# Patient Record
Sex: FEMALE | Race: WHITE | NOT HISPANIC OR LATINO | Employment: OTHER | URBAN - METROPOLITAN AREA
[De-identification: names, ages, dates, MRNs, and addresses within clinical notes are randomized per-mention and may not be internally consistent; named-entity substitution may affect disease eponyms.]

---

## 2017-01-03 ENCOUNTER — HOSPITAL ENCOUNTER (OUTPATIENT)
Dept: MAMMOGRAPHY | Facility: HOSPITAL | Age: 57
Discharge: HOME | End: 2017-01-03
Attending: INTERNAL MEDICINE

## 2017-01-03 DIAGNOSIS — Z12.31 OTHER SCREENING MAMMOGRAM: ICD-10-CM

## 2017-01-09 ENCOUNTER — COMMUNICATION - HEALTHEAST (OUTPATIENT)
Dept: INTERNAL MEDICINE | Facility: CLINIC | Age: 57
End: 2017-01-09

## 2017-01-19 ENCOUNTER — OFFICE VISIT - HEALTHEAST (OUTPATIENT)
Dept: BEHAVIORAL HEALTH | Facility: CLINIC | Age: 57
End: 2017-01-19

## 2017-01-19 DIAGNOSIS — F41.1 GENERALIZED ANXIETY DISORDER: ICD-10-CM

## 2017-01-19 DIAGNOSIS — F60.3 BORDERLINE PERSONALITY DISORDER (H): ICD-10-CM

## 2017-01-19 DIAGNOSIS — F33.1 DEPRESSION, MAJOR, RECURRENT, MODERATE (H): ICD-10-CM

## 2017-01-19 DIAGNOSIS — F43.12 CHRONIC POST-TRAUMATIC STRESS DISORDER (PTSD): ICD-10-CM

## 2017-01-23 ENCOUNTER — COMMUNICATION - HEALTHEAST (OUTPATIENT)
Dept: INTERNAL MEDICINE | Facility: CLINIC | Age: 57
End: 2017-01-23

## 2017-02-13 ENCOUNTER — COMMUNICATION - HEALTHEAST (OUTPATIENT)
Dept: INTERNAL MEDICINE | Facility: CLINIC | Age: 57
End: 2017-02-13

## 2017-02-13 DIAGNOSIS — S32.000A LUMBAR COMPRESSION FRACTURE, CLOSED, INITIAL ENCOUNTER (H): ICD-10-CM

## 2017-02-20 ENCOUNTER — COMMUNICATION - HEALTHEAST (OUTPATIENT)
Dept: INTERNAL MEDICINE | Facility: CLINIC | Age: 57
End: 2017-02-20

## 2017-02-23 ENCOUNTER — OFFICE VISIT - HEALTHEAST (OUTPATIENT)
Dept: BEHAVIORAL HEALTH | Facility: CLINIC | Age: 57
End: 2017-02-23

## 2017-02-23 DIAGNOSIS — F41.1 GENERALIZED ANXIETY DISORDER: ICD-10-CM

## 2017-02-23 DIAGNOSIS — F43.12 CHRONIC POST-TRAUMATIC STRESS DISORDER (PTSD): ICD-10-CM

## 2017-02-23 DIAGNOSIS — F60.3 BORDERLINE PERSONALITY DISORDER (H): ICD-10-CM

## 2017-02-23 DIAGNOSIS — F33.1 DEPRESSION, MAJOR, RECURRENT, MODERATE (H): ICD-10-CM

## 2017-02-27 ENCOUNTER — COMMUNICATION - HEALTHEAST (OUTPATIENT)
Dept: INTERNAL MEDICINE | Facility: CLINIC | Age: 57
End: 2017-02-27

## 2017-02-28 ENCOUNTER — COMMUNICATION - HEALTHEAST (OUTPATIENT)
Dept: BEHAVIORAL HEALTH | Facility: CLINIC | Age: 57
End: 2017-02-28

## 2017-02-28 ENCOUNTER — OFFICE VISIT - HEALTHEAST (OUTPATIENT)
Dept: BEHAVIORAL HEALTH | Facility: CLINIC | Age: 57
End: 2017-02-28

## 2017-02-28 DIAGNOSIS — F60.3 BORDERLINE PERSONALITY DISORDER (H): ICD-10-CM

## 2017-02-28 DIAGNOSIS — F41.9 ANXIETY DISORDER, UNSPECIFIED TYPE: ICD-10-CM

## 2017-02-28 ASSESSMENT — MIFFLIN-ST. JEOR: SCORE: 1952.08

## 2017-03-01 ENCOUNTER — OFFICE VISIT - HEALTHEAST (OUTPATIENT)
Dept: INTERNAL MEDICINE | Facility: CLINIC | Age: 57
End: 2017-03-01

## 2017-03-01 DIAGNOSIS — E78.2 MIXED HYPERLIPIDEMIA: ICD-10-CM

## 2017-03-01 DIAGNOSIS — E03.4 HYPOTHYROIDISM DUE TO ACQUIRED ATROPHY OF THYROID: ICD-10-CM

## 2017-03-01 DIAGNOSIS — N18.3 CHRONIC KIDNEY DISEASE (CKD), STAGE 3 (MODERATE): ICD-10-CM

## 2017-03-01 DIAGNOSIS — F41.9 ANXIETY DISORDER, UNSPECIFIED TYPE: ICD-10-CM

## 2017-03-01 DIAGNOSIS — G40.909 NONINTRACTABLE EPILEPSY WITHOUT STATUS EPILEPTICUS, UNSPECIFIED EPILEPSY TYPE (H): ICD-10-CM

## 2017-03-01 DIAGNOSIS — I10 ESSENTIAL HYPERTENSION: ICD-10-CM

## 2017-03-01 DIAGNOSIS — F17.218 NICOTINE DEPENDENCE, CIGARETTES, WITH OTHER NICOTINE-INDUCED DISORDERS: ICD-10-CM

## 2017-03-01 LAB
CHOLEST SERPL-MCNC: 209 MG/DL
FASTING STATUS PATIENT QL REPORTED: ABNORMAL
HDLC SERPL-MCNC: 76 MG/DL
LDLC SERPL CALC-MCNC: 113 MG/DL
TRIGL SERPL-MCNC: 98 MG/DL

## 2017-03-01 ASSESSMENT — MIFFLIN-ST. JEOR: SCORE: 1956.61

## 2017-03-13 ENCOUNTER — COMMUNICATION - HEALTHEAST (OUTPATIENT)
Dept: INTERNAL MEDICINE | Facility: CLINIC | Age: 57
End: 2017-03-13

## 2017-03-27 ENCOUNTER — COMMUNICATION - HEALTHEAST (OUTPATIENT)
Dept: INTERNAL MEDICINE | Facility: CLINIC | Age: 57
End: 2017-03-27

## 2017-03-30 ENCOUNTER — OFFICE VISIT - HEALTHEAST (OUTPATIENT)
Dept: BEHAVIORAL HEALTH | Facility: CLINIC | Age: 57
End: 2017-03-30

## 2017-03-30 ENCOUNTER — AMBULATORY - HEALTHEAST (OUTPATIENT)
Dept: BEHAVIORAL HEALTH | Facility: CLINIC | Age: 57
End: 2017-03-30

## 2017-03-30 DIAGNOSIS — F60.3 BORDERLINE PERSONALITY DISORDER (H): ICD-10-CM

## 2017-03-30 DIAGNOSIS — F33.1 DEPRESSION, MAJOR, RECURRENT, MODERATE (H): ICD-10-CM

## 2017-03-30 DIAGNOSIS — F43.12 CHRONIC POST-TRAUMATIC STRESS DISORDER (PTSD): ICD-10-CM

## 2017-03-30 DIAGNOSIS — F41.1 GENERALIZED ANXIETY DISORDER: ICD-10-CM

## 2017-04-04 ENCOUNTER — HOME CARE/HOSPICE - HEALTHEAST (OUTPATIENT)
Dept: HOME HEALTH SERVICES | Facility: HOME HEALTH | Age: 57
End: 2017-04-04

## 2017-04-04 ENCOUNTER — COMMUNICATION - HEALTHEAST (OUTPATIENT)
Dept: INTERNAL MEDICINE | Facility: CLINIC | Age: 57
End: 2017-04-04

## 2017-04-04 ENCOUNTER — COMMUNICATION - HEALTHEAST (OUTPATIENT)
Dept: NURSING | Facility: CLINIC | Age: 57
End: 2017-04-04

## 2017-04-04 ENCOUNTER — OFFICE VISIT - HEALTHEAST (OUTPATIENT)
Dept: INTERNAL MEDICINE | Facility: CLINIC | Age: 57
End: 2017-04-04

## 2017-04-04 DIAGNOSIS — I26.99 PULMONARY EMBOLISM WITH INFARCTION (H): ICD-10-CM

## 2017-04-04 DIAGNOSIS — I10 ESSENTIAL HYPERTENSION WITH GOAL BLOOD PRESSURE LESS THAN 140/90: ICD-10-CM

## 2017-04-04 DIAGNOSIS — F17.218 NICOTINE DEPENDENCE, CIGARETTES, WITH OTHER NICOTINE-INDUCED DISORDERS: ICD-10-CM

## 2017-04-04 DIAGNOSIS — N18.3 CHRONIC KIDNEY DISEASE (CKD), STAGE 3 (MODERATE): ICD-10-CM

## 2017-04-04 DIAGNOSIS — H61.23 BILATERAL IMPACTED CERUMEN: ICD-10-CM

## 2017-04-04 ASSESSMENT — MIFFLIN-ST. JEOR: SCORE: 1992.9

## 2017-04-05 ENCOUNTER — COMMUNICATION - HEALTHEAST (OUTPATIENT)
Dept: INTERNAL MEDICINE | Facility: CLINIC | Age: 57
End: 2017-04-05

## 2017-04-05 ENCOUNTER — COMMUNICATION - HEALTHEAST (OUTPATIENT)
Dept: HOME HEALTH SERVICES | Facility: HOME HEALTH | Age: 57
End: 2017-04-05

## 2017-04-06 ENCOUNTER — COMMUNICATION - HEALTHEAST (OUTPATIENT)
Dept: INTERNAL MEDICINE | Facility: CLINIC | Age: 57
End: 2017-04-06

## 2017-04-06 DIAGNOSIS — I26.99 PULMONARY EMBOLISM WITH INFARCTION (H): ICD-10-CM

## 2017-04-10 ENCOUNTER — COMMUNICATION - HEALTHEAST (OUTPATIENT)
Dept: INTERNAL MEDICINE | Facility: CLINIC | Age: 57
End: 2017-04-10

## 2017-04-13 ENCOUNTER — COMMUNICATION - HEALTHEAST (OUTPATIENT)
Dept: NURSING | Facility: CLINIC | Age: 57
End: 2017-04-13

## 2017-04-13 ENCOUNTER — AMBULATORY - HEALTHEAST (OUTPATIENT)
Dept: LAB | Facility: CLINIC | Age: 57
End: 2017-04-13

## 2017-04-13 DIAGNOSIS — I26.99 PULMONARY EMBOLISM WITH INFARCTION (H): ICD-10-CM

## 2017-04-20 ENCOUNTER — COMMUNICATION - HEALTHEAST (OUTPATIENT)
Dept: NURSING | Facility: CLINIC | Age: 57
End: 2017-04-20

## 2017-04-20 ENCOUNTER — AMBULATORY - HEALTHEAST (OUTPATIENT)
Dept: LAB | Facility: CLINIC | Age: 57
End: 2017-04-20

## 2017-04-20 DIAGNOSIS — I26.99 PULMONARY EMBOLISM WITH INFARCTION (H): ICD-10-CM

## 2017-04-24 ENCOUNTER — COMMUNICATION - HEALTHEAST (OUTPATIENT)
Dept: NURSING | Facility: CLINIC | Age: 57
End: 2017-04-24

## 2017-04-24 ENCOUNTER — AMBULATORY - HEALTHEAST (OUTPATIENT)
Dept: LAB | Facility: CLINIC | Age: 57
End: 2017-04-24

## 2017-04-24 DIAGNOSIS — I26.99 PULMONARY EMBOLISM WITH INFARCTION (H): ICD-10-CM

## 2017-04-27 ENCOUNTER — OFFICE VISIT - HEALTHEAST (OUTPATIENT)
Dept: INTERNAL MEDICINE | Facility: CLINIC | Age: 57
End: 2017-04-27

## 2017-04-27 ENCOUNTER — COMMUNICATION - HEALTHEAST (OUTPATIENT)
Dept: NURSING | Facility: CLINIC | Age: 57
End: 2017-04-27

## 2017-04-27 DIAGNOSIS — I26.99 PULMONARY EMBOLISM WITH INFARCTION (H): ICD-10-CM

## 2017-04-27 DIAGNOSIS — G89.29 CHRONIC NONINTRACTABLE HEADACHE, UNSPECIFIED HEADACHE TYPE: ICD-10-CM

## 2017-04-27 DIAGNOSIS — E66.09 NON MORBID OBESITY DUE TO EXCESS CALORIES: ICD-10-CM

## 2017-04-27 DIAGNOSIS — R51.9 CHRONIC NONINTRACTABLE HEADACHE, UNSPECIFIED HEADACHE TYPE: ICD-10-CM

## 2017-04-27 DIAGNOSIS — F41.9 ANXIETY DISORDER, UNSPECIFIED TYPE: ICD-10-CM

## 2017-04-27 ASSESSMENT — MIFFLIN-ST. JEOR: SCORE: 1983.83

## 2017-05-01 ENCOUNTER — COMMUNICATION - HEALTHEAST (OUTPATIENT)
Dept: INTERNAL MEDICINE | Facility: CLINIC | Age: 57
End: 2017-05-01

## 2017-05-02 ENCOUNTER — AMBULATORY - HEALTHEAST (OUTPATIENT)
Dept: LAB | Facility: CLINIC | Age: 57
End: 2017-05-02

## 2017-05-02 ENCOUNTER — COMMUNICATION - HEALTHEAST (OUTPATIENT)
Dept: NURSING | Facility: CLINIC | Age: 57
End: 2017-05-02

## 2017-05-02 DIAGNOSIS — I26.99 PULMONARY EMBOLISM WITH INFARCTION (H): ICD-10-CM

## 2017-05-05 ENCOUNTER — AMBULATORY - HEALTHEAST (OUTPATIENT)
Dept: LAB | Facility: CLINIC | Age: 57
End: 2017-05-05

## 2017-05-05 ENCOUNTER — COMMUNICATION - HEALTHEAST (OUTPATIENT)
Dept: NURSING | Facility: CLINIC | Age: 57
End: 2017-05-05

## 2017-05-05 ENCOUNTER — COMMUNICATION - HEALTHEAST (OUTPATIENT)
Dept: INTERNAL MEDICINE | Facility: CLINIC | Age: 57
End: 2017-05-05

## 2017-05-05 DIAGNOSIS — I26.99 PULMONARY EMBOLISM WITH INFARCTION (H): ICD-10-CM

## 2017-05-08 ENCOUNTER — AMBULATORY - HEALTHEAST (OUTPATIENT)
Dept: NURSING | Facility: CLINIC | Age: 57
End: 2017-05-08

## 2017-05-08 ENCOUNTER — COMMUNICATION - HEALTHEAST (OUTPATIENT)
Dept: INTERNAL MEDICINE | Facility: CLINIC | Age: 57
End: 2017-05-08

## 2017-05-08 DIAGNOSIS — Z79.01 LONG TERM (CURRENT) USE OF ANTICOAGULANTS: ICD-10-CM

## 2017-05-08 DIAGNOSIS — I26.99 PULMONARY EMBOLISM WITH INFARCTION (H): ICD-10-CM

## 2017-05-10 ENCOUNTER — RECORDS - HEALTHEAST (OUTPATIENT)
Dept: ADMINISTRATIVE | Facility: OTHER | Age: 57
End: 2017-05-10

## 2017-05-11 ENCOUNTER — OFFICE VISIT - HEALTHEAST (OUTPATIENT)
Dept: BEHAVIORAL HEALTH | Facility: CLINIC | Age: 57
End: 2017-05-11

## 2017-05-11 DIAGNOSIS — F33.1 DEPRESSION, MAJOR, RECURRENT, MODERATE (H): ICD-10-CM

## 2017-05-11 DIAGNOSIS — F43.12 CHRONIC POST-TRAUMATIC STRESS DISORDER (PTSD): ICD-10-CM

## 2017-05-11 DIAGNOSIS — F41.1 GENERALIZED ANXIETY DISORDER: ICD-10-CM

## 2017-05-11 DIAGNOSIS — F60.3 BORDERLINE PERSONALITY DISORDER (H): ICD-10-CM

## 2017-05-12 ENCOUNTER — COMMUNICATION - HEALTHEAST (OUTPATIENT)
Dept: NURSING | Facility: CLINIC | Age: 57
End: 2017-05-12

## 2017-05-12 ENCOUNTER — AMBULATORY - HEALTHEAST (OUTPATIENT)
Dept: LAB | Facility: CLINIC | Age: 57
End: 2017-05-12

## 2017-05-12 DIAGNOSIS — I26.99 PULMONARY EMBOLISM WITH INFARCTION (H): ICD-10-CM

## 2017-05-15 ENCOUNTER — COMMUNICATION - HEALTHEAST (OUTPATIENT)
Dept: INTERNAL MEDICINE | Facility: CLINIC | Age: 57
End: 2017-05-15

## 2017-05-19 ENCOUNTER — COMMUNICATION - HEALTHEAST (OUTPATIENT)
Dept: NURSING | Facility: CLINIC | Age: 57
End: 2017-05-19

## 2017-05-19 ENCOUNTER — AMBULATORY - HEALTHEAST (OUTPATIENT)
Dept: LAB | Facility: CLINIC | Age: 57
End: 2017-05-19

## 2017-05-19 DIAGNOSIS — I26.99 PULMONARY EMBOLISM WITH INFARCTION (H): ICD-10-CM

## 2017-05-25 ENCOUNTER — AMBULATORY - HEALTHEAST (OUTPATIENT)
Dept: BEHAVIORAL HEALTH | Facility: CLINIC | Age: 57
End: 2017-05-25

## 2017-05-25 ENCOUNTER — OFFICE VISIT - HEALTHEAST (OUTPATIENT)
Dept: BEHAVIORAL HEALTH | Facility: CLINIC | Age: 57
End: 2017-05-25

## 2017-05-25 DIAGNOSIS — F33.1 DEPRESSION, MAJOR, RECURRENT, MODERATE (H): ICD-10-CM

## 2017-05-25 DIAGNOSIS — F41.1 GENERALIZED ANXIETY DISORDER: ICD-10-CM

## 2017-05-25 DIAGNOSIS — F60.3 BORDERLINE PERSONALITY DISORDER (H): ICD-10-CM

## 2017-05-25 DIAGNOSIS — F43.12 CHRONIC POST-TRAUMATIC STRESS DISORDER (PTSD): ICD-10-CM

## 2017-05-26 ENCOUNTER — COMMUNICATION - HEALTHEAST (OUTPATIENT)
Dept: NURSING | Facility: CLINIC | Age: 57
End: 2017-05-26

## 2017-05-26 ENCOUNTER — AMBULATORY - HEALTHEAST (OUTPATIENT)
Dept: LAB | Facility: CLINIC | Age: 57
End: 2017-05-26

## 2017-05-26 ENCOUNTER — COMMUNICATION - HEALTHEAST (OUTPATIENT)
Dept: SCHEDULING | Facility: CLINIC | Age: 57
End: 2017-05-26

## 2017-05-26 DIAGNOSIS — I26.99 PULMONARY EMBOLISM WITH INFARCTION (H): ICD-10-CM

## 2017-05-30 ENCOUNTER — COMMUNICATION - HEALTHEAST (OUTPATIENT)
Dept: BEHAVIORAL HEALTH | Facility: CLINIC | Age: 57
End: 2017-05-30

## 2017-06-02 ENCOUNTER — COMMUNICATION - HEALTHEAST (OUTPATIENT)
Dept: NURSING | Facility: CLINIC | Age: 57
End: 2017-06-02

## 2017-06-02 ENCOUNTER — AMBULATORY - HEALTHEAST (OUTPATIENT)
Dept: LAB | Facility: HOSPITAL | Age: 57
End: 2017-06-02

## 2017-06-02 ENCOUNTER — COMMUNICATION - HEALTHEAST (OUTPATIENT)
Dept: INTERNAL MEDICINE | Facility: CLINIC | Age: 57
End: 2017-06-02

## 2017-06-02 DIAGNOSIS — I26.99 PULMONARY EMBOLISM WITH INFARCTION (H): ICD-10-CM

## 2017-06-05 ENCOUNTER — COMMUNICATION - HEALTHEAST (OUTPATIENT)
Dept: INTERNAL MEDICINE | Facility: CLINIC | Age: 57
End: 2017-06-05

## 2017-06-12 ENCOUNTER — AMBULATORY - HEALTHEAST (OUTPATIENT)
Dept: LAB | Facility: CLINIC | Age: 57
End: 2017-06-12

## 2017-06-12 ENCOUNTER — COMMUNICATION - HEALTHEAST (OUTPATIENT)
Dept: NURSING | Facility: CLINIC | Age: 57
End: 2017-06-12

## 2017-06-12 ENCOUNTER — COMMUNICATION - HEALTHEAST (OUTPATIENT)
Dept: INTERNAL MEDICINE | Facility: CLINIC | Age: 57
End: 2017-06-12

## 2017-06-12 DIAGNOSIS — I26.99 PULMONARY EMBOLISM WITH INFARCTION (H): ICD-10-CM

## 2017-06-26 ENCOUNTER — COMMUNICATION - HEALTHEAST (OUTPATIENT)
Dept: INTERNAL MEDICINE | Facility: CLINIC | Age: 57
End: 2017-06-26

## 2017-06-26 ENCOUNTER — AMBULATORY - HEALTHEAST (OUTPATIENT)
Dept: LAB | Facility: CLINIC | Age: 57
End: 2017-06-26

## 2017-06-26 DIAGNOSIS — I26.99 PULMONARY EMBOLISM WITH INFARCTION (H): ICD-10-CM

## 2017-06-27 ENCOUNTER — COMMUNICATION - HEALTHEAST (OUTPATIENT)
Dept: INTERNAL MEDICINE | Facility: CLINIC | Age: 57
End: 2017-06-27

## 2017-07-10 ENCOUNTER — COMMUNICATION - HEALTHEAST (OUTPATIENT)
Dept: INTERNAL MEDICINE | Facility: CLINIC | Age: 57
End: 2017-07-10

## 2017-07-10 ENCOUNTER — COMMUNICATION - HEALTHEAST (OUTPATIENT)
Dept: NURSING | Facility: CLINIC | Age: 57
End: 2017-07-10

## 2017-07-10 ENCOUNTER — AMBULATORY - HEALTHEAST (OUTPATIENT)
Dept: LAB | Facility: CLINIC | Age: 57
End: 2017-07-10

## 2017-07-10 DIAGNOSIS — I26.99 PULMONARY EMBOLISM WITH INFARCTION (H): ICD-10-CM

## 2017-07-18 ENCOUNTER — OFFICE VISIT - HEALTHEAST (OUTPATIENT)
Dept: BEHAVIORAL HEALTH | Facility: CLINIC | Age: 57
End: 2017-07-18

## 2017-07-18 DIAGNOSIS — F17.218 NICOTINE DEPENDENCE, CIGARETTES, WITH OTHER NICOTINE-INDUCED DISORDERS: ICD-10-CM

## 2017-07-18 DIAGNOSIS — F32.9 MAJOR DEPRESSIVE DISORDER WITHOUT PSYCHOTIC FEATURES: ICD-10-CM

## 2017-07-18 DIAGNOSIS — F60.3 BORDERLINE PERSONALITY DISORDER (H): ICD-10-CM

## 2017-07-18 ASSESSMENT — MIFFLIN-ST. JEOR: SCORE: 1965.68

## 2017-07-25 ENCOUNTER — AMBULATORY - HEALTHEAST (OUTPATIENT)
Dept: BEHAVIORAL HEALTH | Facility: CLINIC | Age: 57
End: 2017-07-25

## 2017-07-27 ENCOUNTER — OFFICE VISIT - HEALTHEAST (OUTPATIENT)
Dept: BEHAVIORAL HEALTH | Facility: CLINIC | Age: 57
End: 2017-07-27

## 2017-07-27 DIAGNOSIS — F33.1 DEPRESSION, MAJOR, RECURRENT, MODERATE (H): ICD-10-CM

## 2017-07-27 DIAGNOSIS — F60.3 BORDERLINE PERSONALITY DISORDER (H): ICD-10-CM

## 2017-07-27 DIAGNOSIS — F41.9 ANXIETY DISORDER, UNSPECIFIED TYPE: ICD-10-CM

## 2017-07-27 DIAGNOSIS — F41.1 GENERALIZED ANXIETY DISORDER: ICD-10-CM

## 2017-08-02 ENCOUNTER — COMMUNICATION - HEALTHEAST (OUTPATIENT)
Dept: NURSING | Facility: CLINIC | Age: 57
End: 2017-08-02

## 2017-08-10 ENCOUNTER — OFFICE VISIT - HEALTHEAST (OUTPATIENT)
Dept: BEHAVIORAL HEALTH | Facility: CLINIC | Age: 57
End: 2017-08-10

## 2017-08-10 ENCOUNTER — COMMUNICATION - HEALTHEAST (OUTPATIENT)
Dept: INTERNAL MEDICINE | Facility: CLINIC | Age: 57
End: 2017-08-10

## 2017-08-10 DIAGNOSIS — F60.3 BORDERLINE PERSONALITY DISORDER (H): ICD-10-CM

## 2017-08-10 DIAGNOSIS — F33.1 DEPRESSION, MAJOR, RECURRENT, MODERATE (H): ICD-10-CM

## 2017-08-10 DIAGNOSIS — F41.1 GENERALIZED ANXIETY DISORDER: ICD-10-CM

## 2017-08-11 ENCOUNTER — AMBULATORY - HEALTHEAST (OUTPATIENT)
Dept: LAB | Facility: CLINIC | Age: 57
End: 2017-08-11

## 2017-08-11 ENCOUNTER — COMMUNICATION - HEALTHEAST (OUTPATIENT)
Dept: NURSING | Facility: CLINIC | Age: 57
End: 2017-08-11

## 2017-08-11 DIAGNOSIS — I26.99 PULMONARY EMBOLISM WITH INFARCTION (H): ICD-10-CM

## 2017-08-18 ENCOUNTER — COMMUNICATION - HEALTHEAST (OUTPATIENT)
Dept: BEHAVIORAL HEALTH | Facility: CLINIC | Age: 57
End: 2017-08-18

## 2017-08-22 ENCOUNTER — COMMUNICATION - HEALTHEAST (OUTPATIENT)
Dept: INTERNAL MEDICINE | Facility: CLINIC | Age: 57
End: 2017-08-22

## 2017-08-24 ENCOUNTER — RECORDS - HEALTHEAST (OUTPATIENT)
Dept: ADMINISTRATIVE | Facility: OTHER | Age: 57
End: 2017-08-24

## 2017-08-28 ENCOUNTER — COMMUNICATION - HEALTHEAST (OUTPATIENT)
Dept: BEHAVIORAL HEALTH | Facility: CLINIC | Age: 57
End: 2017-08-28

## 2017-08-30 ENCOUNTER — AMBULATORY - HEALTHEAST (OUTPATIENT)
Dept: BEHAVIORAL HEALTH | Facility: CLINIC | Age: 57
End: 2017-08-30

## 2017-08-31 ENCOUNTER — OFFICE VISIT - HEALTHEAST (OUTPATIENT)
Dept: BEHAVIORAL HEALTH | Facility: CLINIC | Age: 57
End: 2017-08-31

## 2017-08-31 ENCOUNTER — AMBULATORY - HEALTHEAST (OUTPATIENT)
Dept: BEHAVIORAL HEALTH | Facility: CLINIC | Age: 57
End: 2017-08-31

## 2017-08-31 DIAGNOSIS — F41.1 GENERALIZED ANXIETY DISORDER: ICD-10-CM

## 2017-08-31 DIAGNOSIS — F60.3 BORDERLINE PERSONALITY DISORDER (H): ICD-10-CM

## 2017-08-31 DIAGNOSIS — F33.1 DEPRESSION, MAJOR, RECURRENT, MODERATE (H): ICD-10-CM

## 2017-09-06 ENCOUNTER — COMMUNICATION - HEALTHEAST (OUTPATIENT)
Dept: BEHAVIORAL HEALTH | Facility: CLINIC | Age: 57
End: 2017-09-06

## 2017-09-06 ENCOUNTER — AMBULATORY - HEALTHEAST (OUTPATIENT)
Dept: LAB | Facility: CLINIC | Age: 57
End: 2017-09-06

## 2017-09-06 ENCOUNTER — COMMUNICATION - HEALTHEAST (OUTPATIENT)
Dept: NURSING | Facility: CLINIC | Age: 57
End: 2017-09-06

## 2017-09-06 ENCOUNTER — COMMUNICATION - HEALTHEAST (OUTPATIENT)
Dept: INTERNAL MEDICINE | Facility: CLINIC | Age: 57
End: 2017-09-06

## 2017-09-06 DIAGNOSIS — F17.218 NICOTINE DEPENDENCE, CIGARETTES, WITH OTHER NICOTINE-INDUCED DISORDERS: ICD-10-CM

## 2017-09-06 DIAGNOSIS — F32.9 MAJOR DEPRESSIVE DISORDER WITHOUT PSYCHOTIC FEATURES: ICD-10-CM

## 2017-09-06 DIAGNOSIS — I26.99 PULMONARY EMBOLISM WITH INFARCTION (H): ICD-10-CM

## 2017-09-06 DIAGNOSIS — F60.3 BORDERLINE PERSONALITY DISORDER (H): ICD-10-CM

## 2017-09-13 ENCOUNTER — COMMUNICATION - HEALTHEAST (OUTPATIENT)
Dept: INTERNAL MEDICINE | Facility: CLINIC | Age: 57
End: 2017-09-13

## 2017-09-14 ENCOUNTER — OFFICE VISIT - HEALTHEAST (OUTPATIENT)
Dept: BEHAVIORAL HEALTH | Facility: CLINIC | Age: 57
End: 2017-09-14

## 2017-09-14 DIAGNOSIS — F33.1 DEPRESSION, MAJOR, RECURRENT, MODERATE (H): ICD-10-CM

## 2017-09-14 DIAGNOSIS — F60.3 BORDERLINE PERSONALITY DISORDER (H): ICD-10-CM

## 2017-09-14 DIAGNOSIS — F41.1 GENERALIZED ANXIETY DISORDER: ICD-10-CM

## 2017-09-15 ENCOUNTER — COMMUNICATION - HEALTHEAST (OUTPATIENT)
Dept: BEHAVIORAL HEALTH | Facility: CLINIC | Age: 57
End: 2017-09-15

## 2017-09-28 ENCOUNTER — OFFICE VISIT - HEALTHEAST (OUTPATIENT)
Dept: BEHAVIORAL HEALTH | Facility: CLINIC | Age: 57
End: 2017-09-28

## 2017-09-28 DIAGNOSIS — F43.12 CHRONIC POST-TRAUMATIC STRESS DISORDER (PTSD): ICD-10-CM

## 2017-09-28 DIAGNOSIS — F60.3 BORDERLINE PERSONALITY DISORDER (H): ICD-10-CM

## 2017-09-28 DIAGNOSIS — F41.1 GENERALIZED ANXIETY DISORDER: ICD-10-CM

## 2017-09-28 DIAGNOSIS — F33.1 DEPRESSION, MAJOR, RECURRENT, MODERATE (H): ICD-10-CM

## 2017-10-02 ENCOUNTER — COMMUNICATION - HEALTHEAST (OUTPATIENT)
Dept: NURSING | Facility: CLINIC | Age: 57
End: 2017-10-02

## 2017-10-02 DIAGNOSIS — I26.99 PULMONARY EMBOLISM WITH INFARCTION (H): ICD-10-CM

## 2017-10-03 ENCOUNTER — OFFICE VISIT - HEALTHEAST (OUTPATIENT)
Dept: BEHAVIORAL HEALTH | Facility: CLINIC | Age: 57
End: 2017-10-03

## 2017-10-03 DIAGNOSIS — F32.9 MAJOR DEPRESSIVE DISORDER WITHOUT PSYCHOTIC FEATURES: ICD-10-CM

## 2017-10-03 DIAGNOSIS — F17.218 NICOTINE DEPENDENCE, CIGARETTES, WITH OTHER NICOTINE-INDUCED DISORDERS: ICD-10-CM

## 2017-10-03 DIAGNOSIS — F60.3 BORDERLINE PERSONALITY DISORDER (H): ICD-10-CM

## 2017-10-03 DIAGNOSIS — F33.2 SEVERE EPISODE OF RECURRENT MAJOR DEPRESSIVE DISORDER, WITHOUT PSYCHOTIC FEATURES (H): ICD-10-CM

## 2017-10-03 ASSESSMENT — MIFFLIN-ST. JEOR: SCORE: 1961.15

## 2017-10-04 ENCOUNTER — COMMUNICATION - HEALTHEAST (OUTPATIENT)
Dept: INTERNAL MEDICINE | Facility: CLINIC | Age: 57
End: 2017-10-04

## 2017-10-04 ENCOUNTER — COMMUNICATION - HEALTHEAST (OUTPATIENT)
Dept: BEHAVIORAL HEALTH | Facility: CLINIC | Age: 57
End: 2017-10-04

## 2017-10-04 DIAGNOSIS — F17.218 NICOTINE DEPENDENCE, CIGARETTES, WITH OTHER NICOTINE-INDUCED DISORDERS: ICD-10-CM

## 2017-10-04 DIAGNOSIS — E55.9 VITAMIN D DEFICIENCY: ICD-10-CM

## 2017-10-04 DIAGNOSIS — E03.9 HYPOTHYROIDISM: ICD-10-CM

## 2017-10-04 DIAGNOSIS — F32.9 MAJOR DEPRESSIVE DISORDER WITHOUT PSYCHOTIC FEATURES: ICD-10-CM

## 2017-10-04 DIAGNOSIS — F60.3 BORDERLINE PERSONALITY DISORDER (H): ICD-10-CM

## 2017-10-04 DIAGNOSIS — I10 HTN (HYPERTENSION): ICD-10-CM

## 2017-10-05 ENCOUNTER — COMMUNICATION - HEALTHEAST (OUTPATIENT)
Dept: BEHAVIORAL HEALTH | Facility: CLINIC | Age: 57
End: 2017-10-05

## 2017-10-05 ENCOUNTER — COMMUNICATION - HEALTHEAST (OUTPATIENT)
Dept: NURSING | Facility: CLINIC | Age: 57
End: 2017-10-05

## 2017-10-13 ENCOUNTER — COMMUNICATION - HEALTHEAST (OUTPATIENT)
Dept: INTERNAL MEDICINE | Facility: CLINIC | Age: 57
End: 2017-10-13

## 2017-10-13 DIAGNOSIS — I26.99 PULMONARY EMBOLISM WITH INFARCTION (H): ICD-10-CM

## 2017-10-17 ENCOUNTER — COMMUNICATION - HEALTHEAST (OUTPATIENT)
Dept: BEHAVIORAL HEALTH | Facility: CLINIC | Age: 57
End: 2017-10-17

## 2017-10-30 ENCOUNTER — RECORDS - HEALTHEAST (OUTPATIENT)
Dept: ADMINISTRATIVE | Facility: OTHER | Age: 57
End: 2017-10-30

## 2017-10-30 ENCOUNTER — COMMUNICATION - HEALTHEAST (OUTPATIENT)
Dept: NURSING | Facility: CLINIC | Age: 57
End: 2017-10-30

## 2017-11-08 ENCOUNTER — COMMUNICATION - HEALTHEAST (OUTPATIENT)
Dept: INTERNAL MEDICINE | Facility: CLINIC | Age: 57
End: 2017-11-08

## 2017-11-09 ENCOUNTER — OFFICE VISIT - HEALTHEAST (OUTPATIENT)
Dept: BEHAVIORAL HEALTH | Facility: CLINIC | Age: 57
End: 2017-11-09

## 2017-11-09 DIAGNOSIS — F41.1 GENERALIZED ANXIETY DISORDER: ICD-10-CM

## 2017-11-09 DIAGNOSIS — F60.3 BORDERLINE PERSONALITY DISORDER (H): ICD-10-CM

## 2017-11-09 DIAGNOSIS — F33.1 DEPRESSION, MAJOR, RECURRENT, MODERATE (H): ICD-10-CM

## 2017-11-09 DIAGNOSIS — F43.12 CHRONIC POST-TRAUMATIC STRESS DISORDER (PTSD): ICD-10-CM

## 2017-11-22 ENCOUNTER — OFFICE VISIT - HEALTHEAST (OUTPATIENT)
Dept: BEHAVIORAL HEALTH | Facility: CLINIC | Age: 57
End: 2017-11-22

## 2017-11-22 DIAGNOSIS — F60.3 BORDERLINE PERSONALITY DISORDER (H): ICD-10-CM

## 2017-11-22 DIAGNOSIS — F33.1 DEPRESSION, MAJOR, RECURRENT, MODERATE (H): ICD-10-CM

## 2017-11-22 DIAGNOSIS — F41.1 GENERALIZED ANXIETY DISORDER: ICD-10-CM

## 2017-11-29 ENCOUNTER — AMBULATORY - HEALTHEAST (OUTPATIENT)
Dept: LAB | Facility: CLINIC | Age: 57
End: 2017-11-29

## 2017-11-29 ENCOUNTER — COMMUNICATION - HEALTHEAST (OUTPATIENT)
Dept: NURSING | Facility: CLINIC | Age: 57
End: 2017-11-29

## 2017-11-29 DIAGNOSIS — I26.99 PULMONARY EMBOLISM WITH INFARCTION (H): ICD-10-CM

## 2017-12-05 ENCOUNTER — COMMUNICATION - HEALTHEAST (OUTPATIENT)
Dept: BEHAVIORAL HEALTH | Facility: CLINIC | Age: 57
End: 2017-12-05

## 2017-12-07 ENCOUNTER — COMMUNICATION - HEALTHEAST (OUTPATIENT)
Dept: NURSING | Facility: CLINIC | Age: 57
End: 2017-12-07

## 2017-12-07 DIAGNOSIS — I26.99 PULMONARY EMBOLISM WITH INFARCTION (H): ICD-10-CM

## 2017-12-18 ENCOUNTER — COMMUNICATION - HEALTHEAST (OUTPATIENT)
Dept: BEHAVIORAL HEALTH | Facility: CLINIC | Age: 57
End: 2017-12-18

## 2017-12-21 ENCOUNTER — AMBULATORY - HEALTHEAST (OUTPATIENT)
Dept: BEHAVIORAL HEALTH | Facility: CLINIC | Age: 57
End: 2017-12-21

## 2017-12-21 ENCOUNTER — OFFICE VISIT - HEALTHEAST (OUTPATIENT)
Dept: BEHAVIORAL HEALTH | Facility: CLINIC | Age: 57
End: 2017-12-21

## 2017-12-21 DIAGNOSIS — F41.1 GENERALIZED ANXIETY DISORDER: ICD-10-CM

## 2017-12-21 DIAGNOSIS — F60.3 BORDERLINE PERSONALITY DISORDER (H): ICD-10-CM

## 2017-12-21 DIAGNOSIS — F33.1 DEPRESSION, MAJOR, RECURRENT, MODERATE (H): ICD-10-CM

## 2017-12-21 DIAGNOSIS — F43.12 CHRONIC POST-TRAUMATIC STRESS DISORDER (PTSD): ICD-10-CM

## 2017-12-27 ENCOUNTER — COMMUNICATION - HEALTHEAST (OUTPATIENT)
Dept: INTERNAL MEDICINE | Facility: CLINIC | Age: 57
End: 2017-12-27

## 2017-12-27 DIAGNOSIS — E03.9 HYPOTHYROIDISM: ICD-10-CM

## 2018-01-02 ENCOUNTER — AMBULATORY - HEALTHEAST (OUTPATIENT)
Dept: LAB | Facility: CLINIC | Age: 58
End: 2018-01-02

## 2018-01-02 ENCOUNTER — COMMUNICATION - HEALTHEAST (OUTPATIENT)
Dept: INTERNAL MEDICINE | Facility: CLINIC | Age: 58
End: 2018-01-02

## 2018-01-02 ENCOUNTER — OFFICE VISIT - HEALTHEAST (OUTPATIENT)
Dept: BEHAVIORAL HEALTH | Facility: CLINIC | Age: 58
End: 2018-01-02

## 2018-01-02 DIAGNOSIS — M85.9 DISORDER OF BONE DENSITY AND STRUCTURE, UNSPECIFIED: ICD-10-CM

## 2018-01-02 DIAGNOSIS — E03.9 HYPOTHYROID: ICD-10-CM

## 2018-01-02 DIAGNOSIS — F60.3 BORDERLINE PERSONALITY DISORDER (H): ICD-10-CM

## 2018-01-02 DIAGNOSIS — I26.99 PULMONARY EMBOLISM WITH INFARCTION (H): ICD-10-CM

## 2018-01-02 LAB
ANION GAP SERPL CALCULATED.3IONS-SCNC: 7 MMOL/L (ref 5–18)
BASOPHILS # BLD AUTO: 0 THOU/UL (ref 0–0.2)
BASOPHILS NFR BLD AUTO: 1 % (ref 0–2)
BUN SERPL-MCNC: 22 MG/DL (ref 8–22)
CALCIUM SERPL-MCNC: 8.9 MG/DL (ref 8.5–10.5)
CHLORIDE BLD-SCNC: 105 MMOL/L (ref 98–107)
CO2 SERPL-SCNC: 24 MMOL/L (ref 22–31)
CREAT SERPL-MCNC: 0.9 MG/DL (ref 0.6–1.1)
EOSINOPHIL # BLD AUTO: 0.1 THOU/UL (ref 0–0.4)
EOSINOPHIL NFR BLD AUTO: 2 % (ref 0–6)
ERYTHROCYTE [DISTWIDTH] IN BLOOD BY AUTOMATED COUNT: 13.3 % (ref 11–14.5)
GFR SERPL CREATININE-BSD FRML MDRD: >60 ML/MIN/1.73M2
GLUCOSE BLD-MCNC: 254 MG/DL (ref 70–125)
HCT VFR BLD AUTO: 40.9 % (ref 35–47)
HGB BLD-MCNC: 13.8 G/DL (ref 12–16)
INR PPP: 2.83 (ref 0.9–1.1)
LYMPHOCYTES # BLD AUTO: 1.2 THOU/UL (ref 0.8–4.4)
LYMPHOCYTES NFR BLD AUTO: 30 % (ref 20–40)
MCH RBC QN AUTO: 30.7 PG (ref 27–34)
MCHC RBC AUTO-ENTMCNC: 33.7 G/DL (ref 32–36)
MCV RBC AUTO: 91 FL (ref 80–100)
MONOCYTES # BLD AUTO: 0.2 THOU/UL (ref 0–0.9)
MONOCYTES NFR BLD AUTO: 6 % (ref 2–10)
NEUTROPHILS # BLD AUTO: 2.4 THOU/UL (ref 2–7.7)
NEUTROPHILS NFR BLD AUTO: 61 % (ref 50–70)
PLATELET # BLD AUTO: 217 THOU/UL (ref 140–440)
PMV BLD AUTO: 10.1 FL (ref 8.5–12.5)
POTASSIUM BLD-SCNC: 4.1 MMOL/L (ref 3.5–5)
RBC # BLD AUTO: 4.49 MILL/UL (ref 3.8–5.4)
SODIUM SERPL-SCNC: 136 MMOL/L (ref 136–145)
TSH SERPL DL<=0.005 MIU/L-ACNC: 0.9 UIU/ML (ref 0.3–5)
WBC: 4 THOU/UL (ref 4–11)

## 2018-01-04 LAB — 25(OH)D3 SERPL-MCNC: 36.5 NG/ML (ref 30–80)

## 2018-01-09 ENCOUNTER — COMMUNICATION - HEALTHEAST (OUTPATIENT)
Dept: BEHAVIORAL HEALTH | Facility: CLINIC | Age: 58
End: 2018-01-09

## 2018-01-09 ENCOUNTER — RECORDS - HEALTHEAST (OUTPATIENT)
Dept: ADMINISTRATIVE | Facility: OTHER | Age: 58
End: 2018-01-09

## 2018-01-09 ENCOUNTER — AMBULATORY - HEALTHEAST (OUTPATIENT)
Dept: BEHAVIORAL HEALTH | Facility: CLINIC | Age: 58
End: 2018-01-09

## 2018-01-09 DIAGNOSIS — R94.31 PROLONGED QT INTERVAL: ICD-10-CM

## 2018-01-09 DIAGNOSIS — R41.89 COGNITIVE DEFICITS: ICD-10-CM

## 2018-01-11 ENCOUNTER — OFFICE VISIT - HEALTHEAST (OUTPATIENT)
Dept: INTERNAL MEDICINE | Facility: CLINIC | Age: 58
End: 2018-01-11

## 2018-01-11 DIAGNOSIS — R29.6 RECURRENT FALLS: ICD-10-CM

## 2018-01-11 DIAGNOSIS — R41.82 ALTERED MENTAL STATUS: ICD-10-CM

## 2018-01-11 ASSESSMENT — MIFFLIN-ST. JEOR: SCORE: 1947.54

## 2018-01-12 ENCOUNTER — COMMUNICATION - HEALTHEAST (OUTPATIENT)
Dept: NURSING | Facility: CLINIC | Age: 58
End: 2018-01-12

## 2018-01-17 ENCOUNTER — RECORDS - HEALTHEAST (OUTPATIENT)
Dept: ADMINISTRATIVE | Facility: OTHER | Age: 58
End: 2018-01-17

## 2018-01-18 ENCOUNTER — OFFICE VISIT - HEALTHEAST (OUTPATIENT)
Dept: BEHAVIORAL HEALTH | Facility: CLINIC | Age: 58
End: 2018-01-18

## 2018-01-18 DIAGNOSIS — F60.3 BORDERLINE PERSONALITY DISORDER (H): ICD-10-CM

## 2018-01-18 DIAGNOSIS — R41.89 COGNITIVE DEFICITS: ICD-10-CM

## 2018-01-18 DIAGNOSIS — F33.1 DEPRESSION, MAJOR, RECURRENT, MODERATE (H): ICD-10-CM

## 2018-01-18 DIAGNOSIS — F41.1 GENERALIZED ANXIETY DISORDER: ICD-10-CM

## 2018-01-24 ENCOUNTER — COMMUNICATION - HEALTHEAST (OUTPATIENT)
Dept: BEHAVIORAL HEALTH | Facility: CLINIC | Age: 58
End: 2018-01-24

## 2018-01-26 ENCOUNTER — COMMUNICATION - HEALTHEAST (OUTPATIENT)
Dept: BEHAVIORAL HEALTH | Facility: CLINIC | Age: 58
End: 2018-01-26

## 2018-02-01 ENCOUNTER — OFFICE VISIT - HEALTHEAST (OUTPATIENT)
Dept: BEHAVIORAL HEALTH | Facility: CLINIC | Age: 58
End: 2018-02-01

## 2018-02-01 DIAGNOSIS — F33.1 DEPRESSION, MAJOR, RECURRENT, MODERATE (H): ICD-10-CM

## 2018-02-01 DIAGNOSIS — F41.1 GENERALIZED ANXIETY DISORDER: ICD-10-CM

## 2018-02-01 DIAGNOSIS — F60.3 BORDERLINE PERSONALITY DISORDER (H): ICD-10-CM

## 2018-02-01 DIAGNOSIS — R41.89 COGNITIVE DEFICITS: ICD-10-CM

## 2018-02-06 ENCOUNTER — COMMUNICATION - HEALTHEAST (OUTPATIENT)
Dept: NURSING | Facility: CLINIC | Age: 58
End: 2018-02-06

## 2018-02-06 DIAGNOSIS — I26.99 PULMONARY EMBOLISM WITH INFARCTION (H): ICD-10-CM

## 2018-02-13 ENCOUNTER — AMBULATORY - HEALTHEAST (OUTPATIENT)
Dept: BEHAVIORAL HEALTH | Facility: CLINIC | Age: 58
End: 2018-02-13

## 2018-02-13 ENCOUNTER — COMMUNICATION - HEALTHEAST (OUTPATIENT)
Dept: BEHAVIORAL HEALTH | Facility: CLINIC | Age: 58
End: 2018-02-13

## 2018-02-13 DIAGNOSIS — F60.3 BORDERLINE PERSONALITY DISORDER (H): ICD-10-CM

## 2018-02-13 DIAGNOSIS — F33.2 SEVERE EPISODE OF RECURRENT MAJOR DEPRESSIVE DISORDER, WITHOUT PSYCHOTIC FEATURES (H): ICD-10-CM

## 2018-02-13 DIAGNOSIS — F17.218 NICOTINE DEPENDENCE, CIGARETTES, WITH OTHER NICOTINE-INDUCED DISORDERS: ICD-10-CM

## 2018-02-15 ENCOUNTER — COMMUNICATION - HEALTHEAST (OUTPATIENT)
Dept: NURSING | Facility: CLINIC | Age: 58
End: 2018-02-15

## 2018-02-15 ENCOUNTER — AMBULATORY - HEALTHEAST (OUTPATIENT)
Dept: BEHAVIORAL HEALTH | Facility: CLINIC | Age: 58
End: 2018-02-15

## 2018-02-16 ENCOUNTER — COMMUNICATION - HEALTHEAST (OUTPATIENT)
Dept: LAB | Facility: CLINIC | Age: 58
End: 2018-02-16

## 2018-02-16 ENCOUNTER — AMBULATORY - HEALTHEAST (OUTPATIENT)
Dept: LAB | Facility: CLINIC | Age: 58
End: 2018-02-16

## 2018-02-16 ENCOUNTER — OFFICE VISIT - HEALTHEAST (OUTPATIENT)
Dept: FAMILY MEDICINE | Facility: CLINIC | Age: 58
End: 2018-02-16

## 2018-02-16 DIAGNOSIS — K92.1 BLACK STOOLS: ICD-10-CM

## 2018-02-16 DIAGNOSIS — I26.99 PULMONARY EMBOLISM WITH INFARCTION (H): ICD-10-CM

## 2018-02-16 DIAGNOSIS — R79.1 ELEVATED INR: ICD-10-CM

## 2018-02-16 LAB
HEMOCCULT SP1 STL QL: NEGATIVE
HGB BLD-MCNC: 14.2 G/DL (ref 12–16)
INR PPP: 3.74 (ref 0.9–1.1)

## 2018-02-16 ASSESSMENT — MIFFLIN-ST. JEOR: SCORE: 1929.4

## 2018-02-19 ENCOUNTER — COMMUNICATION - HEALTHEAST (OUTPATIENT)
Dept: NURSING | Facility: CLINIC | Age: 58
End: 2018-02-19

## 2018-02-19 ENCOUNTER — AMBULATORY - HEALTHEAST (OUTPATIENT)
Dept: LAB | Facility: CLINIC | Age: 58
End: 2018-02-19

## 2018-02-19 DIAGNOSIS — I26.99 PULMONARY EMBOLISM WITH INFARCTION (H): ICD-10-CM

## 2018-02-19 LAB — INR PPP: 2.3 (ref 0.9–1.1)

## 2018-02-23 ENCOUNTER — COMMUNICATION - HEALTHEAST (OUTPATIENT)
Dept: INTERNAL MEDICINE | Facility: CLINIC | Age: 58
End: 2018-02-23

## 2018-02-23 ENCOUNTER — OFFICE VISIT - HEALTHEAST (OUTPATIENT)
Dept: INTERNAL MEDICINE | Facility: CLINIC | Age: 58
End: 2018-02-23

## 2018-02-23 DIAGNOSIS — N18.30 STAGE 3 CHRONIC KIDNEY DISEASE (H): ICD-10-CM

## 2018-02-23 DIAGNOSIS — E03.4 HYPOTHYROIDISM DUE TO ACQUIRED ATROPHY OF THYROID: ICD-10-CM

## 2018-02-23 DIAGNOSIS — R29.6 FREQUENT FALLS: ICD-10-CM

## 2018-02-23 DIAGNOSIS — F17.218 NICOTINE DEPENDENCE, CIGARETTES, WITH OTHER NICOTINE-INDUCED DISORDERS: ICD-10-CM

## 2018-02-23 DIAGNOSIS — E66.01 MORBID OBESITY WITH BMI OF 45.0-49.9, ADULT (H): ICD-10-CM

## 2018-02-23 DIAGNOSIS — I26.99 PULMONARY EMBOLISM WITH INFARCTION (H): ICD-10-CM

## 2018-02-23 DIAGNOSIS — I10 ESSENTIAL HYPERTENSION WITH GOAL BLOOD PRESSURE LESS THAN 140/90: ICD-10-CM

## 2018-02-23 DIAGNOSIS — F41.9 ANXIETY DISORDER, UNSPECIFIED TYPE: ICD-10-CM

## 2018-02-23 LAB — INR PPP: 4 (ref 0.9–1.1)

## 2018-02-23 ASSESSMENT — MIFFLIN-ST. JEOR: SCORE: 1924.86

## 2018-02-28 ENCOUNTER — COMMUNICATION - HEALTHEAST (OUTPATIENT)
Dept: INTERNAL MEDICINE | Facility: CLINIC | Age: 58
End: 2018-02-28

## 2018-02-28 DIAGNOSIS — E03.9 HYPOTHYROIDISM: ICD-10-CM

## 2018-03-01 ENCOUNTER — COMMUNICATION - HEALTHEAST (OUTPATIENT)
Dept: NURSING | Facility: CLINIC | Age: 58
End: 2018-03-01

## 2018-03-01 ENCOUNTER — AMBULATORY - HEALTHEAST (OUTPATIENT)
Dept: LAB | Facility: CLINIC | Age: 58
End: 2018-03-01

## 2018-03-01 ENCOUNTER — OFFICE VISIT - HEALTHEAST (OUTPATIENT)
Dept: BEHAVIORAL HEALTH | Facility: CLINIC | Age: 58
End: 2018-03-01

## 2018-03-01 DIAGNOSIS — F41.1 GENERALIZED ANXIETY DISORDER: ICD-10-CM

## 2018-03-01 DIAGNOSIS — I26.99 PULMONARY EMBOLISM WITH INFARCTION (H): ICD-10-CM

## 2018-03-01 DIAGNOSIS — R41.89 COGNITIVE DEFICITS: ICD-10-CM

## 2018-03-01 DIAGNOSIS — F33.1 DEPRESSION, MAJOR, RECURRENT, MODERATE (H): ICD-10-CM

## 2018-03-01 DIAGNOSIS — F60.3 BORDERLINE PERSONALITY DISORDER (H): ICD-10-CM

## 2018-03-01 LAB — INR PPP: 1.4 (ref 0.9–1.1)

## 2018-03-02 ENCOUNTER — OFFICE VISIT - HEALTHEAST (OUTPATIENT)
Dept: PHYSICAL THERAPY | Facility: REHABILITATION | Age: 58
End: 2018-03-02

## 2018-03-02 ENCOUNTER — COMMUNICATION - HEALTHEAST (OUTPATIENT)
Dept: INTERNAL MEDICINE | Facility: CLINIC | Age: 58
End: 2018-03-02

## 2018-03-02 DIAGNOSIS — R53.1 DECREASED STRENGTH, ENDURANCE, AND MOBILITY: ICD-10-CM

## 2018-03-02 DIAGNOSIS — Z74.09 DECREASED STRENGTH, ENDURANCE, AND MOBILITY: ICD-10-CM

## 2018-03-02 DIAGNOSIS — R26.81 GAIT INSTABILITY: ICD-10-CM

## 2018-03-02 DIAGNOSIS — I26.99 PULMONARY EMBOLISM WITH INFARCTION (H): ICD-10-CM

## 2018-03-02 DIAGNOSIS — R68.89 DECREASED STRENGTH, ENDURANCE, AND MOBILITY: ICD-10-CM

## 2018-03-02 DIAGNOSIS — M62.81 GENERALIZED MUSCLE WEAKNESS: ICD-10-CM

## 2018-03-06 ENCOUNTER — COMMUNICATION - HEALTHEAST (OUTPATIENT)
Dept: BEHAVIORAL HEALTH | Facility: CLINIC | Age: 58
End: 2018-03-06

## 2018-03-07 ENCOUNTER — OFFICE VISIT - HEALTHEAST (OUTPATIENT)
Dept: PHYSICAL THERAPY | Facility: REHABILITATION | Age: 58
End: 2018-03-07

## 2018-03-07 DIAGNOSIS — R68.89 DECREASED STRENGTH, ENDURANCE, AND MOBILITY: ICD-10-CM

## 2018-03-07 DIAGNOSIS — M62.81 GENERALIZED MUSCLE WEAKNESS: ICD-10-CM

## 2018-03-07 DIAGNOSIS — R26.81 GAIT INSTABILITY: ICD-10-CM

## 2018-03-07 DIAGNOSIS — Z74.09 DECREASED STRENGTH, ENDURANCE, AND MOBILITY: ICD-10-CM

## 2018-03-07 DIAGNOSIS — R53.1 DECREASED STRENGTH, ENDURANCE, AND MOBILITY: ICD-10-CM

## 2018-03-09 ENCOUNTER — COMMUNICATION - HEALTHEAST (OUTPATIENT)
Dept: PHYSICAL THERAPY | Facility: REHABILITATION | Age: 58
End: 2018-03-09

## 2018-03-13 ENCOUNTER — OFFICE VISIT - HEALTHEAST (OUTPATIENT)
Dept: PHYSICAL THERAPY | Facility: REHABILITATION | Age: 58
End: 2018-03-13

## 2018-03-13 DIAGNOSIS — R53.1 DECREASED STRENGTH, ENDURANCE, AND MOBILITY: ICD-10-CM

## 2018-03-13 DIAGNOSIS — M62.81 GENERALIZED MUSCLE WEAKNESS: ICD-10-CM

## 2018-03-13 DIAGNOSIS — Z74.09 DECREASED STRENGTH, ENDURANCE, AND MOBILITY: ICD-10-CM

## 2018-03-13 DIAGNOSIS — R26.81 GAIT INSTABILITY: ICD-10-CM

## 2018-03-13 DIAGNOSIS — R68.89 DECREASED STRENGTH, ENDURANCE, AND MOBILITY: ICD-10-CM

## 2018-03-14 ENCOUNTER — COMMUNICATION - HEALTHEAST (OUTPATIENT)
Dept: SCHEDULING | Facility: CLINIC | Age: 58
End: 2018-03-14

## 2018-03-15 ENCOUNTER — OFFICE VISIT - HEALTHEAST (OUTPATIENT)
Dept: BEHAVIORAL HEALTH | Facility: CLINIC | Age: 58
End: 2018-03-15

## 2018-03-15 DIAGNOSIS — F60.3 BORDERLINE PERSONALITY DISORDER (H): ICD-10-CM

## 2018-03-15 DIAGNOSIS — F33.1 DEPRESSION, MAJOR, RECURRENT, MODERATE (H): ICD-10-CM

## 2018-03-15 DIAGNOSIS — F41.1 GENERALIZED ANXIETY DISORDER: ICD-10-CM

## 2018-03-15 DIAGNOSIS — R41.89 COGNITIVE DEFICITS: ICD-10-CM

## 2018-03-16 ENCOUNTER — OFFICE VISIT - HEALTHEAST (OUTPATIENT)
Dept: PHYSICAL THERAPY | Facility: REHABILITATION | Age: 58
End: 2018-03-16

## 2018-03-16 ENCOUNTER — COMMUNICATION - HEALTHEAST (OUTPATIENT)
Dept: NURSING | Facility: CLINIC | Age: 58
End: 2018-03-16

## 2018-03-16 DIAGNOSIS — Z74.09 DECREASED STRENGTH, ENDURANCE, AND MOBILITY: ICD-10-CM

## 2018-03-16 DIAGNOSIS — R26.81 GAIT INSTABILITY: ICD-10-CM

## 2018-03-16 DIAGNOSIS — R53.1 DECREASED STRENGTH, ENDURANCE, AND MOBILITY: ICD-10-CM

## 2018-03-16 DIAGNOSIS — R68.89 DECREASED STRENGTH, ENDURANCE, AND MOBILITY: ICD-10-CM

## 2018-03-16 DIAGNOSIS — M62.81 GENERALIZED MUSCLE WEAKNESS: ICD-10-CM

## 2018-03-20 ENCOUNTER — OFFICE VISIT - HEALTHEAST (OUTPATIENT)
Dept: PHYSICAL THERAPY | Facility: REHABILITATION | Age: 58
End: 2018-03-20

## 2018-03-20 DIAGNOSIS — M62.81 GENERALIZED MUSCLE WEAKNESS: ICD-10-CM

## 2018-03-20 DIAGNOSIS — R26.81 GAIT INSTABILITY: ICD-10-CM

## 2018-03-20 DIAGNOSIS — R68.89 DECREASED STRENGTH, ENDURANCE, AND MOBILITY: ICD-10-CM

## 2018-03-20 DIAGNOSIS — R53.1 DECREASED STRENGTH, ENDURANCE, AND MOBILITY: ICD-10-CM

## 2018-03-20 DIAGNOSIS — Z74.09 DECREASED STRENGTH, ENDURANCE, AND MOBILITY: ICD-10-CM

## 2018-03-23 ENCOUNTER — OFFICE VISIT - HEALTHEAST (OUTPATIENT)
Dept: PHYSICAL THERAPY | Facility: REHABILITATION | Age: 58
End: 2018-03-23

## 2018-03-23 DIAGNOSIS — R26.81 GAIT INSTABILITY: ICD-10-CM

## 2018-03-23 DIAGNOSIS — R68.89 DECREASED STRENGTH, ENDURANCE, AND MOBILITY: ICD-10-CM

## 2018-03-23 DIAGNOSIS — R53.1 DECREASED STRENGTH, ENDURANCE, AND MOBILITY: ICD-10-CM

## 2018-03-23 DIAGNOSIS — M62.81 GENERALIZED MUSCLE WEAKNESS: ICD-10-CM

## 2018-03-23 DIAGNOSIS — Z74.09 DECREASED STRENGTH, ENDURANCE, AND MOBILITY: ICD-10-CM

## 2018-03-26 ENCOUNTER — COMMUNICATION - HEALTHEAST (OUTPATIENT)
Dept: BEHAVIORAL HEALTH | Facility: CLINIC | Age: 58
End: 2018-03-26

## 2018-03-28 ENCOUNTER — COMMUNICATION - HEALTHEAST (OUTPATIENT)
Dept: INTERNAL MEDICINE | Facility: CLINIC | Age: 58
End: 2018-03-28

## 2018-03-28 ENCOUNTER — COMMUNICATION - HEALTHEAST (OUTPATIENT)
Dept: ANTICOAGULATION | Facility: CLINIC | Age: 58
End: 2018-03-28

## 2018-03-28 ENCOUNTER — AMBULATORY - HEALTHEAST (OUTPATIENT)
Dept: LAB | Facility: CLINIC | Age: 58
End: 2018-03-28

## 2018-03-28 DIAGNOSIS — E55.9 VITAMIN D DEFICIENCY: ICD-10-CM

## 2018-03-28 DIAGNOSIS — I26.99 PULMONARY EMBOLISM WITH INFARCTION (H): ICD-10-CM

## 2018-03-28 LAB — INR PPP: 2.4 (ref 0.9–1.1)

## 2018-04-03 ENCOUNTER — RECORDS - HEALTHEAST (OUTPATIENT)
Dept: ADMINISTRATIVE | Facility: OTHER | Age: 58
End: 2018-04-03

## 2018-04-11 ENCOUNTER — COMMUNICATION - HEALTHEAST (OUTPATIENT)
Dept: ANTICOAGULATION | Facility: CLINIC | Age: 58
End: 2018-04-11

## 2018-04-11 ENCOUNTER — COMMUNICATION - HEALTHEAST (OUTPATIENT)
Dept: INTERNAL MEDICINE | Facility: CLINIC | Age: 58
End: 2018-04-11

## 2018-04-11 ENCOUNTER — AMBULATORY - HEALTHEAST (OUTPATIENT)
Dept: LAB | Facility: CLINIC | Age: 58
End: 2018-04-11

## 2018-04-11 ENCOUNTER — COMMUNICATION - HEALTHEAST (OUTPATIENT)
Dept: SCHEDULING | Facility: CLINIC | Age: 58
End: 2018-04-11

## 2018-04-11 DIAGNOSIS — I26.99 PULMONARY EMBOLISM WITH INFARCTION (H): ICD-10-CM

## 2018-04-11 LAB — INR PPP: 4.4 (ref 0.9–1.1)

## 2018-04-12 ENCOUNTER — COMMUNICATION - HEALTHEAST (OUTPATIENT)
Dept: ANTICOAGULATION | Facility: CLINIC | Age: 58
End: 2018-04-12

## 2018-04-12 DIAGNOSIS — I26.99 PULMONARY EMBOLISM WITH INFARCTION (H): ICD-10-CM

## 2018-04-17 ENCOUNTER — RECORDS - HEALTHEAST (OUTPATIENT)
Dept: ADMINISTRATIVE | Facility: OTHER | Age: 58
End: 2018-04-17

## 2018-04-17 ENCOUNTER — COMMUNICATION - HEALTHEAST (OUTPATIENT)
Dept: BEHAVIORAL HEALTH | Facility: CLINIC | Age: 58
End: 2018-04-17

## 2018-04-20 ENCOUNTER — COMMUNICATION - HEALTHEAST (OUTPATIENT)
Dept: BEHAVIORAL HEALTH | Facility: CLINIC | Age: 58
End: 2018-04-20

## 2018-04-25 ENCOUNTER — COMMUNICATION - HEALTHEAST (OUTPATIENT)
Dept: BEHAVIORAL HEALTH | Facility: CLINIC | Age: 58
End: 2018-04-25

## 2018-04-25 ENCOUNTER — OFFICE VISIT - HEALTHEAST (OUTPATIENT)
Dept: INTERNAL MEDICINE | Facility: CLINIC | Age: 58
End: 2018-04-25

## 2018-04-25 ENCOUNTER — COMMUNICATION - HEALTHEAST (OUTPATIENT)
Dept: ANTICOAGULATION | Facility: CLINIC | Age: 58
End: 2018-04-25

## 2018-04-25 ENCOUNTER — COMMUNICATION - HEALTHEAST (OUTPATIENT)
Dept: INTERNAL MEDICINE | Facility: CLINIC | Age: 58
End: 2018-04-25

## 2018-04-25 DIAGNOSIS — I26.99 PULMONARY EMBOLISM WITH INFARCTION (H): ICD-10-CM

## 2018-04-25 DIAGNOSIS — F25.9 SCHIZOAFFECTIVE DISORDER (H): ICD-10-CM

## 2018-04-25 DIAGNOSIS — N18.30 STAGE 3 CHRONIC KIDNEY DISEASE (H): ICD-10-CM

## 2018-04-25 DIAGNOSIS — K21.9 GASTROESOPHAGEAL REFLUX DISEASE WITHOUT ESOPHAGITIS: ICD-10-CM

## 2018-04-25 DIAGNOSIS — E03.4 HYPOTHYROIDISM DUE TO ACQUIRED ATROPHY OF THYROID: ICD-10-CM

## 2018-04-25 DIAGNOSIS — I10 ESSENTIAL HYPERTENSION WITH GOAL BLOOD PRESSURE LESS THAN 140/90: ICD-10-CM

## 2018-04-25 DIAGNOSIS — E27.8 ADRENAL MASS (H): ICD-10-CM

## 2018-04-25 DIAGNOSIS — E66.01 MORBID OBESITY WITH BMI OF 45.0-49.9, ADULT (H): ICD-10-CM

## 2018-04-25 DIAGNOSIS — F41.9 ANXIETY DISORDER, UNSPECIFIED TYPE: ICD-10-CM

## 2018-04-25 DIAGNOSIS — R29.6 FREQUENT FALLS: ICD-10-CM

## 2018-04-25 LAB
ALBUMIN SERPL-MCNC: 3 G/DL (ref 3.5–5)
ALP SERPL-CCNC: 91 U/L (ref 45–120)
ALT SERPL W P-5'-P-CCNC: 18 U/L (ref 0–45)
ANION GAP SERPL CALCULATED.3IONS-SCNC: 9 MMOL/L (ref 5–18)
AST SERPL W P-5'-P-CCNC: 16 U/L (ref 0–40)
BILIRUB SERPL-MCNC: 0.3 MG/DL (ref 0–1)
BUN SERPL-MCNC: 5 MG/DL (ref 8–22)
CALCIUM SERPL-MCNC: 8.5 MG/DL (ref 8.5–10.5)
CHLORIDE BLD-SCNC: 108 MMOL/L (ref 98–107)
CO2 SERPL-SCNC: 23 MMOL/L (ref 22–31)
CREAT SERPL-MCNC: 1.33 MG/DL (ref 0.6–1.1)
ERYTHROCYTE [DISTWIDTH] IN BLOOD BY AUTOMATED COUNT: 12.1 % (ref 11–14.5)
GFR SERPL CREATININE-BSD FRML MDRD: 41 ML/MIN/1.73M2
GLUCOSE BLD-MCNC: 94 MG/DL (ref 70–125)
HCT VFR BLD AUTO: 39.3 % (ref 35–47)
HGB BLD-MCNC: 13 G/DL (ref 12–16)
INR PPP: 4.3 (ref 0.9–1.1)
MCH RBC QN AUTO: 29.8 PG (ref 27–34)
MCHC RBC AUTO-ENTMCNC: 33.2 G/DL (ref 32–36)
MCV RBC AUTO: 90 FL (ref 80–100)
PLATELET # BLD AUTO: 185 THOU/UL (ref 140–440)
PMV BLD AUTO: 7.6 FL (ref 7–10)
POTASSIUM BLD-SCNC: 3.9 MMOL/L (ref 3.5–5)
PROT SERPL-MCNC: 6.2 G/DL (ref 6–8)
RBC # BLD AUTO: 4.38 MILL/UL (ref 3.8–5.4)
SODIUM SERPL-SCNC: 140 MMOL/L (ref 136–145)
WBC: 3.1 THOU/UL (ref 4–11)

## 2018-04-25 ASSESSMENT — MIFFLIN-ST. JEOR: SCORE: 1924.86

## 2018-04-26 ENCOUNTER — COMMUNICATION - HEALTHEAST (OUTPATIENT)
Dept: INTERNAL MEDICINE | Facility: CLINIC | Age: 58
End: 2018-04-26

## 2018-04-26 ENCOUNTER — OFFICE VISIT - HEALTHEAST (OUTPATIENT)
Dept: BEHAVIORAL HEALTH | Facility: CLINIC | Age: 58
End: 2018-04-26

## 2018-04-26 ENCOUNTER — AMBULATORY - HEALTHEAST (OUTPATIENT)
Dept: BEHAVIORAL HEALTH | Facility: CLINIC | Age: 58
End: 2018-04-26

## 2018-04-26 DIAGNOSIS — F41.1 GENERALIZED ANXIETY DISORDER: ICD-10-CM

## 2018-04-26 DIAGNOSIS — F33.1 DEPRESSION, MAJOR, RECURRENT, MODERATE (H): ICD-10-CM

## 2018-04-26 DIAGNOSIS — R41.89 COGNITIVE DEFICITS: ICD-10-CM

## 2018-04-26 DIAGNOSIS — F60.3 BORDERLINE PERSONALITY DISORDER (H): ICD-10-CM

## 2018-05-01 ENCOUNTER — COMMUNICATION - HEALTHEAST (OUTPATIENT)
Dept: BEHAVIORAL HEALTH | Facility: CLINIC | Age: 58
End: 2018-05-01

## 2018-05-01 ENCOUNTER — RECORDS - HEALTHEAST (OUTPATIENT)
Dept: ADMINISTRATIVE | Facility: OTHER | Age: 58
End: 2018-05-01

## 2018-05-09 ENCOUNTER — AMBULATORY - HEALTHEAST (OUTPATIENT)
Dept: LAB | Facility: CLINIC | Age: 58
End: 2018-05-09

## 2018-05-09 ENCOUNTER — COMMUNICATION - HEALTHEAST (OUTPATIENT)
Dept: ANTICOAGULATION | Facility: CLINIC | Age: 58
End: 2018-05-09

## 2018-05-09 ENCOUNTER — COMMUNICATION - HEALTHEAST (OUTPATIENT)
Dept: BEHAVIORAL HEALTH | Facility: HOSPITAL | Age: 58
End: 2018-05-09

## 2018-05-09 ENCOUNTER — COMMUNICATION - HEALTHEAST (OUTPATIENT)
Dept: BEHAVIORAL HEALTH | Facility: CLINIC | Age: 58
End: 2018-05-09

## 2018-05-09 DIAGNOSIS — F33.2 SEVERE EPISODE OF RECURRENT MAJOR DEPRESSIVE DISORDER, WITHOUT PSYCHOTIC FEATURES (H): ICD-10-CM

## 2018-05-09 DIAGNOSIS — F60.3 BORDERLINE PERSONALITY DISORDER (H): ICD-10-CM

## 2018-05-09 DIAGNOSIS — I26.99 PULMONARY EMBOLISM WITH INFARCTION (H): ICD-10-CM

## 2018-05-09 DIAGNOSIS — F17.218 NICOTINE DEPENDENCE, CIGARETTES, WITH OTHER NICOTINE-INDUCED DISORDERS: ICD-10-CM

## 2018-05-09 LAB — INR PPP: 2.4 (ref 0.9–1.1)

## 2018-05-10 ENCOUNTER — COMMUNICATION - HEALTHEAST (OUTPATIENT)
Dept: NURSING | Facility: CLINIC | Age: 58
End: 2018-05-10

## 2018-05-11 ENCOUNTER — AMBULATORY - HEALTHEAST (OUTPATIENT)
Dept: INTERNAL MEDICINE | Facility: CLINIC | Age: 58
End: 2018-05-11

## 2018-05-11 DIAGNOSIS — F41.9 ANXIETY DISORDER, UNSPECIFIED TYPE: ICD-10-CM

## 2018-05-16 ENCOUNTER — COMMUNICATION - HEALTHEAST (OUTPATIENT)
Dept: BEHAVIORAL HEALTH | Facility: CLINIC | Age: 58
End: 2018-05-16

## 2018-05-22 ENCOUNTER — AMBULATORY - HEALTHEAST (OUTPATIENT)
Dept: BEHAVIORAL HEALTH | Facility: CLINIC | Age: 58
End: 2018-05-22

## 2018-05-23 ENCOUNTER — COMMUNICATION - HEALTHEAST (OUTPATIENT)
Dept: ANTICOAGULATION | Facility: CLINIC | Age: 58
End: 2018-05-23

## 2018-05-23 ENCOUNTER — COMMUNICATION - HEALTHEAST (OUTPATIENT)
Dept: BEHAVIORAL HEALTH | Facility: CLINIC | Age: 58
End: 2018-05-23

## 2018-05-23 ENCOUNTER — AMBULATORY - HEALTHEAST (OUTPATIENT)
Dept: LAB | Facility: CLINIC | Age: 58
End: 2018-05-23

## 2018-05-23 DIAGNOSIS — I26.99 PULMONARY EMBOLISM WITH INFARCTION (H): ICD-10-CM

## 2018-05-23 LAB — INR PPP: 2.1 (ref 0.9–1.1)

## 2018-05-24 ENCOUNTER — OFFICE VISIT - HEALTHEAST (OUTPATIENT)
Dept: BEHAVIORAL HEALTH | Facility: CLINIC | Age: 58
End: 2018-05-24

## 2018-05-24 DIAGNOSIS — R41.89 COGNITIVE DEFICITS: ICD-10-CM

## 2018-05-24 DIAGNOSIS — F60.3 BORDERLINE PERSONALITY DISORDER (H): ICD-10-CM

## 2018-05-24 DIAGNOSIS — F33.1 DEPRESSION, MAJOR, RECURRENT, MODERATE (H): ICD-10-CM

## 2018-05-24 DIAGNOSIS — F41.1 GENERALIZED ANXIETY DISORDER: ICD-10-CM

## 2018-05-29 ENCOUNTER — AMBULATORY - HEALTHEAST (OUTPATIENT)
Dept: BEHAVIORAL HEALTH | Facility: CLINIC | Age: 58
End: 2018-05-29

## 2018-05-29 ENCOUNTER — COMMUNICATION - HEALTHEAST (OUTPATIENT)
Dept: BEHAVIORAL HEALTH | Facility: CLINIC | Age: 58
End: 2018-05-29

## 2018-06-06 ENCOUNTER — COMMUNICATION - HEALTHEAST (OUTPATIENT)
Dept: ANTICOAGULATION | Facility: CLINIC | Age: 58
End: 2018-06-06

## 2018-06-06 ENCOUNTER — COMMUNICATION - HEALTHEAST (OUTPATIENT)
Dept: BEHAVIORAL HEALTH | Facility: CLINIC | Age: 58
End: 2018-06-06

## 2018-06-06 ENCOUNTER — AMBULATORY - HEALTHEAST (OUTPATIENT)
Dept: LAB | Facility: CLINIC | Age: 58
End: 2018-06-06

## 2018-06-06 DIAGNOSIS — F33.2 SEVERE EPISODE OF RECURRENT MAJOR DEPRESSIVE DISORDER, WITHOUT PSYCHOTIC FEATURES (H): ICD-10-CM

## 2018-06-06 DIAGNOSIS — I26.99 PULMONARY EMBOLISM WITH INFARCTION (H): ICD-10-CM

## 2018-06-06 DIAGNOSIS — F60.3 BORDERLINE PERSONALITY DISORDER (H): ICD-10-CM

## 2018-06-06 DIAGNOSIS — F17.218 NICOTINE DEPENDENCE, CIGARETTES, WITH OTHER NICOTINE-INDUCED DISORDERS: ICD-10-CM

## 2018-06-06 LAB — INR PPP: 2.6 (ref 0.9–1.1)

## 2018-06-07 ENCOUNTER — OFFICE VISIT - HEALTHEAST (OUTPATIENT)
Dept: BEHAVIORAL HEALTH | Facility: CLINIC | Age: 58
End: 2018-06-07

## 2018-06-07 DIAGNOSIS — F41.1 GENERALIZED ANXIETY DISORDER: ICD-10-CM

## 2018-06-07 DIAGNOSIS — R41.89 COGNITIVE DEFICITS: ICD-10-CM

## 2018-06-07 DIAGNOSIS — F33.1 DEPRESSION, MAJOR, RECURRENT, MODERATE (H): ICD-10-CM

## 2018-06-07 DIAGNOSIS — F60.3 BORDERLINE PERSONALITY DISORDER (H): ICD-10-CM

## 2018-06-12 ENCOUNTER — COMMUNICATION - HEALTHEAST (OUTPATIENT)
Dept: BEHAVIORAL HEALTH | Facility: CLINIC | Age: 58
End: 2018-06-12

## 2018-06-12 ENCOUNTER — OFFICE VISIT - HEALTHEAST (OUTPATIENT)
Dept: BEHAVIORAL HEALTH | Facility: CLINIC | Age: 58
End: 2018-06-12

## 2018-06-12 ENCOUNTER — COMMUNICATION - HEALTHEAST (OUTPATIENT)
Dept: NURSING | Facility: CLINIC | Age: 58
End: 2018-06-12

## 2018-06-12 DIAGNOSIS — F60.3 BORDERLINE PERSONALITY DISORDER IN ADULT (H): ICD-10-CM

## 2018-06-12 DIAGNOSIS — F17.218 NICOTINE DEPENDENCE, CIGARETTES, WITH OTHER NICOTINE-INDUCED DISORDERS: ICD-10-CM

## 2018-06-12 DIAGNOSIS — F33.2 SEVERE EPISODE OF RECURRENT MAJOR DEPRESSIVE DISORDER, WITHOUT PSYCHOTIC FEATURES (H): ICD-10-CM

## 2018-06-12 DIAGNOSIS — G47.33 OSA (OBSTRUCTIVE SLEEP APNEA): ICD-10-CM

## 2018-06-13 ENCOUNTER — COMMUNICATION - HEALTHEAST (OUTPATIENT)
Dept: BEHAVIORAL HEALTH | Facility: CLINIC | Age: 58
End: 2018-06-13

## 2018-06-21 ENCOUNTER — OFFICE VISIT - HEALTHEAST (OUTPATIENT)
Dept: BEHAVIORAL HEALTH | Facility: CLINIC | Age: 58
End: 2018-06-21

## 2018-06-21 DIAGNOSIS — R41.89 COGNITIVE DEFICITS: ICD-10-CM

## 2018-06-21 DIAGNOSIS — F41.1 GENERALIZED ANXIETY DISORDER: ICD-10-CM

## 2018-06-21 DIAGNOSIS — F60.3 BORDERLINE PERSONALITY DISORDER (H): ICD-10-CM

## 2018-06-21 DIAGNOSIS — F33.1 DEPRESSION, MAJOR, RECURRENT, MODERATE (H): ICD-10-CM

## 2018-06-25 ENCOUNTER — AMBULATORY - HEALTHEAST (OUTPATIENT)
Dept: NURSING | Facility: CLINIC | Age: 58
End: 2018-06-25

## 2018-06-25 ENCOUNTER — COMMUNICATION - HEALTHEAST (OUTPATIENT)
Dept: CARE COORDINATION | Facility: CLINIC | Age: 58
End: 2018-06-25

## 2018-06-25 ENCOUNTER — COMMUNICATION - HEALTHEAST (OUTPATIENT)
Dept: BEHAVIORAL HEALTH | Facility: CLINIC | Age: 58
End: 2018-06-25

## 2018-07-03 ENCOUNTER — COMMUNICATION - HEALTHEAST (OUTPATIENT)
Dept: BEHAVIORAL HEALTH | Facility: CLINIC | Age: 58
End: 2018-07-03

## 2018-07-05 ENCOUNTER — COMMUNICATION - HEALTHEAST (OUTPATIENT)
Dept: NURSING | Facility: CLINIC | Age: 58
End: 2018-07-05

## 2018-07-06 ENCOUNTER — COMMUNICATION - HEALTHEAST (OUTPATIENT)
Dept: ANTICOAGULATION | Facility: CLINIC | Age: 58
End: 2018-07-06

## 2018-07-13 ENCOUNTER — AMBULATORY - HEALTHEAST (OUTPATIENT)
Dept: NURSING | Facility: CLINIC | Age: 58
End: 2018-07-13

## 2018-07-18 ENCOUNTER — COMMUNICATION - HEALTHEAST (OUTPATIENT)
Dept: BEHAVIORAL HEALTH | Facility: CLINIC | Age: 58
End: 2018-07-18

## 2018-07-18 DIAGNOSIS — F25.9 SCHIZOAFFECTIVE DISORDER (H): ICD-10-CM

## 2018-07-19 ENCOUNTER — OFFICE VISIT - HEALTHEAST (OUTPATIENT)
Dept: BEHAVIORAL HEALTH | Facility: CLINIC | Age: 58
End: 2018-07-19

## 2018-07-19 DIAGNOSIS — F33.1 DEPRESSION, MAJOR, RECURRENT, MODERATE (H): ICD-10-CM

## 2018-07-19 DIAGNOSIS — R41.89 COGNITIVE DEFICITS: ICD-10-CM

## 2018-07-19 DIAGNOSIS — F41.1 GENERALIZED ANXIETY DISORDER: ICD-10-CM

## 2018-07-19 DIAGNOSIS — F60.3 BORDERLINE PERSONALITY DISORDER (H): ICD-10-CM

## 2018-07-26 ENCOUNTER — COMMUNICATION - HEALTHEAST (OUTPATIENT)
Dept: NURSING | Facility: CLINIC | Age: 58
End: 2018-07-26

## 2018-07-31 ENCOUNTER — RECORDS - HEALTHEAST (OUTPATIENT)
Dept: ADMINISTRATIVE | Facility: OTHER | Age: 58
End: 2018-07-31

## 2018-08-07 ENCOUNTER — OFFICE VISIT - HEALTHEAST (OUTPATIENT)
Dept: BEHAVIORAL HEALTH | Facility: CLINIC | Age: 58
End: 2018-08-07

## 2018-08-07 DIAGNOSIS — F17.218 NICOTINE DEPENDENCE, CIGARETTES, WITH OTHER NICOTINE-INDUCED DISORDERS: ICD-10-CM

## 2018-08-07 DIAGNOSIS — F33.2 SEVERE EPISODE OF RECURRENT MAJOR DEPRESSIVE DISORDER, WITHOUT PSYCHOTIC FEATURES (H): ICD-10-CM

## 2018-08-07 DIAGNOSIS — G47.19 EXCESSIVE DAYTIME SLEEPINESS: ICD-10-CM

## 2018-08-07 ASSESSMENT — MIFFLIN-ST. JEOR: SCORE: 1870.43

## 2018-08-08 ENCOUNTER — COMMUNICATION - HEALTHEAST (OUTPATIENT)
Dept: NURSING | Facility: CLINIC | Age: 58
End: 2018-08-08

## 2018-08-15 ENCOUNTER — COMMUNICATION - HEALTHEAST (OUTPATIENT)
Dept: INTERNAL MEDICINE | Facility: CLINIC | Age: 58
End: 2018-08-15

## 2018-08-15 ENCOUNTER — OFFICE VISIT - HEALTHEAST (OUTPATIENT)
Dept: INTERNAL MEDICINE | Facility: CLINIC | Age: 58
End: 2018-08-15

## 2018-08-15 ENCOUNTER — COMMUNICATION - HEALTHEAST (OUTPATIENT)
Dept: BEHAVIORAL HEALTH | Facility: CLINIC | Age: 58
End: 2018-08-15

## 2018-08-15 ENCOUNTER — COMMUNICATION - HEALTHEAST (OUTPATIENT)
Dept: ANTICOAGULATION | Facility: CLINIC | Age: 58
End: 2018-08-15

## 2018-08-15 ENCOUNTER — COMMUNICATION - HEALTHEAST (OUTPATIENT)
Dept: NURSING | Facility: CLINIC | Age: 58
End: 2018-08-15

## 2018-08-15 DIAGNOSIS — R06.02 SOB (SHORTNESS OF BREATH): ICD-10-CM

## 2018-08-15 DIAGNOSIS — N32.81 OVERACTIVE BLADDER: ICD-10-CM

## 2018-08-15 DIAGNOSIS — F25.9 SCHIZOAFFECTIVE DISORDER (H): ICD-10-CM

## 2018-08-15 DIAGNOSIS — R05.9 COUGH: ICD-10-CM

## 2018-08-15 DIAGNOSIS — I26.99 PULMONARY EMBOLISM WITH INFARCTION (H): ICD-10-CM

## 2018-08-15 DIAGNOSIS — C73 MALIGNANT NEOPLASM OF THYROID GLAND (H): ICD-10-CM

## 2018-08-15 DIAGNOSIS — E78.2 MIXED HYPERLIPIDEMIA: ICD-10-CM

## 2018-08-15 DIAGNOSIS — E66.01 MORBID OBESITY WITH BMI OF 45.0-49.9, ADULT (H): ICD-10-CM

## 2018-08-15 DIAGNOSIS — F17.218 NICOTINE DEPENDENCE, CIGARETTES, WITH OTHER NICOTINE-INDUCED DISORDERS: ICD-10-CM

## 2018-08-15 DIAGNOSIS — J44.9 CHRONIC OBSTRUCTIVE PULMONARY DISEASE (H): ICD-10-CM

## 2018-08-15 DIAGNOSIS — G40.909 NONINTRACTABLE EPILEPSY WITHOUT STATUS EPILEPTICUS, UNSPECIFIED EPILEPSY TYPE (H): ICD-10-CM

## 2018-08-15 DIAGNOSIS — K21.9 GERD (GASTROESOPHAGEAL REFLUX DISEASE): ICD-10-CM

## 2018-08-15 LAB — INR PPP: 3.1 (ref 0.9–1.1)

## 2018-08-15 ASSESSMENT — MIFFLIN-ST. JEOR: SCORE: 1893.11

## 2018-08-27 ENCOUNTER — COMMUNICATION - HEALTHEAST (OUTPATIENT)
Dept: ANTICOAGULATION | Facility: CLINIC | Age: 58
End: 2018-08-27

## 2018-08-29 ENCOUNTER — AMBULATORY - HEALTHEAST (OUTPATIENT)
Dept: BEHAVIORAL HEALTH | Facility: CLINIC | Age: 58
End: 2018-08-29

## 2018-08-29 ENCOUNTER — COMMUNICATION - HEALTHEAST (OUTPATIENT)
Dept: INTERNAL MEDICINE | Facility: CLINIC | Age: 58
End: 2018-08-29

## 2018-08-29 ENCOUNTER — OFFICE VISIT - HEALTHEAST (OUTPATIENT)
Dept: BEHAVIORAL HEALTH | Facility: CLINIC | Age: 58
End: 2018-08-29

## 2018-08-29 DIAGNOSIS — F33.1 DEPRESSION, MAJOR, RECURRENT, MODERATE (H): ICD-10-CM

## 2018-08-29 DIAGNOSIS — R41.89 COGNITIVE DEFICITS: ICD-10-CM

## 2018-08-29 DIAGNOSIS — F41.1 GENERALIZED ANXIETY DISORDER: ICD-10-CM

## 2018-08-29 DIAGNOSIS — F60.3 BORDERLINE PERSONALITY DISORDER (H): ICD-10-CM

## 2018-09-04 ENCOUNTER — COMMUNICATION - HEALTHEAST (OUTPATIENT)
Dept: NURSING | Facility: CLINIC | Age: 58
End: 2018-09-04

## 2018-09-11 ENCOUNTER — COMMUNICATION - HEALTHEAST (OUTPATIENT)
Dept: BEHAVIORAL HEALTH | Facility: CLINIC | Age: 58
End: 2018-09-11

## 2018-09-13 ENCOUNTER — COMMUNICATION - HEALTHEAST (OUTPATIENT)
Dept: INTERNAL MEDICINE | Facility: CLINIC | Age: 58
End: 2018-09-13

## 2018-09-13 ENCOUNTER — OFFICE VISIT - HEALTHEAST (OUTPATIENT)
Dept: INTERNAL MEDICINE | Facility: CLINIC | Age: 58
End: 2018-09-13

## 2018-09-13 ENCOUNTER — OFFICE VISIT - HEALTHEAST (OUTPATIENT)
Dept: BEHAVIORAL HEALTH | Facility: CLINIC | Age: 58
End: 2018-09-13

## 2018-09-13 ENCOUNTER — COMMUNICATION - HEALTHEAST (OUTPATIENT)
Dept: ANTICOAGULATION | Facility: CLINIC | Age: 58
End: 2018-09-13

## 2018-09-13 DIAGNOSIS — F32.1 MODERATE MAJOR DEPRESSION (H): ICD-10-CM

## 2018-09-13 DIAGNOSIS — I26.99 PULMONARY EMBOLISM WITH INFARCTION (H): ICD-10-CM

## 2018-09-13 DIAGNOSIS — G40.909 NONINTRACTABLE EPILEPSY WITHOUT STATUS EPILEPTICUS, UNSPECIFIED EPILEPSY TYPE (H): ICD-10-CM

## 2018-09-13 DIAGNOSIS — Z00.00 ROUTINE GENERAL MEDICAL EXAMINATION AT A HEALTH CARE FACILITY: ICD-10-CM

## 2018-09-13 DIAGNOSIS — F60.3 BORDERLINE PERSONALITY DISORDER (H): ICD-10-CM

## 2018-09-13 DIAGNOSIS — R73.9 HYPERGLYCEMIA: ICD-10-CM

## 2018-09-13 DIAGNOSIS — F33.1 DEPRESSION, MAJOR, RECURRENT, MODERATE (H): ICD-10-CM

## 2018-09-13 DIAGNOSIS — G47.33 OSA (OBSTRUCTIVE SLEEP APNEA): ICD-10-CM

## 2018-09-13 DIAGNOSIS — F41.9 ANXIETY DISORDER, UNSPECIFIED TYPE: ICD-10-CM

## 2018-09-13 DIAGNOSIS — N18.30 STAGE 3 CHRONIC KIDNEY DISEASE (H): ICD-10-CM

## 2018-09-13 DIAGNOSIS — Z71.89 ADVANCE CARE PLANNING: ICD-10-CM

## 2018-09-13 DIAGNOSIS — E78.2 MIXED HYPERLIPIDEMIA: ICD-10-CM

## 2018-09-13 DIAGNOSIS — Z66 DNR (DO NOT RESUSCITATE): ICD-10-CM

## 2018-09-13 DIAGNOSIS — I10 ESSENTIAL HYPERTENSION WITH GOAL BLOOD PRESSURE LESS THAN 140/90: ICD-10-CM

## 2018-09-13 DIAGNOSIS — F41.1 GENERALIZED ANXIETY DISORDER: ICD-10-CM

## 2018-09-13 DIAGNOSIS — G43.809 OTHER MIGRAINE WITHOUT STATUS MIGRAINOSUS, NOT INTRACTABLE: ICD-10-CM

## 2018-09-13 DIAGNOSIS — E03.4 HYPOTHYROIDISM DUE TO ACQUIRED ATROPHY OF THYROID: ICD-10-CM

## 2018-09-13 DIAGNOSIS — R41.89 COGNITIVE DEFICITS: ICD-10-CM

## 2018-09-13 LAB
ALBUMIN SERPL-MCNC: 3.4 G/DL (ref 3.5–5)
ALP SERPL-CCNC: 100 U/L (ref 45–120)
ALT SERPL W P-5'-P-CCNC: 27 U/L (ref 0–45)
ANION GAP SERPL CALCULATED.3IONS-SCNC: 12 MMOL/L (ref 5–18)
AST SERPL W P-5'-P-CCNC: 23 U/L (ref 0–40)
BILIRUB SERPL-MCNC: 0.4 MG/DL (ref 0–1)
BUN SERPL-MCNC: 8 MG/DL (ref 8–22)
CALCIUM SERPL-MCNC: 8.9 MG/DL (ref 8.5–10.5)
CHLORIDE BLD-SCNC: 109 MMOL/L (ref 98–107)
CHOLEST SERPL-MCNC: 249 MG/DL
CO2 SERPL-SCNC: 21 MMOL/L (ref 22–31)
CREAT SERPL-MCNC: 1.4 MG/DL (ref 0.6–1.1)
ERYTHROCYTE [DISTWIDTH] IN BLOOD BY AUTOMATED COUNT: 12.8 % (ref 11–14.5)
FASTING STATUS PATIENT QL REPORTED: YES
GFR SERPL CREATININE-BSD FRML MDRD: 39 ML/MIN/1.73M2
GLUCOSE BLD-MCNC: 93 MG/DL (ref 70–125)
HBA1C MFR BLD: 4.8 % (ref 3.5–6)
HCT VFR BLD AUTO: 41.6 % (ref 35–47)
HDLC SERPL-MCNC: 83 MG/DL
HGB BLD-MCNC: 13.9 G/DL (ref 12–16)
INR PPP: 3.5 (ref 0.9–1.1)
LDLC SERPL CALC-MCNC: 148 MG/DL
MCH RBC QN AUTO: 29.9 PG (ref 27–34)
MCHC RBC AUTO-ENTMCNC: 33.5 G/DL (ref 32–36)
MCV RBC AUTO: 89 FL (ref 80–100)
PLATELET # BLD AUTO: 216 THOU/UL (ref 140–440)
PMV BLD AUTO: 7.6 FL (ref 7–10)
POTASSIUM BLD-SCNC: 4 MMOL/L (ref 3.5–5)
PROT SERPL-MCNC: 6.7 G/DL (ref 6–8)
RBC # BLD AUTO: 4.66 MILL/UL (ref 3.8–5.4)
SODIUM SERPL-SCNC: 142 MMOL/L (ref 136–145)
TRIGL SERPL-MCNC: 91 MG/DL
TSH SERPL DL<=0.005 MIU/L-ACNC: 5.28 UIU/ML (ref 0.3–5)
WBC: 4.9 THOU/UL (ref 4–11)

## 2018-09-13 ASSESSMENT — MIFFLIN-ST. JEOR: SCORE: 1906.72

## 2018-09-14 ENCOUNTER — COMMUNICATION - HEALTHEAST (OUTPATIENT)
Dept: INTERNAL MEDICINE | Facility: CLINIC | Age: 58
End: 2018-09-14

## 2018-09-17 ENCOUNTER — COMMUNICATION - HEALTHEAST (OUTPATIENT)
Dept: BEHAVIORAL HEALTH | Facility: CLINIC | Age: 58
End: 2018-09-17

## 2018-09-21 ENCOUNTER — COMMUNICATION - HEALTHEAST (OUTPATIENT)
Dept: NURSING | Facility: CLINIC | Age: 58
End: 2018-09-21

## 2018-09-26 ENCOUNTER — COMMUNICATION - HEALTHEAST (OUTPATIENT)
Dept: INTERNAL MEDICINE | Facility: CLINIC | Age: 58
End: 2018-09-26

## 2018-09-26 ENCOUNTER — COMMUNICATION - HEALTHEAST (OUTPATIENT)
Dept: BEHAVIORAL HEALTH | Facility: CLINIC | Age: 58
End: 2018-09-26

## 2018-09-26 DIAGNOSIS — F17.218 NICOTINE DEPENDENCE, CIGARETTES, WITH OTHER NICOTINE-INDUCED DISORDERS: ICD-10-CM

## 2018-09-26 DIAGNOSIS — F60.3 BORDERLINE PERSONALITY DISORDER (H): ICD-10-CM

## 2018-09-26 DIAGNOSIS — E55.9 VITAMIN D DEFICIENCY: ICD-10-CM

## 2018-09-26 DIAGNOSIS — F32.9 MAJOR DEPRESSIVE DISORDER WITHOUT PSYCHOTIC FEATURES: ICD-10-CM

## 2018-09-26 DIAGNOSIS — F33.2 SEVERE EPISODE OF RECURRENT MAJOR DEPRESSIVE DISORDER, WITHOUT PSYCHOTIC FEATURES (H): ICD-10-CM

## 2018-09-28 ENCOUNTER — COMMUNICATION - HEALTHEAST (OUTPATIENT)
Dept: ANTICOAGULATION | Facility: CLINIC | Age: 58
End: 2018-09-28

## 2018-10-02 ENCOUNTER — COMMUNICATION - HEALTHEAST (OUTPATIENT)
Dept: NURSING | Facility: CLINIC | Age: 58
End: 2018-10-02

## 2018-10-02 ENCOUNTER — COMMUNICATION - HEALTHEAST (OUTPATIENT)
Dept: ANTICOAGULATION | Facility: CLINIC | Age: 58
End: 2018-10-02

## 2018-10-02 ENCOUNTER — AMBULATORY - HEALTHEAST (OUTPATIENT)
Dept: LAB | Facility: CLINIC | Age: 58
End: 2018-10-02

## 2018-10-02 DIAGNOSIS — I26.99 PULMONARY EMBOLISM WITH INFARCTION (H): ICD-10-CM

## 2018-10-02 LAB — INR PPP: 1.7 (ref 0.9–1.1)

## 2018-10-11 ENCOUNTER — OFFICE VISIT - HEALTHEAST (OUTPATIENT)
Dept: BEHAVIORAL HEALTH | Facility: CLINIC | Age: 58
End: 2018-10-11

## 2018-10-11 DIAGNOSIS — F33.1 DEPRESSION, MAJOR, RECURRENT, MODERATE (H): ICD-10-CM

## 2018-10-11 DIAGNOSIS — R41.89 COGNITIVE DEFICITS: ICD-10-CM

## 2018-10-11 DIAGNOSIS — F60.3 BORDERLINE PERSONALITY DISORDER (H): ICD-10-CM

## 2018-10-11 DIAGNOSIS — F41.1 GENERALIZED ANXIETY DISORDER: ICD-10-CM

## 2018-10-15 ENCOUNTER — COMMUNICATION - HEALTHEAST (OUTPATIENT)
Dept: BEHAVIORAL HEALTH | Facility: CLINIC | Age: 58
End: 2018-10-15

## 2018-10-17 ENCOUNTER — COMMUNICATION - HEALTHEAST (OUTPATIENT)
Dept: ANTICOAGULATION | Facility: CLINIC | Age: 58
End: 2018-10-17

## 2018-10-19 ENCOUNTER — COMMUNICATION - HEALTHEAST (OUTPATIENT)
Dept: ANTICOAGULATION | Facility: CLINIC | Age: 58
End: 2018-10-19

## 2018-10-19 ENCOUNTER — AMBULATORY - HEALTHEAST (OUTPATIENT)
Dept: LAB | Facility: CLINIC | Age: 58
End: 2018-10-19

## 2018-10-19 DIAGNOSIS — I26.99 PULMONARY EMBOLISM WITH INFARCTION (H): ICD-10-CM

## 2018-10-19 LAB — INR PPP: 1.3 (ref 0.9–1.1)

## 2018-10-24 ENCOUNTER — COMMUNICATION - HEALTHEAST (OUTPATIENT)
Dept: BEHAVIORAL HEALTH | Facility: CLINIC | Age: 58
End: 2018-10-24

## 2018-10-24 ENCOUNTER — COMMUNICATION - HEALTHEAST (OUTPATIENT)
Dept: INTERNAL MEDICINE | Facility: CLINIC | Age: 58
End: 2018-10-24

## 2018-10-24 DIAGNOSIS — F17.218 NICOTINE DEPENDENCE, CIGARETTES, WITH OTHER NICOTINE-INDUCED DISORDERS: ICD-10-CM

## 2018-10-24 DIAGNOSIS — F60.3 BORDERLINE PERSONALITY DISORDER (H): ICD-10-CM

## 2018-10-24 DIAGNOSIS — I26.99 PULMONARY EMBOLISM WITH INFARCTION (H): ICD-10-CM

## 2018-10-24 DIAGNOSIS — F33.2 SEVERE EPISODE OF RECURRENT MAJOR DEPRESSIVE DISORDER, WITHOUT PSYCHOTIC FEATURES (H): ICD-10-CM

## 2018-10-24 DIAGNOSIS — F32.9 MAJOR DEPRESSIVE DISORDER WITHOUT PSYCHOTIC FEATURES: ICD-10-CM

## 2018-10-25 ENCOUNTER — AMBULATORY - HEALTHEAST (OUTPATIENT)
Dept: LAB | Facility: CLINIC | Age: 58
End: 2018-10-25

## 2018-10-25 ENCOUNTER — COMMUNICATION - HEALTHEAST (OUTPATIENT)
Dept: ANTICOAGULATION | Facility: CLINIC | Age: 58
End: 2018-10-25

## 2018-10-25 DIAGNOSIS — I26.99 PULMONARY EMBOLISM WITH INFARCTION (H): ICD-10-CM

## 2018-10-25 LAB — INR PPP: 1.2 (ref 0.9–1.1)

## 2018-10-29 ENCOUNTER — AMBULATORY - HEALTHEAST (OUTPATIENT)
Dept: LAB | Facility: CLINIC | Age: 58
End: 2018-10-29

## 2018-10-29 ENCOUNTER — COMMUNICATION - HEALTHEAST (OUTPATIENT)
Dept: ANTICOAGULATION | Facility: CLINIC | Age: 58
End: 2018-10-29

## 2018-10-29 DIAGNOSIS — I26.99 PULMONARY EMBOLISM WITH INFARCTION (H): ICD-10-CM

## 2018-10-29 LAB — INR PPP: 1.9 (ref 0.9–1.1)

## 2018-11-01 ENCOUNTER — AMBULATORY - HEALTHEAST (OUTPATIENT)
Dept: LAB | Facility: CLINIC | Age: 58
End: 2018-11-01

## 2018-11-01 ENCOUNTER — COMMUNICATION - HEALTHEAST (OUTPATIENT)
Dept: ANTICOAGULATION | Facility: CLINIC | Age: 58
End: 2018-11-01

## 2018-11-01 DIAGNOSIS — I26.99 PULMONARY EMBOLISM WITH INFARCTION (H): ICD-10-CM

## 2018-11-01 LAB — INR PPP: 1.9 (ref 0.9–1.1)

## 2018-11-06 ENCOUNTER — OFFICE VISIT - HEALTHEAST (OUTPATIENT)
Dept: BEHAVIORAL HEALTH | Facility: CLINIC | Age: 58
End: 2018-11-06

## 2018-11-06 DIAGNOSIS — F25.1 SCHIZOAFFECTIVE DISORDER, DEPRESSIVE TYPE (H): ICD-10-CM

## 2018-11-06 DIAGNOSIS — F17.218 NICOTINE DEPENDENCE, CIGARETTES, WITH OTHER NICOTINE-INDUCED DISORDERS: ICD-10-CM

## 2018-11-06 DIAGNOSIS — F60.3 BORDERLINE PERSONALITY DISORDER (H): ICD-10-CM

## 2018-11-06 DIAGNOSIS — F32.9 MAJOR DEPRESSIVE DISORDER WITHOUT PSYCHOTIC FEATURES: ICD-10-CM

## 2018-11-06 DIAGNOSIS — F33.2 SEVERE EPISODE OF RECURRENT MAJOR DEPRESSIVE DISORDER, WITHOUT PSYCHOTIC FEATURES (H): ICD-10-CM

## 2018-11-06 ASSESSMENT — MIFFLIN-ST. JEOR: SCORE: 1974.76

## 2018-11-08 ENCOUNTER — OFFICE VISIT - HEALTHEAST (OUTPATIENT)
Dept: BEHAVIORAL HEALTH | Facility: CLINIC | Age: 58
End: 2018-11-08

## 2018-11-08 DIAGNOSIS — F60.3 BORDERLINE PERSONALITY DISORDER (H): ICD-10-CM

## 2018-11-08 DIAGNOSIS — R41.89 COGNITIVE DEFICITS: ICD-10-CM

## 2018-11-08 DIAGNOSIS — F33.1 DEPRESSION, MAJOR, RECURRENT, MODERATE (H): ICD-10-CM

## 2018-11-08 DIAGNOSIS — F41.1 GENERALIZED ANXIETY DISORDER: ICD-10-CM

## 2018-11-09 ENCOUNTER — COMMUNICATION - HEALTHEAST (OUTPATIENT)
Dept: NURSING | Facility: CLINIC | Age: 58
End: 2018-11-09

## 2018-11-12 ENCOUNTER — AMBULATORY - HEALTHEAST (OUTPATIENT)
Dept: LAB | Facility: CLINIC | Age: 58
End: 2018-11-12

## 2018-11-12 ENCOUNTER — COMMUNICATION - HEALTHEAST (OUTPATIENT)
Dept: INTERNAL MEDICINE | Facility: CLINIC | Age: 58
End: 2018-11-12

## 2018-11-12 ENCOUNTER — COMMUNICATION - HEALTHEAST (OUTPATIENT)
Dept: ANTICOAGULATION | Facility: CLINIC | Age: 58
End: 2018-11-12

## 2018-11-12 DIAGNOSIS — I26.99 PULMONARY EMBOLISM WITH INFARCTION (H): ICD-10-CM

## 2018-11-12 LAB — INR PPP: 2.2 (ref 0.9–1.1)

## 2018-11-13 ENCOUNTER — COMMUNICATION - HEALTHEAST (OUTPATIENT)
Dept: ANTICOAGULATION | Facility: CLINIC | Age: 58
End: 2018-11-13

## 2018-11-13 DIAGNOSIS — I82.409 DVT (DEEP VENOUS THROMBOSIS) (H): ICD-10-CM

## 2018-11-13 DIAGNOSIS — I26.99 PULMONARY EMBOLISM (H): ICD-10-CM

## 2018-11-19 ENCOUNTER — OFFICE VISIT - HEALTHEAST (OUTPATIENT)
Dept: BEHAVIORAL HEALTH | Facility: CLINIC | Age: 58
End: 2018-11-19

## 2018-11-19 DIAGNOSIS — F33.1 DEPRESSION, MAJOR, RECURRENT, MODERATE (H): ICD-10-CM

## 2018-11-19 DIAGNOSIS — F41.1 GENERALIZED ANXIETY DISORDER: ICD-10-CM

## 2018-11-26 ENCOUNTER — OFFICE VISIT - HEALTHEAST (OUTPATIENT)
Dept: BEHAVIORAL HEALTH | Facility: CLINIC | Age: 58
End: 2018-11-26

## 2018-11-26 DIAGNOSIS — F41.1 GENERALIZED ANXIETY DISORDER: ICD-10-CM

## 2018-11-26 DIAGNOSIS — F33.1 DEPRESSION, MAJOR, RECURRENT, MODERATE (H): ICD-10-CM

## 2018-11-27 ENCOUNTER — COMMUNICATION - HEALTHEAST (OUTPATIENT)
Dept: BEHAVIORAL HEALTH | Facility: CLINIC | Age: 58
End: 2018-11-27

## 2018-11-27 ENCOUNTER — COMMUNICATION - HEALTHEAST (OUTPATIENT)
Dept: ANTICOAGULATION | Facility: CLINIC | Age: 58
End: 2018-11-27

## 2018-11-27 ENCOUNTER — AMBULATORY - HEALTHEAST (OUTPATIENT)
Dept: LAB | Facility: CLINIC | Age: 58
End: 2018-11-27

## 2018-11-27 DIAGNOSIS — I26.99 PULMONARY EMBOLISM (H): ICD-10-CM

## 2018-11-27 DIAGNOSIS — F17.218 NICOTINE DEPENDENCE, CIGARETTES, WITH OTHER NICOTINE-INDUCED DISORDERS: ICD-10-CM

## 2018-11-27 DIAGNOSIS — F60.3 BORDERLINE PERSONALITY DISORDER (H): ICD-10-CM

## 2018-11-27 DIAGNOSIS — F33.2 SEVERE EPISODE OF RECURRENT MAJOR DEPRESSIVE DISORDER, WITHOUT PSYCHOTIC FEATURES (H): ICD-10-CM

## 2018-11-27 DIAGNOSIS — F32.9 MAJOR DEPRESSIVE DISORDER WITHOUT PSYCHOTIC FEATURES: ICD-10-CM

## 2018-11-27 DIAGNOSIS — I82.409 DVT (DEEP VENOUS THROMBOSIS) (H): ICD-10-CM

## 2018-11-27 DIAGNOSIS — I26.99 PULMONARY EMBOLISM WITH INFARCTION (H): ICD-10-CM

## 2018-11-27 LAB — INR PPP: 2.7 (ref 0.9–1.1)

## 2018-11-29 ENCOUNTER — COMMUNICATION - HEALTHEAST (OUTPATIENT)
Dept: INTERNAL MEDICINE | Facility: CLINIC | Age: 58
End: 2018-11-29

## 2018-11-29 DIAGNOSIS — E03.9 HYPOTHYROIDISM: ICD-10-CM

## 2018-11-29 DIAGNOSIS — K21.9 GASTROESOPHAGEAL REFLUX DISEASE WITHOUT ESOPHAGITIS: ICD-10-CM

## 2018-11-29 DIAGNOSIS — E78.2 MIXED HYPERLIPIDEMIA: ICD-10-CM

## 2018-12-03 ENCOUNTER — AMBULATORY - HEALTHEAST (OUTPATIENT)
Dept: BEHAVIORAL HEALTH | Facility: HOSPITAL | Age: 58
End: 2018-12-03

## 2018-12-03 ENCOUNTER — OFFICE VISIT - HEALTHEAST (OUTPATIENT)
Dept: BEHAVIORAL HEALTH | Facility: CLINIC | Age: 58
End: 2018-12-03

## 2018-12-03 DIAGNOSIS — F33.1 DEPRESSION, MAJOR, RECURRENT, MODERATE (H): ICD-10-CM

## 2018-12-03 DIAGNOSIS — F41.1 GENERALIZED ANXIETY DISORDER: ICD-10-CM

## 2018-12-06 ENCOUNTER — AMBULATORY - HEALTHEAST (OUTPATIENT)
Dept: BEHAVIORAL HEALTH | Facility: CLINIC | Age: 58
End: 2018-12-06

## 2018-12-06 ENCOUNTER — OFFICE VISIT - HEALTHEAST (OUTPATIENT)
Dept: BEHAVIORAL HEALTH | Facility: CLINIC | Age: 58
End: 2018-12-06

## 2018-12-06 DIAGNOSIS — F33.1 DEPRESSION, MAJOR, RECURRENT, MODERATE (H): ICD-10-CM

## 2018-12-06 DIAGNOSIS — R41.89 COGNITIVE DEFICITS: ICD-10-CM

## 2018-12-06 DIAGNOSIS — F41.1 GENERALIZED ANXIETY DISORDER: ICD-10-CM

## 2018-12-06 DIAGNOSIS — F33.2 SEVERE EPISODE OF RECURRENT MAJOR DEPRESSIVE DISORDER, WITHOUT PSYCHOTIC FEATURES (H): ICD-10-CM

## 2018-12-06 DIAGNOSIS — F60.3 BORDERLINE PERSONALITY DISORDER (H): ICD-10-CM

## 2018-12-06 DIAGNOSIS — F60.3 BORDERLINE PERSONALITY DISORDER IN ADULT (H): ICD-10-CM

## 2018-12-06 DIAGNOSIS — F17.218 NICOTINE DEPENDENCE, CIGARETTES, WITH OTHER NICOTINE-INDUCED DISORDERS: ICD-10-CM

## 2018-12-06 ASSESSMENT — MIFFLIN-ST. JEOR: SCORE: 2024.65

## 2018-12-07 ENCOUNTER — COMMUNICATION - HEALTHEAST (OUTPATIENT)
Dept: NURSING | Facility: CLINIC | Age: 58
End: 2018-12-07

## 2018-12-10 ENCOUNTER — OFFICE VISIT - HEALTHEAST (OUTPATIENT)
Dept: BEHAVIORAL HEALTH | Facility: CLINIC | Age: 58
End: 2018-12-10

## 2018-12-10 ENCOUNTER — AMBULATORY - HEALTHEAST (OUTPATIENT)
Dept: BEHAVIORAL HEALTH | Facility: HOSPITAL | Age: 58
End: 2018-12-10

## 2018-12-10 DIAGNOSIS — F17.218 NICOTINE DEPENDENCE, CIGARETTES, WITH OTHER NICOTINE-INDUCED DISORDERS: ICD-10-CM

## 2018-12-10 DIAGNOSIS — F32.1 MODERATE MAJOR DEPRESSION (H): ICD-10-CM

## 2018-12-12 ENCOUNTER — COMMUNICATION - HEALTHEAST (OUTPATIENT)
Dept: BEHAVIORAL HEALTH | Facility: CLINIC | Age: 58
End: 2018-12-12

## 2018-12-14 ENCOUNTER — COMMUNICATION - HEALTHEAST (OUTPATIENT)
Dept: NURSING | Facility: CLINIC | Age: 58
End: 2018-12-14

## 2018-12-14 ENCOUNTER — COMMUNICATION - HEALTHEAST (OUTPATIENT)
Dept: ANTICOAGULATION | Facility: CLINIC | Age: 58
End: 2018-12-14

## 2018-12-14 ENCOUNTER — OFFICE VISIT - HEALTHEAST (OUTPATIENT)
Dept: INTERNAL MEDICINE | Facility: CLINIC | Age: 58
End: 2018-12-14

## 2018-12-14 DIAGNOSIS — I10 ESSENTIAL HYPERTENSION WITH GOAL BLOOD PRESSURE LESS THAN 140/90: ICD-10-CM

## 2018-12-14 DIAGNOSIS — I26.99 PULMONARY EMBOLISM WITH INFARCTION (H): ICD-10-CM

## 2018-12-14 DIAGNOSIS — E66.01 MORBID OBESITY WITH BMI OF 45.0-49.9, ADULT (H): ICD-10-CM

## 2018-12-14 DIAGNOSIS — I26.99 PULMONARY EMBOLISM (H): ICD-10-CM

## 2018-12-14 DIAGNOSIS — Z12.31 VISIT FOR SCREENING MAMMOGRAM: ICD-10-CM

## 2018-12-14 DIAGNOSIS — F41.9 ANXIETY DISORDER, UNSPECIFIED TYPE: ICD-10-CM

## 2018-12-14 DIAGNOSIS — N18.30 STAGE 3 CHRONIC KIDNEY DISEASE (H): ICD-10-CM

## 2018-12-14 DIAGNOSIS — E03.4 HYPOTHYROIDISM DUE TO ACQUIRED ATROPHY OF THYROID: ICD-10-CM

## 2018-12-14 DIAGNOSIS — F17.218 NICOTINE DEPENDENCE, CIGARETTES, WITH OTHER NICOTINE-INDUCED DISORDERS: ICD-10-CM

## 2018-12-14 DIAGNOSIS — I82.409 DVT (DEEP VENOUS THROMBOSIS) (H): ICD-10-CM

## 2018-12-14 LAB
ANION GAP SERPL CALCULATED.3IONS-SCNC: 9 MMOL/L (ref 5–18)
BUN SERPL-MCNC: 12 MG/DL (ref 8–22)
CALCIUM SERPL-MCNC: 8.8 MG/DL (ref 8.5–10.5)
CHLORIDE BLD-SCNC: 106 MMOL/L (ref 98–107)
CO2 SERPL-SCNC: 26 MMOL/L (ref 22–31)
CREAT SERPL-MCNC: 1.32 MG/DL (ref 0.6–1.1)
GFR SERPL CREATININE-BSD FRML MDRD: 41 ML/MIN/1.73M2
GLUCOSE BLD-MCNC: 104 MG/DL (ref 70–125)
INR PPP: 3.1 (ref 0.9–1.1)
POTASSIUM BLD-SCNC: 4.5 MMOL/L (ref 3.5–5)
SODIUM SERPL-SCNC: 141 MMOL/L (ref 136–145)
TSH SERPL DL<=0.005 MIU/L-ACNC: 15.32 UIU/ML (ref 0.3–5)

## 2018-12-14 ASSESSMENT — MIFFLIN-ST. JEOR: SCORE: 2051.87

## 2018-12-17 ENCOUNTER — AMBULATORY - HEALTHEAST (OUTPATIENT)
Dept: BEHAVIORAL HEALTH | Facility: HOSPITAL | Age: 58
End: 2018-12-17

## 2018-12-17 ENCOUNTER — COMMUNICATION - HEALTHEAST (OUTPATIENT)
Dept: ANTICOAGULATION | Facility: CLINIC | Age: 58
End: 2018-12-17

## 2018-12-18 ENCOUNTER — COMMUNICATION - HEALTHEAST (OUTPATIENT)
Dept: INTERNAL MEDICINE | Facility: CLINIC | Age: 58
End: 2018-12-18

## 2018-12-18 ENCOUNTER — AMBULATORY - HEALTHEAST (OUTPATIENT)
Dept: BEHAVIORAL HEALTH | Facility: HOSPITAL | Age: 58
End: 2018-12-18

## 2018-12-19 ENCOUNTER — COMMUNICATION - HEALTHEAST (OUTPATIENT)
Dept: BEHAVIORAL HEALTH | Facility: CLINIC | Age: 58
End: 2018-12-19

## 2018-12-20 ENCOUNTER — OFFICE VISIT - HEALTHEAST (OUTPATIENT)
Dept: BEHAVIORAL HEALTH | Facility: CLINIC | Age: 58
End: 2018-12-20

## 2018-12-20 DIAGNOSIS — F43.12 CHRONIC POST-TRAUMATIC STRESS DISORDER (PTSD): ICD-10-CM

## 2018-12-20 DIAGNOSIS — F32.1 MODERATE MAJOR DEPRESSION (H): ICD-10-CM

## 2018-12-20 DIAGNOSIS — F60.3 BORDERLINE PERSONALITY DISORDER IN ADULT (H): ICD-10-CM

## 2018-12-20 DIAGNOSIS — F41.1 GENERALIZED ANXIETY DISORDER: ICD-10-CM

## 2018-12-26 ENCOUNTER — COMMUNICATION - HEALTHEAST (OUTPATIENT)
Dept: INTERNAL MEDICINE | Facility: CLINIC | Age: 58
End: 2018-12-26

## 2018-12-26 ENCOUNTER — OFFICE VISIT - HEALTHEAST (OUTPATIENT)
Dept: INTERNAL MEDICINE | Facility: CLINIC | Age: 58
End: 2018-12-26

## 2018-12-26 DIAGNOSIS — R07.89 CHEST WALL PAIN: ICD-10-CM

## 2018-12-28 ENCOUNTER — COMMUNICATION - HEALTHEAST (OUTPATIENT)
Dept: BEHAVIORAL HEALTH | Facility: CLINIC | Age: 58
End: 2018-12-28

## 2018-12-28 DIAGNOSIS — F33.2 SEVERE EPISODE OF RECURRENT MAJOR DEPRESSIVE DISORDER, WITHOUT PSYCHOTIC FEATURES (H): ICD-10-CM

## 2018-12-28 DIAGNOSIS — F17.218 NICOTINE DEPENDENCE, CIGARETTES, WITH OTHER NICOTINE-INDUCED DISORDERS: ICD-10-CM

## 2018-12-28 DIAGNOSIS — F32.9 MAJOR DEPRESSIVE DISORDER WITHOUT PSYCHOTIC FEATURES: ICD-10-CM

## 2018-12-28 DIAGNOSIS — F60.3 BORDERLINE PERSONALITY DISORDER (H): ICD-10-CM

## 2018-12-31 ENCOUNTER — COMMUNICATION - HEALTHEAST (OUTPATIENT)
Dept: NURSING | Facility: CLINIC | Age: 58
End: 2018-12-31

## 2018-12-31 ENCOUNTER — OFFICE VISIT - HEALTHEAST (OUTPATIENT)
Dept: BEHAVIORAL HEALTH | Facility: CLINIC | Age: 58
End: 2018-12-31

## 2018-12-31 ENCOUNTER — COMMUNICATION - HEALTHEAST (OUTPATIENT)
Dept: BEHAVIORAL HEALTH | Facility: CLINIC | Age: 58
End: 2018-12-31

## 2018-12-31 DIAGNOSIS — F32.1 MODERATE MAJOR DEPRESSION (H): ICD-10-CM

## 2018-12-31 DIAGNOSIS — F41.1 GENERALIZED ANXIETY DISORDER: ICD-10-CM

## 2018-12-31 DIAGNOSIS — F43.12 CHRONIC POST-TRAUMATIC STRESS DISORDER (PTSD): ICD-10-CM

## 2019-01-03 ENCOUNTER — OFFICE VISIT - HEALTHEAST (OUTPATIENT)
Dept: BEHAVIORAL HEALTH | Facility: CLINIC | Age: 59
End: 2019-01-03

## 2019-01-03 DIAGNOSIS — F43.12 CHRONIC POST-TRAUMATIC STRESS DISORDER (PTSD): ICD-10-CM

## 2019-01-03 DIAGNOSIS — F41.1 GENERALIZED ANXIETY DISORDER: ICD-10-CM

## 2019-01-03 DIAGNOSIS — F32.1 MODERATE MAJOR DEPRESSION (H): ICD-10-CM

## 2019-01-03 DIAGNOSIS — F60.3 BORDERLINE PERSONALITY DISORDER IN ADULT (H): ICD-10-CM

## 2019-01-04 ENCOUNTER — COMMUNICATION - HEALTHEAST (OUTPATIENT)
Dept: ANTICOAGULATION | Facility: CLINIC | Age: 59
End: 2019-01-04

## 2019-01-08 ENCOUNTER — COMMUNICATION - HEALTHEAST (OUTPATIENT)
Dept: ANTICOAGULATION | Facility: CLINIC | Age: 59
End: 2019-01-08

## 2019-01-08 ENCOUNTER — AMBULATORY - HEALTHEAST (OUTPATIENT)
Dept: LAB | Facility: CLINIC | Age: 59
End: 2019-01-08

## 2019-01-08 DIAGNOSIS — I82.409 DVT (DEEP VENOUS THROMBOSIS) (H): ICD-10-CM

## 2019-01-08 DIAGNOSIS — I26.99 PULMONARY EMBOLISM WITH INFARCTION (H): ICD-10-CM

## 2019-01-08 DIAGNOSIS — I26.99 PULMONARY EMBOLISM (H): ICD-10-CM

## 2019-01-08 LAB — INR PPP: 2.8 (ref 0.9–1.1)

## 2019-01-09 ENCOUNTER — COMMUNICATION - HEALTHEAST (OUTPATIENT)
Dept: INTERNAL MEDICINE | Facility: CLINIC | Age: 59
End: 2019-01-09

## 2019-01-09 DIAGNOSIS — E21.0 PRIMARY HYPERPARATHYROIDISM (H): ICD-10-CM

## 2019-01-14 ENCOUNTER — OFFICE VISIT - HEALTHEAST (OUTPATIENT)
Dept: BEHAVIORAL HEALTH | Facility: CLINIC | Age: 59
End: 2019-01-14

## 2019-01-14 DIAGNOSIS — F43.12 CHRONIC POST-TRAUMATIC STRESS DISORDER (PTSD): ICD-10-CM

## 2019-01-14 DIAGNOSIS — F32.1 MODERATE MAJOR DEPRESSION (H): ICD-10-CM

## 2019-01-14 DIAGNOSIS — F41.1 GENERALIZED ANXIETY DISORDER: ICD-10-CM

## 2019-01-17 ENCOUNTER — OFFICE VISIT - HEALTHEAST (OUTPATIENT)
Dept: BEHAVIORAL HEALTH | Facility: CLINIC | Age: 59
End: 2019-01-17

## 2019-01-17 DIAGNOSIS — F60.3 BORDERLINE PERSONALITY DISORDER IN ADULT (H): ICD-10-CM

## 2019-01-17 DIAGNOSIS — F41.1 GENERALIZED ANXIETY DISORDER: ICD-10-CM

## 2019-01-17 DIAGNOSIS — F32.1 MODERATE MAJOR DEPRESSION (H): ICD-10-CM

## 2019-01-17 DIAGNOSIS — F43.12 CHRONIC POST-TRAUMATIC STRESS DISORDER (PTSD): ICD-10-CM

## 2019-01-18 ENCOUNTER — COMMUNICATION - HEALTHEAST (OUTPATIENT)
Dept: NURSING | Facility: CLINIC | Age: 59
End: 2019-01-18

## 2019-01-21 ENCOUNTER — OFFICE VISIT - HEALTHEAST (OUTPATIENT)
Dept: BEHAVIORAL HEALTH | Facility: CLINIC | Age: 59
End: 2019-01-21

## 2019-01-21 DIAGNOSIS — F32.1 MODERATE MAJOR DEPRESSION (H): ICD-10-CM

## 2019-01-21 DIAGNOSIS — F43.12 CHRONIC POST-TRAUMATIC STRESS DISORDER (PTSD): ICD-10-CM

## 2019-01-21 DIAGNOSIS — F41.1 GENERALIZED ANXIETY DISORDER: ICD-10-CM

## 2019-01-28 ENCOUNTER — AMBULATORY - HEALTHEAST (OUTPATIENT)
Dept: BEHAVIORAL HEALTH | Facility: CLINIC | Age: 59
End: 2019-01-28

## 2019-01-31 ENCOUNTER — OFFICE VISIT - HEALTHEAST (OUTPATIENT)
Dept: BEHAVIORAL HEALTH | Facility: CLINIC | Age: 59
End: 2019-01-31

## 2019-01-31 DIAGNOSIS — F32.1 MODERATE MAJOR DEPRESSION (H): ICD-10-CM

## 2019-01-31 DIAGNOSIS — F60.3 BORDERLINE PERSONALITY DISORDER IN ADULT (H): ICD-10-CM

## 2019-01-31 DIAGNOSIS — F43.12 CHRONIC POST-TRAUMATIC STRESS DISORDER (PTSD): ICD-10-CM

## 2019-01-31 DIAGNOSIS — F41.1 GENERALIZED ANXIETY DISORDER: ICD-10-CM

## 2019-02-06 ENCOUNTER — COMMUNICATION - HEALTHEAST (OUTPATIENT)
Dept: ANTICOAGULATION | Facility: CLINIC | Age: 59
End: 2019-02-06

## 2019-02-06 ENCOUNTER — OFFICE VISIT - HEALTHEAST (OUTPATIENT)
Dept: INTERNAL MEDICINE | Facility: CLINIC | Age: 59
End: 2019-02-06

## 2019-02-06 DIAGNOSIS — E27.8 ADRENAL MASS (H): ICD-10-CM

## 2019-02-06 DIAGNOSIS — I26.99 PULMONARY EMBOLISM WITH INFARCTION (H): ICD-10-CM

## 2019-02-06 DIAGNOSIS — G62.9 POLYNEUROPATHY: ICD-10-CM

## 2019-02-06 DIAGNOSIS — E03.4 HYPOTHYROIDISM DUE TO ACQUIRED ATROPHY OF THYROID: ICD-10-CM

## 2019-02-06 DIAGNOSIS — G40.909 NONINTRACTABLE EPILEPSY WITHOUT STATUS EPILEPTICUS, UNSPECIFIED EPILEPSY TYPE (H): ICD-10-CM

## 2019-02-06 DIAGNOSIS — F25.1 SCHIZOAFFECTIVE DISORDER, DEPRESSIVE TYPE (H): ICD-10-CM

## 2019-02-06 DIAGNOSIS — I26.99 PULMONARY EMBOLISM (H): ICD-10-CM

## 2019-02-06 DIAGNOSIS — C73 MALIGNANT NEOPLASM OF THYROID GLAND (H): ICD-10-CM

## 2019-02-06 DIAGNOSIS — E66.01 MORBID OBESITY WITH BMI OF 45.0-49.9, ADULT (H): ICD-10-CM

## 2019-02-06 DIAGNOSIS — J44.9 CHRONIC OBSTRUCTIVE PULMONARY DISEASE, UNSPECIFIED COPD TYPE (H): ICD-10-CM

## 2019-02-06 DIAGNOSIS — F17.218 NICOTINE DEPENDENCE, CIGARETTES, WITH OTHER NICOTINE-INDUCED DISORDERS: ICD-10-CM

## 2019-02-06 DIAGNOSIS — I82.409 DVT (DEEP VENOUS THROMBOSIS) (H): ICD-10-CM

## 2019-02-06 DIAGNOSIS — I27.82 OTHER CHRONIC PULMONARY EMBOLISM WITHOUT ACUTE COR PULMONALE (H): ICD-10-CM

## 2019-02-06 LAB
INR PPP: 2.8 (ref 0.9–1.1)
TSH SERPL DL<=0.005 MIU/L-ACNC: 4.17 UIU/ML (ref 0.3–5)
VIT B12 SERPL-MCNC: 528 PG/ML (ref 213–816)

## 2019-02-06 ASSESSMENT — MIFFLIN-ST. JEOR: SCORE: 2074.55

## 2019-02-07 ENCOUNTER — COMMUNICATION - HEALTHEAST (OUTPATIENT)
Dept: INTERNAL MEDICINE | Facility: CLINIC | Age: 59
End: 2019-02-07

## 2019-02-09 ENCOUNTER — COMMUNICATION - HEALTHEAST (OUTPATIENT)
Dept: INTERNAL MEDICINE | Facility: CLINIC | Age: 59
End: 2019-02-09

## 2019-02-11 ENCOUNTER — COMMUNICATION - HEALTHEAST (OUTPATIENT)
Dept: BEHAVIORAL HEALTH | Facility: CLINIC | Age: 59
End: 2019-02-11

## 2019-02-11 DIAGNOSIS — F33.2 SEVERE EPISODE OF RECURRENT MAJOR DEPRESSIVE DISORDER, WITHOUT PSYCHOTIC FEATURES (H): ICD-10-CM

## 2019-02-11 DIAGNOSIS — F17.218 NICOTINE DEPENDENCE, CIGARETTES, WITH OTHER NICOTINE-INDUCED DISORDERS: ICD-10-CM

## 2019-02-18 ENCOUNTER — OFFICE VISIT - HEALTHEAST (OUTPATIENT)
Dept: BEHAVIORAL HEALTH | Facility: CLINIC | Age: 59
End: 2019-02-18

## 2019-02-18 DIAGNOSIS — F41.1 GENERALIZED ANXIETY DISORDER: ICD-10-CM

## 2019-02-18 DIAGNOSIS — F43.12 CHRONIC POST-TRAUMATIC STRESS DISORDER (PTSD): ICD-10-CM

## 2019-02-18 DIAGNOSIS — F32.1 MODERATE MAJOR DEPRESSION (H): ICD-10-CM

## 2019-02-25 ENCOUNTER — COMMUNICATION - HEALTHEAST (OUTPATIENT)
Dept: BEHAVIORAL HEALTH | Facility: CLINIC | Age: 59
End: 2019-02-25

## 2019-02-25 ENCOUNTER — COMMUNICATION - HEALTHEAST (OUTPATIENT)
Dept: INTERNAL MEDICINE | Facility: CLINIC | Age: 59
End: 2019-02-25

## 2019-02-25 ENCOUNTER — RECORDS - HEALTHEAST (OUTPATIENT)
Dept: ADMINISTRATIVE | Facility: OTHER | Age: 59
End: 2019-02-25

## 2019-02-26 ENCOUNTER — OFFICE VISIT - HEALTHEAST (OUTPATIENT)
Dept: BEHAVIORAL HEALTH | Facility: CLINIC | Age: 59
End: 2019-02-26

## 2019-02-26 ENCOUNTER — COMMUNICATION - HEALTHEAST (OUTPATIENT)
Dept: BEHAVIORAL HEALTH | Facility: CLINIC | Age: 59
End: 2019-02-26

## 2019-02-26 DIAGNOSIS — G47.33 OSA (OBSTRUCTIVE SLEEP APNEA): ICD-10-CM

## 2019-02-26 DIAGNOSIS — F17.218 NICOTINE DEPENDENCE, CIGARETTES, WITH OTHER NICOTINE-INDUCED DISORDERS: ICD-10-CM

## 2019-02-26 DIAGNOSIS — F32.9 MAJOR DEPRESSIVE DISORDER WITHOUT PSYCHOTIC FEATURES: ICD-10-CM

## 2019-02-26 DIAGNOSIS — F33.2 SEVERE EPISODE OF RECURRENT MAJOR DEPRESSIVE DISORDER, WITHOUT PSYCHOTIC FEATURES (H): ICD-10-CM

## 2019-02-26 DIAGNOSIS — F60.3 BORDERLINE PERSONALITY DISORDER (H): ICD-10-CM

## 2019-02-26 DIAGNOSIS — F32.1 MODERATE MAJOR DEPRESSION (H): ICD-10-CM

## 2019-02-26 DIAGNOSIS — F25.1 SCHIZOAFFECTIVE DISORDER, DEPRESSIVE TYPE (H): ICD-10-CM

## 2019-02-26 ASSESSMENT — MIFFLIN-ST. JEOR: SCORE: 2088.16

## 2019-02-27 ENCOUNTER — COMMUNICATION - HEALTHEAST (OUTPATIENT)
Dept: NURSING | Facility: CLINIC | Age: 59
End: 2019-02-27

## 2019-03-04 ENCOUNTER — AMBULATORY - HEALTHEAST (OUTPATIENT)
Dept: BEHAVIORAL HEALTH | Facility: CLINIC | Age: 59
End: 2019-03-04

## 2019-03-11 ENCOUNTER — COMMUNICATION - HEALTHEAST (OUTPATIENT)
Dept: BEHAVIORAL HEALTH | Facility: CLINIC | Age: 59
End: 2019-03-11

## 2019-03-11 ENCOUNTER — OFFICE VISIT - HEALTHEAST (OUTPATIENT)
Dept: BEHAVIORAL HEALTH | Facility: CLINIC | Age: 59
End: 2019-03-11

## 2019-03-11 DIAGNOSIS — F32.1 MODERATE MAJOR DEPRESSION (H): ICD-10-CM

## 2019-03-11 DIAGNOSIS — F43.12 CHRONIC POST-TRAUMATIC STRESS DISORDER (PTSD): ICD-10-CM

## 2019-03-12 ENCOUNTER — COMMUNICATION - HEALTHEAST (OUTPATIENT)
Dept: ANTICOAGULATION | Facility: CLINIC | Age: 59
End: 2019-03-12

## 2019-03-12 ENCOUNTER — AMBULATORY - HEALTHEAST (OUTPATIENT)
Dept: BEHAVIORAL HEALTH | Facility: CLINIC | Age: 59
End: 2019-03-12

## 2019-03-12 DIAGNOSIS — I26.99 PULMONARY EMBOLISM WITH INFARCTION (H): ICD-10-CM

## 2019-03-12 DIAGNOSIS — K59.09 OTHER CONSTIPATION: ICD-10-CM

## 2019-03-15 ENCOUNTER — COMMUNICATION - HEALTHEAST (OUTPATIENT)
Dept: INTERNAL MEDICINE | Facility: CLINIC | Age: 59
End: 2019-03-15

## 2019-03-15 DIAGNOSIS — I26.99 PULMONARY EMBOLISM WITH INFARCTION (H): ICD-10-CM

## 2019-03-18 ENCOUNTER — AMBULATORY - HEALTHEAST (OUTPATIENT)
Dept: BEHAVIORAL HEALTH | Facility: CLINIC | Age: 59
End: 2019-03-18

## 2019-03-20 ENCOUNTER — OFFICE VISIT - HEALTHEAST (OUTPATIENT)
Dept: INTERNAL MEDICINE | Facility: CLINIC | Age: 59
End: 2019-03-20

## 2019-03-20 DIAGNOSIS — E66.01 MORBID OBESITY WITH BMI OF 45.0-49.9, ADULT (H): ICD-10-CM

## 2019-03-20 DIAGNOSIS — S92.354D CLOSED NONDISPLACED FRACTURE OF FIFTH METATARSAL BONE OF RIGHT FOOT WITH ROUTINE HEALING, SUBSEQUENT ENCOUNTER: ICD-10-CM

## 2019-03-20 DIAGNOSIS — I10 ESSENTIAL HYPERTENSION WITH GOAL BLOOD PRESSURE LESS THAN 140/90: ICD-10-CM

## 2019-03-20 DIAGNOSIS — I26.99 PULMONARY EMBOLISM WITH INFARCTION (H): ICD-10-CM
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
## 2019-03-20 ASSESSMENT — MIFFLIN-ST. JEOR: SCORE: 2074.55

## 2019-03-23 ENCOUNTER — COMMUNICATION - HEALTHEAST (OUTPATIENT)
Dept: INTERNAL MEDICINE | Facility: CLINIC | Age: 59
End: 2019-03-23

## 2019-03-23 DIAGNOSIS — E78.2 MIXED HYPERLIPIDEMIA: ICD-10-CM

## 2019-03-24 ENCOUNTER — COMMUNICATION - HEALTHEAST (OUTPATIENT)
Dept: INTERNAL MEDICINE | Facility: CLINIC | Age: 59
End: 2019-03-24

## 2019-03-24 DIAGNOSIS — E78.2 MIXED HYPERLIPIDEMIA: ICD-10-CM

## 2019-03-28 ENCOUNTER — COMMUNICATION - HEALTHEAST (OUTPATIENT)
Dept: ANTICOAGULATION | Facility: CLINIC | Age: 59
End: 2019-03-28

## 2019-04-03 ENCOUNTER — COMMUNICATION - HEALTHEAST (OUTPATIENT)
Dept: INTERNAL MEDICINE | Facility: CLINIC | Age: 59
End: 2019-04-03

## 2019-04-03 DIAGNOSIS — E55.9 VITAMIN D DEFICIENCY: ICD-10-CM

## 2019-04-03 DIAGNOSIS — N32.81 OAB (OVERACTIVE BLADDER): ICD-10-CM

## 2019-04-11 ENCOUNTER — COMMUNICATION - HEALTHEAST (OUTPATIENT)
Dept: NURSING | Facility: CLINIC | Age: 59
End: 2019-04-11

## 2019-04-12 ENCOUNTER — AMBULATORY - HEALTHEAST (OUTPATIENT)
Dept: BEHAVIORAL HEALTH | Facility: CLINIC | Age: 59
End: 2019-04-12

## 2019-04-15 ENCOUNTER — COMMUNICATION - HEALTHEAST (OUTPATIENT)
Dept: INTERNAL MEDICINE | Facility: CLINIC | Age: 59
End: 2019-04-15

## 2019-04-15 DIAGNOSIS — K21.9 GASTROESOPHAGEAL REFLUX DISEASE WITHOUT ESOPHAGITIS: ICD-10-CM

## 2019-04-16 ENCOUNTER — COMMUNICATION - HEALTHEAST (OUTPATIENT)
Dept: BEHAVIORAL HEALTH | Facility: CLINIC | Age: 59
End: 2019-04-16

## 2019-04-16 ENCOUNTER — RECORDS - HEALTHEAST (OUTPATIENT)
Dept: ADMINISTRATIVE | Facility: OTHER | Age: 59
End: 2019-04-16

## 2019-04-23 ENCOUNTER — COMMUNICATION - HEALTHEAST (OUTPATIENT)
Dept: NURSING | Facility: CLINIC | Age: 59
End: 2019-04-23

## 2019-04-24 ENCOUNTER — COMMUNICATION - HEALTHEAST (OUTPATIENT)
Dept: BEHAVIORAL HEALTH | Facility: CLINIC | Age: 59
End: 2019-04-24

## 2019-04-25 ENCOUNTER — OFFICE VISIT - HEALTHEAST (OUTPATIENT)
Dept: BEHAVIORAL HEALTH | Facility: CLINIC | Age: 59
End: 2019-04-25

## 2019-04-25 ENCOUNTER — AMBULATORY - HEALTHEAST (OUTPATIENT)
Dept: BEHAVIORAL HEALTH | Facility: CLINIC | Age: 59
End: 2019-04-25

## 2019-04-25 DIAGNOSIS — F43.12 CHRONIC POST-TRAUMATIC STRESS DISORDER (PTSD): ICD-10-CM

## 2019-04-25 DIAGNOSIS — R41.89 COGNITIVE DEFICITS: ICD-10-CM

## 2019-04-25 DIAGNOSIS — F41.1 GENERALIZED ANXIETY DISORDER: ICD-10-CM

## 2019-04-25 DIAGNOSIS — F60.3 BORDERLINE PERSONALITY DISORDER (H): ICD-10-CM

## 2019-04-25 DIAGNOSIS — F32.1 MODERATE MAJOR DEPRESSION (H): ICD-10-CM

## 2019-04-26 ENCOUNTER — COMMUNICATION - HEALTHEAST (OUTPATIENT)
Dept: ANTICOAGULATION | Facility: CLINIC | Age: 59
End: 2019-04-26

## 2019-04-26 ENCOUNTER — AMBULATORY - HEALTHEAST (OUTPATIENT)
Dept: LAB | Facility: CLINIC | Age: 59
End: 2019-04-26

## 2019-04-26 DIAGNOSIS — I26.99 PULMONARY EMBOLISM (H): ICD-10-CM

## 2019-04-26 DIAGNOSIS — I26.99 PULMONARY EMBOLISM WITH INFARCTION (H): ICD-10-CM

## 2019-04-26 DIAGNOSIS — I82.409 DVT (DEEP VENOUS THROMBOSIS) (H): ICD-10-CM

## 2019-04-26 LAB — INR PPP: 2 (ref 0.9–1.1)

## 2019-04-30 ENCOUNTER — AMBULATORY - HEALTHEAST (OUTPATIENT)
Dept: CARE COORDINATION | Facility: CLINIC | Age: 59
End: 2019-04-30

## 2019-05-01 ENCOUNTER — COMMUNICATION - HEALTHEAST (OUTPATIENT)
Dept: INTERNAL MEDICINE | Facility: CLINIC | Age: 59
End: 2019-05-01

## 2019-05-07 ENCOUNTER — OFFICE VISIT - HEALTHEAST (OUTPATIENT)
Dept: BEHAVIORAL HEALTH | Facility: CLINIC | Age: 59
End: 2019-05-07

## 2019-05-07 DIAGNOSIS — R63.0 APPETITE LOSS: ICD-10-CM

## 2019-05-07 DIAGNOSIS — G47.30 SLEEP APNEA, UNSPECIFIED TYPE: ICD-10-CM

## 2019-05-07 ASSESSMENT — MIFFLIN-ST. JEOR: SCORE: 2115.37

## 2019-05-08 ENCOUNTER — COMMUNICATION - HEALTHEAST (OUTPATIENT)
Dept: INTERNAL MEDICINE | Facility: CLINIC | Age: 59
End: 2019-05-08

## 2019-05-09 ENCOUNTER — OFFICE VISIT - HEALTHEAST (OUTPATIENT)
Dept: BEHAVIORAL HEALTH | Facility: CLINIC | Age: 59
End: 2019-05-09

## 2019-05-09 DIAGNOSIS — F41.1 GENERALIZED ANXIETY DISORDER: ICD-10-CM

## 2019-05-09 DIAGNOSIS — F43.12 CHRONIC POST-TRAUMATIC STRESS DISORDER (PTSD): ICD-10-CM

## 2019-05-09 DIAGNOSIS — R41.89 COGNITIVE DEFICITS: ICD-10-CM

## 2019-05-09 DIAGNOSIS — F60.3 BORDERLINE PERSONALITY DISORDER (H): ICD-10-CM

## 2019-05-09 DIAGNOSIS — F33.1 DEPRESSION, MAJOR, RECURRENT, MODERATE (H): ICD-10-CM

## 2019-05-13 ENCOUNTER — AMBULATORY - HEALTHEAST (OUTPATIENT)
Dept: LAB | Facility: CLINIC | Age: 59
End: 2019-05-13

## 2019-05-13 ENCOUNTER — COMMUNICATION - HEALTHEAST (OUTPATIENT)
Dept: ANTICOAGULATION | Facility: CLINIC | Age: 59
End: 2019-05-13

## 2019-05-13 DIAGNOSIS — I26.99 PULMONARY EMBOLISM (H): ICD-10-CM

## 2019-05-13 DIAGNOSIS — I82.409 DVT (DEEP VENOUS THROMBOSIS) (H): ICD-10-CM

## 2019-05-13 DIAGNOSIS — I26.99 PULMONARY EMBOLISM WITH INFARCTION (H): ICD-10-CM

## 2019-05-13 LAB — INR PPP: 2.1 (ref 0.9–1.1)

## 2019-05-14 ENCOUNTER — COMMUNICATION - HEALTHEAST (OUTPATIENT)
Dept: NURSING | Facility: CLINIC | Age: 59
End: 2019-05-14

## 2019-05-15 ENCOUNTER — COMMUNICATION - HEALTHEAST (OUTPATIENT)
Dept: INTERNAL MEDICINE | Facility: CLINIC | Age: 59
End: 2019-05-15

## 2019-05-20 ENCOUNTER — COMMUNICATION - HEALTHEAST (OUTPATIENT)
Dept: BEHAVIORAL HEALTH | Facility: CLINIC | Age: 59
End: 2019-05-20

## 2019-05-20 DIAGNOSIS — F33.2 SEVERE EPISODE OF RECURRENT MAJOR DEPRESSIVE DISORDER, WITHOUT PSYCHOTIC FEATURES (H): ICD-10-CM

## 2019-05-20 DIAGNOSIS — F17.218 NICOTINE DEPENDENCE, CIGARETTES, WITH OTHER NICOTINE-INDUCED DISORDERS: ICD-10-CM

## 2019-05-23 ENCOUNTER — COMMUNICATION - HEALTHEAST (OUTPATIENT)
Dept: NURSING | Facility: CLINIC | Age: 59
End: 2019-05-23

## 2019-05-23 ENCOUNTER — AMBULATORY - HEALTHEAST (OUTPATIENT)
Dept: BEHAVIORAL HEALTH | Facility: CLINIC | Age: 59
End: 2019-05-23

## 2019-05-26 ENCOUNTER — COMMUNICATION - HEALTHEAST (OUTPATIENT)
Dept: INTERNAL MEDICINE | Facility: CLINIC | Age: 59
End: 2019-05-26

## 2019-05-26 DIAGNOSIS — E78.2 MIXED HYPERLIPIDEMIA: ICD-10-CM

## 2019-05-26 DIAGNOSIS — E21.0 PRIMARY HYPERPARATHYROIDISM (H): ICD-10-CM

## 2019-05-28 ENCOUNTER — COMMUNICATION - HEALTHEAST (OUTPATIENT)
Dept: BEHAVIORAL HEALTH | Facility: CLINIC | Age: 59
End: 2019-05-28

## 2019-05-28 ENCOUNTER — RECORDS - HEALTHEAST (OUTPATIENT)
Dept: ADMINISTRATIVE | Facility: OTHER | Age: 59
End: 2019-05-28

## 2019-05-28 DIAGNOSIS — F33.2 SEVERE EPISODE OF RECURRENT MAJOR DEPRESSIVE DISORDER, WITHOUT PSYCHOTIC FEATURES (H): ICD-10-CM

## 2019-05-28 DIAGNOSIS — F17.218 NICOTINE DEPENDENCE, CIGARETTES, WITH OTHER NICOTINE-INDUCED DISORDERS: ICD-10-CM

## 2019-05-28 DIAGNOSIS — F25.1 SCHIZOAFFECTIVE DISORDER, DEPRESSIVE TYPE (H): ICD-10-CM

## 2019-05-29 ENCOUNTER — COMMUNICATION - HEALTHEAST (OUTPATIENT)
Dept: INTERNAL MEDICINE | Facility: CLINIC | Age: 59
End: 2019-05-29

## 2019-06-13 ENCOUNTER — COMMUNICATION - HEALTHEAST (OUTPATIENT)
Dept: BEHAVIORAL HEALTH | Facility: CLINIC | Age: 59
End: 2019-06-13

## 2019-06-13 DIAGNOSIS — R63.0 APPETITE LOSS: ICD-10-CM

## 2019-06-18 ENCOUNTER — COMMUNICATION - HEALTHEAST (OUTPATIENT)
Dept: ANTICOAGULATION | Facility: CLINIC | Age: 59
End: 2019-06-18

## 2019-06-19 ENCOUNTER — COMMUNICATION - HEALTHEAST (OUTPATIENT)
Dept: BEHAVIORAL HEALTH | Facility: CLINIC | Age: 59
End: 2019-06-19

## 2019-06-20 ENCOUNTER — RECORDS - HEALTHEAST (OUTPATIENT)
Dept: ADMINISTRATIVE | Facility: OTHER | Age: 59
End: 2019-06-20

## 2019-07-02 ENCOUNTER — AMBULATORY - HEALTHEAST (OUTPATIENT)
Dept: BEHAVIORAL HEALTH | Facility: CLINIC | Age: 59
End: 2019-07-02

## 2019-07-02 DIAGNOSIS — R63.0 APPETITE LOSS: ICD-10-CM

## 2019-07-12 ENCOUNTER — OFFICE VISIT - HEALTHEAST (OUTPATIENT)
Dept: INTERNAL MEDICINE | Facility: CLINIC | Age: 59
End: 2019-07-12

## 2019-07-12 ENCOUNTER — COMMUNICATION - HEALTHEAST (OUTPATIENT)
Dept: BEHAVIORAL HEALTH | Facility: CLINIC | Age: 59
End: 2019-07-12

## 2019-07-12 ENCOUNTER — COMMUNICATION - HEALTHEAST (OUTPATIENT)
Dept: ANTICOAGULATION | Facility: CLINIC | Age: 59
End: 2019-07-12

## 2019-07-12 DIAGNOSIS — F17.218 NICOTINE DEPENDENCE, CIGARETTES, WITH OTHER NICOTINE-INDUCED DISORDERS: ICD-10-CM

## 2019-07-12 DIAGNOSIS — I26.99 PULMONARY EMBOLISM WITH INFARCTION (H): ICD-10-CM

## 2019-07-12 DIAGNOSIS — R79.9 ABNORMAL FINDING OF BLOOD CHEMISTRY: ICD-10-CM

## 2019-07-12 DIAGNOSIS — G62.0 POLYNEUROPATHY DUE TO DRUG (H): ICD-10-CM

## 2019-07-12 DIAGNOSIS — G40.909 NONINTRACTABLE EPILEPSY WITHOUT STATUS EPILEPTICUS, UNSPECIFIED EPILEPSY TYPE (H): ICD-10-CM

## 2019-07-12 DIAGNOSIS — E66.01 MORBID OBESITY WITH BMI OF 45.0-49.9, ADULT (H): ICD-10-CM

## 2019-07-12 DIAGNOSIS — E03.4 HYPOTHYROIDISM DUE TO ACQUIRED ATROPHY OF THYROID: ICD-10-CM

## 2019-07-12 DIAGNOSIS — F33.2 SEVERE EPISODE OF RECURRENT MAJOR DEPRESSIVE DISORDER, WITHOUT PSYCHOTIC FEATURES (H): ICD-10-CM

## 2019-07-12 DIAGNOSIS — F25.1 SCHIZOAFFECTIVE DISORDER, DEPRESSIVE TYPE (H): ICD-10-CM

## 2019-07-12 DIAGNOSIS — I10 ESSENTIAL HYPERTENSION WITH GOAL BLOOD PRESSURE LESS THAN 140/90: ICD-10-CM

## 2019-07-12 DIAGNOSIS — Z11.4 SCREENING FOR HIV (HUMAN IMMUNODEFICIENCY VIRUS): ICD-10-CM

## 2019-07-12 DIAGNOSIS — I82.409 DVT (DEEP VENOUS THROMBOSIS) (H): ICD-10-CM

## 2019-07-12 DIAGNOSIS — I26.99 PULMONARY EMBOLISM (H): ICD-10-CM

## 2019-07-12 LAB
ALBUMIN SERPL-MCNC: 3.3 G/DL (ref 3.5–5)
ALP SERPL-CCNC: 103 U/L (ref 45–120)
ALT SERPL W P-5'-P-CCNC: 36 U/L (ref 0–45)
ANION GAP SERPL CALCULATED.3IONS-SCNC: 9 MMOL/L (ref 5–18)
AST SERPL W P-5'-P-CCNC: 27 U/L (ref 0–40)
BILIRUB SERPL-MCNC: 0.2 MG/DL (ref 0–1)
BUN SERPL-MCNC: 11 MG/DL (ref 8–22)
CALCIUM SERPL-MCNC: 8.8 MG/DL (ref 8.5–10.5)
CHLORIDE BLD-SCNC: 106 MMOL/L (ref 98–107)
CO2 SERPL-SCNC: 21 MMOL/L (ref 22–31)
CREAT SERPL-MCNC: 1.31 MG/DL (ref 0.6–1.1)
ERYTHROCYTE [DISTWIDTH] IN BLOOD BY AUTOMATED COUNT: 11.9 % (ref 11–14.5)
GFR SERPL CREATININE-BSD FRML MDRD: 42 ML/MIN/1.73M2
GLUCOSE BLD-MCNC: 103 MG/DL (ref 70–125)
HBA1C MFR BLD: 5.6 % (ref 3.5–6)
HCT VFR BLD AUTO: 41.7 % (ref 35–47)
HGB BLD-MCNC: 14 G/DL (ref 12–16)
HIV 1+2 AB+HIV1 P24 AG SERPL QL IA: NEGATIVE
INR PPP: 1.8 (ref 0.9–1.1)
MCH RBC QN AUTO: 29.3 PG (ref 27–34)
MCHC RBC AUTO-ENTMCNC: 33.7 G/DL (ref 32–36)
MCV RBC AUTO: 87 FL (ref 80–100)
PLATELET # BLD AUTO: 303 THOU/UL (ref 140–440)
PMV BLD AUTO: 7.2 FL (ref 7–10)
POTASSIUM BLD-SCNC: 4 MMOL/L (ref 3.5–5)
PROT SERPL-MCNC: 6.5 G/DL (ref 6–8)
RBC # BLD AUTO: 4.79 MILL/UL (ref 3.8–5.4)
SODIUM SERPL-SCNC: 136 MMOL/L (ref 136–145)
T4 FREE SERPL-MCNC: 0.9 NG/DL (ref 0.7–1.8)
TSH SERPL DL<=0.005 MIU/L-ACNC: 9.19 UIU/ML (ref 0.3–5)
WBC: 4 THOU/UL (ref 4–11)

## 2019-07-12 ASSESSMENT — MIFFLIN-ST. JEOR: SCORE: 2201.56

## 2019-07-13 LAB — T PALLIDUM AB SER QL: NEGATIVE

## 2019-07-15 ENCOUNTER — COMMUNICATION - HEALTHEAST (OUTPATIENT)
Dept: BEHAVIORAL HEALTH | Facility: CLINIC | Age: 59
End: 2019-07-15

## 2019-07-15 DIAGNOSIS — K59.09 OTHER CONSTIPATION: ICD-10-CM

## 2019-07-16 LAB
ALBUMIN PERCENT: 54.9 % (ref 51–67)
ALBUMIN SERPL ELPH-MCNC: 3.6 G/DL (ref 3.2–4.7)
ALPHA 1 PERCENT: 3.2 % (ref 2–4)
ALPHA 2 PERCENT: 15.4 % (ref 5–13)
ALPHA1 GLOB SERPL ELPH-MCNC: 0.2 G/DL (ref 0.1–0.3)
ALPHA2 GLOB SERPL ELPH-MCNC: 1 G/DL (ref 0.4–0.9)
B-GLOBULIN SERPL ELPH-MCNC: 0.9 G/DL (ref 0.7–1.2)
BETA PERCENT: 13.6 % (ref 10–17)
GAMMA GLOB SERPL ELPH-MCNC: 0.8 G/DL (ref 0.6–1.4)
GAMMA GLOBULIN PERCENT: 12.9 % (ref 9–20)
PATH ICD:: ABNORMAL
PROT PATTERN SERPL ELPH-IMP: ABNORMAL
PROT SERPL-MCNC: 6.5 G/DL (ref 6–8)
REVIEWING PATHOLOGIST: ABNORMAL

## 2019-07-22 ENCOUNTER — COMMUNICATION - HEALTHEAST (OUTPATIENT)
Dept: INTERNAL MEDICINE | Facility: CLINIC | Age: 59
End: 2019-07-22

## 2019-07-23 ENCOUNTER — AMBULATORY - HEALTHEAST (OUTPATIENT)
Dept: INTERNAL MEDICINE | Facility: CLINIC | Age: 59
End: 2019-07-23

## 2019-07-23 DIAGNOSIS — E03.4 HYPOTHYROIDISM DUE TO ACQUIRED ATROPHY OF THYROID: ICD-10-CM

## 2019-07-29 ENCOUNTER — COMMUNICATION - HEALTHEAST (OUTPATIENT)
Dept: ANTICOAGULATION | Facility: CLINIC | Age: 59
End: 2019-07-29

## 2019-08-05 ENCOUNTER — COMMUNICATION - HEALTHEAST (OUTPATIENT)
Dept: BEHAVIORAL HEALTH | Facility: CLINIC | Age: 59
End: 2019-08-05

## 2019-08-05 DIAGNOSIS — K59.09 OTHER CONSTIPATION: ICD-10-CM

## 2019-08-06 ENCOUNTER — COMMUNICATION - HEALTHEAST (OUTPATIENT)
Dept: BEHAVIORAL HEALTH | Facility: CLINIC | Age: 59
End: 2019-08-06

## 2019-08-06 DIAGNOSIS — K59.09 OTHER CONSTIPATION: ICD-10-CM

## 2019-08-11 ENCOUNTER — RECORDS - HEALTHEAST (OUTPATIENT)
Dept: ADMINISTRATIVE | Facility: OTHER | Age: 59
End: 2019-08-11

## 2019-08-12 ENCOUNTER — COMMUNICATION - HEALTHEAST (OUTPATIENT)
Dept: BEHAVIORAL HEALTH | Facility: CLINIC | Age: 59
End: 2019-08-12

## 2019-08-14 ENCOUNTER — COMMUNICATION - HEALTHEAST (OUTPATIENT)
Dept: BEHAVIORAL HEALTH | Facility: CLINIC | Age: 59
End: 2019-08-14

## 2019-08-14 DIAGNOSIS — K59.09 OTHER CONSTIPATION: ICD-10-CM

## 2019-08-20 ENCOUNTER — OFFICE VISIT - HEALTHEAST (OUTPATIENT)
Dept: BEHAVIORAL HEALTH | Facility: CLINIC | Age: 59
End: 2019-08-20

## 2019-08-20 DIAGNOSIS — F17.218 NICOTINE DEPENDENCE, CIGARETTES, WITH OTHER NICOTINE-INDUCED DISORDERS: ICD-10-CM

## 2019-08-20 DIAGNOSIS — F25.1 SCHIZOAFFECTIVE DISORDER, DEPRESSIVE TYPE (H): ICD-10-CM

## 2019-08-20 DIAGNOSIS — F60.3 BORDERLINE PERSONALITY DISORDER (H): ICD-10-CM

## 2019-08-20 DIAGNOSIS — F32.9 MAJOR DEPRESSIVE DISORDER WITHOUT PSYCHOTIC FEATURES: ICD-10-CM

## 2019-08-20 DIAGNOSIS — F33.2 SEVERE EPISODE OF RECURRENT MAJOR DEPRESSIVE DISORDER, WITHOUT PSYCHOTIC FEATURES (H): ICD-10-CM

## 2019-08-20 DIAGNOSIS — R63.0 APPETITE LOSS: ICD-10-CM

## 2019-08-20 DIAGNOSIS — K59.09 OTHER CONSTIPATION: ICD-10-CM

## 2019-08-20 ASSESSMENT — MIFFLIN-ST. JEOR: SCORE: 2183.41

## 2019-08-21 ENCOUNTER — COMMUNICATION - HEALTHEAST (OUTPATIENT)
Dept: INTERNAL MEDICINE | Facility: CLINIC | Age: 59
End: 2019-08-21

## 2019-08-22 ENCOUNTER — OFFICE VISIT - HEALTHEAST (OUTPATIENT)
Dept: BEHAVIORAL HEALTH | Facility: CLINIC | Age: 59
End: 2019-08-22

## 2019-08-22 DIAGNOSIS — F41.1 GENERALIZED ANXIETY DISORDER: ICD-10-CM

## 2019-08-22 DIAGNOSIS — R41.89 COGNITIVE DEFICITS: ICD-10-CM

## 2019-08-22 DIAGNOSIS — F60.3 BORDERLINE PERSONALITY DISORDER (H): ICD-10-CM

## 2019-08-22 DIAGNOSIS — F33.1 DEPRESSION, MAJOR, RECURRENT, MODERATE (H): ICD-10-CM

## 2019-08-22 DIAGNOSIS — F43.12 CHRONIC POST-TRAUMATIC STRESS DISORDER (PTSD): ICD-10-CM

## 2019-09-10 ENCOUNTER — COMMUNICATION - HEALTHEAST (OUTPATIENT)
Dept: ANTICOAGULATION | Facility: CLINIC | Age: 59
End: 2019-09-10

## 2019-09-10 DIAGNOSIS — I26.99 PULMONARY EMBOLISM WITH INFARCTION (H): ICD-10-CM

## 2019-09-18 ENCOUNTER — COMMUNICATION - HEALTHEAST (OUTPATIENT)
Dept: INTERNAL MEDICINE | Facility: CLINIC | Age: 59
End: 2019-09-18

## 2019-09-18 DIAGNOSIS — Z79.01 LONG TERM (CURRENT) USE OF ANTICOAGULANTS: ICD-10-CM

## 2019-09-18 DIAGNOSIS — I26.99 PULMONARY EMBOLISM WITH INFARCTION (H): ICD-10-CM

## 2019-09-19 ENCOUNTER — AMBULATORY - HEALTHEAST (OUTPATIENT)
Dept: BEHAVIORAL HEALTH | Facility: CLINIC | Age: 59
End: 2019-09-19

## 2019-09-19 ENCOUNTER — OFFICE VISIT - HEALTHEAST (OUTPATIENT)
Dept: BEHAVIORAL HEALTH | Facility: CLINIC | Age: 59
End: 2019-09-19

## 2019-09-19 DIAGNOSIS — F60.3 BORDERLINE PERSONALITY DISORDER (H): ICD-10-CM

## 2019-09-19 DIAGNOSIS — R41.89 COGNITIVE DEFICITS: ICD-10-CM

## 2019-09-19 DIAGNOSIS — F41.1 GENERALIZED ANXIETY DISORDER: ICD-10-CM

## 2019-09-19 DIAGNOSIS — F33.2 SEVERE EPISODE OF RECURRENT MAJOR DEPRESSIVE DISORDER, WITHOUT PSYCHOTIC FEATURES (H): ICD-10-CM

## 2019-09-19 DIAGNOSIS — F33.1 DEPRESSION, MAJOR, RECURRENT, MODERATE (H): ICD-10-CM

## 2019-09-19 DIAGNOSIS — F43.12 CHRONIC POST-TRAUMATIC STRESS DISORDER (PTSD): ICD-10-CM

## 2019-09-19 DIAGNOSIS — F17.218 NICOTINE DEPENDENCE, CIGARETTES, WITH OTHER NICOTINE-INDUCED DISORDERS: ICD-10-CM

## 2019-10-01 ENCOUNTER — COMMUNICATION - HEALTHEAST (OUTPATIENT)
Dept: ANTICOAGULATION | Facility: CLINIC | Age: 59
End: 2019-10-01

## 2019-10-09 ENCOUNTER — COMMUNICATION - HEALTHEAST (OUTPATIENT)
Dept: BEHAVIORAL HEALTH | Facility: CLINIC | Age: 59
End: 2019-10-09

## 2019-10-11 ENCOUNTER — COMMUNICATION - HEALTHEAST (OUTPATIENT)
Dept: ANTICOAGULATION | Facility: CLINIC | Age: 59
End: 2019-10-11

## 2019-10-11 DIAGNOSIS — I26.99 PULMONARY EMBOLISM WITH INFARCTION (H): ICD-10-CM

## 2019-10-14 ENCOUNTER — COMMUNICATION - HEALTHEAST (OUTPATIENT)
Dept: BEHAVIORAL HEALTH | Facility: CLINIC | Age: 59
End: 2019-10-14

## 2019-10-22 ENCOUNTER — OFFICE VISIT - HEALTHEAST (OUTPATIENT)
Dept: BEHAVIORAL HEALTH | Facility: CLINIC | Age: 59
End: 2019-10-22

## 2019-10-23 ENCOUNTER — COMMUNICATION - HEALTHEAST (OUTPATIENT)
Dept: INTERNAL MEDICINE | Facility: CLINIC | Age: 59
End: 2019-10-23

## 2019-10-23 ENCOUNTER — COMMUNICATION - HEALTHEAST (OUTPATIENT)
Dept: BEHAVIORAL HEALTH | Facility: CLINIC | Age: 59
End: 2019-10-23

## 2019-10-23 DIAGNOSIS — K59.09 OTHER CONSTIPATION: ICD-10-CM

## 2019-10-23 DIAGNOSIS — I26.99 PULMONARY EMBOLISM WITH INFARCTION (H): ICD-10-CM

## 2019-10-28 ENCOUNTER — COMMUNICATION - HEALTHEAST (OUTPATIENT)
Dept: INTERNAL MEDICINE | Facility: CLINIC | Age: 59
End: 2019-10-28

## 2019-10-28 DIAGNOSIS — K59.09 OTHER CONSTIPATION: ICD-10-CM

## 2019-11-05 ENCOUNTER — OFFICE VISIT - HEALTHEAST (OUTPATIENT)
Dept: BEHAVIORAL HEALTH | Facility: CLINIC | Age: 59
End: 2019-11-05

## 2019-11-05 DIAGNOSIS — F33.1 DEPRESSION, MAJOR, RECURRENT, MODERATE (H): ICD-10-CM

## 2019-11-05 DIAGNOSIS — F43.12 CHRONIC POST-TRAUMATIC STRESS DISORDER (PTSD): ICD-10-CM

## 2019-11-05 DIAGNOSIS — R41.89 COGNITIVE DEFICITS: ICD-10-CM

## 2019-11-05 DIAGNOSIS — F41.1 GENERALIZED ANXIETY DISORDER: ICD-10-CM

## 2019-11-05 DIAGNOSIS — F60.3 BORDERLINE PERSONALITY DISORDER (H): ICD-10-CM

## 2019-11-06 ENCOUNTER — AMBULATORY - HEALTHEAST (OUTPATIENT)
Dept: LAB | Facility: CLINIC | Age: 59
End: 2019-11-06

## 2019-11-06 ENCOUNTER — AMBULATORY - HEALTHEAST (OUTPATIENT)
Dept: INTERNAL MEDICINE | Facility: CLINIC | Age: 59
End: 2019-11-06

## 2019-11-06 ENCOUNTER — RECORDS - HEALTHEAST (OUTPATIENT)
Dept: ADMINISTRATIVE | Facility: OTHER | Age: 59
End: 2019-11-06

## 2019-11-06 ENCOUNTER — OFFICE VISIT - HEALTHEAST (OUTPATIENT)
Dept: INTERNAL MEDICINE | Facility: CLINIC | Age: 59
End: 2019-11-06

## 2019-11-06 ENCOUNTER — COMMUNICATION - HEALTHEAST (OUTPATIENT)
Dept: ANTICOAGULATION | Facility: CLINIC | Age: 59
End: 2019-11-06

## 2019-11-06 ENCOUNTER — RECORDS - HEALTHEAST (OUTPATIENT)
Dept: ANTICOAGULATION | Facility: CLINIC | Age: 59
End: 2019-11-06

## 2019-11-06 DIAGNOSIS — R35.0 URINARY FREQUENCY: ICD-10-CM

## 2019-11-06 DIAGNOSIS — F25.1 SCHIZOAFFECTIVE DISORDER, DEPRESSIVE TYPE (H): ICD-10-CM

## 2019-11-06 DIAGNOSIS — I26.99 PULMONARY EMBOLISM WITH INFARCTION (H): ICD-10-CM

## 2019-11-06 DIAGNOSIS — N18.30 STAGE 3 CHRONIC KIDNEY DISEASE (H): ICD-10-CM

## 2019-11-06 DIAGNOSIS — E03.4 HYPOTHYROIDISM DUE TO ACQUIRED ATROPHY OF THYROID: ICD-10-CM

## 2019-11-06 DIAGNOSIS — R41.0 CONFUSION: ICD-10-CM

## 2019-11-06 DIAGNOSIS — G40.909 NONINTRACTABLE EPILEPSY WITHOUT STATUS EPILEPTICUS, UNSPECIFIED EPILEPSY TYPE (H): ICD-10-CM

## 2019-11-06 LAB
ALBUMIN SERPL-MCNC: 3.6 G/DL (ref 3.5–5)
ALBUMIN UR-MCNC: NEGATIVE MG/DL
ALP SERPL-CCNC: 120 U/L (ref 45–120)
ALT SERPL W P-5'-P-CCNC: 19 U/L (ref 0–45)
ANION GAP SERPL CALCULATED.3IONS-SCNC: 11 MMOL/L (ref 5–18)
APPEARANCE UR: CLEAR
AST SERPL W P-5'-P-CCNC: 18 U/L (ref 0–40)
BACTERIA #/AREA URNS HPF: ABNORMAL HPF
BILIRUB SERPL-MCNC: 0.4 MG/DL (ref 0–1)
BILIRUB UR QL STRIP: ABNORMAL
BUN SERPL-MCNC: 9 MG/DL (ref 8–22)
CALCIUM SERPL-MCNC: 8.8 MG/DL (ref 8.5–10.5)
CARBAMAZEPINE SERPL-MCNC: 5.6 UG/ML (ref 4–12)
CHLORIDE BLD-SCNC: 106 MMOL/L (ref 98–107)
CO2 SERPL-SCNC: 23 MMOL/L (ref 22–31)
COLOR UR AUTO: YELLOW
CREAT SERPL-MCNC: 1.69 MG/DL (ref 0.6–1.1)
ERYTHROCYTE [DISTWIDTH] IN BLOOD BY AUTOMATED COUNT: 12.9 % (ref 11–14.5)
GFR SERPL CREATININE-BSD FRML MDRD: 31 ML/MIN/1.73M2
GLUCOSE BLD-MCNC: 104 MG/DL (ref 70–125)
GLUCOSE UR STRIP-MCNC: NEGATIVE MG/DL
HCT VFR BLD AUTO: 42.6 % (ref 35–47)
HGB BLD-MCNC: 14.5 G/DL (ref 12–16)
HGB UR QL STRIP: ABNORMAL
INR PPP: 3.5 (ref 0.9–1.1)
KETONES UR STRIP-MCNC: NEGATIVE MG/DL
LEUKOCYTE ESTERASE UR QL STRIP: NEGATIVE
MCH RBC QN AUTO: 29.4 PG (ref 27–34)
MCHC RBC AUTO-ENTMCNC: 34 G/DL (ref 32–36)
MCV RBC AUTO: 86 FL (ref 80–100)
MUCOUS THREADS #/AREA URNS LPF: ABNORMAL LPF
NITRATE UR QL: NEGATIVE
PH UR STRIP: 7 [PH] (ref 5–8)
PLATELET # BLD AUTO: 248 THOU/UL (ref 140–440)
PMV BLD AUTO: 8.5 FL (ref 7–10)
POTASSIUM BLD-SCNC: 4.4 MMOL/L (ref 3.5–5)
PROT SERPL-MCNC: 7.3 G/DL (ref 6–8)
RBC # BLD AUTO: 4.94 MILL/UL (ref 3.8–5.4)
RBC #/AREA URNS AUTO: ABNORMAL HPF
SODIUM SERPL-SCNC: 140 MMOL/L (ref 136–145)
SP GR UR STRIP: 1.01 (ref 1–1.03)
SQUAMOUS #/AREA URNS AUTO: ABNORMAL LPF
TSH SERPL DL<=0.005 MIU/L-ACNC: 4.71 UIU/ML (ref 0.3–5)
UROBILINOGEN UR STRIP-ACNC: ABNORMAL
WBC #/AREA URNS AUTO: ABNORMAL HPF
WBC: 5.5 THOU/UL (ref 4–11)

## 2019-11-07 ENCOUNTER — COMMUNICATION - HEALTHEAST (OUTPATIENT)
Dept: INTERNAL MEDICINE | Facility: CLINIC | Age: 59
End: 2019-11-07

## 2019-11-07 LAB — BACTERIA SPEC CULT: NO GROWTH

## 2019-11-10 LAB — TOPIRAMATE SERPL-MCNC: <1.5 UG/ML (ref 5–20)

## 2019-11-11 ENCOUNTER — COMMUNICATION - HEALTHEAST (OUTPATIENT)
Dept: INTERNAL MEDICINE | Facility: CLINIC | Age: 59
End: 2019-11-11

## 2019-11-13 ENCOUNTER — COMMUNICATION - HEALTHEAST (OUTPATIENT)
Dept: SCHEDULING | Facility: CLINIC | Age: 59
End: 2019-11-13

## 2019-11-13 ENCOUNTER — COMMUNICATION - HEALTHEAST (OUTPATIENT)
Dept: BEHAVIORAL HEALTH | Facility: CLINIC | Age: 59
End: 2019-11-13

## 2019-11-14 ENCOUNTER — COMMUNICATION - HEALTHEAST (OUTPATIENT)
Dept: ANTICOAGULATION | Facility: CLINIC | Age: 59
End: 2019-11-14

## 2019-11-14 DIAGNOSIS — I26.99 PULMONARY EMBOLISM WITH INFARCTION (H): ICD-10-CM

## 2019-11-20 ENCOUNTER — COMMUNICATION - HEALTHEAST (OUTPATIENT)
Dept: INTERNAL MEDICINE | Facility: CLINIC | Age: 59
End: 2019-11-20

## 2019-11-20 DIAGNOSIS — K59.09 OTHER CONSTIPATION: ICD-10-CM

## 2019-11-26 ENCOUNTER — COMMUNICATION - HEALTHEAST (OUTPATIENT)
Dept: BEHAVIORAL HEALTH | Facility: CLINIC | Age: 59
End: 2019-11-26

## 2019-11-26 DIAGNOSIS — F17.218 NICOTINE DEPENDENCE, CIGARETTES, WITH OTHER NICOTINE-INDUCED DISORDERS: ICD-10-CM

## 2019-11-26 DIAGNOSIS — F33.2 SEVERE EPISODE OF RECURRENT MAJOR DEPRESSIVE DISORDER, WITHOUT PSYCHOTIC FEATURES (H): ICD-10-CM

## 2019-12-04 ENCOUNTER — COMMUNICATION - HEALTHEAST (OUTPATIENT)
Dept: ANTICOAGULATION | Facility: CLINIC | Age: 59
End: 2019-12-04

## 2019-12-10 ENCOUNTER — COMMUNICATION - HEALTHEAST (OUTPATIENT)
Dept: BEHAVIORAL HEALTH | Facility: CLINIC | Age: 59
End: 2019-12-10

## 2019-12-10 ENCOUNTER — OFFICE VISIT - HEALTHEAST (OUTPATIENT)
Dept: BEHAVIORAL HEALTH | Facility: CLINIC | Age: 59
End: 2019-12-10

## 2019-12-10 ENCOUNTER — RECORDS - HEALTHEAST (OUTPATIENT)
Dept: ANTICOAGULATION | Facility: CLINIC | Age: 59
End: 2019-12-10

## 2019-12-10 DIAGNOSIS — R41.0 DELIRIUM: ICD-10-CM

## 2019-12-10 DIAGNOSIS — F32.1 MODERATE MAJOR DEPRESSION (H): ICD-10-CM

## 2019-12-10 DIAGNOSIS — R41.89 NEUROCOGNITIVE DEFICITS: ICD-10-CM

## 2019-12-10 DIAGNOSIS — R29.818 NEUROCOGNITIVE DEFICITS: ICD-10-CM

## 2019-12-11 ENCOUNTER — COMMUNICATION - HEALTHEAST (OUTPATIENT)
Dept: INTERNAL MEDICINE | Facility: CLINIC | Age: 59
End: 2019-12-11

## 2019-12-11 DIAGNOSIS — E21.0 PRIMARY HYPERPARATHYROIDISM (H): ICD-10-CM

## 2019-12-11 DIAGNOSIS — E78.2 MIXED HYPERLIPIDEMIA: ICD-10-CM

## 2019-12-13 ENCOUNTER — COMMUNICATION - HEALTHEAST (OUTPATIENT)
Dept: ANTICOAGULATION | Facility: CLINIC | Age: 59
End: 2019-12-13

## 2019-12-13 DIAGNOSIS — I26.99 PULMONARY EMBOLISM WITH INFARCTION (H): ICD-10-CM

## 2019-12-18 ENCOUNTER — COMMUNICATION - HEALTHEAST (OUTPATIENT)
Dept: ANTICOAGULATION | Facility: CLINIC | Age: 59
End: 2019-12-18

## 2019-12-18 ENCOUNTER — OFFICE VISIT - HEALTHEAST (OUTPATIENT)
Dept: INTERNAL MEDICINE | Facility: CLINIC | Age: 59
End: 2019-12-18

## 2019-12-18 DIAGNOSIS — F33.1 MAJOR DEPRESSIVE DISORDER, RECURRENT EPISODE, MODERATE (H): ICD-10-CM

## 2019-12-18 DIAGNOSIS — I10 ESSENTIAL HYPERTENSION WITH GOAL BLOOD PRESSURE LESS THAN 140/90: ICD-10-CM

## 2019-12-18 DIAGNOSIS — I26.99 PULMONARY EMBOLISM WITH INFARCTION (H): ICD-10-CM

## 2019-12-18 DIAGNOSIS — E66.01 MORBID OBESITY WITH BMI OF 45.0-49.9, ADULT (H): ICD-10-CM

## 2019-12-18 DIAGNOSIS — N18.30 STAGE 3 CHRONIC KIDNEY DISEASE (H): ICD-10-CM

## 2019-12-18 DIAGNOSIS — G40.909 NONINTRACTABLE EPILEPSY WITHOUT STATUS EPILEPTICUS, UNSPECIFIED EPILEPSY TYPE (H): ICD-10-CM

## 2019-12-18 DIAGNOSIS — F17.218 NICOTINE DEPENDENCE, CIGARETTES, WITH OTHER NICOTINE-INDUCED DISORDERS: ICD-10-CM

## 2019-12-18 DIAGNOSIS — F41.9 ANXIETY DISORDER, UNSPECIFIED TYPE: ICD-10-CM

## 2019-12-18 LAB — INR PPP: 3.8 (ref 0.9–1.1)

## 2019-12-18 ASSESSMENT — PATIENT HEALTH QUESTIONNAIRE - PHQ9: SUM OF ALL RESPONSES TO PHQ QUESTIONS 1-9: 21

## 2019-12-18 ASSESSMENT — MIFFLIN-ST. JEOR: SCORE: 2133.52

## 2019-12-20 ENCOUNTER — COMMUNICATION - HEALTHEAST (OUTPATIENT)
Dept: BEHAVIORAL HEALTH | Facility: CLINIC | Age: 59
End: 2019-12-20

## 2019-12-26 ENCOUNTER — COMMUNICATION - HEALTHEAST (OUTPATIENT)
Dept: INTERNAL MEDICINE | Facility: CLINIC | Age: 59
End: 2019-12-26

## 2019-12-26 ENCOUNTER — RECORDS - HEALTHEAST (OUTPATIENT)
Dept: ANTICOAGULATION | Facility: CLINIC | Age: 59
End: 2019-12-26

## 2019-12-26 ENCOUNTER — AMBULATORY - HEALTHEAST (OUTPATIENT)
Dept: LAB | Facility: CLINIC | Age: 59
End: 2019-12-26

## 2019-12-26 DIAGNOSIS — I26.99 PULMONARY EMBOLISM WITH INFARCTION (H): ICD-10-CM

## 2019-12-26 LAB
INR PPP: 2.2 (ref 0.9–1.1)
INR PPP: 2.2 (ref 0.9–1.1)

## 2019-12-31 ENCOUNTER — AMBULATORY - HEALTHEAST (OUTPATIENT)
Dept: BEHAVIORAL HEALTH | Facility: CLINIC | Age: 59
End: 2019-12-31

## 2020-01-02 ENCOUNTER — RECORDS - HEALTHEAST (OUTPATIENT)
Dept: ADMINISTRATIVE | Facility: OTHER | Age: 60
End: 2020-01-02

## 2020-01-07 ENCOUNTER — COMMUNICATION - HEALTHEAST (OUTPATIENT)
Dept: BEHAVIORAL HEALTH | Facility: CLINIC | Age: 60
End: 2020-01-07

## 2020-01-07 ENCOUNTER — RECORDS - HEALTHEAST (OUTPATIENT)
Dept: ADMINISTRATIVE | Facility: OTHER | Age: 60
End: 2020-01-07

## 2020-01-15 ENCOUNTER — OFFICE VISIT - HEALTHEAST (OUTPATIENT)
Dept: BEHAVIORAL HEALTH | Facility: CLINIC | Age: 60
End: 2020-01-15

## 2020-01-15 ENCOUNTER — AMBULATORY - HEALTHEAST (OUTPATIENT)
Dept: BEHAVIORAL HEALTH | Facility: CLINIC | Age: 60
End: 2020-01-15

## 2020-01-15 DIAGNOSIS — F60.3 BORDERLINE PERSONALITY DISORDER (H): ICD-10-CM

## 2020-01-15 DIAGNOSIS — F33.1 DEPRESSION, MAJOR, RECURRENT, MODERATE (H): ICD-10-CM

## 2020-01-15 DIAGNOSIS — F41.1 GENERALIZED ANXIETY DISORDER: ICD-10-CM

## 2020-01-15 DIAGNOSIS — F43.12 CHRONIC POST-TRAUMATIC STRESS DISORDER (PTSD): ICD-10-CM

## 2020-01-15 DIAGNOSIS — R41.89 COGNITIVE DEFICITS: ICD-10-CM

## 2020-01-16 ENCOUNTER — AMBULATORY - HEALTHEAST (OUTPATIENT)
Dept: LAB | Facility: CLINIC | Age: 60
End: 2020-01-16

## 2020-01-16 ENCOUNTER — COMMUNICATION - HEALTHEAST (OUTPATIENT)
Dept: ANTICOAGULATION | Facility: CLINIC | Age: 60
End: 2020-01-16

## 2020-01-16 DIAGNOSIS — I26.99 PULMONARY EMBOLISM WITH INFARCTION (H): ICD-10-CM

## 2020-01-16 LAB
INR PPP: 1.9 (ref 0.9–1.1)
INR PPP: 2.1 (ref 0.9–1.1)

## 2020-01-21 ENCOUNTER — OFFICE VISIT - HEALTHEAST (OUTPATIENT)
Dept: INTERNAL MEDICINE | Facility: CLINIC | Age: 60
End: 2020-01-21

## 2020-01-21 DIAGNOSIS — G40.909 NONINTRACTABLE EPILEPSY WITHOUT STATUS EPILEPTICUS, UNSPECIFIED EPILEPSY TYPE (H): ICD-10-CM

## 2020-01-21 DIAGNOSIS — M54.50 ACUTE BILATERAL LOW BACK PAIN WITHOUT SCIATICA: ICD-10-CM

## 2020-01-21 DIAGNOSIS — E27.8 ADRENAL MASS (H): ICD-10-CM

## 2020-01-21 DIAGNOSIS — F17.218 NICOTINE DEPENDENCE, CIGARETTES, WITH OTHER NICOTINE-INDUCED DISORDERS: ICD-10-CM

## 2020-01-21 DIAGNOSIS — G62.0 POLYNEUROPATHY DUE TO DRUG (H): ICD-10-CM

## 2020-01-21 DIAGNOSIS — C73 MALIGNANT NEOPLASM OF THYROID GLAND (H): ICD-10-CM

## 2020-01-21 DIAGNOSIS — F25.1 SCHIZOAFFECTIVE DISORDER, DEPRESSIVE TYPE (H): ICD-10-CM

## 2020-01-21 DIAGNOSIS — E66.01 MORBID OBESITY WITH BMI OF 45.0-49.9, ADULT (H): ICD-10-CM

## 2020-01-21 DIAGNOSIS — N18.30 STAGE 3 CHRONIC KIDNEY DISEASE (H): ICD-10-CM

## 2020-01-21 DIAGNOSIS — J44.9 CHRONIC OBSTRUCTIVE PULMONARY DISEASE, UNSPECIFIED COPD TYPE (H): ICD-10-CM

## 2020-01-21 DIAGNOSIS — I26.99 PULMONARY EMBOLISM WITH INFARCTION (H): ICD-10-CM

## 2020-01-21 ASSESSMENT — MIFFLIN-ST. JEOR: SCORE: 2133.52

## 2020-01-22 ENCOUNTER — COMMUNICATION - HEALTHEAST (OUTPATIENT)
Dept: ANTICOAGULATION | Facility: CLINIC | Age: 60
End: 2020-01-22

## 2020-01-28 ENCOUNTER — COMMUNICATION - HEALTHEAST (OUTPATIENT)
Dept: INTERNAL MEDICINE | Facility: CLINIC | Age: 60
End: 2020-01-28

## 2020-01-28 ENCOUNTER — OFFICE VISIT - HEALTHEAST (OUTPATIENT)
Dept: BEHAVIORAL HEALTH | Facility: CLINIC | Age: 60
End: 2020-01-28

## 2020-01-28 DIAGNOSIS — F60.3 BORDERLINE PERSONALITY DISORDER (H): ICD-10-CM

## 2020-01-28 DIAGNOSIS — K59.09 OTHER CONSTIPATION: ICD-10-CM

## 2020-01-28 DIAGNOSIS — F25.1 SCHIZOAFFECTIVE DISORDER, DEPRESSIVE TYPE (H): ICD-10-CM

## 2020-01-28 DIAGNOSIS — M54.9 BACK PAIN: ICD-10-CM

## 2020-01-28 DIAGNOSIS — R63.0 APPETITE LOSS: ICD-10-CM

## 2020-01-28 DIAGNOSIS — F32.9 MAJOR DEPRESSIVE DISORDER WITHOUT PSYCHOTIC FEATURES: ICD-10-CM

## 2020-01-28 DIAGNOSIS — F17.218 NICOTINE DEPENDENCE, CIGARETTES, WITH OTHER NICOTINE-INDUCED DISORDERS: ICD-10-CM

## 2020-01-29 ENCOUNTER — OFFICE VISIT - HEALTHEAST (OUTPATIENT)
Dept: INTERNAL MEDICINE | Facility: CLINIC | Age: 60
End: 2020-01-29

## 2020-01-29 ENCOUNTER — COMMUNICATION - HEALTHEAST (OUTPATIENT)
Dept: INTERNAL MEDICINE | Facility: CLINIC | Age: 60
End: 2020-01-29

## 2020-01-29 DIAGNOSIS — M54.9 BACK PAIN: ICD-10-CM

## 2020-01-29 DIAGNOSIS — M54.50 CHRONIC LOW BACK PAIN WITHOUT SCIATICA, UNSPECIFIED BACK PAIN LATERALITY: ICD-10-CM

## 2020-01-29 DIAGNOSIS — G89.29 CHRONIC LOW BACK PAIN WITHOUT SCIATICA, UNSPECIFIED BACK PAIN LATERALITY: ICD-10-CM

## 2020-01-30 ENCOUNTER — HOSPITAL ENCOUNTER (OUTPATIENT)
Dept: PHYSICAL MEDICINE AND REHAB | Facility: CLINIC | Age: 60
Discharge: HOME | End: 2020-01-30
Attending: INTERNAL MEDICINE

## 2020-01-30 DIAGNOSIS — M54.6 ACUTE MIDLINE THORACIC BACK PAIN: ICD-10-CM

## 2020-01-30 DIAGNOSIS — Z87.81 HISTORY OF VERTEBRAL COMPRESSION FRACTURE: ICD-10-CM

## 2020-01-30 DIAGNOSIS — R20.0 NUMBNESS AND TINGLING OF LEFT LOWER EXTREMITY: ICD-10-CM

## 2020-01-30 DIAGNOSIS — M54.50 ACUTE BILATERAL LOW BACK PAIN WITHOUT SCIATICA: ICD-10-CM

## 2020-01-30 DIAGNOSIS — R20.2 NUMBNESS AND TINGLING OF LEFT LOWER EXTREMITY: ICD-10-CM

## 2020-01-30 ASSESSMENT — MIFFLIN-ST. JEOR: SCORE: 2142.59

## 2020-02-05 ENCOUNTER — COMMUNICATION - HEALTHEAST (OUTPATIENT)
Dept: INTERNAL MEDICINE | Facility: CLINIC | Age: 60
End: 2020-02-05

## 2020-02-05 ENCOUNTER — COMMUNICATION - HEALTHEAST (OUTPATIENT)
Dept: BEHAVIORAL HEALTH | Facility: CLINIC | Age: 60
End: 2020-02-05

## 2020-02-05 DIAGNOSIS — N32.81 OAB (OVERACTIVE BLADDER): ICD-10-CM

## 2020-02-05 DIAGNOSIS — K21.9 GASTROESOPHAGEAL REFLUX DISEASE WITHOUT ESOPHAGITIS: ICD-10-CM

## 2020-02-05 DIAGNOSIS — F33.2 SEVERE EPISODE OF RECURRENT MAJOR DEPRESSIVE DISORDER, WITHOUT PSYCHOTIC FEATURES (H): ICD-10-CM

## 2020-02-05 DIAGNOSIS — F17.218 NICOTINE DEPENDENCE, CIGARETTES, WITH OTHER NICOTINE-INDUCED DISORDERS: ICD-10-CM

## 2020-02-10 ENCOUNTER — COMMUNICATION - HEALTHEAST (OUTPATIENT)
Dept: INTERNAL MEDICINE | Facility: CLINIC | Age: 60
End: 2020-02-10

## 2020-02-10 ENCOUNTER — AMBULATORY - HEALTHEAST (OUTPATIENT)
Dept: PHYSICAL MEDICINE AND REHAB | Facility: CLINIC | Age: 60
End: 2020-02-10

## 2020-02-10 ENCOUNTER — COMMUNICATION - HEALTHEAST (OUTPATIENT)
Dept: LAB | Facility: CLINIC | Age: 60
End: 2020-02-10

## 2020-02-10 ENCOUNTER — HOSPITAL ENCOUNTER (OUTPATIENT)
Dept: ULTRASOUND IMAGING | Facility: CLINIC | Age: 60
Discharge: HOME | End: 2020-02-10
Attending: INTERNAL MEDICINE

## 2020-02-10 ENCOUNTER — OFFICE VISIT - HEALTHEAST (OUTPATIENT)
Dept: INTERNAL MEDICINE | Facility: CLINIC | Age: 60
End: 2020-02-10

## 2020-02-10 ENCOUNTER — COMMUNICATION - HEALTHEAST (OUTPATIENT)
Dept: BEHAVIORAL HEALTH | Facility: CLINIC | Age: 60
End: 2020-02-10

## 2020-02-10 DIAGNOSIS — M54.50 ACUTE BILATERAL LOW BACK PAIN WITHOUT SCIATICA: ICD-10-CM

## 2020-02-10 DIAGNOSIS — R33.9 URINARY RETENTION: ICD-10-CM

## 2020-02-10 DIAGNOSIS — Z87.81 HISTORY OF VERTEBRAL COMPRESSION FRACTURE: ICD-10-CM

## 2020-02-10 DIAGNOSIS — R20.0 NUMBNESS AND TINGLING OF LEFT LOWER EXTREMITY: ICD-10-CM

## 2020-02-10 DIAGNOSIS — R20.2 NUMBNESS AND TINGLING OF LEFT LOWER EXTREMITY: ICD-10-CM

## 2020-02-10 DIAGNOSIS — M54.6 ACUTE MIDLINE THORACIC BACK PAIN: ICD-10-CM

## 2020-02-10 DIAGNOSIS — I26.99 PULMONARY EMBOLISM WITH INFARCTION (H): ICD-10-CM

## 2020-02-11 ENCOUNTER — COMMUNICATION - HEALTHEAST (OUTPATIENT)
Dept: LAB | Facility: CLINIC | Age: 60
End: 2020-02-11

## 2020-02-11 ENCOUNTER — AMBULATORY - HEALTHEAST (OUTPATIENT)
Dept: LAB | Facility: CLINIC | Age: 60
End: 2020-02-11

## 2020-02-11 DIAGNOSIS — I26.99 PULMONARY EMBOLISM WITH INFARCTION (H): ICD-10-CM

## 2020-02-11 LAB — INR PPP: 3.44 (ref 0.9–1.1)

## 2020-02-12 ENCOUNTER — COMMUNICATION - HEALTHEAST (OUTPATIENT)
Dept: INTERNAL MEDICINE | Facility: CLINIC | Age: 60
End: 2020-02-12

## 2020-02-12 ENCOUNTER — COMMUNICATION - HEALTHEAST (OUTPATIENT)
Dept: BEHAVIORAL HEALTH | Facility: CLINIC | Age: 60
End: 2020-02-12

## 2020-02-12 DIAGNOSIS — F33.2 SEVERE EPISODE OF RECURRENT MAJOR DEPRESSIVE DISORDER, WITHOUT PSYCHOTIC FEATURES (H): ICD-10-CM

## 2020-02-12 DIAGNOSIS — F17.218 NICOTINE DEPENDENCE, CIGARETTES, WITH OTHER NICOTINE-INDUCED DISORDERS: ICD-10-CM

## 2020-02-12 DIAGNOSIS — I26.99 PULMONARY EMBOLISM WITH INFARCTION (H): ICD-10-CM

## 2020-02-13 ENCOUNTER — RECORDS - HEALTHEAST (OUTPATIENT)
Dept: ADMINISTRATIVE | Facility: OTHER | Age: 60
End: 2020-02-13

## 2020-02-18 ENCOUNTER — COMMUNICATION - HEALTHEAST (OUTPATIENT)
Dept: ANTICOAGULATION | Facility: CLINIC | Age: 60
End: 2020-02-18

## 2020-03-12 ENCOUNTER — COMMUNICATION - HEALTHEAST (OUTPATIENT)
Dept: INTERNAL MEDICINE | Facility: CLINIC | Age: 60
End: 2020-03-12

## 2020-03-12 ENCOUNTER — HOSPITAL ENCOUNTER (OUTPATIENT)
Dept: RADIOLOGY | Facility: HOSPITAL | Age: 60
Discharge: HOME | End: 2020-03-12

## 2020-03-12 ENCOUNTER — HOSPITAL ENCOUNTER (OUTPATIENT)
Dept: CT IMAGING | Facility: HOSPITAL | Age: 60
Discharge: HOME | End: 2020-03-12

## 2020-03-12 DIAGNOSIS — Z87.81 HISTORY OF VERTEBRAL COMPRESSION FRACTURE: ICD-10-CM

## 2020-03-12 DIAGNOSIS — M54.6 ACUTE MIDLINE THORACIC BACK PAIN: ICD-10-CM

## 2020-03-12 DIAGNOSIS — I26.99 PULMONARY EMBOLISM WITH INFARCTION (H): ICD-10-CM

## 2020-03-12 DIAGNOSIS — Z79.01 LONG TERM (CURRENT) USE OF ANTICOAGULANTS: ICD-10-CM

## 2020-03-12 DIAGNOSIS — M54.50 ACUTE BILATERAL LOW BACK PAIN WITHOUT SCIATICA: ICD-10-CM

## 2020-03-12 DIAGNOSIS — R20.2 NUMBNESS AND TINGLING OF LEFT LOWER EXTREMITY: ICD-10-CM

## 2020-03-12 DIAGNOSIS — R20.0 NUMBNESS AND TINGLING OF LEFT LOWER EXTREMITY: ICD-10-CM

## 2020-03-13 ENCOUNTER — NURSE TRIAGE (OUTPATIENT)
Dept: NURSING | Facility: CLINIC | Age: 60
End: 2020-03-13

## 2020-03-13 ENCOUNTER — AMBULATORY - HEALTHEAST (OUTPATIENT)
Dept: PHYSICAL MEDICINE AND REHAB | Facility: CLINIC | Age: 60
End: 2020-03-13

## 2020-03-13 ENCOUNTER — COMMUNICATION - HEALTHEAST (OUTPATIENT)
Dept: ANTICOAGULATION | Facility: CLINIC | Age: 60
End: 2020-03-13

## 2020-03-13 ENCOUNTER — OFFICE VISIT - HEALTHEAST (OUTPATIENT)
Dept: INTERNAL MEDICINE | Facility: CLINIC | Age: 60
End: 2020-03-13

## 2020-03-13 ENCOUNTER — COMMUNICATION - HEALTHEAST (OUTPATIENT)
Dept: OTHER | Facility: CLINIC | Age: 60
End: 2020-03-13

## 2020-03-13 ENCOUNTER — COMMUNICATION - HEALTHEAST (OUTPATIENT)
Dept: SCHEDULING | Facility: CLINIC | Age: 60
End: 2020-03-13

## 2020-03-13 ENCOUNTER — COMMUNICATION - HEALTHEAST (OUTPATIENT)
Dept: PHYSICAL MEDICINE AND REHAB | Facility: CLINIC | Age: 60
End: 2020-03-13

## 2020-03-13 DIAGNOSIS — S32.050A CLOSED COMPRESSION FRACTURE OF FIFTH LUMBAR VERTEBRA, INITIAL ENCOUNTER: ICD-10-CM

## 2020-03-13 DIAGNOSIS — R31.0 GROSS HEMATURIA: ICD-10-CM

## 2020-03-13 DIAGNOSIS — M80.00XA AGE-RELATED OSTEOPOROSIS WITH CURRENT PATHOLOGICAL FRACTURE, INITIAL ENCOUNTER: ICD-10-CM

## 2020-03-13 DIAGNOSIS — R20.2 NUMBNESS AND TINGLING OF LEFT LOWER EXTREMITY: ICD-10-CM

## 2020-03-13 DIAGNOSIS — E55.9 VITAMIN D DEFICIENCY: ICD-10-CM

## 2020-03-13 DIAGNOSIS — Z12.31 VISIT FOR SCREENING MAMMOGRAM: ICD-10-CM

## 2020-03-13 DIAGNOSIS — S32.020A CLOSED COMPRESSION FRACTURE OF SECOND LUMBAR VERTEBRA, INITIAL ENCOUNTER: ICD-10-CM

## 2020-03-13 DIAGNOSIS — M54.6 ACUTE MIDLINE THORACIC BACK PAIN: ICD-10-CM

## 2020-03-13 DIAGNOSIS — I26.99 PULMONARY EMBOLISM WITH INFARCTION (H): ICD-10-CM

## 2020-03-13 DIAGNOSIS — R20.0 NUMBNESS AND TINGLING OF LEFT LOWER EXTREMITY: ICD-10-CM

## 2020-03-13 DIAGNOSIS — I10 ESSENTIAL HYPERTENSION WITH GOAL BLOOD PRESSURE LESS THAN 140/90: ICD-10-CM

## 2020-03-13 DIAGNOSIS — Z87.81 HISTORY OF VERTEBRAL COMPRESSION FRACTURE: ICD-10-CM

## 2020-03-13 DIAGNOSIS — M54.50 ACUTE BILATERAL LOW BACK PAIN WITHOUT SCIATICA: ICD-10-CM

## 2020-03-13 LAB — INR PPP: 5.3 (ref 0.9–1.1)

## 2020-03-13 ASSESSMENT — MIFFLIN-ST. JEOR: SCORE: 2142.59

## 2020-03-13 NOTE — TELEPHONE ENCOUNTER
PCP is with [facility].    Additional Information    Negative: SEVERE vaginal bleeding (e.g., continuous red blood from vagina, or large blood clots) and very weak (can't stand)    Negative: Passed out (i.e., fainted, collapsed and was not responding)    Negative: Difficult to awaken or acting confused (e.g., disoriented, slurred speech)    Negative: Shock suspected (e.g., cold/pale/clammy skin, too weak to stand, low BP, rapid pulse)    Negative: Sounds like a life-threatening emergency to the triager    Negative: Pregnant > 20 weeks (5 months or more)    Negative: Pregnant < 20 weeks (less than 5 months)    Negative: Postpartum < 1 month since delivery ('Did you recently give birth?')    Negative: Vaginal discharge is the main symptom and bleeding is slight    Negative: SEVERE abdominal pain (e.g., excruciating)    Negative: SEVERE dizziness (e.g., unable to stand, requires support to walk, feels like passing out now)    Negative: SEVERE vaginal bleeding (i.e., soaking 2 pads or tampons per hour and present 2 or more hours; 1 menstrual cup every 2 hours)    Negative: MODERATE vaginal bleeding (i.e., soaking pad or tampon per hour and present > 6 hours; 1 menstrual cup every 6 hours)    Negative: Pale skin (pallor) of new onset or worsening    Negative: Constant abdominal pain lasting > 2 hours    Negative: Patient sounds very sick or weak to the triager    Taking Coumadin (warfarin) or other strong blood thinner, or known bleeding disorder (e.g., thrombocytopenia)    Protocols used: VAGINAL BLEEDING - [id number]

## 2020-03-15 LAB — 25(OH)D3 SERPL-MCNC: 20.6 NG/ML (ref 30–80)

## 2020-03-16 ENCOUNTER — AMBULATORY - HEALTHEAST (OUTPATIENT)
Dept: PHYSICAL MEDICINE AND REHAB | Facility: CLINIC | Age: 60
End: 2020-03-16

## 2020-03-16 ENCOUNTER — COMMUNICATION - HEALTHEAST (OUTPATIENT)
Dept: PHYSICAL MEDICINE AND REHAB | Facility: CLINIC | Age: 60
End: 2020-03-16

## 2020-03-16 ENCOUNTER — AMBULATORY - HEALTHEAST (OUTPATIENT)
Dept: LAB | Facility: CLINIC | Age: 60
End: 2020-03-16

## 2020-03-16 ENCOUNTER — COMMUNICATION - HEALTHEAST (OUTPATIENT)
Dept: ANTICOAGULATION | Facility: CLINIC | Age: 60
End: 2020-03-16

## 2020-03-16 DIAGNOSIS — E55.9 VITAMIN D DEFICIENCY: ICD-10-CM

## 2020-03-16 DIAGNOSIS — I26.99 PULMONARY EMBOLISM WITH INFARCTION (H): ICD-10-CM

## 2020-03-16 DIAGNOSIS — M54.50 ACUTE BILATERAL LOW BACK PAIN WITHOUT SCIATICA: ICD-10-CM

## 2020-03-16 DIAGNOSIS — M80.00XA AGE-RELATED OSTEOPOROSIS WITH CURRENT PATHOLOGICAL FRACTURE, INITIAL ENCOUNTER: ICD-10-CM

## 2020-03-16 LAB — INR PPP: 1.3 (ref 0.9–1.1)

## 2020-03-20 ENCOUNTER — COMMUNICATION - HEALTHEAST (OUTPATIENT)
Dept: ANTICOAGULATION | Facility: CLINIC | Age: 60
End: 2020-03-20

## 2020-03-20 ENCOUNTER — AMBULATORY - HEALTHEAST (OUTPATIENT)
Dept: LAB | Facility: CLINIC | Age: 60
End: 2020-03-20

## 2020-03-20 DIAGNOSIS — I26.99 PULMONARY EMBOLISM WITH INFARCTION (H): ICD-10-CM

## 2020-03-20 LAB — INR PPP: 2.5 (ref 0.9–1.1)

## 2020-03-24 ENCOUNTER — COMMUNICATION - HEALTHEAST (OUTPATIENT)
Dept: PHYSICAL MEDICINE AND REHAB | Facility: CLINIC | Age: 60
End: 2020-03-24

## 2020-03-24 DIAGNOSIS — E55.9 VITAMIN D DEFICIENCY: ICD-10-CM

## 2020-03-24 DIAGNOSIS — M80.00XA AGE-RELATED OSTEOPOROSIS WITH CURRENT PATHOLOGICAL FRACTURE, INITIAL ENCOUNTER: ICD-10-CM

## 2020-03-24 DIAGNOSIS — M54.50 ACUTE BILATERAL LOW BACK PAIN WITHOUT SCIATICA: ICD-10-CM

## 2020-03-25 ENCOUNTER — AMBULATORY - HEALTHEAST (OUTPATIENT)
Dept: OTHER | Facility: CLINIC | Age: 60
End: 2020-03-25

## 2020-03-27 ENCOUNTER — AMBULATORY - HEALTHEAST (OUTPATIENT)
Dept: OTHER | Facility: CLINIC | Age: 60
End: 2020-03-27

## 2020-03-31 ENCOUNTER — RECORDS - HEALTHEAST (OUTPATIENT)
Dept: ADMINISTRATIVE | Facility: OTHER | Age: 60
End: 2020-03-31

## 2020-03-31 ENCOUNTER — OFFICE VISIT - HEALTHEAST (OUTPATIENT)
Dept: BEHAVIORAL HEALTH | Facility: CLINIC | Age: 60
End: 2020-03-31

## 2020-03-31 ENCOUNTER — COMMUNICATION - HEALTHEAST (OUTPATIENT)
Dept: INTERNAL MEDICINE | Facility: CLINIC | Age: 60
End: 2020-03-31

## 2020-03-31 DIAGNOSIS — F25.1 SCHIZOAFFECTIVE DISORDER, DEPRESSIVE TYPE (H): ICD-10-CM

## 2020-03-31 DIAGNOSIS — F60.3 BORDERLINE PERSONALITY DISORDER (H): ICD-10-CM

## 2020-03-31 DIAGNOSIS — F32.9 MAJOR DEPRESSIVE DISORDER WITHOUT PSYCHOTIC FEATURES: ICD-10-CM

## 2020-03-31 DIAGNOSIS — R63.0 APPETITE LOSS: ICD-10-CM

## 2020-03-31 DIAGNOSIS — K59.09 OTHER CONSTIPATION: ICD-10-CM

## 2020-03-31 DIAGNOSIS — F17.218 NICOTINE DEPENDENCE, CIGARETTES, WITH OTHER NICOTINE-INDUCED DISORDERS: ICD-10-CM

## 2020-04-01 ENCOUNTER — COMMUNICATION - HEALTHEAST (OUTPATIENT)
Dept: ANTICOAGULATION | Facility: CLINIC | Age: 60
End: 2020-04-01

## 2020-04-02 ENCOUNTER — COMMUNICATION - HEALTHEAST (OUTPATIENT)
Dept: INTERNAL MEDICINE | Facility: CLINIC | Age: 60
End: 2020-04-02

## 2020-04-02 DIAGNOSIS — E55.9 VITAMIN D DEFICIENCY: ICD-10-CM

## 2020-04-09 ENCOUNTER — COMMUNICATION - HEALTHEAST (OUTPATIENT)
Dept: PHYSICAL MEDICINE AND REHAB | Facility: CLINIC | Age: 60
End: 2020-04-09

## 2020-04-09 ENCOUNTER — COMMUNICATION - HEALTHEAST (OUTPATIENT)
Dept: BEHAVIORAL HEALTH | Facility: CLINIC | Age: 60
End: 2020-04-09

## 2020-04-09 DIAGNOSIS — S32.050A CLOSED COMPRESSION FRACTURE OF FIFTH LUMBAR VERTEBRA, INITIAL ENCOUNTER: ICD-10-CM

## 2020-04-09 DIAGNOSIS — M54.50 ACUTE BILATERAL LOW BACK PAIN WITHOUT SCIATICA: ICD-10-CM

## 2020-04-09 DIAGNOSIS — S32.020A CLOSED COMPRESSION FRACTURE OF SECOND LUMBAR VERTEBRA, INITIAL ENCOUNTER: ICD-10-CM

## 2020-04-15 ENCOUNTER — OFFICE VISIT - HEALTHEAST (OUTPATIENT)
Dept: INTERNAL MEDICINE | Facility: CLINIC | Age: 60
End: 2020-04-15

## 2020-04-15 ENCOUNTER — COMMUNICATION - HEALTHEAST (OUTPATIENT)
Dept: SCHEDULING | Facility: CLINIC | Age: 60
End: 2020-04-15

## 2020-04-15 ENCOUNTER — COMMUNICATION - HEALTHEAST (OUTPATIENT)
Dept: INTERNAL MEDICINE | Facility: CLINIC | Age: 60
End: 2020-04-15

## 2020-04-15 DIAGNOSIS — H10.33 ACUTE CONJUNCTIVITIS OF BOTH EYES, UNSPECIFIED ACUTE CONJUNCTIVITIS TYPE: ICD-10-CM

## 2020-04-16 ENCOUNTER — COMMUNICATION - HEALTHEAST (OUTPATIENT)
Dept: PHYSICAL MEDICINE AND REHAB | Facility: CLINIC | Age: 60
End: 2020-04-16

## 2020-04-21 ENCOUNTER — COMMUNICATION - HEALTHEAST (OUTPATIENT)
Dept: INTERNAL MEDICINE | Facility: CLINIC | Age: 60
End: 2020-04-21

## 2020-04-21 DIAGNOSIS — I26.99 PULMONARY EMBOLISM WITH INFARCTION (H): ICD-10-CM

## 2020-04-22 ENCOUNTER — RECORDS - HEALTHEAST (OUTPATIENT)
Dept: ANTICOAGULATION | Facility: CLINIC | Age: 60
End: 2020-04-22

## 2020-04-22 ENCOUNTER — AMBULATORY - HEALTHEAST (OUTPATIENT)
Dept: LAB | Facility: CLINIC | Age: 60
End: 2020-04-22

## 2020-04-22 DIAGNOSIS — I26.99 PULMONARY EMBOLISM WITH INFARCTION (H): ICD-10-CM

## 2020-04-22 LAB — INR PPP: 1 (ref 0.9–1.1)

## 2020-04-29 ENCOUNTER — AMBULATORY - HEALTHEAST (OUTPATIENT)
Dept: LAB | Facility: CLINIC | Age: 60
End: 2020-04-29

## 2020-04-29 ENCOUNTER — COMMUNICATION - HEALTHEAST (OUTPATIENT)
Dept: ANTICOAGULATION | Facility: CLINIC | Age: 60
End: 2020-04-29

## 2020-04-29 DIAGNOSIS — I26.99 PULMONARY EMBOLISM WITH INFARCTION (H): ICD-10-CM

## 2020-04-29 LAB — INR PPP: 1.7 (ref 0.9–1.1)

## 2020-05-06 ENCOUNTER — AMBULATORY - HEALTHEAST (OUTPATIENT)
Dept: LAB | Facility: CLINIC | Age: 60
End: 2020-05-06

## 2020-05-06 ENCOUNTER — COMMUNICATION - HEALTHEAST (OUTPATIENT)
Dept: ANTICOAGULATION | Facility: CLINIC | Age: 60
End: 2020-05-06

## 2020-05-06 DIAGNOSIS — I26.99 PULMONARY EMBOLISM WITH INFARCTION (H): ICD-10-CM

## 2020-05-06 LAB — INR PPP: 1.5 (ref 0.9–1.1)

## 2020-05-18 ENCOUNTER — COMMUNICATION - HEALTHEAST (OUTPATIENT)
Dept: SCHEDULING | Facility: CLINIC | Age: 60
End: 2020-05-18

## 2020-05-18 ENCOUNTER — OFFICE VISIT - HEALTHEAST (OUTPATIENT)
Dept: INTERNAL MEDICINE | Facility: CLINIC | Age: 60
End: 2020-05-18

## 2020-05-18 ENCOUNTER — COMMUNICATION - HEALTHEAST (OUTPATIENT)
Dept: ANTICOAGULATION | Facility: CLINIC | Age: 60
End: 2020-05-18

## 2020-05-18 ENCOUNTER — AMBULATORY - HEALTHEAST (OUTPATIENT)
Dept: LAB | Facility: CLINIC | Age: 60
End: 2020-05-18

## 2020-05-18 DIAGNOSIS — I26.99 PULMONARY EMBOLISM WITH INFARCTION (H): ICD-10-CM

## 2020-05-18 DIAGNOSIS — H10.13 ALLERGIC CONJUNCTIVITIS, BILATERAL: ICD-10-CM

## 2020-05-18 LAB — INR PPP: 1.4 (ref 0.9–1.1)

## 2020-05-20 ENCOUNTER — COMMUNICATION - HEALTHEAST (OUTPATIENT)
Dept: BEHAVIORAL HEALTH | Facility: CLINIC | Age: 60
End: 2020-05-20

## 2020-05-20 ENCOUNTER — COMMUNICATION - HEALTHEAST (OUTPATIENT)
Dept: INTERNAL MEDICINE | Facility: CLINIC | Age: 60
End: 2020-05-20

## 2020-05-20 DIAGNOSIS — I26.99 PULMONARY EMBOLISM WITH INFARCTION (H): ICD-10-CM

## 2020-05-21 ENCOUNTER — RECORDS - HEALTHEAST (OUTPATIENT)
Dept: ADMINISTRATIVE | Facility: OTHER | Age: 60
End: 2020-05-21

## 2020-05-22 ENCOUNTER — AMBULATORY - HEALTHEAST (OUTPATIENT)
Dept: LAB | Facility: CLINIC | Age: 60
End: 2020-05-22

## 2020-05-22 DIAGNOSIS — G40.209 LOCALIZATION-RELATED PARTIAL EPILEPSY WITH COMPLEX PARTIAL SEIZURES (H): ICD-10-CM

## 2020-06-03 ENCOUNTER — COMMUNICATION - HEALTHEAST (OUTPATIENT)
Dept: INTERNAL MEDICINE | Facility: CLINIC | Age: 60
End: 2020-06-03

## 2020-06-05 ENCOUNTER — COMMUNICATION - HEALTHEAST (OUTPATIENT)
Dept: BEHAVIORAL HEALTH | Facility: CLINIC | Age: 60
End: 2020-06-05

## 2020-06-11 ENCOUNTER — AMBULATORY - HEALTHEAST (OUTPATIENT)
Dept: LAB | Facility: CLINIC | Age: 60
End: 2020-06-11

## 2020-06-11 ENCOUNTER — COMMUNICATION - HEALTHEAST (OUTPATIENT)
Dept: ANTICOAGULATION | Facility: CLINIC | Age: 60
End: 2020-06-11

## 2020-06-11 DIAGNOSIS — G40.209 LOCALIZATION-RELATED PARTIAL EPILEPSY WITH COMPLEX PARTIAL SEIZURES (H): ICD-10-CM

## 2020-06-11 DIAGNOSIS — I26.99 PULMONARY EMBOLISM WITH INFARCTION (H): ICD-10-CM

## 2020-06-11 LAB
CARBAMAZEPINE SERPL-MCNC: 8.4 UG/ML (ref 4–12)
INR PPP: 1.8 (ref 0.9–1.1)

## 2020-06-15 ENCOUNTER — AMBULATORY - HEALTHEAST (OUTPATIENT)
Dept: PHYSICAL MEDICINE AND REHAB | Facility: CLINIC | Age: 60
End: 2020-06-15

## 2020-06-15 ENCOUNTER — COMMUNICATION - HEALTHEAST (OUTPATIENT)
Dept: PHYSICAL MEDICINE AND REHAB | Facility: CLINIC | Age: 60
End: 2020-06-15

## 2020-06-15 DIAGNOSIS — M80.00XG AGE-RELATED OSTEOPOROSIS WITH CURRENT PATHOLOGICAL FRACTURE WITH DELAYED HEALING, SUBSEQUENT ENCOUNTER: ICD-10-CM

## 2020-06-15 DIAGNOSIS — M54.50 ACUTE BILATERAL LOW BACK PAIN WITHOUT SCIATICA: ICD-10-CM

## 2020-06-17 ENCOUNTER — HOSPITAL ENCOUNTER (OUTPATIENT)
Dept: RADIOLOGY | Facility: HOSPITAL | Age: 60
Discharge: HOME | End: 2020-06-17

## 2020-06-17 ENCOUNTER — COMMUNICATION - HEALTHEAST (OUTPATIENT)
Dept: INTERNAL MEDICINE | Facility: CLINIC | Age: 60
End: 2020-06-17

## 2020-06-17 DIAGNOSIS — M80.00XG AGE-RELATED OSTEOPOROSIS WITH CURRENT PATHOLOGICAL FRACTURE WITH DELAYED HEALING, SUBSEQUENT ENCOUNTER: ICD-10-CM

## 2020-06-17 DIAGNOSIS — M54.50 ACUTE BILATERAL LOW BACK PAIN WITHOUT SCIATICA: ICD-10-CM

## 2020-06-19 LAB
MISCELLANEOUS TEST DEPT. - HE HISTORICAL: NORMAL
PERFORMING LAB: NORMAL
SPECIMEN STATUS: NORMAL
TEST NAME: NORMAL

## 2020-06-29 ENCOUNTER — COMMUNICATION - HEALTHEAST (OUTPATIENT)
Dept: ANTICOAGULATION | Facility: CLINIC | Age: 60
End: 2020-06-29

## 2020-06-29 ENCOUNTER — RECORDS - HEALTHEAST (OUTPATIENT)
Dept: ANTICOAGULATION | Facility: CLINIC | Age: 60
End: 2020-06-29

## 2020-06-29 ENCOUNTER — AMBULATORY - HEALTHEAST (OUTPATIENT)
Dept: LAB | Facility: CLINIC | Age: 60
End: 2020-06-29

## 2020-06-29 DIAGNOSIS — I26.99 PULMONARY EMBOLISM WITH INFARCTION (H): ICD-10-CM

## 2020-06-29 LAB
INR PPP: 1.6 (ref 0.9–1.1)
INR PPP: 1.6 (ref 0.9–1.1)

## 2020-06-30 ENCOUNTER — OFFICE VISIT - HEALTHEAST (OUTPATIENT)
Dept: BEHAVIORAL HEALTH | Facility: CLINIC | Age: 60
End: 2020-06-30

## 2020-06-30 ENCOUNTER — COMMUNICATION - HEALTHEAST (OUTPATIENT)
Dept: INTERNAL MEDICINE | Facility: CLINIC | Age: 60
End: 2020-06-30

## 2020-06-30 DIAGNOSIS — I26.99 PULMONARY EMBOLISM WITH INFARCTION (H): ICD-10-CM

## 2020-06-30 DIAGNOSIS — F25.1 SCHIZOAFFECTIVE DISORDER, DEPRESSIVE TYPE (H): ICD-10-CM

## 2020-06-30 DIAGNOSIS — G47.10 SLEEPING EXCESSIVE: ICD-10-CM

## 2020-07-01 ENCOUNTER — COMMUNICATION - HEALTHEAST (OUTPATIENT)
Dept: INTERNAL MEDICINE | Facility: CLINIC | Age: 60
End: 2020-07-01

## 2020-07-01 ENCOUNTER — HOSPITAL ENCOUNTER (OUTPATIENT)
Dept: PHYSICAL MEDICINE AND REHAB | Facility: CLINIC | Age: 60
Discharge: HOME | End: 2020-07-01
Attending: NURSE PRACTITIONER

## 2020-07-01 ENCOUNTER — COMMUNICATION - HEALTHEAST (OUTPATIENT)
Dept: OTHER | Facility: CLINIC | Age: 60
End: 2020-07-01

## 2020-07-01 ENCOUNTER — COMMUNICATION - HEALTHEAST (OUTPATIENT)
Dept: BEHAVIORAL HEALTH | Facility: CLINIC | Age: 60
End: 2020-07-01

## 2020-07-01 ENCOUNTER — RECORDS - HEALTHEAST (OUTPATIENT)
Dept: ADMINISTRATIVE | Facility: OTHER | Age: 60
End: 2020-07-01

## 2020-07-01 DIAGNOSIS — M54.41 CHRONIC BILATERAL LOW BACK PAIN WITH RIGHT-SIDED SCIATICA: ICD-10-CM

## 2020-07-01 DIAGNOSIS — Z87.81 HISTORY OF VERTEBRAL COMPRESSION FRACTURE: ICD-10-CM

## 2020-07-01 DIAGNOSIS — M80.00XA AGE-RELATED OSTEOPOROSIS WITH CURRENT PATHOLOGICAL FRACTURE, INITIAL ENCOUNTER: ICD-10-CM

## 2020-07-01 DIAGNOSIS — E55.9 VITAMIN D DEFICIENCY: ICD-10-CM

## 2020-07-01 DIAGNOSIS — M80.88XD OSTEOPOROTIC COMPRESSION FRACTURE OF SPINE, WITH ROUTINE HEALING, SUBSEQUENT ENCOUNTER: ICD-10-CM

## 2020-07-01 DIAGNOSIS — I26.99 PULMONARY EMBOLISM WITH INFARCTION (H): ICD-10-CM

## 2020-07-01 DIAGNOSIS — M80.08XA AGE-RELATED OSTEOPOROSIS WITH CURRENT PATHOLOGICAL FRACTURE, VERTEBRA(E), INITIAL ENCOUNTER FOR FRACTURE (H): ICD-10-CM

## 2020-07-01 DIAGNOSIS — M80.00XG AGE-RELATED OSTEOPOROSIS WITH CURRENT PATHOLOGICAL FRACTURE WITH DELAYED HEALING, SUBSEQUENT ENCOUNTER: ICD-10-CM

## 2020-07-01 DIAGNOSIS — G89.29 CHRONIC BILATERAL LOW BACK PAIN WITH RIGHT-SIDED SCIATICA: ICD-10-CM

## 2020-07-14 ENCOUNTER — COMMUNICATION - HEALTHEAST (OUTPATIENT)
Dept: ANTICOAGULATION | Facility: CLINIC | Age: 60
End: 2020-07-14

## 2020-07-14 ENCOUNTER — RECORDS - HEALTHEAST (OUTPATIENT)
Dept: BONE DENSITY | Facility: CLINIC | Age: 60
End: 2020-07-14

## 2020-07-14 ENCOUNTER — RECORDS - HEALTHEAST (OUTPATIENT)
Dept: ADMINISTRATIVE | Facility: OTHER | Age: 60
End: 2020-07-14

## 2020-07-14 ENCOUNTER — AMBULATORY - HEALTHEAST (OUTPATIENT)
Dept: LAB | Facility: CLINIC | Age: 60
End: 2020-07-14

## 2020-07-14 DIAGNOSIS — I26.99 PULMONARY EMBOLISM WITH INFARCTION (H): ICD-10-CM

## 2020-07-14 DIAGNOSIS — Z87.81 PERSONAL HISTORY OF (HEALED) TRAUMATIC FRACTURE: ICD-10-CM

## 2020-07-14 DIAGNOSIS — M80.08XA AGE-RELATED OSTEOPOROSIS WITH CURRENT PATHOLOGICAL FRACTURE, VERTEBRA(E), INITIAL ENCOUNTER FOR FRACTURE (H): ICD-10-CM

## 2020-07-14 DIAGNOSIS — M80.00XG AGE-RELATED OSTEOPOROSIS WITH CURRENT PATHOLOGICAL FRACTURE, UNSPECIFIED SITE, SUBSEQUENT ENCOUNTER FOR FRACTURE WITH DELAYED HEALING: ICD-10-CM

## 2020-07-14 DIAGNOSIS — M54.41 LUMBAGO WITH SCIATICA, RIGHT SIDE: ICD-10-CM

## 2020-07-14 DIAGNOSIS — G89.29 OTHER CHRONIC PAIN: ICD-10-CM

## 2020-07-14 LAB — INR PPP: 1.7 (ref 0.9–1.1)

## 2020-07-15 ENCOUNTER — COMMUNICATION - HEALTHEAST (OUTPATIENT)
Dept: INTERNAL MEDICINE | Facility: CLINIC | Age: 60
End: 2020-07-15

## 2020-07-15 DIAGNOSIS — I26.99 PULMONARY EMBOLISM WITH INFARCTION (H): ICD-10-CM

## 2020-07-21 ENCOUNTER — COMMUNICATION - HEALTHEAST (OUTPATIENT)
Dept: PHYSICAL MEDICINE AND REHAB | Facility: CLINIC | Age: 60
End: 2020-07-21

## 2020-07-22 ENCOUNTER — RECORDS - HEALTHEAST (OUTPATIENT)
Dept: ADMINISTRATIVE | Facility: OTHER | Age: 60
End: 2020-07-22

## 2020-07-22 ENCOUNTER — COMMUNICATION - HEALTHEAST (OUTPATIENT)
Dept: INTERNAL MEDICINE | Facility: CLINIC | Age: 60
End: 2020-07-22

## 2020-07-22 DIAGNOSIS — I26.99 PULMONARY EMBOLISM WITH INFARCTION (H): ICD-10-CM

## 2020-07-22 DIAGNOSIS — Z79.01 LONG TERM (CURRENT) USE OF ANTICOAGULANTS: ICD-10-CM

## 2020-07-23 ENCOUNTER — HOSPITAL ENCOUNTER (OUTPATIENT)
Dept: PHYSICAL MEDICINE AND REHAB | Facility: CLINIC | Age: 60
Discharge: HOME | End: 2020-07-23
Attending: NURSE PRACTITIONER

## 2020-07-23 DIAGNOSIS — M54.41 CHRONIC BILATERAL LOW BACK PAIN WITH RIGHT-SIDED SCIATICA: ICD-10-CM

## 2020-07-23 DIAGNOSIS — Z87.81 HISTORY OF VERTEBRAL COMPRESSION FRACTURE: ICD-10-CM

## 2020-07-23 DIAGNOSIS — G89.29 CHRONIC BILATERAL LOW BACK PAIN WITH RIGHT-SIDED SCIATICA: ICD-10-CM

## 2020-07-23 DIAGNOSIS — M80.00XG AGE-RELATED OSTEOPOROSIS WITH CURRENT PATHOLOGICAL FRACTURE WITH DELAYED HEALING, SUBSEQUENT ENCOUNTER: ICD-10-CM

## 2020-07-27 ENCOUNTER — AMBULATORY - HEALTHEAST (OUTPATIENT)
Dept: LAB | Facility: CLINIC | Age: 60
End: 2020-07-27

## 2020-07-27 ENCOUNTER — COMMUNICATION - HEALTHEAST (OUTPATIENT)
Dept: ANTICOAGULATION | Facility: CLINIC | Age: 60
End: 2020-07-27

## 2020-07-27 DIAGNOSIS — I26.99 PULMONARY EMBOLISM WITH INFARCTION (H): ICD-10-CM

## 2020-07-27 LAB — INR PPP: 2.2 (ref 0.9–1.1)

## 2020-07-28 ENCOUNTER — COMMUNICATION - HEALTHEAST (OUTPATIENT)
Dept: BEHAVIORAL HEALTH | Facility: CLINIC | Age: 60
End: 2020-07-28

## 2020-08-04 ENCOUNTER — RECORDS - HEALTHEAST (OUTPATIENT)
Dept: ADMINISTRATIVE | Facility: OTHER | Age: 60
End: 2020-08-04

## 2020-08-05 ENCOUNTER — COMMUNICATION - HEALTHEAST (OUTPATIENT)
Dept: PHYSICAL MEDICINE AND REHAB | Facility: CLINIC | Age: 60
End: 2020-08-05

## 2020-08-10 ENCOUNTER — COMMUNICATION - HEALTHEAST (OUTPATIENT)
Dept: ANTICOAGULATION | Facility: CLINIC | Age: 60
End: 2020-08-10

## 2020-08-10 ENCOUNTER — AMBULATORY - HEALTHEAST (OUTPATIENT)
Dept: LAB | Facility: CLINIC | Age: 60
End: 2020-08-10

## 2020-08-10 DIAGNOSIS — I26.99 PULMONARY EMBOLISM WITH INFARCTION (H): ICD-10-CM

## 2020-08-10 LAB — INR PPP: 2.3 (ref 0.9–1.1)

## 2020-08-13 ENCOUNTER — COMMUNICATION - HEALTHEAST (OUTPATIENT)
Dept: BEHAVIORAL HEALTH | Facility: CLINIC | Age: 60
End: 2020-08-13

## 2020-08-17 ENCOUNTER — COMMUNICATION - HEALTHEAST (OUTPATIENT)
Dept: PHYSICAL MEDICINE AND REHAB | Facility: CLINIC | Age: 60
End: 2020-08-17

## 2020-08-18 ENCOUNTER — COMMUNICATION - HEALTHEAST (OUTPATIENT)
Dept: INTERNAL MEDICINE | Facility: CLINIC | Age: 60
End: 2020-08-18

## 2020-08-18 DIAGNOSIS — I26.99 PULMONARY EMBOLISM WITH INFARCTION (H): ICD-10-CM

## 2020-08-18 DIAGNOSIS — Z79.01 LONG TERM (CURRENT) USE OF ANTICOAGULANTS: ICD-10-CM

## 2020-08-26 ENCOUNTER — COMMUNICATION - HEALTHEAST (OUTPATIENT)
Dept: PHYSICAL MEDICINE AND REHAB | Facility: CLINIC | Age: 60
End: 2020-08-26

## 2020-08-26 ENCOUNTER — AMBULATORY - HEALTHEAST (OUTPATIENT)
Dept: PHYSICAL MEDICINE AND REHAB | Facility: CLINIC | Age: 60
End: 2020-08-26

## 2020-08-26 DIAGNOSIS — M54.41 CHRONIC BILATERAL LOW BACK PAIN WITH RIGHT-SIDED SCIATICA: ICD-10-CM

## 2020-08-26 DIAGNOSIS — G89.29 CHRONIC BILATERAL LOW BACK PAIN WITH RIGHT-SIDED SCIATICA: ICD-10-CM

## 2020-08-26 DIAGNOSIS — E55.9 VITAMIN D DEFICIENCY: ICD-10-CM

## 2020-08-26 DIAGNOSIS — R20.0 NUMBNESS AND TINGLING OF LEFT LOWER EXTREMITY: ICD-10-CM

## 2020-08-26 DIAGNOSIS — S32.020G CLOSED COMPRESSION FRACTURE OF L2 LUMBAR VERTEBRA WITH DELAYED HEALING, SUBSEQUENT ENCOUNTER: ICD-10-CM

## 2020-08-26 DIAGNOSIS — M80.00XG AGE-RELATED OSTEOPOROSIS WITH CURRENT PATHOLOGICAL FRACTURE WITH DELAYED HEALING, SUBSEQUENT ENCOUNTER: ICD-10-CM

## 2020-08-26 DIAGNOSIS — S32.050G CLOSED COMPRESSION FRACTURE OF L5 LUMBAR VERTEBRA WITH DELAYED HEALING, SUBSEQUENT ENCOUNTER: ICD-10-CM

## 2020-08-26 DIAGNOSIS — R20.2 NUMBNESS AND TINGLING OF LEFT LOWER EXTREMITY: ICD-10-CM

## 2020-08-26 DIAGNOSIS — Z87.81 HISTORY OF VERTEBRAL COMPRESSION FRACTURE: ICD-10-CM

## 2020-08-27 ENCOUNTER — AMBULATORY - HEALTHEAST (OUTPATIENT)
Dept: INTERNAL MEDICINE | Facility: CLINIC | Age: 60
End: 2020-08-27

## 2020-08-27 ENCOUNTER — COMMUNICATION - HEALTHEAST (OUTPATIENT)
Dept: INTERNAL MEDICINE | Facility: CLINIC | Age: 60
End: 2020-08-27

## 2020-09-10 ENCOUNTER — COMMUNICATION - HEALTHEAST (OUTPATIENT)
Dept: ANTICOAGULATION | Facility: CLINIC | Age: 60
End: 2020-09-10

## 2020-09-10 ENCOUNTER — AMBULATORY - HEALTHEAST (OUTPATIENT)
Dept: LAB | Facility: CLINIC | Age: 60
End: 2020-09-10

## 2020-09-10 DIAGNOSIS — I26.99 PULMONARY EMBOLISM WITH INFARCTION (H): ICD-10-CM

## 2020-09-10 LAB — INR PPP: 3.9 (ref 0.9–1.1)

## 2020-09-14 ENCOUNTER — COMMUNICATION - HEALTHEAST (OUTPATIENT)
Dept: INTERNAL MEDICINE | Facility: CLINIC | Age: 60
End: 2020-09-14

## 2020-09-14 DIAGNOSIS — I26.99 PULMONARY EMBOLISM WITH INFARCTION (H): ICD-10-CM

## 2020-09-16 ENCOUNTER — COMMUNICATION - HEALTHEAST (OUTPATIENT)
Dept: SCHEDULING | Facility: CLINIC | Age: 60
End: 2020-09-16

## 2020-09-24 ENCOUNTER — OFFICE VISIT - HEALTHEAST (OUTPATIENT)
Dept: INTERNAL MEDICINE | Facility: CLINIC | Age: 60
End: 2020-09-24

## 2020-09-24 ENCOUNTER — COMMUNICATION - HEALTHEAST (OUTPATIENT)
Dept: ANTICOAGULATION | Facility: CLINIC | Age: 60
End: 2020-09-24

## 2020-09-24 DIAGNOSIS — H92.21 OTORRHAGIA OF RIGHT EAR: ICD-10-CM

## 2020-09-24 DIAGNOSIS — I10 ESSENTIAL HYPERTENSION WITH GOAL BLOOD PRESSURE LESS THAN 140/90: ICD-10-CM

## 2020-09-24 DIAGNOSIS — E66.01 MORBID OBESITY WITH BMI OF 45.0-49.9, ADULT (H): ICD-10-CM

## 2020-09-24 DIAGNOSIS — F41.9 ANXIETY DISORDER, UNSPECIFIED TYPE: ICD-10-CM

## 2020-09-24 DIAGNOSIS — I26.99 PULMONARY EMBOLISM WITH INFARCTION (H): ICD-10-CM

## 2020-09-24 DIAGNOSIS — F25.1 SCHIZOAFFECTIVE DISORDER, DEPRESSIVE TYPE (H): ICD-10-CM

## 2020-09-24 DIAGNOSIS — G40.909 NONINTRACTABLE EPILEPSY WITHOUT STATUS EPILEPTICUS, UNSPECIFIED EPILEPSY TYPE (H): ICD-10-CM

## 2020-09-24 LAB
ALBUMIN SERPL-MCNC: 3.4 G/DL (ref 3.5–5)
ALP SERPL-CCNC: 106 U/L (ref 45–120)
ALT SERPL W P-5'-P-CCNC: 28 U/L (ref 0–45)
ANION GAP SERPL CALCULATED.3IONS-SCNC: 9 MMOL/L (ref 5–18)
AST SERPL W P-5'-P-CCNC: 23 U/L (ref 0–40)
BILIRUB SERPL-MCNC: 0.2 MG/DL (ref 0–1)
BUN SERPL-MCNC: 6 MG/DL (ref 8–22)
CALCIUM SERPL-MCNC: 8.7 MG/DL (ref 8.5–10.5)
CHLORIDE BLD-SCNC: 110 MMOL/L (ref 98–107)
CO2 SERPL-SCNC: 23 MMOL/L (ref 22–31)
CREAT SERPL-MCNC: 1.21 MG/DL (ref 0.6–1.1)
ERYTHROCYTE [DISTWIDTH] IN BLOOD BY AUTOMATED COUNT: 11.7 % (ref 11–14.5)
GFR SERPL CREATININE-BSD FRML MDRD: 46 ML/MIN/1.73M2
GLUCOSE BLD-MCNC: 108 MG/DL (ref 70–125)
HCT VFR BLD AUTO: 41.5 % (ref 35–47)
HGB BLD-MCNC: 14 G/DL (ref 12–16)
INR PPP: 2.4 (ref 0.9–1.1)
MCH RBC QN AUTO: 30 PG (ref 27–34)
MCHC RBC AUTO-ENTMCNC: 33.7 G/DL (ref 32–36)
MCV RBC AUTO: 89 FL (ref 80–100)
PLATELET # BLD AUTO: 225 THOU/UL (ref 140–440)
PMV BLD AUTO: 8.1 FL (ref 7–10)
POTASSIUM BLD-SCNC: 3.9 MMOL/L (ref 3.5–5)
PROT SERPL-MCNC: 6.8 G/DL (ref 6–8)
RBC # BLD AUTO: 4.65 MILL/UL (ref 3.8–5.4)
SODIUM SERPL-SCNC: 142 MMOL/L (ref 136–145)
TSH SERPL DL<=0.005 MIU/L-ACNC: 4.79 UIU/ML (ref 0.3–5)
WBC: 3.2 THOU/UL (ref 4–11)

## 2020-09-24 ASSESSMENT — MIFFLIN-ST. JEOR: SCORE: 2115.37

## 2020-09-24 ASSESSMENT — PATIENT HEALTH QUESTIONNAIRE - PHQ9: SUM OF ALL RESPONSES TO PHQ QUESTIONS 1-9: 0

## 2020-09-25 ENCOUNTER — COMMUNICATION - HEALTHEAST (OUTPATIENT)
Dept: INTERNAL MEDICINE | Facility: CLINIC | Age: 60
End: 2020-09-25

## 2020-09-28 ENCOUNTER — COMMUNICATION - HEALTHEAST (OUTPATIENT)
Dept: ANTICOAGULATION | Facility: CLINIC | Age: 60
End: 2020-09-28

## 2020-09-28 ENCOUNTER — AMBULATORY - HEALTHEAST (OUTPATIENT)
Dept: LAB | Facility: CLINIC | Age: 60
End: 2020-09-28

## 2020-09-28 ENCOUNTER — OFFICE VISIT - HEALTHEAST (OUTPATIENT)
Dept: OTOLARYNGOLOGY | Facility: CLINIC | Age: 60
End: 2020-09-28

## 2020-09-28 DIAGNOSIS — I26.99 PULMONARY EMBOLISM WITH INFARCTION (H): ICD-10-CM

## 2020-09-28 DIAGNOSIS — H60.391 INFECTIVE OTITIS EXTERNA, RIGHT: ICD-10-CM

## 2020-09-28 DIAGNOSIS — H61.23 BILATERAL IMPACTED CERUMEN: ICD-10-CM

## 2020-09-28 LAB — INR PPP: 2.6 (ref 0.9–1.1)

## 2020-09-30 ENCOUNTER — OFFICE VISIT - HEALTHEAST (OUTPATIENT)
Dept: INTERNAL MEDICINE | Facility: CLINIC | Age: 60
End: 2020-09-30

## 2020-09-30 DIAGNOSIS — M80.08XD FRACTURE OF VERTEBRA DUE TO OSTEOPOROSIS WITH ROUTINE HEALING, SUBSEQUENT ENCOUNTER: ICD-10-CM

## 2020-09-30 DIAGNOSIS — M80.00XD AGE-RELATED OSTEOPOROSIS WITH CURRENT PATHOLOGICAL FRACTURE WITH ROUTINE HEALING, SUBSEQUENT ENCOUNTER: ICD-10-CM

## 2020-09-30 DIAGNOSIS — N18.30 STAGE 3 CHRONIC KIDNEY DISEASE (H): ICD-10-CM

## 2020-10-01 ENCOUNTER — COMMUNICATION - HEALTHEAST (OUTPATIENT)
Dept: INTERNAL MEDICINE | Facility: CLINIC | Age: 60
End: 2020-10-01

## 2020-10-02 ENCOUNTER — AMBULATORY - HEALTHEAST (OUTPATIENT)
Dept: LAB | Facility: CLINIC | Age: 60
End: 2020-10-02

## 2020-10-02 ENCOUNTER — COMMUNICATION - HEALTHEAST (OUTPATIENT)
Dept: ANTICOAGULATION | Facility: CLINIC | Age: 60
End: 2020-10-02

## 2020-10-02 DIAGNOSIS — I26.99 PULMONARY EMBOLISM WITH INFARCTION (H): ICD-10-CM

## 2020-10-02 LAB — INR PPP: 2.3 (ref 0.9–1.1)

## 2020-10-05 ENCOUNTER — COMMUNICATION - HEALTHEAST (OUTPATIENT)
Dept: BEHAVIORAL HEALTH | Facility: CLINIC | Age: 60
End: 2020-10-05

## 2020-10-05 ENCOUNTER — AMBULATORY - HEALTHEAST (OUTPATIENT)
Dept: NURSING | Facility: CLINIC | Age: 60
End: 2020-10-05

## 2020-10-07 ENCOUNTER — COMMUNICATION - HEALTHEAST (OUTPATIENT)
Dept: BEHAVIORAL HEALTH | Facility: CLINIC | Age: 60
End: 2020-10-07

## 2020-10-07 DIAGNOSIS — F32.9 MAJOR DEPRESSIVE DISORDER WITHOUT PSYCHOTIC FEATURES: ICD-10-CM

## 2020-10-07 DIAGNOSIS — F17.218 NICOTINE DEPENDENCE, CIGARETTES, WITH OTHER NICOTINE-INDUCED DISORDERS: ICD-10-CM

## 2020-10-07 DIAGNOSIS — F60.3 BORDERLINE PERSONALITY DISORDER (H): ICD-10-CM

## 2020-10-09 ENCOUNTER — COMMUNICATION - HEALTHEAST (OUTPATIENT)
Dept: BEHAVIORAL HEALTH | Facility: CLINIC | Age: 60
End: 2020-10-09

## 2020-10-09 DIAGNOSIS — F32.9 MAJOR DEPRESSIVE DISORDER WITHOUT PSYCHOTIC FEATURES: ICD-10-CM

## 2020-10-09 DIAGNOSIS — F60.3 BORDERLINE PERSONALITY DISORDER (H): ICD-10-CM

## 2020-10-09 DIAGNOSIS — F17.218 NICOTINE DEPENDENCE, CIGARETTES, WITH OTHER NICOTINE-INDUCED DISORDERS: ICD-10-CM

## 2020-10-13 ENCOUNTER — COMMUNICATION - HEALTHEAST (OUTPATIENT)
Dept: ANTICOAGULATION | Facility: CLINIC | Age: 60
End: 2020-10-13

## 2020-10-13 DIAGNOSIS — I82.409 DVT (DEEP VENOUS THROMBOSIS) (H): ICD-10-CM

## 2020-10-13 DIAGNOSIS — I26.99 PULMONARY EMBOLISM WITH INFARCTION (H): ICD-10-CM

## 2020-10-19 ENCOUNTER — COMMUNICATION - HEALTHEAST (OUTPATIENT)
Dept: INTERNAL MEDICINE | Facility: CLINIC | Age: 60
End: 2020-10-19

## 2020-10-19 DIAGNOSIS — I26.99 PULMONARY EMBOLISM WITH INFARCTION (H): ICD-10-CM

## 2020-10-21 ENCOUNTER — COMMUNICATION - HEALTHEAST (OUTPATIENT)
Dept: INTERNAL MEDICINE | Facility: CLINIC | Age: 60
End: 2020-10-21

## 2020-10-21 DIAGNOSIS — K59.09 OTHER CONSTIPATION: ICD-10-CM

## 2020-10-22 ENCOUNTER — OFFICE VISIT - HEALTHEAST (OUTPATIENT)
Dept: PHYSICAL THERAPY | Facility: REHABILITATION | Age: 60
End: 2020-10-22

## 2020-10-22 DIAGNOSIS — R53.81 PHYSICAL DECONDITIONING: ICD-10-CM

## 2020-10-22 DIAGNOSIS — M54.41 CHRONIC BILATERAL LOW BACK PAIN WITH RIGHT-SIDED SCIATICA: ICD-10-CM

## 2020-10-22 DIAGNOSIS — Z87.81 HISTORY OF VERTEBRAL COMPRESSION FRACTURE: ICD-10-CM

## 2020-10-22 DIAGNOSIS — R26.9 ABNORMALITY OF GAIT: ICD-10-CM

## 2020-10-22 DIAGNOSIS — M62.81 GENERALIZED MUSCLE WEAKNESS: ICD-10-CM

## 2020-10-22 DIAGNOSIS — G89.29 CHRONIC BILATERAL LOW BACK PAIN WITH RIGHT-SIDED SCIATICA: ICD-10-CM

## 2020-10-22 DIAGNOSIS — M80.00XG AGE-RELATED OSTEOPOROSIS WITH CURRENT PATHOLOGICAL FRACTURE WITH DELAYED HEALING, SUBSEQUENT ENCOUNTER: ICD-10-CM

## 2020-10-22 DIAGNOSIS — R29.818 DIFFICULTY BALANCING: ICD-10-CM

## 2020-10-26 ENCOUNTER — COMMUNICATION - HEALTHEAST (OUTPATIENT)
Dept: BEHAVIORAL HEALTH | Facility: CLINIC | Age: 60
End: 2020-10-26

## 2020-10-26 DIAGNOSIS — F25.1 SCHIZOAFFECTIVE DISORDER, DEPRESSIVE TYPE (H): ICD-10-CM

## 2020-10-30 ENCOUNTER — COMMUNICATION - HEALTHEAST (OUTPATIENT)
Dept: SCHEDULING | Facility: CLINIC | Age: 60
End: 2020-10-30

## 2020-10-30 ENCOUNTER — AMBULATORY - HEALTHEAST (OUTPATIENT)
Dept: LAB | Facility: CLINIC | Age: 60
End: 2020-10-30

## 2020-10-30 ENCOUNTER — COMMUNICATION - HEALTHEAST (OUTPATIENT)
Dept: INTERNAL MEDICINE | Facility: CLINIC | Age: 60
End: 2020-10-30

## 2020-10-30 ENCOUNTER — COMMUNICATION - HEALTHEAST (OUTPATIENT)
Dept: ANTICOAGULATION | Facility: CLINIC | Age: 60
End: 2020-10-30

## 2020-10-30 DIAGNOSIS — I26.99 PULMONARY EMBOLISM WITH INFARCTION (H): ICD-10-CM

## 2020-10-30 DIAGNOSIS — N32.81 OAB (OVERACTIVE BLADDER): ICD-10-CM

## 2020-10-30 DIAGNOSIS — E78.2 MIXED HYPERLIPIDEMIA: ICD-10-CM

## 2020-10-30 DIAGNOSIS — K21.9 GASTROESOPHAGEAL REFLUX DISEASE WITHOUT ESOPHAGITIS: ICD-10-CM

## 2020-10-30 DIAGNOSIS — E21.0 PRIMARY HYPERPARATHYROIDISM (H): ICD-10-CM

## 2020-10-30 DIAGNOSIS — I82.409 DVT (DEEP VENOUS THROMBOSIS) (H): ICD-10-CM

## 2020-10-30 LAB — INR PPP: 1.7 (ref 0.9–1.1)

## 2020-11-03 ENCOUNTER — OFFICE VISIT - HEALTHEAST (OUTPATIENT)
Dept: PHYSICAL THERAPY | Facility: REHABILITATION | Age: 60
End: 2020-11-03

## 2020-11-03 DIAGNOSIS — R29.818 DIFFICULTY BALANCING: ICD-10-CM

## 2020-11-03 DIAGNOSIS — G89.29 CHRONIC BILATERAL LOW BACK PAIN WITH RIGHT-SIDED SCIATICA: ICD-10-CM

## 2020-11-03 DIAGNOSIS — M54.41 CHRONIC BILATERAL LOW BACK PAIN WITH RIGHT-SIDED SCIATICA: ICD-10-CM

## 2020-11-03 DIAGNOSIS — M62.81 GENERALIZED MUSCLE WEAKNESS: ICD-10-CM

## 2020-11-03 DIAGNOSIS — R26.9 ABNORMALITY OF GAIT: ICD-10-CM

## 2020-11-03 DIAGNOSIS — Z87.81 HISTORY OF VERTEBRAL COMPRESSION FRACTURE: ICD-10-CM

## 2020-11-03 DIAGNOSIS — M80.00XG AGE-RELATED OSTEOPOROSIS WITH CURRENT PATHOLOGICAL FRACTURE WITH DELAYED HEALING, SUBSEQUENT ENCOUNTER: ICD-10-CM

## 2020-11-03 DIAGNOSIS — R53.81 PHYSICAL DECONDITIONING: ICD-10-CM

## 2020-11-17 ENCOUNTER — OFFICE VISIT - HEALTHEAST (OUTPATIENT)
Dept: PHYSICAL THERAPY | Facility: REHABILITATION | Age: 60
End: 2020-11-17

## 2020-11-17 ENCOUNTER — AMBULATORY - HEALTHEAST (OUTPATIENT)
Dept: LAB | Facility: CLINIC | Age: 60
End: 2020-11-17

## 2020-11-17 ENCOUNTER — COMMUNICATION - HEALTHEAST (OUTPATIENT)
Dept: ANTICOAGULATION | Facility: CLINIC | Age: 60
End: 2020-11-17

## 2020-11-17 DIAGNOSIS — M54.41 CHRONIC BILATERAL LOW BACK PAIN WITH RIGHT-SIDED SCIATICA: ICD-10-CM

## 2020-11-17 DIAGNOSIS — G89.29 CHRONIC BILATERAL LOW BACK PAIN WITH RIGHT-SIDED SCIATICA: ICD-10-CM

## 2020-11-17 DIAGNOSIS — R29.818 DIFFICULTY BALANCING: ICD-10-CM

## 2020-11-17 DIAGNOSIS — Z87.81 HISTORY OF VERTEBRAL COMPRESSION FRACTURE: ICD-10-CM

## 2020-11-17 DIAGNOSIS — I26.99 PULMONARY EMBOLISM WITH INFARCTION (H): ICD-10-CM

## 2020-11-17 DIAGNOSIS — R26.9 ABNORMALITY OF GAIT: ICD-10-CM

## 2020-11-17 DIAGNOSIS — M80.00XG AGE-RELATED OSTEOPOROSIS WITH CURRENT PATHOLOGICAL FRACTURE WITH DELAYED HEALING, SUBSEQUENT ENCOUNTER: ICD-10-CM

## 2020-11-17 DIAGNOSIS — R53.81 PHYSICAL DECONDITIONING: ICD-10-CM

## 2020-11-17 DIAGNOSIS — M62.81 GENERALIZED MUSCLE WEAKNESS: ICD-10-CM

## 2020-11-17 DIAGNOSIS — I82.409 DVT (DEEP VENOUS THROMBOSIS) (H): ICD-10-CM

## 2020-11-17 LAB — INR PPP: 4.7 (ref 0.9–1.1)

## 2020-11-24 ENCOUNTER — COMMUNICATION - HEALTHEAST (OUTPATIENT)
Dept: BEHAVIORAL HEALTH | Facility: CLINIC | Age: 60
End: 2020-11-24

## 2020-11-30 ENCOUNTER — AMBULATORY - HEALTHEAST (OUTPATIENT)
Dept: LAB | Facility: CLINIC | Age: 60
End: 2020-11-30

## 2020-11-30 ENCOUNTER — COMMUNICATION - HEALTHEAST (OUTPATIENT)
Dept: ANTICOAGULATION | Facility: CLINIC | Age: 60
End: 2020-11-30

## 2020-11-30 DIAGNOSIS — I26.99 PULMONARY EMBOLISM WITH INFARCTION (H): ICD-10-CM

## 2020-11-30 DIAGNOSIS — I82.409 DVT (DEEP VENOUS THROMBOSIS) (H): ICD-10-CM

## 2020-11-30 LAB — INR PPP: 1.8 (ref 0.9–1.1)

## 2020-12-01 ENCOUNTER — OFFICE VISIT - HEALTHEAST (OUTPATIENT)
Dept: BEHAVIORAL HEALTH | Facility: CLINIC | Age: 60
End: 2020-12-01

## 2020-12-01 ENCOUNTER — COMMUNICATION - HEALTHEAST (OUTPATIENT)
Dept: BEHAVIORAL HEALTH | Facility: CLINIC | Age: 60
End: 2020-12-01

## 2020-12-01 ENCOUNTER — RECORDS - HEALTHEAST (OUTPATIENT)
Dept: ADMINISTRATIVE | Facility: OTHER | Age: 60
End: 2020-12-01

## 2020-12-01 DIAGNOSIS — G47.00 INSOMNIA, UNSPECIFIED TYPE: ICD-10-CM

## 2020-12-01 DIAGNOSIS — K59.09 OTHER CONSTIPATION: ICD-10-CM

## 2020-12-01 DIAGNOSIS — F25.1 SCHIZOAFFECTIVE DISORDER, DEPRESSIVE TYPE (H): ICD-10-CM

## 2020-12-01 DIAGNOSIS — E21.0 PRIMARY HYPERPARATHYROIDISM (H): ICD-10-CM

## 2020-12-01 DIAGNOSIS — G47.30 SLEEP APNEA, UNSPECIFIED TYPE: ICD-10-CM

## 2020-12-04 ENCOUNTER — COMMUNICATION - HEALTHEAST (OUTPATIENT)
Dept: BEHAVIORAL HEALTH | Facility: CLINIC | Age: 60
End: 2020-12-04

## 2020-12-08 ENCOUNTER — AMBULATORY - HEALTHEAST (OUTPATIENT)
Dept: LAB | Facility: CLINIC | Age: 60
End: 2020-12-08

## 2020-12-08 ENCOUNTER — COMMUNICATION - HEALTHEAST (OUTPATIENT)
Dept: BEHAVIORAL HEALTH | Facility: CLINIC | Age: 60
End: 2020-12-08

## 2020-12-08 ENCOUNTER — COMMUNICATION - HEALTHEAST (OUTPATIENT)
Dept: ANTICOAGULATION | Facility: CLINIC | Age: 60
End: 2020-12-08

## 2020-12-08 ENCOUNTER — RECORDS - HEALTHEAST (OUTPATIENT)
Dept: ADMINISTRATIVE | Facility: OTHER | Age: 60
End: 2020-12-08

## 2020-12-08 ENCOUNTER — OFFICE VISIT - HEALTHEAST (OUTPATIENT)
Dept: PHYSICAL THERAPY | Facility: REHABILITATION | Age: 60
End: 2020-12-08

## 2020-12-08 DIAGNOSIS — I26.99 PULMONARY EMBOLISM WITH INFARCTION (H): ICD-10-CM

## 2020-12-08 DIAGNOSIS — M80.00XG AGE-RELATED OSTEOPOROSIS WITH CURRENT PATHOLOGICAL FRACTURE WITH DELAYED HEALING, SUBSEQUENT ENCOUNTER: ICD-10-CM

## 2020-12-08 DIAGNOSIS — R29.818 DIFFICULTY BALANCING: ICD-10-CM

## 2020-12-08 DIAGNOSIS — I82.409 DVT (DEEP VENOUS THROMBOSIS) (H): ICD-10-CM

## 2020-12-08 DIAGNOSIS — M54.41 CHRONIC BILATERAL LOW BACK PAIN WITH RIGHT-SIDED SCIATICA: ICD-10-CM

## 2020-12-08 DIAGNOSIS — G89.29 CHRONIC BILATERAL LOW BACK PAIN WITH RIGHT-SIDED SCIATICA: ICD-10-CM

## 2020-12-08 DIAGNOSIS — R53.81 PHYSICAL DECONDITIONING: ICD-10-CM

## 2020-12-08 DIAGNOSIS — R26.9 ABNORMALITY OF GAIT: ICD-10-CM

## 2020-12-08 DIAGNOSIS — Z87.81 HISTORY OF VERTEBRAL COMPRESSION FRACTURE: ICD-10-CM

## 2020-12-08 DIAGNOSIS — M62.81 GENERALIZED MUSCLE WEAKNESS: ICD-10-CM

## 2020-12-08 LAB — INR PPP: 1.8 (ref 0.9–1.1)

## 2020-12-11 ENCOUNTER — COMMUNICATION - HEALTHEAST (OUTPATIENT)
Dept: BEHAVIORAL HEALTH | Facility: CLINIC | Age: 60
End: 2020-12-11

## 2020-12-14 ENCOUNTER — COMMUNICATION - HEALTHEAST (OUTPATIENT)
Dept: INTERNAL MEDICINE | Facility: CLINIC | Age: 60
End: 2020-12-14

## 2020-12-15 ENCOUNTER — COMMUNICATION - HEALTHEAST (OUTPATIENT)
Dept: BEHAVIORAL HEALTH | Facility: CLINIC | Age: 60
End: 2020-12-15

## 2020-12-15 ENCOUNTER — AMBULATORY - HEALTHEAST (OUTPATIENT)
Dept: BEHAVIORAL HEALTH | Facility: CLINIC | Age: 60
End: 2020-12-15

## 2020-12-15 DIAGNOSIS — F41.9 ANXIETY DISORDER, UNSPECIFIED TYPE: ICD-10-CM

## 2020-12-16 ENCOUNTER — COMMUNICATION - HEALTHEAST (OUTPATIENT)
Dept: BEHAVIORAL HEALTH | Facility: CLINIC | Age: 60
End: 2020-12-16

## 2020-12-16 DIAGNOSIS — F60.3 BORDERLINE PERSONALITY DISORDER (H): ICD-10-CM

## 2020-12-16 DIAGNOSIS — F17.218 NICOTINE DEPENDENCE, CIGARETTES, WITH OTHER NICOTINE-INDUCED DISORDERS: ICD-10-CM

## 2020-12-16 DIAGNOSIS — F32.9 MAJOR DEPRESSIVE DISORDER WITHOUT PSYCHOTIC FEATURES: ICD-10-CM

## 2020-12-22 ENCOUNTER — VIRTUAL VISIT (OUTPATIENT)
Dept: SLEEP MEDICINE | Facility: CLINIC | Age: 60
End: 2020-12-22
Payer: MEDICARE

## 2020-12-22 DIAGNOSIS — E66.01 MORBID OBESITY (H): ICD-10-CM

## 2020-12-22 DIAGNOSIS — F41.9 ANXIETY: ICD-10-CM

## 2020-12-22 DIAGNOSIS — R06.83 SNORING: Primary | ICD-10-CM

## 2020-12-22 DIAGNOSIS — I10 ESSENTIAL HYPERTENSION: ICD-10-CM

## 2020-12-22 PROCEDURE — 99443 PR PHYSICIAN TELEPHONE EVALUATION 21-30 MIN: CPT | Performed by: PEDIATRICS

## 2020-12-29 ENCOUNTER — AMBULATORY - HEALTHEAST (OUTPATIENT)
Dept: LAB | Facility: CLINIC | Age: 60
End: 2020-12-29

## 2020-12-29 ENCOUNTER — COMMUNICATION - HEALTHEAST (OUTPATIENT)
Dept: FAMILY MEDICINE | Facility: CLINIC | Age: 60
End: 2020-12-29

## 2020-12-29 ENCOUNTER — COMMUNICATION - HEALTHEAST (OUTPATIENT)
Dept: ANTICOAGULATION | Facility: CLINIC | Age: 60
End: 2020-12-29

## 2020-12-29 ENCOUNTER — OFFICE VISIT - HEALTHEAST (OUTPATIENT)
Dept: PHYSICAL THERAPY | Facility: REHABILITATION | Age: 60
End: 2020-12-29

## 2020-12-29 DIAGNOSIS — R26.9 ABNORMALITY OF GAIT: ICD-10-CM

## 2020-12-29 DIAGNOSIS — M62.81 GENERALIZED MUSCLE WEAKNESS: ICD-10-CM

## 2020-12-29 DIAGNOSIS — M80.00XG AGE-RELATED OSTEOPOROSIS WITH CURRENT PATHOLOGICAL FRACTURE WITH DELAYED HEALING, SUBSEQUENT ENCOUNTER: ICD-10-CM

## 2020-12-29 DIAGNOSIS — Z87.81 HISTORY OF VERTEBRAL COMPRESSION FRACTURE: ICD-10-CM

## 2020-12-29 DIAGNOSIS — R29.818 DIFFICULTY BALANCING: ICD-10-CM

## 2020-12-29 DIAGNOSIS — I82.409 DVT (DEEP VENOUS THROMBOSIS) (H): ICD-10-CM

## 2020-12-29 DIAGNOSIS — M54.41 CHRONIC BILATERAL LOW BACK PAIN WITH RIGHT-SIDED SCIATICA: ICD-10-CM

## 2020-12-29 DIAGNOSIS — G89.29 CHRONIC BILATERAL LOW BACK PAIN WITH RIGHT-SIDED SCIATICA: ICD-10-CM

## 2020-12-29 DIAGNOSIS — I26.99 PULMONARY EMBOLISM WITH INFARCTION (H): ICD-10-CM

## 2020-12-29 DIAGNOSIS — R53.81 PHYSICAL DECONDITIONING: ICD-10-CM

## 2020-12-29 LAB — INR PPP: 1.9 (ref 0.9–1.1)

## 2020-12-30 ENCOUNTER — COMMUNICATION - HEALTHEAST (OUTPATIENT)
Dept: BEHAVIORAL HEALTH | Facility: CLINIC | Age: 60
End: 2020-12-30

## 2021-01-05 ENCOUNTER — RECORDS - HEALTHEAST (OUTPATIENT)
Dept: ADMINISTRATIVE | Facility: OTHER | Age: 61
End: 2021-01-05

## 2021-01-05 ENCOUNTER — COMMUNICATION - HEALTHEAST (OUTPATIENT)
Dept: BEHAVIORAL HEALTH | Facility: CLINIC | Age: 61
End: 2021-01-05

## 2021-01-05 ENCOUNTER — AMBULATORY - HEALTHEAST (OUTPATIENT)
Dept: BEHAVIORAL HEALTH | Facility: CLINIC | Age: 61
End: 2021-01-05

## 2021-01-07 ENCOUNTER — OFFICE VISIT - HEALTHEAST (OUTPATIENT)
Dept: BEHAVIORAL HEALTH | Facility: CLINIC | Age: 61
End: 2021-01-07

## 2021-01-07 DIAGNOSIS — F60.3 BORDERLINE PERSONALITY DISORDER (H): ICD-10-CM

## 2021-01-07 DIAGNOSIS — F43.12 POST-TRAUMATIC STRESS DISORDER, CHRONIC: ICD-10-CM

## 2021-01-07 DIAGNOSIS — F25.1 SCHIZOAFFECTIVE DISORDER, DEPRESSIVE TYPE (H): ICD-10-CM

## 2021-01-07 DIAGNOSIS — F41.9 ANXIETY DISORDER, UNSPECIFIED TYPE: ICD-10-CM

## 2021-01-12 ENCOUNTER — COMMUNICATION - HEALTHEAST (OUTPATIENT)
Dept: ANTICOAGULATION | Facility: CLINIC | Age: 61
End: 2021-01-12

## 2021-01-12 ENCOUNTER — COMMUNICATION - HEALTHEAST (OUTPATIENT)
Dept: FAMILY MEDICINE | Facility: CLINIC | Age: 61
End: 2021-01-12

## 2021-01-12 ENCOUNTER — OFFICE VISIT - HEALTHEAST (OUTPATIENT)
Dept: FAMILY MEDICINE | Facility: CLINIC | Age: 61
End: 2021-01-12

## 2021-01-12 DIAGNOSIS — K21.9 GASTROESOPHAGEAL REFLUX DISEASE WITHOUT ESOPHAGITIS: ICD-10-CM

## 2021-01-12 DIAGNOSIS — Z86.711 HISTORY OF PULMONARY EMBOLISM: ICD-10-CM

## 2021-01-12 DIAGNOSIS — Z12.31 SCREENING MAMMOGRAM, ENCOUNTER FOR: ICD-10-CM

## 2021-01-12 DIAGNOSIS — Z91.030 BEE STING ALLERGY: ICD-10-CM

## 2021-01-12 DIAGNOSIS — G47.33 OSA (OBSTRUCTIVE SLEEP APNEA): ICD-10-CM

## 2021-01-12 DIAGNOSIS — N18.31 STAGE 3A CHRONIC KIDNEY DISEASE (H): ICD-10-CM

## 2021-01-12 DIAGNOSIS — G40.909 NONINTRACTABLE EPILEPSY WITHOUT STATUS EPILEPTICUS, UNSPECIFIED EPILEPSY TYPE (H): ICD-10-CM

## 2021-01-12 DIAGNOSIS — F33.1 MAJOR DEPRESSIVE DISORDER, RECURRENT EPISODE, MODERATE (H): ICD-10-CM

## 2021-01-12 DIAGNOSIS — F25.1 SCHIZOAFFECTIVE DISORDER, DEPRESSIVE TYPE (H): ICD-10-CM

## 2021-01-12 DIAGNOSIS — J44.9 CHRONIC OBSTRUCTIVE PULMONARY DISEASE, UNSPECIFIED COPD TYPE (H): ICD-10-CM

## 2021-01-12 DIAGNOSIS — E78.2 MIXED HYPERLIPIDEMIA: ICD-10-CM

## 2021-01-12 DIAGNOSIS — F17.218 NICOTINE DEPENDENCE, CIGARETTES, WITH OTHER NICOTINE-INDUCED DISORDERS: ICD-10-CM

## 2021-01-12 DIAGNOSIS — E03.9 ACQUIRED HYPOTHYROIDISM: ICD-10-CM

## 2021-01-12 LAB — INR PPP: 2.5 (ref 0.9–1.1)

## 2021-01-12 ASSESSMENT — MIFFLIN-ST. JEOR: SCORE: 2134.08

## 2021-01-13 ENCOUNTER — AMBULATORY - HEALTHEAST (OUTPATIENT)
Dept: ANTICOAGULATION | Facility: CLINIC | Age: 61
End: 2021-01-13

## 2021-01-13 ENCOUNTER — COMMUNICATION - HEALTHEAST (OUTPATIENT)
Dept: FAMILY MEDICINE | Facility: CLINIC | Age: 61
End: 2021-01-13

## 2021-01-13 DIAGNOSIS — I26.99 PULMONARY EMBOLISM WITH INFARCTION (H): ICD-10-CM

## 2021-01-13 DIAGNOSIS — Z79.01 LONG TERM (CURRENT) USE OF ANTICOAGULANTS: ICD-10-CM

## 2021-01-18 ENCOUNTER — AMBULATORY - HEALTHEAST (OUTPATIENT)
Dept: LAB | Facility: CLINIC | Age: 61
End: 2021-01-18

## 2021-01-18 ENCOUNTER — COMMUNICATION - HEALTHEAST (OUTPATIENT)
Dept: ANTICOAGULATION | Facility: CLINIC | Age: 61
End: 2021-01-18

## 2021-01-18 DIAGNOSIS — Z86.711 HISTORY OF PULMONARY EMBOLISM: ICD-10-CM

## 2021-01-18 DIAGNOSIS — Z79.899 ENCOUNTER FOR LONG-TERM (CURRENT) USE OF MEDICATIONS: ICD-10-CM

## 2021-01-18 LAB — INR PPP: 1.5 (ref 0.9–1.1)

## 2021-01-19 ENCOUNTER — OFFICE VISIT - HEALTHEAST (OUTPATIENT)
Dept: PHYSICAL THERAPY | Facility: REHABILITATION | Age: 61
End: 2021-01-19

## 2021-01-19 DIAGNOSIS — M54.41 CHRONIC BILATERAL LOW BACK PAIN WITH RIGHT-SIDED SCIATICA: ICD-10-CM

## 2021-01-19 DIAGNOSIS — R26.9 ABNORMALITY OF GAIT: ICD-10-CM

## 2021-01-19 DIAGNOSIS — Z87.81 HISTORY OF VERTEBRAL COMPRESSION FRACTURE: ICD-10-CM

## 2021-01-19 DIAGNOSIS — G89.29 CHRONIC BILATERAL LOW BACK PAIN WITH RIGHT-SIDED SCIATICA: ICD-10-CM

## 2021-01-19 DIAGNOSIS — R53.81 PHYSICAL DECONDITIONING: ICD-10-CM

## 2021-01-19 DIAGNOSIS — M62.81 GENERALIZED MUSCLE WEAKNESS: ICD-10-CM

## 2021-01-19 DIAGNOSIS — M80.00XG AGE-RELATED OSTEOPOROSIS WITH CURRENT PATHOLOGICAL FRACTURE WITH DELAYED HEALING, SUBSEQUENT ENCOUNTER: ICD-10-CM

## 2021-01-19 DIAGNOSIS — R29.818 DIFFICULTY BALANCING: ICD-10-CM

## 2021-01-20 LAB — LAMOTRIGINE SERPL-MCNC: 3.5 UG/ML (ref 2.5–15)

## 2021-01-25 ENCOUNTER — COMMUNICATION - HEALTHEAST (OUTPATIENT)
Dept: FAMILY MEDICINE | Facility: CLINIC | Age: 61
End: 2021-01-25

## 2021-01-25 DIAGNOSIS — Z86.711 HISTORY OF PULMONARY EMBOLISM: ICD-10-CM

## 2021-01-26 ENCOUNTER — AMBULATORY - HEALTHEAST (OUTPATIENT)
Dept: LAB | Facility: CLINIC | Age: 61
End: 2021-01-26

## 2021-01-26 ENCOUNTER — COMMUNICATION - HEALTHEAST (OUTPATIENT)
Dept: ANTICOAGULATION | Facility: CLINIC | Age: 61
End: 2021-01-26

## 2021-01-26 DIAGNOSIS — Z86.711 HISTORY OF PULMONARY EMBOLISM: ICD-10-CM

## 2021-01-26 LAB — INR PPP: 1.4 (ref 0.9–1.1)

## 2021-01-27 ENCOUNTER — COMMUNICATION - HEALTHEAST (OUTPATIENT)
Dept: FAMILY MEDICINE | Facility: CLINIC | Age: 61
End: 2021-01-27

## 2021-01-27 ENCOUNTER — OFFICE VISIT (OUTPATIENT)
Dept: SLEEP MEDICINE | Facility: CLINIC | Age: 61
End: 2021-01-27
Attending: PEDIATRICS
Payer: MEDICARE

## 2021-01-27 DIAGNOSIS — Z86.711 HISTORY OF PULMONARY EMBOLISM: ICD-10-CM

## 2021-01-27 DIAGNOSIS — G47.33 OSA (OBSTRUCTIVE SLEEP APNEA): Primary | ICD-10-CM

## 2021-01-27 NOTE — PROGRESS NOTES
After spending almost 45 minutes with pt, she opted not to take home the device. Pt feels that she will fail it and would like to do a PSG instead.

## 2021-01-28 DIAGNOSIS — R06.83 SNORING: Primary | ICD-10-CM

## 2021-01-28 DIAGNOSIS — E66.01 MORBID OBESITY (H): ICD-10-CM

## 2021-01-28 DIAGNOSIS — I10 ESSENTIAL HYPERTENSION: ICD-10-CM

## 2021-01-29 ENCOUNTER — OFFICE VISIT - HEALTHEAST (OUTPATIENT)
Dept: FAMILY MEDICINE | Facility: CLINIC | Age: 61
End: 2021-01-29

## 2021-01-29 ENCOUNTER — COMMUNICATION - HEALTHEAST (OUTPATIENT)
Dept: FAMILY MEDICINE | Facility: CLINIC | Age: 61
End: 2021-01-29

## 2021-01-29 DIAGNOSIS — M80.08XD FRACTURE OF VERTEBRA DUE TO OSTEOPOROSIS WITH ROUTINE HEALING, SUBSEQUENT ENCOUNTER: ICD-10-CM

## 2021-01-29 DIAGNOSIS — S46.911A STRAIN OF RIGHT SHOULDER, INITIAL ENCOUNTER: ICD-10-CM
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
## 2021-01-29 DIAGNOSIS — S39.012A STRAIN OF LUMBAR REGION, INITIAL ENCOUNTER: ICD-10-CM

## 2021-01-29 ASSESSMENT — ANXIETY QUESTIONNAIRES
5. BEING SO RESTLESS THAT IT IS HARD TO SIT STILL: SEVERAL DAYS
7. FEELING AFRAID AS IF SOMETHING AWFUL MIGHT HAPPEN: MORE THAN HALF THE DAYS
GAD7 TOTAL SCORE: 13
3. WORRYING TOO MUCH ABOUT DIFFERENT THINGS: MORE THAN HALF THE DAYS
6. BECOMING EASILY ANNOYED OR IRRITABLE: MORE THAN HALF THE DAYS
4. TROUBLE RELAXING: MORE THAN HALF THE DAYS
1. FEELING NERVOUS, ANXIOUS, OR ON EDGE: MORE THAN HALF THE DAYS
2. NOT BEING ABLE TO STOP OR CONTROL WORRYING: MORE THAN HALF THE DAYS

## 2021-01-29 ASSESSMENT — PATIENT HEALTH QUESTIONNAIRE - PHQ9: SUM OF ALL RESPONSES TO PHQ QUESTIONS 1-9: 8

## 2021-02-01 ENCOUNTER — COMMUNICATION - HEALTHEAST (OUTPATIENT)
Dept: FAMILY MEDICINE | Facility: CLINIC | Age: 61
End: 2021-02-01

## 2021-02-01 DIAGNOSIS — R53.81 PHYSICAL DECONDITIONING: ICD-10-CM

## 2021-02-01 DIAGNOSIS — R42 DIZZINESS: ICD-10-CM

## 2021-02-01 DIAGNOSIS — R29.6 RECURRENT FALLS: ICD-10-CM

## 2021-02-02 ENCOUNTER — COMMUNICATION - HEALTHEAST (OUTPATIENT)
Dept: FAMILY MEDICINE | Facility: CLINIC | Age: 61
End: 2021-02-02

## 2021-02-02 ENCOUNTER — AMBULATORY - HEALTHEAST (OUTPATIENT)
Dept: LAB | Facility: CLINIC | Age: 61
End: 2021-02-02

## 2021-02-02 ENCOUNTER — OFFICE VISIT - HEALTHEAST (OUTPATIENT)
Dept: BEHAVIORAL HEALTH | Facility: CLINIC | Age: 61
End: 2021-02-02

## 2021-02-02 ENCOUNTER — COMMUNICATION - HEALTHEAST (OUTPATIENT)
Dept: ANTICOAGULATION | Facility: CLINIC | Age: 61
End: 2021-02-02

## 2021-02-02 DIAGNOSIS — Z86.711 HISTORY OF PULMONARY EMBOLISM: ICD-10-CM

## 2021-02-02 DIAGNOSIS — F17.218 NICOTINE DEPENDENCE, CIGARETTES, WITH OTHER NICOTINE-INDUCED DISORDERS: ICD-10-CM

## 2021-02-02 DIAGNOSIS — G47.33 OSA (OBSTRUCTIVE SLEEP APNEA): ICD-10-CM

## 2021-02-02 DIAGNOSIS — F33.1 MAJOR DEPRESSIVE DISORDER, RECURRENT EPISODE, MODERATE (H): ICD-10-CM

## 2021-02-02 DIAGNOSIS — Z79.899 HIGH RISK MEDICATION USE: ICD-10-CM

## 2021-02-02 DIAGNOSIS — T88.7XXA MEDICATION SIDE EFFECTS: ICD-10-CM

## 2021-02-02 DIAGNOSIS — F25.1 SCHIZOAFFECTIVE DISORDER, DEPRESSIVE TYPE (H): ICD-10-CM

## 2021-02-02 LAB — INR PPP: 2.3 (ref 0.9–1.1)

## 2021-02-03 ENCOUNTER — COMMUNICATION - HEALTHEAST (OUTPATIENT)
Dept: BEHAVIORAL HEALTH | Facility: CLINIC | Age: 61
End: 2021-02-03

## 2021-02-03 ENCOUNTER — DOCUMENTATION ONLY (OUTPATIENT)
Dept: SLEEP MEDICINE | Facility: CLINIC | Age: 61
End: 2021-02-03

## 2021-02-03 ENCOUNTER — THERAPY VISIT (OUTPATIENT)
Dept: SLEEP MEDICINE | Facility: CLINIC | Age: 61
End: 2021-02-03
Payer: MEDICARE

## 2021-02-03 DIAGNOSIS — I10 ESSENTIAL HYPERTENSION: ICD-10-CM

## 2021-02-03 DIAGNOSIS — R06.83 SNORING: ICD-10-CM

## 2021-02-03 DIAGNOSIS — E66.01 MORBID OBESITY (H): ICD-10-CM

## 2021-02-03 PROCEDURE — 95810 POLYSOM 6/> YRS 4/> PARAM: CPT | Performed by: PEDIATRICS

## 2021-02-05 ENCOUNTER — AMBULATORY - HEALTHEAST (OUTPATIENT)
Dept: LAB | Facility: CLINIC | Age: 61
End: 2021-02-05

## 2021-02-05 DIAGNOSIS — G40.209 LOCALIZATION-RELATED SYMPTOMATIC EPILEPSY AND EPILEPTIC SYNDROMES WITH COMPLEX PARTIAL SEIZURES, NOT INTRACTABLE, WITHOUT STATUS EPILEPTICUS (H): ICD-10-CM

## 2021-02-05 LAB — INR PPP: 2.5 (ref 0.9–1.1)

## 2021-02-05 NOTE — PROCEDURES
" SLEEP STUDY INTERPRETATION  DIAGNOSTIC POLYSOMNOGRAPHY REPORT      Patient: NAYLA WEST  YOB: 1960  Study Date: 2/3/2021  MRN: [id number]  Referring Provider: Self  Ordering Provider: Kaley Baron MD    Indications for Polysomnography: The patient is a 60 year old Female who is 5' 7\" and weighs 316.0 lbs. Her BMI is 49.6, [place] sleepiness scale 5 and neck circumference is 0 cm. She had a diagnostic polysomnogram conducted on March 25, 2015.  Her BMI at that time was 49.4.  The study is limited because the total sleep time is only 205 minutes and no REM sleep was captured.  The AHI in this limited study was 1.8.  Patient reports that she continues to snore per her  has not reported apneic events.  Medical history is significant for morbid obesity with a BMI of about 52 (patient reported weight 327 pounds, height 5 foot 6 inches).  She has hypertension which is controlled with medication.  She has anxiety and depression; she reports depression is controlled and anxiety has been mildly active lately.  She has no history of diabetes mellitus, myocardial infarction, or cardiac dysrhythmia.  Her medical chart reports pulmonary embolism with infarction.  A diagnostic polysomnogram was performed to evaluate for sleep apnea/hypoventilation/hypoxemia.    Polysomnogram Data: A full night polysomnogram recorded the standard physiologic parameters including EEG, EOG, EMG, ECG, nasal and oral airflow. Respiratory parameters of chest and abdominal movements were recorded with respiratory inductance plethysmography. Oxygen saturation was recorded by pulse oximetry. Hypopnea scoring rule used: 1B 4%.    Sleep Architecture:  The sleep efficiency was reduced due to frequent arousals and awakenings, but all sleep stages were captured.  Sleep disruption was related to recurrent respiratory obstruction and desaturations.  The total recording time of the polysomnogram was 434.3 minutes. The total sleep " time was 287.0 minutes. Sleep latency was decreased at 4.5 minutes with the use of a sleep aid (trazodone 100 mg). REM latency was 415.5 minutes. Arousal index was increased at 97.6 arousals per hour. Sleep efficiency was decreased at 66.1%. Wake after sleep onset was 142.0 minutes. The patient spent 45.1% of total sleep time in Stage N1, 42.0% in Stage N2, 9.8% in Stage N3, and 3.1% in REM. Time in REM supine was 9.0 minutes.    Respiration: Severe obstructive sleep apnea was demonstrated, with an AHI of 91.4 events per hour of sleep time.  There was no position or sleep stage predominance.  Hypoventilation was not present.  Oxyhemoglobin saturation monitoring is consistent with nocturnal hypoxemia (9.2 minutes less than or equal to 88%) due to the cumulative effect of episodic desaturations.    Events ? The polysomnogram revealed a presence of 79 obstructive, 35 central, and 2 mixed apneas resulting in an apnea index of 24.3 events per hour. There were 321 obstructive hypopneas and 0 central hypopneas resulting in an obstructive hypopnea index of 67.1 and central hypopnea index of 0 events per hour. The combined apnea/hypopnea index was 91.4 events per hour (central apnea/hypopnea index was 7.3 events per hour). The REM AHI was 60.0 events per hour. The supine AHI was 79.2 events per hour. The RERA index was 4.2 events per hour.  The RDI was 95.5 events per hour.    Snoring - was reported as mild to loud and intermittent.    Respiratory rate and pattern - was notable for normal respiratory rate and pattern aside from airway obstructions.    Sustained Sleep Associated Hypoventilation - Transcutaneous carbon dioxide monitoring was used, however significant hypoventilation was not present with a maximum change from 30.0 to 49.6 mmHg and 0 minutes at or greater than 55 mmHg.    Sleep Associated Hypoxemia - (Greater than 5 minutes O2 sat at or below 88%) was present. Baseline oxygen saturation was 92.5%. Lowest oxygen  saturation was 78.6%. Time spent less than or equal to 88% was 9.2 minutes. Time spent less than or equal to 89% was 21.6 minutes.    Movement Activity: Limb movements were associated with arousals following airway obstruction.  The patient was also noted to vocalize frequently during arousals after airway obstruction.    Periodic Limb Activity - There were 12 PLMs during the entire study. The PLM index was 2.5 movements per hour. The PLM Arousal Index was 0.4 per hour.    REM EMG Activity - Excessive muscle activity was not present.    Nocturnal Behavior - Abnormal sleep related behaviors were not noted during sleep.  The patient was noted to vocalize frequently during arousals after airway obstruction.    Bruxism - None apparent.    Cardiac Summary: Normal sinus rhythm  The average pulse rate was 69.9 bpm. The minimum pulse rate was 55.9 bpm while the maximum pulse rate was 95.9 bpm.  Arrhythmias were not noted.      Assessment:     Severe obstructive sleep apnea was demonstrated, with an AHI of 91.4 events per hour of sleep time.      Oxyhemoglobin saturation monitoring is consistent with nocturnal hypoxemia (9.2 minutes less than or equal to 88%) due to the cumulative effect of episodic desaturations.     Recommendations:    Recommend treatment with Auto?titrating PAP therapy with a range of 5 to 20 cmH2O. Recommend clinical follow up with sleep management team.    Advice regarding the risks of drowsy driving.    Suggest optimizing sleep schedule and avoiding sleep deprivation.    Weight management (if BMI > 30).    Diagnostic Codes:   Obstructive Sleep Apnea G47.33  Sleep Hypoxemia/Hypoventilation G47.36     2/3/2021 [facility] Sleep Study (316.0 lbs) - AHI 91.4, RDI 95.5, Supine AHI 79.2, REM AHI 60.0, Low O2 78.6%, Time Spent ?88% 9.2 minutes / Time Spent ?89% 21.6 minutes.     _____________________________________   Electronically Signed By:  Kaley Baron MD  2/5/2021

## 2021-02-09 LAB — SLPCOMP: NORMAL

## 2021-02-12 ENCOUNTER — AMBULATORY - HEALTHEAST (OUTPATIENT)
Dept: LAB | Facility: CLINIC | Age: 61
End: 2021-02-12

## 2021-02-12 ENCOUNTER — COMMUNICATION - HEALTHEAST (OUTPATIENT)
Dept: TELEHEALTH | Facility: CLINIC | Age: 61
End: 2021-02-12

## 2021-02-12 ENCOUNTER — COMMUNICATION - HEALTHEAST (OUTPATIENT)
Dept: ANTICOAGULATION | Facility: CLINIC | Age: 61
End: 2021-02-12

## 2021-02-12 DIAGNOSIS — Z86.711 HISTORY OF PULMONARY EMBOLISM: ICD-10-CM

## 2021-02-12 DIAGNOSIS — G40.209 LOCALIZATION-RELATED SYMPTOMATIC EPILEPSY AND EPILEPTIC SYNDROMES WITH COMPLEX PARTIAL SEIZURES, NOT INTRACTABLE, WITHOUT STATUS EPILEPTICUS (H): ICD-10-CM

## 2021-02-12 LAB
CARBAMAZEPINE SERPL-MCNC: 7.8 UG/ML (ref 4–12)
INR PPP: 3.4 (ref 0.9–1.1)

## 2021-02-14 LAB — LAMOTRIGINE SERPL-MCNC: 6.4 UG/ML (ref 2.5–15)

## 2021-02-17 ENCOUNTER — COMMUNICATION - HEALTHEAST (OUTPATIENT)
Dept: ADMINISTRATIVE | Facility: CLINIC | Age: 61
End: 2021-02-17

## 2021-02-19 ENCOUNTER — OFFICE VISIT - HEALTHEAST (OUTPATIENT)
Dept: FAMILY MEDICINE | Facility: CLINIC | Age: 61
End: 2021-02-19

## 2021-02-19 ENCOUNTER — COMMUNICATION - HEALTHEAST (OUTPATIENT)
Dept: FAMILY MEDICINE | Facility: CLINIC | Age: 61
End: 2021-02-19

## 2021-02-19 DIAGNOSIS — H10.13 ALLERGIC CONJUNCTIVITIS, BILATERAL: ICD-10-CM

## 2021-02-19 DIAGNOSIS — R29.6 FALLS FREQUENTLY: ICD-10-CM

## 2021-02-19 ASSESSMENT — MIFFLIN-ST. JEOR: SCORE: 2123.88

## 2021-02-23 ENCOUNTER — RECORDS - HEALTHEAST (OUTPATIENT)
Dept: ADMINISTRATIVE | Facility: OTHER | Age: 61
End: 2021-02-23

## 2021-02-23 VITALS — WEIGHT: 293 LBS | HEIGHT: 66 IN | BODY MASS INDEX: 47.09 KG/M2

## 2021-02-23 RX ORDER — ROSUVASTATIN CALCIUM 20 MG/1
20 TABLET, COATED ORAL
COMMUNITY
Start: 2020-01-09 | End: 2021-10-14

## 2021-02-23 RX ORDER — WARFARIN SODIUM 10 MG/1
TABLET ORAL DAILY
COMMUNITY
End: 2021-08-20

## 2021-02-23 RX ORDER — OLOPATADINE HYDROCHLORIDE 1 MG/ML
1 SOLUTION/ DROPS OPHTHALMIC
COMMUNITY
Start: 2020-05-18 | End: 2021-08-06

## 2021-02-23 RX ORDER — LANOLIN ALCOHOL/MO/W.PET/CERES
6 CREAM (GRAM) TOPICAL
COMMUNITY
Start: 2020-12-01 | End: 2021-07-13

## 2021-02-23 RX ORDER — QUETIAPINE FUMARATE 200 MG/1
200 TABLET, FILM COATED ORAL
COMMUNITY
Start: 2020-12-01 | End: 2021-07-13

## 2021-02-23 RX ORDER — SENNOSIDES 8.6 MG
CAPSULE ORAL
COMMUNITY
Start: 2020-05-05 | End: 2021-08-06

## 2021-02-23 RX ORDER — LAMOTRIGINE 25 MG/1
TABLET ORAL
COMMUNITY
Start: 2020-11-30 | End: 2021-07-20

## 2021-02-23 ASSESSMENT — MIFFLIN-ST. JEOR: SCORE: 2115.37

## 2021-02-23 NOTE — PROGRESS NOTES
Valentina is a 61 year old who is being evaluated via a billable video visit.      How would you like to obtain your AVS? Mail a copy  If the video visit is dropped, the invitation should be resent by: Text to cell phone: [phone]  Will anyone else be joining your video visit? No      Video Video Start Time: The video visit was converted to a telephone call when the patient was unable to connect.  I called her cell phone and [facility]  Out[facility] Encounter, Established Patient      Name: Valentina Olmedo MRN# [id number]   Age: 61 year old YOB: 1960     Date of Visit: February 25, 2021  Primary care provider: Donald Rooney    Assessment and Plan:     1. CHRIS (obstructive sleep apnea)  Today I submitted a prescription for AutoPAP therapy, 5 to 20 cm water pressure, patient's choice of PAP mask, and all associated consumable supplies.  I counseled the patient to use the treatment nightly and throughout the entire sleep period.  The Sleep Therapy Management (STM) group will contact the patient as per protocol for support and troubleshooting.  Patient will be asked to schedule a follow-up appointment in 8 weeks to determine clinical progress with the therapy and assess adherence.    2. Essential hypertension  CHRIS is an independent risk factor for hypertension; risk for hypertension is related to severity of sleep apnea and episodic nocturnal hypoxemia.  Untreated CHRIS can cause loss of blood pressure dipping during nighttime sleep.  PAP therapy can improve blood pressure control in individuals with co-existing CHRIS and hypertension.    3. Morbid obesity (H)  Patients with obesity are at higher risk for sleep apnea due to fat deposition in the posterior airway and tongue.  Sleep disruption associated with untreated sleep disorders (including, but not limited to, sleep apnea) can cause hormonal disruption that  predisposes individuals to gain weight.  Weight loss can lead to improvement in sleep apnea severity and sleep quality.  Nocturnal hypoventilation may be a consideration in some individuals with obesity.    History:   Valentina Olmedo is a 61 year old female whose telephone visit today is to review the results of the diagnostic polysomnogram conducted on February 3, 2021.    She had a previous diagnostic polysomnogram conducted on March 25, 2015.  Her BMI at that time was 49.4.  The study is limited because the total sleep time is only 205 minutes and no REM sleep was captured.  The AHI in this limited study was 1.8.  Patient reports that she continues to snore per her  has not reported apneic events.  Medical history is significant for morbid obesity with a BMI of about 52 (patient reported weight 327 pounds, height 5 foot 6 inches).  She has hypertension which is controlled with medication.  She has anxiety and depression; she reports depression is controlled and anxiety has been mildly active lately.  She has no history of diabetes mellitus, myocardial infarction, or cardiac dysrhythmia.  Her medical chart reports pulmonary embolism with infarction.     I reviewed the results of the current sleep study with the patient.  Although sleep efficiency was reduced at 66%, all sleep stages were captured.  Sleep disruption was related to recurrent respiratory obstruction and desaturations.  Very severe obstructive sleep apnea was demonstrated with an AHI of 91 events per hour of sleep time.  There was no position or sleep stage predominance.  Hypoventilation was not present.  The oxyhemoglobin saturation sheri was 78.6%.  Oxyhemoglobin saturation monitoring is consistent with nocturnal hypoxemia (9.2 minutes less than or equal to 88%).  This appears to be due to the cumulative effect of episodic desaturations.  Limb movements were associated with arousals following airway obstruction.  The patient was noted to  "vocalize frequently during arousals after airway obstruction.    Patient notes that she had trouble with using CPAP on 2 occasions when it was attempted prior to her sleep study.  She is willing to try CPAP at home.  She would prefer to use a nasal mask as she is a nose breather.    Medications:     Current Outpatient Medications   Medication Sig     albuterol (ALBUTEROL) 108 (90 BASE) MCG/ACT inhaler Inhale 2 puffs into the lungs     Calcium Carbonate Antacid (TUMS LASTING EFFECTS PO) Take 2 tablets by mouth two times daily     carBAMazepine (TEGRETOL) 200 MG tablet Take 2 tablets (400 mg) by mouth 2 times daily     cholecalciferol (VITAMIN D-3 SUPER STRENGTH) 2000 UNITS tablet Take 2,000 Units by mouth     EPINEPHrine 0.15 MG/0.15ML SOAJ Inject 1 each into the muscle     lamoTRIgine (LAMICTAL) 25 MG tablet      levothyroxine (SYNTHROID, LEVOTHROID) 125 MCG tablet Take 2 tablets (250 mcg) by mouth every morning (before breakfast)     melatonin 3 MG tablet Take 6 mg by mouth     olopatadine (PATANOL) 0.1 % ophthalmic solution 1 drop     omeprazole (PRILOSEC) 20 MG DR capsule Take 20 mg by mouth     oxybutynin (DITROPAN-XL) 10 MG 24 hr tablet Take one tablet by mouth twice daily.     QUEtiapine (SEROQUEL) 200 MG tablet Take 200 mg by mouth     rosuvastatin (CRESTOR) 20 MG tablet Take 20 mg by mouth     Sennosides (SENNA) 8.6 MG CAPS      warfarin ANTICOAGULANT (COUMADIN) 10 MG tablet Take by mouth daily 10 mg Daily except on Tuesdays 12.5 mg     No current facility-administered medications for this visit.         Allergies   Allergen Reactions     Aspirin Shortness Of Breath     Bees Anaphylaxis     Latex Itching     Phenobarbital      Vistaril [Hydroxyzine] Other (See Comments)     Sluggish, unable to wake up     Gabapentin Rash       Past Medical History:     Past Medical History:   Diagnosis Date     Anxiety      Depressive disorder      Hypertension         Physical Examination:   Ht 1.676 m (5' 6\")   Wt (!) " 152.9 kg (337 lb)   BMI 54.39 kg/m         Kaley Baron MD   2/25/2021   [address], [address]  [place] [address] [phone]    Copy to: Donald Rooney

## 2021-02-23 NOTE — PATIENT INSTRUCTIONS
Your BMI is Body mass index is 54.39 kg/m .  Weight management is a personal decision.  If you are interested in exploring weight loss strategies, the following discussion covers the approaches that may be successful. Body mass index (BMI) is one way to tell whether you are at a healthy weight, overweight, or obese. It measures your weight in relation to your height.  A BMI of 18.5 to 24.9 is in the healthy range. A person with a BMI of 25 to 29.9 is considered overweight, and someone with a BMI of 30 or greater is considered obese. More than two-thirds of American adults are considered overweight or obese.  Being overweight or obese increases the risk for further weight gain. Excess weight may lead to heart disease and diabetes.  Creating and following plans for healthy eating and physical activity may help you improve your health.  Weight control is part of healthy lifestyle and includes exercise, emotional health, and healthy eating habits. Careful eating habits lifelong are the mainstay of weight control. Though there are significant health benefits from weight loss, long-term weight loss with diet alone may be very difficult to achieve- studies show long-term success with dietary management in less than 10% of people. Attaining a healthy weight may be especially difficult to achieve in those with severe obesity. In some cases, medications, devices and surgical management might be considered.  What can you do?  If you are overweight or obese and are interested in methods for weight loss, you should discuss this with your provider.     Consider reducing daily calorie intake by 500 calories.     Keep a food journal.     Avoiding skipping meals, consider cutting portions instead.    Diet combined with exercise helps maintain muscle while optimizing fat loss. Strength training is particularly important for building and maintaining muscle mass. Exercise helps reduce stress, increase energy, and improves fitness.  Increasing exercise without diet control, however, may not burn enough calories to loose weight.       Start walking three days a week 10-20 minutes at a time    Work towards walking thirty minutes five days a week     Eventually, increase the speed of your walking for 1-2 minutes at time    In addition, we recommend that you review healthy lifestyles and methods for weight loss available through the National Institutes of Health patient information sites:  http://win.niddk.nih.gov/publications/index.htm    And look into health and wellness programs that may be available through your health insurance provider, employer, local community center, or onur club.    Weight management plan: Patient was referred to their PCP to discuss a diet and exercise plan.

## 2021-02-25 ENCOUNTER — VIRTUAL VISIT (OUTPATIENT)
Dept: SLEEP MEDICINE | Facility: CLINIC | Age: 61
End: 2021-02-25
Payer: MEDICARE

## 2021-02-25 DIAGNOSIS — G47.33 OSA (OBSTRUCTIVE SLEEP APNEA): Primary | ICD-10-CM

## 2021-02-25 DIAGNOSIS — I10 ESSENTIAL HYPERTENSION: ICD-10-CM

## 2021-02-25 DIAGNOSIS — E66.01 MORBID OBESITY (H): ICD-10-CM

## 2021-02-25 PROCEDURE — 99442 PR PHYSICIAN TELEPHONE EVALUATION 11-20 MIN: CPT | Mod: 95 | Performed by: PEDIATRICS

## 2021-02-26 ENCOUNTER — AMBULATORY - HEALTHEAST (OUTPATIENT)
Dept: LAB | Facility: CLINIC | Age: 61
End: 2021-02-26

## 2021-02-26 ENCOUNTER — COMMUNICATION - HEALTHEAST (OUTPATIENT)
Dept: ANTICOAGULATION | Facility: CLINIC | Age: 61
End: 2021-02-26

## 2021-02-26 DIAGNOSIS — Z86.711 HISTORY OF PULMONARY EMBOLISM: ICD-10-CM

## 2021-02-26 LAB — INR PPP: 2.9 (ref 0.9–1.1)

## 2021-03-02 ENCOUNTER — COMMUNICATION - HEALTHEAST (OUTPATIENT)
Dept: SCHEDULING | Facility: CLINIC | Age: 61
End: 2021-03-02

## 2021-03-02 ENCOUNTER — DOCUMENTATION ONLY (OUTPATIENT)
Dept: SLEEP MEDICINE | Facility: CLINIC | Age: 61
End: 2021-03-02

## 2021-03-02 NOTE — PROGRESS NOTES
Patient was offered choice of vendor and chose [facility].  Patient Valentina Olmedo was set up at [place]  on March 2, 2021. Patient received a Resmed AirSense 10 Auto. Pressures were set at 5-20 cm H2O.   Patient s ramp is 5 cm H2O for Auto and FLEX/EPR is EPR, 2.  Patient received a Resmed Mask name: AIRFIT P10  Pillow mask size fitpack for her, heated tubing and heated humidifier.  Patient does need to meet compliance.   Pema Paniagua

## 2021-03-05 ENCOUNTER — DOCUMENTATION ONLY (OUTPATIENT)
Dept: SLEEP MEDICINE | Facility: CLINIC | Age: 61
End: 2021-03-05

## 2021-03-08 ENCOUNTER — TELEPHONE (OUTPATIENT)
Dept: SLEEP MEDICINE | Facility: CLINIC | Age: 61
End: 2021-03-08

## 2021-03-11 ENCOUNTER — COMMUNICATION - HEALTHEAST (OUTPATIENT)
Dept: ANTICOAGULATION | Facility: CLINIC | Age: 61
End: 2021-03-11

## 2021-03-11 ENCOUNTER — AMBULATORY - HEALTHEAST (OUTPATIENT)
Dept: LAB | Facility: CLINIC | Age: 61
End: 2021-03-11

## 2021-03-11 DIAGNOSIS — Z86.711 HISTORY OF PULMONARY EMBOLISM: ICD-10-CM

## 2021-03-11 LAB — INR PPP: 4.2 (ref 0.9–1.1)

## 2021-03-15 ENCOUNTER — AMBULATORY - HEALTHEAST (OUTPATIENT)
Dept: NURSING | Facility: CLINIC | Age: 61
End: 2021-03-15

## 2021-03-16 ENCOUNTER — DOCUMENTATION ONLY (OUTPATIENT)
Dept: SLEEP MEDICINE | Facility: CLINIC | Age: 61
End: 2021-03-16

## 2021-03-16 NOTE — PROGRESS NOTES
14  DAY STM VISIT    Diagnostic AHI: 91.4    PSG    Subjective measures:  Patient feels she is removing the mask due to feeling like she has to work hard to exhale against pressure.  She finds the mask uncomfortable.  She does not like the tube that goes by her face. She will reach out to [facility]       Assessment: Pt not meeting objective benchmarks for compliance  Patient failing following subjective benchmarks: pressure issues    Action plan: pt to have 30 day STM visit  Changed EPR to  3.      Device type: Auto-CPAP    PAP settings: CPAP min 5.0 cm  H20       CPAP max 20.0 cm  H20      95th% pressure 10.5 cm  H20        RESMED EPR level Setting: TWO    RESMED Soft response setting:  OFF    Mask type:  Nasal Pillows    Objective measures: 14 day rolling measures      Compliance  7 %      Leak  8.6  lpm  last  upload      AHI 1.73   last  upload      Average number of minutes 62      Objective measure goal  Compliance   Goal >70%  Leak   Goal < 24 lpm  AHI  Goal < 5  Usage  Goal >240        Total time spent on accessing and interpreting remote patient PAP therapy data  12 minutes    Total time spent counseling, coaching  and reviewing PAP therapy data with patient  5 minutes    [id number]  [id number]  no (3 day STM)

## 2021-03-18 ENCOUNTER — AMBULATORY - HEALTHEAST (OUTPATIENT)
Dept: LAB | Facility: CLINIC | Age: 61
End: 2021-03-18

## 2021-03-18 ENCOUNTER — COMMUNICATION - HEALTHEAST (OUTPATIENT)
Dept: ANTICOAGULATION | Facility: CLINIC | Age: 61
End: 2021-03-18

## 2021-03-18 DIAGNOSIS — Z86.711 HISTORY OF PULMONARY EMBOLISM: ICD-10-CM

## 2021-03-18 LAB — INR PPP: 1.8 (ref 0.9–1.1)

## 2021-03-22 ENCOUNTER — DOCUMENTATION ONLY (OUTPATIENT)
Dept: SLEEP MEDICINE | Facility: CLINIC | Age: 61
End: 2021-03-22

## 2021-03-22 NOTE — PROGRESS NOTES
STM pt call in     Diagnostic AHI: 91.4    PSG    Subjective measures: Patient is still struggling with pressures.  She has been able to fall asleep with the device but is waking up to too much pressure.       Assessment: Pt not meeting objective benchmarks for compliance  Patient failing following subjective benchmarks: pressure issues    Action plan: changed device to soft response.  Pt has [facility] 30 day follow up scheduled.       Device type: Auto-CPAP    PAP settings: CPAP min 5.0 cm  H20       CPAP max 20.0 cm  H20           95th% pressure 9.9 cm  H20        RESMED EPR level Setting: THREE    RESMED Soft response setting:  OFF    Mask type:  Nasal Pillows    Objective measures: 14 day rolling measures      Compliance  14 %      Leak  8.03  lpm  last  upload      AHI 1.49   last  upload      Average number of minutes 124      Objective measure goal  Compliance   Goal >70%  Leak   Goal < 24 lpm  AHI  Goal < 5  Usage  Goal >240        Total time spent on accessing and interpreting remote patient PAP therapy data  10 minutes    Total time spent counseling, coaching  and reviewing PAP therapy data with patient  3 minutes    [id number]

## 2021-03-23 ENCOUNTER — DOCUMENTATION ONLY (OUTPATIENT)
Dept: SLEEP MEDICINE | Facility: CLINIC | Age: 61
End: 2021-03-23

## 2021-03-23 ENCOUNTER — OFFICE VISIT - HEALTHEAST (OUTPATIENT)
Dept: FAMILY MEDICINE | Facility: CLINIC | Age: 61
End: 2021-03-23

## 2021-03-23 DIAGNOSIS — M79.641 PAIN IN BOTH HANDS: ICD-10-CM

## 2021-03-23 DIAGNOSIS — G89.29 CHRONIC RIGHT SHOULDER PAIN: ICD-10-CM

## 2021-03-23 DIAGNOSIS — M79.642 PAIN IN BOTH HANDS: ICD-10-CM

## 2021-03-23 DIAGNOSIS — G47.33 OSA (OBSTRUCTIVE SLEEP APNEA): Primary | ICD-10-CM

## 2021-03-23 DIAGNOSIS — M47.817 SPONDYLOSIS OF LUMBOSACRAL REGION, UNSPECIFIED SPINAL OSTEOARTHRITIS COMPLICATION STATUS: ICD-10-CM

## 2021-03-23 DIAGNOSIS — M25.511 CHRONIC RIGHT SHOULDER PAIN: ICD-10-CM

## 2021-03-23 DIAGNOSIS — M47.892 OTHER OSTEOARTHRITIS OF SPINE, CERVICAL REGION: ICD-10-CM

## 2021-03-23 ASSESSMENT — ANXIETY QUESTIONNAIRES
3. WORRYING TOO MUCH ABOUT DIFFERENT THINGS: MORE THAN HALF THE DAYS
IF YOU CHECKED OFF ANY PROBLEMS ON THIS QUESTIONNAIRE, HOW DIFFICULT HAVE THESE PROBLEMS MADE IT FOR YOU TO DO YOUR WORK, TAKE CARE OF THINGS AT HOME, OR GET ALONG WITH OTHER PEOPLE: SOMEWHAT DIFFICULT
7. FEELING AFRAID AS IF SOMETHING AWFUL MIGHT HAPPEN: MORE THAN HALF THE DAYS
5. BEING SO RESTLESS THAT IT IS HARD TO SIT STILL: SEVERAL DAYS
6. BECOMING EASILY ANNOYED OR IRRITABLE: MORE THAN HALF THE DAYS
GAD7 TOTAL SCORE: 13
4. TROUBLE RELAXING: MORE THAN HALF THE DAYS
1. FEELING NERVOUS, ANXIOUS, OR ON EDGE: MORE THAN HALF THE DAYS
2. NOT BEING ABLE TO STOP OR CONTROL WORRYING: MORE THAN HALF THE DAYS

## 2021-03-23 ASSESSMENT — MIFFLIN-ST. JEOR: SCORE: 2115.37

## 2021-03-23 ASSESSMENT — PATIENT HEALTH QUESTIONNAIRE - PHQ9: SUM OF ALL RESPONSES TO PHQ QUESTIONS 1-9: 10

## 2021-03-23 NOTE — PROGRESS NOTES
STM recheck     Diagnostic AHI: 91.4    PSG    Subjective measures:   Pt awoke to find herself to chewing on her nose piece.    She is feeling that the pressures are now feeling better and she is ok with narrowing pressures for comfort.     Assessment: Pt not meeting objective benchmarks for compliance  Patient failing following subjective benchmarks: working on comfort measures.     Action plan: pended order placed to provider for pressure change to 6-9 cm H20   Pt to check to make sure she did not put a hole in her mask last night.      Device type: Auto-CPAP    PAP settings: CPAP min 5.0 cm  H20       CPAP max 20.0 cm  H20         95th% pressure 10.1 cm  H20        RESMED EPR level Setting: THREE    RESMED Soft response setting:  ON    Mask type:  Nasal Pillows    Objective measures: 14 day rolling measures      Compliance  21 %      Leak  7.97  lpm  last  upload      AHI 1.84   last  upload      Average number of minutes 146      Objective measure goal  Compliance   Goal >70%  Leak   Goal < 24 lpm  AHI  Goal < 5  Usage  Goal >240        Total time spent on accessing and interpreting remote patient PAP therapy data  10 minutes    Total time spent counseling, coaching  and reviewing PAP therapy data with patient  5 minutes    [id number]

## 2021-03-31 ENCOUNTER — COMMUNICATION - HEALTHEAST (OUTPATIENT)
Dept: FAMILY MEDICINE | Facility: CLINIC | Age: 61
End: 2021-03-31

## 2021-03-31 DIAGNOSIS — I26.99 PULMONARY EMBOLISM WITH INFARCTION (H): ICD-10-CM

## 2021-03-31 DIAGNOSIS — Z79.01 LONG TERM (CURRENT) USE OF ANTICOAGULANTS: ICD-10-CM

## 2021-04-01 ENCOUNTER — AMBULATORY - HEALTHEAST (OUTPATIENT)
Dept: LAB | Facility: CLINIC | Age: 61
End: 2021-04-01

## 2021-04-01 ENCOUNTER — OFFICE VISIT - HEALTHEAST (OUTPATIENT)
Dept: PHYSICAL THERAPY | Facility: REHABILITATION | Age: 61
End: 2021-04-01

## 2021-04-01 ENCOUNTER — COMMUNICATION - HEALTHEAST (OUTPATIENT)
Dept: ANTICOAGULATION | Facility: CLINIC | Age: 61
End: 2021-04-01

## 2021-04-01 DIAGNOSIS — R26.9 ABNORMALITY OF GAIT: ICD-10-CM

## 2021-04-01 DIAGNOSIS — R53.81 PHYSICAL DECONDITIONING: ICD-10-CM

## 2021-04-01 DIAGNOSIS — Z86.711 HISTORY OF PULMONARY EMBOLISM: ICD-10-CM

## 2021-04-01 DIAGNOSIS — M80.00XG AGE-RELATED OSTEOPOROSIS WITH CURRENT PATHOLOGICAL FRACTURE WITH DELAYED HEALING, SUBSEQUENT ENCOUNTER: ICD-10-CM

## 2021-04-01 DIAGNOSIS — M62.81 GENERALIZED MUSCLE WEAKNESS: ICD-10-CM

## 2021-04-01 DIAGNOSIS — M54.41 CHRONIC BILATERAL LOW BACK PAIN WITH RIGHT-SIDED SCIATICA: ICD-10-CM

## 2021-04-01 DIAGNOSIS — G89.29 CHRONIC BILATERAL LOW BACK PAIN WITH RIGHT-SIDED SCIATICA: ICD-10-CM

## 2021-04-01 DIAGNOSIS — R29.818 DIFFICULTY BALANCING: ICD-10-CM

## 2021-04-01 DIAGNOSIS — Z87.81 HISTORY OF VERTEBRAL COMPRESSION FRACTURE: ICD-10-CM

## 2021-04-01 LAB — INR PPP: 2.7 (ref 0.9–1.1)

## 2021-04-02 ENCOUNTER — DOCUMENTATION ONLY (OUTPATIENT)
Dept: SLEEP MEDICINE | Facility: CLINIC | Age: 61
End: 2021-04-02
Payer: MEDICARE

## 2021-04-02 NOTE — PROGRESS NOTES
30 DAY STM VISIT    Diagnostic AHI: 91.4  PSG    Subjective measures:   Patient takes mask off during the night. Patient has allergies and it it is difficult to breathe against the pressure.  Patient is aware of compliance.     Assessment: Pt not meeting objective benchmarks for compliance  Patient failing following subjective benchmarks: mask discomfort  and congestion  Action plan: 2 week STM recheck appt scheduled  Patient has scheduled a follow up visit with Dr. Enciso on 4/25/2021.   Device type: Auto-CPAP  PAP settings: CPAP min 6.0 cm  H20     CPAP max 9.0 cm  H20    95th% pressure 9.3 cm  H20      RESMED EPR level Setting: THREE    RESMED Soft response setting:  ON  Mask type:  Nasal Pillows  Objective measures: 14 day rolling measures      Compliance  57 %      Leak  11.04 lpm  last  upload      AHI 7.34   last  upload      Average number of minutes 231      Objective measure goal  Compliance   Goal >70%  Leak   Goal < 24 lpm  AHI  Goal < 5  Usage  Goal >240        Total time spent on accessing and interpreting remote patient PAP therapy data  14 minutes    Total time spent counseling, coaching  and reviewing PAP therapy data with patient  6 minutes     [id number] this call  [id number] no  at 3 or 14 day [facility]

## 2021-04-05 ENCOUNTER — AMBULATORY - HEALTHEAST (OUTPATIENT)
Dept: NURSING | Facility: CLINIC | Age: 61
End: 2021-04-05

## 2021-04-06 ENCOUNTER — OFFICE VISIT - HEALTHEAST (OUTPATIENT)
Dept: BEHAVIORAL HEALTH | Facility: CLINIC | Age: 61
End: 2021-04-06

## 2021-04-06 DIAGNOSIS — Z79.899 HIGH RISK MEDICATION USE: ICD-10-CM

## 2021-04-06 DIAGNOSIS — F17.218 NICOTINE DEPENDENCE, CIGARETTES, WITH OTHER NICOTINE-INDUCED DISORDERS: ICD-10-CM

## 2021-04-06 DIAGNOSIS — F25.1 SCHIZOAFFECTIVE DISORDER, DEPRESSIVE TYPE (H): ICD-10-CM

## 2021-04-06 DIAGNOSIS — G47.00 INSOMNIA, UNSPECIFIED TYPE: ICD-10-CM

## 2021-04-06 DIAGNOSIS — G40.802 OTHER EPILEPSY WITHOUT STATUS EPILEPTICUS, NOT INTRACTABLE (H): ICD-10-CM

## 2021-04-06 DIAGNOSIS — G47.33 OSA (OBSTRUCTIVE SLEEP APNEA): ICD-10-CM

## 2021-04-07 ENCOUNTER — COMMUNICATION - HEALTHEAST (OUTPATIENT)
Dept: BEHAVIORAL HEALTH | Facility: CLINIC | Age: 61
End: 2021-04-07

## 2021-04-07 ENCOUNTER — OFFICE VISIT - HEALTHEAST (OUTPATIENT)
Dept: BEHAVIORAL HEALTH | Facility: CLINIC | Age: 61
End: 2021-04-07

## 2021-04-07 ENCOUNTER — DOCUMENTATION ONLY (OUTPATIENT)
Dept: SLEEP MEDICINE | Facility: CLINIC | Age: 61
End: 2021-04-07

## 2021-04-07 DIAGNOSIS — F60.3 BORDERLINE PERSONALITY DISORDER (H): ICD-10-CM

## 2021-04-07 DIAGNOSIS — F25.1 SCHIZOAFFECTIVE DISORDER, DEPRESSIVE TYPE (H): ICD-10-CM

## 2021-04-07 DIAGNOSIS — F41.1 GENERALIZED ANXIETY DISORDER: ICD-10-CM

## 2021-04-07 NOTE — PROGRESS NOTES
DATE: 04/07/2021  LOCATION OF MASK FITTING: [place] [facility]  REASON FOR MASK FITTING: PATIENT'S MASK KEEPS FALLING OFF AT NIGHT.   DISCUSSED/VIEWED THE FOLLOWING MASKS:  1. NUANCE PRO GEL FITPACK  2. AIRFIT N20 SMALL  3. AIRFIT N30I FITPACK    MASK AND SIZE SELECTED: NUANCE PRO GEL FITPACK  MASK CLARIFICATION NEEDED: NO

## 2021-04-13 ENCOUNTER — RECORDS - HEALTHEAST (OUTPATIENT)
Dept: ADMINISTRATIVE | Facility: OTHER | Age: 61
End: 2021-04-13

## 2021-04-15 ENCOUNTER — AMBULATORY - HEALTHEAST (OUTPATIENT)
Dept: LAB | Facility: CLINIC | Age: 61
End: 2021-04-15

## 2021-04-15 ENCOUNTER — OFFICE VISIT - HEALTHEAST (OUTPATIENT)
Dept: FAMILY MEDICINE | Facility: CLINIC | Age: 61
End: 2021-04-15

## 2021-04-15 ENCOUNTER — COMMUNICATION - HEALTHEAST (OUTPATIENT)
Dept: ANTICOAGULATION | Facility: CLINIC | Age: 61
End: 2021-04-15

## 2021-04-15 DIAGNOSIS — Z86.711 HISTORY OF PULMONARY EMBOLISM: ICD-10-CM

## 2021-04-15 DIAGNOSIS — Z20.822 EXPOSURE TO COVID-19 VIRUS: ICD-10-CM

## 2021-04-15 LAB — INR PPP: 1.9 (ref 0.9–1.1)

## 2021-04-16 ENCOUNTER — AMBULATORY - HEALTHEAST (OUTPATIENT)
Dept: FAMILY MEDICINE | Facility: CLINIC | Age: 61
End: 2021-04-16

## 2021-04-16 ENCOUNTER — DOCUMENTATION ONLY (OUTPATIENT)
Dept: SLEEP MEDICINE | Facility: CLINIC | Age: 61
End: 2021-04-16
Payer: MEDICARE

## 2021-04-16 DIAGNOSIS — Z20.822 EXPOSURE TO COVID-19 VIRUS: ICD-10-CM

## 2021-04-17 ENCOUNTER — COMMUNICATION - HEALTHEAST (OUTPATIENT)
Dept: FAMILY MEDICINE | Facility: CLINIC | Age: 61
End: 2021-04-17

## 2021-04-19 ENCOUNTER — AMBULATORY - HEALTHEAST (OUTPATIENT)
Dept: FAMILY MEDICINE | Facility: CLINIC | Age: 61
End: 2021-04-19

## 2021-04-19 DIAGNOSIS — U07.1 CLINICAL DIAGNOSIS OF COVID-19: ICD-10-CM

## 2021-04-21 ENCOUNTER — COMMUNICATION - HEALTHEAST (OUTPATIENT)
Dept: BEHAVIORAL HEALTH | Facility: CLINIC | Age: 61
End: 2021-04-21

## 2021-04-21 ENCOUNTER — DOCUMENTATION ONLY (OUTPATIENT)
Dept: SLEEP MEDICINE | Facility: CLINIC | Age: 61
End: 2021-04-21
Payer: MEDICARE

## 2021-04-21 ENCOUNTER — OFFICE VISIT - HEALTHEAST (OUTPATIENT)
Dept: BEHAVIORAL HEALTH | Facility: CLINIC | Age: 61
End: 2021-04-21

## 2021-04-21 DIAGNOSIS — F60.3 BORDERLINE PERSONALITY DISORDER (H): ICD-10-CM

## 2021-04-21 DIAGNOSIS — F25.1 SCHIZOAFFECTIVE DISORDER, DEPRESSIVE TYPE (H): ICD-10-CM

## 2021-04-21 DIAGNOSIS — F41.1 GENERALIZED ANXIETY DISORDER: ICD-10-CM

## 2021-04-21 NOTE — PROGRESS NOTES
[facility] Recheck:  Patient has met compliance but now she has co-vid.  Patient AHI elevated at 10 and she does not want to change pressures. Patient AHI has improved since her sleep study when her AHI was 90.  Patient will call in the futre once she gets over Co-Vid 19.

## 2021-05-04 ENCOUNTER — COMMUNICATION - HEALTHEAST (OUTPATIENT)
Dept: ANTICOAGULATION | Facility: CLINIC | Age: 61
End: 2021-05-04

## 2021-05-04 ENCOUNTER — AMBULATORY - HEALTHEAST (OUTPATIENT)
Dept: LAB | Facility: CLINIC | Age: 61
End: 2021-05-04

## 2021-05-04 DIAGNOSIS — Z86.711 HISTORY OF PULMONARY EMBOLISM: ICD-10-CM

## 2021-05-04 LAB — INR PPP: 2.4 (ref 0.9–1.1)

## 2021-05-05 ENCOUNTER — RECORDS - HEALTHEAST (OUTPATIENT)
Dept: ADMINISTRATIVE | Facility: OTHER | Age: 61
End: 2021-05-05

## 2021-05-05 ENCOUNTER — OFFICE VISIT - HEALTHEAST (OUTPATIENT)
Dept: BEHAVIORAL HEALTH | Facility: CLINIC | Age: 61
End: 2021-05-05

## 2021-05-05 DIAGNOSIS — F41.1 GENERALIZED ANXIETY DISORDER: ICD-10-CM

## 2021-05-05 DIAGNOSIS — F25.1 SCHIZOAFFECTIVE DISORDER, DEPRESSIVE TYPE (H): ICD-10-CM

## 2021-05-05 DIAGNOSIS — F60.3 BORDERLINE PERSONALITY DISORDER (H): ICD-10-CM

## 2021-05-05 ASSESSMENT — ANXIETY QUESTIONNAIRES
1. FEELING NERVOUS, ANXIOUS, OR ON EDGE: SEVERAL DAYS
5. BEING SO RESTLESS THAT IT IS HARD TO SIT STILL: SEVERAL DAYS
3. WORRYING TOO MUCH ABOUT DIFFERENT THINGS: MORE THAN HALF THE DAYS
2. NOT BEING ABLE TO STOP OR CONTROL WORRYING: SEVERAL DAYS
6. BECOMING EASILY ANNOYED OR IRRITABLE: MORE THAN HALF THE DAYS
4. TROUBLE RELAXING: SEVERAL DAYS
7. FEELING AFRAID AS IF SOMETHING AWFUL MIGHT HAPPEN: SEVERAL DAYS
IF YOU CHECKED OFF ANY PROBLEMS ON THIS QUESTIONNAIRE, HOW DIFFICULT HAVE THESE PROBLEMS MADE IT FOR YOU TO DO YOUR WORK, TAKE CARE OF THINGS AT HOME, OR GET ALONG WITH OTHER PEOPLE: SOMEWHAT DIFFICULT

## 2021-05-06 ENCOUNTER — OFFICE VISIT - HEALTHEAST (OUTPATIENT)
Dept: FAMILY MEDICINE | Facility: CLINIC | Age: 61
End: 2021-05-06

## 2021-05-06 DIAGNOSIS — J01.40 ACUTE NON-RECURRENT PANSINUSITIS: ICD-10-CM

## 2021-05-06 DIAGNOSIS — J30.1 SEASONAL ALLERGIC RHINITIS DUE TO POLLEN: ICD-10-CM

## 2021-05-06 ASSESSMENT — MIFFLIN-ST. JEOR: SCORE: 2139.19

## 2021-05-10 ENCOUNTER — AMBULATORY - HEALTHEAST (OUTPATIENT)
Dept: NURSING | Facility: CLINIC | Age: 61
End: 2021-05-10

## 2021-05-17 ENCOUNTER — HOSPITAL ENCOUNTER (OUTPATIENT)
Dept: MAMMOGRAPHY | Facility: CLINIC | Age: 61
Discharge: HOME | End: 2021-05-17
Attending: FAMILY MEDICINE
Payer: MEDICARE

## 2021-05-17 DIAGNOSIS — Z12.31 SCREENING MAMMOGRAM, ENCOUNTER FOR: ICD-10-CM

## 2021-05-18 ENCOUNTER — AMBULATORY - HEALTHEAST (OUTPATIENT)
Dept: LAB | Facility: CLINIC | Age: 61
End: 2021-05-18

## 2021-05-18 ENCOUNTER — COMMUNICATION - HEALTHEAST (OUTPATIENT)
Dept: ANTICOAGULATION | Facility: CLINIC | Age: 61
End: 2021-05-18

## 2021-05-18 DIAGNOSIS — Z86.711 HISTORY OF PULMONARY EMBOLISM: ICD-10-CM

## 2021-05-18 LAB — INR PPP: 3.4 (ref 0.9–1.1)

## 2021-05-24 NOTE — PATIENT INSTRUCTIONS

## 2021-05-24 NOTE — PROGRESS NOTES
"Valentina Olmedo is a 61 year old female being evaluated via a billable telephone visit.     \"This telephone visit will be conducted via a call between you and your physician/provider. We have found that certain health care needs can be provided without the need for an in-person visit or physical exam.  This service lets us provide the care you need with a telephone conversation.  If a prescription is necessary we can send it directly to your pharmacy.  If lab work is needed we can place an order for that and you can then stop by our lab to have the test done at a later time.\"    Telephone visits are billed at different rates depending on your insurance coverage.  Please reach out to your insurance provider with any questions.    Patient has given verbal consent for  a Telephone visit? No    What telephone number would you like your provider to contact at at:  [phone]     How would you like to obtain your AVS? Mail a copy    Gracie Whitten MA    Telephone Visit Details:     Telephone Visit Start Time: 2:04 PM    Telephone Visit End Time:2:16 PM    [facility]  Outpatient Sleep Medicine Encounter, Established Patient      Name: Valentina Olmedo MRN# [id number]   Age: 61 year old YOB: 1960     Date of Visit: May 25, 2021  Primary care provider: Promise Vanegas    Assessment and Plan:     1. CHRIS (obstructive sleep apnea)  I advised the patient to continue acclimating to PAP therapy.  The goal is to use the treatment nightly and throughout the entire sleep period.  She may benefit from getting a full face mask since she is currently having prominent allergy symptoms that interfere with nasal breathing.  Patient will contact her [employer] to arrange for this.  Follow up in 2 months for reassessment and to check adherence.  Based on her elevated AHI on the downloaded data, I will increase the upper limit of her AutoPap setting to 12 cm " water.  Patient is agreeable to this.    2. Seasonal allergic rhinitis, unspecified trigger  Suggested that she continue with over-the-counter allergy medication and consider an appointment with an allergy specialist since this problem has been ongoing.  Also recommended that she wash her bedding in hot water and consider allergy covers for her pillows and/or mattress to reduce dust mite exposure.    History:     Valentina Olmedo is a 61 year old female whose visit was conducted via telephone today.  Patient is prescribed AutoPap therapy for treatment of severe obstructive sleep apnea.  2/3/2021 Dx PSG AHI 91, SpO2 sheri 78.6%, 9.2 minutes <= 88%.     She reports difficulty using CPAP throughout the night although she is using it nearly every night.  She has a nasal mask which is problematic since her allergy symptoms are prominent at this time.  This is been an ongoing issue for her and allergy symptoms seem to be seasonal.  She is using over-the-counter medications for treatment.  She has never seen an allergy specialist in the past.    Compliance data below; this is consistent with her history and represents suboptimal adherence to PAP therapy.  I reviewed the severity of sleep disordered breathing with her and encouraged her to continue to eliminate obstacles to using CPAP throughout the night.    ResMed   Auto-PAP 6.0 - 9.0 cmH2O 30 day usage data:   10% of days with > 4 hours of use. 1/30 days with no use.   Average use 131 minutes per day.   95%ile Leak 5.32 L/min.   CPAP 95% pressure 8.9 cm.   AHI 18 events per hour.     Medications:     Current Outpatient Medications   Medication Sig     albuterol (ALBUTEROL) 108 (90 BASE) MCG/ACT inhaler Inhale 2 puffs into the lungs     Calcium Carbonate Antacid (TUMS LASTING EFFECTS PO) Take 2 tablets by mouth two times daily     carBAMazepine (TEGRETOL) 200 MG tablet Take 2 tablets (400 mg) by mouth 2 times daily     cholecalciferol (VITAMIN D-3 SUPER STRENGTH) 2000  UNITS tablet Take 2,000 Units by mouth     EPINEPHrine 0.15 MG/0.15ML SOAJ Inject 1 each into the muscle     lamoTRIgine (LAMICTAL) 25 MG tablet      levothyroxine (SYNTHROID, LEVOTHROID) 125 MCG tablet Take 2 tablets (250 mcg) by mouth every morning (before breakfast)     melatonin 3 MG tablet Take 6 mg by mouth     olopatadine (PATANOL) 0.1 % ophthalmic solution 1 drop     omeprazole (PRILOSEC) 20 MG DR capsule Take 20 mg by mouth     oxybutynin (DITROPAN-XL) 10 MG 24 hr tablet Take one tablet by mouth twice daily.     QUEtiapine (SEROQUEL) 200 MG tablet Take 200 mg by mouth     rosuvastatin (CRESTOR) 20 MG tablet Take 20 mg by mouth     Sennosides (SENNA) 8.6 MG CAPS      warfarin ANTICOAGULANT (COUMADIN) 10 MG tablet Take by mouth daily 10 mg Daily except on Tuesdays 12.5 mg     No current facility-administered medications for this visit.         Allergies   Allergen Reactions     Aspirin Shortness Of Breath     Bees Anaphylaxis     Latex Itching     Phenobarbital      Vistaril [Hydroxyzine] Other (See Comments)     Sluggish, unable to wake up     Gabapentin Rash       Past Medical History:     Past Medical History:   Diagnosis Date     Anxiety      Depressive disorder      Hypertension         Physical Examination:   There were no vitals taken for this visit.         This note has been dictated using voice recognition software. Any grammatical, typographical, or context distortions are unintentional and inherent to the software.    Kaley Baron MD   5/25/2021   [address], [place] [address]   [phone]    Copy to: Promise Vanegas

## 2021-05-25 ENCOUNTER — VIRTUAL VISIT (OUTPATIENT)
Dept: SLEEP MEDICINE | Facility: CLINIC | Age: 61
End: 2021-05-25
Payer: MEDICARE

## 2021-05-25 DIAGNOSIS — J30.2 SEASONAL ALLERGIC RHINITIS, UNSPECIFIED TRIGGER: ICD-10-CM

## 2021-05-25 DIAGNOSIS — G47.33 OSA (OBSTRUCTIVE SLEEP APNEA): Primary | ICD-10-CM

## 2021-05-25 PROCEDURE — 99442 PR PHYSICIAN TELEPHONE EVALUATION 11-20 MIN: CPT | Mod: 95 | Performed by: PEDIATRICS

## 2021-05-26 ASSESSMENT — PATIENT HEALTH QUESTIONNAIRE - PHQ9: SUM OF ALL RESPONSES TO PHQ QUESTIONS 1-9: 21

## 2021-05-26 NOTE — TELEPHONE ENCOUNTER
RN cannot approve Refill Request    RN can NOT refill this medication medication not on med list.       Last office visit: Visit date not found Last Physical: Visit date not found Last MTM visit: Visit date not found Last visit same specialty: 3/20/2019 Donald Rooney MD.  Next visit within 3 mo: Visit date not found  Next physical within 3 mo: Visit date not found      Jesse Christensen, [facility] Triage/Med Refill 3/23/2019    Requested Prescriptions   Pending Prescriptions Disp Refills     atorvastatin (LIPITOR) 20 MG tablet [Pharmacy Med Name: ATORVASTATIN 20MG TABLETS] 30 tablet 0     Sig: TAKE 1 TABLET BY MOUTH EVERY EVENING FOR CHOLESTEROL    Statins Refill Protocol (Hmg CoA Reductase Inhibitors) Passed - 3/23/2019 10:47 AM       Passed - PCP or prescribing provider visit in past 12 months     Last office visit with prescriber/PCP: Visit date not found OR same dept: 3/20/2019 Donald Rooney MD OR same specialty: 3/20/2019 Donald Rooney MD  Last physical: Visit date not found Last MTM visit: Visit date not found   Next visit within 3 mo: Visit date not found  Next physical within 3 mo: Visit date not found  Prescriber OR PCP: Wayne Orta MD  Last diagnosis associated with med order: There are no diagnoses linked to this encounter.  If protocol passes may refill for 12 months if within 3 months of last provider visit (or a total of 15 months).

## 2021-05-27 NOTE — TELEPHONE ENCOUNTER
Refill Approved    Rx renewed per Medication Renewal Policy. Medication was last renewed on 8/15/18. 9/26/18    Nadeen Stubbs, [facility] Triage/Med Refill 4/4/2019     Requested Prescriptions   Pending Prescriptions Disp Refills     oxybutynin (DITROPAN XL) 10 MG ER tablet [Pharmacy Med Name: OXYBUTYNIN ER 10MG TABLETS] 30 tablet 0     Sig: TAKE 1 TABLET BY MOUTH EVERY DAY    Urinary Incontinence Medications Refill Protocol Passed - 4/3/2019  9:27 AM       Passed - PCP or prescribing provider visit in past 12 months      Last office visit with prescriber/PCP: 3/20/2019 Donald Rooney MD OR same dept: 3/20/2019 Donald Rooney MD OR same specialty: 3/20/2019 Donald Rooney MD  Last physical: 9/13/2018 Last MTM visit: Visit date not found   Next visit within 3 mo: Visit date not found  Next physical within 3 mo: Visit date not found  Prescriber OR PCP: Donald Rooney MD  Last diagnosis associated with med order: 1. Vitamin D deficiency  - VITAMIN D3 2,000 unit capsule [Pharmacy Med Name: VITAMIN D3 2,000UNIT CAPSULES]; TAKE 1 CAPSULE BY MOUTH DAILY  Dispense: 30 capsule; Refill: 0    If protocol passes may refill for 12 months if within 3 months of last provider visit (or a total of 15 months).             VITAMIN D3 2,000 unit capsule [Pharmacy Med Name: VITAMIN D3 2,000UNIT CAPSULES] 30 capsule 0     Sig: TAKE 1 CAPSULE BY MOUTH DAILY    There is no refill protocol information for this order

## 2021-05-27 NOTE — TELEPHONE ENCOUNTER
RN cannot approve Refill Request    RN can NOT refill this medication historical medication requested. Last office visit: Visit date not found Last Physical: Visit date not found Last MTM visit: Visit date not found Last visit same specialty: 3/20/2019 Donald Rooney MD.  Next visit within 3 mo: Visit date not found  Next physical within 3 mo: Visit date not found      Shani Mariee, [facility] Triage/Med Refill 3/24/2019    Requested Prescriptions   Pending Prescriptions Disp Refills     atorvastatin (LIPITOR) 20 MG tablet [Pharmacy Med Name: ATORVASTATIN 20MG TABLETS] 30 tablet 0     Sig: TAKE 1 TABLET BY MOUTH EVERY EVENING FOR CHOLESTEROL    Statins Refill Protocol (Hmg CoA Reductase Inhibitors) Passed - 3/24/2019 10:04 AM       Passed - PCP or prescribing provider visit in past 12 months     Last office visit with prescriber/PCP: Visit date not found OR same dept: 3/20/2019 Donald Rooney MD OR same specialty: 3/20/2019 Donald Rooney MD  Last physical: Visit date not found Last MTM visit: Visit date not found   Next visit within 3 mo: Visit date not found  Next physical within 3 mo: Visit date not found  Prescriber OR PCP: Wayne Orta MD  Last diagnosis associated with med order: There are no diagnoses linked to this encounter.  If protocol passes may refill for 12 months if within 3 months of last provider visit (or a total of 15 months).

## 2021-05-27 NOTE — PROGRESS NOTES
Writer called the patient regarding the following message:    Sandra Cook 1 hour ago (2:07 PM)           Valentina, wife of Ayden called to say she just called 911 and they took Ayden away.  He has been having bizarre behavior lately (wanting to throw and break things, throwing cereal down the toilet) she did not know what to do.  When asked where they were taking him she said she did not know.           Documentation        Writer called Valentina  the patient's wife. She indicated that the patient had been refusing to take his medications and had been angry and throwing things on her. Patient decided to call 911. Valentina does not know which hospital the patient was taken but was planning to find out. She stated she felt some what stressed by Ayden's state but feels she is glad he is being taken care of. Valentina will call for help should she feel unsafe. She has the crisis number.

## 2021-05-27 NOTE — PROGRESS NOTES
Attempt 1-Care Guide called patient.  If this patient is returning our call please transfer to Dionne at ext. [phone]    Next outreach due: 4/17/19

## 2021-05-27 NOTE — TELEPHONE ENCOUNTER
ANTICOAGULATION  MANAGEMENT PROGRAM    Valentina Olmedo is overdue for INR check.  Reminder call made.    Spoke with Valentina and scheduled INR appointment on 4/9 .    Fanta Smith RN

## 2021-05-27 NOTE — TELEPHONE ENCOUNTER
Patient Returning Call  Reason for call:  Calling back  Information relayed to patient:  Offered to schedule INR but pt's  is in the hospital and she doesn't have any transportation to the clinic. Would like to wait until  is out of clinic to go in gor inr   Patient has additional questions:  No  If YES, what are your questions/concerns:    Okay to leave a detailed message?: Yes

## 2021-05-27 NOTE — TELEPHONE ENCOUNTER
Refill requests received for Trazodone and Duloxetine, and [facility] stated they sent those in error, as they already have scripts there from Dr. Puente.

## 2021-05-27 NOTE — TELEPHONE ENCOUNTER
Refill Approved    Rx renewed per Medication Renewal Policy. Medication was last renewed on 11/29/18.    Nadeen Stubbs, [facility] Triage/Med Refill 4/16/2019     Requested Prescriptions   Pending Prescriptions Disp Refills     omeprazole (PRILOSEC) 20 MG capsule [Pharmacy Med Name: OMEPRAZOLE 20MG CAPSULES] 30 capsule 0     Sig: TAKE 1 CAPSULE BY MOUTH DAILY BEFORE BREAKFAST       GI Medications Refill Protocol Passed - 4/15/2019  3:40 AM        Passed - PCP or prescribing provider visit in last 12 or next 3 months.     Last office visit with prescriber/PCP: 3/20/2019 Donald Rooney MD OR same dept: 3/20/2019 Donald Rooney MD OR same specialty: 3/20/2019 Donald Rooney MD  Last physical: 9/13/2018 Last MTM visit: Visit date not found   Next visit within 3 mo: Visit date not found  Next physical within 3 mo: Visit date not found  Prescriber OR PCP: Donald Rooney MD  Last diagnosis associated with med order: 1. Gastroesophageal reflux disease without esophagitis  - omeprazole (PRILOSEC) 20 MG capsule [Pharmacy Med Name: OMEPRAZOLE 20MG CAPSULES]; TAKE 1 CAPSULE BY MOUTH DAILY BEFORE BREAKFAST  Dispense: 30 capsule; Refill: 0    If protocol passes may refill for 12 months if within 3 months of last provider visit (or a total of 15 months).

## 2021-05-27 NOTE — TELEPHONE ENCOUNTER
ANTICOAGULATION  MANAGEMENT PROGRAM    Valentina Olmedo is overdue for INR check.  Reminder call made.    Left message for Valentina. If returning call, please schedule INR check as soon as possible.    Shonna Ernandez RN

## 2021-05-28 ASSESSMENT — ANXIETY QUESTIONNAIRES
GAD7 TOTAL SCORE: 13
GAD7 TOTAL SCORE: 13

## 2021-05-28 NOTE — TELEPHONE ENCOUNTER
Who is calling:  Xiao  Reason for Call:  Xiao was calling to confirm patients warfarin dosing.  Date of last appointment with primary care: n/a  Okay to leave a detailed message: Yes

## 2021-05-28 NOTE — TELEPHONE ENCOUNTER
Called ([phone])     - called 7x - still not available.     - Xiao originally called on 5/1/19.  Warfarin dose instructions was given to her.

## 2021-05-28 NOTE — TELEPHONE ENCOUNTER
ANTICOAGULATION  MANAGEMENT    Assessment     Today's INR result of 2.10 is Therapeutic (goal INR of 2.0-3.0)        Warfarin taken as previously instructed    No new diet changes affecting INR    No interaction expected between Topamax and warfarin    Continues to tolerate warfarin with no reported s/s of bleeding or thromboembolism     Previous INR was Therapeutic at 2.00 on 4/26/19.    Plan:     Spoke with Valentina regarding INR result and instructed:     Warfarin Dosing Instructions:    - Continue current warfarin dose 7.5 mg daily.    Instructed patient to follow up no later than:  4 wks.    Education provided: target INR goal and significance of current INR result, no interaction anticipated between warfarin and Topamax and importance of notifying clinic for changes in medications    Valentina verbalizes understanding and agrees to warfarin dosing plan.    Instructed to call the [facility] for any changes, questions or concerns. (#[phone])   ?   Shonna Ernandez RN    Subjective/Objective:      Valentina Olmedo, a 59 y.o. female is on warfarin.     Valentina reports:     Home warfarin dose: as updated on anticoagulation calendar per template     Missed doses: No     Medication changes:  Yes:  One 5/7/19 new RX Topamax 25mg one tablet at bed-time, to suppress her eating, to help her loose WT.     S/S of bleeding or thromboembolism:  No     New Injury or illness:  No     Changes in diet or alcohol consumption:  Yes.  Likes to eat.     Upcoming surgery, procedure or cardioversion:  No    Anticoagulation Episode Summary     Current INR goal:   2.0-3.0   TTR:   62.0 % (1.8 y)   Next INR check:   6/10/2019   INR from last check:   2.10 (5/13/2019)   Weekly max warfarin dose:      Target end date:      INR check location:      Preferred lab:      Send INR reminders to:   ANTICOAGULATION POOL C (DTN,VAD,CGR,GAV)    Indications    Pulmonary embolism with infarction (H) [I26.99]           Comments:            Anticoagulation Care  Providers     Provider Role Specialty Phone number    Donald Rooney MD Referring Internal Medicine [phone]

## 2021-05-28 NOTE — PROGRESS NOTES
This Graduation Wellness Plan provides private information in regards to the work I have done with my Care Team from my Primary Care Clinic.  This document provides insight on the goals I have accomplished.  My Care Team congratulates me on my journey to maintain wellness.  This document will help guide me on my journey to maintain a healthy lifestyle.  I will use this to help me overcome any barriers I may encounter.  If I should have any questions or concerns, I will continue to contact the members of my Care Team or contact my Primary Care Clinic for assistance.        My Clinic Care Coordination Wellness Plan    [facility] [address]  [address]  [address] [place]  [address]  [phone]    My Preferred Method of Contact:  Phone: [phone]    My Primary/Preferred Language:  English    Preferred Learning Style:  Reading information, Face to face discussion, Pictures/Diagrams and Hands on teaching    Emergency Contact: Extended Emergency Contact Information  Primary Emergency Contact: Juan Ramonjuan diegoJass  Address: [address] [place] [address] [id number] [place] [address]  Home Phone: [phone]  Relation: Spouse  Secondary Emergency Contact: Nadeen Morgan   [place]  Mobile Phone: [phone]  Relation: Sister-In-Law     PCP:  Donald Rooney MD  Specialists:    Care Team            Donald Rooney MD   [phone] PCP - General, Internal Medicine    Dionne Olivas, [facility] Care Coordination Care Guide, Primary Care - [facility] [phone] Fax [phone]     Loretta Barney, [facility]   [phone] , Licensed Clinical     Breana Puente MD   [phone] Physician, Psychiatry    Patty Ellis   [phone]       - [facility]        Accomplishments:  Goals        General      COMPLETED: I am able to get groceries   (pt-stated)      By working with my county  I am able to afford groceries or get to the food shelf when we are low on food.                  Advanced Directive/Living Will: The patient was given information regarding Adanced Directives/Living Will    Clinical Emergency Plan    Epilepsy: Safety during a Seizure       Let family and friends know what to expect and how to react when you have a seizure. This helps keep them calm and you safe. All seizures should be treated with care, but tonic-clonic seizures (seizures during which you lose consciousness) require more attention. Here are some pointers for loved ones.  What to Know     Seizures typically last less than 3 minutes, but it will feel like it is longer. People recover safely from most seizures. During a tonic-clonic seizure, the person may appear to stop breathing or turn slightly blue. This may be scary for you, but try to stay calm. Afterward, the person may be tired, confused, and achy. He or she may need to sleep for several hours to fully recover.  What to Do     During any seizure, stay with the person until it is over. Note the time when the seizure starts and ends. Don t try to stop the seizure. During a tonic-clonic seizure, also do the following:    Move hard or sharp objects out of the way.    Lay the person on a flat surface and turn them on their side.    Place a flat, soft object under their head.    Don t try to restrain the person. Both of you could get hurt.    Don t put anything in the person s mouth. They cannot swallow their tongue, and you risk breaking their teeth or being bitten.    Don t give the person medications during a seizure, unless you ve been trained by a health care provider.    Speak quietly to the person as they recover.     Call 911 if the seizure lasts longer than 5 minutes, there is no conscious interval between 2 seizures, or several seizures occur in a row. These events could represent status epilepticus, a  medical emergency. Also call 911 if the seizure is very different from past seizures, a first time seizure, the patient does not 'wake up' or regain consciousness, or if the person is pregnant or has diabetes.     Preventing Falls in the Home  An adult or child can fall for many reasons. If you are an older adult, you may fall because your reaction time slows down. Your muscles and joints may get stiff, weak, or less flexible because of illness, medicines, or a physical condition. These things can also make a child more likely to fall or be injured in a fall.  Other health problems that make falls more likely include:    Arthritis    Dizziness or lightheadedness when you get out of bed (orthostatic hypotension)    History of a stroke    Dizziness    Anemia    Certain medicines taken for mental illness    Problems with balance or gait    History of falls with or without an injury    Changes in vision (vision impairment)    Changes in thinking skills and memory (cognitive impairment)  Injuries from a fall can include broken bones, dislocated joints, and cuts. When these injuries are serious enough, they can make it impossible for you or a child who is injured in a fall to live on his or her own.  Prevention tips  To help prevent falls and fall-related injuries, follow the tips below.   Floors  Make floors safer by doing the following:     Put nonskid pads under area rugs.    Remove throw rugs.    Replace worn floor coverings.    Tack carpets firmly to each step on carpeted stairs. Put nonskid strips on the edges of uncarpeted stairs.    Keep floors and stairs free of clutter and cords.    Arrange furniture so there are clear pathways.    Clean up any spills right away.    Wear shoes that fit.  Bathrooms  Make bathrooms safer by doing the following:     Install grab bars in the tub or shower.    Apply nonskid strips or put a nonskid rubber mat in the tub or shower.    Sit on a bath chair to bathe.    Use bathmats with  nonskid backing.  Lighting and the environment  Improve lighting in your home by doing the following:     Keep a flashlight in each room. Or put a lamp next to the bed within easy reach.    Put nightlights in the bedrooms, hallways, kitchen, and bathrooms.    Make sure all stairways have good lighting.    Take your time when going up and down stairs.    Put handrails on both sides of stairs and in walkways for more support. To prevent injury to your wrist or arm, don t use handrails to pull yourself up.    Install grab bars to pull yourself up.    Move or rearrange items that you use often. This will make them easier to find or reach.    Look at your home to find any safety hazards. Especially look at doorways, walkways, and the driveway. Remove or repair any safety problems that you find.  Preventing Falls    Having a health problem can make you more likely to fall. Taking certain kinds of medicines may also increase your risk of falls. So, improving your health can help you avoid a fall. Work with your healthcare provider to manage health problems and to review your medicines. If you have your health under control, your risk of falling is lessened.     When to call your healthcare provider  Be sure to call your healthcare provider if you fall. Also call if you have any of these signs or symptoms (someone else may need to point them out to you):    Feeling lightheaded or dizzy more than once a day    Losing your balance often or feeling unsteady on your feet    Feeling numbness in your legs or feet, or noticing a change in the way you walk    Having a steady decline in your memory or mental sharpness       [id number] [employer]. [address] [place] [address], [place] [address]. All rights reserved. This information is not intended as a substitute for professional medical care. Always follow your healthcare professional's instructions.      Emergency Plan Recommendation:     When to Use the Emergency  Department (ED)  An emergency means you could die if you don t get care quickly. Or you could be hurt permanently (disabled). Read below to know when to use -- and when not to use -- an emergency department (also called ED).     Dangers to your life  Here are examples of emergencies. These need immediate care:  A hard time breathing  Severe chest pain  Choking  Severe bleeding  Suddenly not able to move or speak  Blacking out (fainting)`  Poisoning     Dangers of permanent injuries  Here are other emergencies. These also need immediate care:  Deep cuts or severe burns  Broken bones, or sudden severe pain and swelling in a joint     When it s an emergency  If you have an emergency, follow these steps:     1. Go to the nearest emergency department  If you can, go to the hospital ED closest to you right away.  If you cannot get there right away, or if it is not safe to take yourself, call 911 or your police emergency number.  2. Call your primary care doctor  Tell your doctor about the emergency. Call within 24 hours of going to the ED.  If you cannot call, have someone call for you.  Go to your doctor (not the ED) for any follow-up care.     When it s not an emergency  If a problem is not an emergency, follow these steps:     1. Call your primary care doctor  If you don t know the name of your doctor, call your health plan.  If you cannot call, have someone call for you.  2. Follow instructions  Your doctor will tell you what you should do.  You may be told to see your doctor right away. You may be told to go to the ED. Or you may be told to go to an urgent care center.  Follow your doctor s advice.      All [facility] clinic patients have access to a Nurse 24 hours a day, 7 days a week.  If you have questions or want advice from a Nurse, please know [facility] is here for you.  You can call your clinic and they will connect you or you can call [facility] at [phone].  [facility] also has Walk In Care clinics  in multiple locations.  Call the number listed above for more information about our Walk In Care clinics or visit the [employer] website at [email or web address].

## 2021-05-28 NOTE — TELEPHONE ENCOUNTER
FYI - Status Update  Who is Calling: Home Care nurse Xiao  Update: Patient reported to Xiao today that she has been falling to the ground and not been able to get up. She also reported that she is having more difficulty transferring from a seated position to standing. Patient has always been a high fall risk but Xiao is now concerned about patient's ability to stay safe in her home. Xiao would like to make PCP aware of these changes and she is wondering if a referral to OT or something along those lines is necessary.    Okay to leave a detailed message?:  Yes please call so Xiao can assist with coordinating care, and providing education to patient.
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
## 2021-05-28 NOTE — PROGRESS NOTES
Epilepsy: Safety during a Seizure      Let family and friends know what to expect and how to react when you have a seizure. This helps keep them calm and you safe. All seizures should be treated with care, but tonic-clonic seizures (seizures during which you lose consciousness) require more attention. Here are some pointers for loved ones.  What to Know    Seizures typically last less than 3 minutes, but it will feel like it is longer. People recover safely from most seizures. During a tonic-clonic seizure, the person may appear to stop breathing or turn slightly blue. This may be scary for you, but try to stay calm. Afterward, the person may be tired, confused, and achy. He or she may need to sleep for several hours to fully recover.  What to Do    During any seizure, stay with the person until it is over. Note the time when the seizure starts and ends. Don t try to stop the seizure. During a tonic-clonic seizure, also do the following:    Move hard or sharp objects out of the way.    Lay the person on a flat surface and turn them on their side.    Place a flat, soft object under their head.    Don t try to restrain the person. Both of you could get hurt.    Don t put anything in the person s mouth. They cannot swallow their tongue, and you risk breaking their teeth or being bitten.    Don t give the person medications during a seizure, unless you ve been trained by a health care provider.    Speak quietly to the person as they recover.    Call 911 if the seizure lasts longer than 5 minutes, there is no conscious interval between 2 seizures, or several seizures occur in a row. These events could represent status epilepticus, a medical emergency. Also call 911 if the seizure is very different from past seizures, a first time seizure, the patient does not 'wake up' or regain consciousness, or if the person is pregnant or has diabetes.    Preventing Falls in the Home  An adult or child can fall for many reasons. If  you are an older adult, you may fall because your reaction time slows down. Your muscles and joints may get stiff, weak, or less flexible because of illness, medicines, or a physical condition. These things can also make a child more likely to fall or be injured in a fall.  Other health problems that make falls more likely include:  Arthritis  Dizziness or lightheadedness when you get out of bed (orthostatic hypotension)  History of a stroke  Dizziness  Anemia  Certain medicines taken for mental illness  Problems with balance or gait  History of falls with or without an injury  Changes in vision (vision impairment)  Changes in thinking skills and memory (cognitive impairment)  Injuries from a fall can include broken bones, dislocated joints, and cuts. When these injuries are serious enough, they can make it impossible for you or a child who is injured in a fall to live on his or her own.  Prevention tips  To help prevent falls and fall-related injuries, follow the tips below.   Floors  Make floors safer by doing the following:   Put nonskid pads under area rugs.  Remove throw rugs.  Replace worn floor coverings.  Tack carpets firmly to each step on carpeted stairs. Put nonskid strips on the edges of uncarpeted stairs.  Keep floors and stairs free of clutter and cords.  Arrange furniture so there are clear pathways.  Clean up any spills right away.  Wear shoes that fit.  Bathrooms  Make bathrooms safer by doing the following:   Install grab bars in the tub or shower.  Apply nonskid strips or put a nonskid rubber mat in the tub or shower.  Sit on a bath chair to bathe.  Use bathmats with nonskid backing.  Lighting and the environment  Improve lighting in your home by doing the following:   Keep a flashlight in each room. Or put a lamp next to the bed within easy reach.  Put nightlights in the bedrooms, hallways, kitchen, and bathrooms.  Make sure all stairways have good lighting.  Take your time when going up and down  stairs.  Put handrails on both sides of stairs and in walkways for more support. To prevent injury to your wrist or arm, don t use handrails to pull yourself up.  Install grab bars to pull yourself up.  Move or rearrange items that you use often. This will make them easier to find or reach.  Look at your home to find any safety hazards. Especially look at doorways, walkways, and the driveway. Remove or repair any safety problems that you find.  Preventing Falls    Having a health problem can make you more likely to fall. Taking certain kinds of medicines may also increase your risk of falls. So, improving your health can help you avoid a fall. Work with your healthcare provider to manage health problems and to review your medicines. If you have your health under control, your risk of falling is lessened.    When to call your healthcare provider  Be sure to call your healthcare provider if you fall. Also call if you have any of these signs or symptoms (someone else may need to point them out to you):    Feeling lightheaded or dizzy more than once a day    Losing your balance often or feeling unsteady on your feet    Feeling numbness in your legs or feet, or noticing a change in the way you walk    Having a steady decline in your memory or mental sharpness      [id number] [employer]. [address] [place] [address], [place] [address]. All rights reserved. This information is not intended as a substitute for professional medical care. Always follow your healthcare professional's instructions.     Emergency Plan Recommendation:    When to Use the Emergency Department (ED)  An emergency means you could die if you don t get care quickly. Or you could be hurt permanently (disabled). Read below to know when to use -- and when not to use -- an emergency department (also called ED).    Dangers to your life  Here are examples of emergencies. These need immediate care:  A hard time breathing  Severe chest  pain  Choking  Severe bleeding  Suddenly not able to move or speak  Blacking out (fainting)`  Poisoning    Dangers of permanent injuries  Here are other emergencies. These also need immediate care:  Deep cuts or severe burns  Broken bones, or sudden severe pain and swelling in a joint    When it s an emergency  If you have an emergency, follow these steps:    1. Go to the nearest emergency department  If you can, go to the hospital ED closest to you right away.  If you cannot get there right away, or if it is not safe to take yourself, call 911 or your police emergency number.  2. Call your primary care doctor  Tell your doctor about the emergency. Call within 24 hours of going to the ED.  If you cannot call, have someone call for you.  Go to your doctor (not the ED) for any follow-up care.    When it s not an emergency  If a problem is not an emergency, follow these steps:    1. Call your primary care doctor  If you don t know the name of your doctor, call your health plan.  If you cannot call, have someone call for you.  2. Follow instructions  Your doctor will tell you what you should do.  You may be told to see your doctor right away. You may be told to go to the ED. Or you may be told to go to an urgent care center.  Follow your doctor s advice.

## 2021-05-28 NOTE — TELEPHONE ENCOUNTER
Who is calling:  Xiao home care nurse with [facility]  Reason for Call:  Nurse was calling to confirm patient's warfarin dosing for this week.    Writer did mention that [facility] had spoken with her last week, which nurse did not remember.    Date of last appointment with primary care: 03/20/19  Okay to leave a detailed message: Yes

## 2021-05-28 NOTE — PROGRESS NOTES
"Date of Service:  2019    Name:  Valentina Olmedo  :  1960  MRN:  [id number]    HPI:   Valentina Olmedo 59 y.o. female with neurocognitive deficits, borderline personality disorder, major depressive disorder, seizure disorder, CHRIS, hypothyroid, on warfarin for PE. She is accompanied by Jose F Milan CM, \"Patty\".   Last seen in Feb. At that visit, I encouraged her to complete sleep clinic and PCP appointment (did not do).    Fractured right foot in March.  Fell outside [facility].  Wore boot (no surgery).  Her  Al was hospitalized for esophageal bleeding.  He has home nursing now.    Insomnia, snoring, gasping. Watches TV in bedroom.  Turns TV off at 10:30PM, but does not fall asleep.  Reports lying in bed awake all night.  Cannot go to couch in living room because  is there.    ROS: Insomnia, bilateral feet numbness, weight gain 6 pounds since February.    Quit smoking last month.    Therapy-individual therapy with Loretta.     Illness Recovery and Management group with [facility] (on hold for now).      Neurocognitive deficits  Neuropsych testing done (3/20/18).  Note excerpt:  \"Implications/recommendations:  Her current test performance demonstrates significant decline relative to last evaluation in .    The possibility of significant medication side effects secondary to her anti-epileptic and psychotropic drug regime should be explored, as well possible onset of dementia.   Follow-up by neuropsychology should be considered if a medication adjustment results in some improvement in her presentation.\"   --------Excerpt by Irais De La Cruz PhD at [facility],  (3/20/18)---------------------    Social  Lives with  in [facility] in [place].  Has 3 cats-Yulisa Camarillo Parker.    Harry Ellis [phone]  Weekly Med set- up.    Medical  Seizure disorder, Neurologist Praveena Benitez, [facility]    CHRIS, Dx'd at [facility] by Benjie Allan MD 12/10/2013, \"AutoCPAP 5-20 cms " "of water is recommended, while patient sleeps\".  Sleep study done at [facility] 4/2015- \"Pt did not have any significant sleep apnea during her test\", 4/6/15 Fernando Montaño MD     Hypothyroid    On warfarin for P.E, April 2017       Medications:       Current Outpatient Medications on File Prior to Visit   Medication Sig Dispense Refill     albuterol (PROAIR HFA;PROVENTIL HFA;VENTOLIN HFA) 90 mcg/actuation inhaler Inhale 2 puffs every 4 (four) hours as needed for wheezing. 1 Inhaler 6     benztropine (COGENTIN) 1 MG tablet Take 1 tablet (1 mg total) by mouth daily. 30 tablet 6     carBAMazepine (TEGRETOL) 200 mg tablet Take 200 mg by mouth 3 (three) times a day. 2 tabs (400mg) q AM, 1.5 tabs (300mg) q PM and HS       diphenhydrAMINE HCl 25 mg TbDL Take 25 mg by mouth at bedtime. 30 each 6     DULoxetine (CYMBALTA) 60 MG capsule Take 2 capsules (120 mg total) by mouth daily. 60 capsule 6     ethotoin (PEGANONE) 250 mg Tab Take 1,000 mg by mouth 3 (three) times a day.       levothyroxine (SYNTHROID) 200 MCG tablet Take 1 tablet (200 mcg total) by mouth daily. 30 tablet 11     melatonin 3 mg Tab tablet Take 1 tablet (3 mg total) by mouth at bedtime as needed. 30 tablet 2     nicotine polacrilex (NICORETTE) 2 mg gum Apply 1 each (2 mg total) to the mouth or throat as needed for smoking cessation. 220 each 2     omeprazole (PRILOSEC) 20 MG capsule TAKE 1 CAPSULE BY MOUTH DAILY BEFORE BREAKFAST 90 capsule 3     oxybutynin (DITROPAN XL) 10 MG ER tablet TAKE 1 TABLET BY MOUTH EVERY DAY 30 tablet 11     QUEtiapine (SEROQUEL) 200 MG tablet Take 1 tablet (200 mg total) by mouth at bedtime (generic. Seroquel) 30 tablet 6     risperiDONE (RISPERDAL) 0.5 MG tablet Take 1 tablet (0.5 mg total) by mouth at bedtime. 30 tablet 2     senna (SENOKOT) 8.6 mg tablet Take 2 tablets by mouth 2 (two) times a day. 120 tablet 0     traZODone (DESYREL) 100 MG tablet Take 2 tablets (200 mg total) by mouth at bedtime. 60 tablet 6     VITAMIN D3 2,000 " unit capsule TAKE 1 CAPSULE BY MOUTH DAILY 30 capsule 11     warfarin (COUMADIN) 5 MG tablet Take 2 tablet by mouth two days per week and take 1 and 1/2 tablet by mouth the other 5 days of the week 50 tablet 19     atorvastatin (LIPITOR) 20 MG tablet TAKE 1 TABLET BY MOUTH EVERY EVENING FOR CHOLESTEROL 30 tablet 0     EPINEPHrine 0.15 mg/0.15 mL atIn Inject 1 each into the shoulder, thigh, or buttocks.       polyethylene glycol (MIRALAX) 17 gram packet Take 17 g by mouth daily as needed.        rosuvastatin (CRESTOR) 20 MG tablet Take 1 tablet (20 mg total) by mouth daily. 90 tablet 3     [DISCONTINUED] atorvastatin (LIPITOR) 20 MG tablet TAKE 1 TABLET BY MOUTH EVERY EVENING FOR CHOLESTEROL 30 tablet 0     No current facility-administered medications on file prior to visit.           Associated Clinical Documents:       Notes reviewed in EPIC and [facility] including: medication reconciliation, nurses's notes, progress notes, recent labs, PMH, and OSH records.    ROS:       10 point ROS was negative except for the items listed in HPI.      MSE:      Vital signs: refer to nursing notes from today.   Alert & oriented x 3.  Smiles, good eye contact  Appearance: Appears stated age, casually dressed.  Speech: Normal rate, rhythm and tone.  Gait: Normal.  Musculoskeletal:  no abnormal movements.  Mood/Affect: brighter  Thought Process: circumferential  Thought Content: Occ passive SI, no plan. No homicide ideation.  Associations: Intact, no delusions.  Perceptions: See HPI  Memory: recent and remote memory intact, not formally tested  Attention span and concentration: Fair  Language: Intact.  Fund of Knowledge: Normal.  Insight and Judgement: Adequate    Impression:        1. Neurocognitive deficits-more alert decreasing SGA.  2. major depressive disorder, recurrent, currently in remission.   3. CHRIS- does not want CPAP.  4.  Obesity-worsening wt gain since quitting cigs.      Plan:       1.  Teaching done on risks of untreated  sleep apnea.  Referral to    sleep clinic.  2.  Pt to make appt with Dr. Rooney- discuss bariatric and dietitian referral.  3. Topamax 25 mg nightly for appetite.  Micromedix drug-drug interaction checked.  4.  Continue other meds as above, f/u 2 mos.  5.  Continue therapy        Total Time and Coordination of Care:       25 Minutes spent in the care of this patient with >50% of this time was spent in   Face-to-face counseling specifically discussing and educating about sleep apnea, diet, and bariatric clinic. Teaching done on the risks, benefits and side effects of medications.      This dictation was completed with speech recognition software and there may be unintended word substitutions.

## 2021-05-28 NOTE — TELEPHONE ENCOUNTER
Called and spoke with Xiao (Home Care nurse for Valentina Olmedo).     - last INR was therapeutic on 4/26/19 at 2.00     - current warfarin dose is 7.5mg daily.

## 2021-05-28 NOTE — PROGRESS NOTES
"Mental Health Visit Note    5/9/2019    Start time: 1:10pm    Stop Time: 1:52pm  Session # 3    Session Type: Patient is presenting for an Individual session.   Persons present include patient and therapist, patient's spouse is present for collateral in session and per patient request, and because he providers transport.    Valentina Olmedo is a 59 y.o. female is being seen today for   Chief Complaint   Patient presents with     MH Follow Up     Anxiety     anxiety about spouse's difficulties     Depression     low mood with poor sleep, low mood, worries about spouse   .     New symptoms or complaints: Poor sleep.     Functional Impairment:   Personal: 3  Family: 2-3 (no contact with family apart from spouse)  Work: 4  Social: 3-4  (no social support apart from )    Clinical assessment of mental status: Patient's grooming is Well groomed, his attire is Appropriate, and his age appears Appears Stated. Behavior towards examiner is Cooperative, motor activity is Within normal, and eye contact is Staring.  Patient's mood is Depressed, and affect is Blunted, Flat and Depressed.  Speech/language is Delayed/Hesitant, attention is Distractible, and concentration is Brief. Thought process is Slow, thought content is Within noraml and  Within normal.  Patient's orientation is X 3, memory is  Impairment and Recent, judgement is Impairment and Minimal, and estimated intelligence is Below Average. Demonstrated insight is Adequate while fund of knowledge is adequate.    Suicidal/Homicidal Ideation present:  Patient reports passive SI noted last session \"mostly gone (resolved)\".   She denies active plan or intent.  States she would not do it because of her .  Agrees to call 911 or present to ER if she has impulses to harm herself.    Patient's impression of their current status:   Patient reports she is feeling \"a little depressed and anxious\".  Denies passive SI noted last time, admits she's not sure if she's had any " "since last session. Sleep is poor.  She met with psychiatry about same.    Patient reports mixed follow-up with therapy homework: She is working with both her own and her 's  but has not identified increased support options in the community.  Practicing deep breathing and grounding in the senses \"once or twice\" for acute anxiety.  She continues efforts to get up and dressed and out of the bedroom during the daytime but admits that sometimes she just does not feel like moving.  She has been able to attempt very little walking because of recovery from a broken foot (arrives in a wheelchair today).    Therapist impression of patients current state: Patient shows some improvement from depressed and anxious symptomology exhibited last session but continues with some chronic symptoms.  Suicidal ideation has apparently resolved.  Social support system remains poor and she would benefit from increased support via therapy group or IOP.  She is nonetheless maintaining herself in the community without crisis intervention or mental health emergency which is progress given this patient's chronicity and history of civil commitment.    Processed negative cognitions, reinforced strengths, validated patient efforts and feelings.  Normalized worries in the context of her 's difficulties and validated patient's efforts to be there and support him.   Discussed benefit of increased social connection. Discussed that Mental Illness and Recovery is starting again. Provided referral for registration.  Discussed that IOP or a day program might also be appropriate for increased support, patient declines at this point.    Type of psychotherapeutic technique provided: Client centered, Solution-focused and CBT      Progress toward short term goals: Mixed progress: She is working with both her own and her 's  but has not identified increased support options in the community.  Practicing deep breathing " "and grounding in the senses \"once or twice\" for acute anxiety.  She continues efforts to get up and dressed and out of the bedroom during the daytime but admits that sometimes she just does not feel like moving.  She has been able to attempt very little walking because of recovery from a broken foot (arrives in a wheelchair today).    Review of long term goals: Not done at today's visit and Date of last review 4/25/2019    Diagnosis:   1. Generalized anxiety disorder    2. Depression, major, recurrent, moderate (H)    3. Chronic post-traumatic stress disorder (PTSD)    4. Borderline personality disorder (H)    5. Cognitive deficits        Plan and Follow up: Patient will return for follow up in four weeks.  Patient will work with her own and her 's  to identify increased support options for each of them in the community.  She will register for and attend mental health illness and recovery goalsetting group.  She will utilize deep breathing and grounding her senses as practice and session for acute anxiety.  She will continue efforts to get up and dressed and out of the bedroom during the daytime.  She will travel with her  to make excursions into the community at least 2 times per week.    Discharge Criteria/Planning: Client has chronic symptoms and ongoing therapy for maintenance stability recommended.    Loretta Barney MSW, LICSW  5/9/2019  "

## 2021-05-28 NOTE — PROGRESS NOTES
RN chart review requested.  Patient has multiple resources in the community and CCC is no longer needed.    Will graduate patient after RN chart review complete

## 2021-05-28 NOTE — TELEPHONE ENCOUNTER
ANTICOAGULATION MANAGEMENT PROGRAM--Non-Compliance Chart Review    Valentina Olmedo is overdue for INR follow up.      INR Results:   Lab Results   Component Value Date    INR 1.84 (H) 03/11/2019    INR 2.80 (H) 02/06/2019    INR 2.80 (H) 01/08/2019         Chart reviewed, continue non-compliance protocol       Fanta Smith RN

## 2021-05-28 NOTE — TELEPHONE ENCOUNTER
Received request for updated Home Health Care Certification and Plan of Treatment from Xiao Dias RN from [facility]    Please review and sign for orders if approved.  Fax back to Atten: Xiao Dias RN fax number: [phone] (must dial 1 before number)      Provider reviewed and signed faxing back to [facility] 4/24/2019    Labeled and copy held in temp holding area original sent to scanning.

## 2021-05-28 NOTE — TELEPHONE ENCOUNTER
Date of Last Office Visit: 5/7/19  Date of Next Office Visit: 7/9/19  No shows since last visit: none  Cancellations since last visit: none  ED visits since last visit:  none  Medication Risperdal 0.5 mg date last ordered: 2/1/19  Qty: 30  Refills: 2  Lapse in therapy greater than 7 days: no  Medication refill request verified as identical to current order: yes  Result of Last DAM, VPA, Li+ Level, CBC, or Carbamazepine Level (at or since last visit): N/A     [x] Medication refilled per [facility] M-1.   [] Medication unable to be refilled by RN due to criteria not met as indicated below:     []Eligibility - not seen in last year    []Supervision - no future appointment    []Compliance     []Verification - order discrepancy    []Controlled Medication    []Medication not included in RN Protocol    []90 - day supply request    []Other     Current Medication list:    albuterol (PROAIR HFA;PROVENTIL HFA;VENTOLIN HFA) 90 mcg/actuation inhaler Inhale 2 puffs every 4 (four) hours as needed for wheezing.   atorvastatin (LIPITOR) 20 MG tablet TAKE 1 TABLET BY MOUTH EVERY EVENING FOR CHOLESTEROL   benztropine (COGENTIN) 1 MG tablet Take 1 tablet (1 mg total) by mouth daily.   carBAMazepine (TEGRETOL) 200 mg tablet Take 200 mg by mouth 3 (three) times a day. 2 tabs (400mg) q AM, 1.5 tabs (300mg) q PM and HS   diphenhydrAMINE HCl 25 mg TbDL Take 25 mg by mouth at bedtime.   DULoxetine (CYMBALTA) 60 MG capsule Take 2 capsules (120 mg total) by mouth daily.   EPINEPHrine 0.15 mg/0.15 mL atIn Inject 1 each into the shoulder, thigh, or buttocks.   ethotoin (PEGANONE) 250 mg Tab Take 1,000 mg by mouth 3 (three) times a day.   levothyroxine (SYNTHROID) 200 MCG tablet Take 1 tablet (200 mcg total) by mouth daily.   melatonin 3 mg Tab tablet Take 1 tablet (3 mg total) by mouth at bedtime as needed.   nicotine polacrilex (NICORETTE) 2 mg gum Apply 1 each (2 mg total) to the mouth or throat as needed for smoking cessation.   omeprazole  (PRILOSEC) 20 MG capsule TAKE 1 CAPSULE BY MOUTH DAILY BEFORE BREAKFAST   oxybutynin (DITROPAN XL) 10 MG ER tablet TAKE 1 TABLET BY MOUTH EVERY DAY   polyethylene glycol (MIRALAX) 17 gram packet Take 17 g by mouth daily as needed.    QUEtiapine (SEROQUEL) 200 MG tablet Take 1 tablet (200 mg total) by mouth at bedtime (generic. Seroquel)   risperiDONE (RISPERDAL) 0.5 MG tablet Take 1 tablet (0.5 mg total) by mouth at bedtime.   rosuvastatin (CRESTOR) 20 MG tablet Take 1 tablet (20 mg total) by mouth daily.   senna (SENOKOT) 8.6 mg tablet Take 2 tablets by mouth 2 (two) times a day.   topiramate (TOPAMAX) 25 MG tablet Take 1 tablet (25 mg total) by mouth at bedtime.   traZODone (DESYREL) 100 MG tablet Take 2 tablets (200 mg total) by mouth at bedtime.   VITAMIN D3 2,000 unit capsule TAKE 1 CAPSULE BY MOUTH DAILY   warfarin (COUMADIN) 5 MG tablet Take 2 tablet by mouth two days per week and take 1 and 1/2 tablet by mouth the other 5 days of the week       Medication Plan of Care at last office visit with MD/CNP:  Plan:       1.  Teaching done on risks of untreated sleep apnea.  Referral to    sleep clinic.  2.  Pt to make appt with Dr. Rooney- discuss bariatric and dietitian referral.  3. Topamax 25 mg nightly for appetite.  Micromedix drug-drug interaction checked.  4.  Continue other meds as above, f/u 2 mos.  5.  Continue therapy

## 2021-05-28 NOTE — TELEPHONE ENCOUNTER
ANTICOAGULATION  MANAGEMENT    Assessment     Today's INR result of 2.00 is Therapeutic (goal INR of 2.0-3.0)        Warfarin taken as previously instructed    No new diet changes affecting INR    No new medication/supplements affecting INR    Continues to tolerate warfarin with no reported s/s of bleeding or thromboembolism     Previous INR was Subtherapeutic at 1.84 on 3/11/19.    Plan:     Spoke with Valentina regarding INR result and instructed:     Warfarin Dosing Instructions:   (has 5mg tabs)   - Continue current warfarin dose 7.5 mg daily    Instructed patient to follow up no later than:  2 wks.    Education provided: importance of consistent vitamin K intake, target INR goal and significance of current INR result and importance of notifying clinic for diarrhea, nausea/vomiting, reduced intake and/or illness    Valentina verbalizes understanding and agrees to warfarin dosing plan.    Instructed to call the [facility] for any changes, questions or concerns. (#[phone])   ?   Shonna Ernandez RN    Subjective/Objective:      Valentina ORNELAS Juan Ramonjuan diego, a 59 y.o. female is on warfarin.     Valentina reports:     Home warfarin dose: as updated on anticoagulation calendar per template     Missed doses: No     Medication changes:  No     S/S of bleeding or thromboembolism:  No     New Injury or illness:  No.  But reported feeling depressed.  She has been so stressed out due to her , Jass's recent hospitalization.   - had a fall (slipped on ice) on 3/11/19, incurred closed non-displaced fracture of 5th metatarsal bone of right foot.  Wearing CAM boot.     Changes in diet or alcohol consumption:  No     Upcoming surgery, procedure or cardioversion:  No    Anticoagulation Episode Summary     Current INR goal:   2.0-3.0   TTR:   61.0 % (1.7 y)   Next INR check:   5/10/2019   INR from last check:   2.00 (4/26/2019)   Weekly max warfarin dose:      Target end date:      INR check location:      Preferred lab:      Send INR reminders  to:   ANTICOAGULATION POOL C (DTN,VAD,CGR,GAV)    Indications    Pulmonary embolism with infarction (H) [I26.99]           Comments:            Anticoagulation Care Providers     Provider Role Specialty Phone number    Donald Rooney MD Referring Internal Medicine [phone]

## 2021-05-29 ENCOUNTER — RECORDS - HEALTHEAST (OUTPATIENT)
Dept: ADMINISTRATIVE | Facility: CLINIC | Age: 61
End: 2021-05-29

## 2021-05-29 NOTE — TELEPHONE ENCOUNTER
Medication Question or Clarification  Who is calling: Pharmacy: [facility]  What medication are you calling about? (include dose and sig) atorvastatin (LIPITOR) 20 MG tablet and rosuvastatin (CRESTOR) 20 MG tablet  Who prescribed the medication?: Dr. Rooney  What is your question/concern?: Pharmacy is looking for clarification on dosing of the medications  Pharmacy: [facility]  Okay to leave a detailed message?: Yes  Site CMT - Please call the pharmacy to obtain any additional needed information.

## 2021-05-29 NOTE — TELEPHONE ENCOUNTER
RECEIVED REQUEST FROM [facility] FOR UPDATED STANDARDIZED ASSESSMENT - PUT IN PROVIDER'S CLINIC MAILBOX

## 2021-05-29 NOTE — TELEPHONE ENCOUNTER
Who is calling: [facility] -   Reason for Call: Below message incorrect-  Please clarify wich  STATIN medication patient is to be taking ,  Lipitor OR  Crestor - Please call pharmacy and advise ASAP   Date of last appointment with primary care:    Has the patient been recently seen:  Yes  Okay to leave a detailed message: Yes

## 2021-05-29 NOTE — PROGRESS NOTES
Called patient at home due to no show today.  Patient states she forgot.  Discussed no show/late cancel policy, reminded patient of importance of making it to appts.  Loretta Barney, ZHANGSW

## 2021-05-29 NOTE — TELEPHONE ENCOUNTER
Left message to call back for: No name given-[facility]  Information to relay to patient:  None- what is the question from pharmacy?

## 2021-05-29 NOTE — TELEPHONE ENCOUNTER
[facility] covering for Dr. Puente    Pt has a home visiting nurse and transferring prescriptions to [place].   Pharmacy is sending meds to her nurse on 6/24/19    Date of Last Office Visit: 5/7/19  Date of Next Office Visit: 7/9/19  No shows since last visit: 5/30/19  Cancellations since last visit: 5/23/19 - provider initiated; 6/6/19 - ins not active  ED visits since last visit: no    Medication Topamax 25 date last ordered: 5/7/19  Qty: 30  Refills: 1   Disp Refills Start End ISABEL   topiramate (TOPAMAX) 25 MG tablet 30 tablet 1 5/7/2019  --   Sig - Route: Take 1 tablet (25 mg total) by mouth at bedtime. - Oral       Lapse in therapy greater than 7 days: no  Medication refill request verified as identical to current order: yes  Result of Last DAM, VPA, Li+ Level, CBC, or Carbamazepine Level (at or since last visit): N/A        []Eligibility - not seen in last year    []Supervision - no future appointment    []Compliance     []Verification - order discrepancy    []Controlled Medication    []90 - day supply request    [x]Other LPN pending medications    Current Medication list:      albuterol (PROAIR HFA;PROVENTIL HFA;VENTOLIN HFA) 90 mcg/actuation inhaler Inhale 2 puffs every 4 (four) hours as needed for wheezing.   atorvastatin (LIPITOR) 20 MG tablet TAKE 1 TABLET BY MOUTH EVERY EVENING FOR CHOLESTEROL   atorvastatin (LIPITOR) 20 MG tablet TAKE 1 TABLET BY MOUTH EVERY EVENING FOR CHOLESTEROL   benztropine (COGENTIN) 1 MG tablet Take 1 tablet (1 mg total) by mouth daily.   carBAMazepine (TEGRETOL) 200 mg tablet Take 200 mg by mouth 3 (three) times a day. 2 tabs (400mg) q AM, 1.5 tabs (300mg) q PM and HS   diphenhydrAMINE HCl 25 mg TbDL Take 25 mg by mouth at bedtime.   DULoxetine (CYMBALTA) 60 MG capsule Take 2 capsules (120 mg total) by mouth daily.   EPINEPHrine 0.15 mg/0.15 mL atIn Inject 1 each into the shoulder, thigh, or buttocks.   ethotoin (PEGANONE) 250 mg Tab Take 1,000 mg by mouth 3 (three) times a day.    levothyroxine (SYNTHROID, LEVOTHROID) 200 MCG tablet TAKE 1 TABLET(200 MCG) BY MOUTH DAILY   melatonin 3 mg Tab tablet Take 1 tablet (3 mg total) by mouth at bedtime as needed.   nicotine polacrilex (NICORETTE) 2 mg gum Apply 1 each (2 mg total) to the mouth or throat as needed for smoking cessation.   omeprazole (PRILOSEC) 20 MG capsule TAKE 1 CAPSULE BY MOUTH DAILY BEFORE BREAKFAST   oxybutynin (DITROPAN XL) 10 MG ER tablet TAKE 1 TABLET BY MOUTH EVERY DAY   polyethylene glycol (MIRALAX) 17 gram packet Take 17 g by mouth daily as needed.    QUEtiapine (SEROQUEL) 200 MG tablet Take 1 tablet (200 mg total) by mouth at bedtime (generic. Seroquel)   risperiDONE (RISPERDAL) 0.5 MG tablet Take 1 tablet (0.5 mg total) by mouth at bedtime.   rosuvastatin (CRESTOR) 20 MG tablet Take 1 tablet (20 mg total) by mouth daily.   senna (SENOKOT) 8.6 mg tablet Take 2 tablets by mouth 2 (two) times a day.   topiramate (TOPAMAX) 25 MG tablet Take 1 tablet (25 mg total) by mouth at bedtime.   traZODone (DESYREL) 100 MG tablet Take 2 tablets (200 mg total) by mouth at bedtime.   VITAMIN D3 2,000 unit capsule TAKE 1 CAPSULE BY MOUTH DAILY   warfarin (COUMADIN) 5 MG tablet Take 2 tablet by mouth two days per week and take 1 and 1/2 tablet by mouth the other 5 days of the week       Medication Plan of Care at last office visit with MD/CNP:    PLAN:    Plan:       1.  Teaching done on risks of untreated sleep apnea.  Referral to    sleep clinic.  2.  Pt to make appt with Dr. Rooney- discuss bariatric and dietitian referral.  3. Topamax 25 mg nightly for appetite.  Micromedix drug-drug interaction checked.  4.  Continue other meds as above, f/u 2 mos.  5.  Continue therapy      [place], [place], [place], and [place] : NA

## 2021-05-29 NOTE — TELEPHONE ENCOUNTER
[facility], [facility] [address] 60 Day Summary.  Please review, make changes, sign, and fax back.  Located in file drawer.

## 2021-05-29 NOTE — TELEPHONE ENCOUNTER
Refill Approved    Rx renewed per Medication Renewal Policy. Medication was last renewed on 1/10/19 Levothyroid  3/25/19 Lipitor .    Paula Enrique, [facility] Triage/Med Refill 5/26/2019     Requested Prescriptions   Pending Prescriptions Disp Refills     levothyroxine (SYNTHROID, LEVOTHROID) 200 MCG tablet [Pharmacy Med Name: LEVOTHYROXINE SOD 0.2MG(200MCG) TAB] 90 tablet 11     Sig: TAKE 1 TABLET(200 MCG) BY MOUTH DAILY       Thyroid Hormones Protocol Passed - 5/26/2019 10:10 AM        Passed - Provider visit in past 12 months or next 3 months     Last office visit with prescriber/PCP: 3/20/2019 Donald Rooney MD OR same dept: 3/20/2019 Donald Rooney MD OR same specialty: 3/20/2019 Donald Rooney MD  Last physical: 9/13/2018 Last MTM visit: Visit date not found   Next visit within 3 mo: Visit date not found  Next physical within 3 mo: Visit date not found  Prescriber OR PCP: Donald Rooney MD  Last diagnosis associated with med order: 1. Hyperparathyroidism, s/p resection adenoma  - levothyroxine (SYNTHROID, LEVOTHROID) 200 MCG tablet [Pharmacy Med Name: LEVOTHYROXINE SOD 0.2MG(200MCG) TAB]; TAKE 1 TABLET(200 MCG) BY MOUTH DAILY  Dispense: 90 tablet; Refill: 11    2. Mixed hyperlipidemia  - atorvastatin (LIPITOR) 20 MG tablet [Pharmacy Med Name: ATORVASTATIN 20MG TABLETS]; TAKE 1 TABLET BY MOUTH EVERY EVENING FOR CHOLESTEROL  Dispense: 90 tablet; Refill: 0    If protocol passes may refill for 12 months if within 3 months of last provider visit (or a total of 15 months).             Passed - TSH on file in past 12 months for patient age 12 & older     TSH   Date Value Ref Range Status   02/06/2019 4.17 0.30 - 5.00 uIU/mL Final                   atorvastatin (LIPITOR) 20 MG tablet [Pharmacy Med Name: ATORVASTATIN 20MG TABLETS] 90 tablet 0     Sig: TAKE 1 TABLET BY MOUTH EVERY EVENING FOR CHOLESTEROL       Statins Refill Protocol (Hmg CoA Reductase Inhibitors) Passed - 5/26/2019  10:10 AM        Passed - PCP or prescribing provider visit in past 12 months      Last office visit with prescriber/PCP: 3/20/2019 Donald Rooney MD OR same dept: 3/20/2019 Donald Rooney MD OR same specialty: 3/20/2019 Donald Rooney MD  Last physical: 9/13/2018 Last MTM visit: Visit date not found   Next visit within 3 mo: Visit date not found  Next physical within 3 mo: Visit date not found  Prescriber OR PCP: Donald Rooney MD  Last diagnosis associated with med order: 1. Hyperparathyroidism, s/p resection adenoma  - levothyroxine (SYNTHROID, LEVOTHROID) 200 MCG tablet [Pharmacy Med Name: LEVOTHYROXINE SOD 0.2MG(200MCG) TAB]; TAKE 1 TABLET(200 MCG) BY MOUTH DAILY  Dispense: 90 tablet; Refill: 11    2. Mixed hyperlipidemia  - atorvastatin (LIPITOR) 20 MG tablet [Pharmacy Med Name: ATORVASTATIN 20MG TABLETS]; TAKE 1 TABLET BY MOUTH EVERY EVENING FOR CHOLESTEROL  Dispense: 90 tablet; Refill: 0    If protocol passes may refill for 12 months if within 3 months of last provider visit (or a total of 15 months).

## 2021-05-30 ENCOUNTER — RECORDS - HEALTHEAST (OUTPATIENT)
Dept: ADMINISTRATIVE | Facility: CLINIC | Age: 61
End: 2021-05-30

## 2021-05-30 VITALS — BODY MASS INDEX: 47.09 KG/M2 | WEIGHT: 293 LBS | HEIGHT: 66 IN

## 2021-05-30 VITALS — WEIGHT: 293 LBS | HEIGHT: 66 IN | BODY MASS INDEX: 47.09 KG/M2

## 2021-05-30 VITALS — WEIGHT: 293 LBS | BODY MASS INDEX: 47.09 KG/M2 | HEIGHT: 66 IN

## 2021-05-30 NOTE — TELEPHONE ENCOUNTER
Date of Last Office Visit: 05/07/19  Date of Next Office Visit: 08/20/19  No shows since last visit: 7/09/19-per pt Headache; 5/30/19 Lt Cx-per pt migraine  Cancellations since last visit: 6/06/19 w/Nurse-Ins not active; 5/23/19 w/nurse-provider   ED visits since last visit:  None  Medication risperidone 0.5 mg tablet date last ordered: 5/20/19  Qty: 30  Refills: 1  Lapse in therapy greater than 7 days: yes  Medication refill request verified as identical to current order: yes  Result of Last DAM, VPA, Li+ Level, CBC, or Carbamazepine Level (at or since last visit): NA     [] Medication refilled per AC M-1.   [] Medication unable to be refilled by RN due to criteria not met as indicated below:     []Eligibility - not seen in last year    []Supervision - no future appointment    [x]Compliance  NS/Lt Cx: 7/09/19 per pt had headache    []Verification - order discrepancy    []Controlled Medication    []Medication not included in RN Protocol    []90 - day supply request    [x]Other CMA pending medications   Current Medication list:    Valentina ORNELAS Juan Ramonbach    (MRN [id number])   Your Current Medications Are     albuterol (PROAIR HFA;PROVENTIL HFA;VENTOLIN HFA) 90 mcg/actuation inhaler Inhale 2 puffs every 4 (four) hours as needed for wheezing.   atorvastatin (LIPITOR) 20 MG tablet TAKE 1 TABLET BY MOUTH EVERY EVENING FOR CHOLESTEROL   atorvastatin (LIPITOR) 20 MG tablet TAKE 1 TABLET BY MOUTH EVERY EVENING FOR CHOLESTEROL   benztropine (COGENTIN) 1 MG tablet Take 1 tablet (1 mg total) by mouth daily.   carBAMazepine (TEGRETOL) 200 mg tablet Take 200 mg by mouth 3 (three) times a day. 2 tabs (400mg) q AM, 1.5 tabs (300mg) q PM and HS   diphenhydrAMINE HCl 25 mg TbDL Take 25 mg by mouth at bedtime.   DULoxetine (CYMBALTA) 60 MG capsule Take 2 capsules (120 mg total) by mouth daily.   EPINEPHrine 0.15 mg/0.15 mL atIn Inject 1 each into the shoulder, thigh, or buttocks.   ethotoin (PEGANONE) 250 mg Tab Take 1,000 mg by mouth 3  (three) times a day.   levothyroxine (SYNTHROID, LEVOTHROID) 200 MCG tablet TAKE 1 TABLET(200 MCG) BY MOUTH DAILY   melatonin 3 mg Tab tablet Take 1 tablet (3 mg total) by mouth at bedtime as needed.   nicotine polacrilex (NICORETTE) 2 mg gum Apply 1 each (2 mg total) to the mouth or throat as needed for smoking cessation.   omeprazole (PRILOSEC) 20 MG capsule TAKE 1 CAPSULE BY MOUTH DAILY BEFORE BREAKFAST   oxybutynin (DITROPAN XL) 10 MG ER tablet TAKE 1 TABLET BY MOUTH EVERY DAY   polyethylene glycol (MIRALAX) 17 gram packet Take 17 g by mouth daily as needed.    QUEtiapine (SEROQUEL) 200 MG tablet Take 1 tablet (200 mg total) by mouth at bedtime (generic. Seroquel)   risperiDONE (RISPERDAL) 0.5 MG tablet Take 1 tablet (0.5 mg total) by mouth at bedtime.   rosuvastatin (CRESTOR) 20 MG tablet Take 1 tablet (20 mg total) by mouth daily.   senna (SENOKOT) 8.6 mg tablet Take 2 tablets by mouth 2 (two) times a day.   topiramate (TOPAMAX) 25 MG tablet Take 1 tablet (25 mg total) by mouth at bedtime.   traZODone (DESYREL) 100 MG tablet Take 2 tablets (200 mg total) by mouth at bedtime.   VITAMIN D3 2,000 unit capsule TAKE 1 CAPSULE BY MOUTH DAILY   warfarin (COUMADIN) 5 MG tablet Take 2 tablet by mouth two days per week and take 1 and 1/2 tablet by mouth the other 5 days of the week       Medication Plan of Care at last office visit with MD/CNP: Office Visit 5/07/19 Dr. Puente  Plan:       1.  Teaching done on risks of untreated sleep apnea.  Referral to    sleep clinic.  2.  Pt to make appt with Dr. Rooney- discuss bariatric and dietitian referral.  3. Topamax 25 mg nightly for appetite.  Micromedix drug-drug interaction checked.  4.  Continue other meds as above, f/u 2 mos.  5.  Continue therapy

## 2021-05-30 NOTE — TELEPHONE ENCOUNTER
ANTICOAGULATION  MANAGEMENT PROGRAM    Valentina Olmedo is overdue for INR check.  Reminder call made.    Left message for Valentina. If returning call, please schedule INR check as soon as possible.    Angella Caraballo RN

## 2021-05-30 NOTE — TELEPHONE ENCOUNTER
ANTICOAGULATION  MANAGEMENT    Assessment     Today's INR result of 1.80 is Subtherapeutic (goal INR of 2.0-3.0)        Less warfarin taken than instructed which may be affecting INR    No new diet changes affecting INR    No new medication/supplements affecting INR    Continues to tolerate warfarin with no reported s/s of bleeding or thromboembolism     Previous INR was Therapeutic at 2.10 on 5/13/19.   (last 2 INR's therapeutic.)    Plan:     Spoke with Domenico regarding INR result and instructed:     Warfarin Dosing Instructions:  (has 5 mg tabs)   - today, advised one time booster with 10mg warfarin dose,   - then continue current warfarin dose 7.5 mg daily.    Instructed patient to follow up no later than:  1-2 wks.    Education provided: importance of consistent vitamin K intake, target INR goal and significance of current INR result, importance of taking warfarin as instructed and importance of notifying clinic for changes in medications    Domenico verbalizes understanding and agrees to warfarin dosing plan.    [facility], questions or concerns. (#[phone])   ?   Shonna Ernandez RN    Subjective/Objective:      Domenico Olmedo, a 59 y.o. female is on warfarin.     Domenico reports:     Home warfarin dose: as updated on anticoagulation calendar per template     Missed doses: Yes:  Reported missing 2.5mg warfarin dose 3 days ago.     Medication changes:  No     S/S of bleeding or thromboembolism:  No     New Injury or illness:  Yes:  F/u visit today with Dr. Rooney - numbness of both feet.  Referral to Neurology.     Changes in diet or alcohol consumption:  No     Upcoming surgery, procedure or cardioversion:  No    Anticoagulation Episode Summary     Current INR goal:   2.0-3.0   TTR:   59.6 % (1.9 y)   Next INR check:   7/26/2019   INR from last check:   1.80! (7/12/2019)   Weekly max warfarin dose:      Target end date:      INR check location:      Preferred lab:      Send  INR reminders to:   [facility]    Indications    Pulmonary embolism with infarction (H) [I26.99]           Comments:            Anticoagulation Care Providers     Provider Role Specialty Phone number    Donald Rooney MD Referring Internal Medicine [phone]

## 2021-05-30 NOTE — PROGRESS NOTES
Office Visit - Follow Up   Valentina Olmedo   59 y.o. female    Date of Visit: 7/12/2019    Chief Complaint   Patient presents with     Cerumen Impaction     feet numbness        Assessment and Plan   1. 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  Check labs as below.  I have encouraged her to see neurology for further evaluation  - Comprehensive Metabolic Panel  - Glycosylated Hemoglobin A1c  - HM2(CBC w/o Differential)  - Electrophoresis, Protein, Serum, Cascade  - Syphilis Screen, [facility]  - Ambulatory referral to Neurology    2. Schizoaffective disorder, depressive type (H)  Generally stable on numerous medication, has had quite a bit of weight gain, follow-up with psychiatry    3. Abnormal finding of blood chemistry   - Glycosylated Hemoglobin A1c    4. Screening for HIV (human immunodeficiency virus)  - HIV Antigen/Antibody Screening [facility]    5. Hypothyroidism due to acquired atrophy of thyroid  - Thyroid Cascade    6. Nonintractable epilepsy without status epilepticus, unspecified epilepsy type (H)  Stable, follow-up with epilepsy neurologist per routine    7. Pulmonary embolism with infarction (H)  Continue warfarin    8. Nicotine dependence, cigarettes, with other nicotine-induced disorders  Stop smoking but is gained weight, encouraged her to limit the amount of food she eats    9. Essential hypertension with goal blood pressure less than 140/90  Blood pressure control continue current plan    10. DVT (deep venous thrombosis) (H)  Above  - INR    11. Pulmonary embolism (H)  Above  - INR    12. Morbid obesity with BMI of 45.0-49.9, adult (H)  - Ambulatory referral to Bariatric Care: Surgical and Non-Surgical  The following high BMI interventions were performed this visit: encouragement to exercise and lifestyle education regarding diet    Return in about 4 weeks (around 8/9/2019) for recheck.     History of Present Illness   This 59 y.o. old woman comes in for  "follow-up.  Since her last visit she has stopped smoking which is excellent, but she has gained 20 pounds.  She is able to walk but often gets around by wheelchair.  She had broken her fifth metatarsal and I recommended she see orthopedic surgery which she did not keep this appointment.  Her foot is not bothering her much anymore.  She does continue to have numbness in her feet.  She is on numerous seizure medication and medications for schizophrenia.  She has not had a history of diabetes, she does have hypothyroidism.  She denies any weakness in her legs but is somewhat limited by her weight    Review of Systems: A comprehensive review of systems was negative except as noted.     Medications, Allergies and Problem List   Reviewed, reconciled and updated  Post Discharge Medication Reconciliation Status:      Physical Exam   General Appearance:   No acute distress    /86 (Patient Site: Left Arm, Patient Position: Sitting, Cuff Size: Adult Regular)   Pulse 91   Ht 5' 6\" (1.676 m)   Wt (!) 356 lb (161.5 kg)   SpO2 96%   BMI 57.46 kg/m      Is in a wheelchair heart rate controlled lungs clear abdomen obese soft she has good strength in her lower extremity.  Some numbness with light palpation to her feet     Additional Information   Current Outpatient Medications   Medication Sig Dispense Refill     albuterol (PROAIR HFA;PROVENTIL HFA;VENTOLIN HFA) 90 mcg/actuation inhaler Inhale 2 puffs every 4 (four) hours as needed for wheezing. 1 Inhaler 6     atorvastatin (LIPITOR) 20 MG tablet TAKE 1 TABLET BY MOUTH EVERY EVENING FOR CHOLESTEROL 30 tablet 0     atorvastatin (LIPITOR) 20 MG tablet TAKE 1 TABLET BY MOUTH EVERY EVENING FOR CHOLESTEROL 90 tablet 3     benztropine (COGENTIN) 1 MG tablet Take 1 tablet (1 mg total) by mouth daily. 30 tablet 6     carBAMazepine (TEGRETOL) 200 mg tablet Take 200 mg by mouth 3 (three) times a day. 2 tabs (400mg) q AM, 1.5 tabs (300mg) q PM and HS       diphenhydrAMINE HCl 25 mg " TbDL Take 25 mg by mouth at bedtime. 30 each 6     DULoxetine (CYMBALTA) 60 MG capsule Take 2 capsules (120 mg total) by mouth daily. 60 capsule 6     EPINEPHrine 0.15 mg/0.15 mL atIn Inject 1 each into the shoulder, thigh, or buttocks.       ethotoin (PEGANONE) 250 mg Tab Take 1,000 mg by mouth 3 (three) times a day.       levothyroxine (SYNTHROID, LEVOTHROID) 200 MCG tablet TAKE 1 TABLET(200 MCG) BY MOUTH DAILY 90 tablet 3     melatonin 3 mg Tab tablet Take 1 tablet (3 mg total) by mouth at bedtime as needed. 30 tablet 2     nicotine polacrilex (NICORETTE) 2 mg gum Apply 1 each (2 mg total) to the mouth or throat as needed for smoking cessation. 220 each 2     omeprazole (PRILOSEC) 20 MG capsule TAKE 1 CAPSULE BY MOUTH DAILY BEFORE BREAKFAST 90 capsule 3     oxybutynin (DITROPAN XL) 10 MG ER tablet TAKE 1 TABLET BY MOUTH EVERY DAY 30 tablet 11     polyethylene glycol (MIRALAX) 17 gram packet Take 17 g by mouth daily as needed.        QUEtiapine (SEROQUEL) 200 MG tablet Take 1 tablet (200 mg total) by mouth at bedtime (generic. Seroquel) 90 tablet 1     risperiDONE (RISPERDAL) 0.5 MG tablet Take 1 tablet (0.5 mg total) by mouth at bedtime. 30 tablet 1     rosuvastatin (CRESTOR) 20 MG tablet Take 1 tablet (20 mg total) by mouth daily. 90 tablet 3     senna (SENOKOT) 8.6 mg tablet Take 2 tablets by mouth 2 (two) times a day. 120 tablet 0     topiramate (TOPAMAX) 25 MG tablet Take 1 tablet (25 mg total) by mouth at bedtime. 30 tablet 6     traZODone (DESYREL) 100 MG tablet Take 2 tablets (200 mg total) by mouth at bedtime. 180 tablet 1     VITAMIN D3 2,000 unit capsule TAKE 1 CAPSULE BY MOUTH DAILY 30 capsule 11     warfarin (COUMADIN) 5 MG tablet Take 2 tablet by mouth two days per week and take 1 and 1/2 tablet by mouth the other 5 days of the week 50 tablet 19     No current facility-administered medications for this visit.      Allergies   Allergen Reactions     Aspirin (Tartrazine Only) Shortness Of Breath      Can have asa as long as no yellow dye     Gabapentin Rash     Guilherme Johnsons rash     Venom-Honey Bee Anaphylaxis     Vistaril [Hydroxyzine Pamoate] Other (See Comments)     Excessive sedation     Yellow Dye Shortness Of Breath     Linked only to tartazine.     Hydroxyzine Other (See Comments)     Sluggish; unable to wake up     Latex Itching     Other Environmental Allergy      Dust, Mold, Pollen     Phenobarbital Other (See Comments)     unknown     Latex Itching     Social History     Tobacco Use     Smoking status: Former Smoker     Packs/day: 2.00     Years: 35.00     Pack years: 70.00     Types: Cigarettes     Start date: 6/10/1974     Smokeless tobacco: Never Used     Tobacco comment: smoking 1-2 a week   Substance Use Topics     Alcohol use: No     Drug use: No       Review and/or order of clinical lab tests:  Review and/or order of radiology tests:  Review and/or order of medicine tests:  Discussion of test results with performing physician:  Decision to obtain old records and/or obtain history from someone other than the patient:  Review and summarization of old records and/or obtaining history from someone other than the patient and.or discussion of case with another health care provider:  Independent visualization of image, tracing or specimen itself:    Time:      Donald Rooney MD

## 2021-05-30 NOTE — TELEPHONE ENCOUNTER
ANTICOAGULATION  MANAGEMENT PROGRAM    Valentina Olmedo is overdue for INR check.  Reminder call made.    Spoke with Ayden and left message for Valentina. If returning call, please schedule INR check as soon as possible.    Angella Caraballo RN

## 2021-05-31 VITALS — WEIGHT: 293 LBS | HEIGHT: 66 IN | BODY MASS INDEX: 47.09 KG/M2

## 2021-05-31 VITALS — BODY MASS INDEX: 47.09 KG/M2 | HEIGHT: 66 IN | WEIGHT: 293 LBS

## 2021-05-31 VITALS — HEIGHT: 66 IN | BODY MASS INDEX: 53.75 KG/M2

## 2021-05-31 VITALS — HEIGHT: 66 IN | BODY MASS INDEX: 47.09 KG/M2 | WEIGHT: 293 LBS

## 2021-05-31 NOTE — PROGRESS NOTES
"Mental Health Visit Note    8/22/2019    Start time: 1:10pm    Stop Time: 1:56pm  Session # 4    Session Type: Patient is presenting for an Individual session.   Persons present include patient and therapist, patient's spouse is present for collateral in session and per patient request, and because he provides transport.    Valentina Olmedo is a 59 y.o. female is being seen today for   Chief Complaint   Patient presents with     MH Follow Up     Depression     low mood in the context of multiple medical issues, social isolation     Anxiety     anxiety in the context of multiple medical issues     PTSD     chronic; unable to make use of specific PTSD treatments   .     New symptoms or complaints: None    Functional Impairment:   Personal: 3  Family: 2-3 (no contact with family apart from spouse)  Work: 4  Social: 3-4  (no social support apart from )    Clinical assessment of mental status: [Same as 5/9/2019]  Patient's grooming is Well groomed, his attire is Appropriate, and his age appears Appears Stated. Behavior towards examiner is Cooperative, motor activity is Within normal, and eye contact is Staring.  Patient's mood is Depressed, and affect is Blunted, Flat and Depressed.  Speech/language is Delayed/Hesitant, attention is Distractible, and concentration is Brief. Thought process is Slow, thought content is Within noraml and  Within normal.  Patient's orientation is X 3, memory is  Impairment and Recent, judgement is Impairment and Minimal, and estimated intelligence is Below Average. Demonstrated insight is Adequate while fund of knowledge is adequate.    Suicidal/Homicidal Ideation present:  Patient reports occasional passive SI since last session. She denies active plan or intent.  States she would not do it because of her .  Agrees to call 911 or present to ER if she has impulses to harm herself.    Patient's impression of their current status:   \"I wished I was dead because he kept going and " "going and going into the hospital.\"  Patient reports occasional passive SI since last session. She denies active plan or intent.  States she would not do it because of her .    Patient processes multiple stressors with 's esophogeal bleed and multiple hospitalizations.  States she's not driven for a year and she was \"too scared to drive\" to go and visit him in the hospital.  She was \"lonely\" and \"desparate\".  Endorses passive SI, denies plan, means, or intent. Agrees to call 911 or present to ER if she has impulses to harm herself.   He no longer has psychotherapy available because of insurance issues, and patient feels her spouse needs more support than she can offer.    Patient reports mixed  follow-up with therapy homework: She continues to meet with her own , states \"I am not sure\" as regards whether they discussed ways to get more social support in the community.  States that she is not willing to attend mental health illness and recovery goal setting group without her spouse who is no longer attending because of insurance issues.  She utilizes deep breathing and grounding skills for acute anxiety.  States she has made some efforts to get up and dressed and out of the bedroom during the daytime.  Together they have made a couple of excursions into the community but not twice a week.    Therapist impression of patients current state: Recent acute anxiety and mood issues due to spouse's mental and medical health conditions and multiple hospitalizations.  Some rather chronic suicidal ideation has reoccurred but patient is able to verify safety plan and sustaining factors that would prevent her from suicide.  Her social support system remains poor and she would benefit from increased support via a therapy group for IOP, which she declines.  She is nonetheless maintaining herself in the community without crisis intervention or mental health emergency, which is progress given this patient's " "chronicity and history of lengthy civil commitment.    Processed negative cognitions, reinforced strengths, validated patient efforts and feelings.   Brainstormed options for increased support for spouse so that patient's own stress is reduced.  Discussed the benefit of becoming more independent in transportation and community engagement.  Brainstormed transportation options should she need to visit spouse in the hospital again.  Offered patient increased frequency of psychotherapy sessions, which she declines stating that she wants to continue coming every 4 weeks.    Type of psychotherapeutic technique provided: Client centered, Solution-focused and CBT      Progress toward short term goals: Mixed progress: She is working with both her own and her 's  but has not identified increased support options in the community.  Practicing deep breathing and grounding in the senses \"once or twice\" for acute anxiety.  She continues efforts to get up and dressed and out of the bedroom during the daytime but admits that sometimes she just does not feel like moving.  She has been able to attempt very little walking because of recovery from a broken foot (arrives in a wheelchair today).    Review of long term goals: Not done at today's visit and Date of last review 4/25/2019    Diagnosis:   1. Depression, major, recurrent, moderate (H)    2. Generalized anxiety disorder    3. Borderline personality disorder (H)    4. Chronic post-traumatic stress disorder (PTSD)    5. Cognitive deficits        Plan and Follow up: Patient will return for follow up in four weeks.  Patient will work with her own and her 's  to identify increased support options for each of them in the community.  She will register for and attend mental health illness and recovery goalsetting group.  She will utilize deep breathing and grounding her senses as practice and session for acute anxiety.  She will continue efforts to " get up and dressed and out of the bedroom during the daytime.  She will travel with her  to make excursions into the community at least 2 times per week.    Discharge Criteria/Planning: Client has chronic symptoms and ongoing therapy for maintenance stability recommended.    Loretta BRINK, LICSW  8/22/2019

## 2021-05-31 NOTE — TELEPHONE ENCOUNTER
ANTICOAGULATION MANAGEMENT PROGRAM--Non-Compliance Chart Review    Valentina Olmedo is overdue for INR follow up.      INR Results:   Lab Results   Component Value Date    INR 1.80 (H) 07/12/2019    INR 2.10 (H) 05/13/2019    INR 2.00 (H) 04/26/2019         Chart reviewed, continue non-compliance protocol       Fanta Smith RN

## 2021-05-31 NOTE — PROGRESS NOTES
"Date of Service:  2019    Name:  Valentina Olmedo  :  1960  MRN:  [id number]    HPI:   Valentina Olmedo 59 y.o. female with neurocognitive deficits, borderline personality disorder, major depressive disorder, seizure disorder, CHRIS, hypothyroid, on warfarin for PE. She is accompanied by [place] CM, \"Patty\".   Last seen in May.     This is last day w/CM Patty. Pt got CADI waiver. Will be getting new CADI  CM (Jose Morfin), ILS, meals, homemaker.    R/o CHRIS, multiple referrals and multiple NS's to sleep clinic  (last time ).    Pt reports failed Neuropsych appt 19.    Neurocognitive deficits  Neuropsych testing done (3/20/18).  Note excerpt: \"Implications/recommendations:  Her current test performance demonstrates significant decline relative to last evaluation in .The possibility of significant medication side effects secondary to her anti-epileptic and psychotropic drug regime should be explored, as well possible onset of dementia.\"  --Irais De La Cruz PhD at [facility], 18-----    Appt w/PCP Dr Rooney on .  Referred to bariatric clinic and was to f/u with Dr Rooney in 4 weeks (pt has not f/u on these).  CM Patty said pt doesn't answer phone and misses appts.    ROS: R sided HA, Pt c/o memory problems, insomnia, snoring, gasping, bilateral feet numbness, weight gain 20 pounds since 2019.    Nicotine- used to smoke 1ppd. Now smokes ~2-3 cigs/day.    Therapy-individual therapy with Loretta.    Illness Recovery and Management group with [facility] (on hold for now).    Social  Lives with  in [facility] in [place].  Has 3 cats-Yulisa Camarillo Parker.    Weekly Med set- up.    Medical  Seizure disorder, Neurologist Praveena Benitez, [facility]    CHRIS, Dx'd at [facility] by Benjie Allan MD 12/10/2013, \"AutoCPAP 5-20 cms of water is recommended, while patient sleeps\".  Sleep study done at [facility] 2015- \"Pt did not have any significant sleep apnea during her test\", " 4/6/15 Fernando Montaño MD     Hypothyroid    On warfarin for P.E, April 2017       Medications:       Current Outpatient Medications on File Prior to Visit   Medication Sig Dispense Refill     albuterol (PROAIR HFA;PROVENTIL HFA;VENTOLIN HFA) 90 mcg/actuation inhaler Inhale 2 puffs every 4 (four) hours as needed for wheezing. 1 Inhaler 6     atorvastatin (LIPITOR) 20 MG tablet TAKE 1 TABLET BY MOUTH EVERY EVENING FOR CHOLESTEROL 30 tablet 0     atorvastatin (LIPITOR) 20 MG tablet TAKE 1 TABLET BY MOUTH EVERY EVENING FOR CHOLESTEROL 90 tablet 3     carBAMazepine (TEGRETOL) 200 mg tablet Take 200 mg by mouth 3 (three) times a day. 2 tabs (400mg) q AM, 1.5 tabs (300mg) q PM and HS       EPINEPHrine 0.15 mg/0.15 mL atIn Inject 1 each into the shoulder, thigh, or buttocks.       ethotoin (PEGANONE) 250 mg Tab Take 1,000 mg by mouth 3 (three) times a day.       levothyroxine (SYNTHROID, LEVOTHROID) 200 MCG tablet TAKE 1 TABLET(200 MCG) BY MOUTH DAILY 90 tablet 3     nicotine polacrilex (NICORETTE) 2 mg gum Apply 1 each (2 mg total) to the mouth or throat as needed for smoking cessation. 220 each 2     omeprazole (PRILOSEC) 20 MG capsule TAKE 1 CAPSULE BY MOUTH DAILY BEFORE BREAKFAST 90 capsule 3     oxybutynin (DITROPAN XL) 10 MG ER tablet TAKE 1 TABLET BY MOUTH EVERY DAY 30 tablet 11     rosuvastatin (CRESTOR) 20 MG tablet Take 1 tablet (20 mg total) by mouth daily. 90 tablet 3     senna (SENOKOT) 8.6 mg tablet Take 2 tablets by mouth 2 (two) times a day. 120 tablet 0     VITAMIN D3 2,000 unit capsule TAKE 1 CAPSULE BY MOUTH DAILY 30 capsule 11     warfarin (COUMADIN) 5 MG tablet Take 2 tablet by mouth two days per week and take 1 and 1/2 tablet by mouth the other 5 days of the week 50 tablet 19     [DISCONTINUED] benztropine (COGENTIN) 1 MG tablet Take 1 tablet (1 mg total) by mouth daily. 30 tablet 6     [DISCONTINUED] diphenhydrAMINE HCl 25 mg TbDL Take 25 mg by mouth at bedtime. 30 each 6     [DISCONTINUED] DULoxetine  (CYMBALTA) 60 MG capsule Take 2 capsules (120 mg total) by mouth daily. 60 capsule 6     [DISCONTINUED] melatonin 3 mg Tab tablet Take 1 tablet (3 mg total) by mouth at bedtime as needed. 30 tablet 2     [DISCONTINUED] QUEtiapine (SEROQUEL) 200 MG tablet Take 1 tablet (200 mg total) by mouth at bedtime (generic. Seroquel) 90 tablet 1     [DISCONTINUED] risperiDONE (RISPERDAL) 0.5 MG tablet TAKE 1 TABLET BY MOUTH EVERY NIGHT AT BEDTIME 30 tablet 1     [DISCONTINUED] topiramate (TOPAMAX) 25 MG tablet Take 1 tablet (25 mg total) by mouth at bedtime. 30 tablet 6     [DISCONTINUED] traZODone (DESYREL) 100 MG tablet Take 2 tablets (200 mg total) by mouth at bedtime. 180 tablet 1     polyethylene glycol (MIRALAX) 17 gram packet Take 17 g by mouth daily as needed.        No current facility-administered medications on file prior to visit.           Associated Clinical Documents:       Notes reviewed in [facility] including: medication reconciliation, nurses's notes, progress notes, recent labs, PMH, and OSH records.    ROS:       10 point ROS was negative except for the items listed in HPI.      MSE:      Vital signs: refer to nursing notes from today.   Alert & oriented x 3.  Poor eye contact  Appearance: Appears stated age, casually dressed.  Speech: Normal rate, rhythm and tone.  Gait: wide-based gait, wheely walker  Musculoskeletal: Motor slowing, no abnormal movements.  Mood/Affect: dysphoric  Thought Process: Perseverative  Thought Content: Occ passive SI, no plan. No homicide ideation.  Associations: Intact, denies delusions.  Perceptions: See HPI  Memory: recent and remote memory deficit, not formally tested  Attention span and concentration: poor  Language: Intact.  Fund of Knowledge: Normal.  Insight and Judgement: fair    Impression:        1. Neurocognitive deficits-worsening memory, needs re-testing  2. Major depressive disorder, recurrent, mild  3. CHRIS- non-adherant to tx recs  4.  Obesity-worsening.      Plan:       1.  Teaching done on risks of untreated sleep apnea.          Enc sleep clinic appt.  2.  Pt to make appts with:       Dr. Rooney, Bariatric clinic, [facility]      (neuropsych test).  3.  DC Benadryl and Cogentin.  4.  Decrease trazodone 100mg q hs. Continue other meds as      above, NO visit 2 weeks, MD f/u 2 mos.  5.  Continue therapy        Total Time and Coordination of Care:       25 Minutes spent in the care of this patient with >50% of this time was spent in   Face-to-face counseling specifically discussing and educating about sleep apnea, diet, and bariatric clinic. Teaching done on the risks, benefits and side effects of medications.      This dictation was completed with speech recognition software and there may be unintended word substitutions.

## 2021-05-31 NOTE — TELEPHONE ENCOUNTER
[facility] 60 day summery.  Located in file drawer.

## 2021-06-01 ENCOUNTER — AMBULATORY - HEALTHEAST (OUTPATIENT)
Dept: LAB | Facility: CLINIC | Age: 61
End: 2021-06-01

## 2021-06-01 ENCOUNTER — COMMUNICATION - HEALTHEAST (OUTPATIENT)
Dept: ANTICOAGULATION | Facility: CLINIC | Age: 61
End: 2021-06-01

## 2021-06-01 VITALS — BODY MASS INDEX: 47.61 KG/M2 | HEIGHT: 66 IN

## 2021-06-01 VITALS — BODY MASS INDEX: 46.28 KG/M2 | HEIGHT: 66 IN | WEIGHT: 288 LBS

## 2021-06-01 VITALS — HEIGHT: 66 IN | BODY MASS INDEX: 47.09 KG/M2 | WEIGHT: 293 LBS

## 2021-06-01 VITALS — HEIGHT: 66 IN | WEIGHT: 293 LBS | BODY MASS INDEX: 47.09 KG/M2

## 2021-06-01 VITALS — WEIGHT: 293 LBS | HEIGHT: 66 IN | BODY MASS INDEX: 47.09 KG/M2

## 2021-06-01 VITALS — WEIGHT: 283 LBS | HEIGHT: 66 IN | BODY MASS INDEX: 45.48 KG/M2

## 2021-06-01 DIAGNOSIS — Z86.711 HISTORY OF PULMONARY EMBOLISM: ICD-10-CM

## 2021-06-01 LAB — INR PPP: 1.7 (ref 0.9–1.1)

## 2021-06-01 NOTE — PROGRESS NOTES
Behavioral Care Nurse Only Visit    9/19/2019  Last visit: 8/20/19            Chief Complaint   Patient presents with     CCC RN Assessment           Current Outpatient Medications:      albuterol (PROAIR HFA;PROVENTIL HFA;VENTOLIN HFA) 90 mcg/actuation inhaler, Inhale 2 puffs every 4 (four) hours as needed for wheezing., Disp: 1 Inhaler, Rfl: 6     atorvastatin (LIPITOR) 20 MG tablet, TAKE 1 TABLET BY MOUTH EVERY EVENING FOR CHOLESTEROL, Disp: 30 tablet, Rfl: 0     atorvastatin (LIPITOR) 20 MG tablet, TAKE 1 TABLET BY MOUTH EVERY EVENING FOR CHOLESTEROL, Disp: 90 tablet, Rfl: 3     carBAMazepine (TEGRETOL) 200 mg tablet, Take 200 mg by mouth 3 (three) times a day. 2 tabs (400mg) q AM, 1.5 tabs (300mg) q PM and HS, Disp: , Rfl:      DULoxetine (CYMBALTA) 60 MG capsule, Take 2 capsules (120 mg total) by mouth daily., Disp: 180 capsule, Rfl: 1     EPINEPHrine 0.15 mg/0.15 mL atIn, Inject 1 each into the shoulder, thigh, or buttocks., Disp: , Rfl:      ethotoin (PEGANONE) 250 mg Tab, Take 1,000 mg by mouth 3 (three) times a day., Disp: , Rfl:      levothyroxine (SYNTHROID, LEVOTHROID) 200 MCG tablet, TAKE 1 TABLET(200 MCG) BY MOUTH DAILY, Disp: 90 tablet, Rfl: 3     LORazepam (ATIVAN) 1 MG tablet, Take 1 tablet (1 mg total) by mouth 3 (three) times a day as needed for anxiety., Disp: 8 tablet, Rfl: 0     melatonin 3 mg Tab tablet, Take 1 tablet (3 mg total) by mouth at bedtime as needed., Disp: 90 tablet, Rfl: 1     nicotine polacrilex (NICORETTE) 2 mg gum, Apply 1 each (2 mg total) to the mouth or throat as needed for smoking cessation., Disp: 220 each, Rfl: 2     omeprazole (PRILOSEC) 20 MG capsule, TAKE 1 CAPSULE BY MOUTH DAILY BEFORE BREAKFAST, Disp: 90 capsule, Rfl: 3     oxybutynin (DITROPAN XL) 10 MG ER tablet, TAKE 1 TABLET BY MOUTH EVERY DAY, Disp: 30 tablet, Rfl: 11     polyethylene glycol (MIRALAX) 17 gram packet, Take 17 g by mouth daily as needed. , Disp: , Rfl:      QUEtiapine (SEROQUEL) 200 MG tablet,  "Take 1 tablet (200 mg total) by mouth at bedtime (generic. Seroquel), Disp: 90 tablet, Rfl: 1     risperiDONE (RISPERDAL) 0.5 MG tablet, Take 1 tablet (0.5 mg total) by mouth at bedtime., Disp: 90 tablet, Rfl: 1     rosuvastatin (CRESTOR) 20 MG tablet, Take 1 tablet (20 mg total) by mouth daily., Disp: 90 tablet, Rfl: 3     senna (SENOKOT) 8.6 mg tablet, Take 2 tablets by mouth 2 (two) times a day., Disp: 120 tablet, Rfl: 0     topiramate (TOPAMAX) 25 MG tablet, Take 1 tablet (25 mg total) by mouth at bedtime., Disp: 90 tablet, Rfl: 1     traZODone (DESYREL) 100 MG tablet, Take 1 tablet (100 mg total) by mouth at bedtime., Disp: 90 tablet, Rfl: 1     VITAMIN D3 2,000 unit capsule, TAKE 1 CAPSULE BY MOUTH DAILY, Disp: 30 capsule, Rfl: 11     warfarin (COUMADIN/JANTOVEN) 5 MG tablet, Take 1 &1/2 tablets (7.5mg) by mouth daily, as directed.  Adjust dose based on INR results., Disp: 140 tablet, Rfl: 1    Stated taking medications as directed.  yes If no, document variance on medication Reconciliation form.  Pregnant: no     SUBJECTIVE    Sleep disturbance: yes Not sleeping well, difficultygetting and staying asleep since d/c of benedryl and decrease in trazodone    Irritability: yes Yelling at  and doesn't know why, not often    Sad/depressed mood: no    Mood Swings/Manic Symptoms: no    Anxiety: yes One anxiety episode on 9/9/19 where she was short of breath    Panic attacks: no    Paranoia: no    Hallucinations: no    Delusions: no    Obsessive/Compulsive: no    Impaired concentration: yes Patient reports that this is baseline    Cognitive difficulties: yes Difficulty \"finding words\" and using the correct words for things     Suicide thoughts/SIB: no    Homicide thoughts/aggession:no    Verbal/physical/property  Comments:N/A    Change in interest: no    Hopeless/helpless/worthless: yes Feelings of heplessness because she is in a wheelchair    Change in energy: no    Change in libido: not evaluated    Alcohol " use: no    Drug use: no    Appetite disturbance:no    Physical symptoms: no      Vitals:    09/19/19 1224   BP: 131/80   Pulse: 75   Resp: 18   Temp: 98.1  F (36.7  C)   TempSrc: Oral   PainSc:   4   PainLoc: Back     If Pain, who will address pain? N/A    Discuss: NA    Nursing Comments: Patient is here for follow-up. Patient discussed ED visit on 9/9/19. She says that she was very anxious and that this caused shortness of breath. She said that she doesn't have panic attacks.   Patient main concern was her sleep. She says that since Dr. Puente discontinued the benadryl and decreased the trazodone, she is not sleeping well. She reports difficulty falling and staying asleep.  She would like a directive from Dr. Puente.     Patient was encouraged to have a sleep study done but she declined. She said that the masks that are used for the C-PAP cause her to feel suffocated. She was encouraged to follow up with Dr. Rooney and Bariatric services and was given the phone numbers. She says that she has an appointment with [facility] in November.             PER PATIENT:  What are the main concerns you would like addressed during your visit? Sleep issues - see note    Employment/Day program/School: No    Living Situation: Family    Ambulatory on their own: no  Status: Transport Mod. and Transport Significant  Did patient need assisting with getting weight? yes       Padmini Patel     9/19/2019/12:31 PM

## 2021-06-01 NOTE — TELEPHONE ENCOUNTER
ANTICOAGULATION  MANAGEMENT: Discharge Continuity of Care Review    Emergency room visit on  9/9/19 for shortness of breath.   - she went out for a smoke and got short of breath.   - due to anxiety (Blood gases consistent with hyperventilation syndrome)   - respiratory alkalosis.    Discharge disposition: Home    INR Results:       Recent labs: (last 7 days)     09/09/19  2030   INR 2.72*       Warfarin inpatient management: Home regimen continued    Warfarin discharge instructions: home regimen continued     Medication Changes Affecting Anticoagulation: No.  Prescribed Ativan.    Additional Factors Affecting Anticoagulation: No    Plan     No adjustment to anticoagulation plan needed    Recommended follow up is scheduled - will check when she comes in for f/u with Dr. Rooney    Anticoagulation calendar updated    Shonna Ernandez RN

## 2021-06-01 NOTE — PATIENT INSTRUCTIONS - HE
Please make an appointment with Dr. Rooney - [phone]  Please make an appointment with Bariatric - [phone]    Continue Medications as ordered.   No alcohol or unprescribed drug use.   No driving, if sedated.   Come to the Emergency Room if not feeling safe.   Call the clinic with any questions [phone].   Follow up as directed, for your appointments, per your After Visit Summary Form.

## 2021-06-01 NOTE — TELEPHONE ENCOUNTER
RN cannot approve Refill Request    RN can NOT refill this medication Protocol failed and NO refill given.         Nadeen Stubbs, [facility] Triage/Med Refill 9/18/2019    Requested Prescriptions   Pending Prescriptions Disp Refills     warfarin (COUMADIN/JANTOVEN) 5 MG tablet [Pharmacy Med Name: WARFARIN 5MG TAB] 50 tablet 19     Sig: TAKE 2 TABLETS BY MOUTH TWO DAYS PER WEEK AND TAKE 1 & 1/2 TABLETS BY MOUTH ALL OTHER DAYS OF THE WEEK       Warfarin Refill Protocol  Failed - 9/18/2019 12:52 PM        Failed -  Route to appropriate pool/provider     Last Anticoagulation Summary:   Anticoagulation Episode Summary     Current INR goal:   2.0-3.0   TTR:   57.2 %   Next INR check:   9/24/2019   INR from last check:   2.72 (9/9/2019)   Weekly max warfarin dose:      Target end date:      INR check location:      Preferred lab:      Send INR reminders to:   [facility]    Indications    Pulmonary embolism with infarction (H) [I26.99]           Comments:            Anticoagulation Care Providers     Provider Role Specialty Phone number    Donald Rooney MD Referring Internal Medicine [phone]                Passed - Provider visit in last year     Last office visit with prescriber/PCP: 7/12/2019 Donald Rooney MD OR same dept: 7/12/2019 Donald Rooney MD OR same specialty: 7/12/2019 Donald Rooney MD  Last physical: 9/13/2018 Last MTM visit: Visit date not found    Next appt within 3 mo: Visit date not found Next physical within 3 mo: Visit date not found  Prescriber OR PCP: Donald Rooney MD  Last diagnosis associated with med order: There are no diagnoses linked to this encounter.  If protocol passes may refill for 6 months if within 3 months of last provider visit (or a total of 9 months).
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
## 2021-06-01 NOTE — PROGRESS NOTES
Call placed to Valentina, and she was updated on the increase in the Trazodone 100 mg taking two of them at HS.  Valentina said she understood.  Also, was told a new script was sent to [facility] with the new directions.  Call placed to ALEYDA Marcano for Valentina, she was appreciative of the call with the medication change.  Call placed to [facility], and the Trazodone 100 mg, taking one tablet at HS was discontinued.
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
## 2021-06-02 ENCOUNTER — RECORDS - HEALTHEAST (OUTPATIENT)
Dept: ADMINISTRATIVE | Facility: CLINIC | Age: 61
End: 2021-06-02

## 2021-06-02 VITALS — BODY MASS INDEX: 47.09 KG/M2 | WEIGHT: 293 LBS | HEIGHT: 66 IN

## 2021-06-02 VITALS — HEIGHT: 66 IN | BODY MASS INDEX: 52.13 KG/M2

## 2021-06-02 VITALS — HEIGHT: 66 IN | WEIGHT: 291 LBS | BODY MASS INDEX: 46.77 KG/M2

## 2021-06-02 VITALS — WEIGHT: 293 LBS | BODY MASS INDEX: 47.09 KG/M2 | HEIGHT: 66 IN

## 2021-06-02 VITALS — BODY MASS INDEX: 47.09 KG/M2 | HEIGHT: 66 IN | WEIGHT: 293 LBS

## 2021-06-02 VITALS — WEIGHT: 293 LBS | HEIGHT: 66 IN | BODY MASS INDEX: 47.09 KG/M2

## 2021-06-02 NOTE — TELEPHONE ENCOUNTER
Please review, make changes as needed and sign the home health 60 day re certification and plan of treatment 60 day nursing summary, fax back to mohan abdi at [phone]. Form placed in  mailbox.

## 2021-06-02 NOTE — TELEPHONE ENCOUNTER
FYI - Status Update  Who is Calling: X[facility]   [phone]  Update: Calling to see when patient had her last INR, and if she is due to have another.  Okay to leave a detailed message?:  Yes

## 2021-06-02 NOTE — TELEPHONE ENCOUNTER
Called and spoke with Xiao.  ([facility])     - she sees Valentina every 2 wks.     - reported Valentina has a PCA now, who can drive her to appts.  (Valentina does not drive)     - advised to have PCA call INR appt line and schedule INR soon.    (provided tel@ [phone])

## 2021-06-02 NOTE — TELEPHONE ENCOUNTER
Called and LM with Xiao     - last INR was therapeutic on 9/9/19 at 2.72.  (6 wks ago)     - a reminder call on 10/1/19 was left with Valentina she is past due to check INR.

## 2021-06-02 NOTE — TELEPHONE ENCOUNTER
DISCUS   10/9/19 score 2  4/3/19 score 3    Additions to form:  1. Benzotropine DC'd 8/20/19.  2. New med by outside prescriber- Lorezapam 1mg three times a day prn.        This may contribute to sedation, falls, cognitive clouding.    Form given to Dominic PIERCE.

## 2021-06-02 NOTE — TELEPHONE ENCOUNTER
Anticoagulation Annual Referral Renewal Review    Valentina Olmedo's chart reviewed for annual renewal of referral to anticoagulation monitoring.        Criteria for anticoagulation nurse and/or pharmacist renewal met   Warfarin indication: DVT and PE Yes , DVT/PE with previous provider documentation patient to be on extended anticoagulation   Current with INR monitoring/compliant Yes Yes   Date of last office visit 07/12/2019 Yes, had office visit within last year   Time in Therapeutic Range (TTR) 61.58 % Yes, TTR > 60%       Valentina Olmedo met all criteria for anticoagulation management program initiated renewal.  New INR standing orders and anticoagulation referral renewal placed.      Shonna Ernandez RN  10:47 AM

## 2021-06-02 NOTE — TELEPHONE ENCOUNTER
Who is calling:  Xiao  Reason for Call:  Xiao was returning Kylah's phone call about the patient.   Date of last appointment with primary care: n/a  Okay to leave a detailed message: Yes

## 2021-06-02 NOTE — TELEPHONE ENCOUNTER
PATIENT PRESENTS IN CLINIC FOR APPOINTMENT - DID NOT GET THE MESSAGE THAT CLINIC WAS CANCELLED - WANTS YOU TO KNOW THAT SHE HAS BEEN WALKING, AND WOULD LIKE PROVIDER TO CALL HER

## 2021-06-03 ENCOUNTER — COMMUNICATION - HEALTHEAST (OUTPATIENT)
Dept: BEHAVIORAL HEALTH | Facility: HOSPITAL | Age: 61
End: 2021-06-03

## 2021-06-03 VITALS
TEMPERATURE: 98.1 F | DIASTOLIC BLOOD PRESSURE: 80 MMHG | RESPIRATION RATE: 18 BRPM | BODY MASS INDEX: 57.3 KG/M2 | WEIGHT: 293 LBS | SYSTOLIC BLOOD PRESSURE: 131 MMHG | HEART RATE: 75 BPM

## 2021-06-03 VITALS
BODY MASS INDEX: 57.3 KG/M2 | HEART RATE: 121 BPM | SYSTOLIC BLOOD PRESSURE: 124 MMHG | DIASTOLIC BLOOD PRESSURE: 76 MMHG | WEIGHT: 293 LBS | OXYGEN SATURATION: 96 %

## 2021-06-03 VITALS — BODY MASS INDEX: 47.09 KG/M2 | HEIGHT: 66 IN | WEIGHT: 293 LBS

## 2021-06-03 VITALS — HEIGHT: 66 IN | WEIGHT: 293 LBS | BODY MASS INDEX: 47.09 KG/M2

## 2021-06-03 VITALS — HEIGHT: 66 IN | BODY MASS INDEX: 47.09 KG/M2 | WEIGHT: 293 LBS

## 2021-06-03 NOTE — TELEPHONE ENCOUNTER
Called Xiao BOO  (she is from [facility]) [phone]     - advised to call Valentina's PCA  (Salvador) to schedule next INR within the next 1-2 wks.     - INR was high at 3.50     - also gave warfarin instructions from 11/6.     - UC sent out and still pending.

## 2021-06-03 NOTE — TELEPHONE ENCOUNTER
If possible, can we please ask the collar if she has time for a brief talk.  I have some questions to ask her regarding Ms Olmedo and her mental status since her clinic visit.  Please ask her for a time in the phone number for me to call her.  Thank you

## 2021-06-03 NOTE — TELEPHONE ENCOUNTER
Left message to call back for: Xiao  Information to relay to patient:  Please relay below message and ask questions.  Thank you.    Trudi ABRAHAM LPN .......... 10:48 AM  11/11/19        Notes recorded by Dustin Phillips MD on 11/11/2019 at 8:39 AM CST  Could you please let home health nurse Xiao know that Topamax level was below detectable.  Really important to ensure that she is receiving her seizure medications as prescribed.  Likely needs to have it rechecked at a nearby clinic in about 1 to 2 weeks after ensuring proper administration of the medication.    How is patients medications set up? Med box? Automatic dispenser?  Is she good about taking her medications that she knows of?

## 2021-06-03 NOTE — TELEPHONE ENCOUNTER
ANTICOAGULATION  MANAGEMENT    Assessment     Today's INR result of 3.50 is Supratherapeutic (goal INR of 2.0-3.0)        Warfarin taken as previously instructed    No new diet changes affecting INR    No new medication/supplements affecting INR    Continues to tolerate warfarin with no reported s/s of bleeding or thromboembolism     Previous INR was Therapeutic at 2.72 on 9/9/19.    Plan:     Spoke with Valentina regarding INR result and instructed:     Warfarin Dosing Instructions:   (evenings. has 5mg tabs)   - reported already taking 7.5mg warfarin dose today,   - advised to HOLD warfarin dose tomorrow, 11/7   - then continue current warfarin dose 7.5 mg daily.    Instructed patient to follow up no later than:  1-2 wks.   - will call PCA and schedule next INR.   - PCA will drive Valentina to her appts.    Education provided: importance of consistent vitamin K intake, impact of vitamin K foods on INR and target INR goal and significance of current INR result    Valentina verbalizes understanding and agrees to warfarin dosing plan.    Instructed to call the [facility] for any changes, questions or concerns. (#[phone])   ?   Shonna Ernandez RN    Subjective/Objective:      Valentina Olmedo, a 59 y.o. female is on warfarin.     Valentina reports:     Home warfarin dose: verbally confirmed home dose with Valentina and updated on anticoagulation calendar     Missed doses: No     Medication changes:  No     S/S of bleeding or thromboembolism:  No     New Injury or illness:  Yes:  Seen today for urinary frequency / dysuria.     Changes in diet or alcohol consumption:  Yes. Not much of an appetite.    Upcoming surgery, procedure or cardioversion:  No    Anticoagulation Episode Summary     Current INR goal:   2.0-3.0   TTR:   66.1 %   Next INR check:   11/20/2019   INR from last check:   3.50! (11/6/2019)   Weekly max warfarin dose:      Target end date:      INR check location:      Preferred lab:      Send INR reminders to:   [facility]  [address]    Indications    Pulmonary embolism with infarction (H) [I26.99]           Comments:            Anticoagulation Care Providers     Provider Role Specialty Phone number    Donald Rooney MD Referring Internal Medicine [phone]

## 2021-06-03 NOTE — TELEPHONE ENCOUNTER
Triage call:    25 mg of topamax is set up for her at bed time  Xiao states that she has no reason to believe that patient is skipping her medications- no medications found in her room. Particular about her medications as she has epilepsy.   Meds set up in Medication box  Good about taking her medications- no missed doses noted by  and no missed doses noted by RN     Home care nurse also has another concern to discuss today-   Valentina has not been getting out of bed- in bed for 4 days- Isolating in her room and has had times of being incoherent and not responding appropriately (per )   Oxygen- level 95% HR 66   Headache pain reported   Alert with delayed responses- knew nurses name (had introduced herself when entering the room)   Would not answer orientation questions when asked her name/birthday etc     Awake and eyes are open per Xiao  Neuro-psych eval will be in December   RN reports that she has been seeing patient for about one year and her cognitive status has continued to decline and now her functional status as well.     Advised that patient should be transported to the hospital via 911- family is hesitant to call 911. Advised that 911 would be the most appropriate to have her evaluated quickly. Home care nurse states that she will encourage family to call 911 today rather than try to drive patient them selves.     Home care nurse requests to have a message sent to Dr Phillips as well as PCP as she had been working with Dr Phillips about medication issues last week.     Melissa Kamara RN BSBA [facility] Connection Triage/Med Refill 11/13/2019 9:29 AM     Reason for Disposition    Difficult to awaken or acting confused (disoriented, slurred speech) and new onset    Protocols used: CONFUSION - DELIRIUM-A-OH

## 2021-06-03 NOTE — TELEPHONE ENCOUNTER
Patient Returning Call  Reason for call:  Xiao  Information relayed to patient:  Caller was informed of Dustin Phillips MD message; Could you please let home health nurse Xiao know that Topamax level was below detectable.  Really important to ensure that she is receiving her seizure medications as prescribed.  Likely needs to have it rechecked at a nearby clinic in about 1 to 2 weeks after ensuring proper administration of the medication.     How is patients medications set up? Med box? Automatic dispenser?  Is she good about taking her medications that she knows of?    Caller stated that patient does have a med pack that she sets up for patient. Patient has had some little increase of confusion of medication before. Caller stated that patient is  not throwing them away like she used to. Caller stated that spouse mention that patient appears to be taking medications better now. Caller also stated that patient hasn't been feeling well and not getting out of bed for x4 days now. Triage was offered and transferred over.   Patient has additional questions:  No  If YES, what are your questions/concerns:  n/a  Okay to leave a detailed message?: No call back needed

## 2021-06-03 NOTE — TELEPHONE ENCOUNTER
Who is calling:  Xiao Mental Health Nurse/Worker  Reason for Call:  Xiao states the patient takes 25 mg of topamax at bedtime only.  Date of last appointment with primary care: N/A  Okay to leave a detailed message: Yes

## 2021-06-03 NOTE — TELEPHONE ENCOUNTER
Dr. Phillips,  Spoke with Xiao and she states that she is available to discuss the patient until noon today.  She can be reached at [phone] and will be expecting your call.  Nissa MALIN CMA/CMT....................8:58 AM

## 2021-06-03 NOTE — TELEPHONE ENCOUNTER
Refill Approved    Rx renewed per Medication Renewal Policy. Medication was last renewed on 10/29/19.    Bhvaana Brito, [facility] Connection Triage/Med Refill 11/21/2019     Requested Prescriptions   Pending Prescriptions Disp Refills     SENNA 8.6 mg tablet [Pharmacy Med Name: SENNA 8.6MG TAB] 120 tablet 0     Sig: TAKE 2 TABLETS (17.2MG) BY MOUTH TWICE A DAY       GI Medications Refill Protocol Passed - 11/20/2019  1:44 PM        Passed - PCP or prescribing provider visit in last 12 or next 3 months.     Last office visit with prescriber/PCP: 7/12/2019 Donald Rooney MD OR same dept: 11/6/2019 Dustin Phillips MD OR same specialty: 11/6/2019 Dustin Phillips MD  Last physical: 9/13/2018 Last MTM visit: Visit date not found   Next visit within 3 mo: Visit date not found  Next physical within 3 mo: Visit date not found  Prescriber OR PCP: Donald Rooney MD  Last diagnosis associated with med order: 1. Other constipation  - SENNA 8.6 mg tablet [Pharmacy Med Name: SENNA 8.6MG TAB]; TAKE 2 TABLETS (17.2MG) BY MOUTH TWICE A DAY  Dispense: 120 tablet; Refill: 0    If protocol passes may refill for 12 months if within 3 months of last provider visit (or a total of 15 months).

## 2021-06-03 NOTE — TELEPHONE ENCOUNTER
I agree that she probably needs a more in-depth evaluation for this acute change in her presentation/symptoms.  Hopefully they will decide to pursue assistance in the emergency department.

## 2021-06-03 NOTE — TELEPHONE ENCOUNTER
ANTICOAGULATION  MANAGEMENT: Discharge Review    Valentina Olmedo chart reviewed for anticoagulation continuity of care    Emergency room visit on  11/13/2019 for altered mental state ongoing for the last 2 wks.   - has been confused, lethargivc, difficulty doing thinks for herself.   - due to non-compliance in taking medications (maybe skipping / or taking more) - medications taken by patient unsupervised.   - it was advised, that patient should not be managing her meds.       Discharge disposition: Home    INR Results:       Recent labs: (last 7 days)     11/13/19  1237   INR 2.50*       Warfarin inpatient management: home regimen continued    Warfarin discharge instructions: home regimen continued     Medication Changes Affecting Anticoagulation: No    Additional Factors Affecting Anticoagulation: Yes: ensure medications are taken.    Plan     No adjustment to anticoagulation plan needed      Patient not contacted    Anticoagulation calendar updated    Shonna Ernandez RN

## 2021-06-03 NOTE — TELEPHONE ENCOUNTER
Left message to call back for: Xiao - Mental Health Nurse/Worker  Information to relay to patient:  Please ask Xiao when patient takes her topamax.    Thank you.    Trudi ABRAHAM LPN .......... 1:33 PM  11/07/19

## 2021-06-03 NOTE — PROGRESS NOTES
"Mental Health Visit Note    11/5/2019    Start time: 11:08am    Stop Time: 11:55am  Session # 6    Session Type: Patient is presenting for an Individual session.   Persons present include patient and therapist, patient's spouse (present for collateral in session and per patient request, and because he provides transport), new PCA Pam.    Valentina Olmedo is a 59 y.o. female is being seen today for   Chief Complaint   Patient presents with      Follow Up     Depression     chronic low mood     Anxiety     anxiety, unable to articulate reasons     Cognitive issues     confusion and thought blocking   .     New symptoms or complaints: Patient shows confusion and thought blocking, not oriented, not able to finish her sentences.    Functional Impairment:   Personal: 3  Family: 3 (no contact with family apart from spouse)  Work: 4  Social: 4  (no social support apart from  and PCA)    Clinical assessment of mental status: Patient's grooming is Well groomed, his attire is Appropriate, and his age appears Older. Behavior towards examiner is Cooperative, motor activity is Within normal and Retarded, and eye contact is Staring.  Patient's mood is Depressed, and affect is Blunted and Flat.  Speech/language is Delayed/Hesitant, attention is Distractible, and concentration is Brief. Thought process is Slow, thought content is Within noraml and  Within normal.  Patient's orientation is X 3, memory is  Impairment and Recent, judgement is Impairment and Minimal, and estimated intelligence is Below Average. Demonstrated insight is Adequate while fund of knowledge is adequate.    Suicidal/Homicidal Ideation present:  Patient denies suicidal ideation, active plan, or intent. Agrees to call 911 or present to ER if she has impulses to harm herself.    Patient's impression of their current status:   \"I thought I was going to be talking about ...\"  Patient unable to finish her sentences.  Unable to report her mood.  Asked if she " "is depressed or anxious, patient states \"Yes, I'm anxious...\" unable to say why/what.  She parrots others statesments \"It's the weather.\"   Asked to share current mental status, she starts out \"For one thing, I don't feel...\"  Asked about today's date, patient unable to respond.  \"I don't know.\"   She is oriented to the year, not month or day.  Asked about pain, she says she has pain but is unable to articulate more.    PCA states that patient has \"not been her normal self\" since weather got cold.  She is not talking.  Eating only one bowl of cereal/day.    Spouse reports sleep is erratic: Awake for hours at a time at night.     Patient unable to reflect on whether she made any efforts to follow through on therapy homework. According to PCA, patient stopped participating in getting outside the apartment or walking a couple weeks ago. Not getting up out of bed much of the time. Did not practice deep breathing for anxiety.    Therapist impression of patients current state: Patient cognitive status has been slow for some time but today she shows acute cognitive slowing and thought blocking. She shows no emotional reaction at all, even when she is discussed by others in the room.  Looks more like dementia rather than depression or dissociation. Etiology unknown (UTI?).  Patient could not care for herself but has  with her 24/7, sees nurse then PCA in the morning. Good medical work up recommended.    Discussed that patient does not feel anxious or depressed as much as confused with slow thinking.  Strongly encouraged spouse and PCA to take patient to the doctor to get checked out, or Urgent Care.  They made the decision to go tomorrow.  Advised patient's spouse that he can call 911 if patient is unable to be safe at home.    Type of psychotherapeutic technique provided: Client centered and Solution-focused      Progress toward short term goals: Poor progress: patient unable to do any therapy homework due to acute " cognitive issues.    Review of long term goals: Not done at today's visit and Date of last review 9/29/2019    Diagnosis:   1. Depression, major, recurrent, moderate (H)    2. Generalized anxiety disorder    3. Borderline personality disorder (H)    4. Chronic post-traumatic stress disorder (PTSD)    5. Cognitive deficits        Plan and Follow up: Patient will return for follow up in four weeks. Patient agrees to visit doctor or go to emergency room for medical evaluation.   If she clears she will attempt same therapy homework:  She will try out some different forms of exercise with the support of her PCA.  She will consider registering for mental health illness and recovery goal setting group.  She will utilize deep breathing and grounding her senses for any acute anxiety.  She will continue efforts to get up and dressed and out of the apartment daily.  She will travel with  or PCA to make excursions into the community at least 2 times per week.    Discharge Criteria/Planning: Client has chronic symptoms and ongoing therapy for maintenance stability recommended.    Loretta Barney MSW, LICSW  11/5/2019

## 2021-06-03 NOTE — PROGRESS NOTES
[facility] Clinic Note  Valentina Olmedo   59 y.o. female    Date of Visit: 11/6/2019  Chief Complaint   Patient presents with     Urinary Frequency     burning with urination X1.5 weeks       Assessment/Plan  1. Urinary frequency  2. Confusion  3. Schizoaffective disorder, depressive type (H)  4. Hypothyroidism due to acquired atrophy of thyroid  5.  Stage III chronic kidney disease  Unsure of etiology. Concerning for delirium/encephalopathy  Differential includes side effects from polypharmacy, hypothyroidism, hyponatremia, uremia, UTI, withdrawal from psychoactive medication, or manifestation of her depressive schizoaffective disorder.  Unlikely related to concussion/TBI, migraine headache, seizure.  Tachycardia and NICHOLAS likely related to deconditioning, rather than infection. This resolved with rest, which I communicated to the  and PCA. Oxybutyinin and trazodone may also play a role.    Will call  with results. Tried to call Xiao from the [facility] program at [phone] regarding medication administration to clarify concerns of polypharmacy.  - Urine,Microscopic  - Urinalysis Macroscopic  - Comprehensive Metabolic Panel  - HM2(CBC w/o Differential)  - Thyroid Stimulating Hormone (TSH)  Labs noted above were for the most part normal except for CKD 3, with creatinine elevated at 1.69 and creatinine clearance of 92 ml/min. Communicated to  and to Xiao that patient is likely dehydrated and needs to stay well-hydrated.  Xiao confirm that patient has had issues with maintaining good hydration.  Also informed her that patient should abstain from any NSAIDs.  Spoke to her home health nurse on 11/8/2019, who noted patient is not taking lorazepam.  CT had from March 2019 was pertinent for encephalomalacia but no acute infarct or mass lesion.  Home health nurse states patient is scheduled to undergo neuropsych testing soon due to cognitive/executive function changes over  "the last 3 to 4 months.  Her carbamazepine level was normal.  Added on for Topamax level today.  Uses a pillbox per home health nurse, and there have been no new medications added to the patient's regimen.  Spoke to Xiao at length regarding need to renally dose medications and likely need to have creatinine checked in the next 1 to 2 months.  5.  History of DVT  Overdue for INR check.  No signs or symptoms of active bleeding.  Checking INR at [facility] on 11/7/2019.    Much or all of the text in this note was generated through the use of Dragon Dictate voice-to-text software. Errors in spelling or words which seem out of context are unintentional. Sound alike errors, in particular, may have escaped editing  Dustin Phillips MD    Return if symptoms worsen or fail to improve.    Subjective  This 59 y.o. old female presents with her spouse Jass and PCA Salvador.  They complain of 1.5 weeks of fatigue, insomnia and chills.  PCA and family endorses \"thought blunting\" and \"disorientation\".  They note this is not her baseline.  Also endorses dysuria with periodic urinary retention, without gross hematuria.  Endorses nonspecific abdominal discomfort without pain, nausea, vomiting, change in stool pattern.  Endorses not drinking enough water and endorses dry mouth and poor p.o. intake in the setting of multiple medications. Endorses taking prescribed medications as instructed.  No constipation and states she always feels cold.  Denies any alcohol intake.  No head trauma or falls, visual or auditory hallucinations.  Endorses posterior occipital discomfort related to a headache.  Denies any recent seizure activity, tearing, photophobia, rhinorrhea or sinus discomfort.  Smokes 1 to 2 cigarettes/day, with associated dry cough.  Endorses NICHOLAS but denies chest pain.  Heart rate 121 after ambulating from the restroom, decreased to 84 after 15 minutes of sitting down.  Seen in the ED on 9/9/2019 for " hyperventilation syndrome and respiratory alkalosis, and appears was prescribed lorazepam 1 mg 3 times daily as needed for anxiety but review from [facility] only 8 tablets were prescribed and prescription was filled on 9/13/2019.  Takes oxybutynin daily 10 mg ER, and endorses taking Synthroid and seizure Rx as prescribed.    ROS A comprehensive review of systems was performed and was otherwise negative    Medications, allergies, and problem list were reviewed and updated    Exam  General appearance: Pleasant, nontoxic-appearing, no acute distress, alert and oriented x4, dry oromucosa  Vitals:    11/06/19 1131   BP: 124/76   Pulse: (!) 121   SpO2: 96%   EYES: Eyelids, conjunctiva, and sclera were normal. Pupils were normal. Cornea, iris, and lens were normal bilaterally.  NECK: Neck appearance was normal.  No thyromegaly. Cervical obesity.    RESPIRATORY: Bilaterally with no crackles, wheezing or rhonchi  CARDIOVASCULAR: Distant, restricted by obesity.  Tachycardic initially but down to 15 minutes later.  Regular S1 and S2.  Radial pulses intact.  Trace pitting edema below the knees bilaterally  GASTROINTESTINAL: NABS, soft, nontender, nondistended, no hepatomegaly  NEUROLOGIC: Alert and oriented to person, and circumstance.  Delayed response with answering month, year and day of the week.  Able to spell WORLD backwards after multiple attempts.  Speech was slow.  Cranial nerves were normal. Motor strength in upper extremities was normal for age. Tries to follow commands  PSYCHIATRIC:  Mood and affect were flat.  Poor eye contact.     Additional Information   Current Outpatient Medications   Medication Sig Dispense Refill     albuterol (PROAIR HFA;PROVENTIL HFA;VENTOLIN HFA) 90 mcg/actuation inhaler Inhale 2 puffs every 4 (four) hours as needed for wheezing. 1 Inhaler 6     atorvastatin (LIPITOR) 20 MG tablet TAKE 1 TABLET BY MOUTH EVERY EVENING FOR CHOLESTEROL 90 tablet 3     carBAMazepine (TEGRETOL) 200 mg tablet Take 200  mg by mouth 3 (three) times a day. 2 tabs (400mg) q AM, 1.5 tabs (300mg) q PM and HS       DULoxetine (CYMBALTA) 60 MG capsule Take 2 capsules (120 mg total) by mouth daily. 180 capsule 1     ethotoin (PEGANONE) 250 mg Tab Take 1,000 mg by mouth 3 (three) times a day.       levothyroxine (SYNTHROID, LEVOTHROID) 200 MCG tablet TAKE 1 TABLET(200 MCG) BY MOUTH DAILY 90 tablet 3     LORazepam (ATIVAN) 1 MG tablet Take 1 tablet (1 mg total) by mouth 3 (three) times a day as needed for anxiety. 8 tablet 0     melatonin 3 mg Tab tablet Take 1 tablet (3 mg total) by mouth at bedtime as needed. 90 tablet 1     omeprazole (PRILOSEC) 20 MG capsule TAKE 1 CAPSULE BY MOUTH DAILY BEFORE BREAKFAST 90 capsule 3     oxybutynin (DITROPAN XL) 10 MG ER tablet TAKE 1 TABLET BY MOUTH EVERY DAY 30 tablet 11     polyethylene glycol (MIRALAX) 17 gram packet Take 17 g by mouth daily as needed.        QUEtiapine (SEROQUEL) 200 MG tablet Take 1 tablet (200 mg total) by mouth at bedtime (generic. Seroquel) 90 tablet 1     risperiDONE (RISPERDAL) 0.5 MG tablet Take 1 tablet (0.5 mg total) by mouth at bedtime. 90 tablet 1     rosuvastatin (CRESTOR) 20 MG tablet Take 1 tablet (20 mg total) by mouth daily. 90 tablet 3     senna (SENOKOT) 8.6 mg tablet Take 2 tablets by mouth 2 (two) times a day. 120 tablet 0     topiramate (TOPAMAX) 25 MG tablet Take 1 tablet (25 mg total) by mouth at bedtime. 90 tablet 1     traZODone (DESYREL) 100 MG tablet Take 2 tablets (200 mg total) by mouth at bedtime. 180 tablet 0     VITAMIN D3 2,000 unit capsule TAKE 1 CAPSULE BY MOUTH DAILY 30 capsule 11     warfarin (COUMADIN/JANTOVEN) 5 MG tablet Take 1 &1/2 tablets (7.5mg) by mouth daily, as directed.  Adjust dose based on INR results. 140 tablet 1     EPINEPHrine 0.15 mg/0.15 mL atIn Inject 1 each into the shoulder, thigh, or buttocks.       nicotine polacrilex (NICORETTE) 2 mg gum Apply 1 each (2 mg total) to the mouth or throat as needed for smoking cessation. 220  each 2     No current facility-administered medications for this visit.      Allergies   Allergen Reactions     Aspirin (Tartrazine Only) Shortness Of Breath     Can have asa as long as no yellow dye     Gabapentin Rash     Guilherme Johnsons rash     Venom-Honey Bee Anaphylaxis     Vistaril [Hydroxyzine Pamoate] Other (See Comments)     Excessive sedation     Yellow Dye Shortness Of Breath     Linked only to tartazine.     Hydroxyzine Other (See Comments)     Sluggish; unable to wake up     Latex Itching     Other Environmental Allergy      Dust, Mold, Pollen     Phenobarbital Other (See Comments)     unknown     Latex Itching     Social History     Patient does not qualify to have social determinant information on file (likely too young).   Social History Narrative    ,  René.  René works at [employer].  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  No children.       Social History     Tobacco Use     Smoking status: Former Smoker     Packs/day: 0.25     Years: 35.00     Pack years: 8.75     Types: Cigarettes     Start date: 6/10/1974     Smokeless tobacco: Never Used     Tobacco comment: smoking 1-2 a day   Substance Use Topics     Alcohol use: No     Drug use: No     Family History   Problem Relation Age of Onset     Lung cancer Maternal Grandfather 76     COPD Mother      Other Father         estranged     Diabetes Sister      Depression Sister      Alcohol abuse Sister      Other Brother         Missing since 1992     Alcohol abuse Brother      No Medical Problems Sister         Half sister

## 2021-06-04 ENCOUNTER — DOCUMENTATION ONLY (OUTPATIENT)
Dept: SLEEP MEDICINE | Facility: CLINIC | Age: 61
End: 2021-06-04

## 2021-06-04 VITALS
WEIGHT: 293 LBS | OXYGEN SATURATION: 97 % | BODY MASS INDEX: 47.09 KG/M2 | HEIGHT: 66 IN | DIASTOLIC BLOOD PRESSURE: 74 MMHG | SYSTOLIC BLOOD PRESSURE: 136 MMHG | HEART RATE: 96 BPM

## 2021-06-04 VITALS — HEIGHT: 66 IN | BODY MASS INDEX: 47.09 KG/M2 | WEIGHT: 293 LBS

## 2021-06-04 DIAGNOSIS — G47.33 OBSTRUCTIVE SLEEP APNEA (ADULT) (PEDIATRIC): Primary | ICD-10-CM

## 2021-06-04 NOTE — TELEPHONE ENCOUNTER
Anticoagulation Management    Unable to reach Valentina and nurse Xiao today.    Today's INR result of 3.8 is Supratherapeutic (goal INR of 2.0-3.0).     Follow up required to discuss out of range INR .    Left message to hold warfarin tonight.       ACN to follow up    Angella Caraballo RN

## 2021-06-04 NOTE — TELEPHONE ENCOUNTER
Spoke with RAJEEV Swan from [facility].     - requested next INR check     - scheduled INR today @ [facility] lab @ 1pm     - Sosa RN would like a call back with current INR and warfarin orders.  ([phone]     - waiting for INR result.

## 2021-06-04 NOTE — TELEPHONE ENCOUNTER
Who is calling:  Sosa  Reason for Call:  She needs INR result and what her current dosing orders are  Date of last appointment with primary care: n/a  Okay to leave a detailed message: Yes

## 2021-06-04 NOTE — TELEPHONE ENCOUNTER
ANTICOAGULATION  MANAGEMENT: Discharge Review    Valentina Olmedo chart reviewed for anticoagulation continuity of care    Hospital admission on  12/10 to 12/2019 for Delirious (Crisis evaluation).   - altered mental status.    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 Rooney.    Anticoagulation calendar updated    Sohnna Ernandez RN

## 2021-06-04 NOTE — TELEPHONE ENCOUNTER
ANTICOAGULATION  MANAGEMENT    Assessment     Today's INR result of 2.20 is Therapeutic (goal INR of 2.0-3.0)        Warfarin recently held as instructed which may be affecting INR    No new diet changes affecting INR    No new medication/supplements affecting INR    Continues to tolerate warfarin with no reported s/s of bleeding or thromboembolism     Previous INR was Supratherapeutic at 3.80 on 12/19/19.    Plan:     Spoke with RAJEEV Swan regarding INR result and instructed:     Warfarin Dosing Instructions:   (has 5mg tabs)   - Continue current warfarin dose 5 mg daily on Saturdays; and 7.5 mg daily rest of week.    Instructed patient to follow up no later than:  2 wks.    Education provided: target INR goal and significance of current INR result, importance of taking warfarin as instructed and importance of notifying clinic for changes in medications    RAJEEV Swan verbalizes understanding and agrees to warfarin dosing plan.    Instructed to call the [facility] for any changes, questions or concerns. (#[phone])   ?   Shonna Ernandez RN    Subjective/Objective:      Valentina ORNELAS Juan Ramonjuan diego, a 59 y.o. female is on warfarin.     Valentina reports:     Home warfarin dose: Valentina has not been coherent lately, per Jass.   RN's sets up Valentina's medication in her pill dispenser.     Missed doses: Yes:  Per RAJEEV Swan, warfarin dose held on 12/19/19, as instructed.   - Jass has been setting up meds and administering meds to his wife.      Medication changes:  No     S/S of bleeding or thromboembolism:  No     New Injury or illness:  No     Changes in diet or alcohol consumption:  No     Upcoming surgery, procedure or cardioversion:  No    Anticoagulation Episode Summary     Current INR goal:   2.0-3.0   TTR:   56.2 % (1 y)   Next INR check:   1/9/2020   INR from last check:   2.20 (12/26/2019)   Weekly max warfarin dose:      Target end date:      INR check location:      Preferred lab:      [facility]ers [facility]:    [facility]    Indications    Pulmonary embolism with infarction (H) [I26.99]           Comments:            Anticoagulation Care Providers     Provider Role Specialty Phone number    Donald Rooney MD Referring Internal Medicine [phone]

## 2021-06-04 NOTE — TELEPHONE ENCOUNTER
ANTICOAGULATION  MANAGEMENT    Assessment     Today's INR result of 3.80 is Supratherapeutic (goal INR of 2.0-3.0)        Warfarin taken as previously instructed    No new diet changes affecting INR    No interaction expected between Zyprexa given in ED on 12/14 and Risperidone stopped and warfarin    Continues to tolerate warfarin with no reported s/s of bleeding or thromboembolism     Previous INR was Subtherapeutic at 1.54 on 12/12/19.    Recent hospitalization from 12/10-12/19 for altered mental state.  Presented in ED again @  for anxiety / panic attack / agitation.    Plan:     Spoke with Valentina regarding INR result and instructed:     Warfarin Dosing Instructions:    - message was left to HOLD warfarin dose on 12/18,  However, Valentina reported she did not check her VM on her cell phone and warfarin 7.5mg dose was taken.   - today - 12/19, advised to HOLD warfarin dose.   - then,  Change warfarin dose to 5 mg daily on Saturdays; and 7.5 mg daily rest of week.   - (4.8 % change)    Instructed patient to follow up no later than: 1-2 wks.    Education provided: target INR goal and significance of current INR result, importance of taking warfarin as instructed, no interaction anticipated between warfarin and Risperidone and Zyprexa and importance of notifying clinic for changes in medications    Valentina verbalizes understanding and agrees to warfarin dosing plan.    Instructed to call the [facility] for any changes, questions or concerns. (#[phone])   ?   Shonna Ernandez RN    Subjective/Objective:      Valentina Olmedo, a 59 y.o. female is on warfarin.     Valentina reports:     Home warfarin dose: as updated on anticoagulation calendar per template     Missed doses: No     Medication changes:  Yes:  Given new RX for Zyprexa on 12/14/19.  Risperidone stopped on 12/12/19.     S/S of bleeding or thromboembolism:  No     New Injury or illness:  Yes:  Presented in ED again on 12/14 for anxiety, panic attacks,  and increased agitation.     Changes in diet or alcohol consumption:  No     Upcoming surgery, procedure or cardioversion:  No    Anticoagulation Episode Summary     Current INR goal:   2.0-3.0   TTR:   56.1 % (1 y)   Next INR check:   1/1/2020   INR from last check:   3.80! (12/18/2019)   Weekly max warfarin dose:      Target end date:      INR check location:      Preferred lab:      Send INR reminders to:   [facility]    Indications    Pulmonary embolism with infarction (H) [I26.99]           Comments:            Anticoagulation Care Providers     Provider Role Specialty Phone number    Donald Rooney MD Referring Internal Medicine [phone]

## 2021-06-04 NOTE — TELEPHONE ENCOUNTER
ANTICOAGULATION  MANAGEMENT PROGRAM    Valentina Olmedo is overdue for INR check.     Left message with Xiao from [facility] ([phone]) to call and schedule INR appointment as soon as possible.   - advised to have Valentina's PCA make appt for INR      Shonna Ernandez RN

## 2021-06-04 NOTE — TELEPHONE ENCOUNTER
Refill Approved    Rx renewed per Medication Renewal Policy. Medication was last renewed on 5/29/19.12/14/18.    Nadeen Stubbs, [facility] Triage/Med Refill 12/14/2019     Requested Prescriptions   Pending Prescriptions Disp Refills     rosuvastatin (CRESTOR) 20 MG tablet [Pharmacy Med Name: ROSUVASTATIN 20MG TAB] 28 tablet      Sig: TAKE 1 TABLET BY MOUTH DAILY       Statins Refill Protocol (Hmg CoA Reductase Inhibitors) Passed - 12/11/2019  4:14 PM        Passed - PCP or prescribing provider visit in past 12 months      Last office visit with prescriber/PCP: 7/12/2019 Donald Rooney MD OR same dept: 11/6/2019 Dustin Phillips MD OR same specialty: 11/6/2019 Dustin Phillips MD  Last physical: 9/13/2018 Last MTM visit: Visit date not found   Next visit within 3 mo: Visit date not found  Next physical within 3 mo: Visit date not found  Prescriber OR PCP: Donald Rooney MD  Last diagnosis associated with med order: 1. Hyperparathyroidism, s/p resection adenoma  - levothyroxine (SYNTHROID, LEVOTHROID) 200 MCG tablet [Pharmacy Med Name: LEVOTHYROXINE 200MCG TAB]; TAKE 1 TABLET BY MOUTH DAILY  Dispense: 28 tablet    If protocol passes may refill for 12 months if within 3 months of last provider visit (or a total of 15 months).             levothyroxine (SYNTHROID, LEVOTHROID) 200 MCG tablet [Pharmacy Med Name: LEVOTHYROXINE 200MCG TAB] 28 tablet      Sig: TAKE 1 TABLET BY MOUTH DAILY       Thyroid Hormones Protocol Passed - 12/11/2019  4:14 PM        Passed - Provider visit in past 12 months or next 3 months     Last office visit with prescriber/PCP: 7/12/2019 Donald Rooney MD OR same dept: 11/6/2019 Dustin Phillips MD OR same specialty: 11/6/2019 Dustin Phillips MD  Last physical: 9/13/2018 Last MTM visit: Visit date not found   Next visit within 3 mo: Visit date not found  Next physical within 3 mo: Visit date not found  Prescriber OR PCP: Donald  Cr Rooney MD  Last diagnosis associated with med order: 1. Hyperparathyroidism, s/p resection adenoma  - levothyroxine (SYNTHROID, LEVOTHROID) 200 MCG tablet [Pharmacy Med Name: LEVOTHYROXINE 200MCG TAB]; TAKE 1 TABLET BY MOUTH DAILY  Dispense: 28 tablet    If protocol passes may refill for 12 months if within 3 months of last provider visit (or a total of 15 months).             Passed - TSH on file in past 12 months for patient age 12 & older     TSH   Date Value Ref Range Status   12/10/2019 1.94 0.30 - 5.00 uIU/mL Final

## 2021-06-04 NOTE — PROGRESS NOTES
Office Visit - Follow Up   Valentina Olmedo   59 y.o. female    Date of Visit: 12/18/2019    Chief Complaint   Patient presents with     Hospital Visit Follow Up        Assessment and Plan   1. Nonintractable epilepsy without status epilepticus, unspecified epilepsy type (H)  Continue same medications reviewed EEG, stable follow-up with epileptic allergy per routine    2. Major depressive disorder, recurrent episode, moderate (H)  Seems fairly stable, risperidone stopped probably helping continue same medications follow-up with psychiatry    3. Anxiety disorder, unspecified type  Above    4. Nicotine dependence, cigarettes, with other nicotine-induced disorders  [place] approved smoking cessation modalities discussed with the patient    5. Pulmonary embolism with infarction (H)  Continue warfarin for VTE prophylaxis  - INR    6. Essential hypertension with goal blood pressure less than 140/90  Pressure looks okay continue current medication    7. Stage 3 chronic kidney disease (H)  Kidney function has been stable    8. Morbid obesity with BMI of 45.0-49.9, adult (H)  The following high BMI interventions were performed this visit: encouragement to exercise and lifestyle education regarding diet    Return in about 4 weeks (around 1/15/2020) for recheck.     History of Present Illness   This 59 y.o. old woman comes in for post hospital follow-up.  She is admitted with encephalopathy.  She was seen in consultation by psychiatry and neurology.  We reviewed these consultations.  She had brain imaging which showed no acute findings no seizure activity.  Risperidone was.  Her course was 1 of general improvement.  She had a urine analysis which was inserting for infection but also appeared contaminated and her urine culture did not grow any bacteria.  She was not treated with antibiotics.  She has done well since discharge home.  She is looking forward to [place].  She continues to smoke.    Review of Systems: A comprehensive  "review of systems was negative except as noted.     Medications, Allergies and Problem List   Reviewed, reconciled and updated  Post Discharge Medication Reconciliation Status: discharge medications reconciled, continue medications without change     Physical Exam   General Appearance:   No acute distress flat affect    /74 (Patient Site: Right Arm, Patient Position: Sitting, Cuff Size: Adult Regular)   Pulse 96   Ht 5' 6\" (1.676 m)   Wt (!) 341 lb (154.7 kg)   SpO2 97%   BMI 55.04 kg/m      HEENT exam is unremarkable  Neck supple no thyromegaly or nodule palpable  Lymphatic no cervical lymphadenopathy  Cardiovascular regular rate and rhythm no murmur gallop or rub  Pulmonary lungs are clear to auscultation bilaterally  Gastrointestinal abdomen obese, soft nontender nondistended no organomegaly  Neurologic exam is non focal  Psychiatric pleasant, no confusion or agitation        Additional Information   Current Outpatient Medications   Medication Sig Dispense Refill     albuterol (PROAIR HFA;PROVENTIL HFA;VENTOLIN HFA) 90 mcg/actuation inhaler Inhale 2 puffs every 4 (four) hours as needed for wheezing. 1 Inhaler 6     carBAMazepine (TEGRETOL) 200 mg tablet Take 300-400 mg by mouth see administration instructions. 2 tabs (400mg) q AM, 1.5 tabs (300mg) q PM and HS        DULoxetine (CYMBALTA) 60 MG capsule Take 2 capsules (120 mg total) by mouth daily. 180 capsule 1     EPINEPHrine 0.15 mg/0.15 mL atIn Inject 1 each into the shoulder, thigh, or buttocks.       ethotoin (PEGANONE) 250 mg Tab Take 1,000 mg by mouth 3 (three) times a day.       levothyroxine (SYNTHROID, LEVOTHROID) 200 MCG tablet TAKE 1 TABLET BY MOUTH DAILY 28 tablet 11     melatonin 3 mg Tab tablet Take 1 tablet (3 mg total) by mouth at bedtime as needed. 90 tablet 1     omeprazole (PRILOSEC) 20 MG capsule TAKE 1 CAPSULE BY MOUTH DAILY BEFORE BREAKFAST 90 capsule 3     oxybutynin (DITROPAN XL) 10 MG ER tablet TAKE 1 TABLET BY MOUTH EVERY DAY " 30 tablet 11     QUEtiapine (SEROQUEL) 200 MG tablet Take 1 tablet (200 mg total) by mouth at bedtime (generic. Seroquel) 90 tablet 1     rosuvastatin (CRESTOR) 20 MG tablet TAKE 1 TABLET BY MOUTH DAILY 28 tablet 11     SENNA 8.6 mg tablet TAKE 2 TABLETS (17.2MG) BY MOUTH TWICE A  tablet 3     topiramate (TOPAMAX) 25 MG tablet Take 1 tablet (25 mg total) by mouth at bedtime. 90 tablet 1     traZODone (DESYREL) 100 MG tablet Take 2 tablets (200 mg total) by mouth at bedtime. 60 tablet 2     VITAMIN D3 2,000 unit capsule TAKE 1 CAPSULE BY MOUTH DAILY 30 capsule 11     warfarin (COUMADIN/JANTOVEN) 5 MG tablet Take 1 &1/2 tablets (7.5mg) by mouth daily, as directed.  Adjust dose based on INR results. (Patient taking differently: Take 7.5 mg by mouth See Admin Instructions. 7.5 mg daily.) 140 tablet 1     No current facility-administered medications for this visit.      Allergies   Allergen Reactions     Aspirin (Tartrazine Only) Shortness Of Breath     Can have asa as long as no yellow dye     Gabapentin Rash     Guilherme Johnsons rash     Venom-Honey Bee Anaphylaxis     Vistaril [Hydroxyzine Pamoate] Other (See Comments)     Excessive sedation     Yellow Dye Shortness Of Breath     Linked only to tartazine.     Hydroxyzine Other (See Comments)     Sluggish; unable to wake up     Latex Itching     Other Environmental Allergy      Dust, Mold, Pollen     Phenobarbital Other (See Comments)     unknown     Latex Itching     Social History     Tobacco Use     Smoking status: Former Smoker     Packs/day: 0.25     Years: 35.00     Pack years: 8.75     Types: Cigarettes     Start date: 6/10/1974     Smokeless tobacco: Never Used     Tobacco comment: smoking 1-2 a day   Substance Use Topics     Alcohol use: No     Drug use: No       Review and/or order of clinical lab tests: Reviewed  Review and/or order of radiology tests: Reviewed  Review and/or order of medicine tests: Reviewed  Discussion of test results with performing  physician:  Decision to obtain old records and/or obtain history from someone other than the patient:  Review and summarization of old records and/or obtaining history from someone other than the patient and.or discussion of case with another health care provider: I reviewed her history and physical, discharge summary consultative notes all summarized above  Independent visualization of image, tracing or specimen itself:    Time:      Donald Rooney MD

## 2021-06-04 NOTE — TELEPHONE ENCOUNTER
VEENA for Xiao Ruiz (patients home health RN) to fax over a copy of patients most recent med list per  request.

## 2021-06-04 NOTE — PROGRESS NOTES
Patient came to [facility] for mask fitting appointment on June 4, 2021. Patient requested to switch masks due to nasal congestion. Discussed the following masks: Airfit F20 and Vitera. Patient selected a Resmed Mask name: Airfit F20 Full Face mask size Medium

## 2021-06-05 ENCOUNTER — RECORDS - HEALTHEAST (OUTPATIENT)
Dept: CARDIOLOGY | Facility: CLINIC | Age: 61
End: 2021-06-05

## 2021-06-05 DIAGNOSIS — R06.02 SHORTNESS OF BREATH: ICD-10-CM

## 2021-06-05 NOTE — TELEPHONE ENCOUNTER
Referral Request  Type of referral: Spine  Who s requesting: Patient  Why the request: back pain  Have you been seen for this request: No:  Appointment Offered:  yes  Does patient have a preference on a group/provider? No  Okay to leave a detailed message?  Yes  [phone]

## 2021-06-05 NOTE — TELEPHONE ENCOUNTER
ANTICOAGULATION  MANAGEMENT: Discharge Review    Valentina Olmedo chart reviewed for anticoagulation continuity of care    Emergency room visit on  1/21/2020 for back pain.   -     Discharge disposition: Home    INR Results:       Recent labs: (last 7 days)     01/16/20  1401   INR 2.10*       Warfarin inpatient management: home regimen continued    Warfarin discharge instructions: home regimen continued     Medication Changes Affecting Anticoagulation: Yes: Oxycodone-Acetaminophen 5/325mg one tab q4hrs, PRN.    Additional Factors Affecting Anticoagulation: No    Plan     No adjustment to anticoagulation plan needed      Patient not contacted    Anticoagulation calendar updated    Shonna Ernandez RN

## 2021-06-05 NOTE — PROGRESS NOTES
Correct pharmacy verified with patient and confirmed in snapshot? [x] yes []no    Charge captured ? [x] yes  [] no    Medications Phoned  to Pharmacy [] yes [x]no  Name of Pharmacist:  List Medications, including dose, quantity and instructions      Medication Prescriptions given to patient   [] yes  [x] no   List the name of the drug the prescription was written for.       Medications ordered this visit were e-scribed.  Verified by order class [x] yes  [] no    Medication changes or discontinuations were communicated to patient's pharmacy: [] yes  [x] no    UA collected [] yes  [x] no    [place] Prescription Monitoring Program Reviewed? [x] yes  [] no    Referrals were made to:  none    Future appointment was made: [x] yes  [] no    Dictation completed at time of chart check: [] yes  [x] no    I have checked the documentation for today s encounters and the above information has been reviewed and completed.

## 2021-06-05 NOTE — TELEPHONE ENCOUNTER
Medication Request  Medication name: oxycodone-acetaminophen   Requested Pharmacy: [facility]  Reason for request: Patient stated Donald Rooney MD sent her to [facility] on 1/21/20 and she was given the pain medication there. Patient stated she is out and needs a refill.  When did you use medication last?:  Last night  Patient offered appointment:  yes  Okay to leave a detailed message: yes [phone]

## 2021-06-05 NOTE — TELEPHONE ENCOUNTER
ANTICOAGULATION  MANAGEMENT    Assessment     Today's INR result of 2.10 is Therapeutic (goal INR of 2.0-3.0)        Warfarin taken as previously instructed    No new diet changes affecting INR    No interaction expected between Ativan / Benadryl and warfarin    Potential interaction between restarted Cymbalta and warfarin which may affect subsequent INRs    - stopped Ativan and Benadryl, due to it can cause delirium.    Continues to tolerate warfarin with no reported s/s of bleeding or thromboembolism     Previous INR was Subtherapeutic at 1.90 on 1/13/2020.    Recent hospitalization for altered mental state from 1/6-13/20 @ [facility].    Plan:     Spoke with Valentina regarding INR result and instructed:     Warfarin Dosing Instructions:    - Continue current warfarin dose 5 mg daily on Friday; and 7.5 mg daily rest of week.    Instructed patient to follow up no later than:  One wk.   - scheduled on 1/21/20 during f/u visit with Dr. Rooney.    Education provided: target INR goal and significance of current INR result, potential interaction between warfarin and Cymbalta, no interaction anticipated between warfarin and Ativan / Benadryl and importance of notifying clinic for changes in medications    Valentina verbalizes understanding and agrees to warfarin dosing plan.    Instructed to call the [facility] for any changes, questions or concerns. (#[phone])   ?   Shonna Ernandez RN    Subjective/Objective:      Valentina ORNELAS Juan Ramonjuan diego, a 59 y.o. female is on warfarin.     Valentina reports:     Home warfarin dose: as updated on anticoagulation calendar per template     Missed doses: No     Medication changes:  Yes:  cymbalta restarted.  Stopped Ativan and Benadryl.     S/S of bleeding or thromboembolism:  No     New Injury or illness:  No     Changes in diet or alcohol consumption:  No     Upcoming surgery, procedure or cardioversion:  No    Anticoagulation Episode Summary     Current INR goal:   2.0-3.0   TTR:   54.2 % (1 y)   Next INR  check:   1/30/2020   INR from last check:   2.10 (1/16/2020)   Weekly max warfarin dose:      Target end date:      INR check location:      Preferred lab:      Send INR reminders to:   [facility]    Indications    Pulmonary embolism with infarction (H) [I26.99]           Comments:            Anticoagulation Care Providers     Provider Role Specialty Phone number    Donald Rooney MD Referring Internal Medicine [phone]
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
## 2021-06-05 NOTE — TELEPHONE ENCOUNTER
Refill Approved    Rx renewed per Medication Renewal Policy. Medication was last renewed on 4/16/19.    Nadeen Stubbs, [facility] Triage/Med Refill 2/5/2020     Requested Prescriptions   Pending Prescriptions Disp Refills     omeprazole (PRILOSEC) 20 MG capsule [Pharmacy Med Name: OMEPRAZOLE 20MG CAP] 28 capsule 0     Sig: TAKE 1 CAPSULE BY MOUTH DAILY BEFORE BREAKFAST       GI Medications Refill Protocol Passed - 2/5/2020  4:01 PM        Passed - PCP or prescribing provider visit in last 12 or next 3 months.     Last office visit with prescriber/PCP: 1/21/2020 Donald Rooney MD OR same dept: 1/29/2020 Donald Garcia MD OR same specialty: 1/29/2020 Donald Garcia MD  Last physical: 9/13/2018 Last MTM visit: Visit date not found   Next visit within 3 mo: Visit date not found  Next physical within 3 mo: Visit date not found  Prescriber OR PCP: Donald Rooney MD  Last diagnosis associated with med order: 1. Gastroesophageal reflux disease without esophagitis  - omeprazole (PRILOSEC) 20 MG capsule [Pharmacy Med Name: OMEPRAZOLE 20MG CAP]; TAKE 1 CAPSULE BY MOUTH DAILY BEFORE BREAKFAST  Dispense: 28 capsule; Refill: 0    2. OAB (overactive bladder)  - oxybutynin (DITROPAN XL) 10 MG ER tablet [Pharmacy Med Name: OXYBUTYNIN 10MG ER TAB]; TAKE 1 TABLET BY MOUTH DAILY  Dispense: 28 tablet; Refill: 0    If protocol passes may refill for 12 months if within 3 months of last provider visit (or a total of 15 months).             oxybutynin (DITROPAN XL) 10 MG ER tablet [Pharmacy Med Name: OXYBUTYNIN 10MG ER TAB] 28 tablet 0     Sig: TAKE 1 TABLET BY MOUTH DAILY       Urinary Incontinence Medications Refill Protocol Passed - 2/5/2020  4:01 PM        Passed - PCP or prescribing provider visit in past 12 months       Last office visit with prescriber/PCP: 1/21/2020 Donald Rooney MD OR same dept: 1/29/2020 Donald Garcia MD OR same specialty: 1/29/2020 Donald Garcia MD  Last physical: 9/13/2018  Last MTM visit: Visit date not found   Next visit within 3 mo: Visit date not found  Next physical within 3 mo: Visit date not found  Prescriber OR PCP: Donald Rooney MD  Last diagnosis associated with med order: 1. Gastroesophageal reflux disease without esophagitis  - omeprazole (PRILOSEC) 20 MG capsule [Pharmacy Med Name: OMEPRAZOLE 20MG CAP]; TAKE 1 CAPSULE BY MOUTH DAILY BEFORE BREAKFAST  Dispense: 28 capsule; Refill: 0    2. OAB (overactive bladder)  - oxybutynin (DITROPAN XL) 10 MG ER tablet [Pharmacy Med Name: OXYBUTYNIN 10MG ER TAB]; TAKE 1 TABLET BY MOUTH DAILY  Dispense: 28 tablet; Refill: 0    If protocol passes may refill for 12 months if within 3 months of last provider visit (or a total of 15 months).

## 2021-06-05 NOTE — TELEPHONE ENCOUNTER
RECEIVED PAPERWORK FOR [facility] FOR A 60-DAY RECERTIFICATION AND PLAN OF TREATMENT AND 60-DAY NURSING SUMMARY - PUT IN PROVIDER'S CLINIC MAILBOX

## 2021-06-05 NOTE — TELEPHONE ENCOUNTER
Carla Gutierrez for refill requested?  Patient was given #8 for 1 day by Dr. Willie Khan.  Please advise.  Thank you.  Nissa MALIN, EDWARD/MARIA LUISA....................11:45 AM

## 2021-06-05 NOTE — PROGRESS NOTES
Office Visit - Follow Up   Valentina Olmedo   59 y.o. female    Date of Visit: 1/21/2020    Chief Complaint   Patient presents with     Hospital Visit Follow Up        Assessment and Plan   1. Acute bilateral low back pain without sciatica  This is a patient with acute onset of severe low back pain and inability to walk.  History is limited because she is screaming in pain.  Exam is limited because of pain as well as her obesity and other challenges.  Given the severity I have recommended urgent evaluation in the emergency room.    2. Schizoaffective disorder, depressive type (H)  Recent hospitalization    3. 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 Pulmonary embolism with infarction (H)  On anticoagulation    5. Nicotine dependence, cigarettes, with other nicotine-induced disorders  Continues to smoke    6. Chronic obstructive pulmonary disease, unspecified COPD type (H)  Continue inhaler, smoking cessation ongoing discussion    7. Nonintractable epilepsy without status epilepticus, unspecified epilepsy type (H)  Continue seizure medication    8. Adrenal mass (H)  Noted status post resection    9. Malignant neoplasm of thyroid gland (H)  Continue levothyroxine    10. Stage 3 chronic kidney disease (H)  Labs reviewed stable    11. Morbid obesity with BMI of 45.0-49.9, adult (H)  She would benefit from weight loss      Return in about 4 weeks (around 2/18/2020) for recheck.     History of Present Illness   This 59 y.o. old woman comes in for post hospital follow-up.  She has been having severe pain in her back it sounds like since she left the hospital.  She was admitted with acute mental illness.  She reports the pain is in her low back and she is unable to walk.  She has severe pain in her legs.  History is otherwise fairly limited because she is screaming.    Review of Systems: A comprehensive review of systems was negative except as noted.     Medications, Allergies and Problem List   Reviewed, reconciled and  "updated  Post Discharge Medication Reconciliation Status:      Physical Exam   General Appearance:   Patient is in acute distress from pain    /74 (Patient Site: Right Arm, Patient Position: Sitting, Cuff Size: Adult Regular)   Pulse (!) 110   Ht 5' 6\" (1.676 m)   Wt (!) 341 lb (154.7 kg)   SpO2 97%   BMI 55.04 kg/m      She will not sit forward for me to examine her back.  Any movement of her legs causes excruciating pain.  She is unable to walk.  She does not appear to be hallucinating.     Additional Information   Current Outpatient Medications   Medication Sig Dispense Refill     albuterol (PROAIR HFA;PROVENTIL HFA;VENTOLIN HFA) 90 mcg/actuation inhaler Inhale 2 puffs every 4 (four) hours as needed for wheezing. 1 Inhaler 6     carBAMazepine (TEGRETOL) 200 mg tablet Take 300-400 mg by mouth see administration instructions. 2 tabs (400mg) q AM, 1.5 tabs (300mg) q PM and HS        DULoxetine (CYMBALTA) 60 MG capsule Take 2 capsules (120 mg total) by mouth daily. 180 capsule 1     EPINEPHrine 0.15 mg/0.15 mL atIn Inject 1 each into the shoulder, thigh, or buttocks.       ethotoin (PEGANONE) 250 mg Tab Take 1,000 mg by mouth 3 (three) times a day.       levothyroxine (SYNTHROID, LEVOTHROID) 200 MCG tablet TAKE 1 TABLET BY MOUTH DAILY 28 tablet 11     melatonin 3 mg Tab tablet Take 1 tablet (3 mg total) by mouth at bedtime as needed. 90 tablet 1     omeprazole (PRILOSEC) 20 MG capsule TAKE 1 CAPSULE BY MOUTH DAILY BEFORE BREAKFAST 90 capsule 3     oxybutynin (DITROPAN XL) 10 MG ER tablet TAKE 1 TABLET BY MOUTH EVERY DAY 30 tablet 11     QUEtiapine (SEROQUEL) 200 MG tablet Take 1 tablet (200 mg total) by mouth at bedtime (generic. Seroquel) 90 tablet 1     rosuvastatin (CRESTOR) 20 MG tablet TAKE 1 TABLET BY MOUTH DAILY 28 tablet 11     SENNA 8.6 mg tablet TAKE 2 TABLETS (17.2MG) BY MOUTH TWICE A  tablet 3     topiramate (TOPAMAX) 25 MG tablet Take 1 tablet (25 mg total) by mouth at bedtime. 90 " tablet 1     traZODone (DESYREL) 100 MG tablet Take 2 tablets (200 mg total) by mouth at bedtime. 60 tablet 2     VITAMIN D3 2,000 unit capsule TAKE 1 CAPSULE BY MOUTH DAILY 30 capsule 11     warfarin (COUMADIN/JANTOVEN) 5 MG tablet Take 1 &1/2 tablets (7.5mg) by mouth daily, as directed.  Adjust dose based on INR results. (Patient taking differently: Take 7.5 mg by mouth See Admin Instructions. 7.5 mg daily.) 140 tablet 1     No current facility-administered medications for this visit.      Allergies   Allergen Reactions     Aspirin (Tartrazine Only) Shortness Of Breath     Can have asa as long as no yellow dye     Gabapentin Rash     Guilherme Johnsons rash     Venom-Honey Bee Anaphylaxis     Vistaril [Hydroxyzine Pamoate] Other (See Comments)     Excessive sedation     Yellow Dye Shortness Of Breath     Linked only to tartazine.     Hydroxyzine Other (See Comments)     Sluggish; unable to wake up     Latex Itching     Other Environmental Allergy      Dust, Mold, Pollen     Phenobarbital Other (See Comments)     unknown     Latex Itching     Social History     Tobacco Use     Smoking status: Former Smoker     Packs/day: 0.25     Years: 35.00     Pack years: 8.75     Types: Cigarettes     Start date: 6/10/1974     Smokeless tobacco: Never Used     Tobacco comment: smoking 1-2 a day   Substance Use Topics     Alcohol use: No     Drug use: No       Review and/or order of clinical lab tests: Reviewed  Review and/or order of radiology tests: Viewed  Review and/or order of medicine tests:  Discussion of test results with performing physician:  Decision to obtain old records and/or obtain history from someone other than the patient:  Review and summarization of old records and/or obtaining history from someone other than the patient and.or discussion of case with another health care provider: I reviewed hospital records from [facility], summarized above  Independent visualization of image, tracing or specimen  itself:    Time:      Donald Rooney MD

## 2021-06-05 NOTE — TELEPHONE ENCOUNTER
Spine referral enter in Epic to be co-sign. Specialty schedulers please contact patient regarding appt, thank you

## 2021-06-05 NOTE — TELEPHONE ENCOUNTER
"Given to Jany Willoughby MA to fax.    Included discharge med list from recent hospitalization.    [facility] Summary excerpt below:  DATE OF ADMISSION: 01/06/2020  DATE OF DISCHARGE: 01/13/2020     \"CHIEF COMPLAINT/REASON FOR ADMISSION: The patient was brought into the  emergency room because of altered mental status, was yelling and disorganized,  and required Versed IM en route to the ED because she was combative with the  medics. She was admitted to Psychiatry as there were no physical health  problems identified for a cause of delirium. She has a history of  schizoaffective disorder as well as a seizure disorder stemming from a stroke  when she was 18 months old, and 3 temporal lobectomy surgeries since then.  She has cognitive limitations stemming from this.    DISCHARGE DIAGNOSES: Axis I:  1. Schizoaffective disorder, bipolar type.  2. Delirium, resolved.  3. Post-traumatic stress disorder by history.  4. 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  Axis II: Deferred.  Axis III: Hypertension, hypothyroidism, atrial fibrillation, history of  pulmonary embolism managed on chronic warfarin, stage 3 kidney disease, and  seizure disorder managed by a neurologist with several antiepileptic drugs.  Axis IV: Stressors include likely problems with medication compliance,  despite Home Health nurse doing medication set-up weekly, and PCA who is  suppose to assist her with medications. Strengths include supportive ,  although his degree of medical sophistication seems limited\".    "

## 2021-06-05 NOTE — TELEPHONE ENCOUNTER
ANTICOAGULATION  MANAGEMENT PROGRAM    Valentina Olmedo is overdue for INR check.     recent hospitalization @ [facility] - INR on 1/13/2020 was 1.90      Shonna Ernandez RN

## 2021-06-05 NOTE — PROGRESS NOTES
ASSESSMENT: Valentina Olmedo is a 59 y.o. 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  Question acute compression fracture.  No current radicular symptoms however.    -  Chronic nonspecific left leg numbness and tingling.  -Significant abdominal obesity.    Patient is neurologically intact on exam.  Patient is wheelchair bound at baseline however with typical activity with transfers only.  PCA was here during the visit as well today.    BANDAR: 84%    WHO 5: 5     Diagnoses and all orders for this visit:    Acute bilateral low back pain without sciatica  -     MR Lumbar Spine Without Contrast; Future; Expected date: 01/30/2020  -     XR Thoracic Spine 3 VWS; Future; Expected date: 01/30/2020  -     MR Lumbar Spine Without Contrast; Standing  -     MR Lumbar Spine Without Contrast  -     lidocaine (LIDODERM) 5 %; Place 1 patch on the skin daily for 14 days. Remove & Discard patch within 12 hours or as directed by MD  Dispense: 14 patch; Refill: 1    History of vertebral compression fracture  -     MR Lumbar Spine Without Contrast; Future; Expected date: 01/30/2020  -     XR Thoracic Spine 3 VWS; Future; Expected date: 01/30/2020  -     MR Lumbar Spine Without Contrast; Standing  -     MR Lumbar Spine Without Contrast  -     lidocaine (LIDODERM) 5 %; Place 1  patch on the skin daily for 14 days. Remove & Discard patch within 12 hours or as directed by MD  Dispense: 14 patch; Refill: 1    Acute midline thoracic back pain  -     MR Lumbar Spine Without Contrast; Future; Expected date: 01/30/2020  -     XR Thoracic Spine 3 VWS; Future; Expected date: 01/30/2020  -     MR Lumbar Spine Without Contrast; Standing  -     MR Lumbar Spine Without Contrast    Numbness and tingling of left lower extremity      PLAN:  Reviewed spine anatomy and disease process. Discussed diagnosis and treatment options with the patient today. A shared decision making model was used.  The patient's values and choices were respected. The following represents what was discussed and decided upon by the provider and the patient.      -DIAGNOSTIC TESTS:  Images were personally reviewed and interpreted and explained to patient today using spine model.   --Ordered lumbar spine MRI ([facility] lying open sided due to claustrophobia)as well as thoracic x-ray to further evaluate acute pain, question acute compression fracture.  --Lumbar spine MRI from 2016 did show acute L1 compression fracture at that time.  Otherwise moderate facet arthropathy L4-5 and L5-S1 with mild degenerative changes.    -PHYSICAL THERAPY: Patient currently unlikely to tolerate physical therapy and is homebound.  Discussed the importance of core strengthening, ROM, stretching exercises with the patient and how each of these entities is important in decreasing pain.  Explained to the patient that the purpose of physical therapy is to teach the patient a home exercise program.  These exercises need to be performed every day in order to decrease pain and prevent future occurrences of pain.        -MEDICATIONS: Did prescribe lidocaine patch today for acute low back pain, advised patient to utilize 12 hours on and 12 hours off.  -Otherwise she can continue Percocet as needed for severe breakthrough pain but she does not like to take  this medication.  Discussed multiple medication options today with patient. Discussed risks, side effects, and proper use of medications. Patient verbalized understanding.    -PATIENT EDUCATION:  45 minutes of total visit time was spent face to face with the patient today, greater than 50% of total time spent with patient was spent on counseling, education, and coordinating care.   -10 minutes spent outside of visit time, non-face-to-face time, reviewing chart.    -FOLLOW-UP:   Did discuss with patient that we can call her with the results of the x-ray and MRI given it is difficult for her to get into clinic.    Advised patient to call the [facility] if symptoms worsen or you have problems controlling bladder and bowel function.   ______________________________________________________________________    SUBJECTIVE:  HPI:  Valentina Olmedo  Is a 59 y.o. female who presents today for new patient evaluation of low back pain that is chronic in nature however typically very intermittent and tolerable.  Pain is been severe and debilitating for the last 2 weeks with no known injury that she describes as a 30/10 constant type pain with significant tenderness to palpation over her lower lumbar spine region as well as thoracic region, however her pain most significantly localizes to the lumbosacral junction.  Patient does report some pain into the posterior buttock and posterior thigh however otherwise denies any lower extremity pain majority of her pain is in her back.  Patient denies new numbness or tingling but she does report that she has had chronic numbness and tingling in her left leg nonspecific, denies any bowel or bladder loss control.    Patient's  as well as PCA was present today during entire visit and added to past medical/surgical/family/social history and history of presenting illness.      *Patient has been into the hospital for the last couple of months for altered mental status and  delirium.    -Treatment to Date: No prior spinal surgery or spinal injection.  No prior physical therapy for back pain.    -Medications:  Percocet prescribed by PCP however patient prefers not to take this medication.  Tylenol with no benefit.    Current Outpatient Medications on File Prior to Encounter   Medication Sig Dispense Refill     albuterol (PROAIR HFA;PROVENTIL HFA;VENTOLIN HFA) 90 mcg/actuation inhaler Inhale 2 puffs every 4 (four) hours as needed for wheezing. 1 Inhaler 6     carBAMazepine (TEGRETOL) 200 mg tablet Take 300-400 mg by mouth see administration instructions. 2 tabs (400mg) q AM, 1.5 tabs (300mg) q PM and HS        DULoxetine (CYMBALTA) 60 MG capsule Take 2 capsules (120 mg total) by mouth daily. 180 capsule 1     EPINEPHrine 0.15 mg/0.15 mL atIn Inject 1 each into the shoulder, thigh, or buttocks.       ethotoin (PEGANONE) 250 mg Tab Take 1,000 mg by mouth 3 (three) times a day.       levothyroxine (SYNTHROID, LEVOTHROID) 200 MCG tablet TAKE 1 TABLET BY MOUTH DAILY 28 tablet 11     melatonin 3 mg Tab tablet Take 1 tablet (3 mg total) by mouth at bedtime as needed. 90 tablet 1     omeprazole (PRILOSEC) 20 MG capsule TAKE 1 CAPSULE BY MOUTH DAILY BEFORE BREAKFAST 90 capsule 3     oxybutynin (DITROPAN XL) 10 MG ER tablet TAKE 1 TABLET BY MOUTH EVERY DAY 30 tablet 11     oxyCODONE-acetaminophen (PERCOCET/ENDOCET) 5-325 mg per tablet Take 1 tablet by mouth every 4 (four) hours as needed for pain. 8 tablet 0     QUEtiapine (SEROQUEL) 200 MG tablet Take 1 tablet (200 mg total) by mouth at bedtime (generic. Seroquel) 90 tablet 1     rosuvastatin (CRESTOR) 20 MG tablet TAKE 1 TABLET BY MOUTH DAILY 28 tablet 11     SENNA 8.6 mg tablet TAKE 2 TABLETS (17.2MG) BY MOUTH TWICE A  tablet 3     topiramate (TOPAMAX) 25 MG tablet Take 1 tablet (25 mg total) by mouth at bedtime. 90 tablet 1     VITAMIN D3 2,000 unit capsule TAKE 1 CAPSULE BY MOUTH DAILY 30 capsule 11     warfarin (COUMADIN/JANTOVEN) 5  MG tablet Take 1 &1/2 tablets (7.5mg) by mouth daily, as directed.  Adjust dose based on INR results. (Patient taking differently: Take 7.5 mg by mouth See Admin Instructions. 7.5 mg daily.) 140 tablet 1     No current facility-administered medications on file prior to encounter.        Allergies   Allergen Reactions     Aspirin (Tartrazine Only) Shortness Of Breath     Can have asa as long as no yellow dye     Gabapentin Rash     Guilherme Johnsons rash     Venom-Honey Bee Anaphylaxis     Vistaril [Hydroxyzine Pamoate] Other (See Comments)     Excessive sedation     Yellow Dye Shortness Of Breath     Linked only to tartazine.     Hydroxyzine Other (See Comments)     Sluggish; unable to wake up     Latex Itching     Other Environmental Allergy      Dust, Mold, Pollen     Phenobarbital Other (See Comments)     unknown     Latex Itching       Past Medical History:   Diagnosis Date     Anxiety      Arthritis      Borderline personality disorder (H)      Cancer (H)      COPD (chronic obstructive pulmonary disease) (H)      Depression      Hyperlipidemia      Hypertension      Hyponatremia      Hypothyroidism      PTSD (post-traumatic stress disorder)      Recurrent otitis media      Seizure disorder (H)      Stroke (H)      Vertigo         Patient Active Problem List   Diagnosis     Epilepsy - Dr. 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       Family History   Problem Relation Age of Onset     Lung cancer Maternal Grandfather 76     COPD Mother      Other Father         estranged     Diabetes Sister      Depression Sister      Alcohol abuse Sister      Other Brother         Missing since 1992     Alcohol abuse Brother      No Medical Problems Sister         Half sister       Reviewed past medical, surgical, and family history with patient found on new patient intake packet located in EMR [employer] tab.     SOCIAL HX: Patient does not work, she is ..  Patient does smoking tobacco occasionally, denies alcohol use, denies history of being heavy drinker, denies recreational drug use.    ROS: Positive for joint pain, muscle pain, muscle fatigue, sciatica, imbalance, dizziness, seizures, insomnia, anxiety/depression, constipation, nausea/vomiting, shortness of breath, wheezing, cough, chest pain, leg swelling, headache.  Specifically negative for bowel/bladder dysfunction, balance changes, dizziness, foot drop, fevers, chills, appetite changes, nausea/vomiting, unexplained weight loss. Otherwise 13 systems reviewed are negative. Please see the patient's intake questionnaire from today for details.    OBJECTIVE:  /86 (Patient Site: Right Arm, Patient Position: Sitting, Cuff Size: Adult Large)   Ht 5' 6\" (1.676 m)   Wt " (!) 343 lb (155.6 kg)   BMI 55.36 kg/m      PHYSICAL EXAMINATION:    --CONSTITUTIONAL:  Vital signs as above.  No acute distress.  The patient is morbidly obese and well groomed.  --PSYCHIATRIC:  Appropriate mood and affect. The patient is awake, alert, oriented to person, place, time and answering questions appropriately with clear speech.    --SKIN:  Skin over the face, bilateral lower extremities, and posterior torso is clean, dry, intact without rashes.    --RESPIRATORY: Normal rhythm and effort. No abnormal accessory muscle breathing patterns noted.   --MUSCULOSKELETAL: Lumbar spine inspection reveals no evidence of deformity. Significant tenderness to palpation lumbar sacral junction as well as mid thoracic region. Straight leg raising in the seated position is negative to radicular pain. Sciatic notch non-tender.  --SACROILIAC JOINT:  One Finger point test negative.  --GROSS MOTOR: Patient is wheelchair-bound.  --LOWER EXTREMITY MOTOR TESTING:  Plantar flexion left 5/5, right 5/5   Dorsiflexion left 5/5, right 5/5   Great toe MTP extension left 5/5, right 5/5  Knee flexion left 5/5, right 5/5  Knee extension left 5/5, right 5/5   Hip flexion left 5/5, right 5/5  Hip abduction left 5/5, right 5/5  Hip adduction left 5/5, right 5/5   --HIPS: Full range of motion bilaterally.   --NEUROLOGICAL:  0/4 patellar, medial hamstring, and achilles reflexes bilaterally.  Sensation to light touch is intact in the bilateral L4, L5, and S1 dermatomes. Babinski is negative. No clonus.  Negative Gerardo reflex bilaterally.  --VASCULAR: Bilateral lower extremities are warm.  There is trace pitting edema of the bilateral lower extremities.    RESULTS: Prior medical records from [facility] 11/13/2019 to current and care everywhere were reviewed today.    Imaging: Lumbar spine Imaging was personally reviewed and interpreted today. The images were shown to the patient and the findings were explained using a spine  model.      MR LUMBAR SPINE WITHOUT CONTRAST  02/14/2016  INDICATION: Lower back pain, question L1 fracture.  TECHNIQUE: Performed without IV contrast.  SEDATION: None.  COMPARISON: Plain films lumbar spine 02/13/2016 and CT abdomen and pelvis 02/05/2016.  FINDINGS: Exam assumes five lumbar type vertebral bodies. Rudimentary rib is seen on the right at L1 on prior CT. Lateral alignment is normal. Normal lumbar lordosis. There is mild compression deformity of the central and anterosuperior L1 endplate with associated marrow edema compatible with an active/acute fracture. There is approximately 10% central superior endplate height loss. No retropulsion. Vertebral body heights are otherwise preserved. Hemangioma anterosuperior aspect of T11. No pars defect.  Dorsal paraspinal musculature and psoas muscles are normal in appearance. Gallbladder is distended. 2.2 x 2.5 cm incompletely visualized right adrenal mass. Visualized osseous pelvis and proximal femora are unremarkable.  T12-L1: Mild expansion and heterogeneous signal within the T12-L1 intervertebral disc compatible with posttraumatic change. Trace disc herniation. Minor facet arthropathy. No spinal canal stenosis. No right neural foraminal stenosis. No left neural foraminal stenosis.  L1-L2: Slight posterior interspace narrowing. Mild loss of normal disc T2 prolongation. No disc herniation. Mild facet arthropathy. No spinal canal stenosis. No right neural foraminal stenosis. No left neural foraminal stenosis.  L2-L3: Normal disc height. Mild loss of normal disc T2 prolongation. Mild facet arthropathy. No spinal canal stenosis. No right neural foraminal stenosis. No left neural foraminal stenosis.  L3-L4: Normal disc height. No disc herniation. Mild loss of normal disc T2 prolongation. Mild hypertrophic facet arthropathy. No spinal canal stenosis. No right neural foraminal stenosis. No left neural foraminal stenosis.  L4-L5: Normal disc height. Mild loss of normal  disc T2 prolongation. Trace posterior disc bulge. Mild to moderate facet arthropathy. No spinal canal stenosis. No right neural foraminal stenosis. No left neural foraminal stenosis.  L5-S1: Mild posterior interspace narrowing. Loss of normal disc T2 prolongation. Shallow central disc herniation. Moderate facet arthropathy with shallow right-sided facet effusion. No spinal canal stenosis. Mild to moderate right neural foraminal stenosis. Mild left neural foraminal stenosis.  CONCLUSION:  1.  There is new compression deformity/fracture superior endplate of L1 without retropulsion.  2.  Shallow posterior disc herniation L5-S1 with moderate associated facet arthropathy. There is mild to moderate right and mild left foraminal stenosis without canal stenosis.  3.  Well-circumscribed 2.2 x 2.5 cm right adrenal mass, incompletely visualized.

## 2021-06-05 NOTE — TELEPHONE ENCOUNTER
RECEIVED HOME HEALTH 60 DAY RECERTIFICATION AND PLAN OF TREATMENT; ALSO 60 DAY NURSING SUMMARY - FOR REVIEW AND SIGNATURE. PLACED IN PROVIDER'S CLINIC MAILBOX

## 2021-06-05 NOTE — PROGRESS NOTES
[facility] Follow Up Note    Valentina Olmedo   59 y.o. female    Date of Visit: 1/29/2020    Chief Complaint   Patient presents with     Hospital Visit Follow Up     back pain     Subjective  This is a 59-year-old patient of Dr. Donald Rooney.  She was seen on January 21 with severe back pain.  I did review Dr. Spann's notes and because of the severity of the pain she was sent to the emergency room for evaluation.  I reviewed those notes as well.  No scans were done.  She was given a few oxycodone to take and advised to follow-up with her own physician.  She is in today to do that.  She continues to have back pain.  She has run out of her pain medication.  In reviewing the chart it looks as though Dr. Rooney okay to refill yesterday but somehow it did not get sent and will need to be done.  They were able to set her up to be seen in the spine clinic tomorrow which is important.  She is here today with her  and her PCA.    ROS A comprehensive review of systems was performed and was otherwise negative    Medications, allergies, and problem list were reviewed and updated    Exam  General Appearance:   Examination is very limited because of her obesity and confinement to the wheelchair.  Blood pressure is 130/80.    Heart rhythm is stable with rate of 100 and no ectopy.    She is sitting very quietly in the wheelchair and does not speak very much.      Assessment/Plan  1. Chronic low back pain without sciatica, unspecified back pain laterality       Ongoing back pain.  We will make sure that the refill prescription is sent in today.  I have strongly encouraged him to keep tomorrow's spine clinic appointment.  She will follow-up with Dr. Rooney sometime after this is been completed.  The following high BMI interventions were performed this visit: weight monitoring     Donald Garcia MD      Current Outpatient Medications on File Prior to Visit   Medication Sig     albuterol (PROAIR  HFA;PROVENTIL HFA;VENTOLIN HFA) 90 mcg/actuation inhaler Inhale 2 puffs every 4 (four) hours as needed for wheezing.     carBAMazepine (TEGRETOL) 200 mg tablet Take 300-400 mg by mouth see administration instructions. 2 tabs (400mg) q AM, 1.5 tabs (300mg) q PM and HS      DULoxetine (CYMBALTA) 60 MG capsule Take 2 capsules (120 mg total) by mouth daily.     EPINEPHrine 0.15 mg/0.15 mL atIn Inject 1 each into the shoulder, thigh, or buttocks.     ethotoin (PEGANONE) 250 mg Tab Take 1,000 mg by mouth 3 (three) times a day.     levothyroxine (SYNTHROID, LEVOTHROID) 200 MCG tablet TAKE 1 TABLET BY MOUTH DAILY     melatonin 3 mg Tab tablet Take 1 tablet (3 mg total) by mouth at bedtime as needed.     omeprazole (PRILOSEC) 20 MG capsule TAKE 1 CAPSULE BY MOUTH DAILY BEFORE BREAKFAST     oxybutynin (DITROPAN XL) 10 MG ER tablet TAKE 1 TABLET BY MOUTH EVERY DAY     oxyCODONE-acetaminophen (PERCOCET/ENDOCET) 5-325 mg per tablet Take 1 tablet by mouth every 4 (four) hours as needed for pain.     QUEtiapine (SEROQUEL) 200 MG tablet Take 1 tablet (200 mg total) by mouth at bedtime (generic. Seroquel)     rosuvastatin (CRESTOR) 20 MG tablet TAKE 1 TABLET BY MOUTH DAILY     SENNA 8.6 mg tablet TAKE 2 TABLETS (17.2MG) BY MOUTH TWICE A DAY     topiramate (TOPAMAX) 25 MG tablet Take 1 tablet (25 mg total) by mouth at bedtime.     VITAMIN D3 2,000 unit capsule TAKE 1 CAPSULE BY MOUTH DAILY     warfarin (COUMADIN/JANTOVEN) 5 MG tablet Take 1 &1/2 tablets (7.5mg) by mouth daily, as directed.  Adjust dose based on INR results. (Patient taking differently: Take 7.5 mg by mouth See Admin Instructions. 7.5 mg daily.)     No current facility-administered medications on file prior to visit.      Allergies   Allergen Reactions     Aspirin (Tartrazine Only) Shortness Of Breath     Can have asa as long as no yellow dye     Gabapentin Rash     Guilherme Johnsons rash     Venom-Honey Bee Anaphylaxis     Vistaril [Hydroxyzine Pamoate] Other (See  Comments)     Excessive sedation     Yellow Dye Shortness Of Breath     Linked only to tartazine.     Hydroxyzine Other (See Comments)     Sluggish; unable to wake up     Latex Itching     Other Environmental Allergy      Dust, Mold, Pollen     Phenobarbital Other (See Comments)     unknown     Latex Itching     Social History     Tobacco Use     Smoking status: Former Smoker     Packs/day: 0.25     Years: 35.00     Pack years: 8.75     Types: Cigarettes     Start date: 6/10/1974     Smokeless tobacco: Never Used     Tobacco comment: smoking 1-2 a day   Substance Use Topics     Alcohol use: No     Drug use: No

## 2021-06-05 NOTE — PROGRESS NOTES
"Date of Service:  2020    Name:  Valentina Olmedo  :  1960  MRN:  [id number]    HPI:   Valentina Olmedo 59 y.o. female with SCAD, bipolar type, PTSD, Neurocognitive disorder, seizure disorder, medical lissues recent hospitalization for delirium.  She comes with  Ren[place] and PCA Pam.      Timeline:  12/10/19, last appt w/me.  Taken to ED, admitted to medicine with AMS (12/10-12), seen by  JUANCARLOS Torres NP and neurology. Irasema fernández'ashlyn.     -2020. [facility] in-pt psych admission for delirium.  Ativan, Benadryl, trazodone dc'd.    ROS: Low back pain (Sent to ED from Dr Rooney appt on ).  Less sedation, doing well with new med changes. Denies SI/HI.  No psychotic sx.       Neurocognitive deficits  Neuropsych testing done (3/20/18).  Note excerpt: \"Implications/recommendations:  Her current test performance demonstrates significant decline relative to last evaluation in .The possibility of significant medication side effects secondary to her anti-epileptic and psychotropic drug regime should be explored, as well possible onset of dementia.\"  --Irais De La Cruz PhD at [facility], 18-----      Therapy-individual therapy with Loretta TAMAYO.      Social  Lives with  in [place] [facility] in [place].  Has 3 cats-Rabia NedYosi king.    Weekly Med set- up.    Medical  Seizure disorder, Neurologist Praveena Benitez, [facility]    Hypothyroid    On warfarin for P.E, 2017       Medications:       Current Outpatient Medications on File Prior to Visit   Medication Sig Dispense Refill     albuterol (PROAIR HFA;PROVENTIL HFA;VENTOLIN HFA) 90 mcg/actuation inhaler Inhale 2 puffs every 4 (four) hours as needed for wheezing. 1 Inhaler 6     carBAMazepine (TEGRETOL) 200 mg tablet Take 300-400 mg by mouth see administration instructions. 2 tabs (400mg) q AM, 1.5 tabs (300mg) q PM and HS        ethotoin (PEGANONE) 250 mg Tab Take 1,000 mg by mouth 3 (three) times a day.   "     levothyroxine (SYNTHROID, LEVOTHROID) 200 MCG tablet TAKE 1 TABLET BY MOUTH DAILY 28 tablet 11     omeprazole (PRILOSEC) 20 MG capsule TAKE 1 CAPSULE BY MOUTH DAILY BEFORE BREAKFAST 90 capsule 3     oxybutynin (DITROPAN XL) 10 MG ER tablet TAKE 1 TABLET BY MOUTH EVERY DAY 30 tablet 11     rosuvastatin (CRESTOR) 20 MG tablet TAKE 1 TABLET BY MOUTH DAILY 28 tablet 11     SENNA 8.6 mg tablet TAKE 2 TABLETS (17.2MG) BY MOUTH TWICE A  tablet 3     VITAMIN D3 2,000 unit capsule TAKE 1 CAPSULE BY MOUTH DAILY 30 capsule 11     warfarin (COUMADIN/JANTOVEN) 5 MG tablet Take 1 &1/2 tablets (7.5mg) by mouth daily, as directed.  Adjust dose based on INR results. (Patient taking differently: Take 7.5 mg by mouth See Admin Instructions. 7.5 mg daily.) 140 tablet 1     [DISCONTINUED] DULoxetine (CYMBALTA) 60 MG capsule Take 2 capsules (120 mg total) by mouth daily. 180 capsule 1     [DISCONTINUED] melatonin 3 mg Tab tablet Take 1 tablet (3 mg total) by mouth at bedtime as needed. 90 tablet 1     [DISCONTINUED] QUEtiapine (SEROQUEL) 200 MG tablet Take 1 tablet (200 mg total) by mouth at bedtime (generic. Seroquel) 90 tablet 1     [DISCONTINUED] topiramate (TOPAMAX) 25 MG tablet Take 1 tablet (25 mg total) by mouth at bedtime. 90 tablet 1     [DISCONTINUED] traZODone (DESYREL) 100 MG tablet Take 2 tablets (200 mg total) by mouth at bedtime. 60 tablet 2     EPINEPHrine 0.15 mg/0.15 mL atIn Inject 1 each into the shoulder, thigh, or buttocks.       oxyCODONE-acetaminophen (PERCOCET/ENDOCET) 5-325 mg per tablet Take 1 tablet by mouth every 4 (four) hours as needed for pain. 8 tablet 0     No current facility-administered medications on file prior to visit.        Associated Clinical Documents:       Notes reviewed in EPIC and [facility] including: medication reconciliation, nurses's notes, progress notes, recent labs, PMH, and OSH records.    ROS:       10 point ROS was negative except for the items listed in HPI.      MSE:       Vital signs: refer to nursing notes from today.   Alert & oriented x 3.  Poor eye contact  Appearance: Seated in w/c, obese, red streaks in hair, appears older than age.  Speech: Slow rate, paucity of words.   Gait: Not observed.  Musculoskeletal: Motor slowing, no abnormal movements.  Mood/Affect: dysphoric.  Thought Process: Perseverative  Thought Content: Denies SI. No homicide ideation.  Associations: Intact, denies delusions.  Perceptions: See HPI  Memory: recent and remote memory deficit, not formally tested  Attention span and concentration: poor  Language: Intact.  Fund of Knowledge: Below normal.  Insight and Judgement: fair    Impression:        1. SCAD, stable  2. Delirium, resolved  3. Neurocognitive disorder- Last tested 3/20/18. Failed Neuropsych appt 8/9/19.  5. R/o CHRIS- multiple referrals and multiple NS's to sleep clinic (last time 7/24).      Pt needs admission to evaluate delirium/medical issues and  inpatient psych to address worsening depression.      Plan:       1.  Pt,  and PCA escorted to by RN and I.   2.  Report given to Teja Lu MD.  3.  Medication reconciliation- MA to contact [facility] home nurse, request fax med list here.      Total Time and Coordination of Care:       40 Minutes spent in the care of this patient with >50% of this time was spent in Face-to-face counseling specifically discussing and educating about medication changes, patient goals, LBP. Wants to transfer PCP care to [facility].      This dictation was completed with speech recognition software and there may be unintended word substitutions.

## 2021-06-05 NOTE — PROGRESS NOTES
"Mental Health Visit Note    1/15/2020    Start time: 3:10pm   Stop Time: 3:55pm  Session # 6    Session Type: Patient is presenting for an Individual session.   Persons present include patient and therapist, patient's spouse (present for collateral in session and per patient request, and because he provides transport).    Valentina Olmedo is a 59 y.o. female is being seen today for   Chief Complaint   Patient presents with      Follow Up     Anxiety     Anxiety with recent MH admission precitpiated by psychosis and behavioral issues     Depression     Mild low mood with recent medical and cognitive issues     Recent behavioral crisis with organic brain issues     Inpatient  admission   .     New symptoms or complaints: Recent mental health admission following psychotic or organic brain issues.    Functional Impairment:   Personal: 3  Family: 3 (no contact with family apart from spouse)  Work: 4  Social: 4  (no social support apart from  and PCA)    Clinical assessment of mental status: Patient's grooming is Disheveled, his attire is Appropriate, and his age appears Older. Behavior towards examiner is Cooperative, motor activity is Within normal and Retarded, and eye contact is Staring.  Patient's mood is Anxious, and affect is Congruent w/content of speech and Anxious.  Speech/language is Delayed/Hesitant, attention is Distractible, and concentration is Brief. Thought process is Slow, thought content is Within noraml and  Within normal.  Patient's orientation is X 3, memory is  Impairment and Recent, judgement is Impairment and Minimal, and estimated intelligence is Below Average. Demonstrated insight is Adequate while fund of knowledge is adequate.    Suicidal/Homicidal Ideation present:  Patient denies suicidal ideation, active plan, or intent. Cites some SI that occurred in the context of psychosis that precipitated MH admisson earlier in the month. \"I did feel suicidal right before I went to the " "hospital and then I forgot what happened\". Agrees to call 911 or present to ER if she has impulses to harm herself.    Patient's impression of their current status:   Patient comes to  clinic following referral from [facility] after not being seen since 11/5/2019.  Acknowledges that at time of mental health admission she was having a hard time and was very confused.  \"They had leather straps on me.\"   She is moving with great difficulty. States her low back hurts.  Says that she \"hardly moved\" while she was in the hospital and \"they wouldn't let me because they wanted to help me.  They were too afraid that I would fall.\"    Patient reports minimal follow-up with therapy homework: She did utilize deep breathing for anxiety.  She followed up with her PCA support but was unable to do any exercise or walking because of pain.  Declines registering for mental health illness and recovery goal setting group.  She is unsure of her ability to make excursions into the community because of her difficulty walking.    Therapist impression of patients current state: 59-year-old  female returns for psychotherapy care having been referred back to psychotherapy during acute inpatient psychiatric admission.  Overall mood actually appears reasonably stable.  No acute anxiety or depressive symptoms despite her statement that she was suicidal before going into the hospital.  Her cognitive status however has been an issue the last couple sessions.  Thinking is slowed and her memory is very poor.  She is able to make use of supportive psychotherapy only and should return on an as-needed basis.  Home PCA and nursing monitoring and support highly recommended.  [facility] records reviewed: indicate that she had a psychotic break, there was concern about toxicity with medications.    Processed negative cognitions, reinforced strengths, validated patient efforts and feelings.  Offered empathic listening and reflections and " questions intended to evoke change talk, explored needs and resources, and provided emotional support.   Discussed supports she has in place.  Discussed potential losses if she does not start walking and moving including falls and loss of independence.  She is agreeable to follow directions of the doctor including referral to physical therapy.  Agreeable to make use of supports in place including home nursing and PCA services.    Type of psychotherapeutic technique provided: Client centered and Solution-focused     Progress toward short term goals: Poor to minimal progress: patient unable to do any therapy homework due to acute cognitive issues.    Review of long term goals: Not done at today's visit and Date of last review 9/29/2019  Treatment plan will be updated if patient returns for care.    Diagnosis:   1. Generalized anxiety disorder    2. Depression, major, recurrent, moderate (H)    3. Borderline personality disorder (H)    4. Chronic post-traumatic stress disorder (PTSD)    5. Cognitive deficits        Plan and Follow up: Patient to return for follow-up on an as-needed basis.  She will see her doctor regarding physical status and possible physical therapy and treatment to attend to back pain.  If cleared by MD, she will try out some walking in the community with supportive her spouse and PCA.  She will utilize deep breathing and grounding her senses for any acute anxiety.  She will travel with  or PCA to make excursions into the community as possible the goal of 2 times per week.    Discharge Criteria/Planning: Client has chronic symptoms and ongoing therapy for maintenance stability recommended.    Loretta Barney MSW, LICSW  1/15/2020

## 2021-06-06 NOTE — TELEPHONE ENCOUNTER
----- Message from Reema Wallis CNP sent at 3/13/2020 12:09 PM CDT -----  Please call patient and notify her that I did review her updated imaging and she does have acute to subacute compression fractures at both L5 and L2 likely contributing to her new pain.  There is a chronic L1 compression fracture that is unchanged from previous imaging as well.  Her thoracic x-ray looks good, no fractures in the thoracic spine.  At this time I would recommend a TLSO back brace for both L2 and L5 fracture, and I did place an order for this that she can wear at all times during the daytime to limit bending and twisting and to help with pain.  She can take this off at nighttime as well as for bathing.  I did also place an order for a bone density scan to further evaluate for osteoporosis given these new fractures.  Also ordered Calciferol nasal spray alternating nares 1 spray/day that can help with fracture pain as well as bone healing.

## 2021-06-06 NOTE — TELEPHONE ENCOUNTER
Pt called back. Results and recommendations given to both pt and . They stated understanding. They would like rx's sent to [facility] pharmacy on file instead of [facility]. Call placed to [facility] in [place] and cancelled rx's. Will have provider place order for [facility].

## 2021-06-06 NOTE — TELEPHONE ENCOUNTER
Agreed patient should be evaluated by PCP if possible or ED if she is bleeding profusely.   Calcitonin prescription sent to [facility] pharmacy.

## 2021-06-06 NOTE — TELEPHONE ENCOUNTER
"ANTICOAGULATION  MANAGEMENT    Valentina Olmedo is on warfarin and had a point of care INR result > 5.5 or an \"error message\" on point of care meter today.    Afternoon INR; STAT venous INR processing and STAT  requested from lab    Spoke with Valentina  And caregiver Pam  ( caregiver ) via phone while at the lab and reviewed triage questions for potential signs and symptoms of bleeding.      Patient Response     Have you had any bleeding in the last week?   No   Have you passed any red, black, or tarry stools in last week?   No   Have you vomited/spit up any red or coffee ground material in last week?   No    Have you had any new, severe abdominal pain or bloating develop in last week?   No   Have you fallen or had any injuries in last week?   No   Do you currently have a severe, sudden onset headache?   No   Do you currently have any severe sudden changes in your vision?   No   Do you currently have any new onset numbness, weakness/paralysis?   No     Assessment/Plan:     Valentina's responses were negative for signs and symptoms of bleeding; may discharge from clinic    Valentina instructed to:       Hold warfarin today until venous INR result returns and receives follow up call from [facility]    Seek medical attention for new signs and symptoms of bleeding or a fall with injury.       "

## 2021-06-06 NOTE — TELEPHONE ENCOUNTER
Dr Rooney    See scan from 2/10, spoke with them they will F/U soon or as you want her to    Ashley Shea CMA ([facility])

## 2021-06-06 NOTE — TELEPHONE ENCOUNTER
FYI - Status Update  Who is Calling: Home Care  Update: Home care would like a call back at [phone], to get the dosing information to assist with medication setup.  Okay to leave a detailed message?:  Yes

## 2021-06-06 NOTE — TELEPHONE ENCOUNTER
Call placed to [facility]. Cancelled rx.     Upon chart review, pt being seeing by PCP today for vaginal bleeding.

## 2021-06-06 NOTE — PROGRESS NOTES
Office Visit - Follow Up   Valentina Olmedo   60 y.o. female    Date of Visit: 3/13/2020    Chief Complaint   Patient presents with     Vaginal Bleeding     Flank Pain     left        Assessment and Plan   1. Gross hematuria  I think she probably has hematuria although she thinks it might be vaginal bleeding.  Will obtain a urinalysis and treat presumptively for infection especially with the left-sided flank pain.  If this does not improve, consider CT to evaluate for stone also because of concern for vaginal bleeding will have her see gynecology.  - Urinalysis-UC if Indicated  - Ambulatory referral to Gynecology  - sulfamethoxazole-trimethoprim (BACTRIM DS) 800-160 mg per tablet; Take 1 tablet by mouth 2 (two) times a day for 7 days.  Dispense: 14 tablet; Refill: 0    2. Visit for screening mammogram  - Mammo Screening Bilateral; Future    3. Pulmonary embolism with infarction (H)  Continue warfarin, she will let the anticoagulation team know she is starting antibiotics  - INR    4. Age-related osteoporosis with current pathological fracture, initial encounter  - Vitamin D, Total (25-Hydroxy)    5. Vitamin D deficiency  - Vitamin D, Total (25-Hydroxy)    6. Essential hypertension with goal blood pressure less than 140/90  Pressure borderline today, continue same, low-salt diet, increase activity and weight loss recommended    Return in about 2 weeks (around 3/27/2020) for recheck.     History of Present Illness   This 60 y.o. old woman comes in for bleeding that she is noticed after she urinates as well as left-sided back pain.  She is equivocal regarding fever and chills.  She has noticed increased urinary frequency.  She has had a hysterectomy.  No body aches.  No lightheadedness dizziness or chest pain.  She is on warfarin.  Would like an INR checked today.    Review of Systems: A comprehensive review of systems was negative except as noted.     Medications, Allergies and Problem List   Reviewed, reconciled and  "updated  Post Discharge Medication Reconciliation Status:      Physical Exam   General Appearance:   No acute distress    /89   Pulse 100   Ht 5' 6\" (1.676 m)   Wt (!) 343 lb (155.6 kg)   SpO2 98%   BMI 55.36 kg/m      Cardiovascular regular rate and rhythm no murmur gallop or rub  Pulmonary lungs are clear to auscultation bilaterally  Gastrointestinal abdomen soft nontender nondistended no organomegaly  Neurologic exam is non focal  Psychiatric pleasant, no confusion or agitation   Some tenderness with palpation along the left flank area     Additional Information   Current Outpatient Medications   Medication Sig Dispense Refill     albuterol (PROAIR HFA;PROVENTIL HFA;VENTOLIN HFA) 90 mcg/actuation inhaler Inhale 2 puffs every 4 (four) hours as needed for wheezing. 1 Inhaler 6     calcitonin, salmon, (MIACALCIN) 200 unit/actuation nasal spray 1 spray into each nostril daily. 3.7 mL 0     carBAMazepine (TEGRETOL) 200 mg tablet Take 300-400 mg by mouth see administration instructions. 2 tabs (400mg) q AM, 1.5 tabs (300mg) q PM and HS        DULoxetine (CYMBALTA) 60 MG capsule Take 2 capsules (120 mg total) by mouth daily. 180 capsule 1     EPINEPHrine 0.15 mg/0.15 mL atIn Inject 1 each into the shoulder, thigh, or buttocks.       ethotoin (PEGANONE) 250 mg Tab Take 1,000 mg by mouth 3 (three) times a day.       levothyroxine (SYNTHROID, LEVOTHROID) 200 MCG tablet TAKE 1 TABLET BY MOUTH DAILY 28 tablet 11     melatonin 3 mg Tab tablet Take 1 tablet (3 mg total) by mouth at bedtime as needed. 90 tablet 1     omeprazole (PRILOSEC) 20 MG capsule TAKE 1 CAPSULE BY MOUTH DAILY BEFORE BREAKFAST 28 capsule 11     oxybutynin (DITROPAN XL) 10 MG ER tablet TAKE 1 TABLET BY MOUTH DAILY 28 tablet 11     QUEtiapine (SEROQUEL) 200 MG tablet Take 1 tablet (200 mg total) by mouth at bedtime (generic. Seroquel) 90 tablet 1     rosuvastatin (CRESTOR) 20 MG tablet TAKE 1 TABLET BY MOUTH DAILY 28 tablet 11     SENNA 8.6 mg " tablet TAKE 2 TABLETS (17.2MG) BY MOUTH TWICE A  tablet 3     topiramate (TOPAMAX) 25 MG tablet Take 1 tablet (25 mg total) by mouth at bedtime. 90 tablet 1     VITAMIN D3 2,000 unit capsule TAKE 1 CAPSULE BY MOUTH DAILY 30 capsule 11     warfarin (COUMADIN/JANTOVEN) 5 MG tablet Take 1 &1/2 tablets (7.5mg) by mouth daily, as directed.  Adjust dose based on INR results. (Patient taking differently: Take 7.5 mg by mouth See Admin Instructions. 7.5 mg daily.) 140 tablet 1     sulfamethoxazole-trimethoprim (BACTRIM DS) 800-160 mg per tablet Take 1 tablet by mouth 2 (two) times a day for 7 days. 14 tablet 0     No current facility-administered medications for this visit.      Allergies   Allergen Reactions     Aspirin (Tartrazine Only) Shortness Of Breath     Can have asa as long as no yellow dye     Gabapentin Rash     Guilherme Ra rash     Venom-Honey Bee Anaphylaxis     Vistaril [Hydroxyzine Pamoate] Other (See Comments)     Excessive sedation     Yellow Dye Shortness Of Breath     Linked only to tartazine.     Hydroxyzine Other (See Comments)     Sluggish; unable to wake up     Latex Itching     Other Environmental Allergy      Dust, Mold, Pollen     Phenobarbital Other (See Comments)     unknown     Latex Itching     Social History     Tobacco Use     Smoking status: Former Smoker     Packs/day: 0.25     Years: 35.00     Pack years: 8.75     Types: Cigarettes     Start date: 6/10/1974     Smokeless tobacco: Never Used     Tobacco comment: smoking 1-2 a day   Substance Use Topics     Alcohol use: No     Drug use: No       Review and/or order of clinical lab tests:  Review and/or order of radiology tests:  Review and/or order of medicine tests:  Discussion of test results with performing physician:  Decision to obtain old records and/or obtain history from someone other than the patient:  Review and summarization of old records and/or obtaining history from someone other than the patient and.or discussion of  case with another health care provider:  Independent visualization of image, tracing or specimen itself:    Time:      Donald Rooney MD

## 2021-06-06 NOTE — TELEPHONE ENCOUNTER
ANTICOAGULATION  MANAGEMENT PROGRAM    Valentina Olmedo is overdue for INR check.     Spoke with Valentina and scheduled INR appointment on 2/20/2020 @ [facility]..      Shonna Ernandez RN

## 2021-06-06 NOTE — TELEPHONE ENCOUNTER
ANTICOAGULATION  MANAGEMENT PROGRAM    Valentina ORNELAS Juan Ramonjuan diego is overdue for INR check.     Spoke with , Jass and scheduled INR appointment on 2/27/20 @ [facility]..      Shonna Ernandez RN
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
## 2021-06-06 NOTE — TELEPHONE ENCOUNTER
Spoke with Makenna from [facility].     - reported INR was >5.5 and measured at 5.7 fingerstick.     - venous draw was ordered - however, patient left without getting drawn.    Also called and left message with , Jass and Valentina.    Spoke with Sandra.     - Valentina reported, she was told she was OK to go home.     - advised to HOLD warfarin dose tonight.     - scheduled INR for Tues. 2/11/20 @ [facility].  To do venous draw.

## 2021-06-06 NOTE — TELEPHONE ENCOUNTER
Called and spoke with Valentina     - patient is requesting Warfarin renewal.     - need to check INR.  Last INR was supratherapeutic.    (had cancelled 2 appts made for her).     - advised to call back and verify current warfarin dose.

## 2021-06-06 NOTE — TELEPHONE ENCOUNTER
Spouse calling; patient present.  States patient started having vaginal bleeding only when using the bathroom.  Slight abdominal pain that is mostly constant.  Patient doesn't feel warm to the touch.  Transferred to scheduling to be seen at clinic today.    Zari Damon RN  [facility] Triage Nurse Advisor    Reason for Disposition    Taking Coumadin (warfarin) or other strong blood thinner, or known bleeding disorder (e.g., thrombocytopenia)    Protocols used: VAGINAL BLEEDING - [id number]

## 2021-06-06 NOTE — TELEPHONE ENCOUNTER
"Phone call to patient to review results and provider's recommendations. Unable to leave message as \"the phone you are trying to reach has restrictions.\"   "

## 2021-06-06 NOTE — TELEPHONE ENCOUNTER
RN cannot approve Refill Request    RN can NOT refill this medication Protocol failed and NO refill given.      Nadeen Stubbs, [facility] Triage/Med Refill 3/13/2020    Requested Prescriptions   Pending Prescriptions Disp Refills     warfarin ANTICOAGULANT (COUMADIN/JANTOVEN) 5 MG tablet [Pharmacy Med Name: WARFARIN 5MG TAB] 42 tablet 0     Sig: TAKE 1 & 1/2 TABLETS BY MOUTH DAILY AS DIRECTED. ADJUST BASED ON INR RESULTS       Warfarin Refill Protocol  Failed - 3/12/2020  1:35 PM        Failed -  Route to appropriate pool/provider     Last Anticoagulation Summary:   Anticoagulation Episode Summary     Current INR goal:   2.0-3.0   TTR:   49.7 % (11.1 mo)   Next INR check:   2/17/2020   INR from last check:   No new INR was available at last check   Weekly max warfarin dose:      Target end date:      INR check location:      Preferred lab:      Send INR reminders to:   [facility]    Indications    Pulmonary embolism with infarction (H) [I26.99]           Comments:            Anticoagulation Care Providers     Provider Role Specialty Phone number    Donald Rooney MD Referring Internal Medicine [phone]                Passed - Provider visit in last year     Last office visit with prescriber/PCP: 1/21/2020 Donald Rooney MD OR same dept: 2/10/2020 Donald Garcia MD OR same specialty: 2/10/2020 Donald Garcia MD  Last physical: 9/13/2018 Last MTM visit: Visit date not found    Next appt within 3 mo: Visit date not found Next physical within 3 mo: Visit date not found  Prescriber OR PCP: Donald Rooney MD  Last diagnosis associated with med order: 1. Pulmonary embolism with infarction (H)  - warfarin ANTICOAGULANT (COUMADIN/JANTOVEN) 5 MG tablet [Pharmacy Med Name: WARFARIN 5MG TAB]; TAKE 1 & 1/2 TABLETS BY MOUTH DAILY AS DIRECTED. ADJUST BASED ON INR RESULTS  Dispense: 42 tablet; Refill: 0    2. Long term (current) use of anticoagulants  - warfarin ANTICOAGULANT  (COUMADIN/JANTOVEN) 5 MG tablet [Pharmacy Med Name: WARFARIN 5MG TAB]; TAKE 1 & 1/2 TABLETS BY MOUTH DAILY AS DIRECTED. ADJUST BASED ON INR RESULTS  Dispense: 42 tablet; Refill: 0    If protocol passes may refill for 6 months if within 3 months of last provider visit (or a total of 9 months).

## 2021-06-06 NOTE — TELEPHONE ENCOUNTER
----- Message from Reema Wallis CNP sent at 3/16/2020 10:17 AM CDT -----  Please call patient and notify her that her vitamin D level is low at 20.6.  Therefore given her current fracture I would like her to take a supplement and did prescribe 1 to take on a daily basis for 3 months, and 1 tablet that she takes every week for 6 weeks.

## 2021-06-06 NOTE — PROGRESS NOTES
[facility] Follow Up Note    Valentina Olmedo   59 y.o. female    Date of Visit: 2/10/2020    Chief Complaint   Patient presents with     can't pee     per pt     Subjective  This is a 59-year-old lady who is a patient of Dr. Donald Rooney.  She has multiple medical issues.  I actually saw her a couple of weeks ago for her back issues and did refer her to spine where she was seen the next day.  She is having an MRI scan later today.  In the interval she did have an admission to psychiatry and is now back at home.  Her complaint today is of an inability to urinate.  Communication is a little difficult as she insisted she was not urinating at all for the past 2 weeks.  I did have a conversation with her with her  and PCA in the room.  What she really means is she is urinating a couple of times a day which she thinks is not as much as she should be considering her fluid intake is good.  No apparent dysuria and no obvious hematuria.    ROS A comprehensive review of systems was performed and was otherwise negative    Medications, allergies, and problem list were reviewed and updated    Exam  General Appearance:   On examination her blood pressure is 110/62.    Heart rhythm is stable with rate of 100 no ectopy.    Palpation of the lower abdomen is somewhat difficult because she is confined to a wheelchair and somewhat obese however, she is not particularly tender nor extremely distended.  Her lower abdomen is soft to palpation as well.      Assessment/Plan  1. Urinary retention  US Bladder   2. Pulmonary embolism with infarction (H)  INR     Possible urinary retention.  I do not think this is a UTI.  I asked if she could give us urine but she did not think it was possible.  There could be some type of mechanical obstruction.  I suggested we do an ultrasound of the bladder today to see if we can detect anything or how much residual urine is there.  She seems agreeable to that so we will have  that completed and have her follow-up with Dr. Rooney.  The following high BMI interventions were performed this visit: weight monitoring    Donald Garcia MD      Current Outpatient Medications on File Prior to Visit   Medication Sig     albuterol (PROAIR HFA;PROVENTIL HFA;VENTOLIN HFA) 90 mcg/actuation inhaler Inhale 2 puffs every 4 (four) hours as needed for wheezing.     carBAMazepine (TEGRETOL) 200 mg tablet Take 300-400 mg by mouth see administration instructions. 2 tabs (400mg) q AM, 1.5 tabs (300mg) q PM and HS      DULoxetine (CYMBALTA) 60 MG capsule Take 2 capsules (120 mg total) by mouth daily.     EPINEPHrine 0.15 mg/0.15 mL atIn Inject 1 each into the shoulder, thigh, or buttocks.     ethotoin (PEGANONE) 250 mg Tab Take 1,000 mg by mouth 3 (three) times a day.     levothyroxine (SYNTHROID, LEVOTHROID) 200 MCG tablet TAKE 1 TABLET BY MOUTH DAILY     lidocaine (LIDODERM) 5 % Place 1 patch on the skin daily for 14 days. Remove & Discard patch within 12 hours or as directed by MD     melatonin 3 mg Tab tablet Take 1 tablet (3 mg total) by mouth at bedtime as needed.     omeprazole (PRILOSEC) 20 MG capsule TAKE 1 CAPSULE BY MOUTH DAILY BEFORE BREAKFAST     oxybutynin (DITROPAN XL) 10 MG ER tablet TAKE 1 TABLET BY MOUTH DAILY     QUEtiapine (SEROQUEL) 200 MG tablet Take 1 tablet (200 mg total) by mouth at bedtime (generic. Seroquel)     rosuvastatin (CRESTOR) 20 MG tablet TAKE 1 TABLET BY MOUTH DAILY     SENNA 8.6 mg tablet TAKE 2 TABLETS (17.2MG) BY MOUTH TWICE A DAY     topiramate (TOPAMAX) 25 MG tablet Take 1 tablet (25 mg total) by mouth at bedtime.     VITAMIN D3 2,000 unit capsule TAKE 1 CAPSULE BY MOUTH DAILY     warfarin (COUMADIN/JANTOVEN) 5 MG tablet Take 1 &1/2 tablets (7.5mg) by mouth daily, as directed.  Adjust dose based on INR results. (Patient taking differently: Take 7.5 mg by mouth See Admin Instructions. 7.5 mg daily.)     No current facility-administered medications on file prior to  visit.      Allergies   Allergen Reactions     Aspirin (Tartrazine Only) Shortness Of Breath     Can have asa as long as no yellow dye     Gabapentin Rash     Guilherme Johnsons rash     Venom-Honey Bee Anaphylaxis     Vistaril [Hydroxyzine Pamoate] Other (See Comments)     Excessive sedation     Yellow Dye Shortness Of Breath     Linked only to tartazine.     Hydroxyzine Other (See Comments)     Sluggish; unable to wake up     Latex Itching     Other Environmental Allergy      Dust, Mold, Pollen     Phenobarbital Other (See Comments)     unknown     Latex Itching     Social History     Tobacco Use     Smoking status: Former Smoker     Packs/day: 0.25     Years: 35.00     Pack years: 8.75     Types: Cigarettes     Start date: 6/10/1974     Smokeless tobacco: Never Used     Tobacco comment: smoking 1-2 a day   Substance Use Topics     Alcohol use: No     Drug use: No

## 2021-06-06 NOTE — TELEPHONE ENCOUNTER
INITIAL INR > 5.5 NOTIFICATION- Anticoagulation Management Program    Valentina Olmedo had an initial point of care INR of 5.7.     Venous confirmation of INR required per protocol. STAT venous sample drawn and being sent out for confirmation.    Anticoagulation template scanned into EHR. Patient phone call with Anticoagulation Management Program (ACM) being arranged for patient triage prior to discharge.     Makenna Cardoza, [facility]

## 2021-06-06 NOTE — TELEPHONE ENCOUNTER
Called and spoke with RAJEEV Hagen from [facility]     - gave warfarin instructions as documented in anticoagulation tracking    (2 days of warfarin held on 2/10 and 2/11.  One time lower dose with 2.5mg warfarin dose on 2/12).     - advised to check INR on 2/17/2020.

## 2021-06-06 NOTE — TELEPHONE ENCOUNTER
----- Message from Donald Garcia MD sent at 2/10/2020  3:54 PM CST -----  Please tell them that there is almost no urine in the bladder so there is no obstruction.  They need to follow-up with Dr. Rooney regarding the function of the kidneys themselves to see if this would explain her low urine volume.

## 2021-06-07 NOTE — TELEPHONE ENCOUNTER
Who is calling:  Patient  Reason for Call:  Home care nurse is needing to speak with ACN for recommendations for patient who is unwilling to come into clinic for INR testing due to Covid-19 , patient also unable to come to another clinic outside of [place] for testing-RN states patient is spending a lot of time in bed as well- Would like to consult with ACN on dosage moving forward.  Date of last appointment with primary care: na  Okay to leave a detailed message: Yes

## 2021-06-07 NOTE — TELEPHONE ENCOUNTER
ANTICOAGULATION  MANAGEMENT PROGRAM    Valentina Olmedo is overdue for INR check.     Spoke with Valentina and scheduled INR appointment on 4/2.      Angella Caraballo RN

## 2021-06-07 NOTE — PROGRESS NOTES
"Valentina Olmedo is a 60 y.o. female who is being evaluated via a billable telephone visit.      The patient has been notified of following:     \"This telephone visit will be conducted via a call between you and your physician/provider. We have found that certain health care needs can be provided without the need for a physical exam.  This service lets us provide the care you need with a short phone conversation.  If a prescription is necessary we can send it directly to your pharmacy.  If lab work is needed we can place an order for that and you can then stop by our lab to have the test done at a later time.    Telephone visits are billed at different rates depending on your insurance coverage. During this emergency period, for some insurers they may be billed the same as an in-person visit.  Please reach out to your insurance provider with any questions.    If during the course of the call the physician/provider feels a telephone visit is not appropriate, you will not be charged for this service.\"    Patient has given verbal consent to a Telephone visit? Yes    Patient would like to receive their AVS by AVS Preference: Aminata.    Additional provider notes: 60-year-old woman who called in today with complaint of itchy watery sticky eyes.  Increasing pain.  Both eyes are now involved.  Whites of the eyes are red and inflamed.  No changes in vision.  No other URI symptoms.  This is a first for her.  She does not get seasonal allergies.    Assessment/Plan:  Probable bilateral conjunctivitis.  Use Polytrim eyedrops, 1 drop in each eye 4 times daily.      Phone call duration:  7 minutes    Bessy Schwarz, [facility]  "

## 2021-06-07 NOTE — TELEPHONE ENCOUNTER
Last appointment: 01/30/2020  Next appointment: None    Notes/Comments:      Rx request(s) has been reviewed. Rx(s) cued up for auth, to be e-scribed to pharm. Thanks.

## 2021-06-07 NOTE — TELEPHONE ENCOUNTER
Valentina went twice to [facility] to  her Polymixin and there was no Rx there or ready.  This writer Called in a verbal order to [facility] on [address].  Telephoned Valentina back to notify her that the Rx was called in and should be ready shortly.  Rx had not been sent to [facility].

## 2021-06-07 NOTE — TELEPHONE ENCOUNTER
Medication RequestMedication   polymyxin B-trimethoprim 10,000 unit- 1 mg/mL ophthalmic drops 1 drop (for POLYTRIM) [[id number]]   polymyxin B-trimethoprim 10,000 unit- 1 mg/mL ophthalmic drops 1 drop (for POLYTRIM)   [[id number]]   Order Details   Ordered Dose: 1 drop  Route: Both Eyes  Frequency: 4 times daily    Administration Dose: 1 drop       Scheduled Start Date/Time: 04/15/20 1700  End Date/Time: --  First Dose: As Scheduled       Diagnosis Association: Acute conjunctivitis of both eyes, unspecified acute conjunctivitis type (H10.33)       Order Status: Active    Ordering User: Tesfaye Morrell MD  Ordering Date/Time: Wed Apr 15, 2020 1605    Ordering Provider: Tesfaye Morrell MD  Authorizing Provider: Tesfaye Morrell MD     (Last 24 hours)   None    Clinic-Administered Medication Detail      Dose  Frequency  Start  End  ISABEL    polymyxin B-trimethoprim 10,000 unit- 1 mg/mL ophthalmic drops 1 drop (for POLYTRIM)  1 drop  4 times daily  4/15/2020   --    Route: Both Eyes    polymyxin B-trimethoprim 10,000 unit- 1 mg/mL ophthalmic drops 1 drop (for POLYTRIM) [[id number]]     Electronically signed by: Tesfaye Morrell MD on 04/15/20 1605  Status: Active    Ordering user: Tesfaye Morrell MD 04/15/20 1605  Ordering provider: Tesfaye Morrell MD    Authorized by: Tesfaye Morrell MD        Medication name:  Requested Pharmacy: [facility]  Reason for request:The patient states her  went to  the prescription at the pharmacy for the eye drops for the eye infection and the pharmacy did not have the prescription.The patient had a telephone visit today with .This writer called the clinic Vocera at the clinic there was no answer.     When did you use medication last?:    Patient offered appointment:  N/A - electronic request  Okay to leave a detailed message: yes-call the patient's husbands cell    [phone]

## 2021-06-07 NOTE — TELEPHONE ENCOUNTER
ANTICOAGULATION  MANAGEMENT    Assessment     4/29INR result of 1.7 is Subtherapeutic (goal INR of 2.0-3.0)        Patient confirmed that she took doses as instructed except that she only took 5 mg dose yesterday due to she did not get the voicemail message.    No new diet changes affecting INR    No new medication/supplements affecting INR    Continues to tolerate warfarin with no reported s/s of bleeding or thromboembolism     Previous INR was Subtherapeutic    Plan:     Spoke with Valentina regarding INR result and instructed:     Warfarin Dosing Instructions:  take 7.5 mg booster dose today then continue current warfarin dose    7.5 mg every Fri; 5 mg all other days     (0 % change)    Instructed patient to follow up no later than: one week- appointment made.    Education provided: importance of therapeutic range and target INR goal and significance of current INR result    Valentina verbalizes understanding and agrees to warfarin dosing plan.    Instructed to call the [facility] for any changes, questions or concerns. (#[phone])   ?   Zari Maria RN    Subjective/Objective:      Valentina Olmedo, a 60 y.o. female is on warfarin.     Valentina reports:     Home warfarin dose: see above     Missed doses: No     Medication changes:  No     S/S of bleeding or thromboembolism:  No     New Injury or illness:  No     Changes in diet or alcohol consumption:  No     Upcoming surgery, procedure or cardioversion:  No    Anticoagulation Episode Summary     Current INR goal:   2.0-3.0   TTR:   48.1 % (1 y)   Next INR check:   5/6/2020   INR from last check:   1.70! (4/29/2020)   Weekly max warfarin dose:      Target end date:      INR check location:      Preferred lab:      Send INR reminders to:   [facility]    Indications    Pulmonary embolism with infarction (H) [I26.99]           Comments:            Anticoagulation Care Providers     Provider Role Specialty Phone number    Donald Rooney MD Referring  Internal Medicine [phone]

## 2021-06-07 NOTE — TELEPHONE ENCOUNTER
FNA triage call :   Presenting problem : Pt called.  Right Eye is very  Itchy, mildly  Painful , with a lot of  white eye discharge  for last week . Currently : denies fever ,  blood shot , eye lid is  Pinkish red / not swollen / drinking normal amount and last voided  Now  Yellow colored urine  ,W/c and walker to ambulate but sometimes I just use my  to walk bathroom and in bed a lot more and falling often 3-4-5 months , vision is ok - just wiped out eye discharge .   Declines triage for anything but eye problem. Denies any head injury.     Guideline used : Eye pus or discharge A OH.   Disposition and recommendations : COVID 19 Nurse Triage Plan/Patient Instructions    Please be aware that novel coronavirus (COVID-19) may be circulating in the community. If you develop symptoms such as fever, cough, or SOB or if you have concerns about the presence of another infection including coronavirus (COVID-19), please contact your health care provider or visit [email or web address].     Disposition/Instructions    Stay home for Covid 19 protocol ,  Have provider phone visit today and sent to .   Thank you for limiting contact with others, wearing a simple mask to cover your cough, practice good hand hygiene habits and accessing our virtual services where possible to limit the spread of this virus.    For more information about COVID19 and options for caring for yourself at home, please visit the CDC website at https://www.cdc.gov/coronavirus/2019-ncov/about/steps-when-sick.html  For more options for care at [facility], please visit our website at [email or web address]    For more information, please use the [place] Department of Health ([facility]) COVID-19 Hotlines (Interpreters available):     Health questions: Phone Number: [phone] or [phone] and Hours: 7 a.m. to 7 p.m.    Schools and  questions: Phone Number: [phone] or [phone] and Hours 7 a.m. to 7  p.m.                      Caller verbalizes understanding and denies further questions and will call back if further symptoms to triage or questions  . Brisa Howell RN  - [place] Nurse Advisor     Reason for Disposition    MODERATE eye pain (e.g., interferes with normal activities)    Protocols used: EYE - PUS OR [id number]

## 2021-06-07 NOTE — TELEPHONE ENCOUNTER
I read through all of yesterday's various notes.  I am confused.  Dr. Morrell ordered eyedrops.  The directions were in his note and on a subsequent prescription.  Their notes after that seen that the pharmacy was called and that the eyedrops were ready to be picked up so I am not sure what the issue is.  If it has not gone unit showed with the directions given by Dr. TURCIOS

## 2021-06-07 NOTE — TELEPHONE ENCOUNTER
Anticoagulation Management    Unable to reach Valentina today.    Today's INR result of 1.7 is Subtherapeutic (goal INR of 2.0-3.0).     Follow up required to confirm warfarin doses taken.    Left message to take a booster dose of warfarin,  10 mg tonight.       ACN to follow up    Angella Caraballo RN

## 2021-06-07 NOTE — PROGRESS NOTES
"S: Patient is a 61 y/o female, 5'6\", 343 lbs seen at our [place] office for the fitting and delivery of her custom TLSO on orders from Reema Wallis CNP for the treatment of Dx: Acute bilateral low back pain without sciatica [M54.5], Acute midline thoracic back pain [M54.6], Closed compression fracture of fifth lumbar vertebra, initial encounter (H) [S32.050A], Closed compression fracture of second lumbar vertebra, initial encounter (H) [S32.020A] and Age-related osteoporosis with current pathological fracture, initial encounter [M80.00XA]. Patient's Epic notes denote that she has a chronic L1 compression fracture that is unchanged on new imaging.     O: Patient presented using a wheelchair for ambulation and was accompanied by her . Patient also presented with a broken right arm in a sling. Patient was alert and oriented throughout our visit today.     G: Patient's custom TLSO will provide support and stability to her affected spinal vertebra to encourage proper healing and prevent further injury during hid recovery period. Patient requires custom TLSO due to her large body habitus not being able to be fit with any OTS option and the multi-level nature of her fractures.     A:  I fit the patient with an abdominal binder while she was supine, but could not slide to posterior portion of her TLSO under her in this position. I then fit the TLSO on the patient as she was standing. Patient was then able to lie down so I could attach the sternal extension to her TLSO and properly tighten it. Patient reported that the TLSO helped with her pain and was not as uncomfortable as on she had in the past.  Patient did point out one problem spot underneath her left arm that was slightly pinching her. I was able to roll the foam in this area while she was still wearing her TLSO and patient found that helped with her discomfort. When I informed the patient that if more modification was needed in this area we would have " to remove her TLSO, she refused and reported that it was comfortable enough. Patient, her , and I then discussed care and use of her new TLSO and they were provided with written care and use instructions. Patient reported she understood all instructions and that all of her questions were answered during the course of our visit.     P: Patient was asked to call our office if there are any issues or questions with the fit/function of her custom TLSO in the future.

## 2021-06-07 NOTE — TELEPHONE ENCOUNTER
FYI - Status Update  Who is Calling: Home Care  Update: Home care nurse was informed by the patient's spouse that the patient had their INR checked about a week and a half ago, and the home care nurse never got a call regarding dosing. Please call the home care nurse back at [phone], with the dosing information that was given for most recent INR and any future dosing instructions, thank you.  Okay to leave a detailed message?:  Yes

## 2021-06-07 NOTE — TELEPHONE ENCOUNTER
"Patient calls nurse triage line this evening stating she is having difficulty picking up her medication from the [facility]. Patient was prescribed Polymyxin eyedrops today sent to [facility] on [address] in [place]. Patient stated her  went to  eyedrops and \"they wouldn't give us the medicine.\" Patient extremely upset and yelling on the phone. RN offered to call over to the pharmacy to see what the issue was with the prescription. Triage RN called the [facility] on patient's behalf. Pharmacist stated that her eyedrops are available and ready to . I relayed this information to the patient and confirmed the pharmacy address, location, and phone number to make sure we were talking about the same location. Patient does not have any further questions at this time.    Silvia Piña RN    Reason for Disposition    Caller has medication question only, adult not sick, and triager answers question    Answer Assessment - Initial Assessment Questions  1. SYMPTOMS: \"Do you have any symptoms?\"      no  2. SEVERITY: If symptoms are present, ask \"Are they mild, moderate or severe?\"      no    Protocols used: MEDICATION QUESTION CALL-A-AH      "

## 2021-06-07 NOTE — PROGRESS NOTES
"S: Patient is a 59 y/o female, 5'6\", 343 lbs seen at our [place] office for the evaluation and measurement of a custom TLSO on orders from Reema Wallis CNP for the treatment of Dx: Acute bilateral low back pain without sciatica [M54.5], Acute midline thoracic back pain [M54.6], Closed compression fracture of fifth lumbar vertebra, initial encounter (H) [S32.050A], Closed compression fracture of second lumbar vertebra, initial encounter (H) [S32.020A]  and Age-related osteoporosis with current pathological fracture, initial encounter [M80.00XA]. Patient's Epic notes denote that she has a chronic L1 compression fracture that is unchanged on new imaging.     O: Patient presented using a wheelchair for ambulation and was accompanied by her . Patient also presented with a broken right arm in a sling. Patient stated multiple times during our appointment that if her TLSO is not comfortable she will not wear it. I informed the patient that we try to make them as comfortable as possible, but I cannot guarantee it will be very comfortable. I also explained that her insurance will be billed for her TLSO once we begin to fabricate it as it has to be custom in nature. Patient was alert and oriented throughout our visit today.     G: Patient's custom TLSO will provide support and stability to her affected spinal vertebra to encourage proper healing and prevent further injury during hid recovery period. Patient requires custom TLSO due to her large body habitus not being able to be fit with any OTS option and the multi-level nature of her fractures.     A: Patient was transferred from her wheelchair to our exam table. I took extensive measurements for fabrication of a custom TLSO. Patient log rolled on her side with assistance from myself in order to acquired posterior and circumferential measurements. No incidents occurred during the measurement process today.     P: Patient will be fit with her custom TLSO  on " Friday afternoon.
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
## 2021-06-07 NOTE — TELEPHONE ENCOUNTER
It appears he ordered it as a in clinic medication. Can you please send a new one to the pharmacy. I do not know the QTY and sig. Please add that. Looks like she is to put in both eyes. Thank you.  Nicolasa Schwarz CSS

## 2021-06-07 NOTE — TELEPHONE ENCOUNTER
ANTICOAGULATION  MANAGEMENT PROGRAM    Valentina Olmedo is overdue for INR check.     Spoke with Valentina and scheduled INR appointment on 4/15.      Angella Caraballo RN

## 2021-06-07 NOTE — TELEPHONE ENCOUNTER
Spoke with Xiao, RN Community [facility],     - she is requesting verification on warfarin dosing.    - (she reported calling on 3/31 and requested verification on warfarin dosing for Valentina.  She had been setting up as 7.5mg on Fridays and 2.5mg all other days, as ordered).     - however, anticoagulation tracking stated differently - 7.5mg on Fridays and 5mg all other days.     - advised very important to ensure INR is check soon.    - Valentina has anxiety going out r/t Covid-19     - scheduled INR on Wed. 4/22/20 @ [facility].  (- Jass will bring patient)     - awaiting INR results.

## 2021-06-07 NOTE — TELEPHONE ENCOUNTER
Message left for Xiao with dosing and follow up instructions as per encounter 3/20/20.  Requested a call back for any additional questions.

## 2021-06-08 NOTE — TELEPHONE ENCOUNTER
Ordered an updated lumbar spine x-ray to evaluate her L2 compression fracture.  She should complete the x-ray and schedule a follow-up video visit, usually the brace should be worn for 2 to 3 months therefore at this point as long as the x-ray is stable we can consider slowly weaning off wearing the brace, however would like to discuss this in a video visit if possible.

## 2021-06-08 NOTE — TELEPHONE ENCOUNTER
Who is calling:  Hamida  Reason for Call:  Nurse needs patients dosing, she is currently with the patient  Date of last appointment with primary care: 5/18/2020  Okay to leave a detailed message: Yes

## 2021-06-08 NOTE — TELEPHONE ENCOUNTER
Call received from Xiao, home health RN for this pt. She inquires about next steps for the pt. Pt was advised to wear TLSO brace for fracture at L2 and L5. Last OV 1/30/2020. Xiao inquires how long she should wear the brace for and what should the pt do in follow-up. Will send message to provider to determine next steps. Ok to leave detailed message upon call back.

## 2021-06-08 NOTE — PROGRESS NOTES
"Valentina Olmedo is a 60 y.o. female who is being evaluated via a billable telephone visit.      The patient has been notified of following:     \"This telephone visit will be conducted via a call between you and your physician/provider. We have found that certain health care needs can be provided without the need for a physical exam.  This service lets us provide the care you need with a short phone conversation.  If a prescription is necessary we can send it directly to your pharmacy.  If lab work is needed we can place an order for that and you can then stop by our lab to have the test done at a later time.    Telephone visits are billed at different rates depending on your insurance coverage. During this emergency period, for some insurers they may be billed the same as an in-person visit.  Please reach out to your insurance provider with any questions.    If during the course of the call the physician/provider feels a telephone visit is not appropriate, you will not be charged for this service.\"    Patient has given verbal consent to a Telephone visit? Yes    What phone number would you like to be contacted at? [phone]        Additional provider notes: Valentina is a 61 y/o patient of Dr. Rooney with a hx of multiple medical problems including COPD, hypothyroidism, and hypertension who called in today in regards to some eye itching and drainage she has been having.  She states that this is been in both eyes and has been going on for about the last 2 months.  She states that the drainage is clear to mildly yellow, and has a significant amount of itching as well.  She was prescribed some Polytrim eyedrops on 4/15 which she said did not help her symptoms at all.  She denies any cough and denies any drainage in the back of her throat.  No fevers.      Assessment/Plan:    Sounds more like allergic conjunctivitis.given its duration and time of year.  I called in a prescription for Patanol eye drops for her, and told her " that if it did not improve within the next few days she should call us back.  Otherwise follow-up as needed.      Phone call duration:  7 minutes

## 2021-06-08 NOTE — TELEPHONE ENCOUNTER
Phone to patient's home health care nurse Xiao. Information from [facility] shared. She reports with the COVID 19 patient is getting home health nursing once every 2 weeks by other nursing staff. Xiao is her primary nurse coordinating the care going out and does phone calls with the patient. Per their last phone visit, Xiao realized patient has not been wearing her brace as the  did not understand how to put it on. Explained that questions regarding application and fit of the brace should be directed to where they got the brace from. Patient has been in bed a lot of time, only getting up to use bathroom or sit up for a bit.     Discussed next steps in care per [facility]'s recommendations. Information provided to arrange the x-rays and follow up visit. Direct nurse navigation line number given to her as well for the patient and . Xiao does report a video visit may be difficult due to limitations of both patient and her .

## 2021-06-08 NOTE — TELEPHONE ENCOUNTER
ANTICOAGULATION  MANAGEMENT    Assessment     Today's INR result of 1.80 is Subtherapeutic (goal INR of 2.0-3.0)        Warfarin taken as previously instructed    No new diet changes affecting INR    No new medication/supplements affecting INR    Continues to tolerate warfarin with no reported s/s of bleedin    Complained of bad allergies with running nose and itchy eyes.  Claritin or Claritin-D not effective.    Plan:     Spoke with Valentina regarding INR result and instructed:    1.  Advised to put in a call to Dr. Rooney to let him know Claritin / Claritin-D not helping her allergies, and if another drug can be prescribed.    Warfarin Dosing Instructions:  (evenings. Has 5mg tabs)   - Change warfarin dose to 5 mg daily on Mondays; and 7.5 mg daily rest of week.   - (5.3 % change)    Instructed patient to follow up no later than:  1-2 wks.    Education provided: importance of consistent vitamin K intake, target INR goal and significance of current INR result and importance of notifying clinic for changes in medications    Valentina verbalizes understanding and agrees to warfarin dosing plan.    Instructed to call the [facility] for any changes, questions or concerns. (#[phone])   ?   Shonna Ernandez RN    Subjective/Objective:      Valentina ORNELAS Honorio, a 60 y.o. female is on warfarin.     Valentina reports:     Home warfarin dose: as updated on anticoagulation calendar per template     Missed doses: No     Medication changes:  No     S/S of bleeding or thromboembolism:  No     New Injury or illness:  Yes  Complained of bad allergies.     Changes in diet or alcohol consumption:  No     Upcoming surgery, procedure or cardioversion:  No    Anticoagulation Episode Summary     Current INR goal:   2.0-3.0   TTR:   39.0 % (1 y)   Next INR check:   6/25/2020   INR from last check:   1.80! (6/11/2020)   Weekly max warfarin dose:      Target end date:      INR check location:      Preferred lab:      Send INR reminders to:   [facility]    Indications    Pulmonary embolism with infarction (H) [I26.99]           Comments:            Anticoagulation Care Providers     Provider Role Specialty Phone number    Donald Rooney MD Referring Internal Medicine [phone]

## 2021-06-08 NOTE — TELEPHONE ENCOUNTER
Who is calling:  [facility]  Reason for Call:  Hamida is calling [facility] for dosing as she is setting up the medications for the patient today 5/20. Please advise at the number.    Okay to leave a detailed message: Yes

## 2021-06-08 NOTE — TELEPHONE ENCOUNTER
"Patient calls today concerned about pink eye. She has itchy watery eyes with sticky drainage. Symptoms have been present for just over a month. Patient was seen at onset of symptoms via a virtual visit on 4/15/20. Patient was prescribed Polytrim eyedrops at that time. She has been using the drops as directed since then but has not had any improvement in symptoms. Denies new or worsening symptoms. Eyes remain pink and itchy. Also has some drainage from both eyes. Denies any eye pain. Denies fevers, sore throat, cough, or runny nose. Denies any loss of vision.     Protocol recommends patient be assessed today for the eye drainage and need for antibiotic eye drops. Pt does not have video visit capabilities. Set up telephone visit at 1630 this afternoon with Dr. Andre Beasley.    Silvia Piña RN        Reason for Disposition    Using antibiotic eyedrops > 3 days but pus persists    Answer Assessment - Initial Assessment Questions  1. EYE DISCHARGE: \"Is the discharge in one or both eyes?\" \"What color is it?\" \"How much is there?\" \"When did the discharge start?\"       Both eyes. First started just over a month ago.  2. REDNESS OF SCLERA: \"Is the redness in one or both eyes?\" \"When did the redness start?\"       Redness in both eyes. Started over a month ago. Was first seen for this issue 4/15/20.  3. EYELIDS: \"Are the eyelids red or swollen?\" If so, ask: \"How much?\"       Denies  4. VISION: \"Is there any difficulty seeing clearly?\"       Patient states vision is more blurry as she has been rubbing eyes and using eye drops a lot. Denies any sustained blurry vision.  5. PAIN: \"Is there any pain? If so, ask: \"How bad is it?\" (Scale 1-10; or mild, moderate, severe)     - MILD (1-3): doesn't interfere with normal activities      - MODERATE (4-7): interferes with normal activities or awakens from sleep     - SEVERE (8-10): excruciating pain, unable to do any normal activities        denies  6. CONTACT LENS: \"Do you wear " "contacts?\"      no  7. OTHER SYMPTOMS: \"Do you have any other symptoms?\" (e.g., fever, runny nose, cough)      no  8. PREGNANCY: \"Is there any chance you are pregnant?\" \"When was your last menstrual period?\"      no    Protocols used: EYE - PUS OR [id number]      "

## 2021-06-08 NOTE — TELEPHONE ENCOUNTER
RECEIVED HOME HEALTH CERTIFICATION AND PLAN OF CARE FOR REVIEW AND SIGNATUARE - PLACED IN PROVIDER'S CLINIC MAILBOX

## 2021-06-08 NOTE — TELEPHONE ENCOUNTER
FYI - Status Update  Who is Calling: Home care   Update: Want to know what the last INR was 06.08.20 and what the dosing is  Okay to leave a detailed message?:  Yes

## 2021-06-08 NOTE — TELEPHONE ENCOUNTER
Spoke with Hamida RN - from [facility].     - needed new warfarin dosing on Valentina.     - advised aHmida, to inform Valentina / , René to schedule INR this 5/22 or on 5/26 or 5/27 @ [facility].     - will continue to adjust warfarin dose based on INR results.

## 2021-06-08 NOTE — TELEPHONE ENCOUNTER
Called and left detailed message with Xiao RN with [facility].     - last INR on 6/11/20 was still sub-therapeutic at 1.80     - warfarin dose was increased to 5mg on Mondays and 7.5mg all other days.     - Valentina is scheduled for Lumbar spine x-rays @ [facility].  Perhaps they can go get INR today @ [facility].

## 2021-06-08 NOTE — TELEPHONE ENCOUNTER
"ANTICOAGULATION  MANAGEMENT    Assessment     Today's INR result of 1.40 is Subtherapeutic (goal INR of 2.0-3.0)        Warfarin taken as previously instructed    - reported did not miss any warfarin doses.    No new diet changes affecting INR    No new medication/supplements affecting INR    Continues to tolerate warfarin with no reported s/s of bleeding or thromboembolism     Previous INR was Subtherapeutic at 1.50 on 5/6/20.    Awaiting for a call from MD @ [facility] today r/t possible pink eye.    Plan:     Spoke with Valentina regarding INR result and instructed:   1.  RN requested INR to be faxed to [facility] at dial \"1\" first, then [phone]    2.  Will call Valentina next wk, to set up next INR appt the week of 5/26.        Warfarin Dosing Instructions:  (has 5mg tabs)   - today, advised one time booster with 10mg warfarin dose,    - then change warfarin dose to 5 mg daily on Mon/Thurs; and 7.5 mg daily rest of week.   - (11.8 % change)    Instructed patient to follow up no later than:   5-7 days.    Education provided: target INR goal and significance of current INR result, importance of taking warfarin as instructed and importance of notifying clinic for changes in medications    Valentina verbalizes understanding and agrees to warfarin dosing plan.    Instructed to call the [facility] for any changes, questions or concerns. (#[phone])   ?   Shonna Ernandez RN    Subjective/Objective:      Valentina Olmedo, a 60 y.o. female is on warfarin.     Valentina reports:     Home warfarin dose: as updated on anticoagulation calendar per template     Missed doses: No.  Valentina reported, she did not miss any warfarin doses.     Medication changes:  No     S/S of bleeding or thromboembolism:  No     New Injury or illness:  No     Changes in diet or alcohol consumption:  No     Upcoming surgery, procedure or cardioversion:  No    Anticoagulation Episode Summary     Current INR goal:   2.0-3.0   TTR:   43.0 % (1 y)   Next INR check:   " 5/26/2020   INR from last check:   1.40! (5/18/2020)   Weekly max warfarin dose:      Target end date:      INR check location:      Preferred lab:      Send INR reminders to:   [facility]    Indications    Pulmonary embolism with infarction (H) [I26.99]           Comments:            Anticoagulation Care Providers     Provider Role Specialty Phone number    Donald Rooney MD Referring Internal Medicine [phone]

## 2021-06-08 NOTE — PROGRESS NOTES
"Mental Health Visit Note    1/19/2017    Start time: 1pm    Stop Time: 1:50   Session # 1    Valentina Olmedo is a 56 y.o. female is being seen today for    Chief Complaint   Patient presents with     Follow-up     Anxiety     Panic Attack     Depression   .     New symptoms or complaints: Anxiety and panic attacks regarding receiving dates on pending [employer] sale and ultimate eviction.     Functional Impairment:   Personal: 3    Family: 3    Work: 4    Social: 3        Clinical assessment of mental status: Patient's grooming is Well groomed, her attire is Appropriate, and her age appears Appears Stated. Behavior towards examiner is Sullen, motor activity is Within normal, and eye contact is Staring. Patient's mood is Irritable and anxious, and affect is flattened but congruent w/content of speech. Speech/language is Delayed/Hesitant and at times loud, attention is Distractible, and concentration is Brief. Thought process is Slow, thought content is free of hallucinations or delusions. Patient's orientation is X 3, memory is Impairment, Recent and Remote, judgement is Minimal, and estimated intelligence is Below Average. Demonstrated insight is Diminished while fund of knowledge is adequate.          Suicidal/Homicidal Ideation present: None Reported This Session    Patient's impression of their current status:    Patient comes to session with her spouse who comes for support and collatoral.  Patient reports huge anxiety about news that they will have 's sale on March 8 and then she and  will have to be out of the townhouse on Sept 2, 2017. \"I'm worried about being out on the street\".  She has none-the-less not had to go to the ED nor has she required inpatient MH.  She did call her [facility] worker who had the crisis team visit her in her home.  She believes she needs to see Dr. Puente earlier than the next appt.  Feels meds are mixed up, not sure her pill boxes are being filled correctly. (Patient " referred to [facility] clinic desk for correct med list.)  They made new purchases, each have new I Phones.  They have put in a total of three applications for subsidized housing, and have another one in the works.  [employer] workers have been helping complete apps. She has been trying to walk 2-3 times/week in shopping malls, etc.    Processed negative cognitions, reinforced strengths, validated patient feelings and efforts.  Discussed patient's improvement in emotional management, increased ability to talk about stressors without shutting down/SI/yelling or ED/hospital care. Patient initially says they won't be able to manage without subsidized housing, we discussed the chance that it won't come through before 9/2/17, but the guaranteed income they have in place and option of market rate rental.  Discussed normal associated costs with same.  Discussed choices they are making which expand or decrease their future options (currently spending $100+ on [employer], $200+ on cell phones, $421 on leased car and insurance). Discussed priority list, patient identifies housing, food, MH services in that order. She agrees to ask for help from [employer] worker around budget for moving, etc.  Continue efforts to walk.    Therapist impression of patients current state: Patient is coping with stressors without ED or hospitalization, denies SI as well. Patient and spouse appear to have quite poor judgement around spending and life management despite assistance of [employer] workers, expect another crisis when they don't have the money to hire movers, etc.    Type of psychotherapeutic technique provided: Client centered, Solution-focused and CBT    Progress toward short term goals:Progress as expected, They have put in a total of three applications for subsidized housing, and have another one in the works.  [employer] workers have been helping complete apps.     Review of long term goals: Not done at today's visit and Date of last review  11/17/16    Diagnosis:   1. Generalized anxiety disorder    2. Depression, major, recurrent, moderate    3. Borderline personality disorder    4. Chronic post-traumatic stress disorder (PTSD)        Plan and Follow up: Patient will follow up in two weeks.  She agrees to ask for help from [facility] worker around budget for moving, etc.  Continue efforts to walk.    Discharge Criteria/Planning: Client has chronic symptoms and ongoing therapy for maintenance stability recommended.    Loretta Barney MSW, [facility] Hospital[facility]  1/19/2017      This note was created with help of Dragon dictation software. Grammatical / typing errors are not intentional and inherent to the software.

## 2021-06-09 ENCOUNTER — OFFICE VISIT - HEALTHEAST (OUTPATIENT)
Dept: BEHAVIORAL HEALTH | Facility: CLINIC | Age: 61
End: 2021-06-09

## 2021-06-09 DIAGNOSIS — F41.1 GENERALIZED ANXIETY DISORDER: ICD-10-CM

## 2021-06-09 DIAGNOSIS — F60.3 BORDERLINE PERSONALITY DISORDER (H): ICD-10-CM

## 2021-06-09 DIAGNOSIS — F25.1 SCHIZOAFFECTIVE DISORDER, DEPRESSIVE TYPE (H): ICD-10-CM

## 2021-06-09 NOTE — TELEPHONE ENCOUNTER
Called and left detailed message with Xiao.     - she needed current warfarin dosing on Valentina.    (10mg warfarin on Mon/Thurs and 7.5mg ROW).     - Valentina had reported drinking ENSURE protein drinks 1-2 cans per day, which was affecting sub-therapeutic INR.     - in addition, reported Seroquel dose was decreased.     - please re-iterate the importance of checking INR next wk, due to the known interactions s mentioned above.

## 2021-06-09 NOTE — PROGRESS NOTES
"Valentina Olmdeo is a 60 y.o. female who is being evaluated via a billable telephone visit.      The patient has been notified of following:     \"This telephone visit will be conducted via a call between you and your physician/provider. We have found that certain health care needs can be provided without the need for a physical exam.  This service lets us provide the care you need with a short phone conversation.  If a prescription is necessary we can send it directly to your pharmacy.  If lab work is needed we can place an order for that and you can then stop by our lab to have the test done at a later time.    Telephone visits are billed at different rates depending on your insurance coverage. During this emergency period, for some insurers they may be billed the same as an in-person visit.  Please reach out to your insurance provider with any questions.    If during the course of the call the physician/provider feels a telephone visit is not appropriate, you will not be charged for this service.\"    Patient has given verbal consent to a Telephone visit? Yes    What phone number would you like to be contacted at?     Patient would like to receive their AVS by AVS Preference: Mail a copy.    Additional provider notes: insert own note template here     Phone call duration: 25 min        HPI:  Valentina Olmedo 60 y.o. female with SCAD, bipolar type, PTSD, Neurocognitive disorder, seizure disorder, hx delirium. Last seen March.  No med changes made.      SOC: , René. Cat, \"[facility]\". Home nurse sets up meds.      ROS: C/o EDS, falling asleep, chronic LBP. Mood ok,  Denies SI/manic/psychotic sx.           ROS:       10 point ROS was negative except for the items listed in HPI.            Current Outpatient Medications   Medication Sig Dispense Refill     albuterol (PROAIR HFA;PROVENTIL HFA;VENTOLIN HFA) 90 mcg/actuation inhaler Inhale 2 puffs every 4 (four) hours as needed for wheezing. 1 Inhaler 6     carBAMazepine " (TEGRETOL) 200 mg tablet Take 300-400 mg by mouth see administration instructions. 2 tabs (400mg) q AM, 1.5 tabs (300mg) q PM and HS        cholecalciferol, vitamin D3, (VITAMIN D3) 50 mcg (2,000 unit) capsule Take 1 capsule (2,000 Units total) by mouth daily. 30 capsule 11     DULoxetine (CYMBALTA) 60 MG capsule Take 2 capsules (120 mg total) by mouth daily. 180 capsule 1     ethotoin (PEGANONE) 250 mg Tab Take 1,000 mg by mouth 3 (three) times a day.       levothyroxine (SYNTHROID, LEVOTHROID) 200 MCG tablet TAKE 1 TABLET BY MOUTH DAILY 28 tablet 11     melatonin 3 mg Tab tablet Take 1 tablet (3 mg total) by mouth at bedtime as needed. 90 tablet 1     olopatadine (PATANOL) 0.1 % ophthalmic solution Administer 1 drop to both eyes 2 (two) times a day. 5 mL 1     omeprazole (PRILOSEC) 20 MG capsule TAKE 1 CAPSULE BY MOUTH DAILY BEFORE BREAKFAST 28 capsule 11     oxybutynin (DITROPAN XL) 10 MG ER tablet TAKE 1 TABLET BY MOUTH DAILY 28 tablet 11     QUEtiapine (SEROQUEL) 100 MG tablet Take 1-2 tablets (100-200 mg total) by mouth at bedtime. 180 tablet 1     rosuvastatin (CRESTOR) 20 MG tablet TAKE 1 TABLET BY MOUTH DAILY 28 tablet 11     SENNA 8.6 mg tablet TAKE 2 TABLETS (17.2MG) BY MOUTH TWICE A  tablet 3     warfarin ANTICOAGULANT (COUMADIN/JANTOVEN) 5 MG tablet Take 1 tablet (5mg) to 1 and 1/2 tablets (7.5mg) by mouth daily, as directed.  Adjust does based on INR results. 110 tablet 1     EPINEPHrine 0.15 mg/0.15 mL atIn Inject 1 each into the shoulder, thigh, or buttocks.       hydrOXYzine pamoate (VISTARIL) 25 MG capsule Take 1 capsule (25 mg total) by mouth 2 (two) times a day as needed for anxiety. 60 capsule 2     Current Facility-Administered Medications   Medication Dose Route Frequency Provider Last Rate Last Dose     polymyxin B-trimethoprim 10,000 unit- 1 mg/mL ophthalmic drops 1 drop (for POLYTRIM)  1 drop Both Eyes Tesfaye Caballero MD           ALLERGIES  Aspirin (tartrazine only);  Gabapentin; Venom-honey bee; Vistaril [hydroxyzine pamoate]; Yellow dye; Hydroxyzine; Latex; Other environmental allergy; Phenobarbital; and Latex        Associated Clinical Documents:       Notes reviewed in EPIC and [facility] including: medication reconciliation, nurses's notes, progress notes, recent labs, PMH, and OSH records.      MSE:      Alert & oriented x 3.   Speech: slow rate  Mood/Affect: Neutral.  Thought Process: slow rate, perseverative  Thought Content: No suicide or homicide ideation.  Associations: Intact, no delusions.  Perceptions: No hallucinations.  Memory: recent memory intact.  Attention span and concentration: fair  Language: Intact.  Fund of Knowledge: Normal.  Insight and Judgement: Adequate.        Impression:        1. SCAD, stable  2. Neurocognitive disorder- Last tested 3/20/18. Failed Neuropsych appt 8/9/19.  5. EDS, R/o CHRIS- multiple referrals and multiple NS's to sleep clinic.      Plan:     Risks and benefits of medication discussed with pt. Teaching done on sleep apnea.  1.  DC  Topamax, decrease seroquel 100mg q hs (MR x1),   Decrease hydroxyzine 25mg two times a day prn anxiety.  2.  Pt agreed to sleep clinic referral   3.  Continue other meds as above. F/u 3 mos.      Start Time 9:00 AM   End Time 9:30 AM      Breana Puente MD     Total Time and Coordination of Care:       30 Minutes spent in the care of this patient with >50% of this time was spent in   counseling specifically discussing and educating about the pharmacologic treatment options, the risks, benefits and side effects of medication, medication adherence, importance of therapy, patient goals and providing supportive therapy.     High risk decision making secondary to drug therapy requiring ongoing lab monitoring. Additional tests such as EKG, potential for multiple interactions, requiring special neuroleptic consent and education of the patient.

## 2021-06-09 NOTE — PATIENT INSTRUCTIONS - HE
Please call [facility] to schedule your image, [phone].    [facility] location-OPEN lying down MRI  [address] [place] [address] [phone]

## 2021-06-09 NOTE — PROGRESS NOTES
Mental Health Visit Note    3/30/2017    Start time: 1:00pm    Stop Time: 1:55   Session # 3    Valentina Olmedo is a 57 y.o. female is being seen today for    Chief Complaint   Patient presents with      Follow Up     Anxiety     Panic Attack     Depression   .   Functional Impairment:   Personal: 3    Family: 3    Work: 4    Social: 3        Clinical assessment of mental status: Patient's grooming is Well groomed, her attire is Appropriate, and her age appears Appears Stated. Behavior towards examiner is Sullen, motor activity is Within normal, and eye contact is Staring. Patient's mood is Irritable and anxious, and affect is flattened but congruent w/content of speech. Speech/language is Delayed/Hesitant, attention is Distractible, and concentration is Brief. Thought process is Slow, thought content is free of hallucinations or delusions. Patient's orientation is X 3, memory is Impairment, Recent and Remote, judgement is Minimal, and estimated intelligence is Below Average. Demonstrated insight is Diminished while fund of knowledge is adequate.      Suicidal/Homicidal Ideation present: None Reported This Session    New symptoms or complaints: ongoing stress about finances, having to move.    Functional Impairment:   Personal: 3    Family: 3    Work: 4    Social: 3        Clinical assessment of mental status: Patient's grooming is Well groomed, her attire is Appropriate, and her age appears Appears Stated. Behavior towards examiner is Sullen, motor activity is Within normal, and eye contact is Staring. Patient's mood is Irritable and anxious, and affect is flattened but congruent w/content of speech. Speech/language is Delayed/Hesitant and at times loud, attention is Distractible, and concentration is Brief. Thought process is Slow, thought content is free of hallucinations or delusions. Patient's orientation is X 3, memory is Impairment, Recent and Remote, judgement is Minimal, and estimated intelligence is Below  "Average. Demonstrated insight is Diminished while fund of knowledge is adequate.      Suicidal/Homicidal Ideation present: None Reported This Session    Patient's impression of their current status:    Patient attends session together with spouse for emotional support.  Patient reports ongoing anxiety, stress about finances, [facility]s [facility] completed March 8, they've been given a date of 9/2/17 that they have to be out.  They expect to receive [employer] longterm starting around June 28.    Patient has been attending day treatment every Tuesday at [facility]. Walking \"a little bit, not much, in stores and in malls\".     Processed negative cognitions, reinforced strengths, validated patient efforts. Offered empathic listening and reflections and questions intended to evoke change talk, explored needs and resources, and provided emotional support. Patient identifies housing as primary, identifies they need to rent a place by August 1 in order to given themselves more time to move, take care of their things.  Patient agrees to walk with spouse. Will continue housing efforts with [facility] worker.    Therapist impression of patients current state: Patient continues to manage symptoms without requiring ED or inpatient psychiatric with supports in place.    Type of psychotherapeutic technique provided: Client centered, Solution-focused and CBT    Progress toward short term goals:Progress as expected, Patient has been attending day treatment every Tuesday at [facility]. Walking \"a little bit, not much, in stores and in malls\".     Review of long term goals: Long-term goals reviewed   and Treatment Plan updated    Diagnosis:   1. Generalized anxiety disorder    2. Depression, major, recurrent, moderate    3. Borderline personality disorder    4. Chronic post-traumatic stress disorder (PTSD)        Plan and Follow up: Patient to return in two weeks for follow up.  Patient agrees to walk " with spouse. Will continue housing efforts with [facility] worker.      Discharge Criteria/Planning: Client has chronic symptoms and ongoing therapy for maintenance stability recommended.    Loretta Barney MSW, LICSW  3/30/2017      This note was created with help of Dragon dictation software. Grammatical / typing errors are not intentional and inherent to the software.

## 2021-06-09 NOTE — PROGRESS NOTES
"Mental Health Visit Note    2/23/2017    Start time: 11am    Stop Time: 11:50am   Session # 2    Valentina Olmedo is a 57 y.o. female is being seen today for    Chief Complaint   Patient presents with     Follow-up     Anxiety     Depression     PTSD   .     New symptoms or complaints: None    Functional Impairment:   Personal: 3    Family: 3    Work: 4    Social: 3        Clinical assessment of mental status: Patient's grooming is Well groomed, her attire is Appropriate, and her age appears Appears Stated. Behavior towards examiner is Sullen, motor activity is Within normal, and eye contact is Staring. Patient's mood is Irritable and anxious, and affect is flattened but congruent w/content of speech. Speech/language is Delayed/Hesitant and at times loud, attention is Distractible, and concentration is Brief. Thought process is Slow, thought content is free of hallucinations or delusions. Patient's orientation is X 3, memory is Impairment, Recent and Remote, judgement is Minimal, and estimated intelligence is Below Average. Demonstrated insight is Diminished while fund of knowledge is adequate.      Suicidal/Homicidal Ideation present: None Reported This Session     Patient's impression of their current status:      Patient reports her anxiety over financial situation and housing continues.  She was able to identify one positive:  They expect patient's spouse to be eligible for $700 SS USP at the time of his 63nd birthday. They are still waiting on answer from SSDI.  If approved they would receive a large back payment.    Processed negative cognitions, reinforced strengths, validated patient efforts.   We processed patient's fear of homelessness, belief that \"we won't have enough\" for housing: looked at market rate rentals for [place]BR in [facility], discussed that they they have plenty for rent but not enough for the multiple commitments they currently have (two brand new Iphones and $200 monthly fees, new " leased car and contract + insurance @ $400, [employer] contract @ $120 etc.)  Discussed the couple coming up with priorities while they search out affordable housing.  Patient agrees to work with [employer] on housing applications, discuss with spouse their financial priorities so that they can know how they will proceed and benefit from reduced anxiety over same.  Will go to the phone store to find out if they signed a contract with the two new phones.    Therapist impression of patients current state: Patient continues to show increased ability to sit in session with the realities of her situation - continues to stay out of ED and out of inpatient MH which previously only seemed to ramp up emotional dependency.  Her ability to problem-solve her stressors continues quite limited.    Type of psychotherapeutic technique provided: Client centered, Solution-focused and CBT    Progress toward short term goals:Progress as expected, able to identify one positive, making use of supportive care from [facility] worker and from psychotherapy.    Review of long term goals: Not done at today's visit and Date of last review 11/17/16    Diagnosis:   1. Generalized anxiety disorder    2. Depression, major, recurrent, moderate    3. Borderline personality disorder    4. Chronic post-traumatic stress disorder (PTSD)        Plan and Follow up: Patient to return in two weeks for follow up.  Patient agrees to work with [employer] on housing applications, discuss with spouse their financial priorities so that they can know how they will proceed and benefit from reduced anxiety over same.  Will go to the phone store to find out if they signed a contract with the two new phones.    Discharge Criteria/Planning: Client has chronic symptoms and ongoing therapy for maintenance stability recommended.    Loretta Barney MSW, LICSW  2/23/2017      This note was created with help of Dragon dictation software. Grammatical / typing errors are not  intentional and inherent to the software.

## 2021-06-09 NOTE — PROGRESS NOTES
"Valentina Olmedo is a 60 y.o. female who is being evaluated via a billable telephone visit.      The patient has been notified of following:     \"This telephone visit will be conducted via a call between you and your physician/provider. We have found that certain health care needs can be provided without the need for a physical exam.  This service lets us provide the care you need with a short phone conversation.  If a prescription is necessary we can send it directly to your pharmacy.  If lab work is needed we can place an order for that and you can then stop by our lab to have the test done at a later time.    Telephone visits are billed at different rates depending on your insurance coverage. During this emergency period, for some insurers they may be billed the same as an in-person visit.  Please reach out to your insurance provider with any questions.    If during the course of the call the physician/provider feels a telephone visit is not appropriate, you will not be charged for this service.\"    Patient has given verbal consent to a Telephone visit? Yes    What phone number would you like to be contacted at?  [phone]    Patient would like to receive their AVS by AVS Preference: Mail a copy.    Phone call duration: 16 minutes    Reema Wallis CNP      Assessment:     Diagnoses and all orders for this visit:    Chronic bilateral low back pain with right-sided sciatica  -     Ambulatory referral to Osteoporosis Care  -     Ambulatory referral for Orthotics / Prosthetics  -     DXA Bone Density Scan; Future; Expected date: 07/01/2020    History of vertebral compression fracture  -     Ambulatory referral to Osteoporosis Care  -     Ambulatory referral for Orthotics / Prosthetics  -     DXA Bone Density Scan; Future; Expected date: 07/01/2020    Age-related osteoporosis with current pathological fracture with delayed healing, subsequent encounter  -     Ambulatory referral to Osteoporosis Care  -     " Ambulatory referral for Orthotics / Prosthetics  -     DXA Bone Density Scan; Future; Expected date: 07/01/2020    Vitamin D deficiency    Osteoporotic compression fracture of spine, with routine healing, subsequent encounter    Age-related osteoporosis with current pathological fracture, initial encounter    Age-related osteoporosis with current pathological fracture, vertebra(e), initial encounter for fracture (H)   -     DXA Bone Density Scan; Future; Expected date: 07/01/2020       Valentina Olmedo is a 60 y.o. y.o. 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  Previous compression fracture L2 and L5 in January 2020 that had progressed on CT scan March 2020, recent x-ray however shows stable fractures.    -Patient is wheelchair-bound at baseline and typically active with transfers only.  Patient does have a home PCA.     Plan:     A shared decision making plan was used. The patient's values and choices were respected. Prior medical records from 1/3/2020 to current were reviewed today. The following represents what was discussed and decided upon by the provider and the patient.        -DIAGNOSTIC TESTS: Images were personally reviewed and interpreted.   --Ordered bone density scan which was previously ordered in March however patient has not scheduled.  --If no improvement with wearing back brace times 2 to 4 weeks recommend updated MRI imaging.  --Lumbar spine x-ray 6/17/2020 does show stable compression fracture at L1 and L5 and inferior endplate of L2.  No acute fractures identified.  Compared with  CT scan 3/12/2020.  --Lumbar spine MRI from 2016 did show acute L1 compression fracture at that time.  Otherwise moderate facet arthropathy L4-5 and L5-S1 with mild degenerative changes.    -INTERVENTIONS: No injection recommendations at this time.    -Order placed for [employer] orth[employer] to-refit current TLSO brace.  If she is unable to maneuver and wear this TLSO brace we can switch her to an LSO back brace for her ongoing L5 and L2 compression fractures.    -Referral to osteoporosis management of osteoporosis, recent history L2 and L5 nontraumatic/pathologic compression fracture.    -MEDICATIONS: No change in medications advised patient continue Tylenol as needed.  Discussed side effects of medications and proper use. Patient verbalized understanding.    -PHYSICAL THERAPY: Could consider physical therapy in 2 to 4 weeks if symptoms are improving.  Patient defers at this time.  Discussed the importance of core strengthening, ROM, stretching exercises with the patient and how each of these entities is important in decreasing pain.  Explained to the patient that the purpose of physical therapy is to teach the patient a home exercise program.  These exercises need to be performed every day in order to decrease pain and prevent future occurrences of pain.        -PATIENT EDUCATION:  16 minutes of total visit time was spent on the telephone with the patient today, 60 % of the visit was spent on counseling, education, and coordinating care.   -5 minutes spent outside of visit time, non-face-to-face time, reviewing chart.  -Today we also discussed the issues related to the current COVID-19 pandemic, the pros and cons of the current treatment plan, the CDC guidelines such as social distancing, washing the hands, and covering the cough.    -FOLLOW UP: Follow-up in 2-4 weeks, sooner if pain is worsening or new symptoms arise.  Advised to contact clinic if symptoms worsen or change.    Subjective:     Valentina Olmedo is a 60  y.o. female who presents today for follow-up via telephone regarding chronic bilateral low back pain that is slightly worse on the right with pain that radiates into the right lower extremity into the anterior thigh and anterior knee.  Pain is been ongoing she reports since January with slight improvement.  Currently her pain is a 4/10 from a 10 at its worst most bothersome with standing and walking as well as twisting or being up on her feet but does get 2 to at its best when she is lying in bed.  She does report that her activity is limited right now due to her pain and she is taking Tylenol with some benefit.  Patient denies any recent trips or falls however denies any balance changes, denies bowel or bladder loss control, denies saddle anesthesia.    Patient is however at baseline not very active and does utilize a wheelchair will long-term she states.  Her PCA has mentioned in the past that she is usually only active for transfers, otherwise does not walk more than a couple of steps at baseline.      PCA or patient's  was not available on the phone call today, as they were present at initial visit in January and very helpful, patient is a poor historian.    -Treatment to Date: No prior spinal surgery or spinal injection.  No prior physical therapy for back pain.     -Medications:  Percocet prescribed by PCP however patient prefers not to take this medication.  Tylenol with no benefit.    Patient Active Problem List   Diagnosis     Epilepsy - Dr. 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 1 Inhaler 6     carBAMazepine (TEGRETOL) 200 mg tablet Take 300-400 mg by mouth see administration instructions. 2 tabs (400mg) q AM, 1.5 tabs (300mg) q PM and HS        cholecalciferol, vitamin D3, (VITAMIN D3) 50 mcg (2,000 unit) capsule Take 1 capsule (2,000 Units total) by mouth daily. 30 capsule 11     DULoxetine (CYMBALTA) 60 MG capsule Take 2 capsules (120 mg total) by mouth daily. 180 capsule 1     EPINEPHrine 0.15 mg/0.15 mL atIn Inject 1 each into the shoulder, thigh, or buttocks.       ethotoin (PEGANONE) 250 mg Tab Take 1,000 mg by mouth 3 (three) times a day.       hydrOXYzine pamoate (VISTARIL) 25 MG capsule Take 1 capsule (25 mg total) by mouth 2 (two) times a day as needed for anxiety. 60 capsule 2     levothyroxine (SYNTHROID, LEVOTHROID) 200 MCG tablet TAKE 1 TABLET BY MOUTH DAILY 28 tablet 11     melatonin 3 mg Tab tablet Take 1 tablet (3 mg total) by mouth at bedtime as needed. 90 tablet 1     olopatadine (PATANOL) 0.1 % ophthalmic solution Administer 1 drop to both eyes 2 (two) times a day. 5 mL 1     omeprazole (PRILOSEC) 20 MG capsule TAKE 1 CAPSULE BY MOUTH DAILY BEFORE BREAKFAST 28 capsule 11     oxybutynin (DITROPAN XL) 10 MG ER tablet TAKE 1 TABLET BY MOUTH DAILY 28 tablet 11     QUEtiapine (SEROQUEL) 100 MG tablet Take 1-2 tablets (100-200 mg total) by mouth at bedtime. 180  tablet 1     rosuvastatin (CRESTOR) 20 MG tablet TAKE 1 TABLET BY MOUTH DAILY 28 tablet 11     SENNA 8.6 mg tablet TAKE 2 TABLETS (17.2MG) BY MOUTH TWICE A  tablet 3     warfarin ANTICOAGULANT (COUMADIN/JANTOVEN) 5 MG tablet Take 1 tablet (5mg) to 1 and 1/2 tablets (7.5mg) by mouth daily, as directed.  Adjust does based on INR results. 110 tablet 1     Current Facility-Administered Medications on File Prior to Encounter   Medication Dose Route Frequency Provider Last Rate Last Dose     polymyxin B-trimethoprim 10,000 unit- 1 mg/mL ophthalmic drops 1 drop (for POLYTRIM)  1 drop Both Eyes Tesfaye Caballero MD           Allergies   Allergen Reactions     Aspirin (Tartrazine Only) Shortness Of Breath     Can have asa as long as no yellow dye     Gabapentin Rash     Guilherme Johnsons rash     Venom-Honey Bee Anaphylaxis     Vistaril [Hydroxyzine Pamoate] Other (See Comments)     Excessive sedation     Yellow Dye Shortness Of Breath     Linked only to tartazine.     Hydroxyzine Other (See Comments)     Sluggish; unable to wake up     Latex Itching     Other Environmental Allergy      Dust, Mold, Pollen     Phenobarbital Other (See Comments)     unknown     Latex Itching       Past Medical History:   Diagnosis Date     Anxiety      Arthritis      Borderline personality disorder (H)      Cancer (H)      COPD (chronic obstructive pulmonary disease) (H)      Depression      Hyperlipidemia      Hypertension      Hyponatremia      Hypothyroidism      PTSD (post-traumatic stress disorder)      Recurrent otitis media      Seizure disorder (H)      Stroke (H)      Vertigo         Review of Systems  ROS: Positive for numbness and tingling, generalized weakness, headache.  Specifically negative for bowel/bladder dysfunction, balance changes, dizziness, foot drop, fevers, chills, appetite changes, nausea/vomiting, unexplained weight loss. Otherwise 13 systems reviewed are negative. Please see the patient's intake  questionnaire from today for details.    Reviewed Social, Family, Past Medical and Past Surgical history with patient, no significant changes noted since prior visit.     Objective:     Virtual telephone visit: No physical exam completed.  --PSYCHIATRIC: The patient is verbally awake, alert, oriented to person, place, time and answering questions appropriately with clear speech. Appropriate mood.    RESULTS:   Imaging: Spine imaging was reviewed today.       Xr Lumbar Spine 2 Or 3 Vws  Result Date: 6/18/2020  EXAM: XR LUMBAR SPINE 2 OR 3 VWS LOCATION: [facility] DATE/TIME: 6/17/2020 1:53 PM INDICATION: Standing x-ray to evaluate L2 compression fracture, please compare with previous imaging. COMPARISON: 3/12/2020.   Stable compression deformities along the superior endplate at L1 and L5 and the inferior endplate at L2. Normal height of L3 and of L4. Stable normal alignment.      MR LUMBAR SPINE WITHOUT CONTRAST  02/14/2016  INDICATION: Lower back pain, question L1 fracture.  TECHNIQUE: Performed without IV contrast.  SEDATION: None.  COMPARISON: Plain films lumbar spine 02/13/2016 and CT abdomen and pelvis 02/05/2016.  FINDINGS: Exam assumes five lumbar type vertebral bodies. Rudimentary rib is seen on the right at L1 on prior CT. Lateral alignment is normal. Normal lumbar lordosis. There is mild compression deformity of the central and anterosuperior L1 endplate with associated marrow edema compatible with an active/acute fracture. There is approximately 10% central superior endplate height loss. No retropulsion. Vertebral body heights are otherwise preserved. Hemangioma anterosuperior aspect of T11. No pars defect.  Dorsal paraspinal musculature and psoas muscles are normal in appearance. Gallbladder is distended. 2.2 x 2.5 cm incompletely visualized right adrenal mass. Visualized osseous pelvis and proximal femora are unremarkable.  T12-L1: Mild expansion and heterogeneous signal within the T12-L1  intervertebral disc compatible with posttraumatic change. Trace disc herniation. Minor facet arthropathy. No spinal canal stenosis. No right neural foraminal stenosis. No left neural foraminal stenosis.  L1-L2: Slight posterior interspace narrowing. Mild loss of normal disc T2 prolongation. No disc herniation. Mild facet arthropathy. No spinal canal stenosis. No right neural foraminal stenosis. No left neural foraminal stenosis.  L2-L3: Normal disc height. Mild loss of normal disc T2 prolongation. Mild facet arthropathy. No spinal canal stenosis. No right neural foraminal stenosis. No left neural foraminal stenosis.  L3-L4: Normal disc height. No disc herniation. Mild loss of normal disc T2 prolongation. Mild hypertrophic facet arthropathy. No spinal canal stenosis. No right neural foraminal stenosis. No left neural foraminal stenosis.  L4-L5: Normal disc height. Mild loss of normal disc T2 prolongation. Trace posterior disc bulge. Mild to moderate facet arthropathy. No spinal canal stenosis. No right neural foraminal stenosis. No left neural foraminal stenosis.  L5-S1: Mild posterior interspace narrowing. Loss of normal disc T2 prolongation. Shallow central disc herniation. Moderate facet arthropathy with shallow right-sided facet effusion. No spinal canal stenosis. Mild to moderate right neural foraminal stenosis. Mild left neural foraminal stenosis.  CONCLUSION:  1.  There is new compression deformity/fracture superior endplate of L1 without retropulsion.  2.  Shallow posterior disc herniation L5-S1 with moderate associated facet arthropathy. There is mild to moderate right and mild left foraminal stenosis without canal stenosis.  3.  Well-circumscribed 2.2 x 2.5 cm right adrenal mass, incompletely visualized.

## 2021-06-09 NOTE — PROGRESS NOTES
Correct pharmacy verified with patient and confirmed in snapshot? [] yes [x]no Roomed by another staff.     Medications Phoned  to Pharmacy [] yes [x]no  Name of Pharmacist:  List Medications, including dose, quantity and instructions    Medication Prescriptions given to patient   [] yes  [x] no   List the name of the drug the prescription was written for.     Medications ordered this visit were e-scribed.  Verified by order class [] yes  [x] no    Medication changes or discontinuations were communicated to patient's pharmacy: [] yes  [x] no    UA collected [] yes    [x] no    [place] Prescription Monitoring Program Reviewed? [] yes  [x] no    Referrals were made to: none    Future appointment was made: [x] yes  [] no    Dictation completed at time of chart check: [] yes  [x] no    I have checked the documentation for today s encounters and the above information has been reviewed and completed.

## 2021-06-09 NOTE — TELEPHONE ENCOUNTER
FYI - Status Update  Who is Calling: Patient  Update: Patient stated that they had a change in their medication from their mental health provider and is not aware of what the names of the medications are. Patient would like the ACN to reach out to the mental health worker and would like to know if this change will have any affect on their warfarin dosing.  Okay to leave a detailed message?:  Yes

## 2021-06-09 NOTE — TELEPHONE ENCOUNTER
Who is calling:  Home Care  Reason for Call:  Needs to know dosing for this patient.  Will be seeing her in her home at 930 this morning  Date of last appointment with primary care: n/a  Okay to leave a detailed message: Yes

## 2021-06-09 NOTE — TELEPHONE ENCOUNTER
----- Message from Noman Yo DO sent at 7/21/2020  1:55 PM CDT -----  Please inform the patient that she does have osteoporosis on DEXA scan.      She should continue the current plan of care from Reema Wallis.

## 2021-06-09 NOTE — PROGRESS NOTES
"Valentina Olmedo is a 60 y.o. female who is being evaluated via a billable telephone visit.      The patient has been notified of following:     \"This telephone visit will be conducted via a call between you and your physician/provider. We have found that certain health care needs can be provided without the need for a physical exam.  This service lets us provide the care you need with a short phone conversation.  If a prescription is necessary we can send it directly to your pharmacy.  If lab work is needed we can place an order for that and you can then stop by our lab to have the test done at a later time.    Telephone visits are billed at different rates depending on your insurance coverage. During this emergency period, for some insurers they may be billed the same as an in-person visit.  Please reach out to your insurance provider with any questions.    If during the course of the call the physician/provider feels a telephone visit is not appropriate, you will not be charged for this service.\"    Patient has given verbal consent to a Telephone visit? Yes    What phone number would you like to be contacted at?  [phone]    Patient would like to receive their AVS by AVS Preference: Aminata.    Phone call duration: 19 minutes    Reema Wallis CNP    Assessment:     Diagnoses and all orders for this visit:    Chronic bilateral low back pain with right-sided sciatica  -     MR Lumbar Spine Without Contrast; Future; Expected date: 07/23/2020    History of vertebral compression fracture  -     MR Lumbar Spine Without Contrast; Future; Expected date: 07/23/2020    Age-related osteoporosis with current pathological fracture with delayed healing, subsequent encounter  -     MR Lumbar Spine Without Contrast; Future; Expected date: 07/23/2020       Valentina Olmedo is a 60 y.o. y.o. 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  Acute to subacute compression fracture 25% L5 and 20% L2 on CT scan 3/20/2020 that had progressed since January, stable on recent x-ray however.  Patient did have another fall a couple weeks ago, no change in pain however.    -Patient is wheelchair-bound at baseline and typically active with transfers only.  Patient does have a home PCA.     Plan:     A shared decision making plan was used. The patient's values and choices were respected. Prior medical records from 7/1/2020 to current were reviewed today. The following represents what was discussed and decided upon by the provider and the patient.        -DIAGNOSTIC TESTS: Images were personally reviewed and interpreted.   --Ordered lumbar spine MRI to further evaluate [facility] open sided lying MRI due to claustrophobia.  --Lumbar spine x-ray 6/17/2020 does show stable compression fracture at L1 and L5 and inferior endplate of L2.  No acute fractures identified.  Compared with CT scan 3/12/2020.  --Lumbar spine MRI from 2016 did show acute L1 compression fracture at that time.  Otherwise moderate facet arthropathy L4-5 and L5-S1 with mild degenerative changes.    -INTERVENTIONS: Could consider right lumbar CANDY pending MRI review if lumbar fractures are healed.  Patient would be open to this.  She is a wheelchair transfer however therefore question if we should do this at the spine center versus [facility].    -Patient scheduled with osteoporosis management clinic Dr. Isabella beck 9/23/2020 for osteoporosis management.  She may benefit from Forteo or Prolia  treatments.    -MEDICATIONS: No change in medications advised patient to continue with Tylenol 500 mg 1 to 2 tablets 3 times daily for pain.  Discussed side effects of medications and proper use. Patient verbalized understanding.    -PHYSICAL THERAPY: Consider home physical therapy pending MRI review.  Discussed the importance of core strengthening, ROM, stretching exercises with the patient and how each of these entities is important in decreasing pain.  Explained to the patient that the purpose of physical therapy is to teach the patient a home exercise program.  These exercises need to be performed every day in order to decrease pain and prevent future occurrences of pain.        -PATIENT EDUCATION:  19 minutes of total visit time was spent on the telephone with the patient today, 60 % of the visit was spent on counseling, education, and coordinating care.   -5 minutes spent outside of visit time, non-face-to-face time, reviewing chart.  -Today we also discussed the issues related to the current COVID-19 pandemic, the pros and cons of the current treatment plan, the CDC guidelines such as social distancing, washing the hands, and covering the cough.    -FOLLOW UP: Follow-up after obtaining lumbar spine MRI to review results and ongoing plan.  Advised to contact clinic if symptoms worsen or change.    Subjective:     Valentina Olmedo is a 60 y.o. female who presents today for follow-up via telephone regarding chronic bilateral low back pain that she reports is at the lower lumbar spine with radiation into the lateral thigh and lateral shin greatest on the right with numbness and tingling in her feet.  She reports that this is slightly improved since January however minimal in the last couple of months.  Currently her pain is a constant 3/10 and a 10 at its worst.  Patient denies any new symptoms today but she did have a fall a couple weeks ago again where she landed on her back she states.  She reports that her back  pain is been about the same however for the last couple of months.  Patient denies any bowel or bladder loss control, denies saddle anesthesia.    Patient reports that she is not very active at baseline and typically is lying most of the day.  She is typically wheelchair bound even before her fractures as well and has a PCA at home for cares.    -Treatment to Date: No prior spinal surgery or spinal injection.  No prior physical therapy for back pain.     -Medications:  Percocet prescribed by PCP however patient prefers not to take this medication.  Tylenol with no benefit.    Patient Active Problem List   Diagnosis     Epilepsy - Dr. 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 1 Inhaler 6     carBAMazepine (TEGRETOL) 200 mg tablet Take 300-400 mg by mouth see administration instructions. 2 tabs (400mg) q AM, 1.5 tabs (300mg) q PM and HS        cholecalciferol, vitamin D3, (VITAMIN D3) 50  mcg (2,000 unit) capsule Take 1 capsule (2,000 Units total) by mouth daily. 30 capsule 11     DULoxetine (CYMBALTA) 60 MG capsule Take 2 capsules (120 mg total) by mouth daily. 180 capsule 1     EPINEPHrine 0.15 mg/0.15 mL atIn Inject 1 each into the shoulder, thigh, or buttocks.       ethotoin (PEGANONE) 250 mg Tab Take 1,000 mg by mouth 3 (three) times a day.       hydrOXYzine pamoate (VISTARIL) 25 MG capsule Take 1 capsule (25 mg total) by mouth 2 (two) times a day as needed for anxiety. 60 capsule 2     levothyroxine (SYNTHROID, LEVOTHROID) 200 MCG tablet TAKE 1 TABLET BY MOUTH DAILY 28 tablet 11     melatonin 3 mg Tab tablet Take 1 tablet (3 mg total) by mouth at bedtime as needed. 90 tablet 1     olopatadine (PATANOL) 0.1 % ophthalmic solution Administer 1 drop to both eyes 2 (two) times a day. 5 mL 1     omeprazole (PRILOSEC) 20 MG capsule TAKE 1 CAPSULE BY MOUTH DAILY BEFORE BREAKFAST 28 capsule 11     oxybutynin (DITROPAN XL) 10 MG ER tablet TAKE 1 TABLET BY MOUTH DAILY 28 tablet 11     QUEtiapine (SEROQUEL) 100 MG tablet Take 1-2 tablets (100-200 mg total) by mouth at bedtime. 180 tablet 1     rosuvastatin (CRESTOR) 20 MG tablet TAKE 1 TABLET BY MOUTH DAILY 28 tablet 11     SENNA 8.6 mg tablet TAKE 2 TABLETS (17.2MG) BY MOUTH TWICE A  tablet 3     warfarin ANTICOAGULANT (COUMADIN/JANTOVEN) 5 MG tablet Take 1.5 to 2 tablets daily by mouth as directed 60 tablet 0     Current Facility-Administered Medications on File Prior to Encounter   Medication Dose Route Frequency Provider Last Rate Last Dose     polymyxin B-trimethoprim 10,000 unit- 1 mg/mL ophthalmic drops 1 drop (for POLYTRIM)  1 drop Both Eyes Tesfaye Caballero MD           Allergies   Allergen Reactions     Aspirin (Tartrazine Only) Shortness Of Breath     Can have asa as long as no yellow dye     Gabapentin Rash     Guilherme Johnsons rash     Venom-Honey Bee Anaphylaxis     Vistaril [Hydroxyzine Pamoate] Other (See Comments)     Excessive  sedation     Yellow Dye Shortness Of Breath     Linked only to tartazine.     Hydroxyzine Other (See Comments)     Sluggish; unable to wake up     Latex Itching     Other Environmental Allergy      Dust, Mold, Pollen     Phenobarbital Other (See Comments)     unknown     Latex Itching       Past Medical History:   Diagnosis Date     Anxiety      Arthritis      Borderline personality disorder (H)      Cancer (H)      COPD (chronic obstructive pulmonary disease) (H)      Depression      Hyperlipidemia      Hypertension      Hyponatremia      Hypothyroidism      PTSD (post-traumatic stress disorder)      Recurrent otitis media      Seizure disorder (H)      Stroke (H)      Vertigo         Review of Systems  ROS: Positive for numbness and tingling, weakness, headache, multiple falls.  Specifically negative for bowel/bladder dysfunction, balance changes, dizziness, foot drop, fevers, chills, appetite changes, nausea/vomiting, unexplained weight loss. Otherwise 13 systems reviewed are negative. Please see the patient's intake questionnaire from today for details.    Reviewed Social, Family, Past Medical and Past Surgical history with patient, no significant changes noted since prior visit.     Objective:     Virtual telephone visit: No physical exam completed.  --PSYCHIATRIC: The patient is verbally awake, alert, oriented to person, place, time and answering questions appropriately with clear speech. Appropriate mood.    RESULTS:   Imaging: Spine imaging was reviewed today.       Dxa Bone Density Scan  Result Date: 7/21/2020 7/14/2020 RE: Valentina Olmedo YOB: 1960 Dear Reema Wallis, Patient Profile: 60 y.o. female, postmenopausal, is here for the follow up bone density test. History of fractures - Yes;  Ribs and Vertebra (trauma). Family history of osteoporosis - None.  Family history of hip fracture: None. Smoking history - Current. Osteoporosis treatment past -  No. Osteoporosis treatment current -  No.  Chronic medical problems - Hyperparathyroidism. High risk medications -  Steroids;  Yes, Currently.   Assessment:   1. The spine bone density L1-L4(L2) with T-score -1.7 and significant decline of 12.7 % compared to 2016.   2. Femoral bone densities show left femoral neck T- score -2.2 and right femoral neck T-score -2.7 and significant decline of 11.9% on the left hip and 10.4% on the right hip compared to 2016.   3. Trabecular bone score indicates good trabecular bone architecture.   5. Right forearm bone density with T-score -2.2. 60 y.o. female with OSTEOPOROSIS and HIGH fracture risk.   Recommendations: Appropriate evaluation and treatment recommended with follow up bone density scan in 1 year.       Xr Lumbar Spine 2 Or 3 Vws  Result Date: 6/18/2020  EXAM: XR LUMBAR SPINE 2 OR 3 VWS LOCATION: [facility] DATE/TIME: 6/17/2020 1:53 PM INDICATION: Standing x-ray to evaluate L2 compression fracture, please compare with previous imaging. COMPARISON: 3/12/2020.   Stable compression deformities along the superior endplate at L1 and L5 and the inferior endplate at L2. Normal height of L3 and of L4. Stable normal alignment.        MR LUMBAR SPINE WITHOUT CONTRAST  02/14/2016  INDICATION: Lower back pain, question L1 fracture.  TECHNIQUE: Performed without IV contrast.  SEDATION: None.  COMPARISON: Plain films lumbar spine 02/13/2016 and CT abdomen and pelvis 02/05/2016.  FINDINGS: Exam assumes five lumbar type vertebral bodies. Rudimentary rib is seen on the right at L1 on prior CT. Lateral alignment is normal. Normal lumbar lordosis. There is mild compression deformity of the central and anterosuperior L1 endplate with associated marrow edema compatible with an active/acute fracture. There is approximately 10% central superior endplate height loss. No retropulsion. Vertebral body heights are otherwise preserved. Hemangioma anterosuperior aspect of T11. No pars defect.  Dorsal paraspinal musculature and  psoas muscles are normal in appearance. Gallbladder is distended. 2.2 x 2.5 cm incompletely visualized right adrenal mass. Visualized osseous pelvis and proximal femora are unremarkable.  T12-L1: Mild expansion and heterogeneous signal within the T12-L1 intervertebral disc compatible with posttraumatic change. Trace disc herniation. Minor facet arthropathy. No spinal canal stenosis. No right neural foraminal stenosis. No left neural foraminal stenosis.  L1-L2: Slight posterior interspace narrowing. Mild loss of normal disc T2 prolongation. No disc herniation. Mild facet arthropathy. No spinal canal stenosis. No right neural foraminal stenosis. No left neural foraminal stenosis.  L2-L3: Normal disc height. Mild loss of normal disc T2 prolongation. Mild facet arthropathy. No spinal canal stenosis. No right neural foraminal stenosis. No left neural foraminal stenosis.  L3-L4: Normal disc height. No disc herniation. Mild loss of normal disc T2 prolongation. Mild hypertrophic facet arthropathy. No spinal canal stenosis. No right neural foraminal stenosis. No left neural foraminal stenosis.  L4-L5: Normal disc height. Mild loss of normal disc T2 prolongation. Trace posterior disc bulge. Mild to moderate facet arthropathy. No spinal canal stenosis. No right neural foraminal stenosis. No left neural foraminal stenosis.  L5-S1: Mild posterior interspace narrowing. Loss of normal disc T2 prolongation. Shallow central disc herniation. Moderate facet arthropathy with shallow right-sided facet effusion. No spinal canal stenosis. Mild to moderate right neural foraminal stenosis. Mild left neural foraminal stenosis.  CONCLUSION:  1.  There is new compression deformity/fracture superior endplate of L1 without retropulsion.  2.  Shallow posterior disc herniation L5-S1 with moderate associated facet arthropathy. There is mild to moderate right and mild left foraminal stenosis without canal stenosis.  3.  Well-circumscribed 2.2 x 2.5  cm right adrenal mass, incompletely visualized.

## 2021-06-09 NOTE — PROGRESS NOTES
"  Office Visit - Follow Up   Valentina Olmedo   57 y.o. female    Date of Visit: 4/4/2017    Chief Complaint   Patient presents with     Follow-up     bilateral ears plugged would like cleaned.        Assessment and Plan   1. Bilateral impacted cerumen  Removed with microcurette    2. Pulmonary embolism with infarction  She will continue Lovenox and warfarin and I will have her enrolled in her anticoagulation monitoring program  - Ambulatory referral to Anticoagulation Monitoring    3. Chronic kidney disease (CKD), stage 3 (moderate)  Creatinine has been stable    4. Essential hypertension with goal blood pressure less than 140/90  Blood pressure controlled    5. Nicotine dependence, cigarettes, with other nicotine-induced disorders  Cessation advised    The following high BMI interventions were performed this visit: encouragement to exercise and lifestyle education regarding diet    Return in about 2 days (around 4/6/2017) for recheck.     History of Present Illness   This 57 y.o. old woman comes in for post hospital follow-up in for evaluation of ear plugging.  She was discharged today from [facility].  She was diagnosed with pulmonary embolus and pulmonary infarct.  She was started on Lovenox and warfarin.  She is having neck pain and right upper quadrant abdominal pain.  This is improved.  She did not have any leg swelling.  She does not believe she has ever had a blood clot.  She does not endorse any trauma or immobility recently.  She seems to be tolerating medications.  We did review her hospitalization.  Breathing stable.    Review of Systems: A comprehensive review of systems was negative except as noted.     Medications, Allergies and Problem List   Reviewed and updated     Physical Exam   General Appearance:   No acute distress    /82  Pulse 77  Ht 5' 6\" (1.676 m)  Wt (!) 310 lb (140.6 kg)  SpO2 97%  BMI 50.04 kg/m2    Bilateral impacted cerumen removed with a warm water lavage and " microcurette bilaterally, tympanic membranes appear normal after removal.  HEENT exam otherwise unremarkable neck supple cardiovascular regular rate rhythm no murmur gallop or rub lungs are clear to auscultation bilaterally abdomen is obese soft nontender nondistended no organomegaly she has a small amount of lower extremity edema bilaterally neurologic exam is nonfocal psychiatric flat affect otherwise appropriate no confusion or agitation      Additional Information   Current Outpatient Prescriptions   Medication Sig Dispense Refill     albuterol (PROVENTIL HFA;VENTOLIN HFA) 90 mcg/actuation inhaler Inhale 2 puffs every 4 (four) hours as needed for wheezing. 1 Inhaler 6     atorvastatin (LIPITOR) 20 MG tablet Take 1 tablet by mouth every evening for cholesterol 30 tablet 0     benztropine (COGENTIN) 1 MG tablet Take 1 tablet (1 mg total) by mouth bedtime. 30 tablet 11     carBAMazepine (TEGRETOL) 100 mg chewable tablet Chew 300 mg 2 (two) times a day. In AFTERNOON and BEDTIME       carBAMazepine (TEGRETOL) 200 mg tablet Take 400 mg by mouth every morning.       diphenhydrAMINE (BENADRYL) 50 MG tablet Take 1 tablet (50 mg total) by mouth 2 (two) times a day as needed for sleep (anxiety or agitation). 60 tablet 11     DULoxetine (CYMBALTA) 60 MG capsule Take 2 capsules (120 mg total) by mouth daily. 60 capsule 11     enoxaparin (LOVENOX) 300 mg/3 mL Soln Inject 1.4 mL (140 mg total) under the skin every 12 (twelve) hours. 6 mL 0     ethotoin (PEGANONE) 250 mg Tab Take 1,000 mg by mouth 3 (three) times a day.       levothyroxine (SYNTHROID, LEVOTHROID) 175 MCG tablet Take 175 mcg by mouth Daily at 6:00 am.       losartan (COZAAR) 50 MG tablet Take 50 mg by mouth daily.       omeprazole (PRILOSEC) 20 MG capsule Take 20 mg by mouth Daily before breakfast.       polyethylene glycol (MIRALAX) 17 gram packet Take 17 g by mouth daily as needed.        QUEtiapine (SEROQUEL) 200 MG tablet Take 1 tablet (200 mg total) by  mouth bedtime. 30 tablet 11     QUEtiapine (SEROQUEL) 50 MG tablet Take 100 mg by mouth every morning.       risperiDONE (RISPERDAL) 3 MG tablet Take 1 tablet (3 mg total) by mouth bedtime. 30 tablet 11     senna (SENOKOT) 8.6 mg tablet Take 2 tablets by mouth 2 (two) times a day.       traZODone (DESYREL) 100 MG tablet Take 2 tablets (200 mg total) by mouth bedtime. 60 tablet 11     VITAMIN D3 2,000 unit cap Take 1 capsule (2,000 units total) by mouth daily (vitamin d3) 90 each 3     warfarin (COUMADIN) 5 MG tablet Take 1 tablet April 4 and 5 and then as directed by her physician 60 tablet 0     EPINEPHrine 0.15 mg/0.15 mL atIn Inject 1 each into the shoulder, thigh, or buttocks.       No current facility-administered medications for this visit.      Allergies   Allergen Reactions     Aspirin (Tartrazine Only) Shortness Of Breath     Can have asa as long as no yellow dye     Gabapentin Rash     Guilherme Johnsons rash     Venom-Honey Bee Anaphylaxis     Vistaril [Hydroxyzine Pamoate] Other (See Comments)     Excessive sedation     Yellow Dye Shortness Of Breath     Linked only to tartazine.     Hydroxyzine Other (See Comments)     Sluggish; unable to wake up     Latex Itching     Other Environmental Allergy      Dust, Mold, Pollen     Phenobarbital Other (See Comments)     unknown     Latex Itching     Social History   Substance Use Topics     Smoking status: Current Every Day Smoker     Packs/day: 0.50     Years: 35.00     Types: Cigarettes     Start date: 6/10/1974     Smokeless tobacco: Never Used      Comment: tried chanitx and patches in past     Alcohol use No       Review and/or order of clinical lab tests: Reviewed  Review and/or order of radiology tests: Reviewed  Review and/or order of medicine tests: Reviewed echocardiogram  Discussion of test results with performing physician:  Decision to obtain old records and/or obtain history from someone other than the patient:  Review and summarization of old records  and/or obtaining history from someone other than the patient and.or discussion of case with another health care provider: Reviewed his history and physical and discharge summary, summarized above  Independent visualization of image, tracing or specimen itself:    Time:      Donald Rooney MD

## 2021-06-09 NOTE — TELEPHONE ENCOUNTER
Called and talked with Valentina,     -she informed on medication reduction - Seroquel from 200mg to 100mg at bedtime.    (per [employer], Concurrent use of QUETIAPINE and WARFARIN may result in increased INR and risk for bleeding.)      - stressed the importance to ensure she keeps her INR appt due to known interaction with warfarin.
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
## 2021-06-09 NOTE — TELEPHONE ENCOUNTER
Call placed to pt with results and recommendations. Pt stated understanding. Upon chart review, pt was to have 2-4 week follow-up appt from last visit. Offered f/u appt to pt. Pt agreeable to this. Transferred pt to scheduling to make appt.

## 2021-06-09 NOTE — TELEPHONE ENCOUNTER
Called and spoke with RAJEEV Hagen Case Manager.     - requesting Home Monitor for Valentina and RN will test patient's INR during their wkly visits.     - Also, would like ALL INR RESULTS FAXED or CALLED to RAJEEV Hagen.      Anticoagulation Management    Discussed INR home monitoring program with RAJEEV Hagen Case Manager for Valentina Olmedo reviewing:   - Valentina would like to get a home INR monitor, since she does not drive and has hard time getting to appts relying on other people.   - RAJEEV Hagen will be the  - [phone]   - RAJEEV Hagen will be testing Valentina's INR's on her wkly visits to set up medications and check INRs.      Elibigility requirements: >= 3 months of anticoagulation therapy, indication for chronic anticoagulation and order from provider    Required testing frequency (q1-2 weeks)    Home meters, testing supplies, meter training, and reporting of INR results done through an outside company. Patient would be contacted by home monitoring company to review insurance coverage with home monitoring company prior to enrolling.    [facility] would continue to receive and manage INR results.    Home monitoring application may take several weeks and must continue to follow up with recommended INR monitoring in clinic until receives monitor and training completed.     Home monitoring terms reviewed with patient      Patient agrees to frequency of testing as directed by referring provider ( weekly or biweekly) Yes    Testing to be performed during business hours of [facility] Yes    Patient agrees they have the skill ( or a designated caregiver) necessary to perform the self test Yes    Patient agrees to report all INR results to INR home monitoring company Yes    Patient agrees to have additional INR test in clinic if a home result is critical Yes    Patient agrees to schedule an INR test at a [facility] clinic yearly for technique observation and quality check of INR results with their home meter Yes    Patient agrees to  use a [facility] approved service provider and device for home monitoring Yes          Valentina Olmedo is interested home INR monitoring and requests order be submitted.        Shonna Ernandez RN

## 2021-06-09 NOTE — TELEPHONE ENCOUNTER
Who is calling:  Marcella Govea [facility]  Reason for Call:  Marcella is requesting dosing instruction for the nurse in the home for the patient today. Please advise at the number provided.    Okay to leave a detailed message: Yes

## 2021-06-09 NOTE — TELEPHONE ENCOUNTER
RN cannot approve Refill Request    RN can NOT refill this medication Protocol failed and NO refill given. Last office visit: 3/13/2020 Donald Rooney MD Last Physical: 9/13/2018 Last MTM visit: Visit date not found Last visit same specialty: 3/13/2020 Donald Rooney MD.  Next visit within 3 mo: Visit date not found  Next physical within 3 mo: Visit date not found      Nadeen Stubbs, [facility] Triage/Med Refill 7/22/2020    Requested Prescriptions   Pending Prescriptions Disp Refills     warfarin ANTICOAGULANT (COUMADIN/JANTOVEN) 5 MG tablet [Pharmacy Med Name: WARFARIN 5MG TAB] 42 tablet 0     Sig: TAKE 1 TO 1 AND 1/2 TABLETS BY MOUTH DAILY AS DIRECTED ADJUST DOSE BASED ON       Warfarin Refill Protocol  Failed - 7/22/2020 10:41 AM        Failed -  Route to appropriate pool/provider     Last Anticoagulation Summary:   Anticoagulation Episode Summary     Current INR goal:   2.0-3.0   TTR:   39.9 % (11.9 mo)   Next INR check:   7/27/2020   INR from last check:   No new INR was available at last check   Weekly max warfarin dose:      Target end date:      INR check location:      Preferred lab:      Send INR reminders to:   [facility]    Indications    Pulmonary embolism with infarction (H) [I26.99]           Comments:            Anticoagulation Care Providers     Provider Role Specialty Phone number    Donald Rooney MD Referring Internal Medicine [phone]                Passed - Provider visit in last year     Last office visit with prescriber/PCP: 3/13/2020 Donald Rooney MD OR same dept: 3/13/2020 Donald Rooney MD OR same specialty: 3/13/2020 Donald Rooney MD  Last physical: 9/13/2018 Last MTM visit: Visit date not found    Next appt within 3 mo: Visit date not found Next physical within 3 mo: Visit date not found  Prescriber OR PCP: Donald Rooney MD  Last diagnosis associated with med order: 1. Pulmonary embolism with infarction (H)  -  warfarin ANTICOAGULANT (COUMADIN/JANTOVEN) 5 MG tablet [Pharmacy Med Name: WARFARIN 5MG TAB]; TAKE 1 TO 1 AND 1/2 TABLETS BY MOUTH DAILY AS DIRECTED ADJUST DOSE BASED ON  Dispense: 42 tablet; Refill: 0    2. Long term (current) use of anticoagulants  - warfarin ANTICOAGULANT (COUMADIN/JANTOVEN) 5 MG tablet [Pharmacy Med Name: WARFARIN 5MG TAB]; TAKE 1 TO 1 AND 1/2 TABLETS BY MOUTH DAILY AS DIRECTED ADJUST DOSE BASED ON  Dispense: 42 tablet; Refill: 0    If protocol passes may refill for 6 months if within 3 months of last provider visit (or a total of 9 months).

## 2021-06-09 NOTE — PATIENT INSTRUCTIONS - HE
~Please call Nurse Navigation line [phone] with any questions or concerns about your treatment plan, if symptoms worsen and you would like to be seen urgently, or if you have problems controlling bladder and bowel function.

## 2021-06-09 NOTE — PROGRESS NOTES
Date of Service:  2017    Name:  Valentina Olmedo  :  1960  MRN:  [id number]    HPI:   Valentina Olmedo is a 57 y.o. female with  RUY, borderline personality disorder, PTSD, hypothyroid, seizure disorder, major depressive disorder.  I last saw her in Nov.    Ayden accompanies her today.      Reports mood stable.  Increased anxiety regarding move.  Denies psychotic symptoms.  They are looking for a place to move, to move by September.    Weekly Med set up by William with [employer]   During this visit I called her.  She said she had not been putting Benadryl in med set up. Said she will do this.    Has 3 cats-Rabia, Nedfernando Yosi.  She said they help with anxiety.      Therapy-appointments with Loretta every 2 weeks.        Medications:      Cymbalta 120 mg daily  Trazodone 200 mg daily at bedtime  Seroquel [id number] mg daily at bedtime  Risperdal 3 mg daily at bedtime  Benadryl 50 mg bid prn anxiety    Cogentin 1 mg/ day  Synthroid 175  g daily  Vit D3 2,000 Units daily    For seizure disorder:   Tegretol 400 mg a.m. 300 mg twice a day  Ethotoin 1000 mg 3 times a day        Associated Clinical Documents:       Notes reviewed in EPIC and [facility] including: medication reconciliation, nurses's notes, progress notes, recent labs, PMH, and OSH records.    ROS:       10 point ROS was negative except for the items listed in HPI.  More alert and less anxiety, no psychotic symptoms      MSE:      Vital signs: refer to nursing notes from today.   Alert & oriented x 3.   Appearance: Appears stated age, casually dressed.  Speech: Slow rate, rhythm and tone.  Gait: Normal.  Musculoskeletal: Normal strength, no abnormal movements.  Mood/Affect: Flat  Thought Process: slow, circumferential  Thought Content: No suicide or homicide ideation.  Associations: Intact, no delusions.  Perceptions: No hallucinations.  Memory: recent and remote memory intact.  Attention span and concentration: normal.  Language:  Intact.  Fund of Knowledge: Normal.  Insight and Judgement: Adequate.    Impression:        1. borderline personality disorder,stable.   2. major depressive disorder, recurrent moderate- in remission.  3.  Adjustment disorder with anxious mood-will start using prn Benadryl for anxiety.  Wrote support letter for cat.    Plan:       1.  Continue meds as above. F/u 2 mos.  2. Continue therapy.  3.  Letter of support for cat given to Loretta [facility]ney [facility].      Total Time:       25 minutes      Coordination of Care:       25 Minutes spent on this visit with >50% time spent on coordination of care with staff, educating patient and family about diagnosis, treatment options, risks, benefits and side effects of medications, and providing supportive therapy.  Time also spent on reviewing  EHR, documentation and entering orders.      This dictation was completed with speech recognition software and there may be unintended word substitutions.

## 2021-06-09 NOTE — TELEPHONE ENCOUNTER
RN to call Xiao RN.  Tell Xiao she can support pt by frequent visits, education about sedating medications, and making sure pt follows through with sleep clinic referral.

## 2021-06-09 NOTE — PROGRESS NOTES
Pt here for follow up with her spouse Ayden.    Depression 2/5   Anxiety 4/5 has increased, Pt is nervous about the move from their house and downsizing.  Sleep getting about 8 hours  Denies SI/HI at this time.

## 2021-06-09 NOTE — TELEPHONE ENCOUNTER
ANTICOAGULATION  MANAGEMENT    Assessment     Today's INR result of 1.60 is Subtherapeutic (goal INR of 2.0-3.0)        Warfarin taken as previously instructed    started drinking ENSURE may be affecting diet and INR    - drinking 1-2 cans ENSURE per day    No new medication/supplements affecting INR    Continues to tolerate warfarin with no reported s/s of bleeding or thromboembolism     Previous INR was Subtherapeutic at 1.80 on     Plan:     Spoke with Valentina regarding INR result and instructed:     Warfarin Dosing Instructions:  (evenings.  Has 5mg tabs)   - Change warfarin dose to 10 mg daily on Mon / Thurs; and 7.5 mg daily rest of week.   - (10 % change)    Instructed patient to follow up no later than:  1-2 wks.    Education provided: importance of consistent vitamin K intake, target INR goal and significance of current INR result and importance of notifying clinic for changes in medications    Valentina verbalizes understanding and agrees to warfarin dosing plan.    Instructed to call the [facility] for any changes, questions or concerns. (#[phone])   ?   Shonna Ernandez RN    Subjective/Objective:      Valentina ORNELAS Honorio, a 60 y.o. female is on warfarin.     Valentina reports:     Home warfarin dose: as updated on anticoagulation calendar per template     Missed doses: No     Medication changes:  No     S/S of bleeding or thromboembolism:  No     New Injury or illness:  No     Changes in diet or alcohol consumption:  Yes:  Reported, she forgot to mention, she is now drinking ENSURE protein drinks 1-2 cans per day.     Upcoming surgery, procedure or cardioversion:  No    Anticoagulation Episode Summary     Current INR goal:   2.0-3.0   TTR:   39.1 % (1 y)   Next INR check:   7/10/2020   INR from last check:   1.60! (6/29/2020)   Weekly max warfarin dose:      Target end date:      INR check location:      Preferred lab:      Send INR reminders to:   [facility]    Indications    Pulmonary embolism with  infarction (H) [I26.99]           Comments:            Anticoagulation Care Providers     Provider Role Specialty Phone number    Donald Rooney MD Referring Internal Medicine [phone]

## 2021-06-09 NOTE — TELEPHONE ENCOUNTER
Per Dr. Puente's note from yesterday, 6/30/2020, pt appt. Staff Rn called pt pharmacy [facility] and informed them of changes noted in plan and on pt current med list. Spoke with pharmacist Taisha. Taisha confirmed that pharmacy had received the new prescription for Seroquel and hydroxyzine.     Plan:     Risks and benefits of medication discussed with pt. Teaching done on sleep apnea.  1.  DC  Topamax, decrease seroquel 100mg q hs (MR x1),   Decrease hydroxyzine 25mg two times a day prn anxiety.  2.  Pt agreed to sleep clinic referral   3.  Continue other meds as above. F/u 3 mos    Staff received fax from patient's nurse Xiao requesting provider signature with changes. It will be placed in Dr. Puente's mail box.     Staff called Xiao and informed her that pharmacy was notified of the changes. She stated that patient will not be starting the Hydroxyzine till next week as it was not send to the home today and the nurse who went to set up pt med was not able to pick it up as the pharmacy was not opened early to do so. However, Wednesday of next week, there will be a nurse out there to get patient's medication set up. Due to COVID-19 they are going out physically every other week. She would like staff to make sure to communicate these changes to her as patient relies on the nursing staff for medication organization.     She was thankful for the help and will look forward to a return fax from Dr. Puente to best support the patient.

## 2021-06-09 NOTE — TELEPHONE ENCOUNTER
Problem: Physiological Stability  Goal: Vital signs and physical assessments stable and within expected parameters  Outcome: Outcome Met, Continue evaluating goal progress toward completion  Goal: Remains free of signs and symptoms of infection  Outcome: Outcome Met, Continue evaluating goal progress toward completion  Goal: Parent / caregiver verbalizes understanding of NICU care related to patient-specific condition and treatment  Description: Document on Patient Education Activity  Outcome: Outcome Met, Continue evaluating goal progress toward completion  Goal: Parent / caregiver verbalizes / demonstrates comfort with their ability to care for infant and familiarity with community resources prior to discharge  Description: Document on Patient Education Activity  Outcome: Outcome Met, Continue evaluating goal progress toward completion     Problem: Thermoregulation  Goal: Body temperature maintained within normal range  Outcome: Outcome Met, Continue evaluating goal progress toward completion     Problem: Pain  Goal: Acceptable level of comfort exhibited by infant (based on N-Pass/NIPS Scoring)  Outcome: Outcome Met, Continue evaluating goal progress toward completion     Problem: Oxygenation/Respiratory Function  Goal: Optimized respiratory function and gas exchange  Outcome: Outcome Met, Continue evaluating goal progress toward completion  Goal: Parent / caregiver verbalizes understanding of infant's respiratory condition and treatment  Description: Document on Patient Education Activity  Outcome: Outcome Met, Continue evaluating goal progress toward completion     Problem: Skin Integrity  Goal: Skin integrity is maintained or improved (skin will be intact without erythma or breakdown)  Outcome: Outcome Met, Continue evaluating goal progress toward completion     Problem: Alteration in Family Bonding  Goal: Family Interaction is supported  Outcome: Outcome Met, Continue evaluating goal progress toward  VEENA Hgaen RN with Dr. Puente's recommendations.    completion  Goal: Family demonstrates appropriate coping mechanisms  Outcome: Outcome Met, Continue evaluating goal progress toward completion     Problem: Nutrition  Goal: Tolerates feedings  Outcome: Outcome Met, Continue evaluating goal progress toward completion  Goal: Consumes sufficient dietary intake without complications  Outcome: Outcome Met, Continue evaluating goal progress toward completion  Goal: Progresses toward ability to receive all feeding from breast or bottle  Outcome: Outcome Met, Continue evaluating goal progress toward completion  Goal: Achieves catch up weight gain and growth consistent with birth weight percentile  Outcome: Outcome Met, Continue evaluating goal progress toward completion  Goal: Parent/ caregiver verbalizes understanding of milk preparation,  storage and bottle feeding techniques  Description: Document on Patient Education Activity  Outcome: Outcome Met, Continue evaluating goal progress toward completion     Problem: Breastfeeding  Goal: Breast milk supply is established and maintained to provide breast milk to infant in accordance with mother's preference  Outcome: Outcome Met, Continue evaluating goal progress toward completion  Goal: Successful breastfeeding as evidenced by proper latch and adequate suck/ swallow  Outcome: Outcome Met, Continue evaluating goal progress toward completion  Goal: Parent / caregiver verbalizes understanding of benefits of exclusive breastfeeding and breastfeeding techniques  Description: Document on Patient Education Activity  Outcome: Outcome Met, Continue evaluating goal progress toward completion     Problem: Risk of altered growth and development secondary to gestational age or condition  Goal: Demonstrates improved/ appropriate age-corrected neurobehavioral competence at time of discharge or will be referred for ongoing therapy  Outcome: Outcome Met, Continue evaluating goal progress toward completion  Goal: Parent / caregiver verbalizes  understanding of developmentally appropriate interaction & environment  Description: Document on Patient Education Activity  Outcome: Outcome Met, Continue evaluating goal progress toward completion  Goal: Parent / caregiver verbalizes understanding of risk for RSV and prevention  Description: Document on Patient Education Activity  Outcome: Outcome Met, Continue evaluating goal progress toward completion     Problem: Hyperbilirubinemia Requiring Phototherapy  Goal: Infant will receive sufficient treatment to produce a decrease in total serum bilirubin  Outcome: Outcome Met, Continue evaluating goal progress toward completion  Goal: Parent/ caregiver will verbalize understanding of hyperbilirubinemia and treatment  Description: Document on Patient Education Activity  Outcome: Outcome Met, Continue evaluating goal progress toward completion

## 2021-06-09 NOTE — TELEPHONE ENCOUNTER
vd call from [facility] nurse Xiao @ [phone] can l/m   Calling re: recent med change.     QUEtiapine (SEROQUEL) 100 MG tablet  180 tablet  1  6/30/2020   No    Sig - Route: Take 1-2 tablets (100-200 mg total) by mouth at bedtime. - Oral    Sent to pharmacy as: QUEtiapine 100 mg tablet (SEROquel)    E-Prescribing Status: Receipt confirmed by pharmacy (6/30/2020 12:31 PM CDT)     She is just looking for direction and how to best support client with this change

## 2021-06-09 NOTE — TELEPHONE ENCOUNTER
Returned calls to Xiao, patient's nurse and read out plan per Dr. Puente's note to her. She was concern of the changes made as she did not know pt. Topamax was dc and she wants patient's pharmacy to be informed as well as info from provider stating why med changes were made and if it is a taper off so that she can best support patient through the process.     Xiao requested AVS be fax, staff faxed AVS using fax # provided by Xiao-[phone].    Does provider want patient to immediately stop taking Topamax.

## 2021-06-09 NOTE — TELEPHONE ENCOUNTER
ANTICOAGULATION  MANAGEMENT    Assessment     Today's INR result of 1.70 is Subtherapeutic (goal INR of 2.0-3.0)        Warfarin taken as previously instructed    No new diet changes affecting INR    - Valentina reported finishing all her cans of ENSURE.    No new medication/supplements affecting INR    Continues to tolerate warfarin with no reported s/s of bleeding or thromboembolism     Previous INR was Subtherapeutic at 1.60 on 6/29/20. (due to patient had started drinking Ensure protein drinks 1-2 cans per day, and did not mention these changes to [facility].)    Plan:     Spoke with Valentina regarding INR result and instructed:    1.  Informed Valentina, I stressed the importance of checking her INR on 7/27/20, due to the increase in her wkly warfarin dose.  (she frequently cancels or no shows)    Warfarin Dosing Instructions:  (evenings. Has 5mg tabs)   - Change warfarin dose to 7.5 mg daily on Mon/Wed/Fri; and 10 mg daily rest of week     - (8.7 % change)    Instructed patient to follow up no later than:  7-10 days.   - INR scheduled on 7/27/20 @ [facility].    Education provided: importance of consistent vitamin K intake, impact of vitamin K foods on INR, target INR goal and significance of current INR result, monitoring for bleeding signs and symptoms and when to seek medical attention/emergency care    Valentina verbalizes understanding and agrees to warfarin dosing plan.    Instructed to call the [facility] for any changes, questions or concerns. (#[phone])   ?   Shonna Ernandez RN    Subjective/Objective:      Valentina Olmedo, a 60 y.o. female is on warfarin.     Valentina reports:     Home warfarin dose: as updated on anticoagulation calendar per template     Missed doses: No     Medication changes:  No     S/S of bleeding or thromboembolism:  No     New Injury or illness:  No     Changes in diet or alcohol consumption:  Yes:  Reported finishing up all the cans of ENSURE protein drinks in the last 3 days.     Upcoming surgery, procedure  or cardioversion:  No    Anticoagulation Episode Summary     Current INR goal:   2.0-3.0   TTR:   39.0 % (1 y)   Next INR check:   7/24/2020   INR from last check:   1.70! (7/14/2020)   Weekly max warfarin dose:      Target end date:      INR check location:      Preferred lab:      Send INR reminders to:   [facility]    Indications    Pulmonary embolism with infarction (H) [I26.99]           Comments:            Anticoagulation Care Providers     Provider Role Specialty Phone number    Donald Rooney MD Referring Internal Medicine [phone]

## 2021-06-09 NOTE — PROGRESS NOTES
Office Visit - Follow Up   Valentina Olmedo   57 y.o. female    Date of Visit: 3/1/2017    Chief Complaint   Patient presents with     Hypertension        Assessment and Plan   1. Anxiety disorder, unspecified type  Continue management per her psychiatrist, I did write a note for her cats    2. Nonintractable epilepsy without status epilepticus, unspecified epilepsy type  Stable, continue current medications follow up with neurology per protocol    3. Chronic kidney disease (CKD), stage 3 (moderate)  She is on losartan, continue  - HM2(CBC w/o Differential)  - Comprehensive Metabolic Panel    4. Nicotine dependence, cigarettes, with other nicotine-induced disorders  Smoking cessation advised    5. Essential hypertension  Blood pressure control continue current medication    6. Hypothyroidism due to acquired atrophy of thyroid  Continue levothyroxine  - Thyroid Stimulating Hormone (TSH)    7. Mixed hyperlipidemia  Continue atorvastatin  - Lipid Cascade    The following high BMI interventions were performed this visit: encouragement to exercise and lifestyle education regarding diet    Return in about 4 weeks (around 3/29/2017) for recheck.     History of Present Illness   This 57 y.o. old woman comes in with her  for follow-up of numerous medical problems.  Generally she has been doing okay.  She continues to meet with her psychiatrist.  She is planning to move into a new apartment in September and would like to bring her cats which help with her anxiety and depression.  She needs a note to state that she can bring her cats.  Her psychiatrist did write a note.  She would like a second note.  Overall, she is feeling okay.  No lightheadedness dizziness chest pain shortness of breath.  No nausea vomiting or diarrhea.  She has lost a few pounds.     Review of Systems: A comprehensive review of systems was negative except as noted.     Medications, Allergies and Problem List   Reviewed and updated     Physical Exam  "  General Appearance:   No acute distress    Visit Vitals     /86 (Patient Site: Left Arm, Patient Position: Sitting, Cuff Size: Adult Regular)     Pulse 90     Ht 5' 6\" (1.676 m)     Wt (!) 302 lb (137 kg)     SpO2 98%     BMI 48.74 kg/m2       HEENT exam is unremarkable, neck supple, no cervical lymphadenopathy, cardiovascular regular rate and rhythm no murmur gallop or rub, lungs are clear to auscultation bilaterally, abdomen obese soft nontender nondistended no organomegaly, neurologic exam is nonfocal, psychiatric pleasant, no confusion or agitation        Additional Information   Current Outpatient Prescriptions   Medication Sig Dispense Refill     albuterol (PROVENTIL HFA;VENTOLIN HFA) 90 mcg/actuation inhaler Inhale 2 puffs every 4 (four) hours as needed for wheezing. 1 Inhaler 6     atorvastatin (LIPITOR) 20 MG tablet Take 1 tablet by mouth every evening for cholesterol 30 tablet 0     benztropine (COGENTIN) 1 MG tablet Take 1 tablet (1 mg total) by mouth bedtime. 30 tablet 11     carBAMazepine (TEGRETOL) 100 mg chewable tablet Take 3 tablets by mouth twice a day (generic. Tegretol) 180 tablet 0     carBAMazepine (TEGRETOL) 200 mg tablet Take 2 tablets by mouth every morning with breakfast (generic. Tegret 60 tablet 0     diphenhydrAMINE (BENADRYL) 50 MG tablet Take 1 tablet (50 mg total) by mouth 2 (two) times a day as needed for sleep (anxiety or agitation). 60 tablet 11     DULoxetine (CYMBALTA) 60 MG capsule Take 2 capsules (120 mg total) by mouth daily. 60 capsule 11     EPINEPHrine 0.15 mg/0.15 mL atIn Inject 1 each into the shoulder, thigh, or buttocks.       ethotoin (PEGANONE) 250 mg Tab Take 1,000 mg by mouth 3 (three) times a day.       lansoprazole (PREVACID) 30 MG capsule Take 1 capsule (30 mg total) by mouth 2 (two) times a day. 180 capsule 3     levothyroxine (SYNTHROID, LEVOTHROID) 175 MCG tablet Take 1 tablet every day 90 tablet 3     losartan (COZAAR) 50 MG tablet Take 1 tablet by " mouth daily 30 tablet 6     naproxen (NAPROSYN) 500 MG tablet Take 1 tablet (500 mg total) by mouth 2 (two) times a day as needed. 60 tablet 2     nicotine (NICOTROL) 10 mg inhaler Inhale 1 puff as needed for smoking cessation. 42 each 2     nystatin (MYCOSTATIN) cream Apply topically.       omeprazole (PRILOSEC) 20 MG capsule        polyethylene glycol (MIRALAX) 17 gram packet Take 17 g by mouth daily.        QUEtiapine (SEROQUEL) 200 MG tablet Take 1 tablet (200 mg total) by mouth bedtime. 30 tablet 11     QUEtiapine (SEROQUEL) 50 MG tablet 100mg q am, 50mg at noon 90 tablet 11     risperiDONE (RISPERDAL) 3 MG tablet Take 1 tablet (3 mg total) by mouth bedtime. 30 tablet 11     SENNA LAX 8.6 mg tablet Use as directed 100 tablet PRN     traZODone (DESYREL) 100 MG tablet Take 2 tablets (200 mg total) by mouth bedtime. 60 tablet 11     VITAMIN D3 2,000 unit cap Take 1 capsule (2,000 units total) by mouth daily (vitamin d3) 90 each 3     No current facility-administered medications for this visit.      Allergies   Allergen Reactions     Aspirin (Tartrazine Only) Shortness Of Breath     Can have asa as long as no yellow dye     Gabapentin Rash     Guilherme Johnsons rash     Venom-Honey Bee Anaphylaxis     Vistaril [Hydroxyzine Pamoate] Other (See Comments)     Excessive sedation     Yellow Dye Shortness Of Breath     Linked only to tartazine.     Hydroxyzine Other (See Comments)     Sluggish; unable to wake up     Latex Itching     Other Environmental Allergy      Dust, Mold, Pollen     Phenobarbital Other (See Comments)     unknown     Latex Itching     Social History   Substance Use Topics     Smoking status: Former Smoker     Packs/day: 0.50     Years: 35.00     Types: Cigarettes     Start date: 6/10/1974     Smokeless tobacco: Never Used      Comment: tried chanitx and patches in past     Alcohol use No          Donald Rooney MD

## 2021-06-10 NOTE — TELEPHONE ENCOUNTER
JENNIFER. Orders to be placed and sent to you to sign.    Needs home health with PT and skilled nursing orders placed. The caller was a mental health nurse assigned by her mental health doctor. She has health issues that need to be addressed by another agency.    Kindra Gonzalez CMA      Looking into patient's chart, she will need to be seen for orders to be approved. I will contact caller.    Kindra Gonzalez CMA    Called and left message with Xiao, the caller and let her know for us to move forward, patient will need to be seen.    Kindra Gonzalez CMA

## 2021-06-10 NOTE — TELEPHONE ENCOUNTER
ANTICOAGULATION  MANAGEMENT    Assessment     Today's INR result of 2.2 is Therapeutic (goal INR of 2.0-3.0)        Warfarin taken as previously instructed    No new diet changes affecting INR    No new medication/supplements affecting INR    Continues to tolerate warfarin with no reported s/s of bleeding or thromboembolism     Previous INR was Subtherapeutic    Plan:     Spoke with Valentina regarding INR result and instructed:     Warfarin Dosing Instructions:  Continue current warfarin dose 7.5 mg daily on mon/wed/fri; and 10 mg daily rest of week  (0 % change)    Instructed patient to follow up no later than: two weeks      Valentina verbalizes understanding and agrees to warfarin dosing plan.    Instructed to call the [facility] for any changes, questions or concerns. (#[phone])   ?   Gustavo Gamez RN    Subjective/Objective:      Valentina Olmedo, a 60 y.o. female is on warfarin.     Valentina reports:     Home warfarin dose: as updated on anticoagulation calendar per template     Missed doses: No     Medication changes:  No     S/S of bleeding or thromboembolism:  No     New Injury or illness:  No     Changes in diet or alcohol consumption:  No     Upcoming surgery, procedure or cardioversion:  No    Anticoagulation Episode Summary     Current INR goal:   2.0-3.0   TTR:   39.7 % (1 y)   Next INR check:   8/10/2020   INR from last check:   2.20 (7/27/2020)   Weekly max warfarin dose:      Target end date:      INR check location:      Preferred lab:      Send INR reminders to:   [facility]    Indications    Pulmonary embolism with infarction (H) [I26.99]           Comments:            Anticoagulation Care Providers     Provider Role Specialty Phone number    Donald Rooney MD Referring Internal Medicine [phone]

## 2021-06-10 NOTE — TELEPHONE ENCOUNTER
Provider reviewed and signed.      Writer faxed back to Xiao at [employer] faxed [phone]    Sent originals down to scanning. Confirmation was Successful transmission.

## 2021-06-10 NOTE — PROGRESS NOTES
"Encounter opened in error.  Patient is no-show for appt today:  Patient is late cancel for her psychotherapy appt today.  Called patient at home to inquire about her status, patient says she canceled because \"I'm having trouble with speech.\"  She says she doesn't know why she is having that difficulty.  She is not aware of being over-medicated, and states it is not because of she didn't want to come for care with writer today.  Patient was encouraged to see her doctor if she perceives 'speech problem' ongoing.  She states she will be at next appointment 6/22.  Declines re-schedule - she feels that she will be OK without additional appt before then because she is able to accompany spouse to his therapist.  Patient does show limited and delayed speech on telephone conversation today but this is pretty much baseline for her.    Loretta Barney, BELKIS  "

## 2021-06-10 NOTE — TELEPHONE ENCOUNTER
----- Message from Reema Wallis CNP sent at 8/17/2020  9:53 AM CDT -----  Please call patient and notify her that her lumbar MRI was reviewed.  The compression fracture L1 and L2 are stable and fully healed, no concerning findings.  L5 is also stable since CT scan in March with small amount of inflammation within the bone showing that it is still healing however there is no concerning findings and the fractures are very stable.  Her spine fractures are not the reason for her chronic immobility.  No further intervention required.  Recommend continuing home physical therapy for mobility and she can be up and moving as much as possible at this time as tolerated.  If her back pain is still significant I would recommend a follow-up in clinic since I have not seen her since January at initial evaluation only.  40-minute visit please if she wants to follow-up in clinic.

## 2021-06-10 NOTE — PROGRESS NOTES
"Mental Health Visit Note    5/11/2017    Start time: 1pm    Stop Time: 1:55pm   Session # 4    Valentina Olmedo is a 57 y.o. female is being seen today for    Chief Complaint   Patient presents with      Follow Up     Anxiety     Depression   .     New symptoms or complaints: Some increase in anxiety in the context of health problems and upcoming move    Functional Impairment:   Personal: 3    Family: 3    Work: 4    Social: 3        Clinical assessment of mental status: Patient's grooming is Well groomed, her attire is Appropriate, and her age appears Appears Stated. Behavior towards examiner is Sullen, motor activity is Within normal, and eye contact is Staring. Patient's mood is Irritable and anxious, and affect is flattened but congruent w/content of speech. Speech/language is Delayed/Hesitant, attention is Distractible, and concentration is Brief. Thought process is Slow, thought content is free of hallucinations or delusions. Patient's orientation is X 3, memory is Impairment, Recent and Remote, judgement is Minimal, and estimated intelligence is Below Average. Demonstrated insight is Diminished while fund of knowledge is adequate.      Suicidal/Homicidal Ideation present: None Reported This Session    Patient's impression of their current status:      Patient comes for session after 5+ week absence, states she was in the hospital for pulmonary blood clots. Spouse's income does not start until late June, states they are trying to be frugal until that time.  They did go into the cell store to explore cheaper phone, learned that they owe money for the 'free' phones.   They looked at one apartment \"It was a dump and we didn't even ask how much it is.\"  They have spent the savings they had - apparently despite not paying rent they spend more than patient's income.    Processed negative cognitions, reinforced strengths, validated patient efforts. Discussed value of knowing more about housing market going forward to " help her with anxiety about same.  We discussed value of being more assertive with looking and asking for prices for apartments so that they can compare apples and apples.     Patient agrees to go with spouse to visit a couple apartments, ask about rent. Will continue with her efforts to walk.    Therapist impression of patients current state: Patient shows very minimal skills in managing her affairs but is holding her own in community without ED or inpatient MH visits.    Type of psychotherapeutic technique provided: Client centered, Solution-focused and CBT    Progress toward short term goals:Progress as expected, she did go to phone store to explore situation with telephone contract.  She is trying to walk a couple times/week at stores.    Review of long term goals: Not done at today's visit and Date of last review 3/30/17    Diagnosis:   1. Generalized anxiety disorder    2. Depression, major, recurrent, moderate    3. Borderline personality disorder    4. Chronic post-traumatic stress disorder (PTSD)        Plan and Follow up: Patient will return in two weeks for follow up.  Patient agrees to go with spouse to visit a couple apartments, ask about rent. Will continue with her efforts to walk.    Discharge Criteria/Planning: Client has chronic symptoms and ongoing therapy for maintenance stability recommended.    Loretta Barney MSW, LICSW  5/11/2017      This note was created with help of Dragon dictation software. Grammatical / typing errors are not intentional and inherent to the software.

## 2021-06-10 NOTE — TELEPHONE ENCOUNTER
Phone call to patient's home health nurse Xiao. Did explain the order had been placed for PT. Also explained message was received that in home PT needed. See other phone note ofr more information.    Xiao will be reaching out to patient's PCP.

## 2021-06-10 NOTE — TELEPHONE ENCOUNTER
Home health certification and plan of care form faxed to [facility]. Copy placed in provider mailbox and copy original send to scanning.

## 2021-06-10 NOTE — TELEPHONE ENCOUNTER
Orders being requested: Home Care  Skilled Nursing    1 time a week for 12 weeks.  Physical Therapy -must be with outside source as [facility] does not have PT services.    Evaluate and treat  Reason service is needed/diagnosis: Medication management, Dx education and mental health assessment.    When are orders needed by: ASAP  Where to send Orders: Phone:  verbal orders to caller  Okay to leave detailed message?  Yes

## 2021-06-10 NOTE — PROGRESS NOTES
"Patient is late cancel for her psychotherapy appt today.  Called patient at home to inquire about her status, patient says she canceled because \"I'm having trouble with speech.\"  She says she doesn't know why she is having that difficulty.  She is not aware of being over-medicated, and states it is not because of she didn't want to come for care with writer today.  Patient was encouraged to see her doctor if she perceives 'speech problem' ongoing.  She states she will be at next appointment 6/22.   Patient does show limited and delayed speech on telephone conversation today but this is pretty much baseline for her.    Loretta Barney, BELKIS  "

## 2021-06-10 NOTE — TELEPHONE ENCOUNTER
Xiao Hernandez called and left a . She would like to know who to send the PT referral to. [facility] is not going to work due to her insurance. Looks like all her providers are with [facility] only. She would need a skilled nursing PT with [facility]. Her current order is for skilled care home health nursing which was order by her physiatric provider. She pay need to be home bound by PCP per Xiao.

## 2021-06-10 NOTE — TELEPHONE ENCOUNTER
Phone call from patient home health coordinator RN in regards to MRI results. Results given and explained. Discussed patient continuing with home PT and up out of bed as much as possible. Xiao reports PT was going to be ordered dependent on the MRI results; patient has not been doing any in-home PT up to this point. She will find out name of company to send an order for PT to and call back with that information.

## 2021-06-10 NOTE — TELEPHONE ENCOUNTER
ANTICOAGULATION  MANAGEMENT    Assessment     Today's INR result of 2.30 is Therapeutic (goal INR of 2.0-3.0)        Warfarin taken as previously instructed    No new diet changes affecting INR    No new medication/supplements affecting INR    Continues to tolerate warfarin with no reported s/s of bleeding or thromboembolism     Previous INR was Therapeutic at 2.20 on 7/27/20.    Plan:     Spoke on phone with Valentina  regarding INR result and instructed:    1.  Also called Xiao RN Case Mgr with [facility] - [phone].    Warfarin Dosing Instructions:    - Continue current warfarin dose 7.5 mg daily on Mon/Wed/Fri; and 10 mg daily rest of week.    Instructed patient to follow up no later than:  3-4 wks.    Education provided: importance of consistent vitamin K intake, target INR goal and significance of current INR result and importance of notifying clinic for changes in medications    Yuval, RAJEEV verbalizes understanding and agrees to warfarin dosing plan.    Instructed to call the [facility] for any changes, questions or concerns. (#[phone])   ?   Shonna Ernandez RN    Subjective/Objective:      Valentina Olmedo, a 60 y.o. female is on warfarin.     Valentina reports:     Home warfarin dose: as updated on anticoagulation calendar per template     Missed doses: No     Medication changes:  No     S/S of bleeding or thromboembolism:  No     New Injury or illness:  No     Changes in diet or alcohol consumption:  No     Upcoming surgery, procedure or cardioversion:  No    Anticoagulation Episode Summary     Current INR goal:   2.0-3.0   TTR:   39.7 % (1 y)   Next INR check:   8/31/2020   INR from last check:   2.30 (8/10/2020)   Weekly max warfarin dose:      Target end date:      INR check location:      Preferred lab:      Send INR reminders to:   [facility]    Indications    Pulmonary embolism with infarction (H) [I26.99]           Comments:            Anticoagulation Care Providers      Provider Role Specialty Phone number    Donald Rooney MD Referring Internal Medicine [phone]

## 2021-06-10 NOTE — TELEPHONE ENCOUNTER
Phone call to Xiao, patient care coordinator for  in home nursing services. Discussed her message of patient meeting criteria for home-bound status and needing skilled nursing care and home PT. Explained this would need to come through the patient's PCP. Stated understanding. She will reach out to Dr. Rooney.

## 2021-06-10 NOTE — PROGRESS NOTES
"  Office Visit - Follow Up   Valentina Olmedo   57 y.o. female    Date of Visit: 4/27/2017    Chief Complaint   Patient presents with     Headache     Hypertension        Assessment and Plan   1. Pulmonary embolism with infarction  Continue warfarin, dosing per anticoagulation team  - INR    2. Chronic nonintractable headache, unspecified headache type  This is chronic and unchanged, continue current plan    3. Anxiety disorder, unspecified type  This is much better managed, per psychiatry continue current medications therapy    4. Non morbid obesity due to excess calories  The following high BMI interventions were performed this visit: encouragement to exercise and lifestyle education regarding diet    Return in about 4 weeks (around 5/25/2017) for recheck.     History of Present Illness   This 57 y.o. old woman comes in for follow-up of pulmonary embolism, chronic headache syndrome, severe anxiety, obesity.  Her INR is been a bit low.  She states she is not missing any doses.  She seemed increased dosing from our anticoagulation program.  She is due for an INR check today.  She has had a slight headache which is chronic for her.  He denies severe headache, weakness numbness vision changes speech changes.  Her anxiety is really been well-controlled.  She is not exercising much.    Review of Systems: A comprehensive review of systems was negative except as noted.     Medications, Allergies and Problem List   Reviewed and updated     Physical Exam   General Appearance:   No acute distress    /88 (Patient Site: Right Arm, Patient Position: Sitting, Cuff Size: Adult Regular)  Pulse 81  Ht 5' 6\" (1.676 m)  Wt (!) 308 lb (139.7 kg)  SpO2 97%  BMI 49.71 kg/m2    HEENT exam is unremarkable, neck supple, no cervical lymphadenopathy, cardiovascular regular rate and rhythm no murmur gallop or rub, lungs are clear to auscultation bilaterally, abdomen soft nontender nondistended no organomegaly, neurologic exam is " nonfocal, psychiatric pleasant, flat affect, no confusion or agitation        Additional Information   Current Outpatient Prescriptions   Medication Sig Dispense Refill     albuterol (PROVENTIL HFA;VENTOLIN HFA) 90 mcg/actuation inhaler Inhale 2 puffs every 4 (four) hours as needed for wheezing. 1 Inhaler 6     atorvastatin (LIPITOR) 20 MG tablet Take 1 tablet by mouth every evening for cholesterol 30 tablet 0     carBAMazepine (TEGRETOL) 100 mg chewable tablet Chew 300 mg 2 (two) times a day. In AFTERNOON and BEDTIME       carBAMazepine (TEGRETOL) 200 mg tablet Take 2 tablets by mouth every morning with breakfast (generic. Tegret 60 tablet 0     diphenhydrAMINE (BENADRYL) 50 MG tablet Take 1 tablet (50 mg total) by mouth 2 (two) times a day as needed for sleep (anxiety or agitation). 60 tablet 11     DULoxetine (CYMBALTA) 60 MG capsule Take 2 capsules (120 mg total) by mouth daily. 60 capsule 11     enoxaparin (LOVENOX) 300 mg/3 mL Soln Inject 1.4 mL (140 mg total) under the skin every 12 (twelve) hours. 6 mL 0     EPINEPHrine 0.15 mg/0.15 mL atIn Inject 1 each into the shoulder, thigh, or buttocks.       ethotoin (PEGANONE) 250 mg Tab Take 1,000 mg by mouth 3 (three) times a day.       levothyroxine (SYNTHROID, LEVOTHROID) 175 MCG tablet Take 175 mcg by mouth Daily at 6:00 am.       losartan (COZAAR) 50 MG tablet Take 50 mg by mouth daily.       omeprazole (PRILOSEC) 20 MG capsule Take 20 mg by mouth Daily before breakfast.       polyethylene glycol (MIRALAX) 17 gram packet Take 17 g by mouth daily as needed.        QUEtiapine (SEROQUEL) 200 MG tablet Take 1 tablet (200 mg total) by mouth bedtime. 30 tablet 11     QUEtiapine (SEROQUEL) 50 MG tablet Take 100 mg by mouth every morning.       risperiDONE (RISPERDAL) 3 MG tablet Take 1 tablet (3 mg total) by mouth bedtime. 30 tablet 11     senna (SENOKOT) 8.6 mg tablet Take 2 tablets by mouth 2 (two) times a day.       traZODone (DESYREL) 100 MG tablet Take 2 tablets  (200 mg total) by mouth bedtime. 60 tablet 11     VITAMIN D3 2,000 unit cap Take 1 capsule (2,000 units total) by mouth daily (vitamin d3) 90 each 3     warfarin (COUMADIN) 5 MG tablet Take 1 tablet April 4 and 5 and then as directed by her physician 60 tablet 0     No current facility-administered medications for this visit.      Allergies   Allergen Reactions     Aspirin (Tartrazine Only) Shortness Of Breath     Can have asa as long as no yellow dye     Gabapentin Rash     Guilherme Johnsons rash     Venom-Honey Bee Anaphylaxis     Vistaril [Hydroxyzine Pamoate] Other (See Comments)     Excessive sedation     Yellow Dye Shortness Of Breath     Linked only to tartazine.     Hydroxyzine Other (See Comments)     Sluggish; unable to wake up     Latex Itching     Other Environmental Allergy      Dust, Mold, Pollen     Phenobarbital Other (See Comments)     unknown     Latex Itching     Social History   Substance Use Topics     Smoking status: Current Every Day Smoker     Packs/day: 0.50     Years: 35.00     Types: Cigarettes     Start date: 6/10/1974     Smokeless tobacco: Never Used      Comment: tried chanitx and patches in past     Alcohol use No            Donald Rooney MD

## 2021-06-10 NOTE — TELEPHONE ENCOUNTER
Xiao BOO, calling back requesting the PT order be sent to the [place] location of [facility]. Phone number for this site is [phone].

## 2021-06-10 NOTE — TELEPHONE ENCOUNTER
It is extremely difficult to determine her functioning with a telephone visit, last in person visit was in January, 2020.  Therefore at this time we cannot conclude anything without the updated MRI, and we may need to see her in clinic depending the results to be able to determine anything.

## 2021-06-10 NOTE — TELEPHONE ENCOUNTER
RN cannot approve Refill Request    RN can NOT refill this medication med is not covered by policy/route to provider. Last office visit: 3/13/2020 Donald Rooney MD Last Physical: 9/13/2018 Last MTM visit: Visit date not found Last visit same specialty: 3/13/2020 Donald Rooney MD.  Next visit within 3 mo: Visit date not found  Next physical within 3 mo: Visit date not found      Dasha Manning, [facility] Triage/Med Refill 8/18/2020    Requested Prescriptions   Pending Prescriptions Disp Refills     warfarin ANTICOAGULANT (COUMADIN/JANTOVEN) 5 MG tablet [Pharmacy Med Name: WARFARIN 5MG TAB] 56 tablet 0     Sig: TAKE 1 & 1/2 TABLETS - 2 TABLETS BY MOUTH DAILY AS DIRECTED       Warfarin Refill Protocol  Failed - 8/18/2020  2:20 PM        Failed -  Route to appropriate pool/provider     Last Anticoagulation Summary:   Anticoagulation Episode Summary     Current INR goal:   2.0-3.0   TTR:   38.4 % (11.9 mo)   Next INR check:   8/31/2020   INR from last check:   2.30 (8/10/2020)   Weekly max warfarin dose:      Target end date:      INR check location:      Preferred lab:      Send INR reminders to:   [facility]    Indications    Pulmonary embolism with infarction (H) [I26.99]           Comments:            Anticoagulation Care Providers     Provider Role Specialty Phone number    Donald Rooney MD Referring Internal Medicine [phone]                Passed - Provider visit in last year     Last office visit with prescriber/PCP: 3/13/2020 Donald Rooney MD OR same dept: 3/13/2020 Donald Rooney MD OR same specialty: 3/13/2020 Donald Rooney MD  Last physical: 9/13/2018 Last MTM visit: Visit date not found    Next appt within 3 mo: Visit date not found Next physical within 3 mo: Visit date not found  Prescriber OR PCP: Donald Rooney MD  Last diagnosis associated with med order: 1. Pulmonary embolism with infarction (H)  - warfarin  ANTICOAGULANT (COUMADIN/JANTOVEN) 5 MG tablet [Pharmacy Med Name: WARFARIN 5MG TAB]; TAKE 1 & 1/2 TABLETS - 2 TABLETS BY MOUTH DAILY AS DIRECTED  Dispense: 56 tablet; Refill: 0    2. Long term (current) use of anticoagulants  - warfarin ANTICOAGULANT (COUMADIN/JANTOVEN) 5 MG tablet [Pharmacy Med Name: WARFARIN 5MG TAB]; TAKE 1 & 1/2 TABLETS - 2 TABLETS BY MOUTH DAILY AS DIRECTED  Dispense: 56 tablet; Refill: 0    If protocol passes may refill for 6 months if within 3 months of last provider visit (or a total of 9 months).

## 2021-06-10 NOTE — TELEPHONE ENCOUNTER
RECEIVED HOME HEALTH 60-DAY RECERTIFICATION AND PLAN OF TREATMENT INCLUDING 60-DAY NURSING SUMMARY - PLACED IN PROVIDER'S CLINIC MAILBOX

## 2021-06-11 NOTE — TELEPHONE ENCOUNTER
COVID 19 Nurse Triage Plan/Patient Instructions    Please be aware that novel coronavirus (COVID-19) may be circulating in the community. If you develop symptoms such as fever, cough, or SOB or if you have concerns about the presence of another infection including coronavirus (COVID-19), please contact your health care provider or visit [email or web address].     Disposition/Instructions    ED Visit recommended. Follow protocol based instructions.      Bring Your Own Device:  Please also bring your smart device(s) (smart phones, tablets, laptops) and their charging cables for your personal use and to communicate with your care team during your visit.      Thank you for taking steps to prevent the spread of this virus.  o Limit your contact with others.  o Wear a simple mask to cover your cough.  o Wash your hands well and often.    Resources    [facility]: About COVID-19: [email or web address]    CDC: What to Do If You're Sick: www.cdc.gov/coronavirus/2019-ncov/about/steps-when-sick.html    CDC: Ending Home Isolation: www.cdc.gov/coronavirus/2019-ncov/hcp/disposition-in-home-patients.html     CDC: Caring for Someone: www.cdc.gov/coronavirus/2019-ncov/if-you-are-sick/care-for-someone.html     [facility]: Interim Guidance for Hospital Discharge to Home: www.health.[facility].mn.us/diseases/coronavirus/hcp/hospdischarge.pdf    [facility] ER clinical trials (COVID-19 research studies): clinicalaffairs.[facility]-clinical-trials     Below are the COVID-19 hotlines at the [facility] ([facility]). Interpreters are available.   o For health questions: Call [phone] or 1-329.190.5276 (7 a.m. to 7 p.m.)  o For questions about schools and childcare: Call 108-466-1668 or 1-508.196.9759 (7 a.m. to 7 p.m.)           RN triage   Call from pt   Pt states she needs to reschedule covid test -- scheduled for today   Pt states she had plugged ear for 3 weeks -- diggin in ear and then bleeding   Pt states she  was seen in ED yesterday -- and prescribed antibiotic   Pt states her ear is still bleeding - ' heavy'  Reviewed home care advice and when to go back to ED   Heather Contreras RN [facility] RN triage    Reason for Disposition    Bleeding won't stop after 10 minutes of direct pressure (using correct technique)    Protocols used: EAR INJURY-A-OH

## 2021-06-11 NOTE — TELEPHONE ENCOUNTER
ANTICOAGULATION  MANAGEMENT    Assessment     Today's INR result of 3.90 is Supratherapeutic (goal INR of 2.0-3.0)        Warfarin taken as previously instructed    reported less appetite may be affecting diet and INR    No new medication/supplements affecting INR    Continues to tolerate warfarin with no reported s/s of bleeding or thromboembolism     Previous INR was Therapeutic at 2.30 on 8/10/20.    Plan:     Spoke on phone with Valentina regarding INR result and instructed:   1,  Call Xiao RN Case Mgr with [facility] with INR and warfarin dosing.  [phone].   2.  Informed Valentina, not to skip meals.  Important to eat with warfarin therapy.    Warfarin Dosing Instructions:  (has 5mg tabs)   - tonight, take 5mg warfarin,    - then change warfarin dose to 10 mg daily on Sun/Thurs; and 7.5 mg daily rest of week.   - (change of 8%).    Instructed patient to follow up no later than:  1-2 wks.   - INR scheduled on 9/24/20 during f/u visit with Dr. Rooney.    Education provided: impact of vitamin K foods on INR, target INR goal and significance of current INR result and monitoring for bleeding signs and symptoms    Valentina verbalizes understanding and agrees to warfarin dosing plan.    Instructed to call the [facility] for any changes, questions or concerns. (#[phone])   ?   Shonna Ernandez RN    Subjective/Objective:      Valentina ORNELAS Honorio, a 60 y.o. female is on warfarin.     Valentina reports:     Home warfarin dose: as updated on anticoagulation calendar per template     Missed doses: No     Medication changes:  No     S/S of bleeding or thromboembolism:  No     New Injury or illness:  No     Changes in diet or alcohol consumption:  Yes:  Reported eating less.     Upcoming surgery, procedure or cardioversion:  No    Anticoagulation Episode Summary     Current INR goal:   2.0-3.0   TTR:   35.0 % (1 y)   Next INR check:   9/24/2020   INR from last check:   3.90! (9/10/2020)   Weekly max warfarin dose:      Target end  date:      INR check location:      Preferred lab:      [facility]    Indications    Pulmonary embolism with infarction (H) [I26.99]           Comments:            Anticoagulation Care Providers     Provider Role Specialty Phone number    Donald Rooney MD Referring Internal Medicine [phone]

## 2021-06-11 NOTE — PATIENT INSTRUCTIONS - HE
Prolia 1st when approved by your insurance. My nurse will call you.  Prolia 2nd in 6 months with my nurse. I will see you in 1 year.    Call Spine clinic and see when to start with PT/OT.    DXA in 1 year .   Phone number to schedule [phone].    Daily calcium need is 2683-3035 mg a day from the diet and supplements.  Calcium citrate is easier to digest.  Vitamin D 2000 IU daily recommended.

## 2021-06-11 NOTE — PROGRESS NOTES
Office Visit - Follow Up   Valentina Olmedo   60 y.o. female    Date of Visit: 9/24/2020    Chief Complaint   Patient presents with     Ear Pain     right        Assessment and Plan   1. Otorrhagia of right ear  She has bleeding from the right ear with a large clot that is obstructing any visualization.  I did not remove this due to concerns that I would provoke bleeding.  I have asked her to see ENT for definitive evaluation and management  - Ambulatory referral to ENT    2. Schizoaffective disorder, depressive type (H)  3. Anxiety disorder, unspecified type  Stable this is a face-to-face encounter.  This patient is homebound due to debility from chronic medical conditions and would benefit from skilled nursing, nursing aide, physical and Occupational Therapy    4. Nonintractable epilepsy without status epilepticus, unspecified epilepsy type (H)  Continue with current plan per epilepsy group    5. Essential hypertension with goal blood pressure less than 140/90  Blood pressure borderline, continue same check lab  - HM2(CBC w/o Differential)  - Comprehensive Metabolic Panel  - Thyroid Cascade    6. Pulmonary embolism with infarction (H)  Continue warfarin, she has held this for couple days we will recheck INR today    7. Morbid obesity with BMI of 45.0-49.9, adult (H)  The following high BMI interventions were performed this visit: encouragement to exercise and lifestyle education regarding diet    Return in about 4 weeks (around 10/22/2020) for recheck.     History of Present Illness   This 60 y.o. old woman comes in with her  for follow-up of recent ER visit.  She had hemorrhage which sounds like from the tympanic membrane.  Large clot was removed and I believe she is put on antibiotics.  She reports that the ear feels plugged now.  Her INR was a little bit elevated but not too bad 3.3.  She otherwise is doing okay.  She does have home care needs face-to-face for that.  Mood stable.  No recent seizures.   "Blood pressures been controlled.    Review of Systems: A comprehensive review of systems was negative except as noted.     Medications, Allergies and Problem List   Reviewed, reconciled and updated  Post Discharge Medication Reconciliation Status:      Physical Exam   General Appearance:   No acute distress    BP (!) 138/94 (Patient Site: Right Arm, Patient Position: Sitting, Cuff Size: Adult Regular)   Pulse 96   Ht 5' 6\" (1.676 m)   Wt (!) 337 lb (152.9 kg)   SpO2 98%   BMI 54.39 kg/m      HEENT exam is notable for a large clot in the right ear.  This is very firm.  I pushed a little bit on it to see if it would come out but I did not want to remove it and cause more bleeding.  Neck supple no thyromegaly or nodule palpable  Lymphatic no cervical lymphadenopathy  Cardiovascular regular rate and rhythm no murmur gallop or rub  Pulmonary lungs are clear to auscultation bilaterally  Gastrointestinal abdomen soft nontender nondistended no organomegaly  Neurologic exam is non focal  Psychiatric pleasant, no confusion or agitation        Additional Information   Current Outpatient Medications   Medication Sig Dispense Refill     albuterol (PROAIR HFA;PROVENTIL HFA;VENTOLIN HFA) 90 mcg/actuation inhaler Inhale 2 puffs every 4 (four) hours as needed for wheezing. 1 Inhaler 6     carBAMazepine (TEGRETOL) 200 mg tablet Take 300-400 mg by mouth see administration instructions. 2 tabs (400mg) q AM, 1.5 tabs (300mg) q PM and HS        cholecalciferol, vitamin D3, (VITAMIN D3) 50 mcg (2,000 unit) capsule Take 1 capsule (2,000 Units total) by mouth daily. 30 capsule 11     DULoxetine (CYMBALTA) 60 MG capsule Take 2 capsules (120 mg total) by mouth daily. 180 capsule 1     EPINEPHrine 0.15 mg/0.15 mL atIn Inject 1 each into the shoulder, thigh, or buttocks.       ethotoin (PEGANONE) 250 mg Tab Take 1,000 mg by mouth 3 (three) times a day.       hydrOXYzine pamoate (VISTARIL) 25 MG capsule Take 1 capsule (25 mg total) by mouth " 2 (two) times a day as needed for anxiety. 60 capsule 2     levothyroxine (SYNTHROID, LEVOTHROID) 200 MCG tablet TAKE 1 TABLET BY MOUTH DAILY 28 tablet 11     melatonin 3 mg Tab tablet Take 1 tablet (3 mg total) by mouth at bedtime as needed. 90 tablet 1     olopatadine (PATANOL) 0.1 % ophthalmic solution Administer 1 drop to both eyes 2 (two) times a day. 5 mL 1     omeprazole (PRILOSEC) 20 MG capsule TAKE 1 CAPSULE BY MOUTH DAILY BEFORE BREAKFAST 28 capsule 11     oxybutynin (DITROPAN XL) 10 MG ER tablet TAKE 1 TABLET BY MOUTH DAILY 28 tablet 11     QUEtiapine (SEROQUEL) 100 MG tablet Take 1-2 tablets (100-200 mg total) by mouth at bedtime. 180 tablet 1     rosuvastatin (CRESTOR) 20 MG tablet TAKE 1 TABLET BY MOUTH DAILY 28 tablet 11     SENNA 8.6 mg tablet TAKE 2 TABLETS (17.2MG) BY MOUTH TWICE A  tablet 3     warfarin ANTICOAGULANT (COUMADIN/JANTOVEN) 5 MG tablet Take 1 &1/2 tablets (7.5mg) to 2 tablets (10mg) by mouth daily, as directed.  Adjust dose based on INR results. 165 tablet 1     Current Facility-Administered Medications   Medication Dose Route Frequency Provider Last Rate Last Dose     polymyxin B-trimethoprim 10,000 unit- 1 mg/mL ophthalmic drops 1 drop (for POLYTRIM)  1 drop Both Eyes Tesfaye Caballero MD         Allergies   Allergen Reactions     Aspirin (Tartrazine Only) Shortness Of Breath     Can have asa as long as no yellow dye     Gabapentin Rash     Guilherme Johnsons rash     Venom-Honey Bee Anaphylaxis     Vistaril [Hydroxyzine Pamoate] Other (See Comments)     Excessive sedation     Yellow Dye Shortness Of Breath     Linked only to tartazine.     Hydroxyzine Other (See Comments)     Sluggish; unable to wake up     Latex Itching     Other Environmental Allergy      Dust, Mold, Pollen     Phenobarbital Other (See Comments)     unknown     Latex Itching     Social History     Tobacco Use     Smoking status: Current Some Day Smoker     Packs/day: 0.25     Years: 35.00     Pack years:  8.75     Types: Cigarettes     Start date: 6/10/1974     Smokeless tobacco: Never Used     Tobacco comment: smoking 1-2 a day   Substance Use Topics     Alcohol use: No     Drug use: No       Review and/or order of clinical lab tests:  Review and/or order of radiology tests:  Review and/or order of medicine tests:  Discussion of test results with performing physician:  Decision to obtain old records and/or obtain history from someone other than the patient:  Review and summarization of old records and/or obtaining history from someone other than the patient and.or discussion of case with another health care provider:  Independent visualization of image, tracing or specimen itself:    Time:      Donald Rooney MD

## 2021-06-11 NOTE — TELEPHONE ENCOUNTER
ANTICOAGULATION  MANAGEMENT    Assessment     Today's INR result of 2.60 is Therapeutic (goal INR of 2.0-3.0)        Warfarin taken as previously instructed    No new diet changes affecting INR    Potential interaction between Ciprofloxacin / Dexamethasone and warfarin which may affect subsequent INRs    - 9/28/20 new RX Cipro/Dexamethasone instill 4 drops both ears daily, for 7 days, from 9/28-10/5/20.    Continues to tolerate warfarin with no reported s/s of bleeding or thromboembolism     Previous INR was Therapeutic at 2.40 on 9/24/20.    Consultation today with ENT re:  Bleeding right ear.    Plan:     Spoke on phone with  regarding INR result and instructed:   1.  Called and spoke with Xiao RN Case Mgr with [facility] with INR and warfarin dosing.  [phone]   2.  Valentina has a new RN Case Mgr - Sarah  ([phone]).   3.  Stressed the importance with Valentina to ensure she keeps her INR appt on 10/2/20.    Warfarin Dosing Instructions:  (has 5mg tabs)   - Continue current warfarin dose 10 mg daily on Sun/Thurs; and 7.5 mg daily rest of week.    Instructed patient to follow up no later than:  4-5 days.   - INR scheduled on 10/2/20 @ [facility].    Education provided: importance of consistent vitamin K intake, target INR goal and significance of current INR result, potential interaction between warfarin and Cipro/Dexamethaone ear drops and importance of notifying clinic for changes in medications    Valentina verbalizes understanding and agrees to warfarin dosing plan.    Instructed to call the [facility] for any changes, questions or concerns. (#[phone])   ?   Shonna Ernandez RN    Subjective/Objective:      Valentina JOHNSON Olmedo, a 60 y.o. female is on warfarin.     Valentina reports:     Home warfarin dose: as updated on anticoagulation calendar per template     Missed doses: No     Medication changes:  Yes:  ABX with Cipro/Dexamethasone ear drops.   - per [employer], Concurrent use of CIPROFLOXACIN and WARFARIN may  result in a major increased risk of bleeding.   - per [employer], Concurrent use of DEXAMETHASONE and WARFARIN may result in increased risk of bleeding or diminished effects of warfarin.     S/S of bleeding or thromboembolism:  Yes:  Her right ear felt plugged and she used her finger to clear the ear, and it started to bleed.     New Injury or illness:  Yes:  Bleeding right ear.     Changes in diet or alcohol consumption:  No     Upcoming surgery, procedure or cardioversion:  No    Anticoagulation Episode Summary     Current INR goal:   2.0-3.0   TTR:   32.7 % (1 y)   Next INR check:   10/2/2020   INR from last check:   2.60 (9/28/2020)   Weekly max warfarin dose:      Target end date:      INR check location:      Preferred lab:      Send INR reminders to:   [facility]    Indications    Pulmonary embolism with infarction (H) [I26.99]           Comments:            Anticoagulation Care Providers     Provider Role Specialty Phone number    Donald Rooney MD Referring Internal Medicine [phone]

## 2021-06-11 NOTE — PROGRESS NOTES
HISTORY OF PRESENT ILLNESS  Asked to see by Dr. Rooney for evaluation of right ear pain. Patient reports that her right ear is plugged with a blood clot. She reports that she had an ear infection and used her finger to clear the ear. She started bleeding. She is on blood thinners. She reports that she feels that her hearing is down in the right ear.     REVIEW OF SYSTEMS  Review of Systems: a 10-system review was performed. Pertinent positives are noted in the HPI and on a separate scanned document in the chart.    PMH, PSH, FH and SH has documented in the EHR.      EXAM    CONSTITUTIONAL  General Appearance:  Normal, well developed, well nourished, no obvious distress  Ability to Communicate:  communicates appropriately.    HEAD AND FACE  Appearance and Symmetry:  Normal, no scalp or facial scarring or suspicious lesions.    EARS  Clinical speech reception threshold:  Normal, able to hear normal speech.  Auricle:  Normal, Auricles without scars, lesions, masses.  External auditory canal:  Bilateral cerumen impaction debrided under otomicroscopy using microdissection technique. In addition the patient had dried blood on the right as well as well which was removed. After removal there was some minor bleeding. In addition there was purulent secretions and granulation in the external auditory canal  Tympanic membrane:  Normal, Tympanic membranes normal without swelling or erythema.    NOSE (speculum or scope)  Architecture:  Normal, Grossly normal external nasal architecture with no masses or lesions.  Mucosa:  Normal mucosa, No polyps or masses.  Septum:  Normal, Septum non-obstructing.  Turbinates:  Normal, No turbinate abnormalities    ORAL CAVITY AND OROPHARYNX  Lips:  Normal.  Dental and gingiva:  Normal, No obvious dental or gingival disease.  Mucosa:  Normal, Moist mucous membranes.  Tongue:  Normal, Tongue mobile with no mucosal abnormalities  Hard and soft palate:  Normal, Hard and soft palate without cleft or  mucosal lesions.  Oral pharynx:  Normal, Posterior pharynx without lesions or remarkable asymmetry.  Saliva:  Normal, Clear saliva.  Masses:  Normal, No palpable masses or pathologically enlarged lymph nodes.    NECK  Masses/lymph nodes:  Normal, No worrisome neck masses or lymph nodes.  Salivary glands:  Normal, Parotid and submandibular glands.  Trachea and larynx position:  Normal, Trachea and larynx midline.  Thyroid:  Normal, No thyroid abnormality.  Tenderness:  Normal, No cervical tenderness.  Suppleness:  Normal, Neck supple    NEUROLOGICAL  Speech pattern:  Normal, Proasaic    RESPIRATORY  Symmetry and Respiratory effort:  Normal, Symmetric chest movement and expansion with no increased intercostal retractions or use of accessory muscles.     IMPRESSION  Otitis externa with purulent secretions and blood in the externa auditory canal.     RECOMMENDATION  Ciprodex ear drops two times a day x 1 week. Follow up as needed.    Lemuel Bennett MD

## 2021-06-11 NOTE — TELEPHONE ENCOUNTER
Called and spoke with Valentina.     - scheduled INR on 9/30/20 consult appt for Dr. Moore for osteoporosis, sent by [facility].

## 2021-06-11 NOTE — PROGRESS NOTES
"Pt here for med management and follow up, says she has been having \"panic attacks\" 4-5 times a day. Described as throat feels tight, and cries without knowing why when she is having them. She is moving today to a new home she \"doesn't like, but my  does\". They are moving to [place], she will keep same Rx for now and cont to have a home nurse come weekly. Depression is 3/5 with thoughts of \"I wish I was dead\", with no plan, denies feeling suicidal. Anxiety is 3/5, takes PRN benadryl \"as often as I can\" without relief but does feel tired after taking.   "

## 2021-06-11 NOTE — PROGRESS NOTES
"      Date of Service:  2017    Name:  Valentina Olmedo  :  1960  MRN:  [id number]    HPI:   Valentina Olmedo is a 57 y.o. female with  RUY, borderline personality disorder, PTSD, hypothyroid, seizure disorder, major depressive disorder.  I last saw her in Feb.    Reports increased anxiety regarding move, denies psychotic symptoms. Poor sleep, EDS, snoring, occ SOB/ gasping while lying supine.    Medical  R/o CHRIS- sleep study done 2015- \"no significant sleep apnea\".  P.E.- 2017, started on warfarin      Weekly Med set up with [employer]     Has 3 cats-Rabia, Nedfernando Yosi.  She said they help with anxiety.    Therapy-appointments with Loretta every 2 weeks.        Medications:     Cymbalta 120 mg daily  Trazodone 200 mg daily at bedtime  Seroquel [id number] mg daily at bedtime  Risperdal 3 mg daily at bedtime  Benadryl 50 mg bid prn anxiety    Cogentin 1 mg/ day  Synthroid 175  g daily  Vit D3 2,000 Units daily    For seizure disorder:   Tegretol 400 mg a.m. 300 mg twice a day  Ethotoin 1000 mg 3 times a day        Associated Clinical Documents:       Notes reviewed in EPIC and [facility] including: medication reconciliation, nurses's notes, progress notes, recent labs, PMH, and OSH records.    ROS:       10 point ROS was negative except for the items listed in HPI.  More alert and less anxiety, no psychotic symptoms      MSE:      Vital signs: refer to nursing notes from today.   Alert & oriented x 3.   Appearance: Appears stated age, casually dressed.  Speech: Slow rate, rhythm and tone.  Gait: Normal.  Musculoskeletal: Psychomotor slowing, no abnormal movements.  Mood/Affect: Flat/blunted  Thought Process: slow, circumferential  Thought Content: Passive SI, no plan. No homicide ideation.  Associations: Intact, no delusions.  Perceptions: No hallucinations.  Memory: recent and remote memory intact.  Attention span and concentration: Fair  Language: Intact.  Fund of Knowledge: Normal.  Insight and " Judgement: Adequate.    Impression:        1. borderline personality disorder,stable.   2. major depressive disorder, recurrent moderate- in remission.  3.  Adjustment disorder with anxious mood, worsening  4. R/o CHRIS    Plan:       1.  Change Seroquel to 50 mg a.m. 300 mg at bedtime continue other meds as above.  She declined offer of BuSpar. F/u 2 mos.  2. Continue therapy.  3. Pt enc to make appt w/ PMD Dr Rooney for SOB/gasping in bed  4.  Referred to sleep clinic      Total Time:       25 minutes      Coordination of Care:       25 Minutes spent on this visit with >50% time spent on coordination of care with staff, educating patient and family about diagnosis, treatment options, risks, benefits and side effects of medications, and providing supportive therapy.  Time also spent on reviewing  EHR, documentation and entering orders.      This dictation was completed with speech recognition software and there may be unintended word substitutions.

## 2021-06-11 NOTE — TELEPHONE ENCOUNTER
Who is calling:  Sarah  Reason for Call:  Home health nurse calling to speak with a nurse regarding patients dose change. She needs dosing information to set up patients meds  Date of last appointment with primary care: 5/18/2020  Okay to leave a detailed message: Yes

## 2021-06-11 NOTE — TELEPHONE ENCOUNTER
ANTICOAGULATION  MANAGEMENT    Assessment     Today's INR result of 2.40 is Therapeutic (goal INR of 2.0-3.0)        Warfarin recently held as instructed which may be affecting INR    - on 9/15 - 16/20, ED instructed to HOLD warfarin dose and resume on 9/17.    No new diet changes affecting INR    Potential interaction between Augmentin and warfarin which may affect subsequent INRs    - reported completing on 9/23.  Augmentin 875/125mg two times a day for 10 days, from 9/15-25.   - vaccinated with Flu Shot (quad).    Continues to tolerate warfarin with YES reported s/s of bleeding.   - right ear bled on 9/15/20.    Previous INR was Supratherapeutic at 3.90 on 9/10/20.    Presented in ED 2x @ [facility] on 9/15/20 and 9/16 for bleeding in right ear with pain.    Plan:     Spoke on phone with Valentina regarding INR result and instructed:   1.  Called and LM coty Hagen RN Case Mgr with [facility] with INR and warfarin dosing.  [phone]    Warfarin Dosing Instructions:  (has 5mg tabs)   - Continue current warfarin dose 10 mg daily on Sun/Thurs; and 7.5 mg daily rest of week.    Instructed patient to follow up no later than:  One wk.    Education provided: importance of consistent vitamin K intake, target INR goal and significance of current INR result, potential interaction between warfarin and Augmentin and monitoring for bleeding signs and symptoms    Valentina verbalizes understanding and agrees to warfarin dosing plan.    Instructed to call the [facility] for any changes, questions or concerns. (#[phone])   ?   Shonna Ernandez RN    Subjective/Objective:      Valentina Olmedo, a 60 y.o. female is on warfarin.     Valentina reports:     Home warfarin dose: verbally confirmed home dose with Valentina and updated on anticoagulation calendar     Missed doses: Yes:  Warfarin dose held on 9/15-16.     Medication changes:  Yes:  Reported, completing Augmentin on 9/23.     S/S of bleeding or thromboembolism:  Yes: right ear  bleeding.     New Injury or illness:  No     Changes in diet or alcohol consumption:  No     Upcoming surgery, procedure or cardioversion:  No    Anticoagulation Episode Summary     Current INR goal:   2.0-3.0   TTR:   32.5 % (1 y)   Next INR check:   10/8/2020   INR from last check:   2.40 (9/24/2020)   Weekly max warfarin dose:      Target end date:      INR check location:      Preferred lab:      Send INR reminders to:   [facility]    Indications    Pulmonary embolism with infarction (H) [I26.99]           Comments:            Anticoagulation Care Providers     Provider Role Specialty Phone number    Donald Rooney MD Referring Internal Medicine [phone]

## 2021-06-11 NOTE — TELEPHONE ENCOUNTER
Who is calling:  Valentina  Reason for Call:  Patient called requesting to speak with Kylah, stating she had just missed a call.  Date of last appointment with primary care: 9/24/2020  Okay to leave a detailed message: Yes

## 2021-06-11 NOTE — PROGRESS NOTES
Correct pharmacy verified with patient and confirmed in snapshot? [x] yes []no    Medications Phoned  to Pharmacy [] yes [x]no  Name of Pharmacist:  List Medications, including dose, quantity and instructions    Medication Prescriptions given to patient   [] yes  [x] no   List the name of the drug the prescription was written for.     Medications ordered this visit were e-scribed.  Verified by order class [x] yes  [] no  seroquel x 2  Medication changes or discontinuations were communicated to patient's pharmacy: [] yes  [x] no    UA collected [] yes    [x] no    [place] Prescription Monitoring Program Reviewed? [] yes  [x] no    Referrals were made to:   Future appointment was made: [x] yes  [] no    Dictation completed at time of chart check: [x] yes  [] no    I have checked the documentation for today s encounters and the above information has been reviewed and completed.

## 2021-06-11 NOTE — PROGRESS NOTES
Dear Dr. Rooney ,  Your patient, Valentina Olmedo was seen today for management of osteoporosis.  The last bone density scan was done on 7/14/20:  1. The spine bone density L1-L4(L2) with T-score -1.7 and significant decline of 12.7 % compared to 2016.  2. Femoral bone densities show left femoral neck T- score -2.2 and right femoral neck T-score -2.7 and significant decline of 11.9% on the left hip and 10.4% on the right hip compared to 2016.  3. Trabecular bone score indicates good trabecular bone architecture.  5. Right forearm bone density with T-score -2.2.    Patient was never treated in the past with osteoporosis medications.    Social history:  reports that she has been smoking cigarettes. She started smoking about 46 years ago. She has a 8.75 pack-year smoking history. She has never used smokeless tobacco. She reports that she does not drink alcohol or use drugs.    Past medical history:   Patient Active Problem List   Diagnosis     Epilepsy - Dr. 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 She also repots h/o ribs fracture and ankle fracture.  FH: negative for osteoporosis     Family History   Problem Relation Age of Onset     Lung cancer Maternal Grandfather 76     COPD Mother      Other Father         estranged     Diabetes Sister      Depression Sister      Alcohol abuse Sister      Other Brother         Missing since 1992     Alcohol abuse Brother      No Medical Problems Sister         Half sister       Diet and supplements: vit D 2000 IU daily, one dairy serving a day    Risk medications: long history of epilepsy and schizoaffective disorder - On carbamazepine and peganone now, but was on Topamax and valproate. On warfarin for a year now for PE. SHe has CKD 3. She is on levothyroxine. She is a smoker. She had kidney stones.    Gynecologic history: hysterectomy in early 40s, no HRT.    Laboratory testing:   Component      Latest Ref Rng & Units 9/24/2020   Sodium      136 - 145 mmol/L 142   Potassium      3.5 - 5.0 mmol/L 3.9   Chloride      98 - 107 mmol/L 110 (H)   CO2      22 - 31 mmol/L 23   Anion Gap, Calculation      5 - 18 mmol/L 9   Glucose      70 - 125 mg/dL 108   BUN      8 - 22 mg/dL 6 (L)   Creatinine      0.60 - 1.10 mg/dL 1.21 (H)   GFR MDRD Af Amer      >60 mL/min/1.73m2 55 (L)   GFR MDRD Non Af Amer      >60 mL/min/1.73m2 46 (L)   Bilirubin, Total      0.0 - 1.0 mg/dL 0.2   Calcium      8.5 - 10.5 mg/dL 8.7   Protein, Total      6.0 - 8.0 g/dL 6.8   ALBUMIN      3.5 - 5.0 g/dL 3.4 (L)   Alkaline Phosphatase      45 - 120 U/L 106   AST      0 - 40 U/L 23   ALT      0 - 45 U/L 28   WBC      4.0 - 11.0 thou/uL 3.2 (L)   RBC      3.80 - 5.40 mill/uL 4.65   Hemoglobin      12.0 - 16.0 g/dL 14.0   Hematocrit      35.0 - 47.0 % 41.5   MCV      80 - 100 fL 89   MCH      27.0 - 34.0 pg 30.0   MCHC      32.0 - 36.0 g/dL 33.7   RDW      11.0 - 14.5 % 11.7   Platelets      140 - 440 thou/uL 225   MPV      7.0 - 10.0 fL 8.1   TSH      0.30 - 5.00 uIU/mL  4.79     Component      Latest Ref Rng & Units 3/13/2020   Vitamin D, Total (25-Hydroxy)      30.0 - 80.0 ng/mL 20.6 (L)     ROS:     Constitutional: Negative.    HENT: Negative.    Eyes: Negative.    Respiratory: Negative.    Cardiovascular: Negative.    Gastrointestinal: Negative.    Endocrine: Negative.    Genitourinary: Negative.    Musculoskeletal: Negative.    Skin: Negative.    Allergic/Immunologic: Negative.    Neurological: Negative.    Hematological: Negative.    Psychiatric/Behavioral: Negative.      PE:  /75   Pulse 89   SpO2 95%    Patient in the wheelchair, not walking for > 6 months because of the back pain, only few steps around the apartment.  Constitutional:  oriented to person, place, and time, appears well-nourished. No distress.   HENT:   Head: Normocephalic.   Neck: Normal range of motion.  Pulmonary/Chest: Effort normal   Neurological: alert and oriented to person, place, and time.   Psychiatric: normal mood and affect.         Impression:   1. Age-related osteoporosis with current pathological fracture with routine healing, subsequent encounter  denosumab 60 mg (PROLIA 60 mg/ml)   2. Stage 3 chronic kidney disease (H)  denosumab 60 mg (PROLIA 60 mg/ml)   3. Fracture of vertebra due to osteoporosis with routine healing, subsequent encounter  denosumab 60 mg (PROLIA 60 mg/ml)       Plan:  1. The patient will take 1200 - 1500 mg of calcium daily from the diet and supplements.  2. The patient will take 2000 IU of vit D daily.  3. Treatment options discussed with the patient. Considering her h/o CKD 3, multiple fractures and comorbidities, we will wait of insurance approval for Prolia. Se will contact spine clinic for the start of PT, since she is pretty much immobile now.  4. I am asking for follow up when Prolia approved .    Patient Instructions   Prolia 1st when approved by your insurance. My nurse will call you.  Prolia 2nd in 6 months with my nurse. I will see you in 1 year.    Call  Spine clinic and see when to start with PT/OT.    DXA in 1 year .   Phone number to schedule [phone].    Daily calcium need is 6061-6315 mg a day from the diet and supplements.  Calcium citrate is easier to digest.  Vitamin D 2000 IU daily recommended.        Thank you for the opportunity to participate in the care of your patient. If you have any additional questions, do not hesitate to contact me at [facility].    This note has been dictated using voice recognition software. Any grammatical or context distortions are unintentional and inherent to the software      With best personal regards,   Alice Rolle MD, CCD  9/30/2020

## 2021-06-11 NOTE — TELEPHONE ENCOUNTER
Called and left detailed message with Sarah RN with [facility].     - gave new warfarin dosing instructions for Valentina Donato - 10mg on Sun/Thurs and 7.5mg rest of the week.     - INR on 9/10/20 was supratherapeutic at 3.90     - INR scheduled on 9/24/20 during OV with Dr. Rooney.

## 2021-06-12 NOTE — TELEPHONE ENCOUNTER
Refill Approved    Rx renewed per Medication Renewal Policy. Medication was last renewed on 11/21/2019.  Last office visit was 09/30/2020 with PCP office.    Vikki Ford, [facility] Triage/Med Refill 10/23/2020     Requested Prescriptions   Pending Prescriptions Disp Refills     SENNA 8.6 mg tablet [Pharmacy Med Name: SENNA 8.6MG TAB] 120 tablet 0     Sig: TAKE 2 TABLETS (17.2MG) BY MOUTH TWICE A DAY       GI Medications Refill Protocol Passed - 10/21/2020  3:05 PM        Passed - PCP or prescribing provider visit in last 12 or next 3 months.     Last office visit with prescriber/PCP: 9/24/2020 Donald Rooney MD OR same dept: 9/24/2020 Donald Rooney MD OR same specialty: 9/30/2020 Alice Rolle MD  Last physical: 9/13/2018 Last MTM visit: Visit date not found   Next visit within 3 mo: Visit date not found  Next physical within 3 mo: Visit date not found  Prescriber OR PCP: Donald Rooney MD  Last diagnosis associated with med order: 1. Other constipation  - SENNA 8.6 mg tablet [Pharmacy Med Name: SENNA 8.6MG TAB]; TAKE 2 TABLETS (17.2MG) BY MOUTH TWICE A DAY  Dispense: 120 tablet; Refill: 0    If protocol passes may refill for 12 months if within 3 months of last provider visit (or a total of 15 months).

## 2021-06-12 NOTE — PROGRESS NOTES
Patient called at home due to 2 no shows, 2 cancels, have not seen her since May for psychotherapy.  Message left for patient asking her to call back to confirm future appts.  ZHANG KelloggSW

## 2021-06-12 NOTE — TELEPHONE ENCOUNTER
Home health is calling needing refill of Duloxetine.    Pharmacy advised to contact doctor as they have attempted to order.    Please fill!  [facility] [place] [address], [address] Phone:  [phone]   Fax:  [phone]          Call Sarah at [facility] home for questions: [phone]

## 2021-06-12 NOTE — TELEPHONE ENCOUNTER
Sarah from [facility] would like to report to Cindi some issues the patient has been having with the prescription hydrOXYzine pamoate (VISTARIL) 25 MG capsule. She advised it is making the patient have sleeping problems, such as difficulty falling asleep and staying asleep. She advised that she does not want to keep pushing this medication due to breakthrough symptoms the patient is experiencing of GI issues and difficulty breathing.    She wants to connect with the patient care team because the pt appt is 12/1 and there are no sooner appts.

## 2021-06-12 NOTE — TELEPHONE ENCOUNTER
" HPI: RN note reviewed (ie.  \"patient mentioned episodes of hard time breathing\").   Long history of EDS, multiple referrals and multiple NS's to sleep clinic (last seen April 2015).     Pt referred again to sleep medicine 6/30/20.    Impression:  Suspect CHRIS, insomnia, reported s/e's from hydroxyzine.     I strongly suspect suspect sleep disordered breathing is contributing to her insomnia.      This problem will not resolve if she does not follow through with evaluation at sleep clinic.      Plan:  1. Increase Seroquel 200 mg nightly.  2. RN to call nurse \"Sunshine\". Tell her:   -About med change.    -Read bold areas in above note.        "

## 2021-06-12 NOTE — PROGRESS NOTES
"Mental Health Visit Note    8/10/2017    Start time: 1:00    Stop Time: 1:55pm   Session # 6    Valentina Olmedo is a 57 y.o. female is being seen today for    Chief Complaint   Patient presents with      Follow Up     Anxiety     Depression     Panic Attack   .     New symptoms or complaints: Stressors of moving have exhausted her, feels she is depressed.    Functional Impairment:   Personal: 3  Family: 2  Work: 4  Social:4    Clinical assessment of mental status: Patient's grooming is Well groomed, his attire is Appropriate, and his age appears Appears Stated. Behavior towards examiner is Sullen, motor activity is Retarded, and eye contact is Staring.  Patient's mood is Depressed, and affect is Blunted and Flat.  Speech/language is Delayed/Hesitant, attention is Distractible, and concentration is Brief. Thought process is Within normal, thought content is Within noraml and  Within normal.  Patient's orientation is X 3, memory is  Impairment and Recent, judgement is Impairment and Minimal, and estimated intelligence is Below Average. Demonstrated insight is Adequate while fund of knowledge is adequate.    Suicidal/Homicidal Ideation present: None Reported This Session    Patient's impression of their current status:    Patient identifies mood as \"poor\":  \"I don't know if my meds are working... I feel like I'm getting... Depressed\".    They continue to move belongings and try to pare down things they don't need. She finds this exhausting.       Processed negative cognitions, reinforced strengths, validated patient efforts. We discussed the importance of self-care in the context of situational stressors. Practiced focusing on positives, she is able to identify again that she is grateful that they did not end up homeless. Offered empathic listening and reflections and questions intended to evoke change talk, explored needs and resources, and provided emotional support. We did some problem-solving around the moving and " "disposal of belongings, which she finds stressful and overwhelming.  Patient able to identify that moving her medical equipment such as wheelchair and walker is most important thing to take care of before 's sale.  She is able to identify that if some other belongings are left in the home at the time of the 's sale, \"it won't be the end of the world\".  Patient agrees to attend to priorities around moving her belongings, follow up with psychiatrist regarding her medicines and mood.    Therapist impression of patients current state: Despite complaints, patient managing multiple stressors without ED visits/MH hospitalization.     Type of psychotherapeutic technique provided: Client centered, Solution-focused and CBT    Progress toward short term goals:Progress as expected, minimal as always but patient maintaining herself in the community without MH hospitalization, trying to contribute to move of belongings.    Review of long term goals: Not done at today's visit    Diagnosis:   1. Generalized anxiety disorder    2. Depression, major, recurrent, moderate    3. Borderline personality disorder        Plan and Follow up: Patient will return for follow up in two weeks.  She agrees to focus on balance of self-care and goal directed activity including identifying and moving her durable medical equipment.    Discharge Criteria/Planning: Client has chronic symptoms and ongoing therapy for maintenance stability recommended.    Loretta Barney MSW, LICSW  8/10/2017      This note was created with help of Dragon dictation software. Grammatical / typing errors are not intentional and inherent to the software.  "
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
## 2021-06-12 NOTE — TELEPHONE ENCOUNTER
RECEIVED HOME HEALTH CERTIFICATION AND PLAN OF CARE FOR REVIEW AND SIGNATURE - PLACED IN PROVIDER'S CLINIC MAILBOX

## 2021-06-12 NOTE — TELEPHONE ENCOUNTER
Date of Last Office Visit: 6/30/2020  Date of Next Office Visit: none (central scheduling will contact patient)  No shows since last visit: 1  Cancellations since last visit: none  ED visits since last visit:  none    Medication Cymbalta 60 mg date last ordered: 3/31/2020  Qty:180  Refills:1    Lapse in therapy greater than 7 days: no  Medication refill request verified as identical to current order: yes  Result of Last DAM, VPA, Li+ Level, CBC, or Carbamazepine Level (at or since last visit): n/a     [] Medication refilled per [facility] M-1.   [x] Medication unable to be refilled by RN due to criteria not met as indicated below:     []Eligibility - not seen in last year    []Supervision - no future appointment    []Compliance     []Verification - order discrepancy    []Controlled Medication    [x]Medication not included in RN Protocol    []90 - day supply request    []Other   Current Medication list: Valentina Olmedo   (MRN [id number])  Your Current Medications Are    albuterol (PROAIR HFA;PROVENTIL HFA;VENTOLIN HFA) 90 mcg/actuation inhaler Inhale 2 puffs every 4 (four) hours as needed for wheezing.   carBAMazepine (TEGRETOL) 200 mg tablet Take 300-400 mg by mouth see administration instructions. 2 tabs (400mg) q AM, 1.5 tabs (300mg) q PM and HS    cholecalciferol, vitamin D3, (VITAMIN D3) 50 mcg (2,000 unit) capsule Take 1 capsule (2,000 Units total) by mouth daily.   DULoxetine (CYMBALTA) 60 MG capsule Take 2 capsules (120 mg total) by mouth daily.   EPINEPHrine 0.15 mg/0.15 mL atIn Inject 1 each into the shoulder, thigh, or buttocks.   ethotoin (PEGANONE) 250 mg Tab Take 1,000 mg by mouth 3 (three) times a day.   hydrOXYzine pamoate (VISTARIL) 25 MG capsule Take 1 capsule (25 mg total) by mouth 2 (two) times a day as needed for anxiety.   levothyroxine (SYNTHROID, LEVOTHROID) 200 MCG tablet TAKE 1 TABLET BY MOUTH DAILY   melatonin 3 mg Tab tablet Take 1 tablet (3 mg total) by mouth at bedtime as needed.   olopatadine  (PATANOL) 0.1 % ophthalmic solution Administer 1 drop to both eyes 2 (two) times a day.   omeprazole (PRILOSEC) 20 MG capsule TAKE 1 CAPSULE BY MOUTH DAILY BEFORE BREAKFAST   oxybutynin (DITROPAN XL) 10 MG ER tablet TAKE 1 TABLET BY MOUTH DAILY   QUEtiapine (SEROQUEL) 100 MG tablet Take 1-2 tablets (100-200 mg total) by mouth at bedtime.   rosuvastatin (CRESTOR) 20 MG tablet TAKE 1 TABLET BY MOUTH DAILY   SENNA 8.6 mg tablet TAKE 2 TABLETS (17.2MG) BY MOUTH TWICE A DAY   warfarin ANTICOAGULANT (COUMADIN/JANTOVEN) 5 MG tablet Take 1 &1/2 tablets (7.5mg) to 2 tablets (10mg) by mouth daily, as directed.  Adjust dose based on INR results.       Medication Plan of Care at last office visit with MD/CNP:     Plan:     Risks and benefits of medication discussed with pt. Teaching done on sleep apnea.  1.  DC  Topamax, decrease seroquel 100mg q hs (MR x1),   Decrease hydroxyzine 25mg two times a day prn anxiety.  2.  Pt agreed to sleep clinic referral   3.  Continue other meds as above. F/u 3 mos.

## 2021-06-12 NOTE — TELEPHONE ENCOUNTER
Medication Question or Clarification  Who is calling: Pharmacy  What medication are you calling about (include dose and sig)?:   rosuvastatin (CRESTOR) 20 MG tablet 28 tablet 11 12/14/2019     Sig: TAKE 1 TABLET BY MOUTH DAILY    Sent to pharmacy as: rosuvastatin 20 mg tablet (CRESTOR)    Notes to Pharmacy: Refill Too Soon    E-Prescribing Status: Receipt confirmed by pharmacy (12/14/2019  4:34 AM CST)      AND  oxybutynin (DITROPAN XL) 10 MG ER tablet 28 tablet 11 2/5/2020     Sig: TAKE 1 TABLET BY MOUTH DAILY    Sent to pharmacy as: oxybutynin chloride ER 10 mg tablet,extended release 24 hr (DITROPAN XL)    Notes to Pharmacy: Refill Too Soon    E-Prescribing Status: Receipt confirmed by pharmacy (2/5/2020  7:13 PM CST      AND    omeprazole (PRILOSEC) 20 MG capsule 28 capsule 11 2/5/2020     Sig: TAKE 1 CAPSULE BY MOUTH DAILY BEFORE BREAKFAST    Sent to pharmacy as: omeprazole 20 mg capsule,delayed release (PriLOSEC)    Notes to Pharmacy: Refill Too Soon    E-Prescribing Status: Receipt confirmed by pharmacy (2/5/2020  7:14 PM CST)      AND  levothyroxine (SYNTHROID, LEVOTHROID) 200 MCG tablet 28 tablet 11 12/14/2019     Sig: TAKE 1 TABLET BY MOUTH DAILY    Sent to pharmacy as: levothyroxine 200 mcg tablet (SYNTHROID, LEVOTHROID)    Notes to Pharmacy: Refill Too Soon    E-Prescribing Status: Receipt confirmed by pharmacy (12/14/2019  4:34 AM CST)          Who prescribed the medication?: Donald Rooney MD    What is your question/concern?: Patient's insurance covers a 90 day supply, if in  Agreement need new prescriptions for these medication with new #90 quantity please.  Requested Pharmacy: [facility]ael [facility] to leave a detailed message?: No

## 2021-06-12 NOTE — TELEPHONE ENCOUNTER
RN spoke with nurse Smith in regards to patient. Sarah reported that patient has really being struggling with sleep, she will lay in bed all night before she fall asleep. She also stated that she knows provider prescribed Hydroxyzine to help, however, patient mentioned episodes of hard time breathing and GI upset when she takes the medication. She said, she wants to make sure provider knows that at this point sleeping is the biggest issue. She is requesting sleep aid or any other recommendations.

## 2021-06-12 NOTE — TELEPHONE ENCOUNTER
"Anticoagulation Annual Referral Renewal Review    Valentina Olmedo's chart reviewed for annual renewal of referral to anticoagulation monitoring.        Criteria for anticoagulation nurse and/or pharmacist renewal met   Warfarin indication: Stroke,  Pulmonary embolism with infarct Yes, per indication   Current with INR monitoring/compliant Yes Yes   Date of last office visit 09/24/2020 Yes, had office visit within last year   Time in Therapeutic Range (TTR) 33.3 % No, TTR < 60 %       Valentina Olmedo did NOT meet all criteria for anticoagulation management program initiated renewal and requires provider review. Using dot phrase, \".acmrenewalprovider\", please advise if [facility] anticoagulation management referral should be renewed or if patient should be seen in office to review anticoagulation therapy      Shonna Ernandez RN  1:39 PM    Provider Review: Anticoagulation Annual Referral Renewal    ACM Renewal Decision:  Renew ACM warfarin management      INR Range:   Continue management at current INR goal   Anticipated Duration of Therapy (from today):  Long-term anticoagulation      Donald Rooney MD  2:04 PM      "

## 2021-06-12 NOTE — PROGRESS NOTES
[facility] Daily Progress     Patient Name: Valentina Olmedo  Date: 11/3/2020  Visit #: 2/12  Referring Provider: Donald Rooney MD  Referring Diagnosis:   Chronic bilateral low back pain with right-sided sciatica [M54.41, G89.29]  - Primary       History of vertebral compression fracture [Z87.81]       Age-related osteoporosis with current pathological fracture with delayed healing, subsequent encounter [M80.00XG]       Vitamin D deficiency [E55.9]       Numbness and tingling of left lower extremity [R20.0, R20.2]       Closed compression fracture of L5 lumbar vertebra with delayed healing, subsequent encounter [S32.050G]       Closed compression fracture of L2 lumbar vertebra with delayed healing, subsequent encounter [S32.020G]         Visit Diagnosis:     ICD-10-CM    1. Chronic bilateral low back pain with right-sided sciatica  M54.41     G89.29    2. History of vertebral compression fracture  Z87.81    3. Age-related osteoporosis with current pathological fracture with delayed healing, subsequent encounter  M80.00XG    4. Generalized muscle weakness  M62.81    5. Physical deconditioning  R53.81    6. Abnormality of gait  R26.9    7. Difficulty balancing  R29.818        Assessment:     Pt demo's significant below norm endurance with 2 minute walk test compared to gender/age norms.  Pt demo's good spine ROM.  Pt demo's significant weakness with core/trunk and hip strength.    Patient is benefitting from skilled physical therapy and is making steady progress toward functional goals.  Patient is appropriate to continue with skilled physical therapy intervention, as indicated by initial plan of care.    Goal Status: on going  Pt. will demonstrate/verbalize independence in self-management of condition in : 12 weeks;Comment  Comment:: educated on strengthening program for core/trunk and LEs  Pt. will be able to walk : 10 minutes;around the house;on even surfaces;with less pain;with less  difficulty;for household mobility;for community mobility;for exercise/recreation;in 12 weeks;Comment  Comment:: around her apartment building with walker without falling  Patient will stand : 10 minutes;with less pain;with less difficultty;for home chores;for other activities;in 12 weeks    Patient will decrease : by _ points;for improved quality of function;for improved quality of life;in 12 weeks  by ___ points: 6      Plan / Patient Education:     Continue with initial plan of care.   Assess response to adding TA iso and clamshell to HEP and increase to walking in hallway with WW and W/C follow.  Attempt progressing strength as able.    Exercises:  Exercise #1: Sit to stand x5-10 reps 1-2x/day  Comment #1: Walking out in hallway with w/c follow and using WW x1-2 minute 1-2x/day  Exercise #2: Scooting forward and backward in W/C down hallway at apartment complex  Comment #2: Supine TA iso x2-3 seconds hold x5-10 reps 1-2x/day  Exercise #3: Clamshell x10 reps 1-2x/day          Subjective:     Pain Ratin  Pt reports she is sore today in her low back and both of her legs.   Pt reports when she was trying to pull herself down the hallway she could get a heavy, dull pain in her legs so she would stop and take a break.  Pt reports she was trying to do the sit to stand - hard to do up to 8 reps - from fatigue and pain.  Pt reports help by PCA for about 20 hours a week - whatever helps she needs.  Pt reports when she is home she is usually laying down because that is more comfortable than sitting in her chair. When she does walk around she usually will no use her walker.    Patient Outcome Measures:  Modified Oswestry Low Back Pain Disablity Questionnaire  in %: 32     Scores range from 0-100%, where a score of 0% represents minimal pain and maximal function. The minimal clinically important difference is a score reduction of 12%. FROM INITIAL    Objective:      Two Minute Walk Test  Total meters walked: 222 ft  AD  used? W/W 1/2 through test  RPE: not working hard but breathing hard, pain in back 3-4/10 that increased to 7-8/10 by end of walk  Pt demo'ed shuffling gait R > L with decreased L step length by end of test  Age gender norm: 578 ft  TWO- MINUTE WALK TEST (2mwt) MEANS   MEAN DISTANCE (FEET) METERS       + cm   18-54 600.4 183 m 0.192  cm   55-59 578.7 176 m 38.776 cm   60-64 545.9 166 m 32.936 cm   65-69 509.2 155 m 20.416 cm   70-74 478.7 145 m 95.9576 cm   75-79 462.3 140 m 90.904 cm   80-85 440.6 136 m 29.488 cm   R.W Marielos et al (2014) Conversion Cheryl Pitts Joana PT, CLT-RAMSEY  Trunk flex min limited with stretch only, ext WNL and feels good to perform, B SB WNL but pt feels like she will fall over to the R side    R hip abd strength 3+/5 with increased pain L side, L hip abd 3/5 with minimal pain    Treatment Today      TREATMENT MINUTES COMMENTS   Evaluation     Self-care/ Home management     Manual therapy     Neuromuscular Re-education     Therapeutic Activity     Therapeutic Exercises 30 Assessed response to HEP and activity level. Assessed trunk ROM and discussed results.  Performed, reviewed, and added to HEP per pt instructions and flow sheet and printed AVS for home.      Gait training 8 Assessed 2 minute walk test and discussed results compared to age/gender norms. Increased walking program at home.   Modality__________________                Total 38    Blank areas are intentional and mean the treatment did not include these items.       Griselda Garcia PT, DPT, OCS, CLT  11/3/2020  12:04 PM

## 2021-06-12 NOTE — TELEPHONE ENCOUNTER
Carla Gutierrez for refills requested below?  Refills have been set up for you to review.  Nissa MALIN CMA/MARIA LUISA....................12:46 PM
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
## 2021-06-12 NOTE — PROGRESS NOTES
"Mental Health Visit Note    7/27/2017    Start time: 1pm    Stop Time: 1:55pm   Session # 5    Valentina Olmedo is a 57 y.o. female is being seen today for    Chief Complaint   Patient presents with     Follow-up     Anxiety     Depression   .     New symptoms or complaints: Distress over circumstances of new apartment.    Functional Impairment:   Personal: 3    Family: 3    Work: 4    Social: 3        Clinical assessment of mental status: Patient's grooming is Well groomed, her attire is Appropriate, and her age appears Appears Stated. Behavior towards examiner is Sullen, motor activity is Within normal, and eye contact is Staring. Patient's mood is Irritable and anxious, and affect is flattened but congruent w/content of speech. Speech/language is Delayed/Hesitant, attention is Distractible, and concentration is Brief. Thought process is Slow, thought content is free of hallucinations or delusions. Patient's orientation is X 3, memory is Impairment, Recent and Remote, judgement is Minimal, and estimated intelligence is Below Average. Demonstrated insight is Diminished while fund of knowledge is adequate.      Suicidal/Homicidal Ideation present: None Reported This Session    Patient's impression of their current status:    \"I hate the new apartment. It's too small, and I don't like living in an apartment.\"    Patient and her  have gotten a 1BR subsidized apartment, with an elevator, in [place] and have actually already moved. She did seek out the help of  CM for moving expenses and Spiritism members with help with details and prep.   She says she lost her [employer] worker due to loss of MA.  Does not know why she lost MA.  Is getting a new [place] c[place]  CM.  Processed negative cognitions, reinforced strengths, validated patient efforts. We practiced staying in gratitude, patient able to identify gratitude for not being homeless at this time.  Problem solved her difficulties with the apartment. Patient able to " "identify \"too many stuff\". Discussed effect of community/social engagment on mental health. Encouraged patient to ask MH CM to explore if she remains eligible for MA.   She will attend service at [facility] for her new location. Will continue to make efforts to clear out belongings from old residence.  Will go through what she's moved to make the new space more liveable.   Agrees to ask her new  CM about her insurance/MA, and if covered an [employer] worker.    Therapist impression of patients current state: Patient not reality based in her expectations for her situation, suspect personality traits get in the way of her satisfaction with having avoided homelessness that she had dreaded and feared so much.    Type of psychotherapeutic technique provided: Client centered, Solution-focused and CBT    Progress toward short term goals:Limited. She did seek out the help of  CM for moving expenses and Mu-ism members with help with details and prep.     Review of long term goals: Not done at today's visit, Due next session.   Diagnosis:   1. Generalized anxiety disorder    2. Depression, major, recurrent, moderate    3. Anxiety disorder, unspecified type    4. Borderline personality disorder        Plan and Follow up: Patient will return for follow up in two weeks. She will attend service at [facility] for her new location. Will continue to make efforts to clear out belongings from old residence.  Will go through what she's moved to make the new space more liveable. Agrees to ask her new  CM about her insurance/MA, and if covered an [employer] worker.    Discharge Criteria/Planning: Client has chronic symptoms and ongoing therapy for maintenance stability recommended.    Loretta Barney MSW, LICSW  7/27/2017      This note was created with help of Dragon dictation software. Grammatical / typing errors are not intentional and inherent to the software.  "

## 2021-06-12 NOTE — PROGRESS NOTES
Returned patient's telephone call, she states she was confused about appts.  Provided patient delbert appts made.  Loretta Barney, ZHNAGSW

## 2021-06-12 NOTE — TELEPHONE ENCOUNTER
Who is calling:  Sarah  Reason for Call:  Needs to know dosing for her coumadin/warfarin  Date of last appointment with primary care: n/a  Okay to leave a detailed message: Yes

## 2021-06-12 NOTE — TELEPHONE ENCOUNTER
Cymbalta 60mg script called into pharmacy per V.O. from Dr. Puente. Pharmacist Ramiro took and repeated order for accuracy.

## 2021-06-12 NOTE — TELEPHONE ENCOUNTER
Called and left message with Sarah RN with [facility]     - needed current warfarin dosing.     -  [id number] on Sun/Thurs and 7.5mg all other days.     - will do reminder call to check INR on 1030/20.

## 2021-06-12 NOTE — PROGRESS NOTES
"Prolia Injection Phone Screen      Screening questions have been asked 2-3 days prior to administration visit for Prolia. If any questions are answered with \"Yes,\" this phone encounter were will routed to ordering provider for further evaluation.     1.  When was the last injection?  1st dose    2.  Has insurance for this injection been verified?  Yes    3.  Did you experience any new onset achiness or rashes that lasted for over a month with your previous Prolia injection?   N/A    4.  Do you have a fever over 101?F or a new deep cough that is unusual for you today? No    5.  Have you started any new medications in the last 6 months that you were told could affect your immune system? These may have been prescribed by oncologist, transplant, rheumatology, or dermatology.   No    6.  In the last 6 months have you have gastric bypass or parathyroid surgery?   No    7.  Do you plan dental work requiring drilling into the bone such as implants/extractions or oral surgery in the next 2-3 months?   No    8. Do you have new insurance since the last injection?    Patient informed if symptoms discussed above present prior to their administration appointment, they are to notify clinic immediately.     Debora Marcum        The following steps were completed to comply with the REMS program for Prolia:  1. Ordering provider has previously reviewed information in the Medication Guide and Patient Counseling Chart, including the serious risks of Prolia  and the symptoms of each risk and have been advised to seek prompt medical attention if they have signs or symptoms of any of the serious risks.  2. Provided each patient a copy of the Medication Guide and Patient Brochure.  See MAR for administration details.   Indication: Prolia  (denosumab) is a prescription medicine used to treat osteoporosis in patients who:   Are at high risk for fracture, meaning patients who have had a fracture related to osteoporosis, or who have multiple " risk factors for fracture; Cannot use another osteoporosis medicine or other osteoporosis medicines did not work well.   The timeline for early/late injections would be 4 weeks early and any time after the 6 month joe. If a patient receives their injection late, then the subsequent injection would be 6 months from the date that they actually received the injection    Have the screening questions been asked prior to this administration? Yes

## 2021-06-12 NOTE — TELEPHONE ENCOUNTER
ANTICOAGULATION  MANAGEMENT    Assessment     Today's INR result of 2.30 is Therapeutic (goal INR of 2.0-3.0)        Warfarin taken as previously instructed    No new diet changes affecting INR    No interaction expected between Prolia / Vitamin-D3 pills and warfarin    Potential interaction between Cipro / Dexamethasone eye drops and warfarin which may affect subsequent INRs    - will start Prolia injection after approval from insurance.    - intilling ear drops for 7 days, from 9/28-10/5/20 for bilateral ear infection.    Continues to tolerate warfarin with no reported s/s of bleeding or thromboembolism     Previous INR was Therapeutic at 2.60 on 9/28/20.    Plan:     Spoke on phone with Valentina regarding INR result and instructed:    1.  Called and left detailed message with RAJEEV Smith Nurse Manager for Valentina  ([phone]).  They set up meds for Valentina every 2 wks;  Gave INR result and warfarin dosing instructions.    Warfarin Dosing Instructions:    - Continue current warfarin dose 10 mg daily on Sun/Thurs; and 7.5 mg daily rest of week.    Instructed patient to follow up no later than:  4 wks.    Education provided: importance of consistent vitamin K intake, target INR goal and significance of current INR result and no interaction anticipated between warfarin and Prolia and Vitamin-D3    Valentina verbalizes understanding and agrees to warfarin dosing plan.    Instructed to call the [facility] for any changes, questions or concerns. (#[phone])   ?   Shonna Ernandez RN    Subjective/Objective:      Valentina Olmedo, a 60 y.o. female is on warfarin.     Valentina reports:     Home warfarin dose: as updated on anticoagulation calendar per template     Missed doses: No     Medication changes:  Yes:  Continues with ABX ear drops till 10/5/20 for major. Ear infection and will complete on 10/5.  Also started with Vitamin-D3 today and will get her first Prolia injection on 10/5/20.     S/S of bleeding or thromboembolism:  No      New Injury or illness:  No     Changes in diet or alcohol consumption:  No     Upcoming surgery, procedure or cardioversion:  No    Anticoagulation Episode Summary     Current INR goal:  2.0-3.0   TTR:  33.3 % (1 y)   Next INR check:  10/30/2020   INR from last check:  2.30 (10/2/2020)   Weekly max warfarin dose:     Target end date:     INR check location:     Preferred lab:     Send INR reminders to:  [facility]    Indications    Pulmonary embolism with infarction (H) [I26.99]           Comments:           Anticoagulation Care Providers     Provider Role Specialty Phone number    Donald Rooney MD Referring Internal Medicine [phone]

## 2021-06-13 NOTE — PROGRESS NOTES
"Mental Health Visit Note    2017    Start time: 1:00    Stop Time: 1:55pm   Session # 7    Valentina Olmedo is a 57 y.o. female is being seen today for    Chief Complaint   Patient presents with     Follow-up     Anxiety     Depression   .     New symptoms or complaints: Patient complains of worsened depression, unable to identify triggers except feeling exhausted after their move.    Functional Impairment:   Personal: 3  Family: 2  Work: 4  Social: 4    Clinical assessment of mental status: Patient's grooming is Well groomed, his attire is Appropriate, and his age appears Appears Stated. Behavior towards examiner is Sullen, motor activity is Retarded, and eye contact is Staring.  Patient's mood is Depressed, and affect is Blunted and Flat.  Speech/language is Delayed/Hesitant, attention is Distractible, and concentration is Brief. Thought process is Within normal, thought content is Within noraml and  Within normal.  Patient's orientation is X 3, memory is  Impairment and Recent, judgement is Impairment and Minimal, and estimated intelligence is Below Average. Demonstrated insight is Adequate while fund of knowledge is adequate.    Suicidal/Homicidal Ideation present: None Reported This Session    Patient's impression of their current status:    \"I'm getting worse\"  Cites worsening anxiety and depression.  She says it may be her medications \"my nurse messed up my medication box.\"  She was able to recognize errors and did communicate these.  Asked about triggers for her depression, she denies same but with further discussion, believes she may be processing loss of grandmother who  in February. This was a woman who helped raise patient from the ages of 5 to 11 when patient's parents weren't able to.  Patient states \"I know she loved me, and her loss changes me.\"    There is financial stress because they are saving to get the leased car repaired.   She did not explore classes to learn to use her IPhone. She did " "go with spouse to ask property management about using small nails to put up their pictures and feels satisfied that they can finish their apartment however that comes out.  She is not walking, but is trying to get out of the house.    Processed negative cognitions, reinforced strengths, validated patient efforts.  Discussed likely \"let down\" of her emotions now that the foreclosure and the move is finally done.  We discussed that with the stress of finances and looming foreclosure and possible homelessness, she may not have gotten a chance to properly grieve her grandmother's passing. Reviewed and read aloud Sachi's piece on grief: grief as \"relentless 100 foot waves\" that gradually lesson frequency and strength.   Patient agrees to read the SHONNA Kelsey piece on her own, allowing her feelings about the loss of her grandmother and thinking about how she might best honor her memory.    Therapist impression of patients current state: Prolonged grief and recovery from intense emotions from the last year could be pieces of her picture, patient has limited insight into her own emotional status apart from her identity of \"depressed\".  Despite complaints, patient managing multiple stressors in community without ED visits/MH hospitalization. Motivation, however, remains low.    Type of psychotherapeutic technique provided: Client centered, Solution-focused, CBT and Motivational Interviewing    Progress toward short term goals:Progress as usual minimal.  She did not explore classes to learn to use her IPhone. Moved her durable medical equipment.  She is not walking, but is trying to get out of the house.    Review of long term goals: Long-term goals reviewed   and Treatment Plan updated    Diagnosis:   1. Depression, major, recurrent, moderate    2. Generalized anxiety disorder    3. Borderline personality disorder        Plan and Follow up: Patient will return for follow up in two weeks.  Patient agrees to read the SHONNA Snow " piece on her own, allowing her feelings about the loss of her grandmother and thinking about how she might best honor her memory.    Discharge Criteria/Planning: Client has chronic symptoms and ongoing therapy for maintenance stability recommended.    Loretta Barney MSW, LICSW  9/14/2017      This note was created with help of Dragon dictation software. Grammatical / typing errors are not intentional and inherent to the software.

## 2021-06-13 NOTE — TELEPHONE ENCOUNTER
RECEIVED MEDICATION INTERACTION SHEET FOR REVIEW AND SIGNATURE - PLACED IN PROVIDER'S CLINIC MAILBOX

## 2021-06-13 NOTE — TELEPHONE ENCOUNTER
Received a call from [facility] Jacobo ROBLES on file. He's wanting to know if Dr. Puente can complete some forms on Pt behalf for Social Security. He can be reached at [phone]

## 2021-06-13 NOTE — PROGRESS NOTES
[facility] Daily Progress     Patient Name: Valentina Olmedo  Date: 11/17/2020  Visit #: 3/12  Referring Provider: Donald Rooney MD  Referring Diagnosis:   Chronic bilateral low back pain with right-sided sciatica [M54.41, G89.29]  - Primary       History of vertebral compression fracture [Z87.81]       Age-related osteoporosis with current pathological fracture with delayed healing, subsequent encounter [M80.00XG]       Vitamin D deficiency [E55.9]       Numbness and tingling of left lower extremity [R20.0, R20.2]       Closed compression fracture of L5 lumbar vertebra with delayed healing, subsequent encounter [S32.050G]       Closed compression fracture of L2 lumbar vertebra with delayed healing, subsequent encounter [S32.020G]         Visit Diagnosis:     ICD-10-CM    1. Chronic bilateral low back pain with right-sided sciatica  M54.41     G89.29    2. History of vertebral compression fracture  Z87.81    3. Age-related osteoporosis with current pathological fracture with delayed healing, subsequent encounter  M80.00XG    4. Generalized muscle weakness  M62.81    5. Physical deconditioning  R53.81    6. Abnormality of gait  R26.9    7. Difficulty balancing  R29.818        Assessment:     B hips sensitive to strengthening but able to perform isometrics with minimal increase in pain.    FROM LAST VISITS  Pt demo's significant below norm endurance with 2 minute walk test compared to gender/age norms.  Pt demo's good spine ROM.  Pt demo's significant weakness with core/trunk and hip strength.    Patient is benefitting from skilled physical therapy and is making steady progress toward functional goals.  Patient is appropriate to continue with skilled physical therapy intervention, as indicated by initial plan of care.    Goal Status: on going  Pt. will demonstrate/verbalize independence in self-management of condition in : 12 weeks;Comment  Comment:: educated on strengthening program for core/trunk  and LEs  Pt. will be able to walk : 10 minutes;around the house;on even surfaces;with less pain;with less difficulty;for household mobility;for community mobility;for exercise/recreation;in 12 weeks;Comment  Comment:: around her apartment building with walker without falling  Patient will stand : 10 minutes;with less pain;with less difficultty;for home chores;for other activities;in 12 weeks    Patient will decrease : by _ points;for improved quality of function;for improved quality of life;in 12 weeks  by ___ points: 6      Plan / Patient Education:     Continue with initial plan of care.   Assess response to adding hip add and changing clamshell to hip abd hooklying to HEP and increase to walking in hallway with WW and W/C follow.  Assess if pt able to prop legs up with blankets and pillow into decompressive position.  Attempt progressing strength as able.    Exercises:  Exercise #1: Sit to stand x5-10 reps 1-2x/day  Comment #1: Walking out in hallway with w/c follow and using WW x1-2 minute 1-2x/day  Exercise #2: Scooting forward and backward in W/C down hallway at apartment complex  Comment #2: Supine TA iso x2-3 seconds hold x5-10 reps 1-2x/day  Exercise #3: Changed to hooklying hip abd with L2 band x10 reps  Comment #3: Added seated hip add iso into pillow x2-3 seconds x5-10 reps 1-2x/day          Subjective:     Pain Ratin-6/10 - Pt feels like she can have increased R hip pain after doing all exercises.   Pt also reports she sat a lot yesterday - she was sitting on her couch and didn't have help to get off the couch for a while. She started to get sore later in the day.  Pt reports she is trying to walk for almost a minute and then she sat due to feeling weak and soreness in R hip.  Pt reports she is still doing about 8 reps with sit to stand.    Patient Outcome Measures:  Modified Oswestry Low Back Pain Disablity Questionnaire  in %: 32     Scores range from 0-100%, where a score of 0% represents minimal  pain and maximal function. The minimal clinically important difference is a score reduction of 12%. FROM INITIAL    Objective:     Pt demo'ed improved coordination with breathing and TA iso with verbal and tactile cues    Seated hip add x10 reps increased slight L glut pain but not too bad    End range abd with hooklying hip abd increased R hip pain - if kept mid range able to do without increase in pain    FROM LAST VISITS   Two Minute Walk Test  Total meters walked: 222 ft  AD used? W/W 1/2 through test  RPE: not working hard but breathing hard, pain in back 3-4/10 that increased to 7-8/10 by end of walk  Pt demo'ed shuffling gait R > L with decreased L step length by end of test  Age gender norm: 578 ft  TWO- MINUTE WALK TEST (2mwt) MEANS   MEAN DISTANCE (FEET) METERS       + cm   18-54 600.4 183 m 0.192  cm   55-59 578.7 176 m 38.776 cm   60-64 545.9 166 m 32.936 cm   65-69 509.2 155 m 20.416 cm   70-74 478.7 145 m 95.9576 cm   75-79 462.3 140 m 90.904 cm   80-85 440.6 136 m 29.488 cm   R.W Marielos et al (2014) Conversion Cheryl Rubiopool PT, CLT-RAMSEY  Trunk flex min limited with stretch only, ext WNL and feels good to perform, B SB WNL but pt feels like she will fall over to the R side    R hip abd strength 3+/5 with increased pain L side, L hip abd 3/5 with minimal pain    Treatment Today      TREATMENT MINUTES COMMENTS   Evaluation     Self-care/ Home management 8 Educated pt on decompression position for low back with legs supported up on bolster or multiple blankets and throw pillows for position for self-management of pain relief at home.   Manual therapy     Neuromuscular Re-education     Therapeutic Activity     Therapeutic Exercises 34 Assessed response to HEP and activity level.   Performed, reviewed, and added to HEP per pt instructions and flow sheet and printed AVS for home.   Verbal and tactile cues given to improve breathing with exercises.     Gait training     Modality__________________                 Total 42    Blank areas are intentional and mean the treatment did not include these items.       Griselda Garcia PT, DPT, OCS, CLT  11/17/2020  12:04 PM

## 2021-06-13 NOTE — TELEPHONE ENCOUNTER
Jacobo called back, he isn't able to fax the forms today but he will fax them to  so that they can be placed in Dr Puente's mailbox for review.

## 2021-06-13 NOTE — PROGRESS NOTES
[facility] Daily Progress     Patient Name: Valentina Olmedo  Date: 12/9/2020  Visit #: 4/12  Referring Provider: Donald Rooney MD  Referring Diagnosis:   Chronic bilateral low back pain with right-sided sciatica [M54.41, G89.29]  - Primary       History of vertebral compression fracture [Z87.81]       Age-related osteoporosis with current pathological fracture with delayed healing, subsequent encounter [M80.00XG]       Vitamin D deficiency [E55.9]       Numbness and tingling of left lower extremity [R20.0, R20.2]       Closed compression fracture of L5 lumbar vertebra with delayed healing, subsequent encounter [S32.050G]       Closed compression fracture of L2 lumbar vertebra with delayed healing, subsequent encounter [S32.020G]         Visit Diagnosis:     ICD-10-CM    1. Chronic bilateral low back pain with right-sided sciatica  M54.41     G89.29    2. History of vertebral compression fracture  Z87.81    3. Age-related osteoporosis with current pathological fracture with delayed healing, subsequent encounter  M80.00XG    4. Generalized muscle weakness  M62.81    5. Physical deconditioning  R53.81    6. Abnormality of gait  R26.9    7. Difficulty balancing  R29.818        Assessment:     Pt demo's increased R LE pain with SLS today - possibly due to sensitivity from fall after thanksgiving weekend.    FROM LAST VISITS  Pt demo's significant below norm endurance with 2 minute walk test compared to gender/age norms.  Pt demo's good spine ROM.  Pt demo's significant weakness with core/trunk and hip strength.    Patient is benefitting from skilled physical therapy and is making steady progress toward functional goals.  Patient is appropriate to continue with skilled physical therapy intervention, as indicated by initial plan of care.    Goal Status:   Pt. will demonstrate/verbalize independence in self-management of condition in : 12 weeks;Comment  Comment:: educated on strengthening program for  core/trunk and LEs    Pt. will be able to walk : 10 minutes;around the house;on even surfaces;with less pain;with less difficulty;for household mobility;for community mobility;for exercise/recreation;in 12 weeks;Comment  Comment:: around her apartment building with walker without falling - IMPROVING - was practicing walking a lot more until she fell over Thanksgiving weekend    Patient will stand : 10 minutes;with less pain;with less difficulty;for home chores;for other activities;in 12 weeks    Patient will decrease : by _ points;for improved quality of function;for improved quality of life;in 12 weeks  by ___ points: 6      Plan / Patient Education:     Continue with initial plan of care.   Assess response to adding seated marching, neck stretches and SLS.  Attempt progressing strength as able.    Exercises:  Exercise #1: Sit to stand x5-10 reps 1-2x/day  Comment #1: Walking out in hallway with w/c follow and using WW x1-2 minute 1-2x/day  Exercise #2: Scooting forward and backward in W/C down hallway at apartment complex  Comment #2: Supine TA iso x2-3 seconds hold x5-10 reps 1-2x/day  Exercise #3: Changed to hooklying hip abd with L2 band x10 reps  Comment #3: Added seated hip add iso into pillow x2-3 seconds x5-10 reps 1-2x/day  Exercise #4: Seated marching x5-10 reps 1-2x/day  Comment #4: Neck flex, SB and rotation ROM x5-10 reps 1-2x/day  Exercise #5: SLS B x5-10 seconds x5-10 reps each 1-2x/day          Subjective:     Pt reports she fell on Nov 26th - the night of Thanksgiving. Pt was changing at the end of the night and her legs gave out and she fell down on to knees and hands and had to have the paramedics come and help her up. Then she walked to the bedroom and sat on the bed.   Pt reports since then she hasn't had any more falls but she has been very careful with how much she is walking and how much exercise she is doing.    Pain Rating: 3/10 Pt reports she feels pain today mostly in her hands and back  and R leg.  Pt also feels like she has been having neck pain and more headaches - haven't been too bad except for 1 time.    Patient Outcome Measures:  Modified Oswestry Low Back Pain Disablity Questionnaire  in %: 32     Scores range from 0-100%, where a score of 0% represents minimal pain and maximal function. The minimal clinically important difference is a score reduction of 12%. FROM INITIAL    Objective:     Min limited with cervical ROM with pain    Standing R SLS only 1-2 seconds due to increase in R low back and buttock pain, L SLS ~5 seconds with UE support without pain    Standing marching R LE with significant pain, seated march R LE with min pain and weakness    FROM LAST VISITS  Two Minute Walk Test  Total meters walked: 222 ft  AD used? W/W 1/2 through test  RPE: not working hard but breathing hard, pain in back 3-4/10 that increased to 7-8/10 by end of walk  Pt demo'ed shuffling gait R > L with decreased L step length by end of test  Age gender norm: 578 ft  TWO- MINUTE WALK TEST (2mwt) MEANS   MEAN DISTANCE (FEET) METERS       + cm   18-54 600.4 183 m 0.192  cm   55-59 578.7 176 m 38.776 cm   60-64 545.9 166 m 32.936 cm   65-69 509.2 155 m 20.416 cm   70-74 478.7 145 m 95.9576 cm   75-79 462.3 140 m 90.904 cm   80-85 440.6 136 m 29.488 cm   R.W Marielos et al (2014) Conversion Cheryl Rubiopool PT, CLT-RAMSEY  Trunk flex min limited with stretch only, ext WNL and feels good to perform, B SB WNL but pt feels like she will fall over to the R side    R hip abd strength 3+/5 with increased pain L side, L hip abd 3/5 with minimal pain    Treatment Today      TREATMENT MINUTES COMMENTS   Evaluation     Self-care/ Home management     Manual therapy     Neuromuscular Re-education 8 Assessed B SLS, performed and added to home program for falls risk to do as able with pain levels. Discussed improved SLS times can decrease risk of falls.   Therapeutic Activity     Therapeutic Exercises 32 Reassessed  response to HEP and activity level.   Performed, reviewed, and added to HEP per pt instructions and flow sheet and printed AVS for home.   Discussed how movement and blood flow can help decrease tissue sensitivity.     Gait training     Modality__________________                Total 40    Blank areas are intentional and mean the treatment did not include these items.       Griselda Garcia PT, DPT, OCS, CLT  12/9/2020  12:04 PM

## 2021-06-13 NOTE — TELEPHONE ENCOUNTER
ACN returned [facility]' call and she reported that she did not get any voicemail message regarding inr or warfarin dosing for the patient on 12/8.    Updated that on 12/8 INR was 1.8 and dose was adjusted to   10 mg daily on Tues/Fri; and 7.5 mg daily rest of week. ( per INR check encounter dated 12/8 )    Made aware that INR check reccommended will be on 12/29.    She has no other concerns at this time.    Zari Maria RN

## 2021-06-13 NOTE — TELEPHONE ENCOUNTER
Spoke to patient and she has not yet scheduled the Sleep Study appointment, but said she plans on it.   She did not have any specific dates in mind.

## 2021-06-13 NOTE — PROGRESS NOTES
"Mental Health Visit Note    9/28/2017    Start time: 1:06    Stop Time: 1:36pm  (30 minutes)  Session # 8    Valentina Olmedo is a 57 y.o. female is being seen today for    Chief Complaint   Patient presents with      Follow Up     Anxiety     Depression   .     New symptoms or complaints: None    Functional Impairment:   Personal: 3  Family: 2  Work: 4  Social: 4    Clinical assessment of mental status: Patient's grooming is Well groomed, his attire is Appropriate, and his age appears Appears Stated. Behavior towards examiner is Sullen, motor activity is Retarded, and eye contact is Staring.  Patient's mood is Depressed, and affect is Blunted and Flat.  Speech/language is Delayed/Hesitant, attention is Distractible, and concentration is Brief. Thought process is Within normal and Slow, thought content is Within noraml and  Within normal.  Patient's orientation is X 3, memory is  Impairment and Recent, judgement is Impairment and Minimal, and estimated intelligence is Below Average. Demonstrated insight is Adequate while fund of knowledge is adequate.    Suicidal/Homicidal Ideation present: None Reported This Session    Patient's impression of their current status:    Patient seen together with spouse, who patient requests present for collateral and support.   Patient initially reports mood as slightly better.  She notes acute anxiety and depression have lessened, but then notes she \"spent 3 days in bed, feeling low energy\". She is watching TV instead of sleeping, but not getting up and dressed.  Does   Patient did read the [employer] piece on her own, allowing her feelings about the loss of her grandmother \"I cried some\". Thought about how she might best honor her memory, but initally unresponsive when asked for examples. She was able to identify \"she'd want me to take care of myself.\"   Asked what that would mean for her, patient identifies \"she'd want me to be in a good marriage.  She'd want me to eat.  She'd want " "me to do house things.\"    Reinforced strengths, validated patient efforts. We started treatment plan review.  Patient became mimimally responsive during session and admitted she'd not eaten today and may have low blood sugar.  She declined a hot chocolate, agrees to go home and eat a meal.  Spouse with her.  She agrees to get up daily, get dressed, be with spouse in living room and to activate by going out to stores, etc.  She will check with psychiatrist on med change if her severe apathy continues or with MD if she believes it is due to fatigue.    Therapist impression of patients current state: Unable to determine if patient's extended stay in bed is mental health or physical health issue. She participated in therapy today only with multiple prompts.    Type of psychotherapeutic technique provided: Client centered, Solution-focused, CBT and Motivational Interviewing    Progress toward short term goals: Progress minimal as usual but did partial homework this week:  Patient did read the [email or web address] Snow piece on grief on her own, allowing her feelings about the loss of her grandmother \"I cried some\". Thought about how she might best honor her memory, but initally unresponsive when asked for examples.     Review of long term goals: Not done at today's visit and Date of last review 8/31/2017    Diagnosis:   1. Generalized anxiety disorder    2. Depression, major, recurrent, moderate    3. Borderline personality disorder    4. Chronic post-traumatic stress disorder (PTSD)        Plan and Follow up: Patient will return for follow up in two weeks. She agrees to get up daily, get dressed, be with spouse in living room and to activate by going out to stores, etc.  She will check with psychiatrist on med change if her severe apathy continues or with MD if she believes it is due to fatigue.     Discharge Criteria/Planning: Client has chronic symptoms and ongoing therapy for maintenance stability recommended.    Loretta Barney " [place], [facility]  9/28/2017      This note was created with help of Dragon dictation software. Grammatical / typing errors are not intentional and inherent to the software.

## 2021-06-13 NOTE — PROGRESS NOTES
This video/telephone visit will be conducted via a call between you and your physician/provider. We have found that certain health care needs can be provided without the need for an in-person physical exam. This service lets us provide the care you need with a video /telephone conversation. If a prescription is necessary we can send it directly to your pharmacy. If lab work is needed we can place an order for that and you can then stop by our lab to have the test done at a later time.    Just as we bill insurance for in-person visits, we also bill insurance for video/telephone visits. If you have questions about your insurance coverage, we recommend that you speak with your insurance company.    Patient has given verbal consent for video/Telephone visit? Yes  Patient would like the video visit invitation sent by: Text to cell phone: DIAMOND or Send to email: DIAMOND  Patient would like telephone visit, please call: [phone]  EDWARD/RIDGE : Dipesh TRAN LPN  Patient verified allergies, medications and pharmacy via phone. Patient states she  is ready for visit.  Pt is asking to go up up Melatonin to help her sleep. Vistaril removed from her med blas due to allergic response.          ill Date ID Written Sold Drug Qty Days Prescriber Rx # Pharmacy Refill Daily Dose * Pymt Type    01/29/2020 1 01/29/2020 01/29/2020 Oxycodone-Acetaminophen 5-325  8.00 2 Th Cat [id number] [id number] 0/0 30.00 [place]   01/21/2020 1 01/21/2020 01/21/2020 Oxycodone-Acetaminophen 5-325  8.00 1 Sc Omar [id number] [id number] 0/0 60.00 [place]         ________________________________________  Medications Phoned  to Pharmacy [] yes [x]no  Name of Pharmacist:  List Medications, including dose, quantity and instructions    Medications ordered this visit were e-scribed.  Verified by order class [x] yes  [] no  Melatonin 6 mg Q HS and Seroquel 200 mg  Medication changes or discontinuations were communicated to patient's pharmacy: [x] yes  []  no  Called [facility] and updated pharmacy phone and location - now she is using [facility] on [address]. D/c'd Melatonin 3 mg Q HS PRN due to dose increase  Phone check in 1 week to verify appt for sleep study  Dictation completed at time of chart check: [] yes  [x] no    I have checked the documentation for today s encounters and the above information has been reviewed and completed.

## 2021-06-13 NOTE — TELEPHONE ENCOUNTER
RN to call patient and fax Home Nurse.  New Rx: Seroquel 12.5 mg up to twice daily as needed anxiety.

## 2021-06-13 NOTE — TELEPHONE ENCOUNTER
Phone call to Jacobo DWYER , phone was identified as his. Left  msg. To let him know the clinic has not received the social security paperwork for Dr. Puente to complete.  Left clinic fax and phone number

## 2021-06-13 NOTE — TELEPHONE ENCOUNTER
Phone call to Jacobo, phone line was identified as his, left vm msg. To  let him know the clinic has not received the paperwork regarding social security for Valentina.  Clinic fax number given.

## 2021-06-13 NOTE — TELEPHONE ENCOUNTER
Returned call to Jacobo. No answer, LM. Informed that Dr. Puente is out of the office this week. Gave fax number incase he wants to fax forms to place in providers mailbox for review.

## 2021-06-13 NOTE — TELEPHONE ENCOUNTER
Who is calling:  Sarah  Reason for Call:  Verify INR level and dosing.   Date of last appointment with primary care:   Okay to leave a detailed message: Yes

## 2021-06-13 NOTE — TELEPHONE ENCOUNTER
Pharmacy asking for new script since old script has reached maximal refill.    Next sania on 2/2/2020. Last appt on 12/1/2020.        Plan:     Risks and benefits of medication discussed with pt. Teaching done on sleep apnea.  1. Increase melatonin 6 mg nightly.   2.  Pt agreed to sleep clinic referral   RN to call pt in 1 week to confirm she got sleep appt.  3.  Continue other meds as above. F/u 2 mos.  4.  Paperwork for rep payee and home nurse completed. Given to Chair to fax

## 2021-06-13 NOTE — PROGRESS NOTES
"      Date of Service:  10/3/2017    Name:  Valentina Olmedo  :  1960  MRN:  [id number]    HPI:   Valentina Olmedo is a 57 y.o. female with  RUY, borderline personality disorder, PTSD, hypothyroid, seizure disorder, major depressive disorder.  I last saw her in [facility]. At that visit, seroquel was changed to 50mg am, 300mg hs.  She is seen today with , René.  He voiced several concerns.  He reports worsening depression and she will spent all day or up to 2 days in bed. Reports \"heavy snoring\" and \"sometimes catches her breath\" (denied apneic spells).    They moved to an apartment in [place] last month (they were living in a townhouse).  Trying to get used to being in the building on the fourth floor. They have 3 cats-Yulisa Camarillo Parker.    Has new CM and med nurse.    Pt reports increased depression and anxiety.  Endorses chronic VH (these are not new)- \"sometimes see a cat or mouse running\",    Medical  R/o CHRIS- sleep study done 2015- \"no significant sleep apnea\".  P.E.- 2017, started on warfarin    [facility]-Patty Ellis [phone]    Weekly Med set- up:  [phone] ([place])    Therapy-appointments with Loretta every 2 weeks.        Medications:     Cymbalta 120 mg daily  Trazodone 200 mg daily at bedtime  Seroquel 300mg q hs  Seroquel 50 mg q am  Risperdal 3 mg daily at bedtime  Benadryl 50 mg bid prn anxiety    Cogentin 1 mg/ day  Synthroid 175  g daily  Vit D3 2,000 Units daily    For seizure disorder:   Tegretol 400 mg a.m. 300 mg twice a day  Ethotoin 1000 mg 3 times a day        Associated Clinical Documents:       Notes reviewed in EPIC and [facility] [place] including: medication reconciliation, nurses's notes, progress notes, recent labs, PMH, and OSH records.    ROS:       10 point ROS was negative except for the items listed in HPI.  More alert and less anxiety, no psychotic symptoms      MSE:      Vital signs: refer to nursing notes from today.   Alert & oriented x 3.   Appearance: Appears stated age, " "casually dressed.  Speech: Slow rate, rhythm and tone.  Gait: Normal.  Musculoskeletal: Psychomotor slowing, no abnormal movements.  Mood/Affect: Flat/blunted  Thought Process: slow, circumferential  Thought Content: Passive SI, no plan. No homicide ideation.  Associations: Intact, no delusions.  Perceptions: See HPI  Memory: recent and remote memory intact.  Attention span and concentration: Fair  Language: Intact.  Fund of Knowledge: Normal.  Insight and Judgement: Fair    Impression:        1. borderline personality disorder,stable.   2. major depressive disorder, recurrent moderate-worsening  3.  Adjustment disorder with anxious mood, worsening  4. R/o CHRIS    Plan:       1.  For worsening depression I recommend higher level of care- PHP or IOP. Pt said she would discuss this w/therapist next week.  supports this, pt is reluctant.  2.  Pt enc to f/u with sleep clinic, to r/o sleep disordered breathing and to learn better sleep hygiene behaviors. Pt declined \"I want to wait until I feel like it\".  3. No med changes made today.   4. VM left w/CM re: recommendation for higher level of care and requested call back.    Total Time:      40 minutes      Coordination of Care:       40 Minutes spent on this visit with >50% time spent on coordination of care with staff, educating patient and family about diagnosis, treatment options, risks, benefits and side effects of medications, and providing supportive therapy.  Time also spent on reviewing  EHR, documentation and entering orders.      This dictation was completed with speech recognition software and there may be unintended word substitutions.      "

## 2021-06-13 NOTE — TELEPHONE ENCOUNTER
Faxed completed paperwork to [facility]  and completed paperwork to [employer]. Confirmations received and sent to scanning.

## 2021-06-13 NOTE — TELEPHONE ENCOUNTER
Quality 110: Preventive Care And Screening: Influenza Immunization: Influenza Immunization previously received during influenza season Signed.  Given to nurse to fax.   Detail Level: Detailed

## 2021-06-13 NOTE — TELEPHONE ENCOUNTER
----- Message from Breana Puente MD sent at 12/1/2020 11:20 AM CST -----  Regarding: sleep clinic appt, r/o CHRIS  RN to call pt in 1 week to confirm she got sleep appt.    Pt has failed numerous sleep clinic referrals.  She agreed to sleep referral today.

## 2021-06-13 NOTE — PROGRESS NOTES
"  Valentina Olmedo is a 60 y.o. female who is being evaluated via a billable telephone visit.      The patient has been notified of following:     \"This telephone visit will be conducted via a call between you and your physician/provider. We have found that certain health care needs can be provided without the need for a physical exam.  This service lets us provide the care you need with a short phone conversation.  If a prescription is necessary we can send it directly to your pharmacy.  If lab work is needed we can place an order for that and you can then stop by our lab to have the test done at a later time.    Telephone visits are billed at different rates depending on your insurance coverage. During this emergency period, for some insurers they may be billed the same as an in-person visit.  Please reach out to your insurance provider with any questions.    If during the course of the call the physician/provider feels a telephone visit is not appropriate, you will not be charged for this service.\"    Patient has given verbal consent to a Telephone visit? Yes    What phone number would you like to be contacted at?     Patient would like to receive their AVS by AVS Preference: Mail a copy.    Additional provider notes: insert own note template here     Phone call duration: 25 min      HPI:  Valentina Olmedo 60 y.o. female with SCAD, bipolar type, PTSD, Neurocognitive disorder, seizure disorder, hx delirium. Last appt 6/30/20.    Fell on 11/26- got up too fast.  Called 911.  They helped her get up.  Did not need to go to   ED.  Hit her knee and arm.      ROS: Insomnia.  Requests increase melatonin dose.      Timeline:  10/27/20- Increase Seroquel 200 mg nightly.  --------------------------------------------------------  10/27/20 Telephone Encounter:  \"RN note reviewed (ie. \"patient mentioned episodes of hard time breathing\").   Long history of EDS, multiple referrals and multiple NS's to sleep clinic (last seen April " "2015). Pt referred again to sleep medicine 6/30/20.  Impression:  Suspect CHRIS, insomnia, reported s/e's from hydroxyzine.  I strongly suspect suspect sleep disordered breathing is contributing to her insomnia.\"  -Liz MCGEE  ------------------------------------------------------------      In-home services:  -Waiting to get PCA, M-F, 9-2 PM (last one quit).  -Med set up q Monday  -Food from [facility], [facility]    Weekly INR draw at clinic (3 min drive).      SOC: , René. Cat, \"Yosi\".       ROS:  EDS, chronic LBP. Mood ok,  Denies SI/manic/psychotic sx.   10 point ROS was negative except for the items listed in HPI.        ALLERGIES  Aspirin (tartrazine only), Gabapentin, Venom-honey bee, Vistaril [hydroxyzine pamoate], Yellow dye, Hydroxyzine, Latex, Other environmental allergy, Phenobarbital, and Latex        Associated Clinical Documents:       Notes reviewed in [facility] and [facility] including: medication reconciliation, nurses's notes, progress notes, recent labs, PMH, and OSH records.      MSE:      Alert & oriented x 3.   Speech: slow rate  Mood/Affect: Neutral.  Thought Process: slow rate, perseverative  Thought Content: No suicide or homicide ideation.  Associations: Intact, no delusions.  Perceptions: No hallucinations.  Memory: recent memory intact.  Attention span and concentration: fair  Language: Intact.  Fund of Knowledge: Normal.  Insight and Judgement: Adequate.        Impression:        1. SCAD, stable  2. Neurocognitive disorder- Last tested 3/20/18. Failed Neuropsych appt 8/9/19.  5. EDS, R/o CHRIS- multiple referrals and multiple NS's to sleep clinic.      Plan:     Risks and benefits of medication discussed with pt. Teaching done on sleep apnea.  1. Increase melatonin 6 mg nightly.   2.  Pt agreed to sleep clinic referral   RN to call pt in 1 week to confirm she got sleep appt.  3.  Continue other meds as above. F/u 2 mos.  4.  Paperwork for rep payee and home nurse completed. Given to " Chair to fax.    Start Time 9:00 AM   End Time 9:30 AM      Breana Puente MD     Total Time and Coordination of Care:       30 Minutes spent in the care of this patient with >50% of this time was spent in   counseling specifically discussing and educating about the pharmacologic treatment options, the risks, benefits and side effects of medication, medication adherence, importance of therapy, patient goals and providing supportive therapy.     High risk decision making secondary to drug therapy requiring ongoing lab monitoring. Additional tests such as EKG, potential for multiple interactions, requiring special neuroleptic consent and education of the patient.

## 2021-06-13 NOTE — TELEPHONE ENCOUNTER
RECEIVED PHYSICIAN'S/MEDICAL OFFICER'S STATEMENT OF PATIENT'S CAPABILITY TO MANAGE BENEFITS FOR REVIEW AND COMPLETION - PLACED IN PROVIDER'S CLINIC MAILBOX

## 2021-06-13 NOTE — TELEPHONE ENCOUNTER
Reason for call:  Other Patient called regarding (reason for call): Pt allergic to Visterol. Looking for recommendations for another medication.     Duration (how long have symptoms been present):     Have you been treated for this before? No    Additional comments: Pt needs a different medication to help her with sleep/anxiety.     Phone number to reach patient:  [phone]    Additional comments: none    Best Time:      Can we leave a detailed message on this number?

## 2021-06-13 NOTE — TELEPHONE ENCOUNTER
Patient is wondering if she can take something (medication) for anxiety. She said she use to be on Vistaril but she is allergic to that. She had an episode of anxiety last week that lasted about 3-4 hours. She tried laying down but with her brace that made it hard. She said she made an appt to see Loretta for therapy in January, she said realizes she needs to see her again.

## 2021-06-13 NOTE — PROGRESS NOTES
"Pt here for med management and follow up, accompanied by her . She has c/o depression 5/5 and anxiety 5/5. She denies active SI, but says \"sometimes I wish an accident would happen\". Taking meds as prescribed. Cont to see Loretta LOVELACE as her therapist 2 times a month.   "

## 2021-06-13 NOTE — TELEPHONE ENCOUNTER
IN PROCESSING THIS CORRESPONDENCE THIS WRITER NOTICED THE FAX IS ADDRESSED TO DR ABILIO PETERS.  ALTHOUGH THIS IS A CLINIC PATIENT HERE, LEFT MESSAGE ASKING IF THIS WAS TO GO TO DR PETERS'S OFFICE OR ARE THEY LOOKING FOR DR RUIZ'S INPUT

## 2021-06-13 NOTE — PROGRESS NOTES
Correct pharmacy verified with patient and confirmed in snapshot? [x] yes []no    Charge captured ? [x] yes  [] no    Medications Phoned  to Pharmacy [] yes [x]no  Name of Pharmacist:  List Medications, including dose, quantity and instructions    Medication Prescriptions given to patient   [] yes  [x] no   List the name of the drug the prescription was written for.     Medications ordered this visit were e-scribed.  Verified by order class [x] yes  [] no  Trazodone, risperdal, seroquel x2, benadryl, cymbalta, cogentin  Medication changes or discontinuations were communicated to patient's pharmacy: [] yes  [x] no    UA collected [] yes  [x] no    [place] Prescription Monitoring Program Reviewed? [] yes  [x] no    Referrals were made to:  Higher level of care recommended    Future appointment was made: [x] yes  [] no    Dictation completed at time of chart check: [x] yes  [] no    I have checked the documentation for today s encounters and the above information has been reviewed and completed.

## 2021-06-14 NOTE — TELEPHONE ENCOUNTER
ANTICOAGULATION  MANAGEMENT    Assessment     Today's INR result of 2.3 is Therapeutic (goal INR of 2.0-3.0)        Warfarin taken as previously instructed    No new diet changes affecting INR    No new medication/supplements affecting INR    Continues to tolerate warfarin with no reported s/s of bleeding or thromboembolism     Previous INR was Subtherapeutic    Plan:     Spoke on phone with Johanna regarding INR result and instructed:    Sarah sets up her medications. She did take correct dose.       Warfarin Dosing Instructions:  Continue current warfarin dose 12.5 mg daily on Tuesdays, Thursdays and Saturdays; and 10 mg daily rest of week  (0 % change)    Instructed patient to follow up no later than: two weeks    Education provided: importance of therapeutic range, importance of following up for INR monitoring at instructed interval and importance of taking warfarin as instructed    Sarah verbalizes understanding and agrees to warfarin dosing plan.    Instructed to call the [facility] for any changes, questions or concerns. (#[phone])   ?   Bree Barr RN    Subjective/Objective:      Valentina Olmedo, a 60 y.o. female is on warfarin. Valentina Montgomery reports:     Home warfarin dose: template incorrect; verbally confirmed home dose with Sunshine and updated on anticoagulation calendar     Missed doses: No     Medication changes:  No     S/S of bleeding or thromboembolism:  No     New Injury or illness:  No     Changes in diet or alcohol consumption:  No     Upcoming surgery, procedure or cardioversion:  No    Anticoagulation Episode Summary     Current INR goal:  2.0-3.0   TTR:  27.7 % (1 y)   Next INR check:  2/16/2021   INR from last check:  2.30 (2/2/2021)   Weekly max warfarin dose:     Target end date:     INR check location:     Preferred lab:     Send INR reminders to:  [facility]    Indications    History of pulmonary embolism [Z86.711]           Comments:            Anticoagulation Care Providers     Provider Role Specialty Phone number    Donald Rooney MD Referring Internal Medicine [phone]    Promise Vanegas MD Responsible Family Medicine [phone]

## 2021-06-14 NOTE — PROGRESS NOTES
[facility] Daily Progress     Patient Name: Valentina Olmedo  Date: 12/29/2020  Visit #: 5/12  Referring Provider: Donald Rooney MD  Referring Diagnosis:   Chronic bilateral low back pain with right-sided sciatica [M54.41, G89.29]  - Primary       History of vertebral compression fracture [Z87.81]       Age-related osteoporosis with current pathological fracture with delayed healing, subsequent encounter [M80.00XG]       Vitamin D deficiency [E55.9]       Numbness and tingling of left lower extremity [R20.0, R20.2]       Closed compression fracture of L5 lumbar vertebra with delayed healing, subsequent encounter [S32.050G]       Closed compression fracture of L2 lumbar vertebra with delayed healing, subsequent encounter [S32.020G]         Visit Diagnosis:     ICD-10-CM    1. Chronic bilateral low back pain with right-sided sciatica  M54.41     G89.29    2. History of vertebral compression fracture  Z87.81    3. Age-related osteoporosis with current pathological fracture with delayed healing, subsequent encounter  M80.00XG    4. Generalized muscle weakness  M62.81    5. Physical deconditioning  R53.81    6. Abnormality of gait  R26.9    7. Difficulty balancing  R29.818        Assessment:     Pt demo's increased neck and L knee pain levels today due to sideways fall last night but demo's increased distance with 2 minute walk (still significantly below age/gender norms) and improved B hip strength testing.  Neck ROM is also improved.  Pt reports much greater amounts of walking daily 5-6x/day for smoke break versus being wheeled previously.    FROM LAST VISITS  Pt demo's significant below norm endurance with 2 minute walk test compared to gender/age norms.  Pt demo's good spine ROM.  Pt demo's significant weakness with core/trunk and hip strength.    Patient is benefitting from skilled physical therapy and is making steady progress toward functional goals.  Patient is appropriate to continue with  skilled physical therapy intervention, as indicated by initial plan of care.    Goal Status:   Pt. will demonstrate/verbalize independence in self-management of condition in : 12 weeks;Comment  Comment:: educated on strengthening program for core/trunk and LEs - IMPROVING - pt reports good compliance    Pt. will be able to walk : 10 minutes;around the house;on even surfaces;with less pain;with less difficulty;for household mobility;for community mobility;for exercise/recreation;in 12 weeks;Comment  Comment:: around her apartment building with walker without falling - IMPROVING - is practicing walking 5-6 times/day with smoke break    Patient will stand : 10 minutes;with less pain;with less difficulty;for home chores;for other activities;in 12 weeks - IMPROVING - doing more often but still is painful    Patient will decrease : by _ points;for improved quality of function;for improved quality of life;in 12 weeks  by ___ points: 6      Plan / Patient Education:     Continue with initial plan of care.   Assess response to HEP in 3 weeks after spouse has surgery.  Attempt progressing spinal and B LE strength as able.    Exercises:  Exercise #1: Sit to stand x5-10 reps 1-2x/day  Comment #1: Walking out in hallway with w/c follow and using WW x1-2 minute 1-2x/day  Exercise #2: Scooting forward and backward in W/C down hallway at apartment complex  Comment #2: Supine TA iso x2-3 seconds hold x5-10 reps 1-2x/day  Exercise #3: Changed to hooklying hip abd with L2 band x10 reps  Comment #3: Added seated hip add iso into pillow x2-3 seconds x5-10 reps 1-2x/day  Exercise #4: Seated marching x5-10 reps 1-2x/day  Comment #4: Neck flex, SB and rotation ROM x5-10 reps 1-2x/day  Exercise #5: SLS B x5-10 seconds x5-10 reps each 1-2x/day          Subjective:     Pt reports she was bending over last night to  some medicine that fell on the floor and pt lost her balance and fell into wall. She scrapped her R arm and has bandaids  on her forearm. Pt did hit her head on her lampshade and feels like her neck is more sore today so she is going to maybe see a chiropractor.   Pain rating for neck pain at 6/10 today.   R hand thumb and pointer and L hand thumb and pinkie are sore today too.  Upper back 4/10 - feels sore from fall last night.  Lower back 2-3/10 - not too bad today.  L knee also got twisted and is sore today.  Pt fell into the wall and was able to get herself back upright without help.    Pt has been walking about 50 ft to go outside to have a cigarette about 4-6 x/day - prior to PT starting pt was getting wheeled outside. Pt is also walking around apartment without a walker and doing better.  Pt reports she can get increased pain in back where she broke it and that will cause her to sit down. (had stress fractures L1-2 and L5)  Pt reports she was up to doing 8 reps with her sit to stand but lately she is only able to do 5-6 reps.  Pt reports prior to last night she was feeling like 30-40% better since starting PT. She is very happy with how much more walking she is doing and her spouse is very proud of her.    Patient Outcome Measures:  Modified Oswestry Low Back Pain Disablity Questionnaire  in %: 32     Scores range from 0-100%, where a score of 0% represents minimal pain and maximal function. The minimal clinically important difference is a score reduction of 12%. FROM INITIAL    Objective:     Cervical ROM WNL all directions except L rotation min limited with some pain (last visit all directions min limited)    Sit to stand 9 reps in 30 seconds today (was 8 reps initial visit)    Two Minute Walk Test  Total meters walked: 250 ft (was 222 ft last tested)  AD used? W/W through test  RPE: not any shortness of breath at end of walk today (was hard with breating last time), pain in back 5-6/10 by end of walk - got better after sitting after walking  Pt demo'ed increased ability to  feet today (was shuffling R > L last  visit)  Age gender norm: 578 ft  TWO- MINUTE WALK TEST (2mwt) MEANS   MEAN DISTANCE (FEET) METERS       + cm   18-54 600.4 183 m 0.192  cm   55-59 578.7 176 m 38.776 cm   60-64 545.9 166 m 32.936 cm   65-69 509.2 155 m 20.416 cm   70-74 478.7 145 m 95.9576 cm   75-79 462.3 140 m 90.904 cm   80-85 440.6 136 m 29.488 cm   R.W Marielos et al (2014) Conversion Cheryl Rubiopool PT, CLT-RAMSEY    Seated march R LE with min pain 1st 1-2 reps and then better x10 reps, L LE seated march with increased height and minimal difficulty (was low height and moderate difficulty to perform)    FROM LAST VISITS  Standing R SLS only 1-2 seconds due to increase in R low back and buttock pain, L SLS ~5 seconds with UE support without pain    Trunk flex min limited with stretch only, ext WNL and feels good to perform, B SB WNL but pt feels like she will fall over to the R side  R hip abd strength 3+/5 with increased pain L side, L hip abd 3/5 with minimal pain    Treatment Today      TREATMENT MINUTES COMMENTS   Evaluation     Self-care/ Home management     Manual therapy     Neuromuscular Re-education     Therapeutic Activity     Therapeutic Exercises 54 Reassessed response to HEP and activity level - significant discussed on amount of change pt has been able to make with daily walking with smoke breaks and walking around apartment safely without walker.   Reassessed cervical ROM, B hip strength, 2 minute walk test and sit to stand 30 second assessment and discussed great progress so far with compliance with HEP.  Performed and reviewed HEP per pt instructions and flow sheet and printed AVS for home.   Reviewed how movement and blood flow can help decrease tissue sensitivity.     Gait training     Modality__________________                Total 54    Blank areas are intentional and mean the treatment did not include these items.       Griselda Garcia PT, DPT, OCS, CLT  12/29/2020  1:16 PM

## 2021-06-14 NOTE — TELEPHONE ENCOUNTER
Reason for call:  Medication If this is a refill request, has the caller requested the refill from the pharmacy already?   Yes     Name of the pharmacy and phone number for the current request: [facility] [phone]    Name of the medication requested: Durloxetine    Other request: Received call from Jenna ALVAREZ ([facility]) stated  Requests have been submitted for refills, reported the Pharmacy would have to package the medication.      Phone number to reach: [phone]      Best Time: Anytime     Can we leave a detailed message on this number? Yes

## 2021-06-14 NOTE — PROGRESS NOTES
Assessment/Plan:      Problem List Items Addressed This Visit     Major depressive disorder, recurrent episode, moderate (H) (Chronic)     Continue management by psychiatry.         Epilepsy (H)     Continue care with neurology.         COPD     Well controlled with albuterol as needed. Tobacco cessation recommended, patient not interested in quitting at this time.         Relevant Medications    albuterol (PROAIR HFA;PROVENTIL HFA;VENTOLIN HFA) 90 mcg/actuation inhaler    Hypothyroidism     Labs up to date. Continue levothyroxine.         Mixed hyperlipidemia     Labs up to date. Continue rosuvastatin.         Esophageal reflux     Well controlled. Continue omeprazole.         Stage 3a chronic kidney disease     Smoking cessation recommended but declined. Blood pressure well controlled. Avoid NSAIDS.         Nicotine dependence, cigarettes, with other nicotine-induced disorders     Cessation encouraged. Patient not interested in quitting.         History of pulmonary embolism     Chart reviewed. It appears plan was for long term anticoagulation. Referral placed for continued management by anticoag team.         Relevant Orders    INR    BMI 50.0-59.9, adult (H)    CHRIS (obstructive sleep apnea)    Relevant Medications    Schizoaffective disorder, depressive type (H)     Continue management by psychiatry.           Other Visit Diagnoses     Bee sting allergy    -  Primary    Screening mammogram, encounter for        Relevant Orders    Mammo Screening Bilateral      Multiple medical issues and history reviewed. No significant changes made in her ongoing care. She will continue to follow with her multiple specialists as appropriate. Health maintenance also reviewed and orders placed as indicated.    Patient Instructions   1. No change in current medications.  2. Continue follow up for INR monitoring.  3. You should get a call to schedule mammogram.      Subjective:   Valentina Olmedo is a 60 y.o. female who presents  today to establish with a new primary care provider. The patient is also requesting a new form for a handicapped sticker.     She follows with a psychiatrist for her chronic mental health issues.    She has a history of a seizure disorder. She last had a seizure in 2002. She continues to follow with neurology.    She has a history of COPD. She isn't needing that recently. She reports usually using that 1-2 times per week.     She has acid reflux that has been well controlled with omeprazole.    Patient Active Problem List   Diagnosis     Epilepsy (H)     Migraine Headache     Vitamin D Deficiency     COPD     Hypothyroidism     Mixed hyperlipidemia     Esophageal reflux     Stage 3a chronic kidney disease     Major depressive disorder, recurrent episode, moderate (H)     Anxiety / Depression / Borderline / PTSD - Dr. 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 F/up CT of the abdomen done on 6/26/12 showed a stable 5 mm nodular opacity in the right lower lung lobe, adjacent to the diagram. 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 According to the patient, the tumor was benign. She is not sure whether or not the tumor belonged to the thyroid gland. The surgery was done here at the [facility]. In January 2011, she was diagnosed with kidney stones. She was found to have an elevated calcium level of 11.7, with a PTH level of 368. Stone an     Hyperlipidemia      Hyperparathyroidism, s/p resection adenoma            Hypertension      Hyponatremia      Hypothyroidism      PTSD (post-traumatic stress disorder)      Pulmonary embolism with infarction (H) 1/12/2021     Recurrent otitis media      Seizure disorder (H)      Stroke (H)      Vertigo      Past Surgical History:   Procedure Laterality Date     ADRENALECTOMY Left      CARPAL TUNNEL RELEASE Bilateral      CRANIOTOMY FOR TEMPORAL LOBECTOMY Right     x3 for seizure     DILATION AND CURETTAGE OF UTERUS       LITHOTRIPSY       PARATHYROID GLAND SURGERY      resection of adenoma     REFRACTIVE SURGERY Bilateral      TONSILLECTOMY AND ADENOIDECTOMY       TOTAL THYROIDECTOMY       TOTAL VAGINAL HYSTERECTOMY      39 years old. Benign     Review of System: Relevant items noted in HPI. ROS otherwise negative.     Objective:     Vitals:    01/12/21 1355   BP: 120/86   Pulse: 80   Resp: 20   Temp: 98.6  F (37  C)   TempSrc: Oral   Weight: (!) 339 lb 6 oz (153.9 kg)   Height: 5' 6.5\" (1.689 m)       Physical Exam  Constitutional:       General: She is not in acute distress.  HENT:      Right Ear: Tympanic membrane and ear canal normal.      Left Ear: Tympanic membrane and ear canal normal.   Cardiovascular:      Rate and Rhythm: Normal rate and regular rhythm.   Pulmonary:      Effort: Pulmonary effort is normal.      Breath sounds: Normal breath sounds.   Abdominal:      General: Abdomen is flat.      Palpations: Abdomen is soft.      Tenderness: There is no abdominal tenderness.   Musculoskeletal:      " Right lower leg: No edema.      Left lower leg: No edema.   Neurological:      Mental Status: She is oriented to person, place, and time.      Cranial Nerves: No cranial nerve deficit.

## 2021-06-14 NOTE — TELEPHONE ENCOUNTER
History and Physical      Patient Name: Karime Valerio   Patient ID: [id number]   Sex: Female   YOB: 2015        Visit Date: May 20, 2021    Provider: Shashank Leija MD   Location[place] [facility]   Location Address: [address] [place] [address]; [address]  [place]  [address]   Location Phone: [phone]          Chief Complaint  · 5 year well child visit      History Of Present Illness  The patient is a 5 year old female who is brought to the office by her mother for a well child visit.   Interval History and Concerns  Mom has concerns about sleeping. trouble falling asleep   Development (Used Structured Development Tool)  Developmental milestones assessed:   Builds a tower of 8 small blocks   Copies a cross   Balances on each foot   Can name 4 colors   Hops on one foot   Draws a person with 3 parts   Dresses themselves, including buttons   Plays pretend by themselves and with others   Knows their name, age, and whether they are a boy or a girl   Plays board or card games   Other people can understand what they are saying   Brushes own teeth   Indentifies himself/herself as a girl or a boy     EPSDT (If yes, answer questions regarding lead, anemia, tuberculosis, and dyslipidemia)  EPSDT: No   Lead      Anemia      Tuberculosis                  Dyslipidemia (if strong family history)    City/County/Bottled Water  Are you using bottled, county, or city water City       ____________________________________________________________________________________________  Sleep  She is sleeping trouble falling asleep.   Nutrition  She eats a variety of foods. She drinks 16 ounces of whole milk.     Elimination  She has been potty trained.     She .   Dental Screening  The child has no dental issues,parents are brushing teeth daily.   Growth Chart  Growth Chart Reviewed. (F3)   Immunizations (Alt-V)    Immunizations: Up to date prior to 5 years     Who is calling:  Sarah   Reason for Call:  Haven't gotten a call regarding INR instructions   Date of last appointment with primary care:   Okay to leave a detailed message: Yes    Wants to be sure that home health nurse gets called after every inr.    "         Allergy List  Allergen Name Date Reaction Notes   NO KNOWN DRUG ALLERGIES --  --  --          Review of Systems  · Constitutional  o Denies  o : fever, fussiness, agitation, fatigue, weight changes  · Eyes  o Denies  o : redness, discharge  · HENT  o Denies  o : rhinorrhea, congestion, ear drainage, pulling at ears, mouth sores  · Cardiovascular  o Denies  o : cyanosis, difficulty with feeds  · Respiratory  o Denies  o : cough, wheezing, retractions, increased work of breathing  · Gastrointestinal  o Denies  o : vomiting, diarrhea, constipation, decreased PO intake  · Genitourinary  o Denies  o : hematuria, decreased urine output, discharge  · Integument  o Denies  o : rash, bruising, lesions  · Neurologic  o Denies  o : altered mental status, seizure activity, syncope  · Musculoskeletal  o Denies  o : limp, weakness  · Allergic-Immunologic  o Denies  o : frequent illnesses, allergies      Vitals  Date Time BP Position Site L\R Cuff Size HR RR TEMP (F) WT  HT  BMI kg/m2 BSA m2 O2 Sat FR L/min FiO2        05/20/2021 11:10 AM 92/55 Sitting    93 - R   47lbs 0oz 3'  7.5\" 17.46 0.81 98 %  21%          Physical Examination  · Constitutional  o Appearance  o : active, well developed, well-nourished, well hydrated, alert, well-tended appearance  · Eyes  o Conjunctivae  o : conjunctiva normal, no exudates present  o Sclerae  o : sclerae nonicteric  o Pupils and Irises  o : pupils equal and round, pupils reactive to light bilaterally, symmetric light reflex, abnormal cover/uncover test.  o Eyelids/Ocular Adnexae  o : eyelid appearance normal  · Ears, Nose, Mouth and Throat  o Ears  o :   § External Ears  § : external auditory canals normal  § Otoscopic Examination  § : tympanic membrane normal bilaterally, no PE tubes present  o Nose  o :   § External Nose  § : appearance normal  § Intranasal Exam  § : mucosa within normal limits  o Oral Cavity  o :   § Oral Mucosa  § : mucous membranes moist and " normal  § Lips  § : lip appearance normal  § Teeth  § : normal dentition for age  § Gums  § : gums pink, non-swollen, no bleeding present  § Tongue  § : tongue moist and normal  § Palate  § : hard palate normal, soft palate normal  · Respiratory  o Respiratory Effort  o : breathing unlabored  o Inspection of Chest  o : normal appearance  o Auscultation of Lungs  o : normal breath sounds bilaterally  · Cardiovascular  o Heart  o :   § Auscultation of Heart  § : regular rate, normal rhythm, no murmurs present  · Gastrointestinal  o Abdominal Examination  o : soft and nontender to palpation, nondistended, no masses present, normal bowel sounds  o Liver and spleen  o : no hepatomegaly, spleen not palpable  · Genitourinary  o External Genitalia  o : no inflammation, no adhesions or lesions present, normal developmental appearance for age  o Anus  o : no inflammation or lesions present  · Lymphatic  o Neck  o : no lymphadenopathy present  · Musculoskeletal  o Right Upper Extremity  o : normal range of motion  o Left Upper Extremity  o : normal range of motion  o Right Lower Extremity  o : normal range of motion, normal leg alignment  o Left Lower Extremity  o : normal range of motion, normal leg alignment  · Skin and Subcutaneous Tissue  o General Inspection  o : no rashes present, no lesions present, skin pink, no jaundice  o Digits and Nails  o : no clubbing, cyanosis, or edema present, normal appearing nails  · Neurologic  o Motor Examination  o :   § RUE Motor Function  § : tone normal  § LUE Motor Function  § : tone normal  § RLE Motor Function  § : tone normal  § LLE Motor Function  § : tone normal          Assessment  · Well child check     V20.2/Z00.129  · Counseling on injury prevention     V65.43/Z71.89  · Encounter for childhood immunizations appropriate for age       Encounter for routine child health examination without abnormal findings     V20.2/Z00.129  Encounter for immunization     V20.2/Z23  · Abnormal  vision screen     796.4/H57.9  refer to ophtho  · Insomnia     780.52/G47.00  discussed sleep hygiene methods      Plan  · Orders  o ACO-39: Current medications updated and reviewed (1159F, ) - - 05/20/2021  o Immunization Admin Fee (2+ Injections) ([facility]) (93749) - - 05/20/2021  o OPHTHALMOLOGY CONSULTATION ([facility]) - 796.4/H57.9 - 05/20/2021  o Kinrix Vaccine (DTaP-IPV) (91999) - - 05/20/2021   Vaccine - DTaP; Dose: 0.5; Site: Right Thigh; Route: Intramuscular; Date: 05/20/2021 14:10:00; Exp: 06/04/2022; Lot: [id number]; Mfg: [employer]; TradeName: KINRIX; Administered By: Noreen Cabral MA; Comment: Pt Tolerated well. Left in stable condition  o ProQuad Vaccine, MMRV (58596) - - 05/20/2021   Vaccine - MMRV; Dose: 0.5; Site: Right Thigh; Route: Subcutaneous; Date: 05/20/2021 14:13:00; Exp: 02/16/2022; Lot: [id number]; Mfg: [employer] TradeName: PROQUAD; Administered By: Noreen Cabral MA; Comment: Pt tolerated well. Left in stable condition  · Medications  o Medications have been Reconciled  o Transition of Care or Provider Policy  · Instructions  o Anticipatory guidance given.  o Handout given with age-specific care instructions and safety precautions.  o Counseling given and consent obtained for immunizations.  o Use car seats or booster seats at all times.  o Discussed bicycle safety: wear bicycle helmets whenever riding, parents should set a good example.  o Instructed on nutrition.  o Limit juice to 1-2 cups of day.  o Do not keep guns in the home; if there are guns, use trigger locks and keep the guns in a locked cabinet all times.  o Warned about drowning risks.  o Limit sun exposure, apply sunscreen when the child will spend time in the sun.  o Limit television, encourage physical activity.  o Return for next well child check appointment at 6 years.  o Electronically Identified Patient Education Materials Provided Electronically  · Disposition  o f/u in 1 year  o Care Transition  o ADALBERTO  Sent            Electronically Signed by: Shashank Leija MD -Author on May 24, 2021 01:44:10 PM

## 2021-06-14 NOTE — TELEPHONE ENCOUNTER
I do not know if the additional Seroquel could be causing the dizziness or not.  Is she still getting home therapy?  I would recommend that she work with the therapist regarding her unsteadiness.  Please resume home therapies if she is not currently getting them.  I also note that she has not seen Dr. Rooney in many many months.  She seems quite complicated and needs to follow-up with him soon.

## 2021-06-14 NOTE — PROGRESS NOTES
"Mental Health Visit Note    12/21/2017    Start time: 1:00pm    Stop Time: 1:55pm  Session # 11    Valentina Olmedo is a 57 y.o. female is being seen today for    Chief Complaint   Patient presents with      Treatment Plan     Depression     Anxiety   .     New symptoms or complaints: Some depression in the context of not seeing any family over the holidays.    Functional Impairment:   Personal: 3  Family: 2  Work: 4  Social: 4    Clinical assessment of mental status: Patient's grooming is Well groomed, his attire is Appropriate, and his age appears Appears Stated. Behavior towards examiner is Sullen, motor activity is Retarded, and eye contact is Staring.  Patient's mood is Depressed, and affect is Blunted and Flat.  Speech/language is Delayed/Hesitant, attention is Distractible, and concentration is Brief. Thought process is Slow, thought content is Within noraml and  Within normal.  Patient's orientation is X 3, memory is  Impairment and Recent, judgement is Impairment and Minimal, and estimated intelligence is Below Average. Demonstrated insight is Adequate while fund of knowledge is adequate.    Suicidal/Homicidal Ideation present: None Reported This Session    Patient's impression of their current status:    Patient reports some depressive symptoms in the context of not being invited to any family events over the holidays.  Admits mild and passive suicidal ideation without a plan \"I think about it when I'm down but I would never do it\".  She agrees to safety plan: will call crisis numbers (given) or present to ED if she has impulses to harm herself.  Stress over financial concerns continue nadir when car lease runs out in March when they will need to acquire another vehicle.     Did little in the way of therapy homework: did not work with spouse on a budget for needed savings, looking at cable, cell phones, junk food, but feels they still need to do so. She did get up daily, get dressed, be with spouse in living " room and to activate by going out to stores, about half the time since last session.      Processed negative cognitions, reinforced strengths, validated patient efforts and feelings. Offered empathic listening and reflections and questions intended to evoke change talk, explored needs and resources, and provided emotional support. Discussed again their options to save $ for desired car. We discussed and brainstormed things that she can do with  as a couple to observe the holiday.   Patient will go grocery shopping with spouse, buy a [employer] ham, make cookies.    Patient completed MH assessments for the sake of treatment planning with difficulty, says her concentration is poor, but she is able to complete given time: RUY 7:  RUY-7 Total: 10, PHQ9 :  PHQ-9 Total Score: 14, PANSI Negative:  PANSI Negative average: 2.38 and PANSI Positive:  PANSI positive average: 2.33     We reviewed scores and discussed how scores indicate that she has mild to moderate anxiety and depression, scores have overall gone down through time especially since she's moved to stable housing.  Patient admits that stressors are lower and she is doing better than she used to.  Recognizes that she's not needed to go to the hospital. Agrees to utilize crisis plan as above if she ever has impulses to harm herself.       he agrees to work with spouse on a budget for needed savings, looking at cable, cell phones, junk food.She agrees to get up daily, get dressed, be with spouse in living room and to activate by going out to stores, etc.  She will check with psychiatrist and/or MD on med change if her cognitions continue foggy.     Therapist impression of patients current state: Mood slightly worsened in context of isolation over the holidays, slowed thinking continues, but does not appear overly depressed or anxious.  Continues to be able to maintain minimal community functioning outside of ED and inpatient MH.    Type of psychotherapeutic  "technique provided: Client centered, Solution-focused, CBT and Motivational Interviewing    Progress toward short term goals: Mixed progress: did not work with spouse on a budget for needed savings, looking at cable, cell phones, junk food, but feels they still need to do so. She did get up daily, get dressed, be with spouse in living room and to activate by going out to stores, about half the time since last session \"or a little less\".    Review of long term goals: Not done at today's visit and Date of last review 8/31/2017    Diagnosis:   1. Depression, major, recurrent, moderate    2. Generalized anxiety disorder    3. Borderline personality disorder    4. Chronic post-traumatic stress disorder (PTSD)        Plan and Follow up: Patient will return for follow up in two weeks. She agrees again to work with spouse on a budget for needed savings, looking at cable, cell phones, junk food. She agrees to get up daily, get dressed, be with spouse in living room and to activate by going out to stores, etc.  She will check with psychiatrist and/or MD on med change if her cognitions continue foggy.       Discharge Criteria/Planning: Client has chronic symptoms and ongoing therapy for maintenance stability recommended.    Loretta Barney MSW, LICSW  12/21/2017  "

## 2021-06-14 NOTE — TELEPHONE ENCOUNTER
Reason for Call: Patient has had 2 falls, one on wed and fri of last week. Complains of dizzyness on standing up.    Order or referral being requested:  is recommending a follow up with PCP.    Date needed: asap    Has the patient been seen by the PCP for this problem? YES    Additional comments: patient also sees neurology as well, had a change in medication. Caller states she spoke with them and they do not feel it is the medication causing these symptoms    Phone number Patient can be reached at:  Other phone number:  [phone] [place] [facility] Home care    Best Time:  any    Can we leave a detailed message on this number?  Yes    Call taken on 2/1/2021 at 11:08 AM by Enedina Obrien

## 2021-06-14 NOTE — TELEPHONE ENCOUNTER
Sarah is calling to let us know Valentina had a fall on 1/27, fell from bed hit right knee, now having some back pain.  Wondering if we recommend an appt for tim also asking if we have    Any recommendations to work with Valentina with fall prevention PT or OT.

## 2021-06-14 NOTE — TELEPHONE ENCOUNTER
RN cannot approve Refill Request    RN can NOT refill this medication med is not covered by policy/route to provider. Last office visit: 9/24/2020 Donald Rooney MD Last Physical: 9/13/2018 Last MTM visit: Visit date not found Last visit same specialty: 1/12/2021 Promise Vanegas MD.  Next visit within 3 mo: Visit date not found  Next physical within 3 mo: Visit date not found      Alda Mendez, [facility] Triage/Med Refill 1/13/2021    Requested Prescriptions   Pending Prescriptions Disp Refills     warfarin ANTICOAGULANT (COUMADIN/JANTOVEN) 5 MG tablet [Pharmacy Med Name: Warfarin Sodium 5MG TABS] 56 tablet 0     Sig: TAKE 1 & 1/2 TABLETS - 2 TABLETS BY MOUTH DAILY AS DIRECTED ADJUST DOSE BASED ON INR RESULTS       Warfarin Refill Protocol  Failed - 1/13/2021  3:18 PM        Failed -  Route to appropriate pool/provider     Last Anticoagulation Summary:   Anticoagulation Episode Summary     Current INR goal:  2.0-3.0   TTR:  31.6 % (1 y)   Next INR check:  1/26/2021   INR from last check:  2.50 (1/12/2021)   Weekly max warfarin dose:     Target end date:     INR check location:     Preferred lab:     Send INR reminders to:  [facility]IP[facility]    Indications    History of pulmonary embolism [Z86.711]           Comments:           Anticoagulation Care Providers     Provider Role Specialty Phone number    Donald Ronoey MD Referring Internal Medicine [phone]    Promise Vanegas MD Responsible Family Medicine [phone]                Passed - Provider visit in last year     Last office visit with prescriber/PCP: 9/24/2020 Donald Rooney MD OR same dept: Visit date not found OR same specialty: 1/12/2021 Promise Vanegas MD  Last physical: 9/13/2018 Last MTM visit: Visit date not found    Next appt within 3 mo: Visit date not found Next physical within 3 mo: Visit date not found  Prescriber OR PCP: Donald Rooney MD  Last diagnosis associated with med order: 1. Pulmonary  embolism with infarction (H)  - warfarin ANTICOAGULANT (COUMADIN/JANTOVEN) 5 MG tablet [Pharmacy Med Name: Warfarin Sodium 5MG TABS]; TAKE 1 & 1/2 TABLETS - 2 TABLETS BY MOUTH DAILY AS DIRECTED ADJUST DOSE BASED ON INR RESULTS  Dispense: 56 tablet; Refill: 0    2. Long term (current) use of anticoagulants  - warfarin ANTICOAGULANT (COUMADIN/JANTOVEN) 5 MG tablet [Pharmacy Med Name: Warfarin Sodium 5MG TABS]; TAKE 1 & 1/2 TABLETS - 2 TABLETS BY MOUTH DAILY AS DIRECTED ADJUST DOSE BASED ON INR RESULTS  Dispense: 56 tablet; Refill: 0    If protocol passes may refill for 6 months if within 3 months of last provider visit (or a total of 9 months).

## 2021-06-14 NOTE — PROGRESS NOTES
[facility] Daily Progress     Patient Name: Valentina Olmedo  Date: 1/19/2021  Visit #: 6/12  Referring Provider: Donald Rooney MD  Referring Diagnosis:   Chronic bilateral low back pain with right-sided sciatica [M54.41, G89.29]  - Primary       History of vertebral compression fracture [Z87.81]       Age-related osteoporosis with current pathological fracture with delayed healing, subsequent encounter [M80.00XG]       Vitamin D deficiency [E55.9]       Numbness and tingling of left lower extremity [R20.0, R20.2]       Closed compression fracture of L5 lumbar vertebra with delayed healing, subsequent encounter [S32.050G]       Closed compression fracture of L2 lumbar vertebra with delayed healing, subsequent encounter [S32.020G]         Visit Diagnosis:     ICD-10-CM    1. Chronic bilateral low back pain with right-sided sciatica  M54.41     G89.29    2. History of vertebral compression fracture  Z87.81    3. Age-related osteoporosis with current pathological fracture with delayed healing, subsequent encounter  M80.00XG    4. Generalized muscle weakness  M62.81    5. Physical deconditioning  R53.81    6. Abnormality of gait  R26.9    7. Difficulty balancing  R29.818        Assessment:     Pt joya's significant increase in 2 minute walk test distance today but is still below age/gender norms, and she demo's improved gait quality with step length and decreased trendelenberg.    Pt had decreased ability with sit to stand transfers today due to increased knee pain from recent fall.    FROM LAST VISITS  Pt joya's significant below norm endurance with 2 minute walk test compared to gender/age norms.  Pt joya's good spine ROM.  Pt joya's significant weakness with core/trunk and hip strength.    Patient is benefitting from skilled physical therapy and is making steady progress toward functional goals.  Patient is appropriate to continue with skilled physical therapy intervention, as indicated by initial  plan of care.    Goal Status:   Pt. will demonstrate/verbalize independence in self-management of condition in : 12 weeks;Comment  Comment:: educated on strengthening program for core/trunk and LEs - IMPROVING - pt reports good compliance    Pt. will be able to walk : 10 minutes;around the house;on even surfaces;with less pain;with less difficulty;for household mobility;for community mobility;for exercise/recreation;in 12 weeks;Comment  Comment:: around her apartment building with walker without falling - IMPROVING - is practicing walking 5-6 times/day with smoke break - not too short of breath but does have a new inhaler to use if needed    Patient will stand : 10 minutes;with less pain;with less difficulty;for home chores;for other activities;in 12 weeks - IMPROVING - able to stand longer but eventually her back will get sore - doing more standing with bathroom/brushing teeth/getting ready    Patient will decrease : by _ points;for improved quality of function;for improved quality of life;in 12 weeks  by ___ points: 6      Plan / Patient Education:     Continue with initial plan of care.   Assess response to HEP in 3 weeks - addition of heel raises and LAQ.  Attempt progressing spinal and B LE strength as able.    Exercises:  Exercise #1: Sit to stand x5-10 reps 1-2x/day  Comment #1: Walking out in hallway with w/c follow and using WW x1-2 minute 1-2x/day - 5-6x/day outside for smoke  break  Exercise #2: Scooting forward and backward in W/C down hallway at apartment complex  Comment #2: Supine TA iso x2-3 seconds hold x5-10 reps 1-2x/day  Exercise #3: Changed to hooklying hip abd with L2 band x10 reps  Comment #3: Added seated hip add iso into pillow x2-3 seconds x5-10 reps 1-2x/day  Exercise #4: Seated marching x5-10 reps 1-2x/day  Comment #4: Neck flex, SB and rotation ROM x5-10 reps 1-2x/day  Exercise #5: SLS B x5-10 seconds x5-10 reps each 1-2x/day  Comment #5: ADDED heel raises x10-20 reps and seated LAQ x10  reps each 1-2x/day          Subjective:     Pt reports 2 days ago she slipped off her 's side of the bed when she was trying to plug her phone in and her foot twisted and she fell and hit her R shoulder into the edge of the closet. This is sore today.  Pain rating 1-2/10 for R shoulder  L knee is sore today too - could be from the fall too.  Otherwise pt reports no other falls.     Pt reports since her  is recovery from his surgery she has had to do a lot more for herself. She has been more pushing herself in her chair - mostly backwards is easier.  Pt reports she continues to go walking outside multiple times per day for smoke breaks but will use her chair if it is slippery from weather.    FROM LAST VISITS  Pt has been walking about 50 ft to go outside to have a cigarette about 4-6 x/day - prior to PT starting pt was getting wheeled outside. Pt is also walking around apartment without a walker and doing better.  Pt reports she can get increased pain in back where she broke it and that will cause her to sit down. (had stress fractures L1-2 and L5)  Pt reports she was up to doing 8 reps with her sit to stand but lately she is only able to do 5-6 reps.    Patient Outcome Measures:  Modified Oswestry Low Back Pain Disablity Questionnaire  in %: 32     Scores range from 0-100%, where a score of 0% represents minimal pain and maximal function. The minimal clinically important difference is a score reduction of 12%. FROM INITIAL    Objective:     Cervical ROM WNL all directions with minimal pain and difficulty    R shoulder ROM WNL with minimal pain, R shoulder flexors 4/5 with mild soreness, biceps/triceps 5/5 without pain    Sit to stand 2 reps in 30 seconds today - due to knee pain from fall (was 8-9 reps initial visit)    Two Minute Walk Test  Total meters walked: 360 ft (was 222-250 ft last tested)  AD used? pushed w/c through test  RPE: not any shortness of breath at end of walk today (was hard with  breating last time), pain in back 4-5/10 by end of walk (last time 6-7/10)  Pt demo'ed great stride length and decreased trendelenberg today (was shuffling R > L last visit)  Age gender norm: 578 ft  TWO- MINUTE WALK TEST (2mwt) MEANS   MEAN DISTANCE (FEET) METERS       + cm   18-54 600.4 183 m 0.192  cm   55-59 578.7 176 m 38.776 cm   60-64 545.9 166 m 32.936 cm   65-69 509.2 155 m 20.416 cm   70-74 478.7 145 m 95.9576 cm   75-79 462.3 140 m 90.904 cm   80-85 440.6 136 m 29.488 cm   R.W Marielos et al (2014) Conversion Cheryl Pitts Joana PT, CLT-RAMSEY    FROM LAST VISITS  Standing R SLS only 1-2 seconds due to increase in R low back and buttock pain, L SLS ~5 seconds with UE support without pain    Trunk flex min limited with stretch only, ext WNL and feels good to perform, B SB WNL but pt feels like she will fall over to the R side  R hip abd strength 3+/5 with increased pain L side, L hip abd 3/5 with minimal pain    Treatment Today      TREATMENT MINUTES COMMENTS   Evaluation     Self-care/ Home management     Manual therapy     Neuromuscular Re-education     Therapeutic Activity     Therapeutic Exercises 42 Reassessed response to HEP and activity level - pt appears very motivated to continue working a lot on her walking as she wants to get rid of her w/c.  Reassessed cervical ROM, screened R shoulder ROM and strength, 2 minute walk test and sit to stand 30 second assessment and discussed great progress so far with compliance with HEP.  Performed, reviewed entire HEP and added to HEP per pt instructions and flow sheet and printed AVS for home.      Gait training     Modality__________________                Total 42    Blank areas are intentional and mean the treatment did not include these items.       Griselda Garcia PT, DPT, OCS, CLT  1/19/2021  2:03 PM

## 2021-06-14 NOTE — PROGRESS NOTES
Valentina Olmedo is a 60 y.o. female who is being evaluated via a billable telemedicine visit.    How would you like to obtain your AVS? AVS Preference: Mail a copy.      Medical Decision Making:     Assessment & Plan   Problem List Items Addressed This Visit        Psychiatry Problems    Major depressive disorder, recurrent episode, moderate (H) (Chronic)     Psychological condition is improving with lifestyle modifications.  Continue current treatment regimen.  Psychological condition  will be reassessed at the next regular appointment.         Schizoaffective disorder, depressive type (H)     Psychological condition is unchanged.  Continue current treatment regimen.  Psychological condition  will be reassessed at the next regular appointment.            Other    Nicotine dependence, cigarettes, with other nicotine-induced disorders     Tobacco use is unchanged.  Smoking cessation counseling was provided.  Tobacco use will be reassessed at the next regular appointment.         CHRIS (obstructive sleep apnea)     Sleep study 2/3/21.         Medication side effects     Neurology started lamictal. C/o dizzyness and freq falls. Pt will call neuro.         High risk medication use     High risk medication use requiring monitoring of metabolic labs.  Discussed medication s/e's and risks including TD, metabolic syndrome.  Discus deferred due to pandemic.Today, patient denies bothersome movements.              Review of the result(s) of each unique test - TSH, CMP    Independent interpretation of a test performed by another physician/other qualified health care professional (not separately reported) - CMP, TSH    Tobacco Cessation:   reports that she has been smoking cigarettes. She started smoking about 46 years ago. She has a 8.75 pack-year smoking history. She has never used smokeless tobacco.  The following are part of a depression follow up plan for the patient:  mental health care education      Return in about 9 weeks  "(around 4/6/2021).    Breana Puente MD  [facility]      Plan:       Reviewed risks/benefits of medication with patient.       History of Present Illness:   Valentina Olmedo is a 60 y.o. female here for SCAD, bipolar type, PTSD, Neurocognitive disorder, seizure disorder, hx delirium. Last appt Dec, melatonin increased and Pt agreed to sleep clinic referral.     C/o falls, EDS, chronic LBP. Mood ok.      In-home services:  -PCA  -Med set up q Monday  -Food from [facility], [facility]    Weekly INR draw at clinic (3 min drive).      SOC: , René. Cat, \"Y[place]\".       ROS:  Denies SI/manic/psychotic sx.  See HPI, otherwise negative.      Medications:    Current Outpatient Medications:      albuterol (PROAIR HFA;PROVENTIL HFA;VENTOLIN HFA) 90 mcg/actuation inhaler, Inhale 2 puffs every 4 (four) hours as needed for wheezing., Disp: 1 Inhaler, Rfl: 6     carBAMazepine (TEGRETOL) 200 mg tablet, Take 300-400 mg by mouth see administration instructions. 2 tabs (400mg) q AM, 1.5 tabs (300mg) q PM and HS , Disp: , Rfl:      cholecalciferol, vitamin D3, (VITAMIN D3) 50 mcg (2,000 unit) capsule, Take 1 capsule (2,000 Units total) by mouth daily., Disp: 30 capsule, Rfl: 11     DULoxetine (CYMBALTA) 60 MG capsule, Take 2 capsules (120 mg total) by mouth daily., Disp: 180 capsule, Rfl: 1     EPINEPHrine (EPIPEN) 0.3 mg/0.3 mL injection, Inject 0.3 mL (0.3 mg total) as directed as needed for anaphylaxis. Inject into thigh., Disp: 2 Pre-filled Pen Syringe, Rfl: 3     lamoTRIgine (LAMICTAL) 25 MG tablet, , Disp: , Rfl:      levothyroxine (SYNTHROID, LEVOTHROID) 200 MCG tablet, Take 1 tablet (200 mcg total) by mouth daily., Disp: 90 tablet, Rfl: 3     melatonin 3 mg Tab tablet, Take 2 tablets (6 mg total) by mouth at bedtime as needed., Disp: 180 tablet, Rfl: 1     olopatadine (PATANOL) 0.1 % ophthalmic solution, Administer 1 drop to both eyes 2 (two) times a " day., Disp: 5 mL, Rfl: 1     omeprazole (PRILOSEC) 20 MG capsule, Take 1 capsule (20 mg total) by mouth daily before breakfast., Disp: 90 capsule, Rfl: 3     oxybutynin (DITROPAN XL) 10 MG ER tablet, Take 1 tablet (10 mg total) by mouth daily., Disp: 90 tablet, Rfl: 3     QUEtiapine (SEROQUEL) 200 MG tablet, Take 1 tablet (200 mg total) by mouth at bedtime., Disp: 90 tablet, Rfl: 1     rosuvastatin (CRESTOR) 20 MG tablet, Take 1 tablet (20 mg total) by mouth daily., Disp: 90 tablet, Rfl: 3     SENNA 8.6 mg tablet, TAKE 2 TABLETS (17.2MG) BY MOUTH TWICE A DAY, Disp: 360 tablet, Rfl: 3     UNABLE TO FIND, Calcium and Vit D, Disp: , Rfl:      warfarin ANTICOAGULANT (COUMADIN/JANTOVEN) 5 MG tablet, Take 1.5-2 tablets (7.5-10 mg) daily as directed. Adjust dose per INR results., Disp: 60 tablet, Rfl: 2    Current Facility-Administered Medications:      denosumab 60 mg (PROLIA 60 mg/ml), 60 mg, Subcutaneous, Q6 Months, Alice Rolle MD, 60 mg at 10/05/20 1334       MSE:      Vital signs: not currently breastfeeding.    Alert & oriented x 3.   Appearance: Appears stated age, casually dressed.  Speech: Normal rate, rhythm and tone.  Gait: Normal.  Musculoskeletal: Normal strength, no abnormal movements.  Mood/Affect: Neutral.  Thought Process: Normal rate, logical.  Thought Content: No suicide or homicide ideation.  Associations: Intact, no delusions.  Perceptions: No hallucinations.  Memory: recent and remote memory intact.  Attention span and concentration: normal.  Language: Intact.  Fund of Knowledge: Normal.  Insight and Judgement: Adequate.       Telemedicine Visit Details  Type of service:  Telephone Visit    Originating Location (pt. Location): Home    Distant Location (provider location):  [facility]     Platform used for Video Visit: Unable to complete video visit     This visit started at: 10:00  AM and concluded at: 10:40 AM        Total time spent on this  encounter, on the date of service, including pre-visit review of separately obtained history, face-to-face interaction performing medically appropriate physical exam, patient counseling/education, interpretation of diagnostic results, care coordination and documentation was 40 minutes.            Breana Puente MD

## 2021-06-14 NOTE — TELEPHONE ENCOUNTER
Left detailed message with Sarah notifying her of Dr. Vanegas message.  Also called patient and appointment was made for today with Dr. Vanegas.

## 2021-06-14 NOTE — PROGRESS NOTES
Plan:  1. Pt needs to f/u with Dr Rooney.  2. DC seroquel 12.5mg two times a day prn anxiety due c/o struggling with dizziness and lost of balance.  I doubt this med was cause of fall (small dose), but will dicontinue until seen by PCP.
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
## 2021-06-14 NOTE — PROGRESS NOTES
Assessment/Plan:      Problem List Items Addressed This Visit     BMI 50.0-59.9, adult (H)    Fracture of vertebra due to osteoporosis with routine healing, subsequent encounter     Xrays stable, no worsening of prior compression fractures.           Other Visit Diagnoses     Strain of right shoulder, initial encounter    -  Primary    No evidence of fracture. Continue over the counter medications. Continue physical therapy.    Strain of lumbar region, initial encounter        Stable compression fracture. Continue over the counter medications and physical therapy. Stretching exercises also recommended.    Fall, initial encounter        Relevant Orders    XR Shoulder Right 2 or More VWS (Completed)    XR Lumbar Spine 2 or 3 VWS (Completed)          Patient Instructions   1. Heat to the affected areas followed by stretching exercises twice daily.  2. Continue tylenol as needed.  3. Continue physical therapy.      Return in about 10 days (around 2/8/2021) for recheck if not improving.    Subjective:   Valentina Olmedo is a 60 y.o. female who presents today complaining of a fall last night. She is complaining of pain in her low back and radiating down her right arm. She fell on the knee but is only having pain in that if she presses on it.    Patient Active Problem List   Diagnosis     Epilepsy (H)     Migraine Headache     Vitamin D Deficiency     COPD     Hypothyroidism     Mixed hyperlipidemia     Esophageal reflux     Stage 3a chronic kidney disease     Major depressive disorder, recurrent episode, moderate (H)     Anxiety / Depression / Borderline / PTSD - Dr. 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 F/up CT of the abdomen done on 6/26/12 showed a stable 5 mm nodular opacity in the right lower lung lobe, adjacent to the diagram. 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 According to the patient, the tumor was benign. She is not sure whether or not the tumor belonged to the thyroid gland. The surgery was done here at the [facility]. In January 2011, she was diagnosed with kidney stones. She was found to have an elevated calcium level of 11.7, with a PTH level of 368. Stone an     Hyperlipidemia      Hyperparathyroidism, s/p resection adenoma            Hypertension      Hyponatremia      Hypothyroidism      PTSD (post-traumatic stress disorder)      Pulmonary embolism with infarction (H) 1/12/2021     Recurrent otitis media      Seizure disorder (H)      Stroke (H)      Vertigo      Past Surgical History:   Procedure Laterality Date     ADRENALECTOMY Left      CARPAL TUNNEL RELEASE Bilateral      CRANIOTOMY FOR TEMPORAL LOBECTOMY Right     x3 for seizure     DILATION AND CURETTAGE OF UTERUS       LITHOTRIPSY       PARATHYROID GLAND SURGERY      resection of adenoma     REFRACTIVE SURGERY Bilateral      TONSILLECTOMY AND ADENOIDECTOMY       TOTAL THYROIDECTOMY       TOTAL VAGINAL HYSTERECTOMY      39 years old. Benign       Review of System:  Relevant items noted in HPI. ROS otherwise negative.       Objective:     Vitals:    01/29/21 1333   BP: 122/80   Pulse: 84   Resp: 16   Temp: 98.9  F (37.2  C)   TempSrc: Oral       Physical Exam  Constitutional:       General: She is not in acute distress.     Appearance: She is not ill-appearing.   Cardiovascular:      Rate and Rhythm: Normal rate and regular rhythm.      Heart sounds: No murmur.   Musculoskeletal:      Right shoulder: She exhibits tenderness. She exhibits normal range of motion and no deformity.      Left shoulder: She exhibits normal range of motion, no tenderness and no deformity.      Lumbar back: She exhibits decreased range of motion. She exhibits no tenderness and no bony tenderness.         Xr Lumbar Spine 2 Or 3 Vws    Result Date: 1/29/2021  EXAM: XR LUMBAR SPINE 2 OR 3 VWS LOCATION: [facility] DATE/TIME: 1/29/2021 2:18 PM INDICATION: Unspecified fall, initial encounter COMPARISON: MRI of the lumbar spine 08/14/2020. TECHNIQUE: CR Lumbar Spine.     Osteopenia. Chronic compression fractures of the L1, L2, and L5 vertebral bodies with similar height loss to previous MRI of the lumbar spine. L3 and L4 vertebral body heights are maintained. Lateral alignment of the lumbar vertebral bodies is anatomic. Moderate degenerative facet changes at L4-L5 and L5-S1. Mild lumbar spondylitic changes.    Xr Shoulder Right 2 Or More Vws    Result Date: 1/29/2021  EXAM: XR SHOULDER RIGHT 2 OR MORE VWS LOCATION: [facility] DATE/TIME: 1/29/2021 2:07 PM INDICATION: Fall with pain. COMPARISON: None.     No fracture or dislocation. Mild acromioclavicular osteoarthritis.     Xrays also personally reviewed. Agree with radiology interpretation.

## 2021-06-14 NOTE — TELEPHONE ENCOUNTER
Who is calling:  Sarah Govea [facility]  Reason for Call:  Sarah is calling [facility] regarding patient Warfarin dosing and was transferred directly as she is in home with patient.    Okay to leave a detailed message: Yes

## 2021-06-14 NOTE — PATIENT INSTRUCTIONS - HE
1. Heat to the affected areas followed by stretching exercises twice daily.  2. Continue tylenol as needed.  3. Continue physical therapy.

## 2021-06-14 NOTE — TELEPHONE ENCOUNTER
Patient calling back due to realizing she did write down wrong warfarin dose. Discussed 12.5 mg Tuesdays, Thursdays and Saturdays and 10 mg all other days. She is aware [facility] did talk with [facility] also.

## 2021-06-14 NOTE — TELEPHONE ENCOUNTER
Medication Question or Clarification  Who is calling: Ramiro Douglas  What medication are you calling about (include dose and sig)?:Epipen/child dose  Who prescribed the medication?:   What is your question/concern?:should it be the adult dose  [facility]ed [phone]  Okay to leave a detailed message?:yes, ok to leave a message
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
## 2021-06-14 NOTE — PROGRESS NOTES
"Mental Health Visit Note    11/9/2017    Start time: 1:00pm    Stop Time: 1:55pm  Session # 9    Valentina Olmedo is a 57 y.o. female is being seen today for    Chief Complaint   Patient presents with      Follow Up     Depression     Anxiety     PTSD   .     New symptoms or complaints: None    Functional Impairment:   Personal: 3  Family: 2  Work: 4  Social: 4    Clinical assessment of mental status: Patient's grooming is Well groomed, his attire is Appropriate, and his age appears Appears Stated. Behavior towards examiner is Sullen, motor activity is Retarded, and eye contact is Staring.  Patient's mood is Depressed, and affect is Blunted and Flat.  Speech/language is Delayed/Hesitant, attention is Distractible, and concentration is Brief. Thought process is Slow, thought content is Within noraml and  Within normal.  Patient's orientation is X 3, memory is  Impairment and Recent, judgement is Impairment and Minimal, and estimated intelligence is Below Average. Demonstrated insight is Adequate while fund of knowledge is adequate.    Suicidal/Homicidal Ideation present: None Reported This Session    Patient's impression of their current status:    Patient arrives in wheelchair today with the help of her  who accompanies her to session.   Reports mood is poor in the context of a fall she sustained about 4 days ago.  She is unable to immediately say what kind of injury she sustained, then says she hurt her back. \"I got dizzy.\"   Says her sleep is poor, with difficulty falling asleep and sleeping an average of 6-7 hours/night for about 3-4 months.   Says prior to that she slept for about 8-10 hours.   Patient then says she's having nightmares, without specific themes \"the people aren't friends anymore\".    She did get up daily, get dressed, be with spouse in living room and to activate by going out to stores, about 3 days/week.  Admits there's still some days that she never gets dressed.  She did not check with " psychiatrist or PCP regarding her severe apathy/fatigue reported last time, but today says she thinks it's depression and anxiety.    Processed negative cognitions, reinforced strengths, validated patient efforts and feelings.  We discussed that she will want to report complaints of worsened apathy/depression to psychiatrist.     Therapist impression of patients current state: Improved ability to be up and around at home until the recent fall.  She continues to participate in therapy today only with prompts and questions but seems more able to track conversation than last session.  Continues to be able to maintain mimimumal functioning outside of ED and inpatient MH.    Type of psychotherapeutic technique provided: Client centered, Solution-focused, CBT and Motivational Interviewing    Progress toward short term goals: Progress minimal as usual but did partial homework this week:  She did get up daily, get dressed, be with spouse in living room and to activate by going out to stores, about 3 days/week.  Admits there's still some days that she never gets dressed.     Review of long term goals: Not done at today's visit and Date of last review 8/31/2017    Diagnosis:   1. Generalized anxiety disorder    2. Depression, major, recurrent, moderate    3. Borderline personality disorder    4. Chronic post-traumatic stress disorder (PTSD)        Plan and Follow up: Patient will return for follow up in two weeks. She agrees to get up daily, get dressed, be with spouse in living room and to activate by going out to stores, etc.  She will check with psychiatrist on med change if her severe apathy continues or with MD if she believes it is due to fatigue.     Discharge Criteria/Planning: Client has chronic symptoms and ongoing therapy for maintenance stability recommended.    Loretta Barney MSW, LICSW  11/9/2017

## 2021-06-14 NOTE — TELEPHONE ENCOUNTER
Spoke with nurse [place] and discussed instructions given to patient yesterday was 12.5 mg (2.5 tablets) Tuesdays, Thursdays and Saturdays and 10 mg (2 tablets) all other days. Rechecking INR 2/2/21.

## 2021-06-14 NOTE — TELEPHONE ENCOUNTER
----- Message from Breana Puente MD sent at 1/5/2021  1:18 PM CST -----  RN to call [place] with [facility]. Please tell her: 1. Pt needs to f/u with Dr Rooney.2. DC seroquel 12.5mg two times a day prn anxiety due c/o struggling with dizziness and lost of balance.  I doubt this med was cause of fall (small dose), but will dicontinue until seen by PCP.

## 2021-06-14 NOTE — TELEPHONE ENCOUNTER
Spoke to Sarah BOO and communicated Dr. Tompkins directives.  Faxed Discontinuation order for Seroquel 12.5 mg to [facility] # [phone].

## 2021-06-14 NOTE — PATIENT INSTRUCTIONS - HE
1. No change in current medications.  2. Continue follow up for INR monitoring.  3. You should get a call to schedule mammogram.

## 2021-06-14 NOTE — PROGRESS NOTES
"Mental Health Visit Note    11/22/2017    Start time: 1:00pm    Stop Time: 1:55pm  Session # 10    Valentina Olmedo is a 57 y.o. female is being seen today for    Chief Complaint   Patient presents with      Follow Up     Depression     Anxiety   .     New symptoms or complaints: None    Functional Impairment:   Personal: 3  Family: 2  Work: 4  Social: 4    Clinical assessment of mental status: Patient's grooming is Well groomed, his attire is Appropriate, and his age appears Appears Stated. Behavior towards examiner is Sullen, motor activity is Retarded, and eye contact is Staring.  Patient's mood is Depressed, and affect is Blunted and Flat.  Speech/language is Delayed/Hesitant, attention is Distractible, and concentration is Brief. Thought process is Slow, thought content is Within noraml and  Within normal.  Patient's orientation is X 3, memory is  Impairment and Recent, judgement is Impairment and Minimal, and estimated intelligence is Below Average. Demonstrated insight is Adequate while fund of knowledge is adequate.    Suicidal/Homicidal Ideation present: None Reported This Session    Patient's impression of their current status:    Patient arrives in wheelchair today with the help of her  who accompanies her to session. She explains that she is not using wheelchair at home but does here at  because it's such a long walk from the parking ramp.  She requests he stay for collateral contact since she is \"kind of spacey\".  Describes mood as \"somewhat improved, but still down\".  She describes her thinking as \"spacey\" (is in fact delayed in her speech).   Processes stressors, she says she is concerned about how to save for a down payment on another car when their current car lease ends.    She did get up daily, get dressed, be with spouse in living room and to activate by going out to stores, about 3 days/week.  Did not speak with doctor about symptoms of apathy but now she believes it was depression and " anxiety, which she says is not as bad today.    Processed negative cognitions, reinforced strengths, validated patient efforts and feelings. We problem solved options for patient and as a couple to save needed money.  Discussed priorities, patient able to identify rent and car as largest priorities.  he agrees to work with spouse on a budget for needed savings, looking at cable, cell phones, junk food.She agrees to get up daily, get dressed, be with spouse in living room and to activate by going out to stores, etc.  She will check with psychiatrist and/or MD on med change if her cognitions continue foggy.     Therapist impression of patients current state: Cognitions do appear slowed today, but patient does not appear overly depressed or anxious.  Continues to be able to maintain mimimumal functioning outside of ED and inpatient MH.    Type of psychotherapeutic technique provided: Client centered, Solution-focused, CBT and Motivational Interviewing    Progress toward short term goals: She did get up daily, get dressed, be with spouse in living room and to activate by going out to stores, about 3 days/week.  Admits there's still some days that she never gets dressed.  She did not check with psychiatrist or PCP regarding her severe apathy/fatigue reported last time.    Review of long term goals: Not done at today's visit and Date of last review 8/31/2017    Diagnosis:   1. Depression, major, recurrent, moderate    2. Generalized anxiety disorder    3. Borderline personality disorder        Plan and Follow up: Patient will return for follow up in two weeks. She agrees to work with spouse on a budget for needed savings, looking at cable, cell phones, junk food. She agrees to get up daily, get dressed, be with spouse in living room and to activate by going out to stores, etc.  She will check with psychiatrist and/or MD on med change if her cognitions continue foggy.       Discharge Criteria/Planning: Client has chronic  symptoms and ongoing therapy for maintenance stability recommended.    Loretta Barney MSW, LICSW  11/22/2017

## 2021-06-14 NOTE — TELEPHONE ENCOUNTER
Who is calling:  Valentina  Reason for Call:  Patient called stating she normally takes 10 mg on Sunday but was told 7.5 mg for Sunday, please clarify.  Date of last appointment with primary care: 1/29/2021  Okay to leave a detailed message: Yes

## 2021-06-14 NOTE — TELEPHONE ENCOUNTER
----- Message from Promise Vanegas MD sent at 1/12/2021  2:46 PM CST -----  Regarding: Anticoag episode  Valentina is transferring care to me in [place]. I tried to order anticoag management but can't due to previous episode still being open. Can you please help me with this?    Promise Vanegas MD

## 2021-06-14 NOTE — TELEPHONE ENCOUNTER
I would recommend physical therapy for general deconditioning as well as follow up with her neurologist. I discussed physical therapy with her last week and she reports she is already doing that. Please let me know if you need additional orders from me. Please notify patient and [facility] of this information.

## 2021-06-14 NOTE — TELEPHONE ENCOUNTER
Reason for Call:  Home Health Care    [place] with [facility] called regarding (reason for call): a fall    She had a fall yesterday evening.  Reach to get husbands meds and fell then fell over.  She has a scratch on Left for arm- Unable to see it as it was covered with bandaids.  -scraped herself on the corner of her night stand.  When going down she didn't bump her head.  Ended up bent funny on neck- pain rate 5 out of 10. Doesn't want to take over the counter pain relievers.  She is managing it.     She has been struggling with dizziness and lost of balance.  That can be something Doctor would like to address and call [facility] with any recommendations.       Quetiapine 200 mg for awhile but psychiatrist added an additional 12.5 wondering if that can be causing the dizziness.      Any recommendations   Orders are needed for this patient. na  Pt Provider: Dr. Rooney    Phone Number Homecare Nurse can be reached at: [phone]    Can we leave a detailed message on this number? Yes     Phone number patient can be reached at: Other phone number:  [phone]      Best Time: anytime    Call taken on 12/29/2020 at 10:34 AM by Inna Leblanc

## 2021-06-14 NOTE — TELEPHONE ENCOUNTER
ANTICOAGULATION  MANAGEMENT    Assessment     Today's INR result of 2.5 is Therapeutic (goal INR of 2.0-3.0)        Warfarin taken as previously instructed    No new diet changes affecting INR    No new medication/supplements affecting INR    Continues to tolerate warfarin with no reported s/s of bleeding or thromboembolism     Previous INR was Therapeutic    Plan:     Spoke on phone with Valentina and lesley for nurse Smith regarding INR result and instructed:     Warfarin Dosing Instructions:  Continue current warfarin dose 10 mg daily on tue/thu/sat; and 7.5 mg daily rest of week  (0 % change)    Instructed patient to follow up no later than: two weeks      Valentina verbalizes understanding and agrees to warfarin dosing plan.    Instructed to call the [facility] for any changes, questions or concerns. (#[phone])   ?   Gustavo Gamez RN    Subjective/Objective:      Valentinaleopoldo Olmedo, a 60 y.o. female is on warfarin.     Valentina reports:     Home warfarin dose: as updated on anticoagulation calendar per template     Missed doses: No     Medication changes:  No     S/S of bleeding or thromboembolism:  No     New Injury or illness:  No     Changes in diet or alcohol consumption:  No     Upcoming surgery, procedure or cardioversion:  No    Anticoagulation Episode Summary     Current INR goal:  2.0-3.0   TTR:  31.5 % (1 y)   Next INR check:  1/26/2021   INR from last check:  2.50 (1/12/2021)   Weekly max warfarin dose:     Target end date:     INR check location:     Preferred lab:     Send INR reminders to:  [facility]    Indications    History of pulmonary embolism [Z86.711]           Comments:           Anticoagulation Care Providers     Provider Role Specialty Phone number    Donald Rooney MD Referring Internal Medicine [phone]    Promise Vanegas MD Responsible Family Medicine [phone]

## 2021-06-14 NOTE — TELEPHONE ENCOUNTER
Hi Dr. Vanegas,     - yes.  Not able to order, due to patient is already an existing anticoagulation managed patient.  No need to put in a new INR standing order.     - I switched the responsible doctor to you (Dr. Vanegas), since Valentina is transferring care.     - now you can order INR test.

## 2021-06-15 ENCOUNTER — COMMUNICATION - HEALTHEAST (OUTPATIENT)
Dept: ANTICOAGULATION | Facility: CLINIC | Age: 61
End: 2021-06-15

## 2021-06-15 ENCOUNTER — AMBULATORY - HEALTHEAST (OUTPATIENT)
Dept: LAB | Facility: CLINIC | Age: 61
End: 2021-06-15

## 2021-06-15 DIAGNOSIS — Z86.711 HISTORY OF PULMONARY EMBOLISM: ICD-10-CM

## 2021-06-15 LAB — INR PPP: 1.4 (ref 0.9–1.1)

## 2021-06-15 NOTE — PROGRESS NOTES
Assessment/Plan:      Problem List Items Addressed This Visit     BMI 50.0-59.9, adult (H)    Relevant Orders    Wheelchair, manual Extra Heavy Duty      Other Visit Diagnoses     Falls frequently    -  Primary    Relevant Orders    Wheelchair, manual Extra Heavy Duty    Allergic conjunctivitis, bilateral        Relevant Medications    olopatadine (PATANOL) 0.1 % ophthalmic solution      Agree with need for wheel chair. Patient is able to successfully manage operation of a manual wheelchair and has been doing this with her current chair. Her current chair is falling apart and needs replacement prescription provided to her. Copy also sent to her .      Subjective:   Valentina Olmedo is a 61 y.o. female who presents today requesting a new prescription for a wheelchair. She uses it every day. She uses her walker in the home but sometimes uses the wheel chair in the home as well. She is not able to leave the home without one or the other and often needs the wheelchair to get around at all. Her current chair is falling apart. The patient has been having increased falls in the last few weeks. These started after a medication adjustment by her neurologist and have improved with some recent changes.    Patient Active Problem List   Diagnosis     Epilepsy (H)     Migraine Headache     Vitamin D Deficiency     COPD     Hypothyroidism     Mixed hyperlipidemia     Esophageal reflux     Stage 3a chronic kidney disease     Major depressive disorder, recurrent episode, moderate (H)     Anxiety / Depression / Borderline / PTSD - Dr. 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 F/up CT of the abdomen done on 6/26/12 showed a stable 5 mm nodular opacity in the right lower lung lobe, adjacent to the diagram. 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 According to the patient, the tumor was benign. She is not sure whether or not the tumor belonged to the thyroid gland. The surgery was done here at the [facility]. In January 2011, she was diagnosed with kidney stones. She was found to have an elevated calcium level of 11.7, with a PTH level of 368. Stone an     Hyperlipidemia      Hyperparathyroidism, s/p resection adenoma            Hypertension      Hyponatremia      Hypothyroidism      PTSD (post-traumatic stress disorder)      Pulmonary embolism with infarction (H) 1/12/2021     Recurrent otitis media      Seizure disorder (H)      Stroke (H)      Vertigo      Past Surgical History:   Procedure Laterality Date     ADRENALECTOMY Left      CARPAL TUNNEL RELEASE Bilateral      CRANIOTOMY FOR TEMPORAL LOBECTOMY Right     x3 for seizure     DILATION AND CURETTAGE OF UTERUS       LITHOTRIPSY       PARATHYROID GLAND SURGERY      resection of adenoma     REFRACTIVE SURGERY Bilateral      TONSILLECTOMY AND ADENOIDECTOMY       TOTAL THYROIDECTOMY       TOTAL VAGINAL HYSTERECTOMY      39 years old. Benign  "      Review of Systems      Objective:     Vitals:    02/19/21 1500   BP: 122/80   Pulse: 72   Resp: 16   Temp: 98.1  F (36.7  C)   TempSrc: Oral   Weight: (!) 337 lb 2 oz (152.9 kg)   Height: 5' 6.5\" (1.689 m)       Physical Exam  Constitutional:       General: She is not in acute distress.     Appearance: She is not ill-appearing.   Cardiovascular:      Rate and Rhythm: Normal rate and regular rhythm.   Pulmonary:      Effort: Pulmonary effort is normal.      Breath sounds: Normal breath sounds. No wheezing or rhonchi.   Neurological:      General: No focal deficit present.      Mental Status: She is oriented to person, place, and time.      Cranial Nerves: No cranial nerve deficit.      Comments: Patient able to stand on her own. She is unsteady with ambulation.       "

## 2021-06-15 NOTE — TELEPHONE ENCOUNTER
Detailed message left for sunshine on information below.  Requesting she call back and let us know about equipment for home use.

## 2021-06-15 NOTE — TELEPHONE ENCOUNTER
Please advise if you will write rx for wheel chart, Valentina mock  says dr rouse not needed or order from him.  Fax number [phone]  For

## 2021-06-15 NOTE — PROGRESS NOTES
"Received note from Hamida BOO at [facility] home health regarding potential drug interactions.  I prescribe duloxetine, trazodone, quetiapine.    Neurology prescribes carbamazepine.    I checked [employer]rom[employer] drug interactions and last EKG  (see below):    Concurrent use of CARBAMAZEPINE and QUETIAPINE may result in increased carbamazepine exposure and risk for toxicity and decreased quetiapine efficacy.  Concurrent use of DULOXETINE and TRAZODONE may result in increased risk of serotonin syndrome (hypertension, hyperthermia, myoclonus, mental status changes).     Concurrent use of QUETIAPINE and QT INTERVAL PROLONGING AGENTS may result in increased risk of QT-interval prolongation.    QTC- 485 (10/5/2017).  Has been on same dose of duloxetine, trazodone, quetiapine since 2016 with no reported symptoms of serotonin syndrome.    Plan  1.  Recheck EKG (ordered).    2. RN to:  A) Call pt and tell her to complete EKG.  B) Call Hamida BOO at [facility] and send this note and info on serotonin syndrome.   Recommend Hamida contact patient's neurologist about potential drug-drug interaction with carbamazepine.  C) No med changes at this time.    Patient education: Serotonin syndrome (The Basics)  Written by the doctors and editors at [facility]  What is serotonin syndrome? -- Serotonin syndrome is a serious problem that can cause a number of symptoms, including:  ?Feeling anxious, restless, or confused  ?Sweating  ?Muscle spasms or muscles that cannot relax normally  ?Very fast back-and-forth eye movements  ?Shaking or trembling  ?Fever  ?A fast heartbeat  ?Vomiting  ?Diarrhea  What causes serotonin syndrome? -- Serotonin syndrome can happen to people after they take certain medicines or combinations of medicines. It can also happen with certain herbal products and street drugs. There are many medicines and drugs that can lead to serotonin syndrome. In general, they all affect a chemical in the brain and body called \"serotonin.\"  Examples of the " "medicines and drugs that can cause serotonin syndrome include:  ?Some medicines used to treat depression  ?Some medicines used to treat Parkinson disease, such as selegiline (sample brand name: Eldepryl) and rasagiline (brand name: Azilect)  ?Some pain relievers, such as meperidine (sample brand name: Demerol), tramadol (sample brand name: Ultram), and cyclobenzaprine (sample brand name: Flexeril)  ?Saint Hayder's wort (an herbal medicine sometimes used for depression)  ?Medicines used to treat migraine headaches, called \"triptans\"  ?Some antibiotics, such as linezolid (brand name: Zyvox)  ?Street drugs, such as ecstasy, cocaine, and amphetamines  Doctors do not know why some people get serotonin syndrome and others do not. But they do know that people usually get it within hours of taking a new medicine or drug, a new dose, or a new combination of medicines or drugs.  Should I see a doctor or nurse? -- Yes. If you have symptoms of serotonin syndrome, and you recently took a new drug or medicine or a new dose, call your doctor right away. If your symptoms are severe, go to the emergency room or call for an ambulance (in the [place] and [place], dial [phone]).    Will I need tests? -- Maybe. There is no one test that can show whether or not you have serotonin syndrome. Still, some of the things the doctor will do during the exam can help him or her figure out if you have it. For instance, the doctor might test your leg muscles to see if they spasm. The doctor will also ask a lot of questions about any medicines, herbal products, or street drugs you might have taken.  If you have serotonin syndrome, it's very important that you be honest with your doctor about what you took, how much, and when.  How is serotonin syndrome treated? -- As part of treatment, the doctor will stop the medicines or drugs that caused the serotonin syndrome in the first place. He or she will also monitor your breathing, heart rate, blood pressure, and " body temperature, and try to keep these as close to normal as possible.  Depending on what you need, he or she might also:  ?Give you medicines to calm you  ?Give you medicines to block the effects of serotonin  ?Work with you to decide whether you should keep taking the medicines that caused your serotonin syndrome  Can serotonin syndrome be prevented? -- No, but there are things you can do to protect yourself.  Doctors have no way to predict who will get serotonin syndrome, so it's not possible to prevent cases caused by properly prescribed medicines. Even so, there are things you can do to protect yourself from dangerous reactions to medicines:  ?Always tell any doctor who prescribes medicines for you about all the medicines, herbal products, and street drugs you take. That way, the doctor can be careful not to give you medicines that could cause problems when combined.  ?When starting a new medicine, have the pharmacist check for drug interactions and double-check that you are taking the right amount.  ?If you are already on medicine, do not take a new herbal or over-the-counter medicine without first checking with your doctor, nurse, or pharmacist.

## 2021-06-15 NOTE — TELEPHONE ENCOUNTER
Rx sent for pataday. If not covered, patient will need to check with her insurance in terms of coverage

## 2021-06-15 NOTE — PROGRESS NOTES
"Mental Health Visit Note    1/18/2018    Start time: 3:00pm    Stop Time: 3:55pm  Session #12     Valentina Olmedo is a 57 y.o. female is being seen today for    Chief Complaint   Patient presents with      Follow Up     Depression     Anxiety     Memory Loss   .     New symptoms or complaints: None.    Functional Impairment:   Personal: 3  Family: 2  Work: 4  Social: 4    Clinical assessment of mental status: Patient's grooming is Well groomed, his attire is Appropriate, and his age appears Appears Stated. Behavior towards examiner is Sullen, motor activity is Retarded, and eye contact is Staring.  Patient's mood is Depressed, and affect is Blunted and Flat.  Speech/language is Delayed/Hesitant, attention is Distractible, and concentration is Brief. Thought process is Slow, thought content is Within noraml and  Within normal.  Patient's orientation is X 3, memory is  Impairment and Recent, judgement is Impairment and Minimal, and estimated intelligence is Below Average. Demonstrated insight is Adequate while fund of knowledge is adequate.    Suicidal/Homicidal Ideation present: None Reported This Session    Patient's impression of their current status:    Patient comes to session with her  Jass, who she requests to have present.  She arrives having walked from across the hospital campus rather than insisting on wheelchair as before.  Describes mood as \"I just don't know!\"  Processed a phone call that they received today promising them that they won a lottery for $23,000 and a Mercedes Stan.  They were asked for a payment of $1000 in order to get the money, reduced to $200 when spouse said that's all they could afford.  The caller is supposed to be calling back this afternoon.    Mixed progress on therapy homework: did not work with spouse on a budget for needed savings, looking at cable, cell phones, junk food, but feels they still need to do so (once we re-evaluated the unliklihood of lottery winnings). She " "did get up and dressed most days, spent time with spouse in living room and activated by going out to stores, about half the time since last session. She remains unwilling to read or practice anxiety coping skills apart from very basic deep breathing, counting.    Processed negative cognitions, reinforced strengths, validated patient efforts and feelings. Reviewed info on-line on scam telephone operations. We reviewed ways to stay safe from perpetrators, not giving personal info such as SS#, bank accounts, credit cards, or anything else. Practiced \"no thank you!\" as a way to cut short suspect calls.  Patient admits actually losing close to $1000 on one other occasion when she was told she \"won big\".  Will practice \"No, thank you\" with suspect calls. She agrees again to work with spouse on a budget for needed savings, looking at cable, cell phones, junk food. She agrees to get up daily, get dressed, be with spouse in living room and to activate by going out to stores, etc.  She will check with psychiatrist and/or MD on med change if her cognitions continue foggy.  Will follow up with neuro-psych testing as assisted by clinics.    Therapist impression of patients current state: No acute complaints of depression, anxiety, but at baseline patient consistently reports same. Slowed, slightly confused thinking continues.  Sx do not appear to be getting in the way of stable community living.  Continues to be able to maintain minimal community functioning outside of ED and inpatient MH. Both patient and spouse vulnerable to scam by phone as well as sales pitches (as evidenced by multiple contracts for hi-tech cell phones they don't know how to use, etc.)  I would assess as vulnerable adult but without specific perpetrator there is no reportable incident.  Per psychiatrist request, she was assisted to set up for neuropsych testing with [facility] on March 12.  After that was completed, patient pulls letters from purse " showing that her neurologist Dr. Benitez also wants psychometric testing at their clinic and to expect follow up call. (Again, patient processing is slow, unable to make this connection in session.) She's uncertain if she's been scheduled through there as well but agrees to check with them.  I see her back here in two weeks and will help sort that out as patient appears unable to do so.    Type of psychotherapeutic technique provided: Client centered, Solution-focused, CBT and Motivational Interviewing    Progress toward short term goals: Mixed progress: did not work with spouse on a budget for needed savings, looking at cable, cell phones, junk food, but feels they still need to do so. She did get up daily, get dressed, be with spouse in living room and to activate by going out to stores, about half the time since last session. She remains unwilling to read or practice anxiety coping skills apart from very basic deep breathing, counting.    Review of long term goals: Not done at today's visit and Date of last review 12/21/2017    Diagnosis:   1. Depression, major, recurrent, moderate    2. Generalized anxiety disorder    3. Borderline personality disorder    4. Cognitive deficits        Plan and Follow up: Patient will return for follow up in two weeks. She agrees again to work with spouse on a budget for needed savings, looking at cable, cell phones, junk food. She agrees to get up daily, get dressed, be with spouse in living room and to activate by going out to stores, etc.  She will check with psychiatrist and/or MD on med change if her cognitions continue foggy.  Will follow up with neuro-psych testing as assisted by clinics.      Discharge Criteria/Planning: Client has chronic symptoms and ongoing therapy for maintenance stability recommended.    Loretta Barney MSW, LICSW  1/18/2018

## 2021-06-15 NOTE — PROGRESS NOTES
Correct pharmacy verified with patient and confirmed in snapshot? [x] yes []no    Charge captured ? [x] yes  [] no    Medications Phoned  to Pharmacy [] yes [x]no  Name of Pharmacist:  List Medications, including dose, quantity and instructions      Medication Prescriptions given to patient   [] yes  [x] no   List the name of the drug the prescription was written for.       Medications ordered this visit were e-scribed.  Verified by order class [] yes  [x] no    Medication changes or discontinuations were communicated to patient's pharmacy: [] yes  [x] no    UA collected [] yes  [x] no    [place] Prescription Monitoring Program Reviewed? [] yes  [x] no    Referrals were made to:   [facility]    Future appointment was made: [x] yes  [] no    Dictation completed at time of chart check: [] yes  [x] no    I have checked the documentation for today s encounters and the above information has been reviewed and completed.

## 2021-06-15 NOTE — TELEPHONE ENCOUNTER
ANTICOAGULATION  MANAGEMENT    Assessment     Today's INR result of 2.9 is Therapeutic (goal INR of 2.0-3.0)        Warfarin taken as previously instructed    No new diet changes affecting INR    No new medication/supplements affecting INR    Continues to tolerate warfarin with no reported s/s of bleeding or thromboembolism     Previous INR was Therapeutic    Plan:     Spoke on phone with lesley Montgomery for nurse Smith regarding INR result and instructed:      Warfarin Dosing Instructions:  Continue current warfarin dose 12.5 mg daily on tue; and 10 mg daily rest of week  (0 % change)    Instructed patient to follow up no later than: two weeks     Valentina verbalizes understanding and agrees to warfarin dosing plan.    Instructed to call the [facility] for any changes, questions or concerns. (#[phone])   ?   Gustavo Gamez RN    Subjective/Objective:      Valentina Olmedo, a 61 y.o. female is on warfarin. Valentina Montgomery reports:     Home warfarin dose: as updated on anticoagulation calendar per template     Missed doses: No     Medication changes:  No     S/S of bleeding or thromboembolism:  No     New Injury or illness:  No     Changes in diet or alcohol consumption:  No     Upcoming surgery, procedure or cardioversion:  No    Anticoagulation Episode Summary     Current INR goal:  2.0-3.0   TTR:  30.3 % (1 y)   Next INR check:  3/12/2021   INR from last check:  2.90 (2/26/2021)   Weekly max warfarin dose:     Target end date:     INR check location:     Preferred lab:     Send INR reminders to:  [facility]    Indications    History of pulmonary embolism [Z86.711]           Comments:           Anticoagulation Care Providers     Provider Role Specialty Phone number    Donald Rooney MD Referring Internal Medicine [phone]    Promise Vanegas MD Responsible Family Medicine [phone]

## 2021-06-15 NOTE — TELEPHONE ENCOUNTER
I am happy to write rx. Medicare may require a face to face visit, in which case she will need to schedule. Rx done.

## 2021-06-15 NOTE — PROGRESS NOTES
"  Office Visit - Follow Up   Valentina Olmedo   57 y.o. female    Date of Visit: 1/11/2018    Chief Complaint   Patient presents with     Hypertension        Assessment and Plan   1. Altered mental status  Etiology uncertain.  She is fallen and hit her head concerning for subdural hematoma or other CNS process.  She needs evaluation in the emergency room.    2. Recurrent falls    Return in about 1 week (around 1/18/2018) for recheck.     History of Present Illness   This 57 y.o. old and comes with her  today.  When I entered the room the patient is sitting and staring ahead.  She does not respond or acknowledge questions asked to her.  Surprisingly, her  does not initially respond to questions either.  After about 5 minutes her  finally says that she has been falling.  It appears that she has had multiple falls starting months ago.  She has had numerous times where she has hit her head.  It sounds like she fell this morning.  He reports that she had a change in her cognition this morning.  She was okay yesterday.  He says it is unusual for her to not be able to answer questions.  She was fairly normal yesterday.  She then does start to give short answers.  She states that she has fallen and hit her head.  She denies headache.  She states that she is having a hard time speaking.  She does not know why she is falling.  She denies any weakness numbness or other neurological deficit.  She does not know what day it is, struggles with a month as well as a year and needs prompting to answer his questions.    Review of Systems: A comprehensive review of systems was negative except as noted.     Medications, Allergies and Problem List   Reviewed and updated     Physical Exam   General Appearance:   Altered mental status    /84 (Patient Site: Left Arm, Patient Position: Sitting, Cuff Size: Adult Regular)  Pulse 84  Ht 5' 6\" (1.676 m)  Wt (!) 300 lb (136.1 kg)  SpO2 97%  BMI 48.42 kg/m2    Her " cranial nerves are intact.  She has either a very dry mouth or thrush.  Heart rate controlled no significant murmur gallop or rub lungs clear abdomen obese soft nontender nondistended she is normal strength sensation reflexes in the upper and lower extremities.  She has no pronator drift.  Normal finger-nose-finger.  She ambulates without difficulty.  She is able to name objects and with some difficulty repeat sentences.  She does not know the day of the week and struggles with the month and the year     Additional Information   Current Outpatient Prescriptions   Medication Sig Dispense Refill     albuterol (PROVENTIL HFA;VENTOLIN HFA) 90 mcg/actuation inhaler Inhale 2 puffs every 4 (four) hours as needed for wheezing. 1 Inhaler 6     atorvastatin (LIPITOR) 20 MG tablet Take 1 tablet by mouth every evening for cholesterol 90 tablet 3     benztropine (COGENTIN) 1 MG tablet Take 1 tablet (1 mg total) by mouth daily. 30 tablet 11     carBAMazepine (TEGRETOL) 100 mg chewable tablet Chew 300 mg 2 (two) times a day. In AFTERNOON and BEDTIME       carBAMazepine (TEGRETOL) 200 mg tablet Take 2 tablets by mouth every morning with breakfast (generic. Tegretol) 60 tablet 0     diphenhydrAMINE (BENADRYL) 50 MG tablet Take 1 tablet (50 mg total) by mouth 2 (two) times a day as needed for sleep (anxiety or agitation). 60 tablet 11     DULoxetine (CYMBALTA) 60 MG capsule Take 2 capsules (120 mg total) by mouth daily. 60 capsule 11     enoxaparin (LOVENOX) 300 mg/3 mL Soln Inject 1.4 mL (140 mg total) under the skin every 12 (twelve) hours. 6 mL 0     EPINEPHrine 0.15 mg/0.15 mL atIn Inject 1 each into the shoulder, thigh, or buttocks.       ethotoin (PEGANONE) 250 mg Tab Take 1,000 mg by mouth 3 (three) times a day.       levothyroxine (SYNTHROID, LEVOTHROID) 175 MCG tablet Take 1 tablet by mouth daily 30 tablet 2     losartan (COZAAR) 50 MG tablet Take 1 tablet by mouth daily 90 tablet 2     omeprazole (PRILOSEC) 20 MG capsule  Take 1 capsule (20 mg total) by mouth Daily before breakfast. 30 capsule 11     polyethylene glycol (MIRALAX) 17 gram packet Take 17 g by mouth daily as needed.        QUEtiapine (SEROQUEL) 300 MG tablet Take 1 tablet (300 mg total) by mouth at bedtime. 30 tablet 11     QUEtiapine (SEROQUEL) 50 MG tablet Take 1 tablet (50 mg total) by mouth every morning. 30 tablet 11     risperiDONE (RISPERDAL) 3 MG tablet Take 1 tablet (3 mg total) by mouth at bedtime. 30 tablet 11     senna (SENOKOT) 8.6 mg tablet Take 2 tablets by mouth 2 (two) times a day.       traZODone (DESYREL) 100 MG tablet Take 2 tablets (200 mg total) by mouth at bedtime. 60 tablet 11     VITAMIN D3 2,000 unit capsule Take 1 capsule by mouth daily (vitamin d3) 90 capsule 1     warfarin (COUMADIN) 5 MG tablet Take 10 mg 2 days a week and 7.5 mg the other 5 days 90 tablet 11     No current facility-administered medications for this visit.      Allergies   Allergen Reactions     Aspirin (Tartrazine Only) Shortness Of Breath     Can have asa as long as no yellow dye     Gabapentin Rash     Guilherme Johnsons rash     Venom-Honey Bee Anaphylaxis     Vistaril [Hydroxyzine Pamoate] Other (See Comments)     Excessive sedation     Yellow Dye Shortness Of Breath     Linked only to tartazine.     Hydroxyzine Other (See Comments)     Sluggish; unable to wake up     Latex Itching     Other Environmental Allergy      Dust, Mold, Pollen     Phenobarbital Other (See Comments)     unknown     Latex Itching     Social History   Substance Use Topics     Smoking status: Current Every Day Smoker     Packs/day: 0.50     Years: 35.00     Types: Cigarettes     Start date: 6/10/1974     Smokeless tobacco: Never Used      Comment: smoking 1-2 a day     Alcohol use No       Review and/or order of clinical lab tests:  Review and/or order of radiology tests:  Review and/or order of medicine tests:  Discussion of test results with performing physician:  Decision to obtain old records  and/or obtain history from someone other than the patient:  Review and summarization of old records and/or obtaining history from someone other than the patient and.or discussion of case with another health care provider:  Independent visualization of image, tracing or specimen itself:    Time:      Donald Rooney MD

## 2021-06-15 NOTE — TELEPHONE ENCOUNTER
Patient needs a visit for this per Medicare guidelines. This could be either a video visit or a face to face visit. (Can't be telephone). Please contact her to schedule.

## 2021-06-15 NOTE — TELEPHONE ENCOUNTER
Reason for Call:  Patient had another fall at home. Not sure if patient should come in for a follow up with you since she fell again.    Detailed comments: Sarah calling in to report another fall. Patient is extremely dizzy with the new medications the neurologist has put her on. She is working with the Neurologist on the medication changes and dosages.    Phone Number Patient can be reached at: Other phone number: Sarah, Home Care @ [phone]    Best Time:    Can we leave a detailed message on this number?: Yes    Call taken on 2/4/2021 at 11:22 AM by Iman Oliver

## 2021-06-15 NOTE — TELEPHONE ENCOUNTER
Reason for Call:   olopatadine (PATANOL) 0.1 % ophthalmic solution 5 mL     Pharmacy calling to get a different rx due to insurance coverage.  Detailed comments: pharmacy not sure of any other medications.    Phone Number Patient can be reached at: Other phone number:      Best Time: any    Can we leave a detailed message on this number?: Yes    Call taken on 2/19/2021 at 4:11 PM by Enedina Obrien

## 2021-06-15 NOTE — PROGRESS NOTES
"  Wt on 10/02/17- 303#   Wt on 18- 297#      Date of Service:  2018    Name:  Valentina Olmedo  :  1960  MRN:  [id number]    HPI:   Valentina Olmedo is a 57 y.o. female with  RUY, borderline personality disorder, PTSD, hypothyroid, seizure disorder, major depressive disorder.  She is with , Ayden.  I last saw her in Oct.  At that visit, I recommended PHP or IOP for increasing depression.  I also recommended sleep clinic appointment which she declined.  Today she reports her depression is less.  Sleep is good.  Appetite \"up and down\", occ mild hand tremor (not noted at rest).    Therapist- she has good alliance with Loretta TAMAYO.    Social  Lives in apartment in [place] since . They have 3 cats-Yulisa Camarillo Parker.    Harry Ellis [phone]  Weekly Med set- up:  [phone] ([place])    Medical  R/o CHRIS- sleep study done 2015- \"no significant sleep apnea\".  P.E.- 2017, started on warfarin  Uses wheelchair to ambulate, says she has osteopenia.  Neurologist Praveena Benitez, [facility]          Medications:     Cymbalta 120 mg daily  Trazodone 200 mg daily at bedtime  Seroquel 300mg q hs  Seroquel 50 mg q am  Risperdal 3 mg daily at bedtime  Benadryl 50 mg bid prn anxiety    Cogentin 1 mg/ day  Synthroid 175  g daily  Vit D3 2,000 Units daily    For seizure disorder:   Tegretol 400 mg a.m. 300 mg twice a day  Ethotoin 1000 mg 3 times a day    Coumadin and other meds in EPIC          Associated Clinical Documents:       Notes reviewed in EPIC and [facility] including: medication reconciliation, nurses's notes, progress notes, recent labs, PMH, and OSH records.    ROS:       10 point ROS was negative except for the items listed in HPI.  More alert and less anxiety, no psychotic symptoms      MSE:      Vital signs: refer to nursing notes from today.   Alert & oriented x 3.   Appearance: Appears stated age, casually dressed.  Speech: Slow rate, rhythm and tone.  Gait: " "Normal.  Musculoskeletal: Psychomotor slowing, no abnormal movements.  Mood/Affect: Flat/blunted  Thought Process: slow, circumferential  Thought Content: Occ passive SI, no plan. No homicide ideation.  Associations: Intact, no delusions.  Perceptions: See HPI  Memory: recent and remote memory intact.  Attention span and concentration: Fair  Language: Intact.  Fund of Knowledge: Normal.  Insight and Judgement: Fair    Impression:        1. borderline personality disorder,stable.   2. major depressive disorder, recurrent moderate-stable.  Could benefit from more socialization.  I suggested DTP which she declined, stating:  \"I do not like to listen to other people's problems\".   3.  Adjustment disorder with anxious mood, stable.      Plan:       1. No med changes made today, f/u 3 mos.  2.  Check labs-TSH, BMP, CBC, Vit D  3. Cont therapy.  4. DISCUS- 1 (2/28/17)      Total Time:      25 minutes      Coordination of Care:       25 Minutes spent on this visit with >50% time spent on coordination of care with staff, educating patient and family about diagnosis, treatment options, risks, benefits and side effects of medications, and providing supportive therapy.  Time also spent on reviewing  EHR, documentation and entering orders.      This dictation was completed with speech recognition software and there may be unintended word substitutions.      "

## 2021-06-15 NOTE — TELEPHONE ENCOUNTER
In home PT from [facility] would be very helpful for patient please place referral for this.  Pt currently has walker and wheel chair and Sarah has instructed her to use them. Order for pt need to be sent to   [phone] [facility].  Pt currently has OT and would like to do PT and OT through home care.  Please place orders for both.

## 2021-06-15 NOTE — TELEPHONE ENCOUNTER
Reason for Call: Request for an order or referral:    Order or referral being requested: Need wheelchair order. Current wheelchair is falling apart and need a new one.     Date needed: as soon as possible    Has the patient been seen by the PCP for this problem? YES    Additional comments: Need to send the order to her , Jose Morfin.  's phone number is [phone]. Please mail copy of order to patient's home.       Phone number Patient can be reached at:  Home number on file [phone] (home)    Best Time:      Can we leave a detailed message on this number?  No    Call taken on 2/17/2021 at 1:59 PM by Iman Oliver

## 2021-06-15 NOTE — TELEPHONE ENCOUNTER
I recommend follow up and medication management by her neurologist who is already managing this issue. I don't see how follow up again here would be helpful. I would also recommend a home evaluation for any equipment that may be helpful for the patient. Is Sarah able to do this? Or can physical therapy make a home visit?

## 2021-06-15 NOTE — PROGRESS NOTES
"Pt here with , arrives in  which is new for her. Mood is \"up and down\" r/t holidays. Sleep is good, appetite \"not good\". Pt denies SI, states she has had some \"thoughts\" of SI.   "

## 2021-06-15 NOTE — PROGRESS NOTES
"Mental Health Visit Note    2/1/2018    Start time: 3:11pm    Stop Time: 4pm (49 minutes)  Session #2     Valentina Olmedo is a 57 y.o. female is being seen today for    Chief Complaint   Patient presents with      Follow Up     Depression     Anxiety   .     New symptoms or complaints: None.    Functional Impairment:   Personal: 3  Family: 2  Work: 4  Social: 4    Clinical assessment of mental status: Patient's grooming is Well groomed, his attire is Appropriate, and his age appears Appears Stated. Behavior towards examiner is Sullen, motor activity is Retarded, and eye contact is Staring.  Patient's mood is Depressed, and affect is Blunted and Flat.  Speech/language is Delayed/Hesitant, attention is Distractible, and concentration is Brief. Thought process is Slow, thought content is Within noraml and  Within normal.  Patient's orientation is X 3, memory is  Impairment and Recent, judgement is Impairment and Minimal, and estimated intelligence is Below Average. Demonstrated insight is Adequate while fund of knowledge is adequate.    Suicidal/Homicidal Ideation present: None Reported This Session    Patient's impression of their current status:    Patient presents late  for session saying \"I couldn't get anybody.\" She is apparently confused with another provider who asked her to bring family member to session?   Patient unable to explain her statement further.  Asked about mood, patient states she is \"Just OK\".  She identifies feeling stressed but not knowing why. Upon being pressed somewhat, she says \"just being home alone\".  Asked about that, she says her spouse is there and she's not actually alone.  Processes that paying bills is stressful, they were unable to put any $ away for their down payment on a car.     As per ususal, patient followed up minimally with some of her homework: As agreed, she got up daily, get dressed, be with spouse in living room and to activate by going out to stores at least 1x/week.  " She will check with psychiatrist and/or MD on med change if her cognitions continue foggy.  Willing to follow up with neuro-psych testing as assisted by clinics. She did not work with spouse on a budget for needed savings (looking at cable, cell phones, junk food).    Therapist impression of patients current state:   Mild complaints of depression, anxiety, but at baseline patient consistently reports same. Slowed, slightly confused thinking continues.  Sx do not appear to be getting in the way of stable community living but suspect dependence on spouse.  Continues to be able to maintain minimal community functioning outside of ED and inpatient MH. Writer does expect some level of crisis when leased car is returned if they are without funds to obtain another vehicle.    Processed negative cognitions, reinforced strengths, validated patient efforts and feelings. Per psychiatrist request, she was assisted to set up for neuropsych testing with [facility] on March 12.  Calls placed to office of neurologist Dr. Benitez who also wants psychometric testing at their clinic and to expect follow up call. (Again, patient processing is slow, unable to make this connection in session.)  Patient willing to complete this testing as scheduled at .    Type of psychotherapeutic technique provided: Client centered, Solution-focused, CBT and Motivational Interviewing    Progress toward short term goals: Mixed progress: As agreed, she got up daily, get dressed, be with spouse in living room and to activate by going out to stores at least 1x/week.  She will check with psychiatrist and/or MD on med change if her cognitions continue foggy.  Willing to follow up with neuro-psych testing as assisted by clinics. She did not work with spouse on a budget for needed savings (looking at cable, cell phones, junk food).    Review of long term goals: Not done at today's visit and Date of last review 12/21/2017    Diagnosis:   1. Depression,  major, recurrent, moderate    2. Generalized anxiety disorder    3. Borderline personality disorder    4. Cognitive deficits        Plan and Follow up: Patient will return for follow up in two weeks. She agrees again to work with spouse on a budget for needed savings, looking at cable, cell phones, junk food. She agrees to get up daily, get dressed, be with spouse in living room and to activate by going out to stores, etc.  She will check with psychiatrist and/or MD on med change if her cognitions continue foggy.  Will follow up with neuro-psych testing as assisted by clinics.  Will address need for neuro-psych intake ppwk next session.    Discharge Criteria/Planning: Client has chronic symptoms and ongoing therapy for maintenance stability recommended.    Loretta Barney MSW, LICSW  2/1/2018

## 2021-06-15 NOTE — TELEPHONE ENCOUNTER
"Who is calling:  Patient  Reason for Call:  Asking for call back from [facility], asking if safe to drink energy drink by the name of \"Juice Monster\", patient states flavor might come in chocolate, and is un-carbonated. Please discuss with patient.  Date of last appointment with primary care:   Okay to leave a detailed message: Yes      "

## 2021-06-16 NOTE — PROGRESS NOTES
Valentina Olmedo is a 61 y.o. female who is being evaluated via a billable telemedicine visit.    How would you like to obtain your AVS? AVS Preference: Mail a copy.      Medical Decision Making:     Assessment & Plan   Problem List Items Addressed This Visit        Psychiatry Problems    Schizoaffective disorder, depressive type (H)     Psychological condition is stable.  Continue current treatment regimen.  Psychological condition will be reassessed at the next regular appointment.         Relevant Medications    QUEtiapine (SEROQUEL) 200 MG tablet    DULoxetine (CYMBALTA) 60 MG capsule       Other    Nonintractable epilepsy without status epilepticus (H)     Managed by Vikki Benitez MD, [facility]. Next appt 4/13.  C/o dizziness with Lamictal.  Frequent falls.         Nicotine dependence, cigarettes, with other nicotine-induced disorders    Relevant Medications    DULoxetine (CYMBALTA) 60 MG capsule    CHRIS (obstructive sleep apnea)     Sleep study completed.  Using C-PAP.         High risk medication use - Primary     Discussed medication s/e's and risks including TD, metabolic syndrome.  Discus deferred due to pandemic.Today, patient denies bothersome movements.           Other Visit Diagnoses     Insomnia, unspecified type        Relevant Medications    melatonin 3 mg Tab tablet         Review of the result(s) of each unique test - TSH, CMP    Independent interpretation of a test performed by another physician/other qualified health care professional (not separately reported) - CMP, TSH    Tobacco Cessation:   reports that she has been smoking cigarettes. She started smoking about 46 years ago. She has a 8.75 pack-year smoking history. She has never used smokeless tobacco.  The following are part of a depression follow up plan for the patient:  mental health care education      Return in about 12 weeks (around 6/29/2021).    Brenaa Puente MD  [facility]  "[facility]ICES      Plan:      Therapy appointment with Loretta.  Engaging in PT for strength/balance.       History of Present Illness:   Valentina Olmedo is a 61 y.o. female here for SCAD, bipolar type, PTSD, Neurocognitive disorder, seizure disorder, hx delirium. Last appt Feb.     C/o frequent falls, Mood ok.      In-home services:  -[facility]  -Med set up q Monday  -Food from [facility] meals, [facility]    Weekly INR draw at clinic (3 min drive).      SOC: , René. Cat, \"Yosi\".       ROS:  Denies SI/manic/psychotic sx.  See HPI, otherwise negative.      Medications:    Current Outpatient Medications:      albuterol (PROAIR HFA;PROVENTIL HFA;VENTOLIN HFA) 90 mcg/actuation inhaler, Inhale 2 puffs every 4 (four) hours as needed for wheezing., Disp: 1 Inhaler, Rfl: 6     carBAMazepine (TEGRETOL) 200 mg tablet, Take 300-400 mg by mouth see administration instructions. 200mg in AM, 300mg at 2:00PM and 300mg at HS., Disp: , Rfl:      cholecalciferol, vitamin D3, (VITAMIN D3) 50 mcg (2,000 unit) capsule, Take 1 capsule (2,000 Units total) by mouth daily., Disp: 30 capsule, Rfl: 11     EPINEPHrine (EPIPEN) 0.3 mg/0.3 mL injection, Inject 0.3 mL (0.3 mg total) as directed as needed for anaphylaxis. Inject into thigh., Disp: 2 Pre-filled Pen Syringe, Rfl: 3     lamoTRIgine (LAMICTAL) 25 MG tablet, 200 mg. 200mg in AM and 400mg in PM, Disp: , Rfl:      levothyroxine (SYNTHROID, LEVOTHROID) 200 MCG tablet, Take 1 tablet (200 mcg total) by mouth daily., Disp: 90 tablet, Rfl: 3     melatonin 3 mg Tab tablet, Take 2 tablets (6 mg total) by mouth at bedtime as needed., Disp: 180 tablet, Rfl: 1     olopatadine (PATADAY) 0.2 % Drop, Administer 1 drop to both eyes daily., Disp: 1 Bottle, Rfl: 5     olopatadine (PATANOL) 0.1 % ophthalmic solution, Administer 1 drop to both eyes 2 (two) times a day., Disp: 5 mL, Rfl: 1     omeprazole (PRILOSEC) 20 MG capsule, Take 1 capsule (20 mg total) by mouth daily before breakfast., Disp: 90 " capsule, Rfl: 3     oxybutynin (DITROPAN XL) 10 MG ER tablet, Take 1 tablet (10 mg total) by mouth daily., Disp: 90 tablet, Rfl: 3     QUEtiapine (SEROQUEL) 200 MG tablet, Take 1 tablet (200 mg total) by mouth at bedtime., Disp: 90 tablet, Rfl: 1     rosuvastatin (CRESTOR) 20 MG tablet, Take 1 tablet (20 mg total) by mouth daily., Disp: 90 tablet, Rfl: 3     SENNA 8.6 mg tablet, TAKE 2 TABLETS (17.2MG) BY MOUTH TWICE A DAY, Disp: 360 tablet, Rfl: 3     UNABLE TO FIND, 2 (two) times a day. Calcium and Vit D , Disp: , Rfl:      warfarin ANTICOAGULANT (COUMADIN/JANTOVEN) 5 MG tablet, TAKE 1 & 1/2 TABLETS - 2 TABLETS BY MOUTH DAILY AS DIRECTED ADJUST DOSE BASED ON INR RESULTS, Disp: 180 tablet, Rfl: 1     DULoxetine (CYMBALTA) 60 MG capsule, Take 2 capsules (120 mg total) by mouth daily., Disp: 180 capsule, Rfl: 1    Current Facility-Administered Medications:      denosumab 60 mg (PROLIA 60 mg/ml), 60 mg, Subcutaneous, Q6 Months, Alice Rolle MD, 60 mg at 10/05/20 1334       MSE:      Vital signs: not currently breastfeeding.    Alert & oriented x 3.   Appearance: Not observed  Speech: Normal rate, rhythm and tone.  Gait: Not observed  Musculoskeletal: Not observed  Mood/Affect: Neutral.  Thought Process: Normal rate, logical.  Thought Content: No suicide or homicide ideation.  Associations: Intact, no delusions.  Perceptions: No hallucinations.  Memory: recent and remote memory intact.  Attention span and concentration: normal.  Language: Intact.  Fund of Knowledge: Normal.  Insight and Judgement: Adequate.       Telemedicine Visit Details  Type of service:  Telephone Visit    Originating Location (pt. Location): Home    Distant Location (provider location):  [facility]     Platform used for Video Visit: Unable to complete video visit     This visit started at: 10:00  AM and concluded at: 10:40 AM        Total time spent on this encounter, on the date of  service, including pre-visit review of separately obtained history, face-to-face interaction performing medically appropriate physical exam, patient counseling/education, interpretation of diagnostic results, care coordination and documentation was 40 minutes.            Breana Puente MD

## 2021-06-16 NOTE — PROGRESS NOTES
"Patient called at home due to no show today, she actually late canceled one appt and made a later one which she then also late canceled.  Patient argues \"I called about to cancel\" (obviously has cold congestion), she was advised that she doesn't need to come when she's sick but frequent late cancels as of late are a problem.  Advised that she needs to cancel with 24 hours notice if not urgently sick on day of appt. Offered change in scheduling, less frequent visits, etc if she needs that to accomodate her care.  She agrees to come next time and to discuss what would best fit her needs and reduce late cancel/no shows.  Loretta Barney, BELKIS    "

## 2021-06-16 NOTE — TELEPHONE ENCOUNTER
Telephone Encounter by Zari Maria RN at 3/13/2020  4:32 PM     Author: Zari Maria RN Service: -- Author Type: Registered Nurse    Filed: 3/13/2020  5:26 PM Encounter Date: 3/13/2020 Status: Attested    : Zari Maria RN (Registered Nurse)    Related Notes: Original Note by Zari Maria RN (Registered Nurse) filed at 3/13/2020  5:24 PM    Cosigner: Donald Rooney MD at 3/16/2020  8:51 AM    Attestation signed by Donald Rooney MD at 3/16/2020  8:51 AM    x                ANTICOAGULATION  MANAGEMENT    Assessment     Today's INR result of 5.3 is Supratherapeutic (goal INR of 2.0-3.0)        Warfarin taken as previously instructed    Not consistent with greens/vitamin K intake may be affecting INR    New prescription today: Bactrim 800-160 two times a day for 7 days - patient stated that she is on her way to  prescription.    Potential interaction between Bactrim and warfarin which may affect subsequent INRs    Continues to tolerate warfarin with no reported s/s of  thromboembolism     OV with PCP today for vaginal bleeding; left flank pain    Previous INR was Supratherapeutic on 2/11    Plan:     Spoke with Valentina regarding INR result and instructed:     Warfarin Dosing Instructions:  Hold warfarin today, Sat and Sun     Instructed patient to follow up no later than: 3/16 Appointment made- discussed the importance of having INR checked as recommended.    Education provided: impact of vitamin K foods on INR, importance of therapeutic range, target INR goal and significance of current INR result, importance of taking warfarin as instructed, potential interaction between warfarin and Bactrim, monitoring for bleeding signs and symptoms and when to seek medical attention/emergency care   Instructed to seek medical attention for any incidence of fall with head injury    Valentina verbalizes understanding and agrees to warfarin dosing plan.    Instructed to call the [facility]  for any changes, questions or concerns. (#[phone])   ?   Zari Maria RN    Subjective/Objective:      Valentina Olmedo, a 60 y.o. female is on warfarin.     Valentina reports:     Home warfarin dose: verbally confirmed home dose with Valentina and updated on anticoagulation calendar     Missed doses: No     Medication changes:  No     S/S of bleeding or thromboembolism:  Yes: see above.     New Injury or illness:  Yes: see above     Changes in diet or alcohol consumption:  Yes- per patient she was not consistent with greens/vit k intake.     Upcoming surgery, procedure or cardioversion:  No    Anticoagulation Episode Summary     Current INR goal:   2.0-3.0   TTR:   45.5 % (1 y)   Next INR check:   3/16/2020   INR from last check:   5.30! (3/13/2020)   Weekly max warfarin dose:      Target end date:      INR check location:      Preferred lab:      Send INR reminders to:   [facility]    Indications    Pulmonary embolism with infarction (H) [I26.99]           Comments:            Anticoagulation Care Providers     Provider Role Specialty Phone number    Donald Rooney MD Referring Internal Medicine [phone]

## 2021-06-16 NOTE — PROGRESS NOTES
Assessment/Plan:      Problem List Items Addressed This Visit     None      Visit Diagnoses     Pain in both hands    -  Primary    Relevant Orders    Ambulatory referral to Orthopedics    Ambulatory referral to PT/OT    Chronic right shoulder pain        Relevant Orders    Ambulatory referral to Orthopedics    Ambulatory referral to PT/OT    Other osteoarthritis of spine, cervical region        Relevant Orders    Ambulatory referral to PT/OT    Spondylosis of lumbosacral region, unspecified spinal osteoarthritis complication status        Relevant Orders    Ambulatory referral to PT/OT      Osteoarthritis of the hands suspected. She also has chronic pain in the right shoulder that doesn't seem to be specifically related to the hand issue. Discussed management options  including over the counter medications, orthopedist evaluation and physical therapy. She has not found over the counter medications helpful and she would like to proceed with both orthopedic evaluation and PT. Orders placed.    Patient Instructions   1. Continue current medications.  2. Continue with physical therapy.  3. You should get a call to schedule with orthopedist.  4. Voltaren gel over the counter as needed.        Subjective:   Valentina Olmedo is a 61 y.o. female who presents today with concerns about ongoing pain in both of her hands. This has been bothering her for about a year. The pain is worse in the right hand. She describes the pain as diffuse pain in both of her hands. She reports the pain radiates all the way up to her shoulder on the right side. No injury that she is aware of. She has been taking tylenol for pain without improvement. The pain is there constantly. She reports that it does wax and wane. She hasn't been able to identify any particular exacerbating or relieving activity.    Patient Active Problem List   Diagnosis     Epilepsy (H)     Migraine Headache     Vitamin D Deficiency     COPD     Hypothyroidism     Mixed  hyperlipidemia     Esophageal reflux     Stage 3a chronic kidney disease     Major depressive disorder, recurrent episode, moderate (H)     Anxiety / Depression / Borderline / PTSD - Dr. 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 F/up CT of the abdomen done on 6/26/12 showed a stable 5 mm nodular opacity in the right lower lung lobe, adjacent to the diagram. 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 According to the patient, the tumor was benign. She is not sure whether or not the tumor belonged to the thyroid gland. The surgery was done here at the [facility]. In January 2011, she was diagnosed with kidney stones. She was found to have an elevated calcium level of 11.7, with a PTH level of 368. Stone an     Hyperlipidemia      Hyperparathyroidism, s/p resection adenoma            Hypertension      Hyponatremia      Hypothyroidism      PTSD (post-traumatic stress disorder)       "Pulmonary embolism with infarction (H) 1/12/2021     Recurrent otitis media      Seizure disorder (H)      Stroke (H)      Vertigo      Past Surgical History:   Procedure Laterality Date     ADRENALECTOMY Left      CARPAL TUNNEL RELEASE Bilateral      CRANIOTOMY FOR TEMPORAL LOBECTOMY Right     x3 for seizure     DILATION AND CURETTAGE OF UTERUS       LITHOTRIPSY       PARATHYROID GLAND SURGERY      resection of adenoma     REFRACTIVE SURGERY Bilateral      TONSILLECTOMY AND ADENOIDECTOMY       TOTAL THYROIDECTOMY       TOTAL VAGINAL HYSTERECTOMY      39 years old. Benign     Review of System: Relevant items noted in HPI. ROS otherwise negative.     Objective:     Vitals:    03/23/21 1359   BP: 136/80   Pulse: 72   Resp: 16   Temp: 98.1  F (36.7  C)   TempSrc: Oral   Weight: (!) 335 lb 4 oz (152.1 kg)   Height: 5' 6.5\" (1.689 m)       Physical Exam  Constitutional:       General: She is not in acute distress.     Appearance: She is not ill-appearing.   Musculoskeletal:      Right shoulder: She exhibits decreased range of motion (due to pain) and tenderness (anterior shoulder). She exhibits no deformity.      Right hand: She exhibits tenderness (diffuse pain with palpation, no particular pattern). She exhibits normal range of motion, normal capillary refill, no deformity and no swelling. Normal sensation noted. Normal strength noted.      Left hand: She exhibits tenderness (difuse pain with palpation, no particular pattern). She exhibits normal range of motion, normal capillary refill, no deformity and no swelling. Normal sensation noted. Normal strength noted.       Results for orders placed or performed during the hospital encounter of 12/10/19   Basic Metabolic Panel   Result Value Ref Range    Sodium 141 136 - 145 mmol/L    Potassium 3.5 3.5 - 5.0 mmol/L    Chloride 109 (H) 98 - 107 mmol/L    CO2 24 22 - 31 mmol/L    Anion Gap, Calculation 8 5 - 18 mmol/L    Glucose 99 70 - 125 mg/dL    Calcium 8.8 8.5 - 10.5 " mg/dL    BUN 6 (L) 8 - 22 mg/dL    Creatinine 1.40 (H) 0.60 - 1.10 mg/dL    GFR MDRD Af Amer 47 (L) >60 mL/min/1.73m2    GFR MDRD Non Af Amer 39 (L) >60 mL/min/1.73m2

## 2021-06-16 NOTE — PROGRESS NOTES
[facility] Daily Progress     Patient Name: Valentina Olmedo  Date: 3/20/2018  Visit #: 4  PTA visit #:    Referral Diagnosis: balance  Referring provider: Donald Rooney MD  Visit Diagnosis:     ICD-10-CM    1. Gait instability R26.81    2. Generalized muscle weakness M62.81    3. Decreased strength, endurance, and mobility R53.1     Z74.09          Assessment:     Patient is benefitting from skilled physical therapy and is making steady progress toward functional goals.  Patient is appropriate to continue with skilled physical therapy intervention, as indicated by initial plan of care.     Pt has shown improvements with her general tolerance to exercise and strength. She continues to fatigue quickly with general activity, but improving. Pt has been able to progress to standing exercises and tolerating well. Dynamic balance activities are more challenging, and pt fatigued more quickly with these. Pt only has one more PT session scheduled, but would benefit from further sessions. Encouraged pt to schedule more sessions to improve her balance and strength.     Goal Status:  Pt. will be independent with home exercise program in : 4 weeks  Pt will: be able to increase APTA sit-to-stand by 2+ to demonstrate improved function within 6 weeks.  Pt will: decrease TUG by 2 seconds or more to demonstrate improved mobility within 5 weeks.  Pt will: improve 2 minute walk test of 5+ meters in 2 minutes withni 6 weeks.  Pt will: require no assistance of arms with sit-to-stand within 5 weeks.    Plan / Patient Education:     Continue with initial plan of care.  Progress with home program as tolerated.     Next session: continue balance (static with head turns, foam) progress to dynamic stepping, progress mat exercises for general strengthening, continue standing exercises     Subjective:     Pain Rating: not reported today  Pt shares that she is more tired today. She has had many appointments this morning so she  is feeling run down. She has been trying to do the exercises multiple times per day.       Objective:     Balance  - lateral walking x4 for 10'  - standing with toes on slant board: x3 for 30 seconds  - forward steps over dowel: x15 B  - lateral steps over dowel: x15 B  - normal stance on black foam: 2 x60 seconds  - on black foam, reaching for ball in random directions: 2 x30 seconds  - on black foam: catching/throwing ball x60 seconds    Less fatigue throughout session    Tightness noted in B gastroc and hamstrings with general exercises today    Exercises:  Exercise #1: sit-to-stands: x7 in 30 seconds  Comment #1: NuStep: 8 minutes, L1.0  Exercise #2: LTRs: on hold  Comment #2: SLRs: x8 B, HEP  Exercise #3: adductor ball squeeze: x10, 5 sec holds  Comment #3: seated knee extensions: x10 B  Exercise #4: TA sets: x10 5 sec holds  Comment #4: bent knee fallouts: OTB x10 B, HEP  Exercise #5: seated rotations: light x15  Comment #5: standing hip abduction: x15 B  Exercise #6: standing marching : x10 B  Comment #6: mini-squats: x15  Exercise #7: step-ups: 5'' x10 B  Comment #7: seated calf stretch with green band: x3 B with 15 sec holds        Treatment Today     TREATMENT MINUTES COMMENTS   Evaluation     Self-care/ Home management     Manual therapy     Neuromuscular Re-education 40 See balance in objective.   Therapeutic Activity     Therapeutic Exercises 15 See exercises. Added SLRs and bent-knee fallouts to HEP.   Gait training     Modality__________________                Total 55    Blank areas are intentional and mean the treatment did not include these items.       Evans Calderon, PT, DPT  3/20/2018

## 2021-06-16 NOTE — TELEPHONE ENCOUNTER
Telephone Encounter by Eleanor Coombs RN at 7/15/2019  2:28 PM     Author: Eleanor Coombs RN Service: Behavioral Author Type: Registered Nurse    Filed: 7/15/2019  2:33 PM Encounter Date: 7/15/2019 Status: Signed    : Eleanor Coombs RN (Registered Nurse)       Date of Last Office Visit: 5/7/19  Date of Next Office Visit: 8/20/19  No shows since last visit: 7/9/19   Cancellations since last visit: 0  ED visits since last visit:  0    Medication senna 8.6 mg date last ordered: 3/12/19  Qty: 120  Refills: 0    Lapse in therapy greater than 7 days: Yes  Medication refill request verified as identical to current order: Yes  Result of Last DAM, VPA, Li+ Level, CBC, or Carbamazepine Level (at or since last visit): N/A     [] Medication refilled per [facility] M-1.   [x] Medication unable to be refilled by RN due to criteria not met as indicated below:     []Eligibility - not seen in last year    []Supervision - no future appointment    [x]Compliance - therapy lapse    []Verification - order discrepancy    []Controlled Medication    [x]Medication not included in RN Protocol    []90 - day supply request    []Other     Current Medication list:    Medication Plan of Care at last office visit with MD/CNP:

## 2021-06-16 NOTE — PROGRESS NOTES
Assessment/Plan:         Visit Diagnoses     Black stools    -  Primary    Relevant Orders    Hemoglobin (Completed)    Occult Blood(ICT) (Completed)    Elevated INR            Patient Instructions   Please hold coumadin today.  Further dosing of coumadin as per INR nurse.  Please proceed to the ER if increasing symptoms over the weekend.  Follow up appointment with Dr. Rooney next week.     INR is normal and stool guaiac is negative.  No focal neurologic findings.  She should continue dosing of her Coumadin as per INR nurse.  She is scheduled to follow-up with her primary care physician next week.    The patient did have an incident of fall when she was here in the clinic.  She had been on the table for an exam.  I helped her off the table and verified with her that she was feeling steady.  I was leaving the room so she can get dressed when she called out that she was falling.  I was able to catch the edge of her shirt that she fell and slowed her fall some.  She reports that she did not trip but just felt unsteady.  She landed on her right buttocks and also did strike her left elbow against the wall.  There was no bruising or swelling of the elbow and it was nontender to palpation.  She did not hit her head.  Incident report was filed.    Subjective:   Valentina Olmedo is a 57 y.o. female who presents today to be checked due to elevated INR.  She had an INR checked today and it was elevated at 5.7.  She reports that she has been having dark red/black stools for many months.  No change in her stool pattern. No increase in frequency of stools. The patient is also complaining of dizziness that has been going on for several months. No new symptoms at this time.  Last INR was 2.2 one month ago.  No chest pain.  No shortness of breath.  She does feel unsteady on her feet but no other neurologic symptoms and no change in her symptoms.    Patient Active Problem List   Diagnosis     Epilepsy - Dr. 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 Simmons     Nicotine dependence, cigarettes, with other nicotine-induced disorders     Pulmonary embolism with infarction     Morbid obesity with BMI of 45.0-49.9, adult        Past Medical History:   Diagnosis Date     Anxiety      Arthritis      Borderline personality disorder      Cancer      COPD (chronic obstructive pulmonary disease)      Depression      Hyperlipidemia      Hypertension      Hyponatremia      Hypothyroidism      PTSD (post-traumatic stress disorder)      Recurrent otitis media      Seizure disorder      Stroke      Vertigo         Past Surgical History:   Procedure Laterality Date     ADRENALECTOMY Left      CARPAL TUNNEL RELEASE Bilateral      CRANIOTOMY FOR TEMPORAL LOBECTOMY Right     x3 for seizure     DILATION AND CURETTAGE OF UTERUS       LITHOTRIPSY       PARATHYROID GLAND SURGERY      resection of adenoma     REFRACTIVE SURGERY Bilateral      TONSILLECTOMY AND ADENOIDECTOMY       TOTAL THYROIDECTOMY       VAGINAL HYSTERECTOMY  ?          Current Outpatient Prescriptions:      albuterol (PROVENTIL HFA;VENTOLIN HFA) 90 mcg/actuation inhaler, Inhale 2 puffs every 4 (four) hours as needed for wheezing., Disp: 1 Inhaler, Rfl: 6     atorvastatin (LIPITOR) 20 MG tablet, Take 1 tablet by mouth every evening for cholesterol, Disp: 90 tablet, Rfl: 3     benztropine (COGENTIN) 1 MG tablet, Take 1 tablet (1 mg total) by mouth daily., Disp: 30 tablet, Rfl: 11     carBAMazepine (TEGRETOL) 100 mg chewable tablet, Chew 300 mg 2 (two)  times a day. In AFTERNOON and BEDTIME, Disp: , Rfl:      carBAMazepine (TEGRETOL) 200 mg tablet, Take 2 tablets by mouth every morning with breakfast (generic. Tegretol), Disp: 60 tablet, Rfl: 0     diphenhydrAMINE (BENADRYL) 50 MG tablet, Take 1 tablet (50 mg total) by mouth 2 (two) times a day as needed for sleep (anxiety or agitation)., Disp: 60 tablet, Rfl: 11     DULoxetine (CYMBALTA) 60 MG capsule, Take 2 capsules (120 mg total) by mouth daily., Disp: 60 capsule, Rfl: 11     enoxaparin (LOVENOX) 300 mg/3 mL Soln, Inject 1.4 mL (140 mg total) under the skin every 12 (twelve) hours., Disp: 6 mL, Rfl: 0     EPINEPHrine 0.15 mg/0.15 mL atIn, Inject 1 each into the shoulder, thigh, or buttocks., Disp: , Rfl:      ethotoin (PEGANONE) 250 mg Tab, Take 1,000 mg by mouth 3 (three) times a day., Disp: , Rfl:      levothyroxine (SYNTHROID, LEVOTHROID) 175 MCG tablet, Take 1 tablet by mouth daily, Disp: 30 tablet, Rfl: 2     losartan (COZAAR) 50 MG tablet, Take 1 tablet by mouth daily, Disp: 90 tablet, Rfl: 2     omeprazole (PRILOSEC) 20 MG capsule, Take 1 capsule (20 mg total) by mouth Daily before breakfast., Disp: 30 capsule, Rfl: 11     polyethylene glycol (MIRALAX) 17 gram packet, Take 17 g by mouth daily as needed. , Disp: , Rfl:      QUEtiapine (SEROQUEL) 300 MG tablet, Take 1 tablet (300 mg total) by mouth at bedtime., Disp: 30 tablet, Rfl: 11     QUEtiapine (SEROQUEL) 50 MG tablet, Take 1 tablet (50 mg total) by mouth every morning., Disp: 30 tablet, Rfl: 11     risperiDONE (RISPERDAL) 2 MG tablet, Take 1 tablet (2 mg total) by mouth at bedtime., Disp: 30 tablet, Rfl: 2     senna (SENOKOT) 8.6 mg tablet, Take 2 tablets by mouth 2 (two) times a day., Disp: , Rfl:      traZODone (DESYREL) 100 MG tablet, Take 2 tablets (200 mg total) by mouth at bedtime., Disp: 60 tablet, Rfl: 11     VITAMIN D3 2,000 unit capsule, Take 1 capsule by mouth daily (vitamin d3), Disp: 90 capsule, Rfl: 1     warfarin (COUMADIN) 5 MG  "tablet, Take 10 mg 2 days a week and 7.5 mg the other 5 days, Disp: 90 tablet, Rfl: 11      Review of Systems  See HPI     Objective:     Vitals:    02/16/18 1354 02/16/18 1429   BP: 122/82 120/80   Patient Site: Right Arm Left Arm   Patient Position: Sitting Sitting   Cuff Size: Adult Large Adult Large   Pulse: 88 90   Resp: 18 18   Temp: 98.3  F (36.8  C)    TempSrc: Oral    SpO2: 97% 97%   Weight: (!) 296 lb (134.3 kg)    Height: 5' 6\" (1.676 m)         Physical Exam   Constitutional: She appears well-nourished. No distress.   Cardiovascular: Normal rate and regular rhythm.    Pulmonary/Chest: Effort normal and breath sounds normal. No respiratory distress. She has no wheezes.   Genitourinary: Rectum normal. Rectal exam shows guaiac negative stool.   Neurological: She is alert. No cranial nerve deficit.     Results for orders placed or performed in visit on 02/16/18   Hemoglobin   Result Value Ref Range    Hemoglobin 14.2 12.0 - 16.0 g/dL   Occult Blood(ICT)   Result Value Ref Range    Fecal Occult Bld (ICT) 1 Negative Negative      "
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
## 2021-06-16 NOTE — TELEPHONE ENCOUNTER
ANTICOAGULATION  MANAGEMENT    Assessment     Today's INR result of 2.7 is Therapeutic (goal INR of 2.0-3.0)        Warfarin taken as previously instructed    No new diet changes affecting INR    No new medication/supplements affecting INR    Continues to tolerate warfarin with no reported s/s of bleeding or thromboembolism     Previous INR was Subtherapeutic    Plan:     Spoke on phone with Valentina regarding INR result and instructed:      Warfarin Dosing Instructions:  Continue current warfarin dose 10 mg daily  (0 % change)    Instructed patient to follow up no later than: 2 weeks    Education provided: target INR goal and significance of current INR result, importance of notifying clinic for changes in medications and importance of notifying clinic for diarrhea, nausea/vomiting, reduced intake and/or illness    Valentina verbalizes understanding and agrees to warfarin dosing plan.    Instructed to call the [facility] for any changes, questions or concerns. (#[phone])   ?   Promise Michele RN    Subjective/Objective:      Valentina Olmedo, a 61 y.o. female is on warfarin. Valentina Montgomery reports:     Home warfarin dose: verbally confirmed home dose with Valentina and updated on anticoagulation calendar     Missed doses: No     Medication changes:  No     S/S of bleeding or thromboembolism:  No     New Injury or illness:  No     Changes in diet or alcohol consumption:  No     Upcoming surgery, procedure or cardioversion:  No    Anticoagulation Episode Summary     Current INR goal:  2.0-3.0   TTR:  30.7 % (1 y)   Next INR check:  4/15/2021   INR from last check:  2.70 (4/1/2021)   Weekly max warfarin dose:     Target end date:     INR check location:     Preferred lab:     Send INR reminders to:  [facility]    Indications    History of pulmonary embolism [Z86.711]           Comments:           Anticoagulation Care Providers     Provider Role Specialty Phone number    Donald Rooney MD Referring Internal Medicine  [phone]    Promise Vanegas MD [facility] [phone]

## 2021-06-16 NOTE — PROGRESS NOTES
"Mental Health Visit Note    3/1/2018    Start time: 1:00pm    Stop Time: 1:55pm Session #3     Valentina Olmedo is a 58 y.o. female is being seen today for    Chief Complaint   Patient presents with      Follow Up     Depression     Anxiety   .     New symptoms or complaints: None.    Functional Impairment:   Personal: 3  Family: 2  Work: 4  Social: 4    Clinical assessment of mental status: Patient's grooming is Well groomed, his attire is Appropriate, and his age appears Appears Stated. Behavior towards examiner is Cooperative, motor activity is Retarded, and eye contact is Staring.  Patient's mood is Depressed, and affect is Blunted and Flat.  Speech/language is Delayed/Hesitant, attention is Distractible, and concentration is Brief. Thought process is Slow, thought content is Within noraml and  Within normal.  Patient's orientation is X 3, memory is  Impairment and Recent, judgement is Impairment and Minimal, and estimated intelligence is Below Average. Demonstrated insight is Adequate while fund of knowledge is adequate.    Suicidal/Homicidal Ideation present: None Reported This Session    Patient's impression of their current status:    Patient arrives with spouse for support and collateral. Reports mood as \"OK\", but continues with anxiety and depression. Does not identify triggers. States she hates their apartment but unable to identify issues except that she has to walk away from the building in order to smoke.  \"But it's making me smoke less.\"   Says her thinking is \"foggy\".     Minimal progress on therapy goals:  She says she is not getting dressed even half the time \"if I don't have anything to do\", but spends time with spouse in living room, goes grocery shopping.  Made a plan to go to food shelf. She worked with spouse on a budget for needed savings for car, but they have only been able to put away $200 since moving in months ago. She will see psychiatrist on 3/6.  Will follow up with neuro-psych " "testing on 3/12.      Therapist impression of patients current state:   Baseline depressive and anxiety sx continue but makes some use of supportive psychotherapy. Slowed, slightly confused thinking continues.  Sx do not appear to be getting in the way of stable community living but writer suspect dependence on spouse, who appears to have multiple challenges of his own.  Continues to be able to maintain minimal community functioning outside of ED and inpatient MH. Writer does expect some level of crisis when leased car is returned if they are without funds to obtain another vehicle.    Processed negative cognitions, reinforced strengths, validated patient efforts and feelings. Per psychiatrist request, she has neuropsych testing with [facility] on March 12 and was assisted with that paperwork (unable to complete on her own). Writer discussed with patient her minimal progress on mood issues, offered to reduce therapy sessions to every four weeks, patient says \"No, I need to come.\"    Type of psychotherapeutic technique provided: Client centered, Solution-focused and CBT    Progress toward short term goals: Minimal progress: She says she is not getting dressed even half the time \"if I don't have anything to do\", but spends time with spouse in living room, goes grocery shopping.  Made a plan to go to food [employer]. She worked with spouse on a budget for needed savings for car, but they have only been able to put away $200 since moving in months ago. She will see psychiatrist on 3/6.  Will follow up with neuro-psych testing on 3/12.      Review of long term goals: Not done at today's visit and Date of last review 12/21/2017    Diagnosis:   1. Depression, major, recurrent, moderate    2. Generalized anxiety disorder    3. Borderline personality disorder    4. Cognitive deficits        Plan and Follow up: Patient will return for follow up in two weeks. She agrees again to work with spouse on needed savings for a car, " looking at cable, cell phones, junk food. She agrees to try to get up daily, get dressed, be with spouse in living room and to activate by going out to stores, etc. She will check with psychiatrist on meds on 3/6.  Will follow up with neuro-psych testing on 3/12.      Discharge Criteria/Planning: Client has chronic symptoms and ongoing therapy for maintenance stability recommended.    Loretta Barney MSW, LICSW  3/1/2018

## 2021-06-16 NOTE — PROGRESS NOTES
[facility] Certification Request    March 2, 2018      Patient: Valentina Olmedo  MR Number: [id number]  YOB: 1960  Date of Visit: 3/2/2018    Thank you for this referral.   We are seeing Valentina Olmedo for Physical Therapy of weakness and balance.    Medicare and/or Medicaid requires physician review and approval of the treatment plan. Please review the plan of care and verify that you agree with the therapy plan of care by co-signing this note.      Plan of Care  Authorization / Certification Start Date: 03/02/18  Authorization / Certification End Date: 06/02/18  Authorization / Certification Number of Visits: 10-12  Communication with: Referral Source  Patient Related Instruction: Nature of Condition;Treatment plan and rationale;Self Care instruction;Basis of treatment;Body mechanics;Posture;Precautions;Next steps;Expected outcome  Times per Week: 1-2  Number of Weeks: 12  Number of Visits: 12  Discharge Planning: Pt will be discharged when all goals are met, lack of progress or worsening condition, MD referral, and/or pt desires to continue with HEP independently.  Precautions / Restrictions : pulmonary embolism  Therapeutic Exercise: ROM;Stretching;Strengthening  Neuromuscular Reeducation: kinesio tape;posture;balance/proprioception;TNE;core  Manual Therapy: soft tissue mobilization;myofascial release;joint mobilization;muscle energy;strain counterstrain  Modalities: electrical stimulation;TENS;ultrasound;iontophoresis;cold pack;hot pack  Functional Training (ADL's): self care;ergonomics;ADL's;instructions in transfers;instructions for equipment  Equipment: theraband;TENS unit    Goals:  Pt. will be independent with home exercise program in : 4 weeks  Pt will: be able to increase APTA sit-to-stand by 2+ to demonstrate improved function within 6 weeks.  Pt will: decrease TUG by 2 seconds or more to demonstrate improved mobility within 5 weeks.  Pt will: improve 2 minute walk test of 5+  meters in 2 minutes withni 6 weeks.  Pt will: require no assistance of arms with sit-to-stand within 5 weeks.      If you have any questions or concerns, please don't hesitate to call.    Sincerely,      Evans Calderon, PT        Physician recommendation:     ___ Follow therapist's recommendation        ___ Modify therapy      *Physician co-signature indicates they certify the need for these services furnished within this plan and while under their care.        [facility]   Balance Initial Evaluation    Patient Name: Valentina Olmedo  Date of evaluation: 3/2/2018  Referral Diagnosis: Frequent falls  Referring provider: Donald Rooney MD  Visit Diagnosis:     ICD-10-CM    1. Gait instability R26.81    2. Generalized muscle weakness M62.81    3. Decreased strength, endurance, and mobility R53.1     Z74.09        Assessment:     Valentina Olmedo is a 58 y.o. female who presents to therapy today with chief complaints of balance problems and weakness that has been worsening over the past year. Pt having limited endurance and poor exercise tolerance as well. Pt having history of pulmonary embolus that may also be contributing to poor activity tolerance. Pt having multiple falls in the past year and most difficulty with dynamic changes in balance including head turns. Pt had near fall during TUG assessment today. Pt would benefit from further PT to improve her strength, balance, and endurance to help with her independence at home. Pt reported h/o COPD, hypothyroidism, hyperlipidemia, hypertension, kidney disease, anxiety/depression, and pulmonary embolism.  Functional impairments include standing, sitting, sleeping, transfers, walking, ambulating stairs, and bathing.     Impairments in  pain, posture, ROM, joint mobility, strength, ADL's, gait/locomotion and balance  The POC is dynamic and will be modified on an ongoing basis.  Patient will return to clinic if symptoms persist.  Barriers to achieving goals as  noted in the assessment section may affect outcome.  Prognosis to achieve goals is  good   Skilled PT is required to improve mobility, endurance, strength, posture, ADL function, balance, and reduce pain.  Pt. is appropriate for skilled PT intervention as outlined in the Plan of Care (POC).  Pt. is a good candidate for skilled PT services to improve pain levels and function.    Goals:  Pt. will be independent with home exercise program in : 4 weeks  Pt will: be able to increase APTA sit-to-stand by 2+ to demonstrate improved function within 6 weeks.  Pt will: decrease TUG by 2 seconds or more to demonstrate improved mobility within 5 weeks.  Pt will: improve 2 minute walk test of 5+ meters in 2 minutes withni 6 weeks.  Pt will: require no assistance of arms with sit-to-stand within 5 weeks.    Patient's expectations/goals are realistic.    Barriers to Learning or Achieving Goals:  No Barriers.       Plan / Patient Instructions:        Plan of Care:   Authorization / Certification Start Date: 03/02/18  Authorization / Certification End Date: 06/02/18  Authorization / Certification Number of Visits: 10-12  Communication with: Referral Source  Patient Related Instruction: Nature of Condition;Treatment plan and rationale;Self Care instruction;Basis of treatment;Body mechanics;Posture;Precautions;Next steps;Expected outcome  Times per Week: 1-2  Number of Weeks: 12  Number of Visits: 12  Discharge Planning: Pt will be discharged when all goals are met, lack of progress or worsening condition, MD referral, and/or pt desires to continue with HEP independently.  Precautions / Restrictions : pulmonary embolism  Therapeutic Exercise: ROM;Stretching;Strengthening  Neuromuscular Reeducation: kinesio tape;posture;balance/proprioception;TNE;core  Manual Therapy: soft tissue mobilization;myofascial release;joint mobilization;muscle energy;strain counterstrain  Modalities: electrical stimulation;TENS;ultrasound;iontophoresis;cold  pack;hot pack  Functional Training (ADL's): self care;ergonomics;ADL's;instructions in transfers;instructions for equipment  Equipment: theraband;TENS unit     POC was discussed with patient and patient is in agreement with this plan    Plan for next visit: review sit-to-stands, NuStep for endurance, mat exercises for LE strength (bridge, clamshells, SLRs), static balance      Subjective:         Social information:   Living Situation:apartment and has assistance Yes   Occupation:unemployed   Work Status:NA   Equipment Available: None    History of Present Illness:    Valentina is a 58 y.o. female who presents to therapy today with complaints of several falls that have been occurring more and more over the past year. She does not recall a specific injury or incident when her balance started to get worse. She feels that she has also been feeling more weak in general. She notices that her balance is worse when she is walking. She does recall hitting her head about 2 months ago when she fell. She shares that she has had history of pulmonary embolisms which she thinks is contributing. She does not have steps in her new apartment. She also complains of back pain occasionally. Symptoms are getting worse. She reports  an episodic  history of similar symptoms. She describes their previous level of function as not limited.     Pain Ratin  Pain rating at best: 0  Pain rating at worst: 10  Pain description: pain and sharp    Functional limitations are described as occurring with:   ascending stairs or curbs  personal cares washing hair and washing body  performing routine daily activities  sitting : prolonged  sleeping  standing : 5 minute tolerance  transitional movements getting in  bed and chair, getting out of  bed and chair, sit to stand and sit to supine  walking : prolonged       Objective:      Note: Items left blank indicates the item was not performed or not indicated at the time of the evaluation.    Patient Outcome  Measures :    Lower Extremity Functional Scale (_/80): 12   Scores range from 0-80, where a score of 80 represents maximum function. The minimal clinically important difference is a positive change of 9 points.    Balance Examination  1. Gait instability     2. Generalized muscle weakness     3. Decreased strength, endurance, and mobility       Involved side: Bilateral  Posture Observation:      General sitting posture is  poor.  General standing posture is poor.  Assistive Device:  None     Gait Observation:  Slow pace    LE Strength: general weakness B    LE ROM: Limited hip ROM B    Balance Assessment:    Rhomberg - Eyes Open:  30+ seconds  Rhomberg - Eyes Closed:  30+ seconds  APTA sit < > stand:  9 in 30 seconds  TUG:  TUG Score: 19.2 seconds  1) 20.09, 2) 19.18, 3) 18.44 sec  2 minute walk test: 63.6 in 1:28.5 minutes      Treatment Today     TREATMENT MINUTES COMMENTS   Evaluation 30    Self-care/ Home management     Manual therapy     Neuromuscular Re-education     Therapeutic Activity     Therapeutic Exercises 10 See exercises for HEP. Educated on the importance of HEP and general strengthening.    Gait training     Modality__________________                Total 40    Blank areas are intentional and mean the treatment did not include these items.     PT Evaluation Code: (Please list factors)  Patient History/Comorbidities: COPD, hypothyroidism, hyperlipidemia, hypertension, kidney disease, anxiety/depression, pulmonary embolism  Examination: gait instability, weakness, decreased endurance  Clinical Presentation: stable  Clinical Decision Making: low    Patient History/  Comorbidities Examination  (body structures and functions, activity limitations, and/or participation restrictions) Clinical Presentation Clinical Decision Making (Complexity)   No documented Comorbidities or personal factors 1-2 Elements Stable and/or uncomplicated Low   1-2 documented comorbidities or personal factor 3 Elements Evolving  clinical presentation with changing characteristics Moderate   3-4 documented comorbidities or personal factors 4 or more Unstable and unpredictable High          Evans Calderon, PT, DPT  3/2/2018  2:40 PM

## 2021-06-16 NOTE — TELEPHONE ENCOUNTER
Telephone Encounter by Gustavo Gamez RN at 3/20/2020  2:41 PM     Author: Gustavo Gamez RN Service: -- Author Type: Registered Nurse    Filed: 3/20/2020  4:25 PM Encounter Date: 3/20/2020 Status: Attested    : Gustavo Gamez RN (Registered Nurse) Cosigner: Donald Rooney MD at 3/23/2020 10:07 AM    Attestation signed by Donald Rooney MD at 3/23/2020 10:07 AM    x                ANTICOAGULATION  MANAGEMENT    Assessment     Today's INR result of 2.5 is Therapeutic (goal INR of 2.0-3.0)        Warfarin recently held as instructed which may be affecting INR    No new diet changes affecting INR    No new medication/supplements affecting INR should be finishing bactrim today    Continues to tolerate warfarin with no reported s/s of bleeding or thromboembolism     Previous INR was Subtherapeutic    Plan:     Left a detailed message for Valentina regarding INR result and instructed:     Warfarin Dosing Instructions:  Change warfarin dose to 7.5 mg daily on fri; and 2.5 mg daily rest of week  (16.7 % change)    Instructed patient to follow up no later than: one week    Instructed to call the [facility] for any changes, questions or concerns. (#[phone])   ?   Gustavo Gamez RN    Subjective/Objective:      Valentina Olmedo, a 60 y.o. female is on warfarin.     Valentina reports:      Missed doses: No     Medication changes:  No     S/S of bleeding or thromboembolism:  No     New Injury or illness:  No     Changes in diet or alcohol consumption:  No     Upcoming surgery, procedure or cardioversion:  No    Anticoagulation Episode Summary     Current INR goal:   2.0-3.0   TTR:   46.1 % (1 y)   Next INR check:   3/27/2020   INR from last check:      Weekly max warfarin dose:      Target end date:      INR check location:      Preferred lab:      Send INR reminders to:   [facility]    Indications    Pulmonary embolism with infarction (H) [I26.99]           Comments:            Anticoagulation  Care Providers     Provider Role Specialty Phone number    Donald Rooney MD Referring Internal Medicine [phone]

## 2021-06-16 NOTE — PROGRESS NOTES
________________________________________  Medications Phoned  to Pharmacy [] yes [x]no  Name of Pharmacist:  List Medications, including dose, quantity and instructions    Medications ordered this visit were e-scribed.  Verified by order class [x] yes  [] no  Cymbalta 60 mg; Melatonin 3 mg; Seroquel 200 mg   Medication changes or discontinuations were communicated to patient's pharmacy: [] yes  [x] no    Dictation completed at time of chart check: [x] yes  [] no    I have checked the documentation for today s encounters and the above information has been reviewed and completed.

## 2021-06-16 NOTE — PROGRESS NOTES
Problem List Items Addressed This Visit     None      Visit Diagnoses     Exposure to COVID-19 virus    -  Primary    Relevant Orders    Symptomatic COVID-19 Virus (CORONAVIRUS) PCR        Subjective: Valentina Olmedo is a 61 y.o. female who is being evaluated via a billable telephone visit.  The patient reports that she had an exposure to COVID. She was about 3 feet away from a friend that tested positive about 3 days ago. She was around the friend two days ago. She has had a mild cold for about 5 days. She has had both of her COVID vaccines with her second shot being on 4/5/2021. No shortness of breath.    Objective: patient in no apparent distress. No cough, shortness of breath, or wheezing during phone call.     What phone number would you like to be contacted at? [phone]    Phone call duration: 6 minutes

## 2021-06-16 NOTE — PROGRESS NOTES
[facility] Daily Progress     Patient Name: Valentina Olmedo  Date: 3/16/2018  Visit #: 3  PTA visit #:    Referral Diagnosis: balance  Referring provider: Doanld Rooney MD  Visit Diagnosis:     ICD-10-CM    1. Gait instability R26.81    2. Generalized muscle weakness M62.81    3. Decreased strength, endurance, and mobility R53.1     Z74.09          Assessment:     Patient is benefitting from skilled physical therapy and is making steady progress toward functional goals.  Patient is appropriate to continue with skilled physical therapy intervention, as indicated by initial plan of care.     Pt was able to tolerate more activity today, but generally continues to fatigue quickly. She was having more low back soreness today which did improve with mobility exercises. She showed more stability with balance activities. Cognitive deficits may be limiting compliance with HEP, but pt is motivated when at sessions. Reviewed no show policy with patient since she has missed her last 2 sessions.     Goal Status:  Pt. will be independent with home exercise program in : 4 weeks  Pt will: be able to increase APTA sit-to-stand by 2+ to demonstrate improved function within 6 weeks.  Pt will: decrease TUG by 2 seconds or more to demonstrate improved mobility within 5 weeks.  Pt will: improve 2 minute walk test of 5+ meters in 2 minutes withni 6 weeks.  Pt will: require no assistance of arms with sit-to-stand within 5 weeks.    Plan / Patient Education:     Continue with initial plan of care.  Progress with home program as tolerated.     Next session: continue balance (static with head turns, foam) progress to dynamic stepping, progress mat exercises for general strengthening, continue standing exercises (step-ups)    Subjective:     Pain Rating: not reported today  Pt says that she is more sore today in her back and hands. She has been trying to be more consistent with her exercises at home. Pt says that she did try  to do some exercises over the weekend, but was very difficult.       Objective:     Balance  - lateral walking x4 for 10'  - NBOS: x3 with 30 second holds, with R to L head turns  - toe taps: 4'' x15 B  - normal stance on black foam: x3 with head turns for 30 seconds    More energy today during session    Decreased low back soreness with light mobility exercises    Improving tolerance with balance exercises    Exercises:  Exercise #1: sit-to-stands: x7  Comment #1: NuStep: 5 minutes, L1.0  Exercise #2: LTRs: on hold  Comment #2: SLRs: x8 B, HEP  Exercise #3: adductor ball squeeze: x10, 5 sec holds  Comment #3: seated knee extensions: x10 B  Exercise #4: TA sets: x10 5 sec holds  Comment #4: bent knee fallouts: OTB x10 B  Exercise #5: seated rotations: light x15  Comment #5: standing hip abduction: x15 B  Exercise #6: standing marching : x10 B  Comment #6: mini-squats: x15        Treatment Today     TREATMENT MINUTES COMMENTS   Evaluation     Self-care/ Home management     Manual therapy     Neuromuscular Re-education 15 See balance in objective. Included Adams assessment.   Therapeutic Activity     Therapeutic Exercises 39 See exercises. Added SLRs and bent-knee fallouts to HEP.   Gait training     Modality__________________                Total 54    Blank areas are intentional and mean the treatment did not include these items.       Evans Calderon, PT, DPT  3/16/2018

## 2021-06-16 NOTE — PROGRESS NOTES
"Mental Health Psychotherapy Telephone Note    Patient: Valentina Olmedo    : 1960 MRN: [id number]    Completed a 2 point identification verification: patient verbalized full name and date of birth.     Date: 2021   Start time: 11:07am   Stop Time: 12:37pm (**30 minutes due to patient time constraints] Session # 3    The patient has been notified of the following:   \"We have found that certain health care needs can be provided without the need for a face to face visit.  This service lets us provide the care you need with a phone conversation.  I will have full access to your [facility] medical record during this entire phone call.   I will be taking notes for your medical record. Since this is like an office visit, we will bill your insurance company for this service.  There are potential benefits and risks of telephone visits (e.g. limits to patient confidentiality) that differ from in-person visits.?  Confidentiality still applies for telephone services, and nobody will record the visit.  It is important to be in a quiet, private space that is free of distractions (including cell phone or other devices) during the visit.?? If during the course of the call I believe a telephone visit is not appropriate, you will not be charged for this service\"  Consent has been obtained for this service by care team member: Yes, per verbal agreement   Session Type: Patient is participating in a telemedicine phone visit.  She does not utilize smart phone for video visit, does not have wifi for video visit.    Those present for this session: patient, therapist    Chief Complaint   Patient presents with      Follow Up     Telephone visit     Anxiety     Anxiety with current dx of COVID-19     Depression     Mild low mood with COVID-19 illness     Memory Loss     Mild cognitive disorder       New symptoms or complaints: Anxiety in the context of Covid-19 illness and treatment    Functional Impairment:   Personal: 3  Family: " "2  Work: 4  Social:4          ASSESSMENT: Current Emotional / Mental Status (status of significant symptoms):              Risk status (Self / Other harm or suicidal ideation)              Patient reports efforts to stay safe during pandemic with social distancing, face mask.              Patient denies current or recent suicidal ideation or behaviors.              Patient denies current or recent homicidal ideation or behaviors.              Patient denies current or recent self injurious behavior or ideation.              Patient denies other safety concerns.              Patient denies change in risk factors.              Patient denies change in protective factors. States her spouse and \"not wanting to be committed\" are protective factors.              Recommended that patient call 911 or go to the local ED should there be a change in any of these risk factors.                Attitude:                                   Cooperative  Sunil              Orientation:                             Oriented x3              Speech                          Rate / Production:       Normal/ Responsive                          Volume:                       Normal               Mood:                                      Anxious  Euthymic              Thought Content:                    Clear               Thought Form:                        Coherent  Logical               Insight:                                     Fair  and External locus      Patient's impression of their current status:   \"I'm worried about how the COVID treatment is going to affect me.\"  Patient complains of anxiety in the context of having contracted Covid-19 despite vaccination. She states she needs to leave session early because she has appt to receive infusion to prevent worsened illness.    Therapist impression of patients current state: This 60-year-old  female with long history of mental health difficulties including anxiety, depression, " PTSD, borderline personality disorder, and remote history of hearing voices returns for psychotherapy telephone visit.   Patient appears reasonably calm despite complaints of anxiety about Covid-19 illness and fears about medical treatment. No acute distress noted today. She is open and cooperative in session.  Processed negative cognitions, reinforced strengths, validated patient efforts and feelings.  Discussed how being proactive with getting vax has likely helped her experience of the illness today given multiple risk factors.  Disussed mindfulness stance of acceptance of care from doctors.    Type of psychotherapeutic technique provided: Client centered, Solution-focused and Mindfulness.    Progress toward short term goals: Poor progress,  not able to follow up on homework due to illness.  Did reach out to  and was proactive on getting COVID-19 test.   Review of long term goals:  Not done at today's visit     Diagnosis:  1. Schizoaffective disorder, depressive type (H)    2. Generalized anxiety disorder    3. Borderline personality disorder (H)      **slightly worsened complaints of anxiety around medical issues    Plan and Follow up: Patient will return for follow-up care in 4 weeks. She will utilize self-soothing and grounding skills as discussed in session for emotional management around stressors.  Goals continued from last session for purposes of maintaining independence: She will walk outside her building 1x/day for emotional health support. She will discuss options with spouse around assisted living.  Will reach out to  around housing support if she decides to apply.    Discharge Criteria/Planning: Client has chronic symptoms and ongoing therapy for maintenance stability recommended.        I have reviewed the note as documented above.  This accurately captures the substance of my conversation with the patient.  As the provider I attest to compliance with applicable laws and  regulations related to telemedicine.  Loretta Barney, LICSW  ** 4/21/21

## 2021-06-16 NOTE — PATIENT INSTRUCTIONS - HE
1. Continue current medications.  2. Continue with physical therapy.  3. You should get a call to schedule with orthopedist.  4. Voltaren gel over the counter as needed.

## 2021-06-16 NOTE — TELEPHONE ENCOUNTER
Telephone Encounter by Shonna Ernandez RN at 3/16/2020  3:26 PM     Author: Shonna Ernandez RN Service: -- Author Type: Registered Nurse    Filed: 3/16/2020  3:47 PM Encounter Date: 3/16/2020 Status: Attested    : Shonna Ernandez RN (Registered Nurse) Cosigner: Donald Rooney MD at 3/16/2020  4:44 PM    Attestation signed by Donald Rooney MD at 3/16/2020  4:44 PM    x                ANTICOAGULATION  MANAGEMENT    Assessment     Today's INR result of 1.30 is Subtherapeutic (goal INR of 2.0-3.0)        Warfarin recently held as instructed which may be affecting INR    No new diet changes affecting INR    No new medication/supplements affecting INR    Continues to tolerate warfarin with YES reported s/s of bleeding from vagina.    Previous INR was Supratherapeutic at 5.30 on 3/13/20.    Plan:     Spoke with Valentina regarding INR result and instructed:      Warfarin Dosing Instructions:  (has 5mg tabs)   - Change warfarin dose to 5 mg daily on Tues/Thurs/Sat; and 7.5 mg daily rest of week   - (10 % change).    Instructed patient to follow up no later than:  INR scheduled this Fri. 3/20/20.    Education provided: target INR goal and significance of current INR result, monitoring for bleeding signs and symptoms and when to seek medical attention/emergency care    Valentina verbalizes understanding and agrees to warfarin dosing plan.    Instructed to call the [facility] for any changes, questions or concerns. (#[phone])   ?   Shonna Ernandez RN    Subjective/Objective:      Valentina Olmedo, a 60 y.o. female is on warfarin.     Valentina reports:     Home warfarin dose: as updated on anticoagulation calendar per template     Missed doses: Yes:  Reported holding warfarin for 3 days, from 3/13-15.     Medication changes:  No     S/S of bleeding or thromboembolism:  Yes: reported still some bleeding from the vagina.  Dr. Rooney did evaluate patient on 3/13/20, and made a referral to GYN.     New  Injury or illness:  Yes: reported a FALL on her right side just 10 minutes ago,  hard to walk and right arm hurting.  She is able to move all her fingers with no problems.  Did not hit her heaf.   Changes in diet or alcohol consumption:  No     Upcoming surgery, procedure or cardioversion:  No    Anticoagulation Episode Summary     Current INR goal:   2.0-3.0   TTR:   45.7 % (1 y)   Next INR check:   3/20/2020   INR from last check:   1.30! (3/16/2020)   Weekly max warfarin dose:      Target end date:      INR check location:      Preferred lab:      Send INR reminders to:   [facility]    Indications    Pulmonary embolism with infarction (H) [I26.99]           Comments:            Anticoagulation Care Providers     Provider Role Specialty Phone number    Donald Rooney MD Referring Internal Medicine [phone]

## 2021-06-16 NOTE — TELEPHONE ENCOUNTER
Telephone Encounter by Eleanor Coombs RN at 5/28/2019  9:39 AM     Author: Eleanor Coombs RN Service: Behavioral Author Type: Registered Nurse    Filed: 5/28/2019  9:46 AM Encounter Date: 5/28/2019 Status: Signed    : Eleanor Coombs RN (Registered Nurse)       Pt has refills available from last prescription, however she would like to  90 day supplies.     Date of Last Office Visit: 5/7/19  Date of Next Office Visit: 7/9/19  No shows since last visit: 0  Cancellations since last visit: 0  ED visits since last visit:  0    Medication quetiapine 200 mg date last ordered: 2/26/19  Qty: 30  Refills: 6  Medication trazodone 100 mg date last ordered: 2/26/19  Qty: 60  Refills: 6    Lapse in therapy greater than 7 days: No. Patient is requesting to  90 day supply.  Medication refill request verified as identical to current order: Yes  Result of Last DAM, VPA, Li+ Level, CBC, or Carbamazepine Level (at or since last visit): N/A     [] Medication refilled per [facility] M-1.   [x] Medication unable to be refilled by RN due to criteria not met as indicated below:     []Eligibility - not seen in last year    []Supervision - no future appointment    []Compliance     []Verification - order discrepancy    []Controlled Medication    []Medication not included in RN Protocol    [x]90 - day supply request    []Other     Current Medication list:    Medication Plan of Care at last office visit with MD/CNP:

## 2021-06-16 NOTE — TELEPHONE ENCOUNTER
Telephone Encounter by Eleanor Coobms RN at 11/26/2019  8:44 AM     Author: Eleanor Coombs RN Service: Behavioral Author Type: Registered Nurse    Filed: 11/26/2019  8:55 AM Encounter Date: 11/26/2019 Status: Signed    : Eleanor Coombs RN (Registered Nurse)       Date of Last Office Visit: 8/20/19  Date of Next Office Visit: 12/10/19  No shows since last visit: 10/22/19  Cancellations since last visit: 0  ED visits since last visit: 9/9/19 (shortness of breath), 11/19/19 (altered mental state), 11/24/19 (abdominal pain)     Medication trazodone 100 mg date last ordered: 9/24/19  Qty: 180  Refills: 0    Lapse in therapy greater than 7 days: No, early request ([place])  Medication refill request verified as identical to current order: Yes  Result of Last DAM, VPA, Li+ Level, CBC, or Carbamazepine Level (at or since last visit): N/A     [] Medication refilled per [facility] M-1.   [x] Medication unable to be refilled by RN due to criteria not met as indicated below:     []Eligibility - not seen in last year    []Supervision - no future appointment    [x]Compliance - no show's     []Verification - order discrepancy    []Controlled Medication    []Medication not included in RN Protocol    []90 - day supply request    [x]Other, ED visits     Current Medication list:    Medication Plan of Care at last office visit with MD/CNP:    According to plan on 8/20/19, trazodone was to be decreased to 100 mg at bedtime. See office visit note.

## 2021-06-16 NOTE — PROGRESS NOTES
"[facility] Daily Progress     Patient Name: Valentina Olmedo  Date: 3/7/2018  Visit #: 2  PTA visit #:    Referral Diagnosis: balance  Referring provider: Donald Rooney MD  Visit Diagnosis:     ICD-10-CM    1. Gait instability R26.81    2. Generalized muscle weakness M62.81    3. Decreased strength, endurance, and mobility R53.1     Z74.09          Assessment:     Patient is benefitting from skilled physical therapy and is making steady progress toward functional goals.  Patient is appropriate to continue with skilled physical therapy intervention, as indicated by initial plan of care.     Pt limited today at session due to fatigue and needing frequent rest breaks throughout the session. Pt assessed with SYED today and showing risk for falls as expected. Pt scored a 34/56, but moving very slow with all aspects of the assessment. Pt having more difficulty with dynamic balance activities including head turns. Weakness in the LEs is also believed to be contributing. Pt would benefit from continuing PT in order to improve her strength and stability.    Goal Status:  Pt. will be independent with home exercise program in : 4 weeks  Pt will: be able to increase APTA sit-to-stand by 2+ to demonstrate improved function within 6 weeks.  Pt will: decrease TUG by 2 seconds or more to demonstrate improved mobility within 5 weeks.  Pt will: improve 2 minute walk test of 5+ meters in 2 minutes withni 6 weeks.  Pt will: require no assistance of arms with sit-to-stand within 5 weeks.    Plan / Patient Education:     Continue with initial plan of care.  Progress with home program as tolerated.     Next session: continue balance (static with head turns, foam) progress to dynamic stepping, progress mat exercises for general strengthening (standing as able)    Subjective:     Pain Rating: \"not much\"  Pt shares that she has been doing okay. \"I haven't fallen which is good.\" She says that she has not been able to do much " exercise at home.      Objective:     Balance  - Adams assessment  - NBOS: x4 with 30 second holds, with R to L head turns  - NBOS on foam: x2 with 30 second holds, increased sway    Limited with NuStep and sit-to-stands due to fatigue today  Very fatigued at end of session    Supine positioning limited due to LBP    Exercises:  Exercise #1: sit-to-stands: x7  Comment #1: NuStep: 5 minutes, L5.0  Exercise #2: LTRs: x10 B  Comment #2: SLRs: x5 B  Exercise #3: adductor ball squeeze: x10, 5 sec holds  Comment #3: seated knee extensions: x10 B    Adams Score: 34      Treatment Today     TREATMENT MINUTES COMMENTS   Evaluation     Self-care/ Home management     Manual therapy     Neuromuscular Re-education 25 See balance in objective. Included Adams assessment.   Therapeutic Activity     Therapeutic Exercises 30 See exercises. Added SLRs and bent-knee fallouts to HEP.   Gait training     Modality__________________                Total 55    Blank areas are intentional and mean the treatment did not include these items.       Evans Calderon, PT, DPT  3/7/2018

## 2021-06-16 NOTE — TELEPHONE ENCOUNTER
Telephone Encounter by Lorraine Villegas LPN at 10/23/2019 11:15 AM     Author: Lorraine Villegas LPN Service: -- Author Type: Licensed Nurse    Filed: 10/23/2019 11:17 AM Encounter Date: 10/23/2019 Status: Signed    : Lorraine Villegas LPN (Licensed Nurse)       Called and updated [place] that her PMD  Dr. Rooney prescribing senna.

## 2021-06-16 NOTE — PROGRESS NOTES
"DISCUS score- 2 (2/28/18), rec'd from [facility].    Last DISCUS score- 1 (2/28/17)  ---------------------------------------------------------------    Review of records received from [facility], Vikki Benitez MD.    (Notes sent for scanning after review)    Note excerpt from 1/17/18 visit:  \"Valentina last seen in clinic on 8/16/16.  On January 11, 2018 patient had episode at PCP office, was evaluated at [facility] ED.  ED provider thought patient may have had seizure and was post ictal.  Valentina does not believe she had a seizure.    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  Extension of right temporal lobectomy done in 1987 and on 10/3/94.    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    Medications  Peganone 250 mg tabs take 4 tabs (1000mg) tid  Carbamazepine 400 mg a.m., 300 mg p.m. and at bedtime    Plan:   EEG, brain MRI, neuropsychometric testing.    No changes made in anticonvulsant medications.    Follow-up 3/7/18\".      "

## 2021-06-16 NOTE — PROGRESS NOTES
Called and left message with patient regarding 2nd no show today. No show charge placed today. Included discussion of no show policy.

## 2021-06-16 NOTE — TELEPHONE ENCOUNTER
Coronavirus (COVID-19) Notification    Reason for call  Notify of POSITIVE  COVID-19 lab result, assess symptoms,  review [facility] recommendations    Lab Result   Lab test for 2019-nCoV rRt-PCR or SARS-COV-2 PCR  Oropharyngeal AND/OR nasopharyngeal swabs were POSITIVE for 2019-nCoV RNA [OR] SARS-COV-2 RNA (COVID-19) RNA     We have been unable to reach Patient by phone at this time to notify of their Positive COVID-19 result.  Left voicemail message requesting a call back to [phone] [facility] for results.        POSITIVE COVID-19 Letter sent.    Mimi Guillermo LPN

## 2021-06-16 NOTE — PROGRESS NOTES
"Mental Health Visit Note    3/15/2018    Start time: 1:00pm    Stop Time: 1:55pm Session #4     Valentina Olmedo is a 58 y.o. female is being seen today for    Chief Complaint   Patient presents with      Follow Up     Depression     Anxiety   .     New symptoms or complaints: None    Functional Impairment:   Personal: 3  Family: 2  Work: 4  Social: 4    Clinical assessment of mental status: Patient's grooming is Well groomed, his attire is Appropriate, and his age appears Appears Stated. Behavior towards examiner is Cooperative, motor activity is Retarded, and eye contact is Staring.  Patient's mood is Depressed, and affect is Blunted and Flat.  Speech/language is Delayed/Hesitant, attention is Distractible, and concentration is Brief. Thought process is Slow, thought content is Within noraml and  Within normal.  Patient's orientation is X 3, memory is  Impairment and Recent, judgement is Impairment and Minimal, and estimated intelligence is Below Average. Demonstrated insight is Adequate while fund of knowledge is adequate.    Suicidal/Homicidal Ideation present: None Reported This Session    Patient's impression of their current status:      Patient reports mood as \"anxious\" (chronic). Unable to describe triggers for same.  Describes anxiety as \"there all the time\".  She says she is not getting dressed even half the time \"if I don't have anything to do\", but spends time with spouse in living room, goes grocery shopping.  Went to [employer]  Able to work with spouse to obtain a new leased car. Missed both psychiatry 3/6 and neuro-psych testing 3/12 due to no show, agrees to attend upon re-scheduling.  Patient able to identify positives: Was able to get another car leased. \"We don't drink and we don't use drugs.\"    Therapist impression of patients current state: Baseline depressive and anxiety sx continue but makes some use of supportive psychotherapy. Slowed, slightly confused thinking continues.  Sx do not " "appear to be getting in the way of stable community living but writer suspect dependence on spouse, who appears to have multiple challenges of his own.  Continues to be able to maintain minimal community functioning outside of ED and inpatient MH.    Processed negative cognitions, reinforced strengths, validated patient efforts and feelings.  We brainstormed ways to remind herself, with assistance of spouse, using calendar, asking for extra reminder appointments from clinic staff.  She continues to insist she needs therapy appts for support and anxiety management.    Type of psychotherapeutic technique provided: Client centered, Solution-focused and CBT    Progress toward short term goals: Minimal progress: She says she is not getting dressed even half the time \"if I don't have anything to do\", but spends time with spouse in living room, goes grocery shopping.  Went to food [employer].  Able to work with spouse to obtain a new leased car. Missed both psychiatry 3/6 and neuro-psych testing 3/12 due to no show, agrees to attend upon re-scheduling.    Review of long term goals: Not done at today's visit and Date of last review 12/21/2017    Diagnosis:   1. Generalized anxiety disorder    2. Depression, major, recurrent, moderate    3. Borderline personality disorder    4. Cognitive deficits        Plan and Follow up: Patient will return for follow up in two weeks. Will work with staff to re-schedule psychiatry and neuro-psych testing. She agrees again to work with spouse on remembering these appts.  She agrees to try to get up daily, get dressed, be with spouse in living room and to activate by going out to stores, etc.     Discharge Criteria/Planning: Client has chronic symptoms and ongoing therapy for maintenance stability recommended.    Loretta Barney MSW, LICSW  3/15/2018  "

## 2021-06-16 NOTE — PROGRESS NOTES
Office Visit - Follow Up   Valentina Olmedo   58 y.o. female    Date of Visit: 2/23/2018    Chief Complaint   Patient presents with     Hypertension        Assessment and Plan   1. Essential hypertension with goal blood pressure less than 140/90  Blood pressures controlled continue current medications recent labs back in January looked okay    2. Stage 3 chronic kidney disease  The above, losartan for renal protection, labs recently okay    3. Hypothyroidism due to acquired atrophy of thyroid  Continue levothyroxine    4. Anxiety disorder, unspecified type  Stable, per psychiatry    5. Nicotine dependence, cigarettes, with other nicotine-induced disorders  We will can cessation advised    6. Frequent falls  Patient generally high risk for falls.  High risk for recurrent pulmonary embolus.  We will try and minimize her risk by having her do some balance training with physical therapy.  She is on a lot of medications and easily decompensates from a psychiatric standpoint and will avoid altering any of these especially outside the guidance of psychiatry  - Ambulatory referral to Adult PT- Internal    8. Pulmonary embolism with infarction  - INR    7. Morbid obesity with BMI of 45.0-49.9, adult  The following high BMI interventions were performed this visit: encouragement to exercise and lifestyle education regarding diet    Return in about 4 weeks (around 3/23/2018) for recheck.     History of Present Illness   This 58 y.o. old woman comes in for follow-up of numerous medical problems.  Overall she is doing okay.  She still living independent with her .  She reports that she has numerous falls.  These are generally caused by weakness in her mechanical.  She has no palpitations, rare lightheadedness.  She does not feel she has been confused recently.  No headache.  No nausea vomiting or diarrhea.  She is on warfarin for pulmonary embolus and she did have an elevated INR earlier this year but is now normalized.   "She has not hit her head when she falls.  She is no neurological deficit.  She continues to smoke.  More ambivalent about quitting    Review of Systems: A comprehensive review of systems was negative except as noted.     Medications, Allergies and Problem List   Reviewed and updated     Physical Exam   General Appearance:   No acute distress    /62 (Patient Site: Right Arm, Patient Position: Sitting, Cuff Size: Adult Regular)  Pulse 82  Ht 5' 6\" (1.676 m)  Wt (!) 295 lb (133.8 kg)  SpO2 97%  BMI 47.61 kg/m2    HEENT exam is unremarkable  Neck supple no thyromegaly or nodule palpable  Lymphatic no cervical lymphadenopathy  Cardiovascular regular rate and rhythm no murmur gallop or rub  Pulmonary lungs are clear to auscultation bilaterally  Gastrointestinall abdomen is obese soft nontender nondistended no organomegaly  Neurologic exam is non focal  Psychiatric flat affect, slow response       Additional Information   Current Outpatient Prescriptions   Medication Sig Dispense Refill     albuterol (PROVENTIL HFA;VENTOLIN HFA) 90 mcg/actuation inhaler Inhale 2 puffs every 4 (four) hours as needed for wheezing. 1 Inhaler 6     atorvastatin (LIPITOR) 20 MG tablet Take 1 tablet by mouth every evening for cholesterol 90 tablet 3     benztropine (COGENTIN) 1 MG tablet Take 1 tablet (1 mg total) by mouth daily. 30 tablet 11     carBAMazepine (TEGRETOL) 100 mg chewable tablet Chew 300 mg 2 (two) times a day. In AFTERNOON and BEDTIME       carBAMazepine (TEGRETOL) 200 mg tablet Take 2 tablets by mouth every morning with breakfast (generic. Tegretol) 60 tablet 0     diphenhydrAMINE (BENADRYL) 50 MG tablet Take 1 tablet (50 mg total) by mouth 2 (two) times a day as needed for sleep (anxiety or agitation). 60 tablet 11     DULoxetine (CYMBALTA) 60 MG capsule Take 2 capsules (120 mg total) by mouth daily. 60 capsule 11     enoxaparin (LOVENOX) 300 mg/3 mL Soln Inject 1.4 mL (140 mg total) under the skin every 12 (twelve) " hours. 6 mL 0     EPINEPHrine 0.15 mg/0.15 mL atIn Inject 1 each into the shoulder, thigh, or buttocks.       ethotoin (PEGANONE) 250 mg Tab Take 1,000 mg by mouth 3 (three) times a day.       levothyroxine (SYNTHROID, LEVOTHROID) 175 MCG tablet Take 1 tablet by mouth daily 30 tablet 2     losartan (COZAAR) 50 MG tablet Take 1 tablet by mouth daily 90 tablet 2     omeprazole (PRILOSEC) 20 MG capsule Take 1 capsule (20 mg total) by mouth Daily before breakfast. 30 capsule 11     polyethylene glycol (MIRALAX) 17 gram packet Take 17 g by mouth daily as needed.        QUEtiapine (SEROQUEL) 200 MG tablet Take 200 mg by mouth at bedtime.       QUEtiapine XR (SEROQUEL XR) 50 mg Tb24 Take 50 mg by mouth daily.       risperiDONE (RISPERDAL) 2 MG tablet Take 1 tablet (2 mg total) by mouth at bedtime. 30 tablet 2     senna (SENOKOT) 8.6 mg tablet Take 2 tablets by mouth 2 (two) times a day.       traZODone (DESYREL) 100 MG tablet Take 2 tablets (200 mg total) by mouth at bedtime. 60 tablet 11     VITAMIN D3 2,000 unit capsule Take 1 capsule by mouth daily (vitamin d3) 90 capsule 1     warfarin (COUMADIN) 5 MG tablet Take 10 mg 2 days a week and 7.5 mg the other 5 days 90 tablet 11     No current facility-administered medications for this visit.      Allergies   Allergen Reactions     Aspirin (Tartrazine Only) Shortness Of Breath     Can have asa as long as no yellow dye     Gabapentin Rash     Guilherme Johnsons rash     Venom-Honey Bee Anaphylaxis     Vistaril [Hydroxyzine Pamoate] Other (See Comments)     Excessive sedation     Yellow Dye Shortness Of Breath     Linked only to tartazine.     Hydroxyzine Other (See Comments)     Sluggish; unable to wake up     Latex Itching     Other Environmental Allergy      Dust, Mold, Pollen     Phenobarbital Other (See Comments)     unknown     Latex Itching     Social History   Substance Use Topics     Smoking status: Current Every Day Smoker     Packs/day: 0.50     Years: 35.00     Types:  Cigarettes     Start date: 6/10/1974     Smokeless tobacco: Never Used      Comment: smoking 1-2 a day     Alcohol use No       Review and/or order of clinical lab tests:  Review and/or order of radiology tests:  Review and/or order of medicine tests:  Discussion of test results with performing physician:  Decision to obtain old records and/or obtain history from someone other than the patient:  Review and summarization of old records and/or obtaining history from someone other than the patient and.or discussion of case with another health care provider:  Independent visualization of image, tracing or specimen itself:    Time:      Donald Rooney MD

## 2021-06-17 ENCOUNTER — COMMUNICATION - HEALTHEAST (OUTPATIENT)
Dept: BEHAVIORAL HEALTH | Facility: CLINIC | Age: 61
End: 2021-06-17

## 2021-06-17 NOTE — TELEPHONE ENCOUNTER
Telephone Encounter by Fanta Smith RN at 4/15/2021  2:53 PM     Author: Fanta Smith RN Service: -- Author Type: Registered Nurse    Filed: 4/15/2021  2:57 PM Encounter Date: 4/15/2021 Status: Signed    : Fanta Smith RN (Registered Nurse)       ANTICOAGULATION  MANAGEMENT    Assessment     Today's INR result of 1.9 is Subtherapeutic (goal INR of 2.0-3.0)        Warfarin taken as previously instructed    No new diet changes affecting INR    No new medication/supplements affecting INR    Continues to tolerate warfarin with no reported s/s of bleeding or thromboembolism     Previous INR was Therapeutic    Plan:     Spoke on phone with Valentina regarding INR result and instructed:      Warfarin Dosing Instructions:  Continue current warfarin dose 10 mg daily     Instructed patient to follow up no later than: 2 weeks    Education provided: target INR goal and significance of current INR result    Valentina verbalizes understanding and agrees to warfarin dosing plan.    Instructed to call the [facility] for any changes, questions or concerns. (#[phone])   ?   Fanta Smith RN    Subjective/Objective:      Valentina Olmedo, a 61 y.o. female is on warfarin. Valentina Montgomery reports:     Home warfarin dose: as updated on anticoagulation calendar per template     Missed doses: No     Medication changes:  No     S/S of bleeding or thromboembolism:  No     New Injury or illness:  No     Changes in diet or alcohol consumption:  No     Upcoming surgery, procedure or cardioversion:  No    Anticoagulation Episode Summary     Current INR goal:  2.0-3.0   TTR:  34.0 % (1 y)   Next INR check:  4/29/2021   INR from last check:  1.90 (4/15/2021)   Weekly max warfarin dose:     Target end date:     INR check location:     Preferred lab:     Send INR reminders to:  [facility]    Indications    History of pulmonary embolism [Z86.711]           Comments:           Anticoagulation Care Providers      Provider Role Specialty Phone number    Donald Rooney MD Referring Internal Medicine [phone]    Promise Vanegas MD Responsible Family Medicine [phone]

## 2021-06-17 NOTE — PROGRESS NOTES
Office Visit - Follow Up   Valentina Olmedo   58 y.o. female    Date of Visit: 4/25/2018    Chief Complaint   Patient presents with     Fall        Assessment and Plan   1. Frequent falls  Seems postural in nature, probably related to her numerous medications.  Agree with stopping losartan.  Discussed importance of standing up slowly.  Recommend ongoing balance therapy with physical therapy.  We reviewed her echocardiogram normal.  Reviewed adrenal testing, normal.  Reviewed epilepsy notes, seizures controlled.  Labs have been recently normal per  - Comprehensive Metabolic Panel  - HM2(CBC w/o Differential)  - Ambulatory referral to Adult PT- Internal    2. Hypothyroidism due to acquired atrophy of thyroid  TSH recently normal    3. Essential hypertension with goal blood pressure less than 140/90  Start and has been on hold her blood pressure is okay    4. Stage 3 chronic kidney disease  Kidney function has been stable    5. Adrenal mass, s/p left adrenalectomy 2013, benign adenoma  As above    6. Pulmonary embolism with infarction  Continue warfarin  - INR    7. Anxiety disorder, unspecified type  Per psychiatry    8. Morbid obesity with BMI of 45.0-49.9, adult  The following high BMI interventions were performed this visit: encouragement to exercise and lifestyle education regarding diet    Return in about 4 weeks (around 5/23/2018) for recheck.     History of Present Illness   This 58 y.o. old woman comes in for follow-up.  She continues to have episodic falling.  She reports that she become lightheaded.  This seems to be postural.  She has not hit her head.  She does not lose consciousness.  She has no chest pain.  No vertigo.  No nausea or vomiting.  She recently saw her epilepsy Dr. Edmond looked okay.  She has had an echocardiogram which looked okay.  She has had testing for adrenal insufficiency which was okay.  She stopped losartan for low blood pressure and this may have helped a bit.  She is on numerous  "psychoactive medications.    Review of Systems: A comprehensive review of systems was negative except as noted.     Medications, Allergies and Problem List   Reviewed and updated     Physical Exam   General Appearance:   No acute distress    /62 (Patient Site: Right Arm, Patient Position: Sitting, Cuff Size: Adult Regular)  Pulse 80  Ht 5' 6\" (1.676 m)  Wt (!) 295 lb (133.8 kg)  SpO2 97%  BMI 47.61 kg/m2    HEENT exam is unremarkable  Neck supple no thyromegaly or nodule palpable  Lymphatic no cervical lymphadenopathy  Cardiovascular regular rate and rhythm no murmur gallop or rub  Pulmonary lungs are clear to auscultation bilaterally  Gastrointestinall abdomen soft nontender nondistended no organomegaly  Neurologic exam is non focal  Psychiatric flat affect with long pauses between speech       Additional Information   Current Outpatient Prescriptions   Medication Sig Dispense Refill     albuterol (PROVENTIL HFA;VENTOLIN HFA) 90 mcg/actuation inhaler Inhale 2 puffs every 4 (four) hours as needed for wheezing. 1 Inhaler 6     atorvastatin (LIPITOR) 20 MG tablet Take 1 tablet by mouth every evening for cholesterol 90 tablet 3     benztropine (COGENTIN) 1 MG tablet Take 1 tablet (1 mg total) by mouth daily. 30 tablet 11     carBAMazepine (TEGRETOL) 200 mg tablet Take 200 mg by mouth 3 (three) times a day. 2 tabs (400mg) q AM, 1.5 tabs (300mg) q PM and HS       diphenhydrAMINE (BENADRYL) 50 MG tablet Take 1 tablet (50 mg total) by mouth 2 (two) times a day as needed for sleep (anxiety or agitation). 60 tablet 11     DULoxetine (CYMBALTA) 60 MG capsule Take 2 capsules (120 mg total) by mouth daily. 60 capsule 11     EPINEPHrine 0.15 mg/0.15 mL atIn Inject 1 each into the shoulder, thigh, or buttocks.       ethotoin (PEGANONE) 250 mg Tab Take 1,000 mg by mouth 3 (three) times a day.       levothyroxine (SYNTHROID, LEVOTHROID) 175 MCG tablet Take 1 tablet by mouth daily 30 tablet 10     omeprazole (PRILOSEC) 20 " MG capsule Take 1 capsule by mouth daily before breakfast (generic.Prilosec) 30 capsule 10     polyethylene glycol (MIRALAX) 17 gram packet Take 17 g by mouth daily as needed.        QUEtiapine (SEROQUEL) 200 MG tablet Take 200 mg by mouth at bedtime.       QUEtiapine XR (SEROQUEL XR) 50 mg Tb24 Take 50 mg by mouth daily.       risperiDONE (RISPERDAL) 2 MG tablet Take 1 tablet (2 mg total) by mouth at bedtime. 30 tablet 2     senna (SENOKOT) 8.6 mg tablet Take 2 tablets by mouth 2 (two) times a day.       traZODone (DESYREL) 100 MG tablet Take 2 tablets (200 mg total) by mouth at bedtime. 60 tablet 11     VITAMIN D3 2,000 unit capsule Take 1 capsule by mouth daily (vitamin d3) 30 capsule 5     warfarin (COUMADIN) 5 MG tablet Take 10 mg 2 days a week and 7.5 mg the other 5 days 90 tablet 11     No current facility-administered medications for this visit.      Allergies   Allergen Reactions     Aspirin (Tartrazine Only) Shortness Of Breath     Can have asa as long as no yellow dye     Gabapentin Rash     Guilherme Johnsons rash     Venom-Honey Bee Anaphylaxis     Vistaril [Hydroxyzine Pamoate] Other (See Comments)     Excessive sedation     Yellow Dye Shortness Of Breath     Linked only to tartazine.     Hydroxyzine Other (See Comments)     Sluggish; unable to wake up     Latex Itching     Other Environmental Allergy      Dust, Mold, Pollen     Phenobarbital Other (See Comments)     unknown     Latex Itching     Social History   Substance Use Topics     Smoking status: Current Every Day Smoker     Packs/day: 0.50     Years: 35.00     Types: Cigarettes     Start date: 6/10/1974     Smokeless tobacco: Never Used      Comment: smoking 1-2 a day     Alcohol use No       Review and/or order of clinical lab tests:  Review and/or order of radiology tests:  Review and/or order of medicine tests:  Discussion of test results with performing physician:  Decision to obtain old records and/or obtain history from someone other than  the patient:  Review and summarization of old records and/or obtaining history from someone other than the patient and.or discussion of case with another health care provider:  Independent visualization of image, tracing or specimen itself:    Time:      Donald Rooney MD

## 2021-06-17 NOTE — TELEPHONE ENCOUNTER
Telephone Encounter by Shonna Ernandez RN at 11/30/2020  1:10 PM     Author: Shonna Ernandez RN Service: -- Author Type: Registered Nurse    Filed: 11/30/2020  2:25 PM Encounter Date: 11/30/2020 Status: Signed    : Shonna Ernandez RN (Registered Nurse)       ANTICOAGULATION  MANAGEMENT    Assessment     Today's INR result of 1.80 is Subtherapeutic (goal INR of 2.0-3.0)        Warfarin recently held as instructed which may be affecting INR    - held 2 days doses on 11/17-18, as instructed.    No new diet changes affecting INR    No new medication/supplements affecting INR    Continues to tolerate warfarin with no reported s/s of bleeding or thromboembolism     Previous INR was Supratherapeutic at 4.70 on 11/17/20.    Reported a FALL and scraped her knee on Thanksgiving Day.  Otherwise, no serious injuries.  Doing OK with no bleeding / bruising.    Plan:     Spoke on phone with Valentina regarding INR result and instructed:    1.  Also called and LM with RAJEEV Smith Case Mgr - [phone]    Warfarin Dosing Instructions:  (evenings. Has 5mg tabs).   - tonight, advised one time booster with 10mg warfarin dose.   - Change warfarin dose to 7.5 mg daily.   - (5 % change)    Instructed patient to follow up no later than:  1-2 wks.   - INR scheduled on 12/8/20 @ [facility].    Education provided: importance of consistent vitamin K intake, target INR goal and significance of current INR result and importance of notifying clinic for changes in medications    Valentina verbalizes understanding and agrees to warfarin dosing plan.    Instructed to call the [facility] for any changes, questions or concerns. (#[phone])   ?   Shonna Ernandez RN    Subjective/Objective:      Valentina ORNELAS Juan Ramonjuan diego, a 60 y.o. female is on warfarin.     Valentina reports:     Home warfarin dose: as updated on anticoagulation calendar per template     Missed doses: No     Medication changes:  No     S/S of bleeding or thromboembolism:  No     New Injury  or illness:  Yes:  Reported a FALL Thanksgiving night and ambulance came to help her up.     Changes in diet or alcohol consumption:  No     Upcoming surgery, procedure or cardioversion:  No    Anticoagulation Episode Summary     Current INR goal:  2.0-3.0   TTR:  34.3 % (1 y)   Next INR check:  12/14/2020   INR from last check:  1.80 (11/30/2020)   Weekly max warfarin dose:     Target end date:     INR check location:     Preferred lab:     Send INR reminders to:  [facility]    Indications    Pulmonary embolism with infarction (H) [I26.99]           Comments:           Anticoagulation Care Providers     Provider Role Specialty Phone number    Donald Rooney MD Referring Internal Medicine [phone]
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
## 2021-06-17 NOTE — PROGRESS NOTES
"Mental Health Psychotherapy Telephone Note    Patient: Valentina Olmedo    : 1960 MRN: [id number]    Completed a 2 point identification verification: patient verbalized full name and date of birth.     Date: 2021  Start time: 11:05am   Stop Time: 11:50   Session # 4    The patient has been notified of the following:   \"We have found that certain health care needs can be provided without the need for a face to face visit.  This service lets us provide the care you need with a phone conversation.  I will have full access to your [facility] medical record during this entire phone call.   I will be taking notes for your medical record. Since this is like an office visit, we will bill your insurance company for this service.  There are potential benefits and risks of telephone visits (e.g. limits to patient confidentiality) that differ from in-person visits.?  Confidentiality still applies for telephone services, and nobody will record the visit.  It is important to be in a quiet, private space that is free of distractions (including cell phone or other devices) during the visit.?? If during the course of the call I believe a telephone visit is not appropriate, you will not be charged for this service\"  Consent has been obtained for this service by care team member: Yes, per verbal agreement   Session Type: Patient is participating in a telemedicine phone visit.  She does not utilize smart phone for video visit, does not have wifi for video visit.    Those present for this session: patient, therapist    Chief Complaint   Patient presents with     MH Follow Up     Telephone visit     Anxiety     Anxiety with attempt to get onto video visit     Depression     Mild low mood with difficulty getting onto telemedicine visit     Schizophrenia     Schizoaffective disorder with any psychosis      Borderline personality disorder     low frustration tolerance       New symptoms or complaints: None    Functional Impairment: " "  Personal: 3  Family: 2  Work: 4  Social:4          ASSESSMENT: Current Emotional / Mental Status (status of significant symptoms):              Risk status (Self / Other harm or suicidal ideation)              Patient reports efforts to stay safe during pandemic with social distancing, face mask.              Patient denies current or recent suicidal ideation or behaviors.              Patient denies current or recent homicidal ideation or behaviors.              Patient denies current or recent self injurious behavior or ideation.              Patient denies other safety concerns.              Patient denies change in risk factors.              Patient denies change in protective factors. States her spouse and \"not wanting to be committed\" are protective factors.              Recommended that patient call 911 or go to the local ED should there be a change in any of these risk factors.                Attitude:                                   Cooperative  Sunil              Orientation:                             Oriented x3              Speech                          Rate / Production:       Normal/ Responsive                          Volume:                       Normal               Mood:                                      Anxious  Euthymic              Thought Content:                    Clear               Thought Form:                        Coherent  Logical               Insight:                                     Fair  and External locus      Patient's impression of their current status:   \"I can't use my phone at all.\"  \"I'm worried I'm not going to be able to stay in my home if I can't walk.\"  Patient called on her 's phone per patient request.  Patient reports mood is anxious.  She worries about being able to stay in her home given chronic pain and difficulty walking due to deconditioning.    Therapist impression of patients current state: This 60-year-old  female with long " history of mental health difficulties including anxiety, depression, PTSD, borderline personality disorder, and remote history of hearing voices returns for psychotherapy telephone visit. Patient has had chronic problems with emotional regulation but has done much better over recent years.  No suicidal ideation or thoughts of self-harm.  Occasional complaints of nightmares related to chronic PTSD that will wake her up and create difficulty going back to sleep.  These continue but have some what improved now that she's received COVID-19 vax.  No acute distress noted today. She is open and cooperative in session.  Processed negative cognitions, reinforced strengths, validated patient efforts and feelings.  Reviewed diagphragmatic breathing and grounding that she can do when nightmares are difficult for her.  Discussed that all medications have side affects and that she should discuss same with her psychiatry provider.   Attempted to get patient up on video visit today.  She could get audio, writer can see patient but patient unable to see provider.  Problem-solved in session, patient states she can go to the [employer] to see if they can help her with settings on her phone.  Discussed she can get referral to in person provider more local to her home if needed.    Type of psychotherapeutic technique provided: Client centered, Solution-focused, CBT and Mindfulness.    Progress toward short term goals:  Mixed Progress:     Review of long term goals:  Treatment plan reviewed and plan formulated.      Diagnosis:  1. Schizoaffective disorder, depressive type (H)    2. Generalized anxiety disorder    3. Borderline personality disorder (H)      **slightly worsened complaints of anxiety around medical and mobility issues    Plan and Follow up: Patient will return for follow-up care in 4 weeks. She will practice diagphragmatic breathing 1x/ daily, at least 4x/week (shooting for 6), so that this is helpful to her when she has  nightmares.  She will practice progressive muscle relaxation for pain, continue efforts to walk with PCA.  She will visit [employer] store to get her phone ringer turned on, text functional, and ask about getting help getting video up.  Discuss with them whether unlimited data should work for video visits.    Alternative plan to request [facility] to locate in person provider more convenient to patient home in [place].    Discharge Criteria/Planning: Client has chronic symptoms and ongoing therapy for maintenance stability recommended.        I have reviewed the note as documented above.  This accurately captures the substance of my conversation with the patient.  As the provider I attest to compliance with applicable laws and regulations related to telemedicine.  Loretta Barney, LICSW 5/5/2021

## 2021-06-17 NOTE — PROGRESS NOTES
"Mental Health Visit Note    4/26/2018    Start time: 1:01pm    Stop Time: 1:56pm Session # 5     Valentina Olmedo is a 58 y.o. female is being seen today for    Chief Complaint   Patient presents with      Follow Up     Depression     Anxiety   .     New symptoms or complaints: None    Functional Impairment:   Personal: 3  Family: 2  Work: 4  Social: 4    Clinical assessment of mental status: Patient's grooming is Well groomed, his attire is Appropriate, and his age appears Appears Stated. Behavior towards examiner is Cooperative, motor activity is Retarded, and eye contact is Staring.  Patient's mood is Depressed, and affect is Blunted and Flat.  Speech/language is Delayed/Hesitant, attention is Distractible, and concentration is Brief. Thought process is Slow, thought content is Within noraml and  Within normal.  Patient's orientation is X 3, memory is  Impairment and Recent, judgement is Impairment and Minimal, and estimated intelligence is Below Average. Demonstrated insight is Adequate while fund of knowledge is adequate.    Suicidal/Homicidal Ideation present: Yes: Patient initially denies, then admits suicidal ideation only when she completes assessment scales for treatment plan.  She states \"I would never do it though.  I'm not doing it again.\"  She agrees to saftey plan to call crisis or come to ER if she has impulses to harm herself.    Patient's impression of their current status:    Patient reports mood as \"better, less depressed\".  Unable to identify why she may be feeling better.  Admits she's \"really tired\" today: processes some distress over together with her  getting his SSDI court dates mixed up and having to get up \"too early on the wrong day\".  Some stress with forgetting to make auto payment.   Patient initially denies SI then admits suicidal ideation in the past two weeks only when she completes assessment scales for treatment plan.  She states \"I would never do it though.  I'm not doing " "it again.\"  She agrees to mickey plan to call crisis or come to ER if she has impulses to harm herself.     Mixed follow up on therapy homework: Did schedule neuro-psych testing at her neurologist.  She cannot recall if she reviewed results. She tried to work with spouse on remembering these appts, has not seen writer in 6 weeks but says she was sick and then didn't have $ for parking.  She \"sometimes\" got up and dressed to be with spouse in living room and to activate by going out to stores, etc.    Therapist impression of patients current state: Baseline depressive and anxiety sx continue per assessment scales but reports these are improved. He makes some use of supportive psychotherapy. Slowed, slightly confused thinking continues.  Sx do not appear to be getting in the way of stable community living but writer suspect dependence on spouse, who appears to have multiple challenges of his own.  Continues to be able to maintain community functioning outside of ED and inpatient MH.    Processed negative cognitions, reinforced strengths, validated patient efforts and feelings.  We brainstormed ways to remind herself, with assistance of spouse, using calendar, asking for extra reminder appointments from clinic staff.  She continues to insist she needs therapy appts for support and anxiety management.  Patient completed assessment scales for the purposes of treatment planning:   RUY 7:  RUY-7 Total: 15, PHQ9 :  PHQ-9 Total Score: 16, PANSI Negative:  PANSI Negative average: 2.38 and PANSI Positive:  PANSI positive average: 2.17    Type of psychotherapeutic technique provided: Client centered, Solution-focused and CBT    Progress toward short term goals: Minimal progress: Did schedule neuro-psych testing at her neurologist.  She cannot recall if she reviewed results. She tried to work with spouse on remembering these appts.  She \"sometimes\" got up and dressed to be with spouse in living room and to activate by going out " "to stores, etc.    Review of long term goals: Long-term goals reviewed   and Treatment Plan updated    Diagnosis:   1. Generalized anxiety disorder    2. Depression, major, recurrent, moderate    3. Borderline personality disorder    4. Cognitive deficits        Plan and Follow up: Patient will return for follow up in two weeks. She will use \"Fifty Ways to Take a Break\" for frustration/emotional management. She agrees to try to get up daily, get dressed, be with spouse in living room and to activate by going out to stores, and do some walking there.    Discharge Criteria/Planning: Client has chronic symptoms and ongoing therapy for maintenance stability recommended.    Loretta Barney MSW, LICSW  4/26/2018  "

## 2021-06-17 NOTE — TELEPHONE ENCOUNTER
Telephone Encounter by Gustavo Gamez RN at 3/18/2021  2:15 PM     Author: Gustavo Gamez RN Service: -- Author Type: Registered Nurse    Filed: 3/18/2021  2:31 PM Encounter Date: 3/18/2021 Status: Signed    : Gustavo Gamez RN (Registered Nurse)       ANTICOAGULATION  MANAGEMENT    Assessment     Today's INR result of 1.8 is Subtherapeutic (goal INR of 2.0-3.0)        Warfarin taken as previously instructed    No new diet changes affecting INR    No new medication/supplements affecting INR    Continues to tolerate warfarin with no reported s/s of bleeding or thromboembolism     Previous INR was Therapeutic    Plan:     Spoke on phone with Valentina regarding INR result and instructed:      Warfarin Dosing Instructions:  Continue current warfarin dose 10 mg daily     Instructed patient to follow up no later than: two weeks    Valentina verbalizes understanding and agrees to warfarin dosing plan.    Instructed to call the [facility] for any changes, questions or concerns. (#[phone])   ?   Gustavo Gamez RN    Subjective/Objective:      Valentina Olmedo, a 61 y.o. female is on warfarin. Valentina Montgomery reports:     Home warfarin dose: as updated on anticoagulation calendar per template     Missed doses: No     Medication changes:  No     S/S of bleeding or thromboembolism:  No     New Injury or illness:  No     Changes in diet or alcohol consumption:  No     Upcoming surgery, procedure or cardioversion:  No    Anticoagulation Episode Summary     Current INR goal:  2.0-3.0   TTR:  31.2 % (1 y)   Next INR check:  4/1/2021   INR from last check:  1.80 (3/18/2021)   Weekly max warfarin dose:     Target end date:     INR check location:     Preferred lab:     Send INR reminders to:  [facility]    Indications    History of pulmonary embolism [Z86.711]           Comments:           Anticoagulation Care Providers     Provider Role Specialty Phone number    Donald Rooney MD Referring Internal Medicine  [phone]    Promise Vanegas MD [facility] [phone]

## 2021-06-17 NOTE — PROGRESS NOTES
[facility] Discharge Summary  Patient Name: Valentina Olmedo  Date: 5/9/2018  Referral Diagnosis: balance  Referring provider: Donald Rooney MD  Visit Diagnosis:   1. Gait instability     2. Generalized muscle weakness     3. Decreased strength, endurance, and mobility         Goals:  Pt. will be independent with home exercise program in : 4 weeks  Pt will: be able to increase APTA sit-to-stand by 2+ to demonstrate improved function within 6 weeks. (Improving.)  Pt will: decrease TUG by 2 seconds or more to demonstrate improved mobility within 5 weeks. (Not assessed)  Pt will: improve 2 minute walk test of 5+ meters in 2 minutes withni 6 weeks. (Not assessed)  Pt will: require no assistance of arms with sit-to-stand within 5 weeks. (Not Met)    Patient was seen for 4 visits from initial evaluation to 3/20/18 with 3 missed appointments.  The patient attended therapy initially, but did not finish the therapy sessions prescribed.  Goals were not fully achieved. Explanation for goals not achieved: did not return to PT.  Patient received a home program   The patient discontinued therapy, did not return.  No further therapy is required at this time.  Pt had been progressing with PT and showing improvements in strength and activity tolerance. Pt continues to have issues with dynamic balance. Pt was very inconsistent with coming to PT and memory may have contributed.     Therapy will be discontinued at this time.  The patient will need a new referral to resume.    Thank you for your referral.  Evans Calderon, PT, DPT  5/9/2018  2:56 PM

## 2021-06-17 NOTE — TELEPHONE ENCOUNTER
Telephone Encounter by Sara Crow RN at 1/26/2021  2:03 PM     Author: Sara Crow RN Service: -- Author Type: Registered Nurse    Filed: 1/26/2021  2:09 PM Encounter Date: 1/26/2021 Status: Signed    : Sara Crow RN (Registered Nurse)       ANTICOAGULATION  MANAGEMENT    Assessment     Today's INR result of 1.4 is Subtherapeutic (goal INR of 2.0-3.0)        Warfarin taken as previously instructed    No new diet changes affecting INR    No new medication/supplements affecting INR    Continues to tolerate warfarin with no reported s/s of bleeding or thromboembolism     Previous INR was Subtherapeutic    Plan:     Spoke on phone with Valentina regarding INR result and instructed:     Warfarin Dosing Instructions:  Change warfarin dose to 12.5 mg daily on Tues, Thurs, Sat; and 10 mg daily rest of week  (14 % change)    Instructed patient to follow up no later than: 5-7 days     Education provided: impact of vitamin K foods on INR, importance of therapeutic range, target INR goal and significance of current INR result, monitoring for clotting signs and symptoms and when to seek medical attention/emergency care    Valentian verbalizes understanding and agrees to warfarin dosing plan.    Instructed to call the [facility] for any changes, questions or concerns. (#[phone])   ?   Sara Crow RN    Subjective/Objective:      Valentina JOHNSON Olmedo, a 60 y.o. female is on warfarin.     Valentina reports:     Home warfarin dose: verbally confirmed home dose with Valentina and updated on anticoagulation calendar     Missed doses: No     Medication changes:  No     S/S of bleeding or thromboembolism:  No     New Injury or illness:  No     Changes in diet or alcohol consumption:  No     Upcoming surgery, procedure or cardioversion:  No    Anticoagulation Episode Summary     Current INR goal:  2.0-3.0   TTR:  29.0 % (1 y)   Next INR check:  2/2/2021   INR from last check:  1.40 (1/26/2021)   Weekly max warfarin dose:     Target end date:      INR check location:     Preferred lab:     Send INR reminders to:  [facility]    Indications    History of pulmonary embolism [Z86.711]           Comments:           Anticoagulation Care Providers     Provider Role Specialty Phone number    Donald Rooney MD Referring Internal Medicine [phone]    Promise Vanegas MD Responsible Family Medicine [phone]

## 2021-06-17 NOTE — TELEPHONE ENCOUNTER
Telephone Encounter by Donna Marquez RN at 1/18/2021  3:25 PM     Author: Donna Marquez RN Service: -- Author Type: Registered Nurse    Filed: 1/18/2021  4:13 PM Encounter Date: 1/18/2021 Status: Signed    : Donna Marquez RN (Registered Nurse)       ANTICOAGULATION  MANAGEMENT    Assessment     Today's INR result of 1.5 is Subtherapeutic (goal INR of 2.0-3.0)        Warfarin taken as previously instructed    No new diet changes affecting INR    No new medication/supplements affecting INR    Continues to tolerate warfarin with no reported s/s of bleeding or thromboembolism     Previous INR was Therapeutic    Plan:     Spoke on phone with Valentina regarding INR result and instructed:     Warfarin Dosing Instructions:  Change warfarin dose to 7.5 mg daily on Sunday; and 10 mg daily rest of week  (12.5 % change)    Instructed patient to follow up no later than: 1/26/21-- already scheduled    Education provided: importance of therapeutic range, target INR goal and significance of current INR result, monitoring for clotting signs and symptoms and potential interaction with smoking and warfarin    Valentina verbalizes understanding and agrees to warfarin dosing plan.    Instructed to call the [facility] for any changes, questions or concerns. (#[phone])   ?   Donna Marquez RN    Subjective/Objective:      Valentina Olmedo, a 60 y.o. female is on warfarin.     Valentina reports:     Home warfarin dose: as updated on anticoagulation calendar per template     Missed doses: No     Medication changes:  No-- reports taking tegretol consistently as prescribed.     S/S of bleeding or thromboembolism:  No     New Injury or illness:  No     Changes in diet or alcohol consumption:  No     Upcoming surgery, procedure or cardioversion:  No    Anticoagulation Episode Summary     Current INR goal:  2.0-3.0   TTR:  31.1 % (1 y)   Next INR check:  1/26/2021   INR from last check:  1.50 (1/18/2021)   Weekly max  warfarin dose:     Target end date:     INR check location:     Preferred lab:     Send INR reminders to:  [facility]    Indications    History of pulmonary embolism [Z86.711]           Comments:           Anticoagulation Care Providers     Provider Role Specialty Phone number    Doanld Rooney MD Referring Internal Medicine [phone]    Promise Vanegas MD Responsible Family Medicine [phone]

## 2021-06-17 NOTE — TELEPHONE ENCOUNTER
ANTICOAGULATION  MANAGEMENT    Assessment     Today's INR result of 2.4 is Therapeutic (goal INR of 2.0-3.0)        Warfarin taken as previously instructed    No new diet changes affecting INR    No new medication/supplements affecting INR    Continues to tolerate warfarin with no reported s/s of bleeding or thromboembolism     Previous INR was Subtherapeutic    Plan:     Spoke on phone with Valentina regarding INR result and instructed:      Warfarin Dosing Instructions:  Continue current warfarin dose 10 mg daily     Instructed patient to follow up no later than: two weeks         Valentina verbalizes understanding and agrees to warfarin dosing plan.    Instructed to call the [facility] for any changes, questions or concerns. (#[phone])   ?   Gustavo Gamez RN    Subjective/Objective:      Valentina Olmedo, a 61 y.o. female is on warfarin. Valentina Montgomery reports:     Home warfarin dose: verbally confirmed home dose with Valentina and updated on anticoagulation calendar     Missed doses: No     Medication changes:  No     S/S of bleeding or thromboembolism:  No     New Injury or illness:  No     Changes in diet or alcohol consumption:  No     Upcoming surgery, procedure or cardioversion:  No    Anticoagulation Episode Summary     Current INR goal:  2.0-3.0   TTR:  38.2 % (1 y)   Next INR check:  5/18/2021   INR from last check:  2.40 (5/4/2021)   Weekly max warfarin dose:     Target end date:     INR check location:     Preferred lab:     Send INR reminders to:  [facility]    Indications    History of pulmonary embolism [Z86.711]           Comments:           Anticoagulation Care Providers     Provider Role Specialty Phone number    Donald Rooney MD Referring Internal Medicine [phone]    Promise Vanegas MD Responsible Family Medicine [phone]

## 2021-06-17 NOTE — PATIENT INSTRUCTIONS - HE
1. Amoxicillin 500 mg twice daily for 10 days.  2. Flonase 1 spray each nostril daily. Continue until July 1. May take long term if needed.   3. Claritin 10 mg daily. Continue until July 1. May take long term if needed.
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
## 2021-06-17 NOTE — PROGRESS NOTES
Care Guide was meeting with the patient's  at the [facility]  Patient accompanied her  to the appointment    During the appointment, the patient was asking questions in regard to herself  She asked if she had medical transportation  Since she is a [facility] patient I checked her chart to verify what she has for insurance  Valentina Olmedo  [place]: 1960  I found she has Medicare Part A and B  No supplemental insurance is listed  I checked [place] and nothing pulled up  Unsure how she is getting her medications since Medicare Part A and B doesn't cover prescriptions    Discussed with her that if she would like a Care Guide, my co-worker Dionne, is at the [facility] and could enroll her  Patient agreed with this  I informed her I would reach out to Dionne [facility] Care Guide    On, 18, I connected with Dionne Olivas, [facility] Care Guide at the [facility]  Informed her of the above information in regard to the patient  Explained the patient would like to enroll with [facility] as well  Dionne is sending the patient's PCP a request to place the order  Dionne will then reach out to her to begin enrollment    I informed Dionne of what I am working on with the patient's   Dionne and I will continue to coordinate with each other in regard to goals the patients may have in regard to things that impact the household to prevent possible duplication of care  Dionne understands the patient's 's [employer] Worker is helping with housing so [facility] does not need to get involved at this time  I have already informed both patients that if in the future the [employer] Worker is no longer helping, we could then begin a goal around housing

## 2021-06-17 NOTE — TELEPHONE ENCOUNTER
Telephone Encounter by Shonna Ernandez RN at 12/29/2020  2:07 PM     Author: Shonna Ernandez RN Service: -- Author Type: Registered Nurse    Filed: 12/29/2020  3:47 PM Encounter Date: 12/29/2020 Status: Signed    : Shonna Ernandez RN (Registered Nurse)       ANTICOAGULATION  MANAGEMENT    Assessment     Today's INR result of 1.90 is Subtherapeutic (goal INR of 2.0-3.0)        Warfarin taken as previously instructed    No new diet changes affecting INR    No new medication/supplements affecting INR    Continues to tolerate warfarin with no reported s/s of bleeding or thromboembolism     Previous INR was Subtherapeutic at 1.80 on 12/8/20.    Plan:     Spoke on phone with  regarding INR result and instructed:     Warfarin Dosing Instructions:    - Change warfarin dose to 10 mg daily on Tues/Thurs/Sat; and 7.5 mg daily rest of week.   - (4.3 % change)    Instructed patient to follow up no later than: 1-2 wks.   - INR schedule don 1/19/21 @ [facility].    Education provided: importance of consistent vitamin K intake, target INR goal and significance of current INR result and importance of notifying clinic for changes in medications    Johanna RN verbalizes understanding and agrees to warfarin dosing plan.    Instructed to call the [facility] for any changes, questions or concerns. (#[phone])   ?   Shonna Ernandez RN    Subjective/Objective:      Valentina JOHNSON Delatorrejuan diego, a 60 y.o. female is on warfarin.     Valentina reports:     Home warfarin dose: as updated on anticoagulation calendar per template     Missed doses: No     Medication changes:  No     S/S of bleeding or thromboembolism:  No     New Injury or illness:  Yes: reported a fall and scraped right elbow and hit her head on the lamp shade.     Changes in diet or alcohol consumption:  No     Upcoming surgery, procedure or cardioversion:  No    Anticoagulation Episode Summary     Current INR goal:  2.0-3.0   TTR:  30.5 % (1 y)   Next INR check:   1/12/2021   INR from last check:  1.90 (12/29/2020)   Weekly max warfarin dose:     Target end date:     INR check location:     Preferred lab:     Send INR reminders to:  [facility]    Indications    Pulmonary embolism with infarction (H) [I26.99]           Comments:           Anticoagulation Care Providers     Provider Role Specialty Phone number    Donald Rooney MD Referring Internal Medicine [phone]

## 2021-06-17 NOTE — TELEPHONE ENCOUNTER
Telephone Encounter by Shonna Ernandez RN at 12/8/2020  1:48 PM     Author: Shonna Ernandez RN Service: -- Author Type: Registered Nurse    Filed: 12/8/2020  2:41 PM Encounter Date: 12/8/2020 Status: Signed    : Shonna Ernandez RN (Registered Nurse)       ANTICOAGULATION  MANAGEMENT    Assessment     Today's INR result of 1.80 is Subtherapeutic (goal INR of 2.0-3.0)        Warfarin taken as previously instructed    No new diet changes affecting INR    Potential interaction between Melatonin and warfarin which may affect subsequent INRs    - started Melatonin 3mg at bedtime, PRN for insomnia.    Continues to tolerate warfarin with no reported s/s of bleeding or thromboembolism     Previous INR was Subtherapeutic at 1.80 on 11/30/20.    Plan:     Spoke on phone with Valentina regarding INR result and instructed:   1.  Also leave message with RAJEEV Smith - [phone].  She sets up medications for Valentina.    Warfarin Dosing Instructions:   (evenings. Has 5mg tabs)   - Change warfarin dose to 10 mg daily on Tues/Fri; and 7.5 mg daily rest of week.   - (9.5 % change)    Instructed patient to follow up no later than:  2 wks.   - INR scheduled on 12/29/20 to coincide with her PT @ [facility].    Education provided: target INR goal and significance of current INR result and importance of notifying clinic for changes in medications    Valentina verbalizes understanding and agrees to warfarin dosing plan.    Instructed to call the [facility] for any changes, questions or concerns. (#[phone])   ?   Shonna Ernandez RN    Subjective/Objective:      Valentina ORNELAS Honorio, a 60 y.o. female is on warfarin.     Valentina reports:     Home warfarin dose: as updated on anticoagulation calendar per template     Missed doses: No     Medication changes:  Yes:  Takes Melatonin PRN for sleep.     S/S of bleeding or thromboembolism:  No     New Injury or illness:  No     Changes in diet or alcohol consumption:  No     Upcoming surgery,  procedure or cardioversion:  No    Anticoagulation Episode Summary     Current INR goal:  2.0-3.0   TTR:  33.7 % (1 y)   Next INR check:  12/22/2020   INR from last check:  1.80 (12/8/2020)   Weekly max warfarin dose:     Target end date:     INR check location:     Preferred lab:     Send INR reminders to:  [facility]    Indications    Pulmonary embolism with infarction (H) [I26.99]           Comments:           Anticoagulation Care Providers     Provider Role Specialty Phone number    Donald Rooney MD Referring Internal Medicine [phone]

## 2021-06-17 NOTE — TELEPHONE ENCOUNTER
Telephone Encounter by Anabella Tapia RN at 3/31/2021  8:58 PM     Author: Anabella Tapia RN Service: -- Author Type: Registered Nurse    Filed: 3/31/2021  9:00 PM Encounter Date: 3/31/2021 Status: Signed    : Anabella Tapia RN (Registered Nurse)       RN cannot approve Refill Request    RN can NOT refill this medication med is not covered by policy/route to provider. Last office visit: 3/23/2021 Promise Vanegas MD Last Physical: Visit date not found Last MTM visit: Visit date not found Last visit same specialty: 3/23/2021 Promise Vanegas MD.  Next visit within 3 mo: Visit date not found  Next physical within 3 mo: Visit date not found      [facility] Triage/Med Refill 3/31/2021    Requested Prescriptions   Pending Prescriptions Disp Refills   ? warfarin ANTICOAGULANT (COUMADIN/JANTOVEN) 5 MG tablet [Pharmacy Med Name: Warfarin Sodium 5MG TABS] 56 tablet 0     Sig: TAKE 1 & 1/2 TABLETS - 2 TABLETS BY MOUTH DAILY AS DIRECTED ADJUST DOSE BASED ON INR RESULTS       Warfarin Refill Protocol  Failed - 3/31/2021  3:33 PM        Failed -  Route to appropriate pool/provider     Last Anticoagulation Summary:   Anticoagulation Episode Summary     Current INR goal:  2.0-3.0   TTR:  28.9 % (11.7 mo)   Next INR check:  4/1/2021   INR from last check:  1.80 (3/18/2021)   Weekly max warfarin dose:     Target end date:     INR check location:     Preferred lab:     Send INR reminders to:  [facility]    Indications    History of pulmonary embolism [Z86.711]           Comments:           Anticoagulation Care Providers     Provider Role Specialty Phone number    Donald Rooney MD Referring Internal Medicine [phone]    Promise Vanegas MD Responsible Family Medicine [phone]                Passed - Provider visit in last year     Last office visit with prescriber/PCP: 3/23/2021 Promise Vanegas MD OR same dept: 3/23/2021 Promise Vanegas MD OR same specialty:  3/23/2021 Promise Vanegas MD  Last physical: Visit date not found Last MTM visit: Visit date not found    Next appt within 3 mo: Visit date not found Next physical within 3 mo: Visit date not found  Prescriber OR PCP: Promise Vanegas MD  Last diagnosis associated with med order: 1. Pulmonary embolism with infarction (H)  - warfarin ANTICOAGULANT (COUMADIN/JANTOVEN) 5 MG tablet [Pharmacy Med Name: Warfarin Sodium 5MG TABS]; TAKE 1 & 1/2 TABLETS - 2 TABLETS BY MOUTH DAILY AS DIRECTED ADJUST DOSE BASED ON INR RESULTS  Dispense: 56 tablet; Refill: 0    2. Long term (current) use of anticoagulants  - warfarin ANTICOAGULANT (COUMADIN/JANTOVEN) 5 MG tablet [Pharmacy Med Name: Warfarin Sodium 5MG TABS]; TAKE 1 & 1/2 TABLETS - 2 TABLETS BY MOUTH DAILY AS DIRECTED ADJUST DOSE BASED ON INR RESULTS  Dispense: 56 tablet; Refill: 0    If protocol passes may refill for 6 months if within 3 months of last provider visit (or a total of 9 months).

## 2021-06-17 NOTE — TELEPHONE ENCOUNTER
Telephone Encounter by Shonna Ernandez RN at 10/30/2020  1:31 PM     Author: Shonna Ernandez RN Service: -- Author Type: Registered Nurse    Filed: 10/30/2020  4:06 PM Encounter Date: 10/30/2020 Status: Signed    : Shonna Ernandez RN (Registered Nurse)       ANTICOAGULATION  MANAGEMENT    Assessment     Today's INR result of 1.70 is Subtherapeutic (goal INR of 2.0-3.0)        Warfarin taken as previously instructed    - had been late (9-10p) on taking some of her warfarin doses.  (usually takes @ 5p)    No new diet changes affecting INR    No new medication/supplements affecting INR    Continues to tolerate warfarin with no reported s/s of bleeding or thromboembolism     Previous INR was Therapeutic at 2.30 on 10/2/20.    Plan:     Spoke on phone with Valentina regarding INR result and instructed:    1.  Also called RAJEEV Smith nurse mgr with telephone orders - [phone].    Warfarin Dosing Instructions:  (takes @ 5pm. Has 5mg tabs)   - tonight, advised one time booster with 10mg warfarin dose,   - then continue current warfarin dose 10 mg daily on Sun/Thurs; and 7.5 mg daily rest of week.    Instructed patient to follow up no later than:  2 wks.    Education provided: target INR goal and significance of current INR result and importance of taking warfarin as instructed    Valentina verbalizes understanding and agrees to warfarin dosing plan.    Instructed to call the [facility] for any changes, questions or concerns. (#[phone])   ?   Shonna Ernandez RN    Subjective/Objective:      Valentina Olmedo, a 60 y.o. female is on warfarin.     Valentina reports:     Home warfarin dose: as updated on anticoagulation calendar per template     Missed doses: No     Medication changes:  No     S/S of bleeding or thromboembolism:  No     New Injury or illness:  No     Changes in diet or alcohol consumption:  No     Upcoming surgery, procedure or cardioversion:  No    Anticoagulation Episode Summary     Current INR  goal:  2.0-3.0   TTR:  37.2 % (1 y)   Next INR check:  10/30/2020   INR from last check:  1.70 (10/30/2020)   Weekly max warfarin dose:     Target end date:     INR check location:     Preferred lab:     Send INR reminders to:  [facility]    Indications    Pulmonary embolism with infarction (H) [I26.99]           Comments:           Anticoagulation Care Providers     Provider Role Specialty Phone number    Donald Rooney MD Referring Internal Medicine [phone]

## 2021-06-17 NOTE — TELEPHONE ENCOUNTER
"Telephone Encounter by Shonna Ernandez RN at 11/17/2020 12:54 PM     Author: Shonna Ernandez RN Service: -- Author Type: Registered Nurse    Filed: 11/17/2020  2:59 PM Encounter Date: 11/17/2020 Status: Signed    : Shonna Ernandez RN (Registered Nurse)       ANTICOAGULATION  MANAGEMENT    Assessment     Today's INR result of 4.70 is Supratherapeutic (goal INR of 2.0-3.0)        Warfarin taken as previously instructed    reported, at times eating 1 meal per day may be affecting diet and INR    No new medication/supplements affecting INR    - restarted Rosuvastatin 20mg daily.    Continues to tolerate warfarin with no reported s/s of bleeding or thromboembolism    - \"last week stubbed her toenail and bled and took a long time to stop\"    Previous INR was Subtherapeutic at 1.70 on 10/30/20.    Plan:     Spoke on phone with Valentina regarding INR result and instructed:    1.  Also left detailed message with Angel Smith Case Mgr with [facility] with INR and warfarin orders.    Warfarin Dosing Instructions:   (evenings. has 5mg tabs)   - advised to HOLD warfarin for 2 days, 11/17-18,   - then change warfarin dose to 5 mg daily on Wednesdays; and 7.5 mg daily rest of week.   - (13 % change)    Instructed patient to follow up no later than:  7-10 days.   - INR scheduled on 11/30/20 @ [facility].    Education provided: importance of consistent vitamin K intake, impact of vitamin K foods on INR, target INR goal and significance of current INR result, monitoring for bleeding signs and symptoms and when to seek medical attention/emergency care    Valentina verbalizes understanding and agrees to warfarin dosing plan.    Instructed to call the [facility] for any changes, questions or concerns. (#[phone])   ?   Shonna Ernandez RN    Subjective/Objective:      Valentina Olmedo, a 60 y.o. female is on warfarin.     Valentina reports:     Home warfarin dose: as updated on anticoagulation calendar per " template     Missed doses: No     Medication changes:  Yes: reported restarted on Rovustatin.     S/S of bleeding or thromboembolism:  Yes: stubbed her toenail, which bled and bled.     New Injury or illness:  No     Changes in diet or alcohol consumption:  Yes.  Reported, at times she is not hungry and will only eat one meal per day.     Upcoming surgery, procedure or cardioversion:  No    Anticoagulation Episode Summary     Current INR goal:  2.0-3.0   TTR:  36.6 % (1 y)   Next INR check:  11/27/2020   INR from last check:  4.70 (11/17/2020)   Weekly max warfarin dose:     Target end date:     INR check location:     Preferred lab:     Send INR reminders to:  [facility]    Indications    Pulmonary embolism with infarction (H) [I26.99]           Comments:           Anticoagulation Care Providers     Provider Role Specialty Phone number    Donald Rooney MD Referring Internal Medicine [phone]

## 2021-06-17 NOTE — TELEPHONE ENCOUNTER
Telephone Encounter by Gustavo Gamez RN at 3/11/2021 12:45 PM     Author: Gustavo Gamez RN Service: -- Author Type: Registered Nurse    Filed: 3/11/2021  1:05 PM Encounter Date: 3/11/2021 Status: Signed    : Gustavo Gamez RN (Registered Nurse)       ANTICOAGULATION  MANAGEMENT    Assessment     Today's INR result of 4.2 is Supratherapeutic (goal INR of 2.0-3.0)        Warfarin taken as previously instructed    No new diet changes affecting INR    No new medication/supplements affecting INR    Continues to tolerate warfarin with no reported s/s of bleeding or thromboembolism     Previous INR was Therapeutic     Faxed to [phone]    Plan:     Left detailed message for nurse Smith and talked with  Valentina regarding INR result and instructed:      Warfarin Dosing Instructions:  hold today then change warfarin dose to 10 mg daily    Instructed patient to follow up no later than: one week scheduled 3/18 1pm at [place]      Instructed to call the [facility] for any changes, questions or concerns. (#[phone])   ?   Gustavo Gamez RN    Subjective/Objective:      Valentina Olmedo, a 61 y.o. female is on warfarin. Valentina Montgomery reports:     Home warfarin dose: verbally confirmed home dose with Valentina and updated on anticoagulation calendar     Missed doses: No     Medication changes:  No     S/S of bleeding or thromboembolism:  No     New Injury or illness:  No     Changes in diet or alcohol consumption:  No     Upcoming surgery, procedure or cardioversion:  No    Anticoagulation Episode Summary     Current INR goal:  2.0-3.0   TTR:  30.6 % (1 y)   Next INR check:  3/18/2021   INR from last check:  4.20 (3/11/2021)   Weekly max warfarin dose:     Target end date:     INR check location:     Preferred lab:     Send INR reminders to:  [facility]    Indications    History of pulmonary embolism [Z86.711]           Comments:           Anticoagulation Care Providers     Provider Role Specialty Phone number     Donald Rooney MD [facility] [phone]    Promise Vanegas MD [facility] Medicine [phone]

## 2021-06-17 NOTE — PROGRESS NOTES
Assessment/Plan:      Problem List Items Addressed This Visit     None      Visit Diagnoses     Acute non-recurrent pansinusitis    -  Primary    Relevant Medications    loratadine (CLARITIN) 10 mg tablet    amoxicillin (AMOXIL) 500 MG capsule    Seasonal allergic rhinitis due to pollen        Relevant Medications    loratadine (CLARITIN) 10 mg tablet    fluticasone propionate (FLONASE) 50 mcg/actuation nasal spray          Patient Instructions   1. Amoxicillin 500 mg twice daily for 10 days.  2. Flonase 1 spray each nostril daily. Continue until July 1. May take long term if needed.   3. Claritin 10 mg daily. Continue until July 1. May take long term if needed.        Return in about 10 days (around 5/16/2021) for recheck if not improving.    Subjective:   Valentina Olmedo is a 61 y.o. female who presents today with concerns about increased congestion and allergy symptoms for the past 3 weeks. She is also having itchy watery eyes. She is concerned about a possible sinus infection. No fever. She has been using some over the counter nasal antihistamines for a week but isn't using any other allergy medication. She was prescribed pataday but isn't using those either. She did test positive for COVID 3 weeks ago but feels strongly that this isn't related to these symptoms.    Patient Active Problem List   Diagnosis     Nonintractable epilepsy without status epilepticus (H)     Migraine Headache     Vitamin D Deficiency     COPD     Hypothyroidism     Mixed hyperlipidemia     Esophageal reflux     Stage 3a chronic kidney disease     Major depressive disorder, recurrent episode, moderate (H)     Anxiety / Depression / Borderline / PTSD - Dr. 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 F/up CT of the abdomen done on 6/26/12 showed a stable 5 mm nodular opacity in the right lower lung lobe, adjacent to the diagram. 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 According to the patient, the tumor was benign. She is not sure whether or not the tumor belonged to the thyroid gland. The surgery was done here at the [facility]. In January 2011, she was diagnosed with kidney stones. She was found to have an elevated calcium level of 11.7, with a PTH level of 368. Stone an     Hyperlipidemia      Hyperparathyroidism, s/p resection adenoma            Hypertension      Hyponatremia      Hypothyroidism      PTSD (post-traumatic stress disorder)      Pulmonary embolism with infarction (H) 1/12/2021     Recurrent otitis media      Seizure disorder (H)      Stroke (H)      Vertigo      Past Surgical History:   Procedure Laterality Date     ADRENALECTOMY Left      CARPAL TUNNEL RELEASE Bilateral      CRANIOTOMY FOR TEMPORAL LOBECTOMY Right     x3 for seizure     DILATION AND CURETTAGE OF UTERUS       LITHOTRIPSY       PARATHYROID GLAND SURGERY      resection of adenoma     REFRACTIVE  "SURGERY Bilateral      TONSILLECTOMY AND ADENOIDECTOMY       TOTAL THYROIDECTOMY       TOTAL VAGINAL HYSTERECTOMY      39 years old. Benign       Review of System: Relevant items noted in HPI. ROS otherwise negative.       Objective:     Vitals:    05/06/21 1314   BP: 110/82   Pulse: 76   Resp: 16   Temp: 97.8  F (36.6  C)   TempSrc: Oral   Weight: (!) 340 lb 8 oz (154.4 kg)   Height: 5' 6.5\" (1.689 m)       Physical Exam  Constitutional:       General: She is not in acute distress.     Appearance: She is not ill-appearing.   HENT:      Right Ear: Ear canal normal. A middle ear effusion is present. Tympanic membrane is not erythematous, retracted or bulging.      Left Ear: Ear canal normal. A middle ear effusion is present. Tympanic membrane is not erythematous, retracted or bulging.   Eyes:      Conjunctiva/sclera: Conjunctivae normal.      Pupils: Pupils are equal, round, and reactive to light.   Cardiovascular:      Rate and Rhythm: Normal rate and regular rhythm.   Pulmonary:      Effort: Pulmonary effort is normal.      Breath sounds: Normal breath sounds.       "

## 2021-06-17 NOTE — TELEPHONE ENCOUNTER
Telephone Encounter by Donna Marquez RN at 2/12/2021  3:28 PM     Author: Donna Marquez RN Service: -- Author Type: Registered Nurse    Filed: 2/12/2021  4:03 PM Encounter Date: 2/12/2021 Status: Signed    : Donna Marquez RN (Registered Nurse)       ANTICOAGULATION  MANAGEMENT    Assessment     Today's INR result of 3.4 is Supratherapeutic (goal INR of 2.0-3.0)        Warfarin taken as previously instructed-- possibly missed a dose last week    No new diet changes affecting INR    Interaction between tegretol dose decrease and warfarin may be affecting INR    Continues to tolerate warfarin with no reported s/s of bleeding or thromboembolism     Previous INR was Therapeutic    Plan:     Spoke on phone with Valentina regarding INR result and instructed:      Warfarin Dosing Instructions:  5mg today only then change warfarin dose to 12.5 mg daily on Tues; and 10 mg daily rest of week  (6.5 % change)    Instructed patient to follow up no later than: 2 weeks-- scheduled for 2/26    Education provided: target INR goal and significance of current INR result, importance of taking warfarin as instructed, potential interaction between warfarin and tegretol, importance of notifying clinic for changes in medications and monitoring for bleeding signs and symptoms    Valentina verbalizes understanding and agrees to warfarin dosing plan. Writer had Valentina read dosing instructions back to confirm understanding.  M for nurse [place] with dosing instructions and follow up date.     Instructed to call the [facility] for any changes, questions or concerns. (#[phone])   ?   Donna Marquez RN    Subjective/Objective:      Valentina Olmedo, a 60 y.o. female is on warfarin. Valentina Montgomery reports:     Home warfarin dose: as updated on anticoagulation calendar per template     Missed doses: Yes: missed a dose last week     Medication changes:  Yes: tegretol decreased 800mg daily now     S/S of bleeding or  thromboembolism:  No     New Injury or illness:  Yes: dizziness     Changes in diet or alcohol consumption:  No     Upcoming surgery, procedure or cardioversion:  No    Anticoagulation Episode Summary     Current INR goal:  2.0-3.0   TTR:  29.5 % (1 y)   Next INR check:  2/16/2021   INR from last check:  3.40 (2/12/2021)   Weekly max warfarin dose:     Target end date:     INR check location:     Preferred lab:     Send INR reminders to:  [facility]    Indications    History of pulmonary embolism [Z86.711]           Comments:           Anticoagulation Care Providers     Provider Role Specialty Phone number    Donald Rooney MD Referring Internal Medicine [phone]    Promise Vanegas MD Responsible Family Medicine [phone]

## 2021-06-17 NOTE — PROGRESS NOTES
"Prolia Injection Phone Screen      Screening questions have been asked 2-3 days prior to administration visit for Prolia. If any questions are answered with \"Yes,\" this phone encounter were will routed to ordering provider for further evaluation.     1.  When was the last injection?  10/05/2020    2.  Has insurance for this injection been verified?  Yes    3.  Did you experience any new onset achiness or rashes that lasted for over a month with your previous Prolia injection?   No     4.  Do you have a fever over 101?F or a new deep cough that is unusual for you today? No    5.  Have you started any new medications in the last 6 months that you were told could affect your immune system? These may have been prescribed by oncologist, transplant, rheumatology, or dermatology.   No    6.  In the last 6 months have you have gastric bypass or parathyroid surgery?   No    7.  Do you plan dental work requiring drilling into the bone such as implants/extractions or oral surgery in the next 2-3 months?   No    8. Do you have new insurance since the last injection?  No    9. Have you received the Covid-19 vaccine? Yes  If No - Proceed with Prolia injection  If Yes - Date of vaccination   COVID-19,PF,Pfizer 4/5/2021, 3/15/2021     Will need to wait until 2 weeks after 2nd dose of Covid-19 vaccine before administering Prolia       Patient informed if symptoms discussed above present prior to their administration appointment, they are to notify clinic immediately.     Lola Arce      The following steps were completed to comply with the REMS program for Prolia:  1. Ordering provider has previously reviewed information in the Medication Guide and Patient Counseling Chart, including the serious risks of Prolia  and the symptoms of each risk and have been advised to seek prompt medical attention if they have signs or symptoms of any of the serious risks.  2. Provided each patient a copy of the Medication Guide and Patient " Brochure.  See MAR for administration details.   Indication: Prolia  (denosumab) is a prescription medicine used to treat osteoporosis in patients who:   Are at high risk for fracture, meaning patients who have had a fracture related to osteoporosis, or who have multiple risk factors for fracture; Cannot use another osteoporosis medicine or other osteoporosis medicines did not work well.   The timeline for early/late injections would be 4 weeks early and any time after the 6 month joe. If a patient receives their injection late, then the subsequent injection would be 6 months from the date that they actually received the injection    Have the screening questions been asked prior to this administration? Yes

## 2021-06-17 NOTE — TELEPHONE ENCOUNTER
Telephone Encounter by Gustavo Gamez RN at 5/18/2021  2:26 PM     Author: Gustavo Gamez RN Service: -- Author Type: Registered Nurse    Filed: 5/18/2021  3:02 PM Encounter Date: 5/18/2021 Status: Signed    : Gustavo Gamez RN (Registered Nurse)       ANTICOAGULATION  MANAGEMENT    Assessment     Today's INR result of 3.4 is Supratherapeutic (goal INR of 2.0-3.0)        Warfarin taken as previously instructed    No new diet changes affecting INR    No new medication/supplements affecting INR    Continues to tolerate warfarin with no reported s/s of bleeding or thromboembolism     Previous INR was Therapeutic    Plan:     Spoke on phone with Valentina and left message for nurse Sarah regarding INR result and instructed:      Warfarin Dosing Instructions:  Change warfarin dose to 5 mg daily on wed; and 10 mg daily rest of week  (7 % change)    Instructed patient to follow up no later than: two weeks    Valentina verbalizes understanding and agrees to warfarin dosing plan.    Instructed to call the [facility] for any changes, questions or concerns. (#[phone])   ?   Gustavo Gamez RN    Subjective/Objective:      Valentina Olmedo, a 61 y.o. female is on warfarin. Valentina Montgomery reports:     Home warfarin dose: as updated on anticoagulation calendar per template     Missed doses: No     Medication changes:  No     S/S of bleeding or thromboembolism:  No     New Injury or illness:  No     Changes in diet or alcohol consumption:  No     Upcoming surgery, procedure or cardioversion:  No    Anticoagulation Episode Summary     Current INR goal:  2.0-3.0   TTR:  40.5 % (1 y)   Next INR check:  6/1/2021   INR from last check:  3.40 (5/18/2021)   Weekly max warfarin dose:     Target end date:     INR check location:     Preferred lab:     Send INR reminders to:  [facility]    Indications    History of pulmonary embolism [Z86.711]           Comments:           Anticoagulation Care Providers     Provider Role Specialty  Phone number    ToribioDonald MD Referring Internal Medicine [phone]    Promise Vanegas MD Responsible Family Medicine [phone]

## 2021-06-18 NOTE — PATIENT INSTRUCTIONS - HE
Patient Instructions by Griselda Garcia PT at 12/8/2020 12:30 PM     Author: Griselda Garcia PT Service: -- Author Type: Physical Therapist    Filed: 12/8/2020  1:12 PM Encounter Date: 12/8/2020 Status: Signed    : Griselda Garcia PT (Physical Therapist)         LAYING DOWN EXERCISES   ABDOMINAL BRACING    While lying on your back, tighten your stomach muscles as you draw your navel down towards the floor and push your back flat.  Can do with knees bent or straight.  Hold x2-3 seconds - do 1-2 breaths inhale and exhale x5-10 reps  1-2x/day      SUPINE HIP ABDUCTION - ELASTIC BAND CLAMS    Lie down on your back with your knees bent. Place an elastic band around your knees and then draw your knees apart.  Can tie band in knot so it has tension around your thighs.  x5-10 reps 1-2x/day   OR SEATED VERSION       HIP ABDUCTION ISOMETRIC WITH BAND - SEATED    While sitting, place band around your knees.  Gently press knees outward against the band and hold.  x5-10 reps 1-2x/day      BALL SQUEEZE - SEATED    While sitting, place a ball or pillow between your knees. Squeeze the ball with your knees and hold.  Hold x1-2 seconds x5-10 reps 1-2x/day        SEATED MARCHING    Lift 1 knee.  Hold x 1 second.  Slowly lower.  Alternate knees.    Perform 3-5 reps right leg and 5-10 reps left  1-2 times per day.       STANDING EXERCISE   SIT TO STAND    While making sure you bend at the hip, SLOWLY sit down    Your chest may come forward over your toes, but your spine should stay in neutral as shown.    ARMS wherever they want as long as they don't help  x5-10 reps  1-2x/day               SINGLE LEG STANCE - SLS    Stand on one leg and maintain your balance.    Hold on to counter with hands for support - try to do 1-2 seconds on R leg - stop when painful.   Try to do 5-10 seconds on left leg - with fingertips only if able - to practice wobbling!  1-2x/day     Neck motion exercises - 1-2x/day   CERVICAL FLEXION    Tilt your head  downwards, then return back to looking straight ahead.  x5-10 reps     CERVICAL SIDE BEND    Tilt your head towards the side, then return back to looking straight ahead.  (Be sure to keep you eyes and nose pointed straight ahead the entire time)  x5-10 reps    CERVICAL ROTATION    Turn your head towards the side, then return back to looking straight ahead.  x5-10 reps       Can try to prop legs up while laying down for pressure off of your back.

## 2021-06-18 NOTE — PATIENT INSTRUCTIONS - HE
Patient Instructions by Griselda Garcia PT at 12/29/2020  1:00 PM     Author: Griselda Garcia PT Service: -- Author Type: Physical Therapist    Filed: 12/29/2020  2:05 PM Encounter Date: 12/29/2020 Status: Signed    : Griselda Garcia PT (Physical Therapist)       Epley Maneuver - can look up on google to try to help with dizziness      LAYING DOWN EXERCISES   ABDOMINAL BRACING    While lying on your back, tighten your stomach muscles as you draw your navel down towards the floor and push your back flat.  Can do with knees bent or straight.  Hold x2-3 seconds - do 1-2 breaths inhale and exhale x5-10 reps  1-2x/day      SUPINE HIP ABDUCTION - ELASTIC BAND CLAMS    Lie down on your back with your knees bent. Place an elastic band around your knees and then draw your knees apart.  Can tie band in knot so it has tension around your thighs.  x5-10 reps 1-2x/day   OR SEATED VERSION       HIP ABDUCTION ISOMETRIC WITH BAND - SEATED    While sitting, place band around your knees.  Gently press knees outward against the band and hold.  x5-10 reps 1-2x/day      BALL SQUEEZE - SEATED    While sitting, place a ball or pillow between your knees. Squeeze the ball with your knees and hold.  Hold x1-2 seconds x5-10 reps 1-2x/day        SEATED MARCHING    Lift 1 knee.  Hold x 1 second.  Slowly lower.  Alternate knees.    Perform 3-5 reps right leg and 5-10 reps left  1-2 times per day.       STANDING EXERCISE   SIT TO STAND    While making sure you bend at the hip, SLOWLY sit down    Your chest may come forward over your toes, but your spine should stay in neutral as shown.    ARMS wherever they want as long as they don't help  x5-10 reps  1-2x/day               SINGLE LEG STANCE - SLS    Stand on one leg and maintain your balance.    Hold on to counter with hands for support - try to do 1-2 seconds on R leg - stop when painful.   Try to do 5-10 seconds on left leg - with fingertips only if able - to practice wobbling!  1-2x/day      Neck motion exercises - 1-2x/day   CERVICAL FLEXION    Tilt your head downwards, then return back to looking straight ahead.  x5-10 reps     CERVICAL SIDE BEND    Tilt your head towards the side, then return back to looking straight ahead.  (Be sure to keep you eyes and nose pointed straight ahead the entire time)  x5-10 reps    CERVICAL ROTATION    Turn your head towards the side, then return back to looking straight ahead.  x5-10 reps       Can try to prop legs up while laying down for pressure off of your back.

## 2021-06-18 NOTE — PATIENT INSTRUCTIONS - HE
Patient Instructions by Griselda Garcia PT at 11/17/2020 12:30 PM     Author: Griselda Garcia PT Service: -- Author Type: Physical Therapist    Filed: 11/17/2020  1:16 PM Encounter Date: 11/17/2020 Status: Signed    : Griselda Garcia PT (Physical Therapist)         LAYING DOWN EXERCISES   ABDOMINAL BRACING    While lying on your back, tighten your stomach muscles as you draw your navel down towards the floor and push your back flat.  Can do with knees bent or straight.  Hold x2-3 seconds - do 1-2 breaths inhale and exhale x5-10 reps  1-2x/day      SUPINE HIP ABDUCTION - ELASTIC BAND CLAMS    Lie down on your back with your knees bent. Place an elastic band around your knees and then draw your knees apart.  Can tie band in knot so it has tension around your thighs.  x5-10 reps 1-2x/day   OR SEATED VERSION       HIP ABDUCTION ISOMETRIC WITH BAND - SEATED    While sitting, place band around your knees.  Gently press knees outward against the band and hold.  x5-10 reps 1-2x/day      BALL SQUEEZE - SEATED    While sitting, place a ball or pillow between your knees. Squeeze the ball with your knees and hold.  Hold x1-2 seconds x5-10 reps 1-2x/day       STANDING EXERCISE   SIT TO STAND    While making sure you bend at the hip, SLOWLY sit down    Your chest may come forward over your toes, but your spine should stay in neutral as shown.    ARMS wherever they want as long as they don't help  x5-10 reps  1-2x/day       Try walking in the hallway with someone following with the wheelchair - use the walker for 1-2 minutes 2-3x/day for practice with walking    Try scooting yourself around with your heels pulling on the ground or heels pushing you backwards - try in hallway 1-2x/day

## 2021-06-18 NOTE — PATIENT INSTRUCTIONS - HE
Patient Instructions by Griselda Garcia PT at 10/22/2020 12:30 PM     Author: Griselda Garcia PT Service: -- Author Type: Physical Therapist    Filed: 10/22/2020  1:56 PM Encounter Date: 10/22/2020 Status: Signed    : Griselda Garcia PT (Physical Therapist)        SIT TO STAND    While making sure you bend at the hip, SLOWLY sit down    Your chest may come forward over your toes, but your spine should stay in neutral as shown.    ARMS wherever they want as long as they don't help  x5-10 reps  1-2x/day       Try walking around the apartment with walker for 1-2 minutes 2-3x/day for practice with walking    Try scooting yourself around with your heels pulling on the ground or heels pushing you backwards - try in hallway 1-2x/day

## 2021-06-18 NOTE — PROGRESS NOTES
"Date of Service:  2018    Name:  Valentina Olmedo  :  1960  MRN:  [id number]    HPI:   Valentina Olmedo is a 58 y.o. female with neurocognitive deficits (neuropsych testing done 3/20/18 at [facility], found under media tab),  seizure disorder, CHRIS,  borderline personality disorder, PTSD, hypothyroid, major depressive disorder.  She is accompanied by Jose F Co CM, \"Patty\".    Last seen in 2018. Since that visit, Risperdal and Seroquel dose reduced due to concerns of sedation and falls.    Reviewed notes by PCP Dr Rooney on 18.    ROS: Today she reports anxiety and depression are well controlled. Sleep is good.   C/o problems with word finding and talking.  The  who worked w/pt from -2017 also noted increased problems in these areas.    --------------------------------------------------------------------------------------------------------  Neurocognitive deficits-I reviewed test results below with pt and CM.    Neuropsych testing by Irais De La Cruz PhD from [facility] (3/20/18).  Note excerpt:  \"Implications/recommendations:  \"Her current test performance demonstrates significant decline relative to last evaluation in .    The possibility of significant medication side effects secondary to her anti-epileptic and psychotropic drug regime should be explored, as well possible onset of dementia.    In addition, the patient's home situation should be evaluated by a county worker if possible as patient is believed to be vulnerable.    Follow-up by neuropsychology should be considered if a medication adjustment results in some improvement in her presentation\"  ---------------------------------------------------------------------------------------------------------------------------    Therapist- she has good alliance with Loretta TAMAYO.    Social  Lives in 1  apartment in [place] on the . Uses elevator.  They have 3 cats-Yulisa Camarillo Parker.  " "  OLEGARIO-Patty Ellis [phone]  Weekly Med set- up:  [phone] (Hamida)    Medical  Hx CHRIS- Dx'd at [facility] by Benjie Allan MD 12/10/2013, \"AutoCPAP 5-20 cms of water is recommended, while patient sleeps\".    Sleep study done at [facility] 4/2015- \"Pt did not have any significant sleep apnea during her test\", 4/6/15 Fernando Montaño MD     [facility] April 2017, started on warfarin  Neurologist Praveena Benitez, [facility]          Medications:     Cymbalta 120 mg daily  Trazodone 200 mg daily at bedtime  Seroquel 200mg q hs  Seroquel 50 mg q am  Risperdal 2 mg daily at bedtime  Benadryl 50 mg bid prn anxiety    Cogentin 1 mg/ day  Synthroid 175  g daily  Vit D3 2,000 Units daily    For seizure disorder:   Tegretol 400 mg a.m. 300 mg twice a day  Ethotoin 1000 mg 3 times a day    Coumadin and other meds in [facility]       Associated Clinical Documents:       Notes reviewed in [facility] [place] including: medication reconciliation, nurses's notes, progress notes, recent labs, PMH, and OSH records.    ROS:       10 point ROS was negative except for the items listed in HPI.  More alert and less anxiety, no psychotic symptoms      MSE:      Vital signs: refer to nursing notes from today.   Alert & oriented x 3.   Appearance: Appears stated age, casually dressed.  Speech: Slow rate, rhythm and tone.  Gait: Normal.  Musculoskeletal: Psychomotor slowing, no abnormal movements.  Mood/Affect: Flat/blunted  Thought Process: slow, circumferential  Thought Content: Occ passive SI, no plan. No homicide ideation.  Associations: Intact, no delusions.  Perceptions: See HPI  Memory: recent and remote memory intact.  Attention span and concentration: Fair  Language: Intact.  Fund of Knowledge: Normal.  Insight and Judgement: Fair    Impression:        1. Neurocognitive deficits-  I reviewed test results below with pt and CM.  Neuropsych testing by Irais De La Cruz PhD from [facility] (3/20/18).  Note " "excerpt:  \"Implications/recommendations:  \"Her current test performance demonstrates significant decline relative to last evaluation in 2012.    The possibility of significant medication side effects secondary to her anti-epileptic and psychotropic drug regime should be explored, as well possible onset of dementia.    In addition, the patient's home situation should be evaluated by a county worker if possible as patient is believed to be vulnerable.    Follow-up by neuropsychology should be considered if a medication adjustment results in some improvement in her presentation\"      2. 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    Note by: Sudha Reece, PhD [facility], 10/28/2015  7:33 PM   [facility]    ---------------------------------------------------------------------------------------------------------------------------    2. major depressive disorder, recurrent, currently in remission.  Could benefit from more socialization.    3. Home onur nurse sets up meds 2x/month    Plan:       1.  Had discussion regarding decreasing med doses to help with cognitition. She agreed to:  Decrease trazodone 150mg q hs. risperdal 1 mg q hs. F/u 2 mos.  2.  Recommend:  -Adult Day Program or Group at "[facility]  -'s evaluation (info given)  -Home safety eval by home care nurse    Pt considering psychiatrist closer to home    3. Cont therapy.        Total Time:      25 minutes      Coordination of Care:       25 Minutes spent on this visit with >50% time spent on coordination of care with staff, educating patient and family about diagnosis, treatment options, risks, benefits and side effects of medications, and providing supportive therapy.  Time also spent on reviewing  EHR, documentation and entering orders.      This dictation was completed with speech recognition software and there may be unintended word substitutions.    -----------------------------------  "

## 2021-06-18 NOTE — PROGRESS NOTES
All orders except the Quetiapine 200 mg at [facility] are to be discontinued.  They said that the 200 mg was dispensed on 05/01/2018.  Call placed to Valentina to let her know there is a medication change and she can call us to get the information, or Hamida, her nurse would have that information, also.  Phone message left for Hamida that the only order should be for the 200 mg and all other are discontinued, and the order and the discontinued orders and Dr. Puente's note, all faxed to Hamida.

## 2021-06-18 NOTE — PROGRESS NOTES
Attempt 1-Care Guide called patient.  If this patient is returning our call please transfer to Dionne at ext. [phone]

## 2021-06-18 NOTE — PROGRESS NOTES
"Mental Health Visit Note    6/21/2018    Start time: 1:00pm    Stop Time: 1:55pm  Session # 8    Valentina Olmedo is a 58 y.o. female is being seen today for    Chief Complaint   Patient presents with      Follow Up     Depression     Anxiety   .     New symptoms or complaints: None    Functional Impairment:   Personal: 3  Family: 2  Work: 4  Social: 4    Clinical assessment of mental status: (Same as last session 6/7/2018) Patient's grooming is Well groomed, his attire is Appropriate, and his age appears Appears Stated. Behavior towards examiner is Cooperative, motor activity is Tremors/Tics (shaking leg), and eye contact is Staring.  Patient's mood is Depressed, and affect is Blunted and Flat.  Speech/language is Delayed/Hesitant, attention is Distractible, and concentration is Brief. Thought process is Slow, thought content is Within noraml and  Within normal.  Patient's orientation is X 3, memory is  Impairment and Recent, judgement is Impairment and Minimal, and estimated intelligence is Below Average. Demonstrated insight is Adequate while fund of knowledge is adequate.    Suicidal/Homicidal Ideation present: None reported.    Patient's impression of their current status:    Patient reports mood as somewhat anxious, somewhat depressed but pretty much at baseline, \"not that bad\".   Processes her concern that with spouse they haven't cleaned \"since we moved in\" (7 months!).  Denies that there are health hazards such as rotting food or clutter that causes health hazard. Her spouse does the dishes and cleans the cat litter.     Minimal efforts at therapy homework:  States she did try to walk, got outside the building to the parking lot and back but was not able to walk around the building.    She made us of identified cheaper stores for groceries, make use a food shelf, reminded her spouse to pay the bills in an effort to alleviate financial stressors.  Taped \"50 ways to take a break\" in her bathroom as well as " "her refrigerator in an effort to remember to use tools for frustration and emotional management \"but I still didn't use it\".   Some efforts \"less than half the time\" to try to get up daily, get dressed, be with her spouse in living room and gentle walking out in the community.    Therapist impression of patients current state:  Patient pretty much at baseline, MH reasonably stable, but lack of activity and self care in the home is a concern. Appears as motivational as mood related.  No concern about health hazard but housing could be put at risk. Writer has made many attempts to get patient moving in or outside of the home with minimal success.      Processed negative cognitions, reinforced strengths, validated patient efforts and feelings.  Discussed that even if she can't make it around the apartment building, she can walk on one or two sides to build up her abiilties.    Assisted patient to call her  Patty Caleb ([phone]) to share concern that apartment has not been cleaned.  Patty states that she visits 1x/month, and while the unit can be messy she has not observed health hazards.  States vacuum has on occasion been sitting out so that she's under the impression that there has been minimal cleaning. States that without MA patient not eligible for [facility] worker.  States that patient denies spend down for MA where she could get a CADI waiver and more services in the home.  Discussed that a building with meals might be a good match for this patient in the future.    Type of psychotherapeutic technique provided: Client centered, Solution-focused and CBT    Progress toward short term goals: Mixed progress: States she did try to walk, got outside the building to the parking lot and back but was not able to walk around the building.    She made us of identified cheaper stores for groceries, make use a food shelf, reminded her spouse to pay the bills in an effort to alleviate financial stressors.  Taped " "\"50 ways to take a break\" in her bathroom as well as her refrigerator in an effort to remember to use tools for frustration and emotional management \"but I still didn't use it\".   Some efforts \"less than half the time\" to try to get up daily, get dressed, be with her spouse in living room and gentle walking out in the community.    Review of long term goals: Not done at today's visit and Date of last review 4/26/18    Diagnosis:   1. Depression, major, recurrent, moderate (H)    2. Generalized anxiety disorder    3. Borderline personality disorder    4. Cognitive deficits        Plan and Follow up: Patient will return for follow up in two weeks. She will continue to utilize cheaper stores for groceries, make use a food shelf, remind her spouse to pay the bills in an effort to alleviate financial and thus MH stressors.  She will look at \"50 ways to take a break\" in her bathroom as well as her refrigerator in an effort to remember to use tools for frustration and emotional management.  She will continue to try to get up daily, get dressed, be with her spouse in living room and gentle walking out in the community.    Discharge Criteria/Planning: Client has chronic symptoms and ongoing therapy for maintenance stability recommended.    Loretta Barney MSW, LICSW  6/21/2018  "

## 2021-06-18 NOTE — PATIENT INSTRUCTIONS - HE
Patient Instructions by Griselda Garcia PT at 1/19/2021  1:00 PM     Author: Griselda Garcia PT Service: -- Author Type: Physical Therapist    Filed: 1/19/2021  1:44 PM Encounter Date: 1/19/2021 Status: Signed    : Griselda Garcia PT (Physical Therapist)         LAYING DOWN EXERCISES   ABDOMINAL BRACING    While lying on your back, tighten your stomach muscles as you draw your navel down towards the floor and push your back flat.  Can do with knees bent or straight.  Hold x2-3 seconds - do 1-2 breaths inhale and exhale x5-10 reps  1-2x/day      SUPINE HIP ABDUCTION - ELASTIC BAND CLAMS    Lie down on your back with your knees bent. Place an elastic band around your knees and then draw your knees apart.  Can tie band in knot so it has tension around your thighs.  x5-10 reps 1-2x/day   OR SEATED VERSION       HIP ABDUCTION ISOMETRIC WITH BAND - SEATED    While sitting, place band around your knees.  Gently press knees outward against the band and hold.  x5-10 reps 1-2x/day    KEEP MOTION SMALL SO COMFORTABLE ON YOUR BACK      BALL SQUEEZE - SEATED    While sitting, place a ball or pillow between your knees. Squeeze the ball with your knees and hold.  Hold x1-2 seconds x5-10 reps 1-2x/day        SEATED MARCHING    Lift 1 knee.  Hold x 1 second.  Slowly lower.  Alternate knees.    Perform 3-5 reps right leg and 5-10 reps left  1-2 times per day.      LONG ARC QUAD - LAQ - HIGH SEAT    While seated with your knee in a bent position, slowly straighten your knee as you raise your foot upwards as shown.        STANDING EXERCISE   SIT TO STAND    While making sure you bend at the hip, SLOWLY sit down    Your chest may come forward over your toes, but your spine should stay in neutral as shown.    ARMS wherever they want as long as they don't help  x5-10 reps  1-2x/day           STANDING HEEL RAISES    While standing, raise up on your toes as you lift your heels off the ground.  x10-20 reps 1-2x/day               SINGLE  LEG STANCE - SLS    Stand on one leg and maintain your balance.    Hold on to counter with hands for support - try to do 1-2 seconds on R leg - stop when painful.   Try to do 5-10 seconds on left leg - with fingertips only if able - to practice wobbling!  1-2x/day     Neck motion exercises - 1-2x/day   CERVICAL FLEXION    Tilt your head downwards, then return back to looking straight ahead.  x5-10 reps     CERVICAL SIDE BEND    Tilt your head towards the side, then return back to looking straight ahead.  (Be sure to keep you eyes and nose pointed straight ahead the entire time)  x5-10 reps    CERVICAL ROTATION    Turn your head towards the side, then return back to looking straight ahead.  x5-10 reps       Can try to prop legs up while laying down for pressure off of your back.

## 2021-06-18 NOTE — LETTER
Letter by Donald Rooney MD at      Author: Donald Rooney MD Service: -- Author Type: --    Filed:  Encounter Date: 2/7/2019 Status: (Other)       Valentina Olmedo  [id number]  [place] [facility] [place] [address]             February 7, 2019         Dear Ms. Olmedo,    Below are the results from your recent visit:    Resulted Orders   Vitamin B12   Result Value Ref Range    Vitamin B-12 528 213 - 816 pg/mL   Thyroid Stimulating Hormone (TSH)   Result Value Ref Range    TSH 4.17 0.30 - 5.00 uIU/mL       Labs generally look okay, excellent    Please call with questions or contact us using [facility]aw[facility].    Sincerely,        Electronically signed by Donald Rooney MD

## 2021-06-18 NOTE — PROGRESS NOTES
"Mental Health Visit Note    5/24/2018    Start time: 1:10pm    Stop Time: 2pm (50 minutes) Session # 6    Valentina Olmedo is a 58 y.o. female is being seen today for    Chief Complaint   Patient presents with      Follow Up     Anxiety     anxiety about finances     Depression     does not identify triggers   .     New symptoms or complaints: None    Functional Impairment:   Personal: 3  Family: 2  Work: 4  Social: 4    Clinical assessment of mental status: Patient's grooming is Well groomed, his attire is Appropriate, and his age appears Appears Stated. Behavior towards examiner is Cooperative, motor activity is Retarded, and eye contact is Staring.  Patient's mood is Irritable, and affect is Blunted and Irritable.  Speech/language is Delayed/Hesitant, attention is Distractible, and concentration is Brief. Thought process is Slow, thought content is Within noraml and  Within normal.  Patient's orientation is X 3, memory is  Impairment and Recent, judgement is Impairment and Minimal, and estimated intelligence is Below Average. Demonstrated insight is Adequate while fund of knowledge is adequate.    Suicidal/Homicidal Ideation present: Yes: Patient initially denies, then admits suicidal ideation only when she completes assessment scales for treatment plan.  She states \"I would never do it though.  I'm not doing it again.\"  She agrees to saftey plan to call crisis or come to ER if she has impulses to harm herself.    Patient's impression of their current status:    Patient brings spouse to session for collateral. Patient identifies mood as \"depressed\".   Patient describes being \"unhappy \" in her living situation.  She is initially unable to identify reasons she is unhappy \"I just don't like it\".      States she did nothing to follow up on therapy homework. She is getting up and dressed \"only sometimes\", but will go out with spouse to stores occasionally.    Therapist impression of patients current state: Patient's " "mood remains sub-par baseline: depressed, irritable.  She does not appear to have capacity and/or willingness to try out alternative coping skills at home, writer believes slowed cognitions get in the way of organizing this were she to even be willing (which is also suspect).    Processed negative cognitions, reinforced strengths, validated patient efforts and feelings.  Discussed housing concerns. With discussion she is able to agree with spouse's comments: would like a garage available, would be interested in having meals/increased services available. Discussed options to explore same: patient agrees she can talk to her  CM.  We discussed attendance issues, multiple no-shows. Problem solved, only option patient able to identify is \"getting financial help from worker\".  With coaching, identified options to use handicapped tag to park on street, or couple to put away $ monthly into a money envelope.  Patient's spouse says he has significant difficult with parallel parking.   Placed call to [employer] worker Patty Ellis [phone] to inquire if patient's CADI waiver is still active and if that might be used for parking, left message.    Type of psychotherapeutic technique provided: Client centered, Solution-focused and CBT    Progress toward short term goals: Poor progress: minimal efforts to motivate/activate. Attendance at therapy is poor, patient states this is because they don't have $ for parking.    Review of long term goals: Long-term goals reviewed   and Treatment Plan updated    Diagnosis:   1. Generalized anxiety disorder    2. Depression, major, recurrent, moderate    3. Borderline personality disorder    4. Cognitive deficits        Plan and Follow up: Patient will return for follow up in two weeks. She will put away parking $ from their second check for appt parking each month.  Will get out \"Fifty Ways to Take a Break\" for frustration/emotional management.  She agrees to try to get up daily, get " dressed, be with spouse in living room, activate by going out to stores and doing some walking.    Discharge Criteria/Planning: Client has chronic symptoms and ongoing therapy for maintenance stability recommended.    Loretta BRINK, LICSW  5/24/2018

## 2021-06-18 NOTE — PROGRESS NOTES
Assessment/Plan:        Patient wanting to have [facility] since  has services at another clinic.  Patient attends outpatient mental health services, has [facility] RN in home for medication set up. Patient unable to state any of her medications, and does not know their actions. States she is compliant with taking meds from med box after are set up. Is on warfarin and goes in to clinic for INR checks.  Discussed signs to watch for when being on coumadin, patient verbalized understanding. Patient and  live in 4th floor apartment building in [place]. Difficutly affording groceries and would like help with quitting smoking. States has tried other things in the past but they have not worked.  She stops but starts again.  State does not smoke very much.  quite smoking a few years ago.          Subjective:    Patient ID: Valentina Olmedo is a 58 y.o. female.    HPI    The following portions of the patient's history were reviewed and updated as appropriate: allergies, current medications, past medical history and problem list.    Review of Systems          Objective:    Physical Exam     RN Recommendations and Referrals  Financial resource guide consult/follow-up: for assitance with groceries and low cost options.   CCC :  as needed.     Action Plan    RN Will  Schedule [facility]  consult  Will not add the patient to CCC tracking list  Be available to the patient as nursing needs arise    Care Guide Will  Review goals set at PCAM visit and create or add to action plan.  Reach out to [facility] SW for appointment with SW or financial resources guide as needed.      Goals  Goals        Patient Stated      I would like assistance with organizing my many medical appointmnets. (pt-stated)            Action steps to achieve this goal  1.  I will speak to care guide when called to discuss ways for managing my appointments.  2.  I will have my appointment calendar ready and share all the  "upcoming appointments with my care guide.      Date goal set:  6/25/18        I would like assitance with quitting smoking. (pt-stated)            Action steps to achieve this goal  1.  I will speak with care guide when called to discuss smoking cessation plan.       Date goal set:  6/25/18        I would like to find ways to afford groceries and any low cost options. (pt-stated)            Action steps to achieve this goal  1.  I will share my finances and needs with my care guide or  when they call.   2.  I can drive, so will be open to hearing of free [employer] or other options for food or reduced fee options.     Date goal set:  6/25/18              Clinic Care Coordination RN Assessed Needs  Patient Centered Assessment Method-PCAM TOTAL SCORE: 30 (6/25/2018  2:50 PM)  Level 2:  A score of 25-36 indicates that the patient has a moderate initial need for RN or SW intervention at the discretion of the .  The RN will add this patient to her panel and follow closely in partnership with the care guide until stable.  She will reach out to the care guide for support in care coordination needs and graduate the patient to standard care guide outreach when appropriate.      PCAM (Patient Centered Assessment Method)   HEALTH AND WELL-BEING  Other Physical Health Concerns:: Obesity, back pain, leg pain, weakness  RN Assessment: Physical Health Needs: Mod to severe symptoms or problems that impact on daily life  RN Assessment: Physical Health Problems: Mild impact on mental well-being e.g. 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  Walks with no assistive devices, but does have issues walking due to dizziness and pain.   RN Assessment: Home Environment: Safe, stable, but with some inconsistency  Daily Activities Concerns:  (Difficulty walking long distances due to pain in legs.  Does not have any edema in legs. )  Other Daily Activities Concerns:: Has supportive  - present at this visit.   RN Assessment: Daily Activites: Adequate participation with social networks  Social Network Concerns: Denies concerns that require further investigation  RN Assessment: Social Network: Adequate participation with social networks  Financial Status and Service Concerns: Disabled, Unemployed  RN Assessment: Financial Resources: Financially insecure, very few resources, immediate challenges  HEALTH LITERACY AND COMMUNICATION  Understanding of Health and Wellbeing Concerns: Little understanding which impacts on their ability to undertake better management  RN Assessment: Health Literacy: Little understanding which impacts on their ability to undertake better management  Engagement Concerns: Serious difficulties in communication, with severe barriers, Some difficulties in communication with or without moderate barriers  RN Assessment: Engagement: Adequate communication, with or without minor barriers  Barriers to Compliance with Medical Recommendations: Language, Literacy, Financial, Mental Illness  SERVICE COORDINATION  Other Services: Other care/services in place and adequate  Coordination of Services: Required care/services in place and adequately coordinated (Has home care involved and comes in to set up meds weekly.  )  PCAM TOTAL SCORE: 30      Emergency Plan  Emergency Plan Recommendation:    When to Use the Emergency Department (ED)  An emergency means you could  die if you don t get care quickly. Or you could be hurt permanently (disabled). Read below to know when to use -- and when not to use -- an emergency department (also called ED).    Dangers to your life  Here are examples of emergencies. These need immediate care:  A hard time breathing  Severe chest pain  Choking  Severe bleeding  Suddenly not able to move or speak  Blacking out (fainting)`  Poisoning    Dangers of permanent injuries  Here are other emergencies. These also need immediate care:  Deep cuts or severe burns  Broken bones, or sudden severe pain and swelling in a joint    When it s an emergency  If you have an emergency, follow these steps:    1. Go to the nearest emergency department  If you can, go to the hospital ED closest to you right away.  If you cannot get there right away, or if it is not safe to take yourself, call 911 or your police emergency number.  2. Call your primary care doctor  Tell your doctor about the emergency. Call within 24 hours of going to the ED.  If you cannot call, have someone call for you.  Go to your doctor (not the ED) for any follow-up care.    When it s not an emergency  If a problem is not an emergency, follow these steps:    1. Call your primary care doctor  If you don t know the name of your doctor, call your health plan.  If you cannot call, have someone call for you.  2. Follow instructions  Your doctor will tell you what you should do.  You may be told to see your doctor right away. You may be told to go to the ED. Or you may be told to go to an urgent care center.  Follow your doctor s advice.  Knowing When to Seek Treatment for Mental Health Concerns    What are the symptoms of a potential problem in an adult?  The following are the most common symptoms of a potential emotional, behavioral, and/or developmental problem in an adult, which necessitates a psychiatric evaluation. However, each individual may experience symptoms differently. Symptoms may  "include:  Significant decline in work performance, poor work attendance, and/or lack of productivity  Social withdrawal from activities, friends, and/or family  Substance (alcohol and drugs) abuse  Sleep disturbances (like persistent nightmares, insomnia, hypersomnia, or flashbacks)  Depression (poor mood, negativity, or mood swings)  Appetite changes (like significant weight gain or loss)  Continuous or frequent aggression  Continuous or frequent anger (for periods longer than 6 months)  Excessive worry and/or anxiety  Threats to self or others  Thoughts of death  Thoughts and/or talk of suicide  Destructive behaviors (like criminal activity, or stealing)  Sexually \"acting out\"  Lying and/or cheating  Many physical complaints, including being constantly tense and/or frequent aches and pains that cannot be traced to a physical cause or injury  Sudden feelings of panic, dizziness, or increased heartbeat  Increased feelings of guilt, helplessness, and/or hopelessness  Decreased energy  The symptoms of a potential emotional, behavioral, and/or developmental problem may look like other conditions. Always talk with your child's health care provider for a diagnosis.    [facility] Mental Health Crisis Lines:  [facility] [phone]  [facility] [phone]  [facility] [phone]  [facility] [phone]  [facility], Adults [phone]  [facility], Children [phone]  [facility], Adults [phone]  [facility], Children [phone]  Epilepsy: Safety during a Seizure      Let family and friends know what to expect and how to react when you have a seizure. This helps keep them calm and you safe. All seizures should be treated with care, but tonic-clonic seizures (seizures during which you lose consciousness) require more attention. Here are some pointers for loved ones.  What to Know    Seizures typically last less than 3 minutes, but it will feel like it is longer. People " recover safely from most seizures. During a tonic-clonic seizure, the person may appear to stop breathing or turn slightly blue. This may be scary for you, but try to stay calm. Afterward, the person may be tired, confused, and achy. He or she may need to sleep for several hours to fully recover.  What to Do    During any seizure, stay with the person until it is over. Note the time when the seizure starts and ends. Don t try to stop the seizure. During a tonic-clonic seizure, also do the following:    Move hard or sharp objects out of the way.    Lay the person on a flat surface and turn them on their side.    Place a flat, soft object under their head.    Don t try to restrain the person. Both of you could get hurt.    Don t put anything in the person s mouth. They cannot swallow their tongue, and you risk breaking their teeth or being bitten.    Don t give the person medications during a seizure, unless you ve been trained by a health care provider.    Speak quietly to the person as they recover.    Call 911 if the seizure lasts longer than 5 minutes, there is no conscious interval between 2 seizures, or several seizures occur in a row. These events could represent status epilepticus, a medical emergency. Also call 911 if the seizure is very different from past seizures, a first time seizure, the patient does not 'wake up' or regain consciousness, or if the person is pregnant or has diabetes.  Preventing Falls    Having a health problem can make you more likely to fall. Taking certain kinds of medicines may also increase your risk of falls. So, improving your health can help you avoid a fall. Work with your healthcare provider to manage health problems and to review your medicines. If you have your health under control, your risk of falling is lessened.    When to call your healthcare provider  Be sure to call your healthcare provider if you fall. Also call if you have any of these signs or symptoms (someone else  may need to point them out to you):    Feeling lightheaded or dizzy more than once a day    Losing your balance often or feeling unsteady on your feet    Feeling numbness in your legs or feet, or noticing a change in the way you walk    Having a steady decline in your memory or mental sharpness

## 2021-06-18 NOTE — PATIENT INSTRUCTIONS - HE
Patient Instructions by Griselda Garcia PT at 4/1/2021  3:30 PM     Author: Griselda Garcia PT Service: -- Author Type: Physical Therapist    Filed: 4/1/2021  4:37 PM Encounter Date: 4/1/2021 Status: Signed    : Griselda Garcia PT (Physical Therapist)       Oxygen sats today 4/1 at 96% and heart rate after strength testing and sit to stand exercise was at 78-90 bpm         SEATED MARCHING    Lift 1 knee.  Hold x 1 second.  Slowly lower.  Alternate knees.    Perform 3-5 reps right leg and 5-10 reps left  1-2 times per day.      LONG ARC QUAD - LAQ - HIGH SEAT    While seated with your knee in a bent position, slowly straighten your knee as you raise your foot upwards as shown.        STANDING EXERCISE   SIT TO STAND    While making sure you bend at the hip, SLOWLY sit down    Your chest may come forward over your toes, but your spine should stay in neutral as shown.    ARMS wherever they want as long as they don't help  x5-10 reps  1-2x/day  DO WITH CHAIR BEHIND AND IN FRONT OF COUNTER         Neck motion exercises - 1-2x/day   CERVICAL FLEXION    Tilt your head downwards, then return back to looking straight ahead.  x5-10 reps     CERVICAL SIDE BEND    Tilt your head towards the side, then return back to looking straight ahead.  (Be sure to keep you eyes and nose pointed straight ahead the entire time)  x5-10 reps    CERVICAL ROTATION    Turn your head towards the side, then return back to looking straight ahead.  x5-10 reps

## 2021-06-18 NOTE — PROGRESS NOTES
I never ordered 50mg XR.    On 5/1/18, I ordered seroquel 200mg q hs.  This is the only seroquel pt should be on.    Plan-  RN to fax med list to [phone]

## 2021-06-18 NOTE — PROGRESS NOTES
"Mental Health Visit Note    6/7/2018    Start time: 1:00pm    Stop Time: 1:55pm  Session # 7    Valentina Olmedo is a 58 y.o. female is being seen today for    Chief Complaint   Patient presents with      Follow Up     Depression     Anxiety   .     New symptoms or complaints: None    Functional Impairment:   Personal: 3  Family: 2  Work: 4  Social: 4    Clinical assessment of mental status: (similar to 5/24) Patient's grooming is Well groomed, his attire is Appropriate, and his age appears Appears Stated. Behavior towards examiner is Cooperative, motor activity is Tremors/Tics (shaking leg), and eye contact is Staring.  Patient's mood is Depressed, and affect is Blunted and Flat.  Speech/language is Delayed/Hesitant, attention is Distractible, and concentration is Brief. Thought process is Slow, thought content is Within noraml and  Within normal.  Patient's orientation is X 3, memory is  Impairment and Recent, judgement is Impairment and Minimal, and estimated intelligence is Below Average. Demonstrated insight is Adequate while fund of knowledge is adequate.    Suicidal/Homicidal Ideation present: None reported.    Patient's impression of their current status:      Patient reports mood as \"tired\". She's not sure if that's associated with depression.   Processes financial stress, apparently caused by car payment and rent having to be paid at the same time. This has left them without enough $ for food. Processes worry about spouse, who has lost weight.     Mixed follow up with therapy homework: She put away parking $ as agreed for parking. Followed through with using handicapped tag to park the car for free. Put \"Fifty Ways to Take a Break\" on the frig for frustration/emotional management, but does not remember using any of it.  Did not do any walking.  Did not get out of the house except to accompany her  to his physical therapy.     Therapist impression of patients current state: Patient appears less " "depressed and irritable but with notable word finding and suspected neurocognitive deficits.    Processed negative cognitions, reinforce strengths, validated patient efforts and feelings. Offered empathic listening and reflections and questions intended to evoke change talk, explored needs and resources, and provided emotional support.   We brainstormed options for patient to be a part of the problem-solving around financial stress.  Patient identifies that she can't be the caller about bills because she struggles for work, but she can help remind her spouse to make the calls.  She can do this without criticism. Patient identifies that they can go to a food shelf to hold them over until next check comes. Identifies cheaper stores.     Type of psychotherapeutic technique provided: Client centered, Solution-focused and CBT    Progress toward short term goals: Mixed progress: She put away parking $ as agreed for parking. Followed through with using handicapped tag to park the car for free. Put \"Fifty Ways to Take a Break\" on the frig for frustration/emotional management, but does not remember using any of it.  Did not do any walking.  Did not get out of the house except to accompany her  to his physical therapy.     Review of long term goals: Not done at today's visit and Date of last review 4/26/18    Diagnosis:   1. Depression, major, recurrent, moderate    2. Generalized anxiety disorder    3. Borderline personality disorder    4. Cognitive deficits        Plan and Follow up: Patient will return for follow up in two weeks.  We will use identified cheaper stores for groceries, make use a food shelf, remind her spouse to pay the bills in an effort to alleviate financial stressors.  She will tape \"50 ways to take a break\" in her bathroom as well as her refrigerator in an effort to remember to use tools for frustration and emotional management.  We will continue to try to get up daily, get dressed, be with her " spouse in living room and gentle walking out in the community.    Discharge Criteria/Planning: Client has chronic symptoms and ongoing therapy for maintenance stability recommended.    Loretta Barney MSW, LICSW  6/7/2018

## 2021-06-18 NOTE — PROGRESS NOTES
Pt here for follow up and medication management     Will need to contact medication nurse for current list.   Hamida WHITAKER From [facility] faxed over current med list. Updated medications in chart.  Faxed today's AVS to Hamida WHITAKER with today's changes of 2 medications. Also asked Hamida to send current med list with patient for next visit in August.     Depression: 1/5   Anxiety: 1/5  1 panic attack with the last 2 weeks,, unable to recall how long this lasted.    Admits to spending several days in bed watching TV.       SI/HI denies thoughts at this time.     Correct pharmacy verified with patient and confirmed in snapshot? [x] yes []no    Charge captured ? [x] yes  [] no    Medications Phoned  to Pharmacy [] yes [x]no  Name of Pharmacist:  List Medications, including dose, quantity and instructions      Medication Prescriptions given to patient   [] yes  [x] no   List the name of the drug the prescription was written for.       Medications ordered this visit were e-scribed.  Verified by order class [x] yes  [] no   Risperdal 1 mg; Trazodone 150 mg   Medication changes or discontinuations were communicated to patient's pharmacy: [x] yes  [] no   Risperdal 2 mg; Trazodone 100 mg   UA collected [] yes  [x] no    [place] Prescription Monitoring Program Reviewed? [] yes  [x] no    Referrals were made to:   None    Future appointment was made: [x] yes  [] no   08/07/18  Dictation completed at time of chart check: [] yes  [x] no    I have checked the documentation for today s encounters and the above information has been reviewed and completed.

## 2021-06-18 NOTE — PROGRESS NOTES
"Neuropsych report reviewed by Irais De La Cruz PhD from [facility] 3/20/18.  Note excerpt:    Implications/recommendations:  \"Her current test performance demonstrates significant decline relative to last evaluation in 2012.    The possibility of significant medication side effects secondary to her anti-epileptic and psychotropic drug regime should be explored, as well possible onset of dementia.    In addition, the patient's home situation should be evaluated by a county worker if possible as patient is believed to be vulnerable.    Follow-up by neuropsychology should be considered if a medication adjustment results in some improvement in her presentation\"  "

## 2021-06-18 NOTE — PATIENT INSTRUCTIONS - HE
Patient Instructions by Griselda Garcia PT at 11/3/2020 12:00 PM     Author: Griselda Garcia PT Service: -- Author Type: Physical Therapist    Filed: 11/3/2020 12:45 PM Encounter Date: 11/3/2020 Status: Addendum    : Griselda Garcia PT (Physical Therapist)    Related Notes: Original Note by Griselda Garcia PT (Physical Therapist) filed at 11/3/2020 12:39 PM         LAYING DOWN EXERCISES   ABDOMINAL BRACING    While lying on your back, tighten your stomach muscles as you draw your navel down towards the floor and push your back flat.  Can do with knees bent or straight.  Hold x2-3 seconds x5-10 reps      CLAM SHELLS    While lying on your side with your knees bent, draw up the top knee while keeping contact of your feet together.    Do not let your pelvis roll back during the lifting movement.    x5-10 reps 1-2x/day     STANDING EXERCISE   SIT TO STAND    While making sure you bend at the hip, SLOWLY sit down    Your chest may come forward over your toes, but your spine should stay in neutral as shown.    ARMS wherever they want as long as they don't help  x5-10 reps  1-2x/day       Try walking in the hallway with someone following with the wheelchair - use the walker for 1-2 minutes 2-3x/day for practice with walking    Try scooting yourself around with your heels pulling on the ground or heels pushing you backwards - try in hallway 1-2x/day

## 2021-06-19 NOTE — PROGRESS NOTES
Office Visit - Follow Up   Valentina Olmedo   58 y.o. female    Date of Visit: 8/15/2018    Chief Complaint   Patient presents with     Cough     Urinary Frequency     oxybutynin        Assessment and Plan   1. Cough  I think this is likely a sinusitis.  She is at risk for bronchitis.  Check a chest x-ray treat with Augmentin.  Albuterol refilled.  Smoking cessation advised  - XR Chest 2 Views    2. SOB (shortness of breath)  - XR Chest 2 Views    3. Nonintractable epilepsy without status epilepticus, unspecified epilepsy type (H)  Stable    4. Chronic obstructive pulmonary disease (H)  Smoking cessation advised, albuterol, I am not going to give steroids at this time she is not wheezing    5. Nicotine dependence, cigarettes, with other nicotine-induced disorders  We will can cessation advised    6. Pulmonary embolism with infarction (H)  Continue warfarin, needs INR    7. Malignant neoplasm of thyroid gland (H)  Status post resection    8. Overactive bladder  Refill oxybutynin    9. Morbid obesity with BMI of 45.0-49.9, adult (H)  The following high BMI interventions were performed this visit: encouragement to exercise and lifestyle education regarding diet    Return in about 4 weeks (around 9/12/2018) for recheck.     History of Present Illness   This 58 y.o. old woman comes in for evaluation of cough and shortness of breath.  This is been going on for over a week.  She has had nasal congestion ear pain and sore throat.  She has had a productive cough.  She has had subjective fevers and chills.  No nausea or vomiting.  No diarrhea.  She continues to smoke about 1 cigarette per day.  She is taking warfarin with history of pulmonary embolus.  She is not using any inhalers.  Otherwise stable no recent seizures.  Would like medication for overactive bladder.  Previously taking oxybutynin with good result    Review of Systems: A comprehensive review of systems was negative except as noted.     Medications, Allergies  "and Problem List   Reviewed and updated     Physical Exam   General Appearance:   No acute distress    /74 (Patient Site: Right Arm, Patient Position: Sitting, Cuff Size: Adult Regular)  Pulse 88  Ht 5' 6\" (1.676 m)  Wt (!) 288 lb (130.6 kg)  SpO2 96%  BMI 46.48 kg/m2    HEENT exam is notable for erythema of the posterior oropharynx and soft palate.  She has redness of the nasal mucosa.  Her ears are without effusion or purulence.  She has no cervical lymphadenopathy.  Cardiovascular regular rate and rhythm no murmur gallop or rub lungs are generally clear to auscultation bilaterally, no wheezing     Additional Information   Current Outpatient Prescriptions   Medication Sig Dispense Refill     albuterol (PROVENTIL HFA;VENTOLIN HFA) 90 mcg/actuation inhaler Inhale 2 puffs every 4 (four) hours as needed for wheezing. 1 Inhaler 6     atorvastatin (LIPITOR) 20 MG tablet Take 1 tablet by mouth every evening for cholesterol 90 tablet 3     benztropine (COGENTIN) 1 MG tablet Take 1 tablet (1 mg total) by mouth daily. 30 tablet 11     carBAMazepine (TEGRETOL) 200 mg tablet Take 200 mg by mouth 3 (three) times a day. 2 tabs (400mg) q AM, 1.5 tabs (300mg) q PM and HS       diphenhydrAMINE (BENADRYL) 50 MG tablet Take 1 tablet (50 mg total) by mouth 2 (two) times a day as needed for sleep (anxiety or agitation). 60 tablet 11     DULoxetine (CYMBALTA) 60 MG capsule Take 2 capsules (120 mg total) by mouth daily. 60 capsule 11     EPINEPHrine 0.15 mg/0.15 mL atIn Inject 1 each into the shoulder, thigh, or buttocks.       ethotoin (PEGANONE) 250 mg Tab Take 1,000 mg by mouth 3 (three) times a day.       levothyroxine (SYNTHROID, LEVOTHROID) 175 MCG tablet Take 1 tablet by mouth daily 30 tablet 10     omeprazole (PRILOSEC) 20 MG capsule Take 1 capsule by mouth daily before breakfast (generic.Prilosec) 30 capsule 10     polyethylene glycol (MIRALAX) 17 gram packet Take 17 g by mouth daily as needed.        QUEtiapine " (SEROQUEL) 200 MG tablet Take 1 tablet (200 mg total) by mouth at bedtime (generic. Seroquel) 30 tablet 0     risperiDONE (RISPERDAL) 1 MG tablet Take 1 tablet (1 mg total) by mouth at bedtime. 30 tablet 2     senna (SENOKOT) 8.6 mg tablet Take 2 tablets by mouth 2 (two) times a day.       traZODone (DESYREL) 150 MG tablet Take 1 tablet (150 mg total) by mouth at bedtime. 30 tablet 2     VITAMIN D3 2,000 unit capsule Take 1 capsule by mouth daily (vitamin d3) 30 capsule 5     warfarin (COUMADIN) 5 MG tablet Take 10 mg 2 days a week and 7.5 mg the other 5 days 90 tablet 11     amoxicillin-clavulanate (AUGMENTIN) 875-125 mg per tablet Take 1 tablet by mouth 2 (two) times a day for 10 days. 20 tablet 0     oxybutynin (DITROPAN XL) 10 MG ER tablet Take 1 tablet (10 mg total) by mouth daily. 30 tablet 5     No current facility-administered medications for this visit.      Allergies   Allergen Reactions     Aspirin (Tartrazine Only) Shortness Of Breath     Can have asa as long as no yellow dye     Gabapentin Rash     Guilherme Johnsons rash     Venom-Honey Bee Anaphylaxis     Vistaril [Hydroxyzine Pamoate] Other (See Comments)     Excessive sedation     Yellow Dye Shortness Of Breath     Linked only to tartazine.     Hydroxyzine Other (See Comments)     Sluggish; unable to wake up     Latex Itching     Other Environmental Allergy      Dust, Mold, Pollen     Phenobarbital Other (See Comments)     unknown     Latex Itching     Social History   Substance Use Topics     Smoking status: Current Every Day Smoker     Packs/day: 0.50     Years: 35.00     Types: Cigarettes     Start date: 6/10/1974     Smokeless tobacco: Never Used      Comment: smoking 1-2 a day     Alcohol use No       Review and/or order of clinical lab tests:  Review and/or order of radiology tests:  Review and/or order of medicine tests:  Discussion of test results with performing physician:  Decision to obtain old records and/or obtain history from someone  other than the patient:  Review and summarization of old records and/or obtaining history from someone other than the patient and.or discussion of case with another health care provider:  Independent visualization of image, tracing or specimen itself:    Time:      Donald Rooney MD

## 2021-06-19 NOTE — LETTER
Letter by Donald Rooney MD at      Author: Donald Rooney MD Service: -- Author Type: --    Filed:  Encounter Date: 8/21/2019 Status: (Other)        [facility]  [address]  [facility] [address]  [phone]         Valentina Olmedo  [address]  [facility] [address]        08/21/19    Dear Valentina,    At [facility] we care about your health and well-being. Your primary care provider is committed to ensuring you receive high quality care and has chosen a network of specialists to assist in providing that care. Recently Dr. Rooney referred you to Endocrinology for specialty care.     Please call [facility] at [phone] your earliest convenience for assistance in scheduling an appointment. If you have already scheduled this appointment, please disregard this notice. Thank you for choosing [facility] for your healthcare needs.       Sincerely,       [facility] Specialty Scheduling

## 2021-06-19 NOTE — LETTER
Letter by Donald Rooney MD at      Author: Donald Rooney MD Service: -- Author Type: --    Filed:  Encounter Date: 7/29/2019 Status: (Other)         Valentina Olmedo  [address]  [facility] [address]      August 13, 2019      Dear Ms. Olmedo,    You are currently under the care of [facility] for your warfarin (Coumadin ) therapy. Our records show that your last INR was on 7/12/2019 and you were due to come back on 7/26/2019. We have attempted to reach you by phone to schedule an INR appointment, but have not heard back from you.    The correct dose of warfarin for you may change from time to time based on your INR result. We would like to ensure that your warfarin dose is correct and that you are not having any side effects. It is not safe for you to be on Warfarin if your INR is not checked regularly. If your INR is too high, serious bleeding can occur. If your INR is too low, blood clots can form and lead to heart attack, stroke or a blood clot in the lung.      Getting your INR checked as instructed is required in order for the [facility] to continue managing and refilling your warfarin. We realize that it might be difficult for you to have frequent blood testing, but it is for your own safety.    Please contact us at [phone] as soon as possible to schedule an INR appointment. Our clinic staff is available Monday through Friday 8:00 am to 5:00 pm.  If you have been told to stop taking warfarin or your warfarin is being managed by another healthcare provider, please call and inform our staff.       Sincerely,     [facility]

## 2021-06-19 NOTE — PROGRESS NOTES
Spoke with patient's  (YULIYA) who was listed as a primary number (That was changed to patient's spouse per YULIYA request)    Patient also has PCP appointment today, she is not feeling well.  Patient is not getting messages because she doesn't know how to check her voicemail and mailing information is not helpful either because she does not open her mail according to her .  Patient would benefit greatly from a [employer] worker but she is not on [place] and would need to do a spend down. Patient is not willing to do so, they have a lease on a [employer] vehicle and car insurance ($600), rent for their low income housing, other bills and food.   Returning the leased vehicle and putting that money toward her spend down she would benefit from many more services.    Next outreach due: 8/29/18

## 2021-06-19 NOTE — PROGRESS NOTES
"Mental Health Visit Note    7/19/2018    Start time: 1:00pm    Stop Time: 1:55pm  Session # 9    Valentina Olmedo is a 58 y.o. female is being seen today for    Chief Complaint   Patient presents with      Follow Up     Depression     low energy     Anxiety     worried about money   .     New symptoms or complaints: None    Functional Impairment:   Personal: 3  Family: 2  Work: 4  Social: 4    Clinical assessment of mental status: (Same as last session 6/21/2018) Patient's grooming is Well groomed, his attire is Appropriate, and his age appears Appears Stated. Behavior towards examiner is Cooperative, motor activity is Within normal and Tremors/Tics (shaking leg), and eye contact is Staring.  Patient's mood is Depressed, and affect is Blunted and Flat.  Speech/language is Delayed/Hesitant, attention is Distractible, and concentration is Brief. Thought process is Slow, thought content is Within noraml and  Within normal.  Patient's orientation is X 3, memory is  Impairment and Recent, judgement is Impairment and Minimal, and estimated intelligence is Below Average. Demonstrated insight is Adequate while fund of knowledge is adequate.    Suicidal/Homicidal Ideation present: None reported.    Patient's impression of their current status:   Patient reports mood as anxious and \"tired\".  States she didn't sleep last night, worried about money.  Processes her anxieties over finances: feels that they don't have enough for bills.  Has a $200 neurology bill \"from a no-show\".  Processed getting to the point where she and spouse re-homed 2 of 3 cats, recognized her sadness without them.     Followed up with most of her therapy homework:  Sought out social support from their couple friends, played Chel Dias, Phase 10.  She attended a local festival Summer Fest where she did some walking. She convinced her  to make use of free senior lunch in the building, which has helped her concern about his weight loss.  She made " "use of food shelf, utilized cheaper stores  in an effort to alleviate financial and thus MH stressors.  Did not make use of \"50 Ways to Take a Break\".  She made some efforts to get up daily, get dressed daily.  Walked around her building x2.      Therapist impression of patients current state: Patient continues with anxiety symptoms related to finances.  Memory and energy continues to be concerns.  She has made slightly increased effort in social contact and getting out of the home.  She is utilizing supports in place to notably stay out of the emergency room and mental health hospitalizations.    Processed negative cognitions, reinforced strengths, validated patient efforts and feelings. Processed grief over loss of cats, discussed benefits of more stable apartment condition and few expenses.   Discussed financial worries, discussed that funds they have available to them with different checks can pay the bills identified.  Patient identifies that she'd be more comfortable if they had a \"cushion\" in the bank.     Type of psychotherapeutic technique provided: Client centered, Solution-focused and CBT     Progress toward short term goals: Progress as expected: Sought out social support from their couple friends, played Chel Dias, Phase 10.  She attended a local festival Summer Fest where she did some walking. She convinced her  to make use of free senior lunch in the building, which has helped her concern about his weight loss.  She made use of food shelf, utilized cheaper stores  in an effort to alleviate financial and thus MH stressors.  Did not make use of \"50 Ways to Take a Break\".  She made some efforts to get up daily, get dressed daily.  Walked around her building x2.      Review of long term goals: Not done at today's visit and Date of last review 4/26/18    Diagnosis:   1. Depression, major, recurrent, moderate (H)    2. Generalized anxiety disorder    3. Borderline personality disorder    4. " "Cognitive deficits        Plan and Follow up: Patient will return for follow up in two weeks. She will contact neurology clinic regarding possible financial assistance for her no-show bill.  Begin to put some money away as a \"cushion\" against distress of hard times.  She will continue the make use of supports including the food shelf to alleviate financial and thus mental health stressors.  Her to get up daily, get dressed, and be with her spouse to the living room and gentle walking and excursions into the community.    Discharge Criteria/Planning: Client has chronic symptoms and ongoing therapy for maintenance stability recommended.    Loretta Barney MSW, LICSW  7/19/2018  "

## 2021-06-19 NOTE — LETTER
Letter by Donald Rooney MD at      Author: Donald Rooney MD Service: -- Author Type: --    Filed:  Encounter Date: 7/22/2019 Status: (Other)         Valentina Olmedo  [address]  [facility] [address]             July 22, 2019         Dear Ms. Olmedo,    Below are the results from your recent visit:    Resulted Orders   Comprehensive Metabolic Panel   Result Value Ref Range    Sodium 136 136 - 145 mmol/L    Potassium 4.0 3.5 - 5.0 mmol/L    Chloride 106 98 - 107 mmol/L    CO2 21 (L) 22 - 31 mmol/L    Anion Gap, Calculation 9 5 - 18 mmol/L    Glucose 103 70 - 125 mg/dL    BUN 11 8 - 22 mg/dL    Creatinine 1.31 (H) 0.60 - 1.10 mg/dL    GFR MDRD Af Amer 51 (L) >60 mL/min/1.73m2    GFR MDRD Non Af Amer 42 (L) >60 mL/min/1.73m2    Bilirubin, Total 0.2 0.0 - 1.0 mg/dL    Calcium 8.8 8.5 - 10.5 mg/dL    Protein, Total 6.5 6.0 - 8.0 g/dL    Albumin 3.3 (L) 3.5 - 5.0 g/dL    Alkaline Phosphatase 103 45 - 120 U/L    AST 27 0 - 40 U/L    ALT 36 0 - 45 U/L    Narrative    Fasting Glucose reference range is 70-99 mg/dL per  American Diabetes Association (ADA) guidelines.   Glycosylated Hemoglobin A1c   Result Value Ref Range    Hemoglobin A1c 5.6 3.5 - 6.0 %   Thyroid Cascade   Result Value Ref Range    TSH 9.19 (H) 0.30 - 5.00 uIU/mL   HM2(CBC w/o Differential)   Result Value Ref Range    WBC 4.0 4.0 - 11.0 thou/uL    RBC 4.79 3.80 - 5.40 mill/uL    Hemoglobin 14.0 12.0 - 16.0 g/dL    Hematocrit 41.7 35.0 - 47.0 %    MCV 87 80 - 100 fL    MCH 29.3 27.0 - 34.0 pg    MCHC 33.7 32.0 - 36.0 g/dL    RDW 11.9 11.0 - 14.5 %    Platelets 303 140 - 440 thou/uL    MPV 7.2 7.0 - 10.0 fL   Electrophoresis, Protein, Serum, Cascade   Result Value Ref Range    Albumin % 54.9 51.0 - 67.0 %    Albumin  3.6 3.2 - 4.7 g/dL    Alpha 1 % 3.2 2.0 - 4.0 %    Alpha 1 0.2 0.1 - 0.3 g/dL    Alpha 2 % 15.4 (H) 5.0 - 13.0 %    Alpha 2 1.0 (H) 0.4 - 0.9 g/dL    % Beta 13.6 10.0 - 17.0 %    Beta 0.9 0.7 - 1.2 g/dL    Gamma  Globulin % 12.9 9.0 - 20.0 %    Gamma Globulin 0.8 0.6 - 1.4 g/dL    ELP Comment Normal serum protein electrophoesis.      Protein, Total 6.5 6.0 - 8.0 g/dL    Path ICD: G62.0     Interpreted By: Yosef Aguilar MD     Syphilis Screen, Cascade   Result Value Ref Range    Treponema Antibody (Syphilis) Negative Negative   HIV Antigen/Antibody Screening Cascade   Result Value Ref Range    HIV Antigen / Antibody Negative Negative    Narrative    Method is Abbott HIV Ag/Ab for the detection of HIV p24 antigen, HIV-1 antibodies and HIV-2 antibodies.   T4, Free   Result Value Ref Range    Free T4 0.9 0.7 - 1.8 ng/dL       Labs generally stable with the exception of your thyroid hormone.  We have had difficult time treating this and I recommended that you see an endocrinologist.    Please call with questions or contact us using [employer].    Sincerely,        Electronically signed by Donald Rooney MD

## 2021-06-19 NOTE — PROGRESS NOTES
Patient is here for follow up and medication management.      Patient is here after medication changes to see if her cognition is better.  Patient states that maybe she is a little better.    Sleep: Trouble getting to sleep    Discus=0    [place]  was reviewed prior to seeing patient today. See below for embedded report.    No Activity    Correct pharmacy verified with patient and confirmed in snapshot? [x] yes []no    Charge captured ? [] yes  [] no    Medications Phoned  to Pharmacy [] yes [x]no  Name of Pharmacist:  List Medications, including dose, quantity and instructions      Medication Prescriptions given to patient   [] yes  [x] no   List the name of the drug the prescription was written for.       Medications ordered this visit were e-scribed.  Verified by order class [x] yes  [] no    Medication changes or discontinuations were communicated to patient's pharmacy: [] yes  [x] no    UA collected [] yes  [x] no    [place] Prescription Monitoring Program Reviewed? [x] yes , no activity    Referrals were made to:  none    Future appointment was made: [x] yes  [] no    Dictation completed at time of chart check: [] yes  [x] no    I have checked the documentation for today s encounters and the above information has been reviewed and completed.

## 2021-06-19 NOTE — PROGRESS NOTES
"Date of Service:  2018    Name:  Valentina Olmedo  :  1960  MRN:  [id number]    HPI:   Valentina Olmedo is a 58 y.o. female with neurocognitive deficits (neuropsych testing done 3/20/18 at [facility], found under media tab), borderline personality disorder, major depressive disorder,   seizure disorder, CHRIS, hypothyroid.  She is accompanied by [facility] CM, \"Patty\".    Last seen in . Risperdal and trazodone dose reduced due to concerns of sedation and falls.  Home safety eval completed, throw rug removed.    ROS: c/o dizzy when she stands. Fell on her stomach yesterday, \"I was not hurt\".  Sleep problems- awake at night, EDS and sleeps during the day.  Does not like CPAP.    --------------------------------------------------------------------------------------------------------  Neuropsych testing by Irais De La Cruz PhD from [facility] (3/20/18).  Note excerpt:  \"Implications/recommendations:  \"Her current test performance demonstrates significant decline relative to last evaluation in .    The possibility of significant medication side effects secondary to her anti-epileptic and psychotropic drug regime should be explored, as well possible onset of dementia.   Follow-up by neuropsychology should be considered if a medication adjustment results in some improvement in her presentation\"  ---------------------------------------------------------------------------------------------------------------------------    Therapist- she has good alliance with Loretta TAMAYO.    Social  Lives in 1 BR apartment in [place] on the 4th Fl. Uses elevator.  They have 3 cats-Yulisa Camarillo Parker.    Harry Ellis [phone]  Weekly Med set- up:  [phone] ([place])    Medical  Hx CHRIS- Dx'd at [facility] by Benjie Allan MD 12/10/2013, \"AutoCPAP 5-20 cms of water is recommended, while patient sleeps\".    Sleep study done at [facility] 2015- \"Pt did not have any significant sleep apnea during her " "test\", 4/6/15 Fernando Montaño MD     P.E.- April 2017, started on warfarin  Neurologist Praveena Benitez, [facility]          Medications:       Current Outpatient Prescriptions on File Prior to Visit   Medication Sig Dispense Refill     albuterol (PROVENTIL HFA;VENTOLIN HFA) 90 mcg/actuation inhaler Inhale 2 puffs every 4 (four) hours as needed for wheezing. 1 Inhaler 6     atorvastatin (LIPITOR) 20 MG tablet Take 1 tablet by mouth every evening for cholesterol 90 tablet 3     benztropine (COGENTIN) 1 MG tablet Take 1 tablet (1 mg total) by mouth daily. 30 tablet 11     carBAMazepine (TEGRETOL) 200 mg tablet Take 200 mg by mouth 3 (three) times a day. 2 tabs (400mg) q AM, 1.5 tabs (300mg) q PM and HS       diphenhydrAMINE (BENADRYL) 50 MG tablet Take 1 tablet (50 mg total) by mouth 2 (two) times a day as needed for sleep (anxiety or agitation). 60 tablet 11     DULoxetine (CYMBALTA) 60 MG capsule Take 2 capsules (120 mg total) by mouth daily. 60 capsule 11     ethotoin (PEGANONE) 250 mg Tab Take 1,000 mg by mouth 3 (three) times a day.       levothyroxine (SYNTHROID, LEVOTHROID) 175 MCG tablet Take 1 tablet by mouth daily 30 tablet 10     omeprazole (PRILOSEC) 20 MG capsule Take 1 capsule by mouth daily before breakfast (generic.Prilosec) 30 capsule 10     QUEtiapine (SEROQUEL) 200 MG tablet Take 1 tablet (200 mg total) by mouth at bedtime (generic. Seroquel) 30 tablet 0     senna (SENOKOT) 8.6 mg tablet Take 2 tablets by mouth 2 (two) times a day.       VITAMIN D3 2,000 unit capsule Take 1 capsule by mouth daily (vitamin d3) 30 capsule 5     warfarin (COUMADIN) 5 MG tablet Take 10 mg 2 days a week and 7.5 mg the other 5 days 90 tablet 11     [DISCONTINUED] risperiDONE (RISPERDAL) 1 MG tablet Take 1 tablet (1 mg total) by mouth at bedtime. 30 tablet 2     [DISCONTINUED] traZODone (DESYREL) 150 MG tablet Take 1 tablet (150 mg total) by mouth at bedtime. 30 tablet 2     EPINEPHrine 0.15 mg/0.15 mL atIn Inject " 1 each into the shoulder, thigh, or buttocks.       polyethylene glycol (MIRALAX) 17 gram packet Take 17 g by mouth daily as needed.        No current facility-administered medications on file prior to visit.           Associated Clinical Documents:       Notes reviewed in EPIC and [facility] including: medication reconciliation, nurses's notes, progress notes, recent labs, PMH, and OSH records.    ROS:       10 point ROS was negative except for the items listed in HPI.        MSE:      Vital signs: refer to nursing notes from today.   Alert & oriented x 3.   Appearance: Appears stated age, casually dressed.  Speech: Slow rate, rhythm and tone.  Gait: Normal.  Musculoskeletal: Psychomotor slowing, no abnormal movements.  Mood/Affect: Flat/blunted  Thought Process: slow, circumferential  Thought Content: Occ passive SI, no plan. No homicide ideation.  Associations: Intact, no delusions.  Perceptions: See HPI  Memory: recent and remote memory intact.  Attention span and concentration: Fair  Language: Intact.  Fund of Knowledge: Normal.  Insight and Judgement: Fair    Impression:        1. Neurocognitive deficits per testing done 3/20/18.  2. major depressive disorder, recurrent, currently in remission.  I recommended day program, but insurance does not cover. She has Medicare, and would need spend-down to get MA and CADI.  3. CHRIS-last appt 2015. needs new sleep eval     Plan:       1.  Refer to sleep clinic   2.  No med changes- f/u after sleep appt, 2-3 mos  3. Pt enc to see PCP for dizzyness  4. Cont therapy.        Total Time:      25 minutes      Coordination of Care:       25 Minutes spent on this visit with >50% time spent on coordination of care with staff, educating patient and family about diagnosis, treatment options, risks, benefits and side effects of medications, and providing supportive therapy.  Time also spent on reviewing  EHR, documentation and entering orders.      This dictation was completed with speech  recognition software and there may be unintended word substitutions.    -----------------------------------

## 2021-06-19 NOTE — PROGRESS NOTES
SW completed chart review for newly enrolled pt.  Pt is well connected to  Behavioral Health services and has a [facility] mental health .  SW added  information to care team tab.  [facility]  available as needed to coordinate with community providers.

## 2021-06-19 NOTE — PROGRESS NOTES
My Clinic Care Coordination Care Plan    [facility]  [facility] [address]  [address]  [address] [place]  [address]  [phone]    My Preferred Method of Contact:  Phone: [phone]    My Primary/Preferred Language:  English    Preferred Learning Style:  Face to face discussion, Pictures/Diagrams and Hands on teaching    Emergency Contact: Extended Emergency Contact Information  Primary Emergency Contact: Jass Olmedo  Address: [address] [place] [address] [id number] [place] [address]  Home Phone: [phone]  Relation: Spouse  Secondary Emergency Contact: Nadeen Morgan   [place]  Mobile Phone: [phone]  Relation: Sister-In-Law     PCP:  Donald Rooney MD  Specialists:    Care Team            Donald Rooney MD PCP - General, Internal Medicine    [phone]     Dionne Olivas [facility] Care Coordination Care Guide, Clinic Care Coordination    [facility] -[phone] Fax [phone]     Loretta Barney [facility] , Licensed Clinical     [phone]     Breana Puente MD Physician, Psychiatry    [phone]           Hospitalizations and/or ED Visits  Most Recent: 1/11/18     Previous:  10/5/17  Reason:  Fall & short of breath    Concerns regarding my health: Financial stress, poor nutrition and obesity    Advanced Directive/Living Will: The patient was given information regarding Adanced Directives/Living Will      Valentina elected to enroll in the [facility] Care Coordination.  Valentina was given a copy of the Clinic Care Coordination brochure and full description of how to access their care team both during clinic hours and after hours.   My Care Guide's Name and Phone Number:  Dionne @ [phone]   The Care Guide and myself agreed to be in contact monthly.      Medication Update  The patient's medication list is up to date per Dr. Rooney    [facility] and  Immunization Update  The patient's preventive health screenings and immunizations are up to date per Dr. Rooney .    My Emergency Plan  Emergency Plan Recommendation:     When to Use the Emergency Department (ED)  An emergency means you could die if you don t get care quickly. Or you could be hurt permanently (disabled). Read below to know when to use -- and when not to use -- an emergency department (also called ED).     Dangers to your life  Here are examples of emergencies. These need immediate care:  A hard time breathing  Severe chest pain  Choking  Severe bleeding  Suddenly not able to move or speak  Blacking out (fainting)`  Poisoning     Dangers of permanent injuries  Here are other emergencies. These also need immediate care:  Deep cuts or severe burns  Broken bones, or sudden severe pain and swelling in a joint     When it s an emergency  If you have an emergency, follow these steps:     1. Go to the nearest emergency department  If you can, go to the hospital ED closest to you right away.  If you cannot get there right away, or if it is not safe to take yourself, call 911 or your police emergency number.  2. Call your primary care doctor  Tell your doctor about the emergency. Call within 24 hours of going to the ED.  If you cannot call, have someone call for you.  Go to your doctor (not the ED) for any follow-up care.     When it s not an emergency  If a problem is not an emergency, follow these steps:     1. Call your primary care doctor  If you don t know the name of your doctor, call your health plan.  If you cannot call, have someone call for you.  2. Follow instructions  Your doctor will tell you what you should do.  You may be told to see your doctor right away. You may be told to go to the ED. Or you may be told to go to an urgent care center.  Follow your doctor s advice.  Knowing When to Seek Treatment for Mental Health Concerns     What are the symptoms of a potential problem in an adult?  The  "following are the most common symptoms of a potential emotional, behavioral, and/or developmental problem in an adult, which necessitates a psychiatric evaluation. However, each individual may experience symptoms differently. Symptoms may include:    Significant decline in work performance, poor work attendance, and/or lack of productivity    Social withdrawal from activities, friends, and/or family    Substance (alcohol and drugs) abuse    Sleep disturbances (like persistent nightmares, insomnia, hypersomnia, or flashbacks)    Depression (poor mood, negativity, or mood swings)    Appetite changes (like significant weight gain or loss)    Continuous or frequent aggression    Continuous or frequent anger (for periods longer than 6 months)    Excessive worry and/or anxiety    Threats to self or others    Thoughts of death    Thoughts and/or talk of suicide    Destructive behaviors (like criminal activity, or stealing)    Sexually \"acting out\"    Lying and/or cheating    Many physical complaints, including being constantly tense and/or frequent aches and pains that cannot be traced to a physical cause or injury    Sudden feelings of panic, dizziness, or increased heartbeat    Increased feelings of guilt, helplessness, and/or hopelessness    Decreased energy  The symptoms of a potential emotional, behavioral, and/or developmental problem may look like other conditions. Always talk with your child's health care provider for a diagnosis.     [facility] Mental Health Crisis Lines:  [facility] [phone]  [facility] [phone]  [facility]--[phone]  [facility] [phone]  [facility], Adults [phone]  [facility], Children [phone]  [facility], Adults [phone]  [facility], Children [phone]  Epilepsy: Safety during a Seizure       Let family and friends know what to expect and how to react when you have a seizure. This helps keep them calm and you safe. All " seizures should be treated with care, but tonic-clonic seizures (seizures during which you lose consciousness) require more attention. Here are some pointers for loved ones.  What to Know     Seizures typically last less than 3 minutes, but it will feel like it is longer. People recover safely from most seizures. During a tonic-clonic seizure, the person may appear to stop breathing or turn slightly blue. This may be scary for you, but try to stay calm. Afterward, the person may be tired, confused, and achy. He or she may need to sleep for several hours to fully recover.  What to Do     During any seizure, stay with the person until it is over. Note the time when the seizure starts and ends. Don t try to stop the seizure. During a tonic-clonic seizure, also do the following:    Move hard or sharp objects out of the way.    Lay the person on a flat surface and turn them on their side.    Place a flat, soft object under their head.    Don t try to restrain the person. Both of you could get hurt.    Don t put anything in the person s mouth. They cannot swallow their tongue, and you risk breaking their teeth or being bitten.    Don t give the person medications during a seizure, unless you ve been trained by a health care provider.    Speak quietly to the person as they recover.     Call 911 if the seizure lasts longer than 5 minutes, there is no conscious interval between 2 seizures, or several seizures occur in a row. These events could represent status epilepticus, a medical emergency. Also call 911 if the seizure is very different from past seizures, a first time seizure, the patient does not 'wake up' or regain consciousness, or if the person is pregnant or has diabetes.  Preventing Falls    Having a health problem can make you more likely to fall. Taking certain kinds of medicines may also increase your risk of falls. So, improving your health can help you avoid a fall. Work with your healthcare provider to manage  health problems and to review your medicines. If you have your health under control, your risk of falling is lessened.     When to call your healthcare provider  Be sure to call your healthcare provider if you fall. Also call if you have any of these signs or symptoms (someone else may need to point them out to you):    Feeling lightheaded or dizzy more than once a day    Losing your balance often or feeling unsteady on your feet    Feeling numbness in your legs or feet, or noticing a change in the way you walk    Having a steady decline in your memory or mental sharpness       All [facility] clinic patients have access to a Nurse 24 hours a day, 7 days a week.  If you have questions or want advice from a Nurse, please know [facility] is here for you.  You can call your clinic and they will connect you or you can call Care [facility] at [phone].  [facility] also has Walk In Care clinics in multiple locations.  Call the number listed above for more information about our Walk In Care clinics or visit the [facility] website at [email or web address].    Patient Support  I will ask my emergency contact for help in supporting me in these goals.  Relationship to patient: Spouse   Cell # : [phone] , best time to call is anytime

## 2021-06-19 NOTE — PROGRESS NOTES
Attempt 1-Care Guide called patient.  If this patient is returning our call please transfer to Dionne at ext. [phone]    Next outreach due: 8/2/18

## 2021-06-19 NOTE — LETTER
Letter by Shonna Ernandez RN at      Author: Shonna Ernandez RN Service: -- Author Type: --    Filed:  Encounter Date: 3/28/2019 Status: (Other)         Valentina Olmedo  [address] [place] [address] [address]  [facility] [address]      April 12, 2019      Dear Ms. Olmedo,    You are currently under the care of [facility] for your warfarin (Coumadin ) therapy. Our records show that your last INR was on 3/11/2019 and you were due to come back on 3/20/2019. We have attempted to reach you by phone to schedule an INR appointment, but have not heard back from you.    The correct dose of warfarin for you may change from time to time based on your INR result. We would like to ensure that your warfarin dose is correct and that you are not having any side effects. It is not safe for you to be on Warfarin if your INR is not checked regularly. If your INR is too high, serious bleeding can occur. If your INR is too low, blood clots can form and lead to heart attack, stroke or a blood clot in the lung.      Getting your INR checked as instructed is required in order for the [facility] to continue managing and refilling your warfarin. We realize that it might be difficult for you to have frequent blood testing, but it is for your own safety.    Please contact us at [phone] as soon as possible to schedule an INR appointment. Our clinic staff is available Monday through Friday 8:00 am to 5:00 pm.  If you have been told to stop taking warfarin or your warfarin is being managed by another healthcare provider, please call and inform our staff.       Sincerely,     [facility]

## 2021-06-19 NOTE — PROGRESS NOTES
Attempt 2-Care Guide called patient.  If this patient is returning our call please transfer to Dionne at ext. [phone].    Next outreach due: 8/15/18

## 2021-06-20 NOTE — PROGRESS NOTES
"Mental Health Visit Note    10/11/2018    Start time: 1:00pm    Stop Time: 1:55pm  Session # 11    Valentina Olmedo is a 58 y.o. female is being seen today for   Chief Complaint   Patient presents with      Follow Up     Depression     sadness about estrangement from family, not having friends     Anxiety     ongoing anxiety about financial stress   .     New symptoms or complaints: depression related to lonliness with little social connection, ongoing worries about unpaid bills.    Functional Impairment:   Personal: 3  Family: 2  Work: 4  Social: 4    Clinical assessment of mental status: Patient's grooming is Well groomed, his attire is Appropriate, and his age appears Appears Stated. Behavior towards examiner is Cooperative, motor activity is Within normal and Tremors/Tics (shaking leg), and eye contact is Staring.  Patient's mood is Sad and anxious, and affect is Blunted and Flat.  Speech/language is Delayed/Hesitant, attention is Distractible, and concentration is Brief. Thought process is Slow, thought content is Within noraml and  Within normal.  Patient's orientation is X 3, memory is  Impairment and Recent, judgement is Impairment and Minimal, and estimated intelligence is Below Average. Demonstrated insight is Adequate while fund of knowledge is adequate.    Suicidal/Homicidal Ideation present: None reported.    Patient's impression of their current status:   Patient identifies mood is \"sad and lonely\".  Processes feelings about being estranged from her siblings. \"All of my sisters don't want nothing to do with me including my twin.\"  She is unable to identify precipitating stressor.  She and her spouse have also not seen long-term couple friends.    Patient reports mixed follow-up with therapy homework: Did not speak with her mental health  regarding a drop in center for SPMI in their area.  Placed a call to her neurology clinic regarding possible financial assistance for no-show bill.  " Continues to make use of community supports including food shelf to alleviate financial and thus mental health stressors.  States that she gets up and dressed only about half the time, mostly when she her spouse have appointments.  Walking is limited to those appointments.  Considered lack of  in [place] and is decided together with her spouse not to move from the [facility].    Therapist impression of patients current state: Despite moderate stressors of chronic financial issues, patient seems to be holding their own in the community without emergency room or mental health admissions.  She shows insight into the ways that the social isolation affect her.  Lack of follow-through with exploring resources may be a measure of memory as well as apathy/depression.    Processed negative cognitions, reinforced strengths, validated patient efforts and feelings. Discussed possible ways to decrease social isolation: mental health day treatment, or day program like [facility].  Helped patient place a call to her mental health  and left message  with request for referral.  Discussed local community centers with walking tracks which patient agrees to visit.    Type of psychotherapeutic technique provided: Client centered, Solution-focused and CBT      Progress toward short term goals: Mixed progress: Did not speak with her mental health  regarding a drop in center for SPMI in their area.  Placed a call to her neurology clinic regarding possible financial assistance for no-show bill.  Continues to make use of community supports including food shelf to alleviate financial and thus mental health stressors.  States that she gets up and dressed only about half the time, mostly when she her spouse have appointments.  Walking is limited to those appointments.  Considered lack of  in [place] and is decided together with her spouse not to move from the [place]  area.    Review of long term goals: Not done at today's visit and Date of last review 8/29/2018    Diagnosis:   1. Depression, major, recurrent, moderate (H)    2. Generalized anxiety disorder    3. Borderline personality disorder (H)    4. Cognitive deficits        Plan and Follow up: Patient will return for follow up in two weeks.  She will walk at [place] with spouse. Will investigate community centers to see if there is walking track.  She will follow-up with mental health  regarding some kind of social support program.  She will continue to make use of supports like the food shelf to alleviate financial and stress concerns.  She will continue to make efforts to get up and dressed daily, be with her spouse in the living room instead of the bedroom, attempt gentle walking and excursions into the community.    Discharge Criteria/Planning: Client has chronic symptoms and ongoing therapy for maintenance stability recommended.    Loretta BRINK, LICSW  10/11/2018

## 2021-06-20 NOTE — PROGRESS NOTES
Assessment and Plan:   1. Routine general medical examination at a health care facility  This is a 58-year-old woman comes in for annual wellness visit.  Issues as below    2. Nonintractable epilepsy without status epilepticus, unspecified epilepsy type (H)  No recent seizures.  Continue current medications, follow-up with neurology.  Note given for driving, given that she has had no seizures since 2002, low risk for sudden incapacitation from seizures.  - HM2(CBC w/o Differential)    3. Other migraine without status migrainosus, not intractable  Stable continue current plan    4. Hypothyroidism due to acquired atrophy of thyroid  Continue levothyroxine  - Thyroid Stimulating Hormone (TSH)    5. Essential hypertension with goal blood pressure less than 140/90  Pressure controlled continue current medication    6. Stage 3 chronic kidney disease  Check labs  - Comprehensive Metabolic Panel    7. Anxiety disorder, unspecified type  Stable, on numerous medications per psychiatry    8. Pulmonary embolism with infarction (H)  Continue warfarin  - INR    9. CHRIS (obstructive sleep apnea)  Not using CPAP    10. Mixed hyperlipidemia  Continue statin  - Lipid Cascade    11. Hyperglycemia  - Glycosylated Hemoglobin A1c    12. Moderate major depression (H)  Generally stable, follow-up with psychiatry    13. DNR (do not resuscitate)  14. Advance care planning  We had a 20 minute discussion today.  She does have an advanced care directive but does not have this in clinic today.  Her  would be her medical decision-maker.  She is DNR we discussed this at length.  She is decisional in this regard.  She would be okay with short-term intubation would not want this long-term if there is no meaningful chance for recovery.    The patient's current medical problems were reviewed.    I have had an Advance Directives discussion with the patient.  The following high BMI interventions were performed this visit: encouragement to  exercise and lifestyle education regarding diet  The following health maintenance schedule was reviewed with the patient and provided in printed form in the after visit summary:   Health Maintenance   Topic Date Due     INFLUENZA VACCINE RULE BASED (1) 08/01/2018     MAMMOGRAM  01/03/2019     DEPRESSION FOLLOW UP  03/13/2019     PAP SMEAR  07/21/2020     ADVANCE DIRECTIVES DISCUSSED WITH PATIENT  10/27/2020     TD 18+ HE  08/14/2023     COLONOSCOPY  08/13/2024     TDAP ADULT ONE TIME DOSE  Completed        Subjective:   Chief Complaint: Valentina Olmedo is an 58 y.o. female here for an Annual Wellness visit.   HPI: This 58-year-old woman comes in for annual wellness visit.  Overall she has been doing okay.  Mood is been stable.  No recent hospitalizations.  No recent seizures.  Last seizure was around 2001 or 2002.  Migraines generally controlled.  Tolerating blood pressure medications.  Taking warfarin.  No chest pain no shortness of breath no pleuritic chest pain.  No leg swelling.  Energy level is been okay.  Not using CPAP for obstructive sleep apnea.    Review of Systems:  Please see above.  The rest of the review of systems are negative for all systems.    Patient Care Team:  Donald Rooney MD as PCP - General (Internal Medicine)  Dionne Olivas CMA as Clinic Care Coordination Care Guide (Clinic Care Coordination)  BELKIS Kellogg as  (Licensed Clinical )  Breana Puente MD as Physician (Psychiatry)  Patty Ellis as      Patient Active Problem List   Diagnosis     Epilepsy - Dr. 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 Simmons     Nicotine dependence, cigarettes, with other nicotine-induced disorders     Pulmonary embolism with infarction (H)     Morbid obesity with BMI of 45.0-49.9, adult (H)     CHRIS (obstructive sleep apnea)     DNR (do not resuscitate)     Moderate major depression (H)     Past Medical History:   Diagnosis Date     Anxiety      Arthritis      Borderline personality disorder      Cancer (H)      COPD (chronic obstructive pulmonary disease) (H)      Depression      Hyperlipidemia      Hypertension      Hyponatremia      Hypothyroidism      PTSD (post-traumatic stress disorder)      Recurrent otitis media      Seizure disorder (H)      Stroke (H)      Vertigo       Past Surgical History:   Procedure Laterality Date     ADRENALECTOMY Left      CARPAL TUNNEL RELEASE Bilateral      CRANIOTOMY FOR TEMPORAL LOBECTOMY Right     x3 for seizure     DILATION AND CURETTAGE OF UTERUS       LITHOTRIPSY       PARATHYROID GLAND SURGERY      resection of adenoma     REFRACTIVE SURGERY Bilateral      TONSILLECTOMY AND ADENOIDECTOMY       TOTAL THYROIDECTOMY       VAGINAL HYSTERECTOMY  ?      Family History   Problem Relation Age of Onset     Lung cancer Maternal Grandfather 76     COPD Mother      Other Father      estranged     Diabetes Sister      Depression Sister      Alcohol abuse Sister      Other Brother      Missing since 1992     Alcohol abuse Brother      No Medical Problems Sister      Half sister      Social History     Social History     Marital status:      Spouse name: N/A     Number of children: N/A     Years of education: N/A     Occupational History     Not on file.     Social History Main Topics     Smoking status: Current Every Day Smoker     Packs/day: 0.50     Years: 35.00     Types: Cigarettes     Start date: 6/10/1974     Smokeless tobacco: Never Used      Comment: smoking 1-2 a day     Alcohol use No     Drug use:  No     Sexual activity: Not Currently     Other Topics Concern     Not on file     Social History Narrative    ,  René.  René works at [employer].  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  No children.        Current Outpatient Prescriptions   Medication Sig Dispense Refill     albuterol (PROAIR HFA;PROVENTIL HFA;VENTOLIN HFA) 90 mcg/actuation inhaler Inhale 2 puffs every 4 (four) hours as needed for wheezing. 1 Inhaler 6     atorvastatin (LIPITOR) 20 MG tablet Take 1 tablet by mouth every evening for cholesterol 30 tablet 7     benztropine (COGENTIN) 1 MG tablet Take 1 tablet (1 mg total) by mouth daily. 30 tablet 11     carBAMazepine (TEGRETOL) 200 mg tablet Take 200 mg by mouth 3 (three) times a day. 2 tabs (400mg) q AM, 1.5 tabs (300mg) q PM and HS       diphenhydrAMINE (BENADRYL) 50 MG tablet Take 1 tablet (50 mg total) by mouth 2 (two) times a day as needed for sleep (anxiety or agitation). 60 tablet 11     DULoxetine (CYMBALTA) 60 MG capsule Take 2 capsules (120 mg total) by mouth daily. 60 capsule 11     EPINEPHrine 0.15 mg/0.15 mL atIn Inject 1 each into the shoulder, thigh, or buttocks.       ethotoin (PEGANONE) 250 mg Tab Take 1,000 mg by mouth 3 (three) times a day.       levothyroxine (SYNTHROID, LEVOTHROID) 175 MCG tablet Take 1 tablet by mouth daily 30 tablet 10     omeprazole (PRILOSEC) 20 MG capsule Take 1 capsule by mouth daily before breakfast (generic.Prilosec) 30 capsule 10     oxybutynin (DITROPAN XL) 10 MG ER tablet Take 1 tablet (10 mg total) by mouth daily. 30 tablet 5     polyethylene glycol (MIRALAX) 17 gram packet Take 17 g by mouth daily as needed.        QUEtiapine (SEROQUEL) 200 MG tablet Take 1 tablet (200 mg total) by mouth at bedtime (generic. Seroquel) 30 tablet 4     risperiDONE (RISPERDAL) 1 MG tablet Take 1 tablet (1 mg total) by mouth at bedtime. 30 tablet 2     senna (SENOKOT) 8.6 mg tablet Take 2 tablets by mouth 2 (two) times a day.  "      traZODone (DESYREL) 150 MG tablet Take 1 tablet (150 mg total) by mouth at bedtime. 30 tablet 2     VITAMIN D3 2,000 unit capsule Take 1 capsule by mouth daily (vitamin d3) 30 capsule 5     warfarin (COUMADIN) 5 MG tablet Take 2 tablet by mouth two days per week and take 1 and 1/2 tablet by mouth the other 5 days of the week 50 tablet 19     No current facility-administered medications for this visit.       Objective:   Vital Signs:   Visit Vitals     /84 (Patient Site: Left Arm, Patient Position: Sitting, Cuff Size: Adult Regular)     Pulse 86     Ht 5' 6\" (1.676 m)     Wt (!) 291 lb (132 kg)     SpO2 97%     BMI 46.97 kg/m2        VisionScreening:  No exam data present     PHYSICAL EXAM  HEENT exam is unremarkable  Neck supple no thyromegaly or nodule palpable  Lymphatic no cervical lymphadenopathy  Cardiovascular regular rate and rhythm no murmur gallop or rub  Pulmonary lungs are clear to auscultation bilaterally  Gastrointestinall abdomen soft nontender nondistended no organomegaly  Neurologic exam is non focal  Psychiatric pleasant, flat affect, no confusion or agitation    Assessment Results 9/13/2018   Activities of Daily Living 1 - Full function   Instrumental Activities of Daily Living 1 - Full function   Get Up and Go Score Less than 12 seconds   Mini Cog Total Score 3   Some recent data might be hidden     A Mini-Cog score of 0-2 suggests the possibility of dementia, score of 3-5 suggests no dementia    Identified Health Risks:     The patient was provided with suggestions to help her develop a healthy lifestyle.   She is at risk for lack of exercise and has been provided with information to increase physical activity for the benefit of her well-being.  The patient reports that she has difficulty with activities of daily living. I have asked that the patient make a follow up appointment in 12 weeks where this issue will be further evaluated and addressed.  The patient reports that she has " difficulty with instrumental activities of daily living.  She was provided with a list of local organizations that provide support services and advised to make a follow up appointment in 12 weeks to address this further.   The patient s PHQ-9 score is consistent with moderate depression.  She was provided with information regarding depression and was advised to schedule a follow up appointment in 12 weeks to further address this issue.  She is at risk for falling and has been provided with information to reduce the risk of falling at home.  Information regarding advance directives (living [place]), including where she can download the appropriate form, was provided to the patient via the AVS.

## 2021-06-20 NOTE — PROGRESS NOTES
Attempt 1-Care Guide called patient.  If this patient is returning our call please transfer to Dionne at ext. [phone]    Next outreach due: 9/11/18

## 2021-06-20 NOTE — PROGRESS NOTES
Patient has cut down to 1-2 cigarettes a day.  She is still struggling with affording groceries but they are managing with going to the food shelf.  She will look into a more affordable vehicle when the lease is up on her current one. Pays $600 a month for lease and insurance.  Will talk with her  about a budget and how she could get on the spend down for her insurance.    Next outreach due: 10/30/18

## 2021-06-20 NOTE — PROGRESS NOTES
"Mental Health Visit Note    9/13/2018    Start time: 1:00pm    Stop Time: 1:55pm  Session # 11    Valentina Olmedo is a 58 y.o. female is being seen today for   Chief Complaint   Patient presents with      Follow Up     Depression     lonliness with little social connection     Anxiety     ongoing worries about unpaid bills   .     New symptoms or complaints: depression related to lonliness with little social connection, ongoing worries about unpaid bills.    Functional Impairment:   Personal: 3  Family: 2  Work: 4  Social: 4    Clinical assessment of mental status: Patient's grooming is Well groomed, his attire is Appropriate, and his age appears Appears Stated. Behavior towards examiner is Cooperative, motor activity is Within normal and Tremors/Tics (shaking leg), and eye contact is Staring.  Patient's mood is Sad and anxious, and affect is Blunted and Flat.  Speech/language is Delayed/Hesitant, attention is Distractible, and concentration is Brief. Thought process is Slow, thought content is Within noraml and  Within normal.  Patient's orientation is X 3, memory is  Impairment and Recent, judgement is Impairment and Minimal, and estimated intelligence is Below Average. Demonstrated insight is Adequate while fund of knowledge is adequate.    Suicidal/Homicidal Ideation present: None reported.    Patient's impression of their current status:   Patient identifies mood as depressed. \"We're probably going to be moving to [place] because we don't have friends here.\"  Processes feelings about family friends who have not made themselves available for several weeks.  Processes feelings about financial stress, still not resolved as they have unpaid bills.    Patient reports mixed follow up with therapy homework:  Explored cheaper utilities with her  but learned that they are on a two year contract. Contacted neurology clinic regarding possible financial assistance for her no-show bill.  Continues to make use of " "supports including the food shelf to alleviate financial and thus mental health stressors.  Reports she \"sometimes\" got up and dressed, with a couple excursions into the community for appointments and shopping but no other walking.    Therapist impression of patients current state: Patient continues to show inability to manage daily finances with resulting stress that does affect relationship and mental health.  She is incapable or unwilling to take steps to change this or assert more responsibility and getting the bills paid, depending instead on her  to do so.  Low activity and cognitive functioning remain a concern, but patient does not demonstrate danger to self.    Processed negative cognitions, reinforced strengths, validated patient efforts and feelings.  Discussed that her friends have not actually told her that they do not want to be friends, but have been exceptionally busy.  Practiced validation \"it is not about me\".  Discussed community supports patient and spouse have in place here ([facility], spouse's [employer] worker, subsidized housing, therapists) and the value of learning what would be available to them in [place] before a move there.     Type of psychotherapeutic technique provided: Client centered, Solution-focused and CBT      Progress toward short term goals: Mixed progress: Explored cheaper utilities with her  but learned that they are on a two year contract. Contacted neurology clinic regarding possible financial assistance for her no-show bill.  Continues to make use of supports including the food shelf to alleviate financial and thus mental health stressors.  Reports she \"sometimes\" got up and dressed, with a couple excursions into the community for appointments and shopping but no other walking.    Review of long term goals: Not done at today's visit and Date of last review 8/29/2018    Diagnosis:   1. Depression, major, recurrent, moderate (H)    2. Generalized anxiety disorder " "   3. Borderline personality disorder    4. Cognitive deficits        Plan and Follow up: Patient will return for follow up in two weeks.  She will talk to  CM, [facility] worker to see if there is drop in center for SPMI in their area.  Will follow through with neurology clinic regarding possible financial assistance for her no-show bill.  If spouse receives Social Security back payment, we will put money away as a \"cushion\" against distress of hard times.  She will continue to make use of supports including the food shelf to alleviate financial and thus mental health stressors.  She will continue to get up and dressed daily, be with her spouse in the living room and gentle walking and excursions into the community.    Discharge Criteria/Planning: Client has chronic symptoms and ongoing therapy for maintenance stability recommended.    Loretta BRINK, LICSW  9/13/2018  "

## 2021-06-20 NOTE — LETTER
Letter by Reema Wallis CNP at      Author: Reema Wallis CNP Service: -- Author Type: --    Filed:  Encounter Date: 4/16/2020 Status: (Other)         April 16, 2020     Patient: Valentina Olmedo   YOB: 1960   Date of Visit: 4/16/2020       To Whom It May Concern:    It is my medical opinion that Valentina Olmedo requires a TLSO back brace for acute compression fracture L2 and L5 to reduce pain by restricting mobility of the trunk as well as facilitate healing of spine fracture.  Recommend wearing the back brace for 2 to 3 months while fracture is healing.  Can start to wean off wearing the back brace at that time if the fracture is stable and patient symptoms are improving.    If you have any questions or concerns, please don't hesitate to call.    Sincerely,        Electronically signed by Reema Wallis CNP

## 2021-06-20 NOTE — LETTER
Letter by Donald Rooney MD at      Author: Donald Rooney MD Service: -- Author Type: --    Filed:  Encounter Date: 9/25/2020 Status: (Other)         Valentina Olmedo  [address]  [facility] [address]             September 25, 2020         Dear Ms. Juan Ramonjuan diego,    Below are the results from your recent visit:    Resulted Orders   HM2(CBC w/o Differential)   Result Value Ref Range    WBC 3.2 (L) 4.0 - 11.0 thou/uL    RBC 4.65 3.80 - 5.40 mill/uL    Hemoglobin 14.0 12.0 - 16.0 g/dL    Hematocrit 41.5 35.0 - 47.0 %    MCV 89 80 - 100 fL    MCH 30.0 27.0 - 34.0 pg    MCHC 33.7 32.0 - 36.0 g/dL    RDW 11.7 11.0 - 14.5 %    Platelets 225 140 - 440 thou/uL    MPV 8.1 7.0 - 10.0 fL   Comprehensive Metabolic Panel   Result Value Ref Range    Sodium 142 136 - 145 mmol/L    Potassium 3.9 3.5 - 5.0 mmol/L    Chloride 110 (H) 98 - 107 mmol/L    CO2 23 22 - 31 mmol/L    Anion Gap, Calculation 9 5 - 18 mmol/L    Glucose 108 70 - 125 mg/dL    BUN 6 (L) 8 - 22 mg/dL    Creatinine 1.21 (H) 0.60 - 1.10 mg/dL    GFR MDRD Af Amer 55 (L) >60 mL/min/1.73m2    GFR MDRD Non Af Amer 46 (L) >60 mL/min/1.73m2    Bilirubin, Total 0.2 0.0 - 1.0 mg/dL    Calcium 8.7 8.5 - 10.5 mg/dL    Protein, Total 6.8 6.0 - 8.0 g/dL    Albumin 3.4 (L) 3.5 - 5.0 g/dL    Alkaline Phosphatase 106 45 - 120 U/L    AST 23 0 - 40 U/L    ALT 28 0 - 45 U/L    Narrative    Fasting Glucose reference range is 70-99 mg/dL per  American Diabetes Association (ADA) guidelines.   Thyroid Cascade   Result Value Ref Range    TSH 4.79 0.30 - 5.00 uIU/mL       Labs okay/stable, excellent    Please call with questions or contact us using MyChart.    Sincerely,        Electronically signed by Donald Rooney MD

## 2021-06-20 NOTE — PROGRESS NOTES
Mental Health Visit Note    8/29/2018    Start time: 1:00pm    Stop Time: 1:55pm  Session # 10    Valentina Olmedo is a 58 y.o. female is being seen today for    Chief Complaint   Patient presents with      Follow Up     Depression     Anxiety   .     New symptoms or complaints: None    Functional Impairment:   Personal: 3  Family: 2  Work: 4  Social: 4    Clinical assessment of mental status: Patient's grooming is Well groomed, his attire is Appropriate, and his age appears Appears Stated. Behavior towards examiner is Cooperative, motor activity is Within normal and Tremors/Tics (shaking leg), and eye contact is Staring.  Patient's mood is Anxious, and affect is Blunted, Irritable and Flat.  Speech/language is Delayed/Hesitant, attention is Distractible, and concentration is Brief. Thought process is Slow, thought content is Within noraml and  Within normal.  Patient's orientation is X 3, memory is  Impairment and Recent, judgement is Impairment and Minimal, and estimated intelligence is Below Average. Demonstrated insight is Adequate while fund of knowledge is adequate.    Suicidal/Homicidal Ideation present: None reported.    Patient's impression of their current status:   Reports mood is anxious in the context of multiple bills that were not paid.  She blames this on her spouse but admits she is made little effort to try to organize getting the bills paid.    Patient identifies minimal follow up on therapy homework: She is not able to answer the question about whether or not she is contact the neurology clinic regarding financial assistance for her no-show bill.  Has not been able to put any money away as a cushion against distress/financial hard times.  She does continue to make use of food shelf to alleviate financial stress and thus mental health stressors.  States that she gets up and dressed about half the time with only rare excursions into the community for appointments and grocery shopping.    Therapist  impression of patients current state: Patient continues stressed of her financial situation despite the help of subsidize housing.  Patient and her spouse continue to demonstrate poor judgment that evidently leads to financial stress that affects patient's mental health.  Very little assertiveness in changing her situation.  Low activity and cognitive functioning remain a concern, but patient does not demonstrate risk to self.    Processed negative cognitions, reinforced strengths, validated patient efforts and feelings. Brainstormed ways to reduce financial stress. Patient admits to excessive utility bills due to contracts that she and her spouse have committed to: [employer] $106, [employer] $176 for two Iphones that they use mostly as phones and not very often.  Discussed what the couple might do with less outlay, patient able to identify that they could put some savings away so that finances would not be so stressful. Patient identfies alternative company for cell phone ([employer]).     Type of psychotherapeutic technique provided: Client centered, Solution-focused and CBT     Progress toward short term goals: Mixed Progress: She is not able to answer the question about whether or not she is contact the neurology clinic regarding financial assistance for her no-show bill.  Has not been able to put any money away as a cushion against distress/financial hard times.  She does continue to make use of food shelf to alleviate financial stress and thus mental health stressors.  States that she gets up and dressed about half the time with only rare excursions into the community for appointments and grocery shopping.    Review of long term goals: Long-term goals reviewed   and Treatment Plan updated    Diagnosis:   1. Depression, major, recurrent, moderate (H)    2. Generalized anxiety disorder    3. Borderline personality disorder    4. Cognitive deficits        Plan and Follow up: Patient will return for follow up in  "two weeks. She will explore options for alternative cheaper utilities. She will contact neurology clinic regarding possible financial assistance for her no-show bill.  If spouse receives SS back payment, they will put some money away as a \"cushion\" against distress of hard times.  She will continue the make use of supports including the food shelf to alleviate financial and thus mental health stressors.  She will continue to get up daily, get dressed, and be with her spouse to the living room and gentle walking and excursions into the community.    Discharge Criteria/Planning: Client has chronic symptoms and ongoing therapy for maintenance stability recommended.    Loretta Barney MSW, LICSW  8/29/2018  "

## 2021-06-20 NOTE — PROGRESS NOTES
Attempt 2-Care Guide called patient.  If this patient is returning our call please transfer to Dionne at ext. [phone].    Next outreach due: 9/26/18
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
## 2021-06-21 NOTE — LETTER
Letter by Loretta Barney LICSW at      Author: Loretta Barney LICSW Service: -- Author Type: --    Filed:  Encounter Date: 4/21/2021 Status: (Other)       April 21, 2021              Valentina Olmedo  [address]  [facility] [facility] [address]      Dear Ms. Olmedo:    Enclosed is the Release of Information that we need for your .  Please complete their information, sign, date and return in the enclosed postage paid envelope.    If you have any questions, please contact the clinic at [phone].    Thank you.      Sincerely,        Electronically signed by BELKIS Kellogg   [facility]  [address]  [place]  [address]  [phone]       
The patient is a 78y Male complaining of fall.

## 2021-06-21 NOTE — PROGRESS NOTES
"  Illness Management and Recovery (IM&R) Group Note    Date of Service:11/26/2018 Start Time: 100  Stop Time: 2:00 Total Time: 60 minutes    Frequency: Monday    Type of the visit:  Group therapy     Session: 2    N=6    Facilitator(s): CUBA Mckinnon -BELKIS; [facility]     Subjective: Patient was able to participate and benefit from treatment as evidenced by their  verbal expression and understanding of idea discussed.     Clinical assessment of mental status:   Valentina Olmedo presented on time.   She was oriented x3, open and cooperative, and dressed appropriately for this session and weather. Her memory was Mild .  Her speech was  Within normal.  Language was appropriate.  Concentration and focus is Within normal. Psychosis is not noted or reported. She reports her mood is Anxious.  Affect is congruent with speech and is Congruent w/content of speech.  Fund of knowledge is adequate. Insight is adequate for therapy.     New Symptoms or Complaints: \" no much going on. it is cold\"    Reason(s) Patient is participating in the group: The IM&R group session was necessary for the care of the patient to address how symptoms of mental illness and or/addiction impact the patient s daily functioning and to learn healthy coping strategies.      No barriers to learning evidenced.     Progress toward short-term goals: The patient completed the weekly home assignment at (use lurkin scale score): 2/4- did some of her weekly activities. Still wants to complete her goal of moving around and increasing social interactions. She is planning to see other in the building for socialization or taking short walk daily.     Patient's impression of their current status:The patient indicated readiness to learn by the patient s choice to attend group.     Therapist impression of patients' current state: This 58 y.o. White or  female presents participated actively in today's IM&R group with following format:     [X Writer " socialized informally and identified any major problem  [X] Reviewed the previous session:  Weekly goal review  [X] Followed up on the weekly goals for each member using the Likert scale for goal achievement.   [X] Reinforced small success toward the weekly goal and discussed reported barriers  [X] Set agenda for current session  [X] Introduced the new topic on: New goal for the week   [X] Used education/interactive strategies of inviting members to take tourn reading the new material.  [X] Used Cognitive Behavioral therapy skills as needed  [X]  Used Motivational Interviewing/ reflection/ elicit reactions/ questions, checked for understanding, and summarized today's content of material.  [X] Agreed on new weekly home assignment  [X] Announced the topic for the next session  Review of long term goals: Weekly goal review    Type of psychotherapeutic technique(s) provided: CBT, Education and Motivational Interviewing.     The patient was given an opportunity to ask questions and participate in the group discussion. The patient exhibited individual understanding by asking relevant questions and making appropriate comments to other group members.     Assessment tools used today include: How do you feel today?,Likert scale for goal achievement: 0= did nothing; 1. Thought about it; 2: did some of it; 3. Did most of it; 4 completed it.     Diagnosis:  1. Depression, major, recurrent, moderate (H)    2. Generalized anxiety disorder      No indication of SI, plan and/or means.     Psychotherapeutic Techniques: A cognitive behavior, Motivational Interviewing, and Education.     Plan and Follow-up :Continue to attend groups to address mental health symptoms/ limitations and work on recovery goal.    Discharge Criteria Planning: Decrease of mental health symptoms, reduce of relapse and hospitalizations.    Provider: Performed and documented by ARTURO Mckinnon; [facility] 11/26/2018

## 2021-06-21 NOTE — PROGRESS NOTES
Patient is writing all of her appointments in her calendar.  She continues to smoke a little, she can go a few days without smoking at all.   She is still using the food shelf and care guide mailed information for [employer].    Next outreach due: 12/7/18

## 2021-06-21 NOTE — PROGRESS NOTES
Substance use:none  :Patty Ellis  [facility] worker: none  Living Situation: with  in an apartment  Therapist: Loretta Barney  TRAVIS's: none  PCP:Dr. Rooney, saw a few months ago    Depression: Not really  Anxiety: Not much  How are things going: Patient states she is doing well but still having sleeping issues.    [place] : No activity    Correct pharmacy verified with patient and confirmed in snapshot? [x] yes []no    Charge captured ? [x] yes  [] no    Medications Phoned  to Pharmacy [] yes [x]no  Name of Pharmacist:  List Medications, including dose, quantity and instructions      Medication Prescriptions given to patient   [] yes  [x] no   List the name of the drug the prescription was written for.       Medications ordered this visit were e-scribed.  Verified by order class [x] yes  [] no    Medication changes or discontinuations were communicated to patient's pharmacy: [] yes  [x] no    UA collected [] yes  [x] no    [place] Prescription Monitoring Program Reviewed? [x] yes  [] no    Referrals were made to:  None    Future appointment was made: [x] yes  [] no    Dictation completed at time of chart check: [] yes  [x] no    I have checked the documentation for today s encounters and the above information has been reviewed and completed.

## 2021-06-21 NOTE — PROGRESS NOTES
"  Illness Management and Recovery (IM&R) Group Note    Date of Service: 11/19/2018 Start Time: 100 Stop Time: 2:00 Total Time: 60 minutes    Frequency: Monday    Type of the visit:  Group therapy     Session: 1 in this group    N=5    Facilitator(s): CUBA Mckinnon -BELKIS; [facility]     Subjective: Patient was able to participate and benefit from treatment as evidenced by their verbal expression and understanding of idea discussed.     Clinical assessment of mental status:   Valentina Olmedo presented on time.   She was oriented x3, open and cooperative, and dressed appropriately for this session and weather. Her memory was Normal cognitive functioning.  Her speech was soft.  Language was appropriate.  Concentration and focus is within normal. Psychosis is not noted or reported. She reports her mood is Anxious at this first group session. Affect is congruent with speech and is Anxious.  Fund of knowledge is adequate. Insight is adequate for therapy.     New Symptoms or Complaints: First session in the group. patient stated that she wants\" to get out to feel better\"    Reason(s) Patient is participating in the group: The IM&R group session was necessary for the care of the patient to address how symptoms of mental illness and or/addiction impact the patient s daily functioning and to learn healthy coping strategies.      No barriers to learning evidenced.     Progress toward short-term goals: The patient completed the weekly home assignment at (use Likert scale score): No weekly goal yet. Today is the patient's first session in the group.    Patient's impression of their current status: The patient indicated readiness to learn by the patient s choice to attend group.     Therapist impression of patients' current state: This 58 y.o. White or  female presents participated actively in today's IM&R group with following format:      [X Writer socialized informally and identified any major problem  [X] Reviewed " the previous session/checked everyone s weekly home assignment.   [X] Followed up on the weekly goals for each member using the Likert scale for goal achievement.   [X] Reinforced small success toward the weekly goal and discussed reported barriers  [X] Set agenda for current session  [X] Introduced the new topic on: Patient was introduced to the group members and the materials.  [X] Used education/interactive strategies of inviting members to take turn reading the new material.  [X] Used Cognitive Behavioral therapy skills as needed  [X] Used Motivational Interviewing/ reflection/ elicit reactions/ questions, checked for understanding, and summarized today's content of material.  [X] Agreed on new weekly home assignment  [X] Announced the topic for the next session  Review of long term goals: Patient will read the material provided which will allow her to define her own recovery.    Type of psychotherapeutic technique(s) provided: CBT, Education and Motivational Interviewing.     The patient was given an opportunity to ask questions and participate in the group discussion. The patient exhibited individual understanding by asking relevant questions and making appropriate comments to other group members.     Assessment tools used today include: How do you feel today? Likert scale for goal achievement: 0= did nothing; 1. Thought about it; 2: did some of it; 3. Did most of it; 4 completed it.     Diagnosis:  1. Depression, major, recurrent, moderate     2. Generalized anxiety disorder      Psychotherapeutic Techniques: A cognitive behavior, Motivational Interviewing, and Education.     Plan and Follow-up: Continue to attend groups to address mental health symptoms and/or addiction issues and work on recovery goal.    Discharge Criteria Planning: Decrease of mental health symptoms, reduce of relapse and hospitalizations.    Provider: Performed and documented by ARTURO Mckinnon; [facility] 11/19/2018

## 2021-06-21 NOTE — PROGRESS NOTES
"Date of Service:  2018    Name:  Valentina Olmedo  :  1960  MRN:  [id number]    HPI:   Valentina Olmedo is a 58 y.o. female with neurocognitive deficits (neuropsych testing by [facility], excerpt below), borderline personality disorder, major depressive disorder,   seizure disorder, CHRIS, hypothyroid, on warfarin for PE. She is accompanied by Jose F Milan CM, \"Patty\".    Last seen in Aug.  She did not make sleep clinic appointment.  Does not want to use CPAP.     Therapist- she has good alliance with Loretta Barney [facility].    Appt w/Dr. Rooney on 18.  They discussed advanced directive and DNR.    ROS: Has responded well to decreased Risperdal and trazodone doses.  More alert, cognition improved.  Occasional falls, fell into wall today.  Problems getting to sleep (since daylight savings).  Denies EDS.    --------------------------------------------------------------------------------------------------------  Neuropsych testing by Irais De La Cruz PhD from [facility] (3/20/18).  Note excerpt:  \"Implications/recommendations:  \"Her current test performance demonstrates significant decline relative to last evaluation in .    The possibility of significant medication side effects secondary to her anti-epileptic and psychotropic drug regime should be explored, as well possible onset of dementia.   Follow-up by neuropsychology should be considered if a medication adjustment results in some improvement in her presentation\"  ---------------------------------------------------------------------------------------------------------------------------      Social  Lives in 1 BR apartment in [place] on the . Uses elevator.  They have 3 cats-Yulisa Camarillo Parker.    Harry Ellis [phone]  Weekly Med set- up.      Medical  Seizure disorder, Neurologist Praveena Benitez, [place] [facility]    CHRIS, Dx'd at [facility] by Benjie Allan MD 12/10/2013, \"AutoCPAP 5-20 cms of water is " "recommended, while patient sleeps\".  Sleep study done at [facility] 4/2015- \"Pt did not have any significant sleep apnea during her test\", 4/6/15 Fernando Montaño MD     Hypothyroid    On warfarin for [facility].E, April 2017       Medications:       Current Outpatient Prescriptions on File Prior to Visit   Medication Sig Dispense Refill     albuterol (PROAIR HFA;PROVENTIL HFA;VENTOLIN HFA) 90 mcg/actuation inhaler Inhale 2 puffs every 4 (four) hours as needed for wheezing. 1 Inhaler 6     atorvastatin (LIPITOR) 20 MG tablet Take 1 tablet by mouth every evening for cholesterol 30 tablet 7     benztropine (COGENTIN) 1 MG tablet Take 1 tablet by mouth daily. 30 tablet 0     carBAMazepine (TEGRETOL) 200 mg tablet Take 200 mg by mouth 3 (three) times a day. 2 tabs (400mg) q AM, 1.5 tabs (300mg) q PM and HS       ethotoin (PEGANONE) 250 mg Tab Take 1,000 mg by mouth 3 (three) times a day.       levothyroxine (SYNTHROID, LEVOTHROID) 175 MCG tablet Take 1 tablet by mouth daily 30 tablet 10     omeprazole (PRILOSEC) 20 MG capsule Take 1 capsule by mouth daily before breakfast (generic.Prilosec) 30 capsule 10     oxybutynin (DITROPAN XL) 10 MG ER tablet Take 1 tablet (10 mg total) by mouth daily. 30 tablet 5     senna (SENOKOT) 8.6 mg tablet Take 2 tablets by mouth 2 (two) times a day.       VITAMIN D3 2,000 unit capsule Take 1 capsule by mouth daily (vitamin d3) 30 capsule 10     warfarin (COUMADIN) 5 MG tablet Take 2 tablet by mouth two days per week and take 1 and 1/2 tablet by mouth the other 5 days of the week 50 tablet 19     [DISCONTINUED] diphenhydrAMINE (BENADRYL) 50 MG tablet Take 1 tablet (50 mg total) by mouth 2 (two) times a day as needed for sleep (anxiety or agitation). 60 tablet 11     [DISCONTINUED] DULoxetine (CYMBALTA) 60 MG capsule Take 2 capsules (120 mg total) by mouth daily. 60 capsule 0     [DISCONTINUED] QUEtiapine (SEROQUEL) 200 MG tablet Take 1 tablet (200 mg total) by mouth at bedtime (generic. Seroquel) 30 tablet " 4     [DISCONTINUED] risperiDONE (RISPERDAL) 1 MG tablet Take 1 tablet (1 mg total) by mouth at bedtime. 30 tablet 2     [DISCONTINUED] traZODone (DESYREL) 150 MG tablet Take 1 tablet (150 mg total) by mouth at bedtime. 30 tablet 2     EPINEPHrine 0.15 mg/0.15 mL atIn Inject 1 each into the shoulder, thigh, or buttocks.       polyethylene glycol (MIRALAX) 17 gram packet Take 17 g by mouth daily as needed.        No current facility-administered medications on file prior to visit.           Associated Clinical Documents:       Notes reviewed in EPIC and [facility] including: medication reconciliation, nurses's notes, progress notes, recent labs, PMH, and OSH records.    ROS:       10 point ROS was negative except for the items listed in HPI.        MSE:      Vital signs: refer to nursing notes from today.   Alert & oriented x 3.  Smiles, improved eye contact  Appearance: Appears stated age, casually dressed.  Speech: Slow rate, rhythm and tone.  Gait: Normal.  Musculoskeletal: Psychomotor slowing, no abnormal movements.  Mood/Affect: brighter  Thought Process: circumferential  Thought Content: Occ passive SI, no plan. No homicide ideation.  Associations: Intact, no delusions.  Perceptions: See HPI  Memory: recent and remote memory intact.  Attention span and concentration: Fair  Language: Intact.  Fund of Knowledge: Normal.  Insight and Judgement: Fair    Impression:        1. Neurocognitive deficits-improving.  2. major depressive disorder, recurrent, currently in remission.   3. CHRIS-declines sleep appointment, does not want CPAP.  4.  Nicotine addiction-cut back from using 6>1 cigarette daily.  Tried gum in the past but did not park it in cheek.      Plan:       1.  Decrease Risperdal to 0.5 mg q hs  2.  Decrease Benadryl to 25mg q hs  3. Continue other meds, NO appt 1 mo, MD appt 2-3 mos.  4. Continue  Therapy  5. NRT gum      3 additional minutes spent discussing the patient's tobacco addiction, problems that come from  that, and ways she can reduce her smoking.      Total Time:      40 minutes      Coordination of Care:       40 Minutes spent on this visit with >50% time spent on coordination of care with staff, educating patient and family about diagnosis, treatment options, risks, benefits and side effects of medications, and providing supportive therapy.  Time also spent on reviewing  EHR, documentation and entering orders.      This dictation was completed with speech recognition software and there may be unintended word substitutions.    -----------------------------------

## 2021-06-21 NOTE — PROGRESS NOTES
"Mental Health Visit Note    11/8/2018    Start time: 1:00pm    Stop Time: 1:55pm  Session # 12    Valentina Olmedo is a 58 y.o. female is being seen today for   Chief Complaint   Patient presents with      Follow Up     Depression     Anxiety   .     New symptoms or complaints: None.    Functional Impairment:   Personal: 3  Family: 2  Work: 4  Social: 4    Clinical assessment of mental status:  Patient's grooming is Well groomed, his attire is Appropriate, and his age appears Appears Stated. Behavior towards examiner is Cooperative, motor activity is Within normal and Tremors/Tics (shaking leg), and eye contact is Staring.  Patient's mood is Euthymic, and affect is Blunted.  Speech/language is Delayed/Hesitant, attention is Distractible, and concentration is Brief. Thought process is Slow, thought content is Within noraml and  Within normal.  Patient's orientation is X 3, memory is  Impairment and Recent, judgement is Impairment and Minimal, and estimated intelligence is Below Average. Demonstrated insight is Adequate while fund of knowledge is adequate.    Suicidal/Homicidal Ideation present: None reported.    Patient's impression of their current status:   Patient reports mood \"OK\" mood, following good news of receiving extra back payment from [employer].  They are able to do some extra grocery shopping with this money and have held onto the rest of it so far.    Patient reports mixed follow up with therapy homework: Made use of food shelf to alleviate financial stress.  Met with  CM and discussed lack of social support. Some excursions into the community for grocery shopping, 's appointments. Did not walk at [facility]. Did not investigate community centers walking track. Made attempts to get up, dressed, and spend time with spouse in living room instead of bedroom \"just a few times\".     Therapist impression of patients current state: Patient's mood clearly lightened in the context of having " "more financial resources at this time.  She would clearly benefit from having better financial stability.  She is maintaining herself in the community without emergency room or mental health admissions.  She is now had a couple of years of stability following her mental health commitment.  She might still benefit from a referral to a day program but will try out the group in the clinic to see if this can decrease isolation and increase overall motivation.    Processed negative cognitions, reinforce strengths, validated patient efforts and feelings.  Discussed again benefits of increased social contact to mental health.  Patient is open to referral to illness and recovery group here at [facility] outpatient mental health clinic.  Problem-solved uses for overpayment that will reduce stress and anxiety in the future: patient identifies options to pay off current bills, buy shaver for spouse and necessities, put some away for emergencies. Discussed similar practical uses of back-payment if her spouse receives same.    Type of psychotherapeutic technique provided: Insight oriented, Client centered and Solution-focused      Progress toward short term goals: Mixed progress: Made use of food shelf to alleviate financial stress.  Met with  CM and discussed lack of social support. Some excursions into the community for grocery shopping, 's appointments. Did not walk at [employer]. Did not investigate community centers walking track. Made attempts to get up, dressed, and spend time with spouse in living room instead of bedroom \"just a few times\".     Review of long term goals: Not done at today's visit and Date of last review 8/29/2018    Diagnosis:   1. Depression, major, recurrent, moderate (H)    2. Generalized anxiety disorder    3. Borderline personality disorder (H)    4. Cognitive deficits        Plan and Follow up: Patient will return for follow up in four weeks. She will begin  Illness and Recovery " group here at [facility]. She will use SSDI overpayment to pay off current bills, buy shaver for spouse and necessities, put some away for emergencies.  She will walk at [facility] with spouse at least once.  She will continue efforts to get up and dressed daily, be with her spouse in the living room instead of the bedroom, attempt gentle walking and excursions into the community.  daily, be with her spouse in the living room instead of the bedroom, attempt gentle walking and excursions into the community.    Discharge Criteria/Planning: Client has chronic symptoms and ongoing therapy for maintenance stability recommended.    Loretta Barney MSW, LICSW  11/8/2018

## 2021-06-22 ENCOUNTER — COMMUNICATION - HEALTHEAST (OUTPATIENT)
Dept: ANTICOAGULATION | Facility: CLINIC | Age: 61
End: 2021-06-22

## 2021-06-22 ENCOUNTER — AMBULATORY - HEALTHEAST (OUTPATIENT)
Dept: LAB | Facility: CLINIC | Age: 61
End: 2021-06-22

## 2021-06-22 DIAGNOSIS — Z86.711 HISTORY OF PULMONARY EMBOLISM: ICD-10-CM

## 2021-06-22 LAB — INR PPP: 1.5 (ref 0.9–1.1)

## 2021-06-22 NOTE — PROGRESS NOTES
Rib pain:   Patient states that she is still having a lot of pain from her fall, she is spending a lot of time in bed.  She saw Dr Garcia on 12/26 at that time he gave her Percocet #15 which she states she didn't get. Care guide did let patient know that it was electronically sent to [facility] in [place] on 12/26 and should be available to  if needed.    Smoking:  Patient is not smoking much lately due to the rib fracture. Unsure of how many a day at this time.    Next outreach due: 1/14/19

## 2021-06-22 NOTE — PROGRESS NOTES
HPI: RN note rev'd.  Med changes made at 11/6/18 appointment:  1.  Decrease Risperdal to 0.5 mg q hs  2.  Decrease Benadryl to 25mg q hs    Impression: Insomnia possibly r/t to decreased med doses.    Plan:   RN to notify pt, CM and home nurse about med change-  Increase trazodone to 200mg q hs

## 2021-06-22 NOTE — PROGRESS NOTES
"  Illness Management and Recovery (IM&R) Group Note    Date of Service:12/3/2018 Start Time: 100  Stop Time: 2:00 Total Time: 60 minutes    Frequency: Monday    Type of the visit:  Group therapy     Session: 3    N=5    Facilitator(s): CUBA Mckinnon; [facility]     Subjective: Patient was able to participate and benefit from treatment as evidenced by their  verbal expression and understanding of idea discussed.     Clinical assessment of mental status:   Valentina Olmedo presented on time.   She was oriented x3, open and cooperative, and dressed appropriately for this session and weather. Her memory was Mild .  Her speech was  Soft.  Language was appropriate.  Concentration and focus is Within normal. Psychosis is not noted or reported. She reports her mood is Congruent w/content of speech.  Affect is congruent with speech and is Depressed.  Fund of knowledge is adequate. Insight is adequate for therapy.     New Symptoms or Complaints: \"  I feel better when I go out. I should keep my weekly goal    Reason(s) Patient is participating in the group: The IM&R group session was necessary for the care of the patient to address how symptoms of mental illness and or/addiction impact the patient s daily functioning and to learn healthy coping strategies.      No barriers to learning evidenced.     Progress toward short-term goals: The patient completed the weekly home assignment at (use likert scale score):2/4 went outdoor twice since last session.    Patient's impression of their current status:The patient indicated readiness to learn by the patient s choice to attend group.     Therapist impression of patients' current state: This 58 y.o. White or  female presents participated actively in today's IM&R group with following format:     [X Writer socialized informally and identified any major problem  [X] Reviewed the previous session: weekly goal review  [X] Followed up on the weekly goals for each member " using the Likert scale for goal achievement.   [X] Reinforced small success toward the weekly goal and discussed reported barriers  [X] Set agenda for current session  [X] Introduced the new topic on: module 5 , managing stress   [X] Used education/interactive strategies of inviting members to take tourn reading the new material.  [X] Used Cognitive Behavioral therapy skills as needed  [X]  Used Motivational Interviewing/ reflection/ elicit reactions/ questions, checked for understanding, and summarized today's content of material.  [X] Agreed on new weekly home assignment  [X] Announced the topic for the next session    Review of long term goals: Date of last review:     Type of psychotherapeutic technique(s) provided: CBT, Education and Motivational Interviewing.     The patient was given an opportunity to ask questions and participate in the group discussion. The patient exhibited individual understanding by asking relevant questions and making appropriate comments to other group members.     Assessment tools used today include: How do you feel today?,Likert scale for goal achievement: 0= did nothing; 1. Thought about it; 2: did some of it; 3. Did most of it; 4 completed it.     Diagnosis:  1. Depression, major, recurrent, moderate (H)    2. Generalized anxiety disorder      No indication of SI, plan and/or means.     Psychotherapeutic Techniques: A cognitive behavior, Motivational Interviewing, and Education.     Plan and Follow-up :Continue to attend groups and work on recovery goal.    Discharge Criteria Planning: Decrease  health symptoms and hospitalizations.    Provider: Performed and documented by ARTURO Mckinnon; [facility] 12/3/2018
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
## 2021-06-22 NOTE — PROGRESS NOTES
"  Illness Management and Recovery (IM&R) Group Note    Date of Service:12/31/2018 Start Time: 100  Stop Time: 2:00 Total Time: 60 minutes    Frequency: Monday    Type of the visit:  Group therapy     Session: 5    N=7    Facilitator(s): CUBA Mckinnon; [facility]     Subjective: Patient was able to participate and benefit from treatment as evidenced by their  verbal expression and understanding of idea discussed.     Clinical assessment of mental status:   Valentina Olmedo presented on time.   She was oriented x3, open and cooperative, and dressed appropriately for this session and weather. Her memory was Mild .  Her speech was  Soft.  Language was appropriate.  Concentration and focus is Within normal. Psychosis is not noted or reported. She reports her mood is Depressed.  Affect is congruent with speech and is Congruent w/content of speech.  Fund of knowledge is adequate. Insight is adequate for therapy.     New Symptoms or Complaints: \" feeling down due to recent fall. I am sore and I want to stay home on bed\"    Reason(s) Patient is participating in the group: The IM&R group session was necessary for the care of the patient to address how symptoms of mental illness and or/addiction impact the patient s daily functioning and to learn healthy coping strategies.      No barriers to learning evidenced.     Progress toward short-term goals: The patient completed the weekly home assignment at (use lurkin scale score):2/4- did not much due to the fall. Has been sore and went to see her PCP.     Patient's impression of their current status:The patient indicated readiness to learn by the patient s choice to attend group.     Therapist impression of patients' current state: This 58 y.o. White or  female presents participated actively in today's IM&R group with following format:     [X Writer socialized informally and identified any major problem  [X] Reviewed the previous session: Review Module 8: session " 2 and 3 : coping with stress  [X] Followed up on the weekly goals for each member using the Likert scale for goal achievement.   [X] Reinforced small success toward the weekly goal and discussed reported barriers  [X] Set agenda for current session  [X] Introduced the new topic on: The most important core values, borrowed from [employer]    [X] Used education/interactive strategies of inviting members to take tourn reading the new material.  [X] Used Cognitive Behavioral therapy skills as needed  [X]  Used Motivational Interviewing/ reflection/ elicit reactions/ questions, checked for understanding, and summarized today's content of material.  [X] Agreed on new weekly home assignment  [X] Announced the topic for the next session    Review of long term goals: Weekly review: Increase socialization     Type of psychotherapeutic technique(s) provided: CBT, Education and Motivational Interviewing.     The patient was given an opportunity to ask questions and participate in the group discussion. The patient exhibited individual understanding by asking relevant questions and making appropriate comments to other group members.     Assessment tools used today include: How do you feel today?,Likert scale for goal achievement: 0= did nothing; 1. Thought about it; 2: did some of it; 3. Did most of it; 4 completed it.     Diagnosis:  1. Moderate major depression (H)    2. Generalized anxiety disorder    3. Chronic post-traumatic stress disorder (PTSD)      No indication of SI, plan and/or means.     Psychotherapeutic Techniques: A cognitive behavior, Motivational Interviewing, and Education.     Plan and Follow-up :Continue to attend groups and work on recovery goal.    Discharge Criteria Planning: Decrease  health symptoms and hospitalizations.    Provider: Performed and documented by ARTURO Mckinnon; [facility] 12/31/2018

## 2021-06-22 NOTE — PROGRESS NOTES
[facility] Follow Up Note    Valentina Olmedo   58 y.o. female    Date of Visit: 12/26/2018    Chief Complaint   Patient presents with     Pain     rib, after fall, moves to back     Subjective  This is a 58-year-old patient of Dr. Donald Rooney who comes in to follow-up on an ER visit for chest wall injury.  She slipped on the ice just under 2 weeks ago and landed on her right side.  The next day she presented to the emergency room because of right chest wall pain.  I have had an opportunity to review the emergency room notes including the x-ray report which was negative.  They felt that clinically she might still have a rib fracture and they did give her 20 Percocet and she has been using them sparingly for when the pain is at its most severe.  She does not feel she has made much improvement in the 2 weeks.  The symptoms she describes are very typical of chest wall pain including pain increasing with breathing or coughing or sneezing.  She has trouble laying on that right side and with any movement involving the right side of her chest.  No shortness of breath.    ROS A comprehensive review of systems was performed and was otherwise negative    Medications, allergies, and problem list were reviewed and updated    Exam  General Appearance:   On examination her blood pressure is 118/60.    Heart is in a sinus rhythm with a rate of 100 no ectopy.    Lungs are clear with good breath sounds on the right side.    She is quite tender over the lateral right rib cage.    The patient is alert and oriented x3.      Assessment/Plan  1. Chest wall pain       Right chest wall pain secondary to fall.  I had a lengthy discussion with her regarding the natural history of chest wall pain and advised her that it could be another 1-3 weeks before the symptoms resolved.  In the meantime I suggested she try some heat or cold or alternating the 2.  I gave her a few more Percocet to have on hand particularly for  bedtime to help her sleep.  She will follow-up with Dr. Rooney as needed.  Body Mass Index was not assessed due to patient refusal.    Donald Garcia MD      Current Outpatient Medications on File Prior to Visit   Medication Sig     albuterol (PROAIR HFA;PROVENTIL HFA;VENTOLIN HFA) 90 mcg/actuation inhaler Inhale 2 puffs every 4 (four) hours as needed for wheezing.     benztropine (COGENTIN) 1 MG tablet Take 1 tablet (1 mg total) by mouth daily.     carBAMazepine (TEGRETOL) 200 mg tablet Take 200 mg by mouth 3 (three) times a day. 2 tabs (400mg) q AM, 1.5 tabs (300mg) q PM and HS     diphenhydrAMINE HCl 25 mg TbDL Take 25 mg by mouth at bedtime.     DULoxetine (CYMBALTA) 60 MG capsule Take 2 capsules (120 mg total) by mouth daily.     EPINEPHrine 0.15 mg/0.15 mL atIn Inject 1 each into the shoulder, thigh, or buttocks.     ethotoin (PEGANONE) 250 mg Tab Take 1,000 mg by mouth 3 (three) times a day.     levothyroxine (SYNTHROID) 200 MCG tablet Take 1 tablet (200 mcg total) by mouth daily.     levothyroxine (SYNTHROID, LEVOTHROID) 175 MCG tablet Take 1 tablet (175 mcg total) by mouth daily.     nicotine polacrilex (NICORETTE) 2 mg gum Apply 1 each (2 mg total) to the mouth or throat as needed for smoking cessation.     omeprazole (PRILOSEC) 20 MG capsule Take 1 capsule by mouth daily before breakfast (generic.Prilosec).     oxybutynin (DITROPAN XL) 10 MG ER tablet Take 1 tablet (10 mg total) by mouth daily.     polyethylene glycol (MIRALAX) 17 gram packet Take 17 g by mouth daily as needed.      QUEtiapine (SEROQUEL) 200 MG tablet Take 1 tablet (200 mg total) by mouth at bedtime (generic. Seroquel)     risperiDONE (RISPERDAL) 0.5 MG tablet Take 1 tablet (0.5 mg total) by mouth at bedtime.     rosuvastatin (CRESTOR) 20 MG tablet Take 1 tablet (20 mg total) by mouth daily.     senna (SENOKOT) 8.6 mg tablet Take 2 tablets by mouth 2 (two) times a day.     traZODone (DESYREL) 100 MG tablet Take 2 tablets (200 mg total)  by mouth at bedtime.     VITAMIN D3 2,000 unit capsule Take 1 capsule by mouth daily (vitamin d3)     warfarin (COUMADIN) 5 MG tablet Take 2 tablet by mouth two days per week and take 1 and 1/2 tablet by mouth the other 5 days of the week     [DISCONTINUED] oxyCODONE-acetaminophen (PERCOCET/ENDOCET) 5-325 mg per tablet Take 1-2 tablets by mouth every 6 (six) hours as needed for pain.     No current facility-administered medications on file prior to visit.      Allergies   Allergen Reactions     Aspirin (Tartrazine Only) Shortness Of Breath     Can have asa as long as no yellow dye     Gabapentin Rash     Guilherme Johnsons rash     Venom-Honey Bee Anaphylaxis     Vistaril [Hydroxyzine Pamoate] Other (See Comments)     Excessive sedation     Yellow Dye Shortness Of Breath     Linked only to tartazine.     Hydroxyzine Other (See Comments)     Sluggish; unable to wake up     Latex Itching     Other Environmental Allergy      Dust, Mold, Pollen     Phenobarbital Other (See Comments)     unknown     Latex Itching     Social History     Tobacco Use     Smoking status: Current Every Day Smoker     Packs/day: 2.00     Years: 35.00     Pack years: 70.00     Types: Cigarettes     Start date: 6/10/1974     Smokeless tobacco: Never Used     Tobacco comment: smoking 1-2 a day   Substance Use Topics     Alcohol use: No     Drug use: No

## 2021-06-22 NOTE — PROGRESS NOTES
Behavioral Care Nurse Only Visit    12/6/2018  Last visit: 11/6/18            Chief Complaint   Patient presents with     Follow-up           Current Outpatient Medications:      albuterol (PROAIR HFA;PROVENTIL HFA;VENTOLIN HFA) 90 mcg/actuation inhaler, Inhale 2 puffs every 4 (four) hours as needed for wheezing., Disp: 1 Inhaler, Rfl: 6     atorvastatin (LIPITOR) 20 MG tablet, Take 1 tablet by mouth every evening for cholesterol., Disp: 30 tablet, Rfl: 10     benztropine (COGENTIN) 1 MG tablet, Take 1 tablet (1 mg total) by mouth daily., Disp: 30 tablet, Rfl: 0     carBAMazepine (TEGRETOL) 200 mg tablet, Take 200 mg by mouth 3 (three) times a day. 2 tabs (400mg) q AM, 1.5 tabs (300mg) q PM and HS, Disp: , Rfl:      diphenhydrAMINE HCl 25 mg TbDL, Take 25 mg by mouth at bedtime., Disp: 30 each, Rfl: 2     DULoxetine (CYMBALTA) 60 MG capsule, Take 2 capsules (120 mg total) by mouth daily., Disp: 60 capsule, Rfl: 4     EPINEPHrine 0.15 mg/0.15 mL atIn, Inject 1 each into the shoulder, thigh, or buttocks., Disp: , Rfl:      ethotoin (PEGANONE) 250 mg Tab, Take 1,000 mg by mouth 3 (three) times a day., Disp: , Rfl:      levothyroxine (SYNTHROID, LEVOTHROID) 175 MCG tablet, Take 1 tablet (175 mcg total) by mouth daily., Disp: 30 tablet, Rfl: 9     nicotine polacrilex (NICORETTE) 2 mg gum, Apply 1 each (2 mg total) to the mouth or throat as needed for smoking cessation., Disp: 220 each, Rfl: 2     omeprazole (PRILOSEC) 20 MG capsule, Take 1 capsule by mouth daily before breakfast (generic.Prilosec)., Disp: 30 capsule, Rfl: 10     oxybutynin (DITROPAN XL) 10 MG ER tablet, Take 1 tablet (10 mg total) by mouth daily., Disp: 30 tablet, Rfl: 5     polyethylene glycol (MIRALAX) 17 gram packet, Take 17 g by mouth daily as needed. , Disp: , Rfl:      QUEtiapine (SEROQUEL) 200 MG tablet, Take 1 tablet (200 mg total) by mouth at bedtime (generic. Seroquel), Disp: 30 tablet, Rfl: 4     risperiDONE (RISPERDAL) 0.5 MG tablet, Take 1  "tablet (0.5 mg total) by mouth at bedtime., Disp: 30 tablet, Rfl: 2     senna (SENOKOT) 8.6 mg tablet, Take 2 tablets by mouth 2 (two) times a day., Disp: , Rfl:      traZODone (DESYREL) 150 MG tablet, Take 1 tablet (150 mg total) by mouth at bedtime., Disp: 30 tablet, Rfl: 4     VITAMIN D3 2,000 unit capsule, Take 1 capsule by mouth daily (vitamin d3), Disp: 30 capsule, Rfl: 10     warfarin (COUMADIN) 5 MG tablet, Take 2 tablet by mouth two days per week and take 1 and 1/2 tablet by mouth the other 5 days of the week, Disp: 50 tablet, Rfl: 19    Stated taking medications as directed.  yes If no, document variance on medication Reconciliation form.  Pregnant: no     SUBJECTIVE    Sleep disturbance: yes Having trouble falling asleep some nights. Usually wakes up at abut 1 or 2am. Sometines takes her awhile to fall back to sleep.    Irritability: yes \"Sometimes\"    Sad/depressed mood: no    Mood Swings/Manic Symptoms: no    Anxiety: yes most days 3/10    Panic attacks: no    Paranoia: no    Hallucinations: no    Delusions: no    Obsessive/Compulsive: no    Impaired concentration: yes \"I always have problems with my concentration.    Cognitive difficulties: yes Having some memory problems but no change from before. Baseline.     Suicide thoughts/SIB: no    Homicide thoughts/aggession:no    Verbal/physical/property  Comments:N/A    Change in interest: no    Hopeless/helpless/worthless: yes \"Seems like I get close to somebody and they die\" Grandmother and hairdresser  recently    Change in energy: yes \"Sometimes I just don't fell like doing nothing.\"    Change in libido: no    Alcohol use: no    Drug use: no    Appetite disturbance:no    Physical symptoms: yes Pain in the back and neck 2/10      Vitals:    18 1232   BP: 137/73   Pulse: 93   Resp: 20   Temp: 98.5  F (36.9  C)   TempSrc: Oral   Weight: (!) 317 lb (143.8 kg)   Height: 5' 6\" (1.676 m)   PainSc:   2     If Pain, who will address pain? Reports that " "her pain is manageable right now.     Discuss: NA    Nursing Comments: Patient is here with her . Patient reports that she has been \"mostly the same,\" except that her sleep is not \"as good as it used to be.\" She says that the \"sleeping pill that Dr. Puente gave me isn't working.\" She described the medication as a pink and white capsule. The medication could not be determined by examining the patient med list.     PER PATIENT:  What are the main concerns you would like addressed during your visit? Would like her sleeping medication re-evaluated because it doesn't seem to be helping.     Employment/Day program/School: No    Living Situation: Family    Ambulatory on their own: yes  Status: Ambulatory  Did patient need assisting with getting weight? jordan Patel     12/6/2018/12:38 PM              "

## 2021-06-22 NOTE — TELEPHONE ENCOUNTER
ANTICOAGULATION  MANAGEMENT PROGRAM    Valentina Olmedo is overdue for INR check.  Reminder call made.    Spoke with Valentina and scheduled INR appointment on 1/8 .    Fanta Smith RN

## 2021-06-22 NOTE — PROGRESS NOTES
Illness Management and Recovery (IM&R) Group Note    Date of Service:12/10/2018 Start Time: 100  Stop Time: 2:00 Total Time: 60 minutes    Frequency: Monday    Type of the visit:  Group therapy     Session: 4    N=5    Facilitator(s): CUBA Mckinnon; [facility]     Subjective: Patient was able to participate and benefit from treatment as evidenced by their  verbal expression and understanding of idea discussed.     Clinical assessment of mental status:   Valentina Olmedo presented on time.   She was oriented x3, open and cooperative, and dressed appropriately for this session and weather. Her memory was Normal cognitive functioning .  Her speech was  Within normal.  Language was appropriate.  Concentration and focus is Within normal. Psychosis is not noted or reported. She reports her mood is Depressed.  Affect is congruent with speech and is Congruent w/content of speech.  Fund of knowledge is adequate. Insight is adequate for therapy.     New Symptoms or Complaints: no meaningful activity    Reason(s) Patient is participating in the group: The IM&R group session was necessary for the care of the patient to address how symptoms of mental illness and or/addiction impact the patient s daily functioning and to learn healthy coping strategies.      No barriers to learning evidenced.     Progress toward short-term goals: The patient completed the weekly home assignment at (use lurkin scale score): Patient reports some small steps made toward social activities. Has been joining other residents in the community room several times a week. Reports she     Patient's impression of their current status:The patient indicated readiness to learn by the patient s choice to attend group.   Enjoyed the group project today and plans to replicate the same project on her own.     Therapist impression of patients' current state: This 58 y.o. White or  female presents participated actively in today's IM&R group with  following format:     [X Writer socialized informally and identified any major problem  [X] Reviewed the previous session: goal review  [X] Followed up on the weekly goals for each member using the Likert scale for goal achievement.   [X] Reinforced small success toward the weekly goal and discussed reported barriers  [X] Set agenda for current session  [X] Introduced the new topic on: group project with water, clear glue and glitter.    [X] Used education/interactive strategies of inviting members to take tourn reading the new material.  [X] Used Cognitive Behavioral therapy skills as needed  [X]  Used Motivational Interviewing/ reflection/ elicit reactions/ questions, checked for understanding, and summarized today's content of material.  [X] Agreed on new weekly home assignment  [X] Announced the topic for the next session    Review of long term goals: weekly review    Type of psychotherapeutic technique(s) provided: CBT, Education and Motivational Interviewing.     The patient was given an opportunity to ask questions and participate in the group discussion. The patient exhibited individual understanding by asking relevant questions and making appropriate comments to other group members.     Assessment tools used today include: How do you feel today?,Likert scale for goal achievement: 0= did nothing; 1. Thought about it; 2: did some of it; 3. Did most of it; 4 completed it.     Diagnosis:  1. Moderate major depression (H)    2. Nicotine dependence, cigarettes, with other nicotine-induced disorders        No indication of SI, plan and/or means.     Psychotherapeutic Techniques: A cognitive behavior, Motivational Interviewing, and Education.     Plan and Follow-up :Continue to attend groups and work on recovery goal.    Discharge Criteria Planning: Decrease  health symptoms and hospitalizations.    Provider: Performed and documented by ARTURO Mckinnon; [facility] [place] 12/10/2018

## 2021-06-22 NOTE — PROGRESS NOTES
Attempt 1-Care Guide called patient.  If this patient is returning our call please transfer to Dionne at ext. [phone]    Next outreach due: 12/14/18

## 2021-06-22 NOTE — TELEPHONE ENCOUNTER
LMOM for [place]-  Dosage for levothyroxine should be 200mcg daily per last TSH. She should have TSH repeated in 4-6 weeks after being on that dose- call if additional Q's

## 2021-06-22 NOTE — PROGRESS NOTES
Called the patient and . Reached the  who stated he will be able to come to group but he was not sure about the patient since she is not feeling well this morning

## 2021-06-22 NOTE — TELEPHONE ENCOUNTER
Medication Question or Clarification  Who is calling: Other: FABRICIO Hagen home care nurse (there is only a consent for [facility] on file)  What medication are you calling about?: levothroxine  What dose do you take?: 175 mcg  How often are you taking the medication?: n/a  Who prescribed the medication?: Donald Rooney MD   What is your question/concern?: Caller stated patient informed her Donald Rooney MD wanted her to increase her levothyroxine 175 mcg pill to twice per day. Caller stated she would like to get a clarification on this.  Pharmacy: [facility] [place]  Okay to leave a detailed message?: Yes   Please call Xiao back at [phone]  Site CMT - Please call the pharmacy to obtain any additional needed information.

## 2021-06-22 NOTE — TELEPHONE ENCOUNTER
Who is calling:   Care Taker  Reason for Call:   Prescription was sent to the wrong Pharmacy  Please send to the [facility] in [place]  Date of last appointment with primary care:   12/26/2018  Has the patient been recently seen:  Yes  Okay to leave a detailed message: No
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
## 2021-06-22 NOTE — PROGRESS NOTES
"  Office Visit - Follow Up   Valentina Olmedo   58 y.o. female    Date of Visit: 12/14/2018    Chief Complaint   Patient presents with     Hypertension        Assessment and Plan   1. Essential hypertension with goal blood pressure less than 140/90  Blood pressure controlled continue current medications  - Basic Metabolic Panel    2. Visit for screening mammogram  - Mammo Screening Bilateral; Future    3. Pulmonary embolism with infarction (H)  Continue warfarin    4. Nicotine dependence, cigarettes, with other nicotine-induced disorders  She has essentially quit smoking, excellent    5. Anxiety disorder, unspecified type  Stable per psychiatry    6. Stage 3 chronic kidney disease (H)  Creatinine has been stable    7. Hypothyroidism due to acquired atrophy of thyroid  TSH mildly elevated recheck, increase levothyroxine if elevated  - Thyroid Stimulating Hormone (TSH)    10. Morbid obesity with BMI of 45.0-49.9, adult (H)  The following high BMI interventions were performed this visit: encouragement to exercise and lifestyle education regarding diet    Return in about 3 months (around 3/14/2019) for recheck.     History of Present Illness   This 58 y.o. old woman comes in for follow-up.  Overall doing okay.  Mood is been stable.  Tolerating warfarin.  She is gained some weight because she has quit smoking, excellent with the smoking cessation.  No chest pain or shortness of breath.  No nausea vomiting or diarrhea.  She has been taking her statin and her lipids were recently elevated.  She also states she is taking her levothyroxine at night and her TSH is mildly elevated.    Review of Systems: A comprehensive review of systems was negative except as noted.     Medications, Allergies and Problem List   Reviewed, reconciled and updated     Physical Exam   General Appearance:   No acute distress    /86 (Patient Site: Right Arm, Patient Position: Sitting, Cuff Size: Adult Regular)   Pulse 83   Ht 5' 6\" (1.676 m)   " Wt (!) 323 lb (146.5 kg)   SpO2 98%   BMI 52.13 kg/m      HEENT exam is unremarkable  Neck supple no thyromegaly or nodule palpable  Lymphatic no cervical lymphadenopathy  Cardiovascular regular rate and rhythm no murmur gallop or rub  Pulmonary lungs are clear to auscultation bilaterally  Gastrointestinall abdomen soft nontender nondistended no organomegaly  Neurologic exam is non focal  Psychiatric pleasant, no confusion or agitation        Additional Information   Current Outpatient Medications   Medication Sig Dispense Refill     albuterol (PROAIR HFA;PROVENTIL HFA;VENTOLIN HFA) 90 mcg/actuation inhaler Inhale 2 puffs every 4 (four) hours as needed for wheezing. 1 Inhaler 6     benztropine (COGENTIN) 1 MG tablet Take 1 tablet (1 mg total) by mouth daily. 30 tablet 0     carBAMazepine (TEGRETOL) 200 mg tablet Take 200 mg by mouth 3 (three) times a day. 2 tabs (400mg) q AM, 1.5 tabs (300mg) q PM and HS       diphenhydrAMINE HCl 25 mg TbDL Take 25 mg by mouth at bedtime. 30 each 2     DULoxetine (CYMBALTA) 60 MG capsule Take 2 capsules (120 mg total) by mouth daily. 60 capsule 4     EPINEPHrine 0.15 mg/0.15 mL atIn Inject 1 each into the shoulder, thigh, or buttocks.       ethotoin (PEGANONE) 250 mg Tab Take 1,000 mg by mouth 3 (three) times a day.       levothyroxine (SYNTHROID, LEVOTHROID) 175 MCG tablet Take 1 tablet (175 mcg total) by mouth daily. 30 tablet 9     nicotine polacrilex (NICORETTE) 2 mg gum Apply 1 each (2 mg total) to the mouth or throat as needed for smoking cessation. 220 each 2     omeprazole (PRILOSEC) 20 MG capsule Take 1 capsule by mouth daily before breakfast (generic.Prilosec). 30 capsule 10     oxybutynin (DITROPAN XL) 10 MG ER tablet Take 1 tablet (10 mg total) by mouth daily. 30 tablet 5     polyethylene glycol (MIRALAX) 17 gram packet Take 17 g by mouth daily as needed.        QUEtiapine (SEROQUEL) 200 MG tablet Take 1 tablet (200 mg total) by mouth at bedtime (generic. Seroquel) 30  tablet 4     risperiDONE (RISPERDAL) 0.5 MG tablet Take 1 tablet (0.5 mg total) by mouth at bedtime. 30 tablet 2     senna (SENOKOT) 8.6 mg tablet Take 2 tablets by mouth 2 (two) times a day.       traZODone (DESYREL) 150 MG tablet Take 1 tablet (150 mg total) by mouth at bedtime. 30 tablet 4     VITAMIN D3 2,000 unit capsule Take 1 capsule by mouth daily (vitamin d3) 30 capsule 10     warfarin (COUMADIN) 5 MG tablet Take 2 tablet by mouth two days per week and take 1 and 1/2 tablet by mouth the other 5 days of the week 50 tablet 19     rosuvastatin (CRESTOR) 20 MG tablet Take 1 tablet (20 mg total) by mouth daily. 90 tablet 3     No current facility-administered medications for this visit.      Allergies   Allergen Reactions     Aspirin (Tartrazine Only) Shortness Of Breath     Can have asa as long as no yellow dye     Gabapentin Rash     Guilherme Johnsons rash     Venom-Honey Bee Anaphylaxis     Vistaril [Hydroxyzine Pamoate] Other (See Comments)     Excessive sedation     Yellow Dye Shortness Of Breath     Linked only to tartazine.     Hydroxyzine Other (See Comments)     Sluggish; unable to wake up     Latex Itching     Other Environmental Allergy      Dust, Mold, Pollen     Phenobarbital Other (See Comments)     unknown     Latex Itching     Social History     Tobacco Use     Smoking status: Current Every Day Smoker     Packs/day: 2.00     Years: 35.00     Pack years: 70.00     Types: Cigarettes     Start date: 6/10/1974     Smokeless tobacco: Never Used     Tobacco comment: smoking 1-2 a day   Substance Use Topics     Alcohol use: No     Drug use: No       Review and/or order of clinical lab tests:  Review and/or order of radiology tests:  Review and/or order of medicine tests:  Discussion of test results with performing physician:  Decision to obtain old records and/or obtain history from someone other than the patient:  Review and summarization of old records and/or obtaining history from someone other than  the patient and.or discussion of case with another health care provider:  Independent visualization of image, tracing or specimen itself:    Time:      Donald Rooney MD

## 2021-06-22 NOTE — PROGRESS NOTES
"Mental Health Visit Note    2018    Start time: 1:00pm    Stop Time: 1:55pm  Session # 13    Valentina Olmedo is a 58 y.o. female is being seen today for   Chief Complaint   Patient presents with      Follow Up     Depression     sadness about hairdresser     Anxiety     ambivalence about family spending   .     New symptoms or complaints: Sadness that kindraer has .    Functional Impairment:   Personal: 3  Family: 2  Work: 4  Social: 4    Clinical assessment of mental status:  Patient's grooming is Well groomed, his attire is Appropriate, and his age appears Appears Stated. Behavior towards examiner is Cooperative, motor activity is Within normal, and eye contact is Staring.  Patient's mood is Sad, and affect is Blunted.  Speech/language is Delayed/Hesitant, attention is Distractible, and concentration is Brief. Thought process is Slow, thought content is Within noraml and  Within normal.  Patient's orientation is X 3, memory is  Impairment and Recent, judgement is Impairment and Minimal, and estimated intelligence is Below Average. Demonstrated insight is Adequate while fund of knowledge is adequate.    Suicidal/Homicidal Ideation present: None reported.    Patient's impression of their current status:   Patient reports mood is \"sad and down\" following finding out yesterday that her hairdresser .   Processes feeelings about spouse's SSDI back-payment, anxiety and ambivalence about how to make sure there are not problems with benefits.    Patient reports  follow up with therapy homework: attending  Illness and Recovery group: setting goals to be more active and getting out of the house. She and spouse used SSDI over payment to to pay off current bills, pay off phones. shaver for spouse, will put some away for emergencies under the direction of their .  Walked at [facility] x1 and near her building, with spouse as company. She continues efforts to get up and dressed daily, about " 50% of the time.     Therapist impression of patients current state: Mood improved with better support via group and financial stability.  Notable that she does not have shaking leg during session. Better efforts to get out into the community, attending group.    Processed negative cognitions, reinforced strengths, validated patient efforts and feelings.  Discussed the difference between depression and sadness/grief (as regards loss of hairdresser).  Normalized grief in the wake of loss. Reinforced her efforts to create financial stability by paying off bills. Brainstormed supports in place that might help she and spouse figure out how to secure services and best use of back-payment.    Type of psychotherapeutic technique provided: Client centered, Solution-focused and CBT      Progress toward short term goals: Progress as expected: attending  Illness and Recovery group: setting goals to be more active and getting out of the house. She and spouse used SSDI over payment to to pay off current bills, pay off phones. shaver for spouse, will put some away for emergencies under the direction of their .  Walked at [facility] x1 and near her building, with spouse as company. She continues efforts to get up and dressed daily, about 50% of the time.     Review of long term goals: Long-term goals reviewed   and Treatment Plan updated    Diagnosis:   1. Depression, major, recurrent, moderate (H)    2. Generalized anxiety disorder    3. Borderline personality disorder (H)    4. Cognitive deficits        Plan and Follow up: Patient will return for follow up in four weeks. She will ask spouse to ask his  and [employer] worker for help reporting new income and protecting services. She will continue [facility] Illness and Recovery group and participate in goal setting.  She will walk again at [facility] with spouse.  She will continue efforts to get up and dressed and out of the bedroom during the daytime. She  will attempt gentle walking with excursions into the community.      Discharge Criteria/Planning: Client has chronic symptoms and ongoing therapy for maintenance stability recommended.    Loretta Barney MSW, LICSW  12/6/2018

## 2021-06-22 NOTE — PROGRESS NOTES
Patient was seen today for PCP visit.  She had a fall this morning and she is quiet sore from it especially in her ribs. She mentioned it to Dr Rooney at her visit and he advised that she be evaluated in the ED. She has not gone to be evaluated yet today but thinks that she will go later this evening if the pain continues.  She has not picked up her new Rx yet, she will do that after she has gone to the ED. She did not have any questions regarding her medications.     Next outreach due: 12/27/18

## 2021-06-22 NOTE — PROGRESS NOTES
"Mental Health Visit Note    1/3/2019    Start time: 1:00pm    Stop Time: 1:55pm  Session # 14    Valentina Olmedo is a 58 y.o. female is being seen today for   Chief Complaint   Patient presents with      Follow Up     Depression     frustration with broken ribs and limited mobility, interpersonal stress with spouse     Anxiety     anxiety about spend-down   .     New symptoms or complaints: frustration with broken ribs/limited mobility, interpersonal stress with spouse    Functional Impairment:   Personal: 3  Family: 2  Work: 4  Social: 4    Clinical assessment of mental status:  Patient's grooming is Well groomed, his attire is Appropriate, and his age appears Appears Stated. Behavior towards examiner is Cooperative, motor activity is Within normal, and eye contact is Staring.  Patient's mood is Anxious, and affect is Blunted and Anxious.  Speech/language is Delayed/Hesitant, attention is Distractible, and concentration is Brief. Thought process is Slow, thought content is Within noraml and  Within normal.  Patient's orientation is X 3, memory is  Impairment and Recent, judgement is Impairment and Minimal, and estimated intelligence is Below Average. Demonstrated insight is Adequate while fund of knowledge is adequate.    Suicidal/Homicidal Ideation present: None reported.    Patient's impression of their current status:   Patient reports mood as \"stressed!\".  Patient processes stress in interpersonal stress with spouse who ran out of meds, she experiences him as irritable and worries about his not sleeping.  Processes some low mood in the context of pain with broken ribs.    Patient reports mixed follow up with therapy homework: asked spouse to obtain help with income reporting requirements, he is now getting help and they are working on spend-down. She continues attendance at MI and Recovery group, participates in goal setting.  Did not walk at [place] due to fall and broken ribs. Associated difficulty getting " dressed and out of the house and walking, agreeable to maintain this as a goal.     Therapist impression of patients current state: Despite patient complaints, patient mood only mildly anxious and depressed but overall an improvement from a year ago. Able to activate and discuss concerns rather than blocking and overwhelm. Physically appearing less anxious.  Making limited efforts to increase structured activity via MH group. Maintaining herself in the community without crisis or  emergency.    Processed negative cognitions, reinforced strengths, validated patient efforts and feelings.  Practiced assertive communication to utilize with spouse's irritability, discussed importance of making emotional space so that her mood is less affected.  Patient role-plays with some difficulty (cognitive challenges suspected).  Problem-solved how she can help spouse get a medication issue figured out, identified helping him call pharmacy and nurse regarding meds.     Type of psychotherapeutic technique provided: Client centered, Solution-focused and CBT      Progress toward short term goals: Mixed Progress: asked spouse to obtain help with income reporting requirements, he is now getting help and they are working on spend-down. She continues attendance at MI and Recovery group, participates in goal setting.  Did not walk at [place] due to fall and broken ribs. Associated difficulty getting dressed and out of the house and walking, agreeable to maintain this as a goal.       Review of long term goals: Not done at today's visit and Date of last review 12/6/2018    Diagnosis:   1. Moderate major depression (H)    2. Generalized anxiety disorder    3. Chronic post-traumatic stress disorder (PTSD)    4. Borderline personality disorder in adult (H)        Plan and Follow up: Patient will return for follow up in four weeks. Patient to continue homework from last session 12/20/18:   Work with social workers/spouse's [facility] to manage  spend down in ways that will benefit her family. She will continue MI Illness and Recovery group and participate in goal setting. She will continue efforts to get up and dressed and out of the bedroom during the daytime. She will attempt gentle walking with excursions into the community.      Discharge Criteria/Planning: Client has chronic symptoms and ongoing therapy for maintenance stability recommended.    Loretta Barney MSW, LICSW  1/3/2019

## 2021-06-22 NOTE — TELEPHONE ENCOUNTER
ANTICOAGULATION  MANAGEMENT    Assessment     Today's INR result of 2.80 is Therapeutic (goal INR of 2.0-3.0)        Warfarin taken as previously instructed    No new diet changes affecting INR    No new medication/supplements affecting INR    Continues to tolerate warfarin with no reported s/s of bleeding or thromboembolism     Previous INR was Supratherapeutic at 3.10 on     Plan:     Spoke with Valentina regarding INR result and instructed:    1.  Agreed to come in sooner if any active bleeding or new med changes.    Warfarin Dosing Instructions:    - Continue current warfarin dose 7.5 mg daily.    Instructed patient to follow up no later than:  2 wks.   - however, has appt on 2/5/19 with Dr. Rooney and would prefer to check then.    Education provided: importance of consistent vitamin K intake, impact of vitamin K foods on INR, target INR goal and significance of current INR result, importance of taking warfarin as instructed, importance of notifying clinic for changes in medications, monitoring for bleeding signs and symptoms, when to seek medical attention/emergency care and importance of notifying clinic for diarrhea, nausea/vomiting, reduced intake and/or illness    Valentina verbalizes understanding and agrees to warfarin dosing plan.    Instructed to call the [facility] for any changes, questions or concerns. (#[phone])   ?   Shonna Ernandez RN    Subjective/Objective:      Valentina Olmedo, a 58 y.o. female is on warfarin.     Valentina reports:     Home warfarin dose: verbally confirmed home dose with Valentina and updated on anticoagulation calendar     Missed doses: No     Medication changes:  No     S/S of bleeding or thromboembolism:  No     New Injury or illness:  No     Changes in diet or alcohol consumption:  No     Upcoming surgery, procedure or cardioversion:  No    Anticoagulation Episode Summary     Current INR goal:   2.0-3.0   TTR:   62.8 % (1.4 y)   Next INR check:   2/5/2019   INR from last check:   2.80  (1/8/2019)   Weekly max warfarin dose:      Target end date:      INR check location:      Preferred lab:      Send INR reminders to:   ANTICOAGULATION POOL C (DTN,VAD,CGR,GAV)    Indications    Pulmonary embolism with infarction (H) [I26.99]           Comments:            Anticoagulation Care Providers     Provider Role Specialty Phone number    Donald Rooney MD Referring Internal Medicine [phone]

## 2021-06-22 NOTE — PROGRESS NOTES
Writer talked to both patient and   Today. Patient confirmed that she fell while walking outside due to muscle weakening, last Friday. She stated she fell on the side and felt she has broken her ribs. Went to ER and Xray did not find any broken bone. Though patient is still hurting. She plans to schedule for a Follow with her PCP.  Patient stated she and  plan to continue with the group and were updated about the dates to return which is 12/31.  Patient also plans to attend her 1:1 session with Loretta this week unless her pain becomes unbearable.

## 2021-06-23 NOTE — PROGRESS NOTES
Mental Health Visit Note    1/17/2019    Start time: 1:00pm    Stop Time: 1:55pm  Session # 2    Valentina Olmedo is a 58 y.o. female is being seen today for   Chief Complaint   Patient presents with      Follow Up     Depression     someone improved, frustrated with medicine coverage     Anxiety     improved since last session   .     New symptoms or complaints: None     Functional Impairment:   Personal: 3  Family: 2  Work: 4  Social: 4 (no social support apart from , but has joined MI    Clinical assessment of mental status: Patient's grooming is Well groomed, his attire is Appropriate, and his age appears Appears Stated. Behavior towards examiner is Cooperative, motor activity is Within normal, and eye contact is Staring.  Patient's mood is Euthymic, and affect is Blunted and Flat.  Speech/language is Delayed/Hesitant, attention is Distractible, and concentration is Brief. Thought process is Slow, thought content is Within noraml and  Within normal.  Patient's orientation is X 3, memory is  Impairment and Recent, judgement is Impairment and Minimal, and estimated intelligence is Below Average. Demonstrated insight is Adequate while fund of knowledge is adequate.    Suicidal/Homicidal Ideation present: None reported.    Patient's impression of their current status:   Patient reports mood as somewhat improved once spouse got his meds straightened out.  They are getting along better.   Processes stressors and decisions around finances.    Patient reports follow up with therapy homework: Worked with her own and spouse's  CM to determine required spend-down.  She continues to attend Mental Illness and Recovery Group.  Feels that this is going OK, doing some goal setting.  She made better efforts at getting up and dressed, per less pain from her accident/ribs. She is making some effort to walk in the community: walked in apartment building, bed store, and does not arrive in wheelchair today.     Therapist  impression of patients current state: Patient reports improved mood in terms of both anxiety and depression since last session.  As noted, overall improvement from 1 year ago.  A little better able to activate and improved ability to to discuss concerns rather than blocking and overwhelmed.  She continues to appear less tense and anxious.  Making limited efforts to increase structure via mental health group here at [facility].  She is maintaining herself in the community without crisis or mental health emergency.    Processed negative cognitions, reinforced strengths, validated patient efforts and feelings.  Role-played  issues that cause irritability in her marital relationship.  Again, patient minimally able to conduct role-play probably due to cognitive issues but she is able to follow the overall concepts.  Reinforced importance of making emotional space so that her mood is less affected.    Type of psychotherapeutic technique provided: Client centered, Solution-focused and CBT      Progress toward short term goals: Progress as expected:  Worked with her own and spouse's MH CM to determine required spend-down.  She continues to attend Mental Illness and Recovery Group.  Feels that this is going OK, doing some goal setting.  She made better efforts at getting up and dressed, per less pain from her accident/ribs. She is making some effort to walk in the community: walked in apartment building, bed store, and does not arrive in wheelchair today.     Review of long term goals: Not done at today's visit and Date of last review 12/6/2018    Diagnosis:   1. Moderate major depression (H)    2. Generalized anxiety disorder    3. Chronic post-traumatic stress disorder (PTSD)    4. Borderline personality disorder in adult (H)        Plan and Follow up: Patient will return for follow up in four weeks. Patient to continue homework from last session 1/3/2019: She will work with  and spouse  to manage spend down in ways that will benefit their family.  She will continue mental illness and recovery group and participate in goal setting.  She will continue efforts to get up and dressed and out of the bedroom during the daytime.  She will attempt gentle walking with excursions into the community 2 times per week.    Discharge Criteria/Planning: Client has chronic symptoms and ongoing therapy for maintenance stability recommended.    Loretta Barney MSW, LICSW  1/17/2019

## 2021-06-23 NOTE — PROGRESS NOTES
"  Illness Management and Recovery (IM&R) Group Note    Date of Service:1/14/2019 Start Time: 100  Stop Time: 2:00 Total Time: 60 minutes    Frequency: Monday    Type of the visit:  Group therapy     Session: 1 this year    N=6  Facilitator(s): CBUA Mckinnon -BELKIS; [facility]     Subjective: Patient was able to participate and benefit from treatment as evidenced by their  verbal expression and understanding of idea discussed.     Clinical assessment of mental status:   Valentina Olmedo presented on time.   She was oriented x3, open and cooperative, and dressed appropriately for this session and weather. Her memory was Mild .  Her speech was  Soft.  Language was appropriate.  Concentration and focus is Within normal. Psychosis is not noted or reported. She reports her mood is a\" little Anxious\".  Affect is congruent with speech and is Congruent w/content of speech.  Fund of knowledge is adequate. Insight is adequate for therapy.     New Symptoms or Complaints: \" no much going on. I am still in pain\"    Reason(s) Patient is participating in the group: The IM&R group session was necessary for the care of the patient to address how symptoms of mental illness and or/addiction impact the patient s daily functioning and to learn healthy coping strategies.      No barriers to learning evidenced.     Progress toward short-term goals: The patient completed the weekly home assignment at (use lurkin scale score): 2/4 did some of her weekly goals. Still can't walk far due to recent fall.     Patient's impression of their current status:The patient indicated readiness to learn by the patient s choice to attend group.     Therapist impression of patients' current state: This 58 y.o. White or  female presents participated actively in today's IM&R group with following format:     [X Writer socialized informally and identified any major problem  [X] Reviewed the previous session:  Module 8: class 3: How to prevent stress " using your most important values  [X] Followed up on the weekly goals for each member using the Likert scale for goal achievement.   [X] Reinforced small success toward the weekly goal and discussed reported barriers  [X] Set agenda for current session  [X] Introduced the new topic on:Module 8: Class 4: Strategies to cope with stress   [X] Used education/interactive strategies of inviting members to take tourn reading the new material.  [X] Used Cognitive Behavioral therapy skills as needed  [X]  Used Motivational Interviewing/ reflection/ elicit reactions/ questions, checked for understanding, and summarized today's content of material.  [X] Agreed on new weekly home assignment  [X] Announced the topic for the next session    Review of long term goals: Weekly review    Type of psychotherapeutic technique(s) provided: CBT, Education and Motivational Interviewing.     The patient was given an opportunity to ask questions and participate in the group discussion. The patient exhibited individual understanding by asking relevant questions and making appropriate comments to other group members.     Assessment tools used today include: How do you feel today?,Likert scale for goal achievement: 0= did nothing; 1. Thought about it; 2: did some of it; 3. Did most of it; 4 completed it.     Diagnosis:  1. Moderate major depression (H)    2. Generalized anxiety disorder    3. Chronic post-traumatic stress disorder (PTSD)      No indication of SI, plan and/or means.     Psychotherapeutic Techniques: A cognitive behavior, Motivational Interviewing, and Education.     Plan and Follow-up :Continue to attend groups and work on recovery goal.    Discharge Criteria Planning: Decrease  health symptoms and hospitalizations.    Provider: Performed and documented by CUBA Mckinnon- BELKIS; [facility] 1/14/2019

## 2021-06-23 NOTE — TELEPHONE ENCOUNTER
Date of Last Office Visit:   11/6/18 - provider; 12/6/18 - N.O  Date of Next Office Visit: 2/26/19  No shows since last visit: 1/29/19  Cancellations since last visit: 1/28/19 - Late Cx - weather  ED visits since last visit: 12/15/18 - chest pain    Medication Risperidone 0.5 mg date last ordered: 1/9/2019  Qty: 30  Refills: 2     risperiDONE (RISPERDAL) 0.5 MG tablet 30 tablet 2 11/6/2018     Sig - Route: Take 1 tablet (0.5 mg total) by mouth at bedtime. - Oral      Lapse in therapy greater than 7 days: no, according to pharmacy note last filled on 1/9/19  Medication refill request verified as identical to current order: no  Result of Last DAM, VPA, Li+ Level, CBC, or Carbamazepine Level (at or since last visit): N/A        []Eligibility - not seen in last year    []Supervision - no future appointment    []Compliance     []Verification - order discrepancy    []Controlled Medication    []90 - day supply request    [x]Other LPN pending medications    Current Medication list:      albuterol (PROAIR HFA;PROVENTIL HFA;VENTOLIN HFA) 90 mcg/actuation inhaler Inhale 2 puffs every 4 (four) hours as needed for wheezing.   benztropine (COGENTIN) 1 MG tablet Take 1 tablet (1 mg total) by mouth daily.   carBAMazepine (TEGRETOL) 200 mg tablet Take 200 mg by mouth 3 (three) times a day. 2 tabs (400mg) q AM, 1.5 tabs (300mg) q PM and HS   diphenhydrAMINE HCl 25 mg TbDL Take 25 mg by mouth at bedtime.   DULoxetine (CYMBALTA) 60 MG capsule Take 2 capsules (120 mg total) by mouth daily.   EPINEPHrine 0.15 mg/0.15 mL atIn Inject 1 each into the shoulder, thigh, or buttocks.   ethotoin (PEGANONE) 250 mg Tab Take 1,000 mg by mouth 3 (three) times a day.   levothyroxine (SYNTHROID) 200 MCG tablet Take 1 tablet (200 mcg total) by mouth daily.   nicotine polacrilex (NICORETTE) 2 mg gum Apply 1 each (2 mg total) to the mouth or throat as needed for smoking cessation.   omeprazole (PRILOSEC) 20 MG capsule Take 1 capsule by mouth daily before  breakfast (generic.Prilosec).   oxybutynin (DITROPAN XL) 10 MG ER tablet Take 1 tablet (10 mg total) by mouth daily.   polyethylene glycol (MIRALAX) 17 gram packet Take 17 g by mouth daily as needed.    QUEtiapine (SEROQUEL) 200 MG tablet Take 1 tablet (200 mg total) by mouth at bedtime (generic. Seroquel)   risperiDONE (RISPERDAL) 0.5 MG tablet Take 1 tablet (0.5 mg total) by mouth at bedtime.   rosuvastatin (CRESTOR) 20 MG tablet Take 1 tablet (20 mg total) by mouth daily.   senna (SENOKOT) 8.6 mg tablet Take 2 tablets by mouth 2 (two) times a day.   traZODone (DESYREL) 100 MG tablet Take 2 tablets (200 mg total) by mouth at bedtime.   VITAMIN D3 2,000 unit capsule Take 1 capsule by mouth daily (vitamin d3)   warfarin (COUMADIN) 5 MG tablet Take 2 tablet by mouth two days per week and take 1 and 1/2 tablet by mouth the other 5 days of the week       Medication Plan of Care at last office visit with MD/CNP:    PLAN:  HPI: RN note rev'd.  Med changes made at 11/6/18 appointment:  1.  Decrease Risperdal to 0.5 mg q hs  2.  Decrease Benadryl to 25mg q hs       [place], [place], and [place] : NA

## 2021-06-23 NOTE — PROGRESS NOTES
Home Health Nurse Xiao, and OLEGARIO Brambila were notified of Trazodone increase per Dr. Puente directive.

## 2021-06-23 NOTE — TELEPHONE ENCOUNTER
ANTICOAGULATION  MANAGEMENT    Assessment     Today's INR result of 2.80 is Therapeutic (goal INR of 2.0-3.0)        Warfarin taken as previously instructed    No new diet changes affecting INR    Potential interaction between stopped Oxycodone and increased Levothyroxine dose  and warfarin which may affect subsequent INRs    Continues to tolerate warfarin with no reported s/s of bleeding or thromboembolism     Previous INR was Therapeutic at 2.80 on 1/8/19.    Plan:     Spoke with Valentina regarding INR result and instructed:     Warfarin Dosing Instructions:  (has 5mg tabs)    - Continue current warfarin dose 7.5 mg daily.    Instructed patient to follow up no later than:  4 wks.   - scheduled to check on 3/12/19 during f/u with Dr. Rooney.    Education provided: importance of consistent vitamin K intake, target INR goal and significance of current INR result and importance of notifying clinic for changes in medications    Valentina verbalizes understanding and agrees to warfarin dosing plan.    Instructed to call the [facility] for any changes, questions or concerns. (#[phone])   ?   Shonna Ernandez RN    Subjective/Objective:      Valentina ORNELAS Juan Ramonjuan diego, a 58 y.o. female is on warfarin.     Valentina reports:     Home warfarin dose: as updated on anticoagulation calendar per template     Missed doses: No     Medication changes:  Yes:  Levothyroxine dose increased from 175mcg to 200mcg daily.  Also stopped Oxycodone.     S/S of bleeding or thromboembolism:  No     New Injury or illness:  Yes:  6 wks f/u today with Dr. Rooney r/t hypothyroidism.     Changes in diet or alcohol consumption:  No     Upcoming surgery, procedure or cardioversion:  No    Anticoagulation Episode Summary     Current INR goal:   2.0-3.0   TTR:   64.8 % (1.5 y)   Next INR check:   3/6/2019   INR from last check:   2.80 (2/6/2019)   Weekly max warfarin dose:      Target end date:      INR check location:      Preferred lab:      Send INR reminders to:    ANTICOAGULATION POOL C (DTN,VAD,CGR,GAV)    Indications    Pulmonary embolism with infarction (H) [I26.99]           Comments:            Anticoagulation Care Providers     Provider Role Specialty Phone number    Donald Rooney MD Referring Internal Medicine [phone]

## 2021-06-23 NOTE — PROGRESS NOTES
Patient is keeping all of her appointments and is aware of her upcoming appointments.  She continues to work with her  and is managing with her spend down.  She states she is smoking 1 cigarette a day sometimes none at all.    Next outreach due: 2/19/19

## 2021-06-23 NOTE — PROGRESS NOTES
Writer left a message for the patient to let her know that missing a group will not affect her because it was due to a bad weather.

## 2021-06-23 NOTE — PROGRESS NOTES
"  Illness Management and Recovery (IM&R) Group Note    Date of Service:1/21/2019 Start Time: 100  Stop Time: 2:00 Total Time: 60 minutes    Frequency: Monday    Type of the visit:  Group therapy     Session: 2 this year    N=3    Facilitator(s): CUBA Mckinnon; [facility]     Subjective: Patient was able to participate and benefit from treatment as evidenced by their  verbal expression and understanding of idea discussed.     Clinical assessment of mental status:   Valentina Olmedo presented on time.   She was oriented x3, open and cooperative, and dressed appropriately for this session and weather. Her memory was Mild .  Her speech was  Within normal.  Language was appropriate.  Concentration and focus is Brief. Psychosis is not noted or reported. She reports her mood is Jovial.  Affect is congruent with speech and is Congruent w/content of speech.  Fund of knowledge is adequate. Insight is adequate for therapy.     New Symptoms or Complaints: \" I feel sore from walking but it is a good thing to do\"     Reason(s) Patient is participating in the group: The IM&R group session was necessary for the care of the patient to address how symptoms of mental illness and or/addiction impact the patient s daily functioning and to learn healthy coping strategies.      No barriers to learning evidenced.     Progress toward short-term goals: The patient completed the weekly home assignment at (use lurkin scale score): 4/4 completed her weekly goal of working at least 3 times a week    Patient's impression of their current status:The patient indicated readiness to learn by the patient s choice to attend group.     Therapist impression of patients' current state: This 58 y.o. White or  female presents participated actively in today's IM&R group with following format:     [X Writer socialized informally and identified any major problem  [X] Reviewed the previous session:  Module 8; class 4: Strategies for coping " with stress  [X] Followed up on the weekly goals for each member using the Likert scale for goal achievement.   [X] Reinforced small success toward the weekly goal and discussed reported barriers  [X] Set agenda for current session  [X] Introduced the new topic on:Module 8: Class 5: Relaxed Breathing   [X] Used education/interactive strategies of inviting members to take tourn reading the new material.  [X] Used Cognitive Behavioral therapy skills as needed  [X]  Used Motivational Interviewing/ reflection/ elicit reactions/ questions, checked for understanding, and summarized today's content of material.  [X] Agreed on new weekly home assignment  [X] Announced the topic for the next session    Review of long term goals: Weekly review    Type of psychotherapeutic technique(s) provided: CBT, Education and Motivational Interviewing.     The patient was given an opportunity to ask questions and participate in the group discussion. The patient exhibited individual understanding by asking relevant questions and making appropriate comments to other group members.     Assessment tools used today include: How do you feel today?,Likert scale for goal achievement: 0= did nothing; 1. Thought about it; 2: did some of it; 3. Did most of it; 4 completed it.     Diagnosis:  1. Moderate major depression (H)    2. Generalized anxiety disorder    3. Chronic post-traumatic stress disorder (PTSD)      No indication of SI, plan and/or means.     Psychotherapeutic Techniques: A cognitive behavior, Motivational Interviewing, and Education.     Plan and Follow-up :Continue to attend groups and work on recovery goal.    Discharge Criteria Planning: Decrease  health symptoms and hospitalizations.    Provider: Performed and documented by ARTURO Mckinnon; [facility] 1/21/2019

## 2021-06-23 NOTE — PROGRESS NOTES
"Mental Health Visit Note    1/31/2019    Start time: 1:01pm    Stop Time: 1:51pm  Session # 2    Valentina Olmedo is a 58 y.o. female is being seen today for   Chief Complaint   Patient presents with      Follow Up     Depression     somewhat improved but still has poor energy and motivation     Anxiety     improved   .     New symptoms or complaints: None     Functional Impairment:   Personal: 3  Family: 2 (no contact with family apart from spouse)  Work: 4  Social: 3 (no social support apart from , but has joined [facility] Recovery group and is exploring social drop in group    Clinical assessment of mental status: (Same mental status as 1/17/2019) Patient's grooming is Well groomed, his attire is Appropriate, and his age appears Appears Stated. Behavior towards examiner is Cooperative, motor activity is Within normal, and eye contact is Staring.  Patient's mood is Euthymic, and affect is Blunted and Flat.  Speech/language is Delayed/Hesitant, attention is Distractible, and concentration is Brief. Thought process is Slow, thought content is Within noraml and  Within normal.  Patient's orientation is X 3, memory is  Impairment and Recent, judgement is Impairment and Minimal, and estimated intelligence is Below Average. Demonstrated insight is Adequate while fund of knowledge is adequate.    Suicidal/Homicidal Ideation present: None reported.    Patient's impression of their current status:   Patient reports anxiety and depression are improved somewhat overall.  She still struggles with energy and motivation, only getting up and dressed about half of the days that she does not have appointments.  Her mental health  has helped her identify a social drop in center. \"I don't know if I'm going to do it.\"  Admits to some anxiety about meeting new people.     Patient reports some follow-up with therapy homework: Patient walked the length of her building x2 a couple times since last session.  She worked with " "her  on identifying options for social support.  She continues to attend mental illness and recovery of therapy group and participates in goal setting.  States that she only gets up and dressed about half of the days that she does not have an appointment.  Did not attempt any walking in the community.    Therapist impression of patients current state: Patient continues to make mild improvement in anxiety and depressive symptoms.  As noted, overall improvement from 1 year ago.  She is somewhat better able to activate and has improved ability to discuss concerns rather than staying blocked and overwhelmed.  She appears less tense and anxious.  Making limited efforts to increase structure and support via mental health therapy group here at [facility] as well as considering a social drop in center in her community.  She is maintaining herself in the community without crisis or mental health emergency.    Processed negative cognitions, reinforced strengths, validated patient efforts and feelings.  Discussed pros and cons of social group: patient identifies possibility of  postive impact on depression if she has more to do and sees more people. Says she worries they will play a game \"I can't stand\".  Brainstormed how she could sit out a game while she hated while still making use of social support/companionship.  Discussed benefits of using social group every week while she gets to know the other participants.     Type of psychotherapeutic technique provided: Client centered, Solution-focused and CBT      Progress toward short term goals: Progress as expected:  Patient walked the length of her building x2 a couple times since last session.  She worked with her  on identifying options for social support.  She continues to attend mental illness and recovery of therapy group and participates in goal setting.  States that she only gets up and dressed about half of the days that " she does not have an appointment.  Did not attempt any walking in the community.    Review of long term goals: Not done at today's visit and Date of last review 12/6/2018    Diagnosis:   1. Moderate major depression (H)    2. Generalized anxiety disorder    3. Chronic post-traumatic stress disorder (PTSD)    4. Borderline personality disorder in adult (H)        Plan and Follow up: Patient will return for follow up in four weeks.  She will continue to work with  to at least try out the social drop in center.  She will continue mental illness and recovery group and participate in goal setting.  She will continue efforts to get up and dressed and get out of the bedroom during the daytime.  She will attempt gentle walking with excursions into the community 2 times per week.    Discharge Criteria/Planning: Client has chronic symptoms and ongoing therapy for maintenance stability recommended.    Loretta BRINK, LICSW  1/31/2019

## 2021-06-23 NOTE — PROGRESS NOTES
Office Visit - Follow Up   Valentina Olmedo   58 y.o. female    Date of Visit: 2/6/2019    Chief Complaint   Patient presents with     Thyroid Problem     Toes are numb        Assessment and Plan   1. Hypothyroidism due to acquired atrophy of thyroid  Unsure what dose of levothyroxine she is taking  - Thyroid Stimulating Hormone (TSH)    2. Malignant neoplasm of thyroid gland (H)  Status post resection, continue levothyroxine    3. Schizoaffective disorder, depressive type (H)  Stable continue current medications and psychiatry follow-up    5. Adrenal mass (H)  Status post resection, benign    6. Chronic obstructive pulmonary disease, unspecified COPD type (H)  7. Nicotine dependence, cigarettes, with other nicotine-induced disorders  Looking cessation advised, continue inhalers    8. Nonintractable epilepsy without status epilepticus, unspecified epilepsy type (H)  Continue antiseizure medication    9. Polyneuropathy  Diabetes marker has been okay  - Vitamin B12    10. DVT (deep venous thrombosis) (H)  11. Pulmonary embolism (H)  Continue warfarin  - INR    12. Morbid obesity with BMI of 45.0-49.9, adult (H)  The following high BMI interventions were performed this visit: encouragement to exercise and lifestyle education regarding diet    Return in about 4 weeks (around 3/6/2019) for recheck.     History of Present Illness   This 58 y.o. old woman comes in for follow-up of numerous medical problems.  Overall she is doing better.  Back pain is improved.  She has some numbness in her toes.  She is unsure if she made changes to her thyroid medication.  Breathing stable.  No chest pain or shortness of breath.  Continues on anticoagulation without difficulty.  No recent seizures.    Review of Systems: A comprehensive review of systems was negative except as noted.     Medications, Allergies and Problem List   Reviewed, reconciled and updated     Physical Exam   General Appearance:   No acute distress    /84  "(Patient Site: Left Arm, Patient Position: Sitting, Cuff Size: Adult Regular)   Pulse 87   Ht 5' 6\" (1.676 m)   Wt (!) 328 lb (148.8 kg)   SpO2 98%   BMI 52.94 kg/m      HEENT exam is unremarkable  Neck supple no thyromegaly or nodule palpable  Lymphatic no cervical lymphadenopathy  Cardiovascular regular rate and rhythm no murmur gallop or rub  Pulmonary lungs are clear to auscultation bilaterally  Gastrointestinall abdomen soft nontender nondistended no organomegaly  Neurologic exam is non focal  Psychiatric pleasant, no confusion or agitation        Additional Information   Current Outpatient Medications   Medication Sig Dispense Refill     albuterol (PROAIR HFA;PROVENTIL HFA;VENTOLIN HFA) 90 mcg/actuation inhaler Inhale 2 puffs every 4 (four) hours as needed for wheezing. 1 Inhaler 6     benztropine (COGENTIN) 1 MG tablet Take 1 tablet (1 mg total) by mouth daily. 30 tablet 6     carBAMazepine (TEGRETOL) 200 mg tablet Take 200 mg by mouth 3 (three) times a day. 2 tabs (400mg) q AM, 1.5 tabs (300mg) q PM and HS       diphenhydrAMINE HCl 25 mg TbDL Take 25 mg by mouth at bedtime. 30 each 2     DULoxetine (CYMBALTA) 60 MG capsule Take 2 capsules (120 mg total) by mouth daily. 60 capsule 4     EPINEPHrine 0.15 mg/0.15 mL atIn Inject 1 each into the shoulder, thigh, or buttocks.       ethotoin (PEGANONE) 250 mg Tab Take 1,000 mg by mouth 3 (three) times a day.       levothyroxine (SYNTHROID) 200 MCG tablet Take 1 tablet (200 mcg total) by mouth daily. 30 tablet 11     nicotine polacrilex (NICORETTE) 2 mg gum Apply 1 each (2 mg total) to the mouth or throat as needed for smoking cessation. 220 each 2     omeprazole (PRILOSEC) 20 MG capsule Take 1 capsule by mouth daily before breakfast (generic.Prilosec). 30 capsule 10     oxybutynin (DITROPAN XL) 10 MG ER tablet Take 1 tablet (10 mg total) by mouth daily. 30 tablet 5     polyethylene glycol (MIRALAX) 17 gram packet Take 17 g by mouth daily as needed.        " QUEtiapine (SEROQUEL) 200 MG tablet Take 1 tablet (200 mg total) by mouth at bedtime (generic. Seroquel) 30 tablet 4     risperiDONE (RISPERDAL) 0.5 MG tablet Take 1 tablet (0.5 mg total) by mouth at bedtime. 30 tablet 2     rosuvastatin (CRESTOR) 20 MG tablet Take 1 tablet (20 mg total) by mouth daily. 90 tablet 3     senna (SENOKOT) 8.6 mg tablet Take 2 tablets by mouth 2 (two) times a day.       traZODone (DESYREL) 100 MG tablet Take 2 tablets (200 mg total) by mouth at bedtime. 60 tablet 2     VITAMIN D3 2,000 unit capsule Take 1 capsule by mouth daily (vitamin d3) 30 capsule 10     warfarin (COUMADIN) 5 MG tablet Take 2 tablet by mouth two days per week and take 1 and 1/2 tablet by mouth the other 5 days of the week 50 tablet 19     No current facility-administered medications for this visit.      Allergies   Allergen Reactions     Aspirin (Tartrazine Only) Shortness Of Breath     Can have asa as long as no yellow dye     Gabapentin Rash     Guilherme Johnsons rash     Venom-Honey Bee Anaphylaxis     Vistaril [Hydroxyzine Pamoate] Other (See Comments)     Excessive sedation     Yellow Dye Shortness Of Breath     Linked only to tartazine.     Hydroxyzine Other (See Comments)     Sluggish; unable to wake up     Latex Itching     Other Environmental Allergy      Dust, Mold, Pollen     Phenobarbital Other (See Comments)     unknown     Latex Itching     Social History     Tobacco Use     Smoking status: Current Every Day Smoker     Packs/day: 2.00     Years: 35.00     Pack years: 70.00     Types: Cigarettes     Start date: 6/10/1974     Smokeless tobacco: Never Used     Tobacco comment: smoking 1-2 a day   Substance Use Topics     Alcohol use: No     Drug use: No       Review and/or order of clinical lab tests:  Review and/or order of radiology tests:  Review and/or order of medicine tests:  Discussion of test results with performing physician:  Decision to obtain old records and/or obtain history from someone other  than the patient:  Review and summarization of old records and/or obtaining history from someone other than the patient and.or discussion of case with another health care provider:  Independent visualization of image, tracing or specimen itself:    Time:      Donald Rooney MD

## 2021-06-24 NOTE — TELEPHONE ENCOUNTER
ANTICOAGULATION  MANAGEMENT: Discharge Continuity of Care Review    Emergency room visit on  3/11/19 for FALL / right foot pain with bruising   - clossed displaced fracture of 5th metatarsal bone of right foot.   - FELL; slipped on ice and falling backwards with right foot caughter underneath the tire.   - denied stricking her head, neck or back.   - fitted with CAM walker with partial weightbearing instructions and walker.   - CT scan of head obtained.    Discharge disposition: Home    INR Results:       Recent labs: (last 7 days)     03/11/19  1720   INR 1.84*       Warfarin inpatient management: Home regimen continued    Warfarin discharge instructions: home regimen continued     Medication Changes Affecting Anticoagulation: No  Oxycodone 5mg one tab q4 hrs PRN.    Additional Factors Affecting Anticoagulation: Yes:  She was advised to monitor for developing signs of intracranial bleeding due to being on warfarin.    Plan     No adjustment to anticoagulation plan needed    Recommended follow up is scheduled    Anticoagulation calendar updated - scheduled on 3/20/19 during f/u visit with Dr. Toribio Ernandez RN

## 2021-06-24 NOTE — PROGRESS NOTES
Still smoking 1-2 cigarettes a day, states she isn't ready yet to completely quit.   She is working with her  who takes care of her applications for insurance and other county services.     Next outreach due: 4/5/19

## 2021-06-24 NOTE — TELEPHONE ENCOUNTER
OLEGARIO called and spoke to Jany complaining of pt has not been sleeping well and being constipated. She is asking for new prescriptions for senna and melatonin. OLEGARIO said that pt was taking 6 mg Q HS. Senna listed as historical med, melatonin is not on her current med list. Please, advise    OLEGARIO Ph: [phone]

## 2021-06-24 NOTE — TELEPHONE ENCOUNTER
Per Dr. Puente's directive:     Plan  RN to call patient and let patient know:  - I will give 1 month Rx for senna. Further refills need to go to PCP.  -I will prescribe only 3 mg melatonin (not 6 mg).     Call placed to Valentina, and explained the above to her.  Understood, she said that future fills of the Senna would need to be through her primary MD, and agreed to only Melatonin 3 mg maximum at HS, and that scripts were sent to [facility], and which [facility].

## 2021-06-24 NOTE — PROGRESS NOTES
Illness Management and Recovery (IM&R) Group Note    Date of Service:2/18/2019 Start Time: 100  Stop Time: 2:00 Total Time: 60 minutes    Frequency: Monday    Type of the visit:  Group therapy     Session:3 this year    N=5    Facilitator(s): CUBA Mckinnon -BELK[place]; [facility]     Subjective: Patient was able to participate and benefit from treatment as evidenced by their  verbal expression and understanding of idea discussed.     Clinical assessment of mental status:   Valentina Olmedo presented on time.   She was oriented x3, open and cooperative, and dressed appropriately for this session and weather. Her memory was Mild .  Her speech was  Within normal.  Language was appropriate.  Concentration and focus is Brief. Psychosis is not noted or reported. She reports her mood is Jovial.  Affect is congruent with speech and is Congruent w/content of speech.  Fund of knowledge is adequate. Insight is adequate for therapy.      New Symptoms or Complaints: Working on healthy life styles especially, eating well and exercise; also wanting to quit smoking completely     Reason(s) Patient is participating in the group: The IM&R group session was necessary for the care of the patient to address how symptoms of mental illness and or/addiction impact the patient s daily functioning and to learn healthy coping strategies.      No barriers to learning evidenced.     Progress toward short-term goals: The patient completed the weekly home assignment at (use lurkin scale score):2/4- did not do much due to rough weather. Has been able to maintain 1 cigarette a day and plans to quit completely.     Patient's impression of their current status:The patient indicated readiness to learn by the patient s choice to attend group.     Therapist impression of patients' current state: This 58 y.o. White or  female presents participated actively in today's IM&R group with following format:     [X Writer socialized informally and  identified any major problem  [X] Reviewed the previous session:  Module 8 review  [X] Followed up on the weekly goals for each member using the Likert scale for goal achievement.   [X] Reinforced small success toward the weekly goal and discussed reported barriers  [X] Set agenda for current session  [X] Introduced the new topic on:Module; goal review   [X] Used education/interactive strategies of inviting members to take tourn reading the new material.  [X] Used Cognitive Behavioral therapy skills as needed  [X]  Used Motivational Interviewing/ reflection/ elicit reactions/ questions, checked for understanding, and summarized today's content of material.  [X] Agreed on new weekly home assignment  [X] Announced the topic for the next session    Review of long term goals: Weekly review    Type of psychotherapeutic technique(s) provided: CBT, Education and Motivational Interviewing.     The patient was given an opportunity to ask questions and participate in the group discussion. The patient exhibited individual understanding by asking relevant questions and making appropriate comments to other group members.     Assessment tools used today include: How do you feel today?,Likert scale for goal achievement: 0= did nothing; 1. Thought about it; 2: did some of it; 3. Did most of it; 4 completed it.     Diagnosis:  1. Moderate major depression (H)    2. Generalized anxiety disorder    3. Chronic post-traumatic stress disorder (PTSD)      No indication of SI, plan and/or means.     Psychotherapeutic Techniques: A cognitive behavior, Motivational Interviewing, and Education.     Plan and Follow-up :Continue to attend groups and work on recovery goal.    Discharge Criteria Planning: Decrease  health symptoms and hospitalizations.    Provider: Performed and documented by ARTURO Mckinnon; [facility] 2/18/2019

## 2021-06-24 NOTE — PATIENT INSTRUCTIONS - HE
Talk to Dr Rooney about your weight and referral to:  -Bariatric Clinic  -Nutritionist    Please consider Sleep clinic appt

## 2021-06-24 NOTE — PROGRESS NOTES
"  Illness Management and Recovery (IM&R) Group Note    Date of Service:3/11/2019 Start Time: 100  Stop Time: 2:00 Total Time: 60 minutes    Frequency: Monday    Type of the visit:  Group therapy     Session: 4    N=4    Facilitator(s): CUBA Mckinnon; [facility]     Subjective: Patient was able to participate and benefit from treatment as evidenced by their  verbal expression and understanding of idea discussed.     Clinical assessment of mental status:   I've re-evaluated this with the patient and no changes were noted on 3/12/2019. Valentina Olmedo presented on time.   She was oriented x3, open and cooperative, and dressed appropriately for this session and weather. Her memory was Mild .  Her speech was  Within normal.  Language was appropriate.  Concentration and focus is Brief. Psychosis is not noted or reported. She reports her mood is Jovial.  Affect is congruent with speech and is Congruent w/content of speech.  Fund of knowledge is adequate. Insight is adequate for therapy.     New Symptoms or Complaints: \" I feel between the car and snow bank. I hurt my toes\"    Reason(s) Patient is participating in the group: The IM&R group session was necessary for the care of the patient to address how symptoms of mental illness and or/addiction impact the patient s daily functioning and to learn healthy coping strategies.      No barriers to learning evidenced.     Progress toward short-term goals: The patient completed the weekly home assignment at (use lurkin scale score): 2/4- has been sick due to a fall around her apt.     Patient's impression of their current status:The patient indicated readiness to learn by the patient s choice to attend group.     Therapist impression of patients' current state: This 59 y.o. White or  female presents participated actively in today's IM&R group with following format:     [X Writer socialized informally and identified any major problem  [X] Reviewed the previous " session: goal review  [X] Followed up on the weekly goals for each member using the Likert scale for goal achievement.   [X] Reinforced small success toward the weekly goal and discussed reported barriers  [X] Set agenda for current session  [X] Introduced the new topic on: Art work with potted flowers.    [X] Used education/interactive strategies of inviting members to take tourn reading the new material.  [X] Used Cognitive Behavioral therapy skills as needed  [X]  Used Motivational Interviewing/ reflection/ elicit reactions/ questions, checked for understanding, and summarized today's content of material.  [X] Agreed on new weekly home assignment  [X] Announced the topic for the next session    Review of long term goals: Weekly review    Type of psychotherapeutic technique(s) provided: CBT, Education and Motivational Interviewing.     The patient was given an opportunity to ask questions and participate in the group discussion. The patient exhibited individual understanding by asking relevant questions and making appropriate comments to other group members.     Assessment tools used today include: How do you feel today?,Likert scale for goal achievement: 0= did nothing; 1. Thought about it; 2: did some of it; 3. Did most of it; 4 completed it.     Diagnosis:  1. Moderate major depression (H)    2. Chronic post-traumatic stress disorder (PTSD)      No indication of SI, plan and/or means.     Psychotherapeutic Techniques: A cognitive behavior, Motivational Interviewing, and Education.     Plan and Follow-up :Continue to attend groups and work on recovery goal.    Discharge Criteria Planning: Decrease  health symptoms and hospitalizations.    Provider: Performed and documented by ARTURO Mckinnon; [facility] 3/11/2019

## 2021-06-24 NOTE — TELEPHONE ENCOUNTER
I never have prescribed senna or melatonin for pt.  Per med history, melatonin 3 mg q hs last prescribed by Dr. Emerita Oneill in February 2016. No record of 6 mg dose.    Plan  RN to call patient and let patient know:  - I will give 1 month Rx for senna. Further refills need to go to PCP.  -I will prescribe only 3 mg melatonin (not 6 mg).

## 2021-06-24 NOTE — TELEPHONE ENCOUNTER
Home Health re-certification and Treatment Plan received via fax from [facility].  Labeled and placed in Dr. Puente's mailbox to review, sign and fax back to [phone]

## 2021-06-24 NOTE — PROGRESS NOTES
Writer called the patient and  about the group.  stated the patient has been sick and he is not able to leave her alone to come to the group. He also stated he and patient would rather stay home because they do not think they are doing well in a  Group setting. Writer respect the patient and 's self determination and will close for this IMR group.

## 2021-06-24 NOTE — TELEPHONE ENCOUNTER
Who is calling:  Xiao  Reason for Call:  Information regarding dosage and results  Date of last appointment with primary care:    Has the patient been recently seen:    Okay to leave a detailed message: Yes    Has only been seeing patient for a month. Wants to verify dosing and results.

## 2021-06-24 NOTE — TELEPHONE ENCOUNTER
Called and spoke with RAJEEV Haegn from [facility].     - last INR was therapeutic at 2.80 on 2/6/19.     - continues with 7.5mg warfarin daily.     - next INR check is on 3/12/19 during f/u visit with Dr. Rooney

## 2021-06-24 NOTE — PROGRESS NOTES
"Date of Service:  2019    Name:  Valentina Olmedo  :  1960  MRN:  [id number]    HPI:   Valentina Olmedo 59 y.o. female with neurocognitive deficits, borderline personality disorder, major depressive disorder, seizure disorder, CHRIS, hypothyroid, on warfarin for PE. She is accompanied by Jose F Milan CM, \"Patty\".   Last seen in Nov.    ROS: Sleep improved with increased trazodone dose.  More alert, cognition improved.   Wt gain 323>331# (since Nov).  Quit smoking.     Therapy-individual therapy with Loretta.     [facility]- started in Dec. Her  attends this group too.    Neurocognitive deficits  Neuropsych testing done (3/20/18).  Note excerpt:  \"Implications/recommendations:  Her current test performance demonstrates significant decline relative to last evaluation in .    The possibility of significant medication side effects secondary to her anti-epileptic and psychotropic drug regime should be explored, as well possible onset of dementia.   Follow-up by neuropsychology should be considered if a medication adjustment results in some improvement in her presentation.\"   -Excerpt by Irais De La Cruz PhD at [facility],  (3/20/18)  ---------------------------------------------------------------------------------------------------------------------------    Social  Lives with  in [facility].  Has 3 cats-Yulisa Camarillo Parker.    Harry Ellis [phone]  Weekly Med set- up.      Medical  Seizure disorder, Neurologist Praveena Benitez, [facility]    CHRIS, Dx'd at [facility] by Benjie Allan MD 12/10/2013, \"AutoCPAP 5-20 cms of water is recommended, while patient sleeps\".  Sleep study done at [facility]- \"Pt did not have any significant sleep apnea during her test\", 4/6/15 Fernando Montaño MD     Hypothyroid    On warfarin for P.E, 2017       Medications:       Current Outpatient Medications on File Prior to Visit   Medication Sig " Dispense Refill     albuterol (PROAIR HFA;PROVENTIL HFA;VENTOLIN HFA) 90 mcg/actuation inhaler Inhale 2 puffs every 4 (four) hours as needed for wheezing. 1 Inhaler 6     benztropine (COGENTIN) 1 MG tablet Take 1 tablet (1 mg total) by mouth daily. 30 tablet 6     carBAMazepine (TEGRETOL) 200 mg tablet Take 200 mg by mouth 3 (three) times a day. 2 tabs (400mg) q AM, 1.5 tabs (300mg) q PM and HS       diphenhydrAMINE HCl 25 mg TbDL Take 25 mg by mouth at bedtime. 30 each 2     DULoxetine (CYMBALTA) 60 MG capsule Take 2 capsules (120 mg total) by mouth daily. 60 capsule 4     ethotoin (PEGANONE) 250 mg Tab Take 1,000 mg by mouth 3 (three) times a day.       levothyroxine (SYNTHROID) 200 MCG tablet Take 1 tablet (200 mcg total) by mouth daily. 30 tablet 11     nicotine polacrilex (NICORETTE) 2 mg gum Apply 1 each (2 mg total) to the mouth or throat as needed for smoking cessation. 220 each 2     omeprazole (PRILOSEC) 20 MG capsule Take 1 capsule by mouth daily before breakfast (generic.Prilosec). 30 capsule 10     oxybutynin (DITROPAN XL) 10 MG ER tablet Take 1 tablet (10 mg total) by mouth daily. 30 tablet 5     QUEtiapine (SEROQUEL) 200 MG tablet Take 1 tablet (200 mg total) by mouth at bedtime (generic. Seroquel) 30 tablet 4     risperiDONE (RISPERDAL) 0.5 MG tablet Take 1 tablet (0.5 mg total) by mouth at bedtime. 30 tablet 2     rosuvastatin (CRESTOR) 20 MG tablet Take 1 tablet (20 mg total) by mouth daily. 90 tablet 3     traZODone (DESYREL) 100 MG tablet Take 2 tablets (200 mg total) by mouth at bedtime. 60 tablet 2     VITAMIN D3 2,000 unit capsule Take 1 capsule by mouth daily (vitamin d3) 30 capsule 10     warfarin (COUMADIN) 5 MG tablet Take 2 tablet by mouth two days per week and take 1 and 1/2 tablet by mouth the other 5 days of the week 50 tablet 19     EPINEPHrine 0.15 mg/0.15 mL atIn Inject 1 each into the shoulder, thigh, or buttocks.       polyethylene glycol (MIRALAX) 17 gram packet Take 17 g by  mouth daily as needed.        senna (SENOKOT) 8.6 mg tablet Take 2 tablets by mouth 2 (two) times a day.       No current facility-administered medications on file prior to visit.           Associated Clinical Documents:       Notes reviewed in EPIC and [facility] including: medication reconciliation, nurses's notes, progress notes, recent labs, PMH, and OSH records.    ROS:       10 point ROS was negative except for the items listed in HPI.      MSE:      Vital signs: refer to nursing notes from today.   Alert & oriented x 3.  Smiles, good eye contact  Appearance: Appears stated age, casually dressed.  Speech: Normal rate, rhythm and tone.  Gait: Normal.  Musculoskeletal:  no abnormal movements.  Mood/Affect: brighter  Thought Process: circumferential  Thought Content: Occ passive SI, no plan. No homicide ideation.  Associations: Intact, no delusions.  Perceptions: See HPI  Memory: recent and remote memory intact, not formally tested  Attention span and concentration: Fair  Language: Intact.  Fund of Knowledge: Normal.  Insight and Judgement: Adequate    Impression:        1. Neurocognitive deficits-improved   2. major depressive disorder, recurrent, currently in remission.   3. CHRIS-declines sleep appointment, does not want CPAP.  4.  Nicotine addiction- quit smoking      Plan:       1.  Teaching done on sleep apnea.  Encouraged  to attend sleep clinic appointment.  2.  PCP appointment with Dr. Rooney on 3/12.  Encouraged to discuss bariatric and dietitian referral.       (pt declined his referral in November 2016).   3.  Encouraged exercise  4.  Continue meds as above. MD appt 2-3 mos.  5.  Continue therapy  6.  Consider repeat neuropsych test (improved cognition with med changes)      Total Time and Coordination of Care:       25 Minutes spent in the care of this patient with >50% of this time was spent in   Face-to-face counseling specifically discussing and educating about sleep apnea, sleep clinic appointment, diet,  exercise, bariatric surgery.  Teaching done on pharmacologic and psycho-social treatment options, the risks, benefits and side effects of medication,.      This dictation was completed with speech recognition software and there may be unintended word substitutions.

## 2021-06-25 NOTE — TELEPHONE ENCOUNTER
Fax received from Sarah Haywood RN from   [facility].    Form for Home Health Care and Treatment Plan needs to be completed and faxed back.    Form labeled and put in providers clinic mail box

## 2021-06-25 NOTE — PROGRESS NOTES
"  Office Visit - Follow Up   Valentina Olmedo   59 y.o. female    Date of Visit: 3/20/2019    Chief Complaint   Patient presents with     Hospital Visit Follow Up        Assessment and Plan   1. Fall, subsequent encounter  Continue with Cam boot, acetaminophen for pain control  - Ambulatory referral to Orthopedic Surgery    2. Closed nondisplaced fracture of fifth metatarsal bone of right foot with routine healing, subsequent encounter  - Ambulatory referral to Orthopedic Surgery    3. Essential hypertension with goal blood pressure less than 140/90  Blood pressure controlled continue current plan    4. Pulmonary embolism with infarction (H)  She is on anticoagulation was contributed to some of her bruising and swelling.  She should continue taking this    5. Morbid obesity with BMI of 45.0-49.9, adult (H)  The following high BMI interventions were performed this visit: lifestyle education regarding diet    Return in about 4 weeks (around 4/17/2019) for recheck.     History of Present Illness   This 59 y.o. old woman comes with her  for hospital follow-up.  She slipped and fell on the ice.  She fractured her fifth metatarsal right foot.  She is in a cam boot.  She is able to ambulate with this.  Pain generally controlled with acetaminophen.  She only took one oxycodone.  She is worried about side effects with other medications.  She does not have an appoint with her pubic surgery.  She did not hit her head.  Head CT looked okay.  Otherwise feeling okay.    Review of Systems: A comprehensive review of systems was negative except as noted.     Medications, Allergies and Problem List   Reviewed, reconciled and updated     Physical Exam   General Appearance:   No acute distress    /68 (Patient Site: Left Arm, Patient Position: Sitting, Cuff Size: Adult Regular)   Pulse 84   Ht 5' 6\" (1.676 m)   Wt (!) 328 lb (148.8 kg)   SpO2 98%   BMI 52.94 kg/m      She has swelling of her right foot.  Sensation and " motor intact.  Palpable pulse.     Additional Information   Current Outpatient Medications   Medication Sig Dispense Refill     albuterol (PROAIR HFA;PROVENTIL HFA;VENTOLIN HFA) 90 mcg/actuation inhaler Inhale 2 puffs every 4 (four) hours as needed for wheezing. 1 Inhaler 6     benztropine (COGENTIN) 1 MG tablet Take 1 tablet (1 mg total) by mouth daily. 30 tablet 6     carBAMazepine (TEGRETOL) 200 mg tablet Take 200 mg by mouth 3 (three) times a day. 2 tabs (400mg) q AM, 1.5 tabs (300mg) q PM and HS       diphenhydrAMINE HCl 25 mg TbDL Take 25 mg by mouth at bedtime. 30 each 6     DULoxetine (CYMBALTA) 60 MG capsule Take 2 capsules (120 mg total) by mouth daily. 60 capsule 6     EPINEPHrine 0.15 mg/0.15 mL atIn Inject 1 each into the shoulder, thigh, or buttocks.       ethotoin (PEGANONE) 250 mg Tab Take 1,000 mg by mouth 3 (three) times a day.       levothyroxine (SYNTHROID) 200 MCG tablet Take 1 tablet (200 mcg total) by mouth daily. 30 tablet 11     melatonin 3 mg Tab tablet Take 1 tablet (3 mg total) by mouth at bedtime as needed. 30 tablet 2     nicotine polacrilex (NICORETTE) 2 mg gum Apply 1 each (2 mg total) to the mouth or throat as needed for smoking cessation. 220 each 2     omeprazole (PRILOSEC) 20 MG capsule Take 1 capsule by mouth daily before breakfast (generic.Prilosec). 30 capsule 10     oxybutynin (DITROPAN XL) 10 MG ER tablet Take 1 tablet (10 mg total) by mouth daily. 30 tablet 5     polyethylene glycol (MIRALAX) 17 gram packet Take 17 g by mouth daily as needed.        QUEtiapine (SEROQUEL) 200 MG tablet Take 1 tablet (200 mg total) by mouth at bedtime (generic. Seroquel) 30 tablet 6     risperiDONE (RISPERDAL) 0.5 MG tablet Take 1 tablet (0.5 mg total) by mouth at bedtime. 30 tablet 2     rosuvastatin (CRESTOR) 20 MG tablet Take 1 tablet (20 mg total) by mouth daily. 90 tablet 3     senna (SENOKOT) 8.6 mg tablet Take 2 tablets by mouth 2 (two) times a day. 120 tablet 0     traZODone (DESYREL)  100 MG tablet Take 2 tablets (200 mg total) by mouth at bedtime. 60 tablet 6     VITAMIN D3 2,000 unit capsule Take 1 capsule by mouth daily (vitamin d3) 30 capsule 10     warfarin (COUMADIN) 5 MG tablet Take 2 tablet by mouth two days per week and take 1 and 1/2 tablet by mouth the other 5 days of the week 50 tablet 19     No current facility-administered medications for this visit.      Allergies   Allergen Reactions     Aspirin (Tartrazine Only) Shortness Of Breath     Can have asa as long as no yellow dye     Gabapentin Rash     Guilherme Johnsons rash     Venom-Honey Bee Anaphylaxis     Vistaril [Hydroxyzine Pamoate] Other (See Comments)     Excessive sedation     Yellow Dye Shortness Of Breath     Linked only to tartazine.     Hydroxyzine Other (See Comments)     Sluggish; unable to wake up     Latex Itching     Other Environmental Allergy      Dust, Mold, Pollen     Phenobarbital Other (See Comments)     unknown     Latex Itching     Social History     Tobacco Use     Smoking status: Former Smoker     Packs/day: 2.00     Years: 35.00     Pack years: 70.00     Types: Cigarettes     Start date: 6/10/1974     Smokeless tobacco: Never Used     Tobacco comment: smoking 1-2 a week   Substance Use Topics     Alcohol use: No     Drug use: No       Review and/or order of clinical lab tests:  Review and/or order of radiology tests:  Review and/or order of medicine tests:  Discussion of test results with performing physician:  Decision to obtain old records and/or obtain history from someone other than the patient:  Review and summarization of old records and/or obtaining history from someone other than the patient and.or discussion of case with another health care provider:  Independent visualization of image, tracing or specimen itself:    Time:      Donald Rooney MD

## 2021-06-25 NOTE — TELEPHONE ENCOUNTER
Writer faxed back to [facility]: Jazzmine Haywood RN @ [phone]      Fax confirmation received and paperwork sent to scanning.

## 2021-06-25 NOTE — TELEPHONE ENCOUNTER
Called and spoke with Valentina.     - INR scheduled on Wed. 3/20/19 during Hospital f/u with Dr. Rooney.

## 2021-06-25 NOTE — TELEPHONE ENCOUNTER
Who is calling:  Patient  Reason for Call:  Patient would like call back from [facility] regarding INR results from 03/12/19.  Date of last appointment with primary care: none  Okay to leave a detailed message: Yes

## 2021-06-26 NOTE — PROGRESS NOTES
"Mental Health Psychotherapy Telephone Note    Patient: Valentina Olmedo    : 1960 MRN: [id number]    Completed a 2 point identification verification: patient verbalized full name and date of birth.     Date: 2021  Start time: 11:04am   Stop Time: 11:49   Session # 5    The patient has been notified of the following:   \"We have found that certain health care needs can be provided without the need for a face to face visit.  This service lets us provide the care you need with a phone conversation.  I will have full access to your [facility] medical record during this entire phone call.   I will be taking notes for your medical record. Since this is like an office visit, we will bill your insurance company for this service.  There are potential benefits and risks of telephone visits (e.g. limits to patient confidentiality) that differ from in-person visits.?  Confidentiality still applies for telephone services, and nobody will record the visit.  It is important to be in a quiet, private space that is free of distractions (including cell phone or other devices) during the visit.?? If during the course of the call I believe a telephone visit is not appropriate, you will not be charged for this service\"  Consent has been obtained for this service by care team member: Yes, per verbal agreement   Session Type: Patient is participating in a telemedicine phone visit.  She does not utilize smart phone for video visit, does not have wifi for video visit.    Those present for this session: patient, therapist    Chief Complaint   Patient presents with     MH Follow Up     Telephone visit     Depression     Low mood,chronic and acute in the context of poor sleep     Anxiety     anxiety with difficulty sleep, medical complaints     PTSD     chronic with associated triggers     Memory Loss     mildly compromised thought processes       New symptoms or complaints: Worsened depression  in the context of poor sleep, c/o " "allergies, difficulty with CPAP    Functional Impairment:   Personal: 3  Family: 2  Work: 4  Social:4          ASSESSMENT: Current Emotional / Mental Status (status of significant symptoms):              Risk status (Self / Other harm or suicidal ideation)              Patient reports efforts to stay safe during pandemic with social distancing, face mask.              Patient denies current or recent suicidal ideation or behaviors.              Patient denies current or recent homicidal ideation or behaviors.              Patient denies current or recent self injurious behavior or ideation.              Patient denies other safety concerns.              Patient denies change in risk factors.              Patient denies change in protective factors. States her spouse and \"not wanting to be committed\" are protective factors.              Recommended that patient call 911 or go to the local ED should there be a change in any of these risk factors.                Attitude:                                   Cooperative  Sunil              Orientation:                             Oriented x3              Speech                          Rate / Production:       Normal/ Responsive                          Volume:                       Normal               Mood:                                      Anxious  Euthymic              Thought Content:                    Clear               Thought Form:                        Coherent  Logical               Insight:                                     Fair  and External locus      Patient's impression of their current status:   \"I'm not very good.  I'm not sleeping.\"  \"I get frustrated with myself.\"  \"I don't get panic attacks but I have a lot of fear around new men.\"  \"I'm getting really tired of being stuck in a wheelchair.\"  Patient reports mood is depressed and frustrated with Yoshi's.  She has multiple complaints of things she believes contribute, including allergies, " "difficulty using CPAP,     She went to sleep clinic last week and was fit with full mask due to allergies, but has not used it yet.  \"They have to help me get it set up.\"  She has some difficulty explaining how she gets this set up.    She states she's sleeping more in the morning, often not up until noon.    Therapist impression of patients current state: This 60-year-old  female with long history of mental health difficulties including anxiety, depression, PTSD, borderline personality disorder, and remote history of hearing voices returns for psychotherapy telephone visit. Patient has had chronic problems with emotional regulation but has done much better over recent years.  No suicidal ideation or thoughts of self-harm.  Occasional complaints of nightmares related to chronic PTSD that will wake her up and create difficulty going back to sleep.  These continue but have some what improved now that she's received COVID-19 vax.  Poor sleep is of concern.  No acute distress noted today. She is open and cooperative in session.  Patent gives verbal consent for writer to call her [place] [facility]  Jose Morfin: [phone], to discuss status around phone issues, video visits, need to get some assistance to get phone visits working before governor's emergency order ends or to transfer to more local  clinic for in-house visits.    Type of psychotherapeutic technique provided: Client centered, Solution-focused, CBT and Mindfulness.    Progress toward short term goals:  Mixed Progress:   She tried to go to [facility] to get some assistance with phone problems in the hopes of doing video visits, but \"they only see people by appointment\".   She has not made appt \"it takes like four weeks to get in\".  She walked around community room and pool table, played pool with  and other residents.    She is talking with neighbors on her floor for social support \"I know most of them now.\"    Review of long term goals: Not " "reviewed today, last reviewed on 5/5/21     Diagnosis:  1. Generalized anxiety disorder    2. Schizoaffective disorder, depressive type (H)    3. Borderline personality disorder (H)      **slightly worsened complaints of not sleeping    Plan and Follow up: Patient will return for follow-up care in 4 weeks. She will walk every other day to community room to play pool with spouse and with neighbors as possible, except on those days when heat is too excessive.  She will invite PCA to play with her \"to change it up\".    Alternative plan to request [facility] CM to locate in person provider more convenient to patient home in [place].    Discharge Criteria/Planning: Client has chronic symptoms and ongoing therapy for maintenance stability recommended.        I have reviewed the note as documented above.  This accurately captures the substance of my conversation with the patient.  As the provider I attest to compliance with applicable laws and regulations related to telemedicine.  Loretta Barney, [facility] Medical CenterSW 6/9/2021  "

## 2021-06-26 NOTE — PROGRESS NOTES
Progress Notes by Breana Puente MD at 2/13/2018 12:29 PM     Author: Breana Puente MD Service: -- Author Type: Physician    Filed: 2/13/2018  2:51 PM Encounter Date: 2/13/2018 Status: Signed    : Breana Puente MD (Physician)    **Sensitive Note**       Plan:  RN to:  1. Call patient and home nurse agency. Inform of med change  Decrease Risperdal 2 mg nightly due to recent fall, and increased QTc (see below).  2.  Obtain records from [facility] ([facility] on file).    F/u appt 3/6/18  Neuropsych test 3/12/18  --------------------------------------  Medications:     Cymbalta 120 mg daily  Trazodone 200 mg daily at bedtime  Seroquel 300mg q hs  Seroquel 50 mg q am  Risperdal 3 mg daily at bedtime  Benadryl 50 mg bid prn anxiety    Cogentin 1 mg/ day  Synthroid 175  g daily  Vit D3 2,000 Units daily     For seizure disorder:   Tegretol 400 mg a.m. 300 mg twice a day  Ethotoin 1000 mg 3 times a day     Coumadin and other meds in EPIC  -----------------------------------------------  Prolonged QTc-   I ordered EKG on 1/9/18 to recheck QTC.    Patient seen in ED 1/11/18 for fall and suspected post ictal episode.  She was discharged home.  EKG results below:  Status:  Final result           Ref Range & Units 1/11/18  4:45 PM  10/5/17  3:25 AM  4/2/17 12:47 PM     SYSTOLIC BLOOD PRESSURE mmHg   116      DIASTOLIC BLOOD PRESSURE mmHg   72      VENTRICULAR RATE BPM 66 86 69    ATRIAL RATE BPM 66 86 69    P-R INTERVAL ms 194 188 190    QRS DURATION ms 88 86 88    Q-T INTERVAL ms 466 406 446    QTC CALCULATION (BEZET) ms 488 485 477    P Axis degrees 62 -2 19    R AXIS degrees 17 37 18    T AXIS degrees 18 46 26    MUSE DIAGNOSIS   Normal sinus rhythm  Prolo... Normal sinus rhythm  Nonsp... Normal sinus rhythm  Charlene...   Normal sinus rhythm   Prolonged QT   Abnormal ECG   When compared with ECG of 05-OCT-2017 03:25,   Nonspecific T wave abnormality has replaced inverted T waves in Anterior  leads   Confirmed by MAJOR MENDENHALL MD LOC:[facility] ([id number]) on 1/11/2018 4:56:14 PM              Labs results (done 1/11/18):  Tegretol level 5.5 (4-12),   BMP sodium 134, K3.7, creatinine 1.28  ------------------------------------------------------------------    Micromedex drug-drug interactions:  Summary: CARBAMAZEPINE -- TRAZODONE HYDROCHLORIDE   Major Good Concurrent use of CARBAMAZEPINE and TRAZODONE may result in increased carbamazepine plasma concentrations or decreased trazodone plasma concentrations and increased risk of serotonin syndrome. DULOXETINE HYDROCHLORIDE -- TRAZODONE HYDROCHLORIDE   Major Fair Concurrent use of DULOXETINE and TRAZODONE may result in increased risk of serotonin syndrome (hypertension, hyperthermia, myoclonus, mental status changes). QUETIAPINE FUMARATE -- CARBAMAZEPINE   Major Fair Concurrent use of CARBAMAZEPINE and QUETIAPINE may result in increased carbamazepine exposure and risk for toxicity and decreased quetiapine efficacy. QUETIAPINE FUMARATE -- TRAZODONE HYDROCHLORIDE   Major Fair Concurrent use of QUETIAPINE and QT INTERVAL PROLONGING AGENTS may result in increased risk of QT-interval prolongation. RISPERIDONE -- QUETIAPINE FUMARATE   Major Fair Concurrent use of QUETIAPINE and QT INTERVAL PROLONGING AGENTS may result in increased risk of QT-interval prolongation. RISPERIDONE -- CARBAMAZEPINE   Moderate Excellent Concurrent use of CARBAMAZEPINE and RISPERIDONE may result in reduced risperiDONE and 9-OH-risperiDONE exposure.

## 2021-06-26 NOTE — PROGRESS NOTES
Progress Notes by Breana Puente MD at 1/2/2018 10:00 AM     Author: Breana Puente MD Service: -- Author Type: Physician    Filed: 2/13/2018 10:27 AM Encounter Date: 1/2/2018 Status: Signed    : Breana Puente MD (Physician)    **Sensitive Note**       .     ECG 12 lead nursing unit performed   Order: [id number]   Status:  Final result   Visible to patient:  No (Not Released) Next appt:  02/15/2018 at 01:00 PM in Behavioral Health (Loretta Barney, [facility])         Ref Range & Units 1/11/18  4:45 PM   10/5/17  3:25 AM   4/2/17 12:47 PM      SYSTOLIC BLOOD PRESSURE mmHg  116     DIASTOLIC BLOOD PRESSURE mmHg  72     VENTRICULAR RATE BPM 66 86 69    ATRIAL RATE BPM 66 86 69    P-R INTERVAL ms 194 188 190    QRS DURATION ms 88 86 88    Q-T INTERVAL ms 466 406 446    QTC CALCULATION (BEZET) ms 488 485 477    P Axis degrees 62 -2 19    R AXIS degrees 17 37 18    T AXIS degrees 18 46 26    MUSE DIAGNOSIS  Normal sinus rhythm  Prolo... Normal sinus rhythm  Nonsp... Normal sinus rhythm  Charlene...   Normal sinus rhythm   Prolonged QT   Abnormal ECG   When compared with ECG of 05-OCT-2017 03:25,   Nonspecific T wave abnormality has replaced inverted T waves in Anterior leads   Confirmed by MAJOR MENDENHALL MD LOC:JN ([id number]) on 1/11/2018 4:56:14 PM      Resulting Agency

## 2021-06-26 NOTE — TELEPHONE ENCOUNTER
RECEIVED HOME HEALTH OCCUPATIONAL THERAPY ORDER FOR REVIEW AND SIGNATURE - PLACED IN PROVIDER'S CLINIC MAILBOX

## 2021-06-27 NOTE — PROGRESS NOTES
Progress Notes by Jany Willoughby CMA at 8/20/2019 10:30 AM     Author: Jany Willoughby CMA Service: -- Author Type: Certified Medical Assistant    Filed: 8/20/2019  3:12 PM Encounter Date: 8/20/2019 Status: Signed    : Jany Willoughby CMA (Certified Medical Assistant)       Substance use:none  : Patty Ellis until August 30 then she will transition to Jose Morfin with [facility] program  Living Situation: Apartment with   Therapist: Loretta Barney  TRAVIS's: none  PCP: Dr. Rooney, seen a month ago  Discus=0    Depression: no  Anxiety: no  How are things going:  Patient states that she would like to talk about her medications.  She thinks that there is a med that she should be taking that she doesn't have.    MN :       Correct pharmacy verified with patient and confirmed in snapshot? [x] yes []no    Charge captured ? [x] yes  [] no    Medications Phoned  to Pharmacy [] yes [x]no  Name of Pharmacist:  List Medications, including dose, quantity and instructions      Medication Prescriptions given to patient   [] yes  [x] no   List the name of the drug the prescription was written for.       Medications ordered this visit were e-scribed.  Verified by order class [x] yes  [] no    Medication changes or discontinuations were communicated to patient's pharmacy: [] yes  [x] no    UA collected [] yes  [x] no    [place] Prescription Monitoring Program Reviewed? [x] yes  [] no    Referrals were made to:  none  Future appointment was made: [x] yes  [] no    Dictation completed at time of chart check: [] yes  [x] no    I have checked the documentation for todays encounters and the above information has been reviewed and completed.     A copy of the new med list for the patient was faxed to [facility].

## 2021-06-27 NOTE — PROGRESS NOTES
Progress Notes by Veronica King LICSW at 3/18/2019 10:13 AM     Author: Veronica King LICSW Service: -- Author Type: Licensed Social Worker    Filed: 3/18/2019 10:15 AM Encounter Date: 3/18/2019 Status: Signed    : Veronica King LICSW (Licensed Social Worker)       Writer received the following  message from the patient via ZapMe:  [facility] group late cancel   Received: Today   Message Contents   Promise Eastman Speciose             Al just called - Valentina broke her foot and won't be at group today.  I asked about next week and he said to leave it for now.

## 2021-06-27 NOTE — PROGRESS NOTES
Progress Notes by Jany Willoughby CMA at 2/26/2019  8:30 AM     Author: Jany Willoughby CMA Service: -- Author Type: Certified Medical Assistant    Filed: 2/26/2019  3:41 PM Encounter Date: 2/26/2019 Status: Signed    : Jany Willoughby CMA (Certified Medical Assistant)       Substance use:None  :Patty Ellis  RUST worker:No  Living Situation:lives in an apartment with her   Therapist:Loretta Barney  TRAVIS's:none today  PCP:Dr. Rooney     Depression: 1/5  Anxiety:1/5  How are things going: Patient would like to discuss medications and sleep.    Discus=0  MN :

## 2021-06-27 NOTE — PROGRESS NOTES
Progress Notes by Jany Willoughby CMA at 5/7/2019 11:30 AM     Author: Jany Willoughby CMA Service: -- Author Type: Certified Medical Assistant    Filed: 5/7/2019  2:34 PM Encounter Date: 5/7/2019 Status: Signed    : Jany Willoughby CMA (Certified Medical Assistant)       Substance use: none  : Patty Caleb  Living Situation: Lives with  in an apartment  Therapist: Loretta Barney  TRAVIS's: person to person, Vikki Goodman Mindy Krueger  PCP: Dr. Rooney, seen 2 months ago    Depression: 1/5 , no S/I  Anxiety:2/5  How are things going: Patient has been unable to sleep.  She states that she will go 4-5 days without sleep.    MN :      Correct pharmacy verified with patient and confirmed in snapshot? [x] yes []no    Charge captured ? [x] yes  [] no    Medications Phoned  to Pharmacy [] yes [x]no  Name of Pharmacist:  List Medications, including dose, quantity and instructions      Medication Prescriptions given to patient   [x] yes  [] no   List the name of the drug the prescription was written for.       Medications ordered this visit were e-scribed.  Verified by order class [x] yes  [] no    Medication changes or discontinuations were communicated to patient's pharmacy: [] yes  [x] no    UA collected [] yes  [x] no    [place] Prescription Monitoring [place] Reviewed? [x] yes  [] no    Referrals were made to: none     Future appointment was made: [x] yes  [] no    Dictation completed at time of chart check: [] yes  [x] no    I have checked the documentation for todays encounters and the above information has been reviewed and completed.

## 2021-06-27 NOTE — PROGRESS NOTES
Progress Notes by Veronica King at 12/17/2018  4:03 PM     Author: Veronica King Service: -- Author Type: Licensed Social Worker    Filed: 12/17/2018  4:04 PM Encounter Date: 12/17/2018 Status: Signed    : Veronica King (Licensed Social Worker)       Writer received the following message via Epic  late cancel   Received: Today   Message Contents   Janet Lazaro Speciose             Per Ayden, lyle and Valentina will not be at group today.  Valentina fell and broke some ribs.

## 2021-06-28 NOTE — PROGRESS NOTES
Progress Notes by Jany Willoughby CMA at 3/31/2020 12:00 PM     Author: Jany Willoughby CMA Service: -- Author Type: Certified Medical Assistant    Filed: 3/31/2020  5:27 PM Encounter Date: 3/31/2020 Status: Signed    : Jany Willoughby CMA (Certified Medical Assistant)       MN  was reviewed prior to talking to the patient today.

## 2021-06-28 NOTE — PROGRESS NOTES
Progress Notes by Jany Willoughby CMA at 1/28/2020 10:00 AM     Author: Jany Willoughby CMA Service: -- Author Type: Certified Medical Assistant    Filed: 1/28/2020  5:07 PM Encounter Date: 1/28/2020 Status: Signed    : Jany Willoughby CMA (Certified Medical Assistant)       Substance use: none  :Jose F CoSelvin  Jose Morfin [phone]  Living Situation: lives in an apartment with   Therapist: Loretta Barney  TRAVIS's: Jose Morfin  PCP: Dr. Rooney, seen last week    Depression: 2/5 No S/I  Anxiety:0/5  How are things going: Patient is having a lot of back pain.  She would like to stay out of the hospital.    MN :

## 2021-06-29 ENCOUNTER — COMMUNICATION - HEALTHEAST (OUTPATIENT)
Dept: ANTICOAGULATION | Facility: CLINIC | Age: 61
End: 2021-06-29

## 2021-06-29 ENCOUNTER — COMMUNICATION - HEALTHEAST (OUTPATIENT)
Dept: BEHAVIORAL HEALTH | Facility: CLINIC | Age: 61
End: 2021-06-29

## 2021-06-29 DIAGNOSIS — Z86.711 HISTORY OF PULMONARY EMBOLISM: ICD-10-CM

## 2021-06-29 LAB
ALBUMIN SERPL-MCNC: 3.6 G/DL (ref 3.5–5)
ALP SERPL-CCNC: 97 U/L (ref 45–120)
ALT SERPL W P-5'-P-CCNC: 27 U/L (ref 0–45)
ANION GAP SERPL CALCULATED.3IONS-SCNC: 14 MMOL/L (ref 5–18)
AST SERPL W P-5'-P-CCNC: 23 U/L (ref 0–40)
BASOPHILS # BLD AUTO: 0.1 THOU/UL (ref 0–0.2)
BASOPHILS NFR BLD AUTO: 1 % (ref 0–2)
BILIRUB SERPL-MCNC: 0.3 MG/DL (ref 0–1)
BUN SERPL-MCNC: 13 MG/DL (ref 8–22)
CALCIUM SERPL-MCNC: 8.8 MG/DL (ref 8.5–10.5)
CHLORIDE BLD-SCNC: 105 MMOL/L (ref 98–107)
CO2 SERPL-SCNC: 21 MMOL/L (ref 22–31)
CREAT SERPL-MCNC: 0.68 MG/DL (ref 0.6–1.1)
EOSINOPHIL # BLD AUTO: 0.1 THOU/UL (ref 0–0.4)
EOSINOPHIL NFR BLD AUTO: 2 % (ref 0–6)
ERYTHROCYTE [DISTWIDTH] IN BLOOD BY AUTOMATED COUNT: 13.5 % (ref 11–14.5)
GFR SERPL CREATININE-BSD FRML MDRD: >60 ML/MIN/1.73M2
GLUCOSE BLD-MCNC: 90 MG/DL (ref 70–125)
HCT VFR BLD AUTO: 40.5 % (ref 35–47)
HGB BLD-MCNC: 13.9 G/DL (ref 12–16)
IMM GRANULOCYTES # BLD: 0 THOU/UL
IMM GRANULOCYTES NFR BLD: 0 %
INR PPP: 3 (ref 0.9–1.1)
LYMPHOCYTES # BLD AUTO: 1.2 THOU/UL (ref 0.8–4.4)
LYMPHOCYTES NFR BLD AUTO: 22 % (ref 20–40)
MCH RBC QN AUTO: 28.7 PG (ref 27–34)
MCHC RBC AUTO-ENTMCNC: 34.3 G/DL (ref 32–36)
MCV RBC AUTO: 84 FL (ref 80–100)
MONOCYTES # BLD AUTO: 0.4 THOU/UL (ref 0–0.9)
MONOCYTES NFR BLD AUTO: 7 % (ref 2–10)
NEUTROPHILS # BLD AUTO: 3.8 THOU/UL (ref 2–7.7)
NEUTROPHILS NFR BLD AUTO: 69 % (ref 50–70)
PLATELET # BLD AUTO: 230 THOU/UL (ref 140–440)
PMV BLD AUTO: 9.8 FL (ref 7–10)
POTASSIUM BLD-SCNC: 3.9 MMOL/L (ref 3.5–5)
PROT SERPL-MCNC: 6.7 G/DL (ref 6–8)
RBC # BLD AUTO: 4.85 MILL/UL (ref 3.8–5.4)
SODIUM SERPL-SCNC: 140 MMOL/L (ref 136–145)
WBC: 5.6 THOU/UL (ref 4–11)

## 2021-06-29 NOTE — PROGRESS NOTES
Progress Notes by Jany Willoughby CMA at 6/30/2020 12:00 PM     Author: Jany Willoughby CMA Service: -- Author Type: Certified Medical Assistant    Filed: 6/30/2020  3:38 PM Encounter Date: 6/30/2020 Status: Signed    : Jany Willoughby CMA (Certified Medical Assistant)       This video/telephone visit will be conducted via a call between you and your physician/provider. We have found that certain health care needs can be provided without the need for an in-person physical exam. This service lets us provide the care you need with a video /telephone conversation. If a prescription is necessary we can send it directly to your pharmacy. If lab work is needed we can place an order for that and you can then stop by our lab to have the test done at a later time.   Just as we bill insurance for in-person visits, we also bill insurance for video/telephone visits. If you have questions about your insurance coverage, we recommend that you speak with your insurance company.   Patient has given verbal consent for a Telephone visit? yes  Patient would like the phone call to [phone]  Patient verified allergies, medications and pharmacy via phone. Patient is requesting hydroxyzine for helping her get to sleep.  EDWARD Fontaine  was reviewed prior to seeing patient today. See below for embedded report.

## 2021-06-29 NOTE — PROGRESS NOTES
Progress Notes by Griselda Garcia PT at 10/22/2020 12:30 PM     Author: Griselda Garcia PT Service: -- Author Type: Physical Therapist    Filed: 10/28/2020  1:50 PM Encounter Date: 10/22/2020 Status: Attested Addendum    : Griselda Garcia PT (Physical Therapist)    Related Notes: Original Note by Griselda Garcia PT (Physical Therapist) filed at 10/22/2020  4:49 PM    Cosigner: Reema Wallis CNP at 10/28/2020  2:50 PM    Attestation signed by Reema Wallis CNP at 10/28/2020  2:50 PM    Follow therapist recommendations please                    [facility] Certification Request    October 22, 2020      Patient: Valentina Olmedo  MR Number: [id number]  YOB: 1960  Date of Visit: 10/22/2020      Dear Reema Wallis CNP:    Thank you for this referral.   We are seeing Valentina Olmedo for Physical Therapy of low back pain.    Medicare and/or Medicaid requires physician review and approval of the treatment plan. Please review the plan of care and verify that you agree with the therapy plan of care by co-signing this note.      Plan of Care  Authorization / Certification Start Date: 10/22/20  Authorization / Certification End Date: 01/20/21  Authorization / Certification Number of Visits: 12  Communication with: Referral Source  Patient Related Instruction: Nature of Condition;Treatment plan and rationale;Self Care instruction;Basis of treatment;Body mechanics;Posture;Precautions;Next steps;Expected outcome  Times per Week: 1x/every 1-2 weeks  Number of Weeks: 12  Number of Visits: 12  Discharge Planning: when indicated  Precautions / Restrictions : none  Therapeutic Exercise: ROM;Stretching;Strengthening  Neuromuscular Reeducation: posture;TNE;core;other  Neuromuscular Re-education: neurodynamics  Manual Therapy: soft tissue mobilization;joint mobilization;muscle energy  Functional Training (ADL's): self care;ADL's        Goals:  Pt. will demonstrate/verbalize independence in  self-management of condition in : 12 weeks;Comment  Comment:: educated on strengthening program for core/trunk and LEs  Pt. will be able to walk : 10 minutes;around the house;on even surfaces;with less pain;with less difficulty;for household mobility;for community mobility;for exercise/recreation;in 12 weeks;Comment  Comment:: around her apartment building with walker without falling  Patient will stand : 10 minutes;with less pain;with less difficultty;for home chores;for other activities;in 12 weeks    Patient will decrease : by _ points;for improved quality of function;for improved quality of life;in 12 weeks  by ___ points: 6        If you have any questions or concerns, please don't hesitate to call.    Sincerely,      Griselda Garcia, PT, DPT, OCS, CLT        Physician recommendation:     ___ Follow therapist's recommendation        ___ Modify therapy      *Physician co-signature indicates they certify the need for these services furnished within this plan and while under their care.      [facility]   Initial Evaluation    Patient Name: Valentina Olmedo  Date of evaluation: 10/22/2020  Visit number: 1/12  Referring Provider: Donald Rooney MD  Referring Diagnosis:  Chronic bilateral low back pain with right-sided sciatica [M54.41, G89.29]  - Primary       History of vertebral compression fracture [Z87.81]       Age-related osteoporosis with current pathological fracture with delayed healing, subsequent encounter [M80.00XG]       Vitamin D deficiency [E55.9]       Numbness and tingling of left lower extremity [R20.0, R20.2]       Closed compression fracture of L5 lumbar vertebra with delayed healing, subsequent encounter [S32.050G]       Closed compression fracture of L2 lumbar vertebra with delayed healing, subsequent encounter [S32.020G]         Visit Diagnosis:     ICD-10-CM    1. Chronic bilateral low back pain with right-sided sciatica  M54.41     G89.29    2. History of vertebral  compression fracture  Z87.81    3. Age-related osteoporosis with current pathological fracture with delayed healing, subsequent encounter  M80.00XG    4. Generalized muscle weakness  M62.81    5. Physical deconditioning  R53.81    6. Abnormality of gait  R26.9    7. Difficulty balancing  R29.818        Assessment:        Valentina Olmedo is a 60 y.o. female who presents to therapy today with chief complaints of low back pain and R hip pain with frequent falls and decreased ability to walk. Onset date of sx was last fall when pt fell down her steps with unknown case.  Pt reported h/o frequent falls last year including falls outside and an accident that resulted in multiple lumbar and rib fractures.  Pain symptoms are mild to severe and have caused pt to use a wheelchair when she leaves her home.  PMHx significant for recent diagnosis of osteoporosis and she just had her first shot of Prolia last week, thyroid dysfunction with resection, R lung lobule clot with COPD on warfarin, h/o kidney stones, current smoker and h/o seizures. Functional impairments include difficulty and pain with standing, walking, bending, lifting, balance and falls.  Pt demo's signs and sx consistent with chronic low back pain with deconditioning - especially proximal hip and core/trunk strength, decreased gait mechanics and endurance with gait with W/W, and increased risk of falls.     Pt. is appropriate for skilled PT intervention as outlined in the Plan of Care (POC).  Pt. is a good candidate for skilled PT services to improve pain levels and function.    Goals:  Pt. will demonstrate/verbalize independence in self-management of condition in : 12 weeks;Comment  Comment:: educated on strengthening program for core/trunk and LEs  Pt. will be able to walk : 10 minutes;around the house;on even surfaces;with less pain;with less difficulty;for household mobility;for community mobility;for exercise/recreation;in 12 weeks;Comment  Comment:: around her  apartment building with walker without falling  Patient will stand : 10 minutes;with less pain;with less difficultty;for home chores;for other activities;in 12 weeks    Patient will decrease : by _ points;for improved quality of function;for improved quality of life;in 12 weeks  by ___ points: 6      Patient's expectations/goals are realistic.    Barriers to Learning or Achieving Goals:  Chronicity of the problem.       Plan / Patient Instructions:      Plan for next visit: Assess response to sit to stand, walking x1 minute in apartment and scooting in w/c forward and retro.    Attempt 2 minute walk test if able and assess lumbar ROM and hip abd/ext strength.  Progress HEP as able.    Plan of Care:   Authorization / Certification Start Date: 10/22/20  Authorization / Certification End Date: 01/20/21  Authorization / Certification Number of Visits: 12  Communication with: Referral Source  Patient Related Instruction: Nature of Condition;Treatment plan and rationale;Self Care instruction;Basis of treatment;Body mechanics;Posture;Precautions;Next steps;Expected outcome  Times per Week: 1x/every 1-2 weeks  Number of Weeks: 12  Number of Visits: 12  Discharge Planning: when indicated  Precautions / Restrictions : none  Therapeutic Exercise: ROM;Stretching;Strengthening  Neuromuscular Reeducation: posture;TNE;core;other  Neuromuscular Re-education: neurodynamics  Manual Therapy: soft tissue mobilization;joint mobilization;muscle energy  Functional Training (ADL's): self care;ADL's    Treatment techniques, plan of care, and goals were discussed with the patient. The patient agrees to the plan as outlined.  The plan of care is dynamic and will be modified on an ongoing basis.       Subjective:       Social information:   Occupation:not working for about the past 3 years   Work Status:NA and On permanent disability    History of Present Illness:  Pt reports last winter she fell in a big snow bank. Since then she found out  she has osteoporosis. She broke a couple of bones in her spine.  Pt was also falling a lot at home and outside either from snow or ice or legs giving out.  Pt then was in an accident Feb 2020 and broke a couple more vertebrae and some ribs.   Pt reports MDs say that her healing is getting better.  Pt reports she was walking just fine prior to the falls but now she uses a wheelchair when she goes out of the house. Pt reports the W/C helps with both pain and because she has been falling.  Pt reports when she stands and walks she gets increased sharp pain in R hip and can go down into back of R leg. Pt always has back pain but it can also increase when she stands or walks.  Pt reports she has a very hard time falling asleep and sometimes only gets 3-6 hours of sleep per night - not necessarily related to pain. Pain usually feels better when she lays down.  Pt reports she has had vertigo before but that doesn't seem to cause her falls.  Xrays from June showed stable compression deformities L1 and L5 and L2.  Pt reports prior to fall in snowbank she was falling inside the house - fell down her stairs last fall.  Pt reports she is newly diagnosed with osteoporosis this past year. Prior to that pt was taking Vitamin D for osteopenia.  PMHx significant for thyroid resection, COPD - pt has a massive clot in R lobule so she is on warfain, h/o seizures with last one 2002, is trying to quit smoking, h/o kidney stones removed.  Pt just had her first shot of prolia earlier this month - will get another in April.  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      Pain Rating:3  Pain rating at best: 2  Pain rating at worst: 10  Pain description: strong but not sharp, R hip pain gets more sharp    Patient reports benefit from:  can feel better laying in bed       Objective:      Patient Outcome Measures :    Modified Oswestry Low Back Pain Disablity Questionnaire  in %:  32     Scores range from 0-100%, where a score of 0% represents minimal pain and maximal function. The minimal clinically important difference is a score reduction of 12%.    Precautions/Restrictions: osteoporosis  Involved side: Bilateral  Posture Observation:      General sitting posture is  fair.  Gait: Pt amb with WW with increased B trendelenberg, sway back and short step length with mild shuffling gait x10 feet. Pt attempted to walk without W/W - had increased trendelenberg and decreased step length as well as decreased speed.    ROM: Cervical and shoulder ROM WNL with minimal pain, B knee extension WNL    Strength: B UE grossly 5/5 except L  weak compared to R   B LE quads and DF 5/5, B hip flexors 3/5 with low back pain, used UE to transfer sit to stand, lateral/posterior hip weak with observed trendelenberg    Sensation: Intact to light touch B LEs    Special tests :  Balance APTA 30 seconds sit to stand 8 reps (age gender norms  at 15)      Treatment Today      TREATMENT MINUTES COMMENTS   Evaluation 30 Back / deconditioning   Self-care/ Home management     Manual therapy     Neuromuscular Re-education     Therapeutic Activity     Therapeutic Exercises 24 Discussed difficulties and challenges for living in apartment for options with physical activity levels and aerobic exercise. Educated pt on strengthening as able for B LEs and core/trunk. Initiated and performed HEP per pt instructions and printed AV.S for home.    Exercise #1: Sit to stand x5-10 reps 1-2x/day  Comment #1: Walking x1 minute 2-3x/day  Exercise #2: Scooting forward and backward in W/C down hallway at apartment complex       Gait training     Modality__________________                Total 54    Blank areas are intentional and mean the treatment did not include these items.        PT Evaluation Code: (Please list factors)  Patient History/Comorbidities: see subj  Examination: trunk B LEs  Clinical Presentation: evolving  Clinical  Decision Making: mod    Patient History/  Comorbidities Examination  (body structures and functions, activity limitations, and/or participation restrictions) Clinical Presentation Clinical Decision Making (Complexity)   No documented Comorbidities or personal factors 1-2 Elements Stable and/or uncomplicated Low   1-2 documented comorbidities or personal factor 3 Elements Evolving clinical presentation with changing characteristics Moderate   3-4 documented comorbidities or personal factors 4 or more Unstable and unpredictable High       Griselda Garcia PT, DPT, OCS, CLT  10/22/2020  12:52 PM

## 2021-06-30 NOTE — PROGRESS NOTES
Progress Notes by Melly Leggett CMA at 4/6/2021 11:00 AM     Author: Melly Leggett CMA Service: -- Author Type: Certified Medical Assistant    Filed: 4/6/2021  4:57 PM Encounter Date: 4/6/2021 Status: Signed    : Melly Leggett CMA (Certified Medical Assistant)       This video/telephone visit will be conducted via a call between you and your physician/provider. We have found that certain health care needs can be provided without the need for an in-person physical exam. This service lets us provide the care you need with a video /telephone conversation. If a prescription is necessary we can send it directly to your pharmacy. If lab work is needed we can place an order for that and you can then stop by our lab to have the test done at a later time.   Just as we bill insurance for in-person visits, we also bill insurance for video/telephone visits. If you have questions about your insurance coverage, we recommend that you speak with your insurance company.   Patient has given verbal consent for video/Telephone visit? Yes   Patient would like telephone visit please call: [phone]  EDWARD/RIDGE LOVELACE CMA     Patient verified allergies, medications and pharmacy via phone.  Patient states she is ready for visit.  [place]  was reviewed prior to seeing patient today. See below for embedded report.

## 2021-06-30 NOTE — PROGRESS NOTES
Progress Notes by Griselda Garcia PT at 4/1/2021  3:30 PM     Author: Griselda Garcia PT Service: -- Author Type: Physical Therapist    Filed: 4/5/2021  2:28 PM Encounter Date: 4/1/2021 Status: Attested Addendum    : Griselda Garcia PT (Physical Therapist)    Related Notes: Original Note by Griselda Garcia PT (Physical Therapist) filed at 4/1/2021  5:49 PM    Cosigner: Promise Vanegas MD at 4/6/2021  7:23 AM    Attestation signed by Promise Vanegas MD at 4/6/2021  7:23 AM    Agree with plan of care.                    [facility] Certification Request    April 5, 2021      Patient: Valentina Olmedo  MR Number: [id number]  YOB: 1960  Date of Visit: 4/1/2021      Dear Dr. Vanegas:    Thank you for this referral.   We are seeing Valentina Olmedo for Physical Therapy of shoulder, hands and back pain.    Medicare and/or Medicaid requires physician review and approval of the treatment plan. Please review the plan of care and verify that you agree with the therapy plan of care by co-signing this note.      Plan of Care  Authorization / Certification Start Date: 04/01/21  Authorization / Certification End Date: 07/01/21  Authorization / Certification Number of Visits: 12  Pt. will demonstrate/verbalize independence in self-management of condition in : 12 weeks;Comment  Comment:: educated on strengthening program for core/trunk and LEs.    Pt. will be able to walk : 10 minutes;around the house;on even surfaces;with less pain;with less difficulty;for household mobility;for community mobility;for exercise/recreation;in 12 weeks;Comment  Comment:: around her apartment building with walker without falling.    Patient will stand : 10 minutes;with less pain;with less difficulty;for home chores;for other activities;in 12 weeks.    Patient will decrease : by _ points;for improved quality of function;for improved quality of life;in 12 weeks  by ___ points: 6       Goals:  Goal Status:   Pt. will  demonstrate/verbalize independence in self-management of condition in : 12 weeks;Comment  Comment:: educated on strengthening program for core/trunk and LEs.    Pt. will be able to walk : 10 minutes;around the house;on even surfaces;with less pain;with less difficulty;for household mobility;for community mobility;for exercise/recreation;in 12 weeks;Comment  Comment:: around her apartment building with walker without falling.    Patient will stand : 10 minutes;with less pain;with less difficulty;for home chores;for other activities;in 12 weeks.    Patient will decrease : by _ points;for improved quality of function;for improved quality of life;in 12 weeks  by ___ points: 6       If you have any questions or concerns, please don't hesitate to call.    Sincerely,      Griselda Garcia, PT, DPT, OCS, CLT        Physician recommendation:     ___ Follow therapist's recommendation        ___ Modify therapy      *Physician co-signature indicates they certify the need for these services furnished within this plan and while under their care.      [facility] INITIAL EVALUATION    Patient Name: Valentina Olmedo  Date: 4/1/21  Visit #: 1/12  Referring Provider: Promise Vanegas MD  Referring Diagnosis:   Pain in both hands [M79.641, M79.642]  - Primary       Chronic right shoulder pain [M25.511, G89.29]       Other osteoarthritis of spine, cervical region [M47.892]       Spondylosis of lumbosacral region, unspecified spinal osteoarthritis complication status [M47.817]           Visit Diagnosis:     ICD-10-CM    1. Chronic bilateral low back pain with right-sided sciatica  M54.41     G89.29    2. History of vertebral compression fracture  Z87.81    3. Age-related osteoporosis with current pathological fracture with delayed healing, subsequent encounter  M80.00XG    4. Generalized muscle weakness  M62.81    5. Physical deconditioning  R53.81    6. Abnormality of gait  R26.9    7. Difficulty balancing  R29.818        Assessment:      Pt is a 60 yo female who presents to therapy today with chief complaints of neck, shoulder, low back and knee and foot pain with frequent falls with dizziness and decreased ability to walk. Onset date of sx was Fall 2019 when pt fell down her steps with unknown case. Pt reports h/o frequent falls ever since and had to stop coming to physical therapy this winter due to significant increase in her dizziness and falls. Pt reports her medical team has figured out that she is probably having increased dizziness from her seizure med and they will be weaning her off of that and started her on a new one soon. Since pt had frequent falls she has had multiple rib and lumbar fractures and pt has been having to use a wheelchair due to frequentness of falls and risk of fractures. Pt was diagnosed with osteoporosis, thyroid dysfunction, COPB, h/o kidney stones, current smoke and h/o seizures.  Functional impairments include difficulty and standing, walking, bending, lifting, balance and falls.  Pt demo's signs and sx consistent with chronic neck, shoulder, low back and knee pain with deconditioning - with decreased ROM and strength - especially proximal hip and core/trunk strength, decreased gait mechanics and endurance with gait with W/W, and increased risk of falls.    Pt. is appropriate for skilled PT intervention as outlined in the Plan of Care (POC).  Pt. is a good candidate for skilled PT services to improve pain levels and function.    Goal Status:   Pt. will demonstrate/verbalize independence in self-management of condition in : 12 weeks;Comment  Comment:: educated on strengthening program for core/trunk and LEs.    Pt. will be able to walk : 10 minutes;around the house;on even surfaces;with less pain;with less difficulty;for household mobility;for community mobility;for exercise/recreation;in 12 weeks;Comment  Comment:: around her apartment building with walker without falling.    Patient will stand : 10 minutes;with  less pain;with less difficulty;for home chores;for other activities;in 12 weeks.    Patient will decrease : by _ points;for improved quality of function;for improved quality of life;in 12 weeks  by ___ points: 6        Patient's expectations/goals are realistic.     Barriers to Learning or Achieving Goals:  Chronicity of the problem.    Plan / Patient Education:     Continue with initial plan of care.   Assess response to HEP in 3 weeks - addition of heel raises and LAQ.  Attempt progressing spinal and B LE strength as able.    Exercises:  Exercise #1: Seated marching, LAQ and ankle pumps x10-20 reps 1-2x/day  Comment #1: Neck ROM flex, rotation and side bend x10 reps 1-2x/day  Exercise #2: B shoulder flexion raises x10 reps 1-2x/day  Comment #2: Walking in hallway with W/C follow with  1-2x/day  Exercise #3: Sit to stand with W/C behind pt x5-10 reps 1-2x/day  Comment #3: Added seated hip add iso into pillow x2-3 seconds x5-10 reps 1-2x/day  Exercise #4: Seated marching x5-10 reps 1-2x/day  Comment #4: Neck flex, SB and rotation ROM x5-10 reps 1-2x/day  Exercise #5: SLS B x5-10 seconds x5-10 reps each 1-2x/day  Comment #5: ADDED heel raises x10-20 reps and seated LAQ x10 reps each 1-2x/day          Subjective:     Pt returns to PT after 2 months off of PT due to medical issues.    Pt reports she is taking Lamotragin - a medication for epilepsy (last seizure was in 2001) and she has been getting increasing dizziness. Pt wasn't able to come in to PT due to how dizzy she was. Dizziness is getting better but can still bother her. She will meet with MD 4/13 to start changing her medication.     Pt reports when the dizziness got really bed she fell 5x in 1 week and one of the times she went to the emergency room and was released home.    Pt reports whole back, neck, R arm and both hands, R knee have all been sore. Pt also can get soreness and numbness in B feet. Hands 9/10, back 6-7/10 and rest of body around  "4/10.  Pt can still do neck exercises, only able to do laying down exercises in bed. Pt reports walking in apartment only - she is afraid of falling due to dizziness.    Pt was also just put on C-PAP and she is still getting used to it.  Pt reports she did a sleep study and she quit breathing \"81 times\"    Pt reports she has continued to smoke 3-4 cigarettes per day. Pt has been riding in her W/C to smoke breaks - she had been walking before the dizziness got bad.    Pt will be getting her Prolia shot soon - gets it 1x/every 6 months.      Patient Outcome Measures:  Modified Oswestry Low Back Pain Disablity Questionnaire  in %: 68     Scores range from 0-100%, where a score of 0% represents minimal pain and maximal function. The minimal clinically important difference is a score reduction of 12%.     Objective:     Cervical ROM WNL all directions except L rotation min limited with stiffness and mild pain    R shoulder ROM WNL with minimal pain  R shoulder flexors 4/5 with mild soreness, biceps/triceps 5/5 without pain and good  but increased pain with gripping    B hip flexors 3+/5 with pain R knee, B quad and DF 5/5 but painful R knee     Seated Trunk flex min limited with soft tissue approximation, ext WNL and feels good to perform, B SB WNL but did land back in her chair from losing her balance when she did R SB  Sit to stand 8 reps in 30 seconds today (was 2-9 reps last episode of care)    Oxygen sats today 4/1 at 96% and heart rate after strength testing and sit to stand exercise was at 78-90 bpm      FROM LAST EPISODE OF CARE  Two Minute Walk Test  Total meters walked: 360 ft (was 222-250 ft last tested)  AD used? pushed w/c through test  RPE: not any shortness of breath at end of walk today (was hard with breating last time), pain in back 4-5/10 by end of walk (last time 6-7/10)  Pt demo'ed great stride length and decreased trendelenberg today (was shuffling R > L last visit)  Age gender norm: 578 " ft  TWO- MINUTE WALK TEST (2mwt) MEANS   MEAN DISTANCE (FEET) METERS       + cm   18-54 600.4 183 m 0.192  cm   55-59 578.7 176 m 38.776 cm   60-64 545.9 166 m 32.936 cm   65-69 509.2 155 m 20.416 cm   70-74 478.7 145 m 95.9576 cm   75-79 462.3 140 m 90.904 cm   80-85 440.6 136 m 29.488 cm   R.W Marielos et al (2014) Conversion Cheryl Bernard PT, CLT-RAMSEY        Treatment Today      TREATMENT MINUTES COMMENTS   Evaluation 24 Whole body   Self-care/ Home management     Manual therapy     Neuromuscular Re-education     Therapeutic Activity     Therapeutic Exercises 40 Reviewed old HEP and discussed importance of performing exercise for movement and blood flow to improve pain sx with arthritis and deconditioning - but with safety due to recent increase in falls and dizziness. Recommended pt only to practice walking in hallways at home with  following with wheelchair and pt using WW. Recommended pt to perform seated ROM and strengthening exercises and attempt sit to stand with W/C behind in case she gets a spell of dizziness so she is safe with practicing HEP at home. Initiated this new HEP per pt instructions and flow sheet and printed AVS for home.   Gait training     Modality__________________                Total 64    Blank areas are intentional and mean the treatment did not include these items.       Griselda Garcia PT, DPT, OCS, CLT  4/1/21  2:24 PM

## 2021-06-30 NOTE — PROGRESS NOTES
Progress Notes by Melly Leggett CMA at 2/2/2021 11:00 AM     Author: Melly Leggett CMA Service: -- Author Type: Certified Medical Assistant    Filed: 2/2/2021  5:24 PM Encounter Date: 2/2/2021 Status: Signed    : Melly Leggett CMA (Certified Medical Assistant)         This video/telephone visit will be conducted via a call between you and your physician/provider. We have found that certain health care needs can be provided without the need for an in-person physical exam. This service lets us provide the care you need with a video /telephone conversation. If a prescription is necessary we can send it directly to your pharmacy. If lab work is needed we can place an order for that and you can then stop by our lab to have the test done at a later time.   Just as we bill insurance for in-person visits, we also bill insurance for video/telephone visits. If you have questions about your insurance coverage, we recommend that you speak with your insurance company.   Patient has given verbal consent for video/Telephone visit? Yes   Patient would like telephone visit please call: [phone]  EDWARD/RIDGE LOVELACE CMA   Pt was switched from medication Peganone 250 mg three times a day  to Lamictal 25 mg daily.    Patient verified allergies, medications and pharmacy via phone.  Patient states she  is ready for visit.  [place]  was reviewed prior to seeing patient today. See below for embedded report.

## 2021-07-02 ENCOUNTER — COMMUNICATION - HEALTHEAST (OUTPATIENT)
Dept: ADMINISTRATIVE | Facility: CLINIC | Age: 61
End: 2021-07-02

## 2021-07-02 ENCOUNTER — COMMUNICATION - HEALTHEAST (OUTPATIENT)
Dept: FAMILY MEDICINE | Facility: CLINIC | Age: 61
End: 2021-07-02

## 2021-07-02 ENCOUNTER — AMBULATORY - HEALTHEAST (OUTPATIENT)
Dept: LAB | Facility: CLINIC | Age: 61
End: 2021-07-02

## 2021-07-02 ENCOUNTER — AMBULATORY - HEALTHEAST (OUTPATIENT)
Dept: FAMILY MEDICINE | Facility: CLINIC | Age: 61
End: 2021-07-02

## 2021-07-02 DIAGNOSIS — Z86.711 HISTORY OF PULMONARY EMBOLISM: ICD-10-CM

## 2021-07-02 DIAGNOSIS — E21.0 PRIMARY HYPERPARATHYROIDISM (H): ICD-10-CM

## 2021-07-02 DIAGNOSIS — Z79.899 HISTORY OF LONG-TERM TREATMENT WITH HIGH-RISK MEDICATION: ICD-10-CM

## 2021-07-02 DIAGNOSIS — E03.9 ACQUIRED HYPOTHYROIDISM: ICD-10-CM

## 2021-07-02 DIAGNOSIS — G40.209 LOCALIZATION-RELATED FOCAL EPILEPSY WITH COMPLEX PARTIAL SEIZURES (H): ICD-10-CM

## 2021-07-02 LAB
CARBAMAZEPINE EP SERPL-MCNC: 3.3 UG/ML (ref 0.4–4)
SPECIMEN STATUS: NORMAL

## 2021-07-03 NOTE — ADDENDUM NOTE
Addendum Note by Breana Ruiz MD at 3/6/2018 11:42 AM     Author: Breana Ruiz MD Service: -- Author Type: Physician    Filed: 3/6/2018 11:42 AM Encounter Date: 1/2/2018 Status: Signed    : Breana Ruiz MD (Physician)    Addended by: BREANA RUIZ on: 3/6/2018 11:42 AM        Modules accepted: Orders

## 2021-07-03 NOTE — ADDENDUM NOTE
Addendum Note by Breana Ruiz MD at 9/19/2019 12:30 PM     Author: Breana Ruiz MD Service: -- Author Type: Physician    Filed: 9/24/2019  9:17 AM Encounter Date: 9/19/2019 Status: Signed    : Breana Ruiz MD (Physician)    Addended by: BREANA RUIZ on: 9/24/2019 09:17 AM        Modules accepted: Orders

## 2021-07-03 NOTE — ADDENDUM NOTE
Addendum Note by Jerica Armenta RN at 10/13/2020  3:16 PM     Author: Jerica Armenta RN Service: -- Author Type: Registered Nurse    Filed: 10/13/2020  3:16 PM Encounter Date: 10/13/2020 Status: Signed    : Jerica Armenta RN (Registered Nurse)    Addended by: JERICA ARMENTA on: 10/13/2020 03:16 PM        Modules accepted: Orders

## 2021-07-03 NOTE — ADDENDUM NOTE
Addendum Note by Breana Ruiz MD at 12/6/2018 12:30 PM     Author: Breana Ruiz MD Service: -- Author Type: Physician    Filed: 12/18/2018 12:14 PM Encounter Date: 12/6/2018 Status: Signed    : Breana Ruiz MD (Physician)    Addended by: BREANA RUIZ on: 12/18/2018 12:14 PM        Modules accepted: Orders

## 2021-07-03 NOTE — ADDENDUM NOTE
Addendum Note by Reema Wallis CNP at 7/23/2020 11:40 AM     Author: Reema Wallis CNP Service: -- Author Type: Nurse Practitioner    Filed: 7/23/2020 12:07 PM Date of Service: 7/23/2020 11:40 AM Status: Signed    : Reema Wallis CNP (Nurse Practitioner)    Encounter addended by: Reema Wallis CNP on: 7/23/2020 12:07 PM      Actions taken: Level of Service modified, Follow-up modified

## 2021-07-03 NOTE — ADDENDUM NOTE
Addendum Note by Dustin Pham MD at 11/6/2019 11:20 AM     Author: Dustin Pham MD Service: -- Author Type: Physician    Filed: 11/8/2019 10:05 AM Encounter Date: 11/6/2019 Status: Signed    : Dustin Pham MD (Physician)    Addended by: DUSTIN PHAM on: 11/8/2019 10:05 AM        Modules accepted: Orders

## 2021-07-03 NOTE — ADDENDUM NOTE
Addendum Note by Dustin Pham MD at 11/6/2019 11:20 AM     Author: Dustin Pham MD Service: -- Author Type: Physician    Filed: 11/6/2019  2:41 PM Encounter Date: 11/6/2019 Status: Signed    : Dustin Pham MD (Physician)    Addended by: DUSTIN PHAM on: 11/6/2019 02:41 PM        Modules accepted: Orders

## 2021-07-04 NOTE — TELEPHONE ENCOUNTER
Telephone Encounter by Gustavo Gamez RN at 6/15/2021  5:40 PM     Author: Gustavo Gamez RN Service: -- Author Type: Registered Nurse    Filed: 6/15/2021  5:49 PM Encounter Date: 6/15/2021 Status: Signed    : Gustavo Gamez RN (Registered Nurse)       ANTICOAGULATION  MANAGEMENT    Assessment     Today's INR result of 1.4 is Subtherapeutic (goal INR of 2.0-3.0)        Warfarin taken as previously instructed    No new diet changes affecting INR    No new medication/supplements affecting INR     Continues to tolerate warfarin with no reported s/s of bleeding or thromboembolism     Previous INR was Subtherapeutic    Plan:     Spoke on phone with Valentina  And left VM for [place] regarding INR result and instructed:       Warfarin Dosing Instructions:  Change warfarin dose to 15 mg daily on tue/fri; and 10 mg daily rest of week  (14.3 % change)    Instructed patient to follow up no later than: one week       Valentina verbalizes understanding and agrees to warfarin dosing plan.    Instructed to call the [facility] for any changes, questions or concerns. (#[phone])   ?   Gustavo Gamez RN    Subjective/Objective:      Valentina Olmedo, a 61 y.o. female is on warfarin. Valentina Montgomery reports:     Home warfarin dose: verbally confirmed home dose with Valentina and updated on anticoagulation calendar     Missed doses: No     Medication changes:  No     S/S of bleeding or thromboembolism:  No     New Injury or illness:  No     Changes in diet or alcohol consumption:  No     Upcoming surgery, procedure or cardioversion:  No    Anticoagulation Episode Summary     Current INR goal:  2.0-3.0   TTR:  42.7 % (1 y)   Next INR check:  6/22/2021   INR from last check:  1.40 (6/15/2021)   Weekly max warfarin dose:     Target end date:     INR check location:     Preferred lab:     Send INR reminders to:  [facility]    Indications    History of pulmonary embolism [Z86.711]           Comments:           Anticoagulation Care  Providers     Provider Role Specialty Phone number    Donald Rooney MD Referring Internal Medicine [phone]    Promise Vanegas MD Responsible Family Medicine [phone]

## 2021-07-04 NOTE — TELEPHONE ENCOUNTER
Telephone Encounter by Orin Villanueva CMA at 7/2/2021  1:41 PM     Author: Orin Villanueva CMA Service: -- Author Type: Certified Medical Assistant    Filed: 7/2/2021  1:42 PM Encounter Date: 7/2/2021 Status: Signed    : Orin Villanueva CMA (Certified Medical Assistant)       Called pharmacy Last fills 07/01, 06/08, 05/11, 04/13/21.

## 2021-07-04 NOTE — TELEPHONE ENCOUNTER
Telephone Encounter by Virginie Clarke CMA at 7/2/2021  9:23 AM     Author: Virginie Clarke CMA Service: -- Author Type: Medical Assistant    Filed: 7/2/2021  9:23 AM Encounter Date: 7/2/2021 Status: Signed    : Virginie Clarke CMA (Medical Assistant)       Voice mail left for sunshine to proceed with orders        no

## 2021-07-04 NOTE — TELEPHONE ENCOUNTER
Telephone Encounter by Gustavo Gamez RN at 6/29/2021  3:53 PM     Author: Gustavo Gamez RN Service: -- Author Type: Registered Nurse    Filed: 6/29/2021  4:35 PM Encounter Date: 6/29/2021 Status: Signed    : Gustavo Gamez RN (Registered Nurse)       ANTICOAGULATION MANAGEMENT     Valentina Olmedo 61 y.o., female is on warfarin with Therapeutic INR result (goal range 2.0-3.0)    Recent labs: (last 7 days)     06/29/21  1328   INR 3.00*       ASSESSMENT     Source: Chart Review, Patient/Caregiver Call and Template      Warfarin dosing taken: Warfarin taken as instructed    Diet: No new diet changes affecting INR    Illness, Injury or hospitalization: No    Medication changes: None    Signs or symptoms of bleeding or clotting: No    Previous INR: subtherapeutic    Additional findings: None     PLAN     Recommended plan for no diet, medication or health factor changes affecting INR:     Dosing instructions: Continue your current warfarin dose 10 mg daily on mon/wed/fri; and 15 mg daily rest of week (0% change)    Follow up no later than: 1 week     Telephone call with Valentina who verbalizes understanding and agrees to plan, left message for Sarah BOO    Lab visit scheduled       Plan made per [facility] anticoagulation protocol    Gustavo Gamez  Anticoagulation Clinic   [phone]    Anticoagulation Episode Summary     Current INR goal:  2.0-3.0   TTR:  44.0 % (1 y)   Next INR check:  7/6/2021   INR from last check:  3.00 (6/29/2021)   Weekly max warfarin dose:     Target end date:     INR check location:     Preferred lab:     Send INR reminders to:  [facility]    Indications    History of pulmonary embolism [Z86.711]           Comments:           Anticoagulation Care Providers     Provider Role Specialty Phone number    Donald Rooney MD Referring Internal Medicine [phone]    Promise Vanegas MD Responsible Family Medicine [phone]

## 2021-07-04 NOTE — TELEPHONE ENCOUNTER
Telephone Encounter by Lorraine Villegas LPN at 6/29/2021  3:38 PM     Author: Lorraine Villegas LPN Service: -- Author Type: Licensed Nurse    Filed: 6/29/2021  3:39 PM Encounter Date: 6/29/2021 Status: Signed    : Lorraine Villegas LPN (Licensed Nurse)       Faxed to [facility]

## 2021-07-04 NOTE — TELEPHONE ENCOUNTER
Telephone Encounter by Orin Villanueva CMA at 7/2/2021  1:41 PM     Author: Orin Villanueva CMA Service: -- Author Type: Certified Medical Assistant    Filed: 7/2/2021  1:41 PM Encounter Date: 7/2/2021 Status: Signed    : Orin Villanueva CMA (Certified Medical Assistant)       ----- Message from Promise Vanegas MD sent at 7/2/2021  7:03 AM CDT -----  Please call patient's pharmacy and get the dates for the last 3 fills of her levothyroxine. Thanks.

## 2021-07-04 NOTE — TELEPHONE ENCOUNTER
Telephone Encounter by Iman Oliver at 7/2/2021  8:12 AM     Author: Iman Oliver Service: -- Author Type: Patient Access    Filed: 7/2/2021  8:14 AM Encounter Date: 7/2/2021 Status: Signed    : Iman Oliver (Patient Access)       Reason for Call: Request for an order or referral:    Order or referral being requested: Need verbal orders for skilled nursing for home care.     Skilled nursing for 1 x 8 weeks for med management, mental illness management.     Verbal order called back to Sarah @ [phone]    Date needed: as soon as possible    Has the patient been seen by the PCP for this problem? YES    Additional comments:     Phone number Patient can be reached at:  Other phone number:  [employer] @ [phone]    Best Time:      Can we leave a detailed message on this number?  Yes    Call taken on 7/2/2021 at 8:12 AM by Iman Oliver

## 2021-07-04 NOTE — TELEPHONE ENCOUNTER
Telephone Encounter by Orin Villanueva CMA at 7/2/2021  5:05 PM     Author: Orin Villanueva CMA Service: -- Author Type: Certified Medical Assistant    Filed: 7/2/2021  5:06 PM Encounter Date: 7/2/2021 Status: Signed    : Orin Villanueva CMA (Certified Medical Assistant)       Left message to call back for: Valentina  Information to relay to patient:  Please notify patient of Dr. Vanegas's message.

## 2021-07-04 NOTE — TELEPHONE ENCOUNTER
Telephone Encounter by Breana Puente MD at 6/29/2021  3:26 PM     Author: Breana Puente MD Service: Psychiatry Author Type: Physician    Filed: 6/29/2021  3:27 PM Encounter Date: 6/29/2021 Status: Signed    : Breana Puente MD (Physician)       Signed.  Given to MA to fax.

## 2021-07-04 NOTE — TELEPHONE ENCOUNTER
Telephone Encounter by Promise Vanegas MD at 7/2/2021  8:43 AM     Author: Promise Vanegas MD Service: -- Author Type: Physician    Filed: 7/2/2021  8:44 AM Encounter Date: 7/2/2021 Status: Signed    : Promise Vanegas MD (Physician)       OK to proceed with orders as requested.

## 2021-07-04 NOTE — TELEPHONE ENCOUNTER
Telephone Encounter by Gustavo Gamez RN at 6/22/2021  4:02 PM     Author: Gustavo Gamez RN Service: -- Author Type: Registered Nurse    Filed: 6/22/2021  5:12 PM Encounter Date: 6/22/2021 Status: Signed    : Gustavo Gamez RN (Registered Nurse)       ANTICOAGULATION MANAGEMENT     Valentina Olmedo 61 y.o., female is on warfarin with Subtherapeutic INR result (goal range 2.0-3.0)    Recent labs: (last 7 days)     06/22/21  1357   INR 1.50*       ASSESSMENT     Source: Patient/Caregiver Call      Warfarin dosing taken: Warfarin taken as instructed    Diet: No new diet changes affecting INR    Illness, Injury or hospitalization: No    Medication changes: None    Signs or symptoms of bleeding or clotting: No    Previous INR: subtherapeutic    Additional findings: None     PLAN     Recommended plan for no diet, medication or health factor changes affecting INR:     Dosing instructions: Increase your warfarin dose to 10 mg daily on mon/wed/fri; and 15 mg daily rest of week (12.5% change)    Follow up no later than: 1 week     Telephone call with Valentina who verbalizes understanding and agrees to plan. Also left message for nurse [place]    Lab visit scheduled  :     Plan made per [facility] anticoagulation protocol    Gustavo Gamez  Anticoagulation Clinic   [phone]    Anticoagulation Episode Summary     Current INR goal:  2.0-3.0   TTR:  42.7 % (1 y)   Next INR check:  6/29/2021   INR from last check:  1.50 (6/22/2021)   Weekly max warfarin dose:     Target end date:     INR check location:     Preferred lab:     Send INR reminders to:  [facility]    Indications    History of pulmonary embolism [Z86.711]           Comments:           Anticoagulation Care Providers     Provider Role Specialty Phone number    Donald Rooney MD Referring Internal Medicine [phone]    Promise Vanegas MD Responsible Family Medicine [phone]

## 2021-07-04 NOTE — TELEPHONE ENCOUNTER
Telephone Encounter by Gustavo Gamez RN at 6/1/2021  3:50 PM     Author: Gustavo Gamez RN Service: -- Author Type: Registered Nurse    Filed: 6/1/2021  4:18 PM Encounter Date: 6/1/2021 Status: Signed    : Gustavo Gamez RN (Registered Nurse)       ANTICOAGULATION  MANAGEMENT    Assessment     Today's INR result of 1.7 is Subtherapeutic (goal INR of 2.0-3.0)        Missed dose(s) may be affecting INR    No new diet changes affecting INR    No new medication/supplements affecting INR    Continues to tolerate warfarin with no reported s/s of bleeding or thromboembolism     Previous INR was Therapeutic    Plan:     Spoke on phone with Valentina and nurse Smith regarding INR result and instructed:      Warfarin Dosing Instructions:  Change warfarin dose to 10 mg daily  (7.7 % change)    Instructed patient to follow up no later than: two weeks  :     René verbalizes understanding and agrees to warfarin dosing plan.    Instructed to call the [facility] for any changes, questions or concerns. (#[phone])   ?   Gustavo Gamez RN    Subjective/Objective:      Valentina Olmedo, a 61 y.o. female is on warfarin. Valentina Montgomery reports:     Home warfarin dose: verbally confirmed home dose with Valentina and updated on anticoagulation calendar     Missed doses: No     Medication changes:  No     S/S of bleeding or thromboembolism:  No     New Injury or illness:  No     Changes in diet or alcohol consumption:  No     Upcoming surgery, procedure or cardioversion:  No    Anticoagulation Episode Summary     Current INR goal:  2.0-3.0   TTR:  42.7 % (1 y)   Next INR check:  6/15/2021   INR from last check:  1.70 (6/1/2021)   Weekly max warfarin dose:     Target end date:     INR check location:     Preferred lab:     Send INR reminders to:  [facility]    Indications    History of pulmonary embolism [Z86.711]           Comments:           Anticoagulation Care Providers     Provider Role Specialty Phone number     Donald Rooney MD [facility] Internal Medicine [phone]    Promise Vanegas MD [facility] Medicine [phone]

## 2021-07-05 NOTE — TELEPHONE ENCOUNTER
Telephone Encounter by Enedina Obrien HUC at 7/5/2021 12:01 PM     Author: Enedina Obrien HUC Service: -- Author Type: Coordinator    Filed: 7/5/2021 12:05 PM Encounter Date: 7/2/2021 Status: Signed    : Enedina Obrien HUC (Coordinator)       Patient called back, message relayed. She will start the 300mcg this Thursday  when her nurse comes to set up the medications.

## 2021-07-05 NOTE — TELEPHONE ENCOUNTER
Telephone Encounter by Virginie Clarke CMA at 7/5/2021  9:57 AM     Author: Virginie Clarke CMA Service: -- Author Type: Medical Assistant    Filed: 7/5/2021  9:58 AM Encounter Date: 7/2/2021 Status: Signed    : Virginie Clarke CMA (Medical Assistant)       Left message to call back for: results  Information to relay to patient: please see below and relay

## 2021-07-06 ENCOUNTER — COMMUNICATION - HEALTHEAST (OUTPATIENT)
Dept: ANTICOAGULATION | Facility: CLINIC | Age: 61
End: 2021-07-06

## 2021-07-06 ENCOUNTER — AMBULATORY - HEALTHEAST (OUTPATIENT)
Dept: LAB | Facility: CLINIC | Age: 61
End: 2021-07-06

## 2021-07-06 DIAGNOSIS — Z86.711 HISTORY OF PULMONARY EMBOLISM: ICD-10-CM

## 2021-07-06 LAB — INR PPP: 2.5 (ref 0.9–1.1)

## 2021-07-06 NOTE — TELEPHONE ENCOUNTER
Telephone Encounter by Gustavo Gamez RN at 7/6/2021  5:08 PM     Author: Gustavo Gamez RN Service: -- Author Type: Registered Nurse    Filed: 7/6/2021  5:15 PM Encounter Date: 7/6/2021 Status: Signed    : Gustavo Gamez RN (Registered Nurse)       ANTICOAGULATION MANAGEMENT     Valentina Olmedo 61 y.o., female is on warfarin with Therapeutic INR result (goal range 2.0-3.0)    Recent labs: (last 7 days)     07/06/21  1324   INR 2.50*       ASSESSMENT     Source: Chart Review      Warfarin dosing taken: Warfarin taken as instructed    Diet: No new diet changes affecting INR    Illness, Injury or hospitalization: No    Medication changes: None    Signs or symptoms of bleeding or clotting: No    Previous INR: therapeutic last visit; previously outside of goal range    Additional findings: None     PLAN     Recommended plan for no diet, medication or health factor changes affecting INR:     Dosing instructions: Continue your current warfarin dose 10 mg daily on mon/wed/fri; and 15 mg daily rest of week (0% change)    Follow up no later than: 2 weeks     Telephone call with Valentina who verbalizes understanding and agrees to plan. Message left for nurse Russell    Lab visit scheduled  :     Plan made per [facility] anticoagulation protocol    Gustavo Gamez  Anticoagulation Clinic   [phone]    Anticoagulation Episode Summary     Current INR goal:  2.0-3.0   TTR:  45.9 % (1 y)   Next INR check:  7/20/2021   INR from last check:  2.50 (7/6/2021)   Weekly max warfarin dose:     Target end date:     INR check location:     Preferred lab:     Send INR reminders to:  [facility]    Indications    History of pulmonary embolism [Z86.711]           Comments:           Anticoagulation Care Providers     Provider Role Specialty Phone number    Donald Rooney MD Referring Internal Medicine [phone]    Promise Vanegas MD Responsible Family Medicine [phone]

## 2021-07-07 NOTE — TELEPHONE ENCOUNTER
RECEIVED HOME HEALTH NURSING SERVICE ORDER FOR REVIEW AND SIGNATURE.  PLACED IN PROVIDER'S CLINIC MAILBOX

## 2021-07-08 ENCOUNTER — COMMUNICATION - HEALTHEAST (OUTPATIENT)
Dept: FAMILY MEDICINE | Facility: CLINIC | Age: 61
End: 2021-07-08

## 2021-07-08 DIAGNOSIS — I26.99 PULMONARY EMBOLISM WITH INFARCTION (H): Primary | ICD-10-CM

## 2021-07-08 DIAGNOSIS — Z79.01 LONG TERM (CURRENT) USE OF ANTICOAGULANTS: ICD-10-CM

## 2021-07-08 NOTE — TELEPHONE ENCOUNTER
Telephone Encounter by Orin Villanueva CMA at 7/8/2021  2:13 PM     Author: Orin Villanueva CMA Service: -- Author Type: Certified Medical Assistant    Filed: 7/8/2021  2:13 PM Encounter Date: 7/8/2021 Status: Signed    : Orin Villanueva CMA (Certified Medical Assistant)       FAXED.

## 2021-07-09 ENCOUNTER — COMMUNICATION - HEALTHEAST (OUTPATIENT)
Dept: BEHAVIORAL HEALTH | Facility: CLINIC | Age: 61
End: 2021-07-09

## 2021-07-09 NOTE — TELEPHONE ENCOUNTER
Telephone Encounter by Sandra Cook at 7/9/2021  9:33 AM     Author: Sandra Cook Service: Psychiatry Author Type: --    Filed: 7/9/2021  9:34 AM Encounter Date: 7/9/2021 Status: Signed    : Sandra Cook       Physicians Order fax received needing signature.  Patient has appointment on Tuesday 7-13-21.    Order put in physicians clinic mail box and note was put on the appt. In [facility] that form is in her mailbox

## 2021-07-11 PROBLEM — Z86.711 HISTORY OF PULMONARY EMBOLISM: Status: ACTIVE | Noted: 2021-07-11

## 2021-07-13 ENCOUNTER — VIRTUAL VISIT (OUTPATIENT)
Dept: BEHAVIORAL HEALTH | Facility: CLINIC | Age: 61
End: 2021-07-13
Payer: MEDICARE

## 2021-07-13 DIAGNOSIS — Z79.899 HIGH RISK MEDICATION USE: ICD-10-CM

## 2021-07-13 DIAGNOSIS — F17.218 NICOTINE DEPENDENCE, CIGARETTES, WITH OTHER NICOTINE-INDUCED DISORDERS: ICD-10-CM

## 2021-07-13 DIAGNOSIS — F25.1 SCHIZOAFFECTIVE DISORDER, DEPRESSIVE TYPE (H): Primary | ICD-10-CM

## 2021-07-13 DIAGNOSIS — G47.33 OSA (OBSTRUCTIVE SLEEP APNEA): ICD-10-CM

## 2021-07-13 PROCEDURE — 99443 PR PHYSICIAN TELEPHONE EVALUATION 21-30 MIN: CPT | Mod: 95 | Performed by: PSYCHIATRY & NEUROLOGY

## 2021-07-13 RX ORDER — LAMOTRIGINE 200 MG/1
200 TABLET ORAL 2 TIMES DAILY
Qty: 270 TABLET | Refills: 1 | Status: CANCELLED | OUTPATIENT
Start: 2021-07-13

## 2021-07-13 RX ORDER — OMEPRAZOLE 10 MG/1
CAPSULE, DELAYED RELEASE ORAL
COMMUNITY
Start: 2021-07-02 | End: 2021-08-06

## 2021-07-13 RX ORDER — BRIVARACETAM 100 MG/1
TABLET, FILM COATED ORAL
COMMUNITY
Start: 2021-06-28 | End: 2021-08-06

## 2021-07-13 RX ORDER — LORATADINE 10 MG/1
TABLET ORAL
COMMUNITY
Start: 2021-06-25 | End: 2021-07-25

## 2021-07-13 RX ORDER — DULOXETIN HYDROCHLORIDE 60 MG/1
CAPSULE, DELAYED RELEASE ORAL
COMMUNITY
Start: 2021-06-23 | End: 2021-08-06

## 2021-07-13 RX ORDER — QUETIAPINE FUMARATE 200 MG/1
200 TABLET, FILM COATED ORAL AT BEDTIME
Qty: 90 TABLET | Refills: 1 | Status: SHIPPED | OUTPATIENT
Start: 2021-07-13 | End: 2022-01-11

## 2021-07-13 RX ORDER — LANOLIN ALCOHOL/MO/W.PET/CERES
6 CREAM (GRAM) TOPICAL
Qty: 180 TABLET | Refills: 1 | Status: SHIPPED | OUTPATIENT
Start: 2021-07-13 | End: 2021-08-10

## 2021-07-13 RX ORDER — LEVOTHYROXINE SODIUM 100 UG/1
TABLET ORAL
COMMUNITY
Start: 2021-07-02 | End: 2021-08-06

## 2021-07-13 RX ORDER — DULOXETIN HYDROCHLORIDE 60 MG/1
120 CAPSULE, DELAYED RELEASE ORAL
COMMUNITY
Start: 2021-04-06 | End: 2021-07-13

## 2021-07-13 RX ORDER — DULOXETIN HYDROCHLORIDE 60 MG/1
120 CAPSULE, DELAYED RELEASE ORAL DAILY
Qty: 180 CAPSULE | Refills: 1 | Status: SHIPPED | OUTPATIENT
Start: 2021-07-13 | End: 2022-01-11

## 2021-07-13 RX ORDER — POLYETHYLENE GLYCOL 3350 17 G
2 POWDER IN PACKET (EA) ORAL
Qty: 168 LOZENGE | Refills: 1 | Status: SHIPPED | OUTPATIENT
Start: 2021-07-13 | End: 2021-10-12

## 2021-07-13 RX ORDER — FLUTICASONE PROPIONATE 50 MCG
SPRAY, SUSPENSION (ML) NASAL
COMMUNITY
Start: 2021-06-24 | End: 2021-08-06

## 2021-07-13 RX ORDER — LAMOTRIGINE 200 MG/1
TABLET ORAL
COMMUNITY
Start: 2021-06-23 | End: 2021-07-20

## 2021-07-13 NOTE — PROGRESS NOTES
"Valentina Olmedo is a 61 y.o. female who is being evaluated via a billable telemedicine visit.    How would you like to obtain your AVS? AVS Preference: Mail a copy.      Medical Decision Making:   Schizoaffective disorder, depressive type (H)  Stable on current meds.  Seroquel 200 mg nightly, Cymbalta 120 mg twice daily.  Continue therapy with Loretta.    Nicotine dependence  Wants to quit.  Helped her  Ayden quit 12 yrs ago.   Plan: NRT lozenge 2 mg as needed.  \"My neurologist said I should not use the patch\".    CHRIS (obstructive sleep apnea)  Using CPAP as best she can. Can't tolerate mouth appliance or nasal appliance.  C/o \"cannot breathe\" 2/2 allergies.  Plan: Pt to discuss at sleep appt in July.    High risk medication use  On neuroleptic med. Today, pt denied involuntary movements.   Plan: The risks and benefits of medication discussed with the pt including metabolic s/e's, TD, CV risk.  Next appt in person to complete DISCUS    Insomnia  Smokes cigarettes 30 to 60 minutes before bed.  Uses caffeine after 5 PM.    Plan: Teaching done- do not smoke 1 hour before bed. Stop caffeine at noon.      Return in about 3 mos.    Breana Puente MD  [facility]         History of Present Illness:   Valentina Olmedo is a 61 y.o. female here for SCAD, bipolar type, PTSD, Neurocognitive disorder, seizure disorder, hx delirium. Last appt 4/6/21.      Engaging in home PT/OT for strength/balance.      In-home services:  -[facility]  -Med set up q Monday  -[facility] [employer] [facility], [place] [facility]    Weekly INR draw at clinic (3 min drive).      SOC: Chronic, René. Cat, \"Yosi\".       ROS:  Denies SI/manic/psychotic sx.  See HPI, otherwise negative.          Current Outpatient Medications:      albuterol (ALBUTEROL) 108 (90 BASE) MCG/ACT inhaler, Inhale 2 puffs into the lungs, Disp: , Rfl:      Calcium Carbonate Antacid (TUMS LASTING EFFECTS PO), Take 2 tablets by mouth " two times daily, Disp: , Rfl:      carBAMazepine (TEGRETOL) 200 MG tablet, Take 2 tablets (400 mg) by mouth 2 times daily, Disp: 120 tablet, Rfl: 0     cholecalciferol (VITAMIN D-3 SUPER STRENGTH) 2000 UNITS tablet, Take 2,000 Units by mouth, Disp: , Rfl:      DULoxetine (CYMBALTA) 60 MG capsule, Take 2 capsules (120 mg) by mouth daily, Disp: 180 capsule, Rfl: 1     EPINEPHrine 0.15 MG/0.15ML SOAJ, Inject 1 each into the muscle, Disp: , Rfl:      lamoTRIgine (LAMICTAL) 25 MG tablet, , Disp: , Rfl:      levothyroxine (SYNTHROID, LEVOTHROID) 125 MCG tablet, Take 2 tablets (250 mcg) by mouth every morning (before breakfast), Disp: 60 tablet, Rfl: 0     melatonin 3 MG tablet, Take 2 tablets (6 mg) by mouth nightly as needed for sleep, Disp: 180 tablet, Rfl: 1     nicotine (COMMIT) 2 MG lozenge, Place 1 lozenge (2 mg) inside cheek every hour as needed for smoking cessation, Disp: 168 lozenge, Rfl: 1     olopatadine (PATANOL) 0.1 % ophthalmic solution, 1 drop, Disp: , Rfl:      omeprazole (PRILOSEC) 20 MG DR capsule, Take 20 mg by mouth 2 times daily , Disp: , Rfl:      oxybutynin (DITROPAN-XL) 10 MG 24 hr tablet, Take one tablet by mouth twice daily., Disp: , Rfl:      QUEtiapine (SEROQUEL) 200 MG tablet, Take 1 tablet (200 mg) by mouth At Bedtime, Disp: 90 tablet, Rfl: 1     rosuvastatin (CRESTOR) 20 MG tablet, Take 20 mg by mouth, Disp: , Rfl:      Sennosides (SENNA) 8.6 MG CAPS, , Disp: , Rfl:      warfarin ANTICOAGULANT (COUMADIN) 10 MG tablet, Take by mouth daily 10 mg Daily except on Tuesdays 12.5 mg, Disp: , Rfl:      BRIVIACT 100 MG tablet, , Disp: , Rfl:      DULoxetine (CYMBALTA) 60 MG capsule, , Disp: , Rfl:      fluticasone (FLONASE) 50 MCG/ACT nasal spray, , Disp: , Rfl:      lamoTRIgine (LAMICTAL) 200 MG tablet, , Disp: , Rfl:      levothyroxine (SYNTHROID/LEVOTHROID) 100 MCG tablet, , Disp: , Rfl:      loratadine (CLARITIN) 10 MG tablet, , Disp: , Rfl:      omeprazole (PRILOSEC) 10 MG DR capsule, , Disp: ,  Rfl:        MSE:      Vital signs: not currently breastfeeding.    Alert & oriented x 3.   Appearance: Not observed  Speech: Normal rate, rhythm and tone.  Gait: Not observed  Musculoskeletal: Not observed  Mood/Affect: Neutral.  Thought Process: Normal rate, logical.  Thought Content: No suicide or homicide ideation.  Associations: Intact, no delusions.  Perceptions: No hallucinations.  Memory: recent and remote memory intact.  Attention span and concentration: normal.  Language: Intact.  Fund of Knowledge: Normal.  Insight and Judgement: Adequate.       Telemedicine Visit Details  Type of service:  Telephone Visit    Originating Location (pt. Location): Home    Distant Location (provider location):  [facility]     Platform used for Video Visit: Unable to complete video visit     This visit started at: 10:00  AM and concluded at: 10:40 AM        Total time spent on this encounter, on the date of service, including pre-visit review of separately obtained history, face-to-face interaction performing medically appropriate physical exam, patient counseling/education, interpretation of diagnostic results, care coordination and documentation was 45 minutes.            Breana Puente MD

## 2021-07-13 NOTE — PROGRESS NOTES
This video/telephone visit will be conducted via a call between you and your physician/provider. We have found that certain health care needs can be provided without the need for an in-person physical exam. This service lets us provide the care you need with a video /telephone conversation. If a prescription is necessary we can send it directly to your pharmacy. If lab work is needed we can place an order for that and you can then stop by our lab to have the test done at a later time.    Just as we bill insurance for in-person visits, we also bill insurance for video/telephone visits. If you have questions about your insurance coverage, we recommend that you speak with your insurance company.    Patient has given verbal consent for video/Telephone visit? Yes   Patient would like telephone visit please call: [phone]  [facility]     Medication refill is pended for review and approval by provider.    Patient verified allergies, medications and pharmacy via phone.  Patient states she is ready for visit.  [place]  to be reviewed by provider.

## 2021-07-14 PROBLEM — F32.1 MODERATE MAJOR DEPRESSION (H): Status: RESOLVED | Noted: 2018-09-13 | Resolved: 2019-12-18

## 2021-07-14 NOTE — PROGRESS NOTES
________________________________________  Medications Phoned  to Pharmacy [] yes [x]no  Name of Pharmacist:  List Medications, including dose, quantity and instructions    Medications ordered this visit were e-scribed.  Verified by order class [x] yes  [] no  Commit 2 mg lozenges; Cymbalta 60 mg; melatonin 3 mg; Seroquel 200 mg   Medication changes or discontinuations were communicated to patient's pharmacy: [] yes  [x] no    Dictation completed at time of chart check: [x] yes  [] no    I have checked the documentation for today s encounters and the above information has been reviewed and completed.

## 2021-07-15 ENCOUNTER — TELEPHONE (OUTPATIENT)
Dept: BEHAVIORAL HEALTH | Facility: CLINIC | Age: 61
End: 2021-07-15

## 2021-07-15 DIAGNOSIS — Z53.9 DIAGNOSIS NOT YET DEFINED: Primary | ICD-10-CM

## 2021-07-15 PROCEDURE — G0180 MD CERTIFICATION HHA PATIENT: HCPCS | Performed by: FAMILY MEDICINE

## 2021-07-15 NOTE — TELEPHONE ENCOUNTER
[facility], Re-cert, please make changes as needed, sign, date, and fax back.  Located in file drawer.

## 2021-07-15 NOTE — TELEPHONE ENCOUNTER
RECEIVED HOME HEALTH PHYSICAL THERAPY ORDER FOR REVIEW AND SIGNATURE FROM [facility] - PLACED IN PROVIDER'S CLINIC MAILBOX

## 2021-07-20 ENCOUNTER — ANTICOAGULATION THERAPY VISIT (OUTPATIENT)
Dept: ANTICOAGULATION | Facility: CLINIC | Age: 61
End: 2021-07-20

## 2021-07-20 ENCOUNTER — LAB (OUTPATIENT)
Dept: LAB | Facility: CLINIC | Age: 61
End: 2021-07-20
Payer: MEDICARE

## 2021-07-20 ENCOUNTER — MEDICAL CORRESPONDENCE (OUTPATIENT)
Dept: HEALTH INFORMATION MANAGEMENT | Facility: CLINIC | Age: 61
End: 2021-07-20

## 2021-07-20 DIAGNOSIS — Z79.01 LONG TERM (CURRENT) USE OF ANTICOAGULANTS: ICD-10-CM

## 2021-07-20 DIAGNOSIS — Z86.711 HISTORY OF PULMONARY EMBOLISM: Primary | ICD-10-CM

## 2021-07-20 DIAGNOSIS — I26.99 PULMONARY EMBOLISM WITH INFARCTION (H): ICD-10-CM

## 2021-07-20 LAB — INR BLD: 2.1 (ref 0.9–1.1)

## 2021-07-20 PROCEDURE — 85610 PROTHROMBIN TIME: CPT

## 2021-07-20 PROCEDURE — 36416 COLLJ CAPILLARY BLOOD SPEC: CPT

## 2021-07-20 RX ORDER — CARBAMAZEPINE 200 MG/1
300-400 TABLET ORAL
COMMUNITY
End: 2022-08-02

## 2021-07-20 NOTE — PROGRESS NOTES
ANTICOAGULATION MANAGEMENT     Valentina Olmedo 61 year old female is on warfarin with therapeutic INR result. (Goal INR 2.0-3.0)    Recent labs: (last 7 days)     07/20/21  1319   INR 2.1*       ASSESSMENT     Source(s): Patient/Caregiver Call and Template       Warfarin doses taken: Warfarin taken as instructed    Diet: No new diet changes identified    New illness, injury, or hospitalization: No    Medication/supplement changes: None noted    Signs or symptoms of bleeding or clotting: No    Previous INR: Therapeutic last 2(+) visits    Additional findings: None     Called dosing and appt time to  Sarah DUENAS     Recommended plan for no diet, medication or health factor changes affecting INR     Dosing Instructions: Continue your current warfarin dose with next INR in 3 weeks       Summary  As of 7/20/2021    Full warfarin instructions:  10 mg every Mon, Wed, Fri; 15 mg all other days   Next INR check:  8/9/2021             Telephone call with Valentina who verbalizes understanding and agrees to plan    Lab visit scheduled    Education provided: None required    Plan made per [facility] anticoagulation protocol    Gustavo Gamez RN  Anticoagulation Clinic  7/20/2021    _______________________________________________________________________     Anticoagulation Episode Summary     Current INR goal:  2.0-3.0   TTR:  49.8 % (1 y)   Target end date:     Send INR reminders to:  [facility]    Indications    History of pulmonary embolism [Z86.711]           Comments:           Anticoagulation Care Providers     Provider Role Specialty Phone number    Donald Rooney MD Referring Internal Medicine [phone]    Promise Vanegas MD Responsible Family Medicine [phone]

## 2021-07-20 NOTE — TELEPHONE ENCOUNTER
Writer faxed back to Xiao Dias RN at [facility]  Faxed to [phone]    Confirmation received sent Physician's orders onto scanning to be included in Pt's chart.

## 2021-07-20 NOTE — TELEPHONE ENCOUNTER
Writer faxed back to Sarah Haywood RN at [facility]  Faxed to [phone]    Confirmation received sent paperwork onto scanning to be included in Pt's chat.

## 2021-07-21 DIAGNOSIS — J30.1 SEASONAL ALLERGIC RHINITIS DUE TO POLLEN: Primary | ICD-10-CM

## 2021-07-25 RX ORDER — LORATADINE 10 MG/1
TABLET ORAL
Qty: 90 TABLET | Refills: 3 | Status: SHIPPED | OUTPATIENT
Start: 2021-07-25 | End: 2022-04-07

## 2021-07-26 NOTE — TELEPHONE ENCOUNTER
"Last Written Prescription Date:  5/6/21  Last Fill Quantity: 30,  # refills: 2   Last office visit provider:  7/1/21     Requested Prescriptions   Pending Prescriptions Disp Refills     loratadine (CLARITIN) 10 MG tablet [Pharmacy Med Name: Loratadine 10MG TABS] 30 tablet      Sig: TAKE 1 TABLET (10MG) BY MOUTH DAILY       Antihistamines Protocol Passed - 7/21/2021  2:49 PM        Passed - Patient is 3-64 years of age     Apply weight-based dosing for peds patients age 3 - 12 years of age.    Forward request to provider for patients under the age of 3 or over the age of 64.          Passed - Recent (12 mo) or future (30 days) visit within the authorizing provider's specialty     Patient has had an office visit with the authorizing provider or a provider within the authorizing providers department within the previous 12 mos or has a future within next 30 days. See \"Patient Info\" tab in inbasket, or \"Choose Columns\" in Meds & Orders section of the refill encounter.              Passed - Medication is active on med list             elizabeth solis RN 07/25/21 7:17 PM  "

## 2021-07-28 NOTE — PROGRESS NOTES
"Valentina Olmedo is a 61 year old female being evaluated via a billable telephone visit.     \"This telephone visit will be conducted via a call between you and your physician/provider. We have found that certain health care needs can be provided without the need for an in-person visit or physical exam.  This service lets us provide the care you need with a telephone conversation.  If a prescription is necessary we can send it directly to your pharmacy.  If lab work is needed we can place an order for that and you can then stop by our lab to have the test done at a later time.\"    Telephone visits are billed at different rates depending on your insurance coverage.  Please reach out to your insurance provider with any questions.    Patient has given verbal consent for  a Telephone visit? Yes    What telephone number would you like your provider to contact at at:  [phone]     How would you like to obtain your AVS? Mail a copy    Gracie Whitten MA    Telephone Visit Details:     Telephone Visit Start Time: 2:00 PM    Telephone Visit End Time:2:12 PM    [facility]  Outpatient Sleep Medicine Encounter, Established Patient      Name: Valentina Olmedo MRN# [id number]   Age: 61 year old YOB: 1960     Date of Visit: July 29, 2021  Primary care provider: Promise Vanegas    Assessment and Plan:     1. CHRIS (obstructive sleep apnea)  Discussed again with the patient that her sleep apnea severity is severe but is responding to treatment with CPAP therapy.  I encouraged the patient to continue PAP therapy use nightly and work up to using it throughout the entire sleep period.  I recommended that she contact the medical equipment company for a mask fitting, as the interface should not be painful.  Follow up in 2 months for compliance check.    2. Seasonal allergic rhinitis, unspecified trigger  I provided information about ways to optimize treatment with Flonase.  " I suggested that she increase her use of fluticasone nasal spray to twice daily.  Discussed the benefit of allergen covers.  She is clear in that her nasal congestion has a seasonal component.  I recommended she see an allergist for further work-up and care and the patient reaffirms her intention to pursue an appointment.    History:     Valentina Olmedo is a 61 year old female whose visit was conducted via telephone today.  Her last visit was on May 25, 2021.  She is prescribed AutoPap therapy for treatment of severe obstructive sleep apnea.  2/3/2021 Dx PSG AHI 91, SpO2 sheri 78.6%, 9.2 minutes <= 88%.     I recommended that she obtain a full face mask due to prominent allergy symptoms and the patient was able to do so.  I also increased the upper pressure limit from 9 to 12 cm water.  Patient reports that the FFM hurts her face; she is open to an appointment with DME for mask fitting.  Allergies are still prominent; she has not made an appointment with an allergy specialist.  She did try washing her bedding in hot water with minimal benefit to her nasal congestion.      Compliance data is improved although the patient is still not meeting minimum requirements.  The residual estimated AHI is also improved compared to her last visit.    ResMed   Auto-PAP 6.0 - 12.0 cmH2O 30 day usage data:    16% of days with > 4 hours of use. 2/30 days with no use.   Average use 136 minutes per day.   95%ile Leak 17.59 L/min.   CPAP 95% pressure 10.4 cm.   AHI 9.1 events per hour.       Medications:     Current Outpatient Medications   Medication Sig     albuterol (ALBUTEROL) 108 (90 BASE) MCG/ACT inhaler Inhale 2 puffs into the lungs     Brivaracetam (BRIVIACT) 100 MG tablet 2 (two) times a day.     BRIVIACT 100 MG tablet      Calcium Carbonate Antacid (TUMS LASTING EFFECTS PO) Take 2 tablets by mouth two times daily     carBAMazepine (TEGRETOL) 200 MG tablet Take 300-400 mg by mouth     carBAMazepine (TEGRETOL) 200 MG tablet  Take 2 tablets (400 mg) by mouth 2 times daily     cholecalciferol (VITAMIN D-3 SUPER STRENGTH) 2000 UNITS tablet Take 2,000 Units by mouth     DULoxetine (CYMBALTA) 60 MG capsule      DULoxetine (CYMBALTA) 60 MG capsule Take 2 capsules (120 mg) by mouth daily     EPINEPHrine 0.15 MG/0.15ML SOAJ Inject 1 each into the muscle     fluticasone (FLONASE) 50 MCG/ACT nasal spray      levothyroxine (SYNTHROID, LEVOTHROID) 125 MCG tablet Take 2 tablets (250 mcg) by mouth every morning (before breakfast)     levothyroxine (SYNTHROID/LEVOTHROID) 100 MCG tablet      loratadine (CLARITIN) 10 MG tablet TAKE 1 TABLET (10MG) BY MOUTH DAILY     melatonin 3 MG tablet Take 2 tablets (6 mg) by mouth nightly as needed for sleep     nicotine (COMMIT) 2 MG lozenge Place 1 lozenge (2 mg) inside cheek every hour as needed for smoking cessation     olopatadine (PATANOL) 0.1 % ophthalmic solution 1 drop     omeprazole (PRILOSEC) 10 MG DR capsule      omeprazole (PRILOSEC) 20 MG DR capsule Take 20 mg by mouth 2 times daily      oxybutynin (DITROPAN-XL) 10 MG 24 hr tablet Take one tablet by mouth twice daily.     QUEtiapine (SEROQUEL) 200 MG tablet Take 1 tablet (200 mg) by mouth At Bedtime     rosuvastatin (CRESTOR) 20 MG tablet Take 20 mg by mouth     Sennosides (SENNA) 8.6 MG CAPS      warfarin ANTICOAGULANT (COUMADIN) 10 MG tablet Take by mouth daily 10 mg Daily except on Tuesdays 12.5 mg     No current facility-administered medications for this visit.        Allergies   Allergen Reactions     Aspirin Shortness Of Breath     Bees Anaphylaxis     Latex Itching     Phenobarbital      Vistaril [Hydroxyzine] Other (See Comments)     Sluggish, unable to wake up     Gabapentin Rash       Past Medical History:     Past Medical History:   Diagnosis Date     Adrenal mass (H) 10/12/2015    Chen Null MD  They were incidentally discovered on the CAT scan of the abdomen from December 2011 which was done for evaluation of kidney stones.  F/up CT of the abdomen done on 6/26/12 showed a stable 5 mm nodular opacity in the right lower lung lobe, adjacent to the diagram. 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 According to the patient, the tumor was benign. She is not sure whether or not the tumor belonged to the thyroid gland. The surgery was done here at the [facility]. In January 2011, she was diagnosed with kidney stones. She was found to have an elevated calcium level of 11.7, with a PTH level of 368. Stone an     Primary hyperparathyroidism (H)            PTSD (post-traumatic stress disorder)      Pulmonary embolism with infarction (H) 1/12/2021     Recurrent otitis media      Seizure disorder (H)      Stroke (H)      Vertigo         Physical Examination:   There were no vitals taken for this visit.         This note has been dictated using voice recognition software. Any grammatical, typographical, or context distortions are unintentional and inherent to the software.    Kaley Baron MD   7/29/2021   [address], [address] [address] [phone]    Copy to: Promise Vanegas

## 2021-07-28 NOTE — PATIENT INSTRUCTIONS
Opening up your nose  Consider a routine to open up your nasal passages.  You can purchase fluticasone nasal spray (Flonase) and saline nasal spray over-the-counter.  This regimen can be used once a day or twice a day:      1. Spray saline inside your nose to loosen and evacuate mucus from the inside of your nasal cavity.    2. Blow your nose.  3. Spray fluticasone (Flonase), 1 to 2 sprays in each nostril.  Administer nasal sprays with your chin to your chest and the top of the nozzle inside your nostril. Flonase is a topical steroid which should help to reduce swelling in the nose and improve nasal breathing.        Surgeries by an otolaryngologist (ENT doctor) can help resolve nasal congestion and may reduce the severity of sleep apnea slightly. Nasal congestion does not cause apnea on its own, so these surgeries are usually not performed just for sleep apnea.  Nasal surgery may be worth considering if the your congestion is persistently  bothersome despite medication and allergy treatment

## 2021-07-29 ENCOUNTER — VIRTUAL VISIT (OUTPATIENT)
Dept: SLEEP MEDICINE | Facility: CLINIC | Age: 61
End: 2021-07-29
Payer: MEDICARE

## 2021-07-29 ENCOUNTER — TELEPHONE (OUTPATIENT)
Dept: BEHAVIORAL HEALTH | Facility: CLINIC | Age: 61
End: 2021-07-29

## 2021-07-29 DIAGNOSIS — J30.2 SEASONAL ALLERGIC RHINITIS, UNSPECIFIED TRIGGER: ICD-10-CM

## 2021-07-29 DIAGNOSIS — G47.33 OSA (OBSTRUCTIVE SLEEP APNEA): Primary | ICD-10-CM

## 2021-07-29 PROCEDURE — 99442 PR PHYSICIAN TELEPHONE EVALUATION 11-20 MIN: CPT | Mod: 95 | Performed by: PEDIATRICS

## 2021-07-29 NOTE — TELEPHONE ENCOUNTER
Physicians Order received requiring signature.    Form labeled and put in provider clinic mailbox to address and fax back.    Last visit:  7-13-21  Next visit   10-5-21

## 2021-07-30 ENCOUNTER — TELEPHONE (OUTPATIENT)
Dept: BEHAVIORAL HEALTH | Facility: CLINIC | Age: 61
End: 2021-07-30

## 2021-07-30 NOTE — TELEPHONE ENCOUNTER
Called Leonard back and told him that he should contact patient's pcp-possible concern for pcp to address. He called Dr. Puente due to her being the referring provider. He will contact pcp office.

## 2021-07-30 NOTE — TELEPHONE ENCOUNTER
I need more information than this in order to address this concern. How high has the blood pressure been running? Is the patient having any symptoms such as chest pain or shortness of breath? Is this a change in her blood pressure?

## 2021-07-30 NOTE — TELEPHONE ENCOUNTER
I reviewed her chart and her blood pressures have not been previously elevated. I would recommend an in person visit for evaluation

## 2021-07-30 NOTE — TELEPHONE ENCOUNTER
7/30/21 Received call from Leonard ([facility]) [phone], reported the patient stated having High Blood Pressure the past few days.

## 2021-08-03 ENCOUNTER — MEDICAL CORRESPONDENCE (OUTPATIENT)
Dept: HEALTH INFORMATION MANAGEMENT | Facility: CLINIC | Age: 61
End: 2021-08-03

## 2021-08-03 ENCOUNTER — TELEPHONE (OUTPATIENT)
Dept: BEHAVIORAL HEALTH | Facility: CLINIC | Age: 61
End: 2021-08-03

## 2021-08-03 PROBLEM — I26.99 PULMONARY EMBOLISM WITH INFARCTION (H): Status: RESOLVED | Noted: 2021-01-12 | Resolved: 2021-01-17

## 2021-08-03 NOTE — TELEPHONE ENCOUNTER
Writer faxed back to Kate Lepe at [facility]  Fax #: [phone]    Confirmation received transmission was successful.     Sent to scanning to be added to chart.

## 2021-08-06 ENCOUNTER — ANTICOAGULATION THERAPY VISIT (OUTPATIENT)
Dept: ANTICOAGULATION | Facility: CLINIC | Age: 61
End: 2021-08-06

## 2021-08-06 ENCOUNTER — OFFICE VISIT (OUTPATIENT)
Dept: FAMILY MEDICINE | Facility: CLINIC | Age: 61
End: 2021-08-06
Payer: MEDICARE

## 2021-08-06 VITALS
TEMPERATURE: 99 F | HEART RATE: 80 BPM | DIASTOLIC BLOOD PRESSURE: 80 MMHG | WEIGHT: 293 LBS | OXYGEN SATURATION: 95 % | SYSTOLIC BLOOD PRESSURE: 122 MMHG | HEIGHT: 67 IN | BODY MASS INDEX: 45.99 KG/M2 | RESPIRATION RATE: 12 BRPM

## 2021-08-06 DIAGNOSIS — Z86.711 HISTORY OF PULMONARY EMBOLISM: ICD-10-CM

## 2021-08-06 DIAGNOSIS — Z79.01 LONG TERM (CURRENT) USE OF ANTICOAGULANTS: ICD-10-CM

## 2021-08-06 DIAGNOSIS — Z86.711 HISTORY OF PULMONARY EMBOLISM: Primary | ICD-10-CM

## 2021-08-06 DIAGNOSIS — I10 ESSENTIAL HYPERTENSION: Primary | ICD-10-CM

## 2021-08-06 DIAGNOSIS — I87.8 VENOUS STASIS: ICD-10-CM

## 2021-08-06 PROBLEM — G62.0 POLYNEUROPATHY DUE TO DRUG (H): Status: ACTIVE | Noted: 2020-01-21

## 2021-08-06 PROBLEM — U07.1 CLINICAL DIAGNOSIS OF COVID-19: Status: ACTIVE | Noted: 2021-04-19

## 2021-08-06 PROBLEM — K21.9 ESOPHAGEAL REFLUX: Status: ACTIVE | Noted: 2020-01-07

## 2021-08-06 PROBLEM — J44.9 CHRONIC OBSTRUCTIVE PULMONARY DISEASE (H): Status: ACTIVE | Noted: 2020-01-07

## 2021-08-06 PROBLEM — Z66 DNR (DO NOT RESUSCITATE): Status: ACTIVE | Noted: 2018-09-13

## 2021-08-06 LAB — INR BLD: 3.4 (ref 0.9–1.1)

## 2021-08-06 PROCEDURE — 36416 COLLJ CAPILLARY BLOOD SPEC: CPT | Performed by: FAMILY MEDICINE

## 2021-08-06 PROCEDURE — 85610 PROTHROMBIN TIME: CPT | Performed by: FAMILY MEDICINE

## 2021-08-06 PROCEDURE — 99214 OFFICE O/P EST MOD 30 MIN: CPT | Performed by: FAMILY MEDICINE

## 2021-08-06 RX ORDER — HYDROCHLOROTHIAZIDE 25 MG/1
25 TABLET ORAL DAILY
Qty: 90 TABLET | Refills: 3 | Status: SHIPPED | OUTPATIENT
Start: 2021-08-06 | End: 2022-06-22

## 2021-08-06 RX ORDER — LEVOTHYROXINE SODIUM 300 UG/1
300 TABLET ORAL DAILY
COMMUNITY
Start: 2021-07-02 | End: 2021-09-16

## 2021-08-06 RX ORDER — FLUTICASONE PROPIONATE 50 MCG
1 SPRAY, SUSPENSION (ML) NASAL DAILY
COMMUNITY
Start: 2021-05-06 | End: 2022-04-01

## 2021-08-06 RX ORDER — WARFARIN SODIUM 5 MG/1
TABLET ORAL
COMMUNITY
Start: 2021-07-21 | End: 2021-09-16

## 2021-08-06 RX ORDER — OLOPATADINE HYDROCHLORIDE 2 MG/ML
1 SOLUTION/ DROPS OPHTHALMIC DAILY
COMMUNITY
Start: 2021-06-01 | End: 2022-05-27

## 2021-08-06 RX ORDER — SENNOSIDES 8.6 MG
2 TABLET ORAL 2 TIMES DAILY
COMMUNITY
Start: 2021-07-21 | End: 2021-09-15

## 2021-08-06 ASSESSMENT — MIFFLIN-ST. JEOR: SCORE: 2219.13

## 2021-08-06 NOTE — PROGRESS NOTES
ANTICOAGULATION MANAGEMENT     Valentina Olmedo 61 year old female is on warfarin with supratherapeutic INR result. (Goal INR 2.0-3.0)    Recent labs: (last 7 days)     08/06/21  1511   INR 3.4*       ASSESSMENT     Source(s): Chart Review       Warfarin doses taken: Warfarin taken as instructed    Diet: low appetite may be affecting diet and INR is starting breakfast shake    New illness, injury, or hospitalization: No    Medication/supplement changes: None noted    Signs or symptoms of bleeding or clotting: No    Previous INR: Therapeutic last 2(+) visits    Additional findings: None     PLAN     Recommended plan for temporary change(s) affecting INR     Dosing Instructions: Hold dose then continue your current warfarin dose with next INR in 2 weeks       Summary  As of 8/6/2021    Full warfarin instructions:  8/6: Hold; Otherwise 10 mg every Mon, Wed, Fri; 15 mg all other days   Next INR check:  8/20/2021             Telephone call with Valentina who verbalizes understanding and agrees to plan. Message to Sarah BOO    Check at provider office visit    Education provided: None required    Plan made per [facility] anticoagulation protocol    Gustavo Gamez RN  Anticoagulation Clinic  8/6/2021    _______________________________________________________________________     Anticoagulation Episode Summary     Current INR goal:  2.0-3.0   TTR:  49.0 % (1 y)   Target end date:     Send INR reminders to:  [facility]    Indications    History of pulmonary embolism [Z86.711]           Comments:           Anticoagulation Care Providers     Provider Role Specialty Phone number    Donald Rooney MD Referring Internal Medicine [phone]    Promise Vanegas MD Responsible Family Medicine [phone]

## 2021-08-06 NOTE — PROGRESS NOTES
Assessment/Plan:      Problem List Items Addressed This Visit        Digestive    BMI 50.0-59.9, adult (H)    Relevant Medications    Nutritional Supplements (NUTRITIONAL SHAKE HIGH PROTEIN) LIQD       Circulatory    Essential hypertension - Primary     Start HCTZ 25 mg daily. Recheck with labs and office visit in 2 weeks.         Relevant Medications    hydrochlorothiazide (HYDRODIURIL) 25 MG tablet    Venous stasis     Compression stockings encouraged, prescription provided. Patient also started on HCTZ.         Relevant Orders    Compression Sleeve/Stocking Order for DME - ONLY FOR DME       Other    History of pulmonary embolism      Other Visit Diagnoses     Long term (current) use of anticoagulants            Patient Instructions   1. Start HCTZ 25 mg daily.  2. Recheck in 2 weeks.           Subjective:   Valentina Olmedo is a 61 year old person who presents today with concerns about her blood pressure. She reports that it is running 120-140/ at home. She is checking with a wrist cuff. She reports she was on losartan previously but it didn't work well for her.    The patient is also requesting a prescription for meal replacement drinks for breakfast. Her home health aid was concerned she wasn't eating breakfast.    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     Adrenal mass, left (H)     BMI 50.0-59.9, adult (H)     Vitamin D deficiency     Nonintractable epilepsy without status epilepticus (H)     Esophageal reflux     Mixed hyperlipidemia     HLD (hyperlipidemia)     Hypothyroidism     Tobacco abuse     Osteopenia     Chronic obstructive pulmonary disease (H)     Schizoaffective disorder, depressive type (H)     History of COVID-19     DNR (do not resuscitate)      Migraine headache     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 F/up CT of the abdomen done on 6/26/12 showed a stable 5 mm nodular opacity in the right lower lung lobe, adjacent to the diagram. 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 According to the patient, the tumor was benign. She is not sure whether or not the tumor belonged to the thyroid gland. The surgery was done here at the [facility]. In January 2011, she was diagnosed with kidney stones. She was found to have an elevated calcium level of 11.7, with a PTH level of 368. Stone an     Primary hyperparathyroidism (H)            PTSD (post-traumatic stress disorder)      Pulmonary embolism with infarction (H) 1/12/2021     Recurrent otitis media      Seizure disorder (H)      Stroke (H)      Vertigo      Past Surgical History:   Procedure Laterality Date     ABDOMEN SURGERY       ADRENALECTOMY Left      BRAIN SURGERY       CRANIOTOMY FOR TEMPORAL LOBECTOMY Right     x3 for seizure     DILATION AND CURETTAGE       HEAD & NECK SURGERY       LITHOTRIPSY       PARATHYROID GLAND SURGERY    "   resection of adenoma     REFRACTIVE SURGERY Bilateral      RELEASE CARPAL TUNNEL Bilateral      TONSILLECTOMY & ADENOIDECTOMY       TOTAL THYROIDECTOMY       TOTAL VAGINAL HYSTERECTOMY      39 years old. Benign       Review of System: Relevant items noted in HPI. ROS otherwise negative.     Objective:     Vitals:    21 1429   BP: 122/80   BP Location: Left arm   Patient Position: Sitting   Cuff Size: Adult Large   Pulse: 80   Resp: 12   Temp: 99  F (37.2  C)   TempSrc: Oral   SpO2: 95%   Weight: (!) 162.4 kg (358 lb 2 oz)   Height: 1.689 m (5' 6.5\")        Physical Exam  Constitutional:       General: She is not in acute distress.     Appearance: She is not ill-appearing.   Cardiovascular:      Rate and Rhythm: Normal rate and regular rhythm.   Pulmonary:      Breath sounds: No wheezing or rhonchi.   Musculoskeletal:      Right lower le+ Edema present.      Left lower le+ Edema present.        "

## 2021-08-08 PROBLEM — I87.8 VENOUS STASIS: Status: ACTIVE | Noted: 2021-08-08

## 2021-08-10 ENCOUNTER — MEDICAL CORRESPONDENCE (OUTPATIENT)
Dept: HEALTH INFORMATION MANAGEMENT | Facility: CLINIC | Age: 61
End: 2021-08-10

## 2021-08-10 ENCOUNTER — TELEPHONE (OUTPATIENT)
Dept: FAMILY MEDICINE | Facility: CLINIC | Age: 61
End: 2021-08-10

## 2021-08-10 ENCOUNTER — TELEPHONE (OUTPATIENT)
Dept: BEHAVIORAL HEALTH | Facility: CLINIC | Age: 61
End: 2021-08-10

## 2021-08-10 DIAGNOSIS — F25.1 SCHIZOAFFECTIVE DISORDER, DEPRESSIVE TYPE (H): ICD-10-CM

## 2021-08-10 RX ORDER — PHENOL 1.4 %
10 AEROSOL, SPRAY (ML) MUCOUS MEMBRANE
Qty: 30 TABLET | Refills: 2 | Status: SHIPPED | OUTPATIENT
Start: 2021-08-10 | End: 2022-11-01

## 2021-08-10 NOTE — TELEPHONE ENCOUNTER
FYI  Nurse visit with Valentina yesterday, informed sunshine she experiences chest pain when using nicotine lozenges.  No radiation of pain.  Pt has stopped using lozenges at this time.

## 2021-08-10 NOTE — PROGRESS NOTES
Sarah, Home Health Nurse, was contacted, message left that Dr Puente had ordered increase in Melatonin.

## 2021-08-16 ENCOUNTER — TELEPHONE (OUTPATIENT)
Dept: FAMILY MEDICINE | Facility: CLINIC | Age: 61
End: 2021-08-16

## 2021-08-16 DIAGNOSIS — H10.13 ALLERGIC CONJUNCTIVITIS, BILATERAL: Primary | ICD-10-CM

## 2021-08-16 RX ORDER — BEPOTASTINE BESILATE 15 MG/ML
1 SOLUTION/ DROPS OPHTHALMIC 2 TIMES DAILY
Qty: 10 ML | Refills: 4 | Status: SHIPPED | OUTPATIENT
Start: 2021-08-16 | End: 2021-10-12

## 2021-08-16 NOTE — TELEPHONE ENCOUNTER
Reason for Call:  [facility] pharmacy is calling to get a replacement eye drop RX due to insurance coverage.    Recommends BEPREVE, 1.5% OR   AZELASTINE, 0.05% would be covered by patients insurance.    Detailed comments: please call back to advise.    Phone Number Patient can be reached at: Other phone number:  [phone]    Best Time: any    Can we leave a detailed message on this number? YES    Call taken on 8/16/2021 at 11:45 AM by Enedina Obrien

## 2021-08-17 ENCOUNTER — MEDICAL CORRESPONDENCE (OUTPATIENT)
Dept: HEALTH INFORMATION MANAGEMENT | Facility: CLINIC | Age: 61
End: 2021-08-17

## 2021-08-18 ENCOUNTER — VIRTUAL VISIT (OUTPATIENT)
Dept: BEHAVIORAL HEALTH | Facility: CLINIC | Age: 61
End: 2021-08-18
Payer: MEDICARE

## 2021-08-18 DIAGNOSIS — F25.1 SCHIZOAFFECTIVE DISORDER, DEPRESSIVE TYPE (H): Primary | ICD-10-CM

## 2021-08-18 DIAGNOSIS — F41.1 GENERALIZED ANXIETY DISORDER: ICD-10-CM

## 2021-08-18 DIAGNOSIS — F60.3 BORDERLINE PERSONALITY DISORDER (H): ICD-10-CM

## 2021-08-18 PROCEDURE — 90791 PSYCH DIAGNOSTIC EVALUATION: CPT | Mod: PO | Performed by: SOCIAL WORKER

## 2021-08-18 NOTE — PROGRESS NOTES
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 focus on:    Depressed Mood -    Anxiety -    Relational Problems related to: Conflict or difficulties with spouse  Functional Impairment at: home  Mood Instability -  .     4. Resources/Service Plan:    services are not indicated.   Modifications to assist communication are not indicated.   Additional disability accommodations consideration of mobility impairments.      5. Collaboration:   Collaboration / coordination of treatment will be initiated with the following  support professionals: case management, psychiatry and [facility] .      6.  Referrals:   The following referral(s) will be initiated: none at this time.     A Release of Information has been obtained for the following: case management and [facility] .    7. GERRY:    GERRY:  Discussed the general effects of drugs and alcohol on health and well-being. Provider gave patient printed information about the effects of chemical use on their health and well being. Recommendations:  None at this time.    8. Records:   These were reviewed at time of assessment.   Information in this assessment was obtained from the medical record and  provided by patient who is a fair and vague historian.    Patient will have open  access to their mental health medical record.        Provider Name/ Credentials:  LAMONT HAMILTON, BELKIS    August 18, 2021

## 2021-08-20 ENCOUNTER — ANTICOAGULATION THERAPY VISIT (OUTPATIENT)
Dept: ANTICOAGULATION | Facility: CLINIC | Age: 61
End: 2021-08-20

## 2021-08-20 ENCOUNTER — OFFICE VISIT (OUTPATIENT)
Dept: FAMILY MEDICINE | Facility: CLINIC | Age: 61
End: 2021-08-20
Payer: MEDICARE

## 2021-08-20 VITALS
RESPIRATION RATE: 12 BRPM | DIASTOLIC BLOOD PRESSURE: 82 MMHG | WEIGHT: 293 LBS | BODY MASS INDEX: 45.99 KG/M2 | HEART RATE: 84 BPM | HEIGHT: 67 IN | TEMPERATURE: 98.9 F | SYSTOLIC BLOOD PRESSURE: 110 MMHG

## 2021-08-20 DIAGNOSIS — Z86.711 HISTORY OF PULMONARY EMBOLISM: ICD-10-CM

## 2021-08-20 DIAGNOSIS — G62.9 PERIPHERAL POLYNEUROPATHY: ICD-10-CM

## 2021-08-20 DIAGNOSIS — Z79.01 LONG TERM (CURRENT) USE OF ANTICOAGULANTS: ICD-10-CM

## 2021-08-20 DIAGNOSIS — Z72.0 TOBACCO ABUSE: ICD-10-CM

## 2021-08-20 DIAGNOSIS — I87.8 VENOUS STASIS: ICD-10-CM

## 2021-08-20 DIAGNOSIS — Z86.711 HISTORY OF PULMONARY EMBOLISM: Primary | ICD-10-CM

## 2021-08-20 DIAGNOSIS — J44.9 CHRONIC OBSTRUCTIVE PULMONARY DISEASE, UNSPECIFIED COPD TYPE (H): ICD-10-CM

## 2021-08-20 DIAGNOSIS — R06.02 SHORTNESS OF BREATH: ICD-10-CM

## 2021-08-20 DIAGNOSIS — I10 ESSENTIAL HYPERTENSION: Primary | ICD-10-CM

## 2021-08-20 DIAGNOSIS — E03.9 ACQUIRED HYPOTHYROIDISM: ICD-10-CM

## 2021-08-20 LAB
ALBUMIN SERPL-MCNC: 3.6 G/DL (ref 3.5–5)
ANION GAP SERPL CALCULATED.3IONS-SCNC: 13 MMOL/L (ref 5–18)
BUN SERPL-MCNC: 14 MG/DL (ref 8–22)
CALCIUM SERPL-MCNC: 9.2 MG/DL (ref 8.5–10.5)
CHLORIDE BLD-SCNC: 101 MMOL/L (ref 98–107)
CO2 SERPL-SCNC: 25 MMOL/L (ref 22–31)
CREAT SERPL-MCNC: 0.75 MG/DL (ref 0.6–1.1)
GFR SERPL CREATININE-BSD FRML MDRD: 87 ML/MIN/1.73M2
GLUCOSE BLD-MCNC: 107 MG/DL (ref 70–125)
INR BLD: 2.8 (ref 0.9–1.1)
MAGNESIUM SERPL-MCNC: 1.8 MG/DL (ref 1.8–2.6)
PHOSPHATE SERPL-MCNC: 2.6 MG/DL (ref 2.5–4.5)
POTASSIUM BLD-SCNC: 3.4 MMOL/L (ref 3.5–5)
SODIUM SERPL-SCNC: 139 MMOL/L (ref 136–145)
T4 FREE SERPL-MCNC: 1.37 NG/DL (ref 0.7–1.8)
TSH SERPL DL<=0.005 MIU/L-ACNC: 0.53 UIU/ML (ref 0.3–5)
VIT B12 SERPL-MCNC: 433 PG/ML (ref 213–816)

## 2021-08-20 PROCEDURE — 84443 ASSAY THYROID STIM HORMONE: CPT | Performed by: FAMILY MEDICINE

## 2021-08-20 PROCEDURE — 83921 ORGANIC ACID SINGLE QUANT: CPT | Performed by: FAMILY MEDICINE

## 2021-08-20 PROCEDURE — 83735 ASSAY OF MAGNESIUM: CPT | Performed by: FAMILY MEDICINE

## 2021-08-20 PROCEDURE — 80069 RENAL FUNCTION PANEL: CPT | Performed by: FAMILY MEDICINE

## 2021-08-20 PROCEDURE — 84425 ASSAY OF VITAMIN B-1: CPT | Mod: 90 | Performed by: FAMILY MEDICINE

## 2021-08-20 PROCEDURE — 36415 COLL VENOUS BLD VENIPUNCTURE: CPT | Performed by: FAMILY MEDICINE

## 2021-08-20 PROCEDURE — 82607 VITAMIN B-12: CPT | Performed by: FAMILY MEDICINE

## 2021-08-20 PROCEDURE — 99000 SPECIMEN HANDLING OFFICE-LAB: CPT | Performed by: FAMILY MEDICINE

## 2021-08-20 PROCEDURE — 85610 PROTHROMBIN TIME: CPT | Performed by: FAMILY MEDICINE

## 2021-08-20 PROCEDURE — 84439 ASSAY OF FREE THYROXINE: CPT | Performed by: FAMILY MEDICINE

## 2021-08-20 PROCEDURE — 99214 OFFICE O/P EST MOD 30 MIN: CPT | Performed by: FAMILY MEDICINE

## 2021-08-20 PROCEDURE — 86803 HEPATITIS C AB TEST: CPT | Performed by: FAMILY MEDICINE

## 2021-08-20 RX ORDER — ALBUTEROL SULFATE 90 UG/1
2 AEROSOL, METERED RESPIRATORY (INHALATION) EVERY 4 HOURS PRN
Qty: 8 G | Refills: 4 | Status: SHIPPED | OUTPATIENT
Start: 2021-08-20 | End: 2023-12-29

## 2021-08-20 ASSESSMENT — MIFFLIN-ST. JEOR: SCORE: 2183.98

## 2021-08-20 NOTE — PATIENT INSTRUCTIONS
1. You should get a call to schedule CT scan.  2. Continue current medications.  3. We will notify you of lab results.  4. Check and lotion your feet daily.

## 2021-08-20 NOTE — ASSESSMENT & PLAN NOTE
Likely multifactorial. Symptoms are stable. Contributing factors include history of PE, tobacco use, COPD, morbid obesity, and deconditioning. No evidence of cardiac cause of symptoms. Recheck of her previous CT of the chest is reasonable given symptoms, history of PE, and ongoing tobacco use.

## 2021-08-20 NOTE — PROGRESS NOTES
ANTICOAGULATION MANAGEMENT     Valentina Olmedo 61 year old female is on warfarin with therapeutic INR result. (Goal INR 2.0-3.0)    Recent labs: (last 7 days)     08/20/21  1457   INR 2.8*       ASSESSMENT     Source(s): Chart Review, Patient/Caregiver Call and Template       Warfarin doses taken: Warfarin taken as instructed    Diet: No new diet changes identified    New illness, injury, or hospitalization: No    Medication/supplement changes: None noted    Signs or symptoms of bleeding or clotting: No    Previous INR: Therapeutic last 2(+) visits    Additional findings: called results and plan to nurse Aliciashglen     PLAN     Recommended plan for no diet, medication or health factor changes affecting INR     Dosing Instructions: Continue your current warfarin dose with next INR in 2 weeks       Summary  As of 8/20/2021    Full warfarin instructions:  10 mg every Mon, Wed, Fri; 15 mg all other days   Next INR check:  9/3/2021             Telephone call with Valentina who verbalizes understanding and agrees to plan    Lab visit scheduled    Education provided: None required    Plan made per [facility] anticoagulation protocol    Gustavo Gamez RN  Anticoagulation Clinic  8/20/2021    _______________________________________________________________________     Anticoagulation Episode Summary     Current INR goal:  2.0-3.0   TTR:  46.4 % (1 y)   Target end date:     Send INR reminders to:  [facility]    Indications    History of pulmonary embolism [Z86.711]           Comments:           Anticoagulation Care Providers     Provider Role Specialty Phone number    Donald Rooney MD Referring Internal Medicine [phone]    Promise Vanegas MD Responsible Family Medicine [phone]

## 2021-08-20 NOTE — ASSESSMENT & PLAN NOTE
Well controlled. She reports continued high pressures at home but she is using a wrist cuff and I suspect it isn't reading correctly. Continue current dosing of HCTZ 25 mg daily. Renal panel today.

## 2021-08-20 NOTE — PROGRESS NOTES
Assessment/Plan:      Problem List Items Addressed This Visit        Nervous and Auditory    Peripheral polyneuropathy     Vascular exam normal. No rash present.          Relevant Orders    Vitamin B12 (Completed)    Magnesium (Completed)    Methylmalonic Acid    Hepatitis C antibody (Completed)    Vitamin B1 whole blood       Respiratory    Chronic obstructive pulmonary disease (H)    Relevant Medications    albuterol (PROAIR HFA) 108 (90 Base) MCG/ACT inhaler    Shortness of breath     Likely multifactorial. Symptoms are stable. Contributing factors include history of PE, tobacco use, COPD, morbid obesity, and deconditioning. No evidence of cardiac cause of symptoms. Recheck of her previous CT of the chest is reasonable given symptoms, history of PE, and ongoing tobacco use.         Relevant Medications    albuterol (PROAIR HFA) 108 (90 Base) MCG/ACT inhaler    Other Relevant Orders    CT Chest w Contrast       Endocrine    Hypothyroidism     Recheck TSH due to dose increase early July.            Circulatory    Essential hypertension - Primary     Well controlled. She reports continued high pressures at home but she is using a wrist cuff and I suspect it isn't reading correctly. Continue current dosing of HCTZ 25 mg daily. Renal panel today.         Relevant Orders    Renal panel (Completed)    Venous stasis     Improved on HCTZ. She also has compression socks as needed.            Behavioral    Tobacco abuse     Cessation encouraged. Patient has cut back but isn't interested in quitting at this point.             Other    History of pulmonary embolism     Continue long term anticoagulation.         Relevant Orders    CT Chest w Contrast      Other Visit Diagnoses     Long term (current) use of anticoagulants            Patient Instructions   1. You should get a call to schedule CT scan.  2. Continue current medications.  3. We will notify you of lab results.  4. Check and lotion your feet daily.            Subjective:   Valentina Olmedo is a 61 year old person who presents today for recheck of her hypertension. She reports she is tolerating the HCTZ well.    She is also complaining of chronic issues with tingling and numbness in her fingers and feet. This has been ongoing for a few years. She reports there is tingling but not exactly painful most of the time.    She is also requesting a CT scan of her chest. She is chronically anticoagulated due to a history of PE. She has chronic shortness of breath that is stable at this time. She would like the CT scan checked for any changes since her PE given the chronic ongoing symptoms. She does continue to smoke a few cigarettes per day. She uses albuterol as needed which is helpful.      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     Adrenal mass, left (H)     BMI 50.0-59.9, adult (H)     Vitamin D deficiency     Nonintractable epilepsy without status epilepticus (H)     Esophageal reflux     Mixed hyperlipidemia     HLD (hyperlipidemia)     Hypothyroidism     Tobacco abuse     Osteopenia     Chronic obstructive pulmonary disease (H)     Schizoaffective disorder, depressive type (H)     History of COVID-19     DNR (do not resuscitate)     Migraine headache     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 F/up CT of the abdomen done on 6/26/12 showed a stable 5 mm nodular opacity in the right lower lung lobe, adjacent to the diagram. 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 According to the patient, the tumor was benign. She is not sure whether or not the tumor belonged to the thyroid gland. The surgery was done here at the [facility]. In January 2011, she was diagnosed with kidney stones. She was found to have an elevated calcium level of 11.7, with a PTH level of 368. Stone an     Primary hyperparathyroidism (H)            PTSD (post-traumatic stress disorder)      Pulmonary embolism with infarction (H) 1/12/2021     Recurrent otitis media      Seizure disorder (H)      Stroke (H)      Vertigo      Past Surgical History:   Procedure Laterality Date     ABDOMEN SURGERY       ADRENALECTOMY Left      BRAIN SURGERY       CRANIOTOMY FOR TEMPORAL LOBECTOMY Right     x3 for seizure     DILATION AND CURETTAGE       HEAD & NECK SURGERY       LITHOTRIPSY       PARATHYROID GLAND SURGERY      resection of adenoma     REFRACTIVE SURGERY Bilateral      RELEASE CARPAL TUNNEL Bilateral      TONSILLECTOMY & ADENOIDECTOMY       TOTAL THYROIDECTOMY       TOTAL VAGINAL HYSTERECTOMY      39 years old. Benign       Review of System: Relevant items noted in HPI. ROS otherwise negative.     Objective:     Vitals:    08/20/21 1423   BP: 110/82   BP Location: Left arm   Patient Position: Sitting   Cuff Size: Adult Large   Pulse: 84   Resp: 12   Temp:  "98.9  F (37.2  C)   TempSrc: Oral   Weight: (!) 158.9 kg (350 lb 6 oz)   Height: 1.689 m (5' 6.5\")        Physical Exam  Constitutional:       General: She is not in acute distress.     Appearance: She is not ill-appearing.   Cardiovascular:      Rate and Rhythm: Normal rate and regular rhythm.      Pulses:           Dorsalis pedis pulses are 2+ on the right side and 2+ on the left side.      Comments: No skin changes suggestive of venous stasis or arterial insufficiency.   Pulmonary:      Effort: Pulmonary effort is normal.      Breath sounds: Normal breath sounds. No wheezing or rhonchi.   Musculoskeletal:      Right lower leg: No edema.      Left lower leg: No edema.   Neurological:      Comments: Decreased sensation to monofillament testing over feet and hands symmetrically.         "

## 2021-08-20 NOTE — LETTER
2021      Valentina Olmedo  [address]   [facility] [address]        Dear ,    We are writing to inform you of your test results.    Your test results fall within the expected range(s) or remain unchanged from previous results.  Please continue with current treatment plan.    Resulted Orders   Renal panel   Result Value Ref Range    Sodium 139 136 - 145 mmol/L    Potassium 3.4 (L) 3.5 - 5.0 mmol/L    Chloride 101 98 - 107 mmol/L    Carbon Dioxide (CO2) 25 22 - 31 mmol/L    Anion Gap 13 5 - 18 mmol/L    Urea Nitrogen 14 8 - 22 mg/dL    Creatinine 0.75 0.60 - 1.10 mg/dL    Calcium 9.2 8.5 - 10.5 mg/dL    Glucose 107 70 - 125 mg/dL    Albumin 3.6 3.5 - 5.0 g/dL    Phosphorus 2.6 2.5 - 4.5 mg/dL    GFR Estimate 87 >60 mL/min/1.73m2      Comment:      As of 2021, eGFR is calculated by the CKD-EPI creatinine equation, without race adjustment. eGFR can be influenced by muscle mass, exercise, and diet. The reported eGFR is an estimation only and is only applicable if the renal function is stable.   TSH   Result Value Ref Range    TSH 0.53 0.30 - 5.00 uIU/mL   T4 free   Result Value Ref Range    Free T4 1.37 0.70 - 1.80 ng/dL      Comment:      Performance of the Free T4 test has not been established with  specimens (<= 2 months of age).     Vitamin B12   Result Value Ref Range    Vitamin B12 433 213 - 816 pg/mL   Magnesium   Result Value Ref Range    Magnesium 1.8 1.8 - 2.6 mg/dL   Methylmalonic Acid   Result Value Ref Range    Methylmalonic Acid 0.28 0.00 - 0.40 umol/L      Comment:      INTERPRETIVE INFORMATION:    Slight elevation 0.41-0.99 umol/L is consistent with mild vitamin B12 deficiency, renal insufficiency, or intravascular volume contraction.    Moderate elevation 1.00-9.99 umol/L is consistent with mild vitamin B12 deficiency.    Massive elevation 10.00 umol/L or greater is consistent with significant vitamin B12 deficiency or with inborn errors of metabolism.     Narrative    This test was developed and its performance characteristics determined by the [facility],  Special Chemistry Laboratory. It has not been cleared or approved by the FDA. The laboratory is regulated under CLIA as qualified to perform high-complexity testing. This test is used for clinical purposes. It should not be regarded as investigational or for research.   Hepatitis C antibody   Result Value Ref Range    Hepatitis C Antibody Negative Negative    Narrative    Assay performance characteristics have not been established for newborns, infants, children (<18 years) or populations of immunocompromised or immunosuppressed patients.    Vitamin B1 whole blood   Result Value Ref Range    Vitamin B1 Whole Blood Level 164 70 - 180 nmol/L      Comment:      INTERPRETIVE INFORMATION: Vitamin B1, Whole Blood    This assay measures the concentration of thiamine   diphosphate (TDP), the primary active form of vitamin B1.   Approximately 90 percent of vitamin B1 present in whole   blood is TDP. Thiamine and thiamine monophosphate, which   comprise the remaining 10 percent, are not measured.    This test was developed and its performance characteristics   determined by [facility]. It has not been cleared or   approved by the US Food and Drug Administration. This test   was performed in a CLIA certified laboratory and is   intended for clinical purposes.    Narrative    Performed By: [facility]  [address] [place] [id number]  : Pattie Morel MD       If you have any questions or concerns, please call the clinic at the number listed above.       Sincerely,      Promise Vanegas MD

## 2021-08-23 LAB — HCV AB SERPL QL IA: NEGATIVE

## 2021-08-27 LAB — VIT B1 PYROPHOSHATE BLD-SCNC: 164 NMOL/L

## 2021-08-28 ASSESSMENT — COLUMBIA-SUICIDE SEVERITY RATING SCALE - C-SSRS
1. IN THE PAST MONTH, HAVE YOU WISHED YOU WERE DEAD OR WISHED YOU COULD GO TO SLEEP AND NOT WAKE UP?: YES
6. HAVE YOU EVER DONE ANYTHING, STARTED TO DO ANYTHING, OR PREPARED TO DO ANYTHING TO END YOUR LIFE?: NO
TOTAL  NUMBER OF INTERRUPTED ATTEMPTS PAST 3 MONTHS: NO
5. HAVE YOU STARTED TO WORK OUT OR WORKED OUT THE DETAILS OF HOW TO KILL YOURSELF? DO YOU INTEND TO CARRY OUT THIS PLAN?: NO
TOTAL  NUMBER OF INTERRUPTED ATTEMPTS LIFETIME: NO
TOTAL  NUMBER OF ABORTED OR SELF INTERRUPTED ATTEMPTS PAST LIFETIME: NO
5. HAVE YOU STARTED TO WORK OUT OR WORKED OUT THE DETAILS OF HOW TO KILL YOURSELF? DO YOU INTEND TO CARRY OUT THIS PLAN?: NO
2. HAVE YOU ACTUALLY HAD ANY THOUGHTS OF KILLING YOURSELF?: NO
3. HAVE YOU BEEN THINKING ABOUT HOW YOU MIGHT KILL YOURSELF?: YES
ATTEMPT LIFETIME: NO
LETHALITY/MEDICAL DAMAGE CODE FIRST ACTUAL ATTEMPT: DEATH
2. HAVE YOU ACTUALLY HAD ANY THOUGHTS OF KILLING YOURSELF LIFETIME?: YES
REASONS FOR IDEATION PAST MONTH: EQUALLY TO GET ATTENTION, REVENGE OR A REACTION FROM OTHERS AND TO END/STOP THE PAIN
1. IN THE PAST MONTH, HAVE YOU WISHED YOU WERE DEAD OR WISHED YOU COULD GO TO SLEEP AND NOT WAKE UP?: NO
TOTAL  NUMBER OF ABORTED OR SELF INTERRUPTED ATTEMPTS PAST 3 MONTHS: NO
4. HAVE YOU HAD THESE THOUGHTS AND HAD SOME INTENTION OF ACTING ON THEM?: NO
ATTEMPT PAST THREE MONTHS: NO
6. HAVE YOU EVER DONE ANYTHING, STARTED TO DO ANYTHING, OR PREPARED TO DO ANYTHING TO END YOUR LIFE?: NO
REASONS FOR IDEATION LIFETIME: EQUALLY TO GET ATTENTION, REVENGE OR A REACTION FROM OTHERS AND TO END/STOP THE PAIN
4. HAVE YOU HAD THESE THOUGHTS AND HAD SOME INTENTION OF ACTING ON THEM?: NO

## 2021-08-29 ASSESSMENT — PATIENT HEALTH QUESTIONNAIRE - PHQ9
5. POOR APPETITE OR OVEREATING: MORE THAN HALF THE DAYS
SUM OF ALL RESPONSES TO PHQ QUESTIONS 1-9: 14

## 2021-08-29 ASSESSMENT — ANXIETY QUESTIONNAIRES
5. BEING SO RESTLESS THAT IT IS HARD TO SIT STILL: NOT AT ALL
1. FEELING NERVOUS, ANXIOUS, OR ON EDGE: MORE THAN HALF THE DAYS
7. FEELING AFRAID AS IF SOMETHING AWFUL MIGHT HAPPEN: SEVERAL DAYS
6. BECOMING EASILY ANNOYED OR IRRITABLE: MORE THAN HALF THE DAYS
3. WORRYING TOO MUCH ABOUT DIFFERENT THINGS: MORE THAN HALF THE DAYS
2. NOT BEING ABLE TO STOP OR CONTROL WORRYING: MORE THAN HALF THE DAYS
GAD7 TOTAL SCORE: 11
IF YOU CHECKED OFF ANY PROBLEMS ON THIS QUESTIONNAIRE, HOW DIFFICULT HAVE THESE PROBLEMS MADE IT FOR YOU TO DO YOUR WORK, TAKE CARE OF THINGS AT HOME, OR GET ALONG WITH OTHER PEOPLE: SOMEWHAT DIFFICULT

## 2021-08-30 ENCOUNTER — TELEPHONE (OUTPATIENT)
Dept: BEHAVIORAL HEALTH | Facility: CLINIC | Age: 61
End: 2021-08-30

## 2021-08-30 ENCOUNTER — MEDICAL CORRESPONDENCE (OUTPATIENT)
Dept: HEALTH INFORMATION MANAGEMENT | Facility: CLINIC | Age: 61
End: 2021-08-30

## 2021-08-30 ASSESSMENT — ANXIETY QUESTIONNAIRES: GAD7 TOTAL SCORE: 11

## 2021-08-30 NOTE — TELEPHONE ENCOUNTER
RECEIVED OCCUPATIONAL THERAPY ORDER FOR REVIEW AND SIGNATURE - PLACED IN PROVIDER'S CLINIC MAILBOX

## 2021-08-31 ENCOUNTER — HOSPITAL ENCOUNTER (OUTPATIENT)
Dept: CT IMAGING | Facility: HOSPITAL | Age: 61
Discharge: HOME | End: 2021-08-31
Attending: FAMILY MEDICINE | Admitting: FAMILY MEDICINE
Payer: MEDICARE

## 2021-08-31 ENCOUNTER — TELEPHONE (OUTPATIENT)
Dept: BEHAVIORAL HEALTH | Facility: CLINIC | Age: 61
End: 2021-08-31

## 2021-08-31 DIAGNOSIS — Z86.711 HISTORY OF PULMONARY EMBOLISM: ICD-10-CM

## 2021-08-31 DIAGNOSIS — R06.02 SHORTNESS OF BREATH: ICD-10-CM

## 2021-08-31 PROCEDURE — 250N000011 HC RX IP 250 OP 636: Performed by: FAMILY MEDICINE

## 2021-08-31 PROCEDURE — 71275 CT ANGIOGRAPHY CHEST: CPT | Mod: ME

## 2021-08-31 RX ORDER — IOPAMIDOL 755 MG/ML
100 INJECTION, SOLUTION INTRAVASCULAR ONCE
Status: COMPLETED | OUTPATIENT
Start: 2021-08-31 | End: 2021-08-31

## 2021-08-31 RX ADMIN — IOPAMIDOL 100 ML: 755 INJECTION, SOLUTION INTRAVENOUS at 15:10

## 2021-08-31 NOTE — TELEPHONE ENCOUNTER
2nd Request for physician signature on physician order    Last visit      7-13-21  Next visit     10-5-21    Fax labeled and put in providers clinic   mailbox    More paperwork received to review and sign off on

## 2021-09-01 ENCOUNTER — TELEPHONE (OUTPATIENT)
Dept: BEHAVIORAL HEALTH | Facility: CLINIC | Age: 61
End: 2021-09-01

## 2021-09-01 LAB — METHYLMALONATE SERPL-SCNC: 0.28 UMOL/L (ref 0–0.4)

## 2021-09-01 NOTE — TELEPHONE ENCOUNTER
RECEIVED P.T. RE-EVALUATION FOR REVIEW AND SIGNATURE - PLACED IN PROVIDER'S CLINIC MAILBOX    RECEIVED SECOND REQUEST

## 2021-09-02 DIAGNOSIS — Z53.9 DIAGNOSIS NOT YET DEFINED: Primary | ICD-10-CM

## 2021-09-02 PROCEDURE — G0179 MD RECERTIFICATION HHA PT: HCPCS | Performed by: FAMILY MEDICINE

## 2021-09-03 ENCOUNTER — ANTICOAGULATION THERAPY VISIT (OUTPATIENT)
Dept: ANTICOAGULATION | Facility: CLINIC | Age: 61
End: 2021-09-03

## 2021-09-03 ENCOUNTER — LAB (OUTPATIENT)
Dept: LAB | Facility: CLINIC | Age: 61
End: 2021-09-03
Payer: MEDICARE

## 2021-09-03 DIAGNOSIS — Z86.711 HISTORY OF PULMONARY EMBOLISM: Primary | ICD-10-CM

## 2021-09-03 DIAGNOSIS — Z79.01 LONG TERM (CURRENT) USE OF ANTICOAGULANTS: ICD-10-CM

## 2021-09-03 LAB — INR BLD: 1.6 (ref 0.9–1.1)

## 2021-09-03 PROCEDURE — 85610 PROTHROMBIN TIME: CPT

## 2021-09-03 NOTE — PROGRESS NOTES
ANTICOAGULATION MANAGEMENT     Valentina Olmedo 61 year old female is on warfarin with subtherapeutic INR result. (Goal INR 2.0-3.0)    Recent labs: (last 7 days)     09/03/21  1351   INR 1.6*       ASSESSMENT     Source(s): Chart Review and Patient/Caregiver Call       Warfarin doses taken: Missed dose(s) may be affecting INR    Diet: No new diet changes identified    New illness, injury, or hospitalization: No    Medication/supplement changes: None noted    Signs or symptoms of bleeding or clotting: No    Previous INR: Therapeutic last visit; previously outside of goal range    Additional findings: None     PLAN     Recommended plan for no diet, medication or health factor changes affecting INR     Dosing Instructions: Booster dose then continue your current warfarin dose with next INR in 2 weeks       Summary  As of 9/3/2021    Full warfarin instructions:  9/3: 15 mg; Otherwise 10 mg every Mon, Wed, Fri; 15 mg all other days   Next INR check:  9/17/2021             Telephone call with Valentina who verbalizes understanding and agrees to plan  Faxed to [phone]    Lab visit scheduled 9/17 at [facility]    Education provided: None required    Plan made per [facility] anticoagulation protocol    Gustavo Gamez, RAJEEV  Anticoagulation Clinic  9/3/2021    _______________________________________________________________________     Anticoagulation Episode Summary     Current INR goal:  2.0-3.0   TTR:  47.9 % (1 y)   Target end date:     [facility]    Indications    History of pulmonary embolism [Z86.711]           Comments:           Anticoagulation Care Providers     Provider Role Specialty Phone number    Donald Rooney MD Referring Internal Medicine [phone]    Promise Vanegas MD Responsible Family Medicine [phone]

## 2021-09-07 ENCOUNTER — TELEPHONE (OUTPATIENT)
Dept: BEHAVIORAL HEALTH | Facility: CLINIC | Age: 61
End: 2021-09-07

## 2021-09-10 ENCOUNTER — TELEPHONE (OUTPATIENT)
Dept: BEHAVIORAL HEALTH | Facility: CLINIC | Age: 61
End: 2021-09-10

## 2021-09-10 NOTE — TELEPHONE ENCOUNTER
Fax received from [facility] [employer]  On Valentina Olmedo.    Pt last had visit with you on 7-13-21 and future appt. Is scheduled for 10-5-21.    Forms labeled and put in providers clinic mail box to be signed and faxed back to [facility]

## 2021-09-14 NOTE — TELEPHONE ENCOUNTER
Provider reviewed and signed.        Writer faxed back to [facility] @ [phone]    Sent to scanning for adding into chart.

## 2021-09-14 NOTE — TELEPHONE ENCOUNTER
Provider reviewed and signed on pages related to orders needed.     Writer faxed back on behalf of provider.   Faxed back to [facility] @ [phone]      Sent to scanning..

## 2021-09-15 DIAGNOSIS — K59.09 CHRONIC CONSTIPATION: Primary | ICD-10-CM

## 2021-09-15 RX ORDER — SENNOSIDES 8.6 MG/1
TABLET, COATED ORAL
Qty: 360 TABLET | Refills: 3 | Status: SHIPPED | OUTPATIENT
Start: 2021-09-15 | End: 2022-10-03

## 2021-09-17 ENCOUNTER — ANTICOAGULATION THERAPY VISIT (OUTPATIENT)
Dept: ANTICOAGULATION | Facility: CLINIC | Age: 61
End: 2021-09-17

## 2021-09-17 ENCOUNTER — LAB (OUTPATIENT)
Dept: LAB | Facility: CLINIC | Age: 61
End: 2021-09-17
Payer: MEDICARE

## 2021-09-17 DIAGNOSIS — Z79.01 LONG TERM (CURRENT) USE OF ANTICOAGULANTS: ICD-10-CM

## 2021-09-17 DIAGNOSIS — Z86.711 HISTORY OF PULMONARY EMBOLISM: Primary | ICD-10-CM

## 2021-09-17 LAB — INR BLD: 3.1 (ref 0.9–1.1)

## 2021-09-17 PROCEDURE — 85610 PROTHROMBIN TIME: CPT

## 2021-09-17 NOTE — PROGRESS NOTES
ANTICOAGULATION MANAGEMENT     Valentina Olmedo 61 year old female is on warfarin with supratherapeutic INR result. (Goal INR 2.0-3.0)    Recent labs: (last 7 days)     09/17/21  1328   INR 3.1*       ASSESSMENT     Source(s): Chart Review and Patient/Caregiver Call       Warfarin doses taken: Warfarin taken as instructed    Diet: Decreased greens/vitamin K in diet; plans to resume previous intake    New illness, injury, or hospitalization: No    Medication/supplement changes: None noted    Signs or symptoms of bleeding or clotting: No    Previous INR: Subtherapeutic    Additional findings: None     faxed to [phone]     PLAN     Recommended plan for temporary change(s) affecting INR     Dosing Instructions: Continue your current warfarin dose with next INR in 2 weeks       Summary  As of 9/17/2021    Full warfarin instructions:  10 mg every Mon, Wed, Fri; 15 mg all other days   Next INR check:  10/1/2021             Telephone call with Valentina who verbalizes understanding and agrees to plan    Lab visit scheduled    Education provided: None required    Plan made per [facility] anticoagulation protocol    Gustavo Gamez RN  Anticoagulation Clinic  9/17/2021    _______________________________________________________________________     Anticoagulation Episode Summary     Current INR goal:  2.0-3.0   TTR:  50.4 % (1 y)   Target end date:     Send INR reminders to:  [facility]    Indications    History of pulmonary embolism [Z86.711]           Comments:           Anticoagulation Care Providers     Provider Role Specialty Phone number    Donald Rooney MD Referring Internal Medicine [phone]    Promise Vanegas MD Responsible Family Medicine [phone]

## 2021-09-21 ENCOUNTER — VIRTUAL VISIT (OUTPATIENT)
Dept: FAMILY MEDICINE | Facility: CLINIC | Age: 61
End: 2021-09-21
Payer: MEDICARE

## 2021-09-21 DIAGNOSIS — R05.9 COUGH: Primary | ICD-10-CM

## 2021-09-21 DIAGNOSIS — Z20.822 EXPOSURE TO COVID-19 VIRUS: ICD-10-CM

## 2021-09-21 PROCEDURE — 99213 OFFICE O/P EST LOW 20 MIN: CPT | Mod: 95 | Performed by: FAMILY MEDICINE

## 2021-09-21 NOTE — PATIENT INSTRUCTIONS
1. You will get a call to schedule for COVID-19 testing. You can also call to schedule: [phone].

## 2021-09-21 NOTE — PROGRESS NOTES
Valentina is a 61 year old who is being evaluated via a billable video visit.      How would you like to obtain your AVS? Mail a copy  If the video visit is dropped, the invitation should be resent by: Text to cell phone: [phone]  Will anyone else be joining your video visit? No    Problem List Items Addressed This Visit     None      Visit Diagnoses     Cough    -  Primary    Relevant Orders    Symptomatic COVID-19 Virus (Coronavirus) by PCR Nose    Exposure to COVID-19 virus        Relevant Orders    Symptomatic COVID-19 Virus (Coronavirus) by PCR Nose          Patient Instructions   1. You will get a call to schedule for COVID-19 testing. You can also call to schedule: [phone].           Subjective: Valentina is a 61 year old who is being evaluated via billable video visit.  The patient is requesting COVID testing. She reports her PCA was in contact with someone who has developed COVID. She most recently saw her PCA on 9/9/2021. Her PCA was then not able to come the following Monday because of a known exposure. The patient is not sure if the exposure was before or after the PCA saw her. The patient has been having a mild cough for about a week, she does have known seasonal allergies. No fever. No shortness of breath. She is fully vaccinated against COVID.    Objective:   Vitals: No vitals were obtained today due to virtual visit.  Patient is awake and alert in no apparent distress. No cough during visit. No evidence of respiratory distress.       Video-Visit Details    Type of service:  Video Visit    Video Start Time: 8:32 AM    Video End Time:8:40 AM    Originating Location (pt. Location): Home    Distant Location (provider location):  [facility]     Platform used for Video Visit: Well

## 2021-09-24 ENCOUNTER — DOCUMENTATION ONLY (OUTPATIENT)
Dept: ANTICOAGULATION | Facility: CLINIC | Age: 61
End: 2021-09-24

## 2021-09-24 DIAGNOSIS — N18.32 STAGE 3B CHRONIC KIDNEY DISEASE (H): ICD-10-CM

## 2021-09-24 DIAGNOSIS — M80.00XD AGE-RELATED OSTEOPOROSIS WITH CURRENT PATHOLOGICAL FRACTURE WITH ROUTINE HEALING, SUBSEQUENT ENCOUNTER: Primary | ICD-10-CM

## 2021-09-24 DIAGNOSIS — I26.99 PULMONARY EMBOLISM WITH INFARCTION (H): ICD-10-CM

## 2021-09-24 DIAGNOSIS — Z86.711 HISTORY OF PULMONARY EMBOLISM: Primary | ICD-10-CM

## 2021-09-24 DIAGNOSIS — M80.00XA AGE-RELATED OSTEOPOROSIS WITH CURRENT PATHOLOGICAL FRACTURE, INITIAL ENCOUNTER: ICD-10-CM

## 2021-09-24 NOTE — PROGRESS NOTES
ANTICOAGULATION MANAGEMENT      Valentina Olmedo due for annual renewal of referral to anticoagulation monitoring. Order pended for your review and signature.      ANTICOAGULATION SUMMARY      Warfarin indication(s)     PE    Heart valve present?  NO       Current goal range   INR: 2.0-3.0     Goal appropriate for indication? Yes, INR 2-3 appropriate for hx of DVT, PE, hypercoagulable state, Afib, LVAD, or bileaflet AVR without risk factors     Current duration of therapy Indefinite/long term therapy   Time in Therapeutic Range (TTR)  (Goal > 60%) 50.4%       Office visit with referring provider's group within last year yes on 8/20/21       Gustavo Gamez RN

## 2021-09-28 ENCOUNTER — MEDICAL CORRESPONDENCE (OUTPATIENT)
Dept: HEALTH INFORMATION MANAGEMENT | Facility: CLINIC | Age: 61
End: 2021-09-28

## 2021-10-01 ENCOUNTER — TELEPHONE (OUTPATIENT)
Dept: BEHAVIORAL HEALTH | Facility: CLINIC | Age: 61
End: 2021-10-01

## 2021-10-01 ENCOUNTER — ANTICOAGULATION THERAPY VISIT (OUTPATIENT)
Dept: ANTICOAGULATION | Facility: CLINIC | Age: 61
End: 2021-10-01

## 2021-10-01 ENCOUNTER — LAB (OUTPATIENT)
Dept: LAB | Facility: CLINIC | Age: 61
End: 2021-10-01
Payer: MEDICARE

## 2021-10-01 DIAGNOSIS — I26.99 PULMONARY EMBOLISM WITH INFARCTION (H): ICD-10-CM

## 2021-10-01 DIAGNOSIS — Z86.711 HISTORY OF PULMONARY EMBOLISM: Primary | ICD-10-CM

## 2021-10-01 LAB — INR BLD: 3.3 (ref 0.9–1.1)

## 2021-10-01 PROCEDURE — 85610 PROTHROMBIN TIME: CPT

## 2021-10-01 PROCEDURE — 36416 COLLJ CAPILLARY BLOOD SPEC: CPT

## 2021-10-01 NOTE — PROGRESS NOTES
ANTICOAGULATION MANAGEMENT     Valentina Olmedo 61 year old female is on warfarin with supratherapeutic INR result. (Goal INR 2.0-3.0)    Recent labs: (last 7 days)     10/01/21  1323   INR 3.3*       ASSESSMENT     Source(s): Chart Review and Patient/Caregiver Call       Warfarin doses taken: Warfarin taken as instructed    Diet: Decreased greens/vitamin K in diet; plans to resume previous intake     New illness, injury, or hospitalization: No    Medication/supplement changes: None noted    Signs or symptoms of bleeding or clotting: No    Previous INR: Supratherapeutic    Additional findings: None     PLAN     Recommended plan for temporary change(s) affecting INR     Dosing Instructions:  Decrease your warfarin dose (5.6% change) with next INR in 2 weeks       Summary  As of 10/1/2021    Full warfarin instructions:  15 mg every Sun, Tue, Thu; 10 mg all other days   Next INR check:  10/15/2021             Telephone call with Valentina who verbalizes understanding and agrees to plan  [facility] [phone]    Lab visit scheduled Oct 15    Education provided: None required    Plan made per [facility] anticoagulation protocol    Gustavo Gamez RN  Anticoagulation Clinic  10/1/2021    _______________________________________________________________________     Anticoagulation Episode Summary     Current INR goal:  2.0-3.0   TTR:  47.1 % (1 y)   Target end date:  Indefinite   Send INR reminders to:  [facility]    Indications    History of pulmonary embolism [Z86.711]  Pulmonary embolism with infarction (H) [I26.99]           Comments:           Anticoagulation Care Providers     Provider Role Specialty Phone number    Promise Vanegas MD Referring Family Medicine [phone]    Donald Rooney MD Referring Internal Medicine [phone]

## 2021-10-05 ENCOUNTER — OFFICE VISIT (OUTPATIENT)
Dept: BEHAVIORAL HEALTH | Facility: CLINIC | Age: 61
End: 2021-10-05
Payer: MEDICARE

## 2021-10-05 ENCOUNTER — OFFICE VISIT (OUTPATIENT)
Dept: FAMILY MEDICINE | Facility: CLINIC | Age: 61
End: 2021-10-05
Payer: MEDICARE

## 2021-10-05 VITALS
WEIGHT: 293 LBS | BODY MASS INDEX: 45.99 KG/M2 | HEIGHT: 67 IN | DIASTOLIC BLOOD PRESSURE: 85 MMHG | HEART RATE: 86 BPM | SYSTOLIC BLOOD PRESSURE: 140 MMHG

## 2021-10-05 VITALS
DIASTOLIC BLOOD PRESSURE: 80 MMHG | SYSTOLIC BLOOD PRESSURE: 110 MMHG | RESPIRATION RATE: 16 BRPM | HEIGHT: 67 IN | BODY MASS INDEX: 45.99 KG/M2 | WEIGHT: 293 LBS | HEART RATE: 88 BPM | TEMPERATURE: 98.3 F

## 2021-10-05 DIAGNOSIS — Z79.899 HIGH RISK MEDICATION USE: ICD-10-CM

## 2021-10-05 DIAGNOSIS — G40.909 NONINTRACTABLE EPILEPSY WITHOUT STATUS EPILEPTICUS, UNSPECIFIED EPILEPSY TYPE (H): ICD-10-CM

## 2021-10-05 DIAGNOSIS — F25.1 SCHIZOAFFECTIVE DISORDER, DEPRESSIVE TYPE (H): ICD-10-CM

## 2021-10-05 DIAGNOSIS — R63.5 WEIGHT GAIN: Primary | ICD-10-CM

## 2021-10-05 DIAGNOSIS — Z72.0 TOBACCO ABUSE: ICD-10-CM

## 2021-10-05 DIAGNOSIS — F41.9 ANXIETY: ICD-10-CM

## 2021-10-05 DIAGNOSIS — G47.33 OSA (OBSTRUCTIVE SLEEP APNEA): ICD-10-CM

## 2021-10-05 DIAGNOSIS — E03.9 ACQUIRED HYPOTHYROIDISM: ICD-10-CM

## 2021-10-05 PROCEDURE — 99215 OFFICE O/P EST HI 40 MIN: CPT | Performed by: PSYCHIATRY & NEUROLOGY

## 2021-10-05 PROCEDURE — G0463 HOSPITAL OUTPT CLINIC VISIT: HCPCS

## 2021-10-05 PROCEDURE — 99213 OFFICE O/P EST LOW 20 MIN: CPT | Performed by: FAMILY MEDICINE

## 2021-10-05 RX ORDER — HYDROXYZINE HYDROCHLORIDE 10 MG/1
10 TABLET, FILM COATED ORAL 2 TIMES DAILY PRN
Qty: 60 TABLET | Refills: 2 | Status: SHIPPED | OUTPATIENT
Start: 2021-10-05 | End: 2022-03-08

## 2021-10-05 RX ORDER — TOPIRAMATE 25 MG/1
25 TABLET, FILM COATED ORAL AT BEDTIME
Qty: 30 TABLET | Refills: 2 | Status: SHIPPED | OUTPATIENT
Start: 2021-10-05 | End: 2021-12-21

## 2021-10-05 ASSESSMENT — MIFFLIN-ST. JEOR
SCORE: 2202.41
SCORE: 2173.21

## 2021-10-05 NOTE — ASSESSMENT & PLAN NOTE
Patient's psychiatrist is recommending referral to weight loss specialist and [facility] pharmacist. Both referrals were placed.  
This level was just checked 8/20 and TSH was low normal, indicating adequate replacement. Recheck not indicated at this time.  
- - -

## 2021-10-05 NOTE — PROGRESS NOTES
Assessment/Plan:      Problem List Items Addressed This Visit        Medium    BMI 50.0-59.9, adult (H) - Primary     Patient's psychiatrist is recommending referral to weight loss specialist and MTM pharmacist. Both referrals were placed.         Relevant Orders    Comprehensive Weight Management    Med Therapy Management Referral    Hypothyroidism     This level was just checked 8/20 and TSH was low normal, indicating adequate replacement. Recheck not indicated at this time.             Patient Instructions   1. Please schedule for weight loss specialist and MTM pharmacist.       Subjective:   Valentina Olmedo is a 61 year old person who presents today with a couple of requests. She is wanting to loose weight. Her psychiatrist has recommended referrals to weight loss program and MTM pharmacist. Her psychiatrist also started her on Topamax today. She is also requesting follow up on her thyroid because she is concerned her thyroid disorder is contributing to her weight. Her TSH was actually checked 8/20 and was low normal, indicating appropriate replacement. The patient is reporting that she eats very little but continues to gain weight. She has seen a dietician in the past but reports that intervention alone was not helpful.    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     Adrenal mass, left (H)     BMI 50.0-59.9, adult (H)     Vitamin D deficiency     Nonintractable epilepsy without status epilepticus (H)     Esophageal reflux     Mixed hyperlipidemia     HLD (hyperlipidemia)     Hypothyroidism     Tobacco abuse     Osteopenia     Chronic obstructive pulmonary disease (H)     Schizoaffective disorder, depressive type (H)     History of COVID-19     DNR (do not resuscitate)      Migraine headache     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 F/up CT of the abdomen done on 6/26/12 showed a stable 5 mm nodular opacity in the right lower lung lobe, adjacent to the diagram. 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 According to the patient, the tumor was benign. She is not sure whether or not the tumor belonged to the thyroid gland. The surgery was done here at the [facility]. In January 2011, she was diagnosed with kidney stones. She was found to have an elevated calcium level of 11.7, with a PTH level of 368. Stone an     Primary hyperparathyroidism (H)            PTSD (post-traumatic stress disorder)      Pulmonary embolism with infarction (H) 1/12/2021     Recurrent otitis media      Seizure disorder (H)      Stroke (H)      Vertigo      Past Surgical History:   Procedure Laterality Date     ABDOMEN SURGERY       ADRENALECTOMY Left      BRAIN SURGERY       CRANIOTOMY FOR TEMPORAL LOBECTOMY Right     x3 for seizure      "DILATION AND CURETTAGE       HEAD & NECK SURGERY       LITHOTRIPSY       PARATHYROID GLAND SURGERY      resection of adenoma     REFRACTIVE SURGERY Bilateral      RELEASE CARPAL TUNNEL Bilateral      TONSILLECTOMY & ADENOIDECTOMY       TOTAL THYROIDECTOMY       TOTAL VAGINAL HYSTERECTOMY      39 years old. Benign       Review of System: Relevant items noted in HPI. ROS otherwise negative.     Objective:     Vitals:    10/05/21 1425   BP: 110/80   BP Location: Left arm   Patient Position: Sitting   Cuff Size: Adult Large   Pulse: 88   Resp: 16   Temp: 98.3  F (36.8  C)   TempSrc: Oral   Weight: (!) 160.8 kg (354 lb 7 oz)   Height: 1.689 m (5' 6.5\")        Physical Exam  Constitutional:       General: She is not in acute distress.     Appearance: She is not ill-appearing.   Neurological:      General: No focal deficit present.      Cranial Nerves: No cranial nerve deficit.   Psychiatric:         Mood and Affect: Mood normal.        "

## 2021-10-05 NOTE — PROGRESS NOTES
Correct pharmacy verified with patient and confirmed in snapshot? [x] yes []no    Charge captured ? [x] yes  [] no    Medications Phoned  to Pharmacy [] yes [x]no  Name of Pharmacist:  List Medications, including dose, quantity and instructions      Medication Prescriptions given to patient   [] yes  [x] no   List the name of the drug the prescription was written for.       Medications ordered this visit were e-scribed.  Verified by order class [x] yes  [] no   hydroxyzine HCl 10 mg; Topamax 25 mg  Medication changes or discontinuations were communicated to patient's pharmacy: [] yes  [x] no    UA collected [] yes  [x] no    [place] Prescription Monitoring Program Reviewed? [] yes  [x] no  Provider to confirm     Referrals were made to:  NA    Future appointment was made: [x] yes  [] no  01/11/2022  Dictation completed at time of chart check: [] yes  [x] no    I have checked the documentation for today s encounters and the above information has been reviewed and completed.

## 2021-10-05 NOTE — PROGRESS NOTES
Pt is here with spouse René for follow up.   Ok for René to remain during visit.     Discus score 0 due again 4/2022      Pt states melatonin 10 mg is not helping at night.  Pt reports she finds herself getting upset and angry more often these days.   Unhappy about the weight gain as well.         [place]  to be reviewed by provider.

## 2021-10-05 NOTE — TELEPHONE ENCOUNTER
Provider reviewed and signed.  Also included copy of Discus completed in office today as well.     Writer faxed back to [facility] fax #: [phone]   Confirmation Fax received successful transmission.    Updated labels to this visit today 10/05/2021. Sent items to scanning to become part of EMR.

## 2021-10-05 NOTE — PATIENT INSTRUCTIONS
Talk to your doctor about:    -Help with weight loss   -Bariatric referral  -MTM (medication therapy management) appointment pharmacist   -Topamax started today to help with weight loss

## 2021-10-05 NOTE — PROGRESS NOTES
"    Medical Decision Making:   Schizoaffective disorder, depressive type (H)  Denies depression.    Plan:   Continue Seroquel 200 mg nightly, Cymbalta 120 mg twice daily.  Pt wants to find therapy closer to home. Will discuss with Loretta.    Anxiety disorder  Requests something to help with occasional anxiety.  In the past, Hydroxyzine 25-50mg caused oversedation.  Plan:   Hydroxyzine 10 mg twice daily as needed anxiety     Nicotine dependence  Smokes 4-5 cigs/day. \"My neurologist told me not use patch\". Helped  Ayden quit 12 yrs ago.   Plan:   Working on cutting back.  NRT lozenge 2 mg as needed.      CHRIS (obstructive sleep apnea)  Using CPAP as best she can.  Uses a few hours at a time, can't tolerate mouth or nasal appliance.  C/o \"cannot breathe\" 2/2 allergies.  Plan: Pt to get new appliance.    Insomnia  Some nights gets 7 hours sleep, other nights may lay awake in bed until 4-5 AM.    Plan: Teaching done on sleep restriction and stimulus control. Pt to get out of bed when awake.    Obesity  C/o weight gain \"ever since I had thyroid removed\"  PCP manages endocrine/metabolic conditions.  Plan:   Pt encouraged to discuss bariatric referral and wt loss with PCP.  Discussed Metformin versus topiramate.  She would like to try topiramate.  Micromedex-drug interaction checked.  No interactions found between Topamax and scheduled meds.  Topamax 25 mg nightly to target appetite and weight.    High risk medication use   DISCUS score = 0    Epilepsy  Managed by Vikki Benitez MD, [place] [facility].      Return in about 3 mos.    Breana Puente MD  [facility]         History of Present Illness:   Valentina Olmedo is a 61 y.o. female here for SCAD, bipolar type, PTSD, Neurocognitive disorder, seizure disorder, here with  René.      Engaging in home PT/OT for strength/balance.      In-home services:  -PCA  -Med set up q Monday  -Food from [facility] [facility], " "[facility]    Weekly INR draw at clinic (3 min drive).      SOC: Chronic, René. Cat, \"Yosi\".       ROS:  Denies SI/manic/psychotic sx.  See HPI, otherwise negative.          Current Outpatient Medications:      albuterol (PROAIR HFA) 108 (90 Base) MCG/ACT inhaler, Inhale 2 puffs into the lungs every 4 hours as needed for shortness of breath / dyspnea or wheezing, Disp: 8 g, Rfl: 4     Bepotastine Besilate (BEPREVE) 1.5 % SOLN, Place 1 drop into both eyes 2 times daily, Disp: 10 mL, Rfl: 4     Brivaracetam (BRIVIACT) 100 MG tablet, Take 150 mg by mouth 2 times daily , Disp: , Rfl:      carBAMazepine (TEGRETOL) 200 MG tablet, Take 300-400 mg by mouth 300mg in AM, 300mg in afternoon and 300mg at PM., Disp: , Rfl:      cholecalciferol (VITAMIN D-3 SUPER STRENGTH) 2000 UNITS tablet, Take 2,000 Units by mouth, Disp: , Rfl:      DULoxetine (CYMBALTA) 60 MG capsule, Take 2 capsules (120 mg) by mouth daily, Disp: 180 capsule, Rfl: 1     fluticasone (FLONASE) 50 MCG/ACT nasal spray, Spray 1 spray in nostril daily, Disp: , Rfl:      hydrochlorothiazide (HYDRODIURIL) 25 MG tablet, Take 1 tablet (25 mg) by mouth daily, Disp: 90 tablet, Rfl: 3     hydrOXYzine (ATARAX) 10 MG tablet, Take 1 tablet (10 mg) by mouth 2 times daily as needed for anxiety, Disp: 60 tablet, Rfl: 2     levothyroxine (SYNTHROID/LEVOTHROID) 300 MCG tablet, Take 1 tablet (300 mcg) by mouth daily, Disp: 90 tablet, Rfl: 3     loratadine (CLARITIN) 10 MG tablet, TAKE 1 TABLET (10MG) BY MOUTH DAILY, Disp: 90 tablet, Rfl: 3     melatonin 10 MG TABS tablet, Take 1 tablet (10 mg) by mouth nightly as needed for sleep, Disp: 30 tablet, Rfl: 2     Nutritional Supplements (NUTRITIONAL SHAKE HIGH PROTEIN) LIQD, Take 1 each by mouth every morning Dispense Premier protein shakes. Replace breakfast with 1 shake daily., Disp: 9900 mL, Rfl: 11     olopatadine (PATADAY) 0.2 % ophthalmic solution, Place 1 drop into both eyes daily, Disp: , Rfl:      omeprazole " (PRILOSEC) 20 MG DR capsule, Take 20 mg by mouth 2 times daily , Disp: , Rfl:      oxybutynin (DITROPAN-XL) 10 MG 24 hr tablet, Take one tablet by mouth twice daily., Disp: , Rfl:      QUEtiapine (SEROQUEL) 200 MG tablet, Take 1 tablet (200 mg) by mouth At Bedtime, Disp: 90 tablet, Rfl: 1     rosuvastatin (CRESTOR) 20 MG tablet, Take 20 mg by mouth, Disp: , Rfl:      SENNA-TIME 8.6 MG tablet, TAKE 2 TABLETS (17.2MG) BY MOUTH TWICE A DAY, Disp: 360 tablet, Rfl: 3     topiramate (TOPAMAX) 25 MG tablet, Take 1 tablet (25 mg) by mouth At Bedtime (Patient not taking: Reported on 10/5/2021), Disp: 30 tablet, Rfl: 2     warfarin ANTICOAGULANT (COUMADIN) 5 MG tablet, Take 2 tablets (10mg) to 3 tablets (15mg) by mouth daily, as directed. Adjust dose based on INR results., Disp: 220 tablet, Rfl: 1     EPINEPHrine 0.15 MG/0.15ML SOAJ, Inject 1 each into the muscle , Disp: , Rfl:      nicotine (COMMIT) 2 MG lozenge, Place 1 lozenge (2 mg) inside cheek every hour as needed for smoking cessation (Patient not taking: Reported on 10/5/2021), Disp: 168 lozenge, Rfl: 1    Current Facility-Administered Medications:      [START ON 10/8/2021] denosumab (PROLIA) injection 60 mg, 60 mg, Subcutaneous, Q6 Months, Alice Rolle MD       MSE:      Vital signs: not currently breastfeeding.    Alert & oriented x 3.   Appearance: Neat.  Speech: Normal rate, rhythm and tone.  Gait: Not observed, in w/c  Musculoskeletal: No abnormal movements  Mood/Affect: Full range  Thought Process: Normal rate, logical.  Thought Content: No suicide or homicide ideation.  Associations: Intact, no delusions.  Perceptions: No hallucinations.  Memory: recent and remote memory intact, not formally tested  Attention span and concentration: normal.  Language: Intact.  Fund of Knowledge: Normal.  Insight and Judgement: Adequate.         Total time spent on this encounter, on the date of service, including pre-visit review of separately obtained history, face-to-face  interaction performing medically appropriate physical exam, patient counseling/education, interpretation of diagnostic results, care coordination and documentation was 50 minutes.            Breana Puente MD

## 2021-10-06 ENCOUNTER — TELEPHONE (OUTPATIENT)
Dept: BEHAVIORAL HEALTH | Facility: CLINIC | Age: 61
End: 2021-10-06

## 2021-10-07 ENCOUNTER — TELEPHONE (OUTPATIENT)
Dept: FAMILY MEDICINE | Facility: CLINIC | Age: 61
End: 2021-10-07

## 2021-10-07 NOTE — TELEPHONE ENCOUNTER
MTM referral from: [facility] visit (referral by provider)    MTM referral outreach attempt #2 on October 7, 2021 at 12:32 PM      Outcome: Patient not reachable after several attempts, will route to MTM Pharmacist/Provider as an FYI.  MT scheduling number is [phone].  Thank you for the referral.    Myke Maya, MTM coordinator

## 2021-10-11 ENCOUNTER — TELEPHONE (OUTPATIENT)
Dept: BEHAVIORAL HEALTH | Facility: CLINIC | Age: 61
End: 2021-10-11

## 2021-10-12 ENCOUNTER — TELEPHONE (OUTPATIENT)
Dept: BEHAVIORAL HEALTH | Facility: CLINIC | Age: 61
End: 2021-10-12

## 2021-10-12 ENCOUNTER — OFFICE VISIT (OUTPATIENT)
Dept: INTERNAL MEDICINE | Facility: CLINIC | Age: 61
End: 2021-10-12
Payer: MEDICARE

## 2021-10-12 ENCOUNTER — MEDICAL CORRESPONDENCE (OUTPATIENT)
Dept: HEALTH INFORMATION MANAGEMENT | Facility: CLINIC | Age: 61
End: 2021-10-12

## 2021-10-12 ENCOUNTER — MYC MEDICAL ADVICE (OUTPATIENT)
Dept: INTERNAL MEDICINE | Facility: CLINIC | Age: 61
End: 2021-10-12

## 2021-10-12 VITALS — OXYGEN SATURATION: 95 % | DIASTOLIC BLOOD PRESSURE: 79 MMHG | SYSTOLIC BLOOD PRESSURE: 122 MMHG | HEART RATE: 77 BPM

## 2021-10-12 DIAGNOSIS — K21.00 GASTROESOPHAGEAL REFLUX DISEASE WITH ESOPHAGITIS WITHOUT HEMORRHAGE: ICD-10-CM

## 2021-10-12 DIAGNOSIS — E03.9 ACQUIRED HYPOTHYROIDISM: ICD-10-CM

## 2021-10-12 DIAGNOSIS — F25.1 SCHIZOAFFECTIVE DISORDER, DEPRESSIVE TYPE (H): ICD-10-CM

## 2021-10-12 DIAGNOSIS — Z86.711 HISTORY OF PULMONARY EMBOLISM: ICD-10-CM

## 2021-10-12 DIAGNOSIS — M80.08XD FRACTURE OF VERTEBRA DUE TO OSTEOPOROSIS WITH ROUTINE HEALING, SUBSEQUENT ENCOUNTER: ICD-10-CM

## 2021-10-12 DIAGNOSIS — M81.8 OSTEOPOROSIS, IDIOPATHIC: ICD-10-CM

## 2021-10-12 DIAGNOSIS — Z72.0 TOBACCO ABUSE: ICD-10-CM

## 2021-10-12 DIAGNOSIS — J44.9 CHRONIC OBSTRUCTIVE PULMONARY DISEASE, UNSPECIFIED COPD TYPE (H): ICD-10-CM

## 2021-10-12 PROBLEM — R06.02 SHORTNESS OF BREATH: Status: RESOLVED | Noted: 2021-08-20 | Resolved: 2021-10-12

## 2021-10-12 LAB
CALCIUM, IONIZED MEASURED: 1.18 MMOL/L (ref 1.11–1.3)
ION CA PH 7.4: 1.16 MMOL/L (ref 1.11–1.3)
PH: 7.38 (ref 7.35–7.45)
PTH-INTACT SERPL-MCNC: 52 PG/ML (ref 10–86)
TSH SERPL DL<=0.005 MIU/L-ACNC: 0.23 UIU/ML (ref 0.3–5)

## 2021-10-12 PROCEDURE — 84443 ASSAY THYROID STIM HORMONE: CPT | Performed by: INTERNAL MEDICINE

## 2021-10-12 PROCEDURE — 83970 ASSAY OF PARATHORMONE: CPT | Performed by: INTERNAL MEDICINE

## 2021-10-12 PROCEDURE — 99214 OFFICE O/P EST MOD 30 MIN: CPT | Performed by: INTERNAL MEDICINE

## 2021-10-12 PROCEDURE — 82306 VITAMIN D 25 HYDROXY: CPT | Performed by: INTERNAL MEDICINE

## 2021-10-12 PROCEDURE — 36415 COLL VENOUS BLD VENIPUNCTURE: CPT | Performed by: INTERNAL MEDICINE

## 2021-10-12 PROCEDURE — 82330 ASSAY OF CALCIUM: CPT | Performed by: INTERNAL MEDICINE

## 2021-10-12 NOTE — TELEPHONE ENCOUNTER
"Patient has not scheduled since 8/18/21.    Writer spoke by phone with patient today to inquire if, as discsused last session, her  has been able to schedule her with a therapist who is closer to her home in [place].    Patient states \"Not yet\".  States also \"I don't really need a therapist right now.\"    Agreed she would be discharged from 's clinic at this time.    Patient agreed to follow up with [facility]  Jose Morfin ([phone]) if she does require psychotherapy care.   Jose was earlier informed of patient wish to find therapist closer to her home.    Note routed to patient's psychiatrist Dr. Breana Puente.      BELKIS Artis  "

## 2021-10-12 NOTE — PROGRESS NOTES
(M81.8) Osteoporosis, idiopathic  She had Prolia in October 2020, May 2021.  Plan: Ionized Calcium, Vitamin D Deficiency,         Parathyroid Hormone Intact, DX Hip/Pelvis/Spine            (K21.00) Gastroesophageal reflux disease with esophagitis without hemorrhage  Comment: stable on Prilosec.  Plan: Chronic use of PPI can contribute to bone density loss.    (J44.9) Chronic obstructive pulmonary disease, unspecified COPD type (H)  Comment: Not on steroid oral or inhaler.      (Z86.711) History of pulmonary embolism  Comment: On warfarin  Plan: Chronic use of blood thinners can contribute to bone density loss.    (E03.9) Acquired hypothyroidism  Comment: On levothyroxine, due for TSH check.  Plan: TSH        TSH should be 2-3. Hyperthyroidism can contribute to bone density loss.      (F25.1) Schizoaffective disorder, depressive type (H)  Comment: On Topamax.  Chronic use of Topamax can contribute to bone density loss.      (M80.08XD) Fracture of vertebra due to osteoporosis with routine healing, subsequent encounter  L1, L2, L5 compression fracture since Jan 2020.     (Z72.0) Tobacco abuse  Smoking cessation discussed. She cannot use the patch - has skin rash. She cannot use nicotine gum or lozenges too.    Patient was educated on safety of Prolia utilizing Patient Counseling Chart for Healthcare Providers, as outlined by the Prolia REMS progam.     Return in about 6 months (around 4/12/2022) for Prolia.    Patient Instructions   Prolia 3rd around Nov 10th.  Prolia 4th in 6 months with my nurse. I will see you in 1 year.    DXA - due now .   Phone number to schedule [phone].    Daily calcium need is 4356-1148 mg a day from the diet and supplements.  Calcium citrate is easier to digest.  Vitamin D 2000 IU daily recommended.          /79   Pulse 77   SpO2 95%       Did you experience any problems with previous Prolia injection? no  Any medication change in the last 6 months? no  Did you take prednisone  or other immunosupressant drugs in the last 6 months   (chemo, transplant, rheum, dermatology conditions)? no  Did you have any serious infection in the last 6 months?no  Any recent hospitalizations?no  Do you plan any dental work in the next 2-3 months?no  How much calcium do you take daily from the diet and supplements?1200 mg  How much vit D do you take daily? 2000 IU  Last DXA? 7/2020, Reviewed and discussed      Patient is here today for the 3rd Prolia injection. Patient tolerated previous injections well.   We discussed calcium and vit D daily needs today.   I reviewed the lab results:  Component      Latest Ref Rng & Units 8/20/2021   TSH      0.30 - 5.00 uIU/mL 0.53   T4 Free      0.70 - 1.80 ng/dL 1.37   Vitamin B12      213 - 816 pg/mL 433   Magnesium      1.8 - 2.6 mg/dL 1.8         Next Prolia injection will be in 6 months.           This note has been dictated using voice recognition software. 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     Adrenal mass, left (H)     BMI 50.0-59.9, adult (H)     Nonintractable epilepsy without status epilepticus (H)     Esophageal reflux     Mixed hyperlipidemia     HLD (hyperlipidemia)     Hypothyroidism     Tobacco abuse     Chronic obstructive pulmonary disease (H)     Schizoaffective disorder, depressive type (H)     History of COVID-19     DNR (do not resuscitate)     Migraine headache     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)     Fracture of vertebra due to osteoporosis with routine healing, subsequent encounter     Major depressive disorder, recurrent episode, moderate (H)     Venous stasis     Peripheral polyneuropathy     Pulmonary embolism with  infarction (H)     Osteoporosis, idiopathic       Current Outpatient Medications   Medication     albuterol (PROAIR HFA) 108 (90 Base) MCG/ACT inhaler     Brivaracetam (BRIVIACT) 100 MG tablet     carBAMazepine (TEGRETOL) 200 MG tablet     cholecalciferol (VITAMIN D-3 SUPER STRENGTH) 2000 UNITS tablet     DULoxetine (CYMBALTA) 60 MG capsule     EPINEPHrine 0.15 MG/0.15ML SOAJ     fluticasone (FLONASE) 50 MCG/ACT nasal spray     hydrochlorothiazide (HYDRODIURIL) 25 MG tablet     hydrOXYzine (ATARAX) 10 MG tablet     levothyroxine (SYNTHROID/LEVOTHROID) 300 MCG tablet     loratadine (CLARITIN) 10 MG tablet     melatonin 10 MG TABS tablet     Nutritional Supplements (NUTRITIONAL SHAKE HIGH PROTEIN) LIQD     olopatadine (PATADAY) 0.2 % ophthalmic solution     omeprazole (PRILOSEC) 20 MG DR capsule     oxybutynin (DITROPAN-XL) 10 MG 24 hr tablet     QUEtiapine (SEROQUEL) 200 MG tablet     rosuvastatin (CRESTOR) 20 MG tablet     SENNA-TIME 8.6 MG tablet     topiramate (TOPAMAX) 25 MG tablet     UNABLE TO FIND     warfarin ANTICOAGULANT (COUMADIN) 5 MG tablet     Current Facility-Administered Medications   Medication     denosumab (PROLIA) injection 60 mg

## 2021-10-12 NOTE — PATIENT INSTRUCTIONS
Prolia 3rd around Nov 10th.  Prolia 4th in 6 months with my nurse. I will see you in 1 year.    DXA - due now .   Phone number to schedule [phone].    Daily calcium need is 1489-5940 mg a day from the diet and supplements.  Calcium citrate is easier to digest.  Vitamin D 2000 IU daily recommended.

## 2021-10-13 DIAGNOSIS — E78.2 MIXED HYPERLIPIDEMIA: ICD-10-CM

## 2021-10-13 DIAGNOSIS — N32.81 OAB (OVERACTIVE BLADDER): Primary | ICD-10-CM

## 2021-10-13 LAB — DEPRECATED CALCIDIOL+CALCIFEROL SERPL-MC: 22 UG/L (ref 30–80)

## 2021-10-14 RX ORDER — OXYBUTYNIN CHLORIDE 10 MG/1
10 TABLET, EXTENDED RELEASE ORAL DAILY
Qty: 90 TABLET | Refills: 3 | Status: SHIPPED | OUTPATIENT
Start: 2021-10-14 | End: 2022-07-18

## 2021-10-14 RX ORDER — ROSUVASTATIN CALCIUM 20 MG/1
TABLET, COATED ORAL
Qty: 90 TABLET | Refills: 3 | Status: SHIPPED | OUTPATIENT
Start: 2021-10-14 | End: 2022-09-02

## 2021-10-14 NOTE — TELEPHONE ENCOUNTER
"  Disp Refills Start End ISABEL    rosuvastatin (CRESTOR) 20 MG tablet 90 tablet 3 10/30/2020  No   Sig - Route: Take 1 tablet (20 mg total) by mouth daily. - Oral   Sent to pharmacy as: rosuvastatin 20 mg tablet (CRESTOR)   Notes to Pharmacy: Refill Too Soon   E-Prescribing Status: Receipt confirmed by pharmacy (10/30/2020 12:55 PM CDT)       rosuvastatin (CRESTOR) 20 MG tablet [[id number]]    Electronically signed by: Donald Rooney MD on 10/30/20 1254 Status: Active   Ordering user: Donald Rooney MD 10/30/20 1254 Authorized by: Donald Rooney MD   Frequency: DAILY 10/30/20 - Until Discontinued Released by: Donald Rooney MD 10/30/20 1254   Diagnoses  Mixed hyperlipidemia [E78.2]   Medication comments: Refill Too Soon     Routing refill request to provider for review/approval because:  Labs not current:  LDL    Last Written Prescription Date:  10/30/20  Last Fill Quantity: 90,  # refills: 3   Last office visit provider:  10/12/21     Requested Prescriptions   Pending Prescriptions Disp Refills     rosuvastatin (CRESTOR) 20 MG tablet [Pharmacy Med Name: Rosuvastatin Calcium 20MG TABS] 28 tablet      Sig: TAKE 1 TABLET BY MOUTH DAILY       Statins Protocol Failed - 10/13/2021  9:26 AM        Failed - LDL on file in past 12 months     Recent Labs   Lab Test 09/13/18  1451   *             Passed - No abnormal creatine kinase in past 12 months     No lab results found.             Passed - Recent (12 mo) or future (30 days) visit within the authorizing provider's specialty     Patient has had an office visit with the authorizing provider or a provider within the authorizing providers department within the previous 12 mos or has a future within next 30 days. See \"Patient Info\" tab in inbasket, or \"Choose Columns\" in Meds & Orders section of the refill encounter.              Passed - Medication is active on med list        Passed - Patient is age 18 or older        Passed - No active " "pregnancy on record        Passed - No positive pregnancy test in past 12 months         Signed Prescriptions Disp Refills    oxybutynin ER (DITROPAN-XL) 10 MG 24 hr tablet 90 tablet 3     Sig: Take 1 tablet (10 mg) by mouth daily       Muscarinic Antagonists (Urinary Incontinence Agents) Passed - 10/13/2021  9:26 AM        Passed - Recent (12 mo) or future (30 days) visit within the authorizing provider's specialty     Patient has had an office visit with the authorizing provider or a provider within the authorizing providers department within the previous 12 mos or has a future within next 30 days. See \"Patient Info\" tab in inbasket, or \"Choose Columns\" in Meds & Orders section of the refill encounter.              Passed - Medication is Oxybutynin and patient is 5 years of age or older        Passed - Patient does not have a diagnosis of glaucoma on the problem list     If glaucoma diagnosis is new, refer refill to physician.          Passed - Medication is active on med list        Passed - Patient is 18 years of age or older             Adalid Anderson RN 10/14/21 7:34 AM  "

## 2021-10-14 NOTE — TELEPHONE ENCOUNTER
"  Disp Refills Start End ISABEL    oxybutynin (DITROPAN XL) 10 MG ER tablet 90 tablet 3 10/30/2020  No   Sig - Route: Take 1 tablet (10 mg total) by mouth daily. - Oral   Sent to pharmacy as: oxybutynin chloride ER 10 mg tablet,extended release 24 hr (DITROPAN XL)   E-Prescribing Status: Receipt confirmed by pharmacy (10/30/2020 12:55 PM CDT)     Last Written Prescription Date:  10/30/20  Last Fill Quantity: 90,  # refills: 3   Last office visit provider:  10/12/21    Requested Prescriptions   Pending Prescriptions Disp Refills     rosuvastatin (CRESTOR) 20 MG tablet [Pharmacy Med Name: Rosuvastatin Calcium 20MG TABS] 28 tablet      Sig: TAKE 1 TABLET BY MOUTH DAILY       Statins Protocol Failed - 10/13/2021  9:26 AM        Failed - LDL on file in past 12 months     Recent Labs   Lab Test 09/13/18  1451   *             Passed - No abnormal creatine kinase in past 12 months     No lab results found.             Passed - Recent (12 mo) or future (30 days) visit within the authorizing provider's specialty     Patient has had an office visit with the authorizing provider or a provider within the authorizing providers department within the previous 12 mos or has a future within next 30 days. See \"Patient Info\" tab in inbasket, or \"Choose Columns\" in Meds & Orders section of the refill encounter.              Passed - Medication is active on med list        Passed - Patient is age 18 or older        Passed - No active pregnancy on record        Passed - No positive pregnancy test in past 12 months           oxybutynin ER (DITROPAN-XL) 10 MG 24 hr tablet [Pharmacy Med Name: Oxybutynin Chloride ER 10MG TB24] 28 tablet      Sig: TAKE 1 TABLET BY MOUTH DAILY       Muscarinic Antagonists (Urinary Incontinence Agents) Passed - 10/13/2021  9:26 AM        Passed - Recent (12 mo) or future (30 days) visit within the authorizing provider's specialty     Patient has had an office visit with the authorizing provider or a provider " "within the authorizing providers department within the previous 12 mos or has a future within next 30 days. See \"Patient Info\" tab in inbasket, or \"Choose Columns\" in Meds & Orders section of the refill encounter.              Passed - Medication is Oxybutynin and patient is 5 years of age or older        Passed - Patient does not have a diagnosis of glaucoma on the problem list     If glaucoma diagnosis is new, refer refill to physician.          Passed - Medication is active on med list        Passed - Patient is 18 years of age or older             Adalid Anderson RN 10/14/21 7:30 AM  "

## 2021-10-15 ENCOUNTER — ANTICOAGULATION THERAPY VISIT (OUTPATIENT)
Dept: ANTICOAGULATION | Facility: CLINIC | Age: 61
End: 2021-10-15

## 2021-10-15 ENCOUNTER — LAB (OUTPATIENT)
Dept: LAB | Facility: CLINIC | Age: 61
End: 2021-10-15
Payer: MEDICARE

## 2021-10-15 DIAGNOSIS — I26.99 PULMONARY EMBOLISM WITH INFARCTION (H): ICD-10-CM

## 2021-10-15 LAB — INR BLD: 1.4 (ref 0.9–1.1)

## 2021-10-15 PROCEDURE — 85610 PROTHROMBIN TIME: CPT

## 2021-10-15 PROCEDURE — 36416 COLLJ CAPILLARY BLOOD SPEC: CPT

## 2021-10-15 NOTE — PROGRESS NOTES
ANTICOAGULATION MANAGEMENT     Valentina Olmedo 61 year old female is on warfarin with subtherapeutic INR result. (Goal INR 2.0-3.0)    Recent labs: (last 7 days)     10/15/21  1332   INR 1.4*       ASSESSMENT     Source(s): Patient/Caregiver Call       Warfarin doses taken: Warfarin taken as instructed    Diet: Increased greens/vitamin K in diet; ongoing change,     New illness, injury, or hospitalization: No    Medication/supplement changes: None noted    Signs or symptoms of bleeding or clotting: No    Previous INR: Supratherapeutic    Additional findings: None     PLAN     Recommended plan for ongoing change(s) affecting INR     Dosing Instructions: Booster dose then Increase your warfarin dose (6% change) with next INR in 5-7 days       Summary  As of 10/15/2021    Full warfarin instructions:  10/15: 15 mg; Otherwise 10 mg every Mon, Wed, Fri; 15 mg all other days   Next INR check:               Telephone call with Valentina who verbalizes understanding and agrees to plan    Lab visit scheduled    Education provided: Importance of consistent vitamin K intake, Impact of vitamin K foods on INR, Vitamin K content of foods, Goal range and significance of current result, Importance of therapeutic range, Importance of following up at instructed interval, Monitoring for clotting signs and symptoms and When to seek medical attention/emergency care    Plan made per [facility] anticoagulation protocol    Sara Crow, RN  Anticoagulation Clinic  10/15/2021    _______________________________________________________________________     Anticoagulation Episode Summary     Current INR goal:  2.0-3.0   TTR:  45.3 % (1 y)   Target end date:  Indefinite   Send INR reminders to:  [facility]    Indications    History of pulmonary embolism [Z86.711]  Pulmonary embolism with infarction (H) [I26.99]           Comments:           Anticoagulation Care Providers     Provider Role Specialty Phone number    Promise Vanegas MD Referring  Family Medicine [phone]    Donald Rooney MD [facility] Internal Medicine [phone]

## 2021-10-18 ENCOUNTER — TELEPHONE (OUTPATIENT)
Dept: BEHAVIORAL HEALTH | Facility: CLINIC | Age: 61
End: 2021-10-18

## 2021-10-18 NOTE — TELEPHONE ENCOUNTER
Fax Received from [facility] needing a physician signature on physician's Order.    Last visit:  10-5-21  Next visit:  1-11-21    Fax labeled and put in physician clinic mailbox.

## 2021-10-19 ENCOUNTER — ANCILLARY PROCEDURE (OUTPATIENT)
Dept: BONE DENSITY | Facility: CLINIC | Age: 61
End: 2021-10-19
Attending: INTERNAL MEDICINE
Payer: MEDICARE

## 2021-10-19 DIAGNOSIS — M81.8 OSTEOPOROSIS, IDIOPATHIC: ICD-10-CM

## 2021-10-19 PROBLEM — F32.9 MAJOR DEPRESSION: Status: RESOLVED | Noted: 2018-09-13 | Resolved: 2019-12-18

## 2021-10-19 PROCEDURE — 77080 DXA BONE DENSITY AXIAL: CPT | Mod: TC | Performed by: RADIOLOGY

## 2021-10-21 ENCOUNTER — ANTICOAGULATION THERAPY VISIT (OUTPATIENT)
Dept: ANTICOAGULATION | Facility: CLINIC | Age: 61
End: 2021-10-21

## 2021-10-21 ENCOUNTER — LAB (OUTPATIENT)
Dept: LAB | Facility: CLINIC | Age: 61
End: 2021-10-21
Payer: MEDICARE

## 2021-10-21 ENCOUNTER — TRANSFERRED RECORDS (OUTPATIENT)
Dept: HEALTH INFORMATION MANAGEMENT | Facility: CLINIC | Age: 61
End: 2021-10-21

## 2021-10-21 DIAGNOSIS — I26.99 PULMONARY EMBOLISM WITH INFARCTION (H): ICD-10-CM

## 2021-10-21 DIAGNOSIS — Z86.711 HISTORY OF PULMONARY EMBOLISM: Primary | ICD-10-CM

## 2021-10-21 LAB — INR BLD: 2.8 (ref 0.9–1.1)

## 2021-10-21 PROCEDURE — 85610 PROTHROMBIN TIME: CPT | Performed by: FAMILY MEDICINE

## 2021-10-21 NOTE — PROGRESS NOTES
ANTICOAGULATION MANAGEMENT     Valentina Olmedo 61 year old female is on warfarin with therapeutic INR result. (Goal INR 2.0-3.0)    Recent labs: (last 7 days)     10/21/21  1319   INR 2.8*       ASSESSMENT     Source(s): Chart Review and Patient/Caregiver Call       Warfarin doses taken: Warfarin taken as instructed    Diet: No new diet changes identified    New illness, injury, or hospitalization: No    Medication/supplement changes: None noted    Signs or symptoms of bleeding or clotting: No    Previous INR: Subtherapeutic    Additional findings: None     PLAN     Recommended plan for no diet, medication or health factor changes affecting INR     Dosing Instructions: Continue your current warfarin dose with next INR in 1 week       Summary  As of 10/21/2021    Full warfarin instructions:  10 mg every Mon, Wed, Fri; 15 mg all other days   Next INR check:  10/26/2021             Telephone call with Valentina who verbalizes understanding and agrees to plan    Check at provider office visit 10/26  Faxed to [phone]    Education provided: None required    Plan made per [facility] anticoagulation protocol    Gustavo Gamez RN  Anticoagulation Clinic  10/21/2021    _______________________________________________________________________     Anticoagulation Episode Summary     Current INR goal:  2.0-3.0   TTR:  45.8 % (1 y)   Target end date:  Indefinite   Send INR reminders to:  [facility]    Indications    History of pulmonary embolism [Z86.711]  Pulmonary embolism with infarction (H) [I26.99]           Comments:           Anticoagulation Care Providers     Provider Role Specialty Phone number    Promise Vanegas MD Referring Family Medicine [phone]    Donald Rooney MD Referring Internal Medicine [phone]

## 2021-10-25 ENCOUNTER — COMMUNICATION - HEALTHEAST (OUTPATIENT)
Dept: BEHAVIORAL HEALTH | Facility: CLINIC | Age: 61
End: 2021-10-25
Payer: MEDICARE

## 2021-10-25 ENCOUNTER — MEDICAL CORRESPONDENCE (OUTPATIENT)
Dept: HEALTH INFORMATION MANAGEMENT | Facility: CLINIC | Age: 61
End: 2021-10-25
Payer: MEDICARE

## 2021-10-25 ENCOUNTER — TELEPHONE (OUTPATIENT)
Dept: BEHAVIORAL HEALTH | Facility: CLINIC | Age: 61
End: 2021-10-25

## 2021-10-25 NOTE — TELEPHONE ENCOUNTER
RECEIVED PHYSICIAN'S ORDER FOR SKILLED NURSING AND OCCUPATIONAL THERAPY HOME CARE SERVICES - PLACED IN PROVIDER'S CLINIC MAILBOX

## 2021-10-26 ENCOUNTER — OFFICE VISIT (OUTPATIENT)
Dept: FAMILY MEDICINE | Facility: CLINIC | Age: 61
End: 2021-10-26
Payer: MEDICARE

## 2021-10-26 ENCOUNTER — VIRTUAL VISIT (OUTPATIENT)
Dept: CARDIOLOGY | Facility: CLINIC | Age: 61
End: 2021-10-26
Payer: MEDICARE

## 2021-10-26 ENCOUNTER — ANTICOAGULATION THERAPY VISIT (OUTPATIENT)
Dept: ANTICOAGULATION | Facility: CLINIC | Age: 61
End: 2021-10-26

## 2021-10-26 VITALS
HEART RATE: 92 BPM | SYSTOLIC BLOOD PRESSURE: 126 MMHG | DIASTOLIC BLOOD PRESSURE: 86 MMHG | RESPIRATION RATE: 16 BRPM | HEIGHT: 67 IN | WEIGHT: 293 LBS | BODY MASS INDEX: 45.99 KG/M2 | TEMPERATURE: 97.9 F

## 2021-10-26 DIAGNOSIS — R53.81 PHYSICAL DECONDITIONING: ICD-10-CM

## 2021-10-26 DIAGNOSIS — Z86.711 HISTORY OF PULMONARY EMBOLISM: Primary | ICD-10-CM

## 2021-10-26 DIAGNOSIS — G89.29 CHRONIC LOW BACK PAIN WITHOUT SCIATICA, UNSPECIFIED BACK PAIN LATERALITY: Primary | ICD-10-CM

## 2021-10-26 DIAGNOSIS — I26.99 PULMONARY EMBOLISM WITH INFARCTION (H): ICD-10-CM

## 2021-10-26 DIAGNOSIS — M54.50 CHRONIC LOW BACK PAIN WITHOUT SCIATICA, UNSPECIFIED BACK PAIN LATERALITY: Primary | ICD-10-CM

## 2021-10-26 DIAGNOSIS — Z00.6 RESEARCH SUBJECT: Primary | ICD-10-CM

## 2021-10-26 LAB — INR BLD: 2.9 (ref 0.9–1.1)

## 2021-10-26 PROCEDURE — 99213 OFFICE O/P EST LOW 20 MIN: CPT | Mod: 25 | Performed by: FAMILY MEDICINE

## 2021-10-26 PROCEDURE — 0004A COVID-19,PF,PFIZER (12+ YRS): CPT | Performed by: FAMILY MEDICINE

## 2021-10-26 PROCEDURE — 90732 PPSV23 VACC 2 YRS+ SUBQ/IM: CPT | Performed by: FAMILY MEDICINE

## 2021-10-26 PROCEDURE — 85610 PROTHROMBIN TIME: CPT | Performed by: FAMILY MEDICINE

## 2021-10-26 PROCEDURE — 91300 COVID-19,PF,PFIZER (12+ YRS): CPT | Performed by: FAMILY MEDICINE

## 2021-10-26 PROCEDURE — 99207 PR NO CHARGE-RESEARCH SERVICE: CPT

## 2021-10-26 PROCEDURE — G0009 ADMIN PNEUMOCOCCAL VACCINE: HCPCS | Performed by: FAMILY MEDICINE

## 2021-10-26 PROCEDURE — 36416 COLLJ CAPILLARY BLOOD SPEC: CPT | Performed by: FAMILY MEDICINE

## 2021-10-26 ASSESSMENT — MIFFLIN-ST. JEOR: SCORE: 2210.35

## 2021-10-26 NOTE — TELEPHONE ENCOUNTER
Provider reviewed and signed.     Writer faxed back to [facility] at [phone] received confirmation fax successful transmission.     Sent to scanning to become part of chart.

## 2021-10-26 NOTE — TELEPHONE ENCOUNTER
Provider reviewed and signed.     Writer faxed back to [facility] at [phone] received confirmation fax successful transmission.     Sent to scanning to become part of chart.

## 2021-10-26 NOTE — PROGRESS NOTES
The [place] 4 Study: A double-blind randomized placebo controlled trial assessing the effects of inclisiran on clinical outcomes among people with atherosclerotic cardiovascular disease.    Subject was called today for a study prescreening visit. A brief overview of the protocol was provided. Criteria for study entry was reviewed with the subject. She reported a stroke as a child/infant which would not meet criteria for study entry. An explanation was provided to the participant and she verbalized understanding.     Plan: Prescheduled screening visit will be cancelled.    Angy Velasquez RN  Clinical Trials Nurse

## 2021-10-26 NOTE — PROGRESS NOTES
ANTICOAGULATION MANAGEMENT     Valentina Olmedo 61 year old female is on warfarin with therapeutic INR result. (Goal INR 2.0-3.0)    Recent labs: (last 7 days)     10/26/21  1127   INR 2.9*       ASSESSMENT     Source(s): Chart Review and Patient/Caregiver Call       Warfarin doses taken: Warfarin taken as instructed    Diet: No new diet changes identified    New illness, injury, or hospitalization: No    Medication/supplement changes: None noted    Signs or symptoms of bleeding or clotting: No    Previous INR: Therapeutic last 2(+) visits    Additional findings: None     PLAN     Recommended plan for no diet, medication or health factor changes affecting INR     Dosing Instructions: Continue your current warfarin dose with next INR in 2 weeks       Summary  As of 10/26/2021    Full warfarin instructions:  10 mg every Mon, Wed, Fri; 15 mg all other days   Next INR check:  11/9/2021             Telephone call with Valentina who verbalizes understanding and agrees to plan    Lab visit scheduled faxed to [phone]    Education provided: None required    Plan made per [facility] anticoagulation protocol    Gustavo Gamez RN  Anticoagulation Clinic  10/26/2021    _______________________________________________________________________     Anticoagulation Episode Summary     Current INR goal:  2.0-3.0   TTR:  47.2 % (1 y)   Target end date:  Indefinite   Send INR reminders to:  [facility]    Indications    History of pulmonary embolism [Z86.711]  Pulmonary embolism with infarction (H) [I26.99]           Comments:           Anticoagulation Care Providers     Provider Role Specialty Phone number    Promise Vanegas MD Referring Family Medicine [phone]    Donald Rooney MD Referring Internal Medicine [phone]

## 2021-10-26 NOTE — TELEPHONE ENCOUNTER
Provider reviewed and signed.     Writer faxed back to [facility] at [phone] received confirmation fax successful transmission.     Sent to scanning to become part of chart.

## 2021-10-26 NOTE — PROGRESS NOTES
Assessment/Plan:      Problem List Items Addressed This Visit     None      Visit Diagnoses     Chronic low back pain without sciatica, unspecified back pain laterality    -  Primary    Relevant Orders    Physical Therapy Referral    Physical deconditioning        Relevant Orders    Physical Therapy Referral        Discussed with patient that pneumonia shot is up to date according to her chart. She insists that dose was not given. We will give a dose today given this would be low risk if she did have vaccine in January. COVID booster was also given today.    Agree with physical therapy. Referral placed.      Subjective:   Valentina Olmedo is a 61 year old person who presents today requesting a referral for physical therapy. Her chiropractor has recommended pool therapy for her chronic low back pain and deconditioning.    The patient is also requesting a pneumonia vaccine. According to her chart we gave her a vaccine 1/2021 but she reports she didn't actually get the shot.    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     Adrenal mass, left (H)     BMI 50.0-59.9, adult (H)     Nonintractable epilepsy without status epilepticus (H)     Esophageal reflux     Mixed hyperlipidemia     HLD (hyperlipidemia)     Hypothyroidism     Tobacco abuse     Chronic obstructive pulmonary disease (H)     Schizoaffective disorder, depressive type (H)     History of COVID-19     DNR (do not resuscitate)     Migraine headache     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 F/up CT of the abdomen done on 6/26/12 showed a stable 5 mm nodular opacity in the right lower lung lobe, adjacent to the diagram. 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 According to the patient, the tumor was benign. She is not sure whether or not the tumor belonged to the thyroid gland. The surgery was done here at the [facility]. In January 2011, she was diagnosed with kidney stones. She was found to have an elevated calcium level of 11.7, with a PTH level of 368. Stone an     Primary hyperparathyroidism (H)            PTSD (post-traumatic stress disorder)      Pulmonary embolism with infarction (H) 1/12/2021     Recurrent otitis media      Seizure disorder (H)      Stroke (H)      Vertigo      Past Surgical History:   Procedure Laterality Date     ABDOMEN SURGERY       ADRENALECTOMY Left      BRAIN SURGERY       CRANIOTOMY FOR TEMPORAL LOBECTOMY Right     x3 for seizure     DILATION AND CURETTAGE       HEAD & NECK SURGERY       LITHOTRIPSY       PARATHYROID GLAND SURGERY      resection of adenoma     REFRACTIVE SURGERY Bilateral      RELEASE CARPAL TUNNEL Bilateral      TONSILLECTOMY & ADENOIDECTOMY       TOTAL THYROIDECTOMY       TOTAL VAGINAL HYSTERECTOMY      39  "years old. Benign       Review of System: Relevant items noted in HPI. ROS otherwise negative.     Objective:     Vitals:    10/26/21 1043   BP: 126/86   BP Location: Left arm   Patient Position: Sitting   Cuff Size: Adult Large   Pulse: 92   Resp: 16   Temp: 97.9  F (36.6  C)   TempSrc: Oral   Weight: (!) 161.6 kg (356 lb 3 oz)   Height: 1.689 m (5' 6.5\")        Physical Exam  Constitutional:       General: She is not in acute distress.     Appearance: She is not ill-appearing.        "

## 2021-10-26 NOTE — LETTER
10/26/2021    KIESHA MTZ MD  [address] [facility] [address]    RE: Valentina Olmedo       Dear Colleague,    I had the pleasure of seeing Valentina Olmedo in the [facility].    The [place] 4 Study: A double-blind randomized placebo controlled trial assessing the effects of inclisiran on clinical outcomes among people with atherosclerotic cardiovascular disease.    Subject was called today for a study prescreening visit. A brief overview of the protocol was provided. Criteria for study entry was reviewed with the subject. She reported a stroke as a child/infant which would not meet criteria for study entry. An explanation was provided to the participant and she verbalized understanding.     Plan: Prescheduled screening visit will be cancelled.    Angy Velasquez RN  Clinical Trials Nurse        Thank you for allowing me to participate in the care of your patient.      Sincerely,     Angy Velasquez RN     [facility]  cc:   No referring provider defined for this encounter.

## 2021-10-30 ENCOUNTER — HEALTH MAINTENANCE LETTER (OUTPATIENT)
Age: 61
End: 2021-10-30

## 2021-11-05 ENCOUNTER — MYC MEDICAL ADVICE (OUTPATIENT)
Dept: INTERNAL MEDICINE | Facility: CLINIC | Age: 61
End: 2021-11-05

## 2021-11-07 NOTE — TELEPHONE ENCOUNTER
Telephone Encounter by Damari Recio at 10/25/2021 12:10 PM     Author: Damari Recio Service: -- Author Type: --    Filed: 10/25/2021 12:13 PM Encounter Date: 10/25/2021 Status: Signed    : Damari Recio       RECEIVED PHYSICIAN'S ORDER FOR SKILLED NURSING AND OCCUPATIONAL THERAPY HOME CARE SERVICES - PLACED IN PROVIDER'S CLINIC MAILBOX

## 2021-11-08 ENCOUNTER — TELEPHONE (OUTPATIENT)
Dept: BEHAVIORAL HEALTH | Facility: CLINIC | Age: 61
End: 2021-11-08
Payer: MEDICARE

## 2021-11-08 DIAGNOSIS — M81.8 OSTEOPOROSIS, IDIOPATHIC: Primary | ICD-10-CM

## 2021-11-08 RX ORDER — CHOLECALCIFEROL (VITAMIN D3) 50 MCG
2 TABLET ORAL DAILY
Qty: 180 TABLET | Refills: 3 | Status: SHIPPED | OUTPATIENT
Start: 2021-11-08 | End: 2022-10-04

## 2021-11-08 NOTE — TELEPHONE ENCOUNTER
Per Dr. Rolle on 10/12 she should be taking double her dose of Vit D.  Pharm states rx needs to come from PCP  Please send in rx for Vit D 4000 units to [facility].  When able

## 2021-11-09 ENCOUNTER — TRANSFERRED RECORDS (OUTPATIENT)
Dept: HEALTH INFORMATION MANAGEMENT | Facility: CLINIC | Age: 61
End: 2021-11-09
Payer: MEDICARE

## 2021-11-09 DIAGNOSIS — G40.209 LOCALIZATION-RELATED FOCAL EPILEPSY WITH COMPLEX PARTIAL SEIZURES (H): Primary | ICD-10-CM

## 2021-11-10 ENCOUNTER — ANTICOAGULATION THERAPY VISIT (OUTPATIENT)
Dept: ANTICOAGULATION | Facility: CLINIC | Age: 61
End: 2021-11-10

## 2021-11-10 ENCOUNTER — LAB (OUTPATIENT)
Dept: LAB | Facility: CLINIC | Age: 61
End: 2021-11-10
Payer: MEDICARE

## 2021-11-10 ENCOUNTER — OFFICE VISIT (OUTPATIENT)
Dept: FAMILY MEDICINE | Facility: CLINIC | Age: 61
End: 2021-11-10
Payer: MEDICARE

## 2021-11-10 VITALS
DIASTOLIC BLOOD PRESSURE: 74 MMHG | TEMPERATURE: 98.2 F | OXYGEN SATURATION: 99 % | WEIGHT: 293 LBS | SYSTOLIC BLOOD PRESSURE: 126 MMHG | RESPIRATION RATE: 16 BRPM | HEART RATE: 64 BPM | HEIGHT: 67 IN | BODY MASS INDEX: 45.99 KG/M2

## 2021-11-10 DIAGNOSIS — G40.209 LOCALIZATION-RELATED FOCAL EPILEPSY WITH COMPLEX PARTIAL SEIZURES (H): ICD-10-CM

## 2021-11-10 DIAGNOSIS — I26.99 PULMONARY EMBOLISM WITH INFARCTION (H): ICD-10-CM

## 2021-11-10 DIAGNOSIS — Z86.711 HISTORY OF PULMONARY EMBOLISM: Primary | ICD-10-CM

## 2021-11-10 DIAGNOSIS — M94.0 COSTOCHONDRITIS: Primary | ICD-10-CM

## 2021-11-10 DIAGNOSIS — G40.209 COMPLEX PARTIAL SEIZURES WITH CONSCIOUSNESS IMPAIRED (H): Primary | ICD-10-CM

## 2021-11-10 LAB
CARBAMAZEPINE SERPL-MCNC: 8.6 UG/ML
INR BLD: 2.8 (ref 0.9–1.1)

## 2021-11-10 PROCEDURE — 80156 ASSAY CARBAMAZEPINE TOTAL: CPT

## 2021-11-10 PROCEDURE — 99213 OFFICE O/P EST LOW 20 MIN: CPT | Performed by: FAMILY MEDICINE

## 2021-11-10 PROCEDURE — 85610 PROTHROMBIN TIME: CPT

## 2021-11-10 PROCEDURE — 36416 COLLJ CAPILLARY BLOOD SPEC: CPT

## 2021-11-10 PROCEDURE — 36415 COLL VENOUS BLD VENIPUNCTURE: CPT

## 2021-11-10 ASSESSMENT — PAIN SCALES - GENERAL: PAINLEVEL: MODERATE PAIN (4)

## 2021-11-10 ASSESSMENT — MIFFLIN-ST. JEOR: SCORE: 2228.09

## 2021-11-10 NOTE — PROGRESS NOTES
"    Problem List Items Addressed This Visit        Musculoskeletal and Integumentary    Costochondritis - Primary     ER notes reviewed.  Home exercises and stretches demonstrated and encouraged.  Ensure she is taking at least 4 breaths/h deeply while awake to prevent pneumonia.  Patient cannot take NSAIDs secondary to other underlying conditions and medications.  Limited to no relief with oxycodone so will stop.  Will give trial of a muscle relaxant.  Side effects and precautions with muscle relaxants discussed.         Relevant Medications    tiZANidine (ZANAFLEX) 4 MG tablet        Return in about 3 months (around 2/10/2022) for Routine preventive, Hypertension, Hypothyoidism.    Subjective   Valentina is a 61 year old who presents for the following health issues  accompanied by her spouse.    Follow-up from ER from chest pain.  Chest pain started after chiropractor treatment to her upper shoulder area that caused the rib to move with radiating pain around to the front.  ER notes and tests were reviewed.  No pneumothorax, EKG was normal.  And patient was discharged with pain medication.  Of note she is on warfarin therapy and therefore cannot take NSAIDs.  The oxycodone did decrease the pain mildly but about the same as the Tylenol that she normally takes.  Also she states she does not want to be on any type of opioid as she is afraid of the medications.  However Tylenol is not fully cutting the pain especially at night when trying to sleep.  Denies any shortness of breath.  Denies any fevers or chills.  Denies any cough.       Review of Systems   All other systems reviewed and are negative.           Objective    /74 (BP Location: Left arm, Patient Position: Sitting, Cuff Size: Adult Large)   Pulse 64   Temp 98.2  F (36.8  C) (Oral)   Resp 16   Ht 1.689 m (5' 6.5\")   Wt (!) 163.3 kg (360 lb 1.6 oz)   LMP  (LMP Unknown)   SpO2 99%   Breastfeeding No   BMI 57.25 kg/m    Body mass index is 57.25 " kg/m .  Physical Exam  Vitals and nursing note reviewed.   Constitutional:       General: She is not in acute distress.     Appearance: Normal appearance. She is not ill-appearing.   HENT:      Head: Normocephalic and atraumatic.   Eyes:      Extraocular Movements: Extraocular movements intact.      Conjunctiva/sclera: Conjunctivae normal.   Pulmonary:      Effort: Pulmonary effort is normal.      Breath sounds: Normal breath sounds. No wheezing, rhonchi or rales.      Comments: Chest wall and upper  to palpation around T3 to include in the axilla area and in the costochondritic area.  No crepitus.  Flex range of motion of the shoulder.  Skin:     Capillary Refill: Capillary refill takes less than 2 seconds.   Neurological:      Mental Status: She is alert and oriented to person, place, and time.   Psychiatric:         Attention and Perception: Attention normal.         Mood and Affect: Mood normal.         Speech: Speech normal.         Thought Content: Thought content normal.

## 2021-11-10 NOTE — PROGRESS NOTES
ANTICOAGULATION MANAGEMENT     Valentina Olmedo 61 year old female is on warfarin with therapeutic INR result. (Goal INR 2.0-3.0)    Recent labs: (last 7 days)     11/10/21  1531   INR 2.8*       ASSESSMENT     Source(s): Chart Review and Patient/Caregiver Call       Warfarin doses taken: Warfarin taken as instructed    Diet: No new diet changes identified    New illness, injury, or hospitalization: No    Medication/supplement changes: None noted    Signs or symptoms of bleeding or clotting: No    Previous INR: Therapeutic last 2(+) visits    Additional findings: None     PLAN     Recommended plan for no diet, medication or health factor changes affecting INR     Dosing Instructions: Continue your current warfarin dose with next INR in 4 weeks       Summary  As of 11/10/2021    Full warfarin instructions:  10 mg every Mon, Wed, Fri; 15 mg all other days   Next INR check:  12/8/2021             Telephone call with Valentina who verbalizes understanding and agrees to plan    Lab visit scheduled 12/8  Faxed to [phone]      Education provided: None required    Plan made per [facility] anticoagulation protocol    Gustavo Gamez RN  Anticoagulation Clinic  11/10/2021    _______________________________________________________________________     Anticoagulation Episode Summary     Current INR goal:  2.0-3.0   TTR:  49.7 % (1 y)   Target end date:  Indefinite   Send INR reminders to:  [facility]    Indications    History of pulmonary embolism [Z86.711]  Pulmonary embolism with infarction (H) [I26.99]           Comments:           Anticoagulation Care Providers     Provider Role Specialty Phone number    Promise Vanegas MD Referring Family Medicine [phone]    Donald Rooney MD Referring Internal Medicine [phone]

## 2021-11-10 NOTE — ASSESSMENT & PLAN NOTE
ER notes reviewed.  Home exercises and stretches demonstrated and encouraged.  Ensure she is taking at least 4 breaths/h deeply while awake to prevent pneumonia.  Patient cannot take NSAIDs secondary to other underlying conditions and medications.  Limited to no relief with oxycodone so will stop.  Will give trial of a muscle relaxant.  Side effects and precautions with muscle relaxants discussed.

## 2021-11-12 ENCOUNTER — ALLIED HEALTH/NURSE VISIT (OUTPATIENT)
Dept: FAMILY MEDICINE | Facility: CLINIC | Age: 61
End: 2021-11-12
Payer: MEDICARE

## 2021-11-12 DIAGNOSIS — M81.8 OSTEOPOROSIS, IDIOPATHIC: Primary | ICD-10-CM

## 2021-11-12 PROCEDURE — 99207 PR NO CHARGE NURSE ONLY: CPT

## 2021-11-12 PROCEDURE — 96372 THER/PROPH/DIAG INJ SC/IM: CPT | Performed by: INTERNAL MEDICINE

## 2021-11-12 NOTE — PROGRESS NOTES
"Prolia Injection Phone Screen      Screening questions have been asked 2-3 days prior to administration visit for Prolia. If any questions are answered with \"Yes,\" this phone encounter were will routed to ordering provider for further evaluation.     1.  When was the last injection?  05/10/2021    2.  Has insurance for this injection been verified?  Yes    3.  Did you experience any new onset achiness or rashes that lasted for over a month with your previous Prolia injection?   No    4.  Do you have a fever over 101?F or a new deep cough that is unusual for you today? No    5.  Have you started any new medications in the last 6 months that you were told could affect your immune system? These may have been prescribed by oncologist, transplant, rheumatology, or dermatology.   No    6.  In the last 6 months have you have gastric bypass or parathyroid surgery?   No    7.  Do you plan dental work requiring drilling into the bone such as implants/extractions or oral surgery in the next 2-3 months?   No    8. Do you have new insurance since the last injection? No    9. Have you received the Covid-19 vaccine? Yes  If No - Proceed with Prolia injection  If Yes - Date of vaccination 10/26/2021 Will need to wait until 2 weeks after 2nd dose of Covid-19 vaccine before administering Prolia       Patient informed if symptoms discussed above present prior to their administration appointment, they are to notify clinic immediately.  Yes    Cyndee Carson            "

## 2021-11-15 ENCOUNTER — TELEPHONE (OUTPATIENT)
Dept: BEHAVIORAL HEALTH | Facility: CLINIC | Age: 61
End: 2021-11-15
Payer: MEDICARE

## 2021-11-15 NOTE — TELEPHONE ENCOUNTER
Fax received from [facility].    Home Health Cerfication and Plan of Care signature needed and faxed back.    Forms labeled and put in providers clinic mailbox    Last visit:  10-5-21  Next visit:  1-11-22

## 2021-11-16 DIAGNOSIS — Z53.9 DIAGNOSIS NOT YET DEFINED: Primary | ICD-10-CM

## 2021-11-16 PROCEDURE — G0180 MD CERTIFICATION HHA PATIENT: HCPCS | Performed by: PSYCHIATRY & NEUROLOGY

## 2021-11-22 ENCOUNTER — DOCUMENTATION ONLY (OUTPATIENT)
Dept: BEHAVIORAL HEALTH | Facility: CLINIC | Age: 61
End: 2021-11-22
Payer: MEDICARE
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
## 2021-12-08 ENCOUNTER — ANTICOAGULATION THERAPY VISIT (OUTPATIENT)
Dept: ANTICOAGULATION | Facility: CLINIC | Age: 61
End: 2021-12-08

## 2021-12-08 ENCOUNTER — LAB (OUTPATIENT)
Dept: LAB | Facility: CLINIC | Age: 61
End: 2021-12-08
Payer: MEDICARE

## 2021-12-08 DIAGNOSIS — Z86.711 HISTORY OF PULMONARY EMBOLISM: Primary | ICD-10-CM

## 2021-12-08 DIAGNOSIS — I26.99 PULMONARY EMBOLISM WITH INFARCTION (H): ICD-10-CM

## 2021-12-08 LAB — INR BLD: 5.8 (ref 0.9–1.1)

## 2021-12-08 PROCEDURE — 36416 COLLJ CAPILLARY BLOOD SPEC: CPT

## 2021-12-08 PROCEDURE — 85610 PROTHROMBIN TIME: CPT

## 2021-12-08 NOTE — PROGRESS NOTES
ANTICOAGULATION MANAGEMENT     Valentina Olmedo 61 year old female is on warfarin with supratherapeutic INR result. (Goal INR 2.0-3.0)    Recent labs: (last 7 days)     12/08/21  1517   INR 5.8*       ASSESSMENT     Source(s): Chart Review and Patient/Caregiver Call       Warfarin doses taken: Warfarin taken as instructed    Diet: No new diet changes identified    New illness, injury, or hospitalization: No    Medication/supplement changes: None noted    Signs or symptoms of bleeding or clotting: Yes: had a tattoo colored in on 12/2 which has intermittent bleeding. She does not think it's infected and will watch for that.  Will also let us know if bleeding persists with warfarin hold    Previous INR: Therapeutic last 2(+) visits    Additional findings: None     PLAN     Recommended plan for temporary change(s) affecting INR     Dosing Instructions: Hold 2 doses then continue your current warfarin dose with next INR in 2 days       Summary  As of 12/8/2021    Full warfarin instructions:  12/8: Hold; 12/9: Hold; Otherwise 10 mg every Mon, Wed, Fri; 15 mg all other days   Next INR check:               Telephone call with Valentina who verbalizes understanding and agrees to plan    Lab visit scheduled    Education provided: None required    Plan made per [facility] anticoagulation protocol    Gustavo Gamez RN  Anticoagulation Clinic  12/8/2021    _______________________________________________________________________     Anticoagulation Episode Summary     Current INR goal:  2.0-3.0   TTR:  49.0 % (1 y)   Target end date:  Indefinite   Send INR reminders to:  [facility]    Indications    History of pulmonary embolism [Z86.711]  Pulmonary embolism with infarction (H) [I26.99]           Comments:           Anticoagulation Care Providers     Provider Role Specialty Phone number    Promise Vanegas MD Referring Family Medicine [phone]    Donald Rooney MD Referring Internal Medicine [phone]

## 2021-12-09 ENCOUNTER — MYC MEDICAL ADVICE (OUTPATIENT)
Dept: FAMILY MEDICINE | Facility: CLINIC | Age: 61
End: 2021-12-09

## 2021-12-09 ENCOUNTER — ANCILLARY PROCEDURE (OUTPATIENT)
Dept: GENERAL RADIOLOGY | Facility: CLINIC | Age: 61
End: 2021-12-09
Attending: FAMILY MEDICINE
Payer: MEDICARE

## 2021-12-09 ENCOUNTER — OFFICE VISIT (OUTPATIENT)
Dept: FAMILY MEDICINE | Facility: CLINIC | Age: 61
End: 2021-12-09
Payer: MEDICARE

## 2021-12-09 VITALS
RESPIRATION RATE: 20 BRPM | DIASTOLIC BLOOD PRESSURE: 80 MMHG | WEIGHT: 293 LBS | TEMPERATURE: 98.2 F | OXYGEN SATURATION: 96 % | BODY MASS INDEX: 45.99 KG/M2 | SYSTOLIC BLOOD PRESSURE: 136 MMHG | HEIGHT: 67 IN | HEART RATE: 92 BPM

## 2021-12-09 DIAGNOSIS — J18.9 PNEUMONIA OF LEFT LOWER LOBE DUE TO INFECTIOUS ORGANISM: Primary | ICD-10-CM

## 2021-12-09 DIAGNOSIS — R05.3 CHRONIC COUGH: ICD-10-CM

## 2021-12-09 DIAGNOSIS — J44.9 CHRONIC OBSTRUCTIVE PULMONARY DISEASE, UNSPECIFIED COPD TYPE (H): Primary | ICD-10-CM

## 2021-12-09 DIAGNOSIS — R05.9 COUGH: Primary | ICD-10-CM

## 2021-12-09 DIAGNOSIS — R07.89 CHEST WALL PAIN: ICD-10-CM

## 2021-12-09 PROCEDURE — 71046 X-RAY EXAM CHEST 2 VIEWS: CPT | Mod: TC | Performed by: RADIOLOGY

## 2021-12-09 PROCEDURE — 99214 OFFICE O/P EST MOD 30 MIN: CPT | Performed by: FAMILY MEDICINE

## 2021-12-09 RX ORDER — AZITHROMYCIN 250 MG/1
TABLET, FILM COATED ORAL
Qty: 6 TABLET | Refills: 0 | Status: SHIPPED | OUTPATIENT
Start: 2021-12-09 | End: 2021-12-14

## 2021-12-09 ASSESSMENT — MIFFLIN-ST. JEOR: SCORE: 2220.27

## 2021-12-09 NOTE — PROGRESS NOTES
Assessment/Plan:      Problem List Items Addressed This Visit        Medium    Chronic obstructive pulmonary disease (H) - Primary     Smoking cessation recommended. Patient not interested at this time but she has been cutting back. Start salmeterol 1 puff twice daily. Continue albuterol as needed.         Relevant Medications    salmeterol (SEREVENT) 50 MCG/DOSE inhaler    Other Relevant Orders    XR Chest 2 Views      Other Visit Diagnoses     Chest wall pain        Suspect musculoskeletal pain. Physical therpy recommended. Mammogram normal 5/2021.    Relevant Orders    XR Chest 2 Views    Physical Therapy Referral    Chronic cough        Suspect related to COPD. Managment as above. CXR will also be checked today.    Relevant Medications    salmeterol (SEREVENT) 50 MCG/DOSE inhaler    Other Relevant Orders    XR Chest 2 Views        Patient Instructions   1. Start salmeterol 1 puff twice daily.  2. Continue albuterol as needed.  3. Physical therapy ordered.   4. We will notify you of chest xray results.  5. I would encourage you to stop smoking.         Subjective:   Valentina Olmedo is a 61 year old person who presents today complaining of left breast pain. This started after she saw the chiropractor in October or November. The pain is there all the time. No redness. No fever. She is taking tylenol for the pain but it hasn't been helpful.    The patient is also complaining of a cough. This has been ongoing for over a month. She reports the cough is persistent throughout the day. She does still smoke. She hasn't been taking her albuterol at all. She is not on any other COPD medications.     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     Adrenal mass, left (H)     BMI 50.0-59.9, adult  (H)     Nonintractable epilepsy without status epilepticus (H)     Esophageal reflux     Mixed hyperlipidemia     HLD (hyperlipidemia)     Hypothyroidism     Tobacco abuse     Chronic obstructive pulmonary disease (H)     Schizoaffective disorder, depressive type (H)     History of COVID-19     DNR (do not resuscitate)     Migraine headache     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 F/up CT of the abdomen done on 6/26/12 showed a stable 5 mm nodular opacity in the right lower lung lobe, adjacent to the diagram. 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 According to the patient, the tumor was benign. She is not sure whether or not the tumor belonged to the thyroid gland. The surgery was done here at the [facility]. In January 2011, she was diagnosed with kidney stones. She was found to have an elevated calcium level of 11.7, with a PTH level of 368. Stone an     Primary hyperparathyroidism (H)            PTSD  "(post-traumatic stress disorder)      Pulmonary embolism with infarction (H) 1/12/2021     Recurrent otitis media      Seizure disorder (H)      Stroke (H)      Vertigo      Past Surgical History:   Procedure Laterality Date     ABDOMEN SURGERY       ADRENALECTOMY Left      BRAIN SURGERY       CRANIOTOMY FOR TEMPORAL LOBECTOMY Right     x3 for seizure     DILATION AND CURETTAGE       HEAD & NECK SURGERY       LITHOTRIPSY       PARATHYROID GLAND SURGERY      resection of adenoma     REFRACTIVE SURGERY Bilateral      RELEASE CARPAL TUNNEL Bilateral      TONSILLECTOMY & ADENOIDECTOMY       TOTAL THYROIDECTOMY       TOTAL VAGINAL HYSTERECTOMY      39 years old. Benign       Review of System: Relevant items noted in HPI. ROS otherwise negative.     Objective:     Vitals:    12/09/21 1006   BP: 136/80   BP Location: Left arm   Patient Position: Sitting   Cuff Size: Adult Large   Pulse: 92   Resp: 20   Temp: 98.2  F (36.8  C)   TempSrc: Oral   SpO2: 96%   Weight: (!) 162.6 kg (358 lb 6 oz)   Height: 1.689 m (5' 6.5\")        Physical Exam  Constitutional:       General: She is not in acute distress.     Appearance: She is not ill-appearing.   Cardiovascular:      Rate and Rhythm: Normal rate and regular rhythm.   Pulmonary:      Effort: Pulmonary effort is normal.      Breath sounds: No wheezing or rhonchi.      Comments: Breath sounds distant.  Chest:   Breasts:      Right: Normal. No inverted nipple, mass or skin change.      Left: Normal. No inverted nipple, mass or skin change.       Musculoskeletal:      Left shoulder: Normal range of motion.      Comments: Patient has left chest wall pain with deep breaths, palpation, and movement of left arm.        "

## 2021-12-09 NOTE — ASSESSMENT & PLAN NOTE
Smoking cessation recommended. Patient not interested at this time but she has been cutting back. Start salmeterol 1 puff twice daily. Continue albuterol as needed.

## 2021-12-09 NOTE — PATIENT INSTRUCTIONS
1. Start salmeterol 1 puff twice daily.  2. Continue albuterol as needed.  3. Physical therapy ordered.   4. We will notify you of chest xray results.  5. I would encourage you to stop smoking.

## 2021-12-10 ENCOUNTER — ANTICOAGULATION THERAPY VISIT (OUTPATIENT)
Dept: ANTICOAGULATION | Facility: CLINIC | Age: 61
End: 2021-12-10

## 2021-12-10 ENCOUNTER — LAB (OUTPATIENT)
Dept: LAB | Facility: CLINIC | Age: 61
End: 2021-12-10
Payer: MEDICARE

## 2021-12-10 DIAGNOSIS — I26.99 PULMONARY EMBOLISM WITH INFARCTION (H): ICD-10-CM

## 2021-12-10 DIAGNOSIS — Z86.711 HISTORY OF PULMONARY EMBOLISM: Primary | ICD-10-CM

## 2021-12-10 LAB — INR BLD: 1.6 (ref 0.9–1.1)

## 2021-12-10 PROCEDURE — 36416 COLLJ CAPILLARY BLOOD SPEC: CPT

## 2021-12-10 PROCEDURE — 85610 PROTHROMBIN TIME: CPT

## 2021-12-10 NOTE — PROGRESS NOTES
ANTICOAGULATION MANAGEMENT     Valentina Olmedo 61 year old female is on warfarin with subtherapeutic INR result. (Goal INR 2.0-3.0)    Recent labs: (last 7 days)     12/10/21  1036   INR 1.6*       ASSESSMENT     Source(s): Chart Review and Patient/Caregiver Call       Warfarin doses taken: Warfarin recently held for high inr which may be affecting INR    Diet: No new diet changes identified    New illness, injury, or hospitalization: No    Medication/supplement changes: z pallavi started yesterday for pneumonia    Signs or symptoms of bleeding or clotting: No    Previous INR: Supratherapeutic    Additional findings: None     PLAN     Recommended plan for no diet, medication or health factor changes affecting INR     Dosing Instructions: Continue your current warfarin dose with next INR in 1 week       Summary  As of 12/10/2021    Full warfarin instructions:  10 mg every Mon, Wed, Fri; 15 mg all other days   Next INR check:  12/17/2021             Telephone call with Valentina who verbalizes understanding and agrees to plan    Lab visit scheduled    Education provided: None required    Plan made per [facility] anticoagulation protocol    Gustavo Gamez RN  Anticoagulation Clinic  12/10/2021    _______________________________________________________________________     Anticoagulation Episode Summary     Current INR goal:  2.0-3.0   TTR:  49.1 % (1 y)   Target end date:  Indefinite   Send INR reminders to:  [facility]    Indications    History of pulmonary embolism [Z86.711]  Pulmonary embolism with infarction (H) [I26.99]           Comments:           Anticoagulation Care Providers     Provider Role Specialty Phone number    Promise Vanegas MD Referring Family Medicine [phone]    Donald Rooney MD Referring Internal Medicine [phone]

## 2021-12-14 DIAGNOSIS — R63.5 WEIGHT GAIN: ICD-10-CM

## 2021-12-14 RX ORDER — TOPIRAMATE 25 MG/1
25 TABLET, FILM COATED ORAL AT BEDTIME
Qty: 30 TABLET | Refills: 2 | OUTPATIENT
Start: 2021-12-14

## 2021-12-14 NOTE — TELEPHONE ENCOUNTER
Date of Last Office Visit: 10/5/21  Date of Next Office Visit: 01/11/22  No shows since last visit: no  Cancellations since last visit: no    Medication requested: topiramate (TOPAMAX) 25 MG tablet Date last ordered: 10/5/21 Qty: 30 Refills: 2

## 2021-12-17 ENCOUNTER — ANTICOAGULATION THERAPY VISIT (OUTPATIENT)
Dept: ANTICOAGULATION | Facility: CLINIC | Age: 61
End: 2021-12-17

## 2021-12-17 ENCOUNTER — LAB (OUTPATIENT)
Dept: LAB | Facility: CLINIC | Age: 61
End: 2021-12-17
Payer: MEDICARE

## 2021-12-17 DIAGNOSIS — I26.99 PULMONARY EMBOLISM WITH INFARCTION (H): ICD-10-CM

## 2021-12-17 DIAGNOSIS — Z86.711 HISTORY OF PULMONARY EMBOLISM: Primary | ICD-10-CM

## 2021-12-17 LAB — INR BLD: 5 (ref 0.9–1.1)

## 2021-12-17 PROCEDURE — 85610 PROTHROMBIN TIME: CPT

## 2021-12-17 PROCEDURE — 36416 COLLJ CAPILLARY BLOOD SPEC: CPT

## 2021-12-17 NOTE — TELEPHONE ENCOUNTER
Reason for Call:  Medication or medication refill:    Do you use a [facility]?  Name of the pharmacy and phone number for the current request:     [facility]    Name of the medication requested: topiramate (TOPAMAX) 25 MG tablet    Other request: needs med refilled    Can we leave a detailed message on this number? Not Applicable    Phone number patient can be reached at: [phone]    Best Time: asap    Call taken on 12/17/2021 at 9:35 AM by Tanja Anand

## 2021-12-17 NOTE — PROGRESS NOTES
ANTICOAGULATION MANAGEMENT     Valentina Olmedo 61 year old female is on warfarin with supratherapeutic INR result. (Goal INR 2.0-3.0)    Recent labs: (last 7 days)     12/17/21  1317   INR 5.0*       ASSESSMENT     Source(s): Chart Review and Template       Warfarin doses taken: Warfarin taken as instructed    Diet: No new diet changes identified    New illness, injury, or hospitalization: Yes: pneumonia    Medication/supplement changes: augmentin 12/9-19    Signs or symptoms of bleeding or clotting: No    Previous INR: Subtherapeutic    Additional findings: None     PLAN     Recommended plan for temporary change(s) affecting INR     Dosing Instructions: Hold 2 doses then continue your current warfarin dose with next INR in 3 days       Summary  As of 12/17/2021    Full warfarin instructions:  12/17: Hold; 12/18: Hold; Otherwise 10 mg every Mon, Wed, Fri; 15 mg all other days   Next INR check:  12/20/2021             Detailed voice message left for Valentina with dosing instructions and follow up date.     Contact [phone] to schedule and with any changes, questions or concerns.     Education provided: Monitoring for bleeding signs and symptoms    Plan made per [facility] anticoagulation protocol    Gustavo Gamez RN  Anticoagulation Clinic  12/17/2021    _______________________________________________________________________     Anticoagulation Episode Summary     Current INR goal:  2.0-3.0   TTR:  49.7 % (1 y)   Target end date:  Indefinite   Send INR reminders to:  [facility]    Indications    History of pulmonary embolism [Z86.711]  Pulmonary embolism with infarction (H) [I26.99]           Comments:           Anticoagulation Care Providers     Provider Role Specialty Phone number    Promise Vanegas MD Referring Family Medicine [phone]    Donald Rooney MD Referring Internal Medicine [phone]

## 2021-12-20 ENCOUNTER — LAB (OUTPATIENT)
Dept: LAB | Facility: CLINIC | Age: 61
End: 2021-12-20
Payer: MEDICARE

## 2021-12-20 ENCOUNTER — ANTICOAGULATION THERAPY VISIT (OUTPATIENT)
Dept: ANTICOAGULATION | Facility: CLINIC | Age: 61
End: 2021-12-20

## 2021-12-20 DIAGNOSIS — Z86.711 HISTORY OF PULMONARY EMBOLISM: Primary | ICD-10-CM

## 2021-12-20 DIAGNOSIS — I26.99 PULMONARY EMBOLISM WITH INFARCTION (H): ICD-10-CM

## 2021-12-20 LAB — INR BLD: 4.3 (ref 0.9–1.1)

## 2021-12-20 PROCEDURE — 85610 PROTHROMBIN TIME: CPT

## 2021-12-20 PROCEDURE — 36416 COLLJ CAPILLARY BLOOD SPEC: CPT

## 2021-12-20 NOTE — TELEPHONE ENCOUNTER
Date of Last Office Visit: 10/5/2021  Date of Next Office Visit: 1/11/2022  No shows since last visit: none  Cancellations since last visit: none    Medication requested: Topiramate 25 mg tablet Date last ordered: 10/5/2021 Qty: 30 Refills: 2     Review of MN ?: n/a    Lapse in medication adherence greater than 5 days?: no  If yes, call patient and gather details: no  Medication refill request verified as identical to current order?: yes  Result of Last DAM, VPA, Li+ Level, CBC, or Carbamazepine Level (at or since last visit): N/A    Last visit treatment plan: Plan:   Pt encouraged to discuss bariatric referral and wt loss with PCP.  Discussed Metformin versus topiramate.  She would like to try topiramate.  Micromedex-drug interaction checked.  No interactions found between Topamax and scheduled meds.  Topamax 25 mg nightly to target appetite and weight    []Medication refilled per  Medication Refill in Ambulatory Care  policy.  [x]Medication unable to be refilled by RN due to criteria not met as indicated below:    []Eligibility - not seen in the last year   []Supervision - no future appointment   []Compliance - no shows, cancellations or lapse in therapy   []Verification - order discrepancy   []Controlled medication   [x]Medication not included in policy   []90-day supply request   []Other

## 2021-12-20 NOTE — PROGRESS NOTES
ANTICOAGULATION MANAGEMENT     Valentina Olmedo 61 year old female is on warfarin with supratherapeutic INR result. (Goal INR 2.0-3.0)    Recent labs: (last 7 days)     12/20/21  1300   INR 4.3*       ASSESSMENT     Source(s): Chart Review and Patient/Caregiver Call       Warfarin doses taken: Warfarin recently held for inr of 5.0 which may be affecting INR    Diet: No new diet changes identified    New illness, injury, or hospitalization: No    Medication/supplement changes: None noted    Signs or symptoms of bleeding or clotting: No    Previous INR: Supratherapeutic    Additional findings: tattoo is slow to heal, Valentina will watch for infection signs and call if they occur     PLAN     Recommended plan for no diet, medication or health factor changes affecting INR     Dosing Instructions: Hold dose then Decrease your warfarin dose (5.6% change) with next INR in 1 week       Summary  As of 12/20/2021    Full warfarin instructions:  12/20: Hold; Otherwise 15 mg every Mon, Wed, Fri; 10 mg all other days   Next INR check:  12/27/2021             Telephone call with Valentina who verbalizes understanding and agrees to plan  Left message for nurse Arlyn who sets up meds    Lab visit scheduled    Education provided: None required    Plan made per [facility] anticoagulation protocol    Gustavo Gamez RN  Anticoagulation Clinic  12/20/2021    _______________________________________________________________________     Anticoagulation Episode Summary     Current INR goal:  2.0-3.0   TTR:  49.7 % (1 y)   Target end date:  Indefinite   Send INR reminders to:  [facility]    Indications    History of pulmonary embolism [Z86.711]  Pulmonary embolism with infarction (H) [I26.99]           Comments:           Anticoagulation Care Providers     Provider Role Specialty Phone number    Promise Vanegas MD Referring Family Medicine [phone]    Donald Rooney MD Referring Internal Medicine [phone]

## 2021-12-21 RX ORDER — TOPIRAMATE 25 MG/1
25 TABLET, FILM COATED ORAL AT BEDTIME
Qty: 30 TABLET | Refills: 4 | Status: SHIPPED | OUTPATIENT
Start: 2021-12-21 | End: 2022-04-19

## 2021-12-27 ENCOUNTER — ANTICOAGULATION THERAPY VISIT (OUTPATIENT)
Dept: ANTICOAGULATION | Facility: CLINIC | Age: 61
End: 2021-12-27

## 2021-12-27 ENCOUNTER — LAB (OUTPATIENT)
Dept: LAB | Facility: CLINIC | Age: 61
End: 2021-12-27
Payer: MEDICARE

## 2021-12-27 DIAGNOSIS — I26.99 PULMONARY EMBOLISM WITH INFARCTION (H): ICD-10-CM

## 2021-12-27 DIAGNOSIS — Z86.711 HISTORY OF PULMONARY EMBOLISM: Primary | ICD-10-CM

## 2021-12-27 LAB — INR BLD: 4.6 (ref 0.9–1.1)

## 2021-12-27 PROCEDURE — 36416 COLLJ CAPILLARY BLOOD SPEC: CPT

## 2021-12-27 PROCEDURE — 85610 PROTHROMBIN TIME: CPT

## 2021-12-27 NOTE — PROGRESS NOTES
ANTICOAGULATION MANAGEMENT     Valentina Olmedo 61 year old female is on warfarin with supratherapeutic INR result. (Goal INR 2.0-3.0)    Recent labs: (last 7 days)     12/27/21  1318   INR 4.6*       ASSESSMENT     Source(s): Chart Review and Patient/Caregiver Call       Warfarin doses taken: Warfarin taken as instructed    Diet: low appetite may be affecting diet and INR    New illness, injury, or hospitalization: No    Medication/supplement changes: None noted    Signs or symptoms of bleeding or clotting: No    Previous INR: Supratherapeutic    Additional findings: None     PLAN     Recommended plan for no diet, medication or health factor changes affecting INR     Dosing Instructions: Hold 2 doses then Decrease your warfarin dose (11.8% change) with next INR in 2 weeks       Summary  As of 12/27/2021    Full warfarin instructions:  12/27: Hold; 12/28: Hold; Otherwise 15 mg every Wed; 10 mg all other days   Next INR check:  1/3/2022             Telephone call with Valentina who verbalizes understanding and agrees to plan  Left message for nurse Stoddard with plan    Lab visit scheduled    Education provided: None required    Plan made per [facility] anticoagulation protocol    Gustavo Gamez RN  Anticoagulation Clinic  12/27/2021    _______________________________________________________________________     Anticoagulation Episode Summary     Current INR goal:  2.0-3.0   TTR:  49.7 % (1 y)   Target end date:  Indefinite   Send INR reminders to:  [facility]    Indications    History of pulmonary embolism [Z86.711]  Pulmonary embolism with infarction (H) [I26.99]           Comments:           Anticoagulation Care Providers     Provider Role Specialty Phone number    Promise Vanegas MD Referring Family Medicine [phone]    Donald Rooney MD Referring Internal Medicine [phone]

## 2021-12-28 NOTE — ADDENDUM NOTE
Addended by: AMANDO LORD on: 12/28/2021 01:49 PM     Modules accepted: Level of Service, SmartSet

## 2022-01-03 ENCOUNTER — LAB (OUTPATIENT)
Dept: LAB | Facility: CLINIC | Age: 62
End: 2022-01-03
Payer: MEDICARE

## 2022-01-03 ENCOUNTER — ANTICOAGULATION THERAPY VISIT (OUTPATIENT)
Dept: ANTICOAGULATION | Facility: CLINIC | Age: 62
End: 2022-01-03

## 2022-01-03 DIAGNOSIS — I26.99 PULMONARY EMBOLISM WITH INFARCTION (H): ICD-10-CM

## 2022-01-03 DIAGNOSIS — Z86.711 HISTORY OF PULMONARY EMBOLISM: Primary | ICD-10-CM

## 2022-01-03 LAB — INR BLD: 3.1 (ref 0.9–1.1)

## 2022-01-03 PROCEDURE — 36416 COLLJ CAPILLARY BLOOD SPEC: CPT

## 2022-01-03 PROCEDURE — 85610 PROTHROMBIN TIME: CPT

## 2022-01-03 NOTE — PROGRESS NOTES
ANTICOAGULATION MANAGEMENT     Valentina Olmedo 61 year old female is on warfarin with supratherapeutic INR result. (Goal INR 2.0-3.0)    Recent labs: (last 7 days)     01/03/22  1339   INR 3.1*       ASSESSMENT     Source(s): Chart Review and Patient/Caregiver Call       Warfarin doses taken: Warfarin taken as instructed    Diet: No new diet changes identified    New illness, injury, or hospitalization: No    Medication/supplement changes: None noted    Signs or symptoms of bleeding or clotting: No    Previous INR: Therapeutic last 2(+) visits    Additional findings: None     PLAN     Recommended plan for no diet, medication or health factor changes affecting INR     Dosing Instructions:  Decrease your warfarin dose (13.3% change) with next INR in 2 weeks       Summary  As of 1/3/2022    Full warfarin instructions:  5 mg every Wed; 10 mg all other days   Next INR check:  1/17/2022             Telephone call with Valentina who verbalizes understanding and agrees to plan. Also left message for nurse Arlyn    Lab visit scheduled    Education provided: None required    Plan made per [facility] anticoagulation protocol    Gustavo Gamez RN  Anticoagulation Clinic  1/3/2022    _______________________________________________________________________     Anticoagulation Episode Summary     Current INR goal:  2.0-3.0   TTR:  49.1 % (1 y)   Target end date:  Indefinite   Send INR reminders to:  [facility]    Indications    History of pulmonary embolism [Z86.711]  Pulmonary embolism with infarction (H) [I26.99]           Comments:           Anticoagulation Care Providers     Provider Role Specialty Phone number    Promise Vanegas MD Referring Family Medicine [phone]    Donald Rooney MD Referring Internal Medicine [phone]

## 2022-01-04 ENCOUNTER — TELEPHONE (OUTPATIENT)
Dept: BEHAVIORAL HEALTH | Facility: CLINIC | Age: 62
End: 2022-01-04
Payer: MEDICARE

## 2022-01-04 DIAGNOSIS — Z53.9 DIAGNOSIS NOT YET DEFINED: Primary | ICD-10-CM

## 2022-01-04 PROCEDURE — G0179 MD RECERTIFICATION HHA PT: HCPCS | Performed by: PSYCHIATRY & NEUROLOGY

## 2022-01-04 NOTE — TELEPHONE ENCOUNTER
RECEIVED HOME HEALTH CERTIFICATION AND PLAN OF CARE - PATIENT DOES HAVE IN CLINIC APPT 1-11-22 - PLACED IN PROVIDER'S CLINIC MAILBOX

## 2022-01-05 DIAGNOSIS — F25.1 SCHIZOAFFECTIVE DISORDER, DEPRESSIVE TYPE (H): ICD-10-CM

## 2022-01-05 NOTE — TELEPHONE ENCOUNTER
Date of Last Office Visit: 10/5/21  Date of Next Office Visit: 1/11/22  No shows since last visit: 0  Cancellations since last visit: 0    Medication requested: Both Date last ordered: 7/13/21 Qty: 90 days Refills: 1     Review of [place] ?: NA      Lapse in medication adherence greater than 5 days?: No  If yes, call patient and gather details: NA  Medication refill request verified as identical to current order?: yes  Result of Last DAM, VPA, Li+ Level, CBC, or Carbamazepine Level (at or since last visit): N/A    Last visit treatment plan:     Medical Decision Making:   Schizoaffective disorder, depressive type (H)  Denies depression.    Plan:   Continue Seroquel 200 mg nightly, Cymbalta 120 mg twice daily.  Pt wants to find therapy closer to home. Will discuss with Loretta.         []Medication refilled per  Medication Refill in Ambulatory Care  policy.  [x]Medication unable to be refilled by RN due to criteria not met as indicated below:    []Eligibility - not seen in the last year   []Supervision - no future appointment   []Compliance - no shows, cancellations or lapse in therapy   []Verification - order discrepancy   []Controlled medication   [x]Medication not included in policy   [x]90-day supply request   [x]Other: LPN is processing request

## 2022-01-07 ENCOUNTER — LAB (OUTPATIENT)
Dept: FAMILY MEDICINE | Facility: CLINIC | Age: 62
End: 2022-01-07
Attending: FAMILY MEDICINE
Payer: MEDICARE

## 2022-01-07 DIAGNOSIS — R05.9 COUGH: ICD-10-CM

## 2022-01-07 PROCEDURE — U0005 INFEC AGEN DETEC AMPLI PROBE: HCPCS

## 2022-01-07 PROCEDURE — U0003 INFECTIOUS AGENT DETECTION BY NUCLEIC ACID (DNA OR RNA); SEVERE ACUTE RESPIRATORY SYNDROME CORONAVIRUS 2 (SARS-COV-2) (CORONAVIRUS DISEASE [COVID-19]), AMPLIFIED PROBE TECHNIQUE, MAKING USE OF HIGH THROUGHPUT TECHNOLOGIES AS DESCRIBED BY CMS-2020-01-R: HCPCS

## 2022-01-08 LAB — SARS-COV-2 RNA RESP QL NAA+PROBE: NEGATIVE

## 2022-01-11 RX ORDER — QUETIAPINE FUMARATE 200 MG/1
200 TABLET, FILM COATED ORAL AT BEDTIME
Qty: 90 TABLET | Refills: 1 | Status: SHIPPED | OUTPATIENT
Start: 2022-01-11 | End: 2022-06-28

## 2022-01-11 RX ORDER — DULOXETIN HYDROCHLORIDE 60 MG/1
120 CAPSULE, DELAYED RELEASE ORAL DAILY
Qty: 180 CAPSULE | Refills: 1 | Status: SHIPPED | OUTPATIENT
Start: 2022-01-11 | End: 2022-06-28

## 2022-01-13 ENCOUNTER — OFFICE VISIT (OUTPATIENT)
Dept: FAMILY MEDICINE | Facility: CLINIC | Age: 62
End: 2022-01-13
Payer: MEDICARE

## 2022-01-13 VITALS
HEART RATE: 98 BPM | WEIGHT: 293 LBS | OXYGEN SATURATION: 95 % | SYSTOLIC BLOOD PRESSURE: 138 MMHG | TEMPERATURE: 98.2 F | BODY MASS INDEX: 45.99 KG/M2 | RESPIRATION RATE: 18 BRPM | HEIGHT: 67 IN | DIASTOLIC BLOOD PRESSURE: 86 MMHG

## 2022-01-13 DIAGNOSIS — J44.9 CHRONIC OBSTRUCTIVE PULMONARY DISEASE, UNSPECIFIED COPD TYPE (H): Primary | ICD-10-CM

## 2022-01-13 DIAGNOSIS — J44.9 COPD (CHRONIC OBSTRUCTIVE PULMONARY DISEASE) (H): Primary | ICD-10-CM

## 2022-01-13 DIAGNOSIS — Z72.0 TOBACCO ABUSE: ICD-10-CM

## 2022-01-13 PROCEDURE — 99213 OFFICE O/P EST LOW 20 MIN: CPT | Performed by: FAMILY MEDICINE

## 2022-01-13 RX ORDER — POLYETHYLENE GLYCOL 1450
POWDER (GRAM) MISCELLANEOUS SEE ADMIN INSTRUCTIONS
COMMUNITY
End: 2024-04-17

## 2022-01-13 RX ORDER — IRON ASPGLY/C/B12/FA/CA-TH/SUC 70-150-1MG
1 TABLET ORAL DAILY
COMMUNITY

## 2022-01-13 ASSESSMENT — MIFFLIN-ST. JEOR: SCORE: 2237.16

## 2022-01-13 NOTE — PATIENT INSTRUCTIONS
1. Continue serevent 1 puff twice daily, loratadine daily, and flonase 1 spray each nostril daily.  2. Continue albuterol every 4 hours as needed.  3. Schedule for evaluation with pulminologist  4. I would recommend smoking cessation.

## 2022-01-13 NOTE — PROGRESS NOTES
Assessment/Plan:      Problem List Items Addressed This Visit        Medium    BMI 50.0-59.9, adult (H)    Tobacco abuse    Chronic obstructive pulmonary disease (H) - Primary     No fever at this time. She has recently been treated with antibiotics without resolution of symptoms. No evidence of respiratory distress during her visit. I would suspect her symptoms are related to COPD and ongoing tobacco use. She is already on Serevent and albuterol and she was instructed to continue those. She should also continue her loratadine and Flonase. Tobacco cessation was recommended. I am also recommending evaluation by pulmonology, referral placed.         Relevant Orders    Adult Pulmonary Medicine Referral        Patient Instructions   1. Continue serevent 1 puff twice daily, loratadine daily, and flonase 1 spray each nostril daily.  2. Continue albuterol every 4 hours as needed.  3. Schedule for evaluation with pulminologist  4. I would recommend smoking cessation.           Subjective:   Valentina Olmedo is a 61 year old person who presents today with persistent cough and sneezing. She had pneumonia in early December and has had some symptoms since that time. She is also having sneezing for the past few days. She has had headache for the past few days as well. No fever. The patient is a smoker. No ill exposures that she is aware of.     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     Adrenal mass, left (H)     BMI 50.0-59.9, adult (H)     Nonintractable epilepsy without status epilepticus (H)     Esophageal reflux     Mixed hyperlipidemia     HLD (hyperlipidemia)     Hypothyroidism     Tobacco abuse     Chronic obstructive pulmonary disease (H)     Schizoaffective disorder, depressive type  (H)     History of COVID-19     DNR (do not resuscitate)     Migraine headache     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 F/up CT of the abdomen done on 6/26/12 showed a stable 5 mm nodular opacity in the right lower lung lobe, adjacent to the diagram. 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 According to the patient, the tumor was benign. She is not sure whether or not the tumor belonged to the thyroid gland. [facility]. In January 2011, she was diagnosed with kidney stones. She was found to have an elevated calcium level of 11.7, with a PTH level of 368. Stone an     Primary hyperparathyroidism (H)            PTSD (post-traumatic stress disorder)      Pulmonary embolism with infarction (H) 1/12/2021     Recurrent otitis media      Seizure disorder (H)      Stroke (H)      Vertigo      Past Surgical History:   Procedure Laterality Date     ABDOMEN SURGERY       ADRENALECTOMY Left       "BRAIN SURGERY       CRANIOTOMY FOR TEMPORAL LOBECTOMY Right     x3 for seizure     DILATION AND CURETTAGE       HEAD & NECK SURGERY       LITHOTRIPSY       PARATHYROID GLAND SURGERY      resection of adenoma     REFRACTIVE SURGERY Bilateral      RELEASE CARPAL TUNNEL Bilateral      TONSILLECTOMY & ADENOIDECTOMY       TOTAL THYROIDECTOMY       TOTAL VAGINAL HYSTERECTOMY      39 years old. Benign       Review of System: Relevant items noted in HPI. ROS otherwise negative.     Objective:     Vitals:    01/13/22 1400   BP: 138/86   BP Location: Left arm   Patient Position: Sitting   Cuff Size: Adult Large   Pulse: 98   Resp: 18   Temp: 98.2  F (36.8  C)   TempSrc: Oral   SpO2: 95%   Weight: (!) 164.2 kg (362 lb 1.6 oz)   Height: 1.689 m (5' 6.5\")        Physical Exam  Constitutional:       General: She is not in acute distress.     Appearance: She is not ill-appearing.   Cardiovascular:      Rate and Rhythm: Regular rhythm.   Pulmonary:      Effort: Pulmonary effort is normal.      Breath sounds: Normal breath sounds. No wheezing or rhonchi.        "

## 2022-01-17 ENCOUNTER — ANTICOAGULATION THERAPY VISIT (OUTPATIENT)
Dept: ANTICOAGULATION | Facility: CLINIC | Age: 62
End: 2022-01-17

## 2022-01-17 ENCOUNTER — LAB (OUTPATIENT)
Dept: LAB | Facility: CLINIC | Age: 62
End: 2022-01-17
Payer: MEDICARE

## 2022-01-17 DIAGNOSIS — Z86.711 HISTORY OF PULMONARY EMBOLISM: Primary | ICD-10-CM

## 2022-01-17 DIAGNOSIS — I26.99 PULMONARY EMBOLISM WITH INFARCTION (H): ICD-10-CM

## 2022-01-17 LAB — INR BLD: 1.5 (ref 0.9–1.1)

## 2022-01-17 PROCEDURE — 36416 COLLJ CAPILLARY BLOOD SPEC: CPT

## 2022-01-17 PROCEDURE — 85610 PROTHROMBIN TIME: CPT

## 2022-01-17 NOTE — PROGRESS NOTES
ANTICOAGULATION MANAGEMENT     Valentina Olmedo 61 year old female is on warfarin with subtherapeutic INR result. (Goal INR 2.0-3.0)    Recent labs: (last 7 days)     01/17/22  1323   INR 1.5*       ASSESSMENT     Source(s): Chart Review, Patient/Caregiver Call and Template       Warfarin doses taken: Warfarin taken as instructed    Diet: Decreased greens/vitamin K in diet; plans to resume previous intake    Reported last week did have her salad 2x since she had high INR, however, in the last 5 days, has had no vitamin-K intake.    New illness, injury, or hospitalization: No   Reported persisting cough / sneezing, however, denied coughing up any blood.   Reported on 1/7/22 - Covid-19 test was not detected.    Had pneumonia in 12/9/21.    Medication/supplement changes: None noted    Signs or symptoms of bleeding or clotting: No    Previous INR: Supratherapeutic last 4 INR results.    Additional findings: None     Called Valentina's nurse - RAJEEV Stoddard - cell# [phone] with warfarin dosing and next INR check.     PLAN     Recommended plan for temporary change(s) affecting INR     Dosing Instructions:   (evenings. 5mg tabs)    Booster dose then continue your current warfarin dose with next INR in 1-2 weeks       Summary  As of 1/17/2022    Full warfarin instructions:  1/17: 15 mg; Otherwise 5 mg every Wed; 10 mg all other days   Next INR check:  1/31/2022             Telephone call with  Valentina ([phone]) who verbalizes understanding and agrees to plan    Lab visit scheduled - INR on 1/24/22 @ [facility].    Education provided: Importance of consistent vitamin K intake, Impact of vitamin K foods on INR and Goal range and significance of current result    Plan made per [facility] anticoagulation protocol    Shonna Ernandez RN  Anticoagulation Clinic  1/17/2022    _______________________________________________________________________     Anticoagulation Episode Summary     Current INR goal:  2.0-3.0   TTR:  48.0 % (1 y)   Target  end date:  Indefinite   Send INR reminders to:  [facility]    Indications    History of pulmonary embolism [Z86.711]  Pulmonary embolism with infarction (H) [I26.99]           Comments:           Anticoagulation Care Providers     Provider Role Specialty Phone number    Promise Vanegas MD Referring Family Medicine [phone]    Donald Rooney MD Referring Internal Medicine [phone]

## 2022-01-17 NOTE — ASSESSMENT & PLAN NOTE
No fever at this time. She has recently been treated with antibiotics without resolution of symptoms. No evidence of respiratory distress during her visit. I would suspect her symptoms are related to COPD and ongoing tobacco use. She is already on Serevent and albuterol and she was instructed to continue those. She should also continue her loratadine and Flonase. Tobacco cessation was recommended. I am also recommending evaluation by pulmonology, referral placed.

## 2022-01-18 ENCOUNTER — OFFICE VISIT (OUTPATIENT)
Dept: BEHAVIORAL HEALTH | Facility: CLINIC | Age: 62
End: 2022-01-18
Payer: MEDICARE

## 2022-01-18 VITALS
SYSTOLIC BLOOD PRESSURE: 122 MMHG | BODY MASS INDEX: 55.36 KG/M2 | HEIGHT: 67 IN | DIASTOLIC BLOOD PRESSURE: 75 MMHG | BODY MASS INDEX: 45.99 KG/M2 | TEMPERATURE: 98.9 F | TEMPERATURE: 98.1 F | BODY MASS INDEX: 45.99 KG/M2 | WEIGHT: 293 LBS | TEMPERATURE: 98.1 F | HEART RATE: 80 BPM | HEIGHT: 66 IN | SYSTOLIC BLOOD PRESSURE: 110 MMHG | RESPIRATION RATE: 20 BRPM | DIASTOLIC BLOOD PRESSURE: 80 MMHG | SYSTOLIC BLOOD PRESSURE: 138 MMHG | SYSTOLIC BLOOD PRESSURE: 120 MMHG | HEART RATE: 72 BPM | HEART RATE: 98 BPM | DIASTOLIC BLOOD PRESSURE: 82 MMHG | WEIGHT: 293 LBS | DIASTOLIC BLOOD PRESSURE: 82 MMHG | SYSTOLIC BLOOD PRESSURE: 124 MMHG | DIASTOLIC BLOOD PRESSURE: 84 MMHG | HEART RATE: 96 BPM | HEART RATE: 84 BPM | BODY MASS INDEX: 45.99 KG/M2 | TEMPERATURE: 97.8 F | SYSTOLIC BLOOD PRESSURE: 136 MMHG | HEART RATE: 89 BPM | HEIGHT: 67 IN | SYSTOLIC BLOOD PRESSURE: 122 MMHG | OXYGEN SATURATION: 98 % | HEIGHT: 67 IN | RESPIRATION RATE: 16 BRPM | RESPIRATION RATE: 16 BRPM | RESPIRATION RATE: 16 BRPM | DIASTOLIC BLOOD PRESSURE: 86 MMHG | HEART RATE: 72 BPM | WEIGHT: 293 LBS | HEIGHT: 67 IN | WEIGHT: 293 LBS | HEART RATE: 76 BPM | TEMPERATURE: 98.6 F | HEIGHT: 66 IN | SYSTOLIC BLOOD PRESSURE: 124 MMHG | RESPIRATION RATE: 16 BRPM | DIASTOLIC BLOOD PRESSURE: 80 MMHG | BODY MASS INDEX: 45.99 KG/M2 | WEIGHT: 293 LBS | DIASTOLIC BLOOD PRESSURE: 80 MMHG | BODY MASS INDEX: 47.09 KG/M2 | OXYGEN SATURATION: 95 %

## 2022-01-18 VITALS
DIASTOLIC BLOOD PRESSURE: 89 MMHG | WEIGHT: 293 LBS | HEIGHT: 66 IN | BODY MASS INDEX: 47.09 KG/M2 | SYSTOLIC BLOOD PRESSURE: 138 MMHG | OXYGEN SATURATION: 98 % | HEART RATE: 100 BPM

## 2022-01-18 VITALS — DIASTOLIC BLOOD PRESSURE: 82 MMHG | HEART RATE: 86 BPM | SYSTOLIC BLOOD PRESSURE: 118 MMHG

## 2022-01-18 VITALS
DIASTOLIC BLOOD PRESSURE: 74 MMHG | BODY MASS INDEX: 47.09 KG/M2 | OXYGEN SATURATION: 97 % | SYSTOLIC BLOOD PRESSURE: 134 MMHG | HEART RATE: 110 BPM | WEIGHT: 293 LBS | HEIGHT: 66 IN

## 2022-01-18 VITALS — OXYGEN SATURATION: 96 % | SYSTOLIC BLOOD PRESSURE: 110 MMHG | DIASTOLIC BLOOD PRESSURE: 62 MMHG | HEART RATE: 101 BPM

## 2022-01-18 VITALS — DIASTOLIC BLOOD PRESSURE: 80 MMHG | OXYGEN SATURATION: 95 % | HEART RATE: 100 BPM | SYSTOLIC BLOOD PRESSURE: 130 MMHG

## 2022-01-18 DIAGNOSIS — G47.33 OSA (OBSTRUCTIVE SLEEP APNEA): ICD-10-CM

## 2022-01-18 DIAGNOSIS — F33.1 MAJOR DEPRESSIVE DISORDER, RECURRENT EPISODE, MODERATE (H): Primary | ICD-10-CM

## 2022-01-18 DIAGNOSIS — F43.10 PTSD (POST-TRAUMATIC STRESS DISORDER): ICD-10-CM

## 2022-01-18 DIAGNOSIS — Z79.899 HIGH RISK MEDICATION USE: ICD-10-CM

## 2022-01-18 PROCEDURE — 99215 OFFICE O/P EST HI 40 MIN: CPT | Performed by: PSYCHIATRY & NEUROLOGY

## 2022-01-18 PROCEDURE — G0463 HOSPITAL OUTPT CLINIC VISIT: HCPCS

## 2022-01-18 RX ORDER — PRAZOSIN HYDROCHLORIDE 1 MG/1
1 CAPSULE ORAL AT BEDTIME
Qty: 30 CAPSULE | Refills: 1 | Status: SHIPPED | OUTPATIENT
Start: 2022-01-18 | End: 2022-03-08

## 2022-01-18 ASSESSMENT — PATIENT HEALTH QUESTIONNAIRE - PHQ9
SUM OF ALL RESPONSES TO PHQ QUESTIONS 1-9: 0
SUM OF ALL RESPONSES TO PHQ QUESTIONS 1-9: 8
SUM OF ALL RESPONSES TO PHQ QUESTIONS 1-9: 10

## 2022-01-18 NOTE — PROGRESS NOTES
Charge captured ? [x] yes  [] no    Medications Phoned  to Pharmacy [] yes [x]no  Name of Pharmacist:  List Medications, including dose, quantity and instructions    Medications ordered this visit were e-scribed.  Verified by order class [x] yes  [] no   Prazosin 1mg    Medication changes or discontinuations were communicated to patient's pharmacy: [] yes  [x] no    UA collected [] yes  [x] no    Outside referrals / labs, etc support staff to follow up: [] yes  [x] no    Future appointment was made: [x] yes  [] no  4/19/22  Dictation completed at time of chart check: [] yes  [x] no    I have checked the documentation for today s encounters and the above information has been reviewed and completed.      Regla Storey CMA on January 18, 2022 at 2:14 PM

## 2022-01-18 NOTE — PROGRESS NOTES
Patient in clinic for medication follow up.   [place]  to be reviewed by provider.   Regla Storey CMA on 1/18/2022 at 9:45 AM

## 2022-01-18 NOTE — PROGRESS NOTES
"    Medical Decision Makin. Schizoaffective disorder, depressive type (H)  Denies depression.  Reports occasional VH while awake and in dreams.    Plan:   Continue Seroquel 200 mg nightly, Cymbalta 120 mg twice daily.      2. PTSD (post-traumatic stress disorder)  Increased symptoms since stepfather  2021.  \"Everything coming back\".   Endorses scary dreams- \"see someone trying to kill or hurt someone\".  Bites tongue (I observed bite joe on left lateral side of tongue).  Plan: Teaching done on risks/side effects of prazosin.  Warned about dizziness.  - prazosin (MINIPRESS) 1 mg by mouth At Bedtime.  - Adult Mental Health Referral- Pt wants to resume therapy with Loretta FRANCO.  Order placed, staff message sent.    3. CHRIS (obstructive sleep apnea)  Cannot tolerate CPAP.  \"I put it on for an hour, then have to take it off. I am to plugged up\".  Uses Flonase.  Plan: Recommend pt talk to sleep clinic and sleep appliance company about this.  Try OTC Xlear moisturizing nasal spray.    4. High risk medication use  DISCUS score = 0 (done 10/05/21).  Neuroleptic consent discussed and signed today.  Topamax 25 mg nightly to target appetite and weight.    5. Epilepsy  Managed by Vikki Benitez MD, [place] [facility].      Return in about 3 mos.-phone appointment.         History of Present Illness:   Valentina Olmedo is a 61 y.o. female here for SCAD, bipolar type, PTSD, Neurocognitive disorder, seizure disorder, here with  René.    Pt reports increased PTSD re experiencing sx, occasional VH, irritability.      In-home services:  -PCA  -Med set up q Monday  -Food from [facility] meals, [facility]    Weekly INR draw at clinic (3 min drive).      SOC: Chronic, René. Cat, \"Yosi\".       ROS:  Denies SI/manic/sx.  See HPI, otherwise negative.      Current Outpatient Medications:      albuterol (PROAIR HFA) 108 (90 Base) MCG/ACT inhaler, Inhale 2 puffs into the lungs every 4 hours as needed for " shortness of breath / dyspnea or wheezing, Disp: 8 g, Rfl: 4     Brivaracetam (BRIVIACT) 100 MG tablet, Take 150 mg by mouth 2 times daily , Disp: , Rfl:      Calcium Citrate-Vitamin D (CALCIUM + D PO), Take 1 tablet by mouth, Disp: , Rfl:      carBAMazepine (TEGRETOL) 200 MG tablet, Take 300-400 mg by mouth 300mg in AM, 300mg in afternoon and 300mg at PM., Disp: , Rfl:      Cyanocobalamin (VITAMIN B12) 1000 MCG TBCR, Take 1 tablet by mouth, Disp: , Rfl:      DULoxetine (CYMBALTA) 60 MG capsule, Take 2 capsules (120 mg) by mouth daily, Disp: 180 capsule, Rfl: 1     EPINEPHrine 0.15 MG/0.15ML SOAJ, Inject 1 each into the muscle , Disp: , Rfl:      fluticasone (FLONASE) 50 MCG/ACT nasal spray, Spray 1 spray in nostril daily, Disp: , Rfl:      hydrochlorothiazide (HYDRODIURIL) 25 MG tablet, Take 1 tablet (25 mg) by mouth daily, Disp: 90 tablet, Rfl: 3     hydrOXYzine (ATARAX) 10 MG tablet, Take 1 tablet (10 mg) by mouth 2 times daily as needed for anxiety, Disp: 60 tablet, Rfl: 2     levothyroxine (SYNTHROID/LEVOTHROID) 300 MCG tablet, Take 1 tablet (300 mcg) by mouth daily, Disp: 90 tablet, Rfl: 3     loratadine (CLARITIN) 10 MG tablet, TAKE 1 TABLET (10MG) BY MOUTH DAILY, Disp: 90 tablet, Rfl: 3     melatonin 10 MG TABS tablet, Take 1 tablet (10 mg) by mouth nightly as needed for sleep, Disp: 30 tablet, Rfl: 2     multivitamin with iron (CHROMAGEN) TABS half-tab, , Disp: , Rfl:      Nutritional Supplements (NUTRITIONAL SHAKE HIGH PROTEIN) LIQD, Take 1 each by mouth every morning Dispense Premier protein shakes. Replace breakfast with 1 shake daily., Disp: 9900 mL, Rfl: 11     olopatadine (PATADAY) 0.2 % ophthalmic solution, Place 1 drop into both eyes daily, Disp: , Rfl:      omeprazole (PRILOSEC) 20 MG DR capsule, Take 20 mg by mouth 2 times daily , Disp: , Rfl:      oxybutynin ER (DITROPAN-XL) 10 MG 24 hr tablet, Take 1 tablet (10 mg) by mouth daily, Disp: 90 tablet, Rfl: 3     Polyethylene Glycol POWD, See Admin  Instructions, Disp: , Rfl:      prazosin (MINIPRESS) 1 MG capsule, Take 1 capsule (1 mg) by mouth At Bedtime, Disp: 30 capsule, Rfl: 1     QUEtiapine (SEROQUEL) 200 MG tablet, Take 1 tablet (200 mg) by mouth At Bedtime, Disp: 90 tablet, Rfl: 1     rosuvastatin (CRESTOR) 20 MG tablet, TAKE 1 TABLET BY MOUTH DAILY, Disp: 90 tablet, Rfl: 3     salmeterol (SEREVENT) 50 MCG/DOSE inhaler, Inhale 1 puff into the lungs 2 times daily, Disp: 180 each, Rfl: 4     SENNA-TIME 8.6 MG tablet, TAKE 2 TABLETS (17.2MG) BY MOUTH TWICE A DAY, Disp: 360 tablet, Rfl: 3     tiZANidine (ZANAFLEX) 4 MG tablet, Take 1 tablet (4 mg) by mouth 3 times daily, Disp: 42 tablet, Rfl: 3     topiramate (TOPAMAX) 25 MG tablet, Take 1 tablet (25 mg) by mouth At Bedtime, Disp: 30 tablet, Rfl: 4     Vitamin D-Vitamin K (VITAMIN K2-VITAMIN D3)  MCG-UNIT CAPS, 2 capsules, Disp: , Rfl:      vitamin D3 (CHOLECALCIFEROL) 50 mcg (2000 units) tablet, Take 2 tablets (100 mcg) by mouth daily, Disp: 180 tablet, Rfl: 3     warfarin ANTICOAGULANT (COUMADIN) 5 MG tablet, Take 2 tablets (10mg) to 3 tablets (15mg) by mouth daily, as directed. Adjust dose based on INR results., Disp: 220 tablet, Rfl: 1    Current Facility-Administered Medications:      denosumab (PROLIA) injection 60 mg, 60 mg, Subcutaneous, Q6 Months, Alice Rolle MD, 60 mg at 11/12/21 1251       /82 (BP Location: Right arm, Patient Position: Right side, Cuff Size: Adult Large)   Pulse 86   LMP  (LMP Unknown)      MSE:      Alert & oriented x 3.   Appearance: Neat.  Speech: Normal rate, rhythm and tone.  Gait: Not observed, in w/c  Musculoskeletal: No abnormal movements-had pt remove mask and observed tongue.  No fasciculations or movement.  Mood/Affect: Full range  Thought Process: Normal rate, perseverative  Thought Content: No suicide or homicide ideation.  Associations: Intact, no delusions.  Perceptions: No hallucinations.  Memory: recent and remote memory intact, not formally  tested  Attention span and concentration: normal, not formally tested  Language: Intact.  Fund of Knowledge: Normal.  Insight and Judgement: Adequate.         Total time spent on this encounter, on the date of service, including pre-visit review of separately obtained history, face-to-face interaction performing medically appropriate physical exam, patient counseling/education, interpretation of diagnostic results, care coordination and documentation was 60 minutes.            Breana Puente MD

## 2022-01-24 ENCOUNTER — LAB (OUTPATIENT)
Dept: LAB | Facility: CLINIC | Age: 62
End: 2022-01-24
Payer: MEDICARE

## 2022-01-24 ENCOUNTER — ANTICOAGULATION THERAPY VISIT (OUTPATIENT)
Dept: ANTICOAGULATION | Facility: CLINIC | Age: 62
End: 2022-01-24

## 2022-01-24 DIAGNOSIS — Z86.711 HISTORY OF PULMONARY EMBOLISM: Primary | ICD-10-CM

## 2022-01-24 DIAGNOSIS — I26.99 PULMONARY EMBOLISM WITH INFARCTION (H): ICD-10-CM

## 2022-01-24 LAB — INR BLD: 1.6 (ref 0.9–1.1)

## 2022-01-24 PROCEDURE — 85610 PROTHROMBIN TIME: CPT

## 2022-01-24 PROCEDURE — 36416 COLLJ CAPILLARY BLOOD SPEC: CPT

## 2022-01-24 NOTE — PROGRESS NOTES
ANTICOAGULATION MANAGEMENT     Valentina ORNELAS Juan Ramonjuan diego 61 year old female is on warfarin with subtherapeutic INR result. (Goal INR 2.0-3.0)    Recent labs: (last 7 days)     01/24/22  1318   INR 1.6*       ASSESSMENT     Source(s): Chart Review and Patient/Caregiver Call       Warfarin doses taken: Missed dose(s) may be affecting INR missed wed, calendar locked    Diet: No new diet changes identified    New illness, injury, or hospitalization: No    Medication/supplement changes: None noted    Signs or symptoms of bleeding or clotting: No    Previous INR: Therapeutic last 2(+) visits    Additional findings: None     PLAN     Recommended plan for no diet, medication or health factor changes affecting INR     Dosing Instructions:  Increase your warfarin dose (15% change) with next INR in 1 week       Summary  As of 1/24/2022    Full warfarin instructions:  15 mg every Mon; 10 mg all other days   Next INR check:  1/31/2022             Telephone call with Valentina and  Nurse Arlyn who verbalizes understanding and agrees to plan    Lab visit scheduled    Education provided: None required    Plan made per [facility] anticoagulation protocol    Gustavo Gamez RN  Anticoagulation Clinic  1/24/2022    _______________________________________________________________________     Anticoagulation Episode Summary     Current INR goal:  2.0-3.0   TTR:  48.0 % (1 y)   Target end date:  Indefinite   Send INR reminders to:  [facility]    Indications    History of pulmonary embolism [Z86.711]  Pulmonary embolism with infarction (H) [I26.99]           Comments:           Anticoagulation Care Providers     Provider Role Specialty Phone number    Promise Vanegas MD Referring Family Medicine [phone]    Donald Rooney MD Referring Internal Medicine [phone]

## 2022-01-31 ENCOUNTER — ANTICOAGULATION THERAPY VISIT (OUTPATIENT)
Dept: ANTICOAGULATION | Facility: CLINIC | Age: 62
End: 2022-01-31

## 2022-01-31 ENCOUNTER — LAB (OUTPATIENT)
Dept: LAB | Facility: CLINIC | Age: 62
End: 2022-01-31
Payer: MEDICARE

## 2022-01-31 DIAGNOSIS — I26.99 PULMONARY EMBOLISM WITH INFARCTION (H): ICD-10-CM

## 2022-01-31 DIAGNOSIS — Z86.711 HISTORY OF PULMONARY EMBOLISM: Primary | ICD-10-CM

## 2022-01-31 LAB — INR BLD: 1.7 (ref 0.9–1.1)

## 2022-01-31 PROCEDURE — 36416 COLLJ CAPILLARY BLOOD SPEC: CPT

## 2022-01-31 PROCEDURE — 85610 PROTHROMBIN TIME: CPT

## 2022-01-31 NOTE — PROGRESS NOTES
ANTICOAGULATION MANAGEMENT     Valentina Olmedo 61 year old female is on warfarin with subtherapeutic INR result. (Goal INR 2.0-3.0)    Recent labs: (last 7 days)     01/31/22  1318   INR 1.7*       ASSESSMENT     Source(s): Chart Review and Patient/Caregiver Call       Warfarin doses taken: Warfarin taken as instructed    Diet: No new diet changes identified    New illness, injury, or hospitalization: No    Medication/supplement changes: None noted    Signs or symptoms of bleeding or clotting: No    Previous INR: Subtherapeutic    Additional findings: None     PLAN     Recommended plan for no diet, medication or health factor changes affecting INR     Dosing Instructions:  Increase your warfarin dose (13.3% change) with next INR in 1 week       Summary  As of 1/31/2022    Next INR check:               Telephone call with Valentina who verbalizes understanding and agrees to plan  Called nurse Stoddard and reported plan    Lab visit scheduled    Education provided: None required    Plan made per [facility] anticoagulation protocol    Gustavo Gamez RN  Anticoagulation Clinic  1/31/2022    _______________________________________________________________________     Anticoagulation Episode Summary     Current INR goal:  2.0-3.0   TTR:  48.0 % (1 y)   Target end date:  Indefinite   Send INR reminders to:  [facility]    Indications    History of pulmonary embolism [Z86.711]  Pulmonary embolism with infarction (H) [I26.99]           Comments:           Anticoagulation Care Providers     Provider Role Specialty Phone number    Promise Vanegas MD Referring Family Medicine [phone]    Donald Rooney MD Referring Internal Medicine [phone]

## 2022-02-07 ENCOUNTER — LAB (OUTPATIENT)
Dept: LAB | Facility: CLINIC | Age: 62
End: 2022-02-07
Payer: MEDICARE

## 2022-02-07 ENCOUNTER — ANTICOAGULATION THERAPY VISIT (OUTPATIENT)
Dept: ANTICOAGULATION | Facility: CLINIC | Age: 62
End: 2022-02-07

## 2022-02-07 DIAGNOSIS — I26.99 PULMONARY EMBOLISM WITH INFARCTION (H): ICD-10-CM

## 2022-02-07 DIAGNOSIS — Z86.711 HISTORY OF PULMONARY EMBOLISM: Primary | ICD-10-CM

## 2022-02-07 LAB — INR BLD: 3.3 (ref 0.9–1.1)

## 2022-02-07 PROCEDURE — 36416 COLLJ CAPILLARY BLOOD SPEC: CPT

## 2022-02-07 PROCEDURE — 85610 PROTHROMBIN TIME: CPT

## 2022-02-07 NOTE — PROGRESS NOTES
ANTICOAGULATION MANAGEMENT     Valentina Olmedo 61 year old female is on warfarin with supratherapeutic INR result. (Goal INR 2.0-3.0)    Recent labs: (last 7 days)     02/07/22  1308   INR 3.3*       ASSESSMENT     Source(s): Chart Review and Template       Warfarin doses taken: Warfarin taken as instructed    Diet: Decreased greens/vitamin K in diet; plans to resume previous intake    New illness, injury, or hospitalization: No    Medication/supplement changes: None noted    Signs or symptoms of bleeding or clotting: No    Previous INR: Subtherapeutic    Additional findings: None     PLAN     Recommended plan for no diet, medication or health factor changes affecting INR     Dosing Instructions: Hold dose then continue your current warfarin dose with next INR in 2 weeks       Summary  As of 2/7/2022    Full warfarin instructions:  2/7: Hold; Otherwise 15 mg every Mon, Wed, Fri; 10 mg all other days   Next INR check:  2/21/2022             Telephone call with Valentina and nurse Arlyn who verbalizes understanding and agrees to plan    Lab visit scheduled    Education provided: None required    Plan made per [facility] anticoagulation protocol    Gustavo Gamez, RAJEEV  Anticoagulation Clinic  2/7/2022    _______________________________________________________________________     Anticoagulation Episode Summary     Current INR goal:  2.0-3.0   TTR:  47.4 % (1 y)   Target end date:  Indefinite   Send INR reminders to:  [facility]    Indications    History of pulmonary embolism [Z86.711]  Pulmonary embolism with infarction (H) [I26.99]           Comments:           Anticoagulation Care Providers     Provider Role Specialty Phone number    Promise Vanegas MD Referring Family Medicine [phone]    Donald Rooney MD Referring Internal Medicine [phone]

## 2022-02-21 ENCOUNTER — ANTICOAGULATION THERAPY VISIT (OUTPATIENT)
Dept: ANTICOAGULATION | Facility: CLINIC | Age: 62
End: 2022-02-21

## 2022-02-21 ENCOUNTER — LAB (OUTPATIENT)
Dept: LAB | Facility: CLINIC | Age: 62
End: 2022-02-21
Payer: MEDICARE

## 2022-02-21 DIAGNOSIS — I26.99 PULMONARY EMBOLISM WITH INFARCTION (H): ICD-10-CM

## 2022-02-21 DIAGNOSIS — Z86.711 HISTORY OF PULMONARY EMBOLISM: Primary | ICD-10-CM

## 2022-02-21 LAB — INR BLD: 2.7 (ref 0.9–1.1)

## 2022-02-21 PROCEDURE — 85610 PROTHROMBIN TIME: CPT

## 2022-02-21 PROCEDURE — 36416 COLLJ CAPILLARY BLOOD SPEC: CPT

## 2022-02-21 NOTE — PROGRESS NOTES
ANTICOAGULATION MANAGEMENT     Valentina Olmedo 62 year old female is on warfarin with therapeutic INR result. (Goal INR 2.0-3.0)    Recent labs: (last 7 days)     02/21/22  1423   INR 2.7*       ASSESSMENT     Source(s): Chart Review, Patient/Caregiver Call and Template       Warfarin doses taken: Warfarin taken as instructed    Diet: No new diet changes identified.    New illness, injury, or hospitalization: No   OK to eat her salads 2x/wk.    Medication/supplement changes: None noted    Signs or symptoms of bleeding or clotting: No    Previous INR: Supratherapeutic at 3.5 on 2/7/22.    Additional findings:  RN Community nurse comes to her house to set up all her meds.     PLAN     Recommended plan for no diet, medication or health factor changes affecting INR     Dosing Instructions:   (evenings. 5mg tabs)    Continue your current warfarin dose with next INR in 2-3 weeks       Summary  As of 2/21/2022    Full warfarin instructions:  15 mg every Mon, Wed, Fri; 10 mg all other days   Next INR check:  3/14/2022             Telephone call with  Valentina ([phone]) who verbalizes understanding and agrees to plan    Lab visit scheduled - INR on 3/7/22 @ [facility].    Education provided: Importance of consistent vitamin K intake and Goal range and significance of current result    Plan made per [facility] anticoagulation protocol    Shonna Ernandez, RN  Anticoagulation Clinic  2/21/2022    _______________________________________________________________________     Anticoagulation Episode Summary     Current INR goal:  2.0-3.0   TTR:  48.7 % (1 y)   Target end date:  Indefinite   Send INR reminders to:  [facility]    Indications    History of pulmonary embolism [Z86.711]  Pulmonary embolism with infarction (H) [I26.99]           Comments:           Anticoagulation Care Providers     Provider Role Specialty Phone number    Promise Vanegas MD Referring Family Medicine [phone]    Donald Rooney MD Referring  Internal Medicine [phone]

## 2022-03-02 DIAGNOSIS — F43.10 PTSD (POST-TRAUMATIC STRESS DISORDER): ICD-10-CM

## 2022-03-02 NOTE — TELEPHONE ENCOUNTER
"Per [facility] Pharmacist, requesting early refill in order to accomplish compliance medication packaging.    Date of Last Office Visit: 22  Date of Next Office Visit: 22  No shows since last visit: none  Cancellations since last visit: none    Medication requested: Prazosin 1 mg Date last ordered: 22 Qty: 30 Refills: 1     Review of [place] ?: NA    Lapse in medication adherence greater than 5 days?: no  If yes, call patient and gather details: NA  Medication refill request verified as identical to current order?: yes  Result of Last DAM, VPA, Li+ Level, CBC, or Carbamazepine Level (at or since last visit): N/A    Last visit treatment plan:   Progress Notes  Breana Puente MD (Physician)     Psychiatry        Medical Decision Makin. Schizoaffective disorder, depressive type (H)  Denies depression.  Reports occasional VH while awake and in dreams.    Plan:   Continue Seroquel 200 mg nightly, Cymbalta 120 mg twice daily.     2. PTSD (post-traumatic stress disorder)  Increased symptoms since stepfather  2021.  \"Everything coming back\".   Endorses scary dreams- \"see someone trying to kill or hurt someone\".  Bites tongue (I observed bite joe on left lateral side of tongue).  Plan: Teaching done on risks/side effects of prazosin.  Warned about dizziness.  - prazosin (MINIPRESS) 1 mg by mouth At Bedtime.  - Adult Mental Health Referral- Pt wants to resume therapy with Loretta FRANCO.  Order placed, staff message sent.     3. CHRIS (obstructive sleep apnea)  Cannot tolerate CPAP.  \"I put it on for an hour, then have to take it off. I am to plugged up\".  Uses Flonase.  Plan: Recommend pt talk to sleep clinic and sleep appliance company about this.  Try OTC Xlear moisturizing nasal spray.     4. High risk medication use  DISCUS score = 0 (done 10/05/21).  Neuroleptic consent discussed and signed today.  Topamax 25 mg nightly to target appetite and weight.     5. Epilepsy  Managed by " Vikki Benitez MD, [place] [facility].     Return in about 3 mos.-phone appointment.            []Medication refilled per  Medication Refill in Ambulatory Care  policy.  [x]Medication unable to be refilled by RN due to criteria not met as indicated below:    []Eligibility - not seen in the last year   []Supervision - no future appointment   []Compliance - no shows, cancellations or lapse in therapy   []Verification - order discrepancy   []Controlled medication   []Medication not included in policy   [x]60-day supply request   []Other

## 2022-03-03 ENCOUNTER — TELEPHONE (OUTPATIENT)
Dept: BEHAVIORAL HEALTH | Facility: CLINIC | Age: 62
End: 2022-03-03
Payer: MEDICARE

## 2022-03-03 NOTE — TELEPHONE ENCOUNTER
Received fax paperwork from [place] that request provider signature and return to [phone] via fax.   This RN sent paperwork to Dr. Puente's email and sent to HIM to be uploated    Ludy Keita RN on 3/3/2022 at 1:20 PM

## 2022-03-07 ENCOUNTER — LAB (OUTPATIENT)
Dept: LAB | Facility: CLINIC | Age: 62
End: 2022-03-07
Payer: MEDICARE

## 2022-03-07 ENCOUNTER — ANTICOAGULATION THERAPY VISIT (OUTPATIENT)
Dept: ANTICOAGULATION | Facility: CLINIC | Age: 62
End: 2022-03-07

## 2022-03-07 DIAGNOSIS — Z86.711 HISTORY OF PULMONARY EMBOLISM: Primary | ICD-10-CM

## 2022-03-07 DIAGNOSIS — I26.99 PULMONARY EMBOLISM WITH INFARCTION (H): ICD-10-CM

## 2022-03-07 DIAGNOSIS — F41.9 ANXIETY: ICD-10-CM

## 2022-03-07 LAB — INR BLD: 6.1 (ref 0.9–1.1)

## 2022-03-07 PROCEDURE — 85610 PROTHROMBIN TIME: CPT

## 2022-03-07 PROCEDURE — 36416 COLLJ CAPILLARY BLOOD SPEC: CPT

## 2022-03-07 NOTE — PROGRESS NOTES
"ANTICOAGULATION  MANAGEMENT    Valentina Olmedo is on warfarin and had a point of care INR result > 5.5 or an \"error message\" on point of care meter today.        Valentina via phone while at the lab and reviewed triage questions for potential signs and symptoms of bleeding.      Patient Response     Have you had any bleeding in the last week?   NO   Have you passed any red, black, or tarry stools in last week?   NO   Have you vomited/spit up any red or coffee ground material in last week?   NO   Have you had any new, severe abdominal pain or bloating develop in last week?   NO   Have you fallen or had any injuries in last week?   NO   Do you currently have a severe, sudden onset headache?   NO   Do you currently have any severe sudden changes in your vision?   NO   Do you currently have any new onset numbness, weakness/paralysis?   NO     Assessment/Plan:     Valentina's responses were negative for signs and symptoms of bleeding; may discharge from clinic    Valentina instructed to:       Hold warfarin today until venous INR result returns and receives follow up call from [facility]    Seek medical attention for new signs and symptoms of bleeding or a fall with injury.       "

## 2022-03-07 NOTE — PROGRESS NOTES
ANTICOAGULATION MANAGEMENT     Valentina Olmedo 62 year old female is on warfarin with therapeutic INR result. (Goal INR 2.0-3.0)    Recent labs: (last 7 days)     03/07/22  1343   INR 6.1*       ASSESSMENT       Source(s): Chart Review and Patient/Caregiver Call       Warfarin doses taken: Warfarin taken as instructed    Diet: No new diet changes identified    New illness, injury, or hospitalization: No    Medication/supplement changes: None noted    Signs or symptoms of bleeding or clotting: No, triage questions below,  Valentina knows to be seen for active bleeding or head injury    Previous INR: Therapeutic last 2(+) visits    Additional findings: None       PLAN     Recommended plan for no diet, medication or health factor changes affecting INR     Dosing Instructions: Hold 2 doses then continue your current warfarin dose with next INR in 2 days       Summary  As of 3/7/2022    Full warfarin instructions:  3/7: Hold; 3/8: Hold; Otherwise 15 mg every Mon, Wed, Fri; 10 mg all other days   Next INR check:  3/9/2022             Telephone call with Valentina who verbalizes understanding and agrees to plan    Lab visit scheduled    Education provided: None required    Plan made per [facility] anticoagulation protocol    Gustavo Gamez RN  Anticoagulation Clinic  3/7/2022    _______________________________________________________________________     Anticoagulation Episode Summary     Current INR goal:  2.0-3.0   TTR:  48.0 % (1 y)   Target end date:  Indefinite   Send INR reminders to:  [facility]    Indications    History of pulmonary embolism [Z86.711]  Pulmonary embolism with infarction (H) [I26.99]           Comments:           Anticoagulation Care Providers     Provider Role Specialty Phone number    Promise Vanegas MD Referring Family Medicine [phone]    Donald Rooney MD Referring Internal Medicine [phone]

## 2022-03-07 NOTE — TELEPHONE ENCOUNTER
"Date of Last Office Visit: 22  Date of Next Office Visit: 22  No shows since last visit: none  Cancellations since last visit: none    Medication requested: hydroxyzine 10 mg Date last ordered: 10/5/2021 Qty: 60 Refills: 2     Review of [place] ?: NA    Lapse in medication adherence greater than 5 days?: uses PRN  If yes, call patient and gather details: nurse requested  Medication refill request verified as identical to current order?: yes  Result of Last DAM, VPA, Li+ Level, CBC, or Carbamazepine Level (at or since last visit): N/A    Last visit treatment plan:   Medical Decision Makin. Schizoaffective disorder, depressive type (H)  Denies depression.  Reports occasional VH while awake and in dreams.    Plan:   Continue Seroquel 200 mg nightly, Cymbalta 120 mg twice daily.        2. PTSD (post-traumatic stress disorder)  Increased symptoms since stepfather  2021.  \"Everything coming back\".   Endorses scary dreams- \"see someone trying to kill or hurt someone\".  Bites tongue (I observed bite joe on left lateral side of tongue).  Plan: Teaching done on risks/side effects of prazosin.  Warned about dizziness.  - prazosin (MINIPRESS) 1 mg by mouth At Bedtime.  - Adult Mental Health Referral- Pt wants to resume therapy with Loretta FRANCO.  Order placed, staff message sent.     3. CHRIS (obstructive sleep apnea)  Cannot tolerate CPAP.  \"I put it on for an hour, then have to take it off. I am to plugged up\".  Uses Flonase.  Plan: Recommend pt talk to sleep clinic and sleep appliance company about this.  Try OTC Xlear moisturizing nasal spray.     4. High risk medication use  DISCUS score = 0 (done 10/05/21).  Neuroleptic consent discussed and signed today.  Topamax 25 mg nightly to target appetite and weight.     5. Epilepsy  Managed by Vikki Benitez MD, [place] [facility].        Return in about 3 mos.-phone appointment.      []Medication refilled per  Medication Refill in Ambulatory " Care  policy.  [x]Medication unable to be refilled by RN due to criteria not met as indicated below:    []Eligibility - not seen in the last year   []Supervision - no future appointment   []Compliance - no shows, cancellations or lapse in therapy   []Verification - order discrepancy   []Controlled medication   []Medication not included in policy   [x]90-day supply request   []Other

## 2022-03-08 ENCOUNTER — TELEPHONE (OUTPATIENT)
Dept: BEHAVIORAL HEALTH | Facility: CLINIC | Age: 62
End: 2022-03-08
Payer: MEDICARE

## 2022-03-08 DIAGNOSIS — Z53.9 DIAGNOSIS NOT YET DEFINED: Primary | ICD-10-CM

## 2022-03-08 PROCEDURE — G0179 MD RECERTIFICATION HHA PT: HCPCS | Performed by: FAMILY MEDICINE

## 2022-03-08 RX ORDER — HYDROXYZINE HYDROCHLORIDE 10 MG/1
10 TABLET, FILM COATED ORAL 2 TIMES DAILY PRN
Qty: 60 TABLET | Refills: 6 | Status: SHIPPED | OUTPATIENT
Start: 2022-03-08 | End: 2022-08-02

## 2022-03-08 RX ORDER — PRAZOSIN HYDROCHLORIDE 1 MG/1
1 CAPSULE ORAL AT BEDTIME
Qty: 30 CAPSULE | Refills: 6 | Status: SHIPPED | OUTPATIENT
Start: 2022-03-08 | End: 2022-09-06

## 2022-03-08 NOTE — TELEPHONE ENCOUNTER
Paperwork, Home Health Certification and Plan of Care, received for provider signature. Placed in provider mailbox.

## 2022-03-09 ENCOUNTER — ANTICOAGULATION THERAPY VISIT (OUTPATIENT)
Dept: ANTICOAGULATION | Facility: CLINIC | Age: 62
End: 2022-03-09

## 2022-03-09 ENCOUNTER — LAB (OUTPATIENT)
Dept: LAB | Facility: CLINIC | Age: 62
End: 2022-03-09
Payer: MEDICARE

## 2022-03-09 DIAGNOSIS — I26.99 PULMONARY EMBOLISM WITH INFARCTION (H): ICD-10-CM

## 2022-03-09 DIAGNOSIS — Z86.711 HISTORY OF PULMONARY EMBOLISM: Primary | ICD-10-CM

## 2022-03-09 LAB — INR BLD: 1.5 (ref 0.9–1.1)

## 2022-03-09 PROCEDURE — 85610 PROTHROMBIN TIME: CPT

## 2022-03-09 PROCEDURE — 36416 COLLJ CAPILLARY BLOOD SPEC: CPT

## 2022-03-09 NOTE — PROGRESS NOTES
ANTICOAGULATION MANAGEMENT     Valentina Olmedo 62 year old female is on warfarin with subtherapeutic INR result. (Goal INR 2.0-3.0)    Recent labs: (last 7 days)     03/09/22  1329   INR 1.5*       ASSESSMENT       Source(s): Chart Review, Patient/Caregiver Call and Template       Warfarin doses taken: Warfarin recently held for 2 days which may be affecting INR    - held warfarin on 3/7-8 as instructed.    Diet: No new diet changes identified    New illness, injury, or hospitalization: No    Medication/supplement changes: None noted    Signs or symptoms of bleeding or clotting: No    Previous INR: Supratherapeutic at 6.1 on 3/7/22.    Additional findings:  RN [facility] nurses comes to her house to set up medications.       PLAN     Recommended plan for no diet, medication or health factor changes affecting INR     Dosing Instructions:   (evenings. 5mg tabs)   - OK to resume warfarin tonight.   - Continue your current warfarin dose with next INR in 7-10 days       Summary  As of 3/9/2022    Full warfarin instructions:  15 mg every Mon, Wed, Fri; 10 mg all other days   Next INR check:  3/18/2022             Telephone call with  Valentina ([phone]) who verbalizes understanding and agrees to plan    Check at provider office visit - INR on 3/16/22 during OV with Dr. Starr.    Education provided: Importance of consistent vitamin K intake, Impact of vitamin K foods on INR and Goal range and significance of current result    Plan made per [facility] anticoagulation protocol    Shonna Ernandez, RN  Anticoagulation Clinic  3/9/2022    _______________________________________________________________________     Anticoagulation Episode Summary     Current INR goal:  2.0-3.0   TTR:  48.1 % (1 y)   Target end date:  Indefinite   Send INR reminders to:  [facility]    Indications    History of pulmonary embolism [Z86.711]  Pulmonary embolism with infarction (H) [I26.99]           Comments:           Anticoagulation Care  Providers     Provider Role Specialty Phone number    Promise Vanegas MD Referring Family Medicine [phone]    Donald Rooney MD Referring Internal Medicine [phone]

## 2022-03-15 ENCOUNTER — ALLIED HEALTH/NURSE VISIT (OUTPATIENT)
Dept: PULMONOLOGY | Facility: OTHER | Age: 62
End: 2022-03-15
Payer: MEDICARE

## 2022-03-15 ENCOUNTER — OFFICE VISIT (OUTPATIENT)
Dept: PULMONOLOGY | Facility: OTHER | Age: 62
End: 2022-03-15
Attending: FAMILY MEDICINE
Payer: MEDICARE

## 2022-03-15 VITALS
OXYGEN SATURATION: 96 % | HEART RATE: 68 BPM | DIASTOLIC BLOOD PRESSURE: 80 MMHG | RESPIRATION RATE: 18 BRPM | SYSTOLIC BLOOD PRESSURE: 116 MMHG

## 2022-03-15 DIAGNOSIS — J44.9 CHRONIC OBSTRUCTIVE PULMONARY DISEASE, UNSPECIFIED COPD TYPE (H): ICD-10-CM

## 2022-03-15 DIAGNOSIS — K21.9 GASTROESOPHAGEAL REFLUX DISEASE WITHOUT ESOPHAGITIS: ICD-10-CM

## 2022-03-15 DIAGNOSIS — R05.9 COUGH: ICD-10-CM

## 2022-03-15 DIAGNOSIS — J44.9 COPD (CHRONIC OBSTRUCTIVE PULMONARY DISEASE) (H): ICD-10-CM

## 2022-03-15 DIAGNOSIS — J30.9 ALLERGIC RHINITIS, UNSPECIFIED SEASONALITY, UNSPECIFIED TRIGGER: ICD-10-CM

## 2022-03-15 DIAGNOSIS — E66.01 MORBID OBESITY (H): ICD-10-CM

## 2022-03-15 DIAGNOSIS — G47.30 SLEEP DISORDER BREATHING: ICD-10-CM

## 2022-03-15 DIAGNOSIS — R53.81 PHYSICAL DECONDITIONING: ICD-10-CM

## 2022-03-15 DIAGNOSIS — Z72.0 TOBACCO USER: ICD-10-CM

## 2022-03-15 DIAGNOSIS — R06.00 DYSPNEA, UNSPECIFIED TYPE: Primary | ICD-10-CM

## 2022-03-15 LAB
DLCOCOR-%PRED-PRE: 91 %
DLCOCOR-PRE: 19.78 ML/MIN/MMHG
DLCOUNC-%PRED-PRE: 94 %
DLCOUNC-PRE: 20.41 ML/MIN/MMHG
DLCOUNC-PRED: 21.54 ML/MIN/MMHG
ERV-%PRED-PRE: -393 %
ERV-PRE: 0.26 L
ERV-PRED: -0.07 L
EXPTIME-PRE: 7.6 SEC
FEF2575-%PRED-PRE: 94 %
FEF2575-PRE: 2.19 L/SEC
FEF2575-PRED: 2.33 L/SEC
FEFMAX-%PRED-PRE: 80 %
FEFMAX-PRE: 5.28 L/SEC
FEFMAX-PRED: 6.55 L/SEC
FEV1-%PRED-PRE: 85 %
FEV1-PRE: 2.25 L
FEV1FEV6-PRE: 81 %
FEV1FEV6-PRED: 81 %
FEV1FVC-PRE: 81 %
FEV1FVC-PRED: 79 %
FEV1SVC-PRE: 71 %
FEV1SVC-PRED: 75 %
FIFMAX-PRE: 2.45 L/SEC
FRCPLETH-%PRED-PRE: 69 %
FRCPLETH-PRE: 1.97 L
FRCPLETH-PRED: 2.82 L
FVC-%PRED-PRE: 82 %
FVC-PRE: 2.78 L
FVC-PRED: 3.36 L
HGB BLD-MCNC: 14.5 G/DL
IC-%PRED-PRE: 81 %
IC-PRE: 2.9 L
IC-PRED: 3.57 L
RVPLETH-%PRED-PRE: 84 %
RVPLETH-PRE: 1.71 L
RVPLETH-PRED: 2.01 L
TLCPLETH-%PRED-PRE: 92 %
TLCPLETH-PRE: 4.87 L
TLCPLETH-PRED: 5.27 L
VA-%PRED-PRE: 87 %
VA-PRE: 4.55 L
VC-%PRED-PRE: 90 %
VC-PRE: 3.16 L
VC-PRED: 3.5 L

## 2022-03-15 PROCEDURE — 99204 OFFICE O/P NEW MOD 45 MIN: CPT | Performed by: INTERNAL MEDICINE

## 2022-03-15 PROCEDURE — 94375 RESPIRATORY FLOW VOLUME LOOP: CPT | Performed by: INTERNAL MEDICINE

## 2022-03-15 PROCEDURE — 94729 DIFFUSING CAPACITY: CPT | Performed by: INTERNAL MEDICINE

## 2022-03-15 PROCEDURE — 94726 PLETHYSMOGRAPHY LUNG VOLUMES: CPT | Performed by: INTERNAL MEDICINE

## 2022-03-15 PROCEDURE — 85018 HEMOGLOBIN: CPT | Performed by: INTERNAL MEDICINE

## 2022-03-15 NOTE — PROGRESS NOTES
Patient oxygen saturation on RA at rest is 94%  Oxygen saturation after ambulating 150ft on RA is 93%    Paula Gonzalez LPN

## 2022-03-15 NOTE — PATIENT INSTRUCTIONS
1. Continue albuterol HFA as needed  2. Smoking cessation counseling  3. Flonase one spray each nostril twice a day   4. Resume CPAP therapy  5. Avoid eating close to bedtime  6. Raise the head of the bed  7. Continue omeprazole daily   8. Referral to physical therapy  9. Follow up 6 months

## 2022-03-16 ENCOUNTER — ANTICOAGULATION THERAPY VISIT (OUTPATIENT)
Dept: ANTICOAGULATION | Facility: CLINIC | Age: 62
End: 2022-03-16

## 2022-03-16 ENCOUNTER — OFFICE VISIT (OUTPATIENT)
Dept: FAMILY MEDICINE | Facility: CLINIC | Age: 62
End: 2022-03-16
Payer: MEDICARE

## 2022-03-16 VITALS
BODY MASS INDEX: 45.99 KG/M2 | HEIGHT: 67 IN | OXYGEN SATURATION: 95 % | SYSTOLIC BLOOD PRESSURE: 132 MMHG | WEIGHT: 293 LBS | HEART RATE: 92 BPM | DIASTOLIC BLOOD PRESSURE: 88 MMHG

## 2022-03-16 DIAGNOSIS — R53.81 PHYSICAL DECONDITIONING: ICD-10-CM

## 2022-03-16 DIAGNOSIS — Z86.711 HISTORY OF PULMONARY EMBOLISM: Primary | ICD-10-CM

## 2022-03-16 DIAGNOSIS — E27.8 ADRENAL MASS, LEFT (H): ICD-10-CM

## 2022-03-16 DIAGNOSIS — E03.9 ACQUIRED HYPOTHYROIDISM: ICD-10-CM

## 2022-03-16 DIAGNOSIS — G40.909 NONINTRACTABLE EPILEPSY WITHOUT STATUS EPILEPTICUS, UNSPECIFIED EPILEPSY TYPE (H): ICD-10-CM

## 2022-03-16 DIAGNOSIS — R73.01 IMPAIRED FASTING GLUCOSE: ICD-10-CM

## 2022-03-16 DIAGNOSIS — Z13.1 SCREENING FOR DIABETES MELLITUS: ICD-10-CM

## 2022-03-16 DIAGNOSIS — I87.8 VENOUS STASIS: ICD-10-CM

## 2022-03-16 DIAGNOSIS — M80.08XD FRACTURE OF VERTEBRA DUE TO OSTEOPOROSIS WITH ROUTINE HEALING, SUBSEQUENT ENCOUNTER: Primary | ICD-10-CM

## 2022-03-16 DIAGNOSIS — Z86.16 HISTORY OF COVID-19: ICD-10-CM

## 2022-03-16 DIAGNOSIS — Z86.711 HISTORY OF PULMONARY EMBOLISM: ICD-10-CM

## 2022-03-16 DIAGNOSIS — C73 MALIGNANT NEOPLASM OF THYROID GLAND (H): ICD-10-CM

## 2022-03-16 DIAGNOSIS — I26.99 PULMONARY EMBOLISM WITH INFARCTION (H): ICD-10-CM

## 2022-03-16 DIAGNOSIS — Z79.899 HIGH RISK MEDICATION USE: ICD-10-CM

## 2022-03-16 PROBLEM — G47.33 OSA (OBSTRUCTIVE SLEEP APNEA): Status: ACTIVE | Noted: 2018-06-12

## 2022-03-16 PROBLEM — M81.8 OSTEOPOROSIS, IDIOPATHIC: Status: RESOLVED | Noted: 2021-10-12 | Resolved: 2022-03-16

## 2022-03-16 PROBLEM — E78.2 MIXED HYPERLIPIDEMIA: Status: ACTIVE | Noted: 2021-08-06

## 2022-03-16 PROBLEM — F25.1 SCHIZOAFFECTIVE DISORDER, DEPRESSIVE TYPE (H): Status: ACTIVE | Noted: 2019-02-06

## 2022-03-16 LAB
HBA1C MFR BLD: 5.6 %
INR BLD: 3.7 (ref 0.9–1.1)
TSH SERPL DL<=0.005 MIU/L-ACNC: 0.59 UIU/ML (ref 0.3–5)

## 2022-03-16 PROCEDURE — 85610 PROTHROMBIN TIME: CPT | Performed by: FAMILY MEDICINE

## 2022-03-16 PROCEDURE — 83036 HEMOGLOBIN GLYCOSYLATED A1C: CPT | Performed by: FAMILY MEDICINE

## 2022-03-16 PROCEDURE — 36415 COLL VENOUS BLD VENIPUNCTURE: CPT | Performed by: FAMILY MEDICINE

## 2022-03-16 PROCEDURE — 84443 ASSAY THYROID STIM HORMONE: CPT | Performed by: FAMILY MEDICINE

## 2022-03-16 PROCEDURE — 99214 OFFICE O/P EST MOD 30 MIN: CPT | Performed by: FAMILY MEDICINE

## 2022-03-16 ASSESSMENT — PATIENT HEALTH QUESTIONNAIRE - PHQ9
10. IF YOU CHECKED OFF ANY PROBLEMS, HOW DIFFICULT HAVE THESE PROBLEMS MADE IT FOR YOU TO DO YOUR WORK, TAKE CARE OF THINGS AT HOME, OR GET ALONG WITH OTHER PEOPLE: EXTREMELY DIFFICULT
SUM OF ALL RESPONSES TO PHQ QUESTIONS 1-9: 11
SUM OF ALL RESPONSES TO PHQ QUESTIONS 1-9: 11

## 2022-03-16 ASSESSMENT — ANXIETY QUESTIONNAIRES
1. FEELING NERVOUS, ANXIOUS, OR ON EDGE: SEVERAL DAYS
4. TROUBLE RELAXING: SEVERAL DAYS
7. FEELING AFRAID AS IF SOMETHING AWFUL MIGHT HAPPEN: SEVERAL DAYS
2. NOT BEING ABLE TO STOP OR CONTROL WORRYING: SEVERAL DAYS
3. WORRYING TOO MUCH ABOUT DIFFERENT THINGS: SEVERAL DAYS
GAD7 TOTAL SCORE: 7
6. BECOMING EASILY ANNOYED OR IRRITABLE: SEVERAL DAYS
7. FEELING AFRAID AS IF SOMETHING AWFUL MIGHT HAPPEN: SEVERAL DAYS
5. BEING SO RESTLESS THAT IT IS HARD TO SIT STILL: SEVERAL DAYS
GAD7 TOTAL SCORE: 7
GAD7 TOTAL SCORE: 7

## 2022-03-16 NOTE — ASSESSMENT & PLAN NOTE
Patient presents requesting an order for a mechanized scooter.  She is socially limited by her inability to walk.  When outside she has a number of health conditions that put her at a high risk for falling including class III obesity, neuropathy, epilepsy.  She also struggles with exertion following a pulmonary embolism.  We discussed that she should continue to focus on strengthening her legs and improving her stamina and that for some individuals use of scooter results in decreased function.  She states that she wants it for very specific use case which includes ambulation outside of her home.  She has a wheelchair which has been ordered by past providers.  Up until now, her  has been able to push her so that she is able to get outside of her house.  However, he has developed a bad back and is struggling to support her in this way.  Given her proposed use case, I think it is reasonable to consider a mobility scooter as an intervention to both ensure safety when she is outside of her home as well as improve the quality of her life.  An order was placed.

## 2022-03-16 NOTE — PROGRESS NOTES
Assessment/Plan:    Hypothyroidism  No recent thyroid measurements.  Check TSH.    Physical deconditioning  Patient presents requesting an order for a mechanized scooter.  She is socially limited by her inability to walk.  When outside she has a number of health conditions that put her at a high risk for falling including class III obesity, neuropathy, epilepsy.  She also struggles with exertion following a pulmonary embolism.  We discussed that she should continue to focus on strengthening her legs and improving her stamina and that for some individuals use of scooter results in decreased function.  She states that she wants it for very specific use case which includes ambulation outside of her home.  She has a wheelchair which has been ordered by past providers.  Up until now, her  has been able to push her so that she is able to get outside of her house.  However, he has developed a bad back and is struggling to support her in this way.  Given her proposed use case, I think it is reasonable to consider a mobility scooter as an intervention to both ensure safety when she is outside of her home as well as improve the quality of her life.  An order was placed.    BMI 50.0-59.9, adult (H)  We briefly reviewed strategies for weight loss.  She is on some medicines that probably have weight gain as a side effect.  She is never tried a structured program.  She will return to clinic for a 40-minute consultation at some point to review whether or not she is a candidate for pharmaceutical options and what strategies she can employ to improve nutrition.        No follow-ups on file.    Jose Maria Morin MD  _______________________________    Chief Complaint   Patient presents with     Orders     Need an order to get a mobility scooter and also need a chair that lifts to help pt get up     Cough     f/u from visit with pulmonologist yesterday     Subjective: Valentina Olmedo is a 62 year old year old female who returns  to clinic for the following chronic complaints/concerns:     This patient presents to discuss whether or not she may be a candidate for mobility scooter.  She has struggled with mobility for quite some time.  She notes that she has a number of reasons including a history of falling with subsequent compression fracture.  She has a history of osteoporosis.  She outlines a goal of being able to enjoy the surroundings in the area adjacent to her home.  Her  is no longer able to push her as he has developed symptoms of back pain.  Her wheelchair and body habitus are difficult for him to push.  She has a history of neuropathy and does not feel well with her feet.  Because of body habitus she is unable to see the ground as she walks.  In the past she has done physical therapy with a focus on conditioning.  She states that she fully intends to continue to be ambulatory in her home and as she transitions from the car to local businesses (which often have a mobility scooter available for her use).  She also notes that she becomes short of breath easily; a condition which started after she had a pulmonary embolism.    History of Present Illness       Mental Health Follow-up:                    Today's PHQ-9         PHQ-9 Total Score: 11  PHQ-9 Q9 Thoughts of better off dead/self-harm past 2 weeks :   Several days  Thoughts of suicide or self harm: (P) No  Self-harm Plan:     Self-harm Action:       Safety concerns for self or others: (P) Yes    How difficult have these problems made it for you to do your work, take care of things at home, or get along with other people: Extremely difficult    Today's RUY-7 Score: 7    Reason for visit:  Meet and talk to my new doctor  Symptom onset:  More than a month  Symptoms include:  Not being able to walk freely  Symptom intensity:  Severe  Symptom progression:  Staying the same  Had these symptoms before:  Yes  Has tried/received treatment for these symptoms:  No  What makes it  "worse:  Being in a wheelchair and not being able to walk freely  What makes it better:  I don t know. I want to be free more.    She eats 0-1 servings of fruits and vegetables daily.She consumes 1 sweetened beverage(s) daily.She exercises with enough effort to increase her heart rate 9 or less minutes per day.  She exercises with enough effort to increase her heart rate 3 or less days per week.   She is taking medications regularly.      Review of Systems   Constitutional:        Review of systems negative except as noted in the HPI.   All other systems reviewed and are negative.       Reviewed history:                 Objective:    height is 1.689 m (5' 6.5\") and weight is 158.9 kg (350 lb 4.8 oz) (abnormal). Her blood pressure is 132/88 and her pulse is 92. Her oxygen saturation is 95%.   Physical Exam  Nursing note reviewed.   Constitutional:       General: She is not in acute distress.     Appearance: Normal appearance. She is obese. She is not ill-appearing.      Comments: Wheelchair   HENT:      Head: Normocephalic and atraumatic.   Eyes:      Extraocular Movements: Extraocular movements intact.      Conjunctiva/sclera: Conjunctivae normal.   Pulmonary:      Effort: Pulmonary effort is normal.   Neurological:      Mental Status: She is alert and oriented to person, place, and time.   Psychiatric:         Attention and Perception: Attention normal.         Mood and Affect: Mood normal.         Speech: Speech normal.         Thought Content: Thought content normal.       PHQ 3/16/2022   PHQ-9 Total Score 11   Q9: Thoughts of better off dead/self-harm past 2 weeks Several days   F/U: Thoughts of suicide or self-harm No   F/U: Safety concerns Yes     RUY-7 SCORE 3/16/2022   Total Score 7 (mild anxiety)   Total Score 7     No flowsheet data found.  No flowsheet data found.  Recent Results (from the past 48 hour(s))   General PFT Lab (Please always keep checked)    Collection Time: 03/15/22  9:46 AM   Result Value " Ref Range    FVC-Pred 3.36 L    FVC-Pre 2.78 L    FVC-%Pred-Pre 82 %    FEV1-Pre 2.25 L    FEV1-%Pred-Pre 85 %    FEV1FVC-Pred 79 %    FEV1FVC-Pre 81 %    FEFMax-Pred 6.55 L/sec    FEFMax-Pre 5.28 L/sec    FEFMax-%Pred-Pre 80 %    FEF2575-Pred 2.33 L/sec    FEF2575-Pre 2.19 L/sec    UWQ5665-%Pred-Pre 94 %    ExpTime-Pre 7.60 sec    FIFMax-Pre 2.45 L/sec    VC-Pred 3.50 L    VC-Pre 3.16 L    VC-%Pred-Pre 90 %    IC-Pred 3.57 L    IC-Pre 2.90 L    IC-%Pred-Pre 81 %    ERV-Pred -0.07 L    ERV-Pre 0.26 L    ERV-%Pred-Pre -393 %    FEV1FEV6-Pred 81 %    FEV1FEV6-Pre 81 %    FRCPleth-Pred 2.82 L    FRCPleth-Pre 1.97 L    FRCPleth-%Pred-Pre 69 %    RVPleth-Pred 2.01 L    RVPleth-Pre 1.71 L    RVPleth-%Pred-Pre 84 %    TLCPleth-Pred 5.27 L    TLCPleth-Pre 4.87 L    TLCPleth-%Pred-Pre 92 %    DLCOunc-Pred 21.54 ml/min/mmHg    DLCOunc-Pre 20.41 ml/min/mmHg    DLCOunc-%Pred-Pre 94 %    DLCOcor-Pre 19.78 ml/min/mmHg    DLCOcor-%Pred-Pre 91 %    VA-Pre 4.55 L    VA-%Pred-Pre 87 %    FEV1SVC-Pred 75 %    FEV1SVC-Pre 71 %   Hemoglobin POCT, Enter/Edit Result    Collection Time: 03/15/22 10:24 AM   Result Value Ref Range    Hemoglobin POCT 14.5 g/dL   INR point of care    Collection Time: 03/16/22  3:36 PM   Result Value Ref Range    INR 3.7 (H) 0.9 - 1.1     No results found for this visit on 03/16/22.    This note has been dictated using voice recognition software. Any grammatical or context distortions are unintentional and inherent to the software

## 2022-03-16 NOTE — PROGRESS NOTES
ANTICOAGULATION MANAGEMENT     Valentina Olmedo 62 year old female is on warfarin with supratherapeutic INR result. (Goal INR 2.0-3.0)    Recent labs: (last 7 days)     03/16/22  1536   INR 3.7*       ASSESSMENT       Source(s): Chart Review and Patient/Caregiver Call       Warfarin doses taken: Warfarin taken as instructed    Diet: No new diet changes identified    New illness, injury, or hospitalization: No    Medication/supplement changes: None noted    Signs or symptoms of bleeding or clotting: No    Previous INR: Therapeutic last 2(+) visits    Additional findings: None       PLAN     Recommended plan for no diet, medication or health factor changes affecting INR     Dosing Instructions: Hold dose then Decrease your warfarin dose (11.8% change) with next INR in 2 weeks       Summary  As of 3/16/2022    Full warfarin instructions:  3/16: Hold; Otherwise 15 mg every Mon; 10 mg all other days   Next INR check:  3/30/2022             Telephone call with Valentina who verbalizes understanding and agrees to plan    Lab visit scheduled    Education provided: None required    Plan made per [facility] anticoagulation protocol    Gustavo Gamez RN  Anticoagulation Clinic  3/16/2022    _______________________________________________________________________     Anticoagulation Episode Summary     Current INR goal:  2.0-3.0   TTR:  48.7 % (1 y)   Target end date:  Indefinite   Send INR reminders to:  [facility]    Indications    History of pulmonary embolism [Z86.711]  Pulmonary embolism with infarction (H) [I26.99]           Comments:           Anticoagulation Care Providers     Provider Role Specialty Phone number    Promise Vanegas MD Referring Family Medicine [phone]    Donald Rooney MD Referring Internal Medicine [phone]

## 2022-03-16 NOTE — PROGRESS NOTES
"Assessment/Plan:    No problem-specific Assessment & Plan notes found for this encounter.        No follow-ups on file.    Jose Maria Morin MD  _______________________________    Chief Complaint   Patient presents with     Orders     Need an order to get a mobility scooter and also need a chair that lifts to help pt get up     Cough     f/u from visit with pulmonologist yesterday     Subjective: Valentina Olmedo is a 62 year old year old female who returns to clinic for the following chronic complaints/concerns:     Wheel chair bound    - she is intersted in scooter or mobilized wheel chair to imporove her mobiltiy.  Spouse struggles to push her.  \"I like    - \"I am in a wheel chair because I broke my back.\"     - she struggles to walk   - history of compression fracture   - working on walking.     - goal: \"I don't want to be in a scooter.\"   -  is struggling to \"do all the work.\"  Bad back.  Struggles to push wheel chair.  He states that she can walk short distances.  She has to stop and rest ~20 yards.     - struggles with balance.   -   - no seizures since 2001.      History of Present Illness       Mental Health Follow-up:                    Today's PHQ-9         PHQ-9 Total Score: 11  PHQ-9 Q9 Thoughts of better off dead/self-harm past 2 weeks :   Several days  Thoughts of suicide or self harm: (P) No  Self-harm Plan:     Self-harm Action:       Safety concerns for self or others: (P) Yes    How difficult have these problems made it for you to do your work, take care of things at home, or get along with other people: Extremely difficult    Today's RUY-7 Score: 7    Reason for visit:  Meet and talk to my new doctor  Symptom onset:  More than a month  Symptoms include:  Not being able to walk freely  Symptom intensity:  Severe  Symptom progression:  Staying the same  Had these symptoms before:  Yes  Has tried/received treatment for these symptoms:  No  What makes it worse:  Being in a wheelchair and not " "being able to walk freely  What makes it better:  I don t know. I want to be free more.    She eats 0-1 servings of fruits and vegetables daily.She consumes 1 sweetened beverage(s) daily.She exercises with enough effort to increase her heart rate 9 or less minutes per day.  She exercises with enough effort to increase her heart rate 3 or less days per week.   She is taking medications regularly.      Review of Systems     Reviewed history:                 Objective:    height is 1.689 m (5' 6.5\") and weight is 158.9 kg (350 lb 4.8 oz) (abnormal). Her blood pressure is 132/88 and her pulse is 92. Her oxygen saturation is 95%.   Physical Exam        PHQ 3/16/2022   PHQ-9 Total Score 11   Q9: Thoughts of better off dead/self-harm past 2 weeks Several days   F/U: Thoughts of suicide or self-harm No   F/U: Safety concerns Yes   Some encounter information is confidential and restricted. Go to Review Flowsheets activity to see all data.     RUY-7 SCORE 3/16/2022   Total Score 7 (mild anxiety)   Total Score 7   Some encounter information is confidential and restricted. Go to Review Flowsheets activity to see all data.     No flowsheet data found.  No flowsheet data found.  Recent Results (from the past 48 hour(s))   General PFT Lab (Please always keep checked)    Collection Time: 03/15/22  9:46 AM   Result Value Ref Range    FVC-Pred 3.36 L    FVC-Pre 2.78 L    FVC-%Pred-Pre 82 %    FEV1-Pre 2.25 L    FEV1-%Pred-Pre 85 %    FEV1FVC-Pred 79 %    FEV1FVC-Pre 81 %    FEFMax-Pred 6.55 L/sec    FEFMax-Pre 5.28 L/sec    FEFMax-%Pred-Pre 80 %    FEF2575-Pred 2.33 L/sec    FEF2575-Pre 2.19 L/sec    OVK9044-%Pred-Pre 94 %    ExpTime-Pre 7.60 sec    FIFMax-Pre 2.45 L/sec    VC-Pred 3.50 L    VC-Pre 3.16 L    VC-%Pred-Pre 90 %    IC-Pred 3.57 L    IC-Pre 2.90 L    IC-%Pred-Pre 81 %    ERV-Pred -0.07 L    ERV-Pre 0.26 L    ERV-%Pred-Pre -393 %    FEV1FEV6-Pred 81 %    FEV1FEV6-Pre 81 %    FRCPleth-Pred 2.82 L    FRCPleth-Pre 1.97 L    " FRCPleth-%Pred-Pre 69 %    RVPleth-Pred 2.01 L    RVPleth-Pre 1.71 L    RVPleth-%Pred-Pre 84 %    TLCPleth-Pred 5.27 L    TLCPleth-Pre 4.87 L    TLCPleth-%Pred-Pre 92 %    DLCOunc-Pred 21.54 ml/min/mmHg    DLCOunc-Pre 20.41 ml/min/mmHg    DLCOunc-%Pred-Pre 94 %    DLCOcor-Pre 19.78 ml/min/mmHg    DLCOcor-%Pred-Pre 91 %    VA-Pre 4.55 L    VA-%Pred-Pre 87 %    FEV1SVC-Pred 75 %    FEV1SVC-Pre 71 %   Hemoglobin POCT, Enter/Edit Result    Collection Time: 03/15/22 10:24 AM   Result Value Ref Range    Hemoglobin POCT 14.5 g/dL     No results found for this visit on 03/16/22.    This note has been dictated using voice recognition software. Any grammatical or context distortions are unintentional and inherent to the software

## 2022-03-16 NOTE — ASSESSMENT & PLAN NOTE
We briefly reviewed strategies for weight loss.  She is on some medicines that probably have weight gain as a side effect.  She is never tried a structured program.  She will return to clinic for a 40-minute consultation at some point to review whether or not she is a candidate for pharmaceutical options and what strategies she can employ to improve nutrition.

## 2022-03-17 ASSESSMENT — ANXIETY QUESTIONNAIRES: GAD7 TOTAL SCORE: 7

## 2022-03-24 NOTE — PROGRESS NOTES
PULMONARY OUTPATIENT CONSULT NOTE        Assessment:      1. Dyspnea  Secondary to significant deconditioning, body habitus   Tobacco user, 1/2 ppd for 45 years, currently 1 to 3 cig per day.   PFT showed normal spirometry, lung volumes and diffusion capacity.   Continue albuterol HFA as needed. Referral to physical therapy.   2. Cough  Recent cold symptoms.   Reports allergic rhinitis symptoms, continue nasal steroid  Acid reflux under control with PPI.   3. Allergic rhinitis   Nasal steroid  4. GERD  Avoid eating close to bedtime. Raise the head of the bed  Continue omeprazole daily   5. Sleep breathing disorder  Disrupted sleep, goes to bed between 10 to 10:30 PM, falls sleep between midnight to 1 AM, gets up at 10 AM, no naps.   Diagnosed with sleep apnea in 2015, sleep study done on 3/26/2015 showed decrease sleep time, AHI 1.8, per sleep physician, underestimated secondary to no stage REM sleep, significant hypoxia was note present. Patient was prescribed with CPAP , but she is not using it.   Sleep hygiene was discussed, attempt to use CPAP therapy.  6. Physical deconditioning   Referral to physical therapy  7. Tobacco user  Smoking cessation counseling  8. Morbid obesity     Plan:      1. Continue albuterol HFA as needed  2. Smoking cessation counseling  3. Flonase one spray each nostril twice a day   4. Resume CPAP therapy  5. Avoid eating close to bedtime  6. Raise the head of the bed  7. Continue omeprazole daily   8. Referral to physical therapy  9. Follow up 6 months         Sen Balderas  Pulmonary / Critical Care  March 15, 2022        CC:     Chief Complaint   Patient presents with     Consult     COPD       HPI:         Valentina Olmedo is a 62 year old female who presents for evaluation dyspnea.  Patient has history of HTN, hypothyroidism, GERD, schizoaffective disorder, anxiety disorder, PTSD, history of pulmonary embolism anticoagulated, CHRIS, obesity, tobacco user.   Reports  exertional dyspnea. Limited mobility, uses walker in short distance, here in a wheelchair.   Able to walk < 1/3 block using walker. Reports mild dry cough, occasional wheezes.   Denies history of asthma, reports tobacco use 1/2 ppd for 45 years, currently 1 or 2 or 3 cig per day.   Patient was prescribed with LABA inhaler (SEREVENT). Denies significant changes in respiratory symptoms.   Denies chest pain, orthopnea or PND, no swelling of LEs.   Denies fever, chills or night sweats.   Reports postnasal drip, nasal congestion, denies headaches, or sinus tenderness.   Acid reflux symptoms under control with PPI.  Reports disrupted sleep, goes to bed between 10 to 10:30 PM, falls sleep between midnight to 1 AM, gets up at 10 AM, no naps. Diagnosed with sleep apnea in 2015, sleep study done on 3/26/2015 showed decrease sleep time, AHI 1.8, per sleep physician, underestimated secondary to no stage REM sleep, significant hypoxia was note present. Patient was prescribed with CPAP , but she is not using it.      Past Medical History :     Past Medical History:   Diagnosis Date     Adrenal mass (H) 10/12/2015    Chen Null MD  They were incidentally discovered on the CAT scan of the abdomen from December 2011 which was done for evaluation of kidney stones. F/up CT of the abdomen done on 6/26/12 showed a stable 5 mm nodular opacity in the right lower lung lobe, adjacent to the diagram. 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  According to the patient, the tumor was benign. She is not sure whether or not the tumor belonged to the thyroid gland. The surgery was done here at the [facility]. In January 2011, she was diagnosed with kidney stones. She was found to have an elevated calcium level of 11.7, with a PTH level of 368. Stone an     Primary hyperparathyroidism (H)            PTSD (post-traumatic stress disorder)      Pulmonary embolism with infarction (H) 1/12/2021     Recurrent otitis media      Seizure disorder (H)      Stroke (H)      Vertigo           Medications:     @       Social History :     Social History     Socioeconomic History     Marital status:      Spouse name: Not on file     Number of children: Not on file     Years of education: Not on file     Highest education level: Not on file   Occupational History     Not on file   Tobacco Use     Smoking status: Current Some Day Smoker     Packs/day: 0.25     Years: 35.00     Pack years: 8.75     Types: Cigarettes     Start date: 6/10/1974     Smokeless tobacco: Never Used     Tobacco comment: smoking about 4 cig per day   Vaping Use     Vaping Use: Never used   Substance and Sexual Activity     Alcohol use: No     Drug use: No     Sexual activity: Not Currently   Other Topics Concern     Not on file   Social History Narrative     Not on file     Social Determinants of Health     Financial Resource Strain: Not on file   Food Insecurity: Not on file   Transportation Needs: Not on file   Physical Activity: Not on file   Stress: Not on file   Social Connections: Not on file   Intimate Partner Violence: Not on file   Housing Stability: Not on file          Family History :     Family History   Problem Relation Age of Onset     Lung Cancer Maternal Grandfather 76.00     Chronic Obstructive Pulmonary Disease Mother      Other - See Comments Father         estranged     Diabetes Sister      Depression Sister      Alcoholism Sister      Other - See Comments Brother          Missing since 1992     Alcoholism Brother      No Known Problems Sister         Half sister       Review of Systems  A 12 point comprehensive review of systems was negative except as noted.        Objective:     /80 (BP Location: Right arm, Patient Position: Chair, Cuff Size: Adult Large)   Pulse 68   Resp 18   LMP  (LMP Unknown)   SpO2 96%   Patient oxygen saturation on RA at rest is 94%  Oxygen saturation after ambulating 150ft on RA is 93%    Gen: awake, alert, no distress  HEENT: pink conjunctiva, moist mucosa, Mallampati II/IV  Neck: no thyromegaly, masses or JVD  Lungs: clear  CV: regular, no murmurs or gallops appreciated  Abdomen: soft, NT, BS wnl  Ext: no edema  Neuro: CN II-XII intact, non focal      Diagnostic tests:         PFTs:     FVC  2.78 L (82%)  FEV1 2.25 L (85%)  FEV1/FVC 81  TLC 4.87 L (92%)  RV 1.71 L (84%)  DLCO 94%     Sleep study:      Sleep study done on 3/26/2015 showed decrease sleep time, AHI 1.8, per sleep physician, underestimated secondary to no stage REM sleep, significant hypoxia was note present.     IMAGES:     CT CHEST PULMONARY EMBOLISM W CONTRAST  LOCATION: [facility]  DATE/TIME: 8/31/2021 2:17 PM   INDICATION: Dyspnea, chronic, unclear etiology.  COMPARISON: None.  FINDINGS:  ANGIOGRAM CHEST: Pulmonary arteries are normal caliber and negative for pulmonary emboli. Thoracic aorta is negative for dissection or aneurysm.  LUNGS AND PLEURA: Minimal atelectasis or scarring. Otherwise, lungs are clear. No significant airway thickening or bronchiectasis. No pleural effusion or pneumothorax.  MEDIASTINUM/AXILLAE: No adenopathy. No pericardial effusion. Small hiatus hernia.  CORONARY ARTERY CALCIFICATION: None.  UPPER ABDOMEN: Hepatic steatosis. Stable 2.5 cm left adrenal adenoma requiring no routine follow-up.  MUSCULOSKELETAL: Normal.  IMPRESSION:  1.  No findings to explain symptoms.  2.  No pulmonary embolism.  3.  Hepatic steatosis.  4.  Small  hiatus hernia.     XR CHEST 2 VW  LOCATION: [facility]  DATE/TIME: 12/9/2021 10:34 AM  INDICATION: Chronic cough and left chest wall pain. History of COPD.  COMPARISON: CT chest 8/31/2021 and one view chest 9/9/2019  IMPRESSION: Heart size and vascularity are normal. Focal subpleural airspace opacities within the left mid lung and lower lobe suggesting pneumonia. No pneumothorax nor pleural effusion.  Follow-up radiograph should be suggested to ensure resolution.

## 2022-03-30 ENCOUNTER — ANTICOAGULATION THERAPY VISIT (OUTPATIENT)
Dept: ANTICOAGULATION | Facility: CLINIC | Age: 62
End: 2022-03-30

## 2022-03-30 ENCOUNTER — LAB (OUTPATIENT)
Dept: LAB | Facility: CLINIC | Age: 62
End: 2022-03-30
Payer: MEDICARE

## 2022-03-30 DIAGNOSIS — J30.2 SEASONAL ALLERGIC RHINITIS, UNSPECIFIED TRIGGER: Primary | ICD-10-CM

## 2022-03-30 DIAGNOSIS — I26.99 PULMONARY EMBOLISM WITH INFARCTION (H): ICD-10-CM

## 2022-03-30 DIAGNOSIS — Z86.711 HISTORY OF PULMONARY EMBOLISM: Primary | ICD-10-CM

## 2022-03-30 LAB — INR BLD: 2.9 (ref 0.9–1.1)

## 2022-03-30 PROCEDURE — 85610 PROTHROMBIN TIME: CPT

## 2022-03-30 PROCEDURE — 36416 COLLJ CAPILLARY BLOOD SPEC: CPT

## 2022-03-30 NOTE — PROGRESS NOTES
ANTICOAGULATION MANAGEMENT     Valentina Olmedo 62 year old female is on warfarin with therapeutic INR result. (Goal INR 2.0-3.0)    Recent labs: (last 7 days)     03/30/22  1333   INR 2.9*       ASSESSMENT       Source(s): Chart Review, Patient/Caregiver Call and Template       Warfarin doses taken: Warfarin recently held for 1 day which may be affecting INR    Held warfarin dose on 3/16.    Diet: No new diet changes identified    New illness, injury, or hospitalization: Yes:   Reported an ongoing dry cough (non-productive) she denies any fever or sore throat.  (She has been a smoker since age 16, and now still smokes about 3 cigarettes per day.)   Has been constipated.and feels Miralax is not working  She feels OK now.    Medication/supplement changes: None noted    Signs or symptoms of bleeding or clotting: No    Previous INR: Supratherapeutic at 3.7 on 3/16/22.    Additional findings: None     [facility]ed to [facility] dial 1 first, then 1- [phone]    Then called nurse Dax Stoddard cell# [phone]       PLAN     Recommended plan for ongoing change(s) affecting INR     Dosing Instructions:   (evenings. 5mg tabs)    continue your current warfarin dose with next INR in 2 weeks       Summary  As of 3/30/2022    Full warfarin instructions:  15 mg every Mon; 10 mg all other days   Next INR check:  4/13/2022             Telephone call with  Valentina ([phone]) who verbalizes understanding and agrees to plan    - advised Valentina to send [employer] message in regards to her complaints as mentioned above.    Lab visit scheduled - INR on 4/13/22 @ [facility]    Education provided: Importance of consistent vitamin K intake, Goal range and significance of current result and Importance of notifying clinic for diarrhea, nausea/vomiting, reduced intake, and/or illness; a sooner lab recheck maybe needed.    Plan made per [facility] anticoagulation protocol    Shonna Ernandez RN  Anticoagulation  Clinic  3/30/2022    _______________________________________________________________________     Anticoagulation Episode Summary     Current INR goal:  2.0-3.0   TTR:  46.3 % (1 y)   Target end date:  Indefinite   Send INR reminders to:  [facility]    Indications    History of pulmonary embolism [Z86.711]  Pulmonary embolism with infarction (H) [I26.99]           Comments:           Anticoagulation Care Providers     Provider Role Specialty Phone number    Promise Vanegas MD Referring Family Medicine [phone]    Donald Rooney MD Referring Internal Medicine [phone]

## 2022-04-01 RX ORDER — FLUTICASONE PROPIONATE 50 MCG
SPRAY, SUSPENSION (ML) NASAL
Qty: 16 G | Refills: 4 | Status: SHIPPED | OUTPATIENT
Start: 2022-04-01 | End: 2023-03-21

## 2022-04-01 NOTE — TELEPHONE ENCOUNTER
"Routing refill request to provider for review/approval because:  Early refill request     Last Written Prescription Date:  5/6/21  Last Fill Quantity: 16g,  # refills: 12   Last office visit provider:  3/16/22         Requested Prescriptions   Pending Prescriptions Disp Refills     fluticasone (FLONASE) 50 MCG/ACT nasal spray [Pharmacy Med Name: Fluticasone Propionate 50MCG/ACT SUSP] 16 g      Sig: INHALE 1 SPRAY IN EACH NOSTRIL DAILY       Nasal Allergy Protocol Passed - 3/30/2022 10:00 AM        Passed - Patient is age 12 or older        Passed - Recent (12 mo) or future (30 days) visit within the authorizing provider's specialty     Patient has had an office visit with the authorizing provider or a provider within the authorizing providers department within the previous 12 mos or has a future within next 30 days. See \"Patient Info\" tab in inHotchalket, or \"Choose Columns\" in Meds & Orders section of the refill encounter.              Passed - Medication is active on med list         Refused Prescriptions Disp Refills     omeprazole (PRILOSEC) 20 MG DR capsule [Pharmacy Med Name: Omeprazole 20MG CPDR] 56 capsule      Sig: TAKE 1 CAPSULE (20 MG) BY MOUTH TWICE A DAY BEFORE MEALS       PPI Protocol Failed - 3/30/2022 10:00 AM        Failed - No diagnosis of osteoporosis on record        Passed - Not on Clopidogrel (unless Pantoprazole ordered)        Passed - Recent (12 mo) or future (30 days) visit within the authorizing provider's specialty     Patient has had an office visit with the authorizing provider or a provider within the authorizing providers department within the previous 12 mos or has a future within next 30 days. See \"Patient Info\" tab in inbasket, or \"Choose Columns\" in Meds & Orders section of the refill encounter.              Passed - Medication is active on med list        Passed - Patient is age 18 or older        Passed - No active pregnacy on record        Passed - No positive pregnancy test in " past 12 months             Zulema Camejo RN 04/01/22 10:55 AM

## 2022-04-01 NOTE — TELEPHONE ENCOUNTER
Denied Rx, patient should already have refills on file at the same pharmacy requested.         Zulema Camejo RN, BSN Nurse Triage Advisor 10:54 AM 4/1/2022

## 2022-04-05 NOTE — TELEPHONE ENCOUNTER
" Disp Refills Start End ISABEL   omeprazole (PRILOSEC) 20 MG capsule 180 capsule 3 7/1/2021  No   Sig - Route: Take 1 capsule (20 mg total) by mouth 2 (two) times a day before meals. - Oral   Sent to pharmacy as: omeprazole 20 mg capsule,delayed release (PriLOSEC)   E-Prescribing Status: Receipt confirmed by pharmacy (7/1/2021  4:25 PM CDT)       omeprazole (PRILOSEC) 20 MG capsule [[id number]]    Electronically signed by: Promise Vanegas MD on 07/01/21 1625 Status: Active   Ordering user: Promise Vanegas MD 07/01/21 1625 Authorized by: Promise Vanegas MD   Frequency: BID AC 07/01/21 - Until Discontinued   Diagnoses  Gastroesophageal reflux disease without esophagitis [K21.9]     Per [facility] Epic        Routing refill request to provider for review/approval because:  Patient should still have refills on file    Last Written Prescription Date:  7/1/21  Last Fill Quantity: 180,  # refills: 3   Last office visit provider:  3/16/22     Requested Prescriptions   Pending Prescriptions Disp Refills     omeprazole (PRILOSEC) 20 MG DR capsule [Pharmacy Med Name: Omeprazole 20MG CPDR] 56 capsule      Sig: TAKE 1 CAPSULE (20 MG) BY MOUTH TWICE A DAY BEFORE MEALS       PPI Protocol Failed - 4/4/2022  2:33 PM        Failed - No diagnosis of osteoporosis on record        Passed - Not on Clopidogrel (unless Pantoprazole ordered)        Passed - Recent (12 mo) or future (30 days) visit within the authorizing provider's specialty     Patient has had an office visit with the authorizing provider or a provider within the authorizing providers department within the previous 12 mos or has a future within next 30 days. See \"Patient Info\" tab in inbasket, or \"Choose Columns\" in Meds & Orders section of the refill encounter.              Passed - Medication is active on med list        Passed - Patient is age 18 or older        Passed - No active pregnacy on record        Passed - No positive pregnancy test in past 12 months       "       Mary Alas RN 04/05/22 3:35 PM

## 2022-04-07 ENCOUNTER — OFFICE VISIT (OUTPATIENT)
Dept: FAMILY MEDICINE | Facility: CLINIC | Age: 62
End: 2022-04-07
Payer: MEDICARE

## 2022-04-07 ENCOUNTER — MYC MEDICAL ADVICE (OUTPATIENT)
Dept: FAMILY MEDICINE | Facility: CLINIC | Age: 62
End: 2022-04-07

## 2022-04-07 ENCOUNTER — ANTICOAGULATION THERAPY VISIT (OUTPATIENT)
Dept: ANTICOAGULATION | Facility: CLINIC | Age: 62
End: 2022-04-07

## 2022-04-07 VITALS
RESPIRATION RATE: 16 BRPM | BODY MASS INDEX: 45.99 KG/M2 | WEIGHT: 293 LBS | SYSTOLIC BLOOD PRESSURE: 122 MMHG | OXYGEN SATURATION: 98 % | HEIGHT: 67 IN | HEART RATE: 84 BPM | DIASTOLIC BLOOD PRESSURE: 90 MMHG | TEMPERATURE: 98.2 F

## 2022-04-07 DIAGNOSIS — H61.23 BILATERAL IMPACTED CERUMEN: ICD-10-CM

## 2022-04-07 DIAGNOSIS — J30.2 SEASONAL ALLERGIC RHINITIS, UNSPECIFIED TRIGGER: ICD-10-CM

## 2022-04-07 DIAGNOSIS — J44.9 CHRONIC OBSTRUCTIVE PULMONARY DISEASE, UNSPECIFIED COPD TYPE (H): ICD-10-CM

## 2022-04-07 DIAGNOSIS — N30.01 ACUTE CYSTITIS WITH HEMATURIA: Primary | ICD-10-CM

## 2022-04-07 DIAGNOSIS — Z86.711 HISTORY OF PULMONARY EMBOLISM: Primary | ICD-10-CM

## 2022-04-07 DIAGNOSIS — I26.99 PULMONARY EMBOLISM WITH INFARCTION (H): ICD-10-CM

## 2022-04-07 LAB
ALBUMIN UR-MCNC: 30 MG/DL
ANION GAP SERPL CALCULATED.3IONS-SCNC: 11 MMOL/L (ref 5–18)
APPEARANCE UR: ABNORMAL
BACTERIA #/AREA URNS HPF: ABNORMAL /HPF
BILIRUB UR QL STRIP: NEGATIVE
BUN SERPL-MCNC: 13 MG/DL (ref 8–22)
CALCIUM SERPL-MCNC: 9.5 MG/DL (ref 8.5–10.5)
CHLORIDE BLD-SCNC: 101 MMOL/L (ref 98–107)
CO2 SERPL-SCNC: 27 MMOL/L (ref 22–31)
COLOR UR AUTO: YELLOW
CREAT SERPL-MCNC: 0.85 MG/DL (ref 0.6–1.1)
GFR SERPL CREATININE-BSD FRML MDRD: 78 ML/MIN/1.73M2
GLUCOSE BLD-MCNC: 119 MG/DL (ref 70–125)
GLUCOSE UR STRIP-MCNC: NEGATIVE MG/DL
HGB UR QL STRIP: ABNORMAL
INR BLD: 3.4 (ref 0.9–1.1)
KETONES UR STRIP-MCNC: NEGATIVE MG/DL
LEUKOCYTE ESTERASE UR QL STRIP: ABNORMAL
NITRATE UR QL: NEGATIVE
PH UR STRIP: 6 [PH] (ref 5–8)
POTASSIUM BLD-SCNC: 3.8 MMOL/L (ref 3.5–5)
RBC #/AREA URNS AUTO: ABNORMAL /HPF
SODIUM SERPL-SCNC: 139 MMOL/L (ref 136–145)
SP GR UR STRIP: 1.02 (ref 1–1.03)
SQUAMOUS #/AREA URNS AUTO: ABNORMAL /LPF
UROBILINOGEN UR STRIP-ACNC: 0.2 E.U./DL
WBC #/AREA URNS AUTO: ABNORMAL /HPF
WBC CLUMPS #/AREA URNS HPF: PRESENT /HPF

## 2022-04-07 PROCEDURE — 99215 OFFICE O/P EST HI 40 MIN: CPT | Performed by: FAMILY MEDICINE

## 2022-04-07 PROCEDURE — 80048 BASIC METABOLIC PNL TOTAL CA: CPT | Performed by: FAMILY MEDICINE

## 2022-04-07 PROCEDURE — 36416 COLLJ CAPILLARY BLOOD SPEC: CPT | Performed by: FAMILY MEDICINE

## 2022-04-07 PROCEDURE — 36415 COLL VENOUS BLD VENIPUNCTURE: CPT | Performed by: FAMILY MEDICINE

## 2022-04-07 PROCEDURE — 85610 PROTHROMBIN TIME: CPT | Performed by: FAMILY MEDICINE

## 2022-04-07 PROCEDURE — 81001 URINALYSIS AUTO W/SCOPE: CPT | Performed by: FAMILY MEDICINE

## 2022-04-07 PROCEDURE — 87086 URINE CULTURE/COLONY COUNT: CPT | Performed by: FAMILY MEDICINE

## 2022-04-07 RX ORDER — FEXOFENADINE HCL 180 MG/1
180 TABLET ORAL DAILY
Qty: 30 TABLET | Refills: 1 | Status: SHIPPED | OUTPATIENT
Start: 2022-04-07 | End: 2022-05-25

## 2022-04-07 RX ORDER — NITROFURANTOIN 25; 75 MG/1; MG/1
100 CAPSULE ORAL 2 TIMES DAILY
Qty: 10 CAPSULE | Refills: 0 | Status: SHIPPED | OUTPATIENT
Start: 2022-04-07 | End: 2022-04-12

## 2022-04-07 NOTE — PROGRESS NOTES
ANTICOAGULATION MANAGEMENT     Valentina Olmedo 62 year old female is on warfarin with supratherapeutic INR result. (Goal INR 2.0-3.0)    Recent labs: (last 7 days)     04/07/22  1232   INR 3.4*       ASSESSMENT       Source(s): Chart Review, Patient/Caregiver Call and Template       Warfarin doses taken: Warfarin taken as instructed    Diet: No new diet changes identified    New illness, injury, or hospitalization: Possible UTI - starting macrobid    Medication/supplement changes: Allegra started on 4/7 No interaction anticipated    Macrobid 5 day course (dates: 4/7-4/12) No interaction anticipated    Signs or symptoms of bleeding or clotting: No    Previous INR: Therapeutic last visit; previously outside of goal range    Additional findings: [facility]S faxed to [facility] @ [phone] and VM left for nurse Arlyn with new dosing and next INR check.        PLAN     Recommended plan for no diet, medication or health factor changes affecting INR     Dosing Instructions: partial hold then decrease your warfarin dose (6.7% change) with next INR in 1-2 weeks       Summary  As of 4/7/2022    Full warfarin instructions:  4/7: 5 mg; Otherwise 10 mg every day   Next INR check:  4/13/2022             Telephone call with Valentina who verbalizes understanding and agrees to plan  LVM for Arlyn with dosing and next INR date. As well as faxed AVS.     Lab visit scheduled - pt elected to keep previously scheduled appt on 4/13    Education provided: Goal range and significance of current result    Plan made per [facility] anticoagulation protocol    Diana Rodriguez, RN  Anticoagulation Clinic  4/7/2022    _______________________________________________________________________     Anticoagulation Episode Summary     Current INR goal:  2.0-3.0   TTR:  44.6 % (1 y)   Target end date:  Indefinite   Send INR reminders to:  [facility]    Indications    History of pulmonary embolism [Z86.711]  Pulmonary embolism with infarction (H)  [I26.99]           Comments:           Anticoagulation Care Providers     Provider Role Specialty Phone number    Promise Vanegas MD Referring Family Medicine [phone]    Donald Rooney MD Referring Internal Medicine [phone]

## 2022-04-07 NOTE — ASSESSMENT & PLAN NOTE
In review of the EHR, there is been ongoing concerns for COPD.  The patient was told by the pulmonologist earlier this year that she does not have COPD nor asthma.  She tells me she was also told to continue her inhaled corticosteroid as well as her short acting beta agonist.  Symptoms tend to be worse with seasonal changes.  Springtime might be difficult.  Modify antihistamine regimen.  Consider adding montelukast.

## 2022-04-07 NOTE — ASSESSMENT & PLAN NOTE
Patient describes acute urinary retention.  Interestingly, she is quite comfortable.  When asked, she was able to produce a sample.  I suspect bladder infection.  Urinalysis and micro urinalysis consistent with bladder infection.  We will treat starting with Macrobid.  Culture pending.  Given that she describes having not urinated for the past several days we will switch to confirm kidney function with a basic metabolic panel blood draw.

## 2022-04-07 NOTE — PROGRESS NOTES
Assessment/Plan:    Seasonal allergic rhinitis, unspecified trigger  Ongoing symptoms.  She tried various second-generation antihistamine.  Currently on Claritin.  Unclear if it working all that well.  She also does a nasal steroid (Flonase).  She does not recall previously trying fexofenadine.  Trial of Allegra sent to pharmacy.  Consider addition of montelukast (or referral to allergy).  I suspect that some aspect of her nutrition plan may be triggering her allergies in addition to environmental factors.    Chronic obstructive pulmonary disease (H)  In review of the EHR, there is been ongoing concerns for COPD.  The patient was told by the pulmonologist earlier this year that she does not have COPD nor asthma.  She tells me she was also told to continue her inhaled corticosteroid as well as her short acting beta agonist.  Symptoms tend to be worse with seasonal changes.  Springtime might be difficult.  Modify antihistamine regimen.  Consider adding montelukast.    Bilateral impacted cerumen  Nurse performed irrigation resulted in improvement of hearing.  There was a drop of blood in the external auditory canal on the right side.  I suspect that we unroofed the scab from where she had been inserting a Q-tip into her ear.  We will hold off with the referral to ENT as there is not a clear clinical question for the specialty.  Plan to offer irrigation when she is in clinic periodically.    Acute cystitis with hematuria  Patient describes acute urinary retention.  Interestingly, she is quite comfortable.  When asked, she was able to produce a sample.  I suspect bladder infection.  Urinalysis and micro urinalysis consistent with bladder infection.  We will treat starting with Macrobid.  Culture pending.  Given that she describes having not urinated for the past several days we will switch to confirm kidney function with a basic metabolic panel blood draw.    42 minutes spent on the date of the encounter doing chart  "review, history and exam, documentation and further activities per the note    No follow-ups on file.    Jose Maria Morin MD  _______________________________    Chief Complaint   Patient presents with     Cough     Urinary Problem     Unable to urinate for 2-3 days.     Subjective: Valentina Olmedo is a 62 year old year old female who returns to clinic for the following chronic complaints/concerns:     Urinary retention:   - I just dribble.\"     - no previous episodes   - she has feeling like she needs to go to the bathroom   - drinking a lot of fluids.   - UTI?  1/x when pregnant.   - no changes to medications. No fever. No chills.   - BMs: \"so and so.\"    - she has not incontinence.  \"I feel the pressure\" with some urgency.     Hearing:   -  Requests referral to ENT. She saw an audiologist in the past.  She wonders about clearning her ears    - she had some bleeding recently after using a q-tip.        History of Present Illness       Reason for visit:  My reason was was coughing  Symptom onset:  3-7 days ago  Symptoms include:  Now I am having problems going to the bathroom I think I might have an infection  Symptom intensity:  Moderate  Symptom progression:  Worsening  Had these symptoms before:  No  What makes it worse:  I am unsure  What makes it better:  Unsure and I have been drinking and drinking water    She eats 2-3 servings of fruits and vegetables daily.She consumes 1 sweetened beverage(s) daily.She exercises with enough effort to increase her heart rate 10 to 19 minutes per day.  She exercises with enough effort to increase her heart rate 7 days per week.   She is taking medications regularly.      Review of Systems   Constitutional:        Review of systems negative except as noted in the HPI.   All other systems reviewed and are negative.       Reviewed history:    Meds              Objective:    height is 1.689 m (5' 6.5\") and weight is 158.8 kg (350 lb 1 oz) (abnormal). Her oral temperature is 98.2 "  F (36.8  C). Her blood pressure is 122/90 (abnormal) and her pulse is 84. Her respiration is 16 and oxygen saturation is 98%.   Physical Exam  Nursing note reviewed.   Constitutional:       General: She is not in acute distress.     Appearance: Normal appearance. She is obese. She is not ill-appearing.   HENT:      Head: Normocephalic and atraumatic.      Ears:      Comments: The external auditory canals initially were obstructed with cerumen.  After irrigation, there is no cerumen in the external auditory canal.  On the right side, there was 1 drop of blood on the floor.  Tympanic membrane appears gray and glistening bilaterally.  Eyes:      Extraocular Movements: Extraocular movements intact.      Conjunctiva/sclera: Conjunctivae normal.   Pulmonary:      Effort: Pulmonary effort is normal.   Abdominal:      General: There is no distension.      Palpations: Abdomen is soft.      Tenderness: There is no abdominal tenderness.   Neurological:      Mental Status: She is alert and oriented to person, place, and time.   Psychiatric:         Attention and Perception: Attention normal.         Mood and Affect: Mood normal.         Speech: Speech normal.         Thought Content: Thought content normal.             PHQ 3/16/2022   PHQ-9 Total Score 11   Q9: Thoughts of better off dead/self-harm past 2 weeks Several days   F/U: Thoughts of suicide or self-harm No   F/U: Safety concerns Yes     RUY-7 SCORE 3/16/2022   Total Score 7 (mild anxiety)   Total Score 7     No flowsheet data found.  No flowsheet data found.  Recent Results (from the past 48 hour(s))   UA reflex to Microscopic and Culture    Collection Time: 04/07/22 12:02 PM    Specimen: Urine, Clean Catch   Result Value Ref Range    Color Urine Yellow Colorless, Straw, Light Yellow, Yellow    Appearance Urine Slightly Cloudy (A) Clear    Glucose Urine Negative Negative mg/dL    Bilirubin Urine Negative Negative    Ketones Urine Negative Negative mg/dL    Specific  Gravity Urine 1.025 1.005 - 1.030    Blood Urine Moderate (A) Negative    pH Urine 6.0 5.0 - 8.0    Protein Albumin Urine 30  (A) Negative mg/dL    Urobilinogen Urine 0.2 0.2, 1.0 E.U./dL    Nitrite Urine Negative Negative    Leukocyte Esterase Urine Small (A) Negative   Urine Microscopic    Collection Time: 04/07/22 12:02 PM   Result Value Ref Range    Bacteria Urine Moderate (A) None Seen /HPF    RBC Urine 5-10 (A) 0-2 /HPF /HPF    WBC Urine  (A) 0-5 /HPF /HPF    Squamous Epithelials Urine Few (A) None Seen /LPF    WBC Clumps Urine Present (A) None Seen /HPF     No results found for this visit on 04/07/22.    This note has been dictated using voice recognition software. Any grammatical or context distortions are unintentional and inherent to the software

## 2022-04-07 NOTE — ASSESSMENT & PLAN NOTE
Nurse performed irrigation resulted in improvement of hearing.  There was a drop of blood in the external auditory canal on the right side.  I suspect that we unroofed the scab from where she had been inserting a Q-tip into her ear.  We will hold off with the referral to ENT as there is not a clear clinical question for the specialty.  Plan to offer irrigation when she is in clinic periodically.

## 2022-04-07 NOTE — ASSESSMENT & PLAN NOTE
Ongoing symptoms.  She tried various second-generation antihistamine.  Currently on Claritin.  Unclear if it working all that well.  She also does a nasal steroid (Flonase).  She does not recall previously trying fexofenadine.  Trial of Allegra sent to pharmacy.  Consider addition of montelukast (or referral to allergy).  I suspect that some aspect of her nutrition plan may be triggering her allergies in addition to environmental factors.

## 2022-04-08 DIAGNOSIS — K21.00 GASTROESOPHAGEAL REFLUX DISEASE WITH ESOPHAGITIS WITHOUT HEMORRHAGE: Primary | ICD-10-CM

## 2022-04-08 NOTE — TELEPHONE ENCOUNTER
Medication Request  Medication name: omeprazole (PRILOSEC) 20 MG DR capsule  Requested Pharmacy: [facility]  When was patient last seen for this?:  4/7/2022  Patient offered appointment: No  Okay to leave a detailed message: yes

## 2022-04-09 LAB — BACTERIA UR CULT: NORMAL

## 2022-04-13 ENCOUNTER — ANTICOAGULATION THERAPY VISIT (OUTPATIENT)
Dept: ANTICOAGULATION | Facility: CLINIC | Age: 62
End: 2022-04-13

## 2022-04-13 ENCOUNTER — LAB (OUTPATIENT)
Dept: LAB | Facility: CLINIC | Age: 62
End: 2022-04-13
Payer: MEDICARE

## 2022-04-13 DIAGNOSIS — I26.99 PULMONARY EMBOLISM WITH INFARCTION (H): ICD-10-CM

## 2022-04-13 DIAGNOSIS — Z86.711 HISTORY OF PULMONARY EMBOLISM: Primary | ICD-10-CM

## 2022-04-13 LAB — INR BLD: 3.4 (ref 0.9–1.1)

## 2022-04-13 PROCEDURE — 85610 PROTHROMBIN TIME: CPT

## 2022-04-13 PROCEDURE — 36416 COLLJ CAPILLARY BLOOD SPEC: CPT

## 2022-04-13 NOTE — PROGRESS NOTES
ANTICOAGULATION MANAGEMENT     Valentina Olmedo 62 year old female is on warfarin with supratherapeutic INR result. (Goal INR 2.0-3.0)    Recent labs: (last 7 days)     04/13/22  1431   INR 3.4*       ASSESSMENT       Source(s): Chart Review and Patient/Caregiver Call       Warfarin doses taken: Warfarin taken as instructed    Diet: No new diet changes identified    New illness, injury, or hospitalization: Yes: diarrhea for the last few days    Medication/supplement changes: just finished 5 days of macrobid for UTI yesterday. She has taken 1 dose of imodium for her diarrhea and has also taken Pepto Bismol a few times. Taking Allegra for allergies.     Signs or symptoms of bleeding or clotting: No    Previous INR: Supratherapeutic    Additional findings: None       PLAN     Recommended plan for temporary change(s) affecting INR     Dosing Instructions: partial hold then continue your current warfarin dose with next INR in 1 week       Summary  As of 4/13/2022    Full warfarin instructions:  4/13: 5 mg; Otherwise 10 mg every day   Next INR check:  4/20/2022             Telephone call with Valentina who verbalizes understanding and agrees to plan  LDVM for community nurse Arlyn  Faxed AVS to [facility]    Lab visit scheduled    Education provided: Goal range and significance of current result, Potential interaction between warfarin and abx/infection and Monitoring for bleeding signs and symptoms    Plan made per [facility] anticoagulation protocol    Fanta Smith, RN  Anticoagulation Clinic  4/13/2022    _______________________________________________________________________     Anticoagulation Episode Summary     Current INR goal:  2.0-3.0   TTR:  42.9 % (1 y)   Target end date:  Indefinite   Send INR reminders to:  [facility]    Indications    History of pulmonary embolism [Z86.711]  Pulmonary embolism with infarction (H) [I26.99]           Comments:           Anticoagulation Care Providers     Provider Role  Specialty Phone number    Promise Vanegas MD Referring Family Medicine [phone]    Donald Rooney MD Referring Internal Medicine [phone]

## 2022-04-19 ENCOUNTER — MEDICAL CORRESPONDENCE (OUTPATIENT)
Dept: BEHAVIORAL HEALTH | Facility: CLINIC | Age: 62
End: 2022-04-19

## 2022-04-19 ENCOUNTER — VIRTUAL VISIT (OUTPATIENT)
Dept: BEHAVIORAL HEALTH | Facility: CLINIC | Age: 62
End: 2022-04-19
Attending: PSYCHIATRY & NEUROLOGY
Payer: MEDICARE

## 2022-04-19 DIAGNOSIS — R63.5 WEIGHT GAIN: ICD-10-CM

## 2022-04-19 PROCEDURE — 99215 OFFICE O/P EST HI 40 MIN: CPT | Mod: 95 | Performed by: PSYCHIATRY & NEUROLOGY

## 2022-04-19 RX ORDER — TOPIRAMATE 50 MG/1
50 TABLET, FILM COATED ORAL AT BEDTIME
Qty: 90 TABLET | Refills: 1 | Status: SHIPPED | OUTPATIENT
Start: 2022-04-19 | End: 2022-10-04

## 2022-04-19 RX ORDER — TOPIRAMATE 50 MG/1
50 TABLET, FILM COATED ORAL AT BEDTIME
Qty: 30 TABLET | Refills: 4 | Status: SHIPPED | OUTPATIENT
Start: 2022-04-19 | End: 2022-04-19

## 2022-04-19 NOTE — PROGRESS NOTES
This video/telephone visit will be conducted via a call between you and your physician/provider. We have found that certain health care needs can be provided without the need for an in-person physical exam. This service lets us provide the care you need with a video /telephone conversation. If a prescription is necessary we can send it directly to your pharmacy. If lab work is needed we can place an order for that and you can then stop by our lab to have the test done at a later time.    Just as we bill insurance for in-person visits, we also bill insurance for video/telephone visits. If you have questions about your insurance coverage, we recommend that you speak with your insurance company.    Patient has given verbal consent for video/Telephone visit? Yes    Patient would like telephone visit, please connect : [phone]  Reason for visit: Medication follow up  Patient verified allergies, medications and pharmacy via telephone.     RUY-7 scores:    RUY-7 SCORE 8/29/2021 3/16/2022   Total Score - 7 (mild anxiety)   Total Score 11 7       PHQ-9 scores:   PHQ-9 SCORE 8/29/2021 3/16/2022   PHQ-9 Total Score MyChart - 11 (Moderate depression)   PHQ-9 Total Score 14 11     [place]  to be reviewed by provider.   Medication refill is pended for review and approval by provider.  Patient states she  is ready for visit.    Regla Storey CMA April 19, 2022 10:20 AM

## 2022-04-19 NOTE — PROGRESS NOTES
"Valentina Olmedo is a 62 year old female who is being evaluated via a billable telemedicine visit.    How would you like to obtain your AVS? AVS Preference: Mail a copy        Medical Decision Makin. Schizoaffective disorder, depressive type (H)  Reports mood stable, denies depression.   Plan:   Continue Seroquel 200 mg nightly, Cymbalta 120 mg daily.      2. PTSD (post-traumatic stress disorder)   Prazosin started in . Helps decrease scary dreams.  Referred to therapy. First appt with psychologist Abbie Diallo on 22.      3. CHRIS (obstructive sleep apnea)  C/o can't tolerate CPAP. Pt reports she called appliance company, waiting to hear back.  Plan: Reviewed 21 note by Kaley Baron MD, [facility].  Pt enc to make appt.     4. High risk medication use  DISCUS score = 0 (done 10/05/21).  Increase Topamax 50 mg nightly to target appetite and weight.    5. Epilepsy  Managed by Vikki Benitez MD, [place] [facility].      Return in about 3 mos.-in-person appointment.         History of Present Illness:   Valentina Olmedo is a 62 y.o. female here for SCAD, bipolar type, PTSD, Neurocognitive disorder, seizure disorder here for f/u.    Pt reports urinary retention, problem falling asleep past 2 nights.      In-home services:  -P[facility]  -Med set up q Monday  -Food from [facility], [facility]    Weekly INR.      SOC: Lives with  René.    Chronic, René. Cat, \"Yosi\".       ROS:  Denies SI/manic/sx.  See HPI, otherwise negative.      Current Outpatient Medications:      topiramate (TOPAMAX) 50 MG tablet, Take 1 tablet (50 mg) by mouth At Bedtime, Disp: 90 tablet, Rfl: 1     albuterol (PROAIR HFA) 108 (90 Base) MCG/ACT inhaler, Inhale 2 puffs into the lungs every 4 hours as needed for shortness of breath / dyspnea or wheezing, Disp: 8 g, Rfl: 4     Brivaracetam (BRIVIACT) 100 MG tablet, Take 150 mg by mouth 2 times daily , Disp: , Rfl:      Calcium Citrate-Vitamin D (CALCIUM " + D PO), Take 1 tablet by mouth daily , Disp: , Rfl:      carBAMazepine (TEGRETOL) 200 MG tablet, Take 300-400 mg by mouth 300mg in AM, 300mg in afternoon and 300mg at PM., Disp: , Rfl:      Cyanocobalamin (VITAMIN B12) 1000 MCG TBCR, Take 1 tablet by mouth, Disp: , Rfl:      DULoxetine (CYMBALTA) 60 MG capsule, Take 2 capsules (120 mg) by mouth daily, Disp: 180 capsule, Rfl: 1     EPINEPHrine 0.15 MG/0.15ML SOAJ, Inject 1 each into the muscle , Disp: , Rfl:      fexofenadine (ALLEGRA) 180 MG tablet, Take 1 tablet (180 mg) by mouth daily, Disp: 30 tablet, Rfl: 1     fluticasone (FLONASE) 50 MCG/ACT nasal spray, INHALE 1 SPRAY IN EACH NOSTRIL DAILY, Disp: 16 g, Rfl: 4     hydrochlorothiazide (HYDRODIURIL) 25 MG tablet, Take 1 tablet (25 mg) by mouth daily, Disp: 90 tablet, Rfl: 3     hydrOXYzine (ATARAX) 10 MG tablet, Take 1 tablet (10 mg) by mouth 2 times daily as needed for anxiety, Disp: 60 tablet, Rfl: 6     levothyroxine (SYNTHROID/LEVOTHROID) 300 MCG tablet, Take 1 tablet (300 mcg) by mouth daily, Disp: 90 tablet, Rfl: 3     melatonin 10 MG TABS tablet, Take 1 tablet (10 mg) by mouth nightly as needed for sleep, Disp: 30 tablet, Rfl: 2     multivitamin with iron (CHROMAGEN) TABS half-tab, , Disp: , Rfl:      Nutritional Supplements (NUTRITIONAL SHAKE HIGH PROTEIN) LIQD, Take 1 each by mouth every morning Dispense Premier protein shakes. Replace breakfast with 1 shake daily., Disp: 9900 mL, Rfl: 11     olopatadine (PATADAY) 0.2 % ophthalmic solution, Place 1 drop into both eyes daily, Disp: , Rfl:      omeprazole (PRILOSEC) 20 MG DR capsule, Take 1 capsule (20 mg) by mouth 2 times daily, Disp: 180 capsule, Rfl: 3     oxybutynin ER (DITROPAN-XL) 10 MG 24 hr tablet, Take 1 tablet (10 mg) by mouth daily, Disp: 90 tablet, Rfl: 3     Polyethylene Glycol POWD, See Admin Instructions, Disp: , Rfl:      prazosin (MINIPRESS) 1 MG capsule, Take 1 capsule (1 mg) by mouth At Bedtime, Disp: 30 capsule, Rfl: 6     QUEtiapine  (SEROQUEL) 200 MG tablet, Take 1 tablet (200 mg) by mouth At Bedtime, Disp: 90 tablet, Rfl: 1     rosuvastatin (CRESTOR) 20 MG tablet, TAKE 1 TABLET BY MOUTH DAILY, Disp: 90 tablet, Rfl: 3     salmeterol (SEREVENT) 50 MCG/DOSE inhaler, Inhale 1 puff into the lungs 2 times daily, Disp: 180 each, Rfl: 4     SENNA-TIME 8.6 MG tablet, TAKE 2 TABLETS (17.2MG) BY MOUTH TWICE A DAY, Disp: 360 tablet, Rfl: 3     tiZANidine (ZANAFLEX) 4 MG tablet, Take 1 tablet (4 mg) by mouth 3 times daily, Disp: 42 tablet, Rfl: 3     Vitamin D-Vitamin K (VITAMIN K2-VITAMIN D3)  MCG-UNIT CAPS, 2 capsules, Disp: , Rfl:      vitamin D3 (CHOLECALCIFEROL) 50 mcg (2000 units) tablet, Take 2 tablets (100 mcg) by mouth daily, Disp: 180 tablet, Rfl: 3     warfarin ANTICOAGULANT (COUMADIN) 5 MG tablet, New dose - take 2 tablets (10mg) to 3 tablets (15mg) by mouth daily, as directed.  Adjust dose based on INR results., Disp: 210 tablet, Rfl: 1    Current Facility-Administered Medications:      denosumab (PROLIA) injection 60 mg, 60 mg, Subcutaneous, Q6 Months, Alice Rolle MD, 60 mg at 11/12/21 1251       LMP  (LMP Unknown)      MSE:      Alert & oriented x 3.   Appearance: Neat.  Speech: Normal rate, rhythm and tone.  Gait: Not observed, in w/c  Musculoskeletal: No abnormal movements-had pt remove mask and observed tongue.  No fasciculations or movement.  Mood/Affect: Full range  Thought Process: Normal rate, perseverative  Thought Content: No suicide or homicide ideation.  Associations: Intact, no delusions.  Perceptions: No hallucinations.  Memory: recent and remote memory intact, not formally tested  Attention span and concentration: normal, not formally tested  Language: Intact.  Fund of Knowledge: Normal.  Insight and Judgement: Adequate.         Total time spent on this encounter, on the date of service, including pre-visit review of separately obtained history, face-to-face interaction performing medically appropriate physical  exam, patient counseling/education, interpretation of diagnostic results, care coordination and documentation was 50 minutes.      Patient has given verbal consent to a video visit? Yes   Type of service: Video Visit   Video Start Time: 10:00   Video End Time: 10:38  Total time spent: 38 minutes   Patient Location: patient's home   Provider Location: office   Mode of Communication: Video call via [employer].          Breana Puente MD

## 2022-04-20 ENCOUNTER — LAB (OUTPATIENT)
Dept: LAB | Facility: CLINIC | Age: 62
End: 2022-04-20
Payer: MEDICARE

## 2022-04-20 ENCOUNTER — ANTICOAGULATION THERAPY VISIT (OUTPATIENT)
Dept: ANTICOAGULATION | Facility: CLINIC | Age: 62
End: 2022-04-20

## 2022-04-20 DIAGNOSIS — I26.99 PULMONARY EMBOLISM WITH INFARCTION (H): ICD-10-CM

## 2022-04-20 DIAGNOSIS — Z86.711 HISTORY OF PULMONARY EMBOLISM: Primary | ICD-10-CM

## 2022-04-20 LAB — INR BLD: 4.7 (ref 0.9–1.1)

## 2022-04-20 PROCEDURE — 85610 PROTHROMBIN TIME: CPT

## 2022-04-20 PROCEDURE — 36416 COLLJ CAPILLARY BLOOD SPEC: CPT

## 2022-04-20 NOTE — PROGRESS NOTES
Just now (4:03 PM)     KB       Reason for Call:  Other - Anticoagulation     Detailed comments: Patient called and stated she was returning a call to INR nurse Klyah.  attempted to reach INR nurse at: [phone]. There was no answer. Please call patient back.     Phone Number Patient can be reached at: Home number on file [phone] (home)     Best Time: Anytime     Can we leave a detailed message on this number? YES     Call taken on 4/20/2022 at 4:02 PM by Valentina Truong [phone]  Gisela Pittman

## 2022-04-20 NOTE — PROGRESS NOTES
ANTICOAGULATION MANAGEMENT     Valentina Olmedo 62 year old female is on warfarin with supratherapeutic INR result. (Goal INR 2.0-3.0)    Recent labs: (last 7 days)     04/20/22  1219   INR 4.7*       ASSESSMENT       Source(s): Chart Review, Patient/Caregiver Call and Template       Warfarin doses taken: Warfarin recently held for partial warfarin hold on 4/13/22 which may be affecting INR.   Has meds set up by home care who comes in weekly.    Diet: not eating well and skipping meals may be affecting diet and INR    New illness, injury, or hospitalization: Yes:    Reported diarrhea which has been ongoing for a while.  She is keeping well hydrated.    Medication/supplement changes: None noted    Not taking Pepto Bismo.  (only took it one time)    Signs or symptoms of bleeding or clotting: No    Previous INR: Supratherapeutic last 2 INRs both at 3.4    Additional findings: called and left detailed message with patient's home care RAJEEV Stoddard - Cell# [phone]       PLAN     Recommended plan for ongoing change(s) affecting INR     Dosing Instructions:   (evenings. 5mg tabs)   - Advised to HOLD warfarin dose.   - then decrease your warfarin dose (14.3% change) with next INR in 7-10 days       Summary  As of 4/20/2022    Full warfarin instructions:  4/20: Hold; Otherwise 5 mg every Tue, Fri; 10 mg all other days   Next INR check:  4/29/2022             Telephone call with  Valentina ([phone]) who verbalizes understanding and agrees to plan    - advised Valentina to ensure she follows up with PCP if diarrhea continues to persists.  To stay well hydrated with fluids to prevent dehydration.   - also to ensure she does not skip meals.    Lab visit scheduled - INR on 4/25/22 @ [facility].   - also has appt for physical therapy with neuro evaluation - COPD / dyspnea.    Education provided: Importance of consistent vitamin K intake, Impact of vitamin K foods on INR, Goal range and significance of current result, Monitoring for bleeding  signs and symptoms and Importance of notifying clinic for diarrhea, nausea/vomiting, reduced intake, and/or illness; a sooner lab recheck maybe needed.    Plan made per [facility] anticoagulation protocol    Shonna Ernandez RN  Anticoagulation Clinic  4/20/2022    _______________________________________________________________________     Anticoagulation Episode Summary     Current INR goal:  2.0-3.0   TTR:  41.6 % (1 y)   Target end date:  Indefinite   Send INR reminders to:  [facility]    Indications    History of pulmonary embolism [Z86.711]  Pulmonary embolism with infarction (H) [I26.99]           Comments:           Anticoagulation Care Providers     Provider Role Specialty Phone number    Promise Vanegas MD Referring Family Medicine [phone]    Donald Rooney MD Referring Internal Medicine [phone]

## 2022-04-20 NOTE — PROGRESS NOTES
Medications Phoned  to Pharmacy [] yes [x]no     Medications ordered this visit were e-scribed.  Verified by order class [x] yes  [] no  List medications sent to pharmacy: Topamax 50mg  Medication changes or discontinuations were communicated to patient's pharmacy: [] yes  [x] no     Dictation completed at time of chart check: [x] yes  [] no     I have checked the documentation for today s encounters and the above information has been reviewed and completed.        Regla Storey, EDWARD April 20, 2022 1:49 PM

## 2022-04-21 ENCOUNTER — IMMUNIZATION (OUTPATIENT)
Dept: NURSING | Facility: CLINIC | Age: 62
End: 2022-04-21
Payer: MEDICARE

## 2022-04-21 PROCEDURE — 0054A COVID-19,PF,PFIZER (12+ YRS): CPT

## 2022-04-21 PROCEDURE — 91305 COVID-19,PF,PFIZER (12+ YRS): CPT

## 2022-04-25 ENCOUNTER — LAB (OUTPATIENT)
Dept: LAB | Facility: CLINIC | Age: 62
End: 2022-04-25

## 2022-04-25 ENCOUNTER — ANTICOAGULATION THERAPY VISIT (OUTPATIENT)
Dept: ANTICOAGULATION | Facility: CLINIC | Age: 62
End: 2022-04-25

## 2022-04-25 ENCOUNTER — HOSPITAL ENCOUNTER (OUTPATIENT)
Dept: PHYSICAL THERAPY | Facility: REHABILITATION | Age: 62
Discharge: HOME | End: 2022-04-25
Attending: INTERNAL MEDICINE
Payer: MEDICARE

## 2022-04-25 DIAGNOSIS — M80.00XG AGE-RELATED OSTEOPOROSIS WITH CURRENT PATHOLOGICAL FRACTURE WITH DELAYED HEALING, SUBSEQUENT ENCOUNTER: ICD-10-CM

## 2022-04-25 DIAGNOSIS — R26.9 ABNORMALITY OF GAIT: ICD-10-CM

## 2022-04-25 DIAGNOSIS — Z86.711 HISTORY OF PULMONARY EMBOLISM: Primary | ICD-10-CM

## 2022-04-25 DIAGNOSIS — R29.818 DIFFICULTY BALANCING: ICD-10-CM

## 2022-04-25 DIAGNOSIS — R06.00 DYSPNEA, UNSPECIFIED TYPE: ICD-10-CM

## 2022-04-25 DIAGNOSIS — M54.41 CHRONIC BILATERAL LOW BACK PAIN WITH RIGHT-SIDED SCIATICA: Primary | ICD-10-CM

## 2022-04-25 DIAGNOSIS — R53.81 PHYSICAL DECONDITIONING: ICD-10-CM

## 2022-04-25 DIAGNOSIS — I26.99 PULMONARY EMBOLISM WITH INFARCTION (H): ICD-10-CM

## 2022-04-25 DIAGNOSIS — M62.81 GENERALIZED MUSCLE WEAKNESS: ICD-10-CM

## 2022-04-25 DIAGNOSIS — J44.9 CHRONIC OBSTRUCTIVE PULMONARY DISEASE, UNSPECIFIED COPD TYPE (H): ICD-10-CM

## 2022-04-25 DIAGNOSIS — G89.29 CHRONIC BILATERAL LOW BACK PAIN WITH RIGHT-SIDED SCIATICA: Primary | ICD-10-CM

## 2022-04-25 LAB — INR BLD: 1.5 (ref 0.9–1.1)

## 2022-04-25 PROCEDURE — 97161 PT EVAL LOW COMPLEX 20 MIN: CPT | Mod: GP | Performed by: PHYSICAL THERAPIST

## 2022-04-25 PROCEDURE — 36416 COLLJ CAPILLARY BLOOD SPEC: CPT

## 2022-04-25 PROCEDURE — 97110 THERAPEUTIC EXERCISES: CPT | Mod: GP | Performed by: PHYSICAL THERAPIST

## 2022-04-25 PROCEDURE — 85610 PROTHROMBIN TIME: CPT

## 2022-04-25 NOTE — DISCHARGE INSTRUCTIONS
Talk with  about possibility of getting a pulse oximeter for home to measure oxygen and heart rate with attempting increase in walking distance     Pt's  Jose Morfin  [phone] or [email or web address]  Griselda will email him too    Try to start walking outside with walker - keep track of distance and time it takes you to walk    Try to start doing sit to stand    X5-10 reps 1-2x/day

## 2022-04-25 NOTE — PROGRESS NOTES
ANTICOAGULATION MANAGEMENT     Valentina Olmedo 62 year old female is on warfarin with subtherapeutic INR result. (Goal INR 2.0-3.0)    Recent labs: (last 7 days)     04/25/22  1245   INR 1.5*       ASSESSMENT       Source(s): Chart Review, Patient/Caregiver Call and Template       Warfarin doses taken: Warfarin recently held for 1 day which may be affecting INR    Template was wrong, however, verified only holding on 4/20.    Diet:  still poor nutritional intake daily  may be affecting diet and INR    Reported still not able to eat, so she is supplementing her meals with 1 bottle of Equate Protein Plus shakes 3x per day since 4/20.    New illness, injury, or hospitalization: Yes:   Reported she has not had diarrhea since last 4/20/22.   Reported no recent WT loss.  However, did mention a 12 lbs loss one month ago.    Medication/supplement changes: None noted    Vaccinated with 2nd Pfizer Covid-19 booster on 4/21/22.    Signs or symptoms of bleeding or clotting: No    Previous INR: Supratherapeutic at 4.7 on 4/20/22.    Additional findings:  Called and left detailed message with Valentina's RN - Arlyn ([phone]) with new warfarin instructions.  She sets up medications every Monday.       PLAN     Recommended plan for temporary change(s) and ongoing change(s) affecting INR     Dosing Instructions:   (evenings. 5mg tabs)    booster dose then Increase your warfarin dose (8.3% change) with next INR in 1-2 weeks       Summary  As of 4/25/2022    Full warfarin instructions:  4/25: 15 mg; Otherwise 5 mg every Fri; 10 mg all other days   Next INR check:  5/9/2022             Telephone call with  Valentina ([phone]) who verbalizes understanding and agrees to plan    - advised to introduce back food slowly.   - if diarrhea recurs and persistent, advised OV with Dr. Morin.    Check at provider office visit - INR on 5/2/22 during PT @ [facility].    Education provided: Importance of consistent vitamin K intake, Impact of vitamin K  foods on INR, Goal range and significance of current result, Potential interaction between warfarin and Equate Protein Plus shake and Monitoring for clotting signs and symptoms    Plan made per [facility] anticoagulation protocol    Shonna Ernandez RN  Anticoagulation Clinic  4/25/2022    _______________________________________________________________________     Anticoagulation Episode Summary     Current INR goal:  2.0-3.0   TTR:  41.6 % (1 y)   Target end date:  Indefinite   Send INR reminders to:  [facility]    Indications    History of pulmonary embolism [Z86.711]  Pulmonary embolism with infarction (H) [I26.99]           Comments:           Anticoagulation Care Providers     Provider Role Specialty Phone number    Promise Vanegas MD Referring Family Medicine [phone]    Donald Rooney MD Referring Internal Medicine [phone]

## 2022-04-25 NOTE — PROGRESS NOTES
TWO- MINUTE WALK TEST (2mwt) MEANS     MEAN DISTANCE (FEET) METERS       + cm   18-54 600.4 183 m 0.192  cm   55-59 578.7 176 m 38.776 cm   60-64 545.9 166 m 32.936 cm   65-69 509.2 155 m 20.416 cm   70-74 478.7 145 m 95.9576 cm   75-79 462.3 140 m 90.904 cm   80-85 440.6 136 m 29.488 cm         MEN   MEAN DISTANCE (FEET) METERS     + cm   18-54 659.1 200 m 89.368 cm   55-59 626.6 190 m 98.768 cm   60-64 587.6 179 m 10.048 cm   65-69 604.3 184 m 19.064 cm   70-74 565.6 172 m 39.488 cm   75-79 517.1 157 m 61.208 cm   80-85 472.7 144 m 7.896 cm             R.W Marielos et al (2014)  [facility] Cheryl Bernard PT, CLT-RAMSEY

## 2022-04-27 ENCOUNTER — MYC MEDICAL ADVICE (OUTPATIENT)
Dept: FAMILY MEDICINE | Facility: CLINIC | Age: 62
End: 2022-04-27
Payer: MEDICARE

## 2022-04-28 NOTE — TELEPHONE ENCOUNTER
Looks like order needs to be faxed please print order for wheel chair scooter and fax to number provided in message

## 2022-04-28 NOTE — PROGRESS NOTES
[facility]                                                                                   OUTPATIENT PHYSICAL THERAPY FUNCTIONAL EVALUATION  PLAN OF TREATMENT FOR OUTPATIENT REHABILITATION  (COMPLETE FOR INITIAL CLAIMS ONLY)  Patient's Last Name, First Name, M.I.  YOB: 1960  Valentina Olmedo     Provider's Name   [facility]   Medical Record No.  [id number]     Start of Care Date:  04/25/22   Onset Date:  03/15/22   Type:     _X__PT   ____OT  ____SLP Medical Diagnosis:  COPD with dyspnea     PT Diagnosis:  Physical deconditioning with dyspnea Visits from SOC:  1                              __________________________________________________________________________________  Plan of Treatment/Functional Goals:  (P) ADL retraining, balance training, gait training, neuromuscular re-education, motor coordination training, ROM, strengthening, stretching, transfer training           GOALS     Pt will be able to walk outside with 4WW with minimal SOB and difficulty for improved quality of physical condition in 90 days.  07/24/22       Pt will be able to improve her walking distance with 2 minute walking test to improve her aerobic capacity towards age/gender norms for improved QOL in 90 days.  07/24/22               Therapy Frequency:  1 time/week   Predicted Duration of Therapy Intervention:  90 days    Griselda Garcia, PT, DPT, OCS, CLT                                   I CERTIFY THE NEED FOR THESE SERVICES FURNISHED UNDER        THIS PLAN OF TREATMENT AND WHILE UNDER MY CARE     (Physician co-signature of this document indicates review and certification of the therapy plan).              Certification Date From:  04/25/22   Certification Date To:  07/24/22    Referring Provider:  Dr. Yon Balderas    Initial Assessment  See Epic Evaluation- Start of Care  Date: 04/25/22 04/25/22 1300   Quick Adds   Quick Adds Certification   Type of Visit Initial OP PT Evaluation   General Information   Start of Care Date 04/25/22   Referring Physician Dr. Yon Balderas   Orders Evaluate and Treat as Indicated   Order Date 03/15/22   Medical Diagnosis COPD with dyspnea   Onset of illness/injury or Date of Surgery 03/15/22   Precautions/Limitations fall precautions   Surgical/Medical history reviewed Yes   Pertinent history of current problem (include personal factors and/or comorbidities that impact the POC) Pt reports she has a small amount of emphysema and she saw the pulmonary MD and he wanted her to get some physical therapy for her breathing difficulties. MD believes that pt's dyspnea is related to her physical deconditioning. Pt reports she is practicing walking - will walk in her apartment without an assistive device. Pt mostly is in a wheelchair when she is out of the house. Pt reports she can lose some of her sensation in her feet and hands and is going to met with a neurologist to assess these sx in the near future. Pt also reports she is trying to play pool - trying 1-2x/week. Pt will do her turn and then sit back down in between her turns. Pt also is struggling with being dizzy when she stands up or walks and sometimes pt will have to lay down to help the dizziness go away. Pt reports she also has h/o with low back pain (right side worse - where pt had a stress fracture) and into mid back as well. Pt can have increased back pain with standing and walking and it can bother down into R side. Pt can get increased SOB - she has an inhaler that is supposed to use 2x/day (waiting for seravent prescription) and then has a rescue inhaler (albuterol) that she can use anytime needed. Pt struggles to remember to use her inhaler as she forgets about it (even though she always has it with her). Pt would like to start walking outside with her 4 WW and her goal is to  try to be able to get out of her wheelchair permanently. Pt also has talked with primary MD about getting a scooter and a lift chair - pt reports her  sometimes has to help her get off the couch. Pt reports she did end up going to the emergency room 1 time this winter due to increased left chest pain after a chiropractic treatment - pt was cleared with no evidence of heart attack. Pt reports no history of cardiac involvement. Pt reports she is currently still smoking but working on quiting. Pt wants to quit smoking - but she doesn't have a plan but has felt like she has been having less desire to have a cigarette and is hoping she will just fade out of her desire to keep smoking on her own.   Prior level of function comment Pt reports using a wheelchair since maybe around 2020. Pt did have a spinal fracture around 2021 when she slipped and fell.   Fall Risk Screen   Fall screen completed by PT   Have you fallen 2 or more times in the past year? Yes   Have you fallen and had an injury in the past year? No   Is patient a fall risk? Yes   Fall screen comments performed [employer], handout given   Abuse Screen (yes response referral indicated)   Feels Unsafe at Home or Work/School no   Feels Threatened by Someone no   Does Anyone Try to Keep You From Having Contact with Others or Doing Things Outside Your Home? no   Physical Signs of Abuse Present no   Functional Scales   Functional Scales and Outcomes LEFS 14/80   Pain   Pain comments pt can have pain up to 8/10 in low back and into R hip - especially when she is standing and weight bearing   Posture   Posture Comments Rounded shoulders, forward head, increased lumbar lordosis   Strength   Strength Comments R hip flexor 3+/5, L hip flexor 4/5, B quad and DF 5/5 with min pain B LEs   Transfer Skills   Transfer Comments Usually uses UE assist but is able to without UE with cueing   Locomotion   Wheel Chair Mobility Comments Primary way of transportation - partner pushes  pt   Gait   Gait Comments Prior to walking O2 96% and HR 77 bpm, 2 minute walk test pt went 192 ft in 1 minute 9 seconds before she wanted to stop walking due to SOB, fatigue and back pain (age/gender norm is 545 ft), after walking pt's HR was 89-92 bpm and O2 sats 95% - pt did use her inhaler afterwards to help with SOB   Balance   Balance Comments [employer] sit to stand 7 reps in 30 seconds (age/gender norms 14 reps) - pt tended to hold her breath as she tried to stand up and then got mildly SOB after testing for 1-2 minutes afterwards   Planned Therapy Interventions   Planned Therapy Interventions ADL retraining;balance training;gait training;neuromuscular re-education;motor coordination training;ROM;strengthening;stretching;transfer training   Clinical Impression   Criteria for Skilled Therapeutic Interventions Met yes, treatment indicated   PT Diagnosis Physical deconditioning with dyspnea   Clinical Presentation Evolving/Changing   Clinical Decision Making (Complexity) Moderate complexity   Therapy Frequency 1 time/week   Predicted Duration of Therapy Intervention (days/wks) 90 days   Risk & Benefits of therapy have been explained Yes   Patient, Family & other staff in agreement with plan of care Yes   Clinical Impression Comments Pt demo's signs and sx consistent with physical deconditioning with significantly below age/gender norms for 2 minute walking endurance and sit to stand balance abilities with generalized muscel weakness and h/o low back pain and neuropathy and has been using a w/c as primary mode of transportation for the past 2-3 years. Pt is great candidate for skilled PT services to improve impairments and reach goals.   GOALS   PT Eval Goals 1;2;3   Goal 1   Goal Description Pt will be able to walk outside with 4WW with minimal SOB and difficulty for improved quality of physical condition in 90 days.   Target Date 07/24/22   Goal 2   Goal Description Pt will be able to improve her walking distance  with 2 minute walking test to improve her aerobic capacity towards age/gender norms for improved QOL in 90 days.   Target Date 07/24/22   Total Evaluation Time   PT Eval, Low Complexity Minutes (55352) 30   Therapy Certification   Certification date from 04/25/22   Certification date to 07/24/22   Medical Diagnosis COPD with dyspnea   Certification I certify the need for these services furnished under this plan of treatment and while under my care.  (Physician co-signature of this document indicates review and certification of the therapy plan).

## 2022-05-02 ENCOUNTER — ANTICOAGULATION THERAPY VISIT (OUTPATIENT)
Dept: ANTICOAGULATION | Facility: CLINIC | Age: 62
End: 2022-05-02

## 2022-05-02 ENCOUNTER — LAB (OUTPATIENT)
Dept: LAB | Facility: CLINIC | Age: 62
End: 2022-05-02
Payer: MEDICARE

## 2022-05-02 ENCOUNTER — HOSPITAL ENCOUNTER (OUTPATIENT)
Dept: PHYSICAL THERAPY | Facility: REHABILITATION | Age: 62
Discharge: HOME | End: 2022-05-02
Payer: MEDICARE

## 2022-05-02 DIAGNOSIS — I26.99 PULMONARY EMBOLISM WITH INFARCTION (H): ICD-10-CM

## 2022-05-02 DIAGNOSIS — R06.00 DYSPNEA, UNSPECIFIED TYPE: ICD-10-CM

## 2022-05-02 DIAGNOSIS — R29.818 DIFFICULTY BALANCING: ICD-10-CM

## 2022-05-02 DIAGNOSIS — M54.41 CHRONIC BILATERAL LOW BACK PAIN WITH RIGHT-SIDED SCIATICA: ICD-10-CM

## 2022-05-02 DIAGNOSIS — G89.29 CHRONIC BILATERAL LOW BACK PAIN WITH RIGHT-SIDED SCIATICA: ICD-10-CM

## 2022-05-02 DIAGNOSIS — Z87.81 HISTORY OF VERTEBRAL COMPRESSION FRACTURE: ICD-10-CM

## 2022-05-02 DIAGNOSIS — J44.9 CHRONIC OBSTRUCTIVE PULMONARY DISEASE, UNSPECIFIED COPD TYPE (H): Primary | ICD-10-CM

## 2022-05-02 DIAGNOSIS — Z86.711 HISTORY OF PULMONARY EMBOLISM: Primary | ICD-10-CM

## 2022-05-02 DIAGNOSIS — R26.9 ABNORMALITY OF GAIT: ICD-10-CM

## 2022-05-02 DIAGNOSIS — M62.81 GENERALIZED MUSCLE WEAKNESS: ICD-10-CM

## 2022-05-02 DIAGNOSIS — M80.00XG AGE-RELATED OSTEOPOROSIS WITH CURRENT PATHOLOGICAL FRACTURE WITH DELAYED HEALING, SUBSEQUENT ENCOUNTER: ICD-10-CM

## 2022-05-02 DIAGNOSIS — R53.81 PHYSICAL DECONDITIONING: ICD-10-CM

## 2022-05-02 LAB — INR BLD: 2 (ref 0.9–1.1)

## 2022-05-02 PROCEDURE — 97110 THERAPEUTIC EXERCISES: CPT | Mod: GP | Performed by: PHYSICAL THERAPIST

## 2022-05-02 PROCEDURE — 85610 PROTHROMBIN TIME: CPT

## 2022-05-02 PROCEDURE — 36415 COLL VENOUS BLD VENIPUNCTURE: CPT

## 2022-05-02 NOTE — PROGRESS NOTES
ANTICOAGULATION MANAGEMENT     Valentina Olmedo 62 year old female is on warfarin with therapeutic INR result. (Goal INR 2.0-3.0)    Recent labs: (last 7 days)     05/02/22  1308   INR 2.0*       ASSESSMENT       Source(s): Chart Review, Patient/Caregiver Call and Template       Warfarin doses taken: Warfarin taken as instructed    Diet: Protein supplement/shake continues which maybe affecting INR    Has been slowly introducing solid foods.  She has lost 15 lbs.   Continuing with Equate Protein Plus shakes now down to 1 can per day instead of 3 cans.    New illness, injury, or hospitalization: Yes:    Reported diarrhea easing up, but had diarrhea episode yesterday.    Medication/supplement changes: None noted    Signs or symptoms of bleeding or clotting: No    Previous INR: Subtherapeutic at 1.5 on 4/25/22.    Additional findings: Called and left detailed message with Valentina's RN - Arlyn ([phone])        PLAN     Recommended plan for temporary change(s) affecting INR     Dosing Instructions: continue your current warfarin dose with next INR in 1-2 weeks       Summary  As of 5/2/2022    Full warfarin instructions:  5 mg every Fri; 10 mg all other days   Next INR check:  5/16/2022             Telephone call with  Valentina ([phone]) who verbalizes understanding and agrees to plan    - also left detailed message with [facility].  She sets up medications for Valentina.    Lab visit scheduled - INR on 5/12/22 @ [facility]    - also scheduled for Prolia injection.    Education provided: Importance of consistent vitamin K intake, Goal range and significance of current result and Importance of notifying clinic for diarrhea, nausea/vomiting, reduced intake, and/or illness; a sooner lab recheck maybe needed.    Plan made per [facility] anticoagulation protocol    Shonna Ernandez, RN  Anticoagulation Clinic  5/2/2022    _______________________________________________________________________     Anticoagulation Episode  Summary     Current INR goal:  2.0-3.0   TTR:  39.7 % (1 y)   Target end date:  Indefinite   Send INR reminders to:  [facility]    Indications    History of pulmonary embolism [Z86.711]  Pulmonary embolism with infarction (H) [I26.99]           Comments:           Anticoagulation Care Providers     Provider Role Specialty Phone number    Promise Vanegas MD Referring Family Medicine [phone]    Donald Rooney MD Referring Internal Medicine [phone]

## 2022-05-02 NOTE — DISCHARGE INSTRUCTIONS
Pool 2-3 times this week - play 1 game and then sit the next game out and then play a second game    Practice sit to stand exercise on days that you are not playing pool  1-2x/day on those days you aren't playing pool    Practice walking on sidewalk first with walker 1 block 1 time per day - then take a day off of walking outside - then increase to 2 walks per day, then a day off, then 3 walks per day, then the next day try 1 walk, then 3 walks, then 2 walks, then 3 walks, then 3 walks    Seated exercises to help with muscle tone and circulation      Hip march x5-10 reps 1-2x/day      Kicking x5-10 reps each side 1-2x/day      Pump ankles up and down x10-20 reps 1-2x/day

## 2022-05-03 ENCOUNTER — TELEPHONE (OUTPATIENT)
Dept: BEHAVIORAL HEALTH | Facility: CLINIC | Age: 62
End: 2022-05-03
Payer: MEDICARE

## 2022-05-04 ENCOUNTER — TELEPHONE (OUTPATIENT)
Dept: ANTICOAGULATION | Facility: CLINIC | Age: 62
End: 2022-05-04
Payer: MEDICARE

## 2022-05-04 NOTE — PROGRESS NOTES
Patient left a VM at 12:45 pm on the Home Care Line asking for a call back.  Thank you.  Melissa Montenegro, RN  Anticoagulation Nurse - [facility]

## 2022-05-04 NOTE — TELEPHONE ENCOUNTER
Melissa Montenegro, RN 1 hour ago (12:51 PM)     CN       Patient left a VM at 12:45 pm on the Home Care Line asking for a call back.  Thank you.  Melissa Montenegro, RN  Anticoagulation Nurse - [place] [facility] [facility] [place]         Valentina Olmedo [phone]  Melissa Montenegro, RN

## 2022-05-04 NOTE — TELEPHONE ENCOUNTER
Called and talked with Valentina ([phone])     - she reported seeing her OB-GYN 5.3.22 and 2 RXs were prescribed.    - Oxybutnin    - started on 5/3/22, SMG-/160mg BID for 10 days, from 5/3-13.    - she reported test result showed -  Salmonella     - known interaction - Concurrent use of SULFAMETHOXAZOLE and WARFARIN may result in increased warfarin exposure.     - INR scheduled on 5/6/22 @ [facility].

## 2022-05-05 ENCOUNTER — TELEPHONE (OUTPATIENT)
Dept: PSYCHOLOGY | Facility: CLINIC | Age: 62
End: 2022-05-05
Payer: MEDICARE

## 2022-05-05 NOTE — TELEPHONE ENCOUNTER
Reason for call: Patient did not arrive answer for telephone visit.     Outcome: Left encouraging voicemail to reschedule appointment     Follow-up: Await for patient to reschedule appointment.     CUBA Kapoor, LGSW

## 2022-05-06 ENCOUNTER — ANTICOAGULATION THERAPY VISIT (OUTPATIENT)
Dept: ANTICOAGULATION | Facility: CLINIC | Age: 62
End: 2022-05-06

## 2022-05-06 ENCOUNTER — LAB (OUTPATIENT)
Dept: LAB | Facility: CLINIC | Age: 62
End: 2022-05-06
Payer: MEDICARE

## 2022-05-06 DIAGNOSIS — Z86.711 HISTORY OF PULMONARY EMBOLISM: Primary | ICD-10-CM

## 2022-05-06 DIAGNOSIS — I26.99 PULMONARY EMBOLISM WITH INFARCTION (H): ICD-10-CM

## 2022-05-06 LAB — INR BLD: 2.4 (ref 0.9–1.1)

## 2022-05-06 PROCEDURE — 36415 COLL VENOUS BLD VENIPUNCTURE: CPT

## 2022-05-06 PROCEDURE — 85610 PROTHROMBIN TIME: CPT

## 2022-05-06 NOTE — PROGRESS NOTES
ANTICOAGULATION MANAGEMENT     Valentina ORNELAS Juan Ramonjuan diego 62 year old female is on warfarin with therapeutic INR result. (Goal INR 2.0-3.0)    Recent labs: (last 7 days)     05/06/22  1330   INR 2.4*       ASSESSMENT       Source(s): Chart Review, Patient/Caregiver Call and Template       Warfarin doses taken: Warfarin taken as instructed    Diet: No new diet changes identified    Has been introducing solid food and so far doing well.   Continues to drink protein shake 1 can per day.    New illness, injury, or hospitalization: No    Medication/supplement changes: None noted    Signs or symptoms of bleeding or clotting: No    Previous INR: Therapeutic last visi at 2.0t; previously outside of goal rangeat 1.5    Additional findings: Called and left detailed message with Valentina's RN - Arlyn ([phone])        PLAN     Recommended plan for no diet, medication or health factor changes affecting INR     Dosing Instructions:   (evenings. 5mg tabs)    continue your current warfarin dose with next INR in 2 weeks       Summary  As of 5/6/2022    Full warfarin instructions:  5 mg every Fri; 10 mg all other days   Next INR check:  5/27/2022             Telephone call with  Valentina ([phone]) who verbalizes understanding and agrees to plan    Lab visit scheduled - INR on 5/16/22 @ [facility].    Education provided: Importance of consistent vitamin K intake and Goal range and significance of current result    Plan made per [facility] anticoagulation protocol    Shonna Ernandez, RN  Anticoagulation Clinic  5/6/2022    _______________________________________________________________________     Anticoagulation Episode Summary     Current INR goal:  2.0-3.0   TTR:  39.7 % (1 y)   Target end date:  Indefinite   Send INR reminders to:  [facility]    Indications    History of pulmonary embolism [Z86.711]  Pulmonary embolism with infarction (H) [I26.99]           Comments:           Anticoagulation Care Providers     Provider Role Specialty Phone  number    Promise Vanegas MD Referring Family Medicine [phone]    Donald Rooney MD Referring Internal Medicine [phone]

## 2022-05-07 ENCOUNTER — HOSPITAL ENCOUNTER (OUTPATIENT)
Dept: CT IMAGING | Facility: CLINIC | Age: 62
Discharge: HOME | End: 2022-05-07
Attending: OBSTETRICS & GYNECOLOGY | Admitting: OBSTETRICS & GYNECOLOGY
Payer: MEDICARE

## 2022-05-07 DIAGNOSIS — R10.2 PELVIC AND PERINEAL PAIN: ICD-10-CM

## 2022-05-07 PROCEDURE — 250N000011 HC RX IP 250 OP 636: Performed by: OBSTETRICS & GYNECOLOGY

## 2022-05-07 PROCEDURE — 74177 CT ABD & PELVIS W/CONTRAST: CPT

## 2022-05-07 RX ORDER — IOPAMIDOL 755 MG/ML
100 INJECTION, SOLUTION INTRAVASCULAR ONCE
Status: COMPLETED | OUTPATIENT
Start: 2022-05-07 | End: 2022-05-07

## 2022-05-07 RX ADMIN — IOPAMIDOL 100 ML: 755 INJECTION, SOLUTION INTRAVENOUS at 13:17

## 2022-05-10 DIAGNOSIS — Z91.030 ALLERGY TO HONEY BEE VENOM: Primary | ICD-10-CM

## 2022-05-10 NOTE — TELEPHONE ENCOUNTER
Medication Request  Medication name: EPINEPHRINE 0.3ML INJ  Requested Pharmacy: [facility]  When was patient last seen for this?:  4/7/22  Patient offered appointment: N/A pharmacy sent request  Okay to leave a detailed message: no

## 2022-05-11 ENCOUNTER — TRANSFERRED RECORDS (OUTPATIENT)
Dept: HEALTH INFORMATION MANAGEMENT | Facility: CLINIC | Age: 62
End: 2022-05-11
Payer: MEDICARE

## 2022-05-11 ENCOUNTER — TELEPHONE (OUTPATIENT)
Dept: FAMILY MEDICINE | Facility: CLINIC | Age: 62
End: 2022-05-11
Payer: MEDICARE

## 2022-05-11 DIAGNOSIS — T63.441S: Primary | ICD-10-CM

## 2022-05-11 RX ORDER — EPINEPHRINE 0.3 MG/.3ML
0.3 INJECTION SUBCUTANEOUS PRN
Qty: 2 EACH | Refills: 1 | Status: SHIPPED | OUTPATIENT
Start: 2022-05-11 | End: 2024-05-03

## 2022-05-11 RX ORDER — EPINEPHRINE 0.15 MG/.15ML
0.15 INJECTION SUBCUTANEOUS PRN
Qty: 2 EACH | Refills: 1 | Status: SHIPPED | OUTPATIENT
Start: 2022-05-11 | End: 2024-03-27

## 2022-05-11 NOTE — TELEPHONE ENCOUNTER
See other phone note, rx prepped from previous entry in chart.    [facility] states they have not filled this for pt prior but are suggesting adult dose for patient as that is the standard.  I am not able to find anything in chart stating reason for lower dose of epi pen.  Please advise on sending epi pen for adult dose prepped below

## 2022-05-11 NOTE — TELEPHONE ENCOUNTER
Reason for Call: pharmacy is calling to clarify the dose on  EPINEPHrine (ADRENACLICK JR) 0.15 MG/0.15ML injection 2-pack     States this is a child's dose, would like to be sure that is what you want ordered.    Detailed comments: please call pharmacy back to clarify.    Phone Number Patient can be reached at: Other phone number:  [phone]    Best Time: any    Can we leave a detailed message on this number? YES    Call taken on 5/11/2022 at 8:39 AM by Enedina Obrien

## 2022-05-11 NOTE — TELEPHONE ENCOUNTER
"  Please call this patient to confirm the dose.  In the past, she has been prescribed the \"kid\" version of epinephrine.  Is there a reason?    Also, please confirm diagnosis.  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  "

## 2022-05-12 ENCOUNTER — ALLIED HEALTH/NURSE VISIT (OUTPATIENT)
Dept: FAMILY MEDICINE | Facility: CLINIC | Age: 62
End: 2022-05-12
Payer: MEDICARE

## 2022-05-12 DIAGNOSIS — M81.0 OSTEOPOROSIS: Primary | ICD-10-CM

## 2022-05-12 PROCEDURE — 99207 PR NO CHARGE NURSE ONLY: CPT

## 2022-05-12 PROCEDURE — 96372 THER/PROPH/DIAG INJ SC/IM: CPT | Performed by: INTERNAL MEDICINE

## 2022-05-12 NOTE — PROGRESS NOTES
1. Did you experience any problems with previous Prolia injection? no  2. Do you feel sick today?(fever, RS, GI,  issues)? no  3. Any medication changes in the last 6 months? On an antibiotic currently - bactrim and vibegron  4. Did you take prednisone or other immunosuppressant drugs in the last 6 months?(chemo, transplant, rheu, dermatology conditions)? no  5. Did you have any serious infection in the last 6 months? (pancreatitis) no  6. Any recent hospitalizations/ surgeries (especially gastric bypass, thyroid, parathyroid)? no  7. Do you plan any dental work?(especially implants and extractions) in the next 2-3 months? no  8. Did you have any fractures in the last year? no        The following steps were completed to comply with the REMS program for Prolia:  1. Ordering provider has previously reviewed information in the Medication Guide and Patient Counseling Chart, including the serious risks of Prolia  and the symptoms of each risk and have been advised to seek prompt medical attention if they have signs or symptoms of any of the serious risks.  2. Provided each patient a copy of the Medication Guide and Patient Brochure.  See MAR for administration details.   Indication: Prolia  (denosumab) is a prescription medicine used to treat osteoporosis in patients who:   Are at high risk for fracture, meaning patients who have had a fracture related to osteoporosis, or who have multiple risk factors for fracture; Cannot use another osteoporosis medicine or other osteoporosis medicines did not work well.   The timeline for early/late injections would be 4 weeks early and any time after the 6 month joe. If a patient receives their injection late, then the subsequent injection would be 6 months from the date that they actually received the injection    Have the screening questions been asked prior to this administration? Yes

## 2022-05-16 ENCOUNTER — LAB (OUTPATIENT)
Dept: LAB | Facility: CLINIC | Age: 62
End: 2022-05-16
Payer: MEDICARE

## 2022-05-16 ENCOUNTER — HOSPITAL ENCOUNTER (OUTPATIENT)
Dept: PHYSICAL THERAPY | Facility: REHABILITATION | Age: 62
Discharge: HOME | End: 2022-05-16
Payer: MEDICARE

## 2022-05-16 ENCOUNTER — ANTICOAGULATION THERAPY VISIT (OUTPATIENT)
Dept: ANTICOAGULATION | Facility: CLINIC | Age: 62
End: 2022-05-16

## 2022-05-16 DIAGNOSIS — M62.81 GENERALIZED MUSCLE WEAKNESS: ICD-10-CM

## 2022-05-16 DIAGNOSIS — Z87.81 HISTORY OF VERTEBRAL COMPRESSION FRACTURE: ICD-10-CM

## 2022-05-16 DIAGNOSIS — I26.99 PULMONARY EMBOLISM WITH INFARCTION (H): ICD-10-CM

## 2022-05-16 DIAGNOSIS — M54.41 CHRONIC BILATERAL LOW BACK PAIN WITH RIGHT-SIDED SCIATICA: ICD-10-CM

## 2022-05-16 DIAGNOSIS — M80.00XG AGE-RELATED OSTEOPOROSIS WITH CURRENT PATHOLOGICAL FRACTURE WITH DELAYED HEALING, SUBSEQUENT ENCOUNTER: ICD-10-CM

## 2022-05-16 DIAGNOSIS — R26.9 ABNORMALITY OF GAIT: ICD-10-CM

## 2022-05-16 DIAGNOSIS — G89.29 CHRONIC BILATERAL LOW BACK PAIN WITH RIGHT-SIDED SCIATICA: ICD-10-CM

## 2022-05-16 DIAGNOSIS — J44.9 CHRONIC OBSTRUCTIVE PULMONARY DISEASE, UNSPECIFIED COPD TYPE (H): Primary | ICD-10-CM

## 2022-05-16 DIAGNOSIS — R53.81 PHYSICAL DECONDITIONING: ICD-10-CM

## 2022-05-16 DIAGNOSIS — R29.818 DIFFICULTY BALANCING: ICD-10-CM

## 2022-05-16 DIAGNOSIS — R06.00 DYSPNEA, UNSPECIFIED TYPE: ICD-10-CM

## 2022-05-16 DIAGNOSIS — Z86.711 HISTORY OF PULMONARY EMBOLISM: Primary | ICD-10-CM

## 2022-05-16 LAB — INR BLD: 3.5 (ref 0.9–1.1)

## 2022-05-16 PROCEDURE — 97110 THERAPEUTIC EXERCISES: CPT | Mod: GP | Performed by: PHYSICAL THERAPIST

## 2022-05-16 PROCEDURE — 85610 PROTHROMBIN TIME: CPT

## 2022-05-16 PROCEDURE — 36416 COLLJ CAPILLARY BLOOD SPEC: CPT

## 2022-05-16 NOTE — DISCHARGE INSTRUCTIONS
"Back stretches to work on      Try not to lock knees - keep them \"soft\" and bend just as far as feels like a good stretch x5-10 reps 1-2x/day      Back and forth x5-10 reps each way 1-2x/day      X5-10 reps each side 1-2x/day    TRY TO DO just top 1/2 or bottom 1/2 of sit to stand exercise if you can do it without increase in back pain x5-10 reps 1-2x/day    KEEP up with chair leg exercises  "

## 2022-05-16 NOTE — PROGRESS NOTES
ANTICOAGULATION MANAGEMENT     Valentina Olmedo 62 year old female is on warfarin with supratherapeutic INR result. (Goal INR 2.0-3.0)    Recent labs: (last 7 days)     05/16/22  1508   INR 3.5*       ASSESSMENT       Source(s): Chart Review, Patient/Caregiver Call and Template       Warfarin doses taken: Warfarin taken as instructed    Diet: forcing herself to eat may be affecting diet and INR    And ran out of her Protein shakes 3-4 days ago.  But will be going to the store today and buy more.  She usually has 1-2 cans per day.    New illness, injury, or hospitalization: No   She reported diarrhea has not recurred.    Medication/supplement changes: None noted    Signs or symptoms of bleeding or clotting: No    Previous INR: Therapeutic last 2 visits.    Additional findings:  Called and spoke with Valentina's RN - Arlyn ([phone]).       PLAN     Recommended plan for temporary change(s) affecting INR     Dosing Instructions:   (evenings. 5mg tabs)    continue your current warfarin dose with next INR in 1-2 weeks       Summary  As of 5/16/2022    Full warfarin instructions:  5/16: 5 mg; Otherwise 5 mg every Fri; 10 mg all other days   Next INR check:  5/30/2022             Telephone call with  Valentina ([phone] ([phone])) who verbalizes understanding and agrees to plan    - also spoke with Arlyn BOO with above telephone orders.    Lab visit scheduled - INR on 5/23/22 @ [facility].    Education provided: Importance of consistent vitamin K intake, Impact of vitamin K foods on INR, Goal range and significance of current result and Monitoring for bleeding signs and symptoms    Plan made per [facility] anticoagulation protocol    Shonna Ernandez, RN  Anticoagulation Clinic  5/16/2022    _______________________________________________________________________     Anticoagulation Episode Summary     Current INR goal:  2.0-3.0   TTR:  39.4 % (1 y)   Target end date:  Indefinite   Send INR reminders to:  [facility]    Indications     History of pulmonary embolism [Z86.711]  Pulmonary embolism with infarction (H) [I26.99]           Comments:           Anticoagulation Care Providers     Provider Role Specialty Phone number    Promise Vanegas MD Referring Family Medicine [phone]    Donald Rooney MD Referring Internal Medicine [phone]

## 2022-05-17 ENCOUNTER — VIRTUAL VISIT (OUTPATIENT)
Dept: PSYCHOLOGY | Facility: CLINIC | Age: 62
End: 2022-05-17
Payer: MEDICARE

## 2022-05-17 DIAGNOSIS — F32.9 MAJOR DEPRESSIVE DISORDER, REMISSION STATUS UNSPECIFIED, UNSPECIFIED WHETHER RECURRENT: Primary | ICD-10-CM

## 2022-05-17 PROCEDURE — 90834 PSYTX W PT 45 MINUTES: CPT | Mod: 95

## 2022-05-17 NOTE — PROGRESS NOTES
"      [facility]   Mental Health & Addiction Services     Progress Note - Initial Visit    Patient  Name:  Valentina Olmedo Date: 2022         Service Type: Individual     Visit Start Time: 11:00AM  Visit End Time: 11:38    Visit #: 1    Attendees: Client attended alone    Service Modality:  Phone Visit:      Provider verified identity through the following two step process.  Patient provided:  Patient  and Patient address    The patient has been notified of the following:      \"We have found that certain health care needs can be provided without the need for a face to face visit.  This service lets us provide the care you need with a phone conversation.       I will have full access to your [facility] medical record during this entire phone call.   I will be taking notes for your medical record.      Since this is like an office visit, we will bill your insurance company for this service.       There are potential benefits and risks of telephone visits (e.g. limits to patient confidentiality) that differ from in-person visits.?Confidentiality still applies for telephone services, and nobody will record the visit.  It is important to be in a quiet, private space that is free of distractions (including cell phone or other devices) during the visit.??      If during the course of the call I believe a telephone visit is not appropriate, you will not be charged for this service\"     Consent has been obtained for this service by care team member: Yes        DATA:   Interactive Complexity: No   Crisis: No     Presenting Concerns/  Current Stressors:   Patient reports feeling stuck. She is currently in a wheelchair and spouse has not been able to push her long distances. She reports taking medication as prescribed. She states interpersonal relationship concerns and stressors surrounding communication.Patient attends psychical therapy and has past history of psychotherapy. Patient currently " has a  and psychiatrist.       ASSESSMENT:  Mental Status Assessment:  Appearance:   unable to assess due to phone visit    Eye Contact:   unabel to assess due to phone visit   Psychomotor Behavior: unabel to assess due to phone visit   Attitude:   unabel to assess due to phone visit   Orientation:   All  Speech   Rate / Production: Slow    Volume:  Normal   Mood:    Anxious   Affect:    unabel to assess due to phone visit   Thought Content:  Clear   Thought Form:  Circumstantial  Insight:    Fair       Safety Issues and Plan for Safety and Risk Management:     Holt Suicide Severity Rating Scale (Lifetime/Recent)  Holt Suicide Severity Rating (Lifetime/Recent) 10/30/2015 8/28/2021   Wish to be Dead (Lifetime) - Yes   Non-Specific Active Suicidal Thoughts (Lifetime) - Yes   Most Severe Ideation Rating (Lifetime) - 3   Frequency (Lifetime) - 4   Duration (Lifetime) - 2   Controllability (Lifetime) - 3   Protective Factors  (Lifetime) - 1   Reasons for Ideation (Lifetime) - 3   Do you have guns available to you? No -   RETIRED: 1. Wish to be Dead (Recent) - No   RETIRED: 2. Non-Specific Active Suicidal Thoughts (Recent) - No   3. Active Suicidal Ideation with any Methods (Not Plan) Without Intent to Act (Lifetime) - Yes   RETIRED: 3. Active Suicidal Ideation with any Methods (Not Plan) Without Intent to Act (Recent) - No   RETIRE: 4. Active Suicidal Ideation with Some Intent to Act, Without Specific Plan (Lifetime) - No   4. Active Suicidal Ideation with Some Intent to Act, Without Specific Plan (Recent) - No   RETIRE: 5. Active Suicidal Ideation with Specific Plan and Intent (Lifetime) - No   RETIRED: 5. Active Suicidal Ideation with Specific Plan and Intent (Recent) - No   Most Severe Ideation Rating (Past Month) - NA   Frequency (Past Month) - NA   Duration (Past Month) - NA   Controllability (Past Month) - 3   Protective Factors (Past Month) - 1   Reasons for Ideation (Past Month) - 3   Actual  Attempt (Lifetime) - No   Actual Attempt (Past 3 Months) - No   Has subject engaged in non-suicidal self-injurious behavior? (Lifetime) - No   Has subject engaged in non-suicidal self-injurious behavior? (Past 3 Months) - No   Interrupted Attempts (Lifetime) - No   Interrupted Attempts (Past 3 Months) - No   Aborted or Self-Interrupted Attempt (Lifetime) - No   Aborted or Self-Interrupted Attempt (Past 3 Months) - No   Preparatory Acts or Behavior (Lifetime) - No   Preparatory Acts or Behavior (Past 3 Months) - No   Most Recent Attempt Actual Lethality Code - NA   Most Lethal Attempt Actual Lethality Code - NA   Initial/First Attempt Actual Lethality Code - 5     Patient denies current fears or concerns for personal safety.  Patient denies current or recent suicidal ideation or behaviors.  Patient denies current or recent homicidal ideation or behaviors.  Patient denies current or recent self injurious behavior or ideation.  Patient denies other safety concerns.  Recommended that patient call 911 or go to the local ED should there be a change in any of these risk factors.  Patient reports there are no firearms in the house.     Diagnostic Criteria:  Major Depressive Disorder  CRITERIA (A-C) REPRESENT A MAJOR DEPRESSIVE EPISODE - SELECT THESE CRITERIA   - Depressed mood. Note: In children and adolescents, can be irritable mood.     - Fatigue or loss of energy.   B) The symptoms cause clinically significant distress or impairment in social, occupational, or other important areas of functioning  C) The episode is not attributable to the physiological effects of a substance or to another medical condition      DSM5 Diagnoses: (Sustained by DSM5 Criteria Listed Above)  Diagnoses: 311 (F32.9) Unspecified Depressive Disorder   Psychosocial & Contextual Factors: restricted to wheelchair   WHODAS 2.0 (12 item):   WHODAS 2.0 Total Score 8/29/2021   Total Score 40     Intervention:   Assertive- Reviewed  passive/aggressive/assertive communication styles and Educated on treatment planning and started identifying goals and interventions for treatment plan  Collateral Reports Completed:  Not Applicable      PLAN: (Homework, other):  1. Provider will continue Diagnostic Assessment.  Patient was given the following to do until next session:  Educated on treatment goals and asked to bring in examples to next session.    2. Provider recommended the following referrals: none.      3.  Suicide Risk and Safety Concerns were assessed for Valentina Olmedo.    Patient meets the following risk assessment and triage: Patient has no change in safety concerns. Committed to safety and agreed to follow previously developed safety plan.      CUBA Kapoor, LGSW   May 17, 2022      This note has been reviewed and I agree with the plan of care. This note is co-signed by Fabiana Lozoya [facility] Hospital[facility] Supervisor, on: May 18, 2022

## 2022-05-20 ENCOUNTER — TELEPHONE (OUTPATIENT)
Dept: BEHAVIORAL HEALTH | Facility: CLINIC | Age: 62
End: 2022-05-20
Payer: MEDICARE

## 2022-05-23 ENCOUNTER — HOSPITAL ENCOUNTER (OUTPATIENT)
Dept: PHYSICAL THERAPY | Facility: REHABILITATION | Age: 62
Discharge: HOME | End: 2022-05-23

## 2022-05-23 ENCOUNTER — ANTICOAGULATION THERAPY VISIT (OUTPATIENT)
Dept: ANTICOAGULATION | Facility: CLINIC | Age: 62
End: 2022-05-23

## 2022-05-23 ENCOUNTER — LAB (OUTPATIENT)
Dept: LAB | Facility: CLINIC | Age: 62
End: 2022-05-23
Payer: MEDICARE

## 2022-05-23 DIAGNOSIS — R26.9 ABNORMALITY OF GAIT: ICD-10-CM

## 2022-05-23 DIAGNOSIS — J44.9 CHRONIC OBSTRUCTIVE PULMONARY DISEASE, UNSPECIFIED COPD TYPE (H): Primary | ICD-10-CM

## 2022-05-23 DIAGNOSIS — M80.00XG AGE-RELATED OSTEOPOROSIS WITH CURRENT PATHOLOGICAL FRACTURE WITH DELAYED HEALING, SUBSEQUENT ENCOUNTER: ICD-10-CM

## 2022-05-23 DIAGNOSIS — G89.29 CHRONIC BILATERAL LOW BACK PAIN WITH RIGHT-SIDED SCIATICA: ICD-10-CM

## 2022-05-23 DIAGNOSIS — I26.99 PULMONARY EMBOLISM WITH INFARCTION (H): ICD-10-CM

## 2022-05-23 DIAGNOSIS — Z87.81 HISTORY OF VERTEBRAL COMPRESSION FRACTURE: ICD-10-CM

## 2022-05-23 DIAGNOSIS — R53.81 PHYSICAL DECONDITIONING: ICD-10-CM

## 2022-05-23 DIAGNOSIS — Z86.711 HISTORY OF PULMONARY EMBOLISM: Primary | ICD-10-CM

## 2022-05-23 DIAGNOSIS — R29.818 DIFFICULTY BALANCING: ICD-10-CM

## 2022-05-23 DIAGNOSIS — M54.41 CHRONIC BILATERAL LOW BACK PAIN WITH RIGHT-SIDED SCIATICA: ICD-10-CM

## 2022-05-23 DIAGNOSIS — R06.00 DYSPNEA, UNSPECIFIED TYPE: ICD-10-CM

## 2022-05-23 DIAGNOSIS — M62.81 GENERALIZED MUSCLE WEAKNESS: ICD-10-CM

## 2022-05-23 LAB — INR BLD: 2.4 (ref 0.9–1.1)

## 2022-05-23 PROCEDURE — 85610 PROTHROMBIN TIME: CPT

## 2022-05-23 PROCEDURE — 97110 THERAPEUTIC EXERCISES: CPT | Mod: GP | Performed by: PHYSICAL THERAPIST

## 2022-05-23 PROCEDURE — 36416 COLLJ CAPILLARY BLOOD SPEC: CPT

## 2022-05-23 NOTE — PROGRESS NOTES
ANTICOAGULATION MANAGEMENT     Valentina Olmedo 62 year old female is on warfarin with therapeutic INR result. (Goal INR 2.0-3.0)    Recent labs: (last 7 days)     05/23/22  1411   INR 2.4*       ASSESSMENT       Source(s): Chart Review, Patient/Caregiver Call and Template       Warfarin doses taken: Warfarin taken as instructed    Diet: decreased appetite is ongong may be affecting diet and INR    Drinks her protein shakes    New illness, injury, or hospitalization: No    Medication/supplement changes: None noted    Signs or symptoms of bleeding or clotting: No    Previous INR: Supratherapeutic at 3.5 on 5/16/22.    Additional findings:  Also called and left detailed message with Arlyn with [facility] ([phone])       PLAN     Recommended plan for ongoing change(s) affecting INR     Dosing Instructions:    continue your current warfarin dose with next INR in 2 weeks       Summary  As of 5/23/2022    Full warfarin instructions:  5 mg every Fri; 10 mg all other days   Next INR check:  6/6/2022             Telephone call with  Valentina ([phone]) who verbalizes understanding and agrees to plan   - LM with RAJEEV Stoddard  ([facility])  She sets up all of Valentina's medications for the week.    Lab visit scheduled - INR on 6/7/22 @ [facility].    Education provided: Importance of consistent vitamin K intake, Impact of vitamin K foods on INR and Goal range and significance of current result    Plan made per [facility] anticoagulation protocol    Shonna Ernandez RN  Anticoagulation Clinic  5/23/2022    _______________________________________________________________________     Anticoagulation Episode Summary     Current INR goal:  2.0-3.0   TTR:  40.1 % (1 y)   Target end date:  Indefinite   Send INR reminders to:  [facility]    Indications    History of pulmonary embolism [Z86.711]  Pulmonary embolism with infarction (H) [I26.99]           Comments:           Anticoagulation Care Providers     Provider Role  Specialty Phone number    Promise Vanegas MD Referring Family Medicine [phone]    Donald Rooney MD Referring Internal Medicine [phone]

## 2022-05-23 NOTE — DISCHARGE INSTRUCTIONS
HEP  Chair marching  Knee extensions  Ankle pumps    Sit to stand    Back stretches - standing forward bend, seated twist and tip over both ways    NEW standing hip abd - side kick    X5-10 reps each leg with chair behind you in case you need to sit down    Try to practice walking outside with pushing walker or chair in front and/or chair behind for 1/2 to 1 block

## 2022-05-24 ENCOUNTER — TRANSFERRED RECORDS (OUTPATIENT)
Dept: HEALTH INFORMATION MANAGEMENT | Facility: CLINIC | Age: 62
End: 2022-05-24
Payer: MEDICARE

## 2022-05-24 ENCOUNTER — TELEPHONE (OUTPATIENT)
Dept: BEHAVIORAL HEALTH | Facility: CLINIC | Age: 62
End: 2022-05-24
Payer: MEDICARE

## 2022-05-25 ENCOUNTER — VIRTUAL VISIT (OUTPATIENT)
Dept: PSYCHOLOGY | Facility: CLINIC | Age: 62
End: 2022-05-25
Payer: MEDICARE

## 2022-05-25 DIAGNOSIS — Z91.199 NO-SHOW FOR APPOINTMENT: Primary | ICD-10-CM

## 2022-05-25 DIAGNOSIS — J30.2 SEASONAL ALLERGIC RHINITIS, UNSPECIFIED TRIGGER: ICD-10-CM

## 2022-05-25 RX ORDER — FEXOFENADINE HCL 180 MG/1
180 TABLET ORAL DAILY
Qty: 30 TABLET | Refills: 1 | Status: SHIPPED | OUTPATIENT
Start: 2022-05-25 | End: 2022-05-27

## 2022-05-25 NOTE — TELEPHONE ENCOUNTER
Medication Request  Medication name: fexofenadine (ALLEGRA) 180 MG tablet  Requested Pharmacy: [place]  When was patient last seen for this?:  4/7/2022  Patient offered appointment:  N/A pharmacy sent request  Okay to leave a detailed message: no

## 2022-05-26 ENCOUNTER — TELEPHONE (OUTPATIENT)
Dept: FAMILY MEDICINE | Facility: CLINIC | Age: 62
End: 2022-05-26
Payer: MEDICARE

## 2022-05-26 NOTE — TELEPHONE ENCOUNTER
Reason for Call:  INR    Who is calling?  patient    Phone number:  [phone]    Fax number:  n/a    Name of caller: NAYLA WEST    INR Value:  UNKNOWN    Are there any other concerns:  Yes: PLEASE CALL PATIENT, PT,  STATED VERY IMPORTANT    Can we leave a detailed message on this number? YES    Phone number patient can be reached at: Home number on file [phone] (home)      Call taken on 5/26/2022 at 1:43 PM by Alison Cole

## 2022-05-26 NOTE — TELEPHONE ENCOUNTER
"Spoke with patient. She was started on Terbinafine today. Per micromedex, \"Patients prescribed terbinafine and warfarin should be monitored for signs of excessive bleeding and also for signs of decreased warfarin efficacy. Prothrombin times and international normalized ratios (INRs) should also be closely followed, and the dose of warfarin should be adjusted as necessary.\"    Recommended rechecking INR within 1 week. INR scheduled for Tuesday, 5/31.     Diana Rodriguez RN    "

## 2022-05-27 ENCOUNTER — OFFICE VISIT (OUTPATIENT)
Dept: FAMILY MEDICINE | Facility: CLINIC | Age: 62
End: 2022-05-27
Payer: MEDICARE

## 2022-05-27 ENCOUNTER — ANCILLARY PROCEDURE (OUTPATIENT)
Dept: MAMMOGRAPHY | Facility: CLINIC | Age: 62
End: 2022-05-27
Attending: FAMILY MEDICINE
Payer: MEDICARE

## 2022-05-27 VITALS
DIASTOLIC BLOOD PRESSURE: 70 MMHG | RESPIRATION RATE: 16 BRPM | BODY MASS INDEX: 45.99 KG/M2 | HEIGHT: 67 IN | SYSTOLIC BLOOD PRESSURE: 104 MMHG | HEART RATE: 88 BPM | TEMPERATURE: 98.3 F | WEIGHT: 293 LBS

## 2022-05-27 DIAGNOSIS — H10.11 SEASONAL AND PERENNIAL ALLERGIC RHINOCONJUNCTIVITIS OF RIGHT EYE: Primary | ICD-10-CM

## 2022-05-27 DIAGNOSIS — J30.2 SEASONAL AND PERENNIAL ALLERGIC RHINOCONJUNCTIVITIS OF RIGHT EYE: Primary | ICD-10-CM

## 2022-05-27 DIAGNOSIS — Z12.31 VISIT FOR SCREENING MAMMOGRAM: ICD-10-CM

## 2022-05-27 DIAGNOSIS — J30.89 SEASONAL AND PERENNIAL ALLERGIC RHINOCONJUNCTIVITIS OF RIGHT EYE: Primary | ICD-10-CM

## 2022-05-27 DIAGNOSIS — J30.2 SEASONAL ALLERGIC RHINITIS, UNSPECIFIED TRIGGER: ICD-10-CM

## 2022-05-27 PROCEDURE — 77067 SCR MAMMO BI INCL CAD: CPT

## 2022-05-27 PROCEDURE — 99213 OFFICE O/P EST LOW 20 MIN: CPT | Performed by: FAMILY MEDICINE

## 2022-05-27 RX ORDER — OLOPATADINE HYDROCHLORIDE 2 MG/ML
1 SOLUTION/ DROPS OPHTHALMIC DAILY
Qty: 2.5 ML | Refills: 5 | Status: SHIPPED | OUTPATIENT
Start: 2022-05-27 | End: 2024-03-27

## 2022-05-27 RX ORDER — FEXOFENADINE HCL 180 MG/1
180 TABLET ORAL DAILY
Qty: 30 TABLET | Refills: 1 | Status: SHIPPED | OUTPATIENT
Start: 2022-05-27 | End: 2022-08-18

## 2022-05-27 RX ORDER — TERBINAFINE HYDROCHLORIDE 250 MG/1
250 TABLET ORAL DAILY
COMMUNITY
Start: 2022-05-24 | End: 2023-02-17

## 2022-05-27 ASSESSMENT — PATIENT HEALTH QUESTIONNAIRE - PHQ9
SUM OF ALL RESPONSES TO PHQ QUESTIONS 1-9: 7
SUM OF ALL RESPONSES TO PHQ QUESTIONS 1-9: 7
10. IF YOU CHECKED OFF ANY PROBLEMS, HOW DIFFICULT HAVE THESE PROBLEMS MADE IT FOR YOU TO DO YOUR WORK, TAKE CARE OF THINGS AT HOME, OR GET ALONG WITH OTHER PEOPLE: NOT DIFFICULT AT ALL

## 2022-05-27 ASSESSMENT — ENCOUNTER SYMPTOMS: EYE PAIN: 1

## 2022-05-27 NOTE — PROGRESS NOTES
Problem List Items Addressed This Visit     Seasonal and perennial allergic rhinoconjunctivitis of right eye - Primary     Ongoing symptoms.  She has been using Flonase.  She does not recall that she tried Allegra.  Symptoms have been worse over the past few weeks.  She thinks there is a seasonal component.  Trial of ocular mast cell stabilizer (Pataday).  I also suggested that she check in with her ophthalmologist.           Relevant Medications    olopatadine (PATADAY) 0.2 % ophthalmic solution    fexofenadine (ALLEGRA) 180 MG tablet          Magda Montgomery is a 62 year old who presents for the following health issues  accompanied by her spouse.  Chief Complaint   Patient presents with     Eye Problem     RT. Drainage and itching. Sx started 2-4 weeks ago.      Right eye:   - blurrs, hill.  She has known cataracts.     - she has environmental allergies.  This is includes some common flowers, cottonwood.     Eye Problem     History of Present Illness       Reason for visit:  My right eye hurts hill itches blurs up and my my left eye starts up sometimes    She eats 2-3 servings of fruits and vegetables daily.She consumes 1 sweetened beverage(s) daily.She exercises with enough effort to increase her heart rate 9 or less minutes per day.  She exercises with enough effort to increase her heart rate 3 or less days per week.   She is taking medications regularly.    Today's PHQ-9         PHQ-9 Total Score: 7    PHQ-9 Q9 Thoughts of better off dead/self-harm past 2 weeks :   More than half the days  Thoughts of suicide or self harm: (P) No  Self-harm Plan:     Self-harm Action:       Safety concerns for self or others: (P) Yes    How difficult have these problems made it for you to do your work, take care of things at home, or get along with other people: Not difficult at all     Review of Systems   Eyes: Positive for pain.            Objective    /70 (BP Location: Left arm, Patient Position: Sitting,  "Cuff Size: Adult Large)   Pulse 88   Temp 98.3  F (36.8  C) (Oral)   Resp 16   Ht 1.689 m (5' 6.5\")   Wt (!) 155.7 kg (343 lb 4 oz)   LMP  (LMP Unknown)   BMI 54.57 kg/m    Body mass index is 54.57 kg/m .  Physical Exam  Nursing note reviewed.   Constitutional:       General: She is not in acute distress.     Appearance: Normal appearance. She is not ill-appearing.   HENT:      Head: Normocephalic and atraumatic.   Eyes:      General: No scleral icterus.        Right eye: No discharge.         Left eye: No discharge.      Extraocular Movements: Extraocular movements intact.      Conjunctiva/sclera: Conjunctivae normal.      Pupils: Pupils are equal, round, and reactive to light.   Pulmonary:      Effort: Pulmonary effort is normal.   Neurological:      Mental Status: She is alert and oriented to person, place, and time.   Psychiatric:         Attention and Perception: Attention normal.         Mood and Affect: Mood normal.         Speech: Speech normal.         Thought Content: Thought content normal.                  This note has been dictated using voice recognition software. Any grammatical or context distortions are unintentional and inherent to the software        "

## 2022-05-27 NOTE — LETTER
May 27, 2022      Valentina Olmedo  [address]   [place] [address]        To Whom It May Concern:    Valentina Olmedo was seen on 5/27/2022.  She is currently participating in a physical therapy program.  She has been seen by Rhonda Garcia PT on 4/25, 5/2. 5/16, 5/23.  This individual plans to continue to participate in physical therapy.         Sincerely,        Jose Maria Morin MD

## 2022-05-27 NOTE — ASSESSMENT & PLAN NOTE
Ongoing symptoms.  She has been using Flonase.  She does not recall that she tried Allegra.  Symptoms have been worse over the past few weeks.  She thinks there is a seasonal component.  Trial of ocular mast cell stabilizer (Pataday).  I also suggested that she check in with her ophthalmologist.

## 2022-05-31 ENCOUNTER — LAB (OUTPATIENT)
Dept: LAB | Facility: CLINIC | Age: 62
End: 2022-05-31
Payer: MEDICARE

## 2022-05-31 ENCOUNTER — NURSE TRIAGE (OUTPATIENT)
Dept: NURSING | Facility: CLINIC | Age: 62
End: 2022-05-31

## 2022-05-31 ENCOUNTER — ANTICOAGULATION THERAPY VISIT (OUTPATIENT)
Dept: ANTICOAGULATION | Facility: CLINIC | Age: 62
End: 2022-05-31

## 2022-05-31 DIAGNOSIS — I26.99 PULMONARY EMBOLISM WITH INFARCTION (H): ICD-10-CM

## 2022-05-31 DIAGNOSIS — Z86.711 HISTORY OF PULMONARY EMBOLISM: Primary | ICD-10-CM

## 2022-05-31 LAB — INR BLD: 2.3 (ref 0.9–1.1)

## 2022-05-31 PROCEDURE — 36416 COLLJ CAPILLARY BLOOD SPEC: CPT

## 2022-05-31 PROCEDURE — 85610 PROTHROMBIN TIME: CPT

## 2022-05-31 NOTE — TELEPHONE ENCOUNTER
I tried calling home health but no answer and mailbox was full so I could not leave a message.  Looks like INR therapeutic today.

## 2022-05-31 NOTE — PROGRESS NOTES
ANTICOAGULATION MANAGEMENT     Valentina Olmedo 62 year old female is on warfarin with therapeutic INR result. (Goal INR 2.0-3.0)    Recent labs: (last 7 days)     05/31/22  1327   INR 2.3*       ASSESSMENT       Source(s): Chart Review, Patient/Caregiver Call and Template       Warfarin doses taken: Template incorrect; verbally confirmed home dose with Valentina Montgomery taking more than ordered.  She reported, she has been taking this dose for a while now.   Will have her continue same dose and will update Anticoag. Calendar.    Diet: No new diet changes identified    Continues to drink her protein shakes.    New illness, injury, or hospitalization: No    Medication/supplement changes: None noted    Signs or symptoms of bleeding or clotting: No    Previous INR: Therapeutic last visit at 2.4; previously outside of goal range at 3.5    Additional findings:  Called Valentina's RN who sets up her medications - and LM with RAJEEV Stoddard with [facility] cell [phone]       PLAN     Recommended plan for ongoing change(s) affecting INR     Dosing Instructions:    continue your current warfarin dose with next INR in 3 weeks       Summary  As of 5/31/2022    Full warfarin instructions:  5 mg every Mon, Fri; 10 mg all other days   Next INR check:  6/14/2022             Telephone call with  Valentina ([phone]) who verbalizes understanding and agrees to plan    Lab visit scheduled - INR on 6/7/22 @ [facility].    Education provided: Importance of consistent vitamin K intake, Impact of vitamin K foods on INR, Goal range and significance of current result and Importance of taking warfarin as instructed    Plan made per [facility] anticoagulation protocol    Shonna Ernandez, RN  Anticoagulation Clinic  5/31/2022    _______________________________________________________________________     Anticoagulation Episode Summary     Current INR goal:  2.0-3.0   TTR:  40.4 % (1 y)   Target end date:  Indefinite   Send INR reminders to:  [facility]    Indications    History of pulmonary embolism [Z86.711]  Pulmonary embolism with infarction (H) [I26.99]           Comments:           Anticoagulation Care Providers     Provider Role Specialty Phone number    Promise Vanegas MD Referring Family Medicine [phone]    Donald Rooney MD Referring Internal Medicine [phone]

## 2022-05-31 NOTE — TELEPHONE ENCOUNTER
Nurse Triage SBAR    Is this a 2nd Level Triage? YES    Situation/Background: Caller is requesting a verbal order for patient's coumadin dosing. the change. Caller stated the client texted the patient that she had a change in her Coumadin order.    Recommendation: Per disposition, routed the encounter to PCP Care Team [place] to address. Caller may be reached at [phone].    Protocol Recommended Disposition: Paged/Called Provider    Dionna Garcia RN on 5/31/2022 at 2:47 PM    Additional Information    Nursing judgment    Protocols used: NO PROTOCOL AVAILABLE - INFORMATION ONLY-A-[place]

## 2022-06-01 ENCOUNTER — MEDICAL CORRESPONDENCE (OUTPATIENT)
Dept: HEALTH INFORMATION MANAGEMENT | Facility: CLINIC | Age: 62
End: 2022-06-01
Payer: MEDICARE

## 2022-06-01 ENCOUNTER — TELEPHONE (OUTPATIENT)
Dept: FAMILY MEDICINE | Facility: CLINIC | Age: 62
End: 2022-06-01
Payer: MEDICARE

## 2022-06-01 DIAGNOSIS — I26.99 PULMONARY EMBOLISM WITH INFARCTION (H): ICD-10-CM

## 2022-06-01 DIAGNOSIS — Z86.711 HISTORY OF PULMONARY EMBOLISM: Primary | ICD-10-CM

## 2022-06-01 NOTE — TELEPHONE ENCOUNTER
Spoke with Yasemin WHITAKER LPN with [facility]     - RAJEEV Stoddard left home care.     - new nurse to set up Valentina's meds weekly is - Yasemin TRAN  ([phone])

## 2022-06-01 NOTE — TELEPHONE ENCOUNTER
Katt TRAN nurse from [facility] called and left a message on the home care voicemail line for warfarin dosing instructions for patient.  She requested a vm be left if she does not answer and leave name so she can get a verbal order.    Routing to [place].    CHRIS CespedesN, RN  Anticoagulation Clinic

## 2022-06-01 NOTE — TELEPHONE ENCOUNTER
Unable to reach home care number on file, voice mail has been full since yesterday.  I did Call Valentina she has been informed INR therapeutic and continue same dose.  She agreed and voiced understanding

## 2022-06-02 DIAGNOSIS — G47.33 OBSTRUCTIVE SLEEP APNEA (ADULT) (PEDIATRIC): Primary | ICD-10-CM

## 2022-06-07 ENCOUNTER — LAB (OUTPATIENT)
Dept: LAB | Facility: CLINIC | Age: 62
End: 2022-06-07
Payer: MEDICARE

## 2022-06-07 ENCOUNTER — HOSPITAL ENCOUNTER (OUTPATIENT)
Dept: PHYSICAL THERAPY | Facility: REHABILITATION | Age: 62
Discharge: HOME | End: 2022-06-07
Payer: MEDICARE

## 2022-06-07 ENCOUNTER — ANTICOAGULATION THERAPY VISIT (OUTPATIENT)
Dept: ANTICOAGULATION | Facility: CLINIC | Age: 62
End: 2022-06-07

## 2022-06-07 DIAGNOSIS — R06.00 DYSPNEA, UNSPECIFIED TYPE: ICD-10-CM

## 2022-06-07 DIAGNOSIS — R29.818 DIFFICULTY BALANCING: ICD-10-CM

## 2022-06-07 DIAGNOSIS — R53.81 PHYSICAL DECONDITIONING: ICD-10-CM

## 2022-06-07 DIAGNOSIS — R26.9 ABNORMALITY OF GAIT: ICD-10-CM

## 2022-06-07 DIAGNOSIS — I26.99 PULMONARY EMBOLISM WITH INFARCTION (H): ICD-10-CM

## 2022-06-07 DIAGNOSIS — J44.9 CHRONIC OBSTRUCTIVE PULMONARY DISEASE, UNSPECIFIED COPD TYPE (H): Primary | ICD-10-CM

## 2022-06-07 DIAGNOSIS — M54.41 CHRONIC BILATERAL LOW BACK PAIN WITH RIGHT-SIDED SCIATICA: ICD-10-CM

## 2022-06-07 DIAGNOSIS — Z86.711 HISTORY OF PULMONARY EMBOLISM: Primary | ICD-10-CM

## 2022-06-07 DIAGNOSIS — M62.81 GENERALIZED MUSCLE WEAKNESS: ICD-10-CM

## 2022-06-07 DIAGNOSIS — G89.29 CHRONIC BILATERAL LOW BACK PAIN WITH RIGHT-SIDED SCIATICA: ICD-10-CM

## 2022-06-07 LAB — INR BLD: 2 (ref 0.9–1.1)

## 2022-06-07 PROCEDURE — 36416 COLLJ CAPILLARY BLOOD SPEC: CPT

## 2022-06-07 PROCEDURE — 97112 NEUROMUSCULAR REEDUCATION: CPT | Mod: GP | Performed by: PHYSICAL THERAPIST

## 2022-06-07 PROCEDURE — 97110 THERAPEUTIC EXERCISES: CPT | Mod: GP | Performed by: PHYSICAL THERAPIST

## 2022-06-07 PROCEDURE — 85610 PROTHROMBIN TIME: CPT

## 2022-06-07 NOTE — PROGRESS NOTES
ANTICOAGULATION MANAGEMENT     Valentina Olmedo 62 year old female is on warfarin with therapeutic INR result. (Goal INR 2.0-3.0)    Recent labs: (last 7 days)     06/07/22  1158   INR 2.0*       ASSESSMENT       Source(s): Chart Review and Patient/Caregiver Call       Warfarin doses taken: Less warfarin taken than planned which may be affecting INR    Diet: No new diet changes identified    New illness, injury, or hospitalization: No    Medication/supplement changes: None noted    Signs or symptoms of bleeding or clotting: No    Previous INR: Therapeutic last 2(+) visits    Additional findings: inr trending down and dose recently changed. Valentina is concerned by this and we will increase her dose a bit today     Called to Yasemin [phone] and    RAJEEV Stoddard.- cell [phone] community nurses         PLAN     Recommended plan for no diet, medication or health factor changes affecting INR     Dosing Instructions: Increase your warfarin dose (8.3% change) with next INR in 1 week       Summary  As of 6/7/2022    Full warfarin instructions:  5 mg every Mon; 10 mg all other days   Next INR check:  6/14/2022             Telephone call with Valentina who verbalizes understanding and agrees to plan    Lab visit scheduled    Education provided: None required    Plan made per [facility] anticoagulation protocol    Gustavo Gamez RN  Anticoagulation Clinic  6/7/2022    _______________________________________________________________________     Anticoagulation Episode Summary     Current INR goal:  2.0-3.0   TTR:  42.3 % (1 y)   Target end date:  Indefinite   Send INR reminders to:  [facility]    Indications    History of pulmonary embolism [Z86.711]  Pulmonary embolism with infarction (H) [I26.99]           Comments:           Anticoagulation Care Providers     Provider Role Specialty Phone number    Promise Vanegas MD Referring Family Medicine [phone]    Donald Rooney MD Referring Internal Medicine [phone]

## 2022-06-07 NOTE — TELEPHONE ENCOUNTER
Provider reviewed and signed.     Writer faxed back to [facility] @ [phone] as requested.     Transmission confirmed as Successful. '    Paperwork labeled and placed in scanning to be included in chart.         No further action is needed at this time.

## 2022-06-07 NOTE — DISCHARGE INSTRUCTIONS
ADD single leg balance to home exercises      Do in corner in kitchen so feel safe performing - try to balance for 5-10 seconds each leg for 5 reps on each side  1-2x/day    CHANGE seated march to standing march    X10 reps each leg 1-2x/day    CHANGE ankle pumps to heel raises    X10 reps 1-2x/day    Keep doing standing sideways kick and sit to stand exercises    Keep practicing walking!!

## 2022-06-14 ENCOUNTER — LAB (OUTPATIENT)
Dept: LAB | Facility: CLINIC | Age: 62
End: 2022-06-14
Payer: MEDICARE

## 2022-06-14 ENCOUNTER — ANTICOAGULATION THERAPY VISIT (OUTPATIENT)
Dept: ANTICOAGULATION | Facility: CLINIC | Age: 62
End: 2022-06-14

## 2022-06-14 DIAGNOSIS — I26.99 PULMONARY EMBOLISM WITH INFARCTION (H): ICD-10-CM

## 2022-06-14 DIAGNOSIS — Z86.711 HISTORY OF PULMONARY EMBOLISM: Primary | ICD-10-CM

## 2022-06-14 LAB — INR BLD: 2.6 (ref 0.9–1.1)

## 2022-06-14 PROCEDURE — 85610 PROTHROMBIN TIME: CPT

## 2022-06-14 PROCEDURE — 36416 COLLJ CAPILLARY BLOOD SPEC: CPT

## 2022-06-14 NOTE — PROGRESS NOTES
ANTICOAGULATION MANAGEMENT     Valentina Olmedo 62 year old female is on warfarin with therapeutic INR result. (Goal INR 2.0-3.0)    Recent labs: (last 7 days)     06/14/22  1338   INR 2.6*       ASSESSMENT       Source(s): Chart Review, Patient/Caregiver Call and Template       Warfarin doses taken: Warfarin taken as instructed    Diet: continues with poor appetite may be affecting diet and INR    Continues to drink her protein shakes to supplement her meals.    New illness, injury, or hospitalization: No    Medication/supplement changes: None noted    Signs or symptoms of bleeding or clotting: No    Previous INR: Therapeutic last 3 visits    Additional findings: Called new nurse - Yasemin WHITAKER LPN   (cell# [phone]).   - she sets up medications weekly for Valentina.       PLAN     Recommended plan for ongoing change(s) affecting INR     Dosing Instructions:   (evenings. 5mg tabs)   continue your current warfarin dose with next INR in 4 weeks       Summary  As of 6/14/2022    Full warfarin instructions:  5 mg every Mon; 10 mg all other days   Next INR check:  7/12/2022             Telephone call with  Valentina ([phone]) who verbalizes understanding and agrees to plan.  - also spoke with  Yasemin WHITAKER LPN   (cell# [phone]).    Lab visit scheduled - INR on 7/7/22 @ [facility]   Also scheduled for PT    Education provided: Importance of consistent vitamin K intake, Goal range and significance of current result and Importance of notifying clinic for diarrhea, nausea/vomiting, reduced intake, and/or illness; a sooner lab recheck maybe needed.    Plan made per [facility] anticoagulation protocol    Shonna Ernandez RN  Anticoagulation Clinic  6/14/2022    _______________________________________________________________________     Anticoagulation Episode Summary     Current INR goal:  2.0-3.0   TTR:  44.2 % (1 y)   Target end date:  Indefinite   Send INR reminders to:  [facility]    Indications    History of pulmonary embolism  [Z86.711]  Pulmonary embolism with infarction (H) [I26.99]           Comments:           Anticoagulation Care Providers     Provider Role Specialty Phone number    Promise Vanegas MD Referring Family Medicine [phone]    Donald Rooney MD Referring Internal Medicine [phone]

## 2022-06-17 ENCOUNTER — TELEPHONE (OUTPATIENT)
Dept: BEHAVIORAL HEALTH | Facility: CLINIC | Age: 62
End: 2022-06-17
Payer: MEDICARE

## 2022-06-20 ENCOUNTER — MEDICAL CORRESPONDENCE (OUTPATIENT)
Dept: HEALTH INFORMATION MANAGEMENT | Facility: CLINIC | Age: 62
End: 2022-06-20

## 2022-06-21 ENCOUNTER — MEDICAL CORRESPONDENCE (OUTPATIENT)
Dept: BEHAVIORAL HEALTH | Facility: CLINIC | Age: 62
End: 2022-06-21

## 2022-06-22 ENCOUNTER — TELEPHONE (OUTPATIENT)
Dept: BEHAVIORAL HEALTH | Facility: CLINIC | Age: 62
End: 2022-06-22

## 2022-06-22 ENCOUNTER — MYC REFILL (OUTPATIENT)
Dept: BEHAVIORAL HEALTH | Facility: CLINIC | Age: 62
End: 2022-06-22

## 2022-06-22 DIAGNOSIS — I10 ESSENTIAL HYPERTENSION: ICD-10-CM

## 2022-06-22 DIAGNOSIS — F25.1 SCHIZOAFFECTIVE DISORDER, DEPRESSIVE TYPE (H): ICD-10-CM

## 2022-06-22 RX ORDER — HYDROCHLOROTHIAZIDE 25 MG/1
25 TABLET ORAL DAILY
Qty: 90 TABLET | Refills: 3 | Status: SHIPPED | OUTPATIENT
Start: 2022-06-22 | End: 2023-02-17

## 2022-06-22 NOTE — TELEPHONE ENCOUNTER
Medication Request  Medication name: hydrochlorothiazide (HYDRODIURIL) 25 MG tablet  Requested Pharmacy: [facility]   When was patient last seen for this?:  5/27/22  Patient offered appointment:  No  Okay to leave a detailed message: no

## 2022-06-22 NOTE — TELEPHONE ENCOUNTER
Home Health orders received, reviewed by Dr Puente, faxed to [facility]. Hard copy sent to medical records.

## 2022-06-22 NOTE — TELEPHONE ENCOUNTER
Date of Last Office Visit: 4/19/22  Date of Next Office Visit: 8/2/22  No shows since last visit: 0  Cancellations since last visit: 0    Medication requested: DULoxetine (CYMBALTA) 60 MG capsule Date last ordered: 1/11/22 Qty: 180 Refills: 1    Medication requested: QUEtiapine (SEROQUEL) 200 MG tablet Date last ordered: 1/11/22 Qty: 90 Refills: 1       Review of [place] ?: NA    Lapse in medication adherence greater than 5 days?: NO requesting early for shipping    Medication refill request verified as identical to current order?: yes  Result of Last DAM, VPA, Li+ Level, CBC, or Carbamazepine Level (at or since last visit): N/A    Last visit treatment plan: 1. Schizoaffective disorder, depressive type (H)  Reports mood stable, denies depression.   Plan:   Continue Seroquel 200 mg nightly, Cymbalta 120 mg daily.    Return in about 3 mos.-in-person appointment.    []Medication refilled per  Medication Refill in Ambulatory Care  policy.  [x]Medication unable to be refilled by RN due to criteria not met as indicated below:    []Eligibility - not seen in the last year   []Supervision - no future appointment   []Compliance - no shows, cancellations or lapse in therapy   []Verification - order discrepancy   []Controlled medication   [x]Medication not included in policy   []90-day supply request   [x]Other- requesting early for shipping

## 2022-06-28 RX ORDER — DULOXETIN HYDROCHLORIDE 60 MG/1
120 CAPSULE, DELAYED RELEASE ORAL DAILY
Qty: 180 CAPSULE | Refills: 3 | Status: SHIPPED | OUTPATIENT
Start: 2022-06-28 | End: 2023-05-16

## 2022-06-28 RX ORDER — QUETIAPINE FUMARATE 200 MG/1
200 TABLET, FILM COATED ORAL AT BEDTIME
Qty: 90 TABLET | Refills: 3 | Status: SHIPPED | OUTPATIENT
Start: 2022-06-28 | End: 2022-11-01

## 2022-07-05 ENCOUNTER — TRANSFERRED RECORDS (OUTPATIENT)
Dept: HEALTH INFORMATION MANAGEMENT | Facility: CLINIC | Age: 62
End: 2022-07-05

## 2022-07-07 ENCOUNTER — LAB (OUTPATIENT)
Dept: LAB | Facility: CLINIC | Age: 62
End: 2022-07-07
Payer: MEDICARE

## 2022-07-07 ENCOUNTER — NURSE TRIAGE (OUTPATIENT)
Dept: NURSING | Facility: CLINIC | Age: 62
End: 2022-07-07

## 2022-07-07 ENCOUNTER — HOSPITAL ENCOUNTER (OUTPATIENT)
Dept: PHYSICAL THERAPY | Facility: REHABILITATION | Age: 62
Discharge: HOME | End: 2022-07-07
Payer: MEDICARE

## 2022-07-07 ENCOUNTER — TELEPHONE (OUTPATIENT)
Dept: FAMILY MEDICINE | Facility: CLINIC | Age: 62
End: 2022-07-07

## 2022-07-07 DIAGNOSIS — R06.00 DYSPNEA, UNSPECIFIED TYPE: ICD-10-CM

## 2022-07-07 DIAGNOSIS — I26.99 PULMONARY EMBOLISM WITH INFARCTION (H): ICD-10-CM

## 2022-07-07 DIAGNOSIS — Z86.711 HISTORY OF PULMONARY EMBOLISM: Primary | ICD-10-CM

## 2022-07-07 DIAGNOSIS — R29.818 DIFFICULTY BALANCING: ICD-10-CM

## 2022-07-07 DIAGNOSIS — Z87.81 HISTORY OF VERTEBRAL COMPRESSION FRACTURE: ICD-10-CM

## 2022-07-07 DIAGNOSIS — R26.9 ABNORMALITY OF GAIT: ICD-10-CM

## 2022-07-07 DIAGNOSIS — M54.41 CHRONIC BILATERAL LOW BACK PAIN WITH RIGHT-SIDED SCIATICA: ICD-10-CM

## 2022-07-07 DIAGNOSIS — R53.81 PHYSICAL DECONDITIONING: ICD-10-CM

## 2022-07-07 DIAGNOSIS — R05.9 COUGH: ICD-10-CM

## 2022-07-07 DIAGNOSIS — J44.9 CHRONIC OBSTRUCTIVE PULMONARY DISEASE, UNSPECIFIED COPD TYPE (H): Primary | ICD-10-CM

## 2022-07-07 DIAGNOSIS — M62.81 GENERALIZED MUSCLE WEAKNESS: ICD-10-CM

## 2022-07-07 DIAGNOSIS — M80.00XG AGE-RELATED OSTEOPOROSIS WITH CURRENT PATHOLOGICAL FRACTURE WITH DELAYED HEALING, SUBSEQUENT ENCOUNTER: ICD-10-CM

## 2022-07-07 DIAGNOSIS — G89.29 CHRONIC BILATERAL LOW BACK PAIN WITH RIGHT-SIDED SCIATICA: ICD-10-CM

## 2022-07-07 LAB — INR BLD: 2 (ref 0.9–1.1)

## 2022-07-07 PROCEDURE — 97110 THERAPEUTIC EXERCISES: CPT | Mod: GP | Performed by: PHYSICAL THERAPIST

## 2022-07-07 PROCEDURE — 36416 COLLJ CAPILLARY BLOOD SPEC: CPT

## 2022-07-07 PROCEDURE — 85610 PROTHROMBIN TIME: CPT

## 2022-07-07 NOTE — TELEPHONE ENCOUNTER
Home care lpn calling.  Sees her weekly for skilled nurse visit.  Ha x 1 week.  Tylenol not working.    Not with caller/unable to triage and assess patient. Will go to Walk In Clinic    Also taking oxybutain.  Now having urges to go to bathroom.    Needs to be seen today in clinic or Walk In Clinic     Warm transferred to scheduling    Iman Keane RN  [facility] Nurse Advisor

## 2022-07-07 NOTE — TELEPHONE ENCOUNTER
Please let patient know that I placed an order for a COVID test which could be processed at the same time as the INR next week.

## 2022-07-07 NOTE — TELEPHONE ENCOUNTER
ANTICOAGULATION MANAGEMENT     Valentina Olmedo 62 year old female is on warfarin with therapeutic INR result. (Goal INR 2.0 - 3.0 )    Recent labs: (last 7 days)     07/07/22  1250   INR 2.0*       ASSESSMENT       Source(s): Chart Review, Patient/Caregiver Call and Template       Warfarin doses taken: Warfarin taken as instructed    Diet: No new diet changes identified    New illness, injury, or hospitalization: Yes:    Reported migraine headache for 3 days last week.     Medication/supplement changes:  Yes.    Will start Terbinafine for 6 weeks, from 7/7 - 8/18/22, (known to interact with warfarin to increase risk for bleeding).    Signs or symptoms of bleeding or clotting: No    Previous INR: Therapeutic last 4 visits    Additional findings:  Reported a patient coughed right to her face and concerned about getting covid.       PLAN     Recommended plan for ongoing change(s) affecting INR     Dosing Instructions: continue your current warfarin dose with next INR in 1 week       Summary  As of 7/7/2022    Full warfarin instructions:  5 mg every Mon; 10 mg all other days   Next INR check:  7/14/2022             Telephone call with  Valentina ([phone]) who verbalizes understanding and agrees to plan    Lab visit scheduled - INR on 7/13/22 @ [facility].    Education provided: Importance of consistent vitamin K intake, Goal range and significance of current result, Potential interaction between warfarin and Terbinafine and Monitoring for bleeding signs and symptoms    Plan made per [facility] anticoagulation protocol    Shonna Ernandez, RN  Anticoagulation Clinic  7/7/2022    _______________________________________________________________________     Anticoagulation Episode Summary     Current INR goal:  2.0-3.0   TTR:  47.1 % (1 y)   Target end date:  Indefinite   Send INR reminders to:  [facility]    Indications    History of pulmonary embolism [Z86.711]  Pulmonary embolism with infarction (H) [I26.99]            Comments:           Anticoagulation Care Providers     Provider Role Specialty Phone number    Promise Vanegas MD Referring Family Medicine [phone]    Donald Rooney MD Referring Internal Medicine [phone]

## 2022-07-07 NOTE — TELEPHONE ENCOUNTER
Dr. Starr,     - Valentina is requesting a Covid-19 test when she comes in next week for her INR appt on 7/13/22.      - she reported while at [facility] for appt today, a patient was coughing and coughing, coughed right into her face.  Now she is concerned about getting Covid.     - she said she is all boostered up with her Covid vaccinations.     - please advise or put in a future order to test for Covid.

## 2022-07-07 NOTE — TELEPHONE ENCOUNTER
Reason for call:  Other   Patient called regarding (reason for call): call back  Additional comments: Patient is calling to report she has started a new medication. Medication is terbinafine 250mg, patient will take for 6 weeks, 1 tablet daily. Patient started medication today 7/7/22 and was prescribed by Dr Abraham Hilton for a toenail issue. Patient would like call back.     Phone number to reach patient:  Home number on file [phone] (home)    Best Time:  Any time    Can we leave a detailed message on this number?  YES    Travel screening: Not Applicable

## 2022-07-07 NOTE — TELEPHONE ENCOUNTER
- Valentina prescribed Terbinafine for 6 wks by Dr. Abraham Hilton, from 7/7 - 8/18/22.      - Concurrent use of TERBINAFINE and WARFARIN may result in an alteration of warfarin efficacy and increase risk for bleeding.     - will monitor INR closely and adjust dose based on INR results.     - Valentina is scheduled for INR today - will await results.

## 2022-07-10 NOTE — PROGRESS NOTES
[facility]    OUTPATIENT PHYSICAL THERAPY  PLAN OF TREATMENT FOR OUTPATIENT REHABILITATION AND PROGRESS NOTE           Patient's Last Name, First Name, Valentina Nesbitt Date of Birth  1960   Provider's Name  [facility] Medical Record No.  [id number]    Onset Date  3/15/22 Start of Care Date  4/25/22   Type:     _X_PT   ___OT   ___SLP Medical Diagnosis  COPD with dyspnea   PT Diagnosis  Physical deconditioning with dyspnea Plan of Treatment  Frequency/Duration: 1x/every 2-4 weeks  Certification date from 7/7/22 to 10/4/22     Goals:see below         I CERTIFY THE NEED FOR THESE SERVICES FURNISHED UNDER        THIS PLAN OF TREATMENT AND WHILE UNDER MY CARE     (Physician co-signature of this document indicates review and certification of the therapy plan).              Referring Provider: Dr. Sen Garcia, PT, DPT, OCS, CLT       07/07/22 1300   Signing Clinician's Name / Credentials   Signing clinician's name / credentials Griselda Garcia PT, DPT, OCS, CLT   Session Number   Session Number 5/12   Progress Note/Recertification   Recertification Due Date 07/24/22   Recertification Completed Date  07/07/22   Adult Goals   PT Eval Goals 1;2;3   Goal 1   Goal Description Pt will be able to walk outside with 4WW with minimal SOB and difficulty for improved quality of physical condition in 90 days.   Goal Progress overall pt has had better amount with walking with and without walker for family gatherings but over past week less walking due to migraine and leftover headache   Target Date 10/04/22   Goal 2   Goal Description Pt will be able to improve her walking distance with 2 minute walking test to improve her aerobic capacity towards age/gender norms for improved QOL in 90 days.   Goal Progress overall improved as pt has been walking  a significantly more amount than when she started PT but over past week pt hasn't walked as much due to migraine   Target Date 10/04/22   Subjective Report   Subjective Report Pt reports doing pretty good since last visit. Pt did have a 3 day migraine that she was struggling with not having anything helpful to take for medicine - tylenol didn't help. Pt si going to come see MD in a couple of weeks to discuss this. Pt has been going outside a lot - she reports usually walking with her wheelchair so its there if she feels like she needs it. Pt reports she has had some dizziness since she had her migraine so she hasn't walked as much this past week. Pt has been resting more with a little of a remaining headache and dizziness. Pain rating today about 3/10 for headache level with rest of the body rated 1/10 and body pain has been pretty good. Pt reports HEP is going pretty good - pt can get a little soreness in her hip or back with hip abd/add but mostly going well. Pt has been doing pretty good with minimal SOB except on the hotter humid days. Pt continues to use her daily inhaler and a rescue inhaler if needed.   Objective Measures   Objective Measures Objective Measure 1;Objective Measure 2;Objective Measure 3;Objective Measure 4   Objective Measure 1   Objective Measure Gait - from last visit   Details 2 minute walk test - 326 ft witthout assistive device and with mild SOB that she did not feel the need to use her inhaler for - pt reports her legs felt weak for at least the last 25% of the time - pt's gait quality has significant B trendelenberg and lateral trunk lean as she fatigues - VITALS HR 94 1 minute afterwards and O2 sats 94-96% FROM LAST VISITS pt went 84 feet in 1 min 18 seconds with 2WW, pt had a 3 minute break and then was able to do a return walk of 84 feet in 55 seconds (last week pt was able to do 109 ft without walker in slightly over 1 minute)   Objective Measure 2   Objective Measure ROM   Details  Neck ROM WNL except extension increases pt's dizziness and B SB min limited and has contralateral tightness both ways and pulls up into side of ear and headache   Objective Measure 3   Objective Measure Strength   Details B UE grossly 5/5 without difficulty, B quad and DF 5/5 without difficulty, B hip flexor 4/5 with difficulty - no SOB with strength testing   Objective Measure 4   Objective Measure ROM   Details Shoulder ROM B WNL without pain or difficulty   Treatment Interventions   Interventions Therapeutic Procedure/Exercise;Neuromuscular Re-education   Therapeutic Procedure/exercise   Therapeutic Procedures: strength, endurance, ROM, flexibillity minutes (70480) 38   Skilled Intervention Reassessed response to activity level and HEP since last visit with recent migraine decreasing her ability to perform regular walking and HEP. Reassessed neck ROM and B UE and LE strength and discussed results.  Performed seated ROM and strengthening exercise with review as pt requested to not perform walking due to some continued dizziness with headache - written notes pt instructions and printed AVS for entire HEP list.   Patient Response Shoulder abd with minimal fatigue, B hip flexor march tiring and hard to do 10 reps, LAQ without difficulty, Ankle pumps without difficulty   Treatment Detail Performed seated shoulder abd x10 reps, seated hip marching x10 reps each, seated LAQ x10 reps each, seated toe and heel raises x10 reps each   Education   Learner Patient   Readiness Eager   Method Booklet/handout   Response Verbalizes Understanding   Plan   Home program HEP - sit to stand, walking, seated march, LAQ and ankle pump, seated trunk rotation and SB B, standing trunk flexion stretch, standing hip abd, seated or standing shoulder abd   Plan for next session Reassess response to headache and dizziness with MD appt soon, seated and standing exercises, hip abd, walking practice, trunk stretches, seated exercises, playing  pool, walking ability and sit to stand - progress with aerobic and strength conditioning as able   Comments   Comments At today's visit pt declined standing exercises and walking due to mild dizziness remaining from headache but was able to perform well with seated ROM and strengthening exercises without increase in body pain or SOB. At last visit pt joya'ed continued improvement with physical conditioning with decreased SOB and appropriate HR rise and fall with walking during 2 minute walk test without increase in back pain.. Pt yolas continued below age/gender norms aerobic endurance with 2 minute walk, risk of falls, trunk/core and hip weakness and remains good candidate for skilled PT services to improve impairments and reach goals.   Total Session Time   Timed Code Treatment Minutes 38   Total Treatment Time (sum of timed and untimed services) 38   AMBULATORY CLINICS ONLY-MEDICAL AND TREATMENT DIAGNOSIS   Medical Diagnosis COPD with dyspnea   PT Diagnosis Physical deconditioning with dyspnea

## 2022-07-13 ENCOUNTER — ANTICOAGULATION THERAPY VISIT (OUTPATIENT)
Dept: ANTICOAGULATION | Facility: CLINIC | Age: 62
End: 2022-07-13

## 2022-07-13 ENCOUNTER — LAB (OUTPATIENT)
Dept: LAB | Facility: CLINIC | Age: 62
End: 2022-07-13
Payer: MEDICARE

## 2022-07-13 DIAGNOSIS — I26.99 PULMONARY EMBOLISM WITH INFARCTION (H): ICD-10-CM

## 2022-07-13 DIAGNOSIS — R05.9 COUGH: ICD-10-CM

## 2022-07-13 DIAGNOSIS — Z86.711 HISTORY OF PULMONARY EMBOLISM: Primary | ICD-10-CM

## 2022-07-13 LAB — INR BLD: 2.2 (ref 0.9–1.1)

## 2022-07-13 PROCEDURE — 85610 PROTHROMBIN TIME: CPT

## 2022-07-13 PROCEDURE — 36416 COLLJ CAPILLARY BLOOD SPEC: CPT

## 2022-07-13 PROCEDURE — U0005 INFEC AGEN DETEC AMPLI PROBE: HCPCS

## 2022-07-13 PROCEDURE — U0003 INFECTIOUS AGENT DETECTION BY NUCLEIC ACID (DNA OR RNA); SEVERE ACUTE RESPIRATORY SYNDROME CORONAVIRUS 2 (SARS-COV-2) (CORONAVIRUS DISEASE [COVID-19]), AMPLIFIED PROBE TECHNIQUE, MAKING USE OF HIGH THROUGHPUT TECHNOLOGIES AS DESCRIBED BY CMS-2020-01-R: HCPCS

## 2022-07-13 NOTE — PROGRESS NOTES
ANTICOAGULATION MANAGEMENT     Valentina JOHNSON Olmedo 62 year old female is on warfarin with therapeutic INR result. (Goal INR 2.0-3.0)    Recent labs: (last 7 days)     07/13/22  1334   INR 2.2*       ASSESSMENT       Source(s): Chart Review, Patient/Caregiver Call and Template       Warfarin doses taken: Warfarin taken as instructed    Diet: No new diet changes identified    New illness, injury, or hospitalization: No    Medication/supplement changes:  Yes.    Started on Terbinfine on 7/7 till 8/18/22 for 6 wks, d/t toenail issues.   Terbinafine and warfarin is known to increase risk for bleeding.     Signs or symptoms of bleeding or clotting: No    Previous INR: Therapeutic last 5 visits    Additional findings: None       PLAN     Recommended plan for ongoing change(s) affecting INR     Dosing Instructions:    continue your current warfarin dose with next INR in 1-2 weeks       Summary  As of 7/13/2022    Full warfarin instructions:  5 mg every Mon; 10 mg all other days   Next INR check:  7/27/2022             Telephone call with  Valentina ([phone]) who verbalizes understanding and agrees to plan    Lab visit scheduled - INR on 7/26/22 @ [facility].    Education provided: Importance of consistent vitamin K intake, Goal range and significance of current result, Potential interaction between warfarin and Terbinafine and Monitoring for bleeding signs and symptoms    Plan made per [facility] anticoagulation protocol    Shonna Ernandez RN  Anticoagulation Clinic  7/13/2022    _______________________________________________________________________     Anticoagulation Episode Summary     Current INR goal:  2.0-3.0   TTR:  47.1 % (1 y)   Target end date:  Indefinite   Send INR reminders to:  [facility]    Indications    History of pulmonary embolism [Z86.711]  Pulmonary embolism with infarction (H) [I26.99]           Comments:           Anticoagulation Care Providers     Provider Role Specialty Phone number    Promise Vanegas  MD [place] Referring Family Medicine [phone]    Donald Rooney MD Referring Internal Medicine [phone]

## 2022-07-14 LAB — SARS-COV-2 RNA RESP QL NAA+PROBE: NEGATIVE

## 2022-07-18 ENCOUNTER — OFFICE VISIT (OUTPATIENT)
Dept: FAMILY MEDICINE | Facility: CLINIC | Age: 62
End: 2022-07-18
Payer: MEDICARE

## 2022-07-18 VITALS
DIASTOLIC BLOOD PRESSURE: 84 MMHG | BODY MASS INDEX: 45.99 KG/M2 | RESPIRATION RATE: 16 BRPM | WEIGHT: 293 LBS | HEART RATE: 100 BPM | TEMPERATURE: 98.9 F | SYSTOLIC BLOOD PRESSURE: 132 MMHG | OXYGEN SATURATION: 94 % | HEIGHT: 67 IN

## 2022-07-18 DIAGNOSIS — N32.81 OAB (OVERACTIVE BLADDER): ICD-10-CM

## 2022-07-18 DIAGNOSIS — R30.0 DYSURIA: Primary | ICD-10-CM

## 2022-07-18 DIAGNOSIS — G43.109 MIGRAINE WITH AURA AND WITHOUT STATUS MIGRAINOSUS, NOT INTRACTABLE: ICD-10-CM

## 2022-07-18 DIAGNOSIS — C73 MALIGNANT NEOPLASM OF THYROID GLAND (H): ICD-10-CM

## 2022-07-18 DIAGNOSIS — I10 ESSENTIAL HYPERTENSION: ICD-10-CM

## 2022-07-18 DIAGNOSIS — E03.9 ACQUIRED HYPOTHYROIDISM: ICD-10-CM

## 2022-07-18 DIAGNOSIS — N30.01 ACUTE CYSTITIS WITH HEMATURIA: ICD-10-CM

## 2022-07-18 DIAGNOSIS — G62.0 POLYNEUROPATHY DUE TO DRUG (H): ICD-10-CM

## 2022-07-18 LAB
ALBUMIN UR-MCNC: 100 MG/DL
APPEARANCE UR: ABNORMAL
BACTERIA #/AREA URNS HPF: ABNORMAL /HPF
BILIRUB UR QL STRIP: ABNORMAL
COLOR UR AUTO: YELLOW
GLUCOSE UR STRIP-MCNC: NEGATIVE MG/DL
HGB UR QL STRIP: ABNORMAL
KETONES UR STRIP-MCNC: ABNORMAL MG/DL
LEUKOCYTE ESTERASE UR QL STRIP: ABNORMAL
NITRATE UR QL: NEGATIVE
PH UR STRIP: 5.5 [PH] (ref 5–8)
RBC #/AREA URNS AUTO: ABNORMAL /HPF
SP GR UR STRIP: 1.02 (ref 1–1.03)
SQUAMOUS #/AREA URNS AUTO: ABNORMAL /LPF
UROBILINOGEN UR STRIP-ACNC: 0.2 E.U./DL
WBC #/AREA URNS AUTO: ABNORMAL /HPF
WBC CLUMPS #/AREA URNS HPF: PRESENT /HPF

## 2022-07-18 PROCEDURE — 99214 OFFICE O/P EST MOD 30 MIN: CPT | Performed by: FAMILY MEDICINE

## 2022-07-18 PROCEDURE — 36415 COLL VENOUS BLD VENIPUNCTURE: CPT | Performed by: FAMILY MEDICINE

## 2022-07-18 PROCEDURE — 81001 URINALYSIS AUTO W/SCOPE: CPT | Performed by: FAMILY MEDICINE

## 2022-07-18 PROCEDURE — 80053 COMPREHEN METABOLIC PANEL: CPT | Performed by: FAMILY MEDICINE

## 2022-07-18 PROCEDURE — 87086 URINE CULTURE/COLONY COUNT: CPT | Performed by: FAMILY MEDICINE

## 2022-07-18 PROCEDURE — 84443 ASSAY THYROID STIM HORMONE: CPT | Performed by: FAMILY MEDICINE

## 2022-07-18 RX ORDER — NITROFURANTOIN 25; 75 MG/1; MG/1
100 CAPSULE ORAL 2 TIMES DAILY
Qty: 10 CAPSULE | Refills: 0 | Status: SHIPPED | OUTPATIENT
Start: 2022-07-18 | End: 2022-11-15

## 2022-07-18 RX ORDER — SUMATRIPTAN 50 MG/1
50 TABLET, FILM COATED ORAL
Qty: 9 TABLET | Refills: 1 | Status: SHIPPED | OUTPATIENT
Start: 2022-07-18 | End: 2024-03-27

## 2022-07-18 RX ORDER — OXYBUTYNIN CHLORIDE 10 MG/1
10 TABLET, EXTENDED RELEASE ORAL DAILY
Qty: 90 TABLET | Refills: 3 | Status: SHIPPED | OUTPATIENT
Start: 2022-07-18 | End: 2024-03-27

## 2022-07-18 ASSESSMENT — PATIENT HEALTH QUESTIONNAIRE - PHQ9
SUM OF ALL RESPONSES TO PHQ QUESTIONS 1-9: 8
SUM OF ALL RESPONSES TO PHQ QUESTIONS 1-9: 8
10. IF YOU CHECKED OFF ANY PROBLEMS, HOW DIFFICULT HAVE THESE PROBLEMS MADE IT FOR YOU TO DO YOUR WORK, TAKE CARE OF THINGS AT HOME, OR GET ALONG WITH OTHER PEOPLE: VERY DIFFICULT

## 2022-07-18 ASSESSMENT — ENCOUNTER SYMPTOMS
HEADACHES: 1
DYSURIA: 1

## 2022-07-18 NOTE — ASSESSMENT & PLAN NOTE
History of migrainous headaches.  Patient is on a number of medications that can potentially contribute to headaches.  No contraindication to abortive therapy.  Tylenol for current episode.  Less frequent headaches in recent years, perhaps as a result of use of topiramate.  Close follow-up.  Laboratory testing today.  Consider discontinuance of terbinafine if continue to be problematic.

## 2022-07-18 NOTE — ASSESSMENT & PLAN NOTE
Symptoms consistent with bladder infection.  Most recent episode was in April which was treated with nitrofurantoin.  Mixed urogenital anastasiya.  Patient states that she improved quite quick with treatment.  We will treat with nitrofurantoin.  Culture pending.

## 2022-07-18 NOTE — PROGRESS NOTES
Problem List Items Addressed This Visit     Acute cystitis with hematuria     Symptoms consistent with bladder infection.  Most recent episode was in April which was treated with nitrofurantoin.  Mixed urogenital anastasiya.  Patient states that she improved quite quick with treatment.  We will treat with nitrofurantoin.  Culture pending.           Essential hypertension     Check potassium.  No lightheadedness.  No dizziness.           Relevant Orders    Comprehensive metabolic panel (BMP + Alb, Alk Phos, ALT, AST, Total. 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  Patient is on a number of medications that can potentially contribute to headaches.  No contraindication to abortive therapy.  Tylenol for current episode.  Less frequent headaches in recent years, perhaps as a result of use of topiramate.  Close follow-up.  Laboratory testing today.  Consider discontinuance of terbinafine if continue to be problematic.           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              Other Visit Diagnoses     Dysuria    -  Primary    Relevant Medications    nitroFURantoin macrocrystal-monohydrate (MACROBID) 100 MG capsule    Other Relevant Orders    UA Macro with Reflex to Micro and Culture - lab collect (Completed)    Urine Microscopic (Completed)    Urine Culture    OAB (overactive bladder)        Relevant Medications    oxybutynin ER (DITROPAN XL) 10 MG 24 hr tablet          Subjective   Valentina is a 62 year old who presents for the following health issues  accompanied by her spouse  Chief Complaint   Patient presents with     Dysuria     And dark color. Urgency.      Headache      Migraine headache:   - none for quite some time.  Associated nausea. Nausea is normal for migraines.  Aura as well.   -  "headache has persisted.  Migraine resolved   - ongoing \"stress headache.\"  Pain is located on right side and on front.   - symptoms have stabilized.   - persistent pain is unusual.    - sinus?  Not particularily painful.   - \"Its hard to eat.\" One monster energy drink per day.  Breakfast is meal replacement.    Dysuria:   - she reports that she is having problems. Now with sense of urgency.   - she is not sure if she has been taking oxybuytnin   - trying to drink lots of water.  Throughout the day, her urinary volume is low   - she improved with antibiotic therapy    - the main difference today is that she does not experience burning sensation.    - she started terbinafine for headache recently.        Dysuria  Associated symptoms include headaches.   Headache     History of Present Illness       Headaches:   Since the patient's last clinic visit, headaches are: worsened  The patient is getting headaches:  Almost every day  She is not able to do normal daily activities when she has a migraine.  The patient is taking the following rescue/relief medications:  Tylenol   Patient states \"I get no relief\" from the rescue/relief medications.   The patient is taking the following medications to prevent migraines:  No medications to prevent migraines  In the past 4 weeks, the patient has gone to an Urgent Care or Emergency Room 0 times times due to headaches.    She eats 2-3 servings of fruits and vegetables daily.She consumes 1 sweetened beverage(s) daily.She exercises with enough effort to increase her heart rate 9 or less minutes per day.  She exercises with enough effort to increase her heart rate 7 days per week.   She is taking medications regularly.    Today's PHQ-9         PHQ-9 Total Score: 8    PHQ-9 Q9 Thoughts of better off dead/self-harm past 2 weeks :   Several days  Thoughts of suicide or self harm: (P) No  Self-harm Plan:     Self-harm Action:       Safety concerns for self or others: (P) Yes    How difficult " "have these problems made it for you to do your work, take care of things at home, or get along with other people: Very difficult    Review of Systems   Genitourinary: Positive for dysuria.   Neurological: Positive for headaches.            Objective    /84 (BP Location: Left arm, Patient Position: Sitting, Cuff Size: Adult Large)   Pulse 100   Temp 98.9  F (37.2  C) (Oral)   Resp 16   Ht 1.689 m (5' 6.5\")   Wt (!) 152.7 kg (336 lb 9 oz)   LMP  (LMP Unknown)   SpO2 94%   BMI 53.51 kg/m    Body mass index is 53.51 kg/m .  Physical Exam  Nursing note reviewed.   Constitutional:       General: She is not in acute distress.     Appearance: Normal appearance. She is not ill-appearing.   HENT:      Head: Normocephalic and atraumatic.   Eyes:      Extraocular Movements: Extraocular movements intact.      Conjunctiva/sclera: Conjunctivae normal.   Pulmonary:      Effort: Pulmonary effort is normal.   Neurological:      Mental Status: She is alert and oriented to person, place, and time.   Psychiatric:         Attention and Perception: Attention normal.         Mood and Affect: Mood normal.         Speech: Speech normal.         Thought Content: Thought content normal.              This note has been dictated using voice recognition software. Any grammatical or context distortions are unintentional and inherent to the software      "

## 2022-07-19 ENCOUNTER — TELEPHONE (OUTPATIENT)
Dept: FAMILY MEDICINE | Facility: CLINIC | Age: 62
End: 2022-07-19

## 2022-07-19 LAB
ALBUMIN SERPL BCG-MCNC: 4 G/DL (ref 3.5–5.2)
ALP SERPL-CCNC: 78 U/L (ref 35–104)
ALT SERPL W P-5'-P-CCNC: 24 U/L (ref 10–35)
ANION GAP SERPL CALCULATED.3IONS-SCNC: 12 MMOL/L (ref 7–15)
AST SERPL W P-5'-P-CCNC: 22 U/L (ref 10–35)
BILIRUB SERPL-MCNC: 0.2 MG/DL
BUN SERPL-MCNC: 12.1 MG/DL (ref 8–23)
CALCIUM SERPL-MCNC: 9.1 MG/DL (ref 8.8–10.2)
CHLORIDE SERPL-SCNC: 100 MMOL/L (ref 98–107)
CREAT SERPL-MCNC: 0.9 MG/DL (ref 0.51–0.95)
DEPRECATED HCO3 PLAS-SCNC: 27 MMOL/L (ref 22–29)
GFR SERPL CREATININE-BSD FRML MDRD: 72 ML/MIN/1.73M2
GLUCOSE SERPL-MCNC: 125 MG/DL (ref 70–99)
POTASSIUM SERPL-SCNC: 4.2 MMOL/L (ref 3.4–5.3)
PROT SERPL-MCNC: 6.9 G/DL (ref 6.4–8.3)
SODIUM SERPL-SCNC: 139 MMOL/L (ref 136–145)
TSH SERPL DL<=0.005 MIU/L-ACNC: 1.15 UIU/ML (ref 0.3–4.2)

## 2022-07-19 NOTE — TELEPHONE ENCOUNTER
Called and talked with Valentina,     - reported OV with Dr. Starr on 7/18/22 for symptoms of dysuria.     - UA showed UTI.     - UC - still pending.     - prescribed Nitrofurantoin 100mg BID for 5 days    - Also Imitrex and Oxybutnin.     - (await UC if any changes of treatment)        - (all  Medications have no known interaction with warfarin).      - also reported rash on arm which has had for a while.  Not itchy or spreading.     - patient would feel more comfortable to check INR sooner, and INR rescheduled this Fri. 7/22/22 @ [facility].

## 2022-07-19 NOTE — TELEPHONE ENCOUNTER
Reason for Call:  Other : INR/ medications     Detailed comments: started new medication and would like to speak to a INR nurse to find out if she needs to come in sooner for a INR.    Phone Number Patient can be reached at: Cell number on file:    Telephone Information:   Mobile [phone]       Best Time: any     Can we leave a detailed message on this number? YES    Call taken on 7/19/2022 at 1:21 PM by Chen Beasley

## 2022-07-20 LAB — BACTERIA UR CULT: NORMAL

## 2022-07-21 ENCOUNTER — TELEPHONE (OUTPATIENT)
Dept: PSYCHIATRY | Facility: CLINIC | Age: 62
End: 2022-07-21

## 2022-07-22 ENCOUNTER — ANTICOAGULATION THERAPY VISIT (OUTPATIENT)
Dept: ANTICOAGULATION | Facility: CLINIC | Age: 62
End: 2022-07-22

## 2022-07-22 ENCOUNTER — LAB (OUTPATIENT)
Dept: LAB | Facility: CLINIC | Age: 62
End: 2022-07-22
Payer: MEDICARE

## 2022-07-22 DIAGNOSIS — I26.99 PULMONARY EMBOLISM WITH INFARCTION (H): ICD-10-CM

## 2022-07-22 DIAGNOSIS — Z86.711 HISTORY OF PULMONARY EMBOLISM: Primary | ICD-10-CM

## 2022-07-22 LAB — INR BLD: 3.5 (ref 0.9–1.1)

## 2022-07-22 PROCEDURE — 36416 COLLJ CAPILLARY BLOOD SPEC: CPT

## 2022-07-22 PROCEDURE — 85610 PROTHROMBIN TIME: CPT

## 2022-07-22 NOTE — PROGRESS NOTES
ANTICOAGULATION MANAGEMENT     Valentina JOHNSON Olmedo 62 year old female is on warfarin with supratherapeutic INR result. (Goal INR 2.0-3.0)    Recent labs: (last 7 days)     07/22/22  1312   INR 3.5*       ASSESSMENT       Source(s): Chart Review, Patient/Caregiver Call and Template       Warfarin doses taken: Warfarin taken as instructed    Diet: No new diet changes identified    New illness, injury, or hospitalization: Yes:    Dysuria / UTI    Medication/supplement changes:  Yes.    On Nitrofurantoin 100mg BID for 5 days from 7/18 -23   And also taking gen. Imitrex for migraine headaches.  She is also on a 6wk therapy with Terbinafine for her toenails from 7/7-8/12.  (this has known interaction to increase INR and risk for bleeding.)    Signs or symptoms of bleeding or clotting: No    Previous INR: Therapeutic last 6 visits    Additional findings: None       PLAN     Recommended plan for temporary change(s) and ongoing change(s) affecting INR     Dosing Instructions:    hold warfarin dose tonight,    then decrease your warfarin dose (7.7% change)    next INR in 1-2 weeks       Summary  As of 7/22/2022    Full warfarin instructions:  7/22: Hold; Otherwise 5 mg every Mon, Thu; 10 mg all other days   Next INR check:  8/5/2022             Telephone call with  Valentina ([phone]) who verbalizes understanding and agrees to plan    Lab visit scheduled - INR on 8/2/22 2 [facility].    Education provided: Importance of consistent vitamin K intake, Goal range and significance of current result, Potential interaction between warfarin and Terbinafine, No interaction anticipated between warfarin and Nitrofurantoin and Imitrex, Monitoring for bleeding signs and symptoms and When to seek medical attention/emergency care    Plan made per [facility] anticoagulation protocol    Shonna Ernandez, RN  Anticoagulation Clinic  7/22/2022    _______________________________________________________________________     Anticoagulation Episode Summary      Current INR goal:  2.0-3.0   TTR:  46.2 % (1 y)   Target end date:  Indefinite   Send INR reminders to:  [facility]    Indications    History of pulmonary embolism [Z86.711]  Pulmonary embolism with infarction (H) [I26.99]           Comments:           Anticoagulation Care Providers     Provider Role Specialty Phone number    Promise Vanegas MD Referring Family Medicine [phone]    Donald Rooney MD Referring Internal Medicine [phone]

## 2022-07-25 ENCOUNTER — MEDICAL CORRESPONDENCE (OUTPATIENT)
Dept: HEALTH INFORMATION MANAGEMENT | Facility: CLINIC | Age: 62
End: 2022-07-25

## 2022-07-25 NOTE — PROGRESS NOTES
Spoke with RIDGE Hazel and relayed the dosing as outlined below. In the future, Yasemin would like a call with dosing as well.

## 2022-07-29 NOTE — PROGRESS NOTES
Hamida RN from [facility] ([phone]) called and reports that she would like each [facility] episode/orders faxed to them at ([phone]) in addition to giving dosing to patient Valentina and calling RIDGE Hazel at ([phone]).    Yasemin sets up pt medications every Monday.    Pt goes to clinic for INR's

## 2022-08-01 DIAGNOSIS — Z79.01 LONG TERM (CURRENT) USE OF ANTICOAGULANTS: ICD-10-CM

## 2022-08-01 DIAGNOSIS — Z79.899 LONG TERM USE OF DRUG: ICD-10-CM

## 2022-08-01 DIAGNOSIS — G40.209 LOCALIZATION-RELATED FOCAL EPILEPSY WITH COMPLEX PARTIAL SEIZURES (H): Primary | ICD-10-CM

## 2022-08-02 ENCOUNTER — OFFICE VISIT (OUTPATIENT)
Dept: PSYCHIATRY | Facility: CLINIC | Age: 62
End: 2022-08-02
Attending: PSYCHIATRY & NEUROLOGY
Payer: MEDICARE

## 2022-08-02 ENCOUNTER — MEDICAL CORRESPONDENCE (OUTPATIENT)
Dept: HEALTH INFORMATION MANAGEMENT | Facility: CLINIC | Age: 62
End: 2022-08-02

## 2022-08-02 ENCOUNTER — LAB (OUTPATIENT)
Dept: LAB | Facility: CLINIC | Age: 62
End: 2022-08-02
Payer: MEDICARE

## 2022-08-02 ENCOUNTER — ANTICOAGULATION THERAPY VISIT (OUTPATIENT)
Dept: ANTICOAGULATION | Facility: CLINIC | Age: 62
End: 2022-08-02

## 2022-08-02 VITALS
DIASTOLIC BLOOD PRESSURE: 72 MMHG | WEIGHT: 293 LBS | SYSTOLIC BLOOD PRESSURE: 111 MMHG | HEART RATE: 85 BPM | BODY MASS INDEX: 53.58 KG/M2

## 2022-08-02 DIAGNOSIS — F25.1 SCHIZOAFFECTIVE DISORDER, DEPRESSIVE TYPE (H): ICD-10-CM

## 2022-08-02 DIAGNOSIS — I26.99 PULMONARY EMBOLISM WITH INFARCTION (H): ICD-10-CM

## 2022-08-02 DIAGNOSIS — Z86.711 HISTORY OF PULMONARY EMBOLISM: Primary | ICD-10-CM

## 2022-08-02 DIAGNOSIS — F41.9 ANXIETY: ICD-10-CM

## 2022-08-02 LAB — INR BLD: 1.7 (ref 0.9–1.1)

## 2022-08-02 PROCEDURE — 99215 OFFICE O/P EST HI 40 MIN: CPT | Performed by: PSYCHIATRY & NEUROLOGY

## 2022-08-02 PROCEDURE — 36416 COLLJ CAPILLARY BLOOD SPEC: CPT

## 2022-08-02 PROCEDURE — 85610 PROTHROMBIN TIME: CPT

## 2022-08-02 RX ORDER — CARBAMAZEPINE 200 MG/1
TABLET ORAL
COMMUNITY

## 2022-08-02 RX ORDER — HYDROXYZINE HYDROCHLORIDE 10 MG/1
10 TABLET, FILM COATED ORAL 2 TIMES DAILY PRN
Qty: 60 TABLET | Refills: 6 | Status: SHIPPED | OUTPATIENT
Start: 2022-08-02 | End: 2022-11-01

## 2022-08-02 NOTE — PROGRESS NOTES
Patient in clinic for medication management    Discus needed? Yes-score 0    UA Needed? No  [place]  to be reviewed by provider.   Medication refill is pended for review and approval by provider.    RUY-7 scores:    RUY-7 SCORE 8/29/2021 3/16/2022   Total Score - 7 (mild anxiety)   Total Score 11 7       PHQ-9 scores:   PHQ-9 SCORE 5/27/2022 7/18/2022   PHQ-9 Total Score MyChart 7 (Mild depression) 8 (Mild depression)   PHQ-9 Total Score 7 8       Regla Storey CMA on 8/2/2022 at 11:09 AM

## 2022-08-02 NOTE — PROGRESS NOTES
ANTICOAGULATION MANAGEMENT     Valentina Olmedo 62 year old female is on warfarin with subtherapeutic INR result. (Goal INR 2.0-3.0)    Recent labs: (last 7 days)     08/02/22  1231   INR 1.7*       ASSESSMENT       Source(s): Chart Review, Patient/Caregiver Call and Template       Warfarin doses taken: Warfarin taken as instructed    Diet: No new diet changes identified    Drinking her protein shakes one can per day, however, she reported drinking 2 cans one wk ago.    New illness, injury, or hospitalization: Yes:    Continues to reported 3 &1/2 days of headaches (migraine) everyday.  Not able to do anything but stay in bed.  She wants to do things.   Migraine is accompanied by dizziness and (aura) vision issues but relates it to cataracts.    Medication/supplement changes:  Yes.    Continues with Terbinafine for 6 wks, from 7/7 - 8/18/22 for fungus (toenails )  Continues with Imitrex 50mg, however, she was only taking one tab daily, which somewhat helped.  RX bottle instruction is to take 50mg at the onset of migraine, may repeat in 2 hrs.  Max tabs is 4 tablet in a 24hr period.  (she will follow directions as instructed).  Continues with Tegretol 400mg TID,    Signs or symptoms of bleeding or clotting: No    Previous INR: Supratherapeutic at 3.5 on 7/22/22.    Additional findings: Yes.    - Called RN Case Manager Hamida Montenegro RN with [employer]  ([phone])   - Called Yasemin WHITAKER LPN with warfarin dosing; (she sets up meds for Valentina. ([phone])  - Called Valentina also with dosing instructions.  - then FAXED telephone orders to [employer] - ([phone])       PLAN     Recommended plan for temporary change(s) and ongoing change(s) affecting INR     Dosing Instructions:   (evenings. 5mg tabs)    booster dose then continue your current warfarin dose with next INR in 1-2 weeks       Summary  As of 8/2/2022    Full warfarin instructions:  8/2: 15 mg; Otherwise 5 mg every Mon, Thu; 10 mg all other days   Next INR  check:  8/16/2022             Telephone call with  Valentina (René's cell phone [phone]) who verbalizes understanding and agrees to plan    Lab visit scheduled - INR on 8/18/22 @ [facility]   - during PT     Education provided: Importance of consistent vitamin K intake, Impact of vitamin K foods on INR, Vitamin K content of foods, Goal range and significance of current result, Monitoring for clotting signs and symptoms and Importance of notifying clinic for diarrhea, nausea/vomiting, reduced intake, and/or illness; a sooner lab recheck maybe needed.    Plan made per [facility] anticoagulation protocol    Shonna Ernandez, RN  Anticoagulation Clinic  8/2/2022    _______________________________________________________________________     Anticoagulation Episode Summary     Current INR goal:  2.0-3.0   TTR:  45.0 % (1 y)   Target end date:  Indefinite   Send INR reminders to:  [facility]    Indications    History of pulmonary embolism [Z86.711]  Pulmonary embolism with infarction (H) [I26.99]           Comments:           Anticoagulation Care Providers     Provider Role Specialty Phone number    Promise Vanegas MD Referring Family Medicine [phone]    Donald Rooney MD Referring Internal Medicine [phone]

## 2022-08-02 NOTE — PROGRESS NOTES
"    Medical Decision Makin. Schizoaffective disorder, depressive type (H)  Reports mood stable, denies depression.   Plan:   Continue Seroquel 200 mg nightly, Cymbalta 120 mg daily.      2. PTSD (post-traumatic stress disorder)   C/o increased anxiety r/t  René irritability.  Plan:   Prazosin 1mg q hs,   melatonin 10 mg q hs.  Hydroxyzine 10 mg twice daily as needed anxiety  Resume therapy with [facility].  Pt to get CD player & listen to music.      3. CHRIS (obstructive sleep apnea)  Working on trying new appliance. Feels better with CPAP.     4. High risk medication use  DISCUS score = 0 (done 10/05/21).  Increase Topamax 50 mg nightly to target appetite and weight.      5. Epilepsy  Managed by Vikki Benitez MD, [place] [facility].      Return in about 3 mos.-in-person appointment.         History of Present Illness:   Valentina Olmedo is a 62 y.o. female here for SCAD, bipolar type, PTSD, Neurocognitive disorder, seizure disorder here for f/u.    Pt reports increased anxiety.      In-home services:  -P[facility]  -Med set up q Monday  -[facility], [facility]    Weekly INR.      SOC: Lives with  René.    Chronic, René. Cat, \"Yosi\".       ROS:  Denies SI/manic/sx.  See HPI, otherwise negative.      Current Outpatient Medications:      albuterol (PROAIR HFA) 108 (90 Base) MCG/ACT inhaler, Inhale 2 puffs into the lungs every 4 hours as needed for shortness of breath / dyspnea or wheezing, Disp: 8 g, Rfl: 4     Brivaracetam (BRIVIACT) 100 MG tablet, Take 150 mg by mouth 2 times daily , Disp: , Rfl:      Calcium Citrate-Vitamin D (CALCIUM + D PO), Take 1 tablet by mouth daily , Disp: , Rfl:      carBAMazepine (TEGRETOL) 200 MG tablet, Take 400 mg by mouth 3 times daily, Disp: , Rfl:      Cyanocobalamin (VITAMIN B12) 1000 MCG TBCR, Take 1 tablet by mouth, Disp: , Rfl:      DULoxetine (CYMBALTA) 60 MG capsule, Take 2 capsules (120 mg) by mouth daily, Disp: 180 capsule, Rfl: 3     EPINEPHrine " (ADRENACLICK JR) 0.15 MG/0.15ML injection 2-pack, Inject 0.15 mLs (0.15 mg) into the muscle as needed for anaphylaxis, Disp: 2 each, Rfl: 1     EPINEPHrine (ANY BX GENERIC EQUIV) 0.3 MG/0.3ML injection 2-pack, Inject 0.3 mLs (0.3 mg) into the muscle as needed for anaphylaxis, Disp: 2 each, Rfl: 1     fexofenadine (ALLEGRA) 180 MG tablet, Take 1 tablet (180 mg) by mouth daily, Disp: 30 tablet, Rfl: 1     fluticasone (FLONASE) 50 MCG/ACT nasal spray, INHALE 1 SPRAY IN EACH NOSTRIL DAILY, Disp: 16 g, Rfl: 4     hydrochlorothiazide (HYDRODIURIL) 25 MG tablet, Take 1 tablet (25 mg) by mouth daily, Disp: 90 tablet, Rfl: 3     hydrOXYzine (ATARAX) 10 MG tablet, Take 1 tablet (10 mg) by mouth 2 times daily as needed for anxiety, Disp: 60 tablet, Rfl: 6     levothyroxine (SYNTHROID/LEVOTHROID) 300 MCG tablet, Take 1 tablet (300 mcg) by mouth daily, Disp: 90 tablet, Rfl: 3     melatonin 10 MG TABS tablet, Take 1 tablet (10 mg) by mouth nightly as needed for sleep, Disp: 30 tablet, Rfl: 2     multivitamin with iron (CHROMAGEN) TABS half-tab, , Disp: , Rfl:      nitroFURantoin macrocrystal-monohydrate (MACROBID) 100 MG capsule, Take 1 capsule (100 mg) by mouth 2 times daily, Disp: 10 capsule, Rfl: 0     Nutritional Supplements (NUTRITIONAL SHAKE HIGH PROTEIN) LIQD, Take 1 each by mouth every morning Dispense Premier protein shakes. Replace breakfast with 1 shake daily., Disp: 9900 mL, Rfl: 11     olopatadine (PATADAY) 0.2 % ophthalmic solution, Place 1 drop into both eyes daily, Disp: 2.5 mL, Rfl: 5     omeprazole (PRILOSEC) 20 MG DR capsule, Take 1 capsule (20 mg) by mouth 2 times daily, Disp: 180 capsule, Rfl: 3     oxybutynin ER (DITROPAN XL) 10 MG 24 hr tablet, Take 1 tablet (10 mg) by mouth daily, Disp: 90 tablet, Rfl: 3     Polyethylene Glycol POWD, See Admin Instructions, Disp: , Rfl:      prazosin (MINIPRESS) 1 MG capsule, Take 1 capsule (1 mg) by mouth At Bedtime, Disp: 30 capsule, Rfl: 6     QUEtiapine (SEROQUEL) 200  MG tablet, Take 1 tablet (200 mg) by mouth At Bedtime, Disp: 90 tablet, Rfl: 3     rosuvastatin (CRESTOR) 20 MG tablet, TAKE 1 TABLET BY MOUTH DAILY, Disp: 90 tablet, Rfl: 3     salmeterol (SEREVENT) 50 MCG/DOSE inhaler, Inhale 1 puff into the lungs 2 times daily, Disp: 180 each, Rfl: 4     SENNA-TIME 8.6 MG tablet, TAKE 2 TABLETS (17.2MG) BY MOUTH TWICE A DAY, Disp: 360 tablet, Rfl: 3     SUMAtriptan (IMITREX) 50 MG tablet, Take 1 tablet (50 mg) by mouth at onset of headache for migraine May repeat in 2 hours. Max 4 tablets/24 hours., Disp: 9 tablet, Rfl: 1     terbinafine (LAMISIL) 250 MG tablet, , Disp: , Rfl:      tiZANidine (ZANAFLEX) 4 MG tablet, Take 1 tablet (4 mg) by mouth 3 times daily, Disp: 42 tablet, Rfl: 3     topiramate (TOPAMAX) 50 MG tablet, Take 1 tablet (50 mg) by mouth At Bedtime, Disp: 90 tablet, Rfl: 1     Vitamin D-Vitamin K (VITAMIN K2-VITAMIN D3)  MCG-UNIT CAPS, 2 capsules, Disp: , Rfl:      vitamin D3 (CHOLECALCIFEROL) 50 mcg (2000 units) tablet, Take 2 tablets (100 mcg) by mouth daily, Disp: 180 tablet, Rfl: 3     warfarin ANTICOAGULANT (COUMADIN) 5 MG tablet, New dose - take 2 tablets (10mg) to 3 tablets (15mg) by mouth daily, as directed.  Adjust dose based on INR results., Disp: 210 tablet, Rfl: 1       /72   Pulse 85   Wt (!) 152.9 kg (337 lb)   LMP  (LMP Unknown)   BMI 53.58 kg/m       MSE:      Alert & oriented x 3.   Appearance: Neat.  Speech: Normal rate, rhythm and tone.  Gait: Not observed, in w/c  Musculoskeletal: No abnormal movements-had pt remove mask and observed tongue.  No fasciculations or movement.  Mood/Affect: Full range  Thought Process: Normal rate, perseverative  Thought Content: No suicide or homicide ideation.  Associations: Intact, no delusions.  Perceptions: No hallucinations.  Memory: recent and remote memory intact, not formally tested  Attention span and concentration: normal, not formally tested  Language: Intact.  Fund of Knowledge:  Normal.  Insight and Judgement: Adequate.         Total time spent on this encounter, on the date of service, including pre-visit review of separately obtained history, face-to-face interaction performing medically appropriate physical exam, patient counseling/education, interpretation of diagnostic results, care coordination and documentation was 50 minutes.        Breana Puente MD

## 2022-08-11 ENCOUNTER — TELEPHONE (OUTPATIENT)
Dept: ANTICOAGULATION | Facility: CLINIC | Age: 62
End: 2022-08-11

## 2022-08-11 DIAGNOSIS — Z86.711 HISTORY OF PULMONARY EMBOLISM: Primary | ICD-10-CM

## 2022-08-11 DIAGNOSIS — I26.99 PULMONARY EMBOLISM WITH INFARCTION (H): ICD-10-CM

## 2022-08-11 NOTE — TELEPHONE ENCOUNTER
Received VM on [facility] line requesting a callback to discuss INR next week.    Hamida calling, please call back at [phone].    Thank you,   Kyleigh Jimenes RN

## 2022-08-11 NOTE — TELEPHONE ENCOUNTER
Talked with nurse who said for inr next week 8/18, result/dosing should be called to nurse Dionisoi . This is just for next week as the other nurses are on pto  Marked in calendar   none

## 2022-08-12 ENCOUNTER — TELEPHONE (OUTPATIENT)
Dept: FAMILY MEDICINE | Facility: CLINIC | Age: 62
End: 2022-08-12

## 2022-08-12 ENCOUNTER — VIRTUAL VISIT (OUTPATIENT)
Dept: BEHAVIORAL HEALTH | Facility: CLINIC | Age: 62
End: 2022-08-12
Payer: MEDICARE

## 2022-08-12 DIAGNOSIS — F25.1 SCHIZOAFFECTIVE DISORDER, DEPRESSIVE TYPE (H): Primary | ICD-10-CM

## 2022-08-12 DIAGNOSIS — F33.1 MAJOR DEPRESSIVE DISORDER, RECURRENT EPISODE, MODERATE (H): ICD-10-CM

## 2022-08-12 DIAGNOSIS — F43.10 PTSD (POST-TRAUMATIC STRESS DISORDER): ICD-10-CM

## 2022-08-12 PROCEDURE — 90791 PSYCH DIAGNOSTIC EVALUATION: CPT | Mod: 52

## 2022-08-12 ASSESSMENT — ANXIETY QUESTIONNAIRES
GAD7 TOTAL SCORE: 14
GAD7 TOTAL SCORE: 14
IF YOU CHECKED OFF ANY PROBLEMS ON THIS QUESTIONNAIRE, HOW DIFFICULT HAVE THESE PROBLEMS MADE IT FOR YOU TO DO YOUR WORK, TAKE CARE OF THINGS AT HOME, OR GET ALONG WITH OTHER PEOPLE: VERY DIFFICULT
3. WORRYING TOO MUCH ABOUT DIFFERENT THINGS: SEVERAL DAYS
6. BECOMING EASILY ANNOYED OR IRRITABLE: SEVERAL DAYS
7. FEELING AFRAID AS IF SOMETHING AWFUL MIGHT HAPPEN: MORE THAN HALF THE DAYS
5. BEING SO RESTLESS THAT IT IS HARD TO SIT STILL: NEARLY EVERY DAY
1. FEELING NERVOUS, ANXIOUS, OR ON EDGE: NEARLY EVERY DAY
2. NOT BEING ABLE TO STOP OR CONTROL WORRYING: SEVERAL DAYS

## 2022-08-12 ASSESSMENT — PATIENT HEALTH QUESTIONNAIRE - PHQ9
SUM OF ALL RESPONSES TO PHQ QUESTIONS 1-9: 17
5. POOR APPETITE OR OVEREATING: NEARLY EVERY DAY

## 2022-08-12 NOTE — TELEPHONE ENCOUNTER
Reason for Call:  Other appointment    Detailed comments: Patient called in with over active bladder issues. She states the medication she was taking worked and now it no longer helps. She made the first available appointment for 9-1-22 but would like to be seen sooner if possible    Phone Number Patient can be reached at: Home number on file [phone] (home)    Best Time: any    Can we leave a detailed message on this number? YES    Call taken on 8/12/2022 at 6:11 PM by Ana Pop
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
## 2022-08-14 NOTE — TELEPHONE ENCOUNTER
I am not in clinic this week.  Does the patient have dysuria symptoms?  UTI?  She was referred to gynecology this past spring to discuss these types of issues.  Did she ever have an evaluation?

## 2022-08-15 NOTE — TELEPHONE ENCOUNTER
Patient returned call. She states she saw [facility] from the referral and she will follow-up with them about current symptoms.

## 2022-08-15 NOTE — TELEPHONE ENCOUNTER
Left message to call back for: kate  Information to relay to patient: see below and as or transfer to CMT

## 2022-08-18 ENCOUNTER — ANTICOAGULATION THERAPY VISIT (OUTPATIENT)
Dept: ANTICOAGULATION | Facility: CLINIC | Age: 62
End: 2022-08-18

## 2022-08-18 ENCOUNTER — HOSPITAL ENCOUNTER (OUTPATIENT)
Dept: PHYSICAL THERAPY | Facility: REHABILITATION | Age: 62
Discharge: HOME | End: 2022-08-18

## 2022-08-18 ENCOUNTER — LAB (OUTPATIENT)
Dept: LAB | Facility: CLINIC | Age: 62
End: 2022-08-18
Payer: MEDICARE

## 2022-08-18 DIAGNOSIS — G40.209 LOCALIZATION-RELATED FOCAL EPILEPSY WITH COMPLEX PARTIAL SEIZURES (H): ICD-10-CM

## 2022-08-18 DIAGNOSIS — Z86.711 HISTORY OF PULMONARY EMBOLISM: Primary | ICD-10-CM

## 2022-08-18 DIAGNOSIS — R26.9 ABNORMALITY OF GAIT: ICD-10-CM

## 2022-08-18 DIAGNOSIS — R29.818 DIFFICULTY BALANCING: ICD-10-CM

## 2022-08-18 DIAGNOSIS — J30.2 SEASONAL ALLERGIC RHINITIS, UNSPECIFIED TRIGGER: ICD-10-CM

## 2022-08-18 DIAGNOSIS — E03.9 ACQUIRED HYPOTHYROIDISM: ICD-10-CM

## 2022-08-18 DIAGNOSIS — J44.9 CHRONIC OBSTRUCTIVE PULMONARY DISEASE, UNSPECIFIED COPD TYPE (H): Primary | ICD-10-CM

## 2022-08-18 DIAGNOSIS — R53.81 PHYSICAL DECONDITIONING: ICD-10-CM

## 2022-08-18 DIAGNOSIS — I26.99 PULMONARY EMBOLISM WITH INFARCTION (H): ICD-10-CM

## 2022-08-18 DIAGNOSIS — M62.81 GENERALIZED MUSCLE WEAKNESS: ICD-10-CM

## 2022-08-18 DIAGNOSIS — G89.29 CHRONIC BILATERAL LOW BACK PAIN WITH RIGHT-SIDED SCIATICA: ICD-10-CM

## 2022-08-18 DIAGNOSIS — M80.00XG AGE-RELATED OSTEOPOROSIS WITH CURRENT PATHOLOGICAL FRACTURE WITH DELAYED HEALING, SUBSEQUENT ENCOUNTER: ICD-10-CM

## 2022-08-18 DIAGNOSIS — Z79.899 LONG TERM USE OF DRUG: ICD-10-CM

## 2022-08-18 DIAGNOSIS — M54.41 CHRONIC BILATERAL LOW BACK PAIN WITH RIGHT-SIDED SCIATICA: ICD-10-CM

## 2022-08-18 DIAGNOSIS — R06.00 DYSPNEA, UNSPECIFIED TYPE: ICD-10-CM

## 2022-08-18 DIAGNOSIS — Z87.81 HISTORY OF VERTEBRAL COMPRESSION FRACTURE: ICD-10-CM

## 2022-08-18 LAB
ALBUMIN SERPL BCG-MCNC: 4.1 G/DL (ref 3.5–5.2)
ALP SERPL-CCNC: 77 U/L (ref 35–104)
ALT SERPL W P-5'-P-CCNC: 25 U/L (ref 10–35)
ANION GAP SERPL CALCULATED.3IONS-SCNC: 15 MMOL/L (ref 7–15)
AST SERPL W P-5'-P-CCNC: 22 U/L (ref 10–35)
BASOPHILS # BLD AUTO: 0.1 10E3/UL (ref 0–0.2)
BASOPHILS NFR BLD AUTO: 1 %
BILIRUB SERPL-MCNC: 0.2 MG/DL
BUN SERPL-MCNC: 13.5 MG/DL (ref 8–23)
CALCIUM SERPL-MCNC: 9 MG/DL (ref 8.8–10.2)
CHLORIDE SERPL-SCNC: 105 MMOL/L (ref 98–107)
CREAT SERPL-MCNC: 0.78 MG/DL (ref 0.51–0.95)
DEPRECATED HCO3 PLAS-SCNC: 21 MMOL/L (ref 22–29)
EOSINOPHIL # BLD AUTO: 0.2 10E3/UL (ref 0–0.7)
EOSINOPHIL NFR BLD AUTO: 3 %
ERYTHROCYTE [DISTWIDTH] IN BLOOD BY AUTOMATED COUNT: 13.5 % (ref 10–15)
GFR SERPL CREATININE-BSD FRML MDRD: 86 ML/MIN/1.73M2
GLUCOSE SERPL-MCNC: 111 MG/DL (ref 70–99)
HCT VFR BLD AUTO: 41.6 % (ref 35–47)
HGB BLD-MCNC: 14.4 G/DL (ref 11.7–15.7)
IMM GRANULOCYTES # BLD: 0 10E3/UL
IMM GRANULOCYTES NFR BLD: 0 %
INR BLD: 1.6 (ref 0.9–1.1)
LYMPHOCYTES # BLD AUTO: 1.3 10E3/UL (ref 0.8–5.3)
LYMPHOCYTES NFR BLD AUTO: 20 %
Lab: NORMAL
MCH RBC QN AUTO: 29.3 PG (ref 26.5–33)
MCHC RBC AUTO-ENTMCNC: 34.6 G/DL (ref 31.5–36.5)
MCV RBC AUTO: 85 FL (ref 78–100)
MONOCYTES # BLD AUTO: 0.4 10E3/UL (ref 0–1.3)
MONOCYTES NFR BLD AUTO: 6 %
NEUTROPHILS # BLD AUTO: 4.5 10E3/UL (ref 1.6–8.3)
NEUTROPHILS NFR BLD AUTO: 71 %
PERFORMING LABORATORY: NORMAL
PLATELET # BLD AUTO: 245 10E3/UL (ref 150–450)
POTASSIUM SERPL-SCNC: 4 MMOL/L (ref 3.4–5.3)
PROT SERPL-MCNC: 6.4 G/DL (ref 6.4–8.3)
RBC # BLD AUTO: 4.91 10E6/UL (ref 3.8–5.2)
SODIUM SERPL-SCNC: 141 MMOL/L (ref 136–145)
SPECIMEN STATUS: NORMAL
WBC # BLD AUTO: 6.4 10E3/UL (ref 4–11)

## 2022-08-18 PROCEDURE — 85610 PROTHROMBIN TIME: CPT

## 2022-08-18 PROCEDURE — 80375 DRUG/SUBSTANCE NOS 1-3: CPT

## 2022-08-18 PROCEDURE — 80161 ASY CARBAMAZEPIN 10,11-EPXID: CPT | Mod: 90

## 2022-08-18 PROCEDURE — 80156 ASSAY CARBAMAZEPINE TOTAL: CPT | Mod: 90

## 2022-08-18 PROCEDURE — 85025 COMPLETE CBC W/AUTO DIFF WBC: CPT

## 2022-08-18 PROCEDURE — 80053 COMPREHEN METABOLIC PANEL: CPT

## 2022-08-18 PROCEDURE — 97110 THERAPEUTIC EXERCISES: CPT | Mod: GP | Performed by: PHYSICAL THERAPIST

## 2022-08-18 PROCEDURE — 99000 SPECIMEN HANDLING OFFICE-LAB: CPT

## 2022-08-18 PROCEDURE — 36415 COLL VENOUS BLD VENIPUNCTURE: CPT

## 2022-08-18 PROCEDURE — 36416 COLLJ CAPILLARY BLOOD SPEC: CPT

## 2022-08-18 RX ORDER — LEVOTHYROXINE SODIUM 300 UG/1
300 TABLET ORAL DAILY
Qty: 90 TABLET | Refills: 3 | Status: SHIPPED | OUTPATIENT
Start: 2022-08-18 | End: 2023-08-01

## 2022-08-18 RX ORDER — FEXOFENADINE HCL 180 MG/1
180 TABLET ORAL DAILY
Qty: 90 TABLET | Refills: 1 | Status: SHIPPED | OUTPATIENT
Start: 2022-08-18 | End: 2023-01-21

## 2022-08-18 NOTE — TELEPHONE ENCOUNTER
Medication Request  Medication name:   fexofenadine (ALLEGRA) 180 MG tablet  loratadine (CLARITIN) 10 MG tablet   levothyroxine (SYNTHROID/LEVOTHROID) 300 MCG tablet  Requested Pharmacy: [facility]  When was patient last seen for this?:  7/18/2022  Patient offered appointment:  N/A pharmacy called for request  Okay to leave a detailed message: no

## 2022-08-18 NOTE — TELEPHONE ENCOUNTER
tsh last check 3/16/22 stable,  rx prepped for allegra do not see loradine  on current meds list.not sure if wise to take both.  Will send to PCP to review and advise

## 2022-08-18 NOTE — PROGRESS NOTES
ANTICOAGULATION MANAGEMENT     Valentina Olmedo 62 year old female is on warfarin with subtherapeutic INR result. (Goal INR 2.0-3.0)    Recent labs: (last 7 days)     08/18/22  1344   INR 1.6*       ASSESSMENT       Source(s): Chart Review, Patient/Caregiver Call and Template       Warfarin doses taken: Warfarin taken as instructed    Reported she took one late warfarin dose this week.    Diet: No new diet changes identified    Continues to drink one can of protein shake daily.    New illness, injury, or hospitalization: No    Medication/supplement changes:  Yes.    Tegretol dose increased from 300mg to 400mg TID, which decrease INR result.   Concurrent use of CARBAMAZEPINE and WARFARIN may result in decreased anticoagulant effectiveness    Signs or symptoms of bleeding or clotting: No    Previous INR: Subtherapeutic at 1.7 on 8/2/22    Additional findings: None       PLAN     Recommended plan for ongoing change(s) affecting INR     Dosing Instructions:   (evenings. 5mg tabs)    Increase your warfarin dose (12.5% change) with next INR in 1-2 weeks       Summary  As of 8/18/2022    Full warfarin instructions:  7.5 mg every Tue; 10 mg all other days   Next INR check:  9/1/2022             Telephone call with   Valentina ([phone]) who verbalizes understanding and agrees to plan    Check at provider office visit - on 9/1/22 at [facility] with Dr. Starr.    Education provided: Potential interaction between warfarin and Tegretol dose increased and Monitoring for clotting signs and symptoms    Plan made per [facility] anticoagulation protocol    Shonna Ernandez, RN  Anticoagulation Clinic  8/18/2022    _______________________________________________________________________     Anticoagulation Episode Summary     Current INR goal:  2.0-3.0   TTR:  44.4 % (1 y)   Target end date:  Indefinite   Send INR reminders to:  [facility]    Indications    History of pulmonary embolism [Z86.711]  Pulmonary embolism with infarction (H)  [I26.99]           Comments:           Anticoagulation Care Providers     Provider Role Specialty Phone number    Promise Vanegas MD Referring Family Medicine [phone]    Donald Rooney MD Referring Internal Medicine [phone]

## 2022-08-18 NOTE — TELEPHONE ENCOUNTER
Please advise pt not to take fexofenadine with loratadine as they are similar meds and this could result in side effects.

## 2022-08-19 ENCOUNTER — DOCUMENTATION ONLY (OUTPATIENT)
Dept: FAMILY MEDICINE | Facility: CLINIC | Age: 62
End: 2022-08-19

## 2022-08-19 DIAGNOSIS — I26.99 PULMONARY EMBOLISM WITH INFARCTION (H): ICD-10-CM

## 2022-08-19 DIAGNOSIS — Z86.711 HISTORY OF PULMONARY EMBOLISM: Primary | ICD-10-CM

## 2022-08-19 DIAGNOSIS — Z79.01 LONG TERM (CURRENT) USE OF ANTICOAGULANTS: ICD-10-CM

## 2022-08-19 NOTE — PROGRESS NOTES
ANTICOAGULATION CLINIC REFERRAL RENEWAL REQUEST       An annual renewal order is required for all patients referred to [facility].?  Please review and sign the pended referral order for Valentina Olmedo.       ANTICOAGULATION SUMMARY      Warfarin indication(s)   PE with infarction    Mechanical heart valve present?  NO       Current goal range   INR: 2.0-3.0     Goal appropriate for indication? Goal INR 2-3, standard for indication(s) above     Time in Therapeutic Range (TTR)  (Goal > 60%) 44.4 %       Office visit with referring provider's group within last year Yes on 7/18/2022       Shonna Ernandez RN  [facility]

## 2022-08-21 ENCOUNTER — MEDICAL CORRESPONDENCE (OUTPATIENT)
Dept: HEALTH INFORMATION MANAGEMENT | Facility: CLINIC | Age: 62
End: 2022-08-21

## 2022-08-21 NOTE — PROGRESS NOTES
Discharge Note       08/18/22 1300   Signing Clinician's Name / Credentials   Signing clinician's name / credentials Griselda Mejiaradha PT, DPT, OCS, CLT   Session Number   Session Number 6/12   Progress Note/Recertification   Recertification Due Date 10/04/22   Adult Goals   PT Eval Goals 1;2;3   Goal 1   Goal Description Pt will be able to walk outside with 4WW with minimal SOB and difficulty for improved quality of physical condition in 90 days.   Goal Progress MET - doing well with walking with walker or wheelchair - if her legs or low back get tired she will sit and rest - not needing to do this for SOB anymore, will use her rescue inhaler if having SOB but does not happen everytime she walks anymore   Target Date 10/04/22   Goal 2   Goal Description Pt will be able to improve her walking distance with 2 minute walking test to improve her aerobic capacity towards age/gender norms for improved QOL in 90 days.   Goal Progress MET - overall improved as pt has been walking a significantly more amount than when she started PT   Target Date 10/04/22   Subjective Report   Subjective Report Pt reports having melanoma surgery on L shoulder since last visit and has recovered really well - didn't bother arm motion and skin feels pretty good. Pt reports she will add in a Vitamin D supplement as she is deficient and she is supposed to stay out of the sun. Pt reports she did have 3 days in 1 week she did have some auras and so she has had her epilepsy medication dosage increased. Pt was having migraine headaches at the same time. With new dosage pt is pleased that she isn't having more dizziness. Pt reports she is doing quite a bit of walking and sometimes with the humidity and heat she will use her inhaler but there are times when she is walking that she doesn't need her inhaler. Pt feels good about how well she is doing with walking.   Objective Measures   Objective Measures Objective Measure 1;Objective Measure 2;Objective  Measure 3;Objective Measure 4   Objective Measure 1   Objective Measure Gait - from last visit   Details 2 minute walk test - 252 ft with clinic 2WW and with mild SOB that she did not feel the need to use her inhaler for - pt was doing well with minimal UE pressure on 2WW for first ~1/2 of walking but then increased friction of walker on carpet as pt started to lean more significantly end of test due to legs getting tired - pt's gait quality demo's moderate B trendelenberg and lateral trunk lean as she fatigues - VITALS HR was over 100 bpm directly after test but was under 100 bpm within 1 minute afterwards and O2 sats 94-96% (was 84 ft initially with clinic 2WW and significant trunk lean and trendelenberg)   Objective Measure 2   Objective Measure ROM - from last visit   Details Neck ROM WNL except extension increases pt's dizziness and B SB min limited and has contralateral tightness both ways and pulls up into side of ear and headache   Objective Measure 3   Objective Measure Strength - from last visit   Details B UE grossly 5/5 without difficulty, B quad and DF 5/5 without difficulty, B hip flexor 4/5 with difficulty - no SOB with strength testing   Objective Measure 4   Objective Measure ROM   Details Shoulder ROM B WNL without pain or difficulty with good skin movement where melanoma was removed   Treatment Interventions   Interventions Therapeutic Procedure/Exercise;Neuromuscular Re-education   Therapeutic Procedure/exercise   Therapeutic Procedures: strength, endurance, ROM, flexibillity minutes (29754) 23   Skilled Intervention Reassessed response to activity level and HEP since last visit with another flare up of auras with migraines decreasing her ability to perform regular walking and HEP for about 3 days but otherwise good level of walking and activity. Reassessed shoulder ROM and 2 minute walk test and discussed results. Encouraged and advised long term compliance with walking program and ROM and  strengthening HEP to continue to have improvements of sx for conditioning, endurance and strength.   Education   Learner Patient   Readiness Eager   Method Booklet/handout   Response Verbalizes Understanding   Plan   Home program HEP - sit to stand, walking, seated march, LAQ and ankle pump, seated trunk rotation and SB B, standing trunk flexion stretch, standing hip abd, seated or standing shoulder abd   Plan for next session Discharge pt to I with HEP with goals met and pt pleased with how much walking and exercise she has been able to do.   Comments   Comments Pt joya's almost 3x improvement to distance with 2 minute walking test with minimal SOB but leg fatigue.  Pt joya's continued below age/gender norms aerobic endurance with 2 minute walk but joya's understanding of HEP and walking program to continue to improve her functional mobility and aerobic conditioning at home with HEP. Thank you for this referral!   Total Session Time   Timed Code Treatment Minutes 24   Total Treatment Time (sum of timed and untimed services) 24   AMBULATORY CLINICS ONLY-MEDICAL AND TREATMENT DIAGNOSIS   Medical Diagnosis COPD with dyspnea   PT Diagnosis Physical deconditioning with dyspnea

## 2022-08-22 PROBLEM — Z79.01 LONG TERM (CURRENT) USE OF ANTICOAGULANTS: Status: ACTIVE | Noted: 2022-08-22

## 2022-08-22 LAB
CARBAMAZEPINE EP SERPL-MCNC: 7.6 UG/ML
CARBAMAZEPINE SERPL-MCNC: 9 UG/ML

## 2022-08-24 LAB — MISCELLANEOUS TEST 1 (ARUP): NORMAL

## 2022-09-01 ENCOUNTER — ANTICOAGULATION THERAPY VISIT (OUTPATIENT)
Dept: ANTICOAGULATION | Facility: CLINIC | Age: 62
End: 2022-09-01

## 2022-09-01 ENCOUNTER — LAB (OUTPATIENT)
Dept: LAB | Facility: CLINIC | Age: 62
End: 2022-09-01
Payer: MEDICARE

## 2022-09-01 DIAGNOSIS — Z79.01 LONG TERM (CURRENT) USE OF ANTICOAGULANTS: ICD-10-CM

## 2022-09-01 DIAGNOSIS — Z86.711 HISTORY OF PULMONARY EMBOLISM: Primary | ICD-10-CM

## 2022-09-01 DIAGNOSIS — I26.99 PULMONARY EMBOLISM WITH INFARCTION (H): ICD-10-CM

## 2022-09-01 DIAGNOSIS — Z86.711 HISTORY OF PULMONARY EMBOLISM: ICD-10-CM

## 2022-09-01 LAB — INR BLD: 1.7 (ref 0.9–1.1)

## 2022-09-01 PROCEDURE — 85610 PROTHROMBIN TIME: CPT

## 2022-09-01 PROCEDURE — 36416 COLLJ CAPILLARY BLOOD SPEC: CPT

## 2022-09-01 NOTE — PROGRESS NOTES
"ANTICOAGULATION MANAGEMENT     Valentina Olmedo 62 year old female is on warfarin with subtherapeutic INR result. (Goal INR 2.0-3.0)    Recent labs: (last 7 days)     09/01/22  1401   INR 1.7*       ASSESSMENT       Source(s): Chart Review, Patient/Caregiver Call and Template       Warfarin doses taken: Warfarin taken as instructed    Diet: No new diet changes identified    Drinking same protein shake one can per day.    New illness, injury, or hospitalization: Yes:    Continues to complain of ongoing headaches.  Has RX for Imitrex she \"feels it's not working.\"   Dog scratched her the site where they do venipuncture.    Medication/supplement changes: None noted    Signs or symptoms of bleeding or clotting: No    Previous INR: Subtherapeutic last 2 INR results.    Additional findings: None       PLAN     Recommended plan for ongoing change(s) affecting INR     Dosing Instructions: Increase your warfarin dose (7.4% change) with next INR in 1-2 weeks       Summary  As of 9/1/2022    Full warfarin instructions:  12.5 mg every Thu; 10 mg all other days   Next INR check:  9/15/2022             Telephone call with  Valentina ([phone]) who verbalizes understanding and agrees to plan    - advised to follow-up with Dr. Starr d/t persisting headaches.  She does see a neurologist, however, he also recommended to f/u with PCP.    Lab visit scheduled - INR on 9/13/22.    Education provided: Importance of consistent vitamin K intake, Goal range and significance of current result and Monitoring for clotting signs and symptoms    Plan made per [facility] anticoagulation protocol    Shonna Ernandez RN  Anticoagulation Clinic  9/1/2022    _______________________________________________________________________     Anticoagulation Episode Summary     Current INR goal:  2.0-3.0   TTR:  41.2 % (1 y)   Target end date:  Indefinite   Send INR reminders to:  [facility]    Indications    History of pulmonary embolism " [Z86.711]  Pulmonary embolism with infarction (H) [I26.99]  Long term (current) use of anticoagulants [Z79.01]           Comments:           Anticoagulation Care Providers     Provider Role Specialty Phone number    Promise Vanegas MD Referring Family Medicine [phone]    Donald Rooney MD Referring Internal Medicine [phone]    Jose Maria Morin MD Referring Family Medicine [phone]

## 2022-09-02 DIAGNOSIS — E78.2 MIXED HYPERLIPIDEMIA: ICD-10-CM

## 2022-09-02 RX ORDER — ROSUVASTATIN CALCIUM 20 MG/1
20 TABLET, COATED ORAL DAILY
Qty: 90 TABLET | Refills: 3 | Status: SHIPPED | OUTPATIENT
Start: 2022-09-02 | End: 2023-08-03

## 2022-09-02 NOTE — TELEPHONE ENCOUNTER
Medication Request  Medication name: Rosuvastatin Calcium 20 MG Oral Tablet (CRESTOR)  Requested Pharmacy: [facility] #[id number]  When was patient last seen for this?:  07/18/2022  Patient offered appointment:  Pharmacy Request  Okay to leave a detailed message: no

## 2022-09-12 ENCOUNTER — TELEPHONE (OUTPATIENT)
Dept: FAMILY MEDICINE | Facility: CLINIC | Age: 62
End: 2022-09-12

## 2022-09-12 NOTE — TELEPHONE ENCOUNTER
General Call    Contacts       Type Contact Phone/Fax    09/12/2022 09:39 AM CDT Phone (Incoming) [email or web address] [phone]        Reason for Call:  Inquiring on Fax    What are your questions or concerns:  [facility] faxed over lab requisition request for patient on 09/07/22.    She said she has not received anything back yet, and is requesting it gets done and sent back to the fax number on the request form ASAP.

## 2022-09-13 ENCOUNTER — TELEPHONE (OUTPATIENT)
Dept: PULMONOLOGY | Facility: OTHER | Age: 62
End: 2022-09-13

## 2022-09-13 ENCOUNTER — LAB (OUTPATIENT)
Dept: LAB | Facility: CLINIC | Age: 62
End: 2022-09-13
Payer: MEDICARE

## 2022-09-13 ENCOUNTER — OFFICE VISIT (OUTPATIENT)
Dept: PULMONOLOGY | Facility: OTHER | Age: 62
End: 2022-09-13
Payer: MEDICARE

## 2022-09-13 ENCOUNTER — ANTICOAGULATION THERAPY VISIT (OUTPATIENT)
Dept: ANTICOAGULATION | Facility: CLINIC | Age: 62
End: 2022-09-13

## 2022-09-13 VITALS — HEART RATE: 76 BPM | SYSTOLIC BLOOD PRESSURE: 118 MMHG | OXYGEN SATURATION: 97 % | DIASTOLIC BLOOD PRESSURE: 72 MMHG

## 2022-09-13 DIAGNOSIS — Z79.01 LONG TERM (CURRENT) USE OF ANTICOAGULANTS: ICD-10-CM

## 2022-09-13 DIAGNOSIS — I26.99 PULMONARY EMBOLISM WITH INFARCTION (H): ICD-10-CM

## 2022-09-13 DIAGNOSIS — R53.81 PHYSICAL DECONDITIONING: ICD-10-CM

## 2022-09-13 DIAGNOSIS — K21.00 GASTROESOPHAGEAL REFLUX DISEASE WITH ESOPHAGITIS WITHOUT HEMORRHAGE: ICD-10-CM

## 2022-09-13 DIAGNOSIS — Z72.0 TOBACCO USER: ICD-10-CM

## 2022-09-13 DIAGNOSIS — R06.00 DYSPNEA, UNSPECIFIED TYPE: Primary | ICD-10-CM

## 2022-09-13 DIAGNOSIS — Z86.711 HISTORY OF PULMONARY EMBOLISM: ICD-10-CM

## 2022-09-13 DIAGNOSIS — Z86.711 HISTORY OF PULMONARY EMBOLISM: Primary | ICD-10-CM

## 2022-09-13 DIAGNOSIS — E66.01 MORBID OBESITY (H): ICD-10-CM

## 2022-09-13 LAB — INR BLD: 2 (ref 0.9–1.1)

## 2022-09-13 PROCEDURE — 36416 COLLJ CAPILLARY BLOOD SPEC: CPT

## 2022-09-13 PROCEDURE — 99214 OFFICE O/P EST MOD 30 MIN: CPT | Performed by: INTERNAL MEDICINE

## 2022-09-13 PROCEDURE — 85610 PROTHROMBIN TIME: CPT

## 2022-09-13 RX ORDER — ESOMEPRAZOLE MAGNESIUM 40 MG/1
40 CAPSULE, DELAYED RELEASE ORAL
Qty: 60 CAPSULE | Refills: 12 | Status: SHIPPED | OUTPATIENT
Start: 2022-09-13 | End: 2022-11-15

## 2022-09-13 NOTE — PROGRESS NOTES
ANTICOAGULATION MANAGEMENT     Valentina Olmedo 62 year old female is on warfarin with therapeutic INR result. (Goal INR 2.0-3.0)    Recent labs: (last 7 days)     09/13/22  1541   INR 2.0*       ASSESSMENT       Source(s): Chart Review and Patient/Caregiver Call       Warfarin doses taken: Warfarin taken as instructed    Diet: No new diet changes identified    New illness, injury, or hospitalization: No    Medication/supplement changes: slight reduction in tegretol dose    Signs or symptoms of bleeding or clotting: No    Previous INR: Subtherapeutic    Additional findings: None       PLAN     Recommended plan for no diet, medication or health factor changes affecting INR     Dosing Instructions: Continue your current warfarin dose with next INR in 2 weeks       Summary  As of 9/13/2022    Full warfarin instructions:  12.5 mg every Thu; 10 mg all other days   Next INR check:  9/27/2022             Telephone call with Valentina who verbalizes understanding and agrees to plan    Lab visit scheduled 9/27 at [facility] 2:15    Faxed to [facility] [phone]  Called to [facility] [phone]    Education provided: None required    Plan made per [facility] anticoagulation protocol    Gustavo Gamez RN  Anticoagulation Clinic  9/13/2022    _______________________________________________________________________     Anticoagulation Episode Summary     Current INR goal:  2.0-3.0   TTR:  39.6 % (1 y)   Target end date:  Indefinite   Send INR reminders to:  NET[facility]IANA    Indications    History of pulmonary embolism [Z86.711]  Pulmonary embolism with infarction (H) [I26.99]  Long term (current) use of anticoagulants [Z79.01]           Comments:           Anticoagulation Care Providers     Provider Role Specialty Phone number    Promise Vanegas MD Referring Family Medicine [phone]    Donald Rooney MD Referring Internal Medicine [phone]    Jose Maria Morin MD Referring Family Medicine [phone]

## 2022-09-13 NOTE — TELEPHONE ENCOUNTER
Prior Authorization Approval    Authorization Effective Date: 9/13/2022  Authorization Expiration Date: 11/13/2022  Medication: esomeprazole (NEXIUM) 40 MG DR capsule - APPROVED  Approved Dose/Quantity: 60 PER 60 DAYS   Insurance Company: [employer] - [employer] [phone] Fax [phone]  Which Pharmacy is filling the prescription (Not needed for infusion/clinic administered): [facility] [place] [address], [address]  Pharmacy Notified: Yes  Patient Notified: Yes (pharmacy will notify patient when ready)

## 2022-09-13 NOTE — PATIENT INSTRUCTIONS
Continue albuterol HFA as needed  Flonase one spray each nostril twice a day   Resume CPAP therapy  Avoid eating close to bedtime  Raise the head of the bed  Eat in the upright position   Change omeprazole to nexium twice a day   Referral to GI regarding ongoing GERD  Referral to pulmonary rehabilitation   Smoking cessation counseling  Chantix to quit smoking   Follow up 6 months

## 2022-09-13 NOTE — TELEPHONE ENCOUNTER
Prior Authorization Retail Medication Request    Medication/Dose:   ICD code (if different than what is on RX):      Previously Tried and Failed:    Rationale:        Insurance Name:  MEDICARE  Insurance ID:    [id number]    Pharmacy Information (if different than what is on RX)  Name:  [facility]  Phone:  [phone]

## 2022-09-13 NOTE — TELEPHONE ENCOUNTER
Left message to call back for: pt  Information to relay to patient: Per Dr. Starr pt needs appointment and why apply? xl

## 2022-09-13 NOTE — TELEPHONE ENCOUNTER
Central Prior Authorization Team   Phone: [phone]      PA Initiation    Medication: esomeprazole (NEXIUM) 40 MG DR capsule - INITIATED  Insurance Company: [employer] - Phone [phone] Fax [phone]  Pharmacy Filling the Rx: [facility] [place] [address], [address]  Filling Pharmacy Phone: [phone]  Filling Pharmacy Fax:    Start Date: 9/13/2022

## 2022-09-13 NOTE — PROGRESS NOTES
PULMONARY OUTPATIENT FOLLOW UP NOTE      Assessment:      1. Dyspnea  Secondary to significant deconditioning, body habitus   Tobacco user, 1/2 ppd for 45 years, currently 1 to 5 cig per day.   PFT showed normal spirometry, lung volumes and diffusion capacity.   Continue albuterol HFA as needed. Referral to pulmonary rehab   2. Allergic rhinitis   Nasal steroid  3. GERD  Likely the cause of her increased cough  Avoid eating close to bedtime. Raise the head of the bed  Change omeprazole to nexium BID, referral to GI for further evaluation   4. Sleep breathing disorder  Disrupted sleep, goes to bed between 10 to 10:30 PM, falls sleep between midnight to 1 AM, gets up at 10 AM, no naps.   Diagnosed with sleep apnea in 2015, sleep study done on 3/26/2015 showed decrease sleep time, AHI 1.8, per sleep physician, underestimated secondary to no stage REM sleep, significant hypoxia was note present. Patient was prescribed with CPAP , but she is not using it.   Sleep hygiene was discussed, attempt to use CPAP therapy.  5. Physical deconditioning   Referral to pulmonary rehab  6. Tobacco user  Smoking cessation counseling  Start Chantix again, nicotine inhaler.   7. Morbid obesity     Plan:      1. Continue albuterol HFA as needed  2. Flonase one spray each nostril twice a day   3. Resume CPAP therapy  4. Avoid eating close to bedtime  5. Raise the head of the bed  6. Eat in the upright position   7. Change omeprazole to nexium twice a day   8. Referral to GI regarding ongoing GERD  9. Referral to pulmonary rehabilitation   10. Smoking cessation counseling  11. Chantix to quit smoking   12. Follow up 6 months      Sen Balderas MD  Pulmonary Medicine  September 13, 2022     CC:     Chief Complaint   Patient presents with     Follow Up     COPD and cough       HPI:      Valentina Olmedo is a 62 year old female who presents for evaluation dyspnea.  Patient has history of HTN, hypothyroidism, GERD, schizoaffective  disorder, anxiety disorder, PTSD, history of pulmonary embolism anticoagulated, CHRIS, obesity, tobacco user.   Previously seen in this clinic for evaluation of dyspnea, related to severe deconditioning.   Limited mobility, uses walker in short distance, here in a wheelchair.      Patient reports recent worsening shortness of breath. Reports cough after she swallows for the past few weeks. She feels like her chest is congested and she can not breath. Denies fever, chills, body aches, nasal congestion, or chest tightness.   Patient was prescribed with serevent BID by PCP and albuterol inhaler. Infrequent use of Serevent , but she reports improvement in her breathing after using albuterol HFA.   She is still reporting dyspnea with exertion after 1-2 minutes of walking.    Denies history of asthma, reports tobacco use 1/2 ppd for 45 years, currently 1-5 cig per day.   Denies chest pain, orthopnea or PND, no swelling of LEs.   Denies fever, chills or night sweats.   Reports postnasal drip, nasal congestion, denies headaches, or sinus tenderness.   Acid reflux symptoms daily even with BID omeprazole. She is uncomfortable when she lays down due to reflux.   Reports disrupted sleep, goes to bed between 10 to 10:30 PM, falls sleep between midnight to 1 AM, gets up at 10 AM, no naps. Diagnosed with sleep apnea in 2015, sleep study done on 3/26/2015 showed decrease sleep time, AHI 1.8, per sleep physician, underestimated secondary to no stage REM sleep, significant hypoxia was note present. Patient was prescribed with CPAP, but she is not using it.     Past Medical History :     Past Medical History:   Diagnosis Date     Adrenal mass (H) 10/12/2015    Chen Null MD  They were incidentally discovered on the CAT scan of the abdomen from December 2011 which was done for evaluation of kidney stones. F/up CT of the abdomen done on 6/26/12 showed a stable 5 mm nodular opacity in the right lower lung lobe, adjacent to  the diagram. 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 According to the patient, the tumor was benign. She is not sure whether or not the tumor belonged to the thyroid gland. The surgery was done here at the [facility]. In January 2011, she was diagnosed with kidney stones. She was found to have an elevated calcium level of 11.7, with a PTH level of 368. Stone an     Primary hyperparathyroidism (H)            PTSD (post-traumatic stress disorder)      Pulmonary embolism with infarction (H) 1/12/2021     Recurrent otitis media      Seizure disorder (H)      Stroke (H)      Vertigo           Medications:     Current Outpatient Medications   Medication     albuterol (PROAIR HFA) 108 (90 Base) MCG/ACT inhaler     Brivaracetam (BRIVIACT) 100 MG tablet     Calcium Citrate-Vitamin D (CALCIUM + D PO)     carBAMazepine (TEGRETOL) 200 MG tablet     Cyanocobalamin (VITAMIN B12) 1000 MCG TBCR     DULoxetine (CYMBALTA) 60 MG capsule     EPINEPHrine (ADRENACLICK JR) 0.15 MG/0.15ML injection 2-pack     EPINEPHrine (ANY BX GENERIC EQUIV) 0.3 MG/0.3ML injection 2-pack     esomeprazole (NEXIUM) 40 MG DR capsule     fexofenadine (ALLEGRA) 180 MG tablet     fluticasone (FLONASE) 50 MCG/ACT nasal spray     hydrochlorothiazide (HYDRODIURIL) 25 MG tablet     hydrOXYzine (ATARAX) 10 MG tablet     levothyroxine (SYNTHROID/LEVOTHROID) 300 MCG tablet     melatonin 10 MG TABS tablet     multivitamin with iron (CHROMAGEN) TABS half-tab     nicotine (NICOTROL) 10  MG inhaler     nitroFURantoin macrocrystal-monohydrate (MACROBID) 100 MG capsule     Nutritional Supplements (NUTRITIONAL SHAKE HIGH PROTEIN) LIQD     olopatadine (PATADAY) 0.2 % ophthalmic solution     oxybutynin ER (DITROPAN XL) 10 MG 24 hr tablet     Polyethylene Glycol POWD     prazosin (MINIPRESS) 1 MG capsule     QUEtiapine (SEROQUEL) 200 MG tablet     rosuvastatin (CRESTOR) 20 MG tablet     salmeterol (SEREVENT) 50 MCG/DOSE inhaler     SENNA-TIME 8.6 MG tablet     SUMAtriptan (IMITREX) 50 MG tablet     terbinafine (LAMISIL) 250 MG tablet     tiZANidine (ZANAFLEX) 4 MG tablet     topiramate (TOPAMAX) 50 MG tablet     varenicline (CHANTIX RA) 0.5 MG X 11 & 1 MG X 42 tablet     Vitamin D-Vitamin K (VITAMIN K2-VITAMIN D3)  MCG-UNIT CAPS     vitamin D3 (CHOLECALCIFEROL) 50 mcg (2000 units) tablet     warfarin ANTICOAGULANT (COUMADIN) 5 MG tablet     No current facility-administered medications for this visit.       Social History :     Social History     Socioeconomic History     Marital status:      Spouse name: Not on file     Number of children: Not on file     Years of education: Not on file     Highest education level: Not on file   Occupational History     Not on file   Tobacco Use     Smoking status: Current Some Day Smoker     Packs/day: 0.25     Years: 35.00     Pack years: 8.75     Types: Cigarettes     Start date: 6/10/1974     Smokeless tobacco: Never Used     Tobacco comment: smoking about 4 cig per day   Vaping Use     Vaping Use: Never used   Substance and Sexual Activity     Alcohol use: No     Drug use: No     Sexual activity: Not Currently   Other Topics Concern     Not on file   Social History Narrative     Not on file     Social Determinants of Health     Financial Resource Strain: Not on file   Food Insecurity: Not on file   Transportation Needs: Not on file   Physical Activity: Not on file   Stress: Not on file   Social Connections: Not on file   Intimate Partner Violence: Not  on file   Housing Stability: Not on file          Family History :     Family History   Problem Relation Age of Onset     Lung Cancer Maternal Grandfather 76.00     Chronic Obstructive Pulmonary Disease Mother      Other - See Comments Father         estranged     Diabetes Sister      Depression Sister      Alcoholism Sister      Other - See Comments Brother         Missing since 1992     Alcoholism Brother      No Known Problems Sister         Half sister       Review of Systems  A 12 point comprehensive review of systems was negative except as noted.        Objective:     /72 (BP Location: Right arm, Patient Position: Chair, Cuff Size: Adult Large)   Pulse 76   LMP  (LMP Unknown)   SpO2 97%     Physical Exam  Constitutional:       General: She is not in acute distress.     Appearance: She is not ill-appearing or diaphoretic.      Comments: In wheelchair   HENT:      Nose: Nose normal.   Cardiovascular:      Rate and Rhythm: Normal rate and regular rhythm.      Pulses: Normal pulses.      Heart sounds: Normal heart sounds.   Pulmonary:      Effort: Pulmonary effort is normal. No respiratory distress.      Breath sounds: Normal breath sounds. No wheezing or rhonchi.   Skin:     General: Skin is warm and dry.      Findings: No rash.   Neurological:      Mental Status: She is alert.   Psychiatric:         Behavior: Behavior normal.            Diagnostic tests:      Nuc Med Stress Test 06/2021:  SUMMARY:  1.  Subjectively positive regadenoson infusion.  2.  Negative regadenoson ECG for ischemia.  3.  Regadenoson nuclear imaging does not suggest any ischemia or prior scar.  4.  Preserved wall motion as mentioned above.  5.  No prior scan is available for comparison.    PFTs:     FVC  2.78 L (82%)  FEV1 2.25 L (85%)  FEV1/FVC 81  TLC 4.87 L (92%)  RV 1.71 L (84%)  DLCO 94%         Sleep study:      Sleep study done on 3/26/2015 showed decrease sleep time, AHI 1.8, per sleep physician, underestimated secondary to  no stage REM sleep, significant hypoxia was note present.     IMAGES:     CT CHEST PULMONARY EMBOLISM W CONTRAST  LOCATION: [facility]  DATE/TIME: 8/31/2021 2:17 PM   INDICATION: Dyspnea, chronic, unclear etiology.  COMPARISON: None.  FINDINGS:  ANGIOGRAM CHEST: Pulmonary arteries are normal caliber and negative for pulmonary emboli. Thoracic aorta is negative for dissection or aneurysm.  LUNGS AND PLEURA: Minimal atelectasis or scarring. Otherwise, lungs are clear. No significant airway thickening or bronchiectasis. No pleural effusion or pneumothorax.  MEDIASTINUM/AXILLAE: No adenopathy. No pericardial effusion. Small hiatus hernia.  CORONARY ARTERY CALCIFICATION: None.  UPPER ABDOMEN: Hepatic steatosis. Stable 2.5 cm left adrenal adenoma requiring no routine follow-up.  MUSCULOSKELETAL: Normal.  IMPRESSION:  1.  No findings to explain symptoms.  2.  No pulmonary embolism.  3.  Hepatic steatosis.  4.  Small hiatus hernia.     XR CHEST 2 VW  LOCATION: [facility]  DATE/TIME: 12/9/2021 10:34 AM  INDICATION: Chronic cough and left chest wall pain. History of COPD.  COMPARISON: CT chest 8/31/2021 and one view chest 9/9/2019  IMPRESSION: Heart size and vascularity are normal. Focal subpleural airspace opacities within the left mid lung and lower lobe suggesting pneumonia. No pneumothorax nor pleural effusion.  Follow-up radiograph should be suggested to ensure resolution.    CT ABDOMEN PELVIS W CONTRAST  DATE/TIME: 5/7/2022 1:16 PM  INDICATION:  Pelvic and perineal pain.  COMPARISON: CT abdomen 05/23/2013.  FINDINGS:   LOWER CHEST: Normal.  HEPATOBILIARY: No acute abnormality. A hypodense nodule suggesting hemangioma is stable in size measuring 1.8 cm posterior right hepatic dome series 3 image 27. Gallbladder appears unremarkable.  PANCREAS: Normal.  SPLEEN: No acute abnormality. Adjacent splenule is again noted.  ADRENAL GLANDS: Hypodense nodule is 2.8 cm right  adrenal, previously 1.8 cm series 3 image 55. Normal left adrenal.  KIDNEYS/BLADDER: No hydronephrosis or urolithiasis. No acute abnormality. Bladder is unremarkable.  BOWEL: No obstruction. Normal appendix. No acute inflammatory change.  LYMPH NODES: Normal.  VASCULATURE: Unremarkable.  PELVIC ORGANS: Hysterectomy. No perineal abscess. No convincing acute abnormality.   MUSCULOSKELETAL: Height loss at L1, L2, and L5 vertebral bodies noted.  IMPRESSION:   1.  No acute abnormality is seen. No perineal or pelvic fluid collection identified.  2.  Larger size of a right adrenal nodule compared to 05/23/2013. This is technically indeterminate. This could be further correlated with adrenal specific CT or MRI.  3.  Height loss noted at L1, L2, and L5 vertebral bodies.

## 2022-09-14 NOTE — TELEPHONE ENCOUNTER
[facility] called back again wondering about the fax.  Let them know Dr. Morin wants to discuss during an appointment first.

## 2022-09-27 ENCOUNTER — ANTICOAGULATION THERAPY VISIT (OUTPATIENT)
Dept: ANTICOAGULATION | Facility: CLINIC | Age: 62
End: 2022-09-27

## 2022-09-27 ENCOUNTER — LAB (OUTPATIENT)
Dept: LAB | Facility: CLINIC | Age: 62
End: 2022-09-27
Payer: MEDICARE

## 2022-09-27 DIAGNOSIS — I26.99 PULMONARY EMBOLISM WITH INFARCTION (H): ICD-10-CM

## 2022-09-27 DIAGNOSIS — Z79.01 LONG TERM (CURRENT) USE OF ANTICOAGULANTS: ICD-10-CM

## 2022-09-27 DIAGNOSIS — Z86.711 HISTORY OF PULMONARY EMBOLISM: Primary | ICD-10-CM

## 2022-09-27 DIAGNOSIS — Z86.711 HISTORY OF PULMONARY EMBOLISM: ICD-10-CM

## 2022-09-27 DIAGNOSIS — Z53.9 DIAGNOSIS NOT YET DEFINED: Primary | ICD-10-CM

## 2022-09-27 LAB — INR BLD: 1.7 (ref 0.9–1.1)

## 2022-09-27 PROCEDURE — 85610 PROTHROMBIN TIME: CPT

## 2022-09-27 PROCEDURE — G0179 MD RECERTIFICATION HHA PT: HCPCS | Performed by: PSYCHIATRY & NEUROLOGY

## 2022-09-27 PROCEDURE — 36416 COLLJ CAPILLARY BLOOD SPEC: CPT

## 2022-09-27 NOTE — PROGRESS NOTES
ANTICOAGULATION MANAGEMENT     Valentina Olmedo 62 year old female is on warfarin with subtherapeutic INR result. (Goal INR 2.0-3.0)    Recent labs: (last 7 days)     09/27/22  1343   INR 1.7*       ASSESSMENT       Source(s): Chart Review, Patient/Caregiver Call and Template       Warfarin doses taken: Missed dose(s) may be affecting INR    Reported missed her 12.5mg warfarin dose on 9/22.    Diet: No new diet changes identified    Continues protein shake and has not changed brand.    New illness, injury, or hospitalization: No    Medication/supplement changes: None noted    On 9/20/22 stopped Oxybutnin and switched her to Gemtesa for overactive bladder.  (they will trial for one month, and if not effective, they will proceed with surgery).   A while back reported a decreased of herTegretol 400mg in morning, 200mg at 2pm, and 400mg at bedtime.  (d/t lightheaded spells)    Signs or symptoms of bleeding or clotting: No    Previous INR: Therapeutic last visit at 2.0; previously outside of goal range last 3 subtherapeutic INR results.    Additional findings: None       PLAN     Recommended plan for temporary change(s) and ongoing change(s) affecting INR     Dosing Instructions: booster dose then continue your current warfarin dose with next INR in 2 weeks       Summary  As of 9/27/2022    Full warfarin instructions:  9/27: 15 mg; Otherwise 12.5 mg every Tue, Thu, Sat; 10 mg all other days   Next INR check:  10/11/2022             Telephone call with  Valentina ([phone]) who verbalizes understanding and agrees to plan    Lab visit scheduled - INR on 10/11/22 @ [facility].    Education provided: Importance of consistent vitamin K intake, Goal range and significance of current result, Importance of taking warfarin as instructed, No interaction anticipated between warfarin and Gemtesa, Monitoring for clotting signs and symptoms and When to seek medical attention/emergency care    Plan made per [facility] anticoagulation  protocol    Shonna Ernandez, RN  Anticoagulation Clinic  9/27/2022    _______________________________________________________________________     Anticoagulation Episode Summary     Current INR goal:  2.0-3.0   TTR:  38.6 % (1 y)   Target end date:  Indefinite   Send INR reminders to:  [facility]    Indications    History of pulmonary embolism [Z86.711]  Pulmonary embolism with infarction (H) [I26.99]  Long term (current) use of anticoagulants [Z79.01]           Comments:           Anticoagulation Care Providers     Provider Role Specialty Phone number    Promise Vanegas MD Referring Family Medicine [phone]    Donald Rooney MD Referring Internal Medicine [phone]    Jose Maria Morin MD Referring Family Medicine [phone]

## 2022-09-29 ENCOUNTER — TELEPHONE (OUTPATIENT)
Dept: FAMILY MEDICINE | Facility: CLINIC | Age: 62
End: 2022-09-29

## 2022-09-29 DIAGNOSIS — Z79.01 LONG TERM (CURRENT) USE OF ANTICOAGULANTS: ICD-10-CM

## 2022-09-29 DIAGNOSIS — E03.9 ACQUIRED HYPOTHYROIDISM: ICD-10-CM

## 2022-09-29 DIAGNOSIS — G40.909 NONINTRACTABLE EPILEPSY WITHOUT STATUS EPILEPTICUS, UNSPECIFIED EPILEPSY TYPE (H): Primary | ICD-10-CM

## 2022-09-29 DIAGNOSIS — I10 ESSENTIAL HYPERTENSION: ICD-10-CM

## 2022-09-29 DIAGNOSIS — Z79.899 HIGH RISK MEDICATION USE: ICD-10-CM

## 2022-09-29 DIAGNOSIS — E78.5 HYPERLIPIDEMIA, UNSPECIFIED HYPERLIPIDEMIA TYPE: ICD-10-CM

## 2022-09-29 NOTE — TELEPHONE ENCOUNTER
Reason for Call:  Other call back    Detailed comments: Current medication nurse is leaving and we are looking to find a replacement agency, Which [facility] is willing to take     [facility] is requesting a reorder for home care by PCP to agency.  Also requesting a recent progress note from PCP and Health and physical report    [facility] FAX: [phone]    Phone Number Patient can be reached at: Other phone number:  [phone]*    Best Time: 8am-4:30pm (Anytime)    Can we leave a detailed message on this number? YES    Call taken on 9/29/2022 at 2:45 PM by Marcelo Beasley

## 2022-09-30 ENCOUNTER — TELEPHONE (OUTPATIENT)
Dept: BEHAVIORAL HEALTH | Facility: CLINIC | Age: 62
End: 2022-09-30

## 2022-09-30 NOTE — TELEPHONE ENCOUNTER
Hamida from [facility] called to speak to Dr. Puente or an RN regarding orders for the pt. Per Hamida, [facility] is ceasing operation and they found another agency to take over the pt's care. Hamida requests a call back at [phone].

## 2022-10-03 ENCOUNTER — MYC REFILL (OUTPATIENT)
Dept: PSYCHIATRY | Facility: CLINIC | Age: 62
End: 2022-10-03

## 2022-10-03 DIAGNOSIS — K59.09 CHRONIC CONSTIPATION: ICD-10-CM

## 2022-10-03 DIAGNOSIS — M81.8 OSTEOPOROSIS, IDIOPATHIC: ICD-10-CM

## 2022-10-03 DIAGNOSIS — Z76.0 ENCOUNTER FOR MEDICATION REFILL: Primary | ICD-10-CM

## 2022-10-03 DIAGNOSIS — R63.5 WEIGHT GAIN: ICD-10-CM

## 2022-10-03 NOTE — TELEPHONE ENCOUNTER
Date of Last Office Visit: 22  Date of Next Office Visit: 22  No shows since last visit: none  Cancellations since last visit: none    Medication requested: topiramate 50 mg  Date last ordered: 22 Qty: 90 Refills: 1     Review of [place] ?: NA    Lapse in medication adherence greater than 5 days?: no  If yes, call patient and gather details: NA  Medication refill request verified as identical to current order?: YES  Result of Last DAM, VPA, Li+ Level, CBC, or Carbamazepine Level (at or since last visit): N/A    Last visit treatment plan:   Medical Decision Makin. Schizoaffective disorder, depressive type (H)  Reports mood stable, denies depression.   Plan:   Continue Seroquel 200 mg nightly, Cymbalta 120 mg daily.      2. PTSD (post-traumatic stress disorder)   C/o increased anxiety r/t  René irritability.  Plan:   Prazosin 1mg q hs,   melatonin 10 mg q hs.  Hydroxyzine 10 mg twice daily as needed anxiety  Resume therapy with [facility].  Pt to get CD player & listen to music.     3. CHRIS (obstructive sleep apnea)  Working on trying new appliance. Feels better with CPAP.     4. High risk medication use  DISCUS score = 0 (done 10/05/21).  Increase Topamax 50 mg nightly to target appetite and weight.     5. Epilepsy  Managed by Vikki Benitez MD, [place] [facility].     Return in about 3 mos.-in-person appointment.      []Medication refilled per  Medication Refill in Ambulatory Care  policy.  [x]Medication unable to be refilled by RN due to criteria not met as indicated below:    []Eligibility - not seen in the last year   []Supervision - no future appointment   []Compliance - no shows, cancellations or lapse in therapy   []Verification - order discrepancy   []Controlled medication   []Medication not included in policy   [x]90-day supply request   []Other

## 2022-10-03 NOTE — TELEPHONE ENCOUNTER
Medication Request  Medication name: Vitamin D 50 MCG (2000 UT) Oral Tablet (CHOLECALCIFEROL)  Requested Pharmacy: [facility]  When was patient last seen for this?:  07/18/22  Patient offered appointment:  10/28/22  Okay to leave a detailed message: no

## 2022-10-04 RX ORDER — TOPIRAMATE 50 MG/1
50 TABLET, FILM COATED ORAL AT BEDTIME
Qty: 90 TABLET | Refills: 1 | Status: SHIPPED | OUTPATIENT
Start: 2022-10-04 | End: 2023-03-23

## 2022-10-04 RX ORDER — CHOLECALCIFEROL (VITAMIN D3) 50 MCG
2 TABLET ORAL DAILY
Qty: 180 TABLET | Refills: 3 | Status: SHIPPED | OUTPATIENT
Start: 2022-10-04 | End: 2023-08-28

## 2022-10-04 RX ORDER — SENNOSIDES 8.6 MG/1
TABLET, COATED ORAL
Qty: 360 TABLET | Refills: 3 | Status: SHIPPED | OUTPATIENT
Start: 2022-10-04 | End: 2023-10-10

## 2022-10-04 NOTE — TELEPHONE ENCOUNTER
"Routing refill request to provider for review/approval because:  Early refill requested.    Last Written Prescription Date:  11/8/21  Last Fill Quantity: 180,  # refills: 3   Last office visit provider:  7/18/22     Requested Prescriptions   Pending Prescriptions Disp Refills     vitamin D3 (CHOLECALCIFEROL) 50 mcg (2000 units) tablet 180 tablet 3     Sig: Take 2 tablets (100 mcg) by mouth daily       Vitamin Supplements (Adult) Protocol Passed - 10/3/2022  2:49 PM        Passed - High dose Vitamin D not ordered        Passed - Recent (12 mo) or future (30 days) visit within the authorizing provider's specialty     Patient has had an office visit with the authorizing provider or a provider within the authorizing providers department within the previous 12 mos or has a future within next 30 days. See \"Patient Info\" tab in inbasket, or \"Choose Columns\" in Meds & Orders section of the refill encounter.              Passed - Medication is active on med list             Adalid Anderson RN 10/04/22 12:48 PM  "

## 2022-10-04 NOTE — TELEPHONE ENCOUNTER
Return phone call to [facility] for continuation of care. Informed home care agency that [facility] is needed. Provided phone [facility] and [facility]    Mary Beth Morrison RN on 10/4/2022 at 11:49 AM

## 2022-10-04 NOTE — TELEPHONE ENCOUNTER
[facility] calling to inform patient's PCP that they are unable to accept patient due to staffing shortage.

## 2022-10-05 NOTE — TELEPHONE ENCOUNTER
Jacobo has been called he stated he is ok with anyone helping with skilled nursing 1 x per week. Please place home care referral for patient for [facility] care hopefully they will be willing to help

## 2022-10-06 NOTE — TELEPHONE ENCOUNTER
Jacobo has called back he reached out to [facility] and they will take Valentina, notes have been refaxed per his request

## 2022-10-11 ENCOUNTER — LAB (OUTPATIENT)
Dept: LAB | Facility: CLINIC | Age: 62
End: 2022-10-11
Payer: MEDICARE

## 2022-10-11 ENCOUNTER — ANTICOAGULATION THERAPY VISIT (OUTPATIENT)
Dept: ANTICOAGULATION | Facility: CLINIC | Age: 62
End: 2022-10-11

## 2022-10-11 DIAGNOSIS — I26.99 PULMONARY EMBOLISM WITH INFARCTION (H): ICD-10-CM

## 2022-10-11 DIAGNOSIS — Z86.711 HISTORY OF PULMONARY EMBOLISM: ICD-10-CM

## 2022-10-11 DIAGNOSIS — Z86.711 HISTORY OF PULMONARY EMBOLISM: Primary | ICD-10-CM

## 2022-10-11 DIAGNOSIS — F17.210 CIGARETTE NICOTINE DEPENDENCE: Primary | ICD-10-CM

## 2022-10-11 DIAGNOSIS — Z79.01 LONG TERM (CURRENT) USE OF ANTICOAGULANTS: ICD-10-CM

## 2022-10-11 LAB — INR BLD: 1.7 (ref 0.9–1.1)

## 2022-10-11 PROCEDURE — 36416 COLLJ CAPILLARY BLOOD SPEC: CPT

## 2022-10-11 PROCEDURE — 85610 PROTHROMBIN TIME: CPT

## 2022-10-11 RX ORDER — VARENICLINE TARTRATE 1 MG/1
1 TABLET, FILM COATED ORAL 2 TIMES DAILY
Qty: 60 TABLET | Refills: 1 | Status: SHIPPED | OUTPATIENT
Start: 2022-10-11 | End: 2022-11-21

## 2022-10-11 NOTE — PROGRESS NOTES
ANTICOAGULATION MANAGEMENT     Valentina Olmedo 62 year old female is on warfarin with subtherapeutic INR result. (Goal INR 2.0-3.0)    Recent labs: (last 7 days)     10/11/22  1344   INR 1.7*       ASSESSMENT       Source(s): Chart Review and Patient/Caregiver Call       Warfarin doses taken: Less warfarin taken than planned which may be affecting INR has been taking 12.5 mg only once per week    Diet: No new diet changes identified    New illness, injury, or hospitalization: No    Medication/supplement changes: None noted    Signs or symptoms of bleeding or clotting: No    Previous INR: Subtherapeutic    Additional findings: None       PLAN     Recommended plan for no diet, medication or health factor changes affecting INR     Dosing Instructions: Continue your current warfarin dose as previously planned with next INR in 2 weeks       Summary  As of 10/11/2022    Full warfarin instructions:  12.5 mg every Tue, Thu, Sat; 10 mg all other days   Next INR check:  10/28/2022             Telephone call with Valentina who verbalizes understanding and agrees to plan    Lab visit scheduled with  10/28  Called to nurse Mei and faxed to them as well  [phone]    Education provided: None required    Plan made per [facility] anticoagulation protocol    Gustavo Gamez RN  Anticoagulation Clinic  10/11/2022    _______________________________________________________________________     Anticoagulation Episode Summary     Current INR goal:  2.0-3.0   TTR:  36.6 % (1 y)   Target end date:  Indefinite   Send INR reminders to:  [facility]    Indications    History of pulmonary embolism [Z86.711]  Pulmonary embolism with infarction (H) [I26.99]  Long term (current) use of anticoagulants [Z79.01]           Comments:           Anticoagulation Care Providers     Provider Role Specialty Phone number    Promise Vanegas MD Referring Family Medicine [phone]    Donald Rooney MD Referring Internal Medicine [phone]      Jose Maria Muhammad MD [facility] Medicine [phone]

## 2022-10-12 ENCOUNTER — TELEPHONE (OUTPATIENT)
Dept: PSYCHIATRY | Facility: CLINIC | Age: 62
End: 2022-10-12

## 2022-10-12 ENCOUNTER — TELEPHONE (OUTPATIENT)
Dept: NURSING | Facility: CLINIC | Age: 62
End: 2022-10-12

## 2022-10-12 NOTE — TELEPHONE ENCOUNTER
Gave verbal orders to transfer care to [facility] so they can do start of care for skilled nursing. Last OV 7/18/22. Please advise if do not agree.

## 2022-10-12 NOTE — TELEPHONE ENCOUNTER
The Home Care/Assisted Living/Nursing Facility is calling regarding an established patient.  Has the patient seen Home Care in the past or is currently residing in Assisted Living or Nursing Facility? Yes.     Crystal calling from [facility] requesting the following orders that are within the Home Care, Assisted Living or Nursing Home Eval and Treatment standing order and can be signed as standing order signature required by RN.    Preferred Call Back Number: [phone]    Home Care Visits Continuation    Any additional Orders:  Are there any orders requested, not stated above, that are outside of the standing order and must be routed to a licensed practitioner for approval?    Yes: Start of care order to start skill nursing care tomorrow, she used to be with [facility], going out of service and was referred to [facility].    Writer has verified [facility] will send fax to have orders signed.    Hemanth Tavarez RN, BSN  10/12/2022 at 2:11 PM  [facility]

## 2022-10-12 NOTE — TELEPHONE ENCOUNTER
Left message for Shaina to call back we are happy to help just needing to know what orders are needed for them

## 2022-10-12 NOTE — TELEPHONE ENCOUNTER
Received completed TRAVIS from [employer] and signed by this patient. RN requested document be labeled and faxed to [facility] to be loaded into patients chart.     Janet Stafford RN on 10/12/2022 at 3:19 PM

## 2022-10-13 ENCOUNTER — MEDICAL CORRESPONDENCE (OUTPATIENT)
Dept: HEALTH INFORMATION MANAGEMENT | Facility: CLINIC | Age: 62
End: 2022-10-13

## 2022-10-17 ENCOUNTER — TELEPHONE (OUTPATIENT)
Dept: PSYCHIATRY | Facility: CLINIC | Age: 62
End: 2022-10-17

## 2022-10-17 NOTE — TELEPHONE ENCOUNTER
Received faxed paperwork from [facility] for orders to be signed and faxed back. RN scanned to providers e mail and placed a copy in providers physical mail file.     Janet Stafford RN on 10/17/2022 at 4:22 PM

## 2022-10-22 ENCOUNTER — HEALTH MAINTENANCE LETTER (OUTPATIENT)
Age: 62
End: 2022-10-22

## 2022-10-24 DIAGNOSIS — M80.00XD AGE-RELATED OSTEOPOROSIS WITH CURRENT PATHOLOGICAL FRACTURE WITH ROUTINE HEALING, SUBSEQUENT ENCOUNTER: Primary | ICD-10-CM

## 2022-10-24 DIAGNOSIS — N18.32 STAGE 3B CHRONIC KIDNEY DISEASE (H): ICD-10-CM

## 2022-10-25 ENCOUNTER — MYC REFILL (OUTPATIENT)
Dept: PSYCHIATRY | Facility: CLINIC | Age: 62
End: 2022-10-25

## 2022-10-25 DIAGNOSIS — F43.10 PTSD (POST-TRAUMATIC STRESS DISORDER): ICD-10-CM

## 2022-10-25 RX ORDER — PRAZOSIN HYDROCHLORIDE 1 MG/1
1 CAPSULE ORAL AT BEDTIME
Qty: 30 CAPSULE | Refills: 1 | Status: SHIPPED | OUTPATIENT
Start: 2022-10-25 | End: 2022-11-01

## 2022-10-25 NOTE — TELEPHONE ENCOUNTER
Date of Last Office Visit: 2022  Date of Next Office Visit: 2022  No shows since last visit: 0  Cancellations since last visit: 0    Medication requested: prazosin (MINIPRESS) 1 MG capsule Date last ordered: 2022 Qty: 30 Refills: 1     Review of [place] ?: NA    Lapse in medication adherence greater than 5 days?: No    Medication refill request verified as identical to current order?: Yes  Result of Last DAM, VPA, Li+ Level, CBC, or Carbamazepine Level (at or since last visit): N/A    Last visit treatment plan:   Medical Decision Makin. Schizoaffective disorder, depressive type (H)  Reports mood stable, denies depression.   Plan:   Continue Seroquel 200 mg nightly, Cymbalta 120 mg daily.        2. PTSD (post-traumatic stress disorder)   C/o increased anxiety r/t  René irritability.  Plan:   Prazosin 1mg q hs,   melatonin 10 mg q hs.  Hydroxyzine 10 mg twice daily as needed anxiety  Resume therapy with [facility].  Pt to get CD player & listen to music.        3. CHRIS (obstructive sleep apnea)  Working on trying new appliance. Feels better with CPAP.     4. High risk medication use  DISCUS score = 0 (done 10/05/21).  Increase Topamax 50 mg nightly to target appetite and weight.        5. Epilepsy  Managed by Vikki Benitez MD, [place] [facility].        Return in about 3 mos.-in-person appointment.      []Medication refilled per  Medication Refill in Ambulatory Care  policy.  [x]Medication unable to be refilled by RN due to criteria not met as indicated below:    []Eligibility - not seen in the last year   []Supervision - no future appointment   []Compliance - no shows, cancellations or lapse in therapy   []Verification - order discrepancy   []Controlled medication   [x]Medication not included in policy   []90-day supply request   []Other

## 2022-10-28 ENCOUNTER — OFFICE VISIT (OUTPATIENT)
Dept: FAMILY MEDICINE | Facility: CLINIC | Age: 62
End: 2022-10-28
Payer: MEDICARE

## 2022-10-28 ENCOUNTER — ANTICOAGULATION THERAPY VISIT (OUTPATIENT)
Dept: ANTICOAGULATION | Facility: CLINIC | Age: 62
End: 2022-10-28

## 2022-10-28 VITALS
SYSTOLIC BLOOD PRESSURE: 122 MMHG | DIASTOLIC BLOOD PRESSURE: 80 MMHG | WEIGHT: 293 LBS | TEMPERATURE: 98.7 F | OXYGEN SATURATION: 95 % | HEIGHT: 67 IN | BODY MASS INDEX: 45.99 KG/M2 | RESPIRATION RATE: 16 BRPM | HEART RATE: 96 BPM

## 2022-10-28 DIAGNOSIS — Z85.820 HISTORY OF MELANOMA: Primary | ICD-10-CM

## 2022-10-28 DIAGNOSIS — G40.909 NONINTRACTABLE EPILEPSY WITHOUT STATUS EPILEPTICUS, UNSPECIFIED EPILEPSY TYPE (H): ICD-10-CM

## 2022-10-28 DIAGNOSIS — I26.99 PULMONARY EMBOLISM WITH INFARCTION (H): ICD-10-CM

## 2022-10-28 DIAGNOSIS — Z86.711 HISTORY OF PULMONARY EMBOLISM: Primary | ICD-10-CM

## 2022-10-28 DIAGNOSIS — Z86.711 HISTORY OF PULMONARY EMBOLISM: ICD-10-CM

## 2022-10-28 DIAGNOSIS — Z79.01 LONG TERM (CURRENT) USE OF ANTICOAGULANTS: ICD-10-CM

## 2022-10-28 LAB — INR BLD: 1.7 (ref 0.9–1.1)

## 2022-10-28 PROCEDURE — 85610 PROTHROMBIN TIME: CPT | Performed by: FAMILY MEDICINE

## 2022-10-28 PROCEDURE — 91312 COVID-19,PF,PFIZER BOOSTER BIVALENT: CPT | Performed by: FAMILY MEDICINE

## 2022-10-28 PROCEDURE — G0008 ADMIN INFLUENZA VIRUS VAC: HCPCS | Performed by: FAMILY MEDICINE

## 2022-10-28 PROCEDURE — 0124A COVID-19,PF,PFIZER BOOSTER BIVALENT: CPT | Performed by: FAMILY MEDICINE

## 2022-10-28 PROCEDURE — 99213 OFFICE O/P EST LOW 20 MIN: CPT | Mod: 25 | Performed by: FAMILY MEDICINE

## 2022-10-28 PROCEDURE — 36416 COLLJ CAPILLARY BLOOD SPEC: CPT | Performed by: FAMILY MEDICINE

## 2022-10-28 PROCEDURE — 90682 RIV4 VACC RECOMBINANT DNA IM: CPT | Performed by: FAMILY MEDICINE

## 2022-10-28 ASSESSMENT — ENCOUNTER SYMPTOMS: CONSTITUTIONAL NEGATIVE: 1

## 2022-10-28 ASSESSMENT — PATIENT HEALTH QUESTIONNAIRE - PHQ9
SUM OF ALL RESPONSES TO PHQ QUESTIONS 1-9: 7
10. IF YOU CHECKED OFF ANY PROBLEMS, HOW DIFFICULT HAVE THESE PROBLEMS MADE IT FOR YOU TO DO YOUR WORK, TAKE CARE OF THINGS AT HOME, OR GET ALONG WITH OTHER PEOPLE: VERY DIFFICULT
SUM OF ALL RESPONSES TO PHQ QUESTIONS 1-9: 7

## 2022-10-28 NOTE — ASSESSMENT & PLAN NOTE
This patient was contacted by a private lab and solicited to enroll in genetic testing for different types of cancers.  We reviewed the request.  There is not a family history or personal history of cancers that have a strong genetic component.  She is getting routine surveillance for breast cancer, colon cancer.  She is follows up with her dermatologist later this year.  Together, we decided not to pursue genetic testing through this organization.  If at some point in the future she is interested in this, I would refer her through genetics counseling.

## 2022-10-28 NOTE — PROGRESS NOTES
"  Problem List Items Addressed This Visit     History of melanoma - Primary     This patient was contacted by a private lab and solicited to enroll in genetic testing for different types of cancers.  We reviewed the request.  There is not a family history or personal history of cancers that have a strong genetic component.  She is getting routine surveillance for breast cancer, colon cancer.  She is follows up with her dermatologist later this year.  Together, we decided not to pursue genetic testing through this organization.  If at some point in the future she is interested in this, I would refer her through genetics counseling.               Magda Montgomery is a 62 year old who presents for the following health issues  accompanied by her spouse  Chief Complaint   Patient presents with     Forms      History of Present Illness       Reason for visit:  Questionaire about cancer and shots if needed apalo    She eats 0-1 servings of fruits and vegetables daily.She consumes 1 sweetened beverage(s) daily.She exercises with enough effort to increase her heart rate 9 or less minutes per day.  She exercises with enough effort to increase her heart rate 3 or less days per week.   She is taking medications regularly.    Today's PHQ-9         PHQ-9 Total Score: 7    PHQ-9 Q9 Thoughts of better off dead/self-harm past 2 weeks :   Several days  Thoughts of suicide or self harm: (P) No  Self-harm Plan:     Self-harm Action:       Safety concerns for self or others: (P) No    How difficult have these problems made it for you to do your work, take care of things at home, or get along with other people: Very difficult       Review of Systems   Constitutional: Negative.    All other systems reviewed and are negative.           Objective    /80 (BP Location: Left arm, Patient Position: Sitting, Cuff Size: Adult Large)   Pulse 96   Temp 98.7  F (37.1  C) (Oral)   Resp 16   Ht 1.689 m (5' 6.5\")   Wt (!) 150.7 kg (332 lb 5 " oz)   LMP  (LMP Unknown)   SpO2 95%   BMI 52.83 kg/m    Body mass index is 52.83 kg/m .  Physical Exam  Nursing note reviewed.   Constitutional:       General: She is not in acute distress.     Appearance: Normal appearance. She is not ill-appearing.   HENT:      Head: Normocephalic and atraumatic.   Eyes:      Extraocular Movements: Extraocular movements intact.      Conjunctiva/sclera: Conjunctivae normal.   Pulmonary:      Effort: Pulmonary effort is normal.   Neurological:      Mental Status: She is alert and oriented to person, place, and time.   Psychiatric:         Attention and Perception: Attention normal.         Mood and Affect: Mood normal.         Speech: Speech normal.         Thought Content: Thought content normal.                  This note has been dictated using voice recognition software. Any grammatical or context distortions are unintentional and inherent to the software

## 2022-10-29 NOTE — ASSESSMENT & PLAN NOTE
The patient has received skilled nursing for a number of years.  She recently had to change agencies.  Order placed for home care.  Today's visit is a face-to-face visit.

## 2022-11-01 ENCOUNTER — MEDICAL CORRESPONDENCE (OUTPATIENT)
Dept: HEALTH INFORMATION MANAGEMENT | Facility: CLINIC | Age: 62
End: 2022-11-01

## 2022-11-01 ENCOUNTER — OFFICE VISIT (OUTPATIENT)
Dept: PSYCHIATRY | Facility: CLINIC | Age: 62
End: 2022-11-01
Payer: MEDICARE

## 2022-11-01 VITALS — DIASTOLIC BLOOD PRESSURE: 66 MMHG | SYSTOLIC BLOOD PRESSURE: 120 MMHG | HEART RATE: 86 BPM

## 2022-11-01 DIAGNOSIS — F33.1 MAJOR DEPRESSIVE DISORDER, RECURRENT EPISODE, MODERATE (H): Primary | ICD-10-CM

## 2022-11-01 DIAGNOSIS — F25.1 SCHIZOAFFECTIVE DISORDER, DEPRESSIVE TYPE (H): ICD-10-CM

## 2022-11-01 PROCEDURE — 99215 OFFICE O/P EST HI 40 MIN: CPT | Performed by: PSYCHIATRY & NEUROLOGY

## 2022-11-01 RX ORDER — QUETIAPINE FUMARATE 150 MG/1
150 TABLET, FILM COATED ORAL AT BEDTIME
Qty: 90 TABLET | Refills: 1 | Status: SHIPPED | OUTPATIENT
Start: 2022-11-01 | End: 2023-04-18

## 2022-11-01 NOTE — PATIENT INSTRUCTIONS
Tell your neurologist you feel overmedicated.  Dr. Puente is working at decreasing med doses.    Stopped hydroxyzine and prazosin.  Seroquel reduced from 200 to 150mg.  Ask neurologist if she can decrease any med doses or check levels.

## 2022-11-01 NOTE — PROGRESS NOTES
"      Medical Decision Makin. Schizoaffective disorder, depressive type (H)  \"I am depressed, do not get out of bed, hard time of year around the holidays. I feel overmedicated.\"  Pt requests dose reduction of meds.  Plan:   discontinue hydroxyzine   Discontinue prazosin  decrease Seroquel 150 mg nightly,   Continue Cymbalta 120 mg daily.  Pt to tell neurologist Dr. Puente is working at decreasing med doses.    Ask neurologist if she can decrease any med doses or check levels of AED's.        2. PTSD (post-traumatic stress disorder)   C/o increased anxiety/depression.  Plan:   Refer to therapy, called behavioral health access.  2/15/23 therapy appt w/ Veronica TAMAYO. On cancellation wait-list.      3. CHRIS (obstructive sleep apnea)  Inconsistent adherence.  Plan:   Pt and  enc to give reward for using (ride in car).       4. High risk medication use  DISCUS score = 0 (done 10/05/21).  Topamax 50 mg nightly to target appetite and weight.      5. Epilepsy  Managed by Vikki Benitez MD, [place] [facility]- appt 22.      Return in about 2 mos.-in-person appointment.         History of Present Illness:   Valentina Olmedo is a 62 y.o. female here for SCAD, bipolar type, PTSD, Neurocognitive disorder, seizure disorder here for f/u.    Pt reports increased depression.      In-home services:  -[facility]  -Med set up q Monday  -[facility], [facility]    Weekly INR.      SOC: Lives with  René.    Chronic, René. Cat, \"Yosi\".       ROS:  Denies SI/manic/sx.  See HPI, otherwise negative.      Current Outpatient Medications:      DULoxetine (CYMBALTA) 60 MG capsule, Take 2 capsules (120 mg) by mouth daily, Disp: 180 capsule, Rfl: 3     levothyroxine (SYNTHROID/LEVOTHROID) 300 MCG tablet, Take 1 tablet (300 mcg) by mouth daily, Disp: 90 tablet, Rfl: 3     QUEtiapine 150 MG TABS, Take 150 mg by mouth At Bedtime, Disp: 90 tablet, Rfl: 1     albuterol (PROAIR HFA) 108 (90 Base) MCG/ACT " inhaler, Inhale 2 puffs into the lungs every 4 hours as needed for shortness of breath / dyspnea or wheezing, Disp: 8 g, Rfl: 4     Brivaracetam (BRIVIACT) 100 MG tablet, Take 150 mg by mouth 2 times daily , Disp: , Rfl:      Calcium Citrate-Vitamin D (CALCIUM + D PO), Take 1 tablet by mouth daily , Disp: , Rfl:      carBAMazepine (TEGRETOL) 200 MG tablet, 400mg in the  at lunch and 400mg at HS, Disp: , Rfl:      Cyanocobalamin (VITAMIN B12) 1000 MCG TBCR, Take 1 tablet by mouth, Disp: , Rfl:      EPINEPHrine (ADRENACLICK JR) 0.15 MG/0.15ML injection 2-pack, Inject 0.15 mLs (0.15 mg) into the muscle as needed for anaphylaxis, Disp: 2 each, Rfl: 1     EPINEPHrine (ANY BX GENERIC EQUIV) 0.3 MG/0.3ML injection 2-pack, Inject 0.3 mLs (0.3 mg) into the muscle as needed for anaphylaxis, Disp: 2 each, Rfl: 1     esomeprazole (NEXIUM) 40 MG DR capsule, Take 1 capsule (40 mg) by mouth every morning (before breakfast) Take 30-60 minutes before eating., Disp: 60 capsule, Rfl: 12     fexofenadine (ALLEGRA) 180 MG tablet, Take 1 tablet (180 mg) by mouth daily, Disp: 90 tablet, Rfl: 1     fluticasone (FLONASE) 50 MCG/ACT nasal spray, INHALE 1 SPRAY IN EACH NOSTRIL DAILY, Disp: 16 g, Rfl: 4     hydrochlorothiazide (HYDRODIURIL) 25 MG tablet, Take 1 tablet (25 mg) by mouth daily, Disp: 90 tablet, Rfl: 3     melatonin 10 MG TABS tablet, Take 1 tablet (10 mg) by mouth nightly as needed for sleep, Disp: 30 tablet, Rfl: 2     multivitamin with iron (CHROMAGEN) TABS half-tab, Take 1 tablet by mouth daily, Disp: , Rfl:      nicotine (NICOTROL) 10 MG inhaler, Use 1 cartridge as needed for urge to smoke by puffing over course of 20min.  Use 6-16 cart/day; reduce number of cart/day over 6-12 weeks., Disp: 168 each, Rfl: 1     nitroFURantoin macrocrystal-monohydrate (MACROBID) 100 MG capsule, Take 1 capsule (100 mg) by mouth 2 times daily (Patient not taking: Reported on 10/28/2022), Disp: 10 capsule, Rfl: 0     Nutritional Supplements  (NUTRITIONAL SHAKE HIGH PROTEIN) LIQD, Take 1 each by mouth every morning Dispense Premier protein shakes. Replace breakfast with 1 shake daily., Disp: 9900 mL, Rfl: 11     olopatadine (PATADAY) 0.2 % ophthalmic solution, Place 1 drop into both eyes daily, Disp: 2.5 mL, Rfl: 5     oxybutynin ER (DITROPAN XL) 10 MG 24 hr tablet, Take 1 tablet (10 mg) by mouth daily, Disp: 90 tablet, Rfl: 3     Polyethylene Glycol POWD, See Admin Instructions Use daily as needed, Disp: , Rfl:      rosuvastatin (CRESTOR) 20 MG tablet, Take 1 tablet (20 mg) by mouth daily, Disp: 90 tablet, Rfl: 3     salmeterol (SEREVENT) 50 MCG/DOSE inhaler, Inhale 1 puff into the lungs 2 times daily, Disp: 180 each, Rfl: 4     SENNA-TIME 8.6 MG tablet, TAKE 2 TABLETS (17.2MG) BY MOUTH TWICE A DAY, Disp: 360 tablet, Rfl: 3     SUMAtriptan (IMITREX) 50 MG tablet, Take 1 tablet (50 mg) by mouth at onset of headache for migraine May repeat in 2 hours. Max 4 tablets/24 hours., Disp: 9 tablet, Rfl: 1     terbinafine (LAMISIL) 250 MG tablet, Take 250 mg by mouth daily, Disp: , Rfl:      tiZANidine (ZANAFLEX) 4 MG tablet, Take 1 tablet (4 mg) by mouth 3 times daily, Disp: 42 tablet, Rfl: 3     topiramate (TOPAMAX) 50 MG tablet, Take 1 tablet (50 mg) by mouth At Bedtime, Disp: 90 tablet, Rfl: 1     varenicline (CHANTIX RA) 0.5 MG X 11 & 1 MG X 42 tablet, Take 0.5 mg tab daily for 3 days, THEN 0.5 mg tab twice daily for 4 days, THEN 1 mg twice daily., Disp: 53 tablet, Rfl: 0     varenicline (CHANTIX) 1 MG tablet, Take 1 tablet (1 mg) by mouth 2 times daily, Disp: 60 tablet, Rfl: 1     Vitamin D-Vitamin K (VITAMIN K2-VITAMIN D3)  MCG-UNIT CAPS, 2 capsules, Disp: , Rfl:      vitamin D3 (CHOLECALCIFEROL) 50 mcg (2000 units) tablet, Take 2 tablets (100 mcg) by mouth daily, Disp: 180 tablet, Rfl: 3     warfarin ANTICOAGULANT (COUMADIN) 5 MG tablet, New dose - take 2 tablets (10mg) to 3 tablets (15mg) by mouth daily, as directed.  Adjust dose based on INR  results., Disp: 210 tablet, Rfl: 1    Current Facility-Administered Medications:      [START ON 11/7/2022] denosumab (PROLIA) injection 60 mg, 60 mg, Subcutaneous, Q6 Months, Alice Rolle MD       /66 (BP Location: Right arm, Patient Position: Sitting, Cuff Size: Adult Large)   Pulse 86   LMP  (LMP Unknown)      MSE:      Alert & oriented x 3.   Appearance: Neat.  Speech: Normal rate, rhythm and tone.  Gait: Not observed, in w/c  Musculoskeletal: No abnormal movements-had pt remove mask and observed tongue.  No fasciculations or movement.  Mood/Affect: Full range  Thought Process: Normal rate, perseverative  Thought Content: No suicide or homicide ideation.  Associations: Intact, no delusions.  Perceptions: No hallucinations.  Memory: recent and remote memory intact, not formally tested  Attention span and concentration: normal, not formally tested  Language: Intact.  Fund of Knowledge: Normal.  Insight and Judgement: Adequate.         Total time spent on this encounter, on the date of service, including pre-visit review of separately obtained history, face-to-face interaction performing medically appropriate physical exam, patient counseling/education, interpretation of diagnostic results, care coordination and documentation was 54 minutes.        Breana Puente MD

## 2022-11-01 NOTE — PROGRESS NOTES
Reason for visit: increased depression this time of the year dt holiday's and an upcoming mother-in-law death anniversary.   Anything the provider should be aware of for today's appointment: Pt reports staying in bad all day yesterday    DISCUS: done 8/2/22 Score 0; Due again 2/2/2023 (Seroquel)   Patient verified allergies, medications and pharmacy .     Patient states they are ready for visit.    Lorraine Villegas November 1, 2022 10:40 AM

## 2022-11-02 ENCOUNTER — MEDICAL CORRESPONDENCE (OUTPATIENT)
Dept: HEALTH INFORMATION MANAGEMENT | Facility: CLINIC | Age: 62
End: 2022-11-02

## 2022-11-03 ENCOUNTER — MEDICAL CORRESPONDENCE (OUTPATIENT)
Dept: HEALTH INFORMATION MANAGEMENT | Facility: CLINIC | Age: 62
End: 2022-11-03

## 2022-11-04 ENCOUNTER — TELEPHONE (OUTPATIENT)
Dept: PSYCHIATRY | Facility: CLINIC | Age: 62
End: 2022-11-04

## 2022-11-08 DIAGNOSIS — Z53.9 DIAGNOSIS NOT YET DEFINED: Primary | ICD-10-CM

## 2022-11-08 PROCEDURE — G0179 MD RECERTIFICATION HHA PT: HCPCS | Performed by: PSYCHIATRY & NEUROLOGY

## 2022-11-09 ENCOUNTER — MEDICAL CORRESPONDENCE (OUTPATIENT)
Dept: HEALTH INFORMATION MANAGEMENT | Facility: CLINIC | Age: 62
End: 2022-11-09

## 2022-11-14 ENCOUNTER — TELEPHONE (OUTPATIENT)
Dept: PSYCHIATRY | Facility: CLINIC | Age: 62
End: 2022-11-14

## 2022-11-14 ENCOUNTER — ANTICOAGULATION THERAPY VISIT (OUTPATIENT)
Dept: ANTICOAGULATION | Facility: CLINIC | Age: 62
End: 2022-11-14

## 2022-11-14 DIAGNOSIS — I26.99 PULMONARY EMBOLISM WITH INFARCTION (H): ICD-10-CM

## 2022-11-14 DIAGNOSIS — Z86.711 HISTORY OF PULMONARY EMBOLISM: Primary | ICD-10-CM

## 2022-11-14 DIAGNOSIS — Z79.01 LONG TERM (CURRENT) USE OF ANTICOAGULANTS: ICD-10-CM

## 2022-11-14 LAB — INR (EXTERNAL): 2.3 (ref 0.9–1.1)

## 2022-11-14 NOTE — PROGRESS NOTES
ANTICOAGULATION MANAGEMENT     Valentina Olmedo 62 year old female is on warfarin with therapeutic INR result. (Goal INR 2.0-3.0)    Recent labs: (last 7 days)     11/14/22  1430   INR 2.3*       ASSESSMENT       Source(s): Chart Review and Home Care/Facility Nurse       Warfarin doses taken: Warfarin taken as instructed    Diet: No new diet changes identified    New illness, injury, or hospitalization: No    Medication/supplement changes: None noted    Signs or symptoms of bleeding or clotting: No    Previous INR: Subtherapeutic    Additional findings: Advised home care may be able to extend to two weeks if next INR result is in range       PLAN     Recommended plan for no diet, medication or health factor changes affecting INR     Dosing Instructions: Continue your current warfarin dose with next INR in 1 week       Summary  As of 11/14/2022    Full warfarin instructions:  10 mg every Mon, Fri; 12.5 mg all other days; Starting 11/14/2022   Next INR check:  11/21/2022             Telephone call with [facility] home care nurse who verbalizes understanding and agrees to plan    Orders given to  Homecare nurse/facility to recheck    Education provided:     Please call back if any changes to your diet, medications or how you've been taking warfarin    Plan made per [facility] anticoagulation protocol    Emerita Ca RN  Anticoagulation Clinic  11/14/2022    _______________________________________________________________________     Anticoagulation Episode Summary     Current INR goal:  2.0-3.0   TTR:  32.0 % (1 y)   Target end date:  Indefinite   Send INR reminders to:  [facility]    Indications    History of pulmonary embolism [Z86.711]  Pulmonary embolism with infarction (H) [I26.99]  Long term (current) use of anticoagulants [Z79.01]           Comments:           Anticoagulation Care Providers     Provider Role Specialty Phone number    Promise Vanegas MD Referring Family Medicine [phone]    Donald Rooney  MD SINDY Referring Internal Medicine [phone]    Jose Maria Morin MD Referring Family Medicine [phone]

## 2022-11-14 NOTE — TELEPHONE ENCOUNTER
Reason for call:  Other   Patient called regarding (reason for call): call back  Additional comments: Wants to know if Dr Puente to can write down order for her RN who comes to her home to do her INR- (they can do blood levels for her medications), can she get an order for Dr Puente to authorize this.  Need to know which medications need blood levels drawn & if Dr Puente would authorize for INR RN to do blood draw in the home    Phone number to reach patient:  Cell number on file:    Telephone Information:   Mobile [phone]       Best Time:  ANY    Can we leave a detailed message on this number?  YES    Travel screening: Not Applicable

## 2022-11-15 ENCOUNTER — OFFICE VISIT (OUTPATIENT)
Dept: INTERNAL MEDICINE | Facility: CLINIC | Age: 62
End: 2022-11-15
Payer: MEDICARE

## 2022-11-15 VITALS
SYSTOLIC BLOOD PRESSURE: 128 MMHG | HEART RATE: 96 BPM | DIASTOLIC BLOOD PRESSURE: 70 MMHG | WEIGHT: 293 LBS | BODY MASS INDEX: 52.62 KG/M2 | TEMPERATURE: 98.4 F | OXYGEN SATURATION: 97 %

## 2022-11-15 DIAGNOSIS — F25.1 SCHIZOAFFECTIVE DISORDER, DEPRESSIVE TYPE (H): ICD-10-CM

## 2022-11-15 DIAGNOSIS — E03.9 ACQUIRED HYPOTHYROIDISM: ICD-10-CM

## 2022-11-15 DIAGNOSIS — M81.8 OSTEOPOROSIS, IDIOPATHIC: Primary | ICD-10-CM

## 2022-11-15 DIAGNOSIS — Z72.0 TOBACCO ABUSE: ICD-10-CM

## 2022-11-15 DIAGNOSIS — I26.99 PULMONARY EMBOLISM WITH INFARCTION (H): ICD-10-CM

## 2022-11-15 DIAGNOSIS — K21.00 GASTROESOPHAGEAL REFLUX DISEASE WITH ESOPHAGITIS WITHOUT HEMORRHAGE: ICD-10-CM

## 2022-11-15 DIAGNOSIS — J44.9 CHRONIC OBSTRUCTIVE PULMONARY DISEASE, UNSPECIFIED COPD TYPE (H): ICD-10-CM

## 2022-11-15 PROBLEM — R53.81 PHYSICAL DECONDITIONING: Status: RESOLVED | Noted: 2022-03-16 | Resolved: 2022-11-15

## 2022-11-15 PROBLEM — N30.01 ACUTE CYSTITIS WITH HEMATURIA: Status: RESOLVED | Noted: 2022-04-07 | Resolved: 2022-11-15

## 2022-11-15 PROBLEM — M94.0 COSTOCHONDRITIS: Status: RESOLVED | Noted: 2021-11-10 | Resolved: 2022-11-15

## 2022-11-15 PROBLEM — H61.23 BILATERAL IMPACTED CERUMEN: Status: RESOLVED | Noted: 2022-04-07 | Resolved: 2022-11-15

## 2022-11-15 PROBLEM — Z86.711 HISTORY OF PULMONARY EMBOLISM: Status: RESOLVED | Noted: 2017-04-02 | Resolved: 2022-11-15

## 2022-11-15 PROCEDURE — 99214 OFFICE O/P EST MOD 30 MIN: CPT | Mod: 25 | Performed by: INTERNAL MEDICINE

## 2022-11-15 PROCEDURE — 96372 THER/PROPH/DIAG INJ SC/IM: CPT | Performed by: INTERNAL MEDICINE

## 2022-11-15 RX ORDER — ESOMEPRAZOLE MAGNESIUM 40 MG/1
40 CAPSULE, DELAYED RELEASE ORAL
Qty: 90 CAPSULE | Refills: 3 | Status: SHIPPED | OUTPATIENT
Start: 2022-11-15 | End: 2023-10-09

## 2022-11-15 RX ORDER — VIBEGRON 75 MG/1
75 TABLET, FILM COATED ORAL DAILY
Status: ON HOLD | COMMUNITY
End: 2024-05-07

## 2022-11-15 ASSESSMENT — PATIENT HEALTH QUESTIONNAIRE - PHQ9
SUM OF ALL RESPONSES TO PHQ QUESTIONS 1-9: 10
SUM OF ALL RESPONSES TO PHQ QUESTIONS 1-9: 10
10. IF YOU CHECKED OFF ANY PROBLEMS, HOW DIFFICULT HAVE THESE PROBLEMS MADE IT FOR YOU TO DO YOUR WORK, TAKE CARE OF THINGS AT HOME, OR GET ALONG WITH OTHER PEOPLE: NOT DIFFICULT AT ALL

## 2022-11-15 NOTE — PROGRESS NOTES
(M81.8) Osteoporosis, idiopathic  (primary encounter diagnosis)  Comment: History of fractures: L1, L2, L5 compression fracture since Jan 2020. She also repots h/o ribs fracture and ankle fracture.  FH: negative for osteoporosis. On warfarin for a year now for PE  Due for Prolia # 5, tolerating it well.   Due for DXA in 10/2023.  Plan: DX Hip/Pelvis/Spine            (I26.99) Pulmonary embolism with infarction (H)  Comment: On warfarin for 2 years now for PE      (F25.1) Schizoaffective disorder, depressive type (H)  Comment: long history of epilepsy and schizoaffective disorder - On carbamazepine and peganone now, but was on Topamax and valproate.       (E03.9) Acquired hypothyroidism  Comment: stable on levothyroxine  Component      Latest Ref Rng & Units 7/18/2022   TSH      0.30 - 4.20 uIU/mL 1.15       (K21.00) Gastroesophageal reflux disease with esophagitis without hemorrhage  Comment: stable on Nexium daily      (J44.9) Chronic obstructive pulmonary disease, unspecified COPD type (H)  Comment: stable on Serevent and albuterol prn. Not on oral on inhaled steroids      (Z72.0) Tobacco abuse  Comment: she is quitting, using nicotine patch and Chantix    Taking SSRI s is associated with lower bone mineral density in children and adults. In a study of adults over the age of 50, use of SSRIs not only lowered bone mineral density but they also increased the odds of falling and doubled the risk of osteoporosis fractures.    Excessive thyroid hormone causes osteoporosis and fractures. This can occur when hyperthyroid disease goes untreated or patient is taking too much levothyroxine. One study of women with osteoporosis concluded that taking a high dose of thyroid hormone (>150 micrograms/day) increased their fracture risk more than a 50%.     Steroids are the most common cause of drug-induced osteoporosis and fractures.The medications are such powerful bone destroyers that they cause bone loss and can lead to  fractures in 30-50% of people. Fracture risk increases after just three months. Even very small doses of oral glucocorticoids (< 2.5 mg/day over approximately 6 months) are associated with a 20% to 200% increase in risk of vertebral fractures. And for every 10-mg increase in dose there s a 62% increase in risk for bone fracture. Inhaled corticosteroids are effective for asthma treatment, but they can contribute to the development of osteoporosis in susceptible individuals when used in high doses.     Depot medroxyprogesterone acetate (DMPA) injections used for contraception can cause the bone loss and fractures. A study published in 2017 found that women under the age of 30 who had received 10 or more DMPA injections had 3 times the risk of fracture compared to women who had never received DMPA.     Cancer treatment-induced osteoporosis and fractures can happen with gonadotropin-releasing hormone (GnRH) agonists (used to treat endometriosis and prostate cancer) and aromatase inhibitor therapy that is used for breast cancer treatment.  While it seems bone-safe for posmenopausal women, the American College of Obstetricians and Gynecologists (ACOG) lists pre-menopausal use of tamoxifen an osteoporosis risk.    Acid blocking medications, such as Aciphex, Nexium, Prevacid, Protonix, Prilosec and Nexium are commonly used for heartburn. These medications can increase osteoporosis and fracture risk. A large study of more than 13,000 patients concluded that hip fracture risk increases by 22% after one year of taking the medication and by 54% after 3 years.     Antiseizure medications (particularly phenobarbital and phenytoin, carbamazepine) can cause bone density loss.    Regardless of normal or even high bone mineral density, patients with type 2 diabetes mellitus, have increased risk of fractures. One class of antidiabetic drugs, thiazolidinediones (pioglitazone, rosiglitazone), causes bone loss and further increases  facture risk, placing TZDs in the category of drugs causing secondary osteoporosis.     Warfarin inhibits the carboxylation of osteocalcin, reduces bone calcium deposition, inhibits bone mineralization, promotes bone resorption and causes osteoporosis. Heparin-induced osteoporosis occurs in the spine and ribs, which may be related to the increase in the dissolution of bone collagen, the promotion of bone resorption and the inhibition of bone formation.    In addition, quinolones, thalidomide, methotrexate, etc. can also cause drug-induced osteoporosis.         Patient was educated on safety of Prolia utilizing Patient Counseling Chart for Healthcare Providers, as outlined by the Prolia REMS progam.     Return in about 6 months (around 5/15/2023) for Prolia with CSS.    Patient Instructions   Prolia 5th today.  Prolia 6th in 6 months with my nurse. I will see you in 1 year.    DXA in 10/2023.   Phone number to schedule [phone].    Daily calcium need is 1369-0718 mg a day from the diet and supplements.  Calcium citrate is easier to digest.  Vitamin D 2000 IU daily recommended.    Risk of rebound vertebral fractures is higher when Prolia suddenly stopped or dose was missed.      Prolia and Covid vaccine should be  for at least a week.           /70 (BP Location: Right arm, Patient Position: Sitting, Cuff Size: Adult Large)   Pulse 96   Temp 98.4  F (36.9  C) (Oral)   Wt (!) 150.1 kg (331 lb)   LMP  (LMP Unknown)   SpO2 97%   BMI 52.62 kg/m        Did you experience any problems with previous Prolia injection? no  Any medication change in the last 6 months? no  Did you take prednisone or other immunosupressant drugs in the last 6 months   (chemo, transplant, rheum, dermatology conditions)? no  Did you have any serious infection in the last 6 months?no  Any recent hospitalizations?no  Do you plan any dental work in the next 2-3 months?no  How much calcium do you take daily from the diet and  supplements?1200 mg  How much vit D do you take daily? 2000 IU  Last DXA?10/2021  Reviewed and discussed      Patient is here today for the5 th Prolia injection. Patient tolerated previous injections well.   We discussed calcium and vit D daily needs today.   I reviewed the lab results:  Component      Latest Ref Rng & Units 8/18/2022   Sodium      136 - 145 mmol/L 141   Potassium      3.4 - 5.3 mmol/L 4.0   Creatinine      0.51 - 0.95 mg/dL 0.78   Urea Nitrogen      8.0 - 23.0 mg/dL 13.5   Chloride      98 - 107 mmol/L 105   Carbon Dioxide (CO2)      22 - 29 mmol/L 21 (L)   Anion Gap      7 - 15 mmol/L 15   Glucose      70 - 99 mg/dL 111 (H)   Calcium      8.8 - 10.2 mg/dL 9.0   Protein Total      6.4 - 8.3 g/dL 6.4   Albumin      3.5 - 5.2 g/dL 4.1   Bilirubin Total      <=1.2 mg/dL 0.2   Alkaline Phosphatase      35 - 104 U/L 77   AST      10 - 35 U/L 22   ALT      10 - 35 U/L 25   GFR Estimate      >60 mL/min/1.73m2 86       ROS: complete ROS negative.  Physical exam:  Constitutional:  oriented to person, place, and time, appears well-nourished. No distress.   HENT:   Head: Normocephalic.   Eyes: Conjunctivae are normal.   Neck: Normal range of motion.   Pulmonary/Chest: Effort normal  Musculoskeletal: Normal range of motion.   Neurological: alert and oriented to person, place, and time.   Psychiatric: normal mood and affect.    We discussed high risk of rebound vertebral fractures when Prolia suddenly stopped.    Next Prolia injection will be in 6 months.           This note has been dictated using voice recognition software. 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     Adrenal mass, left (H)     BMI  50.0-59.9, adult (H)     Nonintractable epilepsy without status epilepticus (H)     Esophageal reflux     Mixed hyperlipidemia     HLD (hyperlipidemia)     Hypothyroidism     Tobacco abuse     Chronic obstructive pulmonary disease (H)     Schizoaffective disorder, depressive type (H)     History of COVID-19     DNR (do not resuscitate)     Migraine headache     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)     Fracture of vertebra due to osteoporosis with routine healing, subsequent encounter     Major depressive disorder, recurrent episode, moderate (H)     Venous stasis     Peripheral polyneuropathy     Pulmonary embolism with infarction (H)     PTSD (post-traumatic stress disorder)     Long term (current) use of anticoagulants     History of melanoma       Current Outpatient Medications   Medication     albuterol (PROAIR HFA) 108 (90 Base) MCG/ACT inhaler     Brivaracetam (BRIVIACT) 100 MG tablet     Calcium Citrate-Vitamin D (CALCIUM + D PO)     carBAMazepine (TEGRETOL) 200 MG tablet     Cyanocobalamin (VITAMIN B12) 1000 MCG TBCR     DULoxetine (CYMBALTA) 60 MG capsule     EPINEPHrine (ADRENACLICK JR) 0.15 MG/0.15ML injection 2-pack     EPINEPHrine (ANY BX GENERIC EQUIV) 0.3 MG/0.3ML injection 2-pack     esomeprazole (NEXIUM) 40 MG DR capsule     fexofenadine (ALLEGRA) 180 MG tablet     fluticasone (FLONASE) 50 MCG/ACT nasal spray     hydrochlorothiazide (HYDRODIURIL) 25 MG tablet     levothyroxine (SYNTHROID/LEVOTHROID) 300 MCG tablet     multivitamin with iron (CHROMAGEN) TABS half-tab     nicotine (NICOTROL) 10 MG inhaler     Nutritional Supplements (NUTRITIONAL SHAKE HIGH PROTEIN) LIQD     olopatadine (PATADAY) 0.2 % ophthalmic solution     oxybutynin ER (DITROPAN XL) 10 MG 24 hr tablet     Polyethylene Glycol POWD     QUEtiapine 150 MG TABS     rosuvastatin (CRESTOR) 20 MG tablet     salmeterol (SEREVENT) 50 MCG/DOSE inhaler     SENNA-TIME 8.6 MG tablet     SUMAtriptan (IMITREX) 50 MG tablet     terbinafine (LAMISIL) 250  MG tablet     tiZANidine (ZANAFLEX) 4 MG tablet     topiramate (TOPAMAX) 50 MG tablet     varenicline (CHANTIX RA) 0.5 MG X 11 & 1 MG X 42 tablet     varenicline (CHANTIX) 1 MG tablet     Vibegron (GEMTESA) 75 MG TABS tablet     Vitamin D-Vitamin K (VITAMIN K2-VITAMIN D3)  MCG-UNIT CAPS     vitamin D3 (CHOLECALCIFEROL) 50 mcg (2000 units) tablet     warfarin ANTICOAGULANT (COUMADIN) 5 MG tablet     Current Facility-Administered Medications   Medication     denosumab (PROLIA) injection 60 mg

## 2022-11-15 NOTE — TELEPHONE ENCOUNTER
2nd attempt to reach patient. Unsuccessful     Left message for call back    Ludy Keita RN on 11/15/2022 at 3:15 PM

## 2022-11-15 NOTE — TELEPHONE ENCOUNTER
Breana Puente MD  You; [facility] Angel Stone; Jose Maria Morin MD 1 minute ago (3:11 PM)     TONO  RN to call pt. Tell her I do not order/monitor those labs. Orders need to come from:     1. INR- Dr. Jose Maria Wade's monitors these results.   2.  AED (antiepileptic drugs) levels- her neurologist monitors these results.     Thank you,   TONO

## 2022-11-15 NOTE — PATIENT INSTRUCTIONS
Prolia 5th today.  Prolia 6th in 6 months with my nurse. I will see you in 1 year.    DXA in 10/2023.   Phone number to schedule [phone].    Daily calcium need is 3485-2154 mg a day from the diet and supplements.  Calcium citrate is easier to digest.  Vitamin D 2000 IU daily recommended.    Risk of rebound vertebral fractures is higher when Prolia suddenly stopped or dose was missed.      Prolia and Covid vaccine should be  for at least a week.

## 2022-11-15 NOTE — TELEPHONE ENCOUNTER
"RN left message for patient to elaborate on what home care facility.     Appears Consent to communicate on file is invalid as there are no boxes \"checked\".     7/29/2020        "

## 2022-11-16 ENCOUNTER — MEDICAL CORRESPONDENCE (OUTPATIENT)
Dept: HEALTH INFORMATION MANAGEMENT | Facility: CLINIC | Age: 62
End: 2022-11-16

## 2022-11-16 NOTE — TELEPHONE ENCOUNTER
Spoke with patient. She states psychiatrist, Dr. Puente had requested labs to monitor levels of her anti-depressants/anti-anxiety medications.    Relayed message below from Dr. Puente. Advised patient to clarify with that provider regarding those requested labs. Patient verbalized understanding    Patient gave [facility] number - [phone]. LM with  to call back so verbal orders can be given for INR.

## 2022-11-16 NOTE — CONFIDENTIAL NOTE
I think they may be referring to me when they reference Dr. Ortiz.    My clinic should be monitoring INR results.  Can we call and give a verbal?  What do we need to do to send over these orders?

## 2022-11-17 ENCOUNTER — TELEPHONE (OUTPATIENT)
Dept: FAMILY MEDICINE | Facility: CLINIC | Age: 62
End: 2022-11-17

## 2022-11-17 NOTE — TELEPHONE ENCOUNTER
General Call    Reason for Call:  Request for PT/SLP orders    What are your questions or concerns:  Homecare RN calling to request verbal orders for PT/SLP evaluation for patient. Patient's [facility]  requested services.    If provider agrees, orders can be called to: [phone]

## 2022-11-21 ENCOUNTER — TELEPHONE (OUTPATIENT)
Dept: FAMILY MEDICINE | Facility: CLINIC | Age: 62
End: 2022-11-21

## 2022-11-21 ENCOUNTER — ANTICOAGULATION THERAPY VISIT (OUTPATIENT)
Dept: ANTICOAGULATION | Facility: CLINIC | Age: 62
End: 2022-11-21

## 2022-11-21 DIAGNOSIS — I26.99 PULMONARY EMBOLISM WITH INFARCTION (H): Primary | ICD-10-CM

## 2022-11-21 DIAGNOSIS — F17.210 CIGARETTE NICOTINE DEPENDENCE: ICD-10-CM

## 2022-11-21 DIAGNOSIS — Z79.01 LONG TERM (CURRENT) USE OF ANTICOAGULANTS: ICD-10-CM

## 2022-11-21 LAB — INR (EXTERNAL): 1.7 (ref 2–3)

## 2022-11-21 RX ORDER — VARENICLINE TARTRATE 1 MG/1
TABLET, FILM COATED ORAL
Qty: 56 TABLET | Refills: 0 | Status: SHIPPED | OUTPATIENT
Start: 2022-11-21 | End: 2022-12-19

## 2022-11-21 NOTE — TELEPHONE ENCOUNTER
Order/Referral Request    Who is requesting: [facility]rap[facility]    Orders being requested:   Physical Therapy 2x a week for 1 week, 1x a week for 3 weeks.    OT EVAL and Treat    Reason service is needed/diagnosis:   Low back pain, lower extremity strengthening    Activity Tolerance    When are orders needed by: ASAP    Has this been discussed with Provider: Yes    Does patient have a preference on a Group/Provider/Facility? [facility]    Does patient have an appointment scheduled?: No    Where to send orders: [facility]     Could we send this information to you in [facility] or would you prefer to receive a phone call?:  would prefer a phone call   Okay to leave a detailed message?: Yes at Other phone number:  [phone]

## 2022-11-21 NOTE — PROGRESS NOTES
ANTICOAGULATION MANAGEMENT     Valentina Olmedo 62 year old female is on warfarin with subtherapeutic INR result. (Goal INR 2.0-3.0)    Recent labs: (last 7 days)     11/21/22  1444   INR 1.7*       ASSESSMENT       Source(s): Chart Review and Patient/Caregiver Call       Warfarin doses taken: Missed dose(s) may be affecting INR    Diet: No new diet changes identified    New illness, injury, or hospitalization: No    Medication/supplement changes: None noted    Signs or symptoms of bleeding or clotting: No    Previous INR: Therapeutic last visit; previously outside of goal range    Additional findings: None       PLAN     Recommended plan for temporary change(s) affecting INR     Dosing Instructions: booster dose then continue your current warfarin dose with next INR in 1 week       Summary  As of 11/21/2022    Full warfarin instructions:  11/21: 12.5 mg; Otherwise 10 mg every Mon, Fri; 12.5 mg all other days; Starting 11/21/2022   Next INR check:  11/28/2022             Telephone call with Katelyn Aguirre  home care nurse who verbalizes understanding and agrees to plan and who agrees to plan and repeated back plan correctly    Orders given to  Homecare nurse/facility to recheck    Education provided:     Please call back if any changes to your diet, medications or how you've been taking warfarin    Plan made per [facility] anticoagulation protocol    Penny Del Castillo, RN  Anticoagulation Clinic  11/21/2022    _______________________________________________________________________     Anticoagulation Episode Summary     Current INR goal:  2.0-3.0   TTR:  32.9 % (1 y)   Target end date:  Indefinite   Send INR reminders to:  [facility]    Indications    History of pulmonary embolism (Resolved) [Z86.711]  Pulmonary embolism with infarction (H) [I26.99]  Long term (current) use of anticoagulants [Z79.01]           Comments:           Anticoagulation Care Providers     Provider Role Specialty Phone number    Promise Vanegas  MD Rochelle Referring Family Medicine [phone]    Donald Rooney MD Referring Internal Medicine [phone]    Jose Maria Morin MD Referring Family Medicine [phone]

## 2022-11-23 ENCOUNTER — MEDICAL CORRESPONDENCE (OUTPATIENT)
Dept: HEALTH INFORMATION MANAGEMENT | Facility: CLINIC | Age: 62
End: 2022-11-23

## 2022-11-25 ENCOUNTER — MEDICAL CORRESPONDENCE (OUTPATIENT)
Dept: HEALTH INFORMATION MANAGEMENT | Facility: CLINIC | Age: 62
End: 2022-11-25

## 2022-11-28 ENCOUNTER — ANTICOAGULATION THERAPY VISIT (OUTPATIENT)
Dept: ANTICOAGULATION | Facility: CLINIC | Age: 62
End: 2022-11-28

## 2022-11-28 ENCOUNTER — TELEPHONE (OUTPATIENT)
Dept: FAMILY MEDICINE | Facility: CLINIC | Age: 62
End: 2022-11-28

## 2022-11-28 DIAGNOSIS — I26.99 PULMONARY EMBOLISM WITH INFARCTION (H): Primary | ICD-10-CM

## 2022-11-28 DIAGNOSIS — Z79.01 LONG TERM (CURRENT) USE OF ANTICOAGULANTS: ICD-10-CM

## 2022-11-28 LAB — INR (EXTERNAL): 2.3 (ref 0.9–1.1)

## 2022-11-28 NOTE — PROGRESS NOTES
ANTICOAGULATION MANAGEMENT     Valentina Olmedo 62 year old female is on warfarin with therapeutic INR result. (Goal INR 2.0-3.0)    Recent labs: (last 7 days)     11/28/22  1215   INR 2.3*       ASSESSMENT       Source(s): Chart Review and Home Care/Facility Nurse       Warfarin doses taken: Warfarin taken as instructed    Diet: No new diet changes identified    New illness, injury, or hospitalization: No    Medication/supplement changes: None noted    Signs or symptoms of bleeding or clotting: No    Previous INR: Subtherapeutic    Additional findings: patient c/o dizziness today.  mainly with changing positions and walking       PLAN     Recommended plan for no diet, medication or health factor changes affecting INR     Dosing Instructions: Continue your current warfarin dose with next INR in 1 week       Summary  As of 11/28/2022    Full warfarin instructions:  10 mg every Mon, Fri; 12.5 mg all other days; Starting 11/28/2022   Next INR check:  12/5/2022             Telephone call with Katelyn home care nurse who verbalizes understanding and agrees to plan    Orders given to  Homecare nurse/facility to recheck    Education provided:     Please call back if any changes to your diet, medications or how you've been taking warfarin    Plan made per [facility] anticoagulation protocol    Emerita Ca RN  Anticoagulation Clinic  11/28/2022    _______________________________________________________________________     Anticoagulation Episode Summary     Current INR goal:  2.0-3.0   TTR:  33.9 % (1 y)   Target end date:  Indefinite   Send INR reminders to:  [facility]    Indications    History of pulmonary embolism (Resolved) [Z86.711]  Pulmonary embolism with infarction (H) [I26.99]  Long term (current) use of anticoagulants [Z79.01]           Comments:           Anticoagulation Care Providers     Provider Role Specialty Phone number    Promise Vanegas MD Referring Family Medicine [phone]    Donald Rooney  MD SINDY Referring Internal Medicine [phone]    Jose Maria Morin MD Referring Family Medicine [phone]

## 2022-11-28 NOTE — TELEPHONE ENCOUNTER
[facility] calling for verbal orders of speech therapy. 1x wk for 6 weeks for cognitive training.    Last OV 10/28/22. Verbal orders given. Please advise if do not agree

## 2022-11-29 ENCOUNTER — TRANSFERRED RECORDS (OUTPATIENT)
Dept: HEALTH INFORMATION MANAGEMENT | Facility: CLINIC | Age: 62
End: 2022-11-29

## 2022-11-29 ENCOUNTER — MEDICAL CORRESPONDENCE (OUTPATIENT)
Dept: HEALTH INFORMATION MANAGEMENT | Facility: CLINIC | Age: 62
End: 2022-11-29

## 2022-11-30 ENCOUNTER — MEDICAL CORRESPONDENCE (OUTPATIENT)
Dept: HEALTH INFORMATION MANAGEMENT | Facility: CLINIC | Age: 62
End: 2022-11-30

## 2022-12-01 ENCOUNTER — MEDICAL CORRESPONDENCE (OUTPATIENT)
Dept: HEALTH INFORMATION MANAGEMENT | Facility: CLINIC | Age: 62
End: 2022-12-01

## 2022-12-02 ENCOUNTER — MEDICAL CORRESPONDENCE (OUTPATIENT)
Dept: HEALTH INFORMATION MANAGEMENT | Facility: CLINIC | Age: 62
End: 2022-12-02

## 2022-12-04 ENCOUNTER — HEALTH MAINTENANCE LETTER (OUTPATIENT)
Age: 62
End: 2022-12-04

## 2022-12-05 ENCOUNTER — ANTICOAGULATION THERAPY VISIT (OUTPATIENT)
Dept: ANTICOAGULATION | Facility: CLINIC | Age: 62
End: 2022-12-05

## 2022-12-05 DIAGNOSIS — Z79.01 LONG TERM (CURRENT) USE OF ANTICOAGULANTS: ICD-10-CM

## 2022-12-05 DIAGNOSIS — I26.99 PULMONARY EMBOLISM WITH INFARCTION (H): Primary | ICD-10-CM

## 2022-12-05 LAB — INR (EXTERNAL): 2.4 (ref 0.9–1.1)

## 2022-12-05 NOTE — PROGRESS NOTES
ANTICOAGULATION MANAGEMENT     Valentina Olmedo 62 year old female is on warfarin with therapeutic INR result. (Goal INR 2-3)    Recent labs: (last 7 days)     12/05/22  1241   INR 2.4*       ASSESSMENT       Source(s): Chart Review and Home Care/Facility Nurse       Warfarin doses taken: Warfarin taken as instructed    Diet: No new diet changes identified    New illness, injury, or hospitalization: No    Medication/supplement changes: None noted    Signs or symptoms of bleeding or clotting: No    Previous INR: Therapeutic last visit; previously outside of goal range    Additional findings: None       PLAN     Recommended plan for no diet, medication or health factor changes affecting INR     Dosing Instructions: Continue your current warfarin dose with next INR in 2 weeks       Summary  As of 12/5/2022    Full warfarin instructions:  10 mg every Mon, Fri; 12.5 mg all other days; Starting 12/5/2022   Next INR check:  12/19/2022             Telephone call with Katelyn home care nurse who verbalizes understanding and agrees to plan    Orders given to  Homecare nurse/facility to recheck    Education provided:     Goal range and lab monitoring: goal range and significance of current result    Plan made per [facility] anticoagulation protocol    Arden Shipman RN  Anticoagulation Clinic  12/5/2022    _______________________________________________________________________     Anticoagulation Episode Summary     Current INR goal:  2.0-3.0   TTR:  35.8 % (1 y)   Target end date:  Indefinite   Send INR reminders to:  [facility]    Indications    History of pulmonary embolism (Resolved) [Z86.711]  Pulmonary embolism with infarction (H) [I26.99]  Long term (current) use of anticoagulants [Z79.01]           Comments:           Anticoagulation Care Providers     Provider Role Specialty Phone number    Promise Vanegas MD Referring Family Medicine [phone]    Donald Rooney MD Referring Internal Medicine [phone]      Jose Maria Muhammad MD [facility] Medicine [phone]             [Negative] : Heme/Lymph

## 2022-12-06 ENCOUNTER — MEDICAL CORRESPONDENCE (OUTPATIENT)
Dept: HEALTH INFORMATION MANAGEMENT | Facility: CLINIC | Age: 62
End: 2022-12-06

## 2022-12-08 ENCOUNTER — TELEPHONE (OUTPATIENT)
Dept: FAMILY MEDICINE | Facility: CLINIC | Age: 62
End: 2022-12-08

## 2022-12-08 NOTE — TELEPHONE ENCOUNTER
The Home Care/Assisted Living/Nursing Facility is calling regarding an established patient.  Has the patient seen Home Care in the past or is currently residing in Assisted Living or Nursing Facility? Yes.     Roselyn calling from [facility] requesting the following orders that are within the Home Care, Assisted Living or Nursing Home Eval and Treatment standing order and can be signed as standing order signature required by RN.    Preferred Call Back Number: [phone]    Home Care Visits Continuation    Any additional Orders:  Are there any orders requested, not stated above, that are outside of the standing order and must be routed to a licensed practitioner for approval?    No    Writer has verified Requestor will send fax to have orders signed.

## 2022-12-08 NOTE — TELEPHONE ENCOUNTER
The Home Care/Assisted Living/Nursing Facility is calling regarding an established patient.  Has the patient seen Home Care in the past or is currently residing in Assisted Living or Nursing Facility? Yes.     Ana calling from [facility] requesting the following orders that are within the Home Care, Assisted Living or Nursing Home Eval and Treatment standing order and can be signed as standing order signature required by RN.    Preferred Call Back Number: [phone]    PT/OT/Speech Therapy    Any additional Orders:  Are there any orders requested, not stated above, that are outside of the standing order and must be routed to a licensed practitioner for approval?    No    Writer has verified Requestor will send fax to have orders signed.

## 2022-12-08 NOTE — TELEPHONE ENCOUNTER
General Call    Contacts       Type Contact Phone/Fax    12/08/2022 10:42 AM CST Phone (Incoming) Roselyn (Home Care) [phone]     [facility]        Reason for Call:  Home Care Verbal Orders    What are your questions or concerns:  Roselyn from [facility] requesting recertification of verbal orders:    RN: Continued homecare RN visits weekly for medication management    Phone Number of Homecare RN: [phone] (message can be left, secure voicemail)    Date of last appointment with provider: 10/28/2022

## 2022-12-08 NOTE — TELEPHONE ENCOUNTER
Order/Referral Request    Who is requesting: ANA SHELL     Orders being requested:   1 additional O.T. visit for next week to complete Patient discharge.    Reason service is needed/diagnosis: Patient cancelled on O.T. discharge appointment authorization ended.    When are orders needed by: ASAP    Has this been discussed with Provider: Yes    Does patient have a preference on a Group/Provider/Facility? [facility]    Does patient have an appointment scheduled?: No    Where to send orders: [facility]    Could we send this information to you in [facility] or would you prefer to receive a phone call?:   Patient would prefer a phone call     Okay to leave a detailed message?: Yes at Other phone number:  [phone]  Ana SHELL

## 2022-12-12 ENCOUNTER — MEDICAL CORRESPONDENCE (OUTPATIENT)
Dept: HEALTH INFORMATION MANAGEMENT | Facility: CLINIC | Age: 62
End: 2022-12-12

## 2022-12-12 DIAGNOSIS — Z79.01 LONG TERM (CURRENT) USE OF ANTICOAGULANTS: ICD-10-CM

## 2022-12-12 DIAGNOSIS — Z86.711 HISTORY OF PULMONARY EMBOLISM: ICD-10-CM

## 2022-12-12 RX ORDER — WARFARIN SODIUM 5 MG/1
TABLET ORAL
Qty: 212 TABLET | Refills: 1 | Status: SHIPPED | OUTPATIENT
Start: 2022-12-12 | End: 2023-05-11

## 2022-12-12 NOTE — TELEPHONE ENCOUNTER
ANTICOAGULATION MANAGEMENT:  Medication Refill    Anticoagulation Summary  As of 12/5/2022    Warfarin maintenance plan:  10 mg (5 mg x 2) every Mon, Fri; 12.5 mg (5 mg x 2.5) all other days; Starting 12/5/2022   Next INR check:  12/19/2022   Target end date:  Indefinite    Indications    History of pulmonary embolism (Resolved) [Z86.711]  Pulmonary embolism with infarction (H) [I26.99]  Long term (current) use of anticoagulants [Z79.01]             Anticoagulation Care Providers     Provider Role Specialty Phone number    Promise Vanegas MD Referring Family Medicine [phone]    Donald Rooney MD Referring Internal Medicine [phone]    Jose Maria Morin MD Referring Family Medicine [phone]          Visit with referring provider/group within last year: Yes    [facility] referral signed within last year: Yes    Valentina meets all criteria for refill (current [facility] referral, office visit with referring provider/group in last year, lab monitoring up to date or not exceeding 2 weeks overdue). Rx instructions and quantity supplied updated to match patient's current dosing plan. Warfarin 90 day supply with 1 refill granted per [facility] protocol     Arden Shipman RN  Anticoagulation Clinic

## 2022-12-14 ENCOUNTER — MEDICAL CORRESPONDENCE (OUTPATIENT)
Dept: HEALTH INFORMATION MANAGEMENT | Facility: CLINIC | Age: 62
End: 2022-12-14

## 2022-12-14 ENCOUNTER — HOSPITAL ENCOUNTER (OUTPATIENT)
Dept: CARDIAC REHAB | Facility: HOSPITAL | Age: 62
Discharge: HOME | End: 2022-12-14
Attending: INTERNAL MEDICINE
Payer: MEDICARE

## 2022-12-14 ENCOUNTER — TELEPHONE (OUTPATIENT)
Dept: FAMILY MEDICINE | Facility: CLINIC | Age: 62
End: 2022-12-14

## 2022-12-14 ENCOUNTER — OFFICE VISIT (OUTPATIENT)
Dept: FAMILY MEDICINE | Facility: CLINIC | Age: 62
End: 2022-12-14
Payer: MEDICARE

## 2022-12-14 VITALS — OXYGEN SATURATION: 97 % | SYSTOLIC BLOOD PRESSURE: 110 MMHG | HEART RATE: 89 BPM | DIASTOLIC BLOOD PRESSURE: 78 MMHG

## 2022-12-14 DIAGNOSIS — R53.81 PHYSICAL DECONDITIONING: ICD-10-CM

## 2022-12-14 DIAGNOSIS — R42 VERTIGO: Primary | ICD-10-CM

## 2022-12-14 DIAGNOSIS — E66.01 MORBID OBESITY (H): ICD-10-CM

## 2022-12-14 PROCEDURE — 99213 OFFICE O/P EST LOW 20 MIN: CPT | Performed by: FAMILY MEDICINE

## 2022-12-14 PROCEDURE — G0238 OTH RESP PROC, INDIV: HCPCS

## 2022-12-14 RX ORDER — MECLIZINE HYDROCHLORIDE 25 MG/1
TABLET ORAL
Qty: 30 TABLET | Refills: 1 | Status: SHIPPED | OUTPATIENT
Start: 2022-12-14 | End: 2022-12-29

## 2022-12-14 ASSESSMENT — ENCOUNTER SYMPTOMS: DIZZINESS: 1

## 2022-12-14 NOTE — TELEPHONE ENCOUNTER
"----- Message from FLACO Cordova sent at 12/14/2022  3:32 PM CST -----  Regarding: FYI PHQ-9  Valentina presented for her initial Pulmonary Rehab evaluation today and scored a 20 on her PHQ-9 with a 3/3 on last question.  Offered to take her to ED for further evaluation, she declined and stated that she would go to ED if she felt like she was going to do \"something\".  Her , Jass, was present during conversation.      Thanks,  Hamida Mckeon BS/EP CCRP, CRCE  Pulmonary Rehab Therapist    "

## 2022-12-14 NOTE — PROGRESS NOTES
Problem List Items Addressed This Visit    None  Visit Diagnoses     Vertigo    -  Primary    Relevant Medications    meclizine (ANTIVERT) 25 MG tablet    Other Relevant Orders    Adult ENT  Referral    Physical Therapy Referral        Valentina is a 62-year-old female presenting today with symptoms of vestibular dysfunction.  On exam she has left beating nystagmus suggesting it involves her left inner ear.  I prescribed some meclizine to take as needed for symptom relief.  However, because she is very medically complex and the symptoms have been present for so long, I would like an ENT consultation.  I also placed a referral for PT/OT to do some therapy to try to relieve her symptoms.  Because of the positional nature, benign paroxysmal positional vertigo may very well be the diagnosis and then PT should help.  The patient was comfortable with this plan.    Subjective   Valentina is a 62 year old who presents today with a chief complaint of dizziness.  The patient reports onset of symptoms over a month ago and states that the symptoms are not improving.  She states that the symptoms come on whenever she tries to move whether it is moving her head or her whole body.  She describes feeling off balance when she tries to move and is concerned about falling.  Her symptoms definitely get worse when she moves her head.  She has had vertigo in the past, but states that it feels somewhat different to that.  She also describes some decrease in hearing.  The patient is not aware of any new medication that she has been prescribed prior to the symptoms starting.  She has been using her walker around the house more in order to reduce risk of falling.  She hoped it would simply go away, but since it is now been there over a month she felt it needed further evaluation.      Chief Complaint   Patient presents with     Dizziness      Dizziness    History of Present Illness       Reason for visit:  To get a referal for a ent or  something i am going through dizziness  Symptom onset:  3-4 weeks ago  Symptoms include:  Dizziness  Symptom intensity:  Moderate  Symptom progression:  Staying the same  Had these symptoms before:  Yes  Has tried/received treatment for these symptoms:  No  What makes it worse:  Unknown  What makes it better:  Laying down    She eats 0-1 servings of fruits and vegetables daily.She consumes 0 sweetened beverage(s) daily.She exercises with enough effort to increase her heart rate 9 or less minutes per day.  She exercises with enough effort to increase her heart rate 3 or less days per week.   She is taking medications regularly.       Review of Systems   Neurological: Positive for dizziness.            Objective    /78 (BP Location: Left arm, Patient Position: Left side, Cuff Size: Adult Large)   Pulse 89   LMP  (LMP Unknown)   SpO2 97%   There is no height or weight on file to calculate BMI.  Physical Exam   GENERAL: healthy, alert and no distress  EYES: full extraocular movement, but with nystagmus to the left.  HENT: ear canals and TM's normal, nose and mouth without ulcers or lesions      This note has been dictated using voice recognition software. Any grammatical or context distortions are unintentional and inherent to the software

## 2022-12-14 NOTE — CONFIDENTIAL NOTE
Respiratory therapy protocols has them administering a PHQ-9.  Patient scored a 20 which is higher than her most recent measurement.  Denies suicidal ideation.    PHQ-9 has been elevated in the past.  She has a psychiatrist and has an appointment on 12/20.  We will have clinic call and offer her clinic appointment to talk about mood.      PHQ 8/12/2022 10/28/2022 11/15/2022   PHQ-9 Total Score 17 7 10   Q9: Thoughts of better off dead/self-harm past 2 weeks More than half the days Several days Several days   F/U: Thoughts of suicide or self-harm - No No   F/U: Safety concerns - No No

## 2022-12-16 ENCOUNTER — ANTICOAGULATION THERAPY VISIT (OUTPATIENT)
Dept: ANTICOAGULATION | Facility: CLINIC | Age: 62
End: 2022-12-16

## 2022-12-16 ENCOUNTER — MEDICAL CORRESPONDENCE (OUTPATIENT)
Dept: HEALTH INFORMATION MANAGEMENT | Facility: CLINIC | Age: 62
End: 2022-12-16

## 2022-12-16 DIAGNOSIS — Z79.01 LONG TERM (CURRENT) USE OF ANTICOAGULANTS: ICD-10-CM

## 2022-12-16 DIAGNOSIS — I26.99 PULMONARY EMBOLISM WITH INFARCTION (H): Primary | ICD-10-CM

## 2022-12-16 NOTE — PROGRESS NOTES
[facility] reports patient is having epidural on 12/22/22. She will be holding warfarin 12/17-12/21/22 per home care [facility]  She will recheck 12/27/22.    Kyleigh Jimenes RN

## 2022-12-20 ENCOUNTER — MEDICAL CORRESPONDENCE (OUTPATIENT)
Dept: HEALTH INFORMATION MANAGEMENT | Facility: CLINIC | Age: 62
End: 2022-12-20

## 2022-12-21 ENCOUNTER — HOSPITAL ENCOUNTER (OUTPATIENT)
Dept: CARDIAC REHAB | Facility: HOSPITAL | Age: 62
Discharge: HOME | End: 2022-12-21
Attending: INTERNAL MEDICINE
Payer: MEDICARE

## 2022-12-21 DIAGNOSIS — J44.9 CHRONIC OBSTRUCTIVE PULMONARY DISEASE, UNSPECIFIED COPD TYPE (H): Primary | ICD-10-CM

## 2022-12-21 PROCEDURE — G0238 OTH RESP PROC, INDIV: HCPCS

## 2022-12-21 NOTE — TELEPHONE ENCOUNTER
Medication Request  Medication name: salmeterol (SEREVENT) 50 MCG/DOSE inhaler  Patient Sig: Inhale 1 puff into the lungs 2 times daily  Requested Pharmacy: [facility]  When was patient last seen?:  12/14/22  Patient offered appointment:  No  Okay to leave a detailed message: no      Unable to prep refill, pharmacy requested is [facility].

## 2022-12-22 NOTE — TELEPHONE ENCOUNTER
"Routing refill request to provider for review/approval because:  Order for serevent, striverdi, or foradil and patient has steroid inhaler    Last Written Prescription Date: 12/9/21  Last Fill Quantity: 180, # refills: 4  Last office visit provider: 12/14/22    Requested Prescriptions   Pending Prescriptions Disp Refills     salmeterol (SEREVENT) 50 MCG/ACT inhaler       Sig: Inhale 1 puff into the lungs 2 times daily       Long-Acting Beta Agonist Inhalers Protocol  Failed - 12/21/2022  2:19 PM        Failed - Order for Serevent, Striverdi, or Foradil and pt has steroid inhaler        Passed - Patient is age 12 or older        Passed - Recent (12 mo) or future (30 days) visit within the authorizing provider's specialty     Patient has had an office visit with the authorizing provider or a provider within the authorizing providers department within the previous 12 mos or has a future within next 30 days. See \"Patient Info\" tab in inbasket, or \"Choose Columns\" in Meds & Orders section of the refill encounter.              Passed - Medication is active on med list             Duy Jeffrey RN 12/22/22 3:20 PM    "

## 2022-12-27 ENCOUNTER — HOSPITAL ENCOUNTER (OUTPATIENT)
Dept: CARDIAC REHAB | Facility: HOSPITAL | Age: 62
Discharge: HOME | End: 2022-12-27
Attending: INTERNAL MEDICINE
Payer: MEDICARE

## 2022-12-27 ENCOUNTER — ANTICOAGULATION THERAPY VISIT (OUTPATIENT)
Dept: ANTICOAGULATION | Facility: CLINIC | Age: 62
End: 2022-12-27

## 2022-12-27 DIAGNOSIS — I26.99 PULMONARY EMBOLISM WITH INFARCTION (H): Primary | ICD-10-CM

## 2022-12-27 DIAGNOSIS — Z79.01 LONG TERM (CURRENT) USE OF ANTICOAGULANTS: ICD-10-CM

## 2022-12-27 LAB — INR (EXTERNAL): 1.4 (ref 0.9–1.1)

## 2022-12-27 PROCEDURE — G0239 OTH RESP PROC, GROUP: HCPCS

## 2022-12-27 NOTE — PROGRESS NOTES
ANTICOAGULATION MANAGEMENT     Valentina Olmedo 62 year old female is on warfarin with subtherapeutic INR result. (Goal INR 2.0-3.0)    Recent labs: (last 7 days)     12/27/22  1447   INR 1.4*       ASSESSMENT       Source(s): Chart Review       Warfarin doses taken: Warfarin was recently held for 5 days for epidural injection on 12/22.  Patient did restart her warfarin that night.    Diet: No new diet changes identified    New illness, injury, or hospitalization: No    Medication/supplement changes: None noted    Signs or symptoms of bleeding or clotting: No    Previous INR: Therapeutic last 2(+) visits    Additional findings: Patient stopped smoking 1 week ago.  If he is able to continue this, there may be an increase in INR.       PLAN     Recommended plan for temporary change(s) affecting INR     Dosing Instructions: booster dose then continue your current warfarin dose with next INR in 1 week       Summary  As of 12/27/2022    Full warfarin instructions:  12/27: 15 mg; Otherwise 10 mg every Mon, Fri; 12.5 mg all other days   Next INR check:  1/3/2023             Telephone call with [facility] care nurse who verbalizes understanding and agrees to plan    Orders given to  Homecare nurse/facility to recheck    Education provided:     Goal range and lab monitoring: goal range and significance of current result    Plan made per [facility] anticoagulation protocol    Arden Shipman RN  Anticoagulation Clinic  12/27/2022    _______________________________________________________________________     Anticoagulation Episode Summary     Current INR goal:  2.0-3.0   TTR:  37.5 % (1 y)   Target end date:  Indefinite   Send INR reminders to:  [facility]    Indications    History of pulmonary embolism (Resolved) [Z86.711]  Pulmonary embolism with infarction (H) [I26.99]  Long term (current) use of anticoagulants [Z79.01]           Comments:           Anticoagulation Care Providers     Provider Role Specialty Phone number     Promise Vanegas MD Referring Family Medicine [phone]    Donald Rooney MD Referring Internal Medicine [phone]    Jose Maria Morin MD Referring Family Medicine [phone]

## 2022-12-28 ENCOUNTER — MEDICAL CORRESPONDENCE (OUTPATIENT)
Dept: HEALTH INFORMATION MANAGEMENT | Facility: CLINIC | Age: 62
End: 2022-12-28

## 2022-12-29 ENCOUNTER — TELEPHONE (OUTPATIENT)
Dept: PSYCHIATRY | Facility: CLINIC | Age: 62
End: 2022-12-29

## 2022-12-29 DIAGNOSIS — R42 VERTIGO: ICD-10-CM

## 2022-12-29 RX ORDER — MECLIZINE HYDROCHLORIDE 25 MG/1
TABLET ORAL
Qty: 30 TABLET | Refills: 1 | Status: SHIPPED | OUTPATIENT
Start: 2022-12-29 | End: 2023-01-27

## 2022-12-29 NOTE — TELEPHONE ENCOUNTER
Please review, sign, date Physician Order. After complete form, fax back to [phone].    Original in provider mail drawer.

## 2022-12-30 ENCOUNTER — TELEPHONE (OUTPATIENT)
Dept: FAMILY MEDICINE | Facility: CLINIC | Age: 62
End: 2022-12-30

## 2022-12-30 NOTE — TELEPHONE ENCOUNTER
FYI - Status Update    Who is Calling: Adiel with [facility]    Update:   Client had a fall in home today, pain is a 3/10, resulting in no major injuries.    Requesting OT Eval for Home modification and Safety assessment   Due to frequent falls.    Does caller want a call/response back: Yes, requesting *Verbal* Orders.    Okay to leave a detailed message?: Yes at Other phone number:     [phone]

## 2022-12-30 NOTE — TELEPHONE ENCOUNTER
"Last Written Prescription Date:  12/14/22  Last Fill Quantity: 30,  # refills: 1  Last office visit provider:  12/14/22     Requested Prescriptions   Pending Prescriptions Disp Refills     meclizine (ANTIVERT) 25 MG tablet [Pharmacy Med Name: Meclizine HCl 25MG TABS] 30 tablet 1     Sig: TAKE 1/2-1 TABLET BY MOUTH BY MOUTH THREE TIMES A DAY AS NEEDED FOR DIZZINESS        Antivertigo/Antiemetic Agents Passed - 12/29/2022  9:18 AM        Passed - Recent (12 mo) or future (30 days) visit within the authorizing provider's specialty     Patient has had an office visit with the authorizing provider or a provider within the authorizing providers department within the previous 12 mos or has a future within next 30 days. See \"Patient Info\" tab in inbasket, or \"Choose Columns\" in Meds & Orders section of the refill encounter.              Passed - Medication is active on med list        Passed - Patient is 18 years of age or older             Pema Olivares RN 12/29/22 7:48 PM  "

## 2022-12-31 NOTE — CONFIDENTIAL NOTE
Notes reviewed.  It sounds like she has had some dizziness over this past month and saw Dr. Rocha on 12/14.  Agree with occupational therapy referral.  Please give verbal.

## 2023-01-03 ENCOUNTER — ANTICOAGULATION THERAPY VISIT (OUTPATIENT)
Dept: ANTICOAGULATION | Facility: CLINIC | Age: 63
End: 2023-01-03

## 2023-01-03 ENCOUNTER — MEDICAL CORRESPONDENCE (OUTPATIENT)
Dept: HEALTH INFORMATION MANAGEMENT | Facility: CLINIC | Age: 63
End: 2023-01-03

## 2023-01-03 DIAGNOSIS — Z79.01 LONG TERM (CURRENT) USE OF ANTICOAGULANTS: ICD-10-CM

## 2023-01-03 DIAGNOSIS — I26.99 PULMONARY EMBOLISM WITH INFARCTION (H): Primary | ICD-10-CM

## 2023-01-03 LAB — INR (EXTERNAL): 1.7 (ref 0.9–1.1)

## 2023-01-03 NOTE — PROGRESS NOTES
[facility]  [phone], calling in INR.     INR - 1.7     No changes, concerns.     Tosin Singh RN

## 2023-01-03 NOTE — PROGRESS NOTES
ANTICOAGULATION MANAGEMENT     Valentina Olmedo 62 year old female is on warfarin with subtherapeutic INR result. (Goal INR 2.0-3.0)    Recent labs: (last 7 days)     01/03/23  0000   INR 1.7*       ASSESSMENT       Source(s): Chart Review and Home Care/Facility Nurse       Warfarin doses taken: Held for Epidural injection   recently which may be affecting INR    Diet: No new diet changes identified    New illness, injury, or hospitalization: No    Medication/supplement changes: None noted    Signs or symptoms of bleeding or clotting: No    Previous INR: Subtherapeutic    Additional findings: None       PLAN     Recommended plan for temporary change(s) affecting INR     Dosing Instructions: booster dose then continue your current warfarin dose with next INR in 1 week       Summary  As of 1/3/2023    Full warfarin instructions:  1/3: 15 mg; Otherwise 10 mg every Mon, Fri; 12.5 mg all other days   Next INR check:  1/10/2023             Telephone call with [facility] home care nurse who agrees to plan and repeated back plan correctly    Orders given to  Homecare nurse/facility to recheck    Education provided:     Please call back if any changes to your diet, medications or how you've been taking warfarin    Plan made per [facility] anticoagulation protocol    Melissa Montenegro, RN  Anticoagulation Clinic  1/3/2023    _______________________________________________________________________     Anticoagulation Episode Summary     Current INR goal:  2.0-3.0   TTR:  37.5 % (1 y)   Target end date:  Indefinite   Send INR reminders to:  [facility]    Indications    History of pulmonary embolism (Resolved) [Z86.711]  Pulmonary embolism with infarction (H) [I26.99]  Long term (current) use of anticoagulants [Z79.01]           Comments:           Anticoagulation Care Providers     Provider Role Specialty Phone number    Promise Vanegas MD Referring Family Medicine [phone]    Donald Rooney MD Referring Internal Medicine  [phone]    Jose Maria Morin MD [facility] Family Medicine [phone]

## 2023-01-04 NOTE — TELEPHONE ENCOUNTER
This order is for Warfarin which Dr. Puente does not prescribe. This needs to be routed to the anticoagulation clinic or PCP. Forwarding to [facility]    Coco Rogers RN on 1/4/2023 at 10:18 AM

## 2023-01-04 NOTE — TELEPHONE ENCOUNTER
Dr. Puente faxed to [facility] and [facility] to fax again.    Coco Rogers RN on 1/4/2023 at 11:15 AM

## 2023-01-05 ENCOUNTER — HOSPITAL ENCOUNTER (OUTPATIENT)
Dept: CARDIAC REHAB | Facility: HOSPITAL | Age: 63
Discharge: HOME | End: 2023-01-05
Attending: INTERNAL MEDICINE
Payer: COMMERCIAL

## 2023-01-05 PROCEDURE — G0239 OTH RESP PROC, GROUP: HCPCS

## 2023-01-09 ENCOUNTER — MEDICAL CORRESPONDENCE (OUTPATIENT)
Dept: HEALTH INFORMATION MANAGEMENT | Facility: CLINIC | Age: 63
End: 2023-01-09

## 2023-01-10 ENCOUNTER — MEDICAL CORRESPONDENCE (OUTPATIENT)
Dept: HEALTH INFORMATION MANAGEMENT | Facility: CLINIC | Age: 63
End: 2023-01-10

## 2023-01-10 ENCOUNTER — TELEPHONE (OUTPATIENT)
Dept: FAMILY MEDICINE | Facility: CLINIC | Age: 63
End: 2023-01-10

## 2023-01-10 ENCOUNTER — ANTICOAGULATION THERAPY VISIT (OUTPATIENT)
Dept: ANTICOAGULATION | Facility: CLINIC | Age: 63
End: 2023-01-10

## 2023-01-10 DIAGNOSIS — Z79.01 LONG TERM (CURRENT) USE OF ANTICOAGULANTS: ICD-10-CM

## 2023-01-10 DIAGNOSIS — M80.08XD FRACTURE OF VERTEBRA DUE TO OSTEOPOROSIS WITH ROUTINE HEALING, SUBSEQUENT ENCOUNTER: Primary | ICD-10-CM

## 2023-01-10 DIAGNOSIS — I26.99 PULMONARY EMBOLISM WITH INFARCTION (H): Primary | ICD-10-CM

## 2023-01-10 DIAGNOSIS — G62.0 POLYNEUROPATHY DUE TO DRUG (H): ICD-10-CM

## 2023-01-10 DIAGNOSIS — G62.9 PERIPHERAL POLYNEUROPATHY: ICD-10-CM

## 2023-01-10 DIAGNOSIS — G40.909 NONINTRACTABLE EPILEPSY WITHOUT STATUS EPILEPTICUS, UNSPECIFIED EPILEPSY TYPE (H): ICD-10-CM

## 2023-01-10 LAB — INR (EXTERNAL): 2.5 (ref 0.9–1.1)

## 2023-01-10 NOTE — PROGRESS NOTES
ANTICOAGULATION MANAGEMENT     Valentina Olmedo 62 year old female is on warfarin with therapeutic INR result. (Goal INR 2.0-3.0)    Recent labs: (last 7 days)     01/10/23  1513   INR 2.5*       ASSESSMENT       Source(s): Chart Review and Home Care/Facility Nurse       Warfarin doses taken: Warfarin taken as instructed    Diet: No new diet changes identified    New illness, injury, or hospitalization: Persistent dizziness. Saw provider on 12/14 and received OT referral. Pt will be seeing ENT in March.    Medication/supplement changes: None noted    Signs or symptoms of bleeding or clotting: No    Previous INR: Subtherapeutic    Additional findings: Cigarettes only when outside the home (running errands/traveling)       PLAN     Recommended plan for ongoing change(s) affecting INR     Dosing Instructions: Continue your current warfarin dose with next INR in 1 week       Summary  As of 1/10/2023    Full warfarin instructions:  10 mg every Mon, Fri; 12.5 mg all other days   Next INR check:  1/17/2023             Telephone call with [facility] nurse who verbalizes understanding and agrees to plan    Orders given to  Homecare nurse/facility to recheck    Education provided:     Goal range and lab monitoring: goal range and significance of current result    Plan made per [facility] anticoagulation protocol    Arden Shipman RN  Anticoagulation Clinic  1/10/2023    _______________________________________________________________________     Anticoagulation Episode Summary     Current INR goal:  2.0-3.0   TTR:  37.1 % (1 y)   Target end date:  Indefinite   Send INR reminders to:  [facility]    Indications    History of pulmonary embolism (Resolved) [Z86.711]  Pulmonary embolism with infarction (H) [I26.99]  Long term (current) use of anticoagulants [Z79.01]           Comments:           Anticoagulation Care Providers     Provider Role Specialty Phone number    Promise Vanegas MD Referring Family Medicine  [phone]    Donald Rooney MD Referring Internal Medicine [phone]    Jose Maria Morin MD Referring Family Medicine [phone]

## 2023-01-10 NOTE — TELEPHONE ENCOUNTER
General Call    Contacts       Type Contact Phone/Fax    01/10/2023 03:37 PM CST Phone (Incoming) Ana (Home Care)      [facility]        Reason for Call:  Request for Order    What are your questions or concerns:  Ana with [facility] calling to request order for lift chair. Order can be faxed to patient's CADI , Jose at [phone] and duplicate copy to [facility] at [phone].    Date of last appointment with provider: 10/28/22

## 2023-01-11 ENCOUNTER — MEDICAL CORRESPONDENCE (OUTPATIENT)
Dept: HEALTH INFORMATION MANAGEMENT | Facility: CLINIC | Age: 63
End: 2023-01-11

## 2023-01-12 ENCOUNTER — MEDICAL CORRESPONDENCE (OUTPATIENT)
Dept: HEALTH INFORMATION MANAGEMENT | Facility: CLINIC | Age: 63
End: 2023-01-12

## 2023-01-12 ENCOUNTER — HOSPITAL ENCOUNTER (OUTPATIENT)
Dept: CARDIAC REHAB | Facility: HOSPITAL | Age: 63
Discharge: HOME | End: 2023-01-12
Attending: INTERNAL MEDICINE
Payer: COMMERCIAL

## 2023-01-12 PROCEDURE — G0239 OTH RESP PROC, GROUP: HCPCS

## 2023-01-12 NOTE — CONFIDENTIAL NOTE
I believe I signed an order for a lift chair earlier this year.  Please give verbal.  I suspect she will need a face-to-face visit however in order to justify the DME order.

## 2023-01-13 NOTE — TELEPHONE ENCOUNTER
DME order on 3/16/22 was for WHEELCHAIR SCOOTER and not for LIFT Chair as requested below.    I could not find a corresponding order for a lift chair in the past year.    Please place order as appropriate to be fax'd.

## 2023-01-13 NOTE — TELEPHONE ENCOUNTER
New order created for requested.  Of note's, patient has not had face-to-face with Dr. Morin recently.  I am submitting as the covering provider for Dr. Morin Will submit so hopefully will be complied with if not patient may need an appointment with Dr. Morin  in the next month

## 2023-01-13 NOTE — TELEPHONE ENCOUNTER
DME (Durable Medical Equipment) Orders and Documentation  Orders Placed This Encounter   Procedures     Wheelchair Order for DME - ONLY FOR DME     Lift Chair W Seat Lift Mechanism Order      The patient was assessed and it was determined the patient is in need of the following listed DME Supplies/Equipment. Please complete supporting documentation below to demonstrate medical necessity.      Wheelchair Documentation  1. The patient has mobility limitations that impairs their ability to participate in one or more mobility related activities: Toileting, Grooming and Bathing.  2. The patient's mobility limitations cannot be safely resolved by using a cane/walker:Yes  3. The patients home has adequate access to use a manual wheelchair:Yes  4. The use of a manual wheelchair on a regular basis will improve the patients ability to participate in mobility related ADL's at home:Yes  5. The patient is willing to use a manual wheelchair at home:Yes  6. The patient has adequate upper body strength and the mental capability to safely use a manual wheelchair and/or has a caregiver that is able to assist: Yes  7. Does the patient have a lower extremity injury or edema?Yes    Reason for Type of Wheelchair  Patient weight: 0 lbs 0 oz    **Use of a manual wheelchair will significantly improve the patient's ability to participate in MRADLs and the patient will use it on a regular basis in the home. The patient has not expressed an unwillingness to use the manual wheelchair that is provided in the home.**

## 2023-01-13 NOTE — TELEPHONE ENCOUNTER
Ana with [facility] calling to request clarification of order. They are requesting lift chair/recliner but received order for wheelchair.

## 2023-01-17 ENCOUNTER — ANTICOAGULATION THERAPY VISIT (OUTPATIENT)
Dept: ANTICOAGULATION | Facility: CLINIC | Age: 63
End: 2023-01-17
Payer: COMMERCIAL

## 2023-01-17 DIAGNOSIS — I26.99 PULMONARY EMBOLISM WITH INFARCTION (H): Primary | ICD-10-CM

## 2023-01-17 DIAGNOSIS — Z79.01 LONG TERM (CURRENT) USE OF ANTICOAGULANTS: ICD-10-CM

## 2023-01-17 LAB — INR (EXTERNAL): 2.8 (ref 0.9–1.1)

## 2023-01-17 NOTE — PROGRESS NOTES
ANTICOAGULATION MANAGEMENT     Valentina Olmedo 62 year old female is on warfarin with therapeutic INR result. (Goal INR 2.0-3.0)    Recent labs: (last 7 days)     01/17/23  1400   INR 2.8*       ASSESSMENT       Source(s): Chart Review and Home Care/Facility Nurse       Warfarin doses taken: Warfarin taken as instructed    Diet: No new diet changes identified    New illness, injury, or hospitalization: No    Medication/supplement changes: None noted    Signs or symptoms of bleeding or clotting: No    Previous INR: Therapeutic last visit; previously outside of goal range    Additional findings: INR trending up the last two weeks.       PLAN     Recommended plan for no diet, medication or health factor changes affecting INR     Dosing Instructions: Continue your current warfarin dose with next INR in 1 week       Summary  As of 1/17/2023    Full warfarin instructions:  10 mg every Mon, Fri; 12.5 mg all other days   Next INR check:  1/24/2023             Telephone call with [facility] nurse who verbalizes understanding and agrees to plan    Orders given to  Homecare nurse/facility to recheck    Education provided:     Goal range and lab monitoring: goal range and significance of current result    Plan made per [facility] anticoagulation protocol    Arden Shipman RN  Anticoagulation Clinic  1/17/2023    _______________________________________________________________________     Anticoagulation Episode Summary     Current INR goal:  2.0-3.0   TTR:  38.2 % (1 y)   Target end date:  Indefinite   Send INR reminders to:  [facility]    Indications    History of pulmonary embolism (Resolved) [Z86.711]  Pulmonary embolism with infarction (H) [I26.99]  Long term (current) use of anticoagulants [Z79.01]           Comments:           Anticoagulation Care Providers     Provider Role Specialty Phone number    Promise Vanegas MD Referring Family Medicine [phone]    Donald Rooney MD Referring Internal Medicine  [phone]    Jose Maria Morin MD [facility] Family Medicine [phone]

## 2023-01-20 DIAGNOSIS — J30.2 SEASONAL ALLERGIC RHINITIS, UNSPECIFIED TRIGGER: ICD-10-CM

## 2023-01-20 NOTE — TELEPHONE ENCOUNTER
Medication Request  Medication name: fexofenadine (ALLEGRA) 180 MG tablet  Requested Pharmacy: [facility] [address]  When was patient last seen?:  10/28/22  Patient offered appointment:  No  Okay to leave a detailed message: no

## 2023-01-21 RX ORDER — FEXOFENADINE HCL 180 MG/1
180 TABLET ORAL DAILY
Qty: 90 TABLET | Refills: 3 | Status: SHIPPED | OUTPATIENT
Start: 2023-01-21 | End: 2023-12-22

## 2023-01-21 NOTE — TELEPHONE ENCOUNTER
"Call placed to pt with provider's results and recommendations. Reviewed results with pt. As recommendations were given, pt interrupted and stated \"I'm bleeding\". RN asked pt to elaborate further. Pt states \"I'm bleeding down there. It's a lot\". Pt states this began yesterday. Pt states she does have a PCP. She does not believe she has an OB/GYN.     Pt's , Jass, with her at time of phone call. Spoke with Jass and recommended she contact PCP immediately and be evaluated. Pt is on warfarin. He stated understanding. Transferred him to pt's PCP office to speak with someone. Advised him pt should be evaluated today if she is bleeding a lot.     Reviewed results and recommendations with pt's  as well. He stated understanding. They would like calciferol nasal spray to be sent to [facility] instead of [facility]. Will have provider send order to [facility]. Will then call [facility] and cancel rx. They are aware they will be contacted to set up orthotics appt as well as bone density scan.   " No

## 2023-01-22 NOTE — TELEPHONE ENCOUNTER
"Last Written Prescription Date:  8/18/22  Last Fill Quantity: 90,  # refills: 1   Last office visit provider:  12/14/22     Requested Prescriptions   Pending Prescriptions Disp Refills     fexofenadine (ALLEGRA) 180 MG tablet 90 tablet 1     Sig: Take 1 tablet (180 mg) by mouth daily       Antihistamines Protocol Passed - 1/20/2023 11:49 AM        Passed - Patient is 3-64 years of age     Apply weight-based dosing for peds patients age 3 - 12 years of age.    Forward request to provider for patients under the age of 3 or over the age of 64.          Passed - Recent (12 mo) or future (30 days) visit within the authorizing provider's specialty     Patient has had an office visit with the authorizing provider or a provider within the authorizing providers department within the previous 12 mos or has a future within next 30 days. See \"Patient Info\" tab in inbasket, or \"Choose Columns\" in Meds & Orders section of the refill encounter.              Passed - Medication is active on med Nadeen Sahu RN 01/21/23 7:46 PM  "

## 2023-01-24 ENCOUNTER — ANTICOAGULATION THERAPY VISIT (OUTPATIENT)
Dept: ANTICOAGULATION | Facility: CLINIC | Age: 63
End: 2023-01-24
Payer: COMMERCIAL

## 2023-01-24 DIAGNOSIS — I26.99 PULMONARY EMBOLISM WITH INFARCTION (H): Primary | ICD-10-CM

## 2023-01-24 DIAGNOSIS — Z79.01 LONG TERM (CURRENT) USE OF ANTICOAGULANTS: ICD-10-CM

## 2023-01-24 LAB — INR (EXTERNAL): 2.5 (ref 0.9–1.1)

## 2023-01-24 NOTE — PROGRESS NOTES
ANTICOAGULATION MANAGEMENT     Valentina Olmedo 62 year old female is on warfarin with therapeutic INR result. (Goal INR 2.0-3.0)    Recent labs: (last 7 days)     01/24/23  1254   INR 2.5*       ASSESSMENT       Source(s): Chart Review and Home Care/Facility Nurse       Warfarin doses taken: Warfarin taken as instructed    Diet: No new diet changes identified    New illness, injury, or hospitalization: No    Medication/supplement changes: None noted    Signs or symptoms of bleeding or clotting: No    Previous INR: Therapeutic last 2(+) visits    Additional findings: None       PLAN     Recommended plan for no diet, medication or health factor changes affecting INR     Dosing Instructions: Continue your current warfarin dose with next INR in 2 weeks       Summary  As of 1/24/2023    Full warfarin instructions:  10 mg every Mon, Fri; 12.5 mg all other days   Next INR check:  2/7/2023             Telephone call with Katelyn home care nurse who verbalizes understanding and agrees to plan    Orders given to  Homecare nurse/facility to recheck    Education provided:     Goal range and lab monitoring: goal range and significance of current result    Plan made per [facility] anticoagulation protocol    Arden Shipman RN  Anticoagulation Clinic  1/24/2023    _______________________________________________________________________     Anticoagulation Episode Summary     Current INR goal:  2.0-3.0   TTR:  40.1 % (1 y)   Target end date:  Indefinite   Send INR reminders to:  [facility]    Indications    History of pulmonary embolism (Resolved) [Z86.711]  Pulmonary embolism with infarction (H) [I26.99]  Long term (current) use of anticoagulants [Z79.01]           Comments:           Anticoagulation Care Providers     Provider Role Specialty Phone number    Promise Vanegas MD Referring Family Medicine [phone]    Donald Rooney MD Referring Internal Medicine [phone]    Jose Maria Morin MD Referring Family Medicine  [phone]

## 2023-01-26 ENCOUNTER — MEDICAL CORRESPONDENCE (OUTPATIENT)
Dept: HEALTH INFORMATION MANAGEMENT | Facility: CLINIC | Age: 63
End: 2023-01-26

## 2023-01-26 DIAGNOSIS — R42 VERTIGO: ICD-10-CM

## 2023-01-26 NOTE — TELEPHONE ENCOUNTER
Medication Request  Medication name: meclizine (ANTIVERT) 25 MG tablet  Requested Pharmacy: [facility], [place]  When was patient last seen?:  12/14/22  Patient offered appointment:  No  Okay to leave a detailed message: no

## 2023-01-27 RX ORDER — MECLIZINE HYDROCHLORIDE 25 MG/1
TABLET ORAL
Qty: 30 TABLET | Refills: 1 | Status: SHIPPED | OUTPATIENT
Start: 2023-01-27 | End: 2023-02-22

## 2023-01-27 NOTE — TELEPHONE ENCOUNTER
"Last Written Prescription Date:  12/29/2022  Last Fill Quantity: 30,  # refills: 1   Last office visit provider:  12/14/2022     Requested Prescriptions   Pending Prescriptions Disp Refills     meclizine (ANTIVERT) 25 MG tablet 30 tablet 1     Sig: TAKE 1/2-1 TABLET BY MOUTH BY MOUTH THREE TIMES A DAY AS NEEDED FOR DIZZINESS        Antivertigo/Antiemetic Agents Passed - 1/27/2023  4:16 PM        Passed - Recent (12 mo) or future (30 days) visit within the authorizing provider's specialty     Patient has had an office visit with the authorizing provider or a provider within the authorizing providers department within the previous 12 mos or has a future within next 30 days. See \"Patient Info\" tab in inbasket, or \"Choose Columns\" in Meds & Orders section of the refill encounter.              Passed - Medication is active on med list        Passed - Patient is 18 years of age or older             Reanna Still RN 01/27/23 4:17 PM  "

## 2023-02-07 ENCOUNTER — ANTICOAGULATION THERAPY VISIT (OUTPATIENT)
Dept: ANTICOAGULATION | Facility: CLINIC | Age: 63
End: 2023-02-07
Payer: COMMERCIAL

## 2023-02-07 DIAGNOSIS — I26.99 PULMONARY EMBOLISM WITH INFARCTION (H): Primary | ICD-10-CM

## 2023-02-07 DIAGNOSIS — Z79.01 LONG TERM (CURRENT) USE OF ANTICOAGULANTS: ICD-10-CM

## 2023-02-07 LAB — INR (EXTERNAL): 2.4 (ref 0.9–1.1)

## 2023-02-07 NOTE — PROGRESS NOTES
ANTICOAGULATION MANAGEMENT     Valentina Olmedo 62 year old female is on warfarin with therapeutic INR result. (Goal INR 2.0-3.0)    Recent labs: (last 7 days)     02/07/23  1450   INR 2.4*       ASSESSMENT       Source(s): Chart Review and Home Care/Facility Nurse       Warfarin doses taken: Warfarin taken as instructed    Diet: No new diet changes identified    New illness, injury, or hospitalization: No    Medication/supplement changes: None noted    Signs or symptoms of bleeding or clotting: No    Previous INR: Therapeutic last 2(+) visits    Additional findings: None       PLAN     Recommended plan for no diet, medication or health factor changes affecting INR     Dosing Instructions: Continue your current warfarin dose with next INR in 3 weeks       Summary  As of 2/7/2023    Full warfarin instructions:  10 mg every Mon, Fri; 12.5 mg all other days   Next INR check:               Telephone call with Emerita  home care nurse who verbalizes understanding and agrees to plan    Orders given to  Homecare nurse/facility to recheck    Education provided:     Please call back if any changes to your diet, medications or how you've been taking warfarin    Plan made per [facility] anticoagulation protocol    Emerita Ca, RN  Anticoagulation Clinic  2/7/2023    _______________________________________________________________________     Anticoagulation Episode Summary     Current INR goal:  2.0-3.0   TTR:  43.1 % (1 y)   Target end date:  Indefinite   Send INR reminders to:  [facility]    Indications    History of pulmonary embolism (Resolved) [Z86.711]  Pulmonary embolism with infarction (H) [I26.99]  Long term (current) use of anticoagulants [Z79.01]           Comments:           Anticoagulation Care Providers     Provider Role Specialty Phone number    Promise Vanegas MD Referring Family Medicine [phone]    Donald Rooney MD Referring Internal Medicine [phone]    Jose Maria Morin MD Referring  Family Medicine [phone]

## 2023-02-10 ENCOUNTER — TELEPHONE (OUTPATIENT)
Dept: NURSING | Facility: CLINIC | Age: 63
End: 2023-02-10
Payer: COMMERCIAL

## 2023-02-10 ENCOUNTER — MEDICAL CORRESPONDENCE (OUTPATIENT)
Dept: HEALTH INFORMATION MANAGEMENT | Facility: CLINIC | Age: 63
End: 2023-02-10

## 2023-02-10 DIAGNOSIS — F25.1 SCHIZOAFFECTIVE DISORDER, DEPRESSIVE TYPE (H): ICD-10-CM

## 2023-02-10 DIAGNOSIS — G40.909 NONINTRACTABLE EPILEPSY WITHOUT STATUS EPILEPTICUS, UNSPECIFIED EPILEPSY TYPE (H): Primary | ICD-10-CM

## 2023-02-10 DIAGNOSIS — F33.1 MAJOR DEPRESSIVE DISORDER, RECURRENT EPISODE, MODERATE (H): ICD-10-CM

## 2023-02-10 DIAGNOSIS — Z79.899 MEDICATION MANAGEMENT: ICD-10-CM

## 2023-02-10 NOTE — TELEPHONE ENCOUNTER
Home care nurse requesting billable code to resume home care services after hospitalization. Home care can be reached at [phone].    Reanna Still RN on 2/10/2023 at 5:15 PM

## 2023-02-13 NOTE — TELEPHONE ENCOUNTER
Home orders have been prepped below with hopefully will be billable codes.  Please sign orders and  print and fax to [facility] at [phone]

## 2023-02-14 ENCOUNTER — MEDICAL CORRESPONDENCE (OUTPATIENT)
Dept: CARDIAC REHAB | Facility: HOSPITAL | Age: 63
End: 2023-02-14

## 2023-02-14 DIAGNOSIS — Z53.9 DIAGNOSIS NOT YET DEFINED: Primary | ICD-10-CM

## 2023-02-14 PROCEDURE — G0179 MD RECERTIFICATION HHA PT: HCPCS | Performed by: FAMILY MEDICINE

## 2023-02-15 ENCOUNTER — DOCUMENTATION ONLY (OUTPATIENT)
Dept: PSYCHOLOGY | Facility: CLINIC | Age: 63
End: 2023-02-15
Payer: COMMERCIAL

## 2023-02-15 NOTE — PROGRESS NOTES
No show for the patient. Also writer noted some conflict seeing her since patient's  is under writer's care. Patient will be  reoriented should the team reaches her.

## 2023-02-17 ENCOUNTER — OFFICE VISIT (OUTPATIENT)
Dept: FAMILY MEDICINE | Facility: CLINIC | Age: 63
End: 2023-02-17
Payer: COMMERCIAL

## 2023-02-17 ENCOUNTER — ANTICOAGULATION THERAPY VISIT (OUTPATIENT)
Dept: ANTICOAGULATION | Facility: CLINIC | Age: 63
End: 2023-02-17

## 2023-02-17 VITALS
HEART RATE: 86 BPM | RESPIRATION RATE: 20 BRPM | OXYGEN SATURATION: 98 % | DIASTOLIC BLOOD PRESSURE: 90 MMHG | TEMPERATURE: 98.2 F | SYSTOLIC BLOOD PRESSURE: 122 MMHG

## 2023-02-17 DIAGNOSIS — E03.9 ACQUIRED HYPOTHYROIDISM: ICD-10-CM

## 2023-02-17 DIAGNOSIS — F33.1 MAJOR DEPRESSIVE DISORDER, RECURRENT EPISODE, MODERATE (H): ICD-10-CM

## 2023-02-17 DIAGNOSIS — E27.8 ADRENAL MASS, LEFT (H): ICD-10-CM

## 2023-02-17 DIAGNOSIS — F25.1 SCHIZOAFFECTIVE DISORDER, DEPRESSIVE TYPE (H): ICD-10-CM

## 2023-02-17 DIAGNOSIS — E66.01 CLASS 3 SEVERE OBESITY DUE TO EXCESS CALORIES WITH SERIOUS COMORBIDITY AND BODY MASS INDEX (BMI) OF 50.0 TO 59.9 IN ADULT (H): ICD-10-CM

## 2023-02-17 DIAGNOSIS — I10 ESSENTIAL HYPERTENSION: Primary | ICD-10-CM

## 2023-02-17 DIAGNOSIS — Z79.01 LONG TERM (CURRENT) USE OF ANTICOAGULANTS: ICD-10-CM

## 2023-02-17 DIAGNOSIS — G40.909 NONINTRACTABLE EPILEPSY WITHOUT STATUS EPILEPTICUS, UNSPECIFIED EPILEPSY TYPE (H): ICD-10-CM

## 2023-02-17 DIAGNOSIS — M80.08XD FRACTURE OF VERTEBRA DUE TO OSTEOPOROSIS WITH ROUTINE HEALING, SUBSEQUENT ENCOUNTER: ICD-10-CM

## 2023-02-17 DIAGNOSIS — Z86.711 HISTORY OF PULMONARY EMBOLISM: ICD-10-CM

## 2023-02-17 DIAGNOSIS — Z85.850 HISTORY OF THYROID CANCER: ICD-10-CM

## 2023-02-17 DIAGNOSIS — E87.1 HYPONATREMIA: ICD-10-CM

## 2023-02-17 DIAGNOSIS — Z09 HOSPITAL DISCHARGE FOLLOW-UP: ICD-10-CM
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
## 2023-02-17 DIAGNOSIS — I87.8 VENOUS STASIS: ICD-10-CM

## 2023-02-17 DIAGNOSIS — I26.99 PULMONARY EMBOLISM WITH INFARCTION (H): ICD-10-CM

## 2023-02-17 DIAGNOSIS — G62.9 PERIPHERAL POLYNEUROPATHY: ICD-10-CM

## 2023-02-17 DIAGNOSIS — N18.32 CHRONIC KIDNEY DISEASE, STAGE 3B (H): ICD-10-CM

## 2023-02-17 DIAGNOSIS — G62.0 POLYNEUROPATHY DUE TO DRUG (H): ICD-10-CM

## 2023-02-17 DIAGNOSIS — J44.9 CHRONIC OBSTRUCTIVE PULMONARY DISEASE, UNSPECIFIED COPD TYPE (H): ICD-10-CM

## 2023-02-17 DIAGNOSIS — I26.99 PULMONARY EMBOLISM WITH INFARCTION (H): Primary | ICD-10-CM

## 2023-02-17 DIAGNOSIS — F33.8 SEASONAL AFFECTIVE DISORDER (H): ICD-10-CM

## 2023-02-17 DIAGNOSIS — E66.01 MORBID OBESITY (H): ICD-10-CM

## 2023-02-17 DIAGNOSIS — E78.2 MIXED HYPERLIPIDEMIA: ICD-10-CM

## 2023-02-17 DIAGNOSIS — E66.813 CLASS 3 SEVERE OBESITY DUE TO EXCESS CALORIES WITH SERIOUS COMORBIDITY AND BODY MASS INDEX (BMI) OF 50.0 TO 59.9 IN ADULT (H): ICD-10-CM

## 2023-02-17 LAB
ALT SERPL W P-5'-P-CCNC: 29 U/L (ref 10–35)
ANION GAP SERPL CALCULATED.3IONS-SCNC: 11 MMOL/L (ref 7–15)
AST SERPL W P-5'-P-CCNC: 23 U/L (ref 10–35)
BUN SERPL-MCNC: 13.5 MG/DL (ref 8–23)
CALCIUM SERPL-MCNC: 8.5 MG/DL (ref 8.8–10.2)
CHLORIDE SERPL-SCNC: 109 MMOL/L (ref 98–107)
CHOLEST SERPL-MCNC: 200 MG/DL
CREAT SERPL-MCNC: 0.75 MG/DL (ref 0.51–0.95)
DEPRECATED HCO3 PLAS-SCNC: 21 MMOL/L (ref 22–29)
GFR SERPL CREATININE-BSD FRML MDRD: 90 ML/MIN/1.73M2
GLUCOSE SERPL-MCNC: 113 MG/DL (ref 70–99)
HDLC SERPL-MCNC: 86 MG/DL
INR BLD: 3.2 (ref 0.9–1.1)
LDLC SERPL CALC-MCNC: 98 MG/DL
NONHDLC SERPL-MCNC: 114 MG/DL
POTASSIUM SERPL-SCNC: 4 MMOL/L (ref 3.4–5.3)
SODIUM SERPL-SCNC: 141 MMOL/L (ref 136–145)
TRIGL SERPL-MCNC: 78 MG/DL
TSH SERPL DL<=0.005 MIU/L-ACNC: 1.19 UIU/ML (ref 0.3–4.2)

## 2023-02-17 PROCEDURE — 84460 ALANINE AMINO (ALT) (SGPT): CPT | Performed by: FAMILY MEDICINE

## 2023-02-17 PROCEDURE — 84443 ASSAY THYROID STIM HORMONE: CPT | Performed by: FAMILY MEDICINE

## 2023-02-17 PROCEDURE — 36416 COLLJ CAPILLARY BLOOD SPEC: CPT | Performed by: FAMILY MEDICINE

## 2023-02-17 PROCEDURE — 36415 COLL VENOUS BLD VENIPUNCTURE: CPT | Performed by: FAMILY MEDICINE

## 2023-02-17 PROCEDURE — 84450 TRANSFERASE (AST) (SGOT): CPT | Performed by: FAMILY MEDICINE

## 2023-02-17 PROCEDURE — 85610 PROTHROMBIN TIME: CPT | Performed by: FAMILY MEDICINE

## 2023-02-17 PROCEDURE — 99215 OFFICE O/P EST HI 40 MIN: CPT | Performed by: FAMILY MEDICINE

## 2023-02-17 PROCEDURE — 80061 LIPID PANEL: CPT | Performed by: FAMILY MEDICINE

## 2023-02-17 PROCEDURE — 80048 BASIC METABOLIC PNL TOTAL CA: CPT | Performed by: FAMILY MEDICINE

## 2023-02-17 RX ORDER — LOSARTAN POTASSIUM 25 MG/1
25 TABLET ORAL DAILY
Qty: 90 TABLET | Refills: 1 | Status: SHIPPED | OUTPATIENT
Start: 2023-02-17 | End: 2023-03-09

## 2023-02-17 ASSESSMENT — ENCOUNTER SYMPTOMS: CONSTITUTIONAL NEGATIVE: 1

## 2023-02-17 NOTE — PROGRESS NOTES
Problem List Items Addressed This Visit     Adrenal mass, left (H)     No active surveillance plans.          Chronic kidney disease, stage 3b (H)     Kidney function stable.  Opt for ARB as replacement to hydrochlorothiazide.         Chronic obstructive pulmonary disease (H)     Breathing stable.  No change to plan.         Class 3 severe obesity due to excess calories with serious comorbidity and body mass index (BMI) of 50.0 to 59.9 in adult (H)     This patient has actually lost weight over the past 12 months.  At 1 point, she has been as high as 361 pounds.  I think she would still benefit from the bariatric lift bed given the likely scenario that her weight will exceed 350 pounds.         Essential hypertension - Primary     Blood pressure slightly elevated today in the context of removal of hydrochlorothiazide.  She had been on hydrochlorothiazide for a number of years so its unclear how/why this would cause hyponatremia all of a sudden.  Nonetheless, given her recent hospitalization it is reasonable to avoid hydrochlorothiazide going forward.  In the past, she has been on losartan.  There is some discrepancy in whether or not she had side effects with this medication.  She is willing to retry the medicine, in particular as explained the potential kidney function benefits.  We will start at 25 mg losartan and adjust based on response.         Relevant Medications    losartan (COZAAR) 25 MG tablet    Other Relevant Orders    Basic metabolic panel  (Ca, Cl, CO2, Creat, Gluc, K, Na, BUN) (Completed)    Home Blood Pressure Monitor Order for DME - ONLY FOR DME    Fracture of vertebra due to osteoporosis with routine healing, subsequent encounter    Relevant Orders    Compression Sleeve/Stocking Order for DME - ONLY FOR DME    History of thyroid cancer     Thyroid cancer history.  Surgical hypothyroidism.  Not an ongoing health issue.         Hypothyroidism    Relevant Orders    TSH with free T4 reflex  (Completed)    Long term (current) use of anticoagulants    Major depressive disorder, recurrent episode, moderate (H)     The patient states her mood has been stable.  No change to plan.         Mixed hyperlipidemia    Relevant Orders    ALT (Completed)    AST (Completed)    Lipid panel reflex to direct LDL Non-fasting (Completed)    Nonintractable epilepsy without status epilepticus (H)     Continues with skilled nursing.  Fall was likely related to hyponatremia (HCTZ?).  Continue current therapies.         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  Treat SAD with light box?         Seasonal affective disorder (H)     Long history of depression/anxiety.  We reviewed her symptoms and there does seem to be a seasonality to her symptoms.  Trial of light box.  DME order placed.  Discussed appropriate use of this device.         Relevant Orders    SAD Light, 10,000 Lux Order for DME - ONLY FOR DME    Venous stasis    Relevant Orders    Compression Sleeve/Stocking Order for DME - ONLY FOR DME   Other Visit Diagnoses     Hyponatremia        Relevant Orders    Basic metabolic panel  (Ca, Cl, CO2, Creat, Gluc, K, Na, BUN) (Completed)    Lipid panel reflex to direct LDL Non-fasting (Completed)    Hospital discharge follow-up        Fall, subsequent encounter        Relevant Orders    Basic metabolic panel  (Ca, Cl, CO2, Creat, Gluc, K, Na, BUN) (Completed)    Hospital Bed Order for DME - ONLY FOR DME    Home Blood Pressure Monitor Order for DME - ONLY FOR DME    SAD Light, 10,000 Lux Order for DME - ONLY FOR DME    History of pulmonary embolism        Morbid obesity (H)                 Subjective   Valentina is a 62 year old who presents for the following health issues  Chief Complaint    Patient presents with     Follow Up     [facility] 02/07/23 Dcd 02/10/23 Fall.      Hospitals in [place]   Hospital Follow-up Visit:    Hospital/Nursing Home/IP Rehab Facility: [facility]  Date of Admission: 2/7  Date of Discharge: 2/10  Reason(s) for Admission: Fall, hyponatremia    This patient believes that she has recovered from her fall.  Her legs gave out and she fell.  She struck her head.  In the hospital she was found to have hyponatremia.  HCTZ was discontinued.  The patient says that she has had a bit more swelling in her lower extremities over the last couple of weeks although she is not sure why.  In the past, she is worn compression stockings but is struggled to put it on.  She struggles to get in and out of bed and wonders if she might benefit from an adjustable lift bed.    Was your hospitalization related to COVID-19? No   Problems taking medications regularly:  None  Medication changes since discharge: None  Problems adhering to non-medication therapy:  None    Summary of hospitalization:  CareEverywhere information obtained and reviewed  Diagnostic Tests/Treatments reviewed.  Follow up needed: none  Other Healthcare Providers Involved in Patient s Care:         Homecare  Update since discharge: improved.     Plan of care communicated with patient and family     Review of Systems   Constitutional: Negative.    All other systems reviewed and are negative.           Objective    BP (!) 122/90 (BP Location: Left arm, Patient Position: Left side, Cuff Size: Adult Large)   Pulse 86   Temp 98.2  F (36.8  C) (Oral)   Resp 20   LMP  (LMP Unknown)   SpO2 98%   There is no height or weight on file to calculate BMI.  Physical Exam  Vitals reviewed.   Constitutional:       General: She is not in acute distress.     Appearance: Normal appearance. She is not ill-appearing.   HENT:      Head: Normocephalic and atraumatic.      Right Ear: External ear normal.      Left Ear: External ear normal.      Nose: Nose  normal.      Mouth/Throat:      Pharynx: Oropharynx is clear. No oropharyngeal exudate or posterior oropharyngeal erythema.   Eyes:      General: No scleral icterus.        Right eye: No discharge.         Left eye: No discharge.      Extraocular Movements: Extraocular movements intact.      Conjunctiva/sclera: Conjunctivae normal.      Pupils: Pupils are equal, round, and reactive to light.   Neck:      Comments: No thyromegaly.  Cardiovascular:      Rate and Rhythm: Normal rate and regular rhythm.      Heart sounds: Normal heart sounds. No murmur heard.    No friction rub. No gallop.   Pulmonary:      Effort: Pulmonary effort is normal. No respiratory distress.      Breath sounds: Normal breath sounds. No wheezing or rales.   Abdominal:      General: There is no distension.      Palpations: Abdomen is soft. There is no mass.      Tenderness: There is no abdominal tenderness.   Musculoskeletal:         General: No signs of injury. Normal range of motion.      Cervical back: Normal range of motion.      Right lower leg: No edema.      Left lower leg: No edema.   Lymphadenopathy:      Cervical: No cervical adenopathy.   Skin:     General: Skin is warm.      Coloration: Skin is not jaundiced.      Findings: No rash.   Neurological:      General: No focal deficit present.      Mental Status: She is alert and oriented to person, place, and time.      Cranial Nerves: No cranial nerve deficit.      Deep Tendon Reflexes: Reflexes normal.   Psychiatric:         Mood and Affect: Mood normal.              This note has been dictated using voice recognition software. Any grammatical or context distortions are unintentional and inherent to the software      Answers for HPI/ROS submitted by the patient on 2/17/2023  If you checked off any problems, how difficult have these problems made it for you to do your work, take care of things at home, or get along with other people?: Not difficult at all  PHQ9 TOTAL SCORE: 10

## 2023-02-17 NOTE — PROGRESS NOTES
ANTICOAGULATION MANAGEMENT     Valentina Olmedo 62 year old female is on warfarin with supratherapeutic INR result. (Goal INR 2.0-3.0)    Recent labs: (last 7 days)     02/17/23  1435   INR 3.2*       ASSESSMENT       Source(s): Chart Review and Patient/Caregiver Call       Warfarin doses taken: Warfarin taken as instructed    Diet: No new diet changes identified    New illness, injury, or hospitalization: Yes: hospitalized 2/7-    Medication/supplement changes: losartan started on 2/17/23 No interaction anticipated    Signs or symptoms of bleeding or clotting: No    Previous INR: Therapeutic last 2(+) visits    Additional findings: None     Patient states her PCP would like her to check BP daily.  Reviewed best time to check BP and proper techniques.  Patient concerned her machine is not accurate. She will go to her friend's house to check. Advised patient to bring her BP cuff to compare readings.       PLAN     Recommended plan for no diet, medication or health factor changes affecting INR     Dosing Instructions: partial hold then continue your current warfarin dose with next INR in 4 days at next home care visit     Summary  As of 2/17/2023    Full warfarin instructions:  2/17: 7.5 mg; Otherwise 10 mg every Mon, Fri; 12.5 mg all other days   Next INR check:  2/21/2023             Telephone call with Valentina who verbalizes understanding and agrees to plan    Advised patient to have home care check INR on Tuesday's visit    Education provided:     Please call back if any changes to your diet, medications or how you've been taking warfarin    Plan made per [facility] anticoagulation protocol    Emerita Ca, RN  Anticoagulation Clinic  2/17/2023    _______________________________________________________________________     Anticoagulation Episode Summary     Current INR goal:  2.0-3.0   TTR:  44.1 % (1 y)   Target end date:  Indefinite   Send INR reminders to:  [facility]    Indications    History of pulmonary  embolism (Resolved) [Z86.711]  Pulmonary embolism with infarction (H) [I26.99]  Long term (current) use of anticoagulants [Z79.01]           Comments:           Anticoagulation Care Providers     Provider Role Specialty Phone number    Promise Vanegas MD Referring Family Medicine [phone]    Donald Rooney MD Referring Internal Medicine [phone]    Jose Maria Morin MD Referring Family Medicine [phone]

## 2023-02-19 PROBLEM — N18.32 CHRONIC KIDNEY DISEASE, STAGE 3B (H): Status: ACTIVE | Noted: 2023-02-19

## 2023-02-19 PROBLEM — Z85.850 HISTORY OF THYROID CANCER: Status: ACTIVE | Noted: 2020-01-07

## 2023-02-19 PROBLEM — E66.813 CLASS 3 SEVERE OBESITY DUE TO EXCESS CALORIES WITH SERIOUS COMORBIDITY AND BODY MASS INDEX (BMI) OF 50.0 TO 59.9 IN ADULT (H): Status: ACTIVE | Noted: 2021-08-06

## 2023-02-19 PROBLEM — E66.01 CLASS 3 SEVERE OBESITY DUE TO EXCESS CALORIES WITH SERIOUS COMORBIDITY AND BODY MASS INDEX (BMI) OF 50.0 TO 59.9 IN ADULT (H): Status: ACTIVE | Noted: 2021-08-06

## 2023-02-19 PROBLEM — F33.8 SEASONAL AFFECTIVE DISORDER (H): Status: ACTIVE | Noted: 2023-02-19

## 2023-02-19 NOTE — ASSESSMENT & PLAN NOTE
Continues with skilled nursing.  Fall was likely related to hyponatremia (HCTZ?).  Continue current therapies.

## 2023-02-19 NOTE — ASSESSMENT & PLAN NOTE
Long history of depression/anxiety.  We reviewed her symptoms and there does seem to be a seasonality to her symptoms.  Trial of light box.  DME order placed.  Discussed appropriate use of this device.

## 2023-02-19 NOTE — ASSESSMENT & PLAN NOTE
Blood pressure slightly elevated today in the context of removal of hydrochlorothiazide.  She had been on hydrochlorothiazide for a number of years so its unclear how/why this would cause hyponatremia all of a sudden.  Nonetheless, given her recent hospitalization it is reasonable to avoid hydrochlorothiazide going forward.  In the past, she has been on losartan.  There is some discrepancy in whether or not she had side effects with this medication.  She is willing to retry the medicine, in particular as explained the potential kidney function benefits.  We will start at 25 mg losartan and adjust based on response.

## 2023-02-19 NOTE — ASSESSMENT & PLAN NOTE
This patient has actually lost weight over the past 12 months.  At 1 point, she has been as high as 361 pounds.  I think she would still benefit from the bariatric lift bed given the likely scenario that her weight will exceed 350 pounds.

## 2023-02-20 ENCOUNTER — NURSE TRIAGE (OUTPATIENT)
Dept: FAMILY MEDICINE | Facility: CLINIC | Age: 63
End: 2023-02-20
Payer: COMMERCIAL

## 2023-02-20 ENCOUNTER — TELEPHONE (OUTPATIENT)
Dept: FAMILY MEDICINE | Facility: CLINIC | Age: 63
End: 2023-02-20
Payer: COMMERCIAL

## 2023-02-20 ENCOUNTER — MEDICAL CORRESPONDENCE (OUTPATIENT)
Dept: HEALTH INFORMATION MANAGEMENT | Facility: CLINIC | Age: 63
End: 2023-02-20

## 2023-02-20 DIAGNOSIS — S09.90XD TRAUMATIC INJURY OF HEAD, SUBSEQUENT ENCOUNTER: Primary | ICD-10-CM

## 2023-02-20 DIAGNOSIS — Z53.9 DIAGNOSIS NOT YET DEFINED: Primary | ICD-10-CM

## 2023-02-20 PROCEDURE — G0180 MD CERTIFICATION HHA PATIENT: HCPCS | Performed by: FAMILY MEDICINE

## 2023-02-20 NOTE — TELEPHONE ENCOUNTER
Left message to call back for: Jose Morfin-   Information to relay to patient: Please update on DME order for patient per Dr Morin request.      Jose Morfin - . Let him know of the DME orders [phone]

## 2023-02-20 NOTE — TELEPHONE ENCOUNTER
Spoke with patient and she states she is having decreased visoin since after her fall and she forgot to tell Dr. Starr at her appointment. What to do?

## 2023-02-21 ENCOUNTER — ANTICOAGULATION THERAPY VISIT (OUTPATIENT)
Dept: ANTICOAGULATION | Facility: CLINIC | Age: 63
End: 2023-02-21
Payer: COMMERCIAL

## 2023-02-21 DIAGNOSIS — Z79.01 LONG TERM (CURRENT) USE OF ANTICOAGULANTS: ICD-10-CM

## 2023-02-21 DIAGNOSIS — I26.99 PULMONARY EMBOLISM WITH INFARCTION (H): Primary | ICD-10-CM

## 2023-02-21 DIAGNOSIS — R42 VERTIGO: ICD-10-CM

## 2023-02-21 LAB — INR (EXTERNAL): 2.5 (ref 0.9–1.1)

## 2023-02-21 NOTE — TELEPHONE ENCOUNTER
"Patient called for triage at recommendation of provider.   Patient reports constant blurred vision of both eyes after a fall on 2/7, in which she struck her head. She is unable to read fine print but is able to do activities such as watch tv.  Of note, patient states during hospitalization on 2/7 she noticed L facial drooping. Since fall, patient occasionally and unknowingly repeats words, has increased lower extremity weakness, reports face feeling different and the blurred vision of both eyes.   Patient on warfarin for history of PE   Given duration of symptoms and recent hospitalization, please advise how to proceed.      Reason for Disposition    Blurred vision or visual changes and present now and sudden onset or new (e.g., minutes, hours, days)  (Exception: Seeing floaters / black specks OR previously diagnosed migraine headaches with same symptoms.)    Additional Information    Negative: Weakness of the face, arm or leg on one side of the body    Negative: Followed getting substance in the eye    Negative: Foreign body or object is or was lodged in the eye    Negative: Followed an eye injury    Negative: Followed sun lamp or sun exposure (UV keratitis)    Negative: Yellow or green discharge (pus) in the eye    Negative: Pregnant    Negative: Complete loss of vision in 1 or both eyes    Negative: SEVERE eye pain    Negative: Severe headache    Negative: Double vision    Answer Assessment - Initial Assessment Questions  1. DESCRIPTION: \"What is the vision loss like? Describe it for me.\" (e.g., complete vision loss, blurred vision, double vision, floaters, etc.)      Blurred  2. LOCATION: \"One or both eyes?\" If one, ask: \"Which eye?\"      Both eyes  3. SEVERITY: \"Can you see anything?\" If Yes, ask: \"What can you see?\" (e.g., fine print)      Unable to read fine print, able to watch television without glasses  4. ONSET: \"When did this begin?\" \"Did it start suddenly or has this been gradual?\"      Noticed after " "fall on 2/7/23. Patient hid head.  5. PATTERN: \"Does this come and go, or has it been constant since it started?\"      constant  6. PAIN: \"Is there any pain in your eye(s)?\"  (Scale 1-10; or mild, moderate, severe)      No  7. CONTACTS-GLASSES: \"Do you wear contacts or glasses?\"      Prescription glasses  8. CAUSE: \"What do you think is causing this visual problem?\"      Unsure, started after hitting head during fall  9. OTHER SYMPTOMS: \"Do you have any other symptoms?\" (e.g., confusion, headache, arm or leg weakness, speech problems)       Speech (sometimes unknowingly repeats words), face feels different (reports some facial drooping of left side when she was in the hospital), weaker in legs after fall  10. PREGNANCY: \"Is there any chance you are pregnant?\" \"When was your last menstrual period?\"        No    Protocols used: VISION LOSS OR CHANGE-A-OH    "

## 2023-02-21 NOTE — TELEPHONE ENCOUNTER
Progressive neurologic symptoms in the context of had fall while anticoagulated.  Initial imaging without abnormalities.  She seemed quite normal to me when I saw her in clinic last week.  Sodium has now resolved.  Recommend head CT to evaluate subdural hematoma.  Order placed.

## 2023-02-21 NOTE — TELEPHONE ENCOUNTER
"Left message to call back for: patient   Information to relay to patient: transfer to [facility] RN line re below-    \"Wondering if Valentina could be called and triaged.  She seemed okay when I saw her last week.  Has she ever seen an eye doctor?\"    "

## 2023-02-21 NOTE — PROGRESS NOTES
ANTICOAGULATION MANAGEMENT     Valentina Olmedo 63 year old female is on warfarin with therapeutic INR result. (Goal INR 2.0-3.0)    Recent labs: (last 7 days)     02/21/23  1438   INR 2.5*       ASSESSMENT       Source(s): Chart Review and Home Care/Facility Nurse       Warfarin doses taken: Warfarin taken as instructed    Diet: No new diet changes identified    New illness, injury, or hospitalization: No    Medication/supplement changes: None noted    Signs or symptoms of bleeding or clotting: No    Previous INR: Supratherapeutic    Additional findings: None       PLAN     Recommended plan for no diet, medication or health factor changes affecting INR     Dosing Instructions: Continue your current warfarin dose with next INR in 1 week       Summary  As of 2/21/2023    Full warfarin instructions:  10 mg every Mon, Fri; 12.5 mg all other days   Next INR check:  2/28/2023             Detailed voice message left for Katelyn home care nurse with dosing instructions and follow up date.     Orders given to  Homecare nurse/facility to recheck    Education provided:     Please call back if any changes to your diet, medications or how you've been taking warfarin    Plan made per [facility] anticoagulation protocol    Melissa Montenegro, RN  Anticoagulation Clinic  2/21/2023    _______________________________________________________________________     Anticoagulation Episode Summary     Current INR goal:  2.0-3.0   TTR:  43.7 % (1 y)   Target end date:  Indefinite   Send INR reminders to:  [facility]    Indications    History of pulmonary embolism (Resolved) [Z86.711]  Pulmonary embolism with infarction (H) [I26.99]  Long term (current) use of anticoagulants [Z79.01]           Comments:           Anticoagulation Care Providers     Provider Role Specialty Phone number    Promise Vanegas MD Referring Family Medicine [phone]    Donald Rooney MD Referring Internal Medicine [phone]    Jose Maria Morin MD Referring  Family Medicine [phone]

## 2023-02-21 NOTE — TELEPHONE ENCOUNTER
Patient notified of provider message as below, patient verbalized understanding. Scheduling number given.

## 2023-02-22 RX ORDER — MECLIZINE HYDROCHLORIDE 25 MG/1
TABLET ORAL
Qty: 30 TABLET | Refills: 11 | Status: SHIPPED | OUTPATIENT
Start: 2023-02-22 | End: 2023-07-11

## 2023-02-23 NOTE — TELEPHONE ENCOUNTER
"Last Written Prescription Date:  1/27/23  Last Fill Quantity: 30,  # refills: 1   Last office visit provider:  2/17/23     Requested Prescriptions   Pending Prescriptions Disp Refills     meclizine (ANTIVERT) 25 MG tablet [Pharmacy Med Name: Meclizine HCl 25MG TABS] 30 tablet 1     Sig: TAKE 1/2-1 TABLET BY MOUTH BY MOUTH THREE TIMES A DAY AS NEEDED FOR DIZZINESS        Antivertigo/Antiemetic Agents Passed - 2/21/2023  2:20 PM        Passed - Recent (12 mo) or future (30 days) visit within the authorizing provider's specialty     Patient has had an office visit with the authorizing provider or a provider within the authorizing providers department within the previous 12 mos or has a future within next 30 days. See \"Patient Info\" tab in inbasket, or \"Choose Columns\" in Meds & Orders section of the refill encounter.              Passed - Medication is active on med list        Passed - Patient is 18 years of age or older             Nadeen Stubbs RN 02/22/23 6:05 PM  "

## 2023-02-24 ENCOUNTER — TELEPHONE (OUTPATIENT)
Dept: FAMILY MEDICINE | Facility: CLINIC | Age: 63
End: 2023-02-24
Payer: COMMERCIAL

## 2023-02-24 NOTE — TELEPHONE ENCOUNTER
The Home Care/Assisted Living/Nursing Facility is calling regarding an established patient.  Has the patient seen Home Care in the past or is currently residing in Assisted Living or Nursing Facility? Yes.     Barbara calling from [facility] requesting the following orders that are within the Home Care, Assisted Living or Nursing Home Eval and Treatment standing order and can be signed as standing order signature required by RN.    Preferred Call Back Number: [phone]    Home Care Visits Continuation    Any additional Orders:  Are there any orders requested, not stated above, that are outside of the standing order and must be routed to a licensed practitioner for approval?    No    Writer has verified Requestor will send fax to have orders signed.

## 2023-02-24 NOTE — TELEPHONE ENCOUNTER
Reason for Call:  Home Health Care    Barbara with [facility] called regarding orders    Orders are needed for this patient.    OT: 1/week for 4 weeks and 1 as needed    Phone Number Homecare Nurse can be reached at: [phone]    Can we leave a detailed message on this number? YES    Phone number patient can be reached at: [phone]    Call taken on 2/24/2023 at 1:38 PM by Corinne Alexander

## 2023-02-27 ENCOUNTER — MEDICAL CORRESPONDENCE (OUTPATIENT)
Dept: HEALTH INFORMATION MANAGEMENT | Facility: CLINIC | Age: 63
End: 2023-02-27

## 2023-02-28 ENCOUNTER — HOSPITAL ENCOUNTER (OUTPATIENT)
Dept: CT IMAGING | Facility: HOSPITAL | Age: 63
Discharge: HOME | End: 2023-02-28
Attending: FAMILY MEDICINE | Admitting: FAMILY MEDICINE
Payer: COMMERCIAL

## 2023-02-28 ENCOUNTER — ANTICOAGULATION THERAPY VISIT (OUTPATIENT)
Dept: ANTICOAGULATION | Facility: CLINIC | Age: 63
End: 2023-02-28
Payer: COMMERCIAL

## 2023-02-28 DIAGNOSIS — I26.99 PULMONARY EMBOLISM WITH INFARCTION (H): Primary | ICD-10-CM

## 2023-02-28 DIAGNOSIS — S09.90XD TRAUMATIC INJURY OF HEAD, SUBSEQUENT ENCOUNTER: ICD-10-CM

## 2023-02-28 DIAGNOSIS — Z79.01 LONG TERM (CURRENT) USE OF ANTICOAGULANTS: ICD-10-CM

## 2023-02-28 LAB — INR (EXTERNAL): 3.9 (ref 0.9–1.1)

## 2023-02-28 PROCEDURE — 70450 CT HEAD/BRAIN W/O DYE: CPT

## 2023-02-28 NOTE — PROGRESS NOTES
ANTICOAGULATION MANAGEMENT     Valentina Olmedo 63 year old female is on warfarin with supratherapeutic INR result. (Goal INR 2.0-3.0)    Recent labs: (last 7 days)     02/28/23  1248   INR 3.9*       ASSESSMENT       Source(s): Chart Review and Home Care/Facility Nurse       Warfarin doses taken: Warfarin taken as instructed    Diet: Decreased greens/vitamin K in diet; plans to resume previous intake-patient ran out of greens toward the end of the month    New illness, injury, or hospitalization: No    Medication/supplement changes: None noted    Signs or symptoms of bleeding or clotting: No    Previous INR: Therapeutic last visit; previously outside of goal range    Additional findings: None         PLAN     Recommended plan for temporary change(s) affecting INR     Dosing Instructions: partial hold then continue your current warfarin dose with next INR in 1 week       Summary  As of 2/28/2023    Full warfarin instructions:  2/28: 5 mg; Otherwise 10 mg every Mon, Fri; 12.5 mg all other days   Next INR check:  3/7/2023             Telephone call with [place] home care nurse who verbalizes understanding and agrees to plan    Orders given to  Homecare nurse/facility to recheck    Education provided:     Dietary considerations: importance of consistent vitamin K intake    Plan made per [facility] anticoagulation protocol    Emerita Ca, RN  Anticoagulation Clinic  2/28/2023    _______________________________________________________________________     Anticoagulation Episode Summary     Current INR goal:  2.0-3.0   TTR:  44.1 % (1 y)   Target end date:  Indefinite   Send INR reminders to:  [facility]    Indications    History of pulmonary embolism (Resolved) [Z86.711]  Pulmonary embolism with infarction (H) [I26.99]  Long term (current) use of anticoagulants [Z79.01]           Comments:           Anticoagulation Care Providers     Provider Role Specialty Phone number    Promise Vanegas MD Referring Family  Medicine [phone]    Donald Rooney MD Referring Internal Medicine [phone]    Jose Maria Morin MD Referring Family Medicine [phone]

## 2023-03-01 NOTE — RESULT ENCOUNTER NOTE
Please let patient know that the head CT did not show any abnormalities.  This was done in the context of head trauma and worsening headaches and decreased vision.  Please ask patient how she is doing overall.  If she continues to have symptoms, I would like to see her in clinic.    Jose Maria Starr MD

## 2023-03-02 ENCOUNTER — TELEPHONE (OUTPATIENT)
Dept: FAMILY MEDICINE | Facility: CLINIC | Age: 63
End: 2023-03-02
Payer: COMMERCIAL

## 2023-03-02 NOTE — TELEPHONE ENCOUNTER
Patient returned call. Clinic RN no longer in clinic at time. Patient requesting call back tomorrow.

## 2023-03-02 NOTE — TELEPHONE ENCOUNTER
----- Message from Jose Maria Morin MD sent at 3/1/2023  2:29 PM CST -----  Please let patient know that the head CT did not show any abnormalities.  This was done in the context of head trauma and worsening headaches and decreased vision.  Please ask patient how she is doing overall.  If she continues to have symptoms, I would like to see her in clinic.    Jose Maria Starr MD

## 2023-03-02 NOTE — TELEPHONE ENCOUNTER
Left message to call back for: patient   Information to relay to patient: transfer to [facility] RN line

## 2023-03-03 ENCOUNTER — TELEPHONE (OUTPATIENT)
Dept: FAMILY MEDICINE | Facility: CLINIC | Age: 63
End: 2023-03-03
Payer: COMMERCIAL

## 2023-03-03 NOTE — TELEPHONE ENCOUNTER
Order/Referral Request    Who is requesting: [facility] Speech Therapist     Orders being requested: Speech Therapy 1x a week for 6 weeks    Reason service is needed/diagnosis: Speech therapy     When are orders needed by: ASAP    Has this been discussed with Provider: Yes    Does patient have a preference on a Group/Provider/Facility? [facility]    Does patient have an appointment scheduled?: 03/09/23    Where to send orders: [facility]     Could we send this information to you in [facility] or would you prefer to receive a phone call?:   Patient would prefer a phone call     Okay to leave a detailed message?: Yes at Other phone number:  [phone]

## 2023-03-03 NOTE — TELEPHONE ENCOUNTER
Patient returned call. Message relayed.    Patient states her symptoms have continued but not worsened. Scheduled for follow up 3/7/2023 with Dr. Morin.    Patient had no additional questions.

## 2023-03-03 NOTE — TELEPHONE ENCOUNTER
Left message to call back for: Valentina  Information to relay to patient: Relay Message from Dr Morin (below) and set up appointment with Dr Morin if patient continues to have symptoms.

## 2023-03-06 ENCOUNTER — MEDICAL CORRESPONDENCE (OUTPATIENT)
Dept: HEALTH INFORMATION MANAGEMENT | Facility: CLINIC | Age: 63
End: 2023-03-06

## 2023-03-07 ENCOUNTER — ANTICOAGULATION THERAPY VISIT (OUTPATIENT)
Dept: ANTICOAGULATION | Facility: CLINIC | Age: 63
End: 2023-03-07
Payer: COMMERCIAL

## 2023-03-07 ENCOUNTER — MEDICAL CORRESPONDENCE (OUTPATIENT)
Dept: HEALTH INFORMATION MANAGEMENT | Facility: CLINIC | Age: 63
End: 2023-03-07

## 2023-03-07 DIAGNOSIS — Z79.01 LONG TERM (CURRENT) USE OF ANTICOAGULANTS: ICD-10-CM

## 2023-03-07 DIAGNOSIS — I26.99 PULMONARY EMBOLISM WITH INFARCTION (H): Primary | ICD-10-CM

## 2023-03-07 LAB — INR (EXTERNAL): 3.9 (ref 0.9–1.1)

## 2023-03-07 NOTE — PROGRESS NOTES
ANTICOAGULATION MANAGEMENT     Valentina Olmedo 63 year old female is on warfarin with supratherapeutic INR result. (Goal INR 2.0-3.0)    Recent labs: (last 7 days)     03/07/23  1354   INR 3.9*       ASSESSMENT       Source(s): Chart Review and Home Care/Facility Nurse       Warfarin doses taken: Warfarin taken as instructed    Diet: No new diet changes identified    New illness, injury, or hospitalization: No    Medication/supplement changes: None noted    Signs or symptoms of bleeding or clotting: No    Previous INR: Supratherapeutic    Additional findings: None         PLAN     Recommended plan for no diet, medication or health factor changes affecting INR     Dosing Instructions: partial hold then decrease your warfarin dose (9.1% change) with next INR in 1 week       Summary  As of 3/7/2023    Full warfarin instructions:  3/7: 5 mg; Otherwise 12.5 mg every Sun, Thu; 10 mg all other days   Next INR check:  3/14/2023             Telephone call with [facility] nurse who agrees to plan and repeated back plan correctly    Orders given to  Homecare nurse/facility to recheck    Education provided:     None required    Plan made per [facility] anticoagulation protocol    Kindra Garay, RN  Anticoagulation Clinic  3/7/2023    _______________________________________________________________________     Anticoagulation Episode Summary     Current INR goal:  2.0-3.0   TTR:  44.1 % (1 y)   Target end date:  Indefinite   Send INR reminders to:  [facility]    Indications    History of pulmonary embolism (Resolved) [Z86.711]  Pulmonary embolism with infarction (H) [I26.99]  Long term (current) use of anticoagulants [Z79.01]           Comments:           Anticoagulation Care Providers     Provider Role Specialty Phone number    Promise Vanegas MD Referring Family Medicine [phone]    Donald Rooney MD Referring Internal Medicine [phone]    Jose Maria Morin MD Referring Family Medicine  [phone]

## 2023-03-08 ENCOUNTER — OFFICE VISIT (OUTPATIENT)
Dept: OTOLARYNGOLOGY | Facility: CLINIC | Age: 63
End: 2023-03-08
Attending: FAMILY MEDICINE
Payer: COMMERCIAL

## 2023-03-08 ENCOUNTER — OFFICE VISIT (OUTPATIENT)
Dept: AUDIOLOGY | Facility: CLINIC | Age: 63
End: 2023-03-08
Attending: FAMILY MEDICINE
Payer: COMMERCIAL

## 2023-03-08 DIAGNOSIS — H90.3 SENSORINEURAL HEARING LOSS, BILATERAL: Primary | ICD-10-CM

## 2023-03-08 DIAGNOSIS — H90.5 SENSORINEURAL HEARING LOSS (SNHL), UNSPECIFIED LATERALITY: ICD-10-CM

## 2023-03-08 DIAGNOSIS — R42 LIGHTHEADEDNESS: Primary | ICD-10-CM

## 2023-03-08 DIAGNOSIS — H90.5 SENSORINEURAL HEARING LOSS (SNHL), UNSPECIFIED LATERALITY: Primary | ICD-10-CM

## 2023-03-08 DIAGNOSIS — R42 VERTIGO: ICD-10-CM

## 2023-03-08 PROCEDURE — 99213 OFFICE O/P EST LOW 20 MIN: CPT | Performed by: OTOLARYNGOLOGY

## 2023-03-08 PROCEDURE — 92567 TYMPANOMETRY: CPT | Performed by: AUDIOLOGIST

## 2023-03-08 PROCEDURE — 92557 COMPREHENSIVE HEARING TEST: CPT | Performed by: AUDIOLOGIST

## 2023-03-08 NOTE — LETTER
"    3/8/2023         RE: Valentina Olmedo  [address]  [facility] [address]        Dear Colleague,    Thank you for referring your patient, Valentina Olmedo, to the [facility]. Please see a copy of my visit note below.    HPI: This patient is a 64yo F who presents for evaluation of dizziness at the request of Dr. Rocha. She has venous stasis, HTN, two concussions by her report, peripheral neuropathy, and a host of other chronic medical conditions on >20 medications. She reports that when she gets up from a dependent position, it feels like \"everything is spinning,\" like she could  'black out or pass out.\"  There has been a gradual loss of hearing over the past several years in both ears. Denies otalgia, otorrhea, vertigo, and other major medical issues.     Past medical history, surgical history, social history, family history, medications, and allergies have been reviewed with the patient and are documented above.    Review of Systems: a 10-system review was performed. Pertinent positives are noted in the HPI and on a separate scanned document in the chart.    PHYSICAL EXAMINATION:  GEN: no acute distress, normocephalic. Morbidly obese.   EYES: extraocular movements are intact, pupils are equal and round. Sclera clear.   EARS: auricles are normally formed. The external auditory canals are cleared of bilateral cerumen impactions with suction under binocular microscopy.  Tympanic membranes are intact bilaterally with no signs of infection, effusion, retractions, or perforations.  NOSE: anterior nares are patent. There are no masses or lesions. The septum is non-obstructing.  OC/OP: clear, dentition is in good repair. The tongue and palate are fully mobile and symmetric. No masses or lesions.  NECK: soft and supple. No lymphadenopathy or masses. Airway is midline.  NEURO: CN VII and XII symmetric. alert and oriented. No spontaneous nystagmus. Uses a wheelchair.  PULM: breathing " comfortably on room air, normal chest expansion with respiration  CARDS: no cyanosis or clubbing, normal carotid pulses    AUDIOGRAM: mild to moderate SNHL bilaterally with type A tymps and symmetric WRS    CT HEAD 2/23: not an ideal scan for the IACs, but both are well visualized on this scan and there are no asymmetries noted    MEDICAL DECISION-MAKING: This patient is a 64yo F with sensorineural hearing loss. Discussed hearing protection. Medically clear for hearing aids should the patient desire them. As far as the dizziness is concerned, she is describing lightheadedness especially when getting up from sitting. She has a multitude of health issues that could contribute to that symptom and she is not describing vertigo or oscillopsia, so further investigation into otologic sources does not seem high yield at this point.         Again, thank you for allowing me to participate in the care of your patient.        Sincerely,        Paula Renae MD

## 2023-03-08 NOTE — PROGRESS NOTES
AUDIOLOGY REPORT    SUMMARY: Audiology visit completed. Valentina is accompanied by her  at the visit today. See audiogram for results.     RECOMMENDATIONS: Follow-up with ENT.    Leesa Parsons, [facility]  [place] Licensed Audiologist #[id number]

## 2023-03-08 NOTE — PROGRESS NOTES
"HPI: This patient is a 64yo F who presents for evaluation of dizziness at the request of Dr. Rocha. She has venous stasis, HTN, two concussions by her report, peripheral neuropathy, and a host of other chronic medical conditions on >20 medications. She reports that when she gets up from a dependent position, it feels like \"everything is spinning,\" like she could  'black out or pass out.\"  There has been a gradual loss of hearing over the past several years in both ears. Denies otalgia, otorrhea, vertigo, and other major medical issues.     Past medical history, surgical history, social history, family history, medications, and allergies have been reviewed with the patient and are documented above.    Review of Systems: a 10-system review was performed. Pertinent positives are noted in the HPI and on a separate scanned document in the chart.    PHYSICAL EXAMINATION:  GEN: no acute distress, normocephalic. Morbidly obese.   EYES: extraocular movements are intact, pupils are equal and round. Sclera clear.   EARS: auricles are normally formed. The external auditory canals are cleared of bilateral cerumen impactions with suction under binocular microscopy.  Tympanic membranes are intact bilaterally with no signs of infection, effusion, retractions, or perforations.  NOSE: anterior nares are patent. There are no masses or lesions. The septum is non-obstructing.  OC/OP: clear, dentition is in good repair. The tongue and palate are fully mobile and symmetric. No masses or lesions.  NECK: soft and supple. No lymphadenopathy or masses. Airway is midline.  NEURO: CN VII and XII symmetric. alert and oriented. No spontaneous nystagmus. Uses a wheelchair.  PULM: breathing comfortably on room air, normal chest expansion with respiration  CARDS: no cyanosis or clubbing, normal carotid pulses    AUDIOGRAM: mild to moderate SNHL bilaterally with type A tymps and symmetric WRS    CT HEAD 2/23: not an ideal scan for the IACs, but " both are well visualized on this scan and there are no asymmetries noted    MEDICAL DECISION-MAKING: This patient is a 64yo F with sensorineural hearing loss. Discussed hearing protection. Medically clear for hearing aids should the patient desire them. As far as the dizziness is concerned, she is describing lightheadedness especially when getting up from sitting. She has a multitude of health issues that could contribute to that symptom and she is not describing vertigo or oscillopsia, so further investigation into otologic sources does not seem high yield at this point.

## 2023-03-09 ENCOUNTER — MEDICAL CORRESPONDENCE (OUTPATIENT)
Dept: HEALTH INFORMATION MANAGEMENT | Facility: CLINIC | Age: 63
End: 2023-03-09

## 2023-03-09 ENCOUNTER — OFFICE VISIT (OUTPATIENT)
Dept: FAMILY MEDICINE | Facility: CLINIC | Age: 63
End: 2023-03-09
Payer: COMMERCIAL

## 2023-03-09 ENCOUNTER — TELEPHONE (OUTPATIENT)
Dept: FAMILY MEDICINE | Facility: CLINIC | Age: 63
End: 2023-03-09

## 2023-03-09 VITALS
OXYGEN SATURATION: 99 % | HEIGHT: 67 IN | SYSTOLIC BLOOD PRESSURE: 125 MMHG | BODY MASS INDEX: 45.99 KG/M2 | WEIGHT: 293 LBS | HEART RATE: 81 BPM | RESPIRATION RATE: 20 BRPM | DIASTOLIC BLOOD PRESSURE: 79 MMHG

## 2023-03-09 DIAGNOSIS — E66.01 CLASS 3 SEVERE OBESITY DUE TO EXCESS CALORIES WITH SERIOUS COMORBIDITY AND BODY MASS INDEX (BMI) OF 50.0 TO 59.9 IN ADULT (H): ICD-10-CM

## 2023-03-09 DIAGNOSIS — H10.33 ACUTE BACTERIAL CONJUNCTIVITIS OF BOTH EYES: ICD-10-CM

## 2023-03-09 DIAGNOSIS — H53.9 VISION CHANGES: ICD-10-CM

## 2023-03-09 DIAGNOSIS — I10 ESSENTIAL HYPERTENSION: ICD-10-CM

## 2023-03-09 DIAGNOSIS — G62.9 PERIPHERAL POLYNEUROPATHY: Primary | ICD-10-CM

## 2023-03-09 DIAGNOSIS — E66.813 CLASS 3 SEVERE OBESITY DUE TO EXCESS CALORIES WITH SERIOUS COMORBIDITY AND BODY MASS INDEX (BMI) OF 50.0 TO 59.9 IN ADULT (H): ICD-10-CM

## 2023-03-09 DIAGNOSIS — N18.32 CHRONIC KIDNEY DISEASE, STAGE 3B (H): Primary | ICD-10-CM

## 2023-03-09 DIAGNOSIS — R29.6 FALLS FREQUENTLY: ICD-10-CM

## 2023-03-09 PROCEDURE — 99214 OFFICE O/P EST MOD 30 MIN: CPT | Performed by: FAMILY MEDICINE

## 2023-03-09 RX ORDER — LOSARTAN POTASSIUM 25 MG/1
12.5 TABLET ORAL DAILY
Qty: 90 TABLET | Refills: 1 | Status: SHIPPED | OUTPATIENT
Start: 2023-03-09 | End: 2024-02-01

## 2023-03-09 ASSESSMENT — ANXIETY QUESTIONNAIRES
1. FEELING NERVOUS, ANXIOUS, OR ON EDGE: MORE THAN HALF THE DAYS
2. NOT BEING ABLE TO STOP OR CONTROL WORRYING: MORE THAN HALF THE DAYS
GAD7 TOTAL SCORE: 10
GAD7 TOTAL SCORE: 10
7. FEELING AFRAID AS IF SOMETHING AWFUL MIGHT HAPPEN: SEVERAL DAYS
5. BEING SO RESTLESS THAT IT IS HARD TO SIT STILL: NOT AT ALL
7. FEELING AFRAID AS IF SOMETHING AWFUL MIGHT HAPPEN: SEVERAL DAYS
GAD7 TOTAL SCORE: 10
4. TROUBLE RELAXING: SEVERAL DAYS
8. IF YOU CHECKED OFF ANY PROBLEMS, HOW DIFFICULT HAVE THESE MADE IT FOR YOU TO DO YOUR WORK, TAKE CARE OF THINGS AT HOME, OR GET ALONG WITH OTHER PEOPLE?: NOT DIFFICULT AT ALL
IF YOU CHECKED OFF ANY PROBLEMS ON THIS QUESTIONNAIRE, HOW DIFFICULT HAVE THESE PROBLEMS MADE IT FOR YOU TO DO YOUR WORK, TAKE CARE OF THINGS AT HOME, OR GET ALONG WITH OTHER PEOPLE: NOT DIFFICULT AT ALL
6. BECOMING EASILY ANNOYED OR IRRITABLE: MORE THAN HALF THE DAYS
3. WORRYING TOO MUCH ABOUT DIFFERENT THINGS: MORE THAN HALF THE DAYS

## 2023-03-09 ASSESSMENT — PATIENT HEALTH QUESTIONNAIRE - PHQ9
10. IF YOU CHECKED OFF ANY PROBLEMS, HOW DIFFICULT HAVE THESE PROBLEMS MADE IT FOR YOU TO DO YOUR WORK, TAKE CARE OF THINGS AT HOME, OR GET ALONG WITH OTHER PEOPLE: NOT DIFFICULT AT ALL
SUM OF ALL RESPONSES TO PHQ QUESTIONS 1-9: 15
SUM OF ALL RESPONSES TO PHQ QUESTIONS 1-9: 15

## 2023-03-09 ASSESSMENT — ENCOUNTER SYMPTOMS: EYE PAIN: 1

## 2023-03-09 NOTE — TELEPHONE ENCOUNTER
Order/Referral Request    Who is requesting: Ana Fernando O.T.    Orders being requested: Bariatric Tub Transfer bench    Reason service is needed/diagnosis: Epilepsy and reoccurring falls.    When are orders needed by: ASAP    Has this been discussed with Provider: Yes    Does patient have a preference on a Group/Provider/Facility? [facility]-  will order from proper location, just needs orders.  Fax: [phone]    Does patient have an appointment scheduled?: 05/19/23    Where to send orders: Fax  Fax: [phone]    Could we send this information to you in [facility] or would you prefer to receive a phone call?:   Patient would prefer a phone call     Okay to leave a detailed message?: Yes at Other phone number:

## 2023-03-09 NOTE — ASSESSMENT & PLAN NOTE
Patient describes some sense of imbalance which could be multifactorial.  I am not convinced that her current dose of antihypertensive Medication is correct and have suggested a slight decrease in losartan dosing.  She will start taking 12.5 mg daily going forward.  She has nurse cares that can check her blood pressure and would report back if her blood pressure is excessively high.

## 2023-03-09 NOTE — ASSESSMENT & PLAN NOTE
Patient returns to clinic to clarify some of the symptoms that prompted a repeat CT scan.  There is no evidence of subdural hematoma.  She has had some experience of gait instability.  Polypharmacy may be contributory.  Deconditioning also might be contributory.  What caught my attention today is that she is describing changes in vision.  She finds that she can see the television more clearly without her glasses than previously.  She does not necessarily describe diplopia but believes that there is something abnormal with her eyes.  I think it would be worthwhile for her to be evaluated by ophthalmology.  Funduscopic exam in clinic was without papilledema.  Cranial nerves seem to be intact although her response time with communication was slightly decreased in comparison to past interactions with this individual.

## 2023-03-09 NOTE — PROGRESS NOTES
"      Problem List Items Addressed This Visit     Chronic kidney disease, stage 3b (H) - Primary    Essential hypertension     Patient describes some sense of imbalance which could be multifactorial.  I am not convinced that her current dose of antihypertensive Medication is correct and have suggested a slight decrease in losartan dosing.  She will start taking 12.5 mg daily going forward.  She has nurse cares that can check her blood pressure and would report back if her blood pressure is excessively high.         Relevant Medications    losartan (COZAAR) 25 MG tablet    Vision changes     Patient returns to clinic to clarify some of the symptoms that prompted a repeat CT scan.  There is no evidence of subdural hematoma.  She has had some experience of gait instability.  Polypharmacy may be contributory.  Deconditioning also might be contributory.  What caught my attention today is that she is describing changes in vision.  She finds that she can see the television more clearly without her glasses than previously.  She does not necessarily describe diplopia but believes that there is something abnormal with her eyes.  I think it would be worthwhile for her to be evaluated by ophthalmology.  Funduscopic exam in clinic was without papilledema.  Cranial nerves seem to be intact although her response time with communication was slightly decreased in comparison to past interactions with this individual.         Relevant Orders    Adult Eye  Referral   Other Visit Diagnoses     Acute bacterial conjunctivitis of both eyes                 Magda Montgomery is a 63 year old who presents for the following health issues   Chief Complaint   Patient presents with     Eye Problem      Visual changes:   - \"I got gunk in my eyes.\"   - she thinks her balance problems are related to medications   - she wonders about medication side effects.    - BP is checked by home nursing staff.  No fluctuating.   -     Eye Problem " "    History of Present Illness       Reason for visit:  My eyesver since    She eats 2-3 servings of fruits and vegetables daily.She consumes 0 sweetened beverage(s) daily.She exercises with enough effort to increase her heart rate 9 or less minutes per day.  She exercises with enough effort to increase her heart rate 4 days per week.   She is taking medications regularly.    Today's PHQ-9         PHQ-9 Total Score: 15    PHQ-9 Q9 Thoughts of better off dead/self-harm past 2 weeks :   More than half the days  Thoughts of suicide or self harm: (P) No  Self-harm Plan:     Self-harm Action:       Safety concerns for self or others: (P) No    How difficult have these problems made it for you to do your work, take care of things at home, or get along with other people: Not difficult at all  Today's RUY-7 Score: 10    Review of Systems   Eyes: Positive for pain.        Objective    /79 (BP Location: Left arm, Patient Position: Sitting, Cuff Size: Adult Large)   Pulse 81   Resp 20   Ht 1.689 m (5' 6.5\")   Wt (!) 155 kg (341 lb 11.2 oz)   LMP  (LMP Unknown)   SpO2 99%   BMI 54.33 kg/m    Body mass index is 54.33 kg/m .  Physical Exam  Nursing note reviewed.   Constitutional:       General: She is not in acute distress.     Appearance: Normal appearance. She is not ill-appearing.   HENT:      Head: Normocephalic and atraumatic.   Eyes:      Extraocular Movements: Extraocular movements intact.      Conjunctiva/sclera: Conjunctivae normal.      Comments: No papilledema.  No discharge.  Pupils are equal round reactive to light and accommodating.  Cranial nerves without abnormalities.   Pulmonary:      Effort: Pulmonary effort is normal.   Neurological:      Mental Status: She is alert and oriented to person, place, and time.   Psychiatric:         Attention and Perception: Attention normal.         Mood and Affect: Mood normal.         Speech: Speech normal.         Thought Content: Thought content normal.      "           This note has been dictated using voice recognition software. Any grammatical or context distortions are unintentional and inherent to the software

## 2023-03-13 ENCOUNTER — MEDICAL CORRESPONDENCE (OUTPATIENT)
Dept: HEALTH INFORMATION MANAGEMENT | Facility: CLINIC | Age: 63
End: 2023-03-13

## 2023-03-13 ENCOUNTER — VIRTUAL VISIT (OUTPATIENT)
Dept: PULMONOLOGY | Facility: CLINIC | Age: 63
End: 2023-03-13
Payer: COMMERCIAL

## 2023-03-13 DIAGNOSIS — F17.210 CIGARETTE NICOTINE DEPENDENCE: ICD-10-CM

## 2023-03-13 DIAGNOSIS — F17.211 CIGARETTE NICOTINE DEPENDENCE IN REMISSION: ICD-10-CM

## 2023-03-13 DIAGNOSIS — R06.00 DYSPNEA, UNSPECIFIED TYPE: ICD-10-CM

## 2023-03-13 DIAGNOSIS — J30.9 ALLERGIC RHINITIS, UNSPECIFIED SEASONALITY, UNSPECIFIED TRIGGER: ICD-10-CM

## 2023-03-13 DIAGNOSIS — R05.1 ACUTE COUGH: Primary | ICD-10-CM

## 2023-03-13 DIAGNOSIS — G47.30 SLEEP DISORDER BREATHING: ICD-10-CM

## 2023-03-13 DIAGNOSIS — E66.01 MORBID OBESITY (H): ICD-10-CM

## 2023-03-13 PROCEDURE — 99442 PR PHYSICIAN TELEPHONE EVALUATION 11-20 MIN: CPT | Performed by: NURSE PRACTITIONER

## 2023-03-13 RX ORDER — PREDNISONE 20 MG/1
40 TABLET ORAL DAILY
Qty: 10 TABLET | Refills: 0 | Status: SHIPPED | OUTPATIENT
Start: 2023-03-13 | End: 2023-03-18

## 2023-03-13 NOTE — NURSING NOTE
Is the patient currently in the state of [place]? YES    Visit mode:TELEPHONE    If the visit is dropped, the patient can be reconnected by: TELEPHONE VISIT: Phone number: [phone]    Will anyone else be joining the visit? NO      How would you like to obtain your AVS? MyChart    Are changes needed to the allergy or medication list? NO    Reason for visit: follow up      Elvia Argueta VF

## 2023-03-13 NOTE — PROGRESS NOTES
Telephone-Visit Details    Type of service:  Telephone Visit    Video Start Time (time video started): 2:24 PM    Video End Time (time video stopped): 2:37 PM     Originating Location (pt. Location): Home        Distant Location (provider location):  On-site    Mode of Communication:  Video Conference via [facility]        PULMONARY OUTPATIENT FOLLOW UP NOTE   March 13, 2023       Assessment:      1. Dyspnea  Secondary to significant deconditioning, body habitus   PFT showed normal spirometry, lung volumes and diffusion capacity.   Continue albuterol HFA as needed. Referral to pulmonary rehab, on hold while she is doing PT for concussion.  2. Allergic rhinitis   Nasal steroid and Allegra prn   3. GERD  Likely the cause of her increased cough  Avoid eating close to bedtime. Raise the head of the bed  Change omeprazole to nexium BID, referral to GI for further evaluation   4. Sleep breathing disorder  Diagnosed with sleep apnea in 2015, sleep study done on 3/26/2015 showed decrease sleep time, AHI 1.8, patient was prescribed with CPAP , but she is not using it. Repeat sleep study in 2021 showed AHI of 91.4.   Sleep hygiene was discussed, try to use CPAP therapy.  5. Physical deconditioning   Referral to pulmonary rehab after finishing PT/OT  6. Tobacco user- quit x 1 month ago  Smoking cessation encouragement   7. Morbid obesity     Plan:      1. Prednisone bursts for recent flare   2. Continue albuterol HFA as needed  3. Reminded her to use Flonase one spray each nostril twice a day and Allegra every day when allergy symptoms flare.    4. Resume CPAP therapy, she is calling to have machine serviced.   5. Avoid eating close to bedtime  6. Raise the head of the bed  7. Eat in the upright position   8. Change omeprazole to nexium twice a day   9. Referral to GI regarding ongoing GERD  10. Referral to pulmonary rehabilitation- on hold.    11. Encouraged continued smoking cessation   12. Follow up 6 months      Herlinda  Rossow, CNP  Pulmonary Medicine  [facility]   [phone]     CC:     No chief complaint on file.     HPI:      Valentina Olmedo is a 63 year old female who presents for evaluation dyspnea.  Patient has history of HTN, hypothyroidism, GERD, schizoaffective disorder, anxiety disorder, PTSD, history of pulmonary embolism anticoagulated, CHRIS, obesity, tobacco userm (quit 1 month ago).   Limited mobility, uses walker in short distance, uses a wheelchair. Had a recent fall at home and hit her head, she reports a concussion in early Feb, still having some dizziness and is in PT/OT.      Patient reports recent worsening cough and chest tightness for a few days, has been using her albuterol inhaler with some relief. Denies change in dyspnea. Denies other acute illness symptoms. Denies fever.   Patient was prescribed with serevent BID by PCP and albuterol inhaler. Infrequent use of Serevent , but she reports improvement in her breathing after using albuterol HFA.   Denies history of asthma, reports tobacco use 1/2 ppd for 45 years, quit recently and is still using Chantix.   Reports disrupted sleep, she has not been using her CPAP and thinks there is air leaking. She is in contact with the DME company to have this fixed.  Reports a history of spring allergies and is not currently taking anything. In the past it was suggested she use Allegra and flonase prn.    Past Medical History :     Past Medical History:   Diagnosis Date     Adrenal mass (H) 10/12/2015    Chen uNll MD  They were incidentally discovered on the CAT scan of the abdomen from December 2011 which was done for evaluation of kidney stones. F/up CT of the abdomen done on 6/26/12 showed a stable 5 mm nodular opacity in the right lower lung lobe, adjacent to the diagram. Bilateral adrenal masses average density measured less than 0 Hounsfield units, consistent with benign etio     Anxiety      Anxiety      Arthritis      Borderline personality  disorder (H)      Cancer (H)      COPD (chronic obstructive pulmonary disease) (H)      Depression      Depressive disorder      Hyperlipidemia      Hypertension      Hypertension      Hyponatremia      Hypothyroidism      Malignant neoplasm of thyroid gland (H)     Chen Null MD  She had R hemithyroidectomy for a right neck tumor in 1994. According to the patient, the tumor was benign. She is not sure whether or not the tumor belonged to the thyroid gland. The surgery was done here at the [facility]. In January 2011, she was diagnosed with kidney stones. She was found to have an elevated calcium level of 11.7, with a PTH level of 368. Stone an     Primary hyperparathyroidism (H)            PTSD (post-traumatic stress disorder)      Pulmonary embolism with infarction (H) 1/12/2021     Recurrent otitis media      Seizure disorder (H)      Stroke (H)      Vertigo           Medications:     Current Outpatient Medications   Medication     predniSONE (DELTASONE) 20 MG tablet     albuterol (PROAIR HFA) 108 (90 Base) MCG/ACT inhaler     Brivaracetam (BRIVIACT) 100 MG tablet     Calcium Citrate-Vitamin D (CALCIUM + D PO)     carBAMazepine (TEGRETOL) 200 MG tablet     Cyanocobalamin (VITAMIN B12) 1000 MCG TBCR     DULoxetine (CYMBALTA) 60 MG capsule     EPINEPHrine (ADRENACLICK JR) 0.15 MG/0.15ML injection 2-pack     EPINEPHrine (ANY BX GENERIC EQUIV) 0.3 MG/0.3ML injection 2-pack     esomeprazole (NEXIUM) 40 MG DR capsule     fexofenadine (ALLEGRA) 180 MG tablet     fluticasone (FLONASE) 50 MCG/ACT nasal spray     levothyroxine (SYNTHROID/LEVOTHROID) 300 MCG tablet     losartan (COZAAR) 25 MG tablet     meclizine (ANTIVERT) 25 MG tablet     multivitamin with iron (CHROMAGEN) TABS half-tab     nicotine (NICOTROL) 10 MG inhaler     Nutritional Supplements (NUTRITIONAL SHAKE HIGH PROTEIN) LIQD     olopatadine (PATADAY) 0.2 % ophthalmic solution     oxybutynin ER (DITROPAN XL) 10 MG 24 hr tablet     Polyethylene  Glycol POWD     QUEtiapine 150 MG TABS     rosuvastatin (CRESTOR) 20 MG tablet     salmeterol (SEREVENT) 50 MCG/ACT inhaler     SENNA-TIME 8.6 MG tablet     SUMAtriptan (IMITREX) 50 MG tablet     tiZANidine (ZANAFLEX) 4 MG tablet     topiramate (TOPAMAX) 50 MG tablet     varenicline (CHANTIX) 1 MG tablet     Vibegron (GEMTESA) 75 MG TABS tablet     Vitamin D-Vitamin K (VITAMIN K2-VITAMIN D3)  MCG-UNIT CAPS     vitamin D3 (CHOLECALCIFEROL) 50 mcg (2000 units) tablet     warfarin ANTICOAGULANT (COUMADIN) 5 MG tablet     Current Facility-Administered Medications   Medication     denosumab (PROLIA) injection 60 mg       Social History :     Social History     Socioeconomic History     Marital status:      Spouse name: Not on file     Number of children: Not on file     Years of education: Not on file     Highest education level: Not on file   Occupational History     Not on file   Tobacco Use     Smoking status: Some Days     Packs/day: 0.25     Years: 35.00     Pack years: 8.75     Types: Cigarettes     Start date: 6/10/1974     Smokeless tobacco: Never     Tobacco comments:     smoking about 4 cig per day   Vaping Use     Vaping Use: Never used   Substance and Sexual Activity     Alcohol use: No     Drug use: No     Sexual activity: Not Currently   Other Topics Concern     Not on file   Social History Narrative     Not on file     Social Determinants of Health     Financial Resource Strain: Not on file   Food Insecurity: Not on file   Transportation Needs: Not on file   Physical Activity: Not on file   Stress: Not on file   Social Connections: Not on file   Intimate Partner Violence: Not on file   Housing Stability: Not on file          Family History :     Family History   Problem Relation Age of Onset     Chronic Obstructive Pulmonary Disease Mother      Other - See Comments Father         estranged     Skin Cancer Father      Lung Cancer Maternal Grandfather 76     Other - See Comments Brother          Missing since 1992     Alcoholism Brother      Diabetes Sister      Depression Sister      Alcoholism Sister      No Known Problems Sister         Half sister       Review of Systems  A 10 point comprehensive review of systems was negative except as noted.        Objective:     LMP  (LMP Unknown)   Telephone visit     Diagnostic tests:      Nuc Med Stress Test 06/2021:  SUMMARY:  1.  Subjectively positive regadenoson infusion.  2.  Negative regadenoson ECG for ischemia.  3.  Regadenoson nuclear imaging does not suggest any ischemia or prior scar.  4.  Preserved wall motion as mentioned above.  5.  No prior scan is available for comparison.    PFTs:     FVC  2.78 L (82%)  FEV1 2.25 L (85%)  FEV1/FVC 81  TLC 4.87 L (92%)  RV 1.71 L (84%)  DLCO 94%         Sleep study:      Sleep study done on 3/26/2015   showed decrease sleep time, AHI 1.8, per sleep physician, underestimated secondary to no stage REM sleep, significant hypoxia was note present.     Sleep study 2/3/2021  Severe obstructive sleep apnea was demonstrated, with an AHI of 91.4 events per hour of sleep time.  There was no position or sleep stage predominance.  Hypoventilation was not present.  Oxyhemoglobin saturation monitoring is consistent with nocturnal hypoxemia (9.2 minutes less than or equal to 88%) due to the cumulative effect of episodic desaturations.    IMAGES:     CT CHEST PULMONARY EMBOLISM W CONTRAST  LOCATION: [facility]  DATE/TIME: 8/31/2021 2:17 PM   INDICATION: Dyspnea, chronic, unclear etiology.  COMPARISON: None.  FINDINGS:  ANGIOGRAM CHEST: Pulmonary arteries are normal caliber and negative for pulmonary emboli. Thoracic aorta is negative for dissection or aneurysm.  LUNGS AND PLEURA: Minimal atelectasis or scarring. Otherwise, lungs are clear. No significant airway thickening or bronchiectasis. No pleural effusion or pneumothorax.  MEDIASTINUM/AXILLAE: No adenopathy. No pericardial effusion. Small hiatus  hernia.  CORONARY ARTERY CALCIFICATION: None.  UPPER ABDOMEN: Hepatic steatosis. Stable 2.5 cm left adrenal adenoma requiring no routine follow-up.  MUSCULOSKELETAL: Normal.  IMPRESSION:  1.  No findings to explain symptoms.  2.  No pulmonary embolism.  3.  Hepatic steatosis.  4.  Small hiatus hernia.     XR CHEST 2 VW  LOCATION: [facility]  DATE/TIME: 12/9/2021 10:34 AM  INDICATION: Chronic cough and left chest wall pain. History of COPD.  COMPARISON: CT chest 8/31/2021 and one view chest 9/9/2019  IMPRESSION: Heart size and vascularity are normal. Focal subpleural airspace opacities within the left mid lung and lower lobe suggesting pneumonia. No pneumothorax nor pleural effusion.  Follow-up radiograph should be suggested to ensure resolution.    CT ABDOMEN PELVIS W CONTRAST  DATE/TIME: 5/7/2022 1:16 PM  INDICATION:  Pelvic and perineal pain.  COMPARISON: CT abdomen 05/23/2013.  FINDINGS:   LOWER CHEST: Normal.  HEPATOBILIARY: No acute abnormality. A hypodense nodule suggesting hemangioma is stable in size measuring 1.8 cm posterior right hepatic dome series 3 image 27. Gallbladder appears unremarkable.  PANCREAS: Normal.  SPLEEN: No acute abnormality. Adjacent splenule is again noted.  ADRENAL GLANDS: Hypodense nodule is 2.8 cm right adrenal, previously 1.8 cm series 3 image 55. Normal left adrenal.  KIDNEYS/BLADDER: No hydronephrosis or urolithiasis. No acute abnormality. Bladder is unremarkable.  BOWEL: No obstruction. Normal appendix. No acute inflammatory change.  LYMPH NODES: Normal.  VASCULATURE: Unremarkable.  PELVIC ORGANS: Hysterectomy. No perineal abscess. No convincing acute abnormality.   MUSCULOSKELETAL: Height loss at L1, L2, and L5 vertebral bodies noted.  IMPRESSION:   1.  No acute abnormality is seen. No perineal or pelvic fluid collection identified.  2.  Larger size of a right adrenal nodule compared to 05/23/2013. This is technically indeterminate. This could be  further correlated with adrenal specific CT or MRI.  3.  Height loss noted at L1, L2, and L5 vertebral bodies.

## 2023-03-14 ENCOUNTER — MEDICAL CORRESPONDENCE (OUTPATIENT)
Dept: HEALTH INFORMATION MANAGEMENT | Facility: CLINIC | Age: 63
End: 2023-03-14

## 2023-03-14 ENCOUNTER — ANTICOAGULATION THERAPY VISIT (OUTPATIENT)
Dept: ANTICOAGULATION | Facility: CLINIC | Age: 63
End: 2023-03-14
Payer: COMMERCIAL

## 2023-03-14 DIAGNOSIS — Z79.01 LONG TERM (CURRENT) USE OF ANTICOAGULANTS: ICD-10-CM

## 2023-03-14 DIAGNOSIS — I26.99 PULMONARY EMBOLISM WITH INFARCTION (H): Primary | ICD-10-CM

## 2023-03-14 LAB — INR (EXTERNAL): 2.9 (ref 0.9–1.1)

## 2023-03-14 RX ORDER — VARENICLINE TARTRATE 1 MG/1
TABLET, FILM COATED ORAL
Qty: 56 TABLET | Refills: 2 | Status: SHIPPED | OUTPATIENT
Start: 2023-03-14 | End: 2023-06-05

## 2023-03-14 NOTE — PROGRESS NOTES
ANTICOAGULATION MANAGEMENT     Valentina Olmedo 63 year old female is on warfarin with therapeutic INR result. (Goal INR 2.0-3.0)    Recent labs: (last 7 days)     03/14/23  1300   INR 2.9*       ASSESSMENT       Source(s): Chart Review and Home Care/Facility Nurse       Warfarin doses taken: Warfarin taken as instructed    Diet: No new diet changes identified    New illness, injury, or hospitalization: Yes: Was seen by pulmonary yesterday for worsening cough and chest tightness. Treating per below.    Following with ENT, FP for dizziness and vision changes. Referral made to Ophthalmology.    Medication/supplement changes: 40mg prednisone for 5 days starting today. This medication may increase or decrease INR.    Signs or symptoms of bleeding or clotting: No    Previous INR: Supratherapeutic    Additional findings: None         PLAN     Recommended plan for temporary change(s) affecting INR     Dosing Instructions: Continue your current warfarin dose with next INR in 3 days       Summary  As of 3/14/2023    Full warfarin instructions:  12.5 mg every Sun, Thu; 10 mg all other days   Next INR check:  3/17/2023             Telephone call with [place] home care nurse who verbalizes understanding and agrees to plan    Orders given to  Homecare nurse/facility to recheck    Education provided:     Goal range and lab monitoring: goal range and significance of current result     Potential interaction between prednisone and warfarin    Plan made per [facility] anticoagulation protocol    Arden Shipman RN  Anticoagulation Clinic  3/14/2023    _______________________________________________________________________     Anticoagulation Episode Summary     Current INR goal:  2.0-3.0   TTR:  43.3 % (1 y)   Target end date:  Indefinite   Send INR reminders to:  [facility]    Indications    History of pulmonary embolism (Resolved) [Z86.711]  Pulmonary embolism with infarction (H) [I26.99]  Long term (current) use of anticoagulants  [Z79.01]           Comments:           Anticoagulation Care Providers     Provider Role Specialty Phone number    Promise Vanegas MD Referring Family Medicine [phone]    Donald Rooney MD Referring Internal Medicine [phone]    Jose Maria Morin MD Referring Family Medicine [phone]

## 2023-03-15 DIAGNOSIS — R63.5 WEIGHT GAIN: ICD-10-CM

## 2023-03-15 NOTE — TELEPHONE ENCOUNTER
"Date of Last Office Visit: 22  Date of Next Office Visit: 23  No shows since last visit: 0  Cancellations since last visit: 22 (PT)    Medication requested: topiramate 50 mg Date last ordered: 10/4/22 Qty: 90 Refills: 1     Review of [place] ?: NA    Lapse in medication adherence greater than 5 days?: no  If yes, call patient and gather details: NA  Medication refill request verified as identical to current order?: yes  Result of Last DAM, VPA, Li+ Level, CBC, or Carbamazepine Level (at or since last visit): N/A    Last visit treatment plan:   Medical Decision Makin. Schizoaffective disorder, depressive type (H)  \"I am depressed, do not get out of bed, hard time of year around the holidays. I feel overmedicated.\"  Pt requests dose reduction of meds.  Plan:   discontinue hydroxyzine   Discontinue prazosin  decrease Seroquel 150 mg nightly,   Continue Cymbalta 120 mg daily.  Pt to tell neurologist Dr. Puente is working at decreasing med doses.    Ask neurologist if she can decrease any med doses or check levels of AED's.  2. PTSD (post-traumatic stress disorder)   C/o increased anxiety/depression.  Plan:   Refer to therapy, called behavioral health access.  2/15/23 therapy appt w/ Veronica TAMAYO. On cancellation wait-list.  3. CHRIS (obstructive sleep apnea)  Inconsistent adherence.  Plan:   Pt and  enc to give reward for using (ride in car).  4. High risk medication use  DISCUS score = 0 (done 10/05/21).  Topamax 50 mg nightly to target appetite and weight.  5. Epilepsy  Managed by Vikki Benitez MD, [place] [facility]- appt 22.   Return in about 2 mos.-in-person appointment.         []Medication refilled per  Medication Refill in Ambulatory Care  policy.  [x]Medication unable to be refilled by RN due to criteria not met as indicated below:    []Eligibility - not seen in the last year   []Supervision - no future appointment   []Compliance - no shows, cancellations or lapse in " therapy   []Verification - order discrepancy   []Controlled medication   []Medication not included in policy   [x]90-day supply request   []Other

## 2023-03-16 ENCOUNTER — MEDICAL CORRESPONDENCE (OUTPATIENT)
Dept: HEALTH INFORMATION MANAGEMENT | Facility: CLINIC | Age: 63
End: 2023-03-16

## 2023-03-17 ENCOUNTER — ANTICOAGULATION THERAPY VISIT (OUTPATIENT)
Dept: ANTICOAGULATION | Facility: CLINIC | Age: 63
End: 2023-03-17
Payer: COMMERCIAL

## 2023-03-17 DIAGNOSIS — Z79.01 LONG TERM (CURRENT) USE OF ANTICOAGULANTS: ICD-10-CM

## 2023-03-17 DIAGNOSIS — I26.99 PULMONARY EMBOLISM WITH INFARCTION (H): Primary | ICD-10-CM

## 2023-03-17 LAB — INR (EXTERNAL): 3.2 (ref 0.9–1.1)

## 2023-03-17 NOTE — PROGRESS NOTES
ANTICOAGULATION MANAGEMENT     Valentina Olmedo 63 year old female is on warfarin with supratherapeutic INR result. (Goal INR 2.0-3.0)    Recent labs: (last 7 days)     03/17/23  1726   INR 3.2*       ASSESSMENT       Source(s): Chart Review and Home Care/Facility Nurse       Warfarin doses taken: Warfarin taken as instructed    Diet: No new diet changes identified. Patient reports being back at her baseline of green intake.    New illness, injury, or hospitalization: No    Medication/supplement changes: Patient is on prednisone through 3/18/23    Signs or symptoms of bleeding or clotting: No    Previous INR: Therapeutic last visit; previously outside of goal range    Additional findings: None         PLAN     Recommended plan for temporary change(s) affecting INR     Dosing Instructions: decrease your warfarin dose (7% change) with next INR in 1 week       Summary  As of 3/17/2023    Full warfarin instructions:  10 mg every day   Next INR check:  3/24/2023             Telephone call with [facility] home care nurse who agrees to plan and repeated back plan correctly    Orders given to  Homecare nurse/facility to recheck    Education provided:     Please call back if any changes to your diet, medications or how you've been taking warfarin    Symptom monitoring: monitoring for bleeding signs and symptoms and monitoring for clotting signs and symptoms    Contact [phone]  with any changes, questions or concerns.     Plan made with [facility] Pharmacist Paula Cheng, RN  Anticoagulation Clinic  3/17/2023    _______________________________________________________________________     Anticoagulation Episode Summary     Current INR goal:  2.0-3.0   TTR:  43.5 % (1 y)   Target end date:  Indefinite   Send INR reminders to:  [facility]    Indications    History of pulmonary embolism (Resolved) [Z86.711]  Pulmonary embolism with infarction (H) [I26.99]  Long term (current) use of anticoagulants [Z79.01]            Comments:           Anticoagulation Care Providers     Provider Role Specialty Phone number    Promise Vanegas MD Referring Family Medicine [phone]    Donald Rooney MD Referring Internal Medicine [phone]    Jose Maria Morin MD Referring Family Medicine [phone]

## 2023-03-20 ENCOUNTER — MEDICAL CORRESPONDENCE (OUTPATIENT)
Dept: HEALTH INFORMATION MANAGEMENT | Facility: CLINIC | Age: 63
End: 2023-03-20

## 2023-03-21 DIAGNOSIS — J30.2 SEASONAL ALLERGIC RHINITIS, UNSPECIFIED TRIGGER: ICD-10-CM

## 2023-03-21 NOTE — TELEPHONE ENCOUNTER
Medication Request  Medication name: fluticasone (FLONASE) 50 MCG/ACT nasal spray  Requested Pharmacy: [facility]  When was patient last seen for this?:  3/9/23  Patient offered appointment:  No  Okay to leave a detailed message: yes

## 2023-03-22 ENCOUNTER — MEDICAL CORRESPONDENCE (OUTPATIENT)
Dept: HEALTH INFORMATION MANAGEMENT | Facility: CLINIC | Age: 63
End: 2023-03-22

## 2023-03-22 RX ORDER — FLUTICASONE PROPIONATE 50 MCG
SPRAY, SUSPENSION (ML) NASAL
Qty: 16 G | Refills: 11 | Status: SHIPPED | OUTPATIENT
Start: 2023-03-22 | End: 2024-05-15

## 2023-03-22 NOTE — TELEPHONE ENCOUNTER
"Last Written Prescription Date:  4/1/2022  Last Fill Quantity: 16 g,  # refills: 4   Last office visit provider:  3/9/2023     Requested Prescriptions   Pending Prescriptions Disp Refills     fluticasone (FLONASE) 50 MCG/ACT nasal spray 16 g 4       Nasal Allergy Protocol Passed - 3/22/2023  9:54 AM        Passed - Patient is age 12 or older        Passed - Recent (12 mo) or future (30 days) visit within the authorizing provider's specialty     Patient has had an office visit with the authorizing provider or a provider within the authorizing providers department within the previous 12 mos or has a future within next 30 days. See \"Patient Info\" tab in inbasket, or \"Choose Columns\" in Meds & Orders section of the refill encounter.              Passed - Medication is active on med list             LEONID STRAUSS RN 03/22/23 9:57 AM  "

## 2023-03-23 ENCOUNTER — TELEPHONE (OUTPATIENT)
Dept: PSYCHIATRY | Facility: CLINIC | Age: 63
End: 2023-03-23

## 2023-03-23 ENCOUNTER — TRANSFERRED RECORDS (OUTPATIENT)
Dept: HEALTH INFORMATION MANAGEMENT | Facility: CLINIC | Age: 63
End: 2023-03-23

## 2023-03-23 ENCOUNTER — TELEPHONE (OUTPATIENT)
Dept: FAMILY MEDICINE | Facility: CLINIC | Age: 63
End: 2023-03-23
Payer: COMMERCIAL

## 2023-03-23 DIAGNOSIS — I26.99 PULMONARY EMBOLISM WITH INFARCTION (H): Primary | ICD-10-CM

## 2023-03-23 RX ORDER — TOPIRAMATE 50 MG/1
50 TABLET, FILM COATED ORAL AT BEDTIME
Qty: 90 TABLET | Refills: 0 | Status: SHIPPED | OUTPATIENT
Start: 2023-03-23 | End: 2023-06-06

## 2023-03-23 NOTE — TELEPHONE ENCOUNTER
General Call    Contacts       Type Contact Phone/Fax    03/23/2023 11:01 AM CDT Phone (Incoming) Ana (Home Care) [phone]     [facility]        Reason for Call:  Referral Request    What are your questions or concerns:  Ana, PT with [facility], calling to request PCP place order for outpatient pulmonary rehab. Homecare states patient will be ready to resume outpatient rehab in the next couple weeks. Homecare is requesting a call when order is placed so they can help patient transition and get scheduled.    Secure voicemail: [phone]

## 2023-03-23 NOTE — TELEPHONE ENCOUNTER
1) RN reviewed 3/23/23 Telephone encounter message: Reason for call: Received call from Ramiro ([facility]) for a  Medication refill Name of the medication requested: Topirmate  If this is a refill request, has the caller requested the refill from the pharmacy already? Yes  Name of the pharmacy and phone number for the current request: [facility] [phone]   Phone number to reach patient:  [phone] Best Time:  Anytime Can we leave a detailed message on this number? Yes    2) RN routing original refill request encounter to provider again.     Rhonda Coats RN on 3/23/2023 at 3:38 PM

## 2023-03-23 NOTE — TELEPHONE ENCOUNTER
Reason for call: Received call from Ramiro ([facility]) for a  Medication refill    If this is a refill request, has the caller requested the refill from the pharmacy already? Yes    Name of the pharmacy and phone number for the current request: [facility] [phone]    Name of the medication requested: [facility]    Phone number to reach patient:  [phone]    Best Time:  Anytime    Can we leave a detailed message on this number? Yes

## 2023-03-24 ENCOUNTER — MEDICAL CORRESPONDENCE (OUTPATIENT)
Dept: HEALTH INFORMATION MANAGEMENT | Facility: CLINIC | Age: 63
End: 2023-03-24

## 2023-03-24 ENCOUNTER — ANTICOAGULATION THERAPY VISIT (OUTPATIENT)
Dept: ANTICOAGULATION | Facility: CLINIC | Age: 63
End: 2023-03-24
Payer: COMMERCIAL

## 2023-03-24 DIAGNOSIS — Z79.01 LONG TERM (CURRENT) USE OF ANTICOAGULANTS: ICD-10-CM

## 2023-03-24 DIAGNOSIS — I26.99 PULMONARY EMBOLISM WITH INFARCTION (H): Primary | ICD-10-CM

## 2023-03-24 LAB — INR (EXTERNAL): 3.5 (ref 0.9–1.1)

## 2023-03-24 NOTE — PROGRESS NOTES
ANTICOAGULATION MANAGEMENT     Valentina Olmedo 63 year old female is on warfarin with supratherapeutic INR result. (Goal INR 2.0-3.0)    Recent labs: (last 7 days)     03/24/23  1404   INR 3.5*       ASSESSMENT       Source(s): Chart Review and Home Care/Facility Nurse       Warfarin doses taken: Warfarin taken as instructed    Diet: No new diet changes identified    New illness, injury, or hospitalization: Yes: recently treated with course of prednisone through 03/18 for chest tightness and cough. This was helpful but she does have some chest tightness again today.    Medication/supplement changes: None noted    Signs or symptoms of bleeding or clotting: No    Previous INR: Supratherapeutic    Additional findings: None         PLAN     Recommended plan for no diet, medication or health factor changes affecting INR     Dosing Instructions: hold dose then decrease your warfarin dose (7% change) with next INR in 6 days       Summary  As of 3/24/2023    Full warfarin instructions:  3/24: 5 mg; Otherwise 7.5 mg every Tue, Fri; 10 mg all other days   Next INR check:  3/30/2023             Telephone call with [facility] home care nurse who verbalizes understanding and agrees to plan    Orders given to  Homecare nurse/facility to recheck    Education provided:     Goal range and lab monitoring: goal range and significance of current result    Plan made per [facility] anticoagulation protocol    Arden Shipman RN  Anticoagulation Clinic  3/24/2023    _______________________________________________________________________     Anticoagulation Episode Summary     Current INR goal:  2.0-3.0   TTR:  43.6 % (1 y)   Target end date:  Indefinite   Send INR reminders to:  [facility]    Indications    History of pulmonary embolism (Resolved) [Z86.711]  Pulmonary embolism with infarction (H) [I26.99]  Long term (current) use of anticoagulants [Z79.01]           Comments:           Anticoagulation Care Providers     Provider Role Specialty  Phone number    Promise Vanegas MD Referring Family Medicine [phone]    Donald Rooney MD Referring Internal Medicine [phone]    Jose Maria Morin MD Referring Family Medicine [phone]

## 2023-03-27 ENCOUNTER — MEDICAL CORRESPONDENCE (OUTPATIENT)
Dept: HEALTH INFORMATION MANAGEMENT | Facility: CLINIC | Age: 63
End: 2023-03-27

## 2023-03-28 ENCOUNTER — TELEPHONE (OUTPATIENT)
Dept: FAMILY MEDICINE | Facility: CLINIC | Age: 63
End: 2023-03-28
Payer: COMMERCIAL

## 2023-03-28 DIAGNOSIS — J44.9 CHRONIC OBSTRUCTIVE PULMONARY DISEASE, UNSPECIFIED COPD TYPE (H): ICD-10-CM

## 2023-03-28 DIAGNOSIS — R53.81 PHYSICAL DECONDITIONING: Primary | ICD-10-CM

## 2023-03-28 NOTE — PROGRESS NOTES
Home care nurse K[facility] calls requesting a verbal OK to recheck INR on 3/29/23 instead of 3/30/23 due to staffing issues, writer gave Katelyn verbal OK.

## 2023-03-28 NOTE — TELEPHONE ENCOUNTER
General Call    Contacts       Type Contact Phone/Fax    03/28/2023 12:16 PM CDT Phone (Incoming) [facility] - Occupational Therapy (Other) [phone]     Needs orders Faxed to clinic - outpatient pulminary rehab  FAX: [phone]        Reason for Call:  Follow-up and Faxed orders    What are your questions or concerns:  Needs orders Faxed to clinic - outpatient pulminary rehab  FAX: [phone]    Would like a follow-up when they are faxed    Date of last appointment with provider: N/A    Could we send this information to you in [employer] or would you prefer to receive a phone call?:   Patient would prefer a phone call   Okay to leave a detailed message?: Yes at Other phone number:  [phone]

## 2023-03-29 ENCOUNTER — ANTICOAGULATION THERAPY VISIT (OUTPATIENT)
Dept: ANTICOAGULATION | Facility: CLINIC | Age: 63
End: 2023-03-29
Payer: COMMERCIAL

## 2023-03-29 DIAGNOSIS — Z79.01 LONG TERM (CURRENT) USE OF ANTICOAGULANTS: ICD-10-CM

## 2023-03-29 DIAGNOSIS — I26.99 PULMONARY EMBOLISM WITH INFARCTION (H): Primary | ICD-10-CM

## 2023-03-29 LAB — INR (EXTERNAL): 2.5 (ref 0.9–1.1)

## 2023-03-29 NOTE — PROGRESS NOTES
ANTICOAGULATION MANAGEMENT     Valentina Olmedo 63 year old female is on warfarin with therapeutic INR result. (Goal INR 2.0-3.0)    Recent labs: (last 7 days)     03/29/23  1413   INR 2.5*       ASSESSMENT       Source(s): Chart Review and Home Care/Facility Nurse       Warfarin doses taken: Warfarin taken as instructed    Diet: No new diet changes identified    New illness, injury, or hospitalization: No    Medication/supplement changes: Started terbinifine x 6 weeks for toenail fungus, no interaction expected with warfarin    Signs or symptoms of bleeding or clotting: No    Previous INR: Supratherapeutic    Additional findings: None         PLAN     Recommended plan for ongoing change(s) affecting INR     Dosing Instructions: Continue your current warfarin dose with next INR in 1 week       Summary  As of 3/29/2023    Full warfarin instructions:  7.5 mg every Tue, Fri; 10 mg all other days   Next INR check:  4/5/2023             Telephone call with [facility] care nurse who verbalizes understanding and agrees to plan    Orders given to  Homecare nurse/facility to recheck    Education provided:     Interaction IS NOT anticipated between warfarin and terbinifine    Contact [phone]  with any changes, questions or concerns.     Plan made per [facility] anticoagulation protocol    Fanta Smith RN  Anticoagulation Clinic  3/29/2023    _______________________________________________________________________     Anticoagulation Episode Summary     Current INR goal:  2.0-3.0   TTR:  44.1 % (1 y)   Target end date:  Indefinite   Send INR reminders to:  [facility]    Indications    History of pulmonary embolism (Resolved) [Z86.711]  Pulmonary embolism with infarction (H) [I26.99]  Long term (current) use of anticoagulants [Z79.01]           Comments:           Anticoagulation Care Providers     Provider Role Specialty Phone number    Promise Vanegas MD Referring Family Medicine [phone]    Donald Rooney,  MD Referring Internal Medicine [phone]    Jose Maria Morin MD Referring Family Medicine [phone]

## 2023-03-31 ENCOUNTER — MEDICAL CORRESPONDENCE (OUTPATIENT)
Dept: HEALTH INFORMATION MANAGEMENT | Facility: CLINIC | Age: 63
End: 2023-03-31

## 2023-04-03 ENCOUNTER — MEDICAL CORRESPONDENCE (OUTPATIENT)
Dept: HEALTH INFORMATION MANAGEMENT | Facility: CLINIC | Age: 63
End: 2023-04-03
Payer: COMMERCIAL

## 2023-04-04 NOTE — TELEPHONE ENCOUNTER
If provider agrees to orders, please fax to pulmonary rehab [phone] and additional copy to [facility] [phone].

## 2023-04-04 NOTE — TELEPHONE ENCOUNTER
Previous pulmonary rehab referral is now closed. New pulm rehab order prepped below, please review and sign if agree (verbal approval given in 3/23 telephone encounter).

## 2023-04-05 ENCOUNTER — TELEPHONE (OUTPATIENT)
Dept: FAMILY MEDICINE | Facility: CLINIC | Age: 63
End: 2023-04-05
Payer: COMMERCIAL

## 2023-04-05 ENCOUNTER — ANTICOAGULATION THERAPY VISIT (OUTPATIENT)
Dept: ANTICOAGULATION | Facility: CLINIC | Age: 63
End: 2023-04-05
Payer: COMMERCIAL

## 2023-04-05 DIAGNOSIS — I26.99 PULMONARY EMBOLISM WITH INFARCTION (H): Primary | ICD-10-CM

## 2023-04-05 DIAGNOSIS — G47.33 OSA (OBSTRUCTIVE SLEEP APNEA): Primary | ICD-10-CM

## 2023-04-05 DIAGNOSIS — Z79.01 LONG TERM (CURRENT) USE OF ANTICOAGULANTS: ICD-10-CM

## 2023-04-05 LAB — INR (EXTERNAL): 2.7 (ref 0.9–1.1)

## 2023-04-05 NOTE — PROGRESS NOTES
ANTICOAGULATION MANAGEMENT     Valentina Olmedo 63 year old female is on warfarin with therapeutic INR result. (Goal INR 2.0-3.0)    Recent labs: (last 7 days)     04/05/23  0000   INR 2.7*       ASSESSMENT       Source(s): Chart Review and Home Care/Facility Nurse       Warfarin doses taken: Warfarin taken as instructed    Diet: No new diet changes identified    New illness, injury, or hospitalization: No    Medication/supplement changes: None noted    Signs or symptoms of bleeding or clotting: No    Previous INR: Therapeutic last visit; previously outside of goal range    Additional findings: Home visit every Tuesday moving forward.         PLAN     Recommended plan for no diet, medication or health factor changes affecting INR     Dosing Instructions: Continue your current warfarin dose with next INR in 1-2 weeks       Summary  As of 4/5/2023    Full warfarin instructions:  7.5 mg every Tue, Fri; 10 mg all other days   Next INR check:  4/18/2023             Telephone call with [facility] care nurse who verbalizes understanding and agrees to plan    Patient to recheck with home meter ~ will check on 4/18/2023    Education provided:     Goal range and lab monitoring: Importance of therapeutic range and Importance of following up at instructed interval    Plan made per [facility] anticoagulation protocol    Zari Maria RN  Anticoagulation Clinic  4/5/2023    _______________________________________________________________________     Anticoagulation Episode Summary     Current INR goal:  2.0-3.0   TTR:  45.2 % (1 y)   Target end date:  Indefinite   Send INR reminders to:  [facility]    Indications    History of pulmonary embolism (Resolved) [Z86.711]  Pulmonary embolism with infarction (H) [I26.99]  Long term (current) use of anticoagulants [Z79.01]           Comments:           Anticoagulation Care Providers     Provider Role Specialty Phone number    Promise Vanegas MD Referring Family Medicine [phone]     Donald Rooney MD Referring Internal Medicine [phone]    Jsoe Maria Morin MD Referring Family Medicine [phone]           Render Note In Bullet Format When Appropriate: No Post-Care Instructions: Aftercare Cryosurgery\\nYou have just had cryotherapy for the treatment of a pre-cancerous. You can expect the pain to rapidly decrease over the next 45 minutes. The area treated will initially turn red and over the next 72 hours turn brown to black. A scab will form that will peel off by itself within 5 to 7 days. A blister or reddish-purple blood blister may form where the area has been treated. When the scab forms, you can apply Vaseline or Scar Recovery Gel twice a day to the wound. This product will improve the healing of the wound, decreasing the appearance of red or pink pigment changes in the wound. The gel has also been shown to significantly decrease itching while the wound heals. You can purchase the Scar Recovery Gel at our office.\\n*Can I wash or use makeup? Yes\\n*Should I break the blister should one form? *If the blister is bothersome, you may break the blister with a clean needle if the lesion is on the body. However, we do not recommend doing this for lesions on the face. Keep the area dry and as clean as possible in order to prevent infection. Natural skin is the best protection to prevent infection.\\n*Will this leave a scar? It could, but it is very unlikely. However, it may leave a slight discoloration, which should be temporary.\\n*What if the skin growth doesn't go away after this has healed? The providers treated this area believing it did not have cancerous roots and was strictly limited to the surface of the skin as a pre-cancerous or benign growth would be. The growth may not disappear or it may return over a period of time. You need to have this area checked again at your follow-up appointment to ensure that it does not need further treatment or that it has resolved. Duration Of Freeze Thaw-Cycle (Seconds): 0 Show Applicator Variable?: Yes Consent: The patient's consent was obtained including but not limited to risks of crusting, scabbing, blistering, scarring, darker or lighter pigmentary change, recurrence, incomplete removal and infection. Detail Level: Detailed

## 2023-04-05 NOTE — TELEPHONE ENCOUNTER
Forms/Letter Request    Type of form/letter: Addendum to Notes Forms + last 2-3 o.v. notes.    Have you been seen for this request: Yes    Do we have the form/letter: Yes, in Dr. Morin mail box behind the .    Who is the form from? [facility]    Where did/will the form come from? form was faxed in    When is form/letter needed by: ASAP    How would you like the form/letter returned: Fax : to BOTH    [phone]  &  [phone]    Patient Notified form requests are processed in 3-5 business days:Yes, they requested expedited response since they sent it over on 04/03/23 already and patient's insurance is requiring these forms before they can send to the patient.

## 2023-04-06 NOTE — CONFIDENTIAL NOTE
These were recommended by physical therapy/skilled nursing.  I do not have any documentation to support the prescription.  Paperwork on Duy's chair.  We may need to have the patient come into clinic to review need.

## 2023-04-07 ENCOUNTER — TELEPHONE (OUTPATIENT)
Dept: NURSING | Facility: CLINIC | Age: 63
End: 2023-04-07
Payer: COMMERCIAL

## 2023-04-07 ENCOUNTER — MEDICAL CORRESPONDENCE (OUTPATIENT)
Dept: HEALTH INFORMATION MANAGEMENT | Facility: CLINIC | Age: 63
End: 2023-04-07
Payer: COMMERCIAL

## 2023-04-07 NOTE — TELEPHONE ENCOUNTER
Xu with [facility] states that the 2-3 O.V. notes don't need to have any information supporting the DME supplies, they are just required documentation they need on their end. He states that he needs the note addendum forms and O.V. notes by the end of the day as it is the last working day of the week and the prescription is already in process, its just additional information that is required.    Please advise.

## 2023-04-07 NOTE — TELEPHONE ENCOUNTER
Left message to call back for: patient x1  Information to relay to patient: Called patient earlier to schedule appointment for DME supplies, appointment is not needed, there was a misunderstanding between us and [facility] care with their document request. She can disregard the request to schedule.

## 2023-04-07 NOTE — TELEPHONE ENCOUNTER
Left message to call back for: patient x1  Information to relay to patient: See below and help schedule.   Same-day okay week of 04/17/23 or later.

## 2023-04-07 NOTE — TELEPHONE ENCOUNTER
Duy Jeffrey, RN  [facility] Scheduling Registration Pool 19 hours ago (1:01 PM)     Please call patient and set up appointment with Jose Maria Morin MD re DME supplies and need for sufficient documentation from provider for coverage            Jose Maria Morin MD 19 hours ago (12:34 PM)        These were recommended by physical therapy/skilled nursing.  I do not have any documentation to support the prescription.  Paperwork on Duy's chair.  We may need to have the patient come into clinic to review need.

## 2023-04-12 DIAGNOSIS — F25.1 SCHIZOAFFECTIVE DISORDER, DEPRESSIVE TYPE (H): ICD-10-CM

## 2023-04-12 NOTE — TELEPHONE ENCOUNTER
"Date of Last Office Visit: 2022  Date of Next Office Visit: 23  No shows since last visit: 0  Cancellations since last visit: 0    Medication requested: QUEtiapine 150 MG TABS Date last ordered: 2022 Qty: 90 Refills: 1     Review of [place] ?: No  Medication last filled date: 3/15/23 Qty filled: 28      Lapse in medication adherence greater than 5 days?: no  If yes, call patient and gather details: A  Medication refill request verified as identical to current order?: yes  Result of Last DAM, VPA, Li+ Level, CBC, or Carbamazepine Level (at or since last visit): N/A    Last visit treatment plan:   Medical Decision Makin. Schizoaffective disorder, depressive type (H)  \"I am depressed, do not get out of bed, hard time of year around the holidays. I feel overmedicated.\"  Pt requests dose reduction of meds.  Plan:   discontinue hydroxyzine   Discontinue prazosin  decrease Seroquel 150 mg nightly,   Continue Cymbalta 120 mg daily.  Pt to tell neurologist Dr. Puente is working at decreasing med doses.    Ask neurologist if she can decrease any med doses or check levels of AED's.           2. PTSD (post-traumatic stress disorder)   C/o increased anxiety/depression.  Plan:   Refer to therapy, called behavioral health access.  2/15/23 therapy appt w/ Veronica TAMAYO. On cancellation wait-list.        3. CHRIS (obstructive sleep apnea)  Inconsistent adherence.  Plan:   Pt and  enc to give reward for using (ride in car).        4. High risk medication use  DISCUS score = 0 (done 10/05/21).  Topamax 50 mg nightly to target appetite and weight.        5. Epilepsy  Managed by Vikki Benitez MD, [place] [facility]- appt 22.        Return in about 2 mos.-in-person appointment      []Medication refilled per  Medication Refill in Ambulatory Care  policy.  [x]Medication unable to be refilled by RN due to criteria not met as indicated below:    []Eligibility - not seen in the last " year   []Supervision - no future appointment   []Compliance - no shows, cancellations or lapse in therapy   []Verification - order discrepancy   []Controlled medication   []Medication not included in policy   []90-day supply request   [x]Other out of scope of CMA

## 2023-04-14 ENCOUNTER — TELEPHONE (OUTPATIENT)
Dept: FAMILY MEDICINE | Facility: CLINIC | Age: 63
End: 2023-04-14
Payer: COMMERCIAL

## 2023-04-14 ENCOUNTER — MEDICAL CORRESPONDENCE (OUTPATIENT)
Dept: HEALTH INFORMATION MANAGEMENT | Facility: CLINIC | Age: 63
End: 2023-04-14
Payer: COMMERCIAL

## 2023-04-14 NOTE — TELEPHONE ENCOUNTER
Home Health Care    Reason for call:  Verbal Orders    Orders are needed for this patient.  OT: Speech therapy extension, 1/week for 6 weeks    Pt Provider: Dr Morin    Phone Number Homecare Nurse can be reached at: [phone]    Can we leave a detailed message on this number? YES

## 2023-04-15 ENCOUNTER — MEDICAL CORRESPONDENCE (OUTPATIENT)
Dept: HEALTH INFORMATION MANAGEMENT | Facility: CLINIC | Age: 63
End: 2023-04-15

## 2023-04-18 ENCOUNTER — MEDICAL CORRESPONDENCE (OUTPATIENT)
Dept: HEALTH INFORMATION MANAGEMENT | Facility: CLINIC | Age: 63
End: 2023-04-18
Payer: COMMERCIAL

## 2023-04-18 ENCOUNTER — ANTICOAGULATION THERAPY VISIT (OUTPATIENT)
Dept: ANTICOAGULATION | Facility: CLINIC | Age: 63
End: 2023-04-18
Payer: COMMERCIAL

## 2023-04-18 DIAGNOSIS — I26.99 PULMONARY EMBOLISM WITH INFARCTION (H): Primary | ICD-10-CM

## 2023-04-18 DIAGNOSIS — Z79.01 LONG TERM (CURRENT) USE OF ANTICOAGULANTS: ICD-10-CM

## 2023-04-18 LAB — INR (EXTERNAL): 2 (ref 0.9–1.1)

## 2023-04-18 RX ORDER — QUETIAPINE FUMARATE 150 MG/1
150 TABLET, FILM COATED ORAL AT BEDTIME
Qty: 90 TABLET | Refills: 3 | Status: SHIPPED | OUTPATIENT
Start: 2023-04-18 | End: 2023-07-25

## 2023-04-18 NOTE — PROGRESS NOTES
ANTICOAGULATION MANAGEMENT     Valentina Olmedo 63 year old female is on warfarin with therapeutic INR result. (Goal INR 2.0-3.0)    Recent labs: (last 7 days)     04/18/23  1358   INR 2.0*       ASSESSMENT       Source(s): Chart Review and Home Care/Facility Nurse       Warfarin doses taken: Warfarin taken as instructed    Diet: No new diet changes identified    Medication/supplement changes: None noted    New illness, injury, or hospitalization: No    Signs or symptoms of bleeding or clotting: No    Previous INR: Therapeutic last 2(+) visits    Additional findings: None         PLAN     Recommended plan for no diet, medication or health factor changes affecting INR     Dosing Instructions: Continue your current warfarin dose with next INR in 2 weeks       Summary  As of 4/18/2023    Full warfarin instructions:  7.5 mg every Tue, Fri; 10 mg all other days   Next INR check:  5/2/2023             Telephone call with [facility] care nurse who agrees to plan and repeated back plan correctly    Orders given to  Homecare nurse/facility to recheck    Education provided:     Please call back if any changes to your diet, medications or how you've been taking warfarin    Plan made per [facility] anticoagulation protocol    [facility], RN  Anticoagulation Clinic  4/18/2023    _______________________________________________________________________     Anticoagulation Episode Summary     Current INR goal:  2.0-3.0   TTR:  48.8 % (1 y)   Target end date:  Indefinite   Send INR reminders to:  [facility]    Indications    History of pulmonary embolism (Resolved) [Z86.711]  Pulmonary embolism with infarction (H) [I26.99]  Long term (current) use of anticoagulants [Z79.01]           Comments:           Anticoagulation Care Providers     Provider Role Specialty Phone number    Promise Vanegas MD Referring Family Medicine [phone]    Donald Rooney MD Referring Internal Medicine [phone]    Jose Maria Morin MD  [facility] [phone]

## 2023-04-19 ENCOUNTER — TELEPHONE (OUTPATIENT)
Dept: FAMILY MEDICINE | Facility: CLINIC | Age: 63
End: 2023-04-19
Payer: COMMERCIAL

## 2023-04-19 NOTE — TELEPHONE ENCOUNTER
Patient's insurance called regarding prescription paperwork they said they sent over yesterday. I transferred him to [place] to be helped from that location so that they could check papers and see if they received the form.

## 2023-04-19 NOTE — TELEPHONE ENCOUNTER
Forms faxed to [facility]. Representative called to report that forms were incorrectly filled out. Writer requested specifically what was missing/in need of amendment. Writer was placed on hold and then [facility] disconnected call.    Fax confirmation was received.

## 2023-04-21 ENCOUNTER — MEDICAL CORRESPONDENCE (OUTPATIENT)
Dept: HEALTH INFORMATION MANAGEMENT | Facility: CLINIC | Age: 63
End: 2023-04-21

## 2023-04-21 DIAGNOSIS — Z53.9 DIAGNOSIS NOT YET DEFINED: Primary | ICD-10-CM

## 2023-04-21 PROCEDURE — G0179 MD RECERTIFICATION HHA PT: HCPCS | Performed by: FAMILY MEDICINE

## 2023-04-25 ENCOUNTER — OFFICE VISIT (OUTPATIENT)
Dept: PSYCHIATRY | Facility: CLINIC | Age: 63
End: 2023-04-25
Payer: COMMERCIAL

## 2023-04-25 DIAGNOSIS — F25.1 SCHIZOAFFECTIVE DISORDER, DEPRESSIVE TYPE (H): Primary | ICD-10-CM

## 2023-04-25 PROCEDURE — 99215 OFFICE O/P EST HI 40 MIN: CPT | Performed by: PSYCHIATRY & NEUROLOGY

## 2023-04-25 NOTE — PROGRESS NOTES
"      Medical Decision Makin. Schizoaffective disorder, depressive type (H)  Feeling better now that the holidays are over.    In Nov, Seroquel decreased, hydroxyzine and prazosin discontinued.  Plan:   Called behavioral health access with pt to make therapy appt.  Consulted with Gayle Rios [facility].  She will assist with therapy referral.  Meds:    Seroquel 150 mg nightly,     Cymbalta 120 mg daily.      2. Closed head injury  Reviewed discharge summary [facility] (-10, 2023):  Stood up at home got dizzy, fell and hit her head.  No LOC.   C/o word finding difficulty- getting speech therapy at home.         3. CHRIS (obstructive sleep apnea)  Inconsistent adherence.  Waiting for new equipment to arrive.       4. High risk medication use  Risks and benefits of medication discussed (ie metabolic s/e's, TD, CV risk).  Metabolic conditions treated by PCP.  No involuntary movements noted on MSE.  Today's DISCUS score = 0  Topamax 50 mg nightly to target appetite and weight.      5. Epilepsy  Managed by Vikki Benitez MD, [place] [facility].      Return in about 2 mos.-in-person appointment.         History of Present Illness:   Valentina Olmedo is a 63 .o. female here for SCAD, bipolar type, PTSD, Neurocognitive disorder, seizure disorder here for f/u.    Pt reports improved mood.    In-home services:  -P[facility]  -Med set up q Monday  -Food from [facility], [facility]    Weekly INR.      SOC: Lives with  René. Cat, \"Yosi\".       ROS:  Denies SI/manic/sx.  See HPI, otherwise negative.      Current Outpatient Medications:      albuterol (PROAIR HFA) 108 (90 Base) MCG/ACT inhaler, Inhale 2 puffs into the lungs every 4 hours as needed for shortness of breath / dyspnea or wheezing, Disp: 8 g, Rfl: 4     Brivaracetam (BRIVIACT) 100 MG tablet, Take 150 mg by mouth 2 times daily , Disp: , Rfl:      Calcium Citrate-Vitamin D (CALCIUM + D PO), Take 1 tablet by mouth daily , Disp: , Rfl:      carBAMazepine " (TEGRETOL) 200 MG tablet, 200mg in the  at lunch and 400mg at HS, Disp: , Rfl:      Cyanocobalamin (VITAMIN B12) 1000 MCG TBCR, Take 1 tablet by mouth, Disp: , Rfl:      DULoxetine (CYMBALTA) 60 MG capsule, Take 2 capsules (120 mg) by mouth daily, Disp: 180 capsule, Rfl: 3     esomeprazole (NEXIUM) 40 MG DR capsule, Take 1 capsule (40 mg) by mouth every morning (before breakfast) Take 30-60 minutes before eating., Disp: 90 capsule, Rfl: 3     fluticasone (FLONASE) 50 MCG/ACT nasal spray, INHALE 1 SPRAY IN EACH NOSTRIL DAILY Strength: 50 MCG/ACT, Disp: 16 g, Rfl: 11     levothyroxine (SYNTHROID/LEVOTHROID) 300 MCG tablet, Take 1 tablet (300 mcg) by mouth daily, Disp: 90 tablet, Rfl: 3     losartan (COZAAR) 25 MG tablet, Take 0.5 tablets (12.5 mg) by mouth daily, Disp: 90 tablet, Rfl: 1     meclizine (ANTIVERT) 25 MG tablet, TAKE 1/2-1 TABLET BY MOUTH BY MOUTH THREE TIMES A DAY AS NEEDED FOR DIZZINESS, Disp: 30 tablet, Rfl: 11     multivitamin with iron (CHROMAGEN) TABS half-tab, Take 1 tablet by mouth daily, Disp: , Rfl:      nicotine (NICOTROL) 10 MG inhaler, Use 1 cartridge as needed for urge to smoke by puffing over course of 20min.  Use 6-16 cart/day; reduce number of cart/day over 6-12 weeks., Disp: 168 each, Rfl: 1     Nutritional Supplements (NUTRITIONAL SHAKE HIGH PROTEIN) LIQD, Take 1 each by mouth every morning Dispense Premier protein shakes. Replace breakfast with 1 shake daily., Disp: 9900 mL, Rfl: 11     olopatadine (PATADAY) 0.2 % ophthalmic solution, Place 1 drop into both eyes daily, Disp: 2.5 mL, Rfl: 5     oxybutynin ER (DITROPAN XL) 10 MG 24 hr tablet, Take 1 tablet (10 mg) by mouth daily, Disp: 90 tablet, Rfl: 3     Polyethylene Glycol POWD, See Admin Instructions Use daily as needed, Disp: , Rfl:      QUEtiapine Fumarate 150 MG TABS, Take 150 mg by mouth At Bedtime, Disp: 90 tablet, Rfl: 3     rosuvastatin (CRESTOR) 20 MG tablet, Take 1 tablet (20 mg) by mouth daily, Disp: 90 tablet, Rfl:  3     salmeterol (SEREVENT) 50 MCG/ACT inhaler, Inhale 1 puff into the lungs 2 times daily, Disp: 180 each, Rfl: 3     SENNA-TIME 8.6 MG tablet, TAKE 2 TABLETS (17.2MG) BY MOUTH TWICE A DAY, Disp: 360 tablet, Rfl: 3     SUMAtriptan (IMITREX) 50 MG tablet, Take 1 tablet (50 mg) by mouth at onset of headache for migraine May repeat in 2 hours. Max 4 tablets/24 hours., Disp: 9 tablet, Rfl: 1     tiZANidine (ZANAFLEX) 4 MG tablet, Take 1 tablet (4 mg) by mouth 3 times daily, Disp: 42 tablet, Rfl: 3     topiramate (TOPAMAX) 50 MG tablet, Take 1 tablet (50 mg) by mouth At Bedtime, Disp: 90 tablet, Rfl: 0     varenicline (CHANTIX) 1 MG tablet, TAKE 1 TABLET BY MOUTH TWICE A DAY, Disp: 56 tablet, Rfl: 2     Vibegron (GEMTESA) 75 MG TABS tablet, Gemtesa 75 mg tablet, Disp: , Rfl:      Vitamin D-Vitamin K (VITAMIN K2-VITAMIN D3)  MCG-UNIT CAPS, 2 capsules, Disp: , Rfl:      vitamin D3 (CHOLECALCIFEROL) 50 mcg (2000 units) tablet, Take 2 tablets (100 mcg) by mouth daily, Disp: 180 tablet, Rfl: 3     warfarin ANTICOAGULANT (COUMADIN) 5 MG tablet, Take 2 tabs Mon, Fri; 2.5 tabs all other days OR as directed by INR clinic, Disp: 212 tablet, Rfl: 1     EPINEPHrine (ADRENACLICK JR) 0.15 MG/0.15ML injection 2-pack, Inject 0.15 mLs (0.15 mg) into the muscle as needed for anaphylaxis (Patient not taking: Reported on 4/25/2023), Disp: 2 each, Rfl: 1     EPINEPHrine (ANY BX GENERIC EQUIV) 0.3 MG/0.3ML injection 2-pack, Inject 0.3 mLs (0.3 mg) into the muscle as needed for anaphylaxis (Patient not taking: Reported on 4/25/2023), Disp: 2 each, Rfl: 1     fexofenadine (ALLEGRA) 180 MG tablet, Take 1 tablet (180 mg) by mouth daily, Disp: 90 tablet, Rfl: 3    Current Facility-Administered Medications:      denosumab (PROLIA) injection 60 mg, 60 mg, Subcutaneous, Q6 Months, Alice Rolle MD, 60 mg at 11/15/22 1404       LMP  (LMP Unknown)      MSE:      Alert & oriented x 3.   Appearance: Neat.  Speech: Normal rate, rhythm and  tone.  Gait: Not observed, in w/c  Musculoskeletal: No abnormal movements-had pt remove mask and observed tongue.  No fasciculations or movement.  Mood/Affect: Full range  Thought Process: Normal rate, perseverative  Thought Content: No suicide or homicide ideation.  Associations: Intact, no delusions.  Perceptions: No hallucinations.  Memory: recent and remote memory intact, not formally tested  Attention span and concentration: normal, not formally tested  Language: Intact.  Fund of Knowledge: Normal.  Insight and Judgement: Adequate.         Total time spent on this encounter, on the date of service, including pre-visit review of separately obtained history, face-to-face interaction performing medically appropriate physical exam, patient counseling/education, interpretation of diagnostic results, care coordination and documentation was 54 minutes.        Breana Puente MD

## 2023-04-25 NOTE — PROGRESS NOTES
Patient reports fluctuating BP readings. Patient delbert update PCP with reading. Today's BP:    131/78 with a pulse of 74.    Quit smoking almost 2 months ago.    Janet Stafford RN on 4/25/2023 at 10:57 AM

## 2023-05-02 ENCOUNTER — MEDICAL CORRESPONDENCE (OUTPATIENT)
Dept: HEALTH INFORMATION MANAGEMENT | Facility: CLINIC | Age: 63
End: 2023-05-02
Payer: COMMERCIAL

## 2023-05-02 ENCOUNTER — ANTICOAGULATION THERAPY VISIT (OUTPATIENT)
Dept: ANTICOAGULATION | Facility: CLINIC | Age: 63
End: 2023-05-02
Payer: COMMERCIAL

## 2023-05-02 DIAGNOSIS — Z79.01 LONG TERM (CURRENT) USE OF ANTICOAGULANTS: ICD-10-CM

## 2023-05-02 DIAGNOSIS — I26.99 PULMONARY EMBOLISM WITH INFARCTION (H): Primary | ICD-10-CM

## 2023-05-02 LAB — INR (EXTERNAL): 1.6 (ref 0.9–1.1)

## 2023-05-02 NOTE — PROGRESS NOTES
ANTICOAGULATION MANAGEMENT     Valentina Olmedo 63 year old female is on warfarin with subtherapeutic INR result. (Goal INR 2.0-3.0)    Recent labs: (last 7 days)     05/02/23  1440   INR 1.6*       ASSESSMENT       Source(s): Chart Review and Home Care/Facility Nurse       Warfarin doses taken: Warfarin taken as instructed    Diet: No new diet changes identified    Medication/supplement changes: None noted    New illness, injury, or hospitalization: No    Signs or symptoms of bleeding or clotting: No    Previous result: Therapeutic last 2(+) visits, appears to have been trending down following dose reductions in April and March when INR was elevated (illness, prednisone)    Additional findings: None         PLAN     Recommended plan for no diet, medication or health factor changes affecting INR     Dosing Instructions: Increase your warfarin dose (11% change) with next INR in 1 week       Summary  As of 5/2/2023    Full warfarin instructions:  12.5 mg every Tue; 10 mg all other days   Next INR check:  5/9/2023             Telephone call with Katelyn home care nurse who agrees to plan and repeated back plan correctly    Orders given to  Homecare nurse/facility to recheck    Education provided:     None required    Plan made per [facility] anticoagulation protocol    Fanta Smith, RN  Anticoagulation Clinic  5/2/2023    _______________________________________________________________________     Anticoagulation Episode Summary     Current INR goal:  2.0-3.0   TTR:  48.4 % (1 y)   Target end date:  Indefinite   Send INR reminders to:  [facility]    Indications    History of pulmonary embolism (Resolved) [Z86.711]  Pulmonary embolism with infarction (H) [I26.99]  Long term (current) use of anticoagulants [Z79.01]           Comments:           Anticoagulation Care Providers     Provider Role Specialty Phone number    Promise Vanegas MD Referring Family Medicine [phone]    Donald Rooney MD Referring Internal  Medicine [phone]    Jose Maria Morin MD Referring Family Medicine [phone]

## 2023-05-09 ENCOUNTER — ANTICOAGULATION THERAPY VISIT (OUTPATIENT)
Dept: ANTICOAGULATION | Facility: CLINIC | Age: 63
End: 2023-05-09
Payer: COMMERCIAL

## 2023-05-09 DIAGNOSIS — I26.99 PULMONARY EMBOLISM WITH INFARCTION (H): Primary | ICD-10-CM

## 2023-05-09 DIAGNOSIS — Z79.01 LONG TERM (CURRENT) USE OF ANTICOAGULANTS: ICD-10-CM

## 2023-05-09 LAB — INR (EXTERNAL): 2.9 (ref 0.9–1.1)

## 2023-05-09 NOTE — PROGRESS NOTES
ANTICOAGULATION MANAGEMENT     Valentina Olmedo 63 year old female is on warfarin with therapeutic INR result. (Goal INR 2.0-3.0)    Recent labs: (last 7 days)     05/09/23  1549   INR 2.9*       ASSESSMENT       Source(s): Chart Review and Home Care/Facility Nurse       Warfarin doses taken: Warfarin taken as instructed    Diet: No new diet changes identified    Medication/supplement changes: None noted    New illness, injury, or hospitalization: No    Signs or symptoms of bleeding or clotting: No    Previous result: Subtherapeutic    Additional findings: None         PLAN     Recommended plan for no diet, medication or health factor changes affecting INR     Dosing Instructions: Continue your current warfarin dose with next INR in 1 week       Summary  As of 5/9/2023    Full warfarin instructions:  12.5 mg every Tue; 10 mg all other days   Next INR check:  5/16/2023             Telephone call with Katelyn home care nurse who verbalizes understanding and agrees to plan    Orders given to  Homecare nurse/facility to recheck    Education provided:     Please call back if any changes to your diet, medications or how you've been taking warfarin    Plan made per [facility] anticoagulation protocol    Emerita Ca, RN  Anticoagulation Clinic  5/9/2023    _______________________________________________________________________     Anticoagulation Episode Summary     Current INR goal:  2.0-3.0   TTR:  47.9 % (1 y)   Target end date:  Indefinite   Send INR reminders to:  [facility]    Indications    History of pulmonary embolism (Resolved) [Z86.711]  Pulmonary embolism with infarction (H) [I26.99]  Long term (current) use of anticoagulants [Z79.01]           Comments:           Anticoagulation Care Providers     Provider Role Specialty Phone number    Promise Vanegas MD Referring Family Medicine [phone]    Donald Rooney MD Referring Internal Medicine [phone]    Jose Maria Morin MD Referring Family  Medicine [phone]

## 2023-05-10 DIAGNOSIS — Z86.711 HISTORY OF PULMONARY EMBOLISM: ICD-10-CM

## 2023-05-10 DIAGNOSIS — Z79.01 LONG TERM (CURRENT) USE OF ANTICOAGULANTS: ICD-10-CM

## 2023-05-11 ENCOUNTER — MEDICAL CORRESPONDENCE (OUTPATIENT)
Dept: HEALTH INFORMATION MANAGEMENT | Facility: CLINIC | Age: 63
End: 2023-05-11
Payer: COMMERCIAL

## 2023-05-11 DIAGNOSIS — F25.1 SCHIZOAFFECTIVE DISORDER, DEPRESSIVE TYPE (H): ICD-10-CM

## 2023-05-11 RX ORDER — WARFARIN SODIUM 5 MG/1
TABLET ORAL
Qty: 180 TABLET | Refills: 3 | Status: SHIPPED | OUTPATIENT
Start: 2023-05-11 | End: 2024-04-12

## 2023-05-11 NOTE — CONFIDENTIAL NOTE
Date of Last Office Visit: 4/25/23  Date of Next Office Visit: 7/25/23  No shows since last visit: none  Cancellations since last visit: none    Medication requested: duloxetine 60mg Date last ordered: 4/22/23 Qty: 180 Refills: 3     Lapse in medication adherence greater than 5 days?: no  If yes, call patient and gather details:    Medication refill request verified as identical to current order?: yes  Result of Last DAM, VPA, Li+ Level, CBC, or Carbamazepine Level (at or since last visit): N/A    Last visit treatment plan:   1. Schizoaffective disorder, depressive type (H)  Feeling better now that the holidays are over.    In Nov, Seroquel decreased, hydroxyzine and prazosin discontinued.  Plan:   Called behavioral health access with pt to make therapy appt.  Consulted with Gayle Rios [facility].  She will assist with therapy referral.  Meds:    Seroquel 150 mg nightly,     Cymbalta 120 mg daily.        2. Closed head injury  Reviewed discharge summary [facility] (2/7-10, 2023):  Stood up at home got dizzy, fell and hit her head.  No LOC.   C/o word finding difficulty- getting speech therapy at home.            3. CHRIS (obstructive sleep apnea)  Inconsistent adherence.  Waiting for new equipment to arrive.        4. High risk medication use  Risks and benefits of medication discussed (ie metabolic s/e's, TD, CV risk).  Metabolic conditions treated by PCP.  No involuntary movements noted on MSE.  Today's DISCUS score = 0  Topamax 50 mg nightly to target appetite and weight.        5. Epilepsy  Managed by Vikki Benitez [place], [place] [facility].        Return in about 2 mos.-in-person appointment    []Medication refilled per  Medication Refill in Ambulatory Care  policy.  [x]Medication unable to be refilled by RN due to criteria not met as indicated below:    []Eligibility - not seen in the last year   []Supervision - no future appointment   []Compliance - no shows, cancellations or lapse in therapy   []Verification -  order discrepancy   []Controlled medication   []Medication not included in policy   [x]90-day supply request   []Other

## 2023-05-16 ENCOUNTER — ANTICOAGULATION THERAPY VISIT (OUTPATIENT)
Dept: ANTICOAGULATION | Facility: CLINIC | Age: 63
End: 2023-05-16

## 2023-05-16 ENCOUNTER — EXTERNAL ORDER RESULTS (OUTPATIENT)
Dept: INTERNAL MEDICINE | Facility: CLINIC | Age: 63
End: 2023-05-16

## 2023-05-16 ENCOUNTER — ALLIED HEALTH/NURSE VISIT (OUTPATIENT)
Dept: FAMILY MEDICINE | Facility: CLINIC | Age: 63
End: 2023-05-16
Payer: COMMERCIAL

## 2023-05-16 DIAGNOSIS — M80.00XA AGE-RELATED OSTEOPOROSIS WITH CURRENT PATHOLOGICAL FRACTURE, INITIAL ENCOUNTER: Primary | ICD-10-CM

## 2023-05-16 DIAGNOSIS — N18.32 STAGE 3B CHRONIC KIDNEY DISEASE (H): ICD-10-CM

## 2023-05-16 DIAGNOSIS — M81.8 OSTEOPOROSIS, IDIOPATHIC: ICD-10-CM

## 2023-05-16 DIAGNOSIS — Z79.01 LONG TERM (CURRENT) USE OF ANTICOAGULANTS: ICD-10-CM

## 2023-05-16 DIAGNOSIS — I26.99 PULMONARY EMBOLISM WITH INFARCTION (H): Primary | ICD-10-CM

## 2023-05-16 DIAGNOSIS — M81.0 AGE-RELATED OSTEOPOROSIS WITHOUT CURRENT PATHOLOGICAL FRACTURE: Primary | ICD-10-CM

## 2023-05-16 LAB — INR (EXTERNAL): 3 (ref 0.9–1.1)

## 2023-05-16 PROCEDURE — 96372 THER/PROPH/DIAG INJ SC/IM: CPT | Performed by: INTERNAL MEDICINE

## 2023-05-16 PROCEDURE — 99207 PR NO CHARGE NURSE ONLY: CPT

## 2023-05-16 RX ORDER — DULOXETIN HYDROCHLORIDE 60 MG/1
120 CAPSULE, DELAYED RELEASE ORAL DAILY
Qty: 180 CAPSULE | Refills: 3 | Status: SHIPPED | OUTPATIENT
Start: 2023-05-16 | End: 2024-03-21

## 2023-05-16 NOTE — PROGRESS NOTES
Indication: Prolia  (denosumab) is a prescription medicine used to treat osteoporosis in patients who:     Are at high risk for fracture, meaning patients who have had a fracture related to osteoporosis, or who have multiple risk factors for fracture     Cannot use another osteoporosis medicine or other osteoporosis medicines did not work well   The timeline for early/late injections would be 4 weeks early and any time after the 6 month joe. If a patient receives their injection late, then the subsequent injection would be 6 months from the date that they actually received the injection    1.  When was the last injection?  11/15/22  2.  Did they check with their insurance for this calendar year?  Yes  3.  Is there an order in the chart?  Yes  4.  Has the patient had dental work involving the bone in the past month or will have work in the next 6 months? No  5.  Did you have the patient wait 15 minutes after the injection?  Yes  6.  Remember to use .injection under the medication notes    The following steps were completed to comply with the REMS program for Prolia:    Reviewed information in the Medication Guide and Patient Counseling Chart, including the serious risks of Prolia  and the symptoms of each risk.    Advised patient to seek prompt medical attention if they have signs or symptoms of any of the serious risks.  Provided each patient a copy of the Medication Guide and Patient Brochure.    Clinic Administered Medication Documentation      Prolia Documentation    Indication: Prolia  (denosumab) is a prescription medicine used to treat osteoporosis in patients who:     Are at high risk for fracture, meaning patients who have had a fracture related to osteoporosis, or who have multiple risk factors for fracture.    Cannot use another osteoporosis medicine or other osteoporosis medicines did not work well.    The timeline for early/late injections would be 4 weeks early and any time after the 6 month joe. If a  patient receives their injection late, then the subsequent injection would be 6 months from the date that they actually received the injection.    When was the last injection?  11/15/22  Was the last injection at least 6 months ago? Yes  Has the prior authorization been completed?  Yes  Is there an active order (written within the past 365 days, with administrations remaining, not ) in the chart?  Yes  Patient denies any dental work involving the bone (e.g. tooth extraction or dental implants) in the past 4 weeks?  Yes  Patient denies plans for any dental work involving the bone (e.g. tooth extraction or dental implants) in the next 4 weeks? Yes    The following steps were completed to comply with the REMS program for Prolia:    Reviewed information in the Medication Guide and Patient Counseling Chart, including the serious risks of Prolia  and the symptoms of each risk.    Advised patient to seek prompt medical attention if they have signs or symptoms of any of the serious risks.    Provided each patient a copy of the Medication Guide and Patient Brochure.      Prior to injection, verified patient identity using patient's name and date of birth. Medication was administered. Please see MAR and medication order for additional information. Patient instructed to remain in clinic for 15 minutes and report any adverse reaction to staff immediately.    Vial/Syringe: Single dose vial. Was entire vial of medication used? Yes  Was this medication supplied by the patient? No  Verified that the patient has refills remaining in their prescription.

## 2023-05-16 NOTE — PROGRESS NOTES
ANTICOAGULATION MANAGEMENT     Valentina Olmedo 63 year old female is on warfarin with therapeutic INR result. (Goal INR 2.0-3.0)    Recent labs: (last 7 days)     05/16/23  1030   INR 3.0*       ASSESSMENT       Source(s): Chart Review and Home Care/Facility Nurse       Warfarin doses taken: Warfarin taken as instructed    Diet: No new diet changes identified    Medication/supplement changes: None noted    New illness, injury, or hospitalization: No    Signs or symptoms of bleeding or clotting: No    Previous result: Therapeutic last visit; previously outside of goal range    Additional findings: None         PLAN     Recommended plan for no diet, medication or health factor changes affecting INR     Dosing Instructions: Continue your current warfarin dose with next INR in 2 weeks. Gave OK to check INR in one week if any changes to health, diet, or medications.    Summary  As of 5/16/2023    Full warfarin instructions:  12.5 mg every Tue; 10 mg all other days   Next INR check:  5/30/2023             Telephone call with Katelyn home care nurse who verbalizes understanding and agrees to plan    Orders given to  Homecare nurse/facility to recheck    Education provided:     Goal range and lab monitoring: goal range and significance of current result    Plan made per [facility] anticoagulation protocol    Arden Shipman RN  Anticoagulation Clinic  5/16/2023    _______________________________________________________________________     Anticoagulation Episode Summary     Current INR goal:  2.0-3.0   TTR:  49.1 % (1 y)   Target end date:  Indefinite   Send INR reminders to:  [facility]    Indications    History of pulmonary embolism (Resolved) [Z86.711]  Pulmonary embolism with infarction (H) [I26.99]  Long term (current) use of anticoagulants [Z79.01]           Comments:           Anticoagulation Care Providers     Provider Role Specialty Phone number    Promise Vanegas MD Referring Family Medicine [phone]     Donald Rooney MD Referring Internal Medicine [phone]    Jose Maria Morin MD Referring Family Medicine [phone]

## 2023-05-19 ENCOUNTER — MEDICAL CORRESPONDENCE (OUTPATIENT)
Dept: HEALTH INFORMATION MANAGEMENT | Facility: CLINIC | Age: 63
End: 2023-05-19

## 2023-05-25 ENCOUNTER — TELEPHONE (OUTPATIENT)
Dept: NURSING | Facility: CLINIC | Age: 63
End: 2023-05-25
Payer: COMMERCIAL

## 2023-05-25 NOTE — TELEPHONE ENCOUNTER
FYI:    Pt calling. Pt states that she needs a letter of necessity for an electric wheelchair. Informed pt that there needs to be a face to face visit for this and she stated that she has an appt tomorrow. Pt would like to discuss this and have form filled or a letter of necessity done at this OV as well.     Gila Benito RN, BSN  [facility] Nurse Advisor 3:14 PM 5/25/2023

## 2023-05-26 ENCOUNTER — OFFICE VISIT (OUTPATIENT)
Dept: FAMILY MEDICINE | Facility: CLINIC | Age: 63
End: 2023-05-26
Payer: COMMERCIAL

## 2023-05-26 VITALS
SYSTOLIC BLOOD PRESSURE: 112 MMHG | HEIGHT: 67 IN | HEART RATE: 84 BPM | WEIGHT: 293 LBS | RESPIRATION RATE: 18 BRPM | DIASTOLIC BLOOD PRESSURE: 75 MMHG | BODY MASS INDEX: 45.99 KG/M2 | OXYGEN SATURATION: 96 %

## 2023-05-26 DIAGNOSIS — M79.641 BILATERAL HAND PAIN: ICD-10-CM

## 2023-05-26 DIAGNOSIS — N18.32 CHRONIC KIDNEY DISEASE, STAGE 3B (H): ICD-10-CM

## 2023-05-26 DIAGNOSIS — F33.1 MAJOR DEPRESSIVE DISORDER, RECURRENT EPISODE, MODERATE (H): ICD-10-CM

## 2023-05-26 DIAGNOSIS — M80.08XD FRACTURE OF VERTEBRA DUE TO OSTEOPOROSIS WITH ROUTINE HEALING, SUBSEQUENT ENCOUNTER: ICD-10-CM

## 2023-05-26 DIAGNOSIS — M79.642 BILATERAL HAND PAIN: ICD-10-CM

## 2023-05-26 PROCEDURE — 99214 OFFICE O/P EST MOD 30 MIN: CPT | Performed by: FAMILY MEDICINE

## 2023-05-26 ASSESSMENT — PATIENT HEALTH QUESTIONNAIRE - PHQ9
10. IF YOU CHECKED OFF ANY PROBLEMS, HOW DIFFICULT HAVE THESE PROBLEMS MADE IT FOR YOU TO DO YOUR WORK, TAKE CARE OF THINGS AT HOME, OR GET ALONG WITH OTHER PEOPLE: EXTREMELY DIFFICULT
SUM OF ALL RESPONSES TO PHQ QUESTIONS 1-9: 12
SUM OF ALL RESPONSES TO PHQ QUESTIONS 1-9: 12
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
## 2023-05-26 NOTE — ASSESSMENT & PLAN NOTE
Chronic bilateral hand pain  Has done physical therapy but that didn't help. Thinks she has rheumatoid arthritis.   [facility] referral given for diagnosis and treatment.
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
## 2023-05-26 NOTE — TELEPHONE ENCOUNTER
Patient was seen by Dr. Mota in clinic today 5/26/23.  Letter os necessity for electrical wheel chair was faxed to  [employer] at fax number [phone], phone number [phone] per information given by patient.

## 2023-05-30 ENCOUNTER — ANTICOAGULATION THERAPY VISIT (OUTPATIENT)
Dept: ANTICOAGULATION | Facility: CLINIC | Age: 63
End: 2023-05-30
Payer: COMMERCIAL

## 2023-05-30 DIAGNOSIS — I26.99 PULMONARY EMBOLISM WITH INFARCTION (H): Primary | ICD-10-CM

## 2023-05-30 DIAGNOSIS — Z79.01 LONG TERM (CURRENT) USE OF ANTICOAGULANTS: ICD-10-CM

## 2023-05-30 LAB — INR (EXTERNAL): 1.5 (ref 0.9–1.1)

## 2023-05-30 NOTE — PROGRESS NOTES
ANTICOAGULATION MANAGEMENT     Valentina Olmedo 63 year old female is on warfarin with subtherapeutic INR result. (Goal INR 2.0-3.0)    Recent labs: (last 7 days)     05/30/23  1509   INR 1.5*       ASSESSMENT       Source(s): Chart Review and Home Care/Facility Nurse       Warfarin doses taken: Missed warfarin on 05/28/2023    Diet: No new diet changes identified    Medication/supplement changes: None noted    New illness, injury, or hospitalization: No    Signs or symptoms of bleeding or clotting: No    Previous result: Therapeutic last 2(+) visits    Additional findings: None         PLAN     Recommended plan for temporary change(s) affecting INR     Dosing Instructions: booster dose then continue your current warfarin dose with next INR in 1 week       Summary  As of 5/30/2023    Full warfarin instructions:  5/30: 15 mg; Otherwise 12.5 mg every Tue; 10 mg all other days   Next INR check:  6/6/2023             Telephone call with Katelyn home care nurse who verbalizes understanding and agrees to plan    Orders given to  Homecare nurse/facility to recheck    Education provided:     Goal range and lab monitoring: goal range and significance of current result    Plan made per [facility] anticoagulation protocol    Arden Shipman RN  Anticoagulation Clinic  5/30/2023    _______________________________________________________________________     Anticoagulation Episode Summary     Current INR goal:  2.0-3.0   TTR:  48.6 % (1 y)   Target end date:  Indefinite   Send INR reminders to:  [facility]    Indications    History of pulmonary embolism (Resolved) [Z86.711]  Pulmonary embolism with infarction (H) [I26.99]  Long term (current) use of anticoagulants [Z79.01]           Comments:           Anticoagulation Care Providers     Provider Role Specialty Phone number    Promise Vanegas MD Referring Family Medicine [phone]    Donald Rooney MD Referring Internal Medicine [phone]    Jose Maria Morin MD  [facility] Family Medicine [phone]

## 2023-06-05 ENCOUNTER — MEDICAL CORRESPONDENCE (OUTPATIENT)
Dept: HEALTH INFORMATION MANAGEMENT | Facility: CLINIC | Age: 63
End: 2023-06-05
Payer: COMMERCIAL

## 2023-06-05 DIAGNOSIS — F17.210 CIGARETTE NICOTINE DEPENDENCE: ICD-10-CM

## 2023-06-05 DIAGNOSIS — R63.5 WEIGHT GAIN: ICD-10-CM

## 2023-06-05 RX ORDER — VARENICLINE TARTRATE 1 MG/1
TABLET, FILM COATED ORAL
Qty: 56 TABLET | Refills: 2 | Status: SHIPPED | OUTPATIENT
Start: 2023-06-05 | End: 2023-08-28

## 2023-06-05 NOTE — TELEPHONE ENCOUNTER
Date of Last Office Visit: 4/25/2023  Date of Next Office Visit: 7/25/2023  No shows since last visit: 0  Cancellations since last visit: 0    Medication requested: topiramate (TOPAMAX) 50 MG tablet Date last ordered: 3/23/2023 Qty: 90 Refills: 0    Lapse in medication adherence greater than 5 days?: no  If yes, call patient and gather details:   Medication refill request verified as identical to current order?: yes  Result of Last DAM, VPA, Li+ Level, CBC, or Carbamazepine Level (at or since last visit): N/A    Last visit treatment plan: 4/25/2023  1. Schizoaffective disorder, depressive type (H)  Feeling better now that the holidays are over.    In Nov, Seroquel decreased, hydroxyzine and prazosin discontinued.  Plan:   Called behavioral health access with pt to make therapy appt.  Consulted with Gayle Rios [facility].  She will assist with therapy referral.  Meds:    Seroquel 150 mg nightly,     Cymbalta 120 mg daily.  2. Closed head injury  Reviewed discharge summary [facility] (2/7-10, 2023):  Stood up at home got dizzy, fell and hit her head.  No LOC.   C/o word finding difficulty- getting speech therapy at home.    3. CHRIS (obstructive sleep apnea)  Inconsistent adherence.  Waiting for new equipment to arrive.  4. High risk medication use  Risks and benefits of medication discussed (ie metabolic s/e's, TD, CV risk).  Metabolic conditions treated by PCP.  No involuntary movements noted on MSE.  Today's DISCUS score = 0  Topamax 50 mg nightly to target appetite and weight.     5. Epilepsy  Managed by Vikki Benitez MD, [place] [facility].      Return in about 2 mos.-in-person appointment.      []Medication refilled per  Medication Refill in Ambulatory Care  policy.  [x]Medication unable to be refilled by RN due to criteria not met as indicated below:    []Eligibility - not seen in the last year   []Supervision - no future appointment   []Compliance - no shows, cancellations or lapse in therapy   []Verification  - order discrepancy   []Controlled medication   []Medication not included in policy   []90-day supply request   [x]Other - LPN processing

## 2023-06-06 ENCOUNTER — ANTICOAGULATION THERAPY VISIT (OUTPATIENT)
Dept: ANTICOAGULATION | Facility: CLINIC | Age: 63
End: 2023-06-06
Payer: COMMERCIAL

## 2023-06-06 DIAGNOSIS — I26.99 PULMONARY EMBOLISM WITH INFARCTION (H): Primary | ICD-10-CM

## 2023-06-06 DIAGNOSIS — Z79.01 LONG TERM (CURRENT) USE OF ANTICOAGULANTS: ICD-10-CM

## 2023-06-06 LAB — INR (EXTERNAL): 1.4 (ref 0.9–1.1)

## 2023-06-06 RX ORDER — TOPIRAMATE 50 MG/1
50 TABLET, FILM COATED ORAL AT BEDTIME
Qty: 90 TABLET | Refills: 3 | Status: SHIPPED | OUTPATIENT
Start: 2023-06-06 | End: 2024-02-06 | Stop reason: SINTOL

## 2023-06-06 NOTE — PROGRESS NOTES
ANTICOAGULATION MANAGEMENT     Valentina Olmedo 63 year old female is on warfarin with subtherapeutic INR result. (Goal INR 2.0-3.0)    Recent labs: (last 7 days)     06/06/23  1115   INR 1.4*       ASSESSMENT       Source(s): Chart Review and Home Care/Facility Nurse       Warfarin doses taken: Missed dose(s) may be affecting INR    Diet: No new diet changes identified    Medication/supplement changes: None noted    New illness, injury, or hospitalization: No    Signs or symptoms of bleeding or clotting: No    Previous result: Subtherapeutic    Additional findings: slow to rise back up from missed coumadin dosing         PLAN     Recommended plan for temporary change(s) affecting INR     Dosing Instructions: booster dose then Increase your warfarin dose (3% change) with next INR in 1 week       Summary  As of 6/6/2023    Full warfarin instructions:  6/6: 20 mg; Otherwise 12.5 mg every Tue, Fri; 10 mg all other days   Next INR check:  6/13/2023             Telephone call with Lea home care nurse who agrees to plan and repeated back plan correctly    Orders given to  Homecare nurse/facility to recheck    Education provided:     Please call back if any changes to your diet, medications or how you've been taking warfarin    Plan made per [facility] anticoagulation protocol    Melissa Montenegro, RN  Anticoagulation Clinic  6/6/2023    _______________________________________________________________________     Anticoagulation Episode Summary     Current INR goal:  2.0-3.0   TTR:  46.8 % (1 y)   Target end date:  Indefinite   Send INR reminders to:  [facility]    Indications    History of pulmonary embolism (Resolved) [Z86.711]  Pulmonary embolism with infarction (H) [I26.99]  Long term (current) use of anticoagulants [Z79.01]           Comments:           Anticoagulation Care Providers     Provider Role Specialty Phone number    Promise Vanegas MD Referring Family Medicine [phone]    Donald Rooney MD Referring  Internal Medicine [phone]    Jose Maria Morin MD Referring Family Medicine [phone]

## 2023-06-08 ENCOUNTER — ANCILLARY PROCEDURE (OUTPATIENT)
Dept: MAMMOGRAPHY | Facility: CLINIC | Age: 63
End: 2023-06-08
Attending: FAMILY MEDICINE
Payer: COMMERCIAL

## 2023-06-08 DIAGNOSIS — Z12.31 VISIT FOR SCREENING MAMMOGRAM: ICD-10-CM

## 2023-06-08 PROCEDURE — 77067 SCR MAMMO BI INCL CAD: CPT

## 2023-06-09 ENCOUNTER — MEDICAL CORRESPONDENCE (OUTPATIENT)
Dept: HEALTH INFORMATION MANAGEMENT | Facility: CLINIC | Age: 63
End: 2023-06-09
Payer: COMMERCIAL

## 2023-06-12 ENCOUNTER — ANTICOAGULATION THERAPY VISIT (OUTPATIENT)
Dept: ANTICOAGULATION | Facility: CLINIC | Age: 63
End: 2023-06-12
Payer: COMMERCIAL

## 2023-06-12 ENCOUNTER — HOSPITAL ENCOUNTER (OUTPATIENT)
Dept: CARDIAC REHAB | Facility: HOSPITAL | Age: 63
Discharge: HOME | End: 2023-06-12
Attending: FAMILY MEDICINE
Payer: COMMERCIAL

## 2023-06-12 DIAGNOSIS — Z79.01 LONG TERM (CURRENT) USE OF ANTICOAGULANTS: ICD-10-CM

## 2023-06-12 DIAGNOSIS — G47.33 OSA (OBSTRUCTIVE SLEEP APNEA): ICD-10-CM

## 2023-06-12 DIAGNOSIS — I26.99 PULMONARY EMBOLISM WITH INFARCTION (H): Primary | ICD-10-CM

## 2023-06-12 LAB — INR (EXTERNAL): 2.9 (ref 0.9–1.1)

## 2023-06-12 PROCEDURE — G0238 OTH RESP PROC, INDIV: HCPCS

## 2023-06-12 NOTE — PROGRESS NOTES
ANTICOAGULATION MANAGEMENT     Valentina Olmedo 63 year old female is on warfarin with therapeutic INR result. (Goal INR 2.0-3.0)    Recent labs: (last 7 days)     06/12/23  0905   INR 2.9*       ASSESSMENT       Source(s): Chart Review and Home Care/Facility Nurse       Warfarin doses taken: Pt took booster dose last week which may be causing INR to be at higher at end of goal.    Diet: No new diet changes identified    Medication/supplement changes: None noted    New illness, injury, or hospitalization: Yes: Diarrhea for a couple weeks. Unchanged in severity. Diarrhea can cause an increase in INR.    Signs or symptoms of bleeding or clotting: No    Previous result: Subtherapeutic    Additional findings: None         PLAN     Recommended plan for ongoing change(s) affecting INR     Dosing Instructions: Continue your current warfarin dose with next INR in 4 days       Summary  As of 6/12/2023    Full warfarin instructions:  12.5 mg every Tue, Fri; 10 mg all other days   Next INR check:  6/16/2023             Telephone call with Lea home care nurse who verbalizes understanding and agrees to plan    Orders given to  Homecare nurse/facility to recheck    Education provided:     Goal range and lab monitoring: goal range and significance of current result    Plan made per [facility] anticoagulation protocol    Arden Shipman RN  Anticoagulation Clinic  6/12/2023    _______________________________________________________________________     Anticoagulation Episode Summary     Current INR goal:  2.0-3.0   TTR:  46.2 % (1 y)   Target end date:  Indefinite   Send INR reminders to:  [facility]    Indications    History of pulmonary embolism (Resolved) [Z86.711]  Pulmonary embolism with infarction (H) [I26.99]  Long term (current) use of anticoagulants [Z79.01]           Comments:           Anticoagulation Care Providers     Provider Role Specialty Phone number    Promise Vanegas MD Referring Family Medicine [phone]     Donald Rooney MD Referring Internal Medicine [phone]    Jose Maria Morin MD Referring Family Medicine [phone]
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
## 2023-06-13 ENCOUNTER — TRANSFERRED RECORDS (OUTPATIENT)
Dept: HEALTH INFORMATION MANAGEMENT | Facility: CLINIC | Age: 63
End: 2023-06-13
Payer: COMMERCIAL

## 2023-06-13 ENCOUNTER — NURSE TRIAGE (OUTPATIENT)
Dept: NURSING | Facility: CLINIC | Age: 63
End: 2023-06-13
Payer: COMMERCIAL

## 2023-06-13 ENCOUNTER — TELEPHONE (OUTPATIENT)
Dept: FAMILY MEDICINE | Facility: CLINIC | Age: 63
End: 2023-06-13
Payer: COMMERCIAL

## 2023-06-13 NOTE — TELEPHONE ENCOUNTER
Reason for Call:  Other call back    Detailed comments: patient called and states weight gain from medication prescribed by Dr Bo Muhammad at [facility].    States quit smoking 6 months ago.    When asked how much has she gained, she stated to me 100 lbs.    FNA transferred.    Please contact patient.  Thank you.    Phone Number Patient can be reached at: Cell number on file:    Telephone Information:   Mobile [phone]       Best Time: any    Can we leave a detailed message on this number? YES    Call taken on 6/13/2023 at 3:51 PM by Regla Caldera

## 2023-06-13 NOTE — TELEPHONE ENCOUNTER
Nurse Triage SBAR    Situation: Weight gain     Background: Patient calling. Pt stated about 6 months ago she stopped smoking. Pt uses a CPAP at night.     Assessment: Pt gained 100 pounds in 6 months. Pt want wants to take some medication for weight loss. Pt thinks she is concerned that her medications are causing her to gain weight. Pt is very unhappy about her weight.     RN noted that the patient seemed out of breath and triaged her breathing. Pt has been having a harder time breathing. She has been using her albuterol a lot. She states she has been having chest pain but not right now. Wheezing.     Protocol Recommended Disposition: Emergency Department    Recommendation: According to the protocol, Patient should go to the ED now. Advised Patient that the patient needs to go to the ED now. Care advice given. Patient verbalizes understanding and agrees with plan of care. Reviewed concerning symptoms and when to call 911.    Will route to PCP for weight loss concerns and recommendations. She stated she wants to loose weight fast and believes her weigh gain is due to her medications.    Arlyn Winslow RN Nursing Advisor 6/13/2023 4:16 PM     Reason for Disposition    [1] MODERATE difficulty breathing (e.g., speaks in phrases, SOB even at rest, pulse 100-120) AND [2] NEW-onset or WORSE than normal    Wheezing can be heard across the room    Additional Information    Negative: SEVERE difficulty breathing (e.g., struggling for each breath, speaks in single words)    Negative: [1] Breathing stopped AND [2] hasn't returned    Negative: Choking on something    Negative: Bluish (or gray) lips or face now    Negative: Passed out (i.e., lost consciousness, collapsed and was not responding)    Negative: Difficult to awaken or acting confused (e.g., disoriented, slurred speech)    Negative: Wheezing started suddenly after medicine, an allergic food or bee sting    Negative: Slow, shallow and weak breathing    Negative:  Stridor    Negative: Sounds like a life-threatening emergency to the triager    Negative: Chest pain    Negative: [1] Wheezing (high pitched whistling sound) AND [2] previous asthma attacks or use of asthma medicines    Negative: [1] Difficulty breathing AND [2] only present when coughing    Negative: [1] Difficulty breathing AND [2] only from stuffy or runny nose    Negative: [1] Difficulty breathing AND [2] within 14 days of COVID-19 Exposure    Protocols used: BREATHING DIFFICULTY-A-AH

## 2023-06-14 ENCOUNTER — MEDICAL CORRESPONDENCE (OUTPATIENT)
Dept: HEALTH INFORMATION MANAGEMENT | Facility: CLINIC | Age: 63
End: 2023-06-14
Payer: COMMERCIAL

## 2023-06-14 NOTE — TELEPHONE ENCOUNTER
Please call patient for status update.  I cannot find an emergency department visit note in any health system.    I recommend a clinic visit focus specifically on nutrition, weight and medication side effects.  She had an establish care visit with Dr. Mota (?).  She could meet with either me or Dr. Mota (or any provider).      When I review her weight trend, her weight is very similar from May in comparison to last May.

## 2023-06-15 ENCOUNTER — HOSPITAL ENCOUNTER (OUTPATIENT)
Dept: CARDIAC REHAB | Facility: HOSPITAL | Age: 63
Discharge: HOME | End: 2023-06-15
Attending: INTERNAL MEDICINE
Payer: COMMERCIAL

## 2023-06-15 ENCOUNTER — MEDICAL CORRESPONDENCE (OUTPATIENT)
Dept: HEALTH INFORMATION MANAGEMENT | Facility: CLINIC | Age: 63
End: 2023-06-15

## 2023-06-15 PROCEDURE — G0238 OTH RESP PROC, INDIV: HCPCS

## 2023-06-15 PROCEDURE — G0237 THERAPEUTIC PROCD STRG ENDUR: HCPCS

## 2023-06-16 ENCOUNTER — ANTICOAGULATION THERAPY VISIT (OUTPATIENT)
Dept: ANTICOAGULATION | Facility: CLINIC | Age: 63
End: 2023-06-16
Payer: COMMERCIAL

## 2023-06-16 DIAGNOSIS — I26.99 PULMONARY EMBOLISM WITH INFARCTION (H): Primary | ICD-10-CM

## 2023-06-16 DIAGNOSIS — Z79.01 LONG TERM (CURRENT) USE OF ANTICOAGULANTS: ICD-10-CM

## 2023-06-16 LAB — INR (EXTERNAL): 2.2 (ref 0.9–1.1)

## 2023-06-16 NOTE — PROGRESS NOTES
ANTICOAGULATION MANAGEMENT     Valentina Olmedo 63 year old female is on warfarin with therapeutic INR result. (Goal INR 2.0-3.0)    Recent labs: (last 7 days)     06/16/23  1512   INR 2.2*       ASSESSMENT       Source(s): Chart Review and Home Care/Facility Nurse       Warfarin doses taken: Warfarin taken as instructed    Diet: No new diet changes identified    Medication/supplement changes: None noted    New illness, injury, or hospitalization: No    Signs or symptoms of bleeding or clotting: No    Previous result: Therapeutic last visit; previously outside of goal range    Additional findings: None         PLAN     Recommended plan for no diet, medication or health factor changes affecting INR     Dosing Instructions: Continue your current warfarin dose with next INR in 4 days       Summary  As of 6/16/2023    Full warfarin instructions:  12.5 mg every Tue, Fri; 10 mg all other days   Next INR check:  6/20/2023             Telephone call with Katelyn home care nurse who verbalizes understanding and agrees to plan    Orders given to  Homecare nurse/facility to recheck    Education provided:     Please call back if any changes to your diet, medications or how you've been taking warfarin    Plan made per [facility] anticoagulation protocol    Kyleigh Jimenes RN  Anticoagulation Clinic  6/16/2023    _______________________________________________________________________     Anticoagulation Episode Summary     Current INR goal:  2.0-3.0   TTR:  46.2 % (1 y)   Target end date:  Indefinite   Send INR reminders to:  [facility]    Indications    History of pulmonary embolism (Resolved) [Z86.711]  Pulmonary embolism with infarction (H) [I26.99]  Long term (current) use of anticoagulants [Z79.01]           Comments:           Anticoagulation Care Providers     Provider Role Specialty Phone number    Promise Vanegas MD Referring Family Medicine [phone]    Donald Rooney MD Referring Internal Medicine [phone]     Jose Maria Morin MD [facility] Medicine [phone]

## 2023-06-19 ENCOUNTER — MEDICAL CORRESPONDENCE (OUTPATIENT)
Dept: HEALTH INFORMATION MANAGEMENT | Facility: CLINIC | Age: 63
End: 2023-06-19

## 2023-06-19 DIAGNOSIS — Z53.9 DIAGNOSIS NOT YET DEFINED: Primary | ICD-10-CM

## 2023-06-19 PROCEDURE — G0179 MD RECERTIFICATION HHA PT: HCPCS | Performed by: FAMILY MEDICINE

## 2023-06-20 ENCOUNTER — HOSPITAL ENCOUNTER (OUTPATIENT)
Dept: CARDIAC REHAB | Facility: HOSPITAL | Age: 63
Discharge: HOME | End: 2023-06-20
Attending: INTERNAL MEDICINE
Payer: COMMERCIAL

## 2023-06-20 ENCOUNTER — ANTICOAGULATION THERAPY VISIT (OUTPATIENT)
Dept: ANTICOAGULATION | Facility: CLINIC | Age: 63
End: 2023-06-20
Payer: COMMERCIAL

## 2023-06-20 DIAGNOSIS — Z79.01 LONG TERM (CURRENT) USE OF ANTICOAGULANTS: ICD-10-CM

## 2023-06-20 DIAGNOSIS — I26.99 PULMONARY EMBOLISM WITH INFARCTION (H): Primary | ICD-10-CM

## 2023-06-20 LAB — INR (EXTERNAL): 2.4 (ref 0.9–1.1)

## 2023-06-20 PROCEDURE — G0239 OTH RESP PROC, GROUP: HCPCS

## 2023-06-20 NOTE — PROGRESS NOTES
ANTICOAGULATION MANAGEMENT     Valentina Olmedo 63 year old female is on warfarin with therapeutic INR result. (Goal INR 2.0-3.0)    Recent labs: (last 7 days)     06/20/23  1337   INR 2.4*       ASSESSMENT       Source(s): Chart Review and Home Care/Facility Nurse       Warfarin doses taken: Warfarin taken as instructed    Diet: No new diet changes identified    Medication/supplement changes: None noted    New illness, injury, or hospitalization: No    Signs or symptoms of bleeding or clotting: No    Previous result: Therapeutic last 2(+) visits    Additional findings: None         PLAN     Recommended plan for no diet, medication or health factor changes affecting INR     Dosing Instructions: Continue your current warfarin dose with next INR in 1 week       Summary  As of 6/20/2023    Full warfarin instructions:  12.5 mg every Tue, Fri; 10 mg all other days   Next INR check:  6/27/2023             Telephone call with nicole home care nurse who verbalizes understanding and agrees to plan and who agrees to plan and repeated back plan correctly    Orders given to  Homecare nurse/facility to recheck    Education provided:     Contact [phone]  with any changes, questions or concerns.     Plan made per [facility] anticoagulation protocol    Ashley Naranjo RN  Anticoagulation Clinic  6/20/2023    _______________________________________________________________________     Anticoagulation Episode Summary     Current INR goal:  2.0-3.0   TTR:  46.2 % (1 y)   Target end date:  Indefinite   Send INR reminders to:  [facility]    Indications    History of pulmonary embolism (Resolved) [Z86.711]  Pulmonary embolism with infarction (H) [I26.99]  Long term (current) use of anticoagulants [Z79.01]           Comments:           Anticoagulation Care Providers     Provider Role Specialty Phone number    Promise Vanegas MD Referring Family Medicine [phone]    Donald Rooney MD Referring Internal Medicine [phone]     Jose Maria Morin MD [facility] Medicine [phone]
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
## 2023-06-22 ENCOUNTER — MEDICAL CORRESPONDENCE (OUTPATIENT)
Dept: HEALTH INFORMATION MANAGEMENT | Facility: CLINIC | Age: 63
End: 2023-06-22

## 2023-06-22 ENCOUNTER — OFFICE VISIT (OUTPATIENT)
Dept: FAMILY MEDICINE | Facility: CLINIC | Age: 63
End: 2023-06-22
Payer: COMMERCIAL

## 2023-06-22 ENCOUNTER — TRANSFERRED RECORDS (OUTPATIENT)
Dept: HEALTH INFORMATION MANAGEMENT | Facility: CLINIC | Age: 63
End: 2023-06-22

## 2023-06-22 VITALS
OXYGEN SATURATION: 98 % | HEIGHT: 67 IN | DIASTOLIC BLOOD PRESSURE: 82 MMHG | WEIGHT: 293 LBS | SYSTOLIC BLOOD PRESSURE: 118 MMHG | RESPIRATION RATE: 20 BRPM | HEART RATE: 80 BPM | BODY MASS INDEX: 45.99 KG/M2

## 2023-06-22 DIAGNOSIS — E66.01 CLASS 3 SEVERE OBESITY DUE TO EXCESS CALORIES WITH SERIOUS COMORBIDITY AND BODY MASS INDEX (BMI) OF 50.0 TO 59.9 IN ADULT (H): Primary | ICD-10-CM

## 2023-06-22 DIAGNOSIS — G62.0 POLYNEUROPATHY DUE TO DRUG (H): ICD-10-CM

## 2023-06-22 DIAGNOSIS — E66.813 CLASS 3 SEVERE OBESITY DUE TO EXCESS CALORIES WITH SERIOUS COMORBIDITY AND BODY MASS INDEX (BMI) OF 50.0 TO 59.9 IN ADULT (H): Primary | ICD-10-CM

## 2023-06-22 DIAGNOSIS — E27.8 ADRENAL MASS, LEFT (H): ICD-10-CM

## 2023-06-22 DIAGNOSIS — J44.9 CHRONIC OBSTRUCTIVE PULMONARY DISEASE, UNSPECIFIED COPD TYPE (H): ICD-10-CM

## 2023-06-22 DIAGNOSIS — G40.909 NONINTRACTABLE EPILEPSY WITHOUT STATUS EPILEPTICUS, UNSPECIFIED EPILEPSY TYPE (H): ICD-10-CM

## 2023-06-22 DIAGNOSIS — E88.810 METABOLIC SYNDROME X: ICD-10-CM

## 2023-06-22 DIAGNOSIS — M80.08XD FRACTURE OF VERTEBRA DUE TO OSTEOPOROSIS WITH ROUTINE HEALING, SUBSEQUENT ENCOUNTER: ICD-10-CM

## 2023-06-22 DIAGNOSIS — G62.9 PERIPHERAL POLYNEUROPATHY: ICD-10-CM

## 2023-06-22 PROCEDURE — 99215 OFFICE O/P EST HI 40 MIN: CPT | Performed by: FAMILY MEDICINE

## 2023-06-22 ASSESSMENT — ENCOUNTER SYMPTOMS: COUGH: 1

## 2023-06-22 NOTE — ASSESSMENT & PLAN NOTE
Symptoms stable.  I suspect that she has experienced some weight gain from duloxetine.  Nevertheless, I think given her history of neuropathy she should be on this class of medications.

## 2023-06-22 NOTE — ASSESSMENT & PLAN NOTE
63 year old year old female in clinic today to discuss treatment of the following conditions through diet and lifestyle modification and weight loss:  1. Class 3 severe obesity due to excess calories with serious comorbidity and body mass index (BMI) of 50.0 to 59.9 in adult (H)    2. Metabolic syndrome X    3. Fracture of vertebra due to osteoporosis with routine healing, subsequent encounter    4. Nonintractable epilepsy without status epilepticus, unspecified epilepsy type (H)    5. 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 Chronic obstructive pulmonary disease, unspecified COPD type (H)    7. Adrenal mass, left (H)    8. Peripheral polyneuropathy      63-year-old woman with complicated health history presenting for discussion of weight and metabolic health.  She is frustrated that she has regained much of the weight that she lost over the past 1-2 years.  She is concerned that medications are causing side effects in particular quetiapine, medication that has been helpful in helping regulate mood and schizoaffective disorder symptoms.  We had a lengthy discussion regarding nutrition.  I recommended that she focus on a protein rich in carbohydrate restricted diet.  I pointed out that there are some medications on her list that have weight loss properties such as topiramate.  There is no contraindication to any of the GLP-1 receptor agonist.  She does meet criteria for metabolic syndrome.  Liraglutide sent to pharmacy as there is no contraindication.  I told the patient that I think it is unlikely that her insurance plan would cover it given her participation in Medicare.  However, I have had the experience of individuals on [place] medical assistance getting coverage for this class of medicine.  If necessary, we will arrange for her to meet with our bariatric dietitian to review progress towards nutritional goals.  I have asked her to meet with her psychiatrist to review her medications and whether or not there may  be changes that she could try that would be advantageous from a metabolic/weight health perspective.

## 2023-06-22 NOTE — PROGRESS NOTES
Assessment & Plan   Problem List Items Addressed This Visit     Adrenal mass, left (H)     Unlikely to contribute to weight gain.         Chronic obstructive pulmonary disease (H)     The patient's spouse currently is undergoing treatment for pneumonia.  She herself is worried about her breathing but was able to speak without difficulty, was not audibly wheezing and appeared comfortable throughout the encounter.  She coughed 3 times in total during the 45 minutes we shared the room.  For now, I do not think she needs an antibiotic nor do I believe she needs a steroid burst.  Continue to monitor.         Class 3 severe obesity due to excess calories with serious comorbidity and body mass index (BMI) of 50.0 to 59.9 in adult (H) - Primary     63 year old year old female in clinic today to discuss treatment of the following conditions through diet and lifestyle modification and weight loss:  1. Class 3 severe obesity due to excess calories with serious comorbidity and body mass index (BMI) of 50.0 to 59.9 in adult (H)    2. Metabolic syndrome X    3. Fracture of vertebra due to osteoporosis with routine healing, subsequent encounter    4. Nonintractable epilepsy without status epilepticus, unspecified epilepsy type (H)    5. 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 Chronic obstructive pulmonary disease, unspecified COPD type (H)    7. Adrenal mass, left (H)    8. Peripheral polyneuropathy      63-year-old woman with complicated health history presenting for discussion of weight and metabolic health.  She is frustrated that she has regained much of the weight that she lost over the past 1-2 years.  She is concerned that medications are causing side effects in particular quetiapine, medication that has been helpful in helping regulate mood and schizoaffective disorder symptoms.  We had a lengthy discussion regarding nutrition.  I recommended that she focus on a protein rich in carbohydrate restricted diet.  I pointed out  that there are some medications on her list that have weight loss properties such as topiramate.  There is no contraindication to any of the GLP-1 receptor agonist.  She does meet criteria for metabolic syndrome.  Liraglutide sent to pharmacy as there is no contraindication.  I told the patient that I think it is unlikely that her insurance plan would cover it given her participation in Medicare.  However, I have had the experience of individuals on [place] medical assistance getting coverage for this class of medicine.  If necessary, we will arrange for her to meet with our bariatric dietitian to review progress towards nutritional goals.  I have asked her to meet with her psychiatrist to review her medications and whether or not there may be changes that she could try that would be advantageous from a metabolic/weight health perspective.         Relevant Medications    liraglutide - Weight Management (SAXENDA) 18 MG/3ML pen    Fracture of vertebra due to osteoporosis with routine healing, subsequent encounter    Relevant Orders    Wheelchair Scooter Order for DME with OT or PT Referral    Nonintractable epilepsy without status epilepticus (H)    Relevant Orders    Wheelchair Scooter Order for DME with OT or PT Referral    Peripheral polyneuropathy     There are multiple health conditions which make her unstable of gait.  She has been in a wheelchair for quite some time.  In 2022 we attempted to get a motorized scooter or motorized wheelchair.  I thought this had actually been successful but the patient reports today that she was not able to get a device.  We will attempt to push this through this calendar year.  Goal is to improve mobility, in particular to get her out of her home which will help with mental health and physical health.         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  I suspect that she has experienced  "some weight gain from duloxetine.  Nevertheless, I think given her history of neuropathy she should be on this class of medications.         Relevant Medications    liraglutide - Weight Management (SAXENDA) 18 MG/3ML pen   Other Visit Diagnoses     Metabolic syndrome X        Relevant Medications    liraglutide - Weight Management (SAXENDA) 18 MG/3ML pen        42 minutes spent by me on the date of the encounter doing chart review, history and exam, documentation and further activities per the note       BMI:   Estimated body mass index is 55.6 kg/m  as calculated from the following:    Height as of this encounter: 1.689 m (5' 6.5\").    Weight as of this encounter: 158.6 kg (349 lb 11.2 oz).   Weight management plan: Specific weight management program called Comprehensive weight management discussed    Jose Maria Morin MD  [facility]    Magda Montgomery is a 63 year old, presenting for the following health issues:  Cough (Cough x 3 days) and Weight Problem        6/22/2023    11:56 AM   Additional Questions   Roomed by lisa     Cough    History of Present Illness       Reason for visit:  Weight and my  has pnemonia and im wondering if i have it and i fell this morning and hurt my hands    She eats 0-1 servings of fruits and vegetables daily.She consumes 0 sweetened beverage(s) daily.She exercises with enough effort to increase her heart rate 60 or more minutes per day.  She exercises with enough effort to increase her heart rate 3 or less days per week.   She is taking medications regularly.    Review of Systems   Respiratory: Positive for cough.           Objective    /82 (BP Location: Right arm, Patient Position: Sitting, Cuff Size: Adult Large)   Pulse 80   Resp 20   Ht 1.689 m (5' 6.5\")   Wt (!) 158.6 kg (349 lb 11.2 oz)   LMP  (LMP Unknown)   SpO2 98%   BMI 55.60 kg/m    Body mass index is 55.6 kg/m .  Physical Exam  Nursing note reviewed.   Constitutional:       " General: She is not in acute distress.     Appearance: Normal appearance. She is obese. She is not ill-appearing.      Comments: Wheelchair   HENT:      Head: Normocephalic and atraumatic.   Eyes:      Extraocular Movements: Extraocular movements intact.      Conjunctiva/sclera: Conjunctivae normal.   Pulmonary:      Effort: Pulmonary effort is normal.   Neurological:      Mental Status: She is alert and oriented to person, place, and time.   Psychiatric:         Attention and Perception: Attention normal.         Mood and Affect: Mood normal.         Speech: Speech normal.         Thought Content: Thought content normal.

## 2023-06-22 NOTE — ASSESSMENT & PLAN NOTE
There are multiple health conditions which make her unstable of gait.  She has been in a wheelchair for quite some time.  In 2022 we attempted to get a motorized scooter or motorized wheelchair.  I thought this had actually been successful but the patient reports today that she was not able to get a device.  We will attempt to push this through this calendar year.  Goal is to improve mobility, in particular to get her out of her home which will help with mental health and physical health.

## 2023-06-22 NOTE — ASSESSMENT & PLAN NOTE
The patient's spouse currently is undergoing treatment for pneumonia.  She herself is worried about her breathing but was able to speak without difficulty, was not audibly wheezing and appeared comfortable throughout the encounter.  She coughed 3 times in total during the 45 minutes we shared the room.  For now, I do not think she needs an antibiotic nor do I believe she needs a steroid burst.  Continue to monitor.

## 2023-06-23 ENCOUNTER — OFFICE VISIT (OUTPATIENT)
Dept: PHYSICAL MEDICINE AND REHAB | Facility: CLINIC | Age: 63
End: 2023-06-23
Payer: COMMERCIAL

## 2023-06-23 VITALS
BODY MASS INDEX: 45.99 KG/M2 | SYSTOLIC BLOOD PRESSURE: 140 MMHG | WEIGHT: 293 LBS | HEIGHT: 67 IN | HEART RATE: 67 BPM | DIASTOLIC BLOOD PRESSURE: 67 MMHG

## 2023-06-23 DIAGNOSIS — M54.16 LUMBAR RADICULITIS: ICD-10-CM

## 2023-06-23 DIAGNOSIS — F40.240 CLAUSTROPHOBIA: ICD-10-CM

## 2023-06-23 DIAGNOSIS — S32.000S COMPRESSION FRACTURE OF LUMBAR VERTEBRA, UNSPECIFIED LUMBAR VERTEBRAL LEVEL, SEQUELA: ICD-10-CM

## 2023-06-23 DIAGNOSIS — D32.1 SPINAL MENINGIOMA (H): Primary | ICD-10-CM

## 2023-06-23 DIAGNOSIS — M48.02 CERVICAL STENOSIS OF SPINAL CANAL: ICD-10-CM

## 2023-06-23 PROCEDURE — 99214 OFFICE O/P EST MOD 30 MIN: CPT | Performed by: PHYSICIAN ASSISTANT

## 2023-06-23 RX ORDER — DIAZEPAM 5 MG
TABLET ORAL
Qty: 2 TABLET | Refills: 0 | Status: SHIPPED | OUTPATIENT
Start: 2023-06-23 | End: 2023-07-25

## 2023-06-23 ASSESSMENT — PAIN SCALES - GENERAL: PAINLEVEL: MODERATE PAIN (4)
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
## 2023-06-23 NOTE — PATIENT INSTRUCTIONS
A [facility] was [facility]  They will call you to schedule an appointment.  Ifyou do not hear from them within 72 hrs, please call [phone] to schedule an appointment.

## 2023-06-24 ENCOUNTER — MEDICAL CORRESPONDENCE (OUTPATIENT)
Dept: HEALTH INFORMATION MANAGEMENT | Facility: CLINIC | Age: 63
End: 2023-06-24
Payer: COMMERCIAL

## 2023-06-26 ENCOUNTER — TELEPHONE (OUTPATIENT)
Dept: FAMILY MEDICINE | Facility: CLINIC | Age: 63
End: 2023-06-26
Payer: COMMERCIAL

## 2023-06-26 DIAGNOSIS — Z85.850 HISTORY OF THYROID CANCER: Primary | ICD-10-CM

## 2023-06-26 DIAGNOSIS — E03.9 HYPOTHYROIDISM, UNSPECIFIED TYPE: ICD-10-CM

## 2023-06-26 NOTE — TELEPHONE ENCOUNTER
Prior Authorization Request  Who s requesting:  Pharmacy  Pharmacy Name and Location: [facility]   Medication Name: liraglutide - Weight Management (SAXENDA) 18 MG/3ML pen  Insurance Plan: [employer] MEDICARE ADVANTAGE  Insurance Member ID Number: [id number]  CoverMyMeds Key: [id number]  Informed patient that prior authorizations can take up to 10 business days for response:   NA  Okay to leave a detailed message: No     Route to pool:  [facility]

## 2023-06-27 ENCOUNTER — HOSPITAL ENCOUNTER (OUTPATIENT)
Dept: CARDIAC REHAB | Facility: HOSPITAL | Age: 63
Discharge: HOME | End: 2023-06-27
Attending: INTERNAL MEDICINE
Payer: COMMERCIAL

## 2023-06-27 ENCOUNTER — ANTICOAGULATION THERAPY VISIT (OUTPATIENT)
Dept: ANTICOAGULATION | Facility: CLINIC | Age: 63
End: 2023-06-27
Payer: COMMERCIAL

## 2023-06-27 DIAGNOSIS — I26.99 PULMONARY EMBOLISM WITH INFARCTION (H): Primary | ICD-10-CM

## 2023-06-27 DIAGNOSIS — Z79.01 LONG TERM (CURRENT) USE OF ANTICOAGULANTS: ICD-10-CM

## 2023-06-27 LAB — INR (EXTERNAL): 2.9 (ref 0.9–1.1)

## 2023-06-27 PROCEDURE — G0239 OTH RESP PROC, GROUP: HCPCS

## 2023-06-27 NOTE — PROGRESS NOTES
ANTICOAGULATION MANAGEMENT     Valentina Olmedo 63 year old female is on warfarin with therapeutic INR result. (Goal INR 2.0-3.0)    Recent labs: (last 7 days)     06/27/23  0000   INR 2.9*       ASSESSMENT       Source(s): Chart Review and Patient/Caregiver Call - Carla Zepeda  [phone]        Warfarin doses taken: Warfarin taken as instructed    Diet: No new diet changes identified    Medication/supplement changes: None noted    New illness, injury, or hospitalization: No    Signs or symptoms of bleeding or clotting: No    Previous result: Therapeutic last 2(+) visits    Additional findings: None         PLAN     Recommended plan for no diet, medication or health factor changes affecting INR     Dosing Instructions: Continue your current warfarin dose with next INR in 2 weeks       Summary  As of 6/27/2023    Full warfarin instructions:  12.5 mg every Tue, Fri; 10 mg all other days   Next INR check:  7/11/2023             Telephone call with Katelyn home care nurse who verbalizes understanding and agrees to plan    Orders given to  Homecare nurse/facility to recheck    Education provided:     Please call back if any changes to your diet, medications or how you've been taking warfarin    Plan made per [facility] anticoagulation protocol    Tosin Singh, RN  Anticoagulation Clinic  6/27/2023    _______________________________________________________________________     Anticoagulation Episode Summary     Current INR goal:  2.0-3.0   TTR:  46.2 % (1 y)   Target end date:  Indefinite   Send INR reminders to:  PIP[facility]    Indications    History of pulmonary embolism (Resolved) [Z86.711]  Pulmonary embolism with infarction (H) [I26.99]  Long term (current) use of anticoagulants [Z79.01]           Comments:           Anticoagulation Care Providers     Provider Role Specialty Phone number    Promise Vanegas MD Referring Family Medicine [phone]    Donald Rooney MD Referring Internal Medicine  [phone]    Jose Maria Morin MD [facility] [phone]

## 2023-06-28 NOTE — TELEPHONE ENCOUNTER
Central Prior Authorization Team   Phone: [phone]    PA Initiation    Medication: Saxenda  Insurance Company: [employer] Part D - Phone [phone] Fax [phone]  Pharmacy Filling the Rx: [facility] [place] [id number] - [place], [place] - [address] [place] [address] [address]  Filling Pharmacy Phone: [phone]  Filling Pharmacy Fax:    Start Date: 6/28/2023

## 2023-06-29 ENCOUNTER — MEDICAL CORRESPONDENCE (OUTPATIENT)
Dept: HEALTH INFORMATION MANAGEMENT | Facility: CLINIC | Age: 63
End: 2023-06-29
Payer: COMMERCIAL

## 2023-06-29 NOTE — TELEPHONE ENCOUNTER
PRIOR AUTHORIZATION DENIED    Medication: Saxenda    Denial Date: 6/29/2023    Denial Rational: Medication is excluded         Appeal Information: Review the plan's reasons for denial listed above. Please utilize that information to complete letter and provide specific, detailed clinical information/rationale of your patient's health status to address their denial reasons.

## 2023-07-05 ENCOUNTER — ANCILLARY PROCEDURE (OUTPATIENT)
Dept: RADIOLOGY | Facility: CLINIC | Age: 63
End: 2023-07-05
Payer: COMMERCIAL

## 2023-07-05 ENCOUNTER — TRANSFERRED RECORDS (OUTPATIENT)
Dept: HEALTH INFORMATION MANAGEMENT | Facility: CLINIC | Age: 63
End: 2023-07-05
Payer: COMMERCIAL

## 2023-07-05 NOTE — CONFIDENTIAL NOTE
Please let patient know that her Medicare insurance plan did not  coverage for the weight loss medication.  The patient and I discussed this possibility during her recent clinic visit.  During her next lab draw we will check a TSH.

## 2023-07-06 ENCOUNTER — HOSPITAL ENCOUNTER (OUTPATIENT)
Dept: CARDIAC REHAB | Facility: HOSPITAL | Age: 63
Discharge: HOME | End: 2023-07-06
Attending: INTERNAL MEDICINE
Payer: COMMERCIAL

## 2023-07-06 ENCOUNTER — OFFICE VISIT (OUTPATIENT)
Dept: NEUROSURGERY | Facility: CLINIC | Age: 63
End: 2023-07-06
Attending: PHYSICIAN ASSISTANT
Payer: COMMERCIAL

## 2023-07-06 VITALS — OXYGEN SATURATION: 96 % | HEART RATE: 91 BPM | DIASTOLIC BLOOD PRESSURE: 67 MMHG | SYSTOLIC BLOOD PRESSURE: 121 MMHG

## 2023-07-06 DIAGNOSIS — M48.02 CERVICAL STENOSIS OF SPINAL CANAL: ICD-10-CM

## 2023-07-06 DIAGNOSIS — E66.813 CLASS 3 SEVERE OBESITY WITH BODY MASS INDEX (BMI) OF 50.0 TO 59.9 IN ADULT, UNSPECIFIED OBESITY TYPE, UNSPECIFIED WHETHER SERIOUS COMORBIDITY PRESENT (H): Primary | ICD-10-CM

## 2023-07-06 DIAGNOSIS — E66.01 CLASS 3 SEVERE OBESITY WITH BODY MASS INDEX (BMI) OF 50.0 TO 59.9 IN ADULT, UNSPECIFIED OBESITY TYPE, UNSPECIFIED WHETHER SERIOUS COMORBIDITY PRESENT (H): Primary | ICD-10-CM

## 2023-07-06 PROCEDURE — G0239 OTH RESP PROC, GROUP: HCPCS

## 2023-07-06 PROCEDURE — 99204 OFFICE O/P NEW MOD 45 MIN: CPT | Performed by: SURGERY

## 2023-07-06 ASSESSMENT — PAIN SCALES - GENERAL: PAINLEVEL: WORST PAIN (10)

## 2023-07-06 NOTE — PROGRESS NOTES
NEUROSURGERY CONSULTATION NOTE      Neurosurgery was asked to see this patient by Lou Swanson PA-C for evaluation of cervical stenosis.       CONSULTATION ASSESSMENT AND PLAN:    62 yo female who presents with diffuse pain in axial spine and extremities, weakness, numbness and imbalance. Does have moderate to severe spinal canal stenosis at cervical 4-7 but without any cord signal change on cervical MRI. No cervical xray to review at this time. Her lumbar MRI shows spinal stenosis at lumbar 4-5 with bilateral foraminal narrowing.Also has foraminal narrowing at right lumbar 5-sacral 1. Her BMI is currently 55.49 which puts her at significant risk of undergoing elective spinal procedures at this time. She is currently having no acute worsening of symptoms and has good strength on examination. Recommend weight loss prior to consideration of elective spinal surgery with a goal of BMI < 40. Comprehensive weight loss management consult placed. She will return to clinic for new or worsening neurological symptoms otherwise follow up in 3-4 months.     Gracie Jacobsen MD      HPI:  62 yo female who presents with diffuse pain in axial spine and extremities, weakness, numbness and imbalance. Pain in her whole body. Stuck in a wheel chair due to limited walking. Her lower back pain is from a prior lumbar fracture per patient which causes ongoing pain.  She has diffuse weakness and bone weakness. Known osteoporosis per patient. On prolia. Finger and toes and planter surface of her feet numb. She has history of polyneuropathy. This can radiate up her legs at time to her shins. Pain in her arms and legs which she feels like she wants to shake off. Pain is fairly diffuse in her shoulders down the arm. Right leg is perhaps more posterolateral thigh. Left leg is diffuse down the leg. Constipated without incontinence. No urinary incontinence currently. Has fallen several times. Ongoing imbalance with limited walking. One times  she had fallen multiple times in one day requiring ems assistance to get up.     Occupational and physical therapy- does not seem to improve her symptoms. Can ambulate with a walker short distances only.   Tylenol, Cymbalta, tizanidine. Tylenol does not help her at all with her pain.     BMI 55.49. she is currently trying to lose weight.       Past Medical History:   Diagnosis Date     Adrenal mass (H) 10/12/2015    Chen Null MD  They were incidentally discovered on the CAT scan of the abdomen from December 2011 which was done for evaluation of kidney stones. F/up CT of the abdomen done on 6/26/12 showed a stable 5 mm nodular opacity in the right lower lung lobe, adjacent to the diagram. 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 According to the patient, the tumor was benign. She is not sure whether or not the tumor belonged to the thyroid gland. The surgery was done here at the [facility]. In January 2011, she was diagnosed with kidney stones. She was found to have an elevated calcium level of 11.7, with a PTH level of 368. Stone an     Primary hyperparathyroidism (H)            PTSD (post-traumatic stress disorder)      Pulmonary embolism with infarction (H) 1/12/2021     Recurrent otitis media      Seizure disorder (H)      Stroke (H)      Vertigo        Past Surgical History:   Procedure Laterality Date     ABDOMEN SURGERY       ADRENALECTOMY Left      BRAIN SURGERY       CRANIOTOMY FOR TEMPORAL LOBECTOMY Right      x3 for seizure     DILATION AND CURETTAGE       HEAD & NECK SURGERY       HYSTERECTOMY, PAP NO LONGER INDICATED N/A      LITHOTRIPSY       PARATHYROID GLAND SURGERY      resection of adenoma     REFRACTIVE SURGERY Bilateral      RELEASE CARPAL TUNNEL Bilateral      TONSILLECTOMY & ADENOIDECTOMY       TOTAL THYROIDECTOMY       TOTAL VAGINAL HYSTERECTOMY      39 years old. Benign       REVIEW OF SYSTEMS:  See ROS form under media     MEDICATIONS:  Current Outpatient Medications   Medication Sig Dispense Refill     albuterol (PROAIR HFA) 108 (90 Base) MCG/ACT inhaler Inhale 2 puffs into the lungs every 4 hours as needed for shortness of breath / dyspnea or wheezing 8 g 4     Brivaracetam (BRIVIACT) 100 MG tablet Take 150 mg by mouth 2 times daily        Calcium Citrate-Vitamin D (CALCIUM + D PO) Take 1 tablet by mouth daily        carBAMazepine (TEGRETOL) 200 MG tablet 200mg in the  at lunch and 400mg at HS       Cyanocobalamin (VITAMIN B12) 1000 MCG TBCR Take 1 tablet by mouth       diazepam (VALIUM) 5 MG tablet Take 1 tab 2 hrs before MRI, take 1 tab 1 hr before MRI only if needed 2 tablet 0     DULoxetine (CYMBALTA) 60 MG capsule Take 2 capsules (120 mg) by mouth daily 180 capsule 3     EPINEPHrine (ADRENACLICK JR) 0.15 MG/0.15ML injection 2-pack Inject 0.15 mLs (0.15 mg) into the muscle as needed for anaphylaxis 2 each 1     EPINEPHrine (ANY BX GENERIC EQUIV) 0.3 MG/0.3ML injection 2-pack Inject 0.3 mLs (0.3 mg) into the muscle as needed for anaphylaxis 2 each 1     esomeprazole (NEXIUM) 40 MG DR capsule Take 1 capsule (40 mg) by mouth every morning (before breakfast) Take 30-60 minutes before eating. 90 capsule 3     fexofenadine (ALLEGRA) 180 MG tablet Take 1 tablet (180 mg) by mouth daily 90 tablet 3     fluticasone (FLONASE) 50 MCG/ACT nasal spray INHALE 1 SPRAY IN EACH NOSTRIL DAILY Strength: 50 MCG/ACT 16 g 11     levothyroxine (SYNTHROID/LEVOTHROID) 300 MCG tablet Take 1 tablet (300 mcg) by mouth daily  90 tablet 3     losartan (COZAAR) 25 MG tablet Take 0.5 tablets (12.5 mg) by mouth daily 90 tablet 1     meclizine (ANTIVERT) 25 MG tablet TAKE 1/2-1 TABLET BY MOUTH BY MOUTH THREE TIMES A DAY AS NEEDED FOR DIZZINESS 30 tablet 11     multivitamin with iron (CHROMAGEN) TABS half-tab Take 1 tablet by mouth daily       nicotine (NICOTROL) 10 MG inhaler Use 1 cartridge as needed for urge to smoke by puffing over course of 20min.  Use 6-16 cart/day; reduce number of cart/day over 6-12 weeks. 168 each 1     Nutritional Supplements (NUTRITIONAL SHAKE HIGH PROTEIN) LIQD Take 1 each by mouth every morning Dispense Premier protein shakes. Replace breakfast with 1 shake daily. 9900 mL 11     olopatadine (PATADAY) 0.2 % ophthalmic solution Place 1 drop into both eyes daily 2.5 mL 5     oxybutynin ER (DITROPAN XL) 10 MG 24 hr tablet Take 1 tablet (10 mg) by mouth daily 90 tablet 3     Polyethylene Glycol POWD See Admin Instructions Use daily as needed       QUEtiapine Fumarate 150 MG TABS Take 150 mg by mouth At Bedtime 90 tablet 3     rosuvastatin (CRESTOR) 20 MG tablet Take 1 tablet (20 mg) by mouth daily 90 tablet 3     salmeterol (SEREVENT) 50 MCG/ACT inhaler Inhale 1 puff into the lungs 2 times daily 180 each 3     SENNA-TIME 8.6 MG tablet TAKE 2 TABLETS (17.2MG) BY MOUTH TWICE A  tablet 3     SUMAtriptan (IMITREX) 50 MG tablet Take 1 tablet (50 mg) by mouth at onset of headache for migraine May repeat in 2 hours. Max 4 tablets/24 hours. 9 tablet 1     tiZANidine (ZANAFLEX) 4 MG tablet Take 1 tablet (4 mg) by mouth 3 times daily 42 tablet 3     topiramate (TOPAMAX) 50 MG tablet Take 1 tablet (50 mg) by mouth At Bedtime 90 tablet 3     varenicline (CHANTIX) 1 MG tablet TAKE 1 TABLET BY MOUTH TWICE A DAY 56 tablet 2     Vibegron (GEMTESA) 75 MG TABS tablet Gemtesa 75 mg tablet       vitamin D3 (CHOLECALCIFEROL) 50 mcg (2000 units) tablet Take 2 tablets (100 mcg) by mouth daily 180 tablet 3     warfarin ANTICOAGULANT  (COUMADIN) 5 MG tablet TAKE 2 TABLETS BY MOUTH ON MON AND FRI ; TAKE 2 & 1/2 TABLETS BY MOUTH ALL OTHER DAYS AS DIRECTED BY INR CLINIC 180 tablet 3         ALLERGIES/SENSITIVITIES:     Allergies   Allergen Reactions     Aspirin Shortness Of Breath     Bees Anaphylaxis     Lamictal [Lamotrigine] Dizziness     Latex Itching     Phenobarbital      Vistaril [Hydroxyzine] Other (See Comments)     Sluggish, unable to wake up     Gabapentin Rash       PERTINENT SOCIAL HISTORY:   Social History     Socioeconomic History     Marital status:    Tobacco Use     Smoking status: Former     Smokeless tobacco: Never     Tobacco comments:     Quit about 2 months ago    Vaping Use     Vaping Use: Never used   Substance and Sexual Activity     Alcohol use: No     Drug use: No     Sexual activity: Not Currently         FAMILY HISTORY:  Family History   Problem Relation Age of Onset     Chronic Obstructive Pulmonary Disease Mother      Other - See Comments Father         estranged     Skin Cancer Father      Lung Cancer Maternal Grandfather 76     Other - See Comments Brother         Missing since 1992     Alcoholism Brother      Diabetes Sister      Depression Sister      Alcoholism Sister      No Known Problems Sister         Half sister        PHYSICAL EXAM:   Constitutional: /67   Pulse 91   LMP  (LMP Unknown)   SpO2 96%      Mental Status: A & O in no acute distress.  Affect is appropriate.  Speech is fluent.  Recent and remote memory are intact.  Attention span and concentration are normal.     Motor:  Normal bulk and tone all muscle groups of upper and lower extremities.    Strength: 5/5 x 4     Sensory: Sensation intact bilaterally to light touch except very slight numbness in distal extremities      Coordination: in wheel chair      Reflexes; supinator, biceps, triceps, knee/ ankle jerk intact 1-2+/4. No tamayo's    IMAGING:  I personally reviewed all radiographic images         Cc:   Jose Maria Morin

## 2023-07-06 NOTE — NURSING NOTE
"Valentina Olmedo is a 63 year old female who presents for:  Chief Complaint   Patient presents with     Consult     Neck pain  Hand pain  Weakness, numbness tingling in arms  Low back pain  Weakness numbness in toes  Headache  MRI review  History of spinal fractures  History of osteoporosis        Initial Vitals:  /67   Pulse 91   LMP  (LMP Unknown)   SpO2 96%  Estimated body mass index is 55.49 kg/m  as calculated from the following:    Height as of 6/23/23: 5' 6.5\" (1.689 m).    Weight as of 6/23/23: 349 lb (158.3 kg).. There is no height or weight on file to calculate BSA. BP completed using cuff size: regular  Worst Pain (10)      Mitch Beal  "

## 2023-07-06 NOTE — LETTER
7/6/2023         RE: Valentina Olmedo  [address]  [facility] [address]        Dear Colleague,    Thank you for referring your patient, Valentina Olmedo, to the [facility]. Please see a copy of my visit note below.    NEUROSURGERY CONSULTATION NOTE      Neurosurgery was asked to see this patient by Lou Swanson PA-C for evaluation of cervical stenosis.       CONSULTATION ASSESSMENT AND PLAN:    64 yo female who presents with diffuse pain in axial spine and extremities, weakness, numbness and imbalance. Does have moderate to severe spinal canal stenosis at cervical 4-7 but without any cord signal change on cervical MRI. No cervical xray to review at this time. Her lumbar MRI shows spinal stenosis at lumbar 4-5 with bilateral foraminal narrowing.Also has foraminal narrowing at right lumbar 5-sacral 1. Her BMI is currently 55.49 which puts her at significant risk of undergoing elective spinal procedures at this time. She is currently having no acute worsening of symptoms and has good strength on examination. Recommend weight loss prior to consideration of elective spinal surgery with a goal of BMI < 40. Comprehensive weight loss management consult placed. She will return to clinic for new or worsening neurological symptoms otherwise follow up in 3-4 months.     Gracie Jacobsen MD      HPI:  64 yo female who presents with diffuse pain in axial spine and extremities, weakness, numbness and imbalance. Pain in her whole body. Stuck in a wheel chair due to limited walking. Her lower back pain is from a prior lumbar fracture per patient which causes ongoing pain.  She has diffuse weakness and bone weakness. Known osteoporosis per patient. On prolia. Finger and toes and planter surface of her feet numb. She has history of polyneuropathy. This can radiate up her legs at time to her shins. Pain in her arms and legs which she feels like she wants to shake off. Pain is fairly  diffuse in her shoulders down the arm. Right leg is perhaps more posterolateral thigh. Left leg is diffuse down the leg. Constipated without incontinence. No urinary incontinence currently. Has fallen several times. Ongoing imbalance with limited walking. One times she had fallen multiple times in one day requiring ems assistance to get up.     Occupational and physical therapy- does not seem to improve her symptoms. Can ambulate with a walker short distances only.   Tylenol, Cymbalta, tizanidine. Tylenol does not help her at all with her pain.     BMI 55.49. she is currently trying to lose weight.       Past Medical History:   Diagnosis Date     Adrenal mass (H) 10/12/2015    Chen Null MD  They were incidentally discovered on the CAT scan of the abdomen from December 2011 which was done for evaluation of kidney stones. F/up CT of the abdomen done on 6/26/12 showed a stable 5 mm nodular opacity in the right lower lung lobe, adjacent to the diagram. 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 According to the patient, the tumor was benign. She is not sure whether or not the tumor belonged to the thyroid gland. The surgery was done here at the [facility]. In January 2011, she was diagnosed with kidney stones. She was found to have an elevated calcium level of 11.7, with a PTH level of 368. Stone an     Primary hyperparathyroidism (H)            PTSD (post-traumatic stress disorder)      Pulmonary embolism with infarction  (H) 1/12/2021     Recurrent otitis media      Seizure disorder (H)      Stroke (H)      Vertigo        Past Surgical History:   Procedure Laterality Date     ABDOMEN SURGERY       ADRENALECTOMY Left      BRAIN SURGERY       CRANIOTOMY FOR TEMPORAL LOBECTOMY Right     x3 for seizure     DILATION AND CURETTAGE       HEAD & NECK SURGERY       HYSTERECTOMY, PAP NO LONGER INDICATED N/A      LITHOTRIPSY       PARATHYROID GLAND SURGERY      resection of adenoma     REFRACTIVE SURGERY Bilateral      RELEASE CARPAL TUNNEL Bilateral      TONSILLECTOMY & ADENOIDECTOMY       TOTAL THYROIDECTOMY       TOTAL VAGINAL HYSTERECTOMY      39 years old. Benign       REVIEW OF SYSTEMS:  See ROS form under media     MEDICATIONS:  Current Outpatient Medications   Medication Sig Dispense Refill     albuterol (PROAIR HFA) 108 (90 Base) MCG/ACT inhaler Inhale 2 puffs into the lungs every 4 hours as needed for shortness of breath / dyspnea or wheezing 8 g 4     Brivaracetam (BRIVIACT) 100 MG tablet Take 150 mg by mouth 2 times daily        Calcium Citrate-Vitamin D (CALCIUM + D PO) Take 1 tablet by mouth daily        carBAMazepine (TEGRETOL) 200 MG tablet 200mg in the  at lunch and 400mg at HS       Cyanocobalamin (VITAMIN B12) 1000 MCG TBCR Take 1 tablet by mouth       diazepam (VALIUM) 5 MG tablet Take 1 tab 2 hrs before MRI, take 1 tab 1 hr before MRI only if needed 2 tablet 0     DULoxetine (CYMBALTA) 60 MG capsule Take 2 capsules (120 mg) by mouth daily 180 capsule 3     EPINEPHrine (ADRENACLICK JR) 0.15 MG/0.15ML injection 2-pack Inject 0.15 mLs (0.15 mg) into the muscle as needed for anaphylaxis 2 each 1     EPINEPHrine (ANY BX GENERIC EQUIV) 0.3 MG/0.3ML injection 2-pack Inject 0.3 mLs (0.3 mg) into the muscle as needed for anaphylaxis 2 each 1     esomeprazole (NEXIUM) 40 MG DR capsule Take 1 capsule (40 mg) by mouth every morning (before breakfast) Take 30-60 minutes before eating. 90 capsule 3     fexofenadine (ALLEGRA) 180  MG tablet Take 1 tablet (180 mg) by mouth daily 90 tablet 3     fluticasone (FLONASE) 50 MCG/ACT nasal spray INHALE 1 SPRAY IN EACH NOSTRIL DAILY Strength: 50 MCG/ACT 16 g 11     levothyroxine (SYNTHROID/LEVOTHROID) 300 MCG tablet Take 1 tablet (300 mcg) by mouth daily 90 tablet 3     losartan (COZAAR) 25 MG tablet Take 0.5 tablets (12.5 mg) by mouth daily 90 tablet 1     meclizine (ANTIVERT) 25 MG tablet TAKE 1/2-1 TABLET BY MOUTH BY MOUTH THREE TIMES A DAY AS NEEDED FOR DIZZINESS 30 tablet 11     multivitamin with iron (CHROMAGEN) TABS half-tab Take 1 tablet by mouth daily       nicotine (NICOTROL) 10 MG inhaler Use 1 cartridge as needed for urge to smoke by puffing over course of 20min.  Use 6-16 cart/day; reduce number of cart/day over 6-12 weeks. 168 each 1     Nutritional Supplements (NUTRITIONAL SHAKE HIGH PROTEIN) LIQD Take 1 each by mouth every morning Dispense Premier protein shakes. Replace breakfast with 1 shake daily. 9900 mL 11     olopatadine (PATADAY) 0.2 % ophthalmic solution Place 1 drop into both eyes daily 2.5 mL 5     oxybutynin ER (DITROPAN XL) 10 MG 24 hr tablet Take 1 tablet (10 mg) by mouth daily 90 tablet 3     Polyethylene Glycol POWD See Admin Instructions Use daily as needed       QUEtiapine Fumarate 150 MG TABS Take 150 mg by mouth At Bedtime 90 tablet 3     rosuvastatin (CRESTOR) 20 MG tablet Take 1 tablet (20 mg) by mouth daily 90 tablet 3     salmeterol (SEREVENT) 50 MCG/ACT inhaler Inhale 1 puff into the lungs 2 times daily 180 each 3     SENNA-TIME 8.6 MG tablet TAKE 2 TABLETS (17.2MG) BY MOUTH TWICE A  tablet 3     SUMAtriptan (IMITREX) 50 MG tablet Take 1 tablet (50 mg) by mouth at onset of headache for migraine May repeat in 2 hours. Max 4 tablets/24 hours. 9 tablet 1     tiZANidine (ZANAFLEX) 4 MG tablet Take 1 tablet (4 mg) by mouth 3 times daily 42 tablet 3     topiramate (TOPAMAX) 50 MG tablet Take 1 tablet (50 mg) by mouth At Bedtime 90 tablet 3     varenicline  (CHANTIX) 1 MG tablet TAKE 1 TABLET BY MOUTH TWICE A DAY 56 tablet 2     Vibegron (GEMTESA) 75 MG TABS tablet Gemtesa 75 mg tablet       vitamin D3 (CHOLECALCIFEROL) 50 mcg (2000 units) tablet Take 2 tablets (100 mcg) by mouth daily 180 tablet 3     warfarin ANTICOAGULANT (COUMADIN) 5 MG tablet TAKE 2 TABLETS BY MOUTH ON MON AND FRI ; TAKE 2 & 1/2 TABLETS BY MOUTH ALL OTHER DAYS AS DIRECTED BY INR CLINIC 180 tablet 3         ALLERGIES/SENSITIVITIES:     Allergies   Allergen Reactions     Aspirin Shortness Of Breath     Bees Anaphylaxis     Lamictal [Lamotrigine] Dizziness     Latex Itching     Phenobarbital      Vistaril [Hydroxyzine] Other (See Comments)     Sluggish, unable to wake up     Gabapentin Rash       PERTINENT SOCIAL HISTORY:   Social History     Socioeconomic History     Marital status:    Tobacco Use     Smoking status: Former     Smokeless tobacco: Never     Tobacco comments:     Quit about 2 months ago    Vaping Use     Vaping Use: Never used   Substance and Sexual Activity     Alcohol use: No     Drug use: No     Sexual activity: Not Currently         FAMILY HISTORY:  Family History   Problem Relation Age of Onset     Chronic Obstructive Pulmonary Disease Mother      Other - See Comments Father         estranged     Skin Cancer Father      Lung Cancer Maternal Grandfather 76     Other - See Comments Brother         Missing since 1992     Alcoholism Brother      Diabetes Sister      Depression Sister      Alcoholism Sister      No Known Problems Sister         Half sister        PHYSICAL EXAM:   Constitutional: /67   Pulse 91   LMP  (LMP Unknown)   SpO2 96%      Mental Status: A & O in no acute distress.  Affect is appropriate.  Speech is fluent.  Recent and remote memory are intact.  Attention span and concentration are normal.     Motor:  Normal bulk and tone all muscle groups of upper and lower extremities.    Strength: 5/5 x 4     Sensory: Sensation intact bilaterally to light  touch except very slight numbness in distal extremities      Coordination: in wheel chair      Reflexes; supinator, biceps, triceps, knee/ ankle jerk intact 1-2+/4. No tamayo's    IMAGING:  I personally reviewed all radiographic images         Cc:   Jose Maria Morin               Again, thank you for allowing me to participate in the care of your patient.        Sincerely,        Gracie Jacobsen MD

## 2023-07-11 ENCOUNTER — MEDICAL CORRESPONDENCE (OUTPATIENT)
Dept: HEALTH INFORMATION MANAGEMENT | Facility: CLINIC | Age: 63
End: 2023-07-11

## 2023-07-11 ENCOUNTER — ANTICOAGULATION THERAPY VISIT (OUTPATIENT)
Dept: ANTICOAGULATION | Facility: CLINIC | Age: 63
End: 2023-07-11
Payer: COMMERCIAL

## 2023-07-11 ENCOUNTER — HOSPITAL ENCOUNTER (OUTPATIENT)
Dept: CARDIAC REHAB | Facility: HOSPITAL | Age: 63
Discharge: HOME | End: 2023-07-11
Attending: INTERNAL MEDICINE
Payer: COMMERCIAL

## 2023-07-11 DIAGNOSIS — R42 VERTIGO: ICD-10-CM

## 2023-07-11 DIAGNOSIS — I26.99 PULMONARY EMBOLISM WITH INFARCTION (H): Primary | ICD-10-CM

## 2023-07-11 DIAGNOSIS — Z79.01 LONG TERM (CURRENT) USE OF ANTICOAGULANTS: ICD-10-CM

## 2023-07-11 LAB — INR (EXTERNAL): 2.1 (ref 0.9–1.1)

## 2023-07-11 PROCEDURE — G0239 OTH RESP PROC, GROUP: HCPCS

## 2023-07-11 RX ORDER — MECLIZINE HYDROCHLORIDE 25 MG/1
TABLET ORAL
Qty: 30 TABLET | Refills: 11 | Status: SHIPPED | OUTPATIENT
Start: 2023-07-11 | End: 2023-11-14

## 2023-07-11 NOTE — PROGRESS NOTES
ANTICOAGULATION MANAGEMENT     Valentina Olmedo 63 year old female is on warfarin with therapeutic INR result. (Goal INR 2.0-3.0)    Recent labs: (last 7 days)     07/11/23  1505   INR 2.1*       ASSESSMENT       Source(s): Chart Review and Home Care/Facility Nurse       Warfarin doses taken: Warfarin taken as instructed    Diet: No new diet changes identified    Medication/supplement changes: None noted    New illness, injury, or hospitalization: No    Signs or symptoms of bleeding or clotting: No    Previous result: Therapeutic last 2(+) visits    Additional findings: None         PLAN     Recommended plan for no diet, medication or health factor changes affecting INR     Dosing Instructions: Continue your current warfarin dose with next INR in 2 weeks       Summary  As of 7/11/2023    Full warfarin instructions:  12.5 mg every Tue, Fri; 10 mg all other days   Next INR check:  7/25/2023             Telephone call with Carla Zepeda home care nurse who agrees to plan and repeated back plan correctly    Orders given to  Homecare nurse/facility to recheck    Education provided:     None required    Plan made per [facility] anticoagulation protocol    Kindra Garay, RN  Anticoagulation Clinic  7/11/2023    _______________________________________________________________________     Anticoagulation Episode Summary     Current INR goal:  2.0-3.0   TTR:  46.3 % (1 y)   Target end date:  Indefinite   Send INR reminders to:  [facility]    Indications    History of pulmonary embolism (Resolved) [Z86.711]  Pulmonary embolism with infarction (H) [I26.99]  Long term (current) use of anticoagulants [Z79.01]           Comments:           Anticoagulation Care Providers     Provider Role Specialty Phone number    Promise Vanegas MD Referring Family Medicine [phone]    Donald Rooney MD Referring Internal Medicine [phone]    Jose Maria Morin MD Referring Family Medicine [phone]

## 2023-07-11 NOTE — TELEPHONE ENCOUNTER
"Last Written Prescription Date: 2/22/23  Last Fill Quantity: 30, # refills: 11  Last office visit provider: 6/22/23        Requested Prescriptions   Pending Prescriptions Disp Refills     meclizine (ANTIVERT) 25 MG tablet [Pharmacy Med Name: Meclizine HCl 25MG TABS] 30 tablet 11     Sig: TAKE 1/2-1 TABLET BY MOUTH BY MOUTH THREE TIMES A DAY AS NEEDED FOR DIZZINESS        Antivertigo/Antiemetic Agents Passed - 7/11/2023  3:42 PM        Passed - Recent (12 mo) or future (30 days) visit within the authorizing provider's specialty     Patient has had an office visit with the authorizing provider or a provider within the authorizing providers department within the previous 12 mos or has a future within next 30 days. See \"Patient Info\" tab in inbasket, or \"Choose Columns\" in Meds & Orders section of the refill encounter.              Passed - Medication is active on med list        Passed - Patient is 18 years of age or older                 Chen Paz RN 07/11/23 3:46 PM  "

## 2023-07-17 ENCOUNTER — MEDICAL CORRESPONDENCE (OUTPATIENT)
Dept: HEALTH INFORMATION MANAGEMENT | Facility: CLINIC | Age: 63
End: 2023-07-17

## 2023-07-18 ENCOUNTER — HOSPITAL ENCOUNTER (OUTPATIENT)
Dept: CARDIAC REHAB | Facility: HOSPITAL | Age: 63
Discharge: HOME | End: 2023-07-18
Attending: INTERNAL MEDICINE
Payer: COMMERCIAL

## 2023-07-18 PROCEDURE — G0239 OTH RESP PROC, GROUP: HCPCS

## 2023-07-25 ENCOUNTER — RELEASE OF INFORMATION (OUTPATIENT)
Dept: PSYCHIATRY | Facility: CLINIC | Age: 63
End: 2023-07-25
Payer: COMMERCIAL

## 2023-07-25 ENCOUNTER — TELEPHONE (OUTPATIENT)
Dept: PHYSICAL MEDICINE AND REHAB | Facility: CLINIC | Age: 63
End: 2023-07-25
Payer: COMMERCIAL

## 2023-07-25 ENCOUNTER — ANTICOAGULATION THERAPY VISIT (OUTPATIENT)
Dept: ANTICOAGULATION | Facility: CLINIC | Age: 63
End: 2023-07-25
Payer: COMMERCIAL

## 2023-07-25 ENCOUNTER — OFFICE VISIT (OUTPATIENT)
Dept: PSYCHIATRY | Facility: CLINIC | Age: 63
End: 2023-07-25
Payer: COMMERCIAL

## 2023-07-25 VITALS
BODY MASS INDEX: 45.99 KG/M2 | HEIGHT: 67 IN | SYSTOLIC BLOOD PRESSURE: 147 MMHG | DIASTOLIC BLOOD PRESSURE: 77 MMHG | OXYGEN SATURATION: 98 % | HEART RATE: 87 BPM | WEIGHT: 293 LBS

## 2023-07-25 DIAGNOSIS — Z79.01 LONG TERM (CURRENT) USE OF ANTICOAGULANTS: ICD-10-CM

## 2023-07-25 DIAGNOSIS — F33.1 MAJOR DEPRESSIVE DISORDER, RECURRENT EPISODE, MODERATE (H): Primary | ICD-10-CM

## 2023-07-25 DIAGNOSIS — Z59.9 PROBLEM RELATED TO HOUSING AND ECONOMIC CIRCUMSTANCES: ICD-10-CM

## 2023-07-25 DIAGNOSIS — E66.01 CLASS 3 SEVERE OBESITY DUE TO EXCESS CALORIES WITH SERIOUS COMORBIDITY AND BODY MASS INDEX (BMI) OF 50.0 TO 59.9 IN ADULT (H): ICD-10-CM

## 2023-07-25 DIAGNOSIS — D32.1 SPINAL MENINGIOMA (H): Primary | ICD-10-CM

## 2023-07-25 DIAGNOSIS — I26.99 PULMONARY EMBOLISM WITH INFARCTION (H): Primary | ICD-10-CM

## 2023-07-25 DIAGNOSIS — E66.813 CLASS 3 SEVERE OBESITY DUE TO EXCESS CALORIES WITH SERIOUS COMORBIDITY AND BODY MASS INDEX (BMI) OF 50.0 TO 59.9 IN ADULT (H): ICD-10-CM

## 2023-07-25 LAB — INR (EXTERNAL): 2.3 (ref 0.9–1.1)

## 2023-07-25 PROCEDURE — 99215 OFFICE O/P EST HI 40 MIN: CPT | Performed by: PSYCHIATRY & NEUROLOGY

## 2023-07-25 RX ORDER — LANOLIN ALCOHOL/MO/W.PET/CERES
3 CREAM (GRAM) TOPICAL
Qty: 90 TABLET | Refills: 1 | Status: SHIPPED | OUTPATIENT
Start: 2023-07-25 | End: 2024-02-27

## 2023-07-25 RX ORDER — PRAZOSIN HYDROCHLORIDE 1 MG/1
1 CAPSULE ORAL AT BEDTIME
Qty: 90 CAPSULE | Refills: 1 | Status: SHIPPED | OUTPATIENT
Start: 2023-07-25 | End: 2023-10-17 | Stop reason: DRUGHIGH

## 2023-07-25 RX ORDER — QUETIAPINE FUMARATE 50 MG/1
TABLET, FILM COATED ORAL
Qty: 30 TABLET | Refills: 0 | Status: SHIPPED | OUTPATIENT
Start: 2023-07-25 | End: 2023-10-17 | Stop reason: DRUGHIGH

## 2023-07-25 SDOH — ECONOMIC STABILITY - INCOME SECURITY: PROBLEM RELATED TO HOUSING AND ECONOMIC CIRCUMSTANCES, UNSPECIFIED: Z59.9

## 2023-07-25 ASSESSMENT — ANXIETY QUESTIONNAIRES
4. TROUBLE RELAXING: MORE THAN HALF THE DAYS
GAD7 TOTAL SCORE: 14
GAD7 TOTAL SCORE: 14
7. FEELING AFRAID AS IF SOMETHING AWFUL MIGHT HAPPEN: MORE THAN HALF THE DAYS
IF YOU CHECKED OFF ANY PROBLEMS ON THIS QUESTIONNAIRE, HOW DIFFICULT HAVE THESE PROBLEMS MADE IT FOR YOU TO DO YOUR WORK, TAKE CARE OF THINGS AT HOME, OR GET ALONG WITH OTHER PEOPLE: VERY DIFFICULT
6. BECOMING EASILY ANNOYED OR IRRITABLE: NEARLY EVERY DAY
3. WORRYING TOO MUCH ABOUT DIFFERENT THINGS: MORE THAN HALF THE DAYS
1. FEELING NERVOUS, ANXIOUS, OR ON EDGE: MORE THAN HALF THE DAYS
5. BEING SO RESTLESS THAT IT IS HARD TO SIT STILL: SEVERAL DAYS
2. NOT BEING ABLE TO STOP OR CONTROL WORRYING: MORE THAN HALF THE DAYS

## 2023-07-25 ASSESSMENT — PATIENT HEALTH QUESTIONNAIRE - PHQ9: SUM OF ALL RESPONSES TO PHQ QUESTIONS 1-9: 15

## 2023-07-25 NOTE — PROGRESS NOTES
ANTICOAGULATION MANAGEMENT     Valentina Olmedo 63 year old female is on warfarin with therapeutic INR result. (Goal INR 2.0-3.0)    Recent labs: (last 7 days)     07/25/23  1323   INR 2.3*       ASSESSMENT     Source(s): Chart Review and Home Care/Facility Nurse     Warfarin doses taken: Warfarin taken as instructed  Diet: No new diet changes identified  Medication/supplement changes:  decrease seroquel starting  prazosin 1mg  New illness, injury, or hospitalization: No  Signs or symptoms of bleeding or clotting: No  Previous result: Therapeutic last 2(+) visits  Additional findings: None       PLAN     Recommended plan for ongoing change(s) affecting INR     Dosing Instructions: Continue your current warfarin dose with next INR in 2 weeks       Summary  As of 7/25/2023      Full warfarin instructions:  12.5 mg every Tue, Fri; 10 mg all other days   Next INR check:  8/8/2023               Telephone call with Carla Zepeda home care nurse who agrees to plan and repeated back plan correctly    Orders given to  Homecare nurse/facility to recheck    Education provided:   None required    Plan made per [facility] anticoagulation protocol    Kindra Garay, RN  Anticoagulation Clinic  7/25/2023    _______________________________________________________________________     Anticoagulation Episode Summary       Current INR goal:  2.0-3.0   TTR:  47.9 % (1 y)   Target end date:  Indefinite   Send INR reminders to:  [facility]    Indications    History of pulmonary embolism (Resolved) [Z86.711]  Pulmonary embolism with infarction (H) [I26.99]  Long term (current) use of anticoagulants [Z79.01]             Comments:               Anticoagulation Care Providers       Provider Role Specialty Phone number    Promise Vanegas MD Referring Family Medicine [phone]    Donald Rooney MD Referring Internal Medicine [phone]    Jose Maria Morin MD Referring Family Medicine [phone]
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
## 2023-07-25 NOTE — TELEPHONE ENCOUNTER
----- Message from Lou Swanson PA-C sent at 7/25/2023  7:11 AM CDT -----  Regarding: follow up  Please call this patient and let her know that we reviewed her case at spine rounds today regarding that spot that was seen on CT, but not clearly visualized on her MRI.  The thought is that this is either some calcification of the ligament or a benign mass called a meningioma (which we discussed at her office visit).  The recommendation is to repeat the CT cervical spine in one year to see if it changes.  The order for the repeat CT cervical spine is in.
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
## 2023-07-27 ENCOUNTER — DOCUMENTATION ONLY (OUTPATIENT)
Dept: ANTICOAGULATION | Facility: CLINIC | Age: 63
End: 2023-07-27
Payer: COMMERCIAL

## 2023-07-27 DIAGNOSIS — Z86.711 HISTORY OF PULMONARY EMBOLISM: Primary | ICD-10-CM

## 2023-07-27 DIAGNOSIS — I26.99 PULMONARY EMBOLISM WITH INFARCTION (H): ICD-10-CM

## 2023-07-27 NOTE — PROGRESS NOTES
ANTICOAGULATION CLINIC REFERRAL RENEWAL REQUEST       An annual renewal order is required for all patients referred to [facility].?  Please review and sign the pended referral order for Valentina Olmedo.       ANTICOAGULATION SUMMARY      Warfarin indication(s)   PE    Mechanical heart valve present?  NO       Current goal range   INR: 2.0-3.0     Goal appropriate for indication? Goal INR 2-3, standard for indication(s) above     Time in Therapeutic Range (TTR)  (Goal > 60%) 47.9%       Office visit with referring provider's group within last year yes on 05/26/2023       Kindra Garay, RN  [facility]

## 2023-07-28 ENCOUNTER — MEDICAL CORRESPONDENCE (OUTPATIENT)
Dept: HEALTH INFORMATION MANAGEMENT | Facility: CLINIC | Age: 63
End: 2023-07-28
Payer: COMMERCIAL

## 2023-07-28 ENCOUNTER — TELEPHONE (OUTPATIENT)
Dept: PSYCHIATRY | Facility: CLINIC | Age: 63
End: 2023-07-28
Payer: COMMERCIAL

## 2023-07-28 PROBLEM — Z86.711 HISTORY OF PULMONARY EMBOLISM: Status: ACTIVE | Noted: 2023-07-28

## 2023-07-28 NOTE — TELEPHONE ENCOUNTER
Provider review and sign Facsimile Transmission form and fax.    Hardcopy in provider's fill drawer.     Fax no. [phone]

## 2023-07-31 DIAGNOSIS — E78.2 MIXED HYPERLIPIDEMIA: ICD-10-CM

## 2023-08-01 ENCOUNTER — HOSPITAL ENCOUNTER (OUTPATIENT)
Dept: CARDIAC REHAB | Facility: HOSPITAL | Age: 63
Discharge: HOME | End: 2023-08-01
Attending: INTERNAL MEDICINE
Payer: COMMERCIAL

## 2023-08-01 DIAGNOSIS — E03.9 ACQUIRED HYPOTHYROIDISM: ICD-10-CM

## 2023-08-01 PROCEDURE — G0239 OTH RESP PROC, GROUP: HCPCS

## 2023-08-01 RX ORDER — ROSUVASTATIN CALCIUM 20 MG/1
TABLET, COATED ORAL
Qty: 28 TABLET | OUTPATIENT
Start: 2023-08-01

## 2023-08-01 RX ORDER — LEVOTHYROXINE SODIUM 300 UG/1
300 TABLET ORAL DAILY
Qty: 90 TABLET | Refills: 3 | Status: SHIPPED | OUTPATIENT
Start: 2023-08-01 | End: 2024-06-14

## 2023-08-01 NOTE — TELEPHONE ENCOUNTER
"Last Written Prescription Date: 8/18/222  Last Fill Quantity: 90, # refills: 3  Last office visit provider: 6/19/23        Requested Prescriptions   Pending Prescriptions Disp Refills    levothyroxine (SYNTHROID/LEVOTHROID) 300 MCG tablet 90 tablet 3     Sig: Take 1 tablet (300 mcg) by mouth daily       Thyroid Protocol Passed - 8/1/2023  8:59 AM        Passed - Patient is 12 years or older        Passed - Recent (12 mo) or future (30 days) visit within the authorizing provider's specialty     Patient has had an office visit with the authorizing provider or a provider within the authorizing providers department within the previous 12 mos or has a future within next 30 days. See \"Patient Info\" tab in inbasket, or \"Choose Columns\" in Meds & Orders section of the refill encounter.              Passed - Medication is active on med list        Passed - Normal TSH on file in past 12 months     Recent Labs   Lab Test 02/17/23  1434   TSH 1.19              Passed - No active pregnancy on record     If patient is pregnant or has had a positive pregnancy test, please check TSH.          Passed - No positive pregnancy test in past 12 months     If patient is pregnant or has had a positive pregnancy test, please check TSH.                   Chen Paz RN 08/01/23 3:11 PM   "

## 2023-08-01 NOTE — TELEPHONE ENCOUNTER
Medication Request  Medication name: levothyroxine (SYNTHROID/LEVOTHROID) 300 MCG tablet   Requested Pharmacy:  [facility]  When was patient last seen for this?:  6/22/2023  Patient offered appointment:  N/A pharmacy sent request  Okay to leave a detailed message: no

## 2023-08-01 NOTE — TELEPHONE ENCOUNTER
Form signed stating patient is capable to understand and act on ordinary affairs of life such as providing on adequate food, housing, clothing, etc.      Patient is capable of managing benefits in her own best interest.      Given to MA to fax.

## 2023-08-01 NOTE — TELEPHONE ENCOUNTER
Unable to fax to CM because we do not have signed TRAVIS.  Request for TRAVIS sent to the pt via [employer].  Completed form put in to provider's mail box folder.

## 2023-08-03 ENCOUNTER — TELEPHONE (OUTPATIENT)
Dept: PULMONOLOGY | Facility: CLINIC | Age: 63
End: 2023-08-03
Payer: COMMERCIAL

## 2023-08-03 DIAGNOSIS — E78.2 MIXED HYPERLIPIDEMIA: ICD-10-CM

## 2023-08-03 RX ORDER — ROSUVASTATIN CALCIUM 20 MG/1
20 TABLET, COATED ORAL DAILY
Qty: 28 TABLET | Refills: 12 | Status: SHIPPED | OUTPATIENT
Start: 2023-08-03 | End: 2024-07-05

## 2023-08-03 NOTE — TELEPHONE ENCOUNTER
Spoke with Valentina in regards to message from call center. Advised her to make a follow up appointment with Herlinda Henderson CNP or Dr. aMllory for September. Gave her scheduling phone number to make that appointment.

## 2023-08-03 NOTE — TELEPHONE ENCOUNTER
[facility] Call Center    Phone Message    May a detailed message be left on voicemail: yes     Reason for Call: Other: Valentina would like to know if Dr. Mallory would like to keep seeing her or is she done? Last visit was 9/22. Please contact patient to confirm. Thank you     Action Taken: Other: pulm    Travel Screening: Not Applicable

## 2023-08-03 NOTE — TELEPHONE ENCOUNTER
"Routing refill request to provider for review/approval because:  Early refill request    Last Written Prescription Date:  9/2/2022  Last Fill Quantity: 90,  # refills: 3   Last office visit provider:  6/22/2023     Requested Prescriptions   Pending Prescriptions Disp Refills    rosuvastatin (CRESTOR) 20 MG tablet [Pharmacy Med Name: Rosuvastatin Calcium 20MG TABS] 28 tablet      Sig: TAKE 1 TABLET BY MOUTH DAILY       Statins Protocol Passed - 8/3/2023  9:29 AM        Passed - LDL on file in past 12 months     Recent Labs   Lab Test 02/17/23  1434   LDL 98             Passed - No abnormal creatine kinase in past 12 months     No lab results found.             Passed - Recent (12 mo) or future (30 days) visit within the authorizing provider's specialty     Patient has had an office visit with the authorizing provider or a provider within the authorizing providers department within the previous 12 mos or has a future within next 30 days. See \"Patient Info\" tab in inbasket, or \"Choose Columns\" in Meds & Orders section of the refill encounter.              Passed - Medication is active on med list        Passed - Patient is age 18 or older        Passed - No active pregnancy on record        Passed - No positive pregnancy test in past 12 months             Luz Elena Sarabia RN 08/03/23 2:26 PM  "
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
## 2023-08-07 ENCOUNTER — TELEPHONE (OUTPATIENT)
Dept: BEHAVIORAL HEALTH | Facility: CLINIC | Age: 63
End: 2023-08-07
Payer: COMMERCIAL

## 2023-08-07 NOTE — TELEPHONE ENCOUNTER
First attempt to contact pt. Marianar left a VM with TC contact info and encouraged a phone call back to schedule initial therapy appointment. Marianar will postpone for tomorrow.    Anabella Boyd  08/07/2023  1036

## 2023-08-07 NOTE — TELEPHONE ENCOUNTER
Patient left a voicemail for TC stating calling back to schedule.    Coordinator returned call and left a voicemail requesting call back to TC.    Debora Velasquez  Transition Clinic Coordinator  08/07/23 2:43 PM

## 2023-08-07 NOTE — TELEPHONE ENCOUNTER
----- Message from Lalo Conner sent at 8/3/2023 12:18 PM CDT -----  Transition Clinic Referral   [place]         Please Check Type of Referral Requested:       ____THERAPY: The Transition clinic is able to schedule patients without current medical insurance; these patient will be referred to our Social Work Care Coordinator for Medical Insurance              Assistance. We are open for referral for psychotherapy. Patient is referred from:  [facility]      ____MEDICATION:  Referrals for Medication are ONLY accepted from the following areas (select): NA                                       Suboxone and Opioid Management Referrals are automatically denied. [facility] cannot see patient without active medical insurance.     [facility] cannot accept patient with next level of care scheduled with PCP  The transition clinic cannot follow patients who are on a restricted recipient program.    GUARDIAN: If your patient is not their own Guardian, please provide the following:    Guardian Name:  Guardian Contact Information (Phone & Email) :  Guardian Address:     FOSTER CARE PROVIDER: If your patient lives at a Licensed Foster Care, please provide the following:    Foster Provider:  Foster Provider Contact Information (Phone & Email):  Foster Provider Address:         Referring Provider Contact Name:  Breana Puente MD; Phone Number: unk    Reason for Transition Clinic Referral: F33.1 (ICD-10-CM) - Major depressive disorder, recurrent episode, moderate (H)    Next Level of Care Patient Will Be Transitioned To:   Provider(s)  Location   Date/Time Valentina Olmedo MRN: [id number]   Date: 11/27/2023 Status: Scheduled  Time: 10:00 AM Length: 60  Visit Type: ADULT PSYCHOTHERAPY NEW [[id number]] Copay: $0.00  Provider: [facility] Department: [facility]  Encounter #: [id number]          What Would Be Helpful from the Transition Clinic: bridge gap     Needs: NO    Does  Patient Have Access to Technology: yers    Patient E-mail Address: [email or web address]    Current Patient Phone Number: [phone];     Clinician Gender Preference (if applicable): unk    Patient location preference: would prefer in person if avail     Lalo Conner

## 2023-08-07 NOTE — TELEPHONE ENCOUNTER
Patient called back to TC. Scheduled initial TC Therapy appointment in person 8/16/2023 with Raine. Intake documents sent via [employer].     Debora Velasquez  Transition Clinic Coordinator  08/07/23 4:01 PM

## 2023-08-08 ENCOUNTER — ANTICOAGULATION THERAPY VISIT (OUTPATIENT)
Dept: ANTICOAGULATION | Facility: CLINIC | Age: 63
End: 2023-08-08
Payer: COMMERCIAL

## 2023-08-08 DIAGNOSIS — Z86.711 HISTORY OF PULMONARY EMBOLISM: ICD-10-CM

## 2023-08-08 DIAGNOSIS — I26.99 PULMONARY EMBOLISM WITH INFARCTION (H): Primary | ICD-10-CM

## 2023-08-08 DIAGNOSIS — Z79.01 LONG TERM (CURRENT) USE OF ANTICOAGULANTS: ICD-10-CM

## 2023-08-08 LAB — INR (EXTERNAL): 1.9 (ref 0.9–1.1)

## 2023-08-08 NOTE — PROGRESS NOTES
ANTICOAGULATION MANAGEMENT     Valentina Olmedo 63 year old female is on warfarin with subtherapeutic INR result. (Goal INR 2.0-3.0)    Recent labs: (last 7 days)     08/08/23  0944   INR 1.9*       ASSESSMENT     Source(s): Chart Review and Home Care/Facility Nurse     Warfarin doses taken: Warfarin taken as instructed  Diet: No new diet changes identified  Medication/supplement changes:  Titrated Seroquel. Today is last dose.   New illness, injury, or hospitalization: No  Signs or symptoms of bleeding or clotting: No  Previous result: Therapeutic last 2(+) visits  Additional findings: None       PLAN     Recommended plan for ongoing change(s) affecting INR     Dosing Instructions: Increase your warfarin dose (3% change) with next INR in 2 weeks       Summary  As of 8/8/2023      Full warfarin instructions:  12.5 mg every Sun, Tue, Fri; 10 mg all other days   Next INR check:  8/22/2023               Telephone call with Lea home care nurse who agrees to plan and repeated back plan correctly    Orders given to  Homecare nurse/facility to recheck    Education provided:   Please call back if any changes to your diet, medications or how you've been taking warfarin    Plan made per [facility] anticoagulation protocol    Melissa Montenegro, RN  Anticoagulation Clinic  8/8/2023    _______________________________________________________________________     Anticoagulation Episode Summary       Current INR goal:  2.0-3.0   TTR:  49.1 % (1 y)   Target end date:  Indefinite   Send INR reminders to:  [facility]    Indications    History of pulmonary embolism (Resolved) [Z86.711]  Pulmonary embolism with infarction (H) [I26.99]  Long term (current) use of anticoagulants [Z79.01]  History of pulmonary embolism [Z86.711]             Comments:               Anticoagulation Care Providers       Provider Role Specialty Phone number    Promise Vanegas MD Referring Family Medicine [phone]    Donald Rooney MD Referring  Internal Medicine [phone]    Jose Maria Mroin MD Referring Family Medicine [phone]

## 2023-08-09 ENCOUNTER — OFFICE VISIT (OUTPATIENT)
Dept: FAMILY MEDICINE | Facility: CLINIC | Age: 63
End: 2023-08-09
Payer: COMMERCIAL

## 2023-08-09 VITALS
HEART RATE: 85 BPM | DIASTOLIC BLOOD PRESSURE: 85 MMHG | SYSTOLIC BLOOD PRESSURE: 130 MMHG | TEMPERATURE: 98 F | OXYGEN SATURATION: 96 % | RESPIRATION RATE: 16 BRPM

## 2023-08-09 DIAGNOSIS — H10.13 ALLERGIC CONJUNCTIVITIS, BILATERAL: Primary | ICD-10-CM

## 2023-08-09 PROCEDURE — 99213 OFFICE O/P EST LOW 20 MIN: CPT | Performed by: FAMILY MEDICINE

## 2023-08-09 RX ORDER — AZELASTINE HYDROCHLORIDE 0.5 MG/ML
1 SOLUTION/ DROPS OPHTHALMIC 2 TIMES DAILY
Qty: 10 ML | Refills: 1 | Status: SHIPPED | OUTPATIENT
Start: 2023-08-09 | End: 2024-03-27

## 2023-08-09 RX ORDER — NEOMYCIN SULFATE, POLYMYXIN B SULFATE AND DEXAMETHASONE 3.5; 10000; 1 MG/ML; [USP'U]/ML; MG/ML
SUSPENSION/ DROPS OPHTHALMIC
COMMUNITY
Start: 2023-08-02 | End: 2024-03-27

## 2023-08-09 ASSESSMENT — PAIN SCALES - GENERAL: PAINLEVEL: MILD PAIN (2)

## 2023-08-09 NOTE — PROGRESS NOTES
Assessment & Plan   Patient was using a Polytrim type of medication on her eyes and this helped initially but now she is getting some burning and discharge and a lot of itching from her eyes; will have her discontinue this medication I feel this may be an allergic type of conjunctivitis and will use the following medication for at least 2 weeks; she was advised that it may burn a little bit when she initiates this therapy she will follow-up with the [facility] [place] if no improvement  Allergic conjunctivitis, bilateral  Use the following medication for least 2 weeks  - azelastine (OPTIVAR) 0.05 % ophthalmic solution; Apply 1 drop to eye 2 times daily                 Isma Hensley MD  [facility] KASIA Montgomery is a 63 year old, presenting for the following health issues:  Eye Problem (red itchy eyes / swollen - no burning sensation // runny nose/// //)      8/9/2023     8:21 AM   Additional Questions   Roomed by HALEY WHITNEY     Valentina is a patient of [facility] [place] she has been having difficulty with discharge burning and visual loss in both of her eyes for 2 weeks now she was treated with a Polytrim type of ophthalmic solution which helped initially but now is burning and she is getting some discharge from the eyes; we will have her discontinue that medication.  Based on the itching and burning I feel she is getting little bit of allergic conjunctivitis component to the overall problem so we will start her on a Optivar regimen twice daily for 2 weeks; hopefully this improves her symptoms and she continues to get less blurry vision.  Ophthalmology may need to be consulted after 2 visits her to primary care.  She will follow-up with her primary care physician at [facility] [place].            Review of Systems   Constitutional, HEENT, cardiovascular, pulmonary, gi and gu systems are negative, except as otherwise noted.      Objective    /85 (BP Location: Left arm, Patient  Position: Sitting, Cuff Size: Adult Large)   Pulse 85   Temp 98  F (36.7  C) (Oral)   Resp 16   LMP  (LMP Unknown)   SpO2 96%   There is no height or weight on file to calculate BMI.  Physical Exam   GENERAL: healthy, alert and no distress  NECK: no adenopathy, no asymmetry, masses, or scars and thyroid normal to palpation  RESP: lungs clear to auscultation - no rales, rhonchi or wheezes  CV: regular rate and rhythm, normal S1 S2, no S3 or S4, no murmur, click or rub, no peripheral edema and peripheral pulses strong  ABDOMEN: soft, nontender, no hepatosplenomegaly, no masses and bowel sounds normal  MS: no gross musculoskeletal defects noted, no edema    Examination of her eyes reveal a none mucopurulent clear discharge with some subconjunctivae injection bilaterally no evidence of iritis

## 2023-08-10 ENCOUNTER — HOSPITAL ENCOUNTER (OUTPATIENT)
Dept: CARDIAC REHAB | Facility: HOSPITAL | Age: 63
Discharge: HOME | End: 2023-08-10
Attending: INTERNAL MEDICINE
Payer: COMMERCIAL

## 2023-08-10 ENCOUNTER — TELEPHONE (OUTPATIENT)
Dept: FAMILY MEDICINE | Facility: CLINIC | Age: 63
End: 2023-08-10

## 2023-08-10 PROCEDURE — G0239 OTH RESP PROC, GROUP: HCPCS

## 2023-08-10 NOTE — TELEPHONE ENCOUNTER
Home Health Care    Reason for call:  Verbal Orders    Orders are needed for this patient.  Skilled Nursin/week for remainder of cert period for med mgmt and inr mgmt, 2 prn visits    Pt Provider: Dr. Morin    Phone Number Homecare Nurse can be reached at: [phone], [facility]     Can we leave a detailed message on this number? YES

## 2023-08-10 NOTE — TELEPHONE ENCOUNTER
Left message to call back for: Edgar SALAZAR full  Information to relay to patient: verbal orders- please route to Taisha BOO

## 2023-08-11 NOTE — TELEPHONE ENCOUNTER
Left message to call back for: Edgar sharma unable to leave message  Information to relay to patient: verbal orders needed, transfer call to RN

## 2023-08-14 DIAGNOSIS — Z53.9 DIAGNOSIS NOT YET DEFINED: Primary | ICD-10-CM

## 2023-08-14 PROCEDURE — G0179 MD RECERTIFICATION HHA PT: HCPCS | Performed by: FAMILY MEDICINE

## 2023-08-14 NOTE — TELEPHONE ENCOUNTER
Left message to call back for: Edgar sharma unable to leave message  Information to relay to patient: verbal orders needed, please route to RN

## 2023-08-15 ENCOUNTER — MEDICAL CORRESPONDENCE (OUTPATIENT)
Dept: HEALTH INFORMATION MANAGEMENT | Facility: CLINIC | Age: 63
End: 2023-08-15

## 2023-08-16 ENCOUNTER — TELEPHONE (OUTPATIENT)
Dept: BEHAVIORAL HEALTH | Facility: CLINIC | Age: 63
End: 2023-08-16
Payer: COMMERCIAL

## 2023-08-16 NOTE — TELEPHONE ENCOUNTER
This writer had session with pt scheduled for this day at 1030. Pt did not arrive. Writer called phone number as listed in Epic but there was no voice mail set up, thus writer unable to leave message. Will attempt one more call to reschedule pt's appt.     Raine Interiano, [facility]  8.16.2023  1105

## 2023-08-17 ENCOUNTER — TELEPHONE (OUTPATIENT)
Dept: NEUROSURGERY | Facility: CLINIC | Age: 63
End: 2023-08-17
Payer: COMMERCIAL

## 2023-08-17 NOTE — TELEPHONE ENCOUNTER
Date: 8/17/2023  (Provide the date when patient was called)  Provider: Dr. Jacobsen  (with whom patient needs to schedule with)  Detailed message: attempted to call & kelle 3-4 months follow up in October of 2023, unable to ldvm, vm box was full.

## 2023-08-18 NOTE — TELEPHONE ENCOUNTER
Call to Lea to relay verbal orders. No answer, voicemail box full and unable to leave message.     Call to [facility] office to relay verbal orders which were given to RIDGE Ponce who states she will communicate them with Lea.

## 2023-08-22 ENCOUNTER — OFFICE VISIT (OUTPATIENT)
Dept: FAMILY MEDICINE | Facility: CLINIC | Age: 63
End: 2023-08-22
Payer: COMMERCIAL

## 2023-08-22 ENCOUNTER — ANTICOAGULATION THERAPY VISIT (OUTPATIENT)
Dept: ANTICOAGULATION | Facility: CLINIC | Age: 63
End: 2023-08-22

## 2023-08-22 VITALS
DIASTOLIC BLOOD PRESSURE: 77 MMHG | RESPIRATION RATE: 15 BRPM | TEMPERATURE: 98 F | HEIGHT: 66 IN | WEIGHT: 293 LBS | OXYGEN SATURATION: 97 % | BODY MASS INDEX: 47.09 KG/M2 | SYSTOLIC BLOOD PRESSURE: 128 MMHG | HEART RATE: 75 BPM

## 2023-08-22 DIAGNOSIS — T14.8XXA ABRASION: ICD-10-CM

## 2023-08-22 DIAGNOSIS — Z79.01 LONG TERM (CURRENT) USE OF ANTICOAGULANTS: ICD-10-CM

## 2023-08-22 DIAGNOSIS — Z86.711 HISTORY OF PULMONARY EMBOLISM: ICD-10-CM

## 2023-08-22 DIAGNOSIS — I26.99 PULMONARY EMBOLISM WITH INFARCTION (H): Primary | ICD-10-CM

## 2023-08-22 DIAGNOSIS — J30.2 SEASONAL ALLERGIC RHINITIS, UNSPECIFIED TRIGGER: ICD-10-CM

## 2023-08-22 DIAGNOSIS — H10.13 ALLERGIC CONJUNCTIVITIS, BILATERAL: Primary | ICD-10-CM

## 2023-08-22 LAB — INR (EXTERNAL): 1.6 (ref 0.9–1.1)

## 2023-08-22 PROCEDURE — 99213 OFFICE O/P EST LOW 20 MIN: CPT | Performed by: NURSE PRACTITIONER

## 2023-08-22 RX ORDER — LORATADINE 10 MG/1
10 TABLET ORAL DAILY
Qty: 30 TABLET | Refills: 1 | Status: SHIPPED | OUTPATIENT
Start: 2023-08-22 | End: 2023-10-21

## 2023-08-22 RX ORDER — MUPIROCIN 20 MG/G
OINTMENT TOPICAL 3 TIMES DAILY
Qty: 15 G | Refills: 1 | Status: SHIPPED | OUTPATIENT
Start: 2023-08-22 | End: 2023-09-05

## 2023-08-22 ASSESSMENT — ENCOUNTER SYMPTOMS: WHEEZING: 1

## 2023-08-22 NOTE — PROGRESS NOTES
ANTICOAGULATION MANAGEMENT     Valentina Olmedo 63 year old female is on warfarin with subtherapeutic INR result. (Goal INR 2.0-3.0)    Recent labs: (last 7 days)     08/22/23  1539   INR 1.6*       ASSESSMENT     Source(s): Chart Review and Home Care/Facility Nurse     Warfarin doses taken: Warfarin taken as instructed  Diet: No new diet changes identified  Medication/supplement changes: None noted-patient has finished Seroquel which could explain the lower result today.  New illness, injury, or hospitalization: No  Signs or symptoms of bleeding or clotting: No  Previous result: Subtherapeutic  Additional findings: None       PLAN     Recommended plan for no diet, medication or health factor changes affecting INR     Dosing Instructions: booster dose then Increase your warfarin dose (12.9% change) with next INR in 1 week       Summary  As of 8/22/2023      Full warfarin instructions:  8/22: 15 mg; Otherwise 12.5 mg every day   Next INR check:  8/29/2023               Telephone call with Katelyn home care nurse who verbalizes understanding and agrees to plan    Orders given to  Homecare nurse/facility to recheck    Education provided:   Please call back if any changes to your diet, medications or how you've been taking warfarin    Plan made per [facility] anticoagulation protocol    Emerita Ca RN  Anticoagulation Clinic  8/22/2023    _______________________________________________________________________     Anticoagulation Episode Summary       Current INR goal:  2.0-3.0   TTR:  49.0 % (1 y)   Target end date:  Indefinite   Send INR reminders to:  [facility]    Indications    History of pulmonary embolism (Resolved) [Z86.711]  Pulmonary embolism with infarction (H) [I26.99]  Long term (current) use of anticoagulants [Z79.01]  History of pulmonary embolism [Z86.711]             Comments:               Anticoagulation Care Providers       Provider Role Specialty Phone number    Promise Vanegas MD Referring  Family Medicine [phone]    Donald Rooney MD Referring Internal Medicine [phone]    Jose Maria Morin MD Referring Family Medicine [phone]

## 2023-08-22 NOTE — PROGRESS NOTES
Assessment & Plan     Allergic conjunctivitis, bilateral  I have filled out TRAVIS for [facility] to try to get some more information about patient's visit there yesterday.  Per patient they recommended she see allergy to determine what she was allergic to that was may be causing the eye symptoms.  I have placed a referral for this today  - Adult Allergy/Asthma Referral; Future    Seasonal allergic rhinitis, unspecified trigger  Patient reports she has never taken allergy medications in the past other than eyedrops.  I have sent in Claritin for her to try.  I did discuss with patient and allergy specialist may have different opinion on what medication may be best for her but we could try this for now.  - loratadine (CLARITIN) 10 MG tablet; Take 1 tablet (10 mg) by mouth daily for 60 days    Abrasion  Small abrasion on abdomen.  It does appear minimally infected and it has not been healing over the last several weeks.  I think we should start with topical Bactroban.  It is in an area that may be getting rubbed by clothing.  I recommend patient put large Band-Aid over it to prevent friction.  - mupirocin (BACTROBAN) 2 % external ointment; Apply topically 3 times daily for 14 days                 SHAHRZAD CHANDLER [facility]    Magda Montgomery is a 63 year old, presenting for the following health issues:  Allergies (Swollen eyes; wanting a referral to an allergist)      8/22/2023    10:30 AM   Additional Questions   Roomed by Mei TORRES       History of Present Illness       Reason for visit:  EYE    She eats 2-3 servings of fruits and vegetables daily.She consumes 1 sweetened beverage(s) daily.She exercises with enough effort to increase her heart rate 9 or less minutes per day.  She exercises with enough effort to increase her heart rate 3 or less days per week.   She is taking medications regularly.     Has been on allergy eye drops-pataday, then started optivar on 8/9.  "  Patient saw eye doctor yesterday. Did biopsy from eye-waiting on results from that to see if there is eye infection-saw discharge. Eye doctor instructed patient to be referred to allergist to see what she is allergic to to make sure she isn't allergic to stuff she is taking.    Was on steroid/antibiotics eye drops-made things worse-dried eyes out more.               Review of Systems   Respiratory:  Positive for wheezing.             Objective    /77 (BP Location: Right arm, Patient Position: Sitting, Cuff Size: Adult Large)   Pulse 75   Temp 98  F (36.7  C) (Oral)   Resp 15   Ht 1.689 m (5' 6.5\")   Wt (!) 156.9 kg (345 lb 12.8 oz)   LMP  (LMP Unknown)   SpO2 97%   BMI 54.98 kg/m    Body mass index is 54.98 kg/m .  Physical Exam  Constitutional:       Appearance: Normal appearance.   Eyes:      Comments: Mostly clear discharge from eyes bilaterally.  Conjunctiva erythematous.   Abdominal:       Neurological:      General: No focal deficit present.      Mental Status: She is alert and oriented to person, place, and time.   Psychiatric:         Mood and Affect: Mood normal.         Behavior: Behavior normal.                      Dr muhammad. [facility].         "

## 2023-08-23 ENCOUNTER — MEDICAL CORRESPONDENCE (OUTPATIENT)
Dept: HEALTH INFORMATION MANAGEMENT | Facility: CLINIC | Age: 63
End: 2023-08-23
Payer: COMMERCIAL

## 2023-08-24 ENCOUNTER — MEDICAL CORRESPONDENCE (OUTPATIENT)
Dept: CARDIAC REHAB | Facility: HOSPITAL | Age: 63
End: 2023-08-24
Payer: COMMERCIAL

## 2023-08-28 DIAGNOSIS — M81.8 OSTEOPOROSIS, IDIOPATHIC: ICD-10-CM

## 2023-08-28 RX ORDER — CHOLECALCIFEROL (VITAMIN D3) 50 MCG
2 TABLET ORAL DAILY
Qty: 180 TABLET | Refills: 3 | Status: SHIPPED | OUTPATIENT
Start: 2023-08-28 | End: 2024-07-31

## 2023-08-28 NOTE — TELEPHONE ENCOUNTER
"Last Written Prescription Date: 10/4/22  Last Fill Quantity: 180, # refills: 3  Last office visit provider: 6/22/23        Requested Prescriptions   Pending Prescriptions Disp Refills    vitamin D3 (CHOLECALCIFEROL) 50 mcg (2000 units) tablet [Pharmacy Med Name: Vitamin D 50 MCG(2000 UT) TABS] 56 tablet      Sig: TAKE 2 TABLETS BY MOUTH DAILY       Vitamin Supplements (Adult) Protocol Passed - 8/28/2023  9:40 AM        Passed - High dose Vitamin D not ordered        Passed - Recent (12 mo) or future (30 days) visit within the authorizing provider's specialty     Patient has had an office visit with the authorizing provider or a provider within the authorizing providers department within the previous 12 mos or has a future within next 30 days. See \"Patient Info\" tab in inbasket, or \"Choose Columns\" in Meds & Orders section of the refill encounter.              Passed - Medication is active on med list                 Chen Paz RN 08/28/23 2:39 PM   "

## 2023-08-29 ENCOUNTER — ANTICOAGULATION THERAPY VISIT (OUTPATIENT)
Dept: ANTICOAGULATION | Facility: CLINIC | Age: 63
End: 2023-08-29
Payer: COMMERCIAL

## 2023-08-29 ENCOUNTER — HOSPITAL ENCOUNTER (OUTPATIENT)
Dept: CARDIAC REHAB | Facility: HOSPITAL | Age: 63
Discharge: HOME | End: 2023-08-29
Attending: INTERNAL MEDICINE
Payer: COMMERCIAL

## 2023-08-29 ENCOUNTER — MEDICAL CORRESPONDENCE (OUTPATIENT)
Dept: HEALTH INFORMATION MANAGEMENT | Facility: CLINIC | Age: 63
End: 2023-08-29
Payer: COMMERCIAL

## 2023-08-29 DIAGNOSIS — Z79.01 LONG TERM (CURRENT) USE OF ANTICOAGULANTS: ICD-10-CM

## 2023-08-29 DIAGNOSIS — I26.99 PULMONARY EMBOLISM WITH INFARCTION (H): Primary | ICD-10-CM

## 2023-08-29 DIAGNOSIS — Z86.711 HISTORY OF PULMONARY EMBOLISM: ICD-10-CM

## 2023-08-29 LAB — INR (EXTERNAL): 3.3 (ref 0.9–1.1)

## 2023-08-29 PROCEDURE — G0239 OTH RESP PROC, GROUP: HCPCS

## 2023-08-29 NOTE — PROGRESS NOTES
ANTICOAGULATION MANAGEMENT     Valentina Olmedo 63 year old female is on warfarin with supratherapeutic INR result. (Goal INR 2.0-3.0)    Recent labs: (last 7 days)     08/29/23  1457   INR 3.3*       ASSESSMENT     Source(s): Chart Review and Home Care/Facility Nurse     Warfarin doses taken: Warfarin taken as instructed  Diet: No new diet changes identified  Medication/supplement changes: None noted  New illness, injury, or hospitalization: No  Signs or symptoms of bleeding or clotting: No  Previous result: Subtherapeutic  Additional findings: None       PLAN     Recommended plan for no diet, medication or health factor changes affecting INR     Dosing Instructions: decrease your warfarin dose (6% change) with next INR in 1 week       Summary  As of 8/29/2023      Full warfarin instructions:  10 mg every Tue, Fri; 12.5 mg all other days   Next INR check:  9/5/2023               Telephone call with Katelyn home care nurse who verbalizes understanding and agrees to plan    Orders given to  Homecare nurse/facility to recheck    Education provided:   Goal range and lab monitoring: goal range and significance of current result    Plan made per [facility] anticoagulation protocol    Arden Shipman RN  Anticoagulation Clinic  8/29/2023    _______________________________________________________________________     Anticoagulation Episode Summary       Current INR goal:  2.0-3.0   TTR:  50.2 % (1 y)   Target end date:  Indefinite   Send INR reminders to:  [facility]    Indications    History of pulmonary embolism (Resolved) [Z86.711]  Pulmonary embolism with infarction (H) [I26.99]  Long term (current) use of anticoagulants [Z79.01]  History of pulmonary embolism [Z86.711]             Comments:               Anticoagulation Care Providers       Provider Role Specialty Phone number    Promise Vanegas MD Referring Family Medicine [phone]    Donald Rooney MD Referring Internal Medicine [phone]    [facility]  MD Jose Maria [facility] [phone]

## 2023-08-31 ENCOUNTER — HOSPITAL ENCOUNTER (OUTPATIENT)
Dept: CARDIAC REHAB | Facility: HOSPITAL | Age: 63
Discharge: HOME | End: 2023-08-31
Attending: INTERNAL MEDICINE
Payer: COMMERCIAL

## 2023-08-31 PROCEDURE — G0239 OTH RESP PROC, GROUP: HCPCS

## 2023-09-05 ENCOUNTER — MEDICAL CORRESPONDENCE (OUTPATIENT)
Dept: HEALTH INFORMATION MANAGEMENT | Facility: CLINIC | Age: 63
End: 2023-09-05
Payer: COMMERCIAL

## 2023-09-05 ENCOUNTER — ANTICOAGULATION THERAPY VISIT (OUTPATIENT)
Dept: ANTICOAGULATION | Facility: CLINIC | Age: 63
End: 2023-09-05
Payer: COMMERCIAL

## 2023-09-05 DIAGNOSIS — Z86.711 HISTORY OF PULMONARY EMBOLISM: ICD-10-CM

## 2023-09-05 DIAGNOSIS — Z79.01 LONG TERM (CURRENT) USE OF ANTICOAGULANTS: ICD-10-CM

## 2023-09-05 DIAGNOSIS — I26.99 PULMONARY EMBOLISM WITH INFARCTION (H): Primary | ICD-10-CM

## 2023-09-05 LAB — INR (EXTERNAL): 5.5 (ref 0.9–1.1)

## 2023-09-05 NOTE — PROGRESS NOTES
ANTICOAGULATION MANAGEMENT     Valentina Olmedo 63 year old female is on warfarin with supratherapeutic INR result. (Goal INR 2.0-3.0)    Recent labs: (last 7 days)     09/05/23  1350   INR 5.5*       ASSESSMENT     Source(s): Chart Review and Home Care/Facility Nurse     Warfarin doses taken: Warfarin taken as instructed  Diet: No new diet changes identified  Medication/supplement changes: None noted  New illness, injury, or hospitalization: Yes: Diarrhea one time yesterday  Signs or symptoms of bleeding or clotting: No  Previous result: Supratherapeutic  Additional findings: None  Home care has NOT been given the new dosing instructions, only to hold today 9/5/23       PLAN     Recommended plan for no diet, medication or health factor changes affecting INR     Dosing Instructions: hold dose then decrease your warfarin dose (12.1% change) with next INR in 1 day       Summary  As of 9/5/2023      Full warfarin instructions:  9/5: Hold; Otherwise 12.5 mg every Mon; 10 mg all other days   Next INR check:  9/6/2023               Telephone call with Katelyn home care nurse who verbalizes understanding and agrees to plan and who agrees to plan and repeated back plan correctly    Orders given to  Homecare nurse/facility to recheck    Education provided:   Symptom monitoring: monitoring for bleeding signs and symptoms, when to seek medical attention/emergency care, and if you hit your head or have a bad fall seek emergency care    Plan made per [facility] anticoagulation protocol    Emerita Ca, RN  Anticoagulation Clinic  9/5/2023    _______________________________________________________________________     Anticoagulation Episode Summary       Current INR goal:  2.0-3.0   TTR:  50.2 % (1 y)   Target end date:  Indefinite   Send INR reminders to:  [facility]    Indications    History of pulmonary embolism (Resolved) [Z86.711]  Pulmonary embolism with infarction (H) [I26.99]  Long term (current) use of anticoagulants  [Z79.01]  History of pulmonary embolism [Z86.711]             Comments:               Anticoagulation Care Providers       Provider Role Specialty Phone number    Promise Vanegas MD Referring Family Medicine [phone]    Donald Rooney MD Referring Internal Medicine [phone]    Jose Maria Morin MD Referring Family Medicine [phone]

## 2023-09-06 ENCOUNTER — ANTICOAGULATION THERAPY VISIT (OUTPATIENT)
Dept: ANTICOAGULATION | Facility: CLINIC | Age: 63
End: 2023-09-06
Payer: COMMERCIAL

## 2023-09-06 DIAGNOSIS — Z86.711 HISTORY OF PULMONARY EMBOLISM: ICD-10-CM

## 2023-09-06 DIAGNOSIS — Z79.01 LONG TERM (CURRENT) USE OF ANTICOAGULANTS: ICD-10-CM

## 2023-09-06 DIAGNOSIS — I26.99 PULMONARY EMBOLISM WITH INFARCTION (H): Primary | ICD-10-CM

## 2023-09-06 LAB — INR (EXTERNAL): 3.5 (ref 0.9–1.1)

## 2023-09-06 NOTE — PROGRESS NOTES
ANTICOAGULATION MANAGEMENT     Valentina Olmedo 63 year old female is on warfarin with supratherapeutic INR result. (Goal INR 2.0-3.0)    Recent labs: (last 7 days)     09/06/23  1553   INR 3.5*       ASSESSMENT     Source(s): Chart Review and Home Care/Facility Nurse     Warfarin doses taken: Warfarin taken as instructed  Diet: No new diet changes identified  Medication/supplement changes: None noted  New illness, injury, or hospitalization: Yes: diarrhea X1 on 9/5  Signs or symptoms of bleeding or clotting: No  Previous result: Supratherapeutic  Additional findings: None       PLAN     Recommended plan for no diet, medication or health factor changes affecting INR     Dosing Instructions: Continue your current warfarin dose with next INR in 1 week. 9/5/2023 the maintenance was adjusted by 12.1% decrease.     Summary  As of 9/6/2023      Full warfarin instructions:  12.5 mg every Mon; 10 mg all other days   Next INR check:  9/12/2023               Telephone call with Carla Zepeda home care nurse who agrees to plan and repeated back plan correctly    Orders given to  Homecare nurse/facility to recheck    Education provided:   None required    Plan made per [facility] anticoagulation protocol    Kindra Garay, RN  Anticoagulation Clinic  9/6/2023    _______________________________________________________________________     Anticoagulation Episode Summary       Current INR goal:  2.0-3.0   TTR:  50.2 % (1 y)   Target end date:  Indefinite   Send INR reminders to:  [facility]    Indications    History of pulmonary embolism (Resolved) [Z86.711]  Pulmonary embolism with infarction (H) [I26.99]  Long term (current) use of anticoagulants [Z79.01]  History of pulmonary embolism [Z86.711]             Comments:               Anticoagulation Care Providers       Provider Role Specialty Phone number    Promise Vanegas MD Referring Family Medicine [phone]    Donald Rooney MD Referring Internal Medicine  [phone]    Jose Maria Morin MD [facility] Family Medicine [phone]

## 2023-09-07 ENCOUNTER — OFFICE VISIT (OUTPATIENT)
Dept: ALLERGY | Facility: CLINIC | Age: 63
End: 2023-09-07
Attending: NURSE PRACTITIONER
Payer: COMMERCIAL

## 2023-09-07 VITALS — RESPIRATION RATE: 18 BRPM | HEART RATE: 87 BPM | OXYGEN SATURATION: 95 %

## 2023-09-07 DIAGNOSIS — H10.13 ALLERGIC CONJUNCTIVITIS, BILATERAL: ICD-10-CM

## 2023-09-07 DIAGNOSIS — Z85.850 HISTORY OF THYROID CANCER: ICD-10-CM

## 2023-09-07 DIAGNOSIS — E03.9 HYPOTHYROIDISM, UNSPECIFIED TYPE: ICD-10-CM

## 2023-09-07 LAB
CREAT UR-MCNC: 65.5 MG/DL
HGB BLD-MCNC: 13.6 G/DL (ref 11.7–15.7)
MICROALBUMIN UR-MCNC: <12 MG/L
MICROALBUMIN/CREAT UR: NORMAL MG/G{CREAT}
TSH SERPL DL<=0.005 MIU/L-ACNC: 1.3 UIU/ML (ref 0.3–4.2)

## 2023-09-07 PROCEDURE — 95004 PERQ TESTS W/ALRGNC XTRCS: CPT | Performed by: ALLERGY & IMMUNOLOGY

## 2023-09-07 PROCEDURE — 82043 UR ALBUMIN QUANTITATIVE: CPT | Performed by: ALLERGY & IMMUNOLOGY

## 2023-09-07 PROCEDURE — 85018 HEMOGLOBIN: CPT | Performed by: ALLERGY & IMMUNOLOGY

## 2023-09-07 PROCEDURE — 86003 ALLG SPEC IGE CRUDE XTRC EA: CPT | Performed by: ALLERGY & IMMUNOLOGY

## 2023-09-07 PROCEDURE — 99203 OFFICE O/P NEW LOW 30 MIN: CPT | Mod: 25 | Performed by: ALLERGY & IMMUNOLOGY

## 2023-09-07 PROCEDURE — 84443 ASSAY THYROID STIM HORMONE: CPT | Performed by: ALLERGY & IMMUNOLOGY

## 2023-09-07 PROCEDURE — 82785 ASSAY OF IGE: CPT | Performed by: ALLERGY & IMMUNOLOGY

## 2023-09-07 PROCEDURE — 36415 COLL VENOUS BLD VENIPUNCTURE: CPT | Performed by: ALLERGY & IMMUNOLOGY

## 2023-09-07 PROCEDURE — 82570 ASSAY OF URINE CREATININE: CPT | Performed by: ALLERGY & IMMUNOLOGY

## 2023-09-07 NOTE — PROGRESS NOTES
Subjective   Valentina is a 63 year old, presenting for the following health issues:  Allergy Consult (Itchy watery eyes)    HPI     Chief complaint: Allergies    History of present illness: This is a pleasant 63-year-old woman that I was asked to see for evaluation by Barbi Smith in regards to allergies.  Patient states for many years during the spring, summer and fall, she will note watery eyes and itchy eyes.  She states her eyes itch around them and within the eye.  She has been given a steroid eyedrop by her ophthalmologist as well as Optivar eyedrops which she reports did not seem to help.  She states she does not take any allergy medication regularly.  She has been prescribed a nasal spray but states it was not for allergies.  In review of her medical list it does state that she has been prescribed loratadine and fexofenadine but she states she is not taking this currently.  It also states that she was prescribed Flonase but she states she is not using this.  She denies a history of asthma but she does have an albuterol inhaler that has been prescribed.  She denies any current cough, wheeze or shortness of breath.  She does note some drainage down the throat and some sneezing.  She states occasionally the drainage will cause her to cough.    Past medical history: Epilepsy neuropathy, migraines, obstructive sleep apnea, history of pulmonary embolism, hypothyroidism, hyperlipidemia, hypertension, schizoaffective disorder    Social history: She lives in an apartment, she does have a cat, she thinks there could be some mold in the building, she does have carpeting, central air, non-smoker currently but she is a former smoker    Family history: Positive for allergies in her mother      Review of Systems   Constitutional, HEENT, cardiovascular, pulmonary, GI, , musculoskeletal, neuro, skin, endocrine and psych systems are negative, except as otherwise noted.      Objective    Pulse 87   Resp 18   LMP  (LMP  Unknown)   SpO2 95%   There is no height or weight on file to calculate BMI.  Physical Exam   Gen: Pleasant female not in acute distress  HEENT: Eyes no erythema of the bulbar or palpebral conjunctiva, no edema. Ears: TMs well visualized, no effusions. Nose: No congestion, mucosa normal. Mouth: Throat clear, no lip or tongue edema.   Cardiac: Regular rate and rhythm, no murmurs, rubs or gallops  Respiratory: Clear to auscultation bilaterally, no adventitious breath sounds  Lymph: No visible supraclavicular or cervical lymphadenopathy  Skin: Small amount of redness of the upper and lower eyelid  Psych: Alert and oriented times 3      At today s visit the patient/parent and I engaged in an informed consent discussion about allergy testing.  We discussed skin testing, blood testing,  and the alternative of not undergoing any testing. The patient/ parent has a preference for skin testing. We then discussed the risks and benefits of skin testing.  The patient/ parent understands skin testing risks can include, but are not limited to, urticaria, angioedema, shortness of breath, and severe anaphylaxis.  The benefits include, but are not limited, to evaluation for allergens causing symptoms.  After answering the patients/parents questions they have agreed to proceed with skin testing.    30 percutaneous test were placed to the environmental skin test panel.  Negative histamine control.    Impression report and plan:  1.  Eye itching  2.  Rhinitis    Today, the patient had a negative histamine control.  Recommend specific IgE testing and I will contact patient once testing returns.  For now, no further medications until testing is completed.

## 2023-09-07 NOTE — LETTER
9/7/2023         RE: Valentina Olmedo  [address]  [facility] [address]        Dear Colleague,    Thank you for referring your patient, Valentina Olmedo, to the [facility]. Please see a copy of my visit note below.          Magda Montgomery is a 63 year old, presenting for the following health issues:  Allergy Consult (Itchy watery eyes)    HPI     Chief complaint: Allergies    History of present illness: This is a pleasant 63-year-old woman that I was asked to see for evaluation by Barbi Smith in regards to allergies.  Patient states for many years during the spring, summer and fall, she will note watery eyes and itchy eyes.  She states her eyes itch around them and within the eye.  She has been given a steroid eyedrop by her ophthalmologist as well as Optivar eyedrops which she reports did not seem to help.  She states she does not take any allergy medication regularly.  She has been prescribed a nasal spray but states it was not for allergies.  In review of her medical list it does state that she has been prescribed loratadine and fexofenadine but she states she is not taking this currently.  It also states that she was prescribed Flonase but she states she is not using this.  She denies a history of asthma but she does have an albuterol inhaler that has been prescribed.  She denies any current cough, wheeze or shortness of breath.  She does note some drainage down the throat and some sneezing.  She states occasionally the drainage will cause her to cough.    Past medical history: Epilepsy neuropathy, migraines, obstructive sleep apnea, history of pulmonary embolism, hypothyroidism, hyperlipidemia, hypertension, schizoaffective disorder    Social history: She lives in an apartment, she does have a cat, she thinks there could be some mold in the building, she does have carpeting, central air, non-smoker currently but she is a former smoker    Family history:  Positive for allergies in her mother      Review of Systems   Constitutional, HEENT, cardiovascular, pulmonary, GI, , musculoskeletal, neuro, skin, endocrine and psych systems are negative, except as otherwise noted.      Objective    Pulse 87   Resp 18   LMP  (LMP Unknown)   SpO2 95%   There is no height or weight on file to calculate BMI.  Physical Exam   Gen: Pleasant female not in acute distress  HEENT: Eyes no erythema of the bulbar or palpebral conjunctiva, no edema. Ears: TMs well visualized, no effusions. Nose: No congestion, mucosa normal. Mouth: Throat clear, no lip or tongue edema.   Cardiac: Regular rate and rhythm, no murmurs, rubs or gallops  Respiratory: Clear to auscultation bilaterally, no adventitious breath sounds  Lymph: No visible supraclavicular or cervical lymphadenopathy  Skin: Small amount of redness of the upper and lower eyelid  Psych: Alert and oriented times 3      At today s visit the patient/parent and I engaged in an informed consent discussion about allergy testing.  We discussed skin testing, blood testing,  and the alternative of not undergoing any testing. The patient/ parent has a preference for skin testing. We then discussed the risks and benefits of skin testing.  The patient/ parent understands skin testing risks can include, but are not limited to, urticaria, angioedema, shortness of breath, and severe anaphylaxis.  The benefits include, but are not limited, to evaluation for allergens causing symptoms.  After answering the patients/parents questions they have agreed to proceed with skin testing.    30 percutaneous test were placed to the environmental skin test panel.  Negative histamine control.    Impression report and plan:  1.  Eye itching  2.  Rhinitis    Today, the patient had a negative histamine control.  Recommend specific IgE testing and I will contact patient once testing returns.  For now, no further medications until testing is completed.                     Again, thank you for allowing me to participate in the care of your patient.        Sincerely,        Iman ROMAN MD

## 2023-09-08 ENCOUNTER — MEDICAL CORRESPONDENCE (OUTPATIENT)
Dept: HEALTH INFORMATION MANAGEMENT | Facility: CLINIC | Age: 63
End: 2023-09-08
Payer: COMMERCIAL

## 2023-09-12 ENCOUNTER — TELEPHONE (OUTPATIENT)
Dept: FAMILY MEDICINE | Facility: CLINIC | Age: 63
End: 2023-09-12
Payer: COMMERCIAL

## 2023-09-12 ENCOUNTER — ANTICOAGULATION THERAPY VISIT (OUTPATIENT)
Dept: ANTICOAGULATION | Facility: CLINIC | Age: 63
End: 2023-09-12
Payer: COMMERCIAL

## 2023-09-12 DIAGNOSIS — Z86.711 HISTORY OF PULMONARY EMBOLISM: ICD-10-CM

## 2023-09-12 DIAGNOSIS — Z79.01 LONG TERM (CURRENT) USE OF ANTICOAGULANTS: ICD-10-CM

## 2023-09-12 DIAGNOSIS — I26.99 PULMONARY EMBOLISM WITH INFARCTION (H): Primary | ICD-10-CM

## 2023-09-12 LAB
A ALTERNATA IGE QN: <0.1 KU(A)/L
A FUMIGATUS IGE QN: <0.1 KU(A)/L
BERMUDA GRASS IGE QN: <0.1 KU(A)/L
C HERBARUM IGE QN: <0.1 KU(A)/L
CAT DANDER IGG QN: <0.1 KU(A)/L
CEDAR IGE QN: <0.1 KU(A)/L
COMMON RAGWEED IGE QN: <0.1 KU(A)/L
COTTONWOOD IGE QN: <0.1 KU(A)/L
D FARINAE IGE QN: <0.1 KU(A)/L
D PTERONYSS IGE QN: <0.1 KU(A)/L
DOG DANDER+EPITH IGE QN: <0.1 KU(A)/L
IGE SERPL-ACNC: 19 KU/L (ref 0–114)
INR (EXTERNAL): 3.7 (ref 0.9–1.1)
MAPLE IGE QN: <0.1 KU(A)/L
MARSH ELDER IGE QN: <0.1 KU(A)/L
MOUSE URINE PROT IGE QN: <0.1 KU(A)/L
NETTLE IGE QN: <0.1 KU(A)/L
P NOTATUM IGE QN: <0.1 KU(A)/L
ROACH IGE QN: <0.1 KU(A)/L
SALTWORT IGE QN: <0.1 KU(A)/L
SILVER BIRCH IGE QN: <0.1 KU(A)/L
TIMOTHY IGE QN: <0.1 KU(A)/L
WHITE ASH IGE QN: <0.1 KU(A)/L
WHITE ELM IGE QN: <0.1 KU(A)/L
WHITE MULBERRY IGE QN: <0.1 KU(A)/L
WHITE OAK IGE QN: <0.1 KU(A)/L

## 2023-09-12 NOTE — PROGRESS NOTES
ANTICOAGULATION MANAGEMENT     Valentina Olmedo 63 year old female is on warfarin with supratherapeutic INR result. (Goal INR 2.0-3.0)    Recent labs: (last 7 days)     09/12/23  1419   INR 3.7*       ASSESSMENT     Source(s): Chart Review and Home Care/Facility Nurse     Warfarin doses taken: Warfarin taken as instructed  Diet: No new diet changes identified  Medication/supplement changes:  Taking 3,900-5,200mg Tyelnol/day. She usually doesn't take any.  Tylenol can cause INR to rise when taken at high doses.  New illness, injury, or hospitalization: Fell on Friday. Did not her head. Significant pain L elbow. Xray in ED showed possible minimally displaced fracture.  Signs or symptoms of bleeding or clotting: No  Previous result: Supratherapeutic  Additional findings: None       PLAN     Recommended plan for temporary change(s) and ongoing change(s) affecting INR     Dosing Instructions: partial hold then decrease your warfarin dose (10% change) with next INR in 1 week       Summary  As of 9/12/2023      Full warfarin instructions:  9/12: 2.5 mg; Otherwise 7.5 mg every Mon, Fri; 10 mg all other days   Next INR check:  9/19/2023               Telephone call with Katelyn home care nurse who verbalizes understanding and agrees to plan    Orders given to  Homecare nurse/facility to recheck    Education provided:   Goal range and lab monitoring: goal range and significance of current result    Plan made per [facility] anticoagulation protocol    Arden Shipman RN  Anticoagulation Clinic  9/12/2023    _______________________________________________________________________     Anticoagulation Episode Summary       Current INR goal:  2.0-3.0   TTR:  50.2 % (1 y)   Target end date:  Indefinite   Send INR reminders to:  [facility]    Indications    History of pulmonary embolism (Resolved) [Z86.711]  Pulmonary embolism with infarction (H) [I26.99]  Long term (current) use of anticoagulants [Z79.01]  History of pulmonary embolism  [I45.859]             Comments:               Anticoagulation Care Providers       Provider Role Specialty Phone number    Promise Vanegas MD Referring Family Medicine [phone]    Donald Rooney MD Referring Internal Medicine [phone]    Jose Maria Morin MD Referring Family Medicine [phone]

## 2023-09-12 NOTE — TELEPHONE ENCOUNTER
Please call pharmacy[facility] [facility]. She only needs one of the allergy medications. The allegra is fine

## 2023-09-12 NOTE — TELEPHONE ENCOUNTER
Pharmacist from [facility] calling to get clarification on antihistamines.    Valentina has both Allegra and Claritin ordered.  The pharmacy requesting if patient should be on both of the antihistamines or one.      Routing to Dr Smith to advise if patinet should be on both or just the Allegra, prescribed at office visit on 8/22/23.      Pharmacy requesting a call back to receive clarification.

## 2023-09-15 ENCOUNTER — MEDICAL CORRESPONDENCE (OUTPATIENT)
Dept: HEALTH INFORMATION MANAGEMENT | Facility: CLINIC | Age: 63
End: 2023-09-15
Payer: COMMERCIAL

## 2023-09-19 ENCOUNTER — ANTICOAGULATION THERAPY VISIT (OUTPATIENT)
Dept: ANTICOAGULATION | Facility: CLINIC | Age: 63
End: 2023-09-19
Payer: COMMERCIAL

## 2023-09-19 DIAGNOSIS — I26.99 PULMONARY EMBOLISM WITH INFARCTION (H): Primary | ICD-10-CM

## 2023-09-19 DIAGNOSIS — Z86.711 HISTORY OF PULMONARY EMBOLISM: ICD-10-CM

## 2023-09-19 DIAGNOSIS — Z79.01 LONG TERM (CURRENT) USE OF ANTICOAGULANTS: ICD-10-CM

## 2023-09-19 LAB — INR (EXTERNAL): 3.3 (ref 0.9–1.1)

## 2023-09-19 NOTE — PROGRESS NOTES
ANTICOAGULATION MANAGEMENT     Valentina Olmedo 63 year old female is on warfarin with supratherapeutic INR result. (Goal INR 2.0-3.0)    Recent labs: (last 7 days)     09/19/23  1430   INR 3.3*       ASSESSMENT     Source(s): Chart Review and Home Care/Facility Nurse     Warfarin doses taken: Warfarin taken as instructed  Diet: No new diet changes identified  Medication/supplement changes:  Tylenol 650 mg only taking 1-2 tabs per day as needed which may be increasing INR today  New illness, injury, or hospitalization: No  Signs or symptoms of bleeding or clotting: No  Previous result: Supratherapeutic  Additional findings: None       PLAN     Recommended plan for temporary change(s) affecting INR     Dosing Instructions: partial hold then decrease your warfarin dose (4% change) with next INR in 6 days       Summary  As of 9/19/2023      Full warfarin instructions:  9/19: 7.5 mg; Otherwise 7.5 mg every Mon, Wed, Fri; 10 mg all other days   Next INR check:  9/25/2023               Telephone call with Katelyn home care nurse who verbalizes understanding and agrees to plan    Orders given to  Homecare nurse/facility to recheck    Education provided:   Goal range and lab monitoring: goal range and significance of current result and Importance of following up at instructed interval    Plan made per [facility] anticoagulation protocol    Zari Maria RN  Anticoagulation Clinic  9/19/2023    _______________________________________________________________________     Anticoagulation Episode Summary       Current INR goal:  2.0-3.0   TTR:  50.2 % (1 y)   Target end date:  Indefinite   Send INR reminders to:  [facility]    Indications    History of pulmonary embolism (Resolved) [Z86.711]  Pulmonary embolism with infarction (H) [I26.99]  Long term (current) use of anticoagulants [Z79.01]  History of pulmonary embolism [Z86.711]             Comments:               Anticoagulation Care Providers       Provider Role Specialty Phone  number    Promise Vanegas MD Referring Family Medicine [phone]    Donald Rooney MD Referring Internal Medicine [phone]    Jose Maria Morin MD Referring Family Medicine [phone]

## 2023-09-21 ENCOUNTER — MEDICAL CORRESPONDENCE (OUTPATIENT)
Dept: HEALTH INFORMATION MANAGEMENT | Facility: CLINIC | Age: 63
End: 2023-09-21
Payer: COMMERCIAL

## 2023-09-25 ENCOUNTER — ANTICOAGULATION THERAPY VISIT (OUTPATIENT)
Dept: ANTICOAGULATION | Facility: CLINIC | Age: 63
End: 2023-09-25
Payer: COMMERCIAL

## 2023-09-25 DIAGNOSIS — Z79.01 LONG TERM (CURRENT) USE OF ANTICOAGULANTS: ICD-10-CM

## 2023-09-25 DIAGNOSIS — Z86.711 HISTORY OF PULMONARY EMBOLISM: ICD-10-CM

## 2023-09-25 DIAGNOSIS — I26.99 PULMONARY EMBOLISM WITH INFARCTION (H): Primary | ICD-10-CM

## 2023-09-25 LAB — INR (EXTERNAL): 2.3 (ref 0.9–1.1)

## 2023-09-25 NOTE — PROGRESS NOTES
ANTICOAGULATION MANAGEMENT     Valentina Olmedo 63 year old female is on warfarin with therapeutic INR result. (Goal INR 2.0-3.0)    Recent labs: (last 7 days)     09/25/23  0000   INR 2.3*       ASSESSMENT     Source(s): Chart Review and Home Care/Facility Nurse     Warfarin doses taken: Warfarin taken as instructed  Diet: No new diet changes identified  Medication/supplement changes:  Started Cefadroxil on Friday 9/22/23, last dose of course should be 9/29/23. Pt was prescribed medication per HC nurse because stitches were taken out and one was not completely healed. Also reported that tylenol intake has decreased over past week.   New illness, injury, or hospitalization: No  Signs or symptoms of bleeding or clotting: No  Previous result: Supratherapeutic  Additional findings: None       PLAN     Recommended plan for temporary change(s) affecting INR     Dosing Instructions: Continue your current warfarin dose with next INR in 3-4 days       Summary  As of 9/25/2023      Full warfarin instructions:  7.5 mg every Mon, Wed, Fri; 10 mg all other days   Next INR check:  9/28/2023               Telephone call with Katelyn home care nurse who verbalizes understanding and agrees to plan    Orders given to  Homecare nurse/facility to recheck    Education provided:   Goal range and lab monitoring: goal range and significance of current result and Importance of therapeutic range  Interaction IS anticipated between warfarin and cefadroxil     Plan made per [facility] anticoagulation protocol    Angus Vazquez RN  Anticoagulation Clinic  9/25/2023    _______________________________________________________________________     Anticoagulation Episode Summary       Current INR goal:  2.0-3.0   TTR:  51.3 % (1 y)   Target end date:  Indefinite   Send INR reminders to:  [facility]    Indications    History of pulmonary embolism (Resolved) [Z86.711]  Pulmonary embolism with infarction (H) [I26.99]  Long term (current) use of anticoagulants  [Z79.01]  History of pulmonary embolism [Z86.711]             Comments:               Anticoagulation Care Providers       Provider Role Specialty Phone number    Promise Vanegas MD Referring Family Medicine [phone]    Donald Rooney MD Referring Internal Medicine [phone]    Jose Maria Morin MD Referring Family Medicine [phone]

## 2023-09-26 ENCOUNTER — DOCUMENTATION ONLY (OUTPATIENT)
Dept: SLEEP MEDICINE | Facility: CLINIC | Age: 63
End: 2023-09-26
Payer: COMMERCIAL

## 2023-09-26 ENCOUNTER — MEDICAL CORRESPONDENCE (OUTPATIENT)
Dept: HEALTH INFORMATION MANAGEMENT | Facility: CLINIC | Age: 63
End: 2023-09-26

## 2023-09-26 NOTE — PROGRESS NOTES
STM Recheck:  Patient called looking to find a new Provider she hasn't been using her CPAP.  Gave patient number to Central Scheduling.

## 2023-09-28 ENCOUNTER — HOSPITAL ENCOUNTER (OUTPATIENT)
Dept: CARDIAC REHAB | Facility: HOSPITAL | Age: 63
Discharge: HOME | End: 2023-09-28
Attending: INTERNAL MEDICINE
Payer: COMMERCIAL

## 2023-09-28 PROCEDURE — G0239 OTH RESP PROC, GROUP: HCPCS

## 2023-09-29 ENCOUNTER — ANTICOAGULATION THERAPY VISIT (OUTPATIENT)
Dept: ANTICOAGULATION | Facility: CLINIC | Age: 63
End: 2023-09-29
Payer: COMMERCIAL

## 2023-09-29 DIAGNOSIS — I26.99 PULMONARY EMBOLISM WITH INFARCTION (H): Primary | ICD-10-CM

## 2023-09-29 DIAGNOSIS — Z86.711 HISTORY OF PULMONARY EMBOLISM: ICD-10-CM

## 2023-09-29 DIAGNOSIS — Z79.01 LONG TERM (CURRENT) USE OF ANTICOAGULANTS: ICD-10-CM

## 2023-09-29 LAB — INR (EXTERNAL): 2 (ref 0.9–1.1)

## 2023-09-29 NOTE — PROGRESS NOTES
ANTICOAGULATION MANAGEMENT     Valentina Olmedo 63 year old female is on warfarin with therapeutic INR result. (Goal INR 2.0-3.0)    Recent labs: (last 7 days)     09/29/23  1520   INR 2.0*       ASSESSMENT     Source(s): Chart Review and Home Care/Facility Nurse     Warfarin doses taken: Warfarin taken as instructed  Diet: No new diet changes identified  Medication/supplement changes: None noted  New illness, injury, or hospitalization: No  Signs or symptoms of bleeding or clotting: No  Previous result: Therapeutic last visit; previously outside of goal range  Additional findings: None       PLAN     Recommended plan for no diet, medication or health factor changes affecting INR     Dosing Instructions: Continue your current warfarin dose with next INR in 4 days at next home care visit    Summary  As of 9/29/2023      Full warfarin instructions:  7.5 mg every Mon, Wed, Fri; 10 mg all other days   Next INR check:  10/3/2023               Telephone call with Katelyn home care nurse who verbalizes understanding and agrees to plan    Orders given to  Homecare nurse/facility to recheck    Education provided:   Please call back if any changes to your diet, medications or how you've been taking warfarin    Plan made per [facility] anticoagulation protocol    Emerita Ca, RN  Anticoagulation Clinic  9/29/2023    _______________________________________________________________________     Anticoagulation Episode Summary       Current INR goal:  2.0-3.0   TTR:  52.5 % (1 y)   Target end date:  Indefinite   Send INR reminders to:  [facility]    Indications    History of pulmonary embolism (Resolved) [Z86.711]  Pulmonary embolism with infarction (H) [I26.99]  Long term (current) use of anticoagulants [Z79.01]  History of pulmonary embolism [Z86.711]             Comments:               Anticoagulation Care Providers       Provider Role Specialty Phone number    Promise Vanegas MD Referring Family Medicine [phone]     Donald Rooney MD Referring Internal Medicine [phone]    Jose Maria Morin MD Referring Family Medicine [phone]

## 2023-10-02 ENCOUNTER — MEDICAL CORRESPONDENCE (OUTPATIENT)
Dept: HEALTH INFORMATION MANAGEMENT | Facility: CLINIC | Age: 63
End: 2023-10-02
Payer: COMMERCIAL

## 2023-10-03 ENCOUNTER — ANTICOAGULATION THERAPY VISIT (OUTPATIENT)
Dept: ANTICOAGULATION | Facility: CLINIC | Age: 63
End: 2023-10-03
Payer: COMMERCIAL

## 2023-10-03 ENCOUNTER — MEDICAL CORRESPONDENCE (OUTPATIENT)
Dept: HEALTH INFORMATION MANAGEMENT | Facility: CLINIC | Age: 63
End: 2023-10-03
Payer: COMMERCIAL

## 2023-10-03 DIAGNOSIS — I26.99 PULMONARY EMBOLISM WITH INFARCTION (H): Primary | ICD-10-CM

## 2023-10-03 DIAGNOSIS — Z86.711 HISTORY OF PULMONARY EMBOLISM: ICD-10-CM

## 2023-10-03 DIAGNOSIS — Z79.01 LONG TERM (CURRENT) USE OF ANTICOAGULANTS: ICD-10-CM

## 2023-10-03 LAB — INR (EXTERNAL): 2 (ref 0.9–1.1)

## 2023-10-03 NOTE — PROGRESS NOTES
ANTICOAGULATION MANAGEMENT     Valentina Olmedo 63 year old female is on warfarin with therapeutic INR result. (Goal INR 2.0-3.0)    Recent labs: (last 7 days)     10/03/23  1049   INR 2.0*       ASSESSMENT     Source(s): Chart Review and Home Care/Facility Nurse     Warfarin doses taken: Warfarin taken as instructed  Diet: No new diet changes identified  Medication/supplement changes: None noted  New illness, injury, or hospitalization: No  Signs or symptoms of bleeding or clotting: No  Previous result: Therapeutic last 2(+) visits  Additional findings: None       PLAN     Recommended plan for no diet, medication or health factor changes affecting INR     Dosing Instructions: Continue your current warfarin dose with next INR in 1 week       Summary  As of 10/3/2023      Full warfarin instructions:  7.5 mg every Mon, Wed, Fri; 10 mg all other days   Next INR check:  10/10/2023               Telephone call with Carla Burnham home care nurse who agrees to plan and repeated back plan correctly    Orders given to  Homecare nurse/facility to recheck    Education provided:   None required    Plan made per [facility] anticoagulation protocol    Kindra Garay, RN  Anticoagulation Clinic  10/3/2023    _______________________________________________________________________     Anticoagulation Episode Summary       Current INR goal:  2.0-3.0   TTR:  53.5 % (1 y)   Target end date:  Indefinite   Send INR reminders to:  [facility]    Indications    History of pulmonary embolism (Resolved) [Z86.711]  Pulmonary embolism with infarction (H) [I26.99]  Long term (current) use of anticoagulants [Z79.01]  History of pulmonary embolism [Z86.711]             Comments:               Anticoagulation Care Providers       Provider Role Specialty Phone number    Promise Vanegas MD Referring Family Medicine [phone]    Donald Rooney MD Referring Internal Medicine [phone]    Jose Maria Morin MD Referring Family Medicine  [phone]

## 2023-10-05 ENCOUNTER — HOSPITAL ENCOUNTER (OUTPATIENT)
Dept: CARDIAC REHAB | Facility: HOSPITAL | Age: 63
Discharge: HOME | End: 2023-10-05
Attending: INTERNAL MEDICINE
Payer: COMMERCIAL

## 2023-10-05 ENCOUNTER — MEDICAL CORRESPONDENCE (OUTPATIENT)
Dept: HEALTH INFORMATION MANAGEMENT | Facility: CLINIC | Age: 63
End: 2023-10-05
Payer: COMMERCIAL

## 2023-10-05 PROCEDURE — G0239 OTH RESP PROC, GROUP: HCPCS

## 2023-10-09 DIAGNOSIS — K21.00 GASTROESOPHAGEAL REFLUX DISEASE WITH ESOPHAGITIS WITHOUT HEMORRHAGE: ICD-10-CM

## 2023-10-09 RX ORDER — ESOMEPRAZOLE MAGNESIUM 40 MG/1
CAPSULE, DELAYED RELEASE ORAL
Qty: 30 CAPSULE | Refills: 4 | Status: SHIPPED | OUTPATIENT
Start: 2023-10-09 | End: 2024-02-16

## 2023-10-10 ENCOUNTER — TELEPHONE (OUTPATIENT)
Dept: FAMILY MEDICINE | Facility: CLINIC | Age: 63
End: 2023-10-10
Payer: COMMERCIAL

## 2023-10-10 ENCOUNTER — ANTICOAGULATION THERAPY VISIT (OUTPATIENT)
Dept: ANTICOAGULATION | Facility: CLINIC | Age: 63
End: 2023-10-10
Payer: COMMERCIAL

## 2023-10-10 ENCOUNTER — HOSPITAL ENCOUNTER (OUTPATIENT)
Dept: CARDIAC REHAB | Facility: HOSPITAL | Age: 63
Discharge: HOME | End: 2023-10-10
Attending: INTERNAL MEDICINE
Payer: COMMERCIAL

## 2023-10-10 DIAGNOSIS — Z86.711 HISTORY OF PULMONARY EMBOLISM: ICD-10-CM

## 2023-10-10 DIAGNOSIS — I26.99 PULMONARY EMBOLISM WITH INFARCTION (H): Primary | ICD-10-CM

## 2023-10-10 DIAGNOSIS — K59.09 CHRONIC CONSTIPATION: ICD-10-CM

## 2023-10-10 DIAGNOSIS — Z76.0 ENCOUNTER FOR MEDICATION REFILL: ICD-10-CM

## 2023-10-10 DIAGNOSIS — Z79.01 LONG TERM (CURRENT) USE OF ANTICOAGULANTS: ICD-10-CM

## 2023-10-10 LAB — INR (EXTERNAL): 3.5 (ref 0.9–1.1)

## 2023-10-10 PROCEDURE — G0239 OTH RESP PROC, GROUP: HCPCS

## 2023-10-10 RX ORDER — SENNOSIDES 8.6 MG/1
TABLET, COATED ORAL
Qty: 120 TABLET | Refills: 11 | Status: SHIPPED | OUTPATIENT
Start: 2023-10-10 | End: 2024-08-30

## 2023-10-10 NOTE — PROGRESS NOTES
ANTICOAGULATION MANAGEMENT     Valentina Olmedo 63 year old female is on warfarin with supratherapeutic INR result. (Goal INR 2.0-3.0)    Recent labs: (last 7 days)     10/10/23  1332   INR 3.5*       ASSESSMENT     Source(s): Chart Review and Home Care/Facility Nurse     Warfarin doses taken: Warfarin taken as instructed  Diet: No new diet changes identified  Medication/supplement changes: None noted  New illness, injury, or hospitalization: No  Signs or symptoms of bleeding or clotting: No  Previous result: Therapeutic last 2(+) visits  Additional findings: None       PLAN     Recommended plan for no diet, medication or health factor changes affecting INR     Dosing Instructions: decrease your warfarin dose (8% change) with next INR in 1 week       Summary  As of 10/10/2023      Full warfarin instructions:  10 mg every Sun, Thu; 7.5 mg all other days   Next INR check:  10/17/2023               Telephone call with [facility] home care nurse who agrees to plan and repeated back plan correctly    Orders given to  Homecare nurse/facility to recheck    Education provided:   Please call back if any changes to your diet, medications or how you've been taking warfarin    Plan made per [facility] anticoagulation protocol    Emerita Ca, RN  Anticoagulation Clinic  10/10/2023    _______________________________________________________________________     Anticoagulation Episode Summary       Current INR goal:  2.0-3.0   TTR:  54.8 % (1 y)   Target end date:  Indefinite   Send INR reminders to:  [facility]    Indications    History of pulmonary embolism (Resolved) [Z86.711]  Pulmonary embolism with infarction (H) [I26.99]  Long term (current) use of anticoagulants [Z79.01]  History of pulmonary embolism [Z86.711]             Comments:               Anticoagulation Care Providers       Provider Role Specialty Phone number    Promise Vanegas MD Referring Family Medicine [phone]    Donald Rooney MD Referring Internal  Medicine [phone]    Jose Maria Morin MD Referring Family Medicine [phone]

## 2023-10-10 NOTE — PROGRESS NOTES
Assessment:   Valentina Olmedo is a 63 year old y.o. female with past medical history significant for epilepsy, migraine, polyneuropathy, obstructive sleep apnea, COPD, history of PE, obesity, hyperlipidemia, hypothyroidism, hypertension, osteoporosis, on warfarin, chronic kidney disease stage III, tobacco abuse, schizoaffective disorder, depression, PTSD, history of thyroid cancer, history of melanoma who presents today for follow-up regarding chronic neck pain with radiation into the bilateral upper extremities with associated numbness, tingling, weakness.  Patient had a CT cervical spine at [facility] February 7, 2023 which showed moderate to severe spinal canal stenosis C4-5, C5-6, and C6-7 and moderate to severe spinal canal stenosis, right at C3-4.  There is also multilevel high-grade foraminal stenosis.  At C4-5 there is a 4 mm calcified lesion in the dorsal spinal canal which is new from 2018, possibly representing a densely mineralized meningioma.  The calcified lesion is not visualized on an MRI cervical spine.  The MRI of the cervical spine showed moderate to severe spinal canal stenosis C5-6 and C6-7 and multilevel high-grade foraminal stenosis.  -Patient saw Dr. Jacobsen July 6, 2023 regarding her cervical spine findings.  She recommended weight loss prior to consideration of elective spinal surgery with a goal BMI of less than 40.  - Patient also complains of worsening migraine headaches over the past 2 to 3 months.  She states that she has seen neurology in the past and they recommended following up with her primary care provider.     Plan:     A shared decision making plan was used.  The patient's values and choices were respected.  The following represents what was discussed and decided upon by the physician assistant and the patient.      1.  DIAGNOSTIC TESTS:  -I reviewed the  CT cervical spine from [facility] from February 7, 2023.  - I reviewed the MRI cervical spine with and without  contrast from [place] [facility] from July 2023.  - I reviewed the MRI lumbar spine from [facility] from July 2023.  - We discussed this patient's case at spine rounds in July 2023.  Recommendation is for a repeat CT cervical spine in 1 year to monitor the densely calcified lesion.  The order for the repeat CT is in.    2.  PHYSICAL THERAPY: Patient has had off-and-on physical therapy for many years.  Most recent course of physical therapy outpatient was August 2022.  She also had home care PT and OT earlier this year.  Patient reports that she cannot tolerate doing her home exercises due to her pain.  Hopefully once pain is under better control she would be able to resume her home exercise program.    3.  MEDICATIONS:  -Pregabalin 25 mg 3 times daily was prescribed.  We could titrate dose higher if needed.  - Patient can continue Tylenol as needed.  - Patient takes duloxetine 120 mg daily for mood.  - Patient cannot take NSAIDs since she is on warfarin.  - Patient takes tizanidine 4 mg 3 times daily.  - Patient reports rash with gabapentin.    4.  INTERVENTIONS: I offer the patient a C7-T1 interlaminar epidural steroid injection.  We will need to get medical clearance for the injection because she is on warfarin.  Patient indicated she would like to proceed.    5.  REFERRALS:  - I entered a referral to the chronic pain clinic.  She has widespread pain.  She is interested in finding out if there are any other options for pain management.  - Patient also wants to follow-up with Dr. Jacobsen.  I gave her the phone number to schedule.    6.  FOLLOW-UP: Patient to follow-up with me 2 weeks after her C7-T1 interlaminar epidural steroid injection.  If she has questions or concerns in the meantime, she should not hesitate to call.    Subjective:     Valentina Olmedo is a 63 year old female who presents today for follow-up regarding chronic neck pain.  I last saw this patient June 23, 2023 patient reports that her neck  pain is getting worse.    Patient complains of bilateral neck pain.  Pain is worse on the left than the right.  Pain radiates all the way down both arms into the hands.  She feels numbness and tingling in both hands and weakness throughout both arms.  She rates her pain today as a 3 out of 10.  At its worst it is a 10 out of 10.  At best it is a 2 out of 10.    Patient also complains of increased migraine headaches over the past 2 to 3 months.  She states her typical migraine medications are not helping.    Treatment to date:  - Physical therapy off-and-on for many years.  Most recent outpatient physical therapy was August 2022.  She has had home care PT and OT earlier this year she states she cannot currently do her home exercise program because of her pain.  - Chiropractic treatment was helpful in the past, most recent treatment September 2022  - No spine injections  - No spine surgeries  - Tylenol is not helpful  - Patient takes duloxetine 120 mg daily for mood  - Tizanidine 4 mg 3 times daily  -Gabapentin caused a rash    Review of systems:  Positive for numbness/tingling, weakness, headache, difficulty with hand skills.  Negative for loss of bowel/bladder control, pain much worse at night, difficulty swallowing, trip/stumble/falls, fevers, unintentional weight loss.  Objective:   CONSTITUTIONAL:  Vital signs as above.  No acute distress.  The patient is well nourished and well groomed.  Patient is observed sitting in a wheelchair.  PSYCHIATRIC:  The patient is awake, alert, oriented to person, place and time.  The patient is answering questions appropriately with clear speech.  Normal affect.  HEENT: Normocephalic, atraumatic.  Sclera clear.    SKIN: Exposed skin is clean, dry, intact without rashes.  MUSCULOSKELETAL:  The patient has 4/5 strength finger flexors.  5/5 strength for the bilateral shoulder abductors, elbow flexors/extensors, wrist extensors, finger abductors.    NEUROLOGICAL: Absent biceps,  triceps reflexes.  1+ bilateral brachioradialis reflexes.    Negative Gerardo sign bilaterally.  Sensation light touch intact bilateral upper extremities throughout.     RESULTS:     I reviewed theCT cervical spine from [facility] dated February 7, 2023.  This shows multilevel cervical spondylosis with moderate to severe spinal canal stenosis C4-5, C5-6, C6-7 and moderate to severe spinal canal stenosis right C3-4.  There is also moderate bilateral foraminal stenosis bilaterally C4-5, moderate to severe bilateral foraminal stenosis C5-6, and moderate bilateral foraminal stenosis C6-7.  There is a possible densely mineralized meningioma in the dorsal spinal canal at C4-5 measuring 4 mm contributing to moderate to severe spinal canal stenosis.  This lesion is new from 2018.    I reviewed the MRI cervical spine no contrast from [place] [facility] dated July 5, 2023.  The possible densely mineralized meningioma at C4-5 is not visualized on the MRI.  There is multilevel cervical spondylosis.  At C2-3 there is moderate right foraminal stenosis.  At C3-4 there is mild spinal canal stenosis with moderate left and severe right foraminal stenosis.  At C4-5 there is moderate spinal canal stenosis with moderate right and severe left foraminal stenosis.  At C5-6 there is moderate to severe spinal canal stenosis with severe left and moderate right foraminal stenosis.  At C6-7 there is moderate spinal canal stenosis with severe left and moderate right foraminal stenosis.

## 2023-10-10 NOTE — TELEPHONE ENCOUNTER
Home Care is calling regarding an established patient with [facility].       Requesting orders from: Jose Maria Morin  Provider is following patient: Yes  Is this a 60-day recertification request?  Yes    Orders Requested    Skilled Nursing  Request for recertification   Frequency:  1x/wk for 60days/ wks and 3 PRN visits      Verbal orders given.  Home Care will send orders for provider to sign.  Confirmed ok to leave a detailed message with call back.  Contact information confirmed and updated as needed.    Chen Paz, RN 2

## 2023-10-10 NOTE — TELEPHONE ENCOUNTER
Home Health Care    Reason for call:  Verbal Orders    Orders are needed for this patient.  Skilled Nursin/week every week for 60 days, 3 PRN visits    Pt Provider: Dr. Morin    Phone Number Homecare Nurse can be reached at: [phone], [facility]    Can we leave a detailed message on this number? YES

## 2023-10-12 ENCOUNTER — MEDICAL CORRESPONDENCE (OUTPATIENT)
Dept: HEALTH INFORMATION MANAGEMENT | Facility: CLINIC | Age: 63
End: 2023-10-12

## 2023-10-13 ENCOUNTER — OFFICE VISIT (OUTPATIENT)
Dept: PHYSICAL MEDICINE AND REHAB | Facility: CLINIC | Age: 63
End: 2023-10-13
Payer: COMMERCIAL

## 2023-10-13 ENCOUNTER — MEDICAL CORRESPONDENCE (OUTPATIENT)
Dept: HEALTH INFORMATION MANAGEMENT | Facility: CLINIC | Age: 63
End: 2023-10-13

## 2023-10-13 ENCOUNTER — TELEPHONE (OUTPATIENT)
Dept: PHYSICAL MEDICINE AND REHAB | Facility: CLINIC | Age: 63
End: 2023-10-13

## 2023-10-13 VITALS — HEART RATE: 90 BPM | OXYGEN SATURATION: 97 % | DIASTOLIC BLOOD PRESSURE: 76 MMHG | SYSTOLIC BLOOD PRESSURE: 128 MMHG

## 2023-10-13 DIAGNOSIS — M54.12 CERVICAL RADICULITIS: Primary | ICD-10-CM

## 2023-10-13 DIAGNOSIS — M47.22 OTHER SPONDYLOSIS WITH RADICULOPATHY, CERVICAL REGION: ICD-10-CM

## 2023-10-13 DIAGNOSIS — Z53.9 DIAGNOSIS NOT YET DEFINED: Primary | ICD-10-CM

## 2023-10-13 DIAGNOSIS — M48.02 CERVICAL STENOSIS OF SPINAL CANAL: ICD-10-CM

## 2023-10-13 DIAGNOSIS — G89.4 CHRONIC PAIN SYNDROME: Primary | ICD-10-CM

## 2023-10-13 PROCEDURE — G0179 MD RECERTIFICATION HHA PT: HCPCS | Performed by: FAMILY MEDICINE

## 2023-10-13 PROCEDURE — 99214 OFFICE O/P EST MOD 30 MIN: CPT | Performed by: PHYSICIAN ASSISTANT

## 2023-10-13 RX ORDER — PREGABALIN 25 MG/1
25 CAPSULE ORAL 3 TIMES DAILY
Qty: 90 CAPSULE | Refills: 1 | Status: SHIPPED | OUTPATIENT
Start: 2023-10-13 | End: 2024-02-19

## 2023-10-13 ASSESSMENT — PAIN SCALES - GENERAL: PAINLEVEL: MILD PAIN (3)

## 2023-10-13 NOTE — LETTER
10/13/2023         RE: Valentina Olmedo  [address]  [place] [address]        Dear Colleague,    Thank you for referring your patient, Valentina Olmedo, to the [facility]. Please see a copy of my visit note below.    Assessment:   Valentina Olmedo is a 63 year old y.o. female with past medical history significant for epilepsy, migraine, polyneuropathy, obstructive sleep apnea, COPD, history of PE, obesity, hyperlipidemia, hypothyroidism, hypertension, osteoporosis, on warfarin, chronic kidney disease stage III, tobacco abuse, schizoaffective disorder, depression, PTSD, history of thyroid cancer, history of melanoma who presents today for follow-up regarding chronic neck pain with radiation into the bilateral upper extremities with associated numbness, tingling, weakness.  Patient had a CT cervical spine at [facility] February 7, 2023 which showed moderate to severe spinal canal stenosis C4-5, C5-6, and C6-7 and moderate to severe spinal canal stenosis, right at C3-4.  There is also multilevel high-grade foraminal stenosis.  At C4-5 there is a 4 mm calcified lesion in the dorsal spinal canal which is new from 2018, possibly representing a densely mineralized meningioma.  The calcified lesion is not visualized on an MRI cervical spine.  The MRI of the cervical spine showed moderate to severe spinal canal stenosis C5-6 and C6-7 and multilevel high-grade foraminal stenosis.  -Patient saw Dr. Jacobsen July 6, 2023 regarding her cervical spine findings.  She recommended weight loss prior to consideration of elective spinal surgery with a goal BMI of less than 40.  - Patient also complains of worsening migraine headaches over the past 2 to 3 months.  She states that she has seen neurology in the past and they recommended following up with her primary care provider.     Plan:     A shared decision making plan was used.  The patient's values and choices were respected.  The  following represents what was discussed and decided upon by the physician assistant and the patient.      1.  DIAGNOSTIC TESTS:  -I reviewed the  CT cervical spine from [facility] from February 7, 2023.  - I reviewed the MRI cervical spine with and without contrast from [place] [facility] from July 2023.  - I reviewed the MRI lumbar spine from [place] [facility] from July 2023.  - We discussed this patient's case at spine rounds in July 2023.  Recommendation is for a repeat CT cervical spine in 1 year to monitor the densely calcified lesion.  The order for the repeat CT is in.    2.  PHYSICAL THERAPY: Patient has had off-and-on physical therapy for many years.  Most recent course of physical therapy outpatient was August 2022.  She also had home care PT and OT earlier this year.  Patient reports that she cannot tolerate doing her home exercises due to her pain.  Hopefully once pain is under better control she would be able to resume her home exercise program.    3.  MEDICATIONS:  -Pregabalin 25 mg 3 times daily was prescribed.  We could titrate dose higher if needed.  - Patient can continue Tylenol as needed.  - Patient takes duloxetine 120 mg daily for mood.  - Patient cannot take NSAIDs since she is on warfarin.  - Patient takes tizanidine 4 mg 3 times daily.  - Patient reports rash with gabapentin.    4.  INTERVENTIONS: I offer the patient a C7-T1 interlaminar epidural steroid injection.  We will need to get medical clearance for the injection because she is on warfarin.  Patient indicated she would like to proceed.    5.  REFERRALS:  - I entered a referral to the chronic pain clinic.  She has widespread pain.  She is interested in finding out if there are any other options for pain management.  - Patient also wants to follow-up with Dr. Jacobsen.  I gave her the phone number to schedule.    6.  FOLLOW-UP: Patient to follow-up with me 2 weeks after her C7-T1 interlaminar epidural steroid injection.  If she has  questions or concerns in the meantime, she should not hesitate to call.    Subjective:     Valentina Olmedo is a 63 year old female who presents today for follow-up regarding chronic neck pain.  I last saw this patient June 23, 2023 patient reports that her neck pain is getting worse.    Patient complains of bilateral neck pain.  Pain is worse on the left than the right.  Pain radiates all the way down both arms into the hands.  She feels numbness and tingling in both hands and weakness throughout both arms.  She rates her pain today as a 3 out of 10.  At its worst it is a 10 out of 10.  At best it is a 2 out of 10.    Patient also complains of increased migraine headaches over the past 2 to 3 months.  She states her typical migraine medications are not helping.    Treatment to date:  - Physical therapy off-and-on for many years.  Most recent outpatient physical therapy was August 2022.  She has had home care PT and OT earlier this year she states she cannot currently do her home exercise program because of her pain.  - Chiropractic treatment was helpful in the past, most recent treatment September 2022  - No spine injections  - No spine surgeries  - Tylenol is not helpful  - Patient takes duloxetine 120 mg daily for mood  - Tizanidine 4 mg 3 times daily  -Gabapentin caused a rash    Review of systems:  Positive for numbness/tingling, weakness, headache, difficulty with hand skills.  Negative for loss of bowel/bladder control, pain much worse at night, difficulty swallowing, trip/stumble/falls, fevers, unintentional weight loss.  Objective:   CONSTITUTIONAL:  Vital signs as above.  No acute distress.  The patient is well nourished and well groomed.  Patient is observed sitting in a wheelchair.  PSYCHIATRIC:  The patient is awake, alert, oriented to person, place and time.  The patient is answering questions appropriately with clear speech.  Normal affect.  HEENT: Normocephalic, atraumatic.  Sclera clear.    SKIN:  Exposed skin is clean, dry, intact without rashes.  MUSCULOSKELETAL:  The patient has 4/5 strength finger flexors.  5/5 strength for the bilateral shoulder abductors, elbow flexors/extensors, wrist extensors, finger abductors.    NEUROLOGICAL: Absent biceps, triceps reflexes.  1+ bilateral brachioradialis reflexes.    Negative Gerardo sign bilaterally.  Sensation light touch intact bilateral upper extremities throughout.     RESULTS:     I reviewed theCT cervical spine from [facility] dated February 7, 2023.  This shows multilevel cervical spondylosis with moderate to severe spinal canal stenosis C4-5, C5-6, C6-7 and moderate to severe spinal canal stenosis right C3-4.  There is also moderate bilateral foraminal stenosis bilaterally C4-5, moderate to severe bilateral foraminal stenosis C5-6, and moderate bilateral foraminal stenosis C6-7.  There is a possible densely mineralized meningioma in the dorsal spinal canal at C4-5 measuring 4 mm contributing to moderate to severe spinal canal stenosis.  This lesion is new from 2018.    I reviewed the MRI cervical spine no contrast from [place] [facility] dated July 5, 2023.  The possible densely mineralized meningioma at C4-5 is not visualized on the MRI.  There is multilevel cervical spondylosis.  At C2-3 there is moderate right foraminal stenosis.  At C3-4 there is mild spinal canal stenosis with moderate left and severe right foraminal stenosis.  At C4-5 there is moderate spinal canal stenosis with moderate right and severe left foraminal stenosis.  At C5-6 there is moderate to severe spinal canal stenosis with severe left and moderate right foraminal stenosis.  At C6-7 there is moderate spinal canal stenosis with severe left and moderate right foraminal stenosis.        Again, thank you for allowing me to participate in the care of your patient.        Sincerely,        Lou Swanson PA-C

## 2023-10-13 NOTE — TELEPHONE ENCOUNTER
Patient was seen at [facility] for neck and arm pain. Her provider, Lou Swanson PA-C, has ordered a C7-T1 epidural steroid injection to help with her symptoms.     In order to get this injection, patient would need to hold her Coumadin for 4 days. On the day of her injection, INR tested here would need to be 1.2 or less in order to proceed with injection. We are looking for medical clearance from Dr. Starr for patient to be able to hold Coumadin. If bridging is needed, please notify patient and discuss that with her.    Once we have a response we will call patient to review injection requirements as well as dates to hold the Coumadin after scheduling her.

## 2023-10-13 NOTE — PATIENT INSTRUCTIONS
A C7/T1 epidural steroid injection has been ordered today.      Please note that this injection uses cortisone.  The cortisone may somewhat weaken the immune system.  It is unknown how much the immune system is weakened.  It is unknown if it is weakened to the point that you may be more likely to get the COVID-19 virus, or if you do get the COVID-19 virus, if you would be sicker than you would have been if you had not had the cortisone injection.  If you do not wish to proceed with the injection, please let the nurse/physician know and do NOT schedule the injection.    Please note that since your immune system is weakened from the cortisone, having any vaccine/shot may be less effective if you have this vaccine within 2 weeks from your cortisone injection.  It is advised to wait 2 weeks after your cortisone injection to have any vaccine (or if you have a vaccine first, wait 2 weeks before you have the cortisone injection).    Please schedule this injection at least 1 week  from now to allow time for insurance prior authorization.  On the day of your injection, you cannot be sick or taking antibiotics.  If you become sick and are prescribed, please call the clinic so your injection can be rescheduled for once you have completed your antibiotics.  You will need to bring a  with you for your injection.   If you have any questions or concerns prior to your injection, please do not hesitate to call the nurse navigation line at [phone] or contact Lou Swanson through [employer].    Please call [phone] to schedule a follow up visit with Dr. Jacobsen.     Follow up with your primary doctor regarding your migraine headaches

## 2023-10-14 NOTE — CONFIDENTIAL NOTE
History of PE and DVT.  Most recent pulmonary embolism was 4/2/2017.  Prior to that pulmonary embolism, she was on warfarin.  The notes are somewhat unclear but it looks like she missed a few doses and became subtherapeutic with her INR.  The medication list dated 3/30/2017 his warfarin on her list.  This is also consistent with a clinic visit with Dr. Rooney earlier that month.  I do not have corresponding INR measurements from that era.    Based on these data, I think her PE was at a time when she was subtherapeutic but actively managing her clot ability with warfarin.  I think it is reasonable to undergo the proposed procedure.  However, I think she needs a bridge.

## 2023-10-16 ENCOUNTER — TELEPHONE (OUTPATIENT)
Dept: FAMILY MEDICINE | Facility: CLINIC | Age: 63
End: 2023-10-16
Payer: COMMERCIAL

## 2023-10-16 ENCOUNTER — ANTICOAGULATION THERAPY VISIT (OUTPATIENT)
Dept: ANTICOAGULATION | Facility: CLINIC | Age: 63
End: 2023-10-16
Payer: COMMERCIAL

## 2023-10-16 DIAGNOSIS — I26.99 PULMONARY EMBOLISM WITH INFARCTION (H): Primary | ICD-10-CM

## 2023-10-16 DIAGNOSIS — Z86.711 HISTORY OF PULMONARY EMBOLISM: ICD-10-CM

## 2023-10-16 DIAGNOSIS — Z79.01 LONG TERM (CURRENT) USE OF ANTICOAGULANTS: ICD-10-CM

## 2023-10-16 LAB — INR (EXTERNAL): 3 (ref 0.9–1.1)

## 2023-10-16 NOTE — TELEPHONE ENCOUNTER
Called patient based on previous note from provider to inquire when patient is going to have her spinal injection.  Per patient this has not been scheduled yet.  Requested patient call [facility] once this is scheduled so a procedure plan can be made.  Patient stated understanding and was agreeable with plan.    Per TE on 10/13/23 patient needs to hold her warfarin for 4 days prior to procedure.    Routing to [facility] for review.    CHRIS CespedesN, RN  Anticoagulation Clinic

## 2023-10-16 NOTE — PROGRESS NOTES
ANTICOAGULATION MANAGEMENT     Valentina Olmedo 63 year old female is on warfarin with therapeutic INR result. (Goal INR 2.0-3.0)    Recent labs: (last 7 days)     10/16/23  1550   INR 3.0*       ASSESSMENT     Source(s): Chart Review and Home Care/Facility Nurse     Warfarin doses taken: Warfarin taken as instructed  Diet: No new diet changes identified  Medication/supplement changes: None noted  New illness, injury, or hospitalization: No  Signs or symptoms of bleeding or clotting: No  Previous result: Supratherapeutic  Additional findings: Upcoming surgery/procedure spinal injection-has not been scheduled yet, patient/home care nurse will call once scheduled.  A separate TE has been sent to [facility].       PLAN     Recommended plan for no diet, medication or health factor changes affecting INR     Dosing Instructions: Continue your current warfarin dose with next INR in 1 week       Summary  As of 10/16/2023      Full warfarin instructions:  10 mg every Sun, Thu; 7.5 mg all other days   Next INR check:  10/24/2023               Telephone call with Katelyn home care nurse who verbalizes understanding and agrees to plan    Orders given to  Homecare nurse/facility to recheck    Education provided:   Please call back if any changes to your diet, medications or how you've been taking warfarin    Plan made per [facility] anticoagulation protocol    Emerita Ca, RN  Anticoagulation Clinic  10/16/2023    _______________________________________________________________________     Anticoagulation Episode Summary       Current INR goal:  2.0-3.0   TTR:  54.8% (1 y)   Target end date:  Indefinite   Send INR reminders to:  [facility]    Indications    History of pulmonary embolism (Resolved) [Z86.711]  Pulmonary embolism with infarction (H) [I26.99]  Long term (current) use of anticoagulants [Z79.01]  History of pulmonary embolism [Z86.711]             Comments:               Anticoagulation Care Providers       Provider Role  Specialty Phone number    Promise Vanegas MD Referring Family Medicine [phone]    Donald Rooney MD Referring Internal Medicine [phone]    Jose Maria Morin MD Referring Family Medicine [phone]

## 2023-10-17 ENCOUNTER — OFFICE VISIT (OUTPATIENT)
Dept: PSYCHIATRY | Facility: CLINIC | Age: 63
End: 2023-10-17
Payer: COMMERCIAL

## 2023-10-17 ENCOUNTER — OFFICE VISIT (OUTPATIENT)
Dept: PULMONOLOGY | Facility: CLINIC | Age: 63
End: 2023-10-17
Attending: INTERNAL MEDICINE
Payer: COMMERCIAL

## 2023-10-17 ENCOUNTER — TELEPHONE (OUTPATIENT)
Dept: NEUROSURGERY | Facility: CLINIC | Age: 63
End: 2023-10-17

## 2023-10-17 VITALS — OXYGEN SATURATION: 97 % | SYSTOLIC BLOOD PRESSURE: 130 MMHG | DIASTOLIC BLOOD PRESSURE: 80 MMHG | HEART RATE: 78 BPM

## 2023-10-17 VITALS — SYSTOLIC BLOOD PRESSURE: 113 MMHG | DIASTOLIC BLOOD PRESSURE: 73 MMHG | HEART RATE: 68 BPM

## 2023-10-17 DIAGNOSIS — M81.8 OSTEOPOROSIS, IDIOPATHIC: ICD-10-CM

## 2023-10-17 DIAGNOSIS — R53.81 PHYSICAL DECONDITIONING: ICD-10-CM

## 2023-10-17 DIAGNOSIS — M80.00XA AGE-RELATED OSTEOPOROSIS WITH CURRENT PATHOLOGICAL FRACTURE, INITIAL ENCOUNTER: Primary | ICD-10-CM

## 2023-10-17 DIAGNOSIS — R06.09 EXERTIONAL DYSPNEA: ICD-10-CM

## 2023-10-17 DIAGNOSIS — N18.32 STAGE 3B CHRONIC KIDNEY DISEASE (H): ICD-10-CM

## 2023-10-17 DIAGNOSIS — E66.01 MORBID OBESITY (H): ICD-10-CM

## 2023-10-17 DIAGNOSIS — M80.8AXS OTHER OSTEOPOROSIS WITH CURRENT PATHOLOGICAL FRACTURE, OTHER SITE, SEQUELA: ICD-10-CM

## 2023-10-17 DIAGNOSIS — F43.10 PTSD (POST-TRAUMATIC STRESS DISORDER): Primary | ICD-10-CM

## 2023-10-17 DIAGNOSIS — M80.00XD AGE-RELATED OSTEOPOROSIS WITH CURRENT PATHOLOGICAL FRACTURE WITH ROUTINE HEALING, SUBSEQUENT ENCOUNTER: ICD-10-CM

## 2023-10-17 DIAGNOSIS — G47.30 SLEEP APNEA, UNSPECIFIED TYPE: Primary | ICD-10-CM

## 2023-10-17 DIAGNOSIS — M48.02 CERVICAL STENOSIS OF SPINAL CANAL: Primary | ICD-10-CM

## 2023-10-17 DIAGNOSIS — M80.80XA: Primary | ICD-10-CM

## 2023-10-17 PROCEDURE — 99214 OFFICE O/P EST MOD 30 MIN: CPT | Performed by: INTERNAL MEDICINE

## 2023-10-17 PROCEDURE — 99215 OFFICE O/P EST HI 40 MIN: CPT | Performed by: PSYCHIATRY & NEUROLOGY

## 2023-10-17 RX ORDER — PRAZOSIN HYDROCHLORIDE 2 MG/1
2 CAPSULE ORAL AT BEDTIME
Qty: 90 CAPSULE | Refills: 1 | Status: SHIPPED | OUTPATIENT
Start: 2023-10-17 | End: 2024-03-21

## 2023-10-17 RX ORDER — TIZANIDINE HYDROCHLORIDE 4 MG/1
4 CAPSULE, GELATIN COATED ORAL 3 TIMES DAILY
COMMUNITY
End: 2024-03-27

## 2023-10-17 RX ORDER — QUETIAPINE FUMARATE 25 MG/1
25 TABLET, FILM COATED ORAL 2 TIMES DAILY
Qty: 180 TABLET | Refills: 1 | Status: SHIPPED | OUTPATIENT
Start: 2023-10-17 | End: 2023-12-12

## 2023-10-17 ASSESSMENT — PAIN SCALES - GENERAL: PAINLEVEL: MODERATE PAIN (5)

## 2023-10-17 NOTE — PROGRESS NOTES
"  Mental Health and Collaborative Care Psychiatry Service Rooming Note      Most pressing mental health concern at this time: This is a time of the year I normally start feeling down because of the Holidays. Melatonin doesn't work -still having difficulties falling asleep      Any new physical health conditions or diagnoses affecting you that we should be aware of: \" Chest pain, my acid reflex \" pain level 5/10, started since she got up this morning. Pt is SOB off and on      Side effects related to medications patient would like to discuss with the provider:  Yes - sleeps until noon or 1 pm because she can't wake up      Are you taking your medications as prescribed?  yes  If not, why? NA      Do you need refills of any of the medications?  No as per chart review  If so, which ones? NA      Are you taking any recreational substances? Denies \" I do not believe in alcohol or drugs\"      Is there any chance you are pregnant? No  Do you use birth control? Hysterectomy      Provider notified  N/A      Add attendance guidelines .phrase here   Care team has reviewed attendance agreement with patient. Patient advised that two failed appointments within 6 months may lead to termination of current episode of care.         Lorraine Villegas LPN  October 17, 2023  8:33 AM      "

## 2023-10-17 NOTE — PROGRESS NOTES
PULMONARY OUTPATIENT FOLLOW UP NOTE      Assessment:      Dyspnea  Secondary to significant deconditioning, body habitus.   Underlying severe sleep apnea.   Significant tobacco use in the past, 1/2 ppd for 45 years, quit February 2023.   PFT showed normal spirometry, lung volumes and diffusion capacity.   Continue albuterol HFA as needed.   Patient has started pulmonary rehab with improvement in exercise capacity.   Allergic rhinitis   Nasal steroid  GERD  Avoid eating close to bedtime. Raise the head of the bed  Continue nexium daily and increase to twice a day if persistent acid reflux symptoms.   Sleep apnea   PSG ( 2/3/2021) AHI 91, SpO2 sheri 78.6%, 9.2 minutes <= 88%.   AutoCPAP.   History of pulmonary embolism   On long term anticoagulation   Physical deconditioning   Continue pulmonary rehabilitation   Previous tobacco use  Significant tobacco use in the past, 1/2 ppd for 45 years, quit February 2023.   Smoking cessation counseling  Morbid obesity    Plan:      Continue albuterol HFA as needed  Flonase one spray each nostril twice a day   Resume CPAP therapy  Avoid eating close to bedtime  Raise the head of the bed  Eat in the upright position   Change omeprazole to nexium twice a day   Referral to GI regarding ongoing GERD  Referral to pulmonary rehabilitation   Smoking cessation counseling  Chantix to quit smoking   Follow up 6 months      Sen Balderas MD  Pulmonary Medicine  10/17/23       CC:     Chief Complaint   Patient presents with    Follow Up     COPD       HPI:      Valentina Olmedo is a 62 year old female who presents for evaluation dyspnea.  Patient has history of HTN, hypothyroidism, GERD, schizoaffective disorder, anxiety disorder, PTSD, history of pulmonary embolism anticoagulated, CHRIS, obesity, previous tobacco user.   Limited mobility, uses walker in short distance, here in a wheelchair.      Patient reports doing well since last visit.   Started to participate in pulmonary  rehabilitation and her activity level, although limited, has slowly improved.   Patient quit tobacco use on February 2023.   Denies chest pain, orthopnea or PND, no swelling of LEs.   Denies fever, chills or night sweats.   Reports postnasal drip, nasal congestion, denies headaches, or sinus tenderness.   Acid reflux symptoms daily , takes nexium   Patient is on long term anticoagulation for history of pulmonary embolism.  Sometimes difficult to use CPAP at night.     Past Medical History :     Past Medical History:   Diagnosis Date    Adrenal mass (H24) 10/12/2015    Chen Null MD  They were incidentally discovered on the CAT scan of the abdomen from December 2011 which was done for evaluation of kidney stones. F/up CT of the abdomen done on 6/26/12 showed a stable 5 mm nodular opacity in the right lower lung lobe, adjacent to the diagram. Bilateral adrenal masses average density measured less than 0 Hounsfield units, consistent with benign etio    Anxiety     Anxiety     Arthritis     Borderline personality disorder (H)     Cancer (H)     COPD (chronic obstructive pulmonary disease) (H)     Depression     Depressive disorder     Hyperlipidemia     Hypertension     Hypertension     Hyponatremia     Hypothyroidism     Malignant neoplasm of thyroid gland (H)     Chen Null MD  She had R hemithyroidectomy for a right neck tumor in 1994. According to the patient, the tumor was benign. She is not sure whether or not the tumor belonged to the thyroid gland. The surgery was done here at the [facility]. In January 2011, she was diagnosed with kidney stones. She was found to have an elevated calcium level of 11.7, with a PTH level of 368. Stone an    Primary hyperparathyroidism (H24)           PTSD (post-traumatic stress disorder)     Pulmonary embolism with infarction (H) 1/12/2021    Recurrent otitis media     Seizure disorder (H)     Stroke (H)     Vertigo           Medications:     Current  Outpatient Medications   Medication    albuterol (PROAIR HFA) 108 (90 Base) MCG/ACT inhaler    azelastine (OPTIVAR) 0.05 % ophthalmic solution    Brivaracetam (BRIVIACT) 100 MG tablet    Calcium Citrate-Vitamin D (CALCIUM + D PO)    carBAMazepine (TEGRETOL) 200 MG tablet    Cyanocobalamin (VITAMIN B12) 1000 MCG TBCR    DULoxetine (CYMBALTA) 60 MG capsule    EPINEPHrine (ADRENACLICK JR) 0.15 MG/0.15ML injection 2-pack    esomeprazole (NEXIUM) 40 MG DR capsule    fexofenadine (ALLEGRA) 180 MG tablet    fluticasone (FLONASE) 50 MCG/ACT nasal spray    levothyroxine (SYNTHROID/LEVOTHROID) 300 MCG tablet    losartan (COZAAR) 25 MG tablet    meclizine (ANTIVERT) 25 MG tablet    melatonin 3 MG tablet    multivitamin with iron (CHROMAGEN) TABS half-tab    nicotine (NICOTROL) 10 MG inhaler    Nutritional Supplements (NUTRITIONAL SHAKE HIGH PROTEIN) LIQD    olopatadine (PATADAY) 0.2 % ophthalmic solution    oxybutynin ER (DITROPAN XL) 10 MG 24 hr tablet    prazosin (MINIPRESS) 1 MG capsule    pregabalin (LYRICA) 25 MG capsule    rosuvastatin (CRESTOR) 20 MG tablet    salmeterol (SEREVENT) 50 MCG/ACT inhaler    senna (SENNA-TIME) 8.6 MG tablet    SUMAtriptan (IMITREX) 50 MG tablet    topiramate (TOPAMAX) 50 MG tablet    varenicline (CHANTIX) 1 MG tablet    Vibegron (GEMTESA) 75 MG TABS tablet    vitamin D3 (CHOLECALCIFEROL) 50 mcg (2000 units) tablet    warfarin ANTICOAGULANT (COUMADIN) 5 MG tablet    EPINEPHrine (ANY BX GENERIC EQUIV) 0.3 MG/0.3ML injection 2-pack    loratadine (CLARITIN) 10 MG tablet    neomycin-polymyxin-dexAMETHasone (MAXITROL) 3.5-81839-7.1 SUSP ophthalmic susp    Polyethylene Glycol POWD    QUEtiapine (SEROQUEL) 50 MG tablet     No current facility-administered medications for this visit.       Social History :     Social History     Socioeconomic History    Marital status:      Spouse name: Not on file    Number of children: Not on file    Years of education: Not on file    Highest education  level: Not on file   Occupational History    Not on file   Tobacco Use    Smoking status: Former    Smokeless tobacco: Never    Tobacco comments:     Quit about 5 months ago on Chantix.     Vaping Use    Vaping Use: Never used   Substance and Sexual Activity    Alcohol use: No     Comment: None.    Drug use: No    Sexual activity: Not Currently   Other Topics Concern    Not on file   Social History Narrative    Not on file     Social Determinants of Health     Financial Resource Strain: Not on file   Food Insecurity: Not on file   Transportation Needs: Not on file   Physical Activity: Not on file   Stress: Not on file   Social Connections: Not on file   Interpersonal Safety: Not on file   Housing Stability: Not on file          Family History :     Family History   Problem Relation Age of Onset    Chronic Obstructive Pulmonary Disease Mother     Other - See Comments Father         estranged    Skin Cancer Father     Lung Cancer Maternal Grandfather 76    Other - See Comments Brother         Missing since 1992    Alcoholism Brother     Diabetes Sister     Depression Sister     Alcoholism Sister     No Known Problems Sister         Half sister       Review of Systems  A 12 point comprehensive review of systems was negative except as noted.        Objective:     /80 (BP Location: Left arm, Patient Position: Chair, Cuff Size: Adult Regular)   Pulse 78   LMP  (LMP Unknown)   SpO2 97%     Physical Exam  Constitutional:       General: She is not in acute distress.     Appearance: She is not ill-appearing or diaphoretic.      Comments: In wheelchair   HENT:      Nose: Nose normal.   Cardiovascular:      Rate and Rhythm: Normal rate and regular rhythm.      Pulses: Normal pulses.      Heart sounds: Normal heart sounds.   Pulmonary:      Effort: Pulmonary effort is normal. No respiratory distress.      Breath sounds: Normal breath sounds. No wheezing or rhonchi.   Skin:     General: Skin is warm and dry.       Findings: No rash.   Neurological:      Mental Status: She is alert.   Psychiatric:         Behavior: Behavior normal.            Diagnostic tests:      Echocardiogram 4/2017  1. Normal left ventricular size and systolic performance with a visually estimated ejection fraction of 60-65%.   2. No significant valvular heart disease is identified on this study.   3. Probable normal right ventricular size and systolic performance though right-sided structures are not clearly visualized on all views on this study.   4. There is mild left atrial enlargement.   5. Right ventricular systolic pressure cannot be directly estimated from TR velocities due to insufficient tricuspid regurgitation. However, there are no indirect findings such as abnormal RV volume/geometry, altered pulmonary flow velocity profile, or leftward septal displacement to suggest moderate or severe pulmonary hypertension.      When compared to the prior real-time echocardiogram dated 29 July 2014, there has been little appreciable interval change.     Nuc Med Stress Test 06/2021:  SUMMARY:  1.  Subjectively positive regadenoson infusion.  2.  Negative regadenoson ECG for ischemia.  3.  Regadenoson nuclear imaging does not suggest any ischemia or prior scar.  4.  Preserved wall motion as mentioned above.  5.  No prior scan is available for comparison.    PFTs:     FVC  2.78 L (82%)  FEV1 2.25 L (85%)  FEV1/FVC 81  TLC 4.87 L (92%)  RV 1.71 L (84%)  DLCO 94%         Sleep study:      Sleep study done on 3/26/2015 showed decrease sleep time, AHI 1.8, per sleep physician, underestimated secondary to no stage REM sleep, significant hypoxia was note present.     PSG ( 2/3/2021) AHI 91, SpO2 sheri 78.6%, 9.2 minutes <= 88%.   AutoCPAP.     IMAGES:     CT CHEST PULMONARY EMBOLISM W CONTRAST  LOCATION: [facility]  DATE/TIME: 8/31/2021 2:17 PM   INDICATION: Dyspnea, chronic, unclear etiology.  COMPARISON: None.  FINDINGS:  ANGIOGRAM CHEST:  Pulmonary arteries are normal caliber and negative for pulmonary emboli. Thoracic aorta is negative for dissection or aneurysm.  LUNGS AND PLEURA: Minimal atelectasis or scarring. Otherwise, lungs are clear. No significant airway thickening or bronchiectasis. No pleural effusion or pneumothorax.  MEDIASTINUM/AXILLAE: No adenopathy. No pericardial effusion. Small hiatus hernia.  CORONARY ARTERY CALCIFICATION: None.  UPPER ABDOMEN: Hepatic steatosis. Stable 2.5 cm left adrenal adenoma requiring no routine follow-up.  MUSCULOSKELETAL: Normal.  IMPRESSION:  1.  No findings to explain symptoms.  2.  No pulmonary embolism.  3.  Hepatic steatosis.  4.  Small hiatus hernia.     XR CHEST 2 VW  LOCATION: [facility]  DATE/TIME: 12/9/2021 10:34 AM  INDICATION: Chronic cough and left chest wall pain. History of COPD.  COMPARISON: CT chest 8/31/2021 and one view chest 9/9/2019  IMPRESSION: Heart size and vascularity are normal. Focal subpleural airspace opacities within the left mid lung and lower lobe suggesting pneumonia. No pneumothorax nor pleural effusion.  Follow-up radiograph should be suggested to ensure resolution.    CT ABDOMEN PELVIS W CONTRAST  DATE/TIME: 5/7/2022 1:16 PM  INDICATION:  Pelvic and perineal pain.  COMPARISON: CT abdomen 05/23/2013.  FINDINGS:   LOWER CHEST: Normal.  HEPATOBILIARY: No acute abnormality. A hypodense nodule suggesting hemangioma is stable in size measuring 1.8 cm posterior right hepatic dome series 3 image 27. Gallbladder appears unremarkable.  PANCREAS: Normal.  SPLEEN: No acute abnormality. Adjacent splenule is again noted.  ADRENAL GLANDS: Hypodense nodule is 2.8 cm right adrenal, previously 1.8 cm series 3 image 55. Normal left adrenal.  KIDNEYS/BLADDER: No hydronephrosis or urolithiasis. No acute abnormality. Bladder is unremarkable.  BOWEL: No obstruction. Normal appendix. No acute inflammatory change.  LYMPH NODES: Normal.  VASCULATURE: Unremarkable.  PELVIC  ORGANS: Hysterectomy. No perineal abscess. No convincing acute abnormality.   MUSCULOSKELETAL: Height loss at L1, L2, and L5 vertebral bodies noted.  IMPRESSION:   1.  No acute abnormality is seen. No perineal or pelvic fluid collection identified.  2.  Larger size of a right adrenal nodule compared to 05/23/2013. This is technically indeterminate. This could be further correlated with adrenal specific CT or MRI.  3.  Height loss noted at L1, L2, and L5 vertebral bodies.

## 2023-10-17 NOTE — Clinical Note
"FYI,  3. Borderline personality disorder Difficulty managing strong emotions. Previous therapy with Pattie Barney [facility]. Unknown if pt actually completed DBT.  Note excerpt: \"Patient is set up with outpatient psychotherapy at [facility] and is also enrolled in a DBT program through [facility].    Jelena Benjamin, [facility], 2/16/2016. Plan:    Therapy appt with Promise Lou on 11/27/23.   Info given on DBT. Pt could benefit from this modality.    "

## 2023-10-17 NOTE — PATIENT INSTRUCTIONS
For video visit on Nov 27:  1.  Open [employer]  2.  Click on big green button.    3. Ask Dr. Benitez to increase Tegretol to 400 mg 3 times daily.  Tell her your psychiatrist notes increased mood instability and agitation.  Also, you get more depressed during winter months. Higher dose could help with this.

## 2023-10-17 NOTE — PROGRESS NOTES
"      Medical Decision Makin. Schizoaffective disorder, depressive type (H)  Reports increased mood lability, agitation.  Yells at . VH-saw body and animal last week. No AH.  Seroquel 100 mg nightly stopped in July due to weight gain.  Was on Tegretol 400 mg 3 times daily.  Dose was decreased by neurology over a year ago.  It is possible Tegretol was helping as mood stabilizer.    Plan:   Risks and benefits of meds discussed.  Pt to contact neurologist and request dose increase of Tegretol to 400 mg tid.  At next visit, will consider adding Abilify if no improvement.  Meds:  -Schedule Seroquel 25 mg twice daily. Ok if pt wants to change to prn once she feels more stable.  -Cymbalta 120 mg daily.  -melatonin 3 mg nightly.      2. Seasonal depression   This time a year is particularly difficult for pt. \"We used to get together with about 20 family members for the holidays, now no one is left except me and René\".  Plan:  Encouraged to participate in Yarsani activities to provide social support.  Pt to request increased missionary visits from her Yarsani ([employer] [facility] [employer]      3. Borderline personality disorder  Difficulty managing strong emotions.  Previous therapy with Pattie Barney [facility].  Unknown if pt actually completed DBT.   Note excerpt: \"Patient is set up with outpatient psychotherapy at [facility] and is also enrolled in a DBT program through [facility].     Jelena Benjamin, [facility], 2016.  Plan:   Therapy appt with Promise Lou on 23.  Info given on DBT. Pt could benefit from this modality.       4. High risk medication use  Risks and benefits of medication discussed (ie metabolic s/e's, TD, CV risk).  Metabolic labs done 2023.  Metabolic conditions treated by PCP.  No involuntary movements noted on MSE.  DISCUS score = 0 (23)  Plan: Topamax 50 mg nightly to target appetite and weight.      5. PTSD   Prazosin resumed in July. " " Still has occasional nightmare.    Plan: Increase prazosin 2 mg q HS.  Warned about low BP.      6. Epilepsy  Managed by Vikki Benitez MD, [facility].  Plan:   Pt to request dose increase of Tegretol to 400 mg tid to help with mood stabilization (she used to be on this dose).    Next appt 2-3 mos.         History of Present Illness:   Valentina Olmedo is a 63 .o. female here for SCAD, bipolar type, PTSD, Neurocognitive disorder, seizure disorder here for f/u.    Pt reports occ VH, increased mood lability.    In-home services:  -PCA  -Med set up q Monday by nurse.  -Food from [facility] meals, [facility]    Weekly INR.      SOC: Lives with  René. Cat, \"Yosi\".       ROS:  Denies SI/manic/sx.  See HPI, otherwise negative.      Current Outpatient Medications:     Brivaracetam (BRIVIACT) 100 MG tablet, Take 150 mg by mouth 2 times daily , Disp: , Rfl:     Calcium Citrate-Vitamin D (CALCIUM + D PO), Take 1 tablet by mouth daily , Disp: , Rfl:     carBAMazepine (TEGRETOL) 200 MG tablet, 200mg in the  at lunch and 400mg at HS, Disp: , Rfl:     denosumab (PROLIA) 60 MG/ML SOSY injection, Inject 60 mg Subcutaneous once Per pt report, Disp: , Rfl:     DULoxetine (CYMBALTA) 60 MG capsule, Take 2 capsules (120 mg) by mouth daily, Disp: 180 capsule, Rfl: 3    EPINEPHrine (ANY BX GENERIC EQUIV) 0.3 MG/0.3ML injection 2-pack, Inject 0.3 mLs (0.3 mg) into the muscle as needed for anaphylaxis, Disp: 2 each, Rfl: 1    esomeprazole (NEXIUM) 40 MG DR capsule, TAKE 1 CAPSULE BY MOUTH EVERY MORNING BEFORE BREAKFAST (TAKE 30-60 MINUTES BEFORE EATING), Disp: 30 capsule, Rfl: 4    fexofenadine (ALLEGRA) 180 MG tablet, Take 1 tablet (180 mg) by mouth daily, Disp: 90 tablet, Rfl: 3    fluticasone (FLONASE) 50 MCG/ACT nasal spray, INHALE 1 SPRAY IN EACH NOSTRIL DAILY Strength: 50 MCG/ACT, Disp: 16 g, Rfl: 11    levothyroxine (SYNTHROID/LEVOTHROID) 300 MCG tablet, Take 1 tablet (300 mcg) by mouth daily, Disp: 90 tablet, Rfl: " 3    losartan (COZAAR) 25 MG tablet, Take 0.5 tablets (12.5 mg) by mouth daily, Disp: 90 tablet, Rfl: 1    meclizine (ANTIVERT) 25 MG tablet, TAKE 1/2-1 TABLET BY MOUTH BY MOUTH THREE TIMES A DAY AS NEEDED FOR DIZZINESS, Disp: 30 tablet, Rfl: 11    melatonin 3 MG tablet, Take 1 tablet (3 mg) by mouth nightly as needed for sleep, Disp: 90 tablet, Rfl: 1    nicotine (NICOTROL) 10 MG inhaler, Use 1 cartridge as needed for urge to smoke by puffing over course of 20min.  Use 6-16 cart/day; reduce number of cart/day over 6-12 weeks., Disp: 168 each, Rfl: 1    olopatadine (PATADAY) 0.2 % ophthalmic solution, Place 1 drop into both eyes daily, Disp: 2.5 mL, Rfl: 5    oxybutynin ER (DITROPAN XL) 10 MG 24 hr tablet, Take 1 tablet (10 mg) by mouth daily, Disp: 90 tablet, Rfl: 3    prazosin (MINIPRESS) 1 MG capsule, Take 1 capsule (1 mg) by mouth At Bedtime, Disp: 90 capsule, Rfl: 1    prazosin (MINIPRESS) 2 MG capsule, Take 1 capsule (2 mg) by mouth at bedtime, Disp: 90 capsule, Rfl: 1    pregabalin (LYRICA) 25 MG capsule, Take 1 capsule (25 mg) by mouth 3 times daily, Disp: 90 capsule, Rfl: 1    QUEtiapine (SEROQUEL) 25 MG tablet, Take 1 tablet (25 mg) by mouth 2 times daily For agitation. OK to change to as needed., Disp: 180 tablet, Rfl: 1    rosuvastatin (CRESTOR) 20 MG tablet, TAKE 1 TABLET BY MOUTH DAILY, Disp: 28 tablet, Rfl: 12    salmeterol (SEREVENT) 50 MCG/ACT inhaler, Inhale 1 puff into the lungs 2 times daily, Disp: 180 each, Rfl: 3    senna (SENNA-TIME) 8.6 MG tablet, TAKE 2 TABLETS (17.2MG) BY MOUTH TWICE A DAY, Disp: 120 tablet, Rfl: 11    SUMAtriptan (IMITREX) 50 MG tablet, Take 1 tablet (50 mg) by mouth at onset of headache for migraine May repeat in 2 hours. Max 4 tablets/24 hours., Disp: 9 tablet, Rfl: 1    tiZANidine (ZANAFLEX) 4 MG capsule, Take 4 mg by mouth 3 times daily Per pt report, Disp: , Rfl:     topiramate (TOPAMAX) 50 MG tablet, Take 1 tablet (50 mg) by mouth At Bedtime, Disp: 90 tablet, Rfl:  3    varenicline (CHANTIX) 1 MG tablet, TAKE 1 TABLET BY MOUTH TWICE A DAY, Disp: 56 tablet, Rfl: 2    Vibegron (GEMTESA) 75 MG TABS tablet, Take 75 mg by mouth daily, Disp: , Rfl:     vitamin D3 (CHOLECALCIFEROL) 50 mcg (2000 units) tablet, TAKE 2 TABLETS BY MOUTH DAILY, Disp: 180 tablet, Rfl: 3    warfarin ANTICOAGULANT (COUMADIN) 5 MG tablet, TAKE 2 TABLETS BY MOUTH ON MON AND FRI ; TAKE 2 & 1/2 TABLETS BY MOUTH ALL OTHER DAYS AS DIRECTED BY INR CLINIC, Disp: 180 tablet, Rfl: 3    albuterol (PROAIR HFA) 108 (90 Base) MCG/ACT inhaler, Inhale 2 puffs into the lungs every 4 hours as needed for shortness of breath / dyspnea or wheezing, Disp: 8 g, Rfl: 4    azelastine (OPTIVAR) 0.05 % ophthalmic solution, Apply 1 drop to eye 2 times daily, Disp: 10 mL, Rfl: 1    Cyanocobalamin (VITAMIN B12) 1000 MCG TBCR, Take 1 tablet by mouth, Disp: , Rfl:     EPINEPHrine (ADRENACLICK JR) 0.15 MG/0.15ML injection 2-pack, Inject 0.15 mLs (0.15 mg) into the muscle as needed for anaphylaxis, Disp: 2 each, Rfl: 1    loratadine (CLARITIN) 10 MG tablet, Take 1 tablet (10 mg) by mouth daily for 60 days, Disp: 30 tablet, Rfl: 1    multivitamin with iron (CHROMAGEN) TABS half-tab, Take 1 tablet by mouth daily, Disp: , Rfl:     neomycin-polymyxin-dexAMETHasone (MAXITROL) 3.5-41353-9.1 SUSP ophthalmic susp, , Disp: , Rfl:     Nutritional Supplements (NUTRITIONAL SHAKE HIGH PROTEIN) LIQD, Take 1 each by mouth every morning Dispense Premier protein shakes. Replace breakfast with 1 shake daily. (Patient not taking: Reported on 10/17/2023), Disp: 9900 mL, Rfl: 11    Polyethylene Glycol POWD, See Admin Instructions Use daily as needed, Disp: , Rfl:     QUEtiapine (SEROQUEL) 50 MG tablet, TAKE 2 TAB Q HS X 1 WEEK, THEN 1 TAB Q HS X 1 WEEK, THEN DC (Patient not taking: Reported on 10/17/2023), Disp: 30 tablet, Rfl: 0    Current Facility-Administered Medications:     denosumab (PROLIA) injection 60 mg, 60 mg, Subcutaneous, Q6 Months, Alice Rolle,  MD       /73 (BP Location: Left arm, Patient Position: Sitting, Cuff Size: Adult Large)   Pulse 68   LMP  (LMP Unknown)      MSE:      Alert & oriented x 3.   Appearance: Neat.  Speech: Normal rate, rhythm and tone.  Gait: Not observed, in w/c  Musculoskeletal: No abnormal movements. No fasciculations.  Mood/Affect: Full range  Thought Process: Normal rate, perseverative  Thought Content: No suicide or homicide ideation.  Associations: Intact, no delusions.  Perceptions: VH-saw a body and animal last week. No AH.  Memory: recent and remote memory intact, not formally tested  Attention span and concentration: normal, not formally tested  Language: Intact.  Fund of Knowledge: Normal.  Insight and Judgement: Fair         Total time spent on this encounter, on the date of service, including pre-visit review of separately obtained history, face-to-face interaction performing medically appropriate physical exam, patient counseling/education, interpretation of diagnostic results, care coordination and documentation was 54 minutes.        Breana Puente MD

## 2023-10-17 NOTE — PATIENT INSTRUCTIONS
Continue albuterol HFA as needed  Flonase one spray each nostril twice a day   Resume CPAP therapy  Avoid eating close to bedtime  Raise the head of the bed  Eat in the upright position   Continue nexium daily and increase to twice a day if persistent acid reflux symptoms  Continue pulmonary rehabilitation   Continue chantix,   Follow up 6 months

## 2023-10-17 NOTE — PROGRESS NOTES
Mental Health and Collaborative Care Psychiatry Service Rooming Note      Most pressing mental health concern at this time: ***      Any new physical health conditions or diagnoses affecting you that we should be aware of: ***      Side effects related to medications patient would like to discuss with the provider:  {YES/NO/NA:562033}      Are you taking your medications as prescribed?  ***  If not, why? ***      Do you need refills of any of the medications?  ***  If so, which ones? ***      Are you taking any recreational substances? ***      Is there any chance you are pregnant? ***  Do you use birth control? ***      Provider notified  {YES/NO/NA:138818}      Add attendance guidelines .phrase here   ***          Lorraine Villegas LPN  October 17, 2023  10:46 AM

## 2023-10-18 ENCOUNTER — OFFICE VISIT (OUTPATIENT)
Dept: NEUROSURGERY | Facility: CLINIC | Age: 63
End: 2023-10-18
Payer: COMMERCIAL

## 2023-10-18 ENCOUNTER — HOSPITAL ENCOUNTER (OUTPATIENT)
Dept: RADIOLOGY | Facility: HOSPITAL | Age: 63
Discharge: HOME | End: 2023-10-18
Attending: SURGERY | Admitting: SURGERY
Payer: COMMERCIAL

## 2023-10-18 VITALS — DIASTOLIC BLOOD PRESSURE: 75 MMHG | OXYGEN SATURATION: 95 % | HEART RATE: 75 BPM | SYSTOLIC BLOOD PRESSURE: 143 MMHG

## 2023-10-18 DIAGNOSIS — M48.02 SPINAL STENOSIS IN CERVICAL REGION: Primary | ICD-10-CM

## 2023-10-18 DIAGNOSIS — M48.02 CERVICAL STENOSIS OF SPINAL CANAL: ICD-10-CM

## 2023-10-18 PROCEDURE — 99213 OFFICE O/P EST LOW 20 MIN: CPT | Performed by: SURGERY

## 2023-10-18 PROCEDURE — 72050 X-RAY EXAM NECK SPINE 4/5VWS: CPT

## 2023-10-18 ASSESSMENT — PAIN SCALES - GENERAL: PAINLEVEL: MODERATE PAIN (5)

## 2023-10-18 NOTE — NURSING NOTE
"Valentina Olmedo is a 63 year old female who presents for:  Chief Complaint   Patient presents with    Follow Up     Cervical spine  Neck pain  -pain shoots down both arms to fingers  Low back pain        Initial Vitals:  BP (!) 143/75   Pulse 75   LMP  (LMP Unknown)   SpO2 95%  Estimated body mass index is 54.98 kg/m  as calculated from the following:    Height as of 8/22/23: 5' 6.5\" (1.689 m).    Weight as of 8/22/23: 345 lb 12.8 oz (156.9 kg).. There is no height or weight on file to calculate BSA. BP completed using cuff size: regular  Moderate Pain (5)    Nursing Comments: Valentina to follow up with Primary Care provider regarding elevated blood pressure.      Mitch Beal  "

## 2023-10-18 NOTE — PATIENT INSTRUCTIONS
Proceed with C7-T1 interlaminar epidural steroid injection.   Follow up with your chronic pain specialist.

## 2023-10-18 NOTE — PROGRESS NOTES
NEUROSURGERY FOLLOW UP  NOTE    Valentina Olmedo comes today in follow-up.    Ongoing neck pain into shoulder and into fingers diffusely.   Pain is always there. Pain is sharp and shooting. Numbness and tingling in her hands. Always the same. Doesn't notice any worsening with flexion of the wrist or at nights. Diffuse weakness. Her elbow is slightly broken which contributes to this. Ambulates around her home. When she ambulates longer distances she uses a wheel chair in case of falls. Migraines and dizziness also contribute to cloudiness.     PHYSICAL EXAM:   Constitutional: BP (!) 143/75   Pulse 75   LMP  (LMP Unknown)   SpO2 95%      Mental Status: A & O in no acute distress.  Affect is appropriate.  Speech is fluent.  Recent and remote memory are intact.  Attention span and concentration are normal.     Motor:  Normal bulk and tone all muscle groups of upper and lower extremities.  Slight weakness of left  otherwise 5/5    Sensory: Sensation intact bilaterally to light touch except numbness in bilateral hands diffusely to LT     Reflexes; supinator, biceps, triceps, knee/ ankle jerk intact 1-2+/4.  no tamayo's or clonus.    IMAGING:   I personally reviewed all radiographic images        CONSULTATION ASSESSMENT AND PLAN:    Patient's last MRI we again reviewed patient's most recent MRI which shows no spinal cord signal change in the setting of multilevel central stenosis.  Patient has moderate to severe spinal canal stenosis from cervical 4 to cervical 7 with multilevel foraminal narrowing.  Patient's BMI continues to be 54.98.  Her largest complaint currently is neck pain.  She would like something to help her neck pain. It is reasonable to undergo the cervical 7-thoracic 1 interlaminar epidural which was recommended and monitor for relief of her neck and arm symptoms.  Discussed could consider surgical decompression at some point but with her BMI being 54.98 she is at significant risk of complications at  this time and would strongly encourage her to lose weight prior to improve outcomes of surgery given she has not had any recent progressive neurological loss. She would like to try additional conservative management as she hopes to avoid surgery.     Gracie Jacosben MD      CC:     Jose Maria Morin  [address] [facility] [place] [address]

## 2023-10-18 NOTE — LETTER
10/18/2023         RE: Valentina Olmedo  [address]  [place] [address]        Dear Colleague,    Thank you for referring your patient, Valentina Olmedo, to the [facility]. Please see a copy of my visit note below.    NEUROSURGERY FOLLOW UP  NOTE    Valentina Olmedo comes today in follow-up.    Ongoing neck pain into shoulder and into fingers diffusely.   Pain is always there. Pain is sharp and shooting. Numbness and tingling in her hands. Always the same. Doesn't notice any worsening with flexion of the wrist or at nights. Diffuse weakness. Her elbow is slightly broken which contributes to this. Ambulates around her home. When she ambulates longer distances she uses a wheel chair in case of falls. Migraines and dizziness also contribute to cloudiness.     PHYSICAL EXAM:   Constitutional: BP (!) 143/75   Pulse 75   LMP  (LMP Unknown)   SpO2 95%      Mental Status: A & O in no acute distress.  Affect is appropriate.  Speech is fluent.  Recent and remote memory are intact.  Attention span and concentration are normal.     Motor:  Normal bulk and tone all muscle groups of upper and lower extremities.  Slight weakness of left  otherwise 5/5    Sensory: Sensation intact bilaterally to light touch except numbness in bilateral hands diffusely to LT     Reflexes; supinator, biceps, triceps, knee/ ankle jerk intact 1-2+/4.  no tamayo's or clonus.    IMAGING:   I personally reviewed all radiographic images        CONSULTATION ASSESSMENT AND PLAN:    Patient's last MRI we again reviewed patient's most recent MRI which shows no spinal cord signal change in the setting of multilevel central stenosis.  Patient has moderate to severe spinal canal stenosis from cervical 4 to cervical 7 with multilevel foraminal narrowing.  Patient's BMI continues to be 54.98.  Her largest complaint currently is neck pain.  She would like something to help her neck pain. It is reasonable to  undergo the cervical 7-thoracic 1 interlaminar epidural which was recommended and monitor for relief of her neck and arm symptoms.  Discussed could consider surgical decompression at some point but with her BMI being 54.98 she is at significant risk of complications at this time and would strongly encourage her to lose weight prior to improve outcomes of surgery given she has not had any recent progressive neurological loss. She would like to try additional conservative management as she hopes to avoid surgery.     Gracie Jacobsen MD      CC:     [facility] [address]      Again, thank you for allowing me to participate in the care of your patient.        Sincerely,        Gracie Jacobsen MD

## 2023-10-19 ENCOUNTER — MEDICAL CORRESPONDENCE (OUTPATIENT)
Dept: HEALTH INFORMATION MANAGEMENT | Facility: CLINIC | Age: 63
End: 2023-10-19
Payer: COMMERCIAL

## 2023-10-20 ENCOUNTER — ANCILLARY PROCEDURE (OUTPATIENT)
Dept: BONE DENSITY | Facility: CLINIC | Age: 63
End: 2023-10-20
Attending: INTERNAL MEDICINE
Payer: COMMERCIAL

## 2023-10-20 DIAGNOSIS — M81.8 OSTEOPOROSIS, IDIOPATHIC: ICD-10-CM

## 2023-10-20 DIAGNOSIS — E21.3 HYPERPARATHYROIDISM (H): ICD-10-CM

## 2023-10-20 PROCEDURE — 77081 DXA BONE DENSITY APPENDICULR: CPT | Mod: TC | Performed by: PHYSICIAN ASSISTANT

## 2023-10-20 PROCEDURE — 77080 DXA BONE DENSITY AXIAL: CPT | Mod: TC | Performed by: PHYSICIAN ASSISTANT

## 2023-10-20 NOTE — TELEPHONE ENCOUNTER
Pt was scheduled for injection on 11/3/23. Please contact patient with instructions for her Warfarin. Thank you!

## 2023-10-20 NOTE — TELEPHONE ENCOUNTER
TE sent to PCP with proposed plan within 10/16/2023 TE description Anticoagulation Procedure Plan now that date set.    Paula Beasley, PharmD BCACP  Anticoagulation Clinical Pharmacist

## 2023-10-20 NOTE — TELEPHONE ENCOUNTER
"SEEMA-PROCEDURAL ANTICOAGULATION  MANAGEMENT    ASSESSMENT     Warfarin interruption plan for spinal injection on 11/03/2023.    Indication for Anticoagulation: PE    PE 04/2017  Per 2014 bariatric notes, vague history of blood clotting during pregnancy leading to fetal demise, with recommendation to take ASA with subsequent pregnancies   No hematology testing on file     Seema-Procedure Risk stratification for thromboembolism: moderate (2022 Chest guidelines)    VTE: 2022 CHEST Perioperative Management guidelines suggest against bridging for patients with Hx of VTE as sole clinical indication for warfarin except in high risk stratification patients.    RECOMMENDATION     Pre-Procedure:  Hold warfarin for 5 days, until after procedure starting: 10/29/2023   No Bridge    Post-Procedure:  Resume warfarin dose if okay with provider doing procedure on night of procedure, 11/03/2023 PM: 20mg  Recheck INR ~ 7 days after resuming warfarin     Plan routed to referring provider for approval  ?   Paula Beasley [facility]    SUBJECTIVE/OBJECTIVE     Valentina ORNELAS Honorio, a 63 year old female    Goal INR Range: 2.0-3.0     Patient bridged in past: Yes: last 01/2020 while inpatient       Wt Readings from Last 3 Encounters:   08/22/23 (!) 156.9 kg (345 lb 12.8 oz)   06/23/23 (!) 158.3 kg (349 lb)   06/22/23 (!) 158.6 kg (349 lb 11.2 oz)      Ideal body weight: 60.4 kg (133 lb 4 oz)  Adjusted ideal body weight: 99 kg (218 lb 4.3 oz)     Estimated body mass index is 54.98 kg/m  as calculated from the following:    Height as of 8/22/23: 1.689 m (5' 6.5\").    Weight as of 8/22/23: 156.9 kg (345 lb 12.8 oz).    Lab Results   Component Value Date    INR 3.0 (A) 10/16/2023    INR 3.5 (A) 10/10/2023    INR 2.0 (A) 10/03/2023     Lab Results   Component Value Date    HGB 13.6 09/07/2023    HCT 41.6 08/18/2022     08/18/2022     Lab Results   Component Value Date    CR 0.75 02/17/2023    CR 0.78 08/18/2022    CR 0.90 07/18/2022     Estimated " Creatinine Clearance: 121.6 mL/min (based on SCr of 0.75 mg/dL).

## 2023-10-23 NOTE — TELEPHONE ENCOUNTER
Spoke with patient and went over the procedure plan. Will send the plan via [email or web address].        Kyleigh Jimenes RN

## 2023-10-24 ENCOUNTER — HOSPITAL ENCOUNTER (OUTPATIENT)
Dept: CARDIAC REHAB | Facility: HOSPITAL | Age: 63
Discharge: HOME | End: 2023-10-24
Attending: INTERNAL MEDICINE
Payer: COMMERCIAL

## 2023-10-24 ENCOUNTER — ANTICOAGULATION THERAPY VISIT (OUTPATIENT)
Dept: ANTICOAGULATION | Facility: CLINIC | Age: 63
End: 2023-10-24
Payer: COMMERCIAL

## 2023-10-24 DIAGNOSIS — Z79.01 LONG TERM (CURRENT) USE OF ANTICOAGULANTS: ICD-10-CM

## 2023-10-24 DIAGNOSIS — Z86.711 HISTORY OF PULMONARY EMBOLISM: ICD-10-CM

## 2023-10-24 DIAGNOSIS — I26.99 PULMONARY EMBOLISM WITH INFARCTION (H): Primary | ICD-10-CM

## 2023-10-24 LAB — INR (EXTERNAL): 2.8 (ref 0.9–1.1)

## 2023-10-24 PROCEDURE — G0239 OTH RESP PROC, GROUP: HCPCS

## 2023-10-24 NOTE — PROGRESS NOTES
ANTICOAGULATION MANAGEMENT     Valentina Olmedo 63 year old female is on warfarin with therapeutic INR result. (Goal INR 2.0-3.0)    Recent labs: (last 7 days)     10/24/23  1444   INR 2.8*       ASSESSMENT     Source(s): Chart Review and Patient/Caregiver Call     Warfarin doses taken: Warfarin taken as instructed  Diet: No new diet changes identified  Medication/supplement changes: prazosin was started, this should not effect INR  New illness, injury, or hospitalization: No  Signs or symptoms of bleeding or clotting: No  Previous result: Supratherapeutic  Additional findings: spinal injection on 11/3/23. See procedure plan from 10/16/23       PLAN     Recommended plan for no diet, medication or health factor changes affecting INR     Dosing Instructions:  Continue current maintenance dose, then hold warfarin 10/29/23-11/2/23, then take a booster dose of 20 mg on 11/3/23, then resume maintenance dose  with next INR in 2 weeks       Summary  As of 10/24/2023      Full warfarin instructions:  10/29: Hold; 10/30: Hold; 10/31: Hold; 11/1: Hold; 11/2: Hold; 11/3: 20 mg; Otherwise 10 mg every Sun, Thu; 7.5 mg all other days   Next INR check:  11/10/2023               Telephone call with Katelyn home care nurse who agrees to plan and repeated back plan correctly    Orders given to  Homecare nurse/facility to recheck    Education provided:   Please call back if any changes to your diet, medications or how you've been taking warfarin  Symptom monitoring: monitoring for bleeding signs and symptoms, monitoring for clotting signs and symptoms, monitoring for stroke signs and symptoms, and when to seek medical attention/emergency care  Contact [phone]  with any changes, questions or concerns.     Plan made per [facility] anticoagulation protocol    Zari Cheng, RN  Anticoagulation Clinic  10/24/2023    _______________________________________________________________________     Anticoagulation Episode Summary       Current INR goal:   2.0-3.0   TTR:  57.0% (1 y)   Target end date:  Indefinite   Send INR reminders to:  [facility]    Indications    History of pulmonary embolism (Resolved) [Z86.711]  Pulmonary embolism with infarction (H) [I26.99]  Long term (current) use of anticoagulants [Z79.01]  History of pulmonary embolism [Z86.711]             Comments:               Anticoagulation Care Providers       Provider Role Specialty Phone number    Promise Vanegas MD Referring Family Medicine [phone]    Donald Rooney MD Referring Internal Medicine [phone]    Jose Maria Morin MD Referring Family Medicine [phone]

## 2023-10-26 ENCOUNTER — MEDICAL CORRESPONDENCE (OUTPATIENT)
Dept: HEALTH INFORMATION MANAGEMENT | Facility: CLINIC | Age: 63
End: 2023-10-26
Payer: COMMERCIAL

## 2023-10-26 ENCOUNTER — TELEPHONE (OUTPATIENT)
Dept: FAMILY MEDICINE | Facility: CLINIC | Age: 63
End: 2023-10-26
Payer: COMMERCIAL

## 2023-10-26 ASSESSMENT — PATIENT HEALTH QUESTIONNAIRE - PHQ9: SUM OF ALL RESPONSES TO PHQ QUESTIONS 1-9: 13

## 2023-10-26 NOTE — TELEPHONE ENCOUNTER
Forms Request  Name of form/paperwork: Social Security paperwork  Have you been seen for this request:  Last OV with PCP- 06/22/2023  [facility] have the [facility]   When is form needed by: Asap  How would you like the form returned:   Patient Notified form requests are processed in 3-5 business days: Yes    Okay to leave a detailed message? Yes, please call either [phone] or [phone]  when ready for

## 2023-10-30 ENCOUNTER — ANTICOAGULATION THERAPY VISIT (OUTPATIENT)
Dept: ANTICOAGULATION | Facility: CLINIC | Age: 63
End: 2023-10-30

## 2023-10-30 DIAGNOSIS — Z86.711 HISTORY OF PULMONARY EMBOLISM: ICD-10-CM

## 2023-10-30 DIAGNOSIS — Z79.01 LONG TERM (CURRENT) USE OF ANTICOAGULANTS: ICD-10-CM

## 2023-10-30 DIAGNOSIS — I26.99 PULMONARY EMBOLISM WITH INFARCTION (H): Primary | ICD-10-CM

## 2023-10-30 NOTE — PROGRESS NOTES
Carla Zepeda Paxton care, calling in. Patient started hold yesterday and then received a call today that her spinal injection later this week has been canceled.she is looking for dosing.    Advised to take 12.5 mg today as she held 10 mg yesterday and then to continue maintenance. Katelyn will see patient on Wednesday and will check INR at that time.    Calendar has been adjusted to reflect the changes.    Kindra Garay RN    [facility]

## 2023-10-31 ENCOUNTER — TELEPHONE (OUTPATIENT)
Dept: ANTICOAGULATION | Facility: CLINIC | Age: 63
End: 2023-10-31
Payer: COMMERCIAL

## 2023-10-31 DIAGNOSIS — I26.99 PULMONARY EMBOLISM WITH INFARCTION (H): Primary | ICD-10-CM

## 2023-10-31 DIAGNOSIS — Z79.01 LONG TERM (CURRENT) USE OF ANTICOAGULANTS: ICD-10-CM

## 2023-10-31 DIAGNOSIS — Z86.711 HISTORY OF PULMONARY EMBOLISM: ICD-10-CM

## 2023-10-31 NOTE — TELEPHONE ENCOUNTER
Incoming call from home care RN Katelyn.    Valentina had been scheduled for spinal injections this Friday 11/3/23. 5 day warfarin hold with no bridge was ordered, see TE 10/16/23. The injections were cancelled and she ended up holding only one dose (Rufino 10/29/23).     Katelyn now updates that the injections have been rescheduled for Thursday 11/9/23.    She is already scheduled for an INR check tomorrow. Instructed to take her usual warfarin dose tonight and check tomorrow as planned. Will adjust if needed based on INR results.     Tracking calendar updated with new hold dates based on previous hold plan outlined in TE 10/16/23, will need to review with RN and patient with INR tomorrow.

## 2023-11-01 ENCOUNTER — ANTICOAGULATION THERAPY VISIT (OUTPATIENT)
Dept: ANTICOAGULATION | Facility: CLINIC | Age: 63
End: 2023-11-01
Payer: COMMERCIAL

## 2023-11-01 DIAGNOSIS — Z86.711 HISTORY OF PULMONARY EMBOLISM: ICD-10-CM

## 2023-11-01 DIAGNOSIS — Z79.01 LONG TERM (CURRENT) USE OF ANTICOAGULANTS: ICD-10-CM

## 2023-11-01 DIAGNOSIS — I26.99 PULMONARY EMBOLISM WITH INFARCTION (H): Primary | ICD-10-CM

## 2023-11-01 LAB — INR (EXTERNAL): 1.3 (ref 0.9–1.1)

## 2023-11-01 NOTE — PROGRESS NOTES
ANTICOAGULATION MANAGEMENT     Valentina Olmedo 63 year old female is on warfarin with subtherapeutic INR result. (Goal INR 2.0-3.0)    Recent labs: (last 7 days)     11/01/23  1416   INR 1.3*       ASSESSMENT     Source(s): Chart Review and Home Care/Facility Nurse     Warfarin doses taken: Patient held on 10/29/23, 10/30/23 12.5 mg boost was  direct to patient with recheck with home care today.   No new diet changes identified  Medication/supplement changes: None noted  New illness, injury, or hospitalization: No  Signs or symptoms of bleeding or clotting: No  Previous result: Therapeutic last 2(+) visits  Additional findings: Procedure rescheduled for 11/9/23, no bridge per procedure plan 10/20/23 TE, hold for 5 days prior. Went over bridge plan with home care nurse and patient       PLAN     Recommended plan for temporary change(s) affecting INR   [facility] consult due to rescheduled procedure.  Dosing Instructions: booster dose then continue your current warfarin dose with next INR in 2 weeks       Summary  As of 11/1/2023      Full warfarin instructions:  11/4: Hold; 11/5: Hold; 11/6: Hold; 11/7: Hold; 11/8: Hold; 11/9: 20 mg; Otherwise 10 mg every Sun, Thu; 7.5 mg all other days   Next INR check:  11/15/2023             gave dosing to home care nurseKatelyn,      Orders given to  Homecare nurse/facility to recheck    Education provided:   Please call back if any changes to your diet, medications or how you've been taking warfarin    Plan made with [facility] Pharmacist Paula Jimenes RN  Anticoagulation Clinic  11/1/2023    _______________________________________________________________________     Anticoagulation Episode Summary       Current INR goal:  2.0-3.0   TTR:  58.2% (1 y)   Target end date:  Indefinite   Send INR reminders to:  [facility]    Indications    History of pulmonary embolism (Resolved) [Z86.711]  Pulmonary embolism with infarction (H) [I26.99]  Long term (current) use of  anticoagulants [Z79.01]  History of pulmonary embolism [Z86.711]             Comments:               Anticoagulation Care Providers       Provider Role Specialty Phone number    Promise Vanegas MD Referring Family Medicine [phone]    Donald Rooney MD Referring Internal Medicine [phone]    Jose Maria Morin MD Referring Family Medicine [phone]

## 2023-11-03 ENCOUNTER — MEDICAL CORRESPONDENCE (OUTPATIENT)
Dept: HEALTH INFORMATION MANAGEMENT | Facility: CLINIC | Age: 63
End: 2023-11-03

## 2023-11-06 DIAGNOSIS — J44.9 CHRONIC OBSTRUCTIVE PULMONARY DISEASE, UNSPECIFIED COPD TYPE (H): ICD-10-CM

## 2023-11-06 DIAGNOSIS — F17.210 CIGARETTE NICOTINE DEPENDENCE: ICD-10-CM

## 2023-11-06 RX ORDER — VARENICLINE TARTRATE 1 MG/1
TABLET, FILM COATED ORAL
Qty: 56 TABLET | Refills: 2 | Status: SHIPPED | OUTPATIENT
Start: 2023-11-06 | End: 2024-01-22

## 2023-11-06 RX ORDER — SALMETEROL XINAFOATE 50 MCG
BLISTER, WITH INHALATION DEVICE INHALATION
Qty: 60 EACH | Refills: 2 | Status: SHIPPED | OUTPATIENT
Start: 2023-11-06 | End: 2024-01-22

## 2023-11-06 NOTE — PROGRESS NOTES
"Date:11/07/2023      COMPREHENSIVE PAIN CLINIC INITIAL EVALUATION    I had the pleasure of meeting Ms. Valentina Olmedo on 11/7/2023 in the [facility] in consult for Dr. Swanson with regards to her pain.  The patient is a 63 year old female with past medical history of chronic anticoagulation, h/o PE, physical deconditioning, CHRIS, GERD, seizure disorder, h/o CVA, cervical stenosis, chronic intractable pain, morbid obesity, schizoaffective disorder/depression, borderline personality disorder, PTSD who presents for evaluation of chronic pain.      Subjective: She presents with Al in a wheel chair.    Patient endorses chronic pain in cervical spine L>R that started years ago without a precipitating event.  Pain radiates down b/l arms R>L and into her hands.  Her arms feel weak.  Patient has constant numbness and tingling in b/l fingers .  Patient has not had any spine surgery in the past.  The patient describes the pain as constant, sharp, heavy, throbbing, dull, shooting.  She reports that the pain is made worse by \"unknown\".  Her pain is improved with \"unknown\".  She rates her currenty pain score at 4/10, but it can be as low as 4/10 or as severe as 9/10. [facility].  She uses a wheel chair at times due to injury to low back from a fall. She fx her L) elbow in September.      Patient denies any anxiety or depression.  Patient does follow with a [facility] mental health care provider every few month.  Last PT was 3-4 months ago.  She continues to do exercises on a regular basis.  Patient is attending hand therapy [facility] twice weekly.    Progress Notes Reviewed:  10/18/2023 Dr. Gracie Jacobsen, Neurological Surgery - cervical stenosis/chronic pain.  She needs to lose weight prior to surgery.  Discussed C7-T1 VIOLETA.  She wants to avoid surgery as long as she can.  10/17/2023 Dr. Mariann Puente -   10/13/2023 [facility], [facility]    She denies any new " problems with falls or balance, any new numbness or weakness of the arms or legs, any new bowel or bladder incontinence, any night sweats or unexplained fevers, or any sudden or unexpected weight loss.      Valentina Olmedo has not been seen at a pain clinic in the past.        Current Treatments:  She does not know her medication without looking them up on her phone.  Pregabalin 25mg TID per [facility]  Tegretol 200mg am and pm and 400mg q hs per Dr. Benitez for epilepsy  Meclizine 25mg 1/2 to 1 tab BID-TID dizziness - vertigo  Melatonin 3mg q hs  Oxybutynin ER - overactive bladder  Prasoin 2mg 1 tab q hs per Dr. Puente  Seroquel 25mg BID per Dr. Puente  Tizanidine 4mg TID  Chantix 1mg BID  Warfarin 5mg per anticoagulation clinic   Briviact 100mg TID - epilepsy    NOTE:  gabapentin causes rash, Lamictal causes dizziness    Previous Medication Treatments Included:  Anti-convulsants: gabapentin causes rash  Muscle relaxors:   Anti-depressants: Lamictal causes dizziness, Duloxetine 60mg 2 caps daily per Dr. Puente  Benzodiazapine's:   Acetaminophen/NSAIDs: No NSAID due to warfarin, acetaminophen is not effective  Topicals: biofreeze causes itching, burning,  Opioids: not suppose to take opioids with warfarin  Coumadin    Other Treatments Have Included:  Physical therapy: PT 3 months ago, currently in hand therapy at [facility]  Pain Psychology:   Chiropractic:   Acupuncture:   TENs Unit:   Injections:   Surgeries:   Dry Needling:   Massage:      Past Medical History:  Medical history reviewed.  Past Medical History:   Diagnosis Date    Adrenal mass (H24) 10/12/2015    Chen Null MD  They were incidentally discovered on the CAT scan of the abdomen from December 2011 which was done for evaluation of kidney stones. F/up CT of the abdomen done on 6/26/12 showed a stable 5 mm nodular opacity in the right lower lung lobe, adjacent to the diagram. Bilateral adrenal masses average density measured less than  0 Hounsfield units, consistent with benign etio    Anxiety     Anxiety     Arthritis     Borderline personality disorder (H)     Cancer (H)     COPD (chronic obstructive pulmonary disease) (H)     Depression     Depressive disorder     Hyperlipidemia     Hypertension     Hypertension     Hyponatremia     Hypothyroidism     Malignant neoplasm of thyroid gland (H)     Chen Null MD  She had R hemithyroidectomy for a right neck tumor in 1994. According to the patient, the tumor was benign. She is not sure whether or not the tumor belonged to the thyroid gland. The surgery was done here at the [place]. In January 2011, she was diagnosed with kidney stones. She was found to have an elevated calcium level of 11.7, with a PTH level of 368. Stone an    Primary hyperparathyroidism (H24)           PTSD (post-traumatic stress disorder)     Pulmonary embolism with infarction (H) 1/12/2021    Recurrent otitis media     Seizure disorder (H)     Stroke (H)     Vertigo       Patient Active Problem List   Diagnosis    Essential hypertension    CHRIS (obstructive sleep apnea)    High risk medication use    Seasonal and perennial allergic rhinoconjunctivitis of right eye    History of thyroid cancer    Adrenal mass, left (H24)    Class 3 severe obesity due to excess calories with serious comorbidity and body mass index (BMI) of 50.0 to 59.9 in adult (H)    Nonintractable epilepsy without status epilepticus (H)    Esophageal reflux    Mixed hyperlipidemia    HLD (hyperlipidemia)    Hypothyroidism    Tobacco abuse    Chronic obstructive pulmonary disease (H)    Schizoaffective disorder, depressive type (H)    History of COVID-19    DNR (do not resuscitate)    Migraine headache    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)    Fracture of vertebra due to osteoporosis with routine healing, subsequent encounter    Major depressive disorder, recurrent episode, moderate (H)    Venous stasis    Peripheral polyneuropathy    Pulmonary  embolism with infarction (H)    PTSD (post-traumatic stress disorder)    Long term (current) use of anticoagulants    History of melanoma    Seasonal affective disorder (H24)    Chronic kidney disease, stage 3b (H)    Vision changes    Bilateral hand pain    Problem related to housing and economic circumstances    History of pulmonary embolism         Past Surgical History:  Pertinent surgical history reviewed.  Past Surgical History:   Procedure Laterality Date    ABDOMEN SURGERY      ADRENALECTOMY Left     BRAIN SURGERY      CRANIOTOMY FOR TEMPORAL LOBECTOMY Right     x3 for seizure    DILATION AND CURETTAGE      HEAD & NECK SURGERY      HYSTERECTOMY, PAP NO LONGER INDICATED N/A     LITHOTRIPSY      PARATHYROID GLAND SURGERY      resection of adenoma    REFRACTIVE SURGERY Bilateral     RELEASE CARPAL TUNNEL Bilateral     TONSILLECTOMY & ADENOIDECTOMY      TOTAL THYROIDECTOMY      TOTAL VAGINAL HYSTERECTOMY      39 years old. Benign          Medications: Pertinent medications reviewed.  Current Outpatient Medications   Medication Sig Dispense Refill    albuterol (PROAIR HFA) 108 (90 Base) MCG/ACT inhaler Inhale 2 puffs into the lungs every 4 hours as needed for shortness of breath / dyspnea or wheezing 8 g 4    azelastine (OPTIVAR) 0.05 % ophthalmic solution Apply 1 drop to eye 2 times daily 10 mL 1    Brivaracetam (BRIVIACT) 100 MG tablet Take 150 mg by mouth 2 times daily       Calcium Citrate-Vitamin D (CALCIUM + D PO) Take 1 tablet by mouth daily       carBAMazepine (TEGRETOL) 200 MG tablet 200mg in the  at lunch and 400mg at HS      Cyanocobalamin (VITAMIN B12) 1000 MCG TBCR Take 1 tablet by mouth      denosumab (PROLIA) 60 MG/ML SOSY injection Inject 60 mg Subcutaneous once Per pt report      DULoxetine (CYMBALTA) 60 MG capsule Take 2 capsules (120 mg) by mouth daily 180 capsule 3    EPINEPHrine (ADRENACLICK JR) 0.15 MG/0.15ML injection 2-pack Inject 0.15 mLs (0.15 mg) into the muscle as needed for  anaphylaxis 2 each 1    EPINEPHrine (ANY BX GENERIC EQUIV) 0.3 MG/0.3ML injection 2-pack Inject 0.3 mLs (0.3 mg) into the muscle as needed for anaphylaxis 2 each 1    esomeprazole (NEXIUM) 40 MG DR capsule TAKE 1 CAPSULE BY MOUTH EVERY MORNING BEFORE BREAKFAST (TAKE 30-60 MINUTES BEFORE EATING) 30 capsule 4    fexofenadine (ALLEGRA) 180 MG tablet Take 1 tablet (180 mg) by mouth daily 90 tablet 3    fluticasone (FLONASE) 50 MCG/ACT nasal spray INHALE 1 SPRAY IN EACH NOSTRIL DAILY Strength: 50 MCG/ACT 16 g 11    levothyroxine (SYNTHROID/LEVOTHROID) 300 MCG tablet Take 1 tablet (300 mcg) by mouth daily 90 tablet 3    losartan (COZAAR) 25 MG tablet Take 0.5 tablets (12.5 mg) by mouth daily 90 tablet 1    meclizine (ANTIVERT) 25 MG tablet TAKE 1/2-1 TABLET BY MOUTH BY MOUTH THREE TIMES A DAY AS NEEDED FOR DIZZINESS 30 tablet 11    melatonin 3 MG tablet Take 1 tablet (3 mg) by mouth nightly as needed for sleep 90 tablet 1    multivitamin with iron (CHROMAGEN) TABS half-tab Take 1 tablet by mouth daily      neomycin-polymyxin-dexAMETHasone (MAXITROL) 3.5-94743-8.1 SUSP ophthalmic susp       nicotine (NICOTROL) 10 MG inhaler Use 1 cartridge as needed for urge to smoke by puffing over course of 20min.  Use 6-16 cart/day; reduce number of cart/day over 6-12 weeks. 168 each 1    Nutritional Supplements (NUTRITIONAL SHAKE HIGH PROTEIN) LIQD Take 1 each by mouth every morning Dispense Premier protein shakes. Replace breakfast with 1 shake daily. 9900 mL 11    olopatadine (PATADAY) 0.2 % ophthalmic solution Place 1 drop into both eyes daily 2.5 mL 5    oxybutynin ER (DITROPAN XL) 10 MG 24 hr tablet Take 1 tablet (10 mg) by mouth daily 90 tablet 3    Polyethylene Glycol POWD See Admin Instructions Use daily as needed      prazosin (MINIPRESS) 2 MG capsule Take 1 capsule (2 mg) by mouth at bedtime 90 capsule 1    pregabalin (LYRICA) 25 MG capsule Take 1 capsule (25 mg) by mouth 3 times daily 90 capsule 1    QUEtiapine (SEROQUEL) 25  MG tablet Take 1 tablet (25 mg) by mouth 2 times daily For agitation. OK to change to as needed. 180 tablet 1    rosuvastatin (CRESTOR) 20 MG tablet TAKE 1 TABLET BY MOUTH DAILY 28 tablet 12    salmeterol (SEREVENT DISKUS) 50 MCG/ACT inhaler INHALE 1 PUFF INTO THE LUNGS TWICE A DAY 60 each 2    senna (SENNA-TIME) 8.6 MG tablet TAKE 2 TABLETS (17.2MG) BY MOUTH TWICE A  tablet 11    SUMAtriptan (IMITREX) 50 MG tablet Take 1 tablet (50 mg) by mouth at onset of headache for migraine May repeat in 2 hours. Max 4 tablets/24 hours. 9 tablet 1    tiZANidine (ZANAFLEX) 4 MG capsule Take 4 mg by mouth 3 times daily Per pt report      topiramate (TOPAMAX) 50 MG tablet Take 1 tablet (50 mg) by mouth At Bedtime 90 tablet 3    varenicline (CHANTIX) 1 MG tablet TAKE 1 TABLET BY MOUTH TWICE A DAY 56 tablet 2    Vibegron (GEMTESA) 75 MG TABS tablet Take 75 mg by mouth daily      vitamin D3 (CHOLECALCIFEROL) 50 mcg (2000 units) tablet TAKE 2 TABLETS BY MOUTH DAILY 180 tablet 3    warfarin ANTICOAGULANT (COUMADIN) 5 MG tablet TAKE 2 TABLETS BY MOUTH ON MON AND FRI ; TAKE 2 & 1/2 TABLETS BY MOUTH ALL OTHER DAYS AS DIRECTED BY INR CLINIC 180 tablet 3       [place] Prescription Monitoring Program reviewed 11/6/2023.  No concern for abuse or misuse of controlled medications based on this report.  10/18/2023 Briviact 100mg 84 tabs for 28 days  10/17/2023 Pregabalin 25mg 84 tabs for 28 days  10/04/2023 Pregabalin 25mg 84 tabs for 28 days  09/27/2023 Briviact 100mg 84 tabs for 28 day    Allergies: Pertinent allergies reviewed.     Allergies   Allergen Reactions    Aspirin Shortness Of Breath    Bees Anaphylaxis    Lamictal [Lamotrigine] Dizziness    Latex Itching    Phenobarbital      aggression    Vistaril [Hydroxyzine] Other (See Comments)     Sluggish, unable to wake up    Gabapentin Rash       Family History:   family history includes Alcoholism in her brother and sister; Chronic Obstructive Pulmonary Disease in her mother; Depression  in her sister; Diabetes in her sister; Lung Cancer (age of onset: 76) in her maternal grandfather; No Known Problems in her sister; Other - See Comments in her brother and father; Skin Cancer in her father.    Social History:   Patient is  and lives in an apartment in [place].  She is independent in ADL's.  She has a cat.  She  reports that she has quit smoking. She has never used smokeless tobacco. She reports that she does not drink alcohol and does not use drugs.  Social History     Social History Narrative    Not on file         Review of Systems:      (Positive responses bolded)  GENERAL: fever/chills, fatigue, general unwell feeling, weight gain/loss  HEAD/EYES:  headache, dizziness, or vision changes  EARS/NOSE/THROAT: nosebleeds, hearing loss, sinus infection, earache, tinnitus  IMMUNE:  allergies, cancer, immune deficiency, or infections  SKIN:  itching, rash, hives  HEME/Lymphatic: anemia, easy bruising, easy bleeding  RESPIRATORY: cough, wheezing, or shortness of breath  CARDIOVASCULAR/Circulation: extremity edema, syncope, hypertension, tachycardia, or angina  GASTROINTESTINAL: abdominal pain, nausea/emesis, diarrhea, constipation, hematochezia, or melena  ENDOCRINE:  diabetes, steroid use, thyroid disease or osteoporosis  MUSCULOSKELETAL: myalgias, joint pain, stiffness, neck pain, back pain, arthritis, or gout  GENITOURINARY: frequency, urgency, dysuria, difficulty voiding, hematuria or incontinence  NEUROLOGIC: weakness, numbness, paresthesias, seizure, tremor, stroke or memory loss  PSYCHIATRIC: depression, anxiety, stress, suicidal thoughts/attempts x 1 or mood swings      Physical Exam:  LMP  (LMP Unknown)     Constitutional: She is oriented to person, place, and time.  She appears well-developed and well-nourished. She is not in acute distress. Morbidly obese.  HENT:     Head: Normocephalic and atraumatic.     Eyes: Pupils are equal, round, and reactive to light. EOM are normal. No  scleral icterus.   Pulmonary/Chest:  NWOB. No respiratory distress.   Neurological: She is alert and oriented to person, place, and time. Coordination grossly normal.  Romberg test negative. House's test is negative  Skin: Skin is warm and dry. She is not diaphoretic.   Psychiatric: She has a normal mood and affect. Her behavior is normal. Judgment and thought content normal.  Patient answers questions appropriately.  MSK: Gait is normal.  Patient cannot walk on toes, heals, heal toe walk and perform heal to shin testing without difficulty.    Cervical spine:  ROM: mildly restricted in all planes  Rotation/ext to right: painful   Rotation/ext to left: painful   Myofascial tenderness: occipital nerves, left paracervical muscles, right paracervical muscles  Normal 5/5 UE strength bilaterally   Normal sensation to light touch in the C4-T1 distributions bilaterally                 Imaging:        DEXA was reviewed today.  IMPRESSION: Low bone density (OSTEOPENIA) with interval increase in BMD from the prior scan. T score meets the WHO criteria for low bone density (osteopenia) at one or more measured sites. The risk of osteoporotic fracture increases approximately two-fold for each standard deviation decrease in T-score.      EMG:  na      Diagnosis:  (M48.02) Cervical stenosis of spinal canal  (primary encounter diagnosis)  Comment:   Plan:     (M54.81) Bilateral occipital neuralgia  Comment:   Plan:     (M54.12) Cervical radiculopathy  Comment:   Plan:       (G89.29) Chronic intractable pain  Comment:   Plan:           Plan:  A multimodal plan was developed today to treat your pain.  Multimodal analgesia is a strategy that reduces reliance on opioids through the use of non-opioid analgesics and therapies that have different mechanisms of action.      Diagnostics: cervical MRI was reviewed today.        Medications:  Start pregabalin 25mg TID which was your previous dose.  Every 2 wks add a tablet until you are  taking 2 tabs three times a day.        Current Treatments:  Pregabalin 25mg TID  Tegretol 200mg am and pm and 400mg q hs  Duloxetine 60mg 2 caps daily  Meclizine 25mg 1/2 to 1 tab prn dizziness  Melatonin 3mg q hs  Oxybutynin ER  Prasoin 2mg 1 tab q hs  Seroquel 25mg BID  Tizanidine 4mg TID  Chantix 1mg BID  Warfarin 5mg per anticoagulation clinic   Briviact 100mg TID - epilepsy    NOTE:  gabapentin causes rash, Lamictal causes dizziness      The following OTC pain medications may be helpful, use as directed: Voltaren Gel 1%, CBD products, Arnica products, Capsaicin products, Australian Dream Cream, Epson It, Arnica Products, Lidocaine Patch, Solanpas, Biofreeze, Aspercream, Tiger Balm and Jeromy Emu cream.  Apply heat or cold PRN.      Therapies:  Discussed Pain Psychology - consider in the future  Psychological treatments are also important part of pain management.  Understanding and managing the thoughts, emotions and behaviors that accompany the discomfort can help you cope more effectively with your pain and can actually reduce the intensity of your pain.      PHYSICAL THERAPY -  Encourage patient to continue working with physical therapy.  Discussed the importance of core strengthening, ROM, stretching exercises with the patient and how each of these entities is important in decreasing pain.  Explained to the patient that the purpose of physical therapy is to teach the patient a home exercise program.  These exercises need to be performed every day in order to decrease pain and prevent future occurrences of pain.        Discussed Grounding Mat - handout provided  https://www.youtube.com/watch?v=[email or web address]    Discussed Frequency Specific Microcurrent - handout provided  Treatment for Neuropathic Pain.   [facility] [phone]  [facility] [phone]  [facility] [phone]  [facility]ropractic [phone]  May be able to be billed as a chiropractic service depending on your  insurance coverage.     Discussed Acupuncture.    Fax for [employer] TENs unit.    Interventions:      B/l occipital nerve blocks with Dr. Zeng    [facility]    Follow up:   1-2 months in clinic after you are taking pregabalin 25mg 2 tabs TID.        SHAHRZAD Hernandez, RN, CNP, FNP  [facility]        BILLING TIME DOCUMENTATION:   The total TIME spent on this patient on the date of the encounter/appointment was 80 minutes.          Answers submitted by the patient for this visit:  RUY-7 (Submitted on 11/7/2023)  RUY 7 TOTAL SCORE: 11

## 2023-11-07 ENCOUNTER — OFFICE VISIT (OUTPATIENT)
Dept: PALLIATIVE MEDICINE | Facility: OTHER | Age: 63
End: 2023-11-07
Attending: ANESTHESIOLOGY
Payer: COMMERCIAL

## 2023-11-07 VITALS
DIASTOLIC BLOOD PRESSURE: 58 MMHG | BODY MASS INDEX: 54.54 KG/M2 | WEIGHT: 293 LBS | SYSTOLIC BLOOD PRESSURE: 127 MMHG | OXYGEN SATURATION: 93 % | HEART RATE: 86 BPM

## 2023-11-07 DIAGNOSIS — G89.4 CHRONIC PAIN SYNDROME: ICD-10-CM

## 2023-11-07 DIAGNOSIS — M48.02 CERVICAL STENOSIS OF SPINAL CANAL: Primary | ICD-10-CM

## 2023-11-07 DIAGNOSIS — G89.29 CHRONIC INTRACTABLE PAIN: ICD-10-CM

## 2023-11-07 DIAGNOSIS — M54.81 BILATERAL OCCIPITAL NEURALGIA: ICD-10-CM

## 2023-11-07 DIAGNOSIS — M54.12 CERVICAL RADICULOPATHY: ICD-10-CM

## 2023-11-07 PROCEDURE — 99205 OFFICE O/P NEW HI 60 MIN: CPT | Performed by: NURSE PRACTITIONER

## 2023-11-07 PROCEDURE — G0463 HOSPITAL OUTPT CLINIC VISIT: HCPCS | Performed by: NURSE PRACTITIONER

## 2023-11-07 ASSESSMENT — PAIN SCALES - PAIN ENJOYMENT GENERAL ACTIVITY SCALE (PEG)
INTERFERED_ENJOYMENT_LIFE: 7
INTERFERED_GENERAL_ACTIVITY: 6
AVG_PAIN_PASTWEEK: 10 - PAIN AS BAD AS YOU CAN IMAGINE
PEG_TOTALSCORE: 7.67

## 2023-11-07 ASSESSMENT — ANXIETY QUESTIONNAIRES
IF YOU CHECKED OFF ANY PROBLEMS ON THIS QUESTIONNAIRE, HOW DIFFICULT HAVE THESE PROBLEMS MADE IT FOR YOU TO DO YOUR WORK, TAKE CARE OF THINGS AT HOME, OR GET ALONG WITH OTHER PEOPLE: EXTREMELY DIFFICULT
GAD7 TOTAL SCORE: 11
7. FEELING AFRAID AS IF SOMETHING AWFUL MIGHT HAPPEN: NEARLY EVERY DAY
1. FEELING NERVOUS, ANXIOUS, OR ON EDGE: MORE THAN HALF THE DAYS
3. WORRYING TOO MUCH ABOUT DIFFERENT THINGS: SEVERAL DAYS
2. NOT BEING ABLE TO STOP OR CONTROL WORRYING: SEVERAL DAYS
6. BECOMING EASILY ANNOYED OR IRRITABLE: MORE THAN HALF THE DAYS
5. BEING SO RESTLESS THAT IT IS HARD TO SIT STILL: SEVERAL DAYS
GAD7 TOTAL SCORE: 11
4. TROUBLE RELAXING: SEVERAL DAYS

## 2023-11-07 ASSESSMENT — PAIN SCALES - GENERAL: PAINLEVEL: MODERATE PAIN (4)

## 2023-11-07 NOTE — PATIENT INSTRUCTIONS
[facility] Number:  [phone]  Call with any questions about your care and for scheduling assistance.   Calls are returned Monday through Friday between 8 AM and 4:30 PM. We usually get back to you within 2 business days depending on the issue/request.    If we are prescribing your medications:  For opioid medication refills, call the clinic or send a [employer] message 7 days in advance.  Please include:  Name of requested medication  Name of the pharmacy.  For non-opioid medications, call your pharmacy directly to request a refill. Please allow 3-4 days to be processed.   Per [place] State Law:  All controlled substance prescriptions must be filled within 30 days of being written.    For those controlled substances allowing refills, pickup must occur within 30 days of last fill.      We believe regular attendance is key to your success in our program!    Any time you are unable to keep your appointment we ask that you call us at least 24 hours in advance to cancel.This will allow us to offer the appointment time to another patient.   Multiple missed appointments may lead to dismissal from the clinic.     Plan:  A multimodal plan was developed today to treat your pain.  Multimodal analgesia is a strategy that reduces reliance on opioids through the use of non-opioid analgesics and therapies that have different mechanisms of action.      Diagnostics: cervical MRI was reviewed today.        Medications:  Start pregabalin 25mg TID which was your previous dose.  Every 2 wks add a tablet until you are taking 2 tabs three times a day.        Current Treatments:  Pregabalin 25mg TID  Tegretol 200mg am and pm and 400mg q hs  Duloxetine 60mg 2 caps daily  Meclizine 25mg 1/2 to 1 tab prn dizziness  Melatonin 3mg q hs  Oxybutynin ER  Prasoin 2mg 1 tab q hs  Seroquel 25mg BID  Tizanidine 4mg TID  Chantix 1mg BID  Warfarin 5mg per anticoagulation clinic   Briviact 100mg TID -  epilepsy    NOTE:  gabapentin causes rash, Lamictal causes dizziness      Discussed medical cannabis.    The following OTC pain medications may be helpful, use as directed: Voltaren Gel 1%, CBD products, Arnica products, Capsaicin products, Australian Dream Cream, Epson It, Arnica Products, Lidocaine Patch, Solanpas, Biofreeze, Aspercream, Tiger Balm and Jeromy Emu cream.  Apply heat or cold PRN.      Therapies:  Discussed Pain Psychology - consider in the future  Psychological treatments are also important part of pain management.  Understanding and managing the thoughts, emotions and behaviors that accompany the discomfort can help you cope more effectively with your pain and can actually reduce the intensity of your pain.      PHYSICAL THERAPY -  Encourage patient to continue working with physical therapy.  Discussed the importance of core strengthening, ROM, stretching exercises with the patient and how each of these entities is important in decreasing pain.  Explained to the patient that the purpose of physical therapy is to teach the patient a home exercise program.  These exercises need to be performed every day in order to decrease pain and prevent future occurrences of pain.        Discussed Grounding Mat - handout provided  https://www.Humagade.com/watch?v=[email or web address]    Discussed Frequency Specific Microcurrent - handout provided  Treatment for Neuropathic Pain.   [facility] [phone]  [facility] [phone]  [facility] [phone]  [facility] [phone]  May be able to be billed as a chiropractic service depending on your insurance coverage.     Discussed Acupuncture.    Fax for [employer] TEN[facility] unit.    Interventions:      B/l occipital nerve blocks with Dr. Zeng    [facility] appt [facility]    Follow up:   1-2 months in clinic after you are taking pregabalin 25mg 2 tabs TID.        Laure Olivares, APRN, RN, CNP, FNP  [facility]  [facility]

## 2023-11-07 NOTE — PROGRESS NOTES
Patient presents to the clinic today for a visit with SHAHRZAD Clark CNP regarding Pain Management.    {  UDS/CSA- 08.18.2022    Medications- no opioids, or other controlled medications.    Some areas are pins and needles and some is constant pain, heavy dull pain that gives her headaches.     Alison Hickman  [facility] Visit Facilitator

## 2023-11-08 ENCOUNTER — MEDICAL CORRESPONDENCE (OUTPATIENT)
Dept: HEALTH INFORMATION MANAGEMENT | Facility: CLINIC | Age: 63
End: 2023-11-08
Payer: COMMERCIAL

## 2023-11-08 RX ORDER — PREGABALIN 25 MG/1
CAPSULE ORAL
Qty: 180 CAPSULE | Refills: 1 | Status: SHIPPED | OUTPATIENT
Start: 2023-11-08 | End: 2023-12-22

## 2023-11-08 NOTE — TELEPHONE ENCOUNTER
Informed pt form is completed and pt will like to pick it up today at the clinic sometime. Form is ready for  at . xl

## 2023-11-09 ENCOUNTER — RADIOLOGY INJECTION OFFICE VISIT (OUTPATIENT)
Dept: PHYSICAL MEDICINE AND REHAB | Facility: CLINIC | Age: 63
End: 2023-11-09
Attending: PHYSICIAN ASSISTANT
Payer: COMMERCIAL

## 2023-11-09 ENCOUNTER — ANTICOAGULATION THERAPY VISIT (OUTPATIENT)
Dept: ANTICOAGULATION | Facility: CLINIC | Age: 63
End: 2023-11-09

## 2023-11-09 VITALS
OXYGEN SATURATION: 98 % | TEMPERATURE: 98 F | RESPIRATION RATE: 22 BRPM | DIASTOLIC BLOOD PRESSURE: 68 MMHG | HEART RATE: 78 BPM | SYSTOLIC BLOOD PRESSURE: 120 MMHG

## 2023-11-09 DIAGNOSIS — M54.12 CERVICAL RADICULITIS: ICD-10-CM

## 2023-11-09 DIAGNOSIS — Z79.01 LONG TERM (CURRENT) USE OF ANTICOAGULANTS: ICD-10-CM

## 2023-11-09 DIAGNOSIS — I26.99 PULMONARY EMBOLISM WITH INFARCTION (H): Primary | ICD-10-CM

## 2023-11-09 DIAGNOSIS — Z86.711 HISTORY OF PULMONARY EMBOLISM: ICD-10-CM

## 2023-11-09 DIAGNOSIS — Z79.01 ANTICOAGULATED: ICD-10-CM

## 2023-11-09 LAB — INR POINT OF CARE: 1 (ref 0.9–1.1)

## 2023-11-09 PROCEDURE — 85610 PROTHROMBIN TIME: CPT | Performed by: PAIN MEDICINE

## 2023-11-09 PROCEDURE — 62321 NJX INTERLAMINAR CRV/THRC: CPT | Performed by: PAIN MEDICINE

## 2023-11-09 PROCEDURE — 36416 COLLJ CAPILLARY BLOOD SPEC: CPT | Performed by: PAIN MEDICINE

## 2023-11-09 RX ORDER — LIDOCAINE HYDROCHLORIDE 10 MG/ML
INJECTION, SOLUTION EPIDURAL; INFILTRATION; INTRACAUDAL; PERINEURAL
Status: COMPLETED | OUTPATIENT
Start: 2023-11-09 | End: 2023-11-09

## 2023-11-09 RX ORDER — DEXAMETHASONE SODIUM PHOSPHATE 10 MG/ML
INJECTION, SOLUTION INTRAMUSCULAR; INTRAVENOUS
Status: COMPLETED | OUTPATIENT
Start: 2023-11-09 | End: 2023-11-09

## 2023-11-09 RX ADMIN — LIDOCAINE HYDROCHLORIDE 1 ML: 10 INJECTION, SOLUTION EPIDURAL; INFILTRATION; INTRACAUDAL; PERINEURAL at 15:46

## 2023-11-09 RX ADMIN — DEXAMETHASONE SODIUM PHOSPHATE 10 MG: 10 INJECTION, SOLUTION INTRAMUSCULAR; INTRAVENOUS at 15:47

## 2023-11-09 ASSESSMENT — PAIN SCALES - GENERAL
PAINLEVEL: MILD PAIN (2)
PAINLEVEL: NO PAIN (1)

## 2023-11-09 NOTE — PROGRESS NOTES
ANTICOAGULATION MANAGEMENT     Valentina Olmedo 63 year old female is on warfarin with subtherapeutic INR result. (Goal INR 2.0-3.0)    Recent labs: (last 7 days)     11/09/23  1517   INR 1.0       ASSESSMENT     Source(s): Chart Review     Warfarin doses taken:  warfarin is being held for injection today, this is an expected low result  Diet: No new diet changes identified  Medication/supplement changes: None noted  New illness, injury, or hospitalization: No  Signs or symptoms of bleeding or clotting: No  Previous result: Subtherapeutic  Additional findings: Day of procedure result was routed to [facility], next INR is already ordered with home care next week       PLAN     Recommended plan for temporary change(s) affecting INR     Dosing Instructions: Continue your current warfarin dose per post procedure resumption plan with next INR in 1 week       Summary  As of 11/9/2023      Full warfarin instructions:  11/9: 20 mg; Otherwise 10 mg every Sun, Thu; 7.5 mg all other days   Next INR check:  11/16/2023               Home care nurse and patient were already instructed with INR on 11/1/23.    Orders given to  Homecare nurse/facility to recheck      Plan made per [facility] anticoagulation protocol    Fanta Smith RN  Anticoagulation Clinic  11/9/2023    _______________________________________________________________________     Anticoagulation Episode Summary       Current INR goal:  2.0-3.0   TTR:  57.4% (1 y)   Target end date:  Indefinite   Send INR reminders to:  [facility]    Indications    History of pulmonary embolism (Resolved) [Z86.711]  Pulmonary embolism with infarction (H) [I26.99]  Long term (current) use of anticoagulants [Z79.01]  History of pulmonary embolism [Z86.711]             Comments:               Anticoagulation Care Providers       Provider Role Specialty Phone number    Promise Vanegas MD Referring Family Medicine [phone]    Donald Rooney MD Referring Internal Medicine  [phone]    Jose Maria Morin MD [facility] Family Medicine [phone]

## 2023-11-09 NOTE — PATIENT INSTRUCTIONS
DISCHARGE INSTRUCTIONS    During office hours (8:00 a.m.- 4:00 p.m.) questions or concerns may be answered  by calling Sp[facility] Center Navigation Nurses at  [phone].  Messages received after hours will be returned the following business day.      In the case of an emergency, please dial 911 or seek assistance at the nearest Emergency Room/Urgent Care facility.     All Patients:    You may experience an increase in your symptoms for the first 2 days (It may take anywhere between 2 days- 2 weeks for the steroid to have maximum effect).    You may use ice on the injection site, as frequently as 20 minutes each hour if needed.    You may take your pain medicine.    You may continue taking your regular medication after your injection. If you have had a Medial Branch Block you may resume pain medication once your pain diary is completed.    You may shower. No swimming, tub bath or hot tub for 48 hours.  You may remove your bandaid/bandage as soon as you are home.    You may resume light activities, as tolerated.    Resume your usual diet as tolerated.    It is strongly advised that you do not drive for 1-3 hours post injection.    If you have had oral sedation:  Do not drive for 8 hours post injection.      If you have had IV sedation:  Do not drive for 24 hours post injection.  Do not operate hazardous machinery or make important personal/business decisions for 24 hours.      POSSIBLE STEROID SIDE EFFECTS (If steroid/cortisone was used for your procedure)    -If you experience these symptoms, it should only last for a short period    Swelling of the legs              Skin redness (flushing)     Mouth (oral) irritation   Blood sugar (glucose) levels            Sweats                    Mood changes  Headache  Sleeplessness  Weakened immune system for up to 14 days, which could increase the risk of darryl the COVID-19 virus and/or experiencing more severe symptoms of the disease, if exposed.  Decreased  effectiveness of the flu vaccine if given within 2 weeks of the steroid.         POSSIBLE PROCEDURE SIDE EFFECTS  -Call the Spine Center if you are concerned  Increased Pain           Increased numbness/tingling      Nausea/Vomiting          Bruising/bleeding at site      Redness or swelling                                              Difficulty walking      Weakness           Fever greater than 100.5    *In the event of a severe headache after an epidural steroid injection that is relieved by lying down, please call the [facility] to speak with a clinical staff member*

## 2023-11-14 ENCOUNTER — OFFICE VISIT (OUTPATIENT)
Dept: INTERNAL MEDICINE | Facility: CLINIC | Age: 63
End: 2023-11-14
Payer: COMMERCIAL

## 2023-11-14 VITALS
RESPIRATION RATE: 16 BRPM | OXYGEN SATURATION: 96 % | DIASTOLIC BLOOD PRESSURE: 70 MMHG | HEART RATE: 85 BPM | SYSTOLIC BLOOD PRESSURE: 102 MMHG

## 2023-11-14 DIAGNOSIS — M81.8 IDIOPATHIC OSTEOPOROSIS: Primary | ICD-10-CM

## 2023-11-14 DIAGNOSIS — J44.9 CHRONIC OBSTRUCTIVE PULMONARY DISEASE, UNSPECIFIED COPD TYPE (H): ICD-10-CM

## 2023-11-14 DIAGNOSIS — Z72.0 TOBACCO ABUSE: ICD-10-CM

## 2023-11-14 DIAGNOSIS — G40.909 NONINTRACTABLE EPILEPSY WITHOUT STATUS EPILEPTICUS, UNSPECIFIED EPILEPSY TYPE (H): ICD-10-CM

## 2023-11-14 DIAGNOSIS — E03.9 ACQUIRED HYPOTHYROIDISM: ICD-10-CM

## 2023-11-14 DIAGNOSIS — K21.00 GASTROESOPHAGEAL REFLUX DISEASE WITH ESOPHAGITIS WITHOUT HEMORRHAGE: ICD-10-CM

## 2023-11-14 DIAGNOSIS — Z86.711 HISTORY OF PULMONARY EMBOLISM: ICD-10-CM

## 2023-11-14 DIAGNOSIS — Z79.01 LONG TERM (CURRENT) USE OF ANTICOAGULANTS: ICD-10-CM

## 2023-11-14 LAB
ANION GAP SERPL CALCULATED.3IONS-SCNC: 11 MMOL/L (ref 7–15)
BUN SERPL-MCNC: 23.9 MG/DL (ref 8–23)
CALCIUM SERPL-MCNC: 8.7 MG/DL (ref 8.8–10.2)
CHLORIDE SERPL-SCNC: 109 MMOL/L (ref 98–107)
CREAT SERPL-MCNC: 0.65 MG/DL (ref 0.51–0.95)
DEPRECATED HCO3 PLAS-SCNC: 21 MMOL/L (ref 22–29)
EGFRCR SERPLBLD CKD-EPI 2021: >90 ML/MIN/1.73M2
GLUCOSE SERPL-MCNC: 99 MG/DL (ref 70–99)
POTASSIUM SERPL-SCNC: 4 MMOL/L (ref 3.4–5.3)
SODIUM SERPL-SCNC: 141 MMOL/L (ref 135–145)
VIT D+METAB SERPL-MCNC: 32 NG/ML (ref 20–50)

## 2023-11-14 PROCEDURE — G0008 ADMIN INFLUENZA VIRUS VAC: HCPCS | Performed by: INTERNAL MEDICINE

## 2023-11-14 PROCEDURE — 96372 THER/PROPH/DIAG INJ SC/IM: CPT | Performed by: INTERNAL MEDICINE

## 2023-11-14 PROCEDURE — 90682 RIV4 VACC RECOMBINANT DNA IM: CPT | Performed by: INTERNAL MEDICINE

## 2023-11-14 PROCEDURE — 82306 VITAMIN D 25 HYDROXY: CPT | Performed by: INTERNAL MEDICINE

## 2023-11-14 PROCEDURE — 80048 BASIC METABOLIC PNL TOTAL CA: CPT | Performed by: INTERNAL MEDICINE

## 2023-11-14 PROCEDURE — 36415 COLL VENOUS BLD VENIPUNCTURE: CPT | Performed by: INTERNAL MEDICINE

## 2023-11-14 PROCEDURE — 82330 ASSAY OF CALCIUM: CPT | Performed by: INTERNAL MEDICINE

## 2023-11-14 PROCEDURE — 99214 OFFICE O/P EST MOD 30 MIN: CPT | Mod: 25 | Performed by: INTERNAL MEDICINE

## 2023-11-14 ASSESSMENT — PATIENT HEALTH QUESTIONNAIRE - PHQ9
10. IF YOU CHECKED OFF ANY PROBLEMS, HOW DIFFICULT HAVE THESE PROBLEMS MADE IT FOR YOU TO DO YOUR WORK, TAKE CARE OF THINGS AT HOME, OR GET ALONG WITH OTHER PEOPLE: VERY DIFFICULT
SUM OF ALL RESPONSES TO PHQ QUESTIONS 1-9: 16
SUM OF ALL RESPONSES TO PHQ QUESTIONS 1-9: 16

## 2023-11-14 NOTE — PROGRESS NOTES
(M81.8) Idiopathic osteoporosis  (primary encounter diagnosis)  Comment: History of fractures: L1, L2, L5 compression fracture since Jan 2020. She also repots h/o ribs fracture and ankle fracture.  FH: negative for osteoporosis.  On Prolia since 2020, tolerating it well.  Prolia # 7 today.  DXA 10/2023 reviewed and discussed:  -Lumbar Spine: L3-L4: BMD: 1.087 g/cm2. T-score: -0.9. Z-score: 0.5. L1 and L2 omitted due to known compression fracture  -RIGHT Hip Total: BMD: 0.941 g/cm2. T-score: -0.5. Z-score: 0.5.  -RIGHT Hip Femoral neck: BMD: 0.764 g/cm2. T-score: -2.0. Z-score: -0.6.  -LEFT Hip Total: BMD: 0.900 g/cm2. T-score: -0.9. Z-score: 0.2.  -LEFT Hip Femoral neck: BMD: 0.726 g/cm2. T-score: -2.2. Z-score: -0.9.  -RIGHT Radius 33%: BMD: 0.782 g/cm2. T-score: -1.1. Z-score: 0.1.  INTERVAL CHANGE  -There has been a 0.4% increase in lumbar spine BMD.  -There has been a 2.3% increase in the left hip BMD.  -There has been a 13.7% increase in the right radius 33% BMD.    She is taking Ca pill bid and vit D 2000 international unit(s) daily.  We will do the labs today.    Plan: Basic metabolic panel, Ionized Calcium, Vitamin        D Deficiency            (E03.9) Acquired hypothyroidism  Comment: stable on levothyroxine  Component      Latest Ref Rng 2/17/2023  2:34 PM   TSH      0.30 - 4.20 uIU/mL 1.19    Excessive thyroid hormone causes osteoporosis and fractures. This can occur when hyperthyroid disease goes untreated or patient is taking too much levothyroxine. One study of women with osteoporosis concluded that taking a high dose of thyroid hormone (>150 micrograms/day) increased their fracture risk more than a 50%.       (Z72.0) Tobacco abuse  Comment: she did stop smoking in Feb 2023. I congratulated her today. She will try to stop taking Chantix.      (G40.235) Nonintractable epilepsy without status epilepticus, unspecified epilepsy type (H)  Comment:  long history of epilepsy and schizoaffective disorder -  On carbamazepine and peganone now, but was on Topamax and valproate.    Antiseizure medications (particularly phenobarbital and phenytoin, carbamazepine) can cause bone density loss.     (Z86.711) History of pulmonary embolism    (Z79.01) Long term (current) use of anticoagulants  On warfarin for 3 years now.  Warfarin inhibits the carboxylation of osteocalcin, reduces bone calcium deposition, inhibits bone mineralization, promotes bone resorption and causes osteoporosis. Heparin-induced osteoporosis occurs in the spine and ribs, which may be related to the increase in the dissolution of bone collagen, the promotion of bone resorption and the inhibition of bone formation.       (J44.9) Chronic obstructive pulmonary disease, unspecified COPD type (H)  Comment: stable on Serevent and albuterol prn. Not on oral on inhaled steroids   Steroids are the most common cause of drug-induced osteoporosis and fractures.The medications are such powerful bone destroyers that they cause bone loss and can lead to fractures in 30-50% of people. Fracture risk increases after just three months. Even very small doses of oral glucocorticoids (< 2.5 mg/day over approximately 6 months) are associated with a 20% to 200% increase in risk of vertebral fractures. And for every 10-mg increase in dose there s a 62% increase in risk for bone fracture. Inhaled corticosteroids are effective for asthma treatment, but they can contribute to the development of osteoporosis in susceptible individuals when used in high doses.      (K21.00) Gastroesophageal reflux disease with esophagitis without hemorrhage  Comment: stable on Nexium  Acid blocking medications, such as Aciphex, Nexium, Prevacid, Protonix, Prilosec and Nexium are commonly used for heartburn. These medications can increase osteoporosis and fracture risk. A large study of more than 13,000 patients concluded that hip fracture risk increases by 22% after one year of taking the medication and  by 54% after 3 years       Patient was educated on safety of Prolia utilizing Patient Counseling Chart for Healthcare Providers, as outlined by the Prolia REMS progam.     Return in about 6 months (around 5/14/2024) for Prolia with CSS.    Patient Instructions   Prolia 7th today. Flu vaccine. Labs today.  Prolia 8th in 6 months with my nurse. I will see you in 1 year.    DXA in 10/2025.   Phone number to schedule [phone].    Daily calcium need is 7346-4198 mg a day from the diet and supplements.  Calcium citrate is easier to digest.  Vitamin D 2000 IU daily recommended.    Risk of rebound vertebral fractures is higher when Prolia suddenly stopped or dose was missed.      Prolia and Covid vaccine should be  for at least a week.           /70   Pulse 85   Resp 16   LMP  (LMP Unknown)   SpO2 96%       Did you experience any problems with previous Prolia injection? no  Any medication change in the last 6 months? no  Did you take prednisone or other immunosupressant drugs in the last 6 months   (chemo, transplant, rheum, dermatology conditions)? no  Did you have any serious infection in the last 6 months?no  Any recent hospitalizations?no  Do you plan any dental work in the next 2-3 months?no  How much calcium do you take daily from the diet and supplements?1200 mg  How much vit D do you take daily? 2000 IU  Last DXA? 10/2023 Reviewed and discussed      Patient is here today for the  Prolia injection. Patient tolerated previous injections well.   We discussed calcium and vit D daily needs today.       We discussed high risk of rebound vertebral fractures when Prolia suddenly stopped.    Next Prolia injection will be in 6 months.           This note has been dictated using voice recognition software. Any grammatical or context distortions are unintentional and inherent to the software      Patient Active Problem List   Diagnosis    Essential hypertension    CHRIS (obstructive sleep apnea)    High  risk medication use    Seasonal and perennial allergic rhinoconjunctivitis of right eye    History of thyroid cancer    Adrenal mass, left (H24)    Class 3 severe obesity due to excess calories with serious comorbidity and body mass index (BMI) of 50.0 to 59.9 in adult (H)    Nonintractable epilepsy without status epilepticus (H)    Esophageal reflux    Mixed hyperlipidemia    HLD (hyperlipidemia)    Hypothyroidism    Tobacco abuse    Chronic obstructive pulmonary disease (H)    Schizoaffective disorder, depressive type (H)    History of COVID-19    DNR (do not resuscitate)    Migraine headache    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)    Fracture of vertebra due to osteoporosis with routine healing, subsequent encounter    Major depressive disorder, recurrent episode, moderate (H)    Venous stasis    Peripheral polyneuropathy    Pulmonary embolism with infarction (H)    Idiopathic osteoporosis    PTSD (post-traumatic stress disorder)    Long term (current) use of anticoagulants    History of melanoma    Seasonal affective disorder (H24)    Chronic kidney disease, stage 3b (H)    Vision changes    Bilateral hand pain    Problem related to housing and economic circumstances    History of pulmonary embolism       Current Outpatient Medications   Medication    albuterol (PROAIR HFA) 108 (90 Base) MCG/ACT inhaler    azelastine (OPTIVAR) 0.05 % ophthalmic solution    Brivaracetam (BRIVIACT) 100 MG tablet    Calcium Citrate-Vitamin D (CALCIUM + D PO)    carBAMazepine (TEGRETOL) 200 MG tablet    Cyanocobalamin (VITAMIN B12) 1000 MCG TBCR    denosumab (PROLIA) 60 MG/ML SOSY injection    DULoxetine (CYMBALTA) 60 MG capsule    EPINEPHrine (ADRENACLICK JR) 0.15 MG/0.15ML injection 2-pack    EPINEPHrine (ANY BX GENERIC EQUIV) 0.3 MG/0.3ML injection 2-pack    esomeprazole (NEXIUM) 40 MG DR capsule    fexofenadine (ALLEGRA) 180 MG tablet    fluticasone (FLONASE) 50 MCG/ACT nasal spray    levothyroxine (SYNTHROID/LEVOTHROID) 300 MCG  tablet    losartan (COZAAR) 25 MG tablet    melatonin 3 MG tablet    multivitamin with iron (CHROMAGEN) TABS half-tab    neomycin-polymyxin-dexAMETHasone (MAXITROL) 3.5-31359-1.1 SUSP ophthalmic susp    nicotine (NICOTROL) 10 MG inhaler    Nutritional Supplements (NUTRITIONAL SHAKE HIGH PROTEIN) LIQD    olopatadine (PATADAY) 0.2 % ophthalmic solution    oxybutynin ER (DITROPAN XL) 10 MG 24 hr tablet    Polyethylene Glycol POWD    prazosin (MINIPRESS) 2 MG capsule    pregabalin (LYRICA) 25 MG capsule    pregabalin (LYRICA) 25 MG capsule    QUEtiapine (SEROQUEL) 25 MG tablet    rosuvastatin (CRESTOR) 20 MG tablet    salmeterol (SEREVENT DISKUS) 50 MCG/ACT inhaler    senna (SENNA-TIME) 8.6 MG tablet    SUMAtriptan (IMITREX) 50 MG tablet    tiZANidine (ZANAFLEX) 4 MG capsule    topiramate (TOPAMAX) 50 MG tablet    varenicline (CHANTIX) 1 MG tablet    Vibegron (GEMTESA) 75 MG TABS tablet    vitamin D3 (CHOLECALCIFEROL) 50 mcg (2000 units) tablet    warfarin ANTICOAGULANT (COUMADIN) 5 MG tablet     Current Facility-Administered Medications   Medication    denosumab (PROLIA) injection 60 mg    denosumab (PROLIA) injection 60 mg

## 2023-11-14 NOTE — PATIENT INSTRUCTIONS
Prolia 7th today. Flu vaccine. Labs today.  Prolia 8th in 6 months with my nurse. I will see you in 1 year.    DXA in 10/2025.   Phone number to schedule [phone].    Daily calcium need is 8653-2337 mg a day from the diet and supplements.  Calcium citrate is easier to digest.  Vitamin D 2000 IU daily recommended.    Risk of rebound vertebral fractures is higher when Prolia suddenly stopped or dose was missed.      Prolia and Covid vaccine should be  for at least a week.

## 2023-11-15 ENCOUNTER — ANTICOAGULATION THERAPY VISIT (OUTPATIENT)
Dept: ANTICOAGULATION | Facility: CLINIC | Age: 63
End: 2023-11-15
Payer: COMMERCIAL

## 2023-11-15 ENCOUNTER — TELEPHONE (OUTPATIENT)
Dept: PALLIATIVE MEDICINE | Facility: OTHER | Age: 63
End: 2023-11-15
Payer: COMMERCIAL

## 2023-11-15 DIAGNOSIS — Z79.01 LONG TERM (CURRENT) USE OF ANTICOAGULANTS: ICD-10-CM

## 2023-11-15 DIAGNOSIS — I26.99 PULMONARY EMBOLISM WITH INFARCTION (H): Primary | ICD-10-CM

## 2023-11-15 DIAGNOSIS — Z86.711 HISTORY OF PULMONARY EMBOLISM: ICD-10-CM

## 2023-11-15 LAB
CA-I BLD-MCNC: 4.7 MG/DL (ref 4.4–5.2)
INR (EXTERNAL): 1.2 (ref 0.9–1.1)

## 2023-11-15 NOTE — TELEPHONE ENCOUNTER
Prior Authorization Retail Medication Request    Medication/Dose: pregabalin (LYRICA) 25 MG capsule  Diagnosis and ICD code (if different than what is on RX):  Cervical radiculopathy [M54.12]     [employer] - [employer]  Subscriber:  Valentina Olmedo  Subscriber ID:[id number]  Relationship:Self  Member:Valentina Olmedo  Member ID:[id number]  LOB:None  Plan year:  1/1/2023 - [place]  Effective dates:  1/1/2023 - [place]  Group number:  [id number]       Pharmacy Information (if different than what is on RX)  [place]    [facility] [place] [address], [address]     Phone:  [phone]   Fax:[phone]

## 2023-11-16 ENCOUNTER — MEDICAL CORRESPONDENCE (OUTPATIENT)
Dept: CARDIAC REHAB | Facility: HOSPITAL | Age: 63
End: 2023-11-16
Payer: COMMERCIAL

## 2023-11-17 ENCOUNTER — ANTICOAGULATION THERAPY VISIT (OUTPATIENT)
Dept: ANTICOAGULATION | Facility: CLINIC | Age: 63
End: 2023-11-17

## 2023-11-17 ENCOUNTER — ANCILLARY PROCEDURE (OUTPATIENT)
Dept: GENERAL RADIOLOGY | Facility: CLINIC | Age: 63
End: 2023-11-17
Attending: FAMILY MEDICINE
Payer: COMMERCIAL

## 2023-11-17 ENCOUNTER — TELEPHONE (OUTPATIENT)
Dept: FAMILY MEDICINE | Facility: CLINIC | Age: 63
End: 2023-11-17

## 2023-11-17 ENCOUNTER — MEDICAL CORRESPONDENCE (OUTPATIENT)
Dept: HEALTH INFORMATION MANAGEMENT | Facility: CLINIC | Age: 63
End: 2023-11-17

## 2023-11-17 ENCOUNTER — OFFICE VISIT (OUTPATIENT)
Dept: FAMILY MEDICINE | Facility: CLINIC | Age: 63
End: 2023-11-17
Payer: COMMERCIAL

## 2023-11-17 VITALS
HEART RATE: 96 BPM | BODY MASS INDEX: 45.99 KG/M2 | DIASTOLIC BLOOD PRESSURE: 85 MMHG | TEMPERATURE: 97.9 F | WEIGHT: 293 LBS | SYSTOLIC BLOOD PRESSURE: 130 MMHG | OXYGEN SATURATION: 93 % | RESPIRATION RATE: 16 BRPM | HEIGHT: 67 IN

## 2023-11-17 DIAGNOSIS — J30.89 SEASONAL AND PERENNIAL ALLERGIC RHINOCONJUNCTIVITIS OF RIGHT EYE: ICD-10-CM

## 2023-11-17 DIAGNOSIS — H10.11 SEASONAL AND PERENNIAL ALLERGIC RHINOCONJUNCTIVITIS OF RIGHT EYE: ICD-10-CM

## 2023-11-17 DIAGNOSIS — M25.561 MEDIAL KNEE PAIN, RIGHT: ICD-10-CM

## 2023-11-17 DIAGNOSIS — Z79.01 LONG TERM (CURRENT) USE OF ANTICOAGULANTS: ICD-10-CM

## 2023-11-17 DIAGNOSIS — Z86.711 HISTORY OF PULMONARY EMBOLISM: ICD-10-CM

## 2023-11-17 DIAGNOSIS — M25.561 MEDIAL KNEE PAIN, RIGHT: Primary | ICD-10-CM

## 2023-11-17 DIAGNOSIS — J30.2 SEASONAL AND PERENNIAL ALLERGIC RHINOCONJUNCTIVITIS OF RIGHT EYE: ICD-10-CM

## 2023-11-17 DIAGNOSIS — I26.99 PULMONARY EMBOLISM WITH INFARCTION (H): Primary | ICD-10-CM

## 2023-11-17 DIAGNOSIS — I26.99 PULMONARY EMBOLISM WITH INFARCTION (H): ICD-10-CM

## 2023-11-17 DIAGNOSIS — H90.3 BILATERAL SENSORINEURAL HEARING LOSS: ICD-10-CM

## 2023-11-17 LAB — INR BLD: 2.3 (ref 0.9–1.1)

## 2023-11-17 PROCEDURE — 73562 X-RAY EXAM OF KNEE 3: CPT | Mod: TC | Performed by: RADIOLOGY

## 2023-11-17 PROCEDURE — 36416 COLLJ CAPILLARY BLOOD SPEC: CPT | Performed by: FAMILY MEDICINE

## 2023-11-17 PROCEDURE — 99214 OFFICE O/P EST MOD 30 MIN: CPT | Performed by: FAMILY MEDICINE

## 2023-11-17 PROCEDURE — 85610 PROTHROMBIN TIME: CPT | Performed by: FAMILY MEDICINE

## 2023-11-17 RX ORDER — AZELASTINE 1 MG/ML
1 SPRAY, METERED NASAL 2 TIMES DAILY
Qty: 30 ML | Refills: 1 | Status: ON HOLD | OUTPATIENT
Start: 2023-11-17 | End: 2024-05-07

## 2023-11-17 RX ORDER — RESPIRATORY SYNCYTIAL VIRUS VACCINE 120MCG/0.5
0.5 KIT INTRAMUSCULAR ONCE
Qty: 1 EACH | Refills: 0 | Status: CANCELLED | OUTPATIENT
Start: 2023-11-17 | End: 2023-11-17

## 2023-11-17 ASSESSMENT — PATIENT HEALTH QUESTIONNAIRE - PHQ9: SUM OF ALL RESPONSES TO PHQ QUESTIONS 1-9: 15

## 2023-11-17 NOTE — ASSESSMENT & PLAN NOTE
No obvious injury.  X-ray that was quite reassuring (radiologist read pending).  Her symptoms roughly correspond to when she fell out of her scooter and lacerated her left elbow.  I think her exam is most consistent with anserine bursitis.  Discussed trial of physical therapy or referral to specialty.  Her preference is referral.  [facility]  [place] location selected given limited mobility and transportation.  This is the local option given where she lives.

## 2023-11-17 NOTE — PROGRESS NOTES
ANTICOAGULATION MANAGEMENT     Valentina Olmedo 63 year old female is on warfarin with therapeutic INR result. (Goal INR 2.0-3.0)    Recent labs: (last 7 days)     11/17/23  0930   INR 2.3*       ASSESSMENT     Source(s): Chart Review  I consulted with Paula Beasley, who advised that pt stay on same maintenance dose, but recheck early next week. I called Carla , [facility] [phone], and left a VM asking for a call back to let us know when they will be seeing patient again. When this is done, pt should be called back with dosing and recheck date.          PLAN     Unable to reach [facility] today.    VM left asking for a call back. Need to know when they are able to check pt's INR next week (preferably early in the week)     Follow up required to confirm warfarin dose taken and assess for changes    Tosin Singh RN  Anticoagulation Clinic  11/17/2023

## 2023-11-17 NOTE — COMMUNITY RESOURCES LIST (ENGLISH)
11/17/2023   [facility]  N/A  For questions about this resource list or additional care needs, please contact your primary care clinic or care manager.  Phone: [phone]   Email: N/A   Address: [address] [place] [address]   Hours: N/A        Financial Stability       Utility payment assistance  1  [place] [facility] Distance: 1.34 miles      [address] [place] [address]  Language: English, Slovenian  Hours: Mon 9:30 AM - 11:30 AM , Tue 1:30 PM - 7:30 PM , Thu 9:00 AM - 7:00 PM , Fri 9:30 AM - 11:30 AM   Phone: (653) 161-7265 Email: info@[employer] Website: http://Patientco.iCare Intelligence/     2  [place] [employer] [facility] [place]'s Telephone Assistance Plan (TAP) and Federal Lifeline and Affordable Connectivity Program (ACP) Distance: 8.75 miles      Phone/Virtual   [address] [place] [address]  Language: English  Fees: Free   Phone: (332) 146-6268 Email: consumer.puc@[facility]. Website: https://[place].gov/puc/consumers/telephone/          Food and Nutrition       Food pantry  3  [facility] Distance: 1.24 miles      In-Person, Pickup   [address] [place] [address]  Language: English  Hours: Mon - Thu 9:00 AM - 10:00 AM  Fees: Free   Phone: (689) 380-9468 Email: communication@Fototwics.org Website: http://www.[facility].org     4  [place] [facility] - Food Shelf Distance: 1.34 miles      [facility]   [address] [place] [address]  Language: English, Slovenian  Hours: Mon 9:30 AM - 11:30 AM , Wed 9:30 AM - 11:30 AM , Thu 1:30 PM - 6:30 PM , Fri 9:30 AM - 11:30 AM  Fees: Free   Phone: (798) 457-2620 Email: info@Patientco.iCare Intelligence Website: http://Patientco.org/     SNAP application assistance  5  [facility] - Economic Support [facility] Distance: 0.33 miles      In-Person, Phone/Virtual   [address], [place] [address]  Language: English  Hours: Mon - Fri  8:00 AM - 4:30 PM  Fees: Free   Phone: (215) 992-9760 Email: valentinaplacido@[facility]. Website: https://www.[facility]./787/Economic-Support     [address] [place] [address] Services - Economic Support [facility] Distance: 10.21 miles      In-Person, Phone/Virtual   [address] [place] [address]  Language: English  Hours: Mon - Fri 8:00 AM - 4:30 PM  Fees: Free   Phone: (698) 737-2753 Email: angelcomcharlyellecatie@[facility] Website: https://www.coNokoriProvidence [facility]./787/Economic-Support     Soup kitchen or free meals  7  [facility] Outreach Meal for Everyone (HOME) Distance: 4.43 miles      [address] [address]  Language: English  Hours: Thu 5:30 PM - 6:30 PM  Fees: Free   Phone: (388) 545-3281 Email: lon@[employer] Website: http://[employer]/     8  [facility] - Family half-way - Thursday Night Community Meal Distance: 7.16 miles      In-Person   [address] [place] [address]  Language: English, Greek  Hours: Thu 6:00 PM - 7:00 PM  Fees: Free   Phone: (796) 746-5962 Email: center@saintandrews.Ring[employer] Technologies Website: https://www.saintCape Fear Valley Hoke HospitalTerraSpark Geosciencess.org          Hotlines and Helplines       Hotline - Housing crisis  9  [facility] Distance: 22.87 miles      Phone/Virtual   [address] [place] [address]  Language: English  Hours: Mon - Sun Open 24 Hours   Phone: (177) 932-5859 Email: communications@oscs-mn.org Website: https://oscs-mn.org/oursaviourshousing/     10  [facility] Distance: 24.45 miles      Phone/Virtual   [address] [place] [address]  Language: English  Hours: Mon - Sun Open 24 Hours   Phone: (182) 711-7349 Email: info@[facility].org Website: http://www.[employer].org          Housing       Coordinated Entry access point  11  [facility] Distance: 15.62 miles      In-Person, Phone/Virtual    [address] [place] [address]  Language: English, Icelandic  Hours: Mon - Fri 8:30 AM - 4:30 PM  Fees: Free   Phone: (759) 787-5191 Email: info@[facility].org Website: https://www.[facility].org/locations/[facility]/     12  [facility]  [facility] Distance: 22.28 miles      Phone/Virtual   [address] [place] [address]  Language: English  Hours: Mon - Fri 8:30 AM - 12:00 PM , Mon - Fri 1:00 PM - 4:00 PM  Fees: Free   Phone: (315) 245-6229 Ext.2 Email: pierce@[facility] for ChildrenMedCity News.org Website: https://www.[facility] Centerusa.org/usn/     Drop-in center or day shelter  13  [facility] Distance: 14.09 miles      In-Person   [address] [place] [address]  Language: English  Hours: Mon - Fri 9:00 AM - 4:00 PM  Fees: Free   Phone: (933) 893-8692 Email: frontyamileth@Tot[employer]inzaMetrics Website: http://Totango.GinzaMetrics     14  [facility] Distance: 22.81 miles      In-Person   [address] [place] [address]  Language: English, Maltese, Icelandic  Hours: Mon - Sat 7:00 AM - 3:00 PM  Fees: Free, Self Pay   Phone: (135) 833-7265 Email: info@[employer]ips[employer].org Website: https://www.Clipsure.org/locations/opportunity-center/     Housing search assistance  15  [facility] [place] - Homeless Resources and Housing Information Distance: 0.33 miles      In-Person, Phone/Virtual   [address] [place] [address]  Language: English  Hours: Mon - Fri 8:00 AM - 4:30 PM  Fees: Free   Phone: (385) 864-4479 Email: geetha@[facility]. Website: https://www.[facility].us/469/Community-Services     16  [facility] Development Agency - Housing Resources Distance: 10.41 miles      In-Person, Phone/Virtual   [address] [place] [address]  Language: English  Hours: Mon - Fri 8:00 AM - 4:30 PM  Fees: Free   Phone: (247)  219-7012 Email: office@washingtoncoDarwin Marketingda.org Website: http://washingtonChristian HospitalTrackTik.org     Shelter for families  17  [facility] Distance: 12.57 miles      In-Person   [address] [facility] [place] [address]  Language: English  Hours: Mon - Fri 3:00 PM - 9:00 AM , Sat - Sun Open 24 Hours  Fees: Free   Phone: (796) 846-1775 Ext.1 Website: https://www.saintandrews.Bulzi Media/2020/07/03/emergency-family-shelter/     18  [facility] Distance: 13.97 miles      In-Person   [address] [place] [address]  Language: English  Hours: Mon - Sun Open 24 Hours  Fees: Free, Self Pay   Phone: (964) 379-6550 Email: Nabila@[facility] Website: http://www.[facility] HospitalMillion-2-1.Bulzi Media/     Shelter for individuals  19  [facility] Distance: 13.97 miles      In-Person   [address] [place] [address]  Language: English  Hours: Mon - Sun Open 24 Hours  Fees: Free   Phone: (646) 515-8905 Email: Nabila@[facility] Website: http://www.[facility].org/     20  [facility] [place] [facility] [place] [facility] Distance: 15.68 miles      In-Person   [address] [place] [address]  Language: English  Hours: Mon - Sun 5:00 PM - 10:00 AM  Fees: Free, Self Pay   Phone: (906) 460-8193 Email: info@[employer] Website: https://www.[employer].org/locations/[facility]-saint-paul/          Important Numbers & Websites       Emergency Services   911  City Services   311  Poison Control   (968) 470-9256  Suicide Prevention Lifeline   (437) 468-6290 (TALK)  Child Abuse Hotline   (925) 407-8483 (4-A-Child)  Sexual Assault Hotline   (350) 631-7964 (HOPE)  National Runaway Safeline   (279) 930-9848 (RUNAWAY)  All-Options Talkline   (990) 107-5248  Substance Abuse Referral   (362) 823-9526 (HELP)

## 2023-11-17 NOTE — ASSESSMENT & PLAN NOTE
Patient complaint of hearing loss.  Reviewed the audiology report from earlier this year.  I suspect there is some component of eustachian tube dysfunction.  Azelastine and fluticasone recommended.  The patient is also on fexofenadine.

## 2023-11-17 NOTE — PROGRESS NOTES
"  Assessment & Plan   Problem List Items Addressed This Visit       Bilateral sensorineural hearing loss     Patient complaint of hearing loss.  Reviewed the audiology report from earlier this year.  I suspect there is some component of eustachian tube dysfunction.  Azelastine and fluticasone recommended.  The patient is also on fexofenadine.         History of pulmonary embolism    Medial knee pain, right - Primary     No obvious injury.  X-ray that was quite reassuring (radiologist read pending).  Her symptoms roughly correspond to when she fell out of her scooter and lacerated her left elbow.  I think her exam is most consistent with anserine bursitis.  Discussed trial of physical therapy or referral to specialty.  Her preference is referral.  I [facility]  [place] location selected given limited mobility and transportation.  This is the local option given where she lives.         Relevant Orders    XR Knee Right 3 Views (Completed)    Orthopedic  Referral    Pulmonary embolism with infarction (H)    Seasonal and perennial allergic rhinoconjunctivitis of right eye     Trial of a mast cell medication.  Azelastine sent to pharmacy.  Continue fluticasone.         Relevant Medications    azelastine (ASTELIN) 0.1 % nasal spray      Depression Screening Follow Up        11/17/2023     8:34 AM   PHQ   PHQ-9 Total Score 15   Q9: Thoughts of better off dead/self-harm past 2 weeks Nearly every day     Jose Maria Morin MD  [facility]    Magda Montgomery is a 63 year old, presenting for the following health issues:  RECHECK (Follow-up on Osteoporosis and Need INR check today)        11/17/2023     8:32 AM   Additional Questions   Roomed by xl   Accompanied by        Right knee pain:   - anterior knee pain for the past 2 months   - \"never goes away.'   - no injury   - she feels this when she is weight " "bearing   - she feel out of her scooter a couple of months ago.      INR:   - needs INR measurement today.  Home measurements have been low.  She had an injection this week.  \"It helps somewhat.\"   She has hand pain.     Hearing:    - no pain   - history of wax   - she had audiolog evaluation in March.  Moderate hearing loss.   - symptoms can come and go.  \"Sometimes it can bother me.\"   - she has sinus congestion.  She has to blow her nose.   -she does flonase.    - allergy triggers: note sure        History of Present Illness       Reason for visit:  Osteoporosis    She eats 0-1 servings of fruits and vegetables daily.She consumes 2 sweetened beverage(s) daily.She exercises with enough effort to increase her heart rate 9 or less minutes per day.  She exercises with enough effort to increase her heart rate 7 days per week.   She is taking medications regularly.       Review of Systems         Objective    /85 (BP Location: Right arm, Patient Position: Sitting, Cuff Size: Adult Large)   Pulse 96   Temp 97.9  F (36.6  C) (Oral)   Resp 16   Ht 1.689 m (5' 6.5\")   Wt (!) 158.7 kg (349 lb 12.8 oz)   LMP  (LMP Unknown)   SpO2 93%   BMI 55.61 kg/m    Body mass index is 55.61 kg/m .  Physical Exam   General: No acute distress, in a wheelchair.  - MSK: Tenderness to palpation over the medial superior aspect of the lower extremity medial to the tibial tuberosity.  No swelling.  Other bony landmarks are nontender.    Right knee x-ray: Some decrease of joint space on the medial side.  What appears to be a chronic osteophyte positioned medial to the knee.  No fracture.                  "

## 2023-11-17 NOTE — COMMUNITY RESOURCES LIST (ENGLISH)
11/17/2023   [facility] Clin[facility]  N/A  For additional resource needs, please contact your health insurance member services or your primary care team.  Phone: [phone]   Email: N/A   Address: [facility] [place] [address]   Hours: N/A        Financial Stability       Utility payment assistance  1  [place] Public [employer] - [place]'s Telephone Assistance Plan (TAP) and Federal Lifeline and Affordable Connectivity Program (ACP) Distance: 8.75 miles      Phone/Virtual   [address] [place] [address]  Language: English  Fees: Free   Phone: (325) 457-2964 Email: consumer.puc@[facility].mn. Website: https://[place].gov/puc/consumers/telephone/     2  [place] [employer] - Energy and Utilities Distance: 15.2 miles      In-Person, Phone/Virtual   [address] [place] [address]  Language: English  Hours: Mon - Fri 8:30 AM - 4:30 PM  Fees: Free   Phone: (990) 712-6050 Website: https://[place].gov/MerchMee/energy/consumer-assistance/energy-assistance-program/          Food and Nutrition       Food pantry  3  [place] [facility] Food Shelf Distance: 1.24 miles      In-Person, Pickup   [address] [place] [address]  Language: English  Hours: Mon - Thu 9:00 AM - 10:00 AM  Fees: Free   Phone: (136) 899-2385 Email: communication@[employer].org Website: http://www.[facility].org     4  [facility] Food Shelf Distance: 1.34 miles      Pickup   [address] [place] [address]  Language: English, Turkish  Hours: Mon 9:30 AM - 11:30 AM , Wed 9:30 AM - 11:30 AM , Thu 1:30 PM - 6:30 PM , Fri 9:30 AM - 11:30 AM  Fees: Free   Phone: (999) 177-6142 Email: info@[facility].org Website: http://[facility].org/     SNAP application assistance  5  [employer] [place] Distance: 15.47 miles      Phone/Virtual   [address] [place] [address]  Language: English, Hmong, Macanese, Dutch, Turkish  Hours: Mon - Fri 8:30 AM -  4:30 PM  Fees: Free   Phone: (851) 853-1313 Email: helpline@hungersoSanta Rosa Consultingions.org Website: https://www.hungersolutions.org/programs/[place]-food-helpline/     6  [facility] Services - Economic Support - [place] Distance: 0.33 miles      In-Person, Phone/Virtual   [address] [place] [address]  Language: English  Hours: Mon - Fri 8:00 AM - 4:30 PM  Fees: Free   Phone: (623) 608-7136 Email: geetha@[facility]. Website: https://www.[facility]./787/Economic-Support     Soup kitchen or free meals  7  [facility] Outreach Meal for Everyone (HOME) Distance: 4.43 miles      [place] [address] [place] [address]  Language: English  Hours: Thu 5:30 PM - 6:30 PM  Fees: Free   Phone: (634) 305-6931 Email: lon@[employer] Website: http://[employer]/     8  [facility] Family [facility] - Thursday Night Community Meal Distance: 7.16 miles      In-Person   [address] [place] [address]  Language: English, English  Hours: Thu 6:00 PM - 7:00 PM  Fees: Free   Phone: (967) 692-4510 Email: center@saintandrews.PlayPhilo.Com Website: https://www.saintandCriticalBluesCrowdcast          Hotlines and Helplines       Hotline - Housing crisis  9  [facility] Distance: 22.87 miles      Phone/Virtual   [address] [place] [address]  Language: English  Hours: Mon - Sun Open 24 Hours   Phone: (329) 271-5598 Email: communications@oscs-mn.org Website: https://oscs-mn.org/oursaviourshousing/     10  [facility] Distance: 24.45 miles      Phone/Virtual   [address] [place] [address]  Language: English  Hours: Mon - Sun Open 24 Hours   Phone: (151) 665-1401 Email: info@[facility].org Website: http://www.[facility].org          Housing       Coordinated Entry access point  11  [facility] Distance: 15.62 miles      In-Person, Phone/Virtual   [address]  [place] [address]  Language: English, Ethiopian  Hours: Mon - Fri 8:30 AM - 4:30 PM  Fees: Free   Phone: (981) 887-4483 Email: info@[facility].org Website: https://www.[facility].org/locations/[facility]/     12  [facility] Distance: 22.28 miles      Phone/Virtual   [address] [facility] [address] [place] [address]  Language: English  Hours: Mon - Fri 8:30 AM - 12:00 PM , Mon - Fri 1:00 PM - 4:00 PM  Fees: Free   Phone: (676) 721-8786 Ext.2 Email: pierce@[facility] HospitalYakaz.5by Website: https://www.[facility].org/usn/     Drop-in center or day shelter  13  [facility] Distance: 14.09 miles      In-Person   [address] [place] [address]  Language: English  Hours: Mon - Fri 9:00 AM - 4:00 PM  Fees: Free   Phone: (847) 286-2470 Email: frontbarryk@"[employer]by Website: http://"[employer].".5by     14  [facility] Distance: 22.81 miles      In-Person   [address] [place] [address]  Language: English, Central African, Ethiopian  Hours: Mon - Sat 7:00 AM - 3:00 PM  Fees: Free, Self Pay   Phone: (396) 812-6996 Email: info@[employer]by Website: https://www.[employer].org/locations/opportunity-center/     Housing search assistance  15  Affordable Housing Online - https://"[employer]/ Distance: 22.78 miles      Phone/Virtual   [address] [place] [address]  Language: English  Hours: Mon - Sun Open 24 Hours   Email: info@[employer]Group Website: https://"[employer]."     16  Housing[employer] - Online housing search assistance Distance: 23.88 miles      Phone/Virtual   [address] [address]  Language: English, Hmong, Central African, Ethiopian  Hours: Mon - Sun Open 24 Hours   Phone: (113) 598-7681 Email: info@[facility] Hospit[facility].org Website: http://www.[facility] Hospit[facility].org/     Shelter for families  17  [facility] Distance:  12.57 miles      In-Person   [address] [place] [address]  Language: English  Hours: Mon - Fri 3:00 PM - 9:00 AM , Sat - Sun Open 24 Hours  Fees: Free   Phone: (680) 761-7840 Ext.1 Website: https://www.saintandrews.Ecal/2020/07/03/emergency-family-shelter/     18  [facility] [place] Distance: 13.97 miles      In-Person   [address] [place] [address]  Language: English  Hours: Mon - Sun Open 24 Hours  Fees: Free, Self Pay   Phone: (792) 461-4089 Email: Nabila@[facility] Website: http://www.[facility] HospitalPLUQ.Ecal/     Shelter for individuals  19  [facility] [place] Distance: 13.97 miles      In-Person   [address] [place] [address]  Language: English  Hours: Mon - Sun Open 24 Hours  Fees: Free   Phone: (108) 455-1522 Email: Nabila@[facility] Website: http://www.[employer].Ecal/     20  [facility] [place] [facility] - [facility] [place] [facility] Distance: 15.68 miles      In-Person   [address] [place] [address]  Language: English  Hours: Mon - Sun 5:00 PM - 10:00 AM  Fees: Free, Self Pay   Phone: (714) 907-9545 Email: info@Cerelink.org Website: https://www.[facility].org/locations/[facility]-saint-paul/          Important Numbers & Websites       [address].org  Poison Control   (150) 572-2033 [place]poison.org  Suicide and Crisis Lifeline   988 8[facility] Hospitalline.org  Childhelp National Child Abuse Hotline   136.221.8097 Childhelphotline.org  National Sexual Assault Hotline   (674) 409-9611 (HOPE) Rainn.org  National Runaway Safeline   (134) 853-7629 (RUNAWAY) [facility] Hospitalrunaway.org  Pregnancy & Postpartum Support [place]   Call/text 656-086-8791 Ppsupportmn.org  Substance Abuse National Helpline ([facility]   647-922-HELP (9987) Findtreatment.gov  Emergency Services   917

## 2023-11-17 NOTE — TELEPHONE ENCOUNTER
Central Prior Authorization Team   Phone: [phone]    [place] Initiation    Medication: pregabalin (LYRICA) 25 MG capsule  Insurance Company: [employer] ([facility]) - Phone [phone] Fax [phone]  [place] [facility] [place] [address] [place] [address], [address]  Filling Pharmacy Phone: [phone]  Filling Pharmacy Fax: [phone]  Start Date: 11/17/2023

## 2023-11-17 NOTE — PROGRESS NOTES
ANTICOAGULATION MANAGEMENT     Valentina Olmedo 63 year old female is on warfarin with therapeutic INR result. (Goal INR 2.0-3.0)    Recent labs: (last 7 days)     11/17/23  0930   INR 2.3*       ASSESSMENT     Source(s): Chart Review  Previous INR was Subtherapeutic  Medication, diet, health changes since last INR chart reviewed; none identified    I left a detailed voicemail with the orders reflected in flowsheet. I have also requested a call back if there have been any missed doses, concerns, illness, fever, or if there have been any changes in medications, activity level, or diet      Also talked to Carla , Melquiades, who said they will be out to see pt on Tuesday. Order given to her     MyChart also sent to pt      PLAN     Recommended plan for no diet, medication or health factor changes affecting INR     Dosing Instructions: Continue your current warfarin dose with next INR in 4 days       Summary  As of 11/17/2023      Full warfarin instructions:  10 mg every Sun, Thu; 7.5 mg all other days   Next INR check:  11/21/2023               Detailed voice message left for Valentina with dosing instructions and follow up date.   Sent myDockett message with dosing and follow up instructions    Orders given to  Homecare nurse/facility to recheck    Education provided:   Please call back if any changes to your diet, medications or how you've been taking warfarin    Plan made per [facility] anticoagulation protocol    Tosin Singh, RN  Anticoagulation Clinic  11/17/2023    _______________________________________________________________________     Anticoagulation Episode Summary       Current INR goal:  2.0-3.0   TTR:  55.3% (1 y)   Target end date:  Indefinite   Send INR reminders to:  PIPO MATHUR    Indications    History of pulmonary embolism (Resolved) [Z86.711]  Pulmonary embolism with infarction (H) [I26.99]  Long term (current) use of anticoagulants [Z79.01]  History of pulmonary embolism [Z86.711]              Comments:               Anticoagulation Care Providers       Provider Role Specialty Phone number    Promise Vanegas MD Referring Family Medicine [phone]    Donald Rooney MD Referring Internal Medicine [phone]    Jose Maria Morin MD Referring Family Medicine [phone]

## 2023-11-17 NOTE — TELEPHONE ENCOUNTER
General Call    Contacts         Type Contact Phone/Fax    11/17/2023 10:56 AM CST Phone (Incoming) Valentina Olmedo (Self) [phone] (M)          Reason for Call:  warfrin dosage    What are your questions or concerns:  would like to know how to set warfrin up INR 2.3    Date of last appointment with provider: na    Could we send this information to you in [facility] or would you prefer to receive a phone call?:   Patient would prefer a phone call   Okay to leave a detailed message?: Yes at Home number on file [phone] (home)

## 2023-11-20 ENCOUNTER — MEDICAL CORRESPONDENCE (OUTPATIENT)
Dept: HEALTH INFORMATION MANAGEMENT | Facility: CLINIC | Age: 63
End: 2023-11-20

## 2023-11-20 NOTE — TELEPHONE ENCOUNTER
Prior Authorization Approval    Authorization Effective Date: 11/17/2023  Authorization Expiration Date: 12/31/2024  Medication: pregabalin (LYRICA) 25 MG capsule - APPROVED  Approved Dose/Quantity:    Reference #:     Insurance Company: [employer] ([facility]) - Phone [phone] Fax [phone]  Expected CoPay:       CoPay Card Available:      Foundation Assistance Needed:    Which Pharmacy is filling the prescription (Not needed for infusion/clinic administered[place] [facility] [place] [address], [address]  Pharmacy Notified:  YES  Patient Notified:  YES  **Instructed pharmacy to notify patient when script is ready to /ship.**

## 2023-11-22 ENCOUNTER — ANTICOAGULATION THERAPY VISIT (OUTPATIENT)
Dept: ANTICOAGULATION | Facility: CLINIC | Age: 63
End: 2023-11-22
Payer: COMMERCIAL

## 2023-11-22 DIAGNOSIS — Z86.711 HISTORY OF PULMONARY EMBOLISM: ICD-10-CM

## 2023-11-22 DIAGNOSIS — I26.99 PULMONARY EMBOLISM WITH INFARCTION (H): Primary | ICD-10-CM

## 2023-11-22 DIAGNOSIS — Z79.01 LONG TERM (CURRENT) USE OF ANTICOAGULANTS: ICD-10-CM

## 2023-11-22 LAB — INR (EXTERNAL): 1.9 (ref 0.9–1.1)

## 2023-11-22 NOTE — PROGRESS NOTES
ANTICOAGULATION MANAGEMENT     Valentina Olmedo 63 year old female is on warfarin with subtherapeutic INR result. (Goal INR 2.0-3.0)    Recent labs: (last 7 days)     11/22/23  1300   INR 1.9*       ASSESSMENT     Source(s): Chart Review and Patient/Caregiver Call     Warfarin doses taken: Warfarin taken as instructed  Diet: No new diet changes identified  Medication/supplement changes: None noted  New illness, injury, or hospitalization: No  Signs or symptoms of bleeding or clotting: No  Previous result: Therapeutic last visit; previously outside of goal range  Additional findings: None       PLAN     Recommended plan for no diet, medication or health factor changes affecting INR     Dosing Instructions: booster dose then continue your current warfarin dose with next INR in 1 week       Summary  As of 11/22/2023      Full warfarin instructions:  11/22: 10 mg; Otherwise 10 mg every Sun, Thu; 7.5 mg all other days   Next INR check:  11/29/2023               Telephone call with [facility] home care nurse who verbalizes understanding and agrees to plan and who agrees to plan and repeated back plan correctly    Orders given to  Homecare nurse/facility to recheck    Education provided:   None required    Plan made per [facility] anticoagulation protocol    Zari Maria RN  Anticoagulation Clinic  11/22/2023    _______________________________________________________________________     Anticoagulation Episode Summary       Current INR goal:  2.0-3.0   TTR:  56.1% (1 y)   Target end date:  Indefinite   Send INR reminders to:  [facility]    Indications    History of pulmonary embolism (Resolved) [Z86.711]  Pulmonary embolism with infarction (H) [I26.99]  Long term (current) use of anticoagulants [Z79.01]  History of pulmonary embolism [Z86.711]             Comments:               Anticoagulation Care Providers       Provider Role Specialty Phone number    Promise Vanegas MD Referring Family Medicine [phone]    Toribio  Donald CALLAHAN MD Referring Internal Medicine [phone]    Jose Maria Morin MD Referring Family Medicine [phone]

## 2023-11-27 ENCOUNTER — DOCUMENTATION ONLY (OUTPATIENT)
Dept: PSYCHOLOGY | Facility: CLINIC | Age: 63
End: 2023-11-27

## 2023-11-27 ENCOUNTER — TELEPHONE (OUTPATIENT)
Dept: BEHAVIORAL HEALTH | Facility: CLINIC | Age: 63
End: 2023-11-27

## 2023-11-27 NOTE — PROGRESS NOTES
Patient had an appointment scheduled at 10 AM by video.  This writer logged into the video appointment and waited until 10:15 AM as well as reached out to patient twice by phone and was unable to connect with her.  A voice message was left providing care with the number for behavioral intake to reschedule her intake appointment if she would like.

## 2023-11-27 NOTE — TELEPHONE ENCOUNTER
Writer spoke with pt and scheduled initial TC therapy appointment on 12/01//2023 @  12:30 pm. Writer sent intake documents via [employer]. Writer will reply to referral source.Tracker completed.    Anabella Boyd  11/27/2023  139

## 2023-11-27 NOTE — TELEPHONE ENCOUNTER
----- Message from Cornelio Davis sent at 11/27/2023  1:07 PM CST -----  Transition Clinic Referral   [place]         Please Check Type of Referral Requested:       __x__THERAPY: The Transition clinic is able to schedule patients without current medical insurance; these patient will be referred to our Social Work Care Coordinator for Medical Insurance              Assistance. We are open for referral for psychotherapy. Patient is referred from:  [facility]        Referring Provider Contact Name: [facility]; Phone Number: [phone]    Reason for Transition Clinic Referral: Therapy    Next Level of Care Patient Will Be Transitioned To: [facility]  Provider(s)Conchita Smith  Location [facility]  Date/Time 3/19/2024    What Would Be Helpful from the Transition Clinic: therapy     Needs: NO    Does Patient Have Access to Technology: yes    Patient E-mail Address: [email or web address]    Current Patient Phone Number: [phone]; n/a    Clinician Gender Preference (if applicable): YES: female    Patient location preference: In person    Cornelio Davis

## 2023-11-28 ENCOUNTER — MEDICAL CORRESPONDENCE (OUTPATIENT)
Dept: HEALTH INFORMATION MANAGEMENT | Facility: CLINIC | Age: 63
End: 2023-11-28

## 2023-11-28 ENCOUNTER — ANTICOAGULATION THERAPY VISIT (OUTPATIENT)
Dept: ANTICOAGULATION | Facility: CLINIC | Age: 63
End: 2023-11-28
Payer: COMMERCIAL

## 2023-11-28 ENCOUNTER — HOSPITAL ENCOUNTER (OUTPATIENT)
Dept: CARDIAC REHAB | Facility: HOSPITAL | Age: 63
Discharge: HOME | End: 2023-11-28
Attending: INTERNAL MEDICINE
Payer: COMMERCIAL

## 2023-11-28 DIAGNOSIS — Z86.711 HISTORY OF PULMONARY EMBOLISM: ICD-10-CM

## 2023-11-28 DIAGNOSIS — I26.99 PULMONARY EMBOLISM WITH INFARCTION (H): Primary | ICD-10-CM

## 2023-11-28 DIAGNOSIS — Z79.01 LONG TERM (CURRENT) USE OF ANTICOAGULANTS: ICD-10-CM

## 2023-11-28 LAB — INR (EXTERNAL): 1.6 (ref 0.9–1.1)

## 2023-11-28 PROCEDURE — G0239 OTH RESP PROC, GROUP: HCPCS

## 2023-11-28 NOTE — PROGRESS NOTES
ANTICOAGULATION MANAGEMENT     Valentina Olmedo 63 year old female is on warfarin with subtherapeutic INR result. (Goal INR 2.0-3.0)    Recent labs: (last 7 days)     11/28/23  1445   INR 1.6*       ASSESSMENT     Source(s): Chart Review, Patient/Caregiver Call, and Home Care/Facility Nurse     Warfarin doses taken: Warfarin taken as instructed  Diet: No new diet changes identified  Medication/supplement changes: None noted  New illness, injury, or hospitalization: No  Signs or symptoms of bleeding or clotting: No  Previous result: Subtherapeutic  Additional findings: None       PLAN     Recommended plan for no diet, medication or health factor changes affecting INR     Dosing Instructions: 11/28: boost, then Increase your warfarin dose (13% change) with next INR in 7 days       Summary  As of 11/28/2023      Full warfarin instructions:  11/28: 10 mg; Otherwise 7.5 mg every Tue, Sat; 10 mg all other days   Next INR check:  12/6/2023               [facility] who agrees to plan and repeated back plan correctly    Orders given to  Homecare nurse/facility to recheck    Education provided:   Please call back if any changes to your diet, medications or how you've been taking warfarin    Plan made per [facility] anticoagulation protocol    Kristin Shah RN  Anticoagulation Clinic  11/28/2023    _______________________________________________________________________     Anticoagulation Episode Summary       Current INR goal:  2.0-3.0   TTR:  55.3% (1 y)   Target end date:  Indefinite   Send INR reminders to:  [facility]    Indications    History of pulmonary embolism (Resolved) [Z86.711]  Pulmonary embolism with infarction (H) [I26.99]  Long term (current) use of anticoagulants [Z79.01]  History of pulmonary embolism [Z86.711]             Comments:               Anticoagulation Care Providers       Provider Role Specialty Phone number    Promise Vanegas MD Referring Family Medicine  [phone]    Donald Rooney MD Referring Internal Medicine [phone]    Jose Maria Morin MD Referring Family Medicine [phone]

## 2023-11-30 ENCOUNTER — MEDICAL CORRESPONDENCE (OUTPATIENT)
Dept: HEALTH INFORMATION MANAGEMENT | Facility: CLINIC | Age: 63
End: 2023-11-30

## 2023-12-01 ENCOUNTER — TELEPHONE (OUTPATIENT)
Dept: BEHAVIORAL HEALTH | Facility: CLINIC | Age: 63
End: 2023-12-01
Payer: COMMERCIAL

## 2023-12-01 NOTE — TELEPHONE ENCOUNTER
Called patient on both numbers on file and left a voicemail on each requesting a call back to reschedule appointment. Appt scheduled for 1/5/2024, and messages to patient indicate an appt for 12/1/2023. Appt reschedule needed to switch providers and/or modality. (In[employer] message sent as well.     Eleanor Ramos  Transition Clinic Coordinator  12/01/23 11:47 AM

## 2023-12-04 ENCOUNTER — IMMUNIZATION (OUTPATIENT)
Dept: FAMILY MEDICINE | Facility: CLINIC | Age: 63
End: 2023-12-04
Payer: COMMERCIAL

## 2023-12-04 DIAGNOSIS — Z23 ENCOUNTER FOR IMMUNIZATION: Primary | ICD-10-CM

## 2023-12-04 PROCEDURE — 90471 IMMUNIZATION ADMIN: CPT

## 2023-12-04 PROCEDURE — 90750 HZV VACC RECOMBINANT IM: CPT

## 2023-12-04 PROCEDURE — 90480 ADMN SARSCOV2 VAC 1/ONLY CMP: CPT

## 2023-12-04 PROCEDURE — 91320 SARSCV2 VAC 30MCG TRS-SUC IM: CPT

## 2023-12-04 PROCEDURE — 99207 PR NO CHARGE NURSE ONLY: CPT

## 2023-12-04 NOTE — PROGRESS NOTES
Prior to immunization administration, verified patients identity using patient s name and date of birth. Please see Immunization Activity for additional information.     Screening Questionnaire for Adult Immunization    Are you sick today?   No   Do you have allergies to medications, food, a vaccine component or latex?   No   Have you ever had a serious reaction after receiving a vaccination?   No   Do you have a long-term health problem with heart, lung, kidney, or metabolic disease (e.g., diabetes), asthma, a blood disorder, no spleen, complement component deficiency, a cochlear implant, or a spinal fluid leak?  Are you on long-term aspirin therapy?   No   Do you have cancer, leukemia, HIV/AIDS, or any other immune system problem?   No   Do you have a parent, brother, or sister with an immune system problem?   No   In the past 3 months, have you taken medications that affect  your immune system, such as prednisone, other steroids, or anticancer drugs; drugs for the treatment of rheumatoid arthritis, Crohn s disease, or psoriasis; or have you had radiation treatments?   No   Have you had a seizure, or a brain or other nervous system problem?   No   During the past year, have you received a transfusion of blood or blood    products, or been given immune (gamma) globulin or antiviral drug?   No   For women: Are you pregnant or is there a chance you could become       pregnant during the next month?   No   Have you received any vaccinations in the past 4 weeks?   No     Immunization questionnaire answers were all negative.    I have reviewed the following standing orders:   This patient is due and qualifies for the Covid-19 vaccine.     Click here for COVID-19 Standing Order    I have reviewed the vaccines inclusion and exclusion criteria; No concerns regarding eligibility.     This patient is due and qualifies for the Zoster vaccine.    Click here for Zoster Standing Order    I have reviewed the vaccines inclusion  and exclusion criteria; No concerns regarding eligibility.     Patient instructed to remain in clinic for 15 minutes afterwards, and to report any adverse reactions.     Screening performed by Duy Jeffrey RN on 12/4/2023 at 12:56 PM.

## 2023-12-06 ENCOUNTER — ANTICOAGULATION THERAPY VISIT (OUTPATIENT)
Dept: ANTICOAGULATION | Facility: CLINIC | Age: 63
End: 2023-12-06
Payer: COMMERCIAL

## 2023-12-06 LAB — INR (EXTERNAL): 1.8 (ref 0.9–1.1)

## 2023-12-06 NOTE — PROGRESS NOTES
ANTICOAGULATION MANAGEMENT     Valentina Olmedo 63 year old female is on warfarin with subtherapeutic INR result. (Goal INR 2.0-3.0)    Recent labs: (last 7 days)     12/06/23  1200   INR 1.8*       ASSESSMENT     Source(s): Chart Review and Home Care/Facility Nurse     Warfarin doses taken: Warfarin taken as instructed  Diet: No new diet changes identified  Medication/supplement changes: None noted  New illness, injury, or hospitalization: No  Signs or symptoms of bleeding or clotting: No  Previous result: Subtherapeutic  Additional findings: HCN visits weekly. HCN reports that pt is doing well. She recently had the shingles shot and COVID shot so she has been taking tylenol PRN lately       PLAN     Recommended plan for ongoing change(s) affecting INR     Dosing Instructions: Increase your warfarin dose (7.7% change) with next INR in 1 week       Summary  As of 12/6/2023      Full warfarin instructions:  10 mg every day   Next INR check:  12/13/2023               Telephone call with Fanny at [phone] home care nurse who agrees to plan and repeated back plan correctly    Orders given to  Homecare nurse/facility to recheck    Education provided:   Contact [phone]  with any changes, questions or concerns.     Plan made per [facility] anticoagulation protocol    Savita Calle RN  Anticoagulation Clinic  12/6/2023    _______________________________________________________________________     Anticoagulation Episode Summary       Current INR goal:  2.0-3.0   TTR:  53.1% (1 y)   Target end date:  Indefinite   Send INR reminders to:  [facility]    Indications    History of pulmonary embolism (Resolved) [Z86.711]  Pulmonary embolism with infarction (H) [I26.99]  Long term (current) use of anticoagulants [Z79.01]  History of pulmonary embolism [Z86.711]             Comments:               Anticoagulation Care Providers       Provider Role Specialty Phone number    Promise Vanegas MD Referring Family Medicine  [phone]    Donald Rooney MD Referring Internal Medicine [phone]    Jose Maria Morin MD Referring Family Medicine [phone]

## 2023-12-07 ENCOUNTER — TELEPHONE (OUTPATIENT)
Dept: FAMILY MEDICINE | Facility: CLINIC | Age: 63
End: 2023-12-07
Payer: COMMERCIAL

## 2023-12-07 ENCOUNTER — MEDICAL CORRESPONDENCE (OUTPATIENT)
Dept: CARDIAC REHAB | Facility: HOSPITAL | Age: 63
End: 2023-12-07
Payer: COMMERCIAL

## 2023-12-07 NOTE — TELEPHONE ENCOUNTER
Home Care is calling regarding an established patient with [facility].       Requesting orders from: Jose Maria Morin  Provider is following patient: Yes  Is this a 60-day recertification request?  Yes    Orders Requested    Skilled Nursing  Request for recertification   Frequency:  Skilled Nursing: One visit a week for 8 weeks plus 3 PRN visits for medication management and INR.       Verbal orders given.  Home Care will send orders for provider to sign.  Confirmed ok to leave a detailed message with call back.  Contact information confirmed and updated as needed.    Chen Paz RN

## 2023-12-07 NOTE — TELEPHONE ENCOUNTER
Home Health Care    Reason for call:  Verbal order    Orders are needed for this patient.  Skilled Nursing: One visit a week for 8 weeks plus 3 PRN visits for medication management and INR.     Pt Provider: Dr. Morin    Phone Number Homecare Nurse can be reached at: [phone]    Can we leave a detailed message on this number? YES

## 2023-12-11 ENCOUNTER — MEDICAL CORRESPONDENCE (OUTPATIENT)
Dept: HEALTH INFORMATION MANAGEMENT | Facility: CLINIC | Age: 63
End: 2023-12-11

## 2023-12-12 ENCOUNTER — OFFICE VISIT (OUTPATIENT)
Dept: PSYCHIATRY | Facility: CLINIC | Age: 63
End: 2023-12-12
Payer: COMMERCIAL

## 2023-12-12 VITALS — HEART RATE: 94 BPM | DIASTOLIC BLOOD PRESSURE: 64 MMHG | SYSTOLIC BLOOD PRESSURE: 114 MMHG

## 2023-12-12 DIAGNOSIS — F25.9 SCHIZOAFFECTIVE DISORDER, UNSPECIFIED TYPE (H): Primary | ICD-10-CM

## 2023-12-12 DIAGNOSIS — F43.10 PTSD (POST-TRAUMATIC STRESS DISORDER): ICD-10-CM

## 2023-12-12 PROCEDURE — 99215 OFFICE O/P EST HI 40 MIN: CPT | Performed by: PSYCHIATRY & NEUROLOGY

## 2023-12-12 RX ORDER — QUETIAPINE FUMARATE 50 MG/1
50 TABLET, FILM COATED ORAL
Qty: 30 TABLET | Refills: 4 | Status: SHIPPED | OUTPATIENT
Start: 2023-12-12 | End: 2024-02-27

## 2023-12-12 RX ORDER — QUETIAPINE FUMARATE 50 MG/1
50 TABLET, FILM COATED ORAL AT BEDTIME
Qty: 30 TABLET | Refills: 4 | Status: SHIPPED | OUTPATIENT
Start: 2023-12-12 | End: 2023-12-12

## 2023-12-12 RX ORDER — ARIPIPRAZOLE 10 MG/1
10 TABLET ORAL DAILY
Qty: 30 TABLET | Refills: 4 | Status: SHIPPED | OUTPATIENT
Start: 2023-12-12 | End: 2023-12-12

## 2023-12-12 RX ORDER — ARIPIPRAZOLE 10 MG/1
10 TABLET ORAL EVERY MORNING
Qty: 30 TABLET | Refills: 4 | Status: SHIPPED | OUTPATIENT
Start: 2023-12-12 | End: 2024-04-18

## 2023-12-12 NOTE — PROGRESS NOTES
"  Mental Health and Collaborative Care Psychiatry Service Rooming Note      Most pressing mental health concern at this time: \" Doing Fair\". Get more depressed around this time, I think a lot about suicide but I do not act on it. Pt reports isolating herself and not getting up of bed. C/o having an increase in nightmare, PTSD is acting up.       Any new physical health conditions or diagnoses affecting you that we should be aware of: none      Side effects related to medications patient would like to discuss with the provider:  Yes- doesn't feel her sleeping meds are working      Are you taking your medications as prescribed?  yes  If not, why? NA      Do you need refills of any of the medications?  no  If so, which ones? NA      Are you taking any recreational substances? denies      Is there any chance you are pregnant? No  Do you use birth control? Hysterectomy      Provider notified  N/A        Lorraine Villegas LPN  December 12, 2023  10:15 AM      "

## 2023-12-12 NOTE — PATIENT INSTRUCTIONS
TAKE ABILIFY AT 8 AM    TAKE SEROQUEL AT 2 PM      [facility]  [email or web address]  [phone]

## 2023-12-12 NOTE — PROGRESS NOTES
"      Medical Decision Makin. Schizoaffective disorder, depressive type (H)  Increase mood lability, agitation.  Yells at .  Seroquel 25 mg bid started in Oct- not helping.  Pt does not want to increase dose due to past weight gain.  Past mood stabilizers:  -Seroquel 100 mg nightly stopped in July due to weight gain.   -Tegretol 400 mg 3 times daily. Dose decreased by neurology >1 year ago due to falls.  -Lamictal 200mg DC'd due to dizzyness.    Plan:   Start Abilify 10mg q am.   Change Seroquel 50 mg 2 PM.  Cymbalta 120 mg daily.  melatonin 3 mg nightly.      2. Seasonal depression   This time a year is particularly difficult for pt. \"We used to get together with about 20 family members for the holidays, no one is left now except me and René\".  Plan: Encouraged to participate in [facility] activities to provide social support.      3. Borderline personality disorder  Previous therapy with Pattie Barney [facility].  Unknown if pt actually completed DBT.   Note excerpt: \"Patient is set up with outpatient psychotherapy at [facility] and is also enrolled in a DBT program [facility].     Jelena Benjamin, [facility], 2016.    Plan: Discussed referral to professional rehab consultants vs. IOP vs. DBT. Pt agreed to [facility] referral. Called them during today's visit. [facility] will check on whether pt can return and call her back.         4. High risk medication use  Risks and benefits of medication discussed (ie metabolic s/e's, TD, CV risk).  Metabolic labs done 2023.  Metabolic conditions treated by PCP.  No involuntary movements noted on MSE.  DISCUS score = 0 (23)  Plan: Topamax 50 mg nightly to target appetite and weight.      5. PTSD   Prazosin resumed in July.  Still has occasional nightmare.    Plan: Continue prazosin 2 mg q HS.       6. Epilepsy  Managed by Vikki Benitez MD, [facility].      7. Problems related to primary support group   René seeking his own therapist.  " "Tension in the relationship both have mental health needs. Pt irritable.    Next appt 2-3 mos.         History of Present Illness:   Valentina Olmedo is a 63 .o. female here for SCAD, bipolar type, PTSD, Neurocognitive disorder, seizure disorder here for f/u.    Last seen Oct.    Pt reports increased mood lability.    In-home services:  -[facility]  -Med set up q Monday by nurse.  -Food from [facility], [facility]    Weekly INR.    SOC: Lives with  René. Cat, \"Yosi\".   [facility] [facility] [employer] [employer].      ROS:  Denies SI/manic/sx.  See HPI, otherwise negative.      Current Outpatient Medications:     ARIPiprazole (ABILIFY) 10 MG tablet, Take 1 tablet (10 mg) by mouth every morning, Disp: 30 tablet, Rfl: 4    melatonin 3 MG tablet, Take 1 tablet (3 mg) by mouth nightly as needed for sleep, Disp: 90 tablet, Rfl: 1    QUEtiapine (SEROQUEL) 50 MG tablet, Take 1 tablet (50 mg) by mouth daily at 2 pm For agitation. OK to change to as needed., Disp: 30 tablet, Rfl: 4    rosuvastatin (CRESTOR) 20 MG tablet, TAKE 1 TABLET BY MOUTH DAILY, Disp: 28 tablet, Rfl: 12    albuterol (PROAIR HFA) 108 (90 Base) MCG/ACT inhaler, Inhale 2 puffs into the lungs every 4 hours as needed for shortness of breath / dyspnea or wheezing, Disp: 8 g, Rfl: 4    azelastine (ASTELIN) 0.1 % nasal spray, Spray 1 spray into both nostrils 2 times daily, Disp: 30 mL, Rfl: 1    azelastine (OPTIVAR) 0.05 % ophthalmic solution, Apply 1 drop to eye 2 times daily, Disp: 10 mL, Rfl: 1    Brivaracetam (BRIVIACT) 100 MG tablet, Take 150 mg by mouth 2 times daily , Disp: , Rfl:     Calcium Citrate-Vitamin D (CALCIUM + D PO), Take 1 tablet by mouth daily , Disp: , Rfl:     carBAMazepine (TEGRETOL) 200 MG tablet, 200mg in the  at lunch and 400mg at HS, Disp: , Rfl:     Cyanocobalamin (VITAMIN B12) 1000 MCG TBCR, Take 1 tablet by mouth, Disp: , Rfl:     denosumab (PROLIA) 60 MG/ML SOSY injection, Inject 60 mg Subcutaneous once Per pt report, " Disp: , Rfl:     DULoxetine (CYMBALTA) 60 MG capsule, Take 2 capsules (120 mg) by mouth daily, Disp: 180 capsule, Rfl: 3    EPINEPHrine (ADRENACLICK JR) 0.15 MG/0.15ML injection 2-pack, Inject 0.15 mLs (0.15 mg) into the muscle as needed for anaphylaxis, Disp: 2 each, Rfl: 1    EPINEPHrine (ANY BX GENERIC EQUIV) 0.3 MG/0.3ML injection 2-pack, Inject 0.3 mLs (0.3 mg) into the muscle as needed for anaphylaxis, Disp: 2 each, Rfl: 1    esomeprazole (NEXIUM) 40 MG DR capsule, TAKE 1 CAPSULE BY MOUTH EVERY MORNING BEFORE BREAKFAST (TAKE 30-60 MINUTES BEFORE EATING), Disp: 30 capsule, Rfl: 4    fexofenadine (ALLEGRA) 180 MG tablet, Take 1 tablet (180 mg) by mouth daily, Disp: 90 tablet, Rfl: 3    fluticasone (FLONASE) 50 MCG/ACT nasal spray, INHALE 1 SPRAY IN EACH NOSTRIL DAILY Strength: 50 MCG/ACT, Disp: 16 g, Rfl: 11    levothyroxine (SYNTHROID/LEVOTHROID) 300 MCG tablet, Take 1 tablet (300 mcg) by mouth daily, Disp: 90 tablet, Rfl: 3    losartan (COZAAR) 25 MG tablet, Take 0.5 tablets (12.5 mg) by mouth daily, Disp: 90 tablet, Rfl: 1    multivitamin with iron (CHROMAGEN) TABS half-tab, Take 1 tablet by mouth daily, Disp: , Rfl:     neomycin-polymyxin-dexAMETHasone (MAXITROL) 3.5-91892-4.1 SUSP ophthalmic susp, , Disp: , Rfl:     nicotine (NICOTROL) 10 MG inhaler, Use 1 cartridge as needed for urge to smoke by puffing over course of 20min.  Use 6-16 cart/day; reduce number of cart/day over 6-12 weeks., Disp: 168 each, Rfl: 1    Nutritional Supplements (NUTRITIONAL SHAKE HIGH PROTEIN) LIQD, Take 1 each by mouth every morning Dispense Premier protein shakes. Replace breakfast with 1 shake daily., Disp: 9900 mL, Rfl: 11    olopatadine (PATADAY) 0.2 % ophthalmic solution, Place 1 drop into both eyes daily, Disp: 2.5 mL, Rfl: 5    oxybutynin ER (DITROPAN XL) 10 MG 24 hr tablet, Take 1 tablet (10 mg) by mouth daily, Disp: 90 tablet, Rfl: 3    Polyethylene Glycol POWD, See Admin Instructions Use daily as needed, Disp: , Rfl:      prazosin (MINIPRESS) 2 MG capsule, Take 1 capsule (2 mg) by mouth at bedtime, Disp: 90 capsule, Rfl: 1    pregabalin (LYRICA) 25 MG capsule, Take 1 tab three times daily for 2 wks then every 2 weeks add 1 tab until taking 2 tabs three times a day., Disp: 180 capsule, Rfl: 1    pregabalin (LYRICA) 25 MG capsule, Take 1 capsule (25 mg) by mouth 3 times daily, Disp: 90 capsule, Rfl: 1    salmeterol (SEREVENT DISKUS) 50 MCG/ACT inhaler, INHALE 1 PUFF INTO THE LUNGS TWICE A DAY, Disp: 60 each, Rfl: 2    senna (SENNA-TIME) 8.6 MG tablet, TAKE 2 TABLETS (17.2MG) BY MOUTH TWICE A DAY, Disp: 120 tablet, Rfl: 11    SUMAtriptan (IMITREX) 50 MG tablet, Take 1 tablet (50 mg) by mouth at onset of headache for migraine May repeat in 2 hours. Max 4 tablets/24 hours., Disp: 9 tablet, Rfl: 1    tiZANidine (ZANAFLEX) 4 MG capsule, Take 4 mg by mouth 3 times daily Per pt report, Disp: , Rfl:     topiramate (TOPAMAX) 50 MG tablet, Take 1 tablet (50 mg) by mouth At Bedtime, Disp: 90 tablet, Rfl: 3    varenicline (CHANTIX) 1 MG tablet, TAKE 1 TABLET BY MOUTH TWICE A DAY, Disp: 56 tablet, Rfl: 2    Vibegron (GEMTESA) 75 MG TABS tablet, Take 75 mg by mouth daily, Disp: , Rfl:     vitamin D3 (CHOLECALCIFEROL) 50 mcg (2000 units) tablet, TAKE 2 TABLETS BY MOUTH DAILY, Disp: 180 tablet, Rfl: 3    warfarin ANTICOAGULANT (COUMADIN) 5 MG tablet, TAKE 2 TABLETS BY MOUTH ON MON AND FRI ; TAKE 2 & 1/2 TABLETS BY MOUTH ALL OTHER DAYS AS DIRECTED BY INR CLINIC, Disp: 180 tablet, Rfl: 3    Current Facility-Administered Medications:     denosumab (PROLIA) injection 60 mg, 60 mg, Subcutaneous, Q6 Months, Alice Rolle MD, 60 mg at 11/14/23 1445    denosumab (PROLIA) injection 60 mg, 60 mg, Subcutaneous, Q6 Months, Alice Rolle MD       /64 (BP Location: Right arm, Patient Position: Sitting, Cuff Size: Adult Large)   Pulse 94   LMP  (LMP Unknown)      MSE:      Alert & oriented x 3.   Appearance: Neat.  Speech: Normal rate, rhythm and  tone.  Gait: Not observed, in w/c  Musculoskeletal: No abnormal movements. No fasciculations.  Mood/Affect: Labile, tearful  Thought Process: Normal rate, perseverative  Thought Content: No suicide or homicide ideation.  Associations: Intact, no delusions.  Perceptions: VH-saw a body and animal last week. No AH.  Memory: recent and remote memory intact, not formally tested  Attention span and concentration: normal, not formally tested  Language: Intact.  Fund of Knowledge: Normal.  Insight and Judgement: Fair         Total time spent on this encounter, on the date of service, including pre-visit review of separately obtained history, face-to-face interaction performing medically appropriate physical exam, patient counseling/education, interpretation of diagnostic results, care coordination and documentation was 51 minutes.        Breana Puente MD

## 2023-12-13 ENCOUNTER — TRANSFERRED RECORDS (OUTPATIENT)
Dept: HEALTH INFORMATION MANAGEMENT | Facility: CLINIC | Age: 63
End: 2023-12-13
Payer: COMMERCIAL

## 2023-12-13 ENCOUNTER — TELEPHONE (OUTPATIENT)
Dept: FAMILY MEDICINE | Facility: CLINIC | Age: 63
End: 2023-12-13
Payer: COMMERCIAL

## 2023-12-13 ENCOUNTER — ANTICOAGULATION THERAPY VISIT (OUTPATIENT)
Dept: ANTICOAGULATION | Facility: CLINIC | Age: 63
End: 2023-12-13
Payer: COMMERCIAL

## 2023-12-13 LAB — INR (EXTERNAL): 2.9 (ref 0.9–1.1)

## 2023-12-13 NOTE — TELEPHONE ENCOUNTER
Medication Question or Refill    Contacts         Type Contact Phone/Fax    12/13/2023 03:13 PM CST Phone (Incoming) [facility] [phone]          What medication are you calling about (include dose and sig)?: Home health nurse Katelyn, calling to say that Carbamazepine 200 mg tablet and Abilify 10 mg tablet have a potential interaction warning while HomeHealth RN was completing Med Rec.  Would prescriber like to change these medications?    Pref[place] Pharmacy[place]   [facility] [place] [address], [address]  [address] [place] [address], [address] [place] [address]  Phone: [phone] Fax: [phone]      Controlled Substance Agreement on file:   CSA -- Patient Level:    CSA: None found at the patient level.       Who prescribed the medication?: Dr Puente    Do you have any questions or concerns?  Yes: Would prescribing provider like to change the medication.

## 2023-12-13 NOTE — PROGRESS NOTES
ANTICOAGULATION MANAGEMENT     Valentina Olmedo 63 year old female is on warfarin with therapeutic INR result. (Goal INR 2.0-3.0)    Recent labs: (last 7 days)     12/13/23  0000   INR 2.9*       ASSESSMENT     Source(s): Chart Review and Home Care/Facility Nurse     Warfarin doses taken: Warfarin taken as instructed  Diet: No new diet changes identified  Medication/supplement changes: None noted  New illness, injury, or hospitalization: No  Signs or symptoms of bleeding or clotting: No  Previous result: Subtherapeutic  Additional findings: Maintenance dose was increased to 10 mg daily on 12/6/23. INR level may continue to trend up. Recommend a small adjustment.       PLAN     Recommended plan for ongoing change(s) affecting INR     Dosing Instructions: decrease your warfarin dose (3.6% change) with next INR in 1 week       Summary  As of 12/13/2023      Full warfarin instructions:  7.5 mg every Wed; 10 mg all other days   Next INR check:  12/19/2023               Telephone call with Katelyn at [phone] home care nurse who verbalizes understanding and agrees to plan    Orders given to  Homecare nurse/facility to recheck    Education provided:   Contact [phone]  with any changes, questions or concerns.     Plan made per [facility] anticoagulation protocol    Savita Calle RN  Anticoagulation Clinic  12/13/2023    _______________________________________________________________________     Anticoagulation Episode Summary       Current INR goal:  2.0-3.0   TTR:  52.7% (1 y)   Target end date:  Indefinite   Send INR reminders to:  P[facility]    Indications    History of pulmonary embolism (Resolved) [Z86.711]  Pulmonary embolism with infarction (H) [I26.99]  Long term (current) use of anticoagulants [Z79.01]  History of pulmonary embolism [Z86.711]             Comments:               Anticoagulation Care Providers       Provider Role Specialty Phone number    Promise Vanegas MD Referring Family Medicine  [phone]    Donald Rooney MD Referring Internal Medicine [phone]    Jose Maria Morin MD Referring Family Medicine [phone]

## 2023-12-14 DIAGNOSIS — Z53.9 DIAGNOSIS NOT YET DEFINED: Primary | ICD-10-CM

## 2023-12-14 PROCEDURE — G0179 MD RECERTIFICATION HHA PT: HCPCS | Performed by: FAMILY MEDICINE

## 2023-12-15 ENCOUNTER — MEDICAL CORRESPONDENCE (OUTPATIENT)
Dept: HEALTH INFORMATION MANAGEMENT | Facility: CLINIC | Age: 63
End: 2023-12-15
Payer: COMMERCIAL

## 2023-12-19 ENCOUNTER — ANTICOAGULATION THERAPY VISIT (OUTPATIENT)
Dept: ANTICOAGULATION | Facility: CLINIC | Age: 63
End: 2023-12-19
Payer: COMMERCIAL

## 2023-12-19 DIAGNOSIS — I26.99 PULMONARY EMBOLISM WITH INFARCTION (H): Primary | ICD-10-CM

## 2023-12-19 DIAGNOSIS — Z86.711 HISTORY OF PULMONARY EMBOLISM: ICD-10-CM

## 2023-12-19 DIAGNOSIS — Z79.01 LONG TERM (CURRENT) USE OF ANTICOAGULANTS: ICD-10-CM

## 2023-12-19 LAB — INR (EXTERNAL): 2.6 (ref 0.9–1.1)

## 2023-12-19 NOTE — PROGRESS NOTES
ANTICOAGULATION MANAGEMENT     Valentina Olmedo 63 year old female is on warfarin with therapeutic INR result. (Goal INR 2.0-3.0)    Recent labs: (last 7 days)     12/19/23  1549   INR 2.6*       ASSESSMENT     Source(s): Chart Review and Home Care/Facility Nurse     Warfarin doses taken: Warfarin taken as instructed  Diet: No new diet changes identified  Medication/supplement changes:  starting Abilify 12/20, no interaction with warfarin expected  New illness, injury, or hospitalization: No  Signs or symptoms of bleeding or clotting: No  Previous result: Therapeutic last visit; previously outside of goal range  Additional findings: None       PLAN     Recommended plan for no diet, medication or health factor changes affecting INR     Dosing Instructions: Continue your current warfarin dose with next INR in 7-10 days       Summary  As of 12/19/2023      Full warfarin instructions:  7.5 mg every Wed; 10 mg all other days   Next INR check:  12/27/2023               Telephone call with [facility] home care nurse who verbalizes understanding and agrees to plan and who agrees to plan and repeated back plan correctly    Orders given to  Homecare nurse/facility to recheck    Education provided:   Contact [phone]  with any changes, questions or concerns.     Plan made per [facility] anticoagulation protocol    Ashley Naranjo RN  Anticoagulation Clinic  12/19/2023    _______________________________________________________________________     Anticoagulation Episode Summary       Current INR goal:  2.0-3.0   TTR:  54.1% (1 y)   Target end date:  Indefinite   Send INR reminders to:  PIP[facility] MATHUR    Indications    History of pulmonary embolism (Resolved) [Z86.711]  Pulmonary embolism with infarction (H) [I26.99]  Long term (current) use of anticoagulants [Z79.01]  History of pulmonary embolism [Z86.711]             Comments:               Anticoagulation Care Providers       Provider Role Specialty Phone number    Promise Vanegas  MD [place] Referring Family Medicine [phone]    Donald Rooney MD Referring Internal Medicine [phone]    Jose Maria Morin MD Referring Family Medicine [phone]

## 2023-12-19 NOTE — TELEPHONE ENCOUNTER
RN phoned Katelyn and relayed Dr. Puente's directives. Katelyn verbalized understanding. She will follow up with the prescriber of Carbemazepine.     RANULFO MAX RN on 12/19/2023 at 11:23 AM

## 2023-12-19 NOTE — TELEPHONE ENCOUNTER
"RN to contact home nurse.  Tell nurse:  The interaction can cause decreased levels of Abilify.  2.    I will adjust Abilify dose if needed.  3.     I do not prescribe carbamazepine.      [employer] excerpt:    \"Concurrent use of ARIPIPRAZOLE and STRONG CY INDUCERS may result in reduced ARIPiprazole exposure.  Clinical Management:  Coadministration of ARIPiprazole and a strong CY inducer may result in decreased ARIPiprazole plasma levels. Avoid coadministration of long-acting ARIPiprazole injection with strong CY inducers for more than 14 days. Dosage adjustments are not recommended for patients with concomitant use of CY inducers for less than 14 days (Prod Info ABILIFY MAINTENA  intramuscular extended-release injection, ; Prod Info ABILIFY ASIMTUFII  intramuscular extended-release suspension, ). Double the normal ARIPiprazole dose over 1 to 2 weeks when these agents are coadministered. No dose adjustments are necessary for patients being treated for major depressive disorder. If the CY inducer is discontinued, the ARIPiprazole dose should be decreased over 1 to 2 weeks to the original level.\"  "

## 2023-12-21 ENCOUNTER — MEDICAL CORRESPONDENCE (OUTPATIENT)
Dept: HEALTH INFORMATION MANAGEMENT | Facility: CLINIC | Age: 63
End: 2023-12-21
Payer: COMMERCIAL

## 2023-12-22 DIAGNOSIS — M54.12 CERVICAL RADICULOPATHY: ICD-10-CM

## 2023-12-22 DIAGNOSIS — J30.2 SEASONAL ALLERGIC RHINITIS, UNSPECIFIED TRIGGER: ICD-10-CM

## 2023-12-22 RX ORDER — PREGABALIN 25 MG/1
CAPSULE ORAL
Qty: 180 CAPSULE | Refills: 1 | Status: SHIPPED | OUTPATIENT
Start: 2023-12-22 | End: 2024-03-04

## 2023-12-22 RX ORDER — FEXOFENADINE HCL 180 MG/1
180 TABLET ORAL DAILY
Qty: 30 TABLET | Refills: 0 | Status: SHIPPED | OUTPATIENT
Start: 2023-12-22 | End: 2024-01-22

## 2023-12-22 NOTE — TELEPHONE ENCOUNTER
Received call from patient requesting refill(s) of Lyrica 25 mg capsules     Last dispensed from pharmacy on 12/12    Patient's last office/virtual visit by prescribing provider on 11/7  Next office/virtual appointment not scheduled. Cancelled 12/18 follow-up. Discussed need to schedule follow-up and that her provider will be out of office after today for 3 months.    Called to discuss how pt is taking Lyrica. She states she is currently taking 2 capsules AM, 2 PM, and 1 at HS. She plans to increase to 2 caps tid.    UDT/CSA- not noted    Pending Prescriptions:                       Disp   Refills    pregabalin (LYRICA) 25 MG capsule         180 ca*1            Sig: Take 1 tab three times daily for 2 wks then every           2 weeks add 1 tab until taking 2 tabs three times           a day.

## 2023-12-26 DIAGNOSIS — J30.2 SEASONAL ALLERGIC RHINITIS, UNSPECIFIED TRIGGER: ICD-10-CM

## 2023-12-27 ENCOUNTER — ANTICOAGULATION THERAPY VISIT (OUTPATIENT)
Dept: ANTICOAGULATION | Facility: CLINIC | Age: 63
End: 2023-12-27

## 2023-12-27 ENCOUNTER — ALLIED HEALTH/NURSE VISIT (OUTPATIENT)
Dept: FAMILY MEDICINE | Facility: CLINIC | Age: 63
End: 2023-12-27
Payer: COMMERCIAL

## 2023-12-27 DIAGNOSIS — Z23 ENCOUNTER FOR IMMUNIZATION: Primary | ICD-10-CM

## 2023-12-27 DIAGNOSIS — I26.99 PULMONARY EMBOLISM WITH INFARCTION (H): Primary | ICD-10-CM

## 2023-12-27 DIAGNOSIS — Z79.01 LONG TERM (CURRENT) USE OF ANTICOAGULANTS: ICD-10-CM

## 2023-12-27 DIAGNOSIS — Z86.711 HISTORY OF PULMONARY EMBOLISM: ICD-10-CM

## 2023-12-27 LAB — INR (EXTERNAL): 2.5 (ref 0.9–1.1)

## 2023-12-27 PROCEDURE — 90677 PCV20 VACCINE IM: CPT

## 2023-12-27 PROCEDURE — G0009 ADMIN PNEUMOCOCCAL VACCINE: HCPCS

## 2023-12-27 PROCEDURE — 99207 PR NO CHARGE NURSE ONLY: CPT

## 2023-12-27 NOTE — PROGRESS NOTES
Prior to immunization administration, verified patients identity using patient s name and date of birth. Please see Immunization Activity for additional information.     Screening Questionnaire for Adult Immunization    Are you sick today?   No   Do you have allergies to medications, food, a vaccine component or latex?   No   Have you ever had a serious reaction after receiving a vaccination?   No   Do you have a long-term health problem with heart, lung, kidney, or metabolic disease (e.g., diabetes), asthma, a blood disorder, no spleen, complement component deficiency, a cochlear implant, or a spinal fluid leak?  Are you on long-term aspirin therapy?   No   Do you have cancer, leukemia, HIV/AIDS, or any other immune system problem?   No   Do you have a parent, brother, or sister with an immune system problem?   No   In the past 3 months, have you taken medications that affect  your immune system, such as prednisone, other steroids, or anticancer drugs; drugs for the treatment of rheumatoid arthritis, Crohn s disease, or psoriasis; or have you had radiation treatments?   No   Have you had a seizure, or a brain or other nervous system problem?   No   During the past year, have you received a transfusion of blood or blood    products, or been given immune (gamma) globulin or antiviral drug?   No   For women: Are you pregnant or is there a chance you could become       pregnant during the next month?   No   Have you received any vaccinations in the past 4 weeks?   No     Immunization questionnaire answers were all negative.    I have reviewed the following standing orders: This patient is due and qualifies for the Pneumococcal vaccine.    Click here for Pneumococcal (Adult) Standing Order    I have reviewed the vaccines inclusion and exclusion criteria;No concerns regarding eligibility.     Patient instructed to remain in clinic for 15 minutes afterwards, and to report any adverse reactions.     Screening performed by  Lee David Jr., MA on 12/27/2023 at 11:10 AM.

## 2023-12-27 NOTE — PROGRESS NOTES
ANTICOAGULATION MANAGEMENT     Valentina Olmedo 63 year old female is on warfarin with therapeutic INR result. (Goal INR 2.0-3.0)    Recent labs: (last 7 days)     12/27/23  0907   INR 2.5*       ASSESSMENT     Source(s): Chart Review and Home Care/Facility Nurse     Warfarin doses taken: Warfarin taken as instructed  Diet: No new diet changes identified  Medication/supplement changes: None noted  New illness, injury, or hospitalization: No  Signs or symptoms of bleeding or clotting: No  Previous result: Therapeutic last 2(+) visits  Additional findings: None       PLAN     Recommended plan for no diet, medication or health factor changes affecting INR     Dosing Instructions: Continue your current warfarin dose with next INR in 2 weeks       Summary  As of 12/27/2023      Full warfarin instructions:  7.5 mg every Wed; 10 mg all other days   Next INR check:  1/10/2024               Telephone call with [facility] home care nurse who agrees to plan and repeated back plan correctly    Orders given to  Homecare nurse/facility to recheck    Education provided:   Please call back if any changes to your diet, medications or how you've been taking warfarin  Symptom monitoring: monitoring for bleeding signs and symptoms, monitoring for clotting signs and symptoms, and monitoring for stroke signs and symptoms  Contact [phone]  with any changes, questions or concerns.     Plan made per [facility] anticoagulation protocol    Zari Cheng RN  Anticoagulation Clinic  12/27/2023    _______________________________________________________________________     Anticoagulation Episode Summary       Current INR goal:  2.0-3.0   TTR:  56.2% (1 y)   Target end date:  Indefinite   Send INR reminders to:  PIP[facility]ATHUR    Indications    History of pulmonary embolism (Resolved) [Z86.711]  Pulmonary embolism with infarction (H) [I26.99]  Long term (current) use of anticoagulants [Z79.01]  History of pulmonary embolism [Z86.711]              Comments:               Anticoagulation Care Providers       Provider Role Specialty Phone number    Promise Vanegas MD Referring Family Medicine [phone]    Donald Rooney MD Referring Internal Medicine [phone]    Jose Maria Morin MD Referring Family Medicine [phone]

## 2023-12-28 RX ORDER — FEXOFENADINE HCL 180 MG/1
180 TABLET ORAL DAILY
Qty: 30 TABLET | Refills: 0 | OUTPATIENT
Start: 2023-12-28

## 2023-12-29 DIAGNOSIS — J44.9 CHRONIC OBSTRUCTIVE PULMONARY DISEASE, UNSPECIFIED COPD TYPE (H): ICD-10-CM

## 2023-12-29 RX ORDER — ALBUTEROL SULFATE 90 UG/1
2 AEROSOL, METERED RESPIRATORY (INHALATION) EVERY 4 HOURS PRN
Qty: 8 G | Refills: 4 | Status: SHIPPED | OUTPATIENT
Start: 2023-12-29

## 2023-12-29 NOTE — TELEPHONE ENCOUNTER
Pharmacist calling to touch base on request. She states that their deliveries leave at 3pm and they are closed on the weekend.  Pt is out and if its not filled before 3pm, patient will be without the medication over the holiday weekend.   Please advise.

## 2023-12-29 NOTE — TELEPHONE ENCOUNTER
Medication Request  Medication name: albuterol (PROAIR HFA) 108 (90 Base) MCG/ACT inhaler   Requested Pharmacy:  [facility]  When was patient last seen for this?:  2023  Patient offered appointment:  N/A pharmacy called with request  Okay to leave a detailed message: no    Patient is out of medication, current prescription at pharmacy has .

## 2024-01-02 ENCOUNTER — MEDICAL CORRESPONDENCE (OUTPATIENT)
Dept: HEALTH INFORMATION MANAGEMENT | Facility: CLINIC | Age: 64
End: 2024-01-02
Payer: COMMERCIAL

## 2024-01-02 ENCOUNTER — TELEPHONE (OUTPATIENT)
Dept: FAMILY MEDICINE | Facility: CLINIC | Age: 64
End: 2024-01-02
Payer: COMMERCIAL

## 2024-01-02 NOTE — TELEPHONE ENCOUNTER
Home Health Care    Reason for call:  PT Update: Pt reported a fall that happened on 12/27. Stated that she got wedged between a car and the cart aguilar at [facility], she fell on her knees, did not report hitting her head. RN states that pt has some bruising on knees, but is otherwise doing well         Phone Number Homecare Nurse can be reached at: [phone], no call back requested

## 2024-01-03 ENCOUNTER — TELEPHONE (OUTPATIENT)
Dept: ANTICOAGULATION | Facility: CLINIC | Age: 64
End: 2024-01-03

## 2024-01-03 ENCOUNTER — OFFICE VISIT (OUTPATIENT)
Dept: FAMILY MEDICINE | Facility: CLINIC | Age: 64
End: 2024-01-03
Payer: COMMERCIAL

## 2024-01-03 ENCOUNTER — TELEPHONE (OUTPATIENT)
Dept: FAMILY MEDICINE | Facility: CLINIC | Age: 64
End: 2024-01-03

## 2024-01-03 VITALS
OXYGEN SATURATION: 92 % | DIASTOLIC BLOOD PRESSURE: 84 MMHG | TEMPERATURE: 97.4 F | WEIGHT: 293 LBS | HEART RATE: 92 BPM | RESPIRATION RATE: 16 BRPM | SYSTOLIC BLOOD PRESSURE: 124 MMHG | HEIGHT: 67 IN | BODY MASS INDEX: 45.99 KG/M2

## 2024-01-03 DIAGNOSIS — L98.9 SKIN LESION: Primary | ICD-10-CM

## 2024-01-03 DIAGNOSIS — I26.99 PULMONARY EMBOLISM WITH INFARCTION (H): Primary | ICD-10-CM

## 2024-01-03 DIAGNOSIS — Z86.711 HISTORY OF PULMONARY EMBOLISM: ICD-10-CM

## 2024-01-03 DIAGNOSIS — Z29.11 NEED FOR VACCINATION AGAINST RESPIRATORY SYNCYTIAL VIRUS: ICD-10-CM

## 2024-01-03 DIAGNOSIS — I26.99 PULMONARY EMBOLISM WITH INFARCTION (H): ICD-10-CM

## 2024-01-03 DIAGNOSIS — L30.4 INTERTRIGO: Primary | ICD-10-CM

## 2024-01-03 DIAGNOSIS — Z85.820 HISTORY OF MELANOMA: ICD-10-CM

## 2024-01-03 DIAGNOSIS — H69.93 DYSFUNCTION OF BOTH EUSTACHIAN TUBES: ICD-10-CM

## 2024-01-03 DIAGNOSIS — L30.4 INTERTRIGO: ICD-10-CM

## 2024-01-03 DIAGNOSIS — Z79.01 LONG TERM (CURRENT) USE OF ANTICOAGULANTS: ICD-10-CM

## 2024-01-03 PROBLEM — Z86.16 HISTORY OF COVID-19: Status: RESOLVED | Noted: 2021-04-19 | Resolved: 2024-01-03

## 2024-01-03 PROBLEM — Z79.899 HIGH RISK MEDICATION USE: Status: RESOLVED | Noted: 2021-02-02 | Resolved: 2024-01-03

## 2024-01-03 PROCEDURE — 99214 OFFICE O/P EST MOD 30 MIN: CPT | Performed by: FAMILY MEDICINE

## 2024-01-03 RX ORDER — RESPIRATORY SYNCYTIAL VIRUS VACCINE 120MCG/0.5
0.5 KIT INTRAMUSCULAR ONCE
Qty: 1 EACH | Refills: 0 | Status: CANCELLED | OUTPATIENT
Start: 2024-01-03 | End: 2024-01-03

## 2024-01-03 RX ORDER — CEFADROXIL 500 MG/1
1 CAPSULE ORAL 2 TIMES DAILY
COMMUNITY
End: 2024-01-03

## 2024-01-03 RX ORDER — NYSTATIN 100000 U/G
CREAM TOPICAL 2 TIMES DAILY PRN
Qty: 90 G | Refills: 2 | Status: SHIPPED | OUTPATIENT
Start: 2024-01-03

## 2024-01-03 RX ORDER — MECLIZINE HYDROCHLORIDE 25 MG/1
TABLET ORAL
COMMUNITY
End: 2024-04-17

## 2024-01-03 RX ORDER — NYSTATIN 100000 [USP'U]/G
POWDER TOPICAL 3 TIMES DAILY PRN
Qty: 120 G | Refills: 1 | Status: SHIPPED | OUTPATIENT
Start: 2024-01-03 | End: 2024-04-25

## 2024-01-03 RX ORDER — HYDROCHLOROTHIAZIDE 25 MG/1
TABLET ORAL 2 TIMES DAILY
COMMUNITY
End: 2024-04-17 | Stop reason: ALTCHOICE

## 2024-01-03 RX ORDER — FLUCONAZOLE 150 MG/1
150 TABLET ORAL
Qty: 3 TABLET | Refills: 0 | Status: SHIPPED | OUTPATIENT
Start: 2024-01-03 | End: 2024-01-10

## 2024-01-03 ASSESSMENT — PATIENT HEALTH QUESTIONNAIRE - PHQ9
SUM OF ALL RESPONSES TO PHQ QUESTIONS 1-9: 21
10. IF YOU CHECKED OFF ANY PROBLEMS, HOW DIFFICULT HAVE THESE PROBLEMS MADE IT FOR YOU TO DO YOUR WORK, TAKE CARE OF THINGS AT HOME, OR GET ALONG WITH OTHER PEOPLE: EXTREMELY DIFFICULT
SUM OF ALL RESPONSES TO PHQ QUESTIONS 1-9: 21

## 2024-01-03 NOTE — TELEPHONE ENCOUNTER
"Dr Morin' request:  \"Nystatin powder not available.  Nystatin cream sent as alternative.  Please let patient know about this change. \"    Called Valentina and relayed message as written.    "

## 2024-01-03 NOTE — COMMUNITY RESOURCES LIST (ENGLISH)
01/03/2024   [facility]  N/A  For questions about this resource list or additional care needs, please contact your primary care clinic or care manager.  Phone: [phone]   Email: N/A   Address: [address] [place] [address]   Hours: N/A        Financial Stability       Utility payment assistance  1  [employer] Distance: 1.34 miles      [address] [place] [address]  Language: English, Brazilian  Hours: Mon 9:30 AM - 11:30 AM , Tue 1:30 PM - 7:30 PM , Thu 9:00 AM - 7:00 PM , Fri 9:30 AM - 11:30 AM   Phone: (398) 243-7484 Email: info@[employer] Website: http://[employer].CTMG/     2  [place] [employer] [facility] [place]'s Telephone Assistance Plan (TAP) and Federal Lifeline and Affordable Connectivity Program (ACP) Distance: 8.75 miles      Phone/Virtual   [address] [place] [address]  Language: English  Fees: Free   Phone: (920) 650-5171 Email: consumer.puc@[facility]. Website: https://[place]puc[place]consumers/telephone/          Food and Nutrition       Food pantry  3  [employer] Distance: 1.24 miles      In-Person, Pickup   [address] [place] [address]  Language: English  Hours: Mon - Thu 9:00 AM - 10:00 AM  Fees: Free   Phone: (382) 228-2884 Email: communication@[employer].org Website: http://www.[facility].org     4  [employer] - Food Shelf Distance: 1.34 miles      [facility]   [address] [place] [address]  Language: English, Brazilian  Hours: Mon 9:30 AM - 11:30 AM , Wed 9:30 AM - 11:30 AM , Thu 1:30 PM - 6:30 PM , Fri 9:30 AM - 11:30 AM  Fees: Free   Phone: (379) 695-7982 Email: info@[employer] Website: http://[employer].org/     SNAP application assistance  5  [facility] - Economic Support [facility] Distance: 0.33 miles      In-Person, Phone/Virtual   [address] [address]  Language: English  Hours: Mon - Fri  8:00 AM - 4:30 PM  Fees: Free   Phone: (690) 857-7066 Email: angelsunniplacido@[facility]. Website: https://www.coLocishM[facility] Coast District HospitalLocish/787/Economic-Support     [address] [place] [address] - Economic [facility] Distance: 10.21 miles      In-Person, Phone/Virtual   [address] [place] [address]  Language: English  Hours: Mon - Fri 8:00 AM - 4:30 PM  Fees: Free   Phone: (943) 462-3982 Email: geetha@co.[facility]. Website: https://www.co.[facility] Hospital[facility]/787/Economic-Support     Soup kitchen or free meals  7  [facility] Outreach Meal for Everyone (HOME) Distance: 4.43 miles      [address] [address]  Language: English  Hours: Thu 5:30 PM - 6:30 PM  Fees: Free   Phone: (850) 121-2050 Email: lon@[employer] Website: http://[employer]/     8  [facility] - Thursday Night Community Meal Distance: 7.16 miles      In-Person   [address] [place] [address]  Language: English, Pashto  Hours: Thu 6:00 PM - 7:00 PM  Fees: Free   Phone: (736) 697-2259 Email: center@[employer] Website: https://www.[employer]/community-resource-center/          Important Numbers & Websites       Emergency Services   911  [facility]   311  Poison Control   (149) 350-4605  Suicide Prevention Lifeline   (494) 884-1313 (TALK)  Child Abuse Hotline   (556) 931-2707 (4-A-Child)  Sexual Assault Hotline   (163) 400-1385 (HOPE)  National Runaway Safeline   (680) 999-7601 (RUNAWAY)  All-Options Talkline   (831) 916-2831  Substance Abuse Referral   (338) 576-5253 (HELP)

## 2024-01-03 NOTE — TELEPHONE ENCOUNTER
Valentina returned the call, education provided on potential interaction. She reports that she will start the Diflucan tonight.     Called and updated Katelyn with [facility] home care, orders given for INR check on Friday 1/5/24.

## 2024-01-03 NOTE — ASSESSMENT & PLAN NOTE
Bilateral symptoms.  No evidence of infection or ceruminosis.  Some symptoms that also might be consistent with TMJ, condition she had previously.  Patient reports symptoms of bruxism which may be contributory.  For now, she will start taking azelastine which has been previously prescribed but not yet taken.  She will also try to reach out to her dentist.

## 2024-01-03 NOTE — CONFIDENTIAL NOTE
Nystatin powder not available.  Nystatin cream sent as alternative.  Please let patient know about this change.

## 2024-01-03 NOTE — ASSESSMENT & PLAN NOTE
This patient has a small ulcerated lesion that has been unhealing in her left nasolabial fold.  Duration is of approximately 12 months.  History of melanoma.  Ongoing surveillance through [facility] and [place] (Dr. Abraham Hilton).  I think this should be evaluated intentionally biopsied by dermatology.  Referral placed and assistance offered and scheduling.  My understanding is the patient has an appointment tomorrow (1/4/2024).

## 2024-01-03 NOTE — ASSESSMENT & PLAN NOTE
This patient has severe symptoms of intertrigo throughout her skin folds in her groin.  We discussed trying to minimize hygiene.  To my knowledge, she has not recently been on antibiotics.  Given the severity we will treat with oral antifungals (Diflucan x 3 doses every 72 hours prescribed).  We discussed the potential impact on INR.  Note sent to INR nursing team.  Maintenance with nystatin going forward.

## 2024-01-03 NOTE — PROGRESS NOTES
Assessment & Plan   Problem List Items Addressed This Visit       Dysfunction of both eustachian tubes     Bilateral symptoms.  No evidence of infection or ceruminosis.  Some symptoms that also might be consistent with TMJ, condition she had previously.  Patient reports symptoms of bruxism which may be contributory.  For now, she will start taking azelastine which has been previously prescribed but not yet taken.  She will also try to reach out to her dentist.         History of melanoma     This patient has a small ulcerated lesion that has been unhealing in her left nasolabial fold.  Duration is of approximately 12 months.  History of melanoma.  Ongoing surveillance through [facility] and [place] (Dr. Abraham Hilton).  I think this should be evaluated intentionally biopsied by dermatology.  Referral placed and assistance offered and scheduling.  My understanding is the patient has an appointment tomorrow (1/4/2024).         Relevant Orders    Adult Dermatology  Referral    Intertrigo     This patient has severe symptoms of intertrigo throughout her skin folds in her groin.  We discussed trying to minimize hygiene.  To my knowledge, she has not recently been on antibiotics.  Given the severity we will treat with oral antifungals (Diflucan x 3 doses every 72 hours prescribed).  We discussed the potential impact on INR.  Note sent to INR nursing team.  Maintenance with nystatin going forward.         Relevant Medications    fluconazole (DIFLUCAN) 150 MG tablet    nystatin (MYCOSTATIN) 224508 UNIT/GM external powder    Pulmonary embolism with infarction (H)     Chronic warfarin use.  Today's plan will impact INR.  Monitor.          Other Visit Diagnoses       Skin lesion    -  Primary    Relevant Medications    nystatin (MYCOSTATIN) 462474 UNIT/GM external powder    Other Relevant Orders    Adult Dermatology  Referral    Need for vaccination against respiratory syncytial virus           "    Jose Maria Morin MD  [facility] QUIANA Montgomery is a 63 year old, presenting for the following health issues:  Sore on left side of mouth for months and Ear Problem (Possible ear infection)        1/3/2024     9:23 AM   Additional Questions   Roomed by xl   Accompanied by        Skin concern:   - she has applied antifungal.  No improvement   - ongoing for ayear   - painful, itchy.    - sees [facility]    Ear:    - \"I am hearing myself talk.   - pain in her ears on both sides.   - onset: about a week ago   - no drainage   - last ear infection: \"a couple of years ago.\"    -  pain worse with chewing   - come crackling when she yawns   - congestion: continues   - history of ETD, history of bruxism.     Intertrigo    - Pain burning and redness in the folds of skin in her groin.  Onset: 1-2 weeks.  Worsening.  Water and hygiene activities cause discomfort.  She has not previously had these.  There is a report that she is recently been on antibiotics which the patient refutes.  Request something to help with discomfort and to dry out the wetness.  She does not endorse urinary incontinence.    History of Present Illness       Reason for visit:  Ears plugged and a small sore that just wont disappear  Symptom onset:  More than a month  Symptom intensity:  Moderate  Symptom progression:  Staying the same  Had these symptoms before:  Yes  Has tried/received treatment for these symptoms:  No  What makes it worse:  Stress  What makes it better:  Unknown    She eats 2-3 servings of fruits and vegetables daily.She consumes 1 sweetened beverage(s) daily.She exercises with enough effort to increase her heart rate 30 to 60 minutes per day.  She exercises with enough effort to increase her heart rate 4 days per week.   She is taking medications regularly.     Review of Systems   HENT:  Positive for ear pain.             Objective    /84 (BP Location: Right arm, Patient " "Position: Sitting, Cuff Size: Adult Large)   Pulse 92   Temp 97.4  F (36.3  C) (Oral)   Resp 16   Ht 1.689 m (5' 6.5\")   Wt (!) 158.9 kg (350 lb 6.4 oz)   LMP  (LMP Unknown)   SpO2 92%   BMI 55.71 kg/m    Body mass index is 55.71 kg/m .  Physical Exam  Nursing note reviewed.   Constitutional:       General: She is not in acute distress.     Appearance: Normal appearance. She is obese. She is not ill-appearing.   HENT:      Head: Normocephalic and atraumatic.      Right Ear: Tympanic membrane and ear canal normal. There is no impacted cerumen.      Left Ear: Tympanic membrane and ear canal normal. There is no impacted cerumen.   Eyes:      Extraocular Movements: Extraocular movements intact.      Conjunctiva/sclera: Conjunctivae normal.   Pulmonary:      Effort: Pulmonary effort is normal.   Skin:     Comments: Left nasolabial fold has a 3 mm circular ulcerated lesion without drainage.    Folds of skin of the groin with erythema and odor.  Some satellite lesions.  No skin breakdown.   Neurological:      Mental Status: She is alert and oriented to person, place, and time.   Psychiatric:         Attention and Perception: Attention normal.         Mood and Affect: Mood normal.         Speech: Speech normal.         Thought Content: Thought content normal.                          "

## 2024-01-03 NOTE — TELEPHONE ENCOUNTER
ANTICOAGULATION  MANAGEMENT     Interacting Medication Review    Interacting medication(s): Fluconazole (Diflucan) with warfarin.    Duration:  one dose every 3 days for a total of 3 doses    Indication:  skin infection    New medication?: Yes, interaction may increase INR and risk of bleeding. With Fluconazole courses > 3 days [facility] protocol Appendix G recommends empiric reduction of warfarin dose in therapeutic/supratherapeutic patients.        PLAN     Continue your current warfarin dose with next INR in 2-5 days        Summary  As of 1/3/2024      Full warfarin instructions:  7.5 mg every Wed; 10 mg all other days   Next INR check:  1/8/2024               Left voice message for Valentina requesting call back to determine when she will be starting the fluconazole, will then contact home care with a specific recheck date (Friday or Monday okay)    New recheck date updated on anticoagulation calendar     Plan made with [facility] Pharmacist Paula Smith RN  Anticoagulation Clinic

## 2024-01-03 NOTE — Clinical Note
Thank you for being so quick to catch that you I was prescribed Diflucan.  Appreciate the assistance in helping ensure safety with her INR.  Her skin infection is quite severe and unfortunately oral therapy is indicated.

## 2024-01-05 ENCOUNTER — VIRTUAL VISIT (OUTPATIENT)
Dept: BEHAVIORAL HEALTH | Facility: CLINIC | Age: 64
End: 2024-01-05
Payer: COMMERCIAL

## 2024-01-05 ENCOUNTER — ANTICOAGULATION THERAPY VISIT (OUTPATIENT)
Dept: ANTICOAGULATION | Facility: CLINIC | Age: 64
End: 2024-01-05
Payer: COMMERCIAL

## 2024-01-05 DIAGNOSIS — F33.1 MAJOR DEPRESSIVE DISORDER, RECURRENT EPISODE, MODERATE (H): Primary | ICD-10-CM

## 2024-01-05 DIAGNOSIS — Z86.711 HISTORY OF PULMONARY EMBOLISM: ICD-10-CM

## 2024-01-05 DIAGNOSIS — Z79.01 LONG TERM (CURRENT) USE OF ANTICOAGULANTS: ICD-10-CM

## 2024-01-05 DIAGNOSIS — I26.99 PULMONARY EMBOLISM WITH INFARCTION (H): Primary | ICD-10-CM

## 2024-01-05 LAB — INR (EXTERNAL): 2.6 (ref 0.9–1.1)

## 2024-01-05 NOTE — PROGRESS NOTES
Transition Clinic - Initial Visit Progress Note    Patient  Name: Valentina Olmedo, : 1960    Date:  2024       Service Type:  Mental Health Initial Visit     Visit Start Time: 1410 Visit End Time: 1450    Session Length:   TC Session Length: 45 (38-52 Minutes)    Visit #: 1    Attendees:  TC Attendees: Client alone    Service Modality:  Service Modality: Video Visit:      Provider verified identity through the following two step process.  Patient provided:  Patient  and Patient address    Telemedicine Visit: The patient's condition can be safely assessed and treated via synchronous audio and visual telemedicine encounter.      Reason for Telemedicine Visit: Patient has requested telehealth visit    Originating Site (Patient Location): Patient's home    Distant Site (Provider Location): [facility]    Consent:  The patient/guardian has verbally consented to: the potential risks and benefits of telemedicine (video visit) versus in person care; bill my insurance or make self-payment for services provided; and responsibility for payment of non-covered services.     Patient would like the video invitation sent by:  [phone]     Mode of Communication:  Video Conference via [facility]    Distant Location (Provider):  On-site    As the provider I attest to compliance with applicable laws and regulations related to telemedicine.    Informed Consent and Assessment Methods    This provider and patient discussed HIPAA, and the limits of confidentiality; including mandated reporting, the possibility of collaborative discussions with patient's primary care provider and the multidisciplinary team in the MH clinic during consultation.  Discussed the no show policy, and the benefits and possible unintended consequences of therapy.  Patient indicated understanding Transition Clinic services are short term, solution focused, until a referral can be made and patient can bridge to long term therapy.  Patient  "verbally indicated understanding the informed consent.         Presenting Concerns/  Current Stressors:  Valentina Olmedo is a 63 year old identified female who was referred to the Transition Clinic by Dr. Puente for psychotherapy bridging services until her  [facility] appt on 3/24/24.  Valentina Olmedo reports \"I had a breakdown before the holidays.\"  Reported feeling increase in depression and isolation.     ST and LT memory deficits.  Feels like she can't remember anything.   Reported conflict in marriage w/ spouse.    Lives a small apt so she can only have one pet.   She feels like she needs a bigger place because she has a scooter, wheelchair, walker, ect.  Feels like she has accessibility issues in her apt.   Medical : epilepsy / seizure free since 2002  Schizophrenia    2021 Broke lower back, L2, L3 and L5   Arthritis    ASSESSMENT:    Required Screenings: The following assessments were completed by patient for this visit:  PHQ9:       5/26/2023     2:09 PM 7/25/2023    11:00 AM 8/21/2023     8:36 PM 10/26/2023     1:45 PM 11/14/2023     2:14 PM 11/17/2023     8:34 AM 1/3/2024     9:14 AM   PHQ-9 SCORE   PHQ-9 Total Score MyChart 12 (Moderate depression)  10 (Moderate depression) 13 (Moderate depression) 16 (Moderately severe depression)  21 (Severe depression)   PHQ-9 Total Score 12 15 10 13    13 16 15 21     GAD7:       8/29/2021     1:00 AM 3/16/2022     2:35 PM 8/12/2022    11:50 AM 3/9/2023    10:02 AM 7/25/2023    11:00 AM 8/21/2023     8:35 PM 11/7/2023    12:49 PM   RUY-7 SCORE   Total Score  7 (mild anxiety)  10 (moderate anxiety)  10 (moderate anxiety) 11 (moderate anxiety)   Total Score 11 7 14 10 14 10 11     CAGE-AID:       8/29/2021    12:58 AM 8/29/2021     1:00 AM 8/21/2023     8:33 PM 1/5/2024     2:00 PM   CAGE-AID Total Score   Total Score 0 0 0 0   Total Score MyChart   0 (A total score of 2 or greater is considered clinically significant)      PROMIS 10-Global Health (all questions and answers " displayed):       8/12/2022    11:50 AM 8/21/2023     8:39 PM 10/26/2023     1:53 PM   PROMIS 10   In general, would you say your health is:  Fair Good   In general, would you say your quality of life is:  Good Good   In general, how would you rate your physical health?  Fair Good   In general, how would you rate your mental health, including your mood and your ability to think?  Fair Good   In general, how would you rate your satisfaction with your social activities and relationships?  Poor Good   In general, please rate how well you carry out your usual social activities and roles  Fair Good   To what extent are you able to carry out your everyday physical activities such as walking, climbing stairs, carrying groceries, or moving a chair?  A little Moderately   In the past 7 days, how often have you been bothered by emotional problems such as feeling anxious, depressed, or irritable?  Sometimes Often   In the past 7 days, how would you rate your fatigue on average?  Moderate Moderate   In the past 7 days, how would you rate your pain on average, where 0 means no pain, and 10 means worst imaginable pain?  10 5   In general, would you say your health is: 2 2 3   In general, would you say your quality of life is: 2 3 3   In general, how would you rate your physical health? 2 2 3   In general, how would you rate your mental health, including your mood and your ability to think? 3 2 3   In general, how would you rate your satisfaction with your social activities and relationships? 5 1 3   In general, please rate how well you carry out your usual social activities and roles. (This includes activities at home, at work and in your community, and responsibilities as a parent, child, spouse, employee, friend, etc.) 4 2 3   To what extent are you able to carry out your everyday physical activities such as walking, climbing stairs, carrying groceries, or moving a chair? 3 2 3   In the past 7 days, how often have you been  bothered by emotional problems such as feeling anxious, depressed, or irritable? 2 3 4   In the past 7 days, how would you rate your fatigue on average? 2 3 3   In the past 7 days, how would you rate your pain on average, where 0 means no pain, and 10 means worst imaginable pain? 5 10 5   Global Mental Health Score 14 9 11    11   Global Physical Health Score 12 8 12    12   PROMIS TOTAL - SUBSCORES 26 17 23    23     [place] Suicide Severity Rating Scale (Lifetime/Recent)      10/30/2015    11:00 AM 8/28/2021    11:15 PM 8/21/2023     8:37 PM 1/8/2024     4:00 PM   [place] Suicide Severity Rating (Lifetime/Recent)   Q1 Wish to be Dead (Lifetime)  Yes     Q2 Non-Specific Active Suicidal Thoughts (Lifetime)  Yes     Most Severe Ideation Rating (Lifetime)  3     Frequency (Lifetime)  4     Duration (Lifetime)  2     Controllability (Lifetime)  3     Protective Factors  (Lifetime)  1     Reasons for Ideation (Lifetime)  3     Do you have guns available to you? No      RETIRED: 1. Wish to be Dead (Recent)  No     RETIRED: 2. Non-Specific Active Suicidal Thoughts (Recent)  No     3. Active Suicidal Ideation with any Methods (Not Plan) Without Intent to Act (Lifetime)  Yes     RETIRED: 3. Active Suicidal Ideation with any Methods (Not Plan) Without Intent to Act (Recent)  No     RETIRE: 4. Active Suicidal Ideation with Some Intent to Act, Without Specific Plan (Lifetime)  No     4. Active Suicidal Ideation with Some Intent to Act, Without Specific Plan (Recent)  No     RETIRE: 5. Active Suicidal Ideation with Specific Plan and Intent (Lifetime)  No     RETIRED: 5. Active Suicidal Ideation with Specific Plan and Intent (Recent)  No     Most Severe Ideation Rating (Past Month)  NA     Frequency (Past Month)  NA     Duration (Past Month)  NA     Controllability (Past Month)  3     Protective Factors (Past Month)  1     Reasons for Ideation (Past Month)  3     Actual Attempt (Lifetime)  No     Actual Attempt (Past 3 Months)   No     Has subject engaged in non-suicidal self-injurious behavior? (Lifetime)  No     Has subject engaged in non-suicidal self-injurious behavior? (Past 3 Months)  No     Interrupted Attempts (Lifetime)  No     Interrupted Attempts (Past 3 Months)  No     Aborted or Self-Interrupted Attempt (Lifetime)  No     Aborted or Self-Interrupted Attempt (Past 3 Months)  No     Preparatory Acts or Behavior (Lifetime)  No     Preparatory Acts or Behavior (Past 3 Months)  No     Most Recent Attempt Actual Lethality Code  NA     Most Lethal Attempt Actual Lethality Code  NA     Initial/First Attempt Actual Lethality Code  5     Q1 Wish to be Dead (Lifetime)    Y   1. Wish to be Dead (Past 1 Month)   Y N   Q2 Non-Specific Active Suicidal Thoughts (Lifetime)    Y   2. Non-Specific Active Suicidal Thoughts (Past 1 Month)   N N   Calculated C-SSRS Risk Score (Lifetime/Recent)   Low Risk No Risk Indicated       Mental Status Assessment:  Appearance:   Appropriate   Eye Contact:   Good   Psychomotor Behavior: Normal   Attitude:   Cooperative  Pleasant  Orientation:   All  Speech     Rate / Production:  Normal/ Responsive     Volume:   Normal   Mood:    Irritable  Normal  Affect:    Labile   Thought Content:  Clear   Thought Form:  Coherent   Insight:    Fair       Safety Issues and Plan for Safety and Risk Management:     Patient denies current fears or concerns for personal safety.  Patient denies current or recent suicidal ideation or behaviors.  Patient denies current or recent homicidal ideation or behaviors.  Patient denies current or recent self injurious behavior or ideation.  Patient denies other safety concerns.  Recommended that patient call 911 or go to the local ED should there be a change in any of these risk factors.  Patient reports there are no firearms in the house.     Diagnostic Criteria:  Major Depressive Disorder  CRITERIA (A-C) REPRESENT A MAJOR DEPRESSIVE EPISODE - SELECT THESE CRITERIA  A) Recurrent  episode(s) - symptoms have been present during the same 2-week period and represent a change from previous functioning 5 or more symptoms (required for diagnosis)   - Depressed mood. Note: In children and adolescents, can be irritable mood.     - Diminished interest or pleasure in all, or almost all, activities.    - Disrupted sleep.    - Psychomotor activity retardation.    - Fatigue or loss of energy.    - Feelings of worthlessness or inappropriate and excessive guilt.    - Diminished ability to think or concentrate, or indecisiveness.   B) The symptoms cause clinically significant distress or impairment in social, occupational, or other important areas of functioning  C) The episode is not attributable to the physiological effects of a substance or to another medical condition  D) The occurence of major depressive episode is not better explained by other thought / psychotic disorders  E) There has never been a manic episode or hypomanic episode    DSM5 Diagnoses: (Sustained by DSM5 Criteria Listed Above)  Diagnoses: 296.32 (F33.1) Major Depressive Disorder, Recurrent Episode, Moderate _  Psychosocial & Contextual Factors: holidays, conflict in marriage      Intervention:   Solution Focused Brief Therapy,Brief Psychotherapy - discussed and prioritizing the most efficient treatment. and Person-Centered Therapy - Actualizes potential and moves towards increased awareness, spontaneity, trust in self and inner directedness.    Collateral Reports Completed:  TC Collateral: [facility] electronic medical records reviewed.    DATA:  Interactive Complexity: No  Crisis: No    PLAN: (Homework, other):  Provider will continue Diagnostic Assessment. Initial Treatment will focus on: Depressed Mood - decrease symptoms .  2.   Provider recommended the following referrals: n/a .    3.   Information in this assessment was obtained from the medical record and provided by patient who is a good historian.   4.   Follow up  scheduled:  Pt requests her spouse participate in next appt.  He is agreeable to this.           Patient was given the following to do until next session:  Increase self care practices  5.  Anticipated Discharge: Anticipated Discharge Time: 1 - 3 Months     Procedure Code:  Psychotherapy (with patient) - 45 [CPT 79446]    Marianna Miles Psy.D, FRANCESCO    Suicide Risk and Safety Concerns were assessed for. Patient meets the following risk assessment and triage: Patient denied current / recent hx of suicidal ideation and  /or behaviors.  No safety plan indicated at this time.

## 2024-01-05 NOTE — PROGRESS NOTES
ANTICOAGULATION MANAGEMENT     Valentina Olmedo 63 year old female is on warfarin with therapeutic INR result. (Goal INR 2.0-3.0)    Recent labs: (last 7 days)     01/05/24  1400   INR 2.6*       ASSESSMENT     Source(s): Chart Review and Home Care/Facility Nurse     Warfarin doses taken: Warfarin taken as instructed  Diet: No new diet changes identified  Medication/supplement changes:  Diflucan started on 1/3/24 will be taking every 3 days x 3 doses subsequent INRs may be increased. Closer INR monitoring recommended.  New illness, injury, or hospitalization: Yes: skin infection  Signs or symptoms of bleeding or clotting: No  Previous result: Therapeutic last 2(+) visits  Additional findings: None       PLAN     Recommended plan for temporary change(s) affecting INR     Dosing Instructions: Continue your current warfarin dose with next INR in 4 days       Summary  As of 1/5/2024      Full warfarin instructions:  7.5 mg every Wed; 10 mg all other days   Next INR check:  1/9/2024               Telephone call with Katelyn  home care nurse who verbalizes understanding and agrees to plan and who agrees to plan and repeated back plan correctly    Orders given to  Homecare nurse/facility to recheck    Education provided:   Interaction IS anticipated between warfarin and Diflucan    Plan made with [facility] Pharmacist Paula Maria RN  Anticoagulation Clinic  1/5/2024    _______________________________________________________________________     Anticoagulation Episode Summary       Current INR goal:  2.0-3.0   TTR:  58.7% (1 y)   Target end date:  Indefinite   Send INR reminders to:  [facility]    Indications    History of pulmonary embolism (Resolved) [Z86.711]  Pulmonary embolism with infarction (H) [I26.99]  Long term (current) use of anticoagulants [Z79.01]  History of pulmonary embolism [Z86.711]             Comments:               Anticoagulation Care Providers       Provider Role Specialty Phone  number    Promise Vanegas MD Referring Family Medicine [phone]    Donald Rooney MD Referring Internal Medicine [phone]    Jose Maria Morin MD Referring Family Medicine [phone]

## 2024-01-08 ENCOUNTER — NURSE TRIAGE (OUTPATIENT)
Dept: NURSING | Facility: CLINIC | Age: 64
End: 2024-01-08
Payer: COMMERCIAL

## 2024-01-08 ASSESSMENT — COLUMBIA-SUICIDE SEVERITY RATING SCALE - C-SSRS
1. HAVE YOU WISHED YOU WERE DEAD OR WISHED YOU COULD GO TO SLEEP AND NOT WAKE UP?: YES
2. HAVE YOU ACTUALLY HAD ANY THOUGHTS OF KILLING YOURSELF?: NO
2. HAVE YOU ACTUALLY HAD ANY THOUGHTS OF KILLING YOURSELF?: YES
1. IN THE PAST MONTH, HAVE YOU WISHED YOU WERE DEAD OR WISHED YOU COULD GO TO SLEEP AND NOT WAKE UP?: NO

## 2024-01-08 NOTE — TELEPHONE ENCOUNTER
"Caller:   patient    Situation:   C/o dizziness and vertigo x 2 days  Dizziness is worse today  Can't walk without assistance  Denies having a fever but has been sweating \"all day today\"    C/o blurriness - this started a couple weeks ago       Background:  Shortness of breath started \"a longer time ago\"  Uses inhaler      Assessment:  Second level triage required for severe dizziness        Recommendation:  Disposition: Provider to make recommendation re: ED visit today.    Reviewed care advise with caller.   Informed to call back w/ any questions or new concerns.    Patient verbalized understanding of care advice.        Rojas Tavarez RN, BSN  Triage Nurse Advisor      Reason for Disposition   SEVERE dizziness (e.g., unable to stand, requires support to walk, feels like passing out now)    Additional Information   Negative: SEVERE difficulty breathing (e.g., struggling for each breath, speaks in single words)   Negative: [1] Difficulty breathing or swallowing AND [2] started suddenly after medicine, an allergic food or bee sting   Negative: Shock suspected (e.g., cold/pale/clammy skin, too weak to stand, low BP, rapid pulse)   Negative: Difficult to awaken or acting confused (e.g., disoriented, slurred speech)   Negative: [1] Weakness (i.e., paralysis, loss of muscle strength) of the face, arm or leg on one side of the body AND [2] sudden onset AND [3] present now   Negative: [1] Numbness (i.e., loss of sensation) of the face, arm or leg on one side of the body AND [2] sudden onset AND [3] present now   Negative: [1] Loss of speech or garbled speech AND [2] sudden onset AND [3] present now   Negative: Overdose (accidental or intentional) of medications   Negative: [1] Fainted > 15 minutes ago AND [2] still feels too weak or dizzy to stand   Negative: Heart beating < 50 beats per minute OR > 140 beats per minute   Negative: Sounds like a life-threatening emergency to the triager   Negative: Difficulty " breathing    Protocols used: Dizziness - [id number]

## 2024-01-08 NOTE — TELEPHONE ENCOUNTER
"Called Valentina and relayed Dr Rocha recommendation of : \"  Due to the potential risk of this being a stroke, I would recommend he go directly to the emergency room.   \"    Valentina will go to the ER and her  will drive her.    "

## 2024-01-09 ENCOUNTER — ANTICOAGULATION THERAPY VISIT (OUTPATIENT)
Dept: ANTICOAGULATION | Facility: CLINIC | Age: 64
End: 2024-01-09
Payer: COMMERCIAL

## 2024-01-09 ENCOUNTER — TELEPHONE (OUTPATIENT)
Dept: INTERNAL MEDICINE | Facility: CLINIC | Age: 64
End: 2024-01-09
Payer: COMMERCIAL

## 2024-01-09 ENCOUNTER — TELEPHONE (OUTPATIENT)
Dept: FAMILY MEDICINE | Facility: CLINIC | Age: 64
End: 2024-01-09
Payer: COMMERCIAL

## 2024-01-09 DIAGNOSIS — Z86.711 HISTORY OF PULMONARY EMBOLISM: ICD-10-CM

## 2024-01-09 DIAGNOSIS — Z79.01 LONG TERM (CURRENT) USE OF ANTICOAGULANTS: ICD-10-CM

## 2024-01-09 DIAGNOSIS — I26.99 PULMONARY EMBOLISM WITH INFARCTION (H): Primary | ICD-10-CM

## 2024-01-09 LAB — INR (EXTERNAL): 3.2 (ref 0.9–1.1)

## 2024-01-09 NOTE — TELEPHONE ENCOUNTER
I do not know why meclizine is not on her med list since I am not managing this medication and did not address it on the visit in November. If her PCP thinks she needs this medication, she should take it.  Very often, [facility] discontinues the medication during the rooming process if the patient says that is not taking it.

## 2024-01-09 NOTE — TELEPHONE ENCOUNTER
Pt was prescribed Meclizine 25 mg at the ER to help with her dizziness. She wanted to let you know she received a month supply. And it seems to be helping

## 2024-01-09 NOTE — TELEPHONE ENCOUNTER
Outgoing call, relayed provider message.     Pt was in ED yesterday and was prescribed Meclizine.   Pt have been contacting her pcp.   No further questions at this time.     Babatunde BAUER RN

## 2024-01-09 NOTE — TELEPHONE ENCOUNTER
"S-(situation): question regarding Meclizine that was discontinued on LOV.     B-(background): LOV 11/14/23     Medication was discontinue due to \"Patient not taking: Reported on 11/14/2023\" per note.     A-(assessment): Pt wondering why she wasn't taking this medication anymore for her dizziness.     R-(recommendations): Routed to provider to advise.      "

## 2024-01-09 NOTE — PROGRESS NOTES
ANTICOAGULATION MANAGEMENT     Valentina Olmedo 63 year old female is on warfarin with supratherapeutic INR result. (Goal INR 2.0-3.0)    Recent labs: (last 7 days)     01/09/24  1434   INR 3.2*       ASSESSMENT     Source(s): Chart Review and Home Care/Facility Nurse     Warfarin doses taken: Warfarin taken as instructed  Diet: No new diet changes identified  Medication/supplement changes:  fluconazozle 1/3-1/10  New illness, injury, or hospitalization: No  Signs or symptoms of bleeding or clotting: No  Previous result: Therapeutic last 2(+) visits  Additional findings: None       PLAN     Recommended plan for temporary change(s) affecting INR     Dosing Instructions: decrease dose, 5mg x2 then 7.5mg with next INR in 3 days     Summary  As of 1/9/2024      Full warfarin instructions:  1/9: 7.5 mg; Otherwise 7.5 mg every Wed; 10 mg all other days   Next INR check:  1/12/2024               Telephone call with Katelyn home care nurse who verbalizes understanding and agrees to plan and who agrees to plan and repeated back plan correctly  Detailed voice message left for Valentina with dosing instructions and follow up date & mychart sent    Orders given to  Homecare nurse/facility to recheck    Education provided:   Please call back if any changes to your diet, medications or how you've been taking warfarin    Plan made with [facility] Pharmacist Paula Naranjo, [id number]  Anticoagulation Clinic  1/9/2024    _______________________________________________________________________     Anticoagulation Episode Summary       Current INR goal:  2.0-3.0   TTR:  58.6% (1 y)   Target end date:  Indefinite   Send INR reminders to:  [facility]    Indications    History of pulmonary embolism (Resolved) [Z86.711]  Pulmonary embolism with infarction (H) [I26.99]  Long term (current) use of anticoagulants [Z79.01]  History of pulmonary embolism [Z86.711]             Comments:               Anticoagulation Care Providers       Provider  Role Specialty Phone number    Promise Vanegas MD Referring Family Medicine [phone]    Donald Rooney MD Referring Internal Medicine [phone]    Jose Maria Morin MD Referring Family Medicine [phone]

## 2024-01-09 NOTE — TELEPHONE ENCOUNTER
Medication Question or Refill    What medication are you calling about (include dose and sig)?: Meclizine     [place]  [facility] [place] [address]  [address]  [place] [address]  Phone: [phone] Fax: [phone]      Controlled Substance Agreement on file:   CSA -- Patient Level:    CSA: None found at the patient level.       Who prescribed the medication?:     Do you need a refill? No    Do you have any questions or concerns?  Yes she was wondering why she wasn't taking this medication anymore for her dizziness.      Could we send this information to you in [place] or would you prefer to receive a phone call?:   Patient would prefer a phone call   Okay to leave a detailed message?: Yes at Cell number on file:    Telephone Information:   Mobile [phone]

## 2024-01-09 NOTE — TELEPHONE ENCOUNTER
Calling to let Dr. 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 She is having surgery on 2/13/24 for it.

## 2024-01-11 ENCOUNTER — MEDICAL CORRESPONDENCE (OUTPATIENT)
Dept: HEALTH INFORMATION MANAGEMENT | Facility: CLINIC | Age: 64
End: 2024-01-11
Payer: COMMERCIAL

## 2024-01-12 ENCOUNTER — MEDICAL CORRESPONDENCE (OUTPATIENT)
Dept: HEALTH INFORMATION MANAGEMENT | Facility: CLINIC | Age: 64
End: 2024-01-12
Payer: COMMERCIAL

## 2024-01-12 ENCOUNTER — ANTICOAGULATION THERAPY VISIT (OUTPATIENT)
Dept: ANTICOAGULATION | Facility: CLINIC | Age: 64
End: 2024-01-12
Payer: COMMERCIAL

## 2024-01-12 DIAGNOSIS — I26.99 PULMONARY EMBOLISM WITH INFARCTION (H): Primary | ICD-10-CM

## 2024-01-12 DIAGNOSIS — Z79.01 LONG TERM (CURRENT) USE OF ANTICOAGULANTS: ICD-10-CM

## 2024-01-12 DIAGNOSIS — Z86.711 HISTORY OF PULMONARY EMBOLISM: ICD-10-CM

## 2024-01-12 LAB — INR (EXTERNAL): 2.5 (ref 0.9–1.1)

## 2024-01-12 NOTE — PROGRESS NOTES
ANTICOAGULATION MANAGEMENT     Valentina Olmedo 63 year old female is on warfarin with therapeutic INR result. (Goal INR 2.0-3.0)    Recent labs: (last 7 days)     01/12/24  0000   INR 2.5*       ASSESSMENT     Source(s): Chart Review and Home Care/Facility Nurse Katelyn, -[id number]    Warfarin doses taken: Warfarin taken as instructed  Diet: No new diet changes identified  Medication/supplement changes:  finished up fluconazole on Wed 1/10 this week   New illness, injury, or hospitalization: No  Signs or symptoms of bleeding or clotting: No  Previous result: Supratherapeutic  Additional findings: None       PLAN     Recommended plan for no diet, medication or health factor changes affecting INR     Dosing Instructions: Continue your current warfarin dose with next INR in 5 days       Summary  As of 1/12/2024      Full warfarin instructions:  7.5 mg every Wed; 10 mg all other days   Next INR check:  1/17/2024               Telephone call with Katelyn home care nurse who verbalizes understanding and agrees to plan    Orders given to  Homecare nurse/facility to recheck    Education provided:   Please call back if any changes to your diet, medications or how you've been taking warfarin  Goal range and lab monitoring: goal range and significance of current result, Importance of therapeutic range, and Importance of following up at instructed interval    Plan made per [facility] anticoagulation protocol    Tosin Singh, RN  Anticoagulation Clinic  1/12/2024    _______________________________________________________________________     Anticoagulation Episode Summary       Current INR goal:  2.0-3.0   TTR:  58.3% (1 y)   Target end date:  Indefinite   Send INR reminders to:  [facility]    Indications    History of pulmonary embolism (Resolved) [Z86.711]  Pulmonary embolism with infarction (H) [I26.99]  Long term (current) use of anticoagulants [Z79.01]  History of pulmonary embolism [Z86.711]             Comments:                Anticoagulation Care Providers       Provider Role Specialty Phone number    Promise Vanegas MD Referring Family Medicine [phone]    Donald Rooney MD Referring Internal Medicine [phone]    Jose Maria Morin MD Referring Family Medicine [phone]

## 2024-01-14 ENCOUNTER — HEALTH MAINTENANCE LETTER (OUTPATIENT)
Age: 64
End: 2024-01-14

## 2024-01-15 ENCOUNTER — TELEPHONE (OUTPATIENT)
Dept: BEHAVIORAL HEALTH | Facility: CLINIC | Age: 64
End: 2024-01-15
Payer: COMMERCIAL

## 2024-01-15 NOTE — TELEPHONE ENCOUNTER
Writer spoke with patient and rescheduled appointment as requested for 01/23/2024 @ 11:30 am.    Anabella Boyd  01/15/2024  1045

## 2024-01-17 ENCOUNTER — ANTICOAGULATION THERAPY VISIT (OUTPATIENT)
Dept: ANTICOAGULATION | Facility: CLINIC | Age: 64
End: 2024-01-17
Payer: COMMERCIAL

## 2024-01-17 ENCOUNTER — MEDICAL CORRESPONDENCE (OUTPATIENT)
Dept: HEALTH INFORMATION MANAGEMENT | Facility: CLINIC | Age: 64
End: 2024-01-17
Payer: COMMERCIAL

## 2024-01-17 DIAGNOSIS — I26.99 PULMONARY EMBOLISM WITH INFARCTION (H): Primary | ICD-10-CM

## 2024-01-17 DIAGNOSIS — Z86.711 HISTORY OF PULMONARY EMBOLISM: ICD-10-CM

## 2024-01-17 DIAGNOSIS — Z79.01 LONG TERM (CURRENT) USE OF ANTICOAGULANTS: ICD-10-CM

## 2024-01-17 LAB — INR (EXTERNAL): 2.5 (ref 0.9–1.1)

## 2024-01-17 NOTE — PROGRESS NOTES
ANTICOAGULATION MANAGEMENT     Valentina Olmedo 63 year old female is on warfarin with therapeutic INR result. (Goal INR 2.0-3.0)    Recent labs: (last 7 days)     01/17/24  1430   INR 2.5*       ASSESSMENT     Source(s): Chart Review and Home Care/Facility Nurse     Warfarin doses taken: Warfarin taken as instructed  Diet: No new diet changes identified  Medication/supplement changes: None noted  New illness, injury, or hospitalization: No  Signs or symptoms of bleeding or clotting: No  Previous result: Therapeutic last visit; previously outside of goal range  Additional findings: None       PLAN     Recommended plan for no diet, medication or health factor changes affecting INR     Dosing Instructions: Continue your current warfarin dose with next INR in 1 week       Summary  As of 1/17/2024      Full warfarin instructions:  7.5 mg every Wed; 10 mg all other days   Next INR check:  1/24/2024               Telephone call with Katelyn home care nurse who verbalizes understanding and agrees to plan    Orders given to  Homecare nurse/facility to recheck    Education provided:   Please call back if any changes to your diet, medications or how you've been taking warfarin    Plan made per [facility] anticoagulation protocol    Kyleigh Jimenes RN  Anticoagulation Clinic  1/17/2024    _______________________________________________________________________     Anticoagulation Episode Summary       Current INR goal:  2.0-3.0   TTR:  58.4% (1 y)   Target end date:  Indefinite   Send INR reminders to:  [facility]    Indications    History of pulmonary embolism (Resolved) [Z86.711]  Pulmonary embolism with infarction (H) [I26.99]  Long term (current) use of anticoagulants [Z79.01]  History of pulmonary embolism [Z86.711]             Comments:               Anticoagulation Care Providers       Provider Role Specialty Phone number    Promise Vanegas MD Referring Family Medicine [phone]    Donald Rooney MD Referring  Internal Medicine [phone]    Jose Maria Morin MD Referring Family Medicine [phone]

## 2024-01-19 ENCOUNTER — TELEPHONE (OUTPATIENT)
Dept: PULMONOLOGY | Facility: CLINIC | Age: 64
End: 2024-01-19
Payer: COMMERCIAL

## 2024-01-19 ENCOUNTER — TELEPHONE (OUTPATIENT)
Dept: FAMILY MEDICINE | Facility: CLINIC | Age: 64
End: 2024-01-19
Payer: COMMERCIAL

## 2024-01-19 NOTE — TELEPHONE ENCOUNTER
Reason for Call:  Appointment Request    Patient requesting this type of appt:  Office Visit    Requested provider: Jose Maria Morin    Reason patient unable to be scheduled: Not within requested timeframe    When does patient want to be seen/preferred time:  Any time    Comments: Would like to be seen ASAP to have forms filled out need therapy for speech want to be put on wait list     Okay to leave a detailed message?: Yes at Cell number on file:    Telephone Information:   Mobile [phone]       Call taken on 1/19/2024 at 3:08 PM by Sadie Lopez

## 2024-01-19 NOTE — TELEPHONE ENCOUNTER
Left detailed voice message as requested. Advised Valentina to try the nicotine patched, they are available over the counter and to call the nurse line back. Will send this message to Dr. Mallory and Herlinda Henderson CNP as they have both seen this patient before.

## 2024-01-19 NOTE — TELEPHONE ENCOUNTER
[facility] Call Center    Phone Message    May a detailed message be left on voicemail: yes     Reason for Call: Other: Valentina stated she was on nicotrol inhaler but the pharmacy no longer caries it please contact patient to assist with other options     Action Taken: Other: Pulm    Travel Screening: Not Applicable

## 2024-01-21 NOTE — TELEPHONE ENCOUNTER
Telephone Encounter by Enedina Obrien HUC at 7/8/2021  8:36 AM     Author: Enedina Obrien HUC Service: -- Author Type: Coordinator    Filed: 7/8/2021  8:39 AM Encounter Date: 7/8/2021 Status: Signed    : Enedina Obrien HUC (Coordinator)       Reason for Call: Home care needs progress note from 7/1/21 signed by provider, and faxed to Fax [phone]    Detailed comments: this is to verify the need for PT/OT.    Phone Number Patient can be reached at: Other phone number:      Best Time: any    Can we leave a detailed message on this number?: Yes    Call taken on 7/8/2021 at 8:37 AM by Enedina Obrien          2 = A lot of assistance

## 2024-01-22 ENCOUNTER — TELEPHONE (OUTPATIENT)
Dept: ANTICOAGULATION | Facility: CLINIC | Age: 64
End: 2024-01-22
Payer: COMMERCIAL

## 2024-01-22 DIAGNOSIS — J30.2 SEASONAL ALLERGIC RHINITIS, UNSPECIFIED TRIGGER: ICD-10-CM

## 2024-01-22 DIAGNOSIS — F17.210 CIGARETTE NICOTINE DEPENDENCE: ICD-10-CM

## 2024-01-22 DIAGNOSIS — J44.9 CHRONIC OBSTRUCTIVE PULMONARY DISEASE, UNSPECIFIED COPD TYPE (H): ICD-10-CM

## 2024-01-22 RX ORDER — VARENICLINE TARTRATE 1 MG/1
TABLET, FILM COATED ORAL
Qty: 56 TABLET | Refills: 2 | Status: SHIPPED | OUTPATIENT
Start: 2024-01-22 | End: 2024-04-25

## 2024-01-22 RX ORDER — SALMETEROL XINAFOATE 50 MCG
BLISTER, WITH INHALATION DEVICE INHALATION
Qty: 60 EACH | Refills: 5 | Status: SHIPPED | OUTPATIENT
Start: 2024-01-22 | End: 2024-07-05

## 2024-01-22 RX ORDER — FEXOFENADINE HCL 180 MG/1
180 TABLET ORAL DAILY
Qty: 90 TABLET | Refills: 3 | Status: SHIPPED | OUTPATIENT
Start: 2024-01-22

## 2024-01-22 NOTE — TELEPHONE ENCOUNTER
Received a call from home care nurse regarding Valentina Olmedo requesting a verbal okay to check INR tomorrow versus scheduled on Wednesday.    Given okay as requested.

## 2024-01-23 ENCOUNTER — ANTICOAGULATION THERAPY VISIT (OUTPATIENT)
Dept: ANTICOAGULATION | Facility: CLINIC | Age: 64
End: 2024-01-23
Payer: COMMERCIAL

## 2024-01-23 DIAGNOSIS — Z79.01 LONG TERM (CURRENT) USE OF ANTICOAGULANTS: ICD-10-CM

## 2024-01-23 DIAGNOSIS — Z86.711 HISTORY OF PULMONARY EMBOLISM: ICD-10-CM

## 2024-01-23 DIAGNOSIS — I26.99 PULMONARY EMBOLISM WITH INFARCTION (H): Primary | ICD-10-CM

## 2024-01-23 LAB — INR (EXTERNAL): 2.7 (ref 0.9–1.1)

## 2024-01-23 NOTE — PROGRESS NOTES
ANTICOAGULATION MANAGEMENT     Valentina Olmedo 63 year old female is on warfarin with therapeutic INR result. (Goal INR 2.0-3.0)    Recent labs: (last 7 days)     01/23/24  1436   INR 2.7*       ASSESSMENT     Source(s): Chart Review and Home Care/Facility Nurse     Warfarin doses taken: Warfarin taken as instructed  Diet: No new diet changes identified  Medication/supplement changes: None noted  New illness, injury, or hospitalization: No  Signs or symptoms of bleeding or clotting: No  Previous result: Therapeutic last 2(+) visits  Additional findings: None       PLAN     Recommended plan for no diet, medication or health factor changes affecting INR     Dosing Instructions: Continue your current warfarin dose with next INR in 2 weeks       Summary  As of 1/23/2024      Full warfarin instructions:  7.5 mg every Wed; 10 mg all other days   Next INR check:  2/7/2024               Telephone call with [facility] nurse who verbalizes understanding and agrees to plan    Patient to recheck with home meter    Education provided:   Please call back if any changes to your diet, medications or how you've been taking warfarin  Symptom monitoring: monitoring for bleeding signs and symptoms, monitoring for clotting signs and symptoms, and monitoring for stroke signs and symptoms  Contact [phone]  with any changes, questions or concerns.     Plan made per [facility] anticoagulation protocol    Zari Cheng RN  Anticoagulation Clinic  1/23/2024    _______________________________________________________________________     Anticoagulation Episode Summary       Current INR goal:  2.0-3.0   TTR:  58.4% (1 y)   Target end date:  Indefinite   Send INR reminders to:  [facility]    Indications    History of pulmonary embolism (Resolved) [Z86.711]  Pulmonary embolism with infarction (H) [I26.99]  Long term (current) use of anticoagulants [Z79.01]  History of pulmonary embolism [Z86.711]             Comments:                Anticoagulation Care Providers       Provider Role Specialty Phone number    Promise Vanegas MD Referring Family Medicine [phone]    Donald Rooney MD Referring Internal Medicine [phone]    Jose Maria Morin MD Referring Family Medicine [phone]

## 2024-01-24 ENCOUNTER — MEDICAL CORRESPONDENCE (OUTPATIENT)
Dept: HEALTH INFORMATION MANAGEMENT | Facility: CLINIC | Age: 64
End: 2024-01-24
Payer: COMMERCIAL

## 2024-01-25 ENCOUNTER — MEDICAL CORRESPONDENCE (OUTPATIENT)
Dept: HEALTH INFORMATION MANAGEMENT | Facility: CLINIC | Age: 64
End: 2024-01-25
Payer: COMMERCIAL

## 2024-01-30 ENCOUNTER — OFFICE VISIT (OUTPATIENT)
Dept: FAMILY MEDICINE | Facility: CLINIC | Age: 64
End: 2024-01-30
Payer: COMMERCIAL

## 2024-01-30 ENCOUNTER — TELEPHONE (OUTPATIENT)
Dept: FAMILY MEDICINE | Facility: CLINIC | Age: 64
End: 2024-01-30

## 2024-01-30 VITALS
RESPIRATION RATE: 20 BRPM | WEIGHT: 293 LBS | TEMPERATURE: 98.1 F | HEIGHT: 67 IN | BODY MASS INDEX: 45.99 KG/M2 | OXYGEN SATURATION: 95 % | HEART RATE: 96 BPM | SYSTOLIC BLOOD PRESSURE: 124 MMHG | DIASTOLIC BLOOD PRESSURE: 67 MMHG

## 2024-01-30 DIAGNOSIS — G62.9 PERIPHERAL POLYNEUROPATHY: ICD-10-CM

## 2024-01-30 DIAGNOSIS — E66.813 CLASS 3 SEVERE OBESITY DUE TO EXCESS CALORIES WITH SERIOUS COMORBIDITY AND BODY MASS INDEX (BMI) OF 50.0 TO 59.9 IN ADULT (H): ICD-10-CM

## 2024-01-30 DIAGNOSIS — F25.1 SCHIZOAFFECTIVE DISORDER, DEPRESSIVE TYPE (H): ICD-10-CM

## 2024-01-30 DIAGNOSIS — F33.1 MAJOR DEPRESSIVE DISORDER, RECURRENT EPISODE, MODERATE (H): ICD-10-CM

## 2024-01-30 DIAGNOSIS — E66.01 CLASS 3 SEVERE OBESITY DUE TO EXCESS CALORIES WITH SERIOUS COMORBIDITY AND BODY MASS INDEX (BMI) OF 50.0 TO 59.9 IN ADULT (H): ICD-10-CM

## 2024-01-30 DIAGNOSIS — Z86.711 HISTORY OF PULMONARY EMBOLISM: ICD-10-CM

## 2024-01-30 DIAGNOSIS — Z79.01 LONG TERM (CURRENT) USE OF ANTICOAGULANTS: ICD-10-CM

## 2024-01-30 DIAGNOSIS — Z13.228 SCREENING FOR ENDOCRINE, NUTRITIONAL, METABOLIC AND IMMUNITY DISORDER: ICD-10-CM

## 2024-01-30 DIAGNOSIS — G40.909 NONINTRACTABLE EPILEPSY WITHOUT STATUS EPILEPTICUS, UNSPECIFIED EPILEPSY TYPE (H): ICD-10-CM

## 2024-01-30 DIAGNOSIS — I26.99 PULMONARY EMBOLISM WITH INFARCTION (H): ICD-10-CM

## 2024-01-30 DIAGNOSIS — Z23 NEED FOR SHINGLES VACCINE: ICD-10-CM

## 2024-01-30 DIAGNOSIS — Z13.29 SCREENING FOR ENDOCRINE, NUTRITIONAL, METABOLIC AND IMMUNITY DISORDER: ICD-10-CM

## 2024-01-30 DIAGNOSIS — Z13.0 SCREENING FOR ENDOCRINE, NUTRITIONAL, METABOLIC AND IMMUNITY DISORDER: ICD-10-CM

## 2024-01-30 DIAGNOSIS — J44.9 CHRONIC OBSTRUCTIVE PULMONARY DISEASE, UNSPECIFIED COPD TYPE (H): ICD-10-CM

## 2024-01-30 DIAGNOSIS — G62.0 POLYNEUROPATHY DUE TO DRUG (H): ICD-10-CM

## 2024-01-30 DIAGNOSIS — R73.09 ABNORMAL GLUCOSE: ICD-10-CM

## 2024-01-30 DIAGNOSIS — E78.5 HYPERLIPIDEMIA, UNSPECIFIED HYPERLIPIDEMIA TYPE: Primary | ICD-10-CM

## 2024-01-30 DIAGNOSIS — Z13.1 SCREENING FOR DIABETES MELLITUS: ICD-10-CM

## 2024-01-30 DIAGNOSIS — Z13.21 SCREENING FOR ENDOCRINE, NUTRITIONAL, METABOLIC AND IMMUNITY DISORDER: ICD-10-CM

## 2024-01-30 DIAGNOSIS — E27.8 ADRENAL MASS, LEFT (H): ICD-10-CM

## 2024-01-30 PROBLEM — N18.32 CHRONIC KIDNEY DISEASE, STAGE 3B (H): Status: RESOLVED | Noted: 2023-02-19 | Resolved: 2024-01-30

## 2024-01-30 LAB — HBA1C MFR BLD: 5.2 % (ref 0–5.6)

## 2024-01-30 PROCEDURE — 84439 ASSAY OF FREE THYROXINE: CPT | Performed by: FAMILY MEDICINE

## 2024-01-30 PROCEDURE — 84443 ASSAY THYROID STIM HORMONE: CPT | Performed by: FAMILY MEDICINE

## 2024-01-30 PROCEDURE — 36415 COLL VENOUS BLD VENIPUNCTURE: CPT | Performed by: FAMILY MEDICINE

## 2024-01-30 PROCEDURE — 99214 OFFICE O/P EST MOD 30 MIN: CPT | Performed by: FAMILY MEDICINE

## 2024-01-30 PROCEDURE — 80061 LIPID PANEL: CPT | Performed by: FAMILY MEDICINE

## 2024-01-30 PROCEDURE — 83036 HEMOGLOBIN GLYCOSYLATED A1C: CPT | Performed by: FAMILY MEDICINE

## 2024-01-30 PROCEDURE — 80053 COMPREHEN METABOLIC PANEL: CPT | Performed by: FAMILY MEDICINE

## 2024-01-30 NOTE — ASSESSMENT & PLAN NOTE
"The patient came to clinic today with request for speech therapy noting that she has been struggling with symptoms that are best described as word finding.  Interestingly, her symptoms loosely match up with a dose adjustment on topiramate.  In reviewing the notes from her psychiatrist, topiramate was prescribed to help with appetite and nutrition.  The patient has not reported these benefits.  This medication was not prescribed as part of her epilepsy plan.  I suspect that her word finding is actually a side effect from topiramate that became obvious when she went from 25 mg up to 50 mg.    Medical weight loss?  Unfortunately her Medicare insurance plan will be unlikely to cover any of the injectable weight loss medications.  Other medicines such as phentermine, phentermine/topiramate, naltrexone/bupropion are unlikely to be sufficiently potent and may undermine her overall mental health.    The patient asked me to complete paperwork through the county that would get her better resources for food.  She gets 1 meal per day through some community organization.  Otherwise, she has very little money left over to purchase additional food.  Paradoxically, she is \"starving\" even though she has been gaining weight.  I think she would do better with more resources.  Ideally, she is consuming protein throughout the day with fibrous vegetables.  When they find the form I am happy to complete that form.    Lastly, she notes that she has been struggling with movement as she has gained weight.  She wonders if she would benefit from a device that she saw on the Internet called an under desk elliptical machine.  I think it is a reasonable intervention as it is something that she can do while seated in her recliner.  I completed an order which she will give to her .  The patient believes that the  will be able to find resources to purchase this on her behalf.  "

## 2024-01-30 NOTE — ASSESSMENT & PLAN NOTE
Notes reviewed from December visit with epilepsy specialist.  Topiramate is not considered part of her antiseizure plan per that note.

## 2024-01-30 NOTE — LETTER
February 1, 2024      Valentina Olmedo  [address]   [place] [facility] [address]        Dear ,    We are writing to inform you of your test results.  The tests overall are stable.  The TSH implies that your thyroid replacement dosing might be a bit too high.  For now I think we should continue your current therapy and recheck in a few months.    Resulted Orders   Lipid panel reflex to direct LDL Non-fasting   Result Value Ref Range    Cholesterol 180 <200 mg/dL    Triglycerides 105 <150 mg/dL    Direct Measure HDL 83 >=50 mg/dL    LDL Cholesterol Calculated 76 <=100 mg/dL    Non HDL Cholesterol 97 <130 mg/dL    Patient Fasting > 8hrs? Yes     Narrative    Cholesterol  Desirable:  <200 mg/dL    Triglycerides  Normal:  Less than 150 mg/dL  Borderline High:  150-199 mg/dL  High:  200-499 mg/dL  Very High:  Greater than or equal to 500 mg/dL    Direct Measure HDL  Female:  Greater than or equal to 50 mg/dL   Male:  Greater than or equal to 40 mg/dL    LDL Cholesterol  Desirable:  <100mg/dL  Above Desirable:  100-129 mg/dL   Borderline High:  130-159 mg/dL   High:  160-189 mg/dL   Very High:  >= 190 mg/dL    Non HDL Cholesterol  Desirable:  130 mg/dL  Above Desirable:  130-159 mg/dL  Borderline High:  160-189 mg/dL  High:  190-219 mg/dL  Very High:  Greater than or equal to 220 mg/dL   Hemoglobin A1c   Result Value Ref Range    Hemoglobin A1C 5.2 0.0 - 5.6 %      Comment:      Normal <5.7%   Prediabetes 5.7-6.4%    Diabetes 6.5% or higher     Note: Adopted from ADA consensus guidelines.   Comprehensive metabolic panel (BMP + Alb, Alk Phos, ALT, AST, Total. Bili, TP)   Result Value Ref Range    Sodium 143 135 - 145 mmol/L      Comment:      Reference intervals for this test were updated on 09/26/2023 to more accurately reflect our healthy population. There may be differences in the flagging of prior results with similar values performed with this method. Interpretation of those prior results can be made  in the context of the updated reference intervals.     Potassium 4.0 3.4 - 5.3 mmol/L    Carbon Dioxide (CO2) 20 (L) 22 - 29 mmol/L    Anion Gap 10 7 - 15 mmol/L    Urea Nitrogen 14.1 8.0 - 23.0 mg/dL    Creatinine 0.66 0.51 - 0.95 mg/dL    GFR Estimate >90 >60 mL/min/1.73m2    Calcium 8.5 (L) 8.8 - 10.2 mg/dL    Chloride 113 (H) 98 - 107 mmol/L    Glucose 113 (H) 70 - 99 mg/dL    Alkaline Phosphatase 73 40 - 150 U/L      Comment:      Reference intervals for this test were updated on 11/14/2023 to more accurately reflect our healthy population. There may be differences in the flagging of prior results with similar values performed with this method. Interpretation of those prior results can be made in the context of the updated reference intervals.    AST 19 0 - 45 U/L      Comment:      Reference intervals for this test were updated on 6/12/2023 to more accurately reflect our healthy population. There may be differences in the flagging of prior results with similar values performed with this method. Interpretation of those prior results can be made in the context of the updated reference intervals.    ALT 23 0 - 50 U/L      Comment:      Reference intervals for this test were updated on 6/12/2023 to more accurately reflect our healthy population. There may be differences in the flagging of prior results with similar values performed with this method. Interpretation of those prior results can be made in the context of the updated reference intervals.      Protein Total 6.5 6.4 - 8.3 g/dL    Albumin 3.9 3.5 - 5.2 g/dL    Bilirubin Total 0.2 <=1.2 mg/dL   TSH with free T4 reflex   Result Value Ref Range    TSH 0.26 (L) 0.30 - 4.20 uIU/mL   T4 free   Result Value Ref Range    Free T4 1.30 0.90 - 1.70 ng/dL       If you have any questions or concerns, please call the clinic at the number listed above.       Sincerely,      Jose Maria Morin MD

## 2024-01-30 NOTE — PROGRESS NOTES
"  Assessment & Plan   Problem List Items Addressed This Visit       Adrenal mass, left (H24)     Stable.  No recommended follow-up.         Chronic obstructive pulmonary disease (H)     Breathing has not been stable.  Using inhaler currently.           Class 3 severe obesity due to excess calories with serious comorbidity and body mass index (BMI) of 50.0 to 59.9 in adult (H)     The patient came to clinic today with request for speech therapy noting that she has been struggling with symptoms that are best described as word finding.  Interestingly, her symptoms loosely match up with a dose adjustment on topiramate.  In reviewing the notes from her psychiatrist, topiramate was prescribed to help with appetite and nutrition.  The patient has not reported these benefits.  This medication was not prescribed as part of her epilepsy plan.  I suspect that her word finding is actually a side effect from topiramate that became obvious when she went from 25 mg up to 50 mg.    Medical weight loss?  Unfortunately her Medicare insurance plan will be unlikely to cover any of the injectable weight loss medications.  Other medicines such as phentermine, phentermine/topiramate, naltrexone/bupropion are unlikely to be sufficiently potent and may undermine her overall mental health.    The patient asked me to complete paperwork through the [facility] that would get her better resources for food.  She gets 1 meal per day through some community organization.  Otherwise, she has very little money left over to purchase additional food.  Paradoxically, she is \"starving\" even though she has been gaining weight.  I think she would do better with more resources.  Ideally, she is consuming protein throughout the day with fibrous vegetables.  When they find the form I am happy to complete that form.    Lastly, she notes that she has been struggling with movement as she has gained weight.  She wonders if she would benefit from a device that she saw " on the Internet called an under desk elliptical machine.  I think it is a reasonable intervention as it is something that she can do while seated in her recliner.  I completed an order which she will give to her .  The patient believes that the  will be able to find resources to purchase this on her behalf.         Relevant Orders    Miscellaneous Order for DME - ONLY FOR DME    History of pulmonary embolism    HLD (hyperlipidemia) - Primary    Relevant Orders    Lipid panel reflex to direct LDL Non-fasting    Long term (current) use of anticoagulants    Major depressive disorder, recurrent episode, moderate (H)     Managed through psychiatrist.  Stable. Has follow up appointment next month.          Nonintractable epilepsy without status epilepticus (H)     Notes reviewed from December visit with epilepsy specialist.  Topiramate is not considered part of her antiseizure plan per that note.         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  There may be some weight gain associated with these medications.  However, the patient reports that they are effective.  Continue current plan.         Pulmonary embolism with infarction (H)    Schizoaffective disorder, depressive type (H)     Mood reported as stable today.          Other Visit Diagnoses       Need for shingles vaccine        Screening for diabetes mellitus        Relevant Orders    Hemoglobin A1c    Comprehensive metabolic panel (BMP + Alb, Alk Phos, ALT, AST, Total. Bili, TP)    Screening for endocrine, nutritional, metabolic and immunity disorder        Relevant Orders    TSH with free T4 reflex    Abnormal glucose        Relevant Orders    Hemoglobin A1c             BMI  Estimated body mass index is 56.71 kg/m  as calculated from the following:    Height as of this encounter:  "1.689 m (5' 6.5\").    Weight as of this encounter: 161.8 kg (356 lb 11.2 oz).   Weight management plan: Discussed healthy diet and exercise guidelines      Magda Montgomery is a 63 year old, presenting for the following health issues:  Orders (Need orders for speech therapy) and Forms        1/30/2024     9:10 AM   Additional Questions   Roomed by xl   Accompanied by spouse     Nutrition:   - has form to complete for more assistance on her EBT card   - EBT card  only gets $23/momth   - they get 7 meals through [employer]   - \"I have been starving.\"     - this has been her reality for a long time.    - \"We need food.\"    Movement:   - limited movement.   Mobility is declining.   - wonders if she can get [employer].  \"I can't afford it.\"    -  can get it if she has a note from doctor.     - she has resistance bands that she tries to use.       Speech:   - struggling to form her words.  She has the word in her mind but cannot get it out.   - onset: about a year ago   - she is not sure what might have appreciated this condition.    - she associates this to her past brain surgeries for epilepsy.     History of Present Illness       Reason for visit:  Being abletoget speechtherapy and i want thhis machine for my feet like a bike to strength i am getting flabby and i cant walk much now because    She eats 0-1 servings of fruits and vegetables daily.She consumes 0 sweetened beverage(s) daily.She exercises with enough effort to increase her heart rate 9 or less minutes per day.  She exercises with enough effort to increase her heart rate 3 or less days per week.   She is taking medications regularly.         Objective    /67 (BP Location: Right arm, Patient Position: Sitting, Cuff Size: Adult Large)   Pulse 96   Temp 98.1  F (36.7  C) (Oral)   Resp 20   Ht 1.689 m (5' 6.5\")   Wt (!) 161.8 kg (356 lb 11.2 oz)   LMP  (LMP Unknown)   SpO2 95%   BMI 56.71 kg/m    Body mass index is 56.71 " kg/m .  Physical Exam  Nursing note reviewed.   Constitutional:       General: She is not in acute distress.     Appearance: Normal appearance. She is obese. She is not ill-appearing.   HENT:      Head: Normocephalic and atraumatic.   Eyes:      Extraocular Movements: Extraocular movements intact.      Conjunctiva/sclera: Conjunctivae normal.   Pulmonary:      Effort: Pulmonary effort is normal.   Neurological:      Mental Status: She is alert and oriented to person, place, and time.   Psychiatric:         Attention and Perception: Attention normal.         Mood and Affect: Mood normal.         Speech: Speech normal.         Thought Content: Thought content normal.                    Signed Electronically by: Jose Maria Morin MD

## 2024-01-30 NOTE — ASSESSMENT & PLAN NOTE
Currently on duloxetine and pregabalin and treatment of neuropathy.  There may be some weight gain associated with these medications.  However, the patient reports that they are effective.  Continue current plan.

## 2024-01-30 NOTE — Clinical Note
This patient is on your schedule for weight loss intake next week.  Can we chat a bit about her before that?

## 2024-01-31 LAB
ALBUMIN SERPL BCG-MCNC: 3.9 G/DL (ref 3.5–5.2)
ALP SERPL-CCNC: 73 U/L (ref 40–150)
ALT SERPL W P-5'-P-CCNC: 23 U/L (ref 0–50)
ANION GAP SERPL CALCULATED.3IONS-SCNC: 10 MMOL/L (ref 7–15)
AST SERPL W P-5'-P-CCNC: 19 U/L (ref 0–45)
BILIRUB SERPL-MCNC: 0.2 MG/DL
BUN SERPL-MCNC: 14.1 MG/DL (ref 8–23)
CALCIUM SERPL-MCNC: 8.5 MG/DL (ref 8.8–10.2)
CHLORIDE SERPL-SCNC: 113 MMOL/L (ref 98–107)
CHOLEST SERPL-MCNC: 180 MG/DL
CREAT SERPL-MCNC: 0.66 MG/DL (ref 0.51–0.95)
DEPRECATED HCO3 PLAS-SCNC: 20 MMOL/L (ref 22–29)
EGFRCR SERPLBLD CKD-EPI 2021: >90 ML/MIN/1.73M2
FASTING STATUS PATIENT QL REPORTED: YES
GLUCOSE SERPL-MCNC: 113 MG/DL (ref 70–99)
HDLC SERPL-MCNC: 83 MG/DL
LDLC SERPL CALC-MCNC: 76 MG/DL
NONHDLC SERPL-MCNC: 97 MG/DL
POTASSIUM SERPL-SCNC: 4 MMOL/L (ref 3.4–5.3)
PROT SERPL-MCNC: 6.5 G/DL (ref 6.4–8.3)
SODIUM SERPL-SCNC: 143 MMOL/L (ref 135–145)
T4 FREE SERPL-MCNC: 1.3 NG/DL (ref 0.9–1.7)
TRIGL SERPL-MCNC: 105 MG/DL
TSH SERPL DL<=0.005 MIU/L-ACNC: 0.26 UIU/ML (ref 0.3–4.2)

## 2024-02-01 DIAGNOSIS — I10 ESSENTIAL HYPERTENSION: ICD-10-CM

## 2024-02-01 RX ORDER — LOSARTAN POTASSIUM 25 MG/1
12.5 TABLET ORAL DAILY
Qty: 90 TABLET | Refills: 1 | Status: SHIPPED | OUTPATIENT
Start: 2024-02-01 | End: 2024-07-09

## 2024-02-01 NOTE — TELEPHONE ENCOUNTER
Mail order pharmacy calling stating refill needed from PCP today to fill pill packs for patient they do not have enough to complete order.  Please review and send if agreed

## 2024-02-06 ENCOUNTER — OFFICE VISIT (OUTPATIENT)
Dept: FAMILY MEDICINE | Facility: CLINIC | Age: 64
End: 2024-02-06
Attending: SURGERY
Payer: COMMERCIAL

## 2024-02-06 VITALS
WEIGHT: 293 LBS | RESPIRATION RATE: 20 BRPM | OXYGEN SATURATION: 96 % | BODY MASS INDEX: 47.09 KG/M2 | HEART RATE: 80 BPM | DIASTOLIC BLOOD PRESSURE: 79 MMHG | TEMPERATURE: 98.3 F | SYSTOLIC BLOOD PRESSURE: 125 MMHG | HEIGHT: 66 IN

## 2024-02-06 DIAGNOSIS — Z72.0 TOBACCO ABUSE: ICD-10-CM

## 2024-02-06 DIAGNOSIS — F43.10 PTSD (POST-TRAUMATIC STRESS DISORDER): ICD-10-CM

## 2024-02-06 DIAGNOSIS — E66.01 CLASS 3 SEVERE OBESITY DUE TO EXCESS CALORIES WITH SERIOUS COMORBIDITY AND BODY MASS INDEX (BMI) OF 50.0 TO 59.9 IN ADULT (H): ICD-10-CM

## 2024-02-06 DIAGNOSIS — F33.1 MAJOR DEPRESSIVE DISORDER, RECURRENT EPISODE, MODERATE (H): Primary | ICD-10-CM

## 2024-02-06 DIAGNOSIS — E66.813 CLASS 3 SEVERE OBESITY DUE TO EXCESS CALORIES WITH SERIOUS COMORBIDITY AND BODY MASS INDEX (BMI) OF 50.0 TO 59.9 IN ADULT (H): ICD-10-CM

## 2024-02-06 DIAGNOSIS — F25.1 SCHIZOAFFECTIVE DISORDER, DEPRESSIVE TYPE (H): ICD-10-CM

## 2024-02-06 DIAGNOSIS — Z85.850 HISTORY OF THYROID CANCER: ICD-10-CM

## 2024-02-06 DIAGNOSIS — F33.8 SEASONAL AFFECTIVE DISORDER (H): ICD-10-CM

## 2024-02-06 DIAGNOSIS — E03.9 ACQUIRED HYPOTHYROIDISM: ICD-10-CM

## 2024-02-06 DIAGNOSIS — E66.01 CLASS 3 SEVERE OBESITY WITH BODY MASS INDEX (BMI) OF 50.0 TO 59.9 IN ADULT, UNSPECIFIED OBESITY TYPE, UNSPECIFIED WHETHER SERIOUS COMORBIDITY PRESENT (H): ICD-10-CM

## 2024-02-06 DIAGNOSIS — G47.33 OSA (OBSTRUCTIVE SLEEP APNEA): ICD-10-CM

## 2024-02-06 DIAGNOSIS — E66.813 CLASS 3 SEVERE OBESITY WITH BODY MASS INDEX (BMI) OF 50.0 TO 59.9 IN ADULT, UNSPECIFIED OBESITY TYPE, UNSPECIFIED WHETHER SERIOUS COMORBIDITY PRESENT (H): ICD-10-CM

## 2024-02-06 PROBLEM — Z86.711 HX PULMONARY EMBOLISM: Status: ACTIVE | Noted: 2023-02-07

## 2024-02-06 PROCEDURE — 99215 OFFICE O/P EST HI 40 MIN: CPT | Performed by: FAMILY MEDICINE

## 2024-02-06 ASSESSMENT — ANXIETY QUESTIONNAIRES
3. WORRYING TOO MUCH ABOUT DIFFERENT THINGS: MORE THAN HALF THE DAYS
GAD7 TOTAL SCORE: 12
8. IF YOU CHECKED OFF ANY PROBLEMS, HOW DIFFICULT HAVE THESE MADE IT FOR YOU TO DO YOUR WORK, TAKE CARE OF THINGS AT HOME, OR GET ALONG WITH OTHER PEOPLE?: SOMEWHAT DIFFICULT
GAD7 TOTAL SCORE: 12
1. FEELING NERVOUS, ANXIOUS, OR ON EDGE: SEVERAL DAYS
7. FEELING AFRAID AS IF SOMETHING AWFUL MIGHT HAPPEN: NEARLY EVERY DAY
6. BECOMING EASILY ANNOYED OR IRRITABLE: MORE THAN HALF THE DAYS
GAD7 TOTAL SCORE: 12
2. NOT BEING ABLE TO STOP OR CONTROL WORRYING: MORE THAN HALF THE DAYS
IF YOU CHECKED OFF ANY PROBLEMS ON THIS QUESTIONNAIRE, HOW DIFFICULT HAVE THESE PROBLEMS MADE IT FOR YOU TO DO YOUR WORK, TAKE CARE OF THINGS AT HOME, OR GET ALONG WITH OTHER PEOPLE: SOMEWHAT DIFFICULT
4. TROUBLE RELAXING: SEVERAL DAYS
7. FEELING AFRAID AS IF SOMETHING AWFUL MIGHT HAPPEN: NEARLY EVERY DAY
5. BEING SO RESTLESS THAT IT IS HARD TO SIT STILL: SEVERAL DAYS

## 2024-02-06 ASSESSMENT — PATIENT HEALTH QUESTIONNAIRE - PHQ9
SUM OF ALL RESPONSES TO PHQ QUESTIONS 1-9: 13
SUM OF ALL RESPONSES TO PHQ QUESTIONS 1-9: 13
10. IF YOU CHECKED OFF ANY PROBLEMS, HOW DIFFICULT HAVE THESE PROBLEMS MADE IT FOR YOU TO DO YOUR WORK, TAKE CARE OF THINGS AT HOME, OR GET ALONG WITH OTHER PEOPLE: NOT DIFFICULT AT ALL
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
## 2024-02-06 NOTE — Clinical Note
"Hi Dr. Carias.  I saw this patient for weight loss.   She is not a candidate for weight loss meds:  Medications started today: glp 1 receptor agonists contraindicated in setting of hx thyroid cancer, stimulants and wellbutrin/ contrave contraindicated in setting of epilepsy, topamax caused brain fog and didn't work for weight loss, a1 is 5.2 so metformin not an option, orlistat contraindicated due to hx kidney stones.   She was assessed, I think, although I do not have clear notes in 2014/2015 for weight loss surgery but deemed not a candidate.   4/20/2015  \"This patient is a 55 y.o. female is being seen today for follow-up non-surgical nutritional evaluation.  Per Dr. Parker Iniguez pt is not a candidate for bariatric surgery. After meeting with Valentina these past 4 months I would have to agree.\"  However it has been nearly 10 years since this assessment was made, so will refer for another opinion from the bariatric surgery team.   Would there be any merit in reevaluating her?"

## 2024-02-06 NOTE — ASSESSMENT & PLAN NOTE
Thyroid managed by Dr. Starr. Looks like tsh is low, so expect he will lower his dose, she will discuss with Dr. Hubbard.

## 2024-02-06 NOTE — ASSESSMENT & PLAN NOTE
Mental health - Managed through psychiatrist.  She says she would never harm herself or others because she wants to live and she doesn't want to harm anyone right now. And she doesn't currently want to harm herself either.

## 2024-02-06 NOTE — ASSESSMENT & PLAN NOTE
"SEVERE OBESITY WITH BMI 57.82 AT INTAKE AND CHRONIC COMORBID WEIGHT RELATED CONDITIONS INCULDING: hx epilepsy, hx pulmonary embolism, sleep apnea, hypertension, hyperlipidemia, hypothyroidism, bilateral knee pain, hip pain, back pain, neck pain, hx thyroid cancer, hx melanoma, reflux, COPD, mental health challenges ptsd, schiozo affective disorder, depression    PATIENT IS BEGINNING ACTIVE MEDICALLY SUPERVISED WEIGHT LOSS STARTING AT Body mass index is 57.82 kg/m . - wants weight loss surgery    Contraception - menopause - she understands she cannot become pregnant on weight loss medications as they have generally, not been tested in pregnancy.     Patient declined 24 week weight loss program due to cost  Patient declined  3 pack health coaching due to cost.   We did discuss food supplements.     We discussed surgical weight loss options given her BMI is 57.82 and she  has the following weight related comorbid conditions: BUT IN CHART REVIEW IT SAYS SHE IS NOT A CANDIDATE 2015: 4/20/2015  \"This patient is a 55 y.o. female is being seen today for follow-up non-surgical nutritional evaluation.   Per Dr. Parker Iniguez pt is not a candidate for bariatric surgery. After meeting with Valentina these past 4 months I would have to agree.\"  However it has been nearly 10 years since this assessment was made, so will refer for another opinion from the bariatric surgery team.     Medications started today: glp 1 receptor agonists contraindicated in setting of hx thyroid cancer, stimulants and wellbutrin/ contrave contraindicated in setting of epilepsy, topamax caused brain fog and didn't work for weight loss, a1 is 5.2 so metformin not an option, orlistat contraindicated due to hx kidney stones.     Lab assessment plan:  tsh low defer to pcp to adjust as he ordered most recent tsh. Labs noted.     Follow up with bariatric surgery team.     DISCUSSION _________________________________________________________________    Dx: Initial bmi " is Body mass index is 57.82 kg/m .  With the following weight related comorbid medical conditions: hx epilepsy, hx pulmonary embolism, sleep apnea, hypertension, hyperlipidemia, hypothyroidism, bilateral knee pain, hip pain, back pain, neck pain, hx thyroid cancer, hx melanoma, reflux, COPD, mental health challenges ptsd, schiozoaffective disorder, depression    BECAUSE OF ABOVE PROBLEMS IT IS STRONGLY ADVISED THAT Valentina LOSE WEIGHT.  BELOW IS MY PLAN FOR her     Dr. Patience MCGEE,  is her primary care provider - who referred her here today for help with weight loss.    Start weight 358 lbs, Body mass index is 57.82 kg/m .  2/6/2024     Medication notes:   Medications she takes, that are known to increase weight: abilify, cymbalta, lyrica, seroquel    Zepbound, wegovy, saxenda, all glp1 receptor agonists- contraindicated setting thyroid cancer  Metformin - no indication A1c 5.2   Wellbutrin - contraindicated, can cause seizures   Naltrexone - avoid in setting of chronic pain and potential need for narcotics.   Contrave - contraindicated pt has epilepsy, avoid psychotropic med and she has chronic pain and could need narcotic.   Phentermine/ diethylproprion - contraindicated in seizures.  Topamax - added by psychiatrist for appetite suppression but she says it didn't work and she got brain fog.   Qsymia - avoid topamax caused brain fog.    Orlistat - contraindicated in setting of kidney stones.

## 2024-02-07 ENCOUNTER — ANTICOAGULATION THERAPY VISIT (OUTPATIENT)
Dept: ANTICOAGULATION | Facility: CLINIC | Age: 64
End: 2024-02-07
Payer: COMMERCIAL

## 2024-02-07 ENCOUNTER — MEDICAL CORRESPONDENCE (OUTPATIENT)
Dept: HEALTH INFORMATION MANAGEMENT | Facility: CLINIC | Age: 64
End: 2024-02-07
Payer: COMMERCIAL

## 2024-02-07 DIAGNOSIS — Z86.711 HISTORY OF PULMONARY EMBOLISM: ICD-10-CM

## 2024-02-07 DIAGNOSIS — I26.99 PULMONARY EMBOLISM WITH INFARCTION (H): Primary | ICD-10-CM

## 2024-02-07 DIAGNOSIS — Z79.01 LONG TERM (CURRENT) USE OF ANTICOAGULANTS: ICD-10-CM

## 2024-02-07 LAB — INR (EXTERNAL): 2 (ref 0.9–1.1)

## 2024-02-07 NOTE — PROGRESS NOTES
ANTICOAGULATION MANAGEMENT     Valentina Olmedo 63 year old female is on warfarin with therapeutic INR result. (Goal INR 2.0-3.0)    Recent labs: (last 7 days)     02/07/24  0000   INR 2.0*       ASSESSMENT     Source(s): Chart Review and Home Care/Facility Nurse     Warfarin doses taken: Warfarin taken as instructed  Diet: Increased greens/vitamin K in diet; plans to resume previous intake  Medication/supplement changes:  Topamax stopped on 2/6/24 No interaction anticipated  New illness, injury, or hospitalization: No  Signs or symptoms of bleeding or clotting: No  Previous result: Therapeutic last 2(+) visits  Additional findings: None       PLAN     Recommended plan for temporary change(s) affecting INR     Dosing Instructions: Continue your current warfarin dose with next INR in 2 weeks       Summary  As of 2/7/2024      Full warfarin instructions:  7.5 mg every Wed; 10 mg all other days   Next INR check:  2/21/2024               Telephone call with Valentina Dempsey home care nurse who verbalizes understanding and agrees to plan    Patient to recheck with home meter    Education provided:   Goal range and lab monitoring: goal range and significance of current result and Importance of therapeutic range  Dietary considerations: importance of consistent vitamin K intake    Plan made per [facility] anticoagulation protocol    Angus Vazquez, RN  Anticoagulation Clinic  2/7/2024    _______________________________________________________________________     Anticoagulation Episode Summary       Current INR goal:  2.0-3.0   TTR:  58.3% (1 y)   Target end date:  Indefinite   Send INR reminders to:  [facility]    Indications    History of pulmonary embolism (Resolved) [Z86.711]  Pulmonary embolism with infarction (H) [I26.99]  Long term (current) use of anticoagulants [Z79.01]  History of pulmonary embolism [Z86.711]             Comments:               Anticoagulation Care Providers       Provider Role Specialty Phone number     Promise Vanegas MD Referring Family Medicine [phone]    Donald Rooney MD Referring Internal Medicine [phone]    Jose Maria Morin MD Referring Family Medicine [phone]

## 2024-02-08 ENCOUNTER — TRANSFERRED RECORDS (OUTPATIENT)
Dept: HEALTH INFORMATION MANAGEMENT | Facility: CLINIC | Age: 64
End: 2024-02-08
Payer: COMMERCIAL

## 2024-02-09 ENCOUNTER — VIRTUAL VISIT (OUTPATIENT)
Dept: SURGERY | Facility: CLINIC | Age: 64
End: 2024-02-09
Payer: COMMERCIAL

## 2024-02-09 ENCOUNTER — MEDICAL CORRESPONDENCE (OUTPATIENT)
Dept: HEALTH INFORMATION MANAGEMENT | Facility: CLINIC | Age: 64
End: 2024-02-09

## 2024-02-09 DIAGNOSIS — E66.813 CLASS 3 SEVERE OBESITY DUE TO EXCESS CALORIES WITH SERIOUS COMORBIDITY AND BODY MASS INDEX (BMI) OF 50.0 TO 59.9 IN ADULT (H): Primary | ICD-10-CM

## 2024-02-09 DIAGNOSIS — E66.01 CLASS 3 SEVERE OBESITY DUE TO EXCESS CALORIES WITH SERIOUS COMORBIDITY AND BODY MASS INDEX (BMI) OF 50.0 TO 59.9 IN ADULT (H): Primary | ICD-10-CM

## 2024-02-09 DIAGNOSIS — Z71.3 NUTRITIONAL COUNSELING: ICD-10-CM

## 2024-02-09 DIAGNOSIS — I10 ESSENTIAL HYPERTENSION: ICD-10-CM

## 2024-02-09 DIAGNOSIS — E78.2 MIXED HYPERLIPIDEMIA: ICD-10-CM

## 2024-02-09 PROCEDURE — 97802 MEDICAL NUTRITION INDIV IN: CPT | Mod: 95 | Performed by: DIETITIAN, REGISTERED

## 2024-02-09 NOTE — LETTER
2/9/2024         RE: Valentina Olmedo  [address]  [place] [address]        Dear Colleague,    Thank you for referring your patient, Valentina Olmedo, to the [facility] [place]. Please see a copy of my visit note below.    Valentina Olmedo is a 63 year old who is being evaluated via a billable phone visit.      How would you like to obtain your AVS? MyChart  If the video visit is dropped, the invitation should be resent by: Send to e-mail at: [email or web address]  Will anyone else be joining your video visit? No          Medical Weight Loss Initial Diet Evaluation  Assessment:  This patient was referred by Dr. Mota  for MNT as treatment for Obesity which is impacting her HTN, Hyperlipidemia.       Valentina is presenting today for a new weight management nutrition consultation. Pt has had an initial appointment with Dr. Mota .    Weight loss medication:  None .     Anthropometrics:    Pt's weight is 358.3 lbs   Initial weight: 358.3 lbs  Weight change: 0  BMI: There is no height or weight on file to calculate BMI.   Ideal body weight: 59.3 kg (130 lb 11.7 oz)  Adjusted ideal body weight: 100.6 kg (221 lb 11.8 oz)    Estimated RMR ([place]-St Jeor equation):  2207 kcals x 1.2 (sedentary) = 2648 kcals (for weight maintenance)    Recommended Protein Intake: 60-80 grams of protein/day    Medical History:  Patient Active Problem List   Diagnosis     Essential hypertension     CHRIS (obstructive sleep apnea)     Seasonal and perennial allergic rhinoconjunctivitis of right eye     Closed head injury     History of thyroid cancer     Adrenal mass, left (H24)     Class 3 severe obesity due to excess calories with serious comorbidity and body mass index (BMI) of 50.0 to 59.9 in adult (H)     Nonintractable epilepsy without status epilepticus (H)     Esophageal reflux     Mixed hyperlipidemia     HLD (hyperlipidemia)     Hypothyroidism     Chronic obstructive  "pulmonary disease (H)     Schizoaffective disorder, depressive type (H)     DNR (do not resuscitate)     Migraine headache     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)     Fracture of vertebra due to osteoporosis with routine healing, subsequent encounter     Major depressive disorder, recurrent episode, moderate (H)     Venous stasis     Peripheral polyneuropathy     Pulmonary embolism with infarction (H)     Idiopathic osteoporosis     PTSD (post-traumatic stress disorder)     Long term (current) use of anticoagulants     History of melanoma     Seasonal affective disorder (H24)     Vision changes     Bilateral hand pain     Problem related to housing and economic circumstances     History of pulmonary embolism     Medial knee pain, right     Bilateral sensorineural hearing loss     Intertrigo     Dysfunction of both eustachian tubes     Hx pulmonary embolism      Diabetes: No  HbA1c:  No results found for: \"HGBA1C\"    Nutrition History:   Food allergies/intolerances/cultural or religous food customs: No     Vitamins/Mineral Supplementation: Vitamin D     Dietary Recall:  Breakfast: skipped   Lunch: sandwich (deli meat, cheese, small amount of ambrosio) or skipped   Dinner: [employer]als (variety each week)-1 time/day  Typical Snacks: crackers and cheese   Overnight eating: No  Eating out: seldom-[employer] or [facility] on occasion    Beverages: skim milk, water, Pedialyte on occasion, juice, coffee     Exercise: no set regimen-due to being in a wheelchair, a little bit of walking as tolerated 1-2 times/day down her hallway     Nutrition Diagnosis (PES statement):     Obesity related to excessive energy intake as evidence by subjective statements and BMI of 57.82 kg/m2.        Nutrition Intervention  Food and/or Nutrient Delivery   Placed emphasis on importance of developing a healthy meal routine, aiming for 3 meals a day and no snacks.  Discussed using a protein supplement as a meal replacement.    Nutrition Education "   Discussed with patient how to build a meal: the importance of including a lean/low fat protein at each meal, include a source of vegetables at a minimum of lunch and dinner and limiting carbohydrate intake to <25% per meal.  Educated on sources of lean protein, portion sizes, the amount of grams found in each source. Recommend patient to aim for 20-30g protein at each meal.  Educated on how to read a food label: keeping total fat <10g and sugar <10g per serving.  Discussed the importance of adequate hydration, with emphasis on drinking 64oz of water or zero calorie beverages per day.    Nutrition Counseling   Encouraged importance of developing routine exercise for health benefits and weight loss.    Goals established by patient:   Focus on protein first, aiming for 20-30 grams of protein/meal, 3 meals/day.   Increase water consumption, aiming for 64 oz/day.   Handouts provided:  Keys to Success     Assessment/Plan:    Pt will follow up in plans on scheduling to assess further pertaining to potential Bariatric Surgery candidacy.        Phone call duration: 18 minutes      Originating Location (pt. Location): Home      Distant Location (provider location):  Off-site      Lucía Herrera RD        Again, thank you for allowing me to participate in the care of your patient.        Sincerely,        Lucía Herrera RD

## 2024-02-09 NOTE — PROGRESS NOTES
Valentina Olmedo is a 63 year old who is being evaluated via a billable phone visit.      How would you like to obtain your AVS? MyChart  If the video visit is dropped, the invitation should be resent by: Send to e-mail at: [email or web address]  Will anyone else be joining your video visit? No          Medical Weight Loss Initial Diet Evaluation  Assessment:  This patient was referred by Dr. Mota  for MNT as treatment for Obesity which is impacting her HTN, Hyperlipidemia.       Valentina is presenting today for a new weight management nutrition consultation. Pt has had an initial appointment with Dr. Mota .    Weight loss medication:  None .     Anthropometrics:    Pt's weight is 358.3 lbs   Initial weight: 358.3 lbs  Weight change: 0  BMI: There is no height or weight on file to calculate BMI.   Ideal body weight: 59.3 kg (130 lb 11.7 oz)  Adjusted ideal body weight: 100.6 kg (221 lb 11.8 oz)    Estimated RMR ([place]-St Jeor equation):  2207 kcals x 1.2 (sedentary) = 2648 kcals (for weight maintenance)    Recommended Protein Intake: 60-80 grams of protein/day    Medical History:  Patient Active Problem List   Diagnosis    Essential hypertension    CHRIS (obstructive sleep apnea)    Seasonal and perennial allergic rhinoconjunctivitis of right eye    Closed head injury    History of thyroid cancer    Adrenal mass, left (H24)    Class 3 severe obesity due to excess calories with serious comorbidity and body mass index (BMI) of 50.0 to 59.9 in adult (H)    Nonintractable epilepsy without status epilepticus (H)    Esophageal reflux    Mixed hyperlipidemia    HLD (hyperlipidemia)    Hypothyroidism    Chronic obstructive pulmonary disease (H)    Schizoaffective disorder, depressive type (H)    DNR (do not resuscitate)    Migraine headache    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)    Fracture of vertebra due to osteoporosis with routine healing, subsequent encounter    Major depressive disorder, recurrent episode, moderate  "(H)    Venous stasis    Peripheral polyneuropathy    Pulmonary embolism with infarction (H)    Idiopathic osteoporosis    PTSD (post-traumatic stress disorder)    Long term (current) use of anticoagulants    History of melanoma    Seasonal affective disorder (H24)    Vision changes    Bilateral hand pain    Problem related to housing and economic circumstances    History of pulmonary embolism    Medial knee pain, right    Bilateral sensorineural hearing loss    Intertrigo    Dysfunction of both eustachian tubes    Hx pulmonary embolism      Diabetes: No  HbA1c:  No results found for: \"HGBA1C\"    Nutrition History:   Food allergies/intolerances/cultural or religous food customs: No     Vitamins/Mineral Supplementation: Vitamin D     Dietary Recall:  Breakfast: skipped   Lunch: sandwich (deli meat, cheese, small amount of ambrosio) or skipped   Dinner: [employer] Meals (variety each week)-1 time/day  Typical Snacks: crackers and cheese   Overnight eating: No  Eating out: seldom-[employer] or [facility] on occasion    Beverages: skim milk, water, Pedialyte on occasion, juice, coffee     Exercise: no set regimen-due to being in a wheelchair, a little bit of walking as tolerated 1-2 times/day down her hallway     Nutrition Diagnosis (PES statement):     Obesity related to excessive energy intake as evidence by subjective statements and BMI of 57.82 kg/m2.        Nutrition Intervention  Food and/or Nutrient Delivery   Placed emphasis on importance of developing a healthy meal routine, aiming for 3 meals a day and no snacks.  Discussed using a protein supplement as a meal replacement.    Nutrition Education   Discussed with patient how to build a meal: the importance of including a lean/low fat protein at each meal, include a source of vegetables at a minimum of lunch and dinner and limiting carbohydrate intake to <25% per meal.  Educated on sources of lean protein, portion sizes, the amount of grams found in each source. Recommend " patient to aim for 20-30g protein at each meal.  Educated on how to read a food label: keeping total fat <10g and sugar <10g per serving.  Discussed the importance of adequate hydration, with emphasis on drinking 64oz of water or zero calorie beverages per day.    Nutrition Counseling   Encouraged importance of developing routine exercise for health benefits and weight loss.    Goals established by patient:   Focus on protein first, aiming for 20-30 grams of protein/meal, 3 meals/day.   Increase water consumption, aiming for 64 oz/day.   Handouts provided:  Keys to Success     Assessment/Plan:    Pt will follow up in plans on scheduling to assess further pertaining to potential Bariatric Surgery candidacy.        Phone call duration: 18 minutes      Originating Location (pt. Location): Home      Distant Location (provider location):  Off-site      Lucía Herrera RD

## 2024-02-12 ENCOUNTER — MEDICAL CORRESPONDENCE (OUTPATIENT)
Dept: HEALTH INFORMATION MANAGEMENT | Facility: CLINIC | Age: 64
End: 2024-02-12
Payer: COMMERCIAL

## 2024-02-13 ENCOUNTER — HOSPITAL ENCOUNTER (OUTPATIENT)
Facility: CLINIC | Age: 64
End: 2024-02-13
Attending: INTERNAL MEDICINE | Admitting: INTERNAL MEDICINE
Payer: COMMERCIAL

## 2024-02-13 ENCOUNTER — TRANSFERRED RECORDS (OUTPATIENT)
Dept: HEALTH INFORMATION MANAGEMENT | Facility: CLINIC | Age: 64
End: 2024-02-13

## 2024-02-13 ENCOUNTER — MEDICAL CORRESPONDENCE (OUTPATIENT)
Dept: HEALTH INFORMATION MANAGEMENT | Facility: CLINIC | Age: 64
End: 2024-02-13

## 2024-02-13 DIAGNOSIS — Z53.9 DIAGNOSIS NOT YET DEFINED: Primary | ICD-10-CM

## 2024-02-13 PROCEDURE — G0179 MD RECERTIFICATION HHA PT: HCPCS | Performed by: FAMILY MEDICINE

## 2024-02-16 DIAGNOSIS — K21.00 GASTROESOPHAGEAL REFLUX DISEASE WITH ESOPHAGITIS WITHOUT HEMORRHAGE: ICD-10-CM

## 2024-02-16 RX ORDER — ESOMEPRAZOLE MAGNESIUM 40 MG/1
CAPSULE, DELAYED RELEASE ORAL
Qty: 28 CAPSULE | Refills: 2 | Status: SHIPPED | OUTPATIENT
Start: 2024-02-16

## 2024-02-19 DIAGNOSIS — G89.4 CHRONIC PAIN SYNDROME: ICD-10-CM

## 2024-02-19 RX ORDER — PREGABALIN 25 MG/1
25 CAPSULE ORAL 3 TIMES DAILY
Qty: 90 CAPSULE | Refills: 1 | Status: SHIPPED | OUTPATIENT
Start: 2024-02-19 | End: 2024-02-20

## 2024-02-19 NOTE — TELEPHONE ENCOUNTER
Received fax from pharmacy requesting refill(s) for     pregabalin (LYRICA) 25 MG capsule    Date last filled 01/26/2024    Last Appt Date:11/07/2027    Next Appt scheduled: 02/26/2024    [place]   [facility] [place] [address] [place] [address], [address],    Will route for processing    Penny Fierro MA  [facility] [place]

## 2024-02-19 NOTE — TELEPHONE ENCOUNTER
I am not sure if this is the correct dose based on fill patterns. Will route to nursing for more information.    Karen Freedman NP

## 2024-02-20 ENCOUNTER — OFFICE VISIT (OUTPATIENT)
Dept: FAMILY MEDICINE | Facility: CLINIC | Age: 64
End: 2024-02-20
Payer: COMMERCIAL

## 2024-02-20 ENCOUNTER — TELEPHONE (OUTPATIENT)
Dept: PALLIATIVE MEDICINE | Facility: OTHER | Age: 64
End: 2024-02-20

## 2024-02-20 VITALS
TEMPERATURE: 98.6 F | RESPIRATION RATE: 20 BRPM | HEIGHT: 66 IN | WEIGHT: 293 LBS | SYSTOLIC BLOOD PRESSURE: 134 MMHG | BODY MASS INDEX: 47.09 KG/M2 | OXYGEN SATURATION: 94 % | DIASTOLIC BLOOD PRESSURE: 79 MMHG | HEART RATE: 78 BPM

## 2024-02-20 DIAGNOSIS — M54.9 CHRONIC BACK PAIN, UNSPECIFIED BACK LOCATION, UNSPECIFIED BACK PAIN LATERALITY: Primary | ICD-10-CM

## 2024-02-20 DIAGNOSIS — G89.29 CHRONIC BACK PAIN, UNSPECIFIED BACK LOCATION, UNSPECIFIED BACK PAIN LATERALITY: Primary | ICD-10-CM

## 2024-02-20 DIAGNOSIS — J18.9 PNEUMONIA OF RIGHT LOWER LOBE DUE TO INFECTIOUS ORGANISM: ICD-10-CM

## 2024-02-20 DIAGNOSIS — G89.4 CHRONIC PAIN SYNDROME: ICD-10-CM

## 2024-02-20 PROCEDURE — 99213 OFFICE O/P EST LOW 20 MIN: CPT | Performed by: FAMILY MEDICINE

## 2024-02-20 RX ORDER — PREGABALIN 25 MG/1
50 CAPSULE ORAL 3 TIMES DAILY
Qty: 180 CAPSULE | Refills: 0 | Status: SHIPPED | OUTPATIENT
Start: 2024-02-20 | End: 2024-03-04

## 2024-02-20 ASSESSMENT — PATIENT HEALTH QUESTIONNAIRE - PHQ9: SUM OF ALL RESPONSES TO PHQ QUESTIONS 1-9: 16

## 2024-02-20 NOTE — TELEPHONE ENCOUNTER
Call to patient. Verified that she is taking her pregabalin 2 25 mg tablets TID.     Explained to patient that her 2/26/24 appointment with Laure will need to be re-scheduled. Patient asked if anybody could see her sooner. She has been experiencing increased pain on both sides of her mid and lower back. Thinks that it is attributed to her scoliosis. Informed patient that we would work on figuring out scheduling and get back to her.

## 2024-02-20 NOTE — PROGRESS NOTES
"  Assessment & Plan     Pneumonia of right lower lobe due to infectious organism: She still has 1-2 more days of antibiotic therapy.  Lungs are clear to auscultation.  She subjectively feels better.  Vital signs normalized.    Chronic back pain, unspecified back location, unspecified back pain laterality: Back pain has been worse recently.  We reviewed the evaluation and recommendations from her last appointments with the spine care clinic.  She underwent an injection back in the month of November that she said was actually quite helpful.  The benefit seems to be subsiding.  I have asked the patient to schedule an appointment with her back specialist.  Referral placed to help facilitate scheduling.  I suggested that she focus on this intervention before thinking of this as a pain medicine concern.  - Spine  Referral; Future    MED REC REQUIRED  Post Medication Reconciliation Status:  Patient was not discharged from an inpatient facility or TCU  Depression Screening Follow Up        2/20/2024    12:47 PM   PHQ   PHQ-9 Total Score 16   Q9: Thoughts of better off dead/self-harm past 2 weeks Nearly every day     Subjective   Valentina is a 64 year old, presenting for the following health issues:  ER F/U (ED follow-up: [facility] x 2/11/24 for cough/)      2/20/2024    12:46 PM   Additional Questions   Roomed by xl   Accompanied by spouse     HPI     Pneumonia:   - feeling better.     - she has another 1-2 days of antibiotics    Back pain:   - ongoing.  - has appointment with pain specialist later this mornth   - injection in November.         Objective    BP (!) 140/83 (BP Location: Left arm, Patient Position: Sitting, Cuff Size: Adult Large)   Pulse 78   Temp 98.6  F (37  C) (Oral)   Resp 20   Ht 1.676 m (5' 6\")   Wt (!) 162.8 kg (358 lb 12.8 oz)   LMP  (LMP Unknown)   SpO2 94%   BMI 57.91 kg/m    Body mass index is 57.91 kg/m .  Physical Exam  Nursing note reviewed. "   Constitutional:       General: She is not in acute distress.     Appearance: Normal appearance. She is not ill-appearing.   HENT:      Head: Normocephalic and atraumatic.   Eyes:      Extraocular Movements: Extraocular movements intact.      Conjunctiva/sclera: Conjunctivae normal.   Pulmonary:      Effort: Pulmonary effort is normal. No respiratory distress.      Breath sounds: Normal breath sounds. No stridor. No wheezing, rhonchi or rales.   Neurological:      Mental Status: She is alert and oriented to person, place, and time.   Psychiatric:         Attention and Perception: Attention normal.         Mood and Affect: Mood normal.         Speech: Speech normal.         Thought Content: Thought content normal.                    Signed Electronically by: Jose Maria Morin MD

## 2024-02-20 NOTE — TELEPHONE ENCOUNTER
[facility] Call Center    Phone Message    May a detailed message be left on voicemail: yes     Reason for Call: Medication Question or concern regarding medication   Prescription Clarification  Name of Medication: Pregablin  Prescribing Provider: Jose Jacob MD    [place]   [facility] [place] [address], [address]      What on the order needs clarification? Previous script was for 25 mg taking 2 capsules 3x per day.  This schript is for 1 capsule per day.  Wanting to know if this is correct?  Please call Shani back.   They do compliance packaging and the patient has 28 days at home right know.  Is the change going to be in 28 days or what is the plan of care.        Action Taken: Message routed to:  Other: [facility]    Travel Screening: Not Applicable

## 2024-02-21 ENCOUNTER — ANTICOAGULATION THERAPY VISIT (OUTPATIENT)
Dept: ANTICOAGULATION | Facility: CLINIC | Age: 64
End: 2024-02-21
Payer: COMMERCIAL

## 2024-02-21 DIAGNOSIS — Z79.01 LONG TERM (CURRENT) USE OF ANTICOAGULANTS: ICD-10-CM

## 2024-02-21 DIAGNOSIS — I26.99 PULMONARY EMBOLISM WITH INFARCTION (H): Primary | ICD-10-CM

## 2024-02-21 DIAGNOSIS — Z86.711 HISTORY OF PULMONARY EMBOLISM: ICD-10-CM

## 2024-02-21 LAB — INR (EXTERNAL): 2.7 (ref 0.9–1.1)

## 2024-02-22 NOTE — PROGRESS NOTES
Assessment:   Valentina Olmedo is a 64 y.o. female with past medical history significant for epilepsy, migraine, polyneuropathy, obstructive sleep apnea, COPD, history of PE, obesity, hyperlipidemia, hypothyroidism, hypertension, osteoporosis (on Prolia) , on warfarin, chronic kidney disease stage III, tobacco abuse, schizoaffective disorder, depression, PTSD, history of thyroid cancer, history of melanoma who presents today for follow-up regarding 2 areas of pain:  1.  Chronic neck pain with radiation into the right upper extremity with associated numbness, tingling, weakness.  Patient had a CT cervical spine at [facility] February 7, 2023 which showed moderate to severe spinal canal stenosis C4-5, C5-6, and C6-7 and moderate to severe spinal canal stenosis, right at C3-4.  There is also multilevel high-grade foraminal stenosis.  At C4-5 there is a 4 mm calcified lesion in the dorsal spinal canal which is new from 2018, possibly representing a densely mineralized meningioma.  The calcified lesion is not visualized on an MRI cervical spine.  The MRI of the cervical spine showed moderate to severe spinal canal stenosis C5-6 and C6-7 and multilevel high-grade foraminal stenosis.  Patient is following up after a C7-T1 interlaminar epidural steroid injection November 9, 2023 which provided 70% relief of pain lasting close to 3 months.  She is significant improvement in her function with improvement in sleep and range of motion.  Over the past month, same pain has returned.  -Patient saw Dr. Jacobsen July 6, 2023 regarding her cervical spine findings.  She recommended weight loss prior to consideration of elective spinal surgery with a goal BMI of less than 40.  2.  Acute on chronic low back pain and thoracic spine pain.  Pain flared up 3 to 4 weeks ago.  She believes she had a fall at home around that time.  She has tenderness to palpation in the midline in the upper thoracic region and in the thoracolumbar junction.   She does have a history of multiple compression fractures.  Will check x-rays to rule out new fracture.       Plan:     A shared decision making plan was used.  The patient's values and choices were respected.  The following represents what was discussed and decided upon by the physician assistant and the patient.      1.  DIAGNOSTIC TESTS:  -I reviewed the  CT cervical spine from [facility] from February 7, 2023.  - I reviewed the MRI cervical spine with and without contrast from [place] [facility] from July 2023.  - We discussed this patient's case at spine rounds in July 2023.  Recommendation is for a repeat CT cervical spine in 1 year to monitor the densely calcified lesion.  The order for the repeat CT is in.  - Also reviewed the patient's MRI lumbar spine from June 2023.  - I ordered x-rays of the thoracic and lumbar spine for evaluation of possible new compression fracture.    2.  PHYSICAL THERAPY: Patient has had off-and-on physical therapy for many years.  Most recent course of physical therapy outpatient was August 2022.  She also had home care PT and OT 2023.  She should continue home exercises.    3.  MEDICATIONS: No changes are made to the patient's medications.  Patient sees the pain clinic.  - Patient is currently on a prednisone burst.  - Patient can continue Tylenol as needed.  - Patient takes duloxetine 120 mg daily for mood.  - Patient cannot take NSAIDs since she is on warfarin.  - Patient takes tizanidine 4 mg 3 times daily.  - Patient reports rash with gabapentin.    4.  INTERVENTIONS: I offer the patient a repeat C7-T1 interlaminar epidural steroid injection.  Patient indicated she would like to proceed.  We will need to get medical clearance for her to hold her warfarin.  Once we receive medical clearance we will call her to schedule.    5.  REFERRALS:  - Patient has established care at the pain clinic with Laure Olivarse CNP.      6.  FOLLOW-UP: My nurse will call the patient with results of  her x-rays.  If there is a new fracture we may need MRIs to confirm acuity and then we would treat conservatively.  If there is no new fracture we we will discuss possible interventional pain management at her postinjection follow-up.  She states that she would be interested in trying an injection for her back pain.    Subjective:     Valentina Olmedo is a 64 year old female who presents today for follow-up regarding chronic neck pain.  Patient was last at our clinic for a C7-T1 interlaminar epidural steroid injection November 9 2023.  Patient reports she had 70% relief of pain for nearly 3 months.  Patient reports pain returned about a month ago.  She thinks she woke up with the pain and possibly slept wrong.  She also thinks the pain may have flared up after a fall.  She states this feels like the same pain she had before her injection.    Patient complains of bilateral neck pain.  Pain is worse on the left than the right.  She states her neck feels stiff.  Pain radiates into the right shoulder and about [facility] down the right upper arm.  Denies pain distal to the elbow.  She has numbness and tingling in both hands and both feet which is chronic.  She feels weakness in her legs.    Patient also complains of acute on chronic low back pain.  She states that her low back pain flared up 3 to 4 weeks ago after a fall at home.  She does not recall exactly what happened in the fall.  Back pain has been severe.  Pain is located in the left lower lumbar region and extends up into the left upper thoracic region.  She denies pain down the legs.  She rates her pain today as a 4 out of 10.  At its worst it is a 10 out of 10.  At its best it is a 3 out of 10.  Pain is aggravated with walking.  Pain is alleviated with sitting.    Treatment to date:  - Physical therapy off-and-on for many years.  Most recent outpatient physical therapy was August 2022.  She has had home care PT and OT 2023.    - Chiropractic treatment was helpful  in the past,  - C7-T1 interlaminar epidural steroid injection November 9, 2023 with 70% relief of pain lasting about 3 months  - No spine surgeries  - Tylenol   - Patient takes duloxetine 120 mg daily for mood  - Tizanidine 4 mg 3 times daily  -Gabapentin caused a rash  - Pregabalin  - Prednisone    Review of systems:  Positive for numbness/tingling, weakness, inability to urinate (occasional, chronic x 1.5 years), headache, pain much worse at night, trip/stumble/falls, difficulty swallowing, difficulty with hand skills.  Negative for loss of bowel/bladder control, fevers, unintentional weight loss.  Objective:   CONSTITUTIONAL:  Vital signs as above.  No acute distress.  The patient is well nourished and well groomed.  Patient is observed sitting in a wheelchair.  PSYCHIATRIC:  The patient is awake, alert, oriented to person, place and time.  The patient is answering questions appropriately with clear speech.  Normal affect.  HEENT: Normocephalic, atraumatic.  Sclera clear.    SKIN: Exposed skin is clean, dry, intact without rashes.  MUSCULOSKELETAL: Patient is observed sitting in wheelchair.  The patient has  5/5 strength for the bilateral shoulder abductors, elbow flexors/extensors, wrist extensors, finger flexors/abductors.  5/5 strength bilateral hip flexors, knee flexors/extensors, ankle Levy/plantar flexors.  Tender to palpation left cervical paraspinous muscles and bilateral upper trapezius muscles.  Tender to palpation midline upper thoracic region approximately T4 and thoracolumbar junction.  NEUROLOGICAL:   Negative Gerardo sign bilaterally.  Subjective diminished sensation fingers of both hands, feet, and knees.     RESULTS:     I reviewed theCT cervical spine from [facility] dated February 7, 2023.  This shows multilevel cervical spondylosis with moderate to severe spinal canal stenosis C4-5, C5-6, C6-7 and moderate to severe spinal canal stenosis right C3-4.  There is also moderate bilateral  foraminal stenosis bilaterally C4-5, moderate to severe bilateral foraminal stenosis C5-6, and moderate bilateral foraminal stenosis C6-7.  There is a possible densely mineralized meningioma in the dorsal spinal canal at C4-5 measuring 4 mm contributing to moderate to severe spinal canal stenosis.  This lesion is new from 2018.    I reviewed the MRI cervical spine no contrast from [place] [facility] dated July 5, 2023.  The possible densely mineralized meningioma at C4-5 is not visualized on the MRI.  There is multilevel cervical spondylosis.  At C2-3 there is moderate right foraminal stenosis.  At C3-4 there is mild spinal canal stenosis with moderate left and severe right foraminal stenosis.  At C4-5 there is moderate spinal canal stenosis with moderate right and severe left foraminal stenosis.  At C5-6 there is moderate to severe spinal canal stenosis with severe left and moderate right foraminal stenosis.  At C6-7 there is moderate spinal canal stenosis with severe left and moderate right foraminal stenosis.    I reviewed the MRI lumbar spine from [place] [facility] dated June 30, 2023.  This shows chronic compression deformities at L1, L2, and L5.  There is epidural lipomatosis and multilevel spondylosis.  At L5-S1 there is mild to moderate thecal sac stenosis, mild left and mild to moderate right foraminal stenosis.  At L4-5 there is moderate to severe thecal sac stenosis and mild bilateral foraminal stenosis.

## 2024-02-22 NOTE — TELEPHONE ENCOUNTER
Pregabalin 25 mg, two tabs TID has been sent to the pharmacy.  Please schedule patient as soon as able,patient lives in [facility])  Several providers have availability  Jasmina, Karen Sylwia, NP  Pain Nurse22 minutes ago (8:16 AM)     MM  I also have availability in [place] to help cover.     Rita Piedra DNP Taylor, Terese A; Pain Nurse15 hours ago (5:21 PM)     [place]  I have availability in [place].    SHAHRZAD Benton, SOLITARIO, FNP-C

## 2024-02-23 ENCOUNTER — MEDICAL CORRESPONDENCE (OUTPATIENT)
Dept: HEALTH INFORMATION MANAGEMENT | Facility: CLINIC | Age: 64
End: 2024-02-23
Payer: COMMERCIAL

## 2024-02-23 ENCOUNTER — OFFICE VISIT (OUTPATIENT)
Dept: PALLIATIVE MEDICINE | Facility: OTHER | Age: 64
End: 2024-02-23
Attending: NURSE PRACTITIONER
Payer: COMMERCIAL

## 2024-02-23 VITALS — OXYGEN SATURATION: 94 % | SYSTOLIC BLOOD PRESSURE: 132 MMHG | HEART RATE: 71 BPM | DIASTOLIC BLOOD PRESSURE: 71 MMHG

## 2024-02-23 DIAGNOSIS — G89.29 CHRONIC INTRACTABLE PAIN: Primary | ICD-10-CM

## 2024-02-23 DIAGNOSIS — R53.81 PHYSICAL DECONDITIONING: ICD-10-CM

## 2024-02-23 DIAGNOSIS — M54.12 CERVICAL RADICULOPATHY: ICD-10-CM

## 2024-02-23 DIAGNOSIS — E66.01 CLASS 3 SEVERE OBESITY DUE TO EXCESS CALORIES WITH SERIOUS COMORBIDITY AND BODY MASS INDEX (BMI) OF 50.0 TO 59.9 IN ADULT (H): ICD-10-CM

## 2024-02-23 DIAGNOSIS — E66.813 CLASS 3 SEVERE OBESITY DUE TO EXCESS CALORIES WITH SERIOUS COMORBIDITY AND BODY MASS INDEX (BMI) OF 50.0 TO 59.9 IN ADULT (H): ICD-10-CM

## 2024-02-23 DIAGNOSIS — S39.012A STRAIN OF LUMBAR PARASPINAL MUSCLE, INITIAL ENCOUNTER: ICD-10-CM

## 2024-02-23 DIAGNOSIS — M48.02 CERVICAL STENOSIS OF SPINAL CANAL: ICD-10-CM

## 2024-02-23 PROCEDURE — 99214 OFFICE O/P EST MOD 30 MIN: CPT | Performed by: NURSE PRACTITIONER

## 2024-02-23 PROCEDURE — G0463 HOSPITAL OUTPT CLINIC VISIT: HCPCS | Performed by: NURSE PRACTITIONER

## 2024-02-23 RX ORDER — PREDNISONE 10 MG/1
30 TABLET ORAL DAILY
Qty: 15 TABLET | Refills: 0 | Status: SHIPPED | OUTPATIENT
Start: 2024-02-23 | End: 2024-03-27

## 2024-02-23 ASSESSMENT — PAIN SCALES - GENERAL: PAINLEVEL: MODERATE PAIN (5)

## 2024-02-23 NOTE — PATIENT INSTRUCTIONS
After Visit Instructions:     Thank you for coming to [facility] for your care. It is my goal to partner with you to help you reach your optimal state of health.   Continue daily self-care, identifying contributing factors, and monitoring variations in pain level. Continue to integrate self-care into your life.      Physical therapy: YES You will be called to schedule a new patient assessment   30 minutes Video or Clinic follow-up with SHAHRZAD Luis NP-C in 2 weeks .  Ice and elevate right knee. Use ice packs on back pain.   Medication Management :   Prednisone 10 mg: take 3 tables in the morning AFTER breakfast.       SHAHRZAD Yang NP-C  [facility]  [facility] - Monday, Thursday and Friday  [facility] - Tuesday      Be sure to request ALL medication refills 5 days prior to the due date whether or not you will see your medical provider in an appointment before the due date.      Do not expect same day refills. If you do not plan ahead you may run out of medications.     Early refills are not provided.  It is your responsibility to manage your medications responsibility and keep them safely stored. Lost or destroyed medications WILL NOT be replaced    Scheduling/Clinic telephone Number for ALL locations:  [phone]    After Hours On-Call Service:  [phone]    Call with any questions about your care and for scheduling assistance.   Calls are returned Monday through Friday between 8 AM and 4:00 PM. We usually get back to you within 2 business days depending on the issue/request.    If we are prescribing your medications:  For opioid medication refills, call the clinic or send a Quincee message 7 days in advance.  Please include:  Your name and date of birth.   Name of requested medication  Name of the pharmacy.  For non-opioid medications, call your pharmacy directly to request a refill. Please allow 3-4 days to be processed.   Per [place] State  Law:  All controlled substance prescriptions must be filled within 30 days of being written.    For those controlled substances allowing refills, pickup must occur within 30 days of last fill.      We believe regular attendance is key to your success in our program!    Any time you are unable to keep your appointment we ask that you call us at least 24 hours in advance to cancel.This will allow us to offer the appointment time to another patient.   Multiple missed appointments may lead to dismissal from the clinic.

## 2024-02-23 NOTE — PROGRESS NOTES
Patient presents to the clinic today for a follow up with SHAHRZAD Wilson CNP  regarding Pain Management.           2/23/2024     3:53 PM   PEG Score   PEG Total Score 9.33            KARI Quinones [facility]      [facility]    CHIEF COMPLAINT: Chronic Pain.    INTERVAL HISTORY:  Last seen on 11/7/2023 By SHAHRZAD Anderson.       Recommendations/plan at the last visit included:  The following OTC pain medications may be helpful, use as directed: Voltaren Gel 1%, CBD products, Arnica products, Capsaicin products, Australian Dream Cream, Epson It, Arnica Products, Lidocaine Patch, Solanpas, Biofreeze, Aspercream, Tiger Balm and Jeromy Emu cream.  Apply heat or cold PRN.  Therapies:  Discussed Pain Psychology - consider in the future  Psychological treatments are also important part of pain management.  Understanding and managing the thoughts, emotions and behaviors that accompany the discomfort can help you cope more effectively with your pain and can actually reduce the intensity of your pain.  PHYSICAL THERAPY -  Encourage patient to continue working with physical therapy.  Discussed the importance of core strengthening, ROM, stretching exercises with the patient and how each of these entities is important in decreasing pain.  Explained to the patient that the purpose of physical therapy is to teach the patient a home exercise program.  These exercises need to be performed every day in order to decrease pain and prevent future occurrences of pain.      Discussed Grounding Mat - handout provided  [email or web address]/[email or web address]?[email or web address]  Discussed Frequency Specific Microcurrent - handout provided  Treatment for Neuropathic Pain.   [facility] [phone]  [facility] [phone]  [facility] [phone]  [facility] [phone]  May be able to be billed as a chiropractic service depending on your  insurance coverage.   Discussed Acupuncture.  Fax for [facility] TENs unit.  Interventions    B/l occipital nerve blocks with Dr. Zeng  [facility] appt for V[facility]TA [facility]  Follow up:   1-2 months in clinic after you are taking pregabalin 25mg 2 tabs TID.    Since last visit:   - has worsening low back that radiates around left side, occasional radiation to right thigh.   - Right knee has been hurting for 3-4 days, no injury, heard a cracking sound when she was moving from sitting to standing.     Pain Information today: Moderate Pain (5)/10. Location of pain: low back and right knee.    Annual requirements last collected:  N/A    Current Treatments:  She does not know her medication without looking them up on her phone.  Pregabalin 25mg TID per [facility]  Tegretol 200mg am and pm and 400mg q hs per Dr. Benitez for epilepsy  Meclizine 25mg 1/2 to 1 tab BID-TID dizziness - vertigo  Melatonin 3mg q hs  Oxybutynin ER - overactive bladder  Prasoin 2mg 1 tab q hs per Dr. Puente  Seroquel 25mg BID per Dr. Puente  Tizanidine 4mg TID  Chantix 1mg BID  Warfarin 5mg per anticoagulation clinic   Briviact 100mg TID - epilepsy     NOTE:  gabapentin causes rash, Lamictal causes dizziness     Previous Medication Treatments Included:  Anti-convulsants: gabapentin causes rash  Muscle relaxors:   Anti-depressants: Lamictal causes dizziness, Duloxetine 60mg 2 caps daily per Dr. Puente  Benzodiazapine's:   Acetaminophen/NSAIDs: No NSAID due to warfarin, acetaminophen is not effective  Topicals: biofreeze causes itching, burning,  Opioids: not suppose to take opioids with warfarin  Coumadin     Other Treatments Have Included:  Physical therapy: PT 3 months ago, currently in hand therapy at [facility]  Pain Psychology:   Chiropractic:   Acupuncture:   TENs Unit:   Injections:   Surgeries:   Dry Needling:   Massage:    [place] Board of Pharmacy Data Base Reviewed:    YES; No concern for abuse or misuse of controlled medications  based on this report. Reviewed [facility] February 23, 2024- no concerning fills.      PHYSICAL EXAM    Vitals:    02/23/24 1549   BP: 132/71   Pulse: 71   SpO2: 94%       Constitutional: healthy, alert, and no distress A&O.   Patient is appropriate.  Psychiatric/mental status: Alert, without lethargy or stupor. Appropriate affect.     Neurologic exam:  CN:  Cranial nerves 2-12 are grossly normal.    MUSCULOSKELETAL: Exam deferred at this appointment         DIAGNOSTIC RESULTS:    See EMR    PAIN RELEVANT CONDITIONS:   1.  Cervical stenosis of spinal canal, Cervical radiculopathy   2.  Bilateral occipital neuralgia   3.  Chronic intractable pain       DIAGNOSIS AND PLAN:     (G89.29) Chronic intractable pain  (primary encounter diagnosis)  (S39.012A) Strain of lumbar paraspinal muscle, initial encounter  (M48.02) Cervical stenosis of spinal canal  (M54.12) Cervical radiculopathy  (E66.01,  Z68.43) Class 3 severe obesity due to excess calories with serious comorbidity and body mass index (BMI) of 50.0 to 59.9 in adult (H)  (R53.81) Physical deconditioning  Comment: Seeing Valentina while her primary pain management medical provider is out of office on medical leave. She is having a severe pain flare and finding it difficult to bear weight or do much of anything independently. Will try a steroid burst 30 mg x 5 days. Referral provided to PT. Will discuss referral to weight management program at next visit.   Plan: predniSONE (DELTASONE) 10 MG tablet, Physical         Therapy  Referral    PATIENT INSTRUCTIONS:     Diagnosis reviewed, treatment option addressed, and risk/benefits discussed.  Self-care instructions given.  I am recommending a multidisciplinary treatment plan to help this patient better manage pain.    Remember to request ALL medication refills 5 BUSINESS days before you run out.     Physical therapy: YES You will be called to schedule a new patient assessment   30 minutes Video or Clinic follow-up with  SHAHRZAD Luis, NP-C in 2 weeks .  Ice and elevate right knee. Use ice packs on back pain.   Medication Management :   Prednisone 10 mg: take 3 tables in the morning AFTER breakfast.     I have reviewed the note as documented above.  This accurately captures the substance of my conversation with the patient.  A total of 27 minutes of preparation, care, and consultation were spent on this visit today.     SHAHRZAD Yang, NP-C  [facility]    (Information in italics and blue color are taken from previous pain and consulting medical providers notes and are documented as such)

## 2024-02-27 ENCOUNTER — OFFICE VISIT (OUTPATIENT)
Dept: PSYCHIATRY | Facility: CLINIC | Age: 64
End: 2024-02-27
Payer: COMMERCIAL

## 2024-02-27 VITALS
HEART RATE: 66 BPM | WEIGHT: 293 LBS | OXYGEN SATURATION: 93 % | SYSTOLIC BLOOD PRESSURE: 119 MMHG | DIASTOLIC BLOOD PRESSURE: 60 MMHG | BODY MASS INDEX: 57.78 KG/M2

## 2024-02-27 DIAGNOSIS — F25.9 SCHIZOAFFECTIVE DISORDER, UNSPECIFIED TYPE (H): Primary | ICD-10-CM

## 2024-02-27 DIAGNOSIS — E66.813 CLASS 3 SEVERE OBESITY DUE TO EXCESS CALORIES WITH SERIOUS COMORBIDITY AND BODY MASS INDEX (BMI) OF 50.0 TO 59.9 IN ADULT (H): ICD-10-CM

## 2024-02-27 DIAGNOSIS — F33.1 MAJOR DEPRESSIVE DISORDER, RECURRENT EPISODE, MODERATE (H): ICD-10-CM

## 2024-02-27 DIAGNOSIS — F43.10 PTSD (POST-TRAUMATIC STRESS DISORDER): ICD-10-CM

## 2024-02-27 DIAGNOSIS — E66.01 CLASS 3 SEVERE OBESITY DUE TO EXCESS CALORIES WITH SERIOUS COMORBIDITY AND BODY MASS INDEX (BMI) OF 50.0 TO 59.9 IN ADULT (H): ICD-10-CM

## 2024-02-27 PROCEDURE — 99215 OFFICE O/P EST HI 40 MIN: CPT | Performed by: PSYCHIATRY & NEUROLOGY

## 2024-02-27 RX ORDER — LANOLIN ALCOHOL/MO/W.PET/CERES
3 CREAM (GRAM) TOPICAL
Qty: 90 TABLET | Refills: 4 | Status: SHIPPED | OUTPATIENT
Start: 2024-02-27

## 2024-02-27 RX ORDER — QUETIAPINE FUMARATE 25 MG/1
25-50 TABLET, FILM COATED ORAL DAILY
Qty: 180 TABLET | Refills: 4 | Status: SHIPPED | OUTPATIENT
Start: 2024-02-27 | End: 2024-09-17

## 2024-02-27 ASSESSMENT — PAIN SCALES - GENERAL: PAINLEVEL: MILD PAIN (3)

## 2024-02-27 NOTE — TELEPHONE ENCOUNTER
Telephone Encounter by Promise Vanegas MD at 7/2/2021  2:52 PM     Author: Promise Vanegas MD Service: -- Author Type: Physician    Filed: 7/2/2021  2:58 PM Encounter Date: 7/2/2021 Status: Signed    : Promise Vanegas MD (Physician)       Thank you. Can you please call the patient and let her know that her thyroid is under replaced. I would like her to increase her levothyroxine to 300 mcg daily. She should also make sure she is taking this on an empty stomach in the morning. She shouldn't take it with any other pills. I would like her to return for recheck of her thyroid labs in 3 months.        Updated daughter Mame ([phone])

## 2024-02-27 NOTE — PROGRESS NOTES
"  Mental Health and Collaborative Care Psychiatry Service Rooming Note      Most pressing mental health concern at this time: \" I have been doing pretty good lately\"  Last DISCUS: 12/12/23 (0) due 6/2024   Has a home care nurse visit today  Her PCP wants her off one of the mental onur med, possible Abilify      Any new physical health conditions or diagnoses affecting you that we should be aware of: Weight gain      Side effects related to medications patient would like to discuss with the provider:  Yes - possible weight gain and brain fog      Are you taking your medications as prescribed?  yes  If not, why? NA      Do you need refills of any of the medications?  yes  If so, which ones? Melatonin      Are you taking any recreational substances? denies      Is there any chance you are pregnant? No   Do you use birth control? Hysterectomy      Provider notified  N/A        Lorraine Villegas LPN  February 27, 2024  10:26 AM      "

## 2024-02-27 NOTE — PROGRESS NOTES
Medical Decision Makin. Schizoaffective disorder, depressive type (H)  Abilify started in Dec. Pt reports mood improved since the holidays are over, requested decrease Seroquel due to wt issues.  Decrease Seroquel to 25-50mg prn  Continue:   Abilify 10mg q am.   Cymbalta 120 mg daily.  melatonin 3 mg nightly.    2. Seasonal depression   Improved now that the holidays are over and  headed to spring.        3. Borderline personality disorder  At Dec visit, we called [facility] to refer DBT therapy.  [facility] said because pt had past treatment at [facility], they needed to check on eligibility and would call her back.  Apparently that did not happen.    Plan: Deferred for now. Pt busy with appts at [facility] (PT, speech therapy, [place]).       4. High risk medication use  Risks and benefits of medication discussed (ie metabolic s/e's, TD, CV risk). No involuntary movements noted on MSE.    DISCUS score = 0 (23)  Metabolic conditions treated by PCP.   Plan: Topamax Dc'd by PCP due to brain fog.    Labs reviewed: TSH, CMP, A1c, lipids (24).    5. Class 3 severe obesity  Plan: I reviewed Wt Management Consult by Shea Mota MD, 24 w/pt and .  Unfortunately, pt can't use multiple weight loss medications due to potential risks.  10 min education/supportive therapy given.      6. Epilepsy  Managed by Vikki Benitez MD, [facility].      Next appt 2-3 mos.         History of Present Illness:   Valentina Olmedo is a 63 .o. female here for SCAD, bipolar type, PTSD, Neurocognitive disorder, seizure disorder here for f/u.  Last seen Dec.    Pt reports mood improved since the holidays are over.      Medication history  Past mood stabilizers:  -Seroquel 100 mg nightly stopped 2023 due to weight gain.   -Tegretol 400 mg 3 times daily. Dose decreased by neurology >1 year ago due to falls.  -Lamictal 200mg DC'd due to dizzyness.       In-home services:  -PCA  -Med set up q Monday by nurse.  -Food from Mom's  "meals, [facility]    Weekly INR.    SOC: Lives with  René. Cat, \"Yosi\".   [place] [facility].      ROS:  Denies SI/manic/sx.  See HPI, otherwise negative.      Current Outpatient Medications:     ARIPiprazole (ABILIFY) 10 MG tablet, Take 1 tablet (10 mg) by mouth every morning, Disp: 30 tablet, Rfl: 4    DULoxetine (CYMBALTA) 60 MG capsule, Take 2 capsules (120 mg) by mouth daily, Disp: 180 capsule, Rfl: 3    levothyroxine (SYNTHROID/LEVOTHROID) 300 MCG tablet, Take 1 tablet (300 mcg) by mouth daily, Disp: 90 tablet, Rfl: 3    melatonin 3 MG tablet, Take 1 tablet (3 mg) by mouth nightly as needed for sleep, Disp: 90 tablet, Rfl: 4    prazosin (MINIPRESS) 2 MG capsule, Take 1 capsule (2 mg) by mouth at bedtime, Disp: 90 capsule, Rfl: 1    QUEtiapine (SEROQUEL) 25 MG tablet, Take 1-2 tablets (25-50 mg) by mouth daily For agitation., Disp: 180 tablet, Rfl: 4    warfarin ANTICOAGULANT (COUMADIN) 5 MG tablet, TAKE 2 TABLETS BY MOUTH ON MON AND FRI ; TAKE 2 & 1/2 TABLETS BY MOUTH ALL OTHER DAYS AS DIRECTED BY INR CLINIC, Disp: 180 tablet, Rfl: 3    albuterol (PROAIR HFA) 108 (90 Base) MCG/ACT inhaler, Inhale 2 puffs into the lungs every 4 hours as needed for shortness of breath or wheezing, Disp: 8 g, Rfl: 4    azelastine (ASTELIN) 0.1 % nasal spray, Spray 1 spray into both nostrils 2 times daily, Disp: 30 mL, Rfl: 1    azelastine (OPTIVAR) 0.05 % ophthalmic solution, Apply 1 drop to eye 2 times daily, Disp: 10 mL, Rfl: 1    Brivaracetam (BRIVIACT) 100 MG tablet, Take 150 mg by mouth 2 times daily , Disp: , Rfl:     Calcium Citrate-Vitamin D (CALCIUM + D PO), Take 1 tablet by mouth daily , Disp: , Rfl:     carBAMazepine (TEGRETOL) 200 MG tablet, 200mg in the  at lunch and 400mg at HS, Disp: , Rfl:     Cyanocobalamin (VITAMIN B12) 1000 MCG TBCR, Take 1 tablet by mouth, Disp: , Rfl:     denosumab (PROLIA) 60 MG/ML SOSY injection, Inject 60 mg Subcutaneous once Per pt report, " Disp: , Rfl:     EPINEPHrine (ADRENACLICK JR) 0.15 MG/0.15ML injection 2-pack, Inject 0.15 mLs (0.15 mg) into the muscle as needed for anaphylaxis, Disp: 2 each, Rfl: 1    EPINEPHrine (ANY BX GENERIC EQUIV) 0.3 MG/0.3ML injection 2-pack, Inject 0.3 mLs (0.3 mg) into the muscle as needed for anaphylaxis, Disp: 2 each, Rfl: 1    esomeprazole (NEXIUM) 40 MG DR capsule, TAKE 1 CAPSULE BY MOUTH EVERY MORNING BEFORE BREAKFAST (TAKE 30-60 MINUTES BEFORE EATING), Disp: 28 capsule, Rfl: 2    fexofenadine (ALLEGRA) 180 MG tablet, TAKE 1 TABLET BY MOUTH DAILY, Disp: 90 tablet, Rfl: 3    fluticasone (FLONASE) 50 MCG/ACT nasal spray, INHALE 1 SPRAY IN EACH NOSTRIL DAILY Strength: 50 MCG/ACT, Disp: 16 g, Rfl: 11    hydrochlorothiazide (HYDRODIURIL) 25 MG tablet, 2 times daily, Disp: , Rfl:     losartan (COZAAR) 25 MG tablet, Take 0.5 tablets (12.5 mg) by mouth daily, Disp: 90 tablet, Rfl: 1    meclizine (ANTIVERT) 25 MG tablet, , Disp: , Rfl:     multivitamin with iron (CHROMAGEN) TABS half-tab, Take 1 tablet by mouth daily, Disp: , Rfl:     neomycin-polymyxin-dexAMETHasone (MAXITROL) 3.5-17825-8.1 SUSP ophthalmic susp, , Disp: , Rfl:     Nutritional Supplements (NUTRITIONAL SHAKE HIGH PROTEIN) LIQD, Take 1 each by mouth every morning Dispense Premier protein shakes. Replace breakfast with 1 shake daily., Disp: 9900 mL, Rfl: 11    nystatin (MYCOSTATIN) 920677 UNIT/GM external cream, Apply topically 2 times daily as needed (skin infection/inflammation) (Apply to the most sensitive inflamed areas of skin), Disp: 90 g, Rfl: 2    nystatin (MYCOSTATIN) 300316 UNIT/GM external powder, Apply topically 3 times daily as needed for other (fungal skin infection - apply to skin in groin), Disp: 120 g, Rfl: 1    olopatadine (PATADAY) 0.2 % ophthalmic solution, Place 1 drop into both eyes daily, Disp: 2.5 mL, Rfl: 5    omeprazole (PRILOSEC) 20 MG DR capsule, 1 capsule, Disp: , Rfl:     oxybutynin ER (DITROPAN XL) 10 MG 24 hr tablet, Take 1  tablet (10 mg) by mouth daily, Disp: 90 tablet, Rfl: 3    Polyethylene Glycol POWD, See Admin Instructions Use daily as needed, Disp: , Rfl:     predniSONE (DELTASONE) 10 MG tablet, Take 3 tablets (30 mg) by mouth daily Take in the morning after breakfast., Disp: 15 tablet, Rfl: 0    pregabalin (LYRICA) 25 MG capsule, Take 2 capsules (50 mg) by mouth 3 times daily, Disp: 180 capsule, Rfl: 0    pregabalin (LYRICA) 25 MG capsule, Take 1 tab three times daily for 2 wks then every 2 weeks add 1 tab until taking 2 tabs three times a day., Disp: 180 capsule, Rfl: 1    rosuvastatin (CRESTOR) 20 MG tablet, TAKE 1 TABLET BY MOUTH DAILY, Disp: 28 tablet, Rfl: 12    salmeterol (SEREVENT DISKUS) 50 MCG/ACT inhaler, INHALE 1 PUFF INTO THE LUNGS TWICE A DAY, Disp: 60 each, Rfl: 5    senna (SENNA-TIME) 8.6 MG tablet, TAKE 2 TABLETS (17.2MG) BY MOUTH TWICE A DAY, Disp: 120 tablet, Rfl: 11    SUMAtriptan (IMITREX) 50 MG tablet, Take 1 tablet (50 mg) by mouth at onset of headache for migraine May repeat in 2 hours. Max 4 tablets/24 hours., Disp: 9 tablet, Rfl: 1    tiZANidine (ZANAFLEX) 4 MG capsule, Take 4 mg by mouth 3 times daily Per pt report, Disp: , Rfl:     varenicline (CHANTIX) 1 MG tablet, TAKE 1 TABLET BY MOUTH TWICE A DAY, Disp: 56 tablet, Rfl: 2    Vibegron (GEMTESA) 75 MG TABS tablet, Take 75 mg by mouth daily, Disp: , Rfl:     vitamin D3 (CHOLECALCIFEROL) 50 mcg (2000 units) tablet, TAKE 2 TABLETS BY MOUTH DAILY, Disp: 180 tablet, Rfl: 3    Current Facility-Administered Medications:     denosumab (PROLIA) injection 60 mg, 60 mg, Subcutaneous, Q6 Months, Aliec Rolle MD, 60 mg at 11/14/23 1445    denosumab (PROLIA) injection 60 mg, 60 mg, Subcutaneous, Q6 Months, Alice Rolle MD       /60 (BP Location: Left arm, Patient Position: Sitting, Cuff Size: Adult Large)   Pulse 66   Wt (!) 162.4 kg (358 lb)   LMP  (LMP Unknown)   SpO2 93%   BMI 57.78 kg/m       MSE:      Alert & oriented x 3.   Appearance:  Casually dressed  Speech: Normal rate, rhythm and tone.  Gait: Not observed, in w/c. Stood during transfer.  Musculoskeletal: No abnormal movements. No fasciculations.  Mood/Affect: Full range  Thought Process: Normal rate, perseverative  Thought Content: No suicide or homicide ideation.  Associations: Intact, no delusions.  Perceptions: Denied VH/AH.  Memory: recent and remote memory intact, not formally tested  Attention span and concentration: normal, not formally tested  Language: Intact.  Fund of Knowledge: Normal.  Insight and Judgement: Fair         Total time spent on this encounter, on the date of service, including pre-visit review of separately obtained history, face-to-face interaction performing medically appropriate physical exam, patient counseling/education, interpretation of diagnostic results, care coordination and documentation was 52 minutes.        Breana Puente MD

## 2024-02-28 ENCOUNTER — TELEPHONE (OUTPATIENT)
Dept: FAMILY MEDICINE | Facility: CLINIC | Age: 64
End: 2024-02-28

## 2024-02-29 ENCOUNTER — OFFICE VISIT (OUTPATIENT)
Dept: PHYSICAL MEDICINE AND REHAB | Facility: CLINIC | Age: 64
End: 2024-02-29
Attending: FAMILY MEDICINE
Payer: COMMERCIAL

## 2024-02-29 ENCOUNTER — TELEPHONE (OUTPATIENT)
Dept: SURGERY | Facility: CLINIC | Age: 64
End: 2024-02-29

## 2024-02-29 ENCOUNTER — TELEPHONE (OUTPATIENT)
Dept: PHYSICAL MEDICINE AND REHAB | Facility: CLINIC | Age: 64
End: 2024-02-29

## 2024-02-29 ENCOUNTER — HOSPITAL ENCOUNTER (OUTPATIENT)
Dept: RADIOLOGY | Facility: HOSPITAL | Age: 64
Discharge: HOME | End: 2024-02-29
Attending: PHYSICIAN ASSISTANT
Payer: COMMERCIAL

## 2024-02-29 VITALS
HEIGHT: 66 IN | SYSTOLIC BLOOD PRESSURE: 129 MMHG | HEART RATE: 69 BPM | WEIGHT: 293 LBS | DIASTOLIC BLOOD PRESSURE: 71 MMHG | BODY MASS INDEX: 47.09 KG/M2

## 2024-02-29 DIAGNOSIS — M54.6 PAIN IN THORACIC SPINE: ICD-10-CM

## 2024-02-29 DIAGNOSIS — M48.02 CERVICAL STENOSIS OF SPINAL CANAL: ICD-10-CM

## 2024-02-29 DIAGNOSIS — M54.12 CERVICAL RADICULITIS: Primary | ICD-10-CM

## 2024-02-29 DIAGNOSIS — M54.50 LUMBAR SPINE PAIN: Primary | ICD-10-CM

## 2024-02-29 DIAGNOSIS — M54.50 LUMBAR SPINE PAIN: ICD-10-CM

## 2024-02-29 PROCEDURE — 99214 OFFICE O/P EST MOD 30 MIN: CPT | Performed by: PHYSICIAN ASSISTANT

## 2024-02-29 PROCEDURE — 72070 X-RAY EXAM THORAC SPINE 2VWS: CPT

## 2024-02-29 PROCEDURE — 72100 X-RAY EXAM L-S SPINE 2/3 VWS: CPT

## 2024-02-29 NOTE — LETTER
2/29/2024         RE: Valentina Olmedo  [address]  [place] [address]        Dear Colleague,    Thank you for referring your patient, Valentina Olmedo, to the [facility]. Please see a copy of my visit note below.    Assessment:   Valentina Olmedo is a 64 y.o. female with past medical history significant for epilepsy, migraine, polyneuropathy, obstructive sleep apnea, COPD, history of PE, obesity, hyperlipidemia, hypothyroidism, hypertension, osteoporosis (on Prolia) , on warfarin, chronic kidney disease stage III, tobacco abuse, schizoaffective disorder, depression, PTSD, history of thyroid cancer, history of melanoma who presents today for follow-up regarding 2 areas of pain:  1.  Chronic neck pain with radiation into the right upper extremity with associated numbness, tingling, weakness.  Patient had a CT cervical spine at [facility] February 7, 2023 which showed moderate to severe spinal canal stenosis C4-5, C5-6, and C6-7 and moderate to severe spinal canal stenosis, right at C3-4.  There is also multilevel high-grade foraminal stenosis.  At C4-5 there is a 4 mm calcified lesion in the dorsal spinal canal which is new from 2018, possibly representing a densely mineralized meningioma.  The calcified lesion is not visualized on an MRI cervical spine.  The MRI of the cervical spine showed moderate to severe spinal canal stenosis C5-6 and C6-7 and multilevel high-grade foraminal stenosis.  Patient is following up after a C7-T1 interlaminar epidural steroid injection November 9, 2023 which provided 70% relief of pain lasting close to 3 months.  She is significant improvement in her function with improvement in sleep and range of motion.  Over the past month, same pain has returned.  -Patient saw Dr. Jacobsen July 6, 2023 regarding her cervical spine findings.  She recommended weight loss prior to consideration of elective spinal surgery with a goal BMI of less than  40.  2.  Acute on chronic low back pain and thoracic spine pain.  Pain flared up 3 to 4 weeks ago.  She believes she had a fall at home around that time.  She has tenderness to palpation in the midline in the upper thoracic region and in the thoracolumbar junction.  She does have a history of multiple compression fractures.  Will check x-rays to rule out new fracture.       Plan:     A shared decision making plan was used.  The patient's values and choices were respected.  The following represents what was discussed and decided upon by the physician assistant and the patient.      1.  DIAGNOSTIC TESTS:  -I reviewed the  CT cervical spine from [facility] from February 7, 2023.  - I reviewed the MRI cervical spine with and without contrast from [place] [facility] from July 2023.  - We discussed this patient's case at spine rounds in July 2023.  Recommendation is for a repeat CT cervical spine in 1 year to monitor the densely calcified lesion.  The order for the repeat CT is in.  - Also reviewed the patient's MRI lumbar spine from June 2023.  - I ordered x-rays of the thoracic and lumbar spine for evaluation of possible new compression fracture.    2.  PHYSICAL THERAPY: Patient has had off-and-on physical therapy for many years.  Most recent course of physical therapy outpatient was August 2022.  She also had home care PT and OT 2023.  She should continue home exercises.    3.  MEDICATIONS: No changes are made to the patient's medications.  Patient sees the pain clinic.  - Patient is currently on a prednisone burst.  - Patient can continue Tylenol as needed.  - Patient takes duloxetine 120 mg daily for mood.  - Patient cannot take NSAIDs since she is on warfarin.  - Patient takes tizanidine 4 mg 3 times daily.  - Patient reports rash with gabapentin.    4.  INTERVENTIONS: I offer the patient a repeat C7-T1 interlaminar epidural steroid injection.  Patient indicated she would like to proceed.  We will need to get  medical clearance for her to hold her warfarin.  Once we receive medical clearance we will call her to schedule.    5.  REFERRALS:  - Patient has established care at the pain clinic with Laure Olivares CNP.      6.  FOLLOW-UP: My nurse will call the patient with results of her x-rays.  If there is a new fracture we may need MRIs to confirm acuity and then we would treat conservatively.  If there is no new fracture we we will discuss possible interventional pain management at her postinjection follow-up.  She states that she would be interested in trying an injection for her back pain.    Subjective:     Valentina Olmedo is a 64 year old female who presents today for follow-up regarding chronic neck pain.  Patient was last at our clinic for a C7-T1 interlaminar epidural steroid injection November 9 2023.  Patient reports she had 70% relief of pain for nearly 3 months.  Patient reports pain returned about a month ago.  She thinks she woke up with the pain and possibly slept wrong.  She also thinks the pain may have flared up after a fall.  She states this feels like the same pain she had before her injection.    Patient complains of bilateral neck pain.  Pain is worse on the left than the right.  She states her neck feels stiff.  Pain radiates into the right shoulder and about USP down the right upper arm.  Denies pain distal to the elbow.  She has numbness and tingling in both hands and both feet which is chronic.  She feels weakness in her legs.    Patient also complains of acute on chronic low back pain.  She states that her low back pain flared up 3 to 4 weeks ago after a fall at home.  She does not recall exactly what happened in the fall.  Back pain has been severe.  Pain is located in the left lower lumbar region and extends up into the left upper thoracic region.  She denies pain down the legs.  She rates her pain today as a 4 out of 10.  At its worst it is a 10 out of 10.  At its best it is a 3 out of 10.   Pain is aggravated with walking.  Pain is alleviated with sitting.    Treatment to date:  - Physical therapy off-and-on for many years.  Most recent outpatient physical therapy was August 2022.  She has had home care PT and OT 2023.    - Chiropractic treatment was helpful in the past,  - C7-T1 interlaminar epidural steroid injection November 9, 2023 with 70% relief of pain lasting about 3 months  - No spine surgeries  - Tylenol   - Patient takes duloxetine 120 mg daily for mood  - Tizanidine 4 mg 3 times daily  -Gabapentin caused a rash  - Pregabalin  - Prednisone    Review of systems:  Positive for numbness/tingling, weakness, inability to urinate (occasional, chronic x 1.5 years), headache, pain much worse at night, trip/stumble/falls, difficulty swallowing, difficulty with hand skills.  Negative for loss of bowel/bladder control, fevers, unintentional weight loss.  Objective:   CONSTITUTIONAL:  Vital signs as above.  No acute distress.  The patient is well nourished and well groomed.  Patient is observed sitting in a wheelchair.  PSYCHIATRIC:  The patient is awake, alert, oriented to person, place and time.  The patient is answering questions appropriately with clear speech.  Normal affect.  HEENT: Normocephalic, atraumatic.  Sclera clear.    SKIN: Exposed skin is clean, dry, intact without rashes.  MUSCULOSKELETAL: Patient is observed sitting in wheelchair.  The patient has  5/5 strength for the bilateral shoulder abductors, elbow flexors/extensors, wrist extensors, finger flexors/abductors.  5/5 strength bilateral hip flexors, knee flexors/extensors, ankle Levy/plantar flexors.  Tender to palpation left cervical paraspinous muscles and bilateral upper trapezius muscles.  Tender to palpation midline upper thoracic region approximately T4 and thoracolumbar junction.  NEUROLOGICAL:   Negative Gerardo sign bilaterally.  Subjective diminished sensation fingers of both hands, feet, and knees.     RESULTS:     I  reviewed theCT cervical spine from [facility] dated February 7, 2023.  This shows multilevel cervical spondylosis with moderate to severe spinal canal stenosis C4-5, C5-6, C6-7 and moderate to severe spinal canal stenosis right C3-4.  There is also moderate bilateral foraminal stenosis bilaterally C4-5, moderate to severe bilateral foraminal stenosis C5-6, and moderate bilateral foraminal stenosis C6-7.  There is a possible densely mineralized meningioma in the dorsal spinal canal at C4-5 measuring 4 mm contributing to moderate to severe spinal canal stenosis.  This lesion is new from 2018.    I reviewed the MRI cervical spine no contrast from [place] [facility] dated July 5, 2023.  The possible densely mineralized meningioma at C4-5 is not visualized on the MRI.  There is multilevel cervical spondylosis.  At C2-3 there is moderate right foraminal stenosis.  At C3-4 there is mild spinal canal stenosis with moderate left and severe right foraminal stenosis.  At C4-5 there is moderate spinal canal stenosis with moderate right and severe left foraminal stenosis.  At C5-6 there is moderate to severe spinal canal stenosis with severe left and moderate right foraminal stenosis.  At C6-7 there is moderate spinal canal stenosis with severe left and moderate right foraminal stenosis.    I reviewed the MRI lumbar spine from [place] [facility] dated June 30, 2023.  This shows chronic compression deformities at L1, L2, and L5.  There is epidural lipomatosis and multilevel spondylosis.  At L5-S1 there is mild to moderate thecal sac stenosis, mild left and mild to moderate right foraminal stenosis.  At L4-5 there is moderate to severe thecal sac stenosis and mild bilateral foraminal stenosis.      Again, thank you for allowing me to participate in the care of your patient.        Sincerely,        Lou Swanson PA-C

## 2024-02-29 NOTE — PATIENT INSTRUCTIONS
I have ordered x-rays of your thoracic and lumbar spine to make sure they do not have a fracture.    [facility] Scheduling    Please call [phone] to schedule your image(s) (select option#1). There are 2 different locations, see below. You can do walk-in visits for xray only images if you want.     [facility]  [address] [id number]    [place] [id number] [place] [place] [id number]     My nurse will call you with the results of the x-rays.    My nurse will also call you once we can coordinate scheduling your neck injection.  They will instruct you on when to stop taking your warfarin.

## 2024-02-29 NOTE — TELEPHONE ENCOUNTER
Pt was seen today, 2/29/24, by Lou FLORENTINO at the [facility]. It was recommended that patient proceed with a C7-T1 interlaminar epidural steroid injection for her neck pain.     Per [facility] anticoagulation guidelines, pt would need to HOLD her Coumadin for 4 days and INR to be 1.2 or less the day of injection.   Will need to get clearance from managing provider if OK for patient to hold medication. If any bridging needed, these instructions would come from manager provider as well.     Please advise if patient okay to hold medication. If clearance given we will then contact the patient to schedule. Please let us know if you have any questions. Thank you!

## 2024-02-29 NOTE — TELEPHONE ENCOUNTER
I have left a message for Valentina to call back to get on the schedule with Dr. Parikh at the request of Dr. Mota.

## 2024-03-01 ENCOUNTER — MEDICAL CORRESPONDENCE (OUTPATIENT)
Dept: HEALTH INFORMATION MANAGEMENT | Facility: CLINIC | Age: 64
End: 2024-03-01
Payer: COMMERCIAL

## 2024-03-01 ENCOUNTER — TELEPHONE (OUTPATIENT)
Dept: PHYSICAL MEDICINE AND REHAB | Facility: CLINIC | Age: 64
End: 2024-03-01
Payer: COMMERCIAL

## 2024-03-01 DIAGNOSIS — M54.6 PAIN IN THORACIC SPINE: Primary | ICD-10-CM

## 2024-03-01 NOTE — TELEPHONE ENCOUNTER
Also per [facility] Lou Swanson PA-C: Please call the patient and let her know that there are no new fractures in the lower back.     Phone call to patient to review results and provider's recommendations. Results given and explained. Discussed the new for further to have thoracic CT at this time to see if there is a fracture or not in the thoracic spine. Stated understanding. She will await schedulers call to set this up.

## 2024-03-01 NOTE — TELEPHONE ENCOUNTER
Call placed to pt. Informed her medical clearance was given. Pt was transferred to scheduling.   C7-T1 ILESI scheduled for 3/18/24 with Dr. Delgado.   Pt is aware that [facility] will be following up with her for hold plan. She stated understanding.

## 2024-03-01 NOTE — TELEPHONE ENCOUNTER
"SEPIDEH-PROCEDURAL ANTICOAGULATION  MANAGEMENT    ASSESSMENT     Warfarin interruption plan for spinal steroid injection on 03/18/2024.    Indication for Anticoagulation: PE    PE 04/2017  Per 2014 bariatric notes, vague history of blood clotting during pregnancy leading to fetal demise, with recommendation to take ASA with subsequent pregnancies   No hematology testing on file     Sepideh-Procedure Risk stratification for thromboembolism: moderate (2022 Chest guidelines)    VTE: 2022 CHEST Perioperative Management guidelines suggest against bridging for patients with Hx of VTE as sole clinical indication for warfarin except in high risk stratification patients.    RECOMMENDATION     Pre-Procedure:  Hold warfarin for 4 days, until after procedure starting 03/14/2024   No Bridge    Post-Procedure:  Resume warfarin dose if okay with provider doing procedure on night of procedure, 03/18/2024 PM: 20mg & 03/19/2024, take 20mg  Recheck INR ~ 7 days after resuming warfarin     Plan routed to referring provider for approval  ?   Paula Beasley [facility]    SUBJECTIVE/OBJECTIVE     Valentina Olmedo, a 64 year old female    Goal INR Range: 2.0-3.0     Patient bridged in past: Yes: last 01/2020 while inpatient      Wt Readings from Last 3 Encounters:   02/29/24 (!) 162.4 kg (358 lb)   02/20/24 (!) 162.8 kg (358 lb 12.8 oz)   02/06/24 (!) 162.5 kg (358 lb 4 oz)      Ideal body weight: 59.3 kg (130 lb 11.7 oz)  Adjusted ideal body weight: 100.5 kg (221 lb 10.2 oz)     Estimated body mass index is 57.78 kg/m  as calculated from the following:    Height as of an earlier encounter on 2/29/24: 1.676 m (5' 6\").    Weight as of an earlier encounter on 2/29/24: 162.4 kg (358 lb).    Lab Results   Component Value Date    INR 2.7 (A) 02/21/2024    INR 2.0 (A) 02/07/2024    INR 2.7 (A) 01/23/2024     Lab Results   Component Value Date    HGB 13.6 09/07/2023    HCT 41.6 08/18/2022     08/18/2022     Lab Results   Component Value Date    CR " 0.66 01/30/2024    CR 0.65 11/14/2023    CR 0.75 02/17/2023     Estimated Creatinine Clearance: 138.4 mL/min (based on SCr of 0.66 mg/dL).

## 2024-03-01 NOTE — TELEPHONE ENCOUNTER
----- Message from Lou Swanson PA-C sent at 3/1/2024  7:25 AM CST -----  Please call this patient and let her know that the xray thoracic spine shows no definite fracture but evaluation is limited to due soft tissues.  I recommend a CT thoracic spine for better visualization to rule out fracture.  Please let me know once you have spoken with the patient and I will enter the CT order.

## 2024-03-05 DIAGNOSIS — M54.12 CERVICAL RADICULOPATHY: Primary | ICD-10-CM

## 2024-03-05 NOTE — TELEPHONE ENCOUNTER
[facility] Call Center    Phone Message    May a detailed message be left on voicemail: yes     Reason for Call: Medication Question or concern regarding medication   Prescription Clarification  Name of Medication: Pregabalin  Prescribing Provider: Jose Jacob   Pharmacy: [facility]   What on the order needs clarification?     Shani from [facility] called.  She said that they received a discontinuation notice for this medication.  She needs confirmation if this is correct or not because the medication is set to ship tonight.  Please call Shani back to confirm.  Thanks.

## 2024-03-06 ENCOUNTER — TELEPHONE (OUTPATIENT)
Dept: FAMILY MEDICINE | Facility: CLINIC | Age: 64
End: 2024-03-06
Payer: COMMERCIAL

## 2024-03-06 ENCOUNTER — ANTICOAGULATION THERAPY VISIT (OUTPATIENT)
Dept: ANTICOAGULATION | Facility: CLINIC | Age: 64
End: 2024-03-06
Payer: COMMERCIAL

## 2024-03-06 LAB — INR (EXTERNAL): 4.2 (ref 0.9–1.1)

## 2024-03-06 NOTE — TELEPHONE ENCOUNTER
Home Health Care    Reason for call:  Orders    Orders are needed for this patient.  Orders are printed off in pcp's mailbox behind the .   They are requesting a physical signature and it to be faxed back.   Fax#[phone]     Pt Provider: [facility]     Phone Number Homecare Nurse can be reached at: [phone]

## 2024-03-06 NOTE — TELEPHONE ENCOUNTER
[facility] Call Center    Phone Message    May a detailed message be left on voicemail: yes     Reason for Call: Other: Shani from the pharmacy called to verify some medication. Patient is waiting for medication. Please review and call back [phone]     Action Taken: Message routed to:  Other: [facility]    Travel Screening: Not Applicable

## 2024-03-06 NOTE — PROGRESS NOTES
ANTICOAGULATION MANAGEMENT     Valentina Olmedo 64 year old female is on warfarin with supratherapeutic INR result. (Goal INR 2.0-3.0)    Recent labs: (last 7 days)     03/06/24  0828   INR 4.2*       ASSESSMENT     Source(s): Chart Review and Home Care/Facility Nurse     Warfarin doses taken: Warfarin taken as instructed  Diet: Decreased greens/vitamin K in diet; ongoing change, generally no greens, avoids them  Medication/supplement changes:  Completed Second round of Prednisone therapy  on 3/3/24  New illness, injury, or hospitalization: No  Signs or symptoms of bleeding or clotting: No  Previous result: Therapeutic last 2(+) visits  Additional findings: 3/18/24  Steroid injection procedure plan reviewed with RN. She will review with patient and confirm understanding.       PLAN     Recommended plan for temporary change(s) affecting INR     Dosing Instructions:  3/6/24: Hold, then continue current maintenance dose with next INR in 6 days    Summary  As of 3/6/2024      Full warfarin instructions:  3/6: Hold; 3/14: Hold; 3/15: Hold; 3/16: Hold; 3/17: Hold; 3/18: 20 mg; 3/19: 20 mg; Otherwise 7.5 mg every Wed; 10 mg all other days   Next INR check:  3/12/2024               Telephone call with [facility] home care nurse who agrees to plan and repeated back plan correctly    Orders given to  Homecare nurse/facility to recheck  Procedure plan sent via My Chart    Education provided:   Please call back if any changes to your diet, medications or how you've been taking warfarin  Goal range and lab monitoring: goal range and significance of current result    Plan made per [facility] anticoagulation protocol    Kristin Shah RN  Anticoagulation Clinic  3/6/2024    _______________________________________________________________________     Anticoagulation Episode Summary       Current INR goal:  2.0-3.0   TTR:  59.3% (1 y)   Target end date:  Indefinite   Send INR reminders to:  [facility]    Indications    History of pulmonary  embolism (Resolved) [Z86.711]  Pulmonary embolism with infarction (H) [I26.99]  Long term (current) use of anticoagulants [Z79.01]  History of pulmonary embolism [Z86.711]             Comments:               Anticoagulation Care Providers       Provider Role Specialty Phone number    Promise Vanegas MD Referring Family Medicine [phone]    Donald Rooney MD Referring Internal Medicine [phone]    Jose Maria Morin MD Referring Family Medicine [phone]

## 2024-03-07 ENCOUNTER — TRANSFERRED RECORDS (OUTPATIENT)
Dept: HEALTH INFORMATION MANAGEMENT | Facility: CLINIC | Age: 64
End: 2024-03-07
Payer: COMMERCIAL

## 2024-03-07 ENCOUNTER — MEDICAL CORRESPONDENCE (OUTPATIENT)
Dept: HEALTH INFORMATION MANAGEMENT | Facility: CLINIC | Age: 64
End: 2024-03-07
Payer: COMMERCIAL

## 2024-03-07 NOTE — TELEPHONE ENCOUNTER
Consulting with [facility].   See episode  Tosin Singh RN     POST-OP DIAGNOSIS:  Arthrofibrosis of total knee arthroplasty 07-Mar-2024 16:39:21  Jett Valverde

## 2024-03-08 ENCOUNTER — ANCILLARY PROCEDURE (OUTPATIENT)
Dept: RADIOLOGY | Facility: CLINIC | Age: 64
End: 2024-03-08
Payer: COMMERCIAL

## 2024-03-08 ENCOUNTER — TRANSFERRED RECORDS (OUTPATIENT)
Dept: HEALTH INFORMATION MANAGEMENT | Facility: CLINIC | Age: 64
End: 2024-03-08
Payer: COMMERCIAL

## 2024-03-08 NOTE — TELEPHONE ENCOUNTER
Outreach X1. Left a  requesting call back. Provided call back number.    CHRIS CauseyN, RN  Care Coordinator  [facility] [place]

## 2024-03-11 ENCOUNTER — HOSPITAL ENCOUNTER (OUTPATIENT)
Dept: RADIOLOGY | Facility: HOSPITAL | Age: 64
Discharge: HOME | End: 2024-03-11
Attending: PHYSICIAN ASSISTANT | Admitting: PHYSICIAN ASSISTANT
Payer: COMMERCIAL

## 2024-03-11 ENCOUNTER — TELEPHONE (OUTPATIENT)
Dept: PHYSICAL MEDICINE AND REHAB | Facility: CLINIC | Age: 64
End: 2024-03-11
Payer: COMMERCIAL

## 2024-03-11 DIAGNOSIS — S32.010A COMPRESSION FRACTURE OF L1 VERTEBRA, INITIAL ENCOUNTER (H): ICD-10-CM

## 2024-03-11 DIAGNOSIS — S32.010A COMPRESSION FRACTURE OF L1 VERTEBRA, INITIAL ENCOUNTER (H): Primary | ICD-10-CM

## 2024-03-11 PROCEDURE — 72100 X-RAY EXAM L-S SPINE 2/3 VWS: CPT

## 2024-03-11 RX ORDER — PREGABALIN 50 MG/1
50 CAPSULE ORAL 3 TIMES DAILY
Qty: 90 CAPSULE | Refills: 2 | Status: SHIPPED | OUTPATIENT
Start: 2024-03-11 | End: 2024-05-21

## 2024-03-11 RX ORDER — CALCITONIN SALMON 200 [IU]/.09ML
1 SPRAY, METERED NASAL DAILY
Qty: 3.7 ML | Refills: 0 | Status: SHIPPED | OUTPATIENT
Start: 2024-03-11 | End: 2024-03-21

## 2024-03-11 NOTE — TELEPHONE ENCOUNTER
This Order Has Been Discontinued    Order Status Reason By On   Discontinued Stopped by Patient (No AVS) Montserrat Jeanetteashlyn Whipple, SHAHRZAD CNP 3/4/24 2103       The patient reports she does take Lyrica. The pharmacy reports it is set up in her pill pack. Current sig is 25 MG 2 caps tid. This was just sent out to patient. Pended order for 50 MG tid. Routing to provider to review. It will be good to have this on her active med as she is still taking it.     Emerita Tabor, CHRISN, RN  Care Coordinator  [facility] [place]

## 2024-03-11 NOTE — TELEPHONE ENCOUNTER
Phone call to patient to review results and provider's recommendations. Results given and explained. Discussed the need for further imaging in form of x-rays to check on any angulation of fracture. PSP will place orders for x-rays, TLSO brace and the calcitonin spray. [facility] is preferred. Patient states she does need a new brace due to weight gain.     Restrictions reviewed with her as well.   Patient will call Dr. Rolle's office with regards to Prolia therapy.     Transferred to scheduling to make 3 week follow up appointment with PSP.

## 2024-03-11 NOTE — TELEPHONE ENCOUNTER
"Phone call to patient to inform her the orders have been placed.     Noted patient is scheduled for cervical ILESI. Per PSP Lou Swanson PA-C: theoretically the steroid could slow down the healing of the fracture, but if patient feels like she needs the injection to get relief of the neck pain then we could do it, as long as she understands the risk.\" This was reviewed with patient and she understands. She wants to have the injection at the scheduled time as she is hurting. Reviewed dates of Coumadin hold with her. Last does will be 3/13 and she will hold it 3/14-3/17 and resume after the injection completed per instructions of procedure nurses.   "

## 2024-03-11 NOTE — TELEPHONE ENCOUNTER
----- Message from Lou Swanson PA-C sent at 3/11/2024 12:42 PM CDT -----  Regarding: MRI results  Please call this patient and let her know that I reviewed her CT thoracic spine.  This does show a new compression fracture at L1.  This is the cause of her recent low back pain.  The fracture is not causing any narrowing around the nerves which is good news.  The fracture will heal, typically healing takes 3 months.  I recommend the followin.  Upright AP and lateral lumbar spine x-rays to make sure there is no angulation related to the fracture.  2.  Off-the-shelf TLSO as needed for pain relief  3.  Calcitonin nasal spray  4.  Activity restrictions: No lifting more than 5 pounds, avoid bending and twisting  5.  Follow-up with Dr. Rolle (managing Prolia) to find out if she needs a change in treatment  6.  Follow-up with me in approximately 3 weeks

## 2024-03-12 ENCOUNTER — ANTICOAGULATION THERAPY VISIT (OUTPATIENT)
Dept: ANTICOAGULATION | Facility: CLINIC | Age: 64
End: 2024-03-12
Payer: COMMERCIAL

## 2024-03-12 DIAGNOSIS — I26.99 PULMONARY EMBOLISM WITH INFARCTION (H): Primary | ICD-10-CM

## 2024-03-12 DIAGNOSIS — Z86.711 HISTORY OF PULMONARY EMBOLISM: ICD-10-CM

## 2024-03-12 DIAGNOSIS — Z79.01 LONG TERM (CURRENT) USE OF ANTICOAGULANTS: ICD-10-CM

## 2024-03-12 LAB — INR (EXTERNAL): 4.4 (ref 0.9–1.1)

## 2024-03-12 NOTE — PROGRESS NOTES
ANTICOAGULATION MANAGEMENT     Valentina Olmedo 64 year old female is on warfarin with supratherapeutic INR result. (Goal INR 2.0-3.0)    Recent labs: (last 7 days)     03/12/24  1419   INR 4.4*       ASSESSMENT     Source(s): Chart Review, Patient/Caregiver Call, and Home Care/Facility Nurse     Warfarin doses taken: Warfarin taken as instructed  Diet: No new diet changes identified  Medication/supplement changes: Amoxicillin 500mg TID starting on Thursday 3/14 for 8 days for dental   New illness, injury, or hospitalization: No  Signs or symptoms of bleeding or clotting: No  Previous result: Supratherapeutic  Additional findings:  Dental procedure on 3/19 (INR needs to be 1.0-2.0), Spinal injection on 3/18  DDS : land of [facility] , spoke with Don at        PLAN     Recommended plan for temporary change(s) and ongoing change(s) affecting INR     Dosing Instructions:  Hold today, partial hold tomorrow, then HOLD for 5 days  then decrease your warfarin dose (11.1% change) with next INR in 1 week (per home care coming out on 3/18 to recheck for dental procedure)       Summary  As of 3/12/2024      Full warfarin instructions:  3/12: Hold; 3/13: 5 mg; 3/14: Hold; 3/15: Hold; 3/16: Hold; 3/17: Hold; 3/19: 20 mg; 3/20: 20 mg; Otherwise 10 mg every Sun, Tue, Thu; 7.5 mg all other days   Next INR check:  3/18/2024               Detailed voice message left for [facility] Home Care Nurse with dosing instructions and follow up date.     Orders given to  Homecare nurse/facility to recheck    Education provided:   Contact [phone]  with any changes, questions or concerns.     Plan made with [facility] Pharmacist Paula Naranjo, RAJEEV  Anticoagulation Clinic  3/12/2024    _______________________________________________________________________     Anticoagulation Episode Summary       Current INR goal:  2.0-3.0   TTR:  59.3% (1 y)   Target end date:  Indefinite   Send INR reminders to:  [facility]     Indications    History of pulmonary embolism (Resolved) [Z86.711]  Pulmonary embolism with infarction (H) [I26.99]  Long term (current) use of anticoagulants [Z79.01]  History of pulmonary embolism [Z86.711]             Comments:               Anticoagulation Care Providers       Provider Role Specialty Phone number    Promise Vanegas MD Referring Family Medicine [phone]    Donald Rooney MD Referring Internal Medicine [phone]    Jose Maria Morin MD Referring Family Medicine [phone]

## 2024-03-13 ENCOUNTER — TELEPHONE (OUTPATIENT)
Dept: PHYSICAL MEDICINE AND REHAB | Facility: CLINIC | Age: 64
End: 2024-03-13
Payer: COMMERCIAL

## 2024-03-13 NOTE — TELEPHONE ENCOUNTER
Phone call to patient's pharmacy. Spoke with Iman. Calcitonin spray is on order for Thursday arrival.     Call to patient to inform her of this. Stated understanding.

## 2024-03-13 NOTE — TELEPHONE ENCOUNTER
Patient returned call. Results given and explained. Patient asked about brace and the pain medication. Contact information provided for [facility] [place] site. Explained Rx was sent in on 3/11/24. She states her pharmacy did not receive it. Will call and clarify.

## 2024-03-13 NOTE — TELEPHONE ENCOUNTER
----- Message from Lou Swanson PA-C sent at 3/12/2024  4:06 PM CDT -----  Please call the patient and let her know that her x-rays do not show any progressive height loss of the L1 vertebral body compared with the MRI.  No abnormal angulation related to the fracture.  This is good news.  We can review at her next visit.

## 2024-03-15 ENCOUNTER — TELEPHONE (OUTPATIENT)
Dept: PHYSICAL MEDICINE AND REHAB | Facility: CLINIC | Age: 64
End: 2024-03-15
Payer: COMMERCIAL

## 2024-03-15 ENCOUNTER — MEDICAL CORRESPONDENCE (OUTPATIENT)
Dept: HEALTH INFORMATION MANAGEMENT | Facility: CLINIC | Age: 64
End: 2024-03-15
Payer: COMMERCIAL

## 2024-03-15 ENCOUNTER — ANTICOAGULATION THERAPY VISIT (OUTPATIENT)
Dept: ANTICOAGULATION | Facility: CLINIC | Age: 64
End: 2024-03-15
Payer: COMMERCIAL

## 2024-03-15 ENCOUNTER — TELEPHONE (OUTPATIENT)
Dept: FAMILY MEDICINE | Facility: CLINIC | Age: 64
End: 2024-03-15
Payer: COMMERCIAL

## 2024-03-15 DIAGNOSIS — Z86.711 HISTORY OF PULMONARY EMBOLISM: ICD-10-CM

## 2024-03-15 DIAGNOSIS — Z79.01 LONG TERM (CURRENT) USE OF ANTICOAGULANTS: ICD-10-CM

## 2024-03-15 DIAGNOSIS — I26.99 PULMONARY EMBOLISM WITH INFARCTION (H): Primary | ICD-10-CM

## 2024-03-15 NOTE — TELEPHONE ENCOUNTER
Left VM for pt to call [facility]. Pt is scheduled for CANDY on Monday, 3-18, and was recently prescribed abx, but there is not an associated encounter to review.

## 2024-03-15 NOTE — TELEPHONE ENCOUNTER
Patient returned call. Explained it was noted with prepping for her appointment on Monday that is taking antibiotic. She does confirm this and it is for an infected tooth. Explained that we can not do the injection while she is actively being treated for an infection. She will need to complete the antibiotic and be symptom free before we can do the injection. Stated understanding. Transferred to scheduling to make appointment.      Explained as she is not having injection, she would not need to continue the Coumadin hold. Encouraged her to reach out to anticoagulation nurse to discuss this further. Stated understanding.

## 2024-03-15 NOTE — PROGRESS NOTES
ANTICOAGULATION MANAGEMENT     NO INR DRAWN TODAY      ASSESSMENT     Source(s): Chart Review and Home Care/Facility Nurse     Warfarin doses taken: Warfarin taken as instructed. Patient started holding warfarin on 3/14/24 for upcoming CANDY- this has now been canceled.   Diet: No new diet changes identified  Medication/supplement changes: Patient reports she will be starting Amoxicillin today, and will be on it for 9 days.   New illness, injury, or hospitalization: Yes: patient currently has an infection in her mouth. Patient has an appointment on 3/19/24 with her dentist, her INR will need to be between 1.0-2.0 per patient (writer has been unable to get a hold of dental office to confirm- they do not answer and do not have a voicemail). Patient states she is unsure what the procedure on 3/19/24 is.  Signs or symptoms of bleeding or clotting: No  Previous result: Supratherapeutic  Additional findings: CANDY injection on 3/19/24 was canceled for now, and will be rescheduled once antibiotics are done, and symptoms of infection have cleared.        PLAN     Recommended plan for temporary change(s) affecting INR     Dosing Instructions:  partial holds 3/15/24-3/17/24  with next INR in 3 days. May need to make adjustments to warfarin on 3/18/24 to get INR between 1.0-2.0 for 3/19/24 dental procedure.       Summary  As of 3/15/2024      Full warfarin instructions:  3/15: 5 mg; 3/16: 2.5 mg; 3/17: 5 mg; Otherwise 10 mg every Sun, Tue, Thu; 7.5 mg all other days   Next INR check:  3/18/2024               Telephone call with Katelyn home care nurse who agrees to plan and repeated back plan correctly    Orders given to  Homecare nurse/facility to recheck    Education provided:   Please call back if any changes to your diet, medications or how you've been taking warfarin  Symptom monitoring: monitoring for bleeding signs and symptoms, monitoring for clotting signs and symptoms, and monitoring for stroke signs and symptoms  Contact  [phone]  with any changes, questions or concerns.     Plan made per [facility] anticoagulation protocol    Zari Cheng RN  Anticoagulation Clinic  3/15/2024    _______________________________________________________________________     Anticoagulation Episode Summary       Current INR goal:  2.0-3.0   TTR:  59.3% (1 y)   Target end date:  Indefinite   Send INR reminders to:  [facility]    Indications    History of pulmonary embolism (Resolved) [Z86.711]  Pulmonary embolism with infarction (H) [I26.99]  Long term (current) use of anticoagulants [Z79.01]  History of pulmonary embolism [Z86.711]             Comments:               Anticoagulation Care Providers       Provider Role Specialty Phone number    Promise Vanegas MD Referring Family Medicine [phone]    Donald Rooney MD Referring Internal Medicine [phone]    Jose Maria Morin MD Referring Family Medicine [phone]

## 2024-03-15 NOTE — TELEPHONE ENCOUNTER
General Call      Reason for Call:  Patient is calling stating that she is having a upcoming procedure and her INR needs to be in the range 1-2 and as of right now her level is 4.4. Patient states normally [facility] does the INR and then calls it in. Patient procedure is on 4/03.    Vikki Guerrero   [facility]  Central     Date of last appointment with provider: Last INR was on 3/12 and is was 4.4    Could we send this information to you in [employer] or would you prefer to receive a phone call?:   Patient would prefer a phone call   Okay to leave a detailed message?: Yes at Cell number on file:    Telephone Information:   Mobile [phone]     Vikki Guerrero   [facility]  Central Scheduler

## 2024-03-18 ENCOUNTER — ANTICOAGULATION THERAPY VISIT (OUTPATIENT)
Dept: ANTICOAGULATION | Facility: CLINIC | Age: 64
End: 2024-03-18

## 2024-03-18 DIAGNOSIS — Z79.01 LONG TERM (CURRENT) USE OF ANTICOAGULANTS: ICD-10-CM

## 2024-03-18 DIAGNOSIS — Z86.711 HISTORY OF PULMONARY EMBOLISM: ICD-10-CM

## 2024-03-18 DIAGNOSIS — I26.99 PULMONARY EMBOLISM WITH INFARCTION (H): Primary | ICD-10-CM

## 2024-03-18 LAB — INR (EXTERNAL): 1.1 (ref 0.9–1.1)

## 2024-03-18 NOTE — PROGRESS NOTES
ANTICOAGULATION MANAGEMENT     Valentina Olmedo 64 year old female is on warfarin with subtherapeutic INR result. (Goal INR 2.0-3.0)    Recent labs: (last 7 days)     03/18/24  1215   INR 1.1       ASSESSMENT     Source(s): Chart Review and Home Care/Facility Nurse     Warfarin doses taken: Held for Cancelled procedure  recently which may be affecting INR. Patient confirms with Home care nurse that she resumed coumadin dosing 3/15 as planned after procedure cancelled. Procedure rescheduled due to current gum infection.  Diet: No new diet changes identified  Medication/supplement changes: None noted  New illness, injury, or hospitalization: No  Signs or symptoms of bleeding or clotting: No  Previous result: Supratherapeutic  Additional findings:  Dental procedure tomorrow to remove/help gum infection. INR needed to be 1.1-2.0       PLAN     Recommended plan for temporary change(s) affecting INR     Dosing Instructions: Continue your current warfarin dose with next INR in 1 week       Summary  As of 3/18/2024      Full warfarin instructions:  10 mg every Sun, Tue, Thu; 7.5 mg all other days   Next INR check:  3/25/2024               Telephone call with Katelyn home care nurse who agrees to plan and repeated back plan correctly    Orders given to  Homecare nurse/facility to recheck    Education provided:   Please call back if any changes to your diet, medications or how you've been taking warfarin    Plan made per [facility] anticoagulation protocol    Melissa Montenegro, RN  Anticoagulation Clinic  3/18/2024    _______________________________________________________________________     Anticoagulation Episode Summary       Current INR goal:  2.0-3.0   TTR:  59.3% (1 y)   Target end date:  Indefinite   Send INR reminders to:  [facility]    Indications    History of pulmonary embolism (Resolved) [Z86.711]  Pulmonary embolism with infarction (H) [I26.99]  Long term (current) use of anticoagulants [Z79.01]  History of pulmonary  embolism [Z86.711]             Comments:               Anticoagulation Care Providers       Provider Role Specialty Phone number    Promise Vanegas MD Referring Family Medicine [phone]    Donald Rooney MD Referring Internal Medicine [phone]    Jose Maria Morin MD Referring Family Medicine [phone]

## 2024-03-19 ENCOUNTER — TELEPHONE (OUTPATIENT)
Dept: PSYCHOLOGY | Facility: CLINIC | Age: 64
End: 2024-03-19
Payer: COMMERCIAL

## 2024-03-19 ENCOUNTER — MEDICAL CORRESPONDENCE (OUTPATIENT)
Dept: HEALTH INFORMATION MANAGEMENT | Facility: CLINIC | Age: 64
End: 2024-03-19
Payer: COMMERCIAL

## 2024-03-19 NOTE — TELEPHONE ENCOUNTER
No Show for NP appointment.  Client texted a link to client to join appointment and called and left a voice message when she did not join.

## 2024-03-20 DIAGNOSIS — S32.010A COMPRESSION FRACTURE OF L1 VERTEBRA, INITIAL ENCOUNTER (H): ICD-10-CM

## 2024-03-21 ENCOUNTER — TELEPHONE (OUTPATIENT)
Dept: FAMILY MEDICINE | Facility: CLINIC | Age: 64
End: 2024-03-21
Payer: COMMERCIAL

## 2024-03-21 ENCOUNTER — MYC REFILL (OUTPATIENT)
Dept: PSYCHIATRY | Facility: CLINIC | Age: 64
End: 2024-03-21
Payer: COMMERCIAL

## 2024-03-21 RX ORDER — CALCITONIN SALMON 200 [IU]/.09ML
SPRAY, METERED NASAL
Qty: 3.7 ML | Refills: 0 | Status: SHIPPED | OUTPATIENT
Start: 2024-03-21 | End: 2024-05-14

## 2024-03-21 NOTE — TELEPHONE ENCOUNTER
[facility] received faxed refill request for DULoxetine (CYMBALTA) 60 MG capsule ,  prazosin (MINIPRESS) 2 MG capsule     Promise Ramos CMA March 21, 2024

## 2024-03-21 NOTE — TELEPHONE ENCOUNTER
FYI - Status Update    Who is Calling: patient    Update: Patient is calling in regards to her appointment tomorrow 3/22. Spouse is unable to drive her to appointment due to weather and she is requesting to use medical transportation to make this appointment. She states [employer] is not affordable and the insurance company instructed her to contact the provider for a transportation form. Number not available for insurance. She would also like orders placed for a protein shake. The protein shake is only available through [employer] Fax: [phone] or [phone].    Does caller want a call/response back: Yes     Could we send this information to you in [place] or would you prefer to receive a phone call?:   Patient would prefer a phone call   Okay to leave a detailed message?: Yes at Cell number on file:    Telephone Information:   Mobile [phone]

## 2024-03-21 NOTE — TELEPHONE ENCOUNTER
Spoke with Valentina she will try to find ride to clinic tomorrow.  Seems the bus at her facility charges money [employer] forms printed and placed at work station

## 2024-03-22 ENCOUNTER — OFFICE VISIT (OUTPATIENT)
Dept: FAMILY MEDICINE | Facility: CLINIC | Age: 64
End: 2024-03-22
Payer: COMMERCIAL

## 2024-03-22 VITALS
DIASTOLIC BLOOD PRESSURE: 85 MMHG | SYSTOLIC BLOOD PRESSURE: 132 MMHG | WEIGHT: 293 LBS | OXYGEN SATURATION: 91 % | HEART RATE: 84 BPM | TEMPERATURE: 98.3 F | BODY MASS INDEX: 58.41 KG/M2

## 2024-03-22 DIAGNOSIS — G40.909 NONINTRACTABLE EPILEPSY WITHOUT STATUS EPILEPTICUS, UNSPECIFIED EPILEPSY TYPE (H): ICD-10-CM

## 2024-03-22 DIAGNOSIS — J44.9 CHRONIC OBSTRUCTIVE PULMONARY DISEASE, UNSPECIFIED COPD TYPE (H): Primary | ICD-10-CM

## 2024-03-22 DIAGNOSIS — Z23 NEED FOR SHINGLES VACCINE: ICD-10-CM

## 2024-03-22 DIAGNOSIS — E66.01 CLASS 3 SEVERE OBESITY DUE TO EXCESS CALORIES WITH SERIOUS COMORBIDITY AND BODY MASS INDEX (BMI) OF 50.0 TO 59.9 IN ADULT (H): ICD-10-CM

## 2024-03-22 DIAGNOSIS — E66.813 CLASS 3 SEVERE OBESITY DUE TO EXCESS CALORIES WITH SERIOUS COMORBIDITY AND BODY MASS INDEX (BMI) OF 50.0 TO 59.9 IN ADULT (H): ICD-10-CM

## 2024-03-22 PROCEDURE — 90750 HZV VACC RECOMBINANT IM: CPT | Performed by: FAMILY MEDICINE

## 2024-03-22 PROCEDURE — 90471 IMMUNIZATION ADMIN: CPT | Performed by: FAMILY MEDICINE

## 2024-03-22 PROCEDURE — 99215 OFFICE O/P EST HI 40 MIN: CPT | Mod: 25 | Performed by: FAMILY MEDICINE

## 2024-03-22 RX ORDER — UMECLIDINIUM 62.5 UG/1
1 AEROSOL, POWDER ORAL DAILY
Qty: 30 EACH | Refills: 2 | Status: SHIPPED | OUTPATIENT
Start: 2024-03-22 | End: 2024-06-07

## 2024-03-22 NOTE — ASSESSMENT & PLAN NOTE
The patient and I have previously discussed need to improve overall protein content.  She is limited on financial resources.  She and I wonder if she might get insurance support for protein supplementation.

## 2024-03-22 NOTE — ASSESSMENT & PLAN NOTE
Paperwork was completed today as part of today's visit for [facility] mobility.  Permanent limitation.

## 2024-03-22 NOTE — ASSESSMENT & PLAN NOTE
She describes some shortness of breath when stringing together long sentences.  She wonders if this might be related to COPD.  This also might be due to fat mass.  She is on therapy for COPD.  We will add a muscarinic agent.  This was prescribed.  Follow-up recommended 1 month.

## 2024-03-22 NOTE — PROGRESS NOTES
Assessment & Plan   Problem List Items Addressed This Visit       Chronic obstructive pulmonary disease (H) - Primary     She describes some shortness of breath when stringing together long sentences.  She wonders if this might be related to COPD.  This also might be due to fat mass.  She is on therapy for COPD.  We will add a muscarinic agent.  This was prescribed.  Follow-up recommended 1 month.         Relevant Medications    umeclidinium (INCRUSE ELLIPTA) 62.5 MCG/ACT inhaler    Class 3 severe obesity due to excess calories with serious comorbidity and body mass index (BMI) of 50.0 to 59.9 in adult (H)     The patient and I have previously discussed need to improve overall protein content.  She is limited on financial resources.  She and I wonder if she might get insurance support for protein supplementation.           Nonintractable epilepsy without status epilepticus (H)     Paperwork was completed today as part of today's visit for [employer] [facility].  Permanent limitation.          Other Visit Diagnoses       Need for shingles vaccine               42 minutes spent by me on the date of the encounter doing chart review, history and exam, documentation and further activities per the note         Magda Montgomery is a 64 year old, presenting for the following health issues:  Forms and Orders (Need Orders for  protein shake to [facility] Fax: [phone] or [phone].)        3/22/2024     4:10 PM   Additional Questions   Roomed by xl   Accompanied by spouse   Failed to redirect to the Timeline version of the [employer] Smart[employer].  Patient presents with a number of concerns today:  - [employer] [facility]: Request paper completion  -Skin: She was recently treated for a skin cancer.  This was removed en bloc from her face by dermatology with subsequent sutures.  She is experienced a small fullness in the skin that she wonders about.  She is concerned they may need additional evaluation.  -Breathing: She has noticed  recently that as she talks she will at times get short of breath.  She has to take a deep breath before proceeding.  She wonders if this may be related to COPD.  She does take a therapy for COPD on a daily basis.  She is unsure if it has been helpful.  - Nutrition: She has had a couple of providers recommend increasing protein consumption in the interest of improved satiety and preservation of lean mass.  Financially, this is a burden.  We brainstormed ideas and how she might accomplish increased protein.  1 suggestion is to add a protein shake.  She has been told that some of the protein products might cause her to have variability of warfarin.  For this reason she wants a straightforward protein shake (not a meal replacement product such as boost).  She has had the Premier brand in the past and wonders if this can be prescribed.    History of Present Illness       Reason for visit:  Talk to him    She eats 2-3 servings of fruits and vegetables daily.She consumes 1 sweetened beverage(s) daily.She exercises with enough effort to increase her heart rate 20 to 29 minutes per day.  She exercises with enough effort to increase her heart rate 4 days per week.   She is taking medications regularly.        Objective    /85 (BP Location: Right arm, Patient Position: Sitting, Cuff Size: Adult Large)   Pulse 84   Temp 98.3  F (36.8  C) (Oral)   Wt (!) 164.2 kg (361 lb 14.4 oz)   LMP  (LMP Unknown)   SpO2 91%   BMI 58.41 kg/m    Body mass index is 58.41 kg/m .  Physical Exam  Nursing note reviewed.   Constitutional:       General: She is not in acute distress.     Appearance: Normal appearance. She is not ill-appearing.   HENT:      Head: Normocephalic and atraumatic.      Comments: Left nasolabial fold shows a difficult to discern surgical scar with a small amount of erythema in the superior aspect.  No specific mass or abnormality noted with direct visualization.    Patient shows that she has stitches across the  mucosa above her 2 front incisors.  Eyes:      Extraocular Movements: Extraocular movements intact.      Conjunctiva/sclera: Conjunctivae normal.   Pulmonary:      Effort: Pulmonary effort is normal.   Neurological:      Mental Status: She is alert and oriented to person, place, and time.   Psychiatric:         Attention and Perception: Attention normal.         Mood and Affect: Mood normal.         Speech: Speech normal.         Thought Content: Thought content normal.                    Signed Electronically by: Jose Maria Morin MD

## 2024-03-25 ENCOUNTER — TELEPHONE (OUTPATIENT)
Dept: ANTICOAGULATION | Facility: CLINIC | Age: 64
End: 2024-03-25

## 2024-03-25 ENCOUNTER — ANTICOAGULATION THERAPY VISIT (OUTPATIENT)
Dept: ANTICOAGULATION | Facility: CLINIC | Age: 64
End: 2024-03-25

## 2024-03-25 DIAGNOSIS — Z79.01 LONG TERM (CURRENT) USE OF ANTICOAGULANTS: ICD-10-CM

## 2024-03-25 DIAGNOSIS — I26.99 PULMONARY EMBOLISM WITH INFARCTION (H): Primary | ICD-10-CM

## 2024-03-25 DIAGNOSIS — Z86.711 HISTORY OF PULMONARY EMBOLISM: ICD-10-CM

## 2024-03-25 LAB — INR (EXTERNAL): 2.5 (ref 0.9–1.1)

## 2024-03-25 NOTE — PROGRESS NOTES
ANTICOAGULATION MANAGEMENT     Valentina Olmedo 64 year old female is on warfarin with therapeutic INR result. (Goal INR 2.0-3.0)    Recent labs: (last 7 days)     03/25/24  1107   INR 2.5*       ASSESSMENT     Source(s): Chart Review, Patient/Caregiver Call, and Home Care/Facility Nurse     Warfarin doses taken: Warfarin taken as instructed  Diet: No new diet changes identified  Medication/supplement changes: None noted, new inhaler  New illness, injury, or hospitalization: No  Signs or symptoms of bleeding or clotting: No  Previous result: Subtherapeutic  Additional findings: Esophagogastroduodenscopy - 5/2/24; spinal injection through a clinic in [place]. Was instructed to hold for 5 days. [facility].       PLAN     Recommended plan for no diet, medication or health factor changes affecting INR     Dosing Instructions: Continue your current warfarin dose with next INR in 2 weeks       Summary  As of 3/25/2024      Full warfarin instructions:  10 mg every Sun, Tue, Thu; 7.5 mg all other days   Next INR check:  4/2/2024               Telephone call with [facility] home care nurse who verbalizes understanding and agrees to plan    Roselyn wants a call with the procedure plan. Sent to [facility] for review.    Orders given to  Homecare nurse/facility to recheck    Education provided:   None required    Plan made per [facility] anticoagulation protocol    Kindra Garay, RN  Anticoagulation Clinic  3/25/2024    _______________________________________________________________________     Anticoagulation Episode Summary       Current INR goal:  2.0-3.0   TTR:  60.0% (1 y)   Target end date:  Indefinite   Send INR reminders to:  [facility]    Indications    History of pulmonary embolism (Resolved) [Z86.711]  Pulmonary embolism with infarction (H) [I26.99]  Long term (current) use of anticoagulants [Z79.01]  History of pulmonary embolism [Z86.711]             Comments:               Anticoagulation Care  Providers       Provider Role Specialty Phone number    Promise Vanegas MD Referring Family Medicine [phone]    Donald Rooney MD Referring Internal Medicine [phone]    Jose Maria Morin MD Referring Family Medicine [phone]

## 2024-03-25 NOTE — TELEPHONE ENCOUNTER
[facility]-PROCEDURAL ANTICOAGULATION  MANAGEMENT    Valentina requesting pre-procedure hold orders for warfarin and review for bridging    [facility] home care needs call with instructions at [phone]      Procedure date: 04/03/2024       Procedure:  repeat C7-T1 ILESI      Procedure location and phone number (if external): [facility]     Number of warfarin hold days requested and/or target INR: 4 days    Pre-op date:  none noted in system      Routing to Anticoagulation Pharmacist for review.      Kindra Garay RN

## 2024-03-26 NOTE — TELEPHONE ENCOUNTER
Called patient and home care nurse, Roselyn, and advised them of plan below. They both verbalized understanding and agree to plan. Updated calendar. Patient will call back with further concerns.

## 2024-03-26 NOTE — TELEPHONE ENCOUNTER
"[facility]-PROCEDURAL ANTICOAGULATION  MANAGEMENT    ASSESSMENT     Warfarin interruption plan for CANDY on 2024.    Indication for Anticoagulation: PE      PE 2017  Per 2014 bariatric notes, vague history of blood clotting during pregnancy leading to fetal demise, with recommendation to take ASA with subsequent pregnancies   No hematology testing on file     [facility]-Procedure Risk stratification for thromboembolism: moderate ( Chest guidelines)    VTE:  CHEST Perioperative Management guidelines suggest against bridging for patients with Hx of VTE as sole clinical indication for warfarin except in high risk stratification patients.    RECOMMENDATION     Pre-Procedure:  Hold warfarin for 5 days, until after procedure startin2024   No Bridge    Post-Procedure:  Resume warfarin dose if okay with provider doing procedure on night of procedure, 2024 PM: 20mg & 2024 20mg  Recheck INR ~ 7 days after resuming warfarin     Plan routed to referring provider for approval  ?   Paula Beasley [facility]    SUBJECTIVE/OBJECTIVE     Valentina Olmedo, a 64 year old female    Goal INR Range: 2.0-3.0     Patient bridged in past: Yes: last 2020 while inpatient    Pertinent History: tolerated hold & no bridge for 2024 spinal injection    Wt Readings from Last 3 Encounters:   24 (!) 164.2 kg (361 lb 14.4 oz)   24 (!) 162.4 kg (358 lb)   24 (!) 162.8 kg (358 lb 12.8 oz)      Ideal body weight: 59.3 kg (130 lb 11.7 oz)  Adjusted ideal body weight: 101.2 kg (223 lb 3.2 oz)     Estimated body mass index is 58.41 kg/m  as calculated from the following:    Height as of 24: 1.676 m (5' 6\").    Weight as of 3/22/24: 164.2 kg (361 lb 14.4 oz).    Lab Results   Component Value Date    INR 2.5 (A) 2024    INR 1.1 2024    INR 4.4 (A) 2024     Lab Results   Component Value Date    HGB 13.6 2023    HCT 41.6 2022     2022     Lab Results   Component Value " Date    CR 0.66 01/30/2024    CR 0.65 11/14/2023    CR 0.75 02/17/2023     Estimated Creatinine Clearance: 139.5 mL/min (based on SCr of 0.66 mg/dL).

## 2024-03-27 ENCOUNTER — OFFICE VISIT (OUTPATIENT)
Dept: FAMILY MEDICINE | Facility: CLINIC | Age: 64
End: 2024-03-27
Payer: COMMERCIAL

## 2024-03-27 ENCOUNTER — MEDICAL CORRESPONDENCE (OUTPATIENT)
Dept: HEALTH INFORMATION MANAGEMENT | Facility: CLINIC | Age: 64
End: 2024-03-27

## 2024-03-27 VITALS
OXYGEN SATURATION: 94 % | TEMPERATURE: 98.3 F | SYSTOLIC BLOOD PRESSURE: 135 MMHG | BODY MASS INDEX: 47.09 KG/M2 | RESPIRATION RATE: 18 BRPM | DIASTOLIC BLOOD PRESSURE: 83 MMHG | HEIGHT: 66 IN | WEIGHT: 293 LBS | HEART RATE: 83 BPM

## 2024-03-27 DIAGNOSIS — H25.9 AGE-RELATED CATARACT OF BOTH EYES, UNSPECIFIED AGE-RELATED CATARACT TYPE: ICD-10-CM

## 2024-03-27 DIAGNOSIS — Z01.818 PREOP GENERAL PHYSICAL EXAM: Primary | ICD-10-CM

## 2024-03-27 DIAGNOSIS — H10.31 ACUTE CONJUNCTIVITIS OF RIGHT EYE, UNSPECIFIED ACUTE CONJUNCTIVITIS TYPE: ICD-10-CM

## 2024-03-27 PROCEDURE — 99214 OFFICE O/P EST MOD 30 MIN: CPT | Performed by: FAMILY MEDICINE

## 2024-03-27 RX ORDER — POLYMYXIN B SULFATE AND TRIMETHOPRIM 1; 10000 MG/ML; [USP'U]/ML
1-2 SOLUTION OPHTHALMIC EVERY 4 HOURS
Qty: 10 ML | Refills: 0 | Status: SHIPPED | OUTPATIENT
Start: 2024-03-27

## 2024-03-27 ASSESSMENT — ENCOUNTER SYMPTOMS
SHORTNESS OF BREATH: 0
BRUISES/BLEEDS EASILY: 0
WEAKNESS: 0
EYE PAIN: 0
DIARRHEA: 0
HEADACHES: 0
CONSTIPATION: 0
DIZZINESS: 0
PARESTHESIAS: 0
ABDOMINAL PAIN: 0
CHILLS: 0
DYSURIA: 0
COUGH: 0
ARTHRALGIAS: 0
MYALGIAS: 0
FREQUENCY: 0
SORE THROAT: 0
PSYCHIATRIC NEGATIVE: 1
FEVER: 0
PALPITATIONS: 0

## 2024-03-27 NOTE — PROGRESS NOTES
Preoperative Evaluation  [place] [id number]  [facility] [address]  Phone: [phone]  Fax: [phone]  Primary Provider: Jose Maria Camp  Pre-op Performing Provider: JOSE MARIA CAMP  Mar 27, 2024     Valentina is a 64 year old, presenting for the following:  Pre-Op Exam        3/27/2024    10:28 AM   Additional Questions   Roomed by Kumar Chahal MA   Accompanied by Self         3/27/2024    10:28 AM   Patient Reported Additional Medications   Patient reports taking the following new medications None     Surgical Information  Surgery/Procedure: Cataract surgery   Surgery Location: [facility]  Surgeon: Dr. Orozco  Surgery Date: 4/4/24 left eye 4/18/24 right eye  Time of Surgery: Unknown  Where patient plans to recover: At home with family  Fax number for surgical facility: [facility] Ph: [phone] Fax: [phone].    Subjective     HPI related to upcoming procedure: Decline in vision.  Planned staged cataract surgery.  Patient notes that for the past hour she has had a sense of fullness around the right eye with some blurriness of vision.  This was not present earlier today.  She otherwise feels well.        3/27/2024    10:29 AM   Preop Questions   1. Have you ever had a heart attack or stroke? YES - Stroke as a baby   2. Have you ever had surgery on your heart or blood vessels, such as a stent placement, a coronary artery bypass, or surgery on an artery in your head, neck, heart, or legs? No   3. Do you have chest pain with activity? NO - She has pain that is relieved with acid reducing medications.    4. Do you have a history of  heart failure? No   5. Do you currently have a cold, bronchitis or symptoms of other infection? No   6. Do you have a cough, shortness of breath, or wheezing? YES -she has had some intermittent shortness of breath thought to be related to COPD.  Today, she is at baseline.  She has not yet started a new COPD  medication that was prescribed last week.    7. Do you or anyone in your family have previous history of blood clots? YES - Patient hx of blood clots   8. Do you or does anyone in your family have a serious bleeding problem such as prolonged bleeding following surgeries or cuts? No   9. Have you ever had problems with anemia or been told to take iron pills? No   10. Have you had any abnormal blood loss such as black, tarry or bloody stools, or abnormal vaginal bleeding? No   11. Have you ever had a blood transfusion? No   12. Are you willing to have a blood transfusion if it is medically needed before, during, or after your surgery? Yes   13. Have you or any of your relatives ever had problems with anesthesia? UNKNOWN - Pt think it's maternal grandma   14. Do you have sleep apnea, excessive snoring or daytime drowsiness? YES - Sleep Pad   14a. Do you have a CPAP machine? Yes   15. Do you have any artifical heart valves or other implanted medical devices like a pacemaker, defibrillator, or continuous glucose monitor? No   16. Do you have artificial joints? No   17. Are you allergic to latex? YES: Latex; itchiness      Patient Active Problem List    Diagnosis Date Noted    Age-related cataract of both eyes, unspecified age-related cataract type 03/27/2024     Priority: Medium    Intertrigo 01/03/2024     Priority: Medium    Dysfunction of both eustachian tubes 01/03/2024     Priority: Medium    Medial knee pain, right 11/17/2023     Priority: Medium    Bilateral sensorineural hearing loss 11/17/2023     Priority: Medium    History of pulmonary embolism 07/28/2023     Priority: Medium    Problem related to housing and economic circumstances 07/25/2023     Priority: Medium    Bilateral hand pain 05/26/2023     Priority: Medium    Vision changes 03/09/2023     Priority: Medium    Seasonal affective disorder (H24) 02/19/2023     Priority: Medium    Hx pulmonary embolism 02/07/2023     Priority: Medium    History of  melanoma 10/28/2022     Priority: Medium    Long term (current) use of anticoagulants 08/22/2022     Priority: Medium    PTSD (post-traumatic stress disorder) 08/12/2022     Priority: Medium    Idiopathic osteoporosis 10/12/2021     Priority: Medium    Pulmonary embolism with infarction (H) 09/24/2021     Priority: Medium    Peripheral polyneuropathy 08/20/2021     Priority: Medium    Venous stasis 08/08/2021     Priority: Medium    Class 3 severe obesity due to excess calories with serious comorbidity and body mass index (BMI) of 50.0 to 59.9 in adult (H) 08/06/2021     Priority: Medium    Mixed hyperlipidemia 08/06/2021     Priority: Medium    Seasonal and perennial allergic rhinoconjunctivitis of right eye 07/29/2021     Priority: Medium    Essential hypertension 12/22/2020     Priority: Medium    Fracture of vertebra due to osteoporosis with routine healing, subsequent encounter 09/30/2020     Priority: Medium    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 According to the patient, the tumor was benign. She is not sure whether or not the tumor belonged to the thyroid gland. The surgery was done here at the [place]. In January 2011, she was diagnosed with kidney stones. She was found to have an elevated calcium level of 11.7, with a PTH level of 368. Stone analysis showed calcium oxalate, calcium phosphate and carbonate form of calcium phosphate hydroxyl. She was also found to have vitamin D deficiency with vitamin D levels between 20 and 25 ng per mL and was given 50,000 units ergocalciferol on a weekly basis. She's been taking the high dose vitamin D for over a year now. Most recent labs from October only checked vitamin D3 level which was mildly low at 23.    Thyroid sestamibi scan and neck ultrasound showed enlarged  parathyroid adenoma measuring 2.5 cm on the right side. In addition, on the left side, there was a description of a hypoechoic ovoid focus superior and lateral to the left lobe of the thyroid, measuring 1.7 cm, which was considered to represent either a lymph node or another parathyroid adenoma. She underwent completion total thyroidectomy and right superior parathyroidectomy at [facility] with Dr. Keily Gregory on 11/19/11. 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 She is currently on 237  g levothyroxine daily. Last time the dose of levothyroxine was changed was in March 2012. Last TSH from 6/13/12 was 0.42.      Nonintractable epilepsy without status epilepticus (H) 01/07/2020     Priority: Medium    Esophageal reflux 01/07/2020     Priority: Medium    Chronic obstructive pulmonary disease (H) 01/07/2020     Priority: Medium    Schizoaffective disorder, depressive type (H) 02/06/2019     Priority: Medium    DNR (do not resuscitate) 09/13/2018     Priority: Medium    CHRIS (obstructive sleep apnea) 06/12/2018     Priority: Medium    Major depressive disorder, recurrent episode, moderate (H) 09/29/2015     Priority: Medium    Closed head injury 03/04/2015     Priority: Medium     Created by [facility] Annotation: Apr 2 2012  9:53AM - Mariah Roy: Fell at work.      HLD (hyperlipidemia) 11/12/2013     Priority: Medium    Hypothyroidism 11/12/2013     Priority: Medium    Migraine headache 11/12/2013     Priority: Medium    Adrenal mass, left (H24) 09/17/2013     Priority: Medium      Past Medical History:   Diagnosis Date    Adrenal mass (H24) 10/12/2015    Chen Null MD  They were incidentally discovered on the CAT scan of the abdomen from December 2011 which was done for evaluation of kidney stones. F/up CT of the abdomen done on 6/26/12 showed a stable 5 mm nodular opacity in the  right lower lung lobe, adjacent to the diagram. Bilateral adrenal masses average density measured less than 0 Hounsfield units, consistent with benign etio    Anxiety     Anxiety     Arthritis     Borderline personality disorder (H)     Cancer (H)     COPD (chronic obstructive pulmonary disease) (H)     Depression     Depressive disorder     History of COVID-19     Hyperlipidemia     Hypertension     Hypertension     Hyponatremia     Hypothyroidism     Malignant neoplasm of thyroid gland (H)     Chen Null MD  She had R hemithyroidectomy for a right neck tumor in 1994. According to the patient, the tumor was benign. She is not sure whether or not the tumor belonged to the thyroid gland. The surgery was done here at the [facility]. In January 2011, she was diagnosed with kidney stones. She was found to have an elevated calcium level of 11.7, with a PTH level of 368. Stone an    Primary hyperparathyroidism (H24)           PTSD (post-traumatic stress disorder)     Pulmonary embolism with infarction (H) 01/12/2021    Recurrent otitis media     Seizure disorder (H)     Stroke (H)     Tobacco abuse     Vertigo      Past Surgical History:   Procedure Laterality Date    ABDOMEN SURGERY      ADRENALECTOMY Left     BRAIN SURGERY      CRANIOTOMY FOR TEMPORAL LOBECTOMY Right     x3 for seizure    DILATION AND CURETTAGE      HEAD & NECK SURGERY      HYSTERECTOMY, PAP NO LONGER INDICATED N/A     LITHOTRIPSY      PARATHYROID GLAND SURGERY      resection of adenoma    REFRACTIVE SURGERY Bilateral     RELEASE CARPAL TUNNEL Bilateral     TONSILLECTOMY & ADENOIDECTOMY      TOTAL THYROIDECTOMY      TOTAL VAGINAL HYSTERECTOMY      39 years old. Benign     Current Outpatient Medications   Medication Sig Dispense Refill    albuterol (PROAIR HFA) 108 (90 Base) MCG/ACT inhaler Inhale 2 puffs into the lungs every 4 hours as needed for shortness of breath or wheezing 8 g 4    ARIPiprazole (ABILIFY) 10 MG tablet Take 1 tablet  (10 mg) by mouth every morning 30 tablet 4    azelastine (ASTELIN) 0.1 % nasal spray Spray 1 spray into both nostrils 2 times daily 30 mL 1    Brivaracetam (BRIVIACT) 100 MG tablet Take 150 mg by mouth 2 times daily       calcitonin, salmon, (MIACALCIN) 200 UNIT/ACT nasal spray INSERT 1 SPRAY INTO 1 NOSTRIL, ALTERNATING NOSTRILS EACH DAY 3.7 mL 0    Calcium Citrate-Vitamin D (CALCIUM + D PO) Take 1 tablet by mouth daily       carBAMazepine (TEGRETOL) 200 MG tablet 200mg in the  at lunch and 400mg at HS      Cyanocobalamin (VITAMIN B12) 1000 MCG TBCR Take 1 tablet by mouth      denosumab (PROLIA) 60 MG/ML SOSY injection Inject 60 mg Subcutaneous once Per pt report      DULoxetine (CYMBALTA) 60 MG capsule Take 2 capsules (120 mg) by mouth daily 180 capsule 3    EPINEPHrine (ANY BX GENERIC EQUIV) 0.3 MG/0.3ML injection 2-pack Inject 0.3 mLs (0.3 mg) into the muscle as needed for anaphylaxis 2 each 1    esomeprazole (NEXIUM) 40 MG DR capsule TAKE 1 CAPSULE BY MOUTH EVERY MORNING BEFORE BREAKFAST (TAKE 30-60 MINUTES BEFORE EATING) 28 capsule 2    fexofenadine (ALLEGRA) 180 MG tablet TAKE 1 TABLET BY MOUTH DAILY 90 tablet 3    fluticasone (FLONASE) 50 MCG/ACT nasal spray INHALE 1 SPRAY IN EACH NOSTRIL DAILY Strength: 50 MCG/ACT 16 g 11    hydrochlorothiazide (HYDRODIURIL) 25 MG tablet 2 times daily      levothyroxine (SYNTHROID/LEVOTHROID) 300 MCG tablet Take 1 tablet (300 mcg) by mouth daily 90 tablet 3    losartan (COZAAR) 25 MG tablet Take 0.5 tablets (12.5 mg) by mouth daily 90 tablet 1    meclizine (ANTIVERT) 25 MG tablet       melatonin 3 MG tablet Take 1 tablet (3 mg) by mouth nightly as needed for sleep 90 tablet 4    multivitamin with iron (CHROMAGEN) TABS half-tab Take 1 tablet by mouth daily      Nutritional Supplements (NUTRITIONAL SHAKE HIGH PROTEIN) LIQD Take 1 each by mouth every morning Dispense Premier protein shakes. Replace breakfast with 1 shake daily. 9900 mL 11    nystatin (MYCOSTATIN) 643334  UNIT/GM external cream Apply topically 2 times daily as needed (skin infection/inflammation) (Apply to the most sensitive inflamed areas of skin) 90 g 2    nystatin (MYCOSTATIN) 537937 UNIT/GM external powder Apply topically 3 times daily as needed for other (fungal skin infection - apply to skin in groin) 120 g 1    Polyethylene Glycol POWD See Admin Instructions Use daily as needed      polymixin b-trimethoprim (POLYTRIM) 89182-2.1 UNIT/ML-% ophthalmic solution Place 1-2 drops into the right eye every 4 hours 10 mL 0    prazosin (MINIPRESS) 2 MG capsule Take 1 capsule (2 mg) by mouth at bedtime 90 capsule 1    pregabalin (LYRICA) 50 MG capsule Take 1 capsule (50 mg) by mouth 3 times daily 90 capsule 2    QUEtiapine (SEROQUEL) 25 MG tablet Take 1-2 tablets (25-50 mg) by mouth daily For agitation. 180 tablet 4    rosuvastatin (CRESTOR) 20 MG tablet TAKE 1 TABLET BY MOUTH DAILY 28 tablet 12    salmeterol (SEREVENT DISKUS) 50 MCG/ACT inhaler INHALE 1 PUFF INTO THE LUNGS TWICE A DAY 60 each 5    senna (SENNA-TIME) 8.6 MG tablet TAKE 2 TABLETS (17.2MG) BY MOUTH TWICE A  tablet 11    umeclidinium (INCRUSE ELLIPTA) 62.5 MCG/ACT inhaler Inhale 1 puff into the lungs daily 30 each 2    varenicline (CHANTIX) 1 MG tablet TAKE 1 TABLET BY MOUTH TWICE A DAY 56 tablet 2    Vibegron (GEMTESA) 75 MG TABS tablet Take 75 mg by mouth daily      vitamin D3 (CHOLECALCIFEROL) 50 mcg (2000 units) tablet TAKE 2 TABLETS BY MOUTH DAILY 180 tablet 3    warfarin ANTICOAGULANT (COUMADIN) 5 MG tablet TAKE 2 TABLETS BY MOUTH ON MON AND FRI ; TAKE 2 & 1/2 TABLETS BY MOUTH ALL OTHER DAYS AS DIRECTED BY INR CLINIC 180 tablet 3       Allergies   Allergen Reactions    Aspirin Shortness Of Breath    Bees Anaphylaxis    Lamictal [Lamotrigine] Dizziness    Latex Itching    Phenobarbital      aggression    Vistaril [Hydroxyzine] Other (See Comments)     Sluggish, unable to wake up    Gabapentin Rash        Social History     Tobacco Use    Smoking  "status: Former     Passive exposure: Never    Smokeless tobacco: Never    Tobacco comments:     Quit about 5 months ago on Chantix.     Substance Use Topics    Alcohol use: No     Comment: None.     History   Drug Use No         Review of Systems   Constitutional:  Negative for chills and fever.   HENT:  Negative for congestion, ear pain and sore throat.    Eyes:  Negative for pain and visual disturbance.   Respiratory:  Negative for cough and shortness of breath.    Cardiovascular:  Negative for chest pain, palpitations and peripheral edema.   Gastrointestinal:  Negative for abdominal pain, constipation and diarrhea.   Endocrine: Negative for polyuria.   Genitourinary:  Negative for dysuria, frequency and vaginal discharge.   Musculoskeletal:  Negative for arthralgias and myalgias.   Skin:  Negative for rash.   Neurological:  Negative for dizziness, weakness, headaches and paresthesias.   Hematological:  Does not bruise/bleed easily.   Psychiatric/Behavioral: Negative.         Objective    /83 (BP Location: Left arm, Patient Position: Sitting, Cuff Size: Adult Large)   Pulse 83   Temp 98.3  F (36.8  C) (Oral)   Resp 18   Ht 1.676 m (5' 6\")   Wt (!) 161.6 kg (356 lb 3.2 oz)   LMP  (LMP Unknown)   SpO2 94%   BMI 57.49 kg/m     Estimated body mass index is 57.49 kg/m  as calculated from the following:    Height as of this encounter: 1.676 m (5' 6\").    Weight as of this encounter: 161.6 kg (356 lb 3.2 oz).  Physical Exam  Vitals reviewed.   Constitutional:       General: She is not in acute distress.     Appearance: Normal appearance. She is not ill-appearing.   HENT:      Head: Normocephalic and atraumatic.      Right Ear: External ear normal.      Left Ear: External ear normal.      Nose: Nose normal.      Mouth/Throat:      Pharynx: Oropharynx is clear. No oropharyngeal exudate or posterior oropharyngeal erythema.   Eyes:      General: No scleral icterus.        Right eye: No discharge.         Left " eye: No discharge.      Extraocular Movements: Extraocular movements intact.      Conjunctiva/sclera: Conjunctivae normal.      Pupils: Pupils are equal, round, and reactive to light.   Neck:      Comments: No thyromegaly.  Cardiovascular:      Rate and Rhythm: Normal rate and regular rhythm.      Heart sounds: Normal heart sounds. No murmur heard.     No friction rub. No gallop.   Pulmonary:      Effort: Pulmonary effort is normal. No respiratory distress.      Breath sounds: Normal breath sounds. No wheezing or rales.   Abdominal:      General: There is no distension.      Palpations: Abdomen is soft. There is no mass.      Tenderness: There is no abdominal tenderness.   Musculoskeletal:         General: No signs of injury. Normal range of motion.      Cervical back: Normal range of motion.      Right lower leg: No edema.      Left lower leg: No edema.   Lymphadenopathy:      Cervical: No cervical adenopathy.   Skin:     General: Skin is warm.      Coloration: Skin is not jaundiced.      Findings: No rash.   Neurological:      General: No focal deficit present.      Mental Status: She is alert and oriented to person, place, and time.      Cranial Nerves: No cranial nerve deficit.      Deep Tendon Reflexes: Reflexes normal.   Psychiatric:         Mood and Affect: Mood normal.       Recent Labs   Lab Test 03/25/24  1107 03/18/24  1215 02/07/24  0000 01/30/24  1001 11/15/23  1437 11/14/23  1457 09/12/23  1419 09/07/23  1211 09/01/22  1401 08/18/22  1405   HGB  --   --   --   --   --   --   --  13.6  --  14.4   PLT  --   --   --   --   --   --   --   --   --  245   INR 2.5* 1.1   < >  --    < >  --    < >  --    < >  --    NA  --   --   --  143  --  141  --   --    < > 141   POTASSIUM  --   --   --  4.0  --  4.0  --   --    < > 4.0   CR  --   --   --  0.66  --  0.65  --   --    < > 0.78   A1C  --   --   --  5.2  --   --   --   --   --   --     < > = values in this interval not displayed.     Diagnostics  No labs were  ordered during this visit.   No EKG required for low risk surgery (cataract, skin procedure, breast biopsy, etc).    Revised Cardiac Risk Index (RCRI)  The patient has the following serious cardiovascular risks for perioperative complications:   - Cerebrovascular Disease (TIA or CVA) = 1 point     RCRI Interpretation: 1 point: Class II (low risk - 0.9% complication rate)         Signed Electronically by: Jose Maria Morin MD  Copy of this evaluation report is provided to requesting physician.

## 2024-03-27 NOTE — PATIENT INSTRUCTIONS
Preparing for Your Surgery  Getting started  A nurse will call you to review your health history and instructions. They will give you an arrival time based on your scheduled surgery time. Please be ready to share:  Your doctor's clinic name and phone number  Your medical, surgical, and anesthesia history  A list of allergies and sensitivities  A list of medicines, including herbal treatments and over-the-counter drugs  Whether the patient has a legal guardian (ask how to send us the papers in advance)  Please tell us if you're pregnant--or if there's any chance you might be pregnant. Some surgeries may injure a fetus (unborn baby), so they require a pregnancy test. Surgeries that are safe for a fetus don't always need a test, and you can choose whether to have one.   If you have a child who's having surgery, please ask for a copy of Preparing for Your Child's Surgery.    Preparing for surgery  Within 10 to 30 days of surgery: Have a pre-op exam (sometimes called an H&P, or History and Physical). This can be done at a clinic or pre-operative center.  If you're having a , you may not need this exam. Talk to your care team.  At your pre-op exam, talk to your care team about all medicines you take. If you need to stop any medicines before surgery, ask when to start taking them again.  We do this for your safety. Many medicines can make you bleed too much during surgery. Some change how well surgery (anesthesia) drugs work.  Call your insurance company to let them know you're having surgery. (If you don't have insurance, call [phone].)  Call your clinic if there's any change in your health. This includes signs of a cold or flu (sore throat, runny nose, cough, rash, fever). It also includes a scrape or scratch near the surgery site.  If you have questions on the day of surgery, call your hospital or surgery center.  Eating and drinking guidelines  For your safety: Unless your surgeon tells you otherwise,  follow the guidelines below.  Eat and drink as usual until 8 hours before you arrive for surgery. After that, no food or milk.  Drink clear liquids until 2 hours before you arrive. These are liquids you can see through, like water, Gatorade, and Propel Water. They also include plain black coffee and tea (no cream or milk), candy, and breath mints. You can spit out gum when you arrive.  If you drink alcohol: Stop drinking it the night before surgery.  If your care team tells you to take medicine on the morning of surgery, it's okay to take it with a sip of water.  Preventing infection  Shower or bathe the night before and morning of your surgery. Follow the instructions your clinic gave you. (If no instructions, use regular soap.)  Don't shave or clip hair near your surgery site. We'll remove the hair if needed.  Don't smoke or vape the morning of surgery. You may chew nicotine gum up to 2 hours before surgery. A nicotine patch is okay.  Note: Some surgeries require you to completely quit smoking and nicotine. Check with your surgeon.  Your care team will make every effort to keep you safe from infection. We will:  Clean our hands often with soap and water (or an alcohol-based hand rub).  Clean the skin at your surgery site with a special soap that kills germs.  Give you a special gown to keep you warm. (Cold raises the risk of infection.)  Wear special hair covers, masks, gowns and gloves during surgery.  Give antibiotic medicine, if prescribed. Not all surgeries need antibiotics.  What to bring on the day of surgery  Photo ID and insurance card  Copy of your health care directive, if you have one  Glasses and hearing aids (bring cases)  You can't wear contacts during surgery  Inhaler and eye drops, if you use them (tell us about these when you arrive)  CPAP machine or breathing device, if you use them  A few personal items, if spending the night  If you have . . .  A pacemaker, ICD (cardiac defibrillator) or other  implant: Bring the ID card.  An implanted stimulator: Bring the remote control.  A legal guardian: Bring a copy of the certified (court-stamped) guardianship papers.  Please remove any jewelry, including body piercings. Leave jewelry and other valuables at home.  If you're going home the day of surgery  You must have a responsible adult drive you home. They should stay with you overnight as well.  If you don't have someone to stay with you, and you aren't safe to go home alone, we may keep you overnight. Insurance often won't pay for this.  After surgery  If it's hard to control your pain or you need more pain medicine, please call your surgeon's office.  Questions?   If you have any questions for your care team, list them here: _________________________________________________________________________________________________________________________________________________________________________ ____________________________________ ____________________________________ ____________________________________  For informational purposes only. Not to replace the advice of your health care provider. Copyright   2003, 2019 [facility]. All rights reserved. Clinically reviewed by Viky Valdes MD. [employer] 372111 - REV 12/22.    How to Take Your Medication Before Surgery  - Take all of your medications before surgery as usual

## 2024-03-27 NOTE — ASSESSMENT & PLAN NOTE
Preoperative assessment.  She is at baseline health.  Some symptoms in her right eye that might be consistent with conjunctivitis (viral versus bacterial).  Will start Polytrim in that eye.  If her symptoms persist later in the week or on Monday of next week (4/1) we will need to delay her cataract potentially.  We will check with her eye surgeon.  Otherwise cleared for surgery without additional recommendations of diagnostic testing or evaluation.  She should take all of her medicines that she normally would.

## 2024-04-01 ENCOUNTER — OFFICE VISIT (OUTPATIENT)
Dept: SLEEP MEDICINE | Facility: CLINIC | Age: 64
End: 2024-04-01
Payer: COMMERCIAL

## 2024-04-01 VITALS
DIASTOLIC BLOOD PRESSURE: 85 MMHG | HEIGHT: 66 IN | OXYGEN SATURATION: 94 % | WEIGHT: 293 LBS | BODY MASS INDEX: 47.09 KG/M2 | HEART RATE: 90 BPM | SYSTOLIC BLOOD PRESSURE: 139 MMHG

## 2024-04-01 DIAGNOSIS — G47.33 OBSTRUCTIVE SLEEP APNEA (ADULT) (PEDIATRIC): Primary | ICD-10-CM

## 2024-04-01 PROCEDURE — 99215 OFFICE O/P EST HI 40 MIN: CPT | Performed by: NURSE PRACTITIONER

## 2024-04-01 RX ORDER — ZOLPIDEM TARTRATE 5 MG/1
TABLET ORAL
Qty: 1 TABLET | Refills: 0 | Status: SHIPPED | OUTPATIENT
Start: 2024-04-01

## 2024-04-01 ASSESSMENT — SLEEP AND FATIGUE QUESTIONNAIRES
HOW LIKELY ARE YOU TO NOD OFF OR FALL ASLEEP WHEN YOU ARE A PASSENGER IN A CAR FOR AN HOUR WITHOUT A BREAK: HIGH CHANCE OF DOZING
HOW LIKELY ARE YOU TO NOD OFF OR FALL ASLEEP WHILE SITTING INACTIVE IN A PUBLIC PLACE: WOULD NEVER DOZE
HOW LIKELY ARE YOU TO NOD OFF OR FALL ASLEEP WHILE SITTING QUIETLY AFTER LUNCH WITHOUT ALCOHOL: MODERATE CHANCE OF DOZING
HOW LIKELY ARE YOU TO NOD OFF OR FALL ASLEEP WHILE LYING DOWN TO REST IN THE AFTERNOON WHEN CIRCUMSTANCES PERMIT: HIGH CHANCE OF DOZING
HOW LIKELY ARE YOU TO NOD OFF OR FALL ASLEEP WHILE SITTING AND READING: HIGH CHANCE OF DOZING
HOW LIKELY ARE YOU TO NOD OFF OR FALL ASLEEP WHILE WATCHING TV: MODERATE CHANCE OF DOZING
HOW LIKELY ARE YOU TO NOD OFF OR FALL ASLEEP WHILE SITTING AND TALKING TO SOMEONE: WOULD NEVER DOZE
HOW LIKELY ARE YOU TO NOD OFF OR FALL ASLEEP IN A CAR, WHILE STOPPED FOR A FEW MINUTES IN TRAFFIC: MODERATE CHANCE OF DOZING

## 2024-04-01 NOTE — PATIENT INSTRUCTIONS
Drive Safe... Drive Alive     Sleep health profoundly affects your health, mood, and your safety. 33% of the population (one in three of us) is not getting enough sleep and many have a sleep disorder. Not getting enough sleep or having an untreated / undertreated sleep condition may make us sleepy without even knowing it. In fact, our driving could be dramatically impaired due to our sleep health. As your provider, here are some things I would like you to know about driving:     Here are some warning signs for impairment and dangerous drowsy driving:              -Having been awake more than 16 hours               -Looking tired               -Eyelid drooping              -Head nodding (it could be too late at this point)              -Driving for more than 30 minutes     Some things you could do to make the driving safer if you are experiencing some drowsiness:              -Stop driving and rest              -Call for transportation              -Make sure your sleep disorder is adequately treated     Some things that have been shown NOT to work when experiencing drowsiness while driving:              -Turning on the radio              -Opening windows              -Eating any  distracting  /  entertaining  foods (e.g., sunflower seeds, candy, or any other)              -Talking on the phone      Your decision may not only impact your life, but also the life of others. Please, remember to drive safe for yourself and all of us. Your BMI is Body mass index is 58.04 kg/m .    What is BMI?  Body mass index (BMI) is one way to tell whether you are at a healthy weight, overweight, or obese. It measures your weight in relation to your height.  A BMI of 18.5 to 24.9 is in the healthy range. A person with a BMI of 25 to 29.9 is considered overweight, and someone with a BMI of 30 or greater is considered obese.  Another way to find out if you are at risk for health problems caused by overweight and obesity is to measure  your waist. If you are a woman and your waist is more than 35 inches, or if you are a man and your waist is more than 40 inches, your risk of disease may be higher.  More than two-thirds of [place] adults are considered overweight or obese. Being overweight or obese increases the risk for further weight gain.  Excess weight may lead to heart disease and diabetes. Creating and following plans for healthy eating and physical activity may help you improve your health.    Methods for maintaining or losing weight.  Weight control is part of healthy lifestyle and includes exercise, emotional health, and healthy eating habits.  Careful eating habits lifelong is the mainstay of weight control.  Though there are significant health benefits from weight loss, long-term weight loss with diet alone may be very difficult to achieve- studies show long-term success with dietary management in less than 10% of people. Attaining a healthy weight may be especially difficult to achieve in those with severe obesity. In some cases, medications, devices and surgical management might be considered.    What can you do?  If you are overweight or obese and are interested in methods for weight loss, you should discuss this with your provider. In addition, we recommend that you review healthy life styles and methods for weight loss available through the [employer] patient information sites:   http://win.niddk.nih.gov/publications/index.htm

## 2024-04-01 NOTE — PROGRESS NOTES
Outpatient Sleep Medicine Consultation:      Name: Valentina Olmedo MRN# [id number]   Age: 64 year old YOB: 1960     Date of Consultation: April 1, 2024  Consultation is requested by: Jose Maria Morin MD  [address] [place] [address] Jose Maria Morin  Primary care provider: Jose Maria Morin       Reason for Sleep Consult:     Valentina Olmedo is sent by Jose Maria Morin for a sleep consultation regarding CHRIS, currently untreated.    Patient s Reason for visit  Valentina Olmedo main reason for visit:    Patient states problem(s) started:    Valentina Olmedo's goals for this visit:             Assessment and Plan:     Summary Sleep Diagnoses and Recommendations:  Obstructive sleep apnea, severity unknown    Comorbid Diagnoses:  Hypertension  Closed head injury  Thyroid cancer  Adrenal mass  Class III obesity with comorbidities  Non-intractable epilepsy  GERD  Hyperlipidemia  Hypothyroidism  COPD  Schizoaffective disorder, depressive type  Migraine headaches  Major depressive disorder  PTSD  Seasonal affective disorder  Pulmonary embolism, by history      Summary Counseling:    Sleep Testing Reviewed  Obstructive Sleep Apnea Reviewed  Complications of Untreated Sleep Apnea Reviewed      Patient will follow up with a [employer] message after completing sleep study.  Patient will be contacted and therapy will be initiated if indicated  SHAHRZAD Tavarez, CNP    Total time spent reviewing medical records, history and physical examination, review of previous testing and interpretation as well as documentation on this date: 45 minutes    CC: Jose Maria Morin          History of Present Illness:     Valentina Olmedo presents to the sleep medicine clinic with concerns of obstructive sleep apnea.  Would like to return to CPAP usage.    Patient has a past medical history notable for severe obstructive sleep apnea, previously treated with CPAP therapy, hypertension, hypothyroidism, GERD, schizoaffective  disorder, anxiety disorder, PTSD, history of pulmonary embolism, on long-term anticoagulation medication, morbid obesity, previous tobacco usage.  Patient is disabled as she has limited mobility.  She uses a walker for short distances and a wheelchair for longer distances.    Sleep study Suresh  5 unintentional naps per day  10 pm retires to sleep  Sleep latency ~ 60 min  830 am wake-up time with the use of an alarm  Does not work outside of home  Disabled  Broke her back, now in w/c  Tylenol is what she uses for pain, unable to use NSAIDs  Warfarin for pulm emboli, long-term use  Prazosin PTSD   Losartan for BP  Physically deconditioned  Significant tobacco use in the past.  1/2 pack/day for 45 years.  Quit smoking February, 2023.    Plan for Valentina is to repeat a PSG, and a split-night fashion after which she will notify me in a [employer] message that she has completed her sleep study.  I will then notify her of her results as they become available and we will go ahead and place a comprehensive order for replacement CPAP machine as well as needed supplies and equipment for the coming year.  She is in agreement with this plan.    [place] Sleepiness Scale  Total score - [place]: 15   (Less than 10 normal)       Insomnia Severity Scale  TORY Total Score: 16  (normal 0-7, mild 8-14, moderate 15-21, severe 22-28)        STOP-BANG/known obstructive sleep apnea  Patient states she does occasionally use CPAP therapy.  Barriers to CPAP therapy usage include her mask selection.  She states her mask is uncomfortable around her nose.  She has a feeling of suffocation.  She uses a fullface mask.    Patient has not used her CPAP therapy in several years.  Plan is for her to obtain another sleep study in order to qualify for a replacement CPAP machine and determine the best settings for her in order to treat her severe obstructive sleep apnea.    Social History:   to René, who is her bed partner.  René is present for this  appointment today.      Past Sleep Evaluations:  Sleep apnea   PSG ( 2/3/2021) AHI 91, SpO2 sheri 78.6%, 9.2 minutes <= 88%. AutoCPAP.          SCALES:    EPWORTH SLEEPINESS SCALE         4/1/2024    12:49 PM    [place] Sleepiness Scale ( SUSAN Lopez  [id number]<br>ESS - [place]/English - Final version - 21 Nov 07 - [facility] [place].)   Sitting and reading High chance of dozing   Watching TV Moderate chance of dozing   Sitting, inactive in a public place (e.g. a theatre or a meeting) Would never doze   As a passenger in a car for an hour without a break High chance of dozing   Lying down to rest in the afternoon when circumstances permit High chance of dozing   Sitting and talking to someone Would never doze   Sitting quietly after a lunch without alcohol Moderate chance of dozing   In a car, while stopped for a few minutes in traffic Moderate chance of dozing   [place] Score (MC) 15   [place] Score (Sleep) 15         INSOMNIA SEVERITY INDEX (TORY)          4/1/2024    12:35 PM   Insomnia Severity Index (TORY)   Difficulty falling asleep 2   Difficulty staying asleep 3   Problems waking up too early 1   How SATISFIED/DISSATISFIED are you with your CURRENT sleep pattern? 2   How NOTICEABLE to others do you think your sleep problem is in terms of impairing the quality of your life? 2   How WORRIED/DISTRESSED are you about your current sleep problem? 2   To what extent do you consider your sleep problem to INTERFERE with your daily functioning (e.g. daytime fatigue, mood, ability to function at work/daily chores, concentration, memory, mood, etc.) CURRENTLY? 4   TORY Total Score 16       Guidelines for Scoring/Interpretation:  Total score categories:  0-7 = No clinically significant insomnia   8-14 = Subthreshold insomnia   15-21 = Clinical insomnia (moderate severity)  22-28 = Clinical insomnia (severe)  Used via courtesy of www.Quirkyth.va.gov with permission from Lee Lorenzo PhD., [facility]      ADAIR BALDWIN  "Known Obstructive Sleep Apnea        4/1/2024    12:45 PM   STOP BANG Questionnaire (  2008, the American Society of Anesthesiologists, Inc. [employer]   B/P Clinic: 139/85   BMI Clinic: 58.04         GAD7        2/6/2024     3:01 PM   RUY-7    1. Feeling nervous, anxious, or on edge 1   2. Not being able to stop or control worrying 2   3. Worrying too much about different things 2   4. Trouble relaxing 1   5. Being so restless that it is hard to sit still 1   6. Becoming easily annoyed or irritable 2   7. Feeling afraid, as if something awful might happen 3   RUY-7 Total Score 12   If you checked any problems, how difficult have they made it for you to do your work, take care of things at home, or get along with other people? Somewhat difficult         CAGE-AID        1/5/2024     2:00 PM   CAGE-AID Flowsheet   Have you ever felt you should Cut down on your drinking or drug use?    Have people Annoyed you by criticizing your drinking or drug use?    Have you ever felt bad or Guilty about your drinking or drug use?    Have you ever had a drink or used drugs first thing in the morning to steady your nerves or to get rid of a hangover? (Eye opener)    CAGE-AID SCORE        Information is confidential and restricted. Go to Review Flowsheets to unlock data.       CAGE-AID reprinted with permission from the [place] Medical Journal, CELINE Hsu. and SABINO Ovalles, \"Conjoint screening questionnaires for alcohol and drug abuse\" [place] Medical Journal 94: 135-140, 1995.      PATIENT HEALTH QUESTIONNAIRE-9 (PHQ - 9)        3/27/2024    10:26 AM   PHQ-9 ([employer])   1.  Little interest or pleasure in doing things 1   2.  Feeling down, depressed, or hopeless 0   3.  Trouble falling or staying asleep, or sleeping too much 0   4.  Feeling tired or having little energy 2   5.  Poor appetite or overeating 1   6.  Feeling bad about yourself - or that you are a failure or have let yourself or your family down " 1   7.  Trouble concentrating on things, such as reading the newspaper or watching television 3   8.  Moving or speaking so slowly that other people could have noticed. Or the opposite - being so fidgety or restless that you have been moving around a lot more than usual 1   9.  Thoughts that you would be better off dead, or of hurting yourself in some way 1   PHQ-9 Total Score 10   6.  Feeling bad about yourself 1   7.  Trouble concentrating 3   8.  Moving slowly or restless 1   9.  Suicidal or self-harm thoughts 1   In the past two weeks have you had thoughts of suicide or self harm? No   Do you have concerns about your personal safety or the safety of others? Yes   1.  Little interest or pleasure in doing things Several days   2.  Feeling down, depressed, or hopeless Not at all   3.  Trouble falling or staying asleep, or sleeping too much Not at all   4.  Feeling tired or having little energy More than half the days   5.  Poor appetite or overeating Several days   6.  Feeling bad about yourself Several days   7.  Trouble concentrating Nearly every day   8.  Moving slowly or restless Several days   9.  Suicidal or self-harm thoughts Several days   PHQ-9 via [employer] TOTAL SCORE-----> 10 (Moderate depression)   Difficulty at work, home, or with people Not difficult at all   F/U: Thoughts of suicide or self harm? No   F/U: Safety concerns for self or others? Yes       Developed by Daron Garcia, Dorothy Piedra, Kevin Ray and colleagues, with an educational francisco from [employer] No permission required to reproduce, translate, display or distribute.        Allergies:    Allergies   Allergen Reactions    Aspirin Shortness Of Breath    Bees Anaphylaxis    Lamictal [Lamotrigine] Dizziness    Latex Itching    Phenobarbital      aggression    Vistaril [Hydroxyzine] Other (See Comments)     Sluggish, unable to wake up    Gabapentin Rash       Medications:    Current Outpatient Medications   Medication Sig  Dispense Refill    albuterol (PROAIR HFA) 108 (90 Base) MCG/ACT inhaler Inhale 2 puffs into the lungs every 4 hours as needed for shortness of breath or wheezing 8 g 4    ARIPiprazole (ABILIFY) 10 MG tablet Take 1 tablet (10 mg) by mouth every morning 30 tablet 4    azelastine (ASTELIN) 0.1 % nasal spray Spray 1 spray into both nostrils 2 times daily 30 mL 1    Brivaracetam (BRIVIACT) 100 MG tablet Take 150 mg by mouth 2 times daily       calcitonin, salmon, (MIACALCIN) 200 UNIT/ACT nasal spray INSERT 1 SPRAY INTO 1 NOSTRIL, ALTERNATING NOSTRILS EACH DAY 3.7 mL 0    Calcium Citrate-Vitamin D (CALCIUM + D PO) Take 1 tablet by mouth daily       carBAMazepine (TEGRETOL) 200 MG tablet 200mg in the  at lunch and 400mg at HS      Cyanocobalamin (VITAMIN B12) 1000 MCG TBCR Take 1 tablet by mouth      denosumab (PROLIA) 60 MG/ML SOSY injection Inject 60 mg Subcutaneous once Per pt report      DULoxetine (CYMBALTA) 60 MG capsule Take 2 capsules (120 mg) by mouth daily 180 capsule 3    EPINEPHrine (ANY BX GENERIC EQUIV) 0.3 MG/0.3ML injection 2-pack Inject 0.3 mLs (0.3 mg) into the muscle as needed for anaphylaxis 2 each 1    esomeprazole (NEXIUM) 40 MG DR capsule TAKE 1 CAPSULE BY MOUTH EVERY MORNING BEFORE BREAKFAST (TAKE 30-60 MINUTES BEFORE EATING) 28 capsule 2    fexofenadine (ALLEGRA) 180 MG tablet TAKE 1 TABLET BY MOUTH DAILY 90 tablet 3    fluticasone (FLONASE) 50 MCG/ACT nasal spray INHALE 1 SPRAY IN EACH NOSTRIL DAILY Strength: 50 MCG/ACT 16 g 11    hydrochlorothiazide (HYDRODIURIL) 25 MG tablet 2 times daily      levothyroxine (SYNTHROID/LEVOTHROID) 300 MCG tablet Take 1 tablet (300 mcg) by mouth daily 90 tablet 3    losartan (COZAAR) 25 MG tablet Take 0.5 tablets (12.5 mg) by mouth daily 90 tablet 1    meclizine (ANTIVERT) 25 MG tablet       melatonin 3 MG tablet Take 1 tablet (3 mg) by mouth nightly as needed for sleep 90 tablet 4    multivitamin with iron (CHROMAGEN) TABS half-tab Take 1 tablet by mouth  daily      nystatin (MYCOSTATIN) 790787 UNIT/GM external cream Apply topically 2 times daily as needed (skin infection/inflammation) (Apply to the most sensitive inflamed areas of skin) 90 g 2    Polyethylene Glycol POWD See Admin Instructions Use daily as needed      polymixin b-trimethoprim (POLYTRIM) 69448-0.1 UNIT/ML-% ophthalmic solution Place 1-2 drops into the right eye every 4 hours 10 mL 0    prazosin (MINIPRESS) 2 MG capsule Take 1 capsule (2 mg) by mouth at bedtime 90 capsule 1    pregabalin (LYRICA) 50 MG capsule Take 1 capsule (50 mg) by mouth 3 times daily 90 capsule 2    QUEtiapine (SEROQUEL) 25 MG tablet Take 1-2 tablets (25-50 mg) by mouth daily For agitation. 180 tablet 4    rosuvastatin (CRESTOR) 20 MG tablet TAKE 1 TABLET BY MOUTH DAILY 28 tablet 12    salmeterol (SEREVENT DISKUS) 50 MCG/ACT inhaler INHALE 1 PUFF INTO THE LUNGS TWICE A DAY 60 each 5    senna (SENNA-TIME) 8.6 MG tablet TAKE 2 TABLETS (17.2MG) BY MOUTH TWICE A  tablet 11    umeclidinium (INCRUSE ELLIPTA) 62.5 MCG/ACT inhaler Inhale 1 puff into the lungs daily 30 each 2    varenicline (CHANTIX) 1 MG tablet TAKE 1 TABLET BY MOUTH TWICE A DAY 56 tablet 2    Vibegron (GEMTESA) 75 MG TABS tablet Take 75 mg by mouth daily      vitamin D3 (CHOLECALCIFEROL) 50 mcg (2000 units) tablet TAKE 2 TABLETS BY MOUTH DAILY 180 tablet 3    warfarin ANTICOAGULANT (COUMADIN) 5 MG tablet TAKE 2 TABLETS BY MOUTH ON MON AND FRI ; TAKE 2 & 1/2 TABLETS BY MOUTH ALL OTHER DAYS AS DIRECTED BY INR CLINIC 180 tablet 3    zolpidem (AMBIEN) 5 MG tablet Take tablet by mouth 15 minutes prior to sleep, for Sleep Study 1 tablet 0    Nutritional Supplements (NUTRITIONAL SHAKE HIGH PROTEIN) LIQD Take 1 each by mouth every morning Dispense Premier protein shakes. Replace breakfast with 1 shake daily. (Patient not taking: Reported on 4/1/2024) 9900 mL 11    nystatin (MYCOSTATIN) 661733 UNIT/GM external powder Apply topically 3 times daily as needed for other  (fungal skin infection - apply to skin in groin) (Patient not taking: Reported on 4/1/2024) 120 g 1       Problem List:  Patient Active Problem List    Diagnosis Date Noted    Age-related cataract of both eyes, unspecified age-related cataract type 03/27/2024     Priority: Medium    Intertrigo 01/03/2024     Priority: Medium    Dysfunction of both eustachian tubes 01/03/2024     Priority: Medium    Medial knee pain, right 11/17/2023     Priority: Medium    Bilateral sensorineural hearing loss 11/17/2023     Priority: Medium    History of pulmonary embolism 07/28/2023     Priority: Medium    Problem related to housing and economic circumstances 07/25/2023     Priority: Medium    Bilateral hand pain 05/26/2023     Priority: Medium    Vision changes 03/09/2023     Priority: Medium    Seasonal affective disorder (H24) 02/19/2023     Priority: Medium    Hx pulmonary embolism 02/07/2023     Priority: Medium    History of melanoma 10/28/2022     Priority: Medium    Long term (current) use of anticoagulants 08/22/2022     Priority: Medium    PTSD (post-traumatic stress disorder) 08/12/2022     Priority: Medium    Idiopathic osteoporosis 10/12/2021     Priority: Medium    Pulmonary embolism with infarction (H) 09/24/2021     Priority: Medium    Peripheral polyneuropathy 08/20/2021     Priority: Medium    Venous stasis 08/08/2021     Priority: Medium    Class 3 severe obesity due to excess calories with serious comorbidity and body mass index (BMI) of 50.0 to 59.9 in adult (H) 08/06/2021     Priority: Medium    Mixed hyperlipidemia 08/06/2021     Priority: Medium    Seasonal and perennial allergic rhinoconjunctivitis of right eye 07/29/2021     Priority: Medium    Essential hypertension 12/22/2020     Priority: Medium    Fracture of vertebra due to osteoporosis with routine healing, subsequent encounter 09/30/2020     Priority: Medium    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 According to the patient, the tumor was benign. She is not sure whether or not the tumor belonged to the thyroid gland. The surgery was done here at the [facility]. In January 2011, she was diagnosed with kidney stones. She was found to have an elevated calcium level of 11.7, with a PTH level of 368. Stone analysis showed calcium oxalate, calcium phosphate and carbonate form of calcium phosphate hydroxyl. She was also found to have vitamin D deficiency with vitamin D levels between 20 and 25 ng per mL and was given 50,000 units ergocalciferol on a weekly basis. She's been taking the high dose vitamin D for over a year now. Most recent labs from October only checked vitamin D3 level which was mildly low at 23.    Thyroid sestamibi scan and neck ultrasound showed enlarged parathyroid adenoma measuring 2.5 cm on the right side. In addition, on the left side, there was a description of a hypoechoic ovoid focus superior and lateral to the left lobe of the thyroid, measuring 1.7 cm, which was considered to represent either a lymph node or another parathyroid adenoma. She underwent completion total thyroidectomy and right superior parathyroidectomy at [facility] with Dr. Keily Gregory on 11/19/11. 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 She is currently on 237  g levothyroxine daily. Last time the dose of levothyroxine was changed was in March 2012. Last TSH from 6/13/12 was 0.42.      Nonintractable epilepsy without status epilepticus (H) 01/07/2020     Priority: Medium    Esophageal reflux 01/07/2020     Priority: Medium    Chronic obstructive pulmonary disease (H) 01/07/2020     Priority: Medium    Schizoaffective disorder, depressive type (H) 02/06/2019     Priority:  Medium    DNR (do not resuscitate) 09/13/2018     Priority: Medium    CHRIS (obstructive sleep apnea) 06/12/2018     Priority: Medium    Major depressive disorder, recurrent episode, moderate (H) 09/29/2015     Priority: Medium    Closed head injury 03/04/2015     Priority: Medium     Created by [facility] Annotation: Apr 2 2012  9:53AM - Mariah Roy: Fell at work.      HLD (hyperlipidemia) 11/12/2013     Priority: Medium    Hypothyroidism 11/12/2013     Priority: Medium    Migraine headache 11/12/2013     Priority: Medium    Adrenal mass, left (H24) 09/17/2013     Priority: Medium        Past Medical/Surgical History:  Past Medical History:   Diagnosis Date    Adrenal mass (H24) 10/12/2015    Chen Null MD  They were incidentally discovered on the CAT scan of the abdomen from December 2011 which was done for evaluation of kidney stones. F/up CT of the abdomen done on 6/26/12 showed a stable 5 mm nodular opacity in the right lower lung lobe, adjacent to the diagram. Bilateral adrenal masses average density measured less than 0 Hounsfield units, consistent with benign etio    Anxiety     Anxiety     Arthritis     Borderline personality disorder (H)     Cancer (H)     COPD (chronic obstructive pulmonary disease) (H)     Depression     Depressive disorder     History of COVID-19     Hyperlipidemia     Hypertension     Hypertension     Hyponatremia     Hypothyroidism     Malignant neoplasm of thyroid gland (H)     Chen Null MD  She had R hemithyroidectomy for a right neck tumor in 1994. According to the patient, the tumor was benign. She is not sure whether or not the tumor belonged to the thyroid gland. The surgery was done here at the [facility]. In January 2011, she was diagnosed with kidney stones. She was found to have an elevated calcium level of 11.7, with a PTH level of 368. Stone an    Primary hyperparathyroidism (H24)           PTSD (post-traumatic stress disorder)      Pulmonary embolism with infarction (H) 01/12/2021    Recurrent otitis media     Seizure disorder (H)     Stroke (H)     Tobacco abuse     Vertigo      Past Surgical History:   Procedure Laterality Date    ABDOMEN SURGERY      ADRENALECTOMY Left     BRAIN SURGERY      CRANIOTOMY FOR TEMPORAL LOBECTOMY Right     x3 for seizure    DILATION AND CURETTAGE      HEAD & NECK SURGERY      HYSTERECTOMY, PAP NO LONGER INDICATED N/A     LITHOTRIPSY      PARATHYROID GLAND SURGERY      resection of adenoma    REFRACTIVE SURGERY Bilateral     RELEASE CARPAL TUNNEL Bilateral     TONSILLECTOMY & ADENOIDECTOMY      TOTAL THYROIDECTOMY      TOTAL VAGINAL HYSTERECTOMY      39 years old. Benign       Social History:  Social History     Socioeconomic History    Marital status:      Spouse name: Not on file    Number of children: Not on file    Years of education: Not on file    Highest education level: Not on file   Occupational History    Not on file   Tobacco Use    Smoking status: Former     Passive exposure: Never    Smokeless tobacco: Never    Tobacco comments:     Quit about 5 months ago on Chantix.     Vaping Use    Vaping Use: Never used   Substance and Sexual Activity    Alcohol use: No     Comment: None.    Drug use: No    Sexual activity: Not Currently     Partners: Male   Other Topics Concern    Not on file   Social History Narrative    Not on file     Social Determinants of Health     Financial Resource Strain: High Risk (1/3/2024)    Financial Resource Strain     Within the past 12 months, have you or your family members you live with been unable to get utilities (heat, electricity) when it was really needed?: Yes   Food Insecurity: High Risk (1/3/2024)    Food Insecurity     Within the past 12 months, did you worry that your food would run out before you got money to buy more?: Yes     Within the past 12 months, did the food you bought just not last and you didn t have money to get more?: Yes   Transportation  "Needs: Low Risk  (1/3/2024)    Transportation Needs     Within the past 12 months, has lack of transportation kept you from medical appointments, getting your medicines, non-medical meetings or appointments, work, or from getting things that you need?: No   Physical Activity: Not on file   Stress: Not on file   Social Connections: Not on file   Interpersonal Safety: Low Risk  (3/27/2024)    Interpersonal Safety     Do you feel physically and emotionally safe where you currently live?: Yes     Within the past 12 months, have you been hit, slapped, kicked or otherwise physically hurt by someone?: No     Within the past 12 months, have you been humiliated or emotionally abused in other ways by your partner or ex-partner?: No   Housing Stability: Low Risk  (1/3/2024)    Housing Stability     Do you have housing? : Yes     Are you worried about losing your housing?: No   Recent Concern: Housing Stability - High Risk (11/17/2023)    Housing Stability     Do you have housing? : No     Are you worried about losing your housing?: Patient refused       Family History:  Family History   Problem Relation Age of Onset    Chronic Obstructive Pulmonary Disease Mother     Other - See Comments Father         estranged    Skin Cancer Father     Lung Cancer Maternal Grandfather 76    Other - See Comments Brother         Missing since 1992    Alcoholism Brother     Diabetes Sister     Depression Sister     Alcoholism Sister     No Known Problems Sister         Half sister       Review of Systems:  A complete review of systems reviewed by me is negative with the exeption of what has been mentioned in the history of present illness.        Physical Examination:  Vitals: /85   Pulse 90   Ht 1.676 m (5' 6\")   Wt (!) 163.1 kg (359 lb 9.6 oz)   LMP  (LMP Unknown)   SpO2 94%   BMI 58.04 kg/m    BMI= Body mass index is 58.04 kg/m .         Physical Exam  Vitals and nursing note reviewed. Exam conducted with a chaperone present. "   Constitutional:       General: She is awake. She is not in acute distress.     Appearance: Normal appearance. She is well-developed and well-groomed. She is morbidly obese. She is not ill-appearing, toxic-appearing or diaphoretic.   HENT:      Head: Normocephalic and atraumatic.      Right Ear: External ear normal.      Left Ear: External ear normal.      Nose: Nose normal.      Mouth/Throat:      Mouth: Mucous membranes are moist.      Pharynx: Oropharynx is clear.   Eyes:      Conjunctiva/sclera: Conjunctivae normal.   Pulmonary:      Effort: Pulmonary effort is normal.   Musculoskeletal:         General: Normal range of motion.      Cervical back: Normal range of motion and neck supple.   Skin:     General: Skin is warm and dry.      Capillary Refill: Capillary refill takes less than 2 seconds.      Coloration: Skin is pale.   Neurological:      General: No focal deficit present.      Mental Status: She is alert and oriented to person, place, and time.   Psychiatric:         Mood and Affect: Mood normal.         Behavior: Behavior normal. Behavior is cooperative.         Thought Content: Thought content normal.         Judgment: Judgment normal.         All Labs Personally Reviewed    Pertinent labs reviewed by patient and myself           Data: All pertinent previous laboratory data reviewed     Recent Labs   Lab Test 01/30/24  1001 11/14/23  1457    141   POTASSIUM 4.0 4.0   CHLORIDE 113* 109*   CO2 20* 21*   ANIONGAP 10 11   * 99   BUN 14.1 23.9*   CR 0.66 0.65   HILARY 8.5* 8.7*       Recent Labs   Lab Test 09/07/23  1211 08/18/22  1405   WBC  --  6.4   RBC  --  4.91   HGB 13.6 14.4   HCT  --  41.6   MCV  --  85   MCH  --  29.3   MCHC  --  34.6   RDW  --  13.5   PLT  --  245       Recent Labs   Lab Test 01/30/24  1001   PROTTOTAL 6.5   ALBUMIN 3.9   BILITOTAL 0.2   ALKPHOS 73   AST 19   ALT 23       TSH   Date Value   01/30/2024 0.26 uIU/mL (L)   09/07/2023 1.30 uIU/mL   03/16/2022 0.59 uIU/mL  "  10/12/2021 0.23 uIU/mL (L)   11/09/2015 1.13 mU/L       No results found for: \"UAMP\", \"UBARB\", \"BENZODIAZEUR\", \"UCANN\", \"UCOC\", \"OPIT\", \"UPCP\"    No results found for: \"IRONSAT\", \"BP31894\", \"ABRIL\"    pH Arterial (no units)   Date Value   09/09/2019 7.60 (HH)     pH (no units)   Date Value   10/12/2021 7.38     pO2 Arterial (mm Hg)   Date Value   09/09/2019 83     pCO2 Arterial (mm Hg)   Date Value   09/09/2019 <19 (LL)     Base Excess/Deficit Arterial (mmol/L)   Date Value   09/09/2019 -0.9       @LABRCNTIPR(phv:4,pco2v:4,po2v:4,hco3v:4,yung:4,o2per:4)@    Echocardiology: No results found for this or any previous visit (from the past 4320 hour(s)).    Chest x-ray:   XR Chest 2 Views 02/11/2024    Narrative  EXAM: XR CHEST 2 VIEWS  LOCATION: [facility]  DATE: 2/11/2024    INDICATION: Cough history of COPD, COUGH  COMPARISON: 09/09/2019    IMPRESSION: Significant respiratory motion artifact on the lateral image. Airspace changes within the right lower lobe suggesting pneumonitis. Visualized left lung is clear. Cardiac slight size is at the upper limits of normal. Mild degenerative changes, thoracic spine.      Chest CT: No results found for this or any previous visit from the past 365 days.      PFT: Most Recent [facility] Pulmonary Function Testing    FVC-Pred   Date Value Ref Range Status   03/15/2022 3.36 L      FVC-Pre   Date Value Ref Range Status   03/15/2022 2.78 L      FVC-%Pred-Pre   Date Value Ref Range Status   03/15/2022 82 %      FEV1-Pre   Date Value Ref Range Status   03/15/2022 2.25 L      FEV1-%Pred-Pre   Date Value Ref Range Status   03/15/2022 85 %      FEV1FVC-Pred   Date Value Ref Range Status   03/15/2022 79 %      FEV1FVC-Pre   Date Value Ref Range Status   03/15/2022 81 %      No results found for: \"20029\"  FEFMax-Pred   Date Value Ref Range Status   03/15/2022 6.55 L/sec      FEFMax-Pre   Date Value Ref Range Status   03/15/2022 5.28 L/sec      FEFMax-%Pred-Pre   Date Value Ref Range Status " "  03/15/2022 80 %      ExpTime-Pre   Date Value Ref Range Status   03/15/2022 7.60 sec      FIFMax-Pre   Date Value Ref Range Status   03/15/2022 2.45 L/sec      FEV1FEV6-Pred   Date Value Ref Range Status   03/15/2022 81 %      FEV1FEV6-Pre   Date Value Ref Range Status   03/15/2022 81 %      No results found for: \"20055\"      Nadeen Sandy, APRN CNP 4/1/2024   Sleep Medicine    This note was written with the assistance of the Dragon voice-dictation technology software. The final document, although reviewed, may contain errors. For corrections, please contact the office.            "

## 2024-04-01 NOTE — NURSING NOTE
"Chief Complaint   Patient presents with    Sleep Apnea       Initial /85   Pulse 90   Ht 1.676 m (5' 6\")   Wt (!) 163.1 kg (359 lb 9.6 oz)   LMP  (LMP Unknown)   SpO2 94%   BMI 58.04 kg/m   Estimated body mass index is 58.04 kg/m  as calculated from the following:    Height as of this encounter: 1.676 m (5' 6\").    Weight as of this encounter: 163.1 kg (359 lb 9.6 oz).    Medication Reconciliation: complete    Neck circumference:  inches / centimeters.    DME: n/a    Sleep study and follow-up appt scheduled.      Nimo Hanks MA  "

## 2024-04-02 ENCOUNTER — TELEPHONE (OUTPATIENT)
Dept: FAMILY MEDICINE | Facility: CLINIC | Age: 64
End: 2024-04-02

## 2024-04-02 ENCOUNTER — THERAPY VISIT (OUTPATIENT)
Dept: PHYSICAL THERAPY | Facility: REHABILITATION | Age: 64
End: 2024-04-02
Payer: COMMERCIAL

## 2024-04-02 ENCOUNTER — ANTICOAGULATION THERAPY VISIT (OUTPATIENT)
Dept: ANTICOAGULATION | Facility: CLINIC | Age: 64
End: 2024-04-02

## 2024-04-02 DIAGNOSIS — Z79.01 LONG TERM (CURRENT) USE OF ANTICOAGULANTS: ICD-10-CM

## 2024-04-02 DIAGNOSIS — S39.012A STRAIN OF LUMBAR PARASPINAL MUSCLE, INITIAL ENCOUNTER: ICD-10-CM

## 2024-04-02 DIAGNOSIS — I26.99 PULMONARY EMBOLISM WITH INFARCTION (H): Primary | ICD-10-CM

## 2024-04-02 DIAGNOSIS — Z86.711 HISTORY OF PULMONARY EMBOLISM: ICD-10-CM

## 2024-04-02 DIAGNOSIS — E66.813 CLASS 3 SEVERE OBESITY DUE TO EXCESS CALORIES WITH SERIOUS COMORBIDITY AND BODY MASS INDEX (BMI) OF 50.0 TO 59.9 IN ADULT (H): ICD-10-CM

## 2024-04-02 DIAGNOSIS — M48.02 CERVICAL STENOSIS OF SPINAL CANAL: ICD-10-CM

## 2024-04-02 DIAGNOSIS — G89.29 CHRONIC INTRACTABLE PAIN: ICD-10-CM

## 2024-04-02 DIAGNOSIS — M54.12 CERVICAL RADICULOPATHY: ICD-10-CM

## 2024-04-02 DIAGNOSIS — E66.01 CLASS 3 SEVERE OBESITY DUE TO EXCESS CALORIES WITH SERIOUS COMORBIDITY AND BODY MASS INDEX (BMI) OF 50.0 TO 59.9 IN ADULT (H): ICD-10-CM

## 2024-04-02 DIAGNOSIS — R53.81 PHYSICAL DECONDITIONING: ICD-10-CM

## 2024-04-02 PROCEDURE — 97162 PT EVAL MOD COMPLEX 30 MIN: CPT | Mod: GP | Performed by: PHYSICAL THERAPIST

## 2024-04-02 PROCEDURE — 97110 THERAPEUTIC EXERCISES: CPT | Mod: GP | Performed by: PHYSICAL THERAPIST

## 2024-04-02 NOTE — PATIENT INSTRUCTIONS
Back exercises for helping improve blood flow and healing with back      X5-10 reps 1-2x/day      X5-10 reps each side 1-2x/day    KEEP up with neck exercises - bending forwards, rotating and tipping your ear side to side x5-10 reps 1-2x/day
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
## 2024-04-02 NOTE — PROGRESS NOTES
Patients Spinal injection has been rescheduled from April 3rd to April 8th.     [facility] consult needed    Patient has been holding warfarin w/o bridge since 3/29    Patient will continue to HOLD warfarin until [facility] contacts patient.     Ashley Parsons, RN, BSN, PHN  Anticoagulation Nurse  [phone]

## 2024-04-02 NOTE — TELEPHONE ENCOUNTER
Patient would like to speak to an INR nurse.  Her spine injectipn for tomorrow has been cancelled.  She can be reached at [phone]

## 2024-04-02 NOTE — PROGRESS NOTES
Warfarin interruption plan for CANDY cancelled (originally 04/03/2024) rescheduled to 4/8/24    Currently Holding warfarin since 3/29 without bridge (3/25/24 encounter for procedure plan)     Indication for Anticoagulation: PE     PE 04/2017  Per 2014 bariatric notes, vague history of blood clotting during pregnancy leading to fetal demise, with recommendation to take ASA with subsequent pregnancies   No hematology testing on file      Seema-Procedure Risk stratification for thromboembolism: moderate (2022 Chest guidelines)     VTE: 2022 CHEST Perioperative Management guidelines suggest against bridging for patients with Hx of VTE as sole clinical indication for warfarin except in high risk stratification patients.       Recommendation  Continue to hold warfarin for rescheduled procedure 4/8    Notify PCP of extended interruption-no specific guideline in setting of extended interruption. Consider continued hold without bridge vs. Addition of enoxaparin prophylaxis dose (40 mg BID) for BMI >= 40) with last dose 24 hours prior to procedure    Evangelina Mercer, [facility]

## 2024-04-03 ENCOUNTER — TELEPHONE (OUTPATIENT)
Dept: FAMILY MEDICINE | Facility: CLINIC | Age: 64
End: 2024-04-03

## 2024-04-03 RX ORDER — ENOXAPARIN SODIUM 100 MG/ML
40 INJECTION SUBCUTANEOUS EVERY 12 HOURS
Qty: 3.2 ML | Refills: 0 | Status: SHIPPED | OUTPATIENT
Start: 2024-04-03 | End: 2024-04-17

## 2024-04-03 NOTE — PROGRESS NOTES
Called patient back and she stated that she currently have her hair done and is requesting a call back around 4:30 PM. Discussed that she will be bridging with enoxaparin while off warfarin and she is requesting to send the rx to [facility] in [place] - She stated that someone will be able to  rx today.    Made aware that [facility] will be sending her information on how to administer enoxaparin via mychart and will discuss with her further later.

## 2024-04-03 NOTE — TELEPHONE ENCOUNTER
[facility] calling with pt has questions for inr nurse. No answer at [phone] number please advise.  , they said no number or name to be left they said they will just do inr on April 9th

## 2024-04-03 NOTE — PROGRESS NOTES
[facility] called and spoke with [facility] nurse who was at the patient's house today and given updated procedural instructions as outlined below.    Made aware that enoxaparin rx was sent to the patient's pharmacy and that mychart instructions was sent to the patient as well.

## 2024-04-03 NOTE — PROGRESS NOTES
Given her history of PE and her almost complete sedentary lifestyle, I think we should offer her a bridge with enoxaparin as you described if patient is agreeable.

## 2024-04-03 NOTE — TELEPHONE ENCOUNTER
Chart reviewed and found Home care Nurse contact information from previous encounter.    Called and spoke with Home Care nurse harish Given updated procedure plan as outlined on anticoagulation encounter dated 4/2/24.  Made aware that instructions was sent to patient via [facility] as well.

## 2024-04-03 NOTE — TELEPHONE ENCOUNTER
Patient Returning Call    Reason for call:  returned call    Information relayed to patient:  patient returning call to INR nurse    Patient has additional questions:  No    [facility] or would you prefer to receive a phone call?:   No preference   Okay to leave a detailed message?: Yes at [phone]

## 2024-04-05 ENCOUNTER — MEDICAL CORRESPONDENCE (OUTPATIENT)
Dept: HEALTH INFORMATION MANAGEMENT | Facility: CLINIC | Age: 64
End: 2024-04-05
Payer: COMMERCIAL

## 2024-04-05 NOTE — PROGRESS NOTES
Assessment:   Valentina Olmedo is a 64 y.o. female with past medical history significant for epilepsy, migraine, polyneuropathy, obstructive sleep apnea, COPD, history of PE, obesity, hyperlipidemia, hypothyroidism, hypertension, osteoporosis (on Prolia) , on warfarin, chronic kidney disease stage III, tobacco abuse, schizoaffective disorder, depression, PTSD, history of thyroid cancer, history of melanoma who presents today for follow-up regarding multiple concerns:  1.  Chronic neck pain with radiation into the right upper extremity with associated numbness, tingling, weakness.  Patient had a CT cervical spine at [facility] February 7, 2023 which showed moderate to severe spinal canal stenosis C4-5, C5-6, and C6-7 and moderate to severe spinal canal stenosis, right at C3-4.  There is also multilevel high-grade foraminal stenosis.  At C4-5 there is a 4 mm calcified lesion in the dorsal spinal canal which is new from 2018, possibly representing a densely mineralized meningioma.  The calcified lesion is not visualized on an MRI cervical spine.  The MRI of the cervical spine showed moderate to severe spinal canal stenosis C5-6 and C6-7 and multilevel high-grade foraminal stenosis.  Patient is following up after a C7-T1 interlaminar epidural steroid injection November 9, 2023 which provided 70% relief of pain lasting close to 3 months.  She is scheduled for a repeat C7-T1 interlaminar epidural steroid injection later this afternoon.  -Patient saw Dr. Jacobsen July 6, 2023 regarding her cervical spine findings.  She recommended weight loss prior to consideration of elective spinal surgery with a goal BMI of less than 40.  2.  Acute on chronic low back pain and thoracic spine pain.  Pain flared up 9-10 weeks ago.  She believes she had a fall at home around that time.  A CT thoracic spine March 8, 2024 was read as showing a subacute L1 compression fracture without retropulsion.  However, I was able to review prior  imaging studies dating back to 2019 which showed an L1 compression fracture with similar vertebral body morphology.  She does not have any new or acute fractures.  3.  Chronic bilateral knee pain.  We will refer to orthopedics.  4.  Peripheral polyneuropathy.  Patient reports that she needs to establish care with a neurologist for her epilepsy and wants them to also evaluate her neuropathy.  Referral was entered.       Plan:     A shared decision making plan was used.  The patient's values and choices were respected.  The following represents what was discussed and decided upon by the physician assistant and the patient.      1.  DIAGNOSTIC TESTS:  - I reviewed the CT thoracic spine from March 2024.  - I reviewed the lumbar spine x-rays from March 2024.  -I reviewed the  CT cervical spine from [facility] from February 7, 2023.  - I reviewed the MRI cervical spine with and without contrast from [facility] from July 2023.  - We discussed this patient's case at spine rounds in July 2023.  Recommendation is for a repeat CT cervical spine in 1 year to monitor the densely calcified lesion.  This is scheduled for July 24, 2024.  - Also reviewed an MRI lumbar spine from 2020 which showed the L1 compression deformity.  Also reviewed a report of a CT abdomen pelvis from 2019 which described an L1 compression deformity.      2.  PHYSICAL THERAPY: Patient has had off-and-on physical therapy for many years.  Most recent course of physical therapy outpatient was August 2022.  She also had home care PT and OT 2023.  She should continue home exercises.    3.  MEDICATIONS: No changes are made to the patient's medications.  Patient sees the pain clinic.  - Patient can continue Tylenol as needed.  - Patient takes pregabalin 50 mg 3 times daily.  - Patient takes duloxetine 120 mg daily for mood.  - Patient cannot take NSAIDs since she is on warfarin.    4.  INTERVENTIONS: Patient will proceed with the repeat C7-T1  interlaminar epidural steroid injection later this afternoon.    5.  REFERRALS:  - Entered a referral to orthopedics for her bilateral knee pain.  - Entered a referral to neurology to establish care for provider for epilepsy and also to evaluate peripheral polyneuropathy.    6.  FOLLOW-UP: Patient will follow-up with me in 2 weeks for postprocedure follow-up visit.  If she has questions or concerns in the meantime, she should not hesitate to call.    Subjective:     Valentina Olmedo is a 64 year old female who presents today for follow-up regarding chronic neck pain and back pain.  I last saw the patient February 29, 2024.  At that time she complained of back pain after a fall and a CT thoracic spine was obtained which was read as having a subacute L1 superior endplate compression fracture.  However, in reviewing her chart, I see that she has had an L1 compression fracture since at least 2019.  She reports that her back pain has improved.  She states it is only mildly sore when she tries to walk.    Patient complains of neck pain.  Pain radiates into bilateral shoulders and all the way down both arms to the hands.  Neck pain also radiates up to the head causing headache.    Patient also brings for a new complaint today of chronic bilateral medial knee pain.  She would like to see an orthopedic specialist.    Patient also complains of neuropathy.  She states that her neuropathy in her legs is getting worse.  It is now extending up to her thighs.    Patient rates her pain today as a 4 out of 10.  At its worst it is an 8 of 10.  At its best it is a 2 out of 10.  Pain is aggravated with walking and stretching.  She is unable to identify any alleviating factors for her pain.    Treatment to date:  - Physical therapy off-and-on for many years.  Most recent outpatient physical therapy was August 2022.  She has had home care PT and OT 2023.    - Chiropractic treatment was helpful in the past,  - C7-T1 interlaminar epidural  steroid injection November 9, 2023 with 70% relief of pain lasting about 3 months  - No spine surgeries  - Tylenol   - Patient takes duloxetine 120 mg daily for mood  - Tizanidine 4 mg 3 times daily  -Gabapentin caused a rash  - Pregabalin  - Prednisone    Review of systems:  Positive for numbness/tingling, weakness, headache, trip/stumble/falls, difficulty swallowing, difficulty with hand skills.  Negative for loss of bowel/bladder control, inability to urinate, pain much worse at night, fevers, unintentional weight loss.  Objective:   CONSTITUTIONAL:  Vital signs as above.  No acute distress.  The patient is well nourished and well groomed.  Patient is observed sitting in a wheelchair.  PSYCHIATRIC:  The patient is awake, alert, oriented to person, place and time.  The patient is answering questions appropriately with clear speech.  Normal affect.  HEENT: Normocephalic, atraumatic.  Sclera clear.    SKIN: Exposed skin is clean, dry, intact without rashes.  MUSCULOSKELETAL: Patient is observed sitting in wheelchair.  Gait is deferred.  The patient has  5/5 strength for the bilateral shoulder abductors, elbow flexors/extensors, wrist extensors, finger flexors/abductors.  5/5 strength bilateral hip flexors, knee flexors/extensors, ankle dorsi/plantar flexors.    NEUROLOGICAL:   Negative Gerardo sign bilaterally.  Sensation light touch intact bilateral upper extremities throughout.  Patient reports subjective sensory deficit left greater than right lower extremity up to the thighs.     RESULTS:   I reviewed the x-ray lumbar spine from [facility] dated March 11, 2024.  This shows stable compression fractures at L1, L2, and L5.  No new areas of vertebral body height loss.    I reviewed the CT thoracic spine from [facility] dated March 8, 2024.  This shows a subacute L1 compression fracture with 25% loss of vertebral body height.  There is no retropulsion.    I reviewed theCT cervical spine from [facility]  dated February 7, 2023.  This shows multilevel cervical spondylosis with moderate to severe spinal canal stenosis C4-5, C5-6, C6-7 and moderate to severe spinal canal stenosis right C3-4.  There is also moderate bilateral foraminal stenosis bilaterally C4-5, moderate to severe bilateral foraminal stenosis C5-6, and moderate bilateral foraminal stenosis C6-7.  There is a possible densely mineralized meningioma in the dorsal spinal canal at C4-5 measuring 4 mm contributing to moderate to severe spinal canal stenosis.  This lesion is new from 2018.    I reviewed the MRI cervical spine no contrast from [facility] dated July 5, 2023.  The possible densely mineralized meningioma at C4-5 is not visualized on the MRI.  There is multilevel cervical spondylosis.  At C2-3 there is moderate right foraminal stenosis.  At C3-4 there is mild spinal canal stenosis with moderate left and severe right foraminal stenosis.  At C4-5 there is moderate spinal canal stenosis with moderate right and severe left foraminal stenosis.  At C5-6 there is moderate to severe spinal canal stenosis with severe left and moderate right foraminal stenosis.  At C6-7 there is moderate spinal canal stenosis with severe left and moderate right foraminal stenosis.    I reviewed the MRI lumbar spine from [facility] dated June 30, 2023.  This shows chronic compression deformities at L1, L2, and L5.  There is epidural lipomatosis and multilevel spondylosis.  At L5-S1 there is mild to moderate thecal sac stenosis, mild left and mild to moderate right foraminal stenosis.  At L4-5 there is moderate to severe thecal sac stenosis and mild bilateral foraminal stenosis.    I reviewed a CT lumbar spine from [facility] dated March 12, 2020.  This shows an L1 superior endplate compression fracture without retropulsion.  There is also an L2 compression fracture with 20% height loss.  There is also an L5 superior endplate compression fracture with 25% height  loss.    I reviewed the report of a CT abdomen pelvis from [facility] dated November 24, 2019.  This shows a chronic L1 vertebral body compression fracture.

## 2024-04-08 ENCOUNTER — OFFICE VISIT (OUTPATIENT)
Dept: PHYSICAL MEDICINE AND REHAB | Facility: CLINIC | Age: 64
End: 2024-04-08
Payer: COMMERCIAL

## 2024-04-08 ENCOUNTER — TELEPHONE (OUTPATIENT)
Dept: ANTICOAGULATION | Facility: CLINIC | Age: 64
End: 2024-04-08

## 2024-04-08 ENCOUNTER — RADIOLOGY INJECTION OFFICE VISIT (OUTPATIENT)
Dept: PHYSICAL MEDICINE AND REHAB | Facility: CLINIC | Age: 64
End: 2024-04-08
Attending: PHYSICIAN ASSISTANT
Payer: COMMERCIAL

## 2024-04-08 VITALS — OXYGEN SATURATION: 93 % | HEART RATE: 74 BPM | SYSTOLIC BLOOD PRESSURE: 126 MMHG | DIASTOLIC BLOOD PRESSURE: 72 MMHG

## 2024-04-08 VITALS
TEMPERATURE: 98.2 F | HEART RATE: 72 BPM | OXYGEN SATURATION: 93 % | SYSTOLIC BLOOD PRESSURE: 146 MMHG | DIASTOLIC BLOOD PRESSURE: 82 MMHG | RESPIRATION RATE: 18 BRPM

## 2024-04-08 DIAGNOSIS — Z79.01 LONG TERM (CURRENT) USE OF ANTICOAGULANTS: ICD-10-CM

## 2024-04-08 DIAGNOSIS — M54.12 CERVICAL RADICULITIS: ICD-10-CM

## 2024-04-08 DIAGNOSIS — G89.29 BILATERAL CHRONIC KNEE PAIN: Primary | ICD-10-CM

## 2024-04-08 DIAGNOSIS — M25.562 BILATERAL CHRONIC KNEE PAIN: Primary | ICD-10-CM

## 2024-04-08 DIAGNOSIS — M25.561 BILATERAL CHRONIC KNEE PAIN: Primary | ICD-10-CM

## 2024-04-08 DIAGNOSIS — G62.9 PERIPHERAL POLYNEUROPATHY: ICD-10-CM

## 2024-04-08 DIAGNOSIS — I26.99 PULMONARY EMBOLISM WITH INFARCTION (H): Primary | ICD-10-CM

## 2024-04-08 DIAGNOSIS — Z86.711 HISTORY OF PULMONARY EMBOLISM: ICD-10-CM

## 2024-04-08 DIAGNOSIS — Z79.01 LONG TERM CURRENT USE OF ANTICOAGULANT THERAPY: Primary | ICD-10-CM

## 2024-04-08 DIAGNOSIS — G40.909 NONINTRACTABLE EPILEPSY WITHOUT STATUS EPILEPTICUS, UNSPECIFIED EPILEPSY TYPE (H): ICD-10-CM

## 2024-04-08 LAB — INR POINT OF CARE: 1 (ref 0.9–1.1)

## 2024-04-08 PROCEDURE — 99214 OFFICE O/P EST MOD 30 MIN: CPT | Performed by: PHYSICIAN ASSISTANT

## 2024-04-08 PROCEDURE — 36416 COLLJ CAPILLARY BLOOD SPEC: CPT | Performed by: STUDENT IN AN ORGANIZED HEALTH CARE EDUCATION/TRAINING PROGRAM

## 2024-04-08 PROCEDURE — 85610 PROTHROMBIN TIME: CPT | Mod: QW | Performed by: STUDENT IN AN ORGANIZED HEALTH CARE EDUCATION/TRAINING PROGRAM

## 2024-04-08 PROCEDURE — 62321 NJX INTERLAMINAR CRV/THRC: CPT | Performed by: STUDENT IN AN ORGANIZED HEALTH CARE EDUCATION/TRAINING PROGRAM

## 2024-04-08 RX ORDER — LIDOCAINE HYDROCHLORIDE 10 MG/ML
INJECTION, SOLUTION EPIDURAL; INFILTRATION; INTRACAUDAL; PERINEURAL
Status: COMPLETED | OUTPATIENT
Start: 2024-04-08 | End: 2024-04-08

## 2024-04-08 RX ORDER — DEXAMETHASONE SODIUM PHOSPHATE 10 MG/ML
INJECTION, SOLUTION INTRAMUSCULAR; INTRAVENOUS
Status: COMPLETED | OUTPATIENT
Start: 2024-04-08 | End: 2024-04-08

## 2024-04-08 RX ADMIN — LIDOCAINE HYDROCHLORIDE 1 ML: 10 INJECTION, SOLUTION EPIDURAL; INFILTRATION; INTRACAUDAL; PERINEURAL at 14:06

## 2024-04-08 RX ADMIN — DEXAMETHASONE SODIUM PHOSPHATE 10 MG: 10 INJECTION, SOLUTION INTRAMUSCULAR; INTRAVENOUS at 14:07

## 2024-04-08 ASSESSMENT — PAIN SCALES - GENERAL
PAINLEVEL: MILD PAIN (2)
PAINLEVEL: MODERATE PAIN (4)

## 2024-04-08 NOTE — PATIENT INSTRUCTIONS
Follow-up visit with MISHEL Teresa in 2 weeks to discuss injection outcome and determine care plan going forward.    Please resume your warfarin (as prescribed) on Tuesday, 4/9/24, now that your procedure has been completed.       DISCHARGE INSTRUCTIONS    During office hours (8:00 a.m.- 4:00 p.m.) questions or concerns may be answered  by calling [facility] Navigation Nurses at  [phone].  Messages received after hours will be returned the following business day.      In the case of an emergency, please dial 911 or seek assistance at the nearest Emergency Room/Urgent Care facility.     All Patients:    You may experience an increase in your symptoms for the first 2 days (It may take anywhere between 2 days- 2 weeks for the steroid to have maximum effect).    You may use ice on the injection site, as frequently as 20 minutes each hour if needed.    You may take your pain medicine.    You may continue taking your regular medication after your injection. If you have had a Medial Branch Block you may resume pain medication once your pain diary is completed.    You may shower. No swimming, tub bath or hot tub for 48 hours.  You may remove your bandaid/bandage as soon as you are home.    You may resume light activities, as tolerated.    Resume your usual diet as tolerated.    It is strongly advised that you do not drive for 1-3 hours post injection.    If you have had oral sedation:  Do not drive for 8 hours post injection.      If you have had IV sedation:  Do not drive for 24 hours post injection.  Do not operate hazardous machinery or make important personal/business decisions for 24 hours.      POSSIBLE STEROID SIDE EFFECTS (If steroid/cortisone was used for your procedure)    -If you experience these symptoms, it should only last for a short period    Swelling of the legs              Skin redness (flushing)     Mouth (oral) irritation   Blood sugar (glucose) levels            Sweats                    Mood  changes  Headache  Sleeplessness  Weakened immune system for up to 14 days, which could increase the risk of darryl the COVID-19 virus and/or experiencing more severe symptoms of the disease, if exposed.  Decreased effectiveness of the flu vaccine if given within 2 weeks of the steroid.         POSSIBLE PROCEDURE SIDE EFFECTS  -Call the Spine Center if you are concerned  Increased Pain           Increased numbness/tingling      Nausea/Vomiting          Bruising/bleeding at site      Redness or swelling                                              Difficulty walking      Weakness           Fever greater than 100.5    *In the event of a severe headache after an epidural steroid injection that is relieved by lying down, please call the [facility] to speak with a clinical staff member*

## 2024-04-08 NOTE — PROGRESS NOTES
Date:04/09/2024      COMPREHENSIVE PAIN CLINIC FOLLOW UP EVALUATION    I had the pleasure of meeting Ms. Valentina Olmedo on 11/7/2023 in the [facility] in consult for Dr. Swanson with regards to her pain.  The patient is a 63 year old female with past medical history of chronic anticoagulation, h/o PE, physical deconditioning, CHRIS, GERD, seizure disorder, h/o CVA, cervical stenosis, chronic intractable pain, morbid obesity, schizoaffective disorder/depression, borderline personality disorder, PTSD who presents for evaluation of chronic pain.  Patient is on warfarin.      Updates since last appointment on 11/08/2023 initial consult.  She presents with significant other.    She is attending PT.  She had L) cataract surgery. R) cataract surgery 04/18/2024.    B/l occipital nerve blocks with Dr. Zeng - not done  [facility]      She has been referred to [facility] for her b/l knee pain.  Referred to Neurology for evaluate peripheral neuropathy and epilepsy. Patient taking lyrica 25mg TID with no negative side effects.  Will increase today.    07/06/2023 Dr. Jacobsen recommends weight loss prior to consideration of elective spinal surgery with goal BMI less than 40.       Interventions/Injections:   04/08/2024 - Repeat C7-T1 VIOLETA at spine center yesterday - discussed need 7-10 days before steroid takes effect for pain relief.  11/09/2023 C7-T1 VIOLETA provided 70% relief for almost 3 months at spine center      Progress Notes Reviewed:  04/08/2024 [facility], [facility] - cervical, thoracic and lumbar pain. Referred to Orthopedics for b/l knee pain.  repeat CT cervical spine in 1 year to monitor the densely calcified lesion.  This is scheduled for July 24, 2024.   04/02/2024 PT  04/01/2024 Nadeen Sandy, DAVID, Sleep Medicine  03/27/2024 Dr. Jose Maria Starr, pre op for cataract surgery  03/19/2024 Conchita Smith, Mental Health appt.    Any hospitalizations/ER/UC visits since last  "appointment:none  Any falls/accidents since last appointment: none      Primary Pain :  Patient endorses chronic pain in cervical spine L>R that started years ago without a precipitating event.  Pain radiates down b/l arms R>L and into her hands.        Characteristics:  No changes in pain characteristics since last appointment.    Her arms feel weak.  Patient has constant numbness and tingling in b/l fingers .  Patient has not had any spine surgery in the past.  The patient describes the pain as constant, sharp, heavy, throbbing, dull, shooting.       What makes the pain better:  She reports that the pain is made better by \"unknown\".   What makes the pain worse:   She reports that the pain is made worse by \"unknown\".   04/09/2024 current pain on 0/10 VAS:       Worst pain:        Best pain:         11/08/2023 current pain score at 4/10, but it can be as low as 4/10 or as severe as 9/10.    Current Pain Related Medications:  Any medications changes since last appointment: yes changes in bold    Pregabalin 25mg TID  Tegretol 200mg am and 400pm and 200mg q hs  Duloxetine 60mg 2 caps daily  Meclizine 25mg 1/2 to 1 tab prn dizziness  Melatonin 3mg q hs  Oxybutynin ER  Prasoin 2mg 1 tab q hs  Seroquel 25mg BID  Tizanidine 4mg TID - discontinued  Chantix 1mg BID  Warfarin 5mg per anticoagulation clinic   Briviact 100mg TID - epilepsy      NOTE:  gabapentin causes rash, Lamictal causes dizziness      Therapies discuss on initial consult:   Physical therapy, Pain Psychology, TENs unit, Grounding Mat, Frequency Specific Micro Current, Anti-inflammatory Lifestyle    Social:  Patient is  and lives in an apartment in [place].  She is independent in ADL's.  She has a cat.      Employment:      Exercise:  Last PT was 3-4 months ago.  She continues to do exercises on a regular basis.  Patient is attending hand therapy [facility] twice weekly.      Hobbies:      Mental Health:    Patient endorses chronic anxiety and " depression.  Patient does follow with a [facility] mental health care provider every few month.                  Plan on initial consult 11/08/2023:  A multimodal plan was developed today to treat your pain.  Multimodal analgesia is a strategy that reduces reliance on opioids through the use of non-opioid analgesics and therapies that have different mechanisms of action.      Diagnostics: cervical MRI was reviewed today.        Medications:  Start pregabalin 25mg TID which was your previous dose.  Every 2 wks add a tablet until you are taking 2 tabs three times a day.        Current Treatments:  Pregabalin 25mg TID  Tegretol 200mg am and pm and 400mg q hs  Duloxetine 60mg 2 caps daily  Meclizine 25mg 1/2 to 1 tab prn dizziness  Melatonin 3mg q hs  Oxybutynin ER  Prasoin 2mg 1 tab q hs  Seroquel 25mg BID  Tizanidine 4mg TID  Chantix 1mg BID  Warfarin 5mg per anticoagulation clinic   Briviact 100mg TID - epilepsy    NOTE:  gabapentin causes rash, Lamictal causes dizziness      The following OTC pain medications may be helpful, use as directed: Voltaren Gel 1%, CBD products, Arnica products, Capsaicin products, Australian Dream Cream, Epson It, Arnica Products, Lidocaine Patch, Solanpas, Biofreeze, Aspercream, Tiger Balm and Jeromy Emu cream.  Apply heat or cold PRN.      Therapies:  Discussed Pain Psychology - consider in the future  Psychological treatments are also important part of pain management.  Understanding and managing the thoughts, emotions and behaviors that accompany the discomfort can help you cope more effectively with your pain and can actually reduce the intensity of your pain.      PHYSICAL THERAPY -  Encourage patient to continue working with physical therapy.  Discussed the importance of core strengthening, ROM, stretching exercises with the patient and how each of these entities is important in decreasing pain.  Explained to the patient that the purpose of physical therapy is to teach the  "patient a home exercise program.  These exercises need to be performed every day in order to decrease pain and prevent future occurrences of pain.        Discussed Grounding Mat - handout provided  [email or web address]/[email or web address]    Discussed Frequency Specific Microcurrent - handout provided  Treatment for Neuropathic Pain.   [facility] [phone]  [facility] [phone]  [facility] [phone]  [facility] [phone]  May be able to be billed as a chiropractic service depending on your insurance coverage.     Discussed Acupuncture.    Fax for [employer] TENs unit.    Interventions:      B/l occipital nerve blocks with Dr. Karri Bermudez appt for [facility]    Follow up:   1-2 months in clinic after you are taking pregabalin 25mg 2 tabs TID.        -------------------------------------------------------------------------------------------------------------------------------------  History of pain on initial consult 11/07/2023  Subjective: She presents with Al in a wheel chair.    Patient endorses chronic pain in cervical spine L>R that started years ago without a precipitating event.  Pain radiates down b/l arms R>L and into her hands.  Her arms feel weak.  Patient has constant numbness and tingling in b/l fingers .  Patient has not had any spine surgery in the past.  The patient describes the pain as constant, sharp, heavy, throbbing, dull, shooting.  She reports that the pain is made worse by \"unknown\".  Her pain is improved with \"unknown\".  She rates her currenty pain score at 4/10, but it can be as low as 4/10 or as severe as 9/10. She is [facility] on [facility].  She uses a wheel chair at times due to injury to low back from a fall. She fx her L) elbow in September.      Patient denies any anxiety or depression.  Patient does follow with a [facility] mental health care provider every few month.  Last PT was " 3-4 months ago.  She continues to do exercises on a regular basis.  Patient is attending hand therapy [facility] twice weekly.    Progress Notes Reviewed:  10/18/2023 Dr. Gracie Jacobsen, Neurological Surgery - cervical stenosis/chronic pain.  She needs to lose weight prior to surgery.  Discussed C7-T1 VIOLETA.  She wants to avoid surgery as long as she can.  10/17/2023 Dr. Mariann Puente -   10/13/2023 [facility]    She denies any new problems with falls or balance, any new numbness or weakness of the arms or legs, any new bowel or bladder incontinence, any night sweats or unexplained fevers, or any sudden or unexpected weight loss.      Valentina Olmedo has not been seen at a pain clinic in the past.        Current Treatments:  She does not know her medication without looking them up on her phone.  Pregabalin 25mg TID per [facility]  Tegretol 200mg am and pm and 400mg q hs per Dr. Benitez for epilepsy  Meclizine 25mg 1/2 to 1 tab BID-TID dizziness - vertigo  Melatonin 3mg q hs  Oxybutynin ER - overactive bladder  Prasoin 2mg 1 tab q hs per Dr. Puente  Seroquel 25mg BID per Dr. Puente  Tizanidine 4mg TID  Chantix 1mg BID  Warfarin 5mg per anticoagulation clinic   Briviact 100mg TID - epilepsy    NOTE:  gabapentin causes rash, Lamictal causes dizziness    Previous Medication Treatments Included:  Anti-convulsants: gabapentin causes rash  Muscle relaxors:   Anti-depressants: Lamictal causes dizziness, Duloxetine 60mg 2 caps daily per Dr. Puente  Benzodiazapine's:   Acetaminophen/NSAIDs: No NSAID due to warfarin, acetaminophen is not effective  Topicals: biofreeze causes itching, burning,  Opioids: not suppose to take opioids with warfarin  Coumadin    Other Treatments Have Included:  Physical therapy: PT 3 months ago, currently in hand therapy at [facility]  Pain Psychology:   Chiropractic:   Acupuncture:   TENs Unit:   Injections:   Surgeries:   Dry Needling:   Massage:      Past Medical History:   Medical history reviewed.  Past Medical History:   Diagnosis Date    Adrenal mass (H24) 10/12/2015    Chen Null MD  They were incidentally discovered on the CAT scan of the abdomen from December 2011 which was done for evaluation of kidney stones. F/up CT of the abdomen done on 6/26/12 showed a stable 5 mm nodular opacity in the right lower lung lobe, adjacent to the diagram. Bilateral adrenal masses average density measured less than 0 Hounsfield units, consistent with benign etio    Anxiety     Anxiety     Arthritis     Borderline personality disorder (H)     Cancer (H)     COPD (chronic obstructive pulmonary disease) (H)     Depression     Depressive disorder     History of COVID-19     Hyperlipidemia     Hypertension     Hypertension     Hyponatremia     Hypothyroidism     Malignant neoplasm of thyroid gland (H)     Chen Null MD  She had R hemithyroidectomy for a right neck tumor in 1994. According to the patient, the tumor was benign. She is not sure whether or not the tumor belonged to the thyroid gland. The surgery was done here at [facility]. In January 2011, she was diagnosed with kidney stones. She was found to have an elevated calcium level of 11.7, with a PTH level of 368. Stone an    Primary hyperparathyroidism (H24)           PTSD (post-traumatic stress disorder)     Pulmonary embolism with infarction (H) 01/12/2021    Recurrent otitis media     Seizure disorder (H)     Stroke (H)     Tobacco abuse     Vertigo       Patient Active Problem List   Diagnosis    Essential hypertension    CHRIS (obstructive sleep apnea)    Seasonal and perennial allergic rhinoconjunctivitis of right eye    Closed head injury    History of thyroid cancer    Adrenal mass, left (H24)    Class 3 severe obesity due to excess calories with serious comorbidity and body mass index (BMI) of 50.0 to 59.9 in adult (H)    Nonintractable epilepsy without status epilepticus (H)    Esophageal reflux     Mixed hyperlipidemia    HLD (hyperlipidemia)    Hypothyroidism    Chronic obstructive pulmonary disease (H)    Schizoaffective disorder, depressive type (H)    DNR (do not resuscitate)    Migraine headache    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)    Fracture of vertebra due to osteoporosis with routine healing, subsequent encounter    Major depressive disorder, recurrent episode, moderate (H)    Venous stasis    Peripheral polyneuropathy    Pulmonary embolism with infarction (H)    Idiopathic osteoporosis    Physical deconditioning    PTSD (post-traumatic stress disorder)    Long term (current) use of anticoagulants    History of melanoma    Seasonal affective disorder (H24)    Vision changes    Bilateral hand pain    Problem related to housing and economic circumstances    History of pulmonary embolism    Medial knee pain, right    Bilateral sensorineural hearing loss    Intertrigo    Dysfunction of both eustachian tubes    Hx pulmonary embolism    Age-related cataract of both eyes, unspecified age-related cataract type    Chronic intractable pain    Strain of lumbar paraspinal muscle, initial encounter    Cervical stenosis of spinal canal    Cervical radiculopathy         Past Surgical History:  Pertinent surgical history reviewed.  Past Surgical History:   Procedure Laterality Date    ABDOMEN SURGERY      ADRENALECTOMY Left     BRAIN SURGERY      CRANIOTOMY FOR TEMPORAL LOBECTOMY Right     x3 for seizure    DILATION AND CURETTAGE      HEAD & NECK SURGERY      HYSTERECTOMY, PAP NO LONGER INDICATED N/A     LITHOTRIPSY      PARATHYROID GLAND SURGERY      resection of adenoma    REFRACTIVE SURGERY Bilateral     RELEASE CARPAL TUNNEL Bilateral     TONSILLECTOMY & ADENOIDECTOMY      TOTAL THYROIDECTOMY      TOTAL VAGINAL HYSTERECTOMY      39 years old. Benign          Medications: Pertinent medications reviewed.  Current Outpatient Medications   Medication Sig Dispense Refill    albuterol (PROAIR HFA) 108 (90 Base) MCG/ACT  inhaler Inhale 2 puffs into the lungs every 4 hours as needed for shortness of breath or wheezing 8 g 4    ARIPiprazole (ABILIFY) 10 MG tablet Take 1 tablet (10 mg) by mouth every morning 30 tablet 4    azelastine (ASTELIN) 0.1 % nasal spray Spray 1 spray into both nostrils 2 times daily 30 mL 1    Brivaracetam (BRIVIACT) 100 MG tablet Take 150 mg by mouth 2 times daily       calcitonin, salmon, (MIACALCIN) 200 UNIT/ACT nasal spray INSERT 1 SPRAY INTO 1 NOSTRIL, ALTERNATING NOSTRILS EACH DAY 3.7 mL 0    Calcium Citrate-Vitamin D (CALCIUM + D PO) Take 1 tablet by mouth daily       carBAMazepine (TEGRETOL) 200 MG tablet 200mg in the  at lunch and 400mg at HS      Cyanocobalamin (VITAMIN B12) 1000 MCG TBCR Take 1 tablet by mouth      denosumab (PROLIA) 60 MG/ML SOSY injection Inject 60 mg Subcutaneous once Per pt report      DULoxetine (CYMBALTA) 60 MG capsule Take 2 capsules (120 mg) by mouth daily 180 capsule 3    enoxaparin ANTICOAGULANT (LOVENOX) 40 MG/0.4ML syringe Inject 0.4 mLs (40 mg) Subcutaneous every 12 hours Prior to rescheduled procedure. 3.2 mL 0    EPINEPHrine (ANY BX GENERIC EQUIV) 0.3 MG/0.3ML injection 2-pack Inject 0.3 mLs (0.3 mg) into the muscle as needed for anaphylaxis 2 each 1    esomeprazole (NEXIUM) 40 MG DR capsule TAKE 1 CAPSULE BY MOUTH EVERY MORNING BEFORE BREAKFAST (TAKE 30-60 MINUTES BEFORE EATING) 28 capsule 2    fexofenadine (ALLEGRA) 180 MG tablet TAKE 1 TABLET BY MOUTH DAILY 90 tablet 3    fluticasone (FLONASE) 50 MCG/ACT nasal spray INHALE 1 SPRAY IN EACH NOSTRIL DAILY Strength: 50 MCG/ACT 16 g 11    hydrochlorothiazide (HYDRODIURIL) 25 MG tablet 2 times daily      levothyroxine (SYNTHROID/LEVOTHROID) 300 MCG tablet Take 1 tablet (300 mcg) by mouth daily 90 tablet 3    losartan (COZAAR) 25 MG tablet Take 0.5 tablets (12.5 mg) by mouth daily 90 tablet 1    meclizine (ANTIVERT) 25 MG tablet       melatonin 3 MG tablet Take 1 tablet (3 mg) by mouth nightly as needed for sleep 90  tablet 4    multivitamin with iron (CHROMAGEN) TABS half-tab Take 1 tablet by mouth daily      Nutritional Supplements (NUTRITIONAL SHAKE HIGH PROTEIN) LIQD Take 1 each by mouth every morning Dispense Premier protein shakes. Replace breakfast with 1 shake daily. (Patient not taking: Reported on 4/1/2024) 9900 mL 11    nystatin (MYCOSTATIN) 159799 UNIT/GM external cream Apply topically 2 times daily as needed (skin infection/inflammation) (Apply to the most sensitive inflamed areas of skin) 90 g 2    nystatin (MYCOSTATIN) 194740 UNIT/GM external powder Apply topically 3 times daily as needed for other (fungal skin infection - apply to skin in groin) (Patient not taking: Reported on 4/1/2024) 120 g 1    Polyethylene Glycol POWD See Admin Instructions Use daily as needed      polymixin b-trimethoprim (POLYTRIM) 38261-0.1 UNIT/ML-% ophthalmic solution Place 1-2 drops into the right eye every 4 hours 10 mL 0    prazosin (MINIPRESS) 2 MG capsule Take 1 capsule (2 mg) by mouth at bedtime 90 capsule 1    pregabalin (LYRICA) 50 MG capsule Take 1 capsule (50 mg) by mouth 3 times daily 90 capsule 2    QUEtiapine (SEROQUEL) 25 MG tablet Take 1-2 tablets (25-50 mg) by mouth daily For agitation. 180 tablet 4    rosuvastatin (CRESTOR) 20 MG tablet TAKE 1 TABLET BY MOUTH DAILY 28 tablet 12    salmeterol (SEREVENT DISKUS) 50 MCG/ACT inhaler INHALE 1 PUFF INTO THE LUNGS TWICE A DAY 60 each 5    senna (SENNA-TIME) 8.6 MG tablet TAKE 2 TABLETS (17.2MG) BY MOUTH TWICE A  tablet 11    umeclidinium (INCRUSE ELLIPTA) 62.5 MCG/ACT inhaler Inhale 1 puff into the lungs daily 30 each 2    varenicline (CHANTIX) 1 MG tablet TAKE 1 TABLET BY MOUTH TWICE A DAY 56 tablet 2    Vibegron (GEMTESA) 75 MG TABS tablet Take 75 mg by mouth daily      vitamin D3 (CHOLECALCIFEROL) 50 mcg (2000 units) tablet TAKE 2 TABLETS BY MOUTH DAILY 180 tablet 3    warfarin ANTICOAGULANT (COUMADIN) 5 MG tablet TAKE 2 TABLETS BY MOUTH ON MON AND FRI ; TAKE 2 & 1/2  TABLETS BY MOUTH ALL OTHER DAYS AS DIRECTED BY INR CLINIC 180 tablet 3    zolpidem (AMBIEN) 5 MG tablet Take tablet by mouth 15 minutes prior to sleep, for Sleep Study 1 tablet 0       [place] Prescription Monitoring Program reviewed 04/09/2023.  No concern for abuse or misuse of controlled medications based on this report.  04/01/2024 ambin 10mg 1 tab for 1 day  03/20/2024 Pregabalin 50mg 84 tabs for 28 days  03/20/2024 Briviact 100 84 tabs for 28 days  02/21/2024 Pregabalin 25mg 168 tabs for 28 days  02/21/2024 Briviact 100 84 tabs for 28 days  01/26/2024 Pregabalin 25mg 168 tabs for 28 days  01/24/2024 Briviact 100 84 tabs for 28 days  01/05/2024 Pregabalin 25mg 168 tabs for 28 days  10/18/2023 Briviact 100mg 84 tabs for 28 days  10/17/2023 Pregabalin 25mg 84 tabs for 28 days  10/04/2023 Pregabalin 25mg 84 tabs for 28 days  09/27/2023 Briviact 100mg 84 tabs for 28 day    Allergies: Pertinent allergies reviewed.     Allergies   Allergen Reactions    Aspirin Shortness Of Breath    Bees Anaphylaxis    Lamictal [Lamotrigine] Dizziness    Latex Itching    Phenobarbital      aggression    Vistaril [Hydroxyzine] Other (See Comments)     Sluggish, unable to wake up    Gabapentin Rash       Family History:   family history includes Alcoholism in her brother and sister; Chronic Obstructive Pulmonary Disease in her mother; Depression in her sister; Diabetes in her sister; Lung Cancer (age of onset: 76) in her maternal grandfather; No Known Problems in her sister; Other - See Comments in her brother and father; Skin Cancer in her father.    Social History:   Patient is  and lives in an apartment in [place].  She is independent in ADL's.  She has a cat.  She  reports that she has quit smoking. She has never been exposed to tobacco smoke. She has never used smokeless tobacco. She reports that she does not drink alcohol and does not use drugs.  Social History     Social History Narrative    Not on file           Physical  Exam:  BP (!) 148/78   Pulse 75   LMP  (LMP Unknown)   SpO2 92%       Constitutional: She is oriented to person, place, and time.  She appears well-developed and well-nourished. She is not in acute distress. Morbidly obese.  HENT:     Head: Normocephalic and atraumatic.     Eyes: Pupils are equal, round, and reactive to light. EOM are normal. No scleral icterus.   Pulmonary/Chest:  NWOB. No respiratory distress.   Neurological: She is alert and oriented to person, place, and time. Coordination grossly normal.  Romberg test negative. House's test is negative  Skin: Skin is warm and dry. She is not diaphoretic.   Psychiatric: She has a normal mood and affect. Her behavior is normal. Judgment and thought content normal.  Patient answers questions appropriately.  MSK: Gait is normal.  Patient cannot walk on toes, heals, heal toe walk and perform heal to shin testing without difficulty.    Cervical spine:  ROM: mildly restricted in all planes  Rotation/ext to right: painful   Rotation/ext to left: painful   Myofascial tenderness: occipital nerves, left paracervical muscles, right paracervical muscles  Normal 5/5 UE strength bilaterally   Normal sensation to light touch in the C4-T1 distributions bilaterally         Imaging:        DEXA was reviewed today.  IMPRESSION: Low bone density (OSTEOPENIA) with interval increase in BMD from the prior scan. T score meets the WHO criteria for low bone density (osteopenia) at one or more measured sites. The risk of osteoporotic fracture increases approximately two-fold for each standard deviation decrease in T-score.      EMG:  na      Diagnosis:  (M48.02) Cervical stenosis of spinal canal  (primary encounter diagnosis)  Comment:   Plan:     (M54.81) Bilateral occipital neuralgia  Comment:   Plan:     (M54.12) Cervical radiculopathy  Comment:   Plan:       (G89.29) Chronic intractable pain  Comment:   Plan:           Plan on 04/09/2024:    Increase lyrica by 1 25mg tab every 3  days until taking lyrica 25mg 2 tabs TID to help manage neuropathic pain.      Follow-up in clinic in 1 month to re-evaluate neuropathic pain.    SHAHRZAD Hernandez, RN, CNP, FNP  [facility]        BILLING TIME DOCUMENTATION:   The total TIME spent on this patient on the date of the encounter/appointment was 30 minutes.

## 2024-04-08 NOTE — TELEPHONE ENCOUNTER
[facility]-PROCEDURAL ANTICOAGULATION  MANAGEMENT    [facility] requesting pre-procedure hold orders for warfarin and review for bridging      Procedure date: 05/02/2024       Procedure:  EGD/Colonoscopy      Procedure location and phone number (if external): [facility]  ([phone])     Number of warfarin hold days requested and/or target INR: 4 days    Pre-op date:  03/27/2024 for different procedure.      Routing to Anticoagulation Pharmacist for review.      Kindra Garay RN

## 2024-04-08 NOTE — LETTER
4/8/2024         RE: Valentina Olmedo  [address]  [facility] [address]        Dear Colleague,    Thank you for referring your patient, Valentina Olmedo, to the [facility]. Please see a copy of my visit note below.    Assessment:   Valentina Olmedo is a 64 y.o. female with past medical history significant for epilepsy, migraine, polyneuropathy, obstructive sleep apnea, COPD, history of PE, obesity, hyperlipidemia, hypothyroidism, hypertension, osteoporosis (on Prolia) , on warfarin, chronic kidney disease stage III, tobacco abuse, schizoaffective disorder, depression, PTSD, history of thyroid cancer, history of melanoma who presents today for follow-up regarding multiple concerns:  1.  Chronic neck pain with radiation into the right upper extremity with associated numbness, tingling, weakness.  Patient had a CT cervical spine at [facility] February 7, 2023 which showed moderate to severe spinal canal stenosis C4-5, C5-6, and C6-7 and moderate to severe spinal canal stenosis, right at C3-4.  There is also multilevel high-grade foraminal stenosis.  At C4-5 there is a 4 mm calcified lesion in the dorsal spinal canal which is new from 2018, possibly representing a densely mineralized meningioma.  The calcified lesion is not visualized on an MRI cervical spine.  The MRI of the cervical spine showed moderate to severe spinal canal stenosis C5-6 and C6-7 and multilevel high-grade foraminal stenosis.  Patient is following up after a C7-T1 interlaminar epidural steroid injection November 9, 2023 which provided 70% relief of pain lasting close to 3 months.  She is scheduled for a repeat C7-T1 interlaminar epidural steroid injection later this afternoon.  -Patient saw Dr. Jacobsen July 6, 2023 regarding her cervical spine findings.  She recommended weight loss prior to consideration of elective spinal surgery with a goal BMI of less than 40.  2.  Acute on chronic low back pain  and thoracic spine pain.  Pain flared up 9-10 weeks ago.  She believes she had a fall at home around that time.  A CT thoracic spine March 8, 2024 was read as showing a subacute L1 compression fracture without retropulsion.  However, I was able to review prior imaging studies dating back to 2019 which showed an L1 compression fracture with similar vertebral body morphology.  She does not have any new or acute fractures.  3.  Chronic bilateral knee pain.  We will refer to orthopedics.  4.  Peripheral polyneuropathy.  Patient reports that she needs to establish care with a neurologist for her epilepsy and wants them to also evaluate her neuropathy.  Referral was entered.       Plan:     A shared decision making plan was used.  The patient's values and choices were respected.  The following represents what was discussed and decided upon by the physician assistant and the patient.      1.  DIAGNOSTIC TESTS:  - I reviewed the CT thoracic spine from March 2024.  - I reviewed the lumbar spine x-rays from March 2024.  -I reviewed the  CT cervical spine from [facility] from February 7, 2023.  - I reviewed the MRI cervical spine with and without contrast from [place] [facility] from July 2023.  - We discussed this patient's case at spine rounds in July 2023.  Recommendation is for a repeat CT cervical spine in 1 year to monitor the densely calcified lesion.  This is scheduled for July 24, 2024.  - Also reviewed an MRI lumbar spine from 2020 which showed the L1 compression deformity.  Also reviewed a report of a CT abdomen pelvis from 2019 which described an L1 compression deformity.      2.  PHYSICAL THERAPY: Patient has had off-and-on physical therapy for many years.  Most recent course of physical therapy outpatient was August 2022.  She also had home care PT and OT 2023.  She should continue home exercises.    3.  MEDICATIONS: No changes are made to the patient's medications.  Patient sees the pain clinic.  - Patient  can continue Tylenol as needed.  - Patient takes pregabalin 50 mg 3 times daily.  - Patient takes duloxetine 120 mg daily for mood.  - Patient cannot take NSAIDs since she is on warfarin.    4.  INTERVENTIONS: Patient will proceed with the repeat C7-T1 interlaminar epidural steroid injection later this afternoon.    5.  REFERRALS:  - Entered a referral to orthopedics for her bilateral knee pain.  - Entered a referral to neurology to establish care for provider for epilepsy and also to evaluate peripheral polyneuropathy.    6.  FOLLOW-UP: Patient will follow-up with me in 2 weeks for postprocedure follow-up visit.  If she has questions or concerns in the meantime, she should not hesitate to call.    Subjective:     Valentina Olmedo is a 64 year old female who presents today for follow-up regarding chronic neck pain and back pain.  I last saw the patient February 29, 2024.  At that time she complained of back pain after a fall and a CT thoracic spine was obtained which was read as having a subacute L1 superior endplate compression fracture.  However, in reviewing her chart, I see that she has had an L1 compression fracture since at least 2019.  She reports that her back pain has improved.  She states it is only mildly sore when she tries to walk.    Patient complains of neck pain.  Pain radiates into bilateral shoulders and all the way down both arms to the hands.  Neck pain also radiates up to the head causing headache.    Patient also brings for a new complaint today of chronic bilateral medial knee pain.  She would like to see an orthopedic specialist.    Patient also complains of neuropathy.  She states that her neuropathy in her legs is getting worse.  It is now extending up to her thighs.    Patient rates her pain today as a 4 out of 10.  At its worst it is an 8 of 10.  At its best it is a 2 out of 10.  Pain is aggravated with walking and stretching.  She is unable to identify any alleviating factors for her  pain.    Treatment to date:  - Physical therapy off-and-on for many years.  Most recent outpatient physical therapy was August 2022.  She has had home care PT and OT 2023.    - Chiropractic treatment was helpful in the past,  - C7-T1 interlaminar epidural steroid injection November 9, 2023 with 70% relief of pain lasting about 3 months  - No spine surgeries  - Tylenol   - Patient takes duloxetine 120 mg daily for mood  - Tizanidine 4 mg 3 times daily  -Gabapentin caused a rash  - Pregabalin  - Prednisone    Review of systems:  Positive for numbness/tingling, weakness, headache, trip/stumble/falls, difficulty swallowing, difficulty with hand skills.  Negative for loss of bowel/bladder control, inability to urinate, pain much worse at night, fevers, unintentional weight loss.  Objective:   CONSTITUTIONAL:  Vital signs as above.  No acute distress.  The patient is well nourished and well groomed.  Patient is observed sitting in a wheelchair.  PSYCHIATRIC:  The patient is awake, alert, oriented to person, place and time.  The patient is answering questions appropriately with clear speech.  Normal affect.  HEENT: Normocephalic, atraumatic.  Sclera clear.    SKIN: Exposed skin is clean, dry, intact without rashes.  MUSCULOSKELETAL: Patient is observed sitting in wheelchair.  Gait is deferred.  The patient has  5/5 strength for the bilateral shoulder abductors, elbow flexors/extensors, wrist extensors, finger flexors/abductors.  5/5 strength bilateral hip flexors, knee flexors/extensors, ankle dorsi/plantar flexors.    NEUROLOGICAL:   Negative Gerardo sign bilaterally.  Sensation light touch intact bilateral upper extremities throughout.  Patient reports subjective sensory deficit left greater than right lower extremity up to the thighs.     RESULTS:   I reviewed the x-ray lumbar spine from [facility] dated March 11, 2024.  This shows stable compression fractures at L1, L2, and L5.  No new areas of vertebral body height  loss.    I reviewed the CT thoracic spine from [facility] dated March 8, 2024.  This shows a subacute L1 compression fracture with 25% loss of vertebral body height.  There is no retropulsion.    I reviewed theCT cervical spine from [facility] dated February 7, 2023.  This shows multilevel cervical spondylosis with moderate to severe spinal canal stenosis C4-5, C5-6, C6-7 and moderate to severe spinal canal stenosis right C3-4.  There is also moderate bilateral foraminal stenosis bilaterally C4-5, moderate to severe bilateral foraminal stenosis C5-6, and moderate bilateral foraminal stenosis C6-7.  There is a possible densely mineralized meningioma in the dorsal spinal canal at C4-5 measuring 4 mm contributing to moderate to severe spinal canal stenosis.  This lesion is new from 2018.    I reviewed the MRI cervical spine no contrast from [facility] dated July 5, 2023.  The possible densely mineralized meningioma at C4-5 is not visualized on the MRI.  There is multilevel cervical spondylosis.  At C2-3 there is moderate right foraminal stenosis.  At C3-4 there is mild spinal canal stenosis with moderate left and severe right foraminal stenosis.  At C4-5 there is moderate spinal canal stenosis with moderate right and severe left foraminal stenosis.  At C5-6 there is moderate to severe spinal canal stenosis with severe left and moderate right foraminal stenosis.  At C6-7 there is moderate spinal canal stenosis with severe left and moderate right foraminal stenosis.    I reviewed the MRI lumbar spine from [facility] dated June 30, 2023.  This shows chronic compression deformities at L1, L2, and L5.  There is epidural lipomatosis and multilevel spondylosis.  At L5-S1 there is mild to moderate thecal sac stenosis, mild left and mild to moderate right foraminal stenosis.  At L4-5 there is moderate to severe thecal sac stenosis and mild bilateral foraminal stenosis.    I reviewed a CT lumbar spine from  [facility] dated March 12, 2020.  This shows an L1 superior endplate compression fracture without retropulsion.  There is also an L2 compression fracture with 20% height loss.  There is also an L5 superior endplate compression fracture with 25% height loss.    I reviewed the report of a CT abdomen pelvis from [facility] dated November 24, 2019.  This shows a chronic L1 vertebral body compression fracture.      Again, thank you for allowing me to participate in the care of your patient.        Sincerely,        Lou Swanson PA-C

## 2024-04-09 ENCOUNTER — OFFICE VISIT (OUTPATIENT)
Dept: PALLIATIVE MEDICINE | Facility: OTHER | Age: 64
End: 2024-04-09
Attending: NURSE PRACTITIONER
Payer: COMMERCIAL

## 2024-04-09 ENCOUNTER — MEDICAL CORRESPONDENCE (OUTPATIENT)
Dept: HEALTH INFORMATION MANAGEMENT | Facility: CLINIC | Age: 64
End: 2024-04-09

## 2024-04-09 VITALS — HEART RATE: 75 BPM | SYSTOLIC BLOOD PRESSURE: 148 MMHG | OXYGEN SATURATION: 92 % | DIASTOLIC BLOOD PRESSURE: 78 MMHG

## 2024-04-09 DIAGNOSIS — G89.29 CHRONIC INTRACTABLE PAIN: ICD-10-CM

## 2024-04-09 DIAGNOSIS — Z53.9 DIAGNOSIS NOT YET DEFINED: Primary | ICD-10-CM

## 2024-04-09 DIAGNOSIS — M48.02 CERVICAL STENOSIS OF SPINAL CANAL: ICD-10-CM

## 2024-04-09 DIAGNOSIS — M54.81 BILATERAL OCCIPITAL NEURALGIA: ICD-10-CM

## 2024-04-09 DIAGNOSIS — M54.12 CERVICAL RADICULOPATHY: Primary | ICD-10-CM

## 2024-04-09 PROCEDURE — G0179 MD RECERTIFICATION HHA PT: HCPCS | Performed by: FAMILY MEDICINE

## 2024-04-09 PROCEDURE — 99214 OFFICE O/P EST MOD 30 MIN: CPT | Performed by: NURSE PRACTITIONER

## 2024-04-09 PROCEDURE — G0463 HOSPITAL OUTPT CLINIC VISIT: HCPCS | Performed by: NURSE PRACTITIONER

## 2024-04-09 ASSESSMENT — PAIN SCALES - GENERAL: PAINLEVEL: MILD PAIN (2)

## 2024-04-09 NOTE — TELEPHONE ENCOUNTER
Call in from [facility] for hold orders received via voicemail on 4/9/2024.    Kindra Garay RN    [facility] '

## 2024-04-09 NOTE — PATIENT INSTRUCTIONS
Plan on 04/09/2024:    Increase lyrica by 1 25mg tab every 3 days until taking lyrica 25mg 2 tabs TID to help manage neuropathic pain.      Follow-up in clinic in 1 month to re-evaluate neuropathic pain.    SHAHRZAD Hernandez, RN, CNP, FNP  [facility] Locations        ----------------------------------------------------------------  Clinic Number:  [phone]   Call with any questions about your care and for scheduling assistance.   Calls are returned Monday through Friday between 8 AM and 4:30 PM. We usually get back to you within 2 business days depending on the issue/request.    If we are prescribing your medications:  For opioid medication refills, call the clinic or send a [employer] message 7 days in advance.  Please include:  Name of requested medication  Name of the pharmacy.  For non-opioid medications, call your pharmacy directly to request a refill. Please allow 3-4 days to be processed.   Per [place] State Law:  All controlled substance prescriptions must be filled within 30 days of being written.    For those controlled substances allowing refills, pickup must occur within 30 days of last fill.      We believe regular attendance is key to your success in our program!    Any time you are unable to keep your appointment we ask that you call us at least 24 hours in advance to cancel.This will allow us to offer the appointment time to another patient.   Multiple missed appointments may lead to dismissal from the clinic.

## 2024-04-11 NOTE — TELEPHONE ENCOUNTER
[facility]  called q    [facility] called with orders    Paula Beasley, PharmD BCACP  Anticoagulation Clinical Pharmacist

## 2024-04-11 NOTE — TELEPHONE ENCOUNTER
"SEEMA-PROCEDURAL ANTICOAGULATION  MANAGEMENT    ASSESSMENT     Warfarin interruption plan for colonoscopy/EGD on 2024.    Indication for Anticoagulation: PE    PE 2017  Per 2014 bariatric notes, vague history of blood clotting during pregnancy leading to fetal demise, with recommendation to take ASA with subsequent pregnancies   No hematology testing on file   Has tolerated hold, no bridge, CANDY 2024,     Seema-Procedure Risk stratification for thromboembolism: moderate ( Chest guidelines)    VTE:  CHEST Perioperative Management guidelines suggest against bridging for patients with Hx of VTE as sole clinical indication for warfarin except in high risk stratification patients.    RECOMMENDATION     Pre-Procedure:  Hold warfarin for 4 days, until after procedure startin2024   No Bridge    Post-Procedure:  Resume warfarin dose if okay with provider doing procedure on night of procedure, 2024 PM: 20mg, 2024 15mg  Recheck INR ~ 7 days after resuming warfarin     Plan routed to referring provider for approval  ?   [facility]    SUBJECTIVE/OBJECTIVE     Valentina Olmedo, a 64 year old female    Goal INR Range: 2.0-3.0     Patient bridged in past: Yes: 2024 prophylactic due to prolonged time off anticoagulation         Wt Readings from Last 3 Encounters:   24 (!) 163.1 kg (359 lb 9.6 oz)   24 (!) 161.6 kg (356 lb 3.2 oz)   24 (!) 164.2 kg (361 lb 14.4 oz)      Ideal body weight: 59.3 kg (130 lb 11.7 oz)  Adjusted ideal body weight: 100.8 kg (222 lb 4.5 oz)     Estimated body mass index is 58.04 kg/m  as calculated from the following:    Height as of 24: 1.676 m (5' 6\").    Weight as of 24: 163.1 kg (359 lb 9.6 oz).    Lab Results   Component Value Date    INR 1.0 2024    INR 2.5 (A) 2024    INR 1.1 2024     Lab Results   Component Value Date    HGB 13.6 2023    HCT 41.6 2022     2022     Lab " Results   Component Value Date    CR 0.66 01/30/2024    CR 0.65 11/14/2023    CR 0.75 02/17/2023     Estimated Creatinine Clearance: 138.8 mL/min (based on SCr of 0.66 mg/dL).

## 2024-04-12 DIAGNOSIS — F17.210 CIGARETTE NICOTINE DEPENDENCE: ICD-10-CM

## 2024-04-12 DIAGNOSIS — Z86.711 HISTORY OF PULMONARY EMBOLISM: ICD-10-CM

## 2024-04-12 DIAGNOSIS — Z79.01 LONG TERM (CURRENT) USE OF ANTICOAGULANTS: ICD-10-CM

## 2024-04-12 RX ORDER — WARFARIN SODIUM 5 MG/1
TABLET ORAL
Qty: 56 TABLET | Refills: 11 | Status: SHIPPED | OUTPATIENT
Start: 2024-04-12

## 2024-04-12 NOTE — TELEPHONE ENCOUNTER
Pre-Procedure:  Hold warfarin for 4 days, until after procedure startin2024   No Bridge     Post-Procedure:  Resume warfarin dose if okay with provider doing procedure on night of procedure, 2024 PM: 20mg, 2024 15mg  Recheck INR ~ 7 days after resuming warfarin

## 2024-04-15 ENCOUNTER — MYC REFILL (OUTPATIENT)
Dept: PSYCHIATRY | Facility: CLINIC | Age: 64
End: 2024-04-15
Payer: COMMERCIAL

## 2024-04-15 ENCOUNTER — TELEPHONE (OUTPATIENT)
Dept: FAMILY MEDICINE | Facility: CLINIC | Age: 64
End: 2024-04-15
Payer: COMMERCIAL

## 2024-04-15 NOTE — TELEPHONE ENCOUNTER
Patient Quality Outreach    Patient is due for the following:   Hypertension -  BP check    Next Steps:   Patient has upcoming appointment, these items will be addressed at that time.    Type of outreach:    Chart review performed, no outreach needed.      Questions for provider review:    None           Lacey Ernst MA

## 2024-04-15 NOTE — TELEPHONE ENCOUNTER
Procedure plan reviewed with patient.  Procedure plan sent via My Chart. INR due  4/16/24. [facility] Calendar updated with HOLD dates.

## 2024-04-15 NOTE — TELEPHONE ENCOUNTER
[facility] received faxed refill request for ARIPiprazole (ABILIFY) 10 MG tablet     Too soon for refill until 4/20/2024       Promise Ramos CMA April 15, 2024

## 2024-04-16 ENCOUNTER — ANTICOAGULATION THERAPY VISIT (OUTPATIENT)
Dept: ANTICOAGULATION | Facility: CLINIC | Age: 64
End: 2024-04-16
Payer: COMMERCIAL

## 2024-04-16 ENCOUNTER — TELEPHONE (OUTPATIENT)
Dept: PALLIATIVE MEDICINE | Facility: OTHER | Age: 64
End: 2024-04-16
Payer: COMMERCIAL

## 2024-04-16 DIAGNOSIS — Z79.01 LONG TERM (CURRENT) USE OF ANTICOAGULANTS: ICD-10-CM

## 2024-04-16 DIAGNOSIS — Z86.711 HISTORY OF PULMONARY EMBOLISM: ICD-10-CM

## 2024-04-16 DIAGNOSIS — I26.99 PULMONARY EMBOLISM WITH INFARCTION (H): Primary | ICD-10-CM

## 2024-04-16 LAB — INR (EXTERNAL): 1.5 (ref 0.9–1.1)

## 2024-04-16 NOTE — TELEPHONE ENCOUNTER
Laure Olivares APRN CNP  You20 minutes ago (11:33 AM)     LL  Patient was to increase lyrica by 25mg every 3-5 days to a goal of 2 tabs TID (50mg TID). CANDY CNP     Patient is currently taking this dose

## 2024-04-16 NOTE — TELEPHONE ENCOUNTER
Call back to patient, reviewed instructions and that patient is already taking 50 mg caps TID.  Advised for patient to stay at this daily dosing and that and RN would call back if there were any further recommendations.

## 2024-04-16 NOTE — PROGRESS NOTES
ANTICOAGULATION MANAGEMENT     Valentina Olmedo 64 year old female is on warfarin with subtherapeutic INR result. (Goal INR 2.0-3.0)    Recent labs: (last 7 days)     04/16/24  1004   INR 1.5*       ASSESSMENT     Source(s): Chart Review and Home Care/Facility Nurse     Warfarin doses taken: Held for Procedure  recently which may be affecting INR  Diet: No new diet changes identified  Medication/supplement changes: None noted  New illness, injury, or hospitalization: No  Signs or symptoms of bleeding or clotting: No  Previous result: Subtherapeutic  Additional findings: Upcoming surgery/procedure 5/2/24 [facility] Consult. Hold plan in place        PLAN     Recommended plan for no diet, medication or health factor changes affecting INR     Dosing Instructions:  Boost dose x 2  then continue your current warfarin dose with next INR in 1 week       Summary  As of 4/16/2024      Full warfarin instructions:  4/16: 20 mg; 4/17: 15 mg; 4/28: Hold; 4/29: Hold; 4/30: Hold; 5/1: Hold; 5/2: 20 mg; 5/3: 15 mg; Otherwise 10 mg every Sun, Tue, Thu; 7.5 mg all other days   Next INR check:  4/23/2024               Telephone call with [facility] home care nurse who agrees to plan and repeated back plan correctly    Orders given to  Homecare nurse/facility to recheck    Education provided:   Please call back if any changes to your diet, medications or how you've been taking warfarin    Plan made with [facility] Pharmacist Paula Montenegro, RN  Anticoagulation Clinic  4/16/2024    _______________________________________________________________________     Anticoagulation Episode Summary       Current INR goal:  2.0-3.0   TTR:  55.5% (1 y)   Target end date:  Indefinite   Send INR reminders to:  [facility]    Indications    History of pulmonary embolism (Resolved) [Z86.711]  Pulmonary embolism with infarction (H) [I26.99]  Long term (current) use of anticoagulants [Z79.01]  History of pulmonary embolism [Z86.711]              Comments:               Anticoagulation Care Providers       Provider Role Specialty Phone number    Promise Vanegas MD Referring Family Medicine [phone]    Donald Rooney MD Referring Internal Medicine [phone]    Jose Maria Morin MD Referring Family Medicine [phone]

## 2024-04-16 NOTE — TELEPHONE ENCOUNTER
[facility] Call Center    Phone Message    May a detailed message be left on voicemail: yes     Reason for Call: Other: Patient is calling in regards to changes that were made to her medication. She can't remember which medication was supposed to be increased. Please call back right away since her home care nurse who sets up the medication is there.      Action Taken: Other: Pain    Travel Screening: Not Applicable

## 2024-04-17 ENCOUNTER — LAB (OUTPATIENT)
Dept: LAB | Facility: CLINIC | Age: 64
End: 2024-04-17
Payer: COMMERCIAL

## 2024-04-17 ENCOUNTER — HOSPITAL ENCOUNTER (OUTPATIENT)
Dept: GENERAL RADIOLOGY | Facility: HOSPITAL | Age: 64
Discharge: HOME | End: 2024-04-17
Attending: INTERNAL MEDICINE
Payer: COMMERCIAL

## 2024-04-17 ENCOUNTER — OFFICE VISIT (OUTPATIENT)
Dept: RHEUMATOLOGY | Facility: CLINIC | Age: 64
End: 2024-04-17
Payer: COMMERCIAL

## 2024-04-17 ENCOUNTER — MEDICAL CORRESPONDENCE (OUTPATIENT)
Dept: HEALTH INFORMATION MANAGEMENT | Facility: CLINIC | Age: 64
End: 2024-04-17

## 2024-04-17 VITALS — HEART RATE: 73 BPM | SYSTOLIC BLOOD PRESSURE: 110 MMHG | DIASTOLIC BLOOD PRESSURE: 78 MMHG | OXYGEN SATURATION: 96 %

## 2024-04-17 DIAGNOSIS — M79.642 BILATERAL HAND PAIN: ICD-10-CM

## 2024-04-17 DIAGNOSIS — M79.641 BILATERAL HAND PAIN: ICD-10-CM

## 2024-04-17 DIAGNOSIS — M25.50 POLYARTHRALGIA: ICD-10-CM

## 2024-04-17 DIAGNOSIS — M25.50 POLYARTHRALGIA: Primary | ICD-10-CM

## 2024-04-17 DIAGNOSIS — G47.33 OSA (OBSTRUCTIVE SLEEP APNEA): ICD-10-CM

## 2024-04-17 LAB
BASOPHILS # BLD AUTO: 0 10E3/UL (ref 0–0.2)
BASOPHILS NFR BLD AUTO: 0 %
EOSINOPHIL # BLD AUTO: 0.2 10E3/UL (ref 0–0.7)
EOSINOPHIL NFR BLD AUTO: 2 %
ERYTHROCYTE [DISTWIDTH] IN BLOOD BY AUTOMATED COUNT: 13.2 % (ref 10–15)
ERYTHROCYTE [SEDIMENTATION RATE] IN BLOOD BY WESTERGREN METHOD: 19 MM/HR (ref 0–30)
HCT VFR BLD AUTO: 39.2 % (ref 35–47)
HGB BLD-MCNC: 13.1 G/DL (ref 11.7–15.7)
IMM GRANULOCYTES # BLD: 0 10E3/UL
IMM GRANULOCYTES NFR BLD: 0 %
LYMPHOCYTES # BLD AUTO: 1.5 10E3/UL (ref 0.8–5.3)
LYMPHOCYTES NFR BLD AUTO: 25 %
MCH RBC QN AUTO: 26.9 PG (ref 26.5–33)
MCHC RBC AUTO-ENTMCNC: 33.4 G/DL (ref 31.5–36.5)
MCV RBC AUTO: 81 FL (ref 78–100)
MONOCYTES # BLD AUTO: 0.5 10E3/UL (ref 0–1.3)
MONOCYTES NFR BLD AUTO: 7 %
NEUTROPHILS # BLD AUTO: 4.1 10E3/UL (ref 1.6–8.3)
NEUTROPHILS NFR BLD AUTO: 66 %
PLATELET # BLD AUTO: 257 10E3/UL (ref 150–450)
RBC # BLD AUTO: 4.87 10E6/UL (ref 3.8–5.2)
WBC # BLD AUTO: 6.3 10E3/UL (ref 4–11)

## 2024-04-17 PROCEDURE — 99214 OFFICE O/P EST MOD 30 MIN: CPT | Performed by: INTERNAL MEDICINE

## 2024-04-17 PROCEDURE — 84550 ASSAY OF BLOOD/URIC ACID: CPT

## 2024-04-17 PROCEDURE — 84460 ALANINE AMINO (ALT) (SGPT): CPT

## 2024-04-17 PROCEDURE — 86140 C-REACTIVE PROTEIN: CPT

## 2024-04-17 PROCEDURE — 85025 COMPLETE CBC W/AUTO DIFF WBC: CPT

## 2024-04-17 PROCEDURE — 85652 RBC SED RATE AUTOMATED: CPT

## 2024-04-17 PROCEDURE — 86200 CCP ANTIBODY: CPT

## 2024-04-17 PROCEDURE — 86431 RHEUMATOID FACTOR QUANT: CPT

## 2024-04-17 PROCEDURE — 73030 X-RAY EXAM OF SHOULDER: CPT | Mod: 50

## 2024-04-17 PROCEDURE — 86803 HEPATITIS C AB TEST: CPT

## 2024-04-17 PROCEDURE — 82040 ASSAY OF SERUM ALBUMIN: CPT

## 2024-04-17 PROCEDURE — 82565 ASSAY OF CREATININE: CPT

## 2024-04-17 PROCEDURE — 73130 X-RAY EXAM OF HAND: CPT | Mod: 50

## 2024-04-17 PROCEDURE — G2211 COMPLEX E/M VISIT ADD ON: HCPCS | Performed by: INTERNAL MEDICINE

## 2024-04-17 PROCEDURE — 36415 COLL VENOUS BLD VENIPUNCTURE: CPT

## 2024-04-17 NOTE — PROGRESS NOTES
This document was created using a software with less than 100% fidelity, at times resulting in unintended, even erroneous syntax and grammar.  The reader is advised to keep this under consideration while reviewing, interpreting this note.      Rheumatology Consult Note      Valentina Olmedo     YOB: 1960 Age: 64 year old    Date of visit: 4/17/24    PCP: Jose Maria Morin    Chief Complaint   Patient presents with:  Consult      Assessment and Plan     Valentina was seen today for consult.    Diagnoses and all orders for this visit:    Polyarthralgia  -     XR Shoulder Bilateral 3 Views; Future  -     XR Hand Bilateral G/E 3 Views; Future  -     Erythrocyte sedimentation rate auto; Future  -     CRP inflammation; Future  -     Creatinine; Future  -     Albumin level; Future  -     ALT; Future  -     CBC with Platelets & Differential; Future  -     Hepatitis C antibody; Future  -     Rheumatoid factor; Future  -     Cyclic Citrullinated Peptide Antibody IgG; Future  -     Uric acid; Future    Bilateral hand pain  -     Adult Rheumatology  Referral  -     XR Shoulder Bilateral 3 Views; Future  -     XR Hand Bilateral G/E 3 Views; Future  -     Erythrocyte sedimentation rate auto; Future  -     CRP inflammation; Future  -     Creatinine; Future  -     Albumin level; Future  -     ALT; Future  -     CBC with Platelets & Differential; Future  -     Hepatitis C antibody; Future  -     Rheumatoid factor; Future  -     Cyclic Citrullinated Peptide Antibody IgG; Future  -     Uric acid; Future    CHRIS (obstructive sleep apnea)           This patient has longstanding polyarthralgias, myalgias, she has evidence of lumbar/cervical spondylosis is.  She has had corticosteroid injections with limited benefit and more than 1 joint areas.  She is on anticoagulation therefore not a candidate for nonsteroidals.  Mother berg questions is if she has an active connective tissue disease/inflammatory joint disease the  "likelihood of that appears to be low however further observations workup are indicated.  Meanwhile have talked about poor quality and quantity of sleep in the context of CHRIS can make joint pains and overall achiness significantly worse.  Today's workup as noted once these data are available further course to be charted accordingly we will meet here in the next 3 months.        CHELO   Valentina Olmedo is a 64 year old female  is seen today for evaluation of widespread arthralgias, myalgias going on for the past 5 years accompanied by her .  She noted pain affecting multiple areas across her body besides her neck trapezius thoracolumbar area which she has well-defined lumbar cervical spondylosis she has noted pain in most joints the worst of which are in her hands shoulders and knees.  At this has had troubled her for the past several years some of the symptoms are worse recently.  She has been to [facility] orth[facility] and had injection in her knees which she was not sure was not all that helpful.  She also had an injection in the metacarpophalangeal joints she recalls a #2 and 3.  She has background comorbidities including pulmonary embolism for which she is on indefinite anticoagulation therefore not a candidate for nonsteroidals.  The symptoms are worse with activity.  She is unable to wear her wedding band.  There is history of trauma in the past where she \"broke her back\".  She snores if she has sleep apnea she was given CPAP machine however over the past 3 to 4 years she has not been able to use it because it does not feel right or does not tolerate it and is awaiting another 1.  She has no personal or family history of psoriasis ulcerative colitis Crohn's disease.  As per as scheduled there is no family history of lupus rheumatoid arthritis ankylosing spondylitis.  She noted no stomatitis pleuritic symptoms, she had 2 pregnancies 1 D&C and 1 live birth.  As noted she has history of pulmonary embolism, on " long-term anticoagulation how that happened is unclear to her.  She has noted difficulty performing a variety of day-to-day activities as reviewed in the questionnaire attached in the chart.  Going she has not had any musculoskeletal surgical procedure apart from the corticosteroid injections.  She has not noted photosensitivity.  She quit smoking a year and a bit ago.  .          Active Problem List     Patient Active Problem List   Diagnosis    Essential hypertension    CHRIS (obstructive sleep apnea)    Seasonal and perennial allergic rhinoconjunctivitis of right eye    Closed head injury    History of thyroid cancer    Adrenal mass, left (H24)    Class 3 severe obesity due to excess calories with serious comorbidity and body mass index (BMI) of 50.0 to 59.9 in adult (H)    Nonintractable epilepsy without status epilepticus (H)    Esophageal reflux    Mixed hyperlipidemia    HLD (hyperlipidemia)    Hypothyroidism    Chronic obstructive pulmonary disease (H)    Schizoaffective disorder, depressive type (H)    DNR (do not resuscitate)    Migraine headache    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)    Fracture of vertebra due to osteoporosis with routine healing, subsequent encounter    Major depressive disorder, recurrent episode, moderate (H)    Venous stasis    Peripheral polyneuropathy    Pulmonary embolism with infarction (H)    Idiopathic osteoporosis    Physical deconditioning    PTSD (post-traumatic stress disorder)    Long term (current) use of anticoagulants    History of melanoma    Seasonal affective disorder (H24)    Vision changes    Bilateral hand pain    Problem related to housing and economic circumstances    History of pulmonary embolism    Medial knee pain, right    Bilateral sensorineural hearing loss    Intertrigo    Dysfunction of both eustachian tubes    Hx pulmonary embolism    Age-related cataract of both eyes, unspecified age-related cataract type    Chronic intractable pain    Strain of lumbar  paraspinal muscle, initial encounter    Cervical stenosis of spinal canal    Cervical radiculopathy     Past Medical History     Past Medical History:   Diagnosis Date    Adrenal mass (H24) 10/12/2015    Chen Null MD  They were incidentally discovered on the CAT scan of the abdomen from December 2011 which was done for evaluation of kidney stones. F/up CT of the abdomen done on 6/26/12 showed a stable 5 mm nodular opacity in the right lower lung lobe, adjacent to the diagram. Bilateral adrenal masses average density measured less than 0 Hounsfield units, consistent with benign etio    Anxiety     Anxiety     Arthritis     Borderline personality disorder (H)     Cancer (H)     COPD (chronic obstructive pulmonary disease) (H)     Depression     Depressive disorder     History of COVID-19     Hyperlipidemia     Hypertension     Hypertension     Hyponatremia     Hypothyroidism     Malignant neoplasm of thyroid gland (H)     Chen Null MD  She had R hemithyroidectomy for a right neck tumor in 1994. According to the patient, the tumor was benign. She is not sure whether or not the tumor belonged to the thyroid gland. The surgery was done here at the [place]. In January 2011, she was diagnosed with kidney stones. She was found to have an elevated calcium level of 11.7, with a PTH level of 368. Stone an    Primary hyperparathyroidism (H24)           PTSD (post-traumatic stress disorder)     Pulmonary embolism with infarction (H) 01/12/2021    Recurrent otitis media     Seizure disorder (H)     Stroke (H)     Tobacco abuse     Vertigo      Past Surgical History     Past Surgical History:   Procedure Laterality Date    ABDOMEN SURGERY      ADRENALECTOMY Left     BRAIN SURGERY      CRANIOTOMY FOR TEMPORAL LOBECTOMY Right     x3 for seizure    DILATION AND CURETTAGE      HEAD & NECK SURGERY      HYSTERECTOMY, PAP NO LONGER INDICATED N/A     LITHOTRIPSY      PARATHYROID GLAND SURGERY       resection of adenoma    REFRACTIVE SURGERY Bilateral     RELEASE CARPAL TUNNEL Bilateral     TONSILLECTOMY & ADENOIDECTOMY      TOTAL THYROIDECTOMY      TOTAL VAGINAL HYSTERECTOMY      39 years old. Benign     Allergy     Allergies   Allergen Reactions    Aspirin Shortness Of Breath    Bees Anaphylaxis    Lamictal [Lamotrigine] Dizziness    Latex Itching    Phenobarbital      aggression    Vistaril [Hydroxyzine] Other (See Comments)     Sluggish, unable to wake up    Gabapentin Rash     Current Medication List     Current Outpatient Medications   Medication Sig Dispense Refill    albuterol (PROAIR HFA) 108 (90 Base) MCG/ACT inhaler Inhale 2 puffs into the lungs every 4 hours as needed for shortness of breath or wheezing 8 g 4    ARIPiprazole (ABILIFY) 10 MG tablet Take 1 tablet (10 mg) by mouth every morning 30 tablet 4    azelastine (ASTELIN) 0.1 % nasal spray Spray 1 spray into both nostrils 2 times daily 30 mL 1    Brivaracetam (BRIVIACT) 100 MG tablet Take 150 mg by mouth 2 times daily       calcitonin, salmon, (MIACALCIN) 200 UNIT/ACT nasal spray INSERT 1 SPRAY INTO 1 NOSTRIL, ALTERNATING NOSTRILS EACH DAY 3.7 mL 0    Calcium Citrate-Vitamin D (CALCIUM + D PO) Take 1 tablet by mouth daily  (Patient not taking: Reported on 4/9/2024)      carBAMazepine (TEGRETOL) 200 MG tablet 200mg in the  at lunch and 400mg at HS      Cyanocobalamin (VITAMIN B12) 1000 MCG TBCR Take 1 tablet by mouth      denosumab (PROLIA) 60 MG/ML SOSY injection Inject 60 mg Subcutaneous once Per pt report      DULoxetine (CYMBALTA) 60 MG capsule Take 2 capsules (120 mg) by mouth daily 180 capsule 3    enoxaparin ANTICOAGULANT (LOVENOX) 40 MG/0.4ML syringe Inject 0.4 mLs (40 mg) Subcutaneous every 12 hours Prior to rescheduled procedure. (Patient not taking: Reported on 4/9/2024) 3.2 mL 0    EPINEPHrine (ANY BX GENERIC EQUIV) 0.3 MG/0.3ML injection 2-pack Inject 0.3 mLs (0.3 mg) into the muscle as needed for anaphylaxis 2 each 1     esomeprazole (NEXIUM) 40 MG DR capsule TAKE 1 CAPSULE BY MOUTH EVERY MORNING BEFORE BREAKFAST (TAKE 30-60 MINUTES BEFORE EATING) 28 capsule 2    fexofenadine (ALLEGRA) 180 MG tablet TAKE 1 TABLET BY MOUTH DAILY 90 tablet 3    fluticasone (FLONASE) 50 MCG/ACT nasal spray INHALE 1 SPRAY IN EACH NOSTRIL DAILY Strength: 50 MCG/ACT 16 g 11    hydrochlorothiazide (HYDRODIURIL) 25 MG tablet 2 times daily      levothyroxine (SYNTHROID/LEVOTHROID) 300 MCG tablet Take 1 tablet (300 mcg) by mouth daily 90 tablet 3    losartan (COZAAR) 25 MG tablet Take 0.5 tablets (12.5 mg) by mouth daily 90 tablet 1    meclizine (ANTIVERT) 25 MG tablet       melatonin 3 MG tablet Take 1 tablet (3 mg) by mouth nightly as needed for sleep 90 tablet 4    multivitamin with iron (CHROMAGEN) TABS half-tab Take 1 tablet by mouth daily      Nutritional Supplements (NUTRITIONAL SHAKE HIGH PROTEIN) LIQD Take 1 each by mouth every morning Dispense Premier protein shakes. Replace breakfast with 1 shake daily. (Patient not taking: Reported on 4/9/2024) 9900 mL 11    nystatin (MYCOSTATIN) 701665 UNIT/GM external cream Apply topically 2 times daily as needed (skin infection/inflammation) (Apply to the most sensitive inflamed areas of skin) (Patient not taking: Reported on 4/9/2024) 90 g 2    nystatin (MYCOSTATIN) 894586 UNIT/GM external powder Apply topically 3 times daily as needed for other (fungal skin infection - apply to skin in groin) (Patient not taking: Reported on 4/9/2024) 120 g 1    Polyethylene Glycol POWD See Admin Instructions Use daily as needed      polymixin b-trimethoprim (POLYTRIM) 85602-2.1 UNIT/ML-% ophthalmic solution Place 1-2 drops into the right eye every 4 hours 10 mL 0    prazosin (MINIPRESS) 2 MG capsule Take 1 capsule (2 mg) by mouth at bedtime 90 capsule 1    pregabalin (LYRICA) 50 MG capsule Take 1 capsule (50 mg) by mouth 3 times daily 90 capsule 2    QUEtiapine (SEROQUEL) 25 MG tablet Take 1-2 tablets (25-50 mg) by mouth  daily For agitation. 180 tablet 4    rosuvastatin (CRESTOR) 20 MG tablet TAKE 1 TABLET BY MOUTH DAILY 28 tablet 12    salmeterol (SEREVENT DISKUS) 50 MCG/ACT inhaler INHALE 1 PUFF INTO THE LUNGS TWICE A DAY 60 each 5    senna (SENNA-TIME) 8.6 MG tablet TAKE 2 TABLETS (17.2MG) BY MOUTH TWICE A  tablet 11    umeclidinium (INCRUSE ELLIPTA) 62.5 MCG/ACT inhaler Inhale 1 puff into the lungs daily 30 each 2    varenicline (CHANTIX) 1 MG tablet TAKE 1 TABLET BY MOUTH TWICE A DAY 56 tablet 2    Vibegron (GEMTESA) 75 MG TABS tablet Take 75 mg by mouth daily      vitamin D3 (CHOLECALCIFEROL) 50 mcg (2000 units) tablet TAKE 2 TABLETS BY MOUTH DAILY 180 tablet 3    warfarin ANTICOAGULANT (COUMADIN) 5 MG tablet TAKE 2 TABLETS BY MOUTH ON MON AND FRI ; TAKE 2 & 1/2 TABLETS BY MOUTH ALL OTHER DAYS AS DIRECTED BY INR CLINIC 56 tablet 11    zolpidem (AMBIEN) 5 MG tablet Take tablet by mouth 15 minutes prior to sleep, for Sleep Study (Patient not taking: Reported on 4/9/2024) 1 tablet 0     Current Facility-Administered Medications   Medication Dose Route Frequency Provider Last Rate Last Admin    denosumab (PROLIA) injection 60 mg  60 mg Subcutaneous Q6 Months Alice Rolle MD   60 mg at 11/14/23 1445    denosumab (PROLIA) injection 60 mg  60 mg Subcutaneous Q6 Months Alice Rolle MD           Current Facility-Administered Medications   Medication Dose Route Frequency Provider Last Rate Last Admin        Family History     Family History   Problem Relation Age of Onset    Chronic Obstructive Pulmonary Disease Mother     Other - See Comments Father         estranged    Skin Cancer Father     Lung Cancer Maternal Grandfather 76    Other - See Comments Brother         Missing since 1992    Alcoholism Brother     Diabetes Sister     Depression Sister     Alcoholism Sister     No Known Problems Sister         Half sister         Physical Exam     COMPREHENSIVE EXAMINATION:  Vitals:    04/17/24 1425   BP: 110/78   Pulse: 73  "  SpO2: 96%     A well appearing alert oriented female. Vital data as noted above. Her eyes examined for inflammation/scleromalacia. ENT examined for oral mucositis, moisture, thrush, nasal deformity, external ear redness, deformity. Her neck is examined for suppleness and lymphadenopathy.  Cardiopulmonary examination without dyspnea at rest, use of accessory muscles of breathing, wheezing, edema, peripheral or central cyanosis.  Abdomen examined for softness, tenderness, obvious organomegaly.  Skin examined for heliotrope, malar area eruption, lupus pernio, periungual erythema, sclerodactyly, papules, erythema nodosum, purpura, nail pitting, onycholysis, and obvious psoriasis lesion. Neurological examination done for alertness, speech, facial symmetry,  tone and power in upper and lower extremities, and gait. The joint examination is performed for swelling, tenderness, warmth, erythema, and range of motion in the following joints: DIPs, PIPs, MCPs, wrists, first CMC's, elbows, shoulders, hips, knees, ankles, feet; spine for range of motion and paraspinal muscles for tenderness. The salient normal / abnormal findings are appended.  She is in a wheelchair.  Accompanied by her .  She does not have dactylitis of digits or toes.  She has no synovitis that is palpable in upper or lower extremities, she has mild impingement of the shoulders where she is able to do full 180 degree abduction but a painful arc.  There is no Maller area eruption.  She does not have sclerodactyly periungual erythema.    Labs / Imaging (select studies)     No results found for: \"PPDINDURATIO\", \"PPDREDNESS\", \"TBGOLT\", \"RHF\", \"CCPABG\", \"URIC\", \"DH91484\", \"QT196793\", \"ANTIDNA\", \"ANTIDNAINT\", \"CARIA\", \"JD67318\", \"JA993225\", \"ENASM\", \"ENASCL\", \"JO1\", \"ENASSA\", \"ENASSB\", \"CENTA\", \"M5PDNYY\", \"C8FEBGC\", \"QC3167\"   Hemoglobin   Date Value Ref Range Status   09/07/2023 13.6 11.7 - 15.7 g/dL Final   08/18/2022 14.4 11.7 - 15.7 g/dL Final "   06/29/2021 13.9 12.0 - 16.0 g/dL Final     Hemoglobin POCT   Date Value Ref Range Status   03/15/2022 14.5 g/dL Final     Urea Nitrogen   Date Value Ref Range Status   01/30/2024 14.1 8.0 - 23.0 mg/dL Final   11/14/2023 23.9 (H) 8.0 - 23.0 mg/dL Final   02/17/2023 13.5 8.0 - 23.0 mg/dL Final   04/07/2022 13 8 - 22 mg/dL Final   08/20/2021 14 8 - 22 mg/dL Final   06/29/2021 13 8 - 22 mg/dL Final   11/12/2015 9 7 - 30 mg/dL Final   11/09/2015 28 7 - 30 mg/dL Final   10/01/2012 12 7 - 30 mg/dL Final     AST   Date Value Ref Range Status   01/30/2024 19 0 - 45 U/L Final     Comment:     Reference intervals for this test were updated on 6/12/2023 to more accurately reflect our healthy population. There may be differences in the flagging of prior results with similar values performed with this method. Interpretation of those prior results can be made in the context of the updated reference intervals.   02/17/2023 23 10 - 35 U/L Final   08/18/2022 22 10 - 35 U/L Final   11/13/2015 27 0 - 45 U/L Final   11/09/2015 30 0 - 45 U/L Final     Albumin   Date Value Ref Range Status   01/30/2024 3.9 3.5 - 5.2 g/dL Final   08/18/2022 4.1 3.5 - 5.2 g/dL Final   07/18/2022 4.0 3.5 - 5.2 g/dL Final   08/20/2021 3.6 3.5 - 5.0 g/dL Final   06/29/2021 3.6 3.5 - 5.0 g/dL Final   09/24/2020 3.4 (L) 3.5 - 5.0 g/dL Final   11/13/2015 3.5 3.4 - 5.0 g/dL Final   11/09/2015 3.5 3.4 - 5.0 g/dL Final   10/01/2012 3.8 (L) 3.9 - 5.1 g/dL Final     Alkaline Phosphatase   Date Value Ref Range Status   01/30/2024 73 40 - 150 U/L Final     Comment:     Reference intervals for this test were updated on 11/14/2023 to more accurately reflect our healthy population. There may be differences in the flagging of prior results with similar values performed with this method. Interpretation of those prior results can be made in the context of the updated reference intervals.   08/18/2022 77 35 - 104 U/L Final   07/18/2022 78 35 - 104 U/L Final   11/13/2015 128  40 - 150 U/L Final   11/09/2015 119 40 - 150 U/L Final     ALT   Date Value Ref Range Status   01/30/2024 23 0 - 50 U/L Final     Comment:     Reference intervals for this test were updated on 6/12/2023 to more accurately reflect our healthy population. There may be differences in the flagging of prior results with similar values performed with this method. Interpretation of those prior results can be made in the context of the updated reference intervals.     02/17/2023 29 10 - 35 U/L Final   08/18/2022 25 10 - 35 U/L Final   11/13/2015 50 0 - 50 U/L Final   11/09/2015 53 (H) 0 - 50 U/L Final          Immunization History     Immunization History   Administered Date(s) Administered    COVID-19 12+ (2023-24) (Pfizer) 12/04/2023    COVID-19 Bivalent 12+ (Pfizer) 10/28/2022    COVID-19 MONOVALENT 12+ (Pfizer) 03/15/2021, 04/05/2021, 10/26/2021    COVID-19 Monovalent 12+ (Pfizer 2022) 04/21/2022    DT (PEDS <7y) 01/01/2004    Influenza (H1N1) 01/09/2010    Influenza Vaccine 18-64 (Flublok) 09/24/2020, 09/09/2021, 10/28/2022, 11/14/2023    Pneumococcal 20 valent Conjugate (Prevnar 20) 12/27/2023    Pneumococcal 23 valent 01/12/2021, 10/26/2021    RSV Vaccine (Arexvy) 01/03/2024    TDAP (Adacel,Boostrix) 08/14/2013, 09/09/2023    Td (Adult), Adsorbed 01/01/2004, 08/05/2004    Tdap (Adult) Unspecified Formulation 01/01/2004    Zoster recombinant adjuvanted (SHINGRIX) 12/04/2023, 03/22/2024

## 2024-04-18 DIAGNOSIS — F25.9 SCHIZOAFFECTIVE DISORDER, UNSPECIFIED TYPE (H): ICD-10-CM

## 2024-04-18 LAB
ALBUMIN SERPL BCG-MCNC: 4 G/DL (ref 3.5–5.2)
ALT SERPL W P-5'-P-CCNC: 22 U/L (ref 0–50)
CCP AB SER IA-ACNC: 1 U/ML
CREAT SERPL-MCNC: 0.86 MG/DL (ref 0.51–0.95)
CRP SERPL-MCNC: 10.1 MG/L
EGFRCR SERPLBLD CKD-EPI 2021: 75 ML/MIN/1.73M2
RHEUMATOID FACT SERPL-ACNC: <10 IU/ML
URATE SERPL-MCNC: 3.7 MG/DL (ref 2.4–5.7)

## 2024-04-18 NOTE — TELEPHONE ENCOUNTER
Should have enough supply until around May 12    Date of Last Office Visit: 24  Date of Next Office Visit: 24  No shows since last visit: 0  Cancellations since last visit: 0    Medication requested: ARIPiprazole (ABILIFY) 10 MG tablet  Date last ordered: 24 Qty: 30 Refills: 4      Disp Refills Start End ISABEL   ARIPiprazole (ABILIFY) 10 MG tablet 30 tablet 4 2023 -- No   Sig - Route: Take 1 tablet (10 mg) by mouth every morning - Oral   Sent to pharmacy as: ARIPiprazole 10 MG Oral Tablet (ABILIFY)   Class: E-Prescribe       Lapse in medication adherence greater than 5 days?: no  If yes, call patient and gather details: NA  Medication refill request verified as identical to current order?: yes  Result of Last DAM, VPA, Li+ Level, CBC, or Carbamazepine Level (at or since last visit): N/A    Last visit treatment plan:     Return in about 2 months (around 2024).     Medical Decision Makin. Schizoaffective disorder, depressive type (H)  Abilify started in Dec. Pt reports mood improved since the holidays are over, requested decrease Seroquel due to wt issues.  Decrease Seroquel to 25-50mg prn  Continue:   Abilify 10mg q am.   Cymbalta 120 mg daily.  melatonin 3 mg nightly.     2. Seasonal depression   Improved now that the holidays are over and  headed to spring.      []Medication refilled per  Medication Refill in Ambulatory Care  policy.  [x]Medication unable to be refilled by RN due to criteria not met as indicated below:    []Eligibility - not seen in the last year   []Supervision - no future appointment   []Compliance - no shows, cancellations or lapse in therapy   []Verification - order discrepancy   []Controlled medication   []Medication not included in policy   []90-day supply request   [x]Other: LPN processed

## 2024-04-19 LAB — HCV AB SERPL QL IA: NONREACTIVE

## 2024-04-23 ENCOUNTER — NURSE TRIAGE (OUTPATIENT)
Dept: NURSING | Facility: CLINIC | Age: 64
End: 2024-04-23
Payer: COMMERCIAL

## 2024-04-23 ENCOUNTER — ANTICOAGULATION THERAPY VISIT (OUTPATIENT)
Dept: ANTICOAGULATION | Facility: CLINIC | Age: 64
End: 2024-04-23
Payer: COMMERCIAL

## 2024-04-23 DIAGNOSIS — I26.99 PULMONARY EMBOLISM WITH INFARCTION (H): Primary | ICD-10-CM

## 2024-04-23 DIAGNOSIS — Z86.711 HISTORY OF PULMONARY EMBOLISM: ICD-10-CM

## 2024-04-23 DIAGNOSIS — Z79.01 LONG TERM (CURRENT) USE OF ANTICOAGULANTS: ICD-10-CM

## 2024-04-23 LAB — INR (EXTERNAL): 2 (ref 0.9–1.1)

## 2024-04-23 NOTE — PROGRESS NOTES
ANTICOAGULATION MANAGEMENT     Valentina Olmedo 64 year old female is on warfarin with therapeutic INR result. (Goal INR 2.0-3.0)    Recent labs: (last 7 days)     04/23/24  1445   INR 2.0*       ASSESSMENT     Source(s): Chart Review and Home Care/Facility Nurse     Warfarin doses taken: Warfarin taken as instructed  Diet: No new diet changes identified  Medication/supplement changes: None noted  New illness, injury, or hospitalization: Yes: patient is positive for Covid on Sunday 4/21/24.  Signs or symptoms of bleeding or clotting: No  Previous result: Subtherapeutic  Additional findings: Upcoming surgery/procedure patient stated she may reschedule this procedure due to testing positive for Covid. -reviewed hold plan with home care in case procedure was not cancelled       PLAN     Recommended plan for no diet, medication or health factor changes affecting INR     Dosing Instructions: Continue your current warfarin dose with next INR in 9 days       Summary  As of 4/23/2024      Full warfarin instructions:  4/28: Hold; 4/29: Hold; 4/30: Hold; 5/1: Hold; 5/2: 20 mg; 5/3: 15 mg; Otherwise 10 mg every Sun, Tue, Thu; 7.5 mg all other days   Next INR check:  5/2/2024               Telephone call with Kately[facility] home care nurse who verbalizes understanding and agrees to plan    Orders given to  Homecare nurse/facility to recheck    Education provided:   Please call back if any changes to your diet, medications or how you've been taking warfarin    Plan made per [facility] anticoagulation protocol    Emerita Ca, RN  Anticoagulation Clinic  4/23/2024    _______________________________________________________________________     Anticoagulation Episode Summary       Current INR goal:  2.0-3.0   TTR:  55.1% (1 y)   Target end date:  Indefinite   Send INR reminders to:  [facility]    Indications    History of pulmonary embolism (Resolved) [Z86.711]  Pulmonary embolism with infarction (H) [I26.99]  Long term (current) use of  anticoagulants [Z79.01]  History of pulmonary embolism [Z86.711]             Comments:               Anticoagulation Care Providers       Provider Role Specialty Phone number    Promise Vanegas MD Referring Family Medicine [phone]    Donald Rooney MD Referring Internal Medicine [phone]    Jose Maria Morin MD Referring Family Medicine [phone]

## 2024-04-23 NOTE — TELEPHONE ENCOUNTER
Pt calling to make appt for follow up ED visit;    Question for PCP:  when can I get next covid vaccine?  Covid positive on 4/21/24 in ED at [facility]   Sent home with a nebulizer 2.5 mg every 4 hours,  tylenol as needed  Pt was advised at ED that Paxlovid would not be recommended for her with other medications she currently takes  Still having cough, sore throat, intermittent sweating  productive cough   Headache  Nasal congestion  97.9 temp at time of this call (forehead)  O2 is 96%    Denies;  Severe difficulty breathing/SOB at time of this call  Nausea/vomiting/diarrhea  Severe/constant chest pain/pressure    According to the protocol, patient should be able to monitor/manage these symptoms at home/make follow up ED appt in clinic.  Care advice given/when to call back. Patient verbalizes understanding and agrees with plan of care. Transferred to scheduling.     Sudha Levin RN, Nurse Advisor 3:26 PM 4/23/2024  Reason for Disposition   COVID-19 Disease, questions about    Additional Information   Negative: SEVERE difficulty breathing (e.g., struggling for each breath, speaks in single words)   Negative: Difficult to awaken or acting confused (e.g., disoriented, slurred speech)   Negative: Bluish (or gray) lips or face now   Negative: Shock suspected (e.g., cold/pale/clammy skin, too weak to stand, low BP, rapid pulse)   Negative: Sounds like a life-threatening emergency to the triager   Negative: Diagnosed or suspected COVID-19 and symptoms lasting 3 or more weeks   Negative: COVID-19 exposure and no symptoms   Negative: COVID-19 vaccine reaction suspected (e.g., fever, headache, muscle aches) occurring 1 to 3 days after getting vaccine   Negative: COVID-19 vaccine, questions about   Negative: Lives with someone known to have influenza (flu test positive) and flu-like symptoms (e.g., cough, runny nose, sore throat, SOB; with or without fever)   Negative: Possible COVID-19 symptoms and triager  concerned about severity of symptoms or other causes   Negative: COVID-19 and breastfeeding, questions about   Negative: SEVERE or constant chest pain or pressure  (Exception: Mild central chest pain, present only when coughing.)   Negative: MODERATE difficulty breathing (e.g., speaks in phrases, SOB even at rest, pulse 100-120)   Negative: Headache and stiff neck (can't touch chin to chest)   Negative: Oxygen level (e.g., pulse oximetry) 90% or lower   Negative: Chest pain or pressure  (Exception: MILD central chest pain, present only when coughing.)   Negative: Drinking very little and dehydration suspected (e.g., no urine > 12 hours, very dry mouth, very lightheaded)   Negative: Patient sounds very sick or weak to the triager   Negative: MILD difficulty breathing (e.g., minimal/no SOB at rest, SOB with walking, pulse <100)   Negative: Fever > 103 F (39.4 C)   Negative: Fever > 101 F (38.3 C) and over 60 years of age   Negative: Fever > 100.0 F (37.8 C) and bedridden (e.g., CVA, chronic illness, recovering from surgery)   Negative: HIGH RISK patient (e.g., weak immune system, age > 64 years, obesity with BMI of 30 or higher, pregnant, chronic lung disease or other chronic medical condition) and COVID symptoms (e.g., cough, fever)  (Exceptions: Already seen by doctor or NP/PA and no new or worsening symptoms.)   Negative: HIGH RISK patient and influenza is widespread in the community and ONE OR MORE respiratory symptoms: cough, sore throat, runny or stuffy nose   Negative: HIGH RISK patient and influenza exposure within the last 7 days and ONE OR MORE respiratory symptoms: cough, sore throat, runny or stuffy nose   Negative: Oxygen level (e.g., pulse oximetry) 91 to 94%   Negative: COVID-19 infection suspected by caller or triager and mild symptoms (cough, fever, or others) and negative COVID-19 rapid test   Negative: Fever present > 3 days (72 hours)   Negative: Fever returns after gone for over 24 hours and  symptoms worse or not improved   Negative: Continuous (nonstop) coughing interferes with work or school and no improvement using cough treatment per Care Advice   Negative: Cough present > 3 weeks   Negative: COVID-19 Home Isolation, questions about   Negative: COVID-19 diagnosed by positive lab test (e.g., PCR, rapid self-test kit) and NO symptoms (e.g., cough, fever, others)   Negative: COVID-19 diagnosed by positive lab test (e.g., PCR, rapid self-test kit) and mild symptoms (e.g., cough, fever, others) and no complications or SOB   Negative: COVID-19 diagnosed by doctor (or NP/PA) and mild symptoms (e.g., cough, fever, others) and no complications or SOB   Negative: COVID-19 diagnosed and has mild nausea, vomiting or diarrhea   Negative: COVID-19 infection suspected by caller or triager and mild symptoms (cough, fever, or others) and has not gotten tested yet   Negative: COVID-19 Testing, questions about   Negative: COVID-19 Prevention and Healthy Living, questions about    Protocols used: Coronavirus (COVID-19) Diagnosed or [id number]

## 2024-04-24 ENCOUNTER — MEDICAL CORRESPONDENCE (OUTPATIENT)
Dept: HEALTH INFORMATION MANAGEMENT | Facility: CLINIC | Age: 64
End: 2024-04-24
Payer: COMMERCIAL

## 2024-04-25 ENCOUNTER — OFFICE VISIT (OUTPATIENT)
Dept: FAMILY MEDICINE | Facility: CLINIC | Age: 64
End: 2024-04-25
Payer: COMMERCIAL

## 2024-04-25 ENCOUNTER — TELEPHONE (OUTPATIENT)
Dept: FAMILY MEDICINE | Facility: CLINIC | Age: 64
End: 2024-04-25

## 2024-04-25 ENCOUNTER — TELEPHONE (OUTPATIENT)
Dept: PULMONOLOGY | Facility: CLINIC | Age: 64
End: 2024-04-25

## 2024-04-25 ENCOUNTER — TELEPHONE (OUTPATIENT)
Dept: PHYSICAL MEDICINE AND REHAB | Facility: CLINIC | Age: 64
End: 2024-04-25

## 2024-04-25 VITALS
SYSTOLIC BLOOD PRESSURE: 112 MMHG | HEART RATE: 89 BPM | HEIGHT: 66 IN | DIASTOLIC BLOOD PRESSURE: 75 MMHG | RESPIRATION RATE: 18 BRPM | WEIGHT: 293 LBS | BODY MASS INDEX: 47.09 KG/M2 | OXYGEN SATURATION: 96 % | TEMPERATURE: 98.4 F

## 2024-04-25 DIAGNOSIS — F17.210 CIGARETTE NICOTINE DEPENDENCE: ICD-10-CM

## 2024-04-25 DIAGNOSIS — U07.1 INFECTION DUE TO 2019 NOVEL CORONAVIRUS: Primary | ICD-10-CM

## 2024-04-25 PROCEDURE — 99213 OFFICE O/P EST LOW 20 MIN: CPT | Performed by: FAMILY MEDICINE

## 2024-04-25 RX ORDER — VARENICLINE TARTRATE 1 MG/1
1 TABLET, FILM COATED ORAL 2 TIMES DAILY
Qty: 56 TABLET | Refills: 0 | Status: SHIPPED | OUTPATIENT
Start: 2024-04-25 | End: 2024-05-21

## 2024-04-25 NOTE — TELEPHONE ENCOUNTER
Who's calling:   other              Family/Other name:  [facility]  (Patient/ family calling on behalf of the patient)  On C2C: yes or No: N/A     (Family/friends calling are they on patient Consent to Communicate form)   Reason for call:  Other  Detailed Message: Shani Verduzco [facility] needs medication review and refilled for MIACALCIN  before Monday 4/29/24  Preferred Pharmacy: [address] [place] [address]- [facility]  Call back #: [phone]  Providers name:  Other/ RHETT      Provider/Other name: EDDI NUÑEZ

## 2024-04-25 NOTE — PROGRESS NOTES
"  Assessment & Plan   Problem List Items Addressed This Visit    None  Visit Diagnoses       Infection due to 2019 novel coronavirus    -  Primary           The patient did not appear in any distress today and did not cough throughout the visit.  Her oxygen saturation was stable as well.  I answered her questions as it relates to approach to managing COVID infection as well as symptoms to monitor for.  We discussed that she may be considered infectious for up to 10 days following onset of symptoms but that she does not need to isolate but consider wearing a mask if she is out in public and to avoid being in close proximity for long times with individuals indoors.       MED REC REQUIRED  Post Medication Reconciliation Status: discharge medications reconciled, continue medications without change        Magda Montgomery is a 64 year old who presents today for follow-up from a recent emergency room visit.  The patient was seen on Sunday, April 21 with a complaint of a sore throat, runny nose and cough.  Her onset of symptoms was a day prior on April 20.  She had a chest x-ray which was negative but tested positive for COVID.  She was advised symptomatic treatment and management.  The patient states that her symptoms are definitely improving.  She has never had any fever associated with this.  Her cough is improving and she denies any shortness of breath.  She mostly has questions today as it relates to infectious nature of the illness and what she needs to monitor for.  follow up covid/ doing better      4/25/2024     1:36 PM   Additional Questions   Roomed by as   Accompanied by          4/25/2024     1:36 PM   Patient Reported Additional Medications   Patient reports taking the following new medications no           Objective    /75 (BP Location: Right arm, Patient Position: Right side, Cuff Size: Adult Large)   Pulse 89   Temp 98.4  F (36.9  C) (Oral)   Resp 18   Ht 1.676 m (5' 6\")   Wt (!) 159.9 kg " (352 lb 9.6 oz)   LMP  (LMP Unknown)   SpO2 96%   BMI 56.91 kg/m    Body mass index is 56.91 kg/m .  Physical Exam   GENERAL: alert and no distress            Signed Electronically by: KIERA RENAE MD

## 2024-04-25 NOTE — TELEPHONE ENCOUNTER
Left message to call back for: Valentina  Information to relay to patient: please transfer to clinic nurse to get update on how she feels- appointment 4/25/24 at 2:00 pm- Does she feel up to coming in or would she like to reschedule ER follow up appt?

## 2024-04-25 NOTE — TELEPHONE ENCOUNTER
[facility] Call Center    Phone Message    May a detailed message be left on voicemail: yes     Reason for Call: Medication Refill Request    Has the patient contacted the pharmacy for the refill? Yes   Name of medication being requested: varenicline (CHANTIX) 1 MG tablet   Provider who prescribed the medication: Shawnee Balderas  [place] [facility] [place] [address], [place] [address]  P: [phone]  F: [phone]  Date medication is needed: ASAP; Pt is due for next med cycle delivery     Action Taken: Other: PULM    Travel Screening: Not Applicable

## 2024-04-29 ENCOUNTER — TELEPHONE (OUTPATIENT)
Dept: ANTICOAGULATION | Facility: CLINIC | Age: 64
End: 2024-04-29
Payer: COMMERCIAL

## 2024-04-29 DIAGNOSIS — Z86.711 HISTORY OF PULMONARY EMBOLISM: ICD-10-CM

## 2024-04-29 DIAGNOSIS — I26.99 PULMONARY EMBOLISM WITH INFARCTION (H): Primary | ICD-10-CM

## 2024-04-29 DIAGNOSIS — Z79.01 LONG TERM (CURRENT) USE OF ANTICOAGULANTS: ICD-10-CM

## 2024-04-29 NOTE — TELEPHONE ENCOUNTER
Pharmacy called back re: this request.  State they have not had a response and pt is about to be out of meds.  Please call [phone]

## 2024-04-29 NOTE — TELEPHONE ENCOUNTER
Received a message from Home Care Nurse stating that patient is going to have Colonoscopy procedure this Thursday 5/2 and wondering if INR is still needed or recheck changed to one week after resuming warfarin.  Per Procedure Plan on TE dated  4/8 - sent plan to patient via [employer] on 4/15/24  Reviewed plan again with Home Care nurse and she confirmed that the patient followed the procedure plan.    Given okay to recheck INR on 5/9 ~ one week after resuming warfarin.    Zari Maria RN [facility]

## 2024-04-30 ENCOUNTER — OFFICE VISIT (OUTPATIENT)
Dept: PULMONOLOGY | Facility: CLINIC | Age: 64
End: 2024-04-30
Attending: INTERNAL MEDICINE
Payer: COMMERCIAL

## 2024-04-30 VITALS
BODY MASS INDEX: 56.9 KG/M2 | WEIGHT: 293 LBS | OXYGEN SATURATION: 92 % | DIASTOLIC BLOOD PRESSURE: 84 MMHG | SYSTOLIC BLOOD PRESSURE: 137 MMHG | HEART RATE: 93 BPM

## 2024-04-30 DIAGNOSIS — R06.09 EXERTIONAL DYSPNEA: ICD-10-CM

## 2024-04-30 DIAGNOSIS — G47.30 SLEEP APNEA, UNSPECIFIED TYPE: ICD-10-CM

## 2024-04-30 DIAGNOSIS — R53.81 PHYSICAL DECONDITIONING: ICD-10-CM

## 2024-04-30 PROCEDURE — 99214 OFFICE O/P EST MOD 30 MIN: CPT | Performed by: INTERNAL MEDICINE

## 2024-04-30 RX ORDER — POLYETHYLENE GLYCOL-3350 AND ELECTROLYTES 236; 6.74; 5.86; 2.97; 22.74 G/274.31G; G/274.31G; G/274.31G; G/274.31G; G/274.31G
POWDER, FOR SOLUTION ORAL
COMMUNITY
Start: 2024-04-23

## 2024-04-30 RX ORDER — ARIPIPRAZOLE 10 MG/1
10 TABLET ORAL EVERY MORNING
Qty: 90 TABLET | Refills: 3 | Status: SHIPPED | OUTPATIENT
Start: 2024-04-30 | End: 2024-07-09

## 2024-04-30 RX ORDER — PREDNISOLONE ACETATE 10 MG/ML
SUSPENSION/ DROPS OPHTHALMIC
COMMUNITY
Start: 2024-04-04

## 2024-04-30 RX ORDER — POLYETHYLENE GLYCOL 3350 17 G/17G
POWDER, FOR SOLUTION ORAL
COMMUNITY
Start: 2024-04-23

## 2024-04-30 NOTE — TELEPHONE ENCOUNTER
Misael asking for refill to facilitate processing and shipment.        Date of Last Office Visit: 24  Date of Next Office Visit: 24  No shows since last visit: No  Cancellations since last visit: No    Medication requested: ARIPiprazole (ABILIFY) 10 MG tablet  Date last ordered: 23 Qty: 30 Refills: 4     Lapse in medication adherence greater than 5 days?: no  If yes, call patient and gather details: na  Medication refill request verified as identical to current order?: yes  Result of Last DAM, VPA, Li+ Level, CBC, or Carbamazepine Level (at or since last visit): N/A    Last visit treatment plan:     Medical Decision Makin. Schizoaffective disorder, depressive type (H)  Abilify started in Dec. Pt reports mood improved since the holidays are over, requested decrease Seroquel due to wt issues.  Decrease Seroquel to 25-50mg prn  Continue:   Abilify 10mg q am.   Cymbalta 120 mg daily.  melatonin 3 mg nightly.    Next appt 2-3 mos.     []Medication refilled per  Medication Refill in Ambulatory Care  policy.  [x]Medication unable to be refilled by RN due to criteria not met as indicated below:    []Eligibility - not seen in the last year   []Supervision - no future appointment   []Compliance - no shows, cancellations or lapse in therapy   []Verification - order discrepancy   []Controlled medication   [x]Medication not included in policy   []90-day supply request   []Other

## 2024-05-01 ENCOUNTER — OFFICE VISIT (OUTPATIENT)
Dept: FAMILY MEDICINE | Facility: CLINIC | Age: 64
End: 2024-05-01
Payer: COMMERCIAL

## 2024-05-01 ENCOUNTER — ANESTHESIA EVENT (OUTPATIENT)
Dept: SURGERY | Facility: CLINIC | Age: 64
End: 2024-05-01
Payer: COMMERCIAL

## 2024-05-01 ENCOUNTER — TELEPHONE (OUTPATIENT)
Dept: PULMONOLOGY | Facility: CLINIC | Age: 64
End: 2024-05-01

## 2024-05-01 VITALS
TEMPERATURE: 98.4 F | OXYGEN SATURATION: 95 % | HEART RATE: 90 BPM | DIASTOLIC BLOOD PRESSURE: 80 MMHG | SYSTOLIC BLOOD PRESSURE: 118 MMHG

## 2024-05-01 DIAGNOSIS — Z12.11 COLON CANCER SCREENING: ICD-10-CM

## 2024-05-01 DIAGNOSIS — Z01.818 PREOP GENERAL PHYSICAL EXAM: Primary | ICD-10-CM

## 2024-05-01 DIAGNOSIS — E66.813 CLASS 3 SEVERE OBESITY DUE TO EXCESS CALORIES WITH SERIOUS COMORBIDITY AND BODY MASS INDEX (BMI) OF 50.0 TO 59.9 IN ADULT (H): ICD-10-CM

## 2024-05-01 DIAGNOSIS — K21.9 CHRONIC GERD: ICD-10-CM

## 2024-05-01 DIAGNOSIS — E66.01 CLASS 3 SEVERE OBESITY DUE TO EXCESS CALORIES WITH SERIOUS COMORBIDITY AND BODY MASS INDEX (BMI) OF 50.0 TO 59.9 IN ADULT (H): ICD-10-CM

## 2024-05-01 DIAGNOSIS — Z86.711 HISTORY OF PULMONARY EMBOLISM: ICD-10-CM

## 2024-05-01 LAB
ERYTHROCYTE [DISTWIDTH] IN BLOOD BY AUTOMATED COUNT: 13.3 % (ref 10–15)
HCT VFR BLD AUTO: 39 % (ref 35–47)
HGB BLD-MCNC: 12.6 G/DL (ref 11.7–15.7)
INR PPP: 1.17 (ref 0.85–1.15)
MCH RBC QN AUTO: 26.6 PG (ref 26.5–33)
MCHC RBC AUTO-ENTMCNC: 32.3 G/DL (ref 31.5–36.5)
MCV RBC AUTO: 82 FL (ref 78–100)
PLATELET # BLD AUTO: 212 10E3/UL (ref 150–450)
RBC # BLD AUTO: 4.74 10E6/UL (ref 3.8–5.2)
WBC # BLD AUTO: 5.7 10E3/UL (ref 4–11)

## 2024-05-01 PROCEDURE — 93005 ELECTROCARDIOGRAM TRACING: CPT | Performed by: NURSE PRACTITIONER

## 2024-05-01 PROCEDURE — 85027 COMPLETE CBC AUTOMATED: CPT | Performed by: NURSE PRACTITIONER

## 2024-05-01 PROCEDURE — 36415 COLL VENOUS BLD VENIPUNCTURE: CPT | Performed by: NURSE PRACTITIONER

## 2024-05-01 PROCEDURE — 85610 PROTHROMBIN TIME: CPT | Performed by: NURSE PRACTITIONER

## 2024-05-01 PROCEDURE — 80053 COMPREHEN METABOLIC PANEL: CPT | Performed by: NURSE PRACTITIONER

## 2024-05-01 PROCEDURE — 99214 OFFICE O/P EST MOD 30 MIN: CPT | Performed by: NURSE PRACTITIONER

## 2024-05-01 RX ORDER — LIDOCAINE 40 MG/G
CREAM TOPICAL
Status: CANCELLED | OUTPATIENT
Start: 2024-05-01

## 2024-05-01 RX ORDER — SODIUM CHLORIDE, SODIUM LACTATE, POTASSIUM CHLORIDE, CALCIUM CHLORIDE 600; 310; 30; 20 MG/100ML; MG/100ML; MG/100ML; MG/100ML
INJECTION, SOLUTION INTRAVENOUS CONTINUOUS
Status: CANCELLED | OUTPATIENT
Start: 2024-05-01

## 2024-05-01 ASSESSMENT — PATIENT HEALTH QUESTIONNAIRE - PHQ9: SUM OF ALL RESPONSES TO PHQ QUESTIONS 1-9: 18

## 2024-05-01 NOTE — TELEPHONE ENCOUNTER
DATE: 05/01/2024  DME PROVIDER: Jaime   SUPPLY ORDERED: c pap supply   PROVIDER: Shawnee    Called customer service and spoke to Corinne.    Paula Gonzalez LPN

## 2024-05-01 NOTE — PROGRESS NOTES
Preoperative Evaluation  [facility]  [address] [address]  Phone: [phone]  Fax: [phone]  Primary Provider: Jose Maria Morin  Pre-op Performing Provider: CAITY LUCAS  May 1, 2024       Valentina is a 64 year old, presenting for the following:  Pre-Op Exam (Pre-op physical on 05/02/2024 at [facility] for gastroenterology and colonoscopy, constipation and hard time emptying bladder that started last night 04/30/2024.)        5/1/2024     1:58 PM   Additional Questions   Roomed by ELIA-LPN   Accompanied by SPOUSE     Surgical Information  Surgery/Procedure: GASTROENTEROLOGY AND COLONOSCOPY  Surgery Location: [facility]  Surgeon: DR. JUAN JOSE STEPHENSON  Surgery Date: 05/02/2024  Time of Surgery: TBD  Where patient plans to recover: At home with family  Fax number for surgical facility: N/A    Assessment & Plan     The proposed surgical procedure is considered INTERMEDIATE risk.    Preop general physical exam  Colon cancer screening  Chronic GERD  This is a 64-year-old female primary care provider is Jose Maria Morin, seen here for preop clearance for colonoscopy and EGD scheduled for tomorrow.  Patient has a history of chronic constipation and chronic GERD.  She is being followed by [facility] who plans to do the procedures tomorrow.  Patient has a history of epilepsy, chronic migraines, follow-up polyneuropathy, chronic pain, obstructive sleep apnea, seasonal allergies, COPD with frequent shortness of breath, history of pulmonary embolism on warfarin, morbid obesity with a BMI of 56, hypercholesterolemia, hypothyroidism, hypertension, venous stasis, osteoporosis, chronic back pain, osteoporosis with history of spinal fracture, schizoaffective disorder, MDD, PTSD, seasonal affective disorder, history of adrenal mass    Patient to call GI specialist immediately after today's visit to discuss her concerns around the prep.  I do feel it is important for her to have  a good prep in order to have a successful colonoscopy tomorrow.  Patient to discuss options if unable to consume the recommended amount of prep given chronic constipation.    - EKG 12-lead, tracing only  - CBC with platelets  - Comprehensive metabolic panel (BMP + Alb, Alk Phos, ALT, AST, Total. Bili, TP)  - INR  - CBC with platelets  - Comprehensive metabolic panel (BMP + Alb, Alk Phos, ALT, AST, Total. Bili, TP)  - INR      Class 3 severe obesity due to excess calories with serious comorbidity and body mass index (BMI) of 50.0 to 59.9 in adult (H)  Anesthesiologist to closely monitor airway  History of obstructive sleep apnea and morbid obesity with a BMI 56 today    History of pulmonary embolism  History of pulmonary embolism with patient on warfarin.  Patient has been holding her warfarin appropriately.  Rechecking INR today.  Anesthesiologist and surgeon to be aware of patient's risk of thromboembolism  - INR  - INR      Depression Screening Follow Up  Patient to continue follow-up with primary care provider and mental health team in regards to her depression and passive suicidal ideation.  Patient denies plan today              Follow Up Actions Taken  Crisis resource information provided in the After Visit Summary  Referred patient back to mental health provider    Discussed the following ways the patient can remain in a safe environment:  be around others                Risks and Recommendations  The patient has the following additional risks and recommendations for perioperative complications:   - Morbid obesity (BMI >40)  Cardiovascular:   - Patient to continue to hold warfarin and resume after procedure per surgery recommendation  Obstructive Sleep Apnea:   Surgeon and anesthesiologist to be procedure and postop  -Discussed with patient importance of using CPAP daily    Antiplatelet or Anticoagulation Medication Instructions   - warfarin: Thromboembolic risk is low (<4%/year). HOLD warfarin for 5 days  "without bridging before procedure.     Additional Medication Instructions   - ACE/ARB: HOLD on day of surgery (minimum 11 hours for general anesthesia).   - pregabalin, gabapentin: Continue without modification.   - SSRIs, SNRIs, TCAs, Antipsychotics: Continue without modification.     Recommendation  APPROVAL GIVEN to proceed with proposed procedure, without further diagnostic evaluation.          Subjective       HPI related to upcoming procedure:     Hx of Chronic GERD and chronic constipation - Patient is here for   EGD and and colonoscopy     Drinking the mirilax and she feels like she is \"drowning\"  Last night started having a hard time emptying her bladder - just a dribble, she feels this Is due to the medications she is taking for the colonoscopy   -started polyethyline glycol yesterday - not having any bowel movements - she has drank about 1/2 gallon of fluid.     Hx of epilepsy - seizure     Has a nurse who does her medications at home    At baseline she get SOB, and NICHOLAS with conversation. Not worsening, denies N/V, denies CP, palpitations, recent illness, fever, chills, myalgias, generally feels weak, feels she can ambulate about 100ft without assistance.  Uses wheelchair at home and when out and about.  Able to propel herself on even surfaces.      Holding warfarin since 4/28/24   Start again after the procedure.     Hx of back fracture in wheelchair.     Reviewed [facility] note from 2/8/24 -         5/1/2024     1:48 PM   Preop Questions   1. Have you ever had a heart attack or stroke? YES - stroke - poor memory secondary to previous brain surgeries.    2. Have you ever had surgery on your heart or blood vessels, such as a stent placement, a coronary artery bypass, or surgery on an artery in your head, neck, heart, or legs? No   3. Do you have chest pain with activity? UNKNOWN - chest pain but she feels it related to her GERD   4. Do you have a history of  heart failure? No   5. Do you currently have a cold, " bronchitis or symptoms of other infection? No   6. Do you have a cough, shortness of breath, or wheezing? YES - SOB - gets Sob when talking    7. Do you or anyone in your family have previous history of blood clots? YES - on warfarin - previous HX pf PE   8. Do you or does anyone in your family have a serious bleeding problem such as prolonged bleeding following surgeries or cuts? UNKNOWN - Patient on Warfain - has a lot of bleeding easily since being on this.    9. Have you ever had problems with anemia or been told to take iron pills? No   10. Have you had any abnormal blood loss such as black, tarry or bloody stools, or abnormal vaginal bleeding? YES - black stools - occasionally    11. Have you ever had a blood transfusion? No   12. Are you willing to have a blood transfusion if it is medically needed before, during, or after your surgery? Yes - Patient originally said no - but she is ok with this if it's an emergency    13. Have you or any of your relatives ever had problems with anesthesia? YES - grandmother allergy - pt has had not problems.    14. Do you have sleep apnea, excessive snoring or daytime drowsiness? YES - Patient has CHRIS - getting a new CPAP as hers broke- using nothing now   14a. Do you have a CPAP machine? Yes   15. Do you have any artifical heart valves or other implanted medical devices like a pacemaker, defibrillator, or continuous glucose monitor? No   16. Do you have artificial joints? No   17. Are you allergic to latex? YES: she gets urticaria - other times it doesn't bother her at all       Health Care Directive  Patient does not have a Health Care Directive or Living Will: Discussed advance care planning with patient; information given to patient to review.    Preoperative Review of    reviewed - no record of controlled substances prescribed.          Patient Active Problem List    Diagnosis Date Noted    Chronic intractable pain 04/02/2024     Priority: Medium    Strain of  lumbar paraspinal muscle, initial encounter 04/02/2024     Priority: Medium    Cervical stenosis of spinal canal 04/02/2024     Priority: Medium    Cervical radiculopathy 04/02/2024     Priority: Medium    Age-related cataract of both eyes, unspecified age-related cataract type 03/27/2024     Priority: Medium    Intertrigo 01/03/2024     Priority: Medium    Dysfunction of both eustachian tubes 01/03/2024     Priority: Medium    Medial knee pain, right 11/17/2023     Priority: Medium    Bilateral sensorineural hearing loss 11/17/2023     Priority: Medium    History of pulmonary embolism 07/28/2023     Priority: Medium    Problem related to housing and economic circumstances 07/25/2023     Priority: Medium    Bilateral hand pain 05/26/2023     Priority: Medium    Vision changes 03/09/2023     Priority: Medium    Seasonal affective disorder (H24) 02/19/2023     Priority: Medium    History of melanoma 10/28/2022     Priority: Medium    Long term (current) use of anticoagulants 08/22/2022     Priority: Medium    PTSD (post-traumatic stress disorder) 08/12/2022     Priority: Medium    Physical deconditioning 03/16/2022     Priority: Medium    Idiopathic osteoporosis 10/12/2021     Priority: Medium    Pulmonary embolism with infarction (H) 09/24/2021     Priority: Medium    Peripheral polyneuropathy 08/20/2021     Priority: Medium    Venous stasis 08/08/2021     Priority: Medium    Class 3 severe obesity due to excess calories with serious comorbidity and body mass index (BMI) of 50.0 to 59.9 in adult (H) 08/06/2021     Priority: Medium    Mixed hyperlipidemia 08/06/2021     Priority: Medium    Seasonal and perennial allergic rhinoconjunctivitis of right eye 07/29/2021     Priority: Medium    Essential hypertension 12/22/2020     Priority: Medium    Fracture of vertebra due to osteoporosis with routine healing, subsequent encounter 09/30/2020     Priority: Medium    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 According to the patient, the tumor was benign. She is not sure whether or not the tumor belonged to the thyroid gland. The surgery was done here at the [facility]. In January 2011, she was diagnosed with kidney stones. She was found to have an elevated calcium level of 11.7, with a PTH level of 368. Stone analysis showed calcium oxalate, calcium phosphate and carbonate form of calcium phosphate hydroxyl. She was also found to have vitamin D deficiency with vitamin D levels between 20 and 25 ng per mL and was given 50,000 units ergocalciferol on a weekly basis. She's been taking the high dose vitamin D for over a year now. Most recent labs from October only checked vitamin D3 level which was mildly low at 23.    Thyroid sestamibi scan and neck ultrasound showed enlarged parathyroid adenoma measuring 2.5 cm on the right side. In addition, on the left side, there was a description of a hypoechoic ovoid focus superior and lateral to the left lobe of the thyroid, measuring 1.7 cm, which was considered to represent either a lymph node or another parathyroid adenoma. She underwent completion total thyroidectomy and right superior parathyroidectomy at [facility] with Dr. Keily Gregory on 11/19/11. 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 She is currently on 237  g levothyroxine daily. Last time the dose of levothyroxine was changed was in March 2012. Last TSH from 6/13/12 was 0.42.      Nonintractable epilepsy without status epilepticus (H) 01/07/2020     Priority: Medium    Esophageal reflux 01/07/2020     Priority: Medium    Chronic obstructive pulmonary disease (H) 01/07/2020     Priority: Medium    Schizoaffective disorder, depressive type (H)  02/06/2019     Priority: Medium    DNR (do not resuscitate) 09/13/2018     Priority: Medium    CHRIS (obstructive sleep apnea) 06/12/2018     Priority: Medium    Major depressive disorder, recurrent episode, moderate (H) 09/29/2015     Priority: Medium    Closed head injury 03/04/2015     Priority: Medium     Created by [facility] Annotation: Apr 2 2012  9:53AM - Kirill, Mariah: Fell at work.      HLD (hyperlipidemia) 11/12/2013     Priority: Medium    Hypothyroidism 11/12/2013     Priority: Medium    Migraine headache 11/12/2013     Priority: Medium    Adrenal mass, left (H24) 09/17/2013     Priority: Medium      Past Medical History:   Diagnosis Date    Adrenal mass (H24) 10/12/2015    Chen Null MD  They were incidentally discovered on the CAT scan of the abdomen from December 2011 which was done for evaluation of kidney stones. F/up CT of the abdomen done on 6/26/12 showed a stable 5 mm nodular opacity in the right lower lung lobe, adjacent to the diagram. Bilateral adrenal masses average density measured less than 0 Hounsfield units, consistent with benign etio    Anxiety     Anxiety     Arthritis     Borderline personality disorder (H)     Cancer (H)     COPD (chronic obstructive pulmonary disease) (H)     Depression     Depressive disorder     History of COVID-19     Hyperlipidemia     Hypertension     Hypertension     Hyponatremia     Hypothyroidism     Malignant neoplasm of thyroid gland (H)     Chen Null MD  She had R hemithyroidectomy for a right neck tumor in 1994. According to the patient, the tumor was benign. She is not sure whether or not the tumor belonged to the thyroid gland. The surgery was done here at the [facility]. In January 2011, she was diagnosed with kidney stones. She was found to have an elevated calcium level of 11.7, with a PTH level of 368. Stone an    Primary hyperparathyroidism (H24)           PTSD (post-traumatic stress disorder)      Pulmonary embolism with infarction (H) 01/12/2021    Recurrent otitis media     Seizure disorder (H)     Sleep apnea     cpap at [facility]    Stroke (H)     Tobacco abuse     Vertigo      Past Surgical History:   Procedure Laterality Date    ABDOMEN SURGERY      ADRENALECTOMY Left     BRAIN SURGERY      CRANIOTOMY FOR TEMPORAL LOBECTOMY Right     x3 for seizure    DILATION AND CURETTAGE      HEAD & NECK SURGERY      HYSTERECTOMY, PAP NO LONGER INDICATED N/A     LITHOTRIPSY      PARATHYROID GLAND SURGERY      resection of adenoma    REFRACTIVE SURGERY Bilateral     RELEASE CARPAL TUNNEL Bilateral     TONSILLECTOMY & ADENOIDECTOMY      TOTAL THYROIDECTOMY      TOTAL VAGINAL HYSTERECTOMY      39 years old. Benign     Current Outpatient Medications   Medication Sig Dispense Refill    albuterol (PROAIR HFA) 108 (90 Base) MCG/ACT inhaler Inhale 2 puffs into the lungs every 4 hours as needed for shortness of breath or wheezing 8 g 4    ARIPiprazole (ABILIFY) 10 MG tablet Take 1 tablet (10 mg) by mouth every morning 90 tablet 3    azelastine (ASTELIN) 0.1 % nasal spray Spray 1 spray into both nostrils 2 times daily 30 mL 1    Brivaracetam (BRIVIACT) 100 MG tablet Take 150 mg by mouth 2 times daily       calcitonin, salmon, (MIACALCIN) 200 UNIT/ACT nasal spray INSERT 1 SPRAY INTO 1 NOSTRIL, ALTERNATING NOSTRILS EACH DAY 3.7 mL 0    carBAMazepine (TEGRETOL) 200 MG tablet 200mg in the  at lunch and 400mg at HS      Cyanocobalamin (VITAMIN B12) 1000 MCG TBCR Take 1 tablet by mouth      denosumab (PROLIA) 60 MG/ML SOSY injection Inject 60 mg Subcutaneous once Per pt report      DULoxetine (CYMBALTA) 60 MG capsule Take 2 capsules (120 mg) by mouth daily 180 capsule 3    EPINEPHrine (ANY BX GENERIC EQUIV) 0.3 MG/0.3ML injection 2-pack Inject 0.3 mLs (0.3 mg) into the muscle as needed for anaphylaxis 2 each 1    esomeprazole (NEXIUM) 40 MG DR capsule TAKE 1 CAPSULE BY MOUTH EVERY MORNING BEFORE BREAKFAST (TAKE 30-60 MINUTES BEFORE  EATING) 28 capsule 2    fexofenadine (ALLEGRA) 180 MG tablet TAKE 1 TABLET BY MOUTH DAILY 90 tablet 3    fluticasone (FLONASE) 50 MCG/ACT nasal spray INHALE 1 SPRAY IN EACH NOSTRIL DAILY Strength: 50 MCG/ACT 16 g 11    GAVILYTE-G 236 g suspension       levothyroxine (SYNTHROID/LEVOTHROID) 300 MCG tablet Take 1 tablet (300 mcg) by mouth daily 90 tablet 3    losartan (COZAAR) 25 MG tablet Take 0.5 tablets (12.5 mg) by mouth daily 90 tablet 1    melatonin 3 MG tablet Take 1 tablet (3 mg) by mouth nightly as needed for sleep 90 tablet 4    multivitamin with iron (CHROMAGEN) TABS half-tab Take 1 tablet by mouth daily      Nutritional Supplements (NUTRITIONAL SHAKE HIGH PROTEIN) LIQD Take 1 each by mouth every morning Dispense Premier protein shakes. Replace breakfast with 1 shake daily. 9900 mL 11    nystatin (MYCOSTATIN) 776510 UNIT/GM external cream Apply topically 2 times daily as needed (skin infection/inflammation) (Apply to the most sensitive inflamed areas of skin) 90 g 2    polyethylene glycol (MIRALAX) 17 GM/Dose powder       polymixin b-trimethoprim (POLYTRIM) 96060-1.1 UNIT/ML-% ophthalmic solution Place 1-2 drops into the right eye every 4 hours 10 mL 0    prazosin (MINIPRESS) 2 MG capsule Take 1 capsule (2 mg) by mouth at bedtime 90 capsule 1    prednisoLONE acetate (PRED FORTE) 1 % ophthalmic suspension       pregabalin (LYRICA) 50 MG capsule Take 1 capsule (50 mg) by mouth 3 times daily 90 capsule 2    QUEtiapine (SEROQUEL) 25 MG tablet Take 1-2 tablets (25-50 mg) by mouth daily For agitation. 180 tablet 4    rosuvastatin (CRESTOR) 20 MG tablet TAKE 1 TABLET BY MOUTH DAILY 28 tablet 12    salmeterol (SEREVENT DISKUS) 50 MCG/ACT inhaler INHALE 1 PUFF INTO THE LUNGS TWICE A DAY 60 each 5    senna (SENNA-TIME) 8.6 MG tablet TAKE 2 TABLETS (17.2MG) BY MOUTH TWICE A  tablet 11    umeclidinium (INCRUSE ELLIPTA) 62.5 MCG/ACT inhaler Inhale 1 puff into the lungs daily 30 each 2    varenicline (CHANTIX) 1 MG  "tablet Take 1 tablet (1 mg) by mouth 2 times daily 56 tablet 0    Vibegron (GEMTESA) 75 MG TABS tablet Take 75 mg by mouth daily      vitamin D3 (CHOLECALCIFEROL) 50 mcg (2000 units) tablet TAKE 2 TABLETS BY MOUTH DAILY 180 tablet 3    warfarin ANTICOAGULANT (COUMADIN) 5 MG tablet TAKE 2 TABLETS BY MOUTH ON MON AND FRI ; TAKE 2 & 1/2 TABLETS BY MOUTH ALL OTHER DAYS AS DIRECTED BY INR CLINIC 56 tablet 11    zolpidem (AMBIEN) 5 MG tablet Take tablet by mouth 15 minutes prior to sleep, for Sleep Study 1 tablet 0       Allergies   Allergen Reactions    Aspirin Shortness Of Breath    Bees Anaphylaxis    Lamictal [Lamotrigine] Dizziness    Latex Itching    Phenobarbital      aggression    Vistaril [Hydroxyzine] Other (See Comments)     Sluggish, unable to wake up    Gabapentin Rash        Social History     Tobacco Use    Smoking status: Former     Types: Cigarettes     Passive exposure: Never    Smokeless tobacco: Never    Tobacco comments:     Quit about 5 months ago on Chantix.     Substance Use Topics    Alcohol use: No     Comment: None.       History   Drug Use No         Review of Systems      Objective    /80   Pulse 90   Temp 98.4  F (36.9  C) (Oral)   LMP  (LMP Unknown)   SpO2 95%    Estimated body mass index is 56.9 kg/m  as calculated from the following:    Height as of 4/25/24: 1.676 m (5' 6\").    Weight as of 4/30/24: 159.9 kg (352 lb 8.3 oz).  Physical Exam  Constitutional:       General: She is not in acute distress.     Appearance: She is obese. She is not ill-appearing.   HENT:      Head: Normocephalic.      Right Ear: Tympanic membrane and ear canal normal.      Nose: Congestion present.   Eyes:      Extraocular Movements: Extraocular movements intact.      Conjunctiva/sclera: Conjunctivae normal.      Pupils: Pupils are equal, round, and reactive to light.   Cardiovascular:      Rate and Rhythm: Normal rate and regular rhythm.      Pulses: Normal pulses.   Pulmonary:      Effort: No " respiratory distress.      Breath sounds: Normal breath sounds. No wheezing.   Abdominal:      General: Bowel sounds are normal. There is distension.      Palpations: There is no mass.      Tenderness: There is abdominal tenderness.   Musculoskeletal:      Cervical back: Normal range of motion and neck supple.      Comments: Patient in her own wheelchair, able to stand but not able to walk quickly, steady with one assist.    Neurological:      Mental Status: She is alert and oriented to person, place, and time.      Motor: Weakness present.           Recent Labs   Lab Test 04/23/24  1445 04/17/24  1529 04/16/24  1004 02/07/24  0000 01/30/24  1001 11/15/23  1437 11/14/23  1457 09/12/23  1419 09/07/23  1211 09/01/22  1401 08/18/22  1405   HGB  --  13.1  --   --   --   --   --   --  13.6  --  14.4   PLT  --  257  --   --   --   --   --   --   --   --  245   INR 2.0*  --  1.5*   < >  --    < >  --    < >  --    < >  --    NA  --   --   --   --  143  --  141  --   --    < > 141   POTASSIUM  --   --   --   --  4.0  --  4.0  --   --    < > 4.0   CR  --  0.86  --   --  0.66  --  0.65  --   --    < > 0.78   A1C  --   --   --   --  5.2  --   --   --   --   --   --     < > = values in this interval not displayed.        Diagnostics  Results for orders placed or performed in visit on 05/01/24   CBC with platelets     Status: Normal   Result Value Ref Range    WBC Count 5.7 4.0 - 11.0 10e3/uL    RBC Count 4.74 3.80 - 5.20 10e6/uL    Hemoglobin 12.6 11.7 - 15.7 g/dL    Hematocrit 39.0 35.0 - 47.0 %    MCV 82 78 - 100 fL    MCH 26.6 26.5 - 33.0 pg    MCHC 32.3 31.5 - 36.5 g/dL    RDW 13.3 10.0 - 15.0 %    Platelet Count 212 150 - 450 10e3/uL   EKG 12-lead, tracing only     Status: None (Preliminary result)   Result Value Ref Range    Systolic Blood Pressure  mmHg    Diastolic Blood Pressure  mmHg    Ventricular Rate 79 BPM    Atrial Rate 79 BPM    WI Interval 212 ms    QRS Duration 90 ms     ms    QTc 454 ms    P Axis 60  degrees    R AXIS -4 degrees    T Axis 33 degrees    Interpretation ECG       Sinus rhythm with 1st degree A-V block  Cannot rule out Anterior infarct , age undetermined  Abnormal ECG  When compared with ECG of 09-DEC-2019 14:23,  Nonspecific T wave abnormality has replaced inverted T waves in Anterior leads       Infarct likely due to lead placement and low voltage.  Likely due to acute infarct at this time given.  Patient's presentation history    Revised Cardiac Risk Index (RCRI)  The patient has the following serious cardiovascular risks for perioperative complications:   - No serious cardiac risks = 0 points     RCRI Interpretation: 0 points: Class I (very low risk - 0.4% complication rate)         Signed Electronically by: SHAHRZAD Ryder CNP  Copy of this evaluation report is provided to requesting physician.

## 2024-05-01 NOTE — PATIENT INSTRUCTIONS
Preparing for Your Surgery  Getting started  A nurse will call you to review your health history and instructions. They will give you an arrival time based on your scheduled surgery time. Please be ready to share:  Your doctor's clinic name and phone number  Your medical, surgical, and anesthesia history  A list of allergies and sensitivities  A list of medicines, including herbal treatments and over-the-counter drugs  Whether the patient has a legal guardian (ask how to send us the papers in advance)  Please tell us if you're pregnant--or if there's any chance you might be pregnant. Some surgeries may injure a fetus (unborn baby), so they require a pregnancy test. Surgeries that are safe for a fetus don't always need a test, and you can choose whether to have one.   If you have a child who's having surgery, please ask for a copy of Preparing for Your Child's Surgery.    Preparing for surgery  Within 10 to 30 days of surgery: Have a pre-op exam (sometimes called an H&P, or History and Physical). This can be done at a clinic or pre-operative center.  If you're having a , you may not need this exam. Talk to your care team.  At your pre-op exam, talk to your care team about all medicines you take. If you need to stop any medicines before surgery, ask when to start taking them again.  We do this for your safety. Many medicines can make you bleed too much during surgery. Some change how well surgery (anesthesia) drugs work.  Call your insurance company to let them know you're having surgery. (If you don't have insurance, call [phone].)  Call your clinic if there's any change in your health. This includes signs of a cold or flu (sore throat, runny nose, cough, rash, fever). It also includes a scrape or scratch near the surgery site.  If you have questions on the day of surgery, call your hospital or surgery center.  Eating and drinking guidelines  For your safety: Unless your surgeon tells you otherwise,  follow the guidelines below.  Eat and drink as usual until 8 hours before you arrive for surgery. After that, no food or milk.  Drink clear liquids until 2 hours before you arrive. These are liquids you can see through, like water, Gatorade, and Propel Water. They also include plain black coffee and tea (no cream or milk), candy, and breath mints. You can spit out gum when you arrive.  If you drink alcohol: Stop drinking it the night before surgery.  If your care team tells you to take medicine on the morning of surgery, it's okay to take it with a sip of water.  Preventing infection  Shower or bathe the night before and morning of your surgery. Follow the instructions your clinic gave you. (If no instructions, use regular soap.)  Don't shave or clip hair near your surgery site. We'll remove the hair if needed.  Don't smoke or vape the morning of surgery. You may chew nicotine gum up to 2 hours before surgery. A nicotine patch is okay.  Note: Some surgeries require you to completely quit smoking and nicotine. Check with your surgeon.  Your care team will make every effort to keep you safe from infection. We will:  Clean our hands often with soap and water (or an alcohol-based hand rub).  Clean the skin at your surgery site with a special soap that kills germs.  Give you a special gown to keep you warm. (Cold raises the risk of infection.)  Wear special hair covers, masks, gowns and gloves during surgery.  Give antibiotic medicine, if prescribed. Not all surgeries need antibiotics.  What to bring on the day of surgery  Photo ID and insurance card  Copy of your health care directive, if you have one  Glasses and hearing aids (bring cases)  You can't wear contacts during surgery  Inhaler and eye drops, if you use them (tell us about these when you arrive)  CPAP machine or breathing device, if you use them  A few personal items, if spending the night  If you have . . .  A pacemaker, ICD (cardiac defibrillator) or other  implant: Bring the ID card.  An implanted stimulator: Bring the remote control.  A legal guardian: Bring a copy of the certified (court-stamped) guardianship papers.  Please remove any jewelry, including body piercings. Leave jewelry and other valuables at home.  If you're going home the day of surgery  You must have a responsible adult drive you home. They should stay with you overnight as well.  If you don't have someone to stay with you, and you aren't safe to go home alone, we may keep you overnight. Insurance often won't pay for this.  After surgery  If it's hard to control your pain or you need more pain medicine, please call your surgeon's office.  Questions?   If you have any questions for your care team, list them here: _________________________________________________________________________________________________________________________________________________________________________ ____________________________________ ____________________________________ ____________________________________  For informational purposes only. Not to replace the advice of your health care provider. Copyright   2003, 2019 [email or web address]. All rights reserved. Clinically reviewed by Viky Valdes MD. [employer] 823598 - REV 12/22.    How to Take Your Medication Before Surgery  Hold losartan and warfarin on the day of the procedure

## 2024-05-01 NOTE — PROGRESS NOTES
PULMONARY OUTPATIENT FOLLOW UP NOTE      Assessment:      Dyspnea  Secondary to significant deconditioning, body habitus.   Underlying severe sleep apnea.   Significant tobacco use in the past, 1/2 ppd for 45 years, quit February 2023. Reports outdoors allergies.   PFT showed normal spirometry, lung volumes and diffusion capacity.   Recently started on LAMA/LABA without change in her breathing.   Plan to discontinue LAMA/LABA.   Continue albuterol HFA as needed. If worsening respiratory symptoms after stopping inhalers, add ICS/LABA.   Patient participated in pulmonary rehab with improvement in exercise capacity, unfortunately stopped sessions, she agrees in new rehab referral   Allergic rhinitis   Nasal steroid  GERD  Avoid eating close to bedtime. Raise the head of the bed  Continue nexium daily and increase to twice a day if persistent acid reflux symptoms.   Sleep apnea   PSG ( 2/3/2021) AHI 91, SpO2 sheri 78.6%, 9.2 minutes <= 88%.   Patient states that AutoCPAP machine is broken and she would like a new one.   Will order AutoCPAP 5-20 cmH20  History of pulmonary embolism   On long term anticoagulation   Physical deconditioning   Referral to pulmonary rehabilitation   Previous tobacco use  Significant tobacco use in the past, 1/2 ppd for 45 years, quit February 2023.   Smoking cessation counseling  Morbid obesity Body mass index is 56.9 kg/m .      Plan:      Continue albuterol HFA or duonebs every six hours as needed  Hold serevent and incruse  Prescription for CPAP therapy  Avoid eating close to bedtime  Raise the head of the bed  Eat in the upright position   Continue nexium daily and increase to twice a day if persistent acid reflux symptoms  Referral to pulmonary rehabilitation   Call me back in 2 to 3 weeks to let me know how are your respiratory symptoms. If worsening cough or wheezes, plan to start ICS/LABA.   Follow up 3 months     Sen Balderas MD  Pulmonary Medicine  04/30/24    DME  (Durable Medical Equipment) Orders and Documentation    AutoCPAP machine     The patient was assessed and it was determined the patient is in need of the following listed DME Supplies/Equipment. Please complete supporting documentation below to demonstrate medical necessity.      DME All Other Item(s) Documentation    List reason for need and supporting documentation for medical necessity below for each DME item.   1. Severe sleep apnea. PSG ( 2/3/2021) AHI 91, SpO2 sheri 78.6%, 9.2 minutes <= 88%.        CC:     Chief Complaint   Patient presents with    Follow Up       6 month follow up:  Sleep apnea, unspecified type  Exertional dyspnea  Physical deconditioning  Morbid obesity (H)   Has been having more shortness of breath; has to take an extra breath to finish a sentence      HPI:      Valentina Olmedo is a 64 year old female who presents for evaluation dyspnea.  Patient has history of HTN, hypothyroidism, GERD, schizoaffective disorder, anxiety disorder, PTSD, history of pulmonary embolism anticoagulated, CHRIS, obesity BMI 56, previous tobacco user.   Limited mobility, uses walker in short distance, here in a wheelchair.   Diagnosed with COVID19 viral infection on 4/21/2024.   Patient reports doing well.   Activity level is very limited. While on pulmonary rehab, patient reports improvement of her breathing. Unfortunately , patient stopped rehab sessions.   Patient quit tobacco use on February 2023.   Patient was started on LAMA/LABA but she did not notice any significant change in her breathing. Uses Albuterol HFA as needed.   Denies chest pain, orthopnea or PND, no swelling of LEs.   Denies fever, chills or night sweats.   Mild postnasal drip, denies headaches, or sinus tenderness.   Acid reflux symptoms daily , takes nexium   Patient is on long term anticoagulation for history of pulmonary embolism.  Not using CPAP machine, because is not working.     Past Medical History :     Past Medical History:   Diagnosis  Date    Adrenal mass (H24) 10/12/2015    Chen Null MD  They were incidentally discovered on the CAT scan of the abdomen from December 2011 which was done for evaluation of kidney stones. F/up CT of the abdomen done on 6/26/12 showed a stable 5 mm nodular opacity in the right lower lung lobe, adjacent to the diagram. Bilateral adrenal masses average density measured less than 0 Hounsfield units, consistent with benign etio    Anxiety     Anxiety     Arthritis     Borderline personality disorder (H)     Cancer (H)     COPD (chronic obstructive pulmonary disease) (H)     Depression     Depressive disorder     History of COVID-19     Hyperlipidemia     Hypertension     Hypertension     Hyponatremia     Hypothyroidism     Malignant neoplasm of thyroid gland (H)     Chen Null MD  She had R hemithyroidectomy for a right neck tumor in 1994. According to the patient, the tumor was benign. She is not sure whether or not the tumor belonged to the thyroid gland. The surgery was done here at the [facility]. In January 2011, she was diagnosed with kidney stones. She was found to have an elevated calcium level of 11.7, with a PTH level of 368. Stone an    Primary hyperparathyroidism (H24)           PTSD (post-traumatic stress disorder)     Pulmonary embolism with infarction (H) 01/12/2021    Recurrent otitis media     Seizure disorder (H)     Sleep apnea     cpap at [facility]    Stroke (H)     Tobacco abuse     Vertigo         Medications:     Current Outpatient Medications   Medication Sig Dispense Refill    albuterol (PROAIR HFA) 108 (90 Base) MCG/ACT inhaler Inhale 2 puffs into the lungs every 4 hours as needed for shortness of breath or wheezing 8 g 4    ARIPiprazole (ABILIFY) 10 MG tablet Take 1 tablet (10 mg) by mouth every morning 90 tablet 3    azelastine (ASTELIN) 0.1 % nasal spray Spray 1 spray into both nostrils 2 times daily 30 mL 1    Brivaracetam (BRIVIACT) 100 MG tablet Take 150 mg by mouth  2 times daily       calcitonin, salmon, (MIACALCIN) 200 UNIT/ACT nasal spray INSERT 1 SPRAY INTO 1 NOSTRIL, ALTERNATING NOSTRILS EACH DAY 3.7 mL 0    carBAMazepine (TEGRETOL) 200 MG tablet 200mg in the  at lunch and 400mg at HS      Cyanocobalamin (VITAMIN B12) 1000 MCG TBCR Take 1 tablet by mouth      denosumab (PROLIA) 60 MG/ML SOSY injection Inject 60 mg Subcutaneous once Per pt report      DULoxetine (CYMBALTA) 60 MG capsule Take 2 capsules (120 mg) by mouth daily 180 capsule 3    EPINEPHrine (ANY BX GENERIC EQUIV) 0.3 MG/0.3ML injection 2-pack Inject 0.3 mLs (0.3 mg) into the muscle as needed for anaphylaxis 2 each 1    esomeprazole (NEXIUM) 40 MG DR capsule TAKE 1 CAPSULE BY MOUTH EVERY MORNING BEFORE BREAKFAST (TAKE 30-60 MINUTES BEFORE EATING) 28 capsule 2    fexofenadine (ALLEGRA) 180 MG tablet TAKE 1 TABLET BY MOUTH DAILY 90 tablet 3    fluticasone (FLONASE) 50 MCG/ACT nasal spray INHALE 1 SPRAY IN EACH NOSTRIL DAILY Strength: 50 MCG/ACT 16 g 11    GAVILYTE-G 236 g suspension       levothyroxine (SYNTHROID/LEVOTHROID) 300 MCG tablet Take 1 tablet (300 mcg) by mouth daily 90 tablet 3    losartan (COZAAR) 25 MG tablet Take 0.5 tablets (12.5 mg) by mouth daily 90 tablet 1    melatonin 3 MG tablet Take 1 tablet (3 mg) by mouth nightly as needed for sleep 90 tablet 4    multivitamin with iron (CHROMAGEN) TABS half-tab Take 1 tablet by mouth daily      Nutritional Supplements (NUTRITIONAL SHAKE HIGH PROTEIN) LIQD Take 1 each by mouth every morning Dispense Premier protein shakes. Replace breakfast with 1 shake daily. 9900 mL 11    nystatin (MYCOSTATIN) 297842 UNIT/GM external cream Apply topically 2 times daily as needed (skin infection/inflammation) (Apply to the most sensitive inflamed areas of skin) 90 g 2    polyethylene glycol (MIRALAX) 17 GM/Dose powder       polymixin b-trimethoprim (POLYTRIM) 60542-0.1 UNIT/ML-% ophthalmic solution Place 1-2 drops into the right eye every 4 hours 10 mL 0     prazosin (MINIPRESS) 2 MG capsule Take 1 capsule (2 mg) by mouth at bedtime 90 capsule 1    prednisoLONE acetate (PRED FORTE) 1 % ophthalmic suspension       pregabalin (LYRICA) 50 MG capsule Take 1 capsule (50 mg) by mouth 3 times daily 90 capsule 2    QUEtiapine (SEROQUEL) 25 MG tablet Take 1-2 tablets (25-50 mg) by mouth daily For agitation. 180 tablet 4    rosuvastatin (CRESTOR) 20 MG tablet TAKE 1 TABLET BY MOUTH DAILY 28 tablet 12    salmeterol (SEREVENT DISKUS) 50 MCG/ACT inhaler INHALE 1 PUFF INTO THE LUNGS TWICE A DAY 60 each 5    senna (SENNA-TIME) 8.6 MG tablet TAKE 2 TABLETS (17.2MG) BY MOUTH TWICE A  tablet 11    umeclidinium (INCRUSE ELLIPTA) 62.5 MCG/ACT inhaler Inhale 1 puff into the lungs daily 30 each 2    varenicline (CHANTIX) 1 MG tablet Take 1 tablet (1 mg) by mouth 2 times daily 56 tablet 0    Vibegron (GEMTESA) 75 MG TABS tablet Take 75 mg by mouth daily      vitamin D3 (CHOLECALCIFEROL) 50 mcg (2000 units) tablet TAKE 2 TABLETS BY MOUTH DAILY 180 tablet 3    warfarin ANTICOAGULANT (COUMADIN) 5 MG tablet TAKE 2 TABLETS BY MOUTH ON MON AND FRI ; TAKE 2 & 1/2 TABLETS BY MOUTH ALL OTHER DAYS AS DIRECTED BY INR CLINIC 56 tablet 11    zolpidem (AMBIEN) 5 MG tablet Take tablet by mouth 15 minutes prior to sleep, for Sleep Study 1 tablet 0     Current Facility-Administered Medications   Medication Dose Route Frequency Provider Last Rate Last Admin    denosumab (PROLIA) injection 60 mg  60 mg Subcutaneous Q6 Months Alice Rolle MD   60 mg at 11/14/23 1445    denosumab (PROLIA) injection 60 mg  60 mg Subcutaneous Q6 Months Alice Rolle MD           Social History :     Social History     Socioeconomic History    Marital status:      Spouse name: Not on file    Number of children: Not on file    Years of education: Not on file    Highest education level: Not on file   Occupational History    Not on file   Tobacco Use    Smoking status: Former     Types: Cigarettes     Passive  exposure: Never    Smokeless tobacco: Never    Tobacco comments:     Quit about 5 months ago on Chantix.     Vaping Use    Vaping status: Never Used   Substance and Sexual Activity    Alcohol use: No     Comment: None.    Drug use: No    Sexual activity: Not Currently     Partners: Male   Other Topics Concern    Not on file   Social History Narrative    Not on file     Social Determinants of Health     Financial Resource Strain: High Risk (1/3/2024)    Financial Resource Strain     Within the past 12 months, have you or your family members you live with been unable to get utilities (heat, electricity) when it was really needed?: Yes   Food Insecurity: High Risk (1/3/2024)    Food Insecurity     Within the past 12 months, did you worry that your food would run out before you got money to buy more?: Yes     Within the past 12 months, did the food you bought just not last and you didn t have money to get more?: Yes   Transportation Needs: Low Risk  (1/3/2024)    Transportation Needs     Within the past 12 months, has lack of transportation kept you from medical appointments, getting your medicines, non-medical meetings or appointments, work, or from getting things that you need?: No   Physical Activity: Not on file   Stress: Not on file   Social Connections: Not on file   Interpersonal Safety: Low Risk  (3/27/2024)    Interpersonal Safety     Do you feel physically and emotionally safe where you currently live?: Yes     Within the past 12 months, have you been hit, slapped, kicked or otherwise physically hurt by someone?: No     Within the past 12 months, have you been humiliated or emotionally abused in other ways by your partner or ex-partner?: No   Housing Stability: Low Risk  (1/3/2024)    Housing Stability     Do you have housing? : Yes     Are you worried about losing your housing?: No   Recent Concern: Housing Stability - High Risk (11/17/2023)    Housing Stability     Do you have housing? : No     Are you  worried about losing your housing?: Patient refused          Family History :     Family History   Problem Relation Age of Onset    Chronic Obstructive Pulmonary Disease Mother     Other - See Comments Father         estranged    Skin Cancer Father     Lung Cancer Maternal Grandfather 76    Other - See Comments Brother         Missing since 1992    Alcoholism Brother     Diabetes Sister     Depression Sister     Alcoholism Sister     No Known Problems Sister         Half sister       Review of Systems  A 12 point comprehensive review of systems was negative except as noted.        Objective:     /84 (BP Location: Right arm, Patient Position: Sitting, Cuff Size: Adult Large)   Pulse 93   Wt (!) 159.9 kg (352 lb 8.3 oz)   LMP  (LMP Unknown)   SpO2 92%   BMI 56.90 kg/m      Physical Exam  Constitutional:       General: She is not in acute distress.     Appearance: She is not ill-appearing or diaphoretic.      Comments: In wheelchair   HENT:      Nose: Nose normal.   Cardiovascular:      Rate and Rhythm: Normal rate and regular rhythm.      Pulses: Normal pulses.      Heart sounds: Normal heart sounds.   Pulmonary:      Effort: Pulmonary effort is normal. No respiratory distress.      Breath sounds: Normal breath sounds. No wheezing or rhonchi.   Skin:     General: Skin is warm and dry.      Findings: No rash.   Neurological:      Mental Status: She is alert.   Psychiatric:         Behavior: Behavior normal.            Diagnostic tests:      Echocardiogram 4/2017  1. Normal left ventricular size and systolic performance with a visually estimated ejection fraction of 60-65%.   2. No significant valvular heart disease is identified on this study.   3. Probable normal right ventricular size and systolic performance though right-sided structures are not clearly visualized on all views on this study.   4. There is mild left atrial enlargement.   5. Right ventricular systolic pressure cannot be directly estimated  from TR velocities due to insufficient tricuspid regurgitation. However, there are no indirect findings such as abnormal RV volume/geometry, altered pulmonary flow velocity profile, or leftward septal displacement to suggest moderate or severe pulmonary hypertension.      When compared to the prior real-time echocardiogram dated 29 July 2014, there has been little appreciable interval change.     Nuc Med Stress Test 06/2021:  SUMMARY:  1.  Subjectively positive regadenoson infusion.  2.  Negative regadenoson ECG for ischemia.  3.  Regadenoson nuclear imaging does not suggest any ischemia or prior scar.  4.  Preserved wall motion as mentioned above.  5.  No prior scan is available for comparison.    PFTs:     FVC  2.78 L (82%)  FEV1 2.25 L (85%)  FEV1/FVC 81  TLC 4.87 L (92%)  RV 1.71 L (84%)  DLCO 94%         Sleep study:      Sleep study done on 3/26/2015 showed decrease sleep time, AHI 1.8, per sleep physician, underestimated secondary to no stage REM sleep, significant hypoxia was note present.     PSG ( 2/3/2021) AHI 91, SpO2 sheri 78.6%, 9.2 minutes <= 88%.   AutoCPAP.     IMAGES:     CT CHEST PULMONARY EMBOLISM W CONTRAST  LOCATION: [facility]  DATE/TIME: 8/31/2021 2:17 PM   INDICATION: Dyspnea, chronic, unclear etiology.  COMPARISON: None.  FINDINGS:  ANGIOGRAM CHEST: Pulmonary arteries are normal caliber and negative for pulmonary emboli. Thoracic aorta is negative for dissection or aneurysm.  LUNGS AND PLEURA: Minimal atelectasis or scarring. Otherwise, lungs are clear. No significant airway thickening or bronchiectasis. No pleural effusion or pneumothorax.  MEDIASTINUM/AXILLAE: No adenopathy. No pericardial effusion. Small hiatus hernia.  CORONARY ARTERY CALCIFICATION: None.  UPPER ABDOMEN: Hepatic steatosis. Stable 2.5 cm left adrenal adenoma requiring no routine follow-up.  MUSCULOSKELETAL: Normal.  IMPRESSION:  1.  No findings to explain symptoms.  2.  No pulmonary embolism.  3.   Hepatic steatosis.  4.  Small hiatus hernia.     XR CHEST 2 VW  LOCATION: [facility]  DATE/TIME: 12/9/2021 10:34 AM  INDICATION: Chronic cough and left chest wall pain. History of COPD.  COMPARISON: CT chest 8/31/2021 and one view chest 9/9/2019  IMPRESSION: Heart size and vascularity are normal. Focal subpleural airspace opacities within the left mid lung and lower lobe suggesting pneumonia. No pneumothorax nor pleural effusion.  Follow-up radiograph should be suggested to ensure resolution.    CT ABDOMEN PELVIS W CONTRAST  DATE/TIME: 5/7/2022 1:16 PM  INDICATION:  Pelvic and perineal pain.  COMPARISON: CT abdomen 05/23/2013.  FINDINGS:   LOWER CHEST: Normal.  HEPATOBILIARY: No acute abnormality. A hypodense nodule suggesting hemangioma is stable in size measuring 1.8 cm posterior right hepatic dome series 3 image 27. Gallbladder appears unremarkable.  PANCREAS: Normal.  SPLEEN: No acute abnormality. Adjacent splenule is again noted.  ADRENAL GLANDS: Hypodense nodule is 2.8 cm right adrenal, previously 1.8 cm series 3 image 55. Normal left adrenal.  KIDNEYS/BLADDER: No hydronephrosis or urolithiasis. No acute abnormality. Bladder is unremarkable.  BOWEL: No obstruction. Normal appendix. No acute inflammatory change.  LYMPH NODES: Normal.  VASCULATURE: Unremarkable.  PELVIC ORGANS: Hysterectomy. No perineal abscess. No convincing acute abnormality.   MUSCULOSKELETAL: Height loss at L1, L2, and L5 vertebral bodies noted.  IMPRESSION:   1.  No acute abnormality is seen. No perineal or pelvic fluid collection identified.  2.  Larger size of a right adrenal nodule compared to 05/23/2013. This is technically indeterminate. This could be further correlated with adrenal specific CT or MRI.  3.  Height loss noted at L1, L2, and L5 vertebral bodies.

## 2024-05-02 ENCOUNTER — ANTICOAGULATION THERAPY VISIT (OUTPATIENT)
Dept: ANTICOAGULATION | Facility: CLINIC | Age: 64
End: 2024-05-02
Payer: COMMERCIAL

## 2024-05-02 ENCOUNTER — ANESTHESIA (OUTPATIENT)
Dept: SURGERY | Facility: CLINIC | Age: 64
End: 2024-05-02
Payer: COMMERCIAL

## 2024-05-02 LAB
ALBUMIN SERPL BCG-MCNC: 4 G/DL (ref 3.5–5.2)
ALP SERPL-CCNC: 67 U/L (ref 40–150)
ALT SERPL W P-5'-P-CCNC: 23 U/L (ref 0–50)
ANION GAP SERPL CALCULATED.3IONS-SCNC: 13 MMOL/L (ref 7–15)
AST SERPL W P-5'-P-CCNC: 17 U/L (ref 0–45)
ATRIAL RATE - MUSE: 79 BPM
BILIRUB SERPL-MCNC: 0.3 MG/DL
BUN SERPL-MCNC: 12.9 MG/DL (ref 8–23)
CALCIUM SERPL-MCNC: 8.9 MG/DL (ref 8.8–10.2)
CHLORIDE SERPL-SCNC: 106 MMOL/L (ref 98–107)
CREAT SERPL-MCNC: 0.81 MG/DL (ref 0.51–0.95)
DEPRECATED HCO3 PLAS-SCNC: 24 MMOL/L (ref 22–29)
DIASTOLIC BLOOD PRESSURE - MUSE: NORMAL MMHG
EGFRCR SERPLBLD CKD-EPI 2021: 81 ML/MIN/1.73M2
GLUCOSE SERPL-MCNC: 104 MG/DL (ref 70–99)
INTERPRETATION ECG - MUSE: NORMAL
P AXIS - MUSE: 60 DEGREES
POTASSIUM SERPL-SCNC: 4 MMOL/L (ref 3.4–5.3)
PR INTERVAL - MUSE: 212 MS
PROT SERPL-MCNC: 6.5 G/DL (ref 6.4–8.3)
QRS DURATION - MUSE: 90 MS
QT - MUSE: 396 MS
QTC - MUSE: 454 MS
R AXIS - MUSE: -4 DEGREES
SODIUM SERPL-SCNC: 143 MMOL/L (ref 135–145)
SYSTOLIC BLOOD PRESSURE - MUSE: NORMAL MMHG
T AXIS - MUSE: 33 DEGREES
VENTRICULAR RATE- MUSE: 79 BPM

## 2024-05-02 PROCEDURE — 93010 ELECTROCARDIOGRAM REPORT: CPT | Performed by: STUDENT IN AN ORGANIZED HEALTH CARE EDUCATION/TRAINING PROGRAM

## 2024-05-02 NOTE — PROGRESS NOTES
Pt scheduled for EGD/colonoscopy today 24. Pt had pre-op on 24. Pt had previously been advised of procedure plan. (TE 24) Per 24 encounter, home care had been advised to recheck INR 1 week after restarting warfarin on 24.    Pre-Procedure:  Hold warfarin for 4 days, until after procedure startin2024   No Bridge     Post-Procedure:  Resume warfarin dose if okay with provider doing procedure on night of procedure, 2024 PM: 20mg, 2024 15mg  Recheck INR ~ 7 days after resuming warfarin

## 2024-05-03 ENCOUNTER — MEDICAL CORRESPONDENCE (OUTPATIENT)
Dept: HEALTH INFORMATION MANAGEMENT | Facility: CLINIC | Age: 64
End: 2024-05-03
Payer: COMMERCIAL

## 2024-05-03 ENCOUNTER — TELEPHONE (OUTPATIENT)
Dept: ANTICOAGULATION | Facility: CLINIC | Age: 64
End: 2024-05-03
Payer: COMMERCIAL

## 2024-05-03 DIAGNOSIS — Z86.711 HISTORY OF PULMONARY EMBOLISM: ICD-10-CM

## 2024-05-03 DIAGNOSIS — T63.441S: ICD-10-CM

## 2024-05-03 DIAGNOSIS — Z79.01 LONG TERM (CURRENT) USE OF ANTICOAGULANTS: ICD-10-CM

## 2024-05-03 DIAGNOSIS — I26.99 PULMONARY EMBOLISM WITH INFARCTION (H): Primary | ICD-10-CM

## 2024-05-03 RX ORDER — EPINEPHRINE 0.3 MG/.3ML
0.3 INJECTION SUBCUTANEOUS PRN
Qty: 2 EACH | Refills: 1 | Status: SHIPPED | OUTPATIENT
Start: 2024-05-03

## 2024-05-03 NOTE — RESULT ENCOUNTER NOTE
Patient cleared for surgical procedure.  Patient likely does not have anterior infarct but is likely due to low lead placement voltage

## 2024-05-03 NOTE — TELEPHONE ENCOUNTER
Received a message from home care nurse that patient was supposed to have Colonoscopy /EGD Procedure on 5/2 but rescheduled it for 5/7/24      Patient had an INR done yesterday but no call made to the patient. Per Home Care nurse patient held doses on 4/28 to 5/1 and did not take warfarin yesterday due to she did not get a call from [facility]    Will reconsult [facility] for updated plan for Colonoscopy/EGD  now scheduled for 5/7/24 and will call back   With updated plan.

## 2024-05-03 NOTE — TELEPHONE ENCOUNTER
[facility] recommendation:    Pre procedure: continue holding warfarin doses until 5/6/24    Post Procedure: If given okay by proceduralist take booster doses on 5/7 20 mg, 5/8 20 mg then resume current warfarin doses on 10 mg Sun, Tues, Thurs and 7.5 mg all other days.  Recheck INR one week after resuming warfarin on 5/14/24    Above instructions relayed to [facility] nurse Opal and she will relay the updated instructions to Valentina Olmedo.

## 2024-05-03 NOTE — TELEPHONE ENCOUNTER
Medication Request  Medication name: EPINEPHrine (ANY BX GENERIC EQUIV) 0.3 MG/0.3ML injection 2-pack   Requested Pharmacy:  [facility]  When was patient last seen for this?:  3/27/24  Patient offered appointment:  N/A pharmacy sent request  Okay to leave a detailed message: no

## 2024-05-03 NOTE — RESULT ENCOUNTER NOTE
Your comprehensive metabolic panel shows your liver, kidney function, your body's fluid balance, and electrolytes were normal.    Your CBC (complete blood count) which looks at your hemoglobin and other blood cell types looks good- no concerns for anemia or infection.

## 2024-05-06 ASSESSMENT — ENCOUNTER SYMPTOMS: SEIZURES: 1

## 2024-05-06 ASSESSMENT — COPD QUESTIONNAIRES: COPD: 1

## 2024-05-06 ASSESSMENT — LIFESTYLE VARIABLES: TOBACCO_USE: 1

## 2024-05-06 NOTE — ANESTHESIA PREPROCEDURE EVALUATION
Anesthesia Pre-Procedure Evaluation    Patient: Valentina Olmedo   MRN: [id number] : 1960        Procedure : Procedure(s):  ESOPHAGOGASTRODUODENOSCOPY  WITH COLONOSCOPY          Past Medical History:   Diagnosis Date    Adrenal mass (H24) 10/12/2015    Chen Null MD  They were incidentally discovered on the CAT scan of the abdomen from 2011 which was done for evaluation of kidney stones. F/up CT of the abdomen done on 12 showed a stable 5 mm nodular opacity in the right lower lung lobe, adjacent to the diagram. Bilateral adrenal masses average density measured less than 0 Hounsfield units, consistent with benign etio    Anxiety     Anxiety     Arthritis     Borderline personality disorder (H)     Cancer (H)     COPD (chronic obstructive pulmonary disease) (H)     Depression     Depressive disorder     History of COVID-19     Hyperlipidemia     Hypertension     Hypertension     Hyponatremia     Hypothyroidism     Malignant neoplasm of thyroid gland (H)     Chen Null MD  She had R hemithyroidectomy for a right neck tumor in . According to the patient, the tumor was benign. She is not sure whether or not the tumor belonged to the thyroid gland. The surgery was done here at the [facility]. In 2011, she was diagnosed with kidney stones. She was found to have an elevated calcium level of 11.7, with a PTH level of 368. Stone an    Primary hyperparathyroidism (H24)           PTSD (post-traumatic stress disorder)     Pulmonary embolism with infarction (H) 2021    Recurrent otitis media     Seizure disorder (H)     Sleep apnea     cpap at [facility]    Stroke (H)     Tobacco abuse     Vertigo       Past Surgical History:   Procedure Laterality Date    ABDOMEN SURGERY      ADRENALECTOMY Left     BRAIN SURGERY      CRANIOTOMY FOR TEMPORAL LOBECTOMY Right     x3 for seizure    DILATION AND CURETTAGE      HEAD & NECK SURGERY      HYSTERECTOMY, PAP NO LONGER INDICATED  N/A     LITHOTRIPSY      PARATHYROID GLAND SURGERY      resection of adenoma    REFRACTIVE SURGERY Bilateral     RELEASE CARPAL TUNNEL Bilateral     TONSILLECTOMY & ADENOIDECTOMY      TOTAL THYROIDECTOMY      TOTAL VAGINAL HYSTERECTOMY      39 years old. Benign      Allergies   Allergen Reactions    Aspirin Shortness Of Breath    Bees Anaphylaxis    Lamictal [Lamotrigine] Dizziness    Latex Itching    Phenobarbital      aggression    Vistaril [Hydroxyzine] Other (See Comments)     Sluggish, unable to wake up    Gabapentin Rash      Social History     Tobacco Use    Smoking status: Former     Types: Cigarettes     Passive exposure: Never    Smokeless tobacco: Never    Tobacco comments:     Quit about 5 months ago on Chantix.     Substance Use Topics    Alcohol use: No     Comment: None.      Wt Readings from Last 1 Encounters:   04/30/24 (!) 159.9 kg (352 lb 8.3 oz)        Anesthesia Evaluation            ROS/MED HX  ENT/Pulmonary:     (+) sleep apnea, uses CPAP,              tobacco use,          COPD,              Neurologic:     (+)       seizures,   CVA,                      Cardiovascular:     (+) Dyslipidemia hypertension- -   -  - -                                      METS/Exercise Tolerance:     Hematologic:       Musculoskeletal:   (+)  arthritis,             GI/Hepatic:     (+) GERD,                   Renal/Genitourinary:       Endo:     (+)          thyroid problem,     Obesity,       Psychiatric/Substance Use:     (+) psychiatric history depression, anxiety and other (comment)       Infectious Disease:       Malignancy:       Other:               OUTSIDE LABS:  CBC:   Lab Results   Component Value Date    WBC 5.7 05/01/2024    WBC 6.3 04/17/2024    HGB 12.6 05/01/2024    HGB 13.1 04/17/2024    HCT 39.0 05/01/2024    HCT 39.2 04/17/2024     05/01/2024     04/17/2024     BMP:   Lab Results   Component Value Date     05/01/2024     01/30/2024    POTASSIUM 4.0 05/01/2024     "POTASSIUM 4.0 01/30/2024    CHLORIDE 106 05/01/2024    CHLORIDE 113 (H) 01/30/2024    CO2 24 05/01/2024    CO2 20 (L) 01/30/2024    BUN 12.9 05/01/2024    BUN 14.1 01/30/2024    CR 0.81 05/01/2024    CR 0.86 04/17/2024     (H) 05/01/2024     (H) 01/30/2024     COAGS:   Lab Results   Component Value Date    PTT 37 12/10/2019    INR 1.17 (H) 05/01/2024     POC: No results found for: \"BGM\", \"HCG\", \"HCGS\"  HEPATIC:   Lab Results   Component Value Date    ALBUMIN 4.0 05/01/2024    PROTTOTAL 6.5 05/01/2024    ALT 23 05/01/2024    AST 17 05/01/2024    ALKPHOS 67 05/01/2024    BILITOTAL 0.3 05/01/2024    PINKY 21 12/10/2019     OTHER:   Lab Results   Component Value Date    PH 7.38 10/12/2021    A1C 5.2 01/30/2024    HILARY 8.9 05/01/2024    PHOS 2.6 08/20/2021    MAG 1.8 08/20/2021    TSH 0.26 (L) 01/30/2024    T4 1.30 01/30/2024    SED 19 04/17/2024       Anesthesia Plan       NPO Status:  NPO Appropriate    Anesthesia Type: MAC.   Induction: Propofol.           Consents    Anesthesia Plan(s) and associated risks, benefits, and realistic alternatives discussed. Questions answered and patient/representative(s) expressed understanding.     - Discussed:     - Discussed with:  Patient      - Extended Intubation/Ventilatory Support Discussed: No.      - Patient is DNR/DNI Status: No     Use of blood products discussed: No .     Postoperative Care       PONV prophylaxis: Ondansetron (or other 5HT-3)     Comments:    Other Comments: *Patient not yet seen. Pre-charting only. Physical exam to be updated and Anesthesiologist Ready icon to be completed after seeing patient.*    Recent Covid 19 infection (4/21/24). Patient feels back to baseline.   Discussed possibility of intra-op awareness and need to convert to GA.     Patient history and physical exam reviewed. NPO status appropriate. Focused physical and history performed, pre-op evaluation updated. RBA discussed re: anesthesia and patients questions answered.        " "          Pema Singh MD    I have reviewed the pertinent notes and labs in the chart from the past 30 days and (re)examined the patient.  Any updates or changes from those notes are reflected in this note.            # Coagulation Defect: INR = 1.17 (Ref range: 0.85 - 1.15) and/or PTT = N/A, will monitor for bleeding   # Severe Obesity: Estimated body mass index is 56.9 kg/m  as calculated from the following:    Height as of 4/25/24: 1.676 m (5' 6\").    Weight as of 4/30/24: 159.9 kg (352 lb 8.3 oz).      "

## 2024-05-07 ENCOUNTER — MEDICAL CORRESPONDENCE (OUTPATIENT)
Dept: HEALTH INFORMATION MANAGEMENT | Facility: CLINIC | Age: 64
End: 2024-05-07

## 2024-05-07 ENCOUNTER — HOSPITAL ENCOUNTER (OUTPATIENT)
Facility: CLINIC | Age: 64
Discharge: HOME | End: 2024-05-07
Attending: INTERNAL MEDICINE | Admitting: INTERNAL MEDICINE
Payer: COMMERCIAL

## 2024-05-07 VITALS
RESPIRATION RATE: 16 BRPM | HEART RATE: 75 BPM | TEMPERATURE: 97.1 F | OXYGEN SATURATION: 94 % | SYSTOLIC BLOOD PRESSURE: 146 MMHG | DIASTOLIC BLOOD PRESSURE: 84 MMHG

## 2024-05-07 LAB
COLONOSCOPY: NORMAL
INR PPP: 1.01 (ref 0.85–1.15)
UPPER GI ENDOSCOPY: NORMAL

## 2024-05-07 PROCEDURE — 258N000003 HC RX IP 258 OP 636: Performed by: ANESTHESIOLOGY

## 2024-05-07 PROCEDURE — 250N000011 HC RX IP 250 OP 636: Performed by: NURSE ANESTHETIST, CERTIFIED REGISTERED

## 2024-05-07 PROCEDURE — 370N000017 HC ANESTHESIA TECHNICAL FEE, PER MIN: Performed by: INTERNAL MEDICINE

## 2024-05-07 PROCEDURE — 88305 TISSUE EXAM BY PATHOLOGIST: CPT | Mod: TC | Performed by: INTERNAL MEDICINE

## 2024-05-07 PROCEDURE — 999N000141 HC STATISTIC PRE-PROCEDURE NURSING ASSESSMENT: Performed by: INTERNAL MEDICINE

## 2024-05-07 PROCEDURE — 36415 COLL VENOUS BLD VENIPUNCTURE: CPT | Performed by: INTERNAL MEDICINE

## 2024-05-07 PROCEDURE — 710N000012 HC RECOVERY PHASE 2, PER MINUTE: Performed by: INTERNAL MEDICINE

## 2024-05-07 PROCEDURE — 85610 PROTHROMBIN TIME: CPT | Performed by: INTERNAL MEDICINE

## 2024-05-07 PROCEDURE — 360N000075 HC SURGERY LEVEL 2, PER MIN: Performed by: INTERNAL MEDICINE

## 2024-05-07 PROCEDURE — 272N000001 HC OR GENERAL SUPPLY STERILE: Performed by: INTERNAL MEDICINE

## 2024-05-07 RX ORDER — DEXAMETHASONE SODIUM PHOSPHATE 4 MG/ML
4 INJECTION, SOLUTION INTRA-ARTICULAR; INTRALESIONAL; INTRAMUSCULAR; INTRAVENOUS; SOFT TISSUE
Status: DISCONTINUED | OUTPATIENT
Start: 2024-05-07 | End: 2024-05-09 | Stop reason: HOSPADM

## 2024-05-07 RX ORDER — ACETAMINOPHEN 325 MG/1
975 TABLET ORAL EVERY 4 HOURS PRN
Status: DISCONTINUED | OUTPATIENT
Start: 2024-05-07 | End: 2024-05-09 | Stop reason: HOSPADM

## 2024-05-07 RX ORDER — NALOXONE HYDROCHLORIDE 0.4 MG/ML
0.4 INJECTION, SOLUTION INTRAMUSCULAR; INTRAVENOUS; SUBCUTANEOUS
Status: DISCONTINUED | OUTPATIENT
Start: 2024-05-07 | End: 2024-05-09 | Stop reason: HOSPADM

## 2024-05-07 RX ORDER — NALOXONE HYDROCHLORIDE 0.4 MG/ML
0.2 INJECTION, SOLUTION INTRAMUSCULAR; INTRAVENOUS; SUBCUTANEOUS
Status: DISCONTINUED | OUTPATIENT
Start: 2024-05-07 | End: 2024-05-09 | Stop reason: HOSPADM

## 2024-05-07 RX ORDER — ONDANSETRON 2 MG/ML
4 INJECTION INTRAMUSCULAR; INTRAVENOUS EVERY 30 MIN PRN
Status: DISCONTINUED | OUTPATIENT
Start: 2024-05-07 | End: 2024-05-09 | Stop reason: HOSPADM

## 2024-05-07 RX ORDER — LIDOCAINE 40 MG/G
CREAM TOPICAL
Status: DISCONTINUED | OUTPATIENT
Start: 2024-05-07 | End: 2024-05-09 | Stop reason: HOSPADM

## 2024-05-07 RX ORDER — NALOXONE HYDROCHLORIDE 0.4 MG/ML
0.1 INJECTION, SOLUTION INTRAMUSCULAR; INTRAVENOUS; SUBCUTANEOUS
Status: DISCONTINUED | OUTPATIENT
Start: 2024-05-07 | End: 2024-05-09 | Stop reason: HOSPADM

## 2024-05-07 RX ORDER — ONDANSETRON 4 MG/1
4 TABLET, ORALLY DISINTEGRATING ORAL EVERY 30 MIN PRN
Status: DISCONTINUED | OUTPATIENT
Start: 2024-05-07 | End: 2024-05-09 | Stop reason: HOSPADM

## 2024-05-07 RX ORDER — FLUMAZENIL 0.1 MG/ML
0.2 INJECTION, SOLUTION INTRAVENOUS
Status: DISCONTINUED | OUTPATIENT
Start: 2024-05-07 | End: 2024-05-08 | Stop reason: HOSPADM

## 2024-05-07 RX ORDER — OXYCODONE HYDROCHLORIDE 5 MG/1
10 TABLET ORAL EVERY 4 HOURS PRN
Status: DISCONTINUED | OUTPATIENT
Start: 2024-05-07 | End: 2024-05-09 | Stop reason: HOSPADM

## 2024-05-07 RX ORDER — OXYCODONE HYDROCHLORIDE 5 MG/1
5 TABLET ORAL EVERY 4 HOURS PRN
Status: DISCONTINUED | OUTPATIENT
Start: 2024-05-07 | End: 2024-05-09 | Stop reason: HOSPADM

## 2024-05-07 RX ORDER — PROPOFOL 10 MG/ML
INJECTION, EMULSION INTRAVENOUS CONTINUOUS PRN
Status: DISCONTINUED | OUTPATIENT
Start: 2024-05-07 | End: 2024-05-07

## 2024-05-07 RX ORDER — SODIUM CHLORIDE, SODIUM LACTATE, POTASSIUM CHLORIDE, CALCIUM CHLORIDE 600; 310; 30; 20 MG/100ML; MG/100ML; MG/100ML; MG/100ML
INJECTION, SOLUTION INTRAVENOUS CONTINUOUS
Status: DISCONTINUED | OUTPATIENT
Start: 2024-05-07 | End: 2024-05-09 | Stop reason: HOSPADM

## 2024-05-07 RX ORDER — ONDANSETRON 4 MG/1
4 TABLET, ORALLY DISINTEGRATING ORAL EVERY 6 HOURS PRN
Status: DISCONTINUED | OUTPATIENT
Start: 2024-05-07 | End: 2024-05-09 | Stop reason: HOSPADM

## 2024-05-07 RX ORDER — ONDANSETRON 2 MG/ML
4 INJECTION INTRAMUSCULAR; INTRAVENOUS EVERY 6 HOURS PRN
Status: DISCONTINUED | OUTPATIENT
Start: 2024-05-07 | End: 2024-05-09 | Stop reason: HOSPADM

## 2024-05-07 RX ORDER — FENTANYL CITRATE 50 UG/ML
25 INJECTION, SOLUTION INTRAMUSCULAR; INTRAVENOUS
Status: DISCONTINUED | OUTPATIENT
Start: 2024-05-07 | End: 2024-05-09 | Stop reason: HOSPADM

## 2024-05-07 RX ORDER — PROCHLORPERAZINE MALEATE 10 MG
10 TABLET ORAL EVERY 6 HOURS PRN
Status: DISCONTINUED | OUTPATIENT
Start: 2024-05-07 | End: 2024-05-09 | Stop reason: HOSPADM

## 2024-05-07 RX ADMIN — PROPOFOL 150 MCG/KG/MIN: 10 INJECTION, EMULSION INTRAVENOUS at 11:53

## 2024-05-07 RX ADMIN — SODIUM CHLORIDE, POTASSIUM CHLORIDE, SODIUM LACTATE AND CALCIUM CHLORIDE: 600; 310; 30; 20 INJECTION, SOLUTION INTRAVENOUS at 11:34

## 2024-05-07 ASSESSMENT — ENCOUNTER SYMPTOMS: SEIZURES: 1

## 2024-05-07 ASSESSMENT — ACTIVITIES OF DAILY LIVING (ADL)
ADLS_ACUITY_SCORE: 35
ADLS_ACUITY_SCORE: 33
ADLS_ACUITY_SCORE: 35
ADLS_ACUITY_SCORE: 35
ADLS_ACUITY_SCORE: 33
ADLS_ACUITY_SCORE: 35

## 2024-05-07 ASSESSMENT — COPD QUESTIONNAIRES: COPD: 1

## 2024-05-07 NOTE — H&P
I have reviewed the  H&P and examined the patient. There are no significant changes.  NADEGE FIGUEROA MD   [facility]

## 2024-05-07 NOTE — ANESTHESIA CARE TRANSFER NOTE
Patient: Valentina Olmedo    Procedure: Procedure(s):  ESOPHAGOGASTRODUODENOSCOPY with esophageal dilation and biopsies  WITH COLONOSCOPY with polypectomies       Diagnosis: Screening for malignant neoplasm of colon [Z12.11]  Chronic gastroesophageal reflux disease [K21.9]  Diagnosis Additional Information: No value filed.    Anesthesia Type:   MAC     Note:    Oropharynx: oropharynx clear of all foreign objects  Level of Consciousness: drowsy  Oxygen Supplementation: face mask  Level of Supplemental Oxygen (L/min / FiO2): 6  Independent Airway: airway patency satisfactory and stable  Dentition: dentition unchanged  Vital Signs Stable: post-procedure vital signs reviewed and stable  Report to RN Given: handoff report given  Patient transferred to: Phase II    Handoff Report: Identifed the Patient, Identified the Reponsible Provider, Reviewed the pertinent medical history, Discussed the surgical course, Reviewed Intra-OP anesthesia mangement and issues during anesthesia, Set expectations for post-procedure period and Allowed opportunity for questions and acknowledgement of understanding      Vitals:  Vitals Value Taken Time   /82 05/07/24 1314   Temp 35.8  C (96.4  F) 05/07/24 1314   Pulse 77    Resp 14 05/07/24 1314   SpO2 99 % 05/07/24 1314       Electronically Signed By: SHAHRZAD Ly CRNA  May 7, 2024  1:17 PM

## 2024-05-07 NOTE — ANESTHESIA PREPROCEDURE EVALUATION
Anesthesia Pre-Procedure Evaluation    Patient: Valentina Olmedo   MRN: [id number] : 1960        Procedure : Procedure(s):  ESOPHAGOGASTRODUODENOSCOPY  WITH COLONOSCOPY          Past Medical History:   Diagnosis Date    Adrenal mass (H24) 10/12/2015    Chen Null MD  They were incidentally discovered on the CAT scan of the abdomen from 2011 which was done for evaluation of kidney stones. F/up CT of the abdomen done on 12 showed a stable 5 mm nodular opacity in the right lower lung lobe, adjacent to the diagram. Bilateral adrenal masses average density measured less than 0 Hounsfield units, consistent with benign etio    Anxiety     Anxiety     Arthritis     Borderline personality disorder (H)     Cancer (H)     COPD (chronic obstructive pulmonary disease) (H)     Depression     Depressive disorder     History of COVID-19     Hyperlipidemia     Hypertension     Hypertension     Hyponatremia     Hypothyroidism     Malignant neoplasm of thyroid gland (H)     Chen Null MD  She had R hemithyroidectomy for a right neck tumor in . According to the patient, the tumor was benign. She is not sure whether or not the tumor belonged to the thyroid gland. The surgery was done here at the [facility]. In 2011, she was diagnosed with kidney stones. She was found to have an elevated calcium level of 11.7, with a PTH level of 368. Stone an    Primary hyperparathyroidism (H24)           PTSD (post-traumatic stress disorder)     Pulmonary embolism with infarction (H) 2021    Recurrent otitis media     Seizure disorder (H)     Sleep apnea     cpap at [facility]    Stroke (H)     Tobacco abuse     Vertigo       Past Surgical History:   Procedure Laterality Date    ABDOMEN SURGERY      ADRENALECTOMY Left     BRAIN SURGERY      CRANIOTOMY FOR TEMPORAL LOBECTOMY Right     x3 for seizure    DILATION AND CURETTAGE      HEAD & NECK SURGERY      HYSTERECTOMY, PAP NO LONGER INDICATED  N/A     LITHOTRIPSY      PARATHYROID GLAND SURGERY      resection of adenoma    REFRACTIVE SURGERY Bilateral     RELEASE CARPAL TUNNEL Bilateral     TONSILLECTOMY & ADENOIDECTOMY      TOTAL THYROIDECTOMY      TOTAL VAGINAL HYSTERECTOMY      39 years old. Benign      Allergies   Allergen Reactions    Aspirin Shortness Of Breath    Bees Anaphylaxis    Lamictal [Lamotrigine] Dizziness    Latex Itching    Phenobarbital      aggression    Vistaril [Hydroxyzine] Other (See Comments)     Sluggish, unable to wake up    Gabapentin Rash      Social History     Tobacco Use    Smoking status: Former     Types: Cigarettes     Passive exposure: Never    Smokeless tobacco: Never    Tobacco comments:     Quit about 5 months ago on Chantix.     Substance Use Topics    Alcohol use: No     Comment: None.      Wt Readings from Last 1 Encounters:   04/30/24 (!) 159.9 kg (352 lb 8.3 oz)        Anesthesia Evaluation            ROS/MED HX  ENT/Pulmonary:     (+) sleep apnea, uses CPAP,                        COPD,              Neurologic:     (+)       seizures,   CVA,                      Cardiovascular:     (+) Dyslipidemia hypertension- -   -  - -                                      METS/Exercise Tolerance:     Hematologic:       Musculoskeletal:   (+)  arthritis,             GI/Hepatic:     (+) GERD, Asymptomatic on medication,                  Renal/Genitourinary:       Endo:     (+)          thyroid problem, cancer,    Obesity,       Psychiatric/Substance Use:     (+) psychiatric history anxiety, other (comment) and depression       Infectious Disease:       Malignancy:       Other:            Physical Exam    Airway  airway exam normal      Mallampati: II    Neck ROM: full   Mouth opening: > 3 cm    Respiratory Devices and Support         Dental  no notable dental history         Cardiovascular   cardiovascular exam normal       Rhythm and rate: regular and normal     Pulmonary   pulmonary exam normal        breath sounds clear to  "auscultation           OUTSIDE LABS:  CBC:   Lab Results   Component Value Date    WBC 5.7 05/01/2024    WBC 6.3 04/17/2024    HGB 12.6 05/01/2024    HGB 13.1 04/17/2024    HCT 39.0 05/01/2024    HCT 39.2 04/17/2024     05/01/2024     04/17/2024     BMP:   Lab Results   Component Value Date     05/01/2024     01/30/2024    POTASSIUM 4.0 05/01/2024    POTASSIUM 4.0 01/30/2024    CHLORIDE 106 05/01/2024    CHLORIDE 113 (H) 01/30/2024    CO2 24 05/01/2024    CO2 20 (L) 01/30/2024    BUN 12.9 05/01/2024    BUN 14.1 01/30/2024    CR 0.81 05/01/2024    CR 0.86 04/17/2024     (H) 05/01/2024     (H) 01/30/2024     COAGS:   Lab Results   Component Value Date    PTT 37 12/10/2019    INR 1.17 (H) 05/01/2024     POC: No results found for: \"BGM\", \"HCG\", \"HCGS\"  HEPATIC:   Lab Results   Component Value Date    ALBUMIN 4.0 05/01/2024    PROTTOTAL 6.5 05/01/2024    ALT 23 05/01/2024    AST 17 05/01/2024    ALKPHOS 67 05/01/2024    BILITOTAL 0.3 05/01/2024    PINKY 21 12/10/2019     OTHER:   Lab Results   Component Value Date    PH 7.38 10/12/2021    A1C 5.2 01/30/2024    HILARY 8.9 05/01/2024    PHOS 2.6 08/20/2021    MAG 1.8 08/20/2021    TSH 0.26 (L) 01/30/2024    T4 1.30 01/30/2024    SED 19 04/17/2024       Anesthesia Plan    ASA Status:  4    NPO Status:  NPO Appropriate    Anesthesia Type: MAC.   Induction: Propofol.           Consents    Anesthesia Plan(s) and associated risks, benefits, and realistic alternatives discussed. Questions answered and patient/representative(s) expressed understanding.     - Discussed:     - Discussed with:  Patient      - Extended Intubation/Ventilatory Support Discussed: No.      - Patient is DNR/DNI Status: No     Use of blood products discussed: No .     Postoperative Care       PONV prophylaxis: Ondansetron (or other 5HT-3)     Comments:    Other Comments: Discussed possibility of intra-op awareness and need to convert to GA.     Patient history and " "physical exam reviewed. NPO status appropriate. Focused physical and history performed, pre-op evaluation updated. RBA discussed re: anesthesia and patients questions answered.                  Pema Singh MD    I have reviewed the pertinent notes and labs in the chart from the past 30 days and (re)examined the patient.  Any updates or changes from those notes are reflected in this note.            # Drug Induced Coagulation Defect: home medication list includes an anticoagulant medication   # Severe Obesity: Estimated body mass index is 56.9 kg/m  as calculated from the following:    Height as of 4/25/24: 1.676 m (5' 6\").    Weight as of 4/30/24: 159.9 kg (352 lb 8.3 oz).      "

## 2024-05-07 NOTE — ANESTHESIA POSTPROCEDURE EVALUATION
Patient: Valentina Olmedo    Procedure: Procedure(s):  ESOPHAGOGASTRODUODENOSCOPY with esophageal dilation and biopsies  WITH COLONOSCOPY with polypectomies       Anesthesia Type:  MAC    Note:  Disposition: Outpatient   Postop Pain Control: Uneventful            Sign Out: Well controlled pain   PONV: No   Neuro/Psych: Uneventful            Sign Out: Acceptable/Baseline neuro status   Airway/Respiratory: Uneventful            Sign Out: Acceptable/Baseline resp. status   CV/Hemodynamics: Uneventful            Sign Out: Acceptable CV status; No obvious hypovolemia; No obvious fluid overload   Other NRE: NONE   DID A NON-ROUTINE EVENT OCCUR?            Last vitals:  Vitals Value Taken Time   /84 05/07/24 1330   Temp 36.2  C (97.1  F) 05/07/24 1350   Pulse     Resp 16 05/07/24 1330   SpO2 94 % 05/07/24 1330       Electronically Signed By: Pema Singh MD  May 7, 2024  2:57 PM

## 2024-05-09 ENCOUNTER — DOCUMENTATION ONLY (OUTPATIENT)
Dept: ANTICOAGULATION | Facility: CLINIC | Age: 64
End: 2024-05-09
Payer: COMMERCIAL

## 2024-05-09 LAB
PATH REPORT.COMMENTS IMP SPEC: NORMAL
PATH REPORT.COMMENTS IMP SPEC: NORMAL
PATH REPORT.FINAL DX SPEC: NORMAL
PATH REPORT.GROSS SPEC: NORMAL
PATH REPORT.MICROSCOPIC SPEC OTHER STN: NORMAL
PATH REPORT.RELEVANT HX SPEC: NORMAL
PHOTO IMAGE: NORMAL

## 2024-05-09 PROCEDURE — 88305 TISSUE EXAM BY PATHOLOGIST: CPT | Mod: 26 | Performed by: PATHOLOGY

## 2024-05-09 ASSESSMENT — ACTIVITIES OF DAILY LIVING (ADL)
ADLS_ACUITY_SCORE: 35

## 2024-05-09 NOTE — PROGRESS NOTES
ANTICOAGULATION  MANAGEMENT: Discharge Review    Valentina Olmedo chart reviewed for anticoagulation continuity of care    Outpatient surgery/procedure on 5/7 for EGD.    Discharge disposition: Home    Results:    Recent labs: (last 7 days)     05/07/24  1101   INR 1.01     Anticoagulation inpatient management:     not applicable     Anticoagulation discharge instructions:     Warfarin dosing:  Booster doses, then resume MD   Bridging: No   INR goal change: No      Medication changes affecting anticoagulation: No    Additional factors affecting anticoagulation: No     PLAN     No adjustment to anticoagulation plan needed    Patient not contacted    No adjustment to Anticoagulation Calendar was required    Rhonda Amaral RN

## 2024-05-10 ENCOUNTER — TELEPHONE (OUTPATIENT)
Dept: FAMILY MEDICINE | Facility: CLINIC | Age: 64
End: 2024-05-10
Payer: COMMERCIAL

## 2024-05-10 DIAGNOSIS — K21.00 GASTROESOPHAGEAL REFLUX DISEASE WITH ESOPHAGITIS WITHOUT HEMORRHAGE: ICD-10-CM

## 2024-05-10 DIAGNOSIS — F17.210 CIGARETTE NICOTINE DEPENDENCE: ICD-10-CM

## 2024-05-10 NOTE — TELEPHONE ENCOUNTER
Order/Referral Request    Who is requesting: Patient    Orders being requested: Referral / orders / prior authorization for a new wheelchair, would like this covered by her insurance     Reason service is needed/diagnosis: coverage     When are orders needed by: as soon    Has this been discussed with Provider: Yes    Does patient have a preference on a Group/Provider/Facility? [facility]    Does patient have an appointment scheduled?: No    Where to send orders:  Call to discuss     Could we send this information to you in [facility] or would you prefer to receive a phone call?:   Patient would prefer a phone call   Okay to leave a detailed message?: Yes at Cell number on file:    Telephone Information:   Mobile [phone]

## 2024-05-13 ENCOUNTER — TRANSFERRED RECORDS (OUTPATIENT)
Dept: HEALTH INFORMATION MANAGEMENT | Facility: CLINIC | Age: 64
End: 2024-05-13

## 2024-05-13 NOTE — PROGRESS NOTES
Assessment:   Valentina Olmedo is a 64 y.o. female with past medical history significant for epilepsy, migraine, polyneuropathy, obstructive sleep apnea, COPD, history of PE, obesity, hyperlipidemia, hypothyroidism, hypertension, osteoporosis (on Prolia) , on warfarin, chronic kidney disease stage III, tobacco abuse, schizoaffective disorder, depression, PTSD, history of thyroid cancer, history of melanoma who presents today for follow-up regarding chronic neck pain with radiation into the right upper extremity with associated numbness, tingling, weakness.  Patient had a CT cervical spine at [facility] February 7, 2023 which showed moderate to severe spinal canal stenosis C4-5, C5-6, and C6-7 and moderate to severe spinal canal stenosis, right at C3-4.  There is also multilevel high-grade foraminal stenosis.  At C4-5 there is a 4 mm calcified lesion in the dorsal spinal canal which is new from 2018, possibly representing a densely mineralized meningioma.  The calcified lesion is not visualized on an MRI cervical spine.  The MRI of the cervical spine showed moderate to severe spinal canal stenosis C5-6 and C6-7 and multilevel high-grade foraminal stenosis.  Patient is following up after a C7-T1 interlaminar epidural steroid injection April 8, 2024 which provided 80% relief of pain.  She complains of left greater than right axial neck pain likely related to cervical facet arthropathy.  -Patient saw Dr. Jacobsen July 6, 2023 regarding her cervical spine findings.  She recommended weight loss prior to consideration of elective spinal surgery with a goal BMI of less than 40.           Plan:     A shared decision making plan was used.  The patient's values and choices were respected.  The following represents what was discussed and decided upon by the physician assistant and the patient.      1.  DIAGNOSTIC TESTS:  - I reviewed the CT thoracic spine from March 2024.  - I reviewed the lumbar spine x-rays from March  2024.  -I reviewed the  CT cervical spine from [facility] from February 7, 2023.  - I reviewed the MRI cervical spine with and without contrast from [facility] from July 2023.  - We discussed this patient's case at spine rounds in July 2023.  Recommendation is for a repeat CT cervical spine in 1 year to monitor the densely calcified lesion.  This is scheduled for July 24, 2024.  - Also reviewed an MRI lumbar spine from 2020 which showed the L1 compression deformity.  Also reviewed a report of a CT abdomen pelvis from 2019 which described an L1 compression deformity.      2.  PHYSICAL THERAPY: Patient is currently physical therapy.  Encouraged to continue with physical therapy and the home exercises.    3.  MEDICATIONS: No changes are made to the patient's medications.  Patient sees the pain clinic.  - Patient can continue Tylenol as needed.  - Patient takes pregabalin 50 mg 3 times daily.  - Patient takes duloxetine 120 mg daily for mood.  - Patient cannot take NSAIDs since she is on warfarin.    4.  INTERVENTIONS:  - We discussed medial branch blocks as a workup for radiofrequency ablation today.  We decided that she would wait for her updated CT cervical spine first.  If the calcified lesion has changed, she would need to follow-up with Dr. Jacobsen.  If it is stable, we could consider medial branch blocks.  Based on her exam today I would recommend left-sided medial branch blocks in the mid cervical region.  - As long as patient has at least 50% relief of pain for at least 3 months we can also repeat the repeat C7-T1 interlaminar epidural steroid injection.    5.  PATIENT EDUCATION: Patient is in agreement the above plan.  All questions were answered.    6.  FOLLOW-UP: My nurse will call the patient with the results of her follow-up CT scan.  If the lesion has changed I will refer her back to Dr. Jacobsen.  Otherwise, I recommend that the patient return to the clinic so that we can discuss medial branch  blocks/RFA.    Subjective:     Valentina Olmedo is a 64 year old female who presents today for follow-up regarding chronic neck pain and back pain.  Patient is following up after a C7-T1 interlaminar epidural steroid injection April 8, 2024.  Patient reports 80% improvement in pain.  She reports the pain in her neck and both arms is improved.  She still feels some stiffness in the neck.  Typically this is equal bilaterally but lately it has been worse on the left.  She rates her pain today as a 4 out of 10.  At its worst it is a 10 out of 10.  At its best it is a 1 out of 10.  She denies any aggravating or alleviating factors to the pain.  She denies any new symptoms since she was last seen.      Treatment to date:  - Physical therapy off-and-on for many years.  She went to 1 session of physical therapy for chronic pain April 2, 2022.  - Outpatient physical therapy was August 2022.  - She has had home care PT and OT 2023.    - Chiropractic treatment was helpful in the past,  - C7-T1 interlaminar epidural steroid injection April 8, 2024 with 80% relief  - C7-T1 interlaminar epidural steroid injection November 9, 2023 with 70% relief of pain lasting about 3 months  - No spine surgeries  - Tylenol   - Patient takes duloxetine 120 mg daily for mood  - Tizanidine 4 mg 3 times daily  -Gabapentin caused a rash  - Pregabalin  - Prednisone    Review of systems:  Positive for numbness/tingling, weakness, headache, trip/stumble/falls,  difficulty with hand skills.  Negative for difficulty swallowing, loss of bowel/bladder control, inability to urinate, pain much worse at night, fevers, unintentional weight loss.  Objective:   CONSTITUTIONAL:  Vital signs as above.  No acute distress.  The patient is well nourished and well groomed.  Patient is observed sitting in a wheelchair.  PSYCHIATRIC:  The patient is awake, alert, oriented to person, place and time.  The patient is answering questions appropriately with clear speech.   Normal affect.  HEENT: Normocephalic, atraumatic.  Sclera clear.    SKIN: Exposed skin is clean, dry, intact without rashes.  MUSCULOSKELETAL: Patient is observed sitting in wheelchair.   The patient has  5/5 strength for the bilateral shoulder abductors, elbow flexors/extensors, wrist extensors, finger flexors/abductors.  5/5 strength bilateral hip flexors, knee flexors/extensors, ankle dorsi/plantar flexors.    NEUROLOGICAL:   Negative Gerardo sign bilaterally.  Trace to 1+ bilateral biceps, triceps, brachioradialis reflexes.  Subjective slight sensory deficit left upper extremity diffusely.     RESULTS:   I reviewed the x-ray lumbar spine from [facility] dated March 11, 2024.  This shows stable compression fractures at L1, L2, and L5.  No new areas of vertebral body height loss.    I reviewed the CT thoracic spine from [facility] dated March 8, 2024.  This shows a subacute L1 compression fracture with 25% loss of vertebral body height.  There is no retropulsion.    I reviewed theCT cervical spine from [facility] dated February 7, 2023.  This shows multilevel cervical spondylosis with moderate to severe spinal canal stenosis C4-5, C5-6, C6-7 and moderate to severe spinal canal stenosis right C3-4.  There is also moderate bilateral foraminal stenosis bilaterally C4-5, moderate to severe bilateral foraminal stenosis C5-6, and moderate bilateral foraminal stenosis C6-7.  There is a possible densely mineralized meningioma in the dorsal spinal canal at C4-5 measuring 4 mm contributing to moderate to severe spinal canal stenosis.  This lesion is new from 2018.    I reviewed the MRI cervical spine no contrast from [facility] dated July 5, 2023.  The possible densely mineralized meningioma at C4-5 is not visualized on the MRI.  There is multilevel cervical spondylosis.  At C2-3 there is moderate right foraminal stenosis.  At C3-4 there is mild spinal canal stenosis with moderate left and severe right  foraminal stenosis.  At C4-5 there is moderate spinal canal stenosis with moderate right and severe left foraminal stenosis.  At C5-6 there is moderate to severe spinal canal stenosis with severe left and moderate right foraminal stenosis.  At C6-7 there is moderate spinal canal stenosis with severe left and moderate right foraminal stenosis.    I reviewed the MRI lumbar spine from [facility] dated June 30, 2023.  This shows chronic compression deformities at L1, L2, and L5.  There is epidural lipomatosis and multilevel spondylosis.  At L5-S1 there is mild to moderate thecal sac stenosis, mild left and mild to moderate right foraminal stenosis.  At L4-5 there is moderate to severe thecal sac stenosis and mild bilateral foraminal stenosis.    I reviewed a CT lumbar spine from [facility] dated March 12, 2020.  This shows an L1 superior endplate compression fracture without retropulsion.  There is also an L2 compression fracture with 20% height loss.  There is also an L5 superior endplate compression fracture with 25% height loss.    I reviewed the report of a CT abdomen pelvis from [facility] dated November 24, 2019.  This shows a chronic L1 vertebral body compression fracture.

## 2024-05-14 ENCOUNTER — ALLIED HEALTH/NURSE VISIT (OUTPATIENT)
Dept: FAMILY MEDICINE | Facility: CLINIC | Age: 64
End: 2024-05-14
Payer: COMMERCIAL

## 2024-05-14 ENCOUNTER — OFFICE VISIT (OUTPATIENT)
Dept: PHYSICAL MEDICINE AND REHAB | Facility: CLINIC | Age: 64
End: 2024-05-14
Payer: COMMERCIAL

## 2024-05-14 ENCOUNTER — ANTICOAGULATION THERAPY VISIT (OUTPATIENT)
Dept: ANTICOAGULATION | Facility: CLINIC | Age: 64
End: 2024-05-14

## 2024-05-14 VITALS
DIASTOLIC BLOOD PRESSURE: 70 MMHG | BODY MASS INDEX: 47.09 KG/M2 | SYSTOLIC BLOOD PRESSURE: 147 MMHG | HEIGHT: 66 IN | WEIGHT: 293 LBS | HEART RATE: 69 BPM

## 2024-05-14 DIAGNOSIS — Z79.01 LONG TERM (CURRENT) USE OF ANTICOAGULANTS: ICD-10-CM

## 2024-05-14 DIAGNOSIS — I26.99 PULMONARY EMBOLISM WITH INFARCTION (H): Primary | ICD-10-CM

## 2024-05-14 DIAGNOSIS — M48.02 CERVICAL STENOSIS OF SPINAL CANAL: Primary | ICD-10-CM

## 2024-05-14 DIAGNOSIS — Z86.711 HISTORY OF PULMONARY EMBOLISM: ICD-10-CM

## 2024-05-14 DIAGNOSIS — M81.8 IDIOPATHIC OSTEOPOROSIS: Primary | ICD-10-CM

## 2024-05-14 LAB — INR (EXTERNAL): 2 (ref 0.9–1.1)

## 2024-05-14 PROCEDURE — 96372 THER/PROPH/DIAG INJ SC/IM: CPT | Performed by: INTERNAL MEDICINE

## 2024-05-14 PROCEDURE — 99207 PR NO CHARGE NURSE ONLY: CPT

## 2024-05-14 PROCEDURE — 99214 OFFICE O/P EST MOD 30 MIN: CPT | Performed by: PHYSICIAN ASSISTANT

## 2024-05-14 ASSESSMENT — PAIN SCALES - GENERAL: PAINLEVEL: MODERATE PAIN (4)

## 2024-05-14 NOTE — PATIENT INSTRUCTIONS
My nurse will call you with the results of your CT scan in July.     I am so happy that you are feeling better!  If your pain returns we can repeat the injection(s).  You can have injections up to 4 times per year.    If your pain returns or if you have any other questions or concerns please send me a Dianping message or call our nurse line at [phone].

## 2024-05-14 NOTE — LETTER
5/14/2024         RE: Valentina Olmedo  [address]  [place] [address]        Dear Colleague,    Thank you for referring your patient, Valentina Olmedo, to the [facility]. Please see a copy of my visit note below.    Assessment:   Valentina Olmedo is a 64 y.o. female with past medical history significant for epilepsy, migraine, polyneuropathy, obstructive sleep apnea, COPD, history of PE, obesity, hyperlipidemia, hypothyroidism, hypertension, osteoporosis (on Prolia) , on warfarin, chronic kidney disease stage III, tobacco abuse, schizoaffective disorder, depression, PTSD, history of thyroid cancer, history of melanoma who presents today for follow-up regarding chronic neck pain with radiation into the right upper extremity with associated numbness, tingling, weakness.  Patient had a CT cervical spine at [facility] February 7, 2023 which showed moderate to severe spinal canal stenosis C4-5, C5-6, and C6-7 and moderate to severe spinal canal stenosis, right at C3-4.  There is also multilevel high-grade foraminal stenosis.  At C4-5 there is a 4 mm calcified lesion in the dorsal spinal canal which is new from 2018, possibly representing a densely mineralized meningioma.  The calcified lesion is not visualized on an MRI cervical spine.  The MRI of the cervical spine showed moderate to severe spinal canal stenosis C5-6 and C6-7 and multilevel high-grade foraminal stenosis.  Patient is following up after a C7-T1 interlaminar epidural steroid injection April 8, 2024 which provided 80% relief of pain.  She complains of left greater than right axial neck pain likely related to cervical facet arthropathy.  -Patient saw Dr. Jacobsen July 6, 2023 regarding her cervical spine findings.  She recommended weight loss prior to consideration of elective spinal surgery with a goal BMI of less than 40.           Plan:     A shared decision making plan was used.  The patient's values  and choices were respected.  The following represents what was discussed and decided upon by the physician assistant and the patient.      1.  DIAGNOSTIC TESTS:  - I reviewed the CT thoracic spine from March 2024.  - I reviewed the lumbar spine x-rays from March 2024.  -I reviewed the  CT cervical spine from [facility] from February 7, 2023.  - I reviewed the MRI cervical spine with and without contrast from [facility] from July 2023.  - We discussed this patient's case at spine rounds in July 2023.  Recommendation is for a repeat CT cervical spine in 1 year to monitor the densely calcified lesion.  This is scheduled for July 24, 2024.  - Also reviewed an MRI lumbar spine from 2020 which showed the L1 compression deformity.  Also reviewed a report of a CT abdomen pelvis from 2019 which described an L1 compression deformity.      2.  PHYSICAL THERAPY: Patient is currently physical therapy.  Encouraged to continue with physical therapy and the home exercises.    3.  MEDICATIONS: No changes are made to the patient's medications.  Patient sees the pain clinic.  - Patient can continue Tylenol as needed.  - Patient takes pregabalin 50 mg 3 times daily.  - Patient takes duloxetine 120 mg daily for mood.  - Patient cannot take NSAIDs since she is on warfarin.    4.  INTERVENTIONS:  - We discussed medial branch blocks as a workup for radiofrequency ablation today.  We decided that she would wait for her updated CT cervical spine first.  If the calcified lesion has changed, she would need to follow-up with Dr. Jacobsen.  If it is stable, we could consider medial branch blocks.  Based on her exam today I would recommend left-sided medial branch blocks in the mid cervical region.  - As long as patient has at least 50% relief of pain for at least 3 months we can also repeat the repeat C7-T1 interlaminar epidural steroid injection.    5.  PATIENT EDUCATION: Patient is in agreement the above plan.  All questions were  answered.    6.  FOLLOW-UP: My nurse will call the patient with the results of her follow-up CT scan.  If the lesion has changed I will refer her back to Dr. Jacobsen.  Otherwise, I recommend that the patient return to the clinic so that we can discuss medial branch blocks/RFA.    Subjective:     Valentina Olmedo is a 64 year old female who presents today for follow-up regarding chronic neck pain and back pain.  Patient is following up after a C7-T1 interlaminar epidural steroid injection April 8, 2024.  Patient reports 80% improvement in pain.  She reports the pain in her neck and both arms is improved.  She still feels some stiffness in the neck.  Typically this is equal bilaterally but lately it has been worse on the left.  She rates her pain today as a 4 out of 10.  At its worst it is a 10 out of 10.  At its best it is a 1 out of 10.  She denies any aggravating or alleviating factors to the pain.  She denies any new symptoms since she was last seen.      Treatment to date:  - Physical therapy off-and-on for many years.  She went to 1 session of physical therapy for chronic pain April 2, 2022.  - Outpatient physical therapy was August 2022.  - She has had home care PT and OT 2023.    - Chiropractic treatment was helpful in the past,  - C7-T1 interlaminar epidural steroid injection April 8, 2024 with 80% relief  - C7-T1 interlaminar epidural steroid injection November 9, 2023 with 70% relief of pain lasting about 3 months  - No spine surgeries  - Tylenol   - Patient takes duloxetine 120 mg daily for mood  - Tizanidine 4 mg 3 times daily  -Gabapentin caused a rash  - Pregabalin  - Prednisone    Review of systems:  Positive for numbness/tingling, weakness, headache, trip/stumble/falls,  difficulty with hand skills.  Negative for difficulty swallowing, loss of bowel/bladder control, inability to urinate, pain much worse at night, fevers, unintentional weight loss.  Objective:   CONSTITUTIONAL:  Vital signs as above.  No  acute distress.  The patient is well nourished and well groomed.  Patient is observed sitting in a wheelchair.  PSYCHIATRIC:  The patient is awake, alert, oriented to person, place and time.  The patient is answering questions appropriately with clear speech.  Normal affect.  HEENT: Normocephalic, atraumatic.  Sclera clear.    SKIN: Exposed skin is clean, dry, intact without rashes.  MUSCULOSKELETAL: Patient is observed sitting in wheelchair.   The patient has  5/5 strength for the bilateral shoulder abductors, elbow flexors/extensors, wrist extensors, finger flexors/abductors.  5/5 strength bilateral hip flexors, knee flexors/extensors, ankle dorsi/plantar flexors.    NEUROLOGICAL:   Negative Gerardo sign bilaterally.  Trace to 1+ bilateral biceps, triceps, brachioradialis reflexes.  Subjective slight sensory deficit left upper extremity diffusely.     RESULTS:   I reviewed the x-ray lumbar spine from [facility] dated March 11, 2024.  This shows stable compression fractures at L1, L2, and L5.  No new areas of vertebral body height loss.    I reviewed the CT thoracic spine from [facility] dated March 8, 2024.  This shows a subacute L1 compression fracture with 25% loss of vertebral body height.  There is no retropulsion.    I reviewed theCT cervical spine from [facility] dated February 7, 2023.  This shows multilevel cervical spondylosis with moderate to severe spinal canal stenosis C4-5, C5-6, C6-7 and moderate to severe spinal canal stenosis right C3-4.  There is also moderate bilateral foraminal stenosis bilaterally C4-5, moderate to severe bilateral foraminal stenosis C5-6, and moderate bilateral foraminal stenosis C6-7.  There is a possible densely mineralized meningioma in the dorsal spinal canal at C4-5 measuring 4 mm contributing to moderate to severe spinal canal stenosis.  This lesion is new from 2018.    I reviewed the MRI cervical spine no contrast from [facility] dated July 5, 2023.   The possible densely mineralized meningioma at C4-5 is not visualized on the MRI.  There is multilevel cervical spondylosis.  At C2-3 there is moderate right foraminal stenosis.  At C3-4 there is mild spinal canal stenosis with moderate left and severe right foraminal stenosis.  At C4-5 there is moderate spinal canal stenosis with moderate right and severe left foraminal stenosis.  At C5-6 there is moderate to severe spinal canal stenosis with severe left and moderate right foraminal stenosis.  At C6-7 there is moderate spinal canal stenosis with severe left and moderate right foraminal stenosis.    I reviewed the MRI lumbar spine from [facility] [facility] dated June 30, 2023.  This shows chronic compression deformities at L1, L2, and L5.  There is epidural lipomatosis and multilevel spondylosis.  At L5-S1 there is mild to moderate thecal sac stenosis, mild left and mild to moderate right foraminal stenosis.  At L4-5 there is moderate to severe thecal sac stenosis and mild bilateral foraminal stenosis.    I reviewed a CT lumbar spine from [facility] dated March 12, 2020.  This shows an L1 superior endplate compression fracture without retropulsion.  There is also an L2 compression fracture with 20% height loss.  There is also an L5 superior endplate compression fracture with 25% height loss.    I reviewed the report of a CT abdomen pelvis from [facility] dated November 24, 2019.  This shows a chronic L1 vertebral body compression fracture.      Again, thank you for allowing me to participate in the care of your patient.        Sincerely,        Lou Swanson PA-C

## 2024-05-14 NOTE — PROGRESS NOTES
Indication: Prolia  (denosumab) is a prescription medicine used to treat osteoporosis in patients who:   Are at high risk for fracture, meaning patients who have had a fracture related to osteoporosis, or who have multiple risk factors for fracture   Cannot use another osteoporosis medicine or other osteoporosis medicines did not work well   The timeline for early/late injections would be 4 weeks early and any time after the 6 month joe. If a patient receives their injection late, then the subsequent injection would be 6 months from the date that they actually received the injection    1.  When was the last injection?  11/14/23  2.  Did they check with their insurance for this calendar year?  Yes   3.  Is there an order in the chart?  Yes   4.  Has the patient had dental work involving the bone in the past month or will have work in the next 6 months?  No   5.  Did you have the patient wait 15 minutes after the injection?  Yes   6.  Remember to use .injection under the medication notes    The following steps were completed to comply with the REMS program for Prolia:  Reviewed information in the Medication Guide and Patient Counseling Chart, including the serious risks of Prolia  and the symptoms of each risk.  Advised patient to seek prompt medical attention if they have signs or symptoms of any of the serious risks.  Provided each patient a copy of the Medication Guide and Patient Brochure.     Clinic Administered Medication Documentation      Prolia Documentation    Indication: Prolia  (denosumab) is a prescription medicine used to treat osteoporosis in patients who:   Are at high risk for fracture, meaning patients who have had a fracture related to osteoporosis, or who have multiple risk factors for fracture.  Cannot use another osteoporosis medicine or other osteoporosis medicines did not work well.  The timeline for early/late injections would be 4 weeks early and any time after the 6 month joe. If a patient  receives their injection late, then the subsequent injection would be 6 months from the date that they actually received the injection.    When was the last injection?  23  Was the last injection at least 6 months ago? Yes  Has the prior authorization been completed?  Yes  Is there an active order (written within the past 365 days, with administrations remaining, not ) in the chart?  Yes  Patient denies any dental work involving the bone (e.g. tooth extraction or dental implants) in the past 4 weeks?  Yes  Patient denies plans for any dental work involving the bone (e.g. tooth extraction or dental implants) in the next 4 weeks? Yes    The following steps were completed to comply with the REMS program for Prolia:  Reviewed information in the Medication Guide and Patient Counseling Chart, including the serious risks of Prolia  and the symptoms of each risk.  Advised patient to seek prompt medical attention if they have signs or symptoms of any of the serious risks.  Provided each patient a copy of the Medication Guide and Patient Brochure.    Prior to injection, verified patient identity using patient's name and date of birth. Medication was administered. Please see MAR and medication order for additional information. Patient instructed to remain in clinic for 15 minutes and report any adverse reaction to staff immediately.    Vial/Syringe: Syringe  Was this medication supplied by the patient? No  Verified that the patient has refills remaining in their prescription.     Babatunde BAUER RN

## 2024-05-14 NOTE — PROGRESS NOTES
ANTICOAGULATION MANAGEMENT     Valentina Olmedo 64 year old female is on warfarin with therapeutic INR result. (Goal INR 2.0-3.0)    Recent labs: (last 7 days)     05/14/24  1255   INR 2.0*       ASSESSMENT     Source(s): Chart Review and Home Care/Facility Nurse     Warfarin doses taken: Less warfarin taken than planned which may be affecting INR. Patient states the surgeon advised her not to take warfarin the night of her EGD on 5/7/24. So patient took her booster doses on 5/8/24 and 5/9/24.   Diet: No new diet changes identified  Medication/supplement changes: None noted  New illness, injury, or hospitalization: Yes: EGD on 5/7/24  Signs or symptoms of bleeding or clotting: No  Previous result: Subtherapeutic  Additional findings: None       PLAN     Recommended plan for temporary change(s) affecting INR     Dosing Instructions: Continue your current warfarin dose with next INR in 1 week       Summary  As of 5/14/2024      Full warfarin instructions:  10 mg every Sun, Tue, Thu; 7.5 mg all other days   Next INR check:  5/21/2024               Telephone call with Katelyn home care nurse who agrees to plan and repeated back plan correctly    Orders given to  Homecare nurse/facility to recheck    Education provided:   Please call back if any changes to your diet, medications or how you've been taking warfarin  Symptom monitoring: monitoring for bleeding signs and symptoms, monitoring for clotting signs and symptoms, and monitoring for stroke signs and symptoms  Contact [phone]  with any changes, questions or concerns.     Plan made per [facility] anticoagulation protocol    Zari Cheng RN  Anticoagulation Clinic  5/14/2024    _______________________________________________________________________     Anticoagulation Episode Summary       Current INR goal:  2.0-3.0   TTR:  52.3% (1 y)   Target end date:  Indefinite   Send INR reminders to:  [facility]    Indications    History of pulmonary embolism (Resolved)  [Z86.711]  Pulmonary embolism with infarction (H) [I26.99]  Long term (current) use of anticoagulants [Z79.01]  History of pulmonary embolism [Z86.711]             Comments:               Anticoagulation Care Providers       Provider Role Specialty Phone number    Promise Vanegas MD Referring Family Medicine [phone]    Donald Rooney MD Referring Internal Medicine [phone]    Jose Maria Morin MD Referring Family Medicine [phone]

## 2024-05-15 DIAGNOSIS — J30.2 SEASONAL ALLERGIC RHINITIS, UNSPECIFIED TRIGGER: ICD-10-CM

## 2024-05-15 RX ORDER — FLUTICASONE PROPIONATE 50 MCG
SPRAY, SUSPENSION (ML) NASAL
Qty: 16 G | Refills: 2 | Status: SHIPPED | OUTPATIENT
Start: 2024-05-15

## 2024-05-15 NOTE — TELEPHONE ENCOUNTER
"Left message to call back for: Valentina  Information to relay to patient: Detailed message left on patient's voicemail with Dr. Morin message below, and asked patient to call clinic back with any other questions.         \"Jose Maria Morin MD NS    5/15/24  1:23 PM  Unsigned Note  Likely needs face-to-face visit.  Suggest we cover this during her appointment on 5/21.\"            Paula Rollins RN  [facility]   "

## 2024-05-16 ENCOUNTER — MEDICAL CORRESPONDENCE (OUTPATIENT)
Dept: HEALTH INFORMATION MANAGEMENT | Facility: CLINIC | Age: 64
End: 2024-05-16
Payer: COMMERCIAL

## 2024-05-20 ENCOUNTER — OFFICE VISIT (OUTPATIENT)
Dept: PALLIATIVE MEDICINE | Facility: OTHER | Age: 64
End: 2024-05-20
Attending: NURSE PRACTITIONER
Payer: COMMERCIAL

## 2024-05-20 ENCOUNTER — HOSPITAL ENCOUNTER (OUTPATIENT)
Dept: CARDIAC REHAB | Facility: HOSPITAL | Age: 64
Discharge: HOME | End: 2024-05-20
Attending: INTERNAL MEDICINE
Payer: COMMERCIAL

## 2024-05-20 VITALS — DIASTOLIC BLOOD PRESSURE: 79 MMHG | HEART RATE: 87 BPM | SYSTOLIC BLOOD PRESSURE: 123 MMHG | OXYGEN SATURATION: 94 %

## 2024-05-20 DIAGNOSIS — R53.81 PHYSICAL DECONDITIONING: ICD-10-CM

## 2024-05-20 DIAGNOSIS — M54.12 CERVICAL RADICULOPATHY: Primary | ICD-10-CM

## 2024-05-20 DIAGNOSIS — G47.30 SLEEP APNEA, UNSPECIFIED TYPE: ICD-10-CM

## 2024-05-20 DIAGNOSIS — R06.09 EXERTIONAL DYSPNEA: ICD-10-CM

## 2024-05-20 PROCEDURE — 99417 PROLNG OP E/M EACH 15 MIN: CPT | Performed by: NURSE PRACTITIONER

## 2024-05-20 PROCEDURE — G0463 HOSPITAL OUTPT CLINIC VISIT: HCPCS | Performed by: NURSE PRACTITIONER

## 2024-05-20 PROCEDURE — 99215 OFFICE O/P EST HI 40 MIN: CPT | Performed by: NURSE PRACTITIONER

## 2024-05-20 PROCEDURE — G2211 COMPLEX E/M VISIT ADD ON: HCPCS | Performed by: NURSE PRACTITIONER

## 2024-05-20 PROCEDURE — G0238 OTH RESP PROC, INDIV: HCPCS

## 2024-05-20 RX ORDER — PREGABALIN 75 MG/1
75 CAPSULE ORAL 3 TIMES DAILY
Qty: 90 CAPSULE | Refills: 0 | Status: SHIPPED | OUTPATIENT
Start: 2024-05-20 | End: 2024-06-14

## 2024-05-20 ASSESSMENT — PAIN SCALES - GENERAL: PAINLEVEL: MILD PAIN (3)

## 2024-05-20 NOTE — PROGRESS NOTES
Date:05/20/2024      COMPREHENSIVE PAIN CLINIC FOLLOW UP EVALUATION    I had the pleasure of meeting Ms. Valentina Olmedo on 11/7/2023 in the [facility] in consult for [facility] with regards to her pain.  The patient is a 63 year old female with past medical history of chronic anticoagulation, h/o PE, physical deconditioning, CHRIS, GERD, seizure disorder, h/o CVA, cervical stenosis, chronic intractable pain, morbid obesity, schizoaffective disorder/depression, borderline personality disorder, PTSD, osteoporosis on Prolia, who presents for evaluation of chronic pain.  Patient is on warfarin.      Updates since last appointment on 04/09/2024.  Presents with AL, Significant other with a wheelchair.  .  Last script for lyrica 50mg TID 84 tabs for 28 days.  Side Effects: none will increase today 75mg TID    Tens unit: [employer] brochure for patient support provided.  CT scan 07/24/2024 04/17/2024 Dr. Bhatt, Rheumatology - blood work ordered    PT at [facility]    She has been referred for pulmonary rehab.    RN comes every Tuesday to refill her medications.      Employment:  On SSD.     Exercise:  Last PT was 3-4 months ago.  She continues to do exercises on a regular basis.  Patient is attending hand therapy [facility] twice weekly.      Hobbies:  She does her HEP.    Mental Health:    Patient endorses chronic anxiety and depression.  Patient does follow with a [facility]ana Morgan mental health care provider every few month.      04/08/2025 [facility].  [facility] recommends  for a repeat CT cervical spine in 1 year to monitor the densely calcified lesion. This is scheduled for July 24, 2024.     She is attending PT.    B/l occipital nerve blocks with Dr. Zeng - not done    She has been referred to [facility] for her b/l knee pain.  Referred to Neurology for evaluate peripheral neuropathy and epilepsy.   07/06/2023 Dr. Jacobsen recommends weight loss prior to consideration of elective spinal surgery  "with goal BMI less than 40.         Interventions/Injections:     Progress Notes Reviewed:  05/14/2024 [facility]  We discussed medial branch blocks as a workup for radiofrequency ablation today.  We decided that she would wait for her updated CT cervical spine first.  If the calcified lesion has changed, she would need to follow-up with Dr. Jacobsen.  If it is stable, we could consider medial branch blocks.  Based on her exam today I would recommend left-sided medial branch blocks in the mid cervical region.  - As long as patient has at least 50% relief of pain for at least 3 months we can also repeat the repeat C7-T1 interlaminar epidural steroid injection.      Any hospitalizations/ER/UC visits since last appointment:  no  Any falls/accidents since last appointment:  no      Primary Pain :  Patient endorses chronic pain in cervical spine L>R that started years ago without a precipitating event.  Pain radiates down b/l arms R>L and into her hands.       Characteristics:  No changes in pain characteristics since last appointment.      Her arms feel weak.  Patient has constant numbness and tingling in b/l fingers.  Patient has not had any spine surgery in the past.  The patient describes the pain as constant, sharp, pressure, heavy, throbbing, dull, shooting.       What makes the pain better:  She reports that the pain is made better by \"unknown\".   What makes the pain worse:   She reports that the pain is made worse by \"unknown\".   05/20/2024 current pain on 0/10 VAS:   7  Worst pain:   9   Best pain: 1  04/09/2024 current pain on 0/10 VAS:   6   Worst pain:  9     Best pain: 1       11/08/2023 current pain score at 4/10, but it can be as low as 4/10 or as severe as 9/10.      Current Pain Related Medications:  Any medications changes since last appointment:    Pregabalin 50mg TID - currently titrating to a goal of 100mg TID  Tegretol 200mg am and 400pm and 200mg q hs  Duloxetine 60mg 2 caps " daily  Meclizine 25mg 1/2 to 1 tab prn dizziness - discontinued  Melatonin 3mg q hs  Oxybutynin ER  Prazosin 2mg 1 tab q hs  Seroquel 25mg BID  Chantix 1mg BID  Warfarin 5mg per anticoagulation clinic   Briviact 100mg TID - epilepsy      NOTE:  gabapentin causes rash, Lamictal causes dizziness, Pregabalin 25mg TID  Tegretol 200mg am and 400pm and 200mg q hs  Duloxetine 60mg 2 caps daily  Meclizine 25mg 1/2 to 1 tab prn dizziness  Melatonin 3mg q hs  Oxybutynin ER  Prasoin 2mg 1 tab q hs  Seroquel 25mg BID  Chantix 1mg BID  Warfarin 5mg per anticoagulation clinic   Briviact 100mg TID - epilepsy      NOTE:  gabapentin causes rash, Lamictal causes dizziness, Tizanidine 4mg TID - discontinued      Therapies discuss on initial consult:   Physical therapy, Pain Psychology, TENs unit, Grounding Mat, Frequency Specific Micro Current, Anti-inflammatory Lifestyle    Social:  Patient is  to Al and lives in an apartment in [place].  She is independent in ADL's.  She has a cat.    Employment: SSD    Exercise:  Last PT was 3-4 months ago.  She continues to do exercises on a regular basis.  Patient is attending hand therapy [facility] twice weekly.    Hobbies:    Mental Health:    Patient endorses chronic anxiety and depression.  Patient does follow with a [facility] Morgan mental health care provider every few months.            Plan on 04/09/2024:  Increase lyrica by 1 25mg tab every 3 days until taking lyrica 25mg 2 tabs TID to help manage neuropathic pain.      Follow-up in clinic in 1 month to re-evaluate neuropathic pain.        Updates since last appointment on 11/08/2023 initial consult.  She presents with significant other.    She is attending PT.  She had L) cataract surgery. R) cataract surgery 04/18/2024.    B/l occipital nerve blocks with Dr. Zeng - not done  [facility] - yesterday      She has been referred to [facility] for her b/l knee pain.  Referred to Neurology for evaluate peripheral neuropathy and  "epilepsy. Patient taking lyrica 25mg TID with no negative side effects.  Will increase today.    07/06/2023 Dr. Jacobsen recommends weight loss prior to consideration of elective spinal surgery with goal BMI less than 40.       Interventions/Injections:   04/08/2024 - Repeat C7-T1 VIOLETA at spine center yesterday - discussed need 7-10 days before steroid takes effect for pain relief.  11/09/2023 C7-T1 VIOLETA provided 70% relief for almost 3 months at spine center      Progress Notes Reviewed:  04/08/2024 MISHEL Teresa, [facility] - cervical, thoracic and lumbar pain. Referred to Orthopedics for b/l knee pain.  repeat CT cervical spine in 1 year to monitor the densely calcified lesion.  This is scheduled for July 24, 2024.   04/02/2024 PT  04/01/2024 Nadeen Sandy NP, Sleep Medicine  03/27/2024 Dr. Jose Maria Starr, pre op for cataract surgery  03/19/2024 Conchita Smith, [facility] appt.    Any hospitalizations/ER/UC visits since last appointment:none  Any falls/accidents since last appointment: none      Primary Pain :  Patient endorses chronic pain in cervical spine L>R that started years ago without a precipitating event.  Pain radiates down b/l arms R>L and into her hands.        Characteristics:  No changes in pain characteristics since last appointment.    Her arms feel weak.  Patient has constant numbness and tingling in b/l fingers .  Patient has not had any spine surgery in the past.  The patient describes the pain as constant, sharp, heavy, throbbing, dull, shooting.       What makes the pain better:  She reports that the pain is made better by \"unknown\".   What makes the pain worse:   She reports that the pain is made worse by \"unknown\".   04/09/2024 current pain on 0/10 VAS:       Worst pain:        Best pain:         11/08/2023 current pain score at 4/10, but it can be as low as 4/10 or as severe as 9/10.    Current Pain Related Medications:  Any medications changes since last " appointment: yes changes in bold    Pregabalin 25mg TID  Tegretol 200mg am and 400pm and 200mg q hs  Duloxetine 60mg 2 caps daily  Meclizine 25mg 1/2 to 1 tab prn dizziness  Melatonin 3mg q hs  Oxybutynin ER  Prasoin 2mg 1 tab q hs  Seroquel 25mg BID  Tizanidine 4mg TID - discontinued  Chantix 1mg BID  Warfarin 5mg per anticoagulation clinic   Briviact 100mg TID - epilepsy      NOTE:  gabapentin causes rash, Lamictal causes dizziness      Therapies discuss on initial consult:   Physical therapy, Pain Psychology, TENs unit, Grounding Mat, Frequency Specific Micro Current, Anti-inflammatory Lifestyle    Social:  Patient is  and lives in an apartment in [place].  She is independent in ADL's.  She has a cat.      Employment:      Exercise:  Last PT was 3-4 months ago.  She continues to do exercises on a regular basis.  Patient is attending hand therapy [facility] twice weekly.      Hobbies:      Mental Health:    Patient endorses chronic anxiety and depression.  Patient does follow with a [facility] mental health care provider every few month.                  Plan on initial consult 11/08/2023:  A multimodal plan was developed today to treat your pain.  Multimodal analgesia is a strategy that reduces reliance on opioids through the use of non-opioid analgesics and therapies that have different mechanisms of action.      Diagnostics: cervical MRI was reviewed today.        Medications:  Start pregabalin 25mg TID which was your previous dose.  Every 2 wks add a tablet until you are taking 2 tabs three times a day.        Current Treatments:  Pregabalin 25mg TID  Tegretol 200mg am and pm and 400mg q hs  Duloxetine 60mg 2 caps daily  Meclizine 25mg 1/2 to 1 tab prn dizziness  Melatonin 3mg q hs  Oxybutynin ER  Prasoin 2mg 1 tab q hs  Seroquel 25mg BID  Tizanidine 4mg TID  Chantix 1mg BID  Warfarin 5mg per anticoagulation clinic   Briviact 100mg TID - epilepsy    NOTE:  gabapentin causes rash, Lamictal causes  dizziness      The following OTC pain medications may be helpful, use as directed: Voltaren Gel 1%, CBD products, Arnica products, Capsaicin products, Australian Dream Cream, Epson It, Arnica Products, Lidocaine Patch, Solanpas, Biofreeze, Aspercream, Tiger Balm and Jeromy Emu cream.  Apply heat or cold PRN.      Therapies:  Discussed Pain Psychology - consider in the future  Psychological treatments are also important part of pain management.  Understanding and managing the thoughts, emotions and behaviors that accompany the discomfort can help you cope more effectively with your pain and can actually reduce the intensity of your pain.      PHYSICAL THERAPY -  Encourage patient to continue working with physical therapy.  Discussed the importance of core strengthening, ROM, stretching exercises with the patient and how each of these entities is important in decreasing pain.  Explained to the patient that the purpose of physical therapy is to teach the patient a home exercise program.  These exercises need to be performed every day in order to decrease pain and prevent future occurrences of pain.        Discussed Grounding Mat - handout provided  https://[email or web address]/[email or web address]?[email or web address]    Discussed Frequency Specific Microcurrent - handout provided  Treatment for Neuropathic Pain.   [facility] [phone]  [facility] [phone]  [facility] chiropractic [phone]  [facility] chiropractic [phone]  May be able to be billed as a chiropractic service depending on your insurance coverage.     Discussed Acupuncture.    Fax for [employer] TENs unit.    Interventions:      B/l occipital nerve blocks with Dr. Zeng    Keep appt for [facility]TA C7-T[facility] at [facility]    Follow up:   1-2 months in clinic after you are taking pregabalin 25mg 2 tabs TID.        -------------------------------------------------------------------------------------------------------------------------------------  History of pain on  "initial consult 11/07/2023  Subjective: She presents with Al in a wheel chair.    Patient endorses chronic pain in cervical spine L>R that started years ago without a precipitating event.  Pain radiates down b/l arms R>L and into her hands.  Her arms feel weak.  Patient has constant numbness and tingling in b/l fingers .  Patient has not had any spine surgery in the past.  The patient describes the pain as constant, sharp, heavy, throbbing, dull, shooting.  She reports that the pain is made worse by \"unknown\".  Her pain is improved with \"unknown\".  She rates her currenty pain score at 4/10, but it can be as low as 4/10 or as severe as 9/10. She is scheduled for [facility]TA [facility] on 11/09/2023.  She uses a wheel chair at times due to injury to low back from a fall. She fx her L) elbow in September.      Patient denies any anxiety or depression.  Patient does follow with a Breana Morgan, mental health care provider every few month.  Last PT was 3-4 months ago.  She continues to do exercises on a regular basis.  Patient is attending hand therapy [facility] twice weekly.    Progress Notes Reviewed:  10/18/2023 Dr. Gracie Jacobsen, Neurological Surgery - cervical stenosis/chronic pain.  She needs to lose weight prior to surgery.  Discussed C7-T1 VIOLETA.  She wants to avoid surgery as long as she can.  10/17/2023 Dr. Mariann Puente -   10/13/2023 [facility]    She denies any new problems with falls or balance, any new numbness or weakness of the arms or legs, any new bowel or bladder incontinence, any night sweats or unexplained fevers, or any sudden or unexpected weight loss.      Valentina Olmedo has not been seen at a pain clinic in the past.        Current Treatments:  She does not know her medication without looking them up on her phone.  Pregabalin 25mg TID per Lou Norwood, [facility]  Tegretol 200mg am and pm and 400mg q hs per Dr. Benitez for epilepsy  Meclizine 25mg 1/2 to 1 tab BID-TID " dizziness - vertigo  Melatonin 3mg q hs  Oxybutynin ER - overactive bladder  Prasoin 2mg 1 tab q hs per Dr. Puente  Seroquel 25mg BID per Dr. Puente  Tizanidine 4mg TID  Chantix 1mg BID  Warfarin 5mg per anticoagulation clinic   Briviact 100mg TID - epilepsy    NOTE:  gabapentin causes rash, Lamictal causes dizziness    Previous Medication Treatments Included:  Anti-convulsants: gabapentin causes rash  Muscle relaxors:   Anti-depressants: Lamictal causes dizziness, Duloxetine 60mg 2 caps daily per Dr. Puente  Benzodiazapine's:   Acetaminophen/NSAIDs: No NSAID due to warfarin, acetaminophen is not effective  Topicals: biofreeze causes itching, burning,  Opioids: not suppose to take opioids with warfarin  Coumadin    Other Treatments Have Included:  Physical therapy: PT 3 months ago, currently in hand therapy at [facility]  Pain Psychology:   Chiropractic:   Acupuncture:   TENs Unit:   Injections:   Surgeries:   Dry Needling:   Massage:      Past Medical History:  Medical history reviewed.  Past Medical History:   Diagnosis Date    Adrenal mass (H24) 10/12/2015    Chen Null MD  They were incidentally discovered on the CAT scan of the abdomen from December 2011 which was done for evaluation of kidney stones. F/up CT of the abdomen done on 6/26/12 showed a stable 5 mm nodular opacity in the right lower lung lobe, adjacent to the diagram. Bilateral adrenal masses average density measured less than 0 Hounsfield units, consistent with benign etio    Anxiety     Anxiety     Arthritis     Borderline personality disorder (H)     Cancer (H)     COPD (chronic obstructive pulmonary disease) (H)     Depression     Depressive disorder     History of COVID-19     Hyperlipidemia     Hypertension     Hypertension     Hyponatremia     Hypothyroidism     Malignant neoplasm of thyroid gland (H)     Chen Null MD  She had R hemithyroidectomy for a right neck tumor in 1994. According to the patient, the tumor was  benign. She is not sure whether or not the tumor belonged to the thyroid gland. The surgery was done here at the [facility]. In January 2011, she was diagnosed with kidney stones. She was found to have an elevated calcium level of 11.7, with a PTH level of 368. Stone an    Primary hyperparathyroidism (H24)           PTSD (post-traumatic stress disorder)     Pulmonary embolism with infarction (H) 01/12/2021    Recurrent otitis media     Seizure disorder (H)     Sleep apnea     cpap at [facility]    Stroke (H)     Tobacco abuse     Vertigo       Patient Active Problem List   Diagnosis    Essential hypertension    CHRIS (obstructive sleep apnea)    Seasonal and perennial allergic rhinoconjunctivitis of right eye    Closed head injury    History of thyroid cancer    Adrenal mass, left (H24)    Class 3 severe obesity due to excess calories with serious comorbidity and body mass index (BMI) of 50.0 to 59.9 in adult (H)    Nonintractable epilepsy without status epilepticus (H)    Esophageal reflux    Mixed hyperlipidemia    HLD (hyperlipidemia)    Hypothyroidism    Chronic obstructive pulmonary disease (H)    Schizoaffective disorder, depressive type (H)    DNR (do not resuscitate)    Migraine headache    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)    Fracture of vertebra due to osteoporosis with routine healing, subsequent encounter    Major depressive disorder, recurrent episode, moderate (H)    Venous stasis    Peripheral polyneuropathy    Pulmonary embolism with infarction (H)    Idiopathic osteoporosis    Physical deconditioning    PTSD (post-traumatic stress disorder)    Long term (current) use of anticoagulants    History of melanoma    Seasonal affective disorder (H24)    Vision changes    Bilateral hand pain    Problem related to housing and economic circumstances    History of pulmonary embolism    Medial knee pain, right    Bilateral sensorineural hearing loss    Intertrigo    Dysfunction of both eustachian tubes    Age-related  cataract of both eyes, unspecified age-related cataract type    Chronic intractable pain    Strain of lumbar paraspinal muscle, initial encounter    Cervical stenosis of spinal canal    Cervical radiculopathy         Past Surgical History:  Pertinent surgical history reviewed.  Past Surgical History:   Procedure Laterality Date    ABDOMEN SURGERY      ADRENALECTOMY Left     BRAIN SURGERY      COLONOSCOPY N/A 5/7/2024    Procedure: WITH COLONOSCOPY with polypectomies;  Surgeon: Walt Lira MD;  Location: [facility] OR    CRANIOTOMY FOR TEMPORAL LOBECTOMY Right     x3 for seizure    DILATION AND CURETTAGE      ESOPHAGOSCOPY, GASTROSCOPY, DUODENOSCOPY (EGD), COMBINED N/A 5/7/2024    Procedure: and biopsies;  Surgeon: Walt Lira MD;  Location: [facility] OR    ESOPHAGOSCOPY, GASTROSCOPY, DUODENOSCOPY (EGD), DILATATION, COMBINED N/A 5/7/2024    Procedure: ESOPHAGOGASTRODUODENOSCOPY with esophageal dilation;  Surgeon: Walt Lira MD;  Location: [facility] OR    HEAD & NECK SURGERY      HYSTERECTOMY, PAP NO LONGER INDICATED N/A     LITHOTRIPSY      PARATHYROID GLAND SURGERY      resection of adenoma    REFRACTIVE SURGERY Bilateral     RELEASE CARPAL TUNNEL Bilateral     TONSILLECTOMY & ADENOIDECTOMY      TOTAL THYROIDECTOMY      TOTAL VAGINAL HYSTERECTOMY      39 years old. Benign          Medications: Pertinent medications reviewed.  Current Outpatient Medications   Medication Sig Dispense Refill    albuterol (PROAIR HFA) 108 (90 Base) MCG/ACT inhaler Inhale 2 puffs into the lungs every 4 hours as needed for shortness of breath or wheezing 8 g 4    ARIPiprazole (ABILIFY) 10 MG tablet Take 1 tablet (10 mg) by mouth every morning 90 tablet 3    Brivaracetam (BRIVIACT) 100 MG tablet Take 150 mg by mouth 2 times daily       carBAMazepine (TEGRETOL) 200 MG tablet 200mg in the  at lunch and 400mg at HS      Cyanocobalamin (VITAMIN B12) 1000 MCG TBCR Take 1 tablet by mouth      denosumab (PROLIA)  60 MG/ML SOSY injection Inject 60 mg Subcutaneous once Per pt report      DULoxetine (CYMBALTA) 60 MG capsule Take 2 capsules (120 mg) by mouth daily 180 capsule 3    EPINEPHrine (ANY BX GENERIC EQUIV) 0.3 MG/0.3ML injection 2-pack Inject 0.3 mLs (0.3 mg) into the muscle as needed for anaphylaxis 2 each 1    esomeprazole (NEXIUM) 40 MG DR capsule TAKE 1 CAPSULE BY MOUTH EVERY MORNING BEFORE BREAKFAST (TAKE 30-60 MINUTES BEFORE EATING) 28 capsule 2    fexofenadine (ALLEGRA) 180 MG tablet TAKE 1 TABLET BY MOUTH DAILY 90 tablet 3    fluticasone (FLONASE) 50 MCG/ACT nasal spray INHALE 1 SPRAY IN EACH NOSTRIL DAILY 16 g 2    GAVILYTE-G 236 g suspension       levothyroxine (SYNTHROID/LEVOTHROID) 300 MCG tablet Take 1 tablet (300 mcg) by mouth daily 90 tablet 3    losartan (COZAAR) 25 MG tablet Take 0.5 tablets (12.5 mg) by mouth daily 90 tablet 1    melatonin 3 MG tablet Take 1 tablet (3 mg) by mouth nightly as needed for sleep 90 tablet 4    multivitamin with iron (CHROMAGEN) TABS half-tab Take 1 tablet by mouth daily      Nutritional Supplements (NUTRITIONAL SHAKE HIGH PROTEIN) LIQD Take 1 each by mouth every morning Dispense Premier protein shakes. Replace breakfast with 1 shake daily. 9900 mL 11    nystatin (MYCOSTATIN) 390837 UNIT/GM external cream Apply topically 2 times daily as needed (skin infection/inflammation) (Apply to the most sensitive inflamed areas of skin) 90 g 2    polyethylene glycol (MIRALAX) 17 GM/Dose powder       polymixin b-trimethoprim (POLYTRIM) 84905-6.1 UNIT/ML-% ophthalmic solution Place 1-2 drops into the right eye every 4 hours 10 mL 0    prazosin (MINIPRESS) 2 MG capsule Take 1 capsule (2 mg) by mouth at bedtime 90 capsule 1    prednisoLONE acetate (PRED FORTE) 1 % ophthalmic suspension       pregabalin (LYRICA) 50 MG capsule Take 1 capsule (50 mg) by mouth 3 times daily 90 capsule 2    QUEtiapine (SEROQUEL) 25 MG tablet Take 1-2 tablets (25-50 mg) by mouth daily For agitation. 180 tablet  4    rosuvastatin (CRESTOR) 20 MG tablet TAKE 1 TABLET BY MOUTH DAILY 28 tablet 12    salmeterol (SEREVENT DISKUS) 50 MCG/ACT inhaler INHALE 1 PUFF INTO THE LUNGS TWICE A DAY 60 each 5    senna (SENNA-TIME) 8.6 MG tablet TAKE 2 TABLETS (17.2MG) BY MOUTH TWICE A  tablet 11    umeclidinium (INCRUSE ELLIPTA) 62.5 MCG/ACT inhaler Inhale 1 puff into the lungs daily 30 each 2    varenicline (CHANTIX) 1 MG tablet Take 1 tablet (1 mg) by mouth 2 times daily 56 tablet 0    vitamin D3 (CHOLECALCIFEROL) 50 mcg (2000 units) tablet TAKE 2 TABLETS BY MOUTH DAILY 180 tablet 3    warfarin ANTICOAGULANT (COUMADIN) 5 MG tablet TAKE 2 TABLETS BY MOUTH ON MON AND FRI ; TAKE 2 & 1/2 TABLETS BY MOUTH ALL OTHER DAYS AS DIRECTED BY INR CLINIC 56 tablet 11    zolpidem (AMBIEN) 5 MG tablet Take tablet by mouth 15 minutes prior to sleep, for Sleep Study 1 tablet 0       [place] Prescription Monitoring Program reviewed 05/20/2024.  No concern for abuse or misuse of controlled medications based on this report.  04/22/2024 Briviact 100mg 84 tabs for 28 days  04/22/2024 Pregabalin 50mg 84 tabs for 28 days  04/01/2024 ambin 10mg 1 tab for 1 day  03/20/2024 Pregabalin 50mg 84 tabs for 28 days  03/20/2024 Briviact 100 84 tabs for 28 days  02/21/2024 Pregabalin 25mg 168 tabs for 28 days  02/21/2024 Briviact 100 84 tabs for 28 days  01/26/2024 Pregabalin 25mg 168 tabs for 28 days  01/24/2024 Briviact 100 84 tabs for 28 days  01/05/2024 Pregabalin 25mg 168 tabs for 28 days  10/18/2023 Briviact 100mg 84 tabs for 28 days  10/17/2023 Pregabalin 25mg 84 tabs for 28 days  10/04/2023 Pregabalin 25mg 84 tabs for 28 days  09/27/2023 Briviact 100mg 84 tabs for 28 day    Allergies: Pertinent allergies reviewed.     Allergies   Allergen Reactions    Aspirin Shortness Of Breath    Bees Anaphylaxis    Lamictal [Lamotrigine] Dizziness    Latex Itching    Phenobarbital      aggression    Vistaril [Hydroxyzine] Other (See Comments)     Sluggish, unable to wake up     Gabapentin Rash       Family History:   family history includes Alcoholism in her brother and sister; Chronic Obstructive Pulmonary Disease in her mother; Depression in her sister; Diabetes in her sister; Lung Cancer (age of onset: 76) in her maternal grandfather; No Known Problems in her sister; Other - See Comments in her brother and father; Skin Cancer in her father.    Social History:   Patient is  and lives in an apartment in [place].  She is independent in ADL's.  She has a cat.  She  reports that she has quit smoking. Her smoking use included cigarettes. She has never been exposed to tobacco smoke. She has never used smokeless tobacco. She reports that she does not drink alcohol and does not use drugs.  Social History     Social History Narrative    Not on file           Physical Exam:  LMP  (LMP Unknown)       Constitutional: She is oriented to person, place, and time.  She appears well-developed and well-nourished. She is not in acute distress. Morbidly obese.  HENT:     Head: Normocephalic and atraumatic.     Eyes: Pupils are equal, round, and reactive to light. EOM are normal. No scleral icterus.   Pulmonary/Chest:  NWOB. No respiratory distress.   Neurological: She is alert and oriented to person, place, and time. Coordination grossly normal.  Romberg test negative. House's test is negative  Skin: Skin is warm and dry. She is not diaphoretic.   Psychiatric: She has a normal mood and affect. Her behavior is normal. Judgment and thought content normal.  Patient answers questions appropriately.  MSK: Gait is normal.  Patient cannot walk on toes, heals, heal toe walk and perform heal to shin testing without difficulty.    Cervical spine:  ROM: mildly restricted in all planes  Rotation/ext to right: painful   Rotation/ext to left: painful   Myofascial tenderness: occipital nerves, left paracervical muscles, right paracervical muscles  Normal 5/5 UE strength bilaterally   Normal sensation to light  touch in the C4-T1 distributions bilaterally         Imaging:        DEXA was reviewed today.  IMPRESSION: Low bone density (OSTEOPENIA) with interval increase in BMD from the prior scan. T score meets the WHO criteria for low bone density (osteopenia) at one or more measured sites. The risk of osteoporotic fracture increases approximately two-fold for each standard deviation decrease in T-score.      EMG:  na      Diagnosis:  (M48.02) Cervical stenosis of spinal canal  (primary encounter diagnosis)  Comment:   Plan:     (M54.81) Bilateral occipital neuralgia  Comment:   Plan:     (M54.12) Cervical radiculopathy  Comment:   Plan:       (G89.29) Chronic intractable pain  Comment:   Plan:           Plan on 05/20/2024:  Increase pregabalin to 75mg TID.    Tens unit: [employer] brochure for patient support provided.    Follow-up in clinic in 6-8 wks.      SHAHRZAD Hernandez, RN, CNP, FNP  [facility]        BILLING TIME DOCUMENTATION:   The total TIME spent on this patient on the date of the encounter/appointment was 60 minutes.

## 2024-05-20 NOTE — NURSING NOTE
Patient presents to the clinic today for a follow up with SHAHRZAD Clark CNP  regarding Pain Management.           2/23/2024     3:53 PM 4/9/2024     1:27 PM 5/20/2024     1:44 PM   PEG Score   PEG Total Score 9.33 9 3            Penny Fierro MA  [facility] [place]

## 2024-05-20 NOTE — PATIENT INSTRUCTIONS
Plan on 05/20/2024:  Increase pregabalin to 75mg TID.    Tens unit: Zynex brochure for patient support provided.    Follow-up in clinic in 6-8 wks.      SHAHRZAD Hernandez, RN, CNP, FNP  [facility]          Clinic Number:  [phone]   Call with any questions about your care and for scheduling assistance.   Calls are returned Monday through Friday between 8 AM and 4:30 PM. We usually get back to you within 2 business days depending on the issue/request.    If we are prescribing your medications:  For opioid medication refills, call the clinic or send a [employer] message 7 days in advance.      We believe regular attendance is key to your success in our program!  Please include:  Name of requested medication  Name of the pharmacy.  For non-opioid medications, call your pharmacy directly to request a refill. Please allow 3-4 days to be processed.   Per [place] State Law:  All controlled substance prescriptions must be filled within 30 days of being written.    For those controlled substances allowing refills, pickup must occur within 30 days of last fill.    Any time you are unable to keep your appointment we ask that you call us at least 24 hours in advance to cancel.This will allow us to offer the appointment time to another patient.   Multiple missed appointments may lead to dismissal from the clinic.

## 2024-05-20 NOTE — PROGRESS NOTES
Patient presents to the clinic today for a visit with SHAHRZAD Clark CNP regarding Pain Management.          UDS/CSA- 08.18.2022 [facility]    Medications- No controlled substances found.    Notes    Alison CALLAHAN [facility] Clinical Assistant

## 2024-05-21 ENCOUNTER — TELEPHONE (OUTPATIENT)
Dept: PULMONOLOGY | Facility: CLINIC | Age: 64
End: 2024-05-21

## 2024-05-21 ENCOUNTER — ANTICOAGULATION THERAPY VISIT (OUTPATIENT)
Dept: ANTICOAGULATION | Facility: CLINIC | Age: 64
End: 2024-05-21

## 2024-05-21 ENCOUNTER — OFFICE VISIT (OUTPATIENT)
Dept: FAMILY MEDICINE | Facility: CLINIC | Age: 64
End: 2024-05-21
Payer: COMMERCIAL

## 2024-05-21 ENCOUNTER — MEDICAL CORRESPONDENCE (OUTPATIENT)
Dept: HEALTH INFORMATION MANAGEMENT | Facility: CLINIC | Age: 64
End: 2024-05-21

## 2024-05-21 VITALS
DIASTOLIC BLOOD PRESSURE: 84 MMHG | OXYGEN SATURATION: 94 % | TEMPERATURE: 98.3 F | BODY MASS INDEX: 47.09 KG/M2 | HEIGHT: 66 IN | HEART RATE: 82 BPM | WEIGHT: 293 LBS | SYSTOLIC BLOOD PRESSURE: 137 MMHG | RESPIRATION RATE: 16 BRPM

## 2024-05-21 DIAGNOSIS — F17.210 CIGARETTE NICOTINE DEPENDENCE: ICD-10-CM

## 2024-05-21 DIAGNOSIS — I26.99 PULMONARY EMBOLISM WITH INFARCTION (H): Primary | ICD-10-CM

## 2024-05-21 DIAGNOSIS — Z79.01 LONG TERM (CURRENT) USE OF ANTICOAGULANTS: ICD-10-CM

## 2024-05-21 DIAGNOSIS — G40.909 NONINTRACTABLE EPILEPSY WITHOUT STATUS EPILEPTICUS, UNSPECIFIED EPILEPSY TYPE (H): Primary | ICD-10-CM

## 2024-05-21 DIAGNOSIS — Z86.0100 HISTORY OF COLONIC POLYPS: ICD-10-CM

## 2024-05-21 DIAGNOSIS — G43.109 MIGRAINE WITH AURA AND WITHOUT STATUS MIGRAINOSUS, NOT INTRACTABLE: ICD-10-CM

## 2024-05-21 DIAGNOSIS — R15.9 INCONTINENCE OF FECES, UNSPECIFIED FECAL INCONTINENCE TYPE: ICD-10-CM

## 2024-05-21 DIAGNOSIS — R53.81 PHYSICAL DECONDITIONING: ICD-10-CM

## 2024-05-21 DIAGNOSIS — G62.9 PERIPHERAL POLYNEUROPATHY: ICD-10-CM

## 2024-05-21 DIAGNOSIS — I10 ESSENTIAL HYPERTENSION: ICD-10-CM

## 2024-05-21 DIAGNOSIS — M80.08XD FRACTURE OF VERTEBRA DUE TO OSTEOPOROSIS WITH ROUTINE HEALING, SUBSEQUENT ENCOUNTER: ICD-10-CM

## 2024-05-21 DIAGNOSIS — Z86.711 HISTORY OF PULMONARY EMBOLISM: ICD-10-CM

## 2024-05-21 PROBLEM — H25.9 AGE-RELATED CATARACT OF BOTH EYES, UNSPECIFIED AGE-RELATED CATARACT TYPE: Status: RESOLVED | Noted: 2024-03-27 | Resolved: 2024-05-21

## 2024-05-21 LAB — INR (EXTERNAL): 2.4 (ref 0.9–1.1)

## 2024-05-21 PROCEDURE — 99214 OFFICE O/P EST MOD 30 MIN: CPT | Performed by: FAMILY MEDICINE

## 2024-05-21 PROCEDURE — G2211 COMPLEX E/M VISIT ADD ON: HCPCS | Performed by: FAMILY MEDICINE

## 2024-05-21 RX ORDER — VARENICLINE TARTRATE 1 MG/1
1 TABLET, FILM COATED ORAL 2 TIMES DAILY
Qty: 56 TABLET | Refills: 0 | Status: SHIPPED | OUTPATIENT
Start: 2024-05-21 | End: 2024-06-17

## 2024-05-21 RX ORDER — VIBEGRON 75 MG/1
TABLET, FILM COATED ORAL
COMMUNITY
Start: 2024-05-20

## 2024-05-21 RX ORDER — SUMATRIPTAN 50 MG/1
50 TABLET, FILM COATED ORAL
Qty: 12 TABLET | Refills: 3 | Status: SHIPPED | OUTPATIENT
Start: 2024-05-21

## 2024-05-21 NOTE — ASSESSMENT & PLAN NOTE
The patient underwent colonoscopy earlier this month.  She had multiple colon polyps and needs to go back for repeat colonoscopy in 3 months.  She really struggled with the prep.  She subsequently struggled to recover from the prep with symptoms of fecal incontinence.  Her mobility is impaired and hygiene is actually quite difficult for this individual.  Given the fecal incontinence that she described she needs adult diapers/depends type products.  DME for limited supply submitted to her insurance.  She will use this during her prep and for the week after her prep.

## 2024-05-21 NOTE — PROGRESS NOTES
ANTICOAGULATION MANAGEMENT     Valentina Olmedo 64 year old female is on warfarin with therapeutic INR result. (Goal INR 2.0-3.0)    Recent labs: (last 7 days)     05/21/24  0908   INR 2.4*       ASSESSMENT     Source(s): Chart Review and Home Care/Facility Nurse     Warfarin doses taken: Warfarin taken as instructed  Diet: No new diet changes identified  Medication/supplement changes: Lyrica dose will be increased- patient has not started this yet   New illness, injury, or hospitalization: No  Signs or symptoms of bleeding or clotting: No  Previous result: Therapeutic last visit; previously outside of goal range  Additional findings: None       PLAN     Recommended plan for no diet, medication or health factor changes affecting INR     Dosing Instructions: Continue your current warfarin dose with next INR in 2 weeks       Summary  As of 5/21/2024      Full warfarin instructions:  10 mg every Sun, Tue, Thu; 7.5 mg all other days   Next INR check:  6/5/2024               Telephone call with Katelyn home care nurse who verbalizes understanding and agrees to plan    Orders given to  Homecare nurse/facility to recheck    Education provided:   Please call back if any changes to your diet, medications or how you've been taking warfarin  Contact [phone]  with any changes, questions or concerns.     Plan made per [facility] anticoagulation protocol    Zari Cheng RN  Anticoagulation Clinic  5/21/2024    _______________________________________________________________________     Anticoagulation Episode Summary       Current INR goal:  2.0-3.0   TTR:  52.3% (1 y)   Target end date:  Indefinite   Send INR reminders to:  [facility]    Indications    History of pulmonary embolism (Resolved) [Z86.711]  Pulmonary embolism with infarction (H) [I26.99]  Long term (current) use of anticoagulants [Z79.01]  History of pulmonary embolism [Z86.711]             Comments:               Anticoagulation Care Providers       Provider Role  Specialty Phone number    Promise Vanegas MD Referring Family Medicine [phone]    Donald Rooney MD Referring Internal Medicine [phone]    Jose Maria Morin MD Referring Family Medicine [phone]

## 2024-05-21 NOTE — ASSESSMENT & PLAN NOTE
Patient has a history of epilepsy, osteoporosis, physical deconditioning, fracture of the vertebrae and request replacement for walker and wheelchair.  She says that her current DME are over 5 years old and are no longer functioning.  She is concerned they might break.  She has been working to improve her overall exercise capacity with a deliberate effort to walk.  She walked in the clinic today (this is the first time of seeing her walking since I have met her).  This is encouraging.  I think it is reasonable to work to obtain new order supplies.  DME orders placed to replace her current DME.

## 2024-05-21 NOTE — ASSESSMENT & PLAN NOTE
Recent recurrence of migraine headaches.  She was taken off of topiramate earlier this year because of brain fog/word finding symptoms.  She has had a couple of migraine headaches.  We discussed that thoughtful limited use of Imitrex is appropriate.  Refill sent to pharmacy.

## 2024-05-21 NOTE — TELEPHONE ENCOUNTER
[facility] Call Center    Phone Message    May a detailed message be left on voicemail: yes     Reason for Call: Medication Refill Request    Has the patient contacted the pharmacy for the refill? Yes   Name of medication being requested: Varenicline  Provider who prescribed the medication: Shawnee Balderas  Pharmacy: [facility]  Date medication is needed: ASAP     This refill was initially requested several weeks ago, but pharmacy has not heard back yet, they are hoping this refill can be sent in right away since there will likely be a delay with the Memorial day weekend coming up.     Action Taken: Other: Pulm    Travel Screening: Not Applicable

## 2024-05-21 NOTE — PROGRESS NOTES
Assessment & Plan   Problem List Items Addressed This Visit       Essential hypertension    Fracture of vertebra due to osteoporosis with routine healing, subsequent encounter    Relevant Orders    Wheelchair Order for DME - ONLY FOR DME    Walker Order for DME - ONLY FOR DME    History of colonic polyps     The patient underwent colonoscopy earlier this month.  She had multiple colon polyps and needs to go back for repeat colonoscopy in 3 months.  She really struggled with the prep.  She subsequently struggled to recover from the prep with symptoms of fecal incontinence.  Her mobility is impaired and hygiene is actually quite difficult for this individual.  Given the fecal incontinence that she described she needs adult diapers/depends type products.  DME for limited supply submitted to her insurance.  She will use this during her prep and for the week after her prep.         Relevant Orders    Incontinence Supplies Order for DME - ONLY FOR DME    Migraine headache     Recent recurrence of migraine headaches.  She was taken off of topiramate earlier this year because of brain fog/word finding symptoms.  She has had a couple of migraine headaches.  We discussed that thoughtful limited use of Imitrex is appropriate.  Refill sent to pharmacy.         Relevant Medications    SUMAtriptan (IMITREX) 50 MG tablet    Nonintractable epilepsy without status epilepticus (H) - Primary     Patient has a history of epilepsy, osteoporosis, physical deconditioning, fracture of the vertebrae and request replacement for walker and wheelchair.  She says that her current DME are over 5 years old and are no longer functioning.  She is concerned they might break.  She has been working to improve her overall exercise capacity with a deliberate effort to walk.  She walked in the clinic today (this is the first time of seeing her walking since I have met her).  This is encouraging.  I think it is reasonable to work to obtain new order  supplies.  DME orders placed to replace her current DME.         Relevant Medications    SUMAtriptan (IMITREX) 50 MG tablet    Other Relevant Orders    Wheelchair Order for DME - ONLY FOR DME    Walker Order for DME - ONLY FOR DME    Peripheral polyneuropathy    Relevant Orders    Wheelchair Order for DME - ONLY FOR DME    Walker Order for DME - ONLY FOR DME    Physical deconditioning    Relevant Orders    Wheelchair Order for DME - ONLY FOR DME    Walker Order for DME - ONLY FOR DME     Other Visit Diagnoses       Incontinence of feces, unspecified fecal incontinence type               35 minutes spent by me on the date of the encounter doing chart review, history and exam, documentation and further activities per the note    The longitudinal plan of care for the diagnosis(es)/condition(s) as documented were addressed during this visit. Due to the added complexity in care, I will continue to support Valentina in the subsequent management and with ongoing continuity of care.    Subjective   Valentina is a 64 year old, presenting for the following health issues:  RECHECK        5/21/2024    12:00 PM   Additional Questions   Roomed by xl   Accompanied by spouse     Mobility:   - trying to walk more.  Working ot increase her mobility.    - wheelchair is 5+ years old. Walker is older    S/p upper and lower endoscopy: doing a bit better.  Planned recheck in ~3 months for colon cancer given polyp burden. She struggles with the prep and recovery.  This has made her wonder if she needs adult diaper product.      Struggling with sense of complete voiding of bladder. Initial void is normal.  She does not feel like she completely voids. She then needs to go back and urinate. This is new since her colonoscopy. BMs have continued to loose and formed. Normal caliber.      Headache: just started today in clinic.  2nd this month.  She has some nausea.      History of Present Illness       Reason for visit:  I forgot but i want to see about my  "wheelchair and possibly a walker    She eats 4 or more servings of fruits and vegetables daily.She consumes 2 sweetened beverage(s) daily.She exercises with enough effort to increase her heart rate 10 to 19 minutes per day.  She exercises with enough effort to increase her heart rate 7 days per week.   She is taking medications regularly.           Objective    /84 (BP Location: Left arm, Patient Position: Sitting, Cuff Size: Adult Large)   Pulse 82   Temp 98.3  F (36.8  C) (Oral)   Resp 16   Ht 1.676 m (5' 6\")   Wt (!) 160.2 kg (353 lb 3.2 oz)   LMP  (LMP Unknown)   SpO2 94%   BMI 57.01 kg/m    Body mass index is 57.01 kg/m .  Physical Exam  Vitals reviewed.   Constitutional:       General: She is not in acute distress.     Appearance: Normal appearance. She is not ill-appearing.   HENT:      Head: Normocephalic and atraumatic.      Right Ear: External ear normal.      Left Ear: External ear normal.      Nose: Nose normal.      Mouth/Throat:      Pharynx: Oropharynx is clear. No oropharyngeal exudate or posterior oropharyngeal erythema.   Eyes:      General: No scleral icterus.        Right eye: No discharge.         Left eye: No discharge.      Extraocular Movements: Extraocular movements intact.      Conjunctiva/sclera: Conjunctivae normal.      Pupils: Pupils are equal, round, and reactive to light.   Neck:      Comments: No thyromegaly.  Cardiovascular:      Rate and Rhythm: Normal rate and regular rhythm.      Heart sounds: Normal heart sounds. No murmur heard.     No friction rub. No gallop.   Pulmonary:      Effort: Pulmonary effort is normal. No respiratory distress.      Breath sounds: Normal breath sounds. No wheezing or rales.   Abdominal:      General: There is no distension.      Palpations: Abdomen is soft. There is no mass.      Tenderness: There is no abdominal tenderness.   Musculoskeletal:         General: No signs of injury. Normal range of motion.      Cervical back: Normal range " of motion.      Right lower leg: No edema.      Left lower leg: No edema.   Lymphadenopathy:      Cervical: No cervical adenopathy.   Skin:     General: Skin is warm.      Coloration: Skin is not jaundiced.      Findings: No rash.   Neurological:      General: No focal deficit present.      Mental Status: She is alert and oriented to person, place, and time.      Cranial Nerves: No cranial nerve deficit.      Deep Tendon Reflexes: Reflexes normal.   Psychiatric:         Mood and Affect: Mood normal.                    Signed Electronically by: Jose Maria Morin MD

## 2024-05-22 ENCOUNTER — HOSPITAL ENCOUNTER (OUTPATIENT)
Dept: CARDIAC REHAB | Facility: HOSPITAL | Age: 64
Discharge: HOME | End: 2024-05-22
Attending: INTERNAL MEDICINE
Payer: COMMERCIAL

## 2024-05-22 PROCEDURE — G0238 OTH RESP PROC, INDIV: HCPCS

## 2024-05-28 ENCOUNTER — HOSPITAL ENCOUNTER (OUTPATIENT)
Dept: CARDIAC REHAB | Facility: HOSPITAL | Age: 64
Discharge: HOME | End: 2024-05-28
Attending: INTERNAL MEDICINE
Payer: COMMERCIAL

## 2024-05-28 PROCEDURE — G0239 OTH RESP PROC, GROUP: HCPCS

## 2024-05-30 ENCOUNTER — APPOINTMENT (OUTPATIENT)
Dept: RADIOLOGY | Facility: HOSPITAL | Age: 64
End: 2024-05-30
Attending: EMERGENCY MEDICINE
Payer: COMMERCIAL

## 2024-05-30 ENCOUNTER — HOSPITAL ENCOUNTER (EMERGENCY)
Facility: HOSPITAL | Age: 64
Discharge: HOME | End: 2024-05-30
Attending: EMERGENCY MEDICINE | Admitting: EMERGENCY MEDICINE
Payer: COMMERCIAL

## 2024-05-30 ENCOUNTER — HOSPITAL ENCOUNTER (OUTPATIENT)
Dept: CARDIAC REHAB | Facility: HOSPITAL | Age: 64
Discharge: HOME | End: 2024-05-30
Attending: INTERNAL MEDICINE
Payer: COMMERCIAL

## 2024-05-30 ENCOUNTER — APPOINTMENT (OUTPATIENT)
Dept: CT IMAGING | Facility: HOSPITAL | Age: 64
End: 2024-05-30
Attending: EMERGENCY MEDICINE
Payer: COMMERCIAL

## 2024-05-30 VITALS
SYSTOLIC BLOOD PRESSURE: 138 MMHG | WEIGHT: 293 LBS | OXYGEN SATURATION: 95 % | DIASTOLIC BLOOD PRESSURE: 83 MMHG | RESPIRATION RATE: 18 BRPM | HEART RATE: 94 BPM | HEIGHT: 66 IN | TEMPERATURE: 98.1 F | BODY MASS INDEX: 47.09 KG/M2

## 2024-05-30 DIAGNOSIS — I10 ACCELERATED HYPERTENSION: ICD-10-CM

## 2024-05-30 DIAGNOSIS — N39.0 URINARY TRACT INFECTION WITHOUT HEMATURIA, SITE UNSPECIFIED: ICD-10-CM

## 2024-05-30 DIAGNOSIS — R51.9 NONINTRACTABLE HEADACHE, UNSPECIFIED CHRONICITY PATTERN, UNSPECIFIED HEADACHE TYPE: ICD-10-CM

## 2024-05-30 LAB
ALBUMIN UR-MCNC: NEGATIVE MG/DL
ANION GAP SERPL CALCULATED.3IONS-SCNC: 8 MMOL/L (ref 7–15)
APPEARANCE UR: CLEAR
BASOPHILS # BLD AUTO: 0 10E3/UL (ref 0–0.2)
BASOPHILS NFR BLD AUTO: 1 %
BILIRUB UR QL STRIP: NEGATIVE
BUN SERPL-MCNC: 9.8 MG/DL (ref 8–23)
CALCIUM SERPL-MCNC: 8.7 MG/DL (ref 8.8–10.2)
CHLORIDE SERPL-SCNC: 105 MMOL/L (ref 98–107)
COLOR UR AUTO: YELLOW
CREAT SERPL-MCNC: 0.85 MG/DL (ref 0.51–0.95)
DEPRECATED HCO3 PLAS-SCNC: 25 MMOL/L (ref 22–29)
EGFRCR SERPLBLD CKD-EPI 2021: 77 ML/MIN/1.73M2
EOSINOPHIL # BLD AUTO: 0.2 10E3/UL (ref 0–0.7)
EOSINOPHIL NFR BLD AUTO: 4 %
ERYTHROCYTE [DISTWIDTH] IN BLOOD BY AUTOMATED COUNT: 13.8 % (ref 10–15)
GLUCOSE SERPL-MCNC: 100 MG/DL (ref 70–99)
GLUCOSE UR STRIP-MCNC: NEGATIVE MG/DL
HCT VFR BLD AUTO: 40 % (ref 35–47)
HGB BLD-MCNC: 13.1 G/DL (ref 11.7–15.7)
HGB UR QL STRIP: NEGATIVE
IMM GRANULOCYTES # BLD: 0 10E3/UL
IMM GRANULOCYTES NFR BLD: 0 %
KETONES UR STRIP-MCNC: NEGATIVE MG/DL
LEUKOCYTE ESTERASE UR QL STRIP: ABNORMAL
LYMPHOCYTES # BLD AUTO: 1.3 10E3/UL (ref 0.8–5.3)
LYMPHOCYTES NFR BLD AUTO: 28 %
MAGNESIUM SERPL-MCNC: 2 MG/DL (ref 1.7–2.3)
MCH RBC QN AUTO: 26.8 PG (ref 26.5–33)
MCHC RBC AUTO-ENTMCNC: 32.8 G/DL (ref 31.5–36.5)
MCV RBC AUTO: 82 FL (ref 78–100)
MONOCYTES # BLD AUTO: 0.3 10E3/UL (ref 0–1.3)
MONOCYTES NFR BLD AUTO: 7 %
MUCOUS THREADS #/AREA URNS LPF: PRESENT /LPF
NEUTROPHILS # BLD AUTO: 2.7 10E3/UL (ref 1.6–8.3)
NEUTROPHILS NFR BLD AUTO: 60 %
NITRATE UR QL: NEGATIVE
NRBC # BLD AUTO: 0 10E3/UL
NRBC BLD AUTO-RTO: 0 /100
NT-PROBNP SERPL-MCNC: 246 PG/ML (ref 0–900)
PH UR STRIP: 7 [PH] (ref 5–7)
PLATELET # BLD AUTO: 229 10E3/UL (ref 150–450)
POTASSIUM SERPL-SCNC: 4.5 MMOL/L (ref 3.4–5.3)
RBC # BLD AUTO: 4.89 10E6/UL (ref 3.8–5.2)
RBC URINE: 1 /HPF
SODIUM SERPL-SCNC: 138 MMOL/L (ref 135–145)
SP GR UR STRIP: 1.02 (ref 1–1.03)
SQUAMOUS EPITHELIAL: 1 /HPF
TRANSITIONAL EPI: <1 /HPF
TROPONIN T SERPL HS-MCNC: 10 NG/L
UROBILINOGEN UR STRIP-MCNC: <2 MG/DL
WBC # BLD AUTO: 4.5 10E3/UL (ref 4–11)
WBC URINE: 22 /HPF

## 2024-05-30 PROCEDURE — 36415 COLL VENOUS BLD VENIPUNCTURE: CPT | Performed by: EMERGENCY MEDICINE

## 2024-05-30 PROCEDURE — 71046 X-RAY EXAM CHEST 2 VIEWS: CPT

## 2024-05-30 PROCEDURE — 80048 BASIC METABOLIC PNL TOTAL CA: CPT | Performed by: EMERGENCY MEDICINE

## 2024-05-30 PROCEDURE — 87086 URINE CULTURE/COLONY COUNT: CPT | Performed by: EMERGENCY MEDICINE

## 2024-05-30 PROCEDURE — 81001 URINALYSIS AUTO W/SCOPE: CPT | Performed by: EMERGENCY MEDICINE

## 2024-05-30 PROCEDURE — 99285 EMERGENCY DEPT VISIT HI MDM: CPT | Mod: 25

## 2024-05-30 PROCEDURE — 83880 ASSAY OF NATRIURETIC PEPTIDE: CPT | Performed by: EMERGENCY MEDICINE

## 2024-05-30 PROCEDURE — 93005 ELECTROCARDIOGRAM TRACING: CPT | Performed by: EMERGENCY MEDICINE

## 2024-05-30 PROCEDURE — 87186 SC STD MICRODIL/AGAR DIL: CPT | Performed by: EMERGENCY MEDICINE

## 2024-05-30 PROCEDURE — 999N000104 HC STATISTIC NO CHARGE

## 2024-05-30 PROCEDURE — 84484 ASSAY OF TROPONIN QUANT: CPT | Performed by: EMERGENCY MEDICINE

## 2024-05-30 PROCEDURE — 83735 ASSAY OF MAGNESIUM: CPT | Performed by: EMERGENCY MEDICINE

## 2024-05-30 PROCEDURE — 70450 CT HEAD/BRAIN W/O DYE: CPT

## 2024-05-30 PROCEDURE — 85025 COMPLETE CBC W/AUTO DIFF WBC: CPT | Performed by: EMERGENCY MEDICINE

## 2024-05-30 RX ORDER — CEFDINIR 300 MG/1
300 CAPSULE ORAL 2 TIMES DAILY
Qty: 14 CAPSULE | Refills: 0 | Status: SHIPPED | OUTPATIENT
Start: 2024-05-30 | End: 2024-06-06

## 2024-05-30 ASSESSMENT — COLUMBIA-SUICIDE SEVERITY RATING SCALE - C-SSRS
2. HAVE YOU ACTUALLY HAD ANY THOUGHTS OF KILLING YOURSELF IN THE PAST MONTH?: NO
6. HAVE YOU EVER DONE ANYTHING, STARTED TO DO ANYTHING, OR PREPARED TO DO ANYTHING TO END YOUR LIFE?: YES
1. IN THE PAST MONTH, HAVE YOU WISHED YOU WERE DEAD OR WISHED YOU COULD GO TO SLEEP AND NOT WAKE UP?: NO

## 2024-05-30 ASSESSMENT — ACTIVITIES OF DAILY LIVING (ADL)
ADLS_ACUITY_SCORE: 33

## 2024-05-30 NOTE — DISCHARGE INSTRUCTIONS
Please follow-up with your Primary Care Provider within 3-4 days; call to arrange appointment.      Return to the ER for worsening symptoms, sudden onset or severe headache, focal neurologic deficit (for example, facial droop or right arm weakness), if you have chest pain, shortness of breath, abdominal pain, flank / back pain, persistent nausea / vomiting, if you are unable to empty your bladder, fever or other concerns.     Complete the full course of antibiotics, unless otherwise guided by the results of your urine culture.

## 2024-05-30 NOTE — ED TRIAGE NOTES
Patient was at pulmonary rehab where they found patient to be hypertensive,  then 163/110. Was told she could not do exercises due to high BP and told to be seen in ER. Patient reports generalized weakness and mild head ache that patient felt upon waking. Has not taken anything for pain. Losartan taken today for BP. No CP/SOB

## 2024-05-30 NOTE — ED PROVIDER NOTES
Emergency Department Encounter     Evaluation Date & Time:   No admission date for patient encounter.    CHIEF COMPLAINT:  Hypertension and Generalized Weakness      Triage Note:Patient was at pulmonary rehab where they found patient to be hypertensive,  then 163/110. Was told she could not do exercises due to high BP and told to be seen in ER. Patient reports generalized weakness and mild head ache that patient felt upon waking. Has not taken anything for pain. Losartan taken today for BP. No CP/SOB      Impression and Plan       FINAL IMPRESSION:    ICD-10-CM    1. Accelerated hypertension  I10       2. Nonintractable headache, unspecified chronicity pattern, unspecified headache type  R51.9       3. Urinary tract infection without hematuria, site unspecified  N39.0             ED COURSE & MEDICAL DECISION MAKIN year old female, history of PE (on coumadin), HTN, HLD, morbid obesity, epilepsy, migraine headaches, polyneuropathy, COPD, CHRIS, schizoaffective disorder and PTSD, who presents on referral from her pulmonary / cardiac rehab appointment to the ED for evaluation of hypertension. Her BP was elevated to 190s systolic and 160s on recheck.     Patient has been compliant with her Losartan and took it this morning as prescribed. She reports mild frontal headache without associated vision changes, extremity weakness / acute paresthesias. She reports chronic shortness of breath for the past 3-4 months and denies chest pain.     On presentation,  / 88.    EKG performed and demonstrated NSR with poor R wave progression and no ST-T wave elevation consistent with ACS. Troponin WNL (10). I do not suspect ACS and do not think serial troponin testing is indicated.    CXR performed and demonstrated well-inflated lungs, which may indicate obstructive lung disease. No infiltrate or pleural effusion. Normal heart size with no pulmonary vascular congestion.     No clinical, radiographic or laboratory (BNP  246) evidence of acute CHF.    Neuro exam is normal, however given that patient is on coumadin with elevated BP and headache, imaging pursued.    Head CT with no acute intracranial process.     Labs otherwise remarkable for no leukocytosis, anemia, significant electrolyte derangements or renal impairment.    INR ordered, however this was not drawn.  On chart review, her INR was therapeutic at 2.4 ~9 days ago.    UA suggestive of infection with 250 LE and 22 WBCs. Patient denies urinary symptoms and we discussed the option of holding on antibiotics pending urine culture results vs starting empiric antibiotics - patient would like to initiate treatment pending urine culture.     Repeat blood pressure improved (138 / 83) without intervention. I do not think admission is indicated. Patient discharged to home with follow-up with her PCP. She was given a prescription for cefdinir (advised to have her INR rechecked as they could be interaction). Return precautions provided. Patient stable throughout ED course.       At the conclusion of the encounter I discussed the results of all the tests and the disposition. The questions were answered. The patient acknowledged understanding and was agreeable with the care plan.        Medical Decision Making    History:  Obtained supplemental history:Supplemental history obtained?: No  Reviewed external records: External records reviewed?: Documented in chart    External consultation:  Did you consider but not order tests?: Work up considered but not performed and documented in chart, if applicable  Did you interpret images independently?: Independent interpretation of ECG and images noted in documentation, when applicable.  Consultation discussion with other provider:Did you involve another provider (consultant, MH, pharmacy, etc.)?: No    Complicating factors:  Care impacted by chronic illness:Anticoagulated State, Chronic Lung Disease, Hyperlipidemia, Hypertension, Mental Health,  and Other: pulmonary embolism, epilepsy, obesity  Care significantly affected by social determinants of health:N/A    Disposition considerations: Discharge. I prescribed additional prescription strength medication(s) as charted. I considered admission, but ultimately discharged patient given reassuring evaluation and clinical improvement.    MEDICATIONS GIVEN IN THE EMERGENCY DEPARTMENT:  Medications - No data to display    NEW PRESCRIPTIONS STARTED AT TODAY'S ED VISIT:  New Prescriptions    CEFDINIR (OMNICEF) 300 MG CAPSULE    Take 1 capsule (300 mg) by mouth 2 times daily for 7 days       HPI     HPI     Valentina Olmedo is a 64 year old female, history of PE (on coumadin), HTN, HLD, morbid obesity, epilepsy, migraine headaches, polyneuropathy, COPD, CHRIS, schizoaffective disorder and PTSD, who presents to this ED for evaluation of hypertension.    Patient reports that she was at her pulmonary / cardiac appointment for exercises where they found her blood pressure to be elevated (193 systolic) and on recheck was 163 / 110. They would not allow her to do the exercises and referred her to the ED for further evaluation.    Patient reports that she has been compliant with her Losartan and took it this morning as prescribed. She reports mild frontal headache. She denies associated vision changes, extremity weakness / acute paresthesias. She reports chronic shortness of breath for the past 3-4 months and denies chest pain.     She has otherwise been in her usual state of health and denies abdominal pain, nausea / vomiting, diarrhea, cough, fever or other concerns.     REVIEW OF SYSTEMS:  All other systems reviewed and are negative.      Medical History     Past Medical History:   Diagnosis Date    Adrenal mass (H24) 10/12/2015    Age-related cataract of both eyes, unspecified age-related cataract type 03/27/2024    Anxiety     Anxiety     Arthritis     Borderline personality disorder (H)     Cancer (H)     COPD (chronic  obstructive pulmonary disease) (H)     Depression     Depressive disorder     History of COVID-19     Hyperlipidemia     Hypertension     Hypertension     Hyponatremia     Hypothyroidism     Malignant neoplasm of thyroid gland (H)     Primary hyperparathyroidism (H24)     PTSD (post-traumatic stress disorder)     Pulmonary embolism with infarction (H) 01/12/2021    Recurrent otitis media     Seizure disorder (H)     Sleep apnea     Stroke (H)     Tobacco abuse     Vertigo        Past Surgical History:   Procedure Laterality Date    ABDOMEN SURGERY      ADRENALECTOMY Left     BRAIN SURGERY      COLONOSCOPY N/A 5/7/2024    Procedure: WITH COLONOSCOPY with polypectomies;  Surgeon: Walt Lira MD;  Location: [facility] OR    CRANIOTOMY FOR TEMPORAL LOBECTOMY Right     x3 for seizure    DILATION AND CURETTAGE      ESOPHAGOSCOPY, GASTROSCOPY, DUODENOSCOPY (EGD), COMBINED N/A 5/7/2024    Procedure: and biopsies;  Surgeon: Walt Lira MD;  Location: [facility] OR    ESOPHAGOSCOPY, GASTROSCOPY, DUODENOSCOPY (EGD), DILATATION, COMBINED N/A 5/7/2024    Procedure: ESOPHAGOGASTRODUODENOSCOPY with esophageal dilation;  Surgeon: Walt Lira MD;  Location: [facility] Main OR    HEAD & NECK SURGERY      HYSTERECTOMY, PAP NO LONGER INDICATED N/A     LITHOTRIPSY      PARATHYROID GLAND SURGERY      resection of adenoma    REFRACTIVE SURGERY Bilateral     RELEASE CARPAL TUNNEL Bilateral     TONSILLECTOMY & ADENOIDECTOMY      TOTAL THYROIDECTOMY      TOTAL VAGINAL HYSTERECTOMY      39 years old. Benign       Family History   Problem Relation Age of Onset    Chronic Obstructive Pulmonary Disease Mother     Other - See Comments Father         estranged    Skin Cancer Father     Lung Cancer Maternal Grandfather 76    Other - See Comments Brother         Missing since 1992    Alcoholism Brother     Diabetes Sister     Depression Sister     Alcoholism Sister     No Known Problems Sister         Half sister       Social  History     Tobacco Use    Smoking status: Former     Types: Cigarettes     Passive exposure: Never    Smokeless tobacco: Never    Tobacco comments:     Quit about 5 months ago on Chantix.     Vaping Use    Vaping status: Never Used   Substance Use Topics    Alcohol use: No     Comment: None.    Drug use: No       cefdinir (OMNICEF) 300 MG capsule  albuterol (PROAIR HFA) 108 (90 Base) MCG/ACT inhaler  ARIPiprazole (ABILIFY) 10 MG tablet  Brivaracetam (BRIVIACT) 100 MG tablet  carBAMazepine (TEGRETOL) 200 MG tablet  Cyanocobalamin (VITAMIN B12) 1000 MCG TBCR  denosumab (PROLIA) 60 MG/ML SOSY injection  DULoxetine (CYMBALTA) 60 MG capsule  EPINEPHrine (ANY BX GENERIC EQUIV) 0.3 MG/0.3ML injection 2-pack  esomeprazole (NEXIUM) 40 MG DR capsule  fexofenadine (ALLEGRA) 180 MG tablet  fluticasone (FLONASE) 50 MCG/ACT nasal spray  GAVILYTE-G 236 g suspension  GEMTESA 75 MG TABS tablet  levothyroxine (SYNTHROID/LEVOTHROID) 300 MCG tablet  losartan (COZAAR) 25 MG tablet  melatonin 3 MG tablet  multivitamin with iron (CHROMAGEN) TABS half-tab  Nutritional Supplements (NUTRITIONAL SHAKE HIGH PROTEIN) LIQD  nystatin (MYCOSTATIN) 158640 UNIT/GM external cream  polyethylene glycol (MIRALAX) 17 GM/Dose powder  polymixin b-trimethoprim (POLYTRIM) 98757-8.1 UNIT/ML-% ophthalmic solution  prazosin (MINIPRESS) 2 MG capsule  prednisoLONE acetate (PRED FORTE) 1 % ophthalmic suspension  pregabalin (LYRICA) 75 MG capsule  QUEtiapine (SEROQUEL) 25 MG tablet  rosuvastatin (CRESTOR) 20 MG tablet  salmeterol (SEREVENT DISKUS) 50 MCG/ACT inhaler  senna (SENNA-TIME) 8.6 MG tablet  SUMAtriptan (IMITREX) 50 MG tablet  umeclidinium (INCRUSE ELLIPTA) 62.5 MCG/ACT inhaler  varenicline (CHANTIX) 1 MG tablet  vitamin D3 (CHOLECALCIFEROL) 50 mcg (2000 units) tablet  warfarin ANTICOAGULANT (COUMADIN) 5 MG tablet  zolpidem (AMBIEN) 5 MG tablet        Physical Exam     First Vitals:  Patient Vitals for the past 24 hrs:   BP Temp Temp src Pulse Resp SpO2  "Height Weight   05/30/24 1740 138/83 98.1  F (36.7  C) Oral -- 18 95 % -- --   05/30/24 1457 (!) 159/88 98.2  F (36.8  C) Oral 94 22 96 % 1.676 m (5' 6\") (!) 165.1 kg (364 lb)       PHYSICAL EXAM:   Physical Exam    GENERAL: Awake, alert.  In no acute distress.   HEENT: Normocephalic, atraumatic. Pupils equal, round and reactive. Conjunctiva normal. EOMI without nystagmus. Normal posterior oropharynx. Tongue is midline.   NECK: No stridor.  PULMONARY: Symmetrical breath sounds without distress.  Lungs clear to auscultation bilaterally without wheezes, rhonchi or rales.  CARDIO: Regular rate and rhythm.  No significant murmur, rub or gallop.    ABDOMINAL: Abdomen soft, non-distended and non-tender to palpation.    EXTREMITIES: No lower extremity swelling or edema.      NEURO: Alert and oriented to person, place and time.  Patient reports chronic decreased sensation to light touch left face; cranial nerves III-XII otherwise intact.  Strength 5/5 BL upper and lower extremities with sensation to light touch grossly intact. Normal gait with walker.  PSYCH: Normal mood and affect.      Results     LAB:  All pertinent labs reviewed and interpreted  Labs Ordered and Resulted from Time of ED Arrival to Time of ED Departure   BASIC METABOLIC PANEL - Abnormal       Result Value    Sodium 138      Potassium 4.5      Chloride 105      Carbon Dioxide (CO2) 25      Anion Gap 8      Urea Nitrogen 9.8      Creatinine 0.85      GFR Estimate 77      Calcium 8.7 (*)     Glucose 100 (*)    ROUTINE UA WITH MICROSCOPIC REFLEX TO CULTURE - Abnormal    Color Urine Yellow      Appearance Urine Clear      Glucose Urine Negative      Bilirubin Urine Negative      Ketones Urine Negative      Specific Gravity Urine 1.021      Blood Urine Negative      pH Urine 7.0      Protein Albumin Urine Negative      Urobilinogen Urine <2.0      Nitrite Urine Negative      Leukocyte Esterase Urine 250 Rosio/uL (*)     Mucus Urine Present (*)     RBC Urine 1   "    WBC Urine 22 (*)     Squamous Epithelials Urine 1      Transitional Epithelials Urine <1     MAGNESIUM - Normal    Magnesium 2.0     CBC WITH PLATELETS AND DIFFERENTIAL    WBC Count 4.5      RBC Count 4.89      Hemoglobin 13.1      Hematocrit 40.0      MCV 82      MCH 26.8      MCHC 32.8      RDW 13.8      Platelet Count 229      % Neutrophils 60      % Lymphocytes 28      % Monocytes 7      % Eosinophils 4      % Basophils 1      % Immature Granulocytes 0      NRBCs per 100 WBC 0      Absolute Neutrophils 2.7      Absolute Lymphocytes 1.3      Absolute Monocytes 0.3      Absolute Eosinophils 0.2      Absolute Basophils 0.0      Absolute Immature Granulocytes 0.0      Absolute NRBCs 0.0     TROPONIN T, HIGH SENSITIVITY   NT PROBNP INPATIENT   URINE CULTURE       RADIOLOGY:  Chest XR,  PA & LAT   Final Result   IMPRESSION: The lungs are well-inflated which may indicate obstructive lung disease. No infiltrate or pleural effusion. The heart is normal in size. No pulmonary vascular congestion. There is a thoracic kyphosis with moderate degenerative change.      Head CT w/o contrast   Final Result   IMPRESSION:   1.  No acute intracranial process.      2.  Chronic intracranial changes described above.      3.  Right maxillary sinus small air-fluid level. Please correlate for acute sinusitis.        EC2024, 15:44; NSR with rate of 76 bpm; normal intervals; normal conduction; poor R wave progression with no ST-T wave changes consistent with ACS or pericarditis; compared to previous EKG dated 2024, the WI interval has shortened, anterior T wave inversions have improved    EKG independently reviewed and interpreted by MD Anny Schneider MD  Emergency Medicine  [facility] EMERGENCY DEPARTMENT           Anny Lance MD  [phone]

## 2024-05-31 ENCOUNTER — DOCUMENTATION ONLY (OUTPATIENT)
Dept: ANTICOAGULATION | Facility: CLINIC | Age: 64
End: 2024-05-31
Payer: COMMERCIAL

## 2024-05-31 ENCOUNTER — MEDICAL CORRESPONDENCE (OUTPATIENT)
Dept: HEALTH INFORMATION MANAGEMENT | Facility: CLINIC | Age: 64
End: 2024-05-31
Payer: COMMERCIAL

## 2024-05-31 DIAGNOSIS — Z79.01 LONG TERM (CURRENT) USE OF ANTICOAGULANTS: ICD-10-CM

## 2024-05-31 DIAGNOSIS — I26.99 PULMONARY EMBOLISM WITH INFARCTION (H): Primary | ICD-10-CM

## 2024-05-31 DIAGNOSIS — Z86.711 HISTORY OF PULMONARY EMBOLISM: ICD-10-CM

## 2024-05-31 LAB
ATRIAL RATE - MUSE: 76 BPM
BACTERIA UR CULT: ABNORMAL
DIASTOLIC BLOOD PRESSURE - MUSE: NORMAL MMHG
INTERPRETATION ECG - MUSE: NORMAL
P AXIS - MUSE: 41 DEGREES
PR INTERVAL - MUSE: 198 MS
QRS DURATION - MUSE: 94 MS
QT - MUSE: 408 MS
QTC - MUSE: 459 MS
R AXIS - MUSE: 5 DEGREES
SYSTOLIC BLOOD PRESSURE - MUSE: NORMAL MMHG
T AXIS - MUSE: 30 DEGREES
VENTRICULAR RATE- MUSE: 76 BPM

## 2024-05-31 NOTE — PROGRESS NOTES
"ANTICOAGULATION  MANAGEMENT: Discharge Review    Valentina Olmedo chart reviewed for anticoagulation continuity of care    Emergency room visit on 5/30/24 for Accelerated hypertention, UTI, nonintractable headache.    Discharge disposition: Home    Results:    No results for input(s): \"INR\", \"NEEVFP86CGBM\", \"F2\", \"ALMWH\", \"AAUFH\" in the last 168 hours.  Anticoagulation inpatient management:     home regimen continued    Anticoagulation discharge instructions:     Warfarin dosing: home regimen continued   Bridging: No   INR goal change: No      Medication changes affecting anticoagulation: Yes: Cefdinir for 7 days 5/30-6/6/24    Additional factors affecting anticoagulation: Yes: UTI     PLAN     Recommend to check INR on 6/3 or 6/4 due to UTI/Antibiotic with home care, Katelyn [phone].    Recommended follow up is scheduled, left message for home care nurse to return call to Anticoagulation clinic to discuss patient. Gave first name but no identifying information as no confidential greeting in VM.   Katelyn returned call to ANTICOAGULATION CLINIC reporting patient should be seen Monday.  Anticoagulation Calendar updated    Kyleigh Jimenes RN  "
09-Aug-2023

## 2024-06-01 ENCOUNTER — TELEPHONE (OUTPATIENT)
Dept: EMERGENCY MEDICINE | Facility: HOSPITAL | Age: 64
End: 2024-06-01
Payer: COMMERCIAL

## 2024-06-01 NOTE — TELEPHONE ENCOUNTER
[facility]    Reason for call: Lab Result Notification     Lab Result (including Rx patient on, if applicable).  If culture, copy of lab report at bottom.  Lab Result: See below  cefdinir (OMNICEF) 300 MG capsule- take 1 cap (300mg) BID x 7 days SUSCEPTIBLE  Creatinine Level (mg/dl)   Creatinine   Date Value Ref Range Status   05/30/2024 0.85 0.51 - 0.95 mg/dL Final   11/12/2015 0.67 0.52 - 1.04 mg/dL Final    Creatinine clearance (ml/min), if applicable    Serum creatinine: 0.85 mg/dL 05/30/24 1602  Estimated creatinine clearance: 108.7 mL/min     Patient's current Symptoms:   Left voicemail message requesting a call back to [facility] ED Lab Result RN at [phone]. RN is available every day between 9 a.m. and 5:30 p.m. Will send a MyChart letter.    RN Recommendations/Instructions per [facility] ED lab result protocol:   [facility] ED lab result protocol utilized: urine culture      Dipesh Villafuerte, RN

## 2024-06-03 ENCOUNTER — OFFICE VISIT (OUTPATIENT)
Dept: FAMILY MEDICINE | Facility: CLINIC | Age: 64
End: 2024-06-03
Payer: COMMERCIAL

## 2024-06-03 ENCOUNTER — ANTICOAGULATION THERAPY VISIT (OUTPATIENT)
Dept: ANTICOAGULATION | Facility: CLINIC | Age: 64
End: 2024-06-03

## 2024-06-03 VITALS
RESPIRATION RATE: 16 BRPM | BODY MASS INDEX: 47.09 KG/M2 | TEMPERATURE: 98.1 F | SYSTOLIC BLOOD PRESSURE: 136 MMHG | OXYGEN SATURATION: 95 % | HEART RATE: 73 BPM | DIASTOLIC BLOOD PRESSURE: 84 MMHG | HEIGHT: 66 IN | WEIGHT: 293 LBS

## 2024-06-03 DIAGNOSIS — Z79.01 LONG TERM (CURRENT) USE OF ANTICOAGULANTS: ICD-10-CM

## 2024-06-03 DIAGNOSIS — N39.0 URINARY TRACT INFECTION WITHOUT HEMATURIA, SITE UNSPECIFIED: ICD-10-CM

## 2024-06-03 DIAGNOSIS — I26.99 PULMONARY EMBOLISM WITH INFARCTION (H): Primary | ICD-10-CM

## 2024-06-03 DIAGNOSIS — I26.99 PULMONARY EMBOLISM WITH INFARCTION (H): ICD-10-CM

## 2024-06-03 DIAGNOSIS — I10 ESSENTIAL HYPERTENSION: ICD-10-CM

## 2024-06-03 DIAGNOSIS — Z86.711 HISTORY OF PULMONARY EMBOLISM: ICD-10-CM

## 2024-06-03 DIAGNOSIS — Z09 FOLLOW-UP EXAM: Primary | ICD-10-CM

## 2024-06-03 LAB — INR (EXTERNAL): 2.1 (ref 0.9–1.1)

## 2024-06-03 PROCEDURE — 99214 OFFICE O/P EST MOD 30 MIN: CPT

## 2024-06-03 PROCEDURE — G2211 COMPLEX E/M VISIT ADD ON: HCPCS

## 2024-06-03 NOTE — PROGRESS NOTES
Roselyn called back and the nurse cannot go out till 6/11/2024 to recheck INR. Reviewed chart and gave ok to recheck on 6/11    Kindra Garay RN    [facility]

## 2024-06-03 NOTE — PROGRESS NOTES
ANTICOAGULATION MANAGEMENT     Valentina Olmedo 64 year old female is on warfarin with therapeutic INR result. (Goal INR 2.0-3.0)    Recent labs: (last 7 days)     06/03/24  0925   INR 2.1*       ASSESSMENT     Source(s): Patient/Caregiver Call and Home Care/Facility Nurse     Warfarin doses taken: Warfarin taken as instructed  Diet: No new diet changes identified  Medication/supplement changes: Cefdinir 5/30-6/8 ( patient has 8 doses remaining), possible increase in INR with medication  New illness, injury, or hospitalization: Yes: ED 5/30, diagnosis UTI  Signs or symptoms of bleeding or clotting: No  Previous result: Therapeutic last 2(+) visits  Additional findings: None       PLAN     Recommended plan for temporary change(s) affecting INR     Dosing Instructions: Continue your current warfarin dose with next INR in 1 week       Summary  As of 6/3/2024      Full warfarin instructions:  10 mg every Sun, Tue, Thu; 7.5 mg all other days   Next INR check:  6/10/2024               Telephone call with [facility] home care nurse who verbalizes understanding and agrees to plan    Orders given to  Homecare nurse/facility to recheck    Education provided:   Please call back if any changes to your diet, medications or how you've been taking warfarin  Goal range and lab monitoring: goal range and significance of current result  Interaction IS NOT anticipated between warfarin and patient is snf through therapy and is stable at this time.     Plan made per [facility] anticoagulation protocol    Kristin Shah RN  Anticoagulation Clinic  6/3/2024    _______________________________________________________________________     Anticoagulation Episode Summary       Current INR goal:  2.0-3.0   TTR:  54.8% (1 y)   Target end date:  Indefinite   Send INR reminders to:  P[facility]    Indications    History of pulmonary embolism (Resolved) [Z86.711]  Pulmonary embolism with infarction (H) [I26.99]  Long term (current) use of  anticoagulants [Z79.01]  History of pulmonary embolism [Z86.711]             Comments:               Anticoagulation Care Providers       Provider Role Specialty Phone number    Promise Vanegas MD Referring Family Medicine [phone]    Donald Rooney MD Referring Internal Medicine [phone]    Jose Maria Morin MD Referring Family Medicine [phone]

## 2024-06-03 NOTE — PROGRESS NOTES
Assessment & Plan   Problem List Items Addressed This Visit       Essential hypertension     Indication for recent Emergency Room Visit. Notes reviewed. Today, she remains within goal range. She does not check her blood pressure regularly at home however does plan to obtain a cuff. She has had what sounds like some medication side effects with a higher dose of losartan, therefore given this and her normalization both here and in the ER I don't see any justification to alter her dose. Would encourage follow up with her PCP if blood pressure is trending upwards and she is in agreement with this.         Pulmonary embolism with infarction (H)     INR remains within therapeutic range today with the addition of cefdinir.          Other Visit Diagnoses       Follow-up exam    -  Primary    Doing well today. No needs identified.    Urinary tract infection without hematuria, site unspecified        Discussed culture results, as patient had not received these. She will finish antibiotic as prescribed. Reviewed reasons to return to care.                MED REC REQUIRED  Post Medication Reconciliation Status:  Patient was not discharged from an inpatient facility or TCU    The longitudinal plan of care for the diagnosis(es)/condition(s) as documented were addressed during this visit. Due to the added complexity in care, I will continue to support Valentina in the subsequent management and with ongoing continuity of care.    Magda Montgomery is a 64 year old, presenting for the following health issues:  follow up to ER        5/21/2024    12:00 PM   Additional Questions   Roomed by xl   Accompanied by spouse     Patient is being seen today accompanied by her spouse to discuss recent Emergency Room Visit. Last week, patient was at her scheduled cardiac rehab appointment when she was found to be quite hypertensive. She was actually triaged to the Emergency Room and had comprehensive workup completed. Everything done was reassuring  "but she was incidentally found to have a UTI. Her blood pressure normalized without intervention. Today, she has no significant concerns. She is at her baseline health. She would like to know if her PCP placed DME orders as discussed at their last visit.            Objective    /84 (BP Location: Left arm, Patient Position: Left side, Cuff Size: Adult Large)   Pulse 73   Temp 98.1  F (36.7  C) (Oral)   Resp 16   Ht 1.676 m (5' 6\")   Wt (!) 166.6 kg (367 lb 3.2 oz)   LMP  (LMP Unknown)   SpO2 95%   BMI 59.27 kg/m    Body mass index is 59.27 kg/m .    Physical Exam  Vitals and nursing note reviewed.   Constitutional:       General: She is not in acute distress.     Appearance: Normal appearance.   Cardiovascular:      Rate and Rhythm: Normal rate and regular rhythm.   Pulmonary:      Effort: Pulmonary effort is normal. No respiratory distress.   Neurological:      Mental Status: She is alert.   Psychiatric:         Mood and Affect: Mood normal.         Behavior: Behavior normal.         Thought Content: Thought content normal.            Signed Electronically by: SHAHRZAD Watson CNP    "

## 2024-06-03 NOTE — PATIENT INSTRUCTIONS
You will need to find a medical supply store from which to obtain the wheelchair, walker and incontinence supplies. I have given you the orders today, but they will likely request the most recent office visit as well. You can call us with this store's fax information and we will take care of that  Complete antibiotic for UTI as prescribed  No changes were made to your blood pressure at this time but we will keep a close eye on things.

## 2024-06-04 ENCOUNTER — TELEPHONE (OUTPATIENT)
Dept: PHYSICAL THERAPY | Facility: REHABILITATION | Age: 64
End: 2024-06-04

## 2024-06-05 ENCOUNTER — MEDICAL CORRESPONDENCE (OUTPATIENT)
Dept: HEALTH INFORMATION MANAGEMENT | Facility: CLINIC | Age: 64
End: 2024-06-05
Payer: COMMERCIAL

## 2024-06-06 ENCOUNTER — HOSPITAL ENCOUNTER (OUTPATIENT)
Dept: CARDIAC REHAB | Facility: HOSPITAL | Age: 64
Discharge: HOME | End: 2024-06-06
Attending: INTERNAL MEDICINE
Payer: COMMERCIAL

## 2024-06-06 PROCEDURE — G0239 OTH RESP PROC, GROUP: HCPCS

## 2024-06-07 DIAGNOSIS — J44.9 CHRONIC OBSTRUCTIVE PULMONARY DISEASE, UNSPECIFIED COPD TYPE (H): ICD-10-CM

## 2024-06-07 DIAGNOSIS — E03.9 ACQUIRED HYPOTHYROIDISM: ICD-10-CM

## 2024-06-07 DIAGNOSIS — Z53.9 DIAGNOSIS NOT YET DEFINED: Primary | ICD-10-CM

## 2024-06-07 PROCEDURE — G0179 MD RECERTIFICATION HHA PT: HCPCS | Performed by: FAMILY MEDICINE

## 2024-06-07 RX ORDER — LEVOTHYROXINE SODIUM 300 UG/1
300 TABLET ORAL DAILY
Qty: 28 TABLET | OUTPATIENT
Start: 2024-06-07

## 2024-06-07 RX ORDER — UMECLIDINIUM 62.5 UG/1
1 AEROSOL, POWDER ORAL DAILY
Qty: 30 EACH | Refills: 1 | Status: SHIPPED | OUTPATIENT
Start: 2024-06-07 | End: 2024-07-31

## 2024-06-08 ENCOUNTER — MEDICAL CORRESPONDENCE (OUTPATIENT)
Dept: HEALTH INFORMATION MANAGEMENT | Facility: CLINIC | Age: 64
End: 2024-06-08

## 2024-06-11 ENCOUNTER — ANCILLARY PROCEDURE (OUTPATIENT)
Dept: MAMMOGRAPHY | Facility: CLINIC | Age: 64
End: 2024-06-11
Attending: FAMILY MEDICINE
Payer: COMMERCIAL

## 2024-06-11 ENCOUNTER — HOSPITAL ENCOUNTER (OUTPATIENT)
Dept: CARDIAC REHAB | Facility: HOSPITAL | Age: 64
Discharge: HOME | End: 2024-06-11
Attending: INTERNAL MEDICINE
Payer: COMMERCIAL

## 2024-06-11 DIAGNOSIS — Z12.31 VISIT FOR SCREENING MAMMOGRAM: ICD-10-CM

## 2024-06-11 PROCEDURE — 77063 BREAST TOMOSYNTHESIS BI: CPT

## 2024-06-11 PROCEDURE — G0239 OTH RESP PROC, GROUP: HCPCS

## 2024-06-11 NOTE — PROGRESS NOTES
Katelyn called back asking for recheck to be pushed out until 6/12 as patient was not home from doctors appointment in time today to have INR checked. Per protocol OK to go out 2 weeks from last check as last 2 INRs have been within goal range.     Ashley Parsons, RN, BSN, PHN  Anticoagulation Nurse  [phone]

## 2024-06-12 ENCOUNTER — DOCUMENTATION ONLY (OUTPATIENT)
Dept: ANTICOAGULATION | Facility: CLINIC | Age: 64
End: 2024-06-12
Payer: COMMERCIAL

## 2024-06-12 ENCOUNTER — MEDICAL CORRESPONDENCE (OUTPATIENT)
Dept: HEALTH INFORMATION MANAGEMENT | Facility: CLINIC | Age: 64
End: 2024-06-12
Payer: COMMERCIAL

## 2024-06-12 ENCOUNTER — ANTICOAGULATION THERAPY VISIT (OUTPATIENT)
Dept: ANTICOAGULATION | Facility: CLINIC | Age: 64
End: 2024-06-12
Payer: COMMERCIAL

## 2024-06-12 DIAGNOSIS — I26.99 PULMONARY EMBOLISM WITH INFARCTION (H): Primary | ICD-10-CM

## 2024-06-12 DIAGNOSIS — Z79.01 LONG TERM (CURRENT) USE OF ANTICOAGULANTS: ICD-10-CM

## 2024-06-12 DIAGNOSIS — Z86.711 HISTORY OF PULMONARY EMBOLISM: ICD-10-CM

## 2024-06-12 LAB — INR (EXTERNAL): 2.3 (ref 0.9–1.1)

## 2024-06-12 NOTE — PROGRESS NOTES
ANTICOAGULATION CLINIC REFERRAL RENEWAL REQUEST       An annual renewal order is required for all patients referred to [facility].?  Please review and sign the pended referral order for Valentina Olmedo.       ANTICOAGULATION SUMMARY      Warfarin indication(s)   PE    Mechanical heart valve present?  NO       Current goal range   INR: 2.0-3.0     Goal appropriate for indication? Goal INR 2-3, standard for indication(s) above     Time in Therapeutic Range (TTR)  (Goal > 60%) 56%       Office visit with referring provider's group within last year yes on 6/3/24       Tosin Singh, RN  [facility]

## 2024-06-12 NOTE — PROGRESS NOTES
ANTICOAGULATION MANAGEMENT     Valentina Olmedo 64 year old female is on warfarin with therapeutic INR result. (Goal INR 2.0-3.0)    Recent labs: (last 7 days)     06/12/24  0000   INR 2.3*       ASSESSMENT     Source(s): Chart Review and Home Care/Facility Nurse - Katelyn wayne  [phone]    Warfarin doses taken: Warfarin taken as instructed  Diet: No new diet changes identified  Medication/supplement changes: None noted - finished cefdinir about 4 days ago. UTI gone.   New illness, injury, or hospitalization: No  Signs or symptoms of bleeding or clotting: No  Previous result: Therapeutic last 2(+) visits  Additional findings: None       PLAN     Recommended plan for no diet, medication or health factor changes affecting INR     Dosing Instructions: Continue your current warfarin dose with next INR in 1 week       Summary  As of 6/12/2024      Full warfarin instructions:  10 mg every Sun, Tue, Thu; 7.5 mg all other days   Next INR check:  6/19/2024               Telephone call with Katelyn home care nurse who verbalizes understanding and agrees to plan    Orders given to  Homecare nurse/facility to recheck    Education provided:   Please call back if any changes to your diet, medications or how you've been taking warfarin    Plan made per [facility] anticoagulation protocol    Tosin Singh, RN  Anticoagulation Clinic  6/12/2024    _______________________________________________________________________     Anticoagulation Episode Summary       Current INR goal:  2.0-3.0   TTR:  56.0% (1 y)   Target end date:  Indefinite   Send INR reminders to:  [facility]    Indications    History of pulmonary embolism (Resolved) [Z86.711]  Pulmonary embolism with infarction (H) [I26.99]  Long term (current) use of anticoagulants [Z79.01]  History of pulmonary embolism [Z86.711]             Comments:               Anticoagulation Care Providers       Provider Role Specialty Phone number    Promise Vanegas MD Referring  Family Medicine [phone]    Donald Rooney MD Referring Internal Medicine [phone]    Jose Maria Morin MD Referring Family Medicine [phone]

## 2024-06-13 ENCOUNTER — HOSPITAL ENCOUNTER (OUTPATIENT)
Dept: CARDIAC REHAB | Facility: HOSPITAL | Age: 64
Discharge: HOME | End: 2024-06-13
Attending: INTERNAL MEDICINE
Payer: COMMERCIAL

## 2024-06-13 ENCOUNTER — MEDICAL CORRESPONDENCE (OUTPATIENT)
Dept: HEALTH INFORMATION MANAGEMENT | Facility: CLINIC | Age: 64
End: 2024-06-13
Payer: COMMERCIAL

## 2024-06-13 DIAGNOSIS — E03.9 ACQUIRED HYPOTHYROIDISM: ICD-10-CM

## 2024-06-13 PROCEDURE — G0239 OTH RESP PROC, GROUP: HCPCS

## 2024-06-14 ENCOUNTER — NURSE TRIAGE (OUTPATIENT)
Dept: NURSING | Facility: CLINIC | Age: 64
End: 2024-06-14
Payer: COMMERCIAL

## 2024-06-14 DIAGNOSIS — M54.12 CERVICAL RADICULOPATHY: ICD-10-CM

## 2024-06-14 RX ORDER — LEVOTHYROXINE SODIUM 300 UG/1
300 TABLET ORAL DAILY
Qty: 28 TABLET | Refills: 12 | Status: SHIPPED | OUTPATIENT
Start: 2024-06-14

## 2024-06-14 RX ORDER — PREGABALIN 75 MG/1
75 CAPSULE ORAL 2 TIMES DAILY
Qty: 60 CAPSULE | Refills: 0 | Status: SHIPPED | OUTPATIENT
Start: 2024-06-14 | End: 2024-07-12

## 2024-06-14 NOTE — TELEPHONE ENCOUNTER
Health Call Center    Phone Message    May a detailed message be left on voicemail: yes     Reason for Call: Medication Question or concern regarding medication   Prescription Clarification  Name of Medication: pregabalin (LYRICA) 75 MG capsule   Prescribing Provider: Laure Dennis APRN CNP    [place] [facility] [place] [address], [address]    What on the order needs clarification? Shani with patient's pharmacy is requesting updated dose of prescription. She states they are starting to fill out patient's packs for next week, so it is not urgent but would like to hear back by Monday or Tuesday.       Action Taken: Other: Pain    Travel Screening: Not Applicable     Date of Service:

## 2024-06-14 NOTE — TELEPHONE ENCOUNTER
Pending Prescriptions:                       Disp   Refills    pregabalin (LYRICA) 75 MG capsule                0            Sig: Take 1 capsule (75 mg) by mouth 2 times daily    [facility] [place] [address] [place] [address], [address]

## 2024-06-14 NOTE — TELEPHONE ENCOUNTER
Pt calling w/ dizziness- started about a month ago has increased last week. Pt feels this is from her lyrica. Current lyrica dose 75 mg three times a day- Rx has been increasing over last few months.  Woozy,spinning alittle,    Uses w/c, able to walk to bathroom by self,- has not fallen  Has PCA w/her.  Come everyday M-F  9:30 AM - 6:30PM  Denies any nausea, vomiting  See note.          High priority note to Pain clinic for call back w/ plan.  Pt of : Laure Dennis APRN CNP  Pain Medicine    Pt # [phone]  [place] [facility] [place] [address], [address]     Reason for Disposition   Taking a medicine that could cause dizziness (e.g., blood pressure medications, diuretics)    Additional Information   Negative: SEVERE difficulty breathing (e.g., struggling for each breath, speaks in single words)   Negative: Shock suspected (e.g., cold/pale/clammy skin, too weak to stand, low BP, rapid pulse)   Negative: Difficult to awaken or acting confused (e.g., disoriented, slurred speech)   Negative: Fainted, and still feels dizzy afterwards   Negative: Overdose (accidental or intentional) of medications   Negative: New neurologic deficit that is present now: * Weakness of the face, arm, or leg on one side of the body * Numbness of the face, arm, or leg on one side of the body * Loss of speech or garbled speech   Negative: Heart beating < 50 beats per minute OR > 140 beats per minute   Negative: Sounds like a life-threatening emergency to the triager   Negative: Chest pain   Negative: Rectal bleeding, bloody stool, or tarry-black stool   Negative: Vomiting is main symptom   Negative: Diarrhea is main symptom   Negative: Headache is main symptom   Negative: Heat exhaustion suspected (i.e., dehydration from heat exposure)   Negative: Patient states that they are having an anxiety or panic attack   Negative: Dizziness from low blood sugar (i.e., < 60 mg/dl or 3.5 mmol/l)    "Negative: Lightheadedness (dizziness) present now, after 2 hours of rest and fluids   Negative: Spinning or tilting sensation (vertigo) present now   Negative: Fever > 103 F (39.4 C)   Negative: Fever > 100.0 F (37.8 C) and has diabetes mellitus or a weak immune system (e.g., HIV positive, cancer chemotherapy, organ transplant, splenectomy, chronic steroids)   Negative: MODERATE dizziness (e.g., interferes with normal activities)  (Exception: Dizziness caused by heat exposure, sudden standing, or poor fluid intake.)   Negative: Vomiting occurs with dizziness   Negative: Patient wants to be seen   Negative: SEVERE dizziness (e.g., unable to stand, requires support to walk, feels like passing out now)   Negative: SEVERE headache or neck pain   Negative: Spinning or tilting sensation (vertigo) present now and one or more stroke risk factors (i.e., hypertension, diabetes mellitus, prior stroke/TIA, heart attack, age over 60) (Exception: Prior physician evaluation for this AND no different/worse than usual.)   Negative: Neurologic deficit that was brief (now gone), ANY of the following:* Weakness of the face, arm, or leg on one side of the body* Numbness of the face, arm, or leg on one side of the body* Loss of speech or garbled speech   Negative: Loss of vision or double vision  (Exception: Similar to previous migraines.)   Negative: Extra heartbeats, irregular heart beating, or heart is beating very fast (i.e., 'palpitations')   Negative: Difficulty breathing   Negative: Drinking very little and dehydration suspected (e.g., no urine > 12 hours, very dry mouth, very lightheaded)   Negative: Follows bleeding (e.g., stomach, rectum, vagina)  (Exception: Became dizzy from sight of small amount blood.)   Negative: Patient sounds very sick or weak to the triager    Answer Assessment - Initial Assessment Questions  1. DESCRIPTION: \"Describe your dizziness.\"      Started over a month ago  Has gotten worse this past week, " "thinks this is from the Lyrica    Has PCA w/her.  Come everyday M-F  9:30 AM - 6:30PM    Denies any nausea/vomiting        2. LIGHTHEADED: \"Do you feel lightheaded?\" (e.g., somewhat faint, woozy, weak upon standing)      Woozy, generalized weakness( due to overall  health issues) - stand/sit    3. VERTIGO: \"Do you feel like either you or the room is spinning or tilting?\" (i.e. vertigo)      Spinning a little    4. SEVERITY: \"How bad is it?\"  \"Do you feel like you are going to faint?\" \"Can you stand and walk?\"    - MILD: Feels slightly dizzy, but walking normally.    - MODERATE: Feels unsteady when walking, but not falling; interferes with normal activities (e.g., school, work).    - SEVERE: Unable to walk without falling, or requires assistance to walk without falling; feels like passing out now.         Uses w/c, able to walk to bathroom,- has not fallen  Can walk by self  Mild    5. ONSET:  \"When did the dizziness begin?\"      One month    6. AGGRAVATING FACTORS: \"Does anything make it worse?\" (e.g., standing, change in head position)      Bending over     7. HEART RATE: \"Can you tell me your heart rate?\" \"How many beats in 15 seconds?\"  (Note: not all patients can do this)        unsure  8. CAUSE: \"What do you think is causing the dizziness?\"        Medication  pregabalin (LYRICA) 75 MG capsule 90 capsule 0 5/20/2024 6/19/2024 No  Sig - Route: Take 1 capsule (75 mg) by mouth 3 times daily for 30 days - Oral        9. RECURRENT SYMPTOM: \"Have you had dizziness before?\" If Yes, ask: \"When was the last time?\" \"What happened that time?\"        10. OTHER SYMPTOMS: \"Do you have any other symptoms?\" (e.g., fever, chest pain, vomiting, diarrhea, bleeding)        Alittle constipation    11. PREGNANCY: \"Is there any chance you are pregnant?\" \"When was your last menstrual period?\"    Protocols used: Dizziness-A-[place]    "

## 2024-06-14 NOTE — TELEPHONE ENCOUNTER
Health Call Center    Phone Message    May a detailed message be left on voicemail: yes     Reason for Call: Medication Question or concern regarding medication   Prescription Clarification  Name of Medication: pregabalin (LYRICA) 75 MG capsule   Prescribing Provider: Laure Dennis APRN CNP    [place]   [facility] [place] [address], [address]      What on the order needs clarification? Patient called stating she is experiencing severe dizziness, stating it has been going on for over a month and she did not consider until now that it could be a side effect of her medication. Writer connected patient to red flag triage for possible medication side effects.       Action Taken: Message routed to:  Other: [facility] Pain    Travel Screening: Not Applicable     Date of Service:

## 2024-06-14 NOTE — TELEPHONE ENCOUNTER
Called patient and told her to reduce the dose of lyrica to 75mg BID instead of TID.  She previously tolerated 50mg TID.  She will call on Monday if she is still having symptoms.  CULLEN

## 2024-06-17 ENCOUNTER — TELEPHONE (OUTPATIENT)
Dept: PULMONOLOGY | Facility: CLINIC | Age: 64
End: 2024-06-17
Payer: COMMERCIAL

## 2024-06-17 DIAGNOSIS — F17.210 CIGARETTE NICOTINE DEPENDENCE: ICD-10-CM

## 2024-06-17 RX ORDER — VARENICLINE TARTRATE 1 MG/1
1 TABLET, FILM COATED ORAL 2 TIMES DAILY
Qty: 56 TABLET | Refills: 0 | Status: SHIPPED | OUTPATIENT
Start: 2024-06-17 | End: 2024-07-05

## 2024-06-17 NOTE — TELEPHONE ENCOUNTER
[facility] Call Center    Phone Message    May a detailed message be left on voicemail: yes     Reason for Call: Medication Refill Request    Has the patient contacted the pharmacy for the refill? Yes   Name of medication being requested: Varenicline  Provider who prescribed the medication: Emir  Pharmacy: [facility]  Date medication is needed: ASAP-needs to have this no later than Friday (6/21/24) in order to send it out with her next scheduled shipement       Action Taken: Other: Pulm    Travel Screening: Not Applicable

## 2024-06-19 ENCOUNTER — ANTICOAGULATION THERAPY VISIT (OUTPATIENT)
Dept: ANTICOAGULATION | Facility: CLINIC | Age: 64
End: 2024-06-19
Payer: COMMERCIAL

## 2024-06-19 DIAGNOSIS — Z79.01 LONG TERM (CURRENT) USE OF ANTICOAGULANTS: ICD-10-CM

## 2024-06-19 DIAGNOSIS — Z86.711 HISTORY OF PULMONARY EMBOLISM: ICD-10-CM

## 2024-06-19 DIAGNOSIS — I26.99 PULMONARY EMBOLISM WITH INFARCTION (H): Primary | ICD-10-CM

## 2024-06-19 LAB — INR (EXTERNAL): 0.9 (ref 0.9–1.1)

## 2024-06-19 NOTE — PROGRESS NOTES
ANTICOAGULATION MANAGEMENT     Valentina Olmedo 64 year old female is on warfarin with subtherapeutic INR result. (Goal INR 2.0-3.0)    Recent labs: (last 7 days)     06/19/24  1413   INR 0.9       ASSESSMENT     Source(s): Chart Review, Patient/Caregiver Call, and Home Care/Facility Nurse     Warfarin doses taken: Missed dose(s) may be affecting INR and While hospitalized on 06/: reversed with unsure on unsure. Discharge summary notes INR corrected from 2.7 to 1.6  Discharged on: hold warfarin until INR on 06/21/2024  Diet: No new diet changes identified  Medication/supplement changes:  stopped losartan , 10 mg of vitamin K on 6/15 and 5 mg on 6/16 given in patient.  New illness, injury, or hospitalization: Yes: [facility] for leg hematoma right lower leg and fall  Signs or symptoms of bleeding or clotting: No  Previous result: Therapeutic last 2(+) visits  Additional findings: None and Hgb decreased 2g from arrival per discharge summary.   Patient had I&D to remove hematoma.  Patient was not discharge with bridging.       PLAN     Recommended plan for temporary change(s) affecting INR     Dosing Instructions: hold 2 doses then booster dose and continue your current warfarin dose with next INR in 1 week       Summary  As of 6/19/2024      Full warfarin instructions:  6/19: Hold; 6/20: Hold; 6/21: 15 mg; Otherwise 10 mg every Sun, Tue, Thu; 7.5 mg all other days   Next INR check:  6/26/2024               Detailed voice message left for Carla Dempsey home care nurse with dosing instructions and follow up date.     Orders given to  Homecare nurse/facility to recheck    Education provided:   Symptom monitoring: monitoring for bleeding signs and symptoms, monitoring for clotting signs and symptoms, monitoring for stroke signs and symptoms, and when to seek medical attention/emergency care    Plan made with [facility] Pharmacist Paula Garay, [id number]  Anticoagulation  Clinic  6/19/2024    _______________________________________________________________________     Anticoagulation Episode Summary       Current INR goal:  2.0-3.0   TTR:  54.5% (1 y)   Target end date:  Indefinite   Send INR reminders to:  [facility]    Indications    History of pulmonary embolism (Resolved) [Z86.711]  Pulmonary embolism with infarction (H) [I26.99]  Long term (current) use of anticoagulants [Z79.01]  History of pulmonary embolism [Z86.711]             Comments:               Anticoagulation Care Providers       Provider Role Specialty Phone number    Promise Vanegas MD Referring Family Medicine [phone]    Donald Rooney MD Referring Internal Medicine [phone]    Jose Maria Morin MD Referring Family Medicine [phone]

## 2024-06-20 ENCOUNTER — MEDICAL CORRESPONDENCE (OUTPATIENT)
Dept: HEALTH INFORMATION MANAGEMENT | Facility: CLINIC | Age: 64
End: 2024-06-20
Payer: COMMERCIAL

## 2024-06-24 ENCOUNTER — MEDICAL CORRESPONDENCE (OUTPATIENT)
Dept: HEALTH INFORMATION MANAGEMENT | Facility: CLINIC | Age: 64
End: 2024-06-24

## 2024-06-24 ENCOUNTER — OFFICE VISIT (OUTPATIENT)
Dept: FAMILY MEDICINE | Facility: CLINIC | Age: 64
End: 2024-06-24
Payer: COMMERCIAL

## 2024-06-24 VITALS
HEART RATE: 74 BPM | HEIGHT: 66 IN | BODY MASS INDEX: 59.27 KG/M2 | OXYGEN SATURATION: 96 % | DIASTOLIC BLOOD PRESSURE: 88 MMHG | RESPIRATION RATE: 16 BRPM | TEMPERATURE: 98.3 F | SYSTOLIC BLOOD PRESSURE: 130 MMHG

## 2024-06-24 DIAGNOSIS — S80.11XA HEMATOMA OF RIGHT LOWER LEG: ICD-10-CM

## 2024-06-24 DIAGNOSIS — Z09 HOSPITAL DISCHARGE FOLLOW-UP: Primary | ICD-10-CM

## 2024-06-24 DIAGNOSIS — I10 ESSENTIAL HYPERTENSION: ICD-10-CM

## 2024-06-24 PROCEDURE — 99213 OFFICE O/P EST LOW 20 MIN: CPT | Performed by: FAMILY MEDICINE

## 2024-06-24 RX ORDER — AZELASTINE 1 MG/ML
SPRAY, METERED NASAL
COMMUNITY

## 2024-06-24 ASSESSMENT — PATIENT HEALTH QUESTIONNAIRE - PHQ9
SUM OF ALL RESPONSES TO PHQ QUESTIONS 1-9: 13
SUM OF ALL RESPONSES TO PHQ QUESTIONS 1-9: 13
10. IF YOU CHECKED OFF ANY PROBLEMS, HOW DIFFICULT HAVE THESE PROBLEMS MADE IT FOR YOU TO DO YOUR WORK, TAKE CARE OF THINGS AT HOME, OR GET ALONG WITH OTHER PEOPLE: EXTREMELY DIFFICULT

## 2024-06-24 NOTE — PROGRESS NOTES
"  Assessment & Plan   Problem List Items Addressed This Visit       Essential hypertension     There is a question of whether or not she fell initially due to hypotension.  The patient is very clear that she fell as she got up to try to help her spouse and was walking across the room.  She believes she tripped.  She did have hypotensive blood pressure measurements while in the hospital and for this reason losartan was held.  We will continue to hold 12.5 mg losartan.  She is going to check her blood pressure and report back.         Hematoma of right lower leg     Hospital follow-up.  Right lower extremity is swollen in comparison the left lower extremity.  The patient describes keeping this covered with an Ace bandage to limit swelling and raised on a frequent basis.  There is no evidence of infection.  Her ankle mobility has improved dramatically in the last 48 hours.  On inclined to think that she is improving.  She has restarted anticoagulation.  Blood pressure as discussed elsewhere.  She will follow-up with the surgeon as recommended at discharge next week.         Relevant Orders    CBC with platelets     Other Visit Diagnoses       Hospital discharge follow-up    -  Primary    Relevant Orders    CBC with platelets           Depression Screening Follow Up    Follow Up Actions Taken  Referred patient back to mental health provider.  She sees psychiatry    Discussed the following ways the patient can remain in a safe environment:  be around others      Subjective   Valentina is a 64 year old, presenting for the following health issues:  RECHECK        6/24/2024     1:06 PM   Additional Questions   Roomed by xl   Accompanied by      She continues to have pain. She fell the night she got home   - she is back on warfarin.  Mobility of foot has improved a lot in comparison to a couple of days ago  \"It still hurts.\"   She tripped and fell.  The patient very clearly states that she tripped and fell on the hem of her " "pants without lightheadedness.  No dizziness.  While in the hospital she underwent an evacuation of the hematoma.  I&D.  This was done in the OR.    History of Present Illness       Reason for visit:  My    She eats 0-1 servings of fruits and vegetables daily.She consumes 2 sweetened beverage(s) daily.She exercises with enough effort to increase her heart rate 9 or less minutes per day.  She exercises with enough effort to increase her heart rate 3 or less days per week.   She is taking medications regularly.       Hospital Follow-up Visit:    Hospital/Nursing Home/ Rehab Facility:  [facility]  Date of Admission: 6/15/24  Date of Discharge: 6/17/24  Reason(s) for Admission: Leg hematoma   Was the patient in the ICU or did the patient experience delirium during hospitalization?  Yes     Do you have any other stressors you would like to discuss with your provider? OTHER: none    Problems taking medications regularly:  None  Medication changes since discharge: She resumed warfarin per recommendations.  Problems adhering to non-medication therapy:  None    Summary of hospitalization:  CareEverywhere information obtained and reviewed  Diagnostic Tests/Treatments reviewed.  Follow up needed: CBC?  Other Healthcare Providers Involved in Patient s Care:         Homecare and Surgical follow-up appointment - 7/1  Update since discharge: improved.         Plan of care communicated with patient and family                     Objective    /88 (BP Location: Left arm, Patient Position: Sitting, Cuff Size: Adult Large)   Pulse 74   Temp 98.3  F (36.8  C) (Oral)   Resp 16   Ht 1.676 m (5' 6\")   LMP  (LMP Unknown)   SpO2 96%   BMI 59.27 kg/m    Body mass index is 59.27 kg/m .  Physical Exam   General: No acute distress, pleasant.  Obese.  Extremities: The caliber of the right lower extremity is quite a bit larger than the left lower extremity.  It is wrapped in an Ace bandage.  When the Ace bandages removed, there " is a linear 5 cm long surgical incision with no drainage.  There is a small amount of erythema.  Nontender to palpation.  The area in proximity to the incision is raised with some ecchymotic changes.  It is not particularly warm to the touch.  The calf itself is nontender.  The lateral calf is firm but not tense.    INR is subtherapeutic.    Multiple notes from her hospitalization including the surgical consultation was reviewed as part of today's visit.  She had a hematoma.  She had a dramatic decline in hemoglobin.  The hematoma was result of a fall.  The x-ray did not show a new fracture.  The discharge note stated that she fell due to hypotension.          Signed Electronically by: Jose Maria Morin MD

## 2024-06-25 ENCOUNTER — TRANSFERRED RECORDS (OUTPATIENT)
Dept: HEALTH INFORMATION MANAGEMENT | Facility: CLINIC | Age: 64
End: 2024-06-25
Payer: COMMERCIAL

## 2024-06-26 ENCOUNTER — ANTICOAGULATION THERAPY VISIT (OUTPATIENT)
Dept: ANTICOAGULATION | Facility: CLINIC | Age: 64
End: 2024-06-26
Payer: COMMERCIAL

## 2024-06-26 DIAGNOSIS — Z86.711 HISTORY OF PULMONARY EMBOLISM: ICD-10-CM

## 2024-06-26 DIAGNOSIS — Z79.01 LONG TERM (CURRENT) USE OF ANTICOAGULANTS: ICD-10-CM

## 2024-06-26 DIAGNOSIS — I26.99 PULMONARY EMBOLISM WITH INFARCTION (H): Primary | ICD-10-CM

## 2024-06-26 LAB — INR (EXTERNAL): 1.9

## 2024-06-26 NOTE — PROGRESS NOTES
ANTICOAGULATION MANAGEMENT     Valentina Olmedo 64 year old female is on warfarin with subtherapeutic INR result. (Goal INR 2.0-3.0)    Recent labs: (last 7 days)     06/26/24  1520   INR 1.9       ASSESSMENT     Source(s): Chart Review and Home Care/Facility Nurse     Warfarin doses taken: Held on 6/19 and 6/20 recently which may be affecting INR ~confirmed that 15 mg booster dose was taken on 6/21 as instructed.  Diet: No new diet changes identified  Medication/supplement changes: None noted  New illness, injury, or hospitalization: No  Signs or symptoms of bleeding or clotting: No  Previous result: Subtherapeutic  Additional findings: None       PLAN     Recommended plan for temporary change(s) affecting INR     Dosing Instructions: Continue your current warfarin dose with next INR in 1 week       Summary  As of 6/26/2024      Full warfarin instructions:  10 mg every Sun, Tue, Thu; 7.5 mg all other days   Next INR check:  7/3/2024               Telephone call with [facility] home care nurse who verbalizes understanding and agrees to plan    Orders given to  Homecare nurse/facility to recheck    Education provided:   Contact [phone]  with any changes, questions or concerns.     Plan made with [facility] Pharmacist Paula Maria RN  Anticoagulation Clinic  6/26/2024    _______________________________________________________________________     Anticoagulation Episode Summary       Current INR goal:  2.0-3.0   TTR:  52.6% (1 y)   Target end date:  Indefinite   Send INR reminders to:  [facility]    Indications    History of pulmonary embolism (Resolved) [Z86.711]  Pulmonary embolism with infarction (H) [I26.99]  Long term (current) use of anticoagulants [Z79.01]  History of pulmonary embolism [Z86.711]             Comments:               Anticoagulation Care Providers       Provider Role Specialty Phone number    Promise Vanegas MD Referring Family Medicine [phone]    Donald Rooney MD  Referring Internal Medicine [phone]    Jose Maria Morin MD Referring Family Medicine [phone]

## 2024-06-27 ENCOUNTER — MEDICAL CORRESPONDENCE (OUTPATIENT)
Dept: HEALTH INFORMATION MANAGEMENT | Facility: CLINIC | Age: 64
End: 2024-06-27
Payer: COMMERCIAL

## 2024-07-03 ENCOUNTER — ANTICOAGULATION THERAPY VISIT (OUTPATIENT)
Dept: ANTICOAGULATION | Facility: CLINIC | Age: 64
End: 2024-07-03
Payer: COMMERCIAL

## 2024-07-03 DIAGNOSIS — Z79.01 LONG TERM (CURRENT) USE OF ANTICOAGULANTS: ICD-10-CM

## 2024-07-03 DIAGNOSIS — I26.99 PULMONARY EMBOLISM WITH INFARCTION (H): Primary | ICD-10-CM

## 2024-07-03 DIAGNOSIS — Z86.711 HISTORY OF PULMONARY EMBOLISM: ICD-10-CM

## 2024-07-03 LAB — INR (EXTERNAL): 2 (ref 0.9–1.1)

## 2024-07-03 NOTE — PROGRESS NOTES
ANTICOAGULATION MANAGEMENT     Valentina Olmedo 64 year old female is on warfarin with therapeutic INR result. (Goal INR 2.0-3.0)    Recent labs: (last 7 days)     07/03/24  1456   INR 2.0*       ASSESSMENT     Source(s): Chart Review and Home Care/Facility Nurse     Warfarin doses taken: Warfarin taken as instructed  Diet: No new diet changes identified  Medication/supplement changes: None noted  New illness, injury, or hospitalization: No  Signs or symptoms of bleeding or clotting: No  Previous result: Subtherapeutic  Additional findings: None       PLAN     Recommended plan for no diet, medication or health factor changes affecting INR     Dosing Instructions: Continue your current warfarin dose with next INR in 1 week       Summary  As of 7/3/2024      Full warfarin instructions:  10 mg every Sun, Tue, Thu; 7.5 mg all other days   Next INR check:  7/10/2024               Telephone call with Katelyn home care nurse who verbalizes understanding and agrees to plan    Orders given to  Homecare nurse/facility to recheck    Education provided: Please call back if any changes to your diet, medications or how you've been taking warfarin    Plan made per [facility] anticoagulation protocol    Kyleigh Jimenes, RN  Anticoagulation Clinic  7/3/2024    _______________________________________________________________________     Anticoagulation Episode Summary       Current INR goal:  2.0-3.0   TTR:  50.7% (1 y)   Target end date:  Indefinite   Send INR reminders to:  [facility]    Indications    History of pulmonary embolism (Resolved) [Z86.711]  Pulmonary embolism with infarction (H) [I26.99]  Long term (current) use of anticoagulants [Z79.01]  History of pulmonary embolism [Z86.711]             Comments:               Anticoagulation Care Providers       Provider Role Specialty Phone number    Promise Vanegas MD Referring Family Medicine [phone]    Donald Rooney MD Referring Internal Medicine [phone]      Jose Maria Muhammad MD [facility] Medicine [phone]

## 2024-07-05 ENCOUNTER — MEDICAL CORRESPONDENCE (OUTPATIENT)
Dept: HEALTH INFORMATION MANAGEMENT | Facility: CLINIC | Age: 64
End: 2024-07-05
Payer: COMMERCIAL

## 2024-07-05 DIAGNOSIS — F17.210 CIGARETTE NICOTINE DEPENDENCE: ICD-10-CM

## 2024-07-05 DIAGNOSIS — E78.2 MIXED HYPERLIPIDEMIA: ICD-10-CM

## 2024-07-05 DIAGNOSIS — J44.9 CHRONIC OBSTRUCTIVE PULMONARY DISEASE, UNSPECIFIED COPD TYPE (H): ICD-10-CM

## 2024-07-05 RX ORDER — VARENICLINE TARTRATE 1 MG/1
1 TABLET, FILM COATED ORAL 2 TIMES DAILY
Qty: 56 TABLET | Refills: 0 | Status: SHIPPED | OUTPATIENT
Start: 2024-07-05 | End: 2024-08-02

## 2024-07-05 RX ORDER — SALMETEROL XINAFOATE 50 MCG
BLISTER, WITH INHALATION DEVICE INHALATION
Qty: 60 EACH | Refills: 11 | Status: SHIPPED | OUTPATIENT
Start: 2024-07-05

## 2024-07-05 RX ORDER — ROSUVASTATIN CALCIUM 20 MG/1
20 TABLET, COATED ORAL DAILY
Qty: 90 TABLET | Refills: 2 | Status: SHIPPED | OUTPATIENT
Start: 2024-07-05

## 2024-07-09 ENCOUNTER — TELEPHONE (OUTPATIENT)
Dept: BEHAVIORAL HEALTH | Facility: CLINIC | Age: 64
End: 2024-07-09
Payer: COMMERCIAL

## 2024-07-09 ENCOUNTER — OFFICE VISIT (OUTPATIENT)
Dept: PSYCHIATRY | Facility: CLINIC | Age: 64
End: 2024-07-09
Payer: COMMERCIAL

## 2024-07-09 VITALS
BODY MASS INDEX: 59.27 KG/M2 | SYSTOLIC BLOOD PRESSURE: 112 MMHG | HEIGHT: 66 IN | HEART RATE: 80 BPM | DIASTOLIC BLOOD PRESSURE: 62 MMHG

## 2024-07-09 DIAGNOSIS — F17.200 NICOTINE USE DISORDER: Primary | ICD-10-CM

## 2024-07-09 DIAGNOSIS — F25.9 SCHIZOAFFECTIVE DISORDER, UNSPECIFIED TYPE (H): ICD-10-CM

## 2024-07-09 PROCEDURE — G2211 COMPLEX E/M VISIT ADD ON: HCPCS | Performed by: PSYCHIATRY & NEUROLOGY

## 2024-07-09 PROCEDURE — 99215 OFFICE O/P EST HI 40 MIN: CPT | Performed by: PSYCHIATRY & NEUROLOGY

## 2024-07-09 RX ORDER — ARIPIPRAZOLE 15 MG/1
15 TABLET ORAL EVERY MORNING
Qty: 30 TABLET | Refills: 2 | Status: SHIPPED | OUTPATIENT
Start: 2024-07-09 | End: 2024-09-17

## 2024-07-09 ASSESSMENT — PAIN SCALES - GENERAL: PAINLEVEL: MODERATE PAIN (4)

## 2024-07-09 NOTE — PROGRESS NOTES
"      Medical Decision Makin. Schizoaffective disorder, depressive type (H)  Pt reports worsening depressive symptoms (low energy, poor motivation).  Increase Abilify 15mg q am.  Pt will call in 3-4 weeks if no improvement (will increase to 20 mg).  Seroquel 25-50 mg daily prn agitation  Cymbalta 120 mg daily.  melatonin 3 mg nightly.  Prazosin 2 mg nightly    Referred to therapy at transition clinic.    2. Borderline personality disorder  At Dec visit, I called S to refer DBT therapy. [facility] said because pt had past treatment at [facility], they needed to check on eligibility and would call her back.    Pt said \"Never called me\".    Unsigned DA FCC by Conchita Smith, [facility], 3/19/24.  Plan: Referred to therapy at transition clinic.       3. High risk medication use  Risks and benefits of medication discussed (ie metabolic s/e's, TD, CV risk). No involuntary movements noted on MSE.    DISCUS score = 0 (23).  Metabolic conditions treated by PCP.    Labs reviewed: CBC, CMP, lipids (24).  Lipids, A1c, (24).      4.  Nicotine use disorder   Brief intervention done.  Pulmonologist Sen Braxton MD prescribed Chantix.  Says it helps. History of quitting x 1 yr and 3 mos.    5. Epilepsy  Managed by Vikki Benitez MD, [facility]. Last appt in .      Next appt 2-3 mos.  Pt will call in 3-4 weeks if no improvement (will increase abilify to 20 mg).       History of Present Illness:   Valentina Olmedo is a 65yo. female here for SCAD, bipolar type, PTSD, Neurocognitive disorder, seizure disorder here for f/u.  Last seen Feb. Seen w/spouse, René.      Timeline:  24- pt requested decrease seroquel due to wt issues.  23- Abilify started.      Medication history  Past mood stabilizers:  -Seroquel 100 mg nightly stopped 2023 due to weight gain.   -Tegretol 400 mg 3 times daily. Dose decreased by neurology >1 year ago due to falls.  -Lamictal 200mg DC'd due to dizzyness.   " "    In-home services:  -P[facility]  -Med set up q Monday by nurse.  -Food from [facility] meals, [facility]    Weekly INR.    SOC: Lives with  René. Cat, \"Yosi\".   [facility].      ROS:  Denies SI/manic/sx.  See HPI, otherwise negative.      Current Outpatient Medications:     ARIPiprazole (ABILIFY) 15 MG tablet, Take 1 tablet (15 mg) by mouth every morning, Disp: 30 tablet, Rfl: 2    albuterol (PROAIR HFA) 108 (90 Base) MCG/ACT inhaler, Inhale 2 puffs into the lungs every 4 hours as needed for shortness of breath or wheezing, Disp: 8 g, Rfl: 4    azelastine (ASTELIN) 0.1 % nasal spray, , Disp: , Rfl:     Brivaracetam (BRIVIACT) 100 MG tablet, Take 150 mg by mouth 2 times daily , Disp: , Rfl:     carBAMazepine (TEGRETOL) 200 MG tablet, 200mg in the  at lunch and 400mg at HS, Disp: , Rfl:     Cyanocobalamin (VITAMIN B12) 1000 MCG TBCR, Take 1 tablet by mouth, Disp: , Rfl:     denosumab (PROLIA) 60 MG/ML SOSY injection, Inject 60 mg Subcutaneous once Per pt report, Disp: , Rfl:     DULoxetine (CYMBALTA) 60 MG capsule, Take 2 capsules (120 mg) by mouth daily, Disp: 180 capsule, Rfl: 3    EPINEPHrine (ANY BX GENERIC EQUIV) 0.3 MG/0.3ML injection 2-pack, Inject 0.3 mLs (0.3 mg) into the muscle as needed for anaphylaxis, Disp: 2 each, Rfl: 1    esomeprazole (NEXIUM) 40 MG DR capsule, TAKE 1 CAPSULE BY MOUTH EVERY MORNING BEFORE BREAKFAST (TAKE 30-60 MINUTES BEFORE EATING), Disp: 28 capsule, Rfl: 2    fexofenadine (ALLEGRA) 180 MG tablet, TAKE 1 TABLET BY MOUTH DAILY, Disp: 90 tablet, Rfl: 3    fluticasone (FLONASE) 50 MCG/ACT nasal spray, INHALE 1 SPRAY IN EACH NOSTRIL DAILY, Disp: 16 g, Rfl: 2    GAVILYTE-G 236 g suspension, , Disp: , Rfl:     GEMTESA 75 MG TABS tablet, , Disp: , Rfl:     levothyroxine (SYNTHROID/LEVOTHROID) 300 MCG tablet, TAKE 1 TABLET BY MOUTH DAILY, Disp: 28 tablet, Rfl: 12    melatonin 3 MG tablet, Take 1 tablet (3 mg) by mouth nightly as needed for sleep, " Disp: 90 tablet, Rfl: 4    multivitamin with iron (CHROMAGEN) TABS half-tab, Take 1 tablet by mouth daily, Disp: , Rfl:     Nutritional Supplements (NUTRITIONAL SHAKE HIGH PROTEIN) LIQD, Take 1 each by mouth every morning Dispense Premier protein shakes. Replace breakfast with 1 shake daily., Disp: 9900 mL, Rfl: 11    nystatin (MYCOSTATIN) 194002 UNIT/GM external cream, Apply topically 2 times daily as needed (skin infection/inflammation) (Apply to the most sensitive inflamed areas of skin), Disp: 90 g, Rfl: 2    polyethylene glycol (MIRALAX) 17 GM/Dose powder, , Disp: , Rfl:     polymixin b-trimethoprim (POLYTRIM) 27161-5.1 UNIT/ML-% ophthalmic solution, Place 1-2 drops into the right eye every 4 hours, Disp: 10 mL, Rfl: 0    prazosin (MINIPRESS) 2 MG capsule, Take 1 capsule (2 mg) by mouth at bedtime, Disp: 90 capsule, Rfl: 1    prednisoLONE acetate (PRED FORTE) 1 % ophthalmic suspension, , Disp: , Rfl:     pregabalin (LYRICA) 75 MG capsule, Take 1 capsule (75 mg) by mouth 2 times daily for 30 days, Disp: 60 capsule, Rfl: 0    QUEtiapine (SEROQUEL) 25 MG tablet, Take 1-2 tablets (25-50 mg) by mouth daily For agitation., Disp: 180 tablet, Rfl: 4    rosuvastatin (CRESTOR) 20 MG tablet, TAKE 1 TABLET BY MOUTH DAILY, Disp: 90 tablet, Rfl: 2    salmeterol (SEREVENT DISKUS) 50 MCG/ACT inhaler, INHALE 1 PUFF INTO THE LUNGS TWICE A DAY, Disp: 60 each, Rfl: 11    senna (SENNA-TIME) 8.6 MG tablet, TAKE 2 TABLETS (17.2MG) BY MOUTH TWICE A DAY, Disp: 120 tablet, Rfl: 11    SUMAtriptan (IMITREX) 50 MG tablet, Take 1 tablet (50 mg) by mouth at onset of headache for migraine May repeat in 2 hours. Max 4 tablets/24 hours., Disp: 12 tablet, Rfl: 3    umeclidinium (INCRUSE ELLIPTA) 62.5 MCG/ACT inhaler, Inhale 1 puff into the lungs daily, Disp: 30 each, Rfl: 1    varenicline (CHANTIX) 1 MG tablet, TAKE 1 TABLET BY MOUTH TWICE A DAY, Disp: 56 tablet, Rfl: 0    vitamin D3 (CHOLECALCIFEROL) 50 mcg (2000 units) tablet, TAKE 2 TABLETS  "BY MOUTH DAILY, Disp: 180 tablet, Rfl: 3    warfarin ANTICOAGULANT (COUMADIN) 5 MG tablet, TAKE 2 TABLETS BY MOUTH ON MON AND FRI ; TAKE 2 & 1/2 TABLETS BY MOUTH ALL OTHER DAYS AS DIRECTED BY INR CLINIC, Disp: 56 tablet, Rfl: 11    zolpidem (AMBIEN) 5 MG tablet, Take tablet by mouth 15 minutes prior to sleep, for Sleep Study, Disp: 1 tablet, Rfl: 0    Current Facility-Administered Medications:     denosumab (PROLIA) injection 60 mg, 60 mg, Subcutaneous, Q6 Months, Alice Rolle MD, 60 mg at 11/14/23 1445    denosumab (PROLIA) injection 60 mg, 60 mg, Subcutaneous, Q6 Months, Alice Rolle MD, 60 mg at 05/14/24 1516       /62 (BP Location: Left arm, Patient Position: Sitting, Cuff Size: Adult Large)   Pulse 80   Ht 1.676 m (5' 6\")   LMP  (LMP Unknown)   BMI 59.27 kg/m       MSE:      Alert & oriented x 3.   Appearance: Casually dressed  Speech: Normal rate, rhythm and tone.  Gait: Not observed, in w/c.  Musculoskeletal: No abnormal movements. No fasciculations.  Mood/Affect: Dysphoric  Thought Process: Normal rate, perseverative  Thought Content: No suicide or homicide ideation.  Associations: Intact, no delusions.  Perceptions: Denied VH/AH.  Memory: recent and remote memory intact, not formally tested  Attention span and concentration: normal, not formally tested  Language: Intact.  Fund of Knowledge: Normal.  Insight and Judgement: Fair         Total time spent on this encounter, on the date of service, including pre-visit review of separately obtained history, face-to-face interaction performing medically appropriate physical exam, patient counseling/education, interpretation of diagnostic results, care coordination and documentation was 49 minutes.        Breana Puente MD    "

## 2024-07-09 NOTE — TELEPHONE ENCOUNTER
----- Message from Breana Puente sent at 7/9/2024  1:27 PM CDT -----  Regarding: Therapy referal  Transition Clinic Referral   [place]       Please Check Type of Referral Requested:       ____X THERAPY: The Transition clinic is able to schedule patients without current medical insurance; these patient will be referred to our Social Work Care Coordinator for Medical Insurance              Assistance. We are open for referral for psychotherapy. Patient is referred from: Other Dr. Puente, [facility]    N/A  GUARDIAN: If your patient is not their own Guardian, please provide the following:    Guardian Name:    Referring Provider Contact Name: Breana Puente MD; Phone Number: [phone]    Reason for Transition Clinic Referral: Patient is depressed needs therapy now.    Next Level of Care Patient Will Be Transitioned To: Therapist at [facility]    What Would Be Helpful from the Transition Clinic: Help pt with depressive sx.  DBT referral     Needs:NO    Does Patient Have Access to Technology: yes    Patient E-mail Address: e-mail address on record    Current Patient Phone Number: phone numbers on file.      Patient location preference whichever is sooner appt. Pt lives in [place]        Breana Puente MD    (Master Form: Updated 11/28/2023)

## 2024-07-09 NOTE — TELEPHONE ENCOUNTER
First attempt at reaching patient. Left message asking for a return call to schedule with the TC. MyChart message sent. Will postpone for follow up tomorrow.    Debora Velasquez  Transition Clinic Coordinator  07/09/24 1:51 PM

## 2024-07-09 NOTE — PROGRESS NOTES
Mental Health and Collaborative Care Psychiatry Service Rooming Note      Most pressing mental health concern at this time: More depressed, up at night.  Rates depression a 5/10 in the past few weeks  Unable to recall her medications, home nurse sets up her medications  Declined Weight, said she is embarrassed        Any new physical health conditions or diagnoses affecting you that we should be aware of: ED visit on 6/15/24. Has a fall and surgery after      Side effects related to medications patient would like to discuss with the provider:  possible low mood & dizziness      Are you taking your medications as prescribed?  yes        Do you need refills of any of the medications?  No      Are you taking any recreational substances? none      Is there any chance you are pregnant? no  Do you use birth control? hysterectomy      Provider notified  N/A        Lorraine Villegas LPN  July 9, 2024  9:45 AM

## 2024-07-10 ENCOUNTER — ANTICOAGULATION THERAPY VISIT (OUTPATIENT)
Dept: ANTICOAGULATION | Facility: CLINIC | Age: 64
End: 2024-07-10
Payer: COMMERCIAL

## 2024-07-10 ENCOUNTER — TELEPHONE (OUTPATIENT)
Dept: BEHAVIORAL HEALTH | Facility: CLINIC | Age: 64
End: 2024-07-10
Payer: COMMERCIAL

## 2024-07-10 DIAGNOSIS — Z86.711 HISTORY OF PULMONARY EMBOLISM: ICD-10-CM

## 2024-07-10 DIAGNOSIS — Z79.01 LONG TERM (CURRENT) USE OF ANTICOAGULANTS: ICD-10-CM

## 2024-07-10 DIAGNOSIS — I26.99 PULMONARY EMBOLISM WITH INFARCTION (H): Primary | ICD-10-CM

## 2024-07-10 LAB — INR (EXTERNAL): 1.9 (ref 0.9–1.1)

## 2024-07-10 NOTE — TELEPHONE ENCOUNTER
Writer made second attempt call to schedule patient for transition clinic therapy.  Left message for patient to call us back.    Patty    [facility] Providers

## 2024-07-10 NOTE — PROGRESS NOTES
ANTICOAGULATION MANAGEMENT     Valentina Olmedo 64 year old female is on warfarin with subtherapeutic INR result. (Goal INR 2.0-3.0)    Recent labs: (last 7 days)     07/10/24  1530   INR 1.9*       ASSESSMENT     Source(s): Chart Review and Home Care/Facility Nurse     Warfarin doses taken: Warfarin taken as instructed  Diet: No new diet changes identified  Medication/supplement changes: None noted  New illness, injury, or hospitalization: No  Signs or symptoms of bleeding or clotting: No  Previous result: Therapeutic last visit; previously outside of goal range  Additional findings: None       PLAN     Recommended plan for no diet, medication or health factor changes affecting INR     Dosing Instructions: Increase your warfarin dose (8.3% change) with next INR in 1 week       Summary  As of 7/10/2024      Full warfarin instructions:  7.5 mg every Mon, Fri; 10 mg all other days   Next INR check:  7/17/2024               Telephone call with [facility] home care nurse who verbalizes understanding and agrees to plan    Orders given to  Homecare nurse/facility to recheck    Education provided: Please call back if any changes to your diet, medications or how you've been taking warfarin    Plan made per [facility] anticoagulation protocol    Kyleigh Jimenes RN  Anticoagulation Clinic  7/10/2024    _______________________________________________________________________     Anticoagulation Episode Summary       Current INR goal:  2.0-3.0   TTR:  48.7% (1 y)   Target end date:  Indefinite   Send INR reminders to:  [facility]    Indications    History of pulmonary embolism (Resolved) [Z86.711]  Pulmonary embolism with infarction (H) [I26.99]  Long term (current) use of anticoagulants [Z79.01]  History of pulmonary embolism [Z86.711]             Comments:               Anticoagulation Care Providers       Provider Role Specialty Phone number    Promise Vanegas MD Referring Family Medicine [phone]    Donald Rooney MD  Referring Internal Medicine [phone]    Jose Maria Morin MD Referring Family Medicine [phone]

## 2024-07-11 ENCOUNTER — MEDICAL CORRESPONDENCE (OUTPATIENT)
Dept: HEALTH INFORMATION MANAGEMENT | Facility: CLINIC | Age: 64
End: 2024-07-11
Payer: COMMERCIAL

## 2024-07-11 RX ORDER — VARENICLINE TARTRATE 1 MG/1
TABLET, FILM COATED ORAL
Qty: 56 TABLET | Refills: 2 | OUTPATIENT
Start: 2024-07-11

## 2024-07-12 ENCOUNTER — TELEPHONE (OUTPATIENT)
Dept: PALLIATIVE MEDICINE | Facility: OTHER | Age: 64
End: 2024-07-12
Payer: COMMERCIAL

## 2024-07-12 DIAGNOSIS — M54.12 CERVICAL RADICULOPATHY: ICD-10-CM

## 2024-07-12 RX ORDER — PREGABALIN 75 MG/1
75 CAPSULE ORAL 2 TIMES DAILY
Qty: 60 CAPSULE | Refills: 2 | Status: SHIPPED | OUTPATIENT
Start: 2024-07-12

## 2024-07-12 NOTE — TELEPHONE ENCOUNTER
[facility] Call Center    Phone Message    May a detailed message be left on voicemail: yes     Reason for Call: Medication Refill Request    Has the patient contacted the pharmacy for the refill? Yes   Name of medication being requested: regabalin (LYRICA) 75 MG capsule   Provider who prescribed the medication: Laure Dennis  [place] [facility] [place] [address], [address]   Date medication is needed: 5 days       Action Taken: Message routed to:  Other: [facility]    Travel Screening: Not Applicable     Date of Service:

## 2024-07-12 NOTE — TELEPHONE ENCOUNTER
Received call from patient  requesting refill(s) for   pregabalin (LYRICA) 75 MG capsule    Last refilled on: June. 17, 2024         Patient last seen on: May. 20, 2024   Next appt scheduled for: July. 15, 2024       [place]     [facility] [place] [facility] [facility], [address]       Will facilitate refill.      Sri Tabor, Clinic Facilitator  [facility] [place]

## 2024-07-16 ENCOUNTER — ANTICOAGULATION THERAPY VISIT (OUTPATIENT)
Dept: ANTICOAGULATION | Facility: CLINIC | Age: 64
End: 2024-07-16
Payer: COMMERCIAL

## 2024-07-16 DIAGNOSIS — Z86.711 HISTORY OF PULMONARY EMBOLISM: ICD-10-CM

## 2024-07-16 DIAGNOSIS — I26.99 PULMONARY EMBOLISM WITH INFARCTION (H): Primary | ICD-10-CM

## 2024-07-16 DIAGNOSIS — Z79.01 LONG TERM (CURRENT) USE OF ANTICOAGULANTS: ICD-10-CM

## 2024-07-16 LAB — INR (EXTERNAL): 2.1 (ref 0.9–1.1)

## 2024-07-16 NOTE — PROGRESS NOTES
ANTICOAGULATION MANAGEMENT     Valentina Olmedo 64 year old female is on warfarin with therapeutic INR result. (Goal INR 2.0-3.0)    Recent labs: (last 7 days)     07/16/24  1339   INR 2.1*       ASSESSMENT     Source(s): Chart Review and Home Care/Facility Nurse     Warfarin doses taken: Warfarin taken as instructed  Diet: No new diet changes identified  Medication/supplement changes: None noted  New illness, injury, or hospitalization: No  Signs or symptoms of bleeding or clotting: No  Previous result: Subtherapeutic  Additional findings: None       PLAN     Recommended plan for no diet, medication or health factor changes affecting INR     Dosing Instructions: Continue your current warfarin dose with next INR in 2 weeks       Summary  As of 7/16/2024      Full warfarin instructions:  7.5 mg every Mon, Fri; 10 mg all other days   Next INR check:  7/31/2024               Telephone call with [facility] home care nurse who verbalizes understanding and agrees to plan    Orders given to  Homecare nurse/facility to recheck    Education provided: Please call back if any changes to your diet, medications or how you've been taking warfarin  Contact [phone] with any changes, questions or concerns.     Plan made per [facility] anticoagulation protocol    Zari Cheng RN  Anticoagulation Clinic  7/16/2024    _______________________________________________________________________     Anticoagulation Episode Summary       Current INR goal:  2.0-3.0   TTR:  47.9% (1 y)   Target end date:  Indefinite   Send INR reminders to:  [facility]    Indications    History of pulmonary embolism (Resolved) [Z86.711]  Pulmonary embolism with infarction (H) [I26.99]  Long term (current) use of anticoagulants [Z79.01]  History of pulmonary embolism [Z86.711]             Comments:               Anticoagulation Care Providers       Provider Role Specialty Phone number    Promise Vanegas MD Referring Family Medicine [phone]    Toribio  Donald CALLAHAN MD Referring Internal Medicine [phone]    Jose Maria Morin MD Referring Family Medicine [phone]

## 2024-07-18 ENCOUNTER — OFFICE VISIT (OUTPATIENT)
Dept: RHEUMATOLOGY | Facility: CLINIC | Age: 64
End: 2024-07-18
Payer: COMMERCIAL

## 2024-07-18 ENCOUNTER — TELEPHONE (OUTPATIENT)
Dept: PSYCHIATRY | Facility: CLINIC | Age: 64
End: 2024-07-18

## 2024-07-18 VITALS — HEART RATE: 76 BPM | OXYGEN SATURATION: 97 % | SYSTOLIC BLOOD PRESSURE: 124 MMHG | DIASTOLIC BLOOD PRESSURE: 82 MMHG

## 2024-07-18 DIAGNOSIS — F25.1 SCHIZOAFFECTIVE DISORDER, DEPRESSIVE TYPE (H): ICD-10-CM

## 2024-07-18 DIAGNOSIS — E66.813 CLASS 3 SEVERE OBESITY DUE TO EXCESS CALORIES WITH SERIOUS COMORBIDITY AND BODY MASS INDEX (BMI) OF 50.0 TO 59.9 IN ADULT (H): ICD-10-CM

## 2024-07-18 DIAGNOSIS — M15.0 PRIMARY OSTEOARTHRITIS INVOLVING MULTIPLE JOINTS: ICD-10-CM

## 2024-07-18 DIAGNOSIS — M25.50 POLYARTHRALGIA: Primary | ICD-10-CM

## 2024-07-18 DIAGNOSIS — E66.01 CLASS 3 SEVERE OBESITY DUE TO EXCESS CALORIES WITH SERIOUS COMORBIDITY AND BODY MASS INDEX (BMI) OF 50.0 TO 59.9 IN ADULT (H): ICD-10-CM

## 2024-07-18 PROCEDURE — 99214 OFFICE O/P EST MOD 30 MIN: CPT | Performed by: INTERNAL MEDICINE

## 2024-07-18 NOTE — TELEPHONE ENCOUNTER
Reason for Call:  Other call back and prescription    Detailed comments: Patient had an apt with rheumatology, post appointment patient is reporting immediate questions regarding her Cymbalta. Please reach out to patient ASAP.    Phone Number Patient can be reached at: Cell number on file:    Telephone Information:   Mobile [phone]       Best Time: ASAP    Can we leave a detailed message on this number? YES    Call taken on 7/18/2024 at 2:31 PM by Gracie Saleh

## 2024-07-18 NOTE — PROGRESS NOTES
Rheumatology follow-up visit note     Valentina is a 64 year old female presents today for follow-up.    Valentina was seen today for recheck.    Diagnoses and all orders for this visit:    Polyarthralgia    Primary osteoarthritis involving multiple joints    Schizoaffective disorder, depressive type (H)    Class 3 severe obesity due to excess calories with serious comorbidity and body mass index (BMI) of 50.0 to 59.9 in adult (H)        This patient is here for follow-up of polyarthralgias.  She does not have evidence to suggest connective tissue disease/inflammatory joint disease.  She does have beginning of osteoarthritis.  She is already on duloxetine.  She will check with her pain clinic for additional/alternative options.  Management of OA reviewed literature on this is provided.  Nonpharmacologic measures outlined.  There is no clear rheumatologic explanation for her modest elevation of C-reactive protein which can happen in the BMI range that she has.  She is due to be seen in sleep clinic next week for follow-up on getting a better adjustment for her CPAP that she has not been able to use.  She is to follow-up in rheumatology on as-needed basis per her primary physicians.    HPI    Valentina Olmedo is a 64 year old female is here for follow-up of    evaluation of widespread arthralgias, myalgias going on for the past 5 years accompanied by her .  On her previous visit on 4/17/2024 she had further workup her rheumatoid factor anti-CCP antibody x-rays the workup was done.  She has evidence of osteoarthritis in the glenohumeral joints and some of the joints in the hands.  She has negative workup in terms of signals of autoimmunity normal sedimentation rate minimal elevation of C-reactive protein.  She noted pain affecting multiple areas across her body besides her neck trapezius thoracolumbar area which she has well-defined lumbar cervical spondylosis she has noted pain in most joints the worst of which are in  "her hands shoulders and knees.  At this has had troubled her for the past several years some of the symptoms are worse recently.  She has been to [facility] orthopedics and had injection in her knees which she was not sure was not all that helpful.  She also had an injection in the metacarpophalangeal joints she recalls a #2 and 3.  She has background comorbidities including pulmonary embolism for which she is on indefinite anticoagulation therefore not a candidate for nonsteroidals.  The symptoms are worse with activity.  She is unable to wear her wedding band.  There is history of trauma in the past where she \"broke her back\".  She snores if she has sleep apnea she was given CPAP machine however over the past 3 to 4 years she has not been able to use it because it does not feel right or does not tolerate it and is awaiting another 1.  She has no personal or family history of psoriasis ulcerative colitis Crohn's disease.  As per as scheduled there is no family history of lupus rheumatoid arthritis ankylosing spondylitis.  She noted no stomatitis pleuritic symptoms, she had 2 pregnancies 1 D&C and 1 live birth.  As noted she has history of pulmonary embolism, on long-term anticoagulation how that happened is unclear to her.  She has noted difficulty performing a variety of day-to-day activities as reviewed in the questionnaire attached in the chart.  Going she has not had any musculoskeletal surgical procedure apart from the corticosteroid injections.  She has not noted photosensitivity.  She quit smoking a year and a bit ago.        DETAILED EXAMINATION  07/18/24  :    Vitals:    07/18/24 1233   BP: 124/82   BP Location: Left arm   Pulse: 76   SpO2: 97%     Alert oriented. Head including the face is examined for malar rash, heliotropes, scarring, lupus pernio. Eyes examined for redness such as in episcleritis/scleritis, periorbital lesions.   Neck/ Face examined for parotid gland swelling, range of motion of neck. "  Left upper and lower and right upper and lower extremities examined for tenderness, swelling, warmth of the appendicular joints, range of motion, edema, rash.  Some of the important findings included: she does not have evidence of synovitis in the palpable joints of the upper extremities.  No significant deformities of the digits.  no Heberden nodes.  Range of motion of the shoulders   show full abduction.  No JLT effusion or warmth of the knees.  she does not have dactylitis of the digits.     Patient Active Problem List    Diagnosis Date Noted    Nicotine use disorder 07/09/2024     Priority: Medium    Hematoma of right lower leg 06/24/2024     Priority: Medium    History of colonic polyps 05/21/2024     Priority: Medium    Chronic intractable pain 04/02/2024     Priority: Medium    Strain of lumbar paraspinal muscle, initial encounter 04/02/2024     Priority: Medium    Cervical stenosis of spinal canal 04/02/2024     Priority: Medium    Cervical radiculopathy 04/02/2024     Priority: Medium    Intertrigo 01/03/2024     Priority: Medium    Dysfunction of both eustachian tubes 01/03/2024     Priority: Medium    Medial knee pain, right 11/17/2023     Priority: Medium    Bilateral sensorineural hearing loss 11/17/2023     Priority: Medium    History of pulmonary embolism 07/28/2023     Priority: Medium    Problem related to housing and economic circumstances 07/25/2023     Priority: Medium    Bilateral hand pain 05/26/2023     Priority: Medium    Vision changes 03/09/2023     Priority: Medium    Seasonal affective disorder (H24) 02/19/2023     Priority: Medium    History of melanoma 10/28/2022     Priority: Medium    Long term (current) use of anticoagulants 08/22/2022     Priority: Medium    PTSD (post-traumatic stress disorder) 08/12/2022     Priority: Medium    Physical deconditioning 03/16/2022     Priority: Medium    Idiopathic osteoporosis 10/12/2021     Priority: Medium    Pulmonary embolism with infarction  (H) 09/24/2021     Priority: Medium    Peripheral polyneuropathy 08/20/2021     Priority: Medium    Venous stasis 08/08/2021     Priority: Medium    Class 3 severe obesity due to excess calories with serious comorbidity and body mass index (BMI) of 50.0 to 59.9 in adult (H) 08/06/2021     Priority: Medium    Mixed hyperlipidemia 08/06/2021     Priority: Medium    Seasonal and perennial allergic rhinoconjunctivitis of right eye 07/29/2021     Priority: Medium    Essential hypertension 12/22/2020     Priority: Medium    Fracture of vertebra due to osteoporosis with routine healing, subsequent encounter 09/30/2020     Priority: Medium    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 According to the patient, the tumor was benign. She is not sure whether or not the tumor belonged to the thyroid gland. The surgery was done here at the [place]. In January 2011, she was diagnosed with kidney stones. She was found to have an elevated calcium level of 11.7, with a PTH level of 368. Stone analysis showed calcium oxalate, calcium phosphate and carbonate form of calcium phosphate hydroxyl. She was also found to have vitamin D deficiency with vitamin D levels between 20 and 25 ng per mL and was given 50,000 units ergocalciferol on a weekly basis. She's been taking the high dose vitamin D for over a year now. Most recent labs from October only checked vitamin D3 level which was mildly low at 23.    Thyroid sestamibi scan and neck ultrasound showed enlarged parathyroid adenoma measuring 2.5 cm on the right side. In addition, on the left side, there was a description of a hypoechoic ovoid focus superior and lateral to the left lobe of the thyroid, measuring 1.7 cm, which was considered to represent either a lymph node or another parathyroid  adenoma. She underwent completion total thyroidectomy and right superior parathyroidectomy at [facility] with Dr. Keily Gregory on 11/19/11. 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 She is currently on 237  g levothyroxine daily. Last time the dose of levothyroxine was changed was in March 2012. Last TSH from 6/13/12 was 0.42.      Nonintractable epilepsy without status epilepticus (H) 01/07/2020     Priority: Medium    Esophageal reflux 01/07/2020     Priority: Medium    Chronic obstructive pulmonary disease (H) 01/07/2020     Priority: Medium    Schizoaffective disorder, depressive type (H) 02/06/2019     Priority: Medium    DNR (do not resuscitate) 09/13/2018     Priority: Medium    CHRIS (obstructive sleep apnea) 06/12/2018     Priority: Medium    Major depressive disorder, recurrent episode, moderate (H) 09/29/2015     Priority: Medium    Closed head injury 03/04/2015     Priority: Medium     Created by [facility] Annotation: Apr 2 2012  9:53AM - Mariah Roy: Fell at work.      HLD (hyperlipidemia) 11/12/2013     Priority: Medium    Hypothyroidism 11/12/2013     Priority: Medium    Migraine headache 11/12/2013     Priority: Medium    Adrenal mass, left (H24) 09/17/2013     Priority: Medium     Past Surgical History:   Procedure Laterality Date    ABDOMEN SURGERY      ADRENALECTOMY Left     BRAIN SURGERY      COLONOSCOPY N/A 5/7/2024    Procedure: WITH COLONOSCOPY with polypectomies;  Surgeon: Walt Lira MD;  Location: [facility] Main OR    CRANIOTOMY FOR TEMPORAL LOBECTOMY Right     x3 for seizure    DILATION AND CURETTAGE      ESOPHAGOSCOPY, GASTROSCOPY, DUODENOSCOPY (EGD), COMBINED N/A 5/7/2024    Procedure: and biopsies;  Surgeon: Walt Lira MD;  Location: [facility] Main OR    ESOPHAGOSCOPY, GASTROSCOPY, DUODENOSCOPY (EGD), DILATATION, COMBINED N/A 5/7/2024    Procedure:  ESOPHAGOGASTRODUODENOSCOPY with esophageal dilation;  Surgeon: Walt Lira MD;  Location: [facility] Main OR    HEAD & NECK SURGERY      HYSTERECTOMY, PAP NO LONGER INDICATED N/A     LITHOTRIPSY      PARATHYROID GLAND SURGERY      resection of adenoma    REFRACTIVE SURGERY Bilateral     RELEASE CARPAL TUNNEL Bilateral     TONSILLECTOMY & ADENOIDECTOMY      TOTAL THYROIDECTOMY      TOTAL VAGINAL HYSTERECTOMY      39 years old. Benign      Past Medical History:   Diagnosis Date    Adrenal mass (H24) 10/12/2015    Chen Null MD  They were incidentally discovered on the CAT scan of the abdomen from December 2011 which was done for evaluation of kidney stones. F/up CT of the abdomen done on 6/26/12 showed a stable 5 mm nodular opacity in the right lower lung lobe, adjacent to the diagram. Bilateral adrenal masses average density measured less than 0 Hounsfield units, consistent with benign etio    Age-related cataract of both eyes, unspecified age-related cataract type 03/27/2024    Anxiety     Anxiety     Arthritis     Borderline personality disorder (H)     Cancer (H)     COPD (chronic obstructive pulmonary disease) (H)     Depression     Depressive disorder     History of COVID-19     Hyperlipidemia     Hypertension     Hypertension     Hyponatremia     Hypothyroidism     Malignant neoplasm of thyroid gland (H)     Chen Null MD  She had R hemithyroidectomy for a right neck tumor in 1994. According to the patient, the tumor was benign. She is not sure whether or not the tumor belonged to the thyroid gland. The surgery was done here at the [place]. In January 2011, she was diagnosed with kidney stones. She was found to have an elevated calcium level of 11.7, with a PTH level of 368. Stone an    Primary hyperparathyroidism (H24)           PTSD (post-traumatic stress disorder)     Pulmonary embolism with infarction (H) 01/12/2021    Recurrent otitis media     Seizure disorder (H)      Sleep apnea     cpap at [facility]    Stroke (H)     Tobacco abuse     Vertigo      Allergies   Allergen Reactions    Aspirin Shortness Of Breath    Bees Anaphylaxis    Lamictal [Lamotrigine] Dizziness    Latex Itching    Phenobarbital      aggression    Vistaril [Hydroxyzine] Other (See Comments)     Sluggish, unable to wake up    Gabapentin Rash     Current Outpatient Medications   Medication Sig Dispense Refill    albuterol (PROAIR HFA) 108 (90 Base) MCG/ACT inhaler Inhale 2 puffs into the lungs every 4 hours as needed for shortness of breath or wheezing 8 g 4    ARIPiprazole (ABILIFY) 15 MG tablet Take 1 tablet (15 mg) by mouth every morning 30 tablet 2    azelastine (ASTELIN) 0.1 % nasal spray       Brivaracetam (BRIVIACT) 100 MG tablet Take 150 mg by mouth 2 times daily       carBAMazepine (TEGRETOL) 200 MG tablet 200mg in the  at lunch and 400mg at HS      Cyanocobalamin (VITAMIN B12) 1000 MCG TBCR Take 1 tablet by mouth      denosumab (PROLIA) 60 MG/ML SOSY injection Inject 60 mg Subcutaneous once Per pt report      DULoxetine (CYMBALTA) 60 MG capsule Take 2 capsules (120 mg) by mouth daily 180 capsule 3    EPINEPHrine (ANY BX GENERIC EQUIV) 0.3 MG/0.3ML injection 2-pack Inject 0.3 mLs (0.3 mg) into the muscle as needed for anaphylaxis 2 each 1    esomeprazole (NEXIUM) 40 MG DR capsule TAKE 1 CAPSULE BY MOUTH EVERY MORNING BEFORE BREAKFAST (TAKE 30-60 MINUTES BEFORE EATING) 28 capsule 2    fexofenadine (ALLEGRA) 180 MG tablet TAKE 1 TABLET BY MOUTH DAILY 90 tablet 3    fluticasone (FLONASE) 50 MCG/ACT nasal spray INHALE 1 SPRAY IN EACH NOSTRIL DAILY 16 g 2    GAVILYTE-G 236 g suspension       GEMTESA 75 MG TABS tablet       levothyroxine (SYNTHROID/LEVOTHROID) 300 MCG tablet TAKE 1 TABLET BY MOUTH DAILY 28 tablet 12    melatonin 3 MG tablet Take 1 tablet (3 mg) by mouth nightly as needed for sleep 90 tablet 4    multivitamin with iron (CHROMAGEN) TABS half-tab Take 1 tablet by mouth daily      Nutritional  Supplements (NUTRITIONAL SHAKE HIGH PROTEIN) LIQD Take 1 each by mouth every morning Dispense Premier protein shakes. Replace breakfast with 1 shake daily. 9900 mL 11    nystatin (MYCOSTATIN) 103643 UNIT/GM external cream Apply topically 2 times daily as needed (skin infection/inflammation) (Apply to the most sensitive inflamed areas of skin) 90 g 2    polyethylene glycol (MIRALAX) 17 GM/Dose powder       polymixin b-trimethoprim (POLYTRIM) 58348-8.1 UNIT/ML-% ophthalmic solution Place 1-2 drops into the right eye every 4 hours 10 mL 0    prazosin (MINIPRESS) 2 MG capsule Take 1 capsule (2 mg) by mouth at bedtime 90 capsule 1    prednisoLONE acetate (PRED FORTE) 1 % ophthalmic suspension       pregabalin (LYRICA) 75 MG capsule Take 1 capsule (75 mg) by mouth 2 times daily 60 capsule 2    QUEtiapine (SEROQUEL) 25 MG tablet Take 1-2 tablets (25-50 mg) by mouth daily For agitation. 180 tablet 4    rosuvastatin (CRESTOR) 20 MG tablet TAKE 1 TABLET BY MOUTH DAILY 90 tablet 2    salmeterol (SEREVENT DISKUS) 50 MCG/ACT inhaler INHALE 1 PUFF INTO THE LUNGS TWICE A DAY 60 each 11    senna (SENNA-TIME) 8.6 MG tablet TAKE 2 TABLETS (17.2MG) BY MOUTH TWICE A  tablet 11    SUMAtriptan (IMITREX) 50 MG tablet Take 1 tablet (50 mg) by mouth at onset of headache for migraine May repeat in 2 hours. Max 4 tablets/24 hours. 12 tablet 3    umeclidinium (INCRUSE ELLIPTA) 62.5 MCG/ACT inhaler Inhale 1 puff into the lungs daily 30 each 1    varenicline (CHANTIX) 1 MG tablet TAKE 1 TABLET BY MOUTH TWICE A DAY 56 tablet 0    vitamin D3 (CHOLECALCIFEROL) 50 mcg (2000 units) tablet TAKE 2 TABLETS BY MOUTH DAILY 180 tablet 3    warfarin ANTICOAGULANT (COUMADIN) 5 MG tablet TAKE 2 TABLETS BY MOUTH ON MON AND FRI ; TAKE 2 & 1/2 TABLETS BY MOUTH ALL OTHER DAYS AS DIRECTED BY INR CLINIC 56 tablet 11    zolpidem (AMBIEN) 5 MG tablet Take tablet by mouth 15 minutes prior to sleep, for Sleep Study 1 tablet 0     family history includes  Alcoholism in her brother and sister; Chronic Obstructive Pulmonary Disease in her mother; Depression in her sister; Diabetes in her sister; Lung Cancer (age of onset: 76) in her maternal grandfather; No Known Problems in her sister; Other - See Comments in her brother and father; Skin Cancer in her father.  Social Connections: Not on file          WBC Count   Date Value Ref Range Status   05/30/2024 4.5 4.0 - 11.0 10e3/uL Final     RBC Count   Date Value Ref Range Status   05/30/2024 4.89 3.80 - 5.20 10e6/uL Final     Hemoglobin   Date Value Ref Range Status   05/30/2024 13.1 11.7 - 15.7 g/dL Final     Hematocrit   Date Value Ref Range Status   05/30/2024 40.0 35.0 - 47.0 % Final     MCV   Date Value Ref Range Status   05/30/2024 82 78 - 100 fL Final     MCH   Date Value Ref Range Status   05/30/2024 26.8 26.5 - 33.0 pg Final     Platelet Count   Date Value Ref Range Status   05/30/2024 229 150 - 450 10e3/uL Final     % Lymphocytes   Date Value Ref Range Status   05/30/2024 28 % Final     AST   Date Value Ref Range Status   05/01/2024 17 0 - 45 U/L Final     Comment:     Reference intervals for this test were updated on 6/12/2023 to more accurately reflect our healthy population. There may be differences in the flagging of prior results with similar values performed with this method. Interpretation of those prior results can be made in the context of the updated reference intervals.   11/13/2015 27 0 - 45 U/L Final     ALT   Date Value Ref Range Status   05/01/2024 23 0 - 50 U/L Final     Comment:     Reference intervals for this test were updated on 6/12/2023 to more accurately reflect our healthy population. There may be differences in the flagging of prior results with similar values performed with this method. Interpretation of those prior results can be made in the context of the updated reference intervals.     11/13/2015 50 0 - 50 U/L Final     Albumin   Date Value Ref Range Status   05/01/2024 4.0 3.5 - 5.2  g/dL Final   08/20/2021 3.6 3.5 - 5.0 g/dL Final   11/13/2015 3.5 3.4 - 5.0 g/dL Final     Alkaline Phosphatase   Date Value Ref Range Status   05/01/2024 67 40 - 150 U/L Final     Comment:     Reference intervals for this test were updated on 11/14/2023 to more accurately reflect our healthy population. There may be differences in the flagging of prior results with similar values performed with this method. Interpretation of those prior results can be made in the context of the updated reference intervals.   11/13/2015 128 40 - 150 U/L Final     Creatinine   Date Value Ref Range Status   05/30/2024 0.85 0.51 - 0.95 mg/dL Final   11/12/2015 0.67 0.52 - 1.04 mg/dL Final     GFR Estimate   Date Value Ref Range Status   05/30/2024 77 >60 mL/min/1.73m2 Final   06/29/2021 >60 >60 mL/min/1.73m2 Final   11/12/2015 >90  Non  GFR Calc   >60 mL/min/1.7m2 Final     GFR Estimate If Black   Date Value Ref Range Status   06/29/2021 >60 >60 mL/min/1.73m2 Final   11/12/2015 >90   GFR Calc   >60 mL/min/1.7m2 Final     Creatinine Urine mg/dL   Date Value Ref Range Status   09/07/2023 65.5 mg/dL Final     Comment:     The reference ranges have not been established in urine creatinine. The results should be integrated into the clinical context for interpretation.     Erythrocyte Sedimentation Rate   Date Value Ref Range Status   04/17/2024 19 0 - 30 mm/hr Final     N terminal Pro BNP Inpatient   Date Value Ref Range Status   05/30/2024 246 0 - 900 pg/mL Final     Comment:     Reference range shown and results flagged as abnormal are suggested inpatient cut points for confirming diagnosis if CHF in an acute setting. Establishing a baseline value for each individual patient is useful for follow-up. An inpatient or emergency department NT-proPBNP <300 pg/mL effectively rules out acute CHF, with 99% negative predictive value.    The outpatient non-acute reference range for ruling out CHF is:  0-125 pg/mL  (age 18 to less than 75)  0-450 pg/mL (age 75 yrs and older)

## 2024-07-18 NOTE — TELEPHONE ENCOUNTER
RN received notification from the [facility] Coordinators that this patient had called requesting a call back regarding her Cymbalta.       RN phoned and spoke to the patient.      The patient reported that she has a appointment today with her Rheumatologist.    The patient then requested that her Cymbalta dosing be increased to manage the pain from her osteoarthritis.  RN asked the patient if this was recommended by  Rheumatologist.  The patient verbalized that he recommended this and to talk to Dr. Puente about this.    RN reminded the patient that Dr. Puente is not in until next Tuesday.  The patient was aware of this and asked that Dr. Puente please review this request and reach out to her with her decision on 7/23/24.  RN instructed Valentina to wait for Dr. Puente's reply.      2.  Dr. Bhatt's 7/18/24 visit note is in Epic.      Adalid Gonzales RN on 7/18/2024 at 3:32 PM

## 2024-07-20 ENCOUNTER — MEDICAL CORRESPONDENCE (OUTPATIENT)
Dept: HEALTH INFORMATION MANAGEMENT | Facility: CLINIC | Age: 64
End: 2024-07-20

## 2024-07-22 ENCOUNTER — OFFICE VISIT (OUTPATIENT)
Dept: PALLIATIVE MEDICINE | Facility: OTHER | Age: 64
End: 2024-07-22
Attending: NURSE PRACTITIONER
Payer: COMMERCIAL

## 2024-07-22 VITALS — DIASTOLIC BLOOD PRESSURE: 86 MMHG | OXYGEN SATURATION: 98 % | SYSTOLIC BLOOD PRESSURE: 126 MMHG | HEART RATE: 73 BPM

## 2024-07-22 DIAGNOSIS — E66.01 CLASS 3 SEVERE OBESITY DUE TO EXCESS CALORIES WITH SERIOUS COMORBIDITY AND BODY MASS INDEX (BMI) OF 50.0 TO 59.9 IN ADULT (H): ICD-10-CM

## 2024-07-22 DIAGNOSIS — M25.561 MEDIAL KNEE PAIN, RIGHT: Primary | ICD-10-CM

## 2024-07-22 DIAGNOSIS — M25.562 LEFT KNEE PAIN, UNSPECIFIED CHRONICITY: ICD-10-CM

## 2024-07-22 DIAGNOSIS — M54.12 CERVICAL RADICULOPATHY: ICD-10-CM

## 2024-07-22 DIAGNOSIS — G89.29 CHRONIC INTRACTABLE PAIN: ICD-10-CM

## 2024-07-22 DIAGNOSIS — E66.813 CLASS 3 SEVERE OBESITY DUE TO EXCESS CALORIES WITH SERIOUS COMORBIDITY AND BODY MASS INDEX (BMI) OF 50.0 TO 59.9 IN ADULT (H): ICD-10-CM

## 2024-07-22 DIAGNOSIS — R53.81 PHYSICAL DECONDITIONING: ICD-10-CM

## 2024-07-22 DIAGNOSIS — G62.0 POLYNEUROPATHY DUE TO DRUG (H): ICD-10-CM

## 2024-07-22 PROCEDURE — G0463 HOSPITAL OUTPT CLINIC VISIT: HCPCS | Performed by: NURSE PRACTITIONER

## 2024-07-22 PROCEDURE — G2211 COMPLEX E/M VISIT ADD ON: HCPCS | Performed by: NURSE PRACTITIONER

## 2024-07-22 PROCEDURE — 99215 OFFICE O/P EST HI 40 MIN: CPT | Performed by: NURSE PRACTITIONER

## 2024-07-22 ASSESSMENT — PAIN SCALES - GENERAL: PAINLEVEL: MODERATE PAIN (4)

## 2024-07-22 NOTE — PROGRESS NOTES
Date:07/22/2024      COMPREHENSIVE PAIN CLINIC FOLLOW UP EVALUATION    I had the pleasure of meeting Ms. Valentina Olmedo on 11/7/2023 in the [facility] in consult for [facility] with regards to her pain.  The patient is a 63 year old female with past medical history of chronic anticoagulation, h/o PE, physical deconditioning, CHRIS, GERD, seizure disorder, h/o CVA, cervical stenosis, chronic intractable pain, severe morbid obesity, schizoaffective disorder/depression, borderline personality disorder, PTSD, osteoporosis on Prolia, who presents for evaluation of chronic pain.  Patient is on warfarin.      Updates since last appointment on 05/20/2024.  She presents in a wheel chair with Al. She is confused about her dosages of medications.  She thinks she did not increase lyrica to 75mg TID due to dizziness.    Having more knee pain R>L.  She follows with [facility]. Constant pain that varies in intensity. Dull to achey and sharp. Improved with ice and rest.  Worse with walking.  She would like to do PT through [facility].    [facility] Follow-up with Lou Swanson PA-C unknown - last appt 04/2024 - can't remember when last injection for her neck was.    Dr. Puente, Psychiatrist - patient asking about seroquel doseage change      Interventions/Injections:   none    Progress Notes Reviewed:  07/18/2024 Dr. Bhatt Rheumatology -   does not have evidence to suggest connective tissue disease/inflammatory joint disease. She does have beginning of osteoarthritis.   07/09/2024 Breana Puente, Psychiatry - schizoaffective disorder, borderline personality disorder, PTSD, seizure disorder  06/17/2024 Dr. Ana Mancilla, General surgery -  R) upper lateral thigh hematoma      Any hospitalizations/ER/UC visits since last appointment:  no  Any falls/accidents since last appointment:  no      Primary Pain :   Today b/l knee pain R>L     2.    Patient endorses chronic pain in cervical spine L>R that started years ago  "without a precipitating event.  Pain radiates down b/l arms R>L and into her hands.       Characteristics:  Any changes in pain characteristics since last appointment?  no  Cervical pain  Her arms feel weak.  Patient has constant numbness and tingling in b/l fingers.  Patient has not had any spine surgery in the past.  The patient describes the pain as constant, sharp, pressure, heavy, throbbing, dull, shooting.       What makes the pain better:  She reports that the pain is made better by \"unknown\".   What makes the pain worse:   She reports that the pain is made worse by \"unknown\".   07/22/2024 current pain on 0/10 VAS:   7    Worst pain:  9   Best pain: 3  05/20/2024 current pain on 0/10 VAS:   7  Worst pain:   9    Best pain: 1  04/09/2024 current pain on 0/10 VAS:   6   Worst pain:  9    Best pain: 1       11/08/2023 current pain score at 4/10, but it can be as low as 4/10 or as severe as 9/10.        Current Pain Related Medications:  RN sets up medications once a week.    Pregabalin 75mg TID - currently titrating to a goal of 100mg TID  Tegretol 200mg am and 400pm and 200mg q hs  Duloxetine 60mg 2 caps daily  Meclizine 25mg 1/2 to 1 tab prn dizziness - discontinued  Melatonin 3mg q hs  Oxybutynin ER  Prazosin 2mg 1 tab q hs  Seroquel 25mg BID - may increase in the future  Chantix 1mg BID  Warfarin 5mg per anticoagulation clinic   Briviact 100mg TID - epilepsy    Therapies discuss on initial consult:   Physical therapy, Pain Psychology, TENs unit, Grounding Mat, Frequency Specific Micro Current, Anti-inflammatory Lifestyle    Employment:  On [employer].     Exercise:  Last PT was 3-4 months ago.  She continues to do exercises on a regular basis.  Patient is attending hand therapy [facility] twice weekly.      Hobbies:  She does her HEP.    Mental Health:    Patient endorses chronic anxiety and depression.  Patient does follow with a Breana Morgan mental health care provider every few month.              Updates since last " appointment on 04/09/2024.  Presents with AL, Significant other with a wheelchair.  .  Last script for lyrica 50mg TID 84 tabs for 28 days.  Side Effects: none will increase today 75mg TID    Tens unit: [employer] brochure for patient support provided.  CT scan 07/24/2024 04/17/2024 Dr. Bhatt, Rheumatology - blood work ordered    PT at [facility]    She has been referred for pulmonary rehab.    RN comes every Tuesday to refill her medications.      Employment:  On [employer].     Exercise:  Last PT was 3-4 months ago.  She continues to do exercises on a regular basis.  Patient is attending hand therapy [facility] twice weekly.      Hobbies:  She does her HEP.    Mental Health:    Patient endorses chronic anxiety and depression.  Patient does follow with a Breana Morgan, mental health care provider every few month.      04/08/2025 [facility]TA C7-T1 at [facility].  [facility] Center recommends  for a repeat CT cervical spine in 1 year to monitor the densely calcified lesion. This is scheduled for July 24, 2024.     She is attending PT.    B/l occipital nerve blocks with Dr. Zeng - not done    She has been referred to [facility] for her b/l knee pain.  Referred to Neurology for evaluate peripheral neuropathy and epilepsy.   07/06/2023 Dr. Jacobsen recommends weight loss prior to consideration of elective spinal surgery with goal BMI less than 40.         Interventions/Injections:     Progress Notes Reviewed:  05/14/2024 [facility] Norwood [facility]  We discussed medial branch blocks as a workup for radiofrequency ablation today.  We decided that she would wait for her updated CT cervical spine first.  If the calcified lesion has changed, she would need to follow-up with Dr. Jacobsen.  If it is stable, we could consider medial branch blocks.  Based on her exam today I would recommend left-sided medial branch blocks in the mid cervical region.  - As long as patient has at least 50% relief of pain for at least 3 months we can also repeat the repeat C7-T1  "interlaminar epidural steroid injection.      Any hospitalizations/ER/UC visits since last appointment:  no  Any falls/accidents since last appointment:  no      Primary Pain :  Patient endorses chronic pain in cervical spine L>R that started years ago without a precipitating event.  Pain radiates down b/l arms R>L and into her hands.       Characteristics:  No changes in pain characteristics since last appointment.      Her arms feel weak.  Patient has constant numbness and tingling in b/l fingers.  Patient has not had any spine surgery in the past.  The patient describes the pain as constant, sharp, pressure, heavy, throbbing, dull, shooting.       What makes the pain better:  She reports that the pain is made better by \"unknown\".   What makes the pain worse:   She reports that the pain is made worse by \"unknown\".   05/20/2024 current pain on 0/10 VAS:   7  Worst pain:   9   Best pain: 1  04/09/2024 current pain on 0/10 VAS:   6   Worst pain:  9     Best pain: 1       11/08/2023 current pain score at 4/10, but it can be as low as 4/10 or as severe as 9/10.      Current Pain Related Medications:  Any medications changes since last appointment:    Pregabalin 50mg TID - currently titrating to a goal of 100mg TID  Tegretol 200mg am and 400pm and 200mg q hs  Duloxetine 60mg 2 caps daily  Meclizine 25mg 1/2 to 1 tab prn dizziness - discontinued  Melatonin 3mg q hs  Oxybutynin ER  Prazosin 2mg 1 tab q hs  Seroquel 25mg BID  Chantix 1mg BID  Warfarin 5mg per anticoagulation clinic   Briviact 100mg TID - epilepsy      NOTE:  gabapentin causes rash, Lamictal causes dizziness, Pregabalin 25mg TID  Tegretol 200mg am and 400pm and 200mg q hs  Duloxetine 60mg 2 caps daily  Meclizine 25mg 1/2 to 1 tab prn dizziness  Melatonin 3mg q hs  Oxybutynin ER  Prasoin 2mg 1 tab q hs  Seroquel 25mg BID  Chantix 1mg BID  Warfarin 5mg per anticoagulation clinic   Briviact 100mg TID - epilepsy      NOTE:  gabapentin causes rash, " "Lamictal causes dizziness, Tizanidine 4mg TID - discontinued      Therapies discuss on initial consult:   Physical therapy, Pain Psychology, TENs unit, Grounding Mat, Frequency Specific Micro Current, Anti-inflammatory Lifestyle    Social:  Patient is  to Al and lives in an apartment in [place].  She is independent in ADL's.  She has a cat.    Employment: [employer]    Exercise:  Last PT was 3-4 months ago.  She continues to do exercises on a regular basis.  Patient is attending hand therapy [facility] twice weekly.    Hobbies:    Mental Health:    Patient endorses chronic anxiety and depression.  Patient does follow with a Breana Morgan mental health care provider every few months.            Plan on 04/09/2024:  Increase lyrica by 1 25mg tab every 3 days until taking lyrica 25mg 2 tabs TID to help manage neuropathic pain.      Follow-up in clinic in 1 month to re-evaluate neuropathic pain.             -------------------------------------------------------------------------------------------------------------------------------------  History of pain on initial consult 11/07/2023  Subjective: She presents with Al in a wheel chair.    Patient endorses chronic pain in cervical spine L>R that started years ago without a precipitating event.  Pain radiates down b/l arms R>L and into her hands.  Her arms feel weak.  Patient has constant numbness and tingling in b/l fingers .  Patient has not had any spine surgery in the past.  The patient describes the pain as constant, sharp, heavy, throbbing, dull, shooting.  She reports that the pain is made worse by \"unknown\".  Her pain is improved with \"unknown\".  She rates her currenty pain score at 4/10, but it can be as low as 4/10 or as severe as 9/10. She is scheduled [facility] on 11/09/2023.  She uses a wheel chair at times due to injury to low back from a fall. She fx her L) elbow in September.      Patient denies any anxiety or depression.  Patient does " follow with a Breana Morgan, mental health care provider every few month.  Last PT was 3-4 months ago.  She continues to do exercises on a regular basis.  Patient is attending hand therapy [facility] twice weekly.    Progress Notes Reviewed:  10/18/2023 Dr. Gracie Jacobsen, Neurological Surgery - cervical stenosis/chronic pain.  She needs to lose weight prior to surgery.  Discussed C7-T1 VIOLETA.  She wants to avoid surgery as long as she can.  10/17/2023 Dr. Mariann Puente -   10/13/2023 Lou Norwood, [facility]    She denies any new problems with falls or balance, any new numbness or weakness of the arms or legs, any new bowel or bladder incontinence, any night sweats or unexplained fevers, or any sudden or unexpected weight loss.      Valentina Olmedo has not been seen at a pain clinic in the past.        Current Treatments:  She does not know her medication without looking them up on her phone.  Pregabalin 25mg TID per Lou Norwood, [facility]  Tegretol 200mg am and pm and 400mg q hs per Dr. Benitez for epilepsy  Meclizine 25mg 1/2 to 1 tab BID-TID dizziness - vertigo  Melatonin 3mg q hs  Oxybutynin ER - overactive bladder  Prasoin 2mg 1 tab q hs per Dr. Puente  Seroquel 25mg BID per Dr. Puente  Tizanidine 4mg TID  Chantix 1mg BID  Warfarin 5mg per anticoagulation clinic   Briviact 100mg TID - epilepsy    NOTE:  gabapentin causes rash, Lamictal causes dizziness    Previous Medication Treatments Included:  Anti-convulsants: gabapentin causes rash  Muscle relaxors:   Anti-depressants: Lamictal causes dizziness, Duloxetine 60mg 2 caps daily per Dr. Puente  Benzodiazapine's:   Acetaminophen/NSAIDs: No NSAID due to warfarin, acetaminophen is not effective  Topicals: biofreeze causes itching, burning,  Opioids: not suppose to take opioids with warfarin  Coumadin    Other Treatments Have Included:  Physical therapy: PT 3 months ago, currently in hand therapy at [facility]  Pain Psychology:   Chiropractic:   Acupuncture:   TENs  Unit:   Injections:   Surgeries:   Dry Needling:   Massage:      Past Medical History:  Medical history reviewed.  Past Medical History:   Diagnosis Date    Adrenal mass (H24) 10/12/2015    Chen Null MD  They were incidentally discovered on the CAT scan of the abdomen from December 2011 which was done for evaluation of kidney stones. F/up CT of the abdomen done on 6/26/12 showed a stable 5 mm nodular opacity in the right lower lung lobe, adjacent to the diagram. Bilateral adrenal masses average density measured less than 0 Hounsfield units, consistent with benign etio    Age-related cataract of both eyes, unspecified age-related cataract type 03/27/2024    Anxiety     Anxiety     Arthritis     Borderline personality disorder (H)     Cancer (H)     COPD (chronic obstructive pulmonary disease) (H)     Depression     Depressive disorder     History of COVID-19     Hyperlipidemia     Hypertension     Hypertension     Hyponatremia     Hypothyroidism     Malignant neoplasm of thyroid gland (H)     Chen Null MD  She had R hemithyroidectomy for a right neck tumor in 1994. According to the patient, the tumor was benign. She is not sure whether or not the tumor belonged to the thyroid gland. The surgery was done here at the [facility]. In January 2011, she was diagnosed with kidney stones. She was found to have an elevated calcium level of 11.7, with a PTH level of 368. Stone an    Primary hyperparathyroidism (H24)           PTSD (post-traumatic stress disorder)     Pulmonary embolism with infarction (H) 01/12/2021    Recurrent otitis media     Seizure disorder (H)     Sleep apnea     cpap at [facility]    Stroke (H)     Tobacco abuse     Vertigo       Patient Active Problem List   Diagnosis    Essential hypertension    CHRIS (obstructive sleep apnea)    Seasonal and perennial allergic rhinoconjunctivitis of right eye    Closed head injury    History of thyroid cancer    Adrenal mass, left (H24)     Class 3 severe obesity due to excess calories with serious comorbidity and body mass index (BMI) of 50.0 to 59.9 in adult (H)    Nonintractable epilepsy without status epilepticus (H)    Esophageal reflux    Mixed hyperlipidemia    HLD (hyperlipidemia)    Hypothyroidism    Chronic obstructive pulmonary disease (H)    Schizoaffective disorder, depressive type (H)    DNR (do not resuscitate)    Migraine headache    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)    Fracture of vertebra due to osteoporosis with routine healing, subsequent encounter    Major depressive disorder, recurrent episode, moderate (H)    Venous stasis    Peripheral polyneuropathy    Pulmonary embolism with infarction (H)    Idiopathic osteoporosis    Physical deconditioning    PTSD (post-traumatic stress disorder)    Long term (current) use of anticoagulants    History of melanoma    Seasonal affective disorder (H24)    Vision changes    Bilateral hand pain    Problem related to housing and economic circumstances    History of pulmonary embolism    Medial knee pain, right    Bilateral sensorineural hearing loss    Intertrigo    Dysfunction of both eustachian tubes    Chronic intractable pain    Strain of lumbar paraspinal muscle, initial encounter    Cervical stenosis of spinal canal    Cervical radiculopathy    History of colonic polyps    Hematoma of right lower leg    Nicotine use disorder         Past Surgical History:  Pertinent surgical history reviewed.  Past Surgical History:   Procedure Laterality Date    ABDOMEN SURGERY      ADRENALECTOMY Left     BRAIN SURGERY      COLONOSCOPY N/A 5/7/2024    Procedure: WITH COLONOSCOPY with polypectomies;  Surgeon: Walt Lira MD;  Location: [facility] OR    CRANIOTOMY FOR TEMPORAL LOBECTOMY Right     x3 for seizure    DILATION AND CURETTAGE      ESOPHAGOSCOPY, GASTROSCOPY, DUODENOSCOPY (EGD), COMBINED N/A 5/7/2024    Procedure: and biopsies;  Surgeon: Walt Lira MD;  Location: [facility] OR     ESOPHAGOSCOPY, GASTROSCOPY, DUODENOSCOPY (EGD), DILATATION, COMBINED N/A 5/7/2024    Procedure: ESOPHAGOGASTRODUODENOSCOPY with esophageal dilation;  Surgeon: Walt Lira MD;  Location: [facility] Main OR    HEAD & NECK SURGERY      HYSTERECTOMY, PAP NO LONGER INDICATED N/A     LITHOTRIPSY      PARATHYROID GLAND SURGERY      resection of adenoma    REFRACTIVE SURGERY Bilateral     RELEASE CARPAL TUNNEL Bilateral     TONSILLECTOMY & ADENOIDECTOMY      TOTAL THYROIDECTOMY      TOTAL VAGINAL HYSTERECTOMY      39 years old. Benign          Medications: Pertinent medications reviewed.  Current Outpatient Medications   Medication Sig Dispense Refill    albuterol (PROAIR HFA) 108 (90 Base) MCG/ACT inhaler Inhale 2 puffs into the lungs every 4 hours as needed for shortness of breath or wheezing 8 g 4    ARIPiprazole (ABILIFY) 15 MG tablet Take 1 tablet (15 mg) by mouth every morning 30 tablet 2    azelastine (ASTELIN) 0.1 % nasal spray       Brivaracetam (BRIVIACT) 100 MG tablet Take 150 mg by mouth 2 times daily       carBAMazepine (TEGRETOL) 200 MG tablet 200mg in the  at lunch and 400mg at HS      denosumab (PROLIA) 60 MG/ML SOSY injection Inject 60 mg Subcutaneous once Per pt report      DULoxetine (CYMBALTA) 60 MG capsule Take 2 capsules (120 mg) by mouth daily 180 capsule 3    EPINEPHrine (ANY BX GENERIC EQUIV) 0.3 MG/0.3ML injection 2-pack Inject 0.3 mLs (0.3 mg) into the muscle as needed for anaphylaxis 2 each 1    esomeprazole (NEXIUM) 40 MG DR capsule TAKE 1 CAPSULE BY MOUTH EVERY MORNING BEFORE BREAKFAST (TAKE 30-60 MINUTES BEFORE EATING) 28 capsule 2    fexofenadine (ALLEGRA) 180 MG tablet TAKE 1 TABLET BY MOUTH DAILY 90 tablet 3    fluticasone (FLONASE) 50 MCG/ACT nasal spray INHALE 1 SPRAY IN EACH NOSTRIL DAILY 16 g 2    GAVILYTE-G 236 g suspension  (Patient not taking: Reported on 7/18/2024)      GEMTESA 75 MG TABS tablet       levothyroxine (SYNTHROID/LEVOTHROID) 300 MCG tablet TAKE 1 TABLET BY  MOUTH DAILY 28 tablet 12    melatonin 3 MG tablet Take 1 tablet (3 mg) by mouth nightly as needed for sleep 90 tablet 4    multivitamin with iron (CHROMAGEN) TABS half-tab Take 1 tablet by mouth daily (Patient not taking: Reported on 7/18/2024)      Nutritional Supplements (NUTRITIONAL SHAKE HIGH PROTEIN) LIQD Take 1 each by mouth every morning Dispense Premier protein shakes. Replace breakfast with 1 shake daily. (Patient not taking: Reported on 7/18/2024) 9900 mL 11    nystatin (MYCOSTATIN) 798443 UNIT/GM external cream Apply topically 2 times daily as needed (skin infection/inflammation) (Apply to the most sensitive inflamed areas of skin) 90 g 2    polyethylene glycol (MIRALAX) 17 GM/Dose powder       polymixin b-trimethoprim (POLYTRIM) 16035-4.1 UNIT/ML-% ophthalmic solution Place 1-2 drops into the right eye every 4 hours 10 mL 0    prazosin (MINIPRESS) 2 MG capsule Take 1 capsule (2 mg) by mouth at bedtime 90 capsule 1    prednisoLONE acetate (PRED FORTE) 1 % ophthalmic suspension       pregabalin (LYRICA) 75 MG capsule Take 1 capsule (75 mg) by mouth 2 times daily 60 capsule 2    QUEtiapine (SEROQUEL) 25 MG tablet Take 1-2 tablets (25-50 mg) by mouth daily For agitation. 180 tablet 4    rosuvastatin (CRESTOR) 20 MG tablet TAKE 1 TABLET BY MOUTH DAILY 90 tablet 2    salmeterol (SEREVENT DISKUS) 50 MCG/ACT inhaler INHALE 1 PUFF INTO THE LUNGS TWICE A DAY 60 each 11    senna (SENNA-TIME) 8.6 MG tablet TAKE 2 TABLETS (17.2MG) BY MOUTH TWICE A  tablet 11    SUMAtriptan (IMITREX) 50 MG tablet Take 1 tablet (50 mg) by mouth at onset of headache for migraine May repeat in 2 hours. Max 4 tablets/24 hours. 12 tablet 3    umeclidinium (INCRUSE ELLIPTA) 62.5 MCG/ACT inhaler Inhale 1 puff into the lungs daily (Patient not taking: Reported on 7/18/2024) 30 each 1    varenicline (CHANTIX) 1 MG tablet TAKE 1 TABLET BY MOUTH TWICE A DAY 56 tablet 0    vitamin D3 (CHOLECALCIFEROL) 50 mcg (2000 units) tablet TAKE 2  TABLETS BY MOUTH DAILY 180 tablet 3    warfarin ANTICOAGULANT (COUMADIN) 5 MG tablet TAKE 2 TABLETS BY MOUTH ON MON AND FRI ; TAKE 2 & 1/2 TABLETS BY MOUTH ALL OTHER DAYS AS DIRECTED BY INR CLINIC 56 tablet 11    zolpidem (AMBIEN) 5 MG tablet Take tablet by mouth 15 minutes prior to sleep, for Sleep Study 1 tablet 0       [place] Prescription Monitoring Program reviewed 07/22/2024.  No concern for abuse or misuse of controlled medications based on this report.  07/15/024 Briviact 100mg 84 tabs for 28 days  07/15/2024 Pregabalin 75mg 56 tabs for 28 days.    Allergies: Pertinent allergies reviewed.     Allergies   Allergen Reactions    Aspirin Shortness Of Breath    Bees Anaphylaxis    Lamictal [Lamotrigine] Dizziness    Latex Itching    Phenobarbital      aggression    Vistaril [Hydroxyzine] Other (See Comments)     Sluggish, unable to wake up    Gabapentin Rash       Family History:   family history includes Alcoholism in her brother and sister; Chronic Obstructive Pulmonary Disease in her mother; Depression in her sister; Diabetes in her sister; Lung Cancer (age of onset: 76) in her maternal grandfather; No Known Problems in her sister; Other - See Comments in her brother and father; Skin Cancer in her father.    Social History:   Patient is  and lives in an apartment in [place].  She is independent in ADL's.  She has a cat.  She  reports that she has quit smoking. Her smoking use included cigarettes. She has never been exposed to tobacco smoke. She has never used smokeless tobacco. She reports that she does not drink alcohol and does not use drugs.  Social History     Social History Narrative    Not on file         Physical Exam:  /86   Pulse 73   LMP  (LMP Unknown)   SpO2 98%         Constitutional: She is oriented to person, place, and time.  She appears well-developed and well-nourished. She is not in acute distress. Morbidly obese.  HENT:     Head: Normocephalic and atraumatic.     Eyes: Pupils  are equal, round, and reactive to light. EOM are normal. No scleral icterus.   Pulmonary/Chest:  NWOB. No respiratory distress.   Neurological: She is alert and oriented to person, place, and time. Coordination grossly normal.   Skin: Skin is warm and dry. She is not diaphoretic.   Psychiatric: She has a normal mood and affect. Her behavior is normal. Judgment and thought content normal.  Patient answers questions appropriately.  MSK: presents in a wheel chair        Imaging:  EXAM: XR KNEE RIGHT 3 VIEWS  LOCATION: [facility]  DATE: 11/17/2023     INDICATION: Medial right knee pain.  COMPARISON: None.                                                                      IMPRESSION: No fracture. No degenerative changes. Small calcific density along the superomedial margin of the medial femoral condyle compatible with remote MCL injury.          DEXA was reviewed today.  IMPRESSION: Low bone density (OSTEOPENIA) with interval increase in BMD from the prior scan. T score meets the WHO criteria for low bone density (osteopenia) at one or more measured sites. The risk of osteoporotic fracture increases approximately two-fold for each standard deviation decrease in T-score.      EMG:  na      Diagnosis:  (M48.02) Cervical stenosis of spinal canal  (primary encounter diagnosis)  Comment:   Plan:     (M54.81) Bilateral occipital neuralgia  Comment:   Plan:     (M54.12) Cervical radiculopathy  Comment:   Plan:       (G89.29) Chronic intractable pain  Comment:   Plan:           Plan on 07/22/2024:  Reviewed R) knee xray 11/2023.       No medication changes today.  RN fills medication box for the week.    She [facility].    She wants to follow up at [facility] for her knee pain and do PT for knee pain R>L at [facility].    Follow-up in clinic 3 months.      Laure Olivares APRN, RN, CNP, FNP  [facility] [place]        BILLING TIME DOCUMENTATION:    The total TIME spent on this patient on the date of the encounter/appointment was 53 minutes.

## 2024-07-22 NOTE — PROGRESS NOTES
Patient presents to the clinic today for a visit  with SHAHRZAD Alejo CNP            4/9/2024     1:27 PM 5/20/2024     1:44 PM 7/22/2024     3:49 PM   PEG Score   PEG Total Score 9 3 4.33       UDS/CSA-na    Medications-na    QUESTIONS:    Ana Leon MA  [facility] [place]

## 2024-07-22 NOTE — PATIENT INSTRUCTIONS
Plan on 07/22/2024:  Reviewed R) knee xray 11/2023.       No medication changes today.  RN fills medication box for the week.    [facility]    She wants to follow up at [facility] for her knee pain and do PT for knee pain R>L at [facility].    Follow-up in clinic 3 months.      SHAHRZAD Hernandez, RN, CNP, FNP  [facility]/Cinthia Hurt          [facility]    Clinic Number:  [phone]  Call with any questions about your care and for scheduling assistance.   Calls are returned Monday through Friday between 8 AM and 4:30 PM. We usually get back to you within 2 business days depending on the issue/request.    If we are prescribing your medications:  For opioid medication refills, call the clinic or send a [employer] message 7 days in advance.  Please include:  Name of requested medication  Name of the pharmacy.  For non-opioid medications, call your pharmacy directly to request a refill. Please allow 3-4 days to be processed.   Per [place] State Law:  All controlled substance prescriptions must be filled within 30 days of being written.    For those controlled substances allowing refills, pickup must occur within 30 days of last fill.      We believe regular attendance is key to your success in our program!    Any time you are unable to keep your appointment we ask that you call us at least 24 hours in advance to cancel.This will allow us to offer the appointment time to another patient.   Multiple missed appointments may lead to dismissal from the clinic.

## 2024-07-23 ENCOUNTER — MEDICAL CORRESPONDENCE (OUTPATIENT)
Dept: HEALTH INFORMATION MANAGEMENT | Facility: CLINIC | Age: 64
End: 2024-07-23

## 2024-07-23 NOTE — TELEPHONE ENCOUNTER
"RN reviewed Dr. Puente's note -  RN please call pt.   Please tell her:   -She is at max dose of duloxetine (120mg).   -Dr. Bhatt's note says:   \"She will check with her pain clinic for additional/alternative options.\"     Thank you,   KH         RN phoned and spoke to the patient at phone  [phone] and instructed her to call her pain management clinic for additional or alternative pain options.     Briana Yoder RN on 7/23/2024 at 2:43 PM        "

## 2024-07-24 ENCOUNTER — HOSPITAL ENCOUNTER (OUTPATIENT)
Dept: CT IMAGING | Facility: CLINIC | Age: 64
Discharge: HOME | End: 2024-07-24
Attending: PHYSICIAN ASSISTANT | Admitting: PHYSICIAN ASSISTANT
Payer: COMMERCIAL

## 2024-07-24 DIAGNOSIS — D32.1 SPINAL MENINGIOMA (H): ICD-10-CM

## 2024-07-24 PROCEDURE — 72125 CT NECK SPINE W/O DYE: CPT

## 2024-07-25 ENCOUNTER — TELEPHONE (OUTPATIENT)
Dept: PHYSICAL MEDICINE AND REHAB | Facility: CLINIC | Age: 64
End: 2024-07-25
Payer: COMMERCIAL

## 2024-07-25 NOTE — TELEPHONE ENCOUNTER
----- Message from Lou Swanson sent at 7/25/2024  7:31 AM CDT -----  Please call this patient and let her know that I reviewed the CT of the cervical spine.  The area that we have been monitoring does not appear to have gotten larger.  It appears that the area we are monitoring is calcification of the ligament in the neck.  Patient to follow-up with me to review the results and discuss treatment options.

## 2024-07-25 NOTE — TELEPHONE ENCOUNTER
Phone call to patient to review results and provider's recommendations. Results given and explained. Discussed PSP wanting patient to return to clinic for further review of the images and her treatment options. Stated understanding. Transferred to scheduling to make appointment.

## 2024-07-30 ENCOUNTER — NURSE TRIAGE (OUTPATIENT)
Dept: FAMILY MEDICINE | Facility: CLINIC | Age: 64
End: 2024-07-30
Payer: COMMERCIAL

## 2024-07-30 ENCOUNTER — HOSPITAL ENCOUNTER (OUTPATIENT)
Dept: CARDIAC REHAB | Facility: HOSPITAL | Age: 64
Discharge: HOME | End: 2024-07-30
Attending: INTERNAL MEDICINE
Payer: COMMERCIAL

## 2024-07-30 PROCEDURE — G0239 OTH RESP PROC, GROUP: HCPCS

## 2024-07-30 NOTE — TELEPHONE ENCOUNTER
Covering provider.  I agree with plan as documented.  Would recommend office visit with PCP if she is having persistent elevations greater than 130/80 or immediately with any symptoms.

## 2024-07-30 NOTE — TELEPHONE ENCOUNTER
"S-(situation): Patient calling in to report BP readings of 180/\"something\" and repeat of 153/\"something\" at pulmonary rehab visit today. Denies any CP, SOB. HA, or blurry vision. Felt lightheaded while on bike at the rehab visit, but feels normal now.    B-(background): Hx of HTN, recently off losartan    A-(assessment): Potentially uncontrolled BP off medication with no sxs    R-(recommendations): Recommended patient takes her BP at home over the next few days outside of rehab visit and calls or messages her care team with numbers for review. Call back right away if she develops any sxs. Routing to PCP team for review if follow-up appt needed.    Norma Bhat RN        Reason for Disposition   Systolic BP >= 130 OR Diastolic >= 80, and is not taking BP medications    Additional Information   Negative: Sounds like a life-threatening emergency to the triager   Negative: Symptom is main concern (e.g., headache, chest pain)   Negative: Low blood pressure is main concern   Negative: Systolic BP >= 160 OR Diastolic >= 100, and any cardiac (e.g., breathing difficulty, chest pain) or neurologic symptoms (e.g., new-onset blurred or double vision)   Negative: Pregnant 20 or more weeks (or postpartum < 6 weeks) with new hand or face swelling   Negative: Pregnant 20 or more weeks (or postpartum < 6 weeks) and Systolic BP >= 160 OR Diastolic >= 110   Negative: Patient sounds very sick or weak to the triager   Negative: Systolic BP >= 200 OR Diastolic >= 120 and having NO cardiac or neurologic symptoms   Negative: Pregnant 20 or more weeks (or postpartum < 6 weeks) with Systolic BP >= 140 OR Diastolic >= 90   Negative: Systolic BP >= 180 OR Diastolic >= 110   Negative: Patient wants to be seen   Negative: Systolic BP >= 180 OR Diastolic >= 110, and missed most recent dose of blood pressure medication   Negative: Ran out of BP medications   Negative: Taking BP medications and feels is having side effects (e.g., impotence, cough, " "dizziness)   Negative: Systolic BP >= 160 OR Diastolic >= 100   Negative: Systolic BP >= 130 OR Diastolic >= 80, and pregnant    Answer Assessment - Initial Assessment Questions  1. BLOOD PRESSURE: \"What is the blood pressure?\" \"Did you take at least two measurements 5 minutes apart?\"      BP of 180/?? And 153/??  2. ONSET: \"When did you take your blood pressure?\"      At cardiac rehab appt today  3. HOW: \"How did you take your blood pressure?\" (e.g., automatic home BP monitor, visiting nurse)      Clinic visit  4. HISTORY: \"Do you have a history of high blood pressure?\"      Yes, but currently off losartan  5. MEDICINES: \"Are you taking any medicines for blood pressure?\" \"Have you missed any doses recently?\"      Off losartan  6. OTHER SYMPTOMS: \"Do you have any symptoms?\" (e.g., blurred vision, chest pain, difficulty breathing, headache, weakness)      Felt a little light-headed after getting off the bike at her rehab visit, but reports she feels better now at rest  7. PREGNANCY: \"Is there any chance you are pregnant?\" \"When was your last menstrual period?\"      N/A    Protocols used: Blood Pressure - High-A-[place]    "

## 2024-07-31 ENCOUNTER — ANTICOAGULATION THERAPY VISIT (OUTPATIENT)
Dept: ANTICOAGULATION | Facility: CLINIC | Age: 64
End: 2024-07-31
Payer: COMMERCIAL

## 2024-07-31 ENCOUNTER — TELEPHONE (OUTPATIENT)
Dept: FAMILY MEDICINE | Facility: CLINIC | Age: 64
End: 2024-07-31
Payer: COMMERCIAL

## 2024-07-31 DIAGNOSIS — Z86.711 HISTORY OF PULMONARY EMBOLISM: ICD-10-CM

## 2024-07-31 DIAGNOSIS — I26.99 PULMONARY EMBOLISM WITH INFARCTION (H): Primary | ICD-10-CM

## 2024-07-31 DIAGNOSIS — Z79.01 LONG TERM (CURRENT) USE OF ANTICOAGULANTS: ICD-10-CM

## 2024-07-31 LAB — INR (EXTERNAL): 2.2 (ref 0.9–1.1)

## 2024-07-31 NOTE — PROGRESS NOTES
ANTICOAGULATION MANAGEMENT     Valentina Olmedo 64 year old female is on warfarin with therapeutic INR result. (Goal INR 2.0-3.0)    Recent labs: (last 7 days)     07/31/24  0946   INR 2.2*       ASSESSMENT     Source(s): Chart Review and Home Care/Facility Nurse     Warfarin doses taken: Warfarin taken as instructed  Diet: No new diet changes identified  Medication/supplement changes:  one day left of antibiotic amoxicillin  New illness, injury, or hospitalization: Yes: tooth infection  Signs or symptoms of bleeding or clotting: No  Previous result: Therapeutic last visit; previously outside of goal range  Additional findings: None       PLAN     Recommended plan for no diet, medication or health factor changes affecting INR     Dosing Instructions: Continue your current warfarin dose with next INR in 2 weeks       Summary  As of 7/31/2024      Full warfarin instructions:  7.5 mg every Mon, Fri; 10 mg all other days   Next INR check:  8/14/2024               Telephone call with Roselyn BOO, [facility] home care nurse who verbalizes understanding and agrees to plan    Orders given to  Homecare nurse/facility to recheck    Education provided: None required    Plan made per [facility] anticoagulation protocol    Kindra Garay, RN  Anticoagulation Clinic  7/31/2024    _______________________________________________________________________     Anticoagulation Episode Summary       Current INR goal:  2.0-3.0   TTR:  47.9% (1 y)   Target end date:  Indefinite   Send INR reminders to:  [facility]    Indications    History of pulmonary embolism (Resolved) [Z86.711]  Pulmonary embolism with infarction (H) [I26.99]  Long term (current) use of anticoagulants [Z79.01]  History of pulmonary embolism [Z86.711]             Comments:               Anticoagulation Care Providers       Provider Role Specialty Phone number    Promise Vanegas MD Referring Family Medicine [phone]    Donald Rooney MD Referring Internal Medicine  [phone]    Jose Maria Morin MD [facility] Family Medicine [phone]

## 2024-08-01 ENCOUNTER — HOSPITAL ENCOUNTER (OUTPATIENT)
Dept: CARDIAC REHAB | Facility: HOSPITAL | Age: 64
Discharge: HOME | End: 2024-08-01
Attending: INTERNAL MEDICINE
Payer: COMMERCIAL

## 2024-08-01 ENCOUNTER — TELEPHONE (OUTPATIENT)
Dept: FAMILY MEDICINE | Facility: CLINIC | Age: 64
End: 2024-08-01
Payer: COMMERCIAL

## 2024-08-01 PROCEDURE — G0239 OTH RESP PROC, GROUP: HCPCS

## 2024-08-01 NOTE — TELEPHONE ENCOUNTER
Emerita RN from [facility] calling in to clarify 2 RX.      Prazosin and Esomeprazole-     RN reviewed medication SIGs. Emerita has no other questions at this time.

## 2024-08-02 ENCOUNTER — TELEPHONE (OUTPATIENT)
Dept: PULMONOLOGY | Facility: CLINIC | Age: 64
End: 2024-08-02
Payer: COMMERCIAL

## 2024-08-02 DIAGNOSIS — J44.9 CHRONIC OBSTRUCTIVE PULMONARY DISEASE, UNSPECIFIED COPD TYPE (H): ICD-10-CM

## 2024-08-02 DIAGNOSIS — F17.210 CIGARETTE NICOTINE DEPENDENCE: ICD-10-CM

## 2024-08-02 RX ORDER — UMECLIDINIUM 62.5 UG/1
1 AEROSOL, POWDER ORAL DAILY
OUTPATIENT
Start: 2024-08-02

## 2024-08-02 RX ORDER — VARENICLINE TARTRATE 1 MG/1
1 TABLET, FILM COATED ORAL 2 TIMES DAILY
Qty: 56 TABLET | Refills: 0 | Status: SHIPPED | OUTPATIENT
Start: 2024-08-02 | End: 2024-08-30

## 2024-08-02 NOTE — TELEPHONE ENCOUNTER
"Refill request received from pharmacy for Incruse Ellipta.  Per chart review, patient reported not taking medication on 7/18/24 and it was discontinued by Corinne Lebron PA-C on 7/31/24.  Patient scheduled to see Corinne Lebron PA-C in clinic on 8/8/24.    Called patient to inquire if still taking the Incruse Ellipta.  Patient states \"my pulmonary doctor said I don't have asthma or CPOD but I have breathing problems and he told me to take albuterol only, and nothing else.\"  States she hasn't taken either the Incruse Ellipta or Serevent \"in a couple of months, only the albuterol.\"  RN instructed pt to continue taking albuterol as she's been instructed by her pulmonologist and to contact pulmonology team regarding additional inhaler need for dyspnea, as per their last pulm note from 4/30/24 (please see that OV note for details).  Patient verbalized understanding and agreeable to contacting her pulmonologist today.      Patient additionally noted higher BP readings at Pulm Rehab this week (150/90) and the hematoma on her leg is \"getting bigger again, it was cleaned out by the surgeon early July.\"  Not currently on BP medication.  RN instructed pt to follow up on BP with Corinne Lebron PA-C during scheduled OV next week (8/8/24), and to contact the surgeon she saw at [facility], Dr. Ana Mancilla, regarding hematoma concerns as they said to follow up PRN if new concerns arose.  Patient verbalized understanding and will call providers as discussed.  No further questions/concerns at this time.      RN sent refusal of Incruse Ellipta request to pharmacy as it was discontinued 7/31/24, as noted above.        Paula Rollins RN  [facility]                     "

## 2024-08-02 NOTE — TELEPHONE ENCOUNTER
Spoke with Valentina in regards to her message below. She thinks she would like to try going back on her inhalers. She is having some increase in shortness of breath for the past few days. Maybe related to the humid weather. She denies cough, phlegm, fever or congestion. She is due for a 3 month follow up. Set her up with an appt to see Dr. Mallory on Monday.

## 2024-08-02 NOTE — TELEPHONE ENCOUNTER
[facility] Call Center    Phone Message    May a detailed message be left on voicemail: yes     Reason for Call: Symptoms or Concerns     If patient has red-flag symptoms, warm transfer to triage line    Current symptom or concern: Per caller  In April Dr. Mallory change her Inhaler medications to just take Albuterol per patient since then breathing has worsened have trouble now wants to discuss getting back on what she had before.     Symptoms have been present for:  1-2 week(s)    Has patient previously been seen for this? Yes      Action Taken: Other: PULM     Travel Screening: Not Applicable     Date of Service:

## 2024-08-05 ENCOUNTER — MEDICAL CORRESPONDENCE (OUTPATIENT)
Dept: HEALTH INFORMATION MANAGEMENT | Facility: CLINIC | Age: 64
End: 2024-08-05

## 2024-08-05 ENCOUNTER — OFFICE VISIT (OUTPATIENT)
Dept: PULMONOLOGY | Facility: CLINIC | Age: 64
End: 2024-08-05
Payer: COMMERCIAL

## 2024-08-05 VITALS — DIASTOLIC BLOOD PRESSURE: 66 MMHG | HEART RATE: 83 BPM | SYSTOLIC BLOOD PRESSURE: 130 MMHG | OXYGEN SATURATION: 96 %

## 2024-08-05 DIAGNOSIS — G47.33 OSA (OBSTRUCTIVE SLEEP APNEA): ICD-10-CM

## 2024-08-05 DIAGNOSIS — R29.898 SEVERE MUSCLE DECONDITIONING: ICD-10-CM

## 2024-08-05 DIAGNOSIS — J45.30 MILD PERSISTENT ASTHMA WITHOUT COMPLICATION: Primary | ICD-10-CM

## 2024-08-05 PROCEDURE — 99214 OFFICE O/P EST MOD 30 MIN: CPT | Performed by: INTERNAL MEDICINE

## 2024-08-05 PROCEDURE — G2211 COMPLEX E/M VISIT ADD ON: HCPCS | Performed by: INTERNAL MEDICINE

## 2024-08-05 RX ORDER — FLUTICASONE PROPIONATE AND SALMETEROL XINAFOATE 230; 21 UG/1; UG/1
2 AEROSOL, METERED RESPIRATORY (INHALATION) 2 TIMES DAILY
Qty: 12 G | Refills: 12 | Status: SHIPPED | OUTPATIENT
Start: 2024-08-05

## 2024-08-05 NOTE — PATIENT INSTRUCTIONS
Start ADVAIR 230/21 two puffs twice a day, rinse your mouth with water after each use  Continue albuterol HFA or duonebs every six hours as needed  CPAP therapy  Avoid eating close to bedtime  Raise the head of the bed  Eat in the upright position   Continue nexium daily and increase to twice a day if persistent acid reflux symptoms  Pulmonary rehabilitation   Follow up 4 months

## 2024-08-07 ENCOUNTER — MEDICAL CORRESPONDENCE (OUTPATIENT)
Dept: HEALTH INFORMATION MANAGEMENT | Facility: CLINIC | Age: 64
End: 2024-08-07

## 2024-08-07 DIAGNOSIS — J44.9 CHRONIC OBSTRUCTIVE PULMONARY DISEASE, UNSPECIFIED COPD TYPE (H): ICD-10-CM

## 2024-08-07 NOTE — TELEPHONE ENCOUNTER
"ANTICOAGULATION  MANAGEMENT    Assessment     Today's INR result of 1.50 is Subtherapeutic (goal INR of 2.0-3.0)        Warfarin taken differently than instructed, but no impact to total weekly dose    No new diet changes affecting INR    No new medication/supplements affecting INR    Continues to tolerate warfarin with no reported s/s of bleeding or thromboembolism     Previous INR was Subtherapeutic at 1.70 on 4/29/20.    Plan:     Spoke with Valentina regarding INR result and instructed:   1.  RN requested [facility] to [facility] dial \"1\" first, then [phone]     Warfarin Dosing Instructions:    - Change warfarin dose to 7.5 mg daily on Mon/Wed/Fri; and 5 mg daily rest of week.   - Valentina verbalized back 3x with correct warfarin dose instructions 3x    Instructed patient to follow up no later than:  2 wks.   - INR scheduled on 5/18/20 @ [facility].    Education provided: target INR goal and significance of current INR result and importance of taking warfarin as instructed    Valentina verbalizes understanding and agrees to warfarin dosing plan.    Instructed to call the [facility] for any changes, questions or concerns. (#[phone])   ?   Shonna Ernandez RN    Subjective/Objective:      Valentina Olmedo, a 60 y.o. female is on warfarin.     Valentina reports:     Home warfarin dose: template incorrect; verbally confirmed home dose with Valentina and updated on anticoagulation calendar     Missed doses: No     Medication changes:  No     S/S of bleeding or thromboembolism:  No     New Injury or illness:  No     Changes in diet or alcohol consumption:  No     Upcoming surgery, procedure or cardioversion:  No    Anticoagulation Episode Summary     Current INR goal:   2.0-3.0   TTR:   46.3 % (1 y)   Next INR check:   5/20/2020   INR from last check:   1.50! (5/6/2020)   Weekly max warfarin dose:      Target end date:      INR check location:      Preferred lab:      Send INR reminders to:   [facility]" Indications    Pulmonary embolism with infarction (H) [I26.99]           Comments:            Anticoagulation Care Providers     Provider Role Specialty Phone number    Donald Rooney MD Referring Internal Medicine [phone]         no

## 2024-08-08 ENCOUNTER — OFFICE VISIT (OUTPATIENT)
Dept: FAMILY MEDICINE | Facility: CLINIC | Age: 64
End: 2024-08-08
Payer: COMMERCIAL

## 2024-08-08 VITALS
SYSTOLIC BLOOD PRESSURE: 134 MMHG | RESPIRATION RATE: 20 BRPM | DIASTOLIC BLOOD PRESSURE: 83 MMHG | HEIGHT: 67 IN | WEIGHT: 293 LBS | TEMPERATURE: 98 F | BODY MASS INDEX: 45.99 KG/M2 | OXYGEN SATURATION: 98 % | HEART RATE: 59 BPM

## 2024-08-08 DIAGNOSIS — I26.99 PULMONARY EMBOLISM WITH INFARCTION (H): ICD-10-CM

## 2024-08-08 DIAGNOSIS — J44.9 CHRONIC OBSTRUCTIVE PULMONARY DISEASE, UNSPECIFIED COPD TYPE (H): ICD-10-CM

## 2024-08-08 DIAGNOSIS — G47.33 OSA (OBSTRUCTIVE SLEEP APNEA): ICD-10-CM

## 2024-08-08 DIAGNOSIS — E66.813 CLASS 3 SEVERE OBESITY DUE TO EXCESS CALORIES WITH SERIOUS COMORBIDITY AND BODY MASS INDEX (BMI) OF 50.0 TO 59.9 IN ADULT (H): ICD-10-CM

## 2024-08-08 DIAGNOSIS — F33.1 MAJOR DEPRESSIVE DISORDER, RECURRENT EPISODE, MODERATE (H): ICD-10-CM

## 2024-08-08 DIAGNOSIS — I10 ESSENTIAL HYPERTENSION: Primary | ICD-10-CM

## 2024-08-08 DIAGNOSIS — Z86.0100 HISTORY OF COLONIC POLYPS: ICD-10-CM

## 2024-08-08 DIAGNOSIS — F17.200 NICOTINE USE DISORDER: ICD-10-CM

## 2024-08-08 DIAGNOSIS — Z12.11 SCREEN FOR COLON CANCER: ICD-10-CM

## 2024-08-08 DIAGNOSIS — E66.01 CLASS 3 SEVERE OBESITY DUE TO EXCESS CALORIES WITH SERIOUS COMORBIDITY AND BODY MASS INDEX (BMI) OF 50.0 TO 59.9 IN ADULT (H): ICD-10-CM

## 2024-08-08 PROCEDURE — 99214 OFFICE O/P EST MOD 30 MIN: CPT | Performed by: PHYSICIAN ASSISTANT

## 2024-08-08 PROCEDURE — 96127 BRIEF EMOTIONAL/BEHAV ASSMT: CPT | Performed by: PHYSICIAN ASSISTANT

## 2024-08-08 PROCEDURE — G2211 COMPLEX E/M VISIT ADD ON: HCPCS | Performed by: PHYSICIAN ASSISTANT

## 2024-08-08 RX ORDER — ESOMEPRAZOLE MAGNESIUM 40 MG/1
CAPSULE, DELAYED RELEASE ORAL
Qty: 28 CAPSULE | Refills: 2 | OUTPATIENT
Start: 2024-08-08

## 2024-08-08 RX ORDER — UMECLIDINIUM 62.5 UG/1
1 AEROSOL, POWDER ORAL DAILY
OUTPATIENT
Start: 2024-08-08

## 2024-08-08 RX ORDER — VARENICLINE TARTRATE 1 MG/1
1 TABLET, FILM COATED ORAL 2 TIMES DAILY
Qty: 56 TABLET | Refills: 0 | OUTPATIENT
Start: 2024-08-08

## 2024-08-08 ASSESSMENT — ANXIETY QUESTIONNAIRES
6. BECOMING EASILY ANNOYED OR IRRITABLE: MORE THAN HALF THE DAYS
IF YOU CHECKED OFF ANY PROBLEMS ON THIS QUESTIONNAIRE, HOW DIFFICULT HAVE THESE PROBLEMS MADE IT FOR YOU TO DO YOUR WORK, TAKE CARE OF THINGS AT HOME, OR GET ALONG WITH OTHER PEOPLE: EXTREMELY DIFFICULT
5. BEING SO RESTLESS THAT IT IS HARD TO SIT STILL: SEVERAL DAYS
4. TROUBLE RELAXING: MORE THAN HALF THE DAYS
2. NOT BEING ABLE TO STOP OR CONTROL WORRYING: MORE THAN HALF THE DAYS
1. FEELING NERVOUS, ANXIOUS, OR ON EDGE: SEVERAL DAYS
7. FEELING AFRAID AS IF SOMETHING AWFUL MIGHT HAPPEN: MORE THAN HALF THE DAYS
GAD7 TOTAL SCORE: 12
7. FEELING AFRAID AS IF SOMETHING AWFUL MIGHT HAPPEN: MORE THAN HALF THE DAYS
3. WORRYING TOO MUCH ABOUT DIFFERENT THINGS: MORE THAN HALF THE DAYS
GAD7 TOTAL SCORE: 12
8. IF YOU CHECKED OFF ANY PROBLEMS, HOW DIFFICULT HAVE THESE MADE IT FOR YOU TO DO YOUR WORK, TAKE CARE OF THINGS AT HOME, OR GET ALONG WITH OTHER PEOPLE?: EXTREMELY DIFFICULT
GAD7 TOTAL SCORE: 12

## 2024-08-08 ASSESSMENT — PATIENT HEALTH QUESTIONNAIRE - PHQ9
SUM OF ALL RESPONSES TO PHQ QUESTIONS 1-9: 15
SUM OF ALL RESPONSES TO PHQ QUESTIONS 1-9: 15
10. IF YOU CHECKED OFF ANY PROBLEMS, HOW DIFFICULT HAVE THESE PROBLEMS MADE IT FOR YOU TO DO YOUR WORK, TAKE CARE OF THINGS AT HOME, OR GET ALONG WITH OTHER PEOPLE: NOT DIFFICULT AT ALL

## 2024-08-08 NOTE — ASSESSMENT & PLAN NOTE
She states she continues to gain weight.  BMI 57.  She is interested bariatric surgery or medication.  We will have her see Dr. Starr to discuss weight loss medication.

## 2024-08-08 NOTE — ASSESSMENT & PLAN NOTE
She has a CPAP.  Approximately 3 months ago her CPAP stopped working.  She has an upcoming appointment at the sleep clinic 9/16/24 to get a new machine.

## 2024-08-08 NOTE — PROGRESS NOTES
The longitudinal plan of care for the diagnosis(es)/condition(s) as documented were addressed during this visit. Due to the added complexity in care, I will continue to support Valentina in the subsequent management and with ongoing continuity of care.  Assessment & Plan   Problem List Items Addressed This Visit       Essential hypertension - Primary     Reports b/p at home earlier today was 127/82.  Initial b/p in clinic today was mildly elevated.  Recheck on b/p was 134/83.  Losartan was placed on hold in June due to hypotension.  No antihypertensives indicated at this time.  She will continue to montior b/p's at home.           CHRIS (obstructive sleep apnea)     She has a CPAP.  Approximately 3 months ago her CPAP stopped working.  She has an upcoming appointment at the sleep clinic 9/16/24 to get a new machine.           Class 3 severe obesity due to excess calories with serious comorbidity and body mass index (BMI) of 50.0 to 59.9 in adult (H)     She states she continues to gain weight.  BMI 57.  She is interested bariatric surgery or medication.  We will have her see Dr. Starr to discuss weight loss medication.           Chronic obstructive pulmonary disease (H)     States she is now on advair and albuterol as a rescue inhaler.  Symptoms are stable.  She continues to have a dry cough.           Major depressive disorder, recurrent episode, moderate (H)         5/1/2024     2:00 PM 6/24/2024    12:58 PM 8/8/2024     2:07 PM   PHQ   PHQ-9 Total Score 18 13 15   Q9: Thoughts of better off dead/self-harm past 2 weeks Nearly every day Several days More than half the days   F/U: Thoughts of suicide or self-harm  No No   F/U: Safety concerns  Yes No    Continues to follow with Mental Health for counseling every 2-3 months.  Currently on Abilify and duloxetine and ambien for sleep.          Pulmonary embolism with infarction (H)     She is on long-term warfarin following pulmonary embolism.         History of colonic  polyps     Previous colonoscopy completed 5/2024:  Impression:            - One 6 mm polyp in the cecum, removed with a cold                          snare. Resected and retrieved.                          - Three 5 to 12 mm polyps in the ascending colon,                          removed with a cold snare. Resected and retrieved.                          - Two 8 to 10 mm polyps in the transverse colon,                          removed with a cold snare. Resected and retrieved.                          - Four 8 to 13 mm polyps in the descending colon,                          removed with a cold snare. Resected and retrieved.                          - Four 5 to 9 mm polyps in the sigmoid colon, removed                          with a cold snare. Resected and retrieved.                          - The examination was otherwise normal on direct and                          retroflexion views.   Recommendation:        - Discharge patient to home.                          - Resume previous diet.                          - Restart anticoagulation tomorrow.                          - Repeat colonoscopy with double colon preparation in                          3 months. One week of low fiber diet prior to to                          colonoscopy, daily miralax for 7 days and double colon                          preparation.                          - Recommend referral for genetics to rule out                          underlying hereditary polyposis syndrome.                          - First degree relatives should start screening for                          colorectal cancer at the age of 40 then every 5 years.                                                                             Colonoscopy ordered today.  Referral to  to evaluate for hereditary polyposis syndrome placed today.           Nicotine use disorder     She smokes 2 cigarettes daily. She will continue to work on smoking cessation.  Some days she  "is able to go without smoking.          Other Visit Diagnoses       Screen for colon cancer        Relevant Orders    Colonoscopy Screening  Referral    Adult Genetics & Metabolism  Referral                  Nicotine/Tobacco Cessation  She reports that she has been smoking cigarettes. She has never been exposed to tobacco smoke. She has never used smokeless tobacco.  Nicotine/Tobacco Cessation Plan  Information offered: Patient not interested at this time      BMI  Estimated body mass index is 57.79 kg/m  as calculated from the following:    Height as of this encounter: 1.702 m (5' 7\").    Weight as of this encounter: 167.4 kg (369 lb).     Depression Screening Follow Up        8/8/2024     2:07 PM   PHQ   PHQ-9 Total Score 15   Q9: Thoughts of better off dead/self-harm past 2 weeks More than half the days   F/U: Thoughts of suicide or self-harm No   F/U: Safety concerns No     Follow Up Actions Taken  Follows with Mental Health.      Discussed the following ways the patient can remain in a safe environment:  be around others    Subjective   Valentina is a 64 year old, presenting for the following health issues:  Blood Pressure Check        8/8/2024     2:06 PM   Additional Questions   Roomed by as   Accompanied by self         8/8/2024     2:06 PM   Patient Reported Additional Medications   Patient reports taking the following new medications no     History of Present Illness       Hypertension: She presents for follow up of hypertension.  She does check blood pressure  regularly outside of the clinic. Outside blood pressures have been over 140/90. She does not follow a low salt diet. She consumes 2 sweetened beverage(s) daily.She exercises with enough effort to increase her heart rate 9 or less minutes per day.  She exercises with enough effort to increase her heart rate 4 days per week.   She is taking medications regularly.           Objective    /83 (BP Location: Left arm, Patient Position: " "Left side, Cuff Size: Adult Large)   Pulse 59   Temp 98  F (36.7  C) (Oral)   Resp 20   Ht 1.702 m (5' 7\")   Wt (!) 167.4 kg (369 lb)   LMP  (LMP Unknown)   SpO2 98%   BMI 57.79 kg/m    Body mass index is 57.79 kg/m .  Physical Exam  Vitals and nursing note reviewed.   Constitutional:       Appearance: Normal appearance.   Cardiovascular:      Rate and Rhythm: Normal rate and regular rhythm.      Heart sounds: Normal heart sounds.   Pulmonary:      Effort: Pulmonary effort is normal.      Breath sounds: Normal breath sounds.   Neurological:      Mental Status: She is alert.                Signed Electronically by: Corinne Lebron PA-C    "

## 2024-08-08 NOTE — PATIENT INSTRUCTIONS
- Repeat colonoscopy with double colon preparation in                          3 months. One week of low fiber diet prior to to                          colonoscopy, daily miralax for 7 days and double colon                          preparation.

## 2024-08-08 NOTE — ASSESSMENT & PLAN NOTE
States she is now on advair and albuterol as a rescue inhaler.  Symptoms are stable.  She continues to have a dry cough.

## 2024-08-08 NOTE — ASSESSMENT & PLAN NOTE
She smokes 2 cigarettes daily. She will continue to work on smoking cessation.  Some days she is able to go without smoking.

## 2024-08-08 NOTE — ASSESSMENT & PLAN NOTE
Previous colonoscopy completed 5/2024:  Impression:            - One 6 mm polyp in the cecum, removed with a cold                          snare. Resected and retrieved.                          - Three 5 to 12 mm polyps in the ascending colon,                          removed with a cold snare. Resected and retrieved.                          - Two 8 to 10 mm polyps in the transverse colon,                          removed with a cold snare. Resected and retrieved.                          - Four 8 to 13 mm polyps in the descending colon,                          removed with a cold snare. Resected and retrieved.                          - Four 5 to 9 mm polyps in the sigmoid colon, removed                          with a cold snare. Resected and retrieved.                          - The examination was otherwise normal on direct and                          retroflexion views.   Recommendation:        - Discharge patient to home.                          - Resume previous diet.                          - Restart anticoagulation tomorrow.                          - Repeat colonoscopy with double colon preparation in                          3 months. One week of low fiber diet prior to to                          colonoscopy, daily miralax for 7 days and double colon                          preparation.                          - Recommend referral for genetics to rule out                          underlying hereditary polyposis syndrome.                          - First degree relatives should start screening for                          colorectal cancer at the age of 40 then every 5 years.                                                                             Colonoscopy ordered today.  Referral to  to evaluate for hereditary polyposis syndrome placed today.

## 2024-08-08 NOTE — ASSESSMENT & PLAN NOTE
Reports b/p at home earlier today was 127/82.  Initial b/p in clinic today was mildly elevated.  Recheck on b/p was 134/83.  Losartan was placed on hold in June due to hypotension.  No antihypertensives indicated at this time.  She will continue to montior b/p's at home.

## 2024-08-08 NOTE — ASSESSMENT & PLAN NOTE
5/1/2024     2:00 PM 6/24/2024    12:58 PM 8/8/2024     2:07 PM   PHQ   PHQ-9 Total Score 18 13 15   Q9: Thoughts of better off dead/self-harm past 2 weeks Nearly every day Several days More than half the days   F/U: Thoughts of suicide or self-harm  No No   F/U: Safety concerns  Yes No    Continues to follow with Mental Health for counseling every 2-3 months.  Currently on Abilify and duloxetine and ambien for sleep.

## 2024-08-11 NOTE — PROGRESS NOTES
PULMONARY OUTPATIENT FOLLOW UP NOTE      Assessment:      Mild persistent asthma   PFT showed normal spirometry, lung volumes and diffusion capacity.   Resume ICS/LABA. Albuterol HFA as needed  Allergic rhinitis   Nasal steroid, H1 blocker as needed  GERD  Avoid eating close to bedtime. Raise the head of the bed  Continue nexium daily and increase to twice a day if persistent acid reflux symptoms.   Sleep apnea   PSG ( 2/3/2021) AHI 91, SpO2 sheri 78.6%, 9.2 minutes <= 88%.   Patient states that AutoCPAP machine is broken and she would like a new one.   AutoCPAP 5-20 cmH20 was previously prescribed.   Patient is scheduled to see in the sleep clinic on 9/16/2024.   History of pulmonary embolism   On long term anticoagulation   Physical deconditioning   Pulmonary rehabilitation   Previous tobacco use  Significant tobacco use in the past, 1/2 ppd for 45 years, quit February 2023.   Morbid obesity , BMI 57      Plan:      Start ADVAIR 230/21 two puffs twice a day, rinse your mouth with water after each use  Continue albuterol HFA or duonebs every six hours as needed  Follow up in the sleep clinic  CPAP therapy  Avoid eating close to bedtime  Raise the head of the bed  Eat in the upright position   Continue nexium daily and increase to twice a day if persistent acid reflux symptoms  Pulmonary rehabilitation   Follow up 4 months     Sen Balderas MD  Pulmonary Medicine  08/5/24         CC:     Chief Complaint   Patient presents with    Follow Up     discuss inhalers      HPI:      Valentina Olmedo is a 64 year old female who presents for follow up  Patient has history of HTN, hypothyroidism, asthma and allergic rhinitis, GERD, schizoaffective disorder, anxiety disorder, PTSD, history of pulmonary embolism anticoagulated, CHRIS, obesity BMI 56, previous tobacco user.   Hospitalized on June 16,2024 for evaluation of right leg pain after mechanical fall diagnosed with traumatic RLE hematoma, underwent I&D with  removal of clot.     Limited mobility, uses walker in short distance.  Patient reports doing well. Activity level is very limited.   Ongoing cough and wheezes.   Patient asked for new referral to pulmonary rehab.  Patient quit tobacco use on February 2023.   Uses Albuterol HFA as needed.   Denies chest pain, orthopnea or PND, no swelling of LEs.   Denies fever, chills or night sweats.   Mild postnasal drip, denies headaches, or sinus tenderness. Uses nasal steroid, H1 blocker.   Acid reflux symptoms daily , takes nexium   Patient is on long term anticoagulation for history of pulmonary embolism.  Patient will be seen in the sleep clinic on 9/16/2024    Past Medical History :     Past Medical History:   Diagnosis Date    Adrenal mass (H24) 10/12/2015    Chen Null MD  They were incidentally discovered on the CAT scan of the abdomen from December 2011 which was done for evaluation of kidney stones. F/up CT of the abdomen done on 6/26/12 showed a stable 5 mm nodular opacity in the right lower lung lobe, adjacent to the diagram. Bilateral adrenal masses average density measured less than 0 Hounsfield units, consistent with benign etio    Age-related cataract of both eyes, unspecified age-related cataract type 03/27/2024    Anxiety     Anxiety     Arthritis     Borderline personality disorder (H)     Cancer (H)     COPD (chronic obstructive pulmonary disease) (H)     Depression     Depressive disorder     History of COVID-19     Hyperlipidemia     Hypertension     Hypertension     Hyponatremia     Hypothyroidism     Malignant neoplasm of thyroid gland (H)     Chen Null MD  She had R hemithyroidectomy for a right neck tumor in 1994. According to the patient, the tumor was benign. She is not sure whether or not the tumor belonged to the thyroid gland. The surgery was done here at [facility]. In January 2011, she was diagnosed with kidney stones. She was found to have an elevated calcium  level of 11.7, with a PTH level of 368. Stone an    Primary hyperparathyroidism (H24)           PTSD (post-traumatic stress disorder)     Pulmonary embolism with infarction (H) 01/12/2021    Recurrent otitis media     Seizure disorder (H)     Sleep apnea     cpap at [facility]    Stroke (H)     Tobacco abuse     Vertigo         Medications:     Current Outpatient Medications   Medication Sig Dispense Refill    albuterol (PROAIR HFA) 108 (90 Base) MCG/ACT inhaler Inhale 2 puffs into the lungs every 4 hours as needed for shortness of breath or wheezing 8 g 4    ARIPiprazole (ABILIFY) 15 MG tablet Take 1 tablet (15 mg) by mouth every morning 30 tablet 2    azelastine (ASTELIN) 0.1 % nasal spray       Brivaracetam (BRIVIACT) 100 MG tablet Take 150 mg by mouth 2 times daily       carBAMazepine (TEGRETOL) 200 MG tablet 200mg in the  at lunch and 400mg at HS      denosumab (PROLIA) 60 MG/ML SOSY injection Inject 60 mg Subcutaneous once Per pt report      DULoxetine (CYMBALTA) 60 MG capsule Take 2 capsules (120 mg) by mouth daily 180 capsule 3    EPINEPHrine (ANY BX GENERIC EQUIV) 0.3 MG/0.3ML injection 2-pack Inject 0.3 mLs (0.3 mg) into the muscle as needed for anaphylaxis 2 each 1    esomeprazole (NEXIUM) 40 MG DR capsule TAKE 1 CAPSULE BY MOUTH EVERY MORNING BEFORE BREAKFAST (TAKE 30-60 MINUTES BEFORE EATING) 28 capsule 2    fexofenadine (ALLEGRA) 180 MG tablet TAKE 1 TABLET BY MOUTH DAILY 90 tablet 3    fluticasone (FLONASE) 50 MCG/ACT nasal spray INHALE 1 SPRAY IN EACH NOSTRIL DAILY 16 g 2    fluticasone-salmeterol (ADVAIR-HFA) 230-21 MCG/ACT inhaler Inhale 2 puffs into the lungs 2 times daily 12 g 12    GEMTESA 75 MG TABS tablet       levothyroxine (SYNTHROID/LEVOTHROID) 300 MCG tablet TAKE 1 TABLET BY MOUTH DAILY 28 tablet 12    melatonin 3 MG tablet Take 1 tablet (3 mg) by mouth nightly as needed for sleep 90 tablet 4    multivitamin with iron (CHROMAGEN) TABS half-tab Take 1 tablet by mouth daily      nystatin  (MYCOSTATIN) 692076 UNIT/GM external cream Apply topically 2 times daily as needed (skin infection/inflammation) (Apply to the most sensitive inflamed areas of skin) 90 g 2    polyethylene glycol (MIRALAX) 17 GM/Dose powder       polymixin b-trimethoprim (POLYTRIM) 05059-4.1 UNIT/ML-% ophthalmic solution Place 1-2 drops into the right eye every 4 hours 10 mL 0    prazosin (MINIPRESS) 2 MG capsule Take 1 capsule (2 mg) by mouth at bedtime 90 capsule 1    prednisoLONE acetate (PRED FORTE) 1 % ophthalmic suspension       pregabalin (LYRICA) 75 MG capsule Take 1 capsule (75 mg) by mouth 2 times daily 60 capsule 2    QUEtiapine (SEROQUEL) 25 MG tablet Take 1-2 tablets (25-50 mg) by mouth daily For agitation. 180 tablet 4    rosuvastatin (CRESTOR) 20 MG tablet TAKE 1 TABLET BY MOUTH DAILY 90 tablet 2    salmeterol (SEREVENT DISKUS) 50 MCG/ACT inhaler INHALE 1 PUFF INTO THE LUNGS TWICE A DAY 60 each 11    senna (SENNA-TIME) 8.6 MG tablet TAKE 2 TABLETS (17.2MG) BY MOUTH TWICE A  tablet 11    SUMAtriptan (IMITREX) 50 MG tablet Take 1 tablet (50 mg) by mouth at onset of headache for migraine May repeat in 2 hours. Max 4 tablets/24 hours. 12 tablet 3    varenicline (CHANTIX) 1 MG tablet TAKE 1 TABLET BY MOUTH TWICE A DAY 56 tablet 0    warfarin ANTICOAGULANT (COUMADIN) 5 MG tablet TAKE 2 TABLETS BY MOUTH ON MON AND FRI ; TAKE 2 & 1/2 TABLETS BY MOUTH ALL OTHER DAYS AS DIRECTED BY INR CLINIC 56 tablet 11    zolpidem (AMBIEN) 5 MG tablet Take tablet by mouth 15 minutes prior to sleep, for Sleep Study 1 tablet 0    GAVILYTE-G 236 g suspension        Current Facility-Administered Medications   Medication Dose Route Frequency Provider Last Rate Last Admin    denosumab (PROLIA) injection 60 mg  60 mg Subcutaneous Q6 Months Alice Rolle MD   60 mg at 11/14/23 1445    denosumab (PROLIA) injection 60 mg  60 mg Subcutaneous Q6 Months Alice Rolle MD   60 mg at 05/14/24 1516       Social History :     Social History      Socioeconomic History    Marital status:      Spouse name: Not on file    Number of children: Not on file    Years of education: Not on file    Highest education level: Not on file   Occupational History    Not on file   Tobacco Use    Smoking status: Some Days     Types: Cigarettes     Passive exposure: Never    Smokeless tobacco: Never    Tobacco comments:     Quit about 5 months ago on Chantix.     Vaping Use    Vaping status: Never Used   Substance and Sexual Activity    Alcohol use: No     Comment: None.    Drug use: No    Sexual activity: Not Currently     Partners: Male   Other Topics Concern    Not on file   Social History Narrative    Not on file     Social Determinants of Health     Financial Resource Strain: High Risk (1/3/2024)    Financial Resource Strain     Within the past 12 months, have you or your family members you live with been unable to get utilities (heat, electricity) when it was really needed?: Yes   Food Insecurity: Food Insecurity Present (6/15/2024)    Received from [facility]    Hunger Vital Sign     Worried About Running Out of Food in the Last Year: Sometimes true     Ran Out of Food in the Last Year: Sometimes true   Transportation Needs: No Transportation Needs (6/15/2024)    Received from [facility]    PRAPARE - Transportation     Lack of Transportation (Medical): No     Lack of Transportation (Non-Medical): No   Physical Activity: Not on file   Stress: Not on file   Social Connections: Not on file   Interpersonal Safety: Unknown (6/15/2024)    Received from [facility]    Humiliation, Afraid, Rape, and Kick questionnaire     Fear of Current or Ex-Partner: Not on file     Emotionally Abused: Not on file     Physically Abused: No     Sexually Abused: No   Housing Stability: High Risk (6/15/2024)    Received from [facility]    Housing Stability Vital Sign     Unable to Pay for Housing in the Last Year: Yes     Number of Places Lived in the Last Year: Not on  file     In the last 12 months, was there a time when you did not have a steady place to sleep or slept in a shelter (including now)?: No          Family History :     Family History   Problem Relation Age of Onset    Chronic Obstructive Pulmonary Disease Mother     Other - See Comments Father         estranged    Skin Cancer Father     Lung Cancer Maternal Grandfather 76    Other - See Comments Brother         Missing since 1992    Alcoholism Brother     Diabetes Sister     Depression Sister     Alcoholism Sister     No Known Problems Sister         Half sister       Review of Systems  A 12 point comprehensive review of systems was negative except as noted.        Objective:     /66 (BP Location: Left arm, Patient Position: Sitting, Cuff Size: Adult Large)   Pulse 83   LMP  (LMP Unknown)   SpO2 96%     Physical Exam  Constitutional:       General: She is not in acute distress.     Appearance: She is not ill-appearing or diaphoretic.      Comments: In wheelchair   HENT:      Nose: Nose normal.   Cardiovascular:      Rate and Rhythm: Normal rate and regular rhythm.      Pulses: Normal pulses.      Heart sounds: Normal heart sounds.   Pulmonary:      Effort: Pulmonary effort is normal. No respiratory distress.      Breath sounds: Normal breath sounds. No wheezing or rhonchi.   Skin:     General: Skin is warm and dry.      Findings: No rash.   Neurological:      Mental Status: She is alert.   Psychiatric:         Behavior: Behavior normal.            Diagnostic tests:      Echocardiogram 4/2017  1. Normal left ventricular size and systolic performance with a visually estimated ejection fraction of 60-65%.   2. No significant valvular heart disease is identified on this study.   3. Probable normal right ventricular size and systolic performance though right-sided structures are not clearly visualized on all views on this study.   4. There is mild left atrial enlargement.   5. Right ventricular systolic pressure  cannot be directly estimated from TR velocities due to insufficient tricuspid regurgitation. However, there are no indirect findings such as abnormal RV volume/geometry, altered pulmonary flow velocity profile, or leftward septal displacement to suggest moderate or severe pulmonary hypertension.      When compared to the prior real-time echocardiogram dated 29 July 2014, there has been little appreciable interval change.     Nuc Med Stress Test 06/2021:  SUMMARY:  1.  Subjectively positive regadenoson infusion.  2.  Negative regadenoson ECG for ischemia.  3.  Regadenoson nuclear imaging does not suggest any ischemia or prior scar.  4.  Preserved wall motion as mentioned above.  5.  No prior scan is available for comparison.    PFTs:     FVC  2.78 L (82%)  FEV1 2.25 L (85%)  FEV1/FVC 81  TLC 4.87 L (92%)  RV 1.71 L (84%)  DLCO 94%         Sleep study:      Sleep study done on 3/26/2015 showed decrease sleep time, AHI 1.8, per sleep physician, underestimated secondary to no stage REM sleep, significant hypoxia was note present.     PSG ( 2/3/2021) AHI 91, SpO2 sheri 78.6%, 9.2 minutes <= 88%.   AutoCPAP.     IMAGES:     CT CHEST PULMONARY EMBOLISM W CONTRAST  LOCATION: [facility]  DATE/TIME: 8/31/2021 2:17 PM   INDICATION: Dyspnea, chronic, unclear etiology.  COMPARISON: None.  FINDINGS:  ANGIOGRAM CHEST: Pulmonary arteries are normal caliber and negative for pulmonary emboli. Thoracic aorta is negative for dissection or aneurysm.  LUNGS AND PLEURA: Minimal atelectasis or scarring. Otherwise, lungs are clear. No significant airway thickening or bronchiectasis. No pleural effusion or pneumothorax.  MEDIASTINUM/AXILLAE: No adenopathy. No pericardial effusion. Small hiatus hernia.  CORONARY ARTERY CALCIFICATION: None.  UPPER ABDOMEN: Hepatic steatosis. Stable 2.5 cm left adrenal adenoma requiring no routine follow-up.  MUSCULOSKELETAL: Normal.  IMPRESSION:  1.  No findings to explain  symptoms.  2.  No pulmonary embolism.  3.  Hepatic steatosis.  4.  Small hiatus hernia.     XR CHEST 2 VW  LOCATION: [facility]  DATE/TIME: 12/9/2021 10:34 AM  INDICATION: Chronic cough and left chest wall pain. History of COPD.  COMPARISON: CT chest 8/31/2021 and one view chest 9/9/2019  IMPRESSION: Heart size and vascularity are normal. Focal subpleural airspace opacities within the left mid lung and lower lobe suggesting pneumonia. No pneumothorax nor pleural effusion.  Follow-up radiograph should be suggested to ensure resolution.    CT ABDOMEN PELVIS W CONTRAST  DATE/TIME: 5/7/2022 1:16 PM  INDICATION:  Pelvic and perineal pain.  COMPARISON: CT abdomen 05/23/2013.  FINDINGS:   LOWER CHEST: Normal.  HEPATOBILIARY: No acute abnormality. A hypodense nodule suggesting hemangioma is stable in size measuring 1.8 cm posterior right hepatic dome series 3 image 27. Gallbladder appears unremarkable.  PANCREAS: Normal.  SPLEEN: No acute abnormality. Adjacent splenule is again noted.  ADRENAL GLANDS: Hypodense nodule is 2.8 cm right adrenal, previously 1.8 cm series 3 image 55. Normal left adrenal.  KIDNEYS/BLADDER: No hydronephrosis or urolithiasis. No acute abnormality. Bladder is unremarkable.  BOWEL: No obstruction. Normal appendix. No acute inflammatory change.  LYMPH NODES: Normal.  VASCULATURE: Unremarkable.  PELVIC ORGANS: Hysterectomy. No perineal abscess. No convincing acute abnormality.   MUSCULOSKELETAL: Height loss at L1, L2, and L5 vertebral bodies noted.  IMPRESSION:   1.  No acute abnormality is seen. No perineal or pelvic fluid collection identified.  2.  Larger size of a right adrenal nodule compared to 05/23/2013. This is technically indeterminate. This could be further correlated with adrenal specific CT or MRI.  3.  Height loss noted at L1, L2, and L5 vertebral bodies.    XR CHEST 2 VIEWS  DATE: 5/30/2024   INDICATION: SOB  COMPARISON: None.  IMPRESSION: The lungs are  well-inflated which may indicate obstructive lung disease. No infiltrate or pleural effusion. The heart is normal in size. No pulmonary vascular congestion. There is a thoracic kyphosis with moderate degenerative change.

## 2024-08-12 DIAGNOSIS — Z86.0100 HISTORY OF COLONIC POLYPS: Primary | ICD-10-CM

## 2024-08-12 NOTE — PROGRESS NOTES
Assessment:   Valentina Olmedo is a 64 y.o. female with past medical history significant for epilepsy, migraine, polyneuropathy, obstructive sleep apnea, COPD, history of PE, obesity, hyperlipidemia, hypothyroidism, hypertension, osteoporosis (on Prolia) , on warfarin, chronic kidney disease stage III, tobacco abuse, schizoaffective disorder, depression, PTSD, history of thyroid cancer, history of melanoma who presents today for follow-up regarding 2 concerns:  1.  Chronic neck pain with bilateral upper extremity with associated numbness, tingling, weakness.  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  There is multilevel spondylosis with multilevel foraminal stenosis.  There is no high-grade spinal canal stenosis.  Patient had a C7-T1 interlaminar epidural steroid injection April 8, 2024 which provided significant relief of arm pain but she continues to have axial neck pain likely related to cervical facet arthropathy.  Symptoms are most pronounced in the upper cervical spine.  -Patient saw Dr. Jacobsen July 6, 2023 regarding her cervical spine findings.  She recommended weight loss prior to consideration of elective spinal surgery with a goal BMI of less than 40.  2.  Painful paresthesias in the bilateral lower extremities.  Suspect peripheral polyneuropathy.  We will check an EMG.  She does have MRI documented moderate to severe thecal sac stenosis at L4-5.           Plan:     A shared decision making plan was used.  The patient's values and choices were respected.  The following represents what was discussed and decided upon by the physician assistant and the patient.      1.  DIAGNOSTIC TESTS:  - I reviewed the CT t cervical spine.  - Reviewed the MRI lumbar spine from June 2023.  - Ordered EMG bilateral lower extremities.      2.  PHYSICAL THERAPY: Patient went to physical therapy April 2024.  She should continue with home exercises.    3.  MEDICATIONS: No  changes are made to the patient's medications.  Patient sees the pain clinic.  - Patient can continue Tylenol as needed.  - Patient takes pregabalin 50 mg 3 times daily.  - Patient takes duloxetine 120 mg daily for mood.  - Patient cannot take NSAIDs since she is on warfarin.    4.  INTERVENTIONS:  - I offer the patient right C2, C3, C4 medial branch blocks as a workup toward radiofrequency ablation.  Patient indicated she would like to proceed and an order was placed.  - If she has significant relief on the right continues to have pain on the left we could pursue left C2, C3, C4 medial branch blocks as a workup toward radiofrequency ablation.  - If arm pain worsens we could repeat the C7-T1 interlaminar epidural steroid injection.      5.  PATIENT EDUCATION: Patient is in agreement the above plan.  All questions were answered.  - Patient and I discussed surgery for her spine.  She states her neurologist recommended spine surgery.  She was told that she was not a candidate for spine surgery based on her weight previously.  We discussed getting a second surgical opinion but ultimately patient decided to continue with conservative care for now.    6.  FOLLOW-UP: My nurse will call the patient with the results of her EMG.  She will return to the clinic for her right C2, C3, C4 medial branch blocks.  If she has questions or concerns in the meantime, she should not hesitate to call.    Subjective:     Valentina Olmedo is a 64 year old female who presents today for follow-up regarding chronic neck pain.  I last saw this patient May 14, 2024.  She is following up after CT cervical spine was completed to discuss treatment options.    Patient complains of bilateral neck pain.  Pain is equally severe on both sides.  Pain is most pronounced in the upper cervical region.  She has difficulty holding her head up because her neck feels heavy and tired.  She also has pain that radiates down the arms but axial neck pain is worse and  arm pain.  Pain is aggravated with turning her neck.  She has headaches associated with the neck pain.    Patient also complains of worsening painful numbness/tingling in the legs.  She states it starts at the bottoms of her feet and then spreads up the legs all the way to the thighs.  Both legs are equally affected.    Overall, patient rates her pain today as a 4 out of 10.  Moving makes pain worse.  She denies any alleviating factors to the pain.      Treatment to date:  - Physical therapy times 1 April 2024.  - Physical therapy off-and-on for many years.  She went to 1 session of physical therapy for chronic pain April 2, 2022.  - Outpatient physical therapy was August 2022.  - She has had home care PT and OT 2023.    - Chiropractic treatment was helpful in the past,  - C7-T1 interlaminar epidural steroid injection April 8, 2024 with 80% relief  - C7-T1 interlaminar epidural steroid injection November 9, 2023 with 70% relief of pain lasting about 3 months  - No spine surgeries  - Tylenol   - Patient takes duloxetine 120 mg daily for mood  - Tizanidine 4 mg 3 times daily  -Gabapentin caused a rash  - Pregabalin  - Prednisone    Review of systems:  Positive for numbness/tingling, weakness, loss of bladder control, inability to urinate, headache, pain much worse at night, trip/number/falls, difficulty with hand skills.  Negative for loss of bowel control, difficulty swallowing, fevers, unintentional weight loss.  Objective:   CONSTITUTIONAL:  Vital signs as above.  No acute distress.  The patient is well nourished and well groomed.  Patient is observed sitting in a wheelchair.  She is morbidly obese.  PSYCHIATRIC:  The patient is awake, alert, oriented to person, place and time.  The patient is answering questions appropriately with clear speech.  Normal affect.  HEENT: Normocephalic, atraumatic.  Sclera clear.    SKIN: Exposed skin is clean, dry, intact without rashes.  MUSCULOSKELETAL: Patient is observed sitting  in wheelchair.   The patient has  5/5 strength for the bilateral shoulder abductors, elbow flexors/extensors, wrist extensors, finger flexors/abductors.  5/5 strength bilateral hip flexors, knee flexors/extensors, ankle dorsi/plantar flexors.  Tender to palpation bilateral upper cervical facets.  Cervical range of motion is restricted with lateral rotation bilaterally.  NEUROLOGICAL:   Negative Gerardo sign bilaterally.  Subjective sensory deficit bilateral lower extremity stocking distribution.     RESULTS:   I reviewed the CT cervical spine from [facility] dated July 24, 2024.  At C3-4 there is mild thickening and ossification of the ligamentum flavum.  This, in combination with a broad-based right paracentral disc osteophyte complex contributes to bilateral foraminal stenosis, more severe on the right.  At C4-5 there is ossification of the ligamentum flavum with subtle flattening of the posterior dorsal subarachnoid space.  There is a broad-based disc osteophyte and facet hypertrophic change with bilateral foraminal stenosis.  At C5-6 there is subtle flattening of the left aspect of the ventral cord related to a disc osteophyte.  There is also severe bilateral foraminal stenosis.  At C6-7 there is bilateral foraminal stenosis.  At C7-T1 there is bilateral foraminal stenosis.    I reviewed the MRI cervical spine no contrast from [facility] dated July 5, 2023.  The possible densely mineralized meningioma at C4-5 is not visualized on the MRI.  There is multilevel cervical spondylosis.  At C2-3 there is moderate right foraminal stenosis.  At C3-4 there is mild spinal canal stenosis with moderate left and severe right foraminal stenosis.  At C4-5 there is moderate spinal canal stenosis with moderate right and severe left foraminal stenosis.  At C5-6 there is moderate to severe spinal canal stenosis with severe left and moderate right foraminal stenosis.  At C6-7 there is moderate spinal canal stenosis with  severe left and moderate right foraminal stenosis.    I reviewed the MRI lumbar spine from [facility] dated June 30, 2023.  This shows chronic compression deformities at L1, L2, and L5.  There is epidural lipomatosis and multilevel spondylosis.  At L5-S1 there is mild to moderate thecal sac stenosis, mild left and mild to moderate right foraminal stenosis.  At L4-5 there is moderate to severe thecal sac stenosis and mild bilateral foraminal stenosis.

## 2024-08-13 ENCOUNTER — HOSPITAL ENCOUNTER (OUTPATIENT)
Dept: CARDIAC REHAB | Facility: HOSPITAL | Age: 64
Discharge: HOME | End: 2024-08-13
Attending: INTERNAL MEDICINE
Payer: COMMERCIAL

## 2024-08-13 ENCOUNTER — OFFICE VISIT (OUTPATIENT)
Dept: PHYSICAL MEDICINE AND REHAB | Facility: CLINIC | Age: 64
End: 2024-08-13
Payer: COMMERCIAL

## 2024-08-13 ENCOUNTER — PATIENT OUTREACH (OUTPATIENT)
Dept: ONCOLOGY | Facility: CLINIC | Age: 64
End: 2024-08-13

## 2024-08-13 VITALS
BODY MASS INDEX: 45.99 KG/M2 | SYSTOLIC BLOOD PRESSURE: 154 MMHG | HEART RATE: 87 BPM | WEIGHT: 293 LBS | DIASTOLIC BLOOD PRESSURE: 81 MMHG | HEIGHT: 67 IN

## 2024-08-13 DIAGNOSIS — M47.812 ARTHROPATHY OF CERVICAL FACET JOINT: Primary | ICD-10-CM

## 2024-08-13 DIAGNOSIS — R20.2 PARESTHESIA: ICD-10-CM

## 2024-08-13 PROCEDURE — 99214 OFFICE O/P EST MOD 30 MIN: CPT | Performed by: PHYSICIAN ASSISTANT

## 2024-08-13 PROCEDURE — G0239 OTH RESP PROC, GROUP: HCPCS

## 2024-08-13 ASSESSMENT — PAIN SCALES - GENERAL: PAINLEVEL: MODERATE PAIN (4)

## 2024-08-13 NOTE — PATIENT INSTRUCTIONS
Medial Branch Block (MBB) TEST    This is a TEST injection to find out what is causing your pain.  Specifically, this test is trying to identify whichjoint in your back or neck is causing pain.   This injection will NOT provide any long term pain relief because it is just a TEST.  Steroid is NOT used for this injection.   Steroid is what gives longterm pain relief.  Since there is no steroid used, there is no long term pain relief.    You need a  for the procedure.    Because we want to determine what is causing your pain, we need you to do a few things on the day of your Medial Branch Block :     Do not take any pain medications on the day of your Medial Branch Block/Test.  Try to do activities that make our pain increase.  We want you tohave a high level of pain on the day of the test.  This makes it easier to know the results of the test.  Other information:    You may eatand drink on the day of the test.  You should take your other medications (just not pain medications) at the times you usually take them  You will be asked to fill out a pain diary for 6 hours after thetest.    AFTER THE TEST:    Please do not take any pain medications for 6 hours after the TEST.  After the pain diary is filled out, youmay resume taking your pain medications.  Please return the pain diary to the [facility] the next day or as soon as you are able.  You will be contacted with what will happen next after the physicianreviews your pain diary.  You may be asked to come in for a follow-up appointment to discuss other treatment options  It may be recommended that you have an injection to help treatyour pain.  You may need to come in for a second set of Medial Branch Blocks (TESTS).        Please call the spine center at [phone] if you have any questions.

## 2024-08-13 NOTE — PROGRESS NOTES
Writer received referral to Cancer Risk Management/Genetic Counseling.    Referred for: evaluation of familial polyposis    Per referring note: History of colonic polyps.  Referral to  to evaluate for hereditary polyposis syndrome placed today.     Referred By    Provider Department Location Phone   Corinne Lebron PA-C [facility] [phone]       Referral reviewed for appropriate plan, and sent to New Patient Scheduling ([phone]) for completion.    Marisol Guerrero, RN, BSN  Oncology New Patient Nurse Navigator   [facility] [place] [facility]

## 2024-08-13 NOTE — LETTER
8/13/2024      Valentina Olmedo  [address]  [place] [facility] [place] [address]      Dear Colleague,    Thank you for referring your patient, Valentina Olmedo, to the [facility]. Please see a copy of my visit note below.    Assessment:   Valentina Olmedo is a 64 y.o. female with past medical history significant for epilepsy, migraine, polyneuropathy, obstructive sleep apnea, COPD, history of PE, obesity, hyperlipidemia, hypothyroidism, hypertension, osteoporosis (on Prolia) , on warfarin, chronic kidney disease stage III, tobacco abuse, schizoaffective disorder, depression, PTSD, history of thyroid cancer, history of melanoma who presents today for follow-up regarding 2 concerns:  1.  Chronic neck pain with bilateral upper extremity with associated numbness, tingling, weakness.  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  There is multilevel spondylosis with multilevel foraminal stenosis.  There is no high-grade spinal canal stenosis.  Patient had a C7-T1 interlaminar epidural steroid injection April 8, 2024 which provided significant relief of arm pain but she continues to have axial neck pain likely related to cervical facet arthropathy.  Symptoms are most pronounced in the upper cervical spine.  -Patient saw Dr. Jacobsen July 6, 2023 regarding her cervical spine findings.  She recommended weight loss prior to consideration of elective spinal surgery with a goal BMI of less than 40.  2.  Painful paresthesias in the bilateral lower extremities.  Suspect peripheral polyneuropathy.  We will check an EMG.  She does have MRI documented moderate to severe thecal sac stenosis at L4-5.           Plan:     A shared decision making plan was used.  The patient's values and choices were respected.  The following represents what was discussed and decided upon by the physician assistant and the patient.      1.  DIAGNOSTIC TESTS:  - I reviewed the CT  t cervical spine.  - Reviewed the MRI lumbar spine from June 2023.  - Ordered EMG bilateral lower extremities.      2.  PHYSICAL THERAPY: Patient went to physical therapy April 2024.  She should continue with home exercises.    3.  MEDICATIONS: No changes are made to the patient's medications.  Patient sees the pain clinic.  - Patient can continue Tylenol as needed.  - Patient takes pregabalin 50 mg 3 times daily.  - Patient takes duloxetine 120 mg daily for mood.  - Patient cannot take NSAIDs since she is on warfarin.    4.  INTERVENTIONS:  - I offer the patient right C2, C3, C4 medial branch blocks as a workup toward radiofrequency ablation.  Patient indicated she would like to proceed and an order was placed.  - If she has significant relief on the right continues to have pain on the left we could pursue left C2, C3, C4 medial branch blocks as a workup toward radiofrequency ablation.  - If arm pain worsens we could repeat the C7-T1 interlaminar epidural steroid injection.      5.  PATIENT EDUCATION: Patient is in agreement the above plan.  All questions were answered.  - Patient and I discussed surgery for her spine.  She states her neurologist recommended spine surgery.  She was told that she was not a candidate for spine surgery based on her weight previously.  We discussed getting a second surgical opinion but ultimately patient decided to continue with conservative care for now.    6.  FOLLOW-UP: My nurse will call the patient with the results of her EMG.  She will return to the clinic for her right C2, C3, C4 medial branch blocks.  If she has questions or concerns in the meantime, she should not hesitate to call.    Subjective:     Valentina Olmedo is a 64 year old female who presents today for follow-up regarding chronic neck pain.  I last saw this patient May 14, 2024.  She is following up after CT cervical spine was completed to discuss treatment options.    Patient complains of bilateral neck pain.  Pain  is equally severe on both sides.  Pain is most pronounced in the upper cervical region.  She has difficulty holding her head up because her neck feels heavy and tired.  She also has pain that radiates down the arms but axial neck pain is worse and arm pain.  Pain is aggravated with turning her neck.  She has headaches associated with the neck pain.    Patient also complains of worsening painful numbness/tingling in the legs.  She states it starts at the bottoms of her feet and then spreads up the legs all the way to the thighs.  Both legs are equally affected.    Overall, patient rates her pain today as a 4 out of 10.  Moving makes pain worse.  She denies any alleviating factors to the pain.      Treatment to date:  - Physical therapy times 1 April 2024.  - Physical therapy off-and-on for many years.  She went to 1 session of physical therapy for chronic pain April 2, 2022.  - Outpatient physical therapy was August 2022.  - She has had home care PT and OT 2023.    - Chiropractic treatment was helpful in the past,  - C7-T1 interlaminar epidural steroid injection April 8, 2024 with 80% relief  - C7-T1 interlaminar epidural steroid injection November 9, 2023 with 70% relief of pain lasting about 3 months  - No spine surgeries  - Tylenol   - Patient takes duloxetine 120 mg daily for mood  - Tizanidine 4 mg 3 times daily  -Gabapentin caused a rash  - Pregabalin  - Prednisone    Review of systems:  Positive for numbness/tingling, weakness, loss of bladder control, inability to urinate, headache, pain much worse at night, trip/number/falls, difficulty with hand skills.  Negative for loss of bowel control, difficulty swallowing, fevers, unintentional weight loss.  Objective:   CONSTITUTIONAL:  Vital signs as above.  No acute distress.  The patient is well nourished and well groomed.  Patient is observed sitting in a wheelchair.  She is morbidly obese.  PSYCHIATRIC:  The patient is awake, alert, oriented to person, place and  time.  The patient is answering questions appropriately with clear speech.  Normal affect.  HEENT: Normocephalic, atraumatic.  Sclera clear.    SKIN: Exposed skin is clean, dry, intact without rashes.  MUSCULOSKELETAL: Patient is observed sitting in wheelchair.   The patient has  5/5 strength for the bilateral shoulder abductors, elbow flexors/extensors, wrist extensors, finger flexors/abductors.  5/5 strength bilateral hip flexors, knee flexors/extensors, ankle dorsi/plantar flexors.  Tender to palpation bilateral upper cervical facets.  Cervical range of motion is restricted with lateral rotation bilaterally.  NEUROLOGICAL:   Negative Gerardo sign bilaterally.  Subjective sensory deficit bilateral lower extremity stocking distribution.     RESULTS:   I reviewed the CT cervical spine from [facility] dated July 24, 2024.  At C3-4 there is mild thickening and ossification of the ligamentum flavum.  This, in combination with a broad-based right paracentral disc osteophyte complex contributes to bilateral foraminal stenosis, more severe on the right.  At C4-5 there is ossification of the ligamentum flavum with subtle flattening of the posterior dorsal subarachnoid space.  There is a broad-based disc osteophyte and facet hypertrophic change with bilateral foraminal stenosis.  At C5-6 there is subtle flattening of the left aspect of the ventral cord related to a disc osteophyte.  There is also severe bilateral foraminal stenosis.  At C6-7 there is bilateral foraminal stenosis.  At C7-T1 there is bilateral foraminal stenosis.    I reviewed the MRI cervical spine no contrast from [facility] dated July 5, 2023.  The possible densely mineralized meningioma at C4-5 is not visualized on the MRI.  There is multilevel cervical spondylosis.  At C2-3 there is moderate right foraminal stenosis.  At C3-4 there is mild spinal canal stenosis with moderate left and severe right foraminal stenosis.  At C4-5 there is moderate  spinal canal stenosis with moderate right and severe left foraminal stenosis.  At C5-6 there is moderate to severe spinal canal stenosis with severe left and moderate right foraminal stenosis.  At C6-7 there is moderate spinal canal stenosis with severe left and moderate right foraminal stenosis.    I reviewed the MRI lumbar spine from [facility] dated June 30, 2023.  This shows chronic compression deformities at L1, L2, and L5.  There is epidural lipomatosis and multilevel spondylosis.  At L5-S1 there is mild to moderate thecal sac stenosis, mild left and mild to moderate right foraminal stenosis.  At L4-5 there is moderate to severe thecal sac stenosis and mild bilateral foraminal stenosis.          Again, thank you for allowing me to participate in the care of your patient.        Sincerely,        Lou Swanson PA-C

## 2024-08-14 ENCOUNTER — ANTICOAGULATION THERAPY VISIT (OUTPATIENT)
Dept: ANTICOAGULATION | Facility: CLINIC | Age: 64
End: 2024-08-14
Payer: COMMERCIAL

## 2024-08-14 DIAGNOSIS — J44.9 CHRONIC OBSTRUCTIVE PULMONARY DISEASE, UNSPECIFIED COPD TYPE (H): ICD-10-CM

## 2024-08-14 DIAGNOSIS — Z79.01 LONG TERM (CURRENT) USE OF ANTICOAGULANTS: ICD-10-CM

## 2024-08-14 DIAGNOSIS — Z86.711 HISTORY OF PULMONARY EMBOLISM: ICD-10-CM

## 2024-08-14 DIAGNOSIS — I26.99 PULMONARY EMBOLISM WITH INFARCTION (H): Primary | ICD-10-CM

## 2024-08-14 LAB — INR (EXTERNAL): 2.3

## 2024-08-14 NOTE — PROGRESS NOTES
ANTICOAGULATION MANAGEMENT     Valentina Olmedo 64 year old female is on warfarin with therapeutic INR result. (Goal INR 2.0-3.0)    Recent labs: (last 7 days)     08/14/24  0858   INR 2.3       ASSESSMENT     Source(s): Chart Review  Previous INR was Therapeutic last 2(+) visits  Medication, diet, health changes since last INR chart reviewed; none identified         PLAN     Recommended plan for no diet, medication or health factor changes affecting INR     Dosing Instructions: Continue your current warfarin dose with next INR in 2 weeks       Summary  As of 8/14/2024      Full warfarin instructions:  7.5 mg every Mon, Fri; 10 mg all other days   Next INR check:  8/28/2024               Telephone call with [facility] care nurse who agrees to plan and repeated back plan correctly    Orders given to  Homecare nurse/facility to recheck    Education provided: Please call back if any changes to your diet, medications or how you've been taking warfarin    Plan made per [facility] anticoagulation protocol    Melissa Montenegro, RN  Anticoagulation Clinic  8/14/2024    _______________________________________________________________________     Anticoagulation Episode Summary       Current INR goal:  2.0-3.0   TTR:  50.6% (1 y)   Target end date:  Indefinite   Send INR reminders to:  [facility]    Indications    History of pulmonary embolism (Resolved) [Z86.711]  Pulmonary embolism with infarction (H) [I26.99]  Long term (current) use of anticoagulants [Z79.01]  History of pulmonary embolism [Z86.711]             Comments:               Anticoagulation Care Providers       Provider Role Specialty Phone number    Promise Vanegas MD Referring Family Medicine [phone]    Donald Rooney MD Referring Internal Medicine [phone]    Jose Maria Morin MD Referring Family Medicine [phone]

## 2024-08-15 ENCOUNTER — HOSPITAL ENCOUNTER (OUTPATIENT)
Dept: CARDIAC REHAB | Facility: HOSPITAL | Age: 64
Discharge: HOME | End: 2024-08-15
Attending: INTERNAL MEDICINE
Payer: COMMERCIAL

## 2024-08-15 PROCEDURE — G0239 OTH RESP PROC, GROUP: HCPCS

## 2024-08-15 RX ORDER — UMECLIDINIUM 62.5 UG/1
1 AEROSOL, POWDER ORAL DAILY
OUTPATIENT
Start: 2024-08-15

## 2024-08-19 ENCOUNTER — MEDICAL CORRESPONDENCE (OUTPATIENT)
Dept: HEALTH INFORMATION MANAGEMENT | Facility: CLINIC | Age: 64
End: 2024-08-19
Payer: COMMERCIAL

## 2024-08-19 DIAGNOSIS — Z53.9 DIAGNOSIS NOT YET DEFINED: Primary | ICD-10-CM

## 2024-08-19 PROCEDURE — G0179 MD RECERTIFICATION HHA PT: HCPCS | Performed by: FAMILY MEDICINE

## 2024-08-20 ENCOUNTER — HOSPITAL ENCOUNTER (OUTPATIENT)
Dept: CARDIAC REHAB | Facility: HOSPITAL | Age: 64
Discharge: HOME | End: 2024-08-20
Attending: INTERNAL MEDICINE
Payer: COMMERCIAL

## 2024-08-20 PROCEDURE — G0239 OTH RESP PROC, GROUP: HCPCS

## 2024-08-21 ENCOUNTER — TELEPHONE (OUTPATIENT)
Dept: ANTICOAGULATION | Facility: CLINIC | Age: 64
End: 2024-08-21
Payer: COMMERCIAL

## 2024-08-21 NOTE — TELEPHONE ENCOUNTER
Terry called in about the patient possibly having a SI injection on 8/28/2024. Asking about changes in the warfarin dosing. Review of chart and unable to see a future SI injection scheduled.    Informed to go in and see the patient and question about a procedure and call back with additional information.    Kindra Garay RN    [facility]

## 2024-08-26 ENCOUNTER — OFFICE VISIT (OUTPATIENT)
Dept: PALLIATIVE MEDICINE | Facility: OTHER | Age: 64
End: 2024-08-26
Attending: NURSE PRACTITIONER
Payer: COMMERCIAL

## 2024-08-26 VITALS — HEART RATE: 74 BPM | SYSTOLIC BLOOD PRESSURE: 112 MMHG | DIASTOLIC BLOOD PRESSURE: 69 MMHG | OXYGEN SATURATION: 95 %

## 2024-08-26 DIAGNOSIS — M48.02 CERVICAL STENOSIS OF SPINAL CANAL: Primary | ICD-10-CM

## 2024-08-26 PROCEDURE — G2211 COMPLEX E/M VISIT ADD ON: HCPCS | Performed by: NURSE PRACTITIONER

## 2024-08-26 PROCEDURE — 99215 OFFICE O/P EST HI 40 MIN: CPT | Performed by: NURSE PRACTITIONER

## 2024-08-26 PROCEDURE — G0463 HOSPITAL OUTPT CLINIC VISIT: HCPCS | Performed by: NURSE PRACTITIONER

## 2024-08-26 RX ORDER — PREGABALIN 25 MG/1
25 CAPSULE ORAL 2 TIMES DAILY
Qty: 60 CAPSULE | Refills: 0 | Status: SHIPPED | OUTPATIENT
Start: 2024-08-26 | End: 2024-10-01

## 2024-08-26 ASSESSMENT — PAIN SCALES - GENERAL: PAINLEVEL: MILD PAIN (3)

## 2024-08-26 NOTE — PROGRESS NOTES
"Date:08/26/2024      COMPREHENSIVE PAIN CLINIC FOLLOW UP EVALUATION    I had the pleasure of meeting Ms. Valentina Olmedo on 11/7/2023 in the [facility] in consult for [facility] with regards to her pain.  The patient is a 63 year old female with past medical history of chronic anticoagulation, h/o PE, physical deconditioning, CHRIS, GERD, seizure disorder, h/o CVA, cervical stenosis, chronic intractable pain, severe morbid obesity, schizoaffective disorder/depression, borderline personality disorder, PTSD, osteoporosis on Prolia, who presents in follow up for evaluation of chronic pain.  Patient is on warfarin.      Updates since last appointment on 07/22/2024.  Presents in a wheelchair with Ayden.    She is experiencing dizziness and would like to decrease lyrica to 25mg BID.    Reviewed Cervical CT  08/28/2024 R) Cervical C2,3,4 MBB and possible RFA through [facility]    Recent dx asthma following with pulmonology.      Interventions/Injections:   C7-T1 VIOLETA at [facility].    Progress Notes Reviewed:  08/13/2024 [facility] -       Any hospitalizations/ER/UC visits since last appointment:  no  Any falls/accidents since last appointment:  no        Primary Pain :   Today b/l knee pain R>L  - She wants to follow up at [facility].    2.    Patient endorses chronic pain in cervical spine - [facility]..       Characteristics:  Any changes in pain characteristics since last appointment?  no    Cervical pain  Her arms feel weak.  Patient has constant numbness and tingling in b/l fingers.  Patient has not had any spine surgery in the past.  The patient describes the pain as constant, sharp, pressure, heavy, throbbing, dull, shooting.       What makes the pain better:  She reports that the pain is made better by \"unknown\".   What makes the pain worse:   She reports that the pain is made worse by \"unknown\".       08/26/2024 current pain on 0/10 VAS:       Worst " pain:        Best pain:       07/22/2024 current pain on 0/10 VAS:   7    Worst pain:  9   Best pain: 3  05/20/2024 current pain on 0/10 VAS:   7  Worst pain:   9    Best pain: 1  04/09/2024 current pain on 0/10 VAS:   6   Worst pain:  9    Best pain: 1       11/08/2023 current pain score at 4/10, but it can be as low as 4/10 or as severe as 9/10.        Current Pain Related Medications:  Any medications changes since last appointment: no    RN sets up medications once a week.    Pregabalin 75mg TID - currently titrating to a goal of 100mg TID  Tegretol 200mg am and 400pm and 200mg q hs  Duloxetine 60mg 2 caps daily  Meclizine 25mg 1/2 to 1 tab prn dizziness - discontinued  Melatonin 3mg q hs  Oxybutynin ER  Prazosin 2mg 1 tab q hs  Seroquel 25mg BID - may increase in the future  Chantix 1mg BID  Warfarin 5mg per anticoagulation clinic   Briviact 100mg TID - epilepsy      Therapies discuss on initial consult:   Physical therapy, Pain Psychology, TENs unit, Grounding Mat, Frequency Specific Micro Current, Anti-inflammatory Lifestyle      Employment:  On [employer].     Exercise:  Last PT was 3-4 months ago.  She continues to do exercises on a regular basis.  Patient is attending hand therapy [facility] twice weekly.      Hobbies:  She does her HEP.    Mental Health:    Patient endorses chronic anxiety and depression.  Patient does follow with a [facility]ana Morgan mental health care provider every few month.              Plan on 07/22/2024:  Reviewed R) knee xray 11/2023.       No medication changes today.  RN fills medication box for the week.    She needed [facility].    She wants to follow up at [facility] for her knee pain and do PT for knee pain R>L at [facility].    Follow-up in clinic 3 months.          Updates since last appointment on 05/20/2024.  She presents in a wheel chair with Al. She is confused about her dosages of medications.  She thinks she did not increase lyrica to 75mg TID due to dizziness.    Having more  "knee pain R>L.  She follows with [facility]. Constant pain that varies in intensity. Dull to achey and sharp. Improved with ice and rest.  Worse with walking.  She would like to do PT through [facility].    [facility] Follow-up with Lou Swanson PA-C unknown - last appt 04/2024 - can't remember when last injection for her neck was.    Dr. Puente, Psychiatrist - patient asking about seroquel doseage change      Interventions/Injections:   none    Progress Notes Reviewed:  07/18/2024 Dr. Bhatt Rheumatology -   does not have evidence to suggest connective tissue disease/inflammatory joint disease. She does have beginning of osteoarthritis.   07/09/2024 Breana Puente, Psychiatry - schizoaffective disorder, borderline personality disorder, PTSD, seizure disorder  06/17/2024 Dr. Ana Mancilla, General surgery -  R) upper lateral thigh hematoma      Any hospitalizations/ER/UC visits since last appointment:  no  Any falls/accidents since last appointment:  no      Primary Pain :   Today b/l knee pain R>L     2.    Patient endorses chronic pain in cervical spine L>R that started years ago without a precipitating event.  Pain radiates down b/l arms R>L and into her hands.       Characteristics:  Any changes in pain characteristics since last appointment?  no  Cervical pain  Her arms feel weak.  Patient has constant numbness and tingling in b/l fingers.  Patient has not had any spine surgery in the past.  The patient describes the pain as constant, sharp, pressure, heavy, throbbing, dull, shooting.       What makes the pain better:  She reports that the pain is made better by \"unknown\".   What makes the pain worse:   She reports that the pain is made worse by \"unknown\".   07/22/2024 current pain on 0/10 VAS:   7    Worst pain:  9   Best pain: 3  05/20/2024 current pain on 0/10 VAS:   7  Worst pain:   9    Best pain: 1  04/09/2024 current pain on 0/10 VAS:   6   Worst pain:  9    Best pain: 1       11/08/2023 current pain score at 4/10, " but it can be as low as 4/10 or as severe as 9/10.        Current Pain Related Medications:  RN sets up medications once a week.    Pregabalin 75mg TID - currently titrating to a goal of 100mg TID  Tegretol 200mg am and 400pm and 200mg q hs  Duloxetine 60mg 2 caps daily  Meclizine 25mg 1/2 to 1 tab prn dizziness - discontinued  Melatonin 3mg q hs  Oxybutynin ER  Prazosin 2mg 1 tab q hs  Seroquel 25mg BID - may increase in the future  Chantix 1mg BID  Warfarin 5mg per anticoagulation clinic   Briviact 100mg TID - epilepsy    Therapies discuss on initial consult:   Physical therapy, Pain Psychology, TENs unit, Grounding Mat, Frequency Specific Micro Current, Anti-inflammatory Lifestyle    Employment:  On SSD.     Exercise:  Last PT was 3-4 months ago.  She continues to do exercises on a regular basis.  Patient is attending hand therapy [facility] twice weekly.      Hobbies:  She does her HEP.    Mental Health:    Patient endorses chronic anxiety and depression.  Patient does follow with a [facility] mental health care provider every few month.              Updates since last appointment on 04/09/2024.  Presents with AL, Significant other with a wheelchair.  .  Last script for lyrica 50mg TID 84 tabs for 28 days.  Side Effects: none will increase today 75mg TID    Tens unit: [employer] brochure for patient support provided.  CT scan 07/24/2024 04/17/2024 Dr. Bhatt, Rheumatology - blood work ordered    PT at [facility]    She has been referred for pulmonary rehab.    RN comes every Tuesday to refill her medications.      Employment:  On [employer].     Exercise:  Last PT was 3-4 months ago.  She continues to do exercises on a regular basis.  Patient is attending hand therapy [facility] twice weekly.      Hobbies:  She does her HEP.    Mental Health:    Patient endorses chronic anxiety and depression.  Patient does follow with a [facility] mental health care provider every few month.      04/08/2025 [facility]TA C7-T1 at [facility].  [facility]  "recommends  for a repeat CT cervical spine in 1 year to monitor the densely calcified lesion. This is scheduled for July 24, 2024.     She is attending PT.    B/l occipital nerve blocks with Dr. Zeng - not done    She has been referred to [facility] for her b/l knee pain.  Referred to Neurology for evaluate peripheral neuropathy and epilepsy.   07/06/2023 Dr. Jacobsen recommends weight loss prior to consideration of elective spinal surgery with goal BMI less than 40.         Interventions/Injections:     Progress Notes Reviewed:  05/14/2024 [facility]wood [facility]  We discussed medial branch blocks as a workup for radiofrequency ablation today.  We decided that she would wait for her updated CT cervical spine first.  If the calcified lesion has changed, she would need to follow-up with Dr. Jacobsen.  If it is stable, we could consider medial branch blocks.  Based on her exam today I would recommend left-sided medial branch blocks in the mid cervical region.  - As long as patient has at least 50% relief of pain for at least 3 months we can also repeat the repeat C7-T1 interlaminar epidural steroid injection.      Any hospitalizations/ER/UC visits since last appointment:  no  Any falls/accidents since last appointment:  no      Primary Pain :  Patient endorses chronic pain in cervical spine L>R that started years ago without a precipitating event.  Pain radiates down b/l arms R>L and into her hands.       Characteristics:  No changes in pain characteristics since last appointment.      Her arms feel weak.  Patient has constant numbness and tingling in b/l fingers.  Patient has not had any spine surgery in the past.  The patient describes the pain as constant, sharp, pressure, heavy, throbbing, dull, shooting.       What makes the pain better:  She reports that the pain is made better by \"unknown\".   What makes the pain worse:   She reports that the pain is made worse by \"unknown\".   05/20/2024 current pain on 0/10 " VAS:   7  Worst pain:   9   Best pain: 1  04/09/2024 current pain on 0/10 VAS:   6   Worst pain:  9     Best pain: 1       11/08/2023 current pain score at 4/10, but it can be as low as 4/10 or as severe as 9/10.      Current Pain Related Medications:  Any medications changes since last appointment:    Pregabalin 50mg TID - currently titrating to a goal of 100mg TID  Tegretol 200mg am and 400pm and 200mg q hs  Duloxetine 60mg 2 caps daily  Meclizine 25mg 1/2 to 1 tab prn dizziness - discontinued  Melatonin 3mg q hs  Oxybutynin ER  Prazosin 2mg 1 tab q hs  Seroquel 25mg BID  Chantix 1mg BID  Warfarin 5mg per anticoagulation clinic   Briviact 100mg TID - epilepsy      NOTE:  gabapentin causes rash, Lamictal causes dizziness, Pregabalin 25mg TID  Tegretol 200mg am and 400pm and 200mg q hs  Duloxetine 60mg 2 caps daily  Meclizine 25mg 1/2 to 1 tab prn dizziness  Melatonin 3mg q hs  Oxybutynin ER  Prasoin 2mg 1 tab q hs  Seroquel 25mg BID  Chantix 1mg BID  Warfarin 5mg per anticoagulation clinic   Briviact 100mg TID - epilepsy      NOTE:  gabapentin causes rash, Lamictal causes dizziness, Tizanidine 4mg TID - discontinued      Therapies discuss on initial consult:   Physical therapy, Pain Psychology, TENs unit, Grounding Mat, Frequency Specific Micro Current, Anti-inflammatory Lifestyle    Social:  Patient is  to Al and lives in an apartment in [place].  She is independent in ADL's.  She has a cat.    Employment: [employer]    Exercise:  Last PT was 3-4 months ago.  She continues to do exercises on a regular basis.  Patient is attending hand therapy [facility] twice weekly.    Hobbies:    Mental Health:    Patient endorses chronic anxiety and depression.  Patient does follow with a [facility] mental health care provider every few months.            Plan on 04/09/2024:  Increase lyrica by 1 25mg tab every 3 days until taking lyrica 25mg 2 tabs TID to help manage neuropathic pain.      Follow-up in clinic in 1 month to  "re-evaluate neuropathic pain.             -------------------------------------------------------------------------------------------------------------------------------------  History of pain on initial consult 11/07/2023  Subjective: She presents with Al in a wheel chair.    Patient endorses chronic pain in cervical spine L>R that started years ago without a precipitating event.  Pain radiates down b/l arms R>L and into her hands.  Her arms feel weak.  Patient has constant numbness and tingling in b/l fingers .  Patient has not had any spine surgery in the past.  The patient describes the pain as constant, sharp, heavy, throbbing, dull, shooting.  She reports that the pain is made worse by \"unknown\".  Her pain is improved with \"unknown\".  She rates her currenty pain score at 4/10, but it can be as low as 4/10 or as severe as 9/10. She is [facility] on [facility]2023.  She uses a wheel chair at times due to injury to low back from a fall. She fx her L) elbow in September.      Patient denies any anxiety or depression.  Patient does follow with a Breana Morgan, mental health care provider every few month.  Last PT was 3-4 months ago.  She continues to do exercises on a regular basis.  Patient is attending hand therapy [facility] twice weekly.    Progress Notes Reviewed:  10/18/2023 Dr. Gracie Jacobsen, Neurological Surgery - cervical stenosis/chronic pain.  She needs to lose weight prior to surgery.  Discussed C7-T1 VIOLETA.  She wants to avoid surgery as long as she can.  10/17/2023 Dr. Mariann Puente -   10/13/2023 [facility]    She denies any new problems with falls or balance, any new numbness or weakness of the arms or legs, any new bowel or bladder incontinence, any night sweats or unexplained fevers, or any sudden or unexpected weight loss.      Valentina Olmedo has not been seen at a pain clinic in the past.        Current Treatments:  She does not know her medication " without looking them up on her phone.  Pregabalin 25mg TID per [facility]  Tegretol 200mg am and pm and 400mg q hs per Dr. Benitez for epilepsy  Meclizine 25mg 1/2 to 1 tab BID-TID dizziness - vertigo  Melatonin 3mg q hs  Oxybutynin ER - overactive bladder  Prasoin 2mg 1 tab q hs per Dr. Puente  Seroquel 25mg BID per Dr. Puente  Tizanidine 4mg TID  Chantix 1mg BID  Warfarin 5mg per anticoagulation clinic   Briviact 100mg TID - epilepsy    NOTE:  gabapentin causes rash, Lamictal causes dizziness    Previous Medication Treatments Included:  Anti-convulsants: gabapentin causes rash  Muscle relaxors:   Anti-depressants: Lamictal causes dizziness, Duloxetine 60mg 2 caps daily per Dr. Puente  Benzodiazapine's:   Acetaminophen/NSAIDs: No NSAID due to warfarin, acetaminophen is not effective  Topicals: biofreeze causes itching, burning,  Opioids: not suppose to take opioids with warfarin  Coumadin    Other Treatments Have Included:  Physical therapy: PT 3 months ago, currently in hand therapy at [facility]  Pain Psychology:   Chiropractic:   Acupuncture:   TENs Unit:   Injections:   Surgeries:   Dry Needling:   Massage:      Past Medical History:  Medical history reviewed.  Past Medical History:   Diagnosis Date    Adrenal mass (H24) 10/12/2015    Chen Null MD  They were incidentally discovered on the CAT scan of the abdomen from December 2011 which was done for evaluation of kidney stones. F/up CT of the abdomen done on 6/26/12 showed a stable 5 mm nodular opacity in the right lower lung lobe, adjacent to the diagram. Bilateral adrenal masses average density measured less than 0 Hounsfield units, consistent with benign etio    Age-related cataract of both eyes, unspecified age-related cataract type 03/27/2024    Anxiety     Anxiety     Arthritis     Borderline personality disorder (H)     Cancer (H)     COPD (chronic obstructive pulmonary disease) (H)     Depression     Depressive disorder     History  of COVID-19     Hyperlipidemia     Hypertension     Hypertension     Hyponatremia     Hypothyroidism     Malignant neoplasm of thyroid gland (H)     Chen Null MD  She had R hemithyroidectomy for a right neck tumor in 1994. According to the patient, the tumor was benign. She is not sure whether or not the tumor belonged to the thyroid gland. The surgery was done here at the [facility]. In January 2011, she was diagnosed with kidney stones. She was found to have an elevated calcium level of 11.7, with a PTH level of 368. Stone an    Primary hyperparathyroidism (H24)           PTSD (post-traumatic stress disorder)     Pulmonary embolism with infarction (H) 01/12/2021    Recurrent otitis media     Seizure disorder (H)     Sleep apnea     cpap at [facility]    Stroke (H)     Tobacco abuse     Vertigo       Patient Active Problem List   Diagnosis    Essential hypertension    CHRIS (obstructive sleep apnea)    Seasonal and perennial allergic rhinoconjunctivitis of right eye    Closed head injury    History of thyroid cancer    Adrenal mass, left (H24)    Class 3 severe obesity due to excess calories with serious comorbidity and body mass index (BMI) of 50.0 to 59.9 in adult (H)    Nonintractable epilepsy without status epilepticus (H)    Esophageal reflux    Mixed hyperlipidemia    HLD (hyperlipidemia)    Hypothyroidism    Chronic obstructive pulmonary disease (H)    Schizoaffective disorder, depressive type (H)    DNR (do not resuscitate)    Migraine headache    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)    Fracture of vertebra due to osteoporosis with routine healing, subsequent encounter    Major depressive disorder, recurrent episode, moderate (H)    Venous stasis    Peripheral polyneuropathy    Pulmonary embolism with infarction (H)    Idiopathic osteoporosis    Physical deconditioning    PTSD (post-traumatic stress disorder)    Long term (current) use of anticoagulants    History of melanoma    Seasonal affective  disorder (H24)    Vision changes    Bilateral hand pain    Problem related to housing and economic circumstances    History of pulmonary embolism    Medial knee pain, right    Bilateral sensorineural hearing loss    Intertrigo    Dysfunction of both eustachian tubes    Chronic intractable pain    Strain of lumbar paraspinal muscle, initial encounter    Cervical stenosis of spinal canal    Cervical radiculopathy    History of colonic polyps    Hematoma of right lower leg    Nicotine use disorder         Past Surgical History:  Pertinent surgical history reviewed.  Past Surgical History:   Procedure Laterality Date    ABDOMEN SURGERY      ADRENALECTOMY Left     BRAIN SURGERY      COLONOSCOPY N/A 5/7/2024    Procedure: WITH COLONOSCOPY with polypectomies;  Surgeon: Walt Lira MD;  Location: [facility] OR    CRANIOTOMY FOR TEMPORAL LOBECTOMY Right     x3 for seizure    DILATION AND CURETTAGE      ESOPHAGOSCOPY, GASTROSCOPY, DUODENOSCOPY (EGD), COMBINED N/A 5/7/2024    Procedure: and biopsies;  Surgeon: Walt Lira MD;  Location: [facility] OR    ESOPHAGOSCOPY, GASTROSCOPY, DUODENOSCOPY (EGD), DILATATION, COMBINED N/A 5/7/2024    Procedure: ESOPHAGOGASTRODUODENOSCOPY with esophageal dilation;  Surgeon: Walt Lira MD;  Location: [facility] OR    HEAD & NECK SURGERY      HYSTERECTOMY, PAP NO LONGER INDICATED N/A     LITHOTRIPSY      PARATHYROID GLAND SURGERY      resection of adenoma    REFRACTIVE SURGERY Bilateral     RELEASE CARPAL TUNNEL Bilateral     TONSILLECTOMY & ADENOIDECTOMY      TOTAL THYROIDECTOMY      TOTAL VAGINAL HYSTERECTOMY      39 years old. Benign          Medications: Pertinent medications reviewed.  Current Outpatient Medications   Medication Sig Dispense Refill    albuterol (PROAIR HFA) 108 (90 Base) MCG/ACT inhaler Inhale 2 puffs into the lungs every 4 hours as needed for shortness of breath or wheezing 8 g 4    ARIPiprazole (ABILIFY) 15 MG tablet Take 1 tablet (15 mg) by  mouth every morning 30 tablet 2    azelastine (ASTELIN) 0.1 % nasal spray       Brivaracetam (BRIVIACT) 100 MG tablet Take 150 mg by mouth 2 times daily       carBAMazepine (TEGRETOL) 200 MG tablet 200mg in the  at lunch and 400mg at HS      denosumab (PROLIA) 60 MG/ML SOSY injection Inject 60 mg Subcutaneous once Per pt report      DULoxetine (CYMBALTA) 60 MG capsule Take 2 capsules (120 mg) by mouth daily 180 capsule 3    EPINEPHrine (ANY BX GENERIC EQUIV) 0.3 MG/0.3ML injection 2-pack Inject 0.3 mLs (0.3 mg) into the muscle as needed for anaphylaxis 2 each 1    esomeprazole (NEXIUM) 40 MG DR capsule TAKE 1 CAPSULE BY MOUTH EVERY MORNING BEFORE BREAKFAST (TAKE 30-60 MINUTES BEFORE EATING) 28 capsule 2    fexofenadine (ALLEGRA) 180 MG tablet TAKE 1 TABLET BY MOUTH DAILY 90 tablet 3    fluticasone (FLONASE) 50 MCG/ACT nasal spray INHALE 1 SPRAY IN EACH NOSTRIL DAILY 16 g 2    fluticasone-salmeterol (ADVAIR-HFA) 230-21 MCG/ACT inhaler Inhale 2 puffs into the lungs 2 times daily 12 g 12    GAVILYTE-G 236 g suspension       GEMTESA 75 MG TABS tablet       levothyroxine (SYNTHROID/LEVOTHROID) 300 MCG tablet TAKE 1 TABLET BY MOUTH DAILY 28 tablet 12    melatonin 3 MG tablet Take 1 tablet (3 mg) by mouth nightly as needed for sleep 90 tablet 4    multivitamin with iron (CHROMAGEN) TABS half-tab Take 1 tablet by mouth daily      nystatin (MYCOSTATIN) 085396 UNIT/GM external cream Apply topically 2 times daily as needed (skin infection/inflammation) (Apply to the most sensitive inflamed areas of skin) 90 g 2    polyethylene glycol (MIRALAX) 17 GM/Dose powder       polymixin b-trimethoprim (POLYTRIM) 30541-9.1 UNIT/ML-% ophthalmic solution Place 1-2 drops into the right eye every 4 hours 10 mL 0    prazosin (MINIPRESS) 2 MG capsule Take 1 capsule (2 mg) by mouth at bedtime 90 capsule 1    prednisoLONE acetate (PRED FORTE) 1 % ophthalmic suspension       pregabalin (LYRICA) 75 MG capsule Take 1 capsule (75 mg) by mouth  2 times daily 60 capsule 2    QUEtiapine (SEROQUEL) 25 MG tablet Take 1-2 tablets (25-50 mg) by mouth daily For agitation. 180 tablet 4    rosuvastatin (CRESTOR) 20 MG tablet TAKE 1 TABLET BY MOUTH DAILY 90 tablet 2    salmeterol (SEREVENT DISKUS) 50 MCG/ACT inhaler INHALE 1 PUFF INTO THE LUNGS TWICE A DAY 60 each 11    senna (SENNA-TIME) 8.6 MG tablet TAKE 2 TABLETS (17.2MG) BY MOUTH TWICE A  tablet 11    SUMAtriptan (IMITREX) 50 MG tablet Take 1 tablet (50 mg) by mouth at onset of headache for migraine May repeat in 2 hours. Max 4 tablets/24 hours. 12 tablet 3    varenicline (CHANTIX) 1 MG tablet TAKE 1 TABLET BY MOUTH TWICE A DAY 56 tablet 0    warfarin ANTICOAGULANT (COUMADIN) 5 MG tablet TAKE 2 TABLETS BY MOUTH ON MON AND FRI ; TAKE 2 & 1/2 TABLETS BY MOUTH ALL OTHER DAYS AS DIRECTED BY INR CLINIC 56 tablet 11    zolpidem (AMBIEN) 5 MG tablet Take tablet by mouth 15 minutes prior to sleep, for Sleep Study 1 tablet 0       [place] Prescription Monitoring Program reviewed 07/22/2024.  No concern for abuse or misuse of controlled medications based on this report.  07/15/024 Briviact 100mg 84 tabs for 28 days  07/15/2024 Pregabalin 75mg 56 tabs for 28 days.    Allergies: Pertinent allergies reviewed.     Allergies   Allergen Reactions    Aspirin Shortness Of Breath    Bees Anaphylaxis    Lamotrigine Dizziness and Unknown    Latex Itching    Phenobarbital      aggression    Vistaril [Hydroxyzine] Other (See Comments)     Sluggish, unable to wake up    Gabapentin Rash       Family History:   family history includes Alcoholism in her brother and sister; Chronic Obstructive Pulmonary Disease in her mother; Depression in her sister; Diabetes in her sister; Lung Cancer (age of onset: 76) in her maternal grandfather; No Known Problems in her sister; Other - See Comments in her brother and father; Skin Cancer in her father.    Social History:   Patient is  and lives in an apartment in [place].  She is  independent in ADL's.  She has a cat.  She  reports that she has been smoking cigarettes. She has never been exposed to tobacco smoke. She has never used smokeless tobacco. She reports that she does not drink alcohol and does not use drugs.  Social History     Social History Narrative    Not on file         Physical Exam:  LMP  (LMP Unknown)         Constitutional: She is oriented to person, place, and time.  She appears well-developed and well-nourished. She is not in acute distress. Morbidly obese.  HENT:     Head: Normocephalic and atraumatic.     Eyes: Pupils are equal, round, and reactive to light. EOM are normal. No scleral icterus.   Pulmonary/Chest:  NWOB. No respiratory distress.   Neurological: She is alert and oriented to person, place, and time. Coordination grossly normal.   Skin: Skin is warm and dry. She is not diaphoretic.   Psychiatric: She has a normal mood and affect. Her behavior is normal. Judgment and thought content normal.  Patient answers questions appropriately.  MSK: presents in a wheel chair        Imaging:  EXAM: CT CERVICAL SPINE W/O CONTRAST  LOCATION: [facility]  DATE: 7/24/2024     INDICATION: follow up possible cervical meningioma  COMPARISON: 7/5/2023. 1/8/2024.  TECHNIQUE: Routine CT Cervical Spine without IV contrast. Multiplanar reformats. Dose reduction techniques were used.     FINDINGS:  No evidence of cervical spine fracture. No evidence of prevertebral soft tissue swelling. Limited views the paraspinous soft tissues, mucosal spaces of the neck and lung apices are clear. Hypoplastic thyroid tissue presumably represents history of   possible thyroid ablation. This finding should be correlated with patient's clinical history.     Craniovertebral junction and C1-C2: Normal.     C2-C3: Right-sided facet degeneration narrows the right foramen. Spinal canal and left foramen are patent.      C3-C4: Broad-based central/right paracentral ventral  osteophyte/disc complex attenuates the anterior/right subarachnoid space. Bilateral uncovertebral joint and facet hypertrophic change results in bilateral foraminal stenosis which is more severe on the   right. Mild thickening and ossification of the ligamentum flavum.     C4-C5: Degenerative change at C4-C5 with ossification of the ligamentum flavum which results in subtle flattening of the posterior/right aspect of the dorsal subarachnoid space. Broad-based central ventral osteophyte/disc complex at this level attenuates   the anterior subarachnoid space. Bilateral uncovertebral joint and facet hypertrophic change results in bilateral foraminal stenosis.      C5-C6: Degenerative change at C5-C6 with broad-based central/left paracentral ventral osteophyte/disc complex results in contact and subtle flattening of the anterior/left aspect of the cervical cord with thickening of the ligamentum flavum which   attenuates the dorsal subarachnoid space. Bilateral uncovertebral joint and facet hypertrophic change result in severe bilateral foraminal stenosis.      C6-C7: Degenerative change at C6-C7 with broad-based central/left paracentral ventral osteophyte/disc complex which attenuates the anterior/left subarachnoid space. Bilateral uncovertebral joint hypertrophic change results in bilateral foraminal   stenosis.     C7-T1: Minimal central ventral osteophyte/disc complex attenuates the anterior subarachnoid space. Bilateral uncovertebral joint and facet hypertrophic change result in bilateral foraminal stenosis.                                                                       IMPRESSION:  1.  Degenerative change at C2-3 with right-sided foraminal narrowing.     2.  Degenerative change at C3-C4 with broad-based central/right paracentral ventral osteophyte/disc complex which attenuates the anterior/right subarachnoid space. Bilateral uncovertebral joint and facet hypertrophic change result in bilateral foraminal    stenosis which is more severe on the right. Mild thickening and ossification of the ligamentum flavum.     3.  Degenerative change at C4-5 with ossification of the ligamentum flavum which results in subtle flattening of the posterior/right aspect of the dorsal subarachnoid space. Broad-based central ventral osteophyte/disc complex attenuates the anterior   subarachnoid space. Bilateral uncovertebral joint and facet hypertrophic change contribute to bilateral foraminal stenosis.     4.  Degenerative change at C5-6 with broad-based central/left paracentral ventral osteophyte/disc complex which results in contact and subtle flattening of the anterior/left aspect of the cervical cord with thickening of the ligamentum flavum which   attenuates the dorsal subarachnoid space. Bilateral uncovertebral joint and facet hypertrophic change resulting in severe bilateral foraminal stenosis.     5.  Degenerative change at C6-7 with broad-based central/left paracentral ventral osteophyte/disc complex which attenuates the anterior/left subarachnoid space. Bilateral foraminal stenosis.     6.  Mild degenerative change at C7-T1 with minimal central ventral osteophyte/disc complex which attenuates the anterior subarachnoid space. Bilateral uncovertebral joint and facet hypertrophic change result in bilateral foraminal stenosis.        EXAM: XR KNEE RIGHT 3 VIEWS  LOCATION: [facility]  DATE: 11/17/2023     INDICATION: Medial right knee pain.  COMPARISON: None.                                                                      IMPRESSION: No fracture. No degenerative changes. Small calcific density along the superomedial margin of the medial femoral condyle compatible with remote MCL injury.          DEXA was reviewed today.  IMPRESSION: Low bone density (OSTEOPENIA) with interval increase in BMD from the prior scan. T score meets the WHO criteria for low bone density (osteopenia) at one or more measured  sites. The risk of osteoporotic fracture increases approximately two-fold for each standard deviation decrease in T-score.      EMG:  na      Diagnosis:  (M48.02) Cervical stenosis of spinal canal  (primary encounter diagnosis)  Comment:   Plan:     (M54.81) Bilateral occipital neuralgia  Comment:   Plan:     (M54.12) Cervical radiculopathy  Comment:   Plan:       (G89.29) Chronic intractable pain  Comment:   Plan:           Plan on 08/26/2024:  Decrease lyrica to 25mg BID - If still experiencing dizziness then discontinue.    No other medication changes today.    Follow up with [facility] for b/l knee pain.    [facility]ervical MBB and possible RFA.    Follow-up in clinic in 1 month.      SHAHRZAD Hernandez, RN, CNP, FNP  [facility]        BILLING TIME DOCUMENTATION:   The total TIME spent on this patient on the date of the encounter/appointment was 40 minutes.

## 2024-08-26 NOTE — PROGRESS NOTES
Patient presents to the clinic today for a visit  with SHAHRZAD Alejo CNP            5/20/2024     1:44 PM 7/22/2024     3:49 PM 8/26/2024     1:31 PM   PEG Score   PEG Total Score 3 4.33 3.33       UDS/CSA-    Medications-    QUESTIONS:    Ana Leon MA  [facility] [place]

## 2024-08-26 NOTE — PATIENT INSTRUCTIONS
Plan on 08/26/2024:  Decrease lyrica to 25mg BID - If still experiencing dizziness then discontinue.    No other medication changes today.    Follow up with [facility] for b/l knee pain.    [facility] for Cervical MBB and possible RFA.    Follow-up in clinic in 1 month.      SHAHRZAD Hernandez, RN, CNP, FNP  [facility]    Clinic Number:  [phone]  Call with any questions about your care and for scheduling assistance.   Calls are returned Monday through Friday between 8 AM and 4:30 PM. We usually get back to you within 2 business days depending on the issue/request.    If we are prescribing your medications:  For opioid medication refills, call the clinic or send a [employer] message 7 days in advance.  Please include:  Name of requested medication  Name of the pharmacy.  For non-opioid medications, call your pharmacy directly to request a refill. Please allow 3-4 days to be processed.   Per [place] State Law:  All controlled substance prescriptions must be filled within 30 days of being written.    For those controlled substances allowing refills, pickup must occur within 30 days of last fill.      We believe regular attendance is key to your success in our program!    Any time you are unable to keep your appointment we ask that you call us at least 24 hours in advance to cancel.This will allow us to offer the appointment time to another patient.   Multiple missed appointments may lead to dismissal from the clinic.

## 2024-08-28 ENCOUNTER — RADIOLOGY INJECTION OFFICE VISIT (OUTPATIENT)
Dept: PHYSICAL MEDICINE AND REHAB | Facility: CLINIC | Age: 64
End: 2024-08-28
Attending: PHYSICIAN ASSISTANT
Payer: COMMERCIAL

## 2024-08-28 ENCOUNTER — ANTICOAGULATION THERAPY VISIT (OUTPATIENT)
Dept: ANTICOAGULATION | Facility: CLINIC | Age: 64
End: 2024-08-28

## 2024-08-28 ENCOUNTER — TELEPHONE (OUTPATIENT)
Dept: ANTICOAGULATION | Facility: CLINIC | Age: 64
End: 2024-08-28

## 2024-08-28 VITALS
HEIGHT: 67 IN | HEART RATE: 77 BPM | SYSTOLIC BLOOD PRESSURE: 110 MMHG | DIASTOLIC BLOOD PRESSURE: 70 MMHG | TEMPERATURE: 98.1 F | BODY MASS INDEX: 45.99 KG/M2 | RESPIRATION RATE: 16 BRPM | OXYGEN SATURATION: 97 % | WEIGHT: 293 LBS

## 2024-08-28 DIAGNOSIS — Z79.01 LONG TERM CURRENT USE OF ANTICOAGULANT THERAPY: Primary | ICD-10-CM

## 2024-08-28 DIAGNOSIS — Z79.01 LONG TERM (CURRENT) USE OF ANTICOAGULANTS: ICD-10-CM

## 2024-08-28 DIAGNOSIS — I26.99 PULMONARY EMBOLISM WITH INFARCTION (H): Primary | ICD-10-CM

## 2024-08-28 DIAGNOSIS — M47.812 ARTHROPATHY OF CERVICAL FACET JOINT: ICD-10-CM

## 2024-08-28 DIAGNOSIS — Z86.711 HISTORY OF PULMONARY EMBOLISM: ICD-10-CM

## 2024-08-28 LAB — INR POINT OF CARE: 2.9 (ref 0.9–1.1)

## 2024-08-28 PROCEDURE — 64491 INJ PARAVERT F JNT C/T 2 LEV: CPT | Mod: RT | Performed by: STUDENT IN AN ORGANIZED HEALTH CARE EDUCATION/TRAINING PROGRAM

## 2024-08-28 PROCEDURE — 64490 INJ PARAVERT F JNT C/T 1 LEV: CPT | Mod: RT | Performed by: STUDENT IN AN ORGANIZED HEALTH CARE EDUCATION/TRAINING PROGRAM

## 2024-08-28 PROCEDURE — 85610 PROTHROMBIN TIME: CPT | Performed by: STUDENT IN AN ORGANIZED HEALTH CARE EDUCATION/TRAINING PROGRAM

## 2024-08-28 PROCEDURE — 36416 COLLJ CAPILLARY BLOOD SPEC: CPT | Performed by: STUDENT IN AN ORGANIZED HEALTH CARE EDUCATION/TRAINING PROGRAM

## 2024-08-28 RX ORDER — LIDOCAINE HYDROCHLORIDE 10 MG/ML
INJECTION, SOLUTION EPIDURAL; INFILTRATION; INTRACAUDAL; PERINEURAL
Status: COMPLETED | OUTPATIENT
Start: 2024-08-28 | End: 2024-08-28

## 2024-08-28 RX ORDER — BUPIVACAINE HYDROCHLORIDE 5 MG/ML
INJECTION, SOLUTION EPIDURAL; INTRACAUDAL
Status: COMPLETED | OUTPATIENT
Start: 2024-08-28 | End: 2024-08-28

## 2024-08-28 RX ADMIN — BUPIVACAINE HYDROCHLORIDE 0.9 ML: 5 INJECTION, SOLUTION EPIDURAL; INTRACAUDAL at 11:25

## 2024-08-28 RX ADMIN — LIDOCAINE HYDROCHLORIDE 3 ML: 10 INJECTION, SOLUTION EPIDURAL; INFILTRATION; INTRACAUDAL; PERINEURAL at 11:24

## 2024-08-28 ASSESSMENT — PAIN SCALES - GENERAL
PAINLEVEL: MILD PAIN (2)
PAINLEVEL: MODERATE PAIN (4)

## 2024-08-28 NOTE — PATIENT INSTRUCTIONS

## 2024-08-28 NOTE — TELEPHONE ENCOUNTER
Home care nurse Katelyn calling from [facility] to inquire if patient needs an INR checked today because the patient is having a CANDY injection today.  Katelyn does not know if the patient has been holding warfarin but does recall the patient holding previously for this same procedure.    Per TE from 8/21/24 with home care nurse Terry, home care was supposed to see patient and find out more information regarding this procedure but per chart review there does not seem to be any follow up.    Katelyn will call patient to find out what she has been doing for warfarin dosing and call [facility] back with this information.    CHRIS CespedesN, RN  Anticoagulation Clinic

## 2024-08-28 NOTE — PROGRESS NOTES
"Plan on 08/26/2024:  Decrease lyrica to 25mg BID - If still experiencing dizziness then discontinue.    ANTICOAGULATION MANAGEMENT     Valentina Olmedo 64 year old female is on warfarin with therapeutic INR result. (Goal INR 2.0-3.0)    Recent labs: (last 7 days)     08/28/24  1110   INR 2.9       ASSESSMENT     Source(s): Chart Review and Home Care/Facility Nurse     Warfarin doses taken: Warfarin taken as instructed  Diet: No new diet changes identified  Medication/supplement changes:  8/26: decreased lyrica to 25mg BID - If still experiencing dizziness then discontinue.per office note    New illness, injury, or hospitalization: No  Signs or symptoms of bleeding or clotting: No  Previous result: Therapeutic last 2(+) visits  Additional findings:  Medial Branch Block (MBB) TEST done today \"TEST injection to find out which nerves are causing your pain.  These diagnostic injections will NOT provide any long-term pain relief because they are a TEST.  Steroid is NOT used for this injection.  Steroid is used to provide long-lasting pain relief.  Since there is no steroid used, there will not be any long-lasting pain relief.\" No warfarin hold for test injection.        PLAN     Recommended plan for temporary change(s) and ongoing change(s) affecting INR     Dosing Instructions: Continue your current warfarin dose with next INR in 1 week       Summary  As of 8/28/2024      Full warfarin instructions:  7.5 mg every Mon, Fri; 10 mg all other days   Next INR check:  9/11/2024               Telephone call with Katelyn home care nurse who agrees to plan and repeated back plan correctly    Orders given to  Homecare nurse/facility to recheck    Education provided: Please call back if any changes to your diet, medications or how you've been taking warfarin    Plan made per [facility] anticoagulation protocol    Melissa Montenegro, RN  Anticoagulation Clinic  8/28/2024    _______________________________________________________________________ "     Anticoagulation Episode Summary       Current INR goal:  2.0-3.0   TTR:  53.4% (1 y)   Target end date:  Indefinite   Send INR reminders to:  [facility]    Indications    History of pulmonary embolism (Resolved) [Z86.711]  Pulmonary embolism with infarction (H) [I26.99]  Long term (current) use of anticoagulants [Z79.01]  History of pulmonary embolism [Z86.711]             Comments:               Anticoagulation Care Providers       Provider Role Specialty Phone number    Promise Vanegas MD Referring Family Medicine [phone]    Donald Rooney MD Referring Internal Medicine [phone]    Jose Maria Morin MD Referring Family Medicine [phone]

## 2024-08-29 ENCOUNTER — HOSPITAL ENCOUNTER (OUTPATIENT)
Dept: CARDIAC REHAB | Facility: HOSPITAL | Age: 64
Discharge: HOME | End: 2024-08-29
Attending: INTERNAL MEDICINE
Payer: COMMERCIAL

## 2024-08-29 ENCOUNTER — MEDICAL CORRESPONDENCE (OUTPATIENT)
Dept: HEALTH INFORMATION MANAGEMENT | Facility: CLINIC | Age: 64
End: 2024-08-29
Payer: COMMERCIAL

## 2024-08-29 PROCEDURE — G0239 OTH RESP PROC, GROUP: HCPCS

## 2024-08-30 DIAGNOSIS — Z76.0 ENCOUNTER FOR MEDICATION REFILL: ICD-10-CM

## 2024-08-30 DIAGNOSIS — K59.09 CHRONIC CONSTIPATION: ICD-10-CM

## 2024-08-30 DIAGNOSIS — F43.10 PTSD (POST-TRAUMATIC STRESS DISORDER): ICD-10-CM

## 2024-08-30 DIAGNOSIS — F17.210 CIGARETTE NICOTINE DEPENDENCE: ICD-10-CM

## 2024-08-30 RX ORDER — PRAZOSIN HYDROCHLORIDE 2 MG/1
2 CAPSULE ORAL AT BEDTIME
Qty: 90 CAPSULE | Refills: 0 | Status: SHIPPED | OUTPATIENT
Start: 2024-08-30 | End: 2024-09-17

## 2024-08-30 RX ORDER — VARENICLINE TARTRATE 1 MG/1
1 TABLET, FILM COATED ORAL 2 TIMES DAILY
Qty: 56 TABLET | Refills: 0 | Status: SHIPPED | OUTPATIENT
Start: 2024-08-30 | End: 2024-09-27

## 2024-08-30 RX ORDER — SENNOSIDES 8.6 MG/1
TABLET, COATED ORAL
Qty: 120 TABLET | Refills: 11 | Status: SHIPPED | OUTPATIENT
Start: 2024-08-30

## 2024-08-30 NOTE — TELEPHONE ENCOUNTER
[place]ed [place](s) [place]    [facility] [place] [address], [address]    Date of Last Office Visit: 2024  Date of Next Office Visit:  2024  No shows since last visit: No  More than one patient-initiated cancellation (with reschedule) since last seen in clinic? No    []Medication refilled per  Medication Refill in Ambulatory Care  policy.  [x]Medication unable to be refilled by RN due to criteria not met as indicated below:    []Eligibility: has not had a provider visit within last 6 months   []Supervision: no future appointment; < 7 days before next appointment   []Compliance: no shows; cancellations; lapse in therapy   []Verification: order discrepancy; may need modification...   [x] > 30-day supply request   [x]Advanced refill request: > 7 days before refill date   []Controlled medication   []Medication not included in policy   []Review: new med; med adjusted ? 30 days; safety alert; requires lab monitoring...   []Scope of Practice: refill request processed by LPN/MA   []Other:      Medication(s) requested:   -  prazosin (MINIPRESS) 2 MG capsule   Date last ordered: 3/29/2024  Qty: 90  Refills: 1  Take 1 capsule (2 mg) by mouth at bedtime     Appropriate for refill? Provider to review.        Any Controlled Substance(s)? No      Requested medication(s) verified as identical to current order? Yes    Any lapse in adherence to medication(s) greater than 5 days? No      Additional action taken? routed encounter to provider for review.      Last visit treatment plan:   Medical Decision Makin. Schizoaffective disorder, depressive type (H)  Pt reports worsening depressive symptoms (low energy, poor motivation).  Increase Abilify 15mg q am.  Pt will call in 3-4 weeks if no improvement (will increase to 20 mg).  Seroquel 25-50 mg daily prn agitation  Cymbalta 120 mg daily.  melatonin 3 mg nightly.  Prazosin 2 mg nightly     Referred to therapy at transition  "clinic.     2. Borderline personality disorder  At Dec visit, I called S to refer DBT therapy. [facility] said because pt had past treatment at [facility], they needed to check on eligibility and would call her back.    Pt said \"Never called me\".    Unsigned DA FCC by Conchita Smith, [facility], 3/19/24.  Plan: Referred to therapy at transition clinic.        3. High risk medication use  Risks and benefits of medication discussed (ie metabolic s/e's, TD, CV risk). No involuntary movements noted on MSE.    DISCUS score = 0 (4/25/23).  Metabolic conditions treated by PCP.    Labs reviewed: CBC, CMP, lipids (5/30/24).  Lipids, A1c, (1/30/24).        4.  Nicotine use disorder   Brief intervention done.  Pulmonologist Shawnee Balderas, Sen Milner MD prescribed Chantix.  Says it helps. History of quitting x 1 yr and 3 mos.     5. Epilepsy  Managed by Vikki Benitez MD, [facility]. Last appt in June.        Next appt 2-3 mos.  Pt will call in 3-4 weeks if no improvement (will increase abilify to 20 mg).    Any medication(s) require lab monitoring? No                "

## 2024-09-04 ENCOUNTER — TELEPHONE (OUTPATIENT)
Dept: PHYSICAL MEDICINE AND REHAB | Facility: CLINIC | Age: 64
End: 2024-09-04

## 2024-09-04 ENCOUNTER — ANTICOAGULATION THERAPY VISIT (OUTPATIENT)
Dept: ANTICOAGULATION | Facility: CLINIC | Age: 64
End: 2024-09-04

## 2024-09-04 ENCOUNTER — OFFICE VISIT (OUTPATIENT)
Dept: PHYSICAL MEDICINE AND REHAB | Facility: CLINIC | Age: 64
End: 2024-09-04
Attending: PHYSICIAN ASSISTANT
Payer: COMMERCIAL

## 2024-09-04 DIAGNOSIS — Z79.01 LONG TERM (CURRENT) USE OF ANTICOAGULANTS: ICD-10-CM

## 2024-09-04 DIAGNOSIS — R20.2 PARESTHESIA: ICD-10-CM

## 2024-09-04 DIAGNOSIS — Z86.711 HISTORY OF PULMONARY EMBOLISM: ICD-10-CM

## 2024-09-04 DIAGNOSIS — I26.99 PULMONARY EMBOLISM WITH INFARCTION (H): Primary | ICD-10-CM

## 2024-09-04 LAB — INR (EXTERNAL): 2.5

## 2024-09-04 PROCEDURE — 95886 MUSC TEST DONE W/N TEST COMP: CPT | Performed by: PHYSICAL MEDICINE & REHABILITATION

## 2024-09-04 PROCEDURE — 95911 NRV CNDJ TEST 9-10 STUDIES: CPT | Performed by: PHYSICAL MEDICINE & REHABILITATION

## 2024-09-04 NOTE — PATIENT INSTRUCTIONS
Thank you for choosing the [facility] for your EMG testing.    The ordering provider will receive your final EMG results within the next few days.  Please follow up with your provider for the results and further treatment recommendations.

## 2024-09-04 NOTE — TELEPHONE ENCOUNTER
Called patient to discuss. Left voicemail for patient requesting a call back to discuss. Also told her that I will send a [employer] message with information as well.    ----- Message from Lou Swanson sent at 9/3/2024  3:14 PM CDT -----  Regarding: RE: MBB/RFA Process  Failed MBBs.  I am happy to see the patient back in the clinic if she wants.  The patient and I also discussed getting a second surgical opinion so if she would prefer to do that I can put in a referral.    ----- Message -----  From: Breyer, Nathan J, RN  Sent: 9/3/2024   2:23 PM CDT  To: Lou Swanson PA-C; #  Subject: FW: MBB/RFA Process                              Pain diary received and scanned into chart.  Please review and forward recommendation to Procedure Support Pool in-basket.  Thanks!    ----- Message -----  From: Cristine Clemente RN  Sent: 8/28/2024  11:13 AM CDT  To: [facility] Procedure Support Pool  Subject: MBB/RFA Process                                  Pt was in today, 8-28, for right C2, C3, C4 MBBs as ordered by Lou and performed by Dr. Levy. Awaiting return of pain diary at this time.

## 2024-09-04 NOTE — PROGRESS NOTES
ANTICOAGULATION MANAGEMENT     Valentina Olmedo 64 year old female is on warfarin with therapeutic INR result. (Goal INR 2.0-3.0)    Recent labs: (last 7 days)     09/04/24  0000   INR 2.5       ASSESSMENT     Source(s): Chart Review and Patient/Caregiver Call     Warfarin doses taken: Warfarin taken as instructed  Diet: No new diet changes identified  Medication/supplement changes: None noted  New illness, injury, or hospitalization: No  Signs or symptoms of bleeding or clotting: No  Previous result: Therapeutic last 2(+) visits  Additional findings: Much less dizziness.       PLAN     Recommended plan for no diet, medication or health factor changes affecting INR     Dosing Instructions: Continue your current warfarin dose with next INR in 2 weeks       Summary  As of 9/4/2024      Full warfarin instructions:  7.5 mg every Mon, Fri; 10 mg all other days   Next INR check:  9/18/2024               Telephone call with [place] home care nurse who verbalizes understanding and agrees to plan and who agrees to plan and repeated back plan correctly    Orders given to  Homecare nurse/facility to recheck    Education provided: Please call back if any changes to your diet, medications or how you've been taking warfarin  Goal range and lab monitoring: goal range and significance of current result  Contact [phone] with any changes, questions or concerns.     Plan made per [facility] anticoagulation protocol    Rosavla Eckert RN  Anticoagulation Clinic  9/4/2024    _______________________________________________________________________     Anticoagulation Episode Summary       Current INR goal:  2.0-3.0   TTR:  55.2% (1 y)   Target end date:  Indefinite   Send INR reminders to:  [facility]    Indications    History of pulmonary embolism (Resolved) [Z86.711]  Pulmonary embolism with infarction (H) [I26.99]  Long term (current) use of anticoagulants [Z79.01]  History of pulmonary embolism [Z86.711]             Comments:                Anticoagulation Care Providers       Provider Role Specialty Phone number    Promise Vanegas MD Referring Family Medicine [phone]    Donald Rooney MD Referring Internal Medicine [phone]    Jose Maria Morin MD Referring Family Medicine [phone]

## 2024-09-04 NOTE — TELEPHONE ENCOUNTER
Patient returned call. Explained reason for our earlier call. She does state she did not have relief with the last injections and would like for the left side to be injected. She does have a scheduled follow up with [facility] for next week. Explained she can discuss response to last injections as well as possible further injection to left at that time. Also the EMG results will be discussed then as well as next steps in care.

## 2024-09-04 NOTE — LETTER
9/4/2024      Valentina Olmedo  [address]  [place] [address]      Dear Colleague,    Thank you for referring your patient, Valentina Olmedo, to the [facility]. Please see a copy of my visit note below.    [facility]  [address] [place] [address] [address]  [place] [address]  Office: [phone] Fax: [phone]    Electromyography and Nerve Conduction Study Report        Indication: Patient presents with request of Lou Swanson for a bilateral lower extremity EMG.  She has numbness and tingling in bilateral feet to the knee on the left and to the lower thigh on the right.  History of a hematoma in the right lateral calf.  On exam, decree sensation to light touch from the feet up to the knees bilaterally and slightly more cephalad on the right.  1+ patellar 0 Achilles reflexes bilaterally, normal muscle strength throughout the major muscle groups of the bilateral lower extremities.  Enlargement of the right lateral calf slightly firm consistent with history of hematoma.      Pt Exam Discussion (Communication Barriers):  Electromyography and nerve conduction testing, including associated discomfort, risks, benefits, and alternatives was discussed with the patient prior to the procedure.  No learning/ communication barriers; patient verbalized understanding of procedure.  Informed consent was obtained.           Pt Assessment:  Testing was successfully completed; patient tolerated testing well.       Blood Thinners: Warfarin   Skin Temperature: Warmed 33.6                   EMG/NCS  results:     Nerve Conduction Studies  Motor Sites      Segment Distal Latency Neg. Amp CV F-Latency F-Estimate Comment   Site  (ms) (mV) (m/s) (ms) (ms)    Left Fibular (EDB) Motor   Ankle Ankle-EDB 4.5 2.9       Knee Knee-Ankle 13.8 3.2 42      Right Fibular (EDB) Motor   Ankle Ankle-EDB 4.2 2.4       Knee Knee-Ankle 12.6 2.3 49      Left Tibial (AH) Motor   Ankle  Ankle-AH 4.2 6.8       Knee Knee-Ankle 13.2 *0.63 48      Right Tibial (AH) Motor   Ankle Ankle-AH 3.2 6.0  52.6 -    Knee Knee-Ankle *NR *NR *NR      Right Ulnar (ADM) Motor   Wrist  2.5 12.4  28.9 -    Bel Elbow Bel Elbow-Wrist 6.4 11.8 58      Ab Elbow Ab Elbow-Wrist 7.9 11.4 61        Sensory Sites      Onset Lat Peak Lat Amp CV Comment   Site (ms) (ms) ( V) (m/s)    Right Radial Sensory   Forearm-Wrist 1.30 1.83 *17 77    Left Sural Sensory   B-Ankle *NR *NR *NR *NR    Right Sural Sensory   B-Ankle *NR *NR *NR *NR        NCS Waveforms:    Motor                  Sensory           F-Wave           Electromyography     Side Muscle Nerve Root Ins Act Fibs Psw Fasc Recrt Dur Amp Poly Comment   Right AntTibialis Dp Br Fibular L4-5 Nml Nml Nml Nml Nml Nml Nml 0    Right Gastroc Tibial S1-2 Nml Nml Nml Nml      poor activation   Right VastusLat Femoral L2-4 Nml Nml Nml Nml Nml Nml Nml 0    Right ExtHallLong Dp Br Fibular L5, S1 Nml Nml Nml Nml Nml Nml Nml 0    Right AbdHallucis MedPlantar S1-2 Nml Nml Nml Nml Nml Nml Nml 0    Left AntTibialis Dp Br Fibular L4-5 Nml Nml Nml Nml Nml Nml Nml 0    Left Gastroc Tibial S1-2 Nml Nml Nml Nml      poor activation   Left Fibularis Long Sup Br Fibular L5-S1 Nml Nml Nml Nml Nml Nml Nml 0    Left VastusLat Femoral L2-4 Nml Nml Nml Nml Nml Nml Nml 0    Left AbdHallucis MedPlantar S1-2 Nml Nml Nml Nml Nml Nml Nml 0        Comment NCS: Abnormal study  1.  Absent bilateral sural SNAPs  2.  Normal bilateral peroneal CMAP's to the EDB  3.  Normal distal latencies and amplitudes of the bilateral tibial CMAP's with significantly low or absent tibial CMAP's with stimulation at the knee.  Likely secondary to body habitus.  Cannot exclude tibial neuropathy across the calf/knee.  4.  Low amplitude right radial SNAP  5.  Normal right ulnar CMAP    Comment EMG: Normal study  1.  Normal needle EMG bilateral lower extremities.  Right peroneus longus was not tested secondary to likely hematoma over  the lateral calf.    Interpretation: Abnormal study: There is electrodiagnostic evidence of:    1.  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  At this time appears to be affecting SNAPs of the lower greater than upper extremities with no effect on the motor nerve conduction studies.      2.  There is amplitude drop of the bilateral tibial motor nerve conduction studies across the calf.  This is likely technical secondary to body habitus, but I cannot completely exclude tibial neuropathy through the calf or knee.      3.  There is no electrodiagnostic evidence of lumbosacral radiculopathy or peroneal neuropathy in the bilateral lower extremities.     The testing was completed in its entirety by the physician.      It was our pleasure caring for your patient today, if there any questions or concerns please do not hesitate to contact us.      Again, thank you for allowing me to participate in the care of your patient.        Sincerely,        Noman Yo, DO

## 2024-09-04 NOTE — PROGRESS NOTES
[facility]  [address] [place] [address] [address]  [place] [address]  Office: [phone] Fax: [phone]    Electromyography and Nerve Conduction Study Report        Indication: Patient presents with request of Lou Swanson for a bilateral lower extremity EMG.  She has numbness and tingling in bilateral feet to the knee on the left and to the lower thigh on the right.  History of a hematoma in the right lateral calf.  On exam, decree sensation to light touch from the feet up to the knees bilaterally and slightly more cephalad on the right.  1+ patellar 0 Achilles reflexes bilaterally, normal muscle strength throughout the major muscle groups of the bilateral lower extremities.  Enlargement of the right lateral calf slightly firm consistent with history of hematoma.      Pt Exam Discussion (Communication Barriers):  Electromyography and nerve conduction testing, including associated discomfort, risks, benefits, and alternatives was discussed with the patient prior to the procedure.  No learning/ communication barriers; patient verbalized understanding of procedure.  Informed consent was obtained.           Pt Assessment:  Testing was successfully completed; patient tolerated testing well.       Blood Thinners: Warfarin   Skin Temperature: Warmed 33.6                   EMG/NCS  results:     Nerve Conduction Studies  Motor Sites      Segment Distal Latency Neg. Amp CV F-Latency F-Estimate Comment   Site  (ms) (mV) (m/s) (ms) (ms)    Left Fibular (EDB) Motor   Ankle Ankle-EDB 4.5 2.9       Knee Knee-Ankle 13.8 3.2 42      Right Fibular (EDB) Motor   Ankle Ankle-EDB 4.2 2.4       Knee Knee-Ankle 12.6 2.3 49      Left Tibial (AH) Motor   Ankle Ankle-AH 4.2 6.8       Knee Knee-Ankle 13.2 *0.63 48      Right Tibial (AH) Motor   Ankle Ankle-AH 3.2 6.0  52.6 -    Knee Knee-Ankle *NR *NR *NR      Right Ulnar (ADM) Motor   Wrist  2.5 12.4  28.9 -    Bel Elbow Bel Elbow-Wrist 6.4 11.8 58      Ab Elbow  Ab Elbow-Wrist 7.9 11.4 61        Sensory Sites      Onset Lat Peak Lat Amp CV Comment   Site (ms) (ms) ( V) (m/s)    Right Radial Sensory   Forearm-Wrist 1.30 1.83 *17 77    Left Sural Sensory   B-Ankle *NR *NR *NR *NR    Right Sural Sensory   B-Ankle *NR *NR *NR *NR        NCS Waveforms:    Motor                  Sensory           F-Wave           Electromyography     Side Muscle Nerve Root Ins Act Fibs Psw Fasc Recrt Dur Amp Poly Comment   Right AntTibialis Dp Br Fibular L4-5 Nml Nml Nml Nml Nml Nml Nml 0    Right Gastroc Tibial S1-2 Nml Nml Nml Nml      poor activation   Right VastusLat Femoral L2-4 Nml Nml Nml Nml Nml Nml Nml 0    Right ExtHallLong Dp Br Fibular L5, S1 Nml Nml Nml Nml Nml Nml Nml 0    Right AbdHallucis MedPlantar S1-2 Nml Nml Nml Nml Nml Nml Nml 0    Left AntTibialis Dp Br Fibular L4-5 Nml Nml Nml Nml Nml Nml Nml 0    Left Gastroc Tibial S1-2 Nml Nml Nml Nml      poor activation   Left Fibularis Long Sup Br Fibular L5-S1 Nml Nml Nml Nml Nml Nml Nml 0    Left VastusLat Femoral L2-4 Nml Nml Nml Nml Nml Nml Nml 0    Left AbdHallucis MedPlantar S1-2 Nml Nml Nml Nml Nml Nml Nml 0        Comment NCS: Abnormal study  1.  Absent bilateral sural SNAPs  2.  Normal bilateral peroneal CMAP's to the EDB  3.  Normal distal latencies and amplitudes of the bilateral tibial CMAP's with significantly low or absent tibial CMAP's with stimulation at the knee.  Likely secondary to body habitus.  Cannot exclude tibial neuropathy across the calf/knee.  4.  Low amplitude right radial SNAP  5.  Normal right ulnar CMAP    Comment EMG: Normal study  1.  Normal needle EMG bilateral lower extremities.  Right peroneus longus was not tested secondary to likely hematoma over the lateral calf.    Interpretation: Abnormal study: There is electrodiagnostic evidence of:    1.  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  At this time appears to be affecting  SNAPs of the lower greater than upper extremities with no effect on the motor nerve conduction studies.      2.  There is amplitude drop of the bilateral tibial motor nerve conduction studies across the calf.  This is likely technical secondary to body habitus, but I cannot completely exclude tibial neuropathy through the calf or knee.      3.  There is no electrodiagnostic evidence of lumbosacral radiculopathy or peroneal neuropathy in the bilateral lower extremities.     The testing was completed in its entirety by the physician.      It was our pleasure caring for your patient today, if there any questions or concerns please do not hesitate to contact us.

## 2024-09-05 ENCOUNTER — TELEPHONE (OUTPATIENT)
Dept: PHYSICAL MEDICINE AND REHAB | Facility: CLINIC | Age: 64
End: 2024-09-05
Payer: COMMERCIAL

## 2024-09-05 NOTE — TELEPHONE ENCOUNTER
----- Message from Lou Swanson sent at 9/5/2024  7:34 AM CDT -----  Regarding: EMG results  Please call this patient and let her know that her EMG showed that she has peripheral neuropathy (damage to the small nerve endings). There is no evidence of nerve injury from the spine.  We can discuss at her follow up visit next week.

## 2024-09-06 ENCOUNTER — MEDICAL CORRESPONDENCE (OUTPATIENT)
Dept: HEALTH INFORMATION MANAGEMENT | Facility: CLINIC | Age: 64
End: 2024-09-06
Payer: COMMERCIAL

## 2024-09-09 NOTE — PROGRESS NOTES
Assessment:   Valentina Olmedo is a 64 y.o. female with past medical history significant for epilepsy, migraine, polyneuropathy, obstructive sleep apnea, COPD, history of PE, obesity, hyperlipidemia, hypothyroidism, hypertension, osteoporosis (on Prolia) , on warfarin, chronic kidney disease stage III, tobacco abuse, schizoaffective disorder, depression, PTSD, history of thyroid cancer, history of melanoma who presents today for follow-up regarding 2 concerns:  1.  Chronic neck pain with radiation into the right upper extremity with associated numbness, tingling, weakness.  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  There is multilevel spondylosis with multilevel foraminal stenosis.  There is no high-grade spinal canal stenosis.  A C7-T1 interlaminar epidural steroid injection in April provided 80% relief of arm pain for over 4 months.  Over the past several weeks, right arm pain has returned.  - Patient failed right C2, C3, C4 medial branch blocks August 20, 2024.  -Patient saw Dr. Jacobsen July 6, 2023 regarding her cervical spine findings.  She recommended weight loss prior to consideration of elective spinal surgery with a goal BMI of less than 40.  2.  Painful paresthesias in the bilateral lower extremities.  EMG showed sensorimotor polyneuropathy.           Plan:     A shared decision making plan was used.  The patient's values and choices were respected.  The following represents what was discussed and decided upon by the physician assistant and the patient.      1.  DIAGNOSTIC TESTS:  - I reviewed the CT t cervical spine.  - Reviewed the MRI lumbar spine from June 2023.  - Reviewed the EMG bilateral lower extremities.      2.  PHYSICAL THERAPY:  - Entered a referral for the patient to physical therapy.  She would like to go to [facility] in [place].  She would like to try land therapy first but is also open to pool therapy which I noted on her  order.    3.  MEDICATIONS: No changes are made to the patient's medications.  Patient sees the pain clinic.  - Patient can continue Tylenol as needed.  - Patient takes pregabalin 25 mg 2 times daily.  - Patient takes duloxetine 120 mg daily for mood.  - Patient cannot take NSAIDs since she is on warfarin.    4.  INTERVENTIONS:  - Offer the patient repeat C7-T1 interlaminar epidural steroid injection.  Patient would like to proceed.  She is on warfarin.  We will get medical clearance for her to hold her warfarin before her injection.      5.  PATIENT EDUCATION: Patient is in agreement the above plan.  All questions were answered.      6.  FOLLOW-UP: My nurse will call the patient to schedule her C7-T1 interlaminar epidural steroid injection after we get medical clearance to hold her warfarin.  If she has questions or concerns in the meantime, she should not hesitate to call.    Subjective:     Valentina Olmedo is a 64 year old female who presents today for follow-up regarding chronic neck pain.  Patient is following up after she failed to have significant relief with right C2, C3, C4 medial branch blocks August 20, 2024.  Patient reports that since she was last seen her right arm pain has returned.  She had previously had 80% relief of right arm pain following a C7-T1 interlaminar epidural steroid injection April 8, 2024.  The right arm pain returned after her medial branch blocks last month.      Patient complains of Right-sided neck pain.  Pain radiates into the right shoulder, on the right lateral upper arm, into the right extensor forearm into the right dorsal hand.  She has chronic numbness and tingling in all her fingers of both hands.  She states her arms feel weak.    Patient also continues to complain of numbness and tingling in bilateral lower extremities.  On the right it starts in her feet and radiates up to her thigh.  On the left it radiates up to her knee.    Overall, she rates her pain today as a 6 out  of 10.  She denies any aggravating or alleviating factors to the pain.      Treatment to date:  - Physical therapy times 1 April 2024.  - Physical therapy off-and-on for many years.  She went to 1 session of physical therapy for chronic pain April 2, 2022.  - Outpatient physical therapy was August 2022.  - She has had home care PT and OT 2023.    - Chiropractic treatment was helpful in the past,  - Right C2, C3, C4 medial branch blocks August 28, 2024-failed  - C7-T1 interlaminar epidural steroid injection April 8, 2024 with 80% relief for over 4 months  - C7-T1 interlaminar epidural steroid injection November 9, 2023 with 70% relief of pain lasting about 3 months  - No spine surgeries  - Tylenol   - Patient takes duloxetine 120 mg daily for mood  - Tizanidine 4 mg 3 times daily  -Gabapentin caused a rash  - Pregabalin  - Prednisone    Review of systems:  Positive for numbness/tingling, weakness, headache, pain much worse at night, trip/tunnel/falls, difficulty with hand skills.  Negative for loss of bowel/bladder control, inability to urinate, difficulty swallowing, fevers, unintentional weight loss.  Objective:   CONSTITUTIONAL:  Vital signs as above.  No acute distress.  The patient is well nourished and well groomed.  Patient is observed sitting in a wheelchair.  She is morbidly obese.  PSYCHIATRIC:  The patient is awake, alert, oriented to person, place and time.  The patient is answering questions appropriately with clear speech.  Normal affect.  HEENT: Normocephalic, atraumatic.  Sclera clear.    SKIN: Exposed skin is clean, dry, intact without rashes.  MUSCULOSKELETAL: Patient is observed sitting in wheelchair.   The patient has  5/5 strength for the bilateral shoulder abductors, elbow flexors/extensors, wrist extensors, finger flexors/abductors.  Tender to palpation right cervical paraspinous muscles and right upper trapezius muscle with hypertonicity.  NEUROLOGICAL:   Negative Greardo sign bilaterally.   Subjective sensory deficit fingers of bilateral hands and bilateral lower extremity stocking distribution.     RESULTS:   I reviewed the CT cervical spine from [facility] dated July 24, 2024.  At C3-4 there is mild thickening and ossification of the ligamentum flavum.  This, in combination with a broad-based right paracentral disc osteophyte complex contributes to bilateral foraminal stenosis, more severe on the right.  At C4-5 there is ossification of the ligamentum flavum with subtle flattening of the posterior dorsal subarachnoid space.  There is a broad-based disc osteophyte and facet hypertrophic change with bilateral foraminal stenosis.  At C5-6 there is subtle flattening of the left aspect of the ventral cord related to a disc osteophyte.  There is also severe bilateral foraminal stenosis.  At C6-7 there is bilateral foraminal stenosis.  At C7-T1 there is bilateral foraminal stenosis.    I reviewed the MRI cervical spine no contrast from [facility] dated July 5, 2023.  The possible densely mineralized meningioma at C4-5 is not visualized on the MRI.  There is multilevel cervical spondylosis.  At C2-3 there is moderate right foraminal stenosis.  At C3-4 there is mild spinal canal stenosis with moderate left and severe right foraminal stenosis.  At C4-5 there is moderate spinal canal stenosis with moderate right and severe left foraminal stenosis.  At C5-6 there is moderate to severe spinal canal stenosis with severe left and moderate right foraminal stenosis.  At C6-7 there is moderate spinal canal stenosis with severe left and moderate right foraminal stenosis.    I reviewed the MRI lumbar spine from [facility] dated June 30, 2023.  This shows chronic compression deformities at L1, L2, and L5.  There is epidural lipomatosis and multilevel spondylosis.  At L5-S1 there is mild to moderate thecal sac stenosis, mild left and mild to moderate right foraminal stenosis.  At L4-5 there is moderate to  severe thecal sac stenosis and mild bilateral foraminal stenosis.    EMG of bilateral lower extremities September 4, 2024:  Interpretation: Abnormal study: There is electrodiagnostic evidence of:     1.  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  At this time appears to be affecting SNAPs of the lower greater than upper extremities with no effect on the motor nerve conduction studies.       2.  There is amplitude drop of the bilateral tibial motor nerve conduction studies across the calf.  This is likely technical secondary to body habitus, but I cannot completely exclude tibial neuropathy through the calf or knee.       3.  There is no electrodiagnostic evidence of lumbosacral radiculopathy or peroneal neuropathy in the bilateral lower extremities.

## 2024-09-10 ENCOUNTER — HOSPITAL ENCOUNTER (OUTPATIENT)
Dept: CARDIAC REHAB | Facility: HOSPITAL | Age: 64
Discharge: HOME | End: 2024-09-10
Attending: INTERNAL MEDICINE
Payer: COMMERCIAL

## 2024-09-10 PROCEDURE — G0239 OTH RESP PROC, GROUP: HCPCS

## 2024-09-12 ENCOUNTER — OFFICE VISIT (OUTPATIENT)
Dept: PHYSICAL MEDICINE AND REHAB | Facility: CLINIC | Age: 64
End: 2024-09-12
Payer: COMMERCIAL

## 2024-09-12 ENCOUNTER — TELEPHONE (OUTPATIENT)
Dept: PHYSICAL MEDICINE AND REHAB | Facility: CLINIC | Age: 64
End: 2024-09-12

## 2024-09-12 VITALS
BODY MASS INDEX: 45.99 KG/M2 | WEIGHT: 293 LBS | DIASTOLIC BLOOD PRESSURE: 70 MMHG | HEART RATE: 78 BPM | SYSTOLIC BLOOD PRESSURE: 146 MMHG | HEIGHT: 67 IN

## 2024-09-12 DIAGNOSIS — M54.12 CERVICAL RADICULAR PAIN: Primary | ICD-10-CM

## 2024-09-12 DIAGNOSIS — G62.9 PERIPHERAL POLYNEUROPATHY: ICD-10-CM

## 2024-09-12 DIAGNOSIS — M54.50 LUMBAR SPINE PAIN: ICD-10-CM

## 2024-09-12 PROCEDURE — 99214 OFFICE O/P EST MOD 30 MIN: CPT | Performed by: PHYSICIAN ASSISTANT

## 2024-09-12 ASSESSMENT — PAIN SCALES - GENERAL: PAINLEVEL: SEVERE PAIN (6)

## 2024-09-12 NOTE — LETTER
9/12/2024      Valentina Olmedo  [address]  [place] [place] [place] [address]      Dear Colleague,    Thank you for referring your patient, Valentina Olmedo, to the [facility]. Please see a copy of my visit note below.    Assessment:   Valentina Olmedo is a 64 y.o. female with past medical history significant for epilepsy, migraine, polyneuropathy, obstructive sleep apnea, COPD, history of PE, obesity, hyperlipidemia, hypothyroidism, hypertension, osteoporosis (on Prolia) , on warfarin, chronic kidney disease stage III, tobacco abuse, schizoaffective disorder, depression, PTSD, history of thyroid cancer, history of melanoma who presents today for follow-up regarding 2 concerns:  1.  Chronic neck pain with radiation into the right upper extremity with associated numbness, tingling, weakness.  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  There is multilevel spondylosis with multilevel foraminal stenosis.  There is no high-grade spinal canal stenosis.  A C7-T1 interlaminar epidural steroid injection in April provided 80% relief of arm pain for over 4 months.  Over the past several weeks, right arm pain has returned.  - Patient failed right C2, C3, C4 medial branch blocks August 20, 2024.  -Patient saw Dr. Jacobsen July 6, 2023 regarding her cervical spine findings.  She recommended weight loss prior to consideration of elective spinal surgery with a goal BMI of less than 40.  2.  Painful paresthesias in the bilateral lower extremities.  EMG showed sensorimotor polyneuropathy.           Plan:     A shared decision making plan was used.  The patient's values and choices were respected.  The following represents what was discussed and decided upon by the physician assistant and the patient.      1.  DIAGNOSTIC TESTS:  - I reviewed the CT t cervical spine.  - Reviewed the MRI lumbar spine from June 2023.  - Reviewed the EMG bilateral lower  extremities.      2.  PHYSICAL THERAPY:  - Entered a referral for the patient to physical therapy.  She would like to go to [facility] in [place].  She would like to try land therapy first but is also open to pool therapy which I noted on her order.    3.  MEDICATIONS: No changes are made to the patient's medications.  Patient sees the pain clinic.  - Patient can continue Tylenol as needed.  - Patient takes pregabalin 25 mg 2 times daily.  - Patient takes duloxetine 120 mg daily for mood.  - Patient cannot take NSAIDs since she is on warfarin.    4.  INTERVENTIONS:  - Offer the patient repeat C7-T1 interlaminar epidural steroid injection.  Patient would like to proceed.  She is on warfarin.  We will get medical clearance for her to hold her warfarin before her injection.      5.  PATIENT EDUCATION: Patient is in agreement the above plan.  All questions were answered.      6.  FOLLOW-UP: My nurse will call the patient to schedule her C7-T1 interlaminar epidural steroid injection after we get medical clearance to hold her warfarin.  If she has questions or concerns in the meantime, she should not hesitate to call.    Subjective:     Valentina Olmedo is a 64 year old female who presents today for follow-up regarding chronic neck pain.  Patient is following up after she failed to have significant relief with right C2, C3, C4 medial branch blocks August 20, 2024.  Patient reports that since she was last seen her right arm pain has returned.  She had previously had 80% relief of right arm pain following a C7-T1 interlaminar epidural steroid injection April 8, 2024.  The right arm pain returned after her medial branch blocks last month.      Patient complains of Right-sided neck pain.  Pain radiates into the right shoulder, on the right lateral upper arm, into the right extensor forearm into the right dorsal hand.  She has chronic numbness and tingling in all her fingers of both hands.  She states her arms feel  weak.    Patient also continues to complain of numbness and tingling in bilateral lower extremities.  On the right it starts in her feet and radiates up to her thigh.  On the left it radiates up to her knee.    Overall, she rates her pain today as a 6 out of 10.  She denies any aggravating or alleviating factors to the pain.      Treatment to date:  - Physical therapy times 1 April 2024.  - Physical therapy off-and-on for many years.  She went to 1 session of physical therapy for chronic pain April 2, 2022.  - Outpatient physical therapy was August 2022.  - She has had home care PT and OT 2023.    - Chiropractic treatment was helpful in the past,  - Right C2, C3, C4 medial branch blocks August 28, 2024-failed  - C7-T1 interlaminar epidural steroid injection April 8, 2024 with 80% relief for over 4 months  - C7-T1 interlaminar epidural steroid injection November 9, 2023 with 70% relief of pain lasting about 3 months  - No spine surgeries  - Tylenol   - Patient takes duloxetine 120 mg daily for mood  - Tizanidine 4 mg 3 times daily  -Gabapentin caused a rash  - Pregabalin  - Prednisone    Review of systems:  Positive for numbness/tingling, weakness, headache, pain much worse at night, trip/tunnel/falls, difficulty with hand skills.  Negative for loss of bowel/bladder control, inability to urinate, difficulty swallowing, fevers, unintentional weight loss.  Objective:   CONSTITUTIONAL:  Vital signs as above.  No acute distress.  The patient is well nourished and well groomed.  Patient is observed sitting in a wheelchair.  She is morbidly obese.  PSYCHIATRIC:  The patient is awake, alert, oriented to person, place and time.  The patient is answering questions appropriately with clear speech.  Normal affect.  HEENT: Normocephalic, atraumatic.  Sclera clear.    SKIN: Exposed skin is clean, dry, intact without rashes.  MUSCULOSKELETAL: Patient is observed sitting in wheelchair.   The patient has  5/5 strength for the  bilateral shoulder abductors, elbow flexors/extensors, wrist extensors, finger flexors/abductors.  Tender to palpation right cervical paraspinous muscles and right upper trapezius muscle with hypertonicity.  NEUROLOGICAL:   Negative Gerardo sign bilaterally.  Subjective sensory deficit fingers of bilateral hands and bilateral lower extremity stocking distribution.     RESULTS:   I reviewed the CT cervical spine from [facility] dated July 24, 2024.  At C3-4 there is mild thickening and ossification of the ligamentum flavum.  This, in combination with a broad-based right paracentral disc osteophyte complex contributes to bilateral foraminal stenosis, more severe on the right.  At C4-5 there is ossification of the ligamentum flavum with subtle flattening of the posterior dorsal subarachnoid space.  There is a broad-based disc osteophyte and facet hypertrophic change with bilateral foraminal stenosis.  At C5-6 there is subtle flattening of the left aspect of the ventral cord related to a disc osteophyte.  There is also severe bilateral foraminal stenosis.  At C6-7 there is bilateral foraminal stenosis.  At C7-T1 there is bilateral foraminal stenosis.    I reviewed the MRI cervical spine no contrast from [facility] dated July 5, 2023.  The possible densely mineralized meningioma at C4-5 is not visualized on the MRI.  There is multilevel cervical spondylosis.  At C2-3 there is moderate right foraminal stenosis.  At C3-4 there is mild spinal canal stenosis with moderate left and severe right foraminal stenosis.  At C4-5 there is moderate spinal canal stenosis with moderate right and severe left foraminal stenosis.  At C5-6 there is moderate to severe spinal canal stenosis with severe left and moderate right foraminal stenosis.  At C6-7 there is moderate spinal canal stenosis with severe left and moderate right foraminal stenosis.    I reviewed the MRI lumbar spine from [facility] dated June 30, 2023.  This  shows chronic compression deformities at L1, L2, and L5.  There is epidural lipomatosis and multilevel spondylosis.  At L5-S1 there is mild to moderate thecal sac stenosis, mild left and mild to moderate right foraminal stenosis.  At L4-5 there is moderate to severe thecal sac stenosis and mild bilateral foraminal stenosis.    EMG of bilateral lower extremities September 4, 2024:  Interpretation: Abnormal study: There is electrodiagnostic evidence of:     1.  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  At this time appears to be affecting SNAPs of the lower greater than upper extremities with no effect on the motor nerve conduction studies.       2.  There is amplitude drop of the bilateral tibial motor nerve conduction studies across the calf.  This is likely technical secondary to body habitus, but I cannot completely exclude tibial neuropathy through the calf or knee.       3.  There is no electrodiagnostic evidence of lumbosacral radiculopathy or peroneal neuropathy in the bilateral lower extremities.        Again, thank you for allowing me to participate in the care of your patient.        Sincerely,        Lou Swanson PA-C

## 2024-09-12 NOTE — TELEPHONE ENCOUNTER
Pt was seen today, 9/12/24, by Lou FLORENTINO at the [facility] location. It was recommended that patient proceed with a repeat C7-T1 interlaminar epidural steroid injection for her neck pain.      Per [facility] anticoagulation guidelines, pt would need to HOLD her Coumadin for 4 days and INR to be 1.2 or less the day of injection.   Will need to get clearance from managing provider if OK for patient to hold medication. If any bridging needed, these instructions would come from manager provider as well.      Please advise if patient okay to hold medication. If clearance given we will then contact the patient to schedule. Please let us know if you have any questions. Thank you!

## 2024-09-12 NOTE — PATIENT INSTRUCTIONS
C7/T`1 epidural steroid injection has been ordered today.      Please note that this injection uses cortisone.  The cortisone may somewhat weaken the immune system.  It is unknown how much the immune system is weakened.  It is unknown if it is weakened to the point that you may be more likely to get the COVID-19 virus, or if you do get the COVID-19 virus, if you would be sicker than you would have been if you had not had the cortisone injection.  If you do not wish to proceed with the injection, please let the nurse/physician know and do NOT schedule the injection.    Please note that since your immune system is weakened from the cortisone, having any vaccine/shot may be less effective if you have this vaccine within 2 weeks from your cortisone injection.  It is advised to wait 2 weeks after your cortisone injection to have any vaccine (or if you have a vaccine first, wait 2 weeks before you have the cortisone injection).    Please schedule this injection at least 1 week  from now to allow time for insurance prior authorization.  On the day of your injection, you cannot be sick or taking antibiotics.  If you become sick and are prescribed, please call the clinic so your injection can be rescheduled for once you have completed your antibiotics.  You will need to bring a  with you for your injection.   If you have any questions or concerns prior to your injection, please do not hesitate to call the nurse navigation line at [phone] or contact Lou Swanson through [employer].

## 2024-09-16 ENCOUNTER — THERAPY VISIT (OUTPATIENT)
Dept: SLEEP MEDICINE | Facility: CLINIC | Age: 64
End: 2024-09-16
Payer: COMMERCIAL

## 2024-09-16 DIAGNOSIS — G47.33 OBSTRUCTIVE SLEEP APNEA (ADULT) (PEDIATRIC): ICD-10-CM

## 2024-09-16 PROCEDURE — 95811 POLYSOM 6/>YRS CPAP 4/> PARM: CPT | Performed by: INTERNAL MEDICINE

## 2024-09-17 ENCOUNTER — OFFICE VISIT (OUTPATIENT)
Dept: PSYCHIATRY | Facility: CLINIC | Age: 64
End: 2024-09-17
Payer: COMMERCIAL

## 2024-09-17 VITALS
WEIGHT: 293 LBS | HEIGHT: 67 IN | SYSTOLIC BLOOD PRESSURE: 118 MMHG | BODY MASS INDEX: 45.99 KG/M2 | HEART RATE: 76 BPM | DIASTOLIC BLOOD PRESSURE: 70 MMHG | OXYGEN SATURATION: 97 %

## 2024-09-17 DIAGNOSIS — G25.71 AKATHISIA: Primary | ICD-10-CM

## 2024-09-17 DIAGNOSIS — F25.9 SCHIZOAFFECTIVE DISORDER, UNSPECIFIED TYPE (H): ICD-10-CM

## 2024-09-17 DIAGNOSIS — F43.10 PTSD (POST-TRAUMATIC STRESS DISORDER): ICD-10-CM

## 2024-09-17 DIAGNOSIS — G47.33 OSA (OBSTRUCTIVE SLEEP APNEA): ICD-10-CM

## 2024-09-17 DIAGNOSIS — F33.8 SEASONAL AFFECTIVE DISORDER (H): ICD-10-CM

## 2024-09-17 DIAGNOSIS — F25.1 SCHIZOAFFECTIVE DISORDER, DEPRESSIVE TYPE (H): ICD-10-CM

## 2024-09-17 DIAGNOSIS — R25.1 TREMOR: ICD-10-CM

## 2024-09-17 PROCEDURE — 99417 PROLNG OP E/M EACH 15 MIN: CPT | Performed by: PSYCHIATRY & NEUROLOGY

## 2024-09-17 PROCEDURE — 99215 OFFICE O/P EST HI 40 MIN: CPT | Performed by: PSYCHIATRY & NEUROLOGY

## 2024-09-17 RX ORDER — QUETIAPINE FUMARATE 25 MG/1
25-50 TABLET, FILM COATED ORAL DAILY PRN
Qty: 180 TABLET | Refills: 2 | Status: SHIPPED | OUTPATIENT
Start: 2024-09-17

## 2024-09-17 RX ORDER — ARIPIPRAZOLE 15 MG/1
15 TABLET ORAL EVERY MORNING
Qty: 90 TABLET | Refills: 2 | Status: SHIPPED | OUTPATIENT
Start: 2024-09-17

## 2024-09-17 RX ORDER — BENZTROPINE MESYLATE 0.5 MG/1
0.5 TABLET ORAL 2 TIMES DAILY
Qty: 180 TABLET | Refills: 2 | Status: SHIPPED | OUTPATIENT
Start: 2024-09-17 | End: 2024-10-01 | Stop reason: SINTOL

## 2024-09-17 RX ORDER — DULOXETIN HYDROCHLORIDE 60 MG/1
120 CAPSULE, DELAYED RELEASE ORAL DAILY
Qty: 180 CAPSULE | Refills: 2 | Status: SHIPPED | OUTPATIENT
Start: 2024-09-17

## 2024-09-17 RX ORDER — PRAZOSIN HYDROCHLORIDE 2 MG/1
2 CAPSULE ORAL AT BEDTIME
Qty: 90 CAPSULE | Refills: 2 | Status: SHIPPED | OUTPATIENT
Start: 2024-09-17

## 2024-09-17 ASSESSMENT — PAIN SCALES - GENERAL: PAINLEVEL: MILD PAIN (2)

## 2024-09-17 NOTE — PROGRESS NOTES
"  Medical Decision Makin. Schizoaffective disorder, depressive type (H)  Depressive symptoms improving with increase Abilify dose. No AH. Endorses occasional VH- sees the man who used to live (and who ) in her new apartment. Pt had questions about her diagnosis -schizoaffective disorder versus bipolar.  Weekly med set up by [facility].  Plan:   -I called nurse Katelyn, [phone]. She will remove as needed as needed Seroquel from med box and leave vial for pt to use prn.  Continue:  Abilify 15mg q am.    Seroquel 25-50 mg daily prn agitation  Cymbalta 120 mg daily.  melatonin 3 mg nightly.  Prazosin 2 mg nightly    -Questions answered and teaching done on mood disorder, bipolar spectrum, schizoaffective disorder.  I reviewed past notes.  When I first saw pt in , she had diagnosis of MDD.  She was diagnosed with schizoaffective disorder during [facility] hospitalization in .  I reviewed current problem list.  Pt agreed with current diagnoses.    [facility] hospitalization 2020 - 2020   DISCHARGE DIAGNOSES:   Axis I:  1. Schizoaffective disorder, bipolar type.  2. Delirium, resolved.  3. Post-traumatic stress disorder by history.  4. 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  Axis II: Deferred.  Axis III: Hypertension, hypothyroidism, atrial fibrillation, history of  pulmonary embolism managed on chronic warfarin, stage 3 kidney disease, and  seizure disorder managed by a neurologist with several antiepileptic drugs.  -----------------------------------------------------/---------------------------------------------------------      2.  Akathisia and tremor L hand  Complains of feeling restless, \"cannot sit still\". New left hand tremor started after Abilify increased in July.  Suspect medication side effect.  Plan: Teaching done on akathisia vs. TD. Risk/benefits of Cogentin discussed.  Start Cogentin 0.5 mg " "twice daily.  [facility] referral.      3. High risk medication use  Risks and benefits of medication discussed (ie metabolic s/e's, TD, CV risk). No involuntary movements noted on MSE.    Metabolic conditions treated by PCP.    Labs reviewed: BMP 06/15/24.  Discus: 9/17/24 (0)    4. Epilepsy  Managed by Vikki Benitez MD, [facility]. Pt will call to see if a neurologist in their group can evaluate left arm tremor.      5. Obstructive sleep apnea  Overnight sleep study completed last night.  Hopes to get equipment soon.    6. Borderline personality disorder  Hx of DBT at [facility] in the past. Multiple recent attempts made to engage pt with therapy:  -I called [facility] in Dec to refer pt,  they said had to check on eligibility and would call pt back.  Pt said \"Never called me\".    -No Show for Therapy intake appointment on 3/19/24.  -Telephone note, 7/10/24, by Emerita Ramos, \"Writer made second attempt call to schedule patient for transition clinic therapy. Left message for patient to call us back\".         F/U Dec 2025 with community provider.  Pt is aware my last day in clinic is 10/29/24.           History of Present Illness:   Valentina Olmedo is a 63yo. female here for SCAD, bipolar type, PTSD, Neurocognitive disorder, seizure disorder here for f/u.  Last seen July. Seen w/spouse, René.      Timeline:  7/9/24- Increase Abilify 15mg q am.   2/27/24- pt requested decrease seroquel due to wt issues.  12/12/23- Abilify started.      Medication history  Past mood stabilizers:  -Seroquel 100 mg nightly stopped 7/2023 due to weight gain.   -Tegretol 400 mg 3 times daily. Dose decreased by neurology >1 year ago due to falls.  -Lamictal 200mg DC'd due to dizzyness.       In-home services:  -PCA  -Med set up q Monday by nurse.  -Food from [facility], [facility]    Weekly INR.    SOC: Lives with  René. Cat, \"Yosi\".   [employer].      ROS:  Denies SI/manic/sx.  See HPI, " otherwise negative.      Current Outpatient Medications:     ARIPiprazole (ABILIFY) 15 MG tablet, Take 1 tablet (15 mg) by mouth every morning., Disp: 90 tablet, Rfl: 2    benztropine (COGENTIN) 0.5 MG tablet, Take 1 tablet (0.5 mg) by mouth 2 times daily., Disp: 180 tablet, Rfl: 2    DULoxetine (CYMBALTA) 60 MG capsule, Take 2 capsules (120 mg) by mouth daily., Disp: 180 capsule, Rfl: 2    fluticasone-salmeterol (ADVAIR-HFA) 230-21 MCG/ACT inhaler, Inhale 2 puffs into the lungs 2 times daily, Disp: 12 g, Rfl: 12    prazosin (MINIPRESS) 2 MG capsule, Take 1 capsule (2 mg) by mouth at bedtime., Disp: 90 capsule, Rfl: 2    QUEtiapine (SEROQUEL) 25 MG tablet, Take 1-2 tablets (25-50 mg) by mouth daily as needed (anxiety). For agitation., Disp: 180 tablet, Rfl: 2    albuterol (PROAIR HFA) 108 (90 Base) MCG/ACT inhaler, Inhale 2 puffs into the lungs every 4 hours as needed for shortness of breath or wheezing, Disp: 8 g, Rfl: 4    azelastine (ASTELIN) 0.1 % nasal spray, , Disp: , Rfl:     Brivaracetam (BRIVIACT) 100 MG tablet, Take 150 mg by mouth 2 times daily , Disp: , Rfl:     carBAMazepine (TEGRETOL) 200 MG tablet, 200mg in the  at lunch and 400mg at HS, Disp: , Rfl:     denosumab (PROLIA) 60 MG/ML SOSY injection, Inject 60 mg Subcutaneous once Per pt report, Disp: , Rfl:     EPINEPHrine (ANY BX GENERIC EQUIV) 0.3 MG/0.3ML injection 2-pack, Inject 0.3 mLs (0.3 mg) into the muscle as needed for anaphylaxis, Disp: 2 each, Rfl: 1    esomeprazole (NEXIUM) 40 MG DR capsule, TAKE 1 CAPSULE BY MOUTH EVERY MORNING BEFORE BREAKFAST (TAKE 30-60 MINUTES BEFORE EATING), Disp: 28 capsule, Rfl: 2    fexofenadine (ALLEGRA) 180 MG tablet, TAKE 1 TABLET BY MOUTH DAILY, Disp: 90 tablet, Rfl: 3    fluticasone (FLONASE) 50 MCG/ACT nasal spray, INHALE 1 SPRAY IN EACH NOSTRIL DAILY, Disp: 16 g, Rfl: 2    GAVILYTE-G 236 g suspension, , Disp: , Rfl:     GEMTESA 75 MG TABS tablet, , Disp: , Rfl:     levothyroxine (SYNTHROID/LEVOTHROID)  300 MCG tablet, TAKE 1 TABLET BY MOUTH DAILY, Disp: 28 tablet, Rfl: 12    melatonin 3 MG tablet, Take 1 tablet (3 mg) by mouth nightly as needed for sleep, Disp: 90 tablet, Rfl: 4    multivitamin with iron (CHROMAGEN) TABS half-tab, Take 1 tablet by mouth daily, Disp: , Rfl:     nystatin (MYCOSTATIN) 573213 UNIT/GM external cream, Apply topically 2 times daily as needed (skin infection/inflammation) (Apply to the most sensitive inflamed areas of skin), Disp: 90 g, Rfl: 2    polyethylene glycol (MIRALAX) 17 GM/Dose powder, , Disp: , Rfl:     polymixin b-trimethoprim (POLYTRIM) 41630-0.1 UNIT/ML-% ophthalmic solution, Place 1-2 drops into the right eye every 4 hours, Disp: 10 mL, Rfl: 0    prednisoLONE acetate (PRED FORTE) 1 % ophthalmic suspension, , Disp: , Rfl:     pregabalin (LYRICA) 25 MG capsule, Take 1 capsule (25 mg) by mouth 2 times daily., Disp: 60 capsule, Rfl: 0    pregabalin (LYRICA) 75 MG capsule, Take 1 capsule (75 mg) by mouth 2 times daily, Disp: 60 capsule, Rfl: 2    rosuvastatin (CRESTOR) 20 MG tablet, TAKE 1 TABLET BY MOUTH DAILY, Disp: 90 tablet, Rfl: 2    salmeterol (SEREVENT DISKUS) 50 MCG/ACT inhaler, INHALE 1 PUFF INTO THE LUNGS TWICE A DAY, Disp: 60 each, Rfl: 11    SENNA-TIME 8.6 MG tablet, TAKE 2 TABLETS (17.2MG) BY MOUTH TWICE A DAY, Disp: 120 tablet, Rfl: 11    SUMAtriptan (IMITREX) 50 MG tablet, Take 1 tablet (50 mg) by mouth at onset of headache for migraine May repeat in 2 hours. Max 4 tablets/24 hours., Disp: 12 tablet, Rfl: 3    varenicline (CHANTIX) 1 MG tablet, TAKE 1 TABLET BY MOUTH TWICE A DAY, Disp: 56 tablet, Rfl: 0    warfarin ANTICOAGULANT (COUMADIN) 5 MG tablet, TAKE 2 TABLETS BY MOUTH ON MON AND FRI ; TAKE 2 & 1/2 TABLETS BY MOUTH ALL OTHER DAYS AS DIRECTED BY INR CLINIC, Disp: 56 tablet, Rfl: 11    zolpidem (AMBIEN) 5 MG tablet, Take tablet by mouth 15 minutes prior to sleep, for Sleep Study, Disp: 1 tablet, Rfl: 0    Current Facility-Administered Medications:      "denosumab (PROLIA) injection 60 mg, 60 mg, Subcutaneous, Q6 Months, Alice Rolle MD, 60 mg at 11/14/23 1445    denosumab (PROLIA) injection 60 mg, 60 mg, Subcutaneous, Q6 Months, Alice Rolle MD, 60 mg at 05/14/24 1516       /70 (BP Location: Right arm, Patient Position: Sitting, Cuff Size: Adult Large)   Pulse 76   Ht 1.702 m (5' 7\")   Wt (!) 167.8 kg (370 lb)   LMP  (LMP Unknown)   SpO2 97%   BMI 57.95 kg/m       MSE:      Alert & oriented x 3.   Appearance: Casually dressed, seated in w/c  Speech: Normal rate, rhythm and tone.  Gait: Not observed, in w/c.  Musculoskeletal: No abnormal movements. No fasciculations.  Mood/Affect: Anxious  Thought Process: Normal rate, perseverative  Thought Content: No suicide or homicide ideation.  Associations: Intact, no delusions.  Perceptions: Denied VH/AH.  Memory: recent and remote memory intact, not formally tested  Attention span and concentration: normal, not formally tested  Language: Intact.  Fund of Knowledge: Normal.  Insight and Judgement: Fair         Total time spent on this encounter, on the date of service, including pre-visit review of separately obtained history, face-to-face interaction performing medically appropriate physical exam, patient counseling/education, interpretation of diagnostic results, care coordination and documentation was 72 minutes.    Additional time needed for coordination of care with home care nurse and pt education.        Breana Puente MD    "

## 2024-09-17 NOTE — NURSING NOTE
Completed a split night PSG per provider order.    Preliminary AHI 61.6.  A final therapeutic PAP pressure was not achieved.    Supine REM was not seen on therapeutic pressure.    Patient reports feeling not refreshed in AM.

## 2024-09-17 NOTE — PATIENT INSTRUCTIONS
[facility]    1. Your sleep study will be reviewed by a sleep physician within the next few days.     2. Please follow up in the sleep clinic as scheduled, or, make an appointment with your sleep provider to be seen within two weeks to discuss the results of the sleep study.    3. If you have any questions or problems with your treatment plan, please contact your sleep clinic provider at [phone] to further manage your condition.    4. Please review your attached medication list, and, at your follow-up appointment advise your sleep clinic provider about any changes.    5. Go to [email or web address] for more information about your sleep problems.    JAIRON Blackwell  September 17, 2024

## 2024-09-17 NOTE — PROGRESS NOTES
"  Mental Health and Collaborative Care Psychiatry Service Rooming Note      Most pressing mental health concern at this time: \" it's getting closer to the time when my depression symptoms are going up\"   Home nurse set up her medications  Moderate resting left hand tremors observed initially      Any new physical health conditions or diagnoses affecting you that we should be aware of: tired because she went for a sleep study last night      Side effects related to medications patient would like to discuss with the provider:  feeling groggy      Are you taking your medications as prescribed?  yes      Do you need refills of any of the medications?  Will discuss with provider      Are you taking any recreational substances? none      Lorraine Villegas LPN  September 17, 2024  11:11 AM      "

## 2024-09-18 ENCOUNTER — ANTICOAGULATION THERAPY VISIT (OUTPATIENT)
Dept: ANTICOAGULATION | Facility: CLINIC | Age: 64
End: 2024-09-18
Payer: COMMERCIAL

## 2024-09-18 DIAGNOSIS — Z86.711 HISTORY OF PULMONARY EMBOLISM: ICD-10-CM

## 2024-09-18 DIAGNOSIS — Z79.01 LONG TERM (CURRENT) USE OF ANTICOAGULANTS: ICD-10-CM

## 2024-09-18 DIAGNOSIS — I26.99 PULMONARY EMBOLISM WITH INFARCTION (H): Primary | ICD-10-CM

## 2024-09-18 LAB
INR (EXTERNAL): 2.3
SLPCOMP: NORMAL

## 2024-09-18 NOTE — TELEPHONE ENCOUNTER
Carlos refused, had been ordered by Dr Rooney, PCP.  
Telephone Encounter by Lorraine Villegas LPN at 8/5/2019  3:31 PM     Author: Lorraine Villegas LPN Service: -- Author Type: Licensed Nurse    Filed: 8/5/2019  3:34 PM Encounter Date: 8/5/2019 Status: Signed    : Lorraine Villegas LPN (Licensed Nurse)       Rx for Senna already approved by Dr. Rooney 7/23/19 ( see Rx below)               
None

## 2024-09-18 NOTE — PROGRESS NOTES
ANTICOAGULATION MANAGEMENT     Valentina Olmedo 64 year old female is on warfarin with therapeutic INR result. (Goal INR 2.0-3.0)    Recent labs: (last 7 days)     09/18/24  0000   INR 2.3       ASSESSMENT     Source(s): Chart Review  Previous INR was Therapeutic last 2(+) visits  Medication, diet, health changes since last INR chart reviewed; none identified         PLAN     Recommended plan for no diet, medication or health factor changes affecting INR     Dosing Instructions: Continue your current warfarin dose with next INR in 2 weeks       Summary  As of 9/18/2024      Full warfarin instructions:  7.5 mg every Mon, Fri; 10 mg all other days   Next INR check:  10/2/2024               Telephone call with [facility] home care nurse who agrees to plan and repeated back plan correctly    Orders given to  Homecare nurse/facility to recheck    Education provided: Please call back if any changes to your diet, medications or how you've been taking warfarin    Plan made per [facility] anticoagulation protocol    Melissa Montenegro RN  9/18/2024  Anticoagulation Clinic  [employer] for routing messages: raquel MATHUR  [facility] patient phone line: [phone]        _______________________________________________________________________     Anticoagulation Episode Summary       Current INR goal:  2.0-3.0   TTR:  59.1% (1 y)   Target end date:  Indefinite   Send INR reminders to:  PIPO MATHUR    Indications    History of pulmonary embolism (Resolved) [Z86.711]  Pulmonary embolism with infarction (H) [I26.99]  Long term (current) use of anticoagulants [Z79.01]  History of pulmonary embolism [Z86.711]             Comments:               Anticoagulation Care Providers       Provider Role Specialty Phone number    Promise Vanegas MD Referring Family Medicine [phone]    Donald Rooney MD Referring Internal Medicine [phone]    Jose Maria Morin MD Referring Family Medicine [phone]

## 2024-09-19 ENCOUNTER — MEDICAL CORRESPONDENCE (OUTPATIENT)
Dept: HEALTH INFORMATION MANAGEMENT | Facility: CLINIC | Age: 64
End: 2024-09-19
Payer: COMMERCIAL

## 2024-09-19 ENCOUNTER — TELEPHONE (OUTPATIENT)
Dept: FAMILY MEDICINE | Facility: CLINIC | Age: 64
End: 2024-09-19
Payer: COMMERCIAL

## 2024-09-19 NOTE — TELEPHONE ENCOUNTER
SEIPDEH-PROCEDURAL ANTICOAGULATION  MANAGEMENT    Valentina requesting pre-procedure hold orders for warfarin and review for bridging      Procedure date: 10/2/24       Procedure: Epidural Steroid Injection      Procedure location and phone number (if external): [place]     Number of warfarin hold days requested and/or target INR: 5 days    Pre-op date: Not applicable    Note from 9/17/24 as show below        Routing to Anticoagulation Pharmacist for review.     ACC pool: raquel Meza RN

## 2024-09-19 NOTE — TELEPHONE ENCOUNTER
General Call    Contacts       Contact Date/Time Type Contact Phone/Fax    09/19/2024 11:47 AM CDT Phone (Incoming) Valentina Olmedo (Self) [phone] (M)          Reason for Call:  anticoagulation     What are your questions or concerns:  Valentina is scheduled on 10/2 for a procedure and was told she needs to hold her Warfarin before that day. Would like to talk to you regarding that hold plan     Date of last appointment with provider: NA    Could we send this information to you in [place] or would you prefer to receive a phone call?:   Patient would prefer a phone call   Okay to leave a detailed message?: Yes at Cell number on file:    Telephone Information:   Mobile [phone]

## 2024-09-19 NOTE — TELEPHONE ENCOUNTER
"SEEMA-PROCEDURAL ANTICOAGULATION  MANAGEMENT    ASSESSMENT     Warfarin interruption plan for CANDY on 10/02/2024.    Indication for Anticoagulation: PE    PE 2017  Per 2014 bariatric notes, vague history of blood clotting during pregnancy leading to fetal demise, with recommendation to take ASA with subsequent pregnancies   No hematology testing on file   Has tolerated hold, no bridge, CANDY 2024, , colonoscopy 2024      Seema-Procedure Risk stratification for thromboembolism: moderate ( Chest guidelines)    VTE:  CHEST Perioperative Management guidelines suggest against bridging for patients with Hx of VTE as sole clinical indication for warfarin except in high risk stratification patients.    RECOMMENDATION     Pre-Procedure:  Hold warfarin for 5 days, until after procedure startin2024   No Bridge    Post-Procedure:  Resume warfarin dose if okay with provider doing procedure on night of procedure, 10/02/2024 PM: 20mg & 10/03/2024 20mg  Recheck INR ~ 7 days after resuming warfarin     Plan routed to referring provider for approval  ?   Paula Beasley [facility]    SUBJECTIVE/OBJECTIVE     Valentina Olmedo, a 64 year old female    Goal INR Range: 2.0-3.0     Patient bridged in past: Yes: 2024 prophylactic due to prolonged time off anticoagulation        Wt Readings from Last 3 Encounters:   24 (!) 167.7 kg (369 lb 12.8 oz)   24 (!) 165.1 kg (364 lb)   24 (!) 165.3 kg (364 lb 8 oz)      Ideal body weight: 61.6 kg (135 lb 12.9 oz)  Adjusted ideal body weight: 104.1 kg (229 lb 7.7 oz)     Estimated body mass index is 57.95 kg/m  as calculated from the following:    Height as of 24: 1.702 m (5' 7\").    Weight as of 24: 167.8 kg (370 lb).    Lab Results   Component Value Date    INR 2.3 2024    INR 2.5 2024    INR 2.9 2024     Lab Results   Component Value Date    HGB 13.1 2024    HCT 40.0 2024     2024     Lab Results "   Component Value Date    CR 0.85 05/30/2024    CR 0.81 05/01/2024    CR 0.86 04/17/2024     Estimated Creatinine Clearance: 111.3 mL/min (based on SCr of 0.85 mg/dL).

## 2024-09-20 DIAGNOSIS — E03.9 ACQUIRED HYPOTHYROIDISM: ICD-10-CM

## 2024-09-20 DIAGNOSIS — S09.90XS CLOSED HEAD INJURY, SEQUELA: ICD-10-CM

## 2024-09-20 DIAGNOSIS — E66.813 CLASS 3 SEVERE OBESITY DUE TO EXCESS CALORIES WITH SERIOUS COMORBIDITY AND BODY MASS INDEX (BMI) OF 50.0 TO 59.9 IN ADULT (H): ICD-10-CM

## 2024-09-20 DIAGNOSIS — F17.200 NICOTINE USE DISORDER: ICD-10-CM

## 2024-09-20 DIAGNOSIS — I10 ESSENTIAL HYPERTENSION: ICD-10-CM

## 2024-09-20 DIAGNOSIS — I26.99 PULMONARY EMBOLISM WITH INFARCTION (H): ICD-10-CM

## 2024-09-20 DIAGNOSIS — E78.2 MIXED HYPERLIPIDEMIA: ICD-10-CM

## 2024-09-20 DIAGNOSIS — E66.01 CLASS 3 SEVERE OBESITY DUE TO EXCESS CALORIES WITH SERIOUS COMORBIDITY AND BODY MASS INDEX (BMI) OF 50.0 TO 59.9 IN ADULT (H): ICD-10-CM

## 2024-09-20 DIAGNOSIS — G47.33 OBSTRUCTIVE SLEEP APNEA (ADULT) (PEDIATRIC): Primary | ICD-10-CM

## 2024-09-20 NOTE — TELEPHONE ENCOUNTER
Pt called back in for Lisa. Please call pt back now, said she will be available for the rest of the evening.

## 2024-09-20 NOTE — TELEPHONE ENCOUNTER
Writer attempted to reach patient, no answer at this time. Left general VM requesting callback at patient convenience to discuss upcoming procedure plan. Detailed [employer] message sent with instructions as well. CHRIS PalN, RN

## 2024-09-20 NOTE — TELEPHONE ENCOUNTER
Upon chart review see another encounter was created outlining anticoagulation plan. Pt scheduled on 10/2 with Dr. Levy.

## 2024-09-23 ENCOUNTER — OFFICE VISIT (OUTPATIENT)
Dept: PALLIATIVE MEDICINE | Facility: OTHER | Age: 64
End: 2024-09-23
Payer: COMMERCIAL

## 2024-09-23 VITALS — DIASTOLIC BLOOD PRESSURE: 70 MMHG | HEART RATE: 76 BPM | SYSTOLIC BLOOD PRESSURE: 137 MMHG | OXYGEN SATURATION: 96 %

## 2024-09-23 DIAGNOSIS — M48.02 CERVICAL STENOSIS OF SPINAL CANAL: Primary | ICD-10-CM

## 2024-09-23 DIAGNOSIS — G89.29 CHRONIC INTRACTABLE PAIN: ICD-10-CM

## 2024-09-23 DIAGNOSIS — M54.12 CERVICAL RADICULOPATHY: ICD-10-CM

## 2024-09-23 DIAGNOSIS — M54.81 BILATERAL OCCIPITAL NEURALGIA: ICD-10-CM

## 2024-09-23 PROCEDURE — 99214 OFFICE O/P EST MOD 30 MIN: CPT | Performed by: NURSE PRACTITIONER

## 2024-09-23 PROCEDURE — G0463 HOSPITAL OUTPT CLINIC VISIT: HCPCS | Performed by: NURSE PRACTITIONER

## 2024-09-23 RX ORDER — ACETAMINOPHEN 500 MG
650 TABLET ORAL EVERY 6 HOURS PRN
COMMUNITY

## 2024-09-23 ASSESSMENT — PAIN SCALES - GENERAL: PAINLEVEL: MODERATE PAIN (4)

## 2024-09-23 NOTE — PROGRESS NOTES
Patient presents to the clinic today for a visit with SHAHRZAD Alejo CNP regarding Pain Management.          7/22/2024     3:49 PM 8/26/2024     1:31 PM 9/23/2024     1:14 PM   PEG Score   PEG Total Score 4.33 3.33 6       UDS/CSA-     Medications-     Notes    Alison CALLAHAN [facility] Clinical Assistant

## 2024-09-23 NOTE — PROGRESS NOTES
"Date:09/23/2024      COMPREHENSIVE PAIN CLINIC FOLLOW UP EVALUATION    I had the pleasure of meeting Ms. Valentina Olmedo on 11/7/2023 in the [facility] in consult for [facility] with regards to her pain.  The patient is a 63 year old female with past medical history of chronic anticoagulation, h/o PE, physical deconditioning, CHRIS, GERD, seizure disorder, h/o CVA, cervical stenosis, chronic intractable pain, severe morbid obesity, schizoaffective disorder/depression, borderline personality disorder, PTSD, osteoporosis on Prolia, who presents in follow up for evaluation of chronic pain.  Patient is on warfarin.      Updates since last appointment on 08/26/2024.  Patient was 15 minutes late.  She presents in a wheel chair with significant other.    Chronic neck pain and knee pain.    She has an injection scheduled for her neck 10/02/2024.  She does not know the name of the injection  She is a poor historian regarding her medications, dosages and frequency.    Interventions/Injections:   08/2024 failed R) cervical MBB  04/2024 VIOLETA C7-T1 80% pain relief      Progress Notes Reviewed:  09/17/2024 Dr. Puente, Psychiatry  09/12/2024 Lou Swanson CNP    Any hospitalizations/ER/UC visits since last appointment:  no  Any falls/accidents since last appointment:  no      Primary Pain :   Today b/l knee pain R>L  - She wants to follow up at [facility]    2.    Patient endorses chronic pain in cervical spine - [facility].    Characteristics:  Any changes in pain characteristics since last appointment?  no    The patient describes the pain as constant, sharp, pressure, heavy, throbbing, dull, shooting.       What makes the pain better:  She reports that the pain is made better by \"unknown\".   What makes the pain worse:   She reports that the pain is made worse by \"unknown\".       What makes the pain better:  What makes the pain worse:    09/23/2024 current pain on 0/10 VAS:   5   Worst pain:   " 3     Best pain: 3      08/26/2024 current pain on 0/10 VAS:   5    Worst pain:  3     Best pain: 3  07/22/2024 current pain on 0/10 VAS:   7   Worst pain:  9      Best pain: 3  05/20/2024 current pain on 0/10 VAS:   7  Worst pain:   9      Best pain: 1  04/09/2024 current pain on 0/10 VAS:   6   Worst pain:  9      Best pain: 1       11/08/2023 current pain score at 4/10, but it can be as low as 4/10 or as severe as 9/10.    Current Pain Related Medications:  Any medications changes since last appointment: no    RN sets up medications once a week.    Pregabalin 75mg TID - she was unsure of the mg.  &5mg and 25mg listed on medications in chart.  Tegretol 200mg am and 400pm and 200mg q hs  Duloxetine 60mg 2 caps daily  Meclizine 25mg 1/2 to 1 tab prn dizziness - discontinued  Melatonin 3mg q hs  Oxybutynin ER  Prazosin 2mg 1 tab q hs  Seroquel 25mg BID - may increase in the future  Chantix 1mg BID  Warfarin 5mg per anticoagulation clinic   Briviact 100mg TID - epilepsy        Therapies discuss on initial consult:   Physical therapy, Pain Psychology, TENs unit, Grounding Mat, Frequency Specific Micro Current, Anti-inflammatory Lifestyle    Employment:  On [employer].     Exercise:  Last PT was 3-4 months ago.  She continues to do exercises on a regular basis.  Patient is attending hand therapy [facility] twice weekly.      Hobbies:  She does her HEP.    Mental Health:    Patient endorses chronic anxiety and depression.  Patient does follow with a [facility] mental health care provider every few month.                         Plan on 08/26/2024:  Decrease lyrica to 25mg BID - If still experiencing dizziness then discontinue.    No other medication changes today.    Follow up with [facility] for b/l knee pain.    [facility] for Cervical MBB and possible RFA.    Follow-up in clinic in 1 month.        Updates since last appointment on 07/22/2024.  Presents in a wheelchair with Ayden.    She is experiencing dizziness and  "would like to decrease lyrica to 25mg BID.    Reviewed Cervical CT  08/28/2024 R) Cervical C2,3,4 MBB and possible RFA through [facility]    Recent dx asthma following with pulmonology.      Interventions/Injections:   C7-T1 VIOLETA at [facility].    Progress Notes Reviewed:  08/13/2024 [facility] -       Any hospitalizations/ER/UC visits since last appointment:  no  Any falls/accidents since last appointment:  no        Primary Pain :   Today b/l knee pain R>L  - She wants to follow up at [facility].    2.    Patient endorses chronic pain in cervical spine - [facility]..       Characteristics:  Any changes in pain characteristics since last appointment?  no    Cervical pain  Her arms feel weak.  Patient has constant numbness and tingling in b/l fingers.  Patient has not had any spine surgery in the past.  The patient describes the pain as constant, sharp, pressure, heavy, throbbing, dull, shooting.       What makes the pain better:  She reports that the pain is made better by \"unknown\".   What makes the pain worse:   She reports that the pain is made worse by \"unknown\".       08/26/2024 current pain on 0/10 VAS:       Worst pain:        Best pain:       07/22/2024 current pain on 0/10 VAS:   7    Worst pain:  9   Best pain: 3  05/20/2024 current pain on 0/10 VAS:   7  Worst pain:   9    Best pain: 1  04/09/2024 current pain on 0/10 VAS:   6   Worst pain:  9    Best pain: 1       11/08/2023 current pain score at 4/10, but it can be as low as 4/10 or as severe as 9/10.        Current Pain Related Medications:  Any medications changes since last appointment: no    RN sets up medications once a week.    Pregabalin 75mg TID - currently titrating to a goal of 100mg TID  Tegretol 200mg am and 400pm and 200mg q hs  Duloxetine 60mg 2 caps daily  Meclizine 25mg 1/2 to 1 tab prn dizziness - discontinued  Melatonin 3mg q hs  Oxybutynin ER  Prazosin 2mg 1 tab q hs  Seroquel 25mg BID - may " increase in the future  Chantix 1mg BID  Warfarin 5mg per anticoagulation clinic   Briviact 100mg TID - epilepsy      Therapies discuss on initial consult:   Physical therapy, Pain Psychology, TENs unit, Grounding Mat, Frequency Specific Micro Current, Anti-inflammatory Lifestyle      Employment:  On [employer].     Exercise:  Last PT was 3-4 months ago.  She continues to do exercises on a regular basis.  Patient is attending hand therapy [facility] twice weekly.      Hobbies:  She does her HEP.    Mental Health:    Patient endorses chronic anxiety and depression.  Patient does follow with a Breana Morgan, mental health care provider every few month.              Plan on 07/22/2024:  Reviewed R) knee xray 11/2023.       No medication changes today.  RN fills medication box for the week.    [facility]    She wants to follow up at [facility] for her knee pain and do PT for knee pain R>L at [facility].    Follow-up in clinic 3 months.          Updates since last appointment on 05/20/2024.  She presents in a wheel chair with Al. She is confused about her dosages of medications.  She thinks she did not increase lyrica to 75mg TID due to dizziness.    Having more knee pain R>L.  She follows with [facility]. Constant pain that varies in intensity. Dull to achey and sharp. Improved with ice and rest.  Worse with walking.  She would like to do PT through [facility].    [facility] Follow-up with Lou Swanson PA-C unknown - last appt 04/2024 - can't remember when last injection for her neck was.    Dr. Puente, Psychiatrist - patient asking about seroquel doseage change      Interventions/Injections:   none    Progress Notes Reviewed:  07/18/2024 Dr. Bhatt Rheumatology -   does not have evidence to suggest connective tissue disease/inflammatory joint disease. She does have beginning of osteoarthritis.   07/09/2024 Breana Puente, Psychiatry - schizoaffective disorder, borderline personality disorder, PTSD, seizure  "disorder  06/17/2024 Dr. Ana Mancilla, General surgery -  R) upper lateral thigh hematoma      Any hospitalizations/ER/UC visits since last appointment:  no  Any falls/accidents since last appointment:  no      Primary Pain :   Today b/l knee pain R>L     2.    Patient endorses chronic pain in cervical spine L>R that started years ago without a precipitating event.  Pain radiates down b/l arms R>L and into her hands.       Characteristics:  Any changes in pain characteristics since last appointment?  no  Cervical pain  Her arms feel weak.  Patient has constant numbness and tingling in b/l fingers.  Patient has not had any spine surgery in the past.  The patient describes the pain as constant, sharp, pressure, heavy, throbbing, dull, shooting.       What makes the pain better:  She reports that the pain is made better by \"unknown\".   What makes the pain worse:   She reports that the pain is made worse by \"unknown\".   07/22/2024 current pain on 0/10 VAS:   7    Worst pain:  9   Best pain: 3  05/20/2024 current pain on 0/10 VAS:   7  Worst pain:   9    Best pain: 1  04/09/2024 current pain on 0/10 VAS:   6   Worst pain:  9    Best pain: 1       11/08/2023 current pain score at 4/10, but it can be as low as 4/10 or as severe as 9/10.        Current Pain Related Medications:  RN sets up medications once a week.    Pregabalin 75mg TID - currently titrating to a goal of 100mg TID  Tegretol 200mg am and 400pm and 200mg q hs  Duloxetine 60mg 2 caps daily  Meclizine 25mg 1/2 to 1 tab prn dizziness - discontinued  Melatonin 3mg q hs  Oxybutynin ER  Prazosin 2mg 1 tab q hs  Seroquel 25mg BID - may increase in the future  Chantix 1mg BID  Warfarin 5mg per anticoagulation clinic   Briviact 100mg TID - epilepsy    Therapies discuss on initial consult:   Physical therapy, Pain Psychology, TENs unit, Grounding Mat, Frequency Specific Micro Current, Anti-inflammatory Lifestyle    Employment:  On [employer].     Exercise:  Last PT was 3-4 " months ago.  She continues to do exercises on a regular basis.  Patient is attending hand therapy [facility] twice weekly.      Hobbies:  She does her HEP.    Mental Health:    Patient endorses chronic anxiety and depression.  Patient does follow with a [facility] mental health care provider every few month.              Updates since last appointment on 04/09/2024.  Presents with AL, Significant other with a wheelchair.  .  Last script for lyrica 50mg TID 84 tabs for 28 days.  Side Effects: none will increase today 75mg TID    Tens unit: [employer] brochure for patient support provided.  CT scan 07/24/2024 04/17/2024 Dr. Bhatt, Rheumatology - blood work ordered    [facility] at [facility]    She has been referred for pulmonary rehab.    RN comes every Tuesday to refill her medications.      Employment:  On [employer].     Exercise:  Last PT was 3-4 months ago.  She continues to do exercises on a regular basis.  Patient is attending hand therapy [facility] twice weekly.      Hobbies:  She does her HEP.    Mental Health:    Patient endorses chronic anxiety and depression.  Patient does follow with a [facility] mental health care provider every few month.      04/08/2025 [facility].  [facility] recommends  for a repeat CT cervical spine in 1 year to monitor the densely calcified lesion. This is scheduled for July 24, 2024.     She is attending PT.    B/l occipital nerve blocks with Dr. Zeng - not done    She has been referred to [facility] for her b/l knee pain.  Referred to Neurology for evaluate peripheral neuropathy and epilepsy.   07/06/2023 Dr. Jacobsen recommends weight loss prior to consideration of elective spinal surgery with goal BMI less than 40.         Interventions/Injections:     Progress Notes Reviewed:  05/14/2024 Lou Norwood PA-C [facility]  We discussed medial branch blocks as a workup for radiofrequency ablation today.  We decided that she would wait for her updated CT cervical spine first.  If the calcified  "lesion has changed, she would need to follow-up with Dr. Jacobsen.  If it is stable, we could consider medial branch blocks.  Based on her exam today I would recommend left-sided medial branch blocks in the mid cervical region.  - As long as patient has at least 50% relief of pain for at least 3 months we can also repeat the repeat C7-T1 interlaminar epidural steroid injection.      Any hospitalizations/ER/UC visits since last appointment:  no  Any falls/accidents since last appointment:  no      Primary Pain :  Patient endorses chronic pain in cervical spine L>R that started years ago without a precipitating event.  Pain radiates down b/l arms R>L and into her hands.       Characteristics:  No changes in pain characteristics since last appointment.      Her arms feel weak.  Patient has constant numbness and tingling in b/l fingers.  Patient has not had any spine surgery in the past.  The patient describes the pain as constant, sharp, pressure, heavy, throbbing, dull, shooting.       What makes the pain better:  She reports that the pain is made better by \"unknown\".   What makes the pain worse:   She reports that the pain is made worse by \"unknown\".   05/20/2024 current pain on 0/10 VAS:   7  Worst pain:   9   Best pain: 1  04/09/2024 current pain on 0/10 VAS:   6   Worst pain:  9     Best pain: 1       11/08/2023 current pain score at 4/10, but it can be as low as 4/10 or as severe as 9/10.      Current Pain Related Medications:  Any medications changes since last appointment:    Pregabalin 50mg TID - currently titrating to a goal of 100mg TID  Tegretol 200mg am and 400pm and 200mg q hs  Duloxetine 60mg 2 caps daily  Meclizine 25mg 1/2 to 1 tab prn dizziness - discontinued  Melatonin 3mg q hs  Oxybutynin ER  Prazosin 2mg 1 tab q hs  Seroquel 25mg BID  Chantix 1mg BID  Warfarin 5mg per anticoagulation clinic   Briviact 100mg TID - epilepsy      NOTE:  gabapentin causes rash, Lamictal causes dizziness, " "Pregabalin 25mg TID  Tegretol 200mg am and 400pm and 200mg q hs  Duloxetine 60mg 2 caps daily  Meclizine 25mg 1/2 to 1 tab prn dizziness  Melatonin 3mg q hs  Oxybutynin ER  Prasoin 2mg 1 tab q hs  Seroquel 25mg BID  Chantix 1mg BID  Warfarin 5mg per anticoagulation clinic   Briviact 100mg TID - epilepsy      NOTE:  gabapentin causes rash, Lamictal causes dizziness, Tizanidine 4mg TID - discontinued      Therapies discuss on initial consult:   Physical therapy, Pain Psychology, TENs unit, Grounding Mat, Frequency Specific Micro Current, Anti-inflammatory Lifestyle    Social:  Patient is  to Al and lives in an apartment in [place].  She is independent in ADL's.  She has a cat.    Employment: [employer]    Exercise:  Last PT was 3-4 months ago.  She continues to do exercises on a regular basis.  Patient is attending hand therapy [facility] twice weekly.    Hobbies:    Mental Health:    Patient endorses chronic anxiety and depression.  Patient does follow with a [facility] mental health care provider every few months.            Plan on 04/09/2024:  Increase lyrica by 1 25mg tab every 3 days until taking lyrica 25mg 2 tabs TID to help manage neuropathic pain.      Follow-up in clinic in 1 month to re-evaluate neuropathic pain.             -------------------------------------------------------------------------------------------------------------------------------------  History of pain on initial consult 11/07/2023  Subjective: She presents with Al in a wheel chair.    Patient endorses chronic pain in cervical spine L>R that started years ago without a precipitating event.  Pain radiates down b/l arms R>L and into her hands.  Her arms feel weak.  Patient has constant numbness and tingling in b/l fingers .  Patient has not had any spine surgery in the past.  The patient describes the pain as constant, sharp, heavy, throbbing, dull, shooting.  She reports that the pain is made worse by \"unknown\".  Her pain is improved " "with \"unknown\".  She rates her currenty pain score at 4/10, but it can be as low as 4/10 or as severe as 9/10. She is [facility] on [facility]23.  She uses a wheel chair at times due to injury to low back from a fall. She fx her L) elbow in September.      Patient denies any anxiety or depression.  Patient does follow with a Breana Morgan, mental health care provider every few month.  Last PT was 3-4 months ago.  She continues to do exercises on a regular basis.  Patient is attending hand therapy [facility] twice weekly.    Progress Notes Reviewed:  10/18/2023 Dr. Gracie Jacobsen, Neurological Surgery - cervical stenosis/chronic pain.  She needs to lose weight prior to surgery.  Discussed C7-T1 VIOLETA.  She wants to avoid surgery as long as she can.  10/17/2023 Dr. Mariann Puente -   10/13/2023 MISHEL Hidalgo-C [facility]    She denies any new problems with falls or balance, any new numbness or weakness of the arms or legs, any new bowel or bladder incontinence, any night sweats or unexplained fevers, or any sudden or unexpected weight loss.      Valentina Olmedo has not been seen at a pain clinic in the past.        Current Treatments:  She does not know her medication without looking them up on her phone.  Pregabalin 25mg TID per Lou Norwood, [facility]  Tegretol 200mg am and pm and 400mg q hs per Dr. Benitez for epilepsy  Meclizine 25mg 1/2 to 1 tab BID-TID dizziness - vertigo  Melatonin 3mg q hs  Oxybutynin ER - overactive bladder  Prasoin 2mg 1 tab q hs per Dr. Puente  Seroquel 25mg BID per Dr. Puente  Tizanidine 4mg TID  Chantix 1mg BID  Warfarin 5mg per anticoagulation clinic   Briviact 100mg TID - epilepsy    NOTE:  gabapentin causes rash, Lamictal causes dizziness    Previous Medication Treatments Included:  Anti-convulsants: gabapentin causes rash  Muscle relaxors:   Anti-depressants: Lamictal causes dizziness, Duloxetine 60mg 2 caps daily per Dr. Puente  Benzodiazapine's: "   Acetaminophen/NSAIDs: No NSAID due to warfarin, acetaminophen is not effective  Topicals: biofreeze causes itching, burning,  Opioids: not suppose to take opioids with warfarin  Coumadin    Other Treatments Have Included:  Physical therapy: PT 3 months ago, currently in hand therapy at [facility]  Pain Psychology:   Chiropractic:   Acupuncture:   TENs Unit:   Injections:   Surgeries:   Dry Needling:   Massage:      Past Medical History:  Medical history reviewed.  Past Medical History:   Diagnosis Date    Adrenal mass (H24) 10/12/2015    Chen Null MD  They were incidentally discovered on the CAT scan of the abdomen from December 2011 which was done for evaluation of kidney stones. F/up CT of the abdomen done on 6/26/12 showed a stable 5 mm nodular opacity in the right lower lung lobe, adjacent to the diagram. Bilateral adrenal masses average density measured less than 0 Hounsfield units, consistent with benign etio    Age-related cataract of both eyes, unspecified age-related cataract type 03/27/2024    Anxiety     Anxiety     Arthritis     Borderline personality disorder (H)     Cancer (H)     COPD (chronic obstructive pulmonary disease) (H)     Depression     Depressive disorder     History of COVID-19     Hyperlipidemia     Hypertension     Hypertension     Hyponatremia     Hypothyroidism     Malignant neoplasm of thyroid gland (H)     Chen Null MD  She had R hemithyroidectomy for a right neck tumor in 1994. According to the patient, the tumor was benign. She is not sure whether or not the tumor belonged to the thyroid gland. The surgery was done here at the [facility]. In January 2011, she was diagnosed with kidney stones. She was found to have an elevated calcium level of 11.7, with a PTH level of 368. Stone an    Primary hyperparathyroidism (H24)           PTSD (post-traumatic stress disorder)     Pulmonary embolism with infarction (H) 01/12/2021    Recurrent otitis media     Seizure  disorder (H)     Sleep apnea     cpap at [facility]    Stroke (H)     Tobacco abuse     Vertigo       Patient Active Problem List   Diagnosis    Essential hypertension    CHRIS (obstructive sleep apnea)    Seasonal and perennial allergic rhinoconjunctivitis of right eye    Closed head injury    History of thyroid cancer    Adrenal mass, left (H24)    Class 3 severe obesity due to excess calories with serious comorbidity and body mass index (BMI) of 50.0 to 59.9 in adult (H)    Nonintractable epilepsy without status epilepticus (H)    Esophageal reflux    Mixed hyperlipidemia    HLD (hyperlipidemia)    Hypothyroidism    Chronic obstructive pulmonary disease (H)    Schizoaffective disorder, depressive type (H)    DNR (do not resuscitate)    Migraine headache    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)    Fracture of vertebra due to osteoporosis with routine healing, subsequent encounter    Major depressive disorder, recurrent episode, moderate (H)    Venous stasis    Peripheral polyneuropathy    Pulmonary embolism with infarction (H)    Idiopathic osteoporosis    Physical deconditioning    PTSD (post-traumatic stress disorder)    Long term (current) use of anticoagulants    History of melanoma    Seasonal affective disorder (H24)    Vision changes    Bilateral hand pain    Problem related to housing and economic circumstances    History of pulmonary embolism    Medial knee pain, right    Bilateral sensorineural hearing loss    Intertrigo    Dysfunction of both eustachian tubes    Chronic intractable pain    Strain of lumbar paraspinal muscle, initial encounter    Cervical stenosis of spinal canal    Cervical radiculopathy    History of colonic polyps    Hematoma of right lower leg    Nicotine use disorder         Past Surgical History:  Pertinent surgical history reviewed.  Past Surgical History:   Procedure Laterality Date    ABDOMEN SURGERY      ADRENALECTOMY Left     BRAIN SURGERY      COLONOSCOPY N/A 5/7/2024    Procedure: WITH  COLONOSCOPY with polypectomies;  Surgeon: Walt Lira MD;  Location: [facility] OR    CRANIOTOMY FOR TEMPORAL LOBECTOMY Right     x3 for seizure    DILATION AND CURETTAGE      ESOPHAGOSCOPY, GASTROSCOPY, DUODENOSCOPY (EGD), COMBINED N/A 5/7/2024    Procedure: and biopsies;  Surgeon: Walt Lira MD;  Location: [facility] OR    ESOPHAGOSCOPY, GASTROSCOPY, DUODENOSCOPY (EGD), DILATATION, COMBINED N/A 5/7/2024    Procedure: ESOPHAGOGASTRODUODENOSCOPY with esophageal dilation;  Surgeon: Walt Lira MD;  Location: [facility] OR    HEAD & NECK SURGERY      HYSTERECTOMY, PAP NO LONGER INDICATED N/A     LITHOTRIPSY      PARATHYROID GLAND SURGERY      resection of adenoma    REFRACTIVE SURGERY Bilateral     RELEASE CARPAL TUNNEL Bilateral     TONSILLECTOMY & ADENOIDECTOMY      TOTAL THYROIDECTOMY      TOTAL VAGINAL HYSTERECTOMY      39 years old. Benign          Medications: Pertinent medications reviewed.  Current Outpatient Medications   Medication Sig Dispense Refill    albuterol (PROAIR HFA) 108 (90 Base) MCG/ACT inhaler Inhale 2 puffs into the lungs every 4 hours as needed for shortness of breath or wheezing 8 g 4    ARIPiprazole (ABILIFY) 15 MG tablet Take 1 tablet (15 mg) by mouth every morning. 90 tablet 2    azelastine (ASTELIN) 0.1 % nasal spray       benztropine (COGENTIN) 0.5 MG tablet Take 1 tablet (0.5 mg) by mouth 2 times daily. 180 tablet 2    Brivaracetam (BRIVIACT) 100 MG tablet Take 150 mg by mouth 2 times daily       carBAMazepine (TEGRETOL) 200 MG tablet 200mg in the  at lunch and 400mg at HS      denosumab (PROLIA) 60 MG/ML SOSY injection Inject 60 mg Subcutaneous once Per pt report      DULoxetine (CYMBALTA) 60 MG capsule Take 2 capsules (120 mg) by mouth daily. 180 capsule 2    EPINEPHrine (ANY BX GENERIC EQUIV) 0.3 MG/0.3ML injection 2-pack Inject 0.3 mLs (0.3 mg) into the muscle as needed for anaphylaxis 2 each 1    esomeprazole (NEXIUM) 40 MG DR capsule TAKE 1 CAPSULE  BY MOUTH EVERY MORNING BEFORE BREAKFAST (TAKE 30-60 MINUTES BEFORE EATING) 28 capsule 2    fexofenadine (ALLEGRA) 180 MG tablet TAKE 1 TABLET BY MOUTH DAILY 90 tablet 3    fluticasone (FLONASE) 50 MCG/ACT nasal spray INHALE 1 SPRAY IN EACH NOSTRIL DAILY 16 g 2    fluticasone-salmeterol (ADVAIR-HFA) 230-21 MCG/ACT inhaler Inhale 2 puffs into the lungs 2 times daily 12 g 12    GAVILYTE-G 236 g suspension       GEMTESA 75 MG TABS tablet       levothyroxine (SYNTHROID/LEVOTHROID) 300 MCG tablet TAKE 1 TABLET BY MOUTH DAILY 28 tablet 12    melatonin 3 MG tablet Take 1 tablet (3 mg) by mouth nightly as needed for sleep 90 tablet 4    multivitamin with iron (CHROMAGEN) TABS half-tab Take 1 tablet by mouth daily      nystatin (MYCOSTATIN) 663500 UNIT/GM external cream Apply topically 2 times daily as needed (skin infection/inflammation) (Apply to the most sensitive inflamed areas of skin) 90 g 2    polyethylene glycol (MIRALAX) 17 GM/Dose powder       polymixin b-trimethoprim (POLYTRIM) 69400-1.1 UNIT/ML-% ophthalmic solution Place 1-2 drops into the right eye every 4 hours 10 mL 0    prazosin (MINIPRESS) 2 MG capsule Take 1 capsule (2 mg) by mouth at bedtime. 90 capsule 2    prednisoLONE acetate (PRED FORTE) 1 % ophthalmic suspension       pregabalin (LYRICA) 25 MG capsule Take 1 capsule (25 mg) by mouth 2 times daily. 60 capsule 0    pregabalin (LYRICA) 75 MG capsule Take 1 capsule (75 mg) by mouth 2 times daily 60 capsule 2    QUEtiapine (SEROQUEL) 25 MG tablet Take 1-2 tablets (25-50 mg) by mouth daily as needed (anxiety). For agitation. 180 tablet 2    rosuvastatin (CRESTOR) 20 MG tablet TAKE 1 TABLET BY MOUTH DAILY 90 tablet 2    salmeterol (SEREVENT DISKUS) 50 MCG/ACT inhaler INHALE 1 PUFF INTO THE LUNGS TWICE A DAY 60 each 11    SENNA-TIME 8.6 MG tablet TAKE 2 TABLETS (17.2MG) BY MOUTH TWICE A  tablet 11    SUMAtriptan (IMITREX) 50 MG tablet Take 1 tablet (50 mg) by mouth at onset of headache for migraine May  repeat in 2 hours. Max 4 tablets/24 hours. 12 tablet 3    varenicline (CHANTIX) 1 MG tablet TAKE 1 TABLET BY MOUTH TWICE A DAY 56 tablet 0    warfarin ANTICOAGULANT (COUMADIN) 5 MG tablet TAKE 2 TABLETS BY MOUTH ON MON AND FRI ; TAKE 2 & 1/2 TABLETS BY MOUTH ALL OTHER DAYS AS DIRECTED BY INR CLINIC 56 tablet 11    zolpidem (AMBIEN) 5 MG tablet Take tablet by mouth 15 minutes prior to sleep, for Sleep Study 1 tablet 0       [place] Prescription Monitoring Program reviewed 09/23/2024.  No concern for abuse or misuse of controlled medications based on this report.  09/09/2024 Pregabalin 75mg 56 tabs for 28 days  09/09/2024 Briviact 100mg 84 tabs for 28 days  08/28/2024 Pregabalin 25mg 60 tabs for 30 days  08/12/2024 Briviact 100mg 84 tabs for 28 days  08/12/2024 Pregabalin 75mg 56 tabs for 28 days  07/15/024 Briviact 100mg 84 tabs for 28 days  07/15/2024 Pregabalin 75mg 56 tabs for 28 days.    Allergies: Pertinent allergies reviewed.     Allergies   Allergen Reactions    Aspirin Shortness Of Breath    Bees Anaphylaxis    Lamotrigine Dizziness and Unknown    Latex Itching    Phenobarbital      aggression    Vistaril [Hydroxyzine] Other (See Comments)     Sluggish, unable to wake up    Gabapentin Rash       Family History:   family history includes Alcoholism in her brother and sister; Chronic Obstructive Pulmonary Disease in her mother; Depression in her sister; Diabetes in her sister; Lung Cancer (age of onset: 76) in her maternal grandfather; No Known Problems in her sister; Other - See Comments in her brother and father; Skin Cancer in her father.    Social History:   Patient is  and lives in an apartment in [place].  She is independent in ADL's.  She has a cat.  She  reports that she has been smoking cigarettes. She has never been exposed to tobacco smoke. She has never used smokeless tobacco. She reports that she does not drink alcohol and does not use drugs.  Social History     Social History Narrative    Not  on file         Physical Exam:  LMP  (LMP Unknown)         Constitutional: She is oriented to person, place, and time.  She appears well-developed and well-nourished. She is not in acute distress. Morbidly obese.  HENT:     Head: Normocephalic and atraumatic.     Eyes: Pupils are equal, round, and reactive to light. EOM are normal. No scleral icterus.   Pulmonary/Chest:  NWOB. No respiratory distress.   Neurological: She is alert and oriented to person, place, and time. Coordination grossly normal.   Skin: Skin is warm and dry. She is not diaphoretic.   Psychiatric: She has a normal mood and affect. Her behavior is normal. Judgment and thought content normal.  Patient answers questions appropriately.  MSK: presents in a wheel chair        Imaging:  EXAM: CT CERVICAL SPINE W/O CONTRAST  LOCATION: [facility]  DATE: 7/24/2024     INDICATION: follow up possible cervical meningioma  COMPARISON: 7/5/2023. 1/8/2024.  TECHNIQUE: Routine CT Cervical Spine without IV contrast. Multiplanar reformats. Dose reduction techniques were used.     FINDINGS:  No evidence of cervical spine fracture. No evidence of prevertebral soft tissue swelling. Limited views the paraspinous soft tissues, mucosal spaces of the neck and lung apices are clear. Hypoplastic thyroid tissue presumably represents history of   possible thyroid ablation. This finding should be correlated with patient's clinical history.     Craniovertebral junction and C1-C2: Normal.     C2-C3: Right-sided facet degeneration narrows the right foramen. Spinal canal and left foramen are patent.      C3-C4: Broad-based central/right paracentral ventral osteophyte/disc complex attenuates the anterior/right subarachnoid space. Bilateral uncovertebral joint and facet hypertrophic change results in bilateral foraminal stenosis which is more severe on the   right. Mild thickening and ossification of the ligamentum flavum.     C4-C5: Degenerative change at  C4-C5 with ossification of the ligamentum flavum which results in subtle flattening of the posterior/right aspect of the dorsal subarachnoid space. Broad-based central ventral osteophyte/disc complex at this level attenuates   the anterior subarachnoid space. Bilateral uncovertebral joint and facet hypertrophic change results in bilateral foraminal stenosis.      C5-C6: Degenerative change at C5-C6 with broad-based central/left paracentral ventral osteophyte/disc complex results in contact and subtle flattening of the anterior/left aspect of the cervical cord with thickening of the ligamentum flavum which   attenuates the dorsal subarachnoid space. Bilateral uncovertebral joint and facet hypertrophic change result in severe bilateral foraminal stenosis.      C6-C7: Degenerative change at C6-C7 with broad-based central/left paracentral ventral osteophyte/disc complex which attenuates the anterior/left subarachnoid space. Bilateral uncovertebral joint hypertrophic change results in bilateral foraminal   stenosis.     C7-T1: Minimal central ventral osteophyte/disc complex attenuates the anterior subarachnoid space. Bilateral uncovertebral joint and facet hypertrophic change result in bilateral foraminal stenosis.                                                                       IMPRESSION:  1.  Degenerative change at C2-3 with right-sided foraminal narrowing.     2.  Degenerative change at C3-C4 with broad-based central/right paracentral ventral osteophyte/disc complex which attenuates the anterior/right subarachnoid space. Bilateral uncovertebral joint and facet hypertrophic change result in bilateral foraminal   stenosis which is more severe on the right. Mild thickening and ossification of the ligamentum flavum.     3.  Degenerative change at C4-5 with ossification of the ligamentum flavum which results in subtle flattening of the posterior/right aspect of the dorsal subarachnoid space. Broad-based central  ventral osteophyte/disc complex attenuates the anterior   subarachnoid space. Bilateral uncovertebral joint and facet hypertrophic change contribute to bilateral foraminal stenosis.     4.  Degenerative change at C5-6 with broad-based central/left paracentral ventral osteophyte/disc complex which results in contact and subtle flattening of the anterior/left aspect of the cervical cord with thickening of the ligamentum flavum which   attenuates the dorsal subarachnoid space. Bilateral uncovertebral joint and facet hypertrophic change resulting in severe bilateral foraminal stenosis.     5.  Degenerative change at C6-7 with broad-based central/left paracentral ventral osteophyte/disc complex which attenuates the anterior/left subarachnoid space. Bilateral foraminal stenosis.     6.  Mild degenerative change at C7-T1 with minimal central ventral osteophyte/disc complex which attenuates the anterior subarachnoid space. Bilateral uncovertebral joint and facet hypertrophic change result in bilateral foraminal stenosis.        EXAM: XR KNEE RIGHT 3 VIEWS  LOCATION: [facility]  DATE: 11/17/2023     INDICATION: Medial right knee pain.  COMPARISON: None.                                                                      IMPRESSION: No fracture. No degenerative changes. Small calcific density along the superomedial margin of the medial femoral condyle compatible with remote MCL injury.          DEXA was reviewed today.  IMPRESSION: Low bone density (OSTEOPENIA) with interval increase in BMD from the prior scan. T score meets the WHO criteria for low bone density (osteopenia) at one or more measured sites. The risk of osteoporotic fracture increases approximately two-fold for each standard deviation decrease in T-score.      EMG:  na      Diagnosis:  (M48.02) Cervical stenosis of spinal canal  (primary encounter diagnosis)  Comment:   Plan:     (M54.12) Cervical radiculopathy  Comment:   Plan:      (M54.81) Bilateral occipital neuralgia  Comment:   Plan:     (G89.29) Chronic intractable pain  Comment:   Plan:               Plan on 09/23/2024:  No medication changes today.    [facility] 10/02/2024    Follow-up in clinic in 2 months.      SHAHRZAD Hernandez, RN, CNP, FNP  [facility] [place]        BILLING TIME DOCUMENTATION:   The total TIME spent on this patient on the date of the encounter/appointment was 30 minutes.

## 2024-09-23 NOTE — PATIENT INSTRUCTIONS
Plan on 09/23/2024:  No medication changes today.    [facility] 10/02/2024    Follow-up in clinic in 2 months.      SHAHRZAD Hernandez, RN, CNP, FNP  [facility]        Clinic Number:  [phone]   Call with any questions about your care and for scheduling assistance.   Calls are returned Monday through Friday between 8 AM and 4:30 PM. We usually get back to you within 2 business days depending on the issue/request.    If we are prescribing your medications:  For opioid medication refills, call the clinic or send a [employer] message 7 days in advance.  Please include:  Name of requested medication  Name of the pharmacy.  For non-opioid medications, call your pharmacy directly to request a refill. Please allow 3-4 days to be processed.   Per [place] State Law:  All controlled substance prescriptions must be filled within 30 days of being written.    For those controlled substances allowing refills, pickup must occur within 30 days of last fill.      We believe regular attendance is key to your success in our program!    Any time you are unable to keep your appointment we ask that you call us at least 24 hours in advance to cancel.This will allow us to offer the appointment time to another patient.   Multiple missed appointments may lead to dismissal from the clinic.

## 2024-09-23 NOTE — TELEPHONE ENCOUNTER
Left message requesting patient callback Anticoagulation clinic to discuss hold plan.  Kyleigh Jimenes RN

## 2024-09-24 ENCOUNTER — HOSPITAL ENCOUNTER (OUTPATIENT)
Dept: CARDIAC REHAB | Facility: HOSPITAL | Age: 64
Discharge: HOME | End: 2024-09-24
Attending: INTERNAL MEDICINE
Payer: COMMERCIAL

## 2024-09-24 PROCEDURE — G0239 OTH RESP PROC, GROUP: HCPCS

## 2024-09-24 NOTE — PROGRESS NOTES
Calendar updated with the procedure plan hold and restart.    Kindra Garay RN    [facility]

## 2024-09-24 NOTE — TELEPHONE ENCOUNTER
Called and informed the patient of the procedure plan. Patient was able to repeat the instructions back.    Kindra Garay RN    [facility]ticoagulation [facility]

## 2024-09-25 ENCOUNTER — TELEPHONE (OUTPATIENT)
Dept: PALLIATIVE MEDICINE | Facility: OTHER | Age: 64
End: 2024-09-25
Payer: COMMERCIAL

## 2024-09-25 ENCOUNTER — TELEPHONE (OUTPATIENT)
Dept: ANTICOAGULATION | Facility: CLINIC | Age: 64
End: 2024-09-25
Payer: COMMERCIAL

## 2024-09-25 DIAGNOSIS — I26.99 PULMONARY EMBOLISM WITH INFARCTION (H): Primary | ICD-10-CM

## 2024-09-25 DIAGNOSIS — Z86.711 HISTORY OF PULMONARY EMBOLISM: ICD-10-CM

## 2024-09-25 DIAGNOSIS — Z79.01 LONG TERM (CURRENT) USE OF ANTICOAGULANTS: ICD-10-CM

## 2024-09-25 NOTE — TELEPHONE ENCOUNTER
Call from pt's home care nurse asking if pt should continue taking Lyrica bid or tid. Pt reports no dizziness when taking bid. Script is for bid. Advised that pt continue at that dose and call if any problems with side-effects.

## 2024-09-25 NOTE — TELEPHONE ENCOUNTER
Katelyn Home care nurse left a VM requesting call back to discuss upcoming procedure hold plan for coumadin.    Melissa Montenegro RN  Anticoagulation Nurse - [facility] [place]    CANDY procedure on 10/2/24:    9/26/24, Take last dose of warfarin     9/27/24 to 10/1/24, NO warfarin     10/2/24, DAY OF PROCEDURE, Restart warfarin in the evening, if okay with provider doing the procedure.    Make sure to ask the provider doing your procedure if it is okay to begin your warfarin as planned      On 10/2/24 & 10/3/24 take warfarin booster dose of 20 mg then resume current warfarin dose, 7.5 mg every Mon, Fri; 10 mg all other days     Recheck INR on 10/9/24 to ensure INR returning to goal range.    Katelyn informed via detailed VM.    Melissa Montenegro RN  Anticoagulation Nurse - [facility] [place]

## 2024-09-26 ENCOUNTER — MEDICAL CORRESPONDENCE (OUTPATIENT)
Dept: HEALTH INFORMATION MANAGEMENT | Facility: CLINIC | Age: 64
End: 2024-09-26
Payer: COMMERCIAL

## 2024-09-27 DIAGNOSIS — F17.210 CIGARETTE NICOTINE DEPENDENCE: ICD-10-CM

## 2024-09-27 RX ORDER — VARENICLINE TARTRATE 1 MG/1
1 TABLET, FILM COATED ORAL 2 TIMES DAILY
Qty: 56 TABLET | Refills: 0 | Status: SHIPPED | OUTPATIENT
Start: 2024-09-27

## 2024-09-29 ENCOUNTER — APPOINTMENT (OUTPATIENT)
Dept: CT IMAGING | Facility: CLINIC | Age: 64
End: 2024-09-29
Attending: EMERGENCY MEDICINE
Payer: COMMERCIAL

## 2024-09-29 ENCOUNTER — HOSPITAL ENCOUNTER (EMERGENCY)
Facility: CLINIC | Age: 64
Discharge: HOME | End: 2024-09-29
Attending: EMERGENCY MEDICINE | Admitting: EMERGENCY MEDICINE
Payer: COMMERCIAL

## 2024-09-29 ENCOUNTER — APPOINTMENT (OUTPATIENT)
Dept: RADIOLOGY | Facility: CLINIC | Age: 64
End: 2024-09-29
Attending: EMERGENCY MEDICINE
Payer: COMMERCIAL

## 2024-09-29 VITALS
OXYGEN SATURATION: 95 % | BODY MASS INDEX: 47.09 KG/M2 | DIASTOLIC BLOOD PRESSURE: 80 MMHG | TEMPERATURE: 98 F | HEART RATE: 73 BPM | RESPIRATION RATE: 20 BRPM | SYSTOLIC BLOOD PRESSURE: 167 MMHG | HEIGHT: 66 IN | WEIGHT: 293 LBS
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
## 2024-09-29 DIAGNOSIS — S80.01XA CONTUSION OF RIGHT KNEE, INITIAL ENCOUNTER: ICD-10-CM

## 2024-09-29 LAB
APTT PPP: 27 SECONDS (ref 22–38)
ERYTHROCYTE [DISTWIDTH] IN BLOOD BY AUTOMATED COUNT: 16.6 % (ref 10–15)
HCT VFR BLD AUTO: 35.1 % (ref 35–47)
HGB BLD-MCNC: 11.4 G/DL (ref 11.7–15.7)
HOLD SPECIMEN: NORMAL
INR PPP: 1.03 (ref 0.85–1.15)
MCH RBC QN AUTO: 25.2 PG (ref 26.5–33)
MCHC RBC AUTO-ENTMCNC: 32.5 G/DL (ref 31.5–36.5)
MCV RBC AUTO: 78 FL (ref 78–100)
PLATELET # BLD AUTO: 226 10E3/UL (ref 150–450)
RBC # BLD AUTO: 4.52 10E6/UL (ref 3.8–5.2)
WBC # BLD AUTO: 7.6 10E3/UL (ref 4–11)

## 2024-09-29 PROCEDURE — 250N000013 HC RX MED GY IP 250 OP 250 PS 637: Performed by: EMERGENCY MEDICINE

## 2024-09-29 PROCEDURE — 85730 THROMBOPLASTIN TIME PARTIAL: CPT | Performed by: EMERGENCY MEDICINE

## 2024-09-29 PROCEDURE — 85610 PROTHROMBIN TIME: CPT | Performed by: EMERGENCY MEDICINE

## 2024-09-29 PROCEDURE — 36415 COLL VENOUS BLD VENIPUNCTURE: CPT | Performed by: EMERGENCY MEDICINE

## 2024-09-29 PROCEDURE — 99285 EMERGENCY DEPT VISIT HI MDM: CPT | Mod: 25

## 2024-09-29 PROCEDURE — 70450 CT HEAD/BRAIN W/O DYE: CPT

## 2024-09-29 PROCEDURE — 73562 X-RAY EXAM OF KNEE 3: CPT | Mod: RT

## 2024-09-29 PROCEDURE — 72125 CT NECK SPINE W/O DYE: CPT

## 2024-09-29 PROCEDURE — 85027 COMPLETE CBC AUTOMATED: CPT | Performed by: EMERGENCY MEDICINE

## 2024-09-29 RX ORDER — ACETAMINOPHEN 325 MG/1
975 TABLET ORAL ONCE
Status: COMPLETED | OUTPATIENT
Start: 2024-09-29 | End: 2024-09-29

## 2024-09-29 RX ORDER — OXYCODONE HYDROCHLORIDE 5 MG/1
5 TABLET ORAL ONCE
Status: COMPLETED | OUTPATIENT
Start: 2024-09-29 | End: 2024-09-29

## 2024-09-29 RX ADMIN — OXYCODONE HYDROCHLORIDE 5 MG: 5 TABLET ORAL at 07:30

## 2024-09-29 RX ADMIN — ACETAMINOPHEN 975 MG: 325 TABLET ORAL at 07:30

## 2024-09-29 ASSESSMENT — COLUMBIA-SUICIDE SEVERITY RATING SCALE - C-SSRS
6. HAVE YOU EVER DONE ANYTHING, STARTED TO DO ANYTHING, OR PREPARED TO DO ANYTHING TO END YOUR LIFE?: YES
1. IN THE PAST MONTH, HAVE YOU WISHED YOU WERE DEAD OR WISHED YOU COULD GO TO SLEEP AND NOT WAKE UP?: NO
2. HAVE YOU ACTUALLY HAD ANY THOUGHTS OF KILLING YOURSELF IN THE PAST MONTH?: NO

## 2024-09-29 ASSESSMENT — ACTIVITIES OF DAILY LIVING (ADL)
ADLS_ACUITY_SCORE: 35
ADLS_ACUITY_SCORE: 35

## 2024-09-29 NOTE — DISCHARGE INSTRUCTIONS
INR is 1.03  Please stop taking the new medication you started for tremors if it is making you feel dizzy and re-discuss with your prescribing physician alternative treatments

## 2024-09-29 NOTE — ED PROVIDER NOTES
EMERGENCY DEPARTMENT ENCOUNTER      NAME: Valentina Olmedo  AGE: 64 year old female  YOB: 1960  MRN: [id number]  EVALUATION DATE & TIME: 2024  7:15 AM    PCP: Jose Maria Morin    ED PROVIDER: Rahel Lehman MD      Chief Complaint   Patient presents with    Fall    Head Injury    Knee Pain         FINAL IMPRESSION:  1. Fall, initial encounter    2. Contusion of right knee, initial encounter          ED COURSE & MEDICAL DECISION MAKIN:16 AM I met with the patient, obtained history, performed an initial exam, and discussed options and plan for diagnostics and treatment here in the ED.   8:33 AM We discussed plans for discharge including supportive cares, symptomatic treatment, outpatient follow up, and reasons to return to the emergency department.    Pertinent Labs & Imaging studies reviewed. (See chart for details)  64 year old female presents to the Emergency Department for evaluation of feeling unsteady and after a fall in the early morning while she was trying to go to the bathroom.  She reports that she fell forward, hit her right knee on the ground and then hit the back of her head on the wall.  She reports that most of her pain is in the right knee.  She reports that she is mostly wheelchair-bound.  She does live in a handicapped apartment and does not use stairs.  X-ray of her right knee is unremarkable.  She does have contusion to her right knee.  CT scan of her head and C-spine are unremarkable.  She is on warfarin but she has been holding it for a spinal procedure, she is therefore subtherapeutic at 1.03.  Plan to use a compression dressing on her right knee, recommend supportive management.    At the conclusion of the encounter I discussed the results of all of the tests and the disposition. The questions were answered. The patient or family acknowledged understanding and was agreeable with the care plan.       Medical Decision Making  Obtained supplemental  history:Supplemental history obtained?: No  Reviewed external records: External records reviewed?: Documented in chart and Inpatient Record: 06/15/2024 [facility] Med Surg Admission  Care impacted by chronic illness:Anticoagulated State, Hyperlipidemia, and Hypertension  Care significantly affected by social determinants of health:N/A  Did you consider but not order tests?: Work up considered but not performed and documented in chart, if applicable  Did you interpret images independently?: Independent interpretation of ECG and images noted in documentation, when applicable.  Consultation discussion with other provider:Did you involve another provider (consultant, , pharmacy, etc.)?: No  Discharge. No recommendations on prescription strength medication(s). I considered admission, but ultimately discharged patient after negative workup.      MEDICATIONS GIVEN IN THE EMERGENCY:  Medications   acetaminophen (TYLENOL) tablet 975 mg (975 mg Oral $Given 9/29/24 0730)   oxyCODONE (ROXICODONE) tablet 5 mg (5 mg Oral $Given 9/29/24 0730)       NEW PRESCRIPTIONS STARTED AT TODAY'S ER VISIT  New Prescriptions    No medications on file          =================================================================    HPI    Patient information was obtained from: the patient    Use of : N/A         Valentina Olmedo is a 64 year old female with a pertinent history of hypertension, DNR, hypothyroidism, long-term use of anticoagulants, idiopathic osteoporosis, hyperlipidemia and peripheral polyneuropathy who presents to this ED via EMS after taking a fall.     The patient was temporarily discontinued on her blood thinners for a neck surgery on 10/02. Her last INR was okay. She was also started a new medication on Friday (09/27) for tremors, and has been dizzy since then.    She woke up at 12:30 AM this morning (09/29) to use the bathroom when she became unsteady. She fell forward, with her right knee hitting the ground and her head  hitting the wall. Although she was able to ambulate initially, she is no longer able to bear weight on her right leg. Currently, she endorses mild nausea. The patient primarily uses a wheelchair to get around. She is unable to walk up stairs.    She otherwise denies loss of consciousness, headache or recent illness.    Per Chart Review, the patient was seen on 06/15/2024-06/16/2024 at [facility] after taking a fall. The patient fell and landed onto her right side. She reported right lower extremity pain after falling. CT scan with subluxation of the patella, which ortho felt was chronic. She had a hematoma to her lateral proximal calf with possible acute extravasation of contrast. An I&D of the right lower extremity and evacuation of clot was performed. The patient was initially high hypotensive, but this improved with bolus. Hemoglobin decreased by 2 since arrival to the hospital. She was transfused. Her INR corrected from 2.7 to 1.6. Held losartan 12.5 mg and doxazosin. She resumed pregabalin on 06/16. Upon discharge, the patient had a stable hemoglobin of 10.3 and white blood cell count of 7.8. She did have some urinary frequency, but this was not unusual for her.      PAST MEDICAL HISTORY:  Past Medical History:   Diagnosis Date    Adrenal mass (H) 10/12/2015    Chen Null MD  They were incidentally discovered on the CAT scan of the abdomen from December 2011 which was done for evaluation of kidney stones. F/up CT of the abdomen done on 6/26/12 showed a stable 5 mm nodular opacity in the right lower lung lobe, adjacent to the diagram. Bilateral adrenal masses average density measured less than 0 Hounsfield units, consistent with benign etio    Age-related cataract of both eyes, unspecified age-related cataract type 03/27/2024    Anxiety     Anxiety     Arthritis     Borderline personality disorder (H)     Cancer (H)     COPD (chronic obstructive pulmonary disease) (H)     Depression      Depressive disorder     History of COVID-19     Hyperlipidemia     Hypertension     Hypertension     Hyponatremia     Hypothyroidism     Malignant neoplasm of thyroid gland (H)     Chen Null MD  She had R hemithyroidectomy for a right neck tumor in 1994. According to the patient, the tumor was benign. She is not sure whether or not the tumor belonged to the thyroid gland. The surgery was done here at the [place]. In January 2011, she was diagnosed with kidney stones. She was found to have an elevated calcium level of 11.7, with a PTH level of 368. Stone an    Primary hyperparathyroidism (H)           PTSD (post-traumatic stress disorder)     Pulmonary embolism with infarction (H) 01/12/2021    Recurrent otitis media     Seizure disorder (H)     Sleep apnea     cpap at [facility]    Stroke (H)     Tobacco abuse     Vertigo        PAST SURGICAL HISTORY:  Past Surgical History:   Procedure Laterality Date    ABDOMEN SURGERY      ADRENALECTOMY Left     BRAIN SURGERY      COLONOSCOPY N/A 5/7/2024    Procedure: WITH COLONOSCOPY with polypectomies;  Surgeon: Walt Lira MD;  Location: [facility] OR    CRANIOTOMY FOR TEMPORAL LOBECTOMY Right     x3 for seizure    DILATION AND CURETTAGE      ESOPHAGOSCOPY, GASTROSCOPY, DUODENOSCOPY (EGD), COMBINED N/A 5/7/2024    Procedure: and biopsies;  Surgeon: Walt Lira MD;  Location: [facility] OR    ESOPHAGOSCOPY, GASTROSCOPY, DUODENOSCOPY (EGD), DILATATION, COMBINED N/A 5/7/2024    Procedure: ESOPHAGOGASTRODUODENOSCOPY with esophageal dilation;  Surgeon: Walt Lira MD;  Location: [facility] Main OR    HEAD & NECK SURGERY      HYSTERECTOMY, PAP NO LONGER INDICATED N/A     LITHOTRIPSY      PARATHYROID GLAND SURGERY      resection of adenoma    REFRACTIVE SURGERY Bilateral     RELEASE CARPAL TUNNEL Bilateral     TONSILLECTOMY & ADENOIDECTOMY      TOTAL THYROIDECTOMY      TOTAL VAGINAL HYSTERECTOMY      39 years old. Benign           CURRENT MEDICATIONS:     acetaminophen (TYLENOL) 500 MG tablet  albuterol (PROAIR HFA) 108 (90 Base) MCG/ACT inhaler  ARIPiprazole (ABILIFY) 15 MG tablet  azelastine (ASTELIN) 0.1 % nasal spray  benztropine (COGENTIN) 0.5 MG tablet  Brivaracetam (BRIVIACT) 100 MG tablet  carBAMazepine (TEGRETOL) 200 MG tablet  denosumab (PROLIA) 60 MG/ML SOSY injection  DULoxetine (CYMBALTA) 60 MG capsule  EPINEPHrine (ANY BX GENERIC EQUIV) 0.3 MG/0.3ML injection 2-pack  esomeprazole (NEXIUM) 40 MG DR capsule  fexofenadine (ALLEGRA) 180 MG tablet  fluticasone (FLONASE) 50 MCG/ACT nasal spray  fluticasone-salmeterol (ADVAIR-HFA) 230-21 MCG/ACT inhaler  GAVILYTE-G 236 g suspension  GEMTESA 75 MG TABS tablet  levothyroxine (SYNTHROID/LEVOTHROID) 300 MCG tablet  melatonin 3 MG tablet  multivitamin with iron (CHROMAGEN) TABS half-tab  nystatin (MYCOSTATIN) 737830 UNIT/GM external cream  polyethylene glycol (MIRALAX) 17 GM/Dose powder  polymixin b-trimethoprim (POLYTRIM) 46119-9.1 UNIT/ML-% ophthalmic solution  prazosin (MINIPRESS) 2 MG capsule  prednisoLONE acetate (PRED FORTE) 1 % ophthalmic suspension  pregabalin (LYRICA) 25 MG capsule  pregabalin (LYRICA) 75 MG capsule  QUEtiapine (SEROQUEL) 25 MG tablet  rosuvastatin (CRESTOR) 20 MG tablet  salmeterol (SEREVENT DISKUS) 50 MCG/ACT inhaler  SENNA-TIME 8.6 MG tablet  SUMAtriptan (IMITREX) 50 MG tablet  varenicline (CHANTIX) 1 MG tablet  warfarin ANTICOAGULANT (COUMADIN) 5 MG tablet  zolpidem (AMBIEN) 5 MG tablet        ALLERGIES:  Allergies   Allergen Reactions    Aspirin Shortness Of Breath    Bees Anaphylaxis    Lamotrigine Dizziness and Unknown    Latex Itching    Phenobarbital      aggression    Vistaril [Hydroxyzine] Other (See Comments)     Sluggish, unable to wake up    Gabapentin Rash       FAMILY HISTORY:  Family History   Problem Relation Age of Onset    Chronic Obstructive Pulmonary Disease Mother     Other - See Comments Father         estranged    Skin Cancer Father     Lung Cancer Maternal  "Grandfather 76    Other - See Comments Brother         Missing since 1992    Alcoholism Brother     Diabetes Sister     Depression Sister     Alcoholism Sister     No Known Problems Sister         Half sister       SOCIAL HISTORY:   Social History     Socioeconomic History    Marital status:    Tobacco Use    Smoking status: Some Days     Types: Cigarettes     Passive exposure: Never    Smokeless tobacco: Never    Tobacco comments:     Quit about 5 months ago on Chantix.     Vaping Use    Vaping status: Never Used   Substance and Sexual Activity    Alcohol use: No     Comment: None.    Drug use: No    Sexual activity: Not Currently     Partners: Male     Social Determinants of Health     Financial Resource Strain: High Risk (1/3/2024)    Financial Resource Strain     Within the past 12 months, have you or your family members you live with been unable to get utilities (heat, electricity) when it was really needed?: Yes   Food Insecurity: Food Insecurity Present (6/15/2024)    Received from [employer]    Hunger Vital Sign     Worried About Running Out of Food in the Last Year: Sometimes true     Ran Out of Food in the Last Year: Sometimes true   Transportation Needs: No Transportation Needs (6/15/2024)    Received from [employer]    PRAPARE - Transportation     Lack of Transportation (Medical): No     Lack of Transportation (Non-Medical): No   Interpersonal Safety: Unknown (6/15/2024)    Received from [employer]    Humiliation, Afraid, Rape, and Kick questionnaire     Physically Abused: No     Sexually Abused: No   Housing Stability: High Risk (6/15/2024)    Received from [employer]    Housing Stability Vital Sign     Unable to Pay for Housing in the Last Year: Yes     Unstable Housing in the Last Year: No       VITALS:  BP (!) 142/93   Pulse 76   Temp 98  F (36.7  C) (Oral)   Resp 20   Ht 1.676 m (5' 6\")   Wt (!) 174.6 kg (385 lb)   LMP  (LMP Unknown)   SpO2 96%   BMI 62.14 kg/m  "     PHYSICAL EXAM    Gen:  Alert, awake, NAD  HENT:  Head atraumatic, normocephalic.  PERRL.  EOMI.  No periorbital step-offs, depression, tenderness.  No tenderness along the zygomatic arch bilaterally.  Ears atraumatic with no external bleeding or signs of trauma.  No epistaxis.  Clear oropharynx.  Dentition intact.   Respiratory:  Normal respiratory rate.  Lungs CTA.  Chest wall stable to compression.  Nontender chest wall.   Trachea midline.  Cardiovascular:  Regular rate and rhythm.  Palpable radial and DP pulses bilaterally.  Abdomen:  Soft, nontender, normoactive bowel sounds.    Musculoskeletal:  No midline C-spine, T-spine, L-spine tenderness.  No midline spinal step-offs noted.  FROM in all extremities.  5/5 strength in all extremities.  No gross deformities noted.  Pelvis stable.  Tenderness to palpation and range of motion in the right knee. No abrasions, hematomas, lacerations noted.    Integument: Ecchymosis over the right superior knee and distal knee   Neuro:  GCS 15, A & O x 3, sensation intact to light touch       LAB:  All pertinent labs reviewed and interpreted.  Results for orders placed or performed during the hospital encounter of 09/29/24   Head CT w/o contrast    Impression    IMPRESSION:  HEAD CT:  1.  Unchanged exam with no acute intracranial abnormality.    2.  Redemonstrated right anterior temporal lobectomy with an overall unchanged appearance to the brain. This includes cerebellar parenchymal volume loss that appears disproportionately greater than the supratentorial volume loss. Differential   considerations as previously discussed still apply, with long-standing antiepileptic medication use a likely consideration given the constellation of findings and the patient's history of seizures.    CERVICAL SPINE CT:  1.  No CT evidence for acute fracture or post traumatic subluxation.  2.  Degenerative change as above. Spinal canal and neural foraminal stenosis are described above.   XR Knee  Right 3 Views    Impression    IMPRESSION: No fracture or effusion. Mild medial compartment narrowing.   CT Cervical Spine w/o Contrast    Impression    IMPRESSION:  HEAD CT:  1.  Unchanged exam with no acute intracranial abnormality.    2.  Redemonstrated right anterior temporal lobectomy with an overall unchanged appearance to the brain. This includes cerebellar parenchymal volume loss that appears disproportionately greater than the supratentorial volume loss. Differential   considerations as previously discussed still apply, with long-standing antiepileptic medication use a likely consideration given the constellation of findings and the patient's history of seizures.    CERVICAL SPINE CT:  1.  No CT evidence for acute fracture or post traumatic subluxation.  2.  Degenerative change as above. Spinal canal and neural foraminal stenosis are described above.   Result Value Ref Range    INR 1.03 0.85 - 1.15   Result Value Ref Range    aPTT 27 22 - 38 Seconds   CBC (+ platelets, no diff)   Result Value Ref Range    WBC Count 7.6 4.0 - 11.0 10e3/uL    RBC Count 4.52 3.80 - 5.20 10e6/uL    Hemoglobin 11.4 (L) 11.7 - 15.7 g/dL    Hematocrit 35.1 35.0 - 47.0 %    MCV 78 78 - 100 fL    MCH 25.2 (L) 26.5 - 33.0 pg    MCHC 32.5 31.5 - 36.5 g/dL    RDW 16.6 (H) 10.0 - 15.0 %    Platelet Count 226 150 - 450 10e3/uL       RADIOLOGY:  Reviewed all pertinent imaging. Please see official radiology report.  XR Knee Right 3 Views   Final Result   IMPRESSION: No fracture or effusion. Mild medial compartment narrowing.      CT Cervical Spine w/o Contrast   Final Result   IMPRESSION:   HEAD CT:   1.  Unchanged exam with no acute intracranial abnormality.      2.  Redemonstrated right anterior temporal lobectomy with an overall unchanged appearance to the brain. This includes cerebellar parenchymal volume loss that appears disproportionately greater than the supratentorial volume loss. Differential    considerations as previously  discussed still apply, with long-standing antiepileptic medication use a likely consideration given the constellation of findings and the patient's history of seizures.      CERVICAL SPINE CT:   1.  No CT evidence for acute fracture or post traumatic subluxation.   2.  Degenerative change as above. Spinal canal and neural foraminal stenosis are described above.      Head CT w/o contrast   Final Result   IMPRESSION:   HEAD CT:   1.  Unchanged exam with no acute intracranial abnormality.      2.  Redemonstrated right anterior temporal lobectomy with an overall unchanged appearance to the brain. This includes cerebellar parenchymal volume loss that appears disproportionately greater than the supratentorial volume loss. Differential    considerations as previously discussed still apply, with long-standing antiepileptic medication use a likely consideration given the constellation of findings and the patient's history of seizures.      CERVICAL SPINE CT:   1.  No CT evidence for acute fracture or post traumatic subluxation.   2.  Degenerative change as above. Spinal canal and neural foraminal stenosis are described above.          I, Marry Rosa, am serving as a scribe to document services personally performed by Rahel Lehman MD, based on my observation and the provider's statements to me. I, Rahel Lehman MD, attest that Marry Rosa is acting in a scribe capacity, has observed my performance of the services and has documented them in accordance with my direction.    Rahel Lehman MD  Emergency Medicine  [facility] EMERGENCY ROOM  [address] [id number] [phone]       Rahel Lehman MD  09/29/24 3507

## 2024-09-29 NOTE — ED TRIAGE NOTES
Pt presents to the ED via EMS. Pt had a fall where she hit her head and is on blood thinner. Trauma alert called. Pt started a new medication recently and has since become increasingly dizzy. Pt was getting up to the bathroom and got dizzy and fell forward onto her R knee.       Triage Assessment (Adult)       Row Name 09/29/24 0718          Triage Assessment    Airway WDL WDL        Respiratory WDL    Respiratory WDL WDL        Cardiac WDL    Cardiac WDL WDL        Peripheral/Neurovascular WDL    Peripheral Neurovascular WDL WDL        Cognitive/Neuro/Behavioral WDL    Cognitive/Neuro/Behavioral WDL WDL

## 2024-09-29 NOTE — ED NOTES
Ace wrap applied to R knee. Pt able to transfer to wheel chair with a walker. Reviewed discharge instructions and answered all questions.

## 2024-09-30 ENCOUNTER — DOCUMENTATION ONLY (OUTPATIENT)
Dept: ANTICOAGULATION | Facility: CLINIC | Age: 64
End: 2024-09-30
Payer: COMMERCIAL

## 2024-09-30 NOTE — PROGRESS NOTES
ANTICOAGULATION  MANAGEMENT: Discharge Review    Valentina Olmedo chart reviewed for anticoagulation continuity of care    Emergency room visit on 9/29/24 for fall, head injury, knee pain .    Discharge disposition: Home    Results:    Recent labs: (last 7 days)     09/29/24  0815   INR 1.03     Anticoagulation inpatient management:     not applicable     Anticoagulation discharge instructions:     Warfarin dosing:  prior to ED, pt had been holding coumadin for spinal procedure on 10/2. This plan continues.    Bridging: No   INR goal change: No      Medication changes affecting anticoagulation: No    Additional factors affecting anticoagulation: Yes: inflammation      PLAN     No adjustment to anticoagulation plan needed    Patient not contacted    No adjustment to Anticoagulation Calendar was required    Tosin Singh, RN  9/30/2024  Anticoagulation Clinic  [employer] [place] for routing messages: raquel MATHUR  [facility] patient phone line: [phone]

## 2024-10-01 ENCOUNTER — TELEPHONE (OUTPATIENT)
Dept: PSYCHIATRY | Facility: CLINIC | Age: 64
End: 2024-10-01

## 2024-10-01 ENCOUNTER — MYC MEDICAL ADVICE (OUTPATIENT)
Dept: PSYCHIATRY | Facility: CLINIC | Age: 64
End: 2024-10-01

## 2024-10-01 ENCOUNTER — ANTICOAGULATION THERAPY VISIT (OUTPATIENT)
Dept: ANTICOAGULATION | Facility: CLINIC | Age: 64
End: 2024-10-01

## 2024-10-01 DIAGNOSIS — Z86.711 HISTORY OF PULMONARY EMBOLISM: ICD-10-CM

## 2024-10-01 DIAGNOSIS — Z79.01 LONG TERM (CURRENT) USE OF ANTICOAGULANTS: ICD-10-CM

## 2024-10-01 DIAGNOSIS — I26.99 PULMONARY EMBOLISM WITH INFARCTION (H): Primary | ICD-10-CM

## 2024-10-01 NOTE — TELEPHONE ENCOUNTER
RN attempted to call Roselyn with [employer] back. Call went to  RN left a message to call [facility] back at [phone].     Per chart review. Patient lives with  in [facility] living and [facility] may be setting up her medications.     Will request TRAVIS as able.     Patient is to schedule next visit with a community provider in December upon Dr. Puente's departure on 10/29/24.     Awaiting return call.    Janet Hernandez RN on 10/1/2024 at 11:47 AM

## 2024-10-01 NOTE — TELEPHONE ENCOUNTER
"RN received a call back from Roselyn with [facility]. Roselyn sets up patients medications. She was with Valentina at the time and Valentina reports the following:    She started benztropine (COGENTIN) 0.5 MG tablets 9/17/2024 and about 2 days after starting, she began to experience dizziness.   She states the dizziness is \"all the time\". She feels it when she is lying down but get dizzy upon getting up.   She does report she waits a bit before taking steps but the dizziness makes her walk fast. She is using her walker but fell yesterday.   She states she wants Cogentin stopped right away.     RN encouraged safety especially use of walker at this immediate time. RN let patient know provider would be consulted regarding her request to discontinue and to review medications.     RN will contact Roselyn with [facility] back with plan of care. Roselyn agrees to watch for TRAVIS instructions being sent to Valentina via [employer].     Also need to send any medication change updates to Mis[facility].     Routing high priority to covering providers in Dr. Peunte's absence.    Note to Dr. Puente, patient has not yet scheduled with a community provider as she wants to follow Dr. Puente where ever she goes. She states she already asked Dr. Puente.     Janet Hernandez RN on 10/1/2024 at 12:06 PM        "

## 2024-10-01 NOTE — TELEPHONE ENCOUNTER
RN called [facility] and requested they discontinue the benztropine (COGENTIN) 0.5 MG tablets. This they did and it will not go in future med packs.     RN called Roselyn back with [facility] but had to leave a VM. RN left a message to call the clinic back to confirm she is aware of Cogentin discontinuation from patients med packs.     RN called patient back and let her know she no longer has to take the Cogentin. She conveyed understanding and will also reach out to Roselyn to update her medication packs. She states she knows which pill it is and will take it out herself is she needs to. RN did caution that many pills look alike and we will hopefully connect with Roselyn soon. RN let Valentina know that [facility] was also given the order to discontinue If Roselyn needs to confirm with them after clinic hours.    Janet Hernandez RN on 10/1/2024 at 3:26 PM

## 2024-10-01 NOTE — PROGRESS NOTES
ANTICOAGULATION MANAGEMENT     Valentina Olmedo 64 year old female is on warfarin.    No INR drawn today    ASSESSMENT     Source(s): Chart Review and Home Care/Facility Nurse     Warfarin doses taken: Patient has been holding warfarin since 9/27/24 because she was suppose to have an CANDY injection on 10/2/24.  Diet: No new diet changes identified  Medication/supplement changes: Oxycodone PRN for pain.  New illness, injury, or hospitalization: Yes: Patient was seen in the ER on 9/29/24 and 9/30/24 for falls. Due to recent falls, patient has decided to reschedule CANDY at a later date.   Signs or symptoms of bleeding or clotting: yes: small hematoma on right knee from recent falls that seems to be improving. Patient will continue to monitor and report any concerning symptoms.   Previous result: Therapeutic last 2(+) visits  Additional findings: None       PLAN     Recommended plan for no diet, medication or health factor changes affecting INR     Dosing Instructions: Continue your current warfarin dose with next INR in 1 week       Summary  As of 10/1/2024      Full warfarin instructions:  7.5 mg every Mon, Fri; 10 mg all other days   Next INR check:  10/8/2024               Detailed voice message left for Roselyn home care nurse with dosing instructions and follow up date.     Orders given to  Homecare nurse/facility to recheck    Education provided: Please call back if any changes to your diet, medications or how you've been taking warfarin  Symptom monitoring: monitoring for bleeding signs and symptoms, monitoring for clotting signs and symptoms, monitoring for stroke signs and symptoms, and when to seek medical attention/emergency care    Plan made with [facility] Pharmacist Paula Cheng RN  10/1/2024  Anticoagulation Clinic  [facility] for routing messages: raquel MATHUR  [facility] patient phone line: [phone]        _______________________________________________________________________      Anticoagulation Episode Summary       Current INR goal:  2.0-3.0   TTR:  57.1% (1 y)   Target end date:  Indefinite   Send INR reminders to:  PIPO MATHUR    Indications    History of pulmonary embolism (Resolved) [Z86.711]  Pulmonary embolism with infarction (H) [I26.99]  Long term (current) use of anticoagulants [Z79.01]  History of pulmonary embolism [Z86.711]             Comments:               Anticoagulation Care Providers       Provider Role Specialty Phone number    Promise Vanegas MD Referring Family Medicine [phone]    Donald Rooney MD Referring Internal Medicine [phone]    Jose Maria Morin MD Referring Family Medicine [phone]

## 2024-10-01 NOTE — TELEPHONE ENCOUNTER
10/1/24: Reason for call:  Other   Patient called regarding (reason for call): call back  Additional comments: RN at [facility] would like a call back to discuss side effects pt is having from benztropine (COGENTIN) 0.5 MG tablet, as well as a fall she had recently, resulting from dizziness. Pt provided verbal consent to communicate with [facility], updated in chart.    Phone number to reach patient:  Other phone number:  [phone] Roselyn JOHNSON at [facility]    Best Time:  ASAP    Can we leave a detailed message on this number?  YES    Travel screening: Not Applicable

## 2024-10-01 NOTE — TELEPHONE ENCOUNTER
Sent instructions via [employer] for completing TRAVIS for [facility].     Janet Hernandez RN on 10/1/2024 at 12:08 PM

## 2024-10-07 ENCOUNTER — MEDICAL CORRESPONDENCE (OUTPATIENT)
Dept: HEALTH INFORMATION MANAGEMENT | Facility: CLINIC | Age: 64
End: 2024-10-07
Payer: COMMERCIAL

## 2024-10-07 DIAGNOSIS — Z53.9 DIAGNOSIS NOT YET DEFINED: Primary | ICD-10-CM

## 2024-10-07 PROCEDURE — G0179 MD RECERTIFICATION HHA PT: HCPCS | Performed by: FAMILY MEDICINE

## 2024-10-08 ENCOUNTER — ANTICOAGULATION THERAPY VISIT (OUTPATIENT)
Dept: ANTICOAGULATION | Facility: CLINIC | Age: 64
End: 2024-10-08
Payer: COMMERCIAL

## 2024-10-08 DIAGNOSIS — Z79.01 LONG TERM (CURRENT) USE OF ANTICOAGULANTS: ICD-10-CM

## 2024-10-08 DIAGNOSIS — I26.99 PULMONARY EMBOLISM WITH INFARCTION (H): Primary | ICD-10-CM

## 2024-10-08 DIAGNOSIS — Z86.711 HISTORY OF PULMONARY EMBOLISM: ICD-10-CM

## 2024-10-08 LAB — INR (EXTERNAL): 2 (ref 0.9–1.1)

## 2024-10-08 NOTE — PROGRESS NOTES
ANTICOAGULATION MANAGEMENT     Valentina Olmedo 64 year old female is on warfarin with therapeutic INR result. (Goal INR 2.0-3.0)    Recent labs: (last 7 days)     10/08/24  1219   INR 2.0*       ASSESSMENT     Source(s): Chart Review and Home Care/Facility Nurse     Warfarin doses taken: Warfarin taken as instructed  Diet: No new diet changes identified  Medication/supplement changes: None noted  New illness, injury, or hospitalization: No  Signs or symptoms of bleeding or clotting: No  Previous result: Subtherapeutic  Additional findings: None       PLAN     Recommended plan for no diet, medication or health factor changes affecting INR     Dosing Instructions: Continue your current warfarin dose with next INR in 1 week , plus or minus 1 day per home care scheduling      Summary  As of 10/8/2024      Full warfarin instructions:  7.5 mg every Mon, Fri; 10 mg all other days   Next INR check:  10/15/2024               Telephone call with Katelyn home care nurse who agrees to plan and repeated back plan correctly    Orders given to  Homecare nurse/facility to recheck    Education provided: Please call back if any changes to your diet, medications or how you've been taking warfarin  Goal range and lab monitoring: goal range and significance of current result    Plan made per [facility] anticoagulation protocol    Kristin Shah RN  10/8/2024  Anticoagulation Clinic  [facility] for routing messages: raquel MATHUR  [facility] patient phone line: [phone]        _______________________________________________________________________     Anticoagulation Episode Summary       Current INR goal:  2.0-3.0   TTR:  55.1% (1 y)   Target end date:  Indefinite   Send INR reminders to:  PIPO MATHUR    Indications    History of pulmonary embolism (Resolved) [Z86.711]  Pulmonary embolism with infarction (H) [I26.99]  Long term (current) use of anticoagulants [Z79.01]  History of pulmonary embolism [Z86.711]             Comments:                Anticoagulation Care Providers       Provider Role Specialty Phone number    Promise Vanegas MD Referring Family Medicine [phone]    Donald Rooney MD Referring Internal Medicine [phone]    Jose Maria Morin MD Referring Family Medicine [phone]

## 2024-10-09 ENCOUNTER — MEDICAL CORRESPONDENCE (OUTPATIENT)
Dept: HEALTH INFORMATION MANAGEMENT | Facility: CLINIC | Age: 64
End: 2024-10-09

## 2024-10-09 ENCOUNTER — OFFICE VISIT (OUTPATIENT)
Dept: FAMILY MEDICINE | Facility: CLINIC | Age: 64
End: 2024-10-09
Payer: COMMERCIAL

## 2024-10-09 VITALS
SYSTOLIC BLOOD PRESSURE: 132 MMHG | TEMPERATURE: 98 F | HEART RATE: 90 BPM | RESPIRATION RATE: 16 BRPM | HEIGHT: 66 IN | BODY MASS INDEX: 62.14 KG/M2 | DIASTOLIC BLOOD PRESSURE: 82 MMHG | OXYGEN SATURATION: 96 %

## 2024-10-09 DIAGNOSIS — G62.0 POLYNEUROPATHY DUE TO DRUG (H): ICD-10-CM

## 2024-10-09 DIAGNOSIS — S89.91XS RIGHT KNEE INJURY, SEQUELA: Primary | ICD-10-CM

## 2024-10-09 DIAGNOSIS — F33.1 MAJOR DEPRESSIVE DISORDER, RECURRENT EPISODE, MODERATE (H): ICD-10-CM

## 2024-10-09 PROCEDURE — 99214 OFFICE O/P EST MOD 30 MIN: CPT | Performed by: PHYSICIAN ASSISTANT

## 2024-10-09 PROCEDURE — G2211 COMPLEX E/M VISIT ADD ON: HCPCS | Performed by: PHYSICIAN ASSISTANT

## 2024-10-09 PROCEDURE — 96127 BRIEF EMOTIONAL/BEHAV ASSMT: CPT | Performed by: PHYSICIAN ASSISTANT

## 2024-10-09 ASSESSMENT — PATIENT HEALTH QUESTIONNAIRE - PHQ9
10. IF YOU CHECKED OFF ANY PROBLEMS, HOW DIFFICULT HAVE THESE PROBLEMS MADE IT FOR YOU TO DO YOUR WORK, TAKE CARE OF THINGS AT HOME, OR GET ALONG WITH OTHER PEOPLE: SOMEWHAT DIFFICULT
SUM OF ALL RESPONSES TO PHQ QUESTIONS 1-9: 16
SUM OF ALL RESPONSES TO PHQ QUESTIONS 1-9: 16

## 2024-10-09 ASSESSMENT — ENCOUNTER SYMPTOMS: HEADACHES: 1

## 2024-10-09 ASSESSMENT — PAIN SCALES - GENERAL: PAINLEVEL: WORST PAIN (10)

## 2024-10-09 NOTE — ASSESSMENT & PLAN NOTE
She was evaluated in emergency room on 9/29/2024 and again on 9/30/24 following at a fall onto her right knee.  She was using the walker at that time.  She had x-rays of the cervical spine which was negative for fracture.  Head CT was also normal.  X-ray of the knee was also negative.  At that time she was concerned enough that she wanted admission to the hospital.  After discussion she elected to go home with her .  They have a walker and scooter at home to use.  She is on warfarin.    Today she report the nee pain has continued.  She has swelling and hematoma of her knee.  She is using a walker, wheelchair and scooter she is using at home.   is with her today.  He feels that she has had some increasing difficulty with walking following her fall.  He is able to get her in and out of bed and on and off the toilet but it takes longer than usual.  He feels that he can continue to take care of her at home.  She is very concerned about not wanting to go to a nursing home.  Therapy emotional today about her continued knee pain.  Referral to orthopedics was completed today.  In the meantime I would like her to continue to use ice 20 minutes 3 times a day along with continued use of Tylenol arthritis routinely.  Unable to take NSAIDs due to use of warfarin.    X-ray knee:   IMPRESSION: Minimal hypertrophic changes with no acute bony finding such as fracture or dislocation identified. Mild effusion versus synovitis. Normal variant fabella.     HEAD CT:  1.  Unchanged exam with no acute intracranial abnormality.     2.  Redemonstrated right anterior temporal lobectomy with an overall unchanged appearance to the brain. This includes cerebellar parenchymal volume loss that appears disproportionately greater than the supratentorial volume loss. Differential   considerations as previously discussed still apply, with long-standing antiepileptic medication use a likely consideration given the constellation of  findings and the patient's history of seizures.     CERVICAL SPINE CT:  1.  No CT evidence for acute fracture or post traumatic subluxation.  2.  Degenerative change as above. Spinal canal and neural foraminal stenosis are described above.

## 2024-10-09 NOTE — PROGRESS NOTES
The longitudinal plan of care for the diagnosis(es)/condition(s) as documented were addressed during this visit. Due to the added complexity in care, I will continue to support Valentina in the subsequent management and with ongoing continuity of care.    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  No change in medication at this time.         Major depressive disorder, recurrent episode, moderate (H)     Symptoms remain stable Abilify duloxetine and quetiapine.         Relevant Medications    melatonin 3 MG tablet    Right knee injury, sequela - Primary     She was evaluated in emergency room on 9/29/2024 and again on 9/30/24 following at a fall onto her right knee.  She was using the walker at that time.  She had x-rays of the cervical spine which was negative for fracture.  Head CT was also normal.  X-ray of the knee was also negative.  At that time she was concerned enough that she wanted admission to the hospital.  After discussion she elected to go home with her .  They have a walker and scooter at home to use.  She is on warfarin.    Today she report the nee pain has continued.  She has swelling and hematoma of her knee.  She is using a walker, wheelchair and scooter she is using at home.   is with her today.  He feels that she has had some increasing difficulty with walking following her fall.  He is able to get her in and out of bed and on and off the toilet but it takes longer than usual.  He feels that he can continue to take care of her at home.  She is very concerned about not wanting to go to a nursing home.  Therapy emotional today about her continued knee pain.  Referral to orthopedics was completed today.  In the meantime I would like her to continue to use ice 20 minutes 3 times a day along with continued use of Tylenol arthritis routinely.  Unable to take NSAIDs due to use of  "warfarin.    X-ray knee:   IMPRESSION: Minimal hypertrophic changes with no acute bony finding such as fracture or dislocation identified. Mild effusion versus synovitis. Normal variant fabella.     HEAD CT:  1.  Unchanged exam with no acute intracranial abnormality.     2.  Redemonstrated right anterior temporal lobectomy with an overall unchanged appearance to the brain. This includes cerebellar parenchymal volume loss that appears disproportionately greater than the supratentorial volume loss. Differential   considerations as previously discussed still apply, with long-standing antiepileptic medication use a likely consideration given the constellation of findings and the patient's history of seizures.     CERVICAL SPINE CT:  1.  No CT evidence for acute fracture or post traumatic subluxation.  2.  Degenerative change as above. Spinal canal and neural foraminal stenosis are described above.         Relevant Orders    Orthopedic  Referral       BMI  Estimated body mass index is 62.14 kg/m  as calculated from the following:    Height as of this encounter: 1.676 m (5' 6\").    Weight as of 9/29/24: 174.6 kg (385 lb).       Depression Screening Follow Up        10/9/2024     2:46 PM   PHQ   PHQ-9 Total Score 16   Q9: Thoughts of better off dead/self-harm past 2 weeks More than half the days   F/U: Thoughts of suicide or self-harm No   F/U: Safety concerns Yes       956}          Follow Up Actions Taken  To assessment and plan under depression.    Discussed the following ways the patient can remain in a safe environment: Lives with her .  He feels that she is safe at home.        Magda Montgomery is a 64 year old, presenting for the following health issues:  Knee Pain (Right knee. She fell one week ago per patient. Went to the Emergency Room the next day.), Referral (Request a referral for a knee MRI), and Headache (Since the fall per patient; stated she hit her head slightly)        10/9/2024     2:48 " "PM   Additional Questions   Roomed by Emerita TRAN   Accompanied by Jass-      History of Present Illness       Reason for visit:  Right knee  Symptom onset:  1-2 weeks ago   She is taking medications regularly.       Objective    /82 (BP Location: Left arm, Patient Position: Sitting, Cuff Size: Adult Large)   Pulse 90   Temp 98  F (36.7  C) (Oral)   Resp 16   Ht 1.676 m (5' 6\")   LMP  (LMP Unknown)   SpO2 96%   BMI 62.14 kg/m    Body mass index is 62.14 kg/m .  Physical Exam  Vitals and nursing note reviewed.   Constitutional:       Appearance: Normal appearance.   Musculoskeletal:      Comments: Exam of her right knee there is a moderate-sized effusion with purple ink on the anterior knee.  She has normal flexion and extension of the knee.  Exam is limited today due to pain.  She is sitting in a wheelchair.  Per reports she is able to bear weight on her leg.   Neurological:      Mental Status: She is alert.             Signed Electronically by: Corinne Lebron PA-C    "

## 2024-10-11 ENCOUNTER — TELEPHONE (OUTPATIENT)
Dept: PULMONOLOGY | Facility: CLINIC | Age: 64
End: 2024-10-11
Payer: COMMERCIAL

## 2024-10-11 NOTE — TELEPHONE ENCOUNTER
[facility] Call Center    Phone Message    May a detailed message be left on voicemail: no     Reason for Call: Other: pharmacy called to clarify two medication orders. They would like a call back.  salmeterol (SEREVENT DISKUS) 50 MCG/ACT inhaler and fluticasone-salmeterol (ADVAIR-HFA) 230-21 MCG/ACT inhaler for [place] pharmacy [phone] and they wanted to make sure they were to switch or to use one as there is a same ingredient in the inhalers and please call back and advise.     Action Taken: Message routed to:  Other: pulm    Travel Screening: Not Applicable     Date of Service: 10/11/24

## 2024-10-11 NOTE — TELEPHONE ENCOUNTER
Spoke with pharmacy ([facility]).  They will discontinue the Serevent and  fill the Advair for the patient.  Verified with Dr. Mallory in clinic today.

## 2024-10-15 ENCOUNTER — ANTICOAGULATION THERAPY VISIT (OUTPATIENT)
Dept: ANTICOAGULATION | Facility: CLINIC | Age: 64
End: 2024-10-15
Payer: COMMERCIAL

## 2024-10-15 DIAGNOSIS — Z86.711 HISTORY OF PULMONARY EMBOLISM: ICD-10-CM

## 2024-10-15 DIAGNOSIS — I26.99 PULMONARY EMBOLISM WITH INFARCTION (H): Primary | ICD-10-CM

## 2024-10-15 DIAGNOSIS — Z79.01 LONG TERM (CURRENT) USE OF ANTICOAGULANTS: ICD-10-CM

## 2024-10-15 LAB — INR (EXTERNAL): 2.6 (ref 0.9–1.1)

## 2024-10-15 NOTE — PROGRESS NOTES
ANTICOAGULATION MANAGEMENT     Valentina Olmedo 64 year old female is on warfarin with therapeutic INR result. (Goal INR 2.0-3.0)    Recent labs: (last 7 days)     10/15/24  0000   INR 2.6*       ASSESSMENT     Source(s): Chart Review and Home Care/Facility Nurse [facility]la Zepeda  [phone]    Warfarin doses taken: Warfarin taken as instructed  Diet: No new diet changes identified  Medication/supplement changes: None noted  New illness, injury, or hospitalization: No  Signs or symptoms of bleeding or clotting: No  Previous result: Therapeutic last visit; previously outside of goal range  Additional findings: None       PLAN     Recommended plan for no diet, medication or health factor changes affecting INR     Dosing Instructions: Continue your current warfarin dose with next INR in 1 week       Summary  As of 10/15/2024      Full warfarin instructions:  7.5 mg every Mon, Fri; 10 mg all other days   Next INR check:  10/22/2024               Telephone call with [facility] home care nurse who agrees to plan and repeated back plan correctly    Orders given to  Homecare nurse/facility to recheck    Education provided: Please call back if any changes to your diet, medications or how you've been taking warfarin    Plan made per [facility] anticoagulation protocol    Tosin Singh, RN  10/15/2024  Anticoagulation Clinic  [facility] for routing messages: raquel MATHUR  [facility] patient phone line: [phone]        _______________________________________________________________________     Anticoagulation Episode Summary       Current INR goal:  2.0-3.0   TTR:  56.6% (1 y)   Target end date:  Indefinite   Send INR reminders to:  PIPO MATHUR    Indications    History of pulmonary embolism (Resolved) [Z86.711]  Pulmonary embolism with infarction (H) [I26.99]  Long term (current) use of anticoagulants [Z79.01]  History of pulmonary embolism [Z86.711]             Comments:               Anticoagulation Care Providers        Provider Role Specialty Phone number    Promise Vanegas MD Referring Family Medicine [phone]    Donald Rooney MD Referring Internal Medicine [phone]    Jose Maria Morin MD Referring Family Medicine [phone]

## 2024-10-16 ENCOUNTER — MEDICAL CORRESPONDENCE (OUTPATIENT)
Dept: HEALTH INFORMATION MANAGEMENT | Facility: CLINIC | Age: 64
End: 2024-10-16
Payer: COMMERCIAL

## 2024-10-23 ENCOUNTER — ANTICOAGULATION THERAPY VISIT (OUTPATIENT)
Dept: ANTICOAGULATION | Facility: CLINIC | Age: 64
End: 2024-10-23
Payer: COMMERCIAL

## 2024-10-23 DIAGNOSIS — N18.32 STAGE 3B CHRONIC KIDNEY DISEASE (H): ICD-10-CM

## 2024-10-23 DIAGNOSIS — I26.99 PULMONARY EMBOLISM WITH INFARCTION (H): Primary | ICD-10-CM

## 2024-10-23 DIAGNOSIS — Z86.711 HISTORY OF PULMONARY EMBOLISM: ICD-10-CM

## 2024-10-23 DIAGNOSIS — M81.0 AGE-RELATED OSTEOPOROSIS WITHOUT CURRENT PATHOLOGICAL FRACTURE: Primary | ICD-10-CM

## 2024-10-23 DIAGNOSIS — Z79.01 LONG TERM (CURRENT) USE OF ANTICOAGULANTS: ICD-10-CM

## 2024-10-23 LAB — INR (EXTERNAL): 3.4

## 2024-10-23 NOTE — PROGRESS NOTES
ANTICOAGULATION MANAGEMENT     Valentina Olmedo 64 year old female is on warfarin with supratherapeutic INR result. (Goal INR 2.0-3.0)    Recent labs: (last 7 days)     10/23/24  0900   INR 3.4       ASSESSMENT     Source(s): Chart Review and Home Care/Facility Nurse     Warfarin doses taken: Warfarin taken as instructed  Diet: No new diet changes identified  Medication/supplement changes:  Continues to take tylenol 650 mg  tablets bid for about 3 weeks now maybe increasing inr today  New illness, injury, or hospitalization: No  Signs or symptoms of bleeding or clotting: No  Previous result: Therapeutic last 2(+) visits inr noted to be trending up  Additional findings:  Given okay to check inr plus, minus 1 day.        PLAN     Recommended plan for ongoing change(s) affecting INR     Dosing Instructions: decrease your warfarin dose (3.8% change) with next INR in 1 week       Summary  As of 10/23/2024      Full warfarin instructions:  7.5 mg every Mon, Wed, Fri; 10 mg all other days   Next INR check:  10/30/2024               Telephone call with [facility] home care nurse who verbalizes understanding and agrees to plan and who agrees to plan and repeated back plan correctly    Orders given to  Homecare nurse/facility to recheck    Education provided: Contact [phone] with any changes, questions or concerns.     Plan made per [facility] anticoagulation protocol    Zari Maria RN  10/23/2024  Anticoagulation Clinic  [facility] for routing messages: raquel MATHUR  [facility] patient phone line: [phone]        _______________________________________________________________________     Anticoagulation Episode Summary       Current INR goal:  2.0-3.0   TTR:  55.9% (1 y)   Target end date:  Indefinite   Send INR reminders to:  PIPO MATHUR    Indications    History of pulmonary embolism (Resolved) [Z86.711]  Pulmonary embolism with infarction (H) [I26.99]  Long term (current) use of anticoagulants [Z79.01]  History of  pulmonary embolism [Z86.711]             Comments:  --             Anticoagulation Care Providers       Provider Role Specialty Phone number    Promise Vanegas MD Referring Family Medicine [phone]    Donald Rooney MD Referring Internal Medicine [phone]    Jose Maria Morin MD Referring Family Medicine [phone]

## 2024-10-24 ENCOUNTER — MEDICAL CORRESPONDENCE (OUTPATIENT)
Dept: HEALTH INFORMATION MANAGEMENT | Facility: CLINIC | Age: 64
End: 2024-10-24
Payer: COMMERCIAL

## 2024-10-24 DIAGNOSIS — M81.0 AGE-RELATED OSTEOPOROSIS WITHOUT CURRENT PATHOLOGICAL FRACTURE: Primary | ICD-10-CM

## 2024-10-25 ENCOUNTER — OFFICE VISIT (OUTPATIENT)
Dept: FAMILY MEDICINE | Facility: CLINIC | Age: 64
End: 2024-10-25
Payer: COMMERCIAL

## 2024-10-25 VITALS
DIASTOLIC BLOOD PRESSURE: 83 MMHG | HEIGHT: 66 IN | HEART RATE: 90 BPM | TEMPERATURE: 97.9 F | WEIGHT: 293 LBS | OXYGEN SATURATION: 92 % | SYSTOLIC BLOOD PRESSURE: 137 MMHG | RESPIRATION RATE: 16 BRPM | BODY MASS INDEX: 47.09 KG/M2

## 2024-10-25 DIAGNOSIS — R30.0 DYSURIA: ICD-10-CM

## 2024-10-25 DIAGNOSIS — R35.89 POLYURIA: ICD-10-CM

## 2024-10-25 DIAGNOSIS — M48.02 CERVICAL STENOSIS OF SPINAL CANAL: ICD-10-CM

## 2024-10-25 DIAGNOSIS — S80.11XA HEMATOMA OF RIGHT LOWER LEG: ICD-10-CM

## 2024-10-25 DIAGNOSIS — F17.210 CIGARETTE NICOTINE DEPENDENCE: ICD-10-CM

## 2024-10-25 DIAGNOSIS — R73.09 OTHER ABNORMAL GLUCOSE: ICD-10-CM

## 2024-10-25 DIAGNOSIS — M81.0 AGE-RELATED OSTEOPOROSIS WITHOUT CURRENT PATHOLOGICAL FRACTURE: ICD-10-CM

## 2024-10-25 DIAGNOSIS — S89.91XS RIGHT KNEE INJURY, SEQUELA: ICD-10-CM

## 2024-10-25 DIAGNOSIS — M25.561 ACUTE PAIN OF RIGHT KNEE: Primary | ICD-10-CM

## 2024-10-25 DIAGNOSIS — Z09 HOSPITAL DISCHARGE FOLLOW-UP: ICD-10-CM

## 2024-10-25 LAB
ALBUMIN UR-MCNC: ABNORMAL MG/DL
APPEARANCE UR: ABNORMAL
BACTERIA #/AREA URNS HPF: ABNORMAL /HPF
BILIRUB UR QL STRIP: ABNORMAL
COLOR UR AUTO: YELLOW
ERYTHROCYTE [DISTWIDTH] IN BLOOD BY AUTOMATED COUNT: 16.8 % (ref 10–15)
EST. AVERAGE GLUCOSE BLD GHB EST-MCNC: 108 MG/DL
GLUCOSE UR STRIP-MCNC: NEGATIVE MG/DL
HBA1C MFR BLD: 5.4 % (ref 0–5.6)
HCT VFR BLD AUTO: 39.2 % (ref 35–47)
HGB BLD-MCNC: 12.4 G/DL (ref 11.7–15.7)
HGB UR QL STRIP: ABNORMAL
KETONES UR STRIP-MCNC: ABNORMAL MG/DL
LEUKOCYTE ESTERASE UR QL STRIP: ABNORMAL
MCH RBC QN AUTO: 25.4 PG (ref 26.5–33)
MCHC RBC AUTO-ENTMCNC: 31.6 G/DL (ref 31.5–36.5)
MCV RBC AUTO: 80 FL (ref 78–100)
NITRATE UR QL: NEGATIVE
PH UR STRIP: 5 [PH] (ref 5–8)
PLATELET # BLD AUTO: 218 10E3/UL (ref 150–450)
RBC # BLD AUTO: 4.89 10E6/UL (ref 3.8–5.2)
RBC #/AREA URNS AUTO: ABNORMAL /HPF
SP GR UR STRIP: 1.02 (ref 1–1.03)
SQUAMOUS #/AREA URNS AUTO: ABNORMAL /LPF
UROBILINOGEN UR STRIP-ACNC: 0.2 E.U./DL
WBC # BLD AUTO: 4 10E3/UL (ref 4–11)
WBC #/AREA URNS AUTO: ABNORMAL /HPF

## 2024-10-25 PROCEDURE — 80048 BASIC METABOLIC PNL TOTAL CA: CPT | Performed by: FAMILY MEDICINE

## 2024-10-25 PROCEDURE — 36415 COLL VENOUS BLD VENIPUNCTURE: CPT | Performed by: FAMILY MEDICINE

## 2024-10-25 PROCEDURE — G2211 COMPLEX E/M VISIT ADD ON: HCPCS | Performed by: FAMILY MEDICINE

## 2024-10-25 PROCEDURE — 83036 HEMOGLOBIN GLYCOSYLATED A1C: CPT | Mod: GZ | Performed by: FAMILY MEDICINE

## 2024-10-25 PROCEDURE — 99214 OFFICE O/P EST MOD 30 MIN: CPT | Performed by: FAMILY MEDICINE

## 2024-10-25 PROCEDURE — 81001 URINALYSIS AUTO W/SCOPE: CPT | Performed by: FAMILY MEDICINE

## 2024-10-25 PROCEDURE — 85027 COMPLETE CBC AUTOMATED: CPT | Performed by: FAMILY MEDICINE

## 2024-10-25 PROCEDURE — 87086 URINE CULTURE/COLONY COUNT: CPT | Performed by: FAMILY MEDICINE

## 2024-10-25 RX ORDER — ACETAMINOPHEN 500 MG
500-1000 TABLET ORAL EVERY 8 HOURS PRN
Qty: 180 TABLET | Refills: 1 | Status: SHIPPED | OUTPATIENT
Start: 2024-10-25

## 2024-10-25 RX ORDER — VARENICLINE TARTRATE 1 MG/1
1 TABLET, FILM COATED ORAL 2 TIMES DAILY
Qty: 56 TABLET | Refills: 0 | Status: SHIPPED | OUTPATIENT
Start: 2024-10-25

## 2024-10-25 RX ORDER — PREGABALIN 25 MG/1
CAPSULE ORAL
Qty: 90 CAPSULE | Refills: 0 | Status: SHIPPED | OUTPATIENT
Start: 2024-10-25

## 2024-10-25 NOTE — PROGRESS NOTES
Assessment & Plan   Problem List Items Addressed This Visit       Cervical stenosis of spinal canal    Relevant Medications    acetaminophen (TYLENOL) 500 MG tablet    pregabalin (LYRICA) 25 MG capsule    Dysuria    Relevant Medications    nitroFURantoin macrocrystal-monohydrate (MACROBID) 100 MG capsule    Other Relevant Orders    UA Macroscopic with reflex to Microscopic and Culture - Lab Collect (Completed)    UA Microscopic with Reflex to Culture (Completed)    Urine Culture    Hematoma of right lower leg    Relevant Medications    acetaminophen (TYLENOL) 500 MG tablet    Right knee injury, sequela     This patient continues to struggle with right knee pain as result of her falling.  At baseline, she was minimally mobile.  She struggles to walk with a walker within her home because of the size of the spaces.  Since her injury, she has consistently been struggling with pain.  - Agree with physical therapy.  Given that she has not been able to start at [facility] we will proceed with home care physical therapy.  Referral placed.  - Maximize benefit of Tylenol.  She can take it 1000 mg 3 times per day.  This was prescribed.  - Role for additional imaging?  Working diagnosis is contusion.  X-ray without fracture.  She has seen sports medicine.  Consider orthopedic surgery referral.  - Return to clinic in 10-14 days to review progress.         Relevant Medications    acetaminophen (TYLENOL) 500 MG tablet     Other Visit Diagnoses       Acute pain of right knee    -  Primary    Relevant Medications    acetaminophen (TYLENOL) 500 MG tablet    Other Relevant Orders    Home Care Referral    Polyuria        Relevant Orders    Hemoglobin A1c (Completed)    UA Macroscopic with reflex to Microscopic and Culture - Lab Collect (Completed)    UA Microscopic with Reflex to Culture (Completed)    Urine Culture    Other abnormal glucose        Relevant Orders    Hemoglobin A1c (Completed)    Hospital discharge follow-up      "   Age-related osteoporosis without current pathological fracture        Relevant Medications    acetaminophen (TYLENOL) 500 MG tablet             Magda Montgomery is a 64 year old, presenting for the following health issues:  RECHECK (Ongoing Knee pain) and Urinary Problem (Frequency urination x a month ago,  Possible pre-daibetes)        10/25/2024     1:07 PM   Additional Questions   Roomed by lisa     Knee pain:   - She has seen orthopaedics.  She was referred to PT last week   - walking is hard.  She is in bed, wheel chair and in scooter.  Her mobility is greatly reduced and this is started to affect her mood.     - holidays affect her mood.   - palliative: tylenol does not really help.      Urination symptoms:   - decreased urine   - worried about diabetes mellitus     History of Present Illness       Reason for visit:  Knee and urination  Symptom onset:  3-4 weeks ago  Symptoms include:  Pain now behind knee andin front  Symptom intensity:  Severe  Symptom progression:  Staying the same  Had these symptoms before:  No  What makes it worse:  Weight on knee  What makes it better:  Staying off knee and no weight on knee   She is taking medications regularly.           Objective    /83 (BP Location: Right arm, Patient Position: Sitting, Cuff Size: Adult Large)   Pulse 90   Temp 97.9  F (36.6  C) (Oral)   Resp 16   Ht 1.676 m (5' 6\")   Wt (!) 164.1 kg (361 lb 12.8 oz)   LMP  (LMP Unknown)   SpO2 92%   BMI 58.40 kg/m    Body mass index is 58.4 kg/m .  Physical Exam  Nursing note reviewed.   Constitutional:       General: She is not in acute distress.     Appearance: Normal appearance. She is not ill-appearing.   HENT:      Head: Normocephalic and atraumatic.   Eyes:      Extraocular Movements: Extraocular movements intact.      Conjunctiva/sclera: Conjunctivae normal.   Pulmonary:      Effort: Pulmonary effort is normal.   Musculoskeletal:      Comments: Ongoing pain.  No ecchymotic changes.  Right knee " and calf is larger than left side at baseline.  Tenderness to palpation laterally and medially.  Patient in wheelchair.   Neurological:      Mental Status: She is alert and oriented to person, place, and time.   Psychiatric:         Attention and Perception: Attention normal.         Mood and Affect: Mood normal.         Speech: Speech normal.         Thought Content: Thought content normal.                  Signed Electronically by: Jose Maria Morin MD

## 2024-10-26 PROBLEM — S89.91XS RIGHT KNEE INJURY, SEQUELA: Status: ACTIVE | Noted: 2023-11-17

## 2024-10-26 LAB
ANION GAP SERPL CALCULATED.3IONS-SCNC: 9 MMOL/L (ref 7–15)
BUN SERPL-MCNC: 10.9 MG/DL (ref 8–23)
CALCIUM SERPL-MCNC: 8.7 MG/DL (ref 8.8–10.4)
CHLORIDE SERPL-SCNC: 106 MMOL/L (ref 98–107)
CREAT SERPL-MCNC: 0.85 MG/DL (ref 0.51–0.95)
EGFRCR SERPLBLD CKD-EPI 2021: 77 ML/MIN/1.73M2
GLUCOSE SERPL-MCNC: 112 MG/DL (ref 70–99)
HCO3 SERPL-SCNC: 26 MMOL/L (ref 22–29)
POTASSIUM SERPL-SCNC: 4 MMOL/L (ref 3.4–5.3)
SODIUM SERPL-SCNC: 141 MMOL/L (ref 135–145)

## 2024-10-26 RX ORDER — NITROFURANTOIN 25; 75 MG/1; MG/1
100 CAPSULE ORAL 2 TIMES DAILY
Qty: 10 CAPSULE | Refills: 0 | Status: SHIPPED | OUTPATIENT
Start: 2024-10-26 | End: 2024-10-31

## 2024-10-26 NOTE — ASSESSMENT & PLAN NOTE
This patient continues to struggle with right knee pain as result of her falling.  At baseline, she was minimally mobile.  She struggles to walk with a walker within her home because of the size of the spaces.  Since her injury, she has consistently been struggling with pain.  - Agree with physical therapy.  Given that she has not been able to start at [facility] we will proceed with home care physical therapy.  Referral placed.  - Maximize benefit of Tylenol.  She can take it 1000 mg 3 times per day.  This was prescribed.  - Role for additional imaging?  Working diagnosis is contusion.  X-ray without fracture.  She has seen sports medicine.  Consider orthopedic surgery referral.  - Return to clinic in 10-14 days to review progress.

## 2024-10-26 NOTE — ASSESSMENT & PLAN NOTE
Acute complaint.  UA/UC suggestive of urinary tract infection.  Treat with nitrofurantoin.  Culture is pending.

## 2024-10-27 LAB — BACTERIA UR CULT: NORMAL

## 2024-10-28 DIAGNOSIS — M81.8 IDIOPATHIC OSTEOPOROSIS: Primary | ICD-10-CM

## 2024-10-29 ENCOUNTER — TELEPHONE (OUTPATIENT)
Dept: FAMILY MEDICINE | Facility: CLINIC | Age: 64
End: 2024-10-29
Payer: COMMERCIAL

## 2024-10-29 DIAGNOSIS — M81.8 IDIOPATHIC OSTEOPOROSIS: Primary | ICD-10-CM

## 2024-10-29 NOTE — TELEPHONE ENCOUNTER
----- Message from Alice Rolle sent at 10/28/2024  7:17 PM CDT -----  Call the pt. Her calcium is low and we cannot continue Prolia treatment if she does not improve calcium intake. She needs to start taking calcium pills, 600 mg bid. Please advise and schedule to have lab appointment before Prolia appointment in Nov. I will place the orders.

## 2024-10-29 NOTE — TELEPHONE ENCOUNTER
Called and relayed provider message in detail. Patient is requesting provider order the calcium supplement for her as she states she cannot afford to buy them OTC.

## 2024-10-30 ENCOUNTER — ANTICOAGULATION THERAPY VISIT (OUTPATIENT)
Dept: ANTICOAGULATION | Facility: CLINIC | Age: 64
End: 2024-10-30
Payer: COMMERCIAL

## 2024-10-30 DIAGNOSIS — I26.99 PULMONARY EMBOLISM WITH INFARCTION (H): Primary | ICD-10-CM

## 2024-10-30 DIAGNOSIS — Z86.711 HISTORY OF PULMONARY EMBOLISM: ICD-10-CM

## 2024-10-30 DIAGNOSIS — Z79.01 LONG TERM (CURRENT) USE OF ANTICOAGULANTS: ICD-10-CM

## 2024-10-30 LAB — INR (EXTERNAL): 4.1

## 2024-10-30 NOTE — PROGRESS NOTES
ANTICOAGULATION MANAGEMENT     Valentina Olmedo 64 year old female is on warfarin with supratherapeutic INR result. (Goal INR 2.0-3.0)    Recent labs: (last 7 days)     10/30/24  0900   INR 4.1       ASSESSMENT     Source(s): Chart Review and Home Care/Facility Nurse     Warfarin doses taken: Warfarin taken as instructed  Diet: No new diet changes identified  Medication/supplement changes:  Nitrofurantoin 5 day course (dates: 10-26-11/2 AM) No interaction anticipated  Acetaminophen dose increased may be increasing INR today  New illness, injury, or hospitalization: Yes: Urinary problems, per patient still having a hard time starting to urinate.  Signs or symptoms of bleeding or clotting: No  Previous result: Supratherapeutic  Additional findings:  Home Care given okay to check INR plus or minus 1 day       PLAN     Recommended plan for temporary change(s) and ongoing change(s) affecting INR     Dosing Instructions: hold dose then decrease your warfarin dose (4% change) with next INR in 1 week       Summary  As of 10/30/2024      Full warfarin instructions:  10/30: Hold; Otherwise 10 mg every Sun, Tue, Thu; 7.5 mg all other days   Next INR check:  11/6/2024               Telephone call with [facility] home care nurse who verbalizes understanding and agrees to plan and who agrees to plan and repeated back plan correctly    Orders given to  Homecare nurse/facility to recheck    Education provided: Symptom monitoring: monitoring for bleeding signs and symptoms    Plan made per [facility] anticoagulation protocol    Zari Maria RN  10/30/2024  Anticoagulation Clinic  [facility] [employer] for routing messages: raquel MATHUR  [facility] patient phone line: [phone]        _______________________________________________________________________     Anticoagulation Episode Summary       Current INR goal:  2.0-3.0   TTR:  54.7% (1 y)   Target end date:  Indefinite   Send INR reminders to:  PIPO MATHUR    Indications    History of pulmonary  embolism (Resolved) [Z86.711]  Pulmonary embolism with infarction (H) [I26.99]  Long term (current) use of anticoagulants [Z79.01]  History of pulmonary embolism [Z86.711]             Comments:  --             Anticoagulation Care Providers       Provider Role Specialty Phone number    Promsie Vanegas MD Referring Family Medicine [phone]    Donald Rooney MD Referring Internal Medicine [phone]    Jose Maria Morin MD Referring Family Medicine [phone]

## 2024-11-01 ENCOUNTER — MEDICAL CORRESPONDENCE (OUTPATIENT)
Dept: HEALTH INFORMATION MANAGEMENT | Facility: CLINIC | Age: 64
End: 2024-11-01
Payer: COMMERCIAL

## 2024-11-04 ENCOUNTER — TRANSFERRED RECORDS (OUTPATIENT)
Dept: HEALTH INFORMATION MANAGEMENT | Facility: CLINIC | Age: 64
End: 2024-11-04
Payer: COMMERCIAL

## 2024-11-06 ENCOUNTER — ANTICOAGULATION THERAPY VISIT (OUTPATIENT)
Dept: ANTICOAGULATION | Facility: CLINIC | Age: 64
End: 2024-11-06
Payer: COMMERCIAL

## 2024-11-06 DIAGNOSIS — Z86.711 HISTORY OF PULMONARY EMBOLISM: ICD-10-CM

## 2024-11-06 DIAGNOSIS — I26.99 PULMONARY EMBOLISM WITH INFARCTION (H): Primary | ICD-10-CM

## 2024-11-06 DIAGNOSIS — Z79.01 LONG TERM (CURRENT) USE OF ANTICOAGULANTS: ICD-10-CM

## 2024-11-06 LAB — INR (EXTERNAL): 2.1 (ref 0.9–1.1)

## 2024-11-06 NOTE — PROGRESS NOTES
ANTICOAGULATION MANAGEMENT     Valentina Olmedo 64 year old female is on warfarin with therapeutic INR result. (Goal INR 2.0-3.0)    Recent labs: (last 7 days)     11/06/24  1036   INR 2.1*       ASSESSMENT     Source(s): Chart Review, Patient/Caregiver Call, and Home Care/Facility Nurse     Warfarin doses taken: Warfarin taken as instructed  Diet: No new diet changes identified  Medication/supplement changes:  Tylenol in AM and PM (2000mg daily)  New illness, injury, or hospitalization: Yes: OTC tylenol cutting the pain, but still in pain/discomfort   Signs or symptoms of bleeding or clotting: No  Previous result: Supratherapeutic  Additional findings:None       PLAN     Recommended plan for no diet, medication or health factor changes affecting INR     Dosing Instructions: Continue your current warfarin dose with next INR in 1 week       Summary  As of 11/6/2024      Full warfarin instructions:  10 mg every Sun, Tue, Thu; 7.5 mg all other days   Next INR check:  11/13/2024               Telephone call with [facility] home care nurse who verbalizes understanding and agrees to plan and who agrees to plan and repeated back plan correctly    Orders given to  Homecare nurse/facility to recheck    Education provided: Contact [phone] with any changes, questions or concerns.     Plan made per [facility] anticoagulation protocol    Ashley Parsons RN  11/6/2024  Anticoagulation Clinic  [employer] for routing messages: raquel MATHUR  [facility] patient phone line: [phone]        _______________________________________________________________________     Anticoagulation Episode Summary       Current INR goal:  2.0-3.0   TTR:  55.5% (1 y)   Target end date:  Indefinite   Send INR reminders to:  PIPO MATHUR    Indications    History of pulmonary embolism (Resolved) [Z86.711]  Pulmonary embolism with infarction (H) [I26.99]  Long term (current) use of anticoagulants [Z79.01]  History of pulmonary embolism [Z86.711]              Comments:  --             Anticoagulation Care Providers       Provider Role Specialty Phone number    Promise Vanegas MD Referring Family Medicine [phone]    Donald Rooney MD Referring Internal Medicine [phone]    Jose Maria Morin MD Referring Family Medicine [phone]

## 2024-11-11 ENCOUNTER — OFFICE VISIT (OUTPATIENT)
Dept: FAMILY MEDICINE | Facility: CLINIC | Age: 64
End: 2024-11-11
Payer: COMMERCIAL

## 2024-11-11 VITALS
OXYGEN SATURATION: 94 % | TEMPERATURE: 98.6 F | HEART RATE: 78 BPM | RESPIRATION RATE: 16 BRPM | BODY MASS INDEX: 47.09 KG/M2 | WEIGHT: 293 LBS | HEIGHT: 66 IN | SYSTOLIC BLOOD PRESSURE: 149 MMHG | DIASTOLIC BLOOD PRESSURE: 94 MMHG

## 2024-11-11 DIAGNOSIS — I10 ESSENTIAL HYPERTENSION: ICD-10-CM

## 2024-11-11 DIAGNOSIS — R30.0 DYSURIA: Primary | ICD-10-CM

## 2024-11-11 DIAGNOSIS — M81.8 IDIOPATHIC OSTEOPOROSIS: ICD-10-CM

## 2024-11-11 DIAGNOSIS — S80.11XA HEMATOMA OF RIGHT LOWER LEG: ICD-10-CM

## 2024-11-11 DIAGNOSIS — M25.561 ACUTE PAIN OF RIGHT KNEE: ICD-10-CM

## 2024-11-11 DIAGNOSIS — S89.91XS RIGHT KNEE INJURY, SEQUELA: ICD-10-CM

## 2024-11-11 DIAGNOSIS — E03.9 ACQUIRED HYPOTHYROIDISM: ICD-10-CM

## 2024-11-11 LAB
ALBUMIN UR-MCNC: NEGATIVE MG/DL
APPEARANCE UR: CLEAR
BACTERIA #/AREA URNS HPF: ABNORMAL /HPF
BILIRUB UR QL STRIP: NEGATIVE
CA-I BLD-MCNC: 4.8 MG/DL (ref 4.4–5.2)
COLOR UR AUTO: YELLOW
GLUCOSE UR STRIP-MCNC: NEGATIVE MG/DL
HGB UR QL STRIP: ABNORMAL
KETONES UR STRIP-MCNC: NEGATIVE MG/DL
LEUKOCYTE ESTERASE UR QL STRIP: NEGATIVE
NITRATE UR QL: NEGATIVE
PH UR STRIP: 7 [PH] (ref 5–8)
RBC #/AREA URNS AUTO: ABNORMAL /HPF
SP GR UR STRIP: 1.01 (ref 1–1.03)
SQUAMOUS #/AREA URNS AUTO: ABNORMAL /LPF
TRANS CELLS #/AREA URNS HPF: ABNORMAL /HPF
UROBILINOGEN UR STRIP-ACNC: 0.2 E.U./DL
WBC #/AREA URNS AUTO: ABNORMAL /HPF

## 2024-11-11 PROCEDURE — 99214 OFFICE O/P EST MOD 30 MIN: CPT | Performed by: FAMILY MEDICINE

## 2024-11-11 PROCEDURE — 80048 BASIC METABOLIC PNL TOTAL CA: CPT | Performed by: FAMILY MEDICINE

## 2024-11-11 PROCEDURE — 36415 COLL VENOUS BLD VENIPUNCTURE: CPT | Performed by: FAMILY MEDICINE

## 2024-11-11 PROCEDURE — G2211 COMPLEX E/M VISIT ADD ON: HCPCS | Performed by: FAMILY MEDICINE

## 2024-11-11 PROCEDURE — 82330 ASSAY OF CALCIUM: CPT | Performed by: FAMILY MEDICINE

## 2024-11-11 PROCEDURE — 81001 URINALYSIS AUTO W/SCOPE: CPT | Performed by: FAMILY MEDICINE

## 2024-11-11 RX ORDER — DIAZEPAM 5 MG/1
5 TABLET ORAL EVERY 6 HOURS PRN
Qty: 2 TABLET | Refills: 0 | Status: SHIPPED | OUTPATIENT
Start: 2024-11-11

## 2024-11-11 RX ORDER — HYDROCODONE BITARTRATE AND ACETAMINOPHEN 5; 325 MG/1; MG/1
.5-1 TABLET ORAL EVERY 6 HOURS PRN
Qty: 10 TABLET | Refills: 0 | Status: SHIPPED | OUTPATIENT
Start: 2024-11-11 | End: 2024-11-14

## 2024-11-11 ASSESSMENT — PATIENT HEALTH QUESTIONNAIRE - PHQ9: SUM OF ALL RESPONSES TO PHQ QUESTIONS 1-9: 18

## 2024-11-11 NOTE — PROGRESS NOTES
Assessment & Plan   Problem List Items Addressed This Visit       Dysuria - Primary     Prepubic pain.  Polyuria.  Dysuria.  No hematuria.  She has the CT scan tomorrow.  Her urologist suggested topical estradiol periurethrally applied.  She has not yet started this.  Will check a urinalysis today.  If there are markers of infection, depending on how different or similar they are to the ones that she had on 10/25 we will consider treating for bladder infection.  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  Valium prescribed to help facilitate CT scan.         Relevant Orders    UA Macroscopic with reflex to Microscopic and Culture - Lab Collect    Essential hypertension    Hematoma of right lower leg     Pain remains severe.  She is struggling with mobility.  Referral placed orthopedics (second opinion with different orthopedic group).  Proceed with MRI.  She is starting physical therapy but has not had only 1 session.  Her mobility was poor prior to this injury and her immobility since the injury may be contributing to her pain.  Limited dispense prescription of Mobile sent to pharmacy to help facilitate movement and bathing and clothing changes.  Discussed tensional side effects.  Discussed that this cannot be mixed with the Valium that she will take prior to the MRI of the right knee that I ordered as well.         Relevant Medications    HYDROcodone-acetaminophen (NORCO) 5-325 MG tablet    Hypothyroidism    Right knee injury, sequela    Relevant Medications    diazepam (VALIUM) 5 MG tablet    HYDROcodone-acetaminophen (NORCO) 5-325 MG tablet    Other Relevant Orders    MR Knee Right w/o Contrast    Orthopedic  Referral     Other Visit Diagnoses       Acute pain of right knee        Relevant Medications    HYDROcodone-acetaminophen (NORCO) 5-325 MG tablet             The longitudinal plan of care for the diagnosis(es)/condition(s) as documented were addressed during this visit. Due to the added complexity  "in care, I will continue to support Valentina in the subsequent management and with ongoing continuity of care.      Depression Screening Follow Up        11/11/2024     1:53 PM   PHQ   PHQ-9 Total Score 18   Q9: Thoughts of better off dead/self-harm past 2 weeks Several days         11/11/2024     1:53 PM   Last PHQ-9   1.  Little interest or pleasure in doing things 1   2.  Feeling down, depressed, or hopeless 1   3.  Trouble falling or staying asleep, or sleeping too much 1   4.  Feeling tired or having little energy 3   5.  Poor appetite or overeating 3   6.  Feeling bad about yourself 3   7.  Trouble concentrating 3   8.  Moving slowly or restless 2   Q9: Thoughts of better off dead/self-harm past 2 weeks 1   PHQ-9 Total Score 18   Difficulty at work, home, or with people Extremely dIfficult     Follow Up Actions Taken  Crisis resource information provided in the After Visit Summary    Discussed the following ways the patient can remain in a safe environment:  be around others      Subjective   Valentina is a 64 year old, presenting for the following health issues:  RECHECK        11/11/2024     1:53 PM   Additional Questions   Roomed by xl   Accompanied by      Right knee pain:   - home PT.   - she is struggling with mobility   - walking is painful   - she does exercises that have been suggested by Swapna Gillette.  \"Sometimes worse.\"      Polyuria: Ongoing.  No improvement with Macrobid.  She is concerned she may have a stone.  She has pain in the suprapubic region.    History of Present Illness       Reason for visit:  Right knee and part of leg  Symptom onset:  More than a month   She is taking medications regularly.     Provider Documentation  Link to C-SSRS ([facility]) Flowsheet :[id number]}      Objective    BP (!) 149/94 (BP Location: Left arm, Patient Position: Sitting, Cuff Size: Adult Large)   Pulse 78   Temp 98.6  F (37  C) (Oral)   Resp 16   Ht 1.676 m (5' 6\")   Wt (!) 162.7 kg (358 lb 11.2 oz)   " LMP  (LMP Unknown)   SpO2 94%   BMI 57.90 kg/m    Body mass index is 57.9 kg/m .  Physical Exam  Nursing note reviewed.   Constitutional:       General: She is not in acute distress.     Appearance: Normal appearance. She is not ill-appearing.   HENT:      Head: Normocephalic and atraumatic.   Eyes:      Extraocular Movements: Extraocular movements intact.      Conjunctiva/sclera: Conjunctivae normal.   Pulmonary:      Effort: Pulmonary effort is normal.   Musculoskeletal:      Comments: Body habitus is obese.  There is a swelling in the anterior right knee that is tender to palpation.  No ecchymotic changes.  Wheelchair.  She is able to flex and extend her knee while seated in her chair.  She is also able to adduct and AB duct.    Neurological:      Mental Status: She is alert and oriented to person, place, and time.   Psychiatric:         Attention and Perception: Attention normal.         Mood and Affect: Mood normal.         Speech: Speech normal.         Thought Content: Thought content normal.                  Signed Electronically by: Jose Maria Morin MD

## 2024-11-11 NOTE — ASSESSMENT & PLAN NOTE
Prepubic pain.  Polyuria.  Dysuria.  No hematuria.  She has the CT scan tomorrow.  Her urologist suggested topical estradiol periurethrally applied.  She has not yet started this.  Will check a urinalysis today.  If there are markers of infection, depending on how different or similar they are to the ones that she had on 10/25 we will consider treating for bladder infection.  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  Valium prescribed to help facilitate CT scan.

## 2024-11-11 NOTE — ASSESSMENT & PLAN NOTE
Pain remains severe.  She is struggling with mobility.  Referral placed orthopedics (second opinion with different orthopedic group).  Proceed with MRI.  She is starting physical therapy but has not had only 1 session.  Her mobility was poor prior to this injury and her immobility since the injury may be contributing to her pain.  Limited dispense prescription of Irene sent to pharmacy to help facilitate movement and bathing and clothing changes.  Discussed tensional side effects.  Discussed that this cannot be mixed with the Valium that she will take prior to the MRI of the right knee that I ordered as well.

## 2024-11-12 ENCOUNTER — TELEPHONE (OUTPATIENT)
Dept: FAMILY MEDICINE | Facility: CLINIC | Age: 64
End: 2024-11-12
Payer: COMMERCIAL

## 2024-11-12 ENCOUNTER — PATIENT OUTREACH (OUTPATIENT)
Dept: CARE COORDINATION | Facility: CLINIC | Age: 64
End: 2024-11-12
Payer: COMMERCIAL

## 2024-11-12 DIAGNOSIS — J30.2 SEASONAL ALLERGIC RHINITIS, UNSPECIFIED TRIGGER: ICD-10-CM

## 2024-11-12 LAB
ANION GAP SERPL CALCULATED.3IONS-SCNC: 15 MMOL/L (ref 7–15)
BUN SERPL-MCNC: 11.7 MG/DL (ref 8–23)
CALCIUM SERPL-MCNC: 9.5 MG/DL (ref 8.8–10.4)
CHLORIDE SERPL-SCNC: 99 MMOL/L (ref 98–107)
CREAT SERPL-MCNC: 0.74 MG/DL (ref 0.51–0.95)
EGFRCR SERPLBLD CKD-EPI 2021: 90 ML/MIN/1.73M2
GLUCOSE SERPL-MCNC: 108 MG/DL (ref 70–99)
HCO3 SERPL-SCNC: 21 MMOL/L (ref 22–29)
POTASSIUM SERPL-SCNC: 4.4 MMOL/L (ref 3.4–5.3)
SODIUM SERPL-SCNC: 135 MMOL/L (ref 135–145)

## 2024-11-12 RX ORDER — FLUTICASONE PROPIONATE 50 MCG
SPRAY, SUSPENSION (ML) NASAL
Qty: 16 G | Refills: 2 | Status: SHIPPED | OUTPATIENT
Start: 2024-11-12

## 2024-11-12 NOTE — TELEPHONE ENCOUNTER
Medication Request  Medication name: fluticasone (FLONASE) 50 MCG/ACT nasal spray   Requested [facility]acy:  [facility]  When was patient last seen for this?:  11/17/23  Patient offered appointment:  No  Okay to leave a detailed message: yes    Patient requesting refill and sig change to 2 sprays in each nostril daily. She reports a provider made the recommendation to increase, but cannot remember which provider or at what visit.

## 2024-11-13 ENCOUNTER — ANTICOAGULATION THERAPY VISIT (OUTPATIENT)
Dept: ANTICOAGULATION | Facility: CLINIC | Age: 64
End: 2024-11-13
Payer: COMMERCIAL

## 2024-11-13 DIAGNOSIS — Z86.711 HISTORY OF PULMONARY EMBOLISM: ICD-10-CM

## 2024-11-13 DIAGNOSIS — I26.99 PULMONARY EMBOLISM WITH INFARCTION (H): Primary | ICD-10-CM

## 2024-11-13 DIAGNOSIS — Z79.01 LONG TERM (CURRENT) USE OF ANTICOAGULANTS: ICD-10-CM

## 2024-11-13 LAB — INR (EXTERNAL): 1.4 (ref 0.9–1.1)

## 2024-11-13 NOTE — PROGRESS NOTES
ANTICOAGULATION MANAGEMENT     Valentina Olmedo 64 year old female is on warfarin with subtherapeutic INR result. (Goal INR 2.0-3.0)    Recent labs: (last 7 days)     11/13/24  1200   INR 1.4*       ASSESSMENT     Source(s): Chart Review, Patient/Caregiver Call, and Home Care/Facility Nurse     Warfarin doses taken: Warfarin taken as instructed  Diet: No new diet changes identified  Medication/supplement changes: out of melatonion last week.Concurrent use of MELATONIN and WARFARIN may result in an increased risk of bleeding. Patient now is back using it.  New illness, injury, or hospitalization: No  Signs or symptoms of bleeding or clotting: No  Previous result: Therapeutic last visit; previously outside of goal range  Additional findings: None       PLAN     Recommended plan for temporary change(s) affecting INR     Dosing Instructions: booster dose then continue your current warfarin dose with next INR in 1 week       Summary  As of 11/13/2024      Full warfarin instructions:  11/13: 15 mg; Otherwise 10 mg every Sun, Tue, Thu; 7.5 mg all other days   Next INR check:  11/20/2024               Telephone call with Valentina BOO, [facility] home care nurse who agrees to plan and repeated back plan correctly    Orders given to  Homecare nurse/facility to recheck    Education provided: None required    Plan made per [facility] anticoagulation protocol    Kindra Garay RN  11/13/2024  Anticoagulation Clinic  [employer] for routing messages: raquel MATHUR  [facility] patient phone line: [phone]        _______________________________________________________________________     Anticoagulation Episode Summary       Current INR goal:  2.0-3.0   TTR:  55.8% (1 y)   Target end date:  Indefinite   Send INR reminders to:  PIPO MATHUR    Indications    History of pulmonary embolism (Resolved) [Z86.711]  Pulmonary embolism with infarction (H) [I26.99]  Long term (current) use of anticoagulants [Z79.01]  History of pulmonary embolism  [I68.473]             Comments:  --             Anticoagulation Care Providers       Provider Role Specialty Phone number    Promise Vanegas MD Referring Family Medicine [phone]    Donald Rooney MD Referring Internal Medicine [phone]    Jose Maria Morin MD Referring Family Medicine [phone]

## 2024-11-14 ENCOUNTER — ALLIED HEALTH/NURSE VISIT (OUTPATIENT)
Dept: FAMILY MEDICINE | Facility: CLINIC | Age: 64
End: 2024-11-14
Payer: COMMERCIAL

## 2024-11-14 ENCOUNTER — PATIENT OUTREACH (OUTPATIENT)
Dept: CARE COORDINATION | Facility: CLINIC | Age: 64
End: 2024-11-14

## 2024-11-14 ENCOUNTER — TELEPHONE (OUTPATIENT)
Dept: FAMILY MEDICINE | Facility: CLINIC | Age: 64
End: 2024-11-14

## 2024-11-14 DIAGNOSIS — M81.0 AGE-RELATED OSTEOPOROSIS WITHOUT CURRENT PATHOLOGICAL FRACTURE: Primary | ICD-10-CM

## 2024-11-14 PROCEDURE — 96372 THER/PROPH/DIAG INJ SC/IM: CPT | Performed by: INTERNAL MEDICINE

## 2024-11-14 PROCEDURE — 99207 PR NO CHARGE NURSE ONLY: CPT

## 2024-11-14 NOTE — PROGRESS NOTES
Clinic Administered Medication Documentation      Prolia Documentation    Indication: Prolia  (denosumab) is a prescription medicine used to treat osteoporosis in patients who:   Are at high risk for fracture, meaning patients who have had a fracture related to osteoporosis, or who have multiple risk factors for fracture.  Cannot use another osteoporosis medicine or other osteoporosis medicines did not work well.  The timeline for early/late injections would be 4 weeks early and any time after the 6 month joe. If a patient receives their injection late, then the subsequent injection would be 6 months from the date that they actually received the injection.    When was the last injection?  24  Was the last injection at least 6 months ago? Yes  Has the prior authorization been completed?  Yes  Is there an active order (written within the past 365 days, with administrations remaining, not ) in the chart?  Yes   GFR Estimate   Date Value Ref Range Status   2024 90 >60 mL/min/1.73m2 Final     Comment:     eGFR calculated using  CKD-EPI equation.   2021 >60 >60 mL/min/1.73m2 Final   2015 >90  Non  GFR Calc   >60 mL/min/1.7m2 Final     Has patient had a GFR within the last 12 months? Yes   Is GFR under 30, or patient has a diagnosis of CKD4 or CKD5? No   Patient denies gastric bypass or parathyroid surgery in past 6 months? Yes - patient denies.   Patient denies dental work in the past two months involving drilling into the bone, such as implants/extractions, oral surgery or a tooth extraction that has not healed yet?  Yes  Patient denies plans for an emergency tooth extraction within the next week? Yes    The following steps were completed to comply with the REMS program for Prolia:  Reviewed information in the Medication Guide, including the serious risks of Prolia  and the symptoms of each risk.  Advised patient to seek prompt medical attention if they have signs or  symptoms of any of the serious risks.  Provided each patient a copy of the Medication Guide and Patient Guide.    Prior to injection, verified patient identity using patient's name and date of birth. Medication was administered. Please see MAR and medication order for additional information. Patient instructed to remain in clinic for 15 minutes and report any adverse reaction to staff immediately.    Vial/Syringe: Syringe  Was this medication supplied by the patient? No  Verified that the patient has administrations remaining in their prescription.

## 2024-11-14 NOTE — TELEPHONE ENCOUNTER
"Called Nurse and relayed message from Dr Morin:  \"  Jose Maria Morin MD28 minutes ago (3:53 PM)        \"Agree with calcium carbonate 500 mg twice daily twice daily Gummies.  This is similar dosing to Dr. Denise's prescription.  However, I do not have an order within epic.  Can they take a verbal?     Otherwise we would check with the prescribing specialist for alternatives.\"        "

## 2024-11-14 NOTE — CONFIDENTIAL NOTE
Agree with calcium carbonate 500 mg twice daily twice daily Gummies.  This is similar dosing to Dr. Denise's prescription.  However, I do not have an order within epic.  Can they take a verbal?    Otherwise we would check with the prescribing specialist for alternatives.

## 2024-11-14 NOTE — TELEPHONE ENCOUNTER
Home Care is calling regarding an established patient with [facility].       Requesting orders from: Jose Maria Morin  Provider is following patient: Yes  Is this a 60-day recertification request?  No    Orders Requested  Verbal orders needed for requested medication change- Calcium carbonate 500 mg gummies once a day.  This is due to having trouble swallowing tablets as was prescribed      Information was gathered and will be sent to provider for review.  RN will contact Home Care with information after provider review.  Confirmed ok to leave a detailed message with call back.  Contact information confirmed and updated as needed.    Chen Paz, RN

## 2024-11-15 ENCOUNTER — MEDICAL CORRESPONDENCE (OUTPATIENT)
Dept: HEALTH INFORMATION MANAGEMENT | Facility: CLINIC | Age: 64
End: 2024-11-15
Payer: COMMERCIAL

## 2024-11-18 ENCOUNTER — TELEPHONE (OUTPATIENT)
Dept: FAMILY MEDICINE | Facility: CLINIC | Age: 64
End: 2024-11-18
Payer: COMMERCIAL

## 2024-11-18 ENCOUNTER — TELEPHONE (OUTPATIENT)
Dept: ORTHOPEDICS | Facility: CLINIC | Age: 64
End: 2024-11-18
Payer: COMMERCIAL

## 2024-11-18 NOTE — TELEPHONE ENCOUNTER
General Call    Contacts       Contact Date/Time Type Contact Phone/Fax    11/18/2024 01:02 PM CST Phone (Incoming) Valentina Olmedo (Self) [phone] ()          Reason for Call:  Imaging Order    What are your questions or concerns:  Patient calling to report she tried to schedule knee MRI with  [facility] but was told she needs to have this done at [facility] to accommodate patient's needs.    MRI order printed and placed at Dr. Morin's workstation. Imaging order needs to be signed in order to be sent to outside facility.    Once signed, writer will fax to [facility]. Patient has already been given scheduling number to contact [facility].

## 2024-11-18 NOTE — TELEPHONE ENCOUNTER
I made an outbound phone call to the patient and left a brief voicemail.  There is no consent to communicate with the patient on file.  Goal of the call is to inform patient that tomorrow's appointment may not be appropriate, as she has an outstanding MRI order that needs to be completed prior to her appointment.  Additionally, per provider request, patient may be more fit to have her second opinion with Dr. Redman.  Gave patient the callback number of [phone] if she would like to discuss.  Additionally, I will be sending her a [employer] message.  If patient calls back, please inform her that she should complete her MRI prior to doing a second opinion appointment with our department.  If this is already been done, please make sure the MRI is in our system somewhere.  Please schedule patient with Dr. Redman if she calls back.    Monroe Vargas ATC

## 2024-11-19 ENCOUNTER — ANTICOAGULATION THERAPY VISIT (OUTPATIENT)
Dept: ANTICOAGULATION | Facility: CLINIC | Age: 64
End: 2024-11-19

## 2024-11-19 ENCOUNTER — TELEPHONE (OUTPATIENT)
Dept: ORTHOPEDICS | Facility: CLINIC | Age: 64
End: 2024-11-19

## 2024-11-19 DIAGNOSIS — I26.99 PULMONARY EMBOLISM WITH INFARCTION (H): Primary | ICD-10-CM

## 2024-11-19 DIAGNOSIS — Z79.01 LONG TERM (CURRENT) USE OF ANTICOAGULANTS: ICD-10-CM

## 2024-11-19 DIAGNOSIS — Z86.711 HISTORY OF PULMONARY EMBOLISM: ICD-10-CM

## 2024-11-19 LAB — INR (EXTERNAL): 2.6 (ref 0.9–1.1)

## 2024-11-19 NOTE — CONFIDENTIAL NOTE
I spoke with patient today.  She had not previously listened to her voicemail message that we had left earlier this week and had not read her [employer] message about her knee appointment today at 1:00 with me.  Patient has not had her knee MRI as previously recommended and ordered by Dr. Muhammad.  As of yesterday her primary care provider did fax a MRI order over to [facility].  MRI likely needs to be done at [facility] based on the patient's body habitus/scanner capabilities.  We discussed the patient's appointment today will be canceled and that she is to call [facility] and get her MRI of her knee scheduled.  Patient does have [facility]'s number.  Once MRI has been completed, patient is to schedule an appointment for clinic visit with us to review her right knee MRI and determine treatment plan.  Tesfaye Cantor PA-C

## 2024-11-19 NOTE — PROGRESS NOTES
ANTICOAGULATION MANAGEMENT     Valentina Olmedo 64 year old female is on warfarin with therapeutic INR result. (Goal INR 2.0-3.0)    Recent labs: (last 7 days)     11/19/24  1529   INR 2.6*       ASSESSMENT     Source(s): Chart Review and Home Care/Facility Nurse     Warfarin doses taken: Warfarin taken as instructed  Diet: No new diet changes identified  Medication/supplement changes: None noted  New illness, injury, or hospitalization: No  Signs or symptoms of bleeding or clotting: No  Previous result: Subtherapeutic  Additional findings: None       PLAN     Recommended plan for no diet, medication or health factor changes affecting INR     Dosing Instructions: Continue your current warfarin dose with next INR in 1 week       Summary  As of 11/19/2024      Full warfarin instructions:  10 mg every Sun, Tue, Thu; 7.5 mg all other days   Next INR check:  11/26/2024               Telephone call with [facility] home care nurse who verbalizes understanding and agrees to plan    Orders given to  Homecare nurse/facility to recheck    Education provided: Please call back if any changes to your diet, medications or how you've been taking warfarin    Plan made per [facility] anticoagulation protocol    Emerita Ca RN  11/19/2024  Anticoagulation Clinic  [employer] for routing messages: raquel MATHUR  [facility] patient phone line: [phone]        _______________________________________________________________________     Anticoagulation Episode Summary       Current INR goal:  2.0-3.0   TTR:  55.8% (1 y)   Target end date:  Indefinite   Send INR reminders to:  PIPO MATHUR    Indications    History of pulmonary embolism (Resolved) [Z86.711]  Pulmonary embolism with infarction (H) [I26.99]  Long term (current) use of anticoagulants [Z79.01]  History of pulmonary embolism [Z86.711]             Comments:  --             Anticoagulation Care Providers       Provider Role Specialty Phone number    Promise Vanegas MD Referring  Family Medicine [phone]    Donald Rooney MD Referring Internal Medicine [phone]    Jose Maria Morin MD Referring Family Medicine [phone]

## 2024-11-21 ENCOUNTER — TELEPHONE (OUTPATIENT)
Dept: ORTHOPEDICS | Facility: CLINIC | Age: 64
End: 2024-11-21
Payer: COMMERCIAL

## 2024-11-21 DIAGNOSIS — M48.02 CERVICAL STENOSIS OF SPINAL CANAL: ICD-10-CM

## 2024-11-21 DIAGNOSIS — F17.210 CIGARETTE NICOTINE DEPENDENCE: ICD-10-CM

## 2024-11-21 RX ORDER — PREGABALIN 25 MG/1
CAPSULE ORAL
Qty: 84 CAPSULE | Refills: 3 | Status: SHIPPED | OUTPATIENT
Start: 2024-11-21

## 2024-11-21 RX ORDER — VARENICLINE TARTRATE 1 MG/1
1 TABLET, FILM COATED ORAL 2 TIMES DAILY
Qty: 56 TABLET | Refills: 0 | Status: SHIPPED | OUTPATIENT
Start: 2024-11-21

## 2024-11-21 NOTE — TELEPHONE ENCOUNTER
Outbound call made to patient to see if she has obtained an MRI prior to her visit with Tesfaye Cantor PA-C. No answer, no consent to communicate. Writer left short message asking patient to call back and provided scheduling number for call back.    If patient calls back, please inquire if she has gotten her MRI or not.    CARLIE Cook, LAT, ATC

## 2024-11-22 NOTE — PROGRESS NOTES
ASSESSMENT & PLAN     Today we discussed the underlying etiology/pathology of patient's   1. closed fracture of proximal end of right tibia (Nondisplaced/incomplete, mildly comminuted fracture of the tibial eminence and proximal metaphysis), initial encounter DOI: 09/28/24    2. Morbid obesity (H)  BMI: 57    3. medial meniscus tear (radial tear with extrusion) of right knee, initial encounter    4. Primary osteoarthritis of right knee (moderate medial and mild PF joints)    5. Sprain of medial collateral ligament of right knee, grade 2, initial encounter    6. Long term (current) use of anticoagulants- warfarin    7. Pulmonary embolism with infarction (H)    8. History of thyroid cancer    9. Tremor- hands        Today a shared decision making model was used. The patient's values and choices were respected. The following information represents what was discussed and decided upon by the provider and the patient.  -We discussed the patient had an acute fall at home on 09/28/2024 resulting in an injury to her right leg.  - We discussed the patient normally is a minimal ambulator and either utilizes a wheelchair, motorized scooter, walker or holding onto objects to move around her apartment.  - Patient had an MRI of her right knee confirming a nondisplaced incomplete mildly comminuted fracture of the proximal tibia eminence and metaphysis with bone edema which may be treated nonsurgically.  We discussed that this injury/fracture or bone may take anywhere between 3-5 months to slowly improve.  The mainstay of treatment for this is to minimize weight to her right lower extremity which may cause further injury, delayed healing and chronic pain.  Patient should utilize at a minimum I quad cane with her left hand with ambulation.  A walker would be safer due to her history of falls but due to her tight apartment spaces she states that a walker and wheelchair do not allow her to mobilize within her apartment.  Patient should  minimize all weightbearing outside of the house and utilize her motorized scooter.  - Patient also has associated grade 2 MCL sprain with pain on the medial side of the knee and some slight laxity which again will be treated nonoperatively.  Order placed today for her to meet in the future with the orthotic department for custom hinged right knee brace to protect her MCL.  Patient should wear the brace at all times as she does frequently need to get up to use the restroom at night.  Patient understands that this MCL injury likely will take 8 to 10 weeks to heal.  I would like to see her back in 8 weeks for repeat assessment for that injury.  - Currently patient is undergoing formal physical therapy at [facility] for her right knee.  No that MRI results are known and patient needs to be minimal weightbearing on her leg and she has acceptable range of motion and is a minimal ambulator I do not believe that formal physical therapy is warranted at this time until patient has interval healing of her fracture and MCL injury.  Physical therapy may be put on hold at this time.  - Patient understood that she does have chronic underlying osteoarthritis of her right knee as well as a meniscus tear.  At this time I do not believe that this is largely contributing to her current condition.  In the future if she continues to have medial knee pain could be consideration for ultrasound-guided corticosteroid injection to treat her arthritis and meniscus tear.  - We discussed patient's current weight and BMI of 57.  We discussed that due to her BMI that she is not a surgical candidate for elective procedures.  Patient will continue to work with her primary care team on weight loss.  Patient does have a history of thyroid cancer which excludes her from use of GLP-1 medicines.  Patient states that she has met with bariatrics but they would like her to lose 25 pounds before considering bariatric surgery.  This is unrealistic as  patient now will be unable to mobilize due to her right leg injury and will not be able to exercise.  I would asked that her primary care provider again reengage with bariatrics about considering gastric bypass surgery as patient likely will need surgical intervention in the future for other developing joint conditions.  - Patient to be contacted by the orthotic department in the near future to be fitted for a right leg hinged knee brace.  Knee brace will allow full range of motion but will protect her MCL injury.  - In regards to pain management if she does not feel like she is controlled well I would recommend consultation with the pain team as patient already is on Cymbalta.  Pain team consultation placed today for patient to meet with them to determine appropriate pain management strategies.  She states that Cymbalta seems to contribute to her sense of instability and falls.  She states that this has been addressed with her primary care team but there was still would like her on a low-dose of this medicine.  -We did discuss that due to her chronic anticoagulation with warfarin healing that healing from the bone edema may certainly take longer due to continued blood congestion within the fracture pattern.  Patient needs to remain on her warfarin due to history of pulmonary embolism/clotting events.  - I would like to see the patient back in 8 weeks for repeat assessment.  X-rays are unlikely to be needed at that visit.    - Appointment line phone number is [[phone]]     Tesfaye Cantor PA-C  [facility]    This note was completed in part using a voice recognition software, any grammatical or context distortion are unintentional and inherent to the software.     IMPRESSION:    1. Nondisplaced/incomplete, mildly comminuted fracture of the tibial eminence and proximal metaphysis, with moderate surrounding bone marrow edema.    2. Full-thickness radial tearing of the posterior horn/root of  the medial meniscus measuring 1.1 cm, with 6 mm of meniscal extrusion. This tear is expected to diminish the intrinsic meniscal load sharing function.    3. Grade 2 MCL sprain with low-grade partial tearing of the superficial MCL, proximally.    4. Large knee joint effusion. No popliteal (Baker's) cyst.    5. Mild-moderate osteoarthritis of the medial compartment.    6. Minimal osteoarthritis of the patellofemoral compartment.    7. No ACL, PCL, or LCL sprain/tear.    8. No lateral meniscal tear or significant osteochondral abnormality of the lateral compartment.          SUBJECTIVE  Valentina Olmedo is a/an 64 year old female who is seen in consultation at the request of  Jose Maria Morin M.D. for evaluation of right knee pain. The patient is seen with their .    Expanded HPI: Patient states that on 09/28/2024 patient fell inside her home injuring her right knee.  She continued to have chronic pain and discomfort and was sent for a recent MRI with results available below.  Patient lives in a small apartment and states that she largely is a minimal ambulator.  When moving around her apartment it is small enough that she is unable to use a wheelchair or walker.  She usually holds onto countertops or other objects to help with mobility.  She has significant right knee pain more so over the anterior and medial aspect of the knee.  Patient is on chronic Cymbalta for pain management as she states that this likely contributes to some of her falls and dizziness issues.  She also is on chronic warfarin due to history of PEs.  Patient today is accompanied by her .  Patient presents in a wheelchair.    Onset: 2 month(s) ago - 9/28/24. Patient describes injury as a fall after getting out of bed and becoming dizzy  Location of Pain: right knee diffusely - complains of pain primarily on the anteromedial side  Rating of Pain at worst: 20-40/10  Rating of Pain Currently: 40/10  Worsened by: pressure, weight  bearing  Better with: nothing  Treatments tried: rest/activity avoidance, ice, and heat  Quality: none, sharp, shooting  Associated symptoms: swelling, warmth, redness, and weakness of the knee  Orthopedic history: NO  Relevant surgical history: NO  Social history: social history: Smoker: 1/2 PPD - quit about 1.5 weeks ago    Past Medical History:   Diagnosis Date    Adrenal mass (H) 10/12/2015    Chen Null MD  They were incidentally discovered on the CAT scan of the abdomen from December 2011 which was done for evaluation of kidney stones. F/up CT of the abdomen done on 6/26/12 showed a stable 5 mm nodular opacity in the right lower lung lobe, adjacent to the diagram. Bilateral adrenal masses average density measured less than 0 Hounsfield units, consistent with benign etio    Age-related cataract of both eyes, unspecified age-related cataract type 03/27/2024    Anxiety     Anxiety     Arthritis     Borderline personality disorder (H)     Cancer (H)     COPD (chronic obstructive pulmonary disease) (H)     Depression     Depressive disorder     History of COVID-19     Hyperlipidemia     Hypertension     Hypertension     Hyponatremia     Hypothyroidism     Malignant neoplasm of thyroid gland (H)     Chen Null MD  She had R hemithyroidectomy for a right neck tumor in 1994. According to the patient, the tumor was benign. She is not sure whether or not the tumor belonged to the thyroid gland. The surgery was done here at the [facility]. In January 2011, she was diagnosed with kidney stones. She was found to have an elevated calcium level of 11.7, with a PTH level of 368. Stone an    Primary hyperparathyroidism (H)           PTSD (post-traumatic stress disorder)     Pulmonary embolism with infarction (H) 01/12/2021    Recurrent otitis media     Seizure disorder (H)     Sleep apnea     cpap at [facility]    Stroke (H)     Tobacco abuse     Vertigo      Social History     Socioeconomic History     Marital status:    Tobacco Use    Smoking status: Former     Types: Cigarettes     Passive exposure: Never    Smokeless tobacco: Never    Tobacco comments:     Quit about 5 days ago per pt   Vaping Use    Vaping status: Never Used   Substance and Sexual Activity    Alcohol use: No     Comment: None.    Drug use: No    Sexual activity: Not Currently     Partners: Male     Social Drivers of Health     Financial Resource Strain: High Risk (1/3/2024)    Financial Resource Strain     Within the past 12 months, have you or your family members you live with been unable to get utilities (heat, electricity) when it was really needed?: Yes   Food Insecurity: Food Insecurity Present (6/15/2024)    Received from [facility]    Hunger Vital Sign     Worried About Running Out of Food in the Last Year: Sometimes true     Ran Out of Food in the Last Year: Sometimes true   Transportation Needs: No Transportation Needs (6/15/2024)    Received from [facility]    PRAPARE - Transportation     Lack of Transportation (Medical): No     Lack of Transportation (Non-Medical): No   Interpersonal Safety: Not At Risk (9/30/2024)    Received from [facility]    Humiliation, Afraid, Rape, and Kick questionnaire     Fear of Current or Ex-Partner: No     Emotionally Abused: No     Physically Abused: No     Sexually Abused: No   Housing Stability: High Risk (6/15/2024)    Received from [facility]    Housing Stability Vital Sign     Unable to Pay for Housing in the Last Year: Yes     Unstable Housing in the Last Year: No         Patient's past medical, surgical, social, and family histories were personally reviewed today and no changes are noted.    REVIEW OF SYSTEMS:  10 point ROS is negative other than symptoms noted above in HPI, Past Medical History or as stated below  Constitutional: NEGATIVE for fever, chills, change in weight  Skin: NEGATIVE for worrisome rashes, moles or lesions  GI/: NEGATIVE for bowel or bladder  changes  Neuro: NEGATIVE for weakness, dizziness or paresthesias    OBJECTIVE:  Vital signs as noted in EPIC for 11/22/2024  General: healthy, alert and in no distress  HEENT: no scleral icterus or conjunctival erythema  Skin: no suspicious lesions or rash. No jaundice.  CV: no pedal edema  Resp: normal respiratory effort without conversational dyspnea   Psych: normal mood and affect  Neuro: Normal light sensory exam of lower extremity      MSK:  Exam shows a morbidly obese 64-year-old female who presents in a bariatric wheelchair.  She is accompanied by her .  Patient is somewhat tearful and emotionally liable.  She is alert and orientated x 3.  Examination of both lower extremities with skin exposed shows previous lateral surgical incision over the proximal fibula which patient states was a evacuation of a hematoma from a fall in August 2024.  This is well-healed.  Patient also has a tattoo in the right lower extremity.  No obvious ecchymotic changes noted.  Any type of soft tissue swelling is difficult to distinguish due to patient's body habitus with BMI of 57.  Passively I can get her out to full knee extension but she has increased discomfort in the knee.  Flexion is past 115 degrees with no significant pain.  There is tenderness along the medial side of her knee as well as over the anterior proximal tibia.  No pain laterally.  Ligament exam shows slight laxity of the MCL with valgus stressing which generates pain medially.  No pain over the LCL.  Anterior and posterior drawer are not obtainable due to the patient's body habitus and leg size.  Gentle passive range of motion of the hip is pain-free and full without reproduction of right hemipelvic pain or right knee pain.  She shows adequate motor tone currently of the quadriceps and hamstrings.  Calf is nontender to palpation.  She can actively plantarflex and dorsiflex the foot and ankle.  She is sensory wise states equal sensory dermatome pattern to  L2-S1 bilaterally.            Personal Independent visualization of the below images done today:  Patient's right knee MRI is personally reviewed and reviewed with the patient and patient's  today.  All questions and concerns were addressed.  I agree with interpretation below.    EXAM: MRI of the RIGHT KNEE, without contrast    CLINICAL INFORMATION: Female, 64 years old, with sharp right knee pain.    INDICATION: Evaluate knee pain.    PRIOR SURGERY: None reported.    PLAIN FILMS: None available.    COMPARISONS: No prior MRIs available.    TECHNICAL INFORMATION: Using a 1.5T MR scanner and a localizing surface coil:    sagittals: PD, PDFS    coronals: PD, T2FS    axials: PD, PDFS    SEDATION: None    CONTRAST: None    FINDINGS:    Knee joint:    Effusion: Large right knee effusion.    Popliteal cyst: None.    Loose bodies: None.    Subcutaneous and extra-articular soft tissues: Unremarkable.      Ligaments:    ACL: Intact ACL anteromedial and posterolateral bundles, without sprain or tear.    PCL: Intact PCL, without acute or chronic injury.    MCL: Moderate periligamentous edema is present along the superficial MCL with low-grade partial-thickness tearing of the proximal fibers (coronal T2FS series 9 image 19 and axial PDFS series 5 images 15-19).    LCL: Intact LCL, without injury.    Posterolateral corner:    Mild popliteus tendinopathy of mild-moderate edema in the popliteus muscle belly. Biceps femoris, iliotibial band, popliteofibular ligament and lateral gastrocnemius are intact.    Posteromedial corner:    Mild semimembranosus tendinopathy, without tear. Pes anserine tendons and posterior oblique ligament are without injury, tendinopathy or bursitis.    Extensor mechanism:    Patellar tendon: Intact, without tendinopathy.    Quadriceps tendon: Intact, without tendinopathy.    Retinacula: Medial and lateral retinacula are intact.    Fat pads: Unremarkable infrapatellar Hoffa's, quadriceps and  prefemoral fat pads.    Medial compartment:    Medial meniscus: Full thickness radial tear of the medial meniscal posterior root measuring 1.1 cm (sagittal PDFS series 7 images 16-19). Associated meniscal extrusion measures 6 mm. No parameniscal cyst.    Medial femoral condyle & tibial plateau: Broad-based grade III chondromalacia throughout the central, weightbearing aspect of the medial compartment, with mild marginal osteophytosis.    Lateral compartment:    Lateral meniscus: No articular surface, meniscosynovial junction or root tear. No displacement, extrusion or parameniscal cyst.    Lateral femoral condyle & tibial plateau: Mild signal heterogeneity, surface irregularity, and thinning of the articular cartilage without full-thickness chondral loss or reactive osseous changes.    Patellofemoral joint:    Patella: Generalized grade II chondromalacia of the patella. Minimal marginal osteophytosis.    Trochlea: Broad-based grade II chondromalacia of the medial facet and central sulcus of the trochlea.    Proximal tibiofibular joint:    Unremarkable, without evidence of ligament sprain injury, joint effusion or adjacent marrow edema.    Bones:    Nondisplaced/incomplete fracture of the tibial eminence and proximal metaphysis with fracture planes extending through the medial and lateral aspects of the metaphysis, anteriorly (axial PD series 4 and axial PDFS series 5 image 30 and coronal PD series 8 and coronal T2FS series 9 images 13-23). Moderate bone marrow edema is present.    IMPRESSION:    1. Nondisplaced/incomplete, mildly comminuted fracture of the tibial eminence and proximal metaphysis, with moderate surrounding bone marrow edema.    2. Full-thickness radial tearing of the posterior horn/root of the medial meniscus measuring 1.1 cm, with 6 mm of meniscal extrusion. This tear is expected to diminish the intrinsic meniscal load sharing function.    3. Grade 2 MCL sprain with low-grade partial tearing of the  superficial MCL, proximally.    4. Large knee joint effusion. No popliteal (Baker's) cyst.    5. Mild-moderate osteoarthritis of the medial compartment.    6. Minimal osteoarthritis of the patellofemoral compartment.    7. No ACL, PCL, or LCL sprain/tear.    8. No lateral meniscal tear or significant osteochondral abnormality of the lateral compartment.    BC        Electronically signed on 11/21/2024 7:09:00 AM by Bhupendra Guthrie M.D.  Radiologist Signature  Patient's conditions were thoroughly discussed during today's visit with total time reviewing patient's previous medical records/history/radiology, face-to-face examination and discussion and plan of care with the patient and documentation being 75 minutes for today's clinical visit  Tesfaye Cantor PA-C  [facility]    This note was completed in part using a voice recognition software, any grammatical or context distortion are unintentional and inherent to the software.

## 2024-11-25 ENCOUNTER — MEDICAL CORRESPONDENCE (OUTPATIENT)
Dept: HEALTH INFORMATION MANAGEMENT | Facility: CLINIC | Age: 64
End: 2024-11-25
Payer: COMMERCIAL

## 2024-11-27 ENCOUNTER — OFFICE VISIT (OUTPATIENT)
Dept: ORTHOPEDICS | Facility: CLINIC | Age: 64
End: 2024-11-27
Payer: COMMERCIAL

## 2024-11-27 ENCOUNTER — TELEPHONE (OUTPATIENT)
Dept: FAMILY MEDICINE | Facility: CLINIC | Age: 64
End: 2024-11-27

## 2024-11-27 ENCOUNTER — DOCUMENTATION ONLY (OUTPATIENT)
Dept: ORTHOPEDICS | Facility: CLINIC | Age: 64
End: 2024-11-27

## 2024-11-27 ENCOUNTER — MEDICAL CORRESPONDENCE (OUTPATIENT)
Dept: HEALTH INFORMATION MANAGEMENT | Facility: CLINIC | Age: 64
End: 2024-11-27

## 2024-11-27 ENCOUNTER — ANTICOAGULATION THERAPY VISIT (OUTPATIENT)
Dept: ANTICOAGULATION | Facility: CLINIC | Age: 64
End: 2024-11-27

## 2024-11-27 DIAGNOSIS — M17.11 PRIMARY OSTEOARTHRITIS OF RIGHT KNEE: ICD-10-CM

## 2024-11-27 DIAGNOSIS — Z85.850 HISTORY OF THYROID CANCER: ICD-10-CM

## 2024-11-27 DIAGNOSIS — Z79.01 LONG TERM (CURRENT) USE OF ANTICOAGULANTS: ICD-10-CM

## 2024-11-27 DIAGNOSIS — S82.191A OTHER CLOSED FRACTURE OF PROXIMAL END OF RIGHT TIBIA, INITIAL ENCOUNTER: Primary | ICD-10-CM

## 2024-11-27 DIAGNOSIS — E66.01 MORBID OBESITY (H): ICD-10-CM

## 2024-11-27 DIAGNOSIS — S82.191S OTHER CLOSED FRACTURE OF PROXIMAL END OF RIGHT TIBIA, SEQUELA: Primary | ICD-10-CM

## 2024-11-27 DIAGNOSIS — Z86.711 HISTORY OF PULMONARY EMBOLISM: ICD-10-CM

## 2024-11-27 DIAGNOSIS — R25.1 TREMOR: ICD-10-CM

## 2024-11-27 DIAGNOSIS — I26.99 PULMONARY EMBOLISM WITH INFARCTION (H): ICD-10-CM

## 2024-11-27 DIAGNOSIS — I26.99 PULMONARY EMBOLISM WITH INFARCTION (H): Primary | ICD-10-CM

## 2024-11-27 DIAGNOSIS — S83.241A ACUTE MEDIAL MENISCUS TEAR OF RIGHT KNEE, INITIAL ENCOUNTER: ICD-10-CM

## 2024-11-27 DIAGNOSIS — S83.411A SPRAIN OF MEDIAL COLLATERAL LIGAMENT OF RIGHT KNEE, INITIAL ENCOUNTER: ICD-10-CM

## 2024-11-27 LAB — INR (EXTERNAL): 3.5 (ref 0.9–1.1)

## 2024-11-27 NOTE — PATIENT INSTRUCTIONS
Thank you for allowing me to be part of your care team. My personal goal for your visit today is that you felt that I listened to you, you understood your diagnosis and treatment options and our staff/clinic met your expectations. We strive to provide you excellent care.  If you felt like your expectations were not met at your visit today or if you have further questions about your visit or care, please send me a [employer] message and I would be happy answer any questions or listen to your feedback.  If you do not have [employer], you can call [phone] to speak with me.      Today we discussed the underlying etiology/pathology of patient's   1. closed fracture of proximal end of right tibia (Nondisplaced/incomplete, mildly comminuted fracture of the tibial eminence and proximal metaphysis), initial encounter DOI: 09/28/24    2. Morbid obesity (H)  BMI: 57    3. medial meniscus tear (radial tear with extrusion) of right knee, initial encounter    4. Primary osteoarthritis of right knee (moderate medial and mild PF joints)    5. Sprain of medial collateral ligament of right knee, grade 2, initial encounter    6. Long term (current) use of anticoagulants- warfarin    7. Pulmonary embolism with infarction (H)    8. History of thyroid cancer    9. Tremor- hands        Today a shared decision making model was used. The patient's values and choices were respected. The following information represents what was discussed and decided upon by the provider and the patient.  -We discussed the patient had an acute fall at home on 09/28/2024 resulting in an injury to her right leg.  - We discussed the patient normally is a minimal ambulator and either utilizes a wheelchair, motorized scooter, walker or holding onto objects to move around her apartment.  - Patient had an MRI of her right knee confirming a nondisplaced incomplete mildly comminuted fracture of the proximal tibia eminence and metaphysis with bone edema which may be  treated nonsurgically.  We discussed that this injury/fracture or bone may take anywhere between 3-5 months to slowly improve.  The mainstay of treatment for this is to minimize weight to her right lower extremity which may cause further injury, delayed healing and chronic pain.  Patient should utilize at a minimum I quad cane with her left hand with ambulation.  A walker would be safer due to her history of falls but due to her tight apartment spaces she states that a walker and wheelchair do not allow her to mobilize within her apartment.  Patient should minimize all weightbearing outside of the house and utilize her motorized scooter.  - Patient also has associated grade 2 MCL sprain with pain on the medial side of the knee and some slight laxity which again will be treated nonoperatively.  Order placed today for her to meet in the future with the orthotic department for custom hinged right knee brace to protect her MCL.  Patient should wear the brace at all times as she does frequently need to get up to use the restroom at night.  Patient understands that this MCL injury likely will take 8 to 10 weeks to heal.  I would like to see her back in 8 weeks for repeat assessment for that injury.  - Currently patient is undergoing formal physical therapy at [facility] for her right knee.  No that MRI results are known and patient needs to be minimal weightbearing on her leg and she has acceptable range of motion and is a minimal ambulator I do not believe that formal physical therapy is warranted at this time until patient has interval healing of her fracture and MCL injury.  Physical therapy may be put on hold at this time.  - Patient understood that she does have chronic underlying osteoarthritis of her right knee as well as a meniscus tear.  At this time I do not believe that this is largely contributing to her current condition.  In the future if she continues to have medial knee pain could be consideration for  ultrasound-guided corticosteroid injection to treat her arthritis and meniscus tear.  - We discussed patient's current weight and BMI of 57.  We discussed that due to her BMI that she is not a surgical candidate for elective procedures.  Patient will continue to work with her primary care team on weight loss.  Patient does have a history of thyroid cancer which excludes her from use of GLP-1 medicines.  Patient states that she has met with bariatrics but they would like her to lose 25 pounds before considering bariatric surgery.  This is unrealistic as patient now will be unable to mobilize due to her right leg injury and will not be able to exercise.  I would asked that her primary care provider again reengage with bariatrics about considering gastric bypass surgery as patient likely will need surgical intervention in the future for other developing joint conditions.  - Patient to be contacted by the orthotic department in the near future to be fitted for a right leg hinged knee brace.  Knee brace will allow full range of motion but will protect her MCL injury.  - In regards to pain management if she does not feel like she is controlled well I would recommend consultation with the pain team as patient already is on Cymbalta.  Pain team consultation placed today for patient to meet with them to determine appropriate pain management strategies.  She states that Cymbalta seems to contribute to her sense of instability and falls.  She states that this has been addressed with her primary care team but there was still would like her on a low-dose of this medicine.  -We did discuss that due to her chronic anticoagulation with warfarin healing that healing from the bone edema may certainly take longer due to continued blood congestion within the fracture pattern.  Patient needs to remain on her warfarin due to history of pulmonary embolism/clotting events.  - I would like to see the patient back in 8 weeks for repeat  assessment.  X-rays are unlikely to be needed at that visit.    - Appointment line phone number is [[phone]]     Tesfaye Cantor PA-C  [facility]    This note was completed in part using a voice recognition software, any grammatical or context distortion are unintentional and inherent to the software.     IMPRESSION:    1. Nondisplaced/incomplete, mildly comminuted fracture of the tibial eminence and proximal metaphysis, with moderate surrounding bone marrow edema.    2. Full-thickness radial tearing of the posterior horn/root of the medial meniscus measuring 1.1 cm, with 6 mm of meniscal extrusion. This tear is expected to diminish the intrinsic meniscal load sharing function.    3. Grade 2 MCL sprain with low-grade partial tearing of the superficial MCL, proximally.    4. Large knee joint effusion. No popliteal (Baker's) cyst.    5. Mild-moderate osteoarthritis of the medial compartment.    6. Minimal osteoarthritis of the patellofemoral compartment.    7. No ACL, PCL, or LCL sprain/tear.    8. No lateral meniscal tear or significant osteochondral abnormality of the lateral compartment.

## 2024-11-27 NOTE — PROGRESS NOTES
ANTICOAGULATION MANAGEMENT     Valentina Olmedo 64 year old female is on warfarin with supratherapeutic INR result. (Goal INR 2.0-3.0)    Recent labs: (last 7 days)     11/27/24  1220   INR 3.5*       ASSESSMENT     Source(s): Chart Review, Patient/Caregiver Call, and Home Care/Facility Nurse     Warfarin doses taken: Warfarin taken as instructed  Diet: No new diet changes identified  Medication/supplement changes: None noted  New illness, injury, or hospitalization: No  Signs or symptoms of bleeding or clotting: No  Previous result: Therapeutic last visit; previously outside of goal range  Additional findings: None and Quit smoking 1.5 weeks ago again.       PLAN     Recommended plan for no diet, medication or health factor changes affecting INR     Dosing Instructions: decrease your warfarin dose (8.3% change) with next INR in 1 week       Summary  As of 11/27/2024      Full warfarin instructions:  10 mg every Sun; 7.5 mg all other days   Next INR check:  12/4/2024               Telephone call with [facility] home care nurse who agrees to plan and repeated back plan correctly    Orders given to  Homecare nurse/facility to recheck    Education provided: None required    Plan made per [facility] anticoagulation protocol    Kindra Garay RN  11/27/2024  Anticoagulation Clinic  [employer] for routing messages: raquel MATHUR  [facility] patient phone line: [phone]        _______________________________________________________________________     Anticoagulation Episode Summary       Current INR goal:  2.0-3.0   TTR:  56.4% (1 y)   Target end date:  Indefinite   Send INR reminders to:  PIPO MATHUR    Indications    History of pulmonary embolism (Resolved) [Z86.711]  Pulmonary embolism with infarction (H) [I26.99]  Long term (current) use of anticoagulants [Z79.01]  History of pulmonary embolism [Z86.711]             Comments:  --             Anticoagulation Care Providers       Provider Role Specialty Phone number     Promise Vanegas MD Referring Family Medicine [phone]    Donald Rooney MD Referring Internal Medicine [phone]    Jose Maria Morin MD Referring Family Medicine [phone]

## 2024-11-27 NOTE — LETTER
11/27/2024      Valentina Olmedo  [address]  [facility] [address]      Dear Colleague,    Thank you for referring your patient, Valentina Olmedo, to the [facility]. Please see a copy of my visit note below.    ASSESSMENT & PLAN     Today we discussed the underlying etiology/pathology of patient's   1. closed fracture of proximal end of right tibia (Nondisplaced/incomplete, mildly comminuted fracture of the tibial eminence and proximal metaphysis), initial encounter DOI: 09/28/24    2. Morbid obesity (H)  BMI: 57    3. medial meniscus tear (radial tear with extrusion) of right knee, initial encounter    4. Primary osteoarthritis of right knee (moderate medial and mild PF joints)    5. Sprain of medial collateral ligament of right knee, grade 2, initial encounter    6. Long term (current) use of anticoagulants- warfarin    7. Pulmonary embolism with infarction (H)    8. History of thyroid cancer    9. Tremor- hands        Today a shared decision making model was used. The patient's values and choices were respected. The following information represents what was discussed and decided upon by the provider and the patient.  -We discussed the patient had an acute fall at home on 09/28/2024 resulting in an injury to her right leg.  - We discussed the patient normally is a minimal ambulator and either utilizes a wheelchair, motorized scooter, walker or holding onto objects to move around her apartment.  - Patient had an MRI of her right knee confirming a nondisplaced incomplete mildly comminuted fracture of the proximal tibia eminence and metaphysis with bone edema which may be treated nonsurgically.  We discussed that this injury/fracture or bone may take anywhere between 3-5 months to slowly improve.  The mainstay of treatment for this is to minimize weight to her right lower extremity which may cause further injury, delayed healing and chronic pain.  Patient  should utilize at a minimum I quad cane with her left hand with ambulation.  A walker would be safer due to her history of falls but due to her tight apartment spaces she states that a walker and wheelchair do not allow her to mobilize within her apartment.  Patient should minimize all weightbearing outside of the house and utilize her motorized scooter.  - Patient also has associated grade 2 MCL sprain with pain on the medial side of the knee and some slight laxity which again will be treated nonoperatively.  Order placed today for her to meet in the future with the orthotic department for custom hinged right knee brace to protect her MCL.  Patient should wear the brace at all times as she does frequently need to get up to use the restroom at night.  Patient understands that this MCL injury likely will take 8 to 10 weeks to heal.  I would like to see her back in 8 weeks for repeat assessment for that injury.  - Currently patient is undergoing formal physical therapy at [facility] for her right knee.  No that MRI results are known and patient needs to be minimal weightbearing on her leg and she has acceptable range of motion and is a minimal ambulator I do not believe that formal physical therapy is warranted at this time until patient has interval healing of her fracture and MCL injury.  Physical therapy may be put on hold at this time.  - Patient understood that she does have chronic underlying osteoarthritis of her right knee as well as a meniscus tear.  At this time I do not believe that this is largely contributing to her current condition.  In the future if she continues to have medial knee pain could be consideration for ultrasound-guided corticosteroid injection to treat her arthritis and meniscus tear.  - We discussed patient's current weight and BMI of 57.  We discussed that due to her BMI that she is not a surgical candidate for elective procedures.  Patient will continue to work with her primary care  team on weight loss.  Patient does have a history of thyroid cancer which excludes her from use of GLP-1 medicines.  Patient states that she has met with bariatrics but they would like her to lose 25 pounds before considering bariatric surgery.  This is unrealistic as patient now will be unable to mobilize due to her right leg injury and will not be able to exercise.  I would asked that her primary care provider again reengage with bariatrics about considering gastric bypass surgery as patient likely will need surgical intervention in the future for other developing joint conditions.  - Patient to be contacted by the orthotic department in the near future to be fitted for a right leg hinged knee brace.  Knee brace will allow full range of motion but will protect her MCL injury.  - In regards to pain management if she does not feel like she is controlled well I would recommend consultation with the pain team as patient already is on Cymbalta.  Pain team consultation placed today for patient to meet with them to determine appropriate pain management strategies.  She states that Cymbalta seems to contribute to her sense of instability and falls.  She states that this has been addressed with her primary care team but there was still would like her on a low-dose of this medicine.  -We did discuss that due to her chronic anticoagulation with warfarin healing that healing from the bone edema may certainly take longer due to continued blood congestion within the fracture pattern.  Patient needs to remain on her warfarin due to history of pulmonary embolism/clotting events.  - I would like to see the patient back in 8 weeks for repeat assessment.  X-rays are unlikely to be needed at that visit.    - Appointment line phone number is [[phone]]     Tesfaye Cantor PA-C  [facility]    This note was completed in part using a voice recognition software, any grammatical or context distortion are  unintentional and inherent to the software.     IMPRESSION:    1. Nondisplaced/incomplete, mildly comminuted fracture of the tibial eminence and proximal metaphysis, with moderate surrounding bone marrow edema.    2. Full-thickness radial tearing of the posterior horn/root of the medial meniscus measuring 1.1 cm, with 6 mm of meniscal extrusion. This tear is expected to diminish the intrinsic meniscal load sharing function.    3. Grade 2 MCL sprain with low-grade partial tearing of the superficial MCL, proximally.    4. Large knee joint effusion. No popliteal (Baker's) cyst.    5. Mild-moderate osteoarthritis of the medial compartment.    6. Minimal osteoarthritis of the patellofemoral compartment.    7. No ACL, PCL, or LCL sprain/tear.    8. No lateral meniscal tear or significant osteochondral abnormality of the lateral compartment.          SUBJECTIVE  Valentina Olmedo is a/an 64 year old female who is seen in consultation at the request of  Jose Maria Morin M.D. for evaluation of right knee pain. The patient is seen with their .    Expanded HPI: Patient states that on 09/28/2024 patient fell inside her home injuring her right knee.  She continued to have chronic pain and discomfort and was sent for a recent MRI with results available below.  Patient lives in a small apartment and states that she largely is a minimal ambulator.  When moving around her apartment it is small enough that she is unable to use a wheelchair or walker.  She usually holds onto countertops or other objects to help with mobility.  She has significant right knee pain more so over the anterior and medial aspect of the knee.  Patient is on chronic Cymbalta for pain management as she states that this likely contributes to some of her falls and dizziness issues.  She also is on chronic warfarin due to history of PEs.  Patient today is accompanied by her .  Patient presents in a wheelchair.    Onset: 2 month(s) ago - 9/28/24.  Patient describes injury as a fall after getting out of bed and becoming dizzy  Location of Pain: right knee diffusely - complains of pain primarily on the anteromedial side  Rating of Pain at worst: 20-40/10  Rating of Pain Currently: 40/10  Worsened by: pressure, weight bearing  Better with: nothing  Treatments tried: rest/activity avoidance, ice, and heat  Quality: none, sharp, shooting  Associated symptoms: swelling, warmth, redness, and weakness of the knee  Orthopedic history: NO  Relevant surgical history: NO  Social history: social history: Smoker: 1/2 PPD - quit about 1.5 weeks ago    Past Medical History:   Diagnosis Date     Adrenal mass (H) 10/12/2015    Chen Null MD  They were incidentally discovered on the CAT scan of the abdomen from December 2011 which was done for evaluation of kidney stones. F/up CT of the abdomen done on 6/26/12 showed a stable 5 mm nodular opacity in the right lower lung lobe, adjacent to the diagram. Bilateral adrenal masses average density measured less than 0 Hounsfield units, consistent with benign etio     Age-related cataract of both eyes, unspecified age-related cataract type 03/27/2024     Anxiety      Anxiety      Arthritis      Borderline personality disorder (H)      Cancer (H)      COPD (chronic obstructive pulmonary disease) (H)      Depression      Depressive disorder      History of COVID-19      Hyperlipidemia      Hypertension      Hypertension      Hyponatremia      Hypothyroidism      Malignant neoplasm of thyroid gland (H)     Chen Null MD  She had R hemithyroidectomy for a right neck tumor in 1994. According to the patient, the tumor was benign. She is not sure whether or not the tumor belonged to the thyroid gland. The surgery was done here at the [place]. In January 2011, she was diagnosed with kidney stones. She was found to have an elevated calcium level of 11.7, with a PTH level of 368. Stone an     Primary  hyperparathyroidism (H)            PTSD (post-traumatic stress disorder)      Pulmonary embolism with infarction (H) 01/12/2021     Recurrent otitis media      Seizure disorder (H)      Sleep apnea     cpap at [facility]     Stroke (H)      Tobacco abuse      Vertigo      Social History     Socioeconomic History     Marital status:    Tobacco Use     Smoking status: Former     Types: Cigarettes     Passive exposure: Never     Smokeless tobacco: Never     Tobacco comments:     Quit about 5 days ago per pt   Vaping Use     Vaping status: Never Used   Substance and Sexual Activity     Alcohol use: No     Comment: None.     Drug use: No     Sexual activity: Not Currently     Partners: Male     Social Drivers of Health     Financial Resource Strain: High Risk (1/3/2024)    Financial Resource Strain      Within the past 12 months, have you or your family members you live with been unable to get utilities (heat, electricity) when it was really needed?: Yes   Food Insecurity: Food Insecurity Present (6/15/2024)    Received from [employer]    Hunger Vital Sign      Worried About Running Out of Food in the Last Year: Sometimes true      Ran Out of Food in the Last Year: Sometimes true   Transportation Needs: No Transportation Needs (6/15/2024)    Received from [employer]    PRAPARE - Transportation      Lack of Transportation (Medical): No      Lack of Transportation (Non-Medical): No   Interpersonal Safety: Not At Risk (9/30/2024)    Received from [employer]    Humiliation, Afraid, Rape, and Kick questionnaire      Fear of Current or Ex-Partner: No      Emotionally Abused: No      Physically Abused: No      Sexually Abused: No   Housing Stability: High Risk (6/15/2024)    Received from [employer]    Housing Stability Vital Sign      Unable to Pay for Housing in the Last Year: Yes      Unstable Housing in the Last Year: No         Patient's past medical, surgical, social, and family histories were personally  reviewed today and no changes are noted.    REVIEW OF SYSTEMS:  10 point ROS is negative other than symptoms noted above in HPI, Past Medical History or as stated below  Constitutional: NEGATIVE for fever, chills, change in weight  Skin: NEGATIVE for worrisome rashes, moles or lesions  GI/: NEGATIVE for bowel or bladder changes  Neuro: NEGATIVE for weakness, dizziness or paresthesias    OBJECTIVE:  Vital signs as noted in EPIC for 11/22/2024  General: healthy, alert and in no distress  HEENT: no scleral icterus or conjunctival erythema  Skin: no suspicious lesions or rash. No jaundice.  CV: no pedal edema  Resp: normal respiratory effort without conversational dyspnea   Psych: normal mood and affect  Neuro: Normal light sensory exam of lower extremity      MSK:  Exam shows a morbidly obese 64-year-old female who presents in a bariatric wheelchair.  She is accompanied by her .  Patient is somewhat tearful and emotionally liable.  She is alert and orientated x 3.  Examination of both lower extremities with skin exposed shows previous lateral surgical incision over the proximal fibula which patient states was a evacuation of a hematoma from a fall in August 2024.  This is well-healed.  Patient also has a tattoo in the right lower extremity.  No obvious ecchymotic changes noted.  Any type of soft tissue swelling is difficult to distinguish due to patient's body habitus with BMI of 57.  Passively I can get her out to full knee extension but she has increased discomfort in the knee.  Flexion is past 115 degrees with no significant pain.  There is tenderness along the medial side of her knee as well as over the anterior proximal tibia.  No pain laterally.  Ligament exam shows slight laxity of the MCL with valgus stressing which generates pain medially.  No pain over the LCL.  Anterior and posterior drawer are not obtainable due to the patient's body habitus and leg size.  Gentle passive range of motion of the hip  is pain-free and full without reproduction of right hemipelvic pain or right knee pain.  She shows adequate motor tone currently of the quadriceps and hamstrings.  Calf is nontender to palpation.  She can actively plantarflex and dorsiflex the foot and ankle.  She is sensory wise states equal sensory dermatome pattern to L2-S1 bilaterally.            Personal Independent visualization of the below images done today:  Patient's right knee MRI is personally reviewed and reviewed with the patient and patient's  today.  All questions and concerns were addressed.  I agree with interpretation below.    EXAM: MRI of the RIGHT KNEE, without contrast    CLINICAL INFORMATION: Female, 64 years old, with sharp right knee pain.    INDICATION: Evaluate knee pain.    PRIOR SURGERY: None reported.    PLAIN FILMS: None available.    COMPARISONS: No prior MRIs available.    TECHNICAL INFORMATION: Using a 1.5T MR scanner and a localizing surface coil:    sagittals: PD, PDFS    coronals: PD, T2FS    axials: PD, PDFS    SEDATION: None    CONTRAST: None    FINDINGS:    Knee joint:    Effusion: Large right knee effusion.    Popliteal cyst: None.    Loose bodies: None.    Subcutaneous and extra-articular soft tissues: Unremarkable.      Ligaments:    ACL: Intact ACL anteromedial and posterolateral bundles, without sprain or tear.    PCL: Intact PCL, without acute or chronic injury.    MCL: Moderate periligamentous edema is present along the superficial MCL with low-grade partial-thickness tearing of the proximal fibers (coronal T2FS series 9 image 19 and axial PDFS series 5 images 15-19).    LCL: Intact LCL, without injury.    Posterolateral corner:    Mild popliteus tendinopathy of mild-moderate edema in the popliteus muscle belly. Biceps femoris, iliotibial band, popliteofibular ligament and lateral gastrocnemius are intact.    Posteromedial corner:    Mild semimembranosus tendinopathy, without tear. Pes anserine tendons and  posterior oblique ligament are without injury, tendinopathy or bursitis.    Extensor mechanism:    Patellar tendon: Intact, without tendinopathy.    Quadriceps tendon: Intact, without tendinopathy.    Retinacula: Medial and lateral retinacula are intact.    Fat pads: Unremarkable infrapatellar Hoffa's, quadriceps and prefemoral fat pads.    Medial compartment:    Medial meniscus: Full thickness radial tear of the medial meniscal posterior root measuring 1.1 cm (sagittal PDFS series 7 images 16-19). Associated meniscal extrusion measures 6 mm. No parameniscal cyst.    Medial femoral condyle & tibial plateau: Broad-based grade III chondromalacia throughout the central, weightbearing aspect of the medial compartment, with mild marginal osteophytosis.    Lateral compartment:    Lateral meniscus: No articular surface, meniscosynovial junction or root tear. No displacement, extrusion or parameniscal cyst.    Lateral femoral condyle & tibial plateau: Mild signal heterogeneity, surface irregularity, and thinning of the articular cartilage without full-thickness chondral loss or reactive osseous changes.    Patellofemoral joint:    Patella: Generalized grade II chondromalacia of the patella. Minimal marginal osteophytosis.    Trochlea: Broad-based grade II chondromalacia of the medial facet and central sulcus of the trochlea.    Proximal tibiofibular joint:    Unremarkable, without evidence of ligament sprain injury, joint effusion or adjacent marrow edema.    Bones:    Nondisplaced/incomplete fracture of the tibial eminence and proximal metaphysis with fracture planes extending through the medial and lateral aspects of the metaphysis, anteriorly (axial PD series 4 and axial PDFS series 5 image 30 and coronal PD series 8 and coronal T2FS series 9 images 13-23). Moderate bone marrow edema is present.    IMPRESSION:    1. Nondisplaced/incomplete, mildly comminuted fracture of the tibial eminence and proximal metaphysis, with  moderate surrounding bone marrow edema.    2. Full-thickness radial tearing of the posterior horn/root of the medial meniscus measuring 1.1 cm, with 6 mm of meniscal extrusion. This tear is expected to diminish the intrinsic meniscal load sharing function.    3. Grade 2 MCL sprain with low-grade partial tearing of the superficial MCL, proximally.    4. Large knee joint effusion. No popliteal (Baker's) cyst.    5. Mild-moderate osteoarthritis of the medial compartment.    6. Minimal osteoarthritis of the patellofemoral compartment.    7. No ACL, PCL, or LCL sprain/tear.    8. No lateral meniscal tear or significant osteochondral abnormality of the lateral compartment.    BC        Electronically signed on 11/21/2024 7:09:00 AM by Bhupendra Guthrie M.D.  Radiologist Signature  Patient's conditions were thoroughly discussed during today's visit with total time reviewing patient's previous medical records/history/radiology, face-to-face examination and discussion and plan of care with the patient and documentation being 75 minutes for today's clinical visit  Tesfaye Cantor PA-C  [facility]    This note was completed in part using a voice recognition software, any grammatical or context distortion are unintentional and inherent to the software.       Again, thank you for allowing me to participate in the care of your patient.        Sincerely,        Tesfaye Cantor PA-C

## 2024-11-27 NOTE — TELEPHONE ENCOUNTER
Mirta (RN) calling to clarify dosage of LYRICA 25 MG prescription. RN relayed sig of (1) in the morning and (2) in the evening.     In addition, Mirta asked if patient PCP could sign patients current medication list and fax to [facility].     FAX: [phone]    Tyson Almanzar RN

## 2024-12-03 ENCOUNTER — ANTICOAGULATION THERAPY VISIT (OUTPATIENT)
Dept: ANTICOAGULATION | Facility: CLINIC | Age: 64
End: 2024-12-03
Payer: COMMERCIAL

## 2024-12-03 DIAGNOSIS — I26.99 PULMONARY EMBOLISM WITH INFARCTION (H): Primary | ICD-10-CM

## 2024-12-03 DIAGNOSIS — Z79.01 LONG TERM (CURRENT) USE OF ANTICOAGULANTS: ICD-10-CM

## 2024-12-03 DIAGNOSIS — Z86.711 HISTORY OF PULMONARY EMBOLISM: ICD-10-CM

## 2024-12-03 LAB — INR (EXTERNAL): 3.1 (ref 0.9–1.1)

## 2024-12-03 NOTE — PROGRESS NOTES
ANTICOAGULATION MANAGEMENT     Valentina Olmedo 64 year old female is on warfarin with supratherapeutic INR result. (Goal INR 2.0-3.0)    Recent labs: (last 7 days)     12/03/24  1233   INR 3.1*       ASSESSMENT     Source(s): Chart Review and Home Care/Facility Nurse     Warfarin doses taken: Warfarin taken as instructed  Diet: No new diet changes identified  Medication/supplement changes: None noted  New illness, injury, or hospitalization: No  Signs or symptoms of bleeding or clotting: No  Previous result: Supratherapeutic  Additional findings: None       PLAN     Recommended plan for no diet, medication or health factor changes affecting INR     Dosing Instructions: partial hold then decrease your warfarin dose (5% change) with next INR in 1 week       Summary  As of 12/3/2024      Full warfarin instructions:  12/3: 5 mg; Otherwise 7.5 mg every day   Next INR check:  12/10/2024               Telephone call with Mirta home care nurse who verbalizes understanding and agrees to plan    Orders given to  Homecare nurse/facility to recheck    Education provided: Please call back if any changes to your diet, medications or how you've been taking warfarin  Symptom monitoring: monitoring for bleeding signs and symptoms, monitoring for clotting signs and symptoms, and monitoring for stroke signs and symptoms    Plan made per [facility] anticoagulation protocol    Zari Cheng RN  12/3/2024  Anticoagulation Clinic  [facility] for routing messages: raquel MATHUR  [facility] patient phone line: [phone]        _______________________________________________________________________     Anticoagulation Episode Summary       Current INR goal:  2.0-3.0   TTR:  56.4% (1 y)   Target end date:  Indefinite   Send INR reminders to:  PIPO MATHUR    Indications    History of pulmonary embolism (Resolved) [Z86.711]  Pulmonary embolism with infarction (H) [I26.99]  Long term (current) use of anticoagulants [Z79.01]  History of pulmonary  embolism [Z86.711]             Comments:  --             Anticoagulation Care Providers       Provider Role Specialty Phone number    Promise Vanegas MD Referring Family Medicine [phone]    Donald Rooney MD Referring Internal Medicine [phone]    Jose Maria Morin MD Referring Family Medicine [phone]

## 2024-12-04 ENCOUNTER — TELEPHONE (OUTPATIENT)
Dept: FAMILY MEDICINE | Facility: CLINIC | Age: 64
End: 2024-12-04
Payer: COMMERCIAL

## 2024-12-04 DIAGNOSIS — M48.02 CERVICAL STENOSIS OF SPINAL CANAL: ICD-10-CM

## 2024-12-04 NOTE — TELEPHONE ENCOUNTER
"  Home Care is calling regarding an established patient with [facility].       Requesting orders from: Jose Maria Morin  Provider is following patient: Yes  Is this a 60-day recertification request?  Yes    Orders Requested    Skilled Nursing  Request for recertification   Frequency:  1x/wk for 8 wks for medication management and 2 PRN visits \"in case her MD2 machine acts up.\"    Also requesting most current updated signed medication list be faxed to them at [phone] - med list printed and placed on provider desk to sign.        Information was gathered and will be sent to provider for review.  RN will contact Home Care with information after provider review.  Confirmed ok to leave a detailed message with call back.  Contact information confirmed and updated as needed.    Paula Rollins RN   "

## 2024-12-04 NOTE — CONFIDENTIAL NOTE
Agree with med change. Depending on response she could take tid which might preserve intended benefit while mitigating side effect.

## 2024-12-05 ENCOUNTER — OFFICE VISIT (OUTPATIENT)
Dept: PULMONOLOGY | Facility: CLINIC | Age: 64
End: 2024-12-05
Attending: INTERNAL MEDICINE
Payer: COMMERCIAL

## 2024-12-05 VITALS — OXYGEN SATURATION: 95 % | DIASTOLIC BLOOD PRESSURE: 84 MMHG | SYSTOLIC BLOOD PRESSURE: 134 MMHG | HEART RATE: 76 BPM

## 2024-12-05 DIAGNOSIS — G47.33 OSA (OBSTRUCTIVE SLEEP APNEA): ICD-10-CM

## 2024-12-05 DIAGNOSIS — J45.30 MILD PERSISTENT ASTHMA WITHOUT COMPLICATION: ICD-10-CM

## 2024-12-05 DIAGNOSIS — R06.09 DYSPNEA ON EXERTION: Primary | ICD-10-CM

## 2024-12-05 DIAGNOSIS — J44.9 CHRONIC OBSTRUCTIVE PULMONARY DISEASE, UNSPECIFIED COPD TYPE (H): ICD-10-CM

## 2024-12-05 DIAGNOSIS — R29.898 SEVERE MUSCLE DECONDITIONING: ICD-10-CM

## 2024-12-05 PROCEDURE — 95012 NITRIC OXIDE EXP GAS DETER: CPT | Performed by: INTERNAL MEDICINE

## 2024-12-05 PROCEDURE — 99214 OFFICE O/P EST MOD 30 MIN: CPT | Mod: 25 | Performed by: INTERNAL MEDICINE

## 2024-12-05 RX ORDER — ALBUTEROL SULFATE 90 UG/1
2 INHALANT RESPIRATORY (INHALATION) EVERY 4 HOURS PRN
Qty: 18 G | Refills: 12 | Status: SHIPPED | OUTPATIENT
Start: 2024-12-05

## 2024-12-05 RX ORDER — FUROSEMIDE 20 MG/1
20 TABLET ORAL DAILY
Qty: 30 TABLET | Refills: 12 | Status: SHIPPED | OUTPATIENT
Start: 2024-12-05

## 2024-12-05 ASSESSMENT — ASTHMA QUESTIONNAIRES
QUESTION_4 LAST FOUR WEEKS HOW OFTEN HAVE YOU USED YOUR RESCUE INHALER OR NEBULIZER MEDICATION (SUCH AS ALBUTEROL): ONE OR TWO TIMES PER DAY
ACT_TOTALSCORE: 12
QUESTION_5 LAST FOUR WEEKS HOW WOULD YOU RATE YOUR ASTHMA CONTROL: SOMEWHAT CONTROLLED
QUESTION_3 LAST FOUR WEEKS HOW OFTEN DID YOUR ASTHMA SYMPTOMS (WHEEZING, COUGHING, SHORTNESS OF BREATH, CHEST TIGHTNESS OR PAIN) WAKE YOU UP AT NIGHT OR EARLIER THAN USUAL IN THE MORNING: FOUR OR MORE NIGHTS A WEEK
QUESTION_2 LAST FOUR WEEKS HOW OFTEN HAVE YOU HAD SHORTNESS OF BREATH: THREE TO SIX TIMES A WEEK
QUESTION_1 LAST FOUR WEEKS HOW MUCH OF THE TIME DID YOUR ASTHMA KEEP YOU FROM GETTING AS MUCH DONE AT WORK, SCHOOL OR AT HOME: SOME OF THE TIME
ACT_TOTALSCORE: 12

## 2024-12-05 NOTE — PATIENT INSTRUCTIONS
ADVAIR 230/21 two puffs twice a day, rinse your mouth with water after each use  Continue albuterol HFA or duonebs every six hours as needed  New autoCPAP machine 5-20 cmH2O  Avoid eating close to bedtime  Raise the head of the bed  Eat in the upright position   Continue nexium daily and increase to twice a day if persistent acid reflux symptoms  Start lasix 20 mg daily   Follow up LABS basic chemistry and magnesium in one week  Pulmonary rehabilitation   Follow up 6 months

## 2024-12-05 NOTE — PROGRESS NOTES
"  Assessment & Plan     Dyspnea on exertion  Mild persistent asthma without complication  Chronic obstructive pulmonary disease, unspecified COPD type (H)  CHRIS (obstructive sleep apnea)  Severe muscle deconditioning  Here for follow up of dyspnea on exertion. This is in setting of morbid obesity w/ BMI of 57, asthma/COPD overlap, and CHRIS not currently on CPAP therapy. Albuterol quite helpful per pt report. CPAP machine broke and was thrown out so no longer using. Unable to attend pulmonary rehab due to RLL injury. No cough or wheezing to suggest symptoms due to COPD/asthma. Wonder if this is pulmonary edema/htn given description of symptoms. Will trial course of diuretic and see if there is interval improvement. BMP/Mg today and repeat in one week after starting lasix. CPAP order placed today. Again discussed GERD precautions, encouraged physically activity when able.  Plan:  ADVAIR 230/21 two puffs twice a day, rinse your mouth with water after each use  Continue albuterol HFA or duonebs every six hours as needed  New autoCPAP machine 5-20 cmH2O  Avoid eating close to bedtime  Raise the head of the bed  Eat in the upright position   Continue nexium daily and increase to twice a day if persistent acid reflux symptoms  Start lasix 20 mg daily   Follow up LABS basic chemistry and magnesium in one week  Pulmonary rehabilitation   Follow up 6 months     Dipesh Rogers MD PGY3    BMI  Estimated body mass index is 57.9 kg/m  as calculated from the following:    Height as of 11/11/24: 1.676 m (5' 6\").    Weight as of 11/11/24: 162.7 kg (358 lb 11.2 oz).     No follow-ups on file.    Subjective   Valentina is a 64 year old, presenting for the following health issues:  Follow Up (Asthma )    HPI     Dyspnea on exertion  Good adherence w/ advair (2 puffs BID). Using albuterol QID  Pulm rehab- has not been able to go since fall resulting fractured tibia. Getting a brace next week   GERD- still present, somewhat improved on " nexium  Had a relapse w/ smoking cigarettes, only a few cigarettes a day May through September, no longer smoking. No cravings.  CPAP machine broke and partner threw it away, has not been using for some time now  No cough or wheezing associated w/ SOB/dyspnea        12/5/2024     2:47 PM   ACT Total Scores   ACT TOTAL SCORE (Goal Greater than or Equal to 20) 12    In the past 12 months, how many times did you visit the emergency room for your asthma without being admitted to the hospital? 0    In the past 12 months, how many times were you hospitalized overnight because of your asthma? 0        Patient-reported     Review of Systems  Constitutional, HEENT, cardiovascular, pulmonary, gi and gu systems are negative, except as otherwise noted.      Objective    /84   Pulse 76   LMP  (LMP Unknown)   SpO2 95%   There is no height or weight on file to calculate BMI.    Physical Exam   GENERAL: alert and no distress  RESP: Breath sounds distant, lungs clear to auscultation - no rales, rhonchi or wheezes  CV: regular rate and rhythm, normal S1 S2, no S3 or S4, no murmur, click or rub, no peripheral edema  ABDOMEN: soft, nontender, no hepatosplenomegaly, no masses and bowel sounds normal  MS: no gross musculoskeletal defects noted, no edema, occasionally stereotyped extremity movements          Signed Electronically by: Sen Balderas MD

## 2024-12-06 NOTE — TELEPHONE ENCOUNTER
"Home Health Care    Reason for call:  Mirta BOO with [facility] requesting verbal order for change in dosage of patient's pregabalin.  Mirta states patient reported her nighttime dose of 2 pregabalin \"is making her dizzy and directly relating the dizziness to the 2 pregabalin at HS,\" and only wants to take 1 in the morning and 1 at night, instead of prescribed 1 in AM and 2 at night.  Please advise on requested pregabalin dose change to 1 in AM and 1 at night.    Pt Provider: Dr. Morin     Phone Number Homecare Nurse can be reached at: RAJEEV Kirby with [facility], [phone].        .Paula Rollins RN  [facility]    "

## 2024-12-06 NOTE — TELEPHONE ENCOUNTER
Called Mirta crabtree RN with [facility], and relayed agreement with requested SN verbal orders, per Dr. Morin, and also informed updated signed medication list was faxed on 12/5/24.        Paula Rollins RN  [facility]

## 2024-12-08 RX ORDER — PREGABALIN 25 MG/1
CAPSULE ORAL 2 TIMES DAILY
COMMUNITY
Start: 2024-12-08 | End: 2024-12-09

## 2024-12-09 ENCOUNTER — TELEPHONE (OUTPATIENT)
Dept: FAMILY MEDICINE | Facility: CLINIC | Age: 64
End: 2024-12-09
Payer: COMMERCIAL

## 2024-12-09 DIAGNOSIS — Z53.9 DIAGNOSIS NOT YET DEFINED: Primary | ICD-10-CM

## 2024-12-09 DIAGNOSIS — M48.02 CERVICAL STENOSIS OF SPINAL CANAL: ICD-10-CM

## 2024-12-09 RX ORDER — PREGABALIN 25 MG/1
25 CAPSULE ORAL 2 TIMES DAILY
Qty: 60 CAPSULE | Refills: 2 | Status: SHIPPED | OUTPATIENT
Start: 2024-12-09

## 2024-12-09 NOTE — CONFIDENTIAL NOTE
The medicine is still on her medication list.  It has not been discontinued.    Skilled nursing requested a dose adjustment based on side effects.  I adjusted it in the EHR.    I suspect the change removed to from the pharmacy.  I will send it in.

## 2024-12-09 NOTE — TELEPHONE ENCOUNTER
Medication Clarification  Medication Question or Refill    Contacts       Contact Date/Time Type Contact Phone/Fax    12/09/2024 08:16 AM CST Phone (Incoming) Shani Pharmacist at [facility] [phone]            What medication are you calling about (include dose and sig)?: Pregabalin    [place] Pharmacy[place]   [facility] [place] [address], [address]  [address] [facility], [facility] [address]  [place] [address]  Phone: [phone] Fax: [phone]        Controlled Substance Agreement on file:   CSA -- Patient Level:    CSA: None found at the patient level.       Who prescribed the medication?: Dr Morin    Do you need a refill? Yes        Do you have any questions or concerns?  Yes: Needed clarification.  Pharmacist would like to know if this medication is discontinued.  It was ordered on 11/21/24 with 3 refills.  Refill was requested.  Medication is now discontinued.  Pharmacist would like confirmation if medication is active or discontinued.

## 2024-12-09 NOTE — TELEPHONE ENCOUNTER
Called and left detailed message for pharmacist with [facility], relayed below message from Dr. Morin as written, and requested call back with any questions.      Paula Rollins RN  [facility]

## 2024-12-09 NOTE — TELEPHONE ENCOUNTER
Please see separate telephone encounter from 12/9/24 for additional information.    Called and left detailed message on confidential voicemail for RAJEEV Kirby with [facility], relaying okay for pregabalin dose change as requested, and new prescription has been sent to pharmacy with this change, per Dr. Morin.        Paula Rollins RN  [facility]

## 2024-12-10 ENCOUNTER — ANTICOAGULATION THERAPY VISIT (OUTPATIENT)
Dept: ANTICOAGULATION | Facility: CLINIC | Age: 64
End: 2024-12-10
Payer: COMMERCIAL

## 2024-12-10 ENCOUNTER — NURSE TRIAGE (OUTPATIENT)
Dept: FAMILY MEDICINE | Facility: CLINIC | Age: 64
End: 2024-12-10
Payer: COMMERCIAL

## 2024-12-10 DIAGNOSIS — I26.99 PULMONARY EMBOLISM WITH INFARCTION (H): Primary | ICD-10-CM

## 2024-12-10 DIAGNOSIS — Z79.01 LONG TERM (CURRENT) USE OF ANTICOAGULANTS: ICD-10-CM

## 2024-12-10 DIAGNOSIS — Z86.711 HISTORY OF PULMONARY EMBOLISM: ICD-10-CM

## 2024-12-10 LAB — INR (EXTERNAL): 3.7 (ref 0.9–1.1)

## 2024-12-10 NOTE — TELEPHONE ENCOUNTER
Patient calling with 24hrs of diarrhea and nausea. Started yesterday 12/9, spouse had similar symptoms just a few days prior.   Patient has had 6 watery stools in last 24hrs. Decreased appetite, nausea and generalized abdomen discomfort, 1/10. Denies fever, vomiting.  Patient denies recent travel, antibiotic use or ingestion of spoiled/contaminated food to her knowledge.     Advised patient home care recommendations of OTC antidiarrheal, rehydration with fluids. Monitor for fever, vomiting, abdominal pain. Call back with new/worsening symptoms. Patient agreeable to plan of care.     Reason for Disposition   MILD-MODERATE diarrhea (e.g., 1-6 times / day more than normal)    Additional Information   Negative: Shock suspected (e.g., cold/pale/clammy skin, too weak to stand, low BP, rapid pulse)   Negative: Difficult to awaken or acting confused (e.g., disoriented, slurred speech)   Negative: Sounds like a life-threatening emergency to the triager   Negative: Vomiting also present and worse than the diarrhea   Negative: Blood in stool and without diarrhea   Negative: Diarrhea begins while taking an antibiotic by mouth (oral antibiotic)   Negative: SEVERE abdominal pain (e.g., excruciating) and present > 1 hour   Negative: SEVERE abdominal pain and age > 60 years   Negative: Bloody, black, or tarry bowel movements  (Exception: Chronic-unchanged black-grey bowel movements and is taking iron pills or Pepto-Bismol.)   Negative: SEVERE diarrhea (e.g., 7 or more times / day more than normal) and present > 24 hours (1 day)   Negative: MODERATE diarrhea (e.g., 4-6 times / day more than normal) and present > 48 hours (2 days)   Negative: MODERATE diarrhea (e.g., 4-6 times / day more than normal) and age > 70 years   Negative: Abdominal pain  (Exceptions: Pain clears completely with each passage of diarrhea stool,  or symptoms similar to previously diagnosed irritable bowel syndrome.)   Negative: Fever > 101 F (38.3 C)    "Negative: Blood in the stool  (Exception: Only on toilet paper. Reason: Diarrhea can cause rectal irritation with blood on wiping.)   Negative: Mucus or pus in stool has been present > 2 days and diarrhea is more than mild   Negative: Weak immune system (e.g., HIV positive, cancer chemo, splenectomy, organ transplant, chronic steroids)   Negative: SEVERE diarrhea (e.g., 7 or more times / day more than normal) and age > 60 years   Negative: Constant abdominal pain lasting > 2 hours   Negative: Drinking very little and dehydration suspected (e.g., no urine > 12 hours, very dry mouth, very lightheaded)   Negative: Patient sounds very sick or weak to the triager   Negative: Travel to a foreign country in past month   Negative: Recent antibiotic therapy (i.e., within last 2 months) and diarrhea present > 3 days since antibiotic was stopped   Negative: Recent hospitalization and diarrhea present > 3 days   Negative: Tube feedings (e.g., nasogastric, g-tube, j-tube)   Negative: MILD diarrhea (e.g., 1-3 or more stools than normal in past 24 hours) diarrhea and present > 7 days  (Exception: Chronic diarrhea that is not worse.)   Negative: Patient wants to be seen   Negative: Diarrhea is a chronic symptom (recurrent or ongoing AND lasting > 4 weeks)   Negative: SEVERE diarrhea (e.g., 7 or more times / day more than normal)    Answer Assessment - Initial Assessment Questions  1. DIARRHEA SEVERITY: \"How bad is the diarrhea?\" \"How many more stools have you had in the past 24 hours than normal?\"       6 watery stools in last 24hrs  2. ONSET: \"When did the diarrhea begin?\"       Yesterday morning, 12/9  3. STOOL DESCRIPTION:  \"How loose or watery is the diarrhea?\" \"What is the stool color?\" \"Is there any blood or mucous in the stool?\"      watery  4. VOMITING: \"Are you also vomiting?\" If Yes, ask: \"How many times in the past 24 hours?\"       no  5. ABDOMEN PAIN: \"Are you having any abdomen pain?\" If Yes, ask: \"What does it feel " "like?\" (e.g., crampy, dull, intermittent, constant)       1/10 generalized abdomen discomfort  6. ABDOMEN PAIN SEVERITY: If present, ask: \"How bad is the pain?\"  (e.g., Scale 1-10; mild, moderate, or severe)      1/10  7. ORAL INTAKE: If vomiting, \"Have you been able to drink liquids?\" \"How much liquids have you had in the past 24 hours?\"      Decreased oral intake of food and fluids  8. HYDRATION: \"Any signs of dehydration?\" (e.g., dry mouth [not just dry lips], too weak to stand, dizziness, new weight loss) \"When did you last urinate?\"      Patient still urinating.   9. EXPOSURE: \"Have you traveled to a foreign country recently?\" \"Have you been exposed to anyone with diarrhea?\" \"Could you have eaten any food that was spoiled?\"      No   10. ANTIBIOTIC USE: \"Are you taking antibiotics now or have you taken antibiotics in the past 2 months?\"        No   11. OTHER SYMPTOMS: \"Do you have any other symptoms?\" (e.g., fever, blood in stool)        Nausea  12. PREGNANCY: \"Is there any chance you are pregnant?\" \"When was your last menstrual period?\"        no    Protocols used: Diarrhea-A-OH    "

## 2024-12-10 NOTE — PROGRESS NOTES
ANTICOAGULATION MANAGEMENT     Valentina Olmedo 64 year old female is on warfarin with supratherapeutic INR result. (Goal INR 2.0-3.0)    Recent labs: (last 7 days)     12/10/24  1117   INR 3.7*       ASSESSMENT     Source(s): Chart Review and Home Care/Facility Nurse     Warfarin doses taken: More warfarin taken than planned which may be affecting INR  Diet: No new diet changes identified  Medication/supplement changes:  furosemide prescribed on 12/5, has not started yet as medication hasn't been delivered  New illness, injury, or hospitalization: yes, diarrhea multiple episodes last 24 hours  Signs or symptoms of bleeding or clotting: No  Previous result: Supratherapeutic  Additional findings: None       PLAN     Recommended plan for temporary change(s) affecting INR     Dosing Instructions: hold dose then continue your current warfarin dose with next INR in 1 week +/- a day   (no change to Maintenance dose as she did not make changes last week so will have her do that this week.)    Summary  As of 12/10/2024      Full warfarin instructions:  12/10: Hold; Otherwise 7.5 mg every day   Next INR check:  12/18/2024               Telephone call with [facility] home care nurse who verbalizes understanding and agrees to plan    Orders given to  Homecare nurse/facility to recheck    Education provided: Please call back if any changes to your diet, medications or how you've been taking warfarin    Plan made per [facility] anticoagulation protocol    Kyleigh Jimenes RN  12/10/2024  Anticoagulation Clinic  [employer] for routing messages: raquel MATHUR  [facility] patient phone line: [phone]        _______________________________________________________________________     Anticoagulation Episode Summary       Current INR goal:  2.0-3.0   TTR:  55.5% (1 y)   Target end date:  Indefinite   Send INR reminders to:  PIPO MATHUR    Indications    History of pulmonary embolism (Resolved) [Z86.711]  Pulmonary embolism with infarction (H)  [I26.99]  Long term (current) use of anticoagulants [Z79.01]  History of pulmonary embolism [Z86.711]             Comments:  --             Anticoagulation Care Providers       Provider Role Specialty Phone number    Promise Vanegas MD Referring Family Medicine [phone]    Donald Rooney MD Referring Internal Medicine [phone]    Jose Maria Morin MD Referring Family Medicine [phone]

## 2024-12-11 ENCOUNTER — TELEPHONE (OUTPATIENT)
Dept: FAMILY MEDICINE | Facility: CLINIC | Age: 64
End: 2024-12-11
Payer: COMMERCIAL

## 2024-12-11 ENCOUNTER — TELEPHONE (OUTPATIENT)
Dept: PULMONOLOGY | Facility: CLINIC | Age: 64
End: 2024-12-11
Payer: COMMERCIAL

## 2024-12-11 ENCOUNTER — MEDICAL CORRESPONDENCE (OUTPATIENT)
Dept: HEALTH INFORMATION MANAGEMENT | Facility: CLINIC | Age: 64
End: 2024-12-11
Payer: COMMERCIAL

## 2024-12-11 DIAGNOSIS — J44.9 CHRONIC OBSTRUCTIVE PULMONARY DISEASE, UNSPECIFIED COPD TYPE (H): Primary | ICD-10-CM

## 2024-12-11 DIAGNOSIS — J45.30 MILD PERSISTENT ASTHMA WITHOUT COMPLICATION: ICD-10-CM

## 2024-12-11 RX ORDER — BUDESONIDE AND FORMOTEROL FUMARATE DIHYDRATE 160; 4.5 UG/1; UG/1
2 AEROSOL RESPIRATORY (INHALATION) 2 TIMES DAILY
Qty: 10.2 G | Refills: 3 | Status: SHIPPED | OUTPATIENT
Start: 2025-01-01

## 2024-12-11 NOTE — TELEPHONE ENCOUNTER
Valentina called back with her cadi waiver information. Please fax her facesheet, office visit notes, and new CPAP order to :  Attn: Jose Morfin  Fax # [phone]    Hopefully he can get this approved and he will have to let us know which medical supply place we can send order for CPAP to.

## 2024-12-11 NOTE — TELEPHONE ENCOUNTER
[facility] Call Center    Phone Message    May a detailed message be left on voicemail: yes     Reason for Call: Other: Valentina called to inform she can not get a cpap because she was dispersed one in 2021 therefore would have to purchase new one out of pocket which she cannot afford. Please call patient asap to assist. She believes without it she could die     Action Taken: Other: Pulm    Travel Screening: Not Applicable

## 2024-12-11 NOTE — TELEPHONE ENCOUNTER
[facility] Call Center    Phone Message    May a detailed message be left on voicemail: yes     Reason for Call: Medication Question or concern regarding medication   Prescription Clarification  Name of Medication: Advair  Prescribing Provider: Emir   Pharmacy: [facility]   What on the order needs clarification? Call received from [employer] insurance reporting that as of January 1st 2025, pt's Advair will no longer be covered under her plan.   Covered alternatives would be Symbicort and Wixela.     Representative is asking that when we send the new prescription, would like for us to add a notation for the pharmacy for them to delete any remaining refills they may have on file for the Advair (just to hopefully avoid any confusion in the future).  For any questions, please call [facility] [employer] at [phone] ext # [phone].    Action Taken: Other: Pulm    Travel Screening: Not Applicable

## 2024-12-11 NOTE — TELEPHONE ENCOUNTER
Order/Referral Request    Who is requesting: Pt    Orders being requested: CPAP    Reason service is needed/diagnosis: NA    When are orders needed by: NA    Has this been discussed with Provider: Yes    Does patient have a preference on a Group/Provider/Facility? NA, Call for name    Does patient have an appointment scheduled?: No    Where to send orders:  Pt hung up before I could ask her any other questions. Didn't have a name of DME company to be sent to. Would just like a call back.    Could we send this information to you in [employer] or would you prefer to receive a phone call?:   No preference   Okay to leave a detailed message?: N/A at Cell number on file:    Telephone Information:   Mobile [phone]

## 2024-12-11 NOTE — TELEPHONE ENCOUNTER
Spoke with Valentina in regards to her insurance not covering the Advair starting January 1st. She will get one more refill of the Advair yet this month. She will then start new Symbicort. Will send new order for Symbicort 160/4.5 mcg per insurance coverage starting 1/1/2025 per Dr. Mallory.

## 2024-12-11 NOTE — TELEPHONE ENCOUNTER
DATE: 12/11/2024  DME PROVIDER: [facility]   SUPPLY ORDERED: c pap supply   PROVIDER: [facility]    Faxed order    Paula Gonzalez LPN

## 2024-12-11 NOTE — TELEPHONE ENCOUNTER
Spoke with Valentina. She is very upset about not being able to get her CPAP. She threw her machine out because it was not working. She received one back in 2021. Insurance will not allow for her to get a new one. [facility] to get [facility] this issue. They inform she can call her insurance and get a cadi waiver to assist her to cover this. However, [facility] not [facility]. Informed Valentina. She informs that she does have a cadi waiver in place. She will call her contact and call us back if they will cover this and what medical equipment  place will take it. She has our direct nurse line number to call back.

## 2024-12-17 ENCOUNTER — ANTICOAGULATION THERAPY VISIT (OUTPATIENT)
Dept: ANTICOAGULATION | Facility: CLINIC | Age: 64
End: 2024-12-17

## 2024-12-17 DIAGNOSIS — Z86.711 HISTORY OF PULMONARY EMBOLISM: ICD-10-CM

## 2024-12-17 DIAGNOSIS — I26.99 PULMONARY EMBOLISM WITH INFARCTION (H): Primary | ICD-10-CM

## 2024-12-17 DIAGNOSIS — Z79.01 LONG TERM (CURRENT) USE OF ANTICOAGULANTS: ICD-10-CM

## 2024-12-17 LAB — INR (EXTERNAL): 3.5 (ref 0.9–1.1)

## 2024-12-17 NOTE — PROGRESS NOTES
ANTICOAGULATION MANAGEMENT     Valentina Olmedo 64 year old female is on warfarin with supratherapeutic INR result. (Goal INR 2.0-3.0)    Recent labs: (last 7 days)     12/17/24  1411   INR 3.5*       ASSESSMENT     Source(s): Chart Review and Home Care/Facility Nurse     Warfarin doses taken: Warfarin taken as instructed  Diet:  decrease in appetite may be affecting diet and INR  Medication/supplement changes:  patient is not going to start the Lasix stating it will cause her to go to the bathroom and she cannot walk that much  New illness, injury, or hospitalization: No  Signs or symptoms of bleeding or clotting: No  Previous result: Supratherapeutic  Additional findings: None       PLAN     Recommended plan for no diet, medication or health factor changes affecting INR     Dosing Instructions: decrease your warfarin dose (9.5% change) with next INR in 6 days       Summary  As of 12/17/2024      Full warfarin instructions:  5 mg every Tue, Fri; 7.5 mg all other days   Next INR check:  12/23/2024               Telephone call with [facility] home care nurse who verbalizes understanding and agrees to plan    Orders given to  Homecare nurse/facility to recheck    Education provided: Please call back if any changes to your diet, medications or how you've been taking warfarin    Plan made per [facility] anticoagulation protocol    Emerita Ca RN  12/17/2024  Anticoagulation Clinic  [employer] for routing messages: raquel MATHUR  [facility] patient phone line: [phone]        _______________________________________________________________________     Anticoagulation Episode Summary       Current INR goal:  2.0-3.0   TTR:  53.6% (1 y)   Target end date:  Indefinite   Send INR reminders to:  PIPO MATHUR    Indications    History of pulmonary embolism (Resolved) [Z86.711]  Pulmonary embolism with infarction (H) [I26.99]  Long term (current) use of anticoagulants [Z79.01]  History of pulmonary embolism [Z86.711]              Comments:  --             Anticoagulation Care Providers       Provider Role Specialty Phone number    Promise Vanegas MD Referring Family Medicine [phone]    Donald Rooney MD Referring Internal Medicine [phone]    Jose Maria Morin MD Referring Family Medicine [phone]

## 2024-12-18 ENCOUNTER — TELEPHONE (OUTPATIENT)
Dept: FAMILY MEDICINE | Facility: CLINIC | Age: 64
End: 2024-12-18

## 2024-12-18 ENCOUNTER — OFFICE VISIT (OUTPATIENT)
Dept: FAMILY MEDICINE | Facility: CLINIC | Age: 64
End: 2024-12-18
Payer: COMMERCIAL

## 2024-12-18 VITALS
TEMPERATURE: 97.8 F | DIASTOLIC BLOOD PRESSURE: 80 MMHG | OXYGEN SATURATION: 95 % | BODY MASS INDEX: 56.23 KG/M2 | WEIGHT: 293 LBS | HEART RATE: 95 BPM | SYSTOLIC BLOOD PRESSURE: 122 MMHG | RESPIRATION RATE: 18 BRPM

## 2024-12-18 DIAGNOSIS — E66.01 CLASS 3 SEVERE OBESITY DUE TO EXCESS CALORIES WITH SERIOUS COMORBIDITY AND BODY MASS INDEX (BMI) OF 50.0 TO 59.9 IN ADULT (H): ICD-10-CM

## 2024-12-18 DIAGNOSIS — E66.813 CLASS 3 SEVERE OBESITY DUE TO EXCESS CALORIES WITH SERIOUS COMORBIDITY AND BODY MASS INDEX (BMI) OF 50.0 TO 59.9 IN ADULT (H): ICD-10-CM

## 2024-12-18 DIAGNOSIS — F25.9 SCHIZOAFFECTIVE DISORDER, UNSPECIFIED TYPE (H): ICD-10-CM

## 2024-12-18 DIAGNOSIS — Z85.850 HISTORY OF THYROID CANCER: ICD-10-CM

## 2024-12-18 DIAGNOSIS — F43.10 PTSD (POST-TRAUMATIC STRESS DISORDER): ICD-10-CM

## 2024-12-18 DIAGNOSIS — F25.1 SCHIZOAFFECTIVE DISORDER, DEPRESSIVE TYPE (H): ICD-10-CM

## 2024-12-18 DIAGNOSIS — G40.909 NONINTRACTABLE EPILEPSY WITHOUT STATUS EPILEPTICUS, UNSPECIFIED EPILEPSY TYPE (H): ICD-10-CM

## 2024-12-18 DIAGNOSIS — G89.29 CHRONIC INTRACTABLE PAIN: Primary | ICD-10-CM

## 2024-12-18 PROCEDURE — G2211 COMPLEX E/M VISIT ADD ON: HCPCS | Performed by: FAMILY MEDICINE

## 2024-12-18 PROCEDURE — 99214 OFFICE O/P EST MOD 30 MIN: CPT | Performed by: FAMILY MEDICINE

## 2024-12-18 RX ORDER — QUETIAPINE FUMARATE 150 MG/1
150 TABLET, FILM COATED ORAL AT BEDTIME
Qty: 30 TABLET | Refills: 1 | Status: SHIPPED | OUTPATIENT
Start: 2024-12-18

## 2024-12-18 RX ORDER — ARIPIPRAZOLE 5 MG/1
15 TABLET ORAL EVERY MORNING
Qty: 30 TABLET | Refills: 2 | Status: SHIPPED | OUTPATIENT
Start: 2024-12-18 | End: 2024-12-18

## 2024-12-18 RX ORDER — ARIPIPRAZOLE 5 MG/1
5 TABLET ORAL EVERY MORNING
Qty: 30 TABLET | Refills: 2 | Status: SHIPPED | OUTPATIENT
Start: 2024-12-18

## 2024-12-18 ASSESSMENT — PATIENT HEALTH QUESTIONNAIRE - PHQ9
SUM OF ALL RESPONSES TO PHQ QUESTIONS 1-9: 18
10. IF YOU CHECKED OFF ANY PROBLEMS, HOW DIFFICULT HAVE THESE PROBLEMS MADE IT FOR YOU TO DO YOUR WORK, TAKE CARE OF THINGS AT HOME, OR GET ALONG WITH OTHER PEOPLE: EXTREMELY DIFFICULT
SUM OF ALL RESPONSES TO PHQ QUESTIONS 1-9: 18

## 2024-12-18 NOTE — PROGRESS NOTES
Assessment & Plan   Problem List Items Addressed This Visit       Chronic intractable pain - Primary    Class 3 severe obesity due to excess calories with serious comorbidity and body mass index (BMI) of 50.0 to 59.9 in adult (H)    History of thyroid cancer     This history reviewed in the context of recent fracture and pain.  Her history is not entirely complete but the most recent pathology showed follicular variant papillary thyroid carcinoma.  The combine surgeries from 94 in 2011 resulted in her having minimal thyroid overall.  I think it is acceptable to consider GLP-1 receptor agonist in light of this pathology.  Will address at next visit.  Ideally Zepbound but her insurance may insist upon Wegovy.  We will also plan to discuss time course.  This may be temporary but given her recent knee fracture, weight loss however achieved is imperative.  This would not interact with her mental health medicines other than to affect absorption.         Nonintractable epilepsy without status epilepticus (H)    PTSD (post-traumatic stress disorder)    Relevant Medications    QUEtiapine 150 MG TABS    Schizoaffective disorder, depressive type (H)     Mental health is a barrier to weight loss.  She is on medicines that cause weight gain.  She is struggling.  She relates that generally she does struggle around the holidays.  Passive suicidal ideation.  Normally she would have leaned on her psychiatrist.  However her psychiatrist is no longer in the system.  She is coming in describing symptoms of crisis.  She lives with her .  She is safe.  - We reviewed extensive records as part of today's visit.  Seroquel was a medicine that has been documented as being helpful and she was on it from 2020 until 2023 at a dose its much higher than her current dose.  She was switched from Seroquel to Abilify in part to mitigate weight gain properties of Seroquel.  We will restore her previous dose.  150 mg nightly.  Reduce Abilify to  "5 mg.  - She has a psychiatrist appointment at a community clinic on 12/31.  We will try to track down her previous psychiatrist MD.  I believe Dr. Puente practicing somewhere in the community.  - Close follow-up.  Recheck scheduled for next week.          Other Visit Diagnoses       Schizoaffective disorder, unspecified type (H)        Relevant Medications    ARIPiprazole (ABILIFY) 5 MG tablet            BMI  Estimated body mass index is 56.23 kg/m  as calculated from the following:    Height as of 11/11/24: 1.676 m (5' 6\").    Weight as of this encounter: 158 kg (348 lb 6.4 oz).   Weight management plan: Discussed healthy diet and exercise guidelines    Depression Screening Follow Up        12/18/2024     8:37 AM   PHQ   PHQ-9 Total Score 18    Q9: Thoughts of better off dead/self-harm past 2 weeks More than half the days    F/U: Thoughts of suicide or self-harm No    F/U: Safety concerns No        Patient-reported         10/9/2024     2:46 PM 11/11/2024     1:53 PM 12/18/2024     8:37 AM   PHQ   PHQ-9 Total Score 16 18 18    Q9: Thoughts of better off dead/self-harm past 2 weeks More than half the days  Several days More than half the days    F/U: Thoughts of suicide or self-harm No   No    F/U: Safety concerns Yes   No        Patient-reported       Follow Up Actions Taken  Crisis resource information provided in the After Visit Summary  Mental Health Referral placed    Discussed the following ways the patient can remain in a safe environment:  be around others    The longitudinal plan of care for the diagnosis(es)/condition(s) as documented were addressed during this visit. Due to the added complexity in care, I will continue to support Valentina in the subsequent management and with ongoing continuity of care.    35 minutes spent by me on the date of the encounter doing chart review, history and exam, documentation and further activities per the note      Subjective   Valentina is a 64 year old, presenting for the " following health issues:  Mental Health Problem (Depression/tremors)        12/18/2024     8:45 AM   Additional Questions   Roomed by Al   Accompanied by , Jass     Depression symptoms: worse over the past month.  Pain in general. Knee pain worse right now.     History of Present Illness       Reason for visit:  Depression and medication   She is taking medications regularly.    Tremor.  Akathsia vs TD.  Left arm and left leg.  Arm and head shakes        Objective    /80 (BP Location: Left arm, Patient Position: Sitting, Cuff Size: Adult Large)   Pulse 95   Temp 97.8  F (36.6  C) (Oral)   Resp 18   Wt (!) 158 kg (348 lb 6.4 oz)   LMP  (LMP Unknown)   SpO2 95%   BMI 56.23 kg/m    Body mass index is 56.23 kg/m .  Physical Exam  Nursing note reviewed.   Constitutional:       General: She is not in acute distress.     Appearance: Normal appearance. She is obese. She is not ill-appearing.      Comments: Wheelchair.   HENT:      Head: Normocephalic and atraumatic.   Eyes:      Extraocular Movements: Extraocular movements intact.      Conjunctiva/sclera: Conjunctivae normal.   Pulmonary:      Effort: Pulmonary effort is normal.   Neurological:      Mental Status: She is alert and oriented to person, place, and time.   Psychiatric:         Attention and Perception: Attention normal.         Mood and Affect: Mood normal.         Speech: Speech normal.         Thought Content: Thought content normal.                    Signed Electronically by: Jose Maria Morin MD

## 2024-12-18 NOTE — ASSESSMENT & PLAN NOTE
Mental health is a barrier to weight loss.  She is on medicines that cause weight gain.  She is struggling.  She relates that generally she does struggle around the holidays.  Passive suicidal ideation.  Normally she would have leaned on her psychiatrist.  However her psychiatrist is no longer in the system.  She is coming in describing symptoms of crisis.  She lives with her .  She is safe.  - We reviewed extensive records as part of today's visit.  Seroquel was a medicine that has been documented as being helpful and she was on it from 2020 until 2023 at a dose its much higher than her current dose.  She was switched from Seroquel to Abilify in part to mitigate weight gain properties of Seroquel.  We will restore her previous dose.  150 mg nightly.  Reduce Abilify to 5 mg.  - She has a psychiatrist appointment at a community clinic on 12/31.  We will try to track down her previous psychiatrist MD.  I believe Dr. Puente practicing somewhere in the community.  - Close follow-up.  Recheck scheduled for next week.

## 2024-12-18 NOTE — ASSESSMENT & PLAN NOTE
This history reviewed in the context of recent fracture and pain.  Her history is not entirely complete but the most recent pathology showed follicular variant papillary thyroid carcinoma.  The combine surgeries from 94 in 2011 resulted in her having minimal thyroid overall.  I think it is acceptable to consider GLP-1 receptor agonist in light of this pathology.  Will address at next visit.  Ideally Zepbound but her insurance may insist upon Wegovy.  We will also plan to discuss time course.  This may be temporary but given her recent knee fracture, weight loss however achieved is imperative.  This would not interact with her mental health medicines other than to affect absorption.

## 2024-12-18 NOTE — TELEPHONE ENCOUNTER
Call to patient's home care agency/nurse to update on med changes, detailed voicemail left at secure line for RAJEEV Kirby Case Manager with [facility]. Will fax updated med list to home care agency as well.     Encouraged call back with questions.

## 2024-12-18 NOTE — TELEPHONE ENCOUNTER
Incoming call from Shani, pharmacist with [facility] requesting clarification on Abilify as they received 2 different prescriptions with different doses.  RN spoke with Dr. Morin in person and confirmed dose was decreased to 5 mg daily and a new prescription was already sent in with this dose change.  Pharmacist verbalized understanding and had no further questions/concerns.      Paula Rollins RN  [facility]

## 2024-12-19 ENCOUNTER — MEDICAL CORRESPONDENCE (OUTPATIENT)
Dept: HEALTH INFORMATION MANAGEMENT | Facility: CLINIC | Age: 64
End: 2024-12-19
Payer: COMMERCIAL

## 2024-12-19 ENCOUNTER — TELEPHONE (OUTPATIENT)
Dept: FAMILY MEDICINE | Facility: CLINIC | Age: 64
End: 2024-12-19
Payer: COMMERCIAL

## 2024-12-19 NOTE — TELEPHONE ENCOUNTER
"  Medication Question or Refill    Contacts       Contact Date/Time Type Contact Phone/Fax    12/19/2024 12:05 PM CST Phone (Incoming) Katelyn SABILLON with [facility] [phone]            What medication are you calling about (include dose and sig)?: Home care LPN requesting clarification on Abilify and Seroquel    [place] Pharm[place]:   [facility] [place] [address], [address]  [address], [address]  [place] [address]  Phone: [phone] Fax: [phone]    [facility] [address] [place] [address] [address]  [address] [address]  Phone: [phone] Fax: [phone]      Controlled Substance Agreement on file:   CSA -- Patient Level:    CSA: None found at the patient level.       Who prescribed the medication?: Dr Hubbard    Update as noted:  \"We reviewed extensive records as part of today's visit. Seroquel was a medicine that has been documented as being helpful and she was on it from 2020 until 2023 at a dose its much higher than her current dose. She was switched from Seroquel to Abilify in part to mitigate weight gain properties of Seroquel. We will restore her previous dose. 150 mg nightly. Reduce Abilify to 5 mg. \"      "

## 2024-12-20 ENCOUNTER — MEDICAL CORRESPONDENCE (OUTPATIENT)
Dept: HEALTH INFORMATION MANAGEMENT | Facility: CLINIC | Age: 64
End: 2024-12-20
Payer: COMMERCIAL

## 2024-12-23 ENCOUNTER — ANTICOAGULATION THERAPY VISIT (OUTPATIENT)
Dept: ANTICOAGULATION | Facility: CLINIC | Age: 64
End: 2024-12-23
Payer: COMMERCIAL

## 2024-12-23 ENCOUNTER — TELEPHONE (OUTPATIENT)
Dept: FAMILY MEDICINE | Facility: CLINIC | Age: 64
End: 2024-12-23
Payer: COMMERCIAL

## 2024-12-23 DIAGNOSIS — Z86.711 HISTORY OF PULMONARY EMBOLISM: ICD-10-CM

## 2024-12-23 DIAGNOSIS — I26.99 PULMONARY EMBOLISM WITH INFARCTION (H): Primary | ICD-10-CM

## 2024-12-23 DIAGNOSIS — Z79.01 LONG TERM (CURRENT) USE OF ANTICOAGULANTS: ICD-10-CM

## 2024-12-23 LAB — INR (EXTERNAL): 2.5 (ref 0.9–1.1)

## 2024-12-23 RX ORDER — WARFARIN SODIUM 5 MG/1
TABLET ORAL
Qty: 120 TABLET | Refills: 1 | Status: SHIPPED | OUTPATIENT
Start: 2024-12-23

## 2024-12-23 NOTE — TELEPHONE ENCOUNTER
[facility] calling to request an updated warfarin prescription for patient. Reviewing chart, a new prescription has been sent over to pharmacy today. Reviewed prescription and pharmacy confirms that they received it, they had no further concerns.    Debora Mitchell RN

## 2024-12-23 NOTE — PROGRESS NOTES
ANTICOAGULATION MANAGEMENT     Valentina Olmedo 64 year old female is on warfarin with therapeutic INR result. (Goal INR 2.0-3.0)    Recent labs: (last 7 days)     12/23/24  1350   INR 2.5*       ASSESSMENT     Source(s): Chart Review and Home Care/Facility Nurse     Warfarin doses taken: Warfarin taken as instructed  Diet: No new diet changes identified  Medication/supplement changes: None noted  New illness, injury, or hospitalization: No  Signs or symptoms of bleeding or clotting: No  Previous result: Supratherapeutic  Additional findings: Refill needed today. Valentina meets all criteria for refill (current [facility] referral, visit with referring provider/group in last 15 months unless directed to return in 2 years in last referring provider visit note, lab monitoring up to date or not exceeding 2 weeks overdue). Rx instructions and quantity match patient's current dosing plan. Warfarin 90 day supply with 1 refill granted per [facility] protocol        PLAN     Recommended plan for no diet, medication or health factor changes affecting INR     Dosing Instructions: Continue your current warfarin dose with next INR in 1 week       Summary  As of 12/23/2024      Full warfarin instructions:  5 mg every Tue, Fri; 7.5 mg all other days   Next INR check:  12/30/2024               Telephone call with [facility] home care nurse who verbalizes understanding and agrees to plan    Orders given to  Homecare nurse/facility to recheck    Education provided: Please call back if any changes to your diet, medications or how you've been taking warfarin    Plan made per [facility] anticoagulation protocol    Emerita Ca RN  12/23/2024  Anticoagulation Clinic  [employer] for routing messages: raquel MATHUR  [facility] patient phone line: [phone]        _______________________________________________________________________     Anticoagulation Episode Summary       Current INR goal:  2.0-3.0   TTR:  52.7% (1 y)   Target end date:  Indefinite   Send INR  reminders to:  NETTAAG KEVAN    Indications    History of pulmonary embolism (Resolved) [Z86.711]  Pulmonary embolism with infarction (H) [I26.99]  Long term (current) use of anticoagulants [Z79.01]  History of pulmonary embolism [Z86.711]             Comments:  --             Anticoagulation Care Providers       Provider Role Specialty Phone number    Promise Vanegas MD Referring Family Medicine [phone]    Donald Rooney MD Referring Internal Medicine [phone]    Jose Maria Morin MD Referring Family Medicine [phone]

## 2024-12-27 ENCOUNTER — MEDICAL CORRESPONDENCE (OUTPATIENT)
Dept: HEALTH INFORMATION MANAGEMENT | Facility: CLINIC | Age: 64
End: 2024-12-27

## 2024-12-31 ENCOUNTER — ANTICOAGULATION THERAPY VISIT (OUTPATIENT)
Dept: ANTICOAGULATION | Facility: CLINIC | Age: 64
End: 2024-12-31
Payer: COMMERCIAL

## 2024-12-31 DIAGNOSIS — Z86.711 HISTORY OF PULMONARY EMBOLISM: ICD-10-CM

## 2024-12-31 DIAGNOSIS — Z79.01 LONG TERM (CURRENT) USE OF ANTICOAGULANTS: ICD-10-CM

## 2024-12-31 DIAGNOSIS — I26.99 PULMONARY EMBOLISM WITH INFARCTION (H): Primary | ICD-10-CM

## 2024-12-31 LAB — INR (EXTERNAL): 2.3 (ref 0.9–1.1)

## 2024-12-31 NOTE — PROGRESS NOTES
ANTICOAGULATION MANAGEMENT     Valentina Olmedo 64 year old female is on warfarin with therapeutic INR result. (Goal INR 2.0-3.0)    Recent labs: (last 7 days)     12/31/24  1521   INR 2.3*       ASSESSMENT     Source(s): Chart Review and Home Care/Facility Nurse     Warfarin doses taken: Warfarin taken as instructed  Diet: No new diet changes identified  Medication/supplement changes: None noted  New illness, injury, or hospitalization: No  Signs or symptoms of bleeding or clotting: No  Previous result: Therapeutic last visit; previously outside of goal range  Additional findings:  per OV notes from 12/27/24 patient needs dental extractions.  Reviewed protocol with home care nurse regarding warfarin and contacting the dental clinic.         PLAN     Recommended plan for no diet, medication or health factor changes affecting INR     Dosing Instructions: Continue your current warfarin dose with next INR in 2 weeks       Summary  As of 12/31/2024      Full warfarin instructions:  5 mg every Tue, Fri; 7.5 mg all other days   Next INR check:  1/14/2025               Telephone call with [facility] home care nurse who verbalizes understanding and agrees to plan    Orders given to  Homecare nurse/facility to recheck    Education provided: Please call back if any changes to your diet, medications or how you've been taking warfarin    Plan made per [facility] anticoagulation protocol    Emerita Ca RN  12/31/2024  Anticoagulation Clinic  [employer] for routing messages: raquel MATHUR  [facility] patient phone line: [phone]        _______________________________________________________________________     Anticoagulation Episode Summary       Current INR goal:  2.0-3.0   TTR:  52.8% (1 y)   Target end date:  Indefinite   Send INR reminders to:  PIPO MATHUR    Indications    History of pulmonary embolism (Resolved) [Z86.711]  Pulmonary embolism with infarction (H) [I26.99]  Long term (current) use of anticoagulants  [Z79.01]  History of pulmonary embolism [Z86.711]             Comments:  --             Anticoagulation Care Providers       Provider Role Specialty Phone number    Promise Vanegas MD Referring Family Medicine [phone]    Donald Rooney MD Referring Internal Medicine [phone]    Jose Maria Morin MD Referring Family Medicine [phone]

## 2025-01-06 ENCOUNTER — DOCUMENTATION ONLY (OUTPATIENT)
Dept: PULMONOLOGY | Facility: CLINIC | Age: 65
End: 2025-01-06
Payer: COMMERCIAL

## 2025-01-06 NOTE — PROGRESS NOTES
OV notes from 8/5/24 prior to sleep study faxed to [facility] as requested. Fax:  [phone]

## 2025-01-07 ENCOUNTER — MEDICAL CORRESPONDENCE (OUTPATIENT)
Dept: HEALTH INFORMATION MANAGEMENT | Facility: CLINIC | Age: 65
End: 2025-01-07
Payer: COMMERCIAL

## 2025-01-07 DIAGNOSIS — E66.01 CLASS 3 SEVERE OBESITY DUE TO EXCESS CALORIES WITH SERIOUS COMORBIDITY AND BODY MASS INDEX (BMI) OF 50.0 TO 59.9 IN ADULT (H): ICD-10-CM

## 2025-01-07 DIAGNOSIS — S09.90XS CLOSED HEAD INJURY, SEQUELA: ICD-10-CM

## 2025-01-07 DIAGNOSIS — I26.99 PULMONARY EMBOLISM WITH INFARCTION (H): ICD-10-CM

## 2025-01-07 DIAGNOSIS — E03.9 ACQUIRED HYPOTHYROIDISM: ICD-10-CM

## 2025-01-07 DIAGNOSIS — F17.200 NICOTINE USE DISORDER: ICD-10-CM

## 2025-01-07 DIAGNOSIS — E66.813 CLASS 3 SEVERE OBESITY DUE TO EXCESS CALORIES WITH SERIOUS COMORBIDITY AND BODY MASS INDEX (BMI) OF 50.0 TO 59.9 IN ADULT (H): ICD-10-CM

## 2025-01-07 DIAGNOSIS — G47.33 OBSTRUCTIVE SLEEP APNEA (ADULT) (PEDIATRIC): Primary | ICD-10-CM

## 2025-01-07 DIAGNOSIS — E78.2 MIXED HYPERLIPIDEMIA: ICD-10-CM

## 2025-01-07 DIAGNOSIS — I10 ESSENTIAL HYPERTENSION: ICD-10-CM

## 2025-01-08 DIAGNOSIS — J44.9 CHRONIC OBSTRUCTIVE PULMONARY DISEASE, UNSPECIFIED COPD TYPE (H): ICD-10-CM

## 2025-01-08 NOTE — TELEPHONE ENCOUNTER
Medication Question or Refill        What medication are you calling about (include dose and sig)?: albuterol (PROAIR HFA) 108 (90 Base) MCG/ACT inhaler     Pref[place] Pharmacy:   [facility] [place] [address], [address]  [address], [address]  [place] [address]  Phone: [phone] Fax: [phone]      Controlled Substance Agreement on file:   CSA -- Patient Level:    CSA: None found at the patient level.       Who prescribed the medication?: PCP    Do you need a refill? Yes - [facility] does not have this on file for the patient, please send RX to [facility]    When did you use the medication last? ongoing    Patient offered an appointment? No    Do you have any questions or concerns?  No      Could we send this information to you in [employer] or would you prefer to receive a phone call?:   Patient would prefer a phone call   Okay to leave a detailed message?: Yes at Cell number on file:    Telephone Information:   Mobile [phone]

## 2025-01-09 RX ORDER — ALBUTEROL SULFATE 90 UG/1
2 INHALANT RESPIRATORY (INHALATION) EVERY 4 HOURS PRN
Qty: 18 G | Refills: 5 | Status: SHIPPED | OUTPATIENT
Start: 2025-01-09

## 2025-01-14 ENCOUNTER — ANTICOAGULATION THERAPY VISIT (OUTPATIENT)
Dept: ANTICOAGULATION | Facility: CLINIC | Age: 65
End: 2025-01-14
Payer: COMMERCIAL

## 2025-01-14 DIAGNOSIS — Z86.711 HISTORY OF PULMONARY EMBOLISM: ICD-10-CM

## 2025-01-14 DIAGNOSIS — Z79.01 LONG TERM (CURRENT) USE OF ANTICOAGULANTS: ICD-10-CM

## 2025-01-14 DIAGNOSIS — I26.99 PULMONARY EMBOLISM WITH INFARCTION (H): Primary | ICD-10-CM

## 2025-01-14 LAB — INR (EXTERNAL): 2.8 (ref 0.9–1.1)

## 2025-01-14 NOTE — PROGRESS NOTES
ANTICOAGULATION MANAGEMENT     Valentina Olmedo 64 year old female is on warfarin with therapeutic INR result. (Goal INR 2.0-3.0)    Recent labs: (last 7 days)     01/14/25  1207   INR 2.8*       ASSESSMENT     Source(s): Chart Review and Home Care/Facility Nurse     Warfarin doses taken: Warfarin taken as instructed  Diet: No new diet changes identified  Medication/supplement changes: None noted  New illness, injury, or hospitalization: No  Signs or symptoms of bleeding or clotting: No  Previous result: Therapeutic last 2(+) visits  Additional findings: None       PLAN     Recommended plan for no diet, medication or health factor changes affecting INR     Dosing Instructions: Continue your current warfarin dose with next INR in 3 weeks       Summary  As of 1/14/2025      Full warfarin instructions:  5 mg every Tue, Fri; 7.5 mg all other days   Next INR check:  2/4/2025               Telephone call with [facility] home care nurse who verbalizes understanding and agrees to plan    Patient to recheck with home meter    Education provided: Please call back if any changes to your diet, medications or how you've been taking warfarin  Symptom monitoring: monitoring for bleeding signs and symptoms, monitoring for clotting signs and symptoms, and monitoring for stroke signs and symptoms    Plan made per [facility] anticoagulation protocol    Zari Cheng RN  1/14/2025  Anticoagulation Clinic  [employer] for routing messages: raquel MATHUR  [facility] patient phone line: [phone]        _______________________________________________________________________     Anticoagulation Episode Summary       Current INR goal:  2.0-3.0   TTR:  53.3% (1 y)   Target end date:  Indefinite   Send INR reminders to:  PIPO MATHUR    Indications    History of pulmonary embolism (Resolved) [Z86.711]  Pulmonary embolism with infarction (H) [I26.99]  Long term (current) use of anticoagulants [Z79.01]  History of pulmonary embolism [Z86.711]              Comments:  --             Anticoagulation Care Providers       Provider Role Specialty Phone number    Promise Vanegas MD Referring Family Medicine [phone]    Donald Rooney MD Referring Internal Medicine [phone]    Jose Maria Morin MD Referring Family Medicine [phone]

## 2025-01-15 ENCOUNTER — MEDICAL CORRESPONDENCE (OUTPATIENT)
Dept: HEALTH INFORMATION MANAGEMENT | Facility: CLINIC | Age: 65
End: 2025-01-15
Payer: COMMERCIAL

## 2025-01-15 NOTE — PROGRESS NOTES
ASSESSMENT & PLAN   Today we discussed the underlying etiology/pathology of patient's   1. Primary osteoarthritis of right knee (moderate medial and mild PF joints)    2. closed fracture of proximal end of right tibia (Nondisplaced/incomplete, mildly comminuted fracture of the tibial eminence and proximal metaphysis), with interval healing DOI: 09/28/24    3. medial meniscus tear (radial tear with extrusion) of right knee   4. Morbid obesity (H)  BMI: 57    5. Sprain of medial collateral ligament of right knee, grade 2 based on MRI, with interval improvement    6. Long term (current) use of anticoagulants- warfarin    7. Pulmonary embolism with infarction (H)    8. Tremor- hands,  L>R        Today a shared decision making model was used. The patient's values and choices were respected. The following information represents what was discussed and decided upon by the provider and the patient.  -We discussed today that patient continues to have largely medial sided right knee pain.  At this time her x-rays of her right knee show advancing osteoarthritis largely in the medial compartment of her knee without bone-on-bone contact.  There appears to be increased callus/healing of her previously noted proximal tibia and tibial eminence fracture.  - We discussed the patient was only dispensed her hinged knee brace approximately 2 weeks ago even though this was ordered back in November 2024.  She has only wore it 4 times since it being dispensed due to confusion of how to apply it and fitting it with her .  - We discussed that on her physical exam she has more pain along the medial joint line consistent with underlying arthritis with known radial meniscus tear with extrusion then previously noted MCL injury, proximal tibia fracture  -At this time patient will be referred to one of my partners for ultrasound-guided right knee intra-articular cortisone injection to assist with decreasing pain and improving function  related to her underlying arthritis and medial meniscus tear.  This needs to be done under ultrasound due to the patient's like habitus/BMI.  Patient will be contacted by central scheduling within the next week to find a mutual time to meet for right knee injection.  - Based on her physical exam her MCL appears to be stable without pain with valgus stressing or laxity.  I believe it is appropriate at this time to discontinue hinged knee brace.  - Patient may ambulate/weight-bear as tolerated on her right lower extremity.  We did discuss the patient largely is a nonambulator.  - Patient will be referred to physical therapy for right leg deconditioning/strengthening program as well as to help assist with underlying osteoarthritis rehab program.  - Patient otherwise may follow-up as needed based on response to corticosteroid injection and PT program.  - Patient at this time again is not a surgical candidate as she needs to continue to work on lowering her BMI which was previously discussed.  Weight loss management needs to continue to be discussed with weight loss management clinic and her primary care provider.  - Patient to continue with chronic anticoagulation with warfarin managed by her primary care team due to history of pulmonary embolism.  Currently her INR is therapeutic.  -Patient will continue with Tylenol for pain management as well as low-dose Cymbalta.  - If patient does not get adequate pain control with therapy and corticosteroid injection she would need to be referred to a pain clinic for further care for her knee as she is not a surgical candidate.  Treatment options could be considered for genicular nerve blocks.    - Appointment line phone number is [[phone]]     Tesfaye Cantor PA-C  [facility]    This note was completed in part using a voice recognition software, any grammatical or context distortion are unintentional and inherent to the software.     You have been  referred to physical or occupational therapy.  At any time you can contact the scheduling department [phone] to arrange your own appointments but an [facility]  will call you to coordinate your care as prescribed by your provider within the next 2 business days. If you don't hear from a representative within 3 business days, please call [phone].   Please be aware that coverage of these services is subject to the terms and limitations of your health insurance plan.  Call member services at your health plan with any benefit or coverage questions.                SUBJECTIVE  Valentina Olmedo is a/an 64 year old female who is seen for follow up of right knee pain. The patient is seen with their , Jass.  Since last visit on 11/27/2024 patient has not improved since last visit.  Original Date of Injury: 09/28/2024    Expanded HPI: Patient has a known proximal tibia fracture with tibial eminence involvement, grade 2 MCL injury, medial meniscus tear and underlying osteoarthritis based on MRI scan done in November 2024 at [facility] radiology.  Patient was only dispensed her hinged knee brace 2 weeks ago.  Patient has a difficult time recalling details and relies on her  to help provide information.  She has only wore her hinged knee brace 4 times since it being dispensed 2 weeks ago.  Brace is not being worn due to confusion on how to apply the brace to her leg which is her 's responsibility as patient is unable to do it independently.  Patient continues to voice discomfort more so over the medial aspect of her knee.  Home health physical therapy stopped coming in November 2024 and she has not been doing any therapy otherwise.  She again is a minimal ambulator.  She denies any parrish mechanical symptoms of the right leg or knee but states her leg feels weak.    Has previous treatment plan been beneficial for condition: no  Treatment used since last visit:  brace (not wearing  everyday)  Location of Pain: right knee, medial and anterior  Rating of Pain at worst: 10/10  Rating of Pain Currently: 6/10  Worsened by: weight bearing, any type of movement  Better with: rest and no activity.  Treatments tried: rest/activity avoidance, ice, heat, Tylenol, previous imaging (MRI 11/20/2024), and casting/splinting/bracing  Quality: sharp  Associated symptoms: no distal numbness or tingling; denies swelling or warmth      Past Medical History:   Diagnosis Date    Adrenal mass 10/12/2015    Chen Null MD  They were incidentally discovered on the CAT scan of the abdomen from December 2011 which was done for evaluation of kidney stones. F/up CT of the abdomen done on 6/26/12 showed a stable 5 mm nodular opacity in the right lower lung lobe, adjacent to the diagram. Bilateral adrenal masses average density measured less than 0 Hounsfield units, consistent with benign etio    Age-related cataract of both eyes, unspecified age-related cataract type 03/27/2024    Anxiety     Anxiety     Arthritis     Borderline personality disorder (H)     Cancer (H)     COPD (chronic obstructive pulmonary disease) (H)     Depression     Depressive disorder     History of COVID-19     Hyperlipidemia     Hypertension     Hypertension     Hyponatremia     Hypothyroidism     Malignant neoplasm of thyroid gland (H)     Chen Null MD  She had R hemithyroidectomy for a right neck tumor in 1994. According to the patient, the tumor was benign. She is not sure whether or not the tumor belonged to the thyroid gland. The surgery was done here at the [facility]. In January 2011, she was diagnosed with kidney stones. She was found to have an elevated calcium level of 11.7, with a PTH level of 368. Stone an    Primary hyperparathyroidism           PTSD (post-traumatic stress disorder)     Pulmonary embolism with infarction (H) 01/12/2021    Recurrent otitis media     Seizure disorder (H)     Sleep apnea      cpap at [facility]    Stroke (H)     Tobacco abuse     Vertigo      Social History     Socioeconomic History    Marital status:    Tobacco Use    Smoking status: Former     Types: Cigarettes     Passive exposure: Never    Smokeless tobacco: Never    Tobacco comments:     Quit about 5 days ago per pt   Vaping Use    Vaping status: Never Used   Substance and Sexual Activity    Alcohol use: No     Comment: None.    Drug use: No    Sexual activity: Not Currently     Partners: Male     Social Drivers of Health     Financial Resource Strain: High Risk (1/3/2024)    Financial Resource Strain     Within the past 12 months, have you or your family members you live with been unable to get utilities (heat, electricity) when it was really needed?: Yes   Food Insecurity: Food Insecurity Present (6/15/2024)    Received from [employer]    Hunger Vital Sign     Worried About Running Out of Food in the Last Year: Sometimes true     Ran Out of Food in the Last Year: Sometimes true   Transportation Needs: No Transportation Needs (6/15/2024)    Received from [employer]    PRAPARE - Transportation     In the past 12 months, has lack of transportation kept you from medical appointments or from getting medications?: No     In the past 12 months, has lack of transportation kept you from meetings, work, or from getting things needed for daily living?: No   Interpersonal Safety: Not At Risk (9/30/2024)    Received from [employer]    Humiliation, Afraid, Rape, and Kick questionnaire     Fear of Current or Ex-Partner: No     Emotionally Abused: No     Physically Abused: No     Sexually Abused: No   Housing Stability: High Risk (6/15/2024)    Received from [employer]    Housing Stability Vital Sign     Unable to Pay for Housing in the Last Year: Yes     Unstable Housing in the Last Year: No         Patient's past medical, surgical, social, and family histories were personally reviewed today and no changes are noted.  REVIEW OF  SYSTEMS:  Review of Systems    OBJECTIVE:  Vital signs as noted in EPIC for 1/15/2025    General: healthy, alert and in no distress  HEENT: no scleral icterus or conjunctival erythema  Skin: no suspicious lesions or rash. No jaundice.  CV: no pedal edema  Resp: normal respiratory effort without conversational dyspnea   Psych: normal mood and affect  Neuro: Normal light sensory exam of lower extremity      MSK:  Exam shows a morbidly obese 64-year-old female who presents today in a wheelchair accompanied by her .  Patient has resting tremor of the hands left greater than right.  She is alert and orientated but does have difficult time recalling information and details.  Examination of the right lower extremity with skin exposed shows tattoo on the right lower extremity.  She actively has full knee extension and flexion to 95 degrees.  She is nontender through the entire quadricep muscle and patella.  There is some slight infrapatellar tenderness as well as tenderness over the anterior tibial spine region.  Diffuse tenderness noted along the medial joint line and to a lesser degree the lateral joint line.  Valgus stressing shows no significant opening or pain of the MCL at 0, 30 and 60 degrees.  LCL appears intact without pain.  Patient generates adequate motor tone without pain with quadricep testing.  There is mild pain over the anterior knee with resisted knee flexion.  Circumduction maneuvers are not attainable due to patient's inability to get out of her wheelchair and leg habitus.  No evidence of effusion.  No evidence of warmth or rash.  No significant crepitation with knee flexion or extension.  No pain to the bony prominences over the distal femur, condyles, medial or lateral tibial plateau or fibular head.  Patient is grossly neurovascularly intact throughout the right leg.        RADIOLOGY AND LABORATORY:   Personal Independent visualization of the below images done today:  Three-view x-ray of the  patient's right knee are obtained and reviewed today.  X-rays reviewed with the patient.  Patient does have moderate medial compartment osteoarthritis.  There appears to be increased sclerotic/callus formation noted in the metaphysis level of the tibia which should be indicative of healing from previously noted metaphysis tibial fracture with tibial eminence involvement.  Tibial eminences are intact without displacement.    Patient's conditions were thoroughly discussed during today's visit with total time reviewing patient's previous medical records/history/radiology, face-to-face examination and discussion and plan of care with the patient and documentation being 40 minutes for today's clinical visit    Tesfaye Cantor PA-C  [facility]    This note was completed in part using a voice recognition software, any grammatical or context distortion are unintentional and inherent to the software.

## 2025-01-16 DIAGNOSIS — F17.210 CIGARETTE NICOTINE DEPENDENCE: ICD-10-CM

## 2025-01-16 RX ORDER — VARENICLINE TARTRATE 1 MG/1
1 TABLET, FILM COATED ORAL 2 TIMES DAILY
Qty: 60 TABLET | Refills: 0 | Status: SHIPPED | OUTPATIENT
Start: 2025-01-16

## 2025-01-17 NOTE — TELEPHONE ENCOUNTER
CALLED PT BACK AND TALKED HER THROUGH HOW TO PUT HER MACHINE TOGETHER. HAD HER PUT MASK ON AND TURN ON MACHINE SO SHE COULD FEEL THE AIR PRESSURE. PT STATED SHE WILL START USING THE MAHCINE WHENEVER SHE CAN TO GET USED TO IT AND SLEEP WITH IT ALSO.      abd

## 2025-01-22 ENCOUNTER — OFFICE VISIT (OUTPATIENT)
Dept: ORTHOPEDICS | Facility: CLINIC | Age: 65
End: 2025-01-22
Payer: COMMERCIAL

## 2025-01-22 ENCOUNTER — ANCILLARY PROCEDURE (OUTPATIENT)
Dept: GENERAL RADIOLOGY | Facility: CLINIC | Age: 65
End: 2025-01-22
Attending: PHYSICIAN ASSISTANT
Payer: COMMERCIAL

## 2025-01-22 DIAGNOSIS — S83.411A SPRAIN OF MEDIAL COLLATERAL LIGAMENT OF RIGHT KNEE, INITIAL ENCOUNTER: ICD-10-CM

## 2025-01-22 DIAGNOSIS — S82.191A OTHER CLOSED FRACTURE OF PROXIMAL END OF RIGHT TIBIA, INITIAL ENCOUNTER: ICD-10-CM

## 2025-01-22 DIAGNOSIS — M17.11 PRIMARY OSTEOARTHRITIS OF RIGHT KNEE: ICD-10-CM

## 2025-01-22 DIAGNOSIS — S83.241A ACUTE MEDIAL MENISCUS TEAR OF RIGHT KNEE, INITIAL ENCOUNTER: ICD-10-CM

## 2025-01-22 DIAGNOSIS — Z79.01 LONG TERM (CURRENT) USE OF ANTICOAGULANTS: ICD-10-CM

## 2025-01-22 DIAGNOSIS — M17.11 PRIMARY OSTEOARTHRITIS OF RIGHT KNEE: Primary | ICD-10-CM

## 2025-01-22 DIAGNOSIS — R25.1 TREMOR: ICD-10-CM

## 2025-01-22 DIAGNOSIS — E66.01 MORBID OBESITY (H): ICD-10-CM

## 2025-01-22 DIAGNOSIS — I26.99 PULMONARY EMBOLISM WITH INFARCTION (H): ICD-10-CM

## 2025-01-22 PROCEDURE — 99215 OFFICE O/P EST HI 40 MIN: CPT | Performed by: PHYSICIAN ASSISTANT

## 2025-01-22 PROCEDURE — 73562 X-RAY EXAM OF KNEE 3: CPT | Mod: TC | Performed by: RADIOLOGY

## 2025-01-22 PROCEDURE — 73560 X-RAY EXAM OF KNEE 1 OR 2: CPT | Mod: TC | Performed by: RADIOLOGY

## 2025-01-22 NOTE — LETTER
1/22/2025      Valentina Olmedo  [address]  [facility] [address]      Dear Colleague,    Thank you for referring your patient, Valentina Olmedo, to the [facility]. Please see a copy of my visit note below.    ASSESSMENT & PLAN   Today we discussed the underlying etiology/pathology of patient's   1. Primary osteoarthritis of right knee (moderate medial and mild PF joints)    2. closed fracture of proximal end of right tibia (Nondisplaced/incomplete, mildly comminuted fracture of the tibial eminence and proximal metaphysis), with interval healing DOI: 09/28/24    3. medial meniscus tear (radial tear with extrusion) of right knee   4. Morbid obesity (H)  BMI: 57    5. Sprain of medial collateral ligament of right knee, grade 2 based on MRI, with interval improvement    6. Long term (current) use of anticoagulants- warfarin    7. Pulmonary embolism with infarction (H)    8. Tremor- hands,  L>R        Today a shared decision making model was used. The patient's values and choices were respected. The following information represents what was discussed and decided upon by the provider and the patient.  -We discussed today that patient continues to have largely medial sided right knee pain.  At this time her x-rays of her right knee show advancing osteoarthritis largely in the medial compartment of her knee without bone-on-bone contact.  There appears to be increased callus/healing of her previously noted proximal tibia and tibial eminence fracture.  - We discussed the patient was only dispensed her hinged knee brace approximately 2 weeks ago even though this was ordered back in November 2024.  She has only wore it 4 times since it being dispensed due to confusion of how to apply it and fitting it with her .  - We discussed that on her physical exam she has more pain along the medial joint line consistent with underlying arthritis with known radial meniscus  tear with extrusion then previously noted MCL injury, proximal tibia fracture  -At this time patient will be referred to one of my partners for ultrasound-guided right knee intra-articular cortisone injection to assist with decreasing pain and improving function related to her underlying arthritis and medial meniscus tear.  This needs to be done under ultrasound due to the patient's like habitus/BMI.  Patient will be contacted by central scheduling within the next week to find a mutual time to meet for right knee injection.  - Based on her physical exam her MCL appears to be stable without pain with valgus stressing or laxity.  I believe it is appropriate at this time to discontinue hinged knee brace.  - Patient may ambulate/weight-bear as tolerated on her right lower extremity.  We did discuss the patient largely is a nonambulator.  - Patient will be referred to physical therapy for right leg deconditioning/strengthening program as well as to help assist with underlying osteoarthritis rehab program.  - Patient otherwise may follow-up as needed based on response to corticosteroid injection and PT program.  - Patient at this time again is not a surgical candidate as she needs to continue to work on lowering her BMI which was previously discussed.  Weight loss management needs to continue to be discussed with weight loss management clinic and her primary care provider.  - Patient to continue with chronic anticoagulation with warfarin managed by her primary care team due to history of pulmonary embolism.  Currently her INR is therapeutic.  -Patient will continue with Tylenol for pain management as well as low-dose Cymbalta.  - If patient does not get adequate pain control with therapy and corticosteroid injection she would need to be referred to a pain clinic for further care for her knee as she is not a surgical candidate.  Treatment options could be considered for genicular nerve blocks.    - Appointment line phone  number is [[phone]]     Tesfaye Cantor PA-C  [facility]    This note was completed in part using a voice recognition software, any grammatical or context distortion are unintentional and inherent to the software.     You have been referred to physical or occupational therapy.  At any time you can contact the scheduling department [phone] to arrange your own appointments but an [facility]  will call you to coordinate your care as prescribed by your provider within the next 2 business days. If you don't hear from a representative within 3 business days, please call [phone].   Please be aware that coverage of these services is subject to the terms and limitations of your health insurance plan.  Call member services at your health plan with any benefit or coverage questions.                SUBJECTIVE  Valentina Olmedo is a/an 64 year old female who is seen for follow up of right knee pain. The patient is seen with their , Jass.  Since last visit on 11/27/2024 patient has not improved since last visit.  Original Date of Injury: 09/28/2024    Expanded HPI: Patient has a known proximal tibia fracture with tibial eminence involvement, grade 2 MCL injury, medial meniscus tear and underlying osteoarthritis based on MRI scan done in November 2024 at [facility].  Patient was only dispensed her hinged knee brace 2 weeks ago.  Patient has a difficult time recalling details and relies on her  to help provide information.  She has only wore her hinged knee brace 4 times since it being dispensed 2 weeks ago.  Brace is not being worn due to confusion on how to apply the brace to her leg which is her 's responsibility as patient is unable to do it independently.  Patient continues to voice discomfort more so over the medial aspect of her knee.  Home health physical therapy stopped coming in November 2024 and she has not been doing any therapy  otherwise.  She again is a minimal ambulator.  She denies any parrish mechanical symptoms of the right leg or knee but states her leg feels weak.    Has previous treatment plan been beneficial for condition: no  Treatment used since last visit:  brace (not wearing everyday)  Location of Pain: right knee, medial and anterior  Rating of Pain at worst: 10/10  Rating of Pain Currently: 6/10  Worsened by: weight bearing, any type of movement  Better with: rest and no activity.  Treatments tried: rest/activity avoidance, ice, heat, Tylenol, previous imaging (MRI 11/20/2024), and casting/splinting/bracing  Quality: sharp  Associated symptoms: no distal numbness or tingling; denies swelling or warmth      Past Medical History:   Diagnosis Date     Adrenal mass 10/12/2015    Chen Null MD  They were incidentally discovered on the CAT scan of the abdomen from December 2011 which was done for evaluation of kidney stones. F/up CT of the abdomen done on 6/26/12 showed a stable 5 mm nodular opacity in the right lower lung lobe, adjacent to the diagram. Bilateral adrenal masses average density measured less than 0 Hounsfield units, consistent with benign etio     Age-related cataract of both eyes, unspecified age-related cataract type 03/27/2024     Anxiety      Anxiety      Arthritis      Borderline personality disorder (H)      Cancer (H)      COPD (chronic obstructive pulmonary disease) (H)      Depression      Depressive disorder      History of COVID-19      Hyperlipidemia      Hypertension      Hypertension      Hyponatremia      Hypothyroidism      Malignant neoplasm of thyroid gland (H)     Chen Null MD  She had R hemithyroidectomy for a right neck tumor in 1994. According to the patient, the tumor was benign. She is not sure whether or not the tumor belonged to the thyroid gland. The surgery was done here at the [facility]. In January 2011, she was diagnosed with kidney stones. She was found  to have an elevated calcium level of 11.7, with a PTH level of 368. Stone an     Primary hyperparathyroidism            PTSD (post-traumatic stress disorder)      Pulmonary embolism with infarction (H) 01/12/2021     Recurrent otitis media      Seizure disorder (H)      Sleep apnea     cpap at [facility]     Stroke (H)      Tobacco abuse      Vertigo      Social History     Socioeconomic History     Marital status:    Tobacco Use     Smoking status: Former     Types: Cigarettes     Passive exposure: Never     Smokeless tobacco: Never     Tobacco comments:     Quit about 5 days ago per pt   Vaping Use     Vaping status: Never Used   Substance and Sexual Activity     Alcohol use: No     Comment: None.     Drug use: No     Sexual activity: Not Currently     Partners: Male     Social Drivers of Health     Financial Resource Strain: High Risk (1/3/2024)    Financial Resource Strain      Within the past 12 months, have you or your family members you live with been unable to get utilities (heat, electricity) when it was really needed?: Yes   Food Insecurity: Food Insecurity Present (6/15/2024)    Received from [facility]    Hunger Vital Sign      Worried About Running Out of Food in the Last Year: Sometimes true      Ran Out of Food in the Last Year: Sometimes true   Transportation Needs: No Transportation Needs (6/15/2024)    [facility]    PRAPARE - Transportation      In the past 12 months, has lack of transportation kept you from medical appointments or from getting medications?: No      In the past 12 months, has lack of transportation kept you from meetings, work, or from getting things needed for daily living?: No   Interpersonal Safety: Not At Risk (9/30/2024)    Received from [facility]    Humiliation, Afraid, Rape, and Kick questionnaire      Fear of Current or Ex-Partner: No      Emotionally Abused: No      Physically Abused: No      Sexually Abused: No   Housing Stability: High Risk  (6/15/2024)    Received from [facility] Stability Vital Sign      Unable to Pay for Housing in the Last Year: Yes      Unstable Housing in the Last Year: No         Patient's past medical, surgical, social, and family histories were personally reviewed today and no changes are noted.  REVIEW OF SYSTEMS:  Review of Systems    OBJECTIVE:  Vital signs as noted in EPIC for 1/15/2025    General: healthy, alert and in no distress  HEENT: no scleral icterus or conjunctival erythema  Skin: no suspicious lesions or rash. No jaundice.  CV: no pedal edema  Resp: normal respiratory effort without conversational dyspnea   Psych: normal mood and affect  Neuro: Normal light sensory exam of lower extremity      MSK:  Exam shows a morbidly obese 64-year-old female who presents today in a wheelchair accompanied by her .  Patient has resting tremor of the hands left greater than right.  She is alert and orientated but does have difficult time recalling information and details.  Examination of the right lower extremity with skin exposed shows tattoo on the right lower extremity.  She actively has full knee extension and flexion to 95 degrees.  She is nontender through the entire quadricep muscle and patella.  There is some slight infrapatellar tenderness as well as tenderness over the anterior tibial spine region.  Diffuse tenderness noted along the medial joint line and to a lesser degree the lateral joint line.  Valgus stressing shows no significant opening or pain of the MCL at 0, 30 and 60 degrees.  LCL appears intact without pain.  Patient generates adequate motor tone without pain with quadricep testing.  There is mild pain over the anterior knee with resisted knee flexion.  Circumduction maneuvers are not attainable due to patient's inability to get out of her wheelchair and leg habitus.  No evidence of effusion.  No evidence of warmth or rash.  No significant crepitation with knee flexion or extension.  No  pain to the bony prominences over the distal femur, condyles, medial or lateral tibial plateau or fibular head.  Patient is grossly neurovascularly intact throughout the right leg.        RADIOLOGY AND LABORATORY:   Personal Independent visualization of the below images done today:  Three-view x-ray of the patient's right knee are obtained and reviewed today.  X-rays reviewed with the patient.  Patient does have moderate medial compartment osteoarthritis.  There appears to be increased sclerotic/callus formation noted in the metaphysis level of the tibia which should be indicative of healing from previously noted metaphysis tibial fracture with tibial eminence involvement.  Tibial eminences are intact without displacement.    Patient's conditions were thoroughly discussed during today's visit with total time reviewing patient's previous medical records/history/radiology, face-to-face examination and discussion and plan of care with the patient and documentation being 40 minutes for today's clinical visit    Tesfaye Cantor PA-C  [facility]    This note was completed in part using a voice recognition software, any grammatical or context distortion are unintentional and inherent to the software.       Again, thank you for allowing me to participate in the care of your patient.        Sincerely,        Tesfaye Cantor PA-C    Electronically signed

## 2025-01-23 ENCOUNTER — PATIENT OUTREACH (OUTPATIENT)
Dept: CARE COORDINATION | Facility: CLINIC | Age: 65
End: 2025-01-23
Payer: COMMERCIAL

## 2025-01-26 ENCOUNTER — HEALTH MAINTENANCE LETTER (OUTPATIENT)
Age: 65
End: 2025-01-26

## 2025-01-27 ENCOUNTER — PATIENT OUTREACH (OUTPATIENT)
Dept: CARE COORDINATION | Facility: CLINIC | Age: 65
End: 2025-01-27
Payer: COMMERCIAL

## 2025-01-29 ENCOUNTER — TELEPHONE (OUTPATIENT)
Dept: FAMILY MEDICINE | Facility: CLINIC | Age: 65
End: 2025-01-29

## 2025-01-29 ENCOUNTER — OFFICE VISIT (OUTPATIENT)
Dept: FAMILY MEDICINE | Facility: CLINIC | Age: 65
End: 2025-01-29
Payer: COMMERCIAL

## 2025-01-29 VITALS
RESPIRATION RATE: 12 BRPM | SYSTOLIC BLOOD PRESSURE: 138 MMHG | TEMPERATURE: 98.4 F | BODY MASS INDEX: 47.09 KG/M2 | HEART RATE: 78 BPM | DIASTOLIC BLOOD PRESSURE: 86 MMHG | WEIGHT: 293 LBS | OXYGEN SATURATION: 95 % | HEIGHT: 66 IN

## 2025-01-29 DIAGNOSIS — E03.9 ACQUIRED HYPOTHYROIDISM: ICD-10-CM

## 2025-01-29 DIAGNOSIS — Z86.73 HISTORY OF STROKE: ICD-10-CM

## 2025-01-29 DIAGNOSIS — E66.813 CLASS 3 SEVERE OBESITY DUE TO EXCESS CALORIES WITH SERIOUS COMORBIDITY AND BODY MASS INDEX (BMI) OF 50.0 TO 59.9 IN ADULT (H): ICD-10-CM

## 2025-01-29 DIAGNOSIS — Z13.220 SCREENING FOR LIPID DISORDERS: ICD-10-CM

## 2025-01-29 DIAGNOSIS — Z13.1 SCREENING FOR DIABETES MELLITUS: ICD-10-CM

## 2025-01-29 DIAGNOSIS — E66.01 CLASS 3 SEVERE OBESITY DUE TO EXCESS CALORIES WITH SERIOUS COMORBIDITY AND BODY MASS INDEX (BMI) OF 50.0 TO 59.9 IN ADULT (H): ICD-10-CM

## 2025-01-29 DIAGNOSIS — E78.00 PURE HYPERCHOLESTEROLEMIA: Primary | ICD-10-CM

## 2025-01-29 DIAGNOSIS — E89.0 POSTOPERATIVE HYPOTHYROIDISM: ICD-10-CM

## 2025-01-29 DIAGNOSIS — G47.33 OSA (OBSTRUCTIVE SLEEP APNEA): ICD-10-CM

## 2025-01-29 LAB
EST. AVERAGE GLUCOSE BLD GHB EST-MCNC: 108 MG/DL
HBA1C MFR BLD: 5.4 % (ref 0–5.6)

## 2025-01-29 PROCEDURE — 36415 COLL VENOUS BLD VENIPUNCTURE: CPT | Performed by: FAMILY MEDICINE

## 2025-01-29 PROCEDURE — 80048 BASIC METABOLIC PNL TOTAL CA: CPT | Performed by: FAMILY MEDICINE

## 2025-01-29 PROCEDURE — G2211 COMPLEX E/M VISIT ADD ON: HCPCS | Performed by: FAMILY MEDICINE

## 2025-01-29 PROCEDURE — 99214 OFFICE O/P EST MOD 30 MIN: CPT | Performed by: FAMILY MEDICINE

## 2025-01-29 PROCEDURE — 84460 ALANINE AMINO (ALT) (SGPT): CPT | Performed by: FAMILY MEDICINE

## 2025-01-29 PROCEDURE — 84439 ASSAY OF FREE THYROXINE: CPT | Performed by: FAMILY MEDICINE

## 2025-01-29 PROCEDURE — 83036 HEMOGLOBIN GLYCOSYLATED A1C: CPT | Performed by: FAMILY MEDICINE

## 2025-01-29 PROCEDURE — 84443 ASSAY THYROID STIM HORMONE: CPT | Performed by: FAMILY MEDICINE

## 2025-01-29 PROCEDURE — 80061 LIPID PANEL: CPT | Performed by: FAMILY MEDICINE

## 2025-01-29 ASSESSMENT — PATIENT HEALTH QUESTIONNAIRE - PHQ9
SUM OF ALL RESPONSES TO PHQ QUESTIONS 1-9: 12
10. IF YOU CHECKED OFF ANY PROBLEMS, HOW DIFFICULT HAVE THESE PROBLEMS MADE IT FOR YOU TO DO YOUR WORK, TAKE CARE OF THINGS AT HOME, OR GET ALONG WITH OTHER PEOPLE: NOT DIFFICULT AT ALL
SUM OF ALL RESPONSES TO PHQ QUESTIONS 1-9: 12

## 2025-01-29 NOTE — PROGRESS NOTES
Assessment & Plan   Problem List Items Addressed This Visit       Class 3 severe obesity due to excess calories with serious comorbidity and body mass index (BMI) of 50.0 to 59.9 in adult (H)     64 year old year old female in clinic today to discuss treatment of the following conditions through diet and lifestyle modification and weight loss:  1. Pure hypercholesterolemia    2. Postoperative hypothyroidism    3. Class 3 severe obesity due to excess calories with serious comorbidity and body mass index (BMI) of 50.0 to 59.9 in adult (H)    4. CHRIS (obstructive sleep apnea)    5. History of stroke    6. Screening for lipid disorders    7. Screening for diabetes mellitus    8. Acquired hypothyroidism      The patient's weight loss result since the last visit was mixed based on weight stability.  She continues to try to reduce portions and be active.  Her physical activity is greatly limited by osteoarthritis of the lower extremities and extreme adiposity in her body habitus.  We again discussed the potential benefits of for GLP-1 receptor agonist.  I am comfortable with the risk profile as a relates to thyroid.  In reviewing notes, she did not have a medullary thyroid cancer.  She has no residual thyroid tissue.  This is probably not a concern.  No history of pancreatitis.  Despite her best efforts at losing weight, she has not been able to achieve 1 pound per week.  She is participating in a structured program called comprehensive weight management through  [employer] [facility] [facility].  No personal/family history of medullary thyroid cancer.  No family history of multiple endocrine neoplasm syndrome.  No personal history of pancreatitis.  She has a history of stroke.  For this reason, I am optimistic that her insurance will cover a medicine such as Wegovy.  We submitted for coverage.  Will titrate according to the normal schedule.  I will see her back in approximately 6 weeks to review progress.  She also has attempted to use  her CPAP machine as discussed elsewhere.         Relevant Medications    semaglutide-weight management (WEGOVY) 0.25 MG/0.5ML pen    History of stroke    Relevant Medications    semaglutide-weight management (WEGOVY) 0.25 MG/0.5ML pen    HLD (hyperlipidemia) - Primary    Relevant Orders    ALT (Completed)    Lipid panel reflex to direct LDL Non-fasting (Completed)    Hypothyroidism     For now, we will leave her levothyroxine dose at its current dose.  TSH slightly suppressed but free T4 within normal range.         Relevant Orders    TSH with free T4 reflex (Completed)    T4 free (Completed)    CHRIS (obstructive sleep apnea)     The patient has been struggling with obstructive sleep apnea.  She has had her machine for approximately 2 weeks.  Sounds like it is not fitting well and she is having difficulty breathing.  She was a bit unclear on who she should ask for help.  We reached out to the sleep clinic via [employer].  [employer] is her DME supplier.          Other Visit Diagnoses       Screening for lipid disorders        Relevant Orders    Lipid panel reflex to direct LDL Non-fasting (Completed)    Screening for diabetes mellitus        Relevant Orders    Basic metabolic panel  (Ca, Cl, CO2, Creat, Gluc, K, Na, BUN) (Completed)    Hemoglobin A1c (Completed)           Depression Screening Follow Up        1/29/2025    11:14 AM   PHQ   PHQ-9 Total Score 12    Q9: Thoughts of better off dead/self-harm past 2 weeks Several days   F/U: Thoughts of suicide or self-harm No   F/U: Safety concerns Yes       Patient-reported         1/29/2025    11:14 AM   Last PHQ-9   1.  Little interest or pleasure in doing things 1   2.  Feeling down, depressed, or hopeless 1   3.  Trouble falling or staying asleep, or sleeping too much 1   4.  Feeling tired or having little energy 1   5.  Poor appetite or overeating 1   6.  Feeling bad about yourself 1   7.  Trouble concentrating 3   8.  Moving slowly or restless 2   Q9: Thoughts of  "better off dead/self-harm past 2 weeks 1   PHQ-9 Total Score 12    In the past two weeks have you had thoughts of suicide or self harm? No   Do you have concerns about your personal safety or the safety of others? Yes       Patient-reported     Follow Up Actions Taken  Crisis resource information provided in the After Visit Summary  Referred patient back to mental health provider    Discussed the following ways the patient can remain in a safe environment:  be around others    The longitudinal plan of care for the diagnosis(es)/condition(s) as documented were addressed during this visit. Due to the added complexity in care, I will continue to support Valentina in the subsequent management and with ongoing continuity of care.      Magda Montgomery is a 64 year old, presenting for the following health issues:  Discuss CPAP        1/29/2025    11:25 AM   Additional Questions   Roomed by xl   Accompanied by spouse     History of Present Illness       Reason for visit:  C Pap  Symptom onset:  3-4 weeks ago  Symptoms include:  Problems breathing while wearing C Pap  Symptom intensity:  Mild  Symptom progression:  Staying the same  Had these symptoms before:  Yes  Has tried/received treatment for these symptoms:  Yes  Previous treatment was successful:  No  What makes it worse:  Wearing it  What makes it better:  Unknown   She is taking medications regularly.           Objective    /86 (BP Location: Left arm, Patient Position: Sitting, Cuff Size: Adult Large)   Pulse 78   Temp 98.4  F (36.9  C) (Oral)   Resp 12   Ht 1.676 m (5' 6\")   Wt (!) 157.3 kg (346 lb 12.8 oz)   LMP  (LMP Unknown)   SpO2 95%   BMI 55.97 kg/m    Body mass index is 55.97 kg/m .  Physical Exam  Nursing note reviewed.   Constitutional:       General: She is not in acute distress.     Appearance: Normal appearance. She is obese. She is not ill-appearing.      Comments: The patient is in a wheelchair.  There is a tremor in her left hand.   HENT:    "   Head: Normocephalic and atraumatic.   Eyes:      Extraocular Movements: Extraocular movements intact.      Conjunctiva/sclera: Conjunctivae normal.   Pulmonary:      Effort: Pulmonary effort is normal.   Neurological:      Mental Status: She is alert and oriented to person, place, and time.   Psychiatric:         Attention and Perception: Attention normal.         Speech: Speech normal.      Comments: Affect is flat.  Thinking is concrete.              Signed Electronically by: Jose Maria Morin MD

## 2025-01-29 NOTE — TELEPHONE ENCOUNTER
RAJEEV Dempsey with [facility] calls to report she re-certified patient for two more months for skilled nursing.     Roselyn states insurance requires PCP to be notified. Roselyn's callback number is [phone]. No callback needed.     RAJEEV Barron   [facility]

## 2025-01-29 NOTE — PATIENT INSTRUCTIONS
"[facility]  will call you to coordinate your care as prescribed by your provider. A representative will call you within 2 business days to help you schedule your appointment, or you may contact the  Representative at: [phone]. <--- this is the number to schedule the knee US/injection    100+ grams of protein per day  800 grams of veggies/fruit day   Do not drink your calories    GLP-1 receptor agonist drug class side effects:    Bureaucracy: Cost, possibility of weight regain?    Not generally covered by Medicare (exception for some plans are for individuals with histories of heart disease).  The typical patient will likely regain weight after going off the medicine.  (Most patients who have gone off of the medicine have not gone off the medicine on his/her \"own terms\").  Think of these medicines in the 5 or 10-year time increments.    Delayed gastric emptying:  These are the side effects that most commonly result in individuals going off of these therapies.  Slowing of the digestive tract.  Food will stay in the stomach longer.  Most common side effect is nausea and queasiness which tends to improve.    Some individuals experience random throwing up.  In my opinion, the medications not worth it if this is occurring.  Constipation.      Pregnancy concerns:  GLP-1 receptor agonists are stopped when a person is pregnant.  ACOG recommends to not be on these medications while trying to conceive.  They have not been shown to cause birth defects.  Theoretically these medications can result any decreased absorption of oral contraceptives.  There is a theoretical concern that OCPs may not be as effective for contraception, in particular after initiation of these medications and after dose adjustments.    Effect on mood:  I have had a couple of patients experience dramatic changes in level of anxiety and depression.  There are case reports in the literature.  Some individuals " use food as a coping mechanism.  If this coping mechanism is no longer possible given the effect of the medicine, some individuals struggle.    Physical Fragility:  Weight loss due to loss of muscle vs burning of fat.  I am concerned that individuals who take these medications long-term are at risk for excessive loss of muscle (medical term: sarcopenia).  Low muscle mass in older individuals leads to injury and decreased overall function.   Strategies to mitigate loss of muscle include dietary practices focused on protein and focusing exercise activities on strength training with functional fitness goals.     Contraindications:  Personal or family history of medullary thyroid cancer, multiple endocrine neoplasm syndrome.  There are some reports that lifetime risk of thyroid cancer increases 2-3x.  This is a rare cancer to begin with.  Personal history of pancreatitis.    GLP1-ra options:    Wegovy (or Ozempic) aka semaglutide: cost per month retail ~$1400

## 2025-01-29 NOTE — TELEPHONE ENCOUNTER
Prior Authorization Request  Who s requesting:  Pharmacy  Pharmacy Name and Location: [facility]  Medication Name: semaglutide-weight management (WEGOVY) 0.25 MG/0.5ML pen   Insurance Plan: [facility]  Insurance Member ID Number:  [id number]   CoverMyMeds Key:   Informed patient that prior authorizations can take up to 10 business days for response:   NA  Okay to leave a detailed message: N/A

## 2025-01-30 LAB
ALT SERPL W P-5'-P-CCNC: 23 U/L (ref 0–50)
ANION GAP SERPL CALCULATED.3IONS-SCNC: 7 MMOL/L (ref 7–15)
BUN SERPL-MCNC: 10.8 MG/DL (ref 8–23)
CALCIUM SERPL-MCNC: 8.3 MG/DL (ref 8.8–10.4)
CHLORIDE SERPL-SCNC: 108 MMOL/L (ref 98–107)
CHOLEST SERPL-MCNC: 216 MG/DL
CREAT SERPL-MCNC: 0.8 MG/DL (ref 0.51–0.95)
EGFRCR SERPLBLD CKD-EPI 2021: 82 ML/MIN/1.73M2
FASTING STATUS PATIENT QL REPORTED: YES
FASTING STATUS PATIENT QL REPORTED: YES
GLUCOSE SERPL-MCNC: 106 MG/DL (ref 70–99)
HCO3 SERPL-SCNC: 26 MMOL/L (ref 22–29)
HDLC SERPL-MCNC: 100 MG/DL
LDLC SERPL CALC-MCNC: 96 MG/DL
NONHDLC SERPL-MCNC: 116 MG/DL
POTASSIUM SERPL-SCNC: 4.2 MMOL/L (ref 3.4–5.3)
SODIUM SERPL-SCNC: 141 MMOL/L (ref 135–145)
T4 FREE SERPL-MCNC: 1.45 NG/DL (ref 0.9–1.7)
TRIGL SERPL-MCNC: 101 MG/DL
TSH SERPL DL<=0.005 MIU/L-ACNC: 0.07 UIU/ML (ref 0.3–4.2)

## 2025-01-30 NOTE — ASSESSMENT & PLAN NOTE
The patient has been struggling with obstructive sleep apnea.  She has had her machine for approximately 2 weeks.  Sounds like it is not fitting well and she is having difficulty breathing.  She was a bit unclear on who she should ask for help.  We reached out to the sleep clinic via [employer].  [employer] is her DME supplier.

## 2025-01-30 NOTE — ASSESSMENT & PLAN NOTE
For now, we will leave her levothyroxine dose at its current dose.  TSH slightly suppressed but free T4 within normal range.

## 2025-01-30 NOTE — ASSESSMENT & PLAN NOTE
64 year old year old female in clinic today to discuss treatment of the following conditions through diet and lifestyle modification and weight loss:  1. Pure hypercholesterolemia    2. Postoperative hypothyroidism    3. Class 3 severe obesity due to excess calories with serious comorbidity and body mass index (BMI) of 50.0 to 59.9 in adult (H)    4. CHRIS (obstructive sleep apnea)    5. History of stroke    6. Screening for lipid disorders    7. Screening for diabetes mellitus    8. Acquired hypothyroidism      The patient's weight loss result since the last visit was mixed based on weight stability.  She continues to try to reduce portions and be active.  Her physical activity is greatly limited by osteoarthritis of the lower extremities and extreme adiposity in her body habitus.  We again discussed the potential benefits of for GLP-1 receptor agonist.  I am comfortable with the risk profile as a relates to thyroid.  In reviewing notes, she did not have a medullary thyroid cancer.  She has no residual thyroid tissue.  This is probably not a concern.  No history of pancreatitis.  Despite her best efforts at losing weight, she has not been able to achieve 1 pound per week.  She is participating in a structured program called comprehensive weight management through [facility].  No personal/family history of medullary thyroid cancer.  No family history of multiple endocrine neoplasm syndrome.  No personal history of pancreatitis.  She has a history of stroke.  For this reason, I am optimistic that her insurance will cover a medicine such as Wegovy.  We submitted for coverage.  Will titrate according to the normal schedule.  I will see her back in approximately 6 weeks to review progress.  She also has attempted to use her CPAP machine as discussed elsewhere.

## 2025-02-03 ENCOUNTER — DOCUMENTATION ONLY (OUTPATIENT)
Dept: SLEEP MEDICINE | Facility: CLINIC | Age: 65
End: 2025-02-03
Payer: COMMERCIAL

## 2025-02-03 ENCOUNTER — MEDICAL CORRESPONDENCE (OUTPATIENT)
Dept: HEALTH INFORMATION MANAGEMENT | Facility: CLINIC | Age: 65
End: 2025-02-03

## 2025-02-03 NOTE — PROGRESS NOTES
STM Recheck:  Spoke with patient she is having problems with the CPAP pressures. She will call [facility] where she received her CPAP from.

## 2025-02-03 NOTE — TELEPHONE ENCOUNTER
PA Initiation    Medication: WEGOVY 0.25 MG/0.5ML SC SOAJ  Insurance Company: Mark ([facility]) - Phone [phone] Fax [phone]  Pharmacy Filling the Rx:    Filling Pharmacy Phone: [phone]  Filling Pharmacy Fax:    Start Date: 2/3/2025

## 2025-02-04 ENCOUNTER — ANTICOAGULATION THERAPY VISIT (OUTPATIENT)
Dept: ANTICOAGULATION | Facility: CLINIC | Age: 65
End: 2025-02-04
Payer: COMMERCIAL

## 2025-02-04 DIAGNOSIS — I26.99 PULMONARY EMBOLISM WITH INFARCTION (H): Primary | ICD-10-CM

## 2025-02-04 DIAGNOSIS — Z79.01 LONG TERM (CURRENT) USE OF ANTICOAGULANTS: ICD-10-CM

## 2025-02-04 DIAGNOSIS — Z86.711 HISTORY OF PULMONARY EMBOLISM: ICD-10-CM

## 2025-02-04 LAB — INR (EXTERNAL): 1.9 (ref 0.9–1.1)

## 2025-02-04 NOTE — PROGRESS NOTES
ANTICOAGULATION MANAGEMENT     Valentina Olmedo 64 year old female is on warfarin with subtherapeutic INR result. (Goal INR 2.0-3.0)    Recent labs: (last 7 days)     02/04/25  1424   INR 1.9*       ASSESSMENT     Source(s): Chart Review, Patient/Caregiver Call, and Home Care/Facility Nurse     Warfarin doses taken: Warfarin taken as instructed  Diet: No new diet changes identified  Medication/supplement changes:  Wegovy has not started yet, waiting to arrive in mail can increase or decrease the INR  New illness, injury, or hospitalization: No  Signs or symptoms of bleeding or clotting: No  Previous result: Therapeutic last 2(+) visits  Additional findings: None       PLAN     Recommended plan for no diet, medication or health factor changes affecting INR     Dosing Instructions: Continue your current warfarin dose with next INR in 1 week       Summary  As of 2/4/2025      Full warfarin instructions:  5 mg every Tue, Fri; 7.5 mg all other days   Next INR check:  2/11/2025               Telephone call with [facility] care nurse who verbalizes understanding and agrees to plan    Orders given to  Homecare nurse/facility to recheck    Education provided: Please call back if any changes to your diet, medications or how you've been taking warfarin    Plan made per [facility] anticoagulation protocol    Emerita Ca RN  2/4/2025  Anticoagulation Clinic  Zoom[employer] for routing messages: raquel MATHUR  [facility] patient phone line: [phone]        _______________________________________________________________________     Anticoagulation Episode Summary       Current INR goal:  2.0-3.0   TTR:  52.7% (1 y)   Target end date:  Indefinite   Send INR reminders to:  PIPO MATHUR    Indications    History of pulmonary embolism (Resolved) [Z86.711]  Pulmonary embolism with infarction (H) [I26.99]  Long term (current) use of anticoagulants [Z79.01]  History of pulmonary embolism [Z86.711]             Comments:  --              Anticoagulation Care Providers       Provider Role Specialty Phone number    Promise Vanegas MD Referring Family Medicine [phone]    Donald Rooney MD Referring Internal Medicine [phone]    Jose Maria Morin MD Referring Family Medicine [phone]

## 2025-02-05 ENCOUNTER — PATIENT OUTREACH (OUTPATIENT)
Dept: CARE COORDINATION | Facility: CLINIC | Age: 65
End: 2025-02-05
Payer: COMMERCIAL

## 2025-02-05 ENCOUNTER — VIRTUAL VISIT (OUTPATIENT)
Dept: ONCOLOGY | Facility: CLINIC | Age: 65
End: 2025-02-05
Attending: PHYSICIAN ASSISTANT
Payer: COMMERCIAL

## 2025-02-05 DIAGNOSIS — Z86.0100 HISTORY OF COLONIC POLYPS: ICD-10-CM

## 2025-02-05 PROCEDURE — 96041 GENETIC COUNSELING SVC EA 30: CPT | Mod: GT,95 | Performed by: GENETIC COUNSELOR, MS

## 2025-02-05 NOTE — LETTER
2/5/2025      Valentina Olmedo  [address]  [place] [address]      Dear Colleague,    Thank you for referring your patient, Valentina Olmedo, to the [facility]. Please see a copy of my visit note below.    Virtual Visit Details    Type of service:  Video Visit   Time spent video visit: 23 minute    Originating Location (pt. Location): Home  Distant Location (provider location):  Off-site  Platform used for Video Visit: Capton    2/5/2025    Referring Provider: Corinne Lebron PA-C    Presenting Information:   I met with Valentina Olmedo today for genetic counseling as part of the Cancer Risk Management Program (virtual visit) to discuss her personal history of colon polyps.  She is here today to review this history, cancer screening recommendations, and available genetic testing options.    Personal History:  Valentina is a 64 year old female. Her most recent colonoscopy in 2024 identified 14 adenomatous polyps, including several tubulovillous adenomas.  She reports one prior colonoscopy (records not available), in which polyps were identified.      Family History: (Please see scanned pedigree for detailed family history information)  One sister has a history of melanoma skin cancer  Her maternal grandmother passed away when 99, and had a history of unknown number of colon polyps  Maternal grandfather had a history of lung cancer and smoking  No paternal family history information available/unknown  Her reported ethnicity is English, Burmese, [place].      Discussion:  The features of sporadic, familial, and hereditary cancers were discussed, as it pertains to Valentina's history of cancer. Valentina's personal history of at least 14 adenomatous polyps could be suggestive of a possible hereditary cancer syndrome.  Of note, no paternal family history information is available.  This may limit risk assessment.    A detailed handout regarding hereditary cancer and the information we discussed can be  found in the after visit summary. Topics included: inheritance pattern, cancer risks, cancer screening recommendations, as well as risks, benefits and limitations of testing.  Valentina meets current National Comprehensive Cancer Network (NCCN) criteria to consider genetic testing of adenomatous polyp genes (including APC, MUTYH.  Specifically, these guidelines support consideration of germline genetic testing for individuals with more than 10 adenomatous polyps.  No paternal family history information is available.    We discussed that there are additional genes that could cause increased risk for the cancers in Valentina's family. As many of these genes present with overlapping features in a family and accurate cancer risk cannot always be established based upon the pedigree analysis alone, it would be reasonable for Valentina to consider panel genetic testing to analyze multiple genes at once.  The information from genetic testing may determine additional cancer screening and risk management options, including earlier/more frequent imaging, and possible risk reducing surgeries.  For individuals with an active cancer diagnosis, the results from genetic testing may guide targeted therapies (i.e. immunotherapy for individuals with Woodson syndrome, PARP inhibitors, etc.). These recommendations will be discussed in detail once genetic testing is completed.   An individual's personal and/or family history of cancer may be explained by more than one inherited cancer syndrome.  We reviewed available genetic testing options to address this history, including a tailored disease-focused panel, and expanded multi-cancer panels. Discussion included risks, benefits and limitations of testing.  Valentina elected to proceed with genetic testing today.    The purpose of this genetic testing is to determine if Valentina carries an inherited variant(s) associated with increased risk to develop cancer.  Genes included on this test may be associated with multiple  types of cancer and are also associated with varying levels of cancer risk.  Depending on these cancer risks, specific screening and medical management recommendations may be available.  Possible results from testing typically include  positive  (pathogenic/likely pathogenic variant),  negative  (normal), or  variant of unknown significance  (VUS).  Genetic test results have implications for Valentina's family. Should a variant be identified, close relatives may also have a risk to carry the genetic variant. Some of these genes may have additional reproductive risks and options for further testing.    There are federal laws in place that prohibit health insurers and employers from discriminating based on genetic information (for example, the Genetic Information Nondiscrimination Act (JAMIE) of 2008; some exceptions to apply.   This protection does not cover life insurance, long term care, or disability insurances.   The informed consent process has occurred, as I have provided the patient with an explanation of genetic testing and risk management options. I discussed the risks, benefits and alternatives to testing. The patient was given the opportunity to ask questions, and their questions were answered. The patient has provided consent to germline genetic testing, and elected to proceed with the hereditary colorectal cancer panel (through [facility]).       Genetic Testing: order placed for APC/polyposis testing with automatic reflex to the colorectal cancer panel.     Plan:  1) Valentina elected to proceed with germline genetic testing, including APC/polyposis testing with automatic reflex to the colorectal cancer panel..  2) A blood draw will be scheduled at an [facility] clinic   3) Valentina will be contacted once testing is completed to schedule a return genetic counseling visit, in which the results from testing and medical management recommendations will be discussed.  Test turn around time:  approximately 4-6 weeks.        Sparkle Piper MS, [facility] [place]  Licensed, Certified Genetic Counselor      I spent 40 minutes on the date of the encounter doing chart review, history and exam, documentation and further activities as noted above.      Again, thank you for allowing me to participate in the care of your patient.        Sincerely,        Sparkle Piper GC    Electronically signed

## 2025-02-05 NOTE — PATIENT INSTRUCTIONS
Assessing Cancer Risk  Cancer is a common diagnosis which impacts many families.  Individuals may develop cancer due to environmental factors (such as exposures and lifestyle), aging, genetic predisposition, or a combination of these factors.  The vast majority of cancer diagnoses are considered sporadic, and not primarily due to an inherited factor. Approximately 5-10% of cancer diagnoses are thought to be caused by inherited risk factors.       Many of the genes we are born with impact our risk of certain diseases, such as cancer.  When one of these genes is not working properly due to a mistake (known as a  mutation  or  variant ), this may lead to an increased risk of developing cancer.      Families impacted by a hereditary cancer syndrome are more likely to have relatives across several generations diagnosed with cancer, earlier ages of diagnoses (prior to age 50), certain rare tumors, or relatives diagnosed with multiple primary cancers.  However, this may not be the case for all families which carry a cancer risk gene, such as those with small family size or limited history information.         Genetic Testing  For some families, genetic testing may help to explain why their cancer developed, provide tailored management options, and clarify the risk of developing cancer in the future.      Genetic testing involves a simple blood or saliva test and will look at the genetic information in select genes for variants associated with cancer risk.  This testing may include analysis of a single gene due to a known variant in the family, multiple genes most associated with the cancers in a family, or an expanded panel of genes related to many types of cancers.    Results  There are several possible genetic test results, including:   Positive--a harmful mutation (also known as a  pathogenic  or  likely pathogenic  variant) was identified in a gene associated with increased cancer risk.  These risks, as well as  medical management options, depend on the specific genetic variant identified.    Negative--no variants were identified in the genes analyzed   Variant of unknown significance--a variant was identified in one or more genes, though it is currently unclear how this impacts cancer risk in the family.  Genetic testing labs are working to collect evidence about these uncertain variants and may provide updates in the future.    Medical Management  If a harmful mutation is found in a cancer risk gene, there may be increased cancer surveillance or preventative surgeries that can be offered. This information will be discussed after genetic testing is completed. If no mutations are found on genetic testing, screening is often recommended based on personal and/or family history of cancer. All cancer risk management options should be discussed in more detail with an individual's medical providers.    Inheritance   Variants in most cancer risk genes are inherited in an autosomal dominant pattern.  This means that if a parent has a variant, each of their children will have a 50% chance of inheriting that same variant.  Therefore, each child would have a 50% chance of being at increased risk for developing cancer.    Some cancer risk genes are inherited in an autosomal recessive pattern.  These risks are present when an individual inherits mutations from both parents in the same gene.         Genetic Information Nondiscrimination Act (JAMIE)  The Genetic Information Nondiscrimination Act of 2008 (JAMIE) is a federal law that protects individuals from health insurance or employment discrimination based on a genetic test result alone (with some exceptions, including employers with fewer than 15 employees, and ).  However, JAMIE's protection does not cover life insurance, long term care, or disability insurances.  The [facility] [place] is a great resource to learn more.    Questions to Think About  Regarding Genetic Testing  What effect will the test result have on me and my relationship with my family members if I have an inherited gene mutation?  If I don't have a gene mutation?  Should I share my test results, and how will my family react to this news, which may also affect them?  Are my children ready to learn new information that may one day affect their own health?      TURNAROUND TIME  4 - 6 weeks    INSURANCE COVERAGE   A prior authorization will be submitted when your provider submits the order for this panel. Testing will be held until prior authorization is obtained. You will be contacted once prior authorization is completed. For details regarding coverage and out-of-pocket estimates, please contact a  at the [facility]) at [phone].    About the [facility]), [facility]  In collaborative effort with the [facility] and the [place] [employer], the [facility] offers a full range of diagnostic testing services, with a strong focus on quality, accuracy, and timeliness.    Please call us if you have any questions or concerns   (Appointments: [phone])    Ivan Guthrie, MS [facility] [place]  [phone]  Norma Eubanks, MS, [facility] [place] [phone]  Hellen Ruiz, MS, [facility] [place]  [phone]  Sparkle Piper, MS, [facility] [place]  [phone]  Supriya Hankins, MS, [facility] [place]  [phone]  Neyda Scott, MS, [facility] [place]  [phone]  China Zamora, MS, [facility] [place]  [phone]

## 2025-02-05 NOTE — PROGRESS NOTES
Virtual Visit Details    Type of service:  Video Visit   Time spent video visit: 23 minute    Originating Location (pt. Location): Home  Distant Location (provider location):  Off-site  Platform used for Video Visit: AmWell    2/5/2025    Referring Provider: Corinne Lebron PA-C    Presenting Information:   I met with Valentina Olmedo today for genetic counseling as part of the Cancer Risk Management Program (virtual visit) to discuss her personal history of colon polyps.  She is here today to review this history, cancer screening recommendations, and available genetic testing options.    Personal History:  Valentina is a 64 year old female. Her most recent colonoscopy in 2024 identified 14 adenomatous polyps, including several tubulovillous adenomas.  She reports one prior colonoscopy (records not available), in which polyps were identified.      Family History: (Please see scanned pedigree for detailed family history information)  One sister has a history of melanoma skin cancer  Her maternal grandmother passed away when 99, and had a history of unknown number of colon polyps  Maternal grandfather had a history of lung cancer and smoking  No paternal family history information available/unknown  Her reported ethnicity is English, Hebrew, Cam[place].      Discussion:  The features of sporadic, familial, and hereditary cancers were discussed, as it pertains to Valentina's history of cancer. Valentina's personal history of at least 14 adenomatous polyps could be suggestive of a possible hereditary cancer syndrome.  Of note, no paternal family history information is available.  This may limit risk assessment.    A detailed handout regarding hereditary cancer and the information we discussed can be found in the after visit summary. Topics included: inheritance pattern, cancer risks, cancer screening recommendations, as well as risks, benefits and limitations of testing.  Valentina meets current National Comprehensive Cancer Network (NCCN) criteria to  consider genetic testing of adenomatous polyp genes (including APC, MUTYH.  Specifically, these guidelines support consideration of germline genetic testing for individuals with more than 10 adenomatous polyps.  No paternal family history information is available.    We discussed that there are additional genes that could cause increased risk for the cancers in Valentina's family. As many of these genes present with overlapping features in a family and accurate cancer risk cannot always be established based upon the pedigree analysis alone, it would be reasonable for Valentina to consider panel genetic testing to analyze multiple genes at once.  The information from genetic testing may determine additional cancer screening and risk management options, including earlier/more frequent imaging, and possible risk reducing surgeries.  For individuals with an active cancer diagnosis, the results from genetic testing may guide targeted therapies (i.e. immunotherapy for individuals with Woodson syndrome, PARP inhibitors, etc.). These recommendations will be discussed in detail once genetic testing is completed.   An individual's personal and/or family history of cancer may be explained by more than one inherited cancer syndrome.  We reviewed available genetic testing options to address this history, including a tailored disease-focused panel, and expanded multi-cancer panels. Discussion included risks, benefits and limitations of testing.  Valentina elected to proceed with genetic testing today.    The purpose of this genetic testing is to determine if Valentina carries an inherited variant(s) associated with increased risk to develop cancer.  Genes included on this test may be associated with multiple types of cancer and are also associated with varying levels of cancer risk.  Depending on these cancer risks, specific screening and medical management recommendations may be available.  Possible results from testing typically include  positive   (pathogenic/likely pathogenic variant),  negative  (normal), or  variant of unknown significance  (VUS).  Genetic test results have implications for Valentina's family. Should a variant be identified, close relatives may also have a risk to carry the genetic variant. Some of these genes may have additional reproductive risks and options for further testing.    There are federal laws in place that prohibit health insurers and employers from discriminating based on genetic information (for example, the Genetic Information Nondiscrimination Act (JAMIE) of 2008; some exceptions to apply.   This protection does not cover life insurance, long term care, or disability insurances.   The informed consent process has occurred, as I have provided the patient with an explanation of genetic testing and risk management options. I discussed the risks, benefits and alternatives to testing. The patient was given the opportunity to ask questions, and their questions were answered. The patient has provided consent to germline genetic testing, and elected to proceed with the hereditary colorectal cancer panel (through the [facility]).       Genetic Testing: order placed for APC/polyposis testing with automatic reflex to the colorectal cancer panel.     Plan:  1) Valentina elected to proceed with germline genetic testing, including APC/polyposis testing with automatic reflex to the colorectal cancer panel..  2) A blood draw will be scheduled at an [facility]   3) Valentina will be contacted once testing is completed to schedule a return genetic counseling visit, in which the results from testing and medical management recommendations will be discussed.  Test turn around time: approximately 4-6 weeks.        Sparkle Piper MS, [facility] [place]  Licensed, Certified Genetic Counselor      I spent 40 minutes on the date of the encounter doing chart review, history and exam, documentation and further activities as noted above.

## 2025-02-05 NOTE — TELEPHONE ENCOUNTER
Prior Authorization Approval    Medication: WEGOVY 0.25 MG/0.5ML SC SOAJ  Authorization Effective Date: 2/5/2025  Authorization Expiration Date: 12/31/2025  Approved Dose/Quantity: 2/28  Reference #: [id number]   Insurance Company: Mark ([facility]) - Phone [phone] Fax [phone]  Expected CoPay: $    CoPay Card Available:      Financial Assistance Needed:   [place] [facility] [place] [address], [address]  Pharmacy Notified: Yes  Patient Notified: YEs

## 2025-02-07 ENCOUNTER — MEDICAL CORRESPONDENCE (OUTPATIENT)
Dept: HEALTH INFORMATION MANAGEMENT | Facility: CLINIC | Age: 65
End: 2025-02-07
Payer: COMMERCIAL

## 2025-02-10 ENCOUNTER — LAB (OUTPATIENT)
Dept: LAB | Facility: CLINIC | Age: 65
End: 2025-02-10
Payer: COMMERCIAL

## 2025-02-10 DIAGNOSIS — Z86.0100 HISTORY OF COLONIC POLYPS: ICD-10-CM

## 2025-02-11 ENCOUNTER — ANTICOAGULATION THERAPY VISIT (OUTPATIENT)
Dept: ANTICOAGULATION | Facility: CLINIC | Age: 65
End: 2025-02-11
Payer: COMMERCIAL

## 2025-02-11 DIAGNOSIS — Z79.01 LONG TERM (CURRENT) USE OF ANTICOAGULANTS: ICD-10-CM

## 2025-02-11 DIAGNOSIS — I26.99 PULMONARY EMBOLISM WITH INFARCTION (H): Primary | ICD-10-CM

## 2025-02-11 DIAGNOSIS — Z86.711 HISTORY OF PULMONARY EMBOLISM: ICD-10-CM

## 2025-02-11 LAB — INR (EXTERNAL): 2.1

## 2025-02-11 NOTE — PROGRESS NOTES
ANTICOAGULATION MANAGEMENT     Valentina Olmedo 64 year old female is on warfarin with therapeutic INR result. (Goal INR 2.0-3.0)    Recent labs: (last 7 days)     02/11/25  0000   INR 2.1       ASSESSMENT     Source(s): Chart Review and Home Care/Facility Nurse - [facility]    Warfarin doses taken: Warfarin taken as instructed  Diet: No new diet changes identified  Medication/supplement changes:  will start Wegovy once weekly injection today  Concurrent use of SEMAGLUTIDE and WARFARIN may result in increased warfarin exposure.  New illness, injury, or hospitalization: No  Signs or symptoms of bleeding or clotting: No  Previous result: Subtherapeutic  Additional findings: None       PLAN     Recommended plan for ongoing change(s) affecting INR     Dosing Instructions: Continue your current warfarin dose with next INR in 1 week       Summary  As of 2/11/2025      Full warfarin instructions:  5 mg every Tue, Fri; 7.5 mg all other days   Next INR check:  2/18/2025               Telephone call with [facility] nurse who verbalizes understanding and agrees to plan    Orders given to  Homecare nurse/facility to recheck    Education provided: Please call back if any changes to your diet, medications or how you've been taking warfarin  Interaction IS anticipated between warfarin and wegovy    Plan made per [facility] anticoagulation protocol    Kayleigh Meza RN  2/11/2025  Anticoagulation Clinic  digital[employer] [place] for routing messages: raquel MATHUR  [facility] patient phone line: [phone]        _______________________________________________________________________     Anticoagulation Episode Summary       Current INR goal:  2.0-3.0   TTR:  51.7% (1 y)   Target end date:  Indefinite   Send INR reminders to:  PIPO MATHUR    Indications    History of pulmonary embolism (Resolved) [Z86.711]  Pulmonary embolism with infarction (H) [I26.99]  Long term (current) use of anticoagulants [Z79.01]  History of pulmonary embolism  [Z86.739]             Comments:  --             Anticoagulation Care Providers       Provider Role Specialty Phone number    Promise Vanegas MD Referring Family Medicine [phone]    Donald Rooney MD Referring Internal Medicine [phone]    Jose Maria Morin MD Referring Family Medicine [phone]

## 2025-02-12 ENCOUNTER — OFFICE VISIT (OUTPATIENT)
Dept: SLEEP MEDICINE | Facility: CLINIC | Age: 65
End: 2025-02-12
Payer: COMMERCIAL

## 2025-02-12 VITALS
RESPIRATION RATE: 17 BRPM | DIASTOLIC BLOOD PRESSURE: 80 MMHG | BODY MASS INDEX: 55.97 KG/M2 | HEIGHT: 66 IN | SYSTOLIC BLOOD PRESSURE: 134 MMHG | HEART RATE: 85 BPM | OXYGEN SATURATION: 96 %

## 2025-02-12 DIAGNOSIS — J44.9 CHRONIC OBSTRUCTIVE PULMONARY DISEASE, UNSPECIFIED COPD TYPE (H): ICD-10-CM

## 2025-02-12 DIAGNOSIS — E66.01 CLASS 3 SEVERE OBESITY DUE TO EXCESS CALORIES WITH SERIOUS COMORBIDITY AND BODY MASS INDEX (BMI) OF 50.0 TO 59.9 IN ADULT (H): ICD-10-CM

## 2025-02-12 DIAGNOSIS — E66.813 CLASS 3 SEVERE OBESITY DUE TO EXCESS CALORIES WITH SERIOUS COMORBIDITY AND BODY MASS INDEX (BMI) OF 50.0 TO 59.9 IN ADULT (H): ICD-10-CM

## 2025-02-12 DIAGNOSIS — I10 ESSENTIAL HYPERTENSION: ICD-10-CM

## 2025-02-12 DIAGNOSIS — Z86.73 HISTORY OF STROKE: ICD-10-CM

## 2025-02-12 DIAGNOSIS — F17.200 NICOTINE USE DISORDER: ICD-10-CM

## 2025-02-12 DIAGNOSIS — G47.33 OSA (OBSTRUCTIVE SLEEP APNEA): Primary | ICD-10-CM

## 2025-02-12 PROCEDURE — 99213 OFFICE O/P EST LOW 20 MIN: CPT | Performed by: NURSE PRACTITIONER

## 2025-02-12 RX ORDER — ZOLPIDEM TARTRATE 5 MG/1
TABLET ORAL
Qty: 1 TABLET | Refills: 0 | Status: SHIPPED | OUTPATIENT
Start: 2025-02-12

## 2025-02-12 ASSESSMENT — SLEEP AND FATIGUE QUESTIONNAIRES
HOW LIKELY ARE YOU TO NOD OFF OR FALL ASLEEP WHILE SITTING AND READING: SLIGHT CHANCE OF DOZING
HOW LIKELY ARE YOU TO NOD OFF OR FALL ASLEEP WHEN YOU ARE A PASSENGER IN A CAR FOR AN HOUR WITHOUT A BREAK: SLIGHT CHANCE OF DOZING
HOW LIKELY ARE YOU TO NOD OFF OR FALL ASLEEP WHILE LYING DOWN TO REST IN THE AFTERNOON WHEN CIRCUMSTANCES PERMIT: SLIGHT CHANCE OF DOZING
HOW LIKELY ARE YOU TO NOD OFF OR FALL ASLEEP WHILE SITTING AND TALKING TO SOMEONE: WOULD NEVER DOZE
HOW LIKELY ARE YOU TO NOD OFF OR FALL ASLEEP IN A CAR, WHILE STOPPED FOR A FEW MINUTES IN TRAFFIC: WOULD NEVER DOZE
HOW LIKELY ARE YOU TO NOD OFF OR FALL ASLEEP WHILE SITTING QUIETLY AFTER LUNCH WITHOUT ALCOHOL: WOULD NEVER DOZE
HOW LIKELY ARE YOU TO NOD OFF OR FALL ASLEEP WHILE WATCHING TV: SLIGHT CHANCE OF DOZING
HOW LIKELY ARE YOU TO NOD OFF OR FALL ASLEEP WHILE SITTING INACTIVE IN A PUBLIC PLACE: WOULD NEVER DOZE

## 2025-02-12 NOTE — PATIENT INSTRUCTIONS
Your BMI is Body mass index is 55.97 kg/m .    What is BMI?  Body mass index (BMI) is one way to tell whether you are at a healthy weight, overweight, or obese. It measures your weight in relation to your height.  A BMI of 18.5 to 24.9 is in the healthy range. A person with a BMI of 25 to 29.9 is considered overweight, and someone with a BMI of 30 or greater is considered obese.  Another way to find out if you are at risk for health problems caused by overweight and obesity is to measure your waist. If you are a woman and your waist is more than 35 inches, or if you are a man and your waist is more than 40 inches, your risk of disease may be higher.  More than two-thirds of American adults are considered overweight or obese. Being overweight or obese increases the risk for further weight gain.  Excess weight may lead to heart disease and diabetes. Creating and following plans for healthy eating and physical activity may help you improve your health.    Methods for maintaining or losing weight.  Weight control is part of healthy lifestyle and includes exercise, emotional health, and healthy eating habits.  Careful eating habits lifelong is the mainstay of weight control.  Though there are significant health benefits from weight loss, long-term weight loss with diet alone may be very difficult to achieve- studies show long-term success with dietary management in less than 10% of people. Attaining a healthy weight may be especially difficult to achieve in those with severe obesity. In some cases, medications, devices and surgical management might be considered.    What can you do?  If you are overweight or obese and are interested in methods for weight loss, you should discuss this with your provider. In addition, we recommend that you review healthy life styles and methods for weight loss available through the National Institutes of Health patient information sites:    http://win.niddk.nih.gov/publications/index.htm     Drive Safe... Drive Alive     Sleep health profoundly affects your health, mood, and your safety. 33% of the population (one in three of us) is not getting enough sleep and many have a sleep disorder. Not getting enough sleep or having an untreated / undertreated sleep condition may make us sleepy without even knowing it. In fact, our driving could be dramatically impaired due to our sleep health. As your provider, here are some things I would like you to know about driving:     Here are some warning signs for impairment and dangerous drowsy driving:              -Having been awake more than 16 hours               -Looking tired               -Eyelid drooping              -Head nodding (it could be too late at this point)              -Driving for more than 30 minutes     Some things you could do to make the driving safer if you are experiencing some drowsiness:              -Stop driving and rest              -Call for transportation              -Make sure your sleep disorder is adequately treated     Some things that have been shown NOT to work when experiencing drowsiness while driving:              -Turning on the radio              -Opening windows              -Eating any  distracting  /  entertaining  foods (e.g., sunflower seeds, candy, or any other)              -Talking on the phone      Your decision may not only impact your life, but also the life of others. Please, remember to drive safe for yourself and all of us.     Your There is no height or weight on file to calculate BMI.  Weight management is a personal decision.  If you are interested in exploring weight loss strategies, the following discussion covers the approaches that may be successful. Body mass index (BMI) is one way to tell whether you are at a healthy weight, overweight, or obese. It measures your weight in relation to your height.  A BMI of 18.5 to 24.9 is in the healthy range. A  person with a BMI of 25 to 29.9 is considered overweight, and someone with a BMI of 30 or greater is considered obese. More than two-thirds of American adults are considered overweight or obese.  Being overweight or obese increases the risk for further weight gain. Excess weight may lead to heart disease and diabetes.  Creating and following plans for healthy eating and physical activity may help you improve your health.  Weight control is part of healthy lifestyle and includes exercise, emotional health, and healthy eating habits. Careful eating habits lifelong are the mainstay of weight control. Though there are significant health benefits from weight loss, long-term weight loss with diet alone may be very difficult to achieve- studies show long-term success with dietary management in less than 10% of people. Attaining a healthy weight may be especially difficult to achieve in those with severe obesity. In some cases, medications, devices and surgical management might be considered.  What can you do?  If you are overweight or obese and are interested in methods for weight loss, you should discuss this with your provider.   Consider reducing daily calorie intake by 500 calories.   Keep a food journal.   Avoiding skipping meals, consider cutting portions instead.    Diet combined with exercise helps maintain muscle while optimizing fat loss. Strength training is particularly important for building and maintaining muscle mass. Exercise helps reduce stress, increase energy, and improves fitness. Increasing exercise without diet control, however, may not burn enough calories to loose weight.     Start walking three days a week 10-20 minutes at a time  Work towards walking thirty minutes five days a week   Eventually, increase the speed of your walking for 1-2 minutes at time    In addition, we recommend that you review healthy lifestyles and methods for weight loss available through the National Institutes of Health  patient information sites:  http://win.niddk.nih.gov/publications/index.htm    And look into health and wellness programs that may be available through your health insurance provider, employer, local community center, or onur club.

## 2025-02-12 NOTE — PROGRESS NOTES
Obstructive Sleep Apnea - PAP Follow-Up Visit:    Chief Complaint   Patient presents with    Sleep Problem     Sleep study and cpap follow-up        Valentina Olmedo comes in today for follow-up of their severe sleep apnea, managed with CPAP.     Patient has a past medical history notable for severe obstructive sleep apnea, previously treated with CPAP therapy, hypertension, hypothyroidism, GERD, schizoaffective disorder, anxiety disorder, PTSD, history of pulmonary embolism, on long-term anticoagulation medication, morbid obesity, previous tobacco usage. Patient is disabled as she has limited mobility. She uses a walker for short distances and a wheelchair for longer distances.     Sleep study [place]  5 unintentional naps per day  10 pm retires to sleep  Sleep latency ~ 60 min  830 am wake-up time with the use of an alarm  Does not work outside of home  Disabled  Broke her back, now in w/c  Tylenol is what she uses for pain, unable to use NSAIDs  Warfarin for pulm emboli, long-term use  Prazosin PTSD   Losartan for BP  Physically deconditioned  Significant tobacco use in the past.  1/2 pack/day for 45 years.  Quit smoking February, 2023.    Do you use a CPAP Machine at home: (Patient-Rptd) Yes  Overall, on a scale of 0-10 how would you rate your CPAP (0 poor, 10 great): (Patient-Rptd) 5  Is your mask comfortable: (Patient-Rptd) No  If not, why: (Patient-Rptd) i feel it pressing on my face  Is you mask leaking: (Patient-Rptd) Yes  If yes, where do you feel it: (Patient-Rptd) sides  How many night per week does the mask leak (0-7): (Patient-Rptd) every day  Do you notice snoring with mask on: (Patient-Rptd) No  Do you notice gasping arousals with mask on: (Patient-Rptd) Yes  Are you having significant oral or nasal dryness: (Patient-Rptd) Yes  Is the pressure setting comfortable: (Patient-Rptd) No  In not, why:    What type of mask do you use: (Patient-Rptd) Nasal Mask  What is your typical bedtime: (Patient-Rptd)  10  How long does it take you to go to sleep on PAP therapy: (Patient-Rptd) almost a hour sometimes  What time do you typically get out of bed for the day: (Patient-Rptd) 9  How many hours on average per night are you using PAP therapy: (Patient-Rptd) try using 4 hours  How many hours are you sleeping per night: (Patient-Rptd) 9  Do you feel well rested in the morning: (Patient-Rptd) Yes    EPWORTH SLEEPINESS SCALE         2/12/2025     2:07 PM    Antigo Sleepiness Scale ( SUSAN Lopez  [id number]<br>ESS - [place]/English - Final version - 21 Nov 07 - [facility] [place].)   Sitting and reading Slight chance of dozing   Watching TV Slight chance of dozing   Sitting, inactive in a public place (e.g. a theatre or a meeting) Would never doze   As a passenger in a car for an hour without a break Slight chance of dozing   Lying down to rest in the afternoon when circumstances permit Slight chance of dozing   Sitting and talking to someone Would never doze   Sitting quietly after a lunch without alcohol Would never doze   In a car, while stopped for a few minutes in traffic Would never doze   Antigo Score (MC) 4   Antigo Score (Sleep) 4        Patient-reported       INSOMNIA SEVERITY INDEX (TORY)          2/12/2025     2:00 PM   Insomnia Severity Index (TORY)   Difficulty falling asleep 2   Difficulty staying asleep 4   Problems waking up too early 1   How SATISFIED/DISSATISFIED are you with your CURRENT sleep pattern? 2   How NOTICEABLE to others do you think your sleep problem is in terms of impairing the quality of your life? 0   How WORRIED/DISTRESSED are you about your current sleep problem? 2   To what extent do you consider your sleep problem to INTERFERE with your daily functioning (e.g. daytime fatigue, mood, ability to function at work/daily chores, concentration, memory, mood, etc.) CURRENTLY? 1   TORY Total Score 12        Patient-reported       Guidelines for Scoring/Interpretation:  Total score categories:  0-7  = No clinically significant insomnia   8-14 = Subthreshold insomnia   15-21 = Clinical insomnia (moderate severity)  22-28 = Clinical insomnia (severe)  Used via courtesy of www.myhealth.va.gov with permission from Lee Lorenzo PhD., [facility]    Social History:   to René, who is her bed partner.  René is present for this appointment today.        Past Sleep Evaluations:  Sleep apnea   PSG ( 2/3/2021) AHI 91, SpO2 sheri 78.6%, 9.2 minutes <= 88%. AutoCPAP.    9/16/2024 [facility] Sleep Study (369.0 lbs) - AHI 67.2, RDI 85.4, Supine AHI -, REM AHI -, Low O2% 84.0%, Time Spent <=88% 7.4, Time Spent <=89% 22.2. Treatment was titrated to a pressure of BiLevel 8/4/0 with an AHI 5.3. Time spent in REM supine at this pressure was - minutes.      [facility]      Social History:   to René, who is her bed partner.  René is present for this appointment today.        Past Sleep Evaluations:    PSG ( 2/3/2021) AHI 91, SpO2 sheri 78.6%, 9.2 minutes <= 88%. AutoCPAP.    9/16/2024 [facility] [facility] Sleep Study (369.0 lbs) - AHI 67.2, RDI 85.4, Supine AHI -, REM AHI -, Low O2% 84.0%, Time Spent <=88% 7.4, Time Spent <=89% 22.2. Treatment was titrated to a pressure of BiLevel 8/4/0 with an AHI 5.3. Time spent in REM supine at this pressure was - minutes.     Reviewed by team:  Tobacco  Allergies  Meds  Problems  Med Hx  Surg Hx  Fam Hx        Reviewed by provider:  Tobacco  Allergies  Meds  Problems  Med Hx  Surg Hx  Fam Hx           Problem List:  Patient Active Problem List    Diagnosis Date Noted    History of stroke 01/29/2025     Priority: Medium    Dysuria 10/25/2024     Priority: Medium    Nicotine use disorder 07/09/2024     Priority: Medium    Hematoma of right lower leg 06/24/2024     Priority: Medium    History of colonic polyps 05/21/2024     Priority: Medium     2/5/2025 Seen by  with the following recommendations:  Valentina elected to proceed with germline  genetic testing, including APC/polyposis testing with automatic reflex to the colorectal cancer panel..       Chronic intractable pain 04/02/2024     Priority: Medium    Strain of lumbar paraspinal muscle, initial encounter 04/02/2024     Priority: Medium    Cervical stenosis of spinal canal 04/02/2024     Priority: Medium    Cervical radiculopathy 04/02/2024     Priority: Medium    Intertrigo 01/03/2024     Priority: Medium    Dysfunction of both eustachian tubes 01/03/2024     Priority: Medium    Bilateral sensorineural hearing loss 11/17/2023     Priority: Medium    Right knee injury, sequela 11/17/2023     Priority: Medium    History of pulmonary embolism 07/28/2023     Priority: Medium    Problem related to housing and economic circumstances 07/25/2023     Priority: Medium    Bilateral hand pain 05/26/2023     Priority: Medium    Vision changes 03/09/2023     Priority: Medium    Seasonal affective disorder 02/19/2023     Priority: Medium    History of melanoma 10/28/2022     Priority: Medium    Long term (current) use of anticoagulants 08/22/2022     Priority: Medium    PTSD (post-traumatic stress disorder) 08/12/2022     Priority: Medium    Physical deconditioning 03/16/2022     Priority: Medium    Idiopathic osteoporosis 10/12/2021     Priority: Medium    Pulmonary embolism with infarction (H) 09/24/2021     Priority: Medium    Peripheral polyneuropathy 08/20/2021     Priority: Medium    Venous stasis 08/08/2021     Priority: Medium    Class 3 severe obesity due to excess calories with serious comorbidity and body mass index (BMI) of 50.0 to 59.9 in adult (H) 08/06/2021     Priority: Medium    Mixed hyperlipidemia 08/06/2021     Priority: Medium    Seasonal and perennial allergic rhinoconjunctivitis of right eye 07/29/2021     Priority: Medium    Essential hypertension 12/22/2020     Priority: Medium    Fracture of vertebra due to osteoporosis with routine healing, subsequent encounter 09/30/2020      Priority: Medium    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 According to the patient, the tumor was benign. She is not sure whether or not the tumor belonged to the thyroid gland. The surgery was done here at the [facility]. In January 2011, she was diagnosed with kidney stones. She was found to have an elevated calcium level of 11.7, with a PTH level of 368. Stone analysis showed calcium oxalate, calcium phosphate and carbonate form of calcium phosphate hydroxyl. She was also found to have vitamin D deficiency with vitamin D levels between 20 and 25 ng per mL and was given 50,000 units ergocalciferol on a weekly basis. She's been taking the high dose vitamin D for over a year now. Most recent labs from October only checked vitamin D3 level which was mildly low at 23.    Thyroid sestamibi scan and neck ultrasound showed enlarged parathyroid adenoma measuring 2.5 cm on the right side. In addition, on the left side, there was a description of a hypoechoic ovoid focus superior and lateral to the left lobe of the thyroid, measuring 1.7 cm, which was considered to represent either a lymph node or another parathyroid adenoma. She underwent completion total thyroidectomy and right superior parathyroidectomy at [facility] with Dr. Keily Gregory on 11/19/11. 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 She is currently on 237  g levothyroxine daily. Last time the dose of levothyroxine was changed was in March 2012. Last TSH from 6/13/12 was 0.42.      Nonintractable epilepsy without status epilepticus (H) 01/07/2020     Priority: Medium    Esophageal reflux 01/07/2020     Priority: Medium    Chronic obstructive pulmonary disease (H)  "01/07/2020     Priority: Medium    Schizoaffective disorder, depressive type (H) 02/06/2019     Priority: Medium    DNR (do not resuscitate) 09/13/2018     Priority: Medium    CHRIS (obstructive sleep apnea) 06/12/2018     Priority: Medium    Major depressive disorder, recurrent episode, moderate (H) 09/29/2015     Priority: Medium    Closed head injury 03/04/2015     Priority: Medium    HLD (hyperlipidemia) 11/12/2013     Priority: Medium    Hypothyroidism 11/12/2013     Priority: Medium    Migraine headache 11/12/2013     Priority: Medium    Adrenal mass, left 09/17/2013     Priority: Medium          /80   Pulse 85   Resp 17   Ht 1.676 m (5' 6\")   LMP  (LMP Unknown)   SpO2 96%   BMI 55.97 kg/m      Impression/Plan:    ICD-10-CM    1. CHRIS (obstructive sleep apnea)  G47.33 Comprehensive Sleep Study     CPAP Order for DME - ONLY FOR DME      2. Essential hypertension  I10 Comprehensive Sleep Study     CPAP Order for DME - ONLY FOR DME      3. History of stroke  Z86.73 Comprehensive Sleep Study     CPAP Order for DME - ONLY FOR DME      4. Chronic obstructive pulmonary disease, unspecified COPD type (H)  J44.9 Comprehensive Sleep Study     CPAP Order for DME - ONLY FOR DME      5. Class 3 severe obesity due to excess calories with serious comorbidity and body mass index (BMI) of 50.0 to 59.9 in adult (H)  E66.813 Comprehensive Sleep Study    Z68.43 CPAP Order for DME - ONLY FOR DME    E66.01       6. Nicotine use disorder  F17.200 Comprehensive Sleep Study     CPAP Order for DME - ONLY FOR DME          Valentina Olmedo is a pleasant 64-year-old female who presents to the sleep medicine clinic today in a wheelchair, in the company of her , Jass, for an evaluation of efficacy and compliance in the treatment of her known severe obstructive sleep apnea with PAP therapy.  We did take the time to review her last sleep study completed in September 2024 and also reviewed the last 4 weeks data compiled from her " CPAP machine.  Patient was given accolades for her daily effort and encouraged to continue using it.  Although she is not quite reaching the 4-hour benchmark, she is at 3 hours, 16 minutes, she is making a concerted effort and was given credit for that.  We did review symptoms of untreated sleep apnea in the context of her many medical diagnoses.  She appreciates the benefits that CPAP does bring to her.  We did discuss methods that she could employ to improve her compliance.  Patient will also undergo a full night titration study to assist in fine-tuning a more appropriate pressure setting for her.  She may also require bilevel therapy.  Her last sleep study employ the use of bilevel therapy, which was difficult for patient to tolerate.  We discussed what bilevel therapy was as a pressure delivery system and how it can benefit her.  She is eager to try it again.  Valentina has been asked to follow-up with a [employer] message when she has completed her sleep study so we may amend her plan of care collaboratively.  She has been asked to follow-up with the sleep medicine clinic as well 8 to 10 weeks after her sleep study has been completed with an in person visit.  In addition, recommend she optimize her sleep schedule as well as her sleep hygiene practices to mitigate any further sleep disruption.  Valentina does not drive a car.  Weight management to BMI of 30.0 or less.    29 minutes spent with patient, all of which were spent face-to-face counseling, consulting, coordinating plan of care.  The longitudinal plan of care for the diagnosis(es)/condition(s) as documented were addressed during this visit. Due to the added complexity in care, I will continue to support Valentina in the subsequent management and with ongoing continuity of care.    SHAHRZAD Corbin CNP    CC:  Jose Maria Morin,

## 2025-02-12 NOTE — NURSING NOTE
"Chief Complaint   Patient presents with    Sleep Problem     Sleep study and cpap follow-up        Initial /80   Pulse 85   Resp 17   Ht 1.676 m (5' 6\")   LMP  (LMP Unknown)   SpO2 96%   BMI 55.97 kg/m   Estimated body mass index is 55.97 kg/m  as calculated from the following:    Height as of this encounter: 1.676 m (5' 6\").    Weight as of 1/29/25: 157.3 kg (346 lb 12.8 oz).    Medication Reconciliation: complete    DME: [facility]       TAMIKA Germain      "

## 2025-02-13 ENCOUNTER — MEDICAL CORRESPONDENCE (OUTPATIENT)
Dept: HEALTH INFORMATION MANAGEMENT | Facility: CLINIC | Age: 65
End: 2025-02-13

## 2025-02-13 LAB — INTERPRETATION SERPL IEP-IMP: NORMAL

## 2025-02-18 ENCOUNTER — TELEPHONE (OUTPATIENT)
Dept: FAMILY MEDICINE | Facility: CLINIC | Age: 65
End: 2025-02-18
Payer: COMMERCIAL

## 2025-02-18 ENCOUNTER — ANTICOAGULATION THERAPY VISIT (OUTPATIENT)
Dept: ANTICOAGULATION | Facility: CLINIC | Age: 65
End: 2025-02-18
Payer: COMMERCIAL

## 2025-02-18 DIAGNOSIS — Z79.01 LONG TERM (CURRENT) USE OF ANTICOAGULANTS: ICD-10-CM

## 2025-02-18 DIAGNOSIS — I26.99 PULMONARY EMBOLISM WITH INFARCTION (H): Primary | ICD-10-CM

## 2025-02-18 DIAGNOSIS — Z86.711 HISTORY OF PULMONARY EMBOLISM: ICD-10-CM

## 2025-02-18 LAB — INR (EXTERNAL): 2.1 (ref 0.9–1.1)

## 2025-02-18 NOTE — PROGRESS NOTES
ANTICOAGULATION MANAGEMENT     Valentina Olmedo 64 year old female is on warfarin with therapeutic INR result. (Goal INR 2.0-3.0)    Recent labs: (last 7 days)     02/18/25  1048   INR 2.1*       ASSESSMENT     Source(s): Chart Review and Home Care/Facility Nurse     Warfarin doses taken: Warfarin taken as instructed  Diet: No new diet changes identified  Medication/supplement changes:  Wegovy started on 2/18/25 subsequent INRs may be increased or decreased. Closer INR monitoring recommended.  New illness, injury, or hospitalization: No  Signs or symptoms of bleeding or clotting: No  Previous result: Therapeutic last visit; previously outside of goal range  Additional findings: None       PLAN     Recommended plan for no diet, medication or health factor changes affecting INR     Dosing Instructions: Continue your current warfarin dose with next INR in 1 week       Summary  As of 2/18/2025      Full warfarin instructions:  5 mg every Tue, Fri; 7.5 mg all other days   Next INR check:  2/25/2025               Telephone call with Terry home care nurse who verbalizes understanding and agrees to plan    Orders given to  Homecare nurse/facility to recheck    Education provided: Please call back if any changes to your diet, medications or how you've been taking warfarin    Plan made per [facility] anticoagulation protocol    Emerita Ca RN  2/18/2025  Anticoagulation Clinic  [employer] for routing messages: raquel MATHUR  [facility] patient phone line: [phone]        _______________________________________________________________________     Anticoagulation Episode Summary       Current INR goal:  2.0-3.0   TTR:  51.8% (1 y)   Target end date:  Indefinite   Send INR reminders to:  PIPO MATHUR    Indications    History of pulmonary embolism (Resolved) [Z86.711]  Pulmonary embolism with infarction (H) [I26.99]  Long term (current) use of anticoagulants [Z79.01]  History of pulmonary embolism [Z86.711]              Comments:  --             Anticoagulation Care Providers       Provider Role Specialty Phone number    Promise Vanegas MD Referring Family Medicine [phone]    Donald Rooney MD Referring Internal Medicine [phone]    Jose Maria Morin MD Referring Family Medicine [phone]

## 2025-02-18 NOTE — TELEPHONE ENCOUNTER
Called and left detailed message for RIDGE Stewart with [facility], with okay for verbal orders as requested, per Dr. Morin.      Paula Rollins RN  [facility]

## 2025-02-18 NOTE — TELEPHONE ENCOUNTER
Symptoms    Describe your symptoms: BP of 124/56, patient was told to inform PCP of any low BPs    Any pain: No    How long have you been having symptoms: BP was taken today by home care RN    Have you been seen for this:  Yes: last OV 1/29/25    Appointment offered?: No    Triage offered?: Yes: Clinic RN unavailable at time of call. Patient declined red-line triage.    [place] Pharm[place]   [facility] [place] [address], [facility] [address]  [address] [place] [address], [address] [place] [address]  Phone: [phone] Fax: [phone]    Could we send this information to you in [employer] or would you prefer to receive a phone call?:   Patient would prefer a phone call   Okay to leave a detailed message?: Yes at Cell number on file:    Telephone Information:   Mobile [phone]

## 2025-02-18 NOTE — TELEPHONE ENCOUNTER
Terry, with [facility], calls needing a verbal order for skilled nursing to inject Wegovy 0.25mg once a week.     Okay to leave detailed voice message, if no answer.  Terry - [phone]

## 2025-02-25 ENCOUNTER — ANTICOAGULATION THERAPY VISIT (OUTPATIENT)
Dept: ANTICOAGULATION | Facility: CLINIC | Age: 65
End: 2025-02-25
Payer: COMMERCIAL

## 2025-02-25 DIAGNOSIS — I26.99 PULMONARY EMBOLISM WITH INFARCTION (H): Primary | ICD-10-CM

## 2025-02-25 DIAGNOSIS — Z79.01 LONG TERM (CURRENT) USE OF ANTICOAGULANTS: ICD-10-CM

## 2025-02-25 DIAGNOSIS — Z86.711 HISTORY OF PULMONARY EMBOLISM: ICD-10-CM

## 2025-02-25 LAB — INR (EXTERNAL): 2 (ref 0.9–1.1)

## 2025-02-25 NOTE — PROGRESS NOTES
ANTICOAGULATION MANAGEMENT     Valentina Olmedo 65 year old female is on warfarin with therapeutic INR result. (Goal INR 2.0-3.0)    Recent labs: (last 7 days)     02/25/25  0000   INR 2.0*       ASSESSMENT     Source(s): Chart Review and Home Care/Facility Nurse     Warfarin doses taken: Warfarin taken as instructed  Diet: No new diet changes identified  Medication/supplement changes: None noted  New illness, injury, or hospitalization: No  Signs or symptoms of bleeding or clotting: No  Previous result: Therapeutic last 2(+) visits  Additional findings: None       PLAN     Recommended plan for no diet, medication or health factor changes affecting INR     Dosing Instructions: Continue your current warfarin dose with next INR in 1 week       Summary  As of 2/25/2025      Full warfarin instructions:  5 mg every Tue, Fri; 7.5 mg all other days   Next INR check:  3/4/2025               Telephone call with Roselyn home care nurse who verbalizes understanding and agrees to plan and who agrees to plan and repeated back plan correctly    Orders given to  Homecare nurse/facility to recheck    Education provided: Goal range and lab monitoring: goal range and significance of current result and Importance of therapeutic range    Plan made per [facility] anticoagulation protocol    Angus Vazquez RN  2/25/2025  Anticoagulation Clinic  [employer] for routing messages: raquel MATHUR  [facility] patient phone line: [phone]        _______________________________________________________________________     Anticoagulation Episode Summary       Current INR goal:  2.0-3.0   TTR:  52.2% (1 y)   Target end date:  Indefinite   Send INR reminders to:  PIPO MATHUR    Indications    History of pulmonary embolism (Resolved) [Z86.711]  Pulmonary embolism with infarction (H) [I26.99]  Long term (current) use of anticoagulants [Z79.01]  History of pulmonary embolism [Z86.711]             Comments:  --             Anticoagulation Care Providers        Provider Role Specialty Phone number    Promise Vanegas MD Referring Family Medicine [phone]    Donald Rooney MD Referring Internal Medicine [phone]    Jose Maria Morin MD Referring Family Medicine [phone]

## 2025-02-26 ENCOUNTER — TELEPHONE (OUTPATIENT)
Dept: PULMONOLOGY | Facility: CLINIC | Age: 65
End: 2025-02-26
Payer: COMMERCIAL

## 2025-02-26 NOTE — TELEPHONE ENCOUNTER
Prior Authorization Retail Medication Request    Medication/Dose: Advair hfa [id number]  Diagnosis and ICD code (if different than what is on RX):    New/renewal/insurance change PA/secondary ins. PA:  Previously Tried and Failed:    Rationale:      Insurance   Primary:     [employer]VANTAGE     Insurance ID:  [id number]     Secondary (if applicable):  Insurance ID:      Pharmacy Information (if different than what is on RX)  Name:  ***  Phone:  ***  Fax:***    Clinic Information  Preferred routing pool for dept communication: ***

## 2025-02-27 ENCOUNTER — MEDICAL CORRESPONDENCE (OUTPATIENT)
Dept: HEALTH INFORMATION MANAGEMENT | Facility: CLINIC | Age: 65
End: 2025-02-27
Payer: COMMERCIAL

## 2025-03-04 ENCOUNTER — ANTICOAGULATION THERAPY VISIT (OUTPATIENT)
Dept: ANTICOAGULATION | Facility: CLINIC | Age: 65
End: 2025-03-04
Payer: COMMERCIAL

## 2025-03-04 DIAGNOSIS — Z86.711 HISTORY OF PULMONARY EMBOLISM: ICD-10-CM

## 2025-03-04 DIAGNOSIS — I26.99 PULMONARY EMBOLISM WITH INFARCTION (H): Primary | ICD-10-CM

## 2025-03-04 DIAGNOSIS — Z79.01 LONG TERM (CURRENT) USE OF ANTICOAGULANTS: ICD-10-CM

## 2025-03-04 LAB — INR (EXTERNAL): 1.8

## 2025-03-04 NOTE — PROGRESS NOTES
ANTICOAGULATION MANAGEMENT     Valentina Olmedo 65 year old female is on warfarin with subtherapeutic INR result. (Goal INR 2.0-3.0)    Recent labs: (last 7 days)     03/04/25  1111   INR 1.8       ASSESSMENT     Source(s): Chart Review and Patient/Caregiver Call     Warfarin doses taken: Warfarin taken as instructed  Diet: No new diet changes identified  Medication/supplement changes:  HAS BEEN ON WEGOVY SINCE 2/18/25, DOES NOT KNOW IF SHE HAS LOST WEIGHT, WILL BE INCREASING DOSE SOMETIME SOON  New illness, injury, or hospitalization: No  Signs or symptoms of bleeding or clotting: No  Previous result: Therapeutic last 2(+) visits  Additional findings: None       PLAN     Recommended plan for ongoing change(s) affecting INR     Dosing Instructions: Increase your warfarin dose (5.3% change) with next INR in 1 week       Summary  As of 3/4/2025      Full warfarin instructions:  5 mg every Fri; 7.5 mg all other days   Next INR check:  3/11/2025               Telephone call with [facility] nurse who agrees to plan and repeated back plan correctly    Orders given to  Homecare nurse/facility to recheck    Education provided: Please call back if any changes to your diet, medications or how you've been taking warfarin  Goal range and lab monitoring: goal range and significance of current result, Importance of therapeutic range, and Importance of following up at instructed interval    Plan made per [facility] anticoagulation protocol    Valentina Gonsalves RN  3/4/2025  Anticoagulation Clinic  [employer] for routing messages: raquel MATHUR  [facility] patient phone line: [phone]        _______________________________________________________________________     Anticoagulation Episode Summary       Current INR goal:  2.0-3.0   TTR:  52.1% (1 y)   Target end date:  Indefinite   Send INR reminders to:  PIPO MATHUR    Indications    History of pulmonary embolism (Resolved) [Z86.711]  Pulmonary embolism with infarction (H)  [I26.99]  Long term (current) use of anticoagulants [Z79.01]  History of pulmonary embolism [Z86.711]             Comments:  --             Anticoagulation Care Providers       Provider Role Specialty Phone number    Promise Vanegas MD Referring Family Medicine [phone]    Donald Rooney MD Referring Internal Medicine [phone]    Jose Maria Morin MD Referring Family Medicine [phone]

## 2025-03-05 DIAGNOSIS — E66.01 CLASS 3 SEVERE OBESITY DUE TO EXCESS CALORIES WITH SERIOUS COMORBIDITY AND BODY MASS INDEX (BMI) OF 50.0 TO 59.9 IN ADULT (H): Primary | ICD-10-CM

## 2025-03-05 DIAGNOSIS — Z86.73 HISTORY OF STROKE: ICD-10-CM

## 2025-03-05 DIAGNOSIS — E66.813 CLASS 3 SEVERE OBESITY DUE TO EXCESS CALORIES WITH SERIOUS COMORBIDITY AND BODY MASS INDEX (BMI) OF 50.0 TO 59.9 IN ADULT (H): Primary | ICD-10-CM

## 2025-03-05 NOTE — TELEPHONE ENCOUNTER
Medication Request  Medication name: Wegovy  Requested Pharmacy:  [facility]  When was patient last seen for this?:  1/29/25  Patient offered appointment:  N/A pharmacy sent request  Okay to leave a detailed message: no    Pharmacy requesting next dose of Wegovy. Last prescribed 0.25mg 1/29/25. 0.5mg prepped below.

## 2025-03-05 NOTE — CONFIDENTIAL NOTE
We will continue titration of Wegovy according to the normal schedule.    Please schedule follow-up visit 3-4 weeks from now to review progress.  I want to make sure that she is able to eat well and that she is having minimal side effects.

## 2025-03-10 ENCOUNTER — MEDICAL CORRESPONDENCE (OUTPATIENT)
Dept: HEALTH INFORMATION MANAGEMENT | Facility: CLINIC | Age: 65
End: 2025-03-10
Payer: COMMERCIAL

## 2025-03-11 ENCOUNTER — ANTICOAGULATION THERAPY VISIT (OUTPATIENT)
Dept: ANTICOAGULATION | Facility: CLINIC | Age: 65
End: 2025-03-11
Payer: COMMERCIAL

## 2025-03-11 DIAGNOSIS — Z86.711 HISTORY OF PULMONARY EMBOLISM: ICD-10-CM

## 2025-03-11 DIAGNOSIS — I26.99 PULMONARY EMBOLISM WITH INFARCTION (H): Primary | ICD-10-CM

## 2025-03-11 DIAGNOSIS — Z79.01 LONG TERM (CURRENT) USE OF ANTICOAGULANTS: ICD-10-CM

## 2025-03-11 LAB — INR (EXTERNAL): 1.5 (ref 0.9–1.1)

## 2025-03-11 NOTE — PROGRESS NOTES
ANTICOAGULATION MANAGEMENT     Valentina Olmedo 65 year old female is on warfarin with subtherapeutic INR result. (Goal INR 2.0-3.0)    Recent labs: (last 7 days)     03/11/25  1307   INR 1.5*       ASSESSMENT     Source(s): Chart Review, Patient/Caregiver Call, and Home Care/Facility Nurse     Warfarin doses taken: Warfarin taken as instructed  Diet: No new diet changes identified  Medication/supplement changes:  Wegovy injection for last 3 weeks, has quit smoking  New illness, injury, or hospitalization: No  Signs or symptoms of bleeding or clotting: No  Previous result: Subtherapeutic  Additional findings: None       PLAN     Recommended plan for ongoing change(s) affecting INR     Dosing Instructions: booster dose then Increase your warfarin dose (10% change) with next INR in 1 week       Summary  As of 3/11/2025      Full warfarin instructions:  3/11: 12.5 mg; Otherwise 10 mg every Tue; 7.5 mg all other days   Next INR check:  3/18/2025               Telephone call with Roselyn BOO home care nurse who agrees to plan and repeated back plan correctly    Orders given to  Homecare nurse/facility to recheck    Education provided: None required    Plan made per [facility] anticoagulation protocol    Kindra Garay RN  3/11/2025  Anticoagulation Clinic  [employer] for routing messages: raquel MATHUR  [facility] patient phone line: [phone]        _______________________________________________________________________     Anticoagulation Episode Summary       Current INR goal:  2.0-3.0   TTR:  52.1% (1 y)   Target end date:  Indefinite   Send INR reminders to:  PIPO MATHUR    Indications    History of pulmonary embolism (Resolved) [Z86.711]  Pulmonary embolism with infarction (H) [I26.99]  Long term (current) use of anticoagulants [Z79.01]  History of pulmonary embolism [Z86.711]             Comments:  --             Anticoagulation Care Providers       Provider Role Specialty Phone number    Promise Vanegas MD  Referring Family Medicine [phone]    Donald Rooney MD Referring Internal Medicine [phone]    Jose Maria Morin MD Referring Family Medicine [phone]

## 2025-03-14 ENCOUNTER — MEDICAL CORRESPONDENCE (OUTPATIENT)
Dept: HEALTH INFORMATION MANAGEMENT | Facility: CLINIC | Age: 65
End: 2025-03-14
Payer: COMMERCIAL

## 2025-03-18 ENCOUNTER — ANTICOAGULATION THERAPY VISIT (OUTPATIENT)
Dept: ANTICOAGULATION | Facility: CLINIC | Age: 65
End: 2025-03-18
Payer: COMMERCIAL

## 2025-03-18 DIAGNOSIS — Z79.01 LONG TERM (CURRENT) USE OF ANTICOAGULANTS: ICD-10-CM

## 2025-03-18 DIAGNOSIS — Z86.711 HISTORY OF PULMONARY EMBOLISM: ICD-10-CM

## 2025-03-18 DIAGNOSIS — I26.99 PULMONARY EMBOLISM WITH INFARCTION (H): Primary | ICD-10-CM

## 2025-03-18 LAB — INR (EXTERNAL): 1.7 (ref 0.9–1.1)

## 2025-03-18 NOTE — PROGRESS NOTES
ANTICOAGULATION MANAGEMENT     Valentina Olmedo 65 year old female is on warfarin with subtherapeutic INR result. (Goal INR 2.0-3.0)    Recent labs: (last 7 days)     03/18/25  1046   INR 1.7*       ASSESSMENT     Source(s): Chart Review and Home Care/Facility Nurse     Warfarin doses taken: Warfarin taken as instructed  Diet: No new diet changes identified  Medication/supplement changes: None noted. Continues Wegovy.  New illness, injury, or hospitalization: No. Continues to not smoke.  Signs or symptoms of bleeding or clotting: No  Previous result: Subtherapeutic  Additional findings: None       PLAN     Recommended plan for no diet, medication or health factor changes affecting INR     Dosing Instructions: booster dose then Increase your warfarin dose (9% change) with next INR in 1 week       Summary  As of 3/18/2025      Full warfarin instructions:  3/18: 12.5 mg; Otherwise 10 mg every Tue, Thu, Sat; 7.5 mg all other days   Next INR check:  --               Telephone call with Terry home care nurse who verbalizes understanding and agrees to plan    Orders given to  Homecare nurse/facility to recheck    Education provided: Please call back if any changes to your diet, medications or how you've been taking warfarin  Symptom monitoring: monitoring for bleeding signs and symptoms and monitoring for clotting signs and symptoms    Plan made per [facility] anticoagulation protocol    Zari Cheng RN  3/18/2025  Anticoagulation Clinic  [facility] for routing messages: raquel MATHUR  [facility] patient phone line: [phone]        _______________________________________________________________________     Anticoagulation Episode Summary       Current INR goal:  2.0-3.0   TTR:  51.9% (1 y)   Target end date:  Indefinite   Send INR reminders to:  PIPO MATHUR    Indications    History of pulmonary embolism (Resolved) [Z86.711]  Pulmonary embolism with infarction (H) [I26.99]  Long term (current) use of anticoagulants  [Z79.01]  History of pulmonary embolism [Z86.711]             Comments:  --             Anticoagulation Care Providers       Provider Role Specialty Phone number    Promise Vanegas MD Referring Family Medicine [phone]    Donald Rooney MD Referring Internal Medicine [phone]    Jose Maria Morin MD Referring Family Medicine [phone]

## 2025-03-19 DIAGNOSIS — E78.2 MIXED HYPERLIPIDEMIA: ICD-10-CM

## 2025-03-20 RX ORDER — ROSUVASTATIN CALCIUM 20 MG/1
20 TABLET, COATED ORAL DAILY
Qty: 90 TABLET | Refills: 1 | Status: SHIPPED | OUTPATIENT
Start: 2025-03-20

## 2025-03-26 ENCOUNTER — OFFICE VISIT (OUTPATIENT)
Dept: FAMILY MEDICINE | Facility: CLINIC | Age: 65
End: 2025-03-26
Payer: COMMERCIAL

## 2025-03-26 ENCOUNTER — TELEPHONE (OUTPATIENT)
Dept: FAMILY MEDICINE | Facility: CLINIC | Age: 65
End: 2025-03-26

## 2025-03-26 ENCOUNTER — ANTICOAGULATION THERAPY VISIT (OUTPATIENT)
Dept: ANTICOAGULATION | Facility: CLINIC | Age: 65
End: 2025-03-26

## 2025-03-26 VITALS
HEIGHT: 66 IN | WEIGHT: 293 LBS | OXYGEN SATURATION: 95 % | DIASTOLIC BLOOD PRESSURE: 82 MMHG | SYSTOLIC BLOOD PRESSURE: 137 MMHG | BODY MASS INDEX: 47.09 KG/M2 | TEMPERATURE: 97.5 F | RESPIRATION RATE: 16 BRPM | HEART RATE: 71 BPM

## 2025-03-26 DIAGNOSIS — E66.813 CLASS 3 SEVERE OBESITY DUE TO EXCESS CALORIES WITH SERIOUS COMORBIDITY AND BODY MASS INDEX (BMI) OF 50.0 TO 59.9 IN ADULT (H): Primary | ICD-10-CM

## 2025-03-26 DIAGNOSIS — F25.1 SCHIZOAFFECTIVE DISORDER, DEPRESSIVE TYPE (H): ICD-10-CM

## 2025-03-26 DIAGNOSIS — Z86.73 HISTORY OF STROKE: ICD-10-CM

## 2025-03-26 DIAGNOSIS — I26.99 PULMONARY EMBOLISM WITH INFARCTION (H): Primary | ICD-10-CM

## 2025-03-26 DIAGNOSIS — Z86.711 HISTORY OF PULMONARY EMBOLISM: ICD-10-CM

## 2025-03-26 DIAGNOSIS — E66.01 CLASS 3 SEVERE OBESITY DUE TO EXCESS CALORIES WITH SERIOUS COMORBIDITY AND BODY MASS INDEX (BMI) OF 50.0 TO 59.9 IN ADULT (H): Primary | ICD-10-CM

## 2025-03-26 DIAGNOSIS — R11.0 NAUSEA: ICD-10-CM

## 2025-03-26 DIAGNOSIS — Z79.01 LONG TERM (CURRENT) USE OF ANTICOAGULANTS: ICD-10-CM

## 2025-03-26 DIAGNOSIS — G40.909 NONINTRACTABLE EPILEPSY WITHOUT STATUS EPILEPTICUS, UNSPECIFIED EPILEPSY TYPE (H): ICD-10-CM

## 2025-03-26 LAB — INR (EXTERNAL): 1.9

## 2025-03-26 PROCEDURE — G2211 COMPLEX E/M VISIT ADD ON: HCPCS | Performed by: FAMILY MEDICINE

## 2025-03-26 PROCEDURE — 99213 OFFICE O/P EST LOW 20 MIN: CPT | Performed by: FAMILY MEDICINE

## 2025-03-26 PROCEDURE — 3075F SYST BP GE 130 - 139MM HG: CPT | Performed by: FAMILY MEDICINE

## 2025-03-26 PROCEDURE — 3079F DIAST BP 80-89 MM HG: CPT | Performed by: FAMILY MEDICINE

## 2025-03-26 RX ORDER — SEMAGLUTIDE 1 MG/.5ML
1 INJECTION, SOLUTION SUBCUTANEOUS WEEKLY
Qty: 2 ML | Refills: 0 | Status: SHIPPED | OUTPATIENT
Start: 2025-03-26 | End: 2025-03-27

## 2025-03-26 RX ORDER — SEMAGLUTIDE 1 MG/.5ML
1 INJECTION, SOLUTION SUBCUTANEOUS WEEKLY
Qty: 2 ML | Refills: 0 | Status: SHIPPED | OUTPATIENT
Start: 2025-03-26 | End: 2025-03-26

## 2025-03-26 RX ORDER — WARFARIN SODIUM 5 MG/1
TABLET ORAL
Qty: 180 TABLET | Refills: 1 | Status: SHIPPED | OUTPATIENT
Start: 2025-03-26

## 2025-03-26 RX ORDER — ONDANSETRON 4 MG/1
4 TABLET, ORALLY DISINTEGRATING ORAL EVERY 8 HOURS PRN
Qty: 30 TABLET | Refills: 1 | Status: SHIPPED | OUTPATIENT
Start: 2025-03-26 | End: 2025-03-27

## 2025-03-26 RX ORDER — ONDANSETRON 4 MG/1
4 TABLET, ORALLY DISINTEGRATING ORAL EVERY 8 HOURS PRN
Qty: 30 TABLET | Refills: 1 | Status: SHIPPED | OUTPATIENT
Start: 2025-03-26 | End: 2025-03-26

## 2025-03-26 ASSESSMENT — ANXIETY QUESTIONNAIRES
6. BECOMING EASILY ANNOYED OR IRRITABLE: MORE THAN HALF THE DAYS
3. WORRYING TOO MUCH ABOUT DIFFERENT THINGS: MORE THAN HALF THE DAYS
2. NOT BEING ABLE TO STOP OR CONTROL WORRYING: MORE THAN HALF THE DAYS
IF YOU CHECKED OFF ANY PROBLEMS ON THIS QUESTIONNAIRE, HOW DIFFICULT HAVE THESE PROBLEMS MADE IT FOR YOU TO DO YOUR WORK, TAKE CARE OF THINGS AT HOME, OR GET ALONG WITH OTHER PEOPLE: EXTREMELY DIFFICULT
7. FEELING AFRAID AS IF SOMETHING AWFUL MIGHT HAPPEN: MORE THAN HALF THE DAYS
4. TROUBLE RELAXING: MORE THAN HALF THE DAYS
GAD7 TOTAL SCORE: 12
7. FEELING AFRAID AS IF SOMETHING AWFUL MIGHT HAPPEN: MORE THAN HALF THE DAYS
GAD7 TOTAL SCORE: 12
8. IF YOU CHECKED OFF ANY PROBLEMS, HOW DIFFICULT HAVE THESE MADE IT FOR YOU TO DO YOUR WORK, TAKE CARE OF THINGS AT HOME, OR GET ALONG WITH OTHER PEOPLE?: EXTREMELY DIFFICULT
5. BEING SO RESTLESS THAT IT IS HARD TO SIT STILL: SEVERAL DAYS
GAD7 TOTAL SCORE: 12
1. FEELING NERVOUS, ANXIOUS, OR ON EDGE: SEVERAL DAYS

## 2025-03-26 NOTE — TELEPHONE ENCOUNTER
Forms/Letter Request    Type of form/letter: Home Health Certification      Do we have the form/letter: Yes: In Dr. Starr Box    Who is the form from? Home care    Where did/will the form come from? form was faxed in    When is form/letter needed by: any    How would you like the form/letter returned: Fax : [phone]    Order details/form description: INR DONE 1.9 CALLED [facility] TO REPORT, NEW ORDERS RECEIVED FROM JEM WINN RN/PCP.     TAKE WARFARIN 10MG ON M,W,SAT.     TAKE 7.5MG AOD. RECHECK INR IN 1 WEEK, 4/1/2025    Order number (if applicable): [id number]

## 2025-03-26 NOTE — TELEPHONE ENCOUNTER
Received a VM from vinh Aparicio for [facility]. Patient will need new warfarin script as dosing has changed and she is running out early.    Per Protocol, New script for warfarin sent to pharmacy.,      10 mg every Mon, Wed, Sat; 7.5 mg all other days       Melissa Montenegro RN  Anticoagulation Nurse - [facility]

## 2025-03-26 NOTE — PROGRESS NOTES
Attempted to call [facility] but pharmacy closed at 4:30pm (hours 8:00am - 4:30pm).  Will call pharmacy back during business hours.    In reviewing the e-prescribing error messages, appears error occurred due to pharmacy record being inactive (see below screenshot).                 Paula Rollins RN  [facility]

## 2025-03-26 NOTE — PROGRESS NOTES
Assessment & Plan   Problem List Items Addressed This Visit       Class 3 severe obesity due to excess calories with serious comorbidity and body mass index (BMI) of 50.0 to 59.9 in adult (H) - Primary     This patient is 5-1/2 weeks into therapy with semaglutide for weight (with history of stroke).  She had nausea when she went up to the 0.5 mg/week dosing.  We discussed nutrition.  I have asked her to try to eliminate sugar sweetened beverages.  This will be a challenge but she is willing to try.  We will continue titration.  Zofran prescribed for nausea.  I would like to see her back in approximately 4 to 6 weeks to review progress.  The 1 mg/week dose was sent to her pharmacy today.         Relevant Medications    Semaglutide-Weight Management (WEGOVY) 1 MG/0.5ML pen    History of stroke    Relevant Medications    Semaglutide-Weight Management (WEGOVY) 1 MG/0.5ML pen    Nonintractable epilepsy without status epilepticus (H)     Not a concern today.  No recent seizure activity.         Schizoaffective disorder, depressive type (H)     Mood stable with new medication.  Continue to monitor for changes.            Other Visit Diagnoses       Nausea        Relevant Medications    ondansetron (ZOFRAN ODT) 4 MG ODT tab                  Depression Screening Follow Up        3/26/2025    11:42 AM   PHQ   PHQ-9 Total Score 11    Q9: Thoughts of better off dead/self-harm past 2 weeks More than half the days   F/U: Thoughts of suicide or self-harm No   F/U: Safety concerns No       Patient-reported         3/26/2025    11:42 AM   Last PHQ-9   1.  Little interest or pleasure in doing things 0   2.  Feeling down, depressed, or hopeless 0   3.  Trouble falling or staying asleep, or sleeping too much 1   4.  Feeling tired or having little energy 1   5.  Poor appetite or overeating 1   6.  Feeling bad about yourself 2   7.  Trouble concentrating 3   8.  Moving slowly or restless 1   Q9: Thoughts of better off dead/self-harm  "past 2 weeks 2   PHQ-9 Total Score 11    In the past two weeks have you had thoughts of suicide or self harm? No   Do you have concerns about your personal safety or the safety of others? No       Patient-reported       Follow Up Actions Taken  Crisis resource information provided in the After Visit Summary  Referred patient back to mental health provider    Discussed the following ways the patient can remain in a safe environment:  be around others    The longitudinal plan of care for the diagnosis(es)/condition(s) as documented were addressed during this visit. Due to the added complexity in care, I will continue to support Valentina in the subsequent management and with ongoing continuity of care.      Magda Montgomery is a 65 year old, presenting for the following health issues:  Weight Loss (Follow-up )        3/26/2025    11:49 AM   Additional Questions   Roomed by xl   Accompanied by spouse     Weight:   - initial impression: unsure.   - she had nausea with the first dose of the 0.5 mg/dose. \"I was sick for three days.   - she is due for injection today. No symptoms for the past 48 hours   - the process of injection went okay.   -     History of Present Illness       Reason for visit:  Wegovy  Symptom onset:  3-7 days ago   She is taking medications regularly.            Objective    /82 (BP Location: Right arm, Patient Position: Sitting, Cuff Size: Adult Large)   Pulse 71   Temp 97.5  F (36.4  C) (Oral)   Resp 16   Ht 1.676 m (5' 6\")   Wt (!) 159.6 kg (351 lb 14.4 oz)   LMP  (LMP Unknown)   SpO2 95%   BMI 56.80 kg/m    Body mass index is 56.8 kg/m .  Physical Exam  Nursing note reviewed.   Constitutional:       General: She is not in acute distress.     Appearance: Normal appearance. She is obese. She is not ill-appearing.   HENT:      Head: Normocephalic and atraumatic.   Eyes:      Extraocular Movements: Extraocular movements intact.      Conjunctiva/sclera: Conjunctivae normal.   Pulmonary:      " Effort: Pulmonary effort is normal.   Neurological:      Mental Status: She is alert and oriented to person, place, and time.   Psychiatric:         Attention and Perception: Attention normal.         Mood and Affect: Mood normal.         Speech: Speech normal.         Thought Content: Thought content normal.                Signed Electronically by: Jose Maria Morin MD

## 2025-03-26 NOTE — PROGRESS NOTES
ANTICOAGULATION MANAGEMENT     Valentina Olmedo 65 year old female is on warfarin with subtherapeutic INR result. (Goal INR 2.0-3.0)    Recent labs: (last 7 days)     03/26/25  0900   INR 1.9       ASSESSMENT     Source(s): Chart Review and Home Care/Facility Nurse     Warfarin doses taken: Less warfarin taken than planned which may be affecting INR  Diet: No new diet changes identified  Medication/supplement changes:  Wegovy dose increased last week  New illness, injury, or hospitalization: No  Signs or symptoms of bleeding or clotting: No  Previous result: Subtherapeutic  Additional findings: None       PLAN     Recommended plan for temporary change(s) and ongoing change(s) affecting INR     Dosing Instructions: change your warfarin dose (0% change)only changed the days taking 10 mg doses with next INR in 6 days       Summary  As of 3/26/2025      Full warfarin instructions:  10 mg every Mon, Wed, Sat; 7.5 mg all other days   Next INR check:  4/1/2025               Telephone call with Valentina home care nurse who verbalizes understanding and agrees to plan and who agrees to plan and repeated back plan correctly    Orders given to  Homecare nurse/facility to recheck    Education provided: Contact [phone] with any changes, questions or concerns.     Plan made per [facility] anticoagulation protocol    Zari Maria RN  3/26/2025  Anticoagulation Clinic  [employer] for routing messages: raquel MATHUR  [facility] patient phone line: [phone]        _______________________________________________________________________     Anticoagulation Episode Summary       Current INR goal:  2.0-3.0   TTR:  50.8% (1 y)   Target end date:  Indefinite   Send INR reminders to:  PIPO MATHUR    Indications    History of pulmonary embolism (Resolved) [Z86.711]  Pulmonary embolism with infarction (H) [I26.99]  Long term (current) use of anticoagulants [Z79.01]  History of pulmonary embolism [Z86.711]             Comments:  --              Anticoagulation Care Providers       Provider Role Specialty Phone number    Promise Vanegas MD Referring Family Medicine [phone]    Donald Rooney MD Referring Internal Medicine [phone]    Jose Maria Morin MD Referring Family Medicine [phone]

## 2025-03-26 NOTE — ASSESSMENT & PLAN NOTE
This patient is 5-1/2 weeks into therapy with semaglutide for weight (with history of stroke).  She had nausea when she went up to the 0.5 mg/week dosing.  We discussed nutrition.  I have asked her to try to eliminate sugar sweetened beverages.  This will be a challenge but she is willing to try.  We will continue titration.  Zofran prescribed for nausea.  I would like to see her back in approximately 4 to 6 weeks to review progress.  The 1 mg/week dose was sent to her pharmacy today.

## 2025-03-26 NOTE — PROGRESS NOTES
I never have prescribed senna or melatonin for pt.  Per med history, melatonin 3 mg q hs last prescribed by Dr. Emerita Oneill in February 2016. No record of 6 mg dose.    Plan  RN to call patient and let patient know:  - I will give 1 month Rx for senna. Further refills need to go to PCP.  -I will prescribe only 3 mg melatonin (not 6 mg).   Pt. is calling to schedule for 10 year colonoscopy follow up surveillance. Pt. denies any GI complaints. Pt. denies taking any blood thinners.  Pt. Has been scheduled at [facility] with Dr. Doll on 7/10/2025. Advised Pt. To hold mounjaro 7 days prior to procedure. MA will be sending the prepping medication to the Pharmacy. Prep Instructions for colonoscopy mailed and emailed. Holding Diabetic/Weight Loss Medication Prior to GI Procedure mailed and emailed. Advised Pt. that they will be sedated for the procedure and will need a responsible  over the age of 18 years old, May need to stay for the duration of the procedure. Pt. acknowledged.

## 2025-03-27 ENCOUNTER — MEDICAL CORRESPONDENCE (OUTPATIENT)
Dept: HEALTH INFORMATION MANAGEMENT | Facility: CLINIC | Age: 65
End: 2025-03-27
Payer: COMMERCIAL

## 2025-03-27 RX ORDER — ONDANSETRON 4 MG/1
4 TABLET, ORALLY DISINTEGRATING ORAL EVERY 8 HOURS PRN
Qty: 30 TABLET | Refills: 1 | Status: SHIPPED | OUTPATIENT
Start: 2025-03-27

## 2025-03-27 RX ORDER — SEMAGLUTIDE 1 MG/.5ML
1 INJECTION, SOLUTION SUBCUTANEOUS WEEKLY
Qty: 2 ML | Refills: 0 | Status: SHIPPED | OUTPATIENT
Start: 2025-03-27

## 2025-03-27 NOTE — PROGRESS NOTES
Call to [facility].. [facility] location closed 3/25, all scripts to go to [facility] on [facility] now. Resending zofran and wegovy rx to [facility] location.

## 2025-04-01 ENCOUNTER — ANTICOAGULATION THERAPY VISIT (OUTPATIENT)
Dept: ANTICOAGULATION | Facility: CLINIC | Age: 65
End: 2025-04-01
Payer: COMMERCIAL

## 2025-04-01 ENCOUNTER — TELEPHONE (OUTPATIENT)
Dept: FAMILY MEDICINE | Facility: CLINIC | Age: 65
End: 2025-04-01
Payer: COMMERCIAL

## 2025-04-01 DIAGNOSIS — Z79.01 LONG TERM (CURRENT) USE OF ANTICOAGULANTS: ICD-10-CM

## 2025-04-01 DIAGNOSIS — I26.99 PULMONARY EMBOLISM WITH INFARCTION (H): Primary | ICD-10-CM

## 2025-04-01 DIAGNOSIS — Z86.711 HISTORY OF PULMONARY EMBOLISM: ICD-10-CM

## 2025-04-01 LAB — INR (EXTERNAL): 2.1 (ref 0.9–1.1)

## 2025-04-01 NOTE — TELEPHONE ENCOUNTER
FYI - Status Update    Who is Calling: NurseRoselyn at [facility] care    Update: Wants Dr. Starr to know, she did a 2 month re certification for home care services for Valentina    Does caller want a call/response back: No

## 2025-04-01 NOTE — PROGRESS NOTES
ANTICOAGULATION MANAGEMENT     Valentina Olmedo 65 year old female is on warfarin with therapeutic INR result. (Goal INR 2.0-3.0)    Recent labs: (last 7 days)     04/01/25  1001   INR 2.1*       ASSESSMENT     Source(s): Chart Review, Patient/Caregiver Call, and Home Care/Facility Nurse     Warfarin doses taken: Warfarin taken as instructed  Diet: No new diet changes identified  Medication/supplement changes: None noted  New illness, injury, or hospitalization: No  Signs or symptoms of bleeding or clotting: No  Previous result: Subtherapeutic  Additional findings: None       PLAN     Recommended plan for no diet, medication or health factor changes affecting INR     Dosing Instructions: Continue your current warfarin dose with next INR in 1 week       Summary  As of 4/1/2025      Full warfarin instructions:  10 mg every Mon, Wed, Sat; 7.5 mg all other days   Next INR check:  4/8/2025               Telephone call with [facility] home care nurse who agrees to plan and repeated back plan correctly    Orders given to  Homecare nurse/facility to recheck    Education provided: Please call back if any changes to your diet, medications or how you've been taking warfarin    Plan made per [facility] anticoagulation protocol    Emerita Ca RN  4/1/2025  Anticoagulation Clinic  [employer] for routing messages: raquel MATHUR  [facility] patient phone line: [phone]        _______________________________________________________________________     Anticoagulation Episode Summary       Current INR goal:  2.0-3.0   TTR:  50.5% (1 y)   Target end date:  Indefinite   Send INR reminders to:  PIPO MATHUR    Indications    History of pulmonary embolism (Resolved) [Z86.711]  Pulmonary embolism with infarction (H) [I26.99]  Long term (current) use of anticoagulants [Z79.01]  History of pulmonary embolism [Z86.711]             Comments:  --             Anticoagulation Care Providers       Provider Role Specialty Phone number    Guilherme  Promise Byrd MD Referring Family Medicine [phone]    Donald Rooney MD Referring Internal Medicine [phone]    Jose Maria Morin MD Referring Family Medicine [phone]

## 2025-04-04 LAB — INR (EXTERNAL): 1.4

## 2025-04-07 ENCOUNTER — PATIENT OUTREACH (OUTPATIENT)
Dept: CARE COORDINATION | Facility: CLINIC | Age: 65
End: 2025-04-07
Payer: COMMERCIAL

## 2025-04-07 ENCOUNTER — MEDICAL CORRESPONDENCE (OUTPATIENT)
Dept: HEALTH INFORMATION MANAGEMENT | Facility: CLINIC | Age: 65
End: 2025-04-07
Payer: COMMERCIAL

## 2025-04-07 PROBLEM — F41.0 PANIC ATTACK: Status: ACTIVE | Noted: 2025-04-07

## 2025-04-08 ENCOUNTER — ANTICOAGULATION THERAPY VISIT (OUTPATIENT)
Dept: ANTICOAGULATION | Facility: CLINIC | Age: 65
End: 2025-04-08
Payer: COMMERCIAL

## 2025-04-08 DIAGNOSIS — Z79.01 LONG TERM (CURRENT) USE OF ANTICOAGULANTS: ICD-10-CM

## 2025-04-08 DIAGNOSIS — I26.99 PULMONARY EMBOLISM WITH INFARCTION (H): Primary | ICD-10-CM

## 2025-04-08 DIAGNOSIS — Z86.711 HISTORY OF PULMONARY EMBOLISM: ICD-10-CM

## 2025-04-08 LAB — INR (EXTERNAL): 1.4

## 2025-04-08 NOTE — PROGRESS NOTES
ANTICOAGULATION MANAGEMENT     Valentina Olmedo 65 year old female is on warfarin with subtherapeutic INR result. (Goal INR 2.0-3.0)    Recent labs: (last 7 days)     04/08/25  0000   INR 1.4       ASSESSMENT     Source(s): Chart Review and Home Care/Facility Nurse     Warfarin doses taken: Warfarin taken as instructed  Diet:  Poor appetite since ER visit. Premier protein 2-3 per day for the past week; plans to continue.  Medication/supplement changes:  Ativan 0.5 mg PRN prescribed on 4/4; no interactions anticipated  New illness, injury, or hospitalization: Yes: 4/4 ED visit for SOB/anxiety. Patient has a history of a pulmonary embolism. Work up negative. Per home care nurse, symptoms have resolved since.   Signs or symptoms of bleeding or clotting: No  Previous result: Subtherapeutic  Additional findings:  [facility] consult. Patient has not bridged in the past. Plan for closer monitoring; home care able to see patient on 4/10.       PLAN     Recommended plan for temporary change(s) and ongoing change(s) affecting INR     Dosing Instructions: booster dose then Increase your warfarin dose (12.5% change) with next INR in 2 days       Summary  As of 4/8/2025      Full warfarin instructions:  4/8: 15 mg; Otherwise 7.5 mg every Mon; 10 mg all other days   Next INR check:  4/10/2025               Telephone call with [place] home care nurse who verbalizes understanding and agrees to plan    Orders given to  Homecare nurse/facility to recheck    Education provided: Dietary considerations: Impact of protein intake on INR   Symptom monitoring: monitoring for clotting signs and symptoms  Contact [phone] with any changes, questions or concerns.     Plan made with [facility] Pharmacist Paula Mccrary, RN  4/8/2025  Anticoagulation Clinic  [employer] for routing messages: raquel MATHUR  [facility] patient phone line: [phone]        _______________________________________________________________________      Anticoagulation Episode Summary       Current INR goal:  2.0-3.0   TTR:  50.6% (1 y)   Target end date:  Indefinite   Send INR reminders to:  PIPO MATHUR    Indications    History of pulmonary embolism (Resolved) [Z86.711]  Pulmonary embolism with infarction (H) [I26.99]  Long term (current) use of anticoagulants [Z79.01]  History of pulmonary embolism [Z86.711]             Comments:  --             Anticoagulation Care Providers       Provider Role Specialty Phone number    Promise Vanegas MD Referring Family Medicine [phone]    Donald Rooney MD Referring Internal Medicine [phone]    Jose Maria Morin MD Referring Family Medicine [phone]

## 2025-04-10 ENCOUNTER — TELEPHONE (OUTPATIENT)
Dept: FAMILY MEDICINE | Facility: CLINIC | Age: 65
End: 2025-04-10
Payer: COMMERCIAL

## 2025-04-10 ENCOUNTER — ANTICOAGULATION THERAPY VISIT (OUTPATIENT)
Dept: ANTICOAGULATION | Facility: CLINIC | Age: 65
End: 2025-04-10
Payer: COMMERCIAL

## 2025-04-10 DIAGNOSIS — I26.99 PULMONARY EMBOLISM WITH INFARCTION (H): Primary | ICD-10-CM

## 2025-04-10 DIAGNOSIS — Z79.01 LONG TERM (CURRENT) USE OF ANTICOAGULANTS: ICD-10-CM

## 2025-04-10 DIAGNOSIS — Z86.711 HISTORY OF PULMONARY EMBOLISM: ICD-10-CM

## 2025-04-10 LAB — INR (EXTERNAL): 1.5

## 2025-04-10 NOTE — PROGRESS NOTES
ANTICOAGULATION MANAGEMENT     Valentina Olmedo 65 year old female is on warfarin with subtherapeutic INR result. (Goal INR 2.0-3.0)    Recent labs: (last 7 days)     04/10/25  1100   INR 1.5       ASSESSMENT     Source(s): Chart Review and Home Care/Facility Nurse     Warfarin doses taken: Warfarin taken as instructed  Diet:  continues on 2-3 supplement drinks a day   Medication/supplement changes: None noted  New illness, injury, or hospitalization: No  Signs or symptoms of bleeding or clotting: No  Previous result: Subtherapeutic  Additional findings: None       PLAN     Recommended plan for ongoing change(s) affecting INR     Dosing Instructions: Increase your warfarin dose (11% change) with next INR in 5 days       Summary  As of 4/10/2025      Full warfarin instructions:  12.5 mg every Mon, Thu; 10 mg all other days   Next INR check:  4/15/2025               Telephone call with [facility] care nurse who verbalizes understanding and agrees to plan    Orders given to  Homecare nurse/facility to recheck    Education provided: Goal range and lab monitoring: goal range and significance of current result  Dietary considerations: Impact of protein intake on INR   Contact [phone] with any changes, questions or concerns.     Plan made per [facility] anticoagulation protocol    Shaniqua Lenz RN  4/10/2025  Anticoagulation Clinic  [employer] for routing messages: raquel MATHUR  [facility] patient phone line: [phone]        _______________________________________________________________________     Anticoagulation Episode Summary       Current INR goal:  2.0-3.0   TTR:  50.6% (1 y)   Target end date:  Indefinite   Send INR reminders to:  PIPO MATHUR    Indications    History of pulmonary embolism (Resolved) [Z86.711]  Pulmonary embolism with infarction (H) [I26.99]  Long term (current) use of anticoagulants [Z79.01]  History of pulmonary embolism [Z86.711]             Comments:  --             Anticoagulation Care  Providers       Provider Role Specialty Phone number    Promise Vanegas MD Referring Family Medicine [phone]    Donald Rooney MD Referring Internal Medicine [phone]    Jose Maria Morin MD Referring Family Medicine [phone]

## 2025-04-10 NOTE — TELEPHONE ENCOUNTER
Left message to call back for: Patient   Information to relay to patient: Patient is scheduled 4/11 with Corinne Lebron for hospital follow-up. Please offer reschedule to PCP ([facility]) on 4/14 at 11:00am (40-min slot held).

## 2025-04-11 ENCOUNTER — MEDICAL CORRESPONDENCE (OUTPATIENT)
Dept: HEALTH INFORMATION MANAGEMENT | Facility: CLINIC | Age: 65
End: 2025-04-11

## 2025-04-14 ENCOUNTER — TELEPHONE (OUTPATIENT)
Dept: FAMILY MEDICINE | Facility: CLINIC | Age: 65
End: 2025-04-14

## 2025-04-14 ENCOUNTER — OFFICE VISIT (OUTPATIENT)
Dept: FAMILY MEDICINE | Facility: CLINIC | Age: 65
End: 2025-04-14
Payer: COMMERCIAL

## 2025-04-14 VITALS
BODY MASS INDEX: 47.09 KG/M2 | HEIGHT: 66 IN | HEART RATE: 96 BPM | DIASTOLIC BLOOD PRESSURE: 83 MMHG | WEIGHT: 293 LBS | TEMPERATURE: 97.8 F | SYSTOLIC BLOOD PRESSURE: 123 MMHG | OXYGEN SATURATION: 95 % | RESPIRATION RATE: 16 BRPM

## 2025-04-14 DIAGNOSIS — J45.30 MILD PERSISTENT ASTHMA WITHOUT COMPLICATION: ICD-10-CM

## 2025-04-14 DIAGNOSIS — Z86.73 HISTORY OF STROKE: ICD-10-CM

## 2025-04-14 DIAGNOSIS — F41.0 PANIC ATTACK: Primary | ICD-10-CM

## 2025-04-14 DIAGNOSIS — E66.813 CLASS 3 SEVERE OBESITY DUE TO EXCESS CALORIES WITH SERIOUS COMORBIDITY AND BODY MASS INDEX (BMI) OF 50.0 TO 59.9 IN ADULT: ICD-10-CM

## 2025-04-14 DIAGNOSIS — Z86.73 HISTORY OF STROKE: Primary | ICD-10-CM

## 2025-04-14 DIAGNOSIS — K21.00 GASTROESOPHAGEAL REFLUX DISEASE WITH ESOPHAGITIS WITHOUT HEMORRHAGE: ICD-10-CM

## 2025-04-14 DIAGNOSIS — R11.0 NAUSEA: ICD-10-CM

## 2025-04-14 DIAGNOSIS — J30.2 SEASONAL ALLERGIC RHINITIS, UNSPECIFIED TRIGGER: ICD-10-CM

## 2025-04-14 DIAGNOSIS — F17.210 CIGARETTE NICOTINE DEPENDENCE: ICD-10-CM

## 2025-04-14 DIAGNOSIS — J44.9 CHRONIC OBSTRUCTIVE PULMONARY DISEASE, UNSPECIFIED COPD TYPE (H): ICD-10-CM

## 2025-04-14 PROCEDURE — G2211 COMPLEX E/M VISIT ADD ON: HCPCS | Performed by: FAMILY MEDICINE

## 2025-04-14 PROCEDURE — 99214 OFFICE O/P EST MOD 30 MIN: CPT | Performed by: FAMILY MEDICINE

## 2025-04-14 PROCEDURE — 3079F DIAST BP 80-89 MM HG: CPT | Performed by: FAMILY MEDICINE

## 2025-04-14 PROCEDURE — 3074F SYST BP LT 130 MM HG: CPT | Performed by: FAMILY MEDICINE

## 2025-04-14 RX ORDER — LORAZEPAM 0.5 MG/1
1 TABLET ORAL EVERY 6 HOURS PRN
COMMUNITY
Start: 2025-04-04 | End: 2025-04-14

## 2025-04-14 RX ORDER — PREDNISONE 20 MG/1
40 TABLET ORAL DAILY
Qty: 10 TABLET | Refills: 0 | Status: SHIPPED | OUTPATIENT
Start: 2025-04-14 | End: 2025-04-19

## 2025-04-14 RX ORDER — VARENICLINE TARTRATE 1 MG/1
1 TABLET, FILM COATED ORAL 2 TIMES DAILY
Qty: 60 TABLET | Refills: 8 | Status: SHIPPED | OUTPATIENT
Start: 2025-04-14

## 2025-04-14 RX ORDER — BUDESONIDE AND FORMOTEROL FUMARATE DIHYDRATE 160; 4.5 UG/1; UG/1
2 AEROSOL RESPIRATORY (INHALATION) 2 TIMES DAILY
Qty: 10.2 G | Refills: 8 | Status: SHIPPED | OUTPATIENT
Start: 2025-04-14 | End: 2025-04-14

## 2025-04-14 RX ORDER — LORAZEPAM 0.5 MG/1
0.5 TABLET ORAL EVERY 6 HOURS PRN
Qty: 15 TABLET | Refills: 0 | Status: SHIPPED | OUTPATIENT
Start: 2025-04-14

## 2025-04-14 RX ORDER — ALBUTEROL SULFATE 90 UG/1
2 INHALANT RESPIRATORY (INHALATION) EVERY 4 HOURS PRN
Qty: 18 G | Refills: 5 | Status: SHIPPED | OUTPATIENT
Start: 2025-04-14

## 2025-04-14 RX ORDER — BUDESONIDE AND FORMOTEROL FUMARATE DIHYDRATE 160; 4.5 UG/1; UG/1
2 AEROSOL RESPIRATORY (INHALATION) 2 TIMES DAILY
Qty: 10.2 G | Refills: 8 | Status: SHIPPED | OUTPATIENT
Start: 2025-04-14

## 2025-04-14 RX ORDER — ESOMEPRAZOLE MAGNESIUM 40 MG/1
40 CAPSULE, DELAYED RELEASE ORAL
Qty: 30 CAPSULE | Refills: 11 | Status: SHIPPED | OUTPATIENT
Start: 2025-04-14

## 2025-04-14 RX ORDER — ONDANSETRON 4 MG/1
4 TABLET, ORALLY DISINTEGRATING ORAL EVERY 8 HOURS PRN
Qty: 30 TABLET | Refills: 1 | Status: SHIPPED | OUTPATIENT
Start: 2025-04-14

## 2025-04-14 NOTE — ASSESSMENT & PLAN NOTE
The patient describes symptoms of panic.  Indeed she was seen in the emergency department and underwent a cardiopulmonary workup on 4/4.  She has somewhat pressured speech today on exam and appears to be struggling.  Her lung exam and vital signs are normal.  I suspect that her symptoms are actually a result of asthma.  However given the overall picture, I did extend the trial of lorazepam.  Refill sent to pharmacy (initially started by the emergency department).  We will reach out to the patient's psychiatrist and make sure that she knows that this patient is struggling.

## 2025-04-14 NOTE — ASSESSMENT & PLAN NOTE
Questionable initial response to Wegovy.  As she went up to 1 mg she has had profound nausea.  Asthma symptoms (or panic symptoms concurrently).  Unclear if these are related to being on Wegovy.  We will decrease her dose to 1 mg.  Unclear if she would get insurance coverage but if she does, it might make sense to maintain her on 0.5 mg until we are confident that she is tolerating it and then titrate.  TBD.

## 2025-04-14 NOTE — ASSESSMENT & PLAN NOTE
Asthma versus COPD.  The patient certainly could be dealing with an asthma exacerbation.  She feels considerably better after albuterol inhalers.  Refill of albuterol sent to pharmacy.  She is currently transitioning from Advair to Symbicort following an insurance change.  This is exacerbating her underlying anxiety as discussed elsewhere.  Prednisone burst prescribed.  I considered adding azithromycin but given that there are no bronchitis symptoms will defer.  Will reach out to the patient later this week to ensure that she is improving with this therapy.

## 2025-04-14 NOTE — PROGRESS NOTES
Assessment & Plan   Problem List Items Addressed This Visit       Chronic obstructive pulmonary disease (H)     Asthma versus COPD.  The patient certainly could be dealing with an asthma exacerbation.  She feels considerably better after albuterol inhalers.  Refill of albuterol sent to pharmacy.  She is currently transitioning from Advair to Symbicort following an insurance change.  This is exacerbating her underlying anxiety as discussed elsewhere.  Prednisone burst prescribed.  I considered adding azithromycin but given that there are no bronchitis symptoms will defer.  Will reach out to the patient later this week to ensure that she is improving with this therapy.         Relevant Medications    budesonide-formoterol (SYMBICORT/BREYNA) 160-4.5 MCG/ACT Inhaler    predniSONE (DELTASONE) 20 MG tablet    albuterol (PROAIR HFA) 108 (90 Base) MCG/ACT inhaler    Class 3 severe obesity due to excess calories with serious comorbidity and body mass index (BMI) of 50.0 to 59.9 in adult (H)     Questionable initial response to Wegovy.  As she went up to 1 mg she has had profound nausea.  Asthma symptoms (or panic symptoms concurrently).  Unclear if these are related to being on Wegovy.  We will decrease her dose to 1 mg.  Unclear if she would get insurance coverage but if she does, it might make sense to maintain her on 0.5 mg until we are confident that she is tolerating it and then titrate.  TBD.         Relevant Medications    Semaglutide-Weight Management (WEGOVY) 0.5 MG/0.5ML pen    Esophageal reflux    Relevant Medications    esomeprazole (NEXIUM) 40 MG DR capsule    ondansetron (ZOFRAN ODT) 4 MG ODT tab    History of stroke    Relevant Medications    Semaglutide-Weight Management (WEGOVY) 0.5 MG/0.5ML pen    Panic attack - Primary     The patient describes symptoms of panic.  Indeed she was seen in the emergency department and underwent a cardiopulmonary workup on 4/4.  She has somewhat pressured speech today on  exam and appears to be struggling.  Her lung exam and vital signs are normal.  I suspect that her symptoms are actually a result of asthma.  However given the overall picture, I did extend the trial of lorazepam.  Refill sent to pharmacy (initially started by the emergency department).  We will reach out to the patient's psychiatrist and make sure that she knows that this patient is struggling.         Relevant Medications    LORazepam (ATIVAN) 0.5 MG tablet     Other Visit Diagnoses       Mild persistent asthma without complication        Relevant Medications    budesonide-formoterol (SYMBICORT/BREYNA) 160-4.5 MCG/ACT Inhaler    predniSONE (DELTASONE) 20 MG tablet    albuterol (PROAIR HFA) 108 (90 Base) MCG/ACT inhaler    Nausea        Relevant Medications    ondansetron (ZOFRAN ODT) 4 MG ODT tab            MED REC REQUIRED  Post Medication Reconciliation Status:  Patient was not discharged from an inpatient facility or TCU    The longitudinal plan of care for the diagnosis(es)/condition(s) as documented were addressed during this visit. Due to the added complexity in care, I will continue to support Valentina in the subsequent management and with ongoing continuity of care.      Magda Montgomery is a 65 year old, presenting for the following health issues:  ER F/U (ER follow-up on 4/4/25  [facility])        4/14/2025    10:59 AM   Additional Questions   Roomed by xl   Accompanied by      Breathing:   - throat tight.  This can precipitate a panic attack.   -Nausea severe since going up to 1 mg/week of Wegovy   - she has prescription for symbicort.  She istaking advair currently as she has residual supply.  This change is because of formulary changes with her insurance.  - Albuterol helps  - She is on lorazepam as described by the emergency department.  This also helps with her symptoms.              Objective    /83 (BP Location: Left arm, Patient Position: Sitting, Cuff Size: Adult Large)   Pulse 96  "  Temp 97.8  F (36.6  C) (Oral)   Resp 16   Ht 1.676 m (5' 6\")   Wt (!) 157.1 kg (346 lb 4.8 oz)   LMP  (LMP Unknown)   SpO2 95%   BMI 55.89 kg/m    Body mass index is 55.89 kg/m .  Physical Exam  Nursing note reviewed.   Constitutional:       General: She is not in acute distress.     Appearance: Normal appearance. She is not ill-appearing.   HENT:      Head: Normocephalic and atraumatic.      Right Ear: External ear normal.      Left Ear: External ear normal.   Eyes:      General: No scleral icterus.     Extraocular Movements: Extraocular movements intact.      Conjunctiva/sclera: Conjunctivae normal.   Cardiovascular:      Rate and Rhythm: Normal rate and regular rhythm.      Heart sounds: Normal heart sounds. No murmur heard.     No friction rub. No gallop.   Pulmonary:      Effort: Pulmonary effort is normal. No respiratory distress.      Breath sounds: Normal breath sounds. No wheezing or rales.   Musculoskeletal:         General: No swelling. Normal range of motion.   Skin:     General: Skin is warm.      Coloration: Skin is not jaundiced.      Findings: No rash.   Neurological:      General: No focal deficit present.      Mental Status: She is alert and oriented to person, place, and time. Mental status is at baseline.   Psychiatric:         Attention and Perception: Attention normal.         Speech: Speech normal.      Comments: She appears anxious and fearful.  Rate of speech increased today in comparison to past visits.                      Signed Electronically by: Jose Maria Morin MD    "

## 2025-04-15 ENCOUNTER — ANTICOAGULATION THERAPY VISIT (OUTPATIENT)
Dept: ANTICOAGULATION | Facility: CLINIC | Age: 65
End: 2025-04-15
Payer: COMMERCIAL

## 2025-04-15 DIAGNOSIS — Z79.01 LONG TERM (CURRENT) USE OF ANTICOAGULANTS: ICD-10-CM

## 2025-04-15 DIAGNOSIS — Z86.711 HISTORY OF PULMONARY EMBOLISM: ICD-10-CM

## 2025-04-15 DIAGNOSIS — I26.99 PULMONARY EMBOLISM WITH INFARCTION (H): Primary | ICD-10-CM

## 2025-04-15 LAB — INR (EXTERNAL): 1.8 (ref 0.9–1.1)

## 2025-04-15 NOTE — PROGRESS NOTES
ANTICOAGULATION MANAGEMENT     Valentina Olmedo 65 year old female is on warfarin with subtherapeutic INR result. (Goal INR 2.0-3.0)    Recent labs: (last 7 days)     04/15/25  0929   INR 1.8*       ASSESSMENT     Source(s): Chart Review, Patient/Caregiver Call, and Home Care/Facility Nurse     Warfarin doses taken: Warfarin taken as instructed  Diet:  patient ran out of her supplement drink but will get more but decrease to one per day.  Medication/supplement changes: None noted  New illness, injury, or hospitalization: No  Signs or symptoms of bleeding or clotting: No  Previous result: Subtherapeutic  Additional findings: None  No adjustment to maintenance dose since patient plans to decrease the amount of protein drinks to 1 per day.       PLAN     Recommended plan for temporary change(s) affecting INR     Dosing Instructions: Continue your current warfarin dose with next INR in 1 week       Summary  As of 4/15/2025      Full warfarin instructions:  12.5 mg every Mon, Thu; 10 mg all other days   Next INR check:  4/22/2025               Telephone call with [facility] care nurse who verbalizes understanding and agrees to plan    Orders given to  Homecare nurse/facility to recheck    Education provided: Dietary considerations: Impact of protein intake on INR     Plan made per [facility] anticoagulation protocol    Emerita Ca RN  4/15/2025  Anticoagulation Clinic  [employer] for routing messages: raquel MATHUR  [facility] patient phone line: [phone]        _______________________________________________________________________     Anticoagulation Episode Summary       Current INR goal:  2.0-3.0   TTR:  50.6% (1 y)   Target end date:  Indefinite   Send INR reminders to:  PIPO MATHUR    Indications    History of pulmonary embolism (Resolved) [Z86.711]  Pulmonary embolism with infarction (H) [I26.99]  Long term (current) use of anticoagulants [Z79.01]  History of pulmonary embolism [Z86.711]             Comments:   --             Anticoagulation Care Providers       Provider Role Specialty Phone number    Promise Vanegas MD Referring Family Medicine [phone]    Donald Rooney MD Referring Internal Medicine [phone]    Jose Maria Morin MD Referring Family Medicine [phone]

## 2025-04-18 ENCOUNTER — MEDICAL CORRESPONDENCE (OUTPATIENT)
Dept: HEALTH INFORMATION MANAGEMENT | Facility: CLINIC | Age: 65
End: 2025-04-18
Payer: COMMERCIAL

## 2025-04-22 ENCOUNTER — TELEPHONE (OUTPATIENT)
Dept: FAMILY MEDICINE | Facility: CLINIC | Age: 65
End: 2025-04-22

## 2025-04-22 ENCOUNTER — TELEPHONE (OUTPATIENT)
Dept: ANTICOAGULATION | Facility: CLINIC | Age: 65
End: 2025-04-22
Payer: COMMERCIAL

## 2025-04-22 ENCOUNTER — HOSPITAL ENCOUNTER (EMERGENCY)
Facility: HOSPITAL | Age: 65
Discharge: HOME | End: 2025-04-22
Attending: EMERGENCY MEDICINE | Admitting: EMERGENCY MEDICINE
Payer: COMMERCIAL

## 2025-04-22 ENCOUNTER — TELEPHONE (OUTPATIENT)
Dept: ANTICOAGULATION | Facility: CLINIC | Age: 65
End: 2025-04-22

## 2025-04-22 ENCOUNTER — APPOINTMENT (OUTPATIENT)
Dept: RADIOLOGY | Facility: HOSPITAL | Age: 65
End: 2025-04-22
Payer: COMMERCIAL

## 2025-04-22 VITALS
SYSTOLIC BLOOD PRESSURE: 162 MMHG | DIASTOLIC BLOOD PRESSURE: 78 MMHG | HEART RATE: 73 BPM | RESPIRATION RATE: 18 BRPM | TEMPERATURE: 98 F | OXYGEN SATURATION: 96 %

## 2025-04-22 DIAGNOSIS — J44.0 CHRONIC OBSTRUCTIVE PULMONARY DISEASE WITH ACUTE LOWER RESPIRATORY INFECTION (H): ICD-10-CM

## 2025-04-22 DIAGNOSIS — Z86.711 HISTORY OF PULMONARY EMBOLISM: ICD-10-CM

## 2025-04-22 DIAGNOSIS — Z79.01 LONG TERM (CURRENT) USE OF ANTICOAGULANTS: ICD-10-CM

## 2025-04-22 DIAGNOSIS — I26.99 PULMONARY EMBOLISM WITH INFARCTION (H): Primary | ICD-10-CM

## 2025-04-22 DIAGNOSIS — J30.2 SEASONAL ALLERGIC RHINITIS, UNSPECIFIED TRIGGER: ICD-10-CM

## 2025-04-22 LAB
ANION GAP SERPL CALCULATED.3IONS-SCNC: 8 MMOL/L (ref 7–15)
BASOPHILS # BLD AUTO: 0 10E3/UL (ref 0–0.2)
BASOPHILS NFR BLD AUTO: 1 %
BUN SERPL-MCNC: 9 MG/DL (ref 8–23)
CALCIUM SERPL-MCNC: 8.1 MG/DL (ref 8.8–10.4)
CHLORIDE SERPL-SCNC: 111 MMOL/L (ref 98–107)
CREAT SERPL-MCNC: 0.71 MG/DL (ref 0.51–0.95)
EGFRCR SERPLBLD CKD-EPI 2021: >90 ML/MIN/1.73M2
EOSINOPHIL # BLD AUTO: 0.1 10E3/UL (ref 0–0.7)
EOSINOPHIL NFR BLD AUTO: 2 %
ERYTHROCYTE [DISTWIDTH] IN BLOOD BY AUTOMATED COUNT: 13.5 % (ref 10–15)
FLUAV RNA SPEC QL NAA+PROBE: NEGATIVE
FLUBV RNA RESP QL NAA+PROBE: NEGATIVE
GLUCOSE SERPL-MCNC: 97 MG/DL (ref 70–99)
HCO3 SERPL-SCNC: 27 MMOL/L (ref 22–29)
HCT VFR BLD AUTO: 40.1 % (ref 35–47)
HGB BLD-MCNC: 13.2 G/DL (ref 11.7–15.7)
IMM GRANULOCYTES # BLD: 0 10E3/UL
IMM GRANULOCYTES NFR BLD: 0 %
LYMPHOCYTES # BLD AUTO: 1.2 10E3/UL (ref 0.8–5.3)
LYMPHOCYTES NFR BLD AUTO: 21 %
MCH RBC QN AUTO: 27.8 PG (ref 26.5–33)
MCHC RBC AUTO-ENTMCNC: 32.9 G/DL (ref 31.5–36.5)
MCV RBC AUTO: 84 FL (ref 78–100)
MONOCYTES # BLD AUTO: 0.4 10E3/UL (ref 0–1.3)
MONOCYTES NFR BLD AUTO: 7 %
NEUTROPHILS # BLD AUTO: 3.9 10E3/UL (ref 1.6–8.3)
NEUTROPHILS NFR BLD AUTO: 70 %
NRBC # BLD AUTO: 0 10E3/UL
NRBC BLD AUTO-RTO: 0 /100
NT-PROBNP SERPL-MCNC: 95 PG/ML (ref 0–900)
PLATELET # BLD AUTO: 217 10E3/UL (ref 150–450)
POTASSIUM SERPL-SCNC: 3.6 MMOL/L (ref 3.4–5.3)
RBC # BLD AUTO: 4.75 10E6/UL (ref 3.8–5.2)
RSV RNA SPEC NAA+PROBE: NEGATIVE
SARS-COV-2 RNA RESP QL NAA+PROBE: NEGATIVE
SODIUM SERPL-SCNC: 146 MMOL/L (ref 135–145)
TROPONIN T SERPL HS-MCNC: 10 NG/L
TROPONIN T SERPL HS-MCNC: 10 NG/L
WBC # BLD AUTO: 5.5 10E3/UL (ref 4–11)

## 2025-04-22 PROCEDURE — 83880 ASSAY OF NATRIURETIC PEPTIDE: CPT

## 2025-04-22 PROCEDURE — 93005 ELECTROCARDIOGRAM TRACING: CPT | Performed by: EMERGENCY MEDICINE

## 2025-04-22 PROCEDURE — 82310 ASSAY OF CALCIUM: CPT

## 2025-04-22 PROCEDURE — 84484 ASSAY OF TROPONIN QUANT: CPT

## 2025-04-22 PROCEDURE — 80048 BASIC METABOLIC PNL TOTAL CA: CPT

## 2025-04-22 PROCEDURE — 99285 EMERGENCY DEPT VISIT HI MDM: CPT | Mod: 25

## 2025-04-22 PROCEDURE — 87637 SARSCOV2&INF A&B&RSV AMP PRB: CPT

## 2025-04-22 PROCEDURE — 85004 AUTOMATED DIFF WBC COUNT: CPT

## 2025-04-22 PROCEDURE — 71046 X-RAY EXAM CHEST 2 VIEWS: CPT

## 2025-04-22 PROCEDURE — 93005 ELECTROCARDIOGRAM TRACING: CPT | Performed by: STUDENT IN AN ORGANIZED HEALTH CARE EDUCATION/TRAINING PROGRAM

## 2025-04-22 PROCEDURE — 36415 COLL VENOUS BLD VENIPUNCTURE: CPT

## 2025-04-22 RX ORDER — PREDNISONE 10 MG/1
TABLET ORAL
Qty: 9 TABLET | Refills: 0 | Status: SHIPPED | OUTPATIENT
Start: 2025-04-22 | End: 2025-04-28

## 2025-04-22 RX ORDER — AZITHROMYCIN 250 MG/1
TABLET, FILM COATED ORAL
Qty: 6 TABLET | Refills: 0 | Status: SHIPPED | OUTPATIENT
Start: 2025-04-22 | End: 2025-04-27

## 2025-04-22 RX ORDER — FLUTICASONE PROPIONATE 50 MCG
SPRAY, SUSPENSION (ML) NASAL
Qty: 16 G | Refills: 2 | Status: SHIPPED | OUTPATIENT
Start: 2025-04-22

## 2025-04-22 ASSESSMENT — ACTIVITIES OF DAILY LIVING (ADL)
ADLS_ACUITY_SCORE: 41

## 2025-04-22 NOTE — TELEPHONE ENCOUNTER
Home Health Care    Reason for call:  Bettina Clinical Supervisor with [facility] calling to report Valentina was sent to the ER today for SOB and dx with COPD exacerbation.  She was not admitted, discharged home on azithromycin and a prednisone taper.       azithromycin (ZITHROMAX) 250 MG tablet  500 mg, DAILY Starting Tue 4/22/2025, For 1 day, THEN 250 mg, DAILY Starting Wed 4/23/2025, For 4 days     predniSONE (DELTASONE) 10 MG tablet  20 mg, DAILY Starting Tue 4/22/2025, For 3 days, THEN 10 mg, DAILY Starting Fri 4/25/2025, For 3 days       Needs verbal okay from Dr. Morin to add medications from ED discharge today, to current med list.  Please sign updated medication list to be faxed to [facility] at [phone]    Will do follow up SN visit this week as well.      Orders are needed for this patient.  Skilled Nursing: Needs verbal okay from Dr. Morin to add medications from ED discharge today (4/22/25), to current med list.  Also asking for a signed updated med list from Dr. Morin to be faxed to them at [phone].     Pt Provider: Dr. Morin     Phone Number Homecare Nurse can be reached at: [phone]          Paula Rollins RN  [facility]

## 2025-04-22 NOTE — DISCHARGE INSTRUCTIONS
Your emergency department evaluation today is reassuring.  I have sent a prescription for her antibiotic and the rest of your prednisone taper, please take these as instructed for your symptoms.  Please follow-up with your PCP for further evaluation and management of your symptoms.

## 2025-04-22 NOTE — TELEPHONE ENCOUNTER
ANTICOAGULATION  MANAGEMENT: Discharge Review    Valentina Olmedo chart reviewed for anticoagulation continuity of care    Emergency room visit on 4/22/25 for Shortness of breathe and wheezing.    Discharge disposition: Home with Home Care    Results: INR was not drawn in ED        Anticoagulation inpatient management:     not applicable     Anticoagulation discharge instructions:     Warfarin dosing: home regimen continued   Bridging: No   INR goal change: No      Medication changes affecting anticoagulation: Yes: Azithromycin started 4/22 and takes last dose 4/26. Azithromycin increases the risk for bleeding while taking warfarin per micromedex. Additional days were added to patients Prednisone taper. Prednisone can alter INR per micromedex.     Additional factors affecting anticoagulation: No     PLAN     Recommend to check INR on 4/25    Patient not contacted    Anticoagulation Calendar updated    Teja Sutherland RN  4/22/2025  Anticoagulation Clinic  [facility] for routing messages: raquel MATHUR  [facility] patient phone line: [phone]

## 2025-04-22 NOTE — ED TRIAGE NOTES
Presents from home via EMS with c/o sob since 0700. Pt also c/o cough and has concern for pneumonia. Some wheezing noted per EMS so duoneb given and wheezing cleared. Home nurse told her oxygen was OK. Medics found pt at 91-92% on RA and placed pt on 2 L NC. Pt has dry cough and is 96% on RA. Pt's end title per medics was 35-36. Pt c/o sternal cp with cough.

## 2025-04-22 NOTE — ED PROVIDER NOTES
Emergency Department Encounter   NAME: Valentina Olmedo ; AGE: 65 year old female ; YOB: 1960 ; MRN: [id number] ; PCP: Jose Maria Morin   ED PROVIDER: Ida Santiago PA-C    Evaluation Date & Time:   4/22/2025 10:29 AM    CHIEF COMPLAINT:  Shortness of Breath      Impression and Plan   MDM: Valentina Olmedo is a 65 year old female who presents to the ED for evaluation of SOB. Patient woke up today around 7 AM with shortness of breath and wheezing.  She has had a cough for about 2 days and was seen on 9/18 and diagnosed with COPD exacerbation. She was started on prednisone. She was given duoneb by EMS and started on supplemental oxygen but is currently satting well on room air. She also endorses sharp left sided chest pain that wraps around to her side and increases with coughing.     Vitals reviewed and patient is afebrile, not tachycardic or tachypneic, and satting well on room air. On exam patient has mild rales heard on auscultation of RLL. No wheezing. No BLE edema. Patient alert and oriented. Differential diagnosis/emergent conditions considered and evaluated for includes but not limited to ACS, COPD exacerbation, pneumonia, viral bronchitis.    Viral panel negative.  Troponin slightly elevated at 10 but stable on repeat.  EKG also reassuring, so low concern for cardiac cause of her chest pain and shortness of breath.  BNP 95, and patient does not normally have any peripheral edema that would suggest heart failure exacerbation as a cause of her symptoms.  No leukocytosis on CBC to suggest severe bacterial infection.  Chest x-ray normal with no signs of pneumonia.  BMP unremarkable for any electrolyte abnormalities.  No concern for pulmonary embolism today, as patient is not tachycardic, is satting well on room air, takes her warfarin daily and was in the therapeutic range for INR 2 days ago.  Plan to treat patient for COPD exacerbation with lower respiratory infection despite negative chest x-ray,  as her symptoms have not resolved with steroids and slight rales heard on auscultation. Will start her on azithromycin and add more days onto her prednisone taper as well. Patient verbalizes understanding of this plan.       ED COURSE:  10:50 AM I met and introduced myself to the patient. I gathered initial history and performed my physical exam. We discussed plan for initial workup.       11:57 AM I have staffed the patient with Dr. Sterling, ED MD, who has evaluated the patient and agrees with all aspects of today's care.   1:48 PM I rechecked the patient and discussed results, discharge, follow up, and reasons to return to the ED.     At the conclusion of the encounter I discussed the results of all the tests and the disposition. The questions were answered. The patient or family acknowledged understanding and was agreeable with the care plan.    Medical Decision Making  Discharge. I prescribed additional prescription strength medication(s) as charted. See documentation for any additional details.    MIPS (CTPE, Dental pain, Smith, Sinusitis, Asthma/COPD, Head Trauma): Not Applicable    SEPSIS: None        FINAL IMPRESSION:    ICD-10-CM    1. Chronic obstructive pulmonary disease with acute lower respiratory infection (H)  J44.0             MEDICATIONS GIVEN IN THE EMERGENCY DEPARTMENT:  Medications - No data to display      NEW PRESCRIPTIONS STARTED AT TODAY'S ED VISIT:  New Prescriptions    AZITHROMYCIN (ZITHROMAX) 250 MG TABLET    Take 2 tablets (500 mg) by mouth daily for 1 day, THEN 1 tablet (250 mg) daily for 4 days.    PREDNISONE (DELTASONE) 10 MG TABLET    Take 2 tablets (20 mg) by mouth daily for 3 days, THEN 1 tablet (10 mg) daily for 3 days.         HPI   Use of Intrepreter: N/A     Valentina Olmedo is a 65 year old female presenting via EMS with a pertinent history of COPD, HTN, obesity, PE on warfarin, nicotine use disorder, CHRIS who presents to the ED for evaluation of SOB.  Patient woke up today around  7 AM with shortness of breath and wheezing.  She has had a cough for about 2 days and was seen on 9/18 and diagnosed with COPD exacerbation. She was started on prednisone. She was given duoneb by EMS and started on supplemental oxygen but is currently satting well on room air. She also endorses sharp left sided chest pain that wraps around to her side and increases with coughing. Endorses chills occasionally. She has not noticed any BLE swelling, and she has been taking her warfarin daily. She denies any abdominal pain or GI symptoms.       REVIEW OF SYSTEMS:  Pertinent positive and negative symptoms per HPI.       Medical History     Past Medical History:   Diagnosis Date    Adrenal mass 10/12/2015    Age-related cataract of both eyes, unspecified age-related cataract type 03/27/2024    Anxiety     Anxiety     Arthritis     Borderline personality disorder (H)     Cancer (H)     COPD (chronic obstructive pulmonary disease) (H)     Depression     Depressive disorder     History of COVID-19     Hyperlipidemia     Hypertension     Hypertension     Hyponatremia     Hypothyroidism     Malignant neoplasm of thyroid gland (H)     Primary hyperparathyroidism     PTSD (post-traumatic stress disorder)     Pulmonary embolism with infarction (H) 01/12/2021    Recurrent otitis media     Seizure disorder (H)     Sleep apnea     Stroke (H)     Tobacco abuse     Vertigo        Past Surgical History:   Procedure Laterality Date    ABDOMEN SURGERY      ADRENALECTOMY Left     BRAIN SURGERY      COLONOSCOPY N/A 5/7/2024    Procedure: WITH COLONOSCOPY with polypectomies;  Surgeon: Walt Lira MD;  Location: [facility] OR    CRANIOTOMY FOR TEMPORAL LOBECTOMY Right     x3 for seizure    DILATION AND CURETTAGE      ESOPHAGOSCOPY, GASTROSCOPY, DUODENOSCOPY (EGD), COMBINED N/A 5/7/2024    Procedure: and biopsies;  Surgeon: Walt Lira MD;  Location: [facility] OR    ESOPHAGOSCOPY, GASTROSCOPY, DUODENOSCOPY (EGD), DILATATION,  COMBINED N/A 5/7/2024    Procedure: ESOPHAGOGASTRODUODENOSCOPY with esophageal dilation;  Surgeon: Walt Lira MD;  Location: [facility] OR    HEAD & NECK SURGERY      HYSTERECTOMY, PAP NO LONGER INDICATED N/A     LITHOTRIPSY      PARATHYROID GLAND SURGERY      resection of adenoma    REFRACTIVE SURGERY Bilateral     RELEASE CARPAL TUNNEL Bilateral     TONSILLECTOMY & ADENOIDECTOMY      TOTAL THYROIDECTOMY      TOTAL VAGINAL HYSTERECTOMY      39 years old. Benign       Family History   Problem Relation Age of Onset    Chronic Obstructive Pulmonary Disease Mother     Other - See Comments Father         estranged    Skin Cancer Father     Lung Cancer Maternal Grandfather 76    Other - See Comments Brother         Missing since 1992    Alcoholism Brother     Diabetes Sister     Depression Sister     Alcoholism Sister     No Known Problems Sister         Half sister       Social History     Tobacco Use    Smoking status: Former     Types: Cigarettes     Passive exposure: Never    Smokeless tobacco: Never    Tobacco comments:     Quit about 5 days ago per pt   Vaping Use    Vaping status: Never Used   Substance Use Topics    Alcohol use: No     Comment: None.    Drug use: No       azithromycin (ZITHROMAX) 250 MG tablet  predniSONE (DELTASONE) 10 MG tablet  albuterol (PROAIR HFA) 108 (90 Base) MCG/ACT inhaler  ARIPiprazole (ABILIFY) 5 MG tablet  Brivaracetam (BRIVIACT) 100 MG tablet  budesonide-formoterol (SYMBICORT/BREYNA) 160-4.5 MCG/ACT Inhaler  carBAMazepine (TEGRETOL) 200 MG tablet  denosumab (PROLIA) 60 MG/ML SOSY injection  diazepam (VALIUM) 5 MG tablet  DULoxetine (CYMBALTA) 60 MG capsule  EPINEPHrine (ANY BX GENERIC EQUIV) 0.3 MG/0.3ML injection 2-pack  esomeprazole (NEXIUM) 40 MG DR capsule  fluticasone (FLONASE) 50 MCG/ACT nasal spray  furosemide (LASIX) 20 MG tablet  GEMTESA 75 MG TABS tablet  levothyroxine (SYNTHROID/LEVOTHROID) 300 MCG tablet  LORazepam (ATIVAN) 0.5 MG tablet  melatonin 3 MG  tablet  multivitamin with iron (CHROMAGEN) TABS half-tab  nystatin (MYCOSTATIN) 137189 UNIT/GM external cream  ondansetron (ZOFRAN ODT) 4 MG ODT tab  polyethylene glycol (MIRALAX) 17 GM/Dose powder  polymixin b-trimethoprim (POLYTRIM) 87357-7.1 UNIT/ML-% ophthalmic solution  prazosin (MINIPRESS) 2 MG capsule  prednisoLONE acetate (PRED FORTE) 1 % ophthalmic suspension  pregabalin (LYRICA) 25 MG capsule  QUEtiapine Fumarate 150 MG TABS  rosuvastatin (CRESTOR) 20 MG tablet  Semaglutide-Weight Management (WEGOVY) 0.5 MG/0.5ML pen  Semaglutide-Weight Management (WEGOVY) 1 MG/0.5ML pen  SENNA-TIME 8.6 MG tablet  SUMAtriptan (IMITREX) 50 MG tablet  varenicline (CHANTIX) 1 MG tablet  vitamin D3 (CHOLECALCIFEROL) 50 mcg (2000 units) tablet  warfarin ANTICOAGULANT (COUMADIN) 5 MG tablet  zolpidem (AMBIEN) 5 MG tablet          Physical Exam     First Vitals:  Patient Vitals for the past 24 hrs:   BP Temp Temp src Pulse Resp SpO2   04/22/25 1331 (!) 151/77 -- -- 72 -- 98 %   04/22/25 1317 (!) 183/86 -- -- 76 -- 94 %   04/22/25 1302 (!) 180/86 -- -- 75 -- 97 %   04/22/25 1246 (!) 128/94 -- -- 76 -- 97 %   04/22/25 1232 (!) 154/93 -- -- 79 -- 96 %   04/22/25 1217 (!) 168/93 -- -- 82 -- 96 %   04/22/25 1159 (!) 160/90 -- -- 84 -- 95 %   04/22/25 1145 (!) 140/79 -- -- 83 -- 95 %   04/22/25 1128 -- -- -- 83 -- 95 %   04/22/25 1115 (!) 140/52 -- -- 75 -- 95 %   04/22/25 1101 (!) 149/67 -- -- 78 -- 94 %   04/22/25 1046 128/76 -- -- 78 -- 94 %   04/22/25 1035 127/78 -- -- 82 -- 97 %   04/22/25 1034 (!) 144/88 98.6  F (37  C) Oral 82 18 96 %         PHYSICAL EXAM:   Physical Exam  Constitutional:       General: She is not in acute distress.     Appearance: Normal appearance. She is not diaphoretic.   HENT:      Head: Atraumatic.      Mouth/Throat:      Mouth: Mucous membranes are moist.   Eyes:      General: No scleral icterus.     Conjunctiva/sclera: Conjunctivae normal.   Cardiovascular:      Rate and Rhythm: Normal rate.      Heart  sounds: Normal heart sounds.   Pulmonary:      Effort: Pulmonary effort is normal. No tachypnea or respiratory distress.      Breath sounds: No stridor. Examination of the right-lower field reveals rales. Rales present.   Abdominal:      General: Abdomen is flat.   Musculoskeletal:      Cervical back: Neck supple.   Skin:     General: Skin is warm.      Findings: No rash.   Neurological:      Mental Status: She is alert.             Results     LAB:  All pertinent labs reviewed and interpreted  Labs Ordered and Resulted from Time of ED Arrival to Time of ED Departure   BASIC METABOLIC PANEL - Abnormal       Result Value    Sodium 146 (*)     Potassium 3.6      Chloride 111 (*)     Carbon Dioxide (CO2) 27      Anion Gap 8      Urea Nitrogen 9.0      Creatinine 0.71      GFR Estimate >90      Calcium 8.1 (*)     Glucose 97     NT PROBNP INPATIENT - Normal    N terminal Pro BNP Inpatient 95     TROPONIN T, HIGH SENSITIVITY - Normal    Troponin T, High Sensitivity 10     INFLUENZA A/B, RSV AND SARS-COV2 PCR - Normal    Influenza A PCR Negative      Influenza B PCR Negative      RSV PCR Negative      SARS CoV2 PCR Negative     TROPONIN T, HIGH SENSITIVITY - Normal    Troponin T, High Sensitivity 10     CBC WITH PLATELETS AND DIFFERENTIAL    WBC Count 5.5      RBC Count 4.75      Hemoglobin 13.2      Hematocrit 40.1      MCV 84      MCH 27.8      MCHC 32.9      RDW 13.5      Platelet Count 217      % Neutrophils 70      % Lymphocytes 21      % Monocytes 7      % Eosinophils 2      % Basophils 1      % Immature Granulocytes 0      NRBCs per 100 WBC 0      Absolute Neutrophils 3.9      Absolute Lymphocytes 1.2      Absolute Monocytes 0.4      Absolute Eosinophils 0.1      Absolute Basophils 0.0      Absolute Immature Granulocytes 0.0      Absolute NRBCs 0.0         RADIOLOGY:  Chest XR,  PA & LAT   Final Result   IMPRESSION: Negative chest.            ECG:  Performed at: 10:13    Impression: Sinus rhythm, normal  ECG    Rate: 76 BPM   Rhythm: sinus  Axis: 48 2 58   AL Interval: 202 ms  QRS Interval: 94 ms  QTc Interval: 402/452 ms  ST Changes: none  Comparison: When compared to ECG of 8/30/2024, no significant change was found    EKG results reviewed and interpreted by Dr. Sterling, ED MD.     PROCEDURES:  none      Ida Santiago PA-C   Emergency Medicine   [facility] EMERGENCY DEPARTMENT      Ida Santiago PA-C  04/22/25 [id number]

## 2025-04-22 NOTE — TELEPHONE ENCOUNTER
Roselyn BOO called from [facility]. She notes that she will not get an INR today. When she arrived the patient was being taken by ambulance for SOB.    She will follow up when she knows more.    Kindra Garay RN    [facility]

## 2025-04-22 NOTE — TELEPHONE ENCOUNTER
"ANTICOAGULATION  MANAGEMENT     Interacting Medication Review    Interacting medication(s): Azithromycin (Zithromax, Z-pallavi) and longer prednisone taper with warfarin.    Duration: 5 days (4/22 to 4/26)    Indication:  lower respiratory infection    New medication?: Yes, interaction may increase INR and risk of bleeding. Closer INR monitoring recommended.        PLAN     Continue your current warfarin dose with next INR in 3 days        Summary  As of 4/22/2025      Full warfarin instructions:  12.5 mg every Mon, Thu; 10 mg all other days   Next INR check:  4/25/2025               Telephone call with [facility] nurse who verbalizes understanding and agrees to plan and who agrees to plan and repeated back plan correctly    New recheck date updated on anticoagulation calendar     ACC priority set/moved to \"high\" for new major drug interaction    Plan made per [facility] anticoagulation protocol    Teja Sutherland RN  4/22/2025  Anticoagulation Clinic  [facility] CoreTrace for routing messages: raquel MATHUR  [facility] patient phone line: [phone]    "

## 2025-04-23 NOTE — TELEPHONE ENCOUNTER
"Return call from Bettina, Clinical Supervisor with [facility] reporting while updating med list alert noted for \"major drug to drug interaction between azithromycin and quetiapine and warfarin,\" and needs verbal okay to go ahead and take despite drug interaction(s) warning.     Please advise on verbal orders request for drug interaction warning.        Paula Rollins RN  [facility]    "

## 2025-04-23 NOTE — TELEPHONE ENCOUNTER
Screenshot of Epic Secure Chat message from Dr. Morin, as below:      Per chart review, antiocoag team has been in contact with patient's home care nurse regarding major drug interaction (please see telephone encounter from 4/22/25 for further details).      Called Bettina crabtree, Clinical Supervisor with [facility] at the [facility] and relayed above message from Dr. Morin as written.  Bettina confirmed their home care nurse has already been in contact with the anticoag team and plan in place for monitoring INRs with major drug interaction.  No further questions/concerns at this time.          Paula Rollins RN  [facility]

## 2025-04-23 NOTE — TELEPHONE ENCOUNTER
Dr. Morin gave verbal okay for requested verbal orders to add medications from 4/22/25 ED visit.      Called Bettina crabtree, Clinical Supervisor with [facility] and relayed okay for requested verbal orders.      Signed updated med list faxed to [facility] at [phone].        Paula Rollins RN  [facility]

## 2025-04-24 ENCOUNTER — PATIENT OUTREACH (OUTPATIENT)
Dept: CARE COORDINATION | Facility: CLINIC | Age: 65
End: 2025-04-24
Payer: COMMERCIAL

## 2025-04-24 NOTE — LETTER
Valentina Olmedo  [id number]   [place] [id number]    Dear Valentina Olmedo,      I am a team member within the Connected [facility] Resource Center with [facility]. I recently tried to reach you to ensure you were doing well following a recent visit within our health system. I also wanted to take this chance to introduce Clinic Care Coordination.     Below is a description of Clinic Care Coordination and how this team can further assist you:       The Clinic Care Coordination team is made up of a Registered Nurse, , Financial Resource Worker, and a Community Health Worker who understand and can help navigate the health care system. The goal of clinic care coordination is to help you manage your health, improve access to care, and achieve optimal health outcomes. They work alongside your provider to assist you in determining your health and social needs, obtain health care and community resources, and provide you with necessary information and education. Clinic Care Coordination can work with you through any barriers and develop a care plan that helps coordinate and strengthen the relationship between you and your care team.    If you wish to connect with the Clinic Care Coordination Team, please let your [facility] Primary Care Provider or Clinic Care Team know and they can place a referral. The Clinic Care Coordination team will then reach out by phone to further support you.    We are focused on providing you with the highest-quality healthcare experience possible.    Sincerely,   Your care team with [facility]'s [address] [place] [address] ([phone]).    Gracie Burgos, [facility]  Care Coordination

## 2025-04-24 NOTE — PROGRESS NOTES
[facility] Resource Center Contact  [facility]/Voicemail     Clinical Data: Care Coordination ED-sourced Outreach-     Outreach attempted x 2.  Left message on patient's voicemail, providing [facility]'s 24/7 scheduling and nurse triage phone number [phone] ([phone]) for questions/concerns and/or to schedule an appt with an [facility] provider.      Care Coordination introduction letter with explanation of Clinic Care Coordination services sent to patient via [employer]. Clinic Care Coordination services remain available via referral if needed.    Plan: [facility] will do no further outreaches at this time.       Gracie Burgos MA  [facility], [facility]    *Connected Care Resource Team does NOT follow patient ongoing. Referrals are identified based on internal discharge reports and the outreach is to ensure patient has an understanding of their discharge instructions.

## 2025-04-28 ENCOUNTER — TELEPHONE (OUTPATIENT)
Dept: FAMILY MEDICINE | Facility: CLINIC | Age: 65
End: 2025-04-28
Payer: COMMERCIAL

## 2025-04-28 DIAGNOSIS — J44.9 CHRONIC OBSTRUCTIVE PULMONARY DISEASE, UNSPECIFIED COPD TYPE (H): ICD-10-CM

## 2025-04-28 DIAGNOSIS — J45.30 MILD PERSISTENT ASTHMA WITHOUT COMPLICATION: ICD-10-CM

## 2025-04-28 RX ORDER — BUDESONIDE AND FORMOTEROL FUMARATE DIHYDRATE 160; 4.5 UG/1; UG/1
2 AEROSOL RESPIRATORY (INHALATION) 2 TIMES DAILY
Qty: 10.2 G | Refills: 2 | Status: SHIPPED | OUTPATIENT
Start: 2025-04-28

## 2025-04-28 NOTE — TELEPHONE ENCOUNTER
LAST OFFICE VISIT: 12/05/2024    FOLLOW UP INFORMATION: 6 months     DO THEY HAVE AN APPOINTMENT SCHEDULED? Yes     ARE THEY STILL ON THIS MEDICATON?  Yes-per note 12/11/2024 pt was switched due to insurance.    Paula Gonzalez LPN

## 2025-04-28 NOTE — TELEPHONE ENCOUNTER
Patient following up on status of medication requests.    Wegovy 0.5 mg: Last ordered on 04/14/2025, transmission complete to [place]. Recommended patient call pharmacy.    Symbicort: last ordered today, 04/28/2025. Rx sent to [facility] in [place].    Patient verbalized understanding.    Dayna Vee RN

## 2025-04-29 ENCOUNTER — DOCUMENTATION ONLY (OUTPATIENT)
Dept: ANTICOAGULATION | Facility: CLINIC | Age: 65
End: 2025-04-29
Payer: COMMERCIAL

## 2025-04-29 ENCOUNTER — ANTICOAGULATION THERAPY VISIT (OUTPATIENT)
Dept: ANTICOAGULATION | Facility: CLINIC | Age: 65
End: 2025-04-29
Payer: COMMERCIAL

## 2025-04-29 DIAGNOSIS — Z79.01 LONG TERM (CURRENT) USE OF ANTICOAGULANTS: ICD-10-CM

## 2025-04-29 DIAGNOSIS — I26.99 PULMONARY EMBOLISM WITH INFARCTION (H): Primary | ICD-10-CM

## 2025-04-29 DIAGNOSIS — Z86.711 HISTORY OF PULMONARY EMBOLISM: ICD-10-CM

## 2025-04-29 LAB — INR (EXTERNAL): 1.5 (ref 0.9–1.1)

## 2025-04-29 NOTE — PROGRESS NOTES
ANTICOAGULATION CLINIC REFERRAL RENEWAL REQUEST       An annual renewal order is required for all patients referred to [facility].?  Please review and sign the pended referral order for Valentina Olmedo.       ANTICOAGULATION SUMMARY      Warfarin indication(s)   PE    Mechanical heart valve present?  NO       Current goal range   INR: 2.0-3.0     Goal appropriate for indication? Goal INR 2-3, standard for indication(s) above     Time in Therapeutic Range (TTR)  (Goal > 60%) 52.2%       Office visit with referring provider's group within last year yes on 4/14/25       Teja Sutherland RN  [facility]

## 2025-04-29 NOTE — PROGRESS NOTES
ANTICOAGULATION MANAGEMENT     Valentina Olmedo 65 year old female is on warfarin with subtherapeutic INR result. (Goal INR 2.0-3.0)    Recent labs: (last 7 days)     04/29/25  1224   INR 1.5*       ASSESSMENT     Source(s): Chart Review and Home Care/Facility Nurse     Warfarin doses taken: Less warfarin taken than planned which may be affecting INR. While in the ER on 4/25/25, the ER provider advised patient to hold warfarin on 4/25/25 and 4/26/25.  Diet: No new diet changes identified. Patient notes she continues to NOT drink premier protein at all due to cost. Patient will update [facility] if she restarts.   Medication/supplement changes: None noted  New illness, injury, or hospitalization: Yes: Patient was seen in the ER on 4/25/25 for shortness of breath. Patient was evaluated, and advised to use nebulizer. Patient called triage again on 4/27/25 reporting shortness of breath and running out of her inhaler. Patient reports she is feeling better today now that she is taking her medications as directed.   Signs or symptoms of bleeding or clotting: No  Previous result: Supratherapeutic  Additional findings: None       PLAN     Recommended plan for temporary change(s) and ongoing change(s) affecting INR     Dosing Instructions: Continue your current warfarin dose with next INR in 1 week       Summary  As of 4/29/2025      Full warfarin instructions:  12.5 mg every Mon, Thu; 10 mg all other days   Next INR check:  --               Telephone call with [facility] home care nurse who verbalizes understanding and agrees to plan    Orders given to  Homecare nurse/facility to recheck    Education provided: Please call back if any changes to your diet, medications or how you've been taking warfarin    Plan made with [facility] Pharmacist Paula Cheng, [id number]  4/29/2025  Anticoagulation Clinic  [employer] for routing messages: raquel MATHUR  [facility] patient phone line:  [phone]        _______________________________________________________________________     Anticoagulation Episode Summary       Current INR goal:  2.0-3.0   TTR:  52.7% (1 y)   Target end date:  Indefinite   Send INR reminders to:  PIPO MATHUR    Indications    History of pulmonary embolism (Resolved) [Z86.711]  Pulmonary embolism with infarction (H) [I26.99]  Long term (current) use of anticoagulants [Z79.01]  History of pulmonary embolism [Z86.711]             Comments:  --             Anticoagulation Care Providers       Provider Role Specialty Phone number    Promise Vanegas MD Referring Family Medicine [phone]    Donald Rooney MD Referring Internal Medicine [phone]    Jose Maria Morin MD Referring Family Medicine [phone]

## 2025-05-01 ENCOUNTER — MEDICAL CORRESPONDENCE (OUTPATIENT)
Dept: HEALTH INFORMATION MANAGEMENT | Facility: CLINIC | Age: 65
End: 2025-05-01
Payer: COMMERCIAL

## 2025-05-01 LAB
ATRIAL RATE - MUSE: 76 BPM
DIASTOLIC BLOOD PRESSURE - MUSE: NORMAL MMHG
INTERPRETATION ECG - MUSE: NORMAL
P AXIS - MUSE: 48 DEGREES
PR INTERVAL - MUSE: 202 MS
QRS DURATION - MUSE: 94 MS
QT - MUSE: 402 MS
QTC - MUSE: 452 MS
R AXIS - MUSE: 2 DEGREES
SYSTOLIC BLOOD PRESSURE - MUSE: NORMAL MMHG
T AXIS - MUSE: 58 DEGREES
VENTRICULAR RATE- MUSE: 76 BPM

## 2025-05-06 ENCOUNTER — ANTICOAGULATION THERAPY VISIT (OUTPATIENT)
Dept: ANTICOAGULATION | Facility: CLINIC | Age: 65
End: 2025-05-06
Payer: COMMERCIAL

## 2025-05-06 DIAGNOSIS — Z86.711 HISTORY OF PULMONARY EMBOLISM: ICD-10-CM

## 2025-05-06 DIAGNOSIS — Z79.01 LONG TERM (CURRENT) USE OF ANTICOAGULANTS: ICD-10-CM

## 2025-05-06 DIAGNOSIS — I26.99 PULMONARY EMBOLISM WITH INFARCTION (H): Primary | ICD-10-CM

## 2025-05-06 LAB — INR (EXTERNAL): 4.5 (ref 0.9–1.1)

## 2025-05-06 NOTE — PROGRESS NOTES
ANTICOAGULATION MANAGEMENT     Valentina Olmedo 65 year old female is on warfarin with supratherapeutic INR result. (Goal INR 2.0-3.0)    Recent labs: (last 7 days)     05/06/25  1225   INR 4.5*       ASSESSMENT     Source(s): Chart Review and Home Care/Facility Nurse     Warfarin doses taken: Warfarin taken as instructed  Diet: Decreased greens/vitamin K in diet; plans to resume previous intake  Medication/supplement changes: Patient was given ciprodex ear drops in the ER   New illness, injury, or hospitalization: Yes: Patient was seen in the ER ob 5/3/25 for complaints of chest pain. Patient was given x1 dose of Ativan and symptoms improved- thought to be anxiety from ER provider. Patient advised to follow up with primary care provider. Patient denies chest pain today. Patient was also seen in the ER on 5/5/25 for her ears being plugged. Patient's ears were flushed and patient was given prescription for ear drops.  Signs or symptoms of bleeding or clotting: No  Previous result: Subtherapeutic  Additional findings: None       PLAN     Recommended plan for no diet, medication or health factor changes affecting INR     Dosing Instructions: partial hold then continue your current warfarin dose with next INR in 1 week       Summary  As of 5/6/2025      Full warfarin instructions:  5/6: 5 mg; Otherwise 12.5 mg every Mon, Thu; 10 mg all other days   Next INR check:  5/13/2025               Telephone call with [place] home care nurse who verbalizes understanding and agrees to plan    Orders given to  Homecare nurse/facility to recheck    Education provided: Please call back if any changes to your diet, medications or how you've been taking warfarin  Dietary considerations: importance of consistent vitamin K intake  Symptom monitoring: monitoring for bleeding signs and symptoms and monitoring for clotting signs and symptoms    Plan made per [facility] anticoagulation protocol    Zari Cheng RN  5/6/2025  Anticoagulation  Clinic  Epic pool for routing messages: raquel MATHUR  [facility] patient phone line: [phone]        _______________________________________________________________________     Anticoagulation Episode Summary       Current INR goal:  2.0-3.0   TTR:  53.4% (1 y)   Target end date:  Indefinite   Send INR reminders to:  PIPO [facility]ATH[facility]    Indications    History of pulmonary embolism (Resolved) [Z86.711]  Pulmonary embolism with infarction (H) [I26.99]  Long term (current) use of anticoagulants [Z79.01]  History of pulmonary embolism [Z86.711]             Comments:  --             Anticoagulation Care Providers       Provider Role Specialty Phone number    Promise Vanegas MD Referring Family Medicine [phone]    Donald Rooney MD Referring Internal Medicine [phone]    Jose Maria Morin MD Referring Family Medicine [phone]

## 2025-05-07 ENCOUNTER — NURSE TRIAGE (OUTPATIENT)
Dept: FAMILY MEDICINE | Facility: CLINIC | Age: 65
End: 2025-05-07
Payer: COMMERCIAL

## 2025-05-07 DIAGNOSIS — F25.9 SCHIZOAFFECTIVE DISORDER, UNSPECIFIED TYPE (H): ICD-10-CM

## 2025-05-07 RX ORDER — ARIPIPRAZOLE 10 MG/1
10 TABLET ORAL EVERY MORNING
Qty: 30 TABLET | Refills: 0 | Status: SHIPPED | OUTPATIENT
Start: 2025-05-07

## 2025-05-07 NOTE — TELEPHONE ENCOUNTER
[facility] Call Center    Phone Message    May a detailed message be left on voicemail: yes     Reason for Call: Order(s): Home Care Orders: Physical Therapy (PT): 1 time a week for 3 weeks then every other week for 4 weeks, pulse was 48 and blood pressure was 110/58 after visit today.       Action Taken: Message routed to:  [facility] ([facility] PCC    Travel Screening: Not Applicable                                                                     Patient should stop Wegovy.  It is possible her symptoms are related to this medication.    Has she spoken with her psychiatrist?  Anxiety is the most likely explanation of her symptoms.  How did she respond to lorazepam?  The 5/3 emergency department visit describes some attempt at manipulation of the emergency department physician to get a prescription for lorazepam.    I would suggest going up on aripiprazole to 10 mg/day.  Based on adherence data she is on 7 mg/day.  If she has not seen her psychiatrist I would make this change unilaterally.  She needs to see psychiatry as these symptoms seem to be related to anxiety.

## 2025-05-07 NOTE — TELEPHONE ENCOUNTER
"Incoming call from patient reporting \"I'm very very anxious and I need to get on a medication, or something.\"      States she's been anxious \"for a long time,\" and is anxious \"because of my breathing.\"  Feels short of breath at rest and with activity.      Reports she's been to the ER several times, most recently on 5/6/25 ([facility]) \"they say nothing's wrong.\"      States she passed out yesterday     Hx of COPD - States she has been taking Symbicort 2 puffs BID as prescribed.  Albuterol not helpful.        Patient crying throughout call.  RN had difficulty triaging symptoms, attempted several times to inquire about her symptoms but patient continually repeated \"it's my breathing.\"      Advised ER if breathing was severe.  Patient states \"I'm not going to the ER, I was just there yesterday and they did nothing.\"  Appointment scheduled with Conchita MARKHAM CNP on 5/9/25 at 11:40am as first available opening in clinic.  Upon chart review, patient has been to the ER 7 times since she was last seen in clinic on 4/14/25.        Will discuss with Dr. Morin and call patient back.          Paula Rollins RN  [facility]        Reason for Disposition   MODERATE difficulty breathing (e.g., speaks in phrases, SOB even at rest, pulse 100-120) of new-onset or worse than normal    Additional Information   Negative: SEVERE difficulty breathing (e.g., struggling for each breath, speaks in single words, pulse > 120)   Negative: Breathing stopped and hasn't returned   Negative: Choking on something   Negative: Bluish (or gray) lips or face   Negative: Difficult to awaken or acting confused (e.g., disoriented, slurred speech)   Negative: Passed out (e.g., fainted, lost consciousness, blacked out and was not responding)   Negative: Wheezing started suddenly after medicine, an allergic food, or bee sting   Negative: Stridor (harsh sound while breathing in)   Negative: Slow, shallow and weak " breathing   Negative: Sounds like a life-threatening emergency to the triager   Negative: Chest pain   Negative: Wheezing (high pitched whistling sound) and previous asthma attacks or use of asthma medicines   Negative: Breathing difficulty and within 14 days of COVID-19 EXPOSURE (close contact) with someone diagnosed with COVID-19 (e.g., COVID test positive)   Negative: Breathing difficulty and COVID-19 is widespread in the community   Negative: Breathing diffculty and only present when coughing   Negative: Breathing difficulty and only from stuffy nose   Negative: Breathing diffculty and only from stuffy nose or runny nose from common cold    Protocols used: Breathing Difficulty-A-[place]

## 2025-05-08 ENCOUNTER — MEDICAL CORRESPONDENCE (OUTPATIENT)
Dept: HEALTH INFORMATION MANAGEMENT | Facility: CLINIC | Age: 65
End: 2025-05-08
Payer: COMMERCIAL

## 2025-05-08 DIAGNOSIS — J44.9 CHRONIC OBSTRUCTIVE PULMONARY DISEASE, UNSPECIFIED COPD TYPE (H): ICD-10-CM

## 2025-05-08 RX ORDER — ALBUTEROL SULFATE 90 UG/1
2 INHALANT RESPIRATORY (INHALATION) EVERY 4 HOURS PRN
Qty: 18 G | Refills: 5 | OUTPATIENT
Start: 2025-05-08

## 2025-05-08 NOTE — TELEPHONE ENCOUNTER
Medication Request  Medication name: albuterol (PROAIR HFA) 108 (90 Base) MCG/ACT inhaler   Requested Pharmacy: [facility]  When was patient last seen for this?:  04/14/2025  Patient offered appointment:  no  Okay to leave a detailed message: no

## 2025-05-09 DIAGNOSIS — M48.02 CERVICAL STENOSIS OF SPINAL CANAL: ICD-10-CM

## 2025-05-09 DIAGNOSIS — F25.9 SCHIZOAFFECTIVE DISORDER, UNSPECIFIED TYPE (H): ICD-10-CM

## 2025-05-11 ENCOUNTER — APPOINTMENT (OUTPATIENT)
Dept: CT IMAGING | Facility: CLINIC | Age: 65
End: 2025-05-11
Attending: EMERGENCY MEDICINE
Payer: COMMERCIAL

## 2025-05-11 ENCOUNTER — APPOINTMENT (OUTPATIENT)
Dept: RADIOLOGY | Facility: CLINIC | Age: 65
End: 2025-05-11
Attending: EMERGENCY MEDICINE
Payer: COMMERCIAL

## 2025-05-11 ENCOUNTER — HOSPITAL ENCOUNTER (EMERGENCY)
Facility: CLINIC | Age: 65
Discharge: HOME | End: 2025-05-11
Attending: EMERGENCY MEDICINE | Admitting: EMERGENCY MEDICINE
Payer: COMMERCIAL

## 2025-05-11 VITALS
SYSTOLIC BLOOD PRESSURE: 118 MMHG | RESPIRATION RATE: 18 BRPM | HEART RATE: 79 BPM | DIASTOLIC BLOOD PRESSURE: 73 MMHG | HEIGHT: 66 IN | OXYGEN SATURATION: 96 % | BODY MASS INDEX: 47.09 KG/M2 | TEMPERATURE: 97.7 F | WEIGHT: 293 LBS

## 2025-05-11 DIAGNOSIS — F41.9 ANXIETY: ICD-10-CM

## 2025-05-11 LAB
ALBUMIN SERPL BCG-MCNC: 3.6 G/DL (ref 3.5–5.2)
ALP SERPL-CCNC: 53 U/L (ref 40–150)
ALT SERPL W P-5'-P-CCNC: 25 U/L (ref 0–50)
ANION GAP SERPL CALCULATED.3IONS-SCNC: 15 MMOL/L (ref 7–15)
AST SERPL W P-5'-P-CCNC: 21 U/L (ref 0–45)
ATRIAL RATE - MUSE: 75 BPM
BASE EXCESS BLDV CALC-SCNC: 2.5 MMOL/L (ref -3–3)
BASOPHILS # BLD AUTO: 0 10E3/UL (ref 0–0.2)
BASOPHILS NFR BLD AUTO: 1 %
BILIRUB SERPL-MCNC: 0.3 MG/DL
BUN SERPL-MCNC: 8.2 MG/DL (ref 8–23)
CALCIUM SERPL-MCNC: 8.7 MG/DL (ref 8.8–10.4)
CHLORIDE SERPL-SCNC: 105 MMOL/L (ref 98–107)
CREAT SERPL-MCNC: 0.7 MG/DL (ref 0.51–0.95)
DIASTOLIC BLOOD PRESSURE - MUSE: 73 MMHG
EGFRCR SERPLBLD CKD-EPI 2021: >90 ML/MIN/1.73M2
EOSINOPHIL # BLD AUTO: 0.1 10E3/UL (ref 0–0.7)
EOSINOPHIL NFR BLD AUTO: 2 %
ERYTHROCYTE [DISTWIDTH] IN BLOOD BY AUTOMATED COUNT: 13.7 % (ref 10–15)
GLUCOSE SERPL-MCNC: 107 MG/DL (ref 70–99)
HCO3 BLDV-SCNC: 27 MMOL/L (ref 21–28)
HCO3 SERPL-SCNC: 20 MMOL/L (ref 22–29)
HCT VFR BLD AUTO: 39.8 % (ref 35–47)
HGB BLD-MCNC: 13.4 G/DL (ref 11.7–15.7)
IMM GRANULOCYTES # BLD: 0 10E3/UL
IMM GRANULOCYTES NFR BLD: 0 %
INR PPP: 3.64 (ref 0.85–1.15)
INTERPRETATION ECG - MUSE: NORMAL
LACTATE SERPL-SCNC: 0.7 MMOL/L (ref 0.7–2)
LYMPHOCYTES # BLD AUTO: 1.3 10E3/UL (ref 0.8–5.3)
LYMPHOCYTES NFR BLD AUTO: 31 %
MCH RBC QN AUTO: 27.7 PG (ref 26.5–33)
MCHC RBC AUTO-ENTMCNC: 33.7 G/DL (ref 31.5–36.5)
MCV RBC AUTO: 82 FL (ref 78–100)
MONOCYTES # BLD AUTO: 0.4 10E3/UL (ref 0–1.3)
MONOCYTES NFR BLD AUTO: 9 %
NEUTROPHILS # BLD AUTO: 2.5 10E3/UL (ref 1.6–8.3)
NEUTROPHILS NFR BLD AUTO: 58 %
NRBC # BLD AUTO: 0 10E3/UL
NRBC BLD AUTO-RTO: 0 /100
NT-PROBNP SERPL-MCNC: 99 PG/ML
O2/TOTAL GAS SETTING VFR VENT: 21 %
OXYHGB MFR BLDV: 81 % (ref 70–75)
P AXIS - MUSE: 9 DEGREES
PCO2 BLDV: 41 MM HG (ref 40–50)
PH BLDV: 7.43 [PH] (ref 7.32–7.43)
PLATELET # BLD AUTO: 170 10E3/UL (ref 150–450)
PO2 BLDV: 45 MM HG (ref 25–47)
POTASSIUM SERPL-SCNC: 3.5 MMOL/L (ref 3.4–5.3)
PR INTERVAL - MUSE: 200 MS
PROT SERPL-MCNC: 5.9 G/DL (ref 6.4–8.3)
PROTHROMBIN TIME: 35.6 SECONDS (ref 11.8–14.8)
QRS DURATION - MUSE: 90 MS
QT - MUSE: 378 MS
QTC - MUSE: 422 MS
R AXIS - MUSE: -15 DEGREES
RBC # BLD AUTO: 4.84 10E6/UL (ref 3.8–5.2)
SAO2 % BLDV: 82.8 % (ref 70–75)
SODIUM SERPL-SCNC: 140 MMOL/L (ref 135–145)
SYSTOLIC BLOOD PRESSURE - MUSE: 118 MMHG
T AXIS - MUSE: 35 DEGREES
TROPONIN T SERPL HS-MCNC: 10 NG/L
VENTRICULAR RATE- MUSE: 75 BPM
WBC # BLD AUTO: 4.2 10E3/UL (ref 4–11)

## 2025-05-11 PROCEDURE — 82805 BLOOD GASES W/O2 SATURATION: CPT | Performed by: EMERGENCY MEDICINE

## 2025-05-11 PROCEDURE — 71046 X-RAY EXAM CHEST 2 VIEWS: CPT

## 2025-05-11 PROCEDURE — 85025 COMPLETE CBC W/AUTO DIFF WBC: CPT | Performed by: EMERGENCY MEDICINE

## 2025-05-11 PROCEDURE — 83605 ASSAY OF LACTIC ACID: CPT | Performed by: EMERGENCY MEDICINE

## 2025-05-11 PROCEDURE — 84484 ASSAY OF TROPONIN QUANT: CPT | Performed by: EMERGENCY MEDICINE

## 2025-05-11 PROCEDURE — 83880 ASSAY OF NATRIURETIC PEPTIDE: CPT | Performed by: EMERGENCY MEDICINE

## 2025-05-11 PROCEDURE — 85610 PROTHROMBIN TIME: CPT | Performed by: EMERGENCY MEDICINE

## 2025-05-11 PROCEDURE — 82947 ASSAY GLUCOSE BLOOD QUANT: CPT | Performed by: EMERGENCY MEDICINE

## 2025-05-11 PROCEDURE — 250N000009 HC RX 250: Performed by: EMERGENCY MEDICINE

## 2025-05-11 PROCEDURE — 94640 AIRWAY INHALATION TREATMENT: CPT

## 2025-05-11 PROCEDURE — 93005 ELECTROCARDIOGRAM TRACING: CPT | Performed by: EMERGENCY MEDICINE

## 2025-05-11 PROCEDURE — 250N000013 HC RX MED GY IP 250 OP 250 PS 637: Performed by: EMERGENCY MEDICINE

## 2025-05-11 PROCEDURE — 99285 EMERGENCY DEPT VISIT HI MDM: CPT | Mod: 25

## 2025-05-11 PROCEDURE — 70450 CT HEAD/BRAIN W/O DYE: CPT

## 2025-05-11 PROCEDURE — 36415 COLL VENOUS BLD VENIPUNCTURE: CPT | Performed by: EMERGENCY MEDICINE

## 2025-05-11 RX ORDER — ARIPIPRAZOLE 10 MG/1
10 TABLET ORAL EVERY MORNING
Qty: 21 TABLET | Refills: 0 | Status: SHIPPED | OUTPATIENT
Start: 2025-05-11 | End: 2025-05-12

## 2025-05-11 RX ORDER — LORAZEPAM 1 MG/1
1 TABLET ORAL ONCE
Status: COMPLETED | OUTPATIENT
Start: 2025-05-11 | End: 2025-05-11

## 2025-05-11 RX ORDER — IPRATROPIUM BROMIDE AND ALBUTEROL SULFATE 2.5; .5 MG/3ML; MG/3ML
3 SOLUTION RESPIRATORY (INHALATION) ONCE
Status: COMPLETED | OUTPATIENT
Start: 2025-05-11 | End: 2025-05-11

## 2025-05-11 RX ORDER — LORAZEPAM 2 MG/ML
1 INJECTION INTRAMUSCULAR ONCE
Status: COMPLETED | OUTPATIENT
Start: 2025-05-11 | End: 2025-05-11

## 2025-05-11 RX ORDER — PREGABALIN 25 MG/1
25 CAPSULE ORAL 2 TIMES DAILY
Qty: 56 CAPSULE | Refills: 5 | Status: SHIPPED | OUTPATIENT
Start: 2025-05-11

## 2025-05-11 RX ADMIN — LORAZEPAM 1 MG: 1 TABLET ORAL at 20:09

## 2025-05-11 RX ADMIN — IPRATROPIUM BROMIDE AND ALBUTEROL SULFATE 3 ML: .5; 3 SOLUTION RESPIRATORY (INHALATION) at 19:41

## 2025-05-11 ASSESSMENT — ACTIVITIES OF DAILY LIVING (ADL)
ADLS_ACUITY_SCORE: 41

## 2025-05-11 ASSESSMENT — COLUMBIA-SUICIDE SEVERITY RATING SCALE - C-SSRS
1. IN THE PAST MONTH, HAVE YOU WISHED YOU WERE DEAD OR WISHED YOU COULD GO TO SLEEP AND NOT WAKE UP?: NO
2. HAVE YOU ACTUALLY HAD ANY THOUGHTS OF KILLING YOURSELF IN THE PAST MONTH?: NO
6. HAVE YOU EVER DONE ANYTHING, STARTED TO DO ANYTHING, OR PREPARED TO DO ANYTHING TO END YOUR LIFE?: NO

## 2025-05-11 NOTE — ED TRIAGE NOTES
Pt called EMS for C/O shortness of breath and chest pain that has been going on for several weeks.  EMS state multiple calls and ED visits for the same without definitive diagnosis.  Pt currently is C/O shortness of breath, chest pain and throat pain.  Denies fevers.  Duo nob given by EMS for patient comfort.     Triage Assessment (Adult)       Row Name 05/11/25 1846          Triage Assessment    Airway WDL WDL        Respiratory WDL    Respiratory WDL WDL        Skin Circulation/Temperature WDL    Skin Circulation/Temperature WDL WDL        Cardiac WDL    Cardiac WDL WDL        Cognitive/Neuro/Behavioral WDL    Cognitive/Neuro/Behavioral WDL WDL

## 2025-05-11 NOTE — ED PROVIDER NOTES
EMERGENCY DEPARTMENT ENCOUNTER      NAME: Valentina Olmedo  AGE: 65 year old female  YOB: 1960  MRN: [id number]  EVALUATION DATE & TIME: 2025  6:34 PM    PCP: Jose Maria Morin    ED PROVIDER: Raine Almonte M.D.      Chief Complaint   Patient presents with    Shortness of Breath       FINAL IMPRESSION:  1. Anxiety        ED COURSE & MEDICAL DECISION MAKIN.  Shortness of breath, fall yesterday.  Shortness of breath differentials considered include asthma exacerbation/COPD exacerbation, viral URI, pneumonia, ACS, arrhythmia, CHF exacerbation, anxiety, deconditioning.  Less likely pulmonary embolism given patient's compliance with warfarin.  No symptoms to suggest URI such as cough.  No significant findings on exam, normal O2 sat on room air, DuoNeb given for very minimal wheeze and reports of shortness of breath despite normal respiratory rate and speaking in full sentences.  I ordered a BNP, lactic acid, VBG, coags, troponin.  I reviewed EKG which is stable in sinus rhythm.  I ordered a chest x-ray which I read as negative for acute process.  I ordered a CT head after INR resulted at 3.64, slightly elevated.  CT head negative for acute process on my read.  I also ordered patient lorazepam tablet, she declined IV.  I discussed disposition with patient and family member, no indication for admission.  Desires to go home.  States she has a psychiatry follow-up appointment.  Urged to come back to the ER if she feels she has any medical emergency.  Suspect anxiety component to shortness of breath in addition to possible asthma exacerbation.   Recommended 5 mg warfarin tomorrow, already took dose today.  Then resume normal dosing.  This was based on previous anticoagulation clinic recommendations the last time she was slightly supratherapeutic.      6:41 PM I met with the patient to gather history and to perform my initial exam. I discussed the plan for care while in the Emergency Department.    "  Pertinent Labs & Imaging studies reviewed. (See chart for details).      Medical Decision Making  I obtained history from patient and , I reviewed the EMR as documented in HPI, ED course, and chart review section above and below, Care impacted by chronic conditions/past medical history as documented in HPI, ED course, and chart review section above below, I independently interpreted Radiological studies as documented in Radiology section below. See radiology report for final interpretation., and I discussed the care with another health care provider: NA    Discharge. I recommended the patient continue their current prescription strength medication(s): and decrease warfarin to 5mg for one dose tomorrow then resume normal dosing.. See documentation for any additional details.    MIPS (CTPE, Dental pain, Smith, Sinusitis, Asthma/COPD, Head Trauma): Adult Minor Head Trauma:Currently taking anticoagulant medications: warfarin or other novel anticoagulant medications    SEPSIS: None    At the conclusion of the encounter I discussed the results of all of the tests and the disposition. The questions were answered. The patient or family acknowledged understanding and was agreeable with the care plan.      CRITICAL CARE:  N/A    Lists of hospitals in the [place]    Patient information was obtained from: patient.    Use of : N/A.       Valentina Olmedo is a 65 year old female with a medical history of essential hypertension, morbid obese, COPD, chronic pain, DNR, thyroid cancer, hyperlipidemia, hypokalemia, nicotine use disorder, schizoaffective disorder depressive type, who presents shortness of breath.    Patient came to the ED for shortness of breath that has been present for the past couple weeks to months and has hx of falls, she lost her balance earlier yesterday. When asked if she had any pain or sustained any injuries from the fall she stated \"I don't know\". However patient states she felt short of breath prior to calling 911, she " "called because she tried albuterol and her ativan which didn't help her symptoms. When paramedics arrived, she was 100% on room air. She is compliant with her medications, denies smoking tobacco. She then changed her story and said her chest hurt. She denies any radiation of pain. She states she has leg swelling sometimes but today it \"isn't too bad\". She is on warfarin for a history of PE.    Per Chart Review: 5/9/25 [facility] nurse triage call for shortness of breath and sore throat. She has been seen several times in the ER within the last week with reassuring work ups for same concern. Not using albuterol neb as she ran out. Refill was sent today.  I reviewed pulmonology note from Dr. Shawnee Balderas on 12/5/2024: Exertional dyspnea multifactorial acute component related volume up status at diuresis to therapy, mild intermittent asthma continue albuterol, sleep apnea new CPAP ordered.  History of COPD, unspecified COPD type.  Reviewed echocardiogram from 2017: Essentially normal    REVIEW OF SYSTEMS  All other systems negative unless noted in HPI.    PAST MEDICAL HISTORY:  Past Medical History:   Diagnosis Date    Adrenal mass 10/12/2015    Chen Null MD  They were incidentally discovered on the CAT scan of the abdomen from December 2011 which was done for evaluation of kidney stones. F/up CT of the abdomen done on 6/26/12 showed a stable 5 mm nodular opacity in the right lower lung lobe, adjacent to the diagram. Bilateral adrenal masses average density measured less than 0 Hounsfield units, consistent with benign etio    Age-related cataract of both eyes, unspecified age-related cataract type 03/27/2024    Anxiety     Anxiety     Arthritis     Borderline personality disorder (H)     Cancer (H)     COPD (chronic obstructive pulmonary disease) (H)     Depression     Depressive disorder     History of COVID-19 4/19/2021    Hyperlipidemia     Hypertension     Hypertension  "    Hyponatremia     Hypothyroidism     Malignant neoplasm of thyroid gland (H)     Chen Null MD  She had R hemithyroidectomy for a right neck tumor in 1994. According to the patient, the tumor was benign. She is not sure whether or not the tumor belonged to the thyroid gland. The surgery was done here at the [facility]. In January 2011, she was diagnosed with kidney stones. She was found to have an elevated calcium level of 11.7, with a PTH level of 368. Stone an    Primary hyperparathyroidism           PTSD (post-traumatic stress disorder)     Pulmonary embolism with infarction (H) 01/12/2021    Recurrent otitis media     Seizure disorder (H)     Sleep apnea     cpap at [facility]    Stroke (H)     Tobacco abuse 9/29/2015    Vertigo        PAST SURGICAL HISTORY:  Past Surgical History:   Procedure Laterality Date    ABDOMEN SURGERY      ADRENALECTOMY Left     BRAIN SURGERY      COLONOSCOPY N/A 5/7/2024    Procedure: WITH COLONOSCOPY with polypectomies;  Surgeon: Walt Lira MD;  Location: [facility] OR    CRANIOTOMY FOR TEMPORAL LOBECTOMY Right     x3 for seizure    DILATION AND CURETTAGE      ESOPHAGOSCOPY, GASTROSCOPY, DUODENOSCOPY (EGD), COMBINED N/A 5/7/2024    Procedure: and biopsies;  Surgeon: Walt Lira MD;  Location: [facility] OR    ESOPHAGOSCOPY, GASTROSCOPY, DUODENOSCOPY (EGD), DILATATION, COMBINED N/A 5/7/2024    Procedure: ESOPHAGOGASTRODUODENOSCOPY with esophageal dilation;  Surgeon: Walt Lira MD;  Location: [facility] OR    HEAD & NECK SURGERY      HYSTERECTOMY, PAP NO LONGER INDICATED N/A     LITHOTRIPSY      PARATHYROID GLAND SURGERY      resection of adenoma    REFRACTIVE SURGERY Bilateral     RELEASE CARPAL TUNNEL Bilateral     TONSILLECTOMY & ADENOIDECTOMY      TOTAL THYROIDECTOMY      TOTAL VAGINAL HYSTERECTOMY      39 years old. Benign         CURRENT MEDICATIONS:    Current Facility-Administered Medications   Medication Dose Route Frequency Provider Last  Rate Last Admin    denosumab (PROLIA) injection 60 mg  60 mg Subcutaneous Q6 Months    60 mg at 11/14/24 1329     Current Outpatient Medications   Medication Sig Dispense Refill    albuterol (PROAIR HFA) 108 (90 Base) MCG/ACT inhaler Inhale 2 puffs into the lungs every 4 hours as needed for shortness of breath or wheezing. 18 g 5    albuterol (PROVENTIL) (2.5 MG/3ML) 0.083% neb solution Take 1 vial (2.5 mg) by nebulization every 6 hours as needed for shortness of breath, wheezing or cough. 90 mL 0    ARIPiprazole (ABILIFY) 10 MG tablet TAKE 1 TABLET BY MOUTH EVERY MORNING 21 tablet 0    Brivaracetam (BRIVIACT) 100 MG tablet Take 150 mg by mouth 2 times daily       budesonide-formoterol (SYMBICORT/BREYNA) 160-4.5 MCG/ACT Inhaler Inhale 2 puffs into the lungs 2 times daily. 10.2 g 2    carBAMazepine (TEGRETOL) 200 MG tablet 200mg in the  at lunch and 400mg at HS      denosumab (PROLIA) 60 MG/ML SOSY injection Inject 60 mg Subcutaneous once Per pt report      diazepam (VALIUM) 5 MG tablet Take 1 tablet (5 mg) by mouth every 6 hours as needed for anxiety ((One tab for your CT scan.  One tab for your MRI)). 2 tablet 0    DULoxetine (CYMBALTA) 60 MG capsule Take 2 capsules (120 mg) by mouth daily. 180 capsule 2    EPINEPHrine (ANY BX GENERIC EQUIV) 0.3 MG/0.3ML injection 2-pack Inject 0.3 mLs (0.3 mg) into the muscle as needed for anaphylaxis 2 each 1    esomeprazole (NEXIUM) 40 MG DR capsule Take 1 capsule (40 mg) by mouth every morning (before breakfast). Take 30-60 minutes before eating. 30 capsule 11    fluticasone (FLONASE) 50 MCG/ACT nasal spray INSTILL 1 SPRAY IN IN EACH NOSTRIL TWICE A DAY 16 g 2    furosemide (LASIX) 20 MG tablet Take 1 tablet (20 mg) by mouth daily. (Patient not taking: Reported on 4/14/2025) 30 tablet 12    GEMTESA 75 MG TABS tablet       levothyroxine (SYNTHROID/LEVOTHROID) 300 MCG tablet TAKE 1 TABLET BY MOUTH DAILY 28 tablet 12    LORazepam (ATIVAN) 0.5 MG tablet Take 1 tablet (0.5 mg)  by mouth every 6 hours as needed for anxiety. 15 tablet 0    melatonin 3 MG tablet Take 1 tablet (3 mg) by mouth nightly as needed for sleep. (Patient taking differently: Take 3 mg by mouth at bedtime.) 90 tablet 4    multivitamin with iron (CHROMAGEN) TABS half-tab Take 1 tablet by mouth daily (Patient not taking: Reported on 4/14/2025)      nystatin (MYCOSTATIN) 904060 UNIT/GM external cream Apply topically 2 times daily as needed (skin infection/inflammation) (Apply to the most sensitive inflamed areas of skin) (Patient not taking: Reported on 4/14/2025) 90 g 2    ondansetron (ZOFRAN ODT) 4 MG ODT tab Take 1 tablet (4 mg) by mouth every 8 hours as needed for nausea. 30 tablet 1    polyethylene glycol (MIRALAX) 17 GM/Dose powder  (Patient not taking: Reported on 4/14/2025)      polymixin b-trimethoprim (POLYTRIM) 82066-6.1 UNIT/ML-% ophthalmic solution Place 1-2 drops into the right eye every 4 hours (Patient not taking: Reported on 4/14/2025) 10 mL 0    prazosin (MINIPRESS) 2 MG capsule Take 1 capsule (2 mg) by mouth at bedtime. (Patient taking differently: Take 1 mg by mouth at bedtime.) 90 capsule 2    prednisoLONE acetate (PRED FORTE) 1 % ophthalmic suspension  (Patient not taking: Reported on 4/14/2025)      pregabalin (LYRICA) 25 MG capsule TAKE 1 CAPSULE (25 MG) BY MOUTH 2 TIMES DAILY 56 capsule 5    QUEtiapine Fumarate 150 MG TABS TAKE 1 TABLET BY MOUTH EVERY NIGHT AT BEDTIME 30 tablet 2    rosuvastatin (CRESTOR) 20 MG tablet TAKE 1 TABLET BY MOUTH DAILY 90 tablet 1    Semaglutide-Weight Management (WEGOVY) 0.5 MG/0.5ML pen Inject 0.5 mg subcutaneously once a week. 2 mL 0    Semaglutide-Weight Management (WEGOVY) 1 MG/0.5ML pen Inject 1 mg subcutaneously once a week. 2 mL 0    SENNA-TIME 8.6 MG tablet TAKE 2 TABLETS (17.2MG) BY MOUTH TWICE A  tablet 11    SUMAtriptan (IMITREX) 50 MG tablet Take 1 tablet (50 mg) by mouth at onset of headache for migraine May repeat in 2 hours. Max 4 tablets/24 hours.  12 tablet 3    varenicline (CHANTIX) 1 MG tablet Take 1 tablet (1 mg) by mouth 2 times daily. 60 tablet 8    vitamin D3 (CHOLECALCIFEROL) 50 mcg (2000 units) tablet Take 2 tablets (100 mcg) by mouth daily. 180 tablet 3    warfarin ANTICOAGULANT (COUMADIN) 5 MG tablet Take 10 mg - 12.5 mg by mouth daily as directed by your ACC Team based on INR Results. 200 tablet 1    zolpidem (AMBIEN) 5 MG tablet Take tablet by mouth 15 minutes prior to sleep, for Sleep Study (Patient not taking: Reported on 4/14/2025) 1 tablet 0         ALLERGIES:  Allergies   Allergen Reactions    Aspirin Shortness Of Breath    Bees Anaphylaxis    Lamotrigine Dizziness and Unknown    Latex Itching    Phenobarbital      aggression    Vistaril [Hydroxyzine] Other (See Comments)     Sluggish, unable to wake up    Gabapentin Rash       FAMILY HISTORY:  Family History   Problem Relation Age of Onset    Chronic Obstructive Pulmonary Disease Mother     Other - See Comments Father         estranged    Skin Cancer Father     Lung Cancer Maternal Grandfather 76    Other - See Comments Brother         Missing since 1992    Alcoholism Brother     Diabetes Sister     Depression Sister     Alcoholism Sister     No Known Problems Sister         Half sister       SOCIAL HISTORY:  Social History     Socioeconomic History    Marital status:    Tobacco Use    Smoking status: Former     Types: Cigarettes     Passive exposure: Never    Smokeless tobacco: Never    Tobacco comments:     Quit about 5 days ago per pt   Vaping Use    Vaping status: Never Used   Substance and Sexual Activity    Alcohol use: No     Comment: None.    Drug use: No    Sexual activity: Not Currently     Partners: Male     Social Drivers of Health     Financial Resource Strain: High Risk (1/3/2024)    Financial Resource Strain     Within the past 12 months, have you or your family members you live with been unable to get utilities (heat, electricity) when it was really needed?: Yes   Food  "Insecurity: Food Insecurity Present (6/15/2024)    Received from [employer]    Hunger Vital Sign     Worried About Running Out of Food in the Last Year: Sometimes true     Ran Out of Food in the Last Year: Sometimes true   Transportation Needs: No Transportation Needs (6/15/2024)    Received from [employer]    PRAPARE - Transportation     Lack of Transportation (Medical): No     Lack of Transportation (Non-Medical): No   Interpersonal Safety: Not At Risk (5/8/2025)    [facility]    Humiliation, Afraid, Rape, and Kick questionnaire     Fear of Current or Ex-Partner: No     Emotionally Abused: No     Physically Abused: No     Sexually Abused: No   Housing Stability: High Risk (6/15/2024)    Received from [employer]    Housing Stability Vital Sign     Unable to Pay for Housing in the Last Year: Yes     Unstable Housing in the Last Year: No       VITALS:  Patient Vitals for the past 24 hrs:   BP Temp Temp src Pulse Resp SpO2 Height Weight   05/11/25 2027 -- -- -- 80 24 95 % -- --   05/11/25 2019 -- -- -- 79 26 96 % -- --   05/11/25 2012 -- -- -- 78 28 95 % -- --   05/11/25 2004 -- -- -- 80 21 93 % -- --   05/11/25 1949 -- -- -- 79 23 93 % -- --   05/11/25 1843 118/73 97.7  F (36.5  C) Oral 92 20 96 % 1.676 m (5' 6\") (!) 158.8 kg (350 lb)       PHYSICAL EXAM    VITAL SIGNS: /73   Pulse 80   Temp 97.7  F (36.5  C) (Oral)   Resp 24   Ht 1.676 m (5' 6\")   Wt (!) 158.8 kg (350 lb)   LMP  (LMP Unknown)   SpO2 95%   BMI 56.49 kg/m    Physical Exam  Vitals and nursing note reviewed.   Constitutional:       General: She is not in acute distress.     Appearance: She is obese. She is not ill-appearing or diaphoretic.   HENT:      Head: Normocephalic and atraumatic.      Comments: No signs of trauma to head or neck.  Eyes:      General: No scleral icterus.        Right eye: No discharge.         Left eye: No discharge.      Pupils: Pupils are equal, round, and reactive to light. "   Cardiovascular:      Rate and Rhythm: Normal rate and regular rhythm.   Pulmonary:      Effort: Pulmonary effort is normal. No respiratory distress.      Breath sounds: No rales.      Comments: Slightly diminished posteriorly but good air movement bilaterally.  No significant wheeze.  No rales.  Abdominal:      General: There is no distension.      Palpations: Abdomen is soft.      Tenderness: There is no abdominal tenderness.   Musculoskeletal:         General: No swelling or deformity.      Cervical back: Neck supple. No rigidity.      Comments: No lower extremity edema bilaterally.   Skin:     General: Skin is warm and dry.      Capillary Refill: Capillary refill takes less than 2 seconds.      Findings: No bruising.   Neurological:      General: No focal deficit present.      Mental Status: She is alert and oriented to person, place, and time. Mental status is at baseline.   Psychiatric:      Comments: Labile mood, intermittently crying.  Anxious         LABS  Labs Ordered and Resulted from Time of ED Arrival to Time of ED Departure   INR - Abnormal       Result Value    INR 3.64 (*)     PT 35.6 (*)    COMPREHENSIVE METABOLIC PANEL - Abnormal    Sodium 140      Potassium 3.5      Carbon Dioxide (CO2) 20 (*)     Anion Gap 15      Urea Nitrogen 8.2      Creatinine 0.70      GFR Estimate >90      Calcium 8.7 (*)     Chloride 105      Glucose 107 (*)     Alkaline Phosphatase 53      AST 21      ALT 25      Protein Total 5.9 (*)     Albumin 3.6      Bilirubin Total 0.3     BLOOD GAS VENOUS - Abnormal    pH Venous 7.43      pCO2 Venous 41      pO2 Venous 45      Bicarbonate Venous 27      Base Excess/Deficit Venous 2.5      FIO2 21      Oxyhemoglobin Venous 81 (*)     O2 Sat, Venous 82.8 (*)    TROPONIN T, HIGH SENSITIVITY - Normal    Troponin T, High Sensitivity 10     LACTIC ACID WHOLE BLOOD - Normal    Lactic Acid 0.7     NT-PROBNP    NT-proBNP 99     CBC WITH PLATELETS AND DIFFERENTIAL    WBC Count 4.2      RBC  Count 4.84      Hemoglobin 13.4      Hematocrit 39.8      MCV 82      MCH 27.7      MCHC 33.7      RDW 13.7      Platelet Count 170      % Neutrophils 58      % Lymphocytes 31      % Monocytes 9      % Eosinophils 2      % Basophils 1      % Immature Granulocytes 0      NRBCs per 100 WBC 0      Absolute Neutrophils 2.5      Absolute Lymphocytes 1.3      Absolute Monocytes 0.4      Absolute Eosinophils 0.1      Absolute Basophils 0.0      Absolute Immature Granulocytes 0.0      Absolute NRBCs 0.0           RADIOLOGY  Chest XR,  PA & LAT   Final Result   IMPRESSION:       Heart size is normal. Lungs are clear bilaterally. Mediastinum and visualized bony structures are unremarkable.       Head CT w/o contrast   Final Result   IMPRESSION:   1.  No acute intracranial process.         I have independently reviewed the above image. See radiology report for detail.      EKG:    EKG obtained at 1847 and shows sinus rhythm rate 75, Qtc 422, compared to previous EKG on 4/22/25, similar to prior EKG. I have independently reviewed and interpreted today's EKG, pending Cardiologist read.       PROCEDURES:  N/A      MEDICATIONS GIVEN IN THE EMERGENCY:  Medications   LORazepam (ATIVAN) injection 1 mg (1 mg Intravenous Not Given 5/11/25 2033)   ipratropium - albuterol 0.5 mg/2.5 mg/3 mL (DUONEB) neb solution 3 mL (3 mLs Nebulization $Given 5/11/25 1941)   LORazepam (ATIVAN) tablet 1 mg (1 mg Oral $Given 5/11/25 2009)       NEW PRESCRIPTIONS STARTED AT TODAY'S ER VISIT  New Prescriptions    No medications on file        I, Luna Morales, am serving as a scribe to document services personally performed by Raine Almonte MD, based on my observations and the provider's statements to me.  I, Raine Almonte MD, attest that Luna Morales is acting in a scribe capacity, has observed my performance of the services and has documented them in accordance with my direction.     Raine Almonte MD  Emergency Medicine  [facility]  [place] EMERGENCY ROOM  [address] [place] [id number]  [phone]  Dept: [phone]            Raine Almonte MD  05/11/25 2239

## 2025-05-11 NOTE — ED NOTES
Bed: [facility]-06  Expected date:   Expected time:   Means of arrival:   Comments:  [place] 65F, SOB, getting duoneb

## 2025-05-12 ENCOUNTER — TELEPHONE (OUTPATIENT)
Dept: FAMILY MEDICINE | Facility: CLINIC | Age: 65
End: 2025-05-12
Payer: COMMERCIAL

## 2025-05-12 ENCOUNTER — DOCUMENTATION ONLY (OUTPATIENT)
Dept: ANTICOAGULATION | Facility: CLINIC | Age: 65
End: 2025-05-12
Payer: COMMERCIAL

## 2025-05-12 DIAGNOSIS — I26.99 PULMONARY EMBOLISM WITH INFARCTION (H): Primary | ICD-10-CM

## 2025-05-12 DIAGNOSIS — Z79.01 LONG TERM (CURRENT) USE OF ANTICOAGULANTS: ICD-10-CM

## 2025-05-12 DIAGNOSIS — F25.9 SCHIZOAFFECTIVE DISORDER, UNSPECIFIED TYPE (H): ICD-10-CM

## 2025-05-12 DIAGNOSIS — Z86.711 HISTORY OF PULMONARY EMBOLISM: ICD-10-CM

## 2025-05-12 RX ORDER — ARIPIPRAZOLE 10 MG/1
10 TABLET ORAL EVERY MORNING
Qty: 28 TABLET | Refills: 0 | Status: SHIPPED | OUTPATIENT
Start: 2025-05-12

## 2025-05-12 NOTE — TELEPHONE ENCOUNTER
General Call    Contacts       Contact Date/Time Type Contact Phone/Fax    05/12/2025 08:43 AM CDT Phone (Incoming) [id number] - [place], [place] - [address] [place] (Pharmacy) [phone]          Reason for Call:  Prescription Request    What are your questions or concerns:  Pharmacy calling to request dispense quantity change for Abilify to 28 tablets. Pharmacy pill packs for patient in 28 day supplies.

## 2025-05-12 NOTE — PROGRESS NOTES
ANTICOAGULATION  MANAGEMENT: Discharge Review    Valentina Olmedo chart reviewed for anticoagulation continuity of care    Emergency room visit on 5/11/25 for SOB and Anxiety.    Discharge disposition: Home    Results:    Recent labs: (last 7 days)     05/06/25  1225 05/11/25 2028   INR 4.5* 3.64*     Anticoagulation inpatient management:     Recommended 5 mg warfarin tomorrow, already took dose today.  Then resume normal dosing    Anticoagulation discharge instructions:     Warfarin dosing: decrease dose to 5 mg today; then resume normal dosing   Bridging: No   INR goal change: No      Medication changes affecting anticoagulation: No    Additional factors affecting anticoagulation: Yes: Increased Anxiety     PLAN     No adjustment to anticoagulation plan needed    Patient not contacted    Anticoagulation Calendar updated Has Home care visit tomorrow with INR    Melissa Montenegro, RN  5/12/2025  Anticoagulation Clinic  [facility] for routing messages: raquel MATHUR  [facility] patient phone line: [phone]

## 2025-05-12 NOTE — DISCHARGE INSTRUCTIONS
Your INR is 3.6 today, slightly elevated. Please see below instructions for your warfarin dosing:    Full warfarin instructions:  5/12/25: 5 mg; Otherwise 12.5 mg every Mon, Thu; 10 mg all other days   Next INR check:  5/13/2025       Make an appointment with your psychiatrist.  Follow up as previously scheduled with your primary care doctor and pulmonologist.  Your heart and lungs were checked today. Your head CT is for any intracranial bleeding.

## 2025-05-13 ENCOUNTER — TELEPHONE (OUTPATIENT)
Dept: ANTICOAGULATION | Facility: CLINIC | Age: 65
End: 2025-05-13
Payer: COMMERCIAL

## 2025-05-13 ENCOUNTER — TELEPHONE (OUTPATIENT)
Dept: FAMILY MEDICINE | Facility: CLINIC | Age: 65
End: 2025-05-13
Payer: COMMERCIAL

## 2025-05-13 ENCOUNTER — MEDICAL CORRESPONDENCE (OUTPATIENT)
Dept: HEALTH INFORMATION MANAGEMENT | Facility: CLINIC | Age: 65
End: 2025-05-13
Payer: COMMERCIAL

## 2025-05-13 ENCOUNTER — PATIENT OUTREACH (OUTPATIENT)
Dept: CARE COORDINATION | Facility: CLINIC | Age: 65
End: 2025-05-13
Payer: COMMERCIAL

## 2025-05-13 DIAGNOSIS — Z86.711 HISTORY OF PULMONARY EMBOLISM: ICD-10-CM

## 2025-05-13 DIAGNOSIS — I26.99 PULMONARY EMBOLISM WITH INFARCTION (H): Primary | ICD-10-CM

## 2025-05-13 DIAGNOSIS — Z79.01 LONG TERM (CURRENT) USE OF ANTICOAGULANTS: ICD-10-CM

## 2025-05-13 NOTE — ASSESSMENT & PLAN NOTE
INR remains within therapeutic range today with the addition of cefdinir.  
Indication for recent Emergency Room Visit. Notes reviewed. Today, she remains within goal range. She does not check her blood pressure regularly at home however does plan to obtain a cuff. She has had what sounds like some medication side effects with a higher dose of losartan, therefore given this and her normalization both here and in the ER I don't see any justification to alter her dose. Would encourage follow up with her PCP if blood pressure is trending upwards and she is in agreement with this.  
no

## 2025-05-13 NOTE — TELEPHONE ENCOUNTER
FYI - Status Update    Who is Calling: Rubia Kwong at [facility] health    Update: Wants Dr. Morin to know Patient went to ER Sunday 05/11/25 seeking Ativan. Nurse went to patients home today 05/13/25 but patient was not there and she is unable to find her, and can't get ahold of her, she will have to try again another day    Does caller want a call/response back: No

## 2025-05-13 NOTE — LETTER
Valentina Olmedo  [address]   [facility] [address]    Dear Valentina Olmedo,      I am a team member within the Connected [facility] Resource Center with [facility]. I recently tried to reach you to ensure you were doing well following a recent visit within our health system. I also wanted to take this chance to introduce Clinic Care Coordination.     Below is a description of Clinic Care Coordination and how this team can further assist you:       The Clinic Care Coordination team is made up of a Registered Nurse, , Financial Resource Worker, and a Community Health Worker who understand and can help navigate the health care system. The goal of clinic care coordination is to help you manage your health, improve access to care, and achieve optimal health outcomes. They work alongside your provider to assist you in determining your health and social needs, obtain health care and community resources, and provide you with necessary information and education. Clinic Care Coordination can work with you through any barriers and develop a care plan that helps coordinate and strengthen the relationship between you and your care team.    If you wish to connect with the Clinic Care Coordination Team, please let your [facility] Primary Care Provider or Clinic Care Team know and they can place a referral. The Clinic Care Coordination team will then reach out by phone to further support you.    We are focused on providing you with the highest-quality healthcare experience possible.    Sincerely,   Your care team with [facility]'s [address] [place] [address] ([phone]).

## 2025-05-13 NOTE — TELEPHONE ENCOUNTER
[facility] home care nurse left a VM message stating patient wasn't home when she went out for her visit today.  Unable to do INR. [facility] will try again tomorrow for Visit and INR.  Patient was seen in ER on Sunday and advised to do: Recommended 5 mg warfarin (Monday) tomorrow, already took dose today. Then resume normal dosing .  Patient also has a Provider visit scheduled tomorrow.  Note added to visit to do INR.    Melissa Montenegro RN  Anticoagulation Nurse - [facility], [place]

## 2025-05-13 NOTE — PROGRESS NOTES
[facility] Care Resource Center Contact  [facility]/Voicemail     Clinical Data: Care Coordination ED-sourced Outreach-     Outreach attempted x 2.  Left message on patient's voicemail, providing [facility]'s 24/7 scheduling and nurse triage phone number [phone] ([phone]) for questions/concerns and/or to schedule an appt with an [facility] provider.      Care Coordination introduction letter with explanation of Clinic Care Coordination services sent to patient via [employer]. Clinic Care Coordination services remain available via referral if needed.    Plan: General acute hospital will do no further outreaches at this time.       DAGOBERTO Lott  Connected Care Resource [place], [facility]    *Connected Care Resource Team does NOT follow patient ongoing. Referrals are identified based on internal discharge reports and the outreach is to ensure patient has an understanding of their discharge instructions.

## 2025-05-16 ENCOUNTER — MEDICAL CORRESPONDENCE (OUTPATIENT)
Dept: HEALTH INFORMATION MANAGEMENT | Facility: CLINIC | Age: 65
End: 2025-05-16
Payer: COMMERCIAL

## 2025-05-19 ENCOUNTER — MEDICAL CORRESPONDENCE (OUTPATIENT)
Dept: HEALTH INFORMATION MANAGEMENT | Facility: CLINIC | Age: 65
End: 2025-05-19
Payer: COMMERCIAL

## 2025-05-19 ENCOUNTER — ANTICOAGULATION THERAPY VISIT (OUTPATIENT)
Dept: ANTICOAGULATION | Facility: CLINIC | Age: 65
End: 2025-05-19
Payer: COMMERCIAL

## 2025-05-19 ENCOUNTER — TELEPHONE (OUTPATIENT)
Dept: FAMILY MEDICINE | Facility: CLINIC | Age: 65
End: 2025-05-19
Payer: COMMERCIAL

## 2025-05-19 DIAGNOSIS — R52 PAIN: ICD-10-CM

## 2025-05-19 DIAGNOSIS — E53.8 VITAMIN B12 DEFICIENCY (NON ANEMIC): Primary | ICD-10-CM

## 2025-05-19 DIAGNOSIS — I26.99 PULMONARY EMBOLISM WITH INFARCTION (H): Primary | ICD-10-CM

## 2025-05-19 DIAGNOSIS — F25.9 SCHIZOAFFECTIVE DISORDER, UNSPECIFIED TYPE (H): ICD-10-CM

## 2025-05-19 DIAGNOSIS — Z79.01 LONG TERM (CURRENT) USE OF ANTICOAGULANTS: ICD-10-CM

## 2025-05-19 DIAGNOSIS — Z86.711 HISTORY OF PULMONARY EMBOLISM: ICD-10-CM

## 2025-05-19 LAB
INR (EXTERNAL): 2.6 (ref 0.9–1.1)
INR (EXTERNAL): 4.2 (ref 0.9–1.1)
INR (EXTERNAL): 5.4 (ref 0.9–1.1)

## 2025-05-19 RX ORDER — ARIPIPRAZOLE 15 MG/1
15 TABLET ORAL EVERY MORNING
Qty: 30 TABLET | Refills: 1 | Status: SHIPPED | OUTPATIENT
Start: 2025-05-19

## 2025-05-19 RX ORDER — ACETAMINOPHEN 500 MG
500-1000 TABLET ORAL EVERY 8 HOURS PRN
Qty: 120 TABLET | Refills: 1 | Status: SHIPPED | OUTPATIENT
Start: 2025-05-19

## 2025-05-19 RX ORDER — LANOLIN ALCOHOL/MO/W.PET/CERES
1000 CREAM (GRAM) TOPICAL DAILY
Qty: 30 TABLET | Refills: 1 | Status: SHIPPED | OUTPATIENT
Start: 2025-05-19

## 2025-05-19 NOTE — PROGRESS NOTES
ANTICOAGULATION MANAGEMENT     Valentina Olmedo 65 year old female is on warfarin with therapeutic INR result. (Goal INR 2.0-3.0)    Recent labs: (last 7 days)     05/19/25  1253   INR 2.6*   5/15- 5.4 INR  5/16- 4.2 INR      ASSESSMENT     Source(s): Chart Review and Home Care/Facility Nurse     Warfarin doses taken: Missed dose(s) may be affecting INR, hospital discharged patient on 7.5 mg daily.  Diet: Intake is poor, this is patients baseline  Medication/supplement changes: busPIRone (BUSPAR) 15 MG tablet,  semaglutide-weight management (WEGOVY) 0.5 MG/0.5ML pen injection,   umeclidinium (INCRUSE ELLIPTA) 62.5 MCG/ACT inhaler   New illness, injury, or hospitalization: Yes: 5/13-5/15/25 for mental health concerns  Patient reporting some nausea  Signs or symptoms of bleeding or clotting: No  Previous result: Supratherapeutic  Additional findings: Patient to continue hospital discharge decreased dose of 7.5 mg daily, patient is 2.6 with 2 day holds for supratherapeutic INR and so this is appropriate dose adjustment. Will recheck in 3 days to keep close eye on INR.       PLAN     Recommended plan for temporary change(s) affecting INR     Dosing Instructions: Continue your current warfarin dose with next INR in 3 days       Summary  As of 5/19/2025      Full warfarin instructions:  7.5 mg every day   Next INR check:  5/22/2025               Telephone call with [place] home care nurse who verbalizes understanding and agrees to plan    Orders given to  Homecare nurse/facility to recheck    Education provided: Please call back if any changes to your diet, medications or how you've been taking warfarin    Plan made per [facility] anticoagulation protocol    Kyleigh Jimenes, RN  5/19/2025  Anticoagulation Clinic  [employer] for routing messages: raquel MATHUR  [facility] patient phone line: [phone]        _______________________________________________________________________     Anticoagulation Episode Summary       Current INR  goal:  2.0-3.0   TTR:  52.4% (1 y)   Target end date:  Indefinite   Send INR reminders to:  PIPO MATHUR    Indications    History of pulmonary embolism (Resolved) [Z86.711]  Pulmonary embolism with infarction (H) [I26.99]  Long term (current) use of anticoagulants [Z79.01]  History of pulmonary embolism [Z86.711]             Comments:  --             Anticoagulation Care Providers       Provider Role Specialty Phone number    Promise Vanegas MD Referring Family Medicine [phone]    Donald Rooney MD Referring Internal Medicine [phone]    Jose Maria Morin MD Referring Family Medicine [phone]

## 2025-05-19 NOTE — TELEPHONE ENCOUNTER
Home Care is calling regarding an established patient with [facility].  Requesting orders from: Jose Maria Morin  PROVIDER AUTHORIZATION REQUIRED: RN unable to provide verbal approval for all orders.  See below for additional information.  RN will contact Home care with information after provider review.  Is this a request for a temporary pause in the home care episode?  No    Orders Requested    Skilled Nursing  Request for resumption in care.   1x/ week for 8 weeks  RN gave verbal order: Yes      Medication Updates/Clarification  -acetaminophen 500 mg, 1-2 tabs q8h PRN - patient reports taking PRN, can this be added to current med list?  -cyanocobalamin 1000 mcg daily - patient reports taking daily, can this be added to current med list?  -Abilify was increased to 15 mg daily during recent hospitalization, can we update this on current medication list?    Phone number Home Care can be reached at: [phone]  Okay to leave a detailed message?: Yes  For any immediate needs, please contact [facility] office at [phone]    Contacts       Contact Date/Time Type Contact Phone/Fax    05/19/2025 03:50 PM CDT Phone (Incoming) [facility] (Home Care) [phone]     Confidential VM          Debora Mitchell RN

## 2025-05-20 NOTE — CONFIDENTIAL NOTE
Agree with home care      Meds added/adjusted on med list to reflect changes during recent hospitalization.

## 2025-05-20 NOTE — TELEPHONE ENCOUNTER
Called and left detailed message for AMPARO Merritt with [facility], with message from Dr. Morin, as below.        Paula Rollins RN  [facility]

## 2025-05-22 ENCOUNTER — ANTICOAGULATION THERAPY VISIT (OUTPATIENT)
Dept: ANTICOAGULATION | Facility: CLINIC | Age: 65
End: 2025-05-22

## 2025-05-22 ENCOUNTER — OFFICE VISIT (OUTPATIENT)
Dept: FAMILY MEDICINE | Facility: CLINIC | Age: 65
End: 2025-05-22
Payer: COMMERCIAL

## 2025-05-22 VITALS
SYSTOLIC BLOOD PRESSURE: 127 MMHG | RESPIRATION RATE: 20 BRPM | TEMPERATURE: 97.5 F | OXYGEN SATURATION: 94 % | WEIGHT: 293 LBS | DIASTOLIC BLOOD PRESSURE: 82 MMHG | BODY MASS INDEX: 54.64 KG/M2 | HEART RATE: 110 BPM

## 2025-05-22 DIAGNOSIS — Z79.01 LONG TERM (CURRENT) USE OF ANTICOAGULANTS: ICD-10-CM

## 2025-05-22 DIAGNOSIS — J44.9 CHRONIC OBSTRUCTIVE PULMONARY DISEASE, UNSPECIFIED COPD TYPE (H): ICD-10-CM

## 2025-05-22 DIAGNOSIS — F25.1 SCHIZOAFFECTIVE DISORDER, DEPRESSIVE TYPE (H): ICD-10-CM

## 2025-05-22 DIAGNOSIS — I26.99 PULMONARY EMBOLISM WITH INFARCTION (H): Primary | ICD-10-CM

## 2025-05-22 DIAGNOSIS — E66.813 CLASS 3 SEVERE OBESITY DUE TO EXCESS CALORIES WITH SERIOUS COMORBIDITY AND BODY MASS INDEX (BMI) OF 50.0 TO 59.9 IN ADULT (H): ICD-10-CM

## 2025-05-22 DIAGNOSIS — Z86.711 HISTORY OF PULMONARY EMBOLISM: ICD-10-CM

## 2025-05-22 DIAGNOSIS — F33.1 MAJOR DEPRESSIVE DISORDER, RECURRENT EPISODE, MODERATE (H): Primary | ICD-10-CM

## 2025-05-22 DIAGNOSIS — J45.30 MILD PERSISTENT ASTHMA WITHOUT COMPLICATION: ICD-10-CM

## 2025-05-22 LAB — INR (EXTERNAL): 3.2 (ref 0.9–1.1)

## 2025-05-22 RX ORDER — PREDNISONE 10 MG/1
TABLET ORAL
Qty: 32 TABLET | Refills: 0 | Status: CANCELLED | OUTPATIENT
Start: 2025-05-22 | End: 2025-06-07

## 2025-05-22 RX ORDER — BUDESONIDE AND FORMOTEROL FUMARATE DIHYDRATE 160; 4.5 UG/1; UG/1
2 AEROSOL RESPIRATORY (INHALATION) 2 TIMES DAILY
Qty: 10.2 G | Refills: 2 | Status: SHIPPED | OUTPATIENT
Start: 2025-05-22

## 2025-05-22 RX ORDER — PREDNISONE 10 MG/1
TABLET ORAL
Qty: 32 TABLET | Refills: 0 | Status: SHIPPED | OUTPATIENT
Start: 2025-05-22 | End: 2025-06-07

## 2025-05-22 ASSESSMENT — ANXIETY QUESTIONNAIRES
2. NOT BEING ABLE TO STOP OR CONTROL WORRYING: NEARLY EVERY DAY
GAD7 TOTAL SCORE: 16
6. BECOMING EASILY ANNOYED OR IRRITABLE: MORE THAN HALF THE DAYS
GAD7 TOTAL SCORE: 16
7. FEELING AFRAID AS IF SOMETHING AWFUL MIGHT HAPPEN: MORE THAN HALF THE DAYS
GAD7 TOTAL SCORE: 16
5. BEING SO RESTLESS THAT IT IS HARD TO SIT STILL: SEVERAL DAYS
1. FEELING NERVOUS, ANXIOUS, OR ON EDGE: MORE THAN HALF THE DAYS
4. TROUBLE RELAXING: NEARLY EVERY DAY
7. FEELING AFRAID AS IF SOMETHING AWFUL MIGHT HAPPEN: MORE THAN HALF THE DAYS
8. IF YOU CHECKED OFF ANY PROBLEMS, HOW DIFFICULT HAVE THESE MADE IT FOR YOU TO DO YOUR WORK, TAKE CARE OF THINGS AT HOME, OR GET ALONG WITH OTHER PEOPLE?: VERY DIFFICULT
IF YOU CHECKED OFF ANY PROBLEMS ON THIS QUESTIONNAIRE, HOW DIFFICULT HAVE THESE PROBLEMS MADE IT FOR YOU TO DO YOUR WORK, TAKE CARE OF THINGS AT HOME, OR GET ALONG WITH OTHER PEOPLE: VERY DIFFICULT
3. WORRYING TOO MUCH ABOUT DIFFERENT THINGS: NEARLY EVERY DAY

## 2025-05-22 ASSESSMENT — PATIENT HEALTH QUESTIONNAIRE - PHQ9
SUM OF ALL RESPONSES TO PHQ QUESTIONS 1-9: 15
10. IF YOU CHECKED OFF ANY PROBLEMS, HOW DIFFICULT HAVE THESE PROBLEMS MADE IT FOR YOU TO DO YOUR WORK, TAKE CARE OF THINGS AT HOME, OR GET ALONG WITH OTHER PEOPLE: VERY DIFFICULT
SUM OF ALL RESPONSES TO PHQ QUESTIONS 1-9: 15

## 2025-05-22 NOTE — ASSESSMENT & PLAN NOTE
Weight is down in the context of recent struggles.  She is no longer on Wegovy.  I have had a couple of patients show deterioration of mental health on GLP-1 medications.  He did not have any specific side effects and so she could try the medicine again at some point if we arrive at a time of relative mental health stability.

## 2025-05-22 NOTE — ASSESSMENT & PLAN NOTE
Common thread of emergency department visits over the past month has been shortness of breath.  CT scans in April did not show abnormalities.  She believes that her symptoms actually get a bit better with albuterol.  She has been taking her Symbicort once per day (unclear whether or not her home care nurses were aware).  Have asked her to take Symbicort twice a day.  Physical exam reassuring today.  Nevertheless we will try a burst of steroids with taper as she states that these have also helped her get a bit better.  Comorbid mental health deterioration.

## 2025-05-22 NOTE — ASSESSMENT & PLAN NOTE
Hospital follow-up.  Multiple emergency department visits in the past 4 to 6 weeks.  Patient states that she is crying frequently.  She has passive suicidal ideation.  Notes reviewed from outside hospital.  She was admitted (voluntarily) for suicidal ideation.  Abilify adjusted upward.  Symptoms were documented as being improved.  Multiple emergency department visit notes reviewed.  CT scans, ECGs without abnormalities.  Concurrent shortness of breath which is possibly multifactorial.  - Continue Abilify at current dose.  - Treat shortness of breath symptoms I think will greatly alleviate symptoms of anxiety.  - Psychiatrist appointment first week of June.  I am hesitant to adjust upward her medicines further as they are nearly maximum.  I did consider offering a benzodiazepine.  However, the patient was unable to clearly state that these medicines helped with her symptoms of shortness of breath and acute mental health symptoms.  There are emergency department visit notes suggesting a manipulative interaction with respect to getting refills of these medicines.

## 2025-05-22 NOTE — PROGRESS NOTES
ANTICOAGULATION MANAGEMENT     Valentina Olmedo 65 year old female is on warfarin with supratherapeutic INR result. (Goal INR 2.0-3.0)    Recent labs: (last 7 days)     05/22/25  0000   INR 3.2*       ASSESSMENT     Source(s): Chart Review and Home Care/Facility Nurse     Warfarin doses taken: Warfarin taken as instructed  Diet: No new diet changes identified -noted that pt stopped premier protein drinks last month.   Medication/supplement changes: Noted medication changes while inpatient 5/13/25-5/15/25 in last [facility] encounter. Not anticipated to impact INR   New illness, injury, or hospitalization: Yes: 5/15/25-5/17/25 for mental health concerns. Pt endorse nausea and diarrhea, these are chronic for pt.   Signs or symptoms of bleeding or clotting: No  Previous result: Therapeutic last visit; previously outside of goal range  Additional findings: None       PLAN     Recommended plan for temporary change(s) affecting INR     Dosing Instructions: decrease your warfarin dose (4.8% change) with next INR in 1 week       Summary  As of 5/22/2025      Full warfarin instructions:  5 mg every Thu; 7.5 mg all other days   Next INR check:  5/29/2025               Telephone call with [place] home care nurse who verbalizes understanding and agrees to plan    Orders given to  Homecare nurse/facility to recheck    Education provided: Goal range and lab monitoring: goal range and significance of current result, Importance of therapeutic range, and Importance of following up at instructed interval    Plan made with [facility] Pharmacist Paula Vazquez, RN  5/22/2025  Anticoagulation Clinic  [employer] for routing messages: raquel MATHUR  [facility] patient phone line: [phone]        _______________________________________________________________________     Anticoagulation Episode Summary       Current INR goal:  2.0-3.0   TTR:  52.1% (1 y)   Target end date:  Indefinite   Send INR reminders to:  PIPO MATHUR     Indications    History of pulmonary embolism (Resolved) [Z86.711]  Pulmonary embolism with infarction (H) [I26.99]  Long term (current) use of anticoagulants [Z79.01]  History of pulmonary embolism [Z86.711]             Comments:  --             Anticoagulation Care Providers       Provider Role Specialty Phone number    Promise Vanegas MD Referring Family Medicine [phone]    Donald Rooney MD Referring Internal Medicine [phone]    Jose Maria Morin MD Referring Family Medicine [phone]

## 2025-05-22 NOTE — ASSESSMENT & PLAN NOTE
Currently struggling with severe depression.  I am concerned that shaking may be a result of health medicines.  This was not addressed during today's visit.  For now, current therapies with close follow-up.  She was scheduled for an appointment next week.

## 2025-05-22 NOTE — PROGRESS NOTES
Assessment & Plan   Problem List Items Addressed This Visit       Chronic obstructive pulmonary disease (H)    Common thread of emergency department visits over the past month has been shortness of breath.  CT scans in April did not show abnormalities.  She believes that her symptoms actually get a bit better with albuterol.  She has been taking her Symbicort once per day (unclear whether or not her home care nurses were aware).  Have asked her to take Symbicort twice a day.  Physical exam reassuring today.  Nevertheless we will try a burst of steroids with taper as she states that these have also helped her get a bit better.  Comorbid mental health deterioration.         Relevant Medications    budesonide-formoterol (SYMBICORT/BREYNA) 160-4.5 MCG/ACT inhaler    predniSONE (DELTASONE) 10 MG tablet    Class 3 severe obesity due to excess calories with serious comorbidity and body mass index (BMI) of 50.0 to 59.9 in adult (H)    Weight is down in the context of recent struggles.  She is no longer on Wegovy.  I have had a couple of patients show deterioration of mental health on GLP-1 medications.  He did not have any specific side effects and so she could try the medicine again at some point if we arrive at a time of relative mental health stability.         Major depressive disorder, recurrent episode, moderate (H) - Primary    Hospital follow-up.  Multiple emergency department visits in the past 4 to 6 weeks.  Patient states that she is crying frequently.  She has passive suicidal ideation.  Notes reviewed from outside hospital.  She was admitted (voluntarily) for suicidal ideation.  Abilify adjusted upward.  Symptoms were documented as being improved.  Multiple emergency department visit notes reviewed.  CT scans, ECGs without abnormalities.  Concurrent shortness of breath which is possibly multifactorial.  - Continue Abilify at current dose.  - Treat shortness of breath symptoms I think will greatly alleviate  symptoms of anxiety.  - Psychiatrist appointment first week of June.  I am hesitant to adjust upward her medicines further as they are nearly maximum.  I did consider offering a benzodiazepine.  However, the patient was unable to clearly state that these medicines helped with her symptoms of shortness of breath and acute mental health symptoms.  There are emergency department visit notes suggesting a manipulative interaction with respect to getting refills of these medicines.         Schizoaffective disorder, depressive type (H)    Currently struggling with severe depression.  I am concerned that shaking may be a result of health medicines.  This was not addressed during today's visit.  For now, current therapies with close follow-up.  She was scheduled for an appointment next week.          Other Visit Diagnoses         Mild persistent asthma without complication        Relevant Medications    budesonide-formoterol (SYMBICORT/BREYNA) 160-4.5 MCG/ACT inhaler    predniSONE (DELTASONE) 10 MG tablet                MED REC REQUIRED  Post Medication Reconciliation Status:  Discharge medications reconciled and changed, see notes/orders  Depression Screening Follow Up        5/22/2025    11:41 AM   PHQ   PHQ-9 Total Score 15    Q9: Thoughts of better off dead/self-harm past 2 weeks Nearly every day   F/U: Thoughts of suicide or self-harm Yes   F/U: Self harm-plan No   F/U: Self-harm action No   F/U: Safety concerns No       Patient-reported         Follow Up Actions Taken  Crisis resource information provided in the After Visit Summary  Referred patient back to mental health provider    Discussed the following ways the patient can remain in a safe environment:  be around others    The longitudinal plan of care for the diagnosis(es)/condition(s) as documented were addressed during this visit. Due to the added complexity in care, I will continue to support Valentina in the subsequent management and with ongoing continuity of  "care.    Subjective   Valentina is a 65 year old, presenting for the following health issues:  Hospital F/U        5/22/2025    11:53 AM   Additional Questions   Roomed by xl   Accompanied by spouse     Hospital follow up:   - nausea- \"real bad.\"  Shaking real bad.    - her last wegovy injection was >7 days ago.    - depression symptoms continue to be worse.  \"I keep crying and crying and crying,.\"   - not sure that abilify dose adjustment has worked   - passive SI continues. \"It bothers me.\"   - SOB: ongoing.     History of Present Illness       Mental Health Follow-up:  Patient presents to follow-up on Depression & Anxiety.Patient's depression since last visit has been:  Medium  The patient is having other symptoms associated with depression.  Patient's anxiety since last visit has been:  Medium  The patient is having other symptoms associated with anxiety.  Any significant life events: relationship concerns, grief or loss and health concerns  Patient is feeling anxious or having panic attacks.  Patient has no concerns about alcohol or drug use.    She eats 2-3 servings of fruits and vegetables daily.She consumes 1 sweetened beverage(s) daily.She exercises with enough effort to increase her heart rate 9 or less minutes per day.  She exercises with enough effort to increase her heart rate 5 days per week.   She is taking medications regularly.        Hospital Follow-up Visit:    Hospital/Nursing Home/IP Rehab Facility: [facility]   Date of Admission: 5/13/25  Date of Discharge: 5/16/25  Reason(s) for Admission: Depression and Anxiety  Was the patient in the ICU or did the patient experience delirium during hospitalization?  No  Do you have any other stressors you would like to discuss with your provider? Relationship Concerns, Job Concerns, Financial Concerns, Housing Concerns, Transportation Concerns, Grief or Loss, and Health Concerns    Problems taking medications regularly:  None  Medication changes " since discharge: none  Problems adhering to non-medication therapy:  None    Summary of hospitalization:  CareEverywhere information obtained and reviewed  Diagnostic Tests/Treatments reviewed.  Follow up needed: none  Other Healthcare Providers Involved in Patient s Care:         Pulmonology, psychiatry  Update since discharge: stable.       Plan of care communicated with patient and family           Objective    /82 (BP Location: Left arm, Patient Position: Sitting, Cuff Size: Adult Large)   Pulse 110   Temp 97.5  F (36.4  C) (Oral)   Resp 20   Wt (!) 153.5 kg (338 lb 8 oz)   LMP  (LMP Unknown)   SpO2 94%   BMI 54.64 kg/m    Body mass index is 54.64 kg/m .  Physical Exam   General: Tearful.  Appears uncomfortable.  Cardiac: Regular rate and rhythm, normal S1/S2  Respiratory: Somewhat decreased inspiratory and expiratory phases.  No wheezes.  No rhonchi.  Psych: Tearful and anxious affect.  Central thinking.  Response time is slow.      Signed Electronically by: Jose Maria Morin MD

## 2025-05-23 ENCOUNTER — MEDICAL CORRESPONDENCE (OUTPATIENT)
Dept: HEALTH INFORMATION MANAGEMENT | Facility: CLINIC | Age: 65
End: 2025-05-23
Payer: COMMERCIAL

## 2025-05-27 ENCOUNTER — TELEPHONE (OUTPATIENT)
Dept: BEHAVIORAL HEALTH | Facility: CLINIC | Age: 65
End: 2025-05-27

## 2025-05-27 ENCOUNTER — OFFICE VISIT (OUTPATIENT)
Dept: FAMILY MEDICINE | Facility: CLINIC | Age: 65
End: 2025-05-27
Payer: COMMERCIAL

## 2025-05-27 ENCOUNTER — RESULTS FOLLOW-UP (OUTPATIENT)
Dept: PHARMACY | Facility: HOSPITAL | Age: 65
End: 2025-05-27

## 2025-05-27 ENCOUNTER — HOSPITAL ENCOUNTER (EMERGENCY)
Facility: HOSPITAL | Age: 65
Discharge: OTHER INSTITUTION | End: 2025-05-28
Attending: EMERGENCY MEDICINE | Admitting: EMERGENCY MEDICINE
Payer: COMMERCIAL

## 2025-05-27 ENCOUNTER — MEDICAL CORRESPONDENCE (OUTPATIENT)
Dept: HEALTH INFORMATION MANAGEMENT | Facility: CLINIC | Age: 65
End: 2025-05-27

## 2025-05-27 ENCOUNTER — APPOINTMENT (OUTPATIENT)
Dept: RADIOLOGY | Facility: HOSPITAL | Age: 65
End: 2025-05-27
Attending: EMERGENCY MEDICINE
Payer: COMMERCIAL

## 2025-05-27 VITALS
DIASTOLIC BLOOD PRESSURE: 106 MMHG | RESPIRATION RATE: 20 BRPM | SYSTOLIC BLOOD PRESSURE: 153 MMHG | BODY MASS INDEX: 47.09 KG/M2 | TEMPERATURE: 98.1 F | OXYGEN SATURATION: 97 % | WEIGHT: 293 LBS | HEART RATE: 89 BPM | HEIGHT: 66 IN

## 2025-05-27 DIAGNOSIS — F41.9 ANXIETY: ICD-10-CM

## 2025-05-27 DIAGNOSIS — F32.A DEPRESSION, UNSPECIFIED DEPRESSION TYPE: ICD-10-CM

## 2025-05-27 DIAGNOSIS — R45.851 SUICIDAL THOUGHTS: ICD-10-CM

## 2025-05-27 DIAGNOSIS — F25.1 SCHIZOAFFECTIVE DISORDER, DEPRESSIVE TYPE (H): Primary | ICD-10-CM

## 2025-05-27 DIAGNOSIS — N30.00 ACUTE CYSTITIS WITHOUT HEMATURIA: ICD-10-CM

## 2025-05-27 LAB
ANION GAP SERPL CALCULATED.3IONS-SCNC: 13 MMOL/L (ref 7–15)
BASOPHILS # BLD AUTO: 0 10E3/UL (ref 0–0.2)
BASOPHILS NFR BLD AUTO: 1 %
BUN SERPL-MCNC: 8.8 MG/DL (ref 8–23)
CALCIUM SERPL-MCNC: 9.3 MG/DL (ref 8.8–10.4)
CHLORIDE SERPL-SCNC: 103 MMOL/L (ref 98–107)
CREAT SERPL-MCNC: 0.73 MG/DL (ref 0.51–0.95)
EGFRCR SERPLBLD CKD-EPI 2021: >90 ML/MIN/1.73M2
EOSINOPHIL # BLD AUTO: 0 10E3/UL (ref 0–0.7)
EOSINOPHIL NFR BLD AUTO: 1 %
ERYTHROCYTE [DISTWIDTH] IN BLOOD BY AUTOMATED COUNT: 13.5 % (ref 10–15)
GLUCOSE SERPL-MCNC: 116 MG/DL (ref 70–99)
HCO3 SERPL-SCNC: 24 MMOL/L (ref 22–29)
HCT VFR BLD AUTO: 44.1 % (ref 35–47)
HGB BLD-MCNC: 14.5 G/DL (ref 11.7–15.7)
IMM GRANULOCYTES # BLD: 0 10E3/UL
IMM GRANULOCYTES NFR BLD: 0 %
INR PPP: 2.96 (ref 0.85–1.15)
LYMPHOCYTES # BLD AUTO: 1.6 10E3/UL (ref 0.8–5.3)
LYMPHOCYTES NFR BLD AUTO: 27 %
MCH RBC QN AUTO: 27.2 PG (ref 26.5–33)
MCHC RBC AUTO-ENTMCNC: 32.9 G/DL (ref 31.5–36.5)
MCV RBC AUTO: 83 FL (ref 78–100)
MONOCYTES # BLD AUTO: 0.4 10E3/UL (ref 0–1.3)
MONOCYTES NFR BLD AUTO: 7 %
NEUTROPHILS # BLD AUTO: 3.9 10E3/UL (ref 1.6–8.3)
NEUTROPHILS NFR BLD AUTO: 65 %
NRBC # BLD AUTO: 0 10E3/UL
NRBC BLD AUTO-RTO: 0 /100
PLATELET # BLD AUTO: 237 10E3/UL (ref 150–450)
POTASSIUM SERPL-SCNC: 3.7 MMOL/L (ref 3.4–5.3)
PROTHROMBIN TIME: 30.7 SECONDS (ref 11.8–14.8)
RBC # BLD AUTO: 5.34 10E6/UL (ref 3.8–5.2)
SODIUM SERPL-SCNC: 140 MMOL/L (ref 135–145)
TROPONIN T SERPL HS-MCNC: 10 NG/L
TROPONIN T SERPL HS-MCNC: 11 NG/L
WBC # BLD AUTO: 5.9 10E3/UL (ref 4–11)

## 2025-05-27 PROCEDURE — 3080F DIAST BP >= 90 MM HG: CPT | Performed by: FAMILY MEDICINE

## 2025-05-27 PROCEDURE — 80048 BASIC METABOLIC PNL TOTAL CA: CPT | Performed by: EMERGENCY MEDICINE

## 2025-05-27 PROCEDURE — 99215 OFFICE O/P EST HI 40 MIN: CPT | Performed by: FAMILY MEDICINE

## 2025-05-27 PROCEDURE — 250N000011 HC RX IP 250 OP 636: Performed by: EMERGENCY MEDICINE

## 2025-05-27 PROCEDURE — 85610 PROTHROMBIN TIME: CPT | Performed by: EMERGENCY MEDICINE

## 2025-05-27 PROCEDURE — 96374 THER/PROPH/DIAG INJ IV PUSH: CPT

## 2025-05-27 PROCEDURE — 250N000013 HC RX MED GY IP 250 OP 250 PS 637: Performed by: EMERGENCY MEDICINE

## 2025-05-27 PROCEDURE — 84484 ASSAY OF TROPONIN QUANT: CPT | Performed by: EMERGENCY MEDICINE

## 2025-05-27 PROCEDURE — G2211 COMPLEX E/M VISIT ADD ON: HCPCS | Performed by: FAMILY MEDICINE

## 2025-05-27 PROCEDURE — 71045 X-RAY EXAM CHEST 1 VIEW: CPT

## 2025-05-27 PROCEDURE — 85014 HEMATOCRIT: CPT | Performed by: EMERGENCY MEDICINE

## 2025-05-27 PROCEDURE — 36415 COLL VENOUS BLD VENIPUNCTURE: CPT | Performed by: EMERGENCY MEDICINE

## 2025-05-27 PROCEDURE — 93005 ELECTROCARDIOGRAM TRACING: CPT | Performed by: EMERGENCY MEDICINE

## 2025-05-27 PROCEDURE — 99285 EMERGENCY DEPT VISIT HI MDM: CPT | Mod: 25

## 2025-05-27 PROCEDURE — 3077F SYST BP >= 140 MM HG: CPT | Performed by: FAMILY MEDICINE

## 2025-05-27 RX ORDER — PREGABALIN 25 MG/1
25 CAPSULE ORAL 2 TIMES DAILY
Status: DISCONTINUED | OUTPATIENT
Start: 2025-05-27 | End: 2025-05-28 | Stop reason: HOSPADM

## 2025-05-27 RX ORDER — LORAZEPAM 0.5 MG/1
0.5 TABLET ORAL EVERY 6 HOURS PRN
Status: DISCONTINUED | OUTPATIENT
Start: 2025-05-27 | End: 2025-05-28 | Stop reason: HOSPADM

## 2025-05-27 RX ORDER — ROSUVASTATIN CALCIUM 10 MG/1
20 TABLET, COATED ORAL AT BEDTIME
Status: DISCONTINUED | OUTPATIENT
Start: 2025-05-27 | End: 2025-05-28 | Stop reason: HOSPADM

## 2025-05-27 RX ORDER — LEVOTHYROXINE SODIUM 100 UG/1
300 TABLET ORAL
Status: DISCONTINUED | OUTPATIENT
Start: 2025-05-28 | End: 2025-05-28 | Stop reason: HOSPADM

## 2025-05-27 RX ORDER — ARIPIPRAZOLE 15 MG/1
15 TABLET ORAL EVERY MORNING
Status: DISCONTINUED | OUTPATIENT
Start: 2025-05-28 | End: 2025-05-28 | Stop reason: HOSPADM

## 2025-05-27 RX ORDER — WARFARIN SODIUM 5 MG/1
TABLET ORAL
Status: ON HOLD | COMMUNITY

## 2025-05-27 RX ORDER — ACETAMINOPHEN 325 MG/1
975 TABLET ORAL ONCE
Status: COMPLETED | OUTPATIENT
Start: 2025-05-27 | End: 2025-05-27

## 2025-05-27 RX ORDER — BUDESONIDE AND FORMOTEROL FUMARATE DIHYDRATE 160; 4.5 UG/1; UG/1
2 AEROSOL RESPIRATORY (INHALATION) 2 TIMES DAILY
Status: DISCONTINUED | OUTPATIENT
Start: 2025-05-27 | End: 2025-05-28 | Stop reason: HOSPADM

## 2025-05-27 RX ORDER — LORAZEPAM 2 MG/ML
0.5 INJECTION INTRAMUSCULAR ONCE
Status: COMPLETED | OUTPATIENT
Start: 2025-05-27 | End: 2025-05-27

## 2025-05-27 RX ORDER — WARFARIN SODIUM 5 MG/1
5 TABLET ORAL WEEKLY
Status: DISCONTINUED | OUTPATIENT
Start: 2025-05-29 | End: 2025-05-28 | Stop reason: HOSPADM

## 2025-05-27 RX ORDER — CARBAMAZEPINE 200 MG/1
200 TABLET ORAL 2 TIMES DAILY
Status: DISCONTINUED | OUTPATIENT
Start: 2025-05-27 | End: 2025-05-28 | Stop reason: HOSPADM

## 2025-05-27 RX ORDER — DULOXETIN HYDROCHLORIDE 60 MG/1
120 CAPSULE, DELAYED RELEASE ORAL DAILY
Status: DISCONTINUED | OUTPATIENT
Start: 2025-05-28 | End: 2025-05-28 | Stop reason: HOSPADM

## 2025-05-27 RX ORDER — PANTOPRAZOLE SODIUM 40 MG/1
40 TABLET, DELAYED RELEASE ORAL
Status: DISCONTINUED | OUTPATIENT
Start: 2025-05-28 | End: 2025-05-28 | Stop reason: HOSPADM

## 2025-05-27 RX ORDER — PRAZOSIN HYDROCHLORIDE 2 MG/1
2 CAPSULE ORAL AT BEDTIME
Status: DISCONTINUED | OUTPATIENT
Start: 2025-05-27 | End: 2025-05-28 | Stop reason: HOSPADM

## 2025-05-27 RX ORDER — ALBUTEROL SULFATE 90 UG/1
2 INHALANT RESPIRATORY (INHALATION) EVERY 4 HOURS PRN
Status: DISCONTINUED | OUTPATIENT
Start: 2025-05-27 | End: 2025-05-28 | Stop reason: HOSPADM

## 2025-05-27 RX ORDER — CARBAMAZEPINE 200 MG/1
400 TABLET ORAL ONCE
Status: COMPLETED | OUTPATIENT
Start: 2025-05-27 | End: 2025-05-27

## 2025-05-27 RX ORDER — CARBAMAZEPINE 200 MG/1
400 TABLET ORAL
Status: DISCONTINUED | OUTPATIENT
Start: 2025-05-28 | End: 2025-05-28 | Stop reason: HOSPADM

## 2025-05-27 RX ADMIN — Medication 10 MG: at 22:37

## 2025-05-27 RX ADMIN — LORAZEPAM 0.5 MG: 0.5 TABLET ORAL at 20:37

## 2025-05-27 RX ADMIN — PRAZOSIN HYDROCHLORIDE 2 MG: 2 CAPSULE ORAL at 21:21

## 2025-05-27 RX ADMIN — ROSUVASTATIN 20 MG: 10 TABLET, FILM COATED ORAL at 21:22

## 2025-05-27 RX ADMIN — QUETIAPINE FUMARATE 150 MG: 100 TABLET ORAL at 21:22

## 2025-05-27 RX ADMIN — BUDESONIDE AND FORMOTEROL FUMARATE DIHYDRATE 2 PUFF: 160; 4.5 AEROSOL RESPIRATORY (INHALATION) at 20:37

## 2025-05-27 RX ADMIN — BRIVARACETAM 150 MG: 50 TABLET, FILM COATED ORAL at 21:17

## 2025-05-27 RX ADMIN — LORAZEPAM 0.5 MG: 2 INJECTION INTRAMUSCULAR; INTRAVENOUS at 14:57

## 2025-05-27 RX ADMIN — CARBAMAZEPINE 200 MG: 200 TABLET ORAL at 21:24

## 2025-05-27 RX ADMIN — CARBAMAZEPINE 400 MG: 200 TABLET ORAL at 16:20

## 2025-05-27 RX ADMIN — PREGABALIN 25 MG: 25 CAPSULE ORAL at 21:20

## 2025-05-27 RX ADMIN — ACETAMINOPHEN 975 MG: 325 TABLET ORAL at 20:36

## 2025-05-27 RX ADMIN — WARFARIN SODIUM 7.5 MG: 5 TABLET ORAL at 21:20

## 2025-05-27 ASSESSMENT — COLUMBIA-SUICIDE SEVERITY RATING SCALE - C-SSRS
6. HAVE YOU EVER DONE ANYTHING, STARTED TO DO ANYTHING, OR PREPARED TO DO ANYTHING TO END YOUR LIFE?: YES
1. IN THE PAST MONTH, HAVE YOU WISHED YOU WERE DEAD OR WISHED YOU COULD GO TO SLEEP AND NOT WAKE UP?: YES
4. HAVE YOU HAD THESE THOUGHTS AND HAD SOME INTENTION OF ACTING ON THEM?: NO
3. HAVE YOU BEEN THINKING ABOUT HOW YOU MIGHT KILL YOURSELF?: NO
2. HAVE YOU ACTUALLY HAD ANY THOUGHTS OF KILLING YOURSELF IN THE PAST MONTH?: YES

## 2025-05-27 ASSESSMENT — ACTIVITIES OF DAILY LIVING (ADL)
ADLS_ACUITY_SCORE: 41
ADLS_ACUITY_SCORE: 49
ADLS_ACUITY_SCORE: 41

## 2025-05-27 NOTE — ED PROVIDER NOTES
EMERGENCY DEPARTMENT ENCOUNTER      NAME: Valentina Olmedo  AGE: 65 year old female  YOB: 1960  MRN: [id number]  EVALUATION DATE & TIME: 2025  2:13 PM    PCP: Jose Maria Morin    ED PROVIDER: Isma Dodson D.O.      Chief Complaint   Patient presents with    Depression       FINAL IMPRESSION:  1. Suicidal thoughts    2. Anxiety    3. Depression, unspecified depression type        ED COURSE & MEDICAL DECISION MAKIN:16 PM I met with the patient to gather history and to perform my initial exam. I discussed the plan for care while in the Emergency Department.  5:13 PM I spoke with DEC.          Pertinent Labs & Imaging studies reviewed. (See chart for details)  65 year old female presents to the Emergency Department for evaluation of anxiety and vague suicidal thoughts.  Patient is medically cleared for behavioral health treatment and management.  She is quite anxious and I did give her some Ativan here, due to the fact the patient has an allergy listed to Vistaril.  This did improve her some, but she is still quite anxious, and worried that if she goes home she may attempt to hurt herself.  I had her evaluated by DEC, and at this time we are unable to get the patient to give a reliable safety plan, and therefore will require admission for inpatient behavioral health management.  She is otherwise stable here without acute other abnormality.  Patient is awaiting inpatient behavioral health placement at this time.    Medical Decision Making  Care impacted by Mental Health  Admit.    MIPS (CTPE, Dental pain, Smith, Sinusitis, Asthma/COPD, Head Trauma): Not Applicable    SEPSIS: None         At the conclusion of the encounter I discussed the results of all of the tests and the disposition. The questions were answered. The patient or family acknowledged understanding and was agreeable with the care plan.        HPI    Patient information was obtained from: Patient.    Use of : N/A         Valentina Olmedo is a 65 year old female who presents via private car with  for evaluation of depression.    Patient was sent from urgent care for evaluation of ongoing depression.  Patient states she has had an increase in her depression and anxiety over the past few days.  She does not want to hurt herself, but is afraid.  She is tearful during our conversation.  She endorses mild chest pain, but believes it could be due to rapid breathing and crying.  She endorses nausea and dry heaves, but denies diarrhea.  The nausea and has been present for over a week.  She denies recent fevers.  Patient is on multiple medications for anxiety and depression.  She denies any recent tobacco or alcohol use.     Per Chart Review:   5/22/2025 Patient was seen at [facility] for evaluation of major depressive disorder and hospital follow-up.  Emergency department visits in the past 4 to 6 weeks.  Has passive suicidal ideation.  Client was suggested apart after recent hospital admission.  Patient has been having ongoing symptoms of shortness of breath.  She has been taking her Symbicort daily.  Provider increased Symbicort to twice daily.  Considered use of benzodiazepine, however patient is unable to state whether current medications are helping her symptoms for mental health and shortness of breath.  Provider believes once shortness of breath is under control patient's anxiety and acute mental health symptoms will improve.       PAST MEDICAL HISTORY:  Past Medical History:   Diagnosis Date    Adrenal mass 10/12/2015    Chen Null MD  They were incidentally discovered on the CAT scan of the abdomen from December 2011 which was done for evaluation of kidney stones. F/up CT of the abdomen done on 6/26/12 showed a stable 5 mm nodular opacity in the right lower lung lobe, adjacent to the diagram. Bilateral adrenal masses average density measured less than 0 Hounsfield units, consistent with benign  etio    Age-related cataract of both eyes, unspecified age-related cataract type 03/27/2024    Anxiety     Anxiety     Arthritis     Borderline personality disorder (H)     Cancer (H)     COPD (chronic obstructive pulmonary disease) (H)     Depression     Depressive disorder     History of COVID-19 4/19/2021    Hyperlipidemia     Hypertension     Hypertension     Hyponatremia     Hypothyroidism     Malignant neoplasm of thyroid gland (H)     Chen Null MD  She had R hemithyroidectomy for a right neck tumor in 1994. According to the patient, the tumor was benign. She is not sure whether or not the tumor belonged to the thyroid gland. The surgery was done here at the [facility]. In January 2011, she was diagnosed with kidney stones. She was found to have an elevated calcium level of 11.7, with a PTH level of 368. Stone an    Primary hyperparathyroidism           PTSD (post-traumatic stress disorder)     Pulmonary embolism with infarction (H) 01/12/2021    Recurrent otitis media     Seizure disorder (H)     Sleep apnea     cpap at [facility]    Stroke (H)     Tobacco abuse 9/29/2015    Vertigo        PAST SURGICAL HISTORY:  Past Surgical History:   Procedure Laterality Date    ABDOMEN SURGERY      ADRENALECTOMY Left     BRAIN SURGERY      COLONOSCOPY N/A 5/7/2024    Procedure: WITH COLONOSCOPY with polypectomies;  Surgeon: Walt Lira MD;  Location: [facility] OR    CRANIOTOMY FOR TEMPORAL LOBECTOMY Right     x3 for seizure    DILATION AND CURETTAGE      ESOPHAGOSCOPY, GASTROSCOPY, DUODENOSCOPY (EGD), COMBINED N/A 5/7/2024    Procedure: and biopsies;  Surgeon: Walt Lira MD;  Location: [facility] OR    ESOPHAGOSCOPY, GASTROSCOPY, DUODENOSCOPY (EGD), DILATATION, COMBINED N/A 5/7/2024    Procedure: ESOPHAGOGASTRODUODENOSCOPY with esophageal dilation;  Surgeon: Walt Lira MD;  Location: [facility] OR    HEAD & NECK SURGERY      HYSTERECTOMY, PAP NO LONGER INDICATED N/A     LITHOTRIPSY       PARATHYROID GLAND SURGERY      resection of adenoma    REFRACTIVE SURGERY Bilateral     RELEASE CARPAL TUNNEL Bilateral     TONSILLECTOMY & ADENOIDECTOMY      TOTAL THYROIDECTOMY      TOTAL VAGINAL HYSTERECTOMY      39 years old. Benign         CURRENT MEDICATIONS:    Current Facility-Administered Medications   Medication Dose Route Frequency Provider Last Rate Last Admin    albuterol (PROVENTIL HFA/VENTOLIN HFA) inhaler  2 puff Inhalation Q4H PRN Isma Dodson DO        [START ON 5/28/2025] ARIPiprazole (ABILIFY) tablet 15 mg  15 mg Oral QAM Isma Dodson DO        Brivaracetam (BRIVIACT) tablet 150 mg  150 mg Oral BID Isma Dodson DO        budesonide-formoterol (SYMBICORT/BREYNA) 160-4.5 MCG/ACT inhaler 2 puff  2 puff Inhalation BID Isma Dodson DO        carBAMazepine (TEGretol) tablet 200 mg  200 mg Oral BID Isma Dodson DO        [START ON 5/28/2025] carBAMazepine (TEGretol) tablet 400 mg  400 mg Oral Daily with lunch Isma Dodson DO        denosumab (PROLIA) injection 60 mg  60 mg Subcutaneous Q6 Months    60 mg at 11/14/24 1329    [START ON 5/28/2025] DULoxetine (CYMBALTA) DR capsule 120 mg  120 mg Oral Daily Isma Dodson DO        [START ON 5/28/2025] levothyroxine (SYNTHROID/LEVOTHROID) tablet 300 mcg  300 mcg Oral Daily Isma Dodson DO        LORazepam (ATIVAN) tablet 0.5 mg  0.5 mg Oral Q6H PRN Isma Dodson DO        [START ON 5/28/2025] pantoprazole (PROTONIX) EC tablet 40 mg  40 mg Oral QAM AC Isma Dodson DO        prazosin (MINIPRESS) capsule 2 mg  2 mg Oral At Bedtime Isma Dodson DO        pregabalin (LYRICA) capsule 25 mg  25 mg Oral BID Isma Dodson DO        QUEtiapine Fumarate TABS 1 tablet  1 tablet Oral At Bedtime Isma Dodson DO        rosuvastatin (CRESTOR) tablet 20 mg  20 mg Oral Daily Isma Dodson DO        warfarin ANTICOAGULANT (COUMADIN) tablet 7.5 mg  7.5 mg Oral Q24H  Isma Dodson, DO         Current Outpatient Medications   Medication Sig Dispense Refill    acetaminophen (TYLENOL) 500 MG tablet Take 1-2 tablets (500-1,000 mg) by mouth every 8 hours as needed for mild pain. 120 tablet 1    albuterol (PROAIR HFA) 108 (90 Base) MCG/ACT inhaler Inhale 2 puffs into the lungs every 4 hours as needed for shortness of breath or wheezing. 18 g 5    albuterol (PROVENTIL) (2.5 MG/3ML) 0.083% neb solution Take 1 vial (2.5 mg) by nebulization every 6 hours as needed for shortness of breath, wheezing or cough. 90 mL 0    ARIPiprazole (ABILIFY) 15 MG tablet Take 1 tablet (15 mg) by mouth every morning. 30 tablet 1    Brivaracetam (BRIVIACT) 100 MG tablet Take 150 mg by mouth 2 times daily       budesonide-formoterol (SYMBICORT/BREYNA) 160-4.5 MCG/ACT inhaler Inhale 2 puffs into the lungs 2 times daily. 10.2 g 2    carBAMazepine (TEGRETOL) 200 MG tablet 200mg in the AM 400mg at lunch and 200mg at HS      cyanocobalamin (VITAMIN B-12) 1000 MCG tablet Take 1 tablet (1,000 mcg) by mouth daily. 30 tablet 1    DULoxetine (CYMBALTA) 60 MG capsule Take 2 capsules (120 mg) by mouth daily. 180 capsule 2    EPINEPHrine (ANY BX GENERIC EQUIV) 0.3 MG/0.3ML injection 2-pack Inject 0.3 mLs (0.3 mg) into the muscle as needed for anaphylaxis 2 each 1    esomeprazole (NEXIUM) 40 MG DR capsule Take 1 capsule (40 mg) by mouth every morning (before breakfast). Take 30-60 minutes before eating. 30 capsule 11    fluticasone (FLONASE) 50 MCG/ACT nasal spray INSTILL 1 SPRAY IN IN EACH NOSTRIL TWICE A DAY 16 g 2    GEMTESA 75 MG TABS tablet Take 75 mg by mouth daily.      levothyroxine (SYNTHROID/LEVOTHROID) 300 MCG tablet TAKE 1 TABLET BY MOUTH DAILY 28 tablet 12    LORazepam (ATIVAN) 0.5 MG tablet Take 1 tablet (0.5 mg) by mouth every 6 hours as needed for anxiety. 15 tablet 0    melatonin 3 MG tablet Take 1 tablet (3 mg) by mouth nightly as needed for sleep. 90 tablet 4    ondansetron (ZOFRAN ODT) 4 MG ODT  tab Take 1 tablet (4 mg) by mouth every 8 hours as needed for nausea. 30 tablet 1    prazosin (MINIPRESS) 2 MG capsule Take 1 capsule (2 mg) by mouth at bedtime. 90 capsule 2    predniSONE (DELTASONE) 10 MG tablet Take 4 tablets (40 mg) by mouth daily for 3 days, THEN 3 tablets (30 mg) daily for 3 days, THEN 2 tablets (20 mg) daily for 3 days, THEN 1 tablet (10 mg) daily for 3 days, THEN 0.5 tablets (5 mg) daily for 4 days. 32 tablet 0    pregabalin (LYRICA) 25 MG capsule TAKE 1 CAPSULE (25 MG) BY MOUTH 2 TIMES DAILY 56 capsule 5    QUEtiapine Fumarate 150 MG TABS TAKE 1 TABLET BY MOUTH EVERY NIGHT AT BEDTIME 30 tablet 2    rosuvastatin (CRESTOR) 20 MG tablet TAKE 1 TABLET BY MOUTH DAILY 90 tablet 1    SENNA-TIME 8.6 MG tablet TAKE 2 TABLETS (17.2MG) BY MOUTH TWICE A  tablet 11    SUMAtriptan (IMITREX) 50 MG tablet Take 1 tablet (50 mg) by mouth at onset of headache for migraine May repeat in 2 hours. Max 4 tablets/24 hours. 12 tablet 3    vitamin D3 (CHOLECALCIFEROL) 50 mcg (2000 units) tablet Take 2 tablets (100 mcg) by mouth daily. 180 tablet 3    warfarin ANTICOAGULANT (COUMADIN) 5 MG tablet Take 5 mg by mouth every Thursday; take 7.5 mg by mouth once daily on all other days.      denosumab (PROLIA) 60 MG/ML SOSY injection Inject 60 mg Subcutaneous once Per pt report           ALLERGIES:  Allergies   Allergen Reactions    Aspirin Shortness Of Breath    Bees Anaphylaxis    Lamotrigine Dizziness and Unknown    Latex Itching    Phenobarbital      aggression    Vistaril [Hydroxyzine] Other (See Comments)     Sluggish, unable to wake up    Gabapentin Rash       FAMILY HISTORY:  Family History   Problem Relation Age of Onset    Chronic Obstructive Pulmonary Disease Mother     Other - See Comments Father         estranged    Skin Cancer Father     Lung Cancer Maternal Grandfather 76    Other - See Comments Brother         Missing since 1992    Alcoholism Brother     Diabetes Sister     Depression Sister      Alcoholism Sister     No Known Problems Sister         Half sister       SOCIAL HISTORY:  Social History     Socioeconomic History    Marital status:    Tobacco Use    Smoking status: Former     Types: Cigarettes     Passive exposure: Never    Smokeless tobacco: Never    Tobacco comments:     Quit about 5 days ago per pt   Vaping Use    Vaping status: Never Used   Substance and Sexual Activity    Alcohol use: No     Comment: None.    Drug use: No    Sexual activity: Not Currently     Partners: Male     Social Drivers of Health     Financial Resource Strain: High Risk (1/3/2024)    Financial Resource Strain     Within the past 12 months, have you or your family members you live with been unable to get utilities (heat, electricity) when it was really needed?: Yes   Food Insecurity: Patient Declined (5/13/2025)    Received from [employer]    Hunger Vital Sign     Worried About Running Out of Food in the Last Year: Patient declined     Ran Out of Food in the Last Year: Patient declined   Transportation Needs: Patient Declined (5/13/2025)    Received from [employer]    PRAPARE - Transportation     Lack of Transportation (Medical): Patient declined     Lack of Transportation (Non-Medical): Patient declined   Interpersonal Safety: Patient Declined (5/19/2025)    Received from [employer]    Humiliation, Afraid, Rape, and Kick questionnaire     Fear of Current or Ex-Partner: Patient declined     Emotionally Abused: Patient declined     Physically Abused: Patient declined     Sexually Abused: Patient declined   Housing Stability: Patient Declined (5/13/2025)    Received from [employer]    Housing Stability Vital Sign     Unable to Pay for Housing in the Last Year: Patient declined     Homeless in the Last Year: Patient declined       VITALS:  Patient Vitals for the past 24 hrs:   BP Temp Temp src Pulse Resp SpO2 Height Weight   05/27/25 1547 134/68 -- -- -- -- -- -- --   05/27/25 1545 134/68 -- -- 68 --  "96 % -- --   05/27/25 1407 (!) 157/88 98.2  F (36.8  C) Temporal 91 18 97 % 1.676 m (5' 6\") (!) 150.6 kg (332 lb)       PHYSICAL EXAM    VITAL SIGNS: /68   Pulse 68   Temp 98.2  F (36.8  C) (Temporal)   Resp 18   Ht 1.676 m (5' 6\")   Wt (!) 150.6 kg (332 lb)   LMP  (LMP Unknown)   SpO2 96%   BMI 53.59 kg/m      General Appearance: Anxious appearing, tearful, well-nourished, no acute distress   Head:  Normocephalic, without obvious abnormality, atraumatic  Eyes:  PERRL, conjunctiva/corneas clear, EOM's intact,  ENT:  Lips, mucosa, and tongue normal, membranes are moist without pallor  Neck:  Normal ROM, symmetrical, trachea midline    Cardio:  Regular rate and rhythm, no murmur, rub or gallop, 2+ pulses symmetric in all extremities  Pulm:  Clear to auscultation bilaterally, respirations unlabored,  Abdomen:  Soft, non-tender, no rebound or guarding.  Musculoskeletal: Full ROM, no edema, no cyanosis, good ROM of major joints  Integument:  Warm, Dry, No erythema, No rash.    Neurologic:  Alert & oriented.  No focal deficits appreciated.    Psychiatric: Anxious, tearful, avoiding eye contact    LABS  Results for orders placed or performed during the hospital encounter of 05/27/25 (from the past 24 hours)   CBC with platelets + differential    Narrative    The following orders were created for panel order CBC with platelets + differential.  Procedure                               Abnormality         Status                     ---------                               -----------         ------                     CBC with platelets and ...[[id number]]  Abnormal            Final result                 Please view results for these tests on the individual orders.   Basic metabolic panel   Result Value Ref Range    Sodium 140 135 - 145 mmol/L    Potassium 3.7 3.4 - 5.3 mmol/L    Chloride 103 98 - 107 mmol/L    Carbon Dioxide (CO2) 24 22 - 29 mmol/L    Anion Gap 13 7 - 15 mmol/L    Urea Nitrogen 8.8 8.0 - 23.0 " mg/dL    Creatinine 0.73 0.51 - 0.95 mg/dL    GFR Estimate >90 >60 mL/min/1.73m2    Calcium 9.3 8.8 - 10.4 mg/dL    Glucose 116 (H) 70 - 99 mg/dL   Troponin T, High Sensitivity   Result Value Ref Range    Troponin T, High Sensitivity 11 <=14 ng/L   CBC with platelets and differential   Result Value Ref Range    WBC Count 5.9 4.0 - 11.0 10e3/uL    RBC Count 5.34 (H) 3.80 - 5.20 10e6/uL    Hemoglobin 14.5 11.7 - 15.7 g/dL    Hematocrit 44.1 35.0 - 47.0 %    MCV 83 78 - 100 fL    MCH 27.2 26.5 - 33.0 pg    MCHC 32.9 31.5 - 36.5 g/dL    RDW 13.5 10.0 - 15.0 %    Platelet Count 237 150 - 450 10e3/uL    % Neutrophils 65 %    % Lymphocytes 27 %    % Monocytes 7 %    % Eosinophils 1 %    % Basophils 1 %    % Immature Granulocytes 0 %    NRBCs per 100 WBC 0 <1 /100    Absolute Neutrophils 3.9 1.6 - 8.3 10e3/uL    Absolute Lymphocytes 1.6 0.8 - 5.3 10e3/uL    Absolute Monocytes 0.4 0.0 - 1.3 10e3/uL    Absolute Eosinophils 0.0 0.0 - 0.7 10e3/uL    Absolute Basophils 0.0 0.0 - 0.2 10e3/uL    Absolute Immature Granulocytes 0.0 <=0.4 10e3/uL    Absolute NRBCs 0.0 10e3/uL   XR Chest Port 1 View    Narrative    EXAM: XR CHEST PORT 1 VIEW  LOCATION: [facility]  DATE: 5/27/2025    INDICATION: Chest pain.  COMPARISON: Chest radiograph 5/19/2025.      Impression    IMPRESSION:     Lungs are clear. No focal airspace opacities, pleural effusions, or pneumothorax. Normal pulmonary vascularity.    Stable, nonenlarged cardiac silhouette.   Troponin T, High Sensitivity   Result Value Ref Range    Troponin T, High Sensitivity 10 <=14 ng/L     *Note: Due to a large number of results and/or encounters for the requested time period, some results have not been displayed. A complete set of results can be found in Results Review.         RADIOLOGY  XR Chest Port 1 View   Final Result   IMPRESSION:       Lungs are clear. No focal airspace opacities, pleural effusions, or pneumothorax. Normal pulmonary vascularity.       Stable, nonenlarged cardiac silhouette.          MEDICATIONS GIVEN IN THE EMERGENCY:  Medications   albuterol (PROVENTIL HFA/VENTOLIN HFA) inhaler (has no administration in time range)   budesonide-formoterol (SYMBICORT/BREYNA) 160-4.5 MCG/ACT inhaler 2 puff (has no administration in time range)   prazosin (MINIPRESS) capsule 2 mg (has no administration in time range)   rosuvastatin (CRESTOR) tablet 20 mg (has no administration in time range)   ARIPiprazole (ABILIFY) tablet 15 mg (has no administration in time range)   DULoxetine (CYMBALTA) DR capsule 120 mg (has no administration in time range)   LORazepam (ATIVAN) tablet 0.5 mg (has no administration in time range)   QUEtiapine Fumarate TABS 1 tablet (has no administration in time range)   levothyroxine (SYNTHROID/LEVOTHROID) tablet 300 mcg (has no administration in time range)   pantoprazole (PROTONIX) EC tablet 40 mg (has no administration in time range)   warfarin ANTICOAGULANT (COUMADIN) tablet 7.5 mg (has no administration in time range)   Brivaracetam (BRIVIACT) tablet 150 mg (has no administration in time range)   pregabalin (LYRICA) capsule 25 mg (has no administration in time range)   carBAMazepine (TEGretol) tablet 200 mg (has no administration in time range)   carBAMazepine (TEGretol) tablet 400 mg (has no administration in time range)   LORazepam (ATIVAN) injection 0.5 mg (0.5 mg Intravenous $Given 5/27/25 7917)   carBAMazepine (TEGretol) tablet 400 mg (400 mg Oral $Given 5/27/25 1620)       NEW PRESCRIPTIONS STARTED AT TODAY'S ER VISIT  New Prescriptions    No medications on file        I, Terese Mendoza, am serving as a scribe to document services personally performed by Isma Dodson D.O., based on my observations and the provider's statements to me.  I, Isma Dodson D.O., attest that Terese Mendoza is acting in a scribe capacity, has observed my performance of the services and has documented them in accordance with my direction.     Isma Dodson  FELIPE  Emergency Medicine  [facility] EMERGENCY DEPARTMENT  [address] [place] [id number]  [phone]  Dept: [phone]       Isma Dodson DO  05/27/25 1740

## 2025-05-27 NOTE — PLAN OF CARE
Valentina Olmedo  May 27, 2025  Plan of Care Hand-off Note     Patient Recommended Care Path: inpatient mental health    Clinical Substantiation:  It is the recommendation of this clinician that pt admit to IP  for safety and stabilization. Despite having extensive outpatient services, and identified family/friend support network, pt was brought to ED from her appointment with her PCP for endorsing SI, depressive symptoms, and emotional lability. Although pt currently denies active SI while in ED, pt endorses the following risk factors that make inpatient appropriate level of care: hx of SI attempts, has emotional lability that may contribute to impulsivity, has worsening financial and health concerns, and has very limited insight into her mental health symptoms. Upon  attempting to engage pt in safety planning, pt suddenly and loudly starting sobbing and yelling  I can t . Pt and collateral were informed of recommendation and are in agreement with disposition. Pt has been placed on IP  waitlist, and psychiatry consult will be placed to begin active treatment while pt boards in ED.    Goals for crisis stabilization:  symptom stablization, med management    Next steps for Care Team:  IP psych consult has been placed    Treatment Objectives Addressed:  rapport building, orienting the patient to therapy, assessing safety, exploring obstacles to safety in the community    Therapeutic Interventions:  Engaged in guided discovery, explored patient's perspectives and helped expand them through socratic dialogue., Explored strategies for self-soothing.    Has a specific means been identified for suicidal.homicide actions: No  If yes, describe:    Explain action steps toward mitigation:    Document completion of mitigation action:    The follow up action still needed prior to discharge:      Patient coping skills attempted to reduce the crisis:  help seeking       Imminent risk of harm: Suicidal Behavior  Severe  psychiatric, behavioral or other comorbid conditions are appropriate for management at inpatient mental health as indicated by at least one of the following: Psychiatric Symptoms  Severe dysfunction in daily living is present as indicated by at least one of the following: Complete inability to maintain any appropriate aspect of personal responsibility in any adult roles  Situation and expectations are appropriate for inpatient care: Voluntary treatment at lower level of care is not feasible  Inpatient mental health services are necessary to meet patient needs and at least one of the following: Specific condition related to admission diagnosis is present and judged likely to further improve at proposed level of care, Specific condition related to admission diagnosis is present and judged likely to deteriorate in absence of treatment at proposed level of care      Collateral contact information:  Jass Olmedo (spouse)    Legal Status: Voluntary/Patient has signed consent for treatment                                                                                                                                 Reviewed court records: yes     Psychiatry Consult: Patient has Psychiatry Consult Order    BELKIS Bowling LICSW on 5/27/2025 at 5:57 PM

## 2025-05-27 NOTE — PHARMACY-ADMISSION MEDICATION HISTORY
Pharmacy Intern Admission Medication History    Admission medication history is complete. The information provided in this note is only as accurate as the sources available at the time of the update.    Information Source(s): Patient, Family member, Prescription bottles, and CareEverywhere/SureScripts via in-person    Pertinent Information: Patient emphasized they'd like their carbamazepine 400 mg afternoon dose. Confirmed warfarin regimen per patient and last INR visit. Patient is not taking Wegovy. Based on fill date and number of pills left of prednisone, 3 tablets are due today and tomorrow's dose will be 2 tablets. Patient's new blister packs had Abilify 15 mg, but current pack removed abilify and has 10 mg Abilify bottle; patient is indicated for 15 mg, but may be taking 10 mg recently. Fill Hx for vibegron (Gemtesa), but not brought here or recognized by patient.     Changes made to PTA medication list:  Added: None  Deleted: diazepam, Lasix, multivitamin, nystatin, Miralax, Polytrim, prednisolone (opth), Chantix (all per patient).   Changed: Warfarin, carbamazepine     Allergies reviewed with patient and updates made in EHR: yes    Medication History Completed By: ALVAREZ DIAZ 5/27/2025 3:18 PM    Current Facility-Administered Medications for the 5/27/25 encounter (Hospital Encounter)   Medication    denosumab (PROLIA) injection 60 mg     PTA Med List   Medication Sig Last Dose/Taking    acetaminophen (TYLENOL) 500 MG tablet Take 1-2 tablets (500-1,000 mg) by mouth every 8 hours as needed for mild pain. Taking As Needed    albuterol (PROAIR HFA) 108 (90 Base) MCG/ACT inhaler Inhale 2 puffs into the lungs every 4 hours as needed for shortness of breath or wheezing. Taking As Needed    albuterol (PROVENTIL) (2.5 MG/3ML) 0.083% neb solution Take 1 vial (2.5 mg) by nebulization every 6 hours as needed for shortness of breath, wheezing or cough. Taking As Needed    ARIPiprazole (ABILIFY) 15 MG tablet Take 1  tablet (15 mg) by mouth every morning. Taking    Brivaracetam (BRIVIACT) 100 MG tablet Take 150 mg by mouth 2 times daily  5/27/2025 Morning    budesonide-formoterol (SYMBICORT/BREYNA) 160-4.5 MCG/ACT inhaler Inhale 2 puffs into the lungs 2 times daily. 5/27/2025 Morning    carBAMazepine (TEGRETOL) 200 MG tablet 200mg in the AM 400mg at lunch and 200mg at HS 5/27/2025 Morning    cyanocobalamin (VITAMIN B-12) 1000 MCG tablet Take 1 tablet (1,000 mcg) by mouth daily. 5/27/2025 Morning    DULoxetine (CYMBALTA) 60 MG capsule Take 2 capsules (120 mg) by mouth daily. 5/27/2025 Morning    EPINEPHrine (ANY BX GENERIC EQUIV) 0.3 MG/0.3ML injection 2-pack Inject 0.3 mLs (0.3 mg) into the muscle as needed for anaphylaxis Taking As Needed    esomeprazole (NEXIUM) 40 MG DR capsule Take 1 capsule (40 mg) by mouth every morning (before breakfast). Take 30-60 minutes before eating. 5/27/2025 Morning    fluticasone (FLONASE) 50 MCG/ACT nasal spray INSTILL 1 SPRAY IN IN EACH NOSTRIL TWICE A DAY Unknown    GEMTESA 75 MG TABS tablet Take 75 mg by mouth daily. Unknown    levothyroxine (SYNTHROID/LEVOTHROID) 300 MCG tablet TAKE 1 TABLET BY MOUTH DAILY 5/27/2025 Morning    LORazepam (ATIVAN) 0.5 MG tablet Take 1 tablet (0.5 mg) by mouth every 6 hours as needed for anxiety. Taking As Needed    melatonin 3 MG tablet Take 1 tablet (3 mg) by mouth nightly as needed for sleep. Taking As Needed    ondansetron (ZOFRAN ODT) 4 MG ODT tab Take 1 tablet (4 mg) by mouth every 8 hours as needed for nausea. Taking As Needed    prazosin (MINIPRESS) 2 MG capsule Take 1 capsule (2 mg) by mouth at bedtime. 5/26/2025 Bedtime    predniSONE (DELTASONE) 10 MG tablet Take 4 tablets (40 mg) by mouth daily for 3 days, THEN 3 tablets (30 mg) daily for 3 days, THEN 2 tablets (20 mg) daily for 3 days, THEN 1 tablet (10 mg) daily for 3 days, THEN 0.5 tablets (5 mg) daily for 4 days. 5/26/2025    pregabalin (LYRICA) 25 MG capsule TAKE 1 CAPSULE (25 MG) BY MOUTH 2  TIMES DAILY 5/27/2025 Morning    QUEtiapine Fumarate 150 MG TABS TAKE 1 TABLET BY MOUTH EVERY NIGHT AT BEDTIME 5/26/2025 Bedtime    rosuvastatin (CRESTOR) 20 MG tablet TAKE 1 TABLET BY MOUTH DAILY 5/26/2025 Bedtime    SENNA-TIME 8.6 MG tablet TAKE 2 TABLETS (17.2MG) BY MOUTH TWICE A DAY 5/27/2025 Morning    SUMAtriptan (IMITREX) 50 MG tablet Take 1 tablet (50 mg) by mouth at onset of headache for migraine May repeat in 2 hours. Max 4 tablets/24 hours. Taking As Needed    vitamin D3 (CHOLECALCIFEROL) 50 mcg (2000 units) tablet Take 2 tablets (100 mcg) by mouth daily. 5/27/2025 Morning    warfarin ANTICOAGULANT (COUMADIN) 5 MG tablet Take 5 mg by mouth every Thursday; take 7.5 mg by mouth once daily on all other days. 5/26/2025 Evening

## 2025-05-27 NOTE — PROGRESS NOTES
Assessment & Plan   Assessment & Plan  Schizoaffective disorder, depressive type (H)  This patient presents describing 4 to 5 days of nonstop crying.  She appears agitated in the time is short of breath despite having very normal vital signs.  She was hospitalized 10 below for suicidal ideation and acute depression.  Doses of medicines were adjusted.  If anything, she is actually worsened.  Her symptoms correspond to titration of Abilify.  We considered whether or not she is experiencing restlessness and akathisia from her medication.  However she has been on Abilify without significant side effects throughout 2024.  She endorses passive suicidal ideation.  Given her poor functional status going into this current depressive episode, I am concerned that she would not be safe to return home and that she would not be able to adequately take care of herself.  For this reason, she was referred to the emergency department.  Nursing staff are my clinic signed out to emergency department personnel.  I spoke with the patient's psychiatrist and we briefly entertained the possibility of an alternative second-generation antipsychotic such as Latuda.  She has been on Risperdal in the past.  Given the acuity of her symptoms however we abandoned outpatient management in favor of referral and additional evaluation.   -She is on a prednisone burst with lengthy taper as there previously was some evidence that this had helped with breathing.  I think it is unlikely that there is a physical cause of her symptoms of shortness of breath.  Mental health crisis would be compatible with the workup that is been done at various emergency department visits over the past month.         The longitudinal plan of care for the diagnosis(es)/condition(s) as documented were addressed during this visit. Due to the added complexity in care, I will continue to support Valentina in the subsequent management and with ongoing continuity of care.    54 minutes  "spent by me on the date of the encounter doing chart review, history and exam, documentation and further activities per the note       Subjective   Valentina is a 65 year old, presenting for the following health issues:  RECHECK        5/27/2025    12:23 PM   Additional Questions   Roomed by xl     Patient expresses frustration and concern that she cannot stop crying.  Her left arm is shaking and she states that her right arm is starting to be affected.  Her breathing has not improved at all.              Objective    BP (!) 153/106 (BP Location: Right arm, Patient Position: Sitting, Cuff Size: Adult Large)   Pulse 89   Temp 98.1  F (36.7  C) (Oral)   Resp 20   Ht 1.676 m (5' 6\")   Wt (!) 151.3 kg (333 lb 9.6 oz)   LMP  (LMP Unknown)   SpO2 97%   BMI 53.84 kg/m    Body mass index is 53.84 kg/m .  Physical Exam   General: Patient cries throughout the encounter.  She puts her head down in exasperation.  She is agitated.  Cardiac: Regular rate and rhythm, normal S1/S2, no murmurs rubs or gallops  Respiratory: Clear to auscultation bilaterally  Psych: Anxious, tearful, concrete.            Signed Electronically by: Jose Maria Morin MD    "
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
## 2025-05-27 NOTE — ED NOTES
Expected Patient Referral to ED  1:24 PM    Referring Clinic/Provider:  [facility]    Reason for referral/Clinical facts:  Depression, passive SI    Recommendations provided:  Send to ED for further evaluation    Caller was informed that this institution does possess the capabilities and/or resources to provide for patient and should be transferred to our facility.    Discussed that if direct admit is sought and any hurdles are encountered, this ED would be happy to see the patient and evaluate.    Informed caller that recommendations provided are recommendations based only on the facts provided and that they responsible to accept or reject the advice, or to seek a formal in person consultation as needed and that this ED will see/treat patient should they arrive.      Jose Fields MD  [facility] EMERGENCY DEPARTMENT  [address] [place] [id number] [phone]     Jose Fields MD  05/27/25 [id number]

## 2025-05-27 NOTE — ED NOTES
IP MH Referral Acuity Rating Score (RARS)    LMHP complete at referral to IP MH, with DEC; and, daily while awaiting IP MH placement. Call score to PPS.  CRITERIA SCORING   New 73 HH and Involuntary for IP MH (not adolescent) 0/3   Boarding over 24 hours 0/1   Vulnerable adult at least 55+ with multiple co morbidities; or, Patient age 11 or under 1/1   Suicide ideation without relief of precipitating factors 1/1   Current plan for suicide 0/1   Current plan for homicide 0/1   Imminent risk or actual attempt to seriously harm another without relief of factors precipitating the attempt 0/1   Severe dysfunction in daily living (ex: complete neglect for self care, extreme disruption in vegetative function, extreme deterioration in social interactions) 1/1   Recent (last 2 weeks) or current physical aggression in the ED 0/1   Restraints or seclusion episode in ED 0/1   Verbal aggression, agitation, yelling, etc., while in the ED 0/1   Active psychosis with psychomotor agitation or catatonia 0/1   Need for constant or near constant redirection (from leaving, from others, etc).  0/1   Intrusive or disruptive behaviors 0/1   TOTAL 3    BELIKS Bowling on 5/27/2025 at 5:18 PM

## 2025-05-27 NOTE — ED TRIAGE NOTES
"Pt presents with depression.  Pt sent from .  Pt denies a plan.  Pt sad and tearful in triage stating, \"I need help\".  Pt takes anti-depressant.     Triage Assessment (Adult)       Row Name 05/27/25 1409          Triage Assessment    Airway WDL WDL        Respiratory WDL    Respiratory WDL WDL                     "

## 2025-05-27 NOTE — ED PROVIDER NOTES
[facility] EMERGENCY DEPARTMENT   ED Mental Health Observation - Initiation Note    Valentina Olmedo was placed into observation at 5:26 PM on 5/27/2025 for {Reason for Obs:624881}.   Patient is expected to be under observation status for a minimum of eight hours.    Isma Dodson D.O. ***

## 2025-05-27 NOTE — CONSULTS
"Diagnostic Evaluation Consultation  Crisis Assessment    Patient Name: Valentina Olmedo  Age:  65 year old  Legal Sex: female  Gender Identity: female  Pronouns:      Race: White  Ethnicity: Not  or   Language: English      Patient was assessed: Virtual: [employer]   Crisis Assessment Start Date: 05/27/25  Crisis Assessment Start Time: 1651  Crisis Assessment Stop Time: 1710  Patient location: [facility] Emergency Department                             [id number]    Referral Data and Chief Complaint  Valentina Olmedo presents to the ED via EMS. Patient is presenting to the ED for the following concerns: Suicidal ideation, Depression, Health stressors. Factors that make the mental health crisis life threatening or complex are: Pt sent to ED from  PCP after endorsing SI. Pt denies a plan but is sad and tearful in triage stating, \"I need help\". Upon DEC assessment, pt alert and oriented but is highly emotionally labile and is a poor history/has limited insight. Pt initially had anxious affect, and reports since being discharged from [facility] earlier this month she has had  crying spells .  When asked what she believes is causing this spells, pt yelled  I don t know  then began to sob. She then stopped after about a minute and appeared anxious again. This repeated several times during the 20 minute assessment. Pt reports that when she was discharged recently, she was not stabilized and they let her go too soon. She reports she had asked to discharge and said she felt ready, because she felt the nurses were rude to her. Pt endorses decrease in appetite, wanting to sleep more, and hopelessness, in addition to her crying spells. When asked about SI pt denied a plan, and then yelled  I don t want to live but I don t want to die  and then covered her face and began sobbing. Pt endorses anxious symptoms of shortness of breath, chest tightness, and racing thoughts. She denies AH but endorses " occasionally seeing VH in the form of  blobs . Pt denies any SIB,HI, or substance use..      Informed Consent and Assessment Methods  Explained the crisis assessment process, including applicable information disclosures and limits to confidentiality, assessed understanding of the process, and obtained consent to proceed with the assessment.  Assessment methods included conducting a formal interview with patient, review of medical records, collaboration with medical staff, and obtaining relevant collateral information from family and community providers when available.  : done     History of the Crisis   Pt has historical dx of  PTSD, Anxiety, Depression, and Schizoaffective Disorder.This is pt's 8th ED visit in the past 30 days. Valentina has primary care established at [facility] with Jose Maria Morin and psychiatric med management at [facility] with Donna Galan.  Pt also has  through [facility]. Pt has hx of  IP hospitalizations, with most recent being on 5/13 of this month.    Brief Psychosocial History  Family:  , Children no  Support System:    Employment Status:     Source of Income:     Financial Environmental Concerns:     Current Hobbies:     Barriers in Personal Life:       Significant Clinical History  Current Anxiety Symptoms:  racing thoughts, anxious, shortness of breath or racing heart  Current Depression/Trauma:  impaired decision making, irritable, hopelessness, helplessness, sadness, crying or feels like crying, thoughts of death/suicide  Current Somatic Symptoms:  racing thoughts, anxious, shortness of breath or racing heart  Current Psychosis/Thought Disturbance:  anger, forgetful, hyperactive, inattentive, distractability, visual hallucinations  Current Eating Symptoms:  loss of appetite  Chemical Use History:      Past diagnosis:  Anxiety Disorder, Bipolar Disorder, Depression, Suicide attempt(s), PTSD  Family history:  No known  history of mental health or chemical health concerns  Past treatment:  Individual therapy, Inpatient Hospitalization, Case management, Primary Care, Psychiatric Medication Management  Details of most recent treatment:  pt was discharged from [facility] unit ([facility]) on 5/13  Other relevant history:  Pt has several chronic medical conditions she is being treated for. She lives at home with her . Endorses history of childhood trauma. No legal concerns    Have there been any medication changes in the past two weeks:  yes, please comment  pt had several changes while hospitalized earlier this month    Is the patient compliant with medications:  yes        Collateral Information  Is there collateral information: Yes     Collateral information name, relationship, phone number:  Jass    What happened today: He was just told to bring his wife to the ED from her PCP     What is different about patient's functioning: she has uncontrollable crying spells, seems to sleep all the time,and lacks motivation. Spouse endorses they are going through financial stress as well as she is struggling with health conditions     What do you think the patient needs:      Has patient made comments about wanting to kill themselves/others: yes    If d/c is recommended, can they take part in safety/aftercare planning:  yes    Additional collateral information:        Risk Assessment  [place] Suicide Severity Rating Scale Full Clinical Version:  Suicidal Ideation  Q1 Wish to be Dead (Lifetime): Yes  Q2 Non-Specific Active Suicidal Thoughts (Lifetime): Yes  3. Active Suicidal Ideation with any Methods (Not Plan) Without Intent to Act (Lifetime): Yes  5. Active Suicidal Ideation with Specific Plan and Intent (Lifetime): Yes  Q6 Suicide Behavior (Lifetime): yes  Intensity of Ideation (Lifetime)  Most Severe Ideation Rating (Lifetime): 5  Frequency (Lifetime): Once a week  Duration (Lifetime): Less than 1 hour/some of the  time  Controllability (Lifetime): Can control thoughts with some difficulty  Deterrents (Lifetime): Uncertain that deterrents stopped you  Reasons for Ideation (Lifetime): Mostly to end or stop the pain (You couldn't go on living with the pain or how you were feeling)  Suicidal Behavior (Lifetime)  Actual Attempt (Lifetime): Yes  Total Number of Actual Attempts (Lifetime): 1  Actual Attempt Description (Lifetime): 1984: planned to jump off bridge  Has subject engaged in non-suicidal self-injurious behavior? (Lifetime): No  Interrupted Attempts (Lifetime): Yes  Total Number of Interrupted Attempts (Lifetime): 1  Interrupted Attempt Description (Lifetime): pt's freind intervened at the bridge and stopped pt from jumping  Aborted or Self-Interrupted Attempt (Lifetime): No  Preparatory Acts or Behavior (Lifetime): No    [place] Suicide Severity Rating Scale Recent:   Suicidal Ideation (Recent)  Q1 Wished to be Dead (Past Month): yes  Q2 Suicidal Thoughts (Past Month): yes  Q3 Suicidal Thought Method: no  Q4 Suicidal Intent without Specific Plan: no  If yes to Q6, within past 3 months?: no  Level of Risk per Screen: moderate risk  Intensity of Ideation (Recent)  Most Severe Ideation Rating (Past 1 Month): 3  Frequency (Past 1 Month): Once a week  Duration (Past 1 Month): Less than 1 hour/some of the time  Controllability (Past 1 Month): Can control thoughts with some difficulty  Deterrents (Past 1 Month): Uncertain that deterrents stopped you  Reasons for Ideation (Past 1 Month): Does not apply  Suicidal Behavior (Recent)  Actual Attempt (Past 3 Months): No  Has subject engaged in non-suicidal self-injurious behavior? (Past 3 Months): No  Interrupted Attempts (Past 3 Months): No  Aborted or Self-Interrupted Attempt (Past 3 Months): No  Preparatory Acts or Behavior (Past 3 Months): No    Environmental or Psychosocial Events: worsening chronic illness, other (see comment), challenging interpersonal relationships, other  life stressors  Protective Factors: Protective Factors: strong bond to family unit, community support, or employment, intact marriage or domestic partnership, responsibilities and duties to others, including pets and children, lives in a responsibly safe and stable environment, help seeking, supportive ongoing medical and mental health care relationships, able to access care without barriers, cultural, spiritual , or Hoahaoism beliefs associated with meaning and value in life    Does the patient have thoughts of harming others? Feels Like Hurting Others: no  Previous Attempt to Hurt Others: no  Current presentation: Irritable  Is the patient engaging in sexually inappropriate behavior?: no  Duty to warn initiated: no  Does Patient have a known history of aggressive behavior: No    Is the patient engaging in sexually inappropriate behavior?  no        Mental Status Exam   Affect:    Appearance:    Attention Span/Concentration:    Eye Contact:      Fund of Knowledge:     Language /Speech Content:    Language /Speech Volume:    Language /Speech Rate/Productions:    Recent Memory:    Remote Memory:    Mood:    Orientation to Person:     Orientation to Place:    Orientation to Time of Day:    Orientation to Date:       Situation (Do they understand why they are here?):    Psychomotor Behavior:    Thought Content:    Thought Form:       Mini-Cog Assessment  Number of Words Recalled:    Clock-Drawing Test:     Three Item Recall:    Mini-Cog Total Score:       Medication  Psychotropic medications:   Medication Orders - Psychiatric (From admission, onward)      Start     Dose/Rate Route Frequency Ordered Stop    05/28/25 0800  ARIPiprazole (ABILIFY) tablet 15 mg         15 mg Oral EVERY MORNING 05/27/25 1733      05/28/25 0800  DULoxetine (CYMBALTA) DR capsule 120 mg         120 mg Oral DAILY 05/27/25 1733      05/27/25 2200  QUEtiapine Fumarate TABS 1 tablet         1 tablet Oral AT BEDTIME 05/27/25 1733      05/27/25 1730   LORazepam (ATIVAN) tablet 0.5 mg         0.5 mg Oral EVERY 6 HOURS PRN 05/27/25 7468               Current Care Team  Patient Care Team:  Jose Maria Morin MD as PCP - General (Family Medicine)  Jose Maria Morin MD as Assigned PCP  Paula Renae MD as MD (Otolaryngology)  Zhanna Renee, PT as Physical Therapist (Physical Therapist - Orthopedic)  Lou Swanson PA-C as Assigned Musculoskeletal Provider  Amy Bhatt MBBS as MD (Rheumatology)  Sen Braxton MD as Assigned Pulmonology Provider  Amy Bhatt MBBS as Assigned Rheumatology Provider  Corinne Lebron PA-C as Physician Assistant (Family Medicine)  Noman Yo DO as Assigned Neuroscience Provider  Breana Puente MD as Assigned Behavioral Health Provider    Diagnosis  Patient Active Problem List   Diagnosis Code    Essential hypertension I10    CHRIS (obstructive sleep apnea) G47.33    Seasonal and perennial allergic rhinoconjunctivitis of right eye J30.2, H10.11, J30.89    Closed head injury S09.90XA    History of thyroid cancer Z85.850    Adrenal mass, left E27.8    Class 3 severe obesity due to excess calories with serious comorbidity and body mass index (BMI) of 50.0 to 59.9 in adult (H) E66.813, Z68.43    Nonintractable epilepsy without status epilepticus (H) G40.909    Esophageal reflux K21.9    Mixed hyperlipidemia E78.2    HLD (hyperlipidemia) E78.5    Hypothyroidism E03.9    Chronic obstructive pulmonary disease (H) J44.9    Schizoaffective disorder, depressive type (H) F25.1    DNR (do not resuscitate) Z66    Migraine headache G43.909    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    Fracture of vertebra due to osteoporosis with routine healing, subsequent encounter M80.08XD    Major depressive disorder, recurrent episode, moderate (H) F33.1    Venous stasis I87.8    Peripheral polyneuropathy G62.9    Pulmonary embolism with infarction (H) I26.99    Idiopathic osteoporosis M81.8    Physical deconditioning R53.81    PTSD  (post-traumatic stress disorder) F43.10    Long term (current) use of anticoagulants Z79.01    History of melanoma Z85.820    Seasonal affective disorder F33.8    Vision changes H53.9    Bilateral hand pain M79.641, M79.642    Problem related to housing and economic circumstances Z59.9    History of pulmonary embolism Z86.711    Bilateral sensorineural hearing loss H90.3    Intertrigo L30.4    Dysfunction of both eustachian tubes H69.93    Chronic intractable pain G89.29    Strain of lumbar paraspinal muscle, initial encounter S39.012A    Cervical stenosis of spinal canal M48.02    Cervical radiculopathy M54.12    History of colonic polyps Z86.0100    Hematoma of right lower leg S80.11XA    Nicotine use disorder F17.200    Right knee injury, sequela S89.91XS    Dysuria R30.0    History of stroke Z86.73    Panic attack F41.0       Primary Problem This Admission  Active Hospital Problems    Schizoaffective disorder, depressive type (H)      *Major depressive disorder, recurrent episode, moderate (H)        Clinical Summary and Substantiation of Recommendations   Clinical Substantiation:  It is the recommendation of this clinician that pt admit to [facility] for safety and stabilization. Despite having extensive outpatient services, and identified family/friend support network, pt was brought to ED from her appointment with her PCP for endorsing SI, depressive symptoms, and emotional lability. Although pt currently denies active SI while in ED, pt endorses the following risk factors that make inpatient appropriate level of care: hx of SI attempts, has emotional lability that may contribute to impulsivity, has worsening financial and health concerns, and has very limited insight into her mental health symptoms. Upon  attempting to engage pt in safety planning, pt suddenly and loudly starting sobbing and yelling  I can t . Pt and collateral were informed of recommendation and are in agreement with disposition. Pt has  been placed on IP MH waitlist, and psychiatry consult will be placed to begin active treatment while pt boards in ED.    Goals for crisis stabilization:  symptom stablization, med management    Next steps for Care Team:  IP psych consult has been placed    Treatment Objectives Addressed:  rapport building, orienting the patient to therapy, assessing safety, exploring obstacles to safety in the community    Therapeutic Interventions:  Engaged in guided discovery, explored patient's perspectives and helped expand them through socratic dialogue., Explored strategies for self-soothing.    Has a specific means been identified for suicidal/homicide actions: No    If yes, describe:       Explain action steps toward mitigation:       Document completion of mitigation actions:       The follow up action still needed prior to discharge:       Patient coping skills attempted to reduce the crisis:  help seeking    Disposition  Recommended referrals: Medication Management, Individual Therapy, Crisis Services        Reviewed case and recommendations with attending provider. Attending Name: Dr. Dodson       Attending concurs with disposition: yes       Patient and/or validated legal guardian concurs with disposition:   yes       Final disposition:  inpatient mental health         Imminent risk of harm: Suicidal Behavior  Severe psychiatric, behavioral or other comorbid conditions are appropriate for management at inpatient mental health as indicated by at least one of the following: Psychiatric Symptoms  Severe dysfunction in daily living is present as indicated by at least one of the following: Complete inability to maintain any appropriate aspect of personal responsibility in any adult roles  Situation and expectations are appropriate for inpatient care: Voluntary treatment at lower level of care is not feasible  Inpatient mental health services are necessary to meet patient needs and at least one of the following: Specific  condition related to admission diagnosis is present and judged likely to further improve at proposed level of care, Specific condition related to admission diagnosis is present and judged likely to deteriorate in absence of treatment at proposed level of care      Legal status: Voluntary/Patient has signed consent for treatment                                                                                                                                 Reviewed court records: yes       Assessment Details   Total duration spent with the patient: 18 min     CPT code(s) utilized: 68458 - Psychotherapy (with patient) - 30 (16-37*) min    BELKIS Bowling, Psychotherapist  [facility] - Triage & Transition Services  Callback: [phone]    BELKIS Bowling on 5/27/2025 at 5:56 PM

## 2025-05-28 ENCOUNTER — TELEPHONE (OUTPATIENT)
Dept: BEHAVIORAL HEALTH | Facility: CLINIC | Age: 65
End: 2025-05-28

## 2025-05-28 ENCOUNTER — DOCUMENTATION ONLY (OUTPATIENT)
Dept: ANTICOAGULATION | Facility: CLINIC | Age: 65
End: 2025-05-28

## 2025-05-28 ENCOUNTER — TELEPHONE (OUTPATIENT)
Dept: BEHAVIORAL HEALTH | Facility: CLINIC | Age: 65
End: 2025-05-28
Payer: COMMERCIAL

## 2025-05-28 ENCOUNTER — HOSPITAL ENCOUNTER (INPATIENT)
Facility: CLINIC | Age: 65
End: 2025-05-28
Attending: PSYCHIATRY & NEUROLOGY | Admitting: PSYCHIATRY & NEUROLOGY
Payer: COMMERCIAL

## 2025-05-28 VITALS
OXYGEN SATURATION: 97 % | SYSTOLIC BLOOD PRESSURE: 108 MMHG | WEIGHT: 293 LBS | TEMPERATURE: 98.2 F | BODY MASS INDEX: 47.09 KG/M2 | RESPIRATION RATE: 20 BRPM | DIASTOLIC BLOOD PRESSURE: 61 MMHG | HEART RATE: 63 BPM | HEIGHT: 66 IN

## 2025-05-28 DIAGNOSIS — Z79.01 LONG TERM (CURRENT) USE OF ANTICOAGULANTS: ICD-10-CM

## 2025-05-28 DIAGNOSIS — Z86.711 HISTORY OF PULMONARY EMBOLISM: ICD-10-CM

## 2025-05-28 DIAGNOSIS — I26.99 PULMONARY EMBOLISM WITH INFARCTION (H): Primary | ICD-10-CM

## 2025-05-28 DIAGNOSIS — M81.0 AGE-RELATED OSTEOPOROSIS WITHOUT CURRENT PATHOLOGICAL FRACTURE: ICD-10-CM

## 2025-05-28 DIAGNOSIS — N18.32 STAGE 3B CHRONIC KIDNEY DISEASE (H): ICD-10-CM

## 2025-05-28 PROBLEM — F32.A DEPRESSION: Status: ACTIVE | Noted: 2025-05-28

## 2025-05-28 LAB
ALBUMIN UR-MCNC: NEGATIVE MG/DL
AMPHETAMINES UR QL SCN: ABNORMAL
APPEARANCE UR: ABNORMAL
ATRIAL RATE - MUSE: 81 BPM
BACTERIA #/AREA URNS HPF: ABNORMAL /HPF
BARBITURATES UR QL SCN: ABNORMAL
BENZODIAZ UR QL SCN: ABNORMAL
BILIRUB UR QL STRIP: NEGATIVE
BZE UR QL SCN: ABNORMAL
CANNABINOIDS UR QL SCN: ABNORMAL
CAOX CRY #/AREA URNS HPF: ABNORMAL /HPF
COLOR UR AUTO: ABNORMAL
DIASTOLIC BLOOD PRESSURE - MUSE: NORMAL MMHG
FENTANYL UR QL: ABNORMAL
FLUAV RNA SPEC QL NAA+PROBE: NEGATIVE
FLUBV RNA RESP QL NAA+PROBE: NEGATIVE
GLUCOSE UR STRIP-MCNC: NEGATIVE MG/DL
HGB UR QL STRIP: NEGATIVE
HOLD SPECIMEN: NORMAL
HOLD SPECIMEN: NORMAL
INR PPP: 2.81 (ref 0.85–1.15)
INTERPRETATION ECG - MUSE: NORMAL
KETONES UR STRIP-MCNC: NEGATIVE MG/DL
LEUKOCYTE ESTERASE UR QL STRIP: ABNORMAL
MUCOUS THREADS #/AREA URNS LPF: PRESENT /LPF
NITRATE UR QL: POSITIVE
OPIATES UR QL SCN: ABNORMAL
P AXIS - MUSE: 60 DEGREES
PCP QUAL URINE (ROCHE): ABNORMAL
PH UR STRIP: 5.5 [PH] (ref 5–7)
PR INTERVAL - MUSE: 224 MS
PROTHROMBIN TIME: 29.5 SECONDS (ref 11.8–14.8)
QRS DURATION - MUSE: 90 MS
QT - MUSE: 398 MS
QTC - MUSE: 462 MS
R AXIS - MUSE: 0 DEGREES
RBC URINE: 6 /HPF
RSV RNA SPEC NAA+PROBE: NEGATIVE
SARS-COV-2 RNA RESP QL NAA+PROBE: NEGATIVE
SP GR UR STRIP: 1.03 (ref 1–1.03)
SQUAMOUS EPITHELIAL: 4 /HPF
SYSTOLIC BLOOD PRESSURE - MUSE: NORMAL MMHG
T AXIS - MUSE: 47 DEGREES
T4 FREE SERPL-MCNC: 1.61 NG/DL (ref 0.9–1.7)
TSH SERPL DL<=0.005 MIU/L-ACNC: 0.01 UIU/ML (ref 0.3–4.2)
UROBILINOGEN UR STRIP-MCNC: NORMAL MG/DL
VENTRICULAR RATE- MUSE: 81 BPM
WBC URINE: 67 /HPF

## 2025-05-28 PROCEDURE — 250N000013 HC RX MED GY IP 250 OP 250 PS 637: Performed by: EMERGENCY MEDICINE

## 2025-05-28 PROCEDURE — 250N000013 HC RX MED GY IP 250 OP 250 PS 637: Performed by: PSYCHIATRY & NEUROLOGY

## 2025-05-28 PROCEDURE — 84439 ASSAY OF FREE THYROXINE: CPT | Performed by: EMERGENCY MEDICINE

## 2025-05-28 PROCEDURE — 124N000002 HC R&B MH UMMC

## 2025-05-28 PROCEDURE — 80307 DRUG TEST PRSMV CHEM ANLYZR: CPT | Performed by: EMERGENCY MEDICINE

## 2025-05-28 PROCEDURE — 250N000012 HC RX MED GY IP 250 OP 636 PS 637: Performed by: PSYCHIATRY & NEUROLOGY

## 2025-05-28 PROCEDURE — 85610 PROTHROMBIN TIME: CPT | Performed by: EMERGENCY MEDICINE

## 2025-05-28 PROCEDURE — 250N000009 HC RX 250: Performed by: STUDENT IN AN ORGANIZED HEALTH CARE EDUCATION/TRAINING PROGRAM

## 2025-05-28 PROCEDURE — 84443 ASSAY THYROID STIM HORMONE: CPT | Performed by: EMERGENCY MEDICINE

## 2025-05-28 PROCEDURE — 87637 SARSCOV2&INF A&B&RSV AMP PRB: CPT | Performed by: EMERGENCY MEDICINE

## 2025-05-28 PROCEDURE — 87186 SC STD MICRODIL/AGAR DIL: CPT | Performed by: EMERGENCY MEDICINE

## 2025-05-28 PROCEDURE — 36415 COLL VENOUS BLD VENIPUNCTURE: CPT | Performed by: EMERGENCY MEDICINE

## 2025-05-28 PROCEDURE — 81003 URINALYSIS AUTO W/O SCOPE: CPT | Performed by: EMERGENCY MEDICINE

## 2025-05-28 RX ORDER — LEVOTHYROXINE SODIUM 150 UG/1
300 TABLET ORAL
Status: DISPENSED | OUTPATIENT
Start: 2025-05-29

## 2025-05-28 RX ORDER — WARFARIN SODIUM 7.5 MG/1
7.5 TABLET ORAL ONCE
Status: COMPLETED | OUTPATIENT
Start: 2025-05-28 | End: 2025-05-28

## 2025-05-28 RX ORDER — PREDNISONE 5 MG/1
5 TABLET ORAL DAILY
Status: DISPENSED | OUTPATIENT
Start: 2025-06-03 | End: 2025-06-07

## 2025-05-28 RX ORDER — SUMATRIPTAN 50 MG/1
50 TABLET, FILM COATED ORAL
Status: ACTIVE | OUTPATIENT
Start: 2025-05-28

## 2025-05-28 RX ORDER — PREDNISONE 5 MG/1
10 TABLET ORAL DAILY
Status: COMPLETED | OUTPATIENT
Start: 2025-05-31 | End: 2025-06-02

## 2025-05-28 RX ORDER — ROSUVASTATIN CALCIUM 20 MG/1
20 TABLET, COATED ORAL DAILY
Status: DISPENSED | OUTPATIENT
Start: 2025-05-28

## 2025-05-28 RX ORDER — MAGNESIUM HYDROXIDE/ALUMINUM HYDROXICE/SIMETHICONE 120; 1200; 1200 MG/30ML; MG/30ML; MG/30ML
30 SUSPENSION ORAL EVERY 4 HOURS PRN
Status: ACTIVE | OUTPATIENT
Start: 2025-05-28

## 2025-05-28 RX ORDER — PRAZOSIN HYDROCHLORIDE 2 MG/1
2 CAPSULE ORAL AT BEDTIME
Status: DISPENSED | OUTPATIENT
Start: 2025-05-28

## 2025-05-28 RX ORDER — WARFARIN SODIUM 5 MG/1
5 TABLET ORAL SEE ADMIN INSTRUCTIONS
Status: DISCONTINUED | OUTPATIENT
Start: 2025-05-28 | End: 2025-05-28

## 2025-05-28 RX ORDER — ACETAMINOPHEN 500 MG
500-1000 TABLET ORAL EVERY 8 HOURS PRN
Status: DISCONTINUED | OUTPATIENT
Start: 2025-05-28 | End: 2025-05-28

## 2025-05-28 RX ORDER — CARBAMAZEPINE 200 MG/1
200 TABLET ORAL 2 TIMES DAILY
Status: DISPENSED | OUTPATIENT
Start: 2025-05-28

## 2025-05-28 RX ORDER — ACETAMINOPHEN 325 MG/1
650 TABLET ORAL EVERY 6 HOURS PRN
Status: DISCONTINUED | OUTPATIENT
Start: 2025-05-28 | End: 2025-05-28 | Stop reason: HOSPADM

## 2025-05-28 RX ORDER — OLANZAPINE 10 MG/1
10 TABLET, FILM COATED ORAL 3 TIMES DAILY PRN
Status: DISPENSED | OUTPATIENT
Start: 2025-05-28

## 2025-05-28 RX ORDER — DULOXETIN HYDROCHLORIDE 30 MG/1
120 CAPSULE, DELAYED RELEASE ORAL DAILY
Status: DISPENSED | OUTPATIENT
Start: 2025-05-29

## 2025-05-28 RX ORDER — PREGABALIN 25 MG/1
25 CAPSULE ORAL 2 TIMES DAILY
Status: DISPENSED | OUTPATIENT
Start: 2025-05-28

## 2025-05-28 RX ORDER — PREDNISONE 5 MG/1
20 TABLET ORAL DAILY
Status: COMPLETED | OUTPATIENT
Start: 2025-05-28 | End: 2025-05-30

## 2025-05-28 RX ORDER — CEPHALEXIN 500 MG/1
500 CAPSULE ORAL ONCE
Status: COMPLETED | OUTPATIENT
Start: 2025-05-28 | End: 2025-05-28

## 2025-05-28 RX ORDER — ACETAMINOPHEN 325 MG/1
650 TABLET ORAL EVERY 4 HOURS PRN
Status: DISPENSED | OUTPATIENT
Start: 2025-05-28

## 2025-05-28 RX ORDER — ALBUTEROL SULFATE 0.83 MG/ML
2.5 SOLUTION RESPIRATORY (INHALATION) EVERY 6 HOURS PRN
Status: ACTIVE | OUTPATIENT
Start: 2025-05-28

## 2025-05-28 RX ORDER — LORAZEPAM 0.5 MG/1
0.5 TABLET ORAL EVERY 6 HOURS PRN
Status: DISPENSED | OUTPATIENT
Start: 2025-05-28

## 2025-05-28 RX ORDER — OLANZAPINE 10 MG/2ML
10 INJECTION, POWDER, FOR SOLUTION INTRAMUSCULAR 3 TIMES DAILY PRN
Status: ACTIVE | OUTPATIENT
Start: 2025-05-28

## 2025-05-28 RX ORDER — ACETAMINOPHEN 325 MG/1
650 TABLET ORAL ONCE
Status: DISCONTINUED | OUTPATIENT
Start: 2025-05-28 | End: 2025-05-28

## 2025-05-28 RX ORDER — OXYMETAZOLINE HYDROCHLORIDE 0.05 G/100ML
2 SPRAY NASAL ONCE
Status: COMPLETED | OUTPATIENT
Start: 2025-05-28 | End: 2025-05-28

## 2025-05-28 RX ORDER — TRAZODONE HYDROCHLORIDE 50 MG/1
50 TABLET ORAL
Status: DISPENSED | OUTPATIENT
Start: 2025-05-28

## 2025-05-28 RX ORDER — EPINEPHRINE 0.3 MG/.3ML
0.3 INJECTION SUBCUTANEOUS
Status: ACTIVE | OUTPATIENT
Start: 2025-05-28

## 2025-05-28 RX ORDER — OLANZAPINE 10 MG/2ML
5 INJECTION, POWDER, FOR SOLUTION INTRAMUSCULAR DAILY PRN
Status: DISCONTINUED | OUTPATIENT
Start: 2025-05-28 | End: 2025-05-28 | Stop reason: HOSPADM

## 2025-05-28 RX ORDER — PANTOPRAZOLE SODIUM 40 MG/1
40 TABLET, DELAYED RELEASE ORAL
Status: DISPENSED | OUTPATIENT
Start: 2025-05-29

## 2025-05-28 RX ORDER — SENNOSIDES 8.6 MG
2 TABLET ORAL 2 TIMES DAILY
Status: DISPENSED | OUTPATIENT
Start: 2025-05-28

## 2025-05-28 RX ORDER — CARBAMAZEPINE 400 MG/1
400 TABLET, EXTENDED RELEASE ORAL
Status: DISCONTINUED | OUTPATIENT
Start: 2025-05-29 | End: 2025-05-29

## 2025-05-28 RX ORDER — NITROFURANTOIN 25; 75 MG/1; MG/1
100 CAPSULE ORAL EVERY 12 HOURS SCHEDULED
Status: DISCONTINUED | OUTPATIENT
Start: 2025-05-28 | End: 2025-05-28 | Stop reason: HOSPADM

## 2025-05-28 RX ORDER — ONDANSETRON 4 MG/1
4 TABLET, ORALLY DISINTEGRATING ORAL EVERY 8 HOURS PRN
Status: DISPENSED | OUTPATIENT
Start: 2025-05-28

## 2025-05-28 RX ORDER — VITAMIN B COMPLEX
100 TABLET ORAL DAILY
Status: DISPENSED | OUTPATIENT
Start: 2025-05-28

## 2025-05-28 RX ORDER — FLUTICASONE PROPIONATE 50 MCG
1 SPRAY, SUSPENSION (ML) NASAL 2 TIMES DAILY
Status: DISPENSED | OUTPATIENT
Start: 2025-05-28

## 2025-05-28 RX ORDER — ALBUTEROL SULFATE 90 UG/1
2 INHALANT RESPIRATORY (INHALATION) EVERY 4 HOURS PRN
Status: DISPENSED | OUTPATIENT
Start: 2025-05-28

## 2025-05-28 RX ORDER — LANOLIN ALCOHOL/MO/W.PET/CERES
1000 CREAM (GRAM) TOPICAL DAILY
Status: DISPENSED | OUTPATIENT
Start: 2025-05-28

## 2025-05-28 RX ORDER — OLANZAPINE 5 MG/1
5 TABLET, FILM COATED ORAL 2 TIMES DAILY PRN
Status: DISCONTINUED | OUTPATIENT
Start: 2025-05-28 | End: 2025-05-28 | Stop reason: HOSPADM

## 2025-05-28 RX ORDER — BUDESONIDE AND FORMOTEROL FUMARATE DIHYDRATE 160; 4.5 UG/1; UG/1
2 AEROSOL RESPIRATORY (INHALATION) 2 TIMES DAILY
Status: DISPENSED | OUTPATIENT
Start: 2025-05-28

## 2025-05-28 RX ADMIN — PREGABALIN 25 MG: 25 CAPSULE ORAL at 21:03

## 2025-05-28 RX ADMIN — PREDNISONE 20 MG: 5 TABLET ORAL at 21:03

## 2025-05-28 RX ADMIN — LEVOTHYROXINE SODIUM 300 MCG: 100 TABLET ORAL at 07:37

## 2025-05-28 RX ADMIN — CARBAMAZEPINE 200 MG: 200 TABLET ORAL at 20:59

## 2025-05-28 RX ADMIN — PANTOPRAZOLE SODIUM 40 MG: 40 TABLET, DELAYED RELEASE ORAL at 07:35

## 2025-05-28 RX ADMIN — BRIVARACETAM 150 MG: 50 TABLET, FILM COATED ORAL at 08:09

## 2025-05-28 RX ADMIN — SENNOSIDES 2 TABLET: 8.6 TABLET ORAL at 20:59

## 2025-05-28 RX ADMIN — LORAZEPAM 0.5 MG: 0.5 TABLET ORAL at 12:02

## 2025-05-28 RX ADMIN — Medication 100 MCG: at 19:44

## 2025-05-28 RX ADMIN — PREGABALIN 25 MG: 25 CAPSULE ORAL at 07:35

## 2025-05-28 RX ADMIN — FLUTICASONE PROPIONATE 1 SPRAY: 50 SPRAY, METERED NASAL at 21:00

## 2025-05-28 RX ADMIN — ALBUTEROL SULFATE 2 PUFF: 90 AEROSOL, METERED RESPIRATORY (INHALATION) at 21:01

## 2025-05-28 RX ADMIN — Medication 150 MG: at 21:00

## 2025-05-28 RX ADMIN — LORAZEPAM 0.5 MG: 0.5 TABLET ORAL at 19:54

## 2025-05-28 RX ADMIN — DULOXETINE HYDROCHLORIDE 120 MG: 60 CAPSULE, DELAYED RELEASE PELLETS ORAL at 07:37

## 2025-05-28 RX ADMIN — BUDESONIDE AND FORMOTEROL FUMARATE DIHYDRATE 2 PUFF: 160; 4.5 AEROSOL RESPIRATORY (INHALATION) at 07:37

## 2025-05-28 RX ADMIN — WARFARIN SODIUM 7.5 MG: 5 TABLET ORAL at 19:40

## 2025-05-28 RX ADMIN — PRAZOSIN HYDROCHLORIDE 2 MG: 2 CAPSULE ORAL at 21:03

## 2025-05-28 RX ADMIN — BRIVARACETAM 150 MG: 50 TABLET, FILM COATED ORAL at 23:55

## 2025-05-28 RX ADMIN — CARBAMAZEPINE 400 MG: 200 TABLET ORAL at 12:00

## 2025-05-28 RX ADMIN — ROSUVASTATIN 20 MG: 20 TABLET, FILM COATED ORAL at 19:52

## 2025-05-28 RX ADMIN — BUDESONIDE AND FORMOTEROL FUMARATE DIHYDRATE 2 PUFF: 160; 4.5 AEROSOL RESPIRATORY (INHALATION) at 20:59

## 2025-05-28 RX ADMIN — OXYMETAZOLINE HYDROCHLORIDE 2 SPRAY: 0.5 SPRAY NASAL at 02:16

## 2025-05-28 RX ADMIN — ACETAMINOPHEN 650 MG: 325 TABLET ORAL at 12:08

## 2025-05-28 RX ADMIN — ARIPIPRAZOLE 15 MG: 15 TABLET ORAL at 07:36

## 2025-05-28 RX ADMIN — CARBAMAZEPINE 200 MG: 200 TABLET ORAL at 07:36

## 2025-05-28 RX ADMIN — CYANOCOBALAMIN TAB 1000 MCG 1000 MCG: 1000 TAB at 19:42

## 2025-05-28 RX ADMIN — CEPHALEXIN 500 MG: 500 CAPSULE ORAL at 16:28

## 2025-05-28 RX ADMIN — ALBUTEROL SULFATE 2 PUFF: 90 AEROSOL, METERED RESPIRATORY (INHALATION) at 15:56

## 2025-05-28 ASSESSMENT — ACTIVITIES OF DAILY LIVING (ADL)
ADLS_ACUITY_SCORE: 49
ADLS_ACUITY_SCORE: 49
ADLS_ACUITY_SCORE: 64
ADLS_ACUITY_SCORE: 49
ADLS_ACUITY_SCORE: 64
ADLS_ACUITY_SCORE: 49
ADLS_ACUITY_SCORE: 64
ADLS_ACUITY_SCORE: 49
ADLS_ACUITY_SCORE: 64
ADLS_ACUITY_SCORE: 49
ADLS_ACUITY_SCORE: 49
ADLS_ACUITY_SCORE: 64

## 2025-05-28 NOTE — TELEPHONE ENCOUNTER
9:35 AM Per call to [facility] currently pending discharge and could review for potential admission. Will need UDS, COVID,TSH, and UA.    9:45 AM Labs requested from [facility] ED RN Raine.    10:12 AM Fax sent.    12:43 PM Per follow up call to [facility] currently full and no longer expecting discharge for review.    1:20 PM Paged Dr. Patterson.    2:38  Writer received message from another  Coordinator Dr. Patterson accepts Pt to 30/Leonardo for self.    2:44 30 CRN unavailable Intake informed unit of pt in queue.     2:48 PM Indicia complete. Pt added to admit board.    2:50 PM [facility] ED notified.

## 2025-05-28 NOTE — ED PROVIDER NOTES
"[facility] EMERGENCY DEPARTMENT   ED Mental Health Observation - Daily Note for 5/28/2025    Valentina Olmedo is a 65 year old female currently boarding in the ED while awaiting placement for Mental health crisis.  Please see the initial H&P for this patient's presentation, workup, and disposition plan.     Hold Status:  Patient is Voluntary, but holdable    Plan:  In brief, the patient's presentation is notable for depression, anxiety, labile mood, and suicidal ideation.  Patient is awaiting Mental health placement    Interim History:  Patient reevaluated by extended care.  Continuing with plan for admission.    Urine analysis was ordered, not by me, I am not sure what prompted this order.  I did result this shift which looks somewhat suspicious for infection.  Culture is in process.  On review of systems with the patient she does report occasionally having some dysuria and wanted to be treated for UTI.  Macrobid twice daily x 5 days ordered.      Physical Exam:  /61 (BP Location: Right arm, Patient Position: Sitting, Cuff Size: Adult Large)   Pulse 63   Temp 98.2  F (36.8  C) (Axillary)   Resp 20   Ht 1.676 m (5' 6\")   Wt (!) 150.6 kg (332 lb)   LMP  (LMP Unknown)   SpO2 97%   BMI 53.59 kg/m    No respiratory distress, on room air   Well perfused  Behavior appropriate    Medications provided prior to my care:  Medications   albuterol (PROVENTIL HFA/VENTOLIN HFA) inhaler (has no administration in time range)   budesonide-formoterol (SYMBICORT/BREYNA) 160-4.5 MCG/ACT inhaler 2 puff (2 puffs Inhalation $Given 5/28/25 0737)   prazosin (MINIPRESS) capsule 2 mg (2 mg Oral $Given 5/27/25 2121)   rosuvastatin (CRESTOR) tablet 20 mg (20 mg Oral $Given 5/27/25 2122)   ARIPiprazole (ABILIFY) tablet 15 mg (15 mg Oral $Given 5/28/25 0736)   DULoxetine (CYMBALTA) DR capsule 120 mg (120 mg Oral $Given 5/28/25 0737)   LORazepam (ATIVAN) tablet 0.5 mg (0.5 mg Oral $Given 5/28/25 1202) "   QUEtiapine (SEROquel) 150 mg (0 mg Oral Stopped 5/27/25 2125)   levothyroxine (SYNTHROID/LEVOTHROID) tablet 300 mcg (300 mcg Oral $Given 5/28/25 0737)   pantoprazole (PROTONIX) EC tablet 40 mg (40 mg Oral $Given 5/28/25 0735)   warfarin ANTICOAGULANT (COUMADIN/JANTOVEN) tablet 7.5 mg (7.5 mg Oral $Given 5/27/25 2120)   Brivaracetam (BRIVIACT) tablet 150 mg (150 mg Oral $Given 5/28/25 0809)   pregabalin (LYRICA) capsule 25 mg (25 mg Oral $Given 5/28/25 0735)   carBAMazepine (TEGretol) tablet 200 mg (200 mg Oral $Given 5/28/25 0736)   carBAMazepine (TEGretol) tablet 400 mg (400 mg Oral $Given 5/28/25 1200)   warfarin ANTICOAGULANT (COUMADIN/JANTOVEN) tablet 5 mg (has no administration in time range)   melatonin tablet 10 mg (has no administration in time range)   OLANZapine (zyPREXA) tablet 5 mg (has no administration in time range)     Or   OLANZapine (zyPREXA) injection 5 mg (has no administration in time range)   acetaminophen (TYLENOL) tablet 650 mg (650 mg Oral $Given 5/28/25 1208)   LORazepam (ATIVAN) injection 0.5 mg (0.5 mg Intravenous $Given 5/27/25 1457)   carBAMazepine (TEGretol) tablet 400 mg (400 mg Oral $Given 5/27/25 1620)   acetaminophen (TYLENOL) tablet 975 mg (975 mg Oral $Given 5/27/25 2036)   oxymetazoline (AFRIN) 0.05 % spray 2 spray (2 sprays Nasal $Given 5/28/25 0216)       Laboratory (reviewed and interpreted):  Labs Ordered and Resulted from Time of ED Arrival to Time of ED Departure   BASIC METABOLIC PANEL - Abnormal       Result Value    Sodium 140      Potassium 3.7      Chloride 103      Carbon Dioxide (CO2) 24      Anion Gap 13      Urea Nitrogen 8.8      Creatinine 0.73      GFR Estimate >90      Calcium 9.3      Glucose 116 (*)    CBC WITH PLATELETS AND DIFFERENTIAL - Abnormal    WBC Count 5.9      RBC Count 5.34 (*)     Hemoglobin 14.5      Hematocrit 44.1      MCV 83      MCH 27.2      MCHC 32.9      RDW 13.5      Platelet Count 237      % Neutrophils 65      % Lymphocytes 27      %  Monocytes 7      % Eosinophils 1      % Basophils 1      % Immature Granulocytes 0      NRBCs per 100 WBC 0      Absolute Neutrophils 3.9      Absolute Lymphocytes 1.6      Absolute Monocytes 0.4      Absolute Eosinophils 0.0      Absolute Basophils 0.0      Absolute Immature Granulocytes 0.0      Absolute NRBCs 0.0     INR - Abnormal    INR 2.96 (*)     PT 30.7 (*)    INR - Abnormal    INR 2.81 (*)     PT 29.5 (*)    TSH WITH FREE T4 REFLEX - Abnormal    TSH 0.01 (*)    URINE DRUG SCREEN PANEL - Abnormal    Amphetamines Urine Screen Negative      Barbituates Urine Screen Negative      Benzodiazepine Urine Screen Positive (*)     Cannabinoids Urine Screen Negative      Cocaine Urine Screen Negative      Fentanyl Qual Urine Screen Negative      Opiates Urine Screen Negative      PCP Urine Screen Negative     TROPONIN T, HIGH SENSITIVITY - Normal    Troponin T, High Sensitivity 11     TROPONIN T, HIGH SENSITIVITY - Normal    Troponin T, High Sensitivity 10     INFLUENZA A/B, RSV AND SARS-COV2 PCR - Normal    Influenza A PCR Negative      Influenza B PCR Negative      RSV PCR Negative      SARS CoV2 PCR Negative     T4 FREE - Normal    Free T4 1.61     ROUTINE UA WITH MICROSCOPIC REFLEX TO CULTURE       ED Course:  12:49 PM  I discussed plan with care team.     Impression/Plan:  1. Suicidal thoughts    2. Anxiety    3. Depression, unspecified depression type        Saad Sterling MD  [facility] EMERGENCY DEPARTMENT  [address] [id number] [phone]  Dept: [phone]         Saad Sterling MD  05/28/25 1242       Saad Sterling MD  05/28/25 [id number]

## 2025-05-28 NOTE — ED NOTES
Patient states she is anxious. Patient is visibly tearful, ativan given. Patient also c/o pain in knee, MD updated, tylenol ordered.

## 2025-05-28 NOTE — ED NOTES
Glasses and shoes remain with pt. No other belongings.  in the room. 1:1 observation in the room.

## 2025-05-28 NOTE — ED NOTES
IP MH Referral Acuity Rating Score (RARS)    LMHP complete at referral to IP MH, with DEC; and, daily while awaiting IP MH placement. Call score to PPS.  CRITERIA SCORING   New 73 HH and Involuntary for IP MH (not adolescent) 0/3   Boarding over 24 hours 1/1   Vulnerable adult at least 55+ with multiple co morbidities; or, Patient age 11 or under 1/1   Suicide ideation without relief of precipitating factors 1/1   Current plan for suicide 0/1   Current plan for homicide 0/1   Imminent risk or actual attempt to seriously harm another without relief of factors precipitating the attempt 0/1   Severe dysfunction in daily living (ex: complete neglect for self care, extreme disruption in vegetative function, extreme deterioration in social interactions) 1/1   Recent (last 2 weeks) or current physical aggression in the ED 0/1   Restraints or seclusion episode in ED 0/1   Verbal aggression, agitation, yelling, etc., while in the ED 0/1   Active psychosis with psychomotor agitation or catatonia 0/1   Need for constant or near constant redirection (from leaving, from others, etc).  0/1   Intrusive or disruptive behaviors 0/1   TOTAL 4

## 2025-05-28 NOTE — PROGRESS NOTES
ANTICOAGULATION  MANAGEMENT: Discharge Review    Valentina Olmedo chart reviewed for anticoagulation continuity of care    Emergency room visit on 5/27-5/28/25 for Suicidal thoughts, anxiety, depression, and acute cystitis without hematuria.    Discharge disposition: Patient admitted to [facility]    Results:    Recent labs: (last 7 days)     05/22/25  0000 05/27/25  1932 05/28/25  0712   INR 3.2* 2.96* 2.81*     Anticoagulation inpatient management:   5/27- 7.5 mg    Anticoagulation discharge instructions:     Warfarin dosing: home regimen continued   Bridging: No   INR goal change: No      Medication changes affecting anticoagulation: No    Additional factors affecting anticoagulation: No     PLAN     No adjustment to anticoagulation plan needed    Recommended follow up is scheduled, patient admitted to [facility] for mental health crisis.    Anticoagulation Calendar updated    Kyleigh Jimenes RN  5/28/2025  Anticoagulation Clinic  [facility] for routing messages: raquel MATHUR  [facility] patient phone line: [phone]
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
## 2025-05-28 NOTE — ASSESSMENT & PLAN NOTE
This patient presents describing 4 to 5 days of nonstop crying.  She appears agitated in the time is short of breath despite having very normal vital signs.  She was hospitalized 10 below for suicidal ideation and acute depression.  Doses of medicines were adjusted.  If anything, she is actually worsened.  Her symptoms correspond to titration of Abilify.  We considered whether or not she is experiencing restlessness and akathisia from her medication.  However she has been on Abilify without significant side effects throughout 2024.  She endorses passive suicidal ideation.  Given her poor functional status going into this current depressive episode, I am concerned that she would not be safe to return home and that she would not be able to adequately take care of herself.  For this reason, she was referred to the emergency department.  Nursing staff are my clinic signed out to emergency department personnel.  I spoke with the patient's psychiatrist and we briefly entertained the possibility of an alternative second-generation antipsychotic such as Latuda.  She has been on Risperdal in the past.  Given the acuity of her symptoms however we abandoned outpatient management in favor of referral and additional evaluation.   -She is on a prednisone burst with lengthy taper as there previously was some evidence that this had helped with breathing.  I think it is unlikely that there is a physical cause of her symptoms of shortness of breath.  Mental health crisis would be compatible with the workup that is been done at various emergency department visits over the past month.

## 2025-05-28 NOTE — ED NOTES
Patient requesting to change rooms with DEC , patient states she is able to keep herself safe in the hospital. Explained to  rooms cannot be switched at this time due to needing 1:1 for SI.

## 2025-05-28 NOTE — PROGRESS NOTES
"Triage & Transition Services, Extended Care     Therapy Progress Note    Patient: Valentina goes by \"Valentina,\" uses she/her pronouns  Date of Service: May 28, 2025  Site of Service: [facility] Emergency Department                             [id number]  Patient was seen yes  Mode of Assessment: Virtual: AmWell    Presentation Summary: Pt presents anxious and labile, tearful and distressed. Pt acknowledges presenting to the ED due to SI, stating \"I don't want to hurt myself, I don't know why they put me in this room like I've done something wrong.\" Writer explained to pt the psych room due to presenting safety concerns. Pt repeated that she is not suicidal, \"I'm in the hospital because I don't want to hurt myself!\" Pt spouse at , appearing supportive. Pt continued to voiced wanting to wait for IP MH admission, advised she is on the waitlist. Unable to engage in safety planning for lower level of care at this time.    Therapeutic Intervention(s) Provided: Engaged in cognitive restructuring/ reframing, looked at common cognitive distortions and challenged negative thoughts., Engaged in guided discovery, explored patient's perspectives and helped expand them through socratic dialogue.    Current Symptoms: excessive worry, obsessions/compulsions, anxious, racing thoughts crying or feels like crying, difficulty concentrating, low self esteem, helplessness, irritable, impaired decision making, sadness, thoughts of death/suicide excessive worry, anxious inattentive, distractability      Mental Status Exam   Affect:    Appearance:    Attention Span/Concentration:    Eye Contact:      Fund of Knowledge:     Language /Speech Content:    Language /Speech Volume:    Language /Speech Rate/Productions:    Recent Memory:    Remote Memory:    Mood:    Orientation to Person:     Orientation to Place:    Orientation to Time of Day:    Orientation to Date:       Situation (Do they understand why they are here?):  " "  Psychomotor Behavior:    Thought Content:    Thought Form:      Treatment Objective(s) Addressed: rapport building, orienting the patient to therapy, assessing safety    Patient Response to Interventions: acceptance expressed, needs reinforcement    Progress Towards Goals: Patient Reports Symptoms Are: ongoing  Patient Progress Toward Goals: is not making progress  Comment: Pt unable to safety plan for lower level of care and denying active SI in hospital setting, asking for a different room.  Next Step to Work Toward Discharge: collaboration with OP team/family/friends, follow up on referrals    Case Management: Case Management Included: collaborating with patient's support system  Details on Collaborating with Patient's Support System: Pt spouse at . Consulted with Ashley England- psych provider for admission criteria. Psych is supporting IP MH admission at this time.    Plan: inpatient mental health  yes provider, psych associate Dr. Sterling; Ashley England  yes    Clinical Substantiation: Pt continues to want IP MH admission, unable to engage in safety planning. Denying active SI, stating \"I don't want to hurt myself, that's why I am here in the hospital!\" Pt very labile and distressed in assessment. Plan to continue to pursue IP MH admission at this time.    Legal Status: Legal Status: Voluntary/Patient has signed consent for treatment    Session Status: Time session started: 1301  Time session ended: 1339  Session Duration (minutes): 6 minutes  Session Number: 1  Anticipated number of sessions or this episode of care: 4    Time Spent: 6 minutes    CPT Code: CPT Codes: Non-Billable    Diagnosis:   Patient Active Problem List   Diagnosis Code    Essential hypertension I10    CHRIS (obstructive sleep apnea) G47.33    Seasonal and perennial allergic rhinoconjunctivitis of right eye J30.2, H10.11, J30.89    Closed head injury S09.90XA    History of thyroid cancer Z85.850    Adrenal mass, left E27.8    " Class 3 severe obesity due to excess calories with serious comorbidity and body mass index (BMI) of 50.0 to 59.9 in adult (H) E66.813, Z68.43    Nonintractable epilepsy without status epilepticus (H) G40.909    Esophageal reflux K21.9    Mixed hyperlipidemia E78.2    HLD (hyperlipidemia) E78.5    Hypothyroidism E03.9    Chronic obstructive pulmonary disease (H) J44.9    Schizoaffective disorder, depressive type (H) F25.1    DNR (do not resuscitate) Z66    Migraine headache G43.909    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    Fracture of vertebra due to osteoporosis with routine healing, subsequent encounter M80.08XD    Major depressive disorder, recurrent episode, moderate (H) F33.1    Venous stasis I87.8    Peripheral polyneuropathy G62.9    Pulmonary embolism with infarction (H) I26.99    Idiopathic osteoporosis M81.8    Physical deconditioning R53.81    PTSD (post-traumatic stress disorder) F43.10    Long term (current) use of anticoagulants Z79.01    History of melanoma Z85.820    Seasonal affective disorder F33.8    Vision changes H53.9    Bilateral hand pain M79.641, M79.642    Problem related to housing and economic circumstances Z59.9    History of pulmonary embolism Z86.711    Bilateral sensorineural hearing loss H90.3    Intertrigo L30.4    Dysfunction of both eustachian tubes H69.93    Chronic intractable pain G89.29    Strain of lumbar paraspinal muscle, initial encounter S39.012A    Cervical stenosis of spinal canal M48.02    Cervical radiculopathy M54.12    History of colonic polyps Z86.0100    Hematoma of right lower leg S80.11XA    Nicotine use disorder F17.200    Right knee injury, sequela S89.91XS    Dysuria R30.0    History of stroke Z86.73    Panic attack F41.0       Primary Problem This Admission: Active Hospital Problems    Schizoaffective disorder, depressive type (H)      *Major depressive disorder, recurrent episode, moderate (H)        BELKIS Kong   Licensed Mental Health Professional  ([facility]), Extended Care  [phone]

## 2025-05-28 NOTE — CONSULTS
"      Initial Psychiatric Consult   Consult date: May 28, 2025         Reason for Consult, requesting source:    ***  Requesting source: Saad Sterling    Labs and imaging reviewed. ***    Total time spent in card review, patient interview and coordination of care; ***         HPI:   ***      Valentina Olmedo presents to the ED via EMS. Patient is presenting to the ED for the following concerns: Suicidal ideation, Depression, Health stressors. Factors that make the mental health crisis life threatening or complex are: Pt sent to ED from  PCP after endorsing SI. Pt denies a plan but is sad and tearful in triage stating, \"I need help\". Upon DEC assessment, pt alert and oriented but is highly emotionally labile and is a poor history/has limited insight. Pt initially had anxious affect, and reports since being discharged from [facility] earlier this month she has had  crying spells .  When asked what she believes is causing this spells, pt yelled  I don t know  then began to sob. She then stopped after about a minute and appeared anxious again. This repeated several times during the 20 minute assessment. Pt reports that when she was discharged recently, she was not stabilized and they let her go too soon. She reports she had asked to discharge and said she felt ready, because she felt the nurses were rude to her. Pt endorses decrease in appetite, wanting to sleep more, and hopelessness, in addition to her crying spells. When asked about SI pt denied a plan, and then yelled  I don t want to live but I don t want to die  and then covered her face and began sobbing. Pt endorses anxious symptoms of shortness of breath, chest tightness, and racing thoughts. She denies AH but endorses occasionally seeing VH in the form of  blobs . Pt denies any SIB,HI, or substance use.               Past Psychiatric History:   ***  Current symptoms:    Past Diagnoses:   Medication Trials:   Past/Current Providers:   Psychiatric Hospitalizations: "   Suicide Attempts:   Self Injury:   Civil Commitment:    ECT:    Past hospitalizations  Previous suicide attempts            Substance Use and History:   ***              Past Medical History:   PAST MEDICAL HISTORY:   Past Medical History:   Diagnosis Date    Adrenal mass 10/12/2015    Chen Null MD  They were incidentally discovered on the CAT scan of the abdomen from December 2011 which was done for evaluation of kidney stones. F/up CT of the abdomen done on 6/26/12 showed a stable 5 mm nodular opacity in the right lower lung lobe, adjacent to the diagram. Bilateral adrenal masses average density measured less than 0 Hounsfield units, consistent with benign etio    Age-related cataract of both eyes, unspecified age-related cataract type 03/27/2024    Anxiety     Anxiety     Arthritis     Borderline personality disorder (H)     Cancer (H)     COPD (chronic obstructive pulmonary disease) (H)     Depression     Depressive disorder     History of COVID-19 4/19/2021    Hyperlipidemia     Hypertension     Hypertension     Hyponatremia     Hypothyroidism     Malignant neoplasm of thyroid gland (H)     Chen Null MD  She had R hemithyroidectomy for a right neck tumor in 1994. According to the patient, the tumor was benign. She is not sure whether or not the tumor belonged to the thyroid gland. The surgery was done here at the [facility]. In January 2011, she was diagnosed with kidney stones. She was found to have an elevated calcium level of 11.7, with a PTH level of 368. Stone an    Primary hyperparathyroidism           PTSD (post-traumatic stress disorder)     Pulmonary embolism with infarction (H) 01/12/2021    Recurrent otitis media     Seizure disorder (H)     Sleep apnea     cpap at [facility]    Stroke (H)     Tobacco abuse 9/29/2015    Vertigo        PAST SURGICAL HISTORY:   Past Surgical History:   Procedure Laterality Date    ABDOMEN SURGERY      ADRENALECTOMY Left     BRAIN SURGERY       COLONOSCOPY N/A 5/7/2024    Procedure: WITH COLONOSCOPY with polypectomies;  Surgeon: Walt Lira MD;  Location: [facility] Main OR    CRANIOTOMY FOR TEMPORAL LOBECTOMY Right     x3 for seizure    DILATION AND CURETTAGE      ESOPHAGOSCOPY, GASTROSCOPY, DUODENOSCOPY (EGD), COMBINED N/A 5/7/2024    Procedure: and biopsies;  Surgeon: Walt Lira MD;  Location: [facility] Main OR    ESOPHAGOSCOPY, GASTROSCOPY, DUODENOSCOPY (EGD), DILATATION, COMBINED N/A 5/7/2024    Procedure: ESOPHAGOGASTRODUODENOSCOPY with esophageal dilation;  Surgeon: Walt Lira MD;  Location: [facility] Main OR    HEAD & NECK SURGERY      HYSTERECTOMY, PAP NO LONGER INDICATED N/A     LITHOTRIPSY      PARATHYROID GLAND SURGERY      resection of adenoma    REFRACTIVE SURGERY Bilateral     RELEASE CARPAL TUNNEL Bilateral     TONSILLECTOMY & ADENOIDECTOMY      TOTAL THYROIDECTOMY      TOTAL VAGINAL HYSTERECTOMY      39 years old. Benign             Family History:   FAMILY HISTORY:   Family History   Problem Relation Age of Onset    Chronic Obstructive Pulmonary Disease Mother     Other - See Comments Father         estranged    Skin Cancer Father     Lung Cancer Maternal Grandfather 76    Other - See Comments Brother         Missing since 1992    Alcoholism Brother     Diabetes Sister     Depression Sister     Alcoholism Sister     No Known Problems Sister         Half sister           Social History:   SOCIAL HISTORY:   Social History     Tobacco Use    Smoking status: Former     Types: Cigarettes     Passive exposure: Never    Smokeless tobacco: Never    Tobacco comments:     Quit about 5 days ago per pt   Substance Use Topics    Alcohol use: No     Comment: None.       ***         Physical ROS:   The 10 point Review of Systems is negative other than noted in the HPI or here.           Medications:     Current Facility-Administered Medications   Medication Dose Route Frequency Provider Last Rate Last Admin    ARIPiprazole (ABILIFY) tablet  15 mg  15 mg Oral QAM Isma Dodson DO   15 mg at 05/28/25 0736    Brivaracetam (BRIVIACT) tablet 150 mg  150 mg Oral BID Isma Dodson DO   150 mg at 05/28/25 0809    budesonide-formoterol (SYMBICORT/BREYNA) 160-4.5 MCG/ACT inhaler 2 puff  2 puff Inhalation BID Isma Dodson DO   2 puff at 05/28/25 0737    carBAMazepine (TEGretol) tablet 200 mg  200 mg Oral BID Isma Dodson DO   200 mg at 05/28/25 0736    carBAMazepine (TEGretol) tablet 400 mg  400 mg Oral Daily with lunch Isma Dodson DO        denosumab (PROLIA) injection 60 mg  60 mg Subcutaneous Q6 Months    60 mg at 11/14/24 1329    DULoxetine (CYMBALTA) DR capsule 120 mg  120 mg Oral Daily Isma Dodson DO   120 mg at 05/28/25 0737    levothyroxine (SYNTHROID/LEVOTHROID) tablet 300 mcg  300 mcg Oral QAM AC Isma Dodson DO   300 mcg at 05/28/25 0737    pantoprazole (PROTONIX) EC tablet 40 mg  40 mg Oral QAM AC Isma Dodson DO   40 mg at 05/28/25 0735    prazosin (MINIPRESS) capsule 2 mg  2 mg Oral At Bedtime Isma Dodson DO   2 mg at 05/27/25 2121    pregabalin (LYRICA) capsule 25 mg  25 mg Oral BID Isma Dodson DO   25 mg at 05/28/25 0735    QUEtiapine (SEROquel) 150 mg  150 mg Oral At Bedtime Isma Dodson DO   Stopped at 05/27/25 2125    rosuvastatin (CRESTOR) tablet 20 mg  20 mg Oral At Bedtime Isma Dodson DO   20 mg at 05/27/25 2122    [START ON 5/29/2025] warfarin ANTICOAGULANT (COUMADIN/JANTOVEN) tablet 5 mg  5 mg Oral Weekly Isma Dodson DO        warfarin ANTICOAGULANT (COUMADIN/JANTOVEN) tablet 7.5 mg  7.5 mg Oral Once per day on Sunday Monday Tuesday Wednesday Friday Saturday Isma Dodson DO   7.5 mg at 05/27/25 2120              Allergies:     Allergies   Allergen Reactions    Aspirin Shortness Of Breath    Bees Anaphylaxis    Lamotrigine Dizziness and Unknown    Latex Itching    Phenobarbital      aggression    Vistaril [Hydroxyzine] Other  (See Comments)     Sluggish, unable to wake up    Gabapentin Rash          Labs:     Recent Results (from the past 48 hours)   ECG 12-LEAD WITH MUSE (LHE)    Collection Time: 05/27/25  2:36 PM   Result Value Ref Range    Systolic Blood Pressure  mmHg    Diastolic Blood Pressure  mmHg    Ventricular Rate 81 BPM    Atrial Rate 81 BPM    OR Interval 224 ms    QRS Duration 90 ms     ms    QTc 462 ms    P Axis 60 degrees    R AXIS 0 degrees    T Axis 47 degrees    Interpretation ECG       Sinus rhythm with 1st degree A-V block  Otherwise normal ECG  When compared with ECG of 11-May-2025 18:47,  No significant change was found  Confirmed by SEE ED PROVIDER NOTE FOR, ECG INTERPRETATION (4000),  DAVID WATKINS ([id number]) on 5/28/2025 1:07:41 AM     Basic metabolic panel    Collection Time: 05/27/25  2:50 PM   Result Value Ref Range    Sodium 140 135 - 145 mmol/L    Potassium 3.7 3.4 - 5.3 mmol/L    Chloride 103 98 - 107 mmol/L    Carbon Dioxide (CO2) 24 22 - 29 mmol/L    Anion Gap 13 7 - 15 mmol/L    Urea Nitrogen 8.8 8.0 - 23.0 mg/dL    Creatinine 0.73 0.51 - 0.95 mg/dL    GFR Estimate >90 >60 mL/min/1.73m2    Calcium 9.3 8.8 - 10.4 mg/dL    Glucose 116 (H) 70 - 99 mg/dL   Troponin T, High Sensitivity    Collection Time: 05/27/25  2:50 PM   Result Value Ref Range    Troponin T, High Sensitivity 11 <=14 ng/L   CBC with platelets and differential    Collection Time: 05/27/25  2:50 PM   Result Value Ref Range    WBC Count 5.9 4.0 - 11.0 10e3/uL    RBC Count 5.34 (H) 3.80 - 5.20 10e6/uL    Hemoglobin 14.5 11.7 - 15.7 g/dL    Hematocrit 44.1 35.0 - 47.0 %    MCV 83 78 - 100 fL    MCH 27.2 26.5 - 33.0 pg    MCHC 32.9 31.5 - 36.5 g/dL    RDW 13.5 10.0 - 15.0 %    Platelet Count 237 150 - 450 10e3/uL    % Neutrophils 65 %    % Lymphocytes 27 %    % Monocytes 7 %    % Eosinophils 1 %    % Basophils 1 %    % Immature Granulocytes 0 %    NRBCs per 100 WBC 0 <1 /100    Absolute Neutrophils 3.9 1.6 - 8.3 10e3/uL    Absolute  "Lymphocytes 1.6 0.8 - 5.3 10e3/uL    Absolute Monocytes 0.4 0.0 - 1.3 10e3/uL    Absolute Eosinophils 0.0 0.0 - 0.7 10e3/uL    Absolute Basophils 0.0 0.0 - 0.2 10e3/uL    Absolute Immature Granulocytes 0.0 <=0.4 10e3/uL    Absolute NRBCs 0.0 10e3/uL   Troponin T, High Sensitivity    Collection Time: 05/27/25  5:17 PM   Result Value Ref Range    Troponin T, High Sensitivity 10 <=14 ng/L   INR    Collection Time: 05/27/25  7:32 PM   Result Value Ref Range    INR 2.96 (H) 0.85 - 1.15    PT 30.7 (H) 11.8 - 14.8 Seconds   INR    Collection Time: 05/28/25  7:12 AM   Result Value Ref Range    INR 2.81 (H) 0.85 - 1.15    PT 29.5 (H) 11.8 - 14.8 Seconds   Extra Green Top (Lithium Heparin) Tube    Collection Time: 05/28/25  7:12 AM   Result Value Ref Range    Hold Specimen JIC    Extra Purple Top Tube    Collection Time: 05/28/25  7:12 AM   Result Value Ref Range    Hold Specimen JIC           Physical and Psychiatric Examination:     /61 (BP Location: Right arm, Patient Position: Sitting, Cuff Size: Adult Large)   Pulse 63   Temp 98.2  F (36.8  C) (Axillary)   Resp 20   Ht 1.676 m (5' 6\")   Wt (!) 150.6 kg (332 lb)   LMP  (LMP Unknown)   SpO2 97%   BMI 53.59 kg/m    Weight is 332 lbs 0 oz  Body mass index is 53.59 kg/m .    Physical Exam:  I have reviewed the physical exam as documented by by the medical team and agree with findings and assessment and have no additional findings to add at this time.         MSE:   Appearance: { :122091}  Attitude:  { :782222}  Eye Contact:  { :601168}  Mood:  { :547192::\"\"fair\"\"}  Affect:  { :351821::\"slightly restricted\":1}  Speech:  { :278801}  Psychomotor Behavior:  { :750848}  Muscle strength and tone: ***  Thought Process:  { :774963}  Associations:  { :924899}  Thought Content:  { :106052}  Insight:  { :364282}  Judgement:  { :361092}  Oriented to:  { :474721}  Attention Span and Concentration:  { :716281}  Recent and Remote Memory:  { :076713}             DSM-5 " Diagnosis:   {DSM5  Diagnosis:250291}          Assessment:   ***    The patient appears to be suffering from ***  Symptoms supporting *** include the following:  ***            Summary of Recommendations:   ***          Page me or re-consult psychiatry as needed.       Ashley England, PELON, SHAHRZAD  Consult/Liaison Psychiatry  [facility]   Contact information available via [facility] Paging/Directory.  If I am not available, please call [facility] intake ([phone])

## 2025-05-28 NOTE — CONSULTS
"      Initial Psychiatric Consult   Consult date: May 28, 2025         Reason for Consult, requesting source:    Pt boarding in ED for IP  admission. Recently had med changes.    Requesting source: Saad Sterling    Labs and imaging reviewed. Provider contacted.        HPI:   Psychiatry seeing patient today regarding boarding in ED for IP MH admission.       Valentina Olmedo presents to the ED via EMS. Patient is presenting to the ED for the following concerns: Suicidal ideation, Depression, Health stressors. Factors that make the mental health crisis life threatening or complex are: Pt sent to ED from  PCP after endorsing SI. Pt denies a plan but is sad and tearful in triage stating, \"I need help\". Upon DEC assessment, pt alert and oriented but is highly emotionally labile and is a poor history/has limited insight. Pt initially had anxious affect, and reports since being discharged from [facility] earlier this month she has had  crying spells .  When asked what she believes is causing this spells, pt yelled  I don t know  then began to sob. She then stopped after about a minute and appeared anxious again. This repeated several times during the 20 minute assessment. Pt reports that when she was discharged recently, she was not stabilized and they let her go too soon. She reports she had asked to discharge and said she felt ready, because she felt the nurses were rude to her. Pt endorses decrease in appetite, wanting to sleep more, and hopelessness, in addition to her crying spells. When asked about SI pt denied a plan, and then yelled  I don t want to live but I don t want to die  and then covered her face and began sobbing. Pt endorses anxious symptoms of shortness of breath, chest tightness, and racing thoughts. She denies AH but endorses occasionally seeing VH in the form of  blobs . Pt denies any SIB,HI, or substance use.     Today, patient reports, \"I'm scared here! Where am I?\" However, she knows she is at [facility], her name and the year. She reports to be experiencing AH. When asked if she has thoughts of harming others, she reports, \"I would never harm someone\" and \"I hope not\" when responding to thoughts of harming herself. She shares that things have been \"really bad\" at home, and explains this as \"I'm having crying spells.\"           Past Psychiatric History:   Pt has historical dx of PTSD, Anxiety, Depression, and Schizoaffective Disorder.This is pt's 8th ED visit in the past 30 days. Valentina has primary care established at [facility] with Jose Maria Mroin and psychiatric med management at [facility] with Donna Galan. Pt also has  through [facility]. Pt has hx of  IP hospitalizations, with most recent being on 5/13 of this month.             Substance Use and History:     Tobacco Use    Smoking status: Former     Types: Cigarettes     Passive exposure: Never    Smokeless tobacco: Never    Tobacco comments:     Quit about 5 days ago per pt   Substance Use Topics    Alcohol use: No     Comment: None.                 Past Medical History:   PAST MEDICAL HISTORY:   Past Medical History:   Diagnosis Date    Adrenal mass 10/12/2015    Chen Null MD  They were incidentally discovered on the CAT scan of the abdomen from December 2011 which was done for evaluation of kidney stones. F/up CT of the abdomen done on 6/26/12 showed a stable 5 mm nodular opacity in the right lower lung lobe, adjacent to the diagram. Bilateral adrenal masses average density measured less than 0 Hounsfield units, consistent with benign etio    Age-related cataract of both eyes, unspecified age-related cataract type 03/27/2024    Anxiety     Anxiety     Arthritis     Borderline personality disorder (H)     Cancer (H)     COPD (chronic obstructive pulmonary disease) (H)     Depression     Depressive disorder     History of COVID-19 4/19/2021    Hyperlipidemia     Hypertension  "    Hypertension     Hyponatremia     Hypothyroidism     Malignant neoplasm of thyroid gland (H)     Chen Null MD  She had R hemithyroidectomy for a right neck tumor in 1994. According to the patient, the tumor was benign. She is not sure whether or not the tumor belonged to the thyroid gland. The surgery was done here at the [facility]. In January 2011, she was diagnosed with kidney stones. She was found to have an elevated calcium level of 11.7, with a PTH level of 368. Stone an    Primary hyperparathyroidism           PTSD (post-traumatic stress disorder)     Pulmonary embolism with infarction (H) 01/12/2021    Recurrent otitis media     Seizure disorder (H)     Sleep apnea     cpap at [facility]    Stroke (H)     Tobacco abuse 9/29/2015    Vertigo        PAST SURGICAL HISTORY:   Past Surgical History:   Procedure Laterality Date    ABDOMEN SURGERY      ADRENALECTOMY Left     BRAIN SURGERY      COLONOSCOPY N/A 5/7/2024    Procedure: WITH COLONOSCOPY with polypectomies;  Surgeon: Walt Lira MD;  Location: [facility] Main OR    CRANIOTOMY FOR TEMPORAL LOBECTOMY Right     x3 for seizure    DILATION AND CURETTAGE      ESOPHAGOSCOPY, GASTROSCOPY, DUODENOSCOPY (EGD), COMBINED N/A 5/7/2024    Procedure: and biopsies;  Surgeon: Walt Lira MD;  Location: [facility] OR    ESOPHAGOSCOPY, GASTROSCOPY, DUODENOSCOPY (EGD), DILATATION, COMBINED N/A 5/7/2024    Procedure: ESOPHAGOGASTRODUODENOSCOPY with esophageal dilation;  Surgeon: Walt Lira MD;  Location: [facility] Main OR    HEAD & NECK SURGERY      HYSTERECTOMY, PAP NO LONGER INDICATED N/A     LITHOTRIPSY      PARATHYROID GLAND SURGERY      resection of adenoma    REFRACTIVE SURGERY Bilateral     RELEASE CARPAL TUNNEL Bilateral     TONSILLECTOMY & ADENOIDECTOMY      TOTAL THYROIDECTOMY      TOTAL VAGINAL HYSTERECTOMY      39 years old. Benign             Family History:   FAMILY HISTORY:   Family History   Problem Relation Age of Onset    Chronic  Obstructive Pulmonary Disease Mother     Other - See Comments Father         estranged    Skin Cancer Father     Lung Cancer Maternal Grandfather 76    Other - See Comments Brother         Missing since 1992    Alcoholism Brother     Diabetes Sister     Depression Sister     Alcoholism Sister     No Known Problems Sister         Half sister          Physical ROS:   The 10 point Review of Systems is negative other than noted in the HPI or here.           Medications:     Current Facility-Administered Medications   Medication Dose Route Frequency Provider Last Rate Last Admin    ARIPiprazole (ABILIFY) tablet 15 mg  15 mg Oral QAM Isma Dodson DO   15 mg at 05/28/25 0736    Brivaracetam (BRIVIACT) tablet 150 mg  150 mg Oral BID Isma Dodson DO   150 mg at 05/28/25 0809    budesonide-formoterol (SYMBICORT/BREYNA) 160-4.5 MCG/ACT inhaler 2 puff  2 puff Inhalation BID Isma Dodson DO   2 puff at 05/28/25 0737    carBAMazepine (TEGretol) tablet 200 mg  200 mg Oral BID Isma Dodson DO   200 mg at 05/28/25 0736    carBAMazepine (TEGretol) tablet 400 mg  400 mg Oral Daily with lunch Isma Dodson DO   400 mg at 05/28/25 1200    denosumab (PROLIA) injection 60 mg  60 mg Subcutaneous Q6 Months    60 mg at 11/14/24 1329    DULoxetine (CYMBALTA) DR capsule 120 mg  120 mg Oral Daily Isma Dodson DO   120 mg at 05/28/25 0737    levothyroxine (SYNTHROID/LEVOTHROID) tablet 300 mcg  300 mcg Oral QAM AC Isma Dodson DO   300 mcg at 05/28/25 0737    pantoprazole (PROTONIX) EC tablet 40 mg  40 mg Oral QAM AC Isma Dodson DO   40 mg at 05/28/25 0735    prazosin (MINIPRESS) capsule 2 mg  2 mg Oral At Bedtime Isma Dodson DO   2 mg at 05/27/25 2121    pregabalin (LYRICA) capsule 25 mg  25 mg Oral BID Isma Dodson DO   25 mg at 05/28/25 0735    QUEtiapine (SEROquel) 150 mg  150 mg Oral At Bedtime Isma Dodson DO   Stopped at 05/27/25 2125    rosuvastatin  (CRESTOR) tablet 20 mg  20 mg Oral At Bedtime Isma Dodson, DO   20 mg at 05/27/25 2122    [START ON 5/29/2025] warfarin ANTICOAGULANT (COUMADIN/JANTOVEN) tablet 5 mg  5 mg Oral Weekly Isma Dodson DO        warfarin ANTICOAGULANT (COUMADIN/JANTOVEN) tablet 7.5 mg  7.5 mg Oral Once per day on Sunday Monday Tuesday Wednesday Friday Saturday Isma Dodson, DO   7.5 mg at 05/27/25 2120              Allergies:     Allergies   Allergen Reactions    Aspirin Shortness Of Breath    Bees Anaphylaxis    Lamotrigine Dizziness and Unknown    Latex Itching    Phenobarbital      aggression    Vistaril [Hydroxyzine] Other (See Comments)     Sluggish, unable to wake up    Gabapentin Rash          Labs:     Recent Results (from the past 48 hours)   ECG 12-LEAD WITH MUSE (LHE)    Collection Time: 05/27/25  2:36 PM   Result Value Ref Range    Systolic Blood Pressure  mmHg    Diastolic Blood Pressure  mmHg    Ventricular Rate 81 BPM    Atrial Rate 81 BPM    FL Interval 224 ms    QRS Duration 90 ms     ms    QTc 462 ms    P Axis 60 degrees    R AXIS 0 degrees    T Axis 47 degrees    Interpretation ECG       Sinus rhythm with 1st degree A-V block  Otherwise normal ECG  When compared with ECG of 11-May-2025 18:47,  No significant change was found  Confirmed by SEE ED PROVIDER NOTE FOR, ECG INTERPRETATION ([id number]),  DAVID WATKINS ([id number]) on 5/28/2025 1:07:41 AM     Basic metabolic panel    Collection Time: 05/27/25  2:50 PM   Result Value Ref Range    Sodium 140 135 - 145 mmol/L    Potassium 3.7 3.4 - 5.3 mmol/L    Chloride 103 98 - 107 mmol/L    Carbon Dioxide (CO2) 24 22 - 29 mmol/L    Anion Gap 13 7 - 15 mmol/L    Urea Nitrogen 8.8 8.0 - 23.0 mg/dL    Creatinine 0.73 0.51 - 0.95 mg/dL    GFR Estimate >90 >60 mL/min/1.73m2    Calcium 9.3 8.8 - 10.4 mg/dL    Glucose 116 (H) 70 - 99 mg/dL   Troponin T, High Sensitivity    Collection Time: 05/27/25  2:50 PM   Result Value Ref Range    Troponin T, High  Sensitivity 11 <=14 ng/L   CBC with platelets and differential    Collection Time: 05/27/25  2:50 PM   Result Value Ref Range    WBC Count 5.9 4.0 - 11.0 10e3/uL    RBC Count 5.34 (H) 3.80 - 5.20 10e6/uL    Hemoglobin 14.5 11.7 - 15.7 g/dL    Hematocrit 44.1 35.0 - 47.0 %    MCV 83 78 - 100 fL    MCH 27.2 26.5 - 33.0 pg    MCHC 32.9 31.5 - 36.5 g/dL    RDW 13.5 10.0 - 15.0 %    Platelet Count 237 150 - 450 10e3/uL    % Neutrophils 65 %    % Lymphocytes 27 %    % Monocytes 7 %    % Eosinophils 1 %    % Basophils 1 %    % Immature Granulocytes 0 %    NRBCs per 100 WBC 0 <1 /100    Absolute Neutrophils 3.9 1.6 - 8.3 10e3/uL    Absolute Lymphocytes 1.6 0.8 - 5.3 10e3/uL    Absolute Monocytes 0.4 0.0 - 1.3 10e3/uL    Absolute Eosinophils 0.0 0.0 - 0.7 10e3/uL    Absolute Basophils 0.0 0.0 - 0.2 10e3/uL    Absolute Immature Granulocytes 0.0 <=0.4 10e3/uL    Absolute NRBCs 0.0 10e3/uL   Troponin T, High Sensitivity    Collection Time: 05/27/25  5:17 PM   Result Value Ref Range    Troponin T, High Sensitivity 10 <=14 ng/L   INR    Collection Time: 05/27/25  7:32 PM   Result Value Ref Range    INR 2.96 (H) 0.85 - 1.15    PT 30.7 (H) 11.8 - 14.8 Seconds   INR    Collection Time: 05/28/25  7:12 AM   Result Value Ref Range    INR 2.81 (H) 0.85 - 1.15    PT 29.5 (H) 11.8 - 14.8 Seconds   Extra Green Top (Lithium Heparin) Tube    Collection Time: 05/28/25  7:12 AM   Result Value Ref Range    Hold Specimen JIC    Extra Purple Top Tube    Collection Time: 05/28/25  7:12 AM   Result Value Ref Range    Hold Specimen JIC    TSH with free T4 reflex    Collection Time: 05/28/25  7:12 AM   Result Value Ref Range    TSH 0.01 (L) 0.30 - 4.20 uIU/mL   T4 free    Collection Time: 05/28/25  7:12 AM   Result Value Ref Range    Free T4 1.61 0.90 - 1.70 ng/dL   Influenza A/B, RSV and SARS-CoV2 PCR (COVID-19) Nose    Collection Time: 05/28/25 10:41 AM    Specimen: Nose; Swab   Result Value Ref Range    Influenza A PCR Negative Negative     "Influenza B PCR Negative Negative    RSV PCR Negative Negative    SARS CoV2 PCR Negative Negative   Urine Drug Screen Panel    Collection Time: 05/28/25 10:59 AM   Result Value Ref Range    Amphetamines Urine Screen Negative Screen Negative    Barbituates Urine Screen Negative Screen Negative    Benzodiazepine Urine Screen Positive (A) Screen Negative    Cannabinoids Urine Screen Negative Screen Negative    Cocaine Urine Screen Negative Screen Negative    Fentanyl Qual Urine Screen Negative Screen Negative    Opiates Urine Screen Negative Screen Negative    PCP Urine Screen Negative Screen Negative          Physical and Psychiatric Examination:     /61 (BP Location: Right arm, Patient Position: Sitting, Cuff Size: Adult Large)   Pulse 63   Temp 98.2  F (36.8  C) (Axillary)   Resp 20   Ht 1.676 m (5' 6\")   Wt (!) 150.6 kg (332 lb)   LMP  (LMP Unknown)   SpO2 97%   BMI 53.59 kg/m    Weight is 332 lbs 0 oz  Body mass index is 53.59 kg/m .    Physical Exam:  I have reviewed the physical exam as documented by by the medical team and agree with findings and assessment and have no additional findings to add at this time.         MSE:   Tearful, somewhat uncooperative. Covers face intermittently with hands. Endorses AH, but denies SI/SIB. Insight is somewhat poor, judgement is fair at time of meeting.              DSM-5 Diagnosis:   PTSD  Borderline Personality Disorder  Schizoaffective Disorder, depressed type            Assessment:   65 year old female presents to the Emergency Department for evaluation of anxiety and vague suicidal thoughts.  Patient is medically cleared for behavioral health treatment and management.  She is quite anxious and I did give her some Ativan here, due to the fact the patient has an allergy listed to Vistaril.  This did improve her some, but she is still quite anxious, and worried that if she goes home she may attempt to hurt herself.  I had her evaluated by DEC, and at this time " we are unable to get the patient to give a reliable safety plan, and therefore will require admission for inpatient behavioral health management.  She is otherwise stable here without acute other abnormality.  Patient is awaiting inpatient behavioral health placement at this time     Psychiatry seeing patient today regarding boarding for IP MH admission.     Patient was intermittently tearful during our meeting and was difficult to fully engage. She reports AH, but did not appear responding. Patient reports to have been taking her psychiatric medications, but experiencing tremor due to Abilify, so provider was planning to discontinue.     Seroquel can increase exposure to metabolite of Tegretol; Tegretol can decrease efficacy of Seroquel and Abilify. Cymbalta can increase serum concentrations of Abilify.             Summary of Recommendations:   1) Given reports of tremor due to Abilify, could taper off. Will defer to IP provider for additional changes.  -No tremor noted.    2) Added Zyprexa 5 mg BID PRN for intermittent agitation, anxiety and reports of AH.     3) Given patient's lability contributing to her lack of ability to safety plan at this time, can continue with plan for IP psychiatry.               Page me or re-consult psychiatry as needed.       Ashley England, WALKERP, APRN  Consult/Liaison Psychiatry  [facility]   Contact information available via [facility] Paging/Directory.  If I am not available, please call [facility] intake ([phone])

## 2025-05-28 NOTE — TELEPHONE ENCOUNTER
2:15 AM Intake called [facility]. Per Ed, they are capped.     3:40 AM Intake called [facility] and reviewed this pt's chart with CRN. Charge reports the unit would be too acute to admit the pt with a wheelchair.          R: [place] [facility]patient Bed Call Log 05/28/2025 @ 12:00 AM:  Intake has called facilities that have not updated the bed status within the last 12 hours.         ADULTS:    METRO:    [facility] is posting 0 beds.  [facility] is posting 0 beds. [phone] Per call @ 12:21AM, no overnight bed reviews. C/b after 10:00 AM.    [facility] ([facility]) is posting 0 beds.   [facility] is posting 0 beds. No high school or miguel psych. [phone] Per call at 6:27pm- [facility] reports they are at capacity.   [facility] ([facility]) is posting 0 beds.  [facility] () is posting 0 beds. [phone]: Per call at 11:45 PM to [facility] is posting 0 beds. Ages 18-35, labs required. [phone]: Per call @ 11:41 PM to Vik[facility], only 1 ADOL bed available.  [facility] ([facility]) is posting 0 beds.  [facility] ([facility]) is posting 0 beds. [phone]       Pt remains on work list pending appropriate bed availability.

## 2025-05-28 NOTE — ED PROVIDER NOTES
[facility] EMERGENCY DEPARTMENT   ED Mental Health Observation - Discharge Note (Brief)    Valentina Olmedo is boarding in the ED after undergoing evaluation for Mental health crisis  Please see the daily progress note for this patient's presentation, physical examination, and work up.    Upon reevaluation and discussion with DEC , we believe patient has shown insufficient improvement and thus will be Transferred.    EMERGENCY DEPARTMENT OBSERVATION status ended at 4:18 PM 5/28/2025    Discharge Management Time: < 30 minutes    1. Suicidal thoughts    2. Anxiety    3. Depression, unspecified depression type    4. Acute cystitis without hematuria        DO Leonela Grier Paul, DO  05/28/25 4536

## 2025-05-28 NOTE — ED NOTES
Called [facility], spoke with Zari BOO and gave report. EMS called, transport will arrive in 1.5 hours.

## 2025-05-29 ENCOUNTER — DOCUMENTATION ONLY (OUTPATIENT)
Dept: ANTICOAGULATION | Facility: CLINIC | Age: 65
End: 2025-05-29
Payer: COMMERCIAL

## 2025-05-29 ENCOUNTER — TELEPHONE (OUTPATIENT)
Dept: FAMILY MEDICINE | Facility: CLINIC | Age: 65
End: 2025-05-29
Payer: COMMERCIAL

## 2025-05-29 VITALS
OXYGEN SATURATION: 95 % | BODY MASS INDEX: 54.18 KG/M2 | SYSTOLIC BLOOD PRESSURE: 125 MMHG | WEIGHT: 293 LBS | TEMPERATURE: 97.5 F | DIASTOLIC BLOOD PRESSURE: 77 MMHG | HEART RATE: 67 BPM | RESPIRATION RATE: 17 BRPM

## 2025-05-29 LAB
BACTERIA UR CULT: ABNORMAL
CHOLEST SERPL-MCNC: 201 MG/DL
EST. AVERAGE GLUCOSE BLD GHB EST-MCNC: 111 MG/DL
HBA1C MFR BLD: 5.5 %
HDLC SERPL-MCNC: 98 MG/DL
INR PPP: 2.52 (ref 0.85–1.15)
INR PPP: 2.65 (ref 0.85–1.15)
LDLC SERPL CALC-MCNC: 91 MG/DL
NONHDLC SERPL-MCNC: 103 MG/DL
PROTHROMBIN TIME: 27.4 SECONDS (ref 11.8–14.8)
PROTHROMBIN TIME: 28.4 SECONDS (ref 11.8–14.8)
TRIGL SERPL-MCNC: 61 MG/DL

## 2025-05-29 PROCEDURE — 99254 IP/OBS CNSLTJ NEW/EST MOD 60: CPT

## 2025-05-29 PROCEDURE — 83036 HEMOGLOBIN GLYCOSYLATED A1C: CPT | Performed by: PSYCHIATRY & NEUROLOGY

## 2025-05-29 PROCEDURE — 99223 1ST HOSP IP/OBS HIGH 75: CPT | Mod: AI | Performed by: PSYCHIATRY & NEUROLOGY

## 2025-05-29 PROCEDURE — 85610 PROTHROMBIN TIME: CPT | Performed by: PSYCHIATRY & NEUROLOGY

## 2025-05-29 PROCEDURE — 85610 PROTHROMBIN TIME: CPT

## 2025-05-29 PROCEDURE — 36415 COLL VENOUS BLD VENIPUNCTURE: CPT

## 2025-05-29 PROCEDURE — 250N000013 HC RX MED GY IP 250 OP 250 PS 637

## 2025-05-29 PROCEDURE — 82465 ASSAY BLD/SERUM CHOLESTEROL: CPT | Performed by: PSYCHIATRY & NEUROLOGY

## 2025-05-29 PROCEDURE — 36415 COLL VENOUS BLD VENIPUNCTURE: CPT | Performed by: PSYCHIATRY & NEUROLOGY

## 2025-05-29 PROCEDURE — 250N000013 HC RX MED GY IP 250 OP 250 PS 637: Performed by: PSYCHIATRY & NEUROLOGY

## 2025-05-29 PROCEDURE — 124N000002 HC R&B MH UMMC

## 2025-05-29 PROCEDURE — 250N000012 HC RX MED GY IP 250 OP 636 PS 637: Performed by: PSYCHIATRY & NEUROLOGY

## 2025-05-29 PROCEDURE — 250N000011 HC RX IP 250 OP 636: Performed by: PSYCHIATRY & NEUROLOGY

## 2025-05-29 RX ORDER — WARFARIN SODIUM 5 MG/1
5 TABLET ORAL
Status: COMPLETED | OUTPATIENT
Start: 2025-05-29 | End: 2025-05-29

## 2025-05-29 RX ORDER — NITROFURANTOIN 25; 75 MG/1; MG/1
100 CAPSULE ORAL EVERY 12 HOURS SCHEDULED
Status: COMPLETED | OUTPATIENT
Start: 2025-05-29 | End: 2025-06-02

## 2025-05-29 RX ORDER — CARBAMAZEPINE 200 MG/1
400 TABLET ORAL DAILY
Status: DISPENSED | OUTPATIENT
Start: 2025-05-30

## 2025-05-29 RX ADMIN — BUDESONIDE AND FORMOTEROL FUMARATE DIHYDRATE 2 PUFF: 160; 4.5 AEROSOL RESPIRATORY (INHALATION) at 20:53

## 2025-05-29 RX ADMIN — NITROFURANTOIN MONOHYDRATE/MACROCRYSTALS 100 MG: 75; 25 CAPSULE ORAL at 20:53

## 2025-05-29 RX ADMIN — CARBAMAZEPINE 400 MG: 400 TABLET, EXTENDED RELEASE ORAL at 12:29

## 2025-05-29 RX ADMIN — BUDESONIDE AND FORMOTEROL FUMARATE DIHYDRATE 2 PUFF: 160; 4.5 AEROSOL RESPIRATORY (INHALATION) at 09:17

## 2025-05-29 RX ADMIN — LEVOTHYROXINE SODIUM 300 MCG: 0.15 TABLET ORAL at 09:18

## 2025-05-29 RX ADMIN — Medication 100 MCG: at 09:16

## 2025-05-29 RX ADMIN — CARBAMAZEPINE 200 MG: 200 TABLET ORAL at 09:19

## 2025-05-29 RX ADMIN — OLANZAPINE 10 MG: 10 TABLET, FILM COATED ORAL at 11:06

## 2025-05-29 RX ADMIN — NITROFURANTOIN MONOHYDRATE/MACROCRYSTALS 100 MG: 75; 25 CAPSULE ORAL at 11:01

## 2025-05-29 RX ADMIN — DULOXETINE 120 MG: 30 CAPSULE, DELAYED RELEASE ORAL at 09:16

## 2025-05-29 RX ADMIN — Medication 150 MG: at 21:07

## 2025-05-29 RX ADMIN — ROSUVASTATIN 20 MG: 20 TABLET, FILM COATED ORAL at 09:18

## 2025-05-29 RX ADMIN — BRIVARACETAM 150 MG: 50 TABLET, FILM COATED ORAL at 20:52

## 2025-05-29 RX ADMIN — PREGABALIN 25 MG: 25 CAPSULE ORAL at 09:16

## 2025-05-29 RX ADMIN — BRIVARACETAM 150 MG: 50 TABLET, FILM COATED ORAL at 09:16

## 2025-05-29 RX ADMIN — CARBAMAZEPINE 200 MG: 200 TABLET ORAL at 20:52

## 2025-05-29 RX ADMIN — FLUTICASONE PROPIONATE 1 SPRAY: 50 SPRAY, METERED NASAL at 09:16

## 2025-05-29 RX ADMIN — PREGABALIN 25 MG: 25 CAPSULE ORAL at 20:53

## 2025-05-29 RX ADMIN — ONDANSETRON 4 MG: 4 TABLET, ORALLY DISINTEGRATING ORAL at 11:01

## 2025-05-29 RX ADMIN — FLUTICASONE PROPIONATE 1 SPRAY: 50 SPRAY, METERED NASAL at 20:54

## 2025-05-29 RX ADMIN — PANTOPRAZOLE SODIUM 40 MG: 40 TABLET, DELAYED RELEASE ORAL at 09:16

## 2025-05-29 RX ADMIN — WARFARIN SODIUM 5 MG: 5 TABLET ORAL at 17:53

## 2025-05-29 RX ADMIN — TRAZODONE HYDROCHLORIDE 50 MG: 50 TABLET ORAL at 20:57

## 2025-05-29 RX ADMIN — PRAZOSIN HYDROCHLORIDE 2 MG: 2 CAPSULE ORAL at 21:07

## 2025-05-29 RX ADMIN — CYANOCOBALAMIN TAB 1000 MCG 1000 MCG: 1000 TAB at 09:16

## 2025-05-29 RX ADMIN — PREDNISONE 20 MG: 5 TABLET ORAL at 09:16

## 2025-05-29 RX ADMIN — SENNOSIDES 2 TABLET: 8.6 TABLET ORAL at 09:19

## 2025-05-29 RX ADMIN — Medication 15 MG: at 09:16

## 2025-05-29 RX ADMIN — SENNOSIDES 2 TABLET: 8.6 TABLET ORAL at 20:52

## 2025-05-29 ASSESSMENT — ACTIVITIES OF DAILY LIVING (ADL)
HYGIENE/GROOMING: WITH ASSISTANCE
ADLS_ACUITY_SCORE: 64
ADLS_ACUITY_SCORE: 88
ORAL_HYGIENE: INDEPENDENT
ADLS_ACUITY_SCORE: 74
LAUNDRY: UNABLE TO COMPLETE
ADLS_ACUITY_SCORE: 64
DRESS: SCRUBS (BEHAVIORAL HEALTH);WITH ASSISTANCE
ADLS_ACUITY_SCORE: 74
ADLS_ACUITY_SCORE: 64
ORAL_HYGIENE: INDEPENDENT
HYGIENE/GROOMING: WITH ASSISTANCE
ADLS_ACUITY_SCORE: 74
ADLS_ACUITY_SCORE: 74
ADLS_ACUITY_SCORE: 88
ADLS_ACUITY_SCORE: 64
ADLS_ACUITY_SCORE: 88
ADLS_ACUITY_SCORE: 64
ADLS_ACUITY_SCORE: 74
ADLS_ACUITY_SCORE: 64
ADLS_ACUITY_SCORE: 74
ADLS_ACUITY_SCORE: 98
ADLS_ACUITY_SCORE: 98
ADLS_ACUITY_SCORE: 64
DRESS: INDEPENDENT
ADLS_ACUITY_SCORE: 88
ADLS_ACUITY_SCORE: 64
ADLS_ACUITY_SCORE: 64
ADLS_ACUITY_SCORE: 74
ADLS_ACUITY_SCORE: 64
LAUNDRY: UNABLE TO COMPLETE

## 2025-05-29 NOTE — PLAN OF CARE
05/29/25 1209   Individualization/Patient Specific Goals   Patient Personal Strengths resourceful   Patient Vulnerabilities poor physical health   Anxieties, Fears or Concerns has many somatic complaints   Individualized Care Needs thorough medical evaluation of reality of physical health needs   Patient/Family-Specific Goals (Include Timeframe) Stabilze adn return to the community.   Interprofessional Rounds   Summary Pt presented with depression, anxiety, and suicidal ideation with  no plan. Pt has a multidue to somatic complaints.   Participants CTC;psychiatrist;nursing;other (see comments)   Behavioral Team Discussion   Participants Benjamin Patterson MD, Nadeen TAMAYO, Gina Casalenda RN, Daniel Nickerson PA student   Progress pt continues with multiple requests and needs for connection  seeking   Anticipated length of stay does not need a long stay   Continued Stay Criteria/Rationale team is discussing if pt needs acute hospitalization   Medical/Physical has a multitude of physical comlaints and a long problem list   Precautions suicide   Plan Internal Medicine work up, psychiatric evaluation   Rationale for change in precautions or plan na   Safety Plan will be completed by unit therapist   Anticipated Discharge Disposition home with family     Goal Outcome Evaluation:

## 2025-05-29 NOTE — PROGRESS NOTES
RAJEEV Montes at [phone](home care) called and left a voicemail for [facility] with notification that pt is currently admitted in hospital. An INR level is due today 5/29/25. Will monitor and watch for notification of when pt is discharged to determine next INR check.

## 2025-05-29 NOTE — PLAN OF CARE
INITIAL PSYCHOSOCIAL ASSESSMENT AND NOTE    Information for assessment was obtained from:       []Patient     []Parent     []Community provider    [x]Hospital records   []Other     []Guardian        I approached pt mid-afternoon and she was awke in bed in the dark.  She agreed to interview but stayed in bed and agreed to the lights on.  Pt was difficult to undersntad with the first few questions.  Then she fell asleep and started snoring so I terminated the attempt to interview.     Presenting Problem:  Patient is a 65 year old female who uses she/her. Patient was admitted to [facility] on 5/28/2025 [facility] 30[facility] voluntarily.    Presenting issues and presentation for admit:   Pt presented to [facility] on 5/27/25 for evaluation of depression.  Pt reported suicidal ideation.  Pt was transferred to [facility] on 5/28/25.        The following areas have been assessed:    History of Mental Health and Chemical Dependency:  Mental Health History:  Patient has a historical diagnosis of   Schizoaffective disorder  Borderline Personality DO  PTSD  Bipolar Affective DO      .   The patient has not a history of suicide attempts .   Patient  has not a history of engaged in non-suicidal self-injury .     Previous psychiatric hospitalizations and treatments (including outpatient, residential, and inpatient care:  5/28/25 to present @ [place] [id number]  5/27/25 to 5/28/25 @ [facility]  5/13/25 to 5/16/25 @[facility]         Substance Use History  None noted      Patient's current relationship status is   .   Patient reported having zero child(josias).       Family Description (Constellation, significant information and events, Family Psychiatric History):     Pt reports she was born in [place] and raised in [place] as that is where her dad wanted to be.      Significant Medical issues, Life events or Trauma history:             Living  Situation:  Patient's current living/housing situation is staying in own home/apartment. They live with  and they report that housing is stable and they are not able to return upon discharge.       Educational Background:    Patient's highest education level was unknown. Patient reports they are  able to understand written materials.     Occupational and Financial Status:     Patient is currently disabled.  Patient reports  income is obtained through SSDI disability.  Patient does not identify finances as a current stressor. They are insured under [facility] Medicare Advantage and medical assistance. Restrictions (No/Yes): No - you can't restrict someone mo medicare    Occupational History: unable to gather this information    Legal Concerns (current or past history):       Current Concerns: None noted    Past History: None known      Legal Status:  Voluntary         Commitment History: None       Service History: None    Ethnic/Cultural/Spiritual considerations:   The patient describes their cultural background as White/, heterosexual, female.  Contextual influences on patient's health include mobility, severity of symptoms, and level of functioning.   Patient identified their preferred language to be English. Patient reported they do not need the assistance of an .  Spiritual considerations include: unknown    Social Functioning (organizations, interests, support system):   In their free time, patient reports they like to unable to gather this information.            Current Treatment Providers are:  Primary Care Provider:  Name/Clinic: [facility]  Number:     Medication Management/Psychiatry:  Name/Clinic: Quirino Galan   Number:     Therapist:   Name/Clinic:   Number:         Other contact information (family, friends, SO) and TRAVIS status:       :VALENTIN WEST     [phone]         GOALS FOR HOSPITALIZATION:  What do patient want to  accomplish during this hospitalization to make things better for the patient.?   Patient priorities:      The patient was not able to participate in the interview.    Social Service Assessment/Plan:  Patient view:     The patient was not able to participate in the interview.      Strengths and Assets:  The patient uses these coping skills to help with stress and hard times: unknown.          Patient will have psychiatric assessment and medication management by the psychiatrist. Medications will be reviewed and adjusted per DO/MD/APRN CNP as indicated. The treatment team will continue to assess and stabilize the patient's mental health symptoms with the use of medications and therapeutic programming. Hospital staff will provide a safe environment and a therapeutic milieu. Staff will continue to assess patient as needed. Patient will participate in unit groups and activities. Patient will receive individual and group support on the unit.      CTC will do individual inpatient treatment planning and after care planning. CTC will discuss options for increasing community supports with the patient. CTC will coordinate with outpatient providers and will place referrals to ensure appropriate follow up care is in place.
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
## 2025-05-29 NOTE — CONSULTS
"[facility]  Consult Note - Hospitalist Service  Date of Admission:  5/28/2025  Consult Requested by: Benjamin Patterson MD   Reason for Consult: \"multiple somatic complaints, possible, UTI\"    Assessment & Plan   Valentina Olmedo is a 65 year old female admitted on 5/28/2025. She has a pertinent medical history of asthma, anxiety, arthritis, borderline personality disorder, depression, schizoaffective, tobacco use disorder, HLD, HTN, thyroid cancer (S/P R hemithyroidectomy 1994) c/b iatrogenic hypothyroidism, primary hyperparathyroidism, PTSD, PE, seizure disorder, CHRIS (on CPAP), and vertigo. She initially presented to her PCP clinic on 5/27/25 for evaluation of \"non-stop crying\" for the 4-5 days preceding this visit. Ultimately, it was felt that she was best served under the care of  Psychiatry, so she presented to the [facility] ED and was ultimately transferred to station 30N on 5/28/25. Medicine was consulted for medical comanagement.     Anxiety, worsening  Depression, worsening  Passive Suicidal Ideation  Borderline Personality Disorder  Schizoaffective Disorder  Recent admission to [facility] from 5/13-5/16/25 for decompensated borderline personality disorder, PTSD, and SCAD vs BPAD. Now, admitted here after being seen in PCP clinic for \"nonstop crying\", referred to ED, and admitted to station 30N for further monitoring and management. See Psychiatry note for further details. Of note, has been on a very lengthy taper of Prednisone which certainly could be contributing to her mental health decompensation (prescribed originally for suspected asthma exacerbation, but has been on a taper for a bit now [chart indicates this was for \"obstructive lung disease\" but she does not carry the diagnosis of COPD based on PFTs as below]).  - Mgmt per Psychiatry     UTI  Based on UA that was obtained while in the ED; cloudy appearance, positive nitrites, " large LE, 67 WBCs. Ucx grew >100k CFU of gram negative bacilli. Was supposed to have started on Macrobid, but appears this never got reordered when transferred to station 30N.  - Today, start Macrobid 100mg Q12H for 5 days   - Continue to monitor clinical status    Hx of Nephrolithiasis  Noted in chart and UA showed moderate amount of calcium oxalate. However, on exam no CVA tenderness to suggest active kidney stone. Her back pain is mostly midline and c/w prior hx of spinal issues as below.  - Continue to monitor     Hx of DVT  Hx of PE (2017)  History of DVTs in the past, and per chart review had a subsegmental acute PE back in 2017 for which she was started on Warfarin and has been on ever since. INR goal 2-3. INR on 5/28 was 2.81.  - PharmD to manage Warfarin  - Given her reports of arthritic pain, would advise for falls precautions   - Strictly no NSAIDs while on Warfarin    Multilevel Cervical Stenosis c/b BUE Radiculopathy  Has followed w/ Neurosurgery in the past for this, most recently 10/2023. Stenosis classified as moderate to severe. Surgical management was discussed, but given her BMI, this was not recommended d/t higher risk of complications. She was recommended conservative management of pain including steroidal epidural injections when able, topical agents, Tylenol.  - Conservative mgmt of pain here  - Strictly avoid NSAIDs (Ibuprofen) while on Warfarin as above  - Tylenol and topicals PRN for pain  - Continue PTA Lyrica, Cymbalta    Nonintractable Epilepsy without Status Epilepticus  PTA Brivaracetam 150mg BID, Carbamazepine 200mg qAM/ 400mg w/ lunch/ 200mg qHS. No recent reports of seizure-like activity.  - Continue PTA Brivaracetam  - Continue Carbamazepine as above  - Seizure precautions    Class III Obesity  BMI here 54.18. Had been on Wegovy in the past but no longer on this.   - Mgmt per PCP    Mild Persistent Asthma  Hx of Tobacco Use Disorder  Significant tobacco use history, has a 45 pack  year history, quit in February 2023. Mild persistent asthma based on PFTs from 2022, but no e/o COPD (which is documented throughout her chart). Last saw Pulmonology 08/2024. At that time, recommended to continue ICS/LABA therapy daily along w/ Albuterol PRN. Remains on a prolonged taper of Prednisone as below based on suspected airway exacerbation in April 2025. Additionally, this is all confounded by the fact her BMI is 54, which I suspect is adding a degree of obesity hypoventilation to her breathing issues (along w/ CHRIS as below).  - Continue PTA Symbicort here  - Continue Albuterol PRN  - Follow-up w Pulmonology as directed  - Continue Prednisone taper    - 20mg daily x3 days (through 5/30)   - then transition to 10mg daily for x3 days (5/31-6/2)   - then transition to 5mg daily x4 days (6/3-6/6/25) then stop    Hx of Thyroid Cancer (S/P R Hemithyroidectomy 1994) c/b Iatrogenic Hypothyroidism  PTA Levothyroxine 300mcg daily. Preadmission workup showing TSH 0.01, but free T4 technically WNL (1.61), though borderline high. Since levothyroxine is a medication dosed based on weight, she is taking a higher dose which, based on borderline high free T4.   - Given technically WNL Free T4, would hold off on making any medication adjustments now  - Recheck TFTs in 3-4 weeks, and if stable, continue current dosing, but if free T4 is above normal limits, then would drop dose (may do this w/ PCP if discharges before then)    HLD  PTA Statin.  - Continue PTA Statin     HTN  Listed in chart, but not on any antihypertensive therapy recently. Normotensive here.  - Continue to monitor BP w/ VS as ordered  - Notify Medicine if one-time reading >180/110  - Notify Medicine if, once closer to discharge, BP is consistently >140/90 and can discuss risks vs benefits of introducing antihypertensive therapy (if indicated)    GERD  PTA PPI.  - Continue PTA PPI    Allergic Rhinitis  PTA Flonase.  - Continue PTA Flonase     CHRIS  - Continue  "CPAP w/ home settings here      The patient's care was discussed with the Bedside Nurse and Patient.    Medicine will sign off. No further recommendations at this time.  Please feel free to reconsult if any new medical issues or concerns.  Thank you for the opportunity to care for this patient.     Clinically Significant Risk Factors Present on Admission                # Drug Induced Coagulation Defect: home medication list includes an anticoagulant medication    # Hypertension: Noted on problem list           # Morbid Obesity: Estimated body mass index is 54.18 kg/m  as calculated from the following:    Height as of 5/27/25: 1.676 m (5' 6\").    Weight as of this encounter: 152.3 kg (335 lb 11.2 oz).              Jim Beasley PA-C  Hospitalist Service  Securely message with [employer] (more info)  Text page via [facility] Paging/Directory   ______________________________________________________________________    Chief Complaint   \"I can't stop crying\"    History is obtained from the patient. History difficult to obtain due to frequent outbursts of crying.    History of Present Illness   Valentina Olmedo is a 65 year old female who is seen in her room this morning.  She reports a sore throat from \"crying so much\", and also feels that she has \"some\" sinus pain and rhinorrhea, correlating with her crying spells.  She otherwise denies any acutely new symptoms, but does report ongoing chest pain with shortness of breath (chest pain is worse when she presses on her chest).  Also notes nausea with eating, but denies vomiting.  Reports dizziness, but no loss of consciousness or seizures.  Reports \"some\" constipation, tried to have a bowel movement yesterday, but was unable to, and cannot recall when her most recent bowel movement was.  She does admit to eating \"very\" poorly however.  Unsure if she has new lower extremity swelling.  Does have \"some\" dysuria, which has been present for the past few days.  No hematuria.    Past " Medical History    Past Medical History:   Diagnosis Date    Adrenal mass 10/12/2015    Chen Null MD  They were incidentally discovered on the CAT scan of the abdomen from December 2011 which was done for evaluation of kidney stones. F/up CT of the abdomen done on 6/26/12 showed a stable 5 mm nodular opacity in the right lower lung lobe, adjacent to the diagram. Bilateral adrenal masses average density measured less than 0 Hounsfield units, consistent with benign etio    Age-related cataract of both eyes, unspecified age-related cataract type 03/27/2024    Anxiety     Anxiety     Arthritis     Borderline personality disorder (H)     Cancer (H)     COPD (chronic obstructive pulmonary disease) (H)     Depression     Depressive disorder     History of COVID-19 4/19/2021    Hyperlipidemia     Hypertension     Hypertension     Hyponatremia     Hypothyroidism     Malignant neoplasm of thyroid gland (H)     Chen Null MD  She had R hemithyroidectomy for a right neck tumor in 1994. According to the patient, the tumor was benign. She is not sure whether or not the tumor belonged to the thyroid gland. The surgery was done here at the [facility]. In January 2011, she was diagnosed with kidney stones. She was found to have an elevated calcium level of 11.7, with a PTH level of 368. Stone an    Primary hyperparathyroidism           PTSD (post-traumatic stress disorder)     Pulmonary embolism with infarction (H) 01/12/2021    Recurrent otitis media     Seizure disorder (H)     Sleep apnea     cpap at [facility]    Stroke (H)     Tobacco abuse 9/29/2015    Vertigo        Past Surgical History   Past Surgical History:   Procedure Laterality Date    ABDOMEN SURGERY      ADRENALECTOMY Left     BRAIN SURGERY      COLONOSCOPY N/A 5/7/2024    Procedure: WITH COLONOSCOPY with polypectomies;  Surgeon: Walt Lira MD;  Location: [facility] Main OR    CRANIOTOMY FOR TEMPORAL LOBECTOMY Right     x3 for seizure     DILATION AND CURETTAGE      ESOPHAGOSCOPY, GASTROSCOPY, DUODENOSCOPY (EGD), COMBINED N/A 5/7/2024    Procedure: and biopsies;  Surgeon: Walt Lira MD;  Location: [facility]    ESOPHAGOSCOPY, GASTROSCOPY, DUODENOSCOPY (EGD), DILATATION, COMBINED N/A 5/7/2024    Procedure: ESOPHAGOGASTRODUODENOSCOPY with esophageal dilation;  Surgeon: Walt Lira MD;  Location: [facility] OR    HEAD & NECK SURGERY      HYSTERECTOMY, PAP NO LONGER INDICATED N/A     LITHOTRIPSY      PARATHYROID GLAND SURGERY      resection of adenoma    REFRACTIVE SURGERY Bilateral     RELEASE CARPAL TUNNEL Bilateral     TONSILLECTOMY & ADENOIDECTOMY      TOTAL THYROIDECTOMY      TOTAL VAGINAL HYSTERECTOMY      39 years old. Benign       Medications   I have reviewed this patient's current medications       Review of Systems    The 10 point Review of Systems is negative other than noted in the HPI or here.     Social History   I have reviewed this patient's social history and updated it with pertinent information if needed.  Social History     Tobacco Use    Smoking status: Former     Types: Cigarettes     Passive exposure: Never    Smokeless tobacco: Never    Tobacco comments:     Quit about 5 days ago per pt   Vaping Use    Vaping status: Never Used   Substance Use Topics    Alcohol use: No     Comment: None.    Drug use: No        Physical Exam   Vital Signs: Temp: 97.2  F (36.2  C) Temp src: Temporal BP: 130/84 Pulse: 86   Resp: 18 SpO2: 94 % O2 Device: None (Room air)    Weight: 335 lbs 11.2 oz    Constitutional: awake, alert, intermittently cooperative between bouts of crying and appears stated age  Eyes: lids and lashes normal, sclera clear, and conjunctiva normal  ENT: normocephalic, without obvious abnormality  Respiratory: No increased work of breathing, good air exchange, clear to auscultation bilaterally, no crackles or wheezing  Cardiovascular: regular rate and rhythm, normal S1 and S2, no S3, no S4, and no murmur  noted  GI: normal bowel sounds, soft, non-distended, and non-tender  Skin: no bruising or bleeding, no redness, warmth, or swelling, no rashes, and no lesions on visualized skin.   Musculoskeletal: no lower extremity pitting edema present, no deformities. Bilateral calf tenderness, but no palpable cord.  Neurologic: Moving all extremities equally and spontaneously. No obvious focal neuro deficits.  Neuropsychiatric: General: depressed-appearing, but normal eye contact  Level of consciousness: alert / normal  Affect: anxious and tearful    Medical Decision Making       65 MINUTES SPENT BY ME on the date of service doing chart review, history, exam, documentation & further activities per the note.      Data     I have personally reviewed the following data over the past 24 hrs:    TSH: N/A T4: N/A A1C: 5.5     INR:  2.65 (H) PTT:  N/A   D-dimer:  N/A Fibrinogen:  N/A       Imaging results reviewed over the past 24 hrs:   No results found for this or any previous visit (from the past 24 hours).  Recent Labs   Lab 05/29/25  0750 05/28/25  0712 05/27/25  1932 05/27/25  1450   WBC  --   --   --  5.9   HGB  --   --   --  14.5   MCV  --   --   --  83   PLT  --   --   --  237   INR 2.65* 2.81* 2.96*  --    NA  --   --   --  140   POTASSIUM  --   --   --  3.7   CHLORIDE  --   --   --  103   CO2  --   --   --  24   BUN  --   --   --  8.8   CR  --   --   --  0.73   ANIONGAP  --   --   --  13   HILARY  --   --   --  9.3   GLC  --   --   --  116*

## 2025-05-29 NOTE — TELEPHONE ENCOUNTER
Left message to call back for: home care  Information to relay to patient: please connect with home care staff, it looks like patient is currently admitted to the hospital.

## 2025-05-29 NOTE — TELEPHONE ENCOUNTER
Home Health calling to notify they are doing recert today. Need med list faxed to [phone].    Also notifies pt INR is rarely in range and varies significantly. Reports pt is poor historian with diet, symptoms, etc. Wondering if PCP would want to switch her from Warfarin to Eliquis or Xarelto?
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
## 2025-05-29 NOTE — PLAN OF CARE
05/28/25 1859   Patient Belongings   Did you bring any home meds/supplements to the hospital?  No   Patient Belongings locker   Patient Belongings Put in Hospital Secure Location (Security or Locker, etc.) shoes;cell phone/electronics   Belongings Search Yes   Clothing Search Yes   Second Staff Joselyn RN     One Cell phone with one    One pants with Top   One underwear  One shoe    ..A               Admission:  I am responsible for any personal items that are not sent to the safe or pharmacy.  [facility] is not responsible for loss, theft or damage of any property in my possession.    Signature:  _________________________________ Date: _______  Time: _____                                              Staff Signature:  ____________________________ Date: ________  Time: _____      2nd Staff person, if patient is unable/unwilling to sign:    Signature: ________________________________ Date: ________  Time: _____     Discharge:  [facility] has returned all of my personal belongings:    Signature: _________________________________ Date: ________  Time: _____                                          Staff Signature:  ____________________________ Date: ________  Time: _____

## 2025-05-29 NOTE — PLAN OF CARE
[facility] IP Unit Acuity Rating Score (UARS)  Patient is given one point for every criteria they meet.    CRITERIA SCORING   On a 72 hour hold, court hold, committed, stay of commitment, or revocation. 0    Patient LOS on [facility] unit exceeds 20 days. 0  LOS: 1   Patient under guardianship, 55+, otherwise medically complex, or under age 11. 1   Suicide ideation without relief of precipitating factors. 1   Current plan for suicide. 0   Current plan for homicide. 0   Imminent risk or actual attempt to seriously harm another without relief of factors precipitating the attempt. 0   Severe dysfunction in daily living (ex: complete neglect for self care, extreme disruption in vegetative function, extreme deterioration in social interactions). 1   Recent (last 7 days) or current physical aggression in the ED or on unit. 0   Restraints or seclusion episode in past 72 hours. 0   Recent (last 7 days) or current verbal aggression, agitation, yelling, etc., while in the ED or unit. 0   Active psychosis. 0   Need for constant or near constant redirection (from leaving, from others, etc).  0   Intrusive or disruptive behaviors. 0   Patient requires 3 or more hours of individualized nursing care per 8-hour shift (i.e. for ADLs, meds, therapeutic interventions). 1   TOTAL 3

## 2025-05-29 NOTE — PHARMACY-ADMISSION MEDICATION HISTORY
Please see Admission Medication History completed on 5/27/2025 under previous encounter at [facility] for information regarding prior to admission medications.     Lashawn BricenoD

## 2025-05-29 NOTE — TELEPHONE ENCOUNTER
Incoming call from RAJEEV Montes and relayed patient appears to be admitted. She verbalized understanding and will follow her IP stay.

## 2025-05-29 NOTE — PLAN OF CARE
Problem: Adult Behavioral Health Plan of Care  Goal: Adheres to Safety Considerations for Self and Others  Outcome: Progressing  Flowsheets (Taken 5/29/2025 2332)  Adheres to Safety Considerations for Self and Others: making progress toward outcome     Problem: Suicide Risk  Goal: Absence of Self-Harm  Outcome: Progressing     Shift overview: This morning, pt overheard crying in her room and a psych associate went to check in on pt. Pt requested her medications but declined to come onto unit for VS and breakfast. Writer brought pt's medications and breakfast ot her room. Pt ate 25% and reports that breakfast was not what she had chosen, despite filling out a menu. Pt encouraged to notify staff if this happens again. Writer unable to get pt a new/different breakfast tray due to the time pt woke up and allowed staff to talk with her/bring her breakfast. Writer met with pt frequently this shift as pt had several requests and concerns. Pt visible on unit occasionally with encouragement from writer and staff. Pt visible on unit attending lunch and filling out her menu in dining area. Pt keeps to herself when visible on the unit and does not engage with peers. Pt ate 100% of lunch. Pt otherwise spent majority of the day in her room meeting with treatment team and resting in bed. Writer assisted pt with changing linen and obtaining unit hygiene supplies for pt - pt appreciative of this. Pt also provided with various items to see what may or may not work for her with her anxiety, stress, and agitation. Pt given different fidgets, stress ball, essential oils, etc. Pt tried a weighted neck shall; however, she returned it and disliked that it was not soft. Writer also informed pt of various activities she could try, including, listening to music, coloring, reading, playing a game, or attending a group. Pt declined these activities this shift; however, writer did encourage pt to try tomorrow. Pt acknowledged an understanding  of this. Pt reports she would like to try to shower tomorrow with a female staff assisting and if not tomorrow, she would like to this weekend. Writer met with provider and pt together -  see provider note for further details. Pt seen by IM this shift - see IM note for further details. Nutrition consult placed per pt request. Writer able to complete pt's admission profile this shift; although, it did take the entire shift as pt needed breaks often. Pt provided with notice of voluntary legal status form and pt denies any questions regarding this. Pt received admission folder and writer did remind pt of visiting hours, phone number, and instructions for visitors as pt reported she was not sure whether her  knew all of the information.  Pt reports she would call him this evening to ensure he was familiar. Pt observed to have less requests and concerns this afternoon and did not require as much 1:1 time from writer. Pt encouraged to try to spend more time out of her room and to notify staff if she needs assistance. Pt acknowledged an understanding of this.     Medications: Pt is medication compliant. She denies any medication SE at this time. Pt started on scheduled macrobid this shift - see eMAR for further details. 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 Pt has significant medical hx - see pt's chart for further details. Pt has UTI and was started today on macrobid - see eMAR or IM note for further details. Pt had INR drawn today and coumadin ordered for 1800. Pt will have carbamazepine and INR drawn tomorrow morning. Pt uses wheelchair and is able to transfer in/out of bed and into wheelchair and in/out of wheelchair to toilet.  "Pt reports she requires assistance with various activities, including dressing and showering. Pt reports she has not been using CPAP recently due to a cold and that she will let staff know when she is ready to use CPAP. Pt reports she will need to use a hospital CPAP if her  does not drop hers off by the time she feels ready/when her cold is gone. Pt will need RT consult (from provider or on-call provider); however, will not be ordered until pt informs staff that she is ready to use CPAP. Per provider, pt WILL require an SIO when using CPAP (obtain from provider or on-call provider when getting RT consult). Pt reports she will inform staffing when she is ready. Pt c/o 7/10 throat, back, and bilateral knee pain this shift - scheduled medication given. Pt reports pain decreased and declines further pharmacological and nonpharmacological interventions (apart from resting) at this time. Pt reported her throat only hurt due to crying. Pt informed she has lozenges available now for future, if needed. An order obtained for delta foam/soft care/egg crate mattress; however, writer did not wake pt to bring to her. Writer informed oncoming shift to assist pt with this for her bed when she is awake. Pt c/o nausea this shift - PRN zofran given per pt request. Pt reports PRN zofran somewhat helpful. Writer did add to pt's pending nutrition consult that pt would like ginger ale sent with meals and in-between to help with nausea. Pt declined sea bands, peppermint essential oil, and ginger chews at this time. No other acute medical concerns at this time - see IM note for further details.     Mental Health: Pt endorses high anxiety and high depression - scheduled medication given, along with PRN zyprexa to assist with anxiety and agitation. PRN zyprexa helpful and pt observed to be less labile, tearful, and restless afterward. Pt endorses SI/SIB with no plan/method/intent. Pt informed writer and provider she \"sometimes\" thinks " "about making a plan but that she never has. Per unit therapist, pt also reported that she thinks about medical assisted suicide. Writer checked in with pt after she met with unit therapist and pt again reports thoughts of SI/SIB; however, she declines any plan/method/intent. Pt reports she feels \"scared\" on the unit due to it being a new environment for her with unfamiliar faces and due to being away from her ; however, she reports she contracts for safety and feels as though she will be able to keep herself safe while here. Pt reports she does not think she would be able to keep herself safe if she were to be discharged and voiced numerous times today that she was worried she would be discharged before she felt ready. Writer encouraged pt to focus on today and that provider will be assessing her regularly to determine when he feels pt is ready to discharge. Pt acknowledged an understanding of this. Pt appears unkempt; however, is dressed appropriately. Pt's eye contact observed to be intermittent. Pt's speech observed to be loud when she is visibly upset and tearful/crying loudly. Pt's speech observed to be incoherent at times when pt is visibly upset and tearful/crying loudly. Pt's speech otherwise observed to be clear and coherent. Pt observed to be hopeless, pessimistic, and sad. Pt presents with a tearful and labile affect. Pt required redirection often as pt perseverative on worrying about discharging too soon and other various concerns/requests. Writer had to provide frequent redirection and reassurance to pt. Pt observed to be restless, especially this morning. Pt observed to be calmer this afternoon and observed to be less less labile, anxious, restless, and tearful this afternoon. Thought content observed to be helplessness, hopelessness, somatic thoughts, and suicidal thoughts. Thought process observed to be disorganized. Pt's concentration observed to be impaired. Pt observed to be isolative and " withdrawn. Pt has a no roommate order. Pt denies any additional questions or concerns at this time. She remains on suicide, seizure, and fall precautions. No falls or seizure activity noted this shift. Will continue to monitor and assist as needed.     Recommendations to oncoming staff:  Please provide pt with delta foam/soft care/egg crate mattress when pt is awake.   Pt has INR and carbamazepine lab draw tomorrow morning - please call lab if they are not up prior to end of NOC shift as pt has carbamazepine medication scheduled for 0800. Pt will have INR lab daily.   If pt's coumadin is not scheduled by 1800, nursing to reach out to pharmacist. This applies to each day.    Prior to discharge, pt needs teaching for warfarin (coumadin) - see Adult Education Outcome Record Flowsheet/ or document in electronic record (per order).  Assist pt with a shower when female staff available. Pt will require assistance and a shower chair. Also, day shift, please discuss with treatment team whether pt can utilize hygiene wipes. Pt may benefit from using these and shower cap or no rinse shampoo as pt unable to shower without assistance and we may not always have female staff available to assist her.   Please encourage pt to change positions throughout the day and encourage pt to wheel herself in wheelchair as this is what pt does at home. Assist pt when needed for her safety; however, encourage pt to do things she is able to do herself/things she does herself at home.   Continue to support pt's plan of care.

## 2025-05-29 NOTE — PLAN OF CARE
Problem: Sleep Disturbance  Goal: Adequate Sleep/Rest  Outcome: Progressing   Goal Outcome Evaluation:    Pt asleep at start of shift. 15 minutes safety checks done throughout the night. Slept for a total of 6 hours.

## 2025-05-29 NOTE — PLAN OF CARE
05/29/25 1400   General Information   Has Not Attended OT as of: 05/29/25     Pt has not attended scheduled occupational therapy sessions. Encourage attendance and participation.

## 2025-05-29 NOTE — H&P
"[facility], [place]   Psychiatric History and Physical.    Chief complaint and reason for admission:     Depression with passive Suicidal ideation and anxiety.    History of present illness: per DEC assessment:  Valentina Olmedo is a 65 year old female admitted on 5/28/2025. She has a pertinent medical history of asthma, anxiety, arthritis, borderline personality disorder, depression, schizoaffective, tobacco use disorder, HLD, HTN, thyroid cancer (S/P R hemithyroidectomy 1994) c/b iatrogenic hypothyroidism, primary hyperparathyroidism, PTSD, PE, seizure disorder, CHRIS (on CPAP), and vertigo. She initially presented to her PCP clinic on 5/27/25 for evaluation of \"non-stop crying\" for the 4-5 days preceding this visit. Ultimately, it was felt that she was best served under the care of  Psychiatry, so she presented to the [facility] ED and was ultimately transferred to station 30.   She presents with complaints of Suicidal ideation, Depression, Health stressors. Factors that make the mental health crisis life threatening or complex are: Pt sent to ED from  PCP after endorsing SI. Pt denies a plan but is sad and tearful in triage stating, \"I need help\". Upon DEC assessment, pt alert and oriented but is highly emotionally labile and is a poor history/has limited insight. Pt initially had anxious affect, and reports since being discharged from [facility] earlier this month she has had  crying spells .  When asked what she believes is causing this spells, pt yelled  I don t know  then began to sob. She then stopped after about a minute and appeared anxious again. This repeated several times during the 20 minute assessment. Pt reports that when she was discharged recently, she was not stabilized and they let her go too soon. She reports she had asked to discharge and said she felt ready, because she felt the nurses were rude to her. Pt endorses decrease in appetite, wanting to sleep more, and " "hopelessness, in addition to her crying spells. When asked about SI pt denied a plan, and then yelled  I don t want to live but I don t want to die  and then covered her face and began sobbing. Pt endorses anxious symptoms of shortness of breath, chest tightness, and racing thoughts. She denies AH but endorses occasionally seeing VH in the form of  blobs . Pt denies any SIB,HI, or substance use.    IM consult note Jim Beasley PA-C 5/29/2025  8:09 AM:  She reports a sore throat from \"crying so much\", and also feels that she has \"some\" sinus pain and rhinorrhea, correlating with her crying spells. She otherwise denies any acutely new symptoms, but does report ongoing chest pain with shortness of breath (chest pain is worse when she presses on her chest). Also notes nausea with eating, but denies vomiting.  Reports dizziness, but no loss of consciousness or seizures.  Reports \"some\" constipation, tried to have a bowel movement yesterday, but was unable to, and cannot recall when her most recent bowel movement was.  She does admit to eating \"very\" poorly however.  Unsure if she has new lower extremity swelling.  Does have \"some\" dysuria, which has been present for the past few days.  No hematuria.     She states she would never do anything to harm herself. No SI or HI. Intermittently tearful during our conversation. No medical complaints.     During visit with this provider:   Valentina Olmedo is a 65 year old female who we met in her room this morning. She first stated with her voice breaking \"Why did you even ask me that.\" And later replied, while sobbing, that she is here because she is anxious and depressed. She cannot tell why specifically she is depressed.     Past psychiatric history: Hospitalized on 5/13/25 at [facility]note 3] for Anxiety ([facility]) anxiety/depression failing outpatient treatment. She was referred there from her outpatient psychiatrist who has recommended admission due to being unable to control " "patient's symptoms as an outpatient. She has been seen here ([facility]) a few times in the past month for anxiety and depression. They recently made changes to her medications including increasing her Abilify. They are recommending admission for drug washout and to get stabilized on her Abilify. The patient reports feeling anxious and depressed and does not know why. She reports a remote suicide attempt in 1985 but nothing since then. Denies CD hx.  Anxiety   PTSD  Depressive disorder   Borderline personality DO    Past medical history:  Vertigo  Stroke  Sleep apnea  Seizure disorder  PE  HTN  COPD  Cataract  Adrenal mass  Hyponatremia  Hyperlipidemia   Thyroid malignant neoplasm     Past Surgical History:   Procedure Laterality Date    ABDOMEN SURGERY        ADRENALECTOMY Left      BRAIN SURGERY        COLONOSCOPY N/A 5/7/2024     Procedure: WITH COLONOSCOPY with polypectomies;  Surgeon: Walt Lira MD;  Location: [facility] OR    CRANIOTOMY FOR TEMPORAL LOBECTOMY Right       x3 for seizure    DILATION AND CURETTAGE        ESOPHAGOSCOPY, GASTROSCOPY, DUODENOSCOPY (EGD), COMBINED N/A 5/7/2024     Procedure: and biopsies;  Surgeon: Walt Lira MD;  Location: [facility] OR    ESOPHAGOSCOPY, GASTROSCOPY, DUODENOSCOPY (EGD), DILATATION, COMBINED N/A 5/7/2024     Procedure: ESOPHAGOGASTRODUODENOSCOPY with esophageal dilation;  Surgeon: Walt Lira MD;  Location: [facility] OR    HEAD & NECK SURGERY        HYSTERECTOMY, PAP NO LONGER INDICATED N/A      LITHOTRIPSY        PARATHYROID GLAND SURGERY         resection of adenoma    REFRACTIVE SURGERY Bilateral      RELEASE CARPAL TUNNEL Bilateral      TONSILLECTOMY & ADENOIDECTOMY        TOTAL THYROIDECTOMY        TOTAL VAGINAL HYSTERECTOMY         39 years old. Benign       Family and social history:  Lives with her  who takes care of the house and the patient herself. She stated that he is a \"good ramesh\" but does not know how to help her " medically.   Her child  (no further details shared). No live children. Patient is disabled because of combination of MI and medical illnesses.   Has a hx of alcoholism and depression in sister.         Medications:     Current Facility-Administered Medications   Medication Dose Route Frequency Provider Last Rate Last Admin    ARIPiprazole (ABILIFY) half-tab 15 mg  15 mg Oral QAM Benjamin Patterson MD   15 mg at 25 0916    Brivaracetam (BRIVIACT) tablet 150 mg  150 mg Oral BID Benjamin Patterson MD   150 mg at 25 0916    budesonide-formoterol (SYMBICORT/BREYNA) 160-4.5 MCG/ACT inhaler 2 puff  2 puff Inhalation BID Benjamin Patterson MD   2 puff at 25 0917    carBAMazepine (TEGretol XR) 12 hr tablet 400 mg  400 mg Oral Daily with lunch Benjamin Patterson MD        carBAMazepine (TEGretol) tablet 200 mg  200 mg Oral BID Benjamin Patterson MD   200 mg at 25 0919    cyanocobalamin (VITAMIN B-12) tablet 1,000 mcg  1,000 mcg Oral Daily Benjamin Patterson MD   1,000 mcg at 25 0916    DULoxetine (CYMBALTA) DR capsule 120 mg  120 mg Oral Daily Benjamin Patterson MD   120 mg at 25 0916    fluticasone (FLONASE) 50 MCG/ACT spray 1 spray  1 spray Both Nostrils BID Benjamin Patterson MD   1 spray at 25 0916    levothyroxine (SYNTHROID/LEVOTHROID) tablet 300 mcg  300 mcg Oral QAM AC Benjamin Patterson MD   300 mcg at 25 0918    nitroFURantoin macrocrystal-monohydrate (MACROBID) capsule 100 mg  100 mg Oral Q12H [facility] () Jim Beasley PA-C        pantoprazole (PROTONIX) EC tablet 40 mg  40 mg Oral QAM AC Benjamin Patterson MD   40 mg at 25 0916    prazosin (MINIPRESS) capsule 2 mg  2 mg Oral At Bedtime Benjamin Patterson MD   2 mg at 25    predniSONE (DELTASONE) tablet 20 mg  20 mg Oral Daily Benjamin Patterson MD   20 mg at 25 0916    Followed by    [START ON 2025] predniSONE (DELTASONE) tablet 10  mg  10 mg Oral Daily Benjamin Patterson MD        Followed by    [START ON 6/3/2025] predniSONE (DELTASONE) tablet 5 mg  5 mg Oral Daily Benjamin Patterson MD        pregabalin (LYRICA) capsule 25 mg  25 mg Oral BID Benjamin Patterson MD   25 mg at 05/29/25 0916    QUEtiapine (SEROquel) half-tab 150 mg  150 mg Oral At Bedtime Benjamin Patterson MD   150 mg at 05/28/25 2100    rosuvastatin (CRESTOR) tablet 20 mg  20 mg Oral Daily Benjamin Patterson MD   20 mg at 05/29/25 0918    sennosides (SENOKOT) tablet 2 tablet  2 tablet Oral BID Benjamin Patterson MD   2 tablet at 05/29/25 0919    vibegron (GEMTESA) tablet 75 mg  75 mg Oral Daily Benjamin Patterson MD        Vitamin D3 (CHOLECALCIFEROL) tablet 100 mcg  100 mcg Oral Daily Benjamin Patterson MD   100 mcg at 05/29/25 0916          Allergies:     Allergies   Allergen Reactions    Aspirin Shortness Of Breath    Bees Anaphylaxis    Lamotrigine Dizziness and Unknown    Latex Itching    Phenobarbital      aggression    Vistaril [Hydroxyzine] Other (See Comments)     Sluggish, unable to wake up    Gabapentin Rash          Labs:     Recent Results (from the past 24 hours)   Influenza A/B, RSV and SARS-CoV2 PCR (COVID-19) Nose    Collection Time: 05/28/25 10:41 AM    Specimen: Nose; Swab   Result Value Ref Range    Influenza A PCR Negative Negative    Influenza B PCR Negative Negative    RSV PCR Negative Negative    SARS CoV2 PCR Negative Negative   UA with Microscopic reflex to Culture    Collection Time: 05/28/25 10:59 AM    Specimen: Urine, Clean Catch   Result Value Ref Range    Color Urine Orange (A) Colorless, Straw, Light Yellow, Yellow    Appearance Urine Cloudy (A) Clear    Glucose Urine Negative Negative mg/dL    Bilirubin Urine Negative Negative    Ketones Urine Negative Negative mg/dL    Specific Gravity Urine 1.029 1.001 - 1.030    Blood Urine Negative Negative    pH Urine 5.5 5.0 - 7.0    Protein Albumin Urine Negative Negative  mg/dL    Urobilinogen Urine Normal Normal mg/dL    Nitrite Urine Positive (A) Negative    Leukocyte Esterase Urine 500 Rosio/uL (A) Negative    Bacteria Urine Few (A) None Seen /HPF    Mucus Urine Present (A) None Seen /LPF    Calcium Oxalate Crystals Urine Moderate (A) None Seen /HPF    RBC Urine 6 (H) <=2 /HPF    WBC Urine 67 (H) <=5 /HPF    Squamous Epithelials Urine 4 (H) <=1 /HPF   Urine Drug Screen Panel    Collection Time: 05/28/25 10:59 AM   Result Value Ref Range    Amphetamines Urine Screen Negative Screen Negative    Barbituates Urine Screen Negative Screen Negative    Benzodiazepine Urine Screen Positive (A) Screen Negative    Cannabinoids Urine Screen Negative Screen Negative    Cocaine Urine Screen Negative Screen Negative    Fentanyl Qual Urine Screen Negative Screen Negative    Opiates Urine Screen Negative Screen Negative    PCP Urine Screen Negative Screen Negative   Urine Culture    Collection Time: 05/28/25 10:59 AM    Specimen: Urine, Clean Catch   Result Value Ref Range    Culture >100,000 CFU/mL Gram negative bacilli (A)    Hemoglobin A1c    Collection Time: 05/29/25  7:50 AM   Result Value Ref Range    Estimated Average Glucose 111 <117 mg/dL    Hemoglobin A1C 5.5 <5.7 %   Lipid Profile    Collection Time: 05/29/25  7:50 AM   Result Value Ref Range    Cholesterol 201 (H) <200 mg/dL    Triglycerides 61 <150 mg/dL    Direct Measure HDL 98 >=50 mg/dL    LDL Cholesterol Calculated 91 <100 mg/dL    Non HDL Cholesterol 103 <130 mg/dL   INR    Collection Time: 05/29/25  7:50 AM   Result Value Ref Range    INR 2.65 (H) 0.85 - 1.15    PT 28.4 (H) 11.8 - 14.8 Seconds          Psychiatric Examination:     BP (!) 151/73   Pulse 78   Temp 97.5  F (36.4  C) (Oral)   Resp 18   Wt (!) 152.3 kg (335 lb 11.2 oz)   LMP  (LMP Unknown)   SpO2 96%   BMI 54.18 kg/m    Weight is 335 lbs 11.2 oz  Body mass index is 54.18 kg/m .                                             Appearance: awake, alert, dressed in  hospital scrubs, mild distress, morbidly obese, and unkempt, seen in her bed;  Attitude:  somewhat cooperative  Eye Contact:  poor   Mood:  angry, anxious, and sad   Affect:  mood incongruent, intensity is dramatic, and labile  Speech:  clear, coherent, frequently interrupted by crying and sobbing sounds, no tears were observed at any point during the meeting   Psychomotor Behavior:  high amplitude tremor observed or left arm and right leg (under blanket); per RN: twitching seems to be starting/increasing when the patient knows she is being observed by staff  Throught Process: overall, linear and goal oriented  Associations:  no loose associations  Thought Content:  no evidence of psychotic thought, no auditory hallucinations present, and no visual hallucinations present. Passive Suicidal ideation present, denies active plan to harm self.  Insight:  limited  Judgement:  limited  Oriented to:  Patient was displaying dramatic emotions and it was not assessed at this time  Attention Span and Concentration:  fair  Recent and Remote Memory:  fair       Review of systems:     For physical examination and 12 point review of system please, see note of Dr. Dodson, Isma Black DO from 5/27/2025.  I reviewed that note and agree with it.         Diagnoses:   Unspecified anxiety  Unspecified Depressive disorder with SI, rule out Major depressive disorder, recurrent, severe;  Borderline personality DO, possible, histrionic PD is suspected as well.          Plan:   Will increase Abilify dose, will recheck Carbamazepine level in am.   Will provide structure and support until the patient is stabilized. IM consults were already ordered and she was seen this morning.     Per IM Consult  Today, started Macrobid 100mg Q12H for 5 days for UTI  Hx of DVT with PE; PharmD to manage Warfarin. Strictly no NSAIDs while on Warfarin.  Tylenol and topicals PRN for pain  Continue PTA Lyrica, Cymbalta  Continue Carbamazepine for seizure  DO  Continue PTA Statin for HLD  Continue PTA PPI for GERD  Continue PTA Flonase for allergic rhinitis  Continue CPAP w/ home settings here for CHRIS       I was present with PA student who participated in the service and in the documentation of the note. I have verified the history and personally performed the physical exam and medical decision making. I agree with the assessment and plan of care as documented in the note.     Benjamin Patterson MD  [facility]     Total time spent was 58 minutes. Over 50% of times was spent counseling and coordination of care regarding coping skills, medication and discharge planning.

## 2025-05-29 NOTE — PLAN OF CARE
Patient arrived  5/28/2025  6:31 PM from [facility].     [facility] Admitting  DX: Schizoaffective disorder, unspecified [F25.9]    Vital signs reviewed related to admission.  /84 (BP Location: Left arm)   Pulse 86   Temp 97.2  F (36.2  C) (Temporal)   Resp 18   Wt (!) 152.3 kg (335 lb 11.2 oz)   LMP  (LMP Unknown)   SpO2 94%   BMI 54.18 kg/m  .    Patient arrived on the unit and was cooperative with the clothing search. Declined the interview and wanted to rest. Patient has difficulty getting off the gurney when transported. States that she has arthritis in her knees and is in pain. Requested to use a wheelchair. Gait is slow and has difficulty walking.    States that the reason for her admission is increased anxiety. Patient was very teary and labile. Stated that her anxiety was high. Offered her prn ativan. Later stated that it had helped and was now 3/10. Reports being depressed. States that she occasionally has suicidal thoughts with no plan. Denies auditory and visual hallucinations today. Med compliant. Ate dinner. Patient is voluntary.

## 2025-05-30 LAB
CARBAMAZEPINE SERPL-MCNC: 6.9 UG/ML (ref 4–12)
INR PPP: 2.48 (ref 0.85–1.15)
PROTHROMBIN TIME: 27.1 SECONDS (ref 11.8–14.8)

## 2025-05-30 PROCEDURE — 90853 GROUP PSYCHOTHERAPY: CPT

## 2025-05-30 PROCEDURE — 250N000012 HC RX MED GY IP 250 OP 636 PS 637: Performed by: PSYCHIATRY & NEUROLOGY

## 2025-05-30 PROCEDURE — 97150 GROUP THERAPEUTIC PROCEDURES: CPT | Mod: GO

## 2025-05-30 PROCEDURE — 250N000013 HC RX MED GY IP 250 OP 250 PS 637

## 2025-05-30 PROCEDURE — 80156 ASSAY CARBAMAZEPINE TOTAL: CPT | Performed by: PSYCHIATRY & NEUROLOGY

## 2025-05-30 PROCEDURE — 250N000013 HC RX MED GY IP 250 OP 250 PS 637: Performed by: PSYCHIATRY & NEUROLOGY

## 2025-05-30 PROCEDURE — 36415 COLL VENOUS BLD VENIPUNCTURE: CPT

## 2025-05-30 PROCEDURE — 85610 PROTHROMBIN TIME: CPT

## 2025-05-30 PROCEDURE — 124N000002 HC R&B MH UMMC

## 2025-05-30 PROCEDURE — 99233 SBSQ HOSP IP/OBS HIGH 50: CPT | Performed by: PSYCHIATRY & NEUROLOGY

## 2025-05-30 RX ORDER — WARFARIN SODIUM 7.5 MG/1
7.5 TABLET ORAL
Status: COMPLETED | OUTPATIENT
Start: 2025-05-30 | End: 2025-05-30

## 2025-05-30 RX ORDER — ARIPIPRAZOLE 10 MG/1
20 TABLET ORAL EVERY MORNING
Status: DISPENSED | OUTPATIENT
Start: 2025-05-31

## 2025-05-30 RX ADMIN — BRIVARACETAM 150 MG: 50 TABLET, FILM COATED ORAL at 20:30

## 2025-05-30 RX ADMIN — FLUTICASONE PROPIONATE 1 SPRAY: 50 SPRAY, METERED NASAL at 20:31

## 2025-05-30 RX ADMIN — PREGABALIN 25 MG: 25 CAPSULE ORAL at 20:30

## 2025-05-30 RX ADMIN — PREDNISONE 20 MG: 5 TABLET ORAL at 08:09

## 2025-05-30 RX ADMIN — PRAZOSIN HYDROCHLORIDE 2 MG: 2 CAPSULE ORAL at 20:35

## 2025-05-30 RX ADMIN — LEVOTHYROXINE SODIUM 300 MCG: 0.15 TABLET ORAL at 08:10

## 2025-05-30 RX ADMIN — NITROFURANTOIN MONOHYDRATE/MACROCRYSTALS 100 MG: 75; 25 CAPSULE ORAL at 08:10

## 2025-05-30 RX ADMIN — SENNOSIDES 2 TABLET: 8.6 TABLET ORAL at 20:31

## 2025-05-30 RX ADMIN — Medication 150 MG: at 20:33

## 2025-05-30 RX ADMIN — PREGABALIN 25 MG: 25 CAPSULE ORAL at 08:10

## 2025-05-30 RX ADMIN — WARFARIN SODIUM 7.5 MG: 7.5 TABLET ORAL at 17:41

## 2025-05-30 RX ADMIN — PANTOPRAZOLE SODIUM 40 MG: 40 TABLET, DELAYED RELEASE ORAL at 08:10

## 2025-05-30 RX ADMIN — CARBAMAZEPINE 200 MG: 200 TABLET ORAL at 08:10

## 2025-05-30 RX ADMIN — ROSUVASTATIN 20 MG: 20 TABLET, FILM COATED ORAL at 08:10

## 2025-05-30 RX ADMIN — Medication 100 MCG: at 08:09

## 2025-05-30 RX ADMIN — Medication 15 MG: at 08:09

## 2025-05-30 RX ADMIN — ALBUTEROL SULFATE 2 PUFF: 90 AEROSOL, METERED RESPIRATORY (INHALATION) at 17:41

## 2025-05-30 RX ADMIN — CARBAMAZEPINE 200 MG: 200 TABLET ORAL at 20:30

## 2025-05-30 RX ADMIN — FLUTICASONE PROPIONATE 1 SPRAY: 50 SPRAY, METERED NASAL at 08:13

## 2025-05-30 RX ADMIN — CYANOCOBALAMIN TAB 1000 MCG 1000 MCG: 1000 TAB at 08:09

## 2025-05-30 RX ADMIN — DULOXETINE 120 MG: 30 CAPSULE, DELAYED RELEASE ORAL at 08:08

## 2025-05-30 RX ADMIN — BUDESONIDE AND FORMOTEROL FUMARATE DIHYDRATE 2 PUFF: 160; 4.5 AEROSOL RESPIRATORY (INHALATION) at 20:31

## 2025-05-30 RX ADMIN — NITROFURANTOIN MONOHYDRATE/MACROCRYSTALS 100 MG: 75; 25 CAPSULE ORAL at 20:31

## 2025-05-30 RX ADMIN — BRIVARACETAM 150 MG: 50 TABLET, FILM COATED ORAL at 08:08

## 2025-05-30 RX ADMIN — SENNOSIDES 2 TABLET: 8.6 TABLET ORAL at 08:09

## 2025-05-30 RX ADMIN — BUDESONIDE AND FORMOTEROL FUMARATE DIHYDRATE 2 PUFF: 160; 4.5 AEROSOL RESPIRATORY (INHALATION) at 08:11

## 2025-05-30 RX ADMIN — ACETAMINOPHEN 650 MG: 325 TABLET, FILM COATED ORAL at 20:31

## 2025-05-30 RX ADMIN — CARBAMAZEPINE 400 MG: 200 TABLET ORAL at 12:21

## 2025-05-30 RX ADMIN — ACETAMINOPHEN 650 MG: 325 TABLET, FILM COATED ORAL at 13:32

## 2025-05-30 ASSESSMENT — ACTIVITIES OF DAILY LIVING (ADL)
ADLS_ACUITY_SCORE: 88
HYGIENE/GROOMING: PROMPTS
DRESS: SCRUBS (BEHAVIORAL HEALTH)
HYGIENE/GROOMING: PROMPTS
ADLS_ACUITY_SCORE: 88
DRESS: INDEPENDENT
ADLS_ACUITY_SCORE: 88
LAUNDRY: UNABLE TO COMPLETE
ADLS_ACUITY_SCORE: 88
ORAL_HYGIENE: INDEPENDENT
ADLS_ACUITY_SCORE: 88
ORAL_HYGIENE: INDEPENDENT;PROMPTS

## 2025-05-30 NOTE — PLAN OF CARE
Group Attendance:  attended full group    Time session began: 1700  Time session ended: 1730  Patient's total time in group: 30    Total # Attendees   6   Group Type psychotherapeutic     Group Topic Covered Cognitive triangle     Group Session Detail Participants received psychoeducation on cognitive behavioral therapy and the cognitive triangle. A group discussion was utilized to promote further comprehension of the content. Situations, thoughts, emotions, and behaviors were described and discussed among participants. Real-life scenarios were worked through to clarify each concept.       Patient's response to the group topic/interactions:  cooperative with task and actively engaged     Patient Details: Valentina was an active participant in the group discussion and offered scenarios for the group to work through within a CBT framework. She endorsed difficulty sleeping, we went through lifestyle factors that can be helpful and discussed her feelings of anger when waking up in the middle of the night. She stated she is proud of herself for the work she's done with vulnerable adults.          11430 - Group psychotherapy - 1 Session  Patient Active Problem List   Diagnosis    Essential hypertension    CHRIS (obstructive sleep apnea)    Seasonal and perennial allergic rhinoconjunctivitis of right eye    Closed head injury    History of thyroid cancer    Adrenal mass, left    Class 3 severe obesity due to excess calories with serious comorbidity and body mass index (BMI) of 50.0 to 59.9 in adult (H)    Nonintractable epilepsy without status epilepticus (H)    Esophageal reflux    Mixed hyperlipidemia    HLD (hyperlipidemia)    Hypothyroidism    Chronic obstructive pulmonary disease (H)    Schizoaffective disorder, depressive type (H)    DNR (do not resuscitate)    Migraine headache    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    Fracture of vertebra due to osteoporosis with routine healing, subsequent encounter    Major depressive disorder,  recurrent episode, moderate (H)    Venous stasis    Peripheral polyneuropathy    Pulmonary embolism with infarction (H)    Idiopathic osteoporosis    Physical deconditioning    PTSD (post-traumatic stress disorder)    Long term (current) use of anticoagulants    History of melanoma    Seasonal affective disorder    Vision changes    Bilateral hand pain    Problem related to housing and economic circumstances    History of pulmonary embolism    Bilateral sensorineural hearing loss    Intertrigo    Dysfunction of both eustachian tubes    Chronic intractable pain    Strain of lumbar paraspinal muscle, initial encounter    Cervical stenosis of spinal canal    Cervical radiculopathy    History of colonic polyps    Hematoma of right lower leg    Nicotine use disorder    Right knee injury, sequela    Dysuria    History of stroke    Panic attack    Depression    Age-related osteoporosis without current pathological fracture    Stage 3b chronic kidney disease (H)

## 2025-05-30 NOTE — PLAN OF CARE
BEH IP Unit Acuity Rating Score (UARS)  Patient is given one point for every criteria they meet.    CRITERIA SCORING   On a 72 hour hold, court hold, committed, stay of commitment, or revocation. 0    Patient LOS on BEH unit exceeds 20 days. 0  LOS: 2   Patient under guardianship, 55+, otherwise medically complex, or under age 11. 1   Suicide ideation without relief of precipitating factors. 1   Current plan for suicide. 0   Current plan for homicide. 0   Imminent risk or actual attempt to seriously harm another without relief of factors precipitating the attempt. 0   Severe dysfunction in daily living (ex: complete neglect for self care, extreme disruption in vegetative function, extreme deterioration in social interactions). 1   Recent (last 7 days) or current physical aggression in the ED or on unit. 0   Restraints or seclusion episode in past 72 hours. 0   Recent (last 7 days) or current verbal aggression, agitation, yelling, etc., while in the ED or unit. 0   Active psychosis. 0   Need for constant or near constant redirection (from leaving, from others, etc).  0   Intrusive or disruptive behaviors. 0   Patient requires 3 or more hours of individualized nursing care per 8-hour shift (i.e. for ADLs, meds, therapeutic interventions). 1   TOTAL 3

## 2025-05-30 NOTE — DISCHARGE INSTRUCTIONS
Behavioral Discharge Planning and Instructions    Summary: You were admitted on 5/28/2025  due to Suicidal Ideations.  You were treated by Benjamin Patterson MD and discharged on *** from [facility] to {[facility] D/C Locations:[id number]}      Main Diagnosis:   Unspecified anxiety  Unspecified Depressive disorder with SI, rule out Major depressive disorder, recurrent, severe;  Borderline personality DO, possible, histrionic PD is suspected as well.      Health Care Follow-up:   You have health insurance coverage [facility] Medicare Advantage with medical assistance.  You have medical assistance for insurance coverage. You can use [employer] - [phone] for transportation to your health care appointments if you do not have a way to get to your appointments.        Your [facility] : is coordinating your services. He is looking for assisted living for you. You want to go to the same assisted living as your  who is on an Elderly  Waiver.  Jose Morfin,  II  Behavioral Health Waiver   [address]  [address]  Cell:  [phone]  Fax:  [phone]  [email or web address]  You are receiving the following services:  Home Delivered Meals  Skilled Nursing 1x week - [facility] is will to see you on Mondays,   Homemaker with assit with personal care  [facility] - Individual Home Supports  [facility] - Personal Emergency Response   The Health Unit Corod[facility]or has faxed these discharge instructions to Fax: [phone]          Return to your psychiatric medication management provider, SHAHRZAD España on Tuesday, June 24, 2025 @ 11AM. This is an in=person appointment.  Psychiatry:  [facility] - [phone]  [address] [place] [id number]  The Health Unit Coordinator has faxed these discharge instructions to Fax: [phone]         Patient Navigation Hub:   [facility] Navigators work to be your point-of-contact for trustworthy and compassionate care from Inpatient services to [facility] Programmatic Care. We will provide resources and communication to help guide you into programmatic care. Ultimately, our goal is to be the one-stop-shop of communication, coordination, and support for your journey to programmatic care.    Phone: [phone]    Information will be faxed to your outpatient providers to ensure a healthy continuity of care for you.     Attend all scheduled appointments with your outpatient providers. Call at least 24 hours in advance if you need to reschedule an appointment to ensure continued access to your outpatient providers.     Major Treatments, Procedures and Findings:  You were provided with: a psychiatric assessment, assessed for medical stability, medication evaluation and/or management, group therapy, individual therapy, milieu management, and medical interventions    Symptoms to Report: mood getting worse or thoughts of suicide    Early warning signs can include: increased thoughts or behaviors of suicide or self-harm     Safety and Wellness:  Take all medicines as directed.  Make no changes unless your doctor suggests them.      Follow treatment recommendations.  Refrain from alcohol and non-prescribed drugs.  If there is a concern for safety, call 911.    Resources:   Mental Health Crisis Resources  Throughout [place]: call **CRISIS (**239755)  Crisis Text Line: is available for free, 24/7 by texting [place] to 056485  Suicide Awareness Voices of Education (SAVE) (www.save.org): 382-058-LVBL (7283)  The National Suicide Prevention Lifeline is now: 988 Suicide and Crisis Lifeline. Call 988 anytime.  National [place] on Mental Illness (www.[place].johanny.org): 540.232.7369 or 260-849-8835.  Jbgm9dhmm: text the word LIFE to 07274 for immediate support and crisis intervention  Mental Health Consumer/Survivor Network of [place] (www.mhcsn.net):  369-634-1812 or 017-412-0379  Mental Health Association of [place] (www.mentalhealth.org): 237.839.3732 or 786-461-6339  Peer Support Connection [place] Warmline (PSC) 1-239.160.2247 Available from 5pm - 9am (7 days a week/365 days a year)  Call or Text [address] [place] [address] 1-394.655.5786      General Medication Instructions:   See your medication sheet(s) for instructions.   Take all medicines as directed.  Make no changes unless your doctor suggests them.   Go to all your doctor visits.  Be sure to have all your required lab tests. This way, your medicines can be refilled on time.  Do not use any drugs not prescribed by your doctor.  Avoid alcohol.    Advance Directives:   Scanned document on file with [employer]? No scanned doc  Is document scanned? Pt states no documents  Honoring Choices Your Rights Handout: Informed and given  Was more information offered? Pt declined    The Treatment team has appreciated the opportunity to work with you. If you have any questions or concerns about your recent admission, you can contact the unit which can receive your call 24 hours a day, 7 days a week. They will be able to get in touch with a Provider if needed. The unit number is [phone] .

## 2025-05-30 NOTE — PROGRESS NOTES
"CLINICAL NUTRITION SERVICES - ASSESSMENT NOTE    RECOMMENDATIONS FOR MDs/PROVIDERS TO ORDER:  None at this time    Registered Dietitian Interventions:  -Ginger ale with meals  -Ensure HP with meals - suggest discontinuing order if pt is eating % of meals    Future/Additional Recommendations:  Monitor PO intake, supplement use, weight trends.      REASON FOR ASSESSMENT  RN consult - Pt reports she has not been eating much and reports she does not have an appetite. Please discuss possible supplements (she said she would be willing to try Ensure or something similar) and/or snack options. Pt open to suggestions.    INFORMATION OBTAINED  Assessed patient in room.    NUTRITION HISTORY  Pt reports poor appetite and intake PTA, pt did not provide a timeline or typical intake. Pt reports appetite is improving since admission, requesting Ensure and ginger ale with meals.     CURRENT NUTRITION ORDERS  Diet: Regular  Snacks/Supplements: none    Allergies: NKFA    CURRENT INTAKE/TOLERANCE  % intake of meals recorded per RN notes.     LABS  Nutrition-relevant labs: Reviewed    MEDICATIONS  Nutrition-relevant medications: Reviewed    ANTHROPOMETRICS  Height: 167.6 CM (5' 6\")  Admission Weight: 152.3 kg (335 lb 11.2 oz) (05/28/25 1700)   Most Recent Weight: 152.3 kg (335 lb 11.2 oz) (05/28/25 1700)  IBW: 59.1 kg   BMI: Body mass index is 54.18 kg/m .   Weight History:   Wt Readings from Last 25 Encounters:   05/28/25 (!) 152.3 kg (335 lb 11.2 oz)   05/27/25 (!) 150.6 kg (332 lb)   05/27/25 (!) 151.3 kg (333 lb 9.6 oz)   05/22/25 (!) 153.5 kg (338 lb 8 oz)   05/11/25 (!) 158.8 kg (350 lb)   04/14/25 (!) 157.1 kg (346 lb 4.8 oz)   03/26/25 (!) 159.6 kg (351 lb 14.4 oz)   01/29/25 (!) 157.3 kg (346 lb 12.8 oz)   12/27/24 (!) 157.3 kg (346 lb 12.8 oz)   12/18/24 (!) 158 kg (348 lb 6.4 oz)   11/11/24 (!) 162.7 kg (358 lb 11.2 oz)   10/25/24 (!) 164.1 kg (361 lb 12.8 oz)   09/29/24 (!) 174.6 kg (385 lb)   09/17/24 (!) 167.8 " kg (370 lb)   09/12/24 (!) 167.7 kg (369 lb 12.8 oz)   08/28/24 (!) 165.1 kg (364 lb)   08/13/24 (!) 165.3 kg (364 lb 8 oz)   08/08/24 (!) 167.4 kg (369 lb)   06/03/24 (!) 166.6 kg (367 lb 3.2 oz)   05/30/24 (!) 165.1 kg (364 lb)   05/21/24 (!) 160.2 kg (353 lb 3.2 oz)   05/14/24 (!) 161 kg (354 lb 14.4 oz)   04/30/24 (!) 159.9 kg (352 lb 8.3 oz)   04/25/24 (!) 159.9 kg (352 lb 9.6 oz)   04/01/24 (!) 163.1 kg (359 lb 9.6 oz)   8.6% weight loss in 1 year, weight fluctuations noted    Dosing Weight: 152.3 kg, based on actual wt    ASSESSED NUTRITION NEEDS  Estimated Energy Needs: 2503 kcals/day ([place] [employer]or)  Justification: Maintenance  Estimated Protein Needs: 122 - 152 grams protein/day (0.8 - 1 grams of pro/kg)  Justification: Obesity guidelines  Estimated Fluid Needs: 1 mL/kcal  Justification: Maintenance    SYSTEM AND PHYSICAL FINDINGS    GI symptoms: constipation, LBM 1 week ago per pt - encouraged adequate intake of meals/fluids  Skin/wounds: Reviewed    MALNUTRITION  % Intake: Decreased intake does not meet criteria  % Weight Loss: Weight loss does not meet criteria   Subcutaneous Fat Loss: None observed  Muscle Loss: None observed  Fluid Accumulation/Edema: None noted  Malnutrition Diagnosis: Patient does not meet two of the established criteria necessary for diagnosing malnutrition  Malnutrition Present on Admission: No    NUTRITION DIAGNOSIS  Inadequate oral intake related to decreased appetite as evidenced by % intake of meals.    INTERVENTIONS  See nutrition interventions above    GOALS  Patient to consume % of nutritionally adequate meal trays TID, or the equivalent with supplements/snacks.     MONITORING/EVALUATION  Progress toward goals will be monitored and evaluated per policy.    Joelle Merino MPH, RDN, LD  Behavioral Health Adult & Pediatric Dietitian  BEH Clinical Dietitian Vocera, Teams, or Desk: [phone]  Weekend/Holiday Vocera: Weekend Holiday Clinical Dietitian [Multi Site  Groups]

## 2025-05-30 NOTE — PLAN OF CARE
Rehab Group    Start time: 13:15  End time: 14:05  Patient time total: 35 minutes    attended partial group    #4 attended   Group Type: occupational therapy   Group Topic Covered: cognitive activities, coping skills, healthy leisure time, and social skills     Group Session Detail:  Patient engaged in a leisure based activity (Garbage card game) in order to: increase concentration, attend to task, promote memory recall, manage symptoms, and maximize social and cognitive wellness.      Patient Response/Contribution:  cooperative with task, listened actively, attentive, and actively engaged     Patient Detail:  Patient entered group late. Patient was willing to learn a novel leisure activity with peers. Patient listened actively to the instructions of task and had a good return demonstration of understanding. Patient was able to engage independently; good attention and focus sustained. Appropriately utilized and responded to humor. Patient endorsed a positive response to the activity at the conclusion. Patient assisted with clean-up of supplies without prompting at the conclusion of group.       [id number] OT Group (2 or more in attendance)      Patient Active Problem List   Diagnosis    Essential hypertension    CHRIS (obstructive sleep apnea)    Seasonal and perennial allergic rhinoconjunctivitis of right eye    Closed head injury    History of thyroid cancer    Adrenal mass, left    Class 3 severe obesity due to excess calories with serious comorbidity and body mass index (BMI) of 50.0 to 59.9 in adult (H)    Nonintractable epilepsy without status epilepticus (H)    Esophageal reflux    Mixed hyperlipidemia    HLD (hyperlipidemia)    Hypothyroidism    Chronic obstructive pulmonary disease (H)    Schizoaffective disorder, depressive type (H)    DNR (do not resuscitate)    Migraine headache    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    Fracture of vertebra due to osteoporosis with routine healing, subsequent encounter    Major  depressive disorder, recurrent episode, moderate (H)    Venous stasis    Peripheral polyneuropathy    Pulmonary embolism with infarction (H)    Idiopathic osteoporosis    Physical deconditioning    PTSD (post-traumatic stress disorder)    Long term (current) use of anticoagulants    History of melanoma    Seasonal affective disorder    Vision changes    Bilateral hand pain    Problem related to housing and economic circumstances    History of pulmonary embolism    Bilateral sensorineural hearing loss    Intertrigo    Dysfunction of both eustachian tubes    Chronic intractable pain    Strain of lumbar paraspinal muscle, initial encounter    Cervical stenosis of spinal canal    Cervical radiculopathy    History of colonic polyps    Hematoma of right lower leg    Nicotine use disorder    Right knee injury, sequela    Dysuria    History of stroke    Panic attack    Depression    Age-related osteoporosis without current pathological fracture    Stage 3b chronic kidney disease (H)

## 2025-05-30 NOTE — PLAN OF CARE
Team Note Due:  Thursday    Assessment/Intervention/Current Symtoms and Care Coordination:  Chart review and met with team, discussed pt progress, symptomology, and response to treatment.  Discussed the discharge plan and any potential impediments to discharge.    Team Meeting  Medically there is only a UTI to be treated. Pt does have epilespy and needs levels drawan.  Pt does not have a plan for suicide.      Herlinda from [employer] @ [phone] called and ray that they would be terminating her as they can not meet her mental health needs. There have been multiple ED  visits and hospitalizations.  Pt is crying all the time. Pt feels she is doomed and has anxiety  Medically they have been getting buble packs from [employer] and managing the INR.  They have asked the  - Jose Salmeron @ [phone] to get a new home [facility] agency but so far he hasn't.    I placed a call to the  and left a generic voice mail. His greeting didn ot indicate the type of  that he is.     Discharge Plan or Goal:  Home with services         Barriers to Discharge:    Pt may want to extend her stay       Referral Status:      Insurance Coverage for [employer]  [facility] Medicare Advantage with KARI Pate from [employer] @ [phone]      : Jose Salmeron @ [phone]       Legal Status:  Voluntary         Contacts (include TRAVIS status):              Upcoming Meetings and Dates/Important Information and next steps:

## 2025-05-30 NOTE — PLAN OF CARE
Problem: Adult Behavioral Health Plan of Care  Goal: Optimized Coping Skills in Response to Life Stressors  Outcome: Progressing   Goal Outcome Evaluation:             Pt presents with a labile and depressed affect, visible in the lounge late morning for breakfast, teary stating she doesn't know why she crying, eat 60% of breakfast with her head down, isolative and withdrawn avoids social contact, periods of watching TV. Pt self propels own W/C without difficulties. Attended and participated in all group working on word find and playing games, pt states that Dr. REID recommends group attendance.     Pt. Initially refused AM lab draw, later on denied refusing and agreed. Seizure med held until lab draw.    Meet with the dietician, pt reports it going okay.     Pt. Endorses anxiety and depression 6/10, denies SI/HI/SIB and hallucinations. Medication compliance and contracted for safety.    Late afternoon pt. Sitting on the corner of the bed, attempting to transfer to W/C with staff assist, pt unsteady on her feet and slid along the side of the bed sitting on the floor mat, pt teary and anxious. Staff x 4 for postioning and redirection to sit in her W/C. Medical bed ordered and patient placed on a SIO for safety. Pt C/O of back pain PRN Tylenol helpful.    Blood pressure 125/77, pulse 67, temperature 97.5  F (36.4  C), temperature source Temporal, resp. rate 17, weight (!) 152.3 kg (335 lb 11.2 oz), SpO2 95%, not currently breastfeeding.

## 2025-05-30 NOTE — PLAN OF CARE
Problem: Adult Behavioral Health Plan of Care  Goal: Plan of Care Review  Outcome: Progressing     Problem: Sleep Disturbance  Goal: Adequate Sleep/Rest  Outcome: Progressing   Goal Outcome Evaluation:       Pt in bed sleeping upon arrival. She continued sleeping comfortably with even and non labored respirations as noted during all safety checks. The pt slept for 6 hours. Up and out to fetch water, then returned back to bed and went to sleep. No safety or behavioral concerns noted or verbalized. Will continue with same plan of care.

## 2025-05-30 NOTE — PLAN OF CARE
"Patient has spent the early part of the shift in the milieu playing cards by herself. Spent the latter part of the evening in her room resting. Stated that \" as long as I'm here\" she is not having any suicidal ideation or self injurious thoughts. Reports being anxious and depressed. Denies auditory and visual hallucinations. Med compliant. Affect is flat and appears nervous. Cooperative on the unit.     Patient evaluation continues. Assessed mood,anxiety,thoughts and behavior.     Patient gradually progressing towards goals.    Patient is encouraged to participate in groups and assisted to develop healthy coping skills.     VS reviewed: /77   Pulse 67   Temp 97.5  F (36.4  C) (Temporal)   Resp 17   Wt (!) 152.3 kg (335 lb 11.2 oz)   LMP  (LMP Unknown)   SpO2 95%   BMI 54.18 kg/m      Length of stay: 1    Refer to daily team meeting notes for individualized plan of care. Nursing will continue to assess.    "

## 2025-05-30 NOTE — PROGRESS NOTES
"[facility], [place]   Psychiatric Progress Note        Interim History:   The patient's care was discussed with the treatment team during the daily team meeting and/or staff's chart notes were reviewed.  Staff report patient slept for about 6 hours last night. She was reported to be mostly compliant with meds and cares, but today am refused blood work, though, later on agreed to do it. Appeared to be somewhat dramatic and attention seeking in her presentation, likely, exaggerating her symptoms. Stated to staff that she felt safe as long as she was here, at this hospital, see RN's note below:    \"Patient has spent the early part of the shift in the milieu playing cards by herself. Spent the latter part of the evening in her room resting. Stated that \" as long as I'm here\" she is not having any suicidal ideation or self injurious thoughts. Reports being anxious and depressed. Denies auditory and visual hallucinations. Med compliant. Affect is flat and appears nervous. Cooperative on the unit.      Patient evaluation continues. Assessed mood,anxiety,thoughts and behavior.      Patient gradually progressing towards goals.     Patient is encouraged to participate in groups and assisted to develop healthy coping skills.      VS reviewed: /77   Pulse 67   Temp 97.5  F (36.4  C) (Temporal)   Resp 17   Wt (!) 152.3 kg (335 lb 11.2 oz)   LMP  (LMP Unknown)   SpO2 95%   BMI 54.18 kg/m \"    Met with patient: patient was sitting in a wheelchair in this unit's corridor. She agreed to go to the conference room where I met with her. Stated that her mood was: \"so so, because I can't stop crying\". She frequently interrupted our visit with episodes looking like crying, though, no genuine tears were witnessed. She would not answer question about presence of Suicidal ideation, but repeatedly told me that she felt safe being at this hospital. I asked about [place] told me that she would not return to [facility]. She, however, agreed with recommendation to increase her Abilify dose and, then, followed me when I suggested to take her to ongoing group.     Shortly after visit with the patient I was informed by RN that staff witnessed patient to slide down from her bed. It didn't look intentional and patient didn't harm herself. We decided to put her on SIO for fall risk and provide her with a medical bed.         Medications:     Current Facility-Administered Medications   Medication Dose Route Frequency Provider Last Rate Last Admin    [START ON 5/31/2025] ARIPiprazole (ABILIFY) tablet 20 mg  20 mg Oral QAM Benjamin Patterson MD        Brivaracetam (BRIVIACT) tablet 150 mg  150 mg Oral BID Benjamin Patterson MD   150 mg at 05/30/25 0808    budesonide-formoterol (SYMBICORT/BREYNA) 160-4.5 MCG/ACT inhaler 2 puff  2 puff Inhalation BID Benjamin Patterson MD   2 puff at 05/30/25 0811    carBAMazepine (TEGretol) tablet 200 mg  200 mg Oral BID Benjamin Patterson MD   200 mg at 05/30/25 0810    carBAMazepine (TEGretol) tablet 400 mg  400 mg Oral Daily Jim Beasley PA-C   400 mg at 05/30/25 1221    cyanocobalamin (VITAMIN B-12) tablet 1,000 mcg  1,000 mcg Oral Daily Benjamin Patterson MD   1,000 mcg at 05/30/25 0809    DULoxetine (CYMBALTA) DR capsule 120 mg  120 mg Oral Daily Benjamin Patterson MD   120 mg at 05/30/25 0808    fluticasone (FLONASE) 50 MCG/ACT spray 1 spray  1 spray Both Nostrils BID Benjamin Patterson MD   1 spray at 05/30/25 0813    levothyroxine (SYNTHROID/LEVOTHROID) tablet 300 mcg  300 mcg Oral QAM AC Benjamin Patterson MD   300 mcg at 05/30/25 0810    nitroFURantoin macrocrystal-monohydrate (MACROBID) capsule 100 mg  100 mg Oral Q12H SIRISHA (08/20) Jim Beasley PA-C   100 mg at 05/30/25 0810    pantoprazole (PROTONIX) EC tablet 40 mg  40 mg Oral VÍCTORM AC Benjamin Patterson MD   40  mg at 05/30/25 0810    prazosin (MINIPRESS) capsule 2 mg  2 mg Oral At Bedtime Benjamin Patterson MD   2 mg at 05/29/25 2107    [START ON 5/31/2025] predniSONE (DELTASONE) tablet 10 mg  10 mg Oral Daily Benjamin Patterson MD        Followed by    [START ON 6/3/2025] predniSONE (DELTASONE) tablet 5 mg  5 mg Oral Daily Benjamin Patterson MD        pregabalin (LYRICA) capsule 25 mg  25 mg Oral BID Benjamin Patterson MD   25 mg at 05/30/25 0810    QUEtiapine (SEROquel) half-tab 150 mg  150 mg Oral At Bedtime Benjamin Patterson MD   150 mg at 05/29/25 2107    rosuvastatin (CRESTOR) tablet 20 mg  20 mg Oral Daily Benjamin Patterson MD   20 mg at 05/30/25 0810    sennosides (SENOKOT) tablet 2 tablet  2 tablet Oral BID Benjamin Patterson MD   2 tablet at 05/30/25 0809    Vitamin D3 (CHOLECALCIFEROL) tablet 100 mcg  100 mcg Oral Daily Benjamin Patterson MD   100 mcg at 05/30/25 0809    warfarin ANTICOAGULANT (COUMADIN/JANTOVEN) tablet 7.5 mg  7.5 mg Oral ONCE at 18:00 Benjamin Patterson MD        Warfarin Dose Required Daily - Pharmacist Managed  1 each Oral See Admin Instructions Jim Beasley PA-C              Allergies:     Allergies   Allergen Reactions    Aspirin Shortness Of Breath    Bees Anaphylaxis    Lamotrigine Dizziness and Unknown    Latex Itching    Phenobarbital      aggression    Vistaril [Hydroxyzine] Other (See Comments)     Sluggish, unable to wake up    Gabapentin Rash          Labs:     Recent Results (from the past 24 hours)   INR    Collection Time: 05/30/25 11:14 AM   Result Value Ref Range    INR 2.48 (H) 0.85 - 1.15    PT 27.1 (H) 11.8 - 14.8 Seconds          Psychiatric Examination:     /77   Pulse 67   Temp 97.5  F (36.4  C) (Temporal)   Resp 17   Wt (!) 152.3 kg (335 lb 11.2 oz)   LMP  (LMP Unknown)   SpO2 95%   BMI 54.18 kg/m    Weight is 335 lbs 11.2 oz  Body mass index is 54.18 kg/m .    Orthostatic Vitals       None          Appearance:  awake, alert, dressed in hospital scrubs, and moderately obese  Attitude:  somewhat cooperative  Eye Contact:  fair  Mood:  anxious and depressed  Affect:  intensity is dramatic and labile  Speech:  clear, coherent  Psychomotor Behavior:  physical agitation  Throught Process:  linear and goal oriented  Associations:  no loose associations  Thought Content:  no evidence of psychotic thought and passive suicidal ideation present  Insight:  limited  Judgement:  limited  Oriented to:  time, person, and place  Attention Span and Concentration:  fair  Recent and Remote Memory:  limited    Clinical Global Impressions  First: 7/4 5/30/2025      Most recent:            Precautions:     Behavioral Orders   Procedures    Code 1 - Restrict to Unit    Fall precautions    Routine Programming     As clinically indicated    Seizure precautions    Status 15     Every 15 minutes.    Status Individual Observation     Patient SIO status reviewed with team/RN.  Please also refer to RN/team documentation for add'l detail.    -SIO staff to monitor following which have contributed to patient being on SIO:  Fall risk.  -Possible interventions SIO staff could use to support patient's treatment progress:  Staying close to the patient, helping with transfers and ambulation  -When following observed, team will review discontinuation of SIO:  More steady on her feet, decreased risk of falls.     CONTINUOUS 24 hours / day:   5 feet     Indications for SIO:   Self-injury risk    Suicide precautions: Suicide Risk: MODERATE; Clinical rationale to override score: response to medication     Patients on Suicide Precautions should have a Combination Diet ordered that includes a Diet selection(s) AND a Behavioral Tray selection for Safe Tray - with utensils, or Safe Tray - NO utensils       Suicide Risk:   MODERATE     Clinical rationale to override score::   response to medication          DIagnoses:     Unspecified anxiety  Unspecified Depressive  disorder with SI, rule out Major depressive disorder, recurrent, severe;  Borderline personality DO, possible, histrionic PD is suspected as well.         Plan:     Will continue inpatient stabilization. Will increase Abilify dose, will as of 5/30/2025 put on SIO for fall risk.    Total time spent was 37 minutes. Over 50% of times was spent counseling and coordination of care regarding coping skills, medication and discharge planning.

## 2025-05-31 LAB
INR PPP: 2.04 (ref 0.85–1.15)
PROTHROMBIN TIME: 23.5 SECONDS (ref 11.8–14.8)

## 2025-05-31 PROCEDURE — 85610 PROTHROMBIN TIME: CPT

## 2025-05-31 PROCEDURE — 250N000013 HC RX MED GY IP 250 OP 250 PS 637

## 2025-05-31 PROCEDURE — 97150 GROUP THERAPEUTIC PROCEDURES: CPT | Mod: GO

## 2025-05-31 PROCEDURE — 250N000013 HC RX MED GY IP 250 OP 250 PS 637: Performed by: PSYCHIATRY & NEUROLOGY

## 2025-05-31 PROCEDURE — 124N000002 HC R&B MH UMMC

## 2025-05-31 PROCEDURE — 250N000012 HC RX MED GY IP 250 OP 636 PS 637: Performed by: PSYCHIATRY & NEUROLOGY

## 2025-05-31 PROCEDURE — 36415 COLL VENOUS BLD VENIPUNCTURE: CPT

## 2025-05-31 RX ORDER — WARFARIN SODIUM 7.5 MG/1
7.5 TABLET ORAL
Status: COMPLETED | OUTPATIENT
Start: 2025-05-31 | End: 2025-05-31

## 2025-05-31 RX ADMIN — FLUTICASONE PROPIONATE 1 SPRAY: 50 SPRAY, METERED NASAL at 22:03

## 2025-05-31 RX ADMIN — CARBAMAZEPINE 200 MG: 200 TABLET ORAL at 08:25

## 2025-05-31 RX ADMIN — LORAZEPAM 0.5 MG: 0.5 TABLET ORAL at 18:37

## 2025-05-31 RX ADMIN — WARFARIN SODIUM 7.5 MG: 7.5 TABLET ORAL at 18:16

## 2025-05-31 RX ADMIN — ACETAMINOPHEN 650 MG: 325 TABLET, FILM COATED ORAL at 18:38

## 2025-05-31 RX ADMIN — SENNOSIDES 2 TABLET: 8.6 TABLET ORAL at 08:24

## 2025-05-31 RX ADMIN — ARIPIPRAZOLE 20 MG: 10 TABLET ORAL at 08:24

## 2025-05-31 RX ADMIN — Medication 150 MG: at 22:01

## 2025-05-31 RX ADMIN — BUDESONIDE AND FORMOTEROL FUMARATE DIHYDRATE 2 PUFF: 160; 4.5 AEROSOL RESPIRATORY (INHALATION) at 08:26

## 2025-05-31 RX ADMIN — DULOXETINE 120 MG: 30 CAPSULE, DELAYED RELEASE ORAL at 08:24

## 2025-05-31 RX ADMIN — CARBAMAZEPINE 200 MG: 200 TABLET ORAL at 22:01

## 2025-05-31 RX ADMIN — LEVOTHYROXINE SODIUM 300 MCG: 0.15 TABLET ORAL at 08:25

## 2025-05-31 RX ADMIN — NITROFURANTOIN MONOHYDRATE/MACROCRYSTALS 100 MG: 75; 25 CAPSULE ORAL at 22:01

## 2025-05-31 RX ADMIN — PANTOPRAZOLE SODIUM 40 MG: 40 TABLET, DELAYED RELEASE ORAL at 08:25

## 2025-05-31 RX ADMIN — SENNOSIDES 2 TABLET: 8.6 TABLET ORAL at 22:01

## 2025-05-31 RX ADMIN — BUDESONIDE AND FORMOTEROL FUMARATE DIHYDRATE 2 PUFF: 160; 4.5 AEROSOL RESPIRATORY (INHALATION) at 22:02

## 2025-05-31 RX ADMIN — ALBUTEROL SULFATE 2 PUFF: 90 AEROSOL, METERED RESPIRATORY (INHALATION) at 22:02

## 2025-05-31 RX ADMIN — FLUTICASONE PROPIONATE 1 SPRAY: 50 SPRAY, METERED NASAL at 08:26

## 2025-05-31 RX ADMIN — ROSUVASTATIN 20 MG: 20 TABLET, FILM COATED ORAL at 08:25

## 2025-05-31 RX ADMIN — CYANOCOBALAMIN TAB 1000 MCG 1000 MCG: 1000 TAB at 08:25

## 2025-05-31 RX ADMIN — BRIVARACETAM 150 MG: 50 TABLET, FILM COATED ORAL at 22:01

## 2025-05-31 RX ADMIN — Medication 100 MCG: at 08:24

## 2025-05-31 RX ADMIN — CARBAMAZEPINE 400 MG: 200 TABLET ORAL at 12:25

## 2025-05-31 RX ADMIN — PREGABALIN 25 MG: 25 CAPSULE ORAL at 22:01

## 2025-05-31 RX ADMIN — NITROFURANTOIN MONOHYDRATE/MACROCRYSTALS 100 MG: 75; 25 CAPSULE ORAL at 08:25

## 2025-05-31 RX ADMIN — PREGABALIN 25 MG: 25 CAPSULE ORAL at 08:25

## 2025-05-31 RX ADMIN — BRIVARACETAM 150 MG: 50 TABLET, FILM COATED ORAL at 08:24

## 2025-05-31 RX ADMIN — ACETAMINOPHEN 650 MG: 325 TABLET, FILM COATED ORAL at 13:24

## 2025-05-31 RX ADMIN — PREDNISONE 10 MG: 5 TABLET ORAL at 08:24

## 2025-05-31 RX ADMIN — PRAZOSIN HYDROCHLORIDE 2 MG: 2 CAPSULE ORAL at 22:01

## 2025-05-31 RX ADMIN — TRAZODONE HYDROCHLORIDE 50 MG: 50 TABLET ORAL at 22:01

## 2025-05-31 ASSESSMENT — ACTIVITIES OF DAILY LIVING (ADL)
ADLS_ACUITY_SCORE: 88
ADLS_ACUITY_SCORE: 88
ORAL_HYGIENE: INDEPENDENT
ADLS_ACUITY_SCORE: 88
LAUNDRY: UNABLE TO COMPLETE
HYGIENE/GROOMING: PROMPTS
ADLS_ACUITY_SCORE: 88
ADLS_ACUITY_SCORE: 88
ADLS_ACUITY_SCORE: 94
ADLS_ACUITY_SCORE: 88
DRESS: INDEPENDENT
ADLS_ACUITY_SCORE: 88
HYGIENE/GROOMING: PROMPTS
ADLS_ACUITY_SCORE: 94
ADLS_ACUITY_SCORE: 94
ADLS_ACUITY_SCORE: 88
ADLS_ACUITY_SCORE: 88
DRESS: SCRUBS (BEHAVIORAL HEALTH)
ADLS_ACUITY_SCORE: 88
ADLS_ACUITY_SCORE: 88
ORAL_HYGIENE: INDEPENDENT
ADLS_ACUITY_SCORE: 88

## 2025-05-31 NOTE — PLAN OF CARE
Problem: Adult Behavioral Health Plan of Care  Goal: Adheres to Safety Considerations for Self and Others  Outcome: Progressing   Goal Outcome Evaluation:         Pt. SIO for safety       Pt presents with a depressed and labile affect, teary at times. Hypoactive quiet and withdrawn. Spent half the shift resting in bed Visible in the lounge for meals, excellent appetite. Independent with transfers and toileting , able to self propel own W/C around the milieu. Attended and participated in groups playing games, pt. Reports enjoying group.  C/O of neck pain from W/C, pt. transferred to rocking chair with a towel placed behind her neck. PRN Tylenol administered, effective pt. Reports it helping.    Speech is clear and coherent, thought process appears organized with periods of disorganization with outburst of crying.    Pt. Unable to answer assessment questions of depression and anxiety, became teary. Denies SI/HI/SIB and hallucinations.    Blood pressure 115/87, pulse 91, temperature 98  F (36.7  C), temperature source Oral, resp. rate 17, weight (!) 152.3 kg (335 lb 11.2 oz), SpO2 97%, not currently breastfeeding.

## 2025-05-31 NOTE — PLAN OF CARE
Problem: Sleep Disturbance  Goal: Adequate Sleep/Rest  Outcome: Progressing   Goal Outcome Evaluation:             NOC Shift Report   (5/30/25 into 05/31/25)    Pt in bed at beginning of shift, breathing quiet and unlabored. Pt slept through shift. Pt slept 5.5 hours. No pt complaints or concerns at this time. No PRNs requested or given. Will continue to monitor via Q15 minute safety checks. Will continue to monitor and assess.

## 2025-05-31 NOTE — CARE PLAN
Rehab Group    Start time: 1115  End time: 1200  Patient time total: 16 minutes    attended partial group    #7 attended   Group Type: occupational therapy   Group Topic Covered: balanced lifestyle, cognitive activities, coping skills, educational support, emotional regulation, healthy leisure time, substance use/recovery , self-esteem, and social skills       Group Session Detail:    Education and group discussion on the benefits of affirmation for mental health and recovery with hands on Affirmation Calendars for concentration, creative expression, follow through, decision making, fostering hope for a sustainable recovery, changing negative thoughts to positive, coping with stress, mood stabilization, reality-based activity, building self-esteem, and socialization.        Patient Response/Contribution:  socially appropriate and actively engaged       Patient Detail:  Pt joined for the final 1/3 of the group session.  She was pleasant and demonstrated an understanding after a description and directions were given.  Pt worked steadily while present and was invested in the various affirmations that she chose.  Pt verbalized regret that she hadn't heard the announcement and had missed the earlier part of the group session.  She added number, affirmations and decorations to her calendar appropriately.           [id number] OT Group (2 or more in attendance)      Patient Active Problem List   Diagnosis    Essential hypertension    CHRIS (obstructive sleep apnea)    Seasonal and perennial allergic rhinoconjunctivitis of right eye    Closed head injury    History of thyroid cancer    Adrenal mass, left    Class 3 severe obesity due to excess calories with serious comorbidity and body mass index (BMI) of 50.0 to 59.9 in adult (H)    Nonintractable epilepsy without status epilepticus (H)    Esophageal reflux    Mixed hyperlipidemia    HLD (hyperlipidemia)    Hypothyroidism    Chronic obstructive pulmonary disease (H)     Schizoaffective disorder, depressive type (H)    DNR (do not resuscitate)    Migraine headache    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    Fracture of vertebra due to osteoporosis with routine healing, subsequent encounter    Major depressive disorder, recurrent episode, moderate (H)    Venous stasis    Peripheral polyneuropathy    Pulmonary embolism with infarction (H)    Idiopathic osteoporosis    Physical deconditioning    PTSD (post-traumatic stress disorder)    Long term (current) use of anticoagulants    History of melanoma    Seasonal affective disorder    Vision changes    Bilateral hand pain    Problem related to housing and economic circumstances    History of pulmonary embolism    Bilateral sensorineural hearing loss    Intertrigo    Dysfunction of both eustachian tubes    Chronic intractable pain    Strain of lumbar paraspinal muscle, initial encounter    Cervical stenosis of spinal canal    Cervical radiculopathy    History of colonic polyps    Hematoma of right lower leg    Nicotine use disorder    Right knee injury, sequela    Dysuria    History of stroke    Panic attack    Depression    Age-related osteoporosis without current pathological fracture    Stage 3b chronic kidney disease (H)

## 2025-05-31 NOTE — PLAN OF CARE
Patient has spent majority of the shift in the milieu. Was placed on SIO for fall risk. Attended group. Patient reports being depressed and anxious. Denies suicidal ideation and self injurious thoughts. Denies auditory and visual hallucinations. Affect is flat and continues to appear sad. Needed some assistance ambulating in the bathroom and to her bed. Complained of lower back pain, received prn tylenol and ice packs.  visited which went well. Med compliant. Cooperative on the unit.      Patient evaluation continues. Assessed mood,anxiety,thoughts and behavior.     Patient gradually progressing towards goals.    Patient is encouraged to participate in groups and assisted to develop healthy coping skills.     VS reviewed: /87   Pulse 91   Temp 98  F (36.7  C) (Oral)   Resp 17   Wt (!) 152.3 kg (335 lb 11.2 oz)   LMP  (LMP Unknown)   SpO2 97%   BMI 54.18 kg/m      Length of stay: 2    Refer to daily team meeting notes for individualized plan of care. Nursing will continue to assess.

## 2025-06-01 LAB
INR PPP: 1.73 (ref 0.85–1.15)
PROTHROMBIN TIME: 20.6 SECONDS (ref 11.8–14.8)

## 2025-06-01 PROCEDURE — 250N000013 HC RX MED GY IP 250 OP 250 PS 637: Performed by: PSYCHIATRY & NEUROLOGY

## 2025-06-01 PROCEDURE — 124N000002 HC R&B MH UMMC

## 2025-06-01 PROCEDURE — 36415 COLL VENOUS BLD VENIPUNCTURE: CPT

## 2025-06-01 PROCEDURE — 250N000013 HC RX MED GY IP 250 OP 250 PS 637

## 2025-06-01 PROCEDURE — 85610 PROTHROMBIN TIME: CPT

## 2025-06-01 PROCEDURE — 250N000012 HC RX MED GY IP 250 OP 636 PS 637: Performed by: PSYCHIATRY & NEUROLOGY

## 2025-06-01 RX ORDER — WARFARIN SODIUM 5 MG/1
10 TABLET ORAL
Status: COMPLETED | OUTPATIENT
Start: 2025-06-01 | End: 2025-06-01

## 2025-06-01 RX ADMIN — PRAZOSIN HYDROCHLORIDE 2 MG: 2 CAPSULE ORAL at 21:30

## 2025-06-01 RX ADMIN — CARBAMAZEPINE 200 MG: 200 TABLET ORAL at 21:31

## 2025-06-01 RX ADMIN — ACETAMINOPHEN 650 MG: 325 TABLET, FILM COATED ORAL at 18:21

## 2025-06-01 RX ADMIN — BUDESONIDE AND FORMOTEROL FUMARATE DIHYDRATE 2 PUFF: 160; 4.5 AEROSOL RESPIRATORY (INHALATION) at 21:31

## 2025-06-01 RX ADMIN — LORAZEPAM 0.5 MG: 0.5 TABLET ORAL at 18:24

## 2025-06-01 RX ADMIN — SENNOSIDES 2 TABLET: 8.6 TABLET ORAL at 21:30

## 2025-06-01 RX ADMIN — BRIVARACETAM 150 MG: 50 TABLET, FILM COATED ORAL at 07:50

## 2025-06-01 RX ADMIN — PANTOPRAZOLE SODIUM 40 MG: 40 TABLET, DELAYED RELEASE ORAL at 07:52

## 2025-06-01 RX ADMIN — FLUTICASONE PROPIONATE 1 SPRAY: 50 SPRAY, METERED NASAL at 21:31

## 2025-06-01 RX ADMIN — Medication 100 MCG: at 07:50

## 2025-06-01 RX ADMIN — BUDESONIDE AND FORMOTEROL FUMARATE DIHYDRATE 2 PUFF: 160; 4.5 AEROSOL RESPIRATORY (INHALATION) at 07:53

## 2025-06-01 RX ADMIN — WARFARIN SODIUM 10 MG: 5 TABLET ORAL at 17:28

## 2025-06-01 RX ADMIN — Medication 150 MG: at 21:30

## 2025-06-01 RX ADMIN — CARBAMAZEPINE 400 MG: 200 TABLET ORAL at 12:20

## 2025-06-01 RX ADMIN — PREGABALIN 25 MG: 25 CAPSULE ORAL at 07:52

## 2025-06-01 RX ADMIN — FLUTICASONE PROPIONATE 1 SPRAY: 50 SPRAY, METERED NASAL at 07:52

## 2025-06-01 RX ADMIN — SENNOSIDES 2 TABLET: 8.6 TABLET ORAL at 07:51

## 2025-06-01 RX ADMIN — ARIPIPRAZOLE 20 MG: 10 TABLET ORAL at 07:51

## 2025-06-01 RX ADMIN — PREGABALIN 25 MG: 25 CAPSULE ORAL at 21:31

## 2025-06-01 RX ADMIN — PREDNISONE 10 MG: 5 TABLET ORAL at 07:52

## 2025-06-01 RX ADMIN — BRIVARACETAM 150 MG: 50 TABLET, FILM COATED ORAL at 21:30

## 2025-06-01 RX ADMIN — NITROFURANTOIN MONOHYDRATE/MACROCRYSTALS 100 MG: 75; 25 CAPSULE ORAL at 07:51

## 2025-06-01 RX ADMIN — CYANOCOBALAMIN TAB 1000 MCG 1000 MCG: 1000 TAB at 07:51

## 2025-06-01 RX ADMIN — ROSUVASTATIN 20 MG: 20 TABLET, FILM COATED ORAL at 07:52

## 2025-06-01 RX ADMIN — TRAZODONE HYDROCHLORIDE 50 MG: 50 TABLET ORAL at 21:34

## 2025-06-01 RX ADMIN — TRAZODONE HYDROCHLORIDE 50 MG: 50 TABLET ORAL at 22:41

## 2025-06-01 RX ADMIN — DULOXETINE 120 MG: 30 CAPSULE, DELAYED RELEASE ORAL at 07:50

## 2025-06-01 RX ADMIN — LEVOTHYROXINE SODIUM 300 MCG: 0.15 TABLET ORAL at 07:51

## 2025-06-01 RX ADMIN — CARBAMAZEPINE 200 MG: 200 TABLET ORAL at 07:51

## 2025-06-01 RX ADMIN — NITROFURANTOIN MONOHYDRATE/MACROCRYSTALS 100 MG: 75; 25 CAPSULE ORAL at 21:30

## 2025-06-01 ASSESSMENT — ACTIVITIES OF DAILY LIVING (ADL)
ADLS_ACUITY_SCORE: 94
DRESS: INDEPENDENT
ADLS_ACUITY_SCORE: 88
ADLS_ACUITY_SCORE: 94
ADLS_ACUITY_SCORE: 88
ADLS_ACUITY_SCORE: 88
HYGIENE/GROOMING: PROMPTS;INDEPENDENT
ADLS_ACUITY_SCORE: 88
ADLS_ACUITY_SCORE: 94
ADLS_ACUITY_SCORE: 88
ADLS_ACUITY_SCORE: 94
ADLS_ACUITY_SCORE: 94
HYGIENE/GROOMING: PROMPTS;INDEPENDENT
ADLS_ACUITY_SCORE: 94
ADLS_ACUITY_SCORE: 88
DRESS: SCRUBS (BEHAVIORAL HEALTH)
ADLS_ACUITY_SCORE: 94
ADLS_ACUITY_SCORE: 88
ORAL_HYGIENE: PROMPTS;INDEPENDENT
ORAL_HYGIENE: PROMPTS;INDEPENDENT
ADLS_ACUITY_SCORE: 94
ADLS_ACUITY_SCORE: 88
ADLS_ACUITY_SCORE: 94
LAUNDRY: UNABLE TO COMPLETE
ADLS_ACUITY_SCORE: 94
ADLS_ACUITY_SCORE: 94
ADLS_ACUITY_SCORE: 88
ADLS_ACUITY_SCORE: 94

## 2025-06-01 NOTE — PLAN OF CARE
Problem: Adult Behavioral Health Plan of Care  Goal: Optimized Coping Skills in Response to Life Stressors  Outcome: Progressing   Goal Outcome Evaluation:       Delete

## 2025-06-01 NOTE — CARE PLAN
Rehab Group    Start time: 1715  End time: 1800  Patient time total: 10 minutes    attended partial group    #7 attended   Group Type: occupational therapy   Group Topic Covered: balanced lifestyle, cognitive activities, coping skills, healthy leisure time, self-esteem, and social skills       Group Session Detail:   Can You Name Five, category processing activity for concentration, cognitive challenge, memory recall, expanding general fund of knowledge, follow through, frustration tolerance, coping, mood stabilization, reality-based activity, healthy leisure exploration, building self-esteem, and expanding social skills.       Patient Response/Contribution:  socially appropriate and actively engaged       Patient Detail:  Pt joined for the final 10 min of the group session, arriving towards the end of group.  She was able to quickly understand the activity and participated actively during the remaining time.  Pt was able to generate appropriate responses for the categories that were posed to her.          [id number] OT Group (2 or more in attendance)      Patient Active Problem List   Diagnosis    Essential hypertension    CHRIS (obstructive sleep apnea)    Seasonal and perennial allergic rhinoconjunctivitis of right eye    Closed head injury    History of thyroid cancer    Adrenal mass, left    Class 3 severe obesity due to excess calories with serious comorbidity and body mass index (BMI) of 50.0 to 59.9 in adult (H)    Nonintractable epilepsy without status epilepticus (H)    Esophageal reflux    Mixed hyperlipidemia    HLD (hyperlipidemia)    Hypothyroidism    Chronic obstructive pulmonary disease (H)    Schizoaffective disorder, depressive type (H)    DNR (do not resuscitate)    Migraine headache    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    Fracture of vertebra due to osteoporosis with routine healing, subsequent encounter    Major depressive disorder, recurrent episode, moderate (H)    Venous stasis    Peripheral  polyneuropathy    Pulmonary embolism with infarction (H)    Idiopathic osteoporosis    Physical deconditioning    PTSD (post-traumatic stress disorder)    Long term (current) use of anticoagulants    History of melanoma    Seasonal affective disorder    Vision changes    Bilateral hand pain    Problem related to housing and economic circumstances    History of pulmonary embolism    Bilateral sensorineural hearing loss    Intertrigo    Dysfunction of both eustachian tubes    Chronic intractable pain    Strain of lumbar paraspinal muscle, initial encounter    Cervical stenosis of spinal canal    Cervical radiculopathy    History of colonic polyps    Hematoma of right lower leg    Nicotine use disorder    Right knee injury, sequela    Dysuria    History of stroke    Panic attack    Depression    Age-related osteoporosis without current pathological fracture    Stage 3b chronic kidney disease (H)

## 2025-06-01 NOTE — PLAN OF CARE
Problem: Adult Behavioral Health Plan of Care  Goal: Adheres to Safety Considerations for Self and Others  Outcome: Progressing     Problem: Suicide Risk  Goal: Absence of Self-Harm  Outcome: Progressing     Problem: Sleep Disturbance  Goal: Adequate Sleep/Rest  Outcome: Progressing   Goal Outcome Evaluation:  Patient continue to be on SIO 1:1 due to recent fall, suicide, and seizure precautions. Patient remained calm, and quietly slept most of the shift. No signs of distress, pain, or behavioral concerns observed or reported this shift. Patient has PT/INR lab draw this morning. Patient slept for 7 hours this shift. No other concerns at this time, team will continue to implement plan of care.

## 2025-06-01 NOTE — PLAN OF CARE
Patient has spent majority of the shift in the milieu. Attended the evening group.  visited which went well. Denies suicidal ideation and self injurious thoughts. Reports being anxious and depressed. Given prn ativan for anxiety. Denies auditory and visual hallucinations. Became very teary about the thought of her discharging on Monday. Doesn't feel like she is ready to leave. Reported having shoulder pain, received an ice pack and tylenol. Med compliant. Continues to be on SIO for fall risk.     Patient evaluation continues. Assessed mood,anxiety,thoughts and behavior.     Patient gradually progressing towards goals.    Patient is encouraged to participate in groups and assisted to develop healthy coping skills.     VS reviewed: /74   Pulse 89   Temp 98.4  F (36.9  C) (Oral)   Resp 16   Wt (!) 152.3 kg (335 lb 11.2 oz)   LMP  (LMP Unknown)   SpO2 96%   BMI 54.18 kg/m      Length of stay: 3    Refer to daily team meeting notes for individualized plan of care. Nursing will continue to assess.

## 2025-06-01 NOTE — PLAN OF CARE
Problem: Adult Behavioral Health Plan of Care  Goal: Adheres to Safety Considerations for Self and Others  Outcome: Progressing   Goal Outcome Evaluation:         Pt. SIO for safety/fall     Pt. Presents with a labile and depressed affect,  Spent majority of the shift resting in bed isolative to self, declined breakfast, easily awoken for AM medications. Pt visible in the lounge for lunch with encouragement, excellent appetite. Pt independent with toileting and transfers, self propels W/C to the lounge. Spent the afternoon in bed, pt reports feeling depressed and wanting to stay in her room. pt hypoactive quiet and withdrawn avoids social contact.    Speech is clear and coherent, maintains eye contact, thought process appears organized with assessment question, hygiene is unkempt.    Pt endorses high depression and anxiety, denies SI/HI/SIB and hallucination, medication compliance and contracted for safety.      Blood pressure 104/68, pulse 89, temperature 98.4  F (36.9  C), temperature source Oral, resp. rate 16, weight (!) 152.3 kg (335 lb 11.2 oz), SpO2 96%, not currently breastfeeding.

## 2025-06-02 LAB
INR PPP: 1.49 (ref 0.85–1.15)
PROTHROMBIN TIME: 18.4 SECONDS (ref 11.8–14.8)

## 2025-06-02 PROCEDURE — 97150 GROUP THERAPEUTIC PROCEDURES: CPT | Mod: GO

## 2025-06-02 PROCEDURE — 250N000012 HC RX MED GY IP 250 OP 636 PS 637: Performed by: PSYCHIATRY & NEUROLOGY

## 2025-06-02 PROCEDURE — 250N000013 HC RX MED GY IP 250 OP 250 PS 637

## 2025-06-02 PROCEDURE — 250N000011 HC RX IP 250 OP 636: Performed by: PSYCHIATRY & NEUROLOGY

## 2025-06-02 PROCEDURE — 250N000013 HC RX MED GY IP 250 OP 250 PS 637: Performed by: PSYCHIATRY & NEUROLOGY

## 2025-06-02 PROCEDURE — 85610 PROTHROMBIN TIME: CPT

## 2025-06-02 PROCEDURE — 99233 SBSQ HOSP IP/OBS HIGH 50: CPT | Performed by: PSYCHIATRY & NEUROLOGY

## 2025-06-02 PROCEDURE — 36415 COLL VENOUS BLD VENIPUNCTURE: CPT

## 2025-06-02 PROCEDURE — 124N000002 HC R&B MH UMMC

## 2025-06-02 RX ORDER — BENZTROPINE MESYLATE 0.5 MG/1
0.5 TABLET ORAL 2 TIMES DAILY
Status: DISCONTINUED | OUTPATIENT
Start: 2025-06-02 | End: 2025-06-05

## 2025-06-02 RX ORDER — ENOXAPARIN SODIUM 150 MG/ML
1 INJECTION SUBCUTANEOUS EVERY 12 HOURS
Status: DISCONTINUED | OUTPATIENT
Start: 2025-06-02 | End: 2025-06-02

## 2025-06-02 RX ORDER — SERTRALINE HYDROCHLORIDE 25 MG/1
25 TABLET, FILM COATED ORAL DAILY
Status: DISCONTINUED | OUTPATIENT
Start: 2025-06-02 | End: 2025-06-04

## 2025-06-02 RX ORDER — WARFARIN SODIUM 5 MG/1
10 TABLET ORAL
Status: COMPLETED | OUTPATIENT
Start: 2025-06-02 | End: 2025-06-02

## 2025-06-02 RX ORDER — ENOXAPARIN SODIUM 150 MG/ML
0.75 INJECTION SUBCUTANEOUS EVERY 12 HOURS
Status: DISPENSED | OUTPATIENT
Start: 2025-06-02

## 2025-06-02 RX ADMIN — BRIVARACETAM 150 MG: 50 TABLET, FILM COATED ORAL at 08:53

## 2025-06-02 RX ADMIN — ROSUVASTATIN 20 MG: 20 TABLET, FILM COATED ORAL at 08:54

## 2025-06-02 RX ADMIN — PREGABALIN 25 MG: 25 CAPSULE ORAL at 08:54

## 2025-06-02 RX ADMIN — BRIVARACETAM 150 MG: 50 TABLET, FILM COATED ORAL at 22:25

## 2025-06-02 RX ADMIN — CARBAMAZEPINE 200 MG: 200 TABLET ORAL at 08:54

## 2025-06-02 RX ADMIN — PREDNISONE 10 MG: 5 TABLET ORAL at 08:53

## 2025-06-02 RX ADMIN — BENZTROPINE MESYLATE 0.5 MG: 0.5 TABLET ORAL at 22:25

## 2025-06-02 RX ADMIN — CYANOCOBALAMIN TAB 1000 MCG 1000 MCG: 1000 TAB at 08:53

## 2025-06-02 RX ADMIN — Medication 150 MG: at 22:25

## 2025-06-02 RX ADMIN — Medication 100 MCG: at 08:53

## 2025-06-02 RX ADMIN — DULOXETINE 120 MG: 30 CAPSULE, DELAYED RELEASE ORAL at 08:53

## 2025-06-02 RX ADMIN — CARBAMAZEPINE 200 MG: 200 TABLET ORAL at 22:25

## 2025-06-02 RX ADMIN — LEVOTHYROXINE SODIUM 300 MCG: 0.15 TABLET ORAL at 08:53

## 2025-06-02 RX ADMIN — CARBAMAZEPINE 400 MG: 200 TABLET ORAL at 12:37

## 2025-06-02 RX ADMIN — PREGABALIN 25 MG: 25 CAPSULE ORAL at 22:26

## 2025-06-02 RX ADMIN — ENOXAPARIN SODIUM 120 MG: 120 INJECTION SUBCUTANEOUS at 22:26

## 2025-06-02 RX ADMIN — SENNOSIDES 2 TABLET: 8.6 TABLET ORAL at 08:54

## 2025-06-02 RX ADMIN — NITROFURANTOIN MONOHYDRATE/MACROCRYSTALS 100 MG: 75; 25 CAPSULE ORAL at 08:54

## 2025-06-02 RX ADMIN — SENNOSIDES 2 TABLET: 8.6 TABLET ORAL at 22:25

## 2025-06-02 RX ADMIN — WARFARIN SODIUM 10 MG: 5 TABLET ORAL at 17:38

## 2025-06-02 RX ADMIN — SERTRALINE HYDROCHLORIDE 25 MG: 25 TABLET ORAL at 14:39

## 2025-06-02 RX ADMIN — PRAZOSIN HYDROCHLORIDE 2 MG: 2 CAPSULE ORAL at 22:26

## 2025-06-02 RX ADMIN — FLUTICASONE PROPIONATE 1 SPRAY: 50 SPRAY, METERED NASAL at 08:57

## 2025-06-02 RX ADMIN — BUDESONIDE AND FORMOTEROL FUMARATE DIHYDRATE 2 PUFF: 160; 4.5 AEROSOL RESPIRATORY (INHALATION) at 23:16

## 2025-06-02 RX ADMIN — NITROFURANTOIN MONOHYDRATE/MACROCRYSTALS 100 MG: 75; 25 CAPSULE ORAL at 22:25

## 2025-06-02 RX ADMIN — LORAZEPAM 0.5 MG: 0.5 TABLET ORAL at 14:39

## 2025-06-02 RX ADMIN — BUDESONIDE AND FORMOTEROL FUMARATE DIHYDRATE 2 PUFF: 160; 4.5 AEROSOL RESPIRATORY (INHALATION) at 08:58

## 2025-06-02 RX ADMIN — ARIPIPRAZOLE 20 MG: 10 TABLET ORAL at 08:54

## 2025-06-02 RX ADMIN — ACETAMINOPHEN 650 MG: 325 TABLET, FILM COATED ORAL at 21:20

## 2025-06-02 RX ADMIN — TRAZODONE HYDROCHLORIDE 50 MG: 50 TABLET ORAL at 22:30

## 2025-06-02 RX ADMIN — PANTOPRAZOLE SODIUM 40 MG: 40 TABLET, DELAYED RELEASE ORAL at 08:53

## 2025-06-02 ASSESSMENT — ACTIVITIES OF DAILY LIVING (ADL)
ADLS_ACUITY_SCORE: 94
ADLS_ACUITY_SCORE: 94
ADLS_ACUITY_SCORE: 104
ADLS_ACUITY_SCORE: 94
ADLS_ACUITY_SCORE: 94
HYGIENE/GROOMING: PROMPTS
DRESS: INDEPENDENT
ADLS_ACUITY_SCORE: 94
LAUNDRY: UNABLE TO COMPLETE
ADLS_ACUITY_SCORE: 104
ADLS_ACUITY_SCORE: 94
ADLS_ACUITY_SCORE: 94
ADLS_ACUITY_SCORE: 104
ADLS_ACUITY_SCORE: 94
ADLS_ACUITY_SCORE: 94
ORAL_HYGIENE: INDEPENDENT
ADLS_ACUITY_SCORE: 104
ADLS_ACUITY_SCORE: 104
ADLS_ACUITY_SCORE: 94
DRESS: PROMPTS
ADLS_ACUITY_SCORE: 94
ADLS_ACUITY_SCORE: 104
ADLS_ACUITY_SCORE: 104
HYGIENE/GROOMING: PROMPTS
ADLS_ACUITY_SCORE: 94
ORAL_HYGIENE: PROMPTS
ADLS_ACUITY_SCORE: 94
ADLS_ACUITY_SCORE: 94
ADLS_ACUITY_SCORE: 104
ADLS_ACUITY_SCORE: 94

## 2025-06-02 NOTE — PLAN OF CARE
BEH IP Unit Acuity Rating Score (UARS)  Patient is given one point for every criteria they meet.    CRITERIA SCORING   On a 72 hour hold, court hold, committed, stay of commitment, or revocation. 0    Patient LOS on BEH unit exceeds 20 days. 0  LOS: 5   Patient under guardianship, 55+, otherwise medically complex, or under age 11. 1   Suicide ideation without relief of precipitating factors. 1   Current plan for suicide. 0   Current plan for homicide. 0   Imminent risk or actual attempt to seriously harm another without relief of factors precipitating the attempt. 0   Severe dysfunction in daily living (ex: complete neglect for self care, extreme disruption in vegetative function, extreme deterioration in social interactions). 1   Recent (last 7 days) or current physical aggression in the ED or on unit. 0   Restraints or seclusion episode in past 72 hours. 0   Recent (last 7 days) or current verbal aggression, agitation, yelling, etc., while in the ED or unit. 0   Active psychosis. 0   Need for constant or near constant redirection (from leaving, from others, etc).  0   Intrusive or disruptive behaviors. 0   Patient requires 3 or more hours of individualized nursing care per 8-hour shift (i.e. for ADLs, meds, therapeutic interventions). 1   TOTAL 3

## 2025-06-02 NOTE — PLAN OF CARE
Patient spent the early part of the shift in her room napping. Woke up for dinner. Continues to be depressed and anxious. Requested prn ativan. Complained of neck and shoulder pain. Received prn tylenol and ice packs. Denies suicidal ideation and self injurious thoughts. Denies auditory and visual hallucinations. Affect was a bit irritable and flat. Played cards games and social with peers and staff.     Patient evaluation continues. Assessed mood,anxiety,thoughts and behavior.     Patient gradually progressing towards goals.    Patient is encouraged to participate in groups and assisted to develop healthy coping skills.     VS reviewed: /75   Pulse 79   Temp 98.1  F (36.7  C) (Oral)   Resp 16   Wt (!) 152.3 kg (335 lb 11.2 oz)   LMP  (LMP Unknown)   SpO2 96%   BMI 54.18 kg/m      Length of stay: 4    Refer to daily team meeting notes for individualized plan of care. Nursing will continue to assess.

## 2025-06-02 NOTE — PROGRESS NOTES
"[facility], [place]   Psychiatric Progress Note        Interim History:   The patient's care was discussed with the treatment team during the daily team meeting and/or staff's chart notes were reviewed.  Staff report patient slept through the night and was reported to spend most of weekend at her bed. In the evening was seen outside playing cards with peers. Was compliant with meds, reported to staff feeling anxious about plans to discharge her this week and stated repeatedly that she was not ready for that, see RN's note below:       \"Patient has spent majority of the shift in the milieu. Attended the evening group.  visited which went well. Denies suicidal ideation and self injurious thoughts. Reports being anxious and depressed. Given prn ativan for anxiety. Denies auditory and visual hallucinations. Became very teary about the thought of her discharging on Monday. Doesn't feel like she is ready to leave. Reported having shoulder pain, received an ice pack and tylenol. Med compliant. Continues to be on SIO for fall risk.      Patient evaluation continues. Assessed mood,anxiety,thoughts and behavior.      Patient gradually progressing towards goals.     Patient is encouraged to participate in groups and assisted to develop healthy coping skills.      VS reviewed: /74   Pulse 89   Temp 98.4  F (36.9  C) (Oral)   Resp 16   Wt (!) 152.3 kg (335 lb 11.2 oz)   LMP  (LMP Unknown)   SpO2 96%   BMI 54.18 kg/m \"        Met with patient: patient was seen in the conference room in presence of student, JOSY. She wheeled into the room and participated in conversation, immediately started with telling us that she was not ready to leave and if she were to be discharged, she would return home and harm to self. She stated that she felt safe at this hospital and would like to stay here: \"until I feel better and again can be proud of myself\". We discussed with Valentina that process of " "getting better might take some time and suggested that she might not necessarily, need to stay at this hospital. We asked her if she discussed with her  and CM re: going to assisted living facility. Valentina said that she didn't but had such thoughts and would talk to her  about assisted living facility today. CTC told her that her home health care had discharged her from their care, but her CM Jacobo had found for her another home health care agency who could come and visit her at home on Monday. Valentina initially said that she could not do that: \"because they come on Monday and what do I do if need help on other days\"? Her mood was very labile and number of times she became tearful during our conversation. She exhibited high amplitude tremor in her left hand. Patient said that it might be worse after we increased Abilify from 15 to 20 mg daily. She agreed with suggestion to start her on Cogentin. We reviewed with Valentina her recent medications list, she agreed to be started on Sertraline. She denied presence of active Suicidal ideation and had no other questions or concerns.         Medications:     Current Facility-Administered Medications   Medication Dose Route Frequency Provider Last Rate Last Admin    ARIPiprazole (ABILIFY) tablet 20 mg  20 mg Oral QAM Bnejamin Patterson MD   20 mg at 06/02/25 0854    benztropine (COGENTIN) tablet 0.5 mg  0.5 mg Oral BID Bejnamin Patterson MD        Brivaracetam (BRIVIACT) tablet 150 mg  150 mg Oral BID Benjamin Patterson MD   150 mg at 06/02/25 0853    budesonide-formoterol (SYMBICORT/BREYNA) 160-4.5 MCG/ACT inhaler 2 puff  2 puff Inhalation BID Benjamin Patterson MD   2 puff at 06/02/25 0858    carBAMazepine (TEGretol) tablet 200 mg  200 mg Oral BID Benjamin Patterson MD   200 mg at 06/02/25 0854    carBAMazepine (TEGretol) tablet 400 mg  400 mg Oral Daily Jim Beasley PA-C   400 mg at 06/02/25 1237    cyanocobalamin (VITAMIN B-12) tablet 1,000 mcg  " 1,000 mcg Oral Daily Benjamin Patterson MD   1,000 mcg at 06/02/25 0853    DULoxetine (CYMBALTA) DR capsule 120 mg  120 mg Oral Daily Benjamin Patterson MD   120 mg at 06/02/25 0853    enoxaparin ANTICOAGULANT (LOVENOX) injection 120 mg  0.75 mg/kg Subcutaneous Q12H Benjamin Patterson MD        fluticasone (FLONASE) 50 MCG/ACT spray 1 spray  1 spray Both Nostrils BID Benjamin Patterson MD   1 spray at 06/02/25 0857    levothyroxine (SYNTHROID/LEVOTHROID) tablet 300 mcg  300 mcg Oral QAM AC Benjamin Patterson MD   300 mcg at 06/02/25 0853    nitroFURantoin macrocrystal-monohydrate (MACROBID) capsule 100 mg  100 mg Oral Q12H [facility] (08/20) Jim Beasley PA-C   100 mg at 06/02/25 0854    pantoprazole (PROTONIX) EC tablet 40 mg  40 mg Oral QAM AC Benjamin Patterson MD   40 mg at 06/02/25 0853    prazosin (MINIPRESS) capsule 2 mg  2 mg Oral At Bedtime Benjamin Patterson MD   2 mg at 06/01/25 2130    [START ON 6/3/2025] predniSONE (DELTASONE) tablet 5 mg  5 mg Oral Daily Benjamin Patterson MD        pregabalin (LYRICA) capsule 25 mg  25 mg Oral BID Benjamin Patterson MD   25 mg at 06/02/25 0854    QUEtiapine (SEROquel) half-tab 150 mg  150 mg Oral At Bedtime Benjamin Patterson MD   150 mg at 06/01/25 2130    rosuvastatin (CRESTOR) tablet 20 mg  20 mg Oral Daily Benjamin Patterson MD   20 mg at 06/02/25 0854    sennosides (SENOKOT) tablet 2 tablet  2 tablet Oral BID Benjamin Patterson MD   2 tablet at 06/02/25 0854    sertraline (ZOLOFT) tablet 25 mg  25 mg Oral Daily Benjamin Patterson MD   25 mg at 06/02/25 1439    Vitamin D3 (CHOLECALCIFEROL) tablet 100 mcg  100 mcg Oral Daily Benjamin Patterson MD   100 mcg at 06/02/25 0853    warfarin ANTICOAGULANT (COUMADIN/JANTOVEN) tablet 10 mg  10 mg Oral ONCE at 18:00 Benjamin Patterson MD        Warfarin Dose Required Daily - Pharmacist Managed  1 each Oral See Admin Instructions Jim Beasley PA-C               Allergies:     Allergies   Allergen Reactions    Aspirin Shortness Of Breath    Bees Anaphylaxis    Lamotrigine Dizziness and Unknown    Latex Itching    Phenobarbital      aggression    Vistaril [Hydroxyzine] Other (See Comments)     Sluggish, unable to wake up    Gabapentin Rash          Labs:     Recent Results (from the past 24 hours)   INR    Collection Time: 06/02/25  8:13 AM   Result Value Ref Range    INR 1.49 (H) 0.85 - 1.15    PT 18.4 (H) 11.8 - 14.8 Seconds          Psychiatric Examination:     /71   Pulse 62   Temp 98.1  F (36.7  C) (Oral)   Resp 16   Wt (!) 152.3 kg (335 lb 11.2 oz)   LMP  (LMP Unknown)   SpO2 94%   BMI 54.18 kg/m    Weight is 335 lbs 11.2 oz  Body mass index is 54.18 kg/m .    Orthostatic Vitals         Most Recent      Lying Orthostatic /68 06/01 1300    Lying Orthostatic Pulse (bpm) 74 06/01 1300           Appearance: awake, alert, dressed in hospital scrubs, and moderately obese  Attitude:  minimally cooperative  Eye Contact: partial  Mood:  anxious and depressed  Affect:  intensity is dramatic and labile, tearful at times  Speech:  clear, coherent  Psychomotor Behavior:  physical agitation  Throught Process:  linear and goal oriented  Associations:  no loose associations  Thought Content:  no evidence of psychotic thought and passive suicidal ideation present  Insight:  limited  Judgement:  limited  Oriented to:  time, person, and place  Attention Span and Concentration:  fair  Recent and Remote Memory:  limited    Clinical Global Impressions  First: 7/4 5/30/2025      Most recent:            Precautions:     Behavioral Orders   Procedures    Code 1 - Restrict to Unit    Fall precautions    Routine Programming     As clinically indicated    Seizure precautions    Status 15     Every 15 minutes.    Status Individual Observation     Patient SIO status reviewed with team/RN.  Please also refer to RN/team documentation for add'l detail.    -SIO staff to monitor  following which have contributed to patient being on SIO:  Fall risk  -Possible interventions SIO staff could use to support patient's treatment progress:  Staying close to patient, helping with transfers and during ambulation  -When following observed, team will review discontinuation of SIO:  Will stop SIO when is more steady on feet, decreased risk of falls.     CONTINUOUS 24 hours / day:   5 feet     Indications for SIO:   Self-injury risk    Suicide precautions: Suicide Risk: MODERATE; Clinical rationale to override score: response to medication     Patients on Suicide Precautions should have a Combination Diet ordered that includes a Diet selection(s) AND a Behavioral Tray selection for Safe Tray - with utensils, or Safe Tray - NO utensils       Suicide Risk:   MODERATE     Clinical rationale to override score::   response to medication          DIagnoses:     Unspecified anxiety  Unspecified Depressive disorder with SI, rule out Major depressive disorder, recurrent, severe;  Borderline personality DO, possible, histrionic PD is suspected as well.         Plan:     Will continue inpatient stabilization. Will start on Zoloft and Cogentin as of 6/2/2025. Will work on discharge planning. Patient agreed to start seeing therapist after discharge.    Total time spent was 50 minutes. Over 50% of times was spent counseling and coordination of care regarding coping skills, medication and discharge planning.

## 2025-06-02 NOTE — PLAN OF CARE
Team Note Due:  Thursday    Assessment/Intervention/Current Symtoms and Care Coordination:  Chart review and met with team, discussed pt progress, symptomology, and response to treatment.  Discussed the discharge plan and any potential impediments to discharge.    Team Meeting  I informed team that the Home Health Agency has terminated with the pt.  MD would like to look at discharge soon.    I received a return vm from the [facility]  and had to leave him a vm.  We did connect.   He tell me the patient is receiving these services:  Home Delivered Meals  Skilled Nursing 1x week - [facility] is will to see pt on Mondays, pt has to confirm this is okay  Homemaker with assit with personal care  [facility] - Individual Home Supports  [facility] - Personal Emergency Response    MD, PA student and I met with pt.  SHe cried and said she was depressed, and something would happen if she went home.  She said she is not ready to go home.  She discussed medication changes.  SHe agreed to have the new home health agency come on Mondays.  She wants to talk about assisted lving with the [facility].     I then messaged the [facility] to call the pt on the unit and that she agreed to the [facility] referral.    Discharge Plan or Goal:  Home with services         Barriers to Discharge:    Pt may want to extend her stay       Referral Status:      Insurance Coverage for SErvices  [facility]/[employer] Medicare Advantage with KARI Pate from [facility] [employer] [facility] @ [phone]      [facility] :   Jose Salmeron   Phone@ [phone]  [facility]  Fax: [phone]  [email or web address]       Legal Status:  Voluntary         Contacts (include TRAVIS status):              Upcoming Meetings and Dates/Important Information and next steps:

## 2025-06-02 NOTE — PLAN OF CARE
Rehab Group    Start time: 1015  End time: 1145  Patient time total: 90 minutes    attended full group    #7 attended   Group Type: OT Clinic   Group Topic Covered: balanced lifestyle, coping skills, healthy leisure time, mindfulness, and relaxation      Group Session Detail:    Intervention: Pt participated in a OT Clinic group to facilitate coping skills exploration and creative expression through personally meaningful activities, and to encourage utilization of these healthy coping skills to promote overall health and wellness. Group included clinical observation of social, cognitive and kinesthetic performance skills to inform treatment and safe discharge planning.    Cognition: Goal directed, Follows through with task          Mood/Affect: Pleasant     Plan: Patient encouraged to maintain attendance for continued ongoing support in working towards occupational therapy goals to support overall treatment/care.        Patient Detail:    Joined right at start of group with SIO staff present for the duration of patients time in group. Requested to begin working on the project she selected but did not begin working on in the previous group. Once setup with supplies, worked quietly on this for the full duration of group until writer stated it was time to clean up. Did not socialize with others during this time other than brief responses when writer initiated conversation. Did however demonstrate consistent focus and attention to project for full duration of group time.       [id number] OT Group (2 or more in attendance)    Patient Active Problem List   Diagnosis    Essential hypertension    CHRIS (obstructive sleep apnea)    Seasonal and perennial allergic rhinoconjunctivitis of right eye    Closed head injury    History of thyroid cancer    Adrenal mass, left    Class 3 severe obesity due to excess calories with serious comorbidity and body mass index (BMI) of 50.0 to 59.9 in adult (H)    Nonintractable epilepsy without  status epilepticus (H)    Esophageal reflux    Mixed hyperlipidemia    HLD (hyperlipidemia)    Hypothyroidism    Chronic obstructive pulmonary disease (H)    Schizoaffective disorder, depressive type (H)    DNR (do not resuscitate)    Migraine headache    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    Fracture of vertebra due to osteoporosis with routine healing, subsequent encounter    Major depressive disorder, recurrent episode, moderate (H)    Venous stasis    Peripheral polyneuropathy    Pulmonary embolism with infarction (H)    Idiopathic osteoporosis    Physical deconditioning    PTSD (post-traumatic stress disorder)    Long term (current) use of anticoagulants    History of melanoma    Seasonal affective disorder    Vision changes    Bilateral hand pain    Problem related to housing and economic circumstances    History of pulmonary embolism    Bilateral sensorineural hearing loss    Intertrigo    Dysfunction of both eustachian tubes    Chronic intractable pain    Strain of lumbar paraspinal muscle, initial encounter    Cervical stenosis of spinal canal    Cervical radiculopathy    History of colonic polyps    Hematoma of right lower leg    Nicotine use disorder    Right knee injury, sequela    Dysuria    History of stroke    Panic attack    Depression    Age-related osteoporosis without current pathological fracture    Stage 3b chronic kidney disease (H)

## 2025-06-02 NOTE — PROGRESS NOTES
Brief Medicine Note   2 June 2025       Paged by Pharmacy that INR < 2 x last 48hrs.  Start lovenox 0.75mg/kg BID for now to bridge until INR 2-3.       Katherine Wilson, CNP, APRN  Internal Medicine [facility] Hospitalist  [facility]  Available on [employer]

## 2025-06-02 NOTE — PLAN OF CARE
Goal Outcome Evaluation:    Problem: Sleep Disturbance  Goal: Adequate Sleep/Rest  Outcome: Progressing     Patient continues on SIO due to fall risk. Patient appeared to sleep for 7 hours. We'll continue to monitor.

## 2025-06-02 NOTE — PLAN OF CARE
"Nursing assessment completed. Patient continues on SIO. Patient laying in bed this morning, stating she is tired and requesting to sleep longer. She declines to eat breakfast stating she does not have an appetite. During morning medications pass, she tells writer that she would like to shower today and change her sheets, but she does not know what time she would like to do this. She is cooperative with scheduled am medications. Late morning, she does join community meeting in the lounge. Attends OT group. Writer changed bed linens. Patient joined group when the therapy dog came to the unit. Ate lunch in the lounge.  Patient showered independently with staff set up and stand by.  While resting in her room, patient began crying and requested and received PRN ativan at 1430. She stated that \"this always happens after I talk to the doctor\", but was unable to tell writer specifically what was upsetting or anxiety provoking about the conversation.         "

## 2025-06-03 ENCOUNTER — MEDICAL CORRESPONDENCE (OUTPATIENT)
Dept: HEALTH INFORMATION MANAGEMENT | Facility: CLINIC | Age: 65
End: 2025-06-03
Payer: COMMERCIAL

## 2025-06-03 LAB
INR PPP: 1.59 (ref 0.85–1.15)
PROTHROMBIN TIME: 19.4 SECONDS (ref 11.8–14.8)

## 2025-06-03 PROCEDURE — 36415 COLL VENOUS BLD VENIPUNCTURE: CPT

## 2025-06-03 PROCEDURE — 85610 PROTHROMBIN TIME: CPT

## 2025-06-03 PROCEDURE — 250N000012 HC RX MED GY IP 250 OP 636 PS 637: Performed by: PSYCHIATRY & NEUROLOGY

## 2025-06-03 PROCEDURE — 250N000013 HC RX MED GY IP 250 OP 250 PS 637: Performed by: PSYCHIATRY & NEUROLOGY

## 2025-06-03 PROCEDURE — 124N000002 HC R&B MH UMMC

## 2025-06-03 PROCEDURE — H2032 ACTIVITY THERAPY, PER 15 MIN: HCPCS

## 2025-06-03 PROCEDURE — 97150 GROUP THERAPEUTIC PROCEDURES: CPT | Mod: GO

## 2025-06-03 PROCEDURE — 250N000013 HC RX MED GY IP 250 OP 250 PS 637

## 2025-06-03 PROCEDURE — 250N000011 HC RX IP 250 OP 636: Performed by: PSYCHIATRY & NEUROLOGY

## 2025-06-03 PROCEDURE — 99232 SBSQ HOSP IP/OBS MODERATE 35: CPT | Performed by: PSYCHIATRY & NEUROLOGY

## 2025-06-03 RX ORDER — WARFARIN SODIUM 5 MG/1
10 TABLET ORAL
Status: COMPLETED | OUTPATIENT
Start: 2025-06-03 | End: 2025-06-03

## 2025-06-03 RX ORDER — BISACODYL 5 MG
5 TABLET, DELAYED RELEASE (ENTERIC COATED) ORAL DAILY PRN
Status: DISPENSED | OUTPATIENT
Start: 2025-06-03

## 2025-06-03 RX ADMIN — ROSUVASTATIN 20 MG: 20 TABLET, FILM COATED ORAL at 08:44

## 2025-06-03 RX ADMIN — BUDESONIDE AND FORMOTEROL FUMARATE DIHYDRATE 2 PUFF: 160; 4.5 AEROSOL RESPIRATORY (INHALATION) at 20:57

## 2025-06-03 RX ADMIN — FLUTICASONE PROPIONATE 1 SPRAY: 50 SPRAY, METERED NASAL at 08:45

## 2025-06-03 RX ADMIN — SERTRALINE HYDROCHLORIDE 25 MG: 25 TABLET ORAL at 08:44

## 2025-06-03 RX ADMIN — WARFARIN SODIUM 10 MG: 5 TABLET ORAL at 18:01

## 2025-06-03 RX ADMIN — ENOXAPARIN SODIUM 120 MG: 120 INJECTION SUBCUTANEOUS at 20:57

## 2025-06-03 RX ADMIN — DULOXETINE 120 MG: 30 CAPSULE, DELAYED RELEASE ORAL at 08:44

## 2025-06-03 RX ADMIN — BUDESONIDE AND FORMOTEROL FUMARATE DIHYDRATE 2 PUFF: 160; 4.5 AEROSOL RESPIRATORY (INHALATION) at 08:45

## 2025-06-03 RX ADMIN — PREGABALIN 25 MG: 25 CAPSULE ORAL at 08:44

## 2025-06-03 RX ADMIN — FLUTICASONE PROPIONATE 1 SPRAY: 50 SPRAY, METERED NASAL at 00:12

## 2025-06-03 RX ADMIN — TRAZODONE HYDROCHLORIDE 50 MG: 50 TABLET ORAL at 20:56

## 2025-06-03 RX ADMIN — PANTOPRAZOLE SODIUM 40 MG: 40 TABLET, DELAYED RELEASE ORAL at 08:44

## 2025-06-03 RX ADMIN — SENNOSIDES 2 TABLET: 8.6 TABLET ORAL at 08:44

## 2025-06-03 RX ADMIN — LEVOTHYROXINE SODIUM 300 MCG: 0.15 TABLET ORAL at 08:44

## 2025-06-03 RX ADMIN — CYANOCOBALAMIN TAB 1000 MCG 1000 MCG: 1000 TAB at 08:43

## 2025-06-03 RX ADMIN — LORAZEPAM 0.5 MG: 0.5 TABLET ORAL at 00:13

## 2025-06-03 RX ADMIN — BRIVARACETAM 150 MG: 50 TABLET, FILM COATED ORAL at 20:56

## 2025-06-03 RX ADMIN — PREDNISONE 5 MG: 5 TABLET ORAL at 08:44

## 2025-06-03 RX ADMIN — BENZTROPINE MESYLATE 0.5 MG: 0.5 TABLET ORAL at 20:56

## 2025-06-03 RX ADMIN — PREGABALIN 25 MG: 25 CAPSULE ORAL at 20:56

## 2025-06-03 RX ADMIN — FLUTICASONE PROPIONATE 1 SPRAY: 50 SPRAY, METERED NASAL at 20:57

## 2025-06-03 RX ADMIN — Medication 150 MG: at 20:56

## 2025-06-03 RX ADMIN — CARBAMAZEPINE 200 MG: 200 TABLET ORAL at 08:46

## 2025-06-03 RX ADMIN — BENZTROPINE MESYLATE 0.5 MG: 0.5 TABLET ORAL at 08:45

## 2025-06-03 RX ADMIN — ENOXAPARIN SODIUM 120 MG: 120 INJECTION SUBCUTANEOUS at 08:45

## 2025-06-03 RX ADMIN — SENNOSIDES 2 TABLET: 8.6 TABLET ORAL at 20:56

## 2025-06-03 RX ADMIN — CARBAMAZEPINE 400 MG: 200 TABLET ORAL at 13:13

## 2025-06-03 RX ADMIN — CARBAMAZEPINE 200 MG: 200 TABLET ORAL at 20:56

## 2025-06-03 RX ADMIN — ARIPIPRAZOLE 20 MG: 10 TABLET ORAL at 08:44

## 2025-06-03 RX ADMIN — BRIVARACETAM 150 MG: 50 TABLET, FILM COATED ORAL at 08:43

## 2025-06-03 RX ADMIN — Medication 100 MCG: at 08:43

## 2025-06-03 RX ADMIN — PRAZOSIN HYDROCHLORIDE 2 MG: 2 CAPSULE ORAL at 20:56

## 2025-06-03 ASSESSMENT — ACTIVITIES OF DAILY LIVING (ADL)
ADLS_ACUITY_SCORE: 94
ADLS_ACUITY_SCORE: 94
ADLS_ACUITY_SCORE: 104
LAUNDRY: UNABLE TO COMPLETE
ORAL_HYGIENE: INDEPENDENT
DRESS: PROMPTS;INDEPENDENT
ADLS_ACUITY_SCORE: 104
ADLS_ACUITY_SCORE: 94
ADLS_ACUITY_SCORE: 94
ADLS_ACUITY_SCORE: 104
HYGIENE/GROOMING: PROMPTS;INDEPENDENT
ADLS_ACUITY_SCORE: 94
ADLS_ACUITY_SCORE: 104
ADLS_ACUITY_SCORE: 94
ADLS_ACUITY_SCORE: 104
LAUNDRY: UNABLE TO COMPLETE
ADLS_ACUITY_SCORE: 104
ADLS_ACUITY_SCORE: 94
ORAL_HYGIENE: PROMPTS;INDEPENDENT
HYGIENE/GROOMING: INDEPENDENT;WITH ASSISTANCE
ADLS_ACUITY_SCORE: 94
ADLS_ACUITY_SCORE: 104
DRESS: INDEPENDENT
ADLS_ACUITY_SCORE: 94
ADLS_ACUITY_SCORE: 104
ADLS_ACUITY_SCORE: 94

## 2025-06-03 NOTE — PLAN OF CARE
BEH IP Unit Acuity Rating Score (UARS)  Patient is given one point for every criteria they meet.    CRITERIA SCORING   On a 72 hour hold, court hold, committed, stay of commitment, or revocation. 0    Patient LOS on BEH unit exceeds 20 days. 0  LOS: 6   Patient under guardianship, 55+, otherwise medically complex, or under age 11. 1   Suicide ideation without relief of precipitating factors. 1   Current plan for suicide. 0   Current plan for homicide. 0   Imminent risk or actual attempt to seriously harm another without relief of factors precipitating the attempt. 0   Severe dysfunction in daily living (ex: complete neglect for self care, extreme disruption in vegetative function, extreme deterioration in social interactions). 1   Recent (last 7 days) or current physical aggression in the ED or on unit. 0   Restraints or seclusion episode in past 72 hours. 0   Recent (last 7 days) or current verbal aggression, agitation, yelling, etc., while in the ED or unit. 0   Active psychosis. 0   Need for constant or near constant redirection (from leaving, from others, etc).  0   Intrusive or disruptive behaviors. 0   Patient requires 3 or more hours of individualized nursing care per 8-hour shift (i.e. for ADLs, meds, therapeutic interventions). 1   TOTAL 3

## 2025-06-03 NOTE — PROGRESS NOTES
Care Coordinator Note(s):    Care Request(s):   Check Availability  Preferences:  Notes: Please find [facility] and see who the psychiatrist is and if she has an appointment. Unclear when pt is leaving so don't want appointment yet. I jsut need the info on her psychiatry at this point. [facility]/[employer] Medicare ADV plus medical assistance    Check Availability  Preferences:  Notes: See if there is therapy at her psychiatry clinic - [facility]. Se if there is therapy services at her psychiatry clinic as above.       Care Outcome(s): CC spoke to intake at [facility]. Confirmed patient has psychiatrist there but does not have any upcoming scheduled appointments. Patient has historically been seen in person at the [place] location for psychiatry. [facility] requested TRAVIS to tell us the name of the provider (and schedule in the future if needed).    [facility] does offer individual therapy as well. They currently have openings with male providers only. If patient is ok with male provider and would like therapy in the future, we can fax TRAVIS and schedule at that time.    Psychiatry:  [facility] - [phone]  [address] [place] [id number]  Fax: [phone]      [facility] Updated      Donna Garrison  Adult Behavioral Health Care Coordinator

## 2025-06-03 NOTE — PROGRESS NOTES
Rehab Group    Start time: 1315  End time: 1415  Patient time total: 45 minutes    attended full group     #3 attended   Group Type: art   Group Topic Covered: activity therapy       Group Session Detail:  Art Therapy directive was to create positive affirmation cards using mixed media art materials.  Goals of directive: emotional expression, negative thought stopping, emotional regulation, media exploration.     Patient Response/Contribution:  cooperative with task       Patient Detail:    Pt was a quiet, engaged participant, focused on task for the majority of group. Pt created a positive affirmation card and briefly shared with group.  Pts mood was calm.      Activity Therapy Per 15 min ()      Patient Active Problem List   Diagnosis    Essential hypertension    CHRIS (obstructive sleep apnea)    Seasonal and perennial allergic rhinoconjunctivitis of right eye    Closed head injury    History of thyroid cancer    Adrenal mass, left    Class 3 severe obesity due to excess calories with serious comorbidity and body mass index (BMI) of 50.0 to 59.9 in adult (H)    Nonintractable epilepsy without status epilepticus (H)    Esophageal reflux    Mixed hyperlipidemia    HLD (hyperlipidemia)    Hypothyroidism    Chronic obstructive pulmonary disease (H)    Schizoaffective disorder, depressive type (H)    DNR (do not resuscitate)    Migraine headache    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    Fracture of vertebra due to osteoporosis with routine healing, subsequent encounter    Major depressive disorder, recurrent episode, moderate (H)    Venous stasis    Peripheral polyneuropathy    Pulmonary embolism with infarction (H)    Idiopathic osteoporosis    Physical deconditioning    PTSD (post-traumatic stress disorder)    Long term (current) use of anticoagulants    History of melanoma    Seasonal affective disorder    Vision changes    Bilateral hand pain    Problem related to housing and economic circumstances    History of  pulmonary embolism    Bilateral sensorineural hearing loss    Intertrigo    Dysfunction of both eustachian tubes    Chronic intractable pain    Strain of lumbar paraspinal muscle, initial encounter    Cervical stenosis of spinal canal    Cervical radiculopathy    History of colonic polyps    Hematoma of right lower leg    Nicotine use disorder    Right knee injury, sequela    Dysuria    History of stroke    Panic attack    Depression    Age-related osteoporosis without current pathological fracture    Stage 3b chronic kidney disease (H)

## 2025-06-03 NOTE — PLAN OF CARE
"  Rehab Group    Start time: 14:00  End time: 14:45  Patient time total: 45 minutes    attended full group    #4 attended   Group Type: occupational therapy   Group Topic Covered: cognitive activities, coping skills, healthy leisure time, and social skills     Group Session Detail:  Pt actively participated in a structured occupational therapy group with a focus on facilitating social and leisure engagement via an interactive activity (Skip-Paulo). This occupational therapy group was facilitated in order to: foster relaxation, explore leisure opportunities, cope with stress, decrease isolation/improve social skills, and exercise overall cognitive skills.      Patient Response/Contribution:  cooperative with task, organized, supportive of peers, socially appropriate, listened actively, attentive, and actively engaged     Patient Detail:  Patient was accompanied to group by SIO staff. Patient reported familiarity with task with good recall and understanding observed. Patient assisted with explaining the rules of the activity to other's less familiar. Patient remained engaged, attentive, and cooperative throughout duration of group. Able to attend to task successfully and engage independently. Patient reported, \"That was fun\" at the conclusion of group. Affect brightened slightly with interactions and engagement.       [id number] OT Group (2 or more in attendance)      Patient Active Problem List   Diagnosis    Essential hypertension    CHRIS (obstructive sleep apnea)    Seasonal and perennial allergic rhinoconjunctivitis of right eye    Closed head injury    History of thyroid cancer    Adrenal mass, left    Class 3 severe obesity due to excess calories with serious comorbidity and body mass index (BMI) of 50.0 to 59.9 in adult (H)    Nonintractable epilepsy without status epilepticus (H)    Esophageal reflux    Mixed hyperlipidemia    HLD (hyperlipidemia)    Hypothyroidism    Chronic obstructive pulmonary disease (H)    " Schizoaffective disorder, depressive type (H)    DNR (do not resuscitate)    Migraine headache    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    Fracture of vertebra due to osteoporosis with routine healing, subsequent encounter    Major depressive disorder, recurrent episode, moderate (H)    Venous stasis    Peripheral polyneuropathy    Pulmonary embolism with infarction (H)    Idiopathic osteoporosis    Physical deconditioning    PTSD (post-traumatic stress disorder)    Long term (current) use of anticoagulants    History of melanoma    Seasonal affective disorder    Vision changes    Bilateral hand pain    Problem related to housing and economic circumstances    History of pulmonary embolism    Bilateral sensorineural hearing loss    Intertrigo    Dysfunction of both eustachian tubes    Chronic intractable pain    Strain of lumbar paraspinal muscle, initial encounter    Cervical stenosis of spinal canal    Cervical radiculopathy    History of colonic polyps    Hematoma of right lower leg    Nicotine use disorder    Right knee injury, sequela    Dysuria    History of stroke    Panic attack    Depression    Age-related osteoporosis without current pathological fracture    Stage 3b chronic kidney disease (H)

## 2025-06-03 NOTE — PLAN OF CARE
"  Problem: Adult Behavioral Health Plan of Care  Goal: Plan of Care Review  Recent Flowsheet Documentation  Taken 6/3/2025 1300 by Olga Bacon RN  Patient Agreement with Plan of Care: agrees   Goal Outcome Evaluation:    Plan of Care Reviewed With: patient              Presentation: The patient is approached for am medications.  The patient is resting in bed.  The patient declined breakfast and assessment.  The patient stated, \"I don't want to get up until 1000 am.  The patient has a flat and blunted affect. Patient has a slow speech pattern, yet is clear.  The patients mood is isolated to self.  Thoughts are distracted and preoccupied.  The patient did not shower. She attended community meeting and remained in the milieu the rest of the shift.        Mental health symptoms: The patient denies SI,SIB, HI and hallucinations.  Rates depression 6/10 and anxiety 8/10.  It is notified that the patients left hand rises to the patients face and tremors when the patient is asked too many questions.  The patient verbalizes that she gets anxious when she is asked too many questions.        Medication compliance: The patient is compliant with all medications.          Medical Concerns: The patient c/o constipation.  Per patient she spoke with the provider her concerns.  New order is received.  Please refer to the EMAR for details. The patient c/o back pain 6/10.  Declined PRN.      PRN's: none      Precautions: Suicide, fall, and seizure     Continue with plan of care                "

## 2025-06-03 NOTE — PLAN OF CARE
Rehab Group    Start time: 10:20  End time: 11:10  Patient time total: 45 minutes    attended partial group    #5 attended   Group Type: OT Clinic   Group Topic Covered: balanced lifestyle, cognitive activities, coping skills, emotional regulation, healthy leisure time, mindfulness, relaxation , and social skills     Group Session Detail:  Pt actively participated in occupational therapy clinic to facilitate coping skill exploration, creative expression within personally meaningful activities, and clinical observation of social, cognitive, and kinesthetic performance skills.      Patient Response/Contribution:  cooperative with task, attentive, actively engaged, and engaged socially when prompted     Pt response: set-up assist to initiate, gather materials, sequence, and adjust to workspace demands as needed. Demonstrated good focus, planning, and problem solving for selected window cling task. Patient remained a quiet participant; fully attending to and engaged with task. No social interaction with peers observed. Patient worked in a neat, organized, and intentional manner until reaching task completion. Able to ask for assistance and make needs known appropriately. Patient was accompanied to group by SIO staff.      [id number] OT Group (2 or more in attendance)      Patient Active Problem List   Diagnosis    Essential hypertension    CHRIS (obstructive sleep apnea)    Seasonal and perennial allergic rhinoconjunctivitis of right eye    Closed head injury    History of thyroid cancer    Adrenal mass, left    Class 3 severe obesity due to excess calories with serious comorbidity and body mass index (BMI) of 50.0 to 59.9 in adult (H)    Nonintractable epilepsy without status epilepticus (H)    Esophageal reflux    Mixed hyperlipidemia    HLD (hyperlipidemia)    Hypothyroidism    Chronic obstructive pulmonary disease (H)    Schizoaffective disorder, depressive type (H)    DNR (do not resuscitate)    Migraine headache     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    Fracture of vertebra due to osteoporosis with routine healing, subsequent encounter    Major depressive disorder, recurrent episode, moderate (H)    Venous stasis    Peripheral polyneuropathy    Pulmonary embolism with infarction (H)    Idiopathic osteoporosis    Physical deconditioning    PTSD (post-traumatic stress disorder)    Long term (current) use of anticoagulants    History of melanoma    Seasonal affective disorder    Vision changes    Bilateral hand pain    Problem related to housing and economic circumstances    History of pulmonary embolism    Bilateral sensorineural hearing loss    Intertrigo    Dysfunction of both eustachian tubes    Chronic intractable pain    Strain of lumbar paraspinal muscle, initial encounter    Cervical stenosis of spinal canal    Cervical radiculopathy    History of colonic polyps    Hematoma of right lower leg    Nicotine use disorder    Right knee injury, sequela    Dysuria    History of stroke    Panic attack    Depression    Age-related osteoporosis without current pathological fracture    Stage 3b chronic kidney disease (H)

## 2025-06-03 NOTE — PLAN OF CARE
Patient has spent majority of the shift in the milieu. Worked on putting together a puzzle. Reports having anxiety and depression. Denies auditory and visual hallucinations. Denies suicidal ideation and self injurious thoughts. Med compliant. Affect is flat and guarded. Ate dinner and snack.  visited. Talked about possibly going to an assisted living facility from here.     Patient evaluation continues. Assessed mood,anxiety,thoughts and behavior.     Patient gradually progressing towards goals.    Patient is encouraged to participate in groups and assisted to develop healthy coping skills.     VS reviewed: /65 (BP Location: Right arm, Patient Position: Left side)   Pulse 75   Temp 98.4  F (36.9  C) (Oral)   Resp 16   Wt (!) 152.3 kg (335 lb 11.2 oz)   LMP  (LMP Unknown)   SpO2 93%   BMI 54.18 kg/m      Length of stay: 5    Refer to daily team meeting notes for individualized plan of care. Nursing will continue to assess.

## 2025-06-03 NOTE — PLAN OF CARE
"    Team Note Due:  Thursday    Assessment/Intervention/Current Symtoms and Care Coordination:  Chart review and met with team, discussed pt progress, symptomology, and response to treatment.  Discussed the discharge plan and any potential impediments to discharge.    Team Meeting  Pt wants to go to Assited Living but can work on that with her CADI .      CADI CM called and spoke with the patient.  I messaged him to see how the conversation went.  I asked him how he can help with assisted living.  He sent this message about their conversation.  Our conversation went well.  Valentina indicated that she is  seriously considering  moving to an assisted living facility.  She stated that she will only move with her  who also has waiver services so this will need to be coordinated with her 's waiver  since they are on different waivers.    I agreed to begin looking for assisted living facilities in [facility] with vacancies that may work for she and her .  Valentina refused to consider moving to a different County so our options will be somewhat limited.  If you have any resources or information about potential assisted living facilities, I would appreciate you passing those along.       I met with the pt.  She signed an TRAVIS for .  She said she was feeling better today.  She said \"the doctor wants me to go to assisted living.\" I explained that my plan was to  have her go home and work with her CADI CM. She said her  hasn't wanted to but now he will.  is on an EW. I said that she and her  should tour these places to see where they want to lve. She said that if she went home she might not want to go to assisted living.        I have asked CCs to check therapy and psychiatry services.      Discharge Plan or Goal:  Home with services  She can talk with her CADI CM about assisted living.         Barriers to Discharge:  Pt may want to extend her stay  Team is " giving pt different messages about the plan     Referral Status:    Insurance Coverage for SErvices  [employer] Medicare Advantage with [place]    Psychiatry - CCs checking on where this is and if the  patient has an appointment  [facility]       Therapy - CCs checking to see if psychiatry clinic has therapy services  Pt does want therapy    Home Health  Herlinda from [facility] @ [phone] called and said They have terminated the patient.  [facility] has agreed to take the patient.      Behavioral Health/CADI :   Jose Morfin,  II  Behavioral Health Waiver   [facility]  [address] [address] [place] [address]  [address]  Cell:  [phone]  Fax:  [phone]  [email or web address]  Services Authorized:  Home Delivered Meals  Skilled Nursing 1x week - [facility] is will to see pt on Mondays, pt has to confirm this is okay  Homemaker with assit with personal care  [employer] - Individual Home Supports  [facility] - Personal Emergency Response        Legal Status:  Voluntary         Contacts (include TRAVIS status):      Pt signed TRAVIS for   VALENTIN WEST Spouse   [phone]           Upcoming Meetings and Dates/Important Information and next steps:

## 2025-06-03 NOTE — PROGRESS NOTES
[facility], [place]   Psychiatric Progress Note        Interim History:   The patient's care was discussed with the treatment team during the daily team meeting and/or staff's chart notes were reviewed.  Staff report patient slept 6 hours. Reported that patient spent evening in the milieu doing a puzzle, otherwise spent large majority of her time in bed. Compliant with medications, allows for labs and vitals to be taken, but remains visibly anxious and depressed. Required Ativan 0.5 mg x1 for anxiety this AM.    Met with patient: patient was seen in the conference room in the presence of a student. Was wheeled in via wheelchair and accompanied by the psych associate. Reports that she is doing better than yesterday. Says she has been attending group activities which she has been enjoying.  She feels less labile, stating she is not crying as easily today. Started Zoloft yesterday, says that maybe it is the medication working even though we discussed the effects likely would not be working this immediately. Also started Cogentin yesterday, has not noticed any improvements but denies side effects. Met with  yesterday evening which she reports went well. Reports they discussed assisted living which she would be open to, but is hesitant considering the price and living with her  and cat. Lastly, patient had reported constipation and would like options to help alleviate her symptoms. Has previously tried Senna and Miralax without relief.     Throughout visit, did not cry or become tearful which could be considered improvement. Continues to require 1:1. Continues to gradually progress towards goals.          Medications:     Current Facility-Administered Medications   Medication Dose Route Frequency Provider Last Rate Last Admin    ARIPiprazole (ABILIFY) tablet 20 mg  20 mg Oral Benjamin Booker MD   20 mg at 06/03/25 0844    benztropine (COGENTIN) tablet 0.5 mg  0.5 mg  Oral BID Benjamin Patterson MD   0.5 mg at 06/03/25 0845    Brivaracetam (BRIVIACT) tablet 150 mg  150 mg Oral BID Benjamin Patterson MD   150 mg at 06/03/25 0843    budesonide-formoterol (SYMBICORT/BREYNA) 160-4.5 MCG/ACT inhaler 2 puff  2 puff Inhalation BID Benjamin Patterson MD   2 puff at 06/03/25 0845    carBAMazepine (TEGretol) tablet 200 mg  200 mg Oral BID Benjamin Patterson MD   200 mg at 06/03/25 0846    carBAMazepine (TEGretol) tablet 400 mg  400 mg Oral Daily Jim Beasley PA-C   400 mg at 06/02/25 1237    cyanocobalamin (VITAMIN B-12) tablet 1,000 mcg  1,000 mcg Oral Daily Benjamin Patterson MD   1,000 mcg at 06/03/25 0843    DULoxetine (CYMBALTA) DR capsule 120 mg  120 mg Oral Daily Benjamin Patterson MD   120 mg at 06/03/25 0844    enoxaparin ANTICOAGULANT (LOVENOX) injection 120 mg  0.75 mg/kg Subcutaneous Q12H Benjamin Patterson MD   120 mg at 06/03/25 0845    fluticasone (FLONASE) 50 MCG/ACT spray 1 spray  1 spray Both Nostrils BID Benjamin Patterson MD   1 spray at 06/03/25 0845    levothyroxine (SYNTHROID/LEVOTHROID) tablet 300 mcg  300 mcg Oral QAM AC Benjamin Patterson MD   300 mcg at 06/03/25 0844    pantoprazole (PROTONIX) EC tablet 40 mg  40 mg Oral QAM Benjamin Gaxiola MD   40 mg at 06/03/25 0844    prazosin (MINIPRESS) capsule 2 mg  2 mg Oral At Bedtime Benjamin Patterson MD   2 mg at 06/02/25 2226    predniSONE (DELTASONE) tablet 5 mg  5 mg Oral Daily Benjamin Patterson MD   5 mg at 06/03/25 0844    pregabalin (LYRICA) capsule 25 mg  25 mg Oral BID Benjamin Patterson MD   25 mg at 06/03/25 0844    QUEtiapine (SEROquel) half-tab 150 mg  150 mg Oral At Bedtime Benjamin Patterson MD   150 mg at 06/02/25 2225    rosuvastatin (CRESTOR) tablet 20 mg  20 mg Oral Daily Benjamin Patterson MD   20 mg at 06/03/25 0844    sennosides (SENOKOT) tablet 2 tablet  2 tablet Oral BID Benjamin Patterson MD   2 tablet at 06/03/25 0844     sertraline (ZOLOFT) tablet 25 mg  25 mg Oral Daily Benjamin Patterson MD   25 mg at 06/03/25 0844    Vitamin D3 (CHOLECALCIFEROL) tablet 100 mcg  100 mcg Oral Daily Benjamin Patterson MD   100 mcg at 06/03/25 0843    warfarin ANTICOAGULANT (COUMADIN/JANTOVEN) tablet 10 mg  10 mg Oral ONCE at 18:00 Benjamin Patterson MD        Warfarin Dose Required Daily - Pharmacist Managed  1 each Oral See Admin Instructions Jim Beasley PA-C              Allergies:     Allergies   Allergen Reactions    Aspirin Shortness Of Breath    Bees Anaphylaxis    Lamotrigine Dizziness and Unknown    Latex Itching    Phenobarbital      aggression    Vistaril [Hydroxyzine] Other (See Comments)     Sluggish, unable to wake up    Gabapentin Rash          Labs:     Recent Results (from the past 24 hours)   INR    Collection Time: 06/03/25  8:18 AM   Result Value Ref Range    INR 1.59 (H) 0.85 - 1.15    PT 19.4 (H) 11.8 - 14.8 Seconds          Psychiatric Examination:     /75   Pulse 58   Temp 98.4  F (36.9  C) (Oral)   Resp 16   Wt (!) 152.3 kg (335 lb 11.2 oz)   LMP  (LMP Unknown)   SpO2 94%   BMI 54.18 kg/m    Weight is 335 lbs 11.2 oz  Body mass index is 54.18 kg/m .    Orthostatic Vitals       None            Appearance: awake, alert, adequately groomed, dressed in hospital scrubs, and moderately obese  Attitude:  somewhat cooperative  Eye Contact:  good  Mood:  anxious and depressed, but better  Affect:  intensity is dramatic, less labile, not tearful  Speech:  clear, coherent  Psychomotor Behavior:  intermittent tremor or right hand  Throught Process: more linear and goal oriented  Associations:  no loose associations  Thought Content:  no evidence of suicidal ideation or homicidal ideation, no obvious hallucinations   Insight:  partial  Judgement:  limited  Oriented to:  time, person, and place  Attention Span and Concentration:  fair  Recent and Remote Memory:  fair    Clinical Global Impressions  First: 6/4  6/3/2025      Most recent:            Precautions:     Behavioral Orders   Procedures    Code 1 - Restrict to Unit    Fall precautions    Routine Programming     As clinically indicated    Seizure precautions    Status 15     Every 15 minutes.    Status Individual Observation     Patient SIO status reviewed with team/RN.  Please also refer to RN/team documentation for add'l detail.    -SIO staff to monitor following which have contributed to patient being on SIO:  Fall risk  -Possible interventions SIO staff could use to support patient's treatment progress:  Staying close to patient, helping with transfers and during ambulation  -When following observed, team will review discontinuation of SIO:  Will stop SIO when is more steady on feet, decreased risk of falls.     CONTINUOUS 24 hours / day:   5 feet     Indications for SIO:   Self-injury risk    Suicide precautions: Suicide Risk: MODERATE; Clinical rationale to override score: response to medication     Patients on Suicide Precautions should have a Combination Diet ordered that includes a Diet selection(s) AND a Behavioral Tray selection for Safe Tray - with utensils, or Safe Tray - NO utensils       Suicide Risk:   MODERATE     Clinical rationale to override score::   response to medication          DIagnoses:     Unspecified anxiety  Unspecified Depressive disorder with SI, rule out Major depressive disorder, recurrent, severe;  Borderline personality DO, possible, histrionic PD is suspected as well.         Plan:     Overall patient is slowly improving and progressing towards goal. Biggest barrier is emotional lability and patient's strong desire to stay. Will continue inpatient stabilization.    - Start Bisacodyl 1 tab for constipation   - Continue Zoloft and Cogentin. Monitor for improvement and side effects.    - Work on discharge planning. At this time, best and most realistic option is likely home health care consisting on visits every Monday.        I  was present with PA student who participated in the service and in the documentation of the note. I have verified the history and personally performed the physical exam and medical decision making. I agree with the assessment and plan of care as documented in the note.     Benjamin Patterson MD  [facility] Psychiatry    Total time spent was 37 minutes. Over 50% of times was spent counseling and coordination of care regarding coping skills, medication and discharge planning.

## 2025-06-03 NOTE — PLAN OF CARE
Problem: Sleep Disturbance  Goal: Adequate Sleep/Rest  Outcome: Progressing   Goal Outcome Evaluation:           NOC Shift Report   (6/2/25 into 06/03/25)    Pt in bed at beginning of shift, but awake. Pt slept through shift. Pt slept 6 hours. Pt complained of stuffy nose and requested scheduled Flonase spray which was administered late due to missing stock from pharmacy as well as Ativan 0.5 mg PRN for anxiety. Will continue to monitor via Q15 minute safety checks. Will continue to monitor and assess.

## 2025-06-04 LAB
INR PPP: 1.67 (ref 0.85–1.15)
PROTHROMBIN TIME: 20.1 SECONDS (ref 11.8–14.8)

## 2025-06-04 PROCEDURE — 250N000012 HC RX MED GY IP 250 OP 636 PS 637: Performed by: PSYCHIATRY & NEUROLOGY

## 2025-06-04 PROCEDURE — H2032 ACTIVITY THERAPY, PER 15 MIN: HCPCS

## 2025-06-04 PROCEDURE — 124N000002 HC R&B MH UMMC

## 2025-06-04 PROCEDURE — 250N000013 HC RX MED GY IP 250 OP 250 PS 637

## 2025-06-04 PROCEDURE — 99232 SBSQ HOSP IP/OBS MODERATE 35: CPT | Performed by: PSYCHIATRY & NEUROLOGY

## 2025-06-04 PROCEDURE — 36415 COLL VENOUS BLD VENIPUNCTURE: CPT

## 2025-06-04 PROCEDURE — 250N000013 HC RX MED GY IP 250 OP 250 PS 637: Performed by: PSYCHIATRY & NEUROLOGY

## 2025-06-04 PROCEDURE — 97150 GROUP THERAPEUTIC PROCEDURES: CPT | Mod: GO

## 2025-06-04 PROCEDURE — 85610 PROTHROMBIN TIME: CPT

## 2025-06-04 PROCEDURE — 250N000011 HC RX IP 250 OP 636: Performed by: PSYCHIATRY & NEUROLOGY

## 2025-06-04 PROCEDURE — 90853 GROUP PSYCHOTHERAPY: CPT

## 2025-06-04 RX ORDER — SERTRALINE HYDROCHLORIDE 25 MG/1
50 TABLET, FILM COATED ORAL DAILY
Status: DISPENSED | OUTPATIENT
Start: 2025-06-05

## 2025-06-04 RX ADMIN — BUDESONIDE AND FORMOTEROL FUMARATE DIHYDRATE 2 PUFF: 160; 4.5 AEROSOL RESPIRATORY (INHALATION) at 08:03

## 2025-06-04 RX ADMIN — CARBAMAZEPINE 200 MG: 200 TABLET ORAL at 08:01

## 2025-06-04 RX ADMIN — BENZTROPINE MESYLATE 0.5 MG: 0.5 TABLET ORAL at 08:02

## 2025-06-04 RX ADMIN — TRAZODONE HYDROCHLORIDE 50 MG: 50 TABLET ORAL at 22:38

## 2025-06-04 RX ADMIN — PRAZOSIN HYDROCHLORIDE 2 MG: 2 CAPSULE ORAL at 20:42

## 2025-06-04 RX ADMIN — BUDESONIDE AND FORMOTEROL FUMARATE DIHYDRATE 2 PUFF: 160; 4.5 AEROSOL RESPIRATORY (INHALATION) at 20:51

## 2025-06-04 RX ADMIN — PANTOPRAZOLE SODIUM 40 MG: 40 TABLET, DELAYED RELEASE ORAL at 08:02

## 2025-06-04 RX ADMIN — PREDNISONE 5 MG: 5 TABLET ORAL at 08:02

## 2025-06-04 RX ADMIN — FLUTICASONE PROPIONATE 1 SPRAY: 50 SPRAY, METERED NASAL at 20:51

## 2025-06-04 RX ADMIN — PREGABALIN 25 MG: 25 CAPSULE ORAL at 20:42

## 2025-06-04 RX ADMIN — FLUTICASONE PROPIONATE 1 SPRAY: 50 SPRAY, METERED NASAL at 08:03

## 2025-06-04 RX ADMIN — PREGABALIN 25 MG: 25 CAPSULE ORAL at 08:01

## 2025-06-04 RX ADMIN — BRIVARACETAM 150 MG: 50 TABLET, FILM COATED ORAL at 20:42

## 2025-06-04 RX ADMIN — SERTRALINE HYDROCHLORIDE 25 MG: 25 TABLET ORAL at 08:02

## 2025-06-04 RX ADMIN — TRAZODONE HYDROCHLORIDE 50 MG: 50 TABLET ORAL at 02:11

## 2025-06-04 RX ADMIN — CARBAMAZEPINE 200 MG: 200 TABLET ORAL at 20:42

## 2025-06-04 RX ADMIN — SENNOSIDES 2 TABLET: 8.6 TABLET ORAL at 20:42

## 2025-06-04 RX ADMIN — BRIVARACETAM 150 MG: 50 TABLET, FILM COATED ORAL at 07:59

## 2025-06-04 RX ADMIN — SENNOSIDES 2 TABLET: 8.6 TABLET ORAL at 08:01

## 2025-06-04 RX ADMIN — Medication 100 MCG: at 08:00

## 2025-06-04 RX ADMIN — Medication 150 MG: at 20:42

## 2025-06-04 RX ADMIN — LEVOTHYROXINE SODIUM 300 MCG: 0.15 TABLET ORAL at 08:00

## 2025-06-04 RX ADMIN — DULOXETINE 120 MG: 30 CAPSULE, DELAYED RELEASE ORAL at 08:00

## 2025-06-04 RX ADMIN — ROSUVASTATIN 20 MG: 20 TABLET, FILM COATED ORAL at 08:02

## 2025-06-04 RX ADMIN — CARBAMAZEPINE 400 MG: 200 TABLET ORAL at 11:20

## 2025-06-04 RX ADMIN — CYANOCOBALAMIN TAB 1000 MCG 1000 MCG: 1000 TAB at 08:01

## 2025-06-04 RX ADMIN — BENZTROPINE MESYLATE 0.5 MG: 0.5 TABLET ORAL at 20:42

## 2025-06-04 RX ADMIN — WARFARIN SODIUM 12.5 MG: 10 TABLET ORAL at 18:14

## 2025-06-04 RX ADMIN — ARIPIPRAZOLE 20 MG: 10 TABLET ORAL at 08:01

## 2025-06-04 RX ADMIN — ENOXAPARIN SODIUM 120 MG: 120 INJECTION SUBCUTANEOUS at 08:03

## 2025-06-04 RX ADMIN — ENOXAPARIN SODIUM 120 MG: 120 INJECTION SUBCUTANEOUS at 20:42

## 2025-06-04 RX ADMIN — LORAZEPAM 0.5 MG: 0.5 TABLET ORAL at 16:26

## 2025-06-04 ASSESSMENT — ACTIVITIES OF DAILY LIVING (ADL)
ADLS_ACUITY_SCORE: 94
ADLS_ACUITY_SCORE: 98
ADLS_ACUITY_SCORE: 94
ADLS_ACUITY_SCORE: 98
ADLS_ACUITY_SCORE: 98
ADLS_ACUITY_SCORE: 94
ADLS_ACUITY_SCORE: 88
ADLS_ACUITY_SCORE: 88
ORAL_HYGIENE: PROMPTS;INDEPENDENT
ADLS_ACUITY_SCORE: 94
ADLS_ACUITY_SCORE: 88
ADLS_ACUITY_SCORE: 94
ADLS_ACUITY_SCORE: 94
ORAL_HYGIENE: PROMPTS;INDEPENDENT
ADLS_ACUITY_SCORE: 94
ADLS_ACUITY_SCORE: 94
DRESS: PROMPTS
ADLS_ACUITY_SCORE: 88
ADLS_ACUITY_SCORE: 88
HYGIENE/GROOMING: PROMPTS;INDEPENDENT
HYGIENE/GROOMING: PROMPTS;INDEPENDENT
ADLS_ACUITY_SCORE: 88
ADLS_ACUITY_SCORE: 98
ADLS_ACUITY_SCORE: 88
ADLS_ACUITY_SCORE: 88
ADLS_ACUITY_SCORE: 98
LAUNDRY: WITH SUPERVISION
ADLS_ACUITY_SCORE: 98
ADLS_ACUITY_SCORE: 98
DRESS: SCRUBS (BEHAVIORAL HEALTH)

## 2025-06-04 NOTE — PROGRESS NOTES
"[facility], [place]   Psychiatric Progress Note        Interim History:   The patient's care was discussed with the treatment team during the daily team meeting and/or staff's chart notes were reviewed.  Staff report patient 6.5 hours last night with administration of PRN 50mg trazodone per patient request. She had a rather uneventful evening, spending lots of time in the milieu attending group and working on puzzles. She did refuse breakfast and AM assessment today, but was reported to have, overall, a good appetite. She remains med compliant and cooperative, however spends lots of time in bed with a flat affect. She continues to be on SIO because of risk of falls. Per IM note, they put patient on Lovenox as a bridge until INR is between 2 and 3.    Met with patient: Met patient in the presence of PA student in patient's room. Valentina was laying in bed with the lights off past 10 am saying she felt tired. Much less talkative today, although maybe expected as she feels fatigued. Says her mood is \"ok\", denies SI/HI/SIB or hallucinations. Does not recall group or activities last night, slightly remembers when prompted. No complaints or concerns about her new medication, is agreeable in increasing the Zoloft dose.          Medications:     Current Facility-Administered Medications   Medication Dose Route Frequency Provider Last Rate Last Admin    ARIPiprazole (ABILIFY) tablet 20 mg  20 mg Oral QAM Benjamin Patterson MD   20 mg at 06/04/25 0801    benztropine (COGENTIN) tablet 0.5 mg  0.5 mg Oral BID Benjamin Patterson MD   0.5 mg at 06/04/25 0802    Brivaracetam (BRIVIACT) tablet 150 mg  150 mg Oral BID Benjamin Patterson MD   150 mg at 06/04/25 0759    budesonide-formoterol (SYMBICORT/BREYNA) 160-4.5 MCG/ACT inhaler 2 puff  2 puff Inhalation BID Benjamin Patterson MD   2 puff at 06/04/25 0803    carBAMazepine (TEGretol) tablet 200 mg  200 mg Oral BID Benjamin Patterson MD   " 200 mg at 06/04/25 0801    carBAMazepine (TEGretol) tablet 400 mg  400 mg Oral Daily Jim Beasley PA-C   400 mg at 06/04/25 1120    cyanocobalamin (VITAMIN B-12) tablet 1,000 mcg  1,000 mcg Oral Daily Benjamin Patterson MD   1,000 mcg at 06/04/25 0801    DULoxetine (CYMBALTA) DR capsule 120 mg  120 mg Oral Daily Benjamin Patterson MD   120 mg at 06/04/25 0800    enoxaparin ANTICOAGULANT (LOVENOX) injection 120 mg  0.75 mg/kg Subcutaneous Q12H Benjamin Patterson MD   120 mg at 06/04/25 0803    fluticasone (FLONASE) 50 MCG/ACT spray 1 spray  1 spray Both Nostrils BID Benjamin Patterson MD   1 spray at 06/04/25 0803    levothyroxine (SYNTHROID/LEVOTHROID) tablet 300 mcg  300 mcg Oral QAM AC Benjamin Patterson MD   300 mcg at 06/04/25 0800    pantoprazole (PROTONIX) EC tablet 40 mg  40 mg Oral QAM AC Benjamin Patterson MD   40 mg at 06/04/25 0802    prazosin (MINIPRESS) capsule 2 mg  2 mg Oral At Bedtime Benjamin Patterson MD   2 mg at 06/03/25 2056    predniSONE (DELTASONE) tablet 5 mg  5 mg Oral Daily Benjamin Patterson MD   5 mg at 06/04/25 0802    pregabalin (LYRICA) capsule 25 mg  25 mg Oral BID Benjamin Patterson MD   25 mg at 06/04/25 0801    QUEtiapine (SEROquel) half-tab 150 mg  150 mg Oral At Bedtime Benjamin Patterson MD   150 mg at 06/03/25 2056    rosuvastatin (CRESTOR) tablet 20 mg  20 mg Oral Daily Benjamin Patterson MD   20 mg at 06/04/25 0802    sennosides (SENOKOT) tablet 2 tablet  2 tablet Oral BID Benjamin Patterson MD   2 tablet at 06/04/25 0801    sertraline (ZOLOFT) tablet 25 mg  25 mg Oral Daily Benjamin Patterson MD   25 mg at 06/04/25 0802    Vitamin D3 (CHOLECALCIFEROL) tablet 100 mcg  100 mcg Oral Daily Benjamin Patterson MD   100 mcg at 06/04/25 0800    warfarin ANTICOAGULANT (COUMADIN/JANTOVEN) tablet 12.5 mg  12.5 mg Oral ONCE at 18:00 Benjamin Patterson MD        Warfarin Dose Required Daily - Pharmacist Managed  1 each Oral  See Admin Instructions Jim Beasley PA-C              Allergies:     Allergies   Allergen Reactions    Aspirin Shortness Of Breath    Bees Anaphylaxis    Lamotrigine Dizziness and Unknown    Latex Itching    Phenobarbital      aggression    Vistaril [Hydroxyzine] Other (See Comments)     Sluggish, unable to wake up    Gabapentin Rash          Labs:     Recent Results (from the past 24 hours)   INR    Collection Time: 06/04/25  8:33 AM   Result Value Ref Range    INR 1.67 (H) 0.85 - 1.15    PT 20.1 (H) 11.8 - 14.8 Seconds          Psychiatric Examination:     /78 (BP Location: Right arm, Patient Position: Sitting)   Pulse 86   Temp 97.8  F (36.6  C) (Oral)   Resp 16   Wt (!) 152.3 kg (335 lb 11.2 oz)   LMP  (LMP Unknown)   SpO2 94%   BMI 54.18 kg/m    Weight is 335 lbs 11.2 oz  Body mass index is 54.18 kg/m .    Orthostatic Vitals       None            Appearance: awake and laying in bed, slightly unkempt   Attitude:  slightly uncooperative  Eye Contact:  fair  Mood:  depressed  Affect:  mood congruent, less labile and not tearful  Speech:  clear, coherent  Psychomotor Behavior:  intermittent tic of right hand  Throught Process:  more logical and linear   Associations:  no loose associations  Thought Content:  no evidence of suicidal ideation or homicidal ideation; no obvious hallucinations or delusions  Insight:  partial  Judgement:  limited  Oriented to:  time, person, and place  Attention Span and Concentration:  limited  Recent and Remote Memory:  limited    Clinical Global Impressions  First: 6/4 6/3/2025      Most recent:            Precautions:     Behavioral Orders   Procedures    Code 1 - Restrict to Unit    Fall precautions    Routine Programming     As clinically indicated    Seizure precautions    Status 15     Every 15 minutes.    Status Individual Observation     Patient SIO status reviewed with team/RN.  Please also refer to RN/team documentation for add'l detail.    -SIO staff to  monitor following which have contributed to patient being on SIO:  Fall risk  -Possible interventions SIO staff could use to support patient's treatment progress:  Staying close to patient, helping with transfers and during ambulation  -When following observed, team will review discontinuation of SIO:  Will stop SIO when is more steady on feet, decreased risk of falls.     CONTINUOUS 24 hours / day:   5 feet     Indications for SIO:   Self-injury risk    Suicide precautions: Suicide Risk: MODERATE; Clinical rationale to override score: response to medication     Patients on Suicide Precautions should have a Combination Diet ordered that includes a Diet selection(s) AND a Behavioral Tray selection for Safe Tray - with utensils, or Safe Tray - NO utensils       Suicide Risk:   MODERATE     Clinical rationale to override score::   response to medication          DIagnoses:     Unspecified anxiety  Unspecified Depressive disorder with SI, rule out Major depressive disorder, recurrent, severe;  Borderline personality DO, possible, histrionic PD is suspected as well.         Plan:     Overall patient is slowly improving and continues to progress towards goal. Biggest barrier remains emotional lability and motivation to engage in environment. Has been doing well attending group and spending time in milieu, but spent lots of time alone in bed today. Would like to see improvement in engagement in groups, ADLs, and emotional stabilization.    - Increase Zoloft dose from 25 mg to 50 mg   - Continue SIO for transfers and fall risk         I was present with PA student who participated in the service and in the documentation of the note. I have verified the history and personally performed the physical exam and medical decision making. I agree with the assessment and plan of care as documented in the note.     Benjamin Patterson MD  [facility]    Total time spent was 35 minutes. Over 50% of times was  spent counseling and coordination of care regarding coping skills, medication and discharge planning.

## 2025-06-04 NOTE — PLAN OF CARE
NOC PLAN OF CARE   Problem: Sleep Disturbance  Goal: Adequate Sleep/Rest  Outcome: Progressing   Goal Outcome Evaluation:    Pt in bed at beginning of shift, breathing quiet and unlabored.Pt continues on an SIO at 5 ft, 24 hrs/day for SIB risk.     Pt C/O not able to fall asleep and requested PRN intervention and administered 50 mg trazodone at 0211; pt also had a saltine cracker post PRN administration; was noted sleeping at 0230 -15 minute safety rounding; No sign of pain or distress noted.       Appeared to have slept for 6.5 hrs

## 2025-06-04 NOTE — PLAN OF CARE
Patient has spent majority of the shift in the milieu. Attended the evening group and worked on puzzles. Denies suicidal ideation and self injurious thoughts. Reports being anxious and depressed, but says her mood is better today. Denies auditory and visual hallucinations. Affect is flat and guarded. Med compliant. Cooperative on the unit.     Patient evaluation continues. Assessed mood,anxiety,thoughts and behavior.     Patient gradually progressing towards goals.    Patient is encouraged to participate in groups and assisted to develop healthy coping skills.     VS reviewed: /78 (BP Location: Right arm, Patient Position: Sitting)   Pulse 86   Temp 97.8  F (36.6  C) (Oral)   Resp 16   Wt (!) 152.3 kg (335 lb 11.2 oz)   LMP  (LMP Unknown)   SpO2 94%   BMI 54.18 kg/m      Length of stay: 6    Refer to daily team meeting notes for individualized plan of care. Nursing will continue to assess.

## 2025-06-04 NOTE — PLAN OF CARE
Group Attendance:  attended full group    Time session began: 1315  Time session ended: 1400  Patient's total time in group: 45    Total # Attendees   4   Group Type psychotherapeutic, psychoeducational, and DBT     Group Topic Covered emotional regulation, insight improvement, symptom management, healthy coping skills, DBT skills: IMPROVE, and relaxation and grounding techniques/coping skills     Group Session Detail Group members checked in before the activity. Group members discussed the DBT acronym IMPROVE, discussing the meaning of each and putting them into practice.      Patient's response to the group topic/interactions:  cooperative with task, supportive of peers, socially appropriate, listened actively, attentive, and actively engaged     Patient Details: Pt attended group, was able to engage with the topic sometimes, but at times didn't seem to understand the questions. Was not overly active in the discussion.           65526 - Group psychotherapy - 1 Session  Patient Active Problem List   Diagnosis    Essential hypertension    CHRIS (obstructive sleep apnea)    Seasonal and perennial allergic rhinoconjunctivitis of right eye    Closed head injury    History of thyroid cancer    Adrenal mass, left    Class 3 severe obesity due to excess calories with serious comorbidity and body mass index (BMI) of 50.0 to 59.9 in adult (H)    Nonintractable epilepsy without status epilepticus (H)    Esophageal reflux    Mixed hyperlipidemia    HLD (hyperlipidemia)    Hypothyroidism    Chronic obstructive pulmonary disease (H)    Schizoaffective disorder, depressive type (H)    DNR (do not resuscitate)    Migraine headache    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    Fracture of vertebra due to osteoporosis with routine healing, subsequent encounter    Major depressive disorder, recurrent episode, moderate (H)    Venous stasis    Peripheral polyneuropathy    Pulmonary embolism with infarction (H)    Idiopathic osteoporosis     Physical deconditioning    PTSD (post-traumatic stress disorder)    Long term (current) use of anticoagulants    History of melanoma    Seasonal affective disorder    Vision changes    Bilateral hand pain    Problem related to housing and economic circumstances    History of pulmonary embolism    Bilateral sensorineural hearing loss    Intertrigo    Dysfunction of both eustachian tubes    Chronic intractable pain    Strain of lumbar paraspinal muscle, initial encounter    Cervical stenosis of spinal canal    Cervical radiculopathy    History of colonic polyps    Hematoma of right lower leg    Nicotine use disorder    Right knee injury, sequela    Dysuria    History of stroke    Panic attack    Depression    Age-related osteoporosis without current pathological fracture    Stage 3b chronic kidney disease (H)

## 2025-06-04 NOTE — PROGRESS NOTES
Rehab Group     Start time: 1400  End time: 1440  Patient time total: 40 minutes     attended full group     #5 attended   Group Type: music   Group Topic Covered: coping skills, healthy leisure time, and relaxation       Group Session Detail: Relaxation and Reminiscence      Patient Response/Contribution:  neutral affect, cooperative with task      Patient Detail: Engaged in Afternoon Music Relaxation group to decrease anxiety and promote well-being.  Calm affect, engaged in session with some apparent loose associations/lack of social cue following, and responded neutrally to the music.     Pt talked about her room being locked because of her special bed during session in a non-sequitur fashion.  No redirections were needed, but pt appeared to lack social cues, which the peer group accommodated for.        Activity Therapy Per 15 min ()    Patient Active Problem List   Diagnosis    Essential hypertension    CHRIS (obstructive sleep apnea)    Seasonal and perennial allergic rhinoconjunctivitis of right eye    Closed head injury    History of thyroid cancer    Adrenal mass, left    Class 3 severe obesity due to excess calories with serious comorbidity and body mass index (BMI) of 50.0 to 59.9 in adult (H)    Nonintractable epilepsy without status epilepticus (H)    Esophageal reflux    Mixed hyperlipidemia    HLD (hyperlipidemia)    Hypothyroidism    Chronic obstructive pulmonary disease (H)    Schizoaffective disorder, depressive type (H)    DNR (do not resuscitate)    Migraine headache    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    Fracture of vertebra due to osteoporosis with routine healing, subsequent encounter    Major depressive disorder, recurrent episode, moderate (H)    Venous stasis    Peripheral polyneuropathy    Pulmonary embolism with infarction (H)    Idiopathic osteoporosis    Physical deconditioning    PTSD (post-traumatic stress disorder)    Long term (current) use of anticoagulants    History of melanoma     Seasonal affective disorder    Vision changes    Bilateral hand pain    Problem related to housing and economic circumstances    History of pulmonary embolism    Bilateral sensorineural hearing loss    Intertrigo    Dysfunction of both eustachian tubes    Chronic intractable pain    Strain of lumbar paraspinal muscle, initial encounter    Cervical stenosis of spinal canal    Cervical radiculopathy    History of colonic polyps    Hematoma of right lower leg    Nicotine use disorder    Right knee injury, sequela    Dysuria    History of stroke    Panic attack    Depression    Age-related osteoporosis without current pathological fracture    Stage 3b chronic kidney disease (H)

## 2025-06-04 NOTE — PLAN OF CARE
"Pt was visible in the milieu spending majority of the evening in the dinning area putting a puzzle together with SIO staff. Pt presents with flat affect. Endorses anxiety 6/10 and depression 4/10. Prn ativan was given.Endorses suicidal thoughts only saying \"I think about it all the time but I can't do it here. Pt contracts for safety while in the hospital. When asked if she has hallucinations pt says that some times sees \"a Blap\" on her side and when she turns around it disappears. Pt describes her mood this evening as \"gloomy\". Pt was compliant with medications. Pt took prn Trazodone at bedtime. Vital signs: Temp: 97.6  F (36.4  C) Temp src: Oral BP: 112/82 Pulse: 88     SpO2: 99 % O2 Device: None (Room air)             Goal Outcome Evaluation    Plan of Care Reviewed With: patient      Problem: Suicide Risk  Goal: Absence of Self-Harm  Outcome: Progressing                      "

## 2025-06-04 NOTE — PLAN OF CARE
Problem: Adult Behavioral Health Plan of Care  Goal: Optimized Coping Skills in Response to Life Stressors  Outcome: Progressing   Goal Outcome Evaluation:     Plan of Care Reviewed With: patient                Pt. SIO for safety/fall      Pt. Presents with a labile and depressed affect,  Spent the first half of the shift resting in bed isolative to self, declined breakfast, easily awoken for AM medications and Lovenox injection. Pt out in the lounge all afternoon, excellent appetite. Pt independent with ADL, toileting and transfers, self propels W/C around the unit without difficulties. Spent the afternoon attending and participating in group.       Speech is clear and coherent, maintains eye contact, thought process appears organized with assessment question, hygiene is unkempt.    Compliance with AM lab draw.     Pt endorses depression and anxiety, denies SI/HI/SIB and hallucination, medication compliance and contracted for safety.     Blood pressure 138/78, pulse 86, temperature 97.8  F (36.6  C), temperature source Oral, resp. rate 16, weight (!) 152.3 kg (335 lb 11.2 oz), SpO2 94%, not currently breastfeeding.

## 2025-06-04 NOTE — PLAN OF CARE
BEH IP Unit Acuity Rating Score (UARS)  Patient is given one point for every criteria they meet.    CRITERIA SCORING   On a 72 hour hold, court hold, committed, stay of commitment, or revocation. 0    Patient LOS on BEH unit exceeds 20 days. 0  LOS: 7   Patient under guardianship, 55+, otherwise medically complex, or under age 11. 1   Suicide ideation without relief of precipitating factors. 1   Current plan for suicide. 0   Current plan for homicide. 0   Imminent risk or actual attempt to seriously harm another without relief of factors precipitating the attempt. 0   Severe dysfunction in daily living (ex: complete neglect for self care, extreme disruption in vegetative function, extreme deterioration in social interactions). 1   Recent (last 7 days) or current physical aggression in the ED or on unit. 0   Restraints or seclusion episode in past 72 hours. 0   Recent (last 7 days) or current verbal aggression, agitation, yelling, etc., while in the ED or unit. 0   Active psychosis. 0   Need for constant or near constant redirection (from leaving, from others, etc).  0   Intrusive or disruptive behaviors. 0   Patient requires 3 or more hours of individualized nursing care per 8-hour shift (i.e. for ADLs, meds, therapeutic interventions). 1   TOTAL 3

## 2025-06-05 ENCOUNTER — DOCUMENTATION ONLY (OUTPATIENT)
Dept: ANTICOAGULATION | Facility: CLINIC | Age: 65
End: 2025-06-05
Payer: COMMERCIAL

## 2025-06-05 VITALS
OXYGEN SATURATION: 96 % | HEART RATE: 97 BPM | WEIGHT: 293 LBS | BODY MASS INDEX: 54.18 KG/M2 | TEMPERATURE: 98.3 F | SYSTOLIC BLOOD PRESSURE: 114 MMHG | DIASTOLIC BLOOD PRESSURE: 81 MMHG | RESPIRATION RATE: 18 BRPM

## 2025-06-05 LAB
HOLD SPECIMEN: NORMAL
HOLD SPECIMEN: NORMAL
INR PPP: 1.63 (ref 0.85–1.15)
LMWH PPP CHRO-ACNC: 1.94 IU/ML (ref ?–2)
PROTHROMBIN TIME: 19.8 SECONDS (ref 11.8–14.8)

## 2025-06-05 PROCEDURE — 250N000011 HC RX IP 250 OP 636: Performed by: PSYCHIATRY & NEUROLOGY

## 2025-06-05 PROCEDURE — 36415 COLL VENOUS BLD VENIPUNCTURE: CPT

## 2025-06-05 PROCEDURE — 250N000012 HC RX MED GY IP 250 OP 636 PS 637: Performed by: PSYCHIATRY & NEUROLOGY

## 2025-06-05 PROCEDURE — 250N000013 HC RX MED GY IP 250 OP 250 PS 637

## 2025-06-05 PROCEDURE — 90853 GROUP PSYCHOTHERAPY: CPT

## 2025-06-05 PROCEDURE — 250N000013 HC RX MED GY IP 250 OP 250 PS 637: Performed by: PSYCHIATRY & NEUROLOGY

## 2025-06-05 PROCEDURE — 85610 PROTHROMBIN TIME: CPT

## 2025-06-05 PROCEDURE — 36415 COLL VENOUS BLD VENIPUNCTURE: CPT | Performed by: PSYCHIATRY & NEUROLOGY

## 2025-06-05 PROCEDURE — 124N000002 HC R&B MH UMMC

## 2025-06-05 PROCEDURE — 85520 HEPARIN ASSAY: CPT | Performed by: PSYCHIATRY & NEUROLOGY

## 2025-06-05 RX ORDER — BENZTROPINE MESYLATE 1 MG/1
1 TABLET ORAL 2 TIMES DAILY
Status: DISPENSED | OUTPATIENT
Start: 2025-06-05

## 2025-06-05 RX ADMIN — FLUTICASONE PROPIONATE 1 SPRAY: 50 SPRAY, METERED NASAL at 08:32

## 2025-06-05 RX ADMIN — LEVOTHYROXINE SODIUM 300 MCG: 0.15 TABLET ORAL at 08:30

## 2025-06-05 RX ADMIN — BUDESONIDE AND FORMOTEROL FUMARATE DIHYDRATE 2 PUFF: 160; 4.5 AEROSOL RESPIRATORY (INHALATION) at 22:05

## 2025-06-05 RX ADMIN — CARBAMAZEPINE 400 MG: 200 TABLET ORAL at 12:17

## 2025-06-05 RX ADMIN — ENOXAPARIN SODIUM 120 MG: 120 INJECTION SUBCUTANEOUS at 22:04

## 2025-06-05 RX ADMIN — PREGABALIN 25 MG: 25 CAPSULE ORAL at 08:30

## 2025-06-05 RX ADMIN — CARBAMAZEPINE 200 MG: 200 TABLET ORAL at 22:04

## 2025-06-05 RX ADMIN — ACETAMINOPHEN 650 MG: 325 TABLET, FILM COATED ORAL at 16:28

## 2025-06-05 RX ADMIN — BRIVARACETAM 150 MG: 50 TABLET, FILM COATED ORAL at 22:04

## 2025-06-05 RX ADMIN — ARIPIPRAZOLE 20 MG: 10 TABLET ORAL at 08:30

## 2025-06-05 RX ADMIN — CYANOCOBALAMIN TAB 1000 MCG 1000 MCG: 1000 TAB at 08:31

## 2025-06-05 RX ADMIN — ENOXAPARIN SODIUM 120 MG: 120 INJECTION SUBCUTANEOUS at 09:57

## 2025-06-05 RX ADMIN — SERTRALINE HYDROCHLORIDE 50 MG: 25 TABLET ORAL at 08:31

## 2025-06-05 RX ADMIN — OLANZAPINE 10 MG: 10 TABLET, FILM COATED ORAL at 12:25

## 2025-06-05 RX ADMIN — ROSUVASTATIN 20 MG: 20 TABLET, FILM COATED ORAL at 08:30

## 2025-06-05 RX ADMIN — WARFARIN SODIUM 12.5 MG: 10 TABLET ORAL at 17:46

## 2025-06-05 RX ADMIN — SENNOSIDES 2 TABLET: 8.6 TABLET ORAL at 22:04

## 2025-06-05 RX ADMIN — FLUTICASONE PROPIONATE 1 SPRAY: 50 SPRAY, METERED NASAL at 22:05

## 2025-06-05 RX ADMIN — Medication 150 MG: at 22:04

## 2025-06-05 RX ADMIN — BENZTROPINE MESYLATE 1 MG: 1 TABLET ORAL at 22:04

## 2025-06-05 RX ADMIN — BRIVARACETAM 150 MG: 50 TABLET, FILM COATED ORAL at 08:29

## 2025-06-05 RX ADMIN — BENZTROPINE MESYLATE 0.5 MG: 0.5 TABLET ORAL at 08:30

## 2025-06-05 RX ADMIN — SENNOSIDES 2 TABLET: 8.6 TABLET ORAL at 08:30

## 2025-06-05 RX ADMIN — CARBAMAZEPINE 200 MG: 200 TABLET ORAL at 08:30

## 2025-06-05 RX ADMIN — DULOXETINE 120 MG: 30 CAPSULE, DELAYED RELEASE ORAL at 08:29

## 2025-06-05 RX ADMIN — PRAZOSIN HYDROCHLORIDE 2 MG: 2 CAPSULE ORAL at 22:04

## 2025-06-05 RX ADMIN — PREDNISONE 5 MG: 5 TABLET ORAL at 08:31

## 2025-06-05 RX ADMIN — PANTOPRAZOLE SODIUM 40 MG: 40 TABLET, DELAYED RELEASE ORAL at 08:31

## 2025-06-05 RX ADMIN — PREGABALIN 25 MG: 25 CAPSULE ORAL at 22:04

## 2025-06-05 RX ADMIN — Medication 100 MCG: at 08:30

## 2025-06-05 RX ADMIN — BUDESONIDE AND FORMOTEROL FUMARATE DIHYDRATE 2 PUFF: 160; 4.5 AEROSOL RESPIRATORY (INHALATION) at 08:32

## 2025-06-05 RX ADMIN — TRAZODONE HYDROCHLORIDE 50 MG: 50 TABLET ORAL at 22:04

## 2025-06-05 RX ADMIN — BISACODYL 5 MG: 5 TABLET, COATED ORAL at 13:24

## 2025-06-05 ASSESSMENT — ACTIVITIES OF DAILY LIVING (ADL)
ADLS_ACUITY_SCORE: 98
ADLS_ACUITY_SCORE: 88
ADLS_ACUITY_SCORE: 98
ADLS_ACUITY_SCORE: 88
ADLS_ACUITY_SCORE: 98
ADLS_ACUITY_SCORE: 88
ADLS_ACUITY_SCORE: 98
ADLS_ACUITY_SCORE: 98
DRESS: SCRUBS (BEHAVIORAL HEALTH)
ADLS_ACUITY_SCORE: 98
ADLS_ACUITY_SCORE: 98
DRESS: SCRUBS (BEHAVIORAL HEALTH);INDEPENDENT
ADLS_ACUITY_SCORE: 88
ORAL_HYGIENE: PROMPTS
HYGIENE/GROOMING: INDEPENDENT;PROMPTS
HYGIENE/GROOMING: PROMPTS
ADLS_ACUITY_SCORE: 98
ADLS_ACUITY_SCORE: 98
ADLS_ACUITY_SCORE: 88
ORAL_HYGIENE: PROMPTS;INDEPENDENT
ADLS_ACUITY_SCORE: 88
ADLS_ACUITY_SCORE: 88

## 2025-06-05 NOTE — PROGRESS NOTES
"[facility], [place]   Psychiatric Progress Note        Interim History:   The patient's care was discussed with the treatment team during the daily team meeting and/or staff's chart notes were reviewed.  Staff report patient slept for 6.25 hours last night. She spent some time resting in her room, but also mostly in the afternoon and evenings went to groups, was seen working on puzzles and playing cards with peers. Appeared to be better with transferring self and toiletting and not needing SIO help. Compliant with meds. See RN's note below:    \"Pt. Presents with a flat and depressed affect,  Spent the morning resting in bed, declined breakfast, easily awoken for AM medications and her Lovenox injection. Pt out in the lounge all afternoon, eat 100% of lunch minimal engagement with peers. Pt independent with ADL, toileting and transfers, self propels W/C around the unit without difficulties. Spent the afternoon participating in group and working on a puzzle. Pt. C/O of constipation PRN Doculax administered.      Speech is clear and coherent, maintains eye contact, thought process appears organized with assessment question, hygiene is unkempt.     Spoke with IM regarding timeframe of her daily Lovenox injections, IM working on further testing, no stop date at this time.      Pt endorses depression and anxiety PRN Zyprexa helpful, denies SI/HI/SIB and hallucination, medication compliance and contracted for safety.      Blood pressure 112/82, pulse 88, temperature 97.6  F (36.4  C), temperature source Oral, resp. rate 16, weight (!) 152.3 kg (335 lb 11.2 oz), SpO2 99%, not currently breastfeeding.\"    Met with patient: Met patient in the presence of PA student in the conference room. She wheeled in and was staying in her wheelchair throughout our whole visit. Maintained good, at times tense eye contact, exhibited visible high amplitude tremor more in left upper extremity. Reported that she " "was doing OK and her mood was not too bad, but immediately became tearful when we asked about discharge plans. It took short time for Valentina to calm down, then, we asked her what she was getting on this floor that she didn't have at home. Valentina immediately told us that she felt that she needed \"to talk to somebody, because my  doesn't talk to me\"! We asked for clarification. Valentina with more questions admitted that she felt lonely and not supported at home. We discussed if she would like to have individual therapist or go to Day Treatment. Valentina said that she had negative experience in Day Treatment: \"I only listened to other people problems and didn't talk myself\". She added that she would like in person counseling, hopefully, at least couple of times per week and that her  would drive her to appointments. Valentina agreed that she didn't need SIO staff. She continued to exhibit tremor and agreed with suggestion to increase her dose of Cogentin and had no other questions or concerns. Lab work from 6/5/2025 was reviewed.         Medications:     Current Facility-Administered Medications   Medication Dose Route Frequency Provider Last Rate Last Admin    ARIPiprazole (ABILIFY) tablet 20 mg  20 mg Oral QAM Benjamin Patterson MD   20 mg at 06/05/25 0830    benztropine (COGENTIN) tablet 1 mg  1 mg Oral BID Benjamin Patterson MD        Brivaracetam (BRIVIACT) tablet 150 mg  150 mg Oral BID Benjamin Patterson MD   150 mg at 06/05/25 0829    budesonide-formoterol (SYMBICORT/BREYNA) 160-4.5 MCG/ACT inhaler 2 puff  2 puff Inhalation BID Benjamin Patterson MD   2 puff at 06/05/25 0832    carBAMazepine (TEGretol) tablet 200 mg  200 mg Oral BID Benjamin Patterson MD   200 mg at 06/05/25 0830    carBAMazepine (TEGretol) tablet 400 mg  400 mg Oral Daily Jim Beasley PA-C   400 mg at 06/05/25 1217    cyanocobalamin (VITAMIN B-12) tablet 1,000 mcg  1,000 mcg Oral Daily Benjamin Patterson MD   1,000 mcg at " 06/05/25 0831    DULoxetine (CYMBALTA) DR capsule 120 mg  120 mg Oral Daily Benjamin Patterson MD   120 mg at 06/05/25 0829    enoxaparin ANTICOAGULANT (LOVENOX) injection 120 mg  0.75 mg/kg Subcutaneous Q12H Benjamin Patterson MD   120 mg at 06/05/25 0957    fluticasone (FLONASE) 50 MCG/ACT spray 1 spray  1 spray Both Nostrils BID Benjamin Patterson MD   1 spray at 06/05/25 0832    levothyroxine (SYNTHROID/LEVOTHROID) tablet 300 mcg  300 mcg Oral QAM AC Benjamin Patterson MD   300 mcg at 06/05/25 0830    pantoprazole (PROTONIX) EC tablet 40 mg  40 mg Oral QAM AC Benjamin Patterson MD   40 mg at 06/05/25 0831    prazosin (MINIPRESS) capsule 2 mg  2 mg Oral At Bedtime Benjamin Patterson MD   2 mg at 06/04/25 2042    predniSONE (DELTASONE) tablet 5 mg  5 mg Oral Daily Benjmain Patterson MD   5 mg at 06/05/25 0831    pregabalin (LYRICA) capsule 25 mg  25 mg Oral BID Benjamin Patterson MD   25 mg at 06/05/25 0830    QUEtiapine (SEROquel) half-tab 150 mg  150 mg Oral At Bedtime Benjamin Patterson MD   150 mg at 06/04/25 2042    rosuvastatin (CRESTOR) tablet 20 mg  20 mg Oral Daily Benjamin Patterson MD   20 mg at 06/05/25 0830    sennosides (SENOKOT) tablet 2 tablet  2 tablet Oral BID Benjamin Patterson MD   2 tablet at 06/05/25 0830    sertraline (ZOLOFT) tablet 50 mg  50 mg Oral Daily Benjamin Patterson MD   50 mg at 06/05/25 0831    Vitamin D3 (CHOLECALCIFEROL) tablet 100 mcg  100 mcg Oral Daily Benjamin Patterson MD   100 mcg at 06/05/25 0830    warfarin ANTICOAGULANT (COUMADIN/JANTOVEN) tablet 12.5 mg  12.5 mg Oral ONCE at 18:00 Benjamin Patterson MD        Warfarin Dose Required Daily - Pharmacist Managed  1 each Oral See Admin Instructions Jim Beasley PA-C              Allergies:     Allergies   Allergen Reactions    Aspirin Shortness Of Breath    Bees Anaphylaxis    Lamotrigine Dizziness and Unknown    Latex Itching    Phenobarbital      aggression     Vistaril [Hydroxyzine] Other (See Comments)     Sluggish, unable to wake up    Gabapentin Rash          Labs:     Recent Results (from the past 24 hours)   INR    Collection Time: 06/05/25  8:11 AM   Result Value Ref Range    INR 1.63 (H) 0.85 - 1.15    PT 19.8 (H) 11.8 - 14.8 Seconds   Extra Green Top (Lithium Heparin) Tube    Collection Time: 06/05/25  8:11 AM   Result Value Ref Range    Hold Specimen JIC    Extra Purple Top Tube    Collection Time: 06/05/25  8:11 AM   Result Value Ref Range    Hold Specimen JIC    Low Molecular Weight Heparin Anti Xa Level    Collection Time: 06/05/25 12:37 PM   Result Value Ref Range    Anti Xa Low Molecular Weight 1.94 For Reference Range, See Comment IU/mL          Psychiatric Examination:     /82 (BP Location: Left arm)   Pulse 88   Temp 97.6  F (36.4  C) (Oral)   Resp 16   Wt (!) 152.3 kg (335 lb 11.2 oz)   LMP  (LMP Unknown)   SpO2 99%   BMI 54.18 kg/m    Weight is 335 lbs 11.2 oz  Body mass index is 54.18 kg/m .    Orthostatic Vitals       None           Appearance: awake and laying in bed, slightly unkempt   Attitude:  more cooperative  Eye Contact:  fair  Mood:  depressed  Affect:  mood congruent, short teary episode today  Speech:  clear, coherent  Psychomotor Behavior:  intermittent tremor of right hand  Throught Process:  more logical and linear   Associations:  no loose associations  Thought Content:  no evidence of suicidal ideation or homicidal ideation; no obvious hallucinations or delusions  Insight:  partial  Judgement:  limited  Oriented to:  time, person, and place  Attention Span and Concentration:  limited  Recent and Remote Memory:  limited    Clinical Global Impressions  First: 6/4 6/3/2025      Most recent:            Precautions:     Behavioral Orders   Procedures    Code 1 - Restrict to Unit    Fall precautions    Routine Programming     As clinically indicated    Seizure precautions    Status 15     Every 15 minutes.    Suicide  precautions: Suicide Risk: MODERATE; Clinical rationale to override score: response to medication     Patients on Suicide Precautions should have a Combination Diet ordered that includes a Diet selection(s) AND a Behavioral Tray selection for Safe Tray - with utensils, or Safe Tray - NO utensils       Suicide Risk:   MODERATE     Clinical rationale to override score::   response to medication          DIagnoses:     Unspecified anxiety  Unspecified Depressive disorder with SI, rule out Major depressive disorder, recurrent, severe;  Borderline personality DO, possible, histrionic PD is suspected as well.         Plan:     Overall patient is slowly improving and continues to progress towards goal. Biggest barrier remains emotional lability and motivation to engage in environment. Has been doing well attending group and spending time in milieu, but spent lots of time alone in bed today. Would like to see improvement in engagement in groups, ADLs, and emotional stabilization. Would benefit from seeing individual counselor on a regular basis in community due to need for support, See discussion above.    - Increase cogentin dose to 1 mg bid as of 6/5/2025. Will discontinue SIO.        I was present with PA student who participated in the service and in the documentation of the note. I have verified the history and personally performed the physical exam and medical decision making. I agree with the assessment and plan of care as documented in the note.     Benjamin Patterson MD  [facility] Psychiat[facility]    Total time spent was 39 minutes. Over 50% of times was spent counseling and coordination of care regarding coping skills, medication and discharge planning.

## 2025-06-05 NOTE — PHARMACY-ANTICOAGULATION SERVICE
Clinical Pharmacy - Warfarin Dosing Consult     Pharmacy has been consulted to manage this patient s warfarin therapy.  Indication: DVT/PE Prophylaxis  Therapy Goal: INR 2-3  Provider/Team: ROXI  Warfarin Prior to Admission: Yes  Warfarin PTA Regimen: 5 mg Th and 7.5 mg ROW  Significant drug interactions: carbamazepine  Recent documented change in oral intake/nutrition: Unknown    INR   Date Value Ref Range Status   05/29/2025 2.65 (H) 0.85 - 1.15 Final   05/28/2025 2.81 (H) 0.85 - 1.15 Final       Recommend warfarin 5 mg today.  Pharmacy will monitor Valentina Olmedo daily and order warfarin doses to achieve specified goal.      Please contact pharmacy as soon as possible if the warfarin needs to be held for a procedure or if the warfarin goals change.      
Drug Level Evaluation    Patient is currently receiving enoxaparin.  A [facility] anti-XA level was drawn at 1230 on 6/5/25. The measured level result is 1.94.   This medication level is invalid because it was drawn during the medication distribution phase - needs to be drawn 4 hours after dose given. No further assessment can be made at this time.  Continue current regimen.  Recheck level Tomorrow.  Pharmacy team will continue to follow.    Deja Morrell, PharmD     
12-Jun-2020

## 2025-06-05 NOTE — PLAN OF CARE
Problem: Adult Behavioral Health Plan of Care  Goal: Develops/Participates in Therapeutic [place] to Support Successful Transition  Intervention: Foster Therapeutic [place]  Recent Flowsheet Documentation  Taken 6/5/2025 1693 by Yoana Schmitz RN  Trust Relationship/Rapport:   choices provided   care explained   questions answered   questions encouraged   empathic listening provided   reassurance provided   Goal Outcome Evaluation:     Plan of Care Reviewed With: patient         Pt. SIO was discontinued at 12:00 noon, pt reports it going well     Pt. Presents with a flat and depressed affect,  Spent the morning resting in bed, declined breakfast, easily awoken for AM medications and her Lovenox injection. Pt out in the lounge all afternoon, eat 100% of lunch minimal engagement with peers. Pt independent with ADL, toileting and transfers, self propels W/C around the unit without difficulties. Spent the afternoon participating in group and working on a puzzle. Pt. C/O of constipation PRN Doculax administered.      Speech is clear and coherent, maintains eye contact, thought process appears organized with assessment question, hygiene is unkempt.     Spoke with IM regarding timeframe of her daily Lovenox injections, IM working on further testing, no stop date at this time.      Pt endorses depression and anxiety PRN Zyprexa helpful, denies SI/HI/SIB and hallucination, medication compliance and contracted for safety.     Blood pressure 112/82, pulse 88, temperature 97.6  F (36.4  C), temperature source Oral, resp. rate 16, weight (!) 152.3 kg (335 lb 11.2 oz), SpO2 99%, not currently breastfeeding.

## 2025-06-05 NOTE — PLAN OF CARE
Team Note Due:  Thursday    Assessment/Intervention/Current Symtoms and Care Coordination:  Chart review and met with team, discussed pt progress, symptomology, and response to treatment.  Discussed the discharge plan and any potential impediments to discharge.    Team Meeting  MD directed RN to call Internal Medicine to see how long pt needs to be on the special medicaiton.  Pt is more regulated.  She does get dysregulated when talking about discharge.  SIO is being discontinued.  Pt can discharge after       I approached the pt to sign TRAVIS for her psychiatry clinic.  She was in the dark in bed with SIO staff in chair.  She did sign the JENN and I faxed this.  She replied that she did not want a male therapist that they had at [facility].  She wants a female therapist.  I have updated the CC.  I called [facility] at 3:15PM and had to leave a vm.  Donna called back and scheduled the appt. I have placed thsi on the AVS.            IM note reads:  Start lovenox 0.75mg/kg BID for now to bridge until INR 2-3.             1 d ago 2 d ago 3 d ago 4 d ago 5 d ago 6 d ago      INR 1.63 High  1.67 High  1.59 High  1.49 High  1.73 High  2.04 High  2.48 High     PT 19.8 High  20.1 High  19.4 High  18.4 High  20.6 High  23.5 High               Discharge Plan or Goal:  Home with services  She can talk with her CADI CM about assisted living.         Barriers to Discharge:  Pt may want to extend her stay  Pt was started on a daily injection.     Referral Status:    Insurance Coverage for SErvices  [facility] Medicare Advantage with [place]    Psychiatry - CCs checking on where this is and if the  patient has an appointment  [facility]             Psychiatry: Tuesday, June 24, 2025 @ 11M  [facility] - [phone]  [address] [place] [id number]  Fax: [phone]         Therapy  [facility] only has male, pt wants a female    Home Health  Herlinda from [facility] @ [phone] called and said  They have terminated the patient.  [facility] has agreed to take the patient.      Behavioral Health/CADI :   Jose Morfin,  II  Behavioral Health Waiver   [facility]  [address] [address]  [address]  Cell:  [phone]  Fax:  [phone]  [email or web address]  Services Authorized:  Home Delivered Meals  Skilled Nursing 1x week - [facility] is will to see pt on Mondays, pt has to confirm this is okay  Homemaker with assit with personal care  [facility] - Individual Home Supports  [employer] - Personal Emergency Response        Legal Status:  Voluntary         Contacts (include TRAVIS status):      Pt signed TRAVIS for   VALENTIN WEST Spouse   [phone]           Upcoming Meetings and Dates/Important Information and next steps:

## 2025-06-05 NOTE — PLAN OF CARE
Goal Outcome Evaluation:    Initial meeting note:    Therapist introduced self to patient and discussed psychotherapy service available to patient.     Pt response: Pt expressed interest in meeting with therapist    Plan: Made plan to meet with pt again; began identifying goals     Pt is feeling teary and anxious today, discussed meeting again in future. She will attend groups this afternoon, slept through morning groups. She was given a fidget, a lavender patch  and a citrus aromatherapy inhaler and a somatic exercise sequence to regulate nervous system. She also took a PRN Zyprexa around lunch time according to her nurse and she is in the milieu working on a puzzle. Writer gave her encouragement that she is doing the right thing, trying her skills and that she is capable of lowering her anxiety using a combination of skills and medications.     Will check in to meet in the next few days.

## 2025-06-05 NOTE — PLAN OF CARE
"  Group Attendance:  attended full group    Time session began: 1700  Time session ended: 1730  Patient's total time in group: 30    Total # Attendees   5   Group Type DBT     Group Topic Covered DBT Wise Mind     Group Session Detail DBT [place] Mind skill was taught, worksheet completed to integrate previously learned skill by applying it to a situation in each participant s life. Group discussion was facilitated.      Patient's response to the group topic/interactions:  actively engaged     Patient Details: Valentina actively participated in the group discussion. She shared that she was operating in emotion mind when interacting with her care team, we explored how she might use wise mind to more effectively communicate her concerns. She stated that she's upset about discharge planning because she's \"not ready to discharge.\" When asked to identify, using her reason mind, what would indicate she's ready to discharge she stated, \"When I don't cry all the time!\"          22312 - Group psychotherapy - 1 Session  Patient Active Problem List   Diagnosis    Essential hypertension    CHRIS (obstructive sleep apnea)    Seasonal and perennial allergic rhinoconjunctivitis of right eye    Closed head injury    History of thyroid cancer    Adrenal mass, left    Class 3 severe obesity due to excess calories with serious comorbidity and body mass index (BMI) of 50.0 to 59.9 in adult (H)    Nonintractable epilepsy without status epilepticus (H)    Esophageal reflux    Mixed hyperlipidemia    HLD (hyperlipidemia)    Hypothyroidism    Chronic obstructive pulmonary disease (H)    Schizoaffective disorder, depressive type (H)    DNR (do not resuscitate)    Migraine headache    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    Fracture of vertebra due to osteoporosis with routine healing, subsequent encounter    Major depressive disorder, recurrent episode, moderate (H)    Venous stasis    Peripheral polyneuropathy    Pulmonary embolism with infarction (H)    " Idiopathic osteoporosis    Physical deconditioning    PTSD (post-traumatic stress disorder)    Long term (current) use of anticoagulants    History of melanoma    Seasonal affective disorder    Vision changes    Bilateral hand pain    Problem related to housing and economic circumstances    History of pulmonary embolism    Bilateral sensorineural hearing loss    Intertrigo    Dysfunction of both eustachian tubes    Chronic intractable pain    Strain of lumbar paraspinal muscle, initial encounter    Cervical stenosis of spinal canal    Cervical radiculopathy    History of colonic polyps    Hematoma of right lower leg    Nicotine use disorder    Right knee injury, sequela    Dysuria    History of stroke    Panic attack    Depression    Age-related osteoporosis without current pathological fracture    Stage 3b chronic kidney disease (H)

## 2025-06-05 NOTE — PLAN OF CARE
Problem: Sleep Disturbance  Goal: Adequate Sleep/Rest  Outcome: Progressing   Goal Outcome Evaluation:    Patient slept 6.25 Hours during the night. Patient is SIO 5 Ft for SIB. Safety checked was done every 15 minutes. No PRN was given. No concerned noted.

## 2025-06-05 NOTE — CARE PLAN
Rehab Group    Start time: 1705  End time: 1755  Patient time total: 20 minutes    attended partial group    #4 attended   Group Type: occupational therapy   Group Topic Covered: balanced lifestyle, cognitive activities, coping skills, educational support, emotional regulation, healthy leisure time, problem solving, self-esteem, and social skills       Group Session Detail:   Education provided on the benefits for healthy leisure and how it effects mental health with hands on choice of Sticker or Scratch Art project for concentration, creative expression, attention to detail, decision making, frustration tolerance, follow though, coping, mood stabilization, reality-based activity, healthy leisure exploration, improving self-awareness, building self-esteem, and socialization.        Patient Response/Contribution:  actively engaged       Patient Detail:  Pt was calm and pleasant upon approach.  Pt was a bit blunted in affect, though seemingly invested in her project.  Pt worked slowly but steadily while present.  She excused herself early to use the restroom though she did not return to group, opting to sit in the lounge and work on a jigsaw puzzle instead.          [address] OT Group (2 or more in attendance)      Patient Active Problem List   Diagnosis    Essential hypertension    CHRIS (obstructive sleep apnea)    Seasonal and perennial allergic rhinoconjunctivitis of right eye    Closed head injury    History of thyroid cancer    Adrenal mass, left    Class 3 severe obesity due to excess calories with serious comorbidity and body mass index (BMI) of 50.0 to 59.9 in adult (H)    Nonintractable epilepsy without status epilepticus (H)    Esophageal reflux    Mixed hyperlipidemia    HLD (hyperlipidemia)    Hypothyroidism    Chronic obstructive pulmonary disease (H)    Schizoaffective disorder, depressive type (H)    DNR (do not resuscitate)    Migraine headache    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    Fracture of vertebra due to  osteoporosis with routine healing, subsequent encounter    Major depressive disorder, recurrent episode, moderate (H)    Venous stasis    Peripheral polyneuropathy    Pulmonary embolism with infarction (H)    Idiopathic osteoporosis    Physical deconditioning    PTSD (post-traumatic stress disorder)    Long term (current) use of anticoagulants    History of melanoma    Seasonal affective disorder    Vision changes    Bilateral hand pain    Problem related to housing and economic circumstances    History of pulmonary embolism    Bilateral sensorineural hearing loss    Intertrigo    Dysfunction of both eustachian tubes    Chronic intractable pain    Strain of lumbar paraspinal muscle, initial encounter    Cervical stenosis of spinal canal    Cervical radiculopathy    History of colonic polyps    Hematoma of right lower leg    Nicotine use disorder    Right knee injury, sequela    Dysuria    History of stroke    Panic attack    Depression    Age-related osteoporosis without current pathological fracture    Stage 3b chronic kidney disease (H)

## 2025-06-05 NOTE — PLAN OF CARE
BEH IP Unit Acuity Rating Score (UARS)  Patient is given one point for every criteria they meet.    CRITERIA SCORING   On a 72 hour hold, court hold, committed, stay of commitment, or revocation. 0    Patient LOS on BEH unit exceeds 20 days. 0  LOS: 8   Patient under guardianship, 55+, otherwise medically complex, or under age 11. 1   Suicide ideation without relief of precipitating factors. 1   Current plan for suicide. 0   Current plan for homicide. 0   Imminent risk or actual attempt to seriously harm another without relief of factors precipitating the attempt. 0   Severe dysfunction in daily living (ex: complete neglect for self care, extreme disruption in vegetative function, extreme deterioration in social interactions). 1   Recent (last 7 days) or current physical aggression in the ED or on unit. 0   Restraints or seclusion episode in past 72 hours. 0   Recent (last 7 days) or current verbal aggression, agitation, yelling, etc., while in the ED or unit. 0   Active psychosis. 0   Need for constant or near constant redirection (from leaving, from others, etc).  0   Intrusive or disruptive behaviors. 0   Patient requires 3 or more hours of individualized nursing care per 8-hour shift (i.e. for ADLs, meds, therapeutic interventions). 0   TOTAL 2

## 2025-06-05 NOTE — PROGRESS NOTES
ANTICOAGULATION     Valentina Olmedo is overdue for an INR check.     Patient remains inpatient at [facility]. Will postpone reminder one week.    Kyleigh Jimenes, RN  6/5/2025  Anticoagulation Clinic  [facility] for routing messages: raquel MATHUR  [facility] patient phone line: [phone]

## 2025-06-05 NOTE — PLAN OF CARE
"Goal Outcome Evaluation:    Plan of Care Reviewed With: patient        PT and RN met in the pt's room. PT presents as flat, and blunted. Mood is anxious and depressed. Pt stated she was having back pain and rated it a 7/10. Pt requested PRN tylenol and heat pack. Tylenol 650mg was administered and was effective. PT stated she was depressed and rated it a 4/10 and rated anxiety as a 6/10. PT stated that the Zyprexa she received during day shift was somewhat helpful. Pt is withdrawn to self, but comes out of her room [place]. Pt denies any SI, HI, or SIB. Pt contracts for safety. Pt denies hallucinations or delusions. Pt is medication compliant. Appetite and fluid intake adequate. VSS.No medication side effects reported or observed this shift. Hygiene is unkept, and malodorous. PT stated she had a bowel movement yesterday but \"it wasn't much of anything\". Pt received bisacodyl (DULCOLAX) EC tablet 5 mg, at 06/05/25 1324 during day shift to help aid her in having a bowel movement. Pt did not have bowel movement this shift, but received scheduled night time dose of senna    PRNs Given during shift:  acetaminophen (TYLENOL) tablet 650 mg, 650 mg at 06/05/25 1628  traZODone (DESYREL) tablet 50 mg, 50 mg at 06/05/25 2204     Group Attendance:Attended group this evening  Pain: 7/10 back pain               "

## 2025-06-07 LAB
CREAT SERPL-MCNC: 0.7 MG/DL (ref 0.51–0.95)
EGFRCR SERPLBLD CKD-EPI 2021: >90 ML/MIN/1.73M2
FACT X ACT/NOR PPP CHRO: 24 % (ref 70–130)
INR PPP: 1.81 (ref 0.85–1.15)
MCV RBC AUTO: 84 FL (ref 78–100)
PLATELET # BLD AUTO: 188 10E3/UL (ref 150–450)
PROTHROMBIN TIME: 21.3 SECONDS (ref 11.8–14.8)

## 2025-06-08 LAB
HOLD SPECIMEN: NORMAL
HOLD SPECIMEN: NORMAL
INR PPP: 1.83 (ref 0.85–1.15)
PROTHROMBIN TIME: 21.6 SECONDS (ref 11.8–14.8)

## 2025-06-09 DIAGNOSIS — E03.9 ACQUIRED HYPOTHYROIDISM: ICD-10-CM

## 2025-06-09 LAB
FACT X ACT/NOR PPP CHRO: 22 % (ref 70–130)
HOLD SPECIMEN: NORMAL
HOLD SPECIMEN: NORMAL
INR PPP: 1.55 (ref 0.85–1.15)
LMWH PPP CHRO-ACNC: 1.06 IU/ML (ref ?–2)
PROTHROMBIN TIME: 18.9 SECONDS (ref 11.8–14.8)

## 2025-06-09 RX ORDER — LEVOTHYROXINE SODIUM 300 UG/1
300 TABLET ORAL
Qty: 28 TABLET | Refills: 12 | Status: SHIPPED | OUTPATIENT
Start: 2025-06-09

## 2025-06-10 LAB
HOLD SPECIMEN: NORMAL
INR PPP: 1.33 (ref 0.85–1.15)
MCV RBC AUTO: 84 FL (ref 78–100)
PLATELET # BLD AUTO: 183 10E3/UL (ref 150–450)
PROTHROMBIN TIME: 17 SECONDS (ref 11.8–14.8)

## 2025-06-11 ENCOUNTER — TELEPHONE (OUTPATIENT)
Dept: BEHAVIORAL HEALTH | Facility: CLINIC | Age: 65
End: 2025-06-11
Payer: COMMERCIAL

## 2025-06-11 LAB
INR PPP: 1.56 (ref 0.85–1.15)
LMWH PPP CHRO-ACNC: 0.88 IU/ML (ref ?–2)
PROTHROMBIN TIME: 19.1 SECONDS (ref 11.8–14.8)

## 2025-06-11 NOTE — TELEPHONE ENCOUNTER
Attempted to complete [facility] DA IP Consult. Patient declined IOP as recommended by treatment team because of commute. Offered additional referrals, patient decline at this time. Patient will move forward with an individual therapist post discharge.

## 2025-06-12 ENCOUNTER — TELEPHONE (OUTPATIENT)
Dept: ANTICOAGULATION | Facility: CLINIC | Age: 65
End: 2025-06-12
Payer: COMMERCIAL

## 2025-06-12 DIAGNOSIS — Z79.01 LONG TERM (CURRENT) USE OF ANTICOAGULANTS: ICD-10-CM

## 2025-06-12 DIAGNOSIS — Z86.711 HISTORY OF PULMONARY EMBOLISM: ICD-10-CM

## 2025-06-12 DIAGNOSIS — I26.99 PULMONARY EMBOLISM WITH INFARCTION (H): Primary | ICD-10-CM

## 2025-06-12 LAB
INR PPP: 1.7 (ref 0.85–1.15)
PROTHROMBIN TIME: 20.4 SECONDS (ref 11.8–14.8)

## 2025-06-12 NOTE — TELEPHONE ENCOUNTER
Left message for last noted home care nurse to call. Routing message request to contact so that home care can go out on 6/16.    Kindra Garay RN    [facility] Anticoagulation Clinic

## 2025-06-12 NOTE — TELEPHONE ENCOUNTER
ANTICOAGULATION  MANAGEMENT: Discharge Review    Valentina Olmedo chart reviewed for anticoagulation continuity of care    Hospital Admission on 5/28/25-6/12/25 for depression.    Discharge disposition: Home with Home Care (previously had)    Results:    Recent labs: (last 7 days)     06/05/25  1237 06/06/25  0915 06/06/25  1236 06/07/25  0809 06/08/25  0732 06/09/25  0812 06/09/25  1552 06/10/25  0800 06/11/25  0824 06/11/25  1200 06/12/25  0740   INR  --  1.75*  --  1.81* 1.83* 1.55*  --  1.33* 1.56*  --  1.70*   [id number]  --  24*  --  24* 22*  --   --   --   --   --   --    ALMWH 1.94  --  1.71  --   --   --  1.06  --   --  0.88  --      Anticoagulation inpatient management:     anticoagulation calendar updated with inpatient dosing-more warfarin that prior to admission,  enoxaparin    Additional factors affecting anticoagulation: Yes: Drinking Ensure Alive BID     PLAN     Discussed discharge plan with inpatient pharmacy [facility]. Anticipated to continue Ensure twice daily on discharge.  5 day warfarin average 11.5 mg daily.  Discussed maintenance dose dose of 15 mg Mon/Thur; 10 ROW (utilizing 5 mg tablet), discharging on enoxaparin.  INR check Monday with home care recommended    Evangelina Mercer [facility]  6/12/2025  Anticoagulation Clinic  [facility] for routing messages: raquel MATHUR  [facility] patient phone line: [phone]

## 2025-06-13 NOTE — TELEPHONE ENCOUNTER
Called the patient and she has not set up with home care. Did not take lovenox this am. Is having trouble giving herself the shot. Notes she will try. Reviewed that she will need to push the plunger in on the syringe for the medication to go into the body, as she noted she did not know how to get the medication out of the shot. Attempted to review that it is 60 mg of lovenox that she should be taking.    Reviewed warfarin dosing for this weekend.    Patient states she is very confused on her medications. Now is confused on all the other medications she left the hospital with. Reviewed the AVS for the patient and found the following: They will be able to get in touch with a Provider if needed. The unit number is [phone] .    She was given this number to call and review all other medications.    Patient is scheduled in clinic at 2:15 6/16 for INR to be rechecked.    Called patient back to see if she picked up her protein drinks and call went to voicemail.    Kindra Garay RN    [facility] Anticoagulation Clinic

## 2025-06-16 ENCOUNTER — PATIENT OUTREACH (OUTPATIENT)
Dept: CARE COORDINATION | Facility: CLINIC | Age: 65
End: 2025-06-16

## 2025-06-16 ENCOUNTER — TELEPHONE (OUTPATIENT)
Dept: ANTICOAGULATION | Facility: CLINIC | Age: 65
End: 2025-06-16

## 2025-06-16 ENCOUNTER — RESULTS FOLLOW-UP (OUTPATIENT)
Dept: ANTICOAGULATION | Facility: CLINIC | Age: 65
End: 2025-06-16

## 2025-06-16 ENCOUNTER — ANTICOAGULATION THERAPY VISIT (OUTPATIENT)
Dept: ANTICOAGULATION | Facility: CLINIC | Age: 65
End: 2025-06-16

## 2025-06-16 ENCOUNTER — TELEPHONE (OUTPATIENT)
Dept: SCHEDULING | Facility: CLINIC | Age: 65
End: 2025-06-16

## 2025-06-16 ENCOUNTER — LAB (OUTPATIENT)
Dept: LAB | Facility: CLINIC | Age: 65
End: 2025-06-16
Payer: COMMERCIAL

## 2025-06-16 ENCOUNTER — TELEPHONE (OUTPATIENT)
Dept: FAMILY MEDICINE | Facility: CLINIC | Age: 65
End: 2025-06-16

## 2025-06-16 DIAGNOSIS — Z79.01 LONG TERM (CURRENT) USE OF ANTICOAGULANTS: ICD-10-CM

## 2025-06-16 DIAGNOSIS — Z86.711 HISTORY OF PULMONARY EMBOLISM: ICD-10-CM

## 2025-06-16 DIAGNOSIS — I26.99 PULMONARY EMBOLISM WITH INFARCTION (H): ICD-10-CM

## 2025-06-16 DIAGNOSIS — N18.32 STAGE 3B CHRONIC KIDNEY DISEASE (H): Primary | ICD-10-CM

## 2025-06-16 DIAGNOSIS — I26.99 PULMONARY EMBOLISM WITH INFARCTION (H): Primary | ICD-10-CM

## 2025-06-16 LAB — INR BLD: 2.2 (ref 0.9–1.1)

## 2025-06-16 PROCEDURE — 36416 COLLJ CAPILLARY BLOOD SPEC: CPT

## 2025-06-16 PROCEDURE — 85610 PROTHROMBIN TIME: CPT

## 2025-06-16 RX ORDER — WARFARIN SODIUM 5 MG/1
TABLET ORAL
Qty: 204 TABLET | Refills: 0 | Status: SHIPPED | OUTPATIENT
Start: 2025-06-16

## 2025-06-16 NOTE — PROGRESS NOTES
Connected Care Resource Center Contact  [facility]/Voicemail     Clinical Data: Post-Discharge Outreach     Outreach attempted x 2.  Left message on patient's voicemail, providing [facility]'s central phone number of [phone] ([phone]) for questions/concerns and/or to schedule an appt with an [facility] provider, if they do not have a PCP.      Plan:  [facility] will do no further outreaches at this time.       LEVY Yepez  [facility] Resource [facility], [facility]    *Connected Care Resource Team does NOT follow patient ongoing. Referrals are identified based on internal discharge reports and the outreach is to ensure patient has an understanding of their discharge instructions.

## 2025-06-16 NOTE — TELEPHONE ENCOUNTER
MTM referral from: Transitions of Care (recent hospital discharge, TCU discharge, or ED visit)    MTM referral outreach attempt #2 on June 16, 2025 at 12:00 PM      Outcome: Spoke with patient patient wants a nurse to go to her house to do medication. She is working with home care.    Use [facility] med adv, psych JERAMIE discharged 6/12  for the carrier/Plan on the flowsheet          EDENILSON Oakes

## 2025-06-16 NOTE — TELEPHONE ENCOUNTER
Please see the June 16, 2025 Anticoagulation encounter for further information.    Kindra Garay RN    [facility] Anticoagulation Clinic

## 2025-06-16 NOTE — PROGRESS NOTES
ANTICOAGULATION MANAGEMENT     Valentina Olmedo 65 year old female is on warfarin with therapeutic INR result. (Goal INR 2.0-3.0)    Recent labs: (last 7 days)     06/16/25  1415   INR 2.2*       ASSESSMENT     Source(s): Chart Review  Previous INR was Subtherapeutic  Medication, diet, health changes since last INR chart reviewed; none identified         PLAN     Unable to reach Valentina today.    Left message to continue current dose of warfarin 15 mg tonight. Request call back for assessment.    Follow up required to confirm warfarin dose taken and assess for changes    Kindra Garay, RN  6/16/2025  Anticoagulation Clinic  [facility] for routing messages: raquel MATHUR  [facility] patient phone line: [phone]

## 2025-06-16 NOTE — TELEPHONE ENCOUNTER
General Call    Contacts       Contact Date/Time Type Contact Phone/Fax    06/16/2025 10:51 AM CDT Phone (Incoming) Valentina Olmedo (Self) [phone] (M)          Reason for Call:  Sleep study order    What are your questions or concerns:  Pt states she was recently hospitalized and is still recovering/not feeling well. Due to this, she missed her sleep study appointment on 6/15 at [facility]. Requesting a new order for the study be submitted so she can reschedule this appointment.    Date of last appointment with provider: 2/12/25    Could we send this information to you in [employer] or would you prefer to receive a phone call?:   Patient would prefer a phone call   Okay to leave a detailed message?: Yes at Cell number on file:    Telephone Information:   Mobile [phone]

## 2025-06-16 NOTE — PROGRESS NOTES
ANTICOAGULATION MANAGEMENT     Valentina Olmedo 65 year old female is on warfarin with therapeutic INR result. (Goal INR 2.0-3.0)    Recent labs: (last 7 days)     06/16/25  1415   INR 2.2*       ASSESSMENT     Source(s): Chart Review and Patient/Caregiver Call     Warfarin doses taken: Warfarin taken as instructed  Diet: No new diet changes identified, patient notes she does have ensure. Max protein twice daily.  Medication/supplement changes: patient notes she does not know how to take her medications. Notes she tried the number given but could not get a hold of anyone.  New illness, injury, or hospitalization: No  Signs or symptoms of bleeding or clotting: No  Previous result: Subtherapeutic  Additional findings: Refill needed today. Valentina meets all criteria for refill (current [facility] referral, visit with referring provider/group in last 15 months unless directed to return in 2 years in last referring provider visit note, lab monitoring up to date or not exceeding 2 weeks overdue). Rx instructions and quantity match patient's current dosing plan. 90 day supply with 0 refills granted per [facility] protocol   Stop the lovenox  Patient given the number to [facility] which is noted in discharge as the new agency.       PLAN     Recommended plan for no diet, medication or health factor changes affecting INR     Dosing Instructions: Continue your current warfarin dose with next INR in 4 days       Summary  As of 6/16/2025      Full warfarin instructions:  15 mg every Mon, Thu; 10 mg all other days   Next INR check:  6/20/2025               Telephone call with Valentina who agrees to plan and repeated back plan correctly    Lab visit scheduled    Education provided: None required    Plan made per [facility] anticoagulation protocol    Kindra Garay RN  6/16/2025  Anticoagulation Clinic  [facility] for routing messages: raquel MATHUR  [facility] patient phone line:  [phone]        _______________________________________________________________________     Anticoagulation Episode Summary       Current INR goal:  2.0-3.0   TTR:  48.8% (11.5 mo)   Target end date:  Indefinite   Send INR reminders to:  PIPO MATHUR    Indications    History of pulmonary embolism (Resolved) [Z86.711]  Pulmonary embolism with infarction (H) [I26.99]  Long term (current) use of anticoagulants [Z79.01]  History of pulmonary embolism [Z86.711]             Comments:  --             Anticoagulation Care Providers       Provider Role Specialty Phone number    Promise Vanegas MD Referring Family Medicine [phone]    Donald Rooney MD Referring Internal Medicine [phone]    Jose Maria Morin MD Referring Family Medicine [phone]

## 2025-06-16 NOTE — TELEPHONE ENCOUNTER
Patient Returning Call    Reason for call:  returning call    Information relayed to patient:      Patient has additional questions:  Yes    What are your questions/concerns:  please return patients call    Could we send this information to you in [employer] or would you prefer to receive a phone call?:   Patient would prefer a phone call   Okay to leave a detailed message?: Yes at Cell number on file:    Telephone Information:   Mobile [phone]

## 2025-06-17 ENCOUNTER — TELEPHONE (OUTPATIENT)
Dept: FAMILY MEDICINE | Facility: CLINIC | Age: 65
End: 2025-06-17
Payer: COMMERCIAL

## 2025-06-17 NOTE — TELEPHONE ENCOUNTER
Spoke with Valentina. Reiterated current dosing and recheck date. Went through every day and gave dosing in both mg and tablets.    Teja Sutherland RN

## 2025-06-17 NOTE — TELEPHONE ENCOUNTER
General Call    Contacts       Contact Date/Time Type Contact Phone/Fax    06/17/2025 10:28 AM CDT Phone (Incoming) Valentina Olmedo (Self) [phone] (M)          Reason for Call:  anticoagulation     What are your questions or concerns:  Would like to talk to a INR nurse regarding Warfarin.     Date of last appointment with provider: 6/16    Could we send this information to you in [employer] or would you prefer to receive a phone call?:   Patient would prefer a phone call   Okay to leave a detailed message?: Yes at Cell number on file:    Telephone Information:   Mobile [phone]

## 2025-06-17 NOTE — PROGRESS NOTES
Patient had prolonged mental health hospitalization.  She is very dependent on home care.  Hide we restart her previous home care regimen?  New order placed.  Please make this a priority if at all possible.

## 2025-06-18 NOTE — TELEPHONE ENCOUNTER
Last ov 2/12/25    Order unlinked from previously scheduled study    Please assist with scheduling

## 2025-06-23 ENCOUNTER — TELEPHONE (OUTPATIENT)
Dept: FAMILY MEDICINE | Facility: CLINIC | Age: 65
End: 2025-06-23

## 2025-06-23 ENCOUNTER — RESULTS FOLLOW-UP (OUTPATIENT)
Dept: ANTICOAGULATION | Facility: CLINIC | Age: 65
End: 2025-06-23

## 2025-06-23 ENCOUNTER — PATIENT OUTREACH (OUTPATIENT)
Dept: CARE COORDINATION | Facility: CLINIC | Age: 65
End: 2025-06-23

## 2025-06-23 ENCOUNTER — ANTICOAGULATION THERAPY VISIT (OUTPATIENT)
Dept: ANTICOAGULATION | Facility: CLINIC | Age: 65
End: 2025-06-23

## 2025-06-23 ENCOUNTER — TELEPHONE (OUTPATIENT)
Dept: SCHEDULING | Facility: CLINIC | Age: 65
End: 2025-06-23

## 2025-06-23 ENCOUNTER — LAB (OUTPATIENT)
Dept: LAB | Facility: CLINIC | Age: 65
End: 2025-06-23
Payer: COMMERCIAL

## 2025-06-23 DIAGNOSIS — Z79.01 LONG TERM (CURRENT) USE OF ANTICOAGULANTS: ICD-10-CM

## 2025-06-23 DIAGNOSIS — I26.99 PULMONARY EMBOLISM WITH INFARCTION (H): Primary | ICD-10-CM

## 2025-06-23 DIAGNOSIS — I26.99 PULMONARY EMBOLISM WITH INFARCTION (H): ICD-10-CM

## 2025-06-23 DIAGNOSIS — Z86.711 HISTORY OF PULMONARY EMBOLISM: ICD-10-CM

## 2025-06-23 LAB — INR BLD: 1.7 (ref 0.9–1.1)

## 2025-06-23 PROCEDURE — 36416 COLLJ CAPILLARY BLOOD SPEC: CPT

## 2025-06-23 PROCEDURE — 85610 PROTHROMBIN TIME: CPT

## 2025-06-23 NOTE — PROGRESS NOTES
Clinic Care Coordination Contact    Situation: Patient chart reviewed by care coordinator.    Background: [facility] referral received as patient had not heard from home care she was previously opened to post hospital discharge.    Assessment: Per chart review patient was open to [facility].      Plan/Recommendations: Writer will fax REBEKAH orders to [facility].    UPDATE-per chart review home care has resumed.  Will close [facility] Referral.  If additional needs are identified; PCP to place a new referral.

## 2025-06-23 NOTE — TELEPHONE ENCOUNTER
Home Care is calling regarding an established patient with [facility].  Requesting orders from: Jose Maria Morin RN APPROVED: RN able to provide verbal orders.  Home Care will send orders for signature.  RN will close encounter.  Is this a request for a temporary pause in the home care episode?  No    Orders Requested    Skilled Nursing  Request for initial certification (first set of orders)   Frequency: Weekly  RN gave verbal order: Yes        Phone number Home Care can be reached at: [phone]  Okay to leave a detailed message?: Yes    Paula Fam RN

## 2025-06-23 NOTE — TELEPHONE ENCOUNTER
General Call      Reason for Call:  Reporting INR results: 1.7    What are your questions or concerns:  Please call back at your earliest convenience with the plan going forward based on today's result.    Could we send this information to you in [place] or would you prefer to receive a phone call?:   Patient would prefer a phone call   Okay to leave a detailed message?: Yes at Cell number on file:    Telephone Information:   Mobile [phone]

## 2025-06-23 NOTE — PROGRESS NOTES
ANTICOAGULATION MANAGEMENT     Valentina Olmedo 65 year old female is on warfarin with subtherapeutic INR result. (Goal INR 2.0-3.0)    Recent labs: (last 7 days)     06/23/25  1315   INR 1.7*       ASSESSMENT     Warfarin Lab Questionnaire    Warfarin Doses Last 7 Days      6/23/2025     1:16 PM   Dose in Tablet or Mg   TAB or MG? milligram (mg)     Pt Rptd Dose SUNDAY MONDAY TUESDAY WED THURS FRIDAY SATURDAY 6/23/2025   1:16 PM 10 10 20 10 20 10 10         6/23/2025   Warfarin Lab Questionnaire   Missed doses within past 14 days? No  missed coumadin Wed and Thurs last week    Changes in diet or alcohol within past 14 days? No   Medication changes since last result? Yes   Please list: 20   Injuries or illness since last result? No   New shortness of breath, severe headaches or sudden changes in vision since last result? Yes   If yes, please explain:  blurred in both eyes pt denies slurred speech, drooling, facial drooping, etc. She states she had a procedure done on her eyes recently and she does not feel like the drops are working. She will call the eye doctor.    Abnormal bleeding since last result? No   Upcoming surgery, procedure? No               Previous result: Subtherapeutic  Additional findings: None       PLAN     Recommended plan for temporary change(s) affecting INR     Dosing Instructions: Continue your current warfarin dose with next INR in 4 days       Summary  As of 6/23/2025      Full warfarin instructions:  15 mg every Mon, Thu; 10 mg all other days   Next INR check:  6/27/2025               Telephone call with Valentina who verbalizes understanding and agrees to plan    Lab visit scheduled    Education provided: Please call back if any changes to your diet, medications or how you've been taking warfarin  Goal range and lab monitoring: goal range and significance of current result, Importance of therapeutic range, and Importance of following up at instructed interval  Symptom monitoring: monitoring for  clotting signs and symptoms and monitoring for stroke signs and symptoms    Plan made per [facility] anticoagulation protocol    Tosin Singh RN  6/23/2025  Anticoagulation Clinic  [facility] for routing messages: raquel MATHUR  [facility] patient phone line: [phone]        _______________________________________________________________________     Anticoagulation Episode Summary       Current INR goal:  2.0-3.0   TTR:  49.0% (11.5 mo)   Target end date:  Indefinite   Send INR reminders to:  PIPO MATHUR    Indications    History of pulmonary embolism (Resolved) [Z86.711]  Pulmonary embolism with infarction (H) [I26.99]  Long term (current) use of anticoagulants [Z79.01]  History of pulmonary embolism [Z86.711]             Comments:  --             Anticoagulation Care Providers       Provider Role Specialty Phone number    Promise Vanegas MD Referring Family Medicine [phone]    Donald Rooney MD Referring Internal Medicine [phone]    Jose Maria Morin MD Referring Family Medicine [phone]

## 2025-06-23 NOTE — LETTER
Att Intake:    Attached are the Resumption of Care orders for Valentina Olmedo   1960.  Please call Dr. Starr office [phone] if you have additional questions regarding these orders.    Thank you,  Shanna Tan RN  Clinic Care Coordination  [phone]      Documentation of Face to Face and Certification for Home Health Services     I attest that I saw or will see Valentina Olmedo on this date:  2025     This encounter with the patient was in whole, or in part, for medical condition, which is the primary reason for Home Health Care:       Patient Active Problem List   Diagnosis    Essential hypertension    CHRIS (obstructive sleep apnea)    Seasonal and perennial allergic rhinoconjunctivitis of right eye    Closed head injury    History of thyroid cancer    Adrenal mass, left    Class 3 severe obesity due to excess calories with serious comorbidity and body mass index (BMI) of 50.0 to 59.9 in adult (H)    Nonintractable epilepsy without status epilepticus (H)    Esophageal reflux    Mixed hyperlipidemia    HLD (hyperlipidemia)    Hypothyroidism    Chronic obstructive pulmonary disease (H)    Schizoaffective disorder, depressive type (H)    DNR (do not resuscitate)    Migraine headache    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    Fracture of vertebra due to osteoporosis with routine healing, subsequent encounter    Major depressive disorder, recurrent episode, moderate (H)    Venous stasis    Peripheral polyneuropathy    Pulmonary embolism with infarction (H)    Idiopathic osteoporosis    Physical deconditioning    PTSD (post-traumatic stress disorder)    Long term (current) use of anticoagulants    History of melanoma    Seasonal affective disorder    Vision changes    Bilateral hand pain    Problem related to housing and economic circumstances    History of pulmonary embolism    Bilateral sensorineural hearing loss    Intertrigo    Dysfunction of both eustachian tubes    Chronic intractable pain    Strain of  lumbar paraspinal muscle, initial encounter    Cervical stenosis of spinal canal    Cervical radiculopathy    History of colonic polyps    Hematoma of right lower leg    Nicotine use disorder    Right knee injury, sequela    Dysuria    History of stroke    Panic attack    Depression    Age-related osteoporosis without current pathological fracture    Stage 3b chronic kidney disease (H)      I certify that, based on my findings, the following services are medically necessary Home Health Services: Skilled Nursing Complex medication teaching and monitoring  Disease management  Complex aftercare       Additional services needed: Home Health Aide  Social Work       Further, I certify that my clinical findings support that this patient is homebound (i.e. absences from home require considerable and taxing effort and are for medical reasons or [employer] services or infrequently or of short duration when for other reasons) related to:Patient has difficulty ambulating >100 ft     Based on the above findings, I certify that this patient is confined to the home and needs intermittent skilled nursing care, physical therapy and/or speech therapy. The patient is under my care, and I have initiated the establishment of the plan of care. This patient will be followed by a physician who will periodically review the plan of care.        Physician/Provider to provide follow up care: Provider to follow patient: JOSE MARIA CAMP [[id number]]        Please be aware that coverage of these services is subject to the terms and limitations of your health insurance plan.  Call member services at your health plan with any benefit or coverage questions.  _______________________________________________________________________  Authorized by:  Jose Maria Camp MD       PLEASE EVALUATE AND TREAT (Evaluation timeline is 24 - 48 hrs. Please call if there is need for a variance to this timeline).     Medications:         Current Outpatient Medications    Medication Sig Dispense Refill    warfarin ANTICOAGULANT (COUMADIN/JANTOVEN) 5 MG tablet Take 15 mg by mouth every Monday and Thursday; take 10 mg by mouth once daily on all other days. 204 tablet 0    acetaminophen (TYLENOL) 500 MG tablet Take 1-2 tablets (500-1,000 mg) by mouth every 8 hours as needed for mild pain. 120 tablet 1    albuterol (PROAIR HFA) 108 (90 Base) MCG/ACT inhaler Inhale 2 puffs into the lungs every 4 hours as needed for shortness of breath or wheezing. 18 g 5    albuterol (PROVENTIL) (2.5 MG/3ML) 0.083% neb solution Take 1 vial (2.5 mg) by nebulization every 6 hours as needed for shortness of breath, wheezing or cough. 90 mL 0    ARIPiprazole (ABILIFY) 20 MG tablet Take 1 tablet (20 mg) by mouth every morning. 30 tablet 1    benztropine (COGENTIN) 1 MG tablet Take 1 tablet (1 mg) by mouth 2 times daily. 60 tablet 1    bisacodyl (DULCOLAX) 5 MG EC tablet Take 1 tablet (5 mg) by mouth daily as needed for constipation. 30 tablet 0    Brivaracetam (BRIVIACT) 100 MG tablet Take 150 mg by mouth 2 times daily         budesonide-formoterol (SYMBICORT/BREYNA) 160-4.5 MCG/ACT inhaler Inhale 2 puffs into the lungs 2 times daily. 10.2 g 2    calcium carbonate (TUMS) 500 MG chewable tablet Take 1 tablet (500 mg) by mouth 2 times daily as needed for heartburn. 60 tablet 0    carBAMazepine (TEGRETOL) 200 MG tablet 200mg in the AM 400mg at lunch and 200mg at HS        cyanocobalamin (VITAMIN B-12) 1000 MCG tablet Take 1 tablet (1,000 mcg) by mouth daily. 30 tablet 1    denosumab (PROLIA) 60 MG/ML SOSY injection Inject 60 mg Subcutaneous once Per pt report        DULoxetine (CYMBALTA) 60 MG capsule Take 2 capsules (120 mg) by mouth daily. 180 capsule 2    enoxaparin ANTICOAGULANT (LOVENOX) 60 MG/0.6ML syringe Inject 0.6 mLs (60 mg) subcutaneously every 12 hours. 36 mL 0    EPINEPHrine (ANY BX GENERIC EQUIV) 0.3 MG/0.3ML injection 2-pack Inject 0.3 mLs (0.3 mg) into the muscle as needed for anaphylaxis 2 each  1    esomeprazole (NEXIUM) 40 MG DR capsule Take 1 capsule (40 mg) by mouth every morning (before breakfast). Take 30-60 minutes before eating. 30 capsule 11    fluticasone (FLONASE) 50 MCG/ACT nasal spray INSTILL 1 SPRAY IN IN EACH NOSTRIL TWICE A DAY 16 g 2    levothyroxine (SYNTHROID/LEVOTHROID) 300 MCG tablet TAKE 1 TABLET BY MOUTH DAILY 28 tablet 12    LORazepam (ATIVAN) 0.5 MG tablet Take 1 tablet (0.5 mg) by mouth every 6 hours as needed for anxiety. 15 tablet 0    melatonin 3 MG tablet Take 1 tablet (3 mg) by mouth nightly as needed for sleep. 90 tablet 4    ondansetron (ZOFRAN ODT) 4 MG ODT tab Take 1 tablet (4 mg) by mouth every 8 hours as needed for nausea. 30 tablet 1    prazosin (MINIPRESS) 2 MG capsule Take 1 capsule (2 mg) by mouth at bedtime. 90 capsule 2    pregabalin (LYRICA) 25 MG capsule Take 1 capsule (25 mg) by mouth 2 times daily. 56 capsule 5    QUEtiapine Fumarate 150 MG TABS Take 1 tablet by mouth at bedtime. 30 tablet 2    rosuvastatin (CRESTOR) 20 MG tablet TAKE 1 TABLET BY MOUTH DAILY 90 tablet 1    SENNA-TIME 8.6 MG tablet TAKE 2 TABLETS (17.2MG) BY MOUTH TWICE A  tablet 11    sertraline (ZOLOFT) 50 MG tablet Take 1 tablet (50 mg) by mouth daily. 30 tablet 1    SUMAtriptan (IMITREX) 50 MG tablet Take 1 tablet (50 mg) by mouth at onset of headache for migraine May repeat in 2 hours. Max 4 tablets/24 hours. 12 tablet 3    traZODone (DESYREL) 50 MG tablet Take 1 tablet (50 mg) by mouth nightly as needed for sleep (may repeat after 60 minutes). 30 tablet 1    vitamin D3 (CHOLECALCIFEROL) 50 mcg (2000 units) tablet Take 2 tablets (100 mcg) by mouth daily. 180 tablet 3      Problems:      Patient Active Problem List   Diagnosis    Essential hypertension    CHRIS (obstructive sleep apnea)    Seasonal and perennial allergic rhinoconjunctivitis of right eye    Closed head injury    History of thyroid cancer    Adrenal mass, left    Class 3 severe obesity due to excess calories with serious  comorbidity and body mass index (BMI) of 50.0 to 59.9 in adult (H)    Nonintractable epilepsy without status epilepticus (H)    Esophageal reflux    Mixed hyperlipidemia    HLD (hyperlipidemia)    Hypothyroidism    Chronic obstructive pulmonary disease (H)    Schizoaffective disorder, depressive type (H)    DNR (do not resuscitate)    Migraine headache    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    Fracture of vertebra due to osteoporosis with routine healing, subsequent encounter    Major depressive disorder, recurrent episode, moderate (H)    Venous stasis    Peripheral polyneuropathy    Pulmonary embolism with infarction (H)    Idiopathic osteoporosis    Physical deconditioning    PTSD (post-traumatic stress disorder)    Long term (current) use of anticoagulants    History of melanoma    Seasonal affective disorder    Vision changes    Bilateral hand pain    Problem related to housing and economic circumstances    History of pulmonary embolism    Bilateral sensorineural hearing loss    Intertrigo    Dysfunction of both eustachian tubes    Chronic intractable pain    Strain of lumbar paraspinal muscle, initial encounter    Cervical stenosis of spinal canal    Cervical radiculopathy    History of colonic polyps    Hematoma of right lower leg    Nicotine use disorder    Right knee injury, sequela    Dysuria    History of stroke    Panic attack    Depression    Age-related osteoporosis without current pathological fracture    Stage 3b chronic kidney disease (H)      Diet:  None        Code Status:    Code Status: Prior     Allergies:  Aspirin, Bees, Lamotrigine, Latex, Phenobarbital, Vistaril [hydroxyzine], and Gabapentin             Order  Home Care Referral [9046.001] (Order [id number])  Referral Details    Referred By  Referred To   Jose Maria Morin MD   [address] [id number]   Phone: [phone]   Fax: [phone]    Diagnoses: Pulmonary embolism with infarction (H)   Long term (current) use of  anticoagulants   History of pulmonary embolism   Order: Home Care Referral         Patient Name  Valentina Olmedo MRN  [id number] Legal Sex  Female   1960     Patient Demographics    Address  [address] [place] [id number] Phone  [phone] (Home)  [phone] (Mobile) *Preferred* E-mail Address  [email or web address]     Base CPT Code (Reference Only)    Code CPT Chargeables   9046.001 9046      Existing Charges    Charge Line Charge Code Status Charge Trigger Charge Type   None          Order Information    Order Date/Time Release Date/Time Start Date/Time End Date/Time   25 02:08 PM None 2025 None     Order Details    Frequency Duration Priority Order Class   None None Routine: Next available opening  Home Care     Order Providers    Authorizing Provider Encounter Provider   Jose Maria Morin MD Callahan, Melissa C RN     ABN Associated with this Order    There is no ABN associated with this order.                    Ordering Provider's NPI: [id number]  Jose Maria Morin    Home Care Referral [[id number]]    Electronically signed by: Jose Maria Morin MD on 25 1408 Status: Active   Ordering user: Jose Maria Morin MD 25 140     Order History  Outpatient  Date/Time Action Taken User Additional Information   25 Sign Jose Maria Morin MD      Order Questions    Question Answer   Reason for Referral: Skilled Nursing   Note: Must select at least one of Skilled Nursing, Physical Therapy, and/or Speech Therapy in addition to any other services.   Skilled Nursing Eval and Treat for: Complex medication teaching and monitoring    Disease management    Complex aftercare   Additional Services Needed: Home Health Aide    Social Work   Is the patient homebound? Yes   Homebound Status (describe the functional limitations that support this patient is confined to his/her home. Medicaid recipients are not required to be homebound.): Patient has difficulty ambulating  >100 ft   I attest that I saw or will see the patient on this date: 6/27/2025   Provider to follow patient MAUDE CAMP            Reference Links           Associated Diagnoses    Pulmonary embolism with infarction (H) [I26.99]  - Primary      Long term (current) use of anticoagulants [Z79.01]      History of pulmonary embolism [Z86.711]          Electronically signed

## 2025-06-25 ENCOUNTER — TELEPHONE (OUTPATIENT)
Dept: FAMILY MEDICINE | Facility: CLINIC | Age: 65
End: 2025-06-25
Payer: COMMERCIAL

## 2025-06-25 NOTE — TELEPHONE ENCOUNTER
Patient Quality Outreach    Patient is due for the following:   Hypertension -  Hypertension follow-up visit    Action(s) Taken:   Patient has upcoming appointment, these items will be addressed at that time.    Type of outreach:    Chart review performed, no outreach needed.    Questions for provider review:    None         Lacey Ernst MA  Chart routed to None.

## 2025-06-30 ENCOUNTER — TELEPHONE (OUTPATIENT)
Dept: ANTICOAGULATION | Facility: CLINIC | Age: 65
End: 2025-06-30

## 2025-06-30 NOTE — TELEPHONE ENCOUNTER
ANTICOAGULATION     Valentina Olmedo is overdue for an INR check.     Care Everywhere updated: yes    Left message for Amy home care nurse to call Anticoagulation clinic to discuss next INR check.     Kyleigh Jimenes RN  6/30/2025  Anticoagulation Clinic  [facility] for routing messages: raquel MATHUR  [facility] patient phone line: [phone]

## 2025-07-07 ENCOUNTER — OFFICE VISIT (OUTPATIENT)
Dept: PULMONOLOGY | Facility: CLINIC | Age: 65
End: 2025-07-07
Attending: INTERNAL MEDICINE
Payer: COMMERCIAL

## 2025-07-07 VITALS — HEART RATE: 82 BPM | OXYGEN SATURATION: 97 % | DIASTOLIC BLOOD PRESSURE: 81 MMHG | SYSTOLIC BLOOD PRESSURE: 127 MMHG

## 2025-07-07 DIAGNOSIS — R06.09 DYSPNEA ON EXERTION: ICD-10-CM

## 2025-07-07 DIAGNOSIS — G47.33 OSA (OBSTRUCTIVE SLEEP APNEA): Primary | ICD-10-CM

## 2025-07-07 DIAGNOSIS — E53.8 VITAMIN B12 DEFICIENCY (NON ANEMIC): ICD-10-CM

## 2025-07-07 DIAGNOSIS — E66.01 MORBID OBESITY (H): ICD-10-CM

## 2025-07-07 DIAGNOSIS — J45.31 MILD PERSISTENT ASTHMA WITH EXACERBATION: ICD-10-CM

## 2025-07-07 DIAGNOSIS — J30.2 SEASONAL ALLERGIC RHINITIS, UNSPECIFIED TRIGGER: ICD-10-CM

## 2025-07-07 PROCEDURE — 3074F SYST BP LT 130 MM HG: CPT | Performed by: INTERNAL MEDICINE

## 2025-07-07 PROCEDURE — 3079F DIAST BP 80-89 MM HG: CPT | Performed by: INTERNAL MEDICINE

## 2025-07-07 PROCEDURE — G2211 COMPLEX E/M VISIT ADD ON: HCPCS | Performed by: INTERNAL MEDICINE

## 2025-07-07 PROCEDURE — 99214 OFFICE O/P EST MOD 30 MIN: CPT | Performed by: INTERNAL MEDICINE

## 2025-07-07 RX ORDER — FLUTICASONE PROPIONATE 50 MCG
SPRAY, SUSPENSION (ML) NASAL
Qty: 32 G | Refills: 2 | Status: SHIPPED | OUTPATIENT
Start: 2025-07-07

## 2025-07-07 RX ORDER — PREDNISONE 20 MG/1
20 TABLET ORAL DAILY
Qty: 5 TABLET | Refills: 0 | Status: SHIPPED | OUTPATIENT
Start: 2025-07-07

## 2025-07-07 RX ORDER — LANOLIN ALCOHOL/MO/W.PET/CERES
1000 CREAM (GRAM) TOPICAL DAILY
Qty: 90 TABLET | Refills: 0 | Status: SHIPPED | OUTPATIENT
Start: 2025-07-07

## 2025-07-07 ASSESSMENT — ASTHMA QUESTIONNAIRES
QUESTION_2 LAST FOUR WEEKS HOW OFTEN HAVE YOU HAD SHORTNESS OF BREATH: MORE THAN ONCE A DAY
QUESTION_5 LAST FOUR WEEKS HOW WOULD YOU RATE YOUR ASTHMA CONTROL: SOMEWHAT CONTROLLED
QUESTION_1 LAST FOUR WEEKS HOW MUCH OF THE TIME DID YOUR ASTHMA KEEP YOU FROM GETTING AS MUCH DONE AT WORK, SCHOOL OR AT HOME: SOME OF THE TIME
QUESTION_4 LAST FOUR WEEKS HOW OFTEN HAVE YOU USED YOUR RESCUE INHALER OR NEBULIZER MEDICATION (SUCH AS ALBUTEROL): ONE OR TWO TIMES PER DAY
EMERGENCY_ROOM_LAST_YEAR_TOTAL: THREE OR MORE
QUESTION_3 LAST FOUR WEEKS HOW OFTEN DID YOUR ASTHMA SYMPTOMS (WHEEZING, COUGHING, SHORTNESS OF BREATH, CHEST TIGHTNESS OR PAIN) WAKE YOU UP AT NIGHT OR EARLIER THAN USUAL IN THE MORNING: TWO OR THREE NIGHTS A WEEK
ACT_TOTALSCORE: 11

## 2025-07-07 NOTE — PATIENT INSTRUCTIONS
Prednisone 20 mg daily for 5 days   SYMBICORT 160/4.5 two puffs twice a day, rinse your mouth with water after each use  Continue albuterol HFA or duonebs every six hours as needed  Continue autoCPAP machine 5-20 cmH2O  Avoid eating close to bedtime  Raise the head of the bed  Eat in the upright position   Continue nexium daily and increase to twice a day if persistent acid reflux symptoms  Referral to Physical therapy   Follow up 6 months

## 2025-07-08 ENCOUNTER — ANTICOAGULATION THERAPY VISIT (OUTPATIENT)
Dept: ANTICOAGULATION | Facility: CLINIC | Age: 65
End: 2025-07-08

## 2025-07-08 ENCOUNTER — LAB (OUTPATIENT)
Dept: LAB | Facility: CLINIC | Age: 65
End: 2025-07-08
Payer: COMMERCIAL

## 2025-07-08 ENCOUNTER — NURSE TRIAGE (OUTPATIENT)
Dept: FAMILY MEDICINE | Facility: CLINIC | Age: 65
End: 2025-07-08

## 2025-07-08 DIAGNOSIS — Z86.711 HISTORY OF PULMONARY EMBOLISM: ICD-10-CM

## 2025-07-08 DIAGNOSIS — I26.99 PULMONARY EMBOLISM WITH INFARCTION (H): Primary | ICD-10-CM

## 2025-07-08 DIAGNOSIS — Z79.01 LONG TERM (CURRENT) USE OF ANTICOAGULANTS: ICD-10-CM

## 2025-07-08 DIAGNOSIS — I26.99 PULMONARY EMBOLISM WITH INFARCTION (H): ICD-10-CM

## 2025-07-08 LAB — INR BLD: 1.1 (ref 0.9–1.1)

## 2025-07-08 PROCEDURE — 85610 PROTHROMBIN TIME: CPT

## 2025-07-08 PROCEDURE — 36416 COLLJ CAPILLARY BLOOD SPEC: CPT

## 2025-07-08 NOTE — TELEPHONE ENCOUNTER
General Call    Contacts       Contact Date/Time Type Contact Phone/Fax    07/08/2025 03:24 PM CDT Phone (Incoming) Valentina Olmedo (Self) [phone] (M)          Reason for Call:  Appointment    What are your questions or concerns:  Patient scheduled for 7/15 to be seen regarding symptoms, however would like to be seen by PCP sooner if there is an opening. Would also prefer to be seen by PCP even after the 15th (and would cancel current appt) as long as it's within a reasonable timeframe.    Date of last appointment with provider: 06/27/2025    Could we send this information to you in [employer] or would you prefer to receive a phone call?:   Patient would prefer a phone call   Okay to leave a detailed message?: Yes at Cell number on file:    Telephone Information:   Mobile [phone]

## 2025-07-08 NOTE — TELEPHONE ENCOUNTER
Left message to call back for: Valentina  Information to relay to patient:  Please transfer to RN for triage of diarrhea and swollen ankles.  Of note, Dr. Morin is out of the clinic until 7/18/25 and pending triage/disposition, will need to schedule with another provider for earlier appointment.    Patient is followed by [facility] RN for INR and did speak with patient today, 7/8/2025 (please see separate telephone encounter from 7/8/2025).       Per 7/15/25 appointment notes:            Paula Rollins RN  [facility]

## 2025-07-08 NOTE — TELEPHONE ENCOUNTER
Nurse Triage SBAR    Is this a 2nd Level Triage? NO    Situation: Patient calling back from the prior note, she has had some increase in ankle swelling for the past week. Also stated she had difficulty breathing the last 2 days.     Diarrhea has stopped   Background:   Was in the hospital end of may. And had diarrhea during that time.     Assessment:     Bilateral ankle swelling -patient stated it went down a little bit   1 week ago she first noticed it   Mild pain- she said it doesn't hurt that bad     Elevation helps a little bit     Had swollen legs before     Denies any chest pain, SOB or fever or redness in the ankles     Patient states that she has had difficulty breathing the last 2 days-         Protocol Recommended Disposition:   Go to ED Now    Recommendation: recommended that patient got to the Emergency department tonight and patient was agreeable with that.  Did schedule a visit with Dr. Starr on 7/18/25 for a hospital/ed follow up.       Does the patient meet one of the following criteria for ADS visit consideration? No    Reason for Disposition   Difficulty breathing    Additional Information   Negative: Chest Pain   Negative: Followed an ankle injury   Negative: Followed a bee sting and has localized swelling (e.g., small area of puffy or swollen skin)   Negative: Followed an insect bite and has localized swelling (e.g., small area of puffy or swollen skin)   Negative: Ankle pain is main symptom   Negative: Swelling of calf or leg is main symptom   Negative: Leg swelling (e.g., ankle swelling extends up to shins or knees)    Protocols used: Ankle Swelling-A-OH

## 2025-07-08 NOTE — PROGRESS NOTES
ANTICOAGULATION MANAGEMENT     Valentina Olmedo 65 year old female is on warfarin with subtherapeutic INR result. (Goal INR 2.0-3.0)    Recent labs: (last 7 days)     07/08/25  1401   INR 1.1       ASSESSMENT     Source(s): Chart Review and Patient/Caregiver Call     Warfarin doses taken: Warfarin taken as instructed-says she is not missing doses but does take them late sometimes.  Diet: Very poor appetite. Not eating much due to nausea and diarrhea. Mostly eating cereal when she can eat. Started drinking two ensure daily last week; plans to stick with this.  Medication/supplement changes: None noted  New illness, injury, or hospitalization: GI issues are fairly new. Not improving.   Signs or symptoms of bleeding or clotting: No  Previous result: Subtherapeutic  Additional findings: She never had follow up appointment with provider two weeks ago so I strongly encouraged her to make an appointment with Dr. Morin or another provider to discuss her ongoing GI issues.       PLAN     Recommended plan for ongoing change(s) affecting INR     Dosing Instructions: Increase your warfarin dose (15.6% change) with next INR in 1 week (no boost dose because we moved 15 mg day from Monday/Thursday to Tuesday/Friday so she is getting 15 mg two days in a row)       Summary  As of 7/8/2025      Full warfarin instructions:  15 mg every Tue, Fri; 12.5 mg all other days   Next INR check:  7/15/2025               Telephone call with Valentina who agrees to plan and repeated back plan correctly    Lab visit scheduled    Education provided: Please call back if any changes to your diet, medications or how you've been taking warfarin  Taking warfarin: prescribed tablet strength and color  Goal range and lab monitoring: goal range and significance of current result, Importance of therapeutic range, and Importance of following up at instructed interval  Dietary considerations: Impact of protein intake on INR   Symptom monitoring: monitoring for  bleeding signs and symptoms and monitoring for clotting signs and symptoms  Contact [phone] with any changes, questions or concerns.     Plan made per [facility] anticoagulation protocol    Rosalva Eckert RN  7/8/2025  Anticoagulation Clinic  [facility] for routing messages: raquel ARAYA  [facility] patient phone line: [phone]        _______________________________________________________________________     Anticoagulation Episode Summary       Current INR goal:  2.0-3.0   TTR:  49.0% (11.5 mo)   Target end date:  Indefinite   Send INR reminders to:  PIPO ARAYA    Indications    Pulmonary embolism with infarction (H) [I26.99]  Long term (current) use of anticoagulants [Z79.01]  History of pulmonary embolism [Z86.711]             Comments:  --             Anticoagulation Care Providers       Provider Role Specialty Phone number    Promise Vanegas MD Referring Family Medicine [phone]    Donald Rooney MD Referring Internal Medicine [phone]    Jose Maria Morin MD Referring Family Medicine [phone]

## 2025-07-09 ENCOUNTER — TELEPHONE (OUTPATIENT)
Dept: SLEEP MEDICINE | Facility: CLINIC | Age: 65
End: 2025-07-09
Payer: COMMERCIAL

## 2025-07-09 NOTE — PROGRESS NOTES
PULMONARY OUTPATIENT FOLLOW UP NOTE      Assessment:      Mild persistent asthma with acute exacerbation  PFT showed normal spirometry, lung volumes and diffusion capacity.   Systemic steroids.   Continue ICS/LABA. Albuterol HFA as needed  Allergic rhinitis   Nasal steroid, H1 blocker as needed  GERD  Avoid eating close to bedtime. Raise the head of the bed  Continue nexium daily and increase to twice a day if persistent acid reflux symptoms.   Sleep apnea   PSG ( 2/3/2021) AHI 91, SpO2 sheri 78.6%, 9.2 minutes <= 88%. .   Continue AutoCPAP 5-20 cmH20  History of pulmonary embolism   On long term anticoagulation   Physical deconditioning   Referral to pulmonary rehabilitation   Previous tobacco use  Significant tobacco use in the past, 1/2 ppd for 45 years, quit February 2023.   Morbid obesity , BMI 57       Plan:      Prednisone 20 mg daily for 5 days   SYMBICORT 160/4.5 two puffs twice a day, rinse your mouth with water after each use  Continue albuterol HFA or duonebs every six hours as needed  Continue autoCPAP machine 5-20 cmH2O  Avoid eating close to bedtime  Raise the head of the bed  Eat in the upright position   Continue nexium daily and increase to twice a day if persistent acid reflux symptoms  Referral to Physical therapy   Follow up 6 months     Sen Balderas MD  Pulmonary Medicine  7/7/2025    DME (Durable Medical Equipment) Orders and Documentation  Supplies for CPAP machine     The patient was assessed and it was determined the patient is in need of the following listed DME Supplies/Equipment. Please complete supporting documentation below to demonstrate medical necessity.      DME All Other Item(s) Documentation    List reason for need and supporting documentation for medical necessity below for each DME item.     1. Severe sleep apnea  2. Morbid Obesity BMI 57       CC:     Chief Complaint   Patient presents with    Follow Up     Dyspnea on exertion  Mild persistent asthma without  complication  CHRIS (obstructive sleep apnea)  Severe muscle deconditioning         HPI:      Valentina Olmedo is a 65 year old female who presents for follow up  Patient has history of HTN, hypothyroidism, asthma and allergic rhinitis, GERD, schizoaffective disorder, anxiety disorder, PTSD, history of pulmonary embolism anticoagulated, CHRIS, morbid obesity BMI 56, previous tobacco user.   Patient was seen in ED on 4/25/2025 for evaluation of shortness of breath. CT scan showed no pulmonary embolism, dependent atelectasis. Treated with steroids and Abx for COPD exacerbation and acute bronchitis.   Hospitalized from 5/28 to 6/12/2025 for evaluation of depression and suicidal ideation.   Doing better after hospitalization.   Limited mobility, uses wheelchair to get around, walker for short distance.  Reports dyspnea, mild dry cough, occasional wheezes, chest tightness.   Uses duonebs 4 times a day.   Denies chest pain, orthopnea or PND, no swelling of LEs.   Denies fever, chills or night sweats.   Mild postnasal drip, denies headaches, or sinus tenderness. Uses nasal steroid, H1 blocker.   Acid reflux symptoms daily , takes nexium   Patient is on long term anticoagulation for history of pulmonary embolism.  Uses CPAP machine at night.     Past Medical History :     Past Medical History:   Diagnosis Date    Adrenal mass 10/12/2015    Chen Null MD  They were incidentally discovered on the CAT scan of the abdomen from December 2011 which was done for evaluation of kidney stones. F/up CT of the abdomen done on 6/26/12 showed a stable 5 mm nodular opacity in the right lower lung lobe, adjacent to the diagram. Bilateral adrenal masses average density measured less than 0 Hounsfield units, consistent with benign etio    Age-related cataract of both eyes, unspecified age-related cataract type 03/27/2024    Anxiety     Anxiety     Arthritis     Borderline personality disorder (H)     Cancer (H)     COPD (chronic  obstructive pulmonary disease) (H)     Depression     Depressive disorder     History of COVID-19 4/19/2021    Hyperlipidemia     Hypertension     Hypertension     Hyponatremia     Hypothyroidism     Malignant neoplasm of thyroid gland (H)     Chen Null MD  She had R hemithyroidectomy for a right neck tumor in 1994. According to the patient, the tumor was benign. She is not sure whether or not the tumor belonged to the thyroid gland. The surgery was done here at the [facility]. In January 2011, she was diagnosed with kidney stones. She was found to have an elevated calcium level of 11.7, with a PTH level of 368. Stone an    Primary hyperparathyroidism           PTSD (post-traumatic stress disorder)     Pulmonary embolism with infarction (H) 01/12/2021    Recurrent otitis media     Seizure disorder (H)     Sleep apnea     cpap at [facility]    Stroke (H)     Tobacco abuse 9/29/2015    Vertigo         Medications:     Current Outpatient Medications   Medication Sig Dispense Refill    acetaminophen (TYLENOL) 500 MG tablet Take 1-2 tablets (500-1,000 mg) by mouth every 8 hours as needed for mild pain. 120 tablet 1    albuterol (PROAIR HFA) 108 (90 Base) MCG/ACT inhaler Inhale 2 puffs into the lungs every 4 hours as needed for shortness of breath or wheezing. 18 g 5    albuterol (PROVENTIL) (2.5 MG/3ML) 0.083% neb solution Take 1 vial (2.5 mg) by nebulization every 6 hours as needed for shortness of breath, wheezing or cough. 90 mL 0    ARIPiprazole (ABILIFY) 20 MG tablet Take 1 tablet (20 mg) by mouth every morning. 30 tablet 1    benztropine (COGENTIN) 1 MG tablet Take 1 tablet (1 mg) by mouth 2 times daily. 60 tablet 1    bisacodyl (DULCOLAX) 5 MG EC tablet Take 1 tablet (5 mg) by mouth daily as needed for constipation. 30 tablet 0    Brivaracetam (BRIVIACT) 100 MG tablet Take 150 mg by mouth 2 times daily       budesonide-formoterol (SYMBICORT/BREYNA) 160-4.5 MCG/ACT inhaler Inhale 2 puffs into the lungs 2  times daily. 10.2 g 2    calcium carbonate (TUMS) 500 MG chewable tablet Take 1 tablet (500 mg) by mouth 2 times daily as needed for heartburn. 60 tablet 0    carBAMazepine (TEGRETOL) 200 MG tablet 200mg in the AM 400mg at lunch and 200mg at HS      cyanocobalamin (VITAMIN B-12) 1000 MCG tablet TAKE 1 TABLET BY MOUTH DAILY 90 tablet 0    denosumab (PROLIA) 60 MG/ML SOSY injection Inject 60 mg Subcutaneous once Per pt report      DULoxetine (CYMBALTA) 60 MG capsule Take 2 capsules (120 mg) by mouth daily. 180 capsule 2    EPINEPHrine (ANY BX GENERIC EQUIV) 0.3 MG/0.3ML injection 2-pack Inject 0.3 mLs (0.3 mg) into the muscle as needed for anaphylaxis 2 each 1    esomeprazole (NEXIUM) 40 MG DR capsule Take 1 capsule (40 mg) by mouth every morning (before breakfast). Take 30-60 minutes before eating. 30 capsule 11    fluticasone (FLONASE) 50 MCG/ACT nasal spray INSTILL 1 SPRAY IN EACH NOSTRIL TWICE A DAY 32 g 2    levothyroxine (SYNTHROID/LEVOTHROID) 300 MCG tablet TAKE 1 TABLET BY MOUTH DAILY 28 tablet 12    LORazepam (ATIVAN) 0.5 MG tablet Take 1 tablet (0.5 mg) by mouth every 6 hours as needed for anxiety. 15 tablet 0    melatonin 3 MG tablet Take 1 tablet (3 mg) by mouth nightly as needed for sleep. 90 tablet 4    ondansetron (ZOFRAN ODT) 4 MG ODT tab Take 1 tablet (4 mg) by mouth every 8 hours as needed for nausea. 30 tablet 1    prazosin (MINIPRESS) 2 MG capsule Take 1 capsule (2 mg) by mouth at bedtime. 90 capsule 2    predniSONE (DELTASONE) 20 MG tablet Take 1 tablet (20 mg) by mouth daily. 5 tablet 0    pregabalin (LYRICA) 25 MG capsule Take 1 capsule (25 mg) by mouth 2 times daily. 56 capsule 5    QUEtiapine Fumarate 150 MG TABS Take 1 tablet by mouth at bedtime. 30 tablet 2    rosuvastatin (CRESTOR) 20 MG tablet TAKE 1 TABLET BY MOUTH DAILY 90 tablet 1    SENNA-TIME 8.6 MG tablet TAKE 2 TABLETS (17.2MG) BY MOUTH TWICE A  tablet 11    sertraline (ZOLOFT) 50 MG tablet Take 1 tablet (50 mg) by mouth  daily. 30 tablet 1    SUMAtriptan (IMITREX) 50 MG tablet Take 1 tablet (50 mg) by mouth at onset of headache for migraine May repeat in 2 hours. Max 4 tablets/24 hours. 12 tablet 3    traZODone (DESYREL) 50 MG tablet Take 1 tablet (50 mg) by mouth nightly as needed for sleep (may repeat after 60 minutes). 30 tablet 1    vitamin D3 (CHOLECALCIFEROL) 50 mcg (2000 units) tablet Take 2 tablets (100 mcg) by mouth daily. 180 tablet 3    warfarin ANTICOAGULANT (COUMADIN/JANTOVEN) 5 MG tablet Take 15 mg by mouth every Monday and Thursday; take 10 mg by mouth once daily on all other days. 204 tablet 0    enoxaparin ANTICOAGULANT (LOVENOX) 60 MG/0.6ML syringe Inject 0.6 mLs (60 mg) subcutaneously every 12 hours. 36 mL 0     Current Facility-Administered Medications   Medication Dose Route Frequency Provider Last Rate Last Admin    denosumab (PROLIA) injection 60 mg  60 mg Subcutaneous Q6 Months    60 mg at 11/14/24 1329       Social History :     Social History     Socioeconomic History    Marital status:      Spouse name: Not on file    Number of children: Not on file    Years of education: Not on file    Highest education level: Not on file   Occupational History    Not on file   Tobacco Use    Smoking status: Every Day     Types: Cigarettes     Passive exposure: Never    Smokeless tobacco: Never    Tobacco comments:     Quit about 5 days ago per pt   Vaping Use    Vaping status: Never Used   Substance and Sexual Activity    Alcohol use: No     Comment: None.    Drug use: No    Sexual activity: Not Currently     Partners: Male   Other Topics Concern    Not on file   Social History Narrative    Not on file     Social Drivers of Health     Financial Resource Strain: Low Risk  (5/29/2025)    Financial Resource Strain     Within the past 12 months, have you or your family members you live with been unable to get utilities (heat, electricity) when it was really needed?: No   Food Insecurity: Low Risk  (5/29/2025)    Food  Insecurity     Within the past 12 months, did you worry that your food would run out before you got money to buy more?: No     Within the past 12 months, did the food you bought just not last and you didn t have money to get more?: No   Transportation Needs: Low Risk  (5/29/2025)    Transportation Needs     Within the past 12 months, has lack of transportation kept you from medical appointments, getting your medicines, non-medical meetings or appointments, work, or from getting things that you need?: No   Physical Activity: Not on file   Stress: Not on file   Social Connections: Not on file   Interpersonal Safety: High Risk (5/29/2025)    Interpersonal Safety     Do you feel physically and emotionally safe where you currently live?: No     Within the past 12 months, have you been hit, slapped, kicked or otherwise physically hurt by someone?: No     Within the past 12 months, have you been humiliated or emotionally abused in other ways by your partner or ex-partner?: Yes   Housing Stability: High Risk (5/29/2025)    Housing Stability     Do you have housing? : Yes     Are you worried about losing your housing?: Yes          Family History :     Family History   Problem Relation Age of Onset    Chronic Obstructive Pulmonary Disease Mother     Other - See Comments Father         estranged    Skin Cancer Father     Lung Cancer Maternal Grandfather 76    Other - See Comments Brother         Missing since 1992    Alcoholism Brother     Diabetes Sister     Depression Sister     Alcoholism Sister     No Known Problems Sister         Half sister       Review of Systems  A 12 point comprehensive review of systems was negative except as noted.        Objective:     /81   Pulse 82   LMP  (LMP Unknown)   SpO2 97%     Gen: obese, awake, alert, no distress, sitting in a wheelchair  HEENT: pink conjunctiva, moist mucosa, Mallampati III/IV  Neck: no thyromegaly, masses or JVD  Lungs: decrease breath sounds at the bases,  faint ronchi  CV: regular, no murmurs or gallops appreciated  Abdomen: soft, NT, BS wnl  Ext: no edema  Neuro: CN II-XII intact, non focal          Diagnostic tests:      Echocardiogram 4/2017  1. Normal left ventricular size and systolic performance with a visually estimated ejection fraction of 60-65%.   2. No significant valvular heart disease is identified on this study.   3. Probable normal right ventricular size and systolic performance though right-sided structures are not clearly visualized on all views on this study.   4. There is mild left atrial enlargement.   5. Right ventricular systolic pressure cannot be directly estimated from TR velocities due to insufficient tricuspid regurgitation. However, there are no indirect findings such as abnormal RV volume/geometry, altered pulmonary flow velocity profile, or leftward septal displacement to suggest moderate or severe pulmonary hypertension.      When compared to the prior real-time echocardiogram dated 29 July 2014, there has been little appreciable interval change.     Nuc Med Stress Test 06/2021:  SUMMARY:  1.  Subjectively positive regadenoson infusion.  2.  Negative regadenoson ECG for ischemia.  3.  Regadenoson nuclear imaging does not suggest any ischemia or prior scar.  4.  Preserved wall motion as mentioned above.  5.  No prior scan is available for comparison.    PFTs:     FVC  2.78 L (82%)  FEV1 2.25 L (85%)  FEV1/FVC 81  TLC 4.87 L (92%)  RV 1.71 L (84%)  DLCO 94%         Sleep study:      Sleep study done on 3/26/2015 showed decrease sleep time, AHI 1.8, per sleep physician, underestimated secondary to no stage REM sleep, significant hypoxia was note present.     PSG ( 2/3/2021) AHI 91, SpO2 sheri 78.6%, 9.2 minutes <= 88%.   AutoCPAP.     IMAGES:     CT CHEST PULMONARY EMBOLISM W CONTRAST  LOCATION: [facility]  DATE/TIME: 8/31/2021 2:17 PM   INDICATION: Dyspnea, chronic, unclear etiology.  COMPARISON:  None.  FINDINGS:  ANGIOGRAM CHEST: Pulmonary arteries are normal caliber and negative for pulmonary emboli. Thoracic aorta is negative for dissection or aneurysm.  LUNGS AND PLEURA: Minimal atelectasis or scarring. Otherwise, lungs are clear. No significant airway thickening or bronchiectasis. No pleural effusion or pneumothorax.  MEDIASTINUM/AXILLAE: No adenopathy. No pericardial effusion. Small hiatus hernia.  CORONARY ARTERY CALCIFICATION: None.  UPPER ABDOMEN: Hepatic steatosis. Stable 2.5 cm left adrenal adenoma requiring no routine follow-up.  MUSCULOSKELETAL: Normal.  IMPRESSION:  1.  No findings to explain symptoms.  2.  No pulmonary embolism.  3.  Hepatic steatosis.  4.  Small hiatus hernia.     XR CHEST 2 VW  LOCATION: [facility]  DATE/TIME: 12/9/2021 10:34 AM  INDICATION: Chronic cough and left chest wall pain. History of COPD.  COMPARISON: CT chest 8/31/2021 and one view chest 9/9/2019  IMPRESSION: Heart size and vascularity are normal. Focal subpleural airspace opacities within the left mid lung and lower lobe suggesting pneumonia. No pneumothorax nor pleural effusion.  Follow-up radiograph should be suggested to ensure resolution.    CT ABDOMEN PELVIS W CONTRAST  DATE/TIME: 5/7/2022 1:16 PM  INDICATION:  Pelvic and perineal pain.  COMPARISON: CT abdomen 05/23/2013.  FINDINGS:   LOWER CHEST: Normal.  HEPATOBILIARY: No acute abnormality. A hypodense nodule suggesting hemangioma is stable in size measuring 1.8 cm posterior right hepatic dome series 3 image 27. Gallbladder appears unremarkable.  PANCREAS: Normal.  SPLEEN: No acute abnormality. Adjacent splenule is again noted.  ADRENAL GLANDS: Hypodense nodule is 2.8 cm right adrenal, previously 1.8 cm series 3 image 55. Normal left adrenal.  KIDNEYS/BLADDER: No hydronephrosis or urolithiasis. No acute abnormality. Bladder is unremarkable.  BOWEL: No obstruction. Normal appendix. No acute inflammatory change.  LYMPH NODES:  Normal.  VASCULATURE: Unremarkable.  PELVIC ORGANS: Hysterectomy. No perineal abscess. No convincing acute abnormality.   MUSCULOSKELETAL: Height loss at L1, L2, and L5 vertebral bodies noted.  IMPRESSION:   1.  No acute abnormality is seen. No perineal or pelvic fluid collection identified.  2.  Larger size of a right adrenal nodule compared to 05/23/2013. This is technically indeterminate. This could be further correlated with adrenal specific CT or MRI.  3.  Height loss noted at L1, L2, and L5 vertebral bodies.    XR CHEST 2 VIEWS  DATE: 5/30/2024   INDICATION: SOB  COMPARISON: None.  IMPRESSION: The lungs are well-inflated which may indicate obstructive lung disease. No infiltrate or pleural effusion. The heart is normal in size. No pulmonary vascular congestion. There is a thoracic kyphosis with moderate degenerative change.    CT ANGIO CHEST W IV CONT PE STUDY   LOCATION: [facility]   DATE: 4/25/2025  INDICATION: PE, sob   COMPARISON: 4/4/2025   FINDINGS:   ANGIOGRAM CHEST: Poor opacification of the pulmonary arteries, nondiagnostic evaluation for pulmonary emboli, as above. Thoracic aorta is negative for dissection. No CT evidence of right heart strain.  LUNGS AND PLEURA: Minimal dependent atelectasis.  MEDIASTINUM/AXILLAE: Normal.  CORONARY ARTERY CALCIFICATION: None.  UPPER ABDOMEN: Unchanged benign right adrenal adenoma.  MUSCULOSKELETAL: Multilevel degenerative changes in the spine. Unchanged L1 compression deformity.   IMPRESSION:   1. Nondiagnostic evaluation for pulmonary emboli due to poor opacification of the pulmonary arteries.   2.  No acute findings.     XR CHEST PORT 1 VIEW  LOCATION: [facility]  DATE: 5/27/2025  INDICATION: Chest pain.  COMPARISON: Chest radiograph 5/19/2025                                        IMPRESSION:   Lungs are clear. No focal airspace opacities, pleural effusions, or pneumothorax. Normal pulmonary vascularity.  Stable, nonenlarged  cardiac silhouette.

## 2025-07-10 ENCOUNTER — TELEPHONE (OUTPATIENT)
Dept: PULMONOLOGY | Facility: CLINIC | Age: 65
End: 2025-07-10
Payer: COMMERCIAL

## 2025-07-10 NOTE — TELEPHONE ENCOUNTER
DATE:  07/10/205  DME PROVIDER:  Jaime FARIA   SUPPLY ORDERED:  Cpap/supplies   PROVIDER:  Shawnee bui MD    COMMENT:  Faxed:  Cover sheet   Cpap/supplies order    All the above faxed to [facility] at :   [phone].         Aric Hwang MA

## 2025-07-16 DIAGNOSIS — M48.02 CERVICAL STENOSIS OF SPINAL CANAL: ICD-10-CM

## 2025-07-16 DIAGNOSIS — E78.2 MIXED HYPERLIPIDEMIA: ICD-10-CM

## 2025-07-16 DIAGNOSIS — G47.00 INSOMNIA, UNSPECIFIED TYPE: ICD-10-CM

## 2025-07-16 DIAGNOSIS — F33.1 MAJOR DEPRESSIVE DISORDER, RECURRENT EPISODE, MODERATE (H): ICD-10-CM

## 2025-07-16 DIAGNOSIS — F25.1 SCHIZOAFFECTIVE DISORDER, DEPRESSIVE TYPE (H): ICD-10-CM

## 2025-07-16 RX ORDER — TRAZODONE HYDROCHLORIDE 50 MG/1
50 TABLET ORAL
Qty: 30 TABLET | Refills: 1 | OUTPATIENT
Start: 2025-07-16

## 2025-07-16 RX ORDER — BENZTROPINE MESYLATE 1 MG/1
1 TABLET ORAL 2 TIMES DAILY
Qty: 60 TABLET | Refills: 1 | OUTPATIENT
Start: 2025-07-16

## 2025-07-16 RX ORDER — PREGABALIN 25 MG/1
25 CAPSULE ORAL 2 TIMES DAILY
Qty: 20 CAPSULE | Refills: 0 | Status: CANCELLED | OUTPATIENT
Start: 2025-07-16 | End: 2025-07-26

## 2025-07-16 RX ORDER — ARIPIPRAZOLE 20 MG/1
20 TABLET ORAL EVERY MORNING
Qty: 30 TABLET | Refills: 0 | Status: CANCELLED | OUTPATIENT
Start: 2025-07-16

## 2025-07-16 NOTE — TELEPHONE ENCOUNTER
Medication Question or Refill        What medication are you calling about (include dose and sig)?: ARIPiprazole (ABILIFY) 20 MG tablet,     pregabalin (LYRICA) 25 MG capsule     traZODone (DESYREL) 50 MG tablet   benztropine (COGENTIN) 1 MG tablet        Preferred Pharmacy:   [id number] - [place], [place] - [address],  [address]  [address],  [address]  [facility] [address]  Phone: [phone] Fax: [phone]      Controlled Substance Agreement on file:   CSA -- Patient Level:    CSA: None found at the patient level.       Who prescribed the medication?: PCP    Do you need a refill? Yes    When did you use the medication last? 7/16    Patient offered an appointment? No    Do you have any questions or concerns?  Yes: Pt is out of meds      Could we send this information to you in [facility] or would you prefer to receive a phone call?:   Patient would prefer a phone call   Okay to leave a detailed message?: Yes at Cell number on file:    Telephone Information:   Mobile [phone]

## 2025-07-16 NOTE — TELEPHONE ENCOUNTER
This is a pharmacy change request, pended medication for review by the provider.     Lasted ordered by discharge provider during [facility] hospital admission 5/28/25-6/12/25.    Tess JENKINS RN

## 2025-07-17 RX ORDER — PREGABALIN 25 MG/1
25 CAPSULE ORAL 2 TIMES DAILY
Qty: 56 CAPSULE | Refills: 5 | Status: SHIPPED | OUTPATIENT
Start: 2025-07-17

## 2025-07-17 RX ORDER — ARIPIPRAZOLE 20 MG/1
20 TABLET ORAL EVERY MORNING
Qty: 30 TABLET | Refills: 1 | Status: SHIPPED | OUTPATIENT
Start: 2025-07-17

## 2025-07-17 RX ORDER — TRAZODONE HYDROCHLORIDE 50 MG/1
50 TABLET ORAL
Qty: 30 TABLET | Refills: 1 | Status: SHIPPED | OUTPATIENT
Start: 2025-07-17

## 2025-07-17 RX ORDER — BENZTROPINE MESYLATE 1 MG/1
1 TABLET ORAL 2 TIMES DAILY
Qty: 60 TABLET | Refills: 1 | Status: SHIPPED | OUTPATIENT
Start: 2025-07-17

## 2025-07-17 NOTE — CONFIDENTIAL NOTE
Message states that patient was out of medications.  She should have a psychiatrist     Please call and clarify  (the patient may not know).

## 2025-07-18 ENCOUNTER — TELEPHONE (OUTPATIENT)
Dept: FAMILY MEDICINE | Facility: CLINIC | Age: 65
End: 2025-07-18

## 2025-07-18 NOTE — TELEPHONE ENCOUNTER
There is a question of whether or not home care services have been initiated with patient.     Per 6/23 telephone encounter, [facility] is seeing for nursing.     Call to [facility] to verify that they will continue to see patient or status of her current home care episode. No answer,  left requesting return call.

## 2025-07-21 ENCOUNTER — PATIENT OUTREACH (OUTPATIENT)
Dept: CARE COORDINATION | Facility: CLINIC | Age: 65
End: 2025-07-21
Payer: COMMERCIAL

## 2025-07-21 NOTE — TELEPHONE ENCOUNTER
Writer notified Valentina of update below. She states she just applied for medical assistance. [facility] updated as well per request of patient.

## 2025-07-21 NOTE — LETTER
[facility] CARE COORDINATION  [address]  [place] [address]    July 22, 2025    Valentina Olmedo  [address]   [place] [address]      Dear Valentina,    I am a clinic community health worker who works with Jose Maria Morin MD with the [facility]. I have been trying to reach you recently to introduce Clinic Care Coordination. Below is a description of clinic care coordination and how we can further assist you.       The clinic care coordination team is made up of a registered nurse, , financial resource worker and community health worker who understand the health care system. The goal of clinic care coordination is to help you manage your health and improve access to the health care system. Our team works alongside your provider to assist you in determining your health and social needs. We can help you obtain health care and community resources, providing you with necessary information and education. We can work with you through any barriers and develop a care plan that helps coordinate and strengthen the communication between you and your care team.  Our services are voluntary and are offered without charge to you personally.    Please feel free to contact Edita at [phone] with any questions or concerns regarding care coordination and what we can offer.      We are focused on providing you with the highest-quality healthcare experience possible.    Sincerely,     DAGOBERTO Vela  Connected Care Resource Center  [facility]

## 2025-07-21 NOTE — TELEPHONE ENCOUNTER
[facility] called back. They have not started seeing the pt due to issue with insurance. Once the insurance is opened up again, they will start services.  from [facility] is aware and is helping pt navigate this.

## 2025-07-21 NOTE — PROGRESS NOTES
Clinic Care Coordination Contact  [facility]/Voicemail    Clinical Data: Care Coordinator Outreach    Outreach Documentation Number of Outreach Attempt   7/21/2025   8:50 AM 1       Left message on patient's voicemail with call back information and requested return call.      Plan: Care Coordinator will try to reach patient again in 10 business days.    DAGOBERTO Garcia   [phone]  Clinic Care Coordination  [facility]

## 2025-07-22 NOTE — PROGRESS NOTES
Clinic Care Coordination Contact  [facility]/Voicemail    Clinical Data: Care Coordinator Outreach    Outreach Documentation Number of Outreach Attempt   7/21/2025   8:50 AM 1   7/22/2025  12:59 PM 2       Left message on patient's voicemail with call back information and requested return call. CHW also left the numbers for the  [facility] and Senior Linkage Line.     Jose [phone]  Senior Linkage Line [phone]      Plan: Care Coordinator will send care coordination introduction letter with care coordinator contact information and explanation of care coordination services via [employer]. Care Coordinator will do no further outreaches at this time.    DAGOBERTO Vela  [phone]  [facility] Care [facility]

## 2025-07-30 ENCOUNTER — TELEPHONE (OUTPATIENT)
Dept: PULMONOLOGY | Facility: CLINIC | Age: 65
End: 2025-07-30
Payer: COMMERCIAL

## 2025-07-30 ENCOUNTER — TELEPHONE (OUTPATIENT)
Dept: FAMILY MEDICINE | Facility: CLINIC | Age: 65
End: 2025-07-30
Payer: COMMERCIAL

## 2025-07-30 NOTE — TELEPHONE ENCOUNTER
"Home Health Care - FYI/Status Update    Reason for call:  AMPARO Greco with [facility] calling with FYI/update on patient.      Patient's insurance was inactive from 6/12/25 (when they received home care referral) until 7/23/25, so unable to start services for her at that time.  HC agency attempted unsuccessfully to reach patient several times to schedule SOC visit after insurance was reactivated 7/23.  Yesterday finally reached patient and spouse and was informed patient was having a mental health crisis.  Per spouse, Valentina was \"bawling uncontrollably, couldn't articulate and not making any sense.\"   was trying to get Valentina to the hospital for mental health crisis.  RNCM unable to reach patient/spouse last night and spouse didn't call nurse back.      This RN informed Angus patient was seen at [facility] yesterday, 7/29/25 and was transferred to [facility] for admission via EMS.      RNCM stated she would call patient's [facility] to notify them of patient's admission.  Once HC receives notification of hospital discharge from [facility], HC will reach out to patient to schedule SOC visit.        No call back needed.    Pt Provider: Dr. Morin     Phone Number Homecare Nurse can be reached at: [phone].        Paula Rollins RN  [facility]       "

## 2025-07-30 NOTE — TELEPHONE ENCOUNTER
DATE:  7/30/25  DME PROVIDER:  [facility]  SUPPLY ORDERED:  CPAP SUPPLIES  PROVIDER:  ALEXANDRA HOGUE MD    COMMENT:  3:48 PM  *Cover sheet  *Face sheet  *CPAP/ Supplies order  *Office notes    Per [facility], patient get PAP supplies through [facility]. All the above faxed to [facility] at [phone].    David REID CMA, M [facility]

## 2025-08-01 ENCOUNTER — RESULTS FOLLOW-UP (OUTPATIENT)
Dept: ANTICOAGULATION | Facility: CLINIC | Age: 65
End: 2025-08-01

## 2025-08-04 ENCOUNTER — TELEPHONE (OUTPATIENT)
Dept: FAMILY MEDICINE | Facility: CLINIC | Age: 65
End: 2025-08-04
Payer: COMMERCIAL

## 2025-08-04 DIAGNOSIS — Z79.01 LONG TERM (CURRENT) USE OF ANTICOAGULANTS: ICD-10-CM

## 2025-08-04 DIAGNOSIS — Z86.711 HISTORY OF PULMONARY EMBOLISM: ICD-10-CM

## 2025-08-04 DIAGNOSIS — I26.99 PULMONARY EMBOLISM WITH INFARCTION (H): Primary | ICD-10-CM

## 2025-08-06 ENCOUNTER — MEDICAL CORRESPONDENCE (OUTPATIENT)
Dept: HEALTH INFORMATION MANAGEMENT | Facility: CLINIC | Age: 65
End: 2025-08-06
Payer: COMMERCIAL

## 2025-08-11 ENCOUNTER — DOCUMENTATION ONLY (OUTPATIENT)
Dept: OTHER | Facility: CLINIC | Age: 65
End: 2025-08-11
Payer: COMMERCIAL

## 2025-08-11 DIAGNOSIS — Z53.9 DIAGNOSIS NOT YET DEFINED: Primary | ICD-10-CM

## 2025-08-11 PROCEDURE — G0180 MD CERTIFICATION HHA PATIENT: HCPCS | Performed by: FAMILY MEDICINE

## 2025-08-12 ENCOUNTER — ANTICOAGULATION THERAPY VISIT (OUTPATIENT)
Dept: ANTICOAGULATION | Facility: CLINIC | Age: 65
End: 2025-08-12
Payer: COMMERCIAL

## 2025-08-12 DIAGNOSIS — I26.99 PULMONARY EMBOLISM WITH INFARCTION (H): Primary | ICD-10-CM

## 2025-08-12 DIAGNOSIS — Z79.01 LONG TERM (CURRENT) USE OF ANTICOAGULANTS: ICD-10-CM

## 2025-08-12 DIAGNOSIS — Z86.711 HISTORY OF PULMONARY EMBOLISM: ICD-10-CM

## 2025-08-12 LAB — INR (EXTERNAL): 3.4

## 2025-08-15 PROBLEM — M54.16 LUMBAR RADICULOPATHY: Status: ACTIVE | Noted: 2025-08-15

## 2025-08-15 PROBLEM — Z87.448 HISTORY OF CHRONIC KIDNEY DISEASE: Status: ACTIVE | Noted: 2025-05-28

## 2025-08-18 ENCOUNTER — ANTICOAGULATION THERAPY VISIT (OUTPATIENT)
Dept: ANTICOAGULATION | Facility: CLINIC | Age: 65
End: 2025-08-18
Payer: COMMERCIAL

## 2025-08-18 DIAGNOSIS — Z79.01 LONG TERM (CURRENT) USE OF ANTICOAGULANTS: ICD-10-CM

## 2025-08-18 DIAGNOSIS — Z86.711 HISTORY OF PULMONARY EMBOLISM: ICD-10-CM

## 2025-08-18 DIAGNOSIS — I26.99 PULMONARY EMBOLISM WITH INFARCTION (H): Primary | ICD-10-CM

## 2025-08-18 LAB — INR (EXTERNAL): 4.9 (ref 0.9–1.1)

## 2025-08-23 ENCOUNTER — ANCILLARY PROCEDURE (OUTPATIENT)
Dept: GENERAL RADIOLOGY | Facility: CLINIC | Age: 65
End: 2025-08-23
Attending: STUDENT IN AN ORGANIZED HEALTH CARE EDUCATION/TRAINING PROGRAM
Payer: COMMERCIAL

## 2025-08-23 ENCOUNTER — OFFICE VISIT (OUTPATIENT)
Dept: ORTHOPEDICS | Facility: CLINIC | Age: 65
End: 2025-08-23
Payer: COMMERCIAL

## 2025-08-23 DIAGNOSIS — M25.552 LATERAL PAIN OF LEFT HIP: ICD-10-CM

## 2025-08-23 DIAGNOSIS — M25.552 GREATER TROCHANTERIC PAIN SYNDROME OF BOTH LOWER EXTREMITIES: Primary | ICD-10-CM

## 2025-08-23 DIAGNOSIS — M25.551 GREATER TROCHANTERIC PAIN SYNDROME OF BOTH LOWER EXTREMITIES: Primary | ICD-10-CM

## 2025-08-23 PROCEDURE — 99214 OFFICE O/P EST MOD 30 MIN: CPT | Performed by: STUDENT IN AN ORGANIZED HEALTH CARE EDUCATION/TRAINING PROGRAM

## 2025-08-23 PROCEDURE — 73522 X-RAY EXAM HIPS BI 3-4 VIEWS: CPT | Mod: TC | Performed by: RADIOLOGY

## 2025-08-23 RX ORDER — PREDNISONE 20 MG/1
40 TABLET ORAL DAILY
Qty: 10 TABLET | Refills: 0 | Status: SHIPPED | OUTPATIENT
Start: 2025-08-23

## 2025-08-25 ENCOUNTER — TELEPHONE (OUTPATIENT)
Dept: FAMILY MEDICINE | Facility: CLINIC | Age: 65
End: 2025-08-25
Payer: COMMERCIAL

## 2025-08-25 ENCOUNTER — ANTICOAGULATION THERAPY VISIT (OUTPATIENT)
Dept: ANTICOAGULATION | Facility: CLINIC | Age: 65
End: 2025-08-25
Payer: COMMERCIAL

## 2025-08-25 DIAGNOSIS — Z86.711 HISTORY OF PULMONARY EMBOLISM: ICD-10-CM

## 2025-08-25 DIAGNOSIS — Z79.01 LONG TERM (CURRENT) USE OF ANTICOAGULANTS: ICD-10-CM

## 2025-08-25 DIAGNOSIS — I26.99 PULMONARY EMBOLISM WITH INFARCTION (H): Primary | ICD-10-CM

## 2025-08-25 LAB — INR (EXTERNAL): 3.9 (ref 0.9–1.1)

## 2025-08-26 ENCOUNTER — OFFICE VISIT (OUTPATIENT)
Dept: PHYSICAL MEDICINE AND REHAB | Facility: CLINIC | Age: 65
End: 2025-08-26
Attending: FAMILY MEDICINE
Payer: COMMERCIAL

## 2025-08-26 VITALS
HEART RATE: 82 BPM | HEIGHT: 66 IN | DIASTOLIC BLOOD PRESSURE: 70 MMHG | SYSTOLIC BLOOD PRESSURE: 147 MMHG | BODY MASS INDEX: 47.09 KG/M2 | WEIGHT: 293 LBS

## 2025-08-26 DIAGNOSIS — M53.3 SACROILIAC JOINT PAIN: ICD-10-CM

## 2025-08-26 DIAGNOSIS — M47.816 LUMBAR FACET ARTHROPATHY: Primary | ICD-10-CM

## 2025-08-26 DIAGNOSIS — M81.0 AGE-RELATED OSTEOPOROSIS WITHOUT CURRENT PATHOLOGICAL FRACTURE: ICD-10-CM

## 2025-08-26 DIAGNOSIS — M54.16 LUMBAR RADICULOPATHY: ICD-10-CM

## 2025-08-26 PROCEDURE — 3078F DIAST BP <80 MM HG: CPT | Performed by: PHYSICIAN ASSISTANT

## 2025-08-26 PROCEDURE — 99214 OFFICE O/P EST MOD 30 MIN: CPT | Performed by: PHYSICIAN ASSISTANT

## 2025-08-26 PROCEDURE — 1125F AMNT PAIN NOTED PAIN PRSNT: CPT | Performed by: PHYSICIAN ASSISTANT

## 2025-08-26 PROCEDURE — 3077F SYST BP >= 140 MM HG: CPT | Performed by: PHYSICIAN ASSISTANT

## 2025-08-26 ASSESSMENT — PAIN SCALES - GENERAL: PAINLEVEL_OUTOF10: MODERATE PAIN (6)

## 2025-08-27 DIAGNOSIS — E78.2 MIXED HYPERLIPIDEMIA: ICD-10-CM

## 2025-08-27 DIAGNOSIS — G47.00 INSOMNIA, UNSPECIFIED TYPE: ICD-10-CM

## 2025-08-27 DIAGNOSIS — Z86.711 HISTORY OF PULMONARY EMBOLISM: ICD-10-CM

## 2025-08-28 RX ORDER — WARFARIN SODIUM 5 MG/1
TABLET ORAL
Qty: 64 TABLET | Refills: 11 | Status: SHIPPED | OUTPATIENT
Start: 2025-08-28

## 2025-08-28 RX ORDER — TRAZODONE HYDROCHLORIDE 50 MG/1
TABLET ORAL
Qty: 28 TABLET | Refills: 11 | Status: SHIPPED | OUTPATIENT
Start: 2025-08-28

## 2025-08-28 RX ORDER — ROSUVASTATIN CALCIUM 20 MG/1
20 TABLET, COATED ORAL DAILY
Qty: 28 TABLET | Refills: 0 | Status: SHIPPED | OUTPATIENT
Start: 2025-08-28

## 2025-09-01 LAB — INR (EXTERNAL): 4

## 2025-09-02 ENCOUNTER — TELEPHONE (OUTPATIENT)
Dept: FAMILY MEDICINE | Facility: CLINIC | Age: 65
End: 2025-09-02
Payer: COMMERCIAL

## 2025-09-02 ENCOUNTER — ANTICOAGULATION THERAPY VISIT (OUTPATIENT)
Dept: ANTICOAGULATION | Facility: CLINIC | Age: 65
End: 2025-09-02
Payer: COMMERCIAL

## 2025-09-02 DIAGNOSIS — Z86.711 HISTORY OF PULMONARY EMBOLISM: ICD-10-CM

## 2025-09-02 DIAGNOSIS — I26.99 PULMONARY EMBOLISM WITH INFARCTION (H): Primary | ICD-10-CM

## 2025-09-02 DIAGNOSIS — Z79.01 LONG TERM (CURRENT) USE OF ANTICOAGULANTS: ICD-10-CM

## (undated) DEVICE — TUBING SUCTION MEDI-VAC 1/4"X20' N620A

## (undated) DEVICE — SOL WATER IRRIG 1000ML BOTTLE 2F7114

## (undated) DEVICE — FORCEP BIOPSY 2.3MM DISP COATED 000388

## (undated) DEVICE — SUCTION MANIFOLD NEPTUNE 2 SYS 1 PORT 702-025-000

## (undated) DEVICE — Device

## (undated) DEVICE — ENDO SNARE EXACTO COLD 9MM LOOP 2.4MMX230CM 00711115